# Patient Record
Sex: FEMALE | Race: BLACK OR AFRICAN AMERICAN | Employment: OTHER | URBAN - METROPOLITAN AREA
[De-identification: names, ages, dates, MRNs, and addresses within clinical notes are randomized per-mention and may not be internally consistent; named-entity substitution may affect disease eponyms.]

---

## 2017-01-10 RX ORDER — SYRINGE-NEEDLE,INSULIN,0.5 ML 30 G X1/2"
SYRINGE, EMPTY DISPOSABLE MISCELLANEOUS
Qty: 100 SYRINGE | Refills: 11 | Status: SHIPPED | OUTPATIENT
Start: 2017-01-10 | End: 2017-07-30

## 2017-02-02 ENCOUNTER — OFFICE VISIT (OUTPATIENT)
Dept: INTERNAL MEDICINE CLINIC | Age: 77
End: 2017-02-02

## 2017-02-02 DIAGNOSIS — E11.9 TYPE 2 DIABETES MELLITUS WITHOUT COMPLICATION, WITH LONG-TERM CURRENT USE OF INSULIN (HCC): Primary | ICD-10-CM

## 2017-02-02 DIAGNOSIS — Z79.4 TYPE 2 DIABETES MELLITUS WITHOUT COMPLICATION, WITH LONG-TERM CURRENT USE OF INSULIN (HCC): Primary | ICD-10-CM

## 2017-02-02 DIAGNOSIS — E03.2 HYPOTHYROIDISM DUE TO MEDICATION: ICD-10-CM

## 2017-02-02 DIAGNOSIS — I10 ESSENTIAL HYPERTENSION: ICD-10-CM

## 2017-02-02 DIAGNOSIS — E78.5 DYSLIPIDEMIA: ICD-10-CM

## 2017-02-02 NOTE — MR AVS SNAPSHOT
Visit Information Date & Time Provider Department Dept. Phone Encounter #  
 2/2/2017  2:45 PM Jameson Delatorre MD [facility] [employer] [facility] [address] [id number] Upcoming Health Maintenance Date Due  
 FOOT EXAM Q1 5/8/2016 HEMOGLOBIN A1C Q6M 9/22/2016 EYE EXAM RETINAL OR DILATED Q1 3/31/2017* GLAUCOMA SCREENING Q2Y 5/8/2017 MICROALBUMIN Q1 7/14/2017 LIPID PANEL Q1 7/14/2017 MEDICARE YEARLY EXAM 7/15/2017 Pneumococcal 65+ Low/Medium Risk (2 of 2 - PPSV23) 11/11/2017 DTaP/Tdap/Td series (2 - Td) 2/2/2027 *Topic was postponed. The date shown is not the original due date. Allergies as of 2/2/2017  Review Complete On: 2/2/2017 By: Audelia Iyer No Known Allergies Current Immunizations  Reviewed on 11/15/2012 Name Date Influenza Vaccine Split  Deferred (Patient Refused) Pneumococcal Vaccine (Unspecified Type) 11/11/2012 12:57 PM  
  
 Not reviewed this visit You Were Diagnosed With   
  
 Codes Comments Type 2 diabetes mellitus without complication, with long-term current use of insulin (HCC)    -  Primary ICD-10-CM: E11.9, Z79.4 ICD-9-CM: 250.00, V58.67 Dyslipidemia     ICD-10-CM: E78.5 ICD-9-CM: 272.4 Essential hypertension     ICD-10-CM: I10 
ICD-9-CM: 401.9 Hypothyroidism due to medication     ICD-10-CM: E03.2 ICD-9-CM: 244.8, E980.5 Vitals BP Pulse Temp Height(growth percentile) Weight(growth percentile) SpO2  
 149/69 (BP 1 Location: Left arm, BP Patient Position: Sitting) 72 98 °F (36.7 °C) (Oral) 5' 3\" (1.6 m) 177 lb 4.8 oz (80.4 kg) 98% BMI OB Status Smoking Status 31.41 kg/m2 Hysterectomy Never Smoker BMI and BSA Data Body Mass Index Body Surface Area  
 31.41 kg/m 2 1.89 m 2 Preferred Pharmacy Pharmacy Name Phone [id number] Lizette Stanley, [address] [place] [address] [phone] Your Updated Medication List  
  
   
 This list is accurate as of: 2/2/17  4:25 PM.  Always use your most recent med list. amLODIPine 10 mg tablet Commonly known as:  Blanka Croon TAKE 1 TABLET BY MOUTH EVERY MORNING  
  
 brimonidine 0.15 % ophthalmic solution Commonly known as:  ALPHAGAN  
  
 clopidogrel 75 mg Tab Commonly known as:  PLAVIX TAKE 1 TABLET BY MOUTH EVERY DAY  
  
 fenofibrate nanocrystallized 145 mg tablet Commonly known as:  TRICOR  
TAKE 1 TABLET BY MOUTH EVERY DAY  
  
 insulin aspart protamine/insulin aspart 100 unit/mL (70-30) injection Commonly known as:  NovoLOG Mix 70-30  
30 units ac b Insulin Syringe-Needle U-100 1/2 mL 30 gauge x 1/2\" Syrg Commonly known as:  BD INSULIN SYRINGE ULTRA-FINE Use as directed daily. E11.9  
  
 latanoprost 0.005 % ophthalmic solution Commonly known as:  Stanislav Cameron Administer 1 Drop to both eyes nightly. levothyroxine 150 mcg tablet Commonly known as:  SYNTHROID  
TAKE 1 TABLET BY MOUTH EVERY DAY  
  
 losartan-hydroCHLOROthiazide 100-25 mg per tablet Commonly known as:  HYZAAR  
TAKE 1 TABLET ONCE A DAY ORALLY Introducing [facility]! Marly Tabares introduces [employer] patient portal. Now you can access parts of your medical record, email your doctor's office, and request medication refills online. 1. In your internet browser, go to [email or web address] 2. Click on the First Time User? Click Here link in the Sign In box. You will see the New Member Sign Up page. 3. Enter your [employer] Access Code exactly as it appears below. You will not need to use this code after youve completed the sign-up process. If you do not sign up before the expiration date, you must request a new code. · [employer] Access Code: [email or web address] Expires: 5/3/2017  4:25 PM 
 
4. Enter the last four digits of your Social Security Number (xxxx) and Date of Birth (mm/dd/yyyy) as indicated and click Submit.  You will be taken to the next sign-up page. 5. Create a [employer] ID. This will be your [employer] login ID and cannot be changed, so think of one that is secure and easy to remember. 6. Create a [employer] password. You can change your password at any time. 7. Enter your Password Reset Question and Answer. This can be used at a later time if you forget your password. 8. Enter your e-mail address. You will receive e-mail notification when new information is available in [address] 9. Click Sign Up. You can now view and download portions of your medical record. 10. Click the Download Summary menu link to download a portable copy of your medical information. If you have questions, please visit the Frequently Asked Questions section of the [employer] website. Remember, [employer] is NOT to be used for urgent needs. For medical emergencies, dial 911. Now available from your iPhone and Android! Please provide this summary of care documentation to your next provider. Your primary care clinician is listed as William Doll. If you have any questions after today's visit, please call [phone].

## 2017-02-02 NOTE — PROGRESS NOTES
[address] [facility]  [facility]  [address] [place] [address]  Phone:  [phone]  Fax: [phone]       Chief Complaint   Patient presents with    Diabetes     6 week follow up    . SUBJECTIVE:    Philippe Lyons is a 77 y.o. female comes in for return visit stating that she has done fairly well. She has not had any symptomatic hypoglycemia. She is currently taking premixed insulin 30 units AC breakfast and occasionally 4 units AC dinner. Her control is not optimal.  I have discussed with her different treatment regimens which would minimize hypoglycemia and allow more intense diabetic control. She has been off this on several occasions but continues to decline. She has been taking her antihypertensive medication as prescribed as is the case with her statin in view of her increased cardiovascular risk. To date she has not had any cardiovascular event. She remains on her thyroid supplement also. She continues to lead a sedentary existence. Current Outpatient Prescriptions   Medication Sig Dispense Refill    Insulin Syringe-Needle U-100 (BD INSULIN SYRINGE ULTRA-FINE) 1/2 mL 30 gauge x 1/2\" syrg Use as directed daily. E11.9 100 Syringe 11    fenofibrate nanocrystallized (TRICOR) 145 mg tablet TAKE 1 TABLET BY MOUTH EVERY DAY 30 Tab 11    losartan-hydrochlorothiazide (HYZAAR) 100-25 mg per tablet TAKE 1 TABLET ONCE A DAY ORALLY 30 Tab 11    clopidogrel (PLAVIX) 75 mg tablet TAKE 1 TABLET BY MOUTH EVERY DAY 30 Tab 11    levothyroxine (SYNTHROID) 150 mcg tablet TAKE 1 TABLET BY MOUTH EVERY DAY 30 Tab 11    amLODIPine (NORVASC) 10 mg tablet TAKE 1 TABLET BY MOUTH EVERY MORNING 30 Tab 11    insulin aspart protamine/insulin aspart (NOVOLOG MIX 70-30) 100 unit/mL (70-30) injection 30 units ac b 20 mL 9    brimonidine (ALPHAGAN) 0.15 % ophthalmic solution       latanoprost (XALATAN) 0.005 % ophthalmic solution Administer 1 Drop to both eyes nightly.          Past Medical History   Diagnosis Date    [facility]     [facility]      unknown to family    Hypercholesteremia     Hypertension     Stroke ([facility]     Thyroid disease      Past Surgical History   Procedure Laterality Date    Hx gyn      Hx hysterectomy      Pr thyroidectomy      Pr removal gallbladder      Hx heent       thyroidectomy     No Known Allergies      REVIEW OF SYSTEMS:  General: negative for - chills or fever  ENT: negative for - headaches, nasal congestion or tinnitus  Respiratory: negative for - cough, hemoptysis, shortness of breath or wheezing  Cardiovascular : negative for - chest pain, edema, palpitations or shortness of breath  Gastrointestinal: negative for - abdominal pain, blood in stools, heartburn or nausea/vomiting  Genito-Urinary: no dysuria, trouble voiding, or hematuria  Musculoskeletal: negative for - gait disturbance, joint pain, joint stiffness or joint swelling  Neurological: no TIA or stroke symptoms  Hematologic: no bruises, no bleeding, no swollen glands  Integument: no lumps, mole changes, nail changes or rash  Endocrine: no malaise/lethargy or unexpected weight changes      Social History     Social History    Marital status:      Spouse name: N/A    Number of children: 1    Years of education: N/A     Occupational History    retired      Social History Main Topics    Smoking status: Never Smoker    Smokeless tobacco: Never Used    Alcohol use Yes      Comment: been years    Drug use: No    Sexual activity: Not Currently     Other Topics Concern    None     Social History Narrative     Family History   Problem Relation Age of Onset    Diabetes Father     No Known Problems Mother        OBJECTIVE:    Visit Vitals    /69 (BP 1 Location: Left arm, BP Patient Position: Sitting)  Comment: repeat 130/60    Pulse 72    Temp 98 °F (36.7 °C) (Oral)    Ht 5' 3\" (1.6 m)    Wt 177 lb 4.8 oz (80.4 kg)    SpO2 98%    BMI 31.41 kg/m2     CONSTITUTIONAL: well , well nourished, appears age appropriate  EYES: perrla, eom intact  ENMT:moist mucous membranes, pharynx clear  NECK: supple. Thyroid normal  RESPIRATORY: Chest: clear to ascultation and percussion   CARDIOVASCULAR: Heart: regular rate and rhythm  GASTROINTESTINAL: Abdomen: soft, bowel sounds active  HEMATOLOGIC: no pathological lymph nodes palpated  MUSCULOSKELETAL: Extremities: no edema, pulse 1+   INTEGUMENT: No unusual rashes or suspicious skin lesions noted. Nails appear normal.  NEUROLOGIC: non-focal exam   MENTAL STATUS: alert and oriented, appropriate affect      ASSESSMENT:  1. Type 2 diabetes mellitus without complication, with long-term current use of insulin ([address]    2. Dyslipidemia    3. Essential hypertension    4. Hypothyroidism due to medication        PLAN:    1. I again discuss the need to change her diabetic regimen and she refuses. She is actually on a regimen that has a high potential to develop hypoglycemia. Given the fact that she wants to remain on this I strongly suggest to her as I have done repetitively that she has to eat at the same time every day. The majority if not all of her reactions have occurred when she either missed a meal or did not eat on time. The current regimen is very unforgiving as I have told her many times before. 2. She will continue her statin as prescribed and efficacy and compliance will be assessed. 3. Her blood pressure is excellent today and no adjustments are made. 4. She will continue her thyroid supplement. Her last TSH was entirely normal.    5. I strongly advise her to increase her physical activity. She leads an extremely sedentary lifestyle which is not optimal for her diabetes. .  Orders Placed This Encounter    HEMOGLOBIN A1C WITH EAG    METABOLIC PANEL, BASIC    APOLIPOPROTEIN B         Follow-up Disposition:  Return in about 3 months (around 5/2/2017).       Karishma Hagen MD

## 2017-02-02 NOTE — PROGRESS NOTES
Chief Complaint   Patient presents with    Diabetes     6 week follow up      1. Have you been to the ER, urgent care clinic since your last visit? Hospitalized since your last visit? No    2. Have you seen or consulted any other health care providers outside of the [address] [place] [address] since your last visit? Include any pap smears or colon screening.  No

## 2017-02-05 VITALS
OXYGEN SATURATION: 98 % | BODY MASS INDEX: 31.41 KG/M2 | DIASTOLIC BLOOD PRESSURE: 69 MMHG | WEIGHT: 177.3 LBS | TEMPERATURE: 98 F | HEIGHT: 63 IN | HEART RATE: 72 BPM | SYSTOLIC BLOOD PRESSURE: 149 MMHG

## 2017-02-06 LAB
APO B SERPL-MCNC: 69 MG/DL (ref 54–133)
BUN SERPL-MCNC: 20 MG/DL (ref 8–27)
BUN/CREAT SERPL: 23 (ref 11–26)
CALCIUM SERPL-MCNC: 9.4 MG/DL (ref 8.7–10.3)
CHLORIDE SERPL-SCNC: 99 MMOL/L (ref 96–106)
CO2 SERPL-SCNC: 23 MMOL/L (ref 18–29)
CREAT SERPL-MCNC: 0.87 MG/DL (ref 0.57–1)
EST. AVERAGE GLUCOSE BLD GHB EST-MCNC: 203 MG/DL
GLUCOSE SERPL-MCNC: 44 MG/DL (ref 65–99)
HBA1C MFR BLD: 8.7 % (ref 4.8–5.6)
POTASSIUM SERPL-SCNC: 4.2 MMOL/L (ref 3.5–5.2)
SODIUM SERPL-SCNC: 141 MMOL/L (ref 134–144)

## 2017-02-20 ENCOUNTER — HOSPITAL ENCOUNTER (EMERGENCY)
Age: 77
Discharge: HOME | End: 2017-02-20
Attending: EMERGENCY MEDICINE
Payer: MEDICARE

## 2017-02-20 VITALS
BODY MASS INDEX: 30.12 KG/M2 | TEMPERATURE: 97.8 F | WEIGHT: 170 LBS | RESPIRATION RATE: 17 BRPM | HEIGHT: 63 IN | SYSTOLIC BLOOD PRESSURE: 154 MMHG | HEART RATE: 83 BPM | DIASTOLIC BLOOD PRESSURE: 63 MMHG | OXYGEN SATURATION: 97 %

## 2017-02-20 DIAGNOSIS — E16.2 HYPOGLYCEMIA: Primary | ICD-10-CM

## 2017-02-20 LAB
ALBUMIN SERPL BCP-MCNC: 3.6 G/DL (ref 3.5–5)
ALBUMIN/GLOB SERPL: 1.2 {RATIO} (ref 1.1–2.2)
ALP SERPL-CCNC: 62 U/L (ref 45–117)
ALT SERPL-CCNC: 25 U/L (ref 12–78)
ANION GAP BLD CALC-SCNC: 13 MMOL/L (ref 5–15)
AST SERPL W P-5'-P-CCNC: 16 U/L (ref 15–37)
BILIRUB SERPL-MCNC: 0.5 MG/DL (ref 0.2–1)
BUN SERPL-MCNC: 24 MG/DL (ref 6–20)
BUN/CREAT SERPL: 30 (ref 12–20)
CALCIUM SERPL-MCNC: 8.5 MG/DL (ref 8.5–10.1)
CHLORIDE SERPL-SCNC: 98 MMOL/L (ref 97–108)
CO2 SERPL-SCNC: 27 MMOL/L (ref 21–32)
CREAT SERPL-MCNC: 0.81 MG/DL (ref 0.55–1.02)
GLOBULIN SER CALC-MCNC: 3.1 G/DL (ref 2–4)
GLUCOSE BLD STRIP.AUTO-MCNC: 169 MG/DL (ref 65–100)
GLUCOSE BLD STRIP.AUTO-MCNC: 180 MG/DL (ref 65–100)
GLUCOSE BLD STRIP.AUTO-MCNC: 222 MG/DL (ref 65–100)
GLUCOSE BLD STRIP.AUTO-MCNC: 451 MG/DL (ref 65–100)
GLUCOSE SERPL-MCNC: 172 MG/DL (ref 65–100)
POTASSIUM SERPL-SCNC: 3.3 MMOL/L (ref 3.5–5.1)
PROT SERPL-MCNC: 6.7 G/DL (ref 6.4–8.2)
SERVICE CMNT-IMP: ABNORMAL
SODIUM SERPL-SCNC: 138 MMOL/L (ref 136–145)

## 2017-02-20 PROCEDURE — 82962 GLUCOSE BLOOD TEST: CPT

## 2017-02-20 PROCEDURE — 99284 EMERGENCY DEPT VISIT MOD MDM: CPT

## 2017-02-20 PROCEDURE — 80053 COMPREHEN METABOLIC PANEL: CPT | Performed by: STUDENT IN AN ORGANIZED HEALTH CARE EDUCATION/TRAINING PROGRAM

## 2017-02-20 PROCEDURE — 36415 COLL VENOUS BLD VENIPUNCTURE: CPT | Performed by: STUDENT IN AN ORGANIZED HEALTH CARE EDUCATION/TRAINING PROGRAM

## 2017-02-20 PROCEDURE — 74011636637 HC RX REV CODE- 636/637: Performed by: STUDENT IN AN ORGANIZED HEALTH CARE EDUCATION/TRAINING PROGRAM

## 2017-02-20 RX ORDER — INSULIN ASPART 100 [IU]/ML
15 INJECTION, SUSPENSION SUBCUTANEOUS
Qty: 10 ML | Refills: 0 | Status: ON HOLD | OUTPATIENT
Start: 2017-02-20 | End: 2017-06-06

## 2017-02-20 RX ORDER — INSULIN ASPART 100 [IU]/ML
INJECTION, SUSPENSION SUBCUTANEOUS
Qty: 20 ML | Refills: 9 | Status: SHIPPED | OUTPATIENT
Start: 2017-02-20 | End: 2017-02-20

## 2017-02-20 RX ADMIN — INSULIN LISPRO 15 UNITS: 100 INJECTION, SUSPENSION SUBCUTANEOUS at 09:02

## 2017-02-20 NOTE — ED NOTES
Discharge instructions reviewed with patient, copy given by Dr. Britany Hood . Pt escorted to exit area via wheelchair.

## 2017-02-20 NOTE — ED PROVIDER NOTES
Patient is a 77 y.o. female presenting with hypoglycemia. The history is provided by the patient. Low Blood Sugar    This is a new problem. The current episode started 1 to 2 hours ago. The problem has not changed since onset. Associated symptoms include confusion. Mental status baseline is normal.  Her past medical history is significant for diabetes. 75y/o F with hx of DM presents with confusion and hypoglycemia. Pt's son found her this am confused and checked her BG with was 39. She was given 4 tabs of oral glucose by EMS which kyle her sugar to 64. On arrival, patient was at baseline mental status. She reports taking her insulin 70/30 at 9am yesterday. Patient has not been eating full meals. Her pcp has been trying to change her insulin regimen, but patient keeps refusing. Denies cp, sob, N/V, abd pain. Below was taken from the most recent pcp visit:  \"She is currently taking premixed insulin 30 units AC breakfast and occasionally 4 units AC dinner. Her control is not optimal. I have discussed with her different treatment regimens which would minimize hypoglycemia and allow more intense diabetic control. She has been off this on several occasions but continues to decline.  \"    Past Medical History:   Diagnosis Date    Diabetes ([address]     Heart failure ([address]      unknown to family    Hypercholesteremia     Hypertension     Stroke ([address]     Thyroid disease        Past Surgical History:   Procedure Laterality Date    Hx gyn      Hx hysterectomy      Pr thyroidectomy      Pr removal gallbladder      Hx heent       thyroidectomy         Family History:   Problem Relation Age of Onset    Diabetes Father     No Known Problems Mother        Social History     Social History    Marital status:      Spouse name: N/A    Number of children: 1    Years of education: N/A     Occupational History    retired      Social History Main Topics    Smoking status: Never Smoker    Smokeless tobacco: Never Used    Alcohol use Yes      Comment: been years    Drug use: No    Sexual activity: Not Currently     Other Topics Concern    Not on file     Social History Narrative         ALLERGIES: Review of patient's allergies indicates no known allergies. Review of Systems   Constitutional: Negative for activity change, chills and fever. HENT: Negative for congestion and sore throat. Eyes: Negative for pain and redness. Respiratory: Negative for cough, chest tightness and shortness of breath. Cardiovascular: Negative for chest pain and palpitations. Gastrointestinal: Negative for abdominal pain, diarrhea, nausea and vomiting. Genitourinary: Negative for dysuria, frequency and urgency. Musculoskeletal: Negative for back pain and neck pain. Skin: Negative for rash. Neurological: Negative for syncope, light-headedness and headaches. Psychiatric/Behavioral: Positive for confusion. All other systems reviewed and are negative. Vitals:    02/20/17 0709   BP: 175/72   Pulse: 83   Resp: 17   Temp: 97.8 °F (36.6 °C)   SpO2: 99%   Weight: 77.1 kg (170 lb)   Height: 5' 3\" (1.6 m)            Physical Exam   Constitutional: She is oriented to person, place, and time. She appears well-developed and well-nourished. No distress. HENT:   Head: Normocephalic. Nose: Nose normal.   Mouth/Throat: Oropharynx is clear and moist. No oropharyngeal exudate. Eyes: Conjunctivae are normal. Pupils are equal, round, and reactive to light. No scleral icterus. Neck: Normal range of motion. Neck supple. No JVD present. No tracheal deviation present. No thyromegaly present. Cardiovascular: Normal rate, regular rhythm and intact distal pulses. Exam reveals no gallop and no friction rub. No murmur heard. Pulmonary/Chest: Effort normal and breath sounds normal. No stridor. No respiratory distress. She has no wheezes. She has no rales. Abdominal: Soft.  Bowel sounds are normal. She exhibits no distension. There is no tenderness. There is no rebound and no guarding. Musculoskeletal: Normal range of motion. She exhibits no edema. Lymphadenopathy:     She has no cervical adenopathy. Neurological: She is alert and oriented to person, place, and time. No cranial nerve deficit. She exhibits normal muscle tone. Coordination normal.   Skin: Skin is warm and dry. No rash noted. She is not diaphoretic. No erythema. Psychiatric: She has a normal mood and affect. Her behavior is normal.   Nursing note and vitals reviewed. MDM  Number of Diagnoses or Management Options  Diagnosis management comments: Patient presents with hypoglycemia after taking her long acting insulin yesterday with subsequent poor PO intake. BG has now improved to 180 s/p 4 tabs of glucose. Pt is now at baseline mental status. Ddx: Hypoglycemia from insulin use in setting of poor po intake    --Will check cmp and rpt poc glucose in 1hr. Amount and/or Complexity of Data Reviewed  Clinical lab tests: ordered and reviewed  Tests in the radiology section of CPT®: reviewed  Tests in the medicine section of CPT®: ordered and reviewed      ED Course   Re-evaluation: 0840  BG now elevated to 455 without intervention. Will treat with half dose of home insulin (15U) and discharge home. Pt advised to eat full meals with snacks in between. She was also advised to cut her home insulin dose in half to 15U. Patient advised to followup with her pcp within 1-2 days.     Procedures    LABORATORY TESTS:  Recent Results (from the past 12 hour(s))   GLUCOSE, POC    Collection Time: 02/20/17  7:04 AM   Result Value Ref Range    Glucose (POC) 180 (H) 65 - 100 mg/dL    Performed by Ravindra Cooper ([facility])    GLUCOSE, POC    Collection Time: 02/20/17  7:53 AM   Result Value Ref Range    Glucose (POC) 222 (H) 65 - 100 mg/dL    Performed by Mohsen Dodson, COMPREHENSIVE    Collection Time: 02/20/17  7:54 AM   Result Value Ref Range Sodium 138 136 - 145 mmol/L    Potassium 3.3 (L) 3.5 - 5.1 mmol/L    Chloride 98 97 - 108 mmol/L    CO2 27 21 - 32 mmol/L    Anion gap 13 5 - 15 mmol/L    Glucose 172 (H) 65 - 100 mg/dL    BUN 24 (H) 6 - 20 MG/DL    Creatinine 0.81 0.55 - 1.02 MG/DL    BUN/Creatinine ratio 30 (H) 12 - 20      GFR est AA >60 >60 ml/min/1.73m2    GFR est non-AA >60 >60 ml/min/1.73m2    Calcium 8.5 8.5 - 10.1 MG/DL    Bilirubin, total 0.5 0.2 - 1.0 MG/DL    ALT (SGPT) 25 12 - 78 U/L    AST (SGOT) 16 15 - 37 U/L    Alk. phosphatase 62 45 - 117 U/L    Protein, total 6.7 6.4 - 8.2 g/dL    Albumin 3.6 3.5 - 5.0 g/dL    Globulin 3.1 2.0 - 4.0 g/dL    A-G Ratio 1.2 1.1 - 2.2     GLUCOSE, POC    Collection Time: 02/20/17  8:25 AM   Result Value Ref Range    Glucose (POC) 451 (H) 65 - 100 mg/dL    Performed by Mercedes Amador, POC    Collection Time: 02/20/17  8:31 AM   Result Value Ref Range    Glucose (POC) 169 (H) 65 - 100 mg/dL    Performed by Martínez Titus         IMAGING RESULTS:  No orders to display       MEDICATIONS GIVEN:  Medications   insulin lispro protamine/insulin lispro (HUMALOG MIX 75/25) injection 15 Units (not administered)       IMPRESSION:  1. Hypoglycemia        PLAN:  1. Take half the dose of your home insulin. Followup with your primary care doctor. Current Discharge Medication List      CONTINUE these medications which have CHANGED    Details   !! insulin aspart protamine/insulin aspart (NOVOLOG MIX 70-30) 100 unit/mL (70-30) injection 30 units ac b  Qty: 20 mL, Refills: 9      !! insulin aspart protamine/insulin aspart (NOVOLOG MIX 70-30) 100 unit/mL (70-30) injection 15 Units by SubCUTAneous route Daily (before breakfast). Qty: 10 mL, Refills: 0       !! - Potential duplicate medications found. Please discuss with provider.       CONTINUE these medications which have NOT CHANGED    Details   Insulin Syringe-Needle U-100 (BD INSULIN SYRINGE ULTRA-FINE) 1/2 mL 30 gauge x 1/2\" syrg Use as directed daily.E11.9  Qty: 100 Syringe, Refills: 11      fenofibrate nanocrystallized (TRICOR) 145 mg tablet TAKE 1 TABLET BY MOUTH EVERY DAY  Qty: 30 Tab, Refills: 11      losartan-hydrochlorothiazide (HYZAAR) 100-25 mg per tablet TAKE 1 TABLET ONCE A DAY ORALLY  Qty: 30 Tab, Refills: 11      clopidogrel (PLAVIX) 75 mg tablet TAKE 1 TABLET BY MOUTH EVERY DAY  Qty: 30 Tab, Refills: 11      levothyroxine (SYNTHROID) 150 mcg tablet TAKE 1 TABLET BY MOUTH EVERY DAY  Qty: 30 Tab, Refills: 11      amLODIPine (NORVASC) 10 mg tablet TAKE 1 TABLET BY MOUTH EVERY MORNING  Qty: 30 Tab, Refills: 11      brimonidine (ALPHAGAN) 0.15 % ophthalmic solution       latanoprost (XALATAN) 0.005 % ophthalmic solution Administer 1 Drop to both eyes nightly. 2.   Follow-up Information     Follow up With Details Comments [address] [place] Schedule an appointment as soon as possible for a visit in 1 day for blood glucose monitoring [id number] [place], [place] [address]suze Leach [facility] [phone]          Return to ED if worse       DISCHARGE NOTE  8:46 AM  The patient has been re-evaluated and is ready for discharge. Reviewed available results with patient. Counseled patient on diagnosis and care plan. Patient has expressed understanding, and all questions have been answered. Patient agrees with plan and agrees to follow up as recommended, or return to the ED if their symptoms worsen. Discharge instructions have been provided and explained to the patient, along with reasons to return to the ED.

## 2017-02-20 NOTE — ED NOTES
Bedside and Verbal shift change report given to Christina Espana RN  (oncoming nurse) by Key Roberson RN (offgoing nurse). Report included the following information SBAR, Kardex, ED Summary, Intake/Output, MAR, Recent Results and Med Rec Status.

## 2017-02-20 NOTE — ED TRIAGE NOTES
Per ems; known pt found with bgl of 39. Gave 4 tubes of oral gulose. Repeat bgl 64. Pt denies all complaints.

## 2017-02-20 NOTE — DISCHARGE INSTRUCTIONS
Hypoglycemia: Care Instructions  Your Care Instructions  Hypoglycemia means that your blood sugar is low and your body is not getting enough fuel. Some people get low blood sugar from not eating often enough. Some medicines to treat diabetes can cause low blood sugar. People who have had surgery on their stomachs or intestines may get hypoglycemia. Problems with the pancreas, kidneys, or liver also can cause low blood sugar. A snack or drink with sugar in it will raise your blood sugar and should ease your symptoms right away. Your doctor may recommend that you change or stop your medicines until you can get your blood sugar levels under control. In the long run, you may need to change your diet and eating habits so that you get enough fuel for your body throughout the day. Follow-up care is a key part of your treatment and safety. Be sure to make and go to all appointments, and call your doctor if you are having problems. It's also a good idea to know your test results and keep a list of the medicines you take. How can you care for yourself at home? · Learn to recognize the early signs of low blood sugar. Signs include:  ¨ Nausea. ¨ Hunger. ¨ Feeling nervous, irritable, or shaky. ¨ Cold, clammy, wet skin. ¨ Sweating (when you are not exercising). ¨ A fast heartbeat. ¨ Numbness or tingling of the fingertips or lips. · If you feel an episode of low blood sugar coming on, drink fruit juice or sugared (not diet) soda, or eat sugar in the form of candy, cubes, or tablets. Hover 3D are another [employer] W[employer]. · Eat small, frequent meals so that you do not get too hungry between meals. · Balance extra exercise with eating more. · Keep a written record of your low blood sugar episodes, including when you last ate and what you ate, so that you can learn what causes your blood sugar to drop.   · Make sure your family, friends, and coworkers know the symptoms of low blood sugar and know what to do to get your sugar level up. · Wear medical alert jewelry that lists your condition. You can buy this at most drugstores. When should you call for help? Call 911 anytime you think you may need emergency care. For example, call if:  · You have a seizure. · You passed out (lost consciousness), or you suddenly become very sleepy or confused. (You may have very low blood sugar.)  Call your doctor now or seek immediate medical care if:  · You have symptoms of low blood sugar, such as:  ¨ Sweating. ¨ Feeling nervous, shaky, and weak. ¨ Extreme hunger and slight nausea. ¨ Dizziness and headache. ¨ Blurred vision. ¨ Confusion. Watch closely for changes in your health, and be sure to contact your doctor if:  · Your symptoms continue or return. Where can you learn more? Go to http://toi-sunil.info/. Enter C959 in the search box to learn more about \"Hypoglycemia: Care Instructions. \"  Current as of: May 23, 2016  Content Version: 11.1  © 0406-7085 [employer]. Care instructions adapted under license by [employer] (which disclaims liability or warranty for this information). If you have questions about a medical condition or this instruction, always ask your healthcare professional. [address] any warranty or liability for your use of this information.

## 2017-02-20 NOTE — ED NOTES
0800 pt   0820 pt states she is concerned that BS low again and would like us to recheck BS.   0825 Pt's 451, pt states she spilled something \"sticky\" on her earlier this morning. 0830 Pt's BS rechecked and is 169. Pt provided with meal tray.

## 2017-02-21 ENCOUNTER — OFFICE VISIT (OUTPATIENT)
Dept: INTERNAL MEDICINE CLINIC | Age: 77
End: 2017-02-21

## 2017-02-21 VITALS
HEIGHT: 63 IN | SYSTOLIC BLOOD PRESSURE: 150 MMHG | DIASTOLIC BLOOD PRESSURE: 66 MMHG | HEART RATE: 82 BPM | BODY MASS INDEX: 30.48 KG/M2 | RESPIRATION RATE: 14 BRPM | WEIGHT: 172 LBS | TEMPERATURE: 98.8 F | OXYGEN SATURATION: 98 %

## 2017-02-21 DIAGNOSIS — E11.9 TYPE 2 DIABETES MELLITUS WITHOUT COMPLICATION, WITH LONG-TERM CURRENT USE OF INSULIN (HCC): Primary | ICD-10-CM

## 2017-02-21 DIAGNOSIS — I10 ESSENTIAL HYPERTENSION: ICD-10-CM

## 2017-02-21 DIAGNOSIS — Z79.4 TYPE 2 DIABETES MELLITUS WITHOUT COMPLICATION, WITH LONG-TERM CURRENT USE OF INSULIN (HCC): Primary | ICD-10-CM

## 2017-02-21 NOTE — PROGRESS NOTES
Chief Complaint   Patient presents with    Follow-up     ER follow up   . SUBECTIVE:    Good Mobley is a 77 y.o. female comes in for return visit stating that she has not done well. She had interventional hypoglycemia. Interestingly she checked her blood sugar post dinner and it was 400. She then took 4 units of insulin and nothing else to eat or drink the remainder of the night. Early the following morning she developed symptomatic hypoglycemia. The patient has a past history of hypertension, dyslipidemia, and hypothyroidism. Current Outpatient Prescriptions   Medication Sig Dispense Refill    insulin aspart protamine/insulin aspart (NOVOLOG MIX 70-30) 100 unit/mL (70-30) injection 15 Units by SubCUTAneous route Daily (before breakfast). 10 mL 0    Insulin Syringe-Needle U-100 (BD INSULIN SYRINGE ULTRA-FINE) 1/2 mL 30 gauge x 1/2\" syrg Use as directed daily. E11.9 100 Syringe 11    fenofibrate nanocrystallized (TRICOR) 145 mg tablet TAKE 1 TABLET BY MOUTH EVERY DAY 30 Tab 11    losartan-hydrochlorothiazide (HYZAAR) 100-25 mg per tablet TAKE 1 TABLET ONCE A DAY ORALLY 30 Tab 11    clopidogrel (PLAVIX) 75 mg tablet TAKE 1 TABLET BY MOUTH EVERY DAY 30 Tab 11    levothyroxine (SYNTHROID) 150 mcg tablet TAKE 1 TABLET BY MOUTH EVERY DAY 30 Tab 11    amLODIPine (NORVASC) 10 mg tablet TAKE 1 TABLET BY MOUTH EVERY MORNING 30 Tab 11    brimonidine (ALPHAGAN) 0.15 % ophthalmic solution       latanoprost (XALATAN) 0.005 % ophthalmic solution Administer 1 Drop to both eyes nightly.          Past Medical History   Diagnosis Date    Diabetes ([address]     Heart failure ([address]      unknown to family    Hypercholesteremia     Hypertension     Stroke ([address]     Thyroid disease      Past Surgical History   Procedure Laterality Date    Hx gyn      Hx hysterectomy      Pr thyroidectomy      Pr removal gallbladder      Hx heent       thyroidectomy     No Known Allergies    REVIEW OF SYSTEMS:  Review of Systems - Negative except   ENT ROS: negative for - headaches, hearing change, nasal congestion, oral lesions, tinnitus, visual changes or   Respiratory ROS: no cough, shortness of breath, or wheezing  Cardiovascular ROS: no chest pain or dyspnea on exertion  Gastrointestinal ROS: no abdominal pain, change in bowel habits, or black or blood  Genito-Urinary ROS: no dysuria, trouble voiding, or hematuria  Musculoskeletal ROS: negative  Neurological ROS: no TIA or stroke symptoms      Social History     Social History    Marital status:      Spouse name: N/A    Number of children: 1    Years of education: N/A     Occupational History    retired      Social History Main Topics    Smoking status: Never Smoker    Smokeless tobacco: Never Used    Alcohol use Yes      Comment: been years    Drug use: No    Sexual activity: Not Currently     Other Topics Concern    None     Social History Narrative   r  Family History   Problem Relation Age of Onset    Diabetes Father     No Known Problems Mother        OBJECTIVE:  Visit Vitals    /66 (BP 1 Location: Right arm, BP Patient Position: Sitting)    Pulse 82    Temp 98.8 °F (37.1 °C) (Oral)    Resp 14    Ht 5' 3\" (1.6 m)    Wt 172 lb (78 kg)    SpO2 98%    BMI 30.47 kg/m2     ENT: perrla,  eom intact  NECK: supple. Thyroid normal, no JVD  CHEST: clear to ascultation and percussion   HEART: regular rate and rhythm  ABD: soft, bowel sounds active,   EXTREMITIES: no edema, pulse 1+  INTEGUMENT: clear      ASSESSMENT:  1. Type 2 diabetes mellitus without complication, with long-term current use of insulin ([address]    2. Essential hypertension        PLAN:    1. The patient is having frequent reactions related to her premixed insulin. This is a very potent combination as I have mentioned to her before.   She would fair better on a basal insulin with a GLP1 agonist.  This would minimize symptomatic hypoglycemia and probably lead to better control. Her hemoglobin A1C is progressively increasing. I strongly advise her to make the transition but she declines. If she feels that she needs to take additional insulin she is to take less than 4 units during each of the episodes. 2. Blood pressure is excellent today and no adjustments are made. Blood pressure is 140/70.    3. She will keep her old appointment. Follow-up Disposition:  Return keep old apt.       Tere Howell MD

## 2017-02-21 NOTE — PROGRESS NOTES
1. Have you been to the ER, urgent care clinic since your last visit? Hospitalized since your last visit? Yes Where: [facility]    2. Have you seen or consulted any other health care providers outside of the [address] [place] [address] since your last visit? Include any pap smears or colon screening.  Yes Reason for visit: Low blood sugar

## 2017-02-21 NOTE — MR AVS SNAPSHOT
Visit Information Date & Time Provider Department Dept. Phone Encounter #  
 2/21/2017  4:45 PM Arielle Leon MD [facility] [phone] [id number] Your Appointments 5/4/2017  2:45 PM  
Any with Arielle Leon MD  
[address] [place] [address] and Primary Care [facility]) Appt Note: 3 month f/u  
 [address] [place]  
  
   
 [phone] Upcoming Health Maintenance Date Due  
 FOOT EXAM Q1 5/8/2016 EYE EXAM RETINAL OR DILATED Q1 3/31/2017* GLAUCOMA SCREENING Q2Y 5/8/2017 MICROALBUMIN Q1 7/14/2017 LIPID PANEL Q1 7/14/2017 MEDICARE YEARLY EXAM 7/15/2017 HEMOGLOBIN A1C Q6M 8/3/2017 Pneumococcal 65+ Low/Medium Risk (2 of 2 - PPSV23) 11/11/2017 DTaP/Tdap/Td series (2 - Td) 2/2/2027 *Topic was postponed. The date shown is not the original due date. Allergies as of 2/21/2017  Review Complete On: 2/21/2017 By: [place] No Known Allergies Current Immunizations  Reviewed on 11/15/2012 Name Date Influenza Vaccine Split  Deferred (Patient Refused) Pneumococcal Vaccine (Unspecified Type) 11/11/2012 12:57 PM  
  
 Not reviewed this visit Vitals BP Pulse Temp Resp Height(growth percentile) Weight(growth percentile) 150/66 (BP 1 Location: Right arm, BP Patient Position: Sitting) 82 98.8 °F (37.1 °C) (Oral) 14 5' 3\" (1.6 m) 172 lb (78 kg) SpO2 BMI OB Status Smoking Status 98% 30.47 kg/m2 Hysterectomy Never Smoker BMI and BSA Data Body Mass Index Body Surface Area  
 30.47 kg/m 2 1.86 m 2 Preferred Pharmacy Pharmacy Name Phone [id number] [address] [phone] Your Updated Medication List  
  
   
This list is accurate as of: 2/21/17  5:28 PM.  Always use your most recent med list. amLODIPine 10 mg tablet Commonly known as:  Nish Presser TAKE 1 TABLET BY MOUTH EVERY MORNING  
  
 brimonidine 0.15 % ophthalmic solution Commonly known as:  ALPHAGAN  
  
 clopidogrel 75 mg Tab Commonly known as:  PLAVIX TAKE 1 TABLET BY MOUTH EVERY DAY  
  
 fenofibrate nanocrystallized 145 mg tablet Commonly known as:  TRICOR  
TAKE 1 TABLET BY MOUTH EVERY DAY  
  
 insulin aspart protamine/insulin aspart 100 unit/mL (70-30) injection Commonly known as:  NovoLOG Mix 70-30  
15 Units by SubCUTAneous route Daily (before breakfast). Insulin Syringe-Needle U-100 1/2 mL 30 gauge x 1/2\" Syrg Commonly known as:  BD INSULIN SYRINGE ULTRA-FINE Use as directed daily. E11.9  
  
 latanoprost 0.005 % ophthalmic solution Commonly known as:  Deyanne Pace Administer 1 Drop to both eyes nightly. levothyroxine 150 mcg tablet Commonly known as:  SYNTHROID  
TAKE 1 TABLET BY MOUTH EVERY DAY  
  
 losartan-hydroCHLOROthiazide 100-25 mg per tablet Commonly known as:  HYZAAR  
TAKE 1 TABLET ONCE A DAY ORALLY Introducing \[facility]\"" & HEALTH SERVICES! Finesse Deutsch introduces Userscout patient portal. Now you can access parts of your medical record, email your doctor's office, and request medication refills online. 1. In your internet browser, go to [email or web address] 2. Click on the First Time User? Click Here link in the Sign In box. You will see the New Member Sign Up page. 3. Enter your Userscout Access Code exactly as it appears below. You will not need to use this code after youve completed the sign-up process. If you do not sign up before the expiration date, you must request a new code. · Userscout Access Code: [email or web address] Expires: 5/3/2017  4:25 PM 
 
4. Enter the last four digits of your Social Security Number (xxxx) and Date of Birth (mm/dd/yyyy) as indicated and click Submit. You will be taken to the next sign-up page. 5. Create a Userscout ID.  This will be your Userscout login ID and cannot be changed, so think of one that is secure and easy to remember. 6. Create a Swivl password. You can change your password at any time. 7. Enter your Password Reset Question and Answer. This can be used at a later time if you forget your password. 8. Enter your e-mail address. You will receive e-mail notification when new information is available in [address] 9. Click Sign Up. You can now view and download portions of your medical record. 10. Click the Download Summary menu link to download a portable copy of your medical information. If you have questions, please visit the Frequently Asked Questions section of the Swivl website. Remember, Swivl is NOT to be used for urgent needs. For medical emergencies, dial 911. Now available from your iPhone and Android! Please provide this summary of care documentation to your next provider. Your primary care clinician is listed as William Doll. If you have any questions after today's visit, please call [phone].

## 2017-03-08 RX ORDER — AMLODIPINE BESYLATE 10 MG/1
TABLET ORAL
Qty: 30 TAB | Refills: 11 | Status: SHIPPED | OUTPATIENT
Start: 2017-03-08 | End: 2017-03-09 | Stop reason: SDUPTHER

## 2017-03-09 RX ORDER — AMLODIPINE BESYLATE 10 MG/1
TABLET ORAL
Qty: 30 TAB | Refills: 11 | Status: SHIPPED | OUTPATIENT
Start: 2017-03-09 | End: 2018-01-21 | Stop reason: CLARIF

## 2017-03-21 RX ORDER — CLOPIDOGREL BISULFATE 75 MG/1
75 TABLET ORAL DAILY
Qty: 30 TAB | Refills: 11 | Status: SHIPPED | OUTPATIENT
Start: 2017-03-21 | End: 2017-06-16 | Stop reason: SDUPTHER

## 2017-04-02 RX ORDER — LEVOTHYROXINE SODIUM 150 UG/1
TABLET ORAL
Qty: 30 TAB | Refills: 11 | Status: SHIPPED | OUTPATIENT
Start: 2017-04-02 | End: 2018-04-10 | Stop reason: SDUPTHER

## 2017-05-04 ENCOUNTER — OFFICE VISIT (OUTPATIENT)
Dept: INTERNAL MEDICINE CLINIC | Age: 77
End: 2017-05-04

## 2017-05-04 VITALS
HEART RATE: 68 BPM | DIASTOLIC BLOOD PRESSURE: 72 MMHG | TEMPERATURE: 98.2 F | OXYGEN SATURATION: 97 % | RESPIRATION RATE: 18 BRPM | SYSTOLIC BLOOD PRESSURE: 128 MMHG | BODY MASS INDEX: 30.44 KG/M2 | HEIGHT: 63 IN | WEIGHT: 171.8 LBS

## 2017-05-04 DIAGNOSIS — R41.3 MEMORY DEFICIT: ICD-10-CM

## 2017-05-04 DIAGNOSIS — I10 ESSENTIAL HYPERTENSION: ICD-10-CM

## 2017-05-04 DIAGNOSIS — E78.5 DYSLIPIDEMIA: ICD-10-CM

## 2017-05-04 DIAGNOSIS — E03.2 HYPOTHYROIDISM DUE TO MEDICATION: ICD-10-CM

## 2017-05-04 DIAGNOSIS — E11.9 TYPE 2 DIABETES MELLITUS WITHOUT COMPLICATION, WITH LONG-TERM CURRENT USE OF INSULIN (HCC): Primary | ICD-10-CM

## 2017-05-04 DIAGNOSIS — Z79.4 TYPE 2 DIABETES MELLITUS WITHOUT COMPLICATION, WITH LONG-TERM CURRENT USE OF INSULIN (HCC): Primary | ICD-10-CM

## 2017-05-04 NOTE — PROGRESS NOTES
Chief Complaint   Patient presents with    Diabetes     3 month follow up    1. Have you been to the ER, urgent care clinic since your last visit? Hospitalized since your last visit? No    2. Have you seen or consulted any other health care providers outside of the [facility] since your last visit? Include any pap smears or colon screening.  No

## 2017-05-04 NOTE — PROGRESS NOTES
[address] [place] [address] and Primary Care  [place] [address]  [address] [facility] [address]  Phone:  [phone]  Fax: [phone]       Chief Complaint   Patient presents with    Diabetes     3 month follow up    . SUBJECTIVE:    Candy Vazquez is a 77 y.o. female comes in for return visit stating that she has done fairly well. We talk at length about her diabetes. I emphasize the need for her to eat every four hours during waking hours and to minimize hypoglycemia. She also has to be very consistent in the volume of food that she eats. She reminds me that she feels her memory is not as good as it used to be. She is having some short term memory problems although she is able to take care of all of her needs including cooking and paying her bills. She additionally has a past history of dyslipidemia, primary hypertension, hypothyroidism. Current Outpatient Prescriptions   Medication Sig Dispense Refill    levothyroxine (SYNTHROID) 150 mcg tablet TAKE 1 TABLET BY MOUTH EVERY DAY 30 Tab 11    clopidogrel (PLAVIX) 75 mg tab Take 1 Tab by mouth daily. 30 Tab 11    amLODIPine (NORVASC) 10 mg tablet TAKE 1 TABLET BY MOUTH EVERY MORNING 30 Tab 11    insulin aspart protamine/insulin aspart (NOVOLOG MIX 70-30) 100 unit/mL (70-30) injection 15 Units by SubCUTAneous route Daily (before breakfast). 10 mL 0    Insulin Syringe-Needle U-100 (BD INSULIN SYRINGE ULTRA-FINE) 1/2 mL 30 gauge x 1/2\" syrg Use as directed daily. E11.9 100 Syringe 11    fenofibrate nanocrystallized (TRICOR) 145 mg tablet TAKE 1 TABLET BY MOUTH EVERY DAY 30 Tab 11    brimonidine (ALPHAGAN) 0.15 % ophthalmic solution       latanoprost (XALATAN) 0.005 % ophthalmic solution Administer 1 Drop to both eyes nightly.         losartan-hydrochlorothiazide (HYZAAR) 100-25 mg per tablet TAKE 1 TABLET ONCE A DAY ORALLY 30 Tab 11     Past Medical History:   Diagnosis Date    Diabetes ([address]     Heart failure ([address]     unknown to family    Hypercholesteremia     Hypertension     [facility]     Thyroid disease      Past Surgical History:   Procedure Laterality Date    HX GYN      HX HEENT      thyroidectomy    HX HYSTERECTOMY      REMOVAL GALLBLADDER      THYROIDECTOMY       No Known Allergies      REVIEW OF SYSTEMS:  General: negative for - chills or fever  ENT: negative for - headaches, nasal congestion or tinnitus  Respiratory: negative for - cough, hemoptysis, shortness of breath or wheezing  Cardiovascular : negative for - chest pain, edema, palpitations or shortness of breath  Gastrointestinal: negative for - abdominal pain, blood in stools, heartburn or nausea/vomiting  Genito-Urinary: no dysuria, trouble voiding, or hematuria  Musculoskeletal: negative for - gait disturbance, joint pain, joint stiffness or joint swelling  Neurological: no TIA or stroke symptoms  Hematologic: no bruises, no bleeding, no swollen glands  Integument: no lumps, mole changes, nail changes or rash  Endocrine: no malaise/lethargy or unexpected weight changes      Social History     Social History    Marital status:      Spouse name: N/A    Number of children: 1    Years of education: N/A     Occupational History    retired      Social History Main Topics    Smoking status: Never Smoker    Smokeless tobacco: Never Used    Alcohol use Yes      Comment: been years    Drug use: No    Sexual activity: Not Currently     Other Topics Concern    None     Social History Narrative     Family History   Problem Relation Age of Onset    Diabetes Father     No Known Problems Mother        OBJECTIVE:    Visit Vitals    /72 (BP 1 Location: Left arm, BP Patient Position: Sitting)    Pulse 68    Temp 98.2 °F (36.8 °C) (Oral)    Resp 18    Ht 5' 3\" (1.6 m)    Wt 171 lb 12.8 oz (77.9 kg)    SpO2 97%    BMI 30.43 kg/m2     CONSTITUTIONAL: well , well nourished, appears age appropriate  EYES: perrla, eom intact  ENMT:moist mucous membranes, pharynx clear  NECK: supple. Thyroid normal  RESPIRATORY: Chest: clear to ascultation and percussion   CARDIOVASCULAR: Heart: regular rate and rhythm  GASTROINTESTINAL: Abdomen: soft, bowel sounds active  HEMATOLOGIC: no pathological lymph nodes palpated  MUSCULOSKELETAL: Extremities: no edema, pulse 1+   INTEGUMENT: No unusual rashes or suspicious skin lesions noted. Nails appear normal.  NEUROLOGIC: non-focal exam   MENTAL STATUS: alert and oriented, appropriate affect      ASSESSMENT:  1. Type 2 diabetes mellitus without complication, with long-term current use of insulin ([address]    2. Dyslipidemia    3. Essential hypertension    4. Hypothyroidism due to medication    5. Memory deficit        PLAN:    1. The patient's diabetes hopefully is doing well. I will await the resutls of her hemoglobin A1C. I again remind the patient that the current insulin that she is taking is very potent and ideally is not optimal for her given her history of recurrent insulin reactions. There are other regimens that would be safer and would probably get her to goal o na consistent basis. She again declines this. 2. She will continue her statin as prescribed. 3. Blood pressure is excellent. No adjustments are made. 4. She is also taking a thyroid supplement and her TSH will be assessed to ensure efficacy. 5. She again comments about the defects of her short term memory. 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 There might be a defect but for now I will observe. .  Orders Placed This Encounter    APOLIPOPROTEIN B    HEMOGLOBIN A1C WITH EAG    METABOLIC PANEL, BASIC    TSH 3RD GENERATION    VITAMIN B12         Follow-up Disposition:  Return in about 3 months (around 8/4/2017).       Justine West MD

## 2017-05-04 NOTE — MR AVS SNAPSHOT
Visit Information Date & Time Provider Department Dept. Phone Encounter #  
 5/4/2017  2:45 PM Shaniqua Heck MD [facility] [phone] [id number] Upcoming Health Maintenance Date Due  
 FOOT EXAM Q1 5/8/2016 EYE EXAM RETINAL OR DILATED Q1 5/8/2016 GLAUCOMA SCREENING Q2Y 5/8/2017 MICROALBUMIN Q1 7/14/2017 LIPID PANEL Q1 7/14/2017 MEDICARE YEARLY EXAM 7/15/2017 INFLUENZA AGE 9 TO ADULT 8/1/2017 HEMOGLOBIN A1C Q6M 8/3/2017 Pneumococcal 65+ Low/Medium Risk (2 of 2 - PPSV23) 11/11/2017 DTaP/Tdap/Td series (2 - Td) 2/2/2027 Allergies as of 5/4/2017  Review Complete On: 5/4/2017 By: Ming Mccullough No Known Allergies Current Immunizations  Reviewed on 11/15/2012 Name Date Influenza Vaccine Split  Deferred (Patient Refused) Pneumococcal Vaccine (Unspecified Type) 11/11/2012 12:57 PM  
  
 Not reviewed this visit You Were Diagnosed With   
  
 Codes Comments Type 2 diabetes mellitus without complication, with long-term current use of insulin (HCC)    -  Primary ICD-10-CM: E11.9, Z79.4 ICD-9-CM: 250.00, V58.67 Dyslipidemia     ICD-10-CM: E78.5 ICD-9-CM: 272.4 Essential hypertension     ICD-10-CM: I10 
ICD-9-CM: 401.9 Hypothyroidism due to medication     ICD-10-CM: E03.2 ICD-9-CM: 244.8, E980.5 Memory deficit     ICD-10-CM: R41.3 ICD-9-CM: 780.93 Vitals BP Pulse Temp Resp Height(growth percentile) Weight(growth percentile) 128/72 (BP 1 Location: Left arm, BP Patient Position: Sitting) 68 98.2 °F (36.8 °C) (Oral) 18 5' 3\" (1.6 m) 171 lb 12.8 oz (77.9 kg) SpO2 BMI OB Status Smoking Status 97% 30.43 kg/m2 Hysterectomy Never Smoker BMI and BSA Data Body Mass Index Body Surface Area  
 30.43 kg/m 2 1.86 m 2 Preferred Pharmacy Pharmacy Name Phone [id number] Kesha Tracy, [address] [place] [address] [phone] Your Updated Medication List  
  
   
This list is accurate as of: 5/4/17  4:23 PM.  Always use your most recent med list. amLODIPine 10 mg tablet Commonly known as:  [employer] TAKE 1 TABLET BY MOUTH EVERY MORNING  
  
 brimonidine 0.15 % ophthalmic solution Commonly known as:  ALPHAGAN  
  
 clopidogrel 75 mg Tab Commonly known as:  PLAVIX Take 1 Tab by mouth daily. fenofibrate nanocrystallized 145 mg tablet Commonly known as:  TRICOR  
TAKE 1 TABLET BY MOUTH EVERY DAY  
  
 insulin aspart protamine/insulin aspart 100 unit/mL (70-30) injection Commonly known as:  NovoLOG Mix 70-30  
15 Units by SubCUTAneous route Daily (before breakfast). Insulin Syringe-Needle U-100 1/2 mL 30 gauge x 1/2\" Syrg Commonly known as:  BD INSULIN SYRINGE ULTRA-FINE Use as directed daily. E11.9  
  
 latanoprost 0.005 % ophthalmic solution Commonly known as:  Andrés Spann Administer 1 Drop to both eyes nightly. levothyroxine 150 mcg tablet Commonly known as:  SYNTHROID  
TAKE 1 TABLET BY MOUTH EVERY DAY  
  
 losartan-hydroCHLOROthiazide 100-25 mg per tablet Commonly known as:  HYZAAR  
TAKE 1 TABLET ONCE A DAY ORALLY We Performed the Following APOLIPOPROTEIN B [id number] CPT(R)] HEMOGLOBIN A1C WITH EAG [02469 CPT(R)] METABOLIC PANEL, BASIC [14175 CPT(R)] TSH 3RD GENERATION [12066 CPT(R)] VITAMIN B12 [id number] CPT(R)] Introducing [facility]! Shaheed White introduces [employer] patient portal. Now you can access parts of your medical record, email your doctor's office, and request medication refills online. 1. In your internet browser, go to [email or web address] (acquired by [employer] in 2014). [employer] (acquired by [employer] in 2014) 2. Click on the First Time User? Click Here link in the Sign In box. You will see the New Member Sign Up page. 3. Enter your [employer] Access Code exactly as it appears below. You will not need to use this code after youve completed the sign-up process.  If you do not sign up before the expiration date, you must request a new code. · [employer] Access Code: [email or web address] Expires: 8/2/2017  4:23 PM 
 
4. Enter the last four digits of your Social Security Number (xxxx) and Date of Birth (mm/dd/yyyy) as indicated and click Submit. You will be taken to the next sign-up page. 5. Create a [employer] ID. This will be your [employer] login ID and cannot be changed, so think of one that is secure and easy to remember. 6. Create a [employer] password. You can change your password at any time. 7. Enter your Password Reset Question and Answer. This can be used at a later time if you forget your password. 8. Enter your e-mail address. You will receive e-mail notification when new information is available in [address] 9. Click Sign Up. You can now view and download portions of your medical record. 10. Click the Download Summary menu link to download a portable copy of your medical information. If you have questions, please visit the Frequently Asked Questions section of the [employer] website. Remember, [employer] is NOT to be used for urgent needs. For medical emergencies, dial 911. Now available from your iPhone and Android! Please provide this summary of care documentation to your next provider. Your primary care clinician is listed as William Doll. If you have any questions after today's visit, please call [phone].

## 2017-05-05 LAB
APO B SERPL-MCNC: 63 MG/DL (ref 54–133)
BUN SERPL-MCNC: 17 MG/DL (ref 8–27)
BUN/CREAT SERPL: 24 (ref 12–28)
CALCIUM SERPL-MCNC: 8.8 MG/DL (ref 8.7–10.3)
CHLORIDE SERPL-SCNC: 102 MMOL/L (ref 96–106)
CO2 SERPL-SCNC: 24 MMOL/L (ref 18–29)
CREAT SERPL-MCNC: 0.7 MG/DL (ref 0.57–1)
EST. AVERAGE GLUCOSE BLD GHB EST-MCNC: 214 MG/DL
GLUCOSE SERPL-MCNC: 69 MG/DL (ref 65–99)
HBA1C MFR BLD: 9.1 % (ref 4.8–5.6)
POTASSIUM SERPL-SCNC: 4 MMOL/L (ref 3.5–5.2)
SODIUM SERPL-SCNC: 143 MMOL/L (ref 134–144)
TSH SERPL DL<=0.005 MIU/L-ACNC: 1.06 UIU/ML (ref 0.45–4.5)
VIT B12 SERPL-MCNC: 757 PG/ML (ref 211–946)

## 2017-06-02 ENCOUNTER — APPOINTMENT (OUTPATIENT)
Dept: GENERAL RADIOLOGY | Age: 77
DRG: 638 | End: 2017-06-02
Attending: INTERNAL MEDICINE
Payer: MEDICARE

## 2017-06-02 ENCOUNTER — HOSPITAL ENCOUNTER (INPATIENT)
Age: 77
LOS: 4 days | Discharge: HOME | DRG: 638 | End: 2017-06-06
Attending: EMERGENCY MEDICINE | Admitting: INTERNAL MEDICINE
Payer: MEDICARE

## 2017-06-02 DIAGNOSIS — E87.20 ACIDEMIA: ICD-10-CM

## 2017-06-02 DIAGNOSIS — R73.9 HYPERGLYCEMIA: Primary | ICD-10-CM

## 2017-06-02 PROBLEM — E11.10 DKA (DIABETIC KETOACIDOSES): Status: ACTIVE | Noted: 2017-06-02

## 2017-06-02 LAB
ALBUMIN SERPL BCP-MCNC: 3.8 G/DL (ref 3.5–5)
ALBUMIN/GLOB SERPL: 1.2 {RATIO} (ref 1.1–2.2)
ALP SERPL-CCNC: 100 U/L (ref 45–117)
ALT SERPL-CCNC: 41 U/L (ref 12–78)
ANION GAP BLD CALC-SCNC: 14 MMOL/L (ref 5–15)
ANION GAP BLD CALC-SCNC: 26 MMOL/L (ref 5–15)
ANION GAP BLD CALC-SCNC: 29 MMOL/L (ref 5–15)
APPEARANCE UR: ABNORMAL
ARTERIAL PATENCY WRIST A: YES
AST SERPL W P-5'-P-CCNC: 27 U/L (ref 15–37)
BACTERIA URNS QL MICRO: NEGATIVE /HPF
BASE DEFICIT BLDA-SCNC: 18.7 MMOL/L
BASOPHILS # BLD AUTO: 0 K/UL (ref 0–0.1)
BASOPHILS # BLD AUTO: 0 K/UL (ref 0–0.1)
BASOPHILS # BLD: 0 % (ref 0–1)
BASOPHILS # BLD: 0 % (ref 0–1)
BDY SITE: ABNORMAL
BILIRUB SERPL-MCNC: 0.7 MG/DL (ref 0.2–1)
BILIRUB UR QL: NEGATIVE
BREATHS.SPONTANEOUS ON VENT: 18
BUN SERPL-MCNC: 43 MG/DL (ref 6–20)
BUN SERPL-MCNC: 44 MG/DL (ref 6–20)
BUN SERPL-MCNC: 47 MG/DL (ref 6–20)
BUN/CREAT SERPL: 23 (ref 12–20)
BUN/CREAT SERPL: 24 (ref 12–20)
BUN/CREAT SERPL: 26 (ref 12–20)
CALCIUM SERPL-MCNC: 8 MG/DL (ref 8.5–10.1)
CALCIUM SERPL-MCNC: 8.1 MG/DL (ref 8.5–10.1)
CALCIUM SERPL-MCNC: 9 MG/DL (ref 8.5–10.1)
CHLORIDE SERPL-SCNC: 105 MMOL/L (ref 97–108)
CHLORIDE SERPL-SCNC: 113 MMOL/L (ref 97–108)
CHLORIDE SERPL-SCNC: 93 MMOL/L (ref 97–108)
CO2 SERPL-SCNC: 18 MMOL/L (ref 21–32)
CO2 SERPL-SCNC: 7 MMOL/L (ref 21–32)
CO2 SERPL-SCNC: 8 MMOL/L (ref 21–32)
COLOR UR: ABNORMAL
CREAT SERPL-MCNC: 1.63 MG/DL (ref 0.55–1.02)
CREAT SERPL-MCNC: 1.88 MG/DL (ref 0.55–1.02)
CREAT SERPL-MCNC: 2 MG/DL (ref 0.55–1.02)
DIFFERENTIAL METHOD BLD: ABNORMAL
EOSINOPHIL # BLD: 0 K/UL (ref 0–0.4)
EOSINOPHIL # BLD: 0 K/UL (ref 0–0.4)
EOSINOPHIL NFR BLD: 0 % (ref 0–7)
EOSINOPHIL NFR BLD: 0 % (ref 0–7)
EPITH CASTS URNS QL MICRO: ABNORMAL /LPF
ERYTHROCYTE [DISTWIDTH] IN BLOOD BY AUTOMATED COUNT: 14.2 % (ref 11.5–14.5)
ERYTHROCYTE [DISTWIDTH] IN BLOOD BY AUTOMATED COUNT: 14.3 % (ref 11.5–14.5)
EST. AVERAGE GLUCOSE BLD GHB EST-MCNC: 217 MG/DL
GLOBULIN SER CALC-MCNC: 3.1 G/DL (ref 2–4)
GLUCOSE BLD STRIP.AUTO-MCNC: 199 MG/DL (ref 65–100)
GLUCOSE BLD STRIP.AUTO-MCNC: 225 MG/DL (ref 65–100)
GLUCOSE BLD STRIP.AUTO-MCNC: 337 MG/DL (ref 65–100)
GLUCOSE BLD STRIP.AUTO-MCNC: 415 MG/DL (ref 65–100)
GLUCOSE BLD STRIP.AUTO-MCNC: 532 MG/DL (ref 65–100)
GLUCOSE BLD STRIP.AUTO-MCNC: >600 MG/DL (ref 65–100)
GLUCOSE SERPL-MCNC: 262 MG/DL (ref 65–100)
GLUCOSE SERPL-MCNC: 576 MG/DL (ref 65–100)
GLUCOSE SERPL-MCNC: 861 MG/DL (ref 65–100)
GLUCOSE UR STRIP.AUTO-MCNC: >1000 MG/DL
HBA1C MFR BLD: 9.2 % (ref 4.2–6.3)
HCO3 BLDA-SCNC: 6 MMOL/L (ref 22–26)
HCT VFR BLD AUTO: 31 % (ref 35–47)
HCT VFR BLD AUTO: 34.5 % (ref 35–47)
HGB BLD-MCNC: 10.3 G/DL (ref 11.5–16)
HGB BLD-MCNC: 11.4 G/DL (ref 11.5–16)
HGB UR QL STRIP: NEGATIVE
HYALINE CASTS URNS QL MICRO: ABNORMAL /LPF (ref 0–5)
KETONES UR QL STRIP.AUTO: 40 MG/DL
LACTATE SERPL-SCNC: 4.5 MMOL/L (ref 0.4–2)
LACTATE SERPL-SCNC: 5.3 MMOL/L (ref 0.4–2)
LEUKOCYTE ESTERASE UR QL STRIP.AUTO: NEGATIVE
LYMPHOCYTES # BLD AUTO: 8 % (ref 12–49)
LYMPHOCYTES # BLD AUTO: 8 % (ref 12–49)
LYMPHOCYTES # BLD: 1.1 K/UL (ref 0.8–3.5)
LYMPHOCYTES # BLD: 1.3 K/UL (ref 0.8–3.5)
MAGNESIUM SERPL-MCNC: 2.7 MG/DL (ref 1.6–2.4)
MAGNESIUM SERPL-MCNC: 2.8 MG/DL (ref 1.6–2.4)
MAGNESIUM SERPL-MCNC: 2.9 MG/DL (ref 1.6–2.4)
MCH RBC QN AUTO: 31.7 PG (ref 26–34)
MCH RBC QN AUTO: 31.8 PG (ref 26–34)
MCHC RBC AUTO-ENTMCNC: 33 G/DL (ref 30–36.5)
MCHC RBC AUTO-ENTMCNC: 33.2 G/DL (ref 30–36.5)
MCV RBC AUTO: 95.4 FL (ref 80–99)
MCV RBC AUTO: 96.4 FL (ref 80–99)
MONOCYTES # BLD: 0.6 K/UL (ref 0–1)
MONOCYTES # BLD: 0.9 K/UL (ref 0–1)
MONOCYTES NFR BLD AUTO: 4 % (ref 5–13)
MONOCYTES NFR BLD AUTO: 7 % (ref 5–13)
NEUTS SEG # BLD: 11.4 K/UL (ref 1.8–8)
NEUTS SEG # BLD: 14.9 K/UL (ref 1.8–8)
NEUTS SEG NFR BLD AUTO: 85 % (ref 32–75)
NEUTS SEG NFR BLD AUTO: 88 % (ref 32–75)
NITRITE UR QL STRIP.AUTO: NEGATIVE
PCO2 BLDA: 14 MMHG (ref 35–45)
PH BLDA: 7.24 [PH] (ref 7.35–7.45)
PH UR STRIP: 5 [PH] (ref 5–8)
PHOSPHATE SERPL-MCNC: 5.3 MG/DL (ref 2.6–4.7)
PHOSPHATE SERPL-MCNC: 8.4 MG/DL (ref 2.6–4.7)
PLATELET # BLD AUTO: 295 K/UL (ref 150–400)
PLATELET # BLD AUTO: 309 K/UL (ref 150–400)
PO2 BLDA: 123 MMHG (ref 80–100)
POTASSIUM SERPL-SCNC: 3.4 MMOL/L (ref 3.5–5.1)
POTASSIUM SERPL-SCNC: 4.3 MMOL/L (ref 3.5–5.1)
POTASSIUM SERPL-SCNC: 5.8 MMOL/L (ref 3.5–5.1)
PROT SERPL-MCNC: 6.9 G/DL (ref 6.4–8.2)
PROT UR STRIP-MCNC: NEGATIVE MG/DL
RBC # BLD AUTO: 3.25 M/UL (ref 3.8–5.2)
RBC # BLD AUTO: 3.58 M/UL (ref 3.8–5.2)
RBC #/AREA URNS HPF: ABNORMAL /HPF (ref 0–5)
RBC MORPH BLD: ABNORMAL
SAO2 % BLD: 98 % (ref 92–97)
SAO2% DEVICE SAO2% SENSOR NAME: ABNORMAL
SERVICE CMNT-IMP: ABNORMAL
SODIUM SERPL-SCNC: 130 MMOL/L (ref 136–145)
SODIUM SERPL-SCNC: 138 MMOL/L (ref 136–145)
SODIUM SERPL-SCNC: 145 MMOL/L (ref 136–145)
SP GR UR REFRACTOMETRY: 1.02 (ref 1–1.03)
SPECIMEN SITE: ABNORMAL
TROPONIN I SERPL-MCNC: <0.04 NG/ML
UA: UC IF INDICATED,UAUC: ABNORMAL
UROBILINOGEN UR QL STRIP.AUTO: 0.2 EU/DL (ref 0.2–1)
WBC # BLD AUTO: 13.4 K/UL (ref 3.6–11)
WBC # BLD AUTO: 16.9 K/UL (ref 3.6–11)
WBC URNS QL MICRO: ABNORMAL /HPF (ref 0–4)

## 2017-06-02 PROCEDURE — 80048 BASIC METABOLIC PNL TOTAL CA: CPT | Performed by: INTERNAL MEDICINE

## 2017-06-02 PROCEDURE — 83735 ASSAY OF MAGNESIUM: CPT | Performed by: EMERGENCY MEDICINE

## 2017-06-02 PROCEDURE — 74011250636 HC RX REV CODE- 250/636: Performed by: EMERGENCY MEDICINE

## 2017-06-02 PROCEDURE — 83735 ASSAY OF MAGNESIUM: CPT | Performed by: INTERNAL MEDICINE

## 2017-06-02 PROCEDURE — 71010 XR CHEST PORT: CPT

## 2017-06-02 PROCEDURE — 65270000029 HC RM PRIVATE

## 2017-06-02 PROCEDURE — 83036 HEMOGLOBIN GLYCOSYLATED A1C: CPT | Performed by: INTERNAL MEDICINE

## 2017-06-02 PROCEDURE — 83605 ASSAY OF LACTIC ACID: CPT | Performed by: INTERNAL MEDICINE

## 2017-06-02 PROCEDURE — 96374 THER/PROPH/DIAG INJ IV PUSH: CPT

## 2017-06-02 PROCEDURE — 80053 COMPREHEN METABOLIC PANEL: CPT | Performed by: EMERGENCY MEDICINE

## 2017-06-02 PROCEDURE — 36415 COLL VENOUS BLD VENIPUNCTURE: CPT | Performed by: INTERNAL MEDICINE

## 2017-06-02 PROCEDURE — 74011250636 HC RX REV CODE- 250/636: Performed by: INTERNAL MEDICINE

## 2017-06-02 PROCEDURE — 81001 URINALYSIS AUTO W/SCOPE: CPT | Performed by: INTERNAL MEDICINE

## 2017-06-02 PROCEDURE — 74011000250 HC RX REV CODE- 250: Performed by: INTERNAL MEDICINE

## 2017-06-02 PROCEDURE — 74011000258 HC RX REV CODE- 258: Performed by: INTERNAL MEDICINE

## 2017-06-02 PROCEDURE — 99285 EMERGENCY DEPT VISIT HI MDM: CPT

## 2017-06-02 PROCEDURE — 83605 ASSAY OF LACTIC ACID: CPT | Performed by: EMERGENCY MEDICINE

## 2017-06-02 PROCEDURE — 74011636637 HC RX REV CODE- 636/637: Performed by: EMERGENCY MEDICINE

## 2017-06-02 PROCEDURE — 96361 HYDRATE IV INFUSION ADD-ON: CPT

## 2017-06-02 PROCEDURE — 82803 BLOOD GASES ANY COMBINATION: CPT | Performed by: EMERGENCY MEDICINE

## 2017-06-02 PROCEDURE — 36600 WITHDRAWAL OF ARTERIAL BLOOD: CPT | Performed by: EMERGENCY MEDICINE

## 2017-06-02 PROCEDURE — 93005 ELECTROCARDIOGRAM TRACING: CPT

## 2017-06-02 PROCEDURE — 84484 ASSAY OF TROPONIN QUANT: CPT | Performed by: INTERNAL MEDICINE

## 2017-06-02 PROCEDURE — 84100 ASSAY OF PHOSPHORUS: CPT | Performed by: EMERGENCY MEDICINE

## 2017-06-02 PROCEDURE — 82962 GLUCOSE BLOOD TEST: CPT

## 2017-06-02 PROCEDURE — 84100 ASSAY OF PHOSPHORUS: CPT | Performed by: INTERNAL MEDICINE

## 2017-06-02 PROCEDURE — 65660000000 HC RM CCU STEPDOWN

## 2017-06-02 PROCEDURE — 74011000258 HC RX REV CODE- 258: Performed by: EMERGENCY MEDICINE

## 2017-06-02 PROCEDURE — 87040 BLOOD CULTURE FOR BACTERIA: CPT | Performed by: EMERGENCY MEDICINE

## 2017-06-02 PROCEDURE — 85025 COMPLETE CBC W/AUTO DIFF WBC: CPT | Performed by: EMERGENCY MEDICINE

## 2017-06-02 RX ORDER — INSULIN LISPRO 100 [IU]/ML
INJECTION, SOLUTION INTRAVENOUS; SUBCUTANEOUS
Status: DISCONTINUED | OUTPATIENT
Start: 2017-06-02 | End: 2017-06-02 | Stop reason: SDUPTHER

## 2017-06-02 RX ORDER — DEXTROSE MONOHYDRATE 100 MG/ML
125-250 INJECTION, SOLUTION INTRAVENOUS AS NEEDED
Status: DISCONTINUED | OUTPATIENT
Start: 2017-06-02 | End: 2017-06-02

## 2017-06-02 RX ORDER — DEXTROSE MONOHYDRATE 100 MG/ML
125-250 INJECTION, SOLUTION INTRAVENOUS AS NEEDED
Status: DISCONTINUED | OUTPATIENT
Start: 2017-06-02 | End: 2017-06-06 | Stop reason: HOSPADM

## 2017-06-02 RX ORDER — MAGNESIUM SULFATE 100 %
4 CRYSTALS MISCELLANEOUS AS NEEDED
Status: DISCONTINUED | OUTPATIENT
Start: 2017-06-02 | End: 2017-06-02 | Stop reason: SDUPTHER

## 2017-06-02 RX ORDER — FENOFIBRATE 145 MG/1
145 TABLET, COATED ORAL DAILY
Status: DISCONTINUED | OUTPATIENT
Start: 2017-06-03 | End: 2017-06-06 | Stop reason: HOSPADM

## 2017-06-02 RX ORDER — INSULIN LISPRO 100 [IU]/ML
INJECTION, SOLUTION INTRAVENOUS; SUBCUTANEOUS
Status: DISCONTINUED | OUTPATIENT
Start: 2017-06-02 | End: 2017-06-06

## 2017-06-02 RX ORDER — MAGNESIUM SULFATE 100 %
4 CRYSTALS MISCELLANEOUS AS NEEDED
Status: DISCONTINUED | OUTPATIENT
Start: 2017-06-02 | End: 2017-06-06 | Stop reason: HOSPADM

## 2017-06-02 RX ORDER — SODIUM CHLORIDE 9 MG/ML
125 INJECTION, SOLUTION INTRAVENOUS CONTINUOUS
Status: DISCONTINUED | OUTPATIENT
Start: 2017-06-02 | End: 2017-06-03

## 2017-06-02 RX ORDER — ACETAMINOPHEN 325 MG/1
650 TABLET ORAL
Status: DISCONTINUED | OUTPATIENT
Start: 2017-06-02 | End: 2017-06-06 | Stop reason: HOSPADM

## 2017-06-02 RX ORDER — AMLODIPINE BESYLATE 5 MG/1
10 TABLET ORAL DAILY
Status: DISCONTINUED | OUTPATIENT
Start: 2017-06-03 | End: 2017-06-06 | Stop reason: HOSPADM

## 2017-06-02 RX ORDER — SODIUM CHLORIDE 0.9 % (FLUSH) 0.9 %
5-10 SYRINGE (ML) INJECTION AS NEEDED
Status: DISCONTINUED | OUTPATIENT
Start: 2017-06-02 | End: 2017-06-06 | Stop reason: HOSPADM

## 2017-06-02 RX ORDER — LATANOPROST 50 UG/ML
1 SOLUTION/ DROPS OPHTHALMIC
Status: DISCONTINUED | OUTPATIENT
Start: 2017-06-02 | End: 2017-06-06 | Stop reason: HOSPADM

## 2017-06-02 RX ORDER — HYDRALAZINE HYDROCHLORIDE 20 MG/ML
10 INJECTION INTRAMUSCULAR; INTRAVENOUS
Status: DISCONTINUED | OUTPATIENT
Start: 2017-06-02 | End: 2017-06-04

## 2017-06-02 RX ORDER — SODIUM CHLORIDE 0.9 % (FLUSH) 0.9 %
5-10 SYRINGE (ML) INJECTION EVERY 8 HOURS
Status: DISCONTINUED | OUTPATIENT
Start: 2017-06-02 | End: 2017-06-06 | Stop reason: HOSPADM

## 2017-06-02 RX ORDER — LEVOTHYROXINE SODIUM 150 UG/1
150 TABLET ORAL
Status: DISCONTINUED | OUTPATIENT
Start: 2017-06-03 | End: 2017-06-06 | Stop reason: HOSPADM

## 2017-06-02 RX ORDER — ENOXAPARIN SODIUM 100 MG/ML
40 INJECTION SUBCUTANEOUS EVERY 24 HOURS
Status: DISCONTINUED | OUTPATIENT
Start: 2017-06-02 | End: 2017-06-02

## 2017-06-02 RX ORDER — CLOPIDOGREL BISULFATE 75 MG/1
75 TABLET ORAL DAILY
Status: DISCONTINUED | OUTPATIENT
Start: 2017-06-03 | End: 2017-06-06 | Stop reason: HOSPADM

## 2017-06-02 RX ORDER — ENOXAPARIN SODIUM 100 MG/ML
30 INJECTION SUBCUTANEOUS EVERY 24 HOURS
Status: DISCONTINUED | OUTPATIENT
Start: 2017-06-02 | End: 2017-06-02

## 2017-06-02 RX ORDER — DEXTROSE MONOHYDRATE AND SODIUM CHLORIDE 5; .9 G/100ML; G/100ML
125 INJECTION, SOLUTION INTRAVENOUS CONTINUOUS
Status: DISCONTINUED | OUTPATIENT
Start: 2017-06-03 | End: 2017-06-03

## 2017-06-02 RX ORDER — DEXTROSE MONOHYDRATE AND SODIUM CHLORIDE 5; .45 G/100ML; G/100ML
75 INJECTION, SOLUTION INTRAVENOUS CONTINUOUS
Status: DISCONTINUED | OUTPATIENT
Start: 2017-06-03 | End: 2017-06-04

## 2017-06-02 RX ORDER — BISACODYL 5 MG
5 TABLET, DELAYED RELEASE (ENTERIC COATED) ORAL DAILY PRN
Status: DISCONTINUED | OUTPATIENT
Start: 2017-06-02 | End: 2017-06-06 | Stop reason: HOSPADM

## 2017-06-02 RX ORDER — DEXTROSE MONOHYDRATE AND SODIUM CHLORIDE 5; .225 G/100ML; G/100ML
125 INJECTION, SOLUTION INTRAVENOUS CONTINUOUS
Status: DISCONTINUED | OUTPATIENT
Start: 2017-06-02 | End: 2017-06-02

## 2017-06-02 RX ORDER — DEXTROSE 50 % IN WATER (D50W) INTRAVENOUS SYRINGE
12.5-25 AS NEEDED
Status: DISCONTINUED | OUTPATIENT
Start: 2017-06-02 | End: 2017-06-02 | Stop reason: SDUPTHER

## 2017-06-02 RX ORDER — HEPARIN SODIUM 5000 [USP'U]/ML
5000 INJECTION, SOLUTION INTRAVENOUS; SUBCUTANEOUS EVERY 12 HOURS
Status: DISCONTINUED | OUTPATIENT
Start: 2017-06-02 | End: 2017-06-06 | Stop reason: HOSPADM

## 2017-06-02 RX ORDER — ONDANSETRON 2 MG/ML
4 INJECTION INTRAMUSCULAR; INTRAVENOUS
Status: DISCONTINUED | OUTPATIENT
Start: 2017-06-02 | End: 2017-06-06 | Stop reason: HOSPADM

## 2017-06-02 RX ORDER — SODIUM POLYSTYRENE SULFONATE 15 G/60ML
15 SUSPENSION ORAL; RECTAL
Status: DISCONTINUED | OUTPATIENT
Start: 2017-06-02 | End: 2017-06-02

## 2017-06-02 RX ADMIN — Medication 10 ML: at 23:41

## 2017-06-02 RX ADMIN — SODIUM CHLORIDE 18.9 UNITS/HR: 900 INJECTION, SOLUTION INTRAVENOUS at 18:26

## 2017-06-02 RX ADMIN — SODIUM CHLORIDE 8.3 UNITS/HR: 900 INJECTION, SOLUTION INTRAVENOUS at 22:30

## 2017-06-02 RX ADMIN — Medication 10 ML: at 16:46

## 2017-06-02 RX ADMIN — DEXTROSE MONOHYDRATE AND SODIUM CHLORIDE 125 ML/HR: 5; .9 INJECTION, SOLUTION INTRAVENOUS at 23:45

## 2017-06-02 RX ADMIN — SODIUM CHLORIDE 1000 ML: 900 INJECTION, SOLUTION INTRAVENOUS at 14:09

## 2017-06-02 RX ADMIN — SODIUM CHLORIDE 10.8 UNITS/HR: 900 INJECTION, SOLUTION INTRAVENOUS at 16:18

## 2017-06-02 RX ADMIN — WATER: 1000 INJECTION, SOLUTION INTRAVENOUS at 20:23

## 2017-06-02 RX ADMIN — HEPARIN SODIUM 5000 UNITS: 5000 INJECTION, SOLUTION INTRAVENOUS; SUBCUTANEOUS at 17:28

## 2017-06-02 RX ADMIN — SODIUM CHLORIDE 1500 ML: 900 INJECTION, SOLUTION INTRAVENOUS at 14:29

## 2017-06-02 RX ADMIN — SODIUM CHLORIDE 125 ML/HR: 900 INJECTION, SOLUTION INTRAVENOUS at 16:50

## 2017-06-02 NOTE — ED NOTES
TRANSFER - OUT REPORT:    Verbal report given to [facility] (name) on Marisela Pena  being transferred to PCU (unit) for routine progression of care       Report consisted of patients Situation, Background, Assessment and   Recommendations(SBAR). Information from the following report(s) SBAR, Kardex, ED Summary, [facility] and Recent Results was reviewed with the receiving nurse. Lines:   Peripheral IV 06/02/17 Right Wrist (Active)   Site Assessment Clean, dry, & intact 6/2/2017  2:25 PM   Phlebitis Assessment 0 6/2/2017  2:25 PM   Infiltration Assessment 0 6/2/2017  2:25 PM   Dressing Status Clean, dry, & intact 6/2/2017  2:25 PM   Dressing Type Transparent 6/2/2017  2:25 PM   Hub Color/Line Status Green;Capped;Flushed 6/2/2017  2:25 PM       Peripheral IV 06/02/17 Left Wrist (Active)   Site Assessment Clean, dry, & intact 6/2/2017  4:22 PM   Phlebitis Assessment 0 6/2/2017  4:22 PM   Infiltration Assessment 0 6/2/2017  4:22 PM   Dressing Status Clean, dry, & intact 6/2/2017  4:22 PM   Dressing Type Tape;Transparent 6/2/2017  4:22 PM   Hub Color/Line Status Pink 6/2/2017  4:22 PM        Opportunity for questions and clarification was provided.       Patient transported with:   Monitor  Registered Nurse  Tech

## 2017-06-02 NOTE — PROGRESS NOTES
Pt settled into room had large smelly diarrhea,also labs still very abnormal.  Dr Tera Pereira paged about wbc, H&H, Lactate, and pCO2 add the diarrhea.

## 2017-06-02 NOTE — Clinical Note
Status[de-identified] Inpatient [101] Type of Bed: Telemetry [19] Inpatient Hospitalization Certified Necessary for the Following Reasons: 3. Patient receiving treatment that can only be provided in an inpatient setting (further clarification in H&P documentation) Admitting Diagnosis: [facility] ketoacidoses[facility] [[id number]] Admitting Physician: Marysol North, [facility]2 [facility] 95 Attending Physician: Dotty Roque Estimated Length of Stay: > or = to 2 Midnights Discharge Plan[de-identified] Home with Office Follow-up

## 2017-06-02 NOTE — H&P
Hospitalist Admission Note    NAME: Cris Mitchell   :  1940   MRN:  [id number]     Date/Time:  2017 4:17 PM    Patient PCP: Roya Bates MD  ________________________________________________________________________    My assessment of this patient's clinical condition and my plan of care is as follows. Assessment / Plan:  SIRS  Diabetic Ketoacidosis   -likely precipitated by insulin deficiency poor compliance as pt had not been taking insulin for 1 week. Will r/o infection including PNA, UTI  -checking troponin  -A1C last checked 9.1  -NPO  -strict I/Os  -BMP, Mg, PO4 q4h  -aggressive IVF fluid hydration with NS; change to D5 1/2NS when blood glucose <200 x 2 checks  -insulin gtt until AG resolves; then overlap with SC insulin for 3-4h to avoid re-entry into DKA  -prn antiemetics   -diabetic education    Leukocytosis  -likely reactive due to DKA, however will r/o infection as above  -repeat cbc    ONEYDA  -baseline cr 0.7, here with cr 2  -due to dehydration  -IVF as above, holding nephrotoxic agents  -monitor bmp    AGMA  Lactic acidosis  -due to DKA and dehydration  -bicarb 7 on repeat bmp, will start bicarb gtt and re-evaluate in the AM  -IVF and insulin as above    Hyponatremia  -likely osmotic shift due to hyperglycemia, expect to improve with resolution of hyperglycemia  -repeat bmp    Hyperkalemia  K 5.8, expect to improve with insulin   -repeat bmp in 4 hrs, if not resolve, then give kayexalate    HTN  -cont' amlodipine  -hold hyzaar  -hydralazine prn    Hypothyroidism  -cont' synthroid    Code Status: Full  Surrogate Decision Maker: Juan Alberto Srivastava- [phone]    DVT Prophylaxis: heparin  GI Prophylaxis: not indicated    Baseline: independent, lives with son. Manages her own medicine        Subjective:   CHIEF COMPLAINT: confused at home    HISTORY OF PRESENT ILLNESS:     Cris Mitchell is a 77 y.o.    female who hx of T2DM, hypertension, HLD, CHF, hypothyroidism present to ED due to altered mental status as per pt's son. Patient was noted to be unresponsive this AM.  Was not eating for a few days. Son states she hasn't been taking her insulin for about 1 week due to unclear reason. Pt at baseline is alert and oriented, however during my encounter, she was mildly confused, saying yes to all the questions, not knowing where she was or how she ended up here. I called pt's son and was able to get the information above. He states, she lives with him and normally manages her own medications at home. There was no reported fever, chills, cp, sob, palpitations, n/v/d or any other complaints other than not eating and was altered. In the ED, vitals: Tm 100.1, P 113, /38   presents unresponsive to anything that was giving to her to eat. Pertinent labs: Na 130, K 5.8, gluc 861, AG 27, CO2 8, cr 2, lactic acid 4.5. Patient was started on insulin gtt and was given IVF. We were asked to admit for work up and evaluation of the above problems. Past Medical History:   Diagnosis Date    Diabetes ([address]     Heart failure ([address]     unknown to family    Hypercholesteremia     Hypertension     [facility]     Thyroid disease         Past Surgical History:   Procedure Laterality Date    HX GYN      HX HEENT      thyroidectomy    HX HYSTERECTOMY      REMOVAL GALLBLADDER      THYROIDECTOMY         Social History   Substance Use Topics    Smoking status: Never Smoker    Smokeless tobacco: Never Used    Alcohol use Yes      Comment: been years        Family History   Problem Relation Age of Onset    Diabetes Father     No Known Problems Mother      No Known Allergies     Prior to Admission medications    Medication Sig Start Date End Date Taking? Authorizing Provider   levothyroxine (SYNTHROID) 150 mcg tablet TAKE 1 TABLET BY MOUTH EVERY DAY 4/2/17   Brian Hilliard MD   clopidogrel (PLAVIX) 75 mg tab Take 1 Tab by mouth daily.  3/21/17   Brian Hilliard MD amLODIPine (NORVASC) 10 mg tablet TAKE 1 TABLET BY MOUTH EVERY MORNING 3/9/17   Gonzalez Crenshaw MD   insulin aspart protamine/insulin aspart (NOVOLOG MIX 70-30) 100 unit/mL (70-30) injection 15 Units by SubCUTAneous route Daily (before breakfast). 2/20/17   Zaida Avina MD   Insulin Syringe-Needle U-100 (BD INSULIN SYRINGE ULTRA-FINE) 1/2 mL 30 gauge x 1/2\" syrg Use as directed daily. E11.9 1/10/17   Kristen Prakash MD   fenofibrate nanocrystallized (TRICOR) 145 mg tablet TAKE 1 TABLET BY MOUTH EVERY DAY 6/8/16   Gonzalez Crenshaw MD   losartan-hydrochlorothiazide [facility]) 100-25 mg per tablet TAKE 1 TABLET ONCE A DAY ORALLY 5/11/16   Gonzalez Crenshaw MD   brimonidine (ALPHAGAN) 0.15 % ophthalmic solution  1/30/15   Historical Provider   latanoprost (XALATAN) 0.005 % ophthalmic solution Administer 1 Drop to both eyes nightly. Historical Provider       REVIEW OF SYSTEMS:     I am not able to complete the review of systems because:    The patient is intubated and sedated    The patient has altered mental status due to his acute medical problems    The patient has baseline aphasia from prior stroke(s)    The patient has baseline dementia and is not reliable historian    The patient is in acute medical distress and unable to provide information           Total of 12 systems reviewed as follows:       POSITIVE= underlined text  Negative = text not underlined  General:  fever, chills, sweats, generalized weakness, weight loss/gain,      loss of appetite   Eyes:    blurred vision, eye pain, loss of vision, double vision  ENT:    rhinorrhea, pharyngitis   Respiratory:   cough, sputum production, SOB, AGUILERA, wheezing, pleuritic pain   Cardiology:   chest pain, palpitations, orthopnea, PND, edema, syncope   Gastrointestinal:  abdominal pain , N/V, diarrhea, dysphagia, constipation, bleeding   Genitourinary:  frequency, urgency, dysuria, hematuria, incontinence   Muskuloskeletal :  arthralgia, myalgia, back pain  Hematology:  easy bruising, nose or gum bleeding, lymphadenopathy   Dermatological: rash, ulceration, pruritis, color change / jaundice  Endocrine:   hot flashes or polydipsia   Neurological:  headache, dizziness, confusion, focal weakness, paresthesia,     Speech difficulties, memory loss, gait difficulty  Psychological: Feelings of anxiety, depression, agitation    Objective:   VITALS:    Visit Vitals    /42    Pulse (!) 107    Temp 100.1 °F (37.8 °C)    Resp 22    Ht 5' 3\" (1.6 m)    Wt 79.4 kg (175 lb)    SpO2 100%    BMI 31 kg/m2       PHYSICAL EXAM:    General:    Alert, cooperative, no distress, appears stated age. HEENT: Atraumatic, anicteric sclerae, pink conjunctivae     No oral ulcers, mucosa moist, throat clear, dentition fair  Neck:  Supple, symmetrical,  thyroid: non tender  Lungs:   Clear to auscultation bilaterally. No Wheezing or Rhonchi. No rales. Chest wall:  No tenderness  No Accessory muscle use. Heart:   Regular  rhythm,  No  murmur   No edema  Abdomen:   Soft, non-tender. Not distended. Bowel sounds normal  Extremities: No cyanosis. No clubbing,      Skin turgor normal, Capillary refill normal, Radial dial pulse 2+  Skin:     Not pale. Not Jaundiced  No rashes   Psych:  Good insight. Not depressed. Not anxious or agitated. Neurologic: EOMs intact. No facial asymmetry. No aphasia or slurred speech. AAOx1, able to move all exts.      _______________________________________________________________________  Care Plan discussed with:    Comments   Patient x    Family  x son   RN x    Care Manager                    Consultant:      _______________________________________________________________________  Expected  Disposition:   Home with Family    HH/PT/OT/RN    SNF/LTC    TAYA    ________________________________________________________________________  TOTAL TIME:   Minutes    Critical Care Provided  79   Minutes non procedure based      Comments    x Reviewed previous records   >50% of visit spent in counseling and coordination of care x Discussion with patient and/or family and questions answered       ________________________________________________________________________  Signed: Francisco Foster MD    Procedures: see electronic medical records for all procedures/Xrays and details which were not copied into this note but were reviewed prior to creation of Plan. LAB DATA REVIEWED:    Recent Results (from the past 24 hour(s))   GLUCOSE, POC    Collection Time: 06/02/17  1:36 PM   Result Value Ref Range    Glucose (POC) >600 (HH) 65 - 100 mg/dL    Performed by [employer] ([facility])    EKG, 12 LEAD, INITIAL    Collection Time: 06/02/17  1:47 PM   Result Value Ref Range    Ventricular Rate 114 BPM    Atrial Rate 114 BPM    P-R Interval 152 ms    QRS Duration 94 ms    Q-T Interval 352 ms    QTC Calculation (Bezet) 485 ms    Calculated P Axis 54 degrees    Calculated R Axis -31 degrees    Calculated T Axis 14 degrees    Diagnosis       Sinus tachycardia  Possible Left atrial enlargement  Left axis deviation  Nonspecific ST and T wave abnormality  Abnormal ECG  When compared with ECG of 02-AUG-2015 15:52,  Vent.  rate has increased BY  38 BPM  Nonspecific T wave abnormality, worse in Anterior leads     GLUCOSE, POC    Collection Time: 06/02/17  2:12 PM   Result Value Ref Range    Glucose (POC) >600 (HH) 65 - 100 mg/dL    Performed by [employer] ([facility])    BLOOD GAS, ARTERIAL    Collection Time: 06/02/17  2:15 PM   Result Value Ref Range    pH 7.24 (LL) 7.35 - 7.45      PCO2 14 (L) 35.0 - 45.0 mmHg    PO2 123 (H) 80 - 100 mmHg    O2 SAT 98 (H) 92 - 97 %    BICARBONATE 6 (L) 22 - 26 mmol/L    BASE DEFICIT 18.7 mmol/L    O2 METHOD ROOM AIR      SPONTANEOUS RATE 18.0      Sample source ARTERIAL      SITE RIGHT RADIAL      SHERIF'S TEST YES      Critical value read back lEy Elizalde MD    CBC WITH AUTOMATED DIFF    Collection Time: 06/02/17  2:26 PM   Result Value Ref Range    WBC 13.4 (H) 3.6 - 11.0 K/uL    RBC 3.58 (L) 3.80 - 5.20 M/uL    HGB 11.4 (L) 11.5 - 16.0 g/dL    HCT 34.5 (L) 35.0 - 47.0 %    MCV 96.4 80.0 - 99.0 FL    MCH 31.8 26.0 - 34.0 PG    MCHC 33.0 30.0 - 36.5 g/dL    RDW 14.3 11.5 - 14.5 %    PLATELET 444 158 - 249 K/uL    NEUTROPHILS 85 (H) 32 - 75 %    LYMPHOCYTES 8 (L) 12 - 49 %    MONOCYTES 7 5 - 13 %    EOSINOPHILS 0 0 - 7 %    BASOPHILS 0 0 - 1 %    ABS. NEUTROPHILS 11.4 (H) 1.8 - 8.0 K/UL    ABS. LYMPHOCYTES 1.1 0.8 - 3.5 K/UL    ABS. MONOCYTES 0.9 0.0 - 1.0 K/UL    ABS. EOSINOPHILS 0.0 0.0 - 0.4 K/UL    ABS. BASOPHILS 0.0 0.0 - 0.1 K/UL    DF SMEAR SCANNED      RBC COMMENTS NORMOCYTIC, NORMOCHROMIC     METABOLIC PANEL, COMPREHENSIVE    Collection Time: 06/02/17  2:26 PM   Result Value Ref Range    Sodium 130 (L) 136 - 145 mmol/L    Potassium 5.8 (H) 3.5 - 5.1 mmol/L    Chloride 93 (L) 97 - 108 mmol/L    CO2 8 (LL) 21 - 32 mmol/L    Anion gap 29 (H) 5 - 15 mmol/L    Glucose 861 (HH) 65 - 100 mg/dL    BUN 47 (H) 6 - 20 MG/DL    Creatinine 2.00 (H) 0.55 - 1.02 MG/DL    BUN/Creatinine ratio 24 (H) 12 - 20      GFR est AA 29 (L) >60 ml/min/1.73m2    GFR est non-AA 24 (L) >60 ml/min/1.73m2    Calcium 9.0 8.5 - 10.1 MG/DL    Bilirubin, total 0.7 0.2 - 1.0 MG/DL    ALT (SGPT) 41 12 - 78 U/L    AST (SGOT) 27 15 - 37 U/L    Alk.  phosphatase 100 45 - 117 U/L    Protein, total 6.9 6.4 - 8.2 g/dL    Albumin 3.8 3.5 - 5.0 g/dL    Globulin 3.1 2.0 - 4.0 g/dL    A-G Ratio 1.2 1.1 - 2.2     LACTIC ACID, PLASMA    Collection Time: 06/02/17  2:26 PM   Result Value Ref Range    Lactic acid 4.5 (HH) 0.4 - 2.0 MMOL/L   MAGNESIUM    Collection Time: 06/02/17  2:26 PM   Result Value Ref Range    Magnesium 2.9 (H) 1.6 - 2.4 mg/dL   PHOSPHORUS    Collection Time: 06/02/17  2:26 PM   Result Value Ref Range    Phosphorus 8.4 (H) 2.6 - 4.7 MG/DL   GLUCOSE, POC    Collection Time: 06/02/17  4:01 PM   Result Value Ref Range    Glucose (POC) >600 (HH) 65 - 100 mg/dL    Performed by Brittney Camarillo (ED [facility])

## 2017-06-02 NOTE — ED NOTES
Pt. Incontinent of large amount of stool at this time. Pt. Cleaned and linens changed. Pt. Placed back in position of comfort with call bell in reach.

## 2017-06-02 NOTE — ED TRIAGE NOTES
Pt. Presents to ED today for complaints of hyperglycemia. PT. Frequently seen for hypoglycemia however pt. Was acting \"strange\" per family today. Upon arrival patient alert and oriented x2. Pt. Unable to answer simple questions. PT. Appears to be uncomfortable but denies pain. Pt. Faustino Cleverly on monitor x3.

## 2017-06-02 NOTE — ED NOTES
Attempted to call report to floor. Nurse with another patient and unable to take report at this time. To call back.

## 2017-06-02 NOTE — ED PROVIDER NOTES
HPI Comments: Jinny Floyd, 77 y.o. Female with PMHx significant diabetes, CHF, and HTN, presents via EMS to ED [facility] ED with cc of altered mental status, per patient's son. Per EMS, the patient has been evaluated at [facility] ED for hypoglycemia. Per EMS, the patient's BG was evaluated en route and was elevated and unregistered. The patient notes that she has eaten today, but denies taking any insulin over the past week. She notes that she has been forgetful. 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 Per medical records, the patient has an EF of 60% with regional wall motion abnormalities. She specifically denies any nausea, vomiting, diarrhea, chest pain, cough, chills, or other acute pain at this time. PCP: Conrad Art MD    Social history significant for: - Tobacco, + EtOH, - Illicit Drug Use    There are no other complaints, changes, or physical findings at this time. Written by CARLOS Parmar, as dictated by Kevin Valenzuela MD.    The history is provided by the patient, a relative and the EMS personnel. No  was used.         Past Medical History:   Diagnosis Date    Diabetes ([address]     Heart failure ([address]     unknown to family    Hypercholesteremia     Hypertension     Stroke ([address]     Thyroid disease        Past Surgical History:   Procedure Laterality Date    HX GYN      HX HEENT      thyroidectomy    HX HYSTERECTOMY      REMOVAL GALLBLADDER      THYROIDECTOMY           Family History:   Problem Relation Age of Onset    Diabetes Father     No Known Problems Mother        Social History     Social History    Marital status:      Spouse name: N/A    Number of children: 1    Years of education: N/A     Occupational History    retired      Social History Main Topics    Smoking status: Never Smoker    Smokeless tobacco: Never Used    Alcohol use Yes      Comment: been years    Drug use: No    Sexual activity: Not Currently     Other Topics Concern    Not on file     Social History Narrative         ALLERGIES: Review of patient's allergies indicates no known allergies. Review of Systems   Constitutional: Negative for activity change, appetite change, chills, fever and unexpected weight change. HENT: Negative for congestion. Eyes: Negative for pain and visual disturbance. Respiratory: Negative for cough and shortness of breath. Cardiovascular: Negative for chest pain. Gastrointestinal: Negative for abdominal pain, diarrhea, nausea and vomiting. Genitourinary: Negative for dysuria. Musculoskeletal: Negative for back pain. Skin: Negative for rash. Neurological: Negative for headaches. Psychiatric/Behavioral: Positive for confusion (Altered). Vitals:    06/02/17 1415 06/02/17 1425 06/02/17 1430 06/02/17 1445   BP: (!) 83/67 108/46 (!) 126/97 (!) 118/29   Pulse: (!) 104 (!) 105 (!) 109 (!) 109   Resp: (!) 31 26 23 (!) 32   Temp:       SpO2: 100% 100% 100% 98%   Weight:       Height:                Physical Exam   Constitutional: She is oriented to person, place, and time. She appears well-developed and well-nourished. HENT:   Head: Normocephalic and atraumatic. Mouth/Throat: Oropharynx is clear and moist.   Eyes: Conjunctivae and EOM are normal. Pupils are equal, round, and reactive to light. Right eye exhibits no discharge. Left eye exhibits no discharge. Neck: Normal range of motion. Neck supple. Cardiovascular: Normal heart sounds. No murmur heard. Borderline tachycardia   Pulmonary/Chest: Effort normal and breath sounds normal. Tachypnea noted. No respiratory distress. She has no wheezes. She has no rales. Abdominal: Soft. Bowel sounds are normal. She exhibits no distension. There is no tenderness. Musculoskeletal: Normal range of motion. Neurological: She is alert and oriented to person, place, and time. No cranial nerve deficit. She exhibits normal muscle tone. Skin: Skin is warm and dry. No rash noted. She is not diaphoretic. No peripheral edema   Nursing note and vitals reviewed. Written by CARLOS Raymond, as dictated by Olena Acharya MD.    MDM  Number of Diagnoses or Management Options  Acidemia:   Hyperglycemia:   Diagnosis management comments: Pt with hyperglycemia and tachypnea. Vital signs stable at this time. R/o infection, MI, but most likely hyperglycemia due to medication non compliance. Amount and/or Complexity of Data Reviewed  Clinical lab tests: ordered and reviewed  Tests in the medicine section of CPT®: ordered and reviewed  Obtain history from someone other than the patient: yes (Relative, EMS)  Review and summarize past medical records: yes  Discuss the patient with other providers: yes (Hospitalist)  Independent visualization of images, tracings, or specimens: yes    Critical Care  Total time providing critical care: 30-74 minutes    Patient Progress  Patient progress: stable    ED Course       Procedures    Progress Note:  2:07 PM  The patient has an elevated temperature to 100.1 F. Will order lactate, cultures, and initiate sepsis work up. Written by CARLOS Raymondibe, as dictated by Olena Acharya MD.    Progress Note:  2:21 PM  The patient's BP is currently 86/67. The patient's son is at bedside at this time. Continue IVF on pressure. Last echo with normal systolic function, although outdated. Will watch for evidence of fluid overload.    Written by CARLOS Raymondibfito, as dictated by Olena Acharya MD.    CRITICAL CARE NOTE :  2:37 PM  IMPENDING DETERIORATION -Respiratory, Cardiovascular and Metabolic  ASSOCIATED RISK FACTORS - Hypotension, Dysrhythmia and Metabolic changes  MANAGEMENT- Bedside Assessment and Supervision of Care  INTERPRETATION -  Blood Gases, ECG, Blood Pressure and Cardiac Output Measures   INTERVENTIONS - hemodynamic mngmt and Metobolic interventions  CASE REVIEW - Hospitalist, Nursing and Family  TREATMENT RESPONSE -Improved  PERFORMED BY - Physician    NOTES   :  I have spent 60 minutes of critical care time involved in lab review, consultations with specialist, family decision- making, bedside attention and documentation. During this entire length of time I was immediately available to the patient . Progress Note:  3:10 PM  The patient was re-evaluated and she is no longer hypotensive. HR improving. Son at bedside, discussed severity of her illness as well as her medication non-compliance. Will admit for metabolic acidemia and hyperglycemia, but continue to await chemistry results. Lab contacted, it appears delays due to time to the fact that blood not drawn and sent to lab for an hour. Written by CARLOS Schofield, as dictated by Trevor Blanco MD.    CONSULT NOTE:   3:24 PM  Trevor Blanco MD spoke with Chriss Sharma,   Specialty: Hospitalist  Discussed pt's hx, disposition, and available diagnostic and imaging results. Reviewed care plans. Consultant will evaluate pt for admission. Continue to await CBC, urinalysis and lactate results. Hold abx for now. Continue IVF hydration. Written by CARLOS Schofield, as dictated by Trevor Blanco MD.    Progress Note:  3:30 PM  Potassium above 5, initiating insulin drip protocol. Called the patient's PCP, and Leyla Robbins MD is aware that the patient will be admitted.   Written by CARLOS Schofield, as dictated by Trevor Blanco MD.    LABORATORY TESTS:  Recent Results (from the past 12 hour(s))   GLUCOSE, POC    Collection Time: 06/02/17  1:36 PM   Result Value Ref Range    Glucose (POC) >600 (HH) 65 - 100 mg/dL    Performed by Singh Rodriguez ([facility])    GLUCOSE, POC    Collection Time: 06/02/17  2:12 PM   Result Value Ref Range    Glucose (POC) >600 (HH) 65 - 100 mg/dL    Performed by Singh Rodriguez ([facility])    BLOOD GAS, ARTERIAL    Collection Time: 06/02/17  2:15 PM   Result Value Ref Range    pH 7.24 (LL) 7.35 - 7.45      PCO2 14 (L) 35.0 - 45.0 mmHg    PO2 123 (H) 80 - 100 mmHg    O2 SAT 98 (H) 92 - 97 %    BICARBONATE 6 (L) 22 - 26 mmol/L    BASE DEFICIT 18.7 mmol/L    O2 METHOD ROOM AIR      SPONTANEOUS RATE 18.0      Sample source ARTERIAL      SITE RIGHT RADIAL      SHERIF'S TEST YES      Critical value read back Amilcar White MD        IMAGING RESULTS:  No orders to display       MEDICATIONS GIVEN:  Medications   insulin regular (NOVOLIN R, HUMULIN R) 100 Units in 0.9% sodium chloride 100 mL infusion (not administered)   insulin lispro (HUMALOG) injection (not administered)   glucose chewable tablet 16 g (not administered)   dextrose 10% infusion 125-250 mL (not administered)   glucagon (GLUCAGEN) injection 1 mg (not administered)   sodium chloride 0.9 % bolus infusion 1,000 mL (1,000 mL IntraVENous New Bag 6/2/17 1409)   sodium chloride 0.9 % bolus infusion 1,500 mL (1,500 mL IntraVENous New Bag 6/2/17 1429)       IMPRESSION:  1. Hyperglycemia    2. Acidemia        PLAN:  1. Admit to Hospitalist.    Admit Note:  3:26 PM  Pt is being admitted by Noe Lyon MD. The results of their tests and reason(s) for their admission have been discussed with pt and/or available family. They convey agreement and understanding for the need to be admitted and for admission diagnosis. This note is prepared by Erik Chou, acting as a Scribe for Mylene Gorman MD.    Mylene Gorman MD: The scribe's documentation has been prepared under my direction and personally reviewed by me in its entirety. I confirm that the notes above accurately reflects all work, treatment, procedures, and medical decision making performed by me.

## 2017-06-02 NOTE — IP AVS SNAPSHOT
[address] [facility] 
[phone] Patient: Mariella Mcgee MRN: [id number] QRD:[id number] You are allergic to the following No active allergies Recent Documentation Height Weight BMI OB Status Smoking Status 1.6 m 79.4 kg 31 kg/m2 Hysterectomy Never Smoker Unresulted Labs Order Current Status CULTURE, BLOOD, PAIRED Preliminary result Emergency Contacts  (Rel.) Home Phone Work Phone Mobile Phone [address] [facility] (Child) [phone] -- [phone] Heber Coombs (Child) [phone] -- -- About your hospitalization You were admitted on:  June 2, 2017 You last received care in the:  [facility] MEDICAL ONCOLOGY You were discharged on:  June 6, 2017 Why you were hospitalized Your primary diagnosis was:  Not on File Your diagnoses also included:  Dka (Diabetic Ketoacidoses) ([facility]) Providers Seen During Your Hospitalizations Provider Role Specialty Primary office phone Berta Lafleur MD Attending Provider Emergency Medicine [phone] Leyla Robbins MD Attending Provider Internal Medicine [phone] Your Primary Care Physician (PCP) Primary Care Physician Office Phone Office Fax [facility] [phone] Follow-up Information Follow up With Details Comments Contact Info [place]yla Robbins MD   [address] [address] 
[phone] Current Discharge Medication List  
  
CONTINUE these medications which have CHANGED Dose & Instructions Dispensing Information Comments Morning Noon Evening Bedtime  
 insulin aspart protamine/insulin aspart 100 unit/mL (70-30) injection Commonly known as:  NovoLOG Mix 70-30 What changed:  how much to take Your last dose was: Your next dose is:    
   
   
 Dose:  36 Units 36 Units by SubCUTAneous route Daily (before breakfast). Quantity:  10 mL Refills:  0 CONTINUE these medications which have NOT CHANGED Dose & Instructions Dispensing Information Comments Morning Noon Evening Bedtime  
 amLODIPine 10 mg tablet Commonly known as:  Jam Palmer Your last dose was: Your next dose is: TAKE 1 TABLET BY MOUTH EVERY MORNING Quantity:  30 Tab Refills:  11  
     
   
   
   
  
 brimonidine 0.15 % ophthalmic solution Commonly known as:  Yusuf Weinberg Your last dose was: Your next dose is:    
   
   
  Refills:  0  
     
   
   
   
  
 clopidogrel 75 mg Tab Commonly known as:  PLAVIX Your last dose was: Your next dose is:    
   
   
 Dose:  75 mg Take 1 Tab by mouth daily. Quantity:  30 Tab Refills:  11  
     
   
   
   
  
 fenofibrate nanocrystallized 145 mg tablet Commonly known as:  [employer] Your last dose was: Your next dose is: TAKE 1 TABLET BY MOUTH EVERY DAY Quantity:  30 Tab Refills:  11 Insulin Syringe-Needle U-100 1/2 mL 30 gauge x 1/2\" Syrg Commonly known as:  BD INSULIN SYRINGE ULTRA-FINE Your last dose was: Your next dose is:    
   
   
 Use as directed daily. E11.9 Quantity:  100 Syringe Refills:  11  
     
   
   
   
  
 latanoprost 0.005 % ophthalmic solution Commonly known as:  [place]eth [place] Your last dose was: Your next dose is:    
   
   
 Dose:  1 Drop Administer 1 Drop to both eyes nightly. Refills:  0  
     
   
   
   
  
 levothyroxine 150 mcg tablet Commonly known as:  SYNTHROID Your last dose was: Your next dose is: TAKE 1 TABLET BY MOUTH EVERY DAY Quantity:  30 Tab Refills:  11  
     
   
   
   
  
 losartan-hydroCHLOROthiazide 100-25 mg per tablet Commonly known as:  HYZAAR Your last dose was: Your next dose is: TAKE 1 TABLET ONCE A DAY ORALLY Quantity:  30 Tab Refills:  11 Where to Get Your Medications These medications were sent to [address] [place] [address] Phone:  [phone]  
  insulin aspart protamine/insulin aspart 100 unit/mL (70-30) injection Discharge Instructions General Discharge Instructions Patient ID: 
Emir Calles [id number] 
78 y.o. 
1940 Patient Instructions The following personal items were collected during your admission and were returned to you. Take Home Medications What to do at [facility] Recommended diet: Diabetic Diet Recommended activity: Activity as tolerated Follow-up with Reji Pizano MD  in 4 days. Information obtained by : 
I understand that if any problems occur once I am at home I am to contact my physician. I understand and acknowledge receipt of the instructions indicated above. Physician's or R.N.'s Signature                                                                  Date/Time Patient or Representative Signature                                                          Date/Time Discharge Orders None Introducing [facility]! [employer] introduces [employer] patient portal. Now you can access parts of your medical record, email your doctor's office, and request medication refills online. 1. In your internet browser, go to [email or web address]. [email or web address] 2. Click on the First Time User? Click Here link in the Sign In box.  You will see the New Member Sign Up page. 3. Enter your [employer] Access Code exactly as it appears below. You will not need to use this code after youve completed the sign-up process. If you do not sign up before the expiration date, you must request a new code. · [employer] Access Code: [email or web address] Expires: 8/2/2017  4:23 PM 
 
4. Enter the last four digits of your Social Security Number (xxxx) and Date of Birth (mm/dd/yyyy) as indicated and click Submit. You will be taken to the next sign-up page. 5. Create a [employer] ID. This will be your [employer] login ID and cannot be changed, so think of one that is secure and easy to remember. 6. Create a [employer] password. You can change your password at any time. 7. Enter your Password Reset Question and Answer. This can be used at a later time if you forget your password. 8. Enter your e-mail address. You will receive e-mail notification when new information is available in [address] 9. Click Sign Up. You can now view and download portions of your medical record. 10. Click the Download Summary menu link to download a portable copy of your medical information. If you have questions, please visit the Frequently Asked Questions section of the [employer] website. Remember, [employer] is NOT to be used for urgent needs. For medical emergencies, dial 911. Now available from your iPhone and Android! General Information Please provide this summary of care documentation to your next provider. Patient Signature:  ____________________________________________________________ Date:  ____________________________________________________________  
  
Rudi Amrit Provider Signature:  ____________________________________________________________ Date:  ____________________________________________________________

## 2017-06-02 NOTE — ED NOTES
Spoke with Dr. Christi Bundy. When leaving patient on floor, requesting food. Dr. Christi Bundy with orders to continue to keep patient NPO.

## 2017-06-02 NOTE — ED NOTES
Pt. Assisted to bed pan at this time. Pt. With urine and stool in bed pan. Pt. Placed back in position of comfort with call bell in reach.

## 2017-06-03 LAB
ANION GAP BLD CALC-SCNC: 6 MMOL/L (ref 5–15)
ANION GAP BLD CALC-SCNC: 9 MMOL/L (ref 5–15)
ATRIAL RATE: 114 BPM
BUN SERPL-MCNC: 39 MG/DL (ref 6–20)
BUN SERPL-MCNC: 39 MG/DL (ref 6–20)
BUN/CREAT SERPL: 28 (ref 12–20)
BUN/CREAT SERPL: 30 (ref 12–20)
CALCIUM SERPL-MCNC: 7.7 MG/DL (ref 8.5–10.1)
CALCIUM SERPL-MCNC: 7.9 MG/DL (ref 8.5–10.1)
CALCULATED P AXIS, ECG09: 54 DEGREES
CALCULATED R AXIS, ECG10: -31 DEGREES
CALCULATED T AXIS, ECG11: 14 DEGREES
CHLORIDE SERPL-SCNC: 112 MMOL/L (ref 97–108)
CHLORIDE SERPL-SCNC: 113 MMOL/L (ref 97–108)
CO2 SERPL-SCNC: 24 MMOL/L (ref 21–32)
CO2 SERPL-SCNC: 26 MMOL/L (ref 21–32)
CREAT SERPL-MCNC: 1.31 MG/DL (ref 0.55–1.02)
CREAT SERPL-MCNC: 1.39 MG/DL (ref 0.55–1.02)
DIAGNOSIS, 93000: NORMAL
GLUCOSE BLD STRIP.AUTO-MCNC: 102 MG/DL (ref 65–100)
GLUCOSE BLD STRIP.AUTO-MCNC: 116 MG/DL (ref 65–100)
GLUCOSE BLD STRIP.AUTO-MCNC: 121 MG/DL (ref 65–100)
GLUCOSE BLD STRIP.AUTO-MCNC: 156 MG/DL (ref 65–100)
GLUCOSE BLD STRIP.AUTO-MCNC: 167 MG/DL (ref 65–100)
GLUCOSE BLD STRIP.AUTO-MCNC: 179 MG/DL (ref 65–100)
GLUCOSE BLD STRIP.AUTO-MCNC: 197 MG/DL (ref 65–100)
GLUCOSE BLD STRIP.AUTO-MCNC: 206 MG/DL (ref 65–100)
GLUCOSE BLD STRIP.AUTO-MCNC: 276 MG/DL (ref 65–100)
GLUCOSE BLD STRIP.AUTO-MCNC: 69 MG/DL (ref 65–100)
GLUCOSE BLD STRIP.AUTO-MCNC: 69 MG/DL (ref 65–100)
GLUCOSE BLD STRIP.AUTO-MCNC: 77 MG/DL (ref 65–100)
GLUCOSE BLD STRIP.AUTO-MCNC: 78 MG/DL (ref 65–100)
GLUCOSE BLD STRIP.AUTO-MCNC: 92 MG/DL (ref 65–100)
GLUCOSE BLD STRIP.AUTO-MCNC: 96 MG/DL (ref 65–100)
GLUCOSE BLD STRIP.AUTO-MCNC: 97 MG/DL (ref 65–100)
GLUCOSE SERPL-MCNC: 157 MG/DL (ref 65–100)
GLUCOSE SERPL-MCNC: 87 MG/DL (ref 65–100)
LACTATE SERPL-SCNC: 1.9 MMOL/L (ref 0.4–2)
MAGNESIUM SERPL-MCNC: 2.5 MG/DL (ref 1.6–2.4)
MAGNESIUM SERPL-MCNC: 2.6 MG/DL (ref 1.6–2.4)
P-R INTERVAL, ECG05: 152 MS
POTASSIUM SERPL-SCNC: 3 MMOL/L (ref 3.5–5.1)
POTASSIUM SERPL-SCNC: 3.7 MMOL/L (ref 3.5–5.1)
Q-T INTERVAL, ECG07: 352 MS
QRS DURATION, ECG06: 94 MS
QTC CALCULATION (BEZET), ECG08: 485 MS
SERVICE CMNT-IMP: ABNORMAL
SERVICE CMNT-IMP: NORMAL
SODIUM SERPL-SCNC: 144 MMOL/L (ref 136–145)
SODIUM SERPL-SCNC: 146 MMOL/L (ref 136–145)
VENTRICULAR RATE, ECG03: 114 BPM

## 2017-06-03 PROCEDURE — 36415 COLL VENOUS BLD VENIPUNCTURE: CPT | Performed by: INTERNAL MEDICINE

## 2017-06-03 PROCEDURE — 74011636637 HC RX REV CODE- 636/637: Performed by: EMERGENCY MEDICINE

## 2017-06-03 PROCEDURE — 82962 GLUCOSE BLOOD TEST: CPT

## 2017-06-03 PROCEDURE — 65270000029 HC RM PRIVATE

## 2017-06-03 PROCEDURE — 83735 ASSAY OF MAGNESIUM: CPT | Performed by: INTERNAL MEDICINE

## 2017-06-03 PROCEDURE — 74011250637 HC RX REV CODE- 250/637: Performed by: INTERNAL MEDICINE

## 2017-06-03 PROCEDURE — 80048 BASIC METABOLIC PNL TOTAL CA: CPT | Performed by: INTERNAL MEDICINE

## 2017-06-03 PROCEDURE — 74011250636 HC RX REV CODE- 250/636: Performed by: INTERNAL MEDICINE

## 2017-06-03 PROCEDURE — 74011636637 HC RX REV CODE- 636/637: Performed by: INTERNAL MEDICINE

## 2017-06-03 PROCEDURE — 74011000258 HC RX REV CODE- 258: Performed by: INTERNAL MEDICINE

## 2017-06-03 PROCEDURE — 65660000000 HC RM CCU STEPDOWN

## 2017-06-03 PROCEDURE — 74011000258 HC RX REV CODE- 258: Performed by: EMERGENCY MEDICINE

## 2017-06-03 PROCEDURE — 74011000250 HC RX REV CODE- 250: Performed by: INTERNAL MEDICINE

## 2017-06-03 RX ORDER — LOPERAMIDE HYDROCHLORIDE 2 MG/1
4 CAPSULE ORAL AS NEEDED
Status: DISCONTINUED | OUTPATIENT
Start: 2017-06-03 | End: 2017-06-06 | Stop reason: HOSPADM

## 2017-06-03 RX ORDER — POTASSIUM CHLORIDE 7.45 MG/ML
10 INJECTION INTRAVENOUS
Status: COMPLETED | OUTPATIENT
Start: 2017-06-03 | End: 2017-06-03

## 2017-06-03 RX ORDER — POTASSIUM CHLORIDE AND SODIUM CHLORIDE 450; 150 MG/100ML; MG/100ML
INJECTION, SOLUTION INTRAVENOUS CONTINUOUS
Status: DISCONTINUED | OUTPATIENT
Start: 2017-06-03 | End: 2017-06-06

## 2017-06-03 RX ORDER — DEXTROSE, SODIUM CHLORIDE, AND POTASSIUM CHLORIDE 5; .45; .15 G/100ML; G/100ML; G/100ML
125 INJECTION INTRAVENOUS CONTINUOUS
Status: DISCONTINUED | OUTPATIENT
Start: 2017-06-03 | End: 2017-06-03

## 2017-06-03 RX ORDER — INSULIN GLARGINE 100 [IU]/ML
20 INJECTION, SOLUTION SUBCUTANEOUS
Status: COMPLETED | OUTPATIENT
Start: 2017-06-03 | End: 2017-06-03

## 2017-06-03 RX ADMIN — FENOFIBRATE 145 MG: 145 TABLET ORAL at 08:27

## 2017-06-03 RX ADMIN — Medication 10 ML: at 22:58

## 2017-06-03 RX ADMIN — DEXTROSE MONOHYDRATE, SODIUM CHLORIDE, AND POTASSIUM CHLORIDE 125 ML/HR: 50; 4.5; 1.49 INJECTION, SOLUTION INTRAVENOUS at 03:42

## 2017-06-03 RX ADMIN — SODIUM CHLORIDE 1.1 UNITS/HR: 900 INJECTION, SOLUTION INTRAVENOUS at 04:47

## 2017-06-03 RX ADMIN — DEXTROSE MONOHYDRATE AND SODIUM CHLORIDE 125 ML/HR: 5; .45 INJECTION, SOLUTION INTRAVENOUS at 01:01

## 2017-06-03 RX ADMIN — POTASSIUM CHLORIDE 10 MEQ: 10 INJECTION, SOLUTION INTRAVENOUS at 06:01

## 2017-06-03 RX ADMIN — LATANOPROST 1 DROP: 50 SOLUTION OPHTHALMIC at 22:58

## 2017-06-03 RX ADMIN — SODIUM CHLORIDE AND POTASSIUM CHLORIDE: 4.5; 1.49 INJECTION, SOLUTION INTRAVENOUS at 10:19

## 2017-06-03 RX ADMIN — CLOPIDOGREL BISULFATE 75 MG: 75 TABLET, FILM COATED ORAL at 08:26

## 2017-06-03 RX ADMIN — HEPARIN SODIUM 5000 UNITS: 5000 INJECTION, SOLUTION INTRAVENOUS; SUBCUTANEOUS at 17:57

## 2017-06-03 RX ADMIN — INSULIN LISPRO 3 UNITS: 100 INJECTION, SOLUTION INTRAVENOUS; SUBCUTANEOUS at 13:08

## 2017-06-03 RX ADMIN — Medication 10 ML: at 13:09

## 2017-06-03 RX ADMIN — POTASSIUM CHLORIDE 10 MEQ: 10 INJECTION, SOLUTION INTRAVENOUS at 04:50

## 2017-06-03 RX ADMIN — SODIUM CHLORIDE AND POTASSIUM CHLORIDE: 4.5; 1.49 INJECTION, SOLUTION INTRAVENOUS at 19:23

## 2017-06-03 RX ADMIN — HEPARIN SODIUM 5000 UNITS: 5000 INJECTION, SOLUTION INTRAVENOUS; SUBCUTANEOUS at 04:48

## 2017-06-03 RX ADMIN — POTASSIUM CHLORIDE 10 MEQ: 10 INJECTION, SOLUTION INTRAVENOUS at 03:53

## 2017-06-03 RX ADMIN — INSULIN LISPRO 5 UNITS: 100 INJECTION, SOLUTION INTRAVENOUS; SUBCUTANEOUS at 17:58

## 2017-06-03 RX ADMIN — LEVOTHYROXINE SODIUM 150 MCG: 150 TABLET ORAL at 06:51

## 2017-06-03 RX ADMIN — AMLODIPINE BESYLATE 10 MG: 5 TABLET ORAL at 08:26

## 2017-06-03 RX ADMIN — POTASSIUM CHLORIDE 10 MEQ: 10 INJECTION, SOLUTION INTRAVENOUS at 06:51

## 2017-06-03 RX ADMIN — INSULIN GLARGINE 20 UNITS: 100 INJECTION, SOLUTION SUBCUTANEOUS at 09:48

## 2017-06-03 NOTE — PROGRESS NOTES
PCU SHIFT NURSING NOTE      Bedside shift change report given to Zulay Montague RN (oncoming nurse) by Erica Dumont RN (offgoing nurse). Report included the following information SBAR, Kardex, Intake/Output and Recent Results. Shift Summary: 1930: Dr. Lucrecia Almanza states he is not concerned with loose stools at this time, will hold on sending c.diff. States to call hospitalist overnight since he has not seen pt this admission. 2100: Insulin gtt low, message sent to pharmacy for refill. 2130: Insulin gtt empty, high priority message sent to pharmacy. 2215: Pharmacy message marked done but insulin gtt not in tube station. Called to pharmacy and spoke to states bucky Agudelo a few more minutes. \" Notified that pt has been off drip for 45min at this time. 80: Paged Dr. Lashawn Landrum for potassium of 3.0. Orders received for IVF to change to D51/2NS with 20mEq K and 10 mEq of potassium q1h x 4 doses. 0600: Pt's repeat K 3.7. Admission Date 6/2/2017   Admission Diagnosis DKA (diabetic ketoacidoses) [facility]  DKA (diabetic ketoacidoses) ([facility]   Consults IP CONSULT TO HOSPITALIST        Consults   []PT   []OT   []Speech   []Case Management      [] Palliative      Cardiac Monitoring Order   []Yes   []No     IV drips   []Yes    Drip:                            Dose:  Drip:                            Dose:  Drip:                            Dose:   []No     GI Prophylaxis   []Yes   []No         DVT Prophylaxis   SCDs:             Brennon stockings:         [] Medication   []Contraindicated   []None      Activity Level           Purposeful Rounding every 1-2 hour? []Yes   Buckner Score  Total Score: 3   Bed Alarm (If score 3 or >)   []Yes   [] Refused (See signed refusal form in chart)   Madhu Score      Madhu Score (if score 14 or less)   []PMT consult   []Wound Care consult      []Specialty bed   [] Nutrition consult          Needs prior to discharge:   Home O2 required:    []Yes   []No    If yes, how much O2 required?     Other: Last Bowel Movement:        Influenza Vaccine          Pneumonia Vaccine           Diet Active Orders   Diet    DIET NPO      LDAs               Peripheral IV 06/02/17 Right Wrist (Active)   Site Assessment Clean, dry, & intact 6/2/2017  2:25 PM   Phlebitis Assessment 0 6/2/2017  2:25 PM   Infiltration Assessment 0 6/2/2017  2:25 PM   Dressing Status Clean, dry, & intact 6/2/2017  2:25 PM   Dressing Type Transparent 6/2/2017  2:25 PM   Hub Color/Line Status Green;Capped;Flushed 6/2/2017  2:25 PM       Peripheral IV 06/02/17 Left Wrist (Active)   Site Assessment Clean, dry, & intact 6/2/2017  4:22 PM   Phlebitis Assessment 0 6/2/2017  4:22 PM   Infiltration Assessment 0 6/2/2017  4:22 PM   Dressing Status Clean, dry, & intact 6/2/2017  4:22 PM   Dressing Type Tape;Transparent 6/2/2017  4:22 PM   Hub Color/Line Status Pink 6/2/2017  4:22 PM                      Urinary Catheter      Intake & Output        Readmission Risk Assessment Tool Score Medium Risk            16       Total Score        3 Relationship with PCP    4 More than 1 Admission in calendar year    5 Patient Insurance is Medicare, Medicaid or Self Pay    4 Charlson Comorbidity Score        Criteria that do not apply:    Patient Living Status    Patient Length of Stay > 5       Expected Length of Stay - - -   Actual Length of Stay 0

## 2017-06-03 NOTE — PROGRESS NOTES
General Daily Progress Note    Admit Date: 6/2/2017    Subjective:     Patient has no complaint . Current Facility-Administered Medications   Medication Dose Route Frequency    insulin regular (NOVOLIN R, HUMULIN R) 100 Units in 0.9% sodium chloride 100 mL infusion  0-50 Units/hr IntraVENous TITRATE    insulin glargine (LANTUS) injection 20 Units  20 Units SubCUTAneous NOW    0.45% sodium chloride 1,000 mL with potassium chloride 20 mEq infusion   IntraVENous CONTINUOUS    [START ON 6/4/2017] insulin lispro protamine/insulin lispro (HUMALOG MIX 75/25) injection 20 Units  20 Units SubCUTAneous ACB    loperamide (IMODIUM) capsule 4 mg  4 mg Oral PRN    insulin lispro (HUMALOG) injection   SubCUTAneous TIDAC    glucose chewable tablet 16 g  4 Tab Oral PRN    dextrose 10% infusion 125-250 mL  125-250 mL IntraVENous PRN    glucagon (GLUCAGEN) injection 1 mg  1 mg IntraMUSCular PRN    amLODIPine (NORVASC) tablet 10 mg  10 mg Oral DAILY    clopidogrel (PLAVIX) tablet 75 mg  75 mg Oral DAILY    fenofibrate nanocrystallized (TRICOR) tablet 145 mg  145 mg Oral DAILY    latanoprost (XALATAN) 0.005 % ophthalmic solution 1 Drop  1 Drop Both Eyes QHS    levothyroxine (SYNTHROID) tablet 150 mcg  150 mcg Oral 7am    sodium chloride (NS) flush 5-10 mL  5-10 mL IntraVENous Q8H    sodium chloride (NS) flush 5-10 mL  5-10 mL IntraVENous PRN    acetaminophen (TYLENOL) tablet 650 mg  650 mg Oral Q4H PRN    ondansetron (ZOFRAN) injection 4 mg  4 mg IntraVENous Q4H PRN    bisacodyl (DULCOLAX) tablet 5 mg  5 mg Oral DAILY PRN    heparin (porcine) injection 5,000 Units  5,000 Units SubCUTAneous Q12H    hydrALAZINE (APRESOLINE) 20 mg/mL injection 10 mg  10 mg IntraVENous Q6H PRN    dextrose 5 % - 0.45% NaCl infusion  75 mL/hr IntraVENous CONTINUOUS        Review of Systems  A comprehensive review of systems was negative.     Objective:     Patient Vitals for the past 24 hrs:   BP Temp Pulse Resp SpO2 Height Weight 06/03/17 0715 117/40 98.8 °F (37.1 °C) 93 18 - - -   06/03/17 0319 118/40 97.7 °F (36.5 °C) 86 18 100 % - -   06/02/17 2308 122/40 98 °F (36.7 °C) 88 18 99 % - -   06/02/17 2002 (!) 134/39 97.7 °F (36.5 °C) 100 22 100 % - -   06/02/17 1840 132/43 97.8 °F (36.6 °C) 100 24 100 % - -   06/02/17 1745 (!) 116/36 - 100 28 100 % - -   06/02/17 1730 (!) 118/36 - 100 (!) 33 100 % - -   06/02/17 1715 (!) 114/38 - (!) 102 (!) 33 100 % - -   06/02/17 1700 113/46 - (!) 103 (!) 32 100 % - -   06/02/17 1645 (!) 122/38 - (!) 107 (!) 32 100 % - -   06/02/17 1630 (!) 112/35 - (!) 101 (!) 31 100 % - -   06/02/17 1615 98/47 - (!) 108 29 100 % - -   06/02/17 1600 111/42 - (!) 111 28 100 % - -   06/02/17 1545 125/42 - (!) 107 22 100 % - -   06/02/17 1539 105/84 - (!) 103 30 100 % - -   06/02/17 1500 (!) 108/29 - (!) 104 29 100 % - -   06/02/17 1445 (!) 118/29 - (!) 109 (!) 32 98 % - -   06/02/17 1430 (!) 126/97 - (!) 109 23 100 % - -   06/02/17 1425 108/46 - (!) 105 26 100 % - -   06/02/17 1415 (!) 83/67 - (!) 104 (!) 31 100 % - -   06/02/17 1400 116/44 - (!) 108 (!) 34 100 % - -   06/02/17 1345 133/47 - - - 100 % - -   06/02/17 1335 - - - - - 5' 3\" (1.6 m) 175 lb (79.4 kg)   06/02/17 1333 (!) 135/38 100.1 °F (37.8 °C) (!) 113 - 100 % - -        06/01 1901 - 06/03 0700  In: 1759.3 [I.V.:1759.3]  Out: -     Physical Exam:   Visit Vitals    /40    Pulse 93    Temp 98.8 °F (37.1 °C)    Resp 18    Ht 5' 3\" (1.6 m)    Wt 175 lb (79.4 kg)    SpO2 100%    BMI 31 kg/m2     General appearance: alert, cooperative, no distress, appears stated age  Neck: supple, symmetrical, trachea midline, no adenopathy, thyroid: not enlarged, symmetric, no tenderness/mass/nodules, no carotid bruit and no JVD  Lungs: clear to auscultation bilaterally  Heart: regular rate and rhythm, S1, S2 normal, no murmur, click, rub or gallop  Abdomen: soft, non-tender.  Bowel sounds normal. No masses,  no organomegaly  Extremities: extremities normal, atraumatic, no cyanosis or edema  Neurologic: Grossly normal        Data Review   Recent Results (from the past 24 hour(s))   GLUCOSE, POC    Collection Time: 06/02/17  1:36 PM   Result Value Ref Range    Glucose (POC) >600 (HH) 65 - 100 mg/dL    Performed by Sandra Hawk ([facility])    EKG, 12 LEAD, INITIAL    Collection Time: 06/02/17  1:47 PM   Result Value Ref Range    Ventricular Rate 114 BPM    Atrial Rate 114 BPM    P-R Interval 152 ms    QRS Duration 94 ms    Q-T Interval 352 ms    QTC Calculation (Bezet) 485 ms    Calculated P Axis 54 degrees    Calculated R Axis -31 degrees    Calculated T Axis 14 degrees    Diagnosis       Sinus tachycardia  Possible Left atrial enlargement  Left axis deviation  Nonspecific ST and T wave abnormality  Abnormal ECG  When compared with ECG of 02-AUG-2015 15:52,  Vent.  rate has increased BY  38 BPM  Nonspecific T wave abnormality, worse in Anterior leads     GLUCOSE, POC    Collection Time: 06/02/17  2:12 PM   Result Value Ref Range    Glucose (POC) >600 (HH) 65 - 100 mg/dL    Performed by Sandra Hawk ([facility])    BLOOD GAS, ARTERIAL    Collection Time: 06/02/17  2:15 PM   Result Value Ref Range    pH 7.24 (LL) 7.35 - 7.45      PCO2 14 (L) 35.0 - 45.0 mmHg    PO2 123 (H) 80 - 100 mmHg    O2 SAT 98 (H) 92 - 97 %    BICARBONATE 6 (L) 22 - 26 mmol/L    BASE DEFICIT 18.7 mmol/L    O2 METHOD ROOM AIR      SPONTANEOUS RATE 18.0      Sample source ARTERIAL      SITE RIGHT RADIAL      SHERIF'S TEST YES      Critical value read back Jesus Yoon MD    CBC WITH AUTOMATED DIFF    Collection Time: 06/02/17  2:26 PM   Result Value Ref Range    WBC 13.4 (H) 3.6 - 11.0 K/uL    RBC 3.58 (L) 3.80 - 5.20 M/uL    HGB 11.4 (L) 11.5 - 16.0 g/dL    HCT 34.5 (L) 35.0 - 47.0 %    MCV 96.4 80.0 - 99.0 FL    MCH 31.8 26.0 - 34.0 PG    MCHC 33.0 30.0 - 36.5 g/dL    RDW 14.3 11.5 - 14.5 %    PLATELET 123 920 - 806 K/uL    NEUTROPHILS 85 (H) 32 - 75 %    LYMPHOCYTES 8 (L) 12 - 49 % MONOCYTES 7 5 - 13 %    EOSINOPHILS 0 0 - 7 %    BASOPHILS 0 0 - 1 %    ABS. NEUTROPHILS 11.4 (H) 1.8 - 8.0 K/UL    ABS. LYMPHOCYTES 1.1 0.8 - 3.5 K/UL    ABS. MONOCYTES 0.9 0.0 - 1.0 K/UL    ABS. EOSINOPHILS 0.0 0.0 - 0.4 K/UL    ABS. BASOPHILS 0.0 0.0 - 0.1 K/UL    DF SMEAR SCANNED      RBC COMMENTS NORMOCYTIC, NORMOCHROMIC     METABOLIC PANEL, COMPREHENSIVE    Collection Time: 06/02/17  2:26 PM   Result Value Ref Range    Sodium 130 (L) 136 - 145 mmol/L    Potassium 5.8 (H) 3.5 - 5.1 mmol/L    Chloride 93 (L) 97 - 108 mmol/L    CO2 8 (LL) 21 - 32 mmol/L    Anion gap 29 (H) 5 - 15 mmol/L    Glucose 861 (HH) 65 - 100 mg/dL    BUN 47 (H) 6 - 20 MG/DL    Creatinine 2.00 (H) 0.55 - 1.02 MG/DL    BUN/Creatinine ratio 24 (H) 12 - 20      GFR est AA 29 (L) >60 ml/min/1.73m2    GFR est non-AA 24 (L) >60 ml/min/1.73m2    Calcium 9.0 8.5 - 10.1 MG/DL    Bilirubin, total 0.7 0.2 - 1.0 MG/DL    ALT (SGPT) 41 12 - 78 U/L    AST (SGOT) 27 15 - 37 U/L    Alk.  phosphatase 100 45 - 117 U/L    Protein, total 6.9 6.4 - 8.2 g/dL    Albumin 3.8 3.5 - 5.0 g/dL    Globulin 3.1 2.0 - 4.0 g/dL    A-G Ratio 1.2 1.1 - 2.2     LACTIC ACID, PLASMA    Collection Time: 06/02/17  2:26 PM   Result Value Ref Range    Lactic acid 4.5 (HH) 0.4 - 2.0 MMOL/L   MAGNESIUM    Collection Time: 06/02/17  2:26 PM   Result Value Ref Range    Magnesium 2.9 (H) 1.6 - 2.4 mg/dL   PHOSPHORUS    Collection Time: 06/02/17  2:26 PM   Result Value Ref Range    Phosphorus 8.4 (H) 2.6 - 4.7 MG/DL   HEMOGLOBIN A1C WITH EAG    Collection Time: 06/02/17  2:26 PM   Result Value Ref Range    Hemoglobin A1c 9.2 (H) 4.2 - 6.3 %    Est. average glucose 217 mg/dL   TROPONIN I    Collection Time: 06/02/17  2:26 PM   Result Value Ref Range    Troponin-I, Qt. <0.04 <0.05 ng/mL   URINALYSIS W/ REFLEX CULTURE    Collection Time: 06/02/17  3:50 PM   Result Value Ref Range    Color YELLOW/STRAW      Appearance CLOUDY (A) CLEAR      Specific gravity 1.022 1.003 - 1.030      pH (UA) 5.0 5.0 - 8.0      Protein NEGATIVE  NEG mg/dL    Glucose >1000 (A) NEG mg/dL    Ketone 40 (A) NEG mg/dL    Bilirubin NEGATIVE  NEG      Blood NEGATIVE  NEG      Urobilinogen 0.2 0.2 - 1.0 EU/dL    Nitrites NEGATIVE  NEG      Leukocyte Esterase NEGATIVE  NEG      UA:UC IF INDICATED CULTURE NOT INDICATED BY UA RESULT CNI      WBC 0-4 0 - 4 /hpf    RBC 0-5 0 - 5 /hpf    Epithelial cells FEW FEW /lpf    Bacteria NEGATIVE  NEG /hpf    Hyaline cast 2-5 0 - 5 /lpf   GLUCOSE, POC    Collection Time: 06/02/17  4:01 PM   Result Value Ref Range    Glucose (POC) >600 (HH) 65 - 100 mg/dL    Performed by Yair Rosado ([facility])    GLUCOSE, POC    Collection Time: 06/02/17  5:19 PM   Result Value Ref Range    Glucose (POC) >600 (HH) 65 - 100 mg/dL    Performed by Yair Rosado ([facility])    METABOLIC PANEL, BASIC    Collection Time: 06/02/17  6:02 PM   Result Value Ref Range    Sodium 138 136 - 145 mmol/L    Potassium 4.3 3.5 - 5.1 mmol/L    Chloride 105 97 - 108 mmol/L    CO2 7 (LL) 21 - 32 mmol/L    Anion gap 26 (H) 5 - 15 mmol/L    Glucose 576 (H) 65 - 100 mg/dL    BUN 44 (H) 6 - 20 MG/DL    Creatinine 1.88 (H) 0.55 - 1.02 MG/DL    BUN/Creatinine ratio 23 (H) 12 - 20      GFR est AA 32 (L) >60 ml/min/1.73m2    GFR est non-AA 26 (L) >60 ml/min/1.73m2    Calcium 8.1 (L) 8.5 - 10.1 MG/DL   MAGNESIUM    Collection Time: 06/02/17  6:02 PM   Result Value Ref Range    Magnesium 2.8 (H) 1.6 - 2.4 mg/dL   PHOSPHORUS    Collection Time: 06/02/17  6:02 PM   Result Value Ref Range    Phosphorus 5.3 (H) 2.6 - 4.7 MG/DL   CBC WITH AUTOMATED DIFF    Collection Time: 06/02/17  6:02 PM   Result Value Ref Range    WBC 16.9 (H) 3.6 - 11.0 K/uL    RBC 3.25 (L) 3.80 - 5.20 M/uL    HGB 10.3 (L) 11.5 - 16.0 g/dL    HCT 31.0 (L) 35.0 - 47.0 %    MCV 95.4 80.0 - 99.0 FL    MCH 31.7 26.0 - 34.0 PG    MCHC 33.2 30.0 - 36.5 g/dL    RDW 14.2 11.5 - 14.5 %    PLATELET 587 270 - 053 K/uL    NEUTROPHILS 88 (H) 32 - 75 %    LYMPHOCYTES 8 (L) 12 - 49 % MONOCYTES 4 (L) 5 - 13 %    EOSINOPHILS 0 0 - 7 %    BASOPHILS 0 0 - 1 %    ABS. NEUTROPHILS 14.9 (H) 1.8 - 8.0 K/UL    ABS. LYMPHOCYTES 1.3 0.8 - 3.5 K/UL    ABS. MONOCYTES 0.6 0.0 - 1.0 K/UL    ABS. EOSINOPHILS 0.0 0.0 - 0.4 K/UL    ABS.  BASOPHILS 0.0 0.0 - 0.1 K/UL   LACTIC ACID, PLASMA    Collection Time: 06/02/17  6:03 PM   Result Value Ref Range    Lactic acid 5.3 (HH) 0.4 - 2.0 MMOL/L   GLUCOSE, POC    Collection Time: 06/02/17  6:22 PM   Result Value Ref Range    Glucose (POC) 532 (H) 65 - 100 mg/dL    Performed by David Meraz ([facility])    GLUCOSE, POC    Collection Time: 06/02/17  7:50 PM   Result Value Ref Range    Glucose (POC) 415 (H) 65 - 100 mg/dL    Performed by Deyanira Cain    GLUCOSE, POC    Collection Time: 06/02/17  8:57 PM   Result Value Ref Range    Glucose (POC) 337 (H) 65 - 100 mg/dL    Performed by [address] [place] [address], [facility]    Collection Time: 06/02/17 10:27 PM   Result Value Ref Range    Sodium 145 136 - 145 mmol/L    Potassium 3.4 (L) 3.5 - 5.1 mmol/L    Chloride 113 (H) 97 - 108 mmol/L    CO2 18 (L) 21 - 32 mmol/L    Anion gap 14 5 - 15 mmol/L    Glucose 262 (H) 65 - 100 mg/dL    BUN 43 (H) 6 - 20 MG/DL    Creatinine 1.63 (H) 0.55 - 1.02 MG/DL    BUN/Creatinine ratio 26 (H) 12 - 20      GFR est AA 37 (L) >60 ml/min/1.73m2    GFR est non-AA 31 (L) >60 ml/min/1.73m2    Calcium 8.0 (L) 8.5 - 10.1 MG/DL   MAGNESIUM    Collection Time: 06/02/17 10:27 PM   Result Value Ref Range    Magnesium 2.7 (H) 1.6 - 2.4 mg/dL   GLUCOSE, POC    Collection Time: 06/02/17 10:32 PM   Result Value Ref Range    Glucose (POC) 225 (H) 65 - 100 mg/dL    Performed by Buzz Littlejohn, POC    Collection Time: 06/02/17 11:39 PM   Result Value Ref Range    Glucose (POC) 199 (H) 65 - 100 mg/dL    Performed by Merry Menendez    LACTIC ACID, PLASMA    Collection Time: 06/02/17 11:59 PM   Result Value Ref Range    Lactic acid 1.9 0.4 - 2.0 MMOL/L   GLUCOSE, POC    Collection Time: 06/03/17 12:55 AM   Result Value Ref Range    Glucose (POC) 167 (H) 65 - 100 mg/dL    Performed by [facility]    GLUCOSE, POC    Collection Time: 06/03/17  2:18 AM   Result Value Ref Range    Glucose (POC) 156 (H) 65 - 100 mg/dL    Performed by [address] [place] [address], BASIC    Collection Time: 06/03/17  2:25 AM   Result Value Ref Range    Sodium 146 (H) 136 - 145 mmol/L    Potassium 3.0 (L) 3.5 - 5.1 mmol/L    Chloride 113 (H) 97 - 108 mmol/L    CO2 24 21 - 32 mmol/L    Anion gap 9 5 - 15 mmol/L    Glucose 157 (H) 65 - 100 mg/dL    BUN 39 (H) 6 - 20 MG/DL    Creatinine 1.39 (H) 0.55 - 1.02 MG/DL    BUN/Creatinine ratio 28 (H) 12 - 20      GFR est AA 45 (L) >60 ml/min/1.73m2    GFR est non-AA 37 (L) >60 ml/min/1.73m2    Calcium 7.9 (L) 8.5 - 10.1 MG/DL   MAGNESIUM    Collection Time: 06/03/17  2:25 AM   Result Value Ref Range    Magnesium 2.6 (H) 1.6 - 2.4 mg/dL   GLUCOSE, POC    Collection Time: 06/03/17  3:37 AM   Result Value Ref Range    Glucose (POC) 121 (H) 65 - 100 mg/dL    Performed by [facility]    GLUCOSE, POC    Collection Time: 06/03/17  4:46 AM   Result Value Ref Range    Glucose (POC) 97 65 - 100 mg/dL    Performed by [address] [place] [address], BASIC    Collection Time: 06/03/17  6:04 AM   Result Value Ref Range    Sodium 144 136 - 145 mmol/L    Potassium 3.7 3.5 - 5.1 mmol/L    Chloride 112 (H) 97 - 108 mmol/L    CO2 26 21 - 32 mmol/L    Anion gap 6 5 - 15 mmol/L    Glucose 87 65 - 100 mg/dL    BUN 39 (H) 6 - 20 MG/DL    Creatinine 1.31 (H) 0.55 - 1.02 MG/DL    BUN/Creatinine ratio 30 (H) 12 - 20      GFR est AA 48 (L) >60 ml/min/1.73m2    GFR est non-AA 40 (L) >60 ml/min/1.73m2    Calcium 7.7 (L) 8.5 - 10.1 MG/DL   MAGNESIUM    Collection Time: 06/03/17  6:04 AM   Result Value Ref Range    Magnesium 2.5 (H) 1.6 - 2.4 mg/dL   GLUCOSE, POC    Collection Time: 06/03/17  6:06 AM   Result Value Ref Range    Glucose (POC) 92 65 - 100 mg/dL    Performed by Heri Sauer, POC    Collection Time: 06/03/17  7:09 AM   Result Value Ref Range    Glucose (POC) 102 (H) 65 - 100 mg/dL    Performed by Boris Gupta POC    Collection Time: 06/03/17  8:19 AM   Result Value Ref Range    Glucose (POC) 116 (H) 65 - 100 mg/dL    Performed by Rubi Arechiga            Assessment:     Active Problems:    DKA (diabetic ketoacidoses) ([address] (6/2/2017)        Plan: 1. Pt dev a viral gastroenteritis and progressive dehydration culminating in DKA. She has been taking her insulin. DKA has resolved and now transitioning to maintenance program.  2.She remains dehydrated. 3.No sign of cardiac decomp.

## 2017-06-03 NOTE — PROGRESS NOTES
Dr Detra Cooks informed that Dr Alana Garcia wanted to run sodium and acetate together to total 175 cc/hr. He wanted the Acetate to run at 50 cc/hr and the sodium at 125/hr.

## 2017-06-04 LAB
ANION GAP BLD CALC-SCNC: 7 MMOL/L (ref 5–15)
BASOPHILS # BLD AUTO: 0 K/UL
BASOPHILS # BLD: 0 %
BLASTS NFR BLD: 0 %
BUN SERPL-MCNC: 29 MG/DL (ref 6–20)
BUN/CREAT SERPL: 30 (ref 12–20)
CALCIUM SERPL-MCNC: 7.6 MG/DL (ref 8.5–10.1)
CHLORIDE SERPL-SCNC: 106 MMOL/L (ref 97–108)
CO2 SERPL-SCNC: 22 MMOL/L (ref 21–32)
CREAT SERPL-MCNC: 0.98 MG/DL (ref 0.55–1.02)
DIFFERENTIAL METHOD BLD: ABNORMAL
EOSINOPHIL # BLD: 0.2 K/UL
EOSINOPHIL NFR BLD: 2 %
ERYTHROCYTE [DISTWIDTH] IN BLOOD BY AUTOMATED COUNT: 14.5 % (ref 11.5–14.5)
GLUCOSE BLD STRIP.AUTO-MCNC: 127 MG/DL (ref 65–100)
GLUCOSE BLD STRIP.AUTO-MCNC: 212 MG/DL (ref 65–100)
GLUCOSE BLD STRIP.AUTO-MCNC: 253 MG/DL (ref 65–100)
GLUCOSE BLD STRIP.AUTO-MCNC: 85 MG/DL (ref 65–100)
GLUCOSE SERPL-MCNC: 117 MG/DL (ref 65–100)
HCT VFR BLD AUTO: 28.9 % (ref 35–47)
HGB BLD-MCNC: 10.2 G/DL (ref 11.5–16)
LYMPHOCYTES # BLD AUTO: 17 %
LYMPHOCYTES # BLD: 1.9 K/UL
MANUAL DIFFERENTIAL PERFORMED BLD QL: ABNORMAL
MCH RBC QN AUTO: 32.5 PG (ref 26–34)
MCHC RBC AUTO-ENTMCNC: 35.3 G/DL (ref 30–36.5)
MCV RBC AUTO: 92 FL (ref 80–99)
METAMYELOCYTES NFR BLD MANUAL: 0 %
MONOCYTES # BLD: 1.3 K/UL
MONOCYTES NFR BLD AUTO: 11 %
MYELOCYTES NFR BLD MANUAL: 0 %
NEUTS BAND NFR BLD MANUAL: 0 %
NEUTS SEG # BLD: 8 K/UL
NEUTS SEG NFR BLD AUTO: 70 %
NRBC # BLD: 0 K/UL (ref 0–0.01)
NRBC BLD-RTO: 0 PER 100 WBC
PLATELET # BLD AUTO: 214 K/UL (ref 150–400)
POTASSIUM SERPL-SCNC: 3.7 MMOL/L (ref 3.5–5.1)
PROMYELOCYTES NFR BLD MANUAL: 0 %
RBC # BLD AUTO: 3.14 M/UL (ref 3.8–5.2)
RBC MORPH BLD: ABNORMAL
SERVICE CMNT-IMP: ABNORMAL
SERVICE CMNT-IMP: NORMAL
SODIUM SERPL-SCNC: 135 MMOL/L (ref 136–145)
WBC # BLD AUTO: 11.4 K/UL (ref 3.6–11)
WBC OTHER # BLD MANUAL: 0 10*3/UL

## 2017-06-04 PROCEDURE — 82962 GLUCOSE BLOOD TEST: CPT

## 2017-06-04 PROCEDURE — 36415 COLL VENOUS BLD VENIPUNCTURE: CPT | Performed by: INTERNAL MEDICINE

## 2017-06-04 PROCEDURE — 74011636637 HC RX REV CODE- 636/637: Performed by: EMERGENCY MEDICINE

## 2017-06-04 PROCEDURE — 65270000029 HC RM PRIVATE

## 2017-06-04 PROCEDURE — 85027 COMPLETE CBC AUTOMATED: CPT | Performed by: INTERNAL MEDICINE

## 2017-06-04 PROCEDURE — 65660000000 HC RM CCU STEPDOWN

## 2017-06-04 PROCEDURE — 74011250636 HC RX REV CODE- 250/636: Performed by: INTERNAL MEDICINE

## 2017-06-04 PROCEDURE — 74011636637 HC RX REV CODE- 636/637: Performed by: INTERNAL MEDICINE

## 2017-06-04 PROCEDURE — 74011250637 HC RX REV CODE- 250/637: Performed by: INTERNAL MEDICINE

## 2017-06-04 PROCEDURE — 80048 BASIC METABOLIC PNL TOTAL CA: CPT | Performed by: INTERNAL MEDICINE

## 2017-06-04 RX ADMIN — Medication 10 ML: at 13:09

## 2017-06-04 RX ADMIN — Medication 10 ML: at 21:03

## 2017-06-04 RX ADMIN — HEPARIN SODIUM 5000 UNITS: 5000 INJECTION, SOLUTION INTRAVENOUS; SUBCUTANEOUS at 17:22

## 2017-06-04 RX ADMIN — INSULIN LISPRO 16 UNITS: 100 INJECTION, SUSPENSION SUBCUTANEOUS at 08:04

## 2017-06-04 RX ADMIN — SODIUM CHLORIDE AND POTASSIUM CHLORIDE: 4.5; 1.49 INJECTION, SOLUTION INTRAVENOUS at 04:12

## 2017-06-04 RX ADMIN — FENOFIBRATE 145 MG: 145 TABLET ORAL at 08:05

## 2017-06-04 RX ADMIN — INSULIN LISPRO 2 UNITS: 100 INJECTION, SOLUTION INTRAVENOUS; SUBCUTANEOUS at 08:04

## 2017-06-04 RX ADMIN — CLOPIDOGREL BISULFATE 75 MG: 75 TABLET, FILM COATED ORAL at 08:05

## 2017-06-04 RX ADMIN — SODIUM CHLORIDE AND POTASSIUM CHLORIDE: 4.5; 1.49 INJECTION, SOLUTION INTRAVENOUS at 13:08

## 2017-06-04 RX ADMIN — INSULIN LISPRO 5 UNITS: 100 INJECTION, SOLUTION INTRAVENOUS; SUBCUTANEOUS at 12:48

## 2017-06-04 RX ADMIN — SODIUM CHLORIDE AND POTASSIUM CHLORIDE: 4.5; 1.49 INJECTION, SOLUTION INTRAVENOUS at 21:02

## 2017-06-04 RX ADMIN — LATANOPROST 1 DROP: 50 SOLUTION OPHTHALMIC at 21:02

## 2017-06-04 RX ADMIN — LEVOTHYROXINE SODIUM 150 MCG: 150 TABLET ORAL at 07:56

## 2017-06-04 RX ADMIN — AMLODIPINE BESYLATE 10 MG: 5 TABLET ORAL at 08:05

## 2017-06-04 RX ADMIN — HEPARIN SODIUM 5000 UNITS: 5000 INJECTION, SOLUTION INTRAVENOUS; SUBCUTANEOUS at 04:12

## 2017-06-04 NOTE — PROGRESS NOTES
..    PCU SHIFT NURSING NOTE      Bedside shift change report given to  Pallavi Lyn (oncoming nurse) by  Jason Gallo (offgoing nurse). Report included the following information SBAR. Shift Summary:        Admission Date 6/2/2017   Admission Diagnosis DKA (diabetic ketoacidoses) ([facility]  DKA (diabetic ketoacidoses) ([facility]   Consults IP CONSULT TO HOSPITALIST        Consults   []PT   []OT   []Speech   []Case Management      [] Palliative      Cardiac Monitoring Order   [x]Yes   []No     IV drips   []Yes    Drip:                            Dose:  Drip:                            Dose:  Drip:                            Dose:   []No     GI Prophylaxis   []Yes   []No         DVT Prophylaxis   SCDs:             Brennon stockings:         [x] Medication   []Contraindicated   []None      Activity Level Activity Level: Up with Assistance     Activity Assistance: Partial (one person)   Purposeful Rounding every 1-2 hour? [x]Yes   Buckner Score  Total Score: 3   Bed Alarm (If score 3 or >)   [x]Yes   [] Refused (See signed refusal form in chart)   Madhu Score  Madhu Score: 21   Madhu Score (if score 14 or less)   []PMT consult   []Wound Care consult      []Specialty bed   [] Nutrition consult          Needs prior to discharge:   Home O2 required:    []Yes   [x]No    If yes, how much O2 required?     Other:    Last Bowel Movement: Last Bowel Movement Date: 06/04/17      Influenza Vaccine Received Flu Vaccine for Current Season (usually Sept-March): Not Flu Season        Pneumonia Vaccine           Diet Active Orders   Diet    DIET DIABETIC CONSISTENT CARB Regular      LDAs               Peripheral IV 06/02/17 Right Wrist (Active)   Site Assessment Clean, dry, & intact 6/4/2017  2:44 PM   Phlebitis Assessment 0 6/4/2017  2:44 PM   Infiltration Assessment 0 6/4/2017  2:44 PM   Dressing Status Clean, dry, & intact 6/4/2017  2:44 PM   Dressing Type Transparent 6/4/2017  2:44 PM   Hub Color/Line Status Green 6/4/2017  2:44 PM       Peripheral IV 06/02/17 Left Wrist (Active)   Site Assessment Clean, dry, & intact 6/4/2017  2:44 PM   Phlebitis Assessment 0 6/4/2017  2:44 PM   Infiltration Assessment 0 6/4/2017  2:44 PM   Dressing Status Clean, dry, & intact 6/4/2017  2:44 PM   Dressing Type Transparent 6/4/2017  2:44 PM   Hub Color/Line Status Pink 6/4/2017  2:44 PM                      Urinary Catheter      Intake & Output   Date 06/03/17 0700 - 06/04/17 0659 06/04/17 0700 - 06/05/17 0659   Shift 9950-2272 1422-1570 24 Hour Total 9062-3745 8128-2459 24 Hour Total   I  N  T  A  K  E   P.O. 480  480 240  240      P. O. 480  480 240  240    I.V.  (mL/kg/hr)  2505.4  (2.6) 2505.4  (1.3) 1316.7  1316.7      Insulin Volume  17.9 17.9         Volume (dextrose 5 % - 0.45% NaCl infusion)  252.1 252.1         Volume (0.45% sodium chloride with KCl 20 mEq/L infusion)  2235.4 2235.4 1316.7  1316.7    Shift Total  (mL/kg) 480  (6) 2505.4  (31.6) 2985.4  (37.6) 1556.7  (19.6)  1556.7  (19.6)   O  U  T  P  U  T   Urine  (mL/kg/hr) 500  (0.5)  500  (0.3)         Urine Voided 500  500         Urine Occurrence(s)    1 x  1 x    Stool            Stool Occurrence(s) 1 x  1 x 1 x  1 x    Shift Total  (mL/kg) 500  (6.3)  500  (6.3)      NET -20 2505.4 2485. 4 1556.7  1556.7   Weight (kg) 79.4 79.4 79.4 79.4 79.4 79.4         Readmission Risk Assessment Tool Score Medium Risk            16       Total Score        3 Relationship with PCP    4 More than 1 Admission in calendar year    5 Patient Insurance is Medicare, Medicaid or Self Pay    4 Charlson Comorbidity Score        Criteria that do not apply:    Patient Living Status    Patient Length of Stay > 5       Expected Length of Stay - - -   Actual Length of Stay 2

## 2017-06-04 NOTE — PROGRESS NOTES
PCU SHIFT NURSING NOTE      Bedside shift change report given to Violetta Valdez RN (oncoming nurse) by Fabian Hua RN (offgoing nurse). Report included the following information SBAR, Kardex, Intake/Output and Recent Results. Shift Summary:   2000: Pt resting in bed after finishing dinner. No complaints at this time. IVF infusing into R wrist.    2257: BG 69, recheck 78. Pt requested regular ginger ale. Will recheck after treatment. 2315: Recheck: 69. Pt sipping ginger ale and requested for pizza to be heated up to eat. Will recheck. 2330: Recheck 77. Pt still eating at this time. 2343: Recheck 96. Admission Date 6/2/2017   Admission Diagnosis DKA (diabetic ketoacidoses) ([facility] [address]  DKA (diabetic ketoacidoses) ([facility] [facility]   Consults IP CONSULT TO HOSPITALIST        Consults   []PT   []OT   []Speech   []Case Management      [] Palliative      Cardiac Monitoring Order   []Yes   []No     IV drips   []Yes    Drip:                            Dose:  Drip:                            Dose:  Drip:                            Dose:   []No     GI Prophylaxis   []Yes   []No         DVT Prophylaxis   SCDs:             Brennon stockings:         [] Medication   []Contraindicated   []None      Activity Level Activity Level: Up with Assistance     Activity Assistance: Partial (one person)   Purposeful Rounding every 1-2 hour? []Yes   Buckner Score  Total Score: 3   Bed Alarm (If score 3 or >)   []Yes   [] Refused (See signed refusal form in chart)   Madhu Score  Madhu Score: 22   Madhu Score (if score 14 or less)   []PMT consult   []Wound Care consult      []Specialty bed   [] Nutrition consult          Needs prior to discharge:   Home O2 required:    []Yes   []No    If yes, how much O2 required?     Other:    Last Bowel Movement: Last Bowel Movement Date: 06/02/17      Influenza Vaccine Received Flu Vaccine for Current Season (usually Sept-March): Not Flu Season        Pneumonia Vaccine           Diet Active Orders   Diet    DIET DIABETIC CONSISTENT CARB Regular      LDAs               Peripheral IV 06/02/17 Right Wrist (Active)   Site Assessment Clean, dry, & intact 6/3/2017  3:34 PM   Phlebitis Assessment 0 6/3/2017  3:34 PM   Infiltration Assessment 0 6/3/2017  3:34 PM   Dressing Status Clean, dry, & intact 6/3/2017  3:34 PM   Dressing Type Transparent 6/3/2017  3:34 PM   Hub Color/Line Status Infusing 6/3/2017  3:19 AM       Peripheral IV 06/02/17 Left Wrist (Active)   Site Assessment Clean, dry, & intact 6/3/2017  3:34 PM   Phlebitis Assessment 0 6/3/2017  3:34 PM   Infiltration Assessment 0 6/3/2017  3:34 PM   Dressing Status Clean, dry, & intact 6/3/2017  3:34 PM   Dressing Type Transparent 6/3/2017  3:34 PM   Hub Color/Line Status Infusing 6/3/2017  3:19 AM                      Urinary Catheter      Intake & Output   Date 06/02/17 1900 - 06/03/17 0659 06/03/17 0700 - 06/04/17 0659   Shift 4783-6430 24 Hour Total 6677-0657 4156-4402 24 Hour Total   I  N  T  A  K  E   P. O. 0 0 480  480      P. O. 0 0 480  480    I.V.  (mL/kg/hr) 1759. 3 1759.3         Insulin Volume 123.8 123.8         Volume (dextrose 10% infusion 125-250 mL) 0 0         Volume (0.9% sodium chloride infusion) 860.4 860.4         Volume (sodium acetate 100 mEq in sterile water 1,000 mL infusion) 329.2 329.2         Volume (dextrose 5% and 0.9% NaCl infusion) 158. 3 158. 3         Volume (dextrose 5 % - 0.45% NaCl infusion) 287.5 287.5         Volume (dextrose 5% - 0.45% NaCl with KCl 20 mEq/L infusion) 0 0         Volume (potassium chloride 10 mEq in 100 ml IVPB) 0 0       Shift Total  (mL/kg) 1759.3  (22.2) 1759.3  (22.2) 480  (6)  480  (6)   O  U  T  P  U  T   Urine  (mL/kg/hr)   500  (0.5)  500      Urine Voided   500  500    Stool           Stool Occurrence(s) 2 x 2 x 1 x  1 x    Shift Total  (mL/kg)   500  (6.3)  500  (6.3)   NET 1759. 3 1759.3 -20  -20   Weight (kg) 79.4 79.4 79.4 79.4 79.4         Readmission Risk Assessment Tool Score Medium Risk            16 Total Score        3 Relationship with PCP    4 More than 1 Admission in calendar year    5 Patient Insurance is Medicare, Medicaid or Self Pay    4 Charlson Comorbidity Score        Criteria that do not apply:    Patient Living Status    Patient Length of Stay > 5       Expected Length of Stay - - -   Actual Length of Stay 1

## 2017-06-04 NOTE — PROGRESS NOTES
General Daily Progress Note    Admit Date: 6/2/2017    Subjective:     Patient has no complaint . Current Facility-Administered Medications   Medication Dose Route Frequency    insulin lispro protamine/insulin lispro (HUMALOG MIX 75/25) injection 16 Units  16 Units SubCUTAneous ACB    insulin regular (NOVOLIN R, HUMULIN R) 100 Units in 0.9% sodium chloride 100 mL infusion  0-50 Units/hr IntraVENous TITRATE    loperamide (IMODIUM) capsule 4 mg  4 mg Oral PRN    0.45% sodium chloride with KCl 20 mEq/L infusion   IntraVENous CONTINUOUS    insulin lispro (HUMALOG) injection   SubCUTAneous TIDAC    glucose chewable tablet 16 g  4 Tab Oral PRN    dextrose 10% infusion 125-250 mL  125-250 mL IntraVENous PRN    glucagon (GLUCAGEN) injection 1 mg  1 mg IntraMUSCular PRN    amLODIPine (NORVASC) tablet 10 mg  10 mg Oral DAILY    clopidogrel (PLAVIX) tablet 75 mg  75 mg Oral DAILY    fenofibrate nanocrystallized (TRICOR) tablet 145 mg  145 mg Oral DAILY    latanoprost (XALATAN) 0.005 % ophthalmic solution 1 Drop  1 Drop Both Eyes QHS    levothyroxine (SYNTHROID) tablet 150 mcg  150 mcg Oral 7am    sodium chloride (NS) flush 5-10 mL  5-10 mL IntraVENous Q8H    sodium chloride (NS) flush 5-10 mL  5-10 mL IntraVENous PRN    acetaminophen (TYLENOL) tablet 650 mg  650 mg Oral Q4H PRN    ondansetron (ZOFRAN) injection 4 mg  4 mg IntraVENous Q4H PRN    bisacodyl (DULCOLAX) tablet 5 mg  5 mg Oral DAILY PRN    heparin (porcine) injection 5,000 Units  5,000 Units SubCUTAneous Q12H        Review of Systems  A comprehensive review of systems was negative.     Objective:     Patient Vitals for the past 24 hrs:   BP Temp Pulse Resp SpO2   06/04/17 0412 128/52 98.2 °F (36.8 °C) 83 16 99 %   06/03/17 2252 141/75 97.8 °F (36.6 °C) 83 18 98 %   06/03/17 1922 (!) 115/39 98.7 °F (37.1 °C) 88 18 100 %   06/03/17 1553 117/49 98.8 °F (37.1 °C) 79 18 99 %   06/03/17 1126 114/42 98.5 °F (36.9 °C) 89 18 100 %        06/02 1901 - 06/04 0700  In: 4744.6 [P.O.:480; I.V.:4264.6]  Out: 500 [Urine:500]    Physical Exam:   Visit Vitals    /52 (BP 1 Location: Left arm, BP Patient Position: At rest)    Pulse 83    Temp 98.2 °F (36.8 °C)    Resp 16    Ht 5' 3\" (1.6 m)    Wt 175 lb (79.4 kg)    SpO2 99%    BMI 31 kg/m2     General appearance: alert, cooperative, no distress, appears stated age  Neck: supple, symmetrical, trachea midline, no adenopathy, thyroid: not enlarged, symmetric, no tenderness/mass/nodules, no carotid bruit and no JVD  Lungs: clear to auscultation bilaterally  Heart: regular rate and rhythm, S1, S2 normal, no murmur, click, rub or gallop  Abdomen: soft, non-tender.  Bowel sounds normal. No masses,  no organomegaly  Extremities: extremities normal, atraumatic, no cyanosis or edema        Data Review   Recent Results (from the past 24 hour(s))   GLUCOSE, POC    Collection Time: 06/03/17  8:19 AM   Result Value Ref Range    Glucose (POC) 116 (H) 65 - 100 mg/dL    Performed by [id number], POC    Collection Time: 06/03/17  9:27 AM   Result Value Ref Range    Glucose (POC) 179 (H) 65 - 100 mg/dL    Performed by [employer], POC    Collection Time: 06/03/17 10:32 AM   Result Value Ref Range    Glucose (POC) 276 (H) 65 - 100 mg/dL    Performed by [employer], POC    Collection Time: 06/03/17 11:22 AM   Result Value Ref Range    Glucose (POC) 197 (H) 65 - 100 mg/dL    Performed by Elise Harmon, POC    Collection Time: 06/03/17  4:36 PM   Result Value Ref Range    Glucose (POC) 206 (H) 65 - 100 mg/dL    Performed by [employer], POC    Collection Time: 06/03/17 10:57 PM   Result Value Ref Range    Glucose (POC) 69 65 - 100 mg/dL    Performed by Juan Cole, POC    Collection Time: 06/03/17 10:58 PM   Result Value Ref Range    Glucose (POC) 78 65 - 100 mg/dL    Performed by Juan Cole, POC    Collection Time: 06/03/17 11:15 PM   Result Value Ref Range    Glucose (POC) 69 65 - 100 mg/dL    Performed by Cherre Olp    GLUCOSE, POC    Collection Time: 06/03/17 11:30 PM   Result Value Ref Range    Glucose (POC) 77 65 - 100 mg/dL    Performed by Cherre Olp    GLUCOSE, POC    Collection Time: 06/03/17 11:43 PM   Result Value Ref Range    Glucose (POC) 96 65 - 100 mg/dL    Performed by [facility]    METABOLIC PANEL, BASIC    Collection Time: 06/04/17  4:05 AM   Result Value Ref Range    Sodium 135 (L) 136 - 145 mmol/L    Potassium 3.7 3.5 - 5.1 mmol/L    Chloride 106 97 - 108 mmol/L    CO2 22 21 - 32 mmol/L    Anion gap 7 5 - 15 mmol/L    Glucose 117 (H) 65 - 100 mg/dL    BUN 29 (H) 6 - 20 MG/DL    Creatinine 0.98 0.55 - 1.02 MG/DL    BUN/Creatinine ratio 30 (H) 12 - 20      GFR est AA >60 >60 ml/min/1.73m2    GFR est non-AA 55 (L) >60 ml/min/1.73m2    Calcium 7.6 (L) 8.5 - 10.1 MG/DL   CBC WITH MANUAL DIFF    Collection Time: 06/04/17  4:05 AM   Result Value Ref Range    WBC 11.4 (H) 3.6 - 11.0 K/uL    RBC 3.14 (L) 3.80 - 5.20 M/uL    HGB 10.2 (L) 11.5 - 16.0 g/dL    HCT 28.9 (L) 35.0 - 47.0 %    MCV 92.0 80.0 - 99.0 FL    MCH 32.5 26.0 - 34.0 PG    MCHC 35.3 30.0 - 36.5 g/dL    RDW 14.5 11.5 - 14.5 %    PLATELET 147 462 - 445 K/uL    NEUTROPHILS 70 %    BAND NEUTROPHILS 0 %    LYMPHOCYTES 17 %    MONOCYTES 11 %    EOSINOPHILS 2 %    BASOPHILS 0 %    METAMYELOCYTES 0 %    MYELOCYTES 0 %    PROMYELOCYTES 0 %    BLASTS 0 %    OTHER CELL 0      ABS. NEUTROPHILS 8.0 K/UL    ABS. LYMPHOCYTES 1.9 K/UL    ABS. MONOCYTES 1.3 K/UL    ABS. EOSINOPHILS 0.2 K/UL    ABS.  BASOPHILS 0.0 K/UL    RBC COMMENTS NORMOCYTIC, NORMOCHROMIC      DF MANUAL      NRBC 0.0 0  WBC    ABSOLUTE NRBC 0.00 0.00 - 0.01 K/uL    DIFFERENTIAL MANUAL DIFFERENTIAL ORDERED     GLUCOSE, POC    Collection Time: 06/04/17  7:18 AM   Result Value Ref Range    Glucose (POC) 127 (H) 65 - 100 mg/dL    Performed by Love Corrigan            Assessment:     Active Problems:    DKA (diabetic ketoacidoses) ([facility] (6/2/2017)        Plan: 1. Cont treatment of DM. DKA resolved. 2.Cont hydration. 3.Will mobilize.

## 2017-06-04 NOTE — PROGRESS NOTES
PCU SHIFT NURSING NOTE      Bedside shift change report given to Tonny Squires RN (oncoming nurse) by Shaniqua Johnson RN (offgoing nurse). Report included the following information SBAR, Kardex, Intake/Output and Recent Results. Shift Summary:       Admission Date 6/2/2017   Admission Diagnosis DKA (diabetic ketoacidoses) ([facility]  DKA (diabetic ketoacidoses) ([facility]   Consults IP CONSULT TO HOSPITALIST        Consults   []PT   []OT   []Speech   []Case Management      [] Palliative      Cardiac Monitoring Order   []Yes   []No     IV drips   []Yes    Drip:                            Dose:  Drip:                            Dose:  Drip:                            Dose:   []No     GI Prophylaxis   []Yes   []No         DVT Prophylaxis   SCDs:             Brennon stockings:         [] Medication   []Contraindicated   []None      Activity Level Activity Level: Up with Assistance     Activity Assistance: Partial (one person)   Purposeful Rounding every 1-2 hour? []Yes   Buckner Score  Total Score: 3   Bed Alarm (If score 3 or >)   []Yes   [] Refused (See signed refusal form in chart)   Madhu Score  Madhu Score: 21   Madhu Score (if score 14 or less)   []PMT consult   []Wound Care consult      []Specialty bed   [] Nutrition consult          Needs prior to discharge:   Home O2 required:    []Yes   []No    If yes, how much O2 required?     Other:    Last Bowel Movement: Last Bowel Movement Date: 06/04/17      Influenza Vaccine Received Flu Vaccine for Current Season (usually Sept-March): Not Flu Season        Pneumonia Vaccine           Diet Active Orders   Diet    DIET DIABETIC CONSISTENT CARB Regular      LDAs               Peripheral IV 06/02/17 Right Wrist (Active)   Site Assessment Clean, dry, & intact 6/4/2017  2:44 PM   Phlebitis Assessment 0 6/4/2017  2:44 PM   Infiltration Assessment 0 6/4/2017  2:44 PM   Dressing Status Clean, dry, & intact 6/4/2017  2:44 PM   Dressing Type Transparent 6/4/2017  2:44 PM   Hub Color/Line Status Green 6/4/2017  2:44 PM       Peripheral IV 06/02/17 Left Wrist (Active)   Site Assessment Clean, dry, & intact 6/4/2017  2:44 PM   Phlebitis Assessment 0 6/4/2017  2:44 PM   Infiltration Assessment 0 6/4/2017  2:44 PM   Dressing Status Clean, dry, & intact 6/4/2017  2:44 PM   Dressing Type Transparent 6/4/2017  2:44 PM   Hub Color/Line Status Pink 6/4/2017  2:44 PM                      Urinary Catheter      Intake & Output   Date 06/03/17 1900 - 06/04/17 0659 06/04/17 0700 - 06/05/17 0659   Shift 0728-4472 24 Hour Total 8761-2474 2405-9877 24 Hour Total   I  N  T  A  K  E   P.O.  480 240  240      P. O.  480 240  240    I.V.  (mL/kg/hr) 2505.4 2505.4 1316.7  (1.4)  1316.7      Insulin Volume 17.9 17.9         Volume (dextrose 5 % - 0.45% NaCl infusion) 252.1 252.1         Volume (0.45% sodium chloride with KCl 20 mEq/L infusion) 2235.4 2235.4 1316.7  1316.7    Shift Total  (mL/kg) 2505.4  (31.6) 2985.4  (37.6) 1556.7  (19.6)  1556.7  (19.6)   O  U  T  P  U  T   Urine  (mL/kg/hr)  500         Urine Voided  500         Urine Occurrence(s)   1 x  1 x    Stool           Stool Occurrence(s)  1 x 1 x  1 x    Shift Total  (mL/kg)  500  (6.3)      NET 2505.4 2485. 4 1556.7  1556.7   Weight (kg) 79.4 79.4 79.4 79.4 79.4         Readmission Risk Assessment Tool Score Medium Risk            16       Total Score        3 Relationship with PCP    4 More than 1 Admission in calendar year    5 Patient Insurance is Medicare, Medicaid or Self Pay    4 Charlson Comorbidity Score        Criteria that do not apply:    Patient Living Status    Patient Length of Stay > 5       Expected Length of Stay - - -   Actual Length of Stay 2

## 2017-06-05 LAB
ANION GAP BLD CALC-SCNC: 7 MMOL/L (ref 5–15)
BASOPHILS # BLD AUTO: 0.1 K/UL
BASOPHILS # BLD: 1 %
BLASTS NFR BLD: 0 %
BUN SERPL-MCNC: 14 MG/DL (ref 6–20)
BUN/CREAT SERPL: 19 (ref 12–20)
CALCIUM SERPL-MCNC: 8.1 MG/DL (ref 8.5–10.1)
CHLORIDE SERPL-SCNC: 104 MMOL/L (ref 97–108)
CO2 SERPL-SCNC: 21 MMOL/L (ref 21–32)
CREAT SERPL-MCNC: 0.72 MG/DL (ref 0.55–1.02)
DIFFERENTIAL METHOD BLD: ABNORMAL
EOSINOPHIL # BLD: 0.2 K/UL
EOSINOPHIL NFR BLD: 3 %
ERYTHROCYTE [DISTWIDTH] IN BLOOD BY AUTOMATED COUNT: 14.4 % (ref 11.5–14.5)
GLUCOSE BLD STRIP.AUTO-MCNC: 201 MG/DL (ref 65–100)
GLUCOSE BLD STRIP.AUTO-MCNC: 206 MG/DL (ref 65–100)
GLUCOSE BLD STRIP.AUTO-MCNC: 327 MG/DL (ref 65–100)
GLUCOSE BLD STRIP.AUTO-MCNC: 82 MG/DL (ref 65–100)
GLUCOSE SERPL-MCNC: 265 MG/DL (ref 65–100)
HCT VFR BLD AUTO: 31.2 % (ref 35–47)
HGB BLD-MCNC: 11 G/DL (ref 11.5–16)
LYMPHOCYTES # BLD AUTO: 21 %
LYMPHOCYTES # BLD: 1.1 K/UL
MANUAL DIFFERENTIAL PERFORMED BLD QL: ABNORMAL
MCH RBC QN AUTO: 32.2 PG (ref 26–34)
MCHC RBC AUTO-ENTMCNC: 35.3 G/DL (ref 30–36.5)
MCV RBC AUTO: 91.2 FL (ref 80–99)
METAMYELOCYTES NFR BLD MANUAL: 0 %
MONOCYTES # BLD: 0.7 K/UL
MONOCYTES NFR BLD AUTO: 14 %
MYELOCYTES NFR BLD MANUAL: 0 %
NEUTS BAND NFR BLD MANUAL: 0 %
NEUTS SEG # BLD: 3.1 K/UL
NEUTS SEG NFR BLD AUTO: 61 %
NRBC # BLD: 0 K/UL (ref 0–0.01)
NRBC BLD-RTO: 0 PER 100 WBC
PLATELET # BLD AUTO: 187 K/UL (ref 150–400)
POTASSIUM SERPL-SCNC: 4.4 MMOL/L (ref 3.5–5.1)
PROMYELOCYTES NFR BLD MANUAL: 0 %
RBC # BLD AUTO: 3.42 M/UL (ref 3.8–5.2)
RBC MORPH BLD: ABNORMAL
SERVICE CMNT-IMP: ABNORMAL
SERVICE CMNT-IMP: NORMAL
SODIUM SERPL-SCNC: 132 MMOL/L (ref 136–145)
WBC # BLD AUTO: 5.2 K/UL (ref 3.6–11)
WBC OTHER # BLD MANUAL: 0 10*3/UL

## 2017-06-05 PROCEDURE — 74011636637 HC RX REV CODE- 636/637: Performed by: INTERNAL MEDICINE

## 2017-06-05 PROCEDURE — 74011250637 HC RX REV CODE- 250/637: Performed by: INTERNAL MEDICINE

## 2017-06-05 PROCEDURE — 65270000015 HC RM PRIVATE ONCOLOGY

## 2017-06-05 PROCEDURE — 82962 GLUCOSE BLOOD TEST: CPT

## 2017-06-05 PROCEDURE — 74011636637 HC RX REV CODE- 636/637: Performed by: EMERGENCY MEDICINE

## 2017-06-05 PROCEDURE — 36415 COLL VENOUS BLD VENIPUNCTURE: CPT | Performed by: INTERNAL MEDICINE

## 2017-06-05 PROCEDURE — 80048 BASIC METABOLIC PNL TOTAL CA: CPT | Performed by: INTERNAL MEDICINE

## 2017-06-05 PROCEDURE — 85027 COMPLETE CBC AUTOMATED: CPT | Performed by: INTERNAL MEDICINE

## 2017-06-05 PROCEDURE — 74011250636 HC RX REV CODE- 250/636: Performed by: INTERNAL MEDICINE

## 2017-06-05 RX ADMIN — INSULIN LISPRO 36 UNITS: 100 INJECTION, SUSPENSION SUBCUTANEOUS at 08:32

## 2017-06-05 RX ADMIN — Medication 10 ML: at 22:04

## 2017-06-05 RX ADMIN — Medication 10 ML: at 05:52

## 2017-06-05 RX ADMIN — FENOFIBRATE 145 MG: 145 TABLET ORAL at 08:31

## 2017-06-05 RX ADMIN — SODIUM CHLORIDE AND POTASSIUM CHLORIDE: 4.5; 1.49 INJECTION, SOLUTION INTRAVENOUS at 05:56

## 2017-06-05 RX ADMIN — LATANOPROST 1 DROP: 50 SOLUTION OPHTHALMIC at 22:04

## 2017-06-05 RX ADMIN — INSULIN LISPRO 5 UNITS: 100 INJECTION, SOLUTION INTRAVENOUS; SUBCUTANEOUS at 13:26

## 2017-06-05 RX ADMIN — HEPARIN SODIUM 5000 UNITS: 5000 INJECTION, SOLUTION INTRAVENOUS; SUBCUTANEOUS at 17:05

## 2017-06-05 RX ADMIN — CLOPIDOGREL BISULFATE 75 MG: 75 TABLET, FILM COATED ORAL at 08:31

## 2017-06-05 RX ADMIN — HEPARIN SODIUM 5000 UNITS: 5000 INJECTION, SOLUTION INTRAVENOUS; SUBCUTANEOUS at 05:52

## 2017-06-05 RX ADMIN — AMLODIPINE BESYLATE 10 MG: 5 TABLET ORAL at 08:31

## 2017-06-05 RX ADMIN — Medication 10 ML: at 13:27

## 2017-06-05 RX ADMIN — LEVOTHYROXINE SODIUM 150 MCG: 150 TABLET ORAL at 08:31

## 2017-06-05 NOTE — DIABETES MGMT
[facility]    Elevated A1C Visit Note    Recommendations/ Comments: Please consider adding low dose 75/25 ac d, adding humalog correction scale insulin ac & hs and discontinuing current humalog CHO coverage order as this was for use while in insulin gtt. Patient is a 77 y.o. female with known Type 2 Diabetes on novolog 70/30 30units ac b and sometimes 4-5units ac d if BG >200 at home. Pt denies missing any insulin prior to admission. Has been using this bottle of insulin since the end of last month. Pt reports rarely taking insulin at dinner for fear of overnight hypoglycemia. Reports waking with wide ranging BG- higher if no HS snack. Reviewed actions and timing of insulin. Enc pt to discuss with PCP if daily insulin at dinner is needed. Reviewed s/s and tx of hypoglycemia, actions of liver to help maintain BG overnight. Reviewed proper insulin storage and expiration. Pt used to test her BG 2-3times per day, however, has not been testing that frequently recently. Enc her to increase monitoring to better troubleshoot BG. Enc pt to contact outpt DTC for further education/troubleshooting and to keep a food diary/logbook for at least 3 days prior to appt.           A1c:   Lab Results   Component Value Date/Time    Hemoglobin A1c 9.2 06/02/2017 02:26 PM    Hemoglobin A1c 9.1 05/04/2017 04:26 PM       Recent Glucose Results:   Lab Results   Component Value Date/Time     (H) 06/05/2017 02:46 AM    GLUCPOC 82 06/05/2017 04:26 PM    GLUCPOC 201 (H) 06/05/2017 11:41 AM    GLUCPOC 327 (H) 06/05/2017 07:26 AM        Lab Results   Component Value Date/Time    Creatinine 0.72 06/05/2017 02:46 AM       Active Orders   Diet    DIET DIABETIC CONSISTENT CARB Regular          Assessed and instructed patient on the following:   ·  interpretation of lab results, blood sugar goals, hypoglycemia prevention and treatment, nutrition and referred to Diabetes Educator    Provided patient with the following: [x]          Diabetes Self-Care Guide               [x]          Insulin education materials               []          Diabetes survival skills handout               []          BG guidelines for post-op patients               []          \"Decreasing  the Cost of Diabetes Care\"               [x]          Outpatient DTC contact number          Patient to follow up with PCP after discharge. Will continue to follow as needed. Thank you.   ÓSCAR Noel, RN, [address]

## 2017-06-05 NOTE — CDMP QUERY
Abdi Osuna,    Do you agree with the admitting physicians H&P that pt had SIRS POA, initial clinical findings/ lab derangements meet SIRS with organ dysfunction POA criteria? SIRS with organ dysfunction in the setting of ONEYDA requiring IV fluids POA?  =>Other Explanation of clinical findings  =>Unable to Determine (no explanation of clinical findings)    The medical record reflects the following clinical findings, treatment, and risk factors:    Risk Factors: 76yoM ERA w/ SIRS, DKA stopped taking insulin x 1mo, ONEYDA, LA, AGMA, leuko, hyperk+, hyponatremia  Clinical Indicators: ONEYDA d/t dehydration from DKA nausea/vomiting, cr-2.0, co2-7, k-5.8, gluc-861, na-130, AG-27, LA-4.5, 100.1, P 113, /38  Treatment: IV fluids, labs, insulin gtt,     Please clarify and document your clinical opinion in the progress notes and discharge summary including the definitive and/or presumptive diagnosis, (suspected or probable), related to the above clinical findings. Please include clinical findings supporting your diagnosis.     Thank you,  Ahsan Hdz, [facility], . [address]

## 2017-06-05 NOTE — PROGRESS NOTES
General Daily Progress Note    Admit Date: 6/2/2017    Subjective:     Patient has no complaint. Current Facility-Administered Medications   Medication Dose Route Frequency    insulin lispro protamine/insulin lispro (HUMALOG MIX 75/25) injection 36 Units  36 Units SubCUTAneous ACB    loperamide (IMODIUM) capsule 4 mg  4 mg Oral PRN    0.45% sodium chloride with KCl 20 mEq/L infusion   IntraVENous CONTINUOUS    insulin lispro (HUMALOG) injection   SubCUTAneous TIDAC    glucose chewable tablet 16 g  4 Tab Oral PRN    dextrose 10% infusion 125-250 mL  125-250 mL IntraVENous PRN    glucagon (GLUCAGEN) injection 1 mg  1 mg IntraMUSCular PRN    amLODIPine (NORVASC) tablet 10 mg  10 mg Oral DAILY    clopidogrel (PLAVIX) tablet 75 mg  75 mg Oral DAILY    fenofibrate nanocrystallized (TRICOR) tablet 145 mg  145 mg Oral DAILY    latanoprost (XALATAN) 0.005 % ophthalmic solution 1 Drop  1 Drop Both Eyes QHS    levothyroxine (SYNTHROID) tablet 150 mcg  150 mcg Oral 7am    sodium chloride (NS) flush 5-10 mL  5-10 mL IntraVENous Q8H    sodium chloride (NS) flush 5-10 mL  5-10 mL IntraVENous PRN    acetaminophen (TYLENOL) tablet 650 mg  650 mg Oral Q4H PRN    ondansetron (ZOFRAN) injection 4 mg  4 mg IntraVENous Q4H PRN    bisacodyl (DULCOLAX) tablet 5 mg  5 mg Oral DAILY PRN    heparin (porcine) injection 5,000 Units  5,000 Units SubCUTAneous Q12H        Review of Systems  A comprehensive review of systems was negative.     Objective:     Patient Vitals for the past 24 hrs:   BP Temp Pulse Resp SpO2   06/05/17 0735 (!) 144/92 97.9 °F (36.6 °C) 80 18 97 %   06/05/17 0141 161/83 97.6 °F (36.4 °C) 77 16 99 %   06/05/17 0101 188/69 98 °F (36.7 °C) 81 18 98 %   06/04/17 1910 125/62 97.9 °F (36.6 °C) 80 18 99 %   06/04/17 1556 (!) 112/98 98 °F (36.7 °C) 78 18 99 %   06/04/17 1154 140/53 98.1 °F (36.7 °C) 75 18 99 %   06/04/17 0750 141/57 98.3 °F (36.8 °C) 77 18 98 %        06/03 1901 - 06/05 0700  In: 2737 [P.O.:480; I.V.:5397]  Out: 1350 [Urine:1350]    Physical Exam:   Visit Vitals    BP (!) 144/92    Pulse 80    Temp 97.9 °F (36.6 °C)    Resp 18    Ht 5' 3\" (1.6 m)    Wt 175 lb (79.4 kg)    SpO2 97%    BMI 31 kg/m2     General appearance: alert, cooperative, no distress, appears stated age  Neck: supple, symmetrical, trachea midline, no adenopathy, thyroid: not enlarged, symmetric, no tenderness/mass/nodules, no carotid bruit and no JVD  Lungs: clear to auscultation bilaterally  Heart: regular rate and rhythm, S1, S2 normal, no murmur, click, rub or gallop  Abdomen: soft, non-tender.  Bowel sounds normal. No masses,  no organomegaly  Extremities: extremities normal, atraumatic, no cyanosis or edema        Data Review   Recent Results (from the past 24 hour(s))   GLUCOSE, POC    Collection Time: 06/04/17 11:50 AM   Result Value Ref Range    Glucose (POC) 212 (H) 65 - 100 mg/dL    Performed by Linden Glass    GLUCOSE, POC    Collection Time: 06/04/17  4:41 PM   Result Value Ref Range    Glucose (POC) 85 65 - 100 mg/dL    Performed by [address] [place] [address] Avenue, POC    Collection Time: 06/04/17 11:35 PM   Result Value Ref Range    Glucose (POC) 253 (H) 65 - 100 mg/dL    Performed by Norberto     METABOLIC PANEL, BASIC    Collection Time: 06/05/17  2:46 AM   Result Value Ref Range    Sodium 132 (L) 136 - 145 mmol/L    Potassium 4.4 3.5 - 5.1 mmol/L    Chloride 104 97 - 108 mmol/L    CO2 21 21 - 32 mmol/L    Anion gap 7 5 - 15 mmol/L    Glucose 265 (H) 65 - 100 mg/dL    BUN 14 6 - 20 MG/DL    Creatinine 0.72 0.55 - 1.02 MG/DL    BUN/Creatinine ratio 19 12 - 20      GFR est AA >60 >60 ml/min/1.73m2    GFR est non-AA >60 >60 ml/min/1.73m2    Calcium 8.1 (L) 8.5 - 10.1 MG/DL   CBC WITH MANUAL DIFF    Collection Time: 06/05/17  2:46 AM   Result Value Ref Range    WBC 5.2 3.6 - 11.0 K/uL    RBC 3.42 (L) 3.80 - 5.20 M/uL    HGB 11.0 (L) 11.5 - 16.0 g/dL    HCT 31.2 (L) 35.0 - 47.0 %    MCV 91.2 80.0 - 99.0 FL    MCH 32.2 26.0 - 34.0 PG    MCHC 35.3 30.0 - 36.5 g/dL    RDW 14.4 11.5 - 14.5 %    PLATELET 441 364 - 210 K/uL    NEUTROPHILS 61 %    BAND NEUTROPHILS 0 %    LYMPHOCYTES 21 %    MONOCYTES 14 %    EOSINOPHILS 3 %    BASOPHILS 1 %    METAMYELOCYTES 0 %    MYELOCYTES 0 %    PROMYELOCYTES 0 %    BLASTS 0 %    OTHER CELL 0      ABS. NEUTROPHILS 3.1 K/UL    ABS. LYMPHOCYTES 1.1 K/UL    ABS. MONOCYTES 0.7 K/UL    ABS. EOSINOPHILS 0.2 K/UL    ABS. BASOPHILS 0.1 K/UL    RBC COMMENTS NORMOCYTIC, NORMOCHROMIC      DF MANUAL      NRBC 0.0 0  WBC    ABSOLUTE NRBC 0.00 0.00 - 0.01 K/uL    DIFFERENTIAL MANUAL DIFFERENTIAL ORDERED     GLUCOSE, POC    Collection Time: 06/05/17  7:26 AM   Result Value Ref Range    Glucose (POC) 327 (H) 65 - 100 mg/dL    Performed by Sea Borden            Assessment:     Active Problems:    [facility]diabetic k[facility]acidoses[facility] (6/2/2017)        Plan: 1. Cont diabetic treatment and hydration. Cont to monitor CO2. 2.Will mobilize.

## 2017-06-06 VITALS
DIASTOLIC BLOOD PRESSURE: 49 MMHG | HEART RATE: 62 BPM | OXYGEN SATURATION: 98 % | TEMPERATURE: 98.2 F | SYSTOLIC BLOOD PRESSURE: 130 MMHG | HEIGHT: 63 IN | BODY MASS INDEX: 31.01 KG/M2 | RESPIRATION RATE: 16 BRPM | WEIGHT: 175 LBS

## 2017-06-06 LAB
ANION GAP BLD CALC-SCNC: 6 MMOL/L (ref 5–15)
BUN SERPL-MCNC: 11 MG/DL (ref 6–20)
BUN/CREAT SERPL: 17 (ref 12–20)
CALCIUM SERPL-MCNC: 7.8 MG/DL (ref 8.5–10.1)
CHLORIDE SERPL-SCNC: 105 MMOL/L (ref 97–108)
CO2 SERPL-SCNC: 25 MMOL/L (ref 21–32)
CREAT SERPL-MCNC: 0.65 MG/DL (ref 0.55–1.02)
GLUCOSE BLD STRIP.AUTO-MCNC: 177 MG/DL (ref 65–100)
GLUCOSE BLD STRIP.AUTO-MCNC: 225 MG/DL (ref 65–100)
GLUCOSE SERPL-MCNC: 151 MG/DL (ref 65–100)
POTASSIUM SERPL-SCNC: 4 MMOL/L (ref 3.5–5.1)
SERVICE CMNT-IMP: ABNORMAL
SERVICE CMNT-IMP: ABNORMAL
SODIUM SERPL-SCNC: 136 MMOL/L (ref 136–145)

## 2017-06-06 PROCEDURE — 80048 BASIC METABOLIC PNL TOTAL CA: CPT | Performed by: INTERNAL MEDICINE

## 2017-06-06 PROCEDURE — 74011636637 HC RX REV CODE- 636/637: Performed by: INTERNAL MEDICINE

## 2017-06-06 PROCEDURE — 74011250636 HC RX REV CODE- 250/636: Performed by: INTERNAL MEDICINE

## 2017-06-06 PROCEDURE — 74011250637 HC RX REV CODE- 250/637: Performed by: INTERNAL MEDICINE

## 2017-06-06 PROCEDURE — 82962 GLUCOSE BLOOD TEST: CPT

## 2017-06-06 PROCEDURE — 36415 COLL VENOUS BLD VENIPUNCTURE: CPT | Performed by: INTERNAL MEDICINE

## 2017-06-06 RX ORDER — INSULIN ASPART 100 [IU]/ML
36 INJECTION, SUSPENSION SUBCUTANEOUS
Qty: 10 ML | Refills: 0 | Status: SHIPPED | OUTPATIENT
Start: 2017-06-06 | End: 2017-09-11 | Stop reason: SDUPTHER

## 2017-06-06 RX ADMIN — LEVOTHYROXINE SODIUM 150 MCG: 150 TABLET ORAL at 06:13

## 2017-06-06 RX ADMIN — HEPARIN SODIUM 5000 UNITS: 5000 INJECTION, SOLUTION INTRAVENOUS; SUBCUTANEOUS at 06:13

## 2017-06-06 RX ADMIN — CLOPIDOGREL BISULFATE 75 MG: 75 TABLET, FILM COATED ORAL at 08:27

## 2017-06-06 RX ADMIN — INSULIN LISPRO 36 UNITS: 100 INJECTION, SUSPENSION SUBCUTANEOUS at 08:27

## 2017-06-06 RX ADMIN — Medication 10 ML: at 06:14

## 2017-06-06 RX ADMIN — AMLODIPINE BESYLATE 10 MG: 5 TABLET ORAL at 08:27

## 2017-06-06 RX ADMIN — FENOFIBRATE 145 MG: 145 TABLET ORAL at 08:27

## 2017-06-06 RX ADMIN — ACETAMINOPHEN 650 MG: 325 TABLET, FILM COATED ORAL at 08:30

## 2017-06-06 NOTE — PROGRESS NOTES
TRANSFER - OUT REPORT:    Verbal report given to Codey RN (name) on Marisela Pena  being transferred to Oncology (unit) for routine progression of care       Report consisted of patients Situation, Background, Assessment and   Recommendations(SBAR). Information from the following report(s) SBAR, Kardex, Intake/Output, MAR, Recent Results and Cardiac Rhythm NSR was reviewed with the receiving nurse. Lines:   Peripheral IV 06/02/17 Right Wrist (Active)   Site Assessment Clean, dry, & intact 6/5/2017  7:36 PM   Phlebitis Assessment 0 6/5/2017  7:36 PM   Infiltration Assessment 0 6/5/2017  7:36 PM   Dressing Status Clean, dry, & intact 6/5/2017  7:36 PM   Dressing Type Transparent;Tape 6/5/2017  7:36 PM   Hub Color/Line Status Green;Flushed 6/5/2017  7:36 PM        Opportunity for questions and clarification was provided.       Patient transported with:   Patient-specific medications from Pharmacy

## 2017-06-06 NOTE — DISCHARGE INSTRUCTIONS
General Discharge Instructions    Patient ID:  Marisela Pena  [id number]  77 y.o.  1940    Patient Instructions        The following personal items were collected during your admission and were returned to you. Take Home Medications           What to do at Home    Recommended diet: Diabetic Diet    Recommended activity: Activity as tolerated    Follow-up with Pipo Irizarry MD  in 4 days. Information obtained by :  I understand that if any problems occur once I am at home I am to contact my physician. I understand and acknowledge receipt of the instructions indicated above.                                                                                                                                            Physician's or R.N.'s Signature                                                                  Date/Time                                                                                                                                              Patient or Representative Signature                                                          Date/Time

## 2017-06-06 NOTE — PROGRESS NOTES
PCU SHIFT NURSING NOTE      Bedside shift change report given to Yu Keita RN (oncoming nurse) by Lupe Jean Baptiste RN (offgoing nurse). Report included the following information SBAR, Kardex, Intake/Output, MAR, Recent Results and Cardiac Rhythm NSR. Shift Summary:     8:38 PM  Called Oncology to give report. Patient going to room #1118.    8:46 PM  Report given to [facility]. Will put in for transport to take patient down. 9:40 PM  Transport arrived to transfer patient down to Oncology. Patient off floor. Admission Date 6/2/2017   Admission Diagnosis DKA (diabetic ketoacidoses) ([facility]  DKA (diabetic ketoacidoses) ([facility]   Consults IP CONSULT TO HOSPITALIST        Consults   []PT   []OT   []Speech   []Case Management      [] Palliative      Cardiac Monitoring Order   []Yes   []No     IV drips   []Yes    Drip:                            Dose:  Drip:                            Dose:  Drip:                            Dose:   []No     GI Prophylaxis   []Yes   []No         DVT Prophylaxis   SCDs:             Brennon stockings:         [] Medication   []Contraindicated   []None      Activity Level Activity Level: Up with Assistance     Activity Assistance: Partial (one person)   Purposeful Rounding every 1-2 hour? [x]Yes   Buckner Score  Total Score: 3   Bed Alarm (If score 3 or >)   [x]Yes   [] Refused (See signed refusal form in chart)   Madhu Score  Madhu Score: 21   Madhu Score (if score 14 or less)   []PMT consult   []Wound Care consult      []Specialty bed   [] Nutrition consult          Needs prior to discharge:   Home O2 required:    []Yes   []No    If yes, how much O2 required?     Other:    Last Bowel Movement: Last Bowel Movement Date: 06/04/17      Influenza Vaccine Received Flu Vaccine for Current Season (usually Sept-March): Not Flu Season        Pneumonia Vaccine           Diet Active Orders   Diet    DIET DIABETIC CONSISTENT CARB Regular      LDAs               Peripheral IV 06/02/17 Right Wrist (Active) Site Assessment Clean, dry, & intact 6/5/2017  7:36 PM   Phlebitis Assessment 0 6/5/2017  7:36 PM   Infiltration Assessment 0 6/5/2017  7:36 PM   Dressing Status Clean, dry, & intact 6/5/2017  7:36 PM   Dressing Type Transparent;Tape 6/5/2017  7:36 PM   Hub Color/Line Status Green;Flushed 6/5/2017  7:36 PM       Peripheral IV 06/02/17 Left Wrist (Active)   Site Assessment Clean, dry, & intact 6/5/2017  3:20 AM   Phlebitis Assessment 0 6/5/2017  3:20 AM   Infiltration Assessment 0 6/5/2017  3:20 AM   Dressing Status Clean, dry, & intact 6/5/2017  3:20 AM   Dressing Type Tape;Transparent 6/5/2017  3:20 AM   Hub Color/Line Status Pink; Infusing 6/5/2017  3:20 AM                      Urinary Catheter      Intake & Output   Date 06/04/17 1900 - 06/05/17 0659 06/05/17 0700 - 06/06/17 0659   Shift 0791-8545 24 Hour Total 0757-8682 2338-1687 24 Hour Total   I  N  T  A  K  E   P.O. 240 480 480  480      P. O. 240 480 480  480    I.V.  (mL/kg/hr) 1575 2891.7 1347.9  (1.4)  1347.9      Volume (0.45% sodium chloride with KCl 20 mEq/L infusion) 1575 2891.7 1347.9  1347.9    Shift Total  (mL/kg) 1815  (22.9) 3371.7  (42.5) 1827.9  (23)  1827.9  (23)   O  U  T  P  U  T   Urine  (mL/kg/hr) 1350 1350 1000  (1) 475 1475      Urine Voided 1350 1350 9153 482 5628      Urine Occurrence(s) 2 x 3 x  1 x 1 x    Stool           Stool Occurrence(s)  1 x       Shift Total  (mL/kg) 1350  (17) 1350  (17) 1000  (12.6) 475  (6) 1475  (18.6)    2021.7 827.9 -475 352.9   Weight (kg) 79.4 79.4 79.4 79.4 79.4         Readmission Risk Assessment Tool Score Medium Risk            16       Total Score        3 Relationship with PCP    4 More than 1 Admission in calendar year    5 Patient Insurance is Medicare, Medicaid or Self Pay    4 Charlson Comorbidity Score        Criteria that do not apply:    Patient Living Status    Patient Length of Stay > 5       Expected Length of Stay 3d 0h   Actual Length of Stay 3

## 2017-06-06 NOTE — DISCHARGE SUMMARY
[address] SUMMARY       Name:  Maureen Arauz   MR#:  [id number]   :  1940   Account #:  [de-identified]        Date of Adm:  2017       HISTORY OF PRESENT ILLNESS:  The patient is a 78-year-old lady   who was doing well up until 4 days prior to her admission. She   developed recurrent nausea and vomiting and diarrhea. This persisted   to the point where she became profoundly weak. She subsequently   presented to the emergency room where she was dehydrated as well   as in ketoacidosis with marked hypoglycemia. In view of this, it was   elected to admit her for further evaluation and care. The patient has   been a diabetic for the last 30+ years. She is maintained on a premix   insulin although advised to change this regimen which she has refused   to date. PAST MEDICAL HISTORY, SOCIAL HISTORY, REVIEW OF   SYSTEMS, FAMILY HISTORY, PHYSICAL EXAMINATION:  As per   the admitting H and P.     LAB VALUES:  Initial hemoglobin was 11.4, white count 13. 4. MCV   was 96.4, platelet count 081. .  White   count on  was 5.2 with a hemoglobin of 11.0. Urinalysis normal   except for 1000 mg per deciliter glycosuria and 40 mg per deciliter   ketonuria. Abnormalities on the comprehensive profile were limited to a   potassium of 5.8, BUN and creatinine 47 and 2.0, phosphorus   of 8.4, and lactic acid of 4.5. Hemoglobin A1c was 9.2. At the time of   discharge, BUN and creatinine were 11 and 0.65 with a PO2 of 25. Blood cultures were negative. Initially, ABGs revealed a pCO2 14, PO2   123. RADIOGRAPHS:  Chest x-ray, there is slight cardiomegaly only. HOSPITAL COURSE:  The patient was admitted and placed on an   insulin drip along with hydration. With this, her ketoacidosis broke   within a relatively short time. Subjectively, she felt much better.   I   resumed her premix insulin, and continued to improve. Hospital course was otherwise uneventful. FINAL DIAGNOSES   1. Diabetic ketoacidosis. 2.  Diabetes mellitus type 2    3. Dehydration. 4.  Viral gastroenteritis. 5.  Primary hypertension. 6.  Dyslipidemia. DISPOSITION:  The patient will be discharged home ambulatory on   ADA diet with bedtime snack. The patient remains a FULL CODE. DISCHARGE MEDICATIONS   Include the following   1. Amlodipine 10 mg daily. 2.  Alphagan 0.15%, directions not available. 3.  Clopidogrel 75 mg daily. 4.  Fenofibrate 145 mg daily. 5.  NovoLog 70/30, 36 units q.a.m. She occasionally takes4 units   a.c. dinner, depending on her blood sugar, specifically if it is over 200.   6.  Levoxyl 0.15 mg daily. 7.  Losartan/hydrochlorothiazide 100/25 q.a.m. I reminded her to eat at the same time every day, breakfast, lunch,   dinner, 8 a.m., 12 noon, 5 p.m. and a bedtime snack. Timeliness of   eating is very important to minimize hypoglycemia. On her return visit   to the office, I will again discuss more different insulin regimens that   would be probably safer for her. CHIDI Mathews MD      LAB / NB   D:  06/06/2017   08:10   T:  06/06/2017   12:18   Job #:  [id number]

## 2017-06-06 NOTE — PROGRESS NOTES
Primary Nurse Sally Guardado RN and Reinier Landa RN performed a dual skin assessment on this patient No impairment noted  Madhu score is 21

## 2017-06-07 LAB
BACTERIA SPEC CULT: NORMAL
SERVICE CMNT-IMP: NORMAL

## 2017-06-09 ENCOUNTER — PATIENT OUTREACH (OUTPATIENT)
Dept: INTERNAL MEDICINE CLINIC | Age: 77
End: 2017-06-09

## 2017-06-09 NOTE — PROGRESS NOTES
NNTOCIP:  [facility] Room 6/2-6/6/2017:  Hyperglycemia  Hospital Discharge Follow-Up      Date/Time:  6/9/2017 4:12 PM    Patient listed on discharge [facility]) report on 6/6/2017. Patient discharged from [facility] for Hyperglycemia 6/2-6/6/2017. FINAL DIAGNOSES   1. Diabetic ketoacidosis. 2. Diabetes mellitus type 2    3. Dehydration. 4. Viral gastroenteritis. 5. Primary hypertension. 6. Dyslipidemia. Chart Review:  The patient is a 78-year-old lady   who was doing well up until 4 days prior to her admission. She   developed recurrent nausea and vomiting and diarrhea. This persisted   to the point where she became profoundly weak. She subsequently   presented to the emergency room where she was dehydrated as well   as in ketoacidosis with marked hypoglycemia. In view of this, it was   elected to admit her for further evaluation and care. The patient has   been a diabetic for the last 30+ years. She is maintained on a premix   insulin although advised to change this regimen which she has refused   to date. Results for Yadira Villarreal (MRN [id number]) as of 6/9/2017 16:58   Ref. Range 6/2/2017 18:22 6/2/2017 19:50 6/2/2017 20:57   GLUCOSE,FAST - POC Latest Ref Range: 65 - 100 mg/dL 532 (H) 415 (H) 337 (H)     Results for Yadira Villarreal (MRN [id number]) as of 6/9/2017 16:58   Ref.  Range 6/2/2017 18:02   WBC Latest Ref Range: 3.6 - 11.0 K/uL 16.9 (H)   RBC Latest Ref Range: 3.80 - 5.20 M/uL 3.25 (L)   HGB Latest Ref Range: 11.5 - 16.0 g/dL 10.3 (L)   HCT Latest Ref Range: 35.0 - 47.0 % 31.0 (L)   MCV Latest Ref Range: 80.0 - 99.0 FL 95.4   MCH Latest Ref Range: 26.0 - 34.0 PG 31.7   MCHC Latest Ref Range: 30.0 - 36.5 g/dL 33.2   RDW Latest Ref Range: 11.5 - 14.5 % 14.2   PLATELET Latest Ref Range: 150 - 400 K/uL 295   NEUTROPHILS Latest Ref Range: 32 - 75 % 88 (H)   LYMPHOCYTES Latest Ref Range: 12 - 49 % 8 (L)   MONOCYTES Latest Ref Range: 5 - 13 % 4 (L)   EOSINOPHILS Latest Ref Range: 0 - 7 % 0 Results for Teofilo Case (MRN [id number]) as of 2017 16:58   Ref. Range 2017 18:02 2017 18:03   CO2 Latest Ref Range: 21 - 32 mmol/L 7 (LL)    Anion gap Latest Ref Range: 5 - 15 mmol/L 26 (H)    Glucose Latest Ref Range: 65 - 100 mg/dL 576 (H)    BUN Latest Ref Range: 6 - 20 MG/DL 44 (H)    Creatinine Latest Ref Range: 0.55 - 1.02 MG/DL 1.88 (H)    BUN/Creatinine ratio Latest Ref Range: 12 - 20   23 (H)    Calcium Latest Ref Range: 8.5 - 10.1 MG/DL 8.1 (L)    Phosphorus Latest Ref Range: 2.6 - 4.7 MG/DL 5.3 (H)    Magnesium Latest Ref Range: 1.6 - 2.4 mg/dL 2.8 (H)    GFR est non-AA Latest Ref Range: >60 ml/min/1.73m2 26 (L)    GFR est AA Latest Ref Range: >60 ml/min/1.73m2 32 (L)    Lactic acid Latest Ref Range: 0.4 - 2.0 MMOL/L  5.3 (HH)     RRAT score: Low Risk            11       Total Score        3 Relationship with PCP    4 More than 1 Admission in calendar year    4 Charlson Comorbidity Score        Criteria that do not apply:    Patient Living Status    Patient Length of Stay > 5    Patient Insurance is Medicare, Medicaid or Self Pay     Low    Medical History:     Past Medical History:   Diagnosis Date    Diabetes ([facility]     Heart failure ([facility] [facility]     unknown to family    Hypercholesteremia     Hypertension     Stroke ([facility]     Thyroid disease      Nurse Navigator(NN) contacted the patient by telephone to perform post [facility] IP  discharge assessment. Verified  and address with patient as identifiers. Provided introduction to self, and explanation of the Nurses Navigator role. Diet:   Patient reports: Diabetic Diet    Activity:    Patient reports: somewalking outside the house    Medication:   Performed medication reconciliation with patient, and patient verbalizes understanding of administration of home medications. There were no barriers to obtaining medications identified at this time.     Support system:  patient and son    Discharge Instructions :  Reviewed discharge instructions with patient. Patient verbalizes understanding of discharge instructions and follow-up care. Red Flags:  Polydipsia. Polyuria. Weakness. Advance Care Planning:   Patient was offered the opportunity to discuss advance care planning:  yes     Does patient have an Advance Directive:  no   If no, did you provide information on [facility]? yes     PCP/Specialist follow up: Patient scheduled to follow up with Yvonne Aguiar MD on 6/12/2017. Reviewed red flags with patient, and patient verbalizes understanding. Patient given an opportunity to ask questions. No other clinical/social/functional needs noted. The patient agrees to contact the PCP office for questions related to their healthcare. The patient expressed thanks, offered no additional questions and ended the call. Case management plan: Attempt to contact the patient by telephone or during office visit within the next 7-10 days. Will continue to follow as necessary for the next 30 days. Will reassess for case management needs prior to discharge from case management service on or about 30 days.

## 2017-06-11 RX ORDER — LOSARTAN POTASSIUM AND HYDROCHLOROTHIAZIDE 25; 100 MG/1; MG/1
TABLET ORAL
Qty: 30 TAB | Refills: 11 | Status: SHIPPED | OUTPATIENT
Start: 2017-06-11 | End: 2017-06-14 | Stop reason: SDUPTHER

## 2017-06-12 ENCOUNTER — OFFICE VISIT (OUTPATIENT)
Dept: INTERNAL MEDICINE CLINIC | Age: 77
End: 2017-06-12

## 2017-06-12 VITALS
SYSTOLIC BLOOD PRESSURE: 156 MMHG | BODY MASS INDEX: 30.99 KG/M2 | HEIGHT: 63 IN | OXYGEN SATURATION: 98 % | DIASTOLIC BLOOD PRESSURE: 67 MMHG | HEART RATE: 76 BPM | TEMPERATURE: 97.7 F | RESPIRATION RATE: 15 BRPM | WEIGHT: 174.9 LBS

## 2017-06-12 DIAGNOSIS — R60.9 EDEMA, UNSPECIFIED TYPE: ICD-10-CM

## 2017-06-12 DIAGNOSIS — I10 ESSENTIAL HYPERTENSION: ICD-10-CM

## 2017-06-12 DIAGNOSIS — Z79.4 TYPE 2 DIABETES MELLITUS WITHOUT COMPLICATION, WITH LONG-TERM CURRENT USE OF INSULIN (HCC): ICD-10-CM

## 2017-06-12 DIAGNOSIS — E11.9 TYPE 2 DIABETES MELLITUS WITHOUT COMPLICATION, WITH LONG-TERM CURRENT USE OF INSULIN (HCC): ICD-10-CM

## 2017-06-12 DIAGNOSIS — E78.5 DYSLIPIDEMIA: ICD-10-CM

## 2017-06-12 DIAGNOSIS — E10.10 DIABETIC KETOACIDOSIS WITHOUT COMA ASSOCIATED WITH TYPE 1 DIABETES MELLITUS (HCC): Primary | ICD-10-CM

## 2017-06-12 RX ORDER — PEN NEEDLE, DIABETIC 31 GX5/16"
NEEDLE, DISPOSABLE MISCELLANEOUS
Refills: 11 | COMMUNITY
Start: 2017-04-11 | End: 2018-06-01 | Stop reason: SDUPTHER

## 2017-06-12 NOTE — PROGRESS NOTES
[facility]  [facility]  [address] [place] [address]  Phone:  [phone]  Fax: [phone]       Chief Complaint   Patient presents with   MARIA LUISA OLEA [facility] follow up   . SUBJECTIVE:    Marjorie Melton is a 77 y.o. female comes in for return visit after being hospitalized for ketoacidosis. What she originally what she told me in the hospital was not true. She states she had defective needles which were recalled. Apparently she was not getting her insulin that she gave to herself leading to the hypoglycemia and development of ketoacidosis. Since being home she has done well. She is taking 36 units in the morning but none before dinner. Her blood sugars vary significantly for fear of hypoglycemia. She has not been taking her antihypertensive medication as prescribed. She has been taking her thyroid supplement and Fenofibrate. She has not been taking her statin. Current Outpatient Prescriptions   Medication Sig Dispense Refill    BD INSULIN PEN NEEDLE UF SHORT 31 gauge x 5/16\" ndle USE AS DIRECTED TWICE A DAY  11    insulin aspart protamine/insulin aspart (NOVOLOG MIX 70-30) 100 unit/mL (70-30) injection 36 Units by SubCUTAneous route Daily (before breakfast). 10 mL 0    levothyroxine (SYNTHROID) 150 mcg tablet TAKE 1 TABLET BY MOUTH EVERY DAY 30 Tab 11    amLODIPine (NORVASC) 10 mg tablet TAKE 1 TABLET BY MOUTH EVERY MORNING 30 Tab 11    Insulin Syringe-Needle U-100 (BD INSULIN SYRINGE ULTRA-FINE) 1/2 mL 30 gauge x 1/2\" syrg Use as directed daily. E11.9 100 Syringe 11    brimonidine (ALPHAGAN) 0.15 % ophthalmic solution       latanoprost (XALATAN) 0.005 % ophthalmic solution Administer 1 Drop to both eyes nightly.  clopidogrel (PLAVIX) 75 mg tab Take 1 Tab by mouth daily.  30 Tab 11    fenofibrate nanocrystallized (TRICOR) 145 mg tablet TAKE 1 TABLET BY MOUTH EVERY DAY 30 Tab 11    losartan-hydroCHLOROthiazide (HYZAAR) 100-25 mg per tablet TAKE 1 TABLET ONCE A DAY ORALLY 30 Tab 11     Past Medical History:   Diagnosis Date    [facility]     Heart failure ([facility]     unknown to family    Hypercholesteremia     Hypertension     Stroke ([facility]     Thyroid disease      Past Surgical History:   Procedure Laterality Date    HX GYN      HX HEENT      thyroidectomy    HX HYSTERECTOMY      REMOVAL GALLBLADDER      THYROIDECTOMY       No Known Allergies      REVIEW OF SYSTEMS:  General: negative for - chills or fever  ENT: negative for - headaches, nasal congestion or tinnitus  Respiratory: negative for - cough, hemoptysis, shortness of breath or wheezing  Cardiovascular : negative for - chest pain, edema, palpitations or shortness of breath  Gastrointestinal: negative for - abdominal pain, blood in stools, heartburn or nausea/vomiting  Genito-Urinary: no dysuria, trouble voiding, or hematuria  Musculoskeletal: negative for - gait disturbance, joint pain, joint stiffness or joint swelling  Neurological: no TIA or stroke symptoms  Hematologic: no bruises, no bleeding, no swollen glands  Integument: no lumps, mole changes, nail changes or rash  Endocrine: no malaise/lethargy or unexpected weight changes      Social History     Social History    Marital status:      Spouse name: N/A    Number of children: 1    Years of education: N/A     Occupational History    retired      Social History Main Topics    Smoking status: Never Smoker    Smokeless tobacco: Never Used    Alcohol use Yes      Comment: been years    Drug use: No    Sexual activity: Not Currently     Other Topics Concern    None     Social History Narrative     Family History   Problem Relation Age of Onset    Diabetes Father     No Known Problems Mother        OBJECTIVE:    Visit Vitals    /67 (BP 1 Location: Left arm, BP Patient Position: Sitting)    Pulse 76    Temp 97.7 °F (36.5 °C) (Oral)    Resp 15    Ht 5' 3\" (1.6 m)    Wt 174 lb 14.4 oz (79.3 kg)    SpO2 98%    BMI 30.98 kg/m2     CONSTITUTIONAL: well , well nourished, appears age appropriate  EYES: perrla, eom intact  ENMT:moist mucous membranes, pharynx clear  NECK: supple. Thyroid normal  RESPIRATORY: Chest: clear to ascultation and percussion   CARDIOVASCULAR: Heart: regular rate and rhythm  GASTROINTESTINAL: Abdomen: soft, bowel sounds active  HEMATOLOGIC: no pathological lymph nodes palpated  MUSCULOSKELETAL: Extremities: 1+ edema distally, pulse 1+   INTEGUMENT: No unusual rashes or suspicious skin lesions noted. Nails appear normal.  NEUROLOGIC: non-focal exam   MENTAL STATUS: alert and oriented, appropriate affect      ASSESSMENT:  1. Diabetic ketoacidosis without coma associated with type 1 diabetes mellitus ([address]    2. Type 2 diabetes mellitus without complication, with long-term current use of insulin ([address]    3. Essential hypertension    4. Dyslipidemia    5. Edema, unspecified type        PLAN:    1. Her diabetes hopefully is doing well. Ideally she needs to be on 36 units AC breakfast and 4 units AC dinner. She does not check her sugars before dinner but mainly at 8 pm in the evening which is postprandial.  She does check a fasting. I contemplated switching her to a basal insulin with a GLP1 but she cannot take this because of her history of ketoacidosis which suggests that she might be a type 1 diabetic. I am not going to check ARVIND or IAA antibodies at this point. 2. Blood pressure is elevated because she has not been taking her antihypertensive medication and she was told therefore to resume this. 3. She has had no cardiac problems with no evidence of congestive heart failure during her hospital stay. 4. Lipids will be checked on her return visit. .  Orders Placed This Encounter    BD INSULIN PEN NEEDLE UF SHORT 31 gauge x 5/16\" ndle         Follow-up Disposition:  Return in about 4 weeks (around 7/10/2017).       Feliz Suggs MD

## 2017-06-12 NOTE — PROGRESS NOTES
1. Have you been to the ER, urgent care clinic since your last visit? Hospitalized since your last visit? Yes Where: [facility]    2. Have you seen or consulted any other health care providers outside of the [address] [place] [address] since your last visit? Include any pap smears or colon screening.  Yes Reason for visit: Diabetes

## 2017-06-12 NOTE — MR AVS SNAPSHOT
Visit Information Date & Time Provider Department Dept. Phone Encounter #  
 6/12/2017  1:00 PM Ginger Alert, MD [facility] [phone] [id number] Your Appointments 8/29/2017  2:30 PM  
Any with Ginger Alert, MD  
[address] [place] [address] and Primary Care [facility]) Appt Note: 3 mo f/up Milag[address] [place]  
  
   
 [phone] Upcoming Health Maintenance Date Due  
 FOOT EXAM Q1 5/8/2016 EYE EXAM RETINAL OR DILATED Q1 5/8/2016 GLAUCOMA SCREENING Q2Y 5/8/2017 MICROALBUMIN Q1 7/14/2017 LIPID PANEL Q1 7/14/2017 MEDICARE YEARLY EXAM 7/15/2017 INFLUENZA AGE 9 TO ADULT 8/1/2017 Pneumococcal 65+ Low/Medium Risk (2 of 2 - PPSV23) 11/11/2017 HEMOGLOBIN A1C Q6M 12/2/2017 DTaP/Tdap/Td series (2 - Td) 2/2/2027 Allergies as of 6/12/2017  Review Complete On: 6/12/2017 By: Mona [place] No Known Allergies Current Immunizations  Reviewed on 11/15/2012 Name Date Influenza Vaccine Split  Deferred (Patient Refused) Pneumococcal Vaccine (Unspecified Type) 11/11/2012 12:57 PM  
  
 Not reviewed this visit Vitals BP Pulse Temp Resp Height(growth percentile) Weight(growth percentile) 156/67 (BP 1 Location: Left arm, BP Patient Position: Sitting) 76 97.7 °F (36.5 °C) (Oral) 15 5' 3\" (1.6 m) 174 lb 14.4 oz (79.3 kg) SpO2 BMI OB Status Smoking Status 98% 30.98 kg/m2 Hysterectomy Never Smoker Vitals History BMI and BSA Data Body Mass Index Body Surface Area 30.98 kg/m 2 1.88 m 2 Preferred Pharmacy Pharmacy Name Phone [id number] [address] [phone] Your Updated Medication List  
  
   
This list is accurate as of: 6/12/17  2:42 PM.  Always use your most recent med list. amLODIPine 10 mg tablet Commonly known as:  Jam Palmer TAKE 1 TABLET BY MOUTH EVERY MORNING  
  
 BD INSULIN PEN NEEDLE UF SHORT 31 gauge x 5/16\" Ndle Generic drug:  Insulin Needles (Disposable) USE AS DIRECTED TWICE A DAY  
  
 brimonidine 0.15 % ophthalmic solution Commonly known as:  ALPHAGAN  
  
 clopidogrel 75 mg Tab Commonly known as:  PLAVIX Take 1 Tab by mouth daily. fenofibrate nanocrystallized 145 mg tablet Commonly known as:  TRICOR  
TAKE 1 TABLET BY MOUTH EVERY DAY  
  
 insulin aspart protamine/insulin aspart 100 unit/mL (70-30) injection Commonly known as:  NovoLOG Mix 70-30  
36 Units by SubCUTAneous route Daily (before breakfast). Insulin Syringe-Needle U-100 1/2 mL 30 gauge x 1/2\" Syrg Commonly known as:  BD INSULIN SYRINGE ULTRA-FINE Use as directed daily. E11.9  
  
 latanoprost 0.005 % ophthalmic solution Commonly known as:  Laura Proffer Administer 1 Drop to both eyes nightly. levothyroxine 150 mcg tablet Commonly known as:  SYNTHROID  
TAKE 1 TABLET BY MOUTH EVERY DAY  
  
 losartan-hydroCHLOROthiazide 100-25 mg per tablet Commonly known as:  HYZAAR  
TAKE 1 TABLET ONCE A DAY ORALLY Introducing [facility]! Solange Estrada introduces [employer] patient portal. Now you can access parts of your medical record, email your doctor's office, and request medication refills online. 1. In your internet browser, go to [email or web address] 2. Click on the First Time User? Click Here link in the Sign In box. You will see the New Member Sign Up page. 3. Enter your [employer] Access Code exactly as it appears below. You will not need to use this code after youve completed the sign-up process. If you do not sign up before the expiration date, you must request a new code. · [employer] Access Code: [email or web address] Expires: 8/2/2017  4:23 PM 
 
4. Enter the last four digits of your Social Security Number (xxxx) and Date of Birth (mm/dd/yyyy) as indicated and click Submit.  You will be taken to the next sign-up page. 5. Create a [employer] ID. This will be your [employer] login ID and cannot be changed, so think of one that is secure and easy to remember. 6. Create a [employer] password. You can change your password at any time. 7. Enter your Password Reset Question and Answer. This can be used at a later time if you forget your password. 8. Enter your e-mail address. You will receive e-mail notification when new information is available in [address] 9. Click Sign Up. You can now view and download portions of your medical record. 10. Click the Download Summary menu link to download a portable copy of your medical information. If you have questions, please visit the Frequently Asked Questions section of the [employer] website. Remember, [employer] is NOT to be used for urgent needs. For medical emergencies, dial 911. Now available from your iPhone and Android! Please provide this summary of care documentation to your next provider. Your primary care clinician is listed as William Doll. If you have any questions after today's visit, please call [phone].

## 2017-06-15 RX ORDER — LOSARTAN POTASSIUM AND HYDROCHLOROTHIAZIDE 25; 100 MG/1; MG/1
TABLET ORAL
Qty: 30 TAB | Refills: 11 | Status: SHIPPED | OUTPATIENT
Start: 2017-06-15 | End: 2017-09-22 | Stop reason: CLARIF

## 2017-06-16 RX ORDER — CLOPIDOGREL BISULFATE 75 MG/1
75 TABLET ORAL DAILY
Qty: 30 TAB | Refills: 11 | Status: SHIPPED | OUTPATIENT
Start: 2017-06-16 | End: 2018-04-10 | Stop reason: CLARIF

## 2017-06-16 RX ORDER — FENOFIBRATE 145 MG/1
TABLET, COATED ORAL
Qty: 30 TAB | Refills: 11 | Status: SHIPPED | OUTPATIENT
Start: 2017-06-16 | End: 2018-04-10 | Stop reason: SDUPTHER

## 2017-06-22 ENCOUNTER — PATIENT OUTREACH (OUTPATIENT)
Dept: INTERNAL MEDICINE CLINIC | Age: 77
End: 2017-06-22

## 2017-07-25 ENCOUNTER — OFFICE VISIT (OUTPATIENT)
Dept: INTERNAL MEDICINE CLINIC | Age: 77
End: 2017-07-25

## 2017-07-25 VITALS
TEMPERATURE: 97.9 F | HEIGHT: 63 IN | DIASTOLIC BLOOD PRESSURE: 62 MMHG | BODY MASS INDEX: 30.33 KG/M2 | OXYGEN SATURATION: 97 % | HEART RATE: 76 BPM | WEIGHT: 171.2 LBS | SYSTOLIC BLOOD PRESSURE: 115 MMHG

## 2017-07-25 DIAGNOSIS — Z79.4 TYPE 2 DIABETES MELLITUS WITHOUT COMPLICATION, WITH LONG-TERM CURRENT USE OF INSULIN (HCC): Primary | ICD-10-CM

## 2017-07-25 DIAGNOSIS — E03.2 HYPOTHYROIDISM DUE TO MEDICATION: ICD-10-CM

## 2017-07-25 DIAGNOSIS — I10 ESSENTIAL HYPERTENSION: ICD-10-CM

## 2017-07-25 DIAGNOSIS — E11.9 TYPE 2 DIABETES MELLITUS WITHOUT COMPLICATION, WITH LONG-TERM CURRENT USE OF INSULIN (HCC): Primary | ICD-10-CM

## 2017-07-25 DIAGNOSIS — E10.10 DIABETIC KETOACIDOSIS WITHOUT COMA ASSOCIATED WITH TYPE 1 DIABETES MELLITUS (HCC): ICD-10-CM

## 2017-07-25 DIAGNOSIS — E78.5 DYSLIPIDEMIA: ICD-10-CM

## 2017-07-25 NOTE — MR AVS SNAPSHOT
Visit Information Date & Time Provider Department Dept. Phone Encounter #  
 7/25/2017  3:30 PM Margarito Higginbotham MD [facility] [phone] [id number] Your Appointments 8/29/2017  2:30 PM  
Any with Margarito Higginbotham MD  
[address] [facility]) Appt Note: 3 mo f/up [address] [facility] [place]  
  
   
 [address] [id number]  
  
    
 9/26/2017  3:30 PM  
Any with Margarito Higginbotham MD  
[address] [facility]) Appt Note: 2 month follow up  
 [address] [facility]  
[phone] Upcoming Health Maintenance Date Due  
 FOOT EXAM Q1 5/8/2016 EYE EXAM RETINAL OR DILATED Q1 5/8/2016 GLAUCOMA SCREENING Q2Y 5/8/2017 MICROALBUMIN Q1 7/14/2017 LIPID PANEL Q1 7/14/2017 MEDICARE YEARLY EXAM 7/15/2017 INFLUENZA AGE 9 TO ADULT 8/1/2017 Pneumococcal 65+ Low/Medium Risk (2 of 2 - PPSV23) 11/11/2017 HEMOGLOBIN A1C Q6M 12/2/2017 DTaP/Tdap/Td series (2 - Td) 2/2/2027 Allergies as of 7/25/2017  Review Complete On: 7/25/2017 By: Luke Manley No Known Allergies Current Immunizations  Reviewed on 11/15/2012 Name Date Influenza Vaccine Split  Deferred (Patient Refused) ZZZ-RETIRED (DO NOT USE) Pneumococcal Vaccine (Unspecified Type) 11/11/2012 12:57 PM  
  
 Not reviewed this visit Vitals BP Pulse Temp Height(growth percentile) Weight(growth percentile) SpO2  
 115/62 (BP 1 Location: Left arm, BP Patient Position: Sitting) 76 97.9 °F (36.6 °C) (Oral) 5' 3\" (1.6 m) 171 lb 3.2 oz (77.7 kg) 97% BMI OB Status Smoking Status 30.33 kg/m2 Hysterectomy Never Smoker BMI and BSA Data Body Mass Index Body Surface Area  
 30.33 kg/m 2 1.86 m 2 Preferred Pharmacy Pharmacy Name Phone  [id number] [phone] Your Updated Medication List  
  
   
This list is accurate as of: 7/25/17  4:48 PM.  Always use your most recent med list. amLODIPine 10 mg tablet Commonly known as:  Vaughn Robertss TAKE 1 TABLET BY MOUTH EVERY MORNING  
  
 BD INSULIN PEN NEEDLE UF SHORT 31 gauge x 5/16\" Ndle Generic drug:  Insulin Needles (Disposable) USE AS DIRECTED TWICE A DAY  
  
 brimonidine 0.15 % ophthalmic solution Commonly known as:  Daysi Squibb Administer 1 Drop to both eyes two (2) times a day. clopidogrel 75 mg Tab Commonly known as:  PLAVIX Take 1 Tab by mouth daily. fenofibrate nanocrystallized 145 mg tablet Commonly known as:  TRICOR  
TAKE 1 TABLET BY MOUTH EVERY DAY  
  
 insulin aspart protamine/insulin aspart 100 unit/mL (70-30) injection Commonly known as:  NovoLOG Mix 70-30  
36 Units by SubCUTAneous route Daily (before breakfast). Insulin Syringe-Needle U-100 1/2 mL 30 gauge x 1/2\" Syrg Commonly known as:  BD INSULIN SYRINGE ULTRA-FINE Use as directed daily. E11.9  
  
 latanoprost 0.005 % ophthalmic solution Commonly known as:  Manolo Rase Administer 1 Drop to both eyes nightly. levothyroxine 150 mcg tablet Commonly known as:  SYNTHROID  
TAKE 1 TABLET BY MOUTH EVERY DAY  
  
 losartan-hydroCHLOROthiazide 100-25 mg per tablet Commonly known as:  HYZAAR  
TAKE 1 TABLET ONCE A DAY ORALLY Introducing hospitals & HEALTH SERVICES! [facility] introduces Wordlock patient portal. Now you can access parts of your medical record, email your doctor's office, and request medication refills online. 1. In your internet browser, go to [email or web address] [email or web address] 2. Click on the First Time User? Click Here link in the Sign In box. You will see the New Member Sign Up page. 3. Enter your Wordlock Access Code exactly as it appears below. You will not need to use this code after youve completed the sign-up process.  If you do not sign up before the expiration date, you must request a new code. · Kamego Access Code: [email or web address] Expires: 8/2/2017  4:23 PM 
 
4. Enter the last four digits of your Social Security Number (xxxx) and Date of Birth (mm/dd/yyyy) as indicated and click Submit. You will be taken to the next sign-up page. 5. Create a Kamego ID. This will be your Kamego login ID and cannot be changed, so think of one that is secure and easy to remember. 6. Create a Kamego password. You can change your password at any time. 7. Enter your Password Reset Question and Answer. This can be used at a later time if you forget your password. 8. Enter your e-mail address. You will receive e-mail notification when new information is available in [address] 9. Click Sign Up. You can now view and download portions of your medical record. 10. Click the Download Summary menu link to download a portable copy of your medical information. If you have questions, please visit the Frequently Asked Questions section of the Kamego website. Remember, Kamego is NOT to be used for urgent needs. For medical emergencies, dial 911. Now available from your iPhone and Android! Please provide this summary of care documentation to your next provider. Your primary care clinician is listed as William Doll. If you have any questions after today's visit, please call [phone].

## 2017-07-26 NOTE — PROGRESS NOTES
[address] [facility]  [facility]  [address] [place] [address]  Phone:  [phone]  Fax: [phone]       Chief Complaint   Patient presents with   [facility] Wellness Visit   . SUBJECTIVE:    Leon Denis is a 77 y.o. female Comes in for return visit saying that she is doing well. Blood sugars remain quite high. She is very vague about giving me a fasting blood sugars, but it appears that they vary between 150 and 200 plus. She takes her 36 units of premixed Insulin a.c. breakfast, and 10 units a.c. dinner depending on how high the blood sugar is. The only problem with the a.c. dinner sugar is she only checks her sugars before dinner maybe three times a week. She is preoccupied unfortunately with hypoglycemia and as a result has very loose diabetic control. I asked her on multiple occasions to consider switching to a more safer regimen, but she declines. She has a past history of primary hypertension, dyslipidemia and hypothyroidism. Current Outpatient Prescriptions   Medication Sig Dispense Refill    clopidogrel (PLAVIX) 75 mg tab Take 1 Tab by mouth daily. 30 Tab 11    fenofibrate nanocrystallized (TRICOR) 145 mg tablet TAKE 1 TABLET BY MOUTH EVERY DAY 30 Tab 11    losartan-hydroCHLOROthiazide (HYZAAR) 100-25 mg per tablet TAKE 1 TABLET ONCE A DAY ORALLY 30 Tab 11    BD INSULIN PEN NEEDLE UF SHORT 31 gauge x 5/16\" ndle USE AS DIRECTED TWICE A DAY  11    insulin aspart protamine/insulin aspart (NOVOLOG MIX 70-30) 100 unit/mL (70-30) injection 36 Units by SubCUTAneous route Daily (before breakfast). 10 mL 0    levothyroxine (SYNTHROID) 150 mcg tablet TAKE 1 TABLET BY MOUTH EVERY DAY 30 Tab 11    amLODIPine (NORVASC) 10 mg tablet TAKE 1 TABLET BY MOUTH EVERY MORNING 30 Tab 11    Insulin Syringe-Needle U-100 (BD INSULIN SYRINGE ULTRA-FINE) 1/2 mL 30 gauge x 1/2\" syrg Use as directed daily. E11.9 100 Syringe 11    brimonidine (ALPHAGAN) 0.15 % ophthalmic solution Administer 1 Drop to both eyes two (2) times a day.  latanoprost (XALATAN) 0.005 % ophthalmic solution Administer 1 Drop to both eyes nightly.          Past Medical History:   Diagnosis Date    Diabetes [facility]     Heart failure ([facility]     unknown to family    Hypercholesteremia     Hypertension     Stroke ([facility]     Thyroid disease      Past Surgical History:   Procedure Laterality Date    HX GYN      HX HEENT      thyroidectomy    HX HYSTERECTOMY      REMOVAL GALLBLADDER      THYROIDECTOMY       No Known Allergies      REVIEW OF SYSTEMS:  General: negative for - chills or fever  ENT: negative for - headaches, nasal congestion or tinnitus  Respiratory: negative for - cough, hemoptysis, shortness of breath or wheezing  Cardiovascular : negative for - chest pain, edema, palpitations or shortness of breath  Gastrointestinal: negative for - abdominal pain, blood in stools, heartburn or nausea/vomiting  Genito-Urinary: no dysuria, trouble voiding, or hematuria  Musculoskeletal: negative for - gait disturbance, joint pain, joint stiffness or joint swelling  Neurological: no TIA or stroke symptoms  Hematologic: no bruises, no bleeding, no swollen glands  Integument: no lumps, mole changes, nail changes or rash  Endocrine: no malaise/lethargy or unexpected weight changes      Social History     Social History    Marital status:      Spouse name: N/A    Number of children: 1    Years of education: N/A     Occupational History    retired      Social History Main Topics    Smoking status: Never Smoker    Smokeless tobacco: Never Used    Alcohol use Yes      Comment: been years    Drug use: No    Sexual activity: Not Currently     Other Topics Concern    None     Social History Narrative     Family History   Problem Relation Age of Onset    Diabetes Father     No Known Problems Mother        OBJECTIVE:    Visit Vitals    /62 (BP 1 Location: Left arm, BP Patient Position: Sitting)    Pulse 76    Temp 97.9 °F (36.6 °C) (Oral)    Ht 5' 3\" (1.6 m)    Wt 171 lb 3.2 oz (77.7 kg)    SpO2 97%    BMI 30.33 kg/m2     CONSTITUTIONAL: well , well nourished, appears age appropriate  EYES: perrla, eom intact  ENMT:moist mucous membranes, pharynx clear  NECK: supple. Thyroid normal  RESPIRATORY: Chest: clear to ascultation and percussion   CARDIOVASCULAR: Heart: regular rate and rhythm  GASTROINTESTINAL: Abdomen: soft, bowel sounds active  HEMATOLOGIC: no pathological lymph nodes palpated  MUSCULOSKELETAL: Extremities: no edema, pulse 1+   INTEGUMENT: No unusual rashes or suspicious skin lesions noted. Nails appear normal.  NEUROLOGIC: non-focal exam   MENTAL STATUS: alert and oriented, appropriate affect      ASSESSMENT:  1. Type 2 diabetes mellitus without complication, with long-term current use of insulin ([address]    2. Diabetic ketoacidosis without coma associated with type 1 diabetes mellitus ([address]    3. Essential hypertension    4. Dyslipidemia    5. Hypothyroidism due to medication        PLAN:    1. The patient's diabetes is not well controlled. I suspect she a 2B diabetic anyway in view of her history of having had two episodes and possibly diabetic ketoacidosis. 2. Blood pressure is excellent today. 3. She will continue statin as prescribed in view of her increased cardiovascular risk. 4. We also may continue her thyroid supplement. Her last TSH was excellent. Follow-up Disposition:  Return in about 2 months (around 9/25/2017).       Julieta Lopez MD

## 2017-07-30 ENCOUNTER — HOSPITAL ENCOUNTER (EMERGENCY)
Age: 77
Discharge: HOME | End: 2017-07-30
Attending: EMERGENCY MEDICINE
Payer: MEDICARE

## 2017-07-30 VITALS
TEMPERATURE: 97.5 F | DIASTOLIC BLOOD PRESSURE: 58 MMHG | OXYGEN SATURATION: 99 % | BODY MASS INDEX: 30.29 KG/M2 | HEART RATE: 72 BPM | SYSTOLIC BLOOD PRESSURE: 153 MMHG | RESPIRATION RATE: 19 BRPM | WEIGHT: 171 LBS

## 2017-07-30 DIAGNOSIS — E16.2 HYPOGLYCEMIA: Primary | ICD-10-CM

## 2017-07-30 LAB
ALBUMIN SERPL BCP-MCNC: 3.4 G/DL (ref 3.5–5)
ALBUMIN/GLOB SERPL: 1 {RATIO} (ref 1.1–2.2)
ALP SERPL-CCNC: 59 U/L (ref 45–117)
ALT SERPL-CCNC: 24 U/L (ref 12–78)
ANION GAP BLD CALC-SCNC: 6 MMOL/L (ref 5–15)
APPEARANCE UR: CLEAR
AST SERPL W P-5'-P-CCNC: 19 U/L (ref 15–37)
BACTERIA URNS QL MICRO: NEGATIVE /HPF
BASOPHILS # BLD AUTO: 0 K/UL (ref 0–0.1)
BASOPHILS # BLD: 0 % (ref 0–1)
BILIRUB SERPL-MCNC: 0.2 MG/DL (ref 0.2–1)
BILIRUB UR QL: NEGATIVE
BUN SERPL-MCNC: 28 MG/DL (ref 6–20)
BUN/CREAT SERPL: 29 (ref 12–20)
CALCIUM SERPL-MCNC: 8.3 MG/DL (ref 8.5–10.1)
CHLORIDE SERPL-SCNC: 104 MMOL/L (ref 97–108)
CO2 SERPL-SCNC: 28 MMOL/L (ref 21–32)
COLOR UR: ABNORMAL
CREAT SERPL-MCNC: 0.96 MG/DL (ref 0.55–1.02)
DIFFERENTIAL METHOD BLD: ABNORMAL
EOSINOPHIL # BLD: 0 K/UL (ref 0–0.4)
EOSINOPHIL NFR BLD: 1 % (ref 0–7)
EPITH CASTS URNS QL MICRO: ABNORMAL /LPF
ERYTHROCYTE [DISTWIDTH] IN BLOOD BY AUTOMATED COUNT: 13.2 % (ref 11.5–14.5)
GLOBULIN SER CALC-MCNC: 3.3 G/DL (ref 2–4)
GLUCOSE BLD STRIP.AUTO-MCNC: 165 MG/DL (ref 65–100)
GLUCOSE BLD STRIP.AUTO-MCNC: 182 MG/DL (ref 65–100)
GLUCOSE SERPL-MCNC: 110 MG/DL (ref 65–100)
GLUCOSE UR STRIP.AUTO-MCNC: 100 MG/DL
HCT VFR BLD AUTO: 34.6 % (ref 35–47)
HGB BLD-MCNC: 12 G/DL (ref 11.5–16)
HGB UR QL STRIP: NEGATIVE
HYALINE CASTS URNS QL MICRO: ABNORMAL /LPF (ref 0–5)
KETONES UR QL STRIP.AUTO: NEGATIVE MG/DL
LEUKOCYTE ESTERASE UR QL STRIP.AUTO: NEGATIVE
LYMPHOCYTES # BLD AUTO: 13 % (ref 12–49)
LYMPHOCYTES # BLD: 0.6 K/UL (ref 0.8–3.5)
MCH RBC QN AUTO: 32.2 PG (ref 26–34)
MCHC RBC AUTO-ENTMCNC: 34.7 G/DL (ref 30–36.5)
MCV RBC AUTO: 92.8 FL (ref 80–99)
MONOCYTES # BLD: 0.4 K/UL (ref 0–1)
MONOCYTES NFR BLD AUTO: 9 % (ref 5–13)
NEUTS SEG # BLD: 3.6 K/UL (ref 1.8–8)
NEUTS SEG NFR BLD AUTO: 77 % (ref 32–75)
NITRITE UR QL STRIP.AUTO: NEGATIVE
PH UR STRIP: 6.5 [PH] (ref 5–8)
PLATELET # BLD AUTO: 300 K/UL (ref 150–400)
POTASSIUM SERPL-SCNC: 3.6 MMOL/L (ref 3.5–5.1)
PROT SERPL-MCNC: 6.7 G/DL (ref 6.4–8.2)
PROT UR STRIP-MCNC: NEGATIVE MG/DL
RBC # BLD AUTO: 3.73 M/UL (ref 3.8–5.2)
RBC #/AREA URNS HPF: ABNORMAL /HPF (ref 0–5)
RBC MORPH BLD: ABNORMAL
SERVICE CMNT-IMP: ABNORMAL
SERVICE CMNT-IMP: ABNORMAL
SODIUM SERPL-SCNC: 138 MMOL/L (ref 136–145)
SP GR UR REFRACTOMETRY: 1.02 (ref 1–1.03)
UROBILINOGEN UR QL STRIP.AUTO: 1 EU/DL (ref 0.2–1)
WBC # BLD AUTO: 4.6 K/UL (ref 3.6–11)
WBC URNS QL MICRO: ABNORMAL /HPF (ref 0–4)

## 2017-07-30 PROCEDURE — 85025 COMPLETE CBC W/AUTO DIFF WBC: CPT | Performed by: EMERGENCY MEDICINE

## 2017-07-30 PROCEDURE — 99285 EMERGENCY DEPT VISIT HI MDM: CPT

## 2017-07-30 PROCEDURE — 81001 URINALYSIS AUTO W/SCOPE: CPT | Performed by: EMERGENCY MEDICINE

## 2017-07-30 PROCEDURE — 36415 COLL VENOUS BLD VENIPUNCTURE: CPT | Performed by: EMERGENCY MEDICINE

## 2017-07-30 PROCEDURE — 80053 COMPREHEN METABOLIC PANEL: CPT | Performed by: EMERGENCY MEDICINE

## 2017-07-30 PROCEDURE — 82962 GLUCOSE BLOOD TEST: CPT

## 2017-07-31 ENCOUNTER — PATIENT OUTREACH (OUTPATIENT)
Dept: INTERNAL MEDICINE CLINIC | Age: 77
End: 2017-07-31

## 2017-07-31 NOTE — ED NOTES
Pt arrived via EMS and bedside report obtained. Assumed care of pt at this time. EMS reports that they received a phone call from the pt's son due to hypoglycemia. EMS reports that they frequently visit the pt's house, are usually able to administer an amp of D50, and then the pt refuses transport. However, IV attempts were unsuccessful at first today, and pt became combative. EMS obtained IV access and administered 1 amp D50 with improvement of BG to 182. Pt alert and oriented upon arrival to ED. Pt's son at bedside. Pt resting comfortably in bed with side rails up and call bell with within reach. Pt aware of plan to await for MD/PA-C assessment, and pt/family verbalizes understanding. Placed on Monitor x 3 with alarms on.

## 2017-07-31 NOTE — DISCHARGE INSTRUCTIONS
Learning About Low Blood Sugar (Hypoglycemia) in Diabetes  What is low blood sugar (hypoglycemia)? Hypoglycemia means that your blood sugar is low and your body (especially your brain) is not getting enough fuel. If you have diabetes, your blood sugar can go too low if you take too much of some diabetes medicines. It can also go too low if you miss a meal. And it can happen if you exercise too hard without eating enough food. Some medicines used to treat other health problems can cause low blood sugar too. What are the symptoms? Symptoms of low blood sugar can start quickly. It may take just 10 to 15 minutes. If you have had diabetes for many years, you may not realize that your blood sugar is low until it drops very low. · If your blood sugar level drops below 70 (mild low blood sugar), you may feel tired, anxious, dizzy, weak, shaky, or sweaty. You may have a fast heartbeat or blurry vision. · If your blood sugar level continues to drop (usually below 40), your behavior may change. You may feel more irritable. You may find it hard to concentrate or talk. And you may feel unsteady when you stand or walk. You may become too weak or confused to eat something with sugar to raise your blood sugar level. · If your blood sugar level drops very low (usually below 20), you may pass out (lose consciousness). Or you may have a seizure or stroke. If you have symptoms of severe low blood sugar, you need to get medical care right away. If you had a low blood sugar level during the night, you may wake up tired or with a headache. Or you may sweat so much during the night that your pajamas or sheets are damp when you wake up. How is low blood sugar treated? You can treat low blood sugar by eating or drinking something that has 15 grams of carbohydrate. These should be quick-sugar foods.  Check your blood sugar level again 15 minutes after having a quick-sugar food to make sure your level is getting back to your target range. Here are examples of quick-sugar foods that have 15 grams of carbohydrate:  · 3 to 4 glucose tablets  · 1 tube of glucose gel  · Hard candy (such as 3 Jolly Ranchers or 5 to 7 Life Savers)  · 1 tablespoon honey  · 2 tablespoons of raisins  · ½ cup to ¾ cup (4 to 6 ounces) of fruit juice or regular (not diet) soda  · 1 tablespoon of sugar  · 1 cup of fat-free milk  If you have problems with severe low blood sugar, someone else may have to give you a shot of glucagon. This is a hormone that raises blood sugar levels quickly. How can you prevent low blood sugar? You can take steps to prevent low blood sugar. · Follow your treatment plan. Take your insulin or other diabetes medicine exactly as your doctor prescribed it. Talk with your doctor if you're having low blood sugar often. Your medicine may need to be adjusted if it's causing your low blood sugar. · Check your blood sugar levels often. This helps you find early changes before an emergency happens. · Keep a quick-sugar food with you in case your blood sugar level drops low. · Eat small meals more often so that you don't get too hungry between meals. Don't skip meals. · Balance extra exercise with eating more. Check your blood sugar and learn how it changes after exercise. If your blood sugar stays at a normal level, you may not need to eat after you exercise. · Limit how much alcohol you drink. Alcohol can make low blood sugar go even lower. Don't drink alcohol if you have problems recognizing the early signs of low blood sugar. · Keep a diary of your symptoms. This helps you learn when changes in your body may signal low blood sugar. And keep track of how often you have low blood sugar, including when you last ate and what you ate. This will help you learn what causes your blood sugar to drop. · Learn about diabetes and low blood sugar.  Support groups or a diabetes education center can help you understand how medicines, diet, and exercise affect your blood sugar levels. Since low blood sugar levels can quickly become an emergency, be sure to wear medical alert jewelry, such as a medical alert bracelet. This is to let people know you have diabetes so they can get help for you. You can buy this at most drugstores. And make sure your family, friends, and coworkers know the symptoms of low blood sugar. Teach them what to do to get your sugar level up. Follow-up care is a key part of your treatment and safety. Be sure to make and go to all appointments, and call your doctor if you are having problems. It's also a good idea to know your test results and keep a list of the medicines you take. Where can you learn more? Go to http://toi-sunil.info/. Enter E165 in the search box to learn more about \"Learning About Low Blood Sugar (Hypoglycemia) in Diabetes. \"  Current as of: March 13, 2017  Content Version: 11.3  © 0123-9268 [employer]. Care instructions adapted under license by [employer] (which disclaims liability or warranty for this information). If you have questions about a medical condition or this instruction, always ask your healthcare professional. [address] any warranty or liability for your use of this information.

## 2017-07-31 NOTE — ED PROVIDER NOTES
HPI Comments: Bayron Jimenez is a 77 y.o. female with PMHx significant for DM and HTN, who presents via EMS to [facility] ED for evaluation concerning low blood sugar of 46. Per the pt's son, pt was \"moving funny in her chair\". She does not recall her blood sugar being low and notes that it was last low about a month ago. She takes 36 units of insulin every morning and had eaten today, but had developed a mild HA which prevented her from continuing to eat. Pt denies any N/V/D, CP, abd pain, SOB, focal weakness or numbness, dysuria, hematuria, or back pain. Blessing Posey MD    Social Hx: - smoking; - EtOH; - illicit drug use    There are no other complains, changes, or physical findings at this time. The history is provided by the patient and a relative. Past Medical History:   Diagnosis Date    Diabetes ([address]     Heart failure ([address]     unknown to family    Hypercholesteremia     Hypertension     [facility]     Thyroid disease        Past Surgical History:   Procedure Laterality Date    HX GYN      HX HEENT      thyroidectomy    HX HYSTERECTOMY      REMOVAL GALLBLADDER      THYROIDECTOMY           Family History:   Problem Relation Age of Onset    Diabetes Father     No Known Problems Mother        Social History     Social History    Marital status:      Spouse name: N/A    Number of children: 1    Years of education: N/A     Occupational History    retired      Social History Main Topics    Smoking status: Never Smoker    Smokeless tobacco: Never Used    Alcohol use Yes      Comment: been years    Drug use: No    Sexual activity: Not Currently     Other Topics Concern    Not on file     Social History Narrative         ALLERGIES: Review of patient's allergies indicates no known allergies. Review of Systems   Constitutional: Negative for chills and fever.        + low blood sugar   HENT: Negative for congestion. Eyes: Negative for visual disturbance. Respiratory: Negative for chest tightness and shortness of breath. Cardiovascular: Negative for chest pain and leg swelling. Gastrointestinal: Negative for abdominal pain, diarrhea, nausea and vomiting. Endocrine: Negative for polyuria. Genitourinary: Negative for dysuria, frequency and hematuria. Musculoskeletal: Negative for back pain and myalgias. Skin: Negative for color change. Allergic/Immunologic: Negative for immunocompromised state. Neurological: Positive for headaches. Negative for numbness. Patient Vitals for the past 12 hrs:   Temp Pulse Resp BP SpO2   07/30/17 2100 - 72 19 146/63 99 %   07/30/17 2045 - 77 18 139/62 100 %   07/30/17 2015 - 81 24 142/62 100 %   07/30/17 2006 97.5 °F (36.4 °C) 84 19 144/64 100 %       Physical Exam     Nursing note and vitals reviewed. General appearance: non-toxic, NAD  Eyes: PERRL, EOMI, conjunctiva normal, anicteric sclera  HEENT: mucous membranes slightly tacky, oropharynx is clear. Upper dentures noted. Pulmonary: clear to auscultation bilaterally  Cardiac: normal rate and regular rhythm, 2/6 systolic murmurs on the left sternal border, gallops, or rubs, 2+DP pulses, 2+ radial pulses  Abdomen: soft, nontender, nondistended, bowel sounds present  MSK: no pre-tibial edema  Neuro: Alert, answers questions appropriately. 5/5 strength in lower extremities.  is symmetric. CN II-XII intact. No focal neurologic deficits. Skin: capillary refill brisk    MDM  Number of Diagnoses or Management Options  Hypoglycemia:   Diagnosis management comments: DDx: poor dietary intake today/insufficient glucose, insulin side effect     Glucose remains normal in ED; suspect limited po intake despite normal insulin dosing. Advised pt to continue checking FS; skip PM dose insulin, check FS in AM. Use only 1/2 dose of usual AM dose insulin and f/u with PCP tomorrow.         Amount and/or Complexity of Data Reviewed  Clinical lab tests: ordered and reviewed  Obtain history from someone other than the patient: yes (Son)  Review and summarize past medical records: yes    Patient Progress  Patient progress: stable    ED Course       Procedures    LABORATORY TESTS:  Recent Results (from the past 12 hour(s))   GLUCOSE, POC    Collection Time: 07/30/17  8:01 PM   Result Value Ref Range    Glucose (POC) 182 (H) 65 - 100 mg/dL    Performed by Jacques Eastman    CBC WITH AUTOMATED DIFF    Collection Time: 07/30/17  9:07 PM   Result Value Ref Range    WBC 4.6 3.6 - 11.0 K/uL    RBC 3.73 (L) 3.80 - 5.20 M/uL    HGB 12.0 11.5 - 16.0 g/dL    HCT 34.6 (L) 35.0 - 47.0 %    MCV 92.8 80.0 - 99.0 FL    MCH 32.2 26.0 - 34.0 PG    MCHC 34.7 30.0 - 36.5 g/dL    RDW 13.2 11.5 - 14.5 %    PLATELET 420 916 - 103 K/uL    NEUTROPHILS 77 (H) 32 - 75 %    LYMPHOCYTES 13 12 - 49 %    MONOCYTES 9 5 - 13 %    EOSINOPHILS 1 0 - 7 %    BASOPHILS 0 0 - 1 %    ABS. NEUTROPHILS 3.6 1.8 - 8.0 K/UL    ABS. LYMPHOCYTES 0.6 (L) 0.8 - 3.5 K/UL    ABS. MONOCYTES 0.4 0.0 - 1.0 K/UL    ABS. EOSINOPHILS 0.0 0.0 - 0.4 K/UL    ABS. BASOPHILS 0.0 0.0 - 0.1 K/UL    DF SMEAR SCANNED      RBC COMMENTS NORMOCYTIC, NORMOCHROMIC     METABOLIC PANEL, COMPREHENSIVE    Collection Time: 07/30/17  9:07 PM   Result Value Ref Range    Sodium 138 136 - 145 mmol/L    Potassium 3.6 3.5 - 5.1 mmol/L    Chloride 104 97 - 108 mmol/L    CO2 28 21 - 32 mmol/L    Anion gap 6 5 - 15 mmol/L    Glucose 110 (H) 65 - 100 mg/dL    BUN 28 (H) 6 - 20 MG/DL    Creatinine 0.96 0.55 - 1.02 MG/DL    BUN/Creatinine ratio 29 (H) 12 - 20      GFR est AA >60 >60 ml/min/1.73m2    GFR est non-AA 57 (L) >60 ml/min/1.73m2    Calcium 8.3 (L) 8.5 - 10.1 MG/DL    Bilirubin, total 0.2 0.2 - 1.0 MG/DL    ALT (SGPT) 24 12 - 78 U/L    AST (SGOT) 19 15 - 37 U/L    Alk.  phosphatase 59 45 - 117 U/L    Protein, total 6.7 6.4 - 8.2 g/dL    Albumin 3.4 (L) 3.5 - 5.0 g/dL    Globulin 3.3 2.0 - 4.0 g/dL    A-G Ratio 1.0 (L) 1.1 - 2.2     URINALYSIS W/MICROSCOPIC Collection Time: 07/30/17  9:24 PM   Result Value Ref Range    Color YELLOW/STRAW      Appearance CLEAR CLEAR      Specific gravity 1.019 1.003 - 1.030      pH (UA) 6.5 5.0 - 8.0      Protein NEGATIVE  NEG mg/dL    Glucose 100 (A) NEG mg/dL    Ketone NEGATIVE  NEG mg/dL    Bilirubin NEGATIVE  NEG      Blood NEGATIVE  NEG      Urobilinogen 1.0 0.2 - 1.0 EU/dL    Nitrites NEGATIVE  NEG      Leukocyte Esterase NEGATIVE  NEG      WBC 0-4 0 - 4 /hpf    RBC 0-5 0 - 5 /hpf    Epithelial cells FEW FEW /lpf    Bacteria NEGATIVE  NEG /hpf    Hyaline cast 0-2 0 - 5 /lpf   GLUCOSE, POC    Collection Time: 07/30/17 10:36 PM   Result Value Ref Range    Glucose (POC) 165 (H) 65 - 100 mg/dL    Performed by Johana Hill        MEDICATIONS GIVEN:  Medications - No data to display    IMPRESSION:  1. Hypoglycemia        PLAN:  1. Current Discharge Medication List      CONTINUE these medications which have NOT CHANGED    Details   clopidogrel (PLAVIX) 75 mg tab Take 1 Tab by mouth daily. Qty: 30 Tab, Refills: 11      fenofibrate nanocrystallized (TRICOR) 145 mg tablet TAKE 1 TABLET BY MOUTH EVERY DAY  Qty: 30 Tab, Refills: 11      losartan-hydroCHLOROthiazide (HYZAAR) 100-25 mg per tablet TAKE 1 TABLET ONCE A DAY ORALLY  Qty: 30 Tab, Refills: 11      BD INSULIN PEN NEEDLE UF SHORT 31 gauge x 5/16\" ndle USE AS DIRECTED TWICE A DAY  Refills: 11      insulin aspart protamine/insulin aspart (NOVOLOG MIX 70-30) 100 unit/mL (70-30) injection 36 Units by SubCUTAneous route Daily (before breakfast). Qty: 10 mL, Refills: 0      levothyroxine (SYNTHROID) 150 mcg tablet TAKE 1 TABLET BY MOUTH EVERY DAY  Qty: 30 Tab, Refills: 11      amLODIPine (NORVASC) 10 mg tablet TAKE 1 TABLET BY MOUTH EVERY MORNING  Qty: 30 Tab, Refills: 11      brimonidine (ALPHAGAN) 0.15 % ophthalmic solution Administer 1 Drop to both eyes two (2) times a day. latanoprost (XALATAN) 0.005 % ophthalmic solution Administer 1 Drop to both eyes nightly. 2.   Follow-up Information     Follow up With Details Comments [address] [place] Schedule an appointment as soon as possible for a visit in 2 days FOR RE-EVALUATION [place]  [address] [facility]  [phone]      [facility] EMERGENCY DEPT Go in 1 day If symptoms worsen [address]  [address] [facility]  [phone]        Return to ED if worse     DISCHARGE NOTE  10:40 PM  The patient has been re-evaluated and is ready for discharge. Reviewed available results with patient. Counseled patient on diagnosis and care plan. Patient has expressed understanding, and all questions have been answered. Patient agrees with plan and agrees to follow up as recommended, or return to the ED if their symptoms worsen. Discharge instructions have been provided and explained to the patient, along with reasons to return to the ED. ATTESTATION:  This note is prepared by Dian Partida, acting as Scribe for Chandrika Peña. Renate Lei MD.    Chandrika Peña. Renate Lei MD: The scribe's documentation has been prepared under my direction and personally reviewed by me in its entirety. I confirm that the note above accurately reflects all work, treatment, procedures, and medical decision making performed by me.

## 2017-07-31 NOTE — ED NOTES
Discharge instructions given to patient by Kitty Downing. Ward Miner MD . Pt verbalized understanding of discharge instructions. Pt discharged without difficulty. Pt discharged in stable condition via ambulation.

## 2017-07-31 NOTE — ED NOTES
Pt notes that she sent her family home because she was not sure if she was going to be discharged or not. Informed pt that next time she needs to discuss with RN prior to sending family home. Pt is going to attempt to make phone calls to get a ride at this time.

## 2017-07-31 NOTE — ED NOTES
Pt finished eating. Pt requesting to go home. Will discuss with Sandy Quiroz. Xiomara Downing MD the discharge plan.

## 2017-08-12 ENCOUNTER — PATIENT OUTREACH (OUTPATIENT)
Dept: INTERNAL MEDICINE CLINIC | Age: 77
End: 2017-08-12

## 2017-08-12 NOTE — PROGRESS NOTES
NNTOCED [facility] 2017:  Hypoglycemia      Hospital Discharge Follow-Up      Date/Time:  2017 5:11 PM    Patient listed on discharge [facility]) report on 2017. Patient discharged from [facility] for Hypoglycemia. RRAT score: Low Risk            7       Total Score        3 Has Seen PCP in Last 6 Months (Yes=3, No=0)    4 Charlson Comorbidity Score (Age + Comorbid Conditions)        Criteria that do not apply:    . Living with Significant Other. Assisted Living. LTAC. SNF. or   Rehab    Patient Length of Stay (>5 days = 3)    IP Visits Last 12 Months (1-3=4, 4=9, >4=11)    Pt. Coverage (Medicare=5 , Medicaid, or Self-Pay=4)     Low    Medical History:     Past Medical History:   Diagnosis Date    [facility] ([facility]     Heart failure ([facility]     unknown to family    Hypercholesteremia     Hypertension     Stroke ([facility]     Thyroid disease        Nurse Navigator(NN) contacted the patient by telephone to perform post [id number] [facility] ED discharge assessment. Verified  and address with patient as identifiers. Provided introduction to self, and explanation of the Nurses Navigator role. Diet:   Patient reports: Diabetic Diet    Activity:    Patient reports: somewalking outside the house    Medication:   Performed medication reconciliation with patient, and patient verbalizes understanding of administration of home medications. There were no barriers to obtaining medications identified at this time. Support system:  patient and son    Discharge Instructions :  Reviewed discharge instructions with patient. Patient verbalizes understanding of discharge instructions and follow-up care. Red Flags:  Low blood sugar. S&S hypoglycemia--patient stated she had cooked a ham that day and didn't eat enough, but knows to eat more now. Stated she knows what to do to avoid the problem.       Labs Reviewed:  Glucose 46      IAdvance Care Planning:   Patient was offered the opportunity to discuss advance care planning:  yes     Does patient have an Advance Directive:  no   If no, did you provide information on [facility]? yes     PCP/Specialist follow up: Patient scheduled to follow up with Neda Cabello MD on 8/29/2017. Patient stated she was scheduled for this appt prior to ED visit and thinks she doesn't need to change it. Reviewed red flags with patient, and patient verbalizes understanding. Patient given an opportunity to ask questions. No other clinical/social/functional needs noted. The patient agrees to contact the PCP office for questions related to their healthcare. The patient expressed thanks, offered no additional questions and ended the call. Case management plan: Attempt to contact the patient by telephone or during office visit within the next 7-10 days. Will continue to follow as necessary for the next 30 days. Will reassess for case management needs prior to discharge from case management service on or about 30 days.

## 2017-09-12 RX ORDER — INSULIN ASPART 100 [IU]/ML
36 INJECTION, SUSPENSION SUBCUTANEOUS
Qty: 10 ML | Refills: 11 | Status: SHIPPED | OUTPATIENT
Start: 2017-09-12 | End: 2017-12-21 | Stop reason: CLARIF

## 2017-09-22 RX ORDER — LOSARTAN POTASSIUM AND HYDROCHLOROTHIAZIDE 25; 100 MG/1; MG/1
TABLET ORAL
Qty: 30 TAB | Refills: 11 | Status: SHIPPED | OUTPATIENT
Start: 2017-09-22 | End: 2018-10-11 | Stop reason: SDUPTHER

## 2017-10-12 ENCOUNTER — OFFICE VISIT (OUTPATIENT)
Dept: INTERNAL MEDICINE CLINIC | Age: 77
End: 2017-10-12

## 2017-10-12 VITALS
OXYGEN SATURATION: 98 % | TEMPERATURE: 97.9 F | HEIGHT: 63 IN | BODY MASS INDEX: 30.81 KG/M2 | RESPIRATION RATE: 16 BRPM | WEIGHT: 173.9 LBS | HEART RATE: 88 BPM | SYSTOLIC BLOOD PRESSURE: 165 MMHG | DIASTOLIC BLOOD PRESSURE: 72 MMHG

## 2017-10-12 DIAGNOSIS — Z13.39 SCREENING FOR ALCOHOLISM: ICD-10-CM

## 2017-10-12 DIAGNOSIS — Z79.4 TYPE 2 DIABETES MELLITUS WITHOUT COMPLICATION, WITH LONG-TERM CURRENT USE OF INSULIN (HCC): Primary | ICD-10-CM

## 2017-10-12 DIAGNOSIS — Z13.31 SCREENING FOR DEPRESSION: ICD-10-CM

## 2017-10-12 DIAGNOSIS — E11.9 TYPE 2 DIABETES MELLITUS WITHOUT COMPLICATION, WITH LONG-TERM CURRENT USE OF INSULIN (HCC): Primary | ICD-10-CM

## 2017-10-12 DIAGNOSIS — Z00.00 WELLNESS EXAMINATION: ICD-10-CM

## 2017-10-12 DIAGNOSIS — I10 ESSENTIAL HYPERTENSION: ICD-10-CM

## 2017-10-12 DIAGNOSIS — E78.5 DYSLIPIDEMIA: ICD-10-CM

## 2017-10-12 DIAGNOSIS — E03.2 HYPOTHYROIDISM DUE TO MEDICATION: ICD-10-CM

## 2017-10-12 RX ORDER — INSULIN DEGLUDEC 100 U/ML
INJECTION, SOLUTION SUBCUTANEOUS
Qty: 2 PEN | Refills: 11 | Status: SHIPPED | OUTPATIENT
Start: 2017-10-12 | End: 2017-11-09

## 2017-10-12 RX ORDER — PRAVASTATIN SODIUM 40 MG/1
40 TABLET ORAL
Qty: 30 TAB | Refills: 11 | Status: SHIPPED | OUTPATIENT
Start: 2017-10-12 | End: 2018-08-27 | Stop reason: DRUGHIGH

## 2017-10-13 NOTE — PROGRESS NOTES
[address] [place] [address] and Primary Care  [place] [address]  [address] [facility] [address]  Phone:  [phone]  Fax: [phone]       Chief Complaint   Patient presents with    Annual Wellness Visit    Hypertension    Diabetes   . SUBJECTIVE:    Sylvia Wolfe is a 77 y.o. female Comes in for return visit stating that she had done fairly well. Unfortunately she continues to have hypoglycemia. I talked to her at length about her diabetes, and the fact that her current regimen is not safe for her, as I have told her on repetitive occasions. She has declined any changes to date, however. She currently takes 36 units every morning, and occasionally four before dinner. I talked to her about eating at the same time every day, and at least eating similar number of calories with her respective meals. She has been taking her antihypertensive medication as prescribed. I have not placed her on a statin primarily driven by her cholesterol numbers, but in reality she probably should be on one based on the duration of her diabetes. She is also taking her thyroid supplement as prescribed. Finally, she has gained several pounds since I last saw her. Current Outpatient Prescriptions   Medication Sig Dispense Refill    insulin degludec (TRESIBA FLEXTOUCH U-100) 100 unit/mL (3 mL) inpn 20 units every morning 2 Pen 11    pravastatin (PRAVACHOL) 40 mg tablet Take 1 Tab by mouth nightly. 30 Tab 11    losartan-hydroCHLOROthiazide (HYZAAR) 100-25 mg per tablet TAKE 1 TABLET BY MOUTH DAILY 30 Tab 11    insulin aspart protamine/insulin aspart (NOVOLOG MIX 70-30) 100 unit/mL (70-30) injection 36 Units by SubCUTAneous route Daily (before breakfast). 10 mL 11    clopidogrel (PLAVIX) 75 mg tab Take 1 Tab by mouth daily.  30 Tab 11    fenofibrate nanocrystallized (TRICOR) 145 mg tablet TAKE 1 TABLET BY MOUTH EVERY DAY 30 Tab 11    BD INSULIN PEN NEEDLE UF SHORT 31 gauge x 5/16\" ndle USE AS DIRECTED TWICE A DAY  11    levothyroxine (SYNTHROID) 150 mcg tablet TAKE 1 TABLET BY MOUTH EVERY DAY 30 Tab 11    amLODIPine (NORVASC) 10 mg tablet TAKE 1 TABLET BY MOUTH EVERY MORNING 30 Tab 11    latanoprost (XALATAN) 0.005 % ophthalmic solution Administer 1 Drop to both eyes nightly.  brimonidine (ALPHAGAN) 0.15 % ophthalmic solution Administer 1 Drop to both eyes two (2) times a day.        Past Medical History:   Diagnosis Date    [facility]     Heart failure ([facility]     unknown to family    Hypercholesteremia     Hypertension     Stroke ([facility]     Thyroid disease      Past Surgical History:   Procedure Laterality Date    HX GYN      HX HEENT      thyroidectomy    HX HYSTERECTOMY      REMOVAL GALLBLADDER      THYROIDECTOMY       No Known Allergies      REVIEW OF SYSTEMS:  General: negative for - chills or fever  ENT: negative for - headaches, nasal congestion or tinnitus  Respiratory: negative for - cough, hemoptysis, shortness of breath or wheezing  Cardiovascular : negative for - chest pain, edema, palpitations or shortness of breath  Gastrointestinal: negative for - abdominal pain, blood in stools, heartburn or nausea/vomiting  Genito-Urinary: no dysuria, trouble voiding, or hematuria  Musculoskeletal: negative for - gait disturbance, joint pain, joint stiffness or joint swelling  Neurological: no TIA or stroke symptoms  Hematologic: no bruises, no bleeding, no swollen glands  Integument: no lumps, mole changes, nail changes or rash  Endocrine: no malaise/lethargy or unexpected weight changes      Social History     Social History    Marital status:      Spouse name: N/A    Number of children: 1    Years of education: N/A     Occupational History    retired      Social History Main Topics    Smoking status: Never Smoker    Smokeless tobacco: Never Used    Alcohol use Yes      Comment: been years    Drug use: No    Sexual activity: Not Currently     Other Topics Concern  Not on file     Social History Narrative     Family History   Problem Relation Age of Onset    Diabetes Father     No Known Problems Mother        OBJECTIVE:    Visit Vitals    /72    Pulse 88    Temp 97.9 °F (36.6 °C) (Oral)    Resp 16    Ht 5' 3\" (1.6 m)    Wt 173 lb 14.4 oz (78.9 kg)    SpO2 98%    BMI 30.81 kg/m2     CONSTITUTIONAL: well , well nourished, appears age appropriate  EYES: perrla, eom intact  ENMT:moist mucous membranes, pharynx clear  NECK: supple. Thyroid normal  RESPIRATORY: Chest: clear to ascultation and percussion   CARDIOVASCULAR: Heart: regular rate and rhythm  GASTROINTESTINAL: Abdomen: soft, bowel sounds active  HEMATOLOGIC: no pathological lymph nodes palpated  MUSCULOSKELETAL: Extremities: no edema, pulse 1+   INTEGUMENT: No unusual rashes or suspicious skin lesions noted. Nails appear normal.  NEUROLOGIC: non-focal exam   MENTAL STATUS: alert and oriented, appropriate affect      ASSESSMENT:  1. Type 2 diabetes mellitus without complication, with long-term current use of insulin ([address]    2. Dyslipidemia    3. Essential hypertension    4. Hypothyroidism due to medication    5. Screening for alcoholism    6. Screening for depression    7. Wellness examination        PLAN:    1. Her diabetes is not well controlled and she is prone to hypoglycemia. This not how it should be in a 77 y.o. patient. I will attempt to start her on a Basal Insulin, specifically Degludec. 2. The next step will be to consider GLP-1 agonist.  The patient has had ketoacidosis on two separate occasions. This would suggest she is a type 1, but this needs to be clarified with ARVIND antibodies. Calls in this question to multiple diabetologist.   There was no relative absolute contraindication to using a GLP-1 agonist and a Basal Insulin in a type 1 diabetic. 3. Because of the longstanding diabetic, regardless of her Apo B level, it is appropriate for me to place her on a statin.   I am somewhat delayed in doing this. She will therefore be started on Atorvastatin 5 mg. 4. Blood pressure is slightly elevated. She states that she is taking both of the antihypertensive medications as prescribed. I suspect this is a sympathetic surge driving the pressure today. Therefore, on a return visit in six weeks if she is not normotensive, a fourth antihypertensive medication will be started. 5. She will continue thyroid supplement and TSH will be checked to ensure compliance and efficacy. .  Orders Placed This Encounter    Depression Screen Annual    APOLIPOPROTEIN B    METABOLIC PANEL, BASIC    HEMOGLOBIN A1C WITH EAG    TSH 3RD GENERATION    insulin degludec (TRESIBA FLEXTOUCH U-100) 100 unit/mL (3 mL) inpn    pravastatin (PRAVACHOL) 40 mg tablet         Follow-up Disposition:  Return in about 6 weeks (around 11/23/2017). Amrit Antony MD    This is a Subsequent Medicare Annual Wellness Exam (AWV) (Performed 12 months after IPPE or effective date of Medicare Part B enrollment, Once in a lifetime)    I have reviewed the patient's medical history in detail and updated the computerized patient record. History     Past Medical History:   Diagnosis Date    Diabetes ([address]     Heart failure ([address]     unknown to family    Hypercholesteremia     Hypertension     [facility]     Thyroid disease       Past Surgical History:   Procedure Laterality Date    HX GYN      HX HEENT      thyroidectomy    HX HYSTERECTOMY      REMOVAL GALLBLADDER      THYROIDECTOMY       Current Outpatient Prescriptions   Medication Sig Dispense Refill    insulin degludec (TRESIBA FLEXTOUCH U-100) 100 unit/mL (3 mL) inpn 20 units every morning 2 Pen 11    pravastatin (PRAVACHOL) 40 mg tablet Take 1 Tab by mouth nightly.  30 Tab 11    losartan-hydroCHLOROthiazide (HYZAAR) 100-25 mg per tablet TAKE 1 TABLET BY MOUTH DAILY 30 Tab 11    insulin aspart protamine/insulin aspart (NOVOLOG MIX 70-30) 100 unit/mL (70-30) injection 36 Units by SubCUTAneous route Daily (before breakfast). 10 mL 11    clopidogrel (PLAVIX) 75 mg tab Take 1 Tab by mouth daily. 30 Tab 11    fenofibrate nanocrystallized (TRICOR) 145 mg tablet TAKE 1 TABLET BY MOUTH EVERY DAY 30 Tab 11    BD INSULIN PEN NEEDLE UF SHORT 31 gauge x 5/16\" ndle USE AS DIRECTED TWICE A DAY  11    levothyroxine (SYNTHROID) 150 mcg tablet TAKE 1 TABLET BY MOUTH EVERY DAY 30 Tab 11    amLODIPine (NORVASC) 10 mg tablet TAKE 1 TABLET BY MOUTH EVERY MORNING 30 Tab 11    latanoprost (XALATAN) 0.005 % ophthalmic solution Administer 1 Drop to both eyes nightly.  brimonidine (ALPHAGAN) 0.15 % ophthalmic solution Administer 1 Drop to both eyes two (2) times a day. No Known Allergies  Family History   Problem Relation Age of Onset    Diabetes Father     No Known Problems Mother      Social History   Substance Use Topics    Smoking status: Never Smoker    Smokeless tobacco: Never Used    Alcohol use Yes      Comment: been years     Patient Active Problem List   Diagnosis Code    DKA (diabetic ketoacidosis) ([facility] E13.10    Diabetes mellitus ([facility] E11.9    Dyslipidemia E78.5    Hypertension I10    Hypothyroidism E03.9    Memory deficit R41.3    DKA (diabetic ketoacidoses) ([facility] E13.10       Depression Risk Factor Screening:     PHQ over the last two weeks 7/25/2017   Little interest or pleasure in doing things Not at all   Feeling down, depressed or hopeless Not at all   Total Score PHQ 2 0     Alcohol Risk Factor Screening: You do not drink alcohol or very rarely. Functional Ability and Level of Safety:   Hearing Loss  Hearing is good. Activities of Daily Living  The home contains: no safety equipment. Patient does total self care    Fall Risk  Fall Risk Assessment, last 12 mths 7/25/2017   Able to walk? Yes   Fall in past 12 months?  No       Abuse Screen  Patient is not abused    Cognitive Screening   Evaluation of Cognitive Function:  Has your family/caregiver stated any concerns about your memory: no  Normal    Patient Care Team   Patient Care Team:  Ciera Mattson MD as PCP - General (Internal Medicine)  Herbert Mustafa, RN as Ambulatory Care Navigator    Assessment/Plan   Education and counseling provided:  Are appropriate based on today's review and evaluation    Diagnoses and all orders for this visit:    1. Type 2 diabetes mellitus without complication, with long-term current use of insulin (HCC)  -     HEMOGLOBIN A1C WITH EAG    2. Dyslipidemia  -     APOLIPOPROTEIN B    3. Essential hypertension  -     METABOLIC PANEL, BASIC    4. Hypothyroidism due to medication  -     TSH 3RD GENERATION    5. Screening for alcoholism  -     Annual  Alcohol Screen 15 min ()    6. Screening for depression  -     Depression Screen Annual    7. Wellness examination    Other orders  -     insulin degludec (TRESIBA FLEXTOUCH U-100) 100 unit/mL (3 mL) inpn; 20 units every morning  -     pravastatin (PRAVACHOL) 40 mg tablet; Take 1 Tab by mouth nightly.         Health Maintenance Due   Topic Date Due    FOOT EXAM Q1  05/08/2016    EYE EXAM RETINAL OR DILATED Q1  05/08/2016    GLAUCOMA SCREENING Q2Y  05/08/2017    MICROALBUMIN Q1  07/14/2017    LIPID PANEL Q1  07/14/2017    MEDICARE YEARLY EXAM  07/15/2017

## 2017-10-13 NOTE — PATIENT INSTRUCTIONS

## 2017-10-14 LAB
APO B SERPL-MCNC: 69 MG/DL (ref 54–133)
BUN SERPL-MCNC: 25 MG/DL (ref 8–27)
BUN/CREAT SERPL: 24 (ref 12–28)
CALCIUM SERPL-MCNC: 9.4 MG/DL (ref 8.7–10.3)
CHLORIDE SERPL-SCNC: 91 MMOL/L (ref 96–106)
CO2 SERPL-SCNC: 21 MMOL/L (ref 18–29)
CREAT SERPL-MCNC: 1.05 MG/DL (ref 0.57–1)
EST. AVERAGE GLUCOSE BLD GHB EST-MCNC: 189 MG/DL
GLUCOSE SERPL-MCNC: 575 MG/DL (ref 65–99)
HBA1C MFR BLD: 8.2 % (ref 4.8–5.6)
POTASSIUM SERPL-SCNC: 4.5 MMOL/L (ref 3.5–5.2)
SODIUM SERPL-SCNC: 134 MMOL/L (ref 134–144)
TSH SERPL DL<=0.005 MIU/L-ACNC: 1.86 UIU/ML (ref 0.45–4.5)

## 2017-11-04 ENCOUNTER — HOSPITAL ENCOUNTER (EMERGENCY)
Age: 77
Discharge: HOME | End: 2017-11-04
Attending: EMERGENCY MEDICINE
Payer: MEDICARE

## 2017-11-04 VITALS
OXYGEN SATURATION: 98 % | WEIGHT: 170 LBS | HEART RATE: 77 BPM | TEMPERATURE: 97.9 F | SYSTOLIC BLOOD PRESSURE: 137 MMHG | DIASTOLIC BLOOD PRESSURE: 58 MMHG | BODY MASS INDEX: 30.12 KG/M2 | HEIGHT: 63 IN | RESPIRATION RATE: 28 BRPM

## 2017-11-04 DIAGNOSIS — R63.8 DECREASED ORAL INTAKE: ICD-10-CM

## 2017-11-04 DIAGNOSIS — E16.2 HYPOGLYCEMIA: Primary | ICD-10-CM

## 2017-11-04 LAB
ALBUMIN SERPL-MCNC: 3.5 G/DL (ref 3.5–5)
ALBUMIN/GLOB SERPL: 0.9 {RATIO} (ref 1.1–2.2)
ALP SERPL-CCNC: 58 U/L (ref 45–117)
ALT SERPL-CCNC: 39 U/L (ref 12–78)
ANION GAP SERPL CALC-SCNC: 9 MMOL/L (ref 5–15)
APPEARANCE UR: ABNORMAL
AST SERPL-CCNC: 45 U/L (ref 15–37)
BACTERIA URNS QL MICRO: NEGATIVE /HPF
BASOPHILS # BLD: 0 K/UL (ref 0–0.1)
BASOPHILS NFR BLD: 0 % (ref 0–1)
BILIRUB SERPL-MCNC: 0.3 MG/DL (ref 0.2–1)
BILIRUB UR QL: NEGATIVE
BUN SERPL-MCNC: 24 MG/DL (ref 6–20)
BUN/CREAT SERPL: 25 (ref 12–20)
CALCIUM SERPL-MCNC: 8.8 MG/DL (ref 8.5–10.1)
CHLORIDE SERPL-SCNC: 104 MMOL/L (ref 97–108)
CO2 SERPL-SCNC: 24 MMOL/L (ref 21–32)
COLOR UR: ABNORMAL
CREAT SERPL-MCNC: 0.97 MG/DL (ref 0.55–1.02)
EOSINOPHIL # BLD: 0.1 K/UL (ref 0–0.4)
EOSINOPHIL NFR BLD: 2 % (ref 0–7)
EPITH CASTS URNS QL MICRO: ABNORMAL /LPF
ERYTHROCYTE [DISTWIDTH] IN BLOOD BY AUTOMATED COUNT: 14.3 % (ref 11.5–14.5)
GLOBULIN SER CALC-MCNC: 3.8 G/DL (ref 2–4)
GLUCOSE BLD STRIP.AUTO-MCNC: 107 MG/DL (ref 65–100)
GLUCOSE BLD STRIP.AUTO-MCNC: 133 MG/DL (ref 65–100)
GLUCOSE BLD STRIP.AUTO-MCNC: 249 MG/DL (ref 65–100)
GLUCOSE BLD STRIP.AUTO-MCNC: >600 MG/DL (ref 65–100)
GLUCOSE SERPL-MCNC: 65 MG/DL (ref 65–100)
GLUCOSE UR STRIP.AUTO-MCNC: NEGATIVE MG/DL
HCT VFR BLD AUTO: 34.8 % (ref 35–47)
HGB BLD-MCNC: 11.7 G/DL (ref 11.5–16)
HGB UR QL STRIP: NEGATIVE
HYALINE CASTS URNS QL MICRO: ABNORMAL /LPF (ref 0–5)
KETONES UR QL STRIP.AUTO: NEGATIVE MG/DL
LACTATE SERPL-SCNC: 1.4 MMOL/L (ref 0.4–2)
LEUKOCYTE ESTERASE UR QL STRIP.AUTO: ABNORMAL
LYMPHOCYTES # BLD: 0.8 K/UL (ref 0.8–3.5)
LYMPHOCYTES NFR BLD: 21 % (ref 12–49)
MCH RBC QN AUTO: 31 PG (ref 26–34)
MCHC RBC AUTO-ENTMCNC: 33.6 G/DL (ref 30–36.5)
MCV RBC AUTO: 92.3 FL (ref 80–99)
MONOCYTES # BLD: 0.5 K/UL (ref 0–1)
MONOCYTES NFR BLD: 14 % (ref 5–13)
NEUTS SEG # BLD: 2.5 K/UL (ref 1.8–8)
NEUTS SEG NFR BLD: 63 % (ref 32–75)
NITRITE UR QL STRIP.AUTO: NEGATIVE
PH UR STRIP: 5.5 [PH] (ref 5–8)
PLATELET # BLD AUTO: 241 K/UL (ref 150–400)
POTASSIUM SERPL-SCNC: 4.3 MMOL/L (ref 3.5–5.1)
PROT SERPL-MCNC: 7.3 G/DL (ref 6.4–8.2)
PROT UR STRIP-MCNC: NEGATIVE MG/DL
RBC # BLD AUTO: 3.77 M/UL (ref 3.8–5.2)
RBC #/AREA URNS HPF: ABNORMAL /HPF (ref 0–5)
SERVICE CMNT-IMP: ABNORMAL
SODIUM SERPL-SCNC: 137 MMOL/L (ref 136–145)
SP GR UR REFRACTOMETRY: 1.02 (ref 1–1.03)
UA: UC IF INDICATED,UAUC: ABNORMAL
UROBILINOGEN UR QL STRIP.AUTO: 0.2 EU/DL (ref 0.2–1)
WBC # BLD AUTO: 3.9 K/UL (ref 3.6–11)
WBC URNS QL MICRO: ABNORMAL /HPF (ref 0–4)

## 2017-11-04 PROCEDURE — 82962 GLUCOSE BLOOD TEST: CPT

## 2017-11-04 PROCEDURE — 36415 COLL VENOUS BLD VENIPUNCTURE: CPT | Performed by: EMERGENCY MEDICINE

## 2017-11-04 PROCEDURE — 99285 EMERGENCY DEPT VISIT HI MDM: CPT

## 2017-11-04 PROCEDURE — 85025 COMPLETE CBC W/AUTO DIFF WBC: CPT | Performed by: EMERGENCY MEDICINE

## 2017-11-04 PROCEDURE — 81001 URINALYSIS AUTO W/SCOPE: CPT | Performed by: EMERGENCY MEDICINE

## 2017-11-04 PROCEDURE — 83605 ASSAY OF LACTIC ACID: CPT | Performed by: EMERGENCY MEDICINE

## 2017-11-04 PROCEDURE — 80053 COMPREHEN METABOLIC PANEL: CPT | Performed by: EMERGENCY MEDICINE

## 2017-11-04 NOTE — Clinical Note
Keep track of your sugars. HOLD YOUR MORNING INSULIN 70/30 NOVOLOG TODAY. YOU CAN TAKE LONG ACTING INSULIN.

## 2017-11-04 NOTE — ED PROVIDER NOTES
[address]  EMERGENCY DEPARTMENT HISTORY AND PHYSICAL EXAM       Date of Service: 11/4/2017   Patient Name: Rome Inman   YOB: 1940  Medical Record Number: [id number]    History of Presenting Illness     Chief Complaint   Patient presents with    Low Blood Sugar     Patient arrived via EMS for blood sugar of 29. Patient was given oral glucose upon arrival and patient when arriving to ED is alert and oriented. History Provided By:  Patient, EMS, ED notes    Additional History:   Rome Inman is a 77 y.o. female with PMhx significant for DM, HTN, and CVA who presents via EMS to the ED for evaluation of low blood sugar. EMS states they were called to the scene for a low blood sugar of 29. Upon arrival, pt was given found unresponsive and was given oral glucose. Pt slowly regained consciousness and alertness en route to ED. When arriving to ED, pt's BS was up to 166. She notes a decrease in appetite as she admits to not eating dinner last night. Pt additionally admits to not taking her insulin last night as well. Per ED notes, pt denies nausea, vomiting, diarrhea, and fever. Social Hx: - Tobacco, - EtOH, - Illicit Drugs    There are no other complaints, changes or physical findings at this time.     Primary Care Provider: Ciera Mattson MD     Past History     Past Medical History:   Past Medical History:   Diagnosis Date    Diabetes ([address]     Heart failure ([address]     unknown to family    Hypercholesteremia     Hypertension     Stroke ([address]     Thyroid disease         Past Surgical History:   Past Surgical History:   Procedure Laterality Date    HX GYN      HX HEENT      thyroidectomy    HX HYSTERECTOMY      REMOVAL GALLBLADDER      THYROIDECTOMY          Family History:   Family History   Problem Relation Age of Onset    Diabetes Father     No Known Problems Mother         Social History:   Social History   Substance Use Topics    Smoking status: Never Smoker    Smokeless tobacco: Never Used    Alcohol use No      Comment: been years        Allergies:   No Known Allergies      Review of Systems   Review of Systems   Constitutional: Negative for activity change, appetite change, fatigue and fever. HENT: Negative. Negative for congestion, rhinorrhea and sore throat. Respiratory: Negative. Negative for cough, shortness of breath and wheezing. Cardiovascular: Negative. Negative for chest pain and leg swelling. Gastrointestinal: Negative. Negative for abdominal distention, abdominal pain, constipation, diarrhea, nausea and vomiting. Endocrine: Negative. Genitourinary: Negative for difficulty urinating, dysuria, menstrual problem, vaginal bleeding and vaginal discharge. Musculoskeletal: Negative. Negative for arthralgias, joint swelling and myalgias. Skin: Negative. Negative for rash. Neurological: Negative. Negative for dizziness, weakness, light-headedness and headaches. Psychiatric/Behavioral: Negative. Physical Exam  Physical Exam   Constitutional: She is oriented to person, place, and time. She appears well-developed and well-nourished. HENT:   Head: Atraumatic. Eyes: EOM are normal.   Cardiovascular: Normal rate, regular rhythm, normal heart sounds and intact distal pulses. Exam reveals no gallop and no friction rub. No murmur heard. Pulmonary/Chest: Effort normal and breath sounds normal. No respiratory distress. She has no wheezes. She has no rales. She exhibits no tenderness. Abdominal: Soft. Bowel sounds are normal. She exhibits no distension and no mass. There is no tenderness. There is no rebound and no guarding. Musculoskeletal: Normal range of motion. She exhibits no edema or tenderness. Neurological: She is alert and oriented to person, place, and time. Neurologically based   Skin: Skin is warm. Psychiatric: She has a normal mood and affect. Nursing note and vitals reviewed. Medical Decision Making   I am the first provider for this patient. I reviewed the vital signs, available nursing notes, past medical history, past surgical history, family history and social history. Old Medical Records: Old medical records. Provider Notes:   Reoccurring episodes of hypoglycemia. Endorses not eating before going to bed. Will do blood work to look for any other contributing factors. Denies taking any extra insulin last night. ED Course:  3:20 AM   Initial assessment performed. The patients presenting problems have been discussed, and they are in agreement with the care plan formulated and outlined with them. I have encouraged them to ask questions as they arise throughout their visit. Progress Notes:   5:04 AM  Pt's blood glucose is at 133 now. Will repeat blood work for consistent blood glucose levels. 5:54 AM  Pt's blood glucose is 241 now. Will discharge pt shortly. Diagnostic Study Results   Labs -      Recent Results (from the past 12 hour(s))   GLUCOSE, POC    Collection Time: 11/04/17  3:42 AM   Result Value Ref Range    Glucose (POC) >600 (HH) 65 - 100 mg/dL    Performed by [employer]    GLUCOSE, POC    Collection Time: 11/04/17  3:44 AM   Result Value Ref Range    Glucose (POC) 107 (H) 65 - 100 mg/dL    Performed by [employer]    CBC WITH AUTOMATED DIFF    Collection Time: 11/04/17  3:54 AM   Result Value Ref Range    WBC 3.9 3.6 - 11.0 K/uL    RBC 3.77 (L) 3.80 - 5.20 M/uL    HGB 11.7 11.5 - 16.0 g/dL    HCT 34.8 (L) 35.0 - 47.0 %    MCV 92.3 80.0 - 99.0 FL    MCH 31.0 26.0 - 34.0 PG    MCHC 33.6 30.0 - 36.5 g/dL    RDW 14.3 11.5 - 14.5 %    PLATELET 800 577 - 127 K/uL    NEUTROPHILS 63 32 - 75 %    LYMPHOCYTES 21 12 - 49 %    MONOCYTES 14 (H) 5 - 13 %    EOSINOPHILS 2 0 - 7 %    BASOPHILS 0 0 - 1 %    ABS. NEUTROPHILS 2.5 1.8 - 8.0 K/UL    ABS. LYMPHOCYTES 0.8 0.8 - 3.5 K/UL    ABS. MONOCYTES 0.5 0.0 - 1.0 K/UL    ABS.  EOSINOPHILS 0.1 0.0 - 0.4 K/UL    ABS. BASOPHILS 0.0 0.0 - 0.1 K/UL   METABOLIC PANEL, COMPREHENSIVE    Collection Time: 11/04/17  3:54 AM   Result Value Ref Range    Sodium 137 136 - 145 mmol/L    Potassium 4.3 3.5 - 5.1 mmol/L    Chloride 104 97 - 108 mmol/L    CO2 24 21 - 32 mmol/L    Anion gap 9 5 - 15 mmol/L    Glucose 65 65 - 100 mg/dL    BUN 24 (H) 6 - 20 MG/DL    Creatinine 0.97 0.55 - 1.02 MG/DL    BUN/Creatinine ratio 25 (H) 12 - 20      GFR est AA >60 >60 ml/min/1.73m2    GFR est non-AA 56 (L) >60 ml/min/1.73m2    Calcium 8.8 8.5 - 10.1 MG/DL    Bilirubin, total 0.3 0.2 - 1.0 MG/DL    ALT (SGPT) 39 12 - 78 U/L    AST (SGOT) 45 (H) 15 - 37 U/L    Alk. phosphatase 58 45 - 117 U/L    Protein, total 7.3 6.4 - 8.2 g/dL    Albumin 3.5 3.5 - 5.0 g/dL    Globulin 3.8 2.0 - 4.0 g/dL    A-G Ratio 0.9 (L) 1.1 - 2.2     LACTIC ACID    Collection Time: 11/04/17  3:54 AM   Result Value Ref Range    Lactic acid 1.4 0.4 - 2.0 MMOL/L   URINALYSIS W/ REFLEX CULTURE    Collection Time: 11/04/17  4:09 AM   Result Value Ref Range    Color YELLOW/STRAW      Appearance CLOUDY (A) CLEAR      Specific gravity 1.019 1.003 - 1.030      pH (UA) 5.5 5.0 - 8.0      Protein NEGATIVE  NEG mg/dL    Glucose NEGATIVE  NEG mg/dL    Ketone NEGATIVE  NEG mg/dL    Bilirubin NEGATIVE  NEG      Blood NEGATIVE  NEG      Urobilinogen 0.2 0.2 - 1.0 EU/dL    Nitrites NEGATIVE  NEG      Leukocyte Esterase MODERATE (A) NEG      WBC 0-4 0 - 4 /hpf    RBC 0-5 0 - 5 /hpf    Epithelial cells FEW FEW /lpf    Bacteria NEGATIVE  NEG /hpf    UA:UC IF INDICATED CULTURE NOT INDICATED BY UA RESULT CNI      Hyaline cast 2-5 0 - 5 /lpf   GLUCOSE, POC    Collection Time: 11/04/17  4:47 AM   Result Value Ref Range    Glucose (POC) 133 (H) 65 - 100 mg/dL    Performed by [facility]OR[facility]      Vital Signs-Reviewed the patient's vital signs.    Patient Vitals for the past 12 hrs:   Temp Pulse Resp BP SpO2   11/04/17 0507 - 76 22 142/52 99 %   11/04/17 0415 - 77 21 (!) 147/123 100 %   11/04/17 0332 97.9 °F (36.6 °C) 75 16 (!) 138/106 100 %       Medications Given in the ED:  Medications - No data to display    Diagnosis   Clinical Impression:   1. Hypoglycemia    2. Decreased oral intake         Plan:  1:   Follow-up Information     Follow up With Details Comments [id number] SEli Ray MD In 2 days  [address] [address]  [address]  [phone]      [facility] EMERGENCY DEPT  If symptoms worsen [address] [place] [address]  [phone]        2:   Current Discharge Medication List        Return to ED if Worse    Discharge Note:  6:04 AM  The pt is ready for discharge. The pt's signs, symptoms, diagnosis, and discharge instructions have been discussed and pt has conveyed their understanding. The pt is to follow up as recommended or return to ER should their symptoms worsen. Plan has been discussed and pt is in agreement.  _______________________________   Attestations: This note is prepared by [employer], acting as Scribe for MD Awais Billings MD: The scribe's documentation has been prepared under my direction and personally reviewed by me in its entirety.  I confirm that the note above accurately reflects all work, treatment, procedures, and medical decision making performed by me.    _______________________________

## 2017-11-04 NOTE — DISCHARGE INSTRUCTIONS
Learning About Low Blood Sugar (Hypoglycemia) in Diabetes  What is low blood sugar (hypoglycemia)? Hypoglycemia means that your blood sugar is low and your body (especially your brain) is not getting enough fuel. If you have diabetes, your blood sugar can go too low if you take too much of some diabetes medicines. It can also go too low if you miss a meal. And it can happen if you exercise too hard without eating enough food. Some medicines used to treat other health problems can cause low blood sugar too. What are the symptoms? Symptoms of low blood sugar can start quickly. It may take just 10 to 15 minutes. If you have had diabetes for many years, you may not realize that your blood sugar is low until it drops very low. · If your blood sugar level drops below 70 (mild low blood sugar), you may feel tired, anxious, dizzy, weak, shaky, or sweaty. You may have a fast heartbeat or blurry vision. · If your blood sugar level continues to drop (usually below 40), your behavior may change. You may feel more irritable. You may find it hard to concentrate or talk. And you may feel unsteady when you stand or walk. You may become too weak or confused to eat something with sugar to raise your blood sugar level. · If your blood sugar level drops very low (usually below 20), you may pass out (lose consciousness). Or you may have a seizure or stroke. If you have symptoms of severe low blood sugar, you need to get medical care right away. If you had a low blood sugar level during the night, you may wake up tired or with a headache. Or you may sweat so much during the night that your pajamas or sheets are damp when you wake up. How is low blood sugar treated? You can treat low blood sugar by eating or drinking something that has 15 grams of carbohydrate. These should be quick-sugar foods.  Check your blood sugar level again 15 minutes after having a quick-sugar food to make sure your level is getting back to your target range. Here are examples of quick-sugar foods that have 15 grams of carbohydrate:  · 3 to 4 glucose tablets  · 1 tube of glucose gel  · Hard candy (such as 3 Jolly Ranchers or 5 to 7 Life Savers)  · 1 tablespoon honey  · 2 tablespoons of raisins  · ½ cup to ¾ cup (4 to 6 ounces) of fruit juice or regular (not diet) soda  · 1 tablespoon of sugar  · 1 cup of fat-free milk  If you have problems with severe low blood sugar, someone else may have to give you a shot of glucagon. This is a hormone that raises blood sugar levels quickly. How can you prevent low blood sugar? You can take steps to prevent low blood sugar. · Follow your treatment plan. Take your insulin or other diabetes medicine exactly as your doctor prescribed it. Talk with your doctor if you're having low blood sugar often. Your medicine may need to be adjusted if it's causing your low blood sugar. · Check your blood sugar levels often. This helps you find early changes before an emergency happens. · Keep a quick-sugar food with you in case your blood sugar level drops low. · Eat small meals more often so that you don't get too hungry between meals. Don't skip meals. · Balance extra exercise with eating more. Check your blood sugar and learn how it changes after exercise. If your blood sugar stays at a normal level, you may not need to eat after you exercise. · Limit how much alcohol you drink. Alcohol can make low blood sugar go even lower. Don't drink alcohol if you have problems recognizing the early signs of low blood sugar. · Keep a diary of your symptoms. This helps you learn when changes in your body may signal low blood sugar. And keep track of how often you have low blood sugar, including when you last ate and what you ate. This will help you learn what causes your blood sugar to drop. · Learn about diabetes and low blood sugar.  Support groups or a diabetes education center can help you understand how medicines, diet, and exercise affect your blood sugar levels. Since low blood sugar levels can quickly become an emergency, be sure to wear medical alert jewelry, such as a medical alert bracelet. This is to let people know you have diabetes so they can get help for you. You can buy this at most drugstores. And make sure your family, friends, and coworkers know the symptoms of low blood sugar. Teach them what to do to get your sugar level up. Follow-up care is a key part of your treatment and safety. Be sure to make and go to all appointments, and call your doctor if you are having problems. It's also a good idea to know your test results and keep a list of the medicines you take. Where can you learn more? Go to http://toi-sunil.info/. Enter L085 in the search box to learn more about \"Learning About Low Blood Sugar (Hypoglycemia) in Diabetes. \"  Current as of: March 13, 2017  Content Version: 11.4  © 9522-2619 [employer]. Care instructions adapted under license by [employer] (which disclaims liability or warranty for this information). If you have questions about a medical condition or this instruction, always ask your healthcare professional. [address] any warranty or liability for your use of this information.

## 2017-11-04 NOTE — ED NOTES
Discharge instructions reviewed with patient. Discharge instructions given to patient per Dr. Auther Koyanagi. Patient able to return/verbalize discharge instructions. Copy of discharge instructions given. Patient condition stable, respiratory status within normal limits, neuro status intact. Ambulatory out of ER, accompanied by sons.

## 2017-11-04 NOTE — ED TRIAGE NOTES
Patient arrived via EMS with no complaints. EMS states they were called to the scene for a low blood sugar of 29. Upon arrival patient was given oral glucose and patient slowly regained alertness while on the way to the hospital. Patient has no complaints. Patient denies n/v/d and fevers. Patient denies any pain. Patient updated on plan of care and call bell within reach.

## 2017-11-06 ENCOUNTER — PATIENT OUTREACH (OUTPATIENT)
Dept: INTERNAL MEDICINE CLINIC | Age: 77
End: 2017-11-06

## 2017-11-07 ENCOUNTER — PATIENT OUTREACH (OUTPATIENT)
Dept: INTERNAL MEDICINE CLINIC | Age: 77
End: 2017-11-07

## 2017-11-07 NOTE — PROGRESS NOTES
NNTOCED [facility] 11/4/2017:  Hypoglycemia-visit resched    Consult done w/ PCP. Advised to bring patient in for office visit prior to the scheduled 11/14. NN called patient; stated she is unable to come-going out of town on family matters; can only come 11/13-appointment rescheduled by NN for 11/13/2017 @2;15PM.     NN w/ continue to f/u. Wanda Nunez

## 2017-11-09 ENCOUNTER — HOSPITAL ENCOUNTER (EMERGENCY)
Age: 77
Discharge: HOME | End: 2017-11-09
Attending: EMERGENCY MEDICINE
Payer: MEDICARE

## 2017-11-09 VITALS
SYSTOLIC BLOOD PRESSURE: 131 MMHG | HEIGHT: 63 IN | BODY MASS INDEX: 29.61 KG/M2 | DIASTOLIC BLOOD PRESSURE: 94 MMHG | HEART RATE: 82 BPM | WEIGHT: 167.11 LBS | OXYGEN SATURATION: 100 % | TEMPERATURE: 98.4 F | RESPIRATION RATE: 21 BRPM

## 2017-11-09 DIAGNOSIS — E87.6 HYPOKALEMIA: Primary | ICD-10-CM

## 2017-11-09 DIAGNOSIS — E16.2 HYPOGLYCEMIA: ICD-10-CM

## 2017-11-09 LAB
ALBUMIN SERPL-MCNC: 3.8 G/DL (ref 3.5–5)
ALBUMIN/GLOB SERPL: 1 {RATIO} (ref 1.1–2.2)
ALP SERPL-CCNC: 59 U/L (ref 45–117)
ALT SERPL-CCNC: 35 U/L (ref 12–78)
ANION GAP SERPL CALC-SCNC: 8 MMOL/L (ref 5–15)
APPEARANCE UR: CLEAR
AST SERPL-CCNC: 27 U/L (ref 15–37)
BACTERIA URNS QL MICRO: NEGATIVE /HPF
BASOPHILS # BLD: 0 K/UL
BASOPHILS NFR BLD: 0 %
BILIRUB SERPL-MCNC: 0.3 MG/DL (ref 0.2–1)
BILIRUB UR QL: NEGATIVE
BUN SERPL-MCNC: 20 MG/DL (ref 6–20)
BUN/CREAT SERPL: 24 (ref 12–20)
CALCIUM SERPL-MCNC: 8.6 MG/DL (ref 8.5–10.1)
CHLORIDE SERPL-SCNC: 105 MMOL/L (ref 97–108)
CO2 SERPL-SCNC: 27 MMOL/L (ref 21–32)
COLOR UR: ABNORMAL
CREAT SERPL-MCNC: 0.83 MG/DL (ref 0.55–1.02)
DIFFERENTIAL METHOD BLD: ABNORMAL
EOSINOPHIL # BLD: 0.1 K/UL
EOSINOPHIL NFR BLD: 1 %
EPITH CASTS URNS QL MICRO: ABNORMAL /LPF
ERYTHROCYTE [DISTWIDTH] IN BLOOD BY AUTOMATED COUNT: 13.4 % (ref 11.5–14.5)
GLOBULIN SER CALC-MCNC: 3.8 G/DL (ref 2–4)
GLUCOSE BLD STRIP.AUTO-MCNC: 76 MG/DL (ref 65–100)
GLUCOSE BLD STRIP.AUTO-MCNC: 80 MG/DL (ref 65–100)
GLUCOSE SERPL-MCNC: 55 MG/DL (ref 65–100)
GLUCOSE UR STRIP.AUTO-MCNC: 100 MG/DL
HCT VFR BLD AUTO: 34.4 % (ref 35–47)
HGB BLD-MCNC: 12 G/DL (ref 11.5–16)
HGB UR QL STRIP: NEGATIVE
KETONES UR QL STRIP.AUTO: NEGATIVE MG/DL
LEUKOCYTE ESTERASE UR QL STRIP.AUTO: ABNORMAL
LYMPHOCYTES # BLD: 0.6 K/UL
LYMPHOCYTES NFR BLD: 7 %
MAGNESIUM SERPL-MCNC: 2.3 MG/DL (ref 1.6–2.4)
MCH RBC QN AUTO: 32.4 PG (ref 26–34)
MCHC RBC AUTO-ENTMCNC: 34.9 G/DL (ref 30–36.5)
MCV RBC AUTO: 93 FL (ref 80–99)
MONOCYTES # BLD: 0.5 K/UL
MONOCYTES NFR BLD: 6 %
NEUTS SEG # BLD: 7.2 K/UL
NEUTS SEG NFR BLD: 86 %
NITRITE UR QL STRIP.AUTO: NEGATIVE
PH UR STRIP: 6.5 [PH] (ref 5–8)
PLATELET # BLD AUTO: 259 K/UL (ref 150–400)
POTASSIUM SERPL-SCNC: 2.8 MMOL/L (ref 3.5–5.1)
PROT SERPL-MCNC: 7.6 G/DL (ref 6.4–8.2)
PROT UR STRIP-MCNC: NEGATIVE MG/DL
RBC # BLD AUTO: 3.7 M/UL (ref 3.8–5.2)
RBC #/AREA URNS HPF: ABNORMAL /HPF (ref 0–5)
RBC MORPH BLD: ABNORMAL
SERVICE CMNT-IMP: NORMAL
SERVICE CMNT-IMP: NORMAL
SODIUM SERPL-SCNC: 140 MMOL/L (ref 136–145)
SP GR UR REFRACTOMETRY: 1.02 (ref 1–1.03)
UA: UC IF INDICATED,UAUC: ABNORMAL
UROBILINOGEN UR QL STRIP.AUTO: 1 EU/DL (ref 0.2–1)
WBC # BLD AUTO: 8.4 K/UL (ref 3.6–11)
WBC URNS QL MICRO: ABNORMAL /HPF (ref 0–4)

## 2017-11-09 PROCEDURE — 74011250637 HC RX REV CODE- 250/637: Performed by: EMERGENCY MEDICINE

## 2017-11-09 PROCEDURE — 99285 EMERGENCY DEPT VISIT HI MDM: CPT

## 2017-11-09 PROCEDURE — 83735 ASSAY OF MAGNESIUM: CPT | Performed by: EMERGENCY MEDICINE

## 2017-11-09 PROCEDURE — 36415 COLL VENOUS BLD VENIPUNCTURE: CPT | Performed by: EMERGENCY MEDICINE

## 2017-11-09 PROCEDURE — 81001 URINALYSIS AUTO W/SCOPE: CPT | Performed by: EMERGENCY MEDICINE

## 2017-11-09 PROCEDURE — 85025 COMPLETE CBC W/AUTO DIFF WBC: CPT | Performed by: EMERGENCY MEDICINE

## 2017-11-09 PROCEDURE — 82962 GLUCOSE BLOOD TEST: CPT

## 2017-11-09 PROCEDURE — 80053 COMPREHEN METABOLIC PANEL: CPT | Performed by: EMERGENCY MEDICINE

## 2017-11-09 RX ORDER — INSULIN DEGLUDEC 100 U/ML
INJECTION, SOLUTION SUBCUTANEOUS
Qty: 2 PEN | Refills: 0 | Status: SHIPPED | OUTPATIENT
Start: 2017-11-09 | End: 2018-08-21 | Stop reason: DRUGHIGH

## 2017-11-09 RX ORDER — POTASSIUM CHLORIDE 1.5 G/1.77G
40 POWDER, FOR SOLUTION ORAL
Status: COMPLETED | OUTPATIENT
Start: 2017-11-09 | End: 2017-11-09

## 2017-11-09 RX ORDER — POTASSIUM CHLORIDE 1500 MG/1
40 TABLET, FILM COATED, EXTENDED RELEASE ORAL DAILY
Qty: 20 TAB | Refills: 0 | Status: SHIPPED | OUTPATIENT
Start: 2017-11-09 | End: 2018-08-21

## 2017-11-09 RX ADMIN — POTASSIUM CHLORIDE 40 MEQ: 1.5 POWDER, FOR SOLUTION ORAL at 11:16

## 2017-11-09 NOTE — ED NOTES
Spoke with Lab because patient blood work was not in process yet. Lab stated that the CBC didn't have enough blood in it to run that we would need to redraw. Labs are been redrawn at this time.

## 2017-11-09 NOTE — ED PROVIDER NOTES
[facility] [address]  EMERGENCY DEPARTMENT HISTORY AND PHYSICAL EXAM       Date of Service: 11/9/2017   Patient Name: Maryse Herrera   YOB: 1940  Medical Record Number: [id number]    History of Presenting Illness     Chief Complaint   Patient presents with    Low Blood Sugar     Pt came in via EMS for c/o low blood sugar. BS was 24 when they got the patient. 76 on arrival here. History Provided By:  patient and EMS    Additional History:   Maryse Herrera is a 77 y.o. female with PMHx significant for DM, HTN, HCL, heart failure, and thyroid disease who presents via EMS to the ED for evaluation of altered mental status and hypoglycemia, as reported by EMS. EMS states that they initially received a call at 0500 this morning by the patient. Per EMS, the patient reported that her BG was low, and EMS offered transport to the ED, but the patient denied at that time. Per EMS, the patient re-called EMS PTA, and when EMS was on the scene, the patient's BG was 24. EMS reports that the patient received Glucagon IM, and her BG increased to 76 upon ED arrival. The patient states that she was recently started on 20 units of insulin degludec yesterday. The patient also states that she is prescribed a 70-30 Novolog Mix. The patient states that she ate breakfast this morning, and she felt normal prior to today. She also reports that she has returned to her mental baseline. Per medical records, the patient was recently evaluated in the ED on 11/4/17 for hypoglycemia. She notes that she has a PCP appointment in 4 days. Given the pt is currently asymptomatic, there is no applicable location, quality, duration, severity, timing, context, associated sxs, additional context, or modifying factors. The patient specifically denies fevers, chills, nausea, vomiting, abdominal pain, headaches, dysuria, frequency, or other complaints at this time.       Social Hx: - Tobacco, - EtOH, - Illicit Drugs    There are no other complaints, changes or physical findings at this time. Primary Care Provider: Brock Madrid MD     Past History     Past Medical History:   Past Medical History:   Diagnosis Date    [facility] [address]     Heart failure ([facility] [address]     unknown to family    Hypercholesteremia     Hypertension     Stroke ([facility] [address]     Thyroid disease         Past Surgical History:   Past Surgical History:   Procedure Laterality Date    HX GYN      HX HEENT      thyroidectomy    HX HYSTERECTOMY      REMOVAL GALLBLADDER      THYROIDECTOMY          Family History:   Family History   Problem Relation Age of Onset    Diabetes Father     No Known Problems Mother         Social History:   Social History   Substance Use Topics    Smoking status: Never Smoker    Smokeless tobacco: Never Used    Alcohol use No      Comment: been years        Allergies:   No Known Allergies      Review of Systems   Review of Systems   Constitutional: Negative for chills, fatigue and fever. HENT: Negative for congestion, rhinorrhea and sore throat. Eyes: Negative for pain, discharge and visual disturbance. Respiratory: Negative for cough, chest tightness, shortness of breath and wheezing. Cardiovascular: Negative for chest pain, palpitations and leg swelling. Gastrointestinal: Negative for abdominal pain, constipation, diarrhea, nausea and vomiting. Endocrine:        (+) Hypoglycemia   Genitourinary: Negative for dysuria, frequency and hematuria. Musculoskeletal: Negative for arthralgias, back pain and myalgias. Skin: Negative for rash. Neurological: Negative for dizziness, weakness, light-headedness and headaches. Psychiatric/Behavioral: Positive for confusion (AMS). Physical Exam  Physical Exam   Constitutional: She is oriented to person, place, and time. She appears well-developed and well-nourished. No distress. The pt has stable vital signs. HENT:   Head: Normocephalic and atraumatic.    Mucous membranes are moist.   Eyes: EOM are normal. Right eye exhibits no discharge. Left eye exhibits no discharge. No scleral icterus. Neck: Normal range of motion. Neck supple. No tracheal deviation present. Cardiovascular: Normal rate, regular rhythm, normal heart sounds and intact distal pulses. Exam reveals no gallop and no friction rub. No murmur heard. Pulmonary/Chest: Effort normal and breath sounds normal. No respiratory distress. She has no wheezes. She has no rales. Abdominal: Soft. She exhibits no distension. There is no tenderness. Musculoskeletal: Normal range of motion. She exhibits no edema. Lymphadenopathy:     She has no cervical adenopathy. Neurological: She is alert and oriented to person, place, and time. No focal neuro deficits   Skin: Skin is warm and dry. No rash noted. Psychiatric: She has a normal mood and affect. Nursing note and vitals reviewed. Medical Decision Making   I am the first provider for this patient. I reviewed the vital signs, available nursing notes, past medical history, past surgical history, family history and social history. Old Medical Records: Old medical records. Nursing notes. Provider Notes:   DDx: Hypoglycemia, AMS, medication non-compliance, electrolyte disturbance. 77 y.o. F presents to the ED with hypoglycemia and AMS. Pt was recently seen in the ED on 11/4 with similar presentation. The pt currently denies any complaints at this time. She is currently non-toxic appearing with stable vital signs. Will order blood work to evaluation for infections vs electrolyte disturbance, monitor BG, and will likely consult Endocrinology. Pt is currently back to her baseline mental status. ED Course:  10:04 AM   Initial assessment performed. The patients presenting problems have been discussed, and they are in agreement with the care plan formulated and outlined with them.   I have encouraged them to ask questions as they arise throughout their visit. Progress Note:  11:12 AM  The patient was re-evaluated, and she continues to be asymptomatic at this time. The patient and her son were updated on her available results, and they convey their understanding of these results. Will replete the patient's K and consult her PCP for medication adjustments. Written by Felix Sandoval ED Scribe, as dictated by Tigre Uriostegui MD.    Consult Note:  11:28 Elliot Lynch MD spoke with Elvia Virk MD,  Specialty: PCP  Discussed pt's hx, disposition, and available diagnostic and imaging results. Reviewed care plans. Consultant agrees with plans as outlined. Dr. Yony Aguilar recommends altering the patient's prescription of decludec from 20 units to 10 units. She can follow up with Dr. Yony Aguilar on Monday. Progress Note:  11:36 AM  The pt's repeat BG is 80. Pt is ready for discharge. She was informed of Dr. Richard Gabriel recommendations. Written by Felix Sandoval ED Scribe, as dictated by Tigre Uriostegui MD.    Diagnostic Study Results     Labs -      Recent Results (from the past 12 hour(s))   GLUCOSE, POC    Collection Time: 11/09/17  9:54 AM   Result Value Ref Range    Glucose (POC) 76 65 - 100 mg/dL    Performed by [address] [place] [address],[address], [facility]    Collection Time: 11/09/17 10:05 AM   Result Value Ref Range    Sodium 140 136 - 145 mmol/L    Potassium 2.8 (L) 3.5 - 5.1 mmol/L    Chloride 105 97 - 108 mmol/L    CO2 27 21 - 32 mmol/L    Anion gap 8 5 - 15 mmol/L    Glucose 55 (L) 65 - 100 mg/dL    BUN 20 6 - 20 MG/DL    Creatinine 0.83 0.55 - 1.02 MG/DL    BUN/Creatinine ratio 24 (H) 12 - 20      GFR est AA >60 >60 ml/min/1.73m2    GFR est non-AA >60 >60 ml/min/1.73m2    Calcium 8.6 8.5 - 10.1 MG/DL    Bilirubin, total 0.3 0.2 - 1.0 MG/DL    ALT (SGPT) 35 12 - 78 U/L    AST (SGOT) 27 15 - 37 U/L    Alk.  phosphatase 59 45 - 117 U/L    Protein, total 7.6 6.4 - 8.2 g/dL    Albumin 3.8 3.5 - 5.0 g/dL    Globulin 3.8 2.0 - 4.0 g/dL    A-G Ratio 1.0 (L) 1.1 - 2. 2     URINALYSIS W/ REFLEX CULTURE    Collection Time: 11/09/17 10:22 AM   Result Value Ref Range    Color YELLOW/STRAW      Appearance CLEAR CLEAR      Specific gravity 1.018 1.003 - 1.030      pH (UA) 6.5 5.0 - 8.0      Protein NEGATIVE  NEG mg/dL    Glucose 100 (A) NEG mg/dL    Ketone NEGATIVE  NEG mg/dL    Bilirubin NEGATIVE  NEG      Blood NEGATIVE  NEG      Urobilinogen 1.0 0.2 - 1.0 EU/dL    Nitrites NEGATIVE  NEG      Leukocyte Esterase SMALL (A) NEG      WBC 0-4 0 - 4 /hpf    RBC 0-5 0 - 5 /hpf    Epithelial cells FEW FEW /lpf    Bacteria NEGATIVE  NEG /hpf    UA:UC IF INDICATED CULTURE NOT INDICATED BY UA RESULT CNI         Vital Signs-Reviewed the patient's vital signs. Patient Vitals for the past 12 hrs:   Temp Pulse Resp BP SpO2   11/09/17 1115 - 77 18 159/59 100 %   11/09/17 1045 - 76 21 144/56 100 %   11/09/17 1015 - 84 18 154/65 100 %   11/09/17 0957 98.4 °F (36.9 °C) 92 23 155/65 100 %       Medications Given in the ED:  Medications   potassium chloride (KLOR-CON) packet 40 mEq (40 mEq Oral Given 11/9/17 1116)       Pulse Oximetry Analysis - Normal 100% on RA     Cardiac Monitor:   Rate: 86  Rhythm: Normal Sinus Rhythm       Diagnosis   Clinical Impression:   1. Hypokalemia    2.  Hypoglycemia         Plan:  1:   Follow-up Information     Follow up With Details Comments Contact Info    [facility] EMERGENCY DEPT   [address] [place] [address] Dr Lynda Goodwin MD  As needed, If symptoms worsen [address]  [phone]          2:   Current Discharge Medication List      CONTINUE these medications which have CHANGED    Details   insulin degludec (TRESIBA FLEXTOUCH U-100) 100 unit/mL (3 mL) inpn 10 units every morning  Qty: 2 Pen, Refills: 0         CONTINUE these medications which have NOT CHANGED    Details   !! BD INSULIN PEN NEEDLE UF SHORT 31 gauge x 5/16\" ndle USE AS DIRECTED TWICE A DAY  Qty: 60 Pen Needle, Refills: 11 pravastatin (PRAVACHOL) 40 mg tablet Take 1 Tab by mouth nightly. Qty: 30 Tab, Refills: 11      losartan-hydroCHLOROthiazide (HYZAAR) 100-25 mg per tablet TAKE 1 TABLET BY MOUTH DAILY  Qty: 30 Tab, Refills: 11      insulin aspart protamine/insulin aspart (NOVOLOG MIX 70-30) 100 unit/mL (70-30) injection 36 Units by SubCUTAneous route Daily (before breakfast). Qty: 10 mL, Refills: 11      clopidogrel (PLAVIX) 75 mg tab Take 1 Tab by mouth daily. Qty: 30 Tab, Refills: 11      fenofibrate nanocrystallized (TRICOR) 145 mg tablet TAKE 1 TABLET BY MOUTH EVERY DAY  Qty: 30 Tab, Refills: 11      !! BD INSULIN PEN NEEDLE UF SHORT 31 gauge x 5/16\" ndle USE AS DIRECTED TWICE A DAY  Refills: 11      levothyroxine (SYNTHROID) 150 mcg tablet TAKE 1 TABLET BY MOUTH EVERY DAY  Qty: 30 Tab, Refills: 11      amLODIPine (NORVASC) 10 mg tablet TAKE 1 TABLET BY MOUTH EVERY MORNING  Qty: 30 Tab, Refills: 11      brimonidine (ALPHAGAN) 0.15 % ophthalmic solution Administer 1 Drop to both eyes two (2) times a day. latanoprost (XALATAN) 0.005 % ophthalmic solution Administer 1 Drop to both eyes nightly. !! - Potential duplicate medications found. Please discuss with provider. Return to ED if Worse    Disposition Note:  Discharge Note:  11:32 AM  The pt is ready for discharge. The pt's signs, symptoms, diagnosis, and discharge instructions have been discussed and pt has conveyed their understanding. The pt is to follow up as recommended or return to ER should their symptoms worsen. Plan has been discussed and pt is in agreement. This note is prepared by Ananth Stubbs, acting as a Scribe for Rome Cox MD.    Rome Cox MD: The scribe's documentation has been prepared under my direction and personally reviewed by me in its entirety. I confirm that the notes above accurately reflects all work, treatment, procedures, and medical decision making performed by me. This note will not be viewable in [address]

## 2017-11-09 NOTE — DISCHARGE INSTRUCTIONS
Hypokalemia: Care Instructions  Your Care Instructions    Hypokalemia (say \"mv-ph-cyr-ALONZO-zhanna-uh\") is a low level of potassium. The heart, muscles, kidneys, and nervous system all need potassium to work well. This problem has many different causes. Kidney problems, diet, and medicines like diuretics and laxatives can cause it. So can vomiting or diarrhea. In some cases, cancer is the cause. Your doctor may do tests to find the cause of your low potassium levels. You may need medicines to bring your potassium levels back to normal. You may also need regular blood tests to check your potassium. If you have very low potassium, you may need intravenous (IV) medicines. You also may need tests to check the electrical activity of your heart. Heart problems caused by low potassium levels can be very serious. Follow-up care is a key part of your treatment and safety. Be sure to make and go to all appointments, and call your doctor if you are having problems. It's also a good idea to know your test results and keep a list of the medicines you take. How can you care for yourself at home? · If your doctor recommends it, eat foods that have a lot of potassium. These include fresh fruits, juices, and vegetables. They also include nuts, beans, and milk. · Be safe with medicines. If your doctor prescribes medicines or potassium supplements, take them exactly as directed. Call your doctor if you have any problems with your medicines. · Get your potassium levels tested as often as your doctor tells you. When should you call for help? Call 911 anytime you think you may need emergency care. For example, call if:  ? · You feel like your heart is missing beats. Heart problems caused by low potassium can cause death. ? · You passed out (lost consciousness). ? · You have a seizure. ?Call your doctor now or seek immediate medical care if:  ? · You feel weak or unusually tired.    ? · You have severe arm or leg cramps. ? · You have tingling or numbness. ? · You feel sick to your stomach, or you vomit. ? · You have belly cramps. ? · You feel bloated or constipated. ? · You have to urinate a lot. ? · You feel very thirsty most of the time. ? · You are dizzy or lightheaded, or you feel like you may faint. ? · You feel depressed, or you lose touch with reality. ? Watch closely for changes in your health, and be sure to contact your doctor if:  ? · You do not get better as expected. Where can you learn more? Go to http://toi-sunil.info/. Enter G358 in the search box to learn more about \"Hypokalemia: Care Instructions. \"  Current as of: May 12, 2017  Content Version: 11.4  © 8731-1368 [employer]. Care instructions adapted under license by [employer] (which disclaims liability or warranty for this information). If you have questions about a medical condition or this instruction, always ask your healthcare professional. [address] any warranty or liability for your use of this information. Hypoglycemia: Care Instructions  Your Care Instructions    Hypoglycemia means that your blood sugar is low and your body is not getting enough fuel. Some people get low blood sugar from not eating often enough. Some medicines to treat diabetes can cause low blood sugar. People who have had surgery on their stomachs or intestines may get hypoglycemia. Problems with the pancreas, kidneys, or liver also can cause low blood sugar. A snack or drink with sugar in it will raise your blood sugar and should ease your symptoms right away. Your doctor may recommend that you change or stop your medicines until you can get your blood sugar levels under control. In the long run, you may need to change your diet and eating habits so that you get enough fuel for your body throughout the day. Follow-up care is a key part of your treatment and safety.  Be sure to make and go to all appointments, and call your doctor if you are having problems. It's also a good idea to know your test results and keep a list of the medicines you take. How can you care for yourself at home? · Learn to recognize the early signs of low blood sugar. Signs include:  ¨ Nausea. ¨ Hunger. ¨ Feeling nervous, irritable, or shaky. ¨ Cold, clammy, wet skin. ¨ Sweating (when you are not exercising). ¨ A fast heartbeat. ¨ Numbness or tingling of the fingertips or lips. · If you feel an episode of low blood sugar coming on, drink fruit juice or sugared (not diet) soda, or eat sugar in the form of candy, cubes, or tablets. [employer] are another [employer]. · Eat small, frequent meals so that you do not get too hungry between meals. · Balance extra exercise with eating more. · Keep a written record of your low blood sugar episodes, including when you last ate and what you ate, so that you can learn what causes your blood sugar to drop. · Make sure your family, friends, and coworkers know the symptoms of low blood sugar and know what to do to get your sugar level up. · Wear medical alert jewelry that lists your condition. You can buy this at most drugstores. When should you call for help? Call 911 anytime you think you may need emergency care. For example, call if:  ? · You passed out (lost consciousness). ? · You are confused or cannot think clearly. ? · Your blood sugar is very high or very low. ? Watch closely for changes in your health, and be sure to contact your doctor if:  ? · Your blood sugar stays outside the level your doctor set for you. ? · You have any problems. Where can you learn more? Go to http://toi-sunil.info/. Enter Z121 in the search box to learn more about \"Hypoglycemia: Care Instructions. \"  Current as of: March 13, 2017  Content Version: 11.4  © 5100-0629 [employer].  Care instructions adapted under license by [facility] (which disclaims liability or warranty for this information). If you have questions about a medical condition or this instruction, always ask your healthcare professional. [address] any warranty or liability for your use of this information. Electrolyte Imbalance: Care Instructions  Your Care Instructions  Electrolytes are minerals in your blood. They include sodium, potassium, calcium, and magnesium. When they are not at the right levels, you can feel very ill. You may not know what is causing it, but you know something is wrong. You may feel weak or numb, have muscle spasms, or twitch. Your heart may beat fast. Symptoms are different with each mineral. Too much is as bad as too little. Minerals help keep your body working as it should. Vomiting, diarrhea, and fever can cause you to lose minerals. A problem with your kidneys can tip a mineral out of balance. So can taking certain medicines. Your doctor may do more tests. He or she may change your medicine and diet. If you are low in one or more minerals, they may be given through a tube into your vein (IV). Your doctor may have you take or drink special fluids or pills. If your kidneys are failing, your blood may be filtered. This is called dialysis. It can put the minerals back in balance. Follow-up care is a key part of your treatment and safety. Be sure to make and go to all appointments, and call your doctor if you are having problems. It's also a good idea to know your test results and keep a list of the medicines you take. How can you care for yourself at home? · Take your medicines exactly as prescribed. Call your doctor if you have any problems with your medicines. You will get more details on the specific medicines your doctor prescribes. · Do not take any medicine without talking to your doctor first. This includes prescription, over-the-counter, and herbal medicines.   · If you have kidney, heart, or liver disease and have to limit fluids, talk with your doctor before you increase the amount of fluids you drink. · Your doctor or dietitian may give you a diet plan to help balance your minerals. Follow the diet carefully. When should you call for help? Call 911 anytime you think you may need emergency care. For example, call if:  ? · You passed out (lost consciousness). ? · Your heartbeat seems to be irregular. It might be speeding up and then slowing down or skipping beats. ?Call your doctor now or seek immediate medical care if:  ? · You have muscle aches. ? · You feel very weak. ? · You are confused or cannot think clearly. ? Watch closely for changes in your health, and be sure to contact your doctor if:  ? · You do not get better as expected. Where can you learn more? Go to http://toi-sunil.info/. Enter B722 in the search box to learn more about \"Electrolyte Imbalance: Care Instructions. \"  Current as of: May 12, 2017  Content Version: 11.4  © 4382-6074 [employer]. Care instructions adapted under license by [employer] (which disclaims liability or warranty for this information). If you have questions about a medical condition or this instruction, always ask your healthcare professional. [address] any warranty or liability for your use of this information. Learning About Low Blood Sugar (Hypoglycemia) in Diabetes  What is low blood sugar (hypoglycemia)? Hypoglycemia means that your blood sugar is low and your body (especially your brain) is not getting enough fuel. If you have diabetes, your blood sugar can go too low if you take too much of some diabetes medicines. It can also go too low if you miss a meal. And it can happen if you exercise too hard without eating enough food. Some medicines used to treat other health problems can cause low blood sugar too. What are the symptoms?   Symptoms of low blood sugar can start quickly. It may take just 10 to 15 minutes. If you have had diabetes for many years, you may not realize that your blood sugar is low until it drops very low. · If your blood sugar level drops below 70 (mild low blood sugar), you may feel tired, anxious, dizzy, weak, shaky, or sweaty. You may have a fast heartbeat or blurry vision. · If your blood sugar level continues to drop (usually below 40), your behavior may change. You may feel more irritable. You may find it hard to concentrate or talk. And you may feel unsteady when you stand or walk. You may become too weak or confused to eat something with sugar to raise your blood sugar level. · If your blood sugar level drops very low (usually below 20), you may pass out (lose consciousness). Or you may have a seizure or stroke. If you have symptoms of severe low blood sugar, you need to get medical care right away. If you had a low blood sugar level during the night, you may wake up tired or with a headache. Or you may sweat so much during the night that your pajamas or sheets are damp when you wake up. How is low blood sugar treated? You can treat low blood sugar by eating or drinking something that has 15 grams of carbohydrate. These should be quick-sugar foods. Check your blood sugar level again 15 minutes after having a quick-sugar food to make sure your level is getting back to your target range. Here are examples of quick-sugar foods that have 15 grams of carbohydrate:  · 3 to 4 glucose tablets  · 1 tube of glucose gel  · Hard candy (such as 3 Jolly Ranchers or 5 to 7 Life Savers)  · 1 tablespoon honey  · 2 tablespoons of raisins  · ½ cup to ¾ cup (4 to 6 ounces) of fruit juice or regular (not diet) soda  · 1 tablespoon of sugar  · 1 cup of fat-free milk  If you have problems with severe low blood sugar, someone else may have to give you a shot of glucagon. This is a hormone that raises blood sugar levels quickly.   How can you prevent low blood sugar?  You can take steps to prevent low blood sugar. · Follow your treatment plan. Take your insulin or other diabetes medicine exactly as your doctor prescribed it. Talk with your doctor if you're having low blood sugar often. Your medicine may need to be adjusted if it's causing your low blood sugar. · Check your blood sugar levels often. This helps you find early changes before an emergency happens. · Keep a quick-sugar food with you in case your blood sugar level drops low. · Eat small meals more often so that you don't get too hungry between meals. Don't skip meals. · Balance extra exercise with eating more. Check your blood sugar and learn how it changes after exercise. If your blood sugar stays at a normal level, you may not need to eat after you exercise. · Limit how much alcohol you drink. Alcohol can make low blood sugar go even lower. Don't drink alcohol if you have problems recognizing the early signs of low blood sugar. · Keep a diary of your symptoms. This helps you learn when changes in your body may signal low blood sugar. And keep track of how often you have low blood sugar, including when you last ate and what you ate. This will help you learn what causes your blood sugar to drop. · Learn about diabetes and low blood sugar. Support groups or a diabetes education center can help you understand how medicines, diet, and exercise affect your blood sugar levels. Since low blood sugar levels can quickly become an emergency, be sure to wear medical alert jewelry, such as a medical alert bracelet. This is to let people know you have diabetes so they can get help for you. You can buy this at most drugstores. And make sure your family, friends, and coworkers know the symptoms of low blood sugar. Teach them what to do to get your sugar level up. Follow-up care is a key part of your treatment and safety. Be sure to make and go to all appointments, and call your doctor if you are having problems. It's also a good idea to know your test results and keep a list of the medicines you take. Where can you learn more? Go to http://toi-sunil.info/. Enter R918 in the search box to learn more about \"Learning About Low Blood Sugar (Hypoglycemia) in Diabetes. \"  Current as of: March 13, 2017  Content Version: 11.4  © 6684-9925 [employer]. Care instructions adapted under license by [employer] (which disclaims liability or warranty for this information). If you have questions about a medical condition or this instruction, always ask your healthcare professional. [address] any warranty or liability for your use of this information. Hypokalemia: Care Instructions  Your Care Instructions    Hypokalemia (say \"iy-pt-[id number]-ALONZO-zhanna-uh\") is a low level of potassium. The heart, muscles, kidneys, and nervous system all need potassium to work well. This problem has many different causes. Kidney problems, diet, and medicines like diuretics and laxatives can cause it. So can vomiting or diarrhea. In some cases, cancer is the cause. Your doctor may do tests to find the cause of your low potassium levels. You may need medicines to bring your potassium levels back to normal. You may also need regular blood tests to check your potassium. If you have very low potassium, you may need intravenous (IV) medicines. You also may need tests to check the electrical activity of your heart. Heart problems caused by low potassium levels can be very serious. Follow-up care is a key part of your treatment and safety. Be sure to make and go to all appointments, and call your doctor if you are having problems. It's also a good idea to know your test results and keep a list of the medicines you take. How can you care for yourself at home? · If your doctor recommends it, eat foods that have a lot of potassium. These include fresh fruits, juices, and vegetables.  They also include nuts, beans, and milk. · Be safe with medicines. If your doctor prescribes medicines or potassium supplements, take them exactly as directed. Call your doctor if you have any problems with your medicines. · Get your potassium levels tested as often as your doctor tells you. When should you call for help? Call 911 anytime you think you may need emergency care. For example, call if:  ? · You feel like your heart is missing beats. Heart problems caused by low potassium can cause death. ? · You passed out (lost consciousness). ? · You have a seizure. ?Call your doctor now or seek immediate medical care if:  ? · You feel weak or unusually tired. ? · You have severe arm or leg cramps. ? · You have tingling or numbness. ? · You feel sick to your stomach, or you vomit. ? · You have belly cramps. ? · You feel bloated or constipated. ? · You have to urinate a lot. ? · You feel very thirsty most of the time. ? · You are dizzy or lightheaded, or you feel like you may faint. ? · You feel depressed, or you lose touch with reality. ? Watch closely for changes in your health, and be sure to contact your doctor if:  ? · You do not get better as expected. Where can you learn more? Go to http://toi-sunil.info/. Enter G358 in the search box to learn more about \"Hypokalemia: Care Instructions. \"  Current as of: May 12, 2017  Content Version: 11.4  © 5332-3801 [employer]. Care instructions adapted under license by [employer] (which disclaims liability or warranty for this information). If you have questions about a medical condition or this instruction, always ask your healthcare professional. [address] any warranty or liability for your use of this information. Thank you! Thank you for allowing us to provide you with excellent care today. We hope we addressed all of your concerns and needs.  We strive to provide excellent quality care in the Emergency Department. You will receive a survey after your visit to evaluate the care you were provided. Please rate us a level 5 (excellent), as anything less than excellent does not meet our goals. If you feel that you have not received excellent quality care or timely care, please ask to speak to the nurse manager. Please choose us in the future for your continued health care needs. ------------------------------------------------------------------------------------------------------------  The exam and treatment you received in the Emergency Department were for an urgent problem and are not intended as complete care. It is important that you follow up with a doctor, nurse practitioner, or physician assistant for ongoing care. If your symptoms become worse or you do not improve as expected and you are unable to reach your usual health care provider, you should return to the Emergency Department. We are available 24 hours a day. Please take your discharge instructions with you when you go to your follow-up appointment. If you have any problem arranging a follow-up appointment, contact the Emergency Department immediately. If a prescription has been provided, please have it filled as soon as possible to prevent a delay in treatment. Read the entire medication instruction sheet provided to you by the pharmacy. If you have any questions or reservations about taking the medication due to side effects or interactions with other medications, please call your primary care physician or contact the ER to speak with the charge nurse. Make an appointment with your family doctor or the physician you were referred to for follow-up of this visit as instructed on your discharge paperwork, as this is mandatory follow-up. Return to the ER if you are unable to be seen or if you are unable to be seen in a timely manner.     If you have any problem arranging the follow-up visit, contact the Emergency Department immediately.

## 2017-11-09 NOTE — ED NOTES
Pt came in via EMS for complaints of low blood sugar. EMS stated they went to patient house this morning for a call for low blood sugar and were able to get BS up and then received another call and this time brought the patient in. Pt was altered when they arrived but is A/o now.  BS was 76 on arrival

## 2017-11-10 ENCOUNTER — PATIENT OUTREACH (OUTPATIENT)
Dept: INTERNAL MEDICINE CLINIC | Age: 77
End: 2017-11-10

## 2017-11-10 ENCOUNTER — OFFICE VISIT (OUTPATIENT)
Dept: INTERNAL MEDICINE CLINIC | Age: 77
End: 2017-11-10

## 2017-11-10 VITALS
HEIGHT: 63 IN | BODY MASS INDEX: 31.17 KG/M2 | DIASTOLIC BLOOD PRESSURE: 70 MMHG | WEIGHT: 175.9 LBS | RESPIRATION RATE: 18 BRPM | OXYGEN SATURATION: 100 % | TEMPERATURE: 98 F | HEART RATE: 75 BPM | SYSTOLIC BLOOD PRESSURE: 149 MMHG

## 2017-11-10 DIAGNOSIS — E11.9 TYPE 2 DIABETES MELLITUS WITHOUT COMPLICATION, WITH LONG-TERM CURRENT USE OF INSULIN (HCC): Primary | ICD-10-CM

## 2017-11-10 DIAGNOSIS — I10 ESSENTIAL HYPERTENSION: ICD-10-CM

## 2017-11-10 DIAGNOSIS — Z79.4 TYPE 2 DIABETES MELLITUS WITHOUT COMPLICATION, WITH LONG-TERM CURRENT USE OF INSULIN (HCC): Primary | ICD-10-CM

## 2017-11-10 DIAGNOSIS — E03.2 HYPOTHYROIDISM DUE TO MEDICATION: ICD-10-CM

## 2017-11-10 DIAGNOSIS — E78.5 DYSLIPIDEMIA: ICD-10-CM

## 2017-11-10 NOTE — PROGRESS NOTES
NNTOCED  [facility]  2017:  Hypokalemia/Hypoglycemia  Hospital Discharge Follow-Up      Date/Time:  11/10/2017 11:39 AM    Patient listed on discharge [facility]) report on 2017. Patient discharged from [facility] for above. Low Blood Sugar Pt came in via EMS for c/o low blood sugar. BS was 24 when they got the patient. 76 on arrival here.  [id number] [facility] ED Note:  77 y.o. female with PMHx significant for DM, HTN, HCL, heart failure, and thyroid disease who presents via EMS to the ED for evaluation of altered mental status and hypoglycemia, as reported by EMS. EMS states that they initially received a call at 0500 this morning by the patient. Per EMS, the patient reported that her BG was low, and EMS offered transport to the ED, but the patient denied at that time. Per EMS, the patient re-called EMS PTA, and when EMS was on the scene, the patient's BG was 24. EMS reports that the patient received Glucagon IM, and her BG increased to 76 upon ED arrival. The patient states that she was recently started on 20 units of insulin degludec yesterday. The patient also states that she is prescribed a 70-30 Novolog Mix. The patient states that she ate breakfast this morning, and she felt normal prior to today. She also reports that she has returned to her mental baseline. Per medical records, the patient was recently evaluated in the ED on 17 for hypoglycemia. She notes that she has a PCP appointment in 4 days. Psychiatric/Behavioral: Positive for confusion (AMS).      RRAT score:  12 . High    Medical History:     Past Medical History:   Diagnosis Date    Diabetes ([address]     Heart failure ([address]     unknown to family    Hypercholesteremia     Hypertension     St[facility] ([facility]     Thyroid disease      Nurse Navigator(NN) contacted the patient by telephone to perform post [address] [facility] ED discharge assessment. Verified  and address with patient as identifiers.   Provided introduction to self, and explanation of the Nurses Navigator role. Patient informed NN the ambulance was called again this morning:  (See ED note above). Patient informed NN she had not checked BS this morning, nor had she taken medications today. Patient advised to eat a meal as ordered by PCP with medication administrations. (12:20PM:  Patient called NN to report BS 80)    Consult done w/ PCP:  Patient directed to come in to the office. NN contacted patient as call #2 per PCP; patient agreed to come in for office visit ASAP today. Diet: Patient reports: Renal Diet  (diabetic diet prior to low BS encounters). Patient states she is eating right. Activity:    Patient reports: active--informed NN on ALEKSANDAR 11/9 call of plans to go to [facility] today; however, due to \"not feeling well\" will be brought in. Medication: Performed diabetic medication reconciliation only with patient, and patient verbalizes understanding of administration of home medications; states she has not taken medications today. There were no barriers to obtaining medications identified at this time. Support system:  patient and son    Discharge Instructions :  Reviewed discharge instructions with patient. Patient verbalizes understanding of discharge instructions and follow-up care. Agreed to come to office today due to acute Sx. Red Flags:  Confusion. Hypoglycemia    Labs Reviewed:  Recent Labs      11/09/17   1114   WBC  8.4   HGB  12.0   HCT  34.4*   PLT  259     Recent Labs      11/09/17   1005   NA  140   K  2.8*   CL  105   CO2  27   GLU  55*   BUN  20   CREA  0.83   CA  8.6   MG  2.3   ALB  3.8   SGOT  27   ALT  35   BUN/Creat 24  Gluc 55  U/A:  Gluc 100A  Leukocyte Sm A    Advance Care Planning:   Patient was offered the opportunity to discuss advance care planning:  No-the focus of the call is on the acute symptoms in prevention an ED visit; scheduled to come into the office.        Does patient have an Advance Directive:  no   If no, did you provide information on Caring Connections?  no     PCP/Specialist follow up:   Patient scheduled to follow up with Barb Zambrano MD on 11/10/2017 urgent visit. Patient called back to report BS level 89. Patient given an opportunity to ask questions. No other clinical/social/functional needs noted. The patient agrees to contact the PCP office for questions related to their healthcare. The patient expressed thanks, offered no additional questions and ended the call. Case management Impression/ plan:    Goals Addressed             Most Recent     Attends follow-up appointments as directed. On track (11/10/2017)             Pt will follow up as scheduled with Dr Junior Avila on 11/14. Patient refused to up office appointment until reviewing schedule for new appointments.  Understands red flags post discharge. Not on track (11/10/2017)             Pt has agreed to contact PCP with any sxs of hyper/hypoglycemia. Patient sound weak compared to [place] call 2 days ago. Patient stated ambulance was there early this morning. PCP advised patient to come to office immediately today. Goal addressed:  Patient will verbalize understanding of self -management goals of living with Diabetes. Presently, patient states she doesn't know what to do with the new diabetic medications. PCP ordered patient to come in to office today to [facility]. Patient will attend urgent office visit today. Goal completion:  11/9.

## 2017-11-10 NOTE — MR AVS SNAPSHOT
Visit Information Date & Time Provider Department Dept. Phone Encounter #  
 11/10/2017  2:45 PM Ameena hTomason MD [facility] [phone] [id number] Your Appointments 11/13/2017 12:15 PM  
Any with Ameena Thomason MD  
[address] [place] [address] and [facility] Appt Note: post ED discharge-hypoglycemia  
 [address]  
  
   
 [address] [id number]  
  
    
 11/14/2017  4:30 PM  
Any with Ameena Thomason MD  
[address] [place] [address] and [facility] Appt Note: 1 month follow up  
 [address]  
  
    
 11/24/2017  1:00 PM  
Any with Ameena Thomason MD  
[address] [place] [address] and [facility] Appt Note: 1 week f/u  
 [address]  
[phone] Upcoming Health Maintenance Date Due  
 FOOT EXAM Q1 5/8/2016 EYE EXAM RETINAL OR DILATED Q1 5/8/2016 GLAUCOMA SCREENING Q2Y 5/8/2017 MICROALBUMIN Q1 7/14/2017 LIPID PANEL Q1 7/14/2017 Pneumococcal 65+ Low/Medium Risk (2 of 2 - PPSV23) 11/11/2017 HEMOGLOBIN A1C Q6M 4/12/2018 MEDICARE YEARLY EXAM 10/13/2018 DTaP/Tdap/Td series (2 - Td) 2/2/2027 Allergies as of 11/10/2017  Review Complete On: 11/10/2017 By: Kelby Landin LPN No Known Allergies Current Immunizations  Reviewed on 11/15/2012 Name Date Influenza Vaccine Split  Deferred (Patient Refused) ZZZ-RETIRED (DO NOT USE) Pneumococcal Vaccine (Unspecified Type) 11/11/2012 12:57 PM  
  
 Not reviewed this visit Vitals BP Pulse Temp Resp Height(growth percentile) Weight(growth percentile) 149/70 75 98 °F (36.7 °C) (Oral) 18 5' 3\" (1.6 m) 175 lb 14.4 oz (79.8 kg) SpO2 BMI OB Status Smoking Status 100% 31.16 kg/m2 Hysterectomy Never Smoker Vitals History BMI and BSA Data Body Mass Index Body Surface Area  
 31.16 kg/m 2 1.88 m 2 Preferred Pharmacy Pharmacy Name Phone [id number] [address] [phone] Your Updated Medication List  
  
   
This list is accurate as of: 11/10/17  3:27 PM.  Always use your most recent med list. amLODIPine 10 mg tablet Commonly known as:  Joseline Spruce TAKE 1 TABLET BY MOUTH EVERY MORNING  
  
 * BD INSULIN PEN NEEDLE UF SHORT 31 gauge x 5/16\" Ndle Generic drug:  Insulin Needles (Disposable) USE AS DIRECTED TWICE A DAY * BD INSULIN PEN NEEDLE UF SHORT 31 gauge x 5/16\" Ndle Generic drug:  Insulin Needles (Disposable) USE AS DIRECTED TWICE A DAY  
  
 brimonidine 0.15 % ophthalmic solution Commonly known as:  Mylene Mainland Administer 1 Drop to both eyes two (2) times a day. clopidogrel 75 mg Tab Commonly known as:  PLAVIX Take 1 Tab by mouth daily. fenofibrate nanocrystallized 145 mg tablet Commonly known as:  TRICOR  
TAKE 1 TABLET BY MOUTH EVERY DAY  
  
 insulin aspart protamine/insulin aspart 100 unit/mL (70-30) injection Commonly known as:  NovoLOG Mix 70-30  
36 Units by SubCUTAneous route Daily (before breakfast). insulin degludec 100 unit/mL (3 mL) Inpn Commonly known as:  TRESIBA FLEXTOUCH U-100  
10 units every morning  
  
 latanoprost 0.005 % ophthalmic solution Commonly known as:  Carl Jumbo Administer 1 Drop to both eyes nightly. levothyroxine 150 mcg tablet Commonly known as:  SYNTHROID  
TAKE 1 TABLET BY MOUTH EVERY DAY  
  
 losartan-hydroCHLOROthiazide 100-25 mg per tablet Commonly known as:  HYZAAR  
TAKE 1 TABLET BY MOUTH DAILY potassium chloride SR 20 mEq tablet Commonly known as:  K-TAB Take 2 Tabs by mouth daily. pravastatin 40 mg tablet Commonly known as:  PRAVACHOL Take 1 Tab by mouth nightly. * Notice:   This list has 2 medication(s) that are the same as other medications prescribed for you. Read the directions carefully, and ask your doctor or other care provider to review them with you. Introducing [facility] & HE[facility]! Olivier Efrain introduces "[employer] patient portal. Now you can access parts of your medical record, email your doctor's office, and request medication refills online. 1. In your internet browser, go to [email or web address] 2. Click on the First Time User? Click Here link in the Sign In box. You will see the New Member Sign Up page. 3. Enter your "[employer] Access Code exactly as it appears below. You will not need to use this code after youve completed the sign-up process. If you do not sign up before the expiration date, you must request a new code. · "[employer] Access Code: [email or web address] Expires: 11/11/2017  2:09 PM 
 
4. Enter the last four digits of your Social Security Number (xxxx) and Date of Birth (mm/dd/yyyy) as indicated and click Submit. You will be taken to the next sign-up page. 5. Create a "[employer] ID. This will be your "[employer] login ID and cannot be changed, so think of one that is secure and easy to remember. 6. Create a "[employer] password. You can change your password at any time. 7. Enter your Password Reset Question and Answer. This can be used at a later time if you forget your password. 8. Enter your e-mail address. You will receive e-mail notification when new information is available in [address] 9. Click Sign Up. You can now view and download portions of your medical record. 10. Click the Download Summary menu link to download a portable copy of your medical information. If you have questions, please visit the Frequently Asked Questions section of the "[employer] website. Remember, "[employer] is NOT to be used for urgent needs. For medical emergencies, dial 911. Now available from your iPhone and Android! Please provide this summary of care documentation to your next provider. Your primary care clinician is listed as KrystenRayne Doll. If you have any questions after today's visit, please call [phone].

## 2017-11-10 NOTE — PROGRESS NOTES
1. Have you been to the ER, urgent care clinic since your last visit? Hospitalized since your last visit? Yes When: 11-09-17 Where: [facility] Reason for visit: fainted    2. Have you seen or consulted any other health care providers outside of the [address] [place] [address] since your last visit? Include any pap smears or colon screening.  No

## 2017-11-11 NOTE — PROGRESS NOTES
[address] [place] [address] and Primary Care  [place] [address]  [address] [place] [address]  Phone:  [phone]  Fax: [phone]       Chief Complaint   Patient presents with    Hypertension     f/u   . SUBJECTIVE:    Malou Olivera is a 77 y.o. female Comes in for return visit having had multiple hypoglycemic reactions. I transitioned her to [place]. Interestingly after reducing the dose to 10 units, she still has reactions that day in spite of eating in a timely fashion. She feels fine currently. Her emergency room visits are not revealing any primary infections or maladies that in any way induce hypoglycemia. She denies any shortness of breath, orthopnea or PND. She has a past history of primary hypertension, dyslipidemia and hypothyroidism. Current Outpatient Prescriptions   Medication Sig Dispense Refill    insulin degludec (TRESIBA FLEXTOUCH U-100) 100 unit/mL (3 mL) inpn 10 units every morning 2 Pen 0    potassium chloride SR (K-TAB) 20 mEq tablet Take 2 Tabs by mouth daily. 20 Tab 0    BD INSULIN PEN NEEDLE UF SHORT 31 gauge x 5/16\" ndle USE AS DIRECTED TWICE A DAY 60 Pen Needle 11    pravastatin (PRAVACHOL) 40 mg tablet Take 1 Tab by mouth nightly. 30 Tab 11    losartan-hydroCHLOROthiazide (HYZAAR) 100-25 mg per tablet TAKE 1 TABLET BY MOUTH DAILY 30 Tab 11    clopidogrel (PLAVIX) 75 mg tab Take 1 Tab by mouth daily. 30 Tab 11    fenofibrate nanocrystallized (TRICOR) 145 mg tablet TAKE 1 TABLET BY MOUTH EVERY DAY 30 Tab 11    BD INSULIN PEN NEEDLE UF SHORT 31 gauge x 5/16\" ndle USE AS DIRECTED TWICE A DAY  11    levothyroxine (SYNTHROID) 150 mcg tablet TAKE 1 TABLET BY MOUTH EVERY DAY 30 Tab 11    amLODIPine (NORVASC) 10 mg tablet TAKE 1 TABLET BY MOUTH EVERY MORNING 30 Tab 11    brimonidine (ALPHAGAN) 0.15 % ophthalmic solution Administer 1 Drop to both eyes two (2) times a day.       latanoprost (XALATAN) 0.005 % ophthalmic solution Administer 1 Drop to both eyes nightly.  insulin aspart protamine/insulin aspart (NOVOLOG MIX 70-30) 100 unit/mL (70-30) injection 36 Units by SubCUTAneous route Daily (before breakfast).  10 mL 11     Past Medical History:   Diagnosis Date    [facility]     Heart failure ([facility]     unknown to family    Hypercholesteremia     Hypertension     Stroke ([facility]     Thyroid disease      Past Surgical History:   Procedure Laterality Date    HX GYN      HX HEENT      thyroidectomy    HX HYSTERECTOMY      REMOVAL GALLBLADDER      THYROIDECTOMY       No Known Allergies      REVIEW OF SYSTEMS:  General: negative for - chills or fever  ENT: negative for - headaches, nasal congestion or tinnitus  Respiratory: negative for - cough, hemoptysis, shortness of breath or wheezing  Cardiovascular : negative for - chest pain, edema, palpitations or shortness of breath  Gastrointestinal: negative for - abdominal pain, blood in stools, heartburn or nausea/vomiting  Genito-Urinary: no dysuria, trouble voiding, or hematuria  Musculoskeletal: negative for - gait disturbance, joint pain, joint stiffness or joint swelling  Neurological: no TIA or stroke symptoms  Hematologic: no bruises, no bleeding, no swollen glands  Integument: no lumps, mole changes, nail changes or rash  Endocrine: no malaise/lethargy or unexpected weight changes      Social History     Social History    Marital status:      Spouse name: N/A    Number of children: 1    Years of education: N/A     Occupational History    retired      Social History Main Topics    Smoking status: Never Smoker    Smokeless tobacco: Never Used    Alcohol use No      Comment: been years    Drug use: No    Sexual activity: Not Currently     Other Topics Concern    None     Social History Narrative     Family History   Problem Relation Age of Onset    Diabetes Father     No Known Problems Mother        OBJECTIVE:    Visit Vitals    /70    Pulse 75    Temp 98 °F (36.7 °C) (Oral)    Resp 18    Ht 5' 3\" (1.6 m)    Wt 175 lb 14.4 oz (79.8 kg)    SpO2 100%    BMI 31.16 kg/m2     CONSTITUTIONAL: well , well nourished, appears age appropriate  EYES: perrla, eom intact  ENMT:moist mucous membranes, pharynx clear  NECK: supple. Thyroid normal  RESPIRATORY: Chest: clear to ascultation and percussion   CARDIOVASCULAR: Heart: regular rate and rhythm  GASTROINTESTINAL: Abdomen: soft, bowel sounds active  HEMATOLOGIC: no pathological lymph nodes palpated  MUSCULOSKELETAL: Extremities: no edema, pulse 1+   INTEGUMENT: No unusual rashes or suspicious skin lesions noted. Nails appear normal.  NEUROLOGIC: non-focal exam   MENTAL STATUS: alert and oriented, appropriate affect      ASSESSMENT:  1. Type 2 diabetes mellitus without complication, with long-term current use of insulin ([address]    2. Dyslipidemia    3. Essential hypertension    4. Hypothyroidism due to medication        PLAN:    1. From a diabetic perspective, the patient will remain on Tresiba. I think this is much safer for her. She will start 5 units and take this every other day for now. A gradual titration will have to occur. I will not place her on any postprandial coverage for now. The propensity of hypoglycemia is very high. I remind her of the importance of eating basically every four hours in waking hours. 2. She will continue statin as prescribed in view of her primary cardiovascular risk prevention status. 3. Blood pressure is excellent today. 4. She will continue her thyroid supplement. Her last TSH was excellent. Follow-up Disposition:  Return in about 2 weeks (around 11/24/2017).       Tere Howell MD

## 2017-11-16 ENCOUNTER — TELEPHONE (OUTPATIENT)
Dept: INTERNAL MEDICINE CLINIC | Age: 77
End: 2017-11-16

## 2017-11-16 NOTE — TELEPHONE ENCOUNTER
Patient notified by phone and ask how was her diabetes. She states pretty good. Patient is not checking her sugars twice a day. We ask her to check fbs and ac dinner and call Ami Martin.

## 2017-12-21 ENCOUNTER — OFFICE VISIT (OUTPATIENT)
Dept: INTERNAL MEDICINE CLINIC | Age: 77
End: 2017-12-21

## 2017-12-21 VITALS
WEIGHT: 166.4 LBS | BODY MASS INDEX: 29.48 KG/M2 | HEIGHT: 63 IN | HEART RATE: 65 BPM | SYSTOLIC BLOOD PRESSURE: 143 MMHG | DIASTOLIC BLOOD PRESSURE: 66 MMHG | OXYGEN SATURATION: 96 % | TEMPERATURE: 98.6 F | RESPIRATION RATE: 16 BRPM

## 2017-12-21 DIAGNOSIS — E78.5 DYSLIPIDEMIA: ICD-10-CM

## 2017-12-21 DIAGNOSIS — I10 ESSENTIAL HYPERTENSION: ICD-10-CM

## 2017-12-21 DIAGNOSIS — E11.9 TYPE 2 DIABETES MELLITUS WITHOUT COMPLICATION, WITH LONG-TERM CURRENT USE OF INSULIN (HCC): Primary | ICD-10-CM

## 2017-12-21 DIAGNOSIS — E03.2 HYPOTHYROIDISM DUE TO MEDICATION: ICD-10-CM

## 2017-12-21 DIAGNOSIS — Z79.4 TYPE 2 DIABETES MELLITUS WITHOUT COMPLICATION, WITH LONG-TERM CURRENT USE OF INSULIN (HCC): Primary | ICD-10-CM

## 2017-12-21 NOTE — PROGRESS NOTES
Chief Complaint   Patient presents with    Diabetes     1 month follow up      1. Have you been to the ER, urgent care clinic since your last visit? Hospitalized since your last visit? No    2. Have you seen or consulted any other health care providers outside of the [address] [place] [address] since your last visit? Include any pap smears or colon screening.  No

## 2017-12-21 NOTE — MR AVS SNAPSHOT
Visit Information Date & Time Provider Department Dept. Phone Encounter #  
 12/21/2017  4:45 PM Osmar Salas MD [facility] [phone] [id number] Upcoming Health Maintenance Date Due  
 FOOT EXAM Q1 5/8/2016 MICROALBUMIN Q1 7/14/2017 LIPID PANEL Q1 7/14/2017 EYE EXAM RETINAL OR DILATED Q1 1/21/2018* HEMOGLOBIN A1C Q6M 4/12/2018 MEDICARE YEARLY EXAM 10/13/2018 GLAUCOMA SCREENING Q2Y 12/21/2019 DTaP/Tdap/Td series (2 - Td) 2/2/2027 *Topic was postponed. The date shown is not the original due date. Allergies as of 12/21/2017  Review Complete On: 12/21/2017 By: John Martínez No Known Allergies Current Immunizations  Reviewed on 11/15/2012 Name Date Influenza Vaccine Split  Deferred (Patient Refused) ZZZ-RETIRED (DO NOT USE) Pneumococcal Vaccine (Unspecified Type) 11/11/2012 12:57 PM  
  
 Not reviewed this visit Vitals BP Pulse Temp Resp Height(growth percentile) Weight(growth percentile) 143/66 (BP 1 Location: Right arm, BP Patient Position: Sitting) 65 98.6 °F (37 °C) (Oral) 16 5' 3\" (1.6 m) 166 lb 6.4 oz (75.5 kg) SpO2 BMI OB Status Smoking Status 96% 29.48 kg/m2 Hysterectomy Never Smoker BMI and BSA Data Body Mass Index Body Surface Area  
 29.48 kg/m 2 1.83 m 2 Preferred Pharmacy Pharmacy Name Phone [id number] Sheeba Cargo, [address] [place] [phone] Your Updated Medication List  
  
   
This list is accurate as of: 12/21/17  5:29 PM.  Always use your most recent med list. amLODIPine 10 mg tablet Commonly known as:  Love Breach TAKE 1 TABLET BY MOUTH EVERY MORNING  
  
 * BD INSULIN PEN NEEDLE UF SHORT 31 gauge x 5/16\" Ndle Generic drug:  Insulin Needles (Disposable) USE AS DIRECTED TWICE A DAY * BD INSULIN PEN NEEDLE UF SHORT 31 gauge x 5/16\" Ndle Generic drug:  Insulin Needles (Disposable) USE AS DIRECTED TWICE A DAY  
  
 brimonidine 0.15 % ophthalmic solution Commonly known as:  Ricky Vincent Administer 1 Drop to both eyes two (2) times a day. clopidogrel 75 mg Tab Commonly known as:  PLAVIX Take 1 Tab by mouth daily. fenofibrate nanocrystallized 145 mg tablet Commonly known as:  TRICOR  
TAKE 1 TABLET BY MOUTH EVERY DAY  
  
 insulin aspart protamine/insulin aspart 100 unit/mL (70-30) injection Commonly known as:  NovoLOG Mix 70-30  
36 Units by SubCUTAneous route Daily (before breakfast). insulin degludec 100 unit/mL (3 mL) Inpn Commonly known as:  TRESIBA FLEXTOUCH U-100  
10 units every morning  
  
 latanoprost 0.005 % ophthalmic solution Commonly known as:  Julieta Sanon Administer 1 Drop to both eyes nightly. levothyroxine 150 mcg tablet Commonly known as:  SYNTHROID  
TAKE 1 TABLET BY MOUTH EVERY DAY  
  
 losartan-hydroCHLOROthiazide 100-25 mg per tablet Commonly known as:  HYZAAR  
TAKE 1 TABLET BY MOUTH DAILY potassium chloride SR 20 mEq tablet Commonly known as:  K-TAB Take 2 Tabs by mouth daily. pravastatin 40 mg tablet Commonly known as:  PRAVACHOL Take 1 Tab by mouth nightly. * Notice: This list has 2 medication(s) that are the same as other medications prescribed for you. Read the directions carefully, and ask your doctor or other care provider to review them with you. Introducing [facility]! Bonita Nguyen introduces [employer] patient portal. Now you can access parts of your medical record, email your doctor's office, and request medication refills online. 1. In your internet browser, go to [email or web address] 2. Click on the First Time User? Click Here link in the Sign In box. You will see the New Member Sign Up page. 3. Enter your [employer] Access Code exactly as it appears below.  You will not need to use this code after youve completed the sign-up process. If you do not sign up before the expiration date, you must request a new code. · [employer]_ Access Code: [email or web address] Expires: 2/17/2018  3:22 PM 
 
4. Enter the last four digits of your Social Security Number (xxxx) and Date of Birth (mm/dd/yyyy) as indicated and click Submit. You will be taken to the next sign-up page. 5. Create a [employer]_ ID. This will be your [employer] login ID and cannot be changed, so think of one that is secure and easy to remember. 6. Create a [employer]_ password. You can change your password at any time. 7. Enter your Password Reset Question and Answer. This can be used at a later time if you forget your password. 8. Enter your e-mail address. You will receive e-mail notification when new information is available in [address] 9. Click Sign Up. You can now view and download portions of your medical record. 10. Click the Download Summary menu link to download a portable copy of your medical information. If you have questions, please visit the Frequently Asked Questions section of the [employer] website. Remember, [employer] is NOT to be used for urgent needs. For medical emergencies, dial 911. Now available from your iPhone and Android! Please provide this summary of care documentation to your next provider. Your primary care clinician is listed as William Doll. If you have any questions after today's visit, please call [phone].

## 2017-12-23 LAB — FRUCTOSAMINE SERPL-SCNC: 604 UMOL/L (ref 0–285)

## 2017-12-25 ENCOUNTER — HOSPITAL ENCOUNTER (EMERGENCY)
Age: 77
Discharge: HOME | End: 2017-12-25
Attending: EMERGENCY MEDICINE
Payer: MEDICARE

## 2017-12-25 VITALS
WEIGHT: 166.45 LBS | DIASTOLIC BLOOD PRESSURE: 50 MMHG | RESPIRATION RATE: 18 BRPM | BODY MASS INDEX: 29.49 KG/M2 | HEIGHT: 63 IN | SYSTOLIC BLOOD PRESSURE: 135 MMHG | OXYGEN SATURATION: 98 % | HEART RATE: 91 BPM

## 2017-12-25 DIAGNOSIS — E16.2 HYPOGLYCEMIA: Primary | ICD-10-CM

## 2017-12-25 LAB
ALBUMIN SERPL-MCNC: 2.8 G/DL (ref 3.5–5)
ALBUMIN/GLOB SERPL: 1 {RATIO} (ref 1.1–2.2)
ALP SERPL-CCNC: 52 U/L (ref 45–117)
ALT SERPL-CCNC: 20 U/L (ref 12–78)
ANION GAP SERPL CALC-SCNC: 7 MMOL/L (ref 5–15)
APPEARANCE UR: CLEAR
AST SERPL-CCNC: 23 U/L (ref 15–37)
BACTERIA URNS QL MICRO: NEGATIVE /HPF
BASOPHILS # BLD: 0 K/UL (ref 0–0.1)
BASOPHILS NFR BLD: 0 % (ref 0–1)
BILIRUB SERPL-MCNC: 0.3 MG/DL (ref 0.2–1)
BILIRUB UR QL: NEGATIVE
BUN SERPL-MCNC: 20 MG/DL (ref 6–20)
BUN/CREAT SERPL: 29 (ref 12–20)
CALCIUM SERPL-MCNC: 7.1 MG/DL (ref 8.5–10.1)
CHLORIDE SERPL-SCNC: 110 MMOL/L (ref 97–108)
CO2 SERPL-SCNC: 24 MMOL/L (ref 21–32)
COLOR UR: ABNORMAL
CREAT SERPL-MCNC: 0.68 MG/DL (ref 0.55–1.02)
EOSINOPHIL # BLD: 0.1 K/UL (ref 0–0.4)
EOSINOPHIL NFR BLD: 2 % (ref 0–7)
EPITH CASTS URNS QL MICRO: ABNORMAL /LPF
ERYTHROCYTE [DISTWIDTH] IN BLOOD BY AUTOMATED COUNT: 13 % (ref 11.5–14.5)
GLOBULIN SER CALC-MCNC: 2.8 G/DL (ref 2–4)
GLUCOSE BLD STRIP.AUTO-MCNC: 240 MG/DL (ref 65–100)
GLUCOSE BLD STRIP.AUTO-MCNC: 93 MG/DL (ref 65–100)
GLUCOSE SERPL-MCNC: 151 MG/DL (ref 65–100)
GLUCOSE UR STRIP.AUTO-MCNC: NEGATIVE MG/DL
HCT VFR BLD AUTO: 36.8 % (ref 35–47)
HGB BLD-MCNC: 12.6 G/DL (ref 11.5–16)
HGB UR QL STRIP: NEGATIVE
HYALINE CASTS URNS QL MICRO: ABNORMAL /LPF (ref 0–5)
KETONES UR QL STRIP.AUTO: ABNORMAL MG/DL
LEUKOCYTE ESTERASE UR QL STRIP.AUTO: NEGATIVE
LYMPHOCYTES # BLD: 1.1 K/UL (ref 0.8–3.5)
LYMPHOCYTES NFR BLD: 30 % (ref 12–49)
MCH RBC QN AUTO: 30.9 PG (ref 26–34)
MCHC RBC AUTO-ENTMCNC: 34.2 G/DL (ref 30–36.5)
MCV RBC AUTO: 90.2 FL (ref 80–99)
MONOCYTES # BLD: 0.4 K/UL (ref 0–1)
MONOCYTES NFR BLD: 10 % (ref 5–13)
NEUTS SEG # BLD: 2.2 K/UL (ref 1.8–8)
NEUTS SEG NFR BLD: 58 % (ref 32–75)
NITRITE UR QL STRIP.AUTO: NEGATIVE
PH UR STRIP: 6.5 [PH] (ref 5–8)
PLATELET # BLD AUTO: 243 K/UL (ref 150–400)
POTASSIUM SERPL-SCNC: 3.4 MMOL/L (ref 3.5–5.1)
PROT SERPL-MCNC: 5.6 G/DL (ref 6.4–8.2)
PROT UR STRIP-MCNC: NEGATIVE MG/DL
RBC # BLD AUTO: 4.08 M/UL (ref 3.8–5.2)
RBC #/AREA URNS HPF: ABNORMAL /HPF (ref 0–5)
SERVICE CMNT-IMP: ABNORMAL
SERVICE CMNT-IMP: NORMAL
SODIUM SERPL-SCNC: 141 MMOL/L (ref 136–145)
SP GR UR REFRACTOMETRY: 1.02 (ref 1–1.03)
UA: UC IF INDICATED,UAUC: ABNORMAL
UROBILINOGEN UR QL STRIP.AUTO: 0.2 EU/DL (ref 0.2–1)
WBC # BLD AUTO: 3.8 K/UL (ref 3.6–11)
WBC URNS QL MICRO: ABNORMAL /HPF (ref 0–4)

## 2017-12-25 PROCEDURE — 36415 COLL VENOUS BLD VENIPUNCTURE: CPT | Performed by: EMERGENCY MEDICINE

## 2017-12-25 PROCEDURE — 81001 URINALYSIS AUTO W/SCOPE: CPT | Performed by: EMERGENCY MEDICINE

## 2017-12-25 PROCEDURE — 82962 GLUCOSE BLOOD TEST: CPT

## 2017-12-25 PROCEDURE — 99285 EMERGENCY DEPT VISIT HI MDM: CPT

## 2017-12-25 PROCEDURE — 85025 COMPLETE CBC W/AUTO DIFF WBC: CPT | Performed by: EMERGENCY MEDICINE

## 2017-12-25 PROCEDURE — 80053 COMPREHEN METABOLIC PANEL: CPT | Performed by: EMERGENCY MEDICINE

## 2017-12-25 NOTE — DISCHARGE INSTRUCTIONS
- Please decrease your Paula Genera to 10 units in the morning.  - Please monitor your blood glucose three times per day to ensure you blood sugar is above 80.  - Please follow up with your primary care provider for further evaluation/management of diabetes and medications. Hypoglycemia: Care Instructions  Your Care Instructions    Hypoglycemia means that your blood sugar is low and your body is not getting enough fuel. Some people get low blood sugar from not eating often enough. Some medicines to treat diabetes can cause low blood sugar. People who have had surgery on their stomachs or intestines may get hypoglycemia. Problems with the pancreas, kidneys, or liver also can cause low blood sugar. A snack or drink with sugar in it will raise your blood sugar and should ease your symptoms right away. Your doctor may recommend that you change or stop your medicines until you can get your blood sugar levels under control. In the long run, you may need to change your diet and eating habits so that you get enough fuel for your body throughout the day. Follow-up care is a key part of your treatment and safety. Be sure to make and go to all appointments, and call your doctor if you are having problems. It's also a good idea to know your test results and keep a list of the medicines you take. How can you care for yourself at home? · Learn to recognize the early signs of low blood sugar. Signs include:  ¨ Nausea. ¨ Hunger. ¨ Feeling nervous, irritable, or shaky. ¨ Cold, clammy, wet skin. ¨ Sweating (when you are not exercising). ¨ A fast heartbeat. ¨ Numbness or tingling of the fingertips or lips. · If you feel an episode of low blood sugar coming on, drink fruit juice or sugared (not diet) soda, or eat sugar in the form of candy, cubes, or tablets. [employer] are another [employer]. · Eat small, frequent meals so that you do not get too hungry between meals. · Balance extra exercise with eating more.   · Keep a written record of your low blood sugar episodes, including when you last ate and what you ate, so that you can learn what causes your blood sugar to drop. · Make sure your family, friends, and coworkers know the symptoms of low blood sugar and know what to do to get your sugar level up. · Wear medical alert jewelry that lists your condition. You can buy this at most drugstores. When should you call for help? Call 911 anytime you think you may need emergency care. For example, call if:  ? · You passed out (lost consciousness). ? · You are confused or cannot think clearly. ? · Your blood sugar is very high or very low. ? Watch closely for changes in your health, and be sure to contact your doctor if:  ? · Your blood sugar stays outside the level your doctor set for you. ? · You have any problems. Where can you learn more? Go to http://toi-sunil.info/. Enter S171 in the search box to learn more about \"Hypoglycemia: Care Instructions. \"  Current as of: March 13, 2017  Content Version: 11.4  © 4687-3977 Healthwise, Incorporated. Care instructions adapted under license by [employer] (which disclaims liability or warranty for this information). If you have questions about a medical condition or this instruction, always ask your healthcare professional. [address] any warranty or liability for your use of this information.

## 2017-12-25 NOTE — ED PROVIDER NOTES
EMERGENCY DEPARTMENT HISTORY AND PHYSICAL EXAM      Date: 12/25/2017  Patient Name: Chetan Rivera    History of Presenting Illness     Chief Complaint   Patient presents with    Low Blood Sugar       History Provided By: Patient and EMS    HPI: Chetan Rivera, 77 y.o. female with PMHx significant for DM and HTN, presents via EMS to the ED for evaluation of low blood sugar. EMS states pt son was unable to wake pt up this morning and notes she was unable to drink her glucose drink so he called EMS. EMS reports blood sugar was 26 and notes pt was given glucagon en route to ED. Pt states her blood sugar has been in the 70s for the past few days. She notes she does not remember if she ate dinner last night. Pt states she takes [place] 20 units daily as prescribed. Pt does not have any complaints so location, quality, duration, and severity were not reported. Pt denies any NVD, ABD pain, CP, dyspnea, urinary complaints, cough, or SOB. PCP: Tere Howell MD    There are no other complaints, changes, or physical findings at this time. Current Outpatient Prescriptions   Medication Sig Dispense Refill    insulin degludec (TRESIBA FLEXTOUCH U-100) 100 unit/mL (3 mL) inpn 10 units every morning 2 Pen 0    potassium chloride SR (K-TAB) 20 mEq tablet Take 2 Tabs by mouth daily. 20 Tab 0    BD INSULIN PEN NEEDLE UF SHORT 31 gauge x 5/16\" ndle USE AS DIRECTED TWICE A DAY 60 Pen Needle 11    pravastatin (PRAVACHOL) 40 mg tablet Take 1 Tab by mouth nightly. 30 Tab 11    losartan-hydroCHLOROthiazide (HYZAAR) 100-25 mg per tablet TAKE 1 TABLET BY MOUTH DAILY 30 Tab 11    clopidogrel (PLAVIX) 75 mg tab Take 1 Tab by mouth daily.  30 Tab 11    fenofibrate nanocrystallized (TRICOR) 145 mg tablet TAKE 1 TABLET BY MOUTH EVERY DAY 30 Tab 11    BD INSULIN PEN NEEDLE UF SHORT 31 gauge x 5/16\" ndle USE AS DIRECTED TWICE A DAY  11    levothyroxine (SYNTHROID) 150 mcg tablet TAKE 1 TABLET BY MOUTH EVERY DAY 30 Tab 11    amLODIPine (NORVASC) 10 mg tablet TAKE 1 TABLET BY MOUTH EVERY MORNING 30 Tab 11    brimonidine (ALPHAGAN) 0.15 % ophthalmic solution Administer 1 Drop to both eyes two (2) times a day.  latanoprost (XALATAN) 0.005 % ophthalmic solution Administer 1 Drop to both eyes nightly. Past History     Past Medical History:  Past Medical History:   Diagnosis Date    [facility]     Heart failure ([facility]     unknown to family    Hypercholesteremia     Hypertension     Stroke ([facility]     Thyroid disease        Past Surgical History:  Past Surgical History:   Procedure Laterality Date    HX GYN      HX HEENT      thyroidectomy    HX HYSTERECTOMY      REMOVAL GALLBLADDER      THYROIDECTOMY         Family History:  Family History   Problem Relation Age of Onset    Diabetes Father     No Known Problems Mother        Social History:  Social History   Substance Use Topics    Smoking status: Never Smoker    Smokeless tobacco: Never Used    Alcohol use No      Comment: been years       Allergies:  No Known Allergies      Review of Systems   Review of Systems   Constitutional: Negative for chills, fatigue and fever. HENT: Negative for congestion, rhinorrhea and sore throat. Eyes: Negative for pain, discharge and visual disturbance. Respiratory: Negative for cough, chest tightness, shortness of breath and wheezing. Cardiovascular: Negative for chest pain, palpitations and leg swelling. Gastrointestinal: Negative for abdominal pain, constipation, diarrhea, nausea and vomiting. Endocrine:        (+) low blood sugar   Genitourinary: Negative for dysuria, frequency and hematuria. Musculoskeletal: Negative for arthralgias, back pain and myalgias. Skin: Negative for rash. Neurological: Negative for dizziness, weakness, light-headedness and headaches. Psychiatric/Behavioral: Negative. Physical Exam   Physical Exam   Constitutional: She is oriented to person, place, and time.  She appears well-developed and well-nourished. No distress. HENT:   Head: Normocephalic and atraumatic. Eyes: EOM are normal. Right eye exhibits no discharge. Left eye exhibits no discharge. No scleral icterus. Neck: Normal range of motion. Neck supple. No tracheal deviation present. Cardiovascular: Normal rate, regular rhythm, normal heart sounds and intact distal pulses. Exam reveals no gallop and no friction rub. No murmur heard. Pulmonary/Chest: Effort normal and breath sounds normal. No respiratory distress. She has no wheezes. She has no rales. Abdominal: Soft. She exhibits no distension. There is no tenderness. Musculoskeletal: Normal range of motion. She exhibits no edema. Lymphadenopathy:     She has no cervical adenopathy. Neurological: She is alert and oriented to person, place, and time. No focal neuro deficits   Skin: Skin is warm and dry. No rash noted. Psychiatric: She has a normal mood and affect. Nursing note and vitals reviewed. Diagnostic Study Results     Labs -     Recent Results (from the past 12 hour(s))   GLUCOSE, POC    Collection Time: 12/25/17  8:43 AM   Result Value Ref Range    Glucose (POC) 93 65 - 100 mg/dL    Performed by [facility]    CBC WITH AUTOMATED DIFF    Collection Time: 12/25/17  8:57 AM   Result Value Ref Range    WBC 3.8 3.6 - 11.0 K/uL    RBC 4.08 3.80 - 5.20 M/uL    HGB 12.6 11.5 - 16.0 g/dL    HCT 36.8 35.0 - 47.0 %    MCV 90.2 80.0 - 99.0 FL    MCH 30.9 26.0 - 34.0 PG    MCHC 34.2 30.0 - 36.5 g/dL    RDW 13.0 11.5 - 14.5 %    PLATELET 448 759 - 733 K/uL    NEUTROPHILS 58 32 - 75 %    LYMPHOCYTES 30 12 - 49 %    MONOCYTES 10 5 - 13 %    EOSINOPHILS 2 0 - 7 %    BASOPHILS 0 0 - 1 %    ABS. NEUTROPHILS 2.2 1.8 - 8.0 K/UL    ABS. LYMPHOCYTES 1.1 0.8 - 3.5 K/UL    ABS. MONOCYTES 0.4 0.0 - 1.0 K/UL    ABS. EOSINOPHILS 0.1 0.0 - 0.4 K/UL    ABS.  BASOPHILS 0.0 0.0 - 0.1 K/UL   URINALYSIS W/ REFLEX CULTURE    Collection Time: 12/25/17  8:57 AM   Result Value Ref Range    Color YELLOW/STRAW      Appearance CLEAR CLEAR      Specific gravity 1.017 1.003 - 1.030      pH (UA) 6.5 5.0 - 8.0      Protein NEGATIVE  NEG mg/dL    Glucose NEGATIVE  NEG mg/dL    Ketone TRACE (A) NEG mg/dL    Bilirubin NEGATIVE  NEG      Blood NEGATIVE  NEG      Urobilinogen 0.2 0.2 - 1.0 EU/dL    Nitrites NEGATIVE  NEG      Leukocyte Esterase NEGATIVE  NEG      WBC 0-4 0 - 4 /hpf    RBC 0-5 0 - 5 /hpf    Epithelial cells FEW FEW /lpf    Bacteria NEGATIVE  NEG /hpf    UA:UC IF INDICATED CULTURE NOT INDICATED BY UA RESULT CNI      Hyaline cast 0-2 0 - 5 /lpf   METABOLIC PANEL, COMPREHENSIVE    Collection Time: 12/25/17  9:37 AM   Result Value Ref Range    Sodium 141 136 - 145 mmol/L    Potassium 3.4 (L) 3.5 - 5.1 mmol/L    Chloride 110 (H) 97 - 108 mmol/L    CO2 24 21 - 32 mmol/L    Anion gap 7 5 - 15 mmol/L    Glucose 151 (H) 65 - 100 mg/dL    BUN 20 6 - 20 MG/DL    Creatinine 0.68 0.55 - 1.02 MG/DL    BUN/Creatinine ratio 29 (H) 12 - 20      GFR est AA >60 >60 ml/min/1.73m2    GFR est non-AA >60 >60 ml/min/1.73m2    Calcium 7.1 (L) 8.5 - 10.1 MG/DL    Bilirubin, total 0.3 0.2 - 1.0 MG/DL    ALT (SGPT) 20 12 - 78 U/L    AST (SGOT) 23 15 - 37 U/L    Alk. phosphatase 52 45 - 117 U/L    Protein, total 5.6 (L) 6.4 - 8.2 g/dL    Albumin 2.8 (L) 3.5 - 5.0 g/dL    Globulin 2.8 2.0 - 4.0 g/dL    A-G Ratio 1.0 (L) 1.1 - 2.2     GLUCOSE, POC    Collection Time: 12/25/17 10:09 AM   Result Value Ref Range    Glucose (POC) 240 (H) 65 - 100 mg/dL    Performed by [facility]   I am the first provider for this patient. I reviewed the vital signs, available nursing notes, past medical history, past surgical history, family history and social history. Vital Signs-Reviewed the patient's vital signs.   Patient Vitals for the past 12 hrs:   Pulse Resp BP SpO2   12/25/17 1015 - - 136/42 100 %   12/25/17 1000 - - 145/42 100 %   12/25/17 0843 91 18 (!) 141/98 100 %   12/25/17 0843 - - - 100 %   12/25/17 0841 - - (!) 141/98 -     Records Reviewed: Nursing Notes and Old Medical Records    Provider Notes (Medical Decision Making):   Patient presents to ED after hypoglycemic event. She is currently asymptomatic on exam, well appearing. Patient is currently on Tresiba 20 units in the morning for DM although chart review suggests she should be taking 10 units. Will check CBC, CMP, UA. Will have patient eat meal and monitor blood glucose closely. ED Course:   Initial assessment performed. The patients presenting problems have been discussed, and they are in agreement with the care plan formulated and outlined with them. I have encouraged them to ask questions as they arise throughout their visit. PROGRESS NOTE:  10:50 AM  Patient states she is feeling well and ready to be discharged. Blood glucose is stable after meal.  Given hypoglycemia this morning and BG in the 70s over the last several days, will decrease Tresiba to 10 units daily. Discussed change with patient and advised her to closely monitor BG. Advised follow up with PCP this week for reevaluation. Discussed results, prescriptions and follow up plan with patient. Provided customary return to ED instructions. Patient expressed understanding. Declan Valderrama MD    Disposition:  DISCHARGE NOTE  10:53 AM  The patient has been re-evaluated and is ready for discharge. Reviewed available results with patient. Counseled pt on diagnosis and care plan. Pt has expressed understanding, and all questions have been answered. Pt agrees with plan and agrees to follow up as recommended, or return to the ED if their symptoms worsen. Discharge instructions have been provided and explained to the pt, along with reasons to return to the ED. PLAN:  1. Current Discharge Medication List        2.    Follow-up Information     Follow up With Details Comments Mary Anne Rao MD In 2 days  [address] [place] [address] [address] [facility]  [phone]      [facility] EMERGENCY DEPT  As needed, If symptoms worsen [address] [place] [address]  [phone]        Return to ED if worse     Diagnosis     Clinical Impression:   1. Hypoglycemia      Attestations: This note is prepared by Srinath Cantrell, acting as Scribe for Andrea Og MD.    Andrea Og MD: The scribe's documentation has been prepared under my direction and personally reviewed by me in its entirety. I confirm that the note above accurately reflects all work, treatment, procedures, and medical decision making performed by me.

## 2017-12-25 NOTE — ED NOTES
Pt's son here to drive her home, pt alert skin warm dry amb to [address] [facility] with son

## 2017-12-25 NOTE — ED TRIAGE NOTES
Pt arrives from EMS with low blood sugar. Pt's son called EMS this morning when pt became unresponsive and was unable to drink glucose drink. EMS checked pt's blood sugar and it was 26 on the monitor. EMS administered 1 Glucagon to pt. Upon arrival, pt's blood sugar was 93 on the monitor. Pt has a hx of DM.

## 2018-01-02 ENCOUNTER — TELEPHONE (OUTPATIENT)
Dept: INTERNAL MEDICINE CLINIC | Age: 78
End: 2018-01-02

## 2018-01-02 NOTE — TELEPHONE ENCOUNTER
Per Dr. Katerin De Jesus ,we call patient and ask how many units tresiba 20 units. Per Dr. Cynthia Berry patient ask to call with BS readings nidhi 4 days.

## 2018-02-28 RX ORDER — AMLODIPINE BESYLATE 10 MG/1
TABLET ORAL
Qty: 30 TAB | Refills: 11 | Status: SHIPPED | OUTPATIENT
Start: 2018-02-28 | End: 2018-03-28 | Stop reason: SDUPTHER

## 2018-03-28 RX ORDER — AMLODIPINE BESYLATE 10 MG/1
TABLET ORAL
Qty: 90 TAB | Refills: 3 | Status: ON HOLD | OUTPATIENT
Start: 2018-03-28 | End: 2018-10-15 | Stop reason: SDUPTHER

## 2018-06-02 RX ORDER — PEN NEEDLE, DIABETIC 31 GX5/16"
NEEDLE, DISPOSABLE MISCELLANEOUS
Qty: 100 PACKAGE | Refills: 3 | Status: SHIPPED | OUTPATIENT
Start: 2018-06-02 | End: 2018-08-21

## 2018-07-10 ENCOUNTER — OFFICE VISIT (OUTPATIENT)
Dept: INTERNAL MEDICINE CLINIC | Age: 78
End: 2018-07-10

## 2018-07-10 VITALS
TEMPERATURE: 98.2 F | DIASTOLIC BLOOD PRESSURE: 63 MMHG | RESPIRATION RATE: 16 BRPM | OXYGEN SATURATION: 100 % | HEART RATE: 69 BPM | WEIGHT: 164.4 LBS | SYSTOLIC BLOOD PRESSURE: 160 MMHG | HEIGHT: 63 IN | BODY MASS INDEX: 29.13 KG/M2

## 2018-07-10 DIAGNOSIS — E03.2 HYPOTHYROIDISM DUE TO MEDICATION: ICD-10-CM

## 2018-07-10 DIAGNOSIS — E11.9 TYPE 2 DIABETES MELLITUS WITHOUT COMPLICATION, WITH LONG-TERM CURRENT USE OF INSULIN (HCC): Primary | ICD-10-CM

## 2018-07-10 DIAGNOSIS — R41.3 MEMORY DEFICIT: ICD-10-CM

## 2018-07-10 DIAGNOSIS — E78.5 DYSLIPIDEMIA: ICD-10-CM

## 2018-07-10 DIAGNOSIS — I10 ESSENTIAL HYPERTENSION: ICD-10-CM

## 2018-07-10 DIAGNOSIS — Z79.4 TYPE 2 DIABETES MELLITUS WITHOUT COMPLICATION, WITH LONG-TERM CURRENT USE OF INSULIN (HCC): Primary | ICD-10-CM

## 2018-07-10 NOTE — MR AVS SNAPSHOT
[address] [place] [address] [facility] [address] 
[phone] Patient: Martinez Horvath MRN: [id number] Visit Information Date & Time Provider Department Dept. Phone Encounter #  
 7/10/2018  4:15 PM Estevan Tilley [facility] and [address] [id number] Upcoming Health Maintenance Date Due  
 FOOT EXAM Q1 5/8/2016 EYE EXAM RETINAL OR DILATED Q1 5/8/2016 MICROALBUMIN Q1 7/14/2017 LIPID PANEL Q1 7/14/2017 HEMOGLOBIN A1C Q6M 4/12/2018 Influenza Age 5 to Adult 8/1/2018 MEDICARE YEARLY EXAM 10/13/2018 GLAUCOMA SCREENING Q2Y 12/21/2019 DTaP/Tdap/Td series (2 - Td) 2/2/2027 Allergies as of 7/10/2018  Review Complete On: 7/10/2018 By: Alen Bernal No Known Allergies Current Immunizations  Reviewed on 12/25/2017 Name Date Influenza Vaccine Split  Deferred (Patient Refused) ZZZ-RETIRED (DO NOT USE) Pneumococcal Vaccine (Unspecified Type) 11/11/2012 12:57 PM  
  
 Not reviewed this visit You Were Diagnosed With   
  
 Codes Comments Type 2 diabetes mellitus without complication, with long-term current use of insulin (HCC)    -  Primary ICD-10-CM: E11.9, Z79.4 ICD-9-CM: 250.00, V58.67 Dyslipidemia     ICD-10-CM: E78.5 ICD-9-CM: 272.4 Essential hypertension     ICD-10-CM: I10 
ICD-9-CM: 401.9 Hypothyroidism due to medication     ICD-10-CM: E03.2 ICD-9-CM: 244.8, E980.5 Memory deficit     ICD-10-CM: R41.3 ICD-9-CM: 780.93 Vitals BP Pulse Temp Resp Height(growth percentile) Weight(growth percentile) 160/63 (BP 1 Location: Right arm, BP Patient Position: Sitting) 69 98.2 °F (36.8 °C) (Oral) 16 5' 3\" (1.6 m) 164 lb 6.4 oz (74.6 kg) SpO2 BMI OB Status Smoking Status 100% 29.12 kg/m2 Hysterectomy Never Smoker Vitals History BMI and BSA Data  Body Mass Index Body Surface Area  
 29.12 kg/m 2 1.82 m 2  
  
  
 Preferred Pharmacy Pharmacy Name Phone [id number] [place], [address] [facility] [phone] Your Updated Medication List  
  
   
This list is accurate as of 7/10/18  5:25 PM.  Always use your most recent med list. amLODIPine 10 mg tablet Commonly known as:  Colton Denis TAKE 1 TABLET BY MOUTH EVERY MORNING  
  
 * BD ULTRA-FINE SHORT PEN NEEDLE 31 gauge x 5/16\" Ndle Generic drug:  Insulin Needles (Disposable) USE AS DIRECTED TWICE A DAY * BD ULTRA-FINE SHORT PEN NEEDLE 31 gauge x 5/16\" Ndle Generic drug:  Insulin Needles (Disposable) USE AS DIRECTED TWICE A DAY. dx.e11.9  
  
 brimonidine 0.15 % ophthalmic solution Commonly known as:  Lisandro Han Administer 1 Drop to both eyes two (2) times a day. clopidogrel 75 mg Tab Commonly known as:  PLAVIX TAKE 1 TAB BY MOUTH DAILY. fenofibrate nanocrystallized 145 mg tablet Commonly known as:  TRICOR  
TAKE 1 TABLET BY MOUTH EVERY DAY  
  
 insulin degludec 100 unit/mL (3 mL) Inpn Commonly known as:  TRESIBA FLEXTOUCH U-100  
10 units every morning  
  
 latanoprost 0.005 % ophthalmic solution Commonly known as:  Kenith Standing Administer 1 Drop to both eyes nightly. levothyroxine 150 mcg tablet Commonly known as:  SYNTHROID  
TAKE 1 TABLET BY MOUTH EVERY DAY  
  
 losartan-hydroCHLOROthiazide 100-25 mg per tablet Commonly known as:  HYZAAR  
TAKE 1 TABLET BY MOUTH DAILY potassium chloride SR 20 mEq tablet Commonly known as:  K-TAB Take 2 Tabs by mouth daily. pravastatin 40 mg tablet Commonly known as:  PRAVACHOL Take 1 Tab by mouth nightly. * Notice: This list has 2 medication(s) that are the same as other medications prescribed for you. Read the directions carefully, and ask your doctor or other care provider to review them with you. We Performed the Following APOLIPOPROTEIN B [id number] CPT(R)] HEMOGLOBIN A1C WITH EAG [12932 CPT(R)] METABOLIC PANEL, BASIC [99322 CPT(R)] MICROALBUMIN, UR, RAND W/ MICROALB/CREAT RATIO [id number] CPT(R)] TSH 3RD GENERATION [60593 CPT(R)] Introducing [facility]! Doris Allan introduces Emb[employer] patient portal. Now you can access parts of your medical record, email your doctor's office, and request medication refills online. 1. In your internet browser, go to [email or web address] 2. Click on the First Time User? Click Here link in the Sign In box. You will see the New Member Sign Up page. 3. Enter your Emb[employer] Access Code exactly as it appears below. You will not need to use this code after youve completed the sign-up process. If you do not sign up before the expiration date, you must request a new code. · Embanet Access Code: [email or web address] Expires: 10/8/2018  5:25 PM 
 
4. Enter the last four digits of your Social Security Number (xxxx) and Date of Birth (mm/dd/yyyy) as indicated and click Submit. You will be taken to the next sign-up page. 5. Create a Embanet ID. This will be your Embanet login ID and cannot be changed, so think of one that is secure and easy to remember. 6. Create a Embanet password. You can change your password at any time. 7. Enter your Password Reset Question and Answer. This can be used at a later time if you forget your password. 8. Enter your e-mail address. You will receive e-mail notification when new information is available in [address] 9. Click Sign Up. You can now view and download portions of your medical record. 10. Click the Download Summary menu link to download a portable copy of your medical information. If you have questions, please visit the Frequently Asked Questions section of the [employer] website. Remember, Emb[employer]et is NOT to be used for urgent needs. For medical emergencies, dial 911. Now available from your iPhone and Android! Please provide this summary of care documentation to your next provider. Your primary care clinician is listed as KrystenRayne Doll. If you have any questions after today's visit, please call [phone].

## 2018-07-10 NOTE — PROGRESS NOTES
Chief Complaint   Patient presents with    Diabetes     1. Have you been to the ER, urgent care clinic since your last visit? Hospitalized since your last visit? No    2. Have you seen or consulted any other health care providers outside of the [address] [place] [address] since your last visit? Include any pap smears or colon screening.  No

## 2018-07-12 LAB
ALBUMIN/CREAT UR: 3.1 MG/G CREAT (ref 0–30)
APO B SERPL-MCNC: 79 MG/DL (ref 54–133)
BUN SERPL-MCNC: 20 MG/DL (ref 8–27)
BUN/CREAT SERPL: 19 (ref 12–28)
CALCIUM SERPL-MCNC: 9 MG/DL (ref 8.7–10.3)
CHLORIDE SERPL-SCNC: 97 MMOL/L (ref 96–106)
CO2 SERPL-SCNC: 22 MMOL/L (ref 20–29)
CREAT SERPL-MCNC: 1.06 MG/DL (ref 0.57–1)
CREAT UR-MCNC: 110.7 MG/DL
EST. AVERAGE GLUCOSE BLD GHB EST-MCNC: 338 MG/DL
GLUCOSE SERPL-MCNC: 265 MG/DL (ref 65–99)
HBA1C MFR BLD: 13.4 % (ref 4.8–5.6)
MICROALBUMIN UR-MCNC: 3.4 UG/ML
POTASSIUM SERPL-SCNC: 4.6 MMOL/L (ref 3.5–5.2)
SODIUM SERPL-SCNC: 135 MMOL/L (ref 134–144)
TSH SERPL DL<=0.005 MIU/L-ACNC: 13.28 UIU/ML (ref 0.45–4.5)

## 2018-07-13 ENCOUNTER — TELEPHONE (OUTPATIENT)
Dept: INTERNAL MEDICINE CLINIC | Age: 78
End: 2018-07-13

## 2018-07-13 NOTE — TELEPHONE ENCOUNTER
Patient ask to increase her insulin by 10 units making her dose 30units per day. patient ask to eat at same time and decrease her intake of white food products.

## 2018-07-15 NOTE — PROGRESS NOTES
[address] [facility]  [place] [address]  [address] [place] [address]  Phone:  [phone]  Fax: [phone]       Chief Complaint   Patient presents with    Diabetes   . SUBJECTIVE:    Martinez Horvath is a 69 y.o. female comes in for return visit stating that she has done well. She is quite excited because she has not had any insulin reactions. This bothers me to an extent because it suggests the possibility that she may be totally out of control also. I suggested that she take a GLP-1 agonist in addition to a long-acting basal insulin which is [place] which she has declined. She is only concerned about not having a reaction. Given her age, this is very important but there has be some semblance of reasonable control with an ideal goal of a hemoglobin A1c for her being in the 8's. I also suspect her short term memory continues to get worse. For instance, as it is occurring today is preventing me from adequately assessing this. She is able to do her activities of daily living without any problems allegedly. She has a past history of primary hypertension, dyslipidemia and hypothyroidism. She admits to medication compliance otherwise. Current Outpatient Prescriptions   Medication Sig Dispense Refill    BD ULTRA-FINE SHORT PEN NEEDLE 31 gauge x 5/16\" ndle USE AS DIRECTED TWICE A DAY. dx.e11.9 100 Package 3    fenofibrate nanocrystallized (TRICOR) 145 mg tablet TAKE 1 TABLET BY MOUTH EVERY DAY 30 Tab 11    levothyroxine (SYNTHROID) 150 mcg tablet TAKE 1 TABLET BY MOUTH EVERY DAY 30 Tab 11    clopidogrel (PLAVIX) 75 mg tab TAKE 1 TAB BY MOUTH DAILY. 30 Tab 11    amLODIPine (NORVASC) 10 mg tablet TAKE 1 TABLET BY MOUTH EVERY MORNING 90 Tab 3    insulin degludec (TRESIBA FLEXTOUCH U-100) 100 unit/mL (3 mL) inpn 10 units every morning 2 Pen 0    potassium chloride SR (K-TAB) 20 mEq tablet Take 2 Tabs by mouth daily.  21 Tab 0    BD INSULIN PEN NEEDLE UF SHORT 31 gauge x 5/16\" ndle USE AS DIRECTED TWICE A DAY 60 Pen Needle 11    pravastatin (PRAVACHOL) 40 mg tablet Take 1 Tab by mouth nightly. 30 Tab 11    losartan-hydroCHLOROthiazide (HYZAAR) 100-25 mg per tablet TAKE 1 TABLET BY MOUTH DAILY 30 Tab 11    brimonidine (ALPHAGAN) 0.15 % ophthalmic solution Administer 1 Drop to both eyes two (2) times a day.  latanoprost (XALATAN) 0.005 % ophthalmic solution Administer 1 Drop to both eyes nightly.          Past Medical History:   Diagnosis Date    [facility]     Heart failure ([facility]     unknown to family    Hypercholesteremia     Hypertension     [facility]     Thyroid disease      Past Surgical History:   Procedure Laterality Date    HX GYN      HX HEENT      thyroidectomy    HX HYSTERECTOMY      REMOVAL GALLBLADDER      THYROIDECTOMY       No Known Allergies      REVIEW OF SYSTEMS:  General: negative for - chills or fever  ENT: negative for - headaches, nasal congestion or tinnitus  Respiratory: negative for - cough, hemoptysis, shortness of breath or wheezing  Cardiovascular : negative for - chest pain, edema, palpitations or shortness of breath  Gastrointestinal: negative for - abdominal pain, blood in stools, heartburn or nausea/vomiting  Genito-Urinary: no dysuria, trouble voiding, or hematuria  Musculoskeletal: negative for - gait disturbance, joint pain, joint stiffness or joint swelling  Neurological: no TIA or stroke symptoms  Hematologic: no bruises, no bleeding, no swollen glands  Integument: no lumps, mole changes, nail changes or rash  Endocrine: no malaise/lethargy or unexpected weight changes      Social History     Social History    Marital status:      Spouse name: N/A    Number of children: 1    Years of education: N/A     Occupational History    retired      Social History Main Topics    Smoking status: Never Smoker    Smokeless tobacco: Never Used    Alcohol use No      Comment: been years    Drug use: No    Sexual activity: Not Currently     Other Topics Concern    None     Social History Narrative     Family History   Problem Relation Age of Onset    Diabetes Father     No Known Problems Mother        OBJECTIVE:    Visit Vitals    /63 (BP 1 Location: Right arm, BP Patient Position: Sitting)    Pulse 69    Temp 98.2 °F (36.8 °C) (Oral)    Resp 16    Ht 5' 3\" (1.6 m)    Wt 164 lb 6.4 oz (74.6 kg)    SpO2 100%    BMI 29.12 kg/m2     CONSTITUTIONAL: well , well nourished, appears age appropriate  EYES: perrla, eom intact  ENMT:moist mucous membranes, pharynx clear  NECK: supple. Thyroid normal  RESPIRATORY: Chest: clear to ascultation and percussion   CARDIOVASCULAR: Heart: regular rate and rhythm  GASTROINTESTINAL: Abdomen: soft, bowel sounds active  HEMATOLOGIC: no pathological lymph nodes palpated  MUSCULOSKELETAL: Extremities: no edema, pulse 1+   INTEGUMENT: No unusual rashes or suspicious skin lesions noted. Nails appear normal.  NEUROLOGIC: non-focal exam   MENTAL STATUS: alert and oriented, appropriate affect      ASSESSMENT:  1. Type 2 diabetes mellitus without complication, with long-term current use of insulin ([address]    2. Dyslipidemia    3. Essential hypertension    4. Hypothyroidism due to medication    5. Memory deficit        PLAN:    1. I have no idea how her diabetes is doing. I will await the results of the hemoglobin A1c. The fact that she doesn't have a reaction is good but unfortunately it also suggests that she may be totally out of control. She does not check sugars so she has no idea. 2. She will continue statin as prescribed. 3. Blood pressure is adequate today. 4. She does have a history of hypothyroidism and I will assess her TSH for efficacy and compliance. 5. I also suspect that her short term memory is getting worse but no one available for me to discuss this with. She is quite appropriate in the office.   Questions directed at eliciting information in the past are not answered. .  Orders Placed This Encounter    APOLIPOPROTEIN B    HEMOGLOBIN A1C WITH EAG    METABOLIC PANEL, BASIC    MICROALBUMIN, UR, RAND W/ MICROALB/CREAT RATIO    TSH 3RD GENERATION         Follow-up Disposition:  Return in about 6 weeks (around 8/21/2018).       Markel Hu MD

## 2018-08-09 ENCOUNTER — HOSPITAL ENCOUNTER (EMERGENCY)
Age: 78
Discharge: HOME | End: 2018-08-09
Attending: EMERGENCY MEDICINE
Payer: MEDICARE

## 2018-08-09 VITALS
TEMPERATURE: 97.5 F | OXYGEN SATURATION: 99 % | SYSTOLIC BLOOD PRESSURE: 182 MMHG | HEIGHT: 63 IN | RESPIRATION RATE: 18 BRPM | BODY MASS INDEX: 29.14 KG/M2 | DIASTOLIC BLOOD PRESSURE: 66 MMHG | WEIGHT: 164.46 LBS | HEART RATE: 69 BPM

## 2018-08-09 DIAGNOSIS — E16.2 HYPOGLYCEMIA: Primary | ICD-10-CM

## 2018-08-09 LAB
ALBUMIN SERPL-MCNC: 3.1 G/DL (ref 3.5–5)
ALBUMIN/GLOB SERPL: 1 {RATIO} (ref 1.1–2.2)
ALP SERPL-CCNC: 63 U/L (ref 45–117)
ALT SERPL-CCNC: 22 U/L (ref 12–78)
ANION GAP SERPL CALC-SCNC: 9 MMOL/L (ref 5–15)
AST SERPL-CCNC: 20 U/L (ref 15–37)
BASOPHILS # BLD: 0 K/UL (ref 0–0.1)
BASOPHILS NFR BLD: 0 % (ref 0–1)
BILIRUB SERPL-MCNC: 0.3 MG/DL (ref 0.2–1)
BUN SERPL-MCNC: 12 MG/DL (ref 6–20)
BUN/CREAT SERPL: 17 (ref 12–20)
CALCIUM SERPL-MCNC: 7.9 MG/DL (ref 8.5–10.1)
CHLORIDE SERPL-SCNC: 107 MMOL/L (ref 97–108)
CO2 SERPL-SCNC: 25 MMOL/L (ref 21–32)
CREAT SERPL-MCNC: 0.7 MG/DL (ref 0.55–1.02)
DIFFERENTIAL METHOD BLD: ABNORMAL
EOSINOPHIL # BLD: 0.1 K/UL (ref 0–0.4)
EOSINOPHIL NFR BLD: 3 % (ref 0–7)
ERYTHROCYTE [DISTWIDTH] IN BLOOD BY AUTOMATED COUNT: 13.2 % (ref 11.5–14.5)
GLOBULIN SER CALC-MCNC: 3 G/DL (ref 2–4)
GLUCOSE BLD STRIP.AUTO-MCNC: 131 MG/DL (ref 65–100)
GLUCOSE BLD STRIP.AUTO-MCNC: 157 MG/DL (ref 65–100)
GLUCOSE SERPL-MCNC: 150 MG/DL (ref 65–100)
HCT VFR BLD AUTO: 35.3 % (ref 35–47)
HGB BLD-MCNC: 11.9 G/DL (ref 11.5–16)
IMM GRANULOCYTES # BLD: 0 K/UL (ref 0–0.04)
IMM GRANULOCYTES NFR BLD AUTO: 1 % (ref 0–0.5)
LYMPHOCYTES # BLD: 0.9 K/UL (ref 0.8–3.5)
LYMPHOCYTES NFR BLD: 21 % (ref 12–49)
MCH RBC QN AUTO: 31.7 PG (ref 26–34)
MCHC RBC AUTO-ENTMCNC: 33.7 G/DL (ref 30–36.5)
MCV RBC AUTO: 94.1 FL (ref 80–99)
MONOCYTES # BLD: 0.4 K/UL (ref 0–1)
MONOCYTES NFR BLD: 10 % (ref 5–13)
NEUTS SEG # BLD: 2.8 K/UL (ref 1.8–8)
NEUTS SEG NFR BLD: 66 % (ref 32–75)
NRBC # BLD: 0 K/UL (ref 0–0.01)
NRBC BLD-RTO: 0 PER 100 WBC
PLATELET # BLD AUTO: 288 K/UL (ref 150–400)
PMV BLD AUTO: 10.2 FL (ref 8.9–12.9)
POTASSIUM SERPL-SCNC: 3.5 MMOL/L (ref 3.5–5.1)
PROT SERPL-MCNC: 6.1 G/DL (ref 6.4–8.2)
RBC # BLD AUTO: 3.75 M/UL (ref 3.8–5.2)
SERVICE CMNT-IMP: ABNORMAL
SERVICE CMNT-IMP: ABNORMAL
SODIUM SERPL-SCNC: 141 MMOL/L (ref 136–145)
WBC # BLD AUTO: 4.2 K/UL (ref 3.6–11)

## 2018-08-09 PROCEDURE — 85025 COMPLETE CBC W/AUTO DIFF WBC: CPT | Performed by: EMERGENCY MEDICINE

## 2018-08-09 PROCEDURE — 74011250637 HC RX REV CODE- 250/637: Performed by: EMERGENCY MEDICINE

## 2018-08-09 PROCEDURE — 99284 EMERGENCY DEPT VISIT MOD MDM: CPT

## 2018-08-09 PROCEDURE — 80053 COMPREHEN METABOLIC PANEL: CPT

## 2018-08-09 PROCEDURE — 82962 GLUCOSE BLOOD TEST: CPT

## 2018-08-09 PROCEDURE — 36415 COLL VENOUS BLD VENIPUNCTURE: CPT | Performed by: EMERGENCY MEDICINE

## 2018-08-09 RX ORDER — ACETAMINOPHEN 500 MG
1000 TABLET ORAL ONCE
Status: COMPLETED | OUTPATIENT
Start: 2018-08-09 | End: 2018-08-09

## 2018-08-09 RX ADMIN — ACETAMINOPHEN 1000 MG: 500 TABLET ORAL at 11:19

## 2018-08-09 NOTE — ED TRIAGE NOTES
Pt states has been having low blood sugars. States her PMD reduced her insulin needs from 30 units to 24 units. Per EMS, blood sugar 25 at home today and was 170 after getting 1 amp d50.

## 2018-08-09 NOTE — ED NOTES
Pt without upper dentures, so soft foods gathered for pt = jello, applesauce, orange ice. Along with diet ginger ale. Eating without complaints.

## 2018-08-09 NOTE — DISCHARGE INSTRUCTIONS
Learning About Low Blood Sugar (Hypoglycemia) in Diabetes  What is low blood sugar (hypoglycemia)? Hypoglycemia means that your blood sugar is low and your body (especially your brain) is not getting enough fuel. If you have diabetes, your blood sugar can go too low if you take too much of some diabetes medicines. It can also go too low if you miss a meal. And it can happen if you exercise too hard without eating enough food. Some medicines used to treat other health problems can cause low blood sugar too. What are the symptoms? Symptoms of low blood sugar can start quickly. It may take just 10 to 15 minutes. If you have had diabetes for many years, you may not realize that your blood sugar is low until it drops very low. · If your blood sugar level drops below 70 (mild low blood sugar), you may feel tired, anxious, dizzy, weak, shaky, or sweaty. You may have a fast heartbeat or blurry vision. · If your blood sugar level continues to drop (usually below 40), your behavior may change. You may feel more irritable. You may find it hard to concentrate or talk. And you may feel unsteady when you stand or walk. You may become too weak or confused to eat something with sugar to raise your blood sugar level. · If your blood sugar level drops very low (usually below 20), you may pass out (lose consciousness). Or you may have a seizure or stroke. If you have symptoms of severe low blood sugar, you need to get medical care right away. If you had a low blood sugar level during the night, you may wake up tired or with a headache. Or you may sweat so much during the night that your pajamas or sheets are damp when you wake up. How is low blood sugar treated? You can treat low blood sugar by eating or drinking something that has 15 grams of carbohydrate. These should be quick-sugar foods.  Check your blood sugar level again 15 minutes after having a quick-sugar food to make sure your level is getting back to your target range. Here are examples of quick-sugar foods that have 15 grams of carbohydrate:  · 3 to 4 glucose tablets  · 1 tube of glucose gel  · Hard candy (such as 3 Jolly Ranchers or 5 to 7 Life Savers)  · 1 tablespoon honey  · 2 tablespoons of raisins  · ½ cup to ¾ cup (4 to 6 ounces) of fruit juice or regular (not diet) soda  · 1 tablespoon of sugar  · 1 cup of fat-free milk  If you have problems with severe low blood sugar, someone else may have to give you a shot of glucagon. This is a hormone that raises blood sugar levels quickly. How can you prevent low blood sugar? You can take steps to prevent low blood sugar. · Follow your treatment plan. Take your insulin or other diabetes medicine exactly as your doctor prescribed it. Talk with your doctor if you're having low blood sugar often. Your medicine may need to be adjusted if it's causing your low blood sugar. · Check your blood sugar levels often. This helps you find early changes before an emergency happens. · Keep a quick-sugar food with you in case your blood sugar level drops low. · Eat small meals more often so that you don't get too hungry between meals. Don't skip meals. · Balance extra exercise with eating more. Check your blood sugar and learn how it changes after exercise. If your blood sugar stays at a normal level, you may not need to eat after you exercise. · Limit how much alcohol you drink. Alcohol can make low blood sugar go even lower. Don't drink alcohol if you have problems recognizing the early signs of low blood sugar. · Keep a diary of your symptoms. This helps you learn when changes in your body may signal low blood sugar. And keep track of how often you have low blood sugar, including when you last ate and what you ate. This will help you learn what causes your blood sugar to drop. · Learn about diabetes and low blood sugar.  Support groups or a diabetes education center can help you understand how medicines, diet, and exercise affect your blood sugar levels. Since low blood sugar levels can quickly become an emergency, be sure to wear medical alert jewelry, such as a medical alert bracelet. This is to let people know you have diabetes so they can get help for you. You can buy this at most drugstores. And make sure your family, friends, and coworkers know the symptoms of low blood sugar. Teach them what to do to get your sugar level up. Follow-up care is a key part of your treatment and safety. Be sure to make and go to all appointments, and call your doctor if you are having problems. It's also a good idea to know your test results and keep a list of the medicines you take. Where can you learn more? Go to http://toi-sunil.info/. Enter H507 in the search box to learn more about \"Learning About Low Blood Sugar (Hypoglycemia) in Diabetes. \"  Current as of: December 7, 2017  Content Version: 11.7  © 4915-3299 [employer]. Care instructions adapted under license by [employer] (which disclaims liability or warranty for this information). If you have questions about a medical condition or this instruction, always ask your healthcare professional. [address] any warranty or liability for your use of this information.

## 2018-08-09 NOTE — ED NOTES
Dc instructions per Dr. Jose Vega  All questions and concerns addressed  Waiting for son to return from cafeteria for ambulation of of ER to POV

## 2018-08-09 NOTE — ED PROVIDER NOTES
EMERGENCY DEPARTMENT HISTORY AND PHYSICAL EXAM      Date: 8/9/2018  Patient Name: Kisha Li    History of Presenting Illness     Chief Complaint   Patient presents with    Low Blood Sugar     per EMS, reported blood sugar of 25 at tome. gave d50 and up to 170       History Provided By: Patient and EMS    HPI: Kisha Li, 69 y.o. female with PMHx significant for DM, heart failure, thyroid disease, HTN, hypercholesterolemia, CVA, presents via EMS to the ED with cc of low blood sugar this morning with associated confusion. Pt states that she has last taken 25 units of her regular insulin yesterday morning, and notes that her PCP had lowered her insulin dose a week ago from 30 units. Per EMS, pt's blood sugar was 25 at home today and was 170 after getting 1 amp of D50. Pt states that she did not eat this morning. Chief Complaint: low blood sugar  Duration: 3 Hours  Timing:  Waxing and Waning  Location: n/a  Quality: n/a  Severity: N/A  Modifying Factors: alleviated with dextrose  Associated Symptoms: confusion    There are no other complaints, changes, or physical findings at this time. PCP: Glendy Lucero MD    Current Outpatient Prescriptions   Medication Sig Dispense Refill    BD ULTRA-FINE SHORT PEN NEEDLE 31 gauge x 5/16\" ndle USE AS DIRECTED TWICE A DAY. dx.e11.9 100 Package 3    fenofibrate nanocrystallized (TRICOR) 145 mg tablet TAKE 1 TABLET BY MOUTH EVERY DAY 30 Tab 11    levothyroxine (SYNTHROID) 150 mcg tablet TAKE 1 TABLET BY MOUTH EVERY DAY 30 Tab 11    clopidogrel (PLAVIX) 75 mg tab TAKE 1 TAB BY MOUTH DAILY. 30 Tab 11    amLODIPine (NORVASC) 10 mg tablet TAKE 1 TABLET BY MOUTH EVERY MORNING 90 Tab 3    insulin degludec (TRESIBA FLEXTOUCH U-100) 100 unit/mL (3 mL) inpn 10 units every morning 2 Pen 0    potassium chloride SR (K-TAB) 20 mEq tablet Take 2 Tabs by mouth daily.  20 Tab 0    BD INSULIN PEN NEEDLE UF SHORT 31 gauge x 5/16\" ndle USE AS DIRECTED TWICE A DAY 60 Pen Needle 11  pravastatin (PRAVACHOL) 40 mg tablet Take 1 Tab by mouth nightly. 30 Tab 11    losartan-hydroCHLOROthiazide (HYZAAR) 100-25 mg per tablet TAKE 1 TABLET BY MOUTH DAILY 30 Tab 11    brimonidine (ALPHAGAN) 0.15 % ophthalmic solution Administer 1 Drop to both eyes two (2) times a day.  latanoprost (XALATAN) 0.005 % ophthalmic solution Administer 1 Drop to both eyes nightly. Past History     Past Medical History:  Past Medical History:   Diagnosis Date    [facility] ([facility]     Heart failure ([facility]     unknown to family    Hypercholesteremia     Hypertension     Stroke ([facility]     Thyroid disease        Past Surgical History:  Past Surgical History:   Procedure Laterality Date    HX GYN      HX HEENT      thyroidectomy    HX HYSTERECTOMY      REMOVAL GALLBLADDER      THYROIDECTOMY         Family History:  Family History   Problem Relation Age of Onset    Diabetes Father     No Known Problems Mother        Social History:  Social History   Substance Use Topics    Smoking status: Never Smoker    Smokeless tobacco: Never Used    Alcohol use No      Comment: been years       Allergies:  No Known Allergies      Review of Systems   Review of Systems   Constitutional: Negative for activity change, chills and fever. HENT: Negative for congestion and sore throat. Eyes: Negative for pain and redness. Respiratory: Negative for cough, chest tightness and shortness of breath. Cardiovascular: Negative for chest pain and palpitations. Positive for low blood sugar   Gastrointestinal: Negative for abdominal pain, diarrhea, nausea and vomiting. Genitourinary: Negative for dysuria, frequency and urgency. Musculoskeletal: Negative for back pain and neck pain. Skin: Negative for rash. Neurological: Negative for syncope, light-headedness and headaches. Psychiatric/Behavioral: Positive for confusion. All other systems reviewed and are negative.       Physical Exam   Physical Exam Constitutional: She is oriented to person, place, and time. She appears well-developed and well-nourished. No distress. HENT:   Head: Normocephalic. Nose: Nose normal.   Mouth/Throat: Oropharynx is clear and moist. No oropharyngeal exudate. Eyes: Conjunctivae are normal. Pupils are equal, round, and reactive to light. No scleral icterus. Neck: Normal range of motion. Neck supple. No JVD present. No tracheal deviation present. No thyromegaly present. Cardiovascular: Normal rate, regular rhythm and intact distal pulses. Exam reveals no gallop and no friction rub. No murmur heard. Pulmonary/Chest: Effort normal and breath sounds normal. No stridor. No respiratory distress. She has no wheezes. She has no rales. Abdominal: Soft. Bowel sounds are normal. She exhibits no distension. There is no tenderness. There is no rebound and no guarding. Musculoskeletal: Normal range of motion. She exhibits no edema. Lymphadenopathy:     She has no cervical adenopathy. Neurological: She is alert and oriented to person, place, and time. No cranial nerve deficit. She exhibits normal muscle tone. Coordination normal.   Skin: Skin is warm and dry. No rash noted. She is not diaphoretic. No erythema. Psychiatric: She has a normal mood and affect. Her behavior is normal.   Nursing note and vitals reviewed.       Diagnostic Study Results     Labs -     Recent Results (from the past 12 hour(s))   GLUCOSE, POC    Collection Time: 08/09/18 10:19 AM   Result Value Ref Range    Glucose (POC) 131 (H) 65 - 100 mg/dL    Performed by LISSETH KIMBLE ([facility])    CBC WITH AUTOMATED DIFF    Collection Time: 08/09/18 10:40 AM   Result Value Ref Range    WBC 4.2 3.6 - 11.0 K/uL    RBC 3.75 (L) 3.80 - 5.20 M/uL    HGB 11.9 11.5 - 16.0 g/dL    HCT 35.3 35.0 - 47.0 %    MCV 94.1 80.0 - 99.0 FL    MCH 31.7 26.0 - 34.0 PG    MCHC 33.7 30.0 - 36.5 g/dL    RDW 13.2 11.5 - 14.5 %    PLATELET 634 929 - 630 K/uL    MPV 10.2 8.9 - 12.9 FL    NRBC 0.0 0  WBC    ABSOLUTE NRBC 0.00 0.00 - 0.01 K/uL    NEUTROPHILS 66 32 - 75 %    LYMPHOCYTES 21 12 - 49 %    MONOCYTES 10 5 - 13 %    EOSINOPHILS 3 0 - 7 %    BASOPHILS 0 0 - 1 %    IMMATURE GRANULOCYTES 1 (H) 0.0 - 0.5 %    ABS. NEUTROPHILS 2.8 1.8 - 8.0 K/UL    ABS. LYMPHOCYTES 0.9 0.8 - 3.5 K/UL    ABS. MONOCYTES 0.4 0.0 - 1.0 K/UL    ABS. EOSINOPHILS 0.1 0.0 - 0.4 K/UL    ABS. BASOPHILS 0.0 0.0 - 0.1 K/UL    ABS. IMM. GRANS. 0.0 0.00 - 0.04 K/UL    DF AUTOMATED     METABOLIC PANEL, COMPREHENSIVE    Collection Time: 08/09/18 11:36 AM   Result Value Ref Range    Sodium 141 136 - 145 mmol/L    Potassium 3.5 3.5 - 5.1 mmol/L    Chloride 107 97 - 108 mmol/L    CO2 25 21 - 32 mmol/L    Anion gap 9 5 - 15 mmol/L    Glucose 150 (H) 65 - 100 mg/dL    BUN 12 6 - 20 MG/DL    Creatinine 0.70 0.55 - 1.02 MG/DL    BUN/Creatinine ratio 17 12 - 20      GFR est AA >60 >60 ml/min/1.73m2    GFR est non-AA >60 >60 ml/min/1.73m2    Calcium 7.9 (L) 8.5 - 10.1 MG/DL    Bilirubin, total 0.3 0.2 - 1.0 MG/DL    ALT (SGPT) 22 12 - 78 U/L    AST (SGOT) 20 15 - 37 U/L    Alk. phosphatase 63 45 - 117 U/L    Protein, total 6.1 (L) 6.4 - 8.2 g/dL    Albumin 3.1 (L) 3.5 - 5.0 g/dL    Globulin 3.0 2.0 - 4.0 g/dL    A-G Ratio 1.0 (L) 1.1 - 2.2     GLUCOSE, POC    Collection Time: 08/09/18 12:25 PM   Result Value Ref Range    Glucose (POC) 157 (H) 65 - 100 mg/dL    Performed by Adron Cogan Decision Making   I am the first provider for this patient. I reviewed the vital signs, available nursing notes, past medical history, past surgical history, family history and social history. Vital Signs-Reviewed the patient's vital signs.   Patient Vitals for the past 12 hrs:   Temp Pulse Resp BP SpO2   08/09/18 1013 97.5 °F (36.4 °C) 69 18 182/66 99 %       Pulse Oximetry Analysis - 99% on room air    Cardiac Monitor:   Rate: 69 bpm  Rhythm: Normal Sinus Rhythm 182/66     Records Reviewed: Nursing Notes, Old Medical Records and Ambulance Run Sheet    Provider Notes (Medical Decision Making):   DDx: hypoglycemia, accidental insulin overdose    ED Course:   Initial assessment performed. The patients presenting problems have been discussed, and they are in agreement with the care plan formulated and outlined with them. I have encouraged them to ask questions as they arise throughout their visit. Disposition:  DISCHARGE NOTE  12:29 PM  The patient has been re-evaluated and is ready for discharge. Reviewed available results with patient. Counseled patient on diagnosis and care plan. Patient has expressed understanding, and all questions have been answered. Patient agrees with plan and agrees to follow up as recommended, or return to the ED if their symptoms worsen. Discharge instructions have been provided and explained to the patient, along with reasons to return to the ED. Blood sugar stabilized; pt without complaints; dc home with pcp follow up. Kayleigh Sheppard MD      PLAN:  1. Current Discharge Medication List        2. Follow-up Information     Follow up With Details Comments [id number] [place] Schedule an appointment as soon as possible for a visit in 1 day  [facility]  Kesha Higginbotham [place] [address]  [phone]      [facility] EMERGENCY DEPT Go in 1 day If symptoms worsen [address] [place] [address]  [phone]        Return to ED if worse     Diagnosis     Clinical Impression:   1. Hypoglycemia        Attestations: This note is prepared by Consuello Barthel, acting as Scribe for Kayleigh Sheppard MD.    Kayleigh Sheppard MD: The scribe's documentation has been prepared under my direction and personally reviewed by me in its entirety. I confirm that the note above accurately reflects all work, treatment, procedures, and medical decision making performed by me.

## 2018-08-21 ENCOUNTER — HOSPITAL ENCOUNTER (EMERGENCY)
Age: 78
Discharge: HOME | End: 2018-08-21
Attending: EMERGENCY MEDICINE
Payer: MEDICARE

## 2018-08-21 VITALS
WEIGHT: 164 LBS | DIASTOLIC BLOOD PRESSURE: 20 MMHG | TEMPERATURE: 98.2 F | HEIGHT: 63 IN | HEART RATE: 74 BPM | SYSTOLIC BLOOD PRESSURE: 162 MMHG | BODY MASS INDEX: 29.06 KG/M2 | RESPIRATION RATE: 16 BRPM | OXYGEN SATURATION: 99 %

## 2018-08-21 DIAGNOSIS — E16.2 HYPOGLYCEMIA: Primary | ICD-10-CM

## 2018-08-21 LAB
ANION GAP SERPL CALC-SCNC: 8 MMOL/L (ref 5–15)
APPEARANCE UR: CLEAR
BASOPHILS # BLD: 0 K/UL (ref 0–0.1)
BASOPHILS NFR BLD: 0 % (ref 0–1)
BILIRUB UR QL: NEGATIVE
BUN SERPL-MCNC: 14 MG/DL (ref 6–20)
BUN/CREAT SERPL: 18 (ref 12–20)
CALCIUM SERPL-MCNC: 8.4 MG/DL (ref 8.5–10.1)
CHLORIDE SERPL-SCNC: 105 MMOL/L (ref 97–108)
CO2 SERPL-SCNC: 25 MMOL/L (ref 21–32)
COLOR UR: ABNORMAL
CREAT SERPL-MCNC: 0.76 MG/DL (ref 0.55–1.02)
DIFFERENTIAL METHOD BLD: ABNORMAL
EOSINOPHIL # BLD: 0 K/UL (ref 0–0.4)
EOSINOPHIL NFR BLD: 1 % (ref 0–7)
ERYTHROCYTE [DISTWIDTH] IN BLOOD BY AUTOMATED COUNT: 13 % (ref 11.5–14.5)
GLUCOSE BLD STRIP.AUTO-MCNC: 186 MG/DL (ref 65–100)
GLUCOSE BLD STRIP.AUTO-MCNC: 204 MG/DL (ref 65–100)
GLUCOSE BLD STRIP.AUTO-MCNC: 273 MG/DL (ref 65–100)
GLUCOSE SERPL-MCNC: 220 MG/DL (ref 65–100)
GLUCOSE UR STRIP.AUTO-MCNC: 500 MG/DL
HCT VFR BLD AUTO: 34.1 % (ref 35–47)
HGB BLD-MCNC: 11.7 G/DL (ref 11.5–16)
HGB UR QL STRIP: NEGATIVE
IMM GRANULOCYTES # BLD: 0 K/UL (ref 0–0.04)
IMM GRANULOCYTES NFR BLD AUTO: 0 % (ref 0–0.5)
KETONES UR QL STRIP.AUTO: NEGATIVE MG/DL
LEUKOCYTE ESTERASE UR QL STRIP.AUTO: NEGATIVE
LYMPHOCYTES # BLD: 0.6 K/UL (ref 0.8–3.5)
LYMPHOCYTES NFR BLD: 14 % (ref 12–49)
MCH RBC QN AUTO: 32.1 PG (ref 26–34)
MCHC RBC AUTO-ENTMCNC: 34.3 G/DL (ref 30–36.5)
MCV RBC AUTO: 93.4 FL (ref 80–99)
MONOCYTES # BLD: 0.4 K/UL (ref 0–1)
MONOCYTES NFR BLD: 8 % (ref 5–13)
NEUTS SEG # BLD: 3.5 K/UL (ref 1.8–8)
NEUTS SEG NFR BLD: 77 % (ref 32–75)
NITRITE UR QL STRIP.AUTO: NEGATIVE
NRBC # BLD: 0 K/UL (ref 0–0.01)
NRBC BLD-RTO: 0 PER 100 WBC
PH UR STRIP: 7 [PH] (ref 5–8)
PLATELET # BLD AUTO: 267 K/UL (ref 150–400)
PMV BLD AUTO: 9.6 FL (ref 8.9–12.9)
POTASSIUM SERPL-SCNC: 3.9 MMOL/L (ref 3.5–5.1)
PROT UR STRIP-MCNC: NEGATIVE MG/DL
RBC # BLD AUTO: 3.65 M/UL (ref 3.8–5.2)
RBC MORPH BLD: ABNORMAL
SERVICE CMNT-IMP: ABNORMAL
SODIUM SERPL-SCNC: 138 MMOL/L (ref 136–145)
SP GR UR REFRACTOMETRY: 1.01 (ref 1–1.03)
UROBILINOGEN UR QL STRIP.AUTO: 0.2 EU/DL (ref 0.2–1)
WBC # BLD AUTO: 4.5 K/UL (ref 3.6–11)

## 2018-08-21 PROCEDURE — 82962 GLUCOSE BLOOD TEST: CPT

## 2018-08-21 PROCEDURE — 80048 BASIC METABOLIC PNL TOTAL CA: CPT | Performed by: EMERGENCY MEDICINE

## 2018-08-21 PROCEDURE — 99285 EMERGENCY DEPT VISIT HI MDM: CPT

## 2018-08-21 PROCEDURE — 36415 COLL VENOUS BLD VENIPUNCTURE: CPT | Performed by: EMERGENCY MEDICINE

## 2018-08-21 PROCEDURE — 85025 COMPLETE CBC W/AUTO DIFF WBC: CPT | Performed by: EMERGENCY MEDICINE

## 2018-08-21 PROCEDURE — 81003 URINALYSIS AUTO W/O SCOPE: CPT | Performed by: EMERGENCY MEDICINE

## 2018-08-21 RX ORDER — INSULIN DEGLUDEC 100 U/ML
24 INJECTION, SOLUTION SUBCUTANEOUS DAILY
COMMUNITY
End: 2018-08-31

## 2018-08-21 NOTE — DISCHARGE INSTRUCTIONS
Hypoglycemia: Care Instructions  Your Care Instructions  Hypoglycemia means that your blood sugar is low and your body is not getting enough fuel. Some people get low blood sugar from not eating often enough. Some medicines to treat diabetes can cause low blood sugar. People who have had surgery on their stomachs or intestines may get hypoglycemia. Problems with the pancreas, kidneys, or liver also can cause low blood sugar. A snack or drink with sugar in it will raise your blood sugar and should ease your symptoms right away. Your doctor may recommend that you change or stop your medicines until you can get your blood sugar levels under control. In the long run, you may need to change your diet and eating habits so that you get enough fuel for your body throughout the day. Follow-up care is a key part of your treatment and safety. Be sure to make and go to all appointments, and call your doctor if you are having problems. It's also a good idea to know your test results and keep a list of the medicines you take. How can you care for yourself at home? · Learn to recognize the early signs of low blood sugar. Signs include:  ¨ Nausea. ¨ Hunger. ¨ Feeling nervous, irritable, or shaky. ¨ Cold, clammy, wet skin. ¨ Sweating (when you are not exercising). ¨ A fast heartbeat. ¨ Numbness or tingling of the fingertips or lips. · If you feel an episode of low blood sugar coming on, drink fruit juice or sugared (not diet) soda, or eat sugar in the form of candy, cubes, or tablets. [employer] are another [employer]. · Eat small, frequent meals so that you do not get too hungry between meals. · Balance extra exercise with eating more. · Keep a written record of your low blood sugar episodes, including when you last ate and what you ate, so that you can learn what causes your blood sugar to drop.   · Make sure your family, friends, and coworkers know the symptoms of low blood sugar and know what to do to get your sugar level up. · Wear medical alert jewelry that lists your condition. You can buy this at most drugstores. When should you call for help? Call 911 anytime you think you may need emergency care. For example, call if:    · You passed out (lost consciousness).     · You are confused or cannot think clearly.     · Your blood sugar is very high or very low.    Watch closely for changes in your health, and be sure to contact your doctor if:    · Your blood sugar stays outside the level your doctor set for you.     · You have any problems. Where can you learn more? Go to http://toi-sunil.info/. Enter B306 in the search box to learn more about \"Hypoglycemia: Care Instructions. \"  Current as of: December 7, 2017  Content Version: 11.7  © 0271-5063 [employer]. Care instructions adapted under license by [employer] (which disclaims liability or warranty for this information). If you have questions about a medical condition or this instruction, always ask your healthcare professional. [address] any warranty or liability for your use of this information.

## 2018-08-21 NOTE — ED TRIAGE NOTES
Pt arrived from EMS with low blood sugar, EMS treated in field, pt has Hx of having low blood sugars in the AM, pt resting comfortably, a/ox4, with no complaints at this time.

## 2018-08-21 NOTE — ED PROVIDER NOTES
EMERGENCY DEPARTMENT HISTORY AND PHYSICAL EXAM      Date: 8/21/2018  Patient Name: Christine García    History of Presenting Illness     Chief Complaint   Patient presents with    Low Blood Sugar     EMS reports pt found this morning with low blood sugar, pt was unconscious, EMS gave D50 and food brought up sugar to 200s. History Provided By: Patient    HPI: Christine García, 69 y.o. female with PMHx significant for DM, CHF, thyroid disease, HTN, hypercholesterolemia, stroke, presents via EMS to the ED for further evaluation of being found semi-unresponsive by son this morning. Pt reports no physical sx. EMS expresses the pt lives at home with son when he found her on the floor semi-conscious this morning leading him to call EMS. EMS discloses upon arrival pt's BG was 20 noting a line was placed and she was given D50 and a Bologna sandwich with improvement of her BG to the 200's. Per EMS the pt is a regular and normally refuses transport to the ED but given the frequency of hypoglycemia episodes the pt was brought to the ED for evaluation. Pt discloses she did not eat this morning which may have caused her sx also noting ~1-2 months ago her insulin dose was cut to 24 units / day but denies any other medication adjustments. Pt denies any exacerbating or alleviating factors and denies any associated sx. She denies any fevers, chills, chest pain, SOB, abdominal pain, nausea, vomiting, diarrhea, dysuria, or hematuria. There are no other complaints, changes, or physical findings at this time. PCP: Barb Zambrano MD    Current Outpatient Prescriptions   Medication Sig Dispense Refill    BD ULTRA-FINE SHORT PEN NEEDLE 31 gauge x 5/16\" ndle USE AS DIRECTED TWICE A DAY. dx.e11.9 100 Package 3    fenofibrate nanocrystallized (TRICOR) 145 mg tablet TAKE 1 TABLET BY MOUTH EVERY DAY 30 Tab 11    levothyroxine (SYNTHROID) 150 mcg tablet TAKE 1 TABLET BY MOUTH EVERY DAY 30 Tab 11    clopidogrel (PLAVIX) 75 mg tab TAKE 1 TAB BY MOUTH DAILY. 30 Tab 11    amLODIPine (NORVASC) 10 mg tablet TAKE 1 TABLET BY MOUTH EVERY MORNING 90 Tab 3    insulin degludec (TRESIBA FLEXTOUCH U-100) 100 unit/mL (3 mL) inpn 10 units every morning 2 Pen 0    potassium chloride SR (K-TAB) 20 mEq tablet Take 2 Tabs by mouth daily. 20 Tab 0    BD INSULIN PEN NEEDLE UF SHORT 31 gauge x 5/16\" ndle USE AS DIRECTED TWICE A DAY 60 Pen Needle 11    pravastatin (PRAVACHOL) 40 mg tablet Take 1 Tab by mouth nightly. 30 Tab 11    losartan-hydroCHLOROthiazide (HYZAAR) 100-25 mg per tablet TAKE 1 TABLET BY MOUTH DAILY 30 Tab 11    brimonidine (ALPHAGAN) 0.15 % ophthalmic solution Administer 1 Drop to both eyes two (2) times a day.  latanoprost (XALATAN) 0.005 % ophthalmic solution Administer 1 Drop to both eyes nightly. Past History     Past Medical History:  Past Medical History:   Diagnosis Date    [facility]     Heart failure ([facility] [facility]     unknown to family    Hypercholesteremia     Hypertension     Stroke ([facility] [facility]     Thyroid disease        Past Surgical History:  Past Surgical History:   Procedure Laterality Date    HX GYN      HX HEENT      thyroidectomy    HX HYSTERECTOMY      REMOVAL GALLBLADDER      THYROIDECTOMY         Family History:  Family History   Problem Relation Age of Onset    Diabetes Father     No Known Problems Mother        Social History:  Social History   Substance Use Topics    Smoking status: Never Smoker    Smokeless tobacco: Never Used    Alcohol use No      Comment: been years       Allergies:  No Known Allergies      Review of Systems   Review of Systems   Constitutional: Negative for activity change, appetite change, chills, fever and unexpected weight change. HENT: Negative for congestion. Eyes: Negative for pain and visual disturbance. Respiratory: Negative for cough and shortness of breath. Cardiovascular: Negative for chest pain.    Gastrointestinal: Negative for abdominal pain, diarrhea, nausea and vomiting. Genitourinary: Negative for dysuria and hematuria. Musculoskeletal: Negative for back pain. Skin: Negative for rash. Neurological: Negative for headaches. Physical Exam   Physical Exam   Constitutional: She is oriented to person, place, and time. She appears well-developed and well-nourished. Well appearing elderly female in minimal acute distress   HENT:   Head: Normocephalic and atraumatic. Mouth/Throat: Oropharynx is clear and moist.   Eyes: Conjunctivae and EOM are normal. Pupils are equal, round, and reactive to light. Right eye exhibits no discharge. Left eye exhibits no discharge. Neck: Normal range of motion. Neck supple. Cardiovascular: Normal rate, regular rhythm and normal heart sounds. No murmur heard. Pulmonary/Chest: Effort normal and breath sounds normal. No respiratory distress. She has no wheezes. She has no rales. Abdominal: Soft. Bowel sounds are normal. She exhibits no distension. There is no tenderness. Musculoskeletal: Normal range of motion. She exhibits no edema. Neurological: She is alert and oriented to person, place, and time. No cranial nerve deficit. She exhibits normal muscle tone. Skin: Skin is warm and dry. No rash noted. She is not diaphoretic. Nursing note and vitals reviewed.       Diagnostic Study Results     Labs -     Recent Results (from the past 12 hour(s))   GLUCOSE, POC    Collection Time: 08/21/18  9:16 AM   Result Value Ref Range    Glucose (POC) 186 (H) 65 - 100 mg/dL    Performed by Eric Joseph    CBC WITH AUTOMATED DIFF    Collection Time: 08/21/18  9:49 AM   Result Value Ref Range    WBC 4.5 3.6 - 11.0 K/uL    RBC 3.65 (L) 3.80 - 5.20 M/uL    HGB 11.7 11.5 - 16.0 g/dL    HCT 34.1 (L) 35.0 - 47.0 %    MCV 93.4 80.0 - 99.0 FL    MCH 32.1 26.0 - 34.0 PG    MCHC 34.3 30.0 - 36.5 g/dL    RDW 13.0 11.5 - 14.5 %    PLATELET 044 359 - 648 K/uL    MPV 9.6 8.9 - 12.9 FL    NRBC 0.0 0  WBC    ABSOLUTE NRBC 0. 00 0.00 - 0.01 K/uL    NEUTROPHILS 77 (H) 32 - 75 %    LYMPHOCYTES 14 12 - 49 %    MONOCYTES 8 5 - 13 %    EOSINOPHILS 1 0 - 7 %    BASOPHILS 0 0 - 1 %    IMMATURE GRANULOCYTES 0 0.0 - 0.5 %    ABS. NEUTROPHILS 3.5 1.8 - 8.0 K/UL    ABS. LYMPHOCYTES 0.6 (L) 0.8 - 3.5 K/UL    ABS. MONOCYTES 0.4 0.0 - 1.0 K/UL    ABS. EOSINOPHILS 0.0 0.0 - 0.4 K/UL    ABS. BASOPHILS 0.0 0.0 - 0.1 K/UL    ABS. IMM. GRANS. 0.0 0.00 - 0.04 K/UL    DF AUTOMATED      RBC COMMENTS NORMOCYTIC, NORMOCHROMIC     METABOLIC PANEL, BASIC    Collection Time: 08/21/18  9:49 AM   Result Value Ref Range    Sodium 138 136 - 145 mmol/L    Potassium 3.9 3.5 - 5.1 mmol/L    Chloride 105 97 - 108 mmol/L    CO2 25 21 - 32 mmol/L    Anion gap 8 5 - 15 mmol/L    Glucose 220 (H) 65 - 100 mg/dL    BUN 14 6 - 20 MG/DL    Creatinine 0.76 0.55 - 1.02 MG/DL    BUN/Creatinine ratio 18 12 - 20      GFR est AA >60 >60 ml/min/1.73m2    GFR est non-AA >60 >60 ml/min/1.73m2    Calcium 8.4 (L) 8.5 - 10.1 MG/DL   GLUCOSE, POC    Collection Time: 08/21/18 10:10 AM   Result Value Ref Range    Glucose (POC) 204 (H) 65 - 100 mg/dL    Performed by Kamlesh FELDMAN (SON)    URINALYSIS W/ RFLX MICROSCOPIC    Collection Time: 08/21/18 10:32 AM   Result Value Ref Range    Color YELLOW/STRAW      Appearance CLEAR CLEAR      Specific gravity 1.011 1.003 - 1.030      pH (UA) 7.0 5.0 - 8.0      Protein NEGATIVE  NEG mg/dL    Glucose 500 (A) NEG mg/dL    Ketone NEGATIVE  NEG mg/dL    Bilirubin NEGATIVE  NEG      Blood NEGATIVE  NEG      Urobilinogen 0.2 0.2 - 1.0 EU/dL    Nitrites NEGATIVE  NEG      Leukocyte Esterase NEGATIVE  NEG     GLUCOSE, POC    Collection Time: 08/21/18 11:13 AM   Result Value Ref Range    Glucose (POC) 273 (H) 65 - 100 mg/dL    Performed by Catrachito Mendoza (BRIA)          Medical Decision Making   I am the first provider for this patient.     I reviewed the vital signs, available nursing notes, past medical history, past surgical history, family history and social history. Vital Signs-Reviewed the patient's vital signs. Patient Vitals for the past 12 hrs:   Temp Pulse Resp BP SpO2   08/21/18 0930 - 69 16 174/79 97 %   08/21/18 0923 98.2 °F (36.8 °C) 74 22 (!) 205/66 97 %       Pulse Oximetry Analysis - 97% on room air    Cardiac Monitor:   Rate: 74 bpm  Rhythm: Normal Sinus Rhythm      Records Reviewed: Nursing Notes, Old Medical Records, Previous electrocardiograms, Ambulance Run Sheet, Previous Radiology Studies and Previous Laboratory Studies    Provider Notes (Medical Decision Making): Well appearing elderly female without evidence of acute infection. Recurrent hypoglycemia concerning for poor care at home vs non-compliance. ED Course:   Initial assessment performed. The patients presenting problems have been discussed, and they are in agreement with the care plan formulated and outlined with them. I have encouraged them to ask questions as they arise throughout their visit. Progress Notes:    CONSULT NOTE:  11:36 AM  Bobby Ragsdale MD spoke with Dr. Stella Bañuelos,  Specialty: PCP  Discussed pt's hx, disposition, and available diagnostic and imaging results. Reviewed care plans. Consultant recommends the pt eat Q4 hours because he does not want to decrease her insulin because her A1C is high. He has discussed dietary regulation with the pt several times and recommends reiterating this to the pt. Written by CARLOS Baezibfito, as dictated by Bobby Ragsdale MD    11:47 AM   The pt has been re-evaluated. Discussed diabetic diet with pt and provided sample diet for a few days. Critical Care Time: 0 minutes    Disposition:  Discharge Note:  11:48 AM  The patient is ready for discharge. The patient's signs, symptoms, diagnosis, and discharge instruction have been discussed and the patient has conveyed their understanding. The patient is to follow up as recommended or return to the ER should their symptoms worsen.  Plan has been discussed and the patient is in agreement. Written by Poly Sanders ED Scribe, as dictated by Fran Brooks MD    PLAN:  1. Current Discharge Medication List        2. Follow-up Information     Follow up With Details Comments Contact Kim Odell MD Call today to discuss further medication changes [address]  [phone]      [facility] EMERGENCY DEPT  If symptoms worsen [address] [place] [address]  [phone]        Return to ED if worse     Diagnosis     Clinical Impression:   1. Hypoglycemia        Attestations:    Attestation: This note is prepared by Violet Sanders, acting as Scribe for Fran Brooks MD.      Fran Brooks MD: The scribe's documentation has been prepared under my direction and personally reviewed by me in its entirety. I confirm that the note above accurately reflects all work, treatment, procedures, and medical decision making performed by me.

## 2018-08-21 NOTE — ED NOTES
Patient discharged by Dayna Lafleur MD. Patient provided with discharge instructions Rx and instructions on follow up care. Patient out of ED Wheelchair accompanied by family.

## 2018-08-21 NOTE — ED NOTES
Pt. Assisted to use bed pan at this time. PT. Tolerated well. Pt. Placed back in position of comfort with call bell in reach.

## 2018-08-21 NOTE — PROGRESS NOTES
Pharmacy Clarification of Prior to Admission Medication Regimen     The patient was interviewed regarding clarification of the prior to admission medication regimen and was questioned regarding use of any other inhalers, topical products, over the counter medications, herbal medications, vitamin products or ophthalmic/nasal/otic medication use. Information Obtained From: Rx Query and patient    Pertinent Pharmacy Findings: None    PTA medication list was corrected to the following:     Prior to Admission Medications   Prescriptions Last Dose Informant Patient Reported? Taking? amLODIPine (NORVASC) 10 mg tablet 2018 at Unknown time Self No Yes   Sig: TAKE 1 TABLET BY MOUTH EVERY MORNING   brimonidine (ALPHAGAN) 0.15 % ophthalmic solution 2018 at Unknown time Self Yes Yes   Sig: Administer 1 Drop to both eyes two (2) times a day. clopidogrel (PLAVIX) 75 mg tab 2018 at Unknown time Self No Yes   Sig: TAKE 1 TAB BY MOUTH DAILY. fenofibrate nanocrystallized (TRICOR) 145 mg tablet 2018 at Unknown time Self No Yes   Sig: TAKE 1 TABLET BY MOUTH EVERY DAY   insulin degludec (TRESIBA FLEXTOUCH U-100) 100 unit/mL (3 mL) inpn 2018 at Unknown time Self Yes Yes   Si Units by SubCUTAneous route daily. levothyroxine (SYNTHROID) 150 mcg tablet 2018 at Unknown time Self No Yes   Sig: TAKE 1 TABLET BY MOUTH EVERY DAY   losartan-hydroCHLOROthiazide (HYZAAR) 100-25 mg per tablet 2018 at Unknown time Self No Yes   Sig: TAKE 1 TABLET BY MOUTH DAILY   pravastatin (PRAVACHOL) 40 mg tablet 2018 at Unknown time Self No Yes   Sig: Take 1 Tab by mouth nightly.       Facility-Administered Medications: None          Thank you,  Jaden Lopez [facility]  Medication History Pharmacy Technician

## 2018-08-24 ENCOUNTER — OFFICE VISIT (OUTPATIENT)
Dept: INTERNAL MEDICINE CLINIC | Age: 78
End: 2018-08-24

## 2018-08-24 VITALS
BODY MASS INDEX: 29.2 KG/M2 | WEIGHT: 164.8 LBS | SYSTOLIC BLOOD PRESSURE: 150 MMHG | OXYGEN SATURATION: 98 % | DIASTOLIC BLOOD PRESSURE: 69 MMHG | TEMPERATURE: 98.1 F | HEART RATE: 70 BPM | HEIGHT: 63 IN | RESPIRATION RATE: 16 BRPM

## 2018-08-24 DIAGNOSIS — E03.2 HYPOTHYROIDISM DUE TO MEDICATION: ICD-10-CM

## 2018-08-24 DIAGNOSIS — Z79.4 TYPE 2 DIABETES MELLITUS WITHOUT COMPLICATION, WITH LONG-TERM CURRENT USE OF INSULIN (HCC): Primary | ICD-10-CM

## 2018-08-24 DIAGNOSIS — E11.9 TYPE 2 DIABETES MELLITUS WITHOUT COMPLICATION, WITH LONG-TERM CURRENT USE OF INSULIN (HCC): Primary | ICD-10-CM

## 2018-08-24 DIAGNOSIS — E78.5 DYSLIPIDEMIA: ICD-10-CM

## 2018-08-24 DIAGNOSIS — I10 ESSENTIAL HYPERTENSION: ICD-10-CM

## 2018-08-24 DIAGNOSIS — R41.3 MEMORY DEFICIT: ICD-10-CM

## 2018-08-24 NOTE — MR AVS SNAPSHOT
[facility] 
 
 
 [address] [id number] 
[phone] Patient: Ashanti Solis MRN: [id number] RTS:[id number] Visit Information Date & Time Provider Department Dept. Phone Encounter #  
 8/24/2018 12:45 PM [facility] [phone] [id number] Your Appointments 10/11/2018  4:30 PM  
Any with Janet Gardiner MD  
[address] [place] [address] and Primary Care [address] [place] [address]) Appt Note: 3 month follow up  
 [address] [facility]  
  
   
 [address] [id number] Upcoming Health Maintenance Date Due  
 FOOT EXAM Q1 5/8/2016 LIPID PANEL Q1 7/14/2017 Influenza Age 5 to Adult 8/1/2018 EYE EXAM RETINAL OR DILATED Q1 9/24/2018* MEDICARE YEARLY EXAM 10/13/2018 HEMOGLOBIN A1C Q6M 1/10/2019 MICROALBUMIN Q1 7/10/2019 GLAUCOMA SCREENING Q2Y 12/21/2019 DTaP/Tdap/Td series (2 - Td) 2/2/2027 *Topic was postponed. The date shown is not the original due date. Allergies as of 8/24/2018  Review Complete On: 8/24/2018 By: Jatinder Mcwilliams No Known Allergies Current Immunizations  Reviewed on 12/25/2017 Name Date Influenza Vaccine Split  Deferred (Patient Refused) ZZZ-RETIRED (DO NOT USE) Pneumococcal Vaccine (Unspecified Type) 11/11/2012 12:57 PM  
  
 Not reviewed this visit You Were Diagnosed With   
  
 Codes Comments Type 2 diabetes mellitus without complication, with long-term current use of insulin (HCC)    -  Primary ICD-10-CM: E11.9, Z79.4 ICD-9-CM: 250.00, V58.67 Essential hypertension     ICD-10-CM: I10 
ICD-9-CM: 401.9 Memory deficit     ICD-10-CM: R41.3 ICD-9-CM: 780.93 Dyslipidemia     ICD-10-CM: E78.5 ICD-9-CM: 272.4 Vitals BP Pulse Temp Resp Height(growth percentile) Weight(growth percentile) 150/69 70 98.1 °F (36.7 °C) (Oral) 16 5' 3\" (1.6 m) 164 lb 12.8 oz (74.8 kg) SpO2 BMI OB Status Smoking Status 98% 29.19 kg/m2 Hysterectomy Never Smoker Vitals History BMI and BSA Data Body Mass Index Body Surface Area  
 29.19 kg/m 2 1.82 m 2 Preferred Pharmacy Pharmacy Name Phone [facility][id number] Duane Harold, [address] [place] [address] [phone] Your Updated Medication List  
  
   
This list is accurate as of 8/24/18  2:51 PM.  Always use your most recent med list. amLODIPine 10 mg tablet Commonly known as:  Voncile Morrill TAKE 1 TABLET BY MOUTH EVERY MORNING  
  
 brimonidine 0.15 % ophthalmic solution Commonly known as:  Neida Camera Administer 1 Drop to both eyes two (2) times a day. clopidogrel 75 mg Tab Commonly known as:  PLAVIX TAKE 1 TAB BY MOUTH DAILY. fenofibrate nanocrystallized 145 mg tablet Commonly known as:  TRICOR  
TAKE 1 TABLET BY MOUTH EVERY DAY  
  
 levothyroxine 150 mcg tablet Commonly known as:  SYNTHROID  
TAKE 1 TABLET BY MOUTH EVERY DAY  
  
 losartan-hydroCHLOROthiazide 100-25 mg per tablet Commonly known as:  HYZAAR  
TAKE 1 TABLET BY MOUTH DAILY pravastatin 40 mg tablet Commonly known as:  PRAVACHOL Take 1 Tab by mouth nightly. TRESIBA FLEXTOUCH U-100 100 unit/mL (3 mL) Inpn Generic drug:  insulin degludec 24 Units by SubCUTAneous route daily. We Performed the Following FRUCTOSAMINE [78592 CPT(R)] Introducing [facility]! [employer] introduces [employer] patient portal. Now you can access parts of your medical record, email your doctor's office, and request medication refills online. 1. In your internet browser, go to [email or web address] [employer] 2. Click on the First Time User? Click Here link in the Sign In box. You will see the New Member Sign Up page. 3. Enter your [employer] Access Code exactly as it appears below.  You will not need to use this code after youve completed the sign-up process. If you do not sign up before the expiration date, you must request a new code. · [employer] Access Code: [email or web address] Expires: 10/8/2018  5:25 PM 
 
4. Enter the last four digits of your Social Security Number (xxxx) and Date of Birth (mm/dd/yyyy) as indicated and click Submit. You will be taken to the next sign-up page. 5. Create a [employer] ID. This will be your [employer] login ID and cannot be changed, so think of one that is secure and easy to remember. 6. Create a [employer] password. You can change your password at any time. 7. Enter your Password Reset Question and Answer. This can be used at a later time if you forget your password. 8. Enter your e-mail address. You will receive e-mail notification when new information is available in [address]. 9. Click Sign Up. You can now view and download portions of your medical record. 10. Click the Download Summary menu link to download a portable copy of your medical information. If you have questions, please visit the Frequently Asked Questions section of the [employer] website. Remember, [employer] is NOT to be used for urgent needs. For medical emergencies, dial 911. Now available from your iPhone and Android! Please provide this summary of care documentation to your next provider. Your primary care clinician is listed as William Doll. If you have any questions after today's visit, please call [phone].

## 2018-08-24 NOTE — PROGRESS NOTES
Chief Complaint   Patient presents with   Carlie ED Follow-up     Patient seen at [facility] for hypoglycemia on 8/21/18. 1. Have you been to the ER, urgent care clinic since your last visit? Hospitalized since your last visit? Yes When: 8/21/18 Where: [facility] Reason for visit: hypoglycemia    2. Have you seen or consulted any other health care providers outside of the [address] [place] [address] since your last visit? Include any pap smears or colon screening.  No

## 2018-08-25 LAB — FRUCTOSAMINE SERPL-SCNC: 642 UMOL/L (ref 0–285)

## 2018-08-25 NOTE — PROGRESS NOTES
[facility]  [place] [address]  [address] [facility] [address]  Phone:  [phone]  Fax: [phone]       Chief Complaint   Patient presents with   [facility] Follow-up     Patient seen at [facility] for hypoglycemia on 8/21/18. .      SUBJECTIVE:    Cristina Benitez is a 69 y.o. female Comes in for return visit stating she has not done well. She's been having recurrent episodes of hypoglycemia. She's been to the ER on several occasions. Hgb A1c is 13. The only conclusion one can draw from this is the fact that the patient is not eating in a timely fashion. I have attempted to ingrain in her the importance of doing so. I attempted to increase her Benny Heater, which has a very low incidence of hypoglycemia, and once this was increased by 10 units, giving a Hgb A1c of 12, which had not been a major problem, she had hypoglycemia. Complicating this is the fact that her short term memory is impaired. I've observed this before. She has a past history of primary hypertension, dyslipidemia and hypothyroidism. She is indeed compliant with this medication. Current Outpatient Prescriptions   Medication Sig Dispense Refill    insulin degludec (TRESIBA FLEXTOUCH U-100) 100 unit/mL (3 mL) inpn 24 Units by SubCUTAneous route daily.  fenofibrate nanocrystallized (TRICOR) 145 mg tablet TAKE 1 TABLET BY MOUTH EVERY DAY 30 Tab 11    levothyroxine (SYNTHROID) 150 mcg tablet TAKE 1 TABLET BY MOUTH EVERY DAY 30 Tab 11    clopidogrel (PLAVIX) 75 mg tab TAKE 1 TAB BY MOUTH DAILY. 30 Tab 11    amLODIPine (NORVASC) 10 mg tablet TAKE 1 TABLET BY MOUTH EVERY MORNING 90 Tab 3    pravastatin (PRAVACHOL) 40 mg tablet Take 1 Tab by mouth nightly. 30 Tab 11    losartan-hydroCHLOROthiazide (HYZAAR) 100-25 mg per tablet TAKE 1 TABLET BY MOUTH DAILY 30 Tab 11    brimonidine (ALPHAGAN) 0.15 % ophthalmic solution Administer 1 Drop to both eyes two (2) times a day.        Past Medical History: Diagnosis Date    Diabetes ([facility]     Heart failure ([facility]     unknown to family    Hypercholesteremia     Hypertension     Stroke ([facility]     Thyroid disease      Past Surgical History:   Procedure Laterality Date    HX GYN      HX HEENT      thyroidectomy    HX HYSTERECTOMY      REMOVAL GALLBLADDER      THYROIDECTOMY       No Known Allergies      REVIEW OF SYSTEMS:  General: negative for - chills or fever  ENT: negative for - headaches, nasal congestion or tinnitus  Respiratory: negative for - cough, hemoptysis, shortness of breath or wheezing  Cardiovascular : negative for - chest pain, edema, palpitations or shortness of breath  Gastrointestinal: negative for - abdominal pain, blood in stools, heartburn or nausea/vomiting  Genito-Urinary: no dysuria, trouble voiding, or hematuria  Musculoskeletal: negative for - gait disturbance, joint pain, joint stiffness or joint swelling  Neurological: no TIA or stroke symptoms  Hematologic: no bruises, no bleeding, no swollen glands  Integument: no lumps, mole changes, nail changes or rash  Endocrine: no malaise/lethargy or unexpected weight changes      Social History     Social History    Marital status:      Spouse name: N/A    Number of children: 1    Years of education: N/A     Occupational History    retired      Social History Main Topics    Smoking status: Never Smoker    Smokeless tobacco: Never Used    Alcohol use No      Comment: been years    Drug use: No    Sexual activity: Not Currently     Other Topics Concern    None     Social History Narrative     Family History   Problem Relation Age of Onset    Diabetes Father     No Known Problems Mother        OBJECTIVE:    Visit Vitals    /69    Pulse 70    Temp 98.1 °F (36.7 °C) (Oral)    Resp 16    Ht 5' 3\" (1.6 m)    Wt 164 lb 12.8 oz (74.8 kg)    SpO2 98%    BMI 29.19 kg/m2     CONSTITUTIONAL: well , well nourished, appears age appropriate  EYES: perrla, eom intact  ENMT:moist mucous membranes, pharynx clear  NECK: supple. Thyroid normal  RESPIRATORY: Chest: clear to ascultation and percussion   CARDIOVASCULAR: Heart: regular rate and rhythm  GASTROINTESTINAL: Abdomen: soft, bowel sounds active  HEMATOLOGIC: no pathological lymph nodes palpated  MUSCULOSKELETAL: Extremities: no edema, pulse 1+   INTEGUMENT: No unusual rashes or suspicious skin lesions noted. Nails appear normal.  NEUROLOGIC: non-focal exam   MENTAL STATUS: alert and oriented, appropriate affect      ASSESSMENT:  1. Type 2 diabetes mellitus without complication, with long-term current use of insulin ([address]    2. Memory deficit    3. Essential hypertension    4. Dyslipidemia    5. Hypothyroidism due to medication        PLAN:    1. Her diabetes is totally out of control. She has to eat at the same time every day and without this she will have hypoglycemia. The current dose of insulin is grossly inadequate as evidenced by the Hgb A1c of 13. There is nothing more that can be done for this patient without timeliness of eating. She has a son and he has to remind her of the importance of doing this. 2. I think the memory deficit is significant. She made a comment about receiving a check and then promptly losing the check within less than 2-3 hours. She has to have some type of cue to remind her of things that she needs to do and one spot to put everything so she won't forget it. I don't think she has true Alzheimer's, but vascular dementia is probably present. 3. Blood pressure is excellent, no adjustments are made. 4. She will continue statin as prescribed in view of her increased cardiovascular risk. 5. She also has hypothyroidism. Her last TSH was reasonable. .  Orders Placed This Encounter    FRUCTOSAMINE         Follow-up Disposition:  Return in about 3 weeks (around 9/14/2018).       Janet Gardiner MD

## 2018-08-27 ENCOUNTER — HOSPITAL ENCOUNTER (OUTPATIENT)
Age: 78
Setting detail: OBSERVATION
Discharge: HOME | End: 2018-08-31
Attending: EMERGENCY MEDICINE | Admitting: HOSPITALIST
Payer: MEDICARE

## 2018-08-27 DIAGNOSIS — E16.2 HYPOGLYCEMIA: Primary | ICD-10-CM

## 2018-08-27 LAB
ALBUMIN SERPL-MCNC: 3.3 G/DL (ref 3.5–5)
ALBUMIN/GLOB SERPL: 1.1 {RATIO} (ref 1.1–2.2)
ALP SERPL-CCNC: 70 U/L (ref 45–117)
ALT SERPL-CCNC: 22 U/L (ref 12–78)
ANION GAP SERPL CALC-SCNC: 9 MMOL/L (ref 5–15)
APPEARANCE UR: CLEAR
AST SERPL-CCNC: 19 U/L (ref 15–37)
BACTERIA URNS QL MICRO: NEGATIVE /HPF
BASOPHILS # BLD: 0 K/UL (ref 0–0.1)
BASOPHILS NFR BLD: 0 % (ref 0–1)
BILIRUB SERPL-MCNC: 0.3 MG/DL (ref 0.2–1)
BILIRUB UR QL: NEGATIVE
BUN SERPL-MCNC: 16 MG/DL (ref 6–20)
BUN/CREAT SERPL: 24 (ref 12–20)
CALCIUM SERPL-MCNC: 8.2 MG/DL (ref 8.5–10.1)
CHLORIDE SERPL-SCNC: 106 MMOL/L (ref 97–108)
CO2 SERPL-SCNC: 26 MMOL/L (ref 21–32)
COLOR UR: ABNORMAL
CREAT SERPL-MCNC: 0.67 MG/DL (ref 0.55–1.02)
DIFFERENTIAL METHOD BLD: ABNORMAL
EOSINOPHIL # BLD: 0.1 K/UL (ref 0–0.4)
EOSINOPHIL NFR BLD: 2 % (ref 0–7)
EPITH CASTS URNS QL MICRO: ABNORMAL /LPF
ERYTHROCYTE [DISTWIDTH] IN BLOOD BY AUTOMATED COUNT: 13.1 % (ref 11.5–14.5)
GLOBULIN SER CALC-MCNC: 3.1 G/DL (ref 2–4)
GLUCOSE BLD STRIP.AUTO-MCNC: 110 MG/DL (ref 65–100)
GLUCOSE BLD STRIP.AUTO-MCNC: 127 MG/DL (ref 65–100)
GLUCOSE BLD STRIP.AUTO-MCNC: 182 MG/DL (ref 65–100)
GLUCOSE BLD STRIP.AUTO-MCNC: 274 MG/DL (ref 65–100)
GLUCOSE BLD STRIP.AUTO-MCNC: 393 MG/DL (ref 65–100)
GLUCOSE SERPL-MCNC: 155 MG/DL (ref 65–100)
GLUCOSE UR STRIP.AUTO-MCNC: >1000 MG/DL
HCT VFR BLD AUTO: 34.9 % (ref 35–47)
HGB BLD-MCNC: 12.1 G/DL (ref 11.5–16)
HGB UR QL STRIP: NEGATIVE
HYALINE CASTS URNS QL MICRO: ABNORMAL /LPF (ref 0–5)
IMM GRANULOCYTES # BLD: 0 K/UL (ref 0–0.04)
IMM GRANULOCYTES NFR BLD AUTO: 0 % (ref 0–0.5)
KETONES UR QL STRIP.AUTO: NEGATIVE MG/DL
LEUKOCYTE ESTERASE UR QL STRIP.AUTO: NEGATIVE
LYMPHOCYTES # BLD: 0.7 K/UL (ref 0.8–3.5)
LYMPHOCYTES NFR BLD: 20 % (ref 12–49)
MCH RBC QN AUTO: 32.2 PG (ref 26–34)
MCHC RBC AUTO-ENTMCNC: 34.7 G/DL (ref 30–36.5)
MCV RBC AUTO: 92.8 FL (ref 80–99)
MONOCYTES # BLD: 0.4 K/UL (ref 0–1)
MONOCYTES NFR BLD: 10 % (ref 5–13)
NEUTS SEG # BLD: 2.3 K/UL (ref 1.8–8)
NEUTS SEG NFR BLD: 68 % (ref 32–75)
NITRITE UR QL STRIP.AUTO: NEGATIVE
NRBC # BLD: 0 K/UL (ref 0–0.01)
NRBC BLD-RTO: 0 PER 100 WBC
PH UR STRIP: 6 [PH] (ref 5–8)
PLATELET # BLD AUTO: 239 K/UL (ref 150–400)
PMV BLD AUTO: 10.1 FL (ref 8.9–12.9)
POTASSIUM SERPL-SCNC: 3.3 MMOL/L (ref 3.5–5.1)
PROT SERPL-MCNC: 6.4 G/DL (ref 6.4–8.2)
PROT UR STRIP-MCNC: NEGATIVE MG/DL
RBC # BLD AUTO: 3.76 M/UL (ref 3.8–5.2)
RBC #/AREA URNS HPF: ABNORMAL /HPF (ref 0–5)
RBC MORPH BLD: ABNORMAL
SERVICE CMNT-IMP: ABNORMAL
SODIUM SERPL-SCNC: 141 MMOL/L (ref 136–145)
SP GR UR REFRACTOMETRY: 1.03 (ref 1–1.03)
UA: UC IF INDICATED,UAUC: ABNORMAL
UROBILINOGEN UR QL STRIP.AUTO: 1 EU/DL (ref 0.2–1)
WBC # BLD AUTO: 3.5 K/UL (ref 3.6–11)
WBC URNS QL MICRO: ABNORMAL /HPF (ref 0–4)

## 2018-08-27 PROCEDURE — 74011636637 HC RX REV CODE- 636/637: Performed by: HOSPITALIST

## 2018-08-27 PROCEDURE — 99285 EMERGENCY DEPT VISIT HI MDM: CPT

## 2018-08-27 PROCEDURE — 96372 THER/PROPH/DIAG INJ SC/IM: CPT

## 2018-08-27 PROCEDURE — 74011000250 HC RX REV CODE- 250: Performed by: HOSPITALIST

## 2018-08-27 PROCEDURE — 74011250636 HC RX REV CODE- 250/636: Performed by: HOSPITALIST

## 2018-08-27 PROCEDURE — 81001 URINALYSIS AUTO W/SCOPE: CPT | Performed by: EMERGENCY MEDICINE

## 2018-08-27 PROCEDURE — 80053 COMPREHEN METABOLIC PANEL: CPT | Performed by: EMERGENCY MEDICINE

## 2018-08-27 PROCEDURE — 74011250637 HC RX REV CODE- 250/637: Performed by: HOSPITALIST

## 2018-08-27 PROCEDURE — 65270000029 HC RM PRIVATE

## 2018-08-27 PROCEDURE — 36415 COLL VENOUS BLD VENIPUNCTURE: CPT | Performed by: EMERGENCY MEDICINE

## 2018-08-27 PROCEDURE — 65390000012 HC CONDITION CODE 44 OBSERVATION

## 2018-08-27 PROCEDURE — 65660000000 HC RM CCU STEPDOWN

## 2018-08-27 PROCEDURE — 85025 COMPLETE CBC W/AUTO DIFF WBC: CPT | Performed by: EMERGENCY MEDICINE

## 2018-08-27 PROCEDURE — 82962 GLUCOSE BLOOD TEST: CPT

## 2018-08-27 RX ORDER — SODIUM CHLORIDE 0.9 % (FLUSH) 0.9 %
5-10 SYRINGE (ML) INJECTION EVERY 8 HOURS
Status: DISCONTINUED | OUTPATIENT
Start: 2018-08-27 | End: 2018-08-31 | Stop reason: HOSPADM

## 2018-08-27 RX ORDER — PRAVASTATIN SODIUM 80 MG/1
80 TABLET ORAL DAILY
COMMUNITY
End: 2018-10-11 | Stop reason: SDUPTHER

## 2018-08-27 RX ORDER — CLOPIDOGREL BISULFATE 75 MG/1
75 TABLET ORAL DAILY
Status: DISCONTINUED | OUTPATIENT
Start: 2018-08-27 | End: 2018-08-31 | Stop reason: HOSPADM

## 2018-08-27 RX ORDER — ACETAMINOPHEN 325 MG/1
650 TABLET ORAL
Status: DISCONTINUED | OUTPATIENT
Start: 2018-08-27 | End: 2018-08-31 | Stop reason: HOSPADM

## 2018-08-27 RX ORDER — ENOXAPARIN SODIUM 100 MG/ML
40 INJECTION SUBCUTANEOUS EVERY 24 HOURS
Status: DISCONTINUED | OUTPATIENT
Start: 2018-08-27 | End: 2018-08-31 | Stop reason: HOSPADM

## 2018-08-27 RX ORDER — SODIUM CHLORIDE 0.9 % (FLUSH) 0.9 %
5-10 SYRINGE (ML) INJECTION AS NEEDED
Status: DISCONTINUED | OUTPATIENT
Start: 2018-08-27 | End: 2018-08-31 | Stop reason: HOSPADM

## 2018-08-27 RX ORDER — AMLODIPINE BESYLATE 5 MG/1
10 TABLET ORAL DAILY
Status: DISCONTINUED | OUTPATIENT
Start: 2018-08-27 | End: 2018-08-31 | Stop reason: HOSPADM

## 2018-08-27 RX ORDER — INSULIN DEGLUDEC 100 U/ML
24 INJECTION, SOLUTION SUBCUTANEOUS DAILY
Status: DISCONTINUED | OUTPATIENT
Start: 2018-08-28 | End: 2018-08-28

## 2018-08-27 RX ORDER — PRAVASTATIN SODIUM 40 MG/1
80 TABLET ORAL DAILY
Status: DISCONTINUED | OUTPATIENT
Start: 2018-08-28 | End: 2018-08-31 | Stop reason: HOSPADM

## 2018-08-27 RX ORDER — HYDROCHLOROTHIAZIDE 25 MG/1
25 TABLET ORAL DAILY
Status: DISCONTINUED | OUTPATIENT
Start: 2018-08-27 | End: 2018-08-31 | Stop reason: HOSPADM

## 2018-08-27 RX ORDER — LOSARTAN POTASSIUM 100 MG/1
100 TABLET ORAL DAILY
Status: DISCONTINUED | OUTPATIENT
Start: 2018-08-27 | End: 2018-08-31 | Stop reason: HOSPADM

## 2018-08-27 RX ORDER — AMLODIPINE BESYLATE 5 MG/1
10 TABLET ORAL DAILY
Status: DISCONTINUED | OUTPATIENT
Start: 2018-08-27 | End: 2018-08-27

## 2018-08-27 RX ORDER — DEXTROSE 50 % IN WATER (D50W) INTRAVENOUS SYRINGE
12.5-25 AS NEEDED
Status: DISCONTINUED | OUTPATIENT
Start: 2018-08-27 | End: 2018-08-31 | Stop reason: HOSPADM

## 2018-08-27 RX ORDER — POTASSIUM CHLORIDE 750 MG/1
40 TABLET, FILM COATED, EXTENDED RELEASE ORAL
Status: COMPLETED | OUTPATIENT
Start: 2018-08-27 | End: 2018-08-27

## 2018-08-27 RX ORDER — MAGNESIUM SULFATE 100 %
4 CRYSTALS MISCELLANEOUS AS NEEDED
Status: DISCONTINUED | OUTPATIENT
Start: 2018-08-27 | End: 2018-08-31 | Stop reason: HOSPADM

## 2018-08-27 RX ORDER — ONDANSETRON 2 MG/ML
4 INJECTION INTRAMUSCULAR; INTRAVENOUS
Status: DISCONTINUED | OUTPATIENT
Start: 2018-08-27 | End: 2018-08-31 | Stop reason: HOSPADM

## 2018-08-27 RX ORDER — INSULIN LISPRO 100 [IU]/ML
INJECTION, SOLUTION INTRAVENOUS; SUBCUTANEOUS
Status: DISCONTINUED | OUTPATIENT
Start: 2018-08-28 | End: 2018-08-27

## 2018-08-27 RX ORDER — BRIMONIDINE TARTRATE 2 MG/ML
1 SOLUTION/ DROPS OPHTHALMIC 2 TIMES DAILY
Status: DISCONTINUED | OUTPATIENT
Start: 2018-08-27 | End: 2018-08-31 | Stop reason: HOSPADM

## 2018-08-27 RX ORDER — FENOFIBRATE 145 MG/1
145 TABLET, COATED ORAL DAILY
Status: DISCONTINUED | OUTPATIENT
Start: 2018-08-27 | End: 2018-08-31 | Stop reason: HOSPADM

## 2018-08-27 RX ORDER — LEVOTHYROXINE SODIUM 150 UG/1
150 TABLET ORAL
Status: DISCONTINUED | OUTPATIENT
Start: 2018-08-28 | End: 2018-08-31 | Stop reason: HOSPADM

## 2018-08-27 RX ORDER — INSULIN LISPRO 100 [IU]/ML
INJECTION, SOLUTION INTRAVENOUS; SUBCUTANEOUS
Status: DISCONTINUED | OUTPATIENT
Start: 2018-08-27 | End: 2018-08-27

## 2018-08-27 RX ADMIN — Medication 10 ML: at 20:22

## 2018-08-27 RX ADMIN — LOSARTAN POTASSIUM 100 MG: 100 TABLET, FILM COATED ORAL at 13:33

## 2018-08-27 RX ADMIN — POTASSIUM CHLORIDE 40 MEQ: 750 TABLET, EXTENDED RELEASE ORAL at 22:46

## 2018-08-27 RX ADMIN — BRIMONIDINE TARTRATE 1 DROP: 2 SOLUTION OPHTHALMIC at 17:35

## 2018-08-27 RX ADMIN — INSULIN LISPRO 3 UNITS: 100 INJECTION, SOLUTION INTRAVENOUS; SUBCUTANEOUS at 17:35

## 2018-08-27 RX ADMIN — HYDROCHLOROTHIAZIDE 25 MG: 25 TABLET ORAL at 13:33

## 2018-08-27 RX ADMIN — ENOXAPARIN SODIUM 40 MG: 40 INJECTION, SOLUTION INTRAVENOUS; SUBCUTANEOUS at 13:33

## 2018-08-27 RX ADMIN — FENOFIBRATE 145 MG: 145 TABLET ORAL at 13:33

## 2018-08-27 RX ADMIN — Medication 10 ML: at 13:16

## 2018-08-27 RX ADMIN — AMLODIPINE BESYLATE 10 MG: 5 TABLET ORAL at 13:33

## 2018-08-27 RX ADMIN — CLOPIDOGREL BISULFATE 75 MG: 75 TABLET ORAL at 13:33

## 2018-08-27 NOTE — PROGRESS NOTES
Pharmacy Clarification of Prior to Admission Medication Regimen     The patient was interviewed regarding clarification of the prior to admission medication regimen. Son was present in room and obtained permission from patient to discuss drug regimen with visitor(s) present. Patient was questioned regarding use of any other inhalers, topical products, over the counter medications, herbal medications, vitamin products or ophthalmic/nasal/otic medication use. Information Obtained From: Patient, RX Query    Pertinent Pharmacy Findings: NONE    PTA medication list was corrected to the following:     Prior to Admission Medications   Prescriptions Last Dose Informant Patient Reported? Taking? amLODIPine (NORVASC) 10 mg tablet 2018 at Unknown time Self No Yes   Sig: TAKE 1 TABLET BY MOUTH EVERY MORNING   aspirin/salicylamide/caffeine (BC HEADACHE POWDER PO) 2018 at Unknown time Self Yes Yes   Sig: Take 1 Packet by mouth three (3) times daily as needed (back pain). brimonidine (ALPHAGAN) 0.15 % ophthalmic solution 2018 at Unknown time Self Yes Yes   Sig: Administer 1 Drop to both eyes two (2) times a day. clopidogrel (PLAVIX) 75 mg tab 2018 at Unknown time Self No Yes   Sig: TAKE 1 TAB BY MOUTH DAILY. fenofibrate nanocrystallized (TRICOR) 145 mg tablet 2018 at Unknown time Self No Yes   Sig: TAKE 1 TABLET BY MOUTH EVERY DAY   insulin degludec (TRESIBA FLEXTOUCH U-100) 100 unit/mL (3 mL) inpn 2018 at Unknown time Self Yes Yes   Si Units by SubCUTAneous route daily. levothyroxine (SYNTHROID) 150 mcg tablet 2018 at Unknown time Self No Yes   Sig: TAKE 1 TABLET BY MOUTH EVERY DAY   losartan-hydroCHLOROthiazide (HYZAAR) 100-25 mg per tablet 2018 at Unknown time Self No Yes   Sig: TAKE 1 TABLET BY MOUTH DAILY   pravastatin (PRAVACHOL) 80 mg tablet 2018 at Unknown time Self Yes Yes   Sig: Take 80 mg by mouth daily.       Facility-Administered Medications: None Thank you,  Roma Hawkins [facility]  Medication History Pharmacy Technician

## 2018-08-27 NOTE — IP AVS SNAPSHOT
[address] [place] [address] 
[phone] Patient: Alex Samson MRN: [id number] TREVOR:[id number] About your hospitalization You were admitted on:  August 27, 2018 You last received care in the:  [facility] 2 GENERAL SURGERY You were discharged on:  August 31, 2018 Why you were hospitalized Your primary diagnosis was:  Hypoglycemia Your diagnoses also included:  Hypothyroidism, Hypertension, Diabetes Mellitus (Hcc) Follow-up Information Follow up With Details Comments Contact Info Elvia Virk MD On 9/4/2018 1:15pm  Hospital follow-up [address] [place] [address] 
[phone] Your Scheduled Appointments Tuesday September 04, 2018  1:15 PM EDT TRANSITIONAL CARE MANAGEMENT with Elvia Virk MD  
[address] [place] [address] and [facility]) [address] [address]  
[phone] Tuesday September 18, 2018  4:15 PM EDT Any with Elvia Virk MD  
[address] [place] [address] and [facility]) [address] [address]  
[phone] Thursday October 11, 2018  4:30 PM EDT Any with Elvia Virk MD  
[address] [place] [address] and [facility]) [address] [address]  
[phone] Discharge Orders None A check mike indicates which time of day the medication should be taken. My Medications START taking these medications Instructions Each Dose to Equal  
 Morning Noon Evening Bedtime  
 insulin detemir U-100 100 unit/mL (3 mL) Inpn Commonly known as:  Jackalyn Salt Your last dose was: Your next dose is:    
   
   
 30 units every morning3 CHANGE how you take these medications Instructions Each Dose to Equal  
 Morning Noon Evening Bedtime  
 pravastatin 80 mg tablet Commonly known as:  PRAVACHOL What changed:  Another medication with the same name was removed. Continue taking this medication, and follow the directions you see here. Your last dose was: Your next dose is: Take 80 mg by mouth daily. 80 mg CONTINUE taking these medications Instructions Each Dose to Equal  
 Morning Noon Evening Bedtime  
 amLODIPine 10 mg tablet Commonly known as:  Varinder Haven Your last dose was: Your next dose is: TAKE 1 TABLET BY MOUTH EVERY MORNING  
     
   
   
   
  
 BC HEADACHE POWDER PO Your last dose was: Your next dose is: Take 1 Packet by mouth three (3) times daily as needed (back pain). 1 Packet  
    
   
   
   
  
 brimonidine 0.15 % ophthalmic solution Commonly known as:  Aggie Escobedo Your last dose was: Your next dose is:    
   
   
 Administer 1 Drop to both eyes two (2) times a day. 1 Drop  
    
   
   
   
  
 clopidogrel 75 mg Tab Commonly known as:  PLAVIX Your last dose was: Your next dose is: TAKE 1 TAB BY MOUTH DAILY. fenofibrate nanocrystallized 145 mg tablet Commonly known as:  [employer] Your last dose was: Your next dose is: TAKE 1 TABLET BY MOUTH EVERY DAY  
     
   
   
   
  
 levothyroxine 150 mcg tablet Commonly known as:  SYNTHROID Your last dose was: Your next dose is: TAKE 1 TABLET BY MOUTH EVERY DAY  
     
   
   
   
  
 losartan-hydroCHLOROthiazide 100-25 mg per tablet Commonly known as:  HYZAAR Your last dose was: Your next dose is: TAKE 1 TABLET BY MOUTH DAILY  
     
   
   
   
  
  
STOP taking these medications TRESIBA FLEXTOUCH U-100 100 unit/mL (3 mL) Inpn Generic drug:  insulin degludec Where to Get Your Medications These medications were sent to [address] [place] [address] [address] [place] [address] Phone:  [phone]  
  insulin detemir U-100 100 unit/mL (3 mL) Inpn Discharge Instructions General Discharge Instructions Patient ID: 
Philippe Lyons [id number] 
89 y.o. 
1940 Patient Instructions The following personal items were collected during your admission and were returned to you. Take Home Medications What to do at [facility] Recommended diet: Diabetic Diet Recommended activity: Activity as tolerated Follow-up with Mitchell Lyles MD  in 4 days. You must check blood sugars before every meal and at bedtime and write the values down. It is important that you eat all of your meals at the same time every day including your bedtime snack. Information obtained by : 
I understand that if any problems occur once I am at home I am to contact my physician. I understand and acknowledge receipt of the instructions indicated above. Physician's or R.N.'s Signature                                                                  Date/Time Patient or Representative Signature                                                          Date/Time Introducing [facility]! [employer] introduces [employer] patient portal. Now you can access parts of your medical record, email your doctor's office, and request medication refills online. 1. In your internet browser, go to [email or web address] 2. Click on the First Time User? Click Here link in the Sign In box. You will see the New Member Sign Up page. 3. Enter your [employer] Access Code exactly as it appears below. You will not need to use this code after youve completed the sign-up process. If you do not sign up before the expiration date, you must request a new code. · [employer] Access Code: [email or web address] Expires: 10/8/2018  5:25 PM 
 
4. Enter the last four digits of your Social Security Number (xxxx) and Date of Birth (mm/dd/yyyy) as indicated and click Submit. You will be taken to the next sign-up page. 5. Create a [employer] ID. This will be your [employer] login ID and cannot be changed, so think of one that is secure and easy to remember. 6. Create a [employer] password. You can change your password at any time. 7. Enter your Password Reset Question and Answer. This can be used at a later time if you forget your password. 8. Enter your e-mail address. You will receive e-mail notification when new information is available in [facility] [address] [address]. 9. Click Sign Up. You can now view and download portions of your medical record. 10. Click the Download Summary menu link to download a portable copy of your medical information. If you have questions, please visit the Frequently Asked Questions section of the [employer] website. Remember, [employer] is NOT to be used for urgent needs. For medical emergencies, dial 911. Now available from your iPhone and Android! Introducing Marcellus Dodd As a [employer] patient, I wanted to make you aware of our electronic visit tool called Marcellus Romanlucsera. [employer] 24/7 allows you to connect within minutes with a medical provider 24 hours a day, seven days a week via a mobile device or tablet or logging into a secure website from your computer. You can access Marcellus Dodd from anywhere in the [place].  
 
A virtual visit might be right for you when you have a simple condition and feel like you just dont want to get out of bed, or cant get away from work for an appointment, when your regular [facility] provider is not available (evenings, weekends or holidays), or when youre out of town and need minor care. Electronic visits cost only $49 and if the [facility] 24/7 provider determines a prescription is needed to treat your condition, one can be electronically transmitted to a nearby pharmacy*. Please take a moment to enroll today if you have not already done so. The enrollment process is free and takes just a few minutes. To enroll, please download the [employer] 24/7 rosario to your tablet or phone, or visit [email or web address] to enroll on your computer. And, as an [address] [place] [address] patient with a [employer] account, the results of your visits will be scanned into your electronic medical record and your primary care provider will be able to view the scanned results. We urge you to continue to see your regular [facility] provider for your ongoing medical care. And while your primary care provider may not be the one available when you seek a [employer] virtual visit, the peace of mind you get from getting a real diagnosis real time can be priceless. For more information on [employer], view our Frequently Asked Questions (FAQs) at [email or web address]. Sincerely, 
 
Basia Sharma MD 
Chief Medical Officer [address] *:  certain medications cannot be prescribed via [employer] Providers Seen During Your Hospitalization Provider Specialty Primary office phone Rebecca Romero MD Emergency Medicine [phone] Glendy Lucero MD Internal Medicine [phone] Your Primary Care Physician (PCP) Primary Care Physician Office Phone Office Fax [facility] [phone] You are allergic to the following No active allergies Recent Documentation Weight BMI OB Status Smoking Status 68 kg 26.57 kg/m2 Hysterectomy Never Smoker Emergency Contacts Name Discharge Info Relation Home Work Mobile Gretchen Royal DISCHARGE CAREGIVER [3] Child [2] [phone] [facility] DISCHARGE CAREGIVER [3] Child [2] [phone] Patient Belongings The following personal items are in your possession at time of discharge: 
  Dental Appliances: None  Visual Aid: At bedside, Glasses      Home Medications: None   Jewelry: Ring  Clothing: None    Other Valuables: None Please provide this summary of care documentation to your next provider. Signatures-by signing, you are acknowledging that this After Visit Summary has been reviewed with you and you have received a copy. Patient Signature:  ____________________________________________________________ Date:  ____________________________________________________________  
  
Eleno Lyn Provider Signature:  ____________________________________________________________ Date:  ____________________________________________________________

## 2018-08-27 NOTE — ED TRIAGE NOTES
Pt. Presents to ED today for complaints of hypoglycemia. Patient has been seen numerous times in the past month for the same problem. Patient and family report that patient is forgetting to eat at night. Pt. Given D50 en route. Upon arrival patient . Pt. Placed in position of comfort with call cha in reach. Dr. Kayden Swenson at bedside to evaluate patient.

## 2018-08-27 NOTE — ED NOTES
Pt. Assisted to [facility] at this time. PT. Tolerated well. Pt. Placed back in position of comfort with call bell in reach.

## 2018-08-27 NOTE — ED PROVIDER NOTES
EMERGENCY DEPARTMENT HISTORY AND PHYSICAL EXAM      Date: 8/27/2018  Patient Name: Good Mobley    History of Presenting Illness     Chief Complaint   Patient presents with    Low Blood Sugar     Patient with multiple episodes of hypoglycemia after not eating at night; sugar 37 for EMS        History Provided By: Patient and EMS    HPI: Good Mobley, 69 y.o. female with PMHx significant for DM, stroke, HTN, thyroid disease, presents via EMS to the ED with cc of hypoglycemia. Pt states her son called EMS. Per EMS, pt's blood sugar was 37 on arrival. They endorse giving pt D50 en route. Upon arrival, pt's blood sugar was 187. Pt states she does not remember EMS arriving at her home. She does not remember if she ate dinner last night. Pt endorses taking only one dose of insulin yesterday, 24 units in the morning as prescribed. She states that her insulin dose has not been changed despite several ED visits for same complaint because of her A1C levels. Pt denies taking additional pills to manage her DM. She denies any pain. Pt denies N/V/D, cough, congestion, or HA. Given pt is currently without complaints, there is no applicable location, quality, severity, or modifying factors. There are no other complaints, changes, or physical findings at this time.     PCP: Hyacinth Hammans, MD    Current Facility-Administered Medications   Medication Dose Route Frequency Provider Last Rate Last Dose    [START ON 8/28/2018] insulin degludec inpn 24 Units  24 Units SubCUTAneous DAILY Miki Jolley MD        sodium chloride (NS) flush 5-10 mL  5-10 mL IntraVENous Trisha Garcia MD        sodium chloride (NS) flush 5-10 mL  5-10 mL IntraVENous PRN Miki Jolley MD        acetaminophen (TYLENOL) tablet 650 mg  650 mg Oral Q6H PRN Miki Jolley MD        ondansetron [facility]) injection 4 mg  4 mg IntraVENous Q6H PRN Miki Jolley MD        enoxaparin (LOVENOX) injection 40 mg  40 mg SubCUTAneous Q24H Miki Jolley MD  insulin lispro (HUMALOG) injection   SubCUTAneous AC&HS Mindy Moseley MD        glucose chewable tablet 16 g  4 Tab Oral PRN Mindy Moseley MD        dextrose (D50W) injection syrg 12.5-25 g  12.5-25 g IntraVENous PRN Mindy Moseley MD        glucagon [facility]) injection 1 mg  1 mg IntraMUSCular PRN Mindy Moseley MD        [START ON 8/28/2018] pravastatin (PRAVACHOL) tablet 80 mg  80 mg Oral DAILY Mindy Moseley MD        clopidogrel (PLAVIX) tablet 75 mg  75 mg Oral DAILY Mindy Moseley MD        fenofibrate nanocrystallized (TRICOR) tablet 145 mg  145 mg Oral DAILY MD Carlin Martinez ON 8/28/2018] levothyroxine (SYNTHROID) tablet 150 mcg  150 mcg Oral 6am Mindy Moseley MD        . PHARMACY TO SUBSTITUTE PER PROTOCOL (Reordered from: losartan-hydroCHLOROthiazide (HYZAAR) 100-25 mg per tablet)    Per Protocol Mindy Moseley MD        . PHARMACY TO SUBSTITUTE PER PROTOCOL (Reordered from: brimonidine (ALPHAGAN) 0.15 % ophthalmic solution)    Per Protocol Mindy Moseley MD         Current Outpatient Prescriptions   Medication Sig Dispense Refill    pravastatin (PRAVACHOL) 80 mg tablet Take 80 mg by mouth daily.  aspirin/salicylamide/caffeine (BC HEADACHE POWDER PO) Take 1 Packet by mouth three (3) times daily as needed (back pain).  insulin degludec (TRESIBA FLEXTOUCH U-100) 100 unit/mL (3 mL) inpn 24 Units by SubCUTAneous route daily.  fenofibrate nanocrystallized (TRICOR) 145 mg tablet TAKE 1 TABLET BY MOUTH EVERY DAY 30 Tab 11    levothyroxine (SYNTHROID) 150 mcg tablet TAKE 1 TABLET BY MOUTH EVERY DAY 30 Tab 11    clopidogrel (PLAVIX) 75 mg tab TAKE 1 TAB BY MOUTH DAILY. 30 Tab 11    amLODIPine (NORVASC) 10 mg tablet TAKE 1 TABLET BY MOUTH EVERY MORNING 90 Tab 3    losartan-hydroCHLOROthiazide (HYZAAR) 100-25 mg per tablet TAKE 1 TABLET BY MOUTH DAILY 30 Tab 11    brimonidine (ALPHAGAN) 0.15 % ophthalmic solution Administer 1 Drop to both eyes two (2) times a day. Past History     Past Medical History:  Past Medical History:   Diagnosis Date    Diabetes [facility]     Heart failure ([facility]     unknown to family    Hypercholesteremia     Hypertension     Stroke ([facility]     Thyroid disease        Past Surgical History:  Past Surgical History:   Procedure Laterality Date    HX GYN      HX HEENT      thyroidectomy    HX HYSTERECTOMY      REMOVAL GALLBLADDER      THYROIDECTOMY         Family History:  Family History   Problem Relation Age of Onset    Diabetes Father     No Known Problems Mother        Social History:  Social History   Substance Use Topics    Smoking status: Never Smoker    Smokeless tobacco: Never Used    Alcohol use No      Comment: been years       Allergies:  No Known Allergies      Review of Systems   Review of Systems   Constitutional: Negative for activity change, appetite change, fatigue and fever. HENT: Negative. Negative for congestion, rhinorrhea and sore throat. Respiratory: Negative. Negative for cough, shortness of breath and wheezing. Cardiovascular: Negative. Negative for chest pain and leg swelling. Gastrointestinal: Negative. Negative for abdominal distention, abdominal pain, constipation, diarrhea, nausea and vomiting. Endocrine: Negative. Genitourinary: Negative for difficulty urinating, dysuria, menstrual problem, vaginal bleeding and vaginal discharge. Musculoskeletal: Negative. Negative for arthralgias, joint swelling and myalgias. Skin: Negative. Negative for rash. Neurological: Negative. Negative for dizziness, weakness, light-headedness and headaches. Psychiatric/Behavioral: Negative. Physical Exam   Physical Exam   Constitutional: She is oriented to person, place, and time. She appears well-developed and well-nourished. HENT:   Head: Atraumatic. Eyes: EOM are normal.   Cardiovascular: Normal rate, regular rhythm, normal heart sounds and intact distal pulses.   Exam reveals no gallop and no friction rub. No murmur heard. Pulmonary/Chest: Effort normal and breath sounds normal. No respiratory distress. She has no wheezes. She has no rales. She exhibits no tenderness. Abdominal: Soft. Bowel sounds are normal. She exhibits no distension and no mass. There is no tenderness. There is no rebound and no guarding. Musculoskeletal: Normal range of motion. She exhibits no edema or tenderness. Neurological: She is oriented to person, place, and time. Skin: Skin is warm. Psychiatric: She has a normal mood and affect. Nursing note and vitals reviewed. Diagnostic Study Results     Labs -     Recent Results (from the past 12 hour(s))   GLUCOSE, POC    Collection Time: 08/27/18  7:28 AM   Result Value Ref Range    Glucose (POC) 182 (H) 65 - 100 mg/dL    Performed by Marisa Rojas    CBC WITH AUTOMATED DIFF    Collection Time: 08/27/18  7:49 AM   Result Value Ref Range    WBC 3.5 (L) 3.6 - 11.0 K/uL    RBC 3.76 (L) 3.80 - 5.20 M/uL    HGB 12.1 11.5 - 16.0 g/dL    HCT 34.9 (L) 35.0 - 47.0 %    MCV 92.8 80.0 - 99.0 FL    MCH 32.2 26.0 - 34.0 PG    MCHC 34.7 30.0 - 36.5 g/dL    RDW 13.1 11.5 - 14.5 %    PLATELET 904 065 - 474 K/uL    MPV 10.1 8.9 - 12.9 FL    NRBC 0.0 0  WBC    ABSOLUTE NRBC 0.00 0.00 - 0.01 K/uL    NEUTROPHILS 68 32 - 75 %    LYMPHOCYTES 20 12 - 49 %    MONOCYTES 10 5 - 13 %    EOSINOPHILS 2 0 - 7 %    BASOPHILS 0 0 - 1 %    IMMATURE GRANULOCYTES 0 0.0 - 0.5 %    ABS. NEUTROPHILS 2.3 1.8 - 8.0 K/UL    ABS. LYMPHOCYTES 0.7 (L) 0.8 - 3.5 K/UL    ABS. MONOCYTES 0.4 0.0 - 1.0 K/UL    ABS. EOSINOPHILS 0.1 0.0 - 0.4 K/UL    ABS. BASOPHILS 0.0 0.0 - 0.1 K/UL    ABS. IMM.  GRANS. 0.0 0.00 - 0.04 K/UL    DF AUTOMATED      RBC COMMENTS NORMOCYTIC, NORMOCHROMIC     METABOLIC PANEL, COMPREHENSIVE    Collection Time: 08/27/18  7:49 AM   Result Value Ref Range    Sodium 141 136 - 145 mmol/L    Potassium 3.3 (L) 3.5 - 5.1 mmol/L    Chloride 106 97 - 108 mmol/L    CO2 26 21 - 32 mmol/L Anion gap 9 5 - 15 mmol/L    Glucose 155 (H) 65 - 100 mg/dL    BUN 16 6 - 20 MG/DL    Creatinine 0.67 0.55 - 1.02 MG/DL    BUN/Creatinine ratio 24 (H) 12 - 20      GFR est AA >60 >60 ml/min/1.73m2    GFR est non-AA >60 >60 ml/min/1.73m2    Calcium 8.2 (L) 8.5 - 10.1 MG/DL    Bilirubin, total 0.3 0.2 - 1.0 MG/DL    ALT (SGPT) 22 12 - 78 U/L    AST (SGOT) 19 15 - 37 U/L    Alk. phosphatase 70 45 - 117 U/L    Protein, total 6.4 6.4 - 8.2 g/dL    Albumin 3.3 (L) 3.5 - 5.0 g/dL    Globulin 3.1 2.0 - 4.0 g/dL    A-G Ratio 1.1 1.1 - 2.2     URINALYSIS W/ REFLEX CULTURE    Collection Time: 08/27/18  8:15 AM   Result Value Ref Range    Color YELLOW/STRAW      Appearance CLEAR CLEAR      Specific gravity 1.026 1.003 - 1.030      pH (UA) 6.0 5.0 - 8.0      Protein NEGATIVE  NEG mg/dL    Glucose >1000 (A) NEG mg/dL    Ketone NEGATIVE  NEG mg/dL    Bilirubin NEGATIVE  NEG      Blood NEGATIVE  NEG      Urobilinogen 1.0 0.2 - 1.0 EU/dL    Nitrites NEGATIVE  NEG      Leukocyte Esterase NEGATIVE  NEG      WBC 0-4 0 - 4 /hpf    RBC 0-5 0 - 5 /hpf    Epithelial cells FEW FEW /lpf    Bacteria NEGATIVE  NEG /hpf    UA:UC IF INDICATED CULTURE NOT INDICATED BY UA RESULT CNI      Hyaline cast 2-5 0 - 5 /lpf   GLUCOSE, POC    Collection Time: 08/27/18  8:31 AM   Result Value Ref Range    Glucose (POC) 127 (H) 65 - 100 mg/dL    Performed by Sam Walters, POC    Collection Time: 08/27/18 10:00 AM   Result Value Ref Range    Glucose (POC) 110 (H) 65 - 100 mg/dL    Performed by Wyatt Weir          Medical Decision Making   I am the first provider for this patient. I reviewed the vital signs, available nursing notes, past medical history, past surgical history, family history and social history. Vital Signs-Reviewed the patient's vital signs.   Patient Vitals for the past 12 hrs:   Temp Pulse Resp BP SpO2   08/27/18 1215 - - - 133/57 98 %   08/27/18 1200 - - - 144/59 97 %   08/27/18 1115 - - - 143/58 98 %   08/27/18 1045 - - - 142/67 97 %   08/27/18 1000 - - - 150/55 99 %   08/27/18 0930 - - - 140/60 99 %   08/27/18 0901 - - - 152/68 100 %   08/27/18 0831 - - - 135/86 98 %   08/27/18 0745 - - - 160/67 98 %   08/27/18 0727 98.1 °F (36.7 °C) 81 16 160/82 99 %         Records Reviewed: Nursing Notes and Old Medical Records    Provider Notes (Medical Decision Making):   Recurrent episode of hypoglycemia likely due to diet vs medication. Will need to involve primar care in managing pt as she has had multiple visits for same complaint. ED Course:   Initial assessment performed. The patients presenting problems have been discussed, and they are in agreement with the care plan formulated and outlined with them. I have encouraged them to ask questions as they arise throughout their visit. CRITICAL CARE NOTE :    IMPENDING DETERIORATION -Metabolic    ASSOCIATED RISK FACTORS - Metabolic changes    MANAGEMENT- Bedside Assessment and Supervision of Care    INTERPRETATION -  Xrays, ECG and Blood Pressure    INTERVENTIONS - Metobolic interventions    CASE REVIEW - Hospitalist, Nursing, Family and PCP    TREATMENT RESPONSE -Improved    PERFORMED BY - Self        NOTES   :      I have spent 125 minutes of critical care time involved in lab review, consultations with specialist, family decision- making, bedside attention and documentation. During this entire length of time I was immediately available to the patient . Vickey Lynn MD    7:40 AM  Chart review confirms pt's insulin was not increased due to her A1C levels. Pt is supposed to eat every 4 hours but often forgets. Consult Note:  10:29 AM  Vickey Lynn MD spoke with Jeni Hamilton MD  Specialty: PCP  Discussed pt's hx, disposition, and available diagnostic and imaging results. Reviewed care plans. Dr. Renaldo Ambrosio will admit pt at this point. He will see her tomorrow.  Dr. Renaldo Ambrosio wants to see if sugar levels are labile here to determine if pt needs family involvement or to be moved into a nursing facility. Consult Note:  10:58 AM  Иван Nur MD spoke with Ashley Sage MD  Specialty: Hospitalist  Discussed pt's hx, disposition, and available diagnostic and imaging results. Reviewed care plans. Dr. Raz Costa will admit pt.     10:52 AM   Per registration, both son and daughter are in contact regarding picking up pts special needs son who is here in the waiting room. Pt was informed of this. Disposition:  Admit Note:  10:58 AM  Pt is being admitted by Dr. Raz Costa. The results of their tests and reason(s) for their admission have been discussed with pt and/or available family. They convey agreement and understanding for the need to be admitted and for admission diagnosis. Diagnosis     Clinical Impression:   1. Hypoglycemia        Attestations: This note is prepared by Ravinder Young, acting as a Scribe for MD Иван Callaway MD: The scribe's documentation has been prepared under my direction and personally reviewed by me in its entirety. I confirm that the notes above accurately reflects all work, treatment, procedures, and medical decision making performed by me.

## 2018-08-27 NOTE — DIABETES MGMT
D[facility] Consult Note    Recommendations/ Comments: Noted pt's basal insulin ordered. Consider Tresiba at 60 percent of home dose. Current hospital DM medication: lispro correction high sensitivity, Tresiba 24 units ordered for tomorrow am (pt supplied - confirmed with pt's RN at 1623 hrs today)    Consult received for:  [x]             Assessment of home management                []      Medication Recommendations                []             Meter/monitoring     []             Insulin instruction     []             New diagnosis     []             Outpatient education     []             Insulin pump patient     []             Insulin infusion     []             DKA/HHS    Chart reviewed and initial evaluation complete on Deana Chase. Patient is a 69 y.o. female with known DM on Tresiba 24 units in am at home. Pt shared that she injects her insulin in her abdomen and front hip area near crease of leg. Pt reported that she checks her BG 3x/week and that her BG are 50-60s. Initially pt reported that her BG are always low (below 70 mg/dL) but then reported that she only has low BG every couple of months. Educator asked what her BG have been in the last week and pt shared it has been 50-60 mg/dL range , pt had difficulty recalling BG patterns in the last week/ in general. Pt has been treating low BG with a glass of orange with 1 tbls sugar added. Overall, pt seemed to have difficulty providing information regarding DM management as she seemed to have trouble remembering what she does at home. Pt shared that her appetite is poor and that she doesn't eat, reported that she eats 3 meals daily which may consist of plata and eggs or oatmeal or cereal. Pt shared that her son bought her Glucerna shakes to drink.          Assessed and instructed patient on the following:   ·  blood sugar goals - discussed target for BG and hgb A1c, discussed current hgb A1c is elevated and risks with elevated hgb A1c, hypoglycemia prevention and treatment - discussed not adding sugary to orange juice and how to treat low BG using rule of 15, illness management - discussed importance of achieving target BG and dangers with both high and low BG, educator discussed that pt may need adjustments to insulin to avoid hypoglycemia as reported frequency of hypoglycemia is high, educator stressed to pt checking BG more often given pt's hgb A1c is so elevated but pt is having frequent episodes of hypoglycemia (question if pt is remember, educator stressed imjpo, nutrition, referred to Diabetes Educator and site rotation    Encouraged the following:   · dietary modifications: avoid skipped meals - try drinking glucerna shake if having difficulty eating a meal, discussed aiming to have protein + starch + vegetable at all meals if able (discussed MyPlate method but simplified it to Protein+starch + vegetable at meals as pt seemed to get confused with this), Educator discussed some snack ideas for pt to have to replace skipped meal as well, regular blood sugar monitoring:  Increase to 3x/daily before meals, pt to call PCP if BG above or below target    Provided patient with the following: [x]             Survival skills education materials               []             Insulin education materials               []             CHO counting education materials               [x]             Outpatient DTC contact number               []             Glucometer                   Discussed with patient and/or family need for follow up appointment for diabetes management after discharge. Pt declined to set outpt education appointment with DTC as she prefers to call after discharge to set up appointment.     A1c:   Lab Results   Component Value Date/Time    Hemoglobin A1c 13.4 (H) 07/10/2018 05:14 PM       Recent Glucose Results: Lab Results   Component Value Date/Time     (H) 08/27/2018 07:49 AM    GLUCPOC 110 (H) 08/27/2018 10:00 AM    GLUCPOC 127 (H) 08/27/2018 08:31 AM    GLUCPOC 182 (H) 08/27/2018 07:28 AM        Lab Results   Component Value Date/Time    Creatinine 0.67 08/27/2018 07:49 AM     Estimated Creatinine Clearance: 66.1 mL/min (based on Cr of 0.67). Active Orders   Diet    DIET DIABETIC CONSISTENT CARB Regular        PO intake: Patient Vitals for the past 72 hrs:   % Diet Eaten   08/27/18 1537 100 %       Will continue to follow as needed. Thank you.          Padilla Laughlin, [address]    Office: [phone]

## 2018-08-27 NOTE — PROGRESS NOTES
Problem: Falls - Risk of  Goal: *Absence of Falls  Document Tyler Fall Risk and appropriate interventions in the flowsheet.    Outcome: Progressing Towards Goal  Fall Risk Interventions:            Medication Interventions: Teach patient to arise slowly

## 2018-08-27 NOTE — ED NOTES
TRANSFER - OUT REPORT:    Verbal report given to John(name) on Onesimo Raymond  being transferred to General surgery(unit) for routine progression of care       Report consisted of patients Situation, Background, Assessment and   Recommendations(SBAR). Information from the following report(s) SBAR, Kardex, ED Summary, Procedure Summary, Intake/Output, MAR and Recent Results was reviewed with the receiving nurse. Lines:   Peripheral IV 08/27/18 Right Antecubital (Active)   Site Assessment Clean, dry, & intact 8/27/2018  7:45 AM   Phlebitis Assessment 0 8/27/2018  7:45 AM   Infiltration Assessment 0 8/27/2018  7:45 AM   Dressing Status Clean, dry, & intact 8/27/2018  7:45 AM   Dressing Type Tape;Transparent 8/27/2018  7:45 AM   Hub Color/Line Status Pink 8/27/2018  7:45 AM        Opportunity for questions and clarification was provided.

## 2018-08-27 NOTE — IP AVS SNAPSHOT
[address] [facility] 
[phone] Patient: Rome Inman MRN: [id number] OAF:[id number] A check mike indicates which time of day the medication should be taken. My Medications START taking these medications Instructions Each Dose to Equal  
 Morning Noon Evening Bedtime  
 insulin detemir U-100 100 unit/mL (3 mL) Inpn Commonly known as:  Mamadou King Your last dose was: Your next dose is:    
   
   
 30 units every morning3 CHANGE how you take these medications Instructions Each Dose to Equal  
 Morning Noon Evening Bedtime  
 pravastatin 80 mg tablet Commonly known as:  PRAVACHOL What changed:  Another medication with the same name was removed. Continue taking this medication, and follow the directions you see here. Your last dose was: Your next dose is: Take 80 mg by mouth daily. 80 mg CONTINUE taking these medications Instructions Each Dose to Equal  
 Morning Noon Evening Bedtime  
 amLODIPine 10 mg tablet Commonly known as:  Padilla Markel Your last dose was: Your next dose is: TAKE 1 TABLET BY MOUTH EVERY MORNING  
     
   
   
   
  
 BC HEADACHE POWDER PO Your last dose was: Your next dose is: Take 1 Packet by mouth three (3) times daily as needed (back pain). 1 Packet  
    
   
   
   
  
 brimonidine 0.15 % ophthalmic solution Commonly known as:  Lenon Pain Your last dose was: Your next dose is:    
   
   
 Administer 1 Drop to both eyes two (2) times a day. 1 Drop  
    
   
   
   
  
 clopidogrel 75 mg Tab Commonly known as:  PLAVIX Your last dose was: Your next dose is: TAKE 1 TAB BY MOUTH DAILY. fenofibrate nanocrystallized 145 mg tablet Commonly known as:  [employer] Your last dose was: Your next dose is: TAKE 1 TABLET BY MOUTH EVERY DAY  
     
   
   
   
  
 levothyroxine 150 mcg tablet Commonly known as:  SYNTHROID Your last dose was: Your next dose is: TAKE 1 TABLET BY MOUTH EVERY DAY  
     
   
   
   
  
 losartan-hydroCHLOROthiazide 100-25 mg per tablet Commonly known as:  HYZAAR Your last dose was: Your next dose is: TAKE 1 TABLET BY MOUTH DAILY  
     
   
   
   
  
  
STOP taking these medications TRESIBA FLEXTOUCH U-100 100 unit/mL (3 mL) Inpn Generic drug:  insulin degludec Where to Get Your Medications These medications were sent to [address] [place] [address], [address] [place] [address] Phone:  [phone]  
  insulin detemir U-100 100 unit/mL (3 mL) In

## 2018-08-28 ENCOUNTER — APPOINTMENT (OUTPATIENT)
Dept: CT IMAGING | Age: 78
End: 2018-08-28
Attending: INTERNAL MEDICINE
Payer: MEDICARE

## 2018-08-28 LAB
EST. AVERAGE GLUCOSE BLD GHB EST-MCNC: 266 MG/DL
GLUCOSE BLD STRIP.AUTO-MCNC: 113 MG/DL (ref 65–100)
GLUCOSE BLD STRIP.AUTO-MCNC: 233 MG/DL (ref 65–100)
GLUCOSE BLD STRIP.AUTO-MCNC: 247 MG/DL (ref 65–100)
GLUCOSE BLD STRIP.AUTO-MCNC: 396 MG/DL (ref 65–100)
GLUCOSE BLD STRIP.AUTO-MCNC: 452 MG/DL (ref 65–100)
HBA1C MFR BLD: 10.9 % (ref 4.2–6.3)
SERVICE CMNT-IMP: ABNORMAL
TSH SERPL DL<=0.05 MIU/L-ACNC: 0.96 UIU/ML (ref 0.36–3.74)

## 2018-08-28 PROCEDURE — 82962 GLUCOSE BLOOD TEST: CPT

## 2018-08-28 PROCEDURE — 74011250637 HC RX REV CODE- 250/637: Performed by: HOSPITALIST

## 2018-08-28 PROCEDURE — 74011636637 HC RX REV CODE- 636/637: Performed by: INTERNAL MEDICINE

## 2018-08-28 PROCEDURE — 84443 ASSAY THYROID STIM HORMONE: CPT | Performed by: INTERNAL MEDICINE

## 2018-08-28 PROCEDURE — 99218 HC RM OBSERVATION: CPT

## 2018-08-28 PROCEDURE — 36415 COLL VENOUS BLD VENIPUNCTURE: CPT | Performed by: HOSPITALIST

## 2018-08-28 PROCEDURE — 70450 CT HEAD/BRAIN W/O DYE: CPT

## 2018-08-28 PROCEDURE — 74011250636 HC RX REV CODE- 250/636: Performed by: HOSPITALIST

## 2018-08-28 PROCEDURE — 65390000012 HC CONDITION CODE 44 OBSERVATION

## 2018-08-28 PROCEDURE — 83036 HEMOGLOBIN GLYCOSYLATED A1C: CPT | Performed by: HOSPITALIST

## 2018-08-28 PROCEDURE — 96372 THER/PROPH/DIAG INJ SC/IM: CPT

## 2018-08-28 RX ORDER — INSULIN LISPRO 100 [IU]/ML
12 INJECTION, SOLUTION INTRAVENOUS; SUBCUTANEOUS ONCE
Status: COMPLETED | OUTPATIENT
Start: 2018-08-28 | End: 2018-08-28

## 2018-08-28 RX ORDER — INSULIN DEGLUDEC 100 U/ML
30 INJECTION, SOLUTION SUBCUTANEOUS DAILY
Status: DISCONTINUED | OUTPATIENT
Start: 2018-08-29 | End: 2018-08-31

## 2018-08-28 RX ADMIN — ENOXAPARIN SODIUM 40 MG: 40 INJECTION, SOLUTION INTRAVENOUS; SUBCUTANEOUS at 13:35

## 2018-08-28 RX ADMIN — LOSARTAN POTASSIUM 100 MG: 100 TABLET, FILM COATED ORAL at 08:28

## 2018-08-28 RX ADMIN — CLOPIDOGREL BISULFATE 75 MG: 75 TABLET ORAL at 08:28

## 2018-08-28 RX ADMIN — FENOFIBRATE 145 MG: 145 TABLET ORAL at 08:28

## 2018-08-28 RX ADMIN — INSULIN DEGLUDEC 24 UNITS: 100 INJECTION, SOLUTION SUBCUTANEOUS at 08:36

## 2018-08-28 RX ADMIN — BRIMONIDINE TARTRATE 1 DROP: 2 SOLUTION OPHTHALMIC at 08:30

## 2018-08-28 RX ADMIN — Medication 10 ML: at 05:07

## 2018-08-28 RX ADMIN — BRIMONIDINE TARTRATE 1 DROP: 2 SOLUTION OPHTHALMIC at 17:28

## 2018-08-28 RX ADMIN — PRAVASTATIN SODIUM 80 MG: 40 TABLET ORAL at 08:28

## 2018-08-28 RX ADMIN — Medication 10 ML: at 21:12

## 2018-08-28 RX ADMIN — INSULIN LISPRO 12 UNITS: 100 INJECTION, SOLUTION INTRAVENOUS; SUBCUTANEOUS at 17:23

## 2018-08-28 RX ADMIN — INSULIN DEGLUDEC 6 UNITS: 100 INJECTION, SOLUTION SUBCUTANEOUS at 09:05

## 2018-08-28 RX ADMIN — LEVOTHYROXINE SODIUM 150 MCG: 150 TABLET ORAL at 05:06

## 2018-08-28 RX ADMIN — HYDROCHLOROTHIAZIDE 25 MG: 25 TABLET ORAL at 08:28

## 2018-08-28 RX ADMIN — Medication 10 ML: at 13:35

## 2018-08-28 RX ADMIN — AMLODIPINE BESYLATE 10 MG: 5 TABLET ORAL at 08:28

## 2018-08-28 NOTE — PROGRESS NOTES
Reason for Admission:   Hypogycemia RRAT Score:   9 Plan for utilizing home health:   TBD Likelihood of Readmission:  Low to Moderate Transition of Care Plan:    Patient is independent with ADL's but appears to have some memory deficit as documented in  2015. Patient states that she has stopped driving in the past year and that she depends on family for transportation. Patient denies past HH/Rehab and DME use. Patient does not have any needs or concerns at this time. CM will continue to follow. NN contacted. PCP- Dr Yony Aguilar Pharmacy- [facility] 
 
Care Management Interventions PCP Verified by CM: Yes (Dr Yony Aguilar) Mode of Transport at Discharge: Other (see comment) (Family) Transition of Care Consult (CM Consult): Discharge Planning Discharge Durable Medical Equipment: No (no DME use) Physical Therapy Consult: No 
Occupational Therapy Consult: No 
Speech Therapy Consult: No 
Current Support Network: Own Home (Two of patients son's live with patient in a three story home with 4 steps to enter the home) Confirm Follow Up Transport: Family Plan discussed with Pt/Family/Caregiver: Yes Discharge Location Discharge Placement: Home Yaquelin Bess Ext [phone]

## 2018-08-28 NOTE — PROGRESS NOTES
Bedside shift change report given to Daniel (oncoming nurse) and Ricci Thomson (student nurse) by  Shoryt Garcia nurse). Report included the following information SBAR, Kardex, Recent Results and Cardiac Rhythm (NSR). 1000: Pt down to CT with transport 1045: Pt back in room, spiritual care at bedside.

## 2018-08-28 NOTE — PROGRESS NOTES
Results for Federico Lopez (MRN [id number]) as of 8/28/2018 16:07 Ref. Range 8/28/2018 07:15 8/28/2018 11:05 8/28/2018 15:57 GLUCOSE,FAST - POC Latest Ref Range: 65 - 100 mg/dL 247 (H) 396 (H) 452 (H) Contacted MD Vickey Laureano To notify how pt's glucose has been today. Order one time SQ Humalog insulin. Will continue to monitor

## 2018-08-28 NOTE — PROGRESS NOTES
Problem: Falls - Risk of 
Goal: *Absence of Falls Document Margarita Stoddardons Fall Risk and appropriate interventions in the flowsheet. Outcome: Progressing Towards Goal 
Fall Risk Interventions: 
  
 
  
 
Medication Interventions: Teach patient to arise slowly History of Falls Interventions: Evaluate medications/consider consulting pharmacy

## 2018-08-28 NOTE — H&P
Hospitalist Admission Note    NAME: Cristina Benitez   :  1940   MRN:  [id number]     Date/Time:  2018 10:10 PM    Patient PCP: Rylie Vila MD  ______________________________________________________________________   Assessment & Plan:  DM with recurrent hypoglycemia  --A1c 13.4 ; previously was 8 to 9 last year  --this is 3rd episode this month of severe hypoglycemia. --the question is whether home tresiba dose is too high or whether patient is taking inappropriately or she is eating sporadically. No sign of any infection or other illness to cause hypoglycemia. --low dose SSI today. --recheck A1c  --restart tresiba tomorrow if patient able to get family to bring in medication. She did not take tresiba today. Hypokalemia   --kdur 40meq x 1    HTN  --continue amlodipine, hyzaar    Hypothyroid  --continue levothyroxine    Hyperlipidemia  --continue pravachol and tricor    Hx stroke  --continue plavix    Body mass index is 26.57 kg/(m^2). Code: full  DVT prophylaxis: lovenox  Surrogate decision maker:  Son Tai Simon        Subjective:   CHIEF COMPLAINT:  unresponsive    HISTORY OF PRESENT ILLNESS:     Cristina Benitez is a 69 y.o.  female found unresponsive by family with BS 40.  She took tresiba 24 units yesterday morning. She reports eating sphahgetti for dinner but mentally challenged son reports she did not check blood sugar last night prior to bed. Denies any n/v/d, abdominal pain, weight loss. No fever/chills, chest pain. She reports eating 3 meals a day but not at consistent times. We were asked to admit for work up and evaluation of the above problems.      Past Medical History:   Diagnosis Date    Diabetes ([address]     Heart failure ([address]     unknown to family    Hypercholesteremia     Hypertension     Stroke ([address]     Thyroid disease       Past Surgical History:   Procedure Laterality Date    HX GYN      HX HEENT      thyroidectomy  HX HYSTERECTOMY      REMOVAL GALLBLADDER      THYROIDECTOMY       Social History   Substance Use Topics    Smoking status: Never Smoker    Smokeless tobacco: Never Used    Alcohol use No      Comment: been years   Lives with 2 sons    Family History   Problem Relation Age of Onset    Diabetes Father     No Known Problems Mother       No Known Allergies     Prior to Admission medications    Medication Sig Start Date End Date Taking? Authorizing Provider   pravastatin (PRAVACHOL) 80 mg tablet Take 80 mg by mouth daily. Yes Stanford Devi MD   aspirin/salicylamide/caffeine (BC HEADACHE POWDER PO) Take 1 Packet by mouth three (3) times daily as needed (back pain). Yes tSanford Devi MD   insulin degludec (TRESIBA FLEXTOUCH U-100) 100 unit/mL (3 mL) inpn 24 Units by SubCUTAneous route daily. Yes Historical Provider   fenofibrate nanocrystallized (TRICOR) 145 mg tablet TAKE 1 TABLET BY MOUTH EVERY DAY 4/10/18  Yes Eric Mai MD   levothyroxine (SYNTHROID) 150 mcg tablet TAKE 1 TABLET BY MOUTH EVERY DAY 4/10/18  Yes Eric Mai MD   clopidogrel (PLAVIX) 75 mg tab TAKE 1 TAB BY MOUTH DAILY. 4/10/18  Yes Eric Mai MD   amLODIPine (NORVASC) 10 mg tablet TAKE 1 TABLET BY MOUTH EVERY MORNING 3/28/18  Yes Eric Mai MD   losartan-hydroCHLOROthiazide [facility]) 100-25 mg per tablet TAKE 1 TABLET BY MOUTH DAILY 9/22/17  Yes Eric Mai MD   brimonidine (ALPHAGAN) 0.15 % ophthalmic solution Administer 1 Drop to both eyes two (2) times a day. 1/30/15  Yes Historical Provider     REVIEW OF SYSTEMS:  POSITIVE= Bold.   Negative = normal text  General:  fever, chills, sweats, generalized weakness, weight loss/gain, loss of appetite  Eyes:  blurred vision, eye pain, loss of vision, diplopia  Ear Nose and Throat:  rhinorrhea, pharyngitis  Respiratory:   cough, sputum production, SOB, wheezing, AGUILERA, pleuritic pain  Cardiology:  chest pain, palpitations, orthopnea, PND, edema, syncope   Gastrointestinal: abdominal pain, N/V, dysphagia, diarrhea, constipation, bleeding  Genitourinary:  frequency, urgency, dysuria, hematuria, incontinence  Muskuloskeletal :  arthralgia, myalgia  Hematology:  easy bruising, bleeding, lymphadenopathy  Dermatological:  rash, ulceration, pruritis  Endocrine:  hot flashes or polydipsia  Neurological:  headache, dizziness, confusion, focal weakness, paresthesia, memory loss, gait disturbance  Psychological: anxiety, depression, agitation      Objective:   VITALS:    Visit Vitals    /55 (BP 1 Location: Left arm, BP Patient Position: At rest)    Pulse 66    Temp 97.4 °F (36.3 °C)    Resp 16    Wt 68 kg (150 lb)    SpO2 96%    BMI 26.57 kg/m2     Temp (24hrs), Av.7 °F (36.5 °C), Min:97.4 °F (36.3 °C), Max:98.1 °F (36.7 °C)    Body mass index is 26.57 kg/(m^2). PHYSICAL EXAM:    General:    Alert, cooperative, no distress, appears stated age. HEENT: Atraumatic, anicteric sclerae, pink conjunctivae     No oral ulcers, mucosa moist, throat clear. Hearing intact. Neck:  Supple, symmetrical,  thyroid: non tender  Lungs:   Clear to auscultation bilaterally. No Wheezing or Rhonchi. No rales. Chest wall:  No tenderness  No Accessory muscle use. Heart:   Regular  rhythm,  No  murmur   No gallop. Trace edema in legs. Abdomen:   Soft, non-tender. Not distended. Bowel sounds normal. No masses  Extremities: No cyanosis. No clubbing  Skin:     Not pale Not Jaundiced  No rashes   Psych:  Good insight. Not depressed. Not anxious or agitated. Neurologic: EOMs intact. No facial asymmetry. No aphasia or slurred speech. Symmetrical strength, Alert and oriented X 3. A little forgetful with year.      IMAGING RESULTS:   []       I have personally reviewed the actual   []     CXR  []     CT scan  CXR:  CT :  EKG:   ________________________________________________________________________  Care Plan discussed with:    Comments   Patient y    [facility] Consultant:      ________________________________________________________________________  Prophylaxis:  GI none   DVT lovenox   ________________________________________________________________________  Recommended Disposition:   Home with Family y   HH/PT/OT/RN    SNF/LTC    TAYA    ________________________________________________________________________  Code Status:  Full Code y   DNR/DNI    ________________________________________________________________________  TOTAL TIME:  50 minutes  _____________________________________________________________________  Rodrigue Martínez MD      Procedures: see electronic medical records for all procedures/Xrays and details which were not copied into this note but were reviewed prior to creation of Plan. LAB DATA REVIEWED:    Recent Results (from the past 24 hour(s))   GLUCOSE, POC    Collection Time: 08/27/18  7:28 AM   Result Value Ref Range    Glucose (POC) 182 (H) 65 - 100 mg/dL    Performed by Catrina SMALL WITH AUTOMATED DIFF    Collection Time: 08/27/18  7:49 AM   Result Value Ref Range    WBC 3.5 (L) 3.6 - 11.0 K/uL    RBC 3.76 (L) 3.80 - 5.20 M/uL    HGB 12.1 11.5 - 16.0 g/dL    HCT 34.9 (L) 35.0 - 47.0 %    MCV 92.8 80.0 - 99.0 FL    MCH 32.2 26.0 - 34.0 PG    MCHC 34.7 30.0 - 36.5 g/dL    RDW 13.1 11.5 - 14.5 %    PLATELET 785 769 - 960 K/uL    MPV 10.1 8.9 - 12.9 FL    NRBC 0.0 0  WBC    ABSOLUTE NRBC 0.00 0.00 - 0.01 K/uL    NEUTROPHILS 68 32 - 75 %    LYMPHOCYTES 20 12 - 49 %    MONOCYTES 10 5 - 13 %    EOSINOPHILS 2 0 - 7 %    BASOPHILS 0 0 - 1 %    IMMATURE GRANULOCYTES 0 0.0 - 0.5 %    ABS. NEUTROPHILS 2.3 1.8 - 8.0 K/UL    ABS. LYMPHOCYTES 0.7 (L) 0.8 - 3.5 K/UL    ABS. MONOCYTES 0.4 0.0 - 1.0 K/UL    ABS. EOSINOPHILS 0.1 0.0 - 0.4 K/UL    ABS. BASOPHILS 0.0 0.0 - 0.1 K/UL    ABS. IMM.  GRANS. 0.0 0.00 - 0.04 K/UL    DF AUTOMATED      RBC COMMENTS NORMOCYTIC, NORMOCHROMIC     METABOLIC PANEL, COMPREHENSIVE    Collection Time: 08/27/18 7:49 AM   Result Value Ref Range    Sodium 141 136 - 145 mmol/L    Potassium 3.3 (L) 3.5 - 5.1 mmol/L    Chloride 106 97 - 108 mmol/L    CO2 26 21 - 32 mmol/L    Anion gap 9 5 - 15 mmol/L    Glucose 155 (H) 65 - 100 mg/dL    BUN 16 6 - 20 MG/DL    Creatinine 0.67 0.55 - 1.02 MG/DL    BUN/Creatinine ratio 24 (H) 12 - 20      GFR est AA >60 >60 ml/min/1.73m2    GFR est non-AA >60 >60 ml/min/1.73m2    Calcium 8.2 (L) 8.5 - 10.1 MG/DL    Bilirubin, total 0.3 0.2 - 1.0 MG/DL    ALT (SGPT) 22 12 - 78 U/L    AST (SGOT) 19 15 - 37 U/L    Alk.  phosphatase 70 45 - 117 U/L    Protein, total 6.4 6.4 - 8.2 g/dL    Albumin 3.3 (L) 3.5 - 5.0 g/dL    Globulin 3.1 2.0 - 4.0 g/dL    A-G Ratio 1.1 1.1 - 2.2     URINALYSIS W/ REFLEX CULTURE    Collection Time: 08/27/18  8:15 AM   Result Value Ref Range    Color YELLOW/STRAW      Appearance CLEAR CLEAR      Specific gravity 1.026 1.003 - 1.030      pH (UA) 6.0 5.0 - 8.0      Protein NEGATIVE  NEG mg/dL    Glucose >1000 (A) NEG mg/dL    Ketone NEGATIVE  NEG mg/dL    Bilirubin NEGATIVE  NEG      Blood NEGATIVE  NEG      Urobilinogen 1.0 0.2 - 1.0 EU/dL    Nitrites NEGATIVE  NEG      Leukocyte Esterase NEGATIVE  NEG      WBC 0-4 0 - 4 /hpf    RBC 0-5 0 - 5 /hpf    Epithelial cells FEW FEW /lpf    Bacteria NEGATIVE  NEG /hpf    UA:UC IF INDICATED CULTURE NOT INDICATED BY UA RESULT CNI      Hyaline cast 2-5 0 - 5 /lpf   GLUCOSE, POC    Collection Time: 08/27/18  8:31 AM   Result Value Ref Range    Glucose (POC) 127 (H) 65 - 100 mg/dL    Performed by Sam Walters, POC    Collection Time: 08/27/18 10:00 AM   Result Value Ref Range    Glucose (POC) 110 (H) 65 - 100 mg/dL    Performed by Sebastien Burch    GLUCOSE, POC    Collection Time: 08/27/18  4:28 PM   Result Value Ref Range    Glucose (POC) 274 (H) 65 - 100 mg/dL    Performed by Quinn Durán (PCT)

## 2018-08-28 NOTE — PROGRESS NOTES
General Daily Progress Note Admit Date: 8/27/2018 Subjective:  
 
Patient has no complaint . Current Facility-Administered Medications Medication Dose Route Frequency  insulin degludec inpn 30 Units  30 Units SubCUTAneous DAILY  sodium chloride (NS) flush 5-10 mL  5-10 mL IntraVENous Q8H  
 sodium chloride (NS) flush 5-10 mL  5-10 mL IntraVENous PRN  
 acetaminophen (TYLENOL) tablet 650 mg  650 mg Oral Q6H PRN  
 ondansetron (ZOFRAN) injection 4 mg  4 mg IntraVENous Q6H PRN  
 enoxaparin (LOVENOX) injection 40 mg  40 mg SubCUTAneous Q24H  
 glucose chewable tablet 16 g  4 Tab Oral PRN  
 dextrose (D50W) injection syrg 12.5-25 g  12.5-25 g IntraVENous PRN  
 glucagon (GLUCAGEN) injection 1 mg  1 mg IntraMUSCular PRN  pravastatin (PRAVACHOL) tablet 80 mg  80 mg Oral DAILY  clopidogrel (PLAVIX) tablet 75 mg  75 mg Oral DAILY  fenofibrate nanocrystallized (TRICOR) tablet 145 mg  145 mg Oral DAILY  levothyroxine (SYNTHROID) tablet 150 mcg  150 mcg Oral 6am  
 amLODIPine (NORVASC) tablet 10 mg  10 mg Oral DAILY  losartan (COZAAR) tablet 100 mg  100 mg Oral DAILY And  
 hydroCHLOROthiazide (HYDRODIURIL) tablet 25 mg  25 mg Oral DAILY  brimonidine (ALPHAGAN) 0.2 % ophthalmic solution 1 Drop  1 Drop Both Eyes BID Review of Systems A comprehensive review of systems was negative. Objective:  
 
Patient Vitals for the past 24 hrs: 
 BP Temp Pulse Resp SpO2 Weight 08/28/18 0754 - - - - 99 % -  
08/28/18 0708 120/74 97.9 °F (36.6 °C) 68 18 99 % -  
08/28/18 0354 128/57 99 °F (37.2 °C) 66 16 98 % -  
08/28/18 0013 142/55 98.3 °F (36.8 °C) 80 17 95 % -  
08/27/18 1851 132/55 97.4 °F (36.3 °C) 66 16 96 % -  
08/27/18 1526 187/64 97.6 °F (36.4 °C) 80 18 100 % -  
08/27/18 1259 189/65 97.6 °F (36.4 °C) 81 18 98 % -  
08/27/18 1215 133/57 97.8 °F (36.6 °C) 82 18 98 % -  
08/27/18 1200 144/59 - - - 97 % -  
08/27/18 1115 143/58 - - - 98 % 150 lb (68 kg) 08/27/18 1045 142/67 - - - 97 % -  
08/27/18 1000 150/55 - - - 99 % -  
08/27/18 0930 140/60 - - - 99 % -  
08/27/18 0901 152/68 - - - 100 % -  
 
  
08/26 1901 - 08/28 0700 In: 680 [P.O.:680] Out: - Physical Exam:  
Visit Vitals  /74 (BP 1 Location: Left arm, BP Patient Position: At rest)  Pulse 68  Temp 97.9 °F (36.6 °C)  Resp 18  Wt 150 lb (68 kg)  SpO2 99%  BMI 26.57 kg/m2 General appearance: alert, cooperative, no distress, appears stated age Neck: supple, symmetrical, trachea midline, no adenopathy, thyroid: not enlarged, symmetric, no tenderness/mass/nodules, no carotid bruit and no JVD Lungs: clear to auscultation bilaterally Heart: regular rate and rhythm, S1, S2 normal, no murmur, click, rub or gallop Abdomen: soft, non-tender. Bowel sounds normal. No masses,  no organomegaly Extremities: extremities normal, atraumatic, no cyanosis or edema Data Review Recent Results (from the past 24 hour(s)) GLUCOSE, POC Collection Time: 08/27/18 10:00 AM  
Result Value Ref Range Glucose (POC) 110 (H) 65 - 100 mg/dL Performed by Kayleigh Pope GLUCOSE, POC Collection Time: 08/27/18  4:28 PM  
Result Value Ref Range Glucose (POC) 274 (H) 65 - 100 mg/dL Performed by Dipesh Cheek ([facility]) GLUCOSE, POC Collection Time: 08/27/18 10:10 PM  
Result Value Ref Range Glucose (POC) 393 (H) 65 - 100 mg/dL Performed by Arnaud Headley HEMOGLOBIN A1C WITH EAG Collection Time: 08/28/18  2:33 AM  
Result Value Ref Range Hemoglobin A1c 10.9 (H) 4.2 - 6.3 % Est. average glucose 266 mg/dL GLUCOSE, POC Collection Time: 08/28/18  4:22 AM  
Result Value Ref Range Glucose (POC) 233 (H) 65 - 100 mg/dL Performed by Arnaud Headley GLUCOSE, POC Collection Time: 08/28/18  7:15 AM  
Result Value Ref Range Glucose (POC) 247 (H) 65 - 100 mg/dL Performed by Raymon Armando Assessment:  
 
Active Problems: Hypoglycemia (8/27/2018) Plan: 1. As suspected blood sugars remain elevated. The patient's problem is she does not eat meals in a timely fashion. This is in the context of uncontrolled diabetes. Thus far there appears to be no unusual fluctuations in her blood sugars. Will increase Tresiba and observe. We will continue to monitor blood sugars today. She has had repetitive visits to the emergency room at various times of the day because of symptomatic hypoglycemia. I do think the patient has a problem with memory. She has a son at home who apparently reminds her to eat but she becomes somewhat recalcitrant and decides to eat when she wants to eat in many cases too late. 2.  We will check a CT scan of her brain also.

## 2018-08-29 LAB
GLUCOSE BLD STRIP.AUTO-MCNC: 131 MG/DL (ref 65–100)
GLUCOSE BLD STRIP.AUTO-MCNC: 163 MG/DL (ref 65–100)
GLUCOSE BLD STRIP.AUTO-MCNC: 324 MG/DL (ref 65–100)
GLUCOSE BLD STRIP.AUTO-MCNC: 36 MG/DL (ref 65–100)
GLUCOSE BLD STRIP.AUTO-MCNC: 365 MG/DL (ref 65–100)
GLUCOSE BLD STRIP.AUTO-MCNC: 396 MG/DL (ref 65–100)
GLUCOSE BLD STRIP.AUTO-MCNC: 69 MG/DL (ref 65–100)
SERVICE CMNT-IMP: ABNORMAL
SERVICE CMNT-IMP: NORMAL

## 2018-08-29 PROCEDURE — 96374 THER/PROPH/DIAG INJ IV PUSH: CPT

## 2018-08-29 PROCEDURE — 74011250636 HC RX REV CODE- 250/636: Performed by: HOSPITALIST

## 2018-08-29 PROCEDURE — 74011250637 HC RX REV CODE- 250/637: Performed by: HOSPITALIST

## 2018-08-29 PROCEDURE — 94760 N-INVAS EAR/PLS OXIMETRY 1: CPT

## 2018-08-29 PROCEDURE — 96372 THER/PROPH/DIAG INJ SC/IM: CPT

## 2018-08-29 PROCEDURE — 77010033678 HC OXYGEN DAILY

## 2018-08-29 PROCEDURE — 82962 GLUCOSE BLOOD TEST: CPT

## 2018-08-29 PROCEDURE — 74011000250 HC RX REV CODE- 250: Performed by: HOSPITALIST

## 2018-08-29 PROCEDURE — 99218 HC RM OBSERVATION: CPT

## 2018-08-29 RX ADMIN — FENOFIBRATE 145 MG: 145 TABLET ORAL at 08:49

## 2018-08-29 RX ADMIN — Medication 10 ML: at 06:26

## 2018-08-29 RX ADMIN — HYDROCHLOROTHIAZIDE 25 MG: 25 TABLET ORAL at 08:49

## 2018-08-29 RX ADMIN — Medication 10 ML: at 14:08

## 2018-08-29 RX ADMIN — LOSARTAN POTASSIUM 100 MG: 100 TABLET, FILM COATED ORAL at 08:49

## 2018-08-29 RX ADMIN — BRIMONIDINE TARTRATE 1 DROP: 2 SOLUTION OPHTHALMIC at 08:50

## 2018-08-29 RX ADMIN — INSULIN DEGLUDEC 30 UNITS: 100 INJECTION, SOLUTION SUBCUTANEOUS at 08:52

## 2018-08-29 RX ADMIN — PRAVASTATIN SODIUM 80 MG: 40 TABLET ORAL at 08:49

## 2018-08-29 RX ADMIN — AMLODIPINE BESYLATE 10 MG: 5 TABLET ORAL at 08:49

## 2018-08-29 RX ADMIN — ACETAMINOPHEN 650 MG: 325 TABLET ORAL at 14:08

## 2018-08-29 RX ADMIN — ENOXAPARIN SODIUM 40 MG: 40 INJECTION, SOLUTION INTRAVENOUS; SUBCUTANEOUS at 12:52

## 2018-08-29 RX ADMIN — Medication 10 ML: at 22:01

## 2018-08-29 RX ADMIN — CLOPIDOGREL BISULFATE 75 MG: 75 TABLET ORAL at 08:49

## 2018-08-29 RX ADMIN — DEXTROSE MONOHYDRATE 25 G: 25 INJECTION, SOLUTION INTRAVENOUS at 06:22

## 2018-08-29 RX ADMIN — BRIMONIDINE TARTRATE 1 DROP: 2 SOLUTION OPHTHALMIC at 17:21

## 2018-08-29 RX ADMIN — LEVOTHYROXINE SODIUM 150 MCG: 150 TABLET ORAL at 06:12

## 2018-08-29 NOTE — PROGRESS NOTES
CM phoned patients son, Raegan Gar and explained to him that the patient is expected to be discharged today and that the patient is going to need constant reminders to eat and will need assistance with taking insulin b/c the patient has demonstrated forgetfulness with where to inject the insulin. Patients son, Raegan Gar, stated that he works nights, but another brother lives in the home and is available all day to assist the patient. Raegan Gar stated that he will transport the patient home today. Mica Gar Ext [id number]

## 2018-08-29 NOTE — PROGRESS NOTES
Problem: Falls - Risk of 
Goal: *Absence of Falls Document Candido Medeiros Fall Risk and appropriate interventions in the flowsheet. Outcome: Progressing Towards Goal 
Fall Risk Interventions: 
  
 
Mentation Interventions: Adequate sleep, hydration, pain control, More frequent rounding, Reorient patient Medication Interventions: Evaluate medications/consider consulting pharmacy, Patient to call before getting OOB, Teach patient to arise slowly History of Falls Interventions: Door open when patient unattended

## 2018-08-29 NOTE — PROGRESS NOTES
Bedside and Verbal shift change report given to Willy Wooten (oncoming nurse) by Ariadna Craven (offgoing nurse). Report included the following information SBAR, Kardex, MAR and Med Rec Status. - patient blood sugar is 396. Per Dr. Denise Hawk this morning, short acting insulin should not be prescribed to the patient due to sensitivity. MD is aware of the spikes in blood sugar during the day.

## 2018-08-29 NOTE — PROGRESS NOTES
9128 In this morning to administer patient AM meds and patient found to be disorientedx4. Blood glucose checked and result of 36. 25g of dextrose given PIV, in addition to offering juice. 1342 Patient blood glucose 69 and patient becoming more alert. 1773 Patient Bg 131. Patient a&o x3 and reports feeling much better. Will continue to monitor.

## 2018-08-29 NOTE — DIABETES MGMT
DTC Consult Note Recommendations/ Comments: Noted pt with critical BG value of 36mg/dL @ 0616 on am POC. Suspect this is related to Humalog correction dose of 12 units given yesterday afternoon. If appropriate, please consider decreasing Tresiba dose to 15 units daily (this is approx 80% of pt weight based calculated needs 0.25U/kg body wt) and titrate as needed. May consider starting with weight based calculation given pt extreme variations in BG. Pt will also likely require some type of prandial glucose control. May consider starting with Tradjenta 5mg daily, as a safer option given pt sensitivity to Lispro and reported inconsistent eating habits. Current hospital DM medication: Tresiba 30 units Chart reviewed and initial evaluation complete on Deana Salvatore. Patient is a 69 y.o. female with known DM on Tresiba 24 units in am at home. A1c:  
Lab Results Component Value Date/Time Hemoglobin A1c 10.9 (H) 08/28/2018 02:33 AM  
 
 
Recent Glucose Results:  
Lab Results Component Value Date/Time GLUCPOC 396 (H) 08/29/2018 11:18 AM  
 GLUCPOC 163 (H) 08/29/2018 07:17 AM  
 GLUCPOC 131 (H) 08/29/2018 06:34 AM  
  
 
Lab Results Component Value Date/Time Creatinine 0.67 08/27/2018 07:49 AM  
 
Estimated Creatinine Clearance: 66.1 mL/min (based on Cr of 0.67). Active Orders Diet DIET DIABETIC CONSISTENT CARB Regular PO intake:  
Patient Vitals for the past 72 hrs: 
 % Diet Eaten 08/29/18 0933 95 % 08/28/18 1024 100 % 08/27/18 1537 100 % Will continue to follow as needed. Thank you. Sharee Haney RD, CDE [facility]

## 2018-08-29 NOTE — PROGRESS NOTES
General Daily Progress Note Admit Date: 8/27/2018 Subjective:  
 
Patient has no complaint . Current Facility-Administered Medications Medication Dose Route Frequency  insulin degludec inpn 30 Units (Patient Supplied)  30 Units SubCUTAneous DAILY  sodium chloride (NS) flush 5-10 mL  5-10 mL IntraVENous Q8H  
 sodium chloride (NS) flush 5-10 mL  5-10 mL IntraVENous PRN  
 acetaminophen (TYLENOL) tablet 650 mg  650 mg Oral Q6H PRN  
 ondansetron (ZOFRAN) injection 4 mg  4 mg IntraVENous Q6H PRN  
 enoxaparin (LOVENOX) injection 40 mg  40 mg SubCUTAneous Q24H  
 glucose chewable tablet 16 g  4 Tab Oral PRN  
 dextrose (D50W) injection syrg 12.5-25 g  12.5-25 g IntraVENous PRN  
 glucagon (GLUCAGEN) injection 1 mg  1 mg IntraMUSCular PRN  pravastatin (PRAVACHOL) tablet 80 mg  80 mg Oral DAILY  clopidogrel (PLAVIX) tablet 75 mg  75 mg Oral DAILY  fenofibrate nanocrystallized (TRICOR) tablet 145 mg  145 mg Oral DAILY  levothyroxine (SYNTHROID) tablet 150 mcg  150 mcg Oral 6am  
 amLODIPine (NORVASC) tablet 10 mg  10 mg Oral DAILY  losartan (COZAAR) tablet 100 mg  100 mg Oral DAILY And  
 hydroCHLOROthiazide (HYDRODIURIL) tablet 25 mg  25 mg Oral DAILY  brimonidine (ALPHAGAN) 0.2 % ophthalmic solution 1 Drop  1 Drop Both Eyes BID Review of Systems A comprehensive review of systems was negative. Objective:  
 
Patient Vitals for the past 24 hrs: 
 BP Temp Pulse Resp SpO2  
08/29/18 0812 128/77 98.9 °F (37.2 °C) 85 16 98 % 08/29/18 0350 139/50 98.4 °F (36.9 °C) 69 17 97 % 08/28/18 2300 118/58 99 °F (37.2 °C) 70 16 100 % 08/28/18 2020 124/63 98 °F (36.7 °C) 71 17 100 % 08/28/18 1447 132/47 98.4 °F (36.9 °C) 76 16 100 % 08/28/18 1102 154/73 98.2 °F (36.8 °C) 72 16 100 % 08/27 1901 - 08/29 0700 In: 1260 [P.O.:1260] Out: - Physical Exam:  
Visit Vitals  /77 (BP 1 Location: Left arm, BP Patient Position: Sitting)  Pulse 85  Temp 98.9 °F (37.2 °C)  Resp 16  Wt 150 lb (68 kg)  SpO2 98%  BMI 26.57 kg/m2 General appearance: alert, cooperative, no distress, appears stated age Neck: supple, symmetrical, trachea midline, no adenopathy, thyroid: not enlarged, symmetric, no tenderness/mass/nodules, no carotid bruit and no JVD Lungs: clear to auscultation bilaterally Heart: regular rate and rhythm, S1, S2 normal, no murmur, click, rub or gallop Abdomen: soft, non-tender. Bowel sounds normal. No masses,  no organomegaly Extremities: extremities normal, atraumatic, no cyanosis or edema Data Review Recent Results (from the past 24 hour(s)) GLUCOSE, POC Collection Time: 08/28/18 11:05 AM  
Result Value Ref Range Glucose (POC) 396 (H) 65 - 100 mg/dL Performed by Margie Lorenz GLUCOSE, POC Collection Time: 08/28/18  3:57 PM  
Result Value Ref Range Glucose (POC) 452 (H) 65 - 100 mg/dL Performed by [employer] (PCT) TSH 3RD GENERATION Collection Time: 08/28/18  7:41 PM  
Result Value Ref Range TSH 0.96 0.36 - 3.74 uIU/mL GLUCOSE, POC Collection Time: 08/28/18  9:35 PM  
Result Value Ref Range Glucose (POC) 113 (H) 65 - 100 mg/dL Performed by Jean-Paul Singleton GLUCOSE, POC Collection Time: 08/29/18  6:16 AM  
Result Value Ref Range Glucose (POC) 36 (LL) 65 - 100 mg/dL Performed by Sarah [place] GLUCOSE, POC Collection Time: 08/29/18  6:20 AM  
Result Value Ref Range Glucose (POC) 69 65 - 100 mg/dL Performed by Sarah Estesnet GLUCOSE, POC Collection Time: 08/29/18  6:34 AM  
Result Value Ref Range Glucose (POC) 131 (H) 65 - 100 mg/dL Performed by Sarah Estesnet GLUCOSE, POC Collection Time: 08/29/18  7:17 AM  
Result Value Ref Range Glucose (POC) 163 (H) 65 - 100 mg/dL Performed by Karen Hyde* Assessment:  
 
Principal Problem: Hypoglycemia (8/27/2018) Active Problems: 
  Diabetes mellitus ([address] (2/6/2015) Hypertension (2/6/2015) Hypothyroidism (2/6/2015) Plan: 1. Hypoglycemia yesterday after receiving short-acting insulin. She has a significant sensitivity to short acting insulin. Continue basal insulin for now and observe blood sugars today. Everything still points to noncompliance with eating and inadequate blood sugar assessment. 2.  Continue BP control. 3.  Discharge tomorrow if blood sugar results are reasonable.

## 2018-08-30 LAB
GLUCOSE BLD STRIP.AUTO-MCNC: 121 MG/DL (ref 65–100)
GLUCOSE BLD STRIP.AUTO-MCNC: 210 MG/DL (ref 65–100)
GLUCOSE BLD STRIP.AUTO-MCNC: 285 MG/DL (ref 65–100)
GLUCOSE BLD STRIP.AUTO-MCNC: 364 MG/DL (ref 65–100)
GLUCOSE BLD STRIP.AUTO-MCNC: 49 MG/DL (ref 65–100)
GLUCOSE BLD STRIP.AUTO-MCNC: 50 MG/DL (ref 65–100)
SERVICE CMNT-IMP: ABNORMAL

## 2018-08-30 PROCEDURE — 82962 GLUCOSE BLOOD TEST: CPT

## 2018-08-30 PROCEDURE — 99218 HC RM OBSERVATION: CPT

## 2018-08-30 PROCEDURE — 74011250636 HC RX REV CODE- 250/636: Performed by: HOSPITALIST

## 2018-08-30 PROCEDURE — 94760 N-INVAS EAR/PLS OXIMETRY 1: CPT

## 2018-08-30 PROCEDURE — 96372 THER/PROPH/DIAG INJ SC/IM: CPT

## 2018-08-30 PROCEDURE — 96376 TX/PRO/DX INJ SAME DRUG ADON: CPT

## 2018-08-30 PROCEDURE — 74011000250 HC RX REV CODE- 250: Performed by: HOSPITALIST

## 2018-08-30 PROCEDURE — 74011250637 HC RX REV CODE- 250/637: Performed by: HOSPITALIST

## 2018-08-30 RX ADMIN — PRAVASTATIN SODIUM 80 MG: 40 TABLET ORAL at 08:42

## 2018-08-30 RX ADMIN — LEVOTHYROXINE SODIUM 150 MCG: 150 TABLET ORAL at 06:27

## 2018-08-30 RX ADMIN — DEXTROSE MONOHYDRATE 25 G: 25 INJECTION, SOLUTION INTRAVENOUS at 07:53

## 2018-08-30 RX ADMIN — AMLODIPINE BESYLATE 10 MG: 5 TABLET ORAL at 08:42

## 2018-08-30 RX ADMIN — LOSARTAN POTASSIUM 100 MG: 100 TABLET, FILM COATED ORAL at 08:43

## 2018-08-30 RX ADMIN — Medication 10 ML: at 22:27

## 2018-08-30 RX ADMIN — CLOPIDOGREL BISULFATE 75 MG: 75 TABLET ORAL at 08:42

## 2018-08-30 RX ADMIN — HYDROCHLOROTHIAZIDE 25 MG: 25 TABLET ORAL at 08:42

## 2018-08-30 RX ADMIN — FENOFIBRATE 145 MG: 145 TABLET ORAL at 08:42

## 2018-08-30 RX ADMIN — Medication 10 ML: at 13:09

## 2018-08-30 RX ADMIN — INSULIN DEGLUDEC 30 UNITS: 100 INJECTION, SOLUTION SUBCUTANEOUS at 08:45

## 2018-08-30 RX ADMIN — BRIMONIDINE TARTRATE 1 DROP: 2 SOLUTION OPHTHALMIC at 08:45

## 2018-08-30 RX ADMIN — ENOXAPARIN SODIUM 40 MG: 40 INJECTION, SOLUTION INTRAVENOUS; SUBCUTANEOUS at 13:08

## 2018-08-30 RX ADMIN — ACETAMINOPHEN 650 MG: 325 TABLET ORAL at 08:42

## 2018-08-30 RX ADMIN — BRIMONIDINE TARTRATE 1 DROP: 2 SOLUTION OPHTHALMIC at 18:29

## 2018-08-30 NOTE — PROGRESS NOTES
Bedside and Verbal shift change report given to pooja hardy (oncoming nurse) by Nuria Darby (offgoing nurse). Report included the following information SBAR, Procedure Summary, Intake/Output, MAR and Recent Results.

## 2018-08-30 NOTE — PROGRESS NOTES
General Surgery End of Shift Nursing Note Bedside shift change report given to [employer] (oncoming nurse) by Bryant Cain. Tomas Magdaleno RN (offgoing nurse). Report included the following information SBAR, Kardex, Intake/Output, MAR and Recent Results. Shift worked:   7a to 3p Summary of shift:    Tolerated the day, put in for nutrition to see patient about diet. Monitor BS every 4 hrs. Issues for physician to address:   none Number times ambulated in hallway past shift: in room Number of times OOB to chair past shift: 2 Pain Management: 
Current medication: none Patient states pain is manageable on current pain medication: YES 
 
GI: 
 
Current diet:  DIET DIABETIC CONSISTENT CARB Regular Tolerating current diet: YES Passing flatus: YES Last Bowel Movement: 8/26/18 Appearance: soft Respiratory: 
 
Incentive Spirometer at bedside: YES Patient instructed on use: YES Patient Safety: 
 
Falls Score: 2 Bed Alarm On? No 
Sitter?  No 
 
Yinka Joya RN

## 2018-08-30 NOTE — PROGRESS NOTES
Nutrition: Consult received for diet education; noted issues with hypoglycemia in the morning. HS snack added by MD. DTC has been following and making recommendations; has also provided patient with diet education on balanced meals to include a protein+starch+vegetable. Discussed patient with student nurse earlier and patient with questions regarding appropriate snacks. MD added HS snack to diet order today. Visited with patient this afternoon; she had to think long and hard about whether she actually had a snack last night. Eventually stated she thought she did and believes it was 2 packs of binh crackers. Encouraged pairing her carbohydrates with a protein and to try including peanut butter next time. Discussed other appropriate snacks, eating consistently throughout the day, and reinforced balanced meals. Patient reports plans to drink her glucerna shake when she gets home (16g CHO, 10g PRO) which would be an appropriate snack. Encouraged intake of meals and set up sandwich for HS snack tonight. Thank you. Warren Elam, CORY Pager [phone]

## 2018-08-30 NOTE — DIABETES MGMT
DTC Progress Note Recommendations/ Comments: Called and spoke with Pradip at Dr. Dewayne Paz office regarding pt continued hypoglycemia and suggested decreasing Tresiba dose and he would relay message to Dr. Mikael Mota. Pradip from office returned call and stated Dr. Mikael Mota would assess pt BG in the morning. If appropriate, please consider decreasing Tresiba dose to 15 units daily (this is approx 80% of pt weight based calculated needs 0.25U/kg body wt) and titrate as needed. May consider starting with weight based calculation given pt extreme variations in BG. Pt will also likely require some type of prandial glucose control. May consider starting with Tradjenta 5mg daily, as a safer option given pt sensitivity to Lispro and reported inconsistent eating habits. Current hospital DM medication: Tresiba 30 units Chart reviewed and initial evaluation complete on Deana Chase. Patient is a 69 y.o. female with known DM on Tresiba 24 units in am at home. A1c:  
Lab Results Component Value Date/Time Hemoglobin A1c 10.9 (H) 08/28/2018 02:33 AM  
 
 
Recent Glucose Results:  
Lab Results Component Value Date/Time GLUCPOC 364 (H) 08/30/2018 10:38 AM  
 GLUCPOC 285 (H) 08/30/2018 08:08 AM  
 GLUCPOC 50 (L) 08/30/2018 07:35 AM  
  
 
Lab Results Component Value Date/Time Creatinine 0.67 08/27/2018 07:49 AM  
 
Estimated Creatinine Clearance: 66.1 mL/min (based on Cr of 0.67). Active Orders Diet DIET DIABETIC CONSISTENT CARB Regular PO intake:  
Patient Vitals for the past 72 hrs: 
 % Diet Eaten 08/30/18 1310 100 % 08/30/18 0835 100 % 08/30/18 0831 80 % 08/29/18 0933 95 % 08/28/18 1024 100 % Will continue to follow as needed. Thank you. Marco A Wei RD, CDE [facility]

## 2018-08-30 NOTE — PROGRESS NOTES
General Daily Progress Note Admit Date: 8/27/2018 Subjective:  
 
Patient has no complaint Current Facility-Administered Medications Medication Dose Route Frequency  insulin degludec inpn 30 Units (Patient Supplied)  30 Units SubCUTAneous DAILY  sodium chloride (NS) flush 5-10 mL  5-10 mL IntraVENous Q8H  
 sodium chloride (NS) flush 5-10 mL  5-10 mL IntraVENous PRN  
 acetaminophen (TYLENOL) tablet 650 mg  650 mg Oral Q6H PRN  
 ondansetron (ZOFRAN) injection 4 mg  4 mg IntraVENous Q6H PRN  
 enoxaparin (LOVENOX) injection 40 mg  40 mg SubCUTAneous Q24H  
 glucose chewable tablet 16 g  4 Tab Oral PRN  
 dextrose (D50W) injection syrg 12.5-25 g  12.5-25 g IntraVENous PRN  
 glucagon (GLUCAGEN) injection 1 mg  1 mg IntraMUSCular PRN  pravastatin (PRAVACHOL) tablet 80 mg  80 mg Oral DAILY  clopidogrel (PLAVIX) tablet 75 mg  75 mg Oral DAILY  fenofibrate nanocrystallized (TRICOR) tablet 145 mg  145 mg Oral DAILY  levothyroxine (SYNTHROID) tablet 150 mcg  150 mcg Oral 6am  
 amLODIPine (NORVASC) tablet 10 mg  10 mg Oral DAILY  losartan (COZAAR) tablet 100 mg  100 mg Oral DAILY And  
 hydroCHLOROthiazide (HYDRODIURIL) tablet 25 mg  25 mg Oral DAILY  brimonidine (ALPHAGAN) 0.2 % ophthalmic solution 1 Drop  1 Drop Both Eyes BID Review of Systems A comprehensive review of systems was negative. Objective:  
 
Patient Vitals for the past 24 hrs: 
 BP Temp Pulse Resp SpO2  
08/30/18 0819 - - - - 97 % 08/30/18 0810 161/80 98.3 °F (36.8 °C) 82 16 97 % 08/30/18 0709 150/64 97.9 °F (36.6 °C) 72 16 99 % 08/30/18 0336 110/59 98.5 °F (36.9 °C) 67 16 99 % 08/29/18 2339 108/60 98.3 °F (36.8 °C) 69 16 100 % 08/29/18 1939 131/60 98.3 °F (36.8 °C) 64 16 98 % 08/29/18 1452 144/58 98 °F (36.7 °C) 66 16 99 % 08/29/18 1116 120/55 97.8 °F (36.6 °C) 66 16 98 % 08/30 0701 - 08/30 1900 In: 120 [P.O.:120] Out: -  
08/28 1901 - 08/30 0700 In: 240 [P.O.:240] Out: - Physical Exam:  
Visit Vitals  /80 (BP 1 Location: Left arm, BP Patient Position: Sitting)  Pulse 82  Temp 98.3 °F (36.8 °C)  Resp 16  Wt 150 lb (68 kg)  SpO2 97%  BMI 26.57 kg/m2 General appearance: alert, cooperative, no distress, appears stated age Neck: supple, symmetrical, trachea midline, no adenopathy, thyroid: not enlarged, symmetric, no tenderness/mass/nodules, no carotid bruit and no JVD Lungs: clear to auscultation bilaterally Heart: regular rate and rhythm, S1, S2 normal, no murmur, click, rub or gallop Abdomen: soft, non-tender. Bowel sounds normal. No masses,  no organomegaly Extremities: extremities normal, atraumatic, no cyanosis or edema Neurologic: Grossly normal 
   
 
Data Review Recent Results (from the past 24 hour(s)) GLUCOSE, POC Collection Time: 08/29/18 11:18 AM  
Result Value Ref Range Glucose (POC) 396 (H) 65 - 100 mg/dL Performed by Ese Josue* GLUCOSE, POC Collection Time: 08/29/18  4:14 PM  
Result Value Ref Range Glucose (POC) 365 (H) 65 - 100 mg/dL Performed by Rafi Zee (PCT) GLUCOSE, POC Collection Time: 08/29/18  8:50 PM  
Result Value Ref Range Glucose (POC) 324 (H) 65 - 100 mg/dL Performed by Jose Alexis (PCT) GLUCOSE, POC Collection Time: 08/30/18 12:07 AM  
Result Value Ref Range Glucose (POC) 121 (H) 65 - 100 mg/dL Performed by Jose Alexis (PCT) GLUCOSE, POC Collection Time: 08/30/18  7:15 AM  
Result Value Ref Range Glucose (POC) 49 (LL) 65 - 100 mg/dL Performed by Saul REYES(CON) GLUCOSE, POC Collection Time: 08/30/18  7:35 AM  
Result Value Ref Range Glucose (POC) 50 (L) 65 - 100 mg/dL Performed by Saul REYES(CON) GLUCOSE, POC Collection Time: 08/30/18  8:08 AM  
Result Value Ref Range Glucose (POC) 285 (H) 65 - 100 mg/dL Performed by Saul REYES(CON) Assessment:  
 
Principal Problem: Hypoglycemia (8/27/2018) Active Problems: Diabetes mellitus ([facility] [address] (2/6/2015) Hypertension (2/6/2015) Hypothyroidism (2/6/2015) Plan: 1. The patient continues to have hypoglycemia during the night which is quite dangerous. Her bedtime snack has to be increased and to include more protein items. I will not change the dose of her basal insulin but she is not a candidate for short acting insulin because of her sensitivity. She would however be a great candidate for a GLP-1 agonist coupled with more doses of her basal insulin. 2.  Hopefully discharge tomorrow if all goes well. This needs complete resolution prevent unintended symptomatic hypoglycemia.

## 2018-08-31 VITALS
DIASTOLIC BLOOD PRESSURE: 51 MMHG | BODY MASS INDEX: 26.57 KG/M2 | RESPIRATION RATE: 18 BRPM | HEART RATE: 71 BPM | TEMPERATURE: 98.5 F | SYSTOLIC BLOOD PRESSURE: 101 MMHG | OXYGEN SATURATION: 100 % | WEIGHT: 150 LBS

## 2018-08-31 LAB
GLUCOSE BLD STRIP.AUTO-MCNC: 178 MG/DL (ref 65–100)
GLUCOSE BLD STRIP.AUTO-MCNC: 29 MG/DL (ref 65–100)
GLUCOSE BLD STRIP.AUTO-MCNC: 29 MG/DL (ref 65–100)
GLUCOSE BLD STRIP.AUTO-MCNC: 308 MG/DL (ref 65–100)
GLUCOSE BLD STRIP.AUTO-MCNC: 85 MG/DL (ref 65–100)
SERVICE CMNT-IMP: ABNORMAL
SERVICE CMNT-IMP: NORMAL

## 2018-08-31 PROCEDURE — 96376 TX/PRO/DX INJ SAME DRUG ADON: CPT

## 2018-08-31 PROCEDURE — 82962 GLUCOSE BLOOD TEST: CPT

## 2018-08-31 PROCEDURE — 99218 HC RM OBSERVATION: CPT

## 2018-08-31 PROCEDURE — 74011250637 HC RX REV CODE- 250/637: Performed by: HOSPITALIST

## 2018-08-31 PROCEDURE — 74011000250 HC RX REV CODE- 250: Performed by: HOSPITALIST

## 2018-08-31 RX ORDER — INSULIN DEGLUDEC 100 U/ML
24 INJECTION, SOLUTION SUBCUTANEOUS DAILY
Status: DISCONTINUED | OUTPATIENT
Start: 2018-08-31 | End: 2018-08-31 | Stop reason: HOSPADM

## 2018-08-31 RX ADMIN — CLOPIDOGREL BISULFATE 75 MG: 75 TABLET ORAL at 08:12

## 2018-08-31 RX ADMIN — PRAVASTATIN SODIUM 80 MG: 40 TABLET ORAL at 08:29

## 2018-08-31 RX ADMIN — INSULIN DEGLUDEC 24 UNITS: 100 INJECTION, SOLUTION SUBCUTANEOUS at 10:07

## 2018-08-31 RX ADMIN — LEVOTHYROXINE SODIUM 150 MCG: 150 TABLET ORAL at 06:10

## 2018-08-31 RX ADMIN — AMLODIPINE BESYLATE 10 MG: 5 TABLET ORAL at 08:12

## 2018-08-31 RX ADMIN — DEXTROSE MONOHYDRATE 25 G: 25 INJECTION, SOLUTION INTRAVENOUS at 00:00

## 2018-08-31 RX ADMIN — FENOFIBRATE 145 MG: 145 TABLET ORAL at 08:12

## 2018-08-31 RX ADMIN — HYDROCHLOROTHIAZIDE 25 MG: 25 TABLET ORAL at 08:12

## 2018-08-31 RX ADMIN — BRIMONIDINE TARTRATE 1 DROP: 2 SOLUTION OPHTHALMIC at 08:15

## 2018-08-31 RX ADMIN — LOSARTAN POTASSIUM 100 MG: 100 TABLET, FILM COATED ORAL at 08:12

## 2018-08-31 NOTE — PROGRESS NOTES
Problem: Falls - Risk of 
Goal: *Absence of Falls Document Margarita Marte Fall Risk and appropriate interventions in the flowsheet. Outcome: Progressing Towards Goal 
Fall Risk Interventions: 
  
 
Mentation Interventions: Adequate sleep, hydration, pain control, More frequent rounding, Reorient patient Medication Interventions: Patient to call before getting OOB History of Falls Interventions: Door open when patient unattended

## 2018-08-31 NOTE — PROGRESS NOTES
HYPOGLYCEMIC EPISODE DOCUMENTATION Patient with hypoglycemic episode(s) at 0601(time) on 08/31(date). BG value(s) pre-treatment 29 Was patient symptomatic? [x] yes, [] no 
Patient was treated with the following rescue medications/treatments: [x] D50 [] Glucose tablets 
              [] Glucagon 
              [] 4oz juice 
              [] 6oz reg soda 
              [] 8oz low fat milk BG value post-treatment: 179 Once BG treated and value greater than 80mg/dl, pt was provided with the following: 
[x] snack 
[] meal 
Name of MD notified:willpage and inform Asa Chase The following orders were received:

## 2018-08-31 NOTE — DISCHARGE INSTRUCTIONS
General Discharge Instructions    Patient ID:  Zack Simmons  [id number]  69 y.o.  1940    Patient Instructions        The following personal items were collected during your admission and were returned to you. Take Home Medications           What to do at Home    Recommended diet: Diabetic Diet    Recommended activity: Activity as tolerated    Follow-up with Christine Whitney MD  in 4 days. You must check blood sugars before every meal and at bedtime and write the values down. It is important that you eat all of your meals at the same time every day including your bedtime snack. Information obtained by :  I understand that if any problems occur once I am at home I am to contact my physician. I understand and acknowledge receipt of the instructions indicated above.                                                                                                                                            Physician's or R.N.'s Signature                                                                  Date/Time                                                                                                                                              Patient or Representative Signature                                                          Date/Time

## 2018-09-01 NOTE — PROGRESS NOTES
Problem: Falls - Risk of 
Goal: *Absence of Falls Document Clide Failing Fall Risk and appropriate interventions in the flowsheet. Outcome: Progressing Towards Goal 
Fall Risk Interventions: 
  
 
Mentation Interventions: Adequate sleep, hydration, pain control, More frequent rounding, Reorient patient Medication Interventions: Patient to call before getting OOB, Teach patient to arise slowly History of Falls Interventions: Door open when patient unattended Comments: Oriented No tele RA Voids Up ad abe BS HS/AM 29 - Tracie BANUELOS gave D50 -> 179 
 
------------------------------------ 
 
MD reports to hold 30 units and give 24 of home Insulin And Discharge today

## 2018-09-04 ENCOUNTER — PATIENT OUTREACH (OUTPATIENT)
Dept: INTERNAL MEDICINE CLINIC | Age: 78
End: 2018-09-04

## 2018-09-04 NOTE — PROGRESS NOTES
Hospital Discharge Follow-Up Date/Time:  2018 5:06 PM 
 
Patient was admitted to [facility] on 2018 and discharged on 2018 for Hypoglycemia. The physician discharge summary was available at the time of outreach. Patient was contacted within 2 business days of discharge. Top Challenges reviewed with the provider Memory-- missed appointment Method of communication with provider : face to face Inpatient RRAT score: 9 Was this a readmission? no  
Patient stated reason for the readmission for IP:   n/a Nurse Navigator (NN) contacted the patient by telephone to perform post hospital discharge assessment. Verified name and  with patient as identifiers. Provided introduction to self, and explanation of the Nurse Navigator role. Reviewed discharge instructions and red flags with patient who verbalized understanding. Patient given an opportunity to ask questions and does not have any further questions or concerns at this time. The patient agrees to contact the PCP office for questions related to their healthcare. NN provided contact information for future reference. Disease Specific:   diabetes Summary of patient's top problems: 1. Hypoglycemic episodes due to not eating Home Health orders at discharge: no 
[address]: none Date of initial visit: n/a Durable Medical Equipment ordered/company: none Durable Medical Equipment received: n/a Barriers to care? Support system: Son may need to start medication administration rather than patient giving on insulin; son had told IP  that he works nights but his brother is able to assist with medications. Advance Care Planning:  
Does patient have an Advance Directive:  not on file; education provided (agreed to information to be given to son). Medication(s):  
New Medications at Discharge: none Changed Medications at Discharge: no 
Discontinued Medications at Discharge: n/a 
 
 Medication reconciliation was performed with patient, who verbalizes understanding of administration of home medications. There were no barriers to obtaining medications identified at this time. Referral to Pharm D needed: no  
 
Current Outpatient Prescriptions Medication Sig  
 insulin detemir U-100 (LEVEMIR FLEXTOUCH) 100 unit/mL (3 mL) inpn 30 units every morning3  
 pravastatin (PRAVACHOL) 80 mg tablet Take 80 mg by mouth daily.  aspirin/salicylamide/caffeine (BC HEADACHE POWDER PO) Take 1 Packet by mouth three (3) times daily as needed (back pain).  fenofibrate nanocrystallized (TRICOR) 145 mg tablet TAKE 1 TABLET BY MOUTH EVERY DAY  levothyroxine (SYNTHROID) 150 mcg tablet TAKE 1 TABLET BY MOUTH EVERY DAY  clopidogrel (PLAVIX) 75 mg tab TAKE 1 TAB BY MOUTH DAILY.  amLODIPine (NORVASC) 10 mg tablet TAKE 1 TABLET BY MOUTH EVERY MORNING  
 losartan-hydroCHLOROthiazide (HYZAAR) 100-25 mg per tablet TAKE 1 TABLET BY MOUTH DAILY  brimonidine (ALPHAGAN) 0.15 % ophthalmic solution Administer 1 Drop to both eyes two (2) times a day. No current facility-administered medications for this visit. There are no discontinued medications. [facility] follow up appointment(s):  
Future Appointments Date Time Provider Tawnya Evans 9/6/2018 1:15 PM Sarahi Almendarez MD [address] [place] [address]  
9/18/2018 4:15 PM Sarahi Almendarez MD [address] [place] [address]  
10/11/2018 4:30 PM Sarahi Almendarez MD [address] [place] [address] Non-[facility] follow up appointment(s): no 
[facility]:  n/a  
 
 
Goals  Attends follow-up appointments as directed. Pt will follow up as scheduled with Dr Glenis Cunningham on 11/14. Patient refused to up office appointment until reviewing schedule for new appointments.    
 
9/4/2018:  NN contacted patient as ALEKSANDAR d/c; today is following the holiday-patient stated she got her days mixed up as son was ready to bring her to the office appointment. NN rescheduled patient for 9/6/2018--patient unable to come tomorrow due to obligations. EW  
  
  Patient verbalizes understanding of self -management goals of living with Diabetes. Patient will verbalize understanding of self -management goals of living with Diabetes. Presently, patient states she doesn't know what to do with the new diabetic medications. PCP ordered patient to come in to office today to [facility]. 9/4/2018:  Patient asked if PCP had changed her insulin; stated she thinks due to her not eating much, it needs to be changed. Patient was to come for [employer] today but states she got days mixed up but agreed to reschedule. Patient encouraged to decide foods she likes and try to eat the proper amounts from those rather than a full course meal she does not like. Patient agreed to make a list of likes for the son who grocery shops will bring those foods. Patient states she will ask PCP to decrease insulin. EW  
  
  Understands red flags post discharge. Pt has agreed to contact PCP with any sxs of hyper/hypoglycemia. Patient sound weak compared to [employer] call 2 days ago. Patient stated ambulance was there early this morning. PCP advised patient to come to office immediately today. Goal completion:   9/18/2018:  Medication assistance

## 2018-09-06 ENCOUNTER — OFFICE VISIT (OUTPATIENT)
Dept: INTERNAL MEDICINE CLINIC | Age: 78
End: 2018-09-06

## 2018-09-06 VITALS
TEMPERATURE: 98 F | SYSTOLIC BLOOD PRESSURE: 173 MMHG | HEART RATE: 76 BPM | HEIGHT: 63 IN | WEIGHT: 170 LBS | BODY MASS INDEX: 30.12 KG/M2 | OXYGEN SATURATION: 100 % | DIASTOLIC BLOOD PRESSURE: 62 MMHG | RESPIRATION RATE: 19 BRPM

## 2018-09-06 DIAGNOSIS — E03.2 HYPOTHYROIDISM DUE TO MEDICATION: ICD-10-CM

## 2018-09-06 DIAGNOSIS — E78.5 DYSLIPIDEMIA: ICD-10-CM

## 2018-09-06 DIAGNOSIS — E10.649 HYPOGLYCEMIC UNAWARENESS ASSOCIATED WITH TYPE 1 DIABETES MELLITUS: ICD-10-CM

## 2018-09-06 DIAGNOSIS — E11.9 TYPE 2 DIABETES MELLITUS WITHOUT COMPLICATION, WITH LONG-TERM CURRENT USE OF INSULIN (HCC): Primary | ICD-10-CM

## 2018-09-06 DIAGNOSIS — I10 ESSENTIAL HYPERTENSION: ICD-10-CM

## 2018-09-06 DIAGNOSIS — R41.3 MEMORY DEFICIT: ICD-10-CM

## 2018-09-06 DIAGNOSIS — Z79.4 TYPE 2 DIABETES MELLITUS WITHOUT COMPLICATION, WITH LONG-TERM CURRENT USE OF INSULIN (HCC): Primary | ICD-10-CM

## 2018-09-06 LAB — GLUCOSE POC: 405 MG/DL

## 2018-09-06 NOTE — MR AVS SNAPSHOT
[facility] [place], [facility] 
 
 
 [address] [id number] 
[phone] Patient: Gayle Ledbetter MRN: [id number] [facility]:86/53/3041 Visit Information Date & Time Provider Department Dept. Phone Encounter #  
 9/6/2018  1:15 PM Markel Hu MD [facility] [phone] [id number] Your Appointments 9/18/2018  4:15 PM  
Any with Markel Hu MD  
[address] [place] [address] and Primary Care [facility] Appt Note: 3 week follow up  
 [address] [facility]  
  
   
 [address] [id number]  
  
    
 10/11/2018  4:30 PM  
Any with Markel Hu MD  
[address] [place] [address] and Primary Care [facility] Appt Note: 3 month follow up  
 [address] [facility]  
[phone] Upcoming Health Maintenance Date Due  
 FOOT EXAM Q1 5/8/2016 LIPID PANEL Q1 7/14/2017 Influenza Age 5 to Adult 8/1/2018 EYE EXAM RETINAL OR DILATED Q1 9/24/2018* MEDICARE YEARLY EXAM 10/13/2018 HEMOGLOBIN A1C Q6M 2/28/2019 MICROALBUMIN Q1 7/10/2019 GLAUCOMA SCREENING Q2Y 12/21/2019 DTaP/Tdap/Td series (2 - Td) 2/2/2027 *Topic was postponed. The date shown is not the original due date. Allergies as of 9/6/2018  Review Complete On: 9/6/2018 By: Nuria Hanson LPN No Known Allergies Current Immunizations  Reviewed on 12/25/2017 Name Date Influenza Vaccine Split  Deferred (Patient Refused) ZZZ-RETIRED (DO NOT USE) Pneumococcal Vaccine (Unspecified Type) 11/11/2012 12:57 PM  
  
 Not reviewed this visit You Were Diagnosed With   
  
 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 
ICD-9-CM: 401.9 Dyslipidemia     ICD-10-CM: E78.5 ICD-9-CM: 272.4 Vitals BP Pulse Temp Resp Height(growth percentile) Weight(growth percentile) 173/62 (BP 1 Location: Left arm, BP Patient Position: Sitting) 76 98 °F (36.7 °C) (Oral) 19 5' 3\" (1.6 m) 170 lb (77.1 kg) SpO2 BMI OB Status Smoking Status 100% 30.11 kg/m2 Hysterectomy Never Smoker Vitals History BMI and BSA Data Body Mass Index Body Surface Area  
 30.11 kg/m 2 1.85 m 2 Preferred Pharmacy Pharmacy Name Phone [id number] Loi Garcia, [address] [place] [address] [phone] Your Updated Medication List  
  
   
This list is accurate as of 9/6/18  2:52 PM.  Always use your most recent med list. amLODIPine 10 mg tablet Commonly known as:  Sal Chafe TAKE 1 TABLET BY MOUTH EVERY MORNING  
  
 BC HEADACHE POWDER PO Take 1 Packet by mouth three (3) times daily as needed (back pain). brimonidine 0.15 % ophthalmic solution Commonly known as:  Cherelle Dinh Administer 1 Drop to both eyes two (2) times a day. clopidogrel 75 mg Tab Commonly known as:  PLAVIX TAKE 1 TAB BY MOUTH DAILY. fenofibrate nanocrystallized 145 mg tablet Commonly known as:  TRICOR  
TAKE 1 TABLET BY MOUTH EVERY DAY  
  
 insulin detemir U-100 100 unit/mL (3 mL) Inpn Commonly known as:  LEVEMIR FLEXTOUCH  
30 units every morning3  
  
 levothyroxine 150 mcg tablet Commonly known as:  SYNTHROID  
TAKE 1 TABLET BY MOUTH EVERY DAY  
  
 losartan-hydroCHLOROthiazide 100-25 mg per tablet Commonly known as:  HYZAAR  
TAKE 1 TABLET BY MOUTH DAILY pravastatin 80 mg tablet Commonly known as:  PRAVACHOL Take 80 mg by mouth daily. Introducing hospitals & HEALTH SERVICES!    
 Sonia Soriano introduces [employer] patient portal. Now you can access parts of your medical record, email your doctor's office, and request medication refills online. 1. In your internet browser, go to [email or web address] 2. Click on the First Time User? Click Here link in the Sign In box. You will see the New Member Sign Up page. 3. Enter your [employer] Access Code exactly as it appears below. You will not need to use this code after youve completed the sign-up process. If you do not sign up before the expiration date, you must request a new code. · [employer] Access Code: [email or web address] Expires: 10/8/2018  5:25 PM 
 
4. Enter the last four digits of your Social Security Number (xxxx) and Date of Birth (mm/dd/yyyy) as indicated and click Submit. You will be taken to the next sign-up page. 5. Create a [employer] ID. This will be your [employer] login ID and cannot be changed, so think of one that is secure and easy to remember. 6. Create a [employer] password. You can change your password at any time. 7. Enter your Password Reset Question and Answer. This can be used at a later time if you forget your password. 8. Enter your e-mail address. You will receive e-mail notification when new information is available in [address] 9. Click Sign Up. You can now view and download portions of your medical record. 10. Click the Download Summary menu link to download a portable copy of your medical information. If you have questions, please visit the Frequently Asked Questions section of the [employer] website. Remember, [employer] is NOT to be used for urgent needs. For medical emergencies, dial 911. Now available from your iPhone and Android! Please provide this summary of care documentation to your next provider. Your primary care clinician is listed as William Doll. If you have any questions after today's visit, please call [phone].

## 2018-09-06 NOTE — PROGRESS NOTES
Chief Complaint   Patient presents with    Transitions Of Care       1. Have you been to the ER, urgent care clinic since your last visit? Hospitalized since your last visit? [facility]    2. Have you seen or consulted any other health care providers outside of the [address] [place] [address] since your last visit? Include any pap smears or colon screening. No    Body mass index is 30.11 kg/(m^2).

## 2018-09-06 NOTE — PROGRESS NOTES
[address] [facility]  [place] [address]  [address] [place] [address]  Phone:  [phone]  Fax: [phone]       Chief Complaint   Patient presents with    Transitions Of Care   . SUBJECTIVE:    Avery Painter is a 69 y.o. female Comes in for return visit since her discharge from the hospital.  She was hospitalized for recurrent hypoglycemic reactions. Interestingly during her hospital stay, blood sugars remained in the 200 and 300s during the daytime but from 12 midnight to 8 a.m.  the next morning would drop to the low 30sor 40s. She was not getting any other insulin other than the degludec in the morning. She was extremely sensitive to short-acting insulin. It was also evident that she had hypoglycemic unawareness. Her base problem as relates her diabetes is her defect in short-term memory. She readily admits this today. I had observed this before and felt this was a major problem. Her son lives with her and assists with her overall care. She is past history of primary hypertension and dyslipidemia. Current Outpatient Prescriptions   Medication Sig Dispense Refill    insulin detemir U-100 (LEVEMIR FLEXTOUCH) 100 unit/mL (3 mL) inpn 30 units every morning3 3 Adjustable Dose Pre-filled Pen Syringe 11    pravastatin (PRAVACHOL) 80 mg tablet Take 80 mg by mouth daily.  aspirin/salicylamide/caffeine (BC HEADACHE POWDER PO) Take 1 Packet by mouth three (3) times daily as needed (back pain).  fenofibrate nanocrystallized (TRICOR) 145 mg tablet TAKE 1 TABLET BY MOUTH EVERY DAY 30 Tab 11    levothyroxine (SYNTHROID) 150 mcg tablet TAKE 1 TABLET BY MOUTH EVERY DAY 30 Tab 11    clopidogrel (PLAVIX) 75 mg tab TAKE 1 TAB BY MOUTH DAILY.  30 Tab 11    amLODIPine (NORVASC) 10 mg tablet TAKE 1 TABLET BY MOUTH EVERY MORNING 90 Tab 3    losartan-hydroCHLOROthiazide (HYZAAR) 100-25 mg per tablet TAKE 1 TABLET BY MOUTH DAILY 30 Tab 11    brimonidine (ALPHAGAN) 0.15 % ophthalmic solution Administer 1 Drop to both eyes two (2) times a day.        Past Medical History:   Diagnosis Date    [facility]     Heart failure ([facility]     unknown to family    Hypercholesteremia     Hypertension     [facility]     Thyroid disease      Past Surgical History:   Procedure Laterality Date    HX GYN      HX HEENT      thyroidectomy    HX HYSTERECTOMY      REMOVAL GALLBLADDER      THYROIDECTOMY       No Known Allergies      REVIEW OF SYSTEMS:  General: negative for - chills or fever  ENT: negative for - headaches, nasal congestion or tinnitus  Respiratory: negative for - cough, hemoptysis, shortness of breath or wheezing  Cardiovascular : negative for - chest pain, edema, palpitations or shortness of breath  Gastrointestinal: negative for - abdominal pain, blood in stools, heartburn or nausea/vomiting  Genito-Urinary: no dysuria, trouble voiding, or hematuria  Musculoskeletal: negative for - gait disturbance, joint pain, joint stiffness or joint swelling  Neurological: no TIA or stroke symptoms  Hematologic: no bruises, no bleeding, no swollen glands  Integument: no lumps, mole changes, nail changes or rash  Endocrine: no malaise/lethargy or unexpected weight changes      Social History     Social History    Marital status:      Spouse name: N/A    Number of children: 1    Years of education: N/A     Occupational History    retired      Social History Main Topics    Smoking status: Never Smoker    Smokeless tobacco: Never Used    Alcohol use No      Comment: been years    Drug use: No    Sexual activity: Not Currently     Other Topics Concern    None     Social History Narrative     Family History   Problem Relation Age of Onset    Diabetes Father     No Known Problems Mother        OBJECTIVE:    Visit Vitals    /62 (BP 1 Location: Left arm, BP Patient Position: Sitting)    Pulse 76    Temp 98 °F (36.7 °C) (Oral)    Resp 19    Ht 5' 3\" (1.6 m)    Wt 170 lb (77.1 kg)    SpO2 100%    BMI 30.11 kg/m2     CONSTITUTIONAL: well , well nourished, appears age appropriate  EYES: perrla, eom intact  ENMT:moist mucous membranes, pharynx clear  NECK: supple. Thyroid normal  RESPIRATORY: Chest: clear to ascultation and percussion   CARDIOVASCULAR: Heart: regular rate and rhythm  GASTROINTESTINAL: Abdomen: soft, bowel sounds active  HEMATOLOGIC: no pathological lymph nodes palpated  MUSCULOSKELETAL: Extremities: no edema, pulse 1+   INTEGUMENT: No unusual rashes or suspicious skin lesions noted. Nails appear normal.  NEUROLOGIC: non-focal exam   MENTAL STATUS: alert and oriented, appropriate affect      ASSESSMENT:  1. Type 2 diabetes mellitus without complication, with long-term current use of insulin ([address]    2. 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 Essential hypertension    4. Dyslipidemia    5. Hypothyroidism due to medication    6. Memory deficit        PLAN:  1. Patient is reminded to check blood sugars a.c. and at hour of sleep and to make sure she eats all meal and especially a bedtime snack. Bedtime snack should consist of at least a protein and cheese. She has a logbook now and will write all blood sugars down. 2. She did not get the Levemir that I called in for her to replace the degludec. 3. She does have hypoglycemic unawareness. 4. Blood pressure is excellent today. No adjustments are made. 5. She will continue statin as prescribed in view of her significantly increase cardiovascular risk. 6. She is also on a thyroid supplement. Her last TSH was excellent. 7. She has a problem with her short-term memory. This creates a significant problem with her diabetic compliance. .  Orders Placed This Encounter    AMB POC GLUCOSE BLOOD, BY GLUCOSE MONITORING DEVICE         Follow-up Disposition:  Return in about 3 weeks (around 9/27/2018).       Darya Jurado MD

## 2018-09-09 ENCOUNTER — HOSPITAL ENCOUNTER (EMERGENCY)
Age: 78
Discharge: HOME | End: 2018-09-09
Attending: EMERGENCY MEDICINE
Payer: MEDICARE

## 2018-09-09 VITALS
HEIGHT: 63 IN | TEMPERATURE: 97.7 F | DIASTOLIC BLOOD PRESSURE: 74 MMHG | HEART RATE: 71 BPM | RESPIRATION RATE: 19 BRPM | OXYGEN SATURATION: 100 % | BODY MASS INDEX: 30.12 KG/M2 | SYSTOLIC BLOOD PRESSURE: 141 MMHG | WEIGHT: 170 LBS

## 2018-09-09 DIAGNOSIS — E16.2 HYPOGLYCEMIA: Primary | ICD-10-CM

## 2018-09-09 DIAGNOSIS — E11.9 TYPE 2 DIABETES MELLITUS WITHOUT COMPLICATION, WITH LONG-TERM CURRENT USE OF INSULIN (HCC): ICD-10-CM

## 2018-09-09 DIAGNOSIS — Z79.4 TYPE 2 DIABETES MELLITUS WITHOUT COMPLICATION, WITH LONG-TERM CURRENT USE OF INSULIN (HCC): ICD-10-CM

## 2018-09-09 LAB
ANION GAP SERPL CALC-SCNC: 6 MMOL/L (ref 5–15)
APPEARANCE UR: ABNORMAL
ATRIAL RATE: 81 BPM
BACTERIA URNS QL MICRO: NEGATIVE /HPF
BASOPHILS # BLD: 0 K/UL (ref 0–0.1)
BASOPHILS NFR BLD: 0 % (ref 0–1)
BILIRUB UR QL: NEGATIVE
BUN SERPL-MCNC: 21 MG/DL (ref 6–20)
BUN/CREAT SERPL: 36 (ref 12–20)
CALCIUM SERPL-MCNC: 8.8 MG/DL (ref 8.5–10.1)
CALCULATED P AXIS, ECG09: 33 DEGREES
CALCULATED R AXIS, ECG10: -28 DEGREES
CALCULATED T AXIS, ECG11: -7 DEGREES
CHLORIDE SERPL-SCNC: 106 MMOL/L (ref 97–108)
CO2 SERPL-SCNC: 28 MMOL/L (ref 21–32)
COLOR UR: ABNORMAL
CREAT SERPL-MCNC: 0.59 MG/DL (ref 0.55–1.02)
DIAGNOSIS, 93000: NORMAL
DIFFERENTIAL METHOD BLD: ABNORMAL
EOSINOPHIL # BLD: 0.1 K/UL (ref 0–0.4)
EOSINOPHIL NFR BLD: 1 % (ref 0–7)
EPITH CASTS URNS QL MICRO: ABNORMAL /LPF
ERYTHROCYTE [DISTWIDTH] IN BLOOD BY AUTOMATED COUNT: 12.9 % (ref 11.5–14.5)
GLUCOSE BLD STRIP.AUTO-MCNC: 100 MG/DL (ref 65–100)
GLUCOSE BLD STRIP.AUTO-MCNC: 148 MG/DL (ref 65–100)
GLUCOSE BLD STRIP.AUTO-MCNC: 155 MG/DL (ref 65–100)
GLUCOSE SERPL-MCNC: 165 MG/DL (ref 65–100)
GLUCOSE UR STRIP.AUTO-MCNC: 250 MG/DL
HCT VFR BLD AUTO: 34.1 % (ref 35–47)
HGB BLD-MCNC: 11.7 G/DL (ref 11.5–16)
HGB UR QL STRIP: NEGATIVE
HYALINE CASTS URNS QL MICRO: ABNORMAL /LPF (ref 0–5)
IMM GRANULOCYTES # BLD: 0 K/UL (ref 0–0.04)
IMM GRANULOCYTES NFR BLD AUTO: 0 % (ref 0–0.5)
KETONES UR QL STRIP.AUTO: NEGATIVE MG/DL
LEUKOCYTE ESTERASE UR QL STRIP.AUTO: NEGATIVE
LYMPHOCYTES # BLD: 0.7 K/UL (ref 0.8–3.5)
LYMPHOCYTES NFR BLD: 7 % (ref 12–49)
MCH RBC QN AUTO: 32.5 PG (ref 26–34)
MCHC RBC AUTO-ENTMCNC: 34.3 G/DL (ref 30–36.5)
MCV RBC AUTO: 94.7 FL (ref 80–99)
MONOCYTES # BLD: 0.6 K/UL (ref 0–1)
MONOCYTES NFR BLD: 6 % (ref 5–13)
NEUTS SEG # BLD: 8.2 K/UL (ref 1.8–8)
NEUTS SEG NFR BLD: 86 % (ref 32–75)
NITRITE UR QL STRIP.AUTO: NEGATIVE
NRBC # BLD: 0 K/UL (ref 0–0.01)
NRBC BLD-RTO: 0 PER 100 WBC
P-R INTERVAL, ECG05: 154 MS
PH UR STRIP: 7.5 [PH] (ref 5–8)
PLATELET # BLD AUTO: 269 K/UL (ref 150–400)
PMV BLD AUTO: 9.3 FL (ref 8.9–12.9)
POTASSIUM SERPL-SCNC: 3.4 MMOL/L (ref 3.5–5.1)
PROT UR STRIP-MCNC: NEGATIVE MG/DL
Q-T INTERVAL, ECG07: 366 MS
QRS DURATION, ECG06: 94 MS
QTC CALCULATION (BEZET), ECG08: 425 MS
RBC # BLD AUTO: 3.6 M/UL (ref 3.8–5.2)
RBC #/AREA URNS HPF: ABNORMAL /HPF (ref 0–5)
RBC MORPH BLD: ABNORMAL
SERVICE CMNT-IMP: ABNORMAL
SERVICE CMNT-IMP: ABNORMAL
SERVICE CMNT-IMP: NORMAL
SODIUM SERPL-SCNC: 140 MMOL/L (ref 136–145)
SP GR UR REFRACTOMETRY: 1.01 (ref 1–1.03)
UA: UC IF INDICATED,UAUC: ABNORMAL
UROBILINOGEN UR QL STRIP.AUTO: 0.2 EU/DL (ref 0.2–1)
VENTRICULAR RATE, ECG03: 81 BPM
WBC # BLD AUTO: 9.6 K/UL (ref 3.6–11)
WBC URNS QL MICRO: ABNORMAL /HPF (ref 0–4)

## 2018-09-09 PROCEDURE — 81001 URINALYSIS AUTO W/SCOPE: CPT | Performed by: EMERGENCY MEDICINE

## 2018-09-09 PROCEDURE — 93005 ELECTROCARDIOGRAM TRACING: CPT

## 2018-09-09 PROCEDURE — 82962 GLUCOSE BLOOD TEST: CPT

## 2018-09-09 PROCEDURE — 99285 EMERGENCY DEPT VISIT HI MDM: CPT

## 2018-09-09 PROCEDURE — 80048 BASIC METABOLIC PNL TOTAL CA: CPT | Performed by: EMERGENCY MEDICINE

## 2018-09-09 PROCEDURE — 85025 COMPLETE CBC W/AUTO DIFF WBC: CPT | Performed by: EMERGENCY MEDICINE

## 2018-09-09 PROCEDURE — 36415 COLL VENOUS BLD VENIPUNCTURE: CPT | Performed by: EMERGENCY MEDICINE

## 2018-09-09 NOTE — ED NOTES
Meal tray arrived. Pt now eating. Discharge instructions reviewed with pt by Dr. Sandra Fajardo. pt able to return/verbalize discharge instructions. Copy of discharge instructions given. Patient condition stable, respiratory status within normal limits, neuro status intact.

## 2018-09-09 NOTE — ED NOTES
Assumed care of patient. Bedside shift change report received from Noble Loo (offgoing nurse) by Monika Chase (oncoming nurse). Given using SBAR, ED summary, MAR and recent results.

## 2018-09-09 NOTE — ED NOTES
Assumed care of pt. From EMS. Pt. Presents to the ED with chief complaint of hypoglycemia. EMS reports picking pt up from home with an initial BG of 23. EMS reports attempting 4 IV's en route unsuccessfully. EMS reports giving 1mg IM glucagon. Repeat sugar was 32 so they gave 15mg Oral Glucose Tabs. Pt's GCS en route 6-7. Pt's GCS upon arrival 16. Pt. Is A/O x 4. EMS reports that pt is non-compliant with her diabetes medications and checking her sugars regurarly and report they were at her house for the same thing yesterday morning, were able to obtain an IV, give D50 and she refused transport then. Pt's son with mental disabilities lives at home with the pt and called EMS this morning. Pt reports on arrival to the ED when asked why she is here that, \"I think my blood sugar is high. \" Pt. Denies any other symptoms at this time. Pt. Resting comfortably on the stretcher in a position of comfort. Pt. In no acute distress at this time. Call bell within reach. Side rails x 2. Cardiac monitor x 3. Stretcher locked in the lowest position. Pt. Aware of plan to await for MD/NATALIE/NP assessment, and patient/family verbalizes understanding. Will continue to monitor.

## 2018-09-09 NOTE — ED PROVIDER NOTES
EMERGENCY DEPARTMENT HISTORY AND PHYSICAL EXAM 
 
 
Date: 9/9/2018 Patient Name: Jan Santos History of Presenting Illness Chief Complaint Patient presents with  Low Blood Sugar Pt presents to the ED via EMS for low blood sugar. EMS reports that her initial BG was 23. EMS attempted 4 IV's unsuccessfully and gave 1mg IM glucagon with a repeat sugar of 32 and then 15g oral glucose. Pt A/O x 4 at this time on arrival to the ED. History Provided By: Patient and EMS 
 
HPI: Jan Santos, 69 y.o. female with PMHx significant for DM, HTN, TIA, heart failure, presents via EMS to the ED with cc of gradual onset hypoglycemia, onset several hours pta, due to not eating before going to bed, instructions given to her from her PCP. Pt reports was using [place] insulin in past, but currently is now taking [place] insulin. Pt reports last taking L[place] morning of 09/08/2018, and denies taking medication throughout rest of the day. Pt reports difficulty controlling blood sugar during day. EMS reports pt's blood sugar level was 148 en route to ED. Pt denies EtOH and general body pain. There are no other complaints, changes, or physical findings at this time. PCP: Lynda Goodwin MD 
 
Current Outpatient Prescriptions Medication Sig Dispense Refill  glucagon (GLUCAGEN) 1 mg injection 1 mL by IntraMUSCular route once for 1 dose. 1 Vial 0  
 insulin detemir U-100 (LEVEMIR FLEXTOUCH) 100 unit/mL (3 mL) inpn 30 units every morning3 3 Adjustable Dose Pre-filled Pen Syringe 11  
 pravastatin (PRAVACHOL) 80 mg tablet Take 80 mg by mouth daily.  aspirin/salicylamide/caffeine (BC HEADACHE POWDER PO) Take 1 Packet by mouth three (3) times daily as needed (back pain).     
 fenofibrate nanocrystallized (TRICOR) 145 mg tablet TAKE 1 TABLET BY MOUTH EVERY DAY 30 Tab 11  
 levothyroxine (SYNTHROID) 150 mcg tablet TAKE 1 TABLET BY MOUTH EVERY DAY 30 Tab 11  
  clopidogrel (PLAVIX) 75 mg tab TAKE 1 TAB BY MOUTH DAILY. 30 Tab 11  
 amLODIPine (NORVASC) 10 mg tablet TAKE 1 TABLET BY MOUTH EVERY MORNING 90 Tab 3  
 losartan-hydroCHLOROthiazide (HYZAAR) 100-25 mg per tablet TAKE 1 TABLET BY MOUTH DAILY 30 Tab 11  
 brimonidine (ALPHAGAN) 0.15 % ophthalmic solution Administer 1 Drop to both eyes two (2) times a day. Past History Past Medical History: 
Past Medical History:  
Diagnosis Date  [facility] ([facility]  Heart failure ([facility]   
 unknown to family  Hypercholesteremia  Hypertension  Stroke ([facility]  Thyroid disease Past Surgical History: 
Past Surgical History:  
Procedure Laterality Date  HX GYN    
 HX HEENT    
 thyroidectomy  HX HYSTERECTOMY  REMOVAL GALLBLADDER    
 THYROIDECTOMY Family History: 
Family History Problem Relation Age of Onset  Diabetes Father  No Known Problems Mother Social History: 
Social History Substance Use Topics  Smoking status: Never Smoker  Smokeless tobacco: Never Used  Alcohol use No  
   Comment: been years Allergies: 
No Known Allergies Review of Systems Review of Systems Constitutional: Negative for chills and fever. HENT: Negative for congestion and sore throat. Eyes: Negative for visual disturbance. Respiratory: Negative for cough and shortness of breath. Cardiovascular: Negative for chest pain and leg swelling. Gastrointestinal: Negative for abdominal pain, blood in stool, diarrhea and nausea. Endocrine: Negative for polyuria. Genitourinary: Negative for dysuria, flank pain, vaginal bleeding and vaginal discharge. Musculoskeletal: Negative for myalgias. Skin: Negative for rash. Allergic/Immunologic: Negative for immunocompromised state. Neurological: Negative for weakness and headaches. Psychiatric/Behavioral: Negative for confusion.   
 
 
Physical Exam  
Physical Exam  
 Constitutional: She is oriented to person, place, and time. She appears well-developed and well-nourished. HENT:  
Head: Normocephalic and atraumatic. Moist mucous membranes Eyes: Conjunctivae are normal. Pupils are equal, round, and reactive to light. Right eye exhibits no discharge. Left eye exhibits no discharge. Neck: Normal range of motion. Neck supple. No tracheal deviation present. Cardiovascular: Normal rate, regular rhythm and normal heart sounds. No murmur heard. Pulmonary/Chest: Effort normal and breath sounds normal. No respiratory distress. She has no wheezes. She has no rales. Abdominal: Soft. Bowel sounds are normal. There is no tenderness. There is no rebound and no guarding. Musculoskeletal: Normal range of motion. She exhibits no edema, tenderness or deformity. Neurological: She is alert and oriented to person, place, and time. Skin: Skin is warm and dry. No rash noted. No erythema. Psychiatric: Her behavior is normal.  
Nursing note and vitals reviewed. Diagnostic Study Results Labs - Recent Results (from the past 12 hour(s)) GLUCOSE, POC Collection Time: 09/09/18  5:24 AM  
Result Value Ref Range Glucose (POC) 148 (H) 65 - 100 mg/dL Performed by [employer] EKG, 12 LEAD, INITIAL Collection Time: 09/09/18  5:56 AM  
Result Value Ref Range Ventricular Rate 81 BPM  
 Atrial Rate 81 BPM  
 P-R Interval 154 ms QRS Duration 94 ms Q-T Interval 366 ms  
 QTC Calculation (Bezet) 425 ms Calculated P Axis 33 degrees Calculated R Axis -28 degrees Calculated T Axis -7 degrees Diagnosis Normal sinus rhythm Cannot rule out Anterior infarct , age undetermined When compared with ECG of 02-JUN-2017 13:47, 
ST no longer depressed in Lateral leads Nonspecific T wave abnormality no longer evident in Anterior leads CBC WITH AUTOMATED DIFF Collection Time: 09/09/18  6:14 AM  
Result Value Ref Range WBC 9.6 3.6 - 11.0 K/uL  
 RBC 3.60 (L) 3.80 - 5.20 M/uL  
 HGB 11.7 11.5 - 16.0 g/dL HCT 34.1 (L) 35.0 - 47.0 % MCV 94.7 80.0 - 99.0 FL  
 MCH 32.5 26.0 - 34.0 PG  
 MCHC 34.3 30.0 - 36.5 g/dL  
 RDW 12.9 11.5 - 14.5 % PLATELET 438 228 - 402 K/uL MPV 9.3 8.9 - 12.9 FL  
 NRBC 0.0 0  WBC ABSOLUTE NRBC 0.00 0.00 - 0.01 K/uL NEUTROPHILS 86 (H) 32 - 75 % LYMPHOCYTES 7 (L) 12 - 49 % MONOCYTES 6 5 - 13 % EOSINOPHILS 1 0 - 7 % BASOPHILS 0 0 - 1 % IMMATURE GRANULOCYTES 0 0.0 - 0.5 % ABS. NEUTROPHILS 8.2 (H) 1.8 - 8.0 K/UL  
 ABS. LYMPHOCYTES 0.7 (L) 0.8 - 3.5 K/UL  
 ABS. MONOCYTES 0.6 0.0 - 1.0 K/UL  
 ABS. EOSINOPHILS 0.1 0.0 - 0.4 K/UL  
 ABS. BASOPHILS 0.0 0.0 - 0.1 K/UL  
 ABS. IMM. GRANS. 0.0 0.00 - 0.04 K/UL  
 DF AUTOMATED    
 RBC COMMENTS NORMOCYTIC, NORMOCHROMIC METABOLIC PANEL, BASIC Collection Time: 09/09/18  6:14 AM  
Result Value Ref Range Sodium 140 136 - 145 mmol/L Potassium 3.4 (L) 3.5 - 5.1 mmol/L Chloride 106 97 - 108 mmol/L  
 CO2 28 21 - 32 mmol/L Anion gap 6 5 - 15 mmol/L Glucose 165 (H) 65 - 100 mg/dL BUN 21 (H) 6 - 20 MG/DL Creatinine 0.59 0.55 - 1.02 MG/DL  
 BUN/Creatinine ratio 36 (H) 12 - 20 GFR est AA >60 >60 ml/min/1.73m2 GFR est non-AA >60 >60 ml/min/1.73m2 Calcium 8.8 8.5 - 10.1 MG/DL URINALYSIS W/ REFLEX CULTURE Collection Time: 09/09/18  6:22 AM  
Result Value Ref Range Color YELLOW/STRAW Appearance CLOUDY (A) CLEAR Specific gravity 1.012 1.003 - 1.030    
 pH (UA) 7.5 5.0 - 8.0 Protein NEGATIVE  NEG mg/dL Glucose 250 (A) NEG mg/dL Ketone NEGATIVE  NEG mg/dL Bilirubin NEGATIVE  NEG Blood NEGATIVE  NEG Urobilinogen 0.2 0.2 - 1.0 EU/dL Nitrites NEGATIVE  NEG Leukocyte Esterase NEGATIVE  NEG    
 WBC 0-4 0 - 4 /hpf  
 RBC 0-5 0 - 5 /hpf Epithelial cells FEW FEW /lpf  Bacteria NEGATIVE  NEG /hpf  
 UA: UC IF INDICATED CULTURE NOT INDICATED BY UA RESULT CNI Hyaline cast 0-2 0 - 5 /lpf  
GLUCOSE, POC Collection Time: 09/09/18  7:12 AM  
Result Value Ref Range Glucose (POC) 155 (H) 65 - 100 mg/dL Performed by Carroll Solis GLUCOSE, POC Collection Time: 09/09/18  8:39 AM  
Result Value Ref Range Glucose (POC) 100 65 - 100 mg/dL Performed by [facility] Medical Decision Making I am the first provider for this patient. I reviewed the vital signs, available nursing notes, past medical history, past surgical history, family history and social history. Vital Signs-Reviewed the patient's vital signs. Patient Vitals for the past 12 hrs: 
 Temp Pulse Resp BP SpO2  
09/09/18 0800 - 73 18 152/61 99 % 09/09/18 0715 - 70 18 118/76 99 % 09/09/18 0700 - 73 22 137/56 98 % 09/09/18 0645 - 73 21 137/53 98 % 09/09/18 0630 - 77 19 133/73 99 % 09/09/18 0618 - 88 19 - 97 % 09/09/18 0615 - 79 21 142/58 -  
09/09/18 0600 - 83 18 162/62 99 % 09/09/18 0545 - 83 17 146/64 99 % 09/09/18 0530 - - - 167/63 100 % 09/09/18 0529 97.7 °F (36.5 °C) 90 18 137/63 99 % 09/09/18 0527 - - - 156/56 -  
 
 
Pulse Oximetry Analysis - 99% on RA Cardiac Monitor:  
Rate: 90 bpm 
Rhythm: Normal Sinus Rhythm 0529 EKG interpretation: (Preliminary) 6533 Rhythm: normal sinus rhythm; and regular . Rate (approx.): 81; Axis: normal; MI interval: normal; QRS interval: 94; QT/QTc: 366/425; ST/T wave: normal; Other findings: no acute ischemic changes. Written by CARLOS Shaffer, as dictated by Lisa Alcaraz DO. Records Reviewed: Nursing Notes and Old Medical Records Provider Notes (Medical Decision Making):  
69 y.o. brutal diabetic, recently hospitalized for recurrent hypoglycemia. After thorough work up, thought to be due to decrease in PO intake. Pt reports not eating dinner last night and no snack before bed instructed to do to avoid hypoglycemia. ED Course: Initial assessment performed. The patients presenting problems have been discussed, and they are in agreement with the care plan formulated and outlined with them. I have encouraged them to ask questions as they arise throughout their visit. SIGN OUT: 
7:23 AM 
Patient's presentation, labs/imaging and plan of care was reviewed with Michelle Savage MD as part of sign out. They will give pt time to eat and recheck blood sugar levels as part of the plan discussed with the patient. Michelle Savage MD's assistance in completion of this plan is greatly appreciated but it should be noted that I will be the provider of record for this patient. Beth Rodrigues, DO 
 
PROGRESS NOTE:  
8:44 AM 
Pt has been re-examined by Michelle Savage MD. Pt is currently tolerating PO. Will discharge pt soon. Critical Care Time:  
0 minutes Discharge Note: 
8:50 AM 
The pt is ready for discharge. The pt's signs, symptoms, diagnosis, and discharge instructions have been discussed and pt has conveyed their understanding. The pt is to follow up as recommended or return to ER should their symptoms worsen. Plan has been discussed and pt is in agreement. PLAN: 
1. Current Discharge Medication List  
  
START taking these medications Details  
glucagon (GLUCAGEN) 1 mg injection 1 mL by IntraMUSCular route once for 1 dose. Qty: 1 Vial, Refills: 0  
  
  
 
2. Follow-up Information Follow up With Details Comments Contact Info Mitchell Lyles MD Schedule an appointment as soon as possible for a visit  [address] [address] [place] [address] 
[phone] [facility] EMERGENCY DEPT  If symptoms worsen [address] [facility] 
[phone] Return to ED if worse Diagnosis Clinical Impression: 1. Hypoglycemia 2. Type 2 diabetes mellitus without complication, with long-term current use of insulin ([facility] Attestations: This note is prepared by Rhianna Hickey, acting as Scribe for [employer], DO. [employer], DO: The scribe's documentation has been prepared under my direction and personally reviewed by me in its entirety. I confirm that the note above accurately reflects all work, treatment, procedures, and medical decision making performed by me.

## 2018-09-09 NOTE — DISCHARGE INSTRUCTIONS
Learning About Low Blood Sugar (Hypoglycemia) in Diabetes  What is low blood sugar (hypoglycemia)? Hypoglycemia means that your blood sugar is low and your body (especially your brain) is not getting enough fuel. If you have diabetes, your blood sugar can go too low if you take too much of some diabetes medicines. It can also go too low if you miss a meal. And it can happen if you exercise too hard without eating enough food. Some medicines used to treat other health problems can cause low blood sugar too. What are the symptoms? Symptoms of low blood sugar can start quickly. It may take just 10 to 15 minutes. If you have had diabetes for many years, you may not realize that your blood sugar is low until it drops very low. · If your blood sugar level drops below 70 (mild low blood sugar), you may feel tired, anxious, dizzy, weak, shaky, or sweaty. You may have a fast heartbeat or blurry vision. · If your blood sugar level continues to drop (usually below 40), your behavior may change. You may feel more irritable. You may find it hard to concentrate or talk. And you may feel unsteady when you stand or walk. You may become too weak or confused to eat something with sugar to raise your blood sugar level. · If your blood sugar level drops very low (usually below 20), you may pass out (lose consciousness). Or you may have a seizure or stroke. If you have symptoms of severe low blood sugar, you need to get medical care right away. If you had a low blood sugar level during the night, you may wake up tired or with a headache. Or you may sweat so much during the night that your pajamas or sheets are damp when you wake up. How is low blood sugar treated? You can treat low blood sugar by eating or drinking something that has 15 grams of carbohydrate. These should be quick-sugar foods.  Check your blood sugar level again 15 minutes after having a quick-sugar food to make sure your level is getting back to your target range. Here are examples of quick-sugar foods that have 15 grams of carbohydrate:  · 3 to 4 glucose tablets  · 1 tube of glucose gel  · Hard candy (such as 3 Jolly Ranchers or 5 to 7 Life Savers)  · 1 tablespoon honey  · 2 tablespoons of raisins  · ½ cup to ¾ cup (4 to 6 ounces) of fruit juice or regular (not diet) soda  · 1 tablespoon of sugar  · 1 cup of fat-free milk  If you have problems with severe low blood sugar, someone else may have to give you a shot of glucagon. This is a hormone that raises blood sugar levels quickly. How can you prevent low blood sugar? You can take steps to prevent low blood sugar. · Follow your treatment plan. Take your insulin or other diabetes medicine exactly as your doctor prescribed it. Talk with your doctor if you're having low blood sugar often. Your medicine may need to be adjusted if it's causing your low blood sugar. · Check your blood sugar levels often. This helps you find early changes before an emergency happens. · Keep a quick-sugar food with you in case your blood sugar level drops low. · Eat small meals more often so that you don't get too hungry between meals. Don't skip meals. · Balance extra exercise with eating more. Check your blood sugar and learn how it changes after exercise. If your blood sugar stays at a normal level, you may not need to eat after you exercise. · Limit how much alcohol you drink. Alcohol can make low blood sugar go even lower. Don't drink alcohol if you have problems recognizing the early signs of low blood sugar. · Keep a diary of your symptoms. This helps you learn when changes in your body may signal low blood sugar. And keep track of how often you have low blood sugar, including when you last ate and what you ate. This will help you learn what causes your blood sugar to drop. · Learn about diabetes and low blood sugar.  Support groups or a diabetes education center can help you understand how medicines, diet, and exercise affect your blood sugar levels. Since low blood sugar levels can quickly become an emergency, be sure to wear medical alert jewelry, such as a medical alert bracelet. This is to let people know you have diabetes so they can get help for you. You can buy this at most drugstores. And make sure your family, friends, and coworkers know the symptoms of low blood sugar. Teach them what to do to get your sugar level up. Follow-up care is a key part of your treatment and safety. Be sure to make and go to all appointments, and call your doctor if you are having problems. It's also a good idea to know your test results and keep a list of the medicines you take. Where can you learn more? Go to http://toi-sunil.info/. Enter X356 in the search box to learn more about \"Learning About Low Blood Sugar (Hypoglycemia) in Diabetes. \"  Current as of: December 7, 2017  Content Version: 11.7  © 3357-1145 [employer]. Care instructions adapted under license by [employer] (which disclaims liability or warranty for this information). If you have questions about a medical condition or this instruction, always ask your healthcare professional. [address] any warranty or liability for your use of this information. YOU HAVE TO EAT DINNER AND EAT A SNACK BEFORE BED. IF YOUR BLOOD SUGAR DROPS TOO LOW YOU COULD DIE! How to Give a Glucagon Shot: Care Instructions  What is a glucagon shot? Glucagon is a hormone that raises blood sugar levels. It is made by the pancreas. It is also available in a low blood sugar emergency kit. People with diabetes sometimes get a very low blood sugar. A glucagon shot raises a person's blood sugar level quickly. A person needs a glucagon shot if he or she has a very low blood sugar and is unconscious. A person also needs a shot if he or she can't or won't drink or eat something that contains sugar.   If someone close to you has diabetes, you may need to give the person a shot of glucagon during a low blood sugar emergency. How do you give the glucagon shot? A glucagon emergency kit has a syringe that contains liquid. The kit also has a bottle that contains the medicine, which is a powder. 1. Follow the instructions in the kit to mix the powder and the liquid. Put this back into the syringe. Make sure you have the amount of glucagon that the person's doctor recommends. 2. Choose a clean site for the shot on the buttock, upper arm, or thigh. If you have an alcohol swab, use it to clean the skin where you will give the shot. 3. Keep your fingers off the plunger, and hold the syringe like a pencil close to the site. 4. Quickly push the needle all the way into the site. 5. Push the plunger all the way in so that the medicine goes into the tissue. Remove the needle from the skin slowly and at the same angle that you put it in. Press the alcohol swab, if you used one, against the shot site. 6. Turn the person's head to the side to prevent choking if he or she vomits. 7. After you give the shot, immediately call 911 or other emergency services. If help has not arrived within 15 minutes and the person is still unconscious, give another glucagon shot. 8. Give some glucose or sucrose tablets or quick-sugar food when the person is alert and able to swallow. Also give the person some long-acting source of carbohydrate, such as crackers and cheese or a meat sandwich. Stay with the person until emergency help arrives. Any time a person who has diabetes gets glucagon, he or she should talk to a doctor to try to find out what caused the low blood sugar. Possible causes include too much insulin, a missed meal, or insulin injected into a blood vessel. Other causes can be an illness other than diabetes, liver or kidney damage, a new medicine, or exercise. Where can you learn more?   Go to http://toi-sunil.info/. Enter S190 in the search box to learn more about \"How to Give a Glucagon Shot: Care Instructions. \"  Current as of: December 7, 2017  Content Version: 11.7  © 7069-3375 [employer]. Care instructions adapted under license by [employer] (which disclaims liability or warranty for this information). If you have questions about a medical condition or this instruction, always ask your healthcare professional. [address] any warranty or liability for your use of this information.

## 2018-09-09 NOTE — ED NOTES
Spoke with dietary regarding meal tray that was ordered at [address] They state meal tray will be here around 0830 unless it is needed now. This RN requesting meal tray to arrive now and they will be sending it shortly

## 2018-09-09 NOTE — ED NOTES
Bedside and verbal report given to Leigh Nick RN on [employer] at shift change for routine progression of care. Report consisted of patients Situation, Background, Assessment and Recommendations(SBAR). Information from the following report(s) SBAR, Kardex, ED Summary, [facility] and Recent Results was reviewed with the receiving nurse. Opportunity for questions and clarification was provided.

## 2018-10-03 ENCOUNTER — HOSPITAL ENCOUNTER (INPATIENT)
Age: 78
LOS: 6 days | Discharge: HOME | DRG: 637 | End: 2018-10-09
Attending: EMERGENCY MEDICINE | Admitting: GENERAL ACUTE CARE HOSPITAL
Payer: MEDICARE

## 2018-10-03 ENCOUNTER — APPOINTMENT (OUTPATIENT)
Dept: GENERAL RADIOLOGY | Age: 78
DRG: 637 | End: 2018-10-03
Attending: EMERGENCY MEDICINE
Payer: MEDICARE

## 2018-10-03 DIAGNOSIS — E13.11 DIABETIC KETOACIDOSIS WITH COMA ASSOCIATED WITH OTHER SPECIFIED DIABETES MELLITUS (HCC): Primary | ICD-10-CM

## 2018-10-03 DIAGNOSIS — E87.20 METABOLIC ACIDEMIA: ICD-10-CM

## 2018-10-03 LAB
ALBUMIN SERPL-MCNC: 3.3 G/DL (ref 3.5–5)
ALBUMIN SERPL-MCNC: 3.5 G/DL (ref 3.5–5)
ALBUMIN/GLOB SERPL: 1.1 {RATIO} (ref 1.1–2.2)
ALBUMIN/GLOB SERPL: 1.2 {RATIO} (ref 1.1–2.2)
ALP SERPL-CCNC: 136 U/L (ref 45–117)
ALP SERPL-CCNC: 144 U/L (ref 45–117)
ALT SERPL-CCNC: 31 U/L (ref 12–78)
ALT SERPL-CCNC: 35 U/L (ref 12–78)
AMORPH CRY URNS QL MICRO: ABNORMAL
ANION GAP SERPL CALC-SCNC: 15 MMOL/L (ref 5–15)
ANION GAP SERPL CALC-SCNC: 25 MMOL/L (ref 5–15)
ANION GAP SERPL CALC-SCNC: 27 MMOL/L (ref 5–15)
ANION GAP SERPL CALC-SCNC: 28 MMOL/L (ref 5–15)
APPEARANCE UR: CLEAR
APTT PPP: 22.2 SEC (ref 22.1–32)
APTT PPP: 25.4 SEC (ref 22.1–32)
ARTERIAL PATENCY WRIST A: YES
AST SERPL-CCNC: 30 U/L (ref 15–37)
AST SERPL-CCNC: 32 U/L (ref 15–37)
ATRIAL RATE: 91 BPM
BACTERIA URNS QL MICRO: NEGATIVE /HPF
BASE DEFICIT BLDA-SCNC: 21.4 MMOL/L
BASOPHILS # BLD: 0 K/UL (ref 0–0.1)
BASOPHILS NFR BLD: 0 % (ref 0–1)
BDY SITE: ABNORMAL
BILIRUB SERPL-MCNC: 0.5 MG/DL (ref 0.2–1)
BILIRUB SERPL-MCNC: 0.5 MG/DL (ref 0.2–1)
BILIRUB UR QL: NEGATIVE
BREATHS.SPONTANEOUS ON VENT: 29
BUN SERPL-MCNC: 50 MG/DL (ref 6–20)
BUN SERPL-MCNC: 54 MG/DL (ref 6–20)
BUN SERPL-MCNC: 55 MG/DL (ref 6–20)
BUN SERPL-MCNC: 56 MG/DL (ref 6–20)
BUN/CREAT SERPL: 29 (ref 12–20)
CALCIUM SERPL-MCNC: 8.4 MG/DL (ref 8.5–10.1)
CALCIUM SERPL-MCNC: 8.5 MG/DL (ref 8.5–10.1)
CALCIUM SERPL-MCNC: 8.6 MG/DL (ref 8.5–10.1)
CALCIUM SERPL-MCNC: 8.8 MG/DL (ref 8.5–10.1)
CALCULATED P AXIS, ECG09: 69 DEGREES
CALCULATED R AXIS, ECG10: -48 DEGREES
CALCULATED T AXIS, ECG11: 54 DEGREES
CHLORIDE SERPL-SCNC: 100 MMOL/L (ref 97–108)
CHLORIDE SERPL-SCNC: 109 MMOL/L (ref 97–108)
CHLORIDE SERPL-SCNC: 92 MMOL/L (ref 97–108)
CHLORIDE SERPL-SCNC: 94 MMOL/L (ref 97–108)
CK SERPL-CCNC: 103 U/L (ref 26–192)
CO2 SERPL-SCNC: 17 MMOL/L (ref 21–32)
CO2 SERPL-SCNC: 5 MMOL/L (ref 21–32)
CO2 SERPL-SCNC: 7 MMOL/L (ref 21–32)
CO2 SERPL-SCNC: 9 MMOL/L (ref 21–32)
COLOR UR: ABNORMAL
COMMENT, HOLDF: NORMAL
CREAT SERPL-MCNC: 1.7 MG/DL (ref 0.55–1.02)
CREAT SERPL-MCNC: 1.89 MG/DL (ref 0.55–1.02)
CREAT SERPL-MCNC: 1.92 MG/DL (ref 0.55–1.02)
CREAT SERPL-MCNC: 1.94 MG/DL (ref 0.55–1.02)
DIAGNOSIS, 93000: NORMAL
DIFFERENTIAL METHOD BLD: ABNORMAL
EOSINOPHIL # BLD: 0 K/UL (ref 0–0.4)
EOSINOPHIL NFR BLD: 0 % (ref 0–7)
EPITH CASTS URNS QL MICRO: ABNORMAL /LPF
ERYTHROCYTE [DISTWIDTH] IN BLOOD BY AUTOMATED COUNT: 13.2 % (ref 11.5–14.5)
EST. AVERAGE GLUCOSE BLD GHB EST-MCNC: 243 MG/DL
GLOBULIN SER CALC-MCNC: 2.9 G/DL (ref 2–4)
GLOBULIN SER CALC-MCNC: 3 G/DL (ref 2–4)
GLUCOSE BLD STRIP.AUTO-MCNC: 186 MG/DL (ref 65–100)
GLUCOSE BLD STRIP.AUTO-MCNC: 267 MG/DL (ref 65–100)
GLUCOSE BLD STRIP.AUTO-MCNC: 376 MG/DL (ref 65–100)
GLUCOSE BLD STRIP.AUTO-MCNC: 403 MG/DL (ref 65–100)
GLUCOSE BLD STRIP.AUTO-MCNC: 473 MG/DL (ref 65–100)
GLUCOSE BLD STRIP.AUTO-MCNC: 533 MG/DL (ref 65–100)
GLUCOSE BLD STRIP.AUTO-MCNC: >600 MG/DL (ref 65–100)
GLUCOSE SERPL-MCNC: 413 MG/DL (ref 65–100)
GLUCOSE SERPL-MCNC: 837 MG/DL (ref 65–100)
GLUCOSE SERPL-MCNC: 971 MG/DL (ref 65–100)
GLUCOSE SERPL-MCNC: 971 MG/DL (ref 65–100)
GLUCOSE UR STRIP.AUTO-MCNC: >1000 MG/DL
HBA1C MFR BLD: 10.1 % (ref 4.2–6.3)
HCO3 BLDA-SCNC: 6 MMOL/L (ref 22–26)
HCT VFR BLD AUTO: 32.2 % (ref 35–47)
HGB BLD-MCNC: 10.5 G/DL (ref 11.5–16)
HGB UR QL STRIP: NEGATIVE
HYALINE CASTS URNS QL MICRO: ABNORMAL /LPF (ref 0–5)
IMM GRANULOCYTES # BLD: 0.2 K/UL (ref 0–0.04)
IMM GRANULOCYTES NFR BLD AUTO: 2 % (ref 0–0.5)
INR PPP: 1.1 (ref 0.9–1.1)
INR PPP: 1.1 (ref 0.9–1.1)
KETONES UR QL STRIP.AUTO: 40 MG/DL
LACTATE SERPL-SCNC: 4 MMOL/L (ref 0.4–2)
LACTATE SERPL-SCNC: 5.4 MMOL/L (ref 0.4–2)
LACTATE SERPL-SCNC: 5.6 MMOL/L (ref 0.4–2)
LEUKOCYTE ESTERASE UR QL STRIP.AUTO: NEGATIVE
LIPASE SERPL-CCNC: 35 U/L (ref 73–393)
LYMPHOCYTES # BLD: 0.9 K/UL (ref 0.8–3.5)
LYMPHOCYTES NFR BLD: 6 % (ref 12–49)
MAGNESIUM SERPL-MCNC: 2.6 MG/DL (ref 1.6–2.4)
MCH RBC QN AUTO: 32 PG (ref 26–34)
MCHC RBC AUTO-ENTMCNC: 32.6 G/DL (ref 30–36.5)
MCV RBC AUTO: 98.2 FL (ref 80–99)
MONOCYTES # BLD: 1 K/UL (ref 0–1)
MONOCYTES NFR BLD: 6 % (ref 5–13)
NEUTS SEG # BLD: 13.4 K/UL (ref 1.8–8)
NEUTS SEG NFR BLD: 86 % (ref 32–75)
NITRITE UR QL STRIP.AUTO: NEGATIVE
NRBC # BLD: 0 K/UL (ref 0–0.01)
NRBC BLD-RTO: 0 PER 100 WBC
P-R INTERVAL, ECG05: 164 MS
PCO2 BLDA: 18 MMHG (ref 35–45)
PH BLDA: 7.13 [PH] (ref 7.35–7.45)
PH UR STRIP: 5 [PH] (ref 5–8)
PHOSPHATE SERPL-MCNC: 9 MG/DL (ref 2.6–4.7)
PLATELET # BLD AUTO: 271 K/UL (ref 150–400)
PMV BLD AUTO: 11.1 FL (ref 8.9–12.9)
PO2 BLDA: 114 MMHG (ref 80–100)
POTASSIUM SERPL-SCNC: 3.3 MMOL/L (ref 3.5–5.1)
POTASSIUM SERPL-SCNC: 5 MMOL/L (ref 3.5–5.1)
POTASSIUM SERPL-SCNC: 5.7 MMOL/L (ref 3.5–5.1)
POTASSIUM SERPL-SCNC: 6.1 MMOL/L (ref 3.5–5.1)
PROT SERPL-MCNC: 6.2 G/DL (ref 6.4–8.2)
PROT SERPL-MCNC: 6.5 G/DL (ref 6.4–8.2)
PROT UR STRIP-MCNC: NEGATIVE MG/DL
PROTHROMBIN TIME: 11.4 SEC (ref 9–11.1)
PROTHROMBIN TIME: 11.5 SEC (ref 9–11.1)
Q-T INTERVAL, ECG07: 360 MS
QRS DURATION, ECG06: 96 MS
QTC CALCULATION (BEZET), ECG08: 442 MS
RBC # BLD AUTO: 3.28 M/UL (ref 3.8–5.2)
RBC #/AREA URNS HPF: ABNORMAL /HPF (ref 0–5)
SAMPLES BEING HELD,HOLD: NORMAL
SAO2 % BLD: 97 % (ref 92–97)
SAO2% DEVICE SAO2% SENSOR NAME: ABNORMAL
SERVICE CMNT-IMP: ABNORMAL
SODIUM SERPL-SCNC: 126 MMOL/L (ref 136–145)
SODIUM SERPL-SCNC: 128 MMOL/L (ref 136–145)
SODIUM SERPL-SCNC: 133 MMOL/L (ref 136–145)
SODIUM SERPL-SCNC: 141 MMOL/L (ref 136–145)
SP GR UR REFRACTOMETRY: 1.03 (ref 1–1.03)
SPECIMEN SITE: ABNORMAL
THERAPEUTIC RANGE,PTTT: NORMAL SECS (ref 58–77)
THERAPEUTIC RANGE,PTTT: NORMAL SECS (ref 58–77)
TROPONIN I BLD-MCNC: <0.04 NG/ML (ref 0–0.08)
TROPONIN I SERPL-MCNC: 0.05 NG/ML
UA: UC IF INDICATED,UAUC: ABNORMAL
UROBILINOGEN UR QL STRIP.AUTO: 0.2 EU/DL (ref 0.2–1)
VENTRICULAR RATE, ECG03: 91 BPM
WBC # BLD AUTO: 15.6 K/UL (ref 3.6–11)
WBC URNS QL MICRO: ABNORMAL /HPF (ref 0–4)

## 2018-10-03 PROCEDURE — 81001 URINALYSIS AUTO W/SCOPE: CPT | Performed by: EMERGENCY MEDICINE

## 2018-10-03 PROCEDURE — 74011250636 HC RX REV CODE- 250/636: Performed by: EMERGENCY MEDICINE

## 2018-10-03 PROCEDURE — 99285 EMERGENCY DEPT VISIT HI MDM: CPT

## 2018-10-03 PROCEDURE — 83036 HEMOGLOBIN GLYCOSYLATED A1C: CPT | Performed by: EMERGENCY MEDICINE

## 2018-10-03 PROCEDURE — 96365 THER/PROPH/DIAG IV INF INIT: CPT

## 2018-10-03 PROCEDURE — 51798 US URINE CAPACITY MEASURE: CPT

## 2018-10-03 PROCEDURE — 51701 INSERT BLADDER CATHETER: CPT

## 2018-10-03 PROCEDURE — 85730 THROMBOPLASTIN TIME PARTIAL: CPT | Performed by: EMERGENCY MEDICINE

## 2018-10-03 PROCEDURE — 80053 COMPREHEN METABOLIC PANEL: CPT | Performed by: EMERGENCY MEDICINE

## 2018-10-03 PROCEDURE — 85610 PROTHROMBIN TIME: CPT | Performed by: EMERGENCY MEDICINE

## 2018-10-03 PROCEDURE — 87040 BLOOD CULTURE FOR BACTERIA: CPT | Performed by: EMERGENCY MEDICINE

## 2018-10-03 PROCEDURE — 74011000258 HC RX REV CODE- 258: Performed by: EMERGENCY MEDICINE

## 2018-10-03 PROCEDURE — 85025 COMPLETE CBC W/AUTO DIFF WBC: CPT | Performed by: EMERGENCY MEDICINE

## 2018-10-03 PROCEDURE — 65610000006 HC RM INTENSIVE CARE

## 2018-10-03 PROCEDURE — 83605 ASSAY OF LACTIC ACID: CPT | Performed by: EMERGENCY MEDICINE

## 2018-10-03 PROCEDURE — 77030038269 HC DRN EXT URIN PURWCK BARD -A

## 2018-10-03 PROCEDURE — 82803 BLOOD GASES ANY COMBINATION: CPT | Performed by: EMERGENCY MEDICINE

## 2018-10-03 PROCEDURE — 84100 ASSAY OF PHOSPHORUS: CPT | Performed by: EMERGENCY MEDICINE

## 2018-10-03 PROCEDURE — 83605 ASSAY OF LACTIC ACID: CPT | Performed by: GENERAL ACUTE CARE HOSPITAL

## 2018-10-03 PROCEDURE — 74011250636 HC RX REV CODE- 250/636: Performed by: GENERAL ACUTE CARE HOSPITAL

## 2018-10-03 PROCEDURE — 80053 COMPREHEN METABOLIC PANEL: CPT | Performed by: GENERAL ACUTE CARE HOSPITAL

## 2018-10-03 PROCEDURE — 93005 ELECTROCARDIOGRAM TRACING: CPT

## 2018-10-03 PROCEDURE — 71045 X-RAY EXAM CHEST 1 VIEW: CPT

## 2018-10-03 PROCEDURE — 74011636637 HC RX REV CODE- 636/637: Performed by: EMERGENCY MEDICINE

## 2018-10-03 PROCEDURE — 82550 ASSAY OF CK (CPK): CPT | Performed by: EMERGENCY MEDICINE

## 2018-10-03 PROCEDURE — 36415 COLL VENOUS BLD VENIPUNCTURE: CPT | Performed by: INTERNAL MEDICINE

## 2018-10-03 PROCEDURE — 83690 ASSAY OF LIPASE: CPT | Performed by: EMERGENCY MEDICINE

## 2018-10-03 PROCEDURE — 36600 WITHDRAWAL OF ARTERIAL BLOOD: CPT | Performed by: EMERGENCY MEDICINE

## 2018-10-03 PROCEDURE — 84484 ASSAY OF TROPONIN QUANT: CPT | Performed by: EMERGENCY MEDICINE

## 2018-10-03 PROCEDURE — 74011250636 HC RX REV CODE- 250/636: Performed by: INTERNAL MEDICINE

## 2018-10-03 PROCEDURE — 83735 ASSAY OF MAGNESIUM: CPT | Performed by: EMERGENCY MEDICINE

## 2018-10-03 PROCEDURE — 74011000250 HC RX REV CODE- 250: Performed by: EMERGENCY MEDICINE

## 2018-10-03 PROCEDURE — 82962 GLUCOSE BLOOD TEST: CPT

## 2018-10-03 RX ORDER — SODIUM CHLORIDE 9 MG/ML
150 INJECTION, SOLUTION INTRAVENOUS CONTINUOUS
Status: DISCONTINUED | OUTPATIENT
Start: 2018-10-03 | End: 2018-10-04

## 2018-10-03 RX ORDER — ENOXAPARIN SODIUM 100 MG/ML
30 INJECTION SUBCUTANEOUS EVERY 24 HOURS
Status: DISCONTINUED | OUTPATIENT
Start: 2018-10-03 | End: 2018-10-06

## 2018-10-03 RX ORDER — ETOMIDATE 2 MG/ML
20 INJECTION INTRAVENOUS
Status: DISCONTINUED | OUTPATIENT
Start: 2018-10-03 | End: 2018-10-03

## 2018-10-03 RX ORDER — SODIUM CHLORIDE 0.9 % (FLUSH) 0.9 %
5-10 SYRINGE (ML) INJECTION AS NEEDED
Status: DISCONTINUED | OUTPATIENT
Start: 2018-10-03 | End: 2018-10-05 | Stop reason: SDUPTHER

## 2018-10-03 RX ORDER — SODIUM CHLORIDE 0.9 % (FLUSH) 0.9 %
5-10 SYRINGE (ML) INJECTION AS NEEDED
Status: DISCONTINUED | OUTPATIENT
Start: 2018-10-03 | End: 2018-10-09 | Stop reason: HOSPADM

## 2018-10-03 RX ORDER — DEXTROSE 50 % IN WATER (D50W) INTRAVENOUS SYRINGE
25-50 AS NEEDED
Status: DISCONTINUED | OUTPATIENT
Start: 2018-10-03 | End: 2018-10-09 | Stop reason: HOSPADM

## 2018-10-03 RX ORDER — SODIUM BICARBONATE 1 MEQ/ML
50 SYRINGE (ML) INTRAVENOUS
Status: COMPLETED | OUTPATIENT
Start: 2018-10-03 | End: 2018-10-03

## 2018-10-03 RX ORDER — MAGNESIUM SULFATE 100 %
4 CRYSTALS MISCELLANEOUS AS NEEDED
Status: DISCONTINUED | OUTPATIENT
Start: 2018-10-03 | End: 2018-10-09 | Stop reason: HOSPADM

## 2018-10-03 RX ORDER — INSULIN LISPRO 100 [IU]/ML
INJECTION, SOLUTION INTRAVENOUS; SUBCUTANEOUS
Status: DISCONTINUED | OUTPATIENT
Start: 2018-10-03 | End: 2018-10-04

## 2018-10-03 RX ORDER — SODIUM CHLORIDE 0.9 % (FLUSH) 0.9 %
5-10 SYRINGE (ML) INJECTION EVERY 8 HOURS
Status: DISCONTINUED | OUTPATIENT
Start: 2018-10-03 | End: 2018-10-05 | Stop reason: SDUPTHER

## 2018-10-03 RX ORDER — POTASSIUM CHLORIDE 7.45 MG/ML
10 INJECTION INTRAVENOUS
Status: COMPLETED | OUTPATIENT
Start: 2018-10-03 | End: 2018-10-08

## 2018-10-03 RX ORDER — SODIUM CHLORIDE 0.9 % (FLUSH) 0.9 %
5-10 SYRINGE (ML) INJECTION EVERY 8 HOURS
Status: DISCONTINUED | OUTPATIENT
Start: 2018-10-03 | End: 2018-10-09 | Stop reason: HOSPADM

## 2018-10-03 RX ORDER — ROCURONIUM BROMIDE 10 MG/ML
1 INJECTION, SOLUTION INTRAVENOUS
Status: DISCONTINUED | OUTPATIENT
Start: 2018-10-03 | End: 2018-10-03

## 2018-10-03 RX ORDER — DEXTROSE, SODIUM CHLORIDE, AND POTASSIUM CHLORIDE 5; .45; .15 G/100ML; G/100ML; G/100ML
100 INJECTION INTRAVENOUS CONTINUOUS
Status: DISCONTINUED | OUTPATIENT
Start: 2018-10-04 | End: 2018-10-05

## 2018-10-03 RX ADMIN — SODIUM CHLORIDE 10.8 UNITS/HR: 900 INJECTION, SOLUTION INTRAVENOUS at 14:54

## 2018-10-03 RX ADMIN — POTASSIUM CHLORIDE 10 MEQ: 10 INJECTION, SOLUTION INTRAVENOUS at 23:45

## 2018-10-03 RX ADMIN — SODIUM BICARBONATE 50 MEQ: 84 INJECTION INTRAVENOUS at 16:45

## 2018-10-03 RX ADMIN — Medication 10 ML: at 18:20

## 2018-10-03 RX ADMIN — ENOXAPARIN SODIUM 30 MG: 30 INJECTION, SOLUTION INTRAVENOUS; SUBCUTANEOUS at 18:20

## 2018-10-03 RX ADMIN — SODIUM CHLORIDE 150 ML/HR: 900 INJECTION, SOLUTION INTRAVENOUS at 17:57

## 2018-10-03 RX ADMIN — POTASSIUM CHLORIDE 10 MEQ: 10 INJECTION, SOLUTION INTRAVENOUS at 22:00

## 2018-10-03 RX ADMIN — SODIUM CHLORIDE 1000 ML: 900 INJECTION, SOLUTION INTRAVENOUS at 14:37

## 2018-10-03 RX ADMIN — Medication 10 ML: at 20:48

## 2018-10-03 RX ADMIN — SODIUM CHLORIDE 16.2 UNITS/HR: 900 INJECTION, SOLUTION INTRAVENOUS at 17:04

## 2018-10-03 RX ADMIN — SODIUM CHLORIDE 1000 ML: 900 INJECTION, SOLUTION INTRAVENOUS at 13:15

## 2018-10-03 RX ADMIN — SODIUM CHLORIDE 20.7 UNITS/HR: 900 INJECTION, SOLUTION INTRAVENOUS at 20:27

## 2018-10-03 RX ADMIN — Medication 10 ML: at 14:00

## 2018-10-03 RX ADMIN — SODIUM CHLORIDE 150 ML/HR: 900 INJECTION, SOLUTION INTRAVENOUS at 23:02

## 2018-10-03 NOTE — ED NOTES
Assumed care of patient. Patient placed in position of comfort. Call bell in reach. Pt presents to the ED via EMS d/t being found with AMS and hyperglycemia, by family at her home. Upon ED arrival pt is alert but appears confused and restless. Tachypnea and tachycardia noted. EKG performed and iStat troponin is processing at the bedside. Cardiac monitor x 3.

## 2018-10-03 NOTE — ED PROVIDER NOTES
EMERGENCY DEPARTMENT HISTORY AND PHYSICAL EXAM 
 
 
Date: 10/3/2018 Patient Name: Alvarez Ferris History of Presenting Illness Chief Complaint Patient presents with  
 High Blood Sugar Friends visited pt this morning, and found her to be confused. Home BG machine used and read \"HI. \"  HFD called to the scene. BG with their equipment also read \"HI. \"  Pt given 250ml bolus NS enroute. History Provided By: Patient and EMS 
 
HPI: Alvarez Ferris, 69 y.o. female with PMHx significant for MI, DM, stroke, HTN, heart failure, and hypercholesterolemia, presents via EMS to the ED with cc of elevated BGL today. Per EMS, pt's friends found her \"confused\" at home today, and had BGL reading \"HI\" on at home glucometer. At time of examination, pt states she is unsure why she is in ED. She states she is compliant with her prescribed insulin. She reports she is currently taking an anticoagulant. Pt denies recent trauma, injury, or fall. She notes she lives alone. Pt denies CP, ABD pain, or N/V/D. Hx is otherwise limited secondary to the condition of the pt. PCP: eDrick Wagner MD 
 
Current Facility-Administered Medications Medication Dose Route Frequency Provider Last Rate Last Dose  sodium chloride (NS) flush 5-10 mL  5-10 mL IntraVENous Q8H Bhavesh Collins MD      
 sodium chloride (NS) flush 5-10 mL  5-10 mL IntraVENous PRN Mickey Kelsey MD      
 sodium chloride 0.9 % bolus infusion 1,000 mL  1,000 mL IntraVENous ONCE Mickey Kelsey MD      
 sodium chloride 0.9 % bolus infusion 1,000 mL  1,000 mL IntraVENous ONCE Mickey Kelsey MD      
 insulin regular (NOVOLIN R, HUMULIN R) 100 Units in 0.9% sodium chloride 100 mL infusion  0-50 Units/hr IntraVENous TITRATE Mickey Kelsey MD      
 insulin lispro (HUMALOG) injection   SubCUTAneous Abi Paz MD      
 glucose chewable tablet 16 g  4 Tab Oral PRN Mickey Kelsey MD      
  dextrose (D50W) injection syrg 12.5-25 g  25-50 mL IntraVENous PRN Loreto Crisostomo MD      
 glucagon (GLUCAGEN) injection 1 mg  1 mg IntraMUSCular PRN Loreto Crisostomo MD      
 
Current Outpatient Prescriptions Medication Sig Dispense Refill  insulin detemir U-100 (LEVEMIR FLEXTOUCH) 100 unit/mL (3 mL) inpn 30 units every morning3 3 Adjustable Dose Pre-filled Pen Syringe 11  
 pravastatin (PRAVACHOL) 80 mg tablet Take 80 mg by mouth daily.  aspirin/salicylamide/caffeine (BC HEADACHE POWDER PO) Take 1 Packet by mouth three (3) times daily as needed (back pain).  fenofibrate nanocrystallized (TRICOR) 145 mg tablet TAKE 1 TABLET BY MOUTH EVERY DAY 30 Tab 11  
 levothyroxine (SYNTHROID) 150 mcg tablet TAKE 1 TABLET BY MOUTH EVERY DAY 30 Tab 11  
 clopidogrel (PLAVIX) 75 mg tab TAKE 1 TAB BY MOUTH DAILY. 30 Tab 11  
 amLODIPine (NORVASC) 10 mg tablet TAKE 1 TABLET BY MOUTH EVERY MORNING 90 Tab 3  
 losartan-hydroCHLOROthiazide (HYZAAR) 100-25 mg per tablet TAKE 1 TABLET BY MOUTH DAILY 30 Tab 11  
 brimonidine (ALPHAGAN) 0.15 % ophthalmic solution Administer 1 Drop to both eyes two (2) times a day. Past History Past Medical History: 
Past Medical History:  
Diagnosis Date  Diabetes ([address]  Heart failure ([address]   
 unknown to family  Hypercholesteremia  Hypertension  Stroke [address] [address]  Thyroid disease Past Surgical History: 
Past Surgical History:  
Procedure Laterality Date  HX GYN    
 HX HEENT    
 thyroidectomy  HX HYSTERECTOMY  REMOVAL GALLBLADDER    
 THYROIDECTOMY Family History: 
Family History Problem Relation Age of Onset  Diabetes Father  No Known Problems Mother Social History: 
Social History Substance Use Topics  Smoking status: Never Smoker  Smokeless tobacco: Never Used  Alcohol use No  
   Comment: been years Allergies: 
No Known Allergies Review of Systems Review of Systems Unable to perform ROS: Acuity of condition Physical Exam  
Physical Exam  
Constitutional: She is oriented to person, place, and time. She appears well-developed. Critically ill appearing No obvious trauma noted HENT:  
Head: Normocephalic and atraumatic. Mouth/Throat: No oropharyngeal exudate. Eyes: Conjunctivae are normal. Pupils are equal, round, and reactive to light. Right eye exhibits no discharge. Left eye exhibits no discharge. No scleral icterus. Neck: Normal range of motion. Neck supple. No JVD present. Normal range of motion present. No Brudzinski's sign and no Kernig's sign noted. Cardiovascular: Regular rhythm, normal heart sounds and intact distal pulses. Tachycardia present. Exam reveals no gallop and no friction rub. No murmur heard. Pulmonary/Chest: Effort normal and breath sounds normal. No stridor. Tachypnea noted. No respiratory distress. She has no wheezes. She has no rales. She exhibits no tenderness. Kussmaul's respirations-airway is protected Abdominal: Soft. Bowel sounds are normal. She exhibits no distension and no mass. There is no hepatosplenomegaly. There is no tenderness. There is no rigidity, no rebound, no guarding, no CVA tenderness, no tenderness at McBurney's point and negative García's sign. No hernia. Neurological: She is alert and oriented to person, place, and time. She displays normal reflexes. No cranial nerve deficit. She exhibits normal muscle tone. Coordination normal. GCS eye subscore is 4. GCS verbal subscore is 5. GCS motor subscore is 6. Non-focal neuro exam  
Skin: Skin is warm. No rash noted. She is not diaphoretic. No pallor. Psychiatric: Her speech is delayed. She is slowed. Vitals reviewed. Diagnostic Study Results Labs - Recent Results (from the past 12 hour(s)) GLUCOSE, POC Collection Time: 10/03/18 12:43 PM  
Result Value Ref Range Glucose (POC) >600 (HH) 65 - 100 mg/dL Performed by Ann Gonzalez (PCT) GLUCOSE, POC Collection Time: 10/03/18 12:44 PM  
Result Value Ref Range Glucose (POC) >600 () 65 - 100 mg/dL Performed by Ann Gonzalez ([facility]) EKG, 12 LEAD, INITIAL Collection Time: 10/03/18 12:48 PM  
Result Value Ref Range Ventricular Rate 91 BPM  
 Atrial Rate 91 BPM  
 P-R Interval 164 ms QRS Duration 96 ms  
 Q-T Interval 360 ms QTC Calculation (Bezet) 442 ms Calculated P Axis 69 degrees Calculated R Axis -48 degrees Calculated T Axis 54 degrees Diagnosis Normal sinus rhythm Left axis deviation Incomplete right bundle branch block Nonspecific ST abnormality Confirmed by Ponce Crigler ([id number]) on 10/3/2018 3:27:14 PM 
  
POC TROPONIN-I Collection Time: 10/03/18  1:07 PM  
Result Value Ref Range Troponin-I (POC) <0.04 0.00 - 0.08 ng/mL CBC WITH AUTOMATED DIFF Collection Time: 10/03/18  1:16 PM  
Result Value Ref Range WBC 15.6 (H) 3.6 - 11.0 K/uL  
 RBC 3.28 (L) 3.80 - 5.20 M/uL  
 HGB 10.5 (L) 11.5 - 16.0 g/dL HCT 32.2 (L) 35.0 - 47.0 % MCV 98.2 80.0 - 99.0 FL  
 MCH 32.0 26.0 - 34.0 PG  
 MCHC 32.6 30.0 - 36.5 g/dL  
 RDW 13.2 11.5 - 14.5 % PLATELET 458 217 - 791 K/uL MPV 11.1 8.9 - 12.9 FL  
 NRBC 0.0 0  WBC ABSOLUTE NRBC 0.00 0.00 - 0.01 K/uL NEUTROPHILS 86 (H) 32 - 75 % LYMPHOCYTES 6 (L) 12 - 49 % MONOCYTES 6 5 - 13 % EOSINOPHILS 0 0 - 7 % BASOPHILS 0 0 - 1 % IMMATURE GRANULOCYTES 2 (H) 0.0 - 0.5 % ABS. NEUTROPHILS 13.4 (H) 1.8 - 8.0 K/UL  
 ABS. LYMPHOCYTES 0.9 0.8 - 3.5 K/UL  
 ABS. MONOCYTES 1.0 0.0 - 1.0 K/UL  
 ABS. EOSINOPHILS 0.0 0.0 - 0.4 K/UL  
 ABS. BASOPHILS 0.0 0.0 - 0.1 K/UL  
 ABS. IMM. GRANS. 0.2 (H) 0.00 - 0.04 K/UL  
 DF AUTOMATED METABOLIC PANEL, COMPREHENSIVE Collection Time: 10/03/18  1:16 PM  
Result Value Ref Range Sodium 126 (L) 136 - 145 mmol/L Potassium 6.1 (H) 3.5 - 5.1 mmol/L  Chloride 92 (L) 97 - 108 mmol/L  
 CO2 9 (LL) 21 - 32 mmol/L Anion gap 25 (H) 5 - 15 mmol/L Glucose 971 (HH) 65 - 100 mg/dL BUN 54 (H) 6 - 20 MG/DL Creatinine 1.89 (H) 0.55 - 1.02 MG/DL  
 BUN/Creatinine ratio 29 (H) 12 - 20 GFR est AA 31 (L) >60 ml/min/1.73m2 GFR est non-AA 26 (L) >60 ml/min/1.73m2 Calcium 8.8 8.5 - 10.1 MG/DL Bilirubin, total 0.5 0.2 - 1.0 MG/DL  
 ALT (SGPT) 31 12 - 78 U/L  
 AST (SGOT) 30 15 - 37 U/L Alk. phosphatase 144 (H) 45 - 117 U/L Protein, total 6.5 6.4 - 8.2 g/dL Albumin 3.5 3.5 - 5.0 g/dL Globulin 3.0 2.0 - 4.0 g/dL A-G Ratio 1.2 1.1 - 2.2    
TROPONIN I Collection Time: 10/03/18  1:16 PM  
Result Value Ref Range Troponin-I, Qt. 0.05 (H) <0.05 ng/mL CK W/ REFLX CKMB Collection Time: 10/03/18  1:16 PM  
Result Value Ref Range  26 - 192 U/L  
LACTIC ACID Collection Time: 10/03/18  1:16 PM  
Result Value Ref Range Lactic acid 5.6 (HH) 0.4 - 2.0 MMOL/L  
URINALYSIS W/ REFLEX CULTURE Collection Time: 10/03/18  1:16 PM  
Result Value Ref Range Color YELLOW/STRAW Appearance CLEAR CLEAR Specific gravity 1.027 1.003 - 1.030    
 pH (UA) 5.0 5.0 - 8.0 Protein NEGATIVE  NEG mg/dL Glucose >1000 (A) NEG mg/dL Ketone 40 (A) NEG mg/dL Bilirubin NEGATIVE  NEG Blood NEGATIVE  NEG Urobilinogen 0.2 0.2 - 1.0 EU/dL Nitrites NEGATIVE  NEG Leukocyte Esterase NEGATIVE  NEG    
 WBC 0-4 0 - 4 /hpf  
 RBC 0-5 0 - 5 /hpf Epithelial cells FEW FEW /lpf Bacteria NEGATIVE  NEG /hpf  
 UA:UC IF INDICATED CULTURE NOT INDICATED BY UA RESULT CNI Amorphous Crystals 1+ (A) NEG Hyaline cast 0-2 0 - 5 /lpf  
LIPASE Collection Time: 10/03/18  1:16 PM  
Result Value Ref Range Lipase 35 (L) 73 - 393 U/L MAGNESIUM Collection Time: 10/03/18  1:16 PM  
Result Value Ref Range Magnesium 2.6 (H) 1.6 - 2.4 mg/dL METABOLIC PANEL, BASIC Collection Time: 10/03/18  2:07 PM  
Result Value Ref Range Sodium 128 (L) 136 - 145 mmol/L Potassium 5.7 (H) 3.5 - 5.1 mmol/L Chloride 94 (L) 97 - 108 mmol/L  
 CO2 7 (LL) 21 - 32 mmol/L Anion gap 27 (H) 5 - 15 mmol/L Glucose 971 (HH) 65 - 100 mg/dL BUN 55 (H) 6 - 20 MG/DL Creatinine 1.92 (H) 0.55 - 1.02 MG/DL  
 BUN/Creatinine ratio 29 (H) 12 - 20 GFR est AA 31 (L) >60 ml/min/1.73m2 GFR est non-AA 25 (L) >60 ml/min/1.73m2 Calcium 8.6 8.5 - 10.1 MG/DL  
PHOSPHORUS Collection Time: 10/03/18  2:07 PM  
Result Value Ref Range Phosphorus 9.0 (H) 2.6 - 4.7 MG/DL PROTHROMBIN TIME + INR Collection Time: 10/03/18  2:08 PM  
Result Value Ref Range INR 1.1 0.9 - 1.1 Prothrombin time 11.4 (H) 9.0 - 11.1 sec PTT Collection Time: 10/03/18  2:08 PM  
Result Value Ref Range aPTT 22.2 22.1 - 32.0 sec  
 aPTT, therapeutic range     58.0 - 77.0 SECS  
GLUCOSE, POC Collection Time: 10/03/18  2:46 PM  
Result Value Ref Range Glucose (POC) >600 (HH) 65 - 100 mg/dL Performed by Olamide Rocha BLOOD GAS, ARTERIAL Collection Time: 10/03/18  3:48 PM  
Result Value Ref Range pH 7.13 (LL) 7.35 - 7.45    
 PCO2 18 (L) 35.0 - 45.0 mmHg PO2 114 (H) 80 - 100 mmHg O2 SAT 97 92 - 97 % BICARBONATE 6 (L) 22 - 26 mmol/L  
 BASE DEFICIT 21.4 mmol/L  
 O2 METHOD ROOM AIR    
 SPONTANEOUS RATE 29.0 Sample source ARTERIAL    
 SITE RIGHT RADIAL SHERIF'S TEST YES Critical value read back Jessica Arreola MD   
GLUCOSE, POC Collection Time: 10/03/18  3:58 PM  
Result Value Ref Range Glucose (POC) >600 (HH) 65 - 100 mg/dL Performed by Lauren Aponte Radiologic Studies - CXR Results  (Last 48 hours) 10/03/18 1518  XR CHEST PORT Final result Impression:  IMPRESSION: No airspace disease or other acute abnormality. Narrative:  EXAM:  TEMPORARY. INDICATION: Hyperglycemia. COMPARISON: 6/2/2017. FINDINGS:   
A portable AP radiograph of the chest was obtained at 1314 hours.  The patient is  
slightly rotated to the right. Lines and tubes: The patient is on a cardiac monitor. Lungs: The lungs are clear. Pleura: There is no pneumothorax or pleural effusion. Mediastinum: The cardiac silhouette is enlarged. Bones and soft tissues: The bones and soft tissues are grossly within normal  
limits. Medical Decision Making I am the first provider for this patient. I reviewed the vital signs, available nursing notes, past medical history, past surgical history, family history and social history. Vital Signs-Reviewed the patient's vital signs. Patient Vitals for the past 12 hrs: 
 Temp Pulse Resp BP SpO2  
10/03/18 1331 - 94 29 114/51 98 % 10/03/18 1245 98.1 °F (36.7 °C) 95 20 124/44 95 % Pulse Oximetry Analysis - 98% on RA Cardiac Monitor:  
Rate: 91 bpm 
Rhythm: Normal Sinus Rhythm EKG interpretation: 12:48 Rhythm: normal sinus rhythm; and regular . Rate (approx.): 91; Axis: left axis deviation; MI interval: normal; QRS interval: normal ; ST/T wave: normal; Other findings: reviewed by Cardiology: No STEMI per Dr. Lazaro Ozuna. Records Reviewed: Nursing Notes, Old Medical Records, Previous electrocardiograms, Previous Radiology Studies and Previous Laboratory Studies Provider Notes (Medical Decision Making): DDx: coronary syndrome, occult infection, DKA, nonketotic hyperosmolar syndrome,electrolyte imbalance,respiratory failure,CVA,seizure,endocrine dysfunction Impression/Plan: critically ill pt with confusion, noted to have elevated blood sugar on arrival. Concerning EKG, but reviewed by Cardiology who did not think it was STEMI. Has had recent issues with hypoglycemia, so theres has been adjustment to her medication. Plan will be to admit to Hospitalist, with initiation of insulin infusion. Will need aggressive fluid resuscitation. Pulmonologist agrees with no intubation at this time ED Course: Initial assessment performed. The patients presenting problems have been discussed, and they are in agreement with the care plan formulated and outlined with them. I have encouraged them to ask questions as they arise throughout their visit. 1:07 PM 
Pt denies CP, will check troponin. Do not feel STEMI at this time, will tigertext EKG to Cardiologist. 
 
Consult Note: 
1:24 PM 
Lex Jean MD spoke with Madhu Patricio, CARLOS Specialty: Cardiology Discussed pts hx, disposition, and available diagnostic and imaging results. Reviewed care plans. Consultant agrees with plans as outlined. In ED to see pt. 
 
1:27 PM 
Spoke with Dr. Pat Pepe, does not feel pt having STEMI. CONSULT NOTE:  
2:17 PM 
Lex Jean MD spoke with Randy Toure MD, Specialty: Hospitalist 
Discussed pt's hx, disposition, and available diagnostic and imaging results. Reviewed care plans. Consultant will evaluate pt for admission. 3:30PM-family at bedside patient globally slow maintains airway, will need fluid resuscitation and insulin drip. Will avoid bicarb unless ph less than 6.8. Discussed with DR Carrillo Garza and she  Agrees no antibiotics at this time. Will have PICC team place line as peripherals are tenuous. 4:35 PM 
Hospitalist Dr Gilson Silveira requested pt to be intubated due to change of mental status and concerns for airway protection. Dr Gilson Silveira also discontinued PICC line as does not feel is warranted at this time. SIGN OUT: 
4:40 PM 
Patient's presentation, labs/imaging and plan of care was reviewed with Dorys Regan DO as part of sign out. He will intubate pt as part of the plan discussed with the patient. Dorys Regan DO's assistance in completion of this plan is greatly appreciated but it should be noted that I will be the provider of record for this patient. Lex Jean MD 
 
5:06 PM 
Pt is awake and drawing from stimuli.  She is protecting her airway; do not feel she needs to be intubated at this time. Dr. Cata Chowdary (pulmonologist) evaluated pt and agrees that she does not need to be intubated. Written by CARLOS Shaveribfito, as dictated by [employer], DO. Critical Care Time: CRITICAL CARE NOTE : 
2:17 PM 
IMPENDING DETERIORATION -Airway, Respiratory, Cardiovascular, CNS, Metabolic and Renal 
ASSOCIATED RISK FACTORS - Hypotension, Shock, Dysrhythmia, Metabolic changes, Dehydration and CNS Decompensation MANAGEMENT- Bedside Assessment and Supervision of Care INTERPRETATION -  Xrays, Blood Gases, ECG and Blood Pressure INTERVENTIONS - hemodynamic mngmt, Metobolic interventions and insulin drip CASE REVIEW - Hospitalist, Medical Sub-Specialist, Nursing and Family TREATMENT RESPONSE -Stable PERFORMED BY - Self I have spent 45 minutes of critical care time involved in lab review, consultations with specialist, family decision- making, bedside attention and documentation. During this entire length of time I was immediately available to the patient . Roderick Arango MD 
 
Disposition: 
ADMIT NOTE: 
2:17 PM 
The patient is being admitted to the hospital.  The results of their tests and reasons for their admission have been discussed with the patient and/or available family. They convey agreement and understanding for the need to be admitted and for their admission diagnosis. PLAN: 
1. Admit to Hospitalist 
 
Diagnosis Clinical Impression: 1. Diabetic ketoacidosis with coma associated with other specified diabetes mellitus ([facility] [address] 2. Metabolic acidemia Attestations: This note is prepared by Bret Sullivan, acting as Scribe for Roderick Arango MD. Roderick Arango MD: The scribe's documentation has been prepared under my direction and personally reviewed by me in its entirety. I confirm that the note above accurately reflects all work, treatment, procedures, and medical decision making performed by me.

## 2018-10-03 NOTE — PROGRESS NOTES
10/3/2018 INTENSIVIST PROGRESS NOTE:  
 
Patient seen and evaluated, chart reviewed. Admitted with DKA. About to be intubated for lethargy. She is confused but arousable and mumbling a few words \"I'm OK\". I have discontinued plans for intubation. No history from patient. Visit Vitals  BP (!) 140/38  Pulse 99  Temp 99 °F (37.2 °C)  Resp (!) 36  Wt 77.1 kg (170 lb)  SpO2 98%  BMI 30.11 kg/m2 General: lethargic, ill appearing Eyes: anicteric HEENT: dry mucus membranes CV: RRR, no murmur Lungs: Few ronchi Abd: soft, +BS Ext: no cyanosis, no clubbing Skin: warm and dry Musculoskeletal: no gross deformities Neuro: lethargic, protecting her airway CXR: no acute process Labs reviewed A/P: 
- DKA and hyperosmolar state - insulin drip, IV fluids per protocol - discontinue bicarbonate; not indicated unless pH <6.8 and will worsen DKA 
- lethargy - due to DKA - no need for intubation at this time, protecting her airway 
- acute renal failure - IV fluids 
- replete electrolytes as needed 
- DVT prophylaxis Ja Leggett MD

## 2018-10-03 NOTE — PROGRESS NOTES
1900- Report received from [address] Ming drowsy, opens eyes briefly to voice/light touch but then drifts back to sleep. Slow to follow commands, will not answer orientation questions. 2107- Critical lactic 4.0 called to [facility] by lab.  
2126- Dr Simona Novoa updated with labs. Lactic 4, K 3.3. Orders received. 2342- Pt less drowsy. Oriented to person and place, no complaints of pain. Pt encouraged to void, states she doesn't feel like she needs to. Will bladder scan. 80- Dr Simona Novoa updated re:BG <250 and bladder scan 640mL. Orders received. 0110- Straight cath done. 800mL clear yellow urine. New purewick in place. 0143- BG 77. Insulin drip on hold, 9mL of D50 given. 0200- BG 80. 8mL of D50 given per glucostabilizer. Continue to hold insulin drip. 0217- Insulin restarted @ 0.9units/hr. BG 97. 
0600- Pt had large BM. Attempt to void, unable. Will give pt more time and then bladder scan. 6522- Report given to [facility] 72..

## 2018-10-03 NOTE — PROGRESS NOTES
1730 Patient admitted to CCU via stretcher from the ED with DKA. Patient drowsy but able to answer questions. Insulin drip continues. Full assessment documented. 1900 Report given to estefania nurseBreanna RN.

## 2018-10-03 NOTE — ED NOTES
Bedside shift change report given to Terryann Hodgkins, RN (oncoming nurse) by [facility], RN (offgoing nurse). Report included the following information SBAR, Kardex, ED Summary, Intake/Output, MAR, Recent Results and Cardiac Rhythm sinus tachycardia.

## 2018-10-03 NOTE — ED NOTES
TRANSFER - OUT REPORT: 
 
Verbal report given to VIN Ledbetter(name) on Jil Gardner  being transferred to CCU room 2536(unit) for routine progression of care Report consisted of patients Situation, Background, Assessment and  
Recommendations(SBAR). Information from the following report(s) SBAR, Kardex, ED Summary, Intake/Output, MAR and Recent Results was reviewed with the receiving nurse. Lines:  
Peripheral IV 10/03/18 Left Hand (Active) Site Assessment Clean, dry, & intact 10/3/2018 12:49 PM  
Phlebitis Assessment 0 10/3/2018 12:49 PM  
Infiltration Assessment 0 10/3/2018 12:49 PM  
Dressing Status Clean, dry, & intact 10/3/2018 12:49 PM  
Dressing Type Transparent 10/3/2018 12:49 PM  
Hub Color/Line Status Pink 10/3/2018 12:49 PM  
   
Peripheral IV 10/03/18 Right Wrist (Active) Site Assessment Clean, dry, & intact 10/3/2018  1:27 PM  
Phlebitis Assessment 0 10/3/2018  1:27 PM  
Infiltration Assessment 0 10/3/2018  1:27 PM  
Dressing Status Clean, dry, & intact 10/3/2018  1:27 PM  
  
 
Opportunity for questions and clarification was provided. Patient transported with: 
 Monitor Registered Nurse Tech

## 2018-10-03 NOTE — IP AVS SNAPSHOT
[address] [facility] 
[phone] Patient: Primitivo Alex MRN: [id number] UCA:36/53/2824 You are allergic to the following No active allergies Recent Documentation Height Weight BMI OB Status Smoking Status 1.59 m 78.9 kg 31.21 kg/m2 Hysterectomy Never Smoker Emergency Contacts  (Rel.) Home Phone Work Phone Mobile Phone [facility] (Child) [phone] -- [phone] Prudkelly Sommers [phone] -- [phone] About your hospitalization You were admitted on:  October 3, 2018 You last received care in the:  [address] You were discharged on:  October 9, 2018 Why you were hospitalized Your primary diagnosis was:  Not on File Your diagnoses also included:  Dka (Diabetic Ketoacidoses) ([facility]) Providers Seen During Your Hospitalization Provider Specialty Primary office phone Devin Blanton MD Emergency Medicine [phone] Bárbara Israel MD Internal Medicine [phone] Your Primary Care Physician (PCP) Primary Care Physician Office Phone Office Fax Loida Jurado [facility] [phone] Follow-up Information Follow up With Details Comments Contact Info Bárbara Israel MD Go on 10/15/2018 Hospital follow-up scheduled at 1:15pm ( If you have questions or need to reschedule please call [phone] [place] [address] 
[phone] Appointments for Next 14 days 10/15/2018  1:15 PM  Gloria Merino [facility] My Medications STOP taking these medications BC HEADACHE POWDER PO  
   
  
  
TAKE these medications as instructed Instructions Each Dose to Equal  
 Morning Noon Evening Bedtime  
 amLODIPine 10 mg tablet Commonly known as:  Maximo Shannon Your last dose was: Your next dose is: TAKE 1 TABLET BY MOUTH EVERY MORNING  
     
   
   
   
  
 brimonidine 0.15 % ophthalmic solution Commonly known as:  Angela Piano Your last dose was: Your next dose is:    
   
   
 Administer 1 Drop to both eyes two (2) times a day. 1 Drop  
    
   
   
   
  
 clopidogrel 75 mg Tab Commonly known as:  PLAVIX Your last dose was: Your next dose is: TAKE 1 TAB BY MOUTH DAILY. fenofibrate nanocrystallized 145 mg tablet Commonly known as:  [employer] Your last dose was: Your next dose is: TAKE 1 TABLET BY MOUTH EVERY DAY  
     
   
   
   
  
 insulin detemir U-100 100 unit/mL (3 mL) Inpn Commonly known as:  Jovi Sahu Your last dose was: Your next dose is:    
   
   
 30 units every morning3  
     
   
   
   
  
 levothyroxine 150 mcg tablet Commonly known as:  SYNTHROID Your last dose was: Your next dose is: TAKE 1 TABLET BY MOUTH EVERY DAY  
     
   
   
   
  
 losartan-hydroCHLOROthiazide 100-25 mg per tablet Commonly known as:  HYZAAR Your last dose was: Your next dose is: TAKE 1 TABLET BY MOUTH DAILY pravastatin 80 mg tablet Commonly known as:  PRAVACHOL Your last dose was: Your next dose is: Take 80 mg by mouth daily. 80 mg Discharge Instructions General Discharge Instructions Patient ID: 
Karen Kras [id number] 
23 y.o. 
1940 Patient Instructions It is mandatory that you eat at the same time every day. This means breakfast at 9 AM lunch at 1 PM and dinner between 5 and 6 PM.  You have to also eat a bedtime snack at 9-10 PM to include at least 2 packets of peanut butter crackers. You cannot miss any meals and all meals have to be completely consumed. The following personal items were collected during your admission and were returned to you. Take Home Medications What to do at [facility] Recommended diet: Diabetic Diet Recommended activity: Activity as tolerated Follow-up with Eulalia Villarreal MD  in 3 days. Information obtained by : 
I understand that if any problems occur once I am at home I am to contact my physician. I understand and acknowledge receipt of the instructions indicated above. Physician's or R.N.'s Signature                                                                  Date/Time Patient or Representative Signature                                                          Date/Time Discharge Orders None Introducing [facility]! [facility] introduces [employer] patient portal. Now you can access parts of your medical record, email your doctor's office, and request medication refills online. 1. In your internet browser, go to [email or web address] 2. Click on the First Time User? Click Here link in the Sign In box. You will see the New Member Sign Up page. 3. Enter your [employer] Access Code exactly as it appears below. You will not need to use this code after youve completed the sign-up process. If you do not sign up before the expiration date, you must request a new code. · [employer] Access Code: [email or web address] Expires: 12/31/2018  5:22 AM 
 
4. Enter the last four digits of your Social Security Number (xxxx) and Date of Birth (mm/dd/yyyy) as indicated and click Submit. You will be taken to the next sign-up page. 5. Create a [employer] ID.  This will be your [employer] login ID and cannot be changed, so think of one that is secure and easy to remember. 6. Create a [employer] password. You can change your password at any time. 7. Enter your Password Reset Question and Answer. This can be used at a later time if you forget your password. 8. Enter your e-mail address. You will receive e-mail notification when new information is available in [address] 9. Click Sign Up. You can now view and download portions of your medical record. 10. Click the Download Summary menu link to download a portable copy of your medical information. If you have questions, please visit the Frequently Asked Questions section of the [employer] website. Remember, [employer] is NOT to be used for urgent needs. For medical emergencies, dial 911. Now available from your iPhone and Android! General Information Please provide this summary of care documentation to your next provider. Patient Signature:  ____________________________________________________________ Date:  ____________________________________________________________  
  
Rozina Bone Provider Signature:  ____________________________________________________________ Date:  ____________________________________________________________

## 2018-10-03 NOTE — ED NOTES
CCU RN contacted and report given at this time. Pulmonologist at bedside stating that it is not neccessary to intubate at this time. CCU RN at bedside receiving bedside report along with charge nurse.

## 2018-10-03 NOTE — ED NOTES
Hospitalist at bedside discussing care with patient's friend and family members. Patient remains confused at this time but does respond to voice. Per hospitalist, patient is in need for intubation based off of ABG levels.

## 2018-10-03 NOTE — PROGRESS NOTES
PICC order acknowledged. Patient currently has altered mental status. Family friend in room stated daughter Deidre Cherry handles medical descions. Called daughter, Deidre Cherry via telephone for consent. Daughter asking for other options and alternatives other than PICC line and if PICC line was only option at this point. Informed Mariama, patient has 2 working PIV's and we could speak to physician if she was uncomfortable with having line placed. Deidre Cherry stated she would rather not have PICC line placed at this time. Spoke with Dr. Ceci Erazo, hospitalist and Dr. Nithya Parr, ED physician regarding daughter's wishes at this time. Okay to cancel PICC line. Both PIV's were assessed and patent. Esther Rivera, RN [facility] Access Nurse

## 2018-10-03 NOTE — ED NOTES
Respiratory paged at this time to prep patient for intubation. Code cart and difficult airway cart outside patient room at this time. Hospitalist and ED MD discussing plan of care for patient and deciding if intubation is neccessary at this time.

## 2018-10-03 NOTE — H&P
Hospitalist Admission Note NAME: Vanna Bro :  1940 MRN:  [id number] Date/Time:  10/3/2018 3:58 PM 
 
Patient PCP: Charlotte Leung MD 
______________________________________________________________________ Given the patient's current clinical presentation, I have a high level of concern for decompensation if discharged from the emergency department. Complex decision making was performed, which includes reviewing the patient's available past medical records, laboratory results, and x-ray films. My assessment of this patient's clinical condition and my plan of care is as follows. Assessment / Plan: AMS secondary to DKA 
-Admit to ICU 
-LA 5.6, pH 7.13, pCO 18 - therefore pt attempting to compensate for metabolic acidosis as noted by increased RR. Monitor closely as she is high risk for further decompensation. -CO2 7, AG 27,  
-Continue Insulin drip as per protocol 
-Continue NS at 125mL/hr - supposed history of HF in the EMR but no recent echo to corroborate and not on any home meds that would suggest CHF treatment 
-Continue monitoring FS q1hr - once FS drops below 250, then change fluids to D5 1/2NS 
-Repeat Chem q4hrs - the next one is due 1600 along with LA - RN aware 
-Bicarb drip Pseudohypontremia Hyperkalemia 
-Na normalized when BG taken into account 
-K 5.7 - this will rapidly correct as pt is rehydrated and continues on insulin drip 
-Once K reaches 4.5 or lower, would then add 40meq of K to the ongoing fluids - discussed this with ED RN. ONEYDA, likely secondary to dehydration 
-Continue IVF, monitor, avoid nephrotoxic meds HLD 
HTN Hypothyroid 
-Holding PO meds while pt is acutely ill - can likely be resumed tmr Abnormal EKG 
-As per ED note - EKG done via EMS was concerning for STEMI - Dr. Logan Gaviria was contacted and he did not think the EKG showed STEMI.   
-Trops 0.05 
 
 Code Status: Full - discussed this with pt's daughter over the phone. Surrogate Decision Maker: Spoke with pt's son over the phone at . Updated on severity of pt's condition and the risk of further decompensation. Answered all questions. Pt's son will inform the rest of his family. Also called  - however no one picked up. Addendum - was able to contact pt's daughter and updated her. DVT Prophylaxis: Lovenox GI Prophylaxis: not indicated Baseline: Independent, lives with her 2 sons Subjective: CHIEF COMPLAINT: high fingerstick HISTORY OF PRESENT ILLNESS:    
Raulito Hogue is a 69 y.o.  female who presents with CC listed above. Pt is currently altered and is unable to provide a reliable medical history. Contacted pt's son via the phone number that was listed and updated. Asked him if his mother had been acting differently or had any complaints recently and he was unable to definitively say. Also asked him if the pt had missed any doses of her insulin recently and he was also unclear on this. We were asked to admit for work up and evaluation of the above problems. Past Medical History:  
Diagnosis Date  Diabetes ([address]  Heart failure ([address]   
 unknown to family  Hypercholesteremia  Hypertension  Stroke ([address]  Thyroid disease Past Surgical History:  
Procedure Laterality Date  HX GYN    
 HX HEENT    
 thyroidectomy  HX HYSTERECTOMY  REMOVAL GALLBLADDER    
 THYROIDECTOMY Social History Substance Use Topics  Smoking status: Never Smoker  Smokeless tobacco: Never Used  Alcohol use No  
   Comment: been years Family History Problem Relation Age of Onset  Diabetes Father  No Known Problems Mother No Known Allergies Prior to Admission medications Medication Sig Start Date End Date Taking? Authorizing Provider insulin detemir U-100 (LEVEMIR FLEXTOUCH) 100 unit/mL (3 mL) inpn 30 units every morning3 8/31/18   Jac Rees MD  
pravastatin (PRAVACHOL) 80 mg tablet Take 80 mg by mouth daily. Stanford Devi MD  
aspirin/salicylamide/caffeine (BC HEADACHE POWDER PO) Take 1 Packet by mouth three (3) times daily as needed (back pain). Stanford Devi MD  
fenofibrate nanocrystallized (TRICOR) 145 mg tablet TAKE 1 TABLET BY MOUTH EVERY DAY 4/10/18   Jac Rees MD  
levothyroxine (SYNTHROID) 150 mcg tablet TAKE 1 TABLET BY MOUTH EVERY DAY 4/10/18   Jac Rees MD  
clopidogrel (PLAVIX) 75 mg tab TAKE 1 TAB BY MOUTH DAILY. 4/10/18   Jac Rees MD  
amLODIPine (NORVASC) 10 mg tablet TAKE 1 TABLET BY MOUTH EVERY MORNING 3/28/18   Jac Rees MD  
losartan-hydroCHLOROthiazide [facility]) 100-25 mg per tablet TAKE 1 TABLET BY MOUTH DAILY 9/22/17   Jac Rees MD  
brimonidine (ALPHAGAN) 0.15 % ophthalmic solution Administer 1 Drop to both eyes two (2) times a day. 1/30/15   Historical Provider REVIEW OF SYSTEMS:    
I am not able to complete the review of systems because: The patient is intubated and sedated  
x The patient has altered mental status due to his acute medical problems The patient has baseline aphasia from prior stroke(s) The patient has baseline dementia and is not reliable historian The patient is in acute medical distress and unable to provide information Total of 12 systems reviewed as follows:   
   POSITIVE= underlined text  Negative = text not underlined General:  fever, chills, sweats, generalized weakness, weight loss/gain,  
   loss of appetite Eyes:    blurred vision, eye pain, loss of vision, double vision ENT:    rhinorrhea, pharyngitis Respiratory:   cough, sputum production, SOB, AGUILERA, wheezing, pleuritic pain  
Cardiology:   chest pain, palpitations, orthopnea, PND, edema, syncope Gastrointestinal:  abdominal pain , N/V, diarrhea, dysphagia, constipation, bleeding Genitourinary:  frequency, urgency, dysuria, hematuria, incontinence Muskuloskeletal :  arthralgia, myalgia, back pain Hematology:  easy bruising, nose or gum bleeding, lymphadenopathy Dermatological: rash, ulceration, pruritis, color change / jaundice Endocrine:   hot flashes or polydipsia Neurological:  headache, dizziness, confusion, focal weakness, paresthesia, Speech difficulties, memory loss, gait difficulty Psychological: Feelings of anxiety, depression, agitation Objective: VITALS:   
Visit Vitals  BP (!) 140/38  Pulse 99  Temp 99 °F (37.2 °C)  Resp (!) 36  Wt 77.1 kg (170 lb)  SpO2 98%  BMI 30.11 kg/m2 PHYSICAL EXAM: 
 
General:    Drowsy, arousable HEENT: Atraumatic, anicteric sclerae, pink conjunctivae No oral ulcers, mucosa dry, throat clear, dentition fair Neck:  Supple, symmetrical,  thyroid: non tender Lungs:   Rapid shallow breaths, clear BS Chest wall:  No tenderness  No Accessory muscle use. Heart:   Regular  rhythm,  No  murmur   No edema Abdomen:   Soft, non-tender. Not distended. Bowel sounds normal 
Extremities: No cyanosis. No clubbing,   
  Skin turgor normal, Capillary refill normal, Radial dial pulse 2+ Skin:     Not pale. Not Jaundiced  No rashes Psych:  Deferred. Neurologic: Confused, intermittently follows commands, AOx1, unable to recognize friend at bedside, moving all extremities 
_______________________________________________________________________ Care Plan discussed with: 
  Comments Patient x Family  x   
RN x Care Manager Consultant:     
_______________________________________________________________________ Expected  Disposition:  
Home with Family HH/PT/OT/RN x  
SNF/LTC   
TAYA   
________________________________________________________________________ TOTAL TIME:  Minutes Critical Care Provided  75   Minutes non procedure based Comments Reviewed previous records  
>50% of visit spent in counseling and coordination of care  Discussion with patient and/or family and questions answered 
  
 
________________________________________________________________________ Signed: Konstantin Jensen MD 
 
Procedures: see electronic medical records for all procedures/Xrays and details which were not copied into this note but were reviewed prior to creation of Plan. LAB DATA REVIEWED:   
Recent Results (from the past 24 hour(s)) GLUCOSE, POC Collection Time: 10/03/18 12:43 PM  
Result Value Ref Range Glucose (POC) >600 (HH) 65 - 100 mg/dL Performed by Corrie Cruz ([facility]) GLUCOSE, POC Collection Time: 10/03/18 12:44 PM  
Result Value Ref Range Glucose (POC) >600 (HH) 65 - 100 mg/dL Performed by Corrie Cruz (PCT) EKG, 12 LEAD, INITIAL Collection Time: 10/03/18 12:48 PM  
Result Value Ref Range Ventricular Rate 91 BPM  
 Atrial Rate 91 BPM  
 P-R Interval 164 ms QRS Duration 96 ms  
 Q-T Interval 360 ms QTC Calculation (Bezet) 442 ms Calculated P Axis 69 degrees Calculated R Axis -48 degrees Calculated T Axis 54 degrees Diagnosis Normal sinus rhythm Left axis deviation Incomplete right bundle branch block Nonspecific ST abnormality Confirmed by Masha Dupree ([id number]) on 10/3/2018 3:27:14 PM 
  
POC TROPONIN-I Collection Time: 10/03/18  1:07 PM  
Result Value Ref Range Troponin-I (POC) <0.04 0.00 - 0.08 ng/mL CBC WITH AUTOMATED DIFF Collection Time: 10/03/18  1:16 PM  
Result Value Ref Range WBC 15.6 (H) 3.6 - 11.0 K/uL  
 RBC 3.28 (L) 3.80 - 5.20 M/uL  
 HGB 10.5 (L) 11.5 - 16.0 g/dL HCT 32.2 (L) 35.0 - 47.0 % MCV 98.2 80.0 - 99.0 FL  
 MCH 32.0 26.0 - 34.0 PG  
 MCHC 32.6 30.0 - 36.5 g/dL  
 RDW 13.2 11.5 - 14.5 % PLATELET 297 232 - 231 K/uL MPV 11.1 8.9 - 12.9 FL  
 NRBC 0.0 0  WBC ABSOLUTE NRBC 0.00 0.00 - 0.01 K/uL NEUTROPHILS 86 (H) 32 - 75 % LYMPHOCYTES 6 (L) 12 - 49 % MONOCYTES 6 5 - 13 % EOSINOPHILS 0 0 - 7 % BASOPHILS 0 0 - 1 % IMMATURE GRANULOCYTES 2 (H) 0.0 - 0.5 % ABS. NEUTROPHILS 13.4 (H) 1.8 - 8.0 K/UL  
 ABS. LYMPHOCYTES 0.9 0.8 - 3.5 K/UL  
 ABS. MONOCYTES 1.0 0.0 - 1.0 K/UL  
 ABS. EOSINOPHILS 0.0 0.0 - 0.4 K/UL  
 ABS. BASOPHILS 0.0 0.0 - 0.1 K/UL  
 ABS. IMM. GRANS. 0.2 (H) 0.00 - 0.04 K/UL  
 DF AUTOMATED METABOLIC PANEL, COMPREHENSIVE Collection Time: 10/03/18  1:16 PM  
Result Value Ref Range Sodium 126 (L) 136 - 145 mmol/L Potassium 6.1 (H) 3.5 - 5.1 mmol/L Chloride 92 (L) 97 - 108 mmol/L  
 CO2 9 (LL) 21 - 32 mmol/L Anion gap 25 (H) 5 - 15 mmol/L Glucose 971 (HH) 65 - 100 mg/dL BUN 54 (H) 6 - 20 MG/DL Creatinine 1.89 (H) 0.55 - 1.02 MG/DL  
 BUN/Creatinine ratio 29 (H) 12 - 20 GFR est AA 31 (L) >60 ml/min/1.73m2 GFR est non-AA 26 (L) >60 ml/min/1.73m2 Calcium 8.8 8.5 - 10.1 MG/DL Bilirubin, total 0.5 0.2 - 1.0 MG/DL  
 ALT (SGPT) 31 12 - 78 U/L  
 AST (SGOT) 30 15 - 37 U/L Alk. phosphatase 144 (H) 45 - 117 U/L Protein, total 6.5 6.4 - 8.2 g/dL Albumin 3.5 3.5 - 5.0 g/dL Globulin 3.0 2.0 - 4.0 g/dL A-G Ratio 1.2 1.1 - 2.2    
TROPONIN I Collection Time: 10/03/18  1:16 PM  
Result Value Ref Range Troponin-I, Qt. 0.05 (H) <0.05 ng/mL CK W/ REFLX CKMB Collection Time: 10/03/18  1:16 PM  
Result Value Ref Range  26 - 192 U/L  
LACTIC ACID Collection Time: 10/03/18  1:16 PM  
Result Value Ref Range Lactic acid 5.6 (HH) 0.4 - 2.0 MMOL/L  
URINALYSIS W/ REFLEX CULTURE Collection Time: 10/03/18  1:16 PM  
Result Value Ref Range Color YELLOW/STRAW Appearance CLEAR CLEAR Specific gravity 1.027 1.003 - 1.030    
 pH (UA) 5.0 5.0 - 8.0 Protein NEGATIVE  NEG mg/dL Glucose >1000 (A) NEG mg/dL Ketone 40 (A) NEG mg/dL Bilirubin NEGATIVE  NEG Blood NEGATIVE  NEG Urobilinogen 0.2 0.2 - 1.0 EU/dL Nitrites NEGATIVE  NEG Leukocyte Esterase NEGATIVE  NEG    
 WBC 0-4 0 - 4 /hpf  
 RBC 0-5 0 - 5 /hpf Epithelial cells FEW FEW /lpf Bacteria NEGATIVE  NEG /hpf  
 UA:UC IF INDICATED CULTURE NOT INDICATED BY UA RESULT CNI Amorphous Crystals 1+ (A) NEG Hyaline cast 0-2 0 - 5 /lpf  
LIPASE Collection Time: 10/03/18  1:16 PM  
Result Value Ref Range Lipase 35 (L) 73 - 393 U/L MAGNESIUM Collection Time: 10/03/18  1:16 PM  
Result Value Ref Range Magnesium 2.6 (H) 1.6 - 2.4 mg/dL METABOLIC PANEL, BASIC Collection Time: 10/03/18  2:07 PM  
Result Value Ref Range Sodium 128 (L) 136 - 145 mmol/L Potassium 5.7 (H) 3.5 - 5.1 mmol/L Chloride 94 (L) 97 - 108 mmol/L  
 CO2 7 (LL) 21 - 32 mmol/L Anion gap 27 (H) 5 - 15 mmol/L Glucose 971 (HH) 65 - 100 mg/dL BUN 55 (H) 6 - 20 MG/DL Creatinine 1.92 (H) 0.55 - 1.02 MG/DL  
 BUN/Creatinine ratio 29 (H) 12 - 20 GFR est AA 31 (L) >60 ml/min/1.73m2 GFR est non-AA 25 (L) >60 ml/min/1.73m2 Calcium 8.6 8.5 - 10.1 MG/DL  
PHOSPHORUS Collection Time: 10/03/18  2:07 PM  
Result Value Ref Range Phosphorus 9.0 (H) 2.6 - 4.7 MG/DL PROTHROMBIN TIME + INR Collection Time: 10/03/18  2:08 PM  
Result Value Ref Range INR 1.1 0.9 - 1.1 Prothrombin time 11.4 (H) 9.0 - 11.1 sec PTT Collection Time: 10/03/18  2:08 PM  
Result Value Ref Range aPTT 22.2 22.1 - 32.0 sec  
 aPTT, therapeutic range     58.0 - 77.0 SECS  
GLUCOSE, POC Collection Time: 10/03/18  2:46 PM  
Result Value Ref Range Glucose (POC) >600 (HH) 65 - 100 mg/dL Performed by Tyraashley Sam

## 2018-10-04 ENCOUNTER — APPOINTMENT (OUTPATIENT)
Dept: GENERAL RADIOLOGY | Age: 78
DRG: 637 | End: 2018-10-04
Attending: INTERNAL MEDICINE
Payer: MEDICARE

## 2018-10-04 LAB
ALBUMIN SERPL-MCNC: 2.9 G/DL (ref 3.5–5)
ALBUMIN/GLOB SERPL: 1 {RATIO} (ref 1.1–2.2)
ALP SERPL-CCNC: 62 U/L (ref 45–117)
ALT SERPL-CCNC: 32 U/L (ref 12–78)
ANION GAP SERPL CALC-SCNC: 10 MMOL/L (ref 5–15)
ANION GAP SERPL CALC-SCNC: 10 MMOL/L (ref 5–15)
ANION GAP SERPL CALC-SCNC: 8 MMOL/L (ref 5–15)
ANION GAP SERPL CALC-SCNC: 8 MMOL/L (ref 5–15)
ARTERIAL PATENCY WRIST A: ABNORMAL
AST SERPL-CCNC: 41 U/L (ref 15–37)
ATRIAL RATE: 90 BPM
BASE DEFICIT BLDA-SCNC: 3.3 MMOL/L
BASOPHILS # BLD: 0 K/UL (ref 0–0.1)
BASOPHILS NFR BLD: 0 % (ref 0–1)
BDY SITE: ABNORMAL
BILIRUB SERPL-MCNC: 0.3 MG/DL (ref 0.2–1)
BUN SERPL-MCNC: 40 MG/DL (ref 6–20)
BUN SERPL-MCNC: 40 MG/DL (ref 6–20)
BUN SERPL-MCNC: 41 MG/DL (ref 6–20)
BUN SERPL-MCNC: 45 MG/DL (ref 6–20)
BUN/CREAT SERPL: 32 (ref 12–20)
BUN/CREAT SERPL: 34 (ref 12–20)
CALCIUM SERPL-MCNC: 8.2 MG/DL (ref 8.5–10.1)
CALCIUM SERPL-MCNC: 8.3 MG/DL (ref 8.5–10.1)
CALCIUM SERPL-MCNC: 8.3 MG/DL (ref 8.5–10.1)
CALCIUM SERPL-MCNC: 8.6 MG/DL (ref 8.5–10.1)
CALCULATED P AXIS, ECG09: 32 DEGREES
CALCULATED R AXIS, ECG10: -10 DEGREES
CALCULATED T AXIS, ECG11: -68 DEGREES
CHLORIDE SERPL-SCNC: 109 MMOL/L (ref 97–108)
CHLORIDE SERPL-SCNC: 113 MMOL/L (ref 97–108)
CO2 SERPL-SCNC: 19 MMOL/L (ref 21–32)
CO2 SERPL-SCNC: 20 MMOL/L (ref 21–32)
CO2 SERPL-SCNC: 22 MMOL/L (ref 21–32)
CO2 SERPL-SCNC: 22 MMOL/L (ref 21–32)
CREAT SERPL-MCNC: 1.16 MG/DL (ref 0.55–1.02)
CREAT SERPL-MCNC: 1.19 MG/DL (ref 0.55–1.02)
CREAT SERPL-MCNC: 1.2 MG/DL (ref 0.55–1.02)
CREAT SERPL-MCNC: 1.39 MG/DL (ref 0.55–1.02)
DIAGNOSIS, 93000: NORMAL
DIFFERENTIAL METHOD BLD: ABNORMAL
EOSINOPHIL # BLD: 0 K/UL (ref 0–0.4)
EOSINOPHIL NFR BLD: 0 % (ref 0–7)
ERYTHROCYTE [DISTWIDTH] IN BLOOD BY AUTOMATED COUNT: 13.2 % (ref 11.5–14.5)
GLOBULIN SER CALC-MCNC: 2.8 G/DL (ref 2–4)
GLUCOSE BLD STRIP.AUTO-MCNC: 101 MG/DL (ref 65–100)
GLUCOSE BLD STRIP.AUTO-MCNC: 101 MG/DL (ref 65–100)
GLUCOSE BLD STRIP.AUTO-MCNC: 127 MG/DL (ref 65–100)
GLUCOSE BLD STRIP.AUTO-MCNC: 169 MG/DL (ref 65–100)
GLUCOSE BLD STRIP.AUTO-MCNC: 176 MG/DL (ref 65–100)
GLUCOSE BLD STRIP.AUTO-MCNC: 205 MG/DL (ref 65–100)
GLUCOSE BLD STRIP.AUTO-MCNC: 237 MG/DL (ref 65–100)
GLUCOSE BLD STRIP.AUTO-MCNC: 246 MG/DL (ref 65–100)
GLUCOSE BLD STRIP.AUTO-MCNC: 252 MG/DL (ref 65–100)
GLUCOSE BLD STRIP.AUTO-MCNC: 254 MG/DL (ref 65–100)
GLUCOSE BLD STRIP.AUTO-MCNC: 257 MG/DL (ref 65–100)
GLUCOSE BLD STRIP.AUTO-MCNC: 296 MG/DL (ref 65–100)
GLUCOSE BLD STRIP.AUTO-MCNC: 314 MG/DL (ref 65–100)
GLUCOSE BLD STRIP.AUTO-MCNC: 374 MG/DL (ref 65–100)
GLUCOSE BLD STRIP.AUTO-MCNC: 63 MG/DL (ref 65–100)
GLUCOSE BLD STRIP.AUTO-MCNC: 77 MG/DL (ref 65–100)
GLUCOSE BLD STRIP.AUTO-MCNC: 80 MG/DL (ref 65–100)
GLUCOSE BLD STRIP.AUTO-MCNC: 87 MG/DL (ref 65–100)
GLUCOSE BLD STRIP.AUTO-MCNC: 97 MG/DL (ref 65–100)
GLUCOSE SERPL-MCNC: 152 MG/DL (ref 65–100)
GLUCOSE SERPL-MCNC: 206 MG/DL (ref 65–100)
GLUCOSE SERPL-MCNC: 208 MG/DL (ref 65–100)
GLUCOSE SERPL-MCNC: 278 MG/DL (ref 65–100)
HCO3 BLDA-SCNC: 20 MMOL/L (ref 22–26)
HCT VFR BLD AUTO: 28.1 % (ref 35–47)
HGB BLD-MCNC: 9.7 G/DL (ref 11.5–16)
IMM GRANULOCYTES # BLD: 0.1 K/UL (ref 0–0.04)
IMM GRANULOCYTES NFR BLD AUTO: 0 % (ref 0–0.5)
LYMPHOCYTES # BLD: 1 K/UL (ref 0.8–3.5)
LYMPHOCYTES NFR BLD: 7 % (ref 12–49)
MAGNESIUM SERPL-MCNC: 2.3 MG/DL (ref 1.6–2.4)
MCH RBC QN AUTO: 31.7 PG (ref 26–34)
MCHC RBC AUTO-ENTMCNC: 34.5 G/DL (ref 30–36.5)
MCV RBC AUTO: 91.8 FL (ref 80–99)
MONOCYTES # BLD: 1.7 K/UL (ref 0–1)
MONOCYTES NFR BLD: 12 % (ref 5–13)
NEUTS SEG # BLD: 11.6 K/UL (ref 1.8–8)
NEUTS SEG NFR BLD: 81 % (ref 32–75)
NRBC # BLD: 0 K/UL (ref 0–0.01)
NRBC BLD-RTO: 0 PER 100 WBC
P-R INTERVAL, ECG05: 142 MS
PCO2 BLDA: 30 MMHG (ref 35–45)
PH BLDA: 7.44 [PH] (ref 7.35–7.45)
PHOSPHATE SERPL-MCNC: 3.7 MG/DL (ref 2.6–4.7)
PLATELET # BLD AUTO: 227 K/UL (ref 150–400)
PMV BLD AUTO: 10.1 FL (ref 8.9–12.9)
PO2 BLDA: 69 MMHG (ref 80–100)
POTASSIUM SERPL-SCNC: 3.9 MMOL/L (ref 3.5–5.1)
POTASSIUM SERPL-SCNC: 4.2 MMOL/L (ref 3.5–5.1)
POTASSIUM SERPL-SCNC: 4.3 MMOL/L (ref 3.5–5.1)
POTASSIUM SERPL-SCNC: 4.6 MMOL/L (ref 3.5–5.1)
PROT SERPL-MCNC: 5.7 G/DL (ref 6.4–8.2)
Q-T INTERVAL, ECG07: 344 MS
QRS DURATION, ECG06: 86 MS
QTC CALCULATION (BEZET), ECG08: 420 MS
RBC # BLD AUTO: 3.06 M/UL (ref 3.8–5.2)
SAO2 % BLD: 95 % (ref 92–97)
SAO2% DEVICE SAO2% SENSOR NAME: ABNORMAL
SERVICE CMNT-IMP: ABNORMAL
SERVICE CMNT-IMP: NORMAL
SODIUM SERPL-SCNC: 139 MMOL/L (ref 136–145)
SODIUM SERPL-SCNC: 142 MMOL/L (ref 136–145)
SODIUM SERPL-SCNC: 143 MMOL/L (ref 136–145)
SODIUM SERPL-SCNC: 143 MMOL/L (ref 136–145)
SPECIMEN SITE: ABNORMAL
VENTRICULAR RATE, ECG03: 90 BPM
WBC # BLD AUTO: 14.4 K/UL (ref 3.6–11)

## 2018-10-04 PROCEDURE — 77030011943

## 2018-10-04 PROCEDURE — 80048 BASIC METABOLIC PNL TOTAL CA: CPT | Performed by: GENERAL ACUTE CARE HOSPITAL

## 2018-10-04 PROCEDURE — 82962 GLUCOSE BLOOD TEST: CPT

## 2018-10-04 PROCEDURE — 74011000258 HC RX REV CODE- 258: Performed by: INTERNAL MEDICINE

## 2018-10-04 PROCEDURE — 51798 US URINE CAPACITY MEASURE: CPT

## 2018-10-04 PROCEDURE — 71045 X-RAY EXAM CHEST 1 VIEW: CPT

## 2018-10-04 PROCEDURE — 74011250636 HC RX REV CODE- 250/636: Performed by: EMERGENCY MEDICINE

## 2018-10-04 PROCEDURE — 65270000015 HC RM PRIVATE ONCOLOGY

## 2018-10-04 PROCEDURE — 93005 ELECTROCARDIOGRAM TRACING: CPT

## 2018-10-04 PROCEDURE — 82803 BLOOD GASES ANY COMBINATION: CPT | Performed by: INTERNAL MEDICINE

## 2018-10-04 PROCEDURE — 74011250636 HC RX REV CODE- 250/636: Performed by: GENERAL ACUTE CARE HOSPITAL

## 2018-10-04 PROCEDURE — 80053 COMPREHEN METABOLIC PANEL: CPT | Performed by: INTERNAL MEDICINE

## 2018-10-04 PROCEDURE — 84100 ASSAY OF PHOSPHORUS: CPT | Performed by: INTERNAL MEDICINE

## 2018-10-04 PROCEDURE — 36415 COLL VENOUS BLD VENIPUNCTURE: CPT | Performed by: INTERNAL MEDICINE

## 2018-10-04 PROCEDURE — 85025 COMPLETE CBC W/AUTO DIFF WBC: CPT | Performed by: INTERNAL MEDICINE

## 2018-10-04 PROCEDURE — 74011250637 HC RX REV CODE- 250/637: Performed by: INTERNAL MEDICINE

## 2018-10-04 PROCEDURE — 36600 WITHDRAWAL OF ARTERIAL BLOOD: CPT | Performed by: INTERNAL MEDICINE

## 2018-10-04 PROCEDURE — 74011636637 HC RX REV CODE- 636/637: Performed by: EMERGENCY MEDICINE

## 2018-10-04 PROCEDURE — 74011000250 HC RX REV CODE- 250: Performed by: EMERGENCY MEDICINE

## 2018-10-04 PROCEDURE — 83735 ASSAY OF MAGNESIUM: CPT | Performed by: INTERNAL MEDICINE

## 2018-10-04 PROCEDURE — 74011636637 HC RX REV CODE- 636/637: Performed by: INTERNAL MEDICINE

## 2018-10-04 PROCEDURE — 74011000258 HC RX REV CODE- 258: Performed by: EMERGENCY MEDICINE

## 2018-10-04 RX ORDER — DEXTROSE 50 % IN WATER (D50W) INTRAVENOUS SYRINGE
12.5-25 AS NEEDED
Status: DISCONTINUED | OUTPATIENT
Start: 2018-10-04 | End: 2018-10-04

## 2018-10-04 RX ORDER — AMLODIPINE BESYLATE 5 MG/1
10 TABLET ORAL DAILY
Status: DISCONTINUED | OUTPATIENT
Start: 2018-10-04 | End: 2018-10-09 | Stop reason: HOSPADM

## 2018-10-04 RX ORDER — MAGNESIUM SULFATE 100 %
4 CRYSTALS MISCELLANEOUS AS NEEDED
Status: DISCONTINUED | OUTPATIENT
Start: 2018-10-04 | End: 2018-10-04

## 2018-10-04 RX ORDER — HYDROCHLOROTHIAZIDE 25 MG/1
25 TABLET ORAL DAILY
Status: DISCONTINUED | OUTPATIENT
Start: 2018-10-05 | End: 2018-10-05

## 2018-10-04 RX ORDER — AMLODIPINE BESYLATE 5 MG/1
10 TABLET ORAL DAILY
Status: DISCONTINUED | OUTPATIENT
Start: 2018-10-05 | End: 2018-10-04

## 2018-10-04 RX ORDER — SODIUM CHLORIDE 450 MG/100ML
75 INJECTION, SOLUTION INTRAVENOUS CONTINUOUS
Status: DISCONTINUED | OUTPATIENT
Start: 2018-10-04 | End: 2018-10-06

## 2018-10-04 RX ORDER — INSULIN LISPRO 100 [IU]/ML
INJECTION, SOLUTION INTRAVENOUS; SUBCUTANEOUS
Status: DISCONTINUED | OUTPATIENT
Start: 2018-10-04 | End: 2018-10-05

## 2018-10-04 RX ORDER — LOSARTAN POTASSIUM 100 MG/1
100 TABLET ORAL DAILY
Status: DISCONTINUED | OUTPATIENT
Start: 2018-10-05 | End: 2018-10-09 | Stop reason: HOSPADM

## 2018-10-04 RX ORDER — INSULIN GLARGINE 100 [IU]/ML
24 INJECTION, SOLUTION SUBCUTANEOUS DAILY
Status: DISCONTINUED | OUTPATIENT
Start: 2018-10-04 | End: 2018-10-05

## 2018-10-04 RX ADMIN — SODIUM CHLORIDE 100 ML/HR: 450 INJECTION, SOLUTION INTRAVENOUS at 10:15

## 2018-10-04 RX ADMIN — INSULIN LISPRO 2 UNITS: 100 INJECTION, SOLUTION INTRAVENOUS; SUBCUTANEOUS at 21:54

## 2018-10-04 RX ADMIN — INSULIN LISPRO 3 UNITS: 100 INJECTION, SOLUTION INTRAVENOUS; SUBCUTANEOUS at 17:14

## 2018-10-04 RX ADMIN — DEXTROSE MONOHYDRATE 9 ML: 25 INJECTION, SOLUTION INTRAVENOUS at 01:43

## 2018-10-04 RX ADMIN — Medication 10 ML: at 15:08

## 2018-10-04 RX ADMIN — DEXTROSE MONOHYDRATE 8 ML: 25 INJECTION, SOLUTION INTRAVENOUS at 02:00

## 2018-10-04 RX ADMIN — Medication 10 ML: at 21:55

## 2018-10-04 RX ADMIN — DEXTROSE MONOHYDRATE, SODIUM CHLORIDE, AND POTASSIUM CHLORIDE 100 ML/HR: 50; 4.5; 1.49 INJECTION, SOLUTION INTRAVENOUS at 00:40

## 2018-10-04 RX ADMIN — SODIUM CHLORIDE 0.4 UNITS/HR: 900 INJECTION, SOLUTION INTRAVENOUS at 03:13

## 2018-10-04 RX ADMIN — INSULIN GLARGINE 24 UNITS: 100 INJECTION, SOLUTION SUBCUTANEOUS at 10:15

## 2018-10-04 RX ADMIN — ENOXAPARIN SODIUM 30 MG: 30 INJECTION, SOLUTION INTRAVENOUS; SUBCUTANEOUS at 17:14

## 2018-10-04 RX ADMIN — Medication 10 ML: at 02:01

## 2018-10-04 RX ADMIN — AMLODIPINE BESYLATE 10 MG: 5 TABLET ORAL at 11:33

## 2018-10-04 RX ADMIN — Medication 10 ML: at 06:45

## 2018-10-04 NOTE — PROGRESS NOTES
Critical care interdisciplinary rounds held on 10/4/18. Following members present, Pharmacy, Diabetes Treatment, Case Management, Respiratory Therapy, Physical Therapy  and Nutrition. Led by Nadeem Reid. Tito Acosta RN and Dr. Gray Montenegro. Plan of care discussed. See clinical pathway for plan of care and interventions and desired outcomes. Adjustments made to glucose coverage.

## 2018-10-04 NOTE — PROGRESS NOTES
Care Management: 
 
Reason for Admission:   DKA and placed on insulin gtt and transferred to ICU. She is feeling much better this afternoon and able to talk with care manager. Estefania Mcnair is at bedside. Patient is active with her PCP. SILVERIO Matos Emailed of admit. RRAT Score:     16 Do you (patient/family) have any concerns for transition/discharge? Patient has gotten forgetful with her med's. DTR and patient agreeable to home health at discharge and [facility] for [address] placed on chart. DTR verbalizes understanding sons are going to need to be more active with medications for patient. Plan for utilizing home health:     FOC placed on chart in anticipation of discharge needs. Likelihood of readmission?   moderate Transition of Care Plan:      Home with family and [facility] and follow up with PCP. Care Management Interventions PCP Verified by CM: Yes Mode of Transport at Discharge: Other (see comment) (Family) Current Support Network: Other (Lives in a split level home with two sons. She has a glucometer . She is independent with ADL's. She no longer drives. ) Confirm Follow Up Transport: Family (Family assists with transport needs. ) Plan discussed with Pt/Family/Caregiver: Yes (Met with patient and her DTR at bedside. ) [place] of Choice Offered: Yes Discharge Location Discharge Placement: Home with home health (Anticipate HH needs at discharge and [facility] for [facility] [place] [facility] INPATIENT placed on chart. ) Kartik Morrow [facility] ac [phone]

## 2018-10-04 NOTE — PROGRESS NOTES
0700-Bedside shift change report given to Inés Ling (oncoming nurse) by Juan May (offgoing nurse). Report included the following information SBAR, Kardex and MAR.  
3997- Patient assessed. See flowsheet. 0800- Dr. Tierney Soto at bedside. 3588- Patient bathed. Labs drawn before patient began eating. 1015- Lantus given. Patient's tray is gone and patient is unreliable to tell me how much she ate due to confusion. Did not give carb coverage for breakfast. Insulin gtt will stop at 1215 per insulin transition protocol. 1102- Reassessed. No changes. Spoke with Dr. Tara Hurd about BP. He will look into restarting her home medications. 1222-TRANSFER - OUT REPORT: 
 
Verbal report given to Jorge aH (name) on Wero López  being transferred to Oncology (unit) for routine progression of care Report consisted of patients Situation, Background, Assessment and  
Recommendations(SBAR). Information from the following report(s) SBAR, Kardex and MAR was reviewed with the receiving nurse. Lines:  
Peripheral IV 10/03/18 Left Hand (Active) Site Assessment Clean, dry, & intact 10/4/2018  7:00 AM  
Phlebitis Assessment 0 10/4/2018  7:00 AM  
Infiltration Assessment 0 10/4/2018  7:00 AM  
Dressing Status Clean, dry, & intact 10/4/2018  7:00 AM  
Dressing Type Tape;Transparent 10/4/2018  7:00 AM  
Hub Color/Line Status Pink; Infusing 10/4/2018  7:00 AM  
   
Peripheral IV 10/03/18 Right Wrist (Active) Site Assessment Clean, dry, & intact 10/4/2018  7:00 AM  
Phlebitis Assessment 0 10/4/2018  7:00 AM  
Infiltration Assessment 0 10/4/2018  7:00 AM  
Dressing Status Clean, dry, & intact 10/4/2018  7:00 AM  
Dressing Type Tape;Transparent 10/4/2018  7:00 AM  
Hub Color/Line Status Pink; Infusing 10/4/2018  4:22 AM  
   
Peripheral IV 10/03/18 Left Antecubital (Active) Site Assessment Clean, dry, & intact 10/4/2018  7:00 AM  
Phlebitis Assessment 0 10/4/2018  7:00 AM  
Infiltration Assessment 0 10/4/2018  7:00 AM  
 Dressing Status Clean, dry, & intact 10/4/2018  7:00 AM  
Dressing Type Tape;Transparent 10/4/2018  7:00 AM  
Hub Color/Line Status Pink 10/4/2018  7:00 AM  
  
 
Opportunity for questions and clarification was provided. Patient transported with: 
 Registered Nurse Insulin gtt discontinued.

## 2018-10-04 NOTE — PROGRESS NOTES
10/4/2018 INTENSIVIST PROGRESS NOTE:  
 
Patient seen and evaluated, chart reviewed. Admitted with DKA. Was about to be intubated for lethargy. She was confused but arousable and mumbling a few words \"I'm OK\". Dr Sergei Chavez discontinued plans for intubation. Started on IVF, insulin drip Today pt in ccu awake, alert No acute distress No acute complaints Visit Vitals  /41  Pulse 89  Temp 99.1 °F (37.3 °C)  Resp 24  Wt 77.1 kg (170 lb)  SpO2 100%  BMI 30.11 kg/m2 General: no distress Eyes: anicteric HEENT: dry mucus membranes CV: RRR, no murmur Lungs: Few ronchi Abd: soft, +BS Ext: no cyanosis, no clubbing Skin: warm and dry Musculoskeletal: no gross deformities Neuro: non focal 
 
CXR: no acute process Labs reviewed A/P: 
- DKA and hyperosmolar state - insulin drip, IV fluids per protocol  
- lethargy - resolved 
- acute renal failure - IV fluids, renal fx better 
- replete electrolytes as needed 
- DVT prophylaxis 
- mobilize 
- advance diet 
- transfer to floor after off insulin drip Tyler Ngo MD

## 2018-10-04 NOTE — PROGRESS NOTES
Oncology Nursing Communication Tool 
3:26 PM 
10/4/2018 \"Bedside\" shift change report given to Garett Clayton (incoming nurse) by Eric Ball (outgoing nurse) on Esther Flies. Report included the following information Shift Summary: complaints of headache, A&o x2-3, disoriented to time Issues for physician to address: none at this time Oncology Shift Note Admission Date 10/3/2018 Admission Diagnosis DKA (diabetic ketoacidoses) ([facility] [address] Code Status Full Code Consults None Cardiac Monitoring [] Yes [] No  
  
Purposeful Hourly Rounding [] Yes   
Tyler Score Total Score: 5 Tyler score 3 or > [] Bed Alarm [] Avasys [] 1:1 sitter [] Patient refused (Place signed refusal form in chart) Pain Managed [] Yes [] No  
 Key Pain Meds   
    
  
 aspirin/salicylamide/caffeine (BC HEADACHE POWDER PO) Take 1 Packet by mouth three (3) times daily as needed (back pain). Influenza Vaccine Received Flu Vaccine for Current Season (usually Sept-March): No  
 Patient/Guardian Refused (Notify MD): Yes Oxygen needs? [] Room air Oxygen @  []1L    []2L    []3L   []4L    []5L   []6L Use home O2? [] Yes [] No 
Perform O2 challenge test using  smartphrase (.oxygenchallenge) Last bowel movement Last Bowel Movement Date: 10/04/18 
bowel movement Urinary Catheter External Female Catheter 10/03/18-Urine Output (mL): 0 ml LDAs Peripheral IV 10/03/18 Left Hand (Active) Site Assessment Clean, dry, & intact 10/4/2018  1:04 PM  
Phlebitis Assessment 0 10/4/2018  1:04 PM  
Infiltration Assessment 0 10/4/2018  1:04 PM  
Dressing Status Clean, dry, & intact 10/4/2018  1:04 PM  
Dressing Type Transparent;Tape 10/4/2018  1:04 PM  
Hub Color/Line Status Pink;Capped 10/4/2018  1:04 PM  
   
Peripheral IV 10/03/18 Right Wrist (Active) Site Assessment Clean, dry, & intact 10/4/2018  1:04 PM  
Phlebitis Assessment 0 10/4/2018  1:04 PM  
 Infiltration Assessment 0 10/4/2018  1:04 PM  
Dressing Status Clean, dry, & intact 10/4/2018  1:04 PM  
Dressing Type Transparent;Tape 10/4/2018  1:04 PM  
Hub Color/Line Status Capped;Pink 10/4/2018  1:04 PM  
   
Peripheral IV 10/03/18 Left Antecubital (Active) Site Assessment Clean, dry, & intact 10/4/2018  1:04 PM  
Phlebitis Assessment 0 10/4/2018  1:04 PM  
Infiltration Assessment 0 10/4/2018  1:04 PM  
Dressing Status Clean, dry, & intact 10/4/2018  1:04 PM  
Dressing Type Transparent;Tape 10/4/2018  1:04 PM  
Hub Color/Line Status Green;Capped 10/4/2018  1:04 PM  
      
External Female Catheter 10/03/18 (Active) Site Assessment Clean, dry, & intact 10/4/2018  7:00 AM  
Repositioned Yes 10/4/2018  7:00 AM  
Perineal Care Yes 10/4/2018  8:58 AM  
Wick Changed No 10/4/2018  4:22 AM  
Suction Canister/Tubing Changed No 10/4/2018  4:22 AM  
Urine Output (mL) 0 ml 10/3/2018 11:44 PM  
            
  
Readmission Risk Assessment Tool Score Medium Risk 12 Total Score 3 Has Seen PCP in Last 6 Months (Yes=3, No=0)  
 4 IP Visits Last 12 Months (1-3=4, 4=9, >4=11) 5 Pt. Coverage (Medicare=5 , Medicaid, or Self-Pay=4) 4 Charlson Comorbidity Score (Age + Comorbid Conditions) Criteria that do not apply:  
 . Living with Significant Other. Assisted Living. LTAC. SNF. or  
Rehab Patient Length of Stay (>5 days = 3) Expected Length of Stay 2d 21h Actual Length of Stay 1 [facility]

## 2018-10-04 NOTE — CDMP QUERY
Please clarify if this patient is (was) being treated/managed for:  
 
=> Metabolic encephalopathy in the setting of DKA, AMS requiring iv ns 2 liter bolus,iv insulin, iv sodium bicarb 
=> Other explanation of clinical findings 
=> Clinically Undetermined (no explanation for clinical findings) The medical record reflects the following clinical findings, treatment, and risk factors. Risk Factors:  DKA Clinical Indicators: lethargic,  electrolyte imbalances, glucose 971, H&P \"AMS secondary to DKA\" PN 10/3 2342 \"Pt less drowsy. Oriented to person and place\" Treatment: iv ns x 2 liter bolus, iv insulin, iv sodium bicarb, lab monitoring Please clarify and document your clinical opinion in the progress notes and discharge summary including the definitive and/or presumptive diagnosis, (suspected or probable), related to the above clinical findings. Please include clinical findings supporting your diagnosis. Thank You Smitha Tapia, [address] [facility] 
   [phone]

## 2018-10-04 NOTE — DIABETES MGMT
D[facility] Consult Note Recommendations/ Comments: Pt discussed in CCU rounds with rounding team and Dr. Jean-Paul Richards. Plan to transition off insulin gtt to lantus 24 units daily and humalog correction high sensitivity, D5 IVF discontinued. Pt was seen by D[facility] in August.  A1c down from 10.9% at that time. Current hospital DM medication: insulin gtt DTC will continue to follow patient as needed. ____________________________ Consult received for:   []           Hospital Medication Recommendations [x]           Hospital Blood Glucose Management Chart reviewed and initial evaluation complete on Deana Chase. Patient is a 69 y.o. female with known Type 2 Diabetes on levemir 30 units daily at home. A1c:  
Lab Results Component Value Date/Time Hemoglobin A1c 10.1 (H) 10/03/2018 01:16 PM  
 
 
Recent Glucose Results:  
Lab Results Component Value Date/Time  (H) 10/04/2018 09:15 AM  
  (H) 10/04/2018 06:24 AM  
  (H) 10/04/2018 02:12 AM  
 GLUCPOC 205 (H) 10/04/2018 09:17 AM  
 GLUCPOC 246 (H) 10/04/2018 08:12 AM  
 GLUCPOC 252 (H) 10/04/2018 07:22 AM  
  
 
Lab Results Component Value Date/Time Creatinine 1.39 (H) 10/04/2018 09:15 AM  
 
 
Active Orders Diet DIET DIABETIC CONSISTENT CARB Regular PO intake: No data found. Thank you. Tyler Alas, MAJORN, RN, CDE [facility]

## 2018-10-04 NOTE — PROGRESS NOTES
General Daily Progress Note Admit Date: 10/3/2018 Subjective:  
 
Patient has no complaint--confused. Current Facility-Administered Medications Medication Dose Route Frequency  sodium chloride (NS) flush 5-10 mL  5-10 mL IntraVENous Q8H  
 sodium chloride (NS) flush 5-10 mL  5-10 mL IntraVENous PRN  
 insulin regular (NOVOLIN R, HUMULIN R) 100 Units in 0.9% sodium chloride 100 mL infusion  0-50 Units/hr IntraVENous TITRATE  insulin lispro (HUMALOG) injection   SubCUTAneous TIDAC  glucose chewable tablet 16 g  4 Tab Oral PRN  
 dextrose (D50W) injection syrg 12.5-25 g  25-50 mL IntraVENous PRN  
 glucagon (GLUCAGEN) injection 1 mg  1 mg IntraMUSCular PRN  
 sodium chloride (NS) flush 5-10 mL  5-10 mL IntraVENous Q8H  
 sodium chloride (NS) flush 5-10 mL  5-10 mL IntraVENous PRN  
 enoxaparin (LOVENOX) injection 30 mg  30 mg SubCUTAneous Q24H  
 dextrose 5% - 0.45% NaCl with KCl 20 mEq/L infusion  100 mL/hr IntraVENous CONTINUOUS Review of Systems Review of systems not obtained due to patient factors. Objective:  
 
Patient Vitals for the past 24 hrs: 
 BP Temp Pulse Resp SpO2 Weight 10/04/18 0700 132/45 99.1 °F (37.3 °C) 86 25 100 % -  
10/04/18 0600 126/45 - 84 24 100 % -  
10/04/18 0500 111/42 - 82 20 100 % -  
10/04/18 0422 113/42 98.7 °F (37.1 °C) 81 19 99 % -  
10/04/18 0400 98/41 - 80 22 100 % -  
10/04/18 0300 109/41 - 88 20 100 % -  
10/04/18 0200 111/46 - 82 20 100 % -  
10/04/18 0100 131/45 - 91 24 99 % -  
10/03/18 2342 125/52 98.8 °F (37.1 °C) 85 24 100 % -  
10/03/18 2300 144/53 - 87 21 100 % -  
10/03/18 2200 141/62 - 86 24 100 % -  
10/03/18 2100 126/47 - 86 17 100 % -  
10/03/18 2000 117/43 - 86 20 100 % -  
10/03/18 1946 134/47 98.5 °F (36.9 °C) 88 20 100 % -  
10/03/18 1900 125/50 - 89 21 100 % -  
10/03/18 1800 126/46 - 94 (!) 31 99 % -  
10/03/18 1758 - - - - 100 % -  
10/03/18 1734 137/44 - (!) 102 26 100 % -  
 10/03/18 1733 137/44 97.7 °F (36.5 °C) 98 25 99 % -  
10/03/18 1502 (!) 140/38 - 99 (!) 36 98 % -  
10/03/18 1430 92/61 - 97 (!) 37 98 % -  
10/03/18 1400 92/72 - 100 29 98 % -  
10/03/18 1331 114/51 99 °F (37.2 °C) 94 29 98 % -  
10/03/18 1245 124/44 98.1 °F (36.7 °C) 95 20 95 % 170 lb (77.1 kg) 10/02 1901 - 10/04 0700 In: 1998.2 [I.V.:1998.2] Out: 1200 [Urine:1200] Physical Exam:  
Visit Vitals  /45  Pulse 86  Temp 99.1 °F (37.3 °C)  Resp 25  Wt 170 lb (77.1 kg) Comment: Weight 3 weeks ago  SpO2 100%  BMI 30.11 kg/m2 General appearance: alert, cooperative, no distress, appears stated age, confused Neck: supple, symmetrical, trachea midline, no adenopathy, thyroid: not enlarged, symmetric, no tenderness/mass/nodules, no carotid bruit and no JVD Lungs: clear to auscultation bilaterally Heart: regular rate and rhythm, S1, S2 normal, no murmur, click, rub or gallop Extremities: extremities normal, atraumatic, no cyanosis or edema Data Review Recent Results (from the past 24 hour(s)) GLUCOSE, POC Collection Time: 10/03/18 12:43 PM  
Result Value Ref Range Glucose (POC) >600 (HH) 65 - 100 mg/dL Performed by [facility] (PCT) GLUCOSE, POC Collection Time: 10/03/18 12:44 PM  
Result Value Ref Range Glucose (POC) >600 (HH) 65 - 100 mg/dL Performed by [facility] (PCT) EKG, 12 LEAD, INITIAL Collection Time: 10/03/18 12:48 PM  
Result Value Ref Range Ventricular Rate 91 BPM  
 Atrial Rate 91 BPM  
 P-R Interval 164 ms QRS Duration 96 ms  
 Q-T Interval 360 ms QTC Calculation (Bezet) 442 ms Calculated P Axis 69 degrees Calculated R Axis -48 degrees Calculated T Axis 54 degrees Diagnosis Normal sinus rhythm Left axis deviation Incomplete right bundle branch block Nonspecific ST abnormality Confirmed by Servando Dhillon ([id number]) on 10/3/2018 3:27:14 PM 
  
POC TROPONIN-I  
 Collection Time: 10/03/18  1:07 PM  
Result Value Ref Range Troponin-I (POC) <0.04 0.00 - 0.08 ng/mL CBC WITH AUTOMATED DIFF Collection Time: 10/03/18  1:16 PM  
Result Value Ref Range WBC 15.6 (H) 3.6 - 11.0 K/uL  
 RBC 3.28 (L) 3.80 - 5.20 M/uL  
 HGB 10.5 (L) 11.5 - 16.0 g/dL HCT 32.2 (L) 35.0 - 47.0 % MCV 98.2 80.0 - 99.0 FL  
 MCH 32.0 26.0 - 34.0 PG  
 MCHC 32.6 30.0 - 36.5 g/dL  
 RDW 13.2 11.5 - 14.5 % PLATELET 856 425 - 877 K/uL MPV 11.1 8.9 - 12.9 FL  
 NRBC 0.0 0  WBC ABSOLUTE NRBC 0.00 0.00 - 0.01 K/uL NEUTROPHILS 86 (H) 32 - 75 % LYMPHOCYTES 6 (L) 12 - 49 % MONOCYTES 6 5 - 13 % EOSINOPHILS 0 0 - 7 % BASOPHILS 0 0 - 1 % IMMATURE GRANULOCYTES 2 (H) 0.0 - 0.5 % ABS. NEUTROPHILS 13.4 (H) 1.8 - 8.0 K/UL  
 ABS. LYMPHOCYTES 0.9 0.8 - 3.5 K/UL  
 ABS. MONOCYTES 1.0 0.0 - 1.0 K/UL  
 ABS. EOSINOPHILS 0.0 0.0 - 0.4 K/UL  
 ABS. BASOPHILS 0.0 0.0 - 0.1 K/UL  
 ABS. IMM. GRANS. 0.2 (H) 0.00 - 0.04 K/UL  
 DF AUTOMATED METABOLIC PANEL, COMPREHENSIVE Collection Time: 10/03/18  1:16 PM  
Result Value Ref Range Sodium 126 (L) 136 - 145 mmol/L Potassium 6.1 (H) 3.5 - 5.1 mmol/L Chloride 92 (L) 97 - 108 mmol/L  
 CO2 9 (LL) 21 - 32 mmol/L Anion gap 25 (H) 5 - 15 mmol/L Glucose 971 (HH) 65 - 100 mg/dL BUN 54 (H) 6 - 20 MG/DL Creatinine 1.89 (H) 0.55 - 1.02 MG/DL  
 BUN/Creatinine ratio 29 (H) 12 - 20 GFR est AA 31 (L) >60 ml/min/1.73m2 GFR est non-AA 26 (L) >60 ml/min/1.73m2 Calcium 8.8 8.5 - 10.1 MG/DL Bilirubin, total 0.5 0.2 - 1.0 MG/DL  
 ALT (SGPT) 31 12 - 78 U/L  
 AST (SGOT) 30 15 - 37 U/L Alk. phosphatase 144 (H) 45 - 117 U/L Protein, total 6.5 6.4 - 8.2 g/dL Albumin 3.5 3.5 - 5.0 g/dL Globulin 3.0 2.0 - 4.0 g/dL A-G Ratio 1.2 1.1 - 2.2    
TROPONIN I Collection Time: 10/03/18  1:16 PM  
Result Value Ref Range Troponin-I, Qt. 0.05 (H) <0.05 ng/mL CK W/ REFLX CKMB Collection Time: 10/03/18  1:16 PM  
Result Value Ref Range  26 - 192 U/L  
LACTIC ACID Collection Time: 10/03/18  1:16 PM  
Result Value Ref Range Lactic acid 5.6 (HH) 0.4 - 2.0 MMOL/L  
CULTURE, BLOOD, PAIRED Collection Time: 10/03/18  1:16 PM  
Result Value Ref Range Special Requests: NO SPECIAL REQUESTS Culture result: NO GROWTH AFTER 17 HOURS    
URINALYSIS W/ REFLEX CULTURE Collection Time: 10/03/18  1:16 PM  
Result Value Ref Range Color YELLOW/STRAW Appearance CLEAR CLEAR Specific gravity 1.027 1.003 - 1.030    
 pH (UA) 5.0 5.0 - 8.0 Protein NEGATIVE  NEG mg/dL Glucose >1000 (A) NEG mg/dL Ketone 40 (A) NEG mg/dL Bilirubin NEGATIVE  NEG Blood NEGATIVE  NEG Urobilinogen 0.2 0.2 - 1.0 EU/dL Nitrites NEGATIVE  NEG Leukocyte Esterase NEGATIVE  NEG    
 WBC 0-4 0 - 4 /hpf  
 RBC 0-5 0 - 5 /hpf Epithelial cells FEW FEW /lpf Bacteria NEGATIVE  NEG /hpf  
 UA:UC IF INDICATED CULTURE NOT INDICATED BY UA RESULT CNI Amorphous Crystals 1+ (A) NEG Hyaline cast 0-2 0 - 5 /lpf  
LIPASE Collection Time: 10/03/18  1:16 PM  
Result Value Ref Range Lipase 35 (L) 73 - 393 U/L MAGNESIUM Collection Time: 10/03/18  1:16 PM  
Result Value Ref Range Magnesium 2.6 (H) 1.6 - 2.4 mg/dL HEMOGLOBIN A1C WITH EAG Collection Time: 10/03/18  1:16 PM  
Result Value Ref Range Hemoglobin A1c 10.1 (H) 4.2 - 6.3 % Est. average glucose 243 mg/dL METABOLIC PANEL, BASIC Collection Time: 10/03/18  2:07 PM  
Result Value Ref Range Sodium 128 (L) 136 - 145 mmol/L Potassium 5.7 (H) 3.5 - 5.1 mmol/L Chloride 94 (L) 97 - 108 mmol/L  
 CO2 7 (LL) 21 - 32 mmol/L Anion gap 27 (H) 5 - 15 mmol/L Glucose 971 (HH) 65 - 100 mg/dL BUN 55 (H) 6 - 20 MG/DL Creatinine 1.92 (H) 0.55 - 1.02 MG/DL  
 BUN/Creatinine ratio 29 (H) 12 - 20 GFR est AA 31 (L) >60 ml/min/1.73m2 GFR est non-AA 25 (L) >60 ml/min/1.73m2 Calcium 8.6 8.5 - 10.1 MG/DL  
PHOSPHORUS Collection Time: 10/03/18  2:07 PM  
Result Value Ref Range Phosphorus 9.0 (H) 2.6 - 4.7 MG/DL PROTHROMBIN TIME + INR Collection Time: 10/03/18  2:08 PM  
Result Value Ref Range INR 1.1 0.9 - 1.1 Prothrombin time 11.4 (H) 9.0 - 11.1 sec PTT Collection Time: 10/03/18  2:08 PM  
Result Value Ref Range aPTT 22.2 22.1 - 32.0 sec  
 aPTT, therapeutic range     58.0 - 77.0 SECS  
GLUCOSE, POC Collection Time: 10/03/18  2:46 PM  
Result Value Ref Range Glucose (POC) >600 (HH) 65 - 100 mg/dL Performed by Jovany Topete BLOOD GAS, ARTERIAL Collection Time: 10/03/18  3:48 PM  
Result Value Ref Range pH 7.13 (LL) 7.35 - 7.45    
 PCO2 18 (L) 35.0 - 45.0 mmHg PO2 114 (H) 80 - 100 mmHg O2 SAT 97 92 - 97 % BICARBONATE 6 (L) 22 - 26 mmol/L  
 BASE DEFICIT 21.4 mmol/L  
 O2 METHOD ROOM AIR    
 SPONTANEOUS RATE 29.0 Sample source ARTERIAL    
 SITE RIGHT RADIAL SHERIF'S TEST YES Critical value read back Kranthi Domingo MD   
GLUCOSE, POC Collection Time: 10/03/18  3:58 PM  
Result Value Ref Range Glucose (POC) >600 (HH) 65 - 100 mg/dL Performed by Danii Chawla PROTHROMBIN TIME + INR Collection Time: 10/03/18  4:06 PM  
Result Value Ref Range INR 1.1 0.9 - 1.1 Prothrombin time 11.5 (H) 9.0 - 11.1 sec PTT Collection Time: 10/03/18  4:06 PM  
Result Value Ref Range aPTT 25.4 22.1 - 32.0 sec  
 aPTT, therapeutic range     58.0 - 77.0 SECS  
LACTIC ACID Collection Time: 10/03/18  4:06 PM  
Result Value Ref Range Lactic acid 5.4 (HH) 0.4 - 2.0 MMOL/L  
METABOLIC PANEL, COMPREHENSIVE Collection Time: 10/03/18  4:06 PM  
Result Value Ref Range Sodium 133 (L) 136 - 145 mmol/L Potassium 5.0 3.5 - 5.1 mmol/L Chloride 100 97 - 108 mmol/L  
 CO2 5 (LL) 21 - 32 mmol/L Anion gap 28 (H) 5 - 15 mmol/L Glucose 837 (HH) 65 - 100 mg/dL  BUN 56 (H) 6 - 20 MG/DL  
 Creatinine 1.94 (H) 0.55 - 1.02 MG/DL  
 BUN/Creatinine ratio 29 (H) 12 - 20 GFR est AA 30 (L) >60 ml/min/1.73m2 GFR est non-AA 25 (L) >60 ml/min/1.73m2 Calcium 8.4 (L) 8.5 - 10.1 MG/DL Bilirubin, total 0.5 0.2 - 1.0 MG/DL  
 ALT (SGPT) 35 12 - 78 U/L  
 AST (SGOT) 32 15 - 37 U/L Alk. phosphatase 136 (H) 45 - 117 U/L Protein, total 6.2 (L) 6.4 - 8.2 g/dL Albumin 3.3 (L) 3.5 - 5.0 g/dL Globulin 2.9 2.0 - 4.0 g/dL A-G Ratio 1.1 1.1 - 2.2 GLUCOSE, POC Collection Time: 10/03/18  6:14 PM  
Result Value Ref Range Glucose (POC) 533 (H) 65 - 100 mg/dL Performed by [facility]) Mateo Barnes GLUCOSE, POC Collection Time: 10/03/18  7:30 PM  
Result Value Ref Range Glucose (POC) 473 (H) 65 - 100 mg/dL [facility] LACTIC ACID Collection Time: 10/03/18  7:39 PM  
Result Value Ref Range Lactic acid 4.0 (HH) 0.4 - 2.0 MMOL/L  
GLUCOSE, POC Collection Time: 10/03/18  8:33 PM  
Result Value Ref Range Glucose (POC) 403 (H) 65 - 100 mg/dL Performed by Kandy Ha METABOLIC PANEL, BASIC Collection Time: 10/03/18  8:35 PM  
Result Value Ref Range Sodium 141 136 - 145 mmol/L Potassium 3.3 (L) 3.5 - 5.1 mmol/L Chloride 109 (H) 97 - 108 mmol/L  
 CO2 17 (L) 21 - 32 mmol/L Anion gap 15 5 - 15 mmol/L Glucose 413 (H) 65 - 100 mg/dL BUN 50 (H) 6 - 20 MG/DL Creatinine 1.70 (H) 0.55 - 1.02 MG/DL  
 BUN/Creatinine ratio 29 (H) 12 - 20 GFR est AA 35 (L) >60 ml/min/1.73m2 GFR est non-AA 29 (L) >60 ml/min/1.73m2 Calcium 8.5 8.5 - 10.1 MG/DL  
SAMPLES BEING HELD Collection Time: 10/03/18  9:10 PM  
Result Value Ref Range SAMPLES BEING HELD  1 LAV   
 COMMENT Add-on orders for these samples will be processed based on acceptable specimen integrity and analyte stability, which may vary by analyte. GLUCOSE, POC Collection Time: 10/03/18  9:35 PM  
Result Value Ref Range Glucose (POC) 376 (H) 65 - 100 mg/dL Performed by [facility] GLUCOSE, POC Collection Time: 10/03/18 10:39 PM  
Result Value Ref Range Glucose (POC) 267 (H) 65 - 100 mg/dL Performed by [facility] GLUCOSE, POC Collection Time: 10/03/18 11:35 PM  
Result Value Ref Range Glucose (POC) 186 (H) 65 - 100 mg/dL [facility] GLUCOSE, POC Collection Time: 10/04/18 12:39 AM  
Result Value Ref Range Glucose (POC) 101 (H) 65 - 100 mg/dL Performed by [facility] GLUCOSE, POC Collection Time: 10/04/18  1:39 AM  
Result Value Ref Range Glucose (POC) 77 65 - 100 mg/dL Performed by [facility] GLUCOSE, POC Collection Time: 10/04/18  1:59 AM  
Result Value Ref Range Glucose (POC) 80 65 - 100 mg/dL Performed by [employer]  METABOLIC PANEL, BASIC Collection Time: 10/04/18  2:12 AM  
Result Value Ref Range Sodium 143 136 - 145 mmol/L Potassium 3.9 3.5 - 5.1 mmol/L Chloride 113 (H) 97 - 108 mmol/L  
 CO2 22 21 - 32 mmol/L Anion gap 8 5 - 15 mmol/L Glucose 152 (H) 65 - 100 mg/dL BUN 41 (H) 6 - 20 MG/DL Creatinine 1.20 (H) 0.55 - 1.02 MG/DL  
 BUN/Creatinine ratio 34 (H) 12 - 20 GFR est AA 53 (L) >60 ml/min/1.73m2 GFR est non-AA 44 (L) >60 ml/min/1.73m2 Calcium 8.3 (L) 8.5 - 10.1 MG/DL  
GLUCOSE, POC Collection Time: 10/04/18  2:15 AM  
Result Value Ref Range Glucose (POC) 63 (L) 65 - 100 mg/dL Performed by [facility] GLUCOSE, POC Collection Time: 10/04/18  2:16 AM  
Result Value Ref Range Glucose (POC) 97 65 - 100 mg/dL Performed by [facility] GLUCOSE, POC Collection Time: 10/04/18  3:12 AM  
Result Value Ref Range Glucose (POC) 87 65 - 100 mg/dL Performed by [facility] GLUCOSE, POC Collection Time: 10/04/18  4:11 AM  
Result Value Ref Range Glucose (POC) 101 (H) 65 - 100 mg/dL Performed by Ángel Krause GLUCOSE, POC Collection Time: 10/04/18  5:21 AM  
Result Value Ref Range Glucose (POC) 127 (H) 65 - 100 mg/dL Performed by Sameer Chinchilla BLOOD GAS, ARTERIAL Collection Time: 10/04/18  5:50 AM  
Result Value Ref Range pH 7.44 7.35 - 7.45    
 PCO2 30 (L) 35.0 - 45.0 mmHg PO2 69 (L) 80 - 100 mmHg O2 SAT 95 92 - 97 % BICARBONATE 20 (L) 22 - 26 mmol/L  
 BASE DEFICIT 3.3 mmol/L  
 O2 METHOD ROOM AIR Sample source ARTERIAL    
 SITE RIGHT BRACHIAL SHERIF'S TEST N/A    
GLUCOSE, POC Collection Time: 10/04/18  6:18 AM  
Result Value Ref Range Glucose (POC) 169 (H) 65 - 100 mg/dL Performed by Sameer Chinchilla CBC WITH AUTOMATED DIFF Collection Time: 10/04/18  6:24 AM  
Result Value Ref Range WBC 14.4 (H) 3.6 - 11.0 K/uL  
 RBC 3.06 (L) 3.80 - 5.20 M/uL HGB 9.7 (L) 11.5 - 16.0 g/dL HCT 28.1 (L) 35.0 - 47.0 % MCV 91.8 80.0 - 99.0 FL  
 MCH 31.7 26.0 - 34.0 PG  
 MCHC 34.5 30.0 - 36.5 g/dL  
 RDW 13.2 11.5 - 14.5 % PLATELET 587 402 - 050 K/uL MPV 10.1 8.9 - 12.9 FL  
 NRBC 0.0 0  WBC ABSOLUTE NRBC 0.00 0.00 - 0.01 K/uL NEUTROPHILS 81 (H) 32 - 75 % LYMPHOCYTES 7 (L) 12 - 49 % MONOCYTES 12 5 - 13 % EOSINOPHILS 0 0 - 7 % BASOPHILS 0 0 - 1 % IMMATURE GRANULOCYTES 0 0.0 - 0.5 % ABS. NEUTROPHILS 11.6 (H) 1.8 - 8.0 K/UL  
 ABS. LYMPHOCYTES 1.0 0.8 - 3.5 K/UL  
 ABS. MONOCYTES 1.7 (H) 0.0 - 1.0 K/UL  
 ABS. EOSINOPHILS 0.0 0.0 - 0.4 K/UL  
 ABS. BASOPHILS 0.0 0.0 - 0.1 K/UL  
 ABS. IMM. GRANS. 0.1 (H) 0.00 - 0.04 K/UL  
 DF AUTOMATED METABOLIC PANEL, COMPREHENSIVE Collection Time: 10/04/18  6:24 AM  
Result Value Ref Range Sodium 142 136 - 145 mmol/L Potassium 4.3 3.5 - 5.1 mmol/L Chloride 113 (H) 97 - 108 mmol/L  
 CO2 19 (L) 21 - 32 mmol/L Anion gap 10 5 - 15 mmol/L Glucose 208 (H) 65 - 100 mg/dL BUN 40 (H) 6 - 20 MG/DL Creatinine 1.19 (H) 0.55 - 1.02 MG/DL  
 BUN/Creatinine ratio 34 (H) 12 - 20 GFR est AA 53 (L) >60 ml/min/1.73m2 GFR est non-AA 44 (L) >60 ml/min/1.73m2  Calcium 8.2 (L) 8.5 - 10.1 MG/DL  
 Bilirubin, total 0.3 0.2 - 1.0 MG/DL  
 ALT (SGPT) 32 12 - 78 U/L  
 AST (SGOT) 41 (H) 15 - 37 U/L Alk. phosphatase 62 45 - 117 U/L Protein, total 5.7 (L) 6.4 - 8.2 g/dL Albumin 2.9 (L) 3.5 - 5.0 g/dL Globulin 2.8 2.0 - 4.0 g/dL A-G Ratio 1.0 (L) 1.1 - 2.2 MAGNESIUM Collection Time: 10/04/18  6:24 AM  
Result Value Ref Range Magnesium 2.3 1.6 - 2.4 mg/dL PHOSPHORUS Collection Time: 10/04/18  6:24 AM  
Result Value Ref Range Phosphorus 3.7 2.6 - 4.7 MG/DL  
GLUCOSE, POC Collection Time: 10/04/18  7:22 AM  
Result Value Ref Range Glucose (POC) 252 (H) 65 - 100 mg/dL Performed by Ayse Marin Assessment:  
 
Active Problems: 
  [facility]) Plan: 1. Pt admitted for ketoacidosis. She remains on an insulin drip. Would like CO2 >20 before d/cing drip. No obvious ppt factor assuming pt did not stop insulin. 2.She is currently confused representing a definite change from her baseline. No other focality. 3.No evidence of fluid overload. Continue vigilance.

## 2018-10-05 LAB
ALBUMIN SERPL-MCNC: 2.8 G/DL (ref 3.5–5)
ALBUMIN/GLOB SERPL: 1 {RATIO} (ref 1.1–2.2)
ALP SERPL-CCNC: 63 U/L (ref 45–117)
ALT SERPL-CCNC: 31 U/L (ref 12–78)
ANION GAP SERPL CALC-SCNC: 10 MMOL/L (ref 5–15)
AST SERPL-CCNC: 33 U/L (ref 15–37)
BASOPHILS # BLD: 0.1 K/UL (ref 0–0.1)
BASOPHILS NFR BLD: 1 % (ref 0–1)
BILIRUB SERPL-MCNC: 0.4 MG/DL (ref 0.2–1)
BLASTS NFR BLD MANUAL: 0 %
BNP SERPL-MCNC: 2099 PG/ML (ref 0–450)
BUN SERPL-MCNC: 29 MG/DL (ref 6–20)
BUN/CREAT SERPL: 42 (ref 12–20)
CALCIUM SERPL-MCNC: 8 MG/DL (ref 8.5–10.1)
CHLORIDE SERPL-SCNC: 110 MMOL/L (ref 97–108)
CO2 SERPL-SCNC: 20 MMOL/L (ref 21–32)
CREAT SERPL-MCNC: 0.69 MG/DL (ref 0.55–1.02)
DIFFERENTIAL METHOD BLD: ABNORMAL
EOSINOPHIL # BLD: 0.4 K/UL (ref 0–0.4)
EOSINOPHIL NFR BLD: 4 % (ref 0–7)
ERYTHROCYTE [DISTWIDTH] IN BLOOD BY AUTOMATED COUNT: 13.6 % (ref 11.5–14.5)
GLOBULIN SER CALC-MCNC: 2.7 G/DL (ref 2–4)
GLUCOSE BLD STRIP.AUTO-MCNC: 175 MG/DL (ref 65–100)
GLUCOSE BLD STRIP.AUTO-MCNC: 254 MG/DL (ref 65–100)
GLUCOSE BLD STRIP.AUTO-MCNC: 309 MG/DL (ref 65–100)
GLUCOSE BLD STRIP.AUTO-MCNC: 326 MG/DL (ref 65–100)
GLUCOSE SERPL-MCNC: 183 MG/DL (ref 65–100)
HCT VFR BLD AUTO: 28.3 % (ref 35–47)
HGB BLD-MCNC: 9.5 G/DL (ref 11.5–16)
IMM GRANULOCYTES # BLD: 0 K/UL
IMM GRANULOCYTES NFR BLD AUTO: 0 %
LYMPHOCYTES # BLD: 2.1 K/UL (ref 0.8–3.5)
LYMPHOCYTES NFR BLD: 21 % (ref 12–49)
MCH RBC QN AUTO: 31.3 PG (ref 26–34)
MCHC RBC AUTO-ENTMCNC: 33.6 G/DL (ref 30–36.5)
MCV RBC AUTO: 93.1 FL (ref 80–99)
METAMYELOCYTES NFR BLD MANUAL: 0 %
MONOCYTES # BLD: 1.3 K/UL (ref 0–1)
MONOCYTES NFR BLD: 13 % (ref 5–13)
MYELOCYTES NFR BLD MANUAL: 0 %
NEUTS BAND NFR BLD MANUAL: 0 % (ref 0–6)
NEUTS SEG # BLD: 6.3 K/UL (ref 1.8–8)
NEUTS SEG NFR BLD: 61 % (ref 32–75)
NRBC # BLD: 0 K/UL (ref 0–0.01)
NRBC BLD-RTO: 0 PER 100 WBC
OTHER CELLS NFR BLD MANUAL: 0 %
PLATELET # BLD AUTO: 214 K/UL (ref 150–400)
PMV BLD AUTO: 10.2 FL (ref 8.9–12.9)
POTASSIUM SERPL-SCNC: 3.9 MMOL/L (ref 3.5–5.1)
PROMYELOCYTES NFR BLD MANUAL: 0 %
PROT SERPL-MCNC: 5.5 G/DL (ref 6.4–8.2)
RBC # BLD AUTO: 3.04 M/UL (ref 3.8–5.2)
RBC MORPH BLD: ABNORMAL
SERVICE CMNT-IMP: ABNORMAL
SODIUM SERPL-SCNC: 140 MMOL/L (ref 136–145)
WBC # BLD AUTO: 10.2 K/UL (ref 3.6–11)

## 2018-10-05 PROCEDURE — 83880 ASSAY OF NATRIURETIC PEPTIDE: CPT | Performed by: INTERNAL MEDICINE

## 2018-10-05 PROCEDURE — 36415 COLL VENOUS BLD VENIPUNCTURE: CPT | Performed by: INTERNAL MEDICINE

## 2018-10-05 PROCEDURE — 74011636637 HC RX REV CODE- 636/637: Performed by: INTERNAL MEDICINE

## 2018-10-05 PROCEDURE — 85027 COMPLETE CBC AUTOMATED: CPT | Performed by: INTERNAL MEDICINE

## 2018-10-05 PROCEDURE — 74011250637 HC RX REV CODE- 250/637: Performed by: INTERNAL MEDICINE

## 2018-10-05 PROCEDURE — 94760 N-INVAS EAR/PLS OXIMETRY 1: CPT

## 2018-10-05 PROCEDURE — 80053 COMPREHEN METABOLIC PANEL: CPT | Performed by: INTERNAL MEDICINE

## 2018-10-05 PROCEDURE — 74011250636 HC RX REV CODE- 250/636: Performed by: GENERAL ACUTE CARE HOSPITAL

## 2018-10-05 PROCEDURE — 82962 GLUCOSE BLOOD TEST: CPT

## 2018-10-05 PROCEDURE — 74011000258 HC RX REV CODE- 258: Performed by: INTERNAL MEDICINE

## 2018-10-05 PROCEDURE — 65270000015 HC RM PRIVATE ONCOLOGY

## 2018-10-05 RX ORDER — INSULIN GLARGINE 100 [IU]/ML
30 INJECTION, SOLUTION SUBCUTANEOUS DAILY
Status: DISCONTINUED | OUTPATIENT
Start: 2018-10-06 | End: 2018-10-06

## 2018-10-05 RX ADMIN — HYDROCHLOROTHIAZIDE 25 MG: 25 TABLET ORAL at 08:15

## 2018-10-05 RX ADMIN — Medication 10 ML: at 22:10

## 2018-10-05 RX ADMIN — Medication 10 ML: at 13:16

## 2018-10-05 RX ADMIN — SODIUM CHLORIDE 75 ML/HR: 450 INJECTION, SOLUTION INTRAVENOUS at 18:25

## 2018-10-05 RX ADMIN — Medication 5 ML: at 06:49

## 2018-10-05 RX ADMIN — LOSARTAN POTASSIUM 100 MG: 100 TABLET, FILM COATED ORAL at 08:15

## 2018-10-05 RX ADMIN — AMLODIPINE BESYLATE 10 MG: 5 TABLET ORAL at 08:15

## 2018-10-05 RX ADMIN — INSULIN GLARGINE 24 UNITS: 100 INJECTION, SOLUTION SUBCUTANEOUS at 08:16

## 2018-10-05 RX ADMIN — ENOXAPARIN SODIUM 30 MG: 30 INJECTION, SOLUTION INTRAVENOUS; SUBCUTANEOUS at 18:25

## 2018-10-05 NOTE — DIABETES MGMT
DTC Consult Note Recommendations/ Comments: Please consider adding humalog 4 units ac tid to aid in controlling day time BG rises with po intake. Current hospital DM medication: lantus 30 units daily beginning tomorrow DTC will continue to follow patient as needed. ____________________________ Consult received for:   []           Hospital Medication Recommendations [x]           Hospital Blood Glucose Management Chart reviewed and initial evaluation complete on Deana Chase. Patient is a 69 y.o. female with known Type 2 Diabetes on levemir 30 units daily at home. A1c:  
Lab Results Component Value Date/Time Hemoglobin A1c 10.1 (H) 10/03/2018 01:16 PM  
 
 
Recent Glucose Results:  
Lab Results Component Value Date/Time  (H) 10/05/2018 04:44 AM  
  (H) 10/04/2018 06:10 PM  
 GLUCPOC 254 (H) 10/05/2018 11:01 AM  
 GLUCPOC 175 (H) 10/05/2018 08:10 AM  
 GLUCPOC 314 (H) 10/04/2018 09:50 PM  
  
 
Lab Results Component Value Date/Time Creatinine 0.69 10/05/2018 04:44 AM  
 
 
Active Orders Diet DIET DIABETIC CONSISTENT CARB Regular PO intake:  
Patient Vitals for the past 72 hrs: 
 % Diet Eaten 10/04/18 1021 25 % Thank you. Chanda Stanley, BSN, RN, CDE [facility]

## 2018-10-05 NOTE — PROGRESS NOTES
General Daily Progress Note Admit Date: 10/3/2018 Subjective:  
 
Patient has no complaint--more alert. Current Facility-Administered Medications Medication Dose Route Frequency  insulin glargine (LANTUS) injection 30 Units  30 Units SubCUTAneous DAILY  0.45% sodium chloride infusion  75 mL/hr IntraVENous CONTINUOUS  
 amLODIPine (NORVASC) tablet 10 mg  10 mg Oral DAILY  losartan (COZAAR) tablet 100 mg  100 mg Oral DAILY  sodium chloride (NS) flush 5-10 mL  5-10 mL IntraVENous Q8H  
 sodium chloride (NS) flush 5-10 mL  5-10 mL IntraVENous PRN  
 glucose chewable tablet 16 g  4 Tab Oral PRN  
 dextrose (D50W) injection syrg 12.5-25 g  25-50 mL IntraVENous PRN  
 glucagon (GLUCAGEN) injection 1 mg  1 mg IntraMUSCular PRN  
 sodium chloride (NS) flush 5-10 mL  5-10 mL IntraVENous Q8H  
 sodium chloride (NS) flush 5-10 mL  5-10 mL IntraVENous PRN  
 enoxaparin (LOVENOX) injection 30 mg  30 mg SubCUTAneous Q24H Review of Systems A comprehensive review of systems was negative. Objective:  
 
Patient Vitals for the past 24 hrs: 
 BP Temp Pulse Resp SpO2  
10/05/18 0804 133/47 98.1 °F (36.7 °C) 77 20 98 % 10/05/18 0010 138/57 98.6 °F (37 °C) - 20 99 % 10/04/18 1952 128/51 98.1 °F (36.7 °C) 89 18 100 % 10/04/18 1530 133/41 98.2 °F (36.8 °C) 89 18 97 % 10/04/18 1245 130/50 98.3 °F (36.8 °C) 84 18 100 % 10/04/18 1200 163/79 - 89 23 98 % 10/04/18 1100 180/54 - 93 11 -  
10/04/18 1058 180/54 98.2 °F (36.8 °C) 90 25 100 % 10/04/18 1000 160/49 - 96 26 100 % 10/05 0701 - 10/05 1900 In: 911.7 [I.V.:911.7] Out: -  
10/03 1901 - 10/05 0700 In: 1914 [P.O.:240; I.V.:3549] Out: 1200 [Urine:1200] Physical Exam:  
Visit Vitals  /47 (BP 1 Location: Right arm, BP Patient Position: Sitting)  Pulse 77  Temp 98.1 °F (36.7 °C)  Resp 20  Wt 170 lb (77.1 kg) Comment: Weight 3 weeks ago  SpO2 98%  BMI 30.11 kg/m2 General appearance: alert, cooperative, no distress, appears stated age Neck: supple, symmetrical, trachea midline, no adenopathy, thyroid: not enlarged, symmetric, no tenderness/mass/nodules, no carotid bruit and no JVD Lungs: clear to auscultation bilaterally Heart: regular rate and rhythm, S1, S2 normal, no murmur, click, rub or gallop Abdomen: soft, non-tender. Bowel sounds normal. No masses,  no organomegaly Extremities: extremities normal, atraumatic, no cyanosis or edema Data Review Recent Results (from the past 24 hour(s)) GLUCOSE, POC Collection Time: 10/04/18 10:19 AM  
Result Value Ref Range Glucose (POC) 237 (H) 65 - 100 mg/dL Performed by Claudia Banfito GLUCOSE, POC Collection Time: 10/04/18 11:26 AM  
Result Value Ref Range Glucose (POC) 254 (H) 65 - 100 mg/dL Performed by Claudia Wong EKG, 12 LEAD, INITIAL Collection Time: 10/04/18 11:44 AM  
Result Value Ref Range Ventricular Rate 90 BPM  
 Atrial Rate 90 BPM  
 P-R Interval 142 ms QRS Duration 86 ms  
 Q-T Interval 344 ms QTC Calculation (Bezet) 420 ms Calculated P Axis 32 degrees Calculated R Axis -10 degrees Calculated T Axis -68 degrees Diagnosis Normal sinus rhythm Baseline artifact Nonspecific ST abnormality Confirmed by Radha Baltazar ([id number]) on 10/4/2018 6:30:45 PM 
  
GLUCOSE, POC Collection Time: 10/04/18 12:26 PM  
Result Value Ref Range Glucose (POC) 176 (H) 65 - 100 mg/dL Performed by Claudia Banfito GLUCOSE, POC Collection Time: 10/04/18  4:24 PM  
Result Value Ref Range Glucose (POC) 296 (H) 65 - 100 mg/dL Performed by Winfield Sandhoff \"Johana\" GLUCOSE, POC Collection Time: 10/04/18  5:10 PM  
Result Value Ref Range Glucose (POC) 257 (H) 65 - 100 mg/dL Performed by Dora Duggan METABOLIC PANEL, BASIC Collection Time: 10/04/18  6:10 PM  
Result Value Ref Range  Sodium 139 136 - 145 mmol/L  
 Potassium 4.6 3.5 - 5.1 mmol/L Chloride 109 (H) 97 - 108 mmol/L  
 CO2 22 21 - 32 mmol/L Anion gap 8 5 - 15 mmol/L Glucose 278 (H) 65 - 100 mg/dL BUN 40 (H) 6 - 20 MG/DL Creatinine 1.16 (H) 0.55 - 1.02 MG/DL  
 BUN/Creatinine ratio 34 (H) 12 - 20 GFR est AA 55 (L) >60 ml/min/1.73m2 GFR est non-AA 45 (L) >60 ml/min/1.73m2 Calcium 8.6 8.5 - 10.1 MG/DL  
GLUCOSE, POC Collection Time: 10/04/18  8:41 PM  
Result Value Ref Range Glucose (POC) 374 (H) 65 - 100 mg/dL Performed by Kayden Sherwood GLUCOSE, POC Collection Time: 10/04/18  9:50 PM  
Result Value Ref Range Glucose (POC) 314 (H) 65 - 100 mg/dL Performed by Kaycee Rush METABOLIC PANEL, COMPREHENSIVE Collection Time: 10/05/18  4:44 AM  
Result Value Ref Range Sodium 140 136 - 145 mmol/L Potassium 3.9 3.5 - 5.1 mmol/L Chloride 110 (H) 97 - 108 mmol/L  
 CO2 20 (L) 21 - 32 mmol/L Anion gap 10 5 - 15 mmol/L Glucose 183 (H) 65 - 100 mg/dL BUN 29 (H) 6 - 20 MG/DL Creatinine 0.69 0.55 - 1.02 MG/DL  
 BUN/Creatinine ratio 42 (H) 12 - 20 GFR est AA >60 >60 ml/min/1.73m2 GFR est non-AA >60 >60 ml/min/1.73m2 Calcium 8.0 (L) 8.5 - 10.1 MG/DL Bilirubin, total 0.4 0.2 - 1.0 MG/DL  
 ALT (SGPT) 31 12 - 78 U/L  
 AST (SGOT) 33 15 - 37 U/L Alk. phosphatase 63 45 - 117 U/L Protein, total 5.5 (L) 6.4 - 8.2 g/dL Albumin 2.8 (L) 3.5 - 5.0 g/dL Globulin 2.7 2.0 - 4.0 g/dL A-G Ratio 1.0 (L) 1.1 - 2.2    
CBC WITH MANUAL DIFF Collection Time: 10/05/18  4:44 AM  
Result Value Ref Range WBC 10.2 3.6 - 11.0 K/uL  
 RBC 3.04 (L) 3.80 - 5.20 M/uL HGB 9.5 (L) 11.5 - 16.0 g/dL HCT 28.3 (L) 35.0 - 47.0 % MCV 93.1 80.0 - 99.0 FL  
 MCH 31.3 26.0 - 34.0 PG  
 MCHC 33.6 30.0 - 36.5 g/dL  
 RDW 13.6 11.5 - 14.5 % PLATELET 556 670 - 082 K/uL MPV 10.2 8.9 - 12.9 FL  
 NRBC 0.0 0  WBC ABSOLUTE NRBC 0.00 0.00 - 0.01 K/uL NEUTROPHILS 61 32 - 75 % BAND NEUTROPHILS 0 0 - 6 % LYMPHOCYTES 21 12 - 49 % MONOCYTES 13 5 - 13 % EOSINOPHILS 4 0 - 7 % BASOPHILS 1 0 - 1 % METAMYELOCYTES 0 0 % MYELOCYTES 0 0 % PROMYELOCYTES 0 0 % BLASTS 0 0 % OTHER CELL 0 0 IMMATURE GRANULOCYTES 0 %  
 ABS. NEUTROPHILS 6.3 1.8 - 8.0 K/UL  
 ABS. LYMPHOCYTES 2.1 0.8 - 3.5 K/UL  
 ABS. MONOCYTES 1.3 (H) 0.0 - 1.0 K/UL  
 ABS. EOSINOPHILS 0.4 0.0 - 0.4 K/UL  
 ABS. BASOPHILS 0.1 0.0 - 0.1 K/UL  
 ABS. IMM. GRANS. 0.0 K/UL  
 DF MANUAL    
 RBC COMMENTS NORMOCYTIC, NORMOCHROMIC    
NT-PRO BNP Collection Time: 10/05/18  4:44 AM  
Result Value Ref Range NT pro-BNP 2099 (H) 0 - 450 PG/ML  
GLUCOSE, POC Collection Time: 10/05/18  8:10 AM  
Result Value Ref Range Glucose (POC) 175 (H) 65 - 100 mg/dL Performed by Debi Estrada Assessment:  
 
Active Problems: 
  [facility]oses[facility]) Plan: 1. Continue diabetic control. Correctional scale discontinued. We will continue to adjust basal insulin. 2.  Patient could not remember the details surrounding her premorbid condition. The single biggest problem as has been identified before is a significant reduction in her short-term memory. 3.  Continue hydration.

## 2018-10-05 NOTE — PROGRESS NOTES
Oncology Nursing Communication Tool 
3:06 PM 
10/5/2018 Bedside shift change report given to [facility], RN (incoming nurse) by Jose Gregory RN (outgoing nurse) on [employer]. Report included the following information SBAR. Shift Summary: blood sugar Issues for physician to address: Oncology Shift Note Admission Date 10/3/2018 Admission Diagnosis DKA (diabetic ketoacidoses) ([facility] [address] Code Status Full Code Consults None Cardiac Monitoring [] Yes [] No  
  
Purposeful Hourly Rounding [x] Yes   
Tyler Score Total Score: 5 Tyler score 3 or > [] Bed Alarm [] Avasys [] 1:1 sitter [] Patient refused (Place signed refusal form in chart) Pain Managed [x] Yes [] No  
 Key Pain Meds   
    
  
 aspirin/salicylamide/caffeine (BC HEADACHE POWDER PO) Take 1 Packet by mouth three (3) times daily as needed (back pain). Influenza Vaccine Received Flu Vaccine for Current Season (usually Sept-March): No  
 Patient/Guardian Refused (Notify MD): Yes Oxygen needs? [] Room air Oxygen @  []1L    []2L    []3L   []4L    []5L   []6L Use home O2? [] Yes [] No 
Perform O2 challenge test using  smartphrase (.oxygenchallenge) Last bowel movement Last Bowel Movement Date: 10/04/18 
bowel movement Urinary Catheter External Female Catheter 10/03/18-Urine Output (mL): 0 ml LDAs Peripheral IV 10/03/18 Left Hand (Active) Site Assessment Clean, dry, & intact 10/5/2018  7:48 AM  
Phlebitis Assessment 0 10/5/2018  7:48 AM  
Infiltration Assessment 0 10/5/2018  7:48 AM  
Dressing Status Clean, dry, & intact 10/5/2018  7:48 AM  
Dressing Type Tape;Transparent 10/5/2018  7:48 AM  
Hub Color/Line Status Pink; Infusing 10/5/2018  1:53 AM  
   
Peripheral IV 10/03/18 Right Wrist (Active) Site Assessment Clean, dry, & intact 10/5/2018  7:48 AM  
Phlebitis Assessment 0 10/5/2018  7:48 AM  
Infiltration Assessment 0 10/5/2018  7:48 AM  
 Dressing Status Clean, dry, & intact 10/5/2018  7:48 AM  
Dressing Type Tape;Transparent 10/5/2018  7:48 AM  
Hub Color/Line Status Pink 10/5/2018  7:48 AM  
   
Peripheral IV 10/03/18 Left Antecubital (Active) Site Assessment Clean, dry, & intact 10/4/2018  1:04 PM  
Phlebitis Assessment 0 10/4/2018  1:04 PM  
Infiltration Assessment 0 10/4/2018  1:04 PM  
Dressing Status Clean, dry, & intact 10/4/2018  1:04 PM  
Dressing Type Transparent;Tape 10/4/2018  1:04 PM  
Hub Color/Line Status Green;Capped 10/4/2018  1:04 PM  
      
External Female Catheter 10/03/18 (Active) Site Assessment Clean, dry, & intact 10/4/2018  7:00 AM  
Repositioned Yes 10/4/2018  7:00 AM  
Perineal Care Yes 10/4/2018  8:58 AM  
Wick Changed No 10/4/2018  4:22 AM  
Suction Canister/Tubing Changed No 10/4/2018  4:22 AM  
Urine Output (mL) 0 ml 10/3/2018 11:44 PM  
            
  
Readmission Risk Assessment Tool Score Medium Risk 12 Total Score 3 Has Seen PCP in Last 6 Months (Yes=3, No=0)  
 4 IP Visits Last 12 Months (1-3=4, 4=9, >4=11) 5 Pt. Coverage (Medicare=5 , Medicaid, or Self-Pay=4) 4 Charlson Comorbidity Score (Age + Comorbid Conditions) Criteria that do not apply:  
 . Living with Significant Other. Assisted Living. LTAC. SNF. or  
Rehab Patient Length of Stay (>5 days = 3) Expected Length of Stay 2d 21h Actual Length of Stay 2 Ha Zhao RN

## 2018-10-05 NOTE — PROGRESS NOTES
Bedside and Verbal shift change report given to [address] (oncoming nurse) by Hazel Peralta RN (offgoing nurse). Report included the following information SBAR, Kardex, Procedure Summary, Intake/Output, MAR and Recent Results.

## 2018-10-06 LAB
ANION GAP SERPL CALC-SCNC: 8 MMOL/L (ref 5–15)
BUN SERPL-MCNC: 13 MG/DL (ref 6–20)
BUN/CREAT SERPL: 23 (ref 12–20)
CALCIUM SERPL-MCNC: 8.2 MG/DL (ref 8.5–10.1)
CHLORIDE SERPL-SCNC: 104 MMOL/L (ref 97–108)
CO2 SERPL-SCNC: 25 MMOL/L (ref 21–32)
CREAT SERPL-MCNC: 0.57 MG/DL (ref 0.55–1.02)
GLUCOSE BLD STRIP.AUTO-MCNC: 196 MG/DL (ref 65–100)
GLUCOSE BLD STRIP.AUTO-MCNC: 211 MG/DL (ref 65–100)
GLUCOSE BLD STRIP.AUTO-MCNC: 240 MG/DL (ref 65–100)
GLUCOSE SERPL-MCNC: 205 MG/DL (ref 65–100)
POTASSIUM SERPL-SCNC: 3.8 MMOL/L (ref 3.5–5.1)
SERVICE CMNT-IMP: ABNORMAL
SODIUM SERPL-SCNC: 137 MMOL/L (ref 136–145)

## 2018-10-06 PROCEDURE — 82962 GLUCOSE BLOOD TEST: CPT

## 2018-10-06 PROCEDURE — 94760 N-INVAS EAR/PLS OXIMETRY 1: CPT

## 2018-10-06 PROCEDURE — 74011250636 HC RX REV CODE- 250/636: Performed by: INTERNAL MEDICINE

## 2018-10-06 PROCEDURE — 65270000015 HC RM PRIVATE ONCOLOGY

## 2018-10-06 PROCEDURE — 74011636637 HC RX REV CODE- 636/637: Performed by: INTERNAL MEDICINE

## 2018-10-06 PROCEDURE — 74011250637 HC RX REV CODE- 250/637: Performed by: INTERNAL MEDICINE

## 2018-10-06 PROCEDURE — 80048 BASIC METABOLIC PNL TOTAL CA: CPT | Performed by: INTERNAL MEDICINE

## 2018-10-06 PROCEDURE — 36415 COLL VENOUS BLD VENIPUNCTURE: CPT | Performed by: INTERNAL MEDICINE

## 2018-10-06 RX ORDER — INSULIN GLARGINE 100 [IU]/ML
36 INJECTION, SOLUTION SUBCUTANEOUS DAILY
Status: DISCONTINUED | OUTPATIENT
Start: 2018-10-07 | End: 2018-10-07

## 2018-10-06 RX ORDER — INSULIN GLARGINE 100 [IU]/ML
6 INJECTION, SOLUTION SUBCUTANEOUS
Status: COMPLETED | OUTPATIENT
Start: 2018-10-06 | End: 2018-10-06

## 2018-10-06 RX ORDER — ENOXAPARIN SODIUM 100 MG/ML
40 INJECTION SUBCUTANEOUS EVERY 24 HOURS
Status: DISCONTINUED | OUTPATIENT
Start: 2018-10-06 | End: 2018-10-09 | Stop reason: HOSPADM

## 2018-10-06 RX ADMIN — INSULIN GLARGINE 30 UNITS: 100 INJECTION, SOLUTION SUBCUTANEOUS at 08:54

## 2018-10-06 RX ADMIN — LOSARTAN POTASSIUM 100 MG: 100 TABLET, FILM COATED ORAL at 08:54

## 2018-10-06 RX ADMIN — Medication 10 ML: at 06:15

## 2018-10-06 RX ADMIN — AMLODIPINE BESYLATE 10 MG: 5 TABLET ORAL at 08:54

## 2018-10-06 RX ADMIN — ENOXAPARIN SODIUM 40 MG: 30 INJECTION, SOLUTION INTRAVENOUS; SUBCUTANEOUS at 17:02

## 2018-10-06 RX ADMIN — INSULIN GLARGINE 6 UNITS: 100 INJECTION, SOLUTION SUBCUTANEOUS at 15:15

## 2018-10-06 RX ADMIN — Medication 10 ML: at 13:55

## 2018-10-06 RX ADMIN — Medication 10 ML: at 22:20

## 2018-10-06 NOTE — PROGRESS NOTES
Problem: Falls - Risk of 
Goal: *Absence of Falls Document Milli Faustino Fall Risk and appropriate interventions in the flowsheet. Outcome: Progressing Towards Goal 
Fall Risk Interventions: 
Mobility Interventions: Patient to call before getting OOB Mentation Interventions: Adequate sleep, hydration, pain control, Door open when patient unattended, More frequent rounding, Increase mobility, Reorient patient, Toileting rounds, Room close to nurse's station Medication Interventions: Teach patient to arise slowly, Patient to call before getting OOB Elimination Interventions: Call light in reach, Patient to call for help with toileting needs, Toileting schedule/hourly rounds History of Falls Interventions: Door open when patient unattended, Room close to nurse's station Problem: Pressure Injury - Risk of 
Goal: *Prevention of pressure injury Document Madhu Scale and appropriate interventions in the flowsheet. Outcome: Progressing Towards Goal 
Pressure Injury Interventions: 
Sensory Interventions: Pressure redistribution bed/mattress (bed type) Moisture Interventions: Absorbent underpads, Check for incontinence Q2 hours and as needed Activity Interventions: Pressure redistribution bed/mattress(bed type), Increase time out of bed Mobility Interventions: HOB 30 degrees or less, Pressure redistribution bed/mattress (bed type), Turn and reposition approx. every two hours(pillow and wedges) Nutrition Interventions: Document food/fluid/supplement intake, Offer support with meals,snacks and hydration Friction and Shear Interventions: Lift sheet

## 2018-10-06 NOTE — PROGRESS NOTES
Spiritual Care Partner Volunteer visited patient in Oncology on October 6, 2018. Documented by: 
SAEED Corona, [address],  [facility]  Paging Service  [phone]

## 2018-10-06 NOTE — PROGRESS NOTES
General Daily Progress Note Admit Date: 10/3/2018 Subjective:  
 
Patient has no complaint. Current Facility-Administered Medications Medication Dose Route Frequency  [START ON 10/7/2018] insulin glargine (LANTUS) injection 36 Units  36 Units SubCUTAneous DAILY  insulin glargine (LANTUS) injection 6 Units  6 Units SubCUTAneous NOW  amLODIPine (NORVASC) tablet 10 mg  10 mg Oral DAILY  losartan (COZAAR) tablet 100 mg  100 mg Oral DAILY  glucose chewable tablet 16 g  4 Tab Oral PRN  
 dextrose (D50W) injection syrg 12.5-25 g  25-50 mL IntraVENous PRN  
 glucagon (GLUCAGEN) injection 1 mg  1 mg IntraMUSCular PRN  
 sodium chloride (NS) flush 5-10 mL  5-10 mL IntraVENous Q8H  
 sodium chloride (NS) flush 5-10 mL  5-10 mL IntraVENous PRN  
 enoxaparin (LOVENOX) injection 30 mg  30 mg SubCUTAneous Q24H Review of Systems A comprehensive review of systems was negative. Objective:  
 
Patient Vitals for the past 24 hrs: 
 BP Temp Pulse Resp SpO2 Weight  
10/06/18 0800 142/57 98.3 °F (36.8 °C) 71 18 95 % -  
10/06/18 0643 - - - - - 177 lb 7.5 oz (80.5 kg) 10/06/18 0300 154/58 97.9 °F (36.6 °C) 78 18 94 % -  
10/06/18 0052 140/70 - - - - -  
10/05/18 2345 162/81 98.1 °F (36.7 °C) 81 16 100 % -  
10/05/18 2006 144/54 98.1 °F (36.7 °C) 78 16 100 % -  
10/05/18 1632 136/58 98.4 °F (36.9 °C) 76 16 100 % -  
 
  
10/04 1901 - 10/06 0700 In: 3238.3 [I.V.:3238.3] Out: 500 [Urine:500] Physical Exam:  
Visit Vitals  /57 (BP 1 Location: Left arm, BP Patient Position: At rest)  Pulse 71  Temp 98.3 °F (36.8 °C)  Resp 18  Wt 177 lb 7.5 oz (80.5 kg)  SpO2 95%  BMI 31.44 kg/m2 General appearance: alert, cooperative, no distress, appears stated age Neck: supple, symmetrical, trachea midline, no adenopathy, thyroid: not enlarged, symmetric, no tenderness/mass/nodules, no carotid bruit and no JVD Lungs: clear to auscultation bilaterally Heart: regular rate and rhythm, S1, S2 normal, no murmur, click, rub or gallop Abdomen: soft, non-tender. Bowel sounds normal. No masses,  no organomegaly Extremities: extremities normal, atraumatic, no cyanosis or edema Data Review Recent Results (from the past 24 hour(s)) GLUCOSE, POC Collection Time: 10/05/18  4:38 PM  
Result Value Ref Range Glucose (POC) 309 (H) 65 - 100 mg/dL Performed by Dirk Talavera GLUCOSE, POC Collection Time: 10/05/18  8:59 PM  
Result Value Ref Range Glucose (POC) 326 (H) 65 - 100 mg/dL Performed by Oswaldo Osorio GLUCOSE, POC Collection Time: 10/06/18  7:47 AM  
Result Value Ref Range Glucose (POC) 196 (H) 65 - 100 mg/dL Performed by Anderson Fleischer METABOLIC PANEL, BASIC Collection Time: 10/06/18  7:59 AM  
Result Value Ref Range Sodium 137 136 - 145 mmol/L Potassium 3.8 3.5 - 5.1 mmol/L Chloride 104 97 - 108 mmol/L  
 CO2 25 21 - 32 mmol/L Anion gap 8 5 - 15 mmol/L Glucose 205 (H) 65 - 100 mg/dL BUN 13 6 - 20 MG/DL Creatinine 0.57 0.55 - 1.02 MG/DL  
 BUN/Creatinine ratio 23 (H) 12 - 20 GFR est AA >60 >60 ml/min/1.73m2 GFR est non-AA >60 >60 ml/min/1.73m2 Calcium 8.2 (L) 8.5 - 10.1 MG/DL  
GLUCOSE, POC Collection Time: 10/06/18 11:46 AM  
Result Value Ref Range Glucose (POC) 211 (H) 65 - 100 mg/dL Performed by Anderson Fleischer Assessment:  
 
Active Problems: 
  DKA (diabetic ketoacidoses) ([facility] [address] (6/2/2017) Plan: 1. Continue diabetic control. Ideally patient needs short acting insulin analog AC but under the current circumstances this would be difficult. We will for now rely on basal insulin realizing that optimal control will not occur. 2.  Hydration adequate.

## 2018-10-06 NOTE — PROGRESS NOTES
Pharmacy  Enoxaparin (Lovenox®) Monitoring Indication: VTE prophylaxis Current Dose: Enoxaparin 30 subcutaneously every 24 hours Creatinine Clearance (mL/min): ~70 mL/min Current Weight: 80.5 kg Labs: 
Recent Labs 10/06/18 
 0759  10/05/18 
 0444  10/04/18 18   10/04/18 
 4201 CREA  0.57  0.69  1.16*   < >  1.19* HGB   --   9.5*   --    --   9.7* PLT   --   214   --    --   227  
 < > = values in this interval not displayed. Wt Readings from Last 1 Encounters:  
10/06/18 80.5 kg (177 lb 7.5 oz) Ht Readings from Last 1 Encounters:  
09/09/18 160 cm (63\") Impression/Plan: ? Scr improved ? Adjusted enoxaparin regimen to 40 mg subcutaneously every 24 hours per protocol Thanks, 
 
Yancy Maradiaga, PharmD, [facility] Clinical [facility] Specialist

## 2018-10-07 LAB
ANION GAP SERPL CALC-SCNC: 10 MMOL/L (ref 5–15)
BUN SERPL-MCNC: 12 MG/DL (ref 6–20)
BUN/CREAT SERPL: 20 (ref 12–20)
CALCIUM SERPL-MCNC: 8.7 MG/DL (ref 8.5–10.1)
CHLORIDE SERPL-SCNC: 107 MMOL/L (ref 97–108)
CO2 SERPL-SCNC: 25 MMOL/L (ref 21–32)
CREAT SERPL-MCNC: 0.61 MG/DL (ref 0.55–1.02)
GLUCOSE BLD STRIP.AUTO-MCNC: 194 MG/DL (ref 65–100)
GLUCOSE BLD STRIP.AUTO-MCNC: 254 MG/DL (ref 65–100)
GLUCOSE BLD STRIP.AUTO-MCNC: 259 MG/DL (ref 65–100)
GLUCOSE BLD STRIP.AUTO-MCNC: 274 MG/DL (ref 65–100)
GLUCOSE BLD STRIP.AUTO-MCNC: 302 MG/DL (ref 65–100)
GLUCOSE BLD STRIP.AUTO-MCNC: 45 MG/DL (ref 65–100)
GLUCOSE SERPL-MCNC: 35 MG/DL (ref 65–100)
POTASSIUM SERPL-SCNC: 3.6 MMOL/L (ref 3.5–5.1)
SERVICE CMNT-IMP: ABNORMAL
SODIUM SERPL-SCNC: 142 MMOL/L (ref 136–145)

## 2018-10-07 PROCEDURE — 74011250637 HC RX REV CODE- 250/637: Performed by: INTERNAL MEDICINE

## 2018-10-07 PROCEDURE — 74011636637 HC RX REV CODE- 636/637: Performed by: INTERNAL MEDICINE

## 2018-10-07 PROCEDURE — 82962 GLUCOSE BLOOD TEST: CPT

## 2018-10-07 PROCEDURE — 74011000250 HC RX REV CODE- 250: Performed by: EMERGENCY MEDICINE

## 2018-10-07 PROCEDURE — 94760 N-INVAS EAR/PLS OXIMETRY 1: CPT

## 2018-10-07 PROCEDURE — 80048 BASIC METABOLIC PNL TOTAL CA: CPT | Performed by: INTERNAL MEDICINE

## 2018-10-07 PROCEDURE — 36415 COLL VENOUS BLD VENIPUNCTURE: CPT | Performed by: INTERNAL MEDICINE

## 2018-10-07 PROCEDURE — 65270000015 HC RM PRIVATE ONCOLOGY

## 2018-10-07 PROCEDURE — 74011250636 HC RX REV CODE- 250/636: Performed by: INTERNAL MEDICINE

## 2018-10-07 RX ORDER — INSULIN GLARGINE 100 [IU]/ML
30 INJECTION, SOLUTION SUBCUTANEOUS DAILY
Status: DISCONTINUED | OUTPATIENT
Start: 2018-10-07 | End: 2018-10-09 | Stop reason: HOSPADM

## 2018-10-07 RX ORDER — INSULIN GLARGINE 100 [IU]/ML
32 INJECTION, SOLUTION SUBCUTANEOUS DAILY
Status: DISCONTINUED | OUTPATIENT
Start: 2018-10-07 | End: 2018-10-07

## 2018-10-07 RX ADMIN — DEXTROSE MONOHYDRATE 25 G: 25 INJECTION, SOLUTION INTRAVENOUS at 07:42

## 2018-10-07 RX ADMIN — LOSARTAN POTASSIUM 100 MG: 100 TABLET, FILM COATED ORAL at 08:25

## 2018-10-07 RX ADMIN — ENOXAPARIN SODIUM 40 MG: 30 INJECTION, SOLUTION INTRAVENOUS; SUBCUTANEOUS at 18:32

## 2018-10-07 RX ADMIN — AMLODIPINE BESYLATE 10 MG: 5 TABLET ORAL at 08:25

## 2018-10-07 RX ADMIN — Medication 10 ML: at 14:20

## 2018-10-07 RX ADMIN — Medication 10 ML: at 06:00

## 2018-10-07 RX ADMIN — INSULIN GLARGINE 30 UNITS: 100 INJECTION, SOLUTION SUBCUTANEOUS at 09:40

## 2018-10-07 NOTE — PROGRESS NOTES
Problem: Falls - Risk of 
Goal: *Absence of Falls Document Darian Hodges Fall Risk and appropriate interventions in the flowsheet. Fall Risk Interventions: 
Mobility Interventions: Patient to call before getting OOB Mentation Interventions: Adequate sleep, hydration, pain control Medication Interventions: Teach patient to arise slowly Elimination Interventions: Call light in reach History of Falls Interventions: Door open when patient unattended

## 2018-10-07 NOTE — PROGRESS NOTES
General Daily Progress Note Admit Date: 10/3/2018 Subjective:  
 
Patient was confused this morning secondary to hypoglycemia. Edgar Goodwin Current Facility-Administered Medications Medication Dose Route Frequency  insulin glargine (LANTUS) injection 30 Units  30 Units SubCUTAneous DAILY  enoxaparin (LOVENOX) injection 40 mg  40 mg SubCUTAneous Q24H  
 amLODIPine (NORVASC) tablet 10 mg  10 mg Oral DAILY  losartan (COZAAR) tablet 100 mg  100 mg Oral DAILY  glucose chewable tablet 16 g  4 Tab Oral PRN  
 dextrose (D50W) injection syrg 12.5-25 g  25-50 mL IntraVENous PRN  
 glucagon (GLUCAGEN) injection 1 mg  1 mg IntraMUSCular PRN  
 sodium chloride (NS) flush 5-10 mL  5-10 mL IntraVENous Q8H  
 sodium chloride (NS) flush 5-10 mL  5-10 mL IntraVENous PRN Review of Systems A comprehensive review of systems was negative. Objective:  
 
Patient Vitals for the past 24 hrs: 
 BP Temp Pulse Resp SpO2  
10/07/18 0755 173/67 98.1 °F (36.7 °C) 86 20 96 % 10/06/18 2328 140/60 97.9 °F (36.6 °C) 72 18 96 % 10/06/18 1933 154/60 98.4 °F (36.9 °C) 74 20 97 % 10/06/18 1500 140/62 98.4 °F (36.9 °C) 81 20 100 % 10/05 1901 - 10/07 0700 In: 3508 [I.V.:1085] Out: 505 [WPNQI:725] Physical Exam:  
Visit Vitals  /67 (BP 1 Location: Right arm, BP Patient Position: At rest)  Pulse 86  Temp 98.1 °F (36.7 °C)  Resp 20  Wt 177 lb 7.5 oz (80.5 kg)  SpO2 96%  BMI 31.44 kg/m2 General appearance: alert, cooperative, no distress, appears stated age Neck: supple, symmetrical, trachea midline, no adenopathy, thyroid: not enlarged, symmetric, no tenderness/mass/nodules, no carotid bruit and no JVD Lungs: clear to auscultation bilaterally Heart: regular rate and rhythm, S1, S2 normal, no murmur, click, rub or gallop Abdomen: soft, non-tender. Bowel sounds normal. No masses,  no organomegaly Extremities: extremities normal, atraumatic, no cyanosis or edema Data Review Recent Results (from the past 24 hour(s)) GLUCOSE, POC Collection Time: 10/06/18 11:46 AM  
Result Value Ref Range Glucose (POC) 211 (H) 65 - 100 mg/dL Performed by Gilda Hebert GLUCOSE, POC Collection Time: 10/06/18  9:08 PM  
Result Value Ref Range Glucose (POC) 240 (H) 65 - 100 mg/dL Performed by Irwin Aguilar [facility]   
METABOLIC PANEL, BASIC Collection Time: 10/07/18  6:18 AM  
Result Value Ref Range Sodium 142 136 - 145 mmol/L Potassium 3.6 3.5 - 5.1 mmol/L Chloride 107 97 - 108 mmol/L  
 CO2 25 21 - 32 mmol/L Anion gap 10 5 - 15 mmol/L Glucose 35 (LL) 65 - 100 mg/dL BUN 12 6 - 20 MG/DL Creatinine 0.61 0.55 - 1.02 MG/DL  
 BUN/Creatinine ratio 20 12 - 20 GFR est AA >60 >60 ml/min/1.73m2 GFR est non-AA >60 >60 ml/min/1.73m2 Calcium 8.7 8.5 - 10.1 MG/DL  
GLUCOSE, POC Collection Time: 10/07/18  7:40 AM  
Result Value Ref Range Glucose (POC) 45 (LL) 65 - 100 mg/dL Performed by Ct Lugo GLUCOSE, POC Collection Time: 10/07/18  7:57 AM  
Result Value Ref Range Glucose (POC) 194 (H) 65 - 100 mg/dL Performed by Yves Maldonado Assessment:  
 
Active Problems: 
  DKA (diabetic [facility]acidoses) ([facility] (6/2/2017) Plan: 1. Nocturnal hypoglycemia which is her recurring pattern in spite of significant hyperglycemia during the daytime. She is on no sliding scale coverage. She states that she got an at bedtime snack but was rather small in the context of calories. I do not know how well she ate yesterday either. In any event her basal insulin will be reduced to 30 units eliminating the 6 units that I added yesterday to make a 36. Regardless of the type of basal insulin used she always develops nocturnal hypoglycemia even at bedtime blood sugar is in the 200 range. I therefore will request at bedtime snack of at least 350 viola nightly. 2.  Hydration is adequate.

## 2018-10-07 NOTE — PROGRESS NOTES
Bedside and Verbal shift change report given to pooja sullivan (oncoming nurse) by London Arreola (offgoing nurse). Report included the following information SBAR, Kardex, Intake/Output, MAR and Recent Results.

## 2018-10-08 LAB
ANION GAP SERPL CALC-SCNC: 4 MMOL/L (ref 5–15)
BACTERIA SPEC CULT: NORMAL
BUN SERPL-MCNC: 14 MG/DL (ref 6–20)
BUN/CREAT SERPL: 22 (ref 12–20)
CALCIUM SERPL-MCNC: 8.5 MG/DL (ref 8.5–10.1)
CHLORIDE SERPL-SCNC: 106 MMOL/L (ref 97–108)
CO2 SERPL-SCNC: 28 MMOL/L (ref 21–32)
CREAT SERPL-MCNC: 0.64 MG/DL (ref 0.55–1.02)
GLUCOSE BLD STRIP.AUTO-MCNC: 267 MG/DL (ref 65–100)
GLUCOSE BLD STRIP.AUTO-MCNC: 300 MG/DL (ref 65–100)
GLUCOSE BLD STRIP.AUTO-MCNC: 324 MG/DL (ref 65–100)
GLUCOSE BLD STRIP.AUTO-MCNC: 84 MG/DL (ref 65–100)
GLUCOSE SERPL-MCNC: 144 MG/DL (ref 65–100)
POTASSIUM SERPL-SCNC: 3.9 MMOL/L (ref 3.5–5.1)
SERVICE CMNT-IMP: ABNORMAL
SERVICE CMNT-IMP: NORMAL
SERVICE CMNT-IMP: NORMAL
SODIUM SERPL-SCNC: 138 MMOL/L (ref 136–145)

## 2018-10-08 PROCEDURE — 74011636637 HC RX REV CODE- 636/637: Performed by: INTERNAL MEDICINE

## 2018-10-08 PROCEDURE — 36415 COLL VENOUS BLD VENIPUNCTURE: CPT | Performed by: INTERNAL MEDICINE

## 2018-10-08 PROCEDURE — 74011250637 HC RX REV CODE- 250/637: Performed by: INTERNAL MEDICINE

## 2018-10-08 PROCEDURE — 82962 GLUCOSE BLOOD TEST: CPT

## 2018-10-08 PROCEDURE — 65270000015 HC RM PRIVATE ONCOLOGY

## 2018-10-08 PROCEDURE — 80048 BASIC METABOLIC PNL TOTAL CA: CPT | Performed by: INTERNAL MEDICINE

## 2018-10-08 PROCEDURE — 74011250636 HC RX REV CODE- 250/636: Performed by: INTERNAL MEDICINE

## 2018-10-08 PROCEDURE — 94760 N-INVAS EAR/PLS OXIMETRY 1: CPT

## 2018-10-08 RX ADMIN — Medication 10 ML: at 21:19

## 2018-10-08 RX ADMIN — ENOXAPARIN SODIUM 40 MG: 30 INJECTION, SOLUTION INTRAVENOUS; SUBCUTANEOUS at 17:16

## 2018-10-08 RX ADMIN — Medication 10 ML: at 13:57

## 2018-10-08 RX ADMIN — AMLODIPINE BESYLATE 10 MG: 5 TABLET ORAL at 08:07

## 2018-10-08 RX ADMIN — INSULIN GLARGINE 30 UNITS: 100 INJECTION, SOLUTION SUBCUTANEOUS at 08:10

## 2018-10-08 RX ADMIN — LOSARTAN POTASSIUM 100 MG: 100 TABLET, FILM COATED ORAL at 08:07

## 2018-10-08 NOTE — PROGRESS NOTES
Oncology Nursing Communication Tool 6:52 AM 
10/8/2018 Bedside and Verbal shift change report given to George Dawkins (incoming nurse) by Kristopher Alcantar CNA (outgoing nurse) on [employer]. Report included the following information SBAR and Kardex. Shift Summary: pt rested and blood sugars were controlled Issues for physician to address: Oncology Shift Note Admission Date 10/3/2018 Admission Diagnosis DKA (diabetic ketoacidoses) ([facility] [address] Code Status Full Code Consults None Cardiac Monitoring [] Yes [x] No  
  
Purposeful Hourly Rounding [x] Yes   
Tyler Score Total Score: 3 Tyler score 3 or > [] Bed Alarm [] Avasys [] 1:1 sitter [] Patient refused (Place signed refusal form in chart) Pain Managed [x] Yes [] No  
 Key Pain Meds   
    
  
 aspirin/salicylamide/caffeine (BC HEADACHE POWDER PO) Take 1 Packet by mouth three (3) times daily as needed (back pain). Influenza Vaccine Received Flu Vaccine for Current Season (usually Sept-March): No  
 Patient/Guardian Refused (Notify MD): Yes Oxygen needs? [x] Room air Oxygen @  []1L    []2L    []3L   []4L    []5L   []6L Use home O2? [] Yes [] No 
Perform O2 challenge test using  smartphrase (.oxygenchallenge) Last bowel movement Last Bowel Movement Date: 10/06/18 
bowel movement Urinary Catheter External Female Catheter 10/03/18-Urine Output (mL): 0 ml LDAs Peripheral IV 10/03/18 Right Wrist (Active) Site Assessment Clean, dry, & intact 10/8/2018  4:32 AM  
Phlebitis Assessment 0 10/8/2018  4:32 AM  
Infiltration Assessment 0 10/8/2018  4:32 AM  
Dressing Status Clean, dry, & intact 10/8/2018  4:32 AM  
Dressing Type Transparent;Tape 10/8/2018  4:32 AM  
Hub Color/Line Status Pink; Infusing 10/8/2018  4:32 AM  
   
Peripheral IV 10/03/18 Left Antecubital (Active) Site Assessment Clean, dry, & intact 10/8/2018  4:32 AM  
Phlebitis Assessment 0 10/8/2018  4:32 AM  
 Infiltration Assessment 0 10/8/2018  4:32 AM  
Dressing Status Clean, dry, & intact 10/8/2018  4:32 AM  
Dressing Type Transparent;Tape 10/8/2018  4:32 AM  
Hub Color/Line Status Green; Infusing 10/8/2018  4:32 AM  
      
External Female Catheter 10/03/18 (Active) Site Assessment Clean, dry, & intact 10/4/2018  7:00 AM  
Repositioned Yes 10/4/2018  7:00 AM  
Perineal Care Yes 10/4/2018  8:58 AM  
Wick Changed No 10/4/2018  4:22 AM  
Suction Canister/Tubing Changed No 10/4/2018  4:22 AM  
Urine Output (mL) 0 ml 10/3/2018 11:44 PM  
            
  
Readmission Risk Assessment Tool Score Medium Risk 12 Total Score 3 Has Seen PCP in Last 6 Months (Yes=3, No=0)  
 4 IP Visits Last 12 Months (1-3=4, 4=9, >4=11) 5 Pt. Coverage (Medicare=5 , Medicaid, or Self-Pay=4) 4 Charlson Comorbidity Score (Age + Comorbid Conditions) Criteria that do not apply:  
 . Living with Significant Other. Assisted Living. LTAC. SNF. or  
Rehab Patient Length of Stay (>5 days = 3) Expected Length of Stay 2d 21h Actual Length of Stay 5 Bella Hernandez CNA

## 2018-10-08 NOTE — PROGRESS NOTES
Problem: Falls - Risk of 
Goal: *Absence of Falls Document Pawan Chowdary Fall Risk and appropriate interventions in the flowsheet. Fall Risk Interventions: 
Mobility Interventions: Communicate number of staff needed for ambulation/transfer Mentation Interventions: Adequate sleep, hydration, pain control Medication Interventions: Teach patient to arise slowly, Evaluate medications/consider consulting pharmacy Elimination Interventions: Call light in reach History of Falls Interventions: Room close to nurse's station, Investigate reason for fall, Evaluate medications/consider consulting pharmacy

## 2018-10-08 NOTE — PROGRESS NOTES
Spiritual Care Assessment/Progress Note [address] 
 
 
NAME: Willa Severe      MRN: [id number] AGE: 69 y.o. SEX: female Faith Affiliation: Jain  
Language: English  
 
10/8/2018     Total Time (in minutes): 15 Spiritual Assessment begun in [facility] MED[facility] ONCOLOGY through conversation with: 
  
    [x]Patient        [] Family    [] Friend(s) Reason for Consult: Initial/Spiritual assessment, patient floor Spiritual beliefs: (Please include comment if needed) [x] Identifies with a erlinda tradition:     
   [x] Supported by a erlinda community:    [address] [place] [address]   
   [] Claims no spiritual orientation:       
   [] Seeking spiritual identity:            
   [] Adheres to an individual form of spirituality:       
   [] Not able to assess:                   
 
    
Identified resources for coping:  
   [x] Prayer                           
   [] Music                  [] Guided Imagery [x] Family/friends                 [] Pet visits [] Devotional reading                         [] Unknown 
   [] Other Interventions offered during this visit: (See comments for more details) Patient Interventions: Affirmation of erlinda, Catharsis/review of pertinent events in supportive environment, Coping skills reviewed/reinforced, Iconic (affirming the presence of God/Higher Power), Initial/Spiritual assessment, patient floor, Prayer (assurance of), Faith beliefs/image of God discussed Plan of Care: 
 
 [] Support spiritual and/or cultural needs  
 [] Support AMD and/or advance care planning process    
 [] Support grieving process 
 [] Coordinate Rites and/or Rituals  
 [] Coordination with community clergy 
 [x] No spiritual needs identified at this time 
 [] Detailed Plan of Care below (See Comments)  [] Make referral to Music Therapy 
[] Make referral to Pet Therapy [] Make referral to Addiction services 
[] Make referral to [facility] 
[] Make referral to Spiritual Care Partner 
[] No future visits requested       
[] Follow up visits as needed Comments:   Supportive visit on Oncology unit for spiritual assessment. No visitors present. Patient shared that she is feeling much better now. ..that issues related to admission have resolved. She expects to go home tomorrow. She lives with two of her sons and they are helpful. She has another son and a daughter who both live locally. She had no needs or concerns at this time. Ms. Milla Davison belongs to [place] [id number]. Visit ended quickly when she received a long distance phone call. Offered assurance of prayer. Sarah Easley MPS, [facility] Paging Service  [phone])

## 2018-10-08 NOTE — DIABETES MGMT
DTC Consult Note Recommendations/ Comments: Please consider adding humalog 4 units ac tid to aid in controlling day time BG rises with po intake. With addition of prandial insulin would also consider further reduction in lantus to 25 units daily. Recommend titrating lantus as needed for elevated fasting BG and prandial humalog as needed for elevated daytime BG. Current hospital DM medication: lantus 30 units daily DTC will continue to follow patient as needed. ____________________________ Consult received for:   []           Hospital Medication Recommendations [x]           Hospital Blood Glucose Management Chart reviewed and initial evaluation complete on Deana Chase. Patient is a 69 y.o. female with known Type 2 Diabetes on levemir 30 units daily at home. A1c:  
Lab Results Component Value Date/Time Hemoglobin A1c 10.1 (H) 10/03/2018 01:16 PM  
 
 
Recent Glucose Results:  
Lab Results Component Value Date/Time  (H) 10/08/2018 04:20 AM  
 GLUCPOC 267 (H) 10/08/2018 12:21 PM  
 GLUCPOC 84 10/08/2018 07:29 AM  
 GLUCPOC 254 (H) 10/07/2018 09:49 PM  
  
 
Lab Results Component Value Date/Time Creatinine 0.64 10/08/2018 04:20 AM  
 
 
Active Orders Diet DIET DIABETIC CONSISTENT CARB Regular PO intake:  
Patient Vitals for the past 72 hrs: 
 % Diet Eaten 10/07/18 1848 90 % 10/07/18 1200 100 % 10/07/18 0800 100 % Thank you. Demond Morgan, BSN, RN, CDE [facility]

## 2018-10-09 ENCOUNTER — HOME HEALTH ADMISSION (OUTPATIENT)
Dept: HOME HEALTH SERVICES | Facility: HOME HEALTH | Age: 78
End: 2018-10-09

## 2018-10-09 VITALS
BODY MASS INDEX: 30.82 KG/M2 | HEIGHT: 63 IN | RESPIRATION RATE: 18 BRPM | OXYGEN SATURATION: 100 % | HEART RATE: 74 BPM | SYSTOLIC BLOOD PRESSURE: 149 MMHG | DIASTOLIC BLOOD PRESSURE: 53 MMHG | TEMPERATURE: 98.2 F | WEIGHT: 173.94 LBS

## 2018-10-09 LAB
ANION GAP SERPL CALC-SCNC: 7 MMOL/L (ref 5–15)
BUN SERPL-MCNC: 15 MG/DL (ref 6–20)
BUN/CREAT SERPL: 25 (ref 12–20)
CALCIUM SERPL-MCNC: 8.1 MG/DL (ref 8.5–10.1)
CHLORIDE SERPL-SCNC: 106 MMOL/L (ref 97–108)
CO2 SERPL-SCNC: 25 MMOL/L (ref 21–32)
CREAT SERPL-MCNC: 0.59 MG/DL (ref 0.55–1.02)
GLUCOSE BLD STRIP.AUTO-MCNC: 113 MG/DL (ref 65–100)
GLUCOSE BLD STRIP.AUTO-MCNC: 157 MG/DL (ref 65–100)
GLUCOSE BLD STRIP.AUTO-MCNC: 231 MG/DL (ref 65–100)
GLUCOSE BLD STRIP.AUTO-MCNC: 65 MG/DL (ref 65–100)
GLUCOSE SERPL-MCNC: 169 MG/DL (ref 65–100)
POTASSIUM SERPL-SCNC: 3.9 MMOL/L (ref 3.5–5.1)
SERVICE CMNT-IMP: ABNORMAL
SERVICE CMNT-IMP: NORMAL
SODIUM SERPL-SCNC: 138 MMOL/L (ref 136–145)

## 2018-10-09 PROCEDURE — 36415 COLL VENOUS BLD VENIPUNCTURE: CPT | Performed by: INTERNAL MEDICINE

## 2018-10-09 PROCEDURE — 82962 GLUCOSE BLOOD TEST: CPT

## 2018-10-09 PROCEDURE — 74011250637 HC RX REV CODE- 250/637: Performed by: INTERNAL MEDICINE

## 2018-10-09 PROCEDURE — 74011636637 HC RX REV CODE- 636/637: Performed by: INTERNAL MEDICINE

## 2018-10-09 PROCEDURE — 80048 BASIC METABOLIC PNL TOTAL CA: CPT | Performed by: INTERNAL MEDICINE

## 2018-10-09 RX ADMIN — LOSARTAN POTASSIUM 100 MG: 100 TABLET, FILM COATED ORAL at 08:41

## 2018-10-09 RX ADMIN — Medication 10 ML: at 06:18

## 2018-10-09 RX ADMIN — INSULIN GLARGINE 30 UNITS: 100 INJECTION, SOLUTION SUBCUTANEOUS at 08:42

## 2018-10-09 RX ADMIN — AMLODIPINE BESYLATE 10 MG: 5 TABLET ORAL at 08:42

## 2018-10-09 NOTE — DISCHARGE INSTRUCTIONS
General Discharge Instructions    Patient ID:  Margarito Conroy  [id number]  69 y.o.  1940    Patient Instructions  It is mandatory that you eat at the same time every day. This means breakfast at 9 AM lunch at 1 PM and dinner between 5 and 6 PM.  You have to also eat a bedtime snack at 9-10 PM to include at least 2 packets of peanut butter crackers. You cannot miss any meals and all meals have to be completely consumed. The following personal items were collected during your admission and were returned to you. Take Home Medications           What to do at Home    Recommended diet: Diabetic Diet    Recommended activity: Activity as tolerated    Follow-up with Harshad Childers MD  in 3 days. Information obtained by :  I understand that if any problems occur once I am at home I am to contact my physician. I understand and acknowledge receipt of the instructions indicated above.                                                                                                                                            Physician's or R.N.'s Signature                                                                  Date/Time                                                                                                                                              Patient or Representative Signature                                                          Date/Time

## 2018-10-09 NOTE — PROGRESS NOTES
PCP ALEKSANDAR appt scheduled with  on 10/15/2018 at 1:15pm. Appt added to AVS. CELINE Calderon CM Specialist

## 2018-10-09 NOTE — PROGRESS NOTES
General Daily Progress Note Admit Date: 10/3/2018 Subjective:  
 
Patient has no complaint Current Facility-Administered Medications Medication Dose Route Frequency  insulin glargine (LANTUS) injection 30 Units  30 Units SubCUTAneous DAILY  enoxaparin (LOVENOX) injection 40 mg  40 mg SubCUTAneous Q24H  
 amLODIPine (NORVASC) tablet 10 mg  10 mg Oral DAILY  losartan (COZAAR) tablet 100 mg  100 mg Oral DAILY  glucose chewable tablet 16 g  4 Tab Oral PRN  
 dextrose (D50W) injection syrg 12.5-25 g  25-50 mL IntraVENous PRN  
 glucagon (GLUCAGEN) injection 1 mg  1 mg IntraMUSCular PRN  
 sodium chloride (NS) flush 5-10 mL  5-10 mL IntraVENous Q8H  
 sodium chloride (NS) flush 5-10 mL  5-10 mL IntraVENous PRN Review of Systems A comprehensive review of systems was negative. Objective:  
 
Patient Vitals for the past 24 hrs: 
 BP Temp Pulse Resp SpO2  
10/08/18 1943 157/58 97.7 °F (36.5 °C) 72 20 100 % 10/08/18 1557 150/64 98.9 °F (37.2 °C) 71 24 100 % 10/08/18 0800 131/69 98.5 °F (36.9 °C) 67 18 97 % 10/07 0701 - 10/08 1900 In: -  
Out: 1 [Urine:1] Physical Exam:  
Visit Vitals  /58 (BP 1 Location: Left arm, BP Patient Position: At rest)  Pulse 72  Temp 97.7 °F (36.5 °C)  Resp 20  Wt 177 lb 7.5 oz (80.5 kg)  SpO2 100%  BMI 31.44 kg/m2 General appearance: alert, cooperative, no distress, appears stated age Neck: supple, symmetrical, trachea midline, no adenopathy, thyroid: not enlarged, symmetric, no tenderness/mass/nodules, no carotid bruit and no JVD Lungs: clear to auscultation bilaterally Heart: regular rate and rhythm, S1, S2 normal, no murmur, click, rub or gallop Abdomen: soft, non-tender. Bowel sounds normal. No masses,  no organomegaly Extremities: extremities normal, atraumatic, no cyanosis or edema

## 2018-10-09 NOTE — PROGRESS NOTES
Oncology CM completed chart review. Noted DC order. Pt DTR will be here around 4 pm today to transport her home. CM offered Hospital to Home visit with [facility] for DM. Pt was agreeable to [facility]. FOC on bedside chart. CM talked to [facility] and confirmed they can accept. Added information to AVS. CM specialist made PCP appt. IM letter given and placed on bedside chart. No further CM needs. Care Management Interventions PCP Verified by CM: Yes 
Palliative Care Criteria Met (RRAT>21 & CHF Dx)?: No 
Mode of Transport at Discharge: Other (see comment) Transition of Care Consult (CM Consult): Home Health [facility] - INPATIENT: Yes MyChart Signup: No 
Discharge Durable Medical Equipment: No 
Physical Therapy Consult: No 
Occupational Therapy Consult: No 
Speech Therapy Consult: No 
Current Support Network: Lives Alone, Own Home, Family Lives [place] Confirm Follow Up Transport: Family Plan discussed with Pt/Family/Caregiver: Yes Freedom of Choice Offered: Yes Discharge Location Discharge Placement: Home with home health Jan Doe CM Ext [phone]

## 2018-10-10 ENCOUNTER — PATIENT OUTREACH (OUTPATIENT)
Dept: INTERNAL MEDICINE CLINIC | Age: 78
End: 2018-10-10

## 2018-10-10 NOTE — LETTER
10/10/2018 12:43 PM 
 
Ms. Loza [address] [address] Dear Ms. Moshenalini Ulloa am the R.N. Nurse Navigator working with Dr. Robyn French. We are glad to hear you are home and hope you are feeling much better today. Thank you for the time spent during our call,  as we discussed your past hospital visit as well as things to help you. Part of my job is to follow up with patients who have been to the hospital to see how they are feeling, answer any questions they may have about their visit and also make sure they have a follow-up appointment to see their primary care doctor. As we discussed, your follow-up visit is Monday Oct. 15th at 1:15pm.  We look forward to seeing you. In the meantime, if you have any questions or concerns, please feel free to call me on my direct line at [phone] or the office number above, for a physician on-call if after hours. Thank you for allowing us to provide you with excellent care. Our goal is to address all of your concerns and needs. We strive to provide excellent quality care at our Medical Practice here at [facility]. We strive to be rated as excellent, as anything less than excellent does not meet our expectations. Thank you again for choosing us for your continued health care needs. Sincerely, Zamzam Galvan RN

## 2018-10-10 NOTE — PROGRESS NOTES
[facility] 10/3-10/9/2018:  Diabetic Ketoacidosis Hospital Discharge Follow-Up Date/Time:  10/10/2018 12:14 PM 
 
Patient was admitted to [facility] on 10/3/2018 and discharged on 10/9/2018 for DKA. The physician discharge summary was available at the time of outreach. Patient was contacted within 2 business days of discharge. Top Challenges reviewed with the provider [address] [place] [address] visit Method  communication with mary: Message Inpatient RRAT score: 19 Was this a readmission? no  
Patient stated reason for the readmission: n/a Nurse Navigator (NN) contacted the patient by telephone to perform post hospital discharge assessment. Verified name and  with patient as identifiers. Provided introduction to self, and explanation of the Nurse Navigator role. Reviewed discharge instructions and red flags with patient who verbalized understanding. Patient given an opportunity to ask questions and does not have any further questions or concerns at this time. The patient agrees to contact the PCP office for questions related to their healthcare. NN provided contact information for future reference. Disease Specific:   Diabetes Summary of patient's top problems: 1. Nocturnal hypoglycemia 2. Avoiding night snacks. Home Health orders at discharge: yes Home Health company: [facility] Date of initial visit: 10/11/2018 Durable Medical Equipment ordered/company: none Durable Medical Equipment received: n/a Barriers to care? Lack of education re night drops of BS & prevention thereof. Advance Care Planning:  
Does patient have an Advance Directive:  not on file; education provided -patient agreed to review information at office visit on 10/15/2018 Medication(s):  
New Medications at Discharge: none Changed Medications at Discharge: n/a Discontinued Medications at Discharge: n/a Medication reconciliation was performed with patient, who verbalizes understanding of administration of home medications. There were no barriers to obtaining medications identified at this time. Referral to Pharm D needed: no  
 
Current Outpatient Prescriptions Medication Sig  
 insulin detemir U-100 (LEVEMIR FLEXTOUCH) 100 unit/mL (3 mL) inpn 30 units every morning3  
 pravastatin (PRAVACHOL) 80 mg tablet Take 80 mg by mouth daily.  fenofibrate nanocrystallized (TRICOR) 145 mg tablet TAKE 1 TABLET BY MOUTH EVERY DAY  levothyroxine (SYNTHROID) 150 mcg tablet TAKE 1 TABLET BY MOUTH EVERY DAY  clopidogrel (PLAVIX) 75 mg tab TAKE 1 TAB BY MOUTH DAILY.  amLODIPine (NORVASC) 10 mg tablet TAKE 1 TABLET BY MOUTH EVERY MORNING  
 losartan-hydroCHLOROthiazide (HYZAAR) 100-25 mg per tablet TAKE 1 TABLET BY MOUTH DAILY  brimonidine (ALPHAGAN) 0.15 % ophthalmic solution Administer 1 Drop to both eyes two (2) times a day. No current facility-administered medications for this visit. There are no discontinued medications. [facility] follow up appointment(s): Future Appointments Date Time Provider Tawnya Evans 10/15/2018 1:15 PM Saskia Ruelas MD [address] [place] [address] Non-[facility] follow up appointment(s): none [facility]:  n/a  
 
Goals Addressed Most Recent  Attends follow-up appointments as directed. On track (10/10/2018) Pt will follow up as scheduled with Dr Sandra Monroe on 11/14. Patient refused to up office appointment until reviewing schedule for new appointments. 9/4/2018:  NN contacted patient as ALEKSANDAR d/c; today is following the holiday-patient stated she got her days mixed up as son was ready to bring her to the office appointment. NN rescheduled patient for 9/6/2018--patient unable to come tomorrow due to obligations.   EW  
 
10/10/2018:  [facility], and states to prefer to leave [facility] appointment on Monday 10/15/2018 1:15PM due to son already planned to bring her in which she wishes to be with her. NN provided office # and NN direct line for patient to call should she have questions or needs during and after office hours. EW  
  
  Patient verbalizes understanding of self -management goals of living with Diabetes. On track (10/10/2018) Patient will verbalize understanding of self -management goals of living with Diabetes. Presently, patient states she doesn't know what to do with the new diabetic medications. PCP ordered patient to come in to office today to [address] [place] [address]. 9/4/2018:  Patient asked if PCP had changed her insulin; stated she thinks due to her not eating much, it needs to be changed. Patient was to come for [employer] today but states she got days mixed up but agreed to reschedule. Patient encouraged to decide foods she likes and try to eat the proper amounts from those rather than a full course meal she does not like. Patient agreed to make a list of likes for the son who grocery shops will bring those foods. Patient states she will ask PCP to decrease insulin. EW  
 
10/10/2018:  Patient Teach-back done on S&S of hyperglycemia & Hypo; patient discussed the causes resulting in hospital visit & what to do to avoid. Patient states she had been eating candy to prevent hypoglycemia; Review done on fast acting carbs to alleviate low BS. Patient agreed to check BS BID including before bed; stated PCP advised a night snack to prevent low BS drops during the night. NN mailed the Diabetes Hypoglycemia S&S Leaflet & Take Action Quickly if Low Blood Sugar leaflet by [employer]. EW   
  
  
Goal completion:  10/15/2018

## 2018-10-11 ENCOUNTER — HOME CARE VISIT (OUTPATIENT)
Dept: SCHEDULING | Facility: HOME HEALTH | Age: 78
End: 2018-10-11

## 2018-10-11 PROCEDURE — G0299 HHS/HOSPICE OF RN EA 15 MIN: HCPCS

## 2018-10-11 NOTE — DISCHARGE SUMMARY
[facility] [address]    Karen Berry  MR#: [id number]  : 1940  ACCOUNT #: [de-identified]   ADMIT DATE: 10/03/2018  DISCHARGE DATE: 10/09/2018    HISTORY OF PRESENT ILLNESS:  The patient is a 71-year-old lady who was doing well up until presumably the day of admission. She became extremely lethargic and confused. She presented to the emergency room, was found to be in diabetic ketoacidosis. There was no one to give any meaningful history as relates to her premorbid status and duration of illness. In view of this, however, the patient was admitted. No meaningful history could be obtained from her, however. The patient has a long history of diabetes mellitus, presumably type 1, in view of her past history of having ketoacidosis. Her compliance is extremely poor, primarily because of a problem with short-term memory. She does not remember to eat at the same time every day and, as a result, has frequent hypoglycemia. Attempts to get in reasonable control have been extremely difficult if not impossible. PAST MEDICAL HISTORY, SOCIAL HISTORY, REVIEW OF SYSTEMS, FAMILY HISTORY, PHYSICAL EXAMINATION:  As in the admitting H and P.    LABORATORY VALUES:  Abnormalities on the comprehensive profile on admission revealed a CO2 of 5, BUN and creatinine 56 and 1.94, with a blood sugar of 837. Lactic acid was elevated predictably at 5.4, repeat 4.0. At the time of discharge, BUN and creatinine were 15 and 0.59 with a CO2 of 25. Urinalysis:  Greater than 1000 mg/dL glycosuria. Initial hemoglobin was 10.5, white count 15.6, MCV was 98.2, platelet count 809,399 with the following differential:  86 segs, 6 lymphocytes, 6 monocytes, and 2 immature granulocytes. Blood cultures were negative. RADIOGRAPHS:  Chest x-ray negative. HOSPITAL COURSE:  The patient was admitted and was quite confused as would be somewhat expected given the degree of ketoacidosis that was present. Her pH on admission was 7. 13. She was placed on an insulin drip with appropriate assays of blood sugar, CO2, and potassium. Fairly rapidly her numbers improved significantly. There was somewhat of a lag with her mental status, although each day was better such that after 72 hours she was back to her baseline. She was subsequently transferred to the floor where she continued to convalesce. I watched her for several days until stabilization of her blood sugar occurred. As previously happened, she would oftentimes have a significant reduction in blood sugars in the early morning, and blood sugars during the day were in 200 and 300 range. She did not receive any prandial insulin. She could not remember the details surrounding her acute illness. She states that she was indeed compliant with her insulin. FINAL DIAGNOSES:  1. Diabetic ketoacidosis. 2.  Diabetes mellitus type 1, poorly controlled. 3.  History of noncompliance. 4.  Short-term memory defect, possibly early dementia. 5.  Primary hypertension. 6.  History of diastolic dysfunction. 7.  Dyslipidemia. DISPOSITION:  The patient being discharged home ambulatory on an ADA diet with a bedtime snack. For her bedtime snack, she is to consume at least 300-350 calories. I talked to the son and emphasized to him the importance of her eating at the same time every day and consuming her bedtime snack. DISCHARGE MEDICATIONS:  Include the following:  Amlodipine 10 mg daily, Alphagan 0.15% 1 drop both eyes b.i.d., clopidogrel 75 mg daily, TriCor 145 mg daily, Levemir 30 units q.a.m., Levoxyl 0.15 mg daily, losartan/hydrochlorothiazide 100/25 q.a.m., and Pravastatin 80 mg at bedtime. She will return to the office in the next 3 days. CHIDI Jurado MD       LAB/LN  D: 10/10/2018 20:47     T: 10/11/2018 10:06  JOB #: [id number]

## 2018-10-12 RX ORDER — PRAVASTATIN SODIUM 80 MG/1
80 TABLET ORAL DAILY
Qty: 90 TAB | Refills: 3 | Status: ON HOLD | OUTPATIENT
Start: 2018-10-12 | End: 2018-10-15 | Stop reason: SDUPTHER

## 2018-10-12 RX ORDER — LOSARTAN POTASSIUM AND HYDROCHLOROTHIAZIDE 25; 100 MG/1; MG/1
TABLET ORAL
Qty: 90 TAB | Refills: 3 | Status: SHIPPED | OUTPATIENT
Start: 2018-10-12 | End: 2018-10-25 | Stop reason: SDUPTHER

## 2018-10-15 ENCOUNTER — OFFICE VISIT (OUTPATIENT)
Dept: INTERNAL MEDICINE CLINIC | Age: 78
End: 2018-10-15

## 2018-10-15 ENCOUNTER — PATIENT OUTREACH (OUTPATIENT)
Dept: INTERNAL MEDICINE CLINIC | Age: 78
End: 2018-10-15

## 2018-10-15 VITALS
BODY MASS INDEX: 29.73 KG/M2 | RESPIRATION RATE: 16 BRPM | DIASTOLIC BLOOD PRESSURE: 64 MMHG | TEMPERATURE: 98.3 F | OXYGEN SATURATION: 98 % | SYSTOLIC BLOOD PRESSURE: 166 MMHG | HEART RATE: 82 BPM | HEIGHT: 63 IN | WEIGHT: 167.8 LBS

## 2018-10-15 DIAGNOSIS — E10.65 HYPERGLYCEMIA DUE TO TYPE 1 DIABETES MELLITUS (HCC): Primary | ICD-10-CM

## 2018-10-15 DIAGNOSIS — I10 ESSENTIAL HYPERTENSION: ICD-10-CM

## 2018-10-15 DIAGNOSIS — R41.3 MEMORY DEFICIT: ICD-10-CM

## 2018-10-15 DIAGNOSIS — E78.5 DYSLIPIDEMIA: ICD-10-CM

## 2018-10-15 RX ORDER — FENOFIBRATE 145 MG/1
TABLET, COATED ORAL
Qty: 30 TAB | Refills: 11 | Status: CANCELLED | OUTPATIENT
Start: 2018-10-15

## 2018-10-15 RX ORDER — LOSARTAN POTASSIUM AND HYDROCHLOROTHIAZIDE 25; 100 MG/1; MG/1
TABLET ORAL
Qty: 90 TAB | Refills: 3 | Status: CANCELLED | OUTPATIENT
Start: 2018-10-15

## 2018-10-15 NOTE — MR AVS SNAPSHOT
Slava Lino 
 
 
 [address] [address] 
[phone] Patient: Imelda Rice MRN: [id number] Visit Information Date & Time Provider Department Dept. Phone Encounter #  
 10/15/2018  1:15 PM Yinka Gaviria MD [facility] [phone] [id number] Upcoming Health Maintenance Date Due Shingrix Vaccine Age 50> (1 of 2) 12/31/1991 LIPID PANEL Q1 7/14/2017 Influenza Age 5 to Adult 8/1/2018 MEDICARE YEARLY EXAM 10/13/2018 FOOT EXAM Q1 11/15/2018* EYE EXAM RETINAL OR DILATED Q1 11/15/2018* HEMOGLOBIN A1C Q6M 4/3/2019 MICROALBUMIN Q1 7/10/2019 GLAUCOMA SCREENING Q2Y 12/21/2019 DTaP/Tdap/Td series (2 - Td) 2/2/2027 *Topic was postponed. The date shown is not the original due date. Allergies as of 10/15/2018  Review Complete On: 10/15/2018 By: Dash Orellana No Known Allergies Current Immunizations  Reviewed on 12/25/2017 Name Date Influenza Vaccine Split  Deferred (Patient Refused) ZZZ-RETIRED (DO NOT USE) Pneumococcal Vaccine (Unspecified Type) 11/11/2012 12:57 PM  
  
 Not reviewed this visit Vitals BP Pulse Temp Resp Height(growth percentile) Weight(growth percentile) 166/64 82 98.3 °F (36.8 °C) (Oral) 16 5' 2.6\" (1.59 m) 167 lb 12.8 oz (76.1 kg) SpO2 BMI OB Status Smoking Status 98% 30.11 kg/m2 Hysterectomy Never Smoker Vitals History BMI and BSA Data Body Mass Index Body Surface Area  
 30.11 kg/m 2 1.83 m 2 Preferred Pharmacy Pharmacy Name Phone [id number] [address] [phone] Your Updated Medication List  
  
   
This list is accurate as of 10/15/18  2:44 PM.  Always use your most recent med list. amLODIPine 10 mg tablet Commonly known as:  Estefania Price TAKE 1 TABLET BY MOUTH EVERY MORNING  
  
 brimonidine 0.15 % ophthalmic solution Commonly known as:  Kuldip Helms Administer 1 Drop to both eyes two (2) times a day. clopidogrel 75 mg Tab Commonly known as:  PLAVIX TAKE 1 TAB BY MOUTH DAILY. fenofibrate nanocrystallized 145 mg tablet Commonly known as:  TRICOR  
TAKE 1 TABLET BY MOUTH EVERY DAY  
  
 insulin detemir U-100 100 unit/mL (3 mL) Inpn Commonly known as:  LEVEMIR FLEXTOUCH  
30 units every morning3  
  
 levothyroxine 150 mcg tablet Commonly known as:  SYNTHROID  
TAKE 1 TABLET BY MOUTH EVERY DAY  
  
 losartan-hydroCHLOROthiazide 100-25 mg per tablet Commonly known as:  HYZAAR  
TAKE 1 TABLET BY MOUTH DAILY pravastatin 80 mg tablet Commonly known as:  PRAVACHOL Take 1 Tab by mouth daily. Introducing [facility]! [facility] introduces Link To Media patient portal. Now you can access parts of your medical record, email your doctor's office, and request medication refills online. 1. In your internet browser, go to [email or web address] 2. Click on the First Time User? Click Here link in the Sign In box. You will see the New Member Sign Up page. 3. Enter your Link To [employer] Access Code exactly as it appears below. You will not need to use this code after youve completed the sign-up process. If you do not sign up before the expiration date, you must request a new code. · [employer] Access Code: [email or web address] Expires: 12/31/2018  5:22 AM 
 
4. Enter the last four digits of your Social Security Number (xxxx) and Date of Birth (mm/dd/yyyy) as indicated and click Submit. You will be taken to the next sign-up page. 5. Create a Link To Media ID. This will be your [employer] To [employer] login ID and cannot be changed, so think of one that is secure and easy to remember. 6. Create a [employer] password. You can change your password at any time. 7. Enter your Password Reset Question and Answer. This can be used at a later time if you forget your password. 8. Enter your e-mail address. You will receive e-mail notification when new information is available in [address] 9. Click Sign Up. You can now view and download portions of your medical record. 10. Click the Download Summary menu link to download a portable copy of your medical information. If you have questions, please visit the Frequently Asked Questions section of the [employer] website. Remember, [employer] is NOT to be used for urgent needs. For medical emergencies, dial 911. Now available from your iPhone and Android! Please provide this summary of care documentation to your next provider. Your primary care clinician is listed as William Doll. If you have any questions after today's visit, please call [phone].

## 2018-10-15 NOTE — PROGRESS NOTES
Hospital Discharge Follow-Up [facility] 10/3-10/9/2018:  Diabetic Ketoacidosis Date/Time:  10/15/2018 1144 AM 
 
Patient was admitted to [facility] on 10/3 and discharged on 10/9/2018 for diabetic Ketoacidosis. The physician discharge summary was available at the time of outreach. Patient was contacted within 2 business days of discharge. Top Challenges reviewed with the provider H Program.  
Medications:  Lorsartan; Provachol.to pharmacy Method of communication with provider :face to face Inpatient RRAT score: 19 Was this a readmission? no  
Patient stated reason for the readmission: n/a Nurse Navigator (NN) contacted the patient & daughter by telephone to perform post hospital discharge assessment. Verified name and  with patient & daughter as identifiers. Provided introduction to self, and explanation of the Nurse Navigator role. Reviewed discharge instructions and red flags with patient daughter who verbalized understanding. Patient  & daughter given an opportunity to ask questions and does not have any further questions or concerns at this time. The patient & daughter agrees to contact the PCP office for questions related to their healthcare. NN provided contact information for future reference. Disease Specific:   Diabetes Summary of patient's top problems: 
1. diabetes 2. Medication from d/c 
3. Hees assistance with med. Home Health orders at discharge: [facility] [address]: [facility] Date of initial visit: 10/11/2018 Durable Medical Equipment ordered/company: none Durable Medical Equipment received: n/a Barriers to care? Medication Management- 
 
Advance Care Planning:  
Does patient have an Advance Directive:  not on file; education provided to daughter. Medication(s):  
New Medications at Discharge: [address] [place] [address]. Changed Medications at Discharge: none Discontinued Medications at Discharge: none Poor medication control. Due to short memory per Hospital Note. Medication reconciliation was partially performed performed with daughter &  patient, daughter  verbalizes understanding of administration of home medications. Patient thought she was taking the meds. NN spoke with PharmD Eugenio Ennis & with PCP for medication call-in. Daughter agreed to pickup post [facility] office visit today. There were barriers to obtaining medications identified at this time as noted above. Referral to Pharm D needed: none Current Outpatient Prescriptions Medication Sig  
 losartan-hydroCHLOROthiazide (HYZAAR) 100-25 mg per tablet TAKE 1 TABLET BY MOUTH DAILY  pravastatin (PRAVACHOL) 80 mg tablet Take 1 Tab by mouth daily.  insulin detemir U-100 (LEVEMIR FLEXTOUCH) 100 unit/mL (3 mL) inpn 30 units every morning3  
 fenofibrate nanocrystallized (TRICOR) 145 mg tablet TAKE 1 TABLET BY MOUTH EVERY DAY  levothyroxine (SYNTHROID) 150 mcg tablet TAKE 1 TABLET BY MOUTH EVERY DAY  clopidogrel (PLAVIX) 75 mg tab TAKE 1 TAB BY MOUTH DAILY.  amLODIPine (NORVASC) 10 mg tablet TAKE 1 TABLET BY MOUTH EVERY MORNING  
 brimonidine (ALPHAGAN) 0.15 % ophthalmic solution Administer 1 Drop to both eyes two (2) times a day. No current facility-administered medications for this visit. There are no discontinued medications. [facility] follow up appointment(s): No future appointments. Non-[facility] follow up appointment(s): [facility]:  n/a  
 
 
Goals  Attends follow-up appointments as directed. Pt will follow up as scheduled with Dr Pavel Orr on 11/14. Patient refused to up office appointment until reviewing schedule for new appointments. 9/4/2018:  NN contacted patient as ALEKSANDAR d/c; today is following the holiday-patient stated she got her days mixed up as son was ready to bring her to the office appointment.   NN rescheduled patient for 9/6/2018--patient unable to come tomorrow due to obligations. EW  
 
10/10/2018:  Patient states [facility] visit starts tomorrow, and states to prefer to leave [facility] appointment on Monday 10/15/2018 1:15PM due to son already planned to bring her in which she wishes to be with her. NN provided office # and NN direct line for patient to call should she have questions or needs during and after office hours. EW  
  
  Patient verbalizes understanding of self -management goals of living with Diabetes. Patient will verbalize understanding of self -management goals of living with Diabetes. Presently, patient states she doesn't know what to do with the new diabetic medications. PCP ordered patient to come in to office today to [facility]. 9/4/2018:  Patient asked if PCP had changed her insulin; stated she thinks due to her not eating much, it needs to be changed. Patient was to come for [facility] today but states she got days mixed up but agreed to reschedule. Patient encouraged to decide foods she likes and try to eat the proper amounts from those rather than a full course meal she does not like. Patient agreed to make a list of likes for the son who grocery shops will bring those foods. Patient states she will ask PCP to decrease insulin. EW  
 
10/10/2018:  Patient Teach-back done on S&S of hyperglycemia & Hypo; patient discussed the causes resulting in hospital visit & what to do to avoid. Patient states she had been eating candy to prevent hypoglycemia; Review done on fast acting carbs to alleviate low BS. Patient agreed to check BS BID including before bed; stated PCP advised a night snack to prevent low BS drops during the night. NN mailed the Diabetes Hypoglycemia S&S Leaflet & Take Action Quickly if Low Blood Sugar leaflet by [employer]. EW   
  
  Takes Medications as prescribed   10/15/2018:  Goal:  Consult done w/ PCP; Patient's daughter Arabella Curry agreed to  meds today post ALEKSANDAR visit. EW 
 
  
  Understands red flags post discharge. Pt has agreed to contact PCP with any sxs of hyper/hypoglycemia. Patient sound weak compared to RAJNI RUVALCABA call 2 days ago. Patient stated ambulance was there early this morning. PCP advised patient to come to office immediately today. Goal completion:  10/16/2018

## 2018-10-15 NOTE — PROGRESS NOTES
Chief Complaint Patient presents with  Transitions Of Care Patient admitted to [facility] on 10/3/18 for DKA. 1. Have you been to the ER, urgent care clinic since your last visit? Hospitalized since your last visit? Yes When: 10/3/18 Where: [facility] Reason for visit: DKA 2. Have you seen or consulted any other health care providers outside of the [facility] since your last visit? Include any pap smears or colon screening.  No

## 2018-10-15 NOTE — PROGRESS NOTES
[address] [facility] 
[place] [address] 
[address] [address] Phone:  [phone]  Fax: [phone] Chief Complaint Patient presents with  Transitions Of Care Patient admitted to [facility] on 10/3/18 for DKA. .   
 
SUBJECTIVE: 
  Bal Da Silva is a 69 y.o. female comes in for return visit accompanied by her daughter. She has had two interventional calls since she was discharged. At each instance, it occurred at 5 or 6 am when she did not eat her full compliment of food at . I remind the patient and her daughter that she is indeed a type 1 diabetic. She does have a problem with her short-term memory. This has been going on for the last two years but it has gotten worse. She has a past history of primary hypertension and dyslipidemia. Current Outpatient Medications Medication Sig Dispense Refill  pravastatin (PRAVACHOL) 80 mg tablet Take 1 Tab by mouth daily. 90 Tab 3  
 levothyroxine (SYNTHROID) 150 mcg tablet TAKE 1 TABLET BY MOUTH EVERY DAY 30 Tab 11  
 amLODIPine (NORVASC) 10 mg tablet TAKE 1 TABLET BY MOUTH EVERY MORNING 90 Tab 3  clopidogrel (PLAVIX) 75 mg tab TAKE 1 TAB BY MOUTH DAILY. 30 Tab 11 Facility-Administered Medications Ordered in Other Visits Medication Dose Route Frequency Provider Last Rate Last Dose  heparin (porcine) injection 5,000 Units  5,000 Units SubCUTAneous Q8H Meka De Leon MD   5,000 Units at 10/21/18 1458  insulin glargine (LANTUS) injection 30 Units  30 Units SubCUTAneous DAILY Quincy Stevenson MD   30 Units at 10/21/18 1133  
 insulin lispro (HUMALOG) injection   SubCUTAneous AC&HS Stephen Brown MD   Stopped at 10/21/18 1130  
 glucose chewable tablet 16 g  4 Tab Oral PRN Quincy Stevenson MD      
 dextrose (D50W) injection syrg 12.5-25 g  12.5-25 g IntraVENous PRN Quincy Stevenson MD      
 glucagon (GLUCAGEN) injection 1 mg  1 mg IntraMUSCular PRN Stephen Brown MD      
  mupirocin (BACTROBAN) 2 % ointment   Both Nostrils BID Britney Quesada MD      
 influenza vaccine [id number] (6 mos+)(PF) (FLUARIX QUAD/FLULAVAL QUAD) injection 0.5 mL  0.5 mL IntraMUSCular PRIOR TO DISCHARGE Britney Quesada MD      
 dextrose 5% - 0.45% NaCl with KCl 20 mEq/L infusion  50 mL/hr IntraVENous CONTINUOUS HaJessica wright MD 50 mL/hr at 10/21/18 1106 50 mL/hr at 10/21/18 1106  sodium chloride (NS) flush 5-10 mL  5-10 mL IntraVENous PRN Josiane Morgan MD   30 mL at 10/20/18 6262  acetaminophen (TYLENOL) suppository 650 mg  650 mg Rectal Q4H PRN Chanel Hu MD      
 ondansetron [facility]) injection 4 mg  4 mg IntraVENous Q4H PRN Chanel Hu MD      
 levothyroxine (SYNTHROID) tablet 150 mcg  150 mcg Oral Tito Valencia MD   150 mcg at 10/21/18 2933 Past Medical History:  
Diagnosis Date  Diabetes ([facility]  Heart failure ([facility]   
 unknown to family  Hypercholesteremia  Hypertension  Stroke ([facility]  Thyroid disease Past Surgical History:  
Procedure Laterality Date  HX GYN    
 HX HEENT    
 thyroidectomy  HX HYSTERECTOMY  REMOVAL GALLBLADDER    
 THYROIDECTOMY No Known Allergies REVIEW OF SYSTEMS: 
General: negative for - chills or fever ENT: negative for - headaches, nasal congestion or tinnitus Respiratory: negative for - cough, hemoptysis, shortness of breath or wheezing Cardiovascular : negative for - chest pain, edema, palpitations or shortness of breath Gastrointestinal: negative for - abdominal pain, blood in stools, heartburn or nausea/vomiting Genito-Urinary: no dysuria, trouble voiding, or hematuria Musculoskeletal: negative for - gait disturbance, joint pain, joint stiffness or joint swelling Neurological: no TIA or stroke symptoms Hematologic: no bruises, no bleeding, no swollen glands Integument: no lumps, mole changes, nail changes or rash Endocrine: no malaise/lethargy or unexpected weight changes Social History Socioeconomic History  Marital status:  Spouse name: Not on file  Number of children: 1  Years of education: Not on file  Highest education level: Not on file Social Needs  Financial resource strain: Not on file  Food insecurity - worry: Not on file  Food insecurity - inability: Not on file  Transportation needs - medical: Not on file  Transportation needs - non-medical: Not on file Occupational History  Occupation: retired Tobacco Use  Smoking status: Never Smoker  Smokeless tobacco: Never Used Substance and Sexual Activity  Alcohol use: No  
  Comment: been years  Drug use: No  
 Sexual activity: Not Currently Other Topics Concern  Not on file Social History Narrative  Not on file Family History Problem Relation Age of Onset  Diabetes Father  No Known Problems Mother OBJECTIVE: 
 
Visit Vitals /64 Pulse 82 Temp 98.3 °F (36.8 °C) (Oral) Resp 16 Ht 5' 2.6\" (1.59 m) Wt 167 lb 12.8 oz (76.1 kg) SpO2 98% BMI 30.11 kg/m² CONSTITUTIONAL: well , well nourished, appears age appropriate EYES: perrla, eom intact ENMT:moist mucous membranes, pharynx clear NECK: supple. Thyroid normal 
RESPIRATORY: Chest: clear to ascultation and percussion CARDIOVASCULAR: Heart: regular rate and rhythm GASTROINTESTINAL: Abdomen: soft, bowel sounds active HEMATOLOGIC: no pathological lymph nodes palpated MUSCULOSKELETAL: Extremities: no edema, pulse 1+ INTEGUMENT: No unusual rashes or suspicious skin lesions noted. Nails appear normal. 
NEUROLOGIC: non-focal exam  
MENTAL STATUS: alert and oriented, appropriate affect ASSESSMENT: 
1. Hyperglycemia due to type 1 diabetes mellitus ([address] 2. Memory deficit 3. Essential hypertension 4. Dyslipidemia PLAN: 
 
1.  I remind the patient and her daughter of the importance of eating at the same time every day including a significant hs snack consisting of at least 250 calories. She has to have some form of protein at hs time. She will continue her Levimir at 30 mg qam.  Ideally, she needs prandial insulin but this is not practical in her current setting. 2. As far as her memory deficit is concerned, I suspect this might well represent neurovascular dementia and not Alzheimer's. I will try her on Aricept 5 mg qam.   
3. Her blood pressure is excellent today, no adjustments are made. 4. She will continue statin as prescribed. 5. I implore her to increase her medication compliance. Follow-up Disposition: 
Return in about 3 weeks (around 11/5/2018).  
 
 
David Davila MD

## 2018-10-18 ENCOUNTER — PATIENT OUTREACH (OUTPATIENT)
Dept: INTERNAL MEDICINE CLINIC | Age: 78
End: 2018-10-18

## 2018-10-19 ENCOUNTER — APPOINTMENT (OUTPATIENT)
Dept: CT IMAGING | Age: 78
DRG: 638 | End: 2018-10-19
Attending: EMERGENCY MEDICINE
Payer: MEDICARE

## 2018-10-19 ENCOUNTER — HOSPITAL ENCOUNTER (INPATIENT)
Age: 78
LOS: 4 days | Discharge: HOME HEALTH | DRG: 638 | End: 2018-10-23
Attending: EMERGENCY MEDICINE | Admitting: INTERNAL MEDICINE
Payer: MEDICARE

## 2018-10-19 ENCOUNTER — HOME CARE VISIT (OUTPATIENT)
Dept: SCHEDULING | Facility: HOME HEALTH | Age: 78
End: 2018-10-19

## 2018-10-19 ENCOUNTER — APPOINTMENT (OUTPATIENT)
Dept: GENERAL RADIOLOGY | Age: 78
DRG: 638 | End: 2018-10-19
Attending: EMERGENCY MEDICINE
Payer: MEDICARE

## 2018-10-19 DIAGNOSIS — G93.41 ACUTE METABOLIC ENCEPHALOPATHY: ICD-10-CM

## 2018-10-19 DIAGNOSIS — E87.1 HYPONATREMIA: ICD-10-CM

## 2018-10-19 DIAGNOSIS — E11.11 DIABETIC KETOACIDOSIS WITH COMA ASSOCIATED WITH TYPE 2 DIABETES MELLITUS (HCC): Primary | ICD-10-CM

## 2018-10-19 DIAGNOSIS — E87.29 HIGH ANION GAP METABOLIC ACIDOSIS: ICD-10-CM

## 2018-10-19 DIAGNOSIS — E87.20 LACTIC ACIDOSIS: ICD-10-CM

## 2018-10-19 DIAGNOSIS — N17.9 AKI (ACUTE KIDNEY INJURY) (HCC): ICD-10-CM

## 2018-10-19 DIAGNOSIS — E87.5 ACUTE HYPERKALEMIA: ICD-10-CM

## 2018-10-19 LAB
ALBUMIN SERPL-MCNC: 3.8 G/DL (ref 3.5–5)
ALBUMIN/GLOB SERPL: 1.1 {RATIO} (ref 1.1–2.2)
ALP SERPL-CCNC: 156 U/L (ref 45–117)
ALT SERPL-CCNC: 31 U/L (ref 12–78)
ANION GAP BLD CALC-SCNC: 30 MMOL/L (ref 10–20)
ANION GAP SERPL CALC-SCNC: 34 MMOL/L (ref 5–15)
AST SERPL-CCNC: 27 U/L (ref 15–37)
BASE DEFICIT BLDA-SCNC: 25.7 MMOL/L
BASOPHILS # BLD: 0 K/UL (ref 0–0.1)
BASOPHILS NFR BLD: 0 % (ref 0–1)
BDY SITE: ABNORMAL
BILIRUB SERPL-MCNC: 0.6 MG/DL (ref 0.2–1)
BREATHS.SPONTANEOUS ON VENT: 30
BUN BLD-MCNC: 44 MG/DL (ref 9–20)
BUN SERPL-MCNC: 41 MG/DL (ref 6–20)
BUN/CREAT SERPL: 21 (ref 12–20)
CA-I BLD-MCNC: 1.1 MMOL/L (ref 1.12–1.32)
CALCIUM SERPL-MCNC: 9.3 MG/DL (ref 8.5–10.1)
CHLORIDE BLD-SCNC: 94 MMOL/L (ref 98–107)
CHLORIDE SERPL-SCNC: 83 MMOL/L (ref 97–108)
CK MB CFR SERPL CALC: 3.3 % (ref 0–2.5)
CK MB SERPL-MCNC: 2 NG/ML (ref 5–25)
CK SERPL-CCNC: 60 U/L (ref 26–192)
CO2 BLD-SCNC: 8 MMOL/L (ref 21–32)
CO2 SERPL-SCNC: 5 MMOL/L (ref 21–32)
COMMENT, HOLDF: NORMAL
CREAT BLD-MCNC: 1.4 MG/DL (ref 0.6–1.3)
CREAT SERPL-MCNC: 1.99 MG/DL (ref 0.55–1.02)
DIFFERENTIAL METHOD BLD: ABNORMAL
EOSINOPHIL # BLD: 0 K/UL (ref 0–0.4)
EOSINOPHIL NFR BLD: 0 % (ref 0–7)
ERYTHROCYTE [DISTWIDTH] IN BLOOD BY AUTOMATED COUNT: 14 % (ref 11.5–14.5)
GLOBULIN SER CALC-MCNC: 3.5 G/DL (ref 2–4)
GLUCOSE BLD STRIP.AUTO-MCNC: >600 MG/DL (ref 65–100)
GLUCOSE BLD-MCNC: >700 MG/DL (ref 65–100)
GLUCOSE SERPL-MCNC: 1239 MG/DL (ref 65–100)
HCO3 BLDA-SCNC: 4 MMOL/L (ref 22–26)
HCT VFR BLD AUTO: 38 % (ref 35–47)
HCT VFR BLD CALC: 34 % (ref 35–47)
HGB BLD-MCNC: 11.3 G/DL (ref 11.5–16)
IMM GRANULOCYTES # BLD: 0 K/UL (ref 0–0.04)
IMM GRANULOCYTES NFR BLD AUTO: 0 % (ref 0–0.5)
LACTATE SERPL-SCNC: 10.1 MMOL/L (ref 0.4–2)
LYMPHOCYTES # BLD: 1.1 K/UL (ref 0.8–3.5)
LYMPHOCYTES NFR BLD: 6 % (ref 12–49)
MAGNESIUM SERPL-MCNC: 2.7 MG/DL (ref 1.6–2.4)
MCH RBC QN AUTO: 31.5 PG (ref 26–34)
MCHC RBC AUTO-ENTMCNC: 29.7 G/DL (ref 30–36.5)
MCV RBC AUTO: 105.8 FL (ref 80–99)
MONOCYTES # BLD: 2 K/UL (ref 0–1)
MONOCYTES NFR BLD: 11 % (ref 5–13)
MYELOCYTES NFR BLD MANUAL: 3 %
NEUTS BAND NFR BLD MANUAL: 1 %
NEUTS SEG # BLD: 14.6 K/UL (ref 1.8–8)
NEUTS SEG NFR BLD: 79 % (ref 32–75)
NRBC # BLD: 0 K/UL (ref 0–0.01)
NRBC BLD-RTO: 0 PER 100 WBC
PCO2 BLDA: 17 MMHG (ref 35–45)
PH BLDA: 7 [PH] (ref 7.35–7.45)
PLATELET # BLD AUTO: 357 K/UL (ref 150–400)
PMV BLD AUTO: 10.5 FL (ref 8.9–12.9)
PO2 BLDA: 107 MMHG (ref 80–100)
POTASSIUM BLD-SCNC: 7 MMOL/L (ref 3.5–5.1)
POTASSIUM SERPL-SCNC: 7 MMOL/L (ref 3.5–5.1)
PROT SERPL-MCNC: 7.3 G/DL (ref 6.4–8.2)
RBC # BLD AUTO: 3.59 M/UL (ref 3.8–5.2)
RBC MORPH BLD: ABNORMAL
SAMPLES BEING HELD,HOLD: NORMAL
SAO2 % BLD: 95 % (ref 92–97)
SAO2% DEVICE SAO2% SENSOR NAME: ABNORMAL
SERVICE CMNT-IMP: ABNORMAL
SODIUM BLD-SCNC: 123 MMOL/L (ref 136–145)
SODIUM SERPL-SCNC: 122 MMOL/L (ref 136–145)
SPECIMEN SITE: ABNORMAL
WBC # BLD AUTO: 18.3 K/UL (ref 3.6–11)

## 2018-10-19 PROCEDURE — 51702 INSERT TEMP BLADDER CATH: CPT

## 2018-10-19 PROCEDURE — 74011250636 HC RX REV CODE- 250/636: Performed by: EMERGENCY MEDICINE

## 2018-10-19 PROCEDURE — 83605 ASSAY OF LACTIC ACID: CPT | Performed by: EMERGENCY MEDICINE

## 2018-10-19 PROCEDURE — 82550 ASSAY OF CK (CPK): CPT | Performed by: EMERGENCY MEDICINE

## 2018-10-19 PROCEDURE — 83735 ASSAY OF MAGNESIUM: CPT | Performed by: EMERGENCY MEDICINE

## 2018-10-19 PROCEDURE — 96375 TX/PRO/DX INJ NEW DRUG ADDON: CPT

## 2018-10-19 PROCEDURE — 74011000258 HC RX REV CODE- 258: Performed by: EMERGENCY MEDICINE

## 2018-10-19 PROCEDURE — 74011636637 HC RX REV CODE- 636/637: Performed by: EMERGENCY MEDICINE

## 2018-10-19 PROCEDURE — 80047 BASIC METABLC PNL IONIZED CA: CPT

## 2018-10-19 PROCEDURE — 80053 COMPREHEN METABOLIC PANEL: CPT | Performed by: EMERGENCY MEDICINE

## 2018-10-19 PROCEDURE — 81001 URINALYSIS AUTO W/SCOPE: CPT | Performed by: EMERGENCY MEDICINE

## 2018-10-19 PROCEDURE — 96366 THER/PROPH/DIAG IV INF ADDON: CPT

## 2018-10-19 PROCEDURE — 71045 X-RAY EXAM CHEST 1 VIEW: CPT

## 2018-10-19 PROCEDURE — 85025 COMPLETE CBC W/AUTO DIFF WBC: CPT | Performed by: EMERGENCY MEDICINE

## 2018-10-19 PROCEDURE — 65610000006 HC RM INTENSIVE CARE

## 2018-10-19 PROCEDURE — 93005 ELECTROCARDIOGRAM TRACING: CPT

## 2018-10-19 PROCEDURE — 36415 COLL VENOUS BLD VENIPUNCTURE: CPT | Performed by: EMERGENCY MEDICINE

## 2018-10-19 PROCEDURE — 99285 EMERGENCY DEPT VISIT HI MDM: CPT

## 2018-10-19 PROCEDURE — 77030005514 HC CATH URETH FOL14 BARD -A

## 2018-10-19 PROCEDURE — 82962 GLUCOSE BLOOD TEST: CPT

## 2018-10-19 PROCEDURE — 96365 THER/PROPH/DIAG IV INF INIT: CPT

## 2018-10-19 PROCEDURE — 70450 CT HEAD/BRAIN W/O DYE: CPT

## 2018-10-19 PROCEDURE — 82803 BLOOD GASES ANY COMBINATION: CPT | Performed by: EMERGENCY MEDICINE

## 2018-10-19 PROCEDURE — 74011000250 HC RX REV CODE- 250: Performed by: EMERGENCY MEDICINE

## 2018-10-19 PROCEDURE — 74011250637 HC RX REV CODE- 250/637: Performed by: EMERGENCY MEDICINE

## 2018-10-19 PROCEDURE — G0299 HHS/HOSPICE OF RN EA 15 MIN: HCPCS

## 2018-10-19 RX ORDER — SODIUM BICARBONATE 1 MEQ/ML
50 SYRINGE (ML) INTRAVENOUS
Status: COMPLETED | OUTPATIENT
Start: 2018-10-19 | End: 2018-10-19

## 2018-10-19 RX ORDER — CALCIUM GLUCONATE 94 MG/ML
1 INJECTION, SOLUTION INTRAVENOUS
Status: COMPLETED | OUTPATIENT
Start: 2018-10-19 | End: 2018-10-19

## 2018-10-19 RX ORDER — ONDANSETRON 2 MG/ML
4 INJECTION INTRAMUSCULAR; INTRAVENOUS
Status: DISCONTINUED | OUTPATIENT
Start: 2018-10-19 | End: 2018-10-23 | Stop reason: HOSPADM

## 2018-10-19 RX ORDER — SODIUM CHLORIDE 0.9 % (FLUSH) 0.9 %
5-10 SYRINGE (ML) INJECTION AS NEEDED
Status: DISCONTINUED | OUTPATIENT
Start: 2018-10-19 | End: 2018-10-23 | Stop reason: HOSPADM

## 2018-10-19 RX ORDER — LEVOTHYROXINE SODIUM 150 UG/1
150 TABLET ORAL
Status: DISCONTINUED | OUTPATIENT
Start: 2018-10-20 | End: 2018-10-23 | Stop reason: HOSPADM

## 2018-10-19 RX ORDER — HEPARIN SODIUM 5000 [USP'U]/ML
5000 INJECTION, SOLUTION INTRAVENOUS; SUBCUTANEOUS EVERY 12 HOURS
Status: DISCONTINUED | OUTPATIENT
Start: 2018-10-19 | End: 2018-10-21

## 2018-10-19 RX ORDER — ACETAMINOPHEN 650 MG/1
650 SUPPOSITORY RECTAL
Status: DISCONTINUED | OUTPATIENT
Start: 2018-10-19 | End: 2018-10-23 | Stop reason: HOSPADM

## 2018-10-19 RX ORDER — SODIUM BICARBONATE IN D5W 150/1000ML
PLASTIC BAG, INJECTION (ML) INTRAVENOUS CONTINUOUS
Status: DISCONTINUED | OUTPATIENT
Start: 2018-10-19 | End: 2018-10-20

## 2018-10-19 RX ORDER — DEXTROSE 50 % IN WATER (D50W) INTRAVENOUS SYRINGE
25-50 AS NEEDED
Status: DISCONTINUED | OUTPATIENT
Start: 2018-10-19 | End: 2018-10-21

## 2018-10-19 RX ADMIN — SODIUM CHLORIDE 1000 ML: 900 INJECTION, SOLUTION INTRAVENOUS at 21:38

## 2018-10-19 RX ADMIN — SODIUM CHLORIDE 1000 ML: 900 INJECTION, SOLUTION INTRAVENOUS at 23:33

## 2018-10-19 RX ADMIN — SODIUM CHLORIDE 10.8 UNITS/HR: 900 INJECTION, SOLUTION INTRAVENOUS at 22:02

## 2018-10-19 RX ADMIN — CALCIUM GLUCONATE 1 G: 98 INJECTION, SOLUTION INTRAVENOUS at 23:22

## 2018-10-19 RX ADMIN — INSULIN HUMAN 10 UNITS: 100 INJECTION, SOLUTION PARENTERAL at 21:35

## 2018-10-19 RX ADMIN — SODIUM BICARBONATE 50 MEQ: 84 INJECTION, SOLUTION INTRAVENOUS at 23:22

## 2018-10-19 RX ADMIN — Medication: at 23:31

## 2018-10-20 PROBLEM — G93.41 ACUTE METABOLIC ENCEPHALOPATHY: Status: ACTIVE | Noted: 2018-10-20

## 2018-10-20 PROBLEM — F01.50 VASCULAR DEMENTIA WITHOUT BEHAVIORAL DISTURBANCE (HCC): Status: ACTIVE | Noted: 2018-10-20

## 2018-10-20 PROBLEM — E87.0 HYPEROSMOLAR HYPONATREMIA: Status: ACTIVE | Noted: 2018-10-20

## 2018-10-20 PROBLEM — E87.20 LACTIC ACIDOSIS: Status: ACTIVE | Noted: 2018-10-20

## 2018-10-20 PROBLEM — E87.1 HYPEROSMOLAR HYPONATREMIA: Status: ACTIVE | Noted: 2018-10-20

## 2018-10-20 PROBLEM — E87.5 HYPERKALEMIA: Status: ACTIVE | Noted: 2018-10-20

## 2018-10-20 PROBLEM — N17.9 AKI (ACUTE KIDNEY INJURY) (HCC): Status: ACTIVE | Noted: 2018-10-20

## 2018-10-20 LAB
ANION GAP SERPL CALC-SCNC: 11 MMOL/L (ref 5–15)
ANION GAP SERPL CALC-SCNC: 23 MMOL/L (ref 5–15)
ANION GAP SERPL CALC-SCNC: 8 MMOL/L (ref 5–15)
APPEARANCE UR: CLEAR
ATRIAL RATE: 215 BPM
BACTERIA URNS QL MICRO: NEGATIVE /HPF
BILIRUB UR QL CFM: NEGATIVE
BILIRUB UR QL: NEGATIVE
BUN SERPL-MCNC: 27 MG/DL (ref 6–20)
BUN SERPL-MCNC: 29 MG/DL (ref 6–20)
BUN SERPL-MCNC: 34 MG/DL (ref 6–20)
BUN/CREAT SERPL: 19 (ref 12–20)
BUN/CREAT SERPL: 25 (ref 12–20)
BUN/CREAT SERPL: 26 (ref 12–20)
CALCIUM SERPL-MCNC: 8 MG/DL (ref 8.5–10.1)
CALCIUM SERPL-MCNC: 8.7 MG/DL (ref 8.5–10.1)
CALCIUM SERPL-MCNC: 8.8 MG/DL (ref 8.5–10.1)
CALCULATED P AXIS, ECG09: 73 DEGREES
CALCULATED R AXIS, ECG10: -65 DEGREES
CALCULATED T AXIS, ECG11: -18 DEGREES
CHLORIDE SERPL-SCNC: 101 MMOL/L (ref 97–108)
CHLORIDE SERPL-SCNC: 101 MMOL/L (ref 97–108)
CHLORIDE SERPL-SCNC: 107 MMOL/L (ref 97–108)
CO2 SERPL-SCNC: 13 MMOL/L (ref 21–32)
CO2 SERPL-SCNC: 27 MMOL/L (ref 21–32)
CO2 SERPL-SCNC: 29 MMOL/L (ref 21–32)
COLOR UR: ABNORMAL
CREAT SERPL-MCNC: 1.04 MG/DL (ref 0.55–1.02)
CREAT SERPL-MCNC: 1.15 MG/DL (ref 0.55–1.02)
CREAT SERPL-MCNC: 1.78 MG/DL (ref 0.55–1.02)
DIAGNOSIS, 93000: NORMAL
EPITH CASTS URNS QL MICRO: ABNORMAL /LPF
EST. AVERAGE GLUCOSE BLD GHB EST-MCNC: 249 MG/DL
GLUCOSE BLD STRIP.AUTO-MCNC: 113 MG/DL (ref 65–100)
GLUCOSE BLD STRIP.AUTO-MCNC: 114 MG/DL (ref 65–100)
GLUCOSE BLD STRIP.AUTO-MCNC: 114 MG/DL (ref 65–100)
GLUCOSE BLD STRIP.AUTO-MCNC: 115 MG/DL (ref 65–100)
GLUCOSE BLD STRIP.AUTO-MCNC: 121 MG/DL (ref 65–100)
GLUCOSE BLD STRIP.AUTO-MCNC: 147 MG/DL (ref 65–100)
GLUCOSE BLD STRIP.AUTO-MCNC: 181 MG/DL (ref 65–100)
GLUCOSE BLD STRIP.AUTO-MCNC: 184 MG/DL (ref 65–100)
GLUCOSE BLD STRIP.AUTO-MCNC: 189 MG/DL (ref 65–100)
GLUCOSE BLD STRIP.AUTO-MCNC: 222 MG/DL (ref 65–100)
GLUCOSE BLD STRIP.AUTO-MCNC: 241 MG/DL (ref 65–100)
GLUCOSE BLD STRIP.AUTO-MCNC: 245 MG/DL (ref 65–100)
GLUCOSE BLD STRIP.AUTO-MCNC: 302 MG/DL (ref 65–100)
GLUCOSE BLD STRIP.AUTO-MCNC: 326 MG/DL (ref 65–100)
GLUCOSE BLD STRIP.AUTO-MCNC: 361 MG/DL (ref 65–100)
GLUCOSE BLD STRIP.AUTO-MCNC: 425 MG/DL (ref 65–100)
GLUCOSE BLD STRIP.AUTO-MCNC: 521 MG/DL (ref 65–100)
GLUCOSE BLD STRIP.AUTO-MCNC: 587 MG/DL (ref 65–100)
GLUCOSE BLD STRIP.AUTO-MCNC: 72 MG/DL (ref 65–100)
GLUCOSE BLD STRIP.AUTO-MCNC: 85 MG/DL (ref 65–100)
GLUCOSE BLD STRIP.AUTO-MCNC: 94 MG/DL (ref 65–100)
GLUCOSE BLD STRIP.AUTO-MCNC: >600 MG/DL (ref 65–100)
GLUCOSE SERPL-MCNC: 363 MG/DL (ref 65–100)
GLUCOSE SERPL-MCNC: 532 MG/DL (ref 65–100)
GLUCOSE SERPL-MCNC: 81 MG/DL (ref 65–100)
GLUCOSE UR STRIP.AUTO-MCNC: >1000 MG/DL
HBA1C MFR BLD: 10.3 % (ref 4.2–6.3)
HGB UR QL STRIP: NEGATIVE
HYALINE CASTS URNS QL MICRO: ABNORMAL /LPF (ref 0–5)
KETONES UR QL STRIP.AUTO: 80 MG/DL
LACTATE SERPL-SCNC: 1.6 MMOL/L (ref 0.4–2)
LACTATE SERPL-SCNC: 10 MMOL/L (ref 0.4–2)
LACTATE SERPL-SCNC: 2.9 MMOL/L (ref 0.4–2)
LACTATE SERPL-SCNC: 8.1 MMOL/L (ref 0.4–2)
LEUKOCYTE ESTERASE UR QL STRIP.AUTO: NEGATIVE
MAGNESIUM SERPL-MCNC: 2.1 MG/DL (ref 1.6–2.4)
MAGNESIUM SERPL-MCNC: 2.4 MG/DL (ref 1.6–2.4)
NITRITE UR QL STRIP.AUTO: NEGATIVE
PH UR STRIP: 5 [PH] (ref 5–8)
PHOSPHATE SERPL-MCNC: 1.6 MG/DL (ref 2.6–4.7)
PHOSPHATE SERPL-MCNC: 1.9 MG/DL (ref 2.6–4.7)
POTASSIUM SERPL-SCNC: 3.4 MMOL/L (ref 3.5–5.1)
POTASSIUM SERPL-SCNC: 3.8 MMOL/L (ref 3.5–5.1)
POTASSIUM SERPL-SCNC: 4.4 MMOL/L (ref 3.5–5.1)
PROT UR STRIP-MCNC: NEGATIVE MG/DL
Q-T INTERVAL, ECG07: 222 MS
QRS DURATION, ECG06: 164 MS
QTC CALCULATION (BEZET), ECG08: 419 MS
RBC #/AREA URNS HPF: ABNORMAL /HPF (ref 0–5)
SERVICE CMNT-IMP: ABNORMAL
SERVICE CMNT-IMP: NORMAL
SODIUM SERPL-SCNC: 137 MMOL/L (ref 136–145)
SODIUM SERPL-SCNC: 139 MMOL/L (ref 136–145)
SODIUM SERPL-SCNC: 144 MMOL/L (ref 136–145)
SP GR UR REFRACTOMETRY: 1.03 (ref 1–1.03)
UA: UC IF INDICATED,UAUC: ABNORMAL
UROBILINOGEN UR QL STRIP.AUTO: 0.2 EU/DL (ref 0.2–1)
VENTRICULAR RATE, ECG03: 215 BPM
WBC URNS QL MICRO: ABNORMAL /HPF (ref 0–4)

## 2018-10-20 PROCEDURE — 83605 ASSAY OF LACTIC ACID: CPT | Performed by: INTERNAL MEDICINE

## 2018-10-20 PROCEDURE — 84100 ASSAY OF PHOSPHORUS: CPT | Performed by: INTERNAL MEDICINE

## 2018-10-20 PROCEDURE — 83735 ASSAY OF MAGNESIUM: CPT | Performed by: INTERNAL MEDICINE

## 2018-10-20 PROCEDURE — 80048 BASIC METABOLIC PNL TOTAL CA: CPT | Performed by: INTERNAL MEDICINE

## 2018-10-20 PROCEDURE — 74011250636 HC RX REV CODE- 250/636: Performed by: INTERNAL MEDICINE

## 2018-10-20 PROCEDURE — 74011250637 HC RX REV CODE- 250/637: Performed by: INTERNAL MEDICINE

## 2018-10-20 PROCEDURE — 74011000258 HC RX REV CODE- 258: Performed by: EMERGENCY MEDICINE

## 2018-10-20 PROCEDURE — 74011000250 HC RX REV CODE- 250: Performed by: INTERNAL MEDICINE

## 2018-10-20 PROCEDURE — 82962 GLUCOSE BLOOD TEST: CPT

## 2018-10-20 PROCEDURE — 74011636637 HC RX REV CODE- 636/637: Performed by: EMERGENCY MEDICINE

## 2018-10-20 PROCEDURE — 65610000006 HC RM INTENSIVE CARE

## 2018-10-20 PROCEDURE — 74011250637 HC RX REV CODE- 250/637: Performed by: EMERGENCY MEDICINE

## 2018-10-20 PROCEDURE — 83036 HEMOGLOBIN GLYCOSYLATED A1C: CPT | Performed by: INTERNAL MEDICINE

## 2018-10-20 PROCEDURE — 36415 COLL VENOUS BLD VENIPUNCTURE: CPT | Performed by: INTERNAL MEDICINE

## 2018-10-20 PROCEDURE — 74011000250 HC RX REV CODE- 250: Performed by: EMERGENCY MEDICINE

## 2018-10-20 RX ORDER — MUPIROCIN 20 MG/G
OINTMENT TOPICAL 2 TIMES DAILY
Status: DISCONTINUED | OUTPATIENT
Start: 2018-10-20 | End: 2018-10-23 | Stop reason: HOSPADM

## 2018-10-20 RX ORDER — DEXTROSE, SODIUM CHLORIDE, AND POTASSIUM CHLORIDE 5; .45; .15 G/100ML; G/100ML; G/100ML
25 INJECTION INTRAVENOUS CONTINUOUS
Status: DISCONTINUED | OUTPATIENT
Start: 2018-10-20 | End: 2018-10-22

## 2018-10-20 RX ORDER — POTASSIUM CHLORIDE AND SODIUM CHLORIDE 450; 150 MG/100ML; MG/100ML
INJECTION, SOLUTION INTRAVENOUS CONTINUOUS
Status: DISCONTINUED | OUTPATIENT
Start: 2018-10-20 | End: 2018-10-20

## 2018-10-20 RX ADMIN — SODIUM CHLORIDE 11.3 UNITS/HR: 900 INJECTION, SOLUTION INTRAVENOUS at 06:57

## 2018-10-20 RX ADMIN — MUPIROCIN: 20 OINTMENT TOPICAL at 10:26

## 2018-10-20 RX ADMIN — Medication 30 ML: at 09:42

## 2018-10-20 RX ADMIN — Medication: at 10:22

## 2018-10-20 RX ADMIN — MUPIROCIN: 20 OINTMENT TOPICAL at 17:33

## 2018-10-20 RX ADMIN — LEVOTHYROXINE SODIUM 150 MCG: 150 TABLET ORAL at 06:57

## 2018-10-20 RX ADMIN — HEPARIN SODIUM 5000 UNITS: 5000 INJECTION INTRAVENOUS; SUBCUTANEOUS at 02:37

## 2018-10-20 RX ADMIN — SODIUM CHLORIDE 27.7 UNITS/HR: 900 INJECTION, SOLUTION INTRAVENOUS at 03:35

## 2018-10-20 RX ADMIN — POTASSIUM PHOSPHATE, MONOBASIC AND POTASSIUM PHOSPHATE, DIBASIC: 224; 236 INJECTION, SOLUTION INTRAVENOUS at 13:22

## 2018-10-20 RX ADMIN — FAMOTIDINE 20 MG: 10 INJECTION, SOLUTION INTRAVENOUS at 11:12

## 2018-10-20 RX ADMIN — DEXTROSE MONOHYDRATE, SODIUM CHLORIDE, AND POTASSIUM CHLORIDE 125 ML/HR: 50; 4.5; 1.49 INJECTION, SOLUTION INTRAVENOUS at 15:00

## 2018-10-20 RX ADMIN — HEPARIN SODIUM 5000 UNITS: 5000 INJECTION INTRAVENOUS; SUBCUTANEOUS at 11:12

## 2018-10-20 RX ADMIN — DEXTROSE MONOHYDRATE 5.5 G: 25 INJECTION, SOLUTION INTRAVENOUS at 11:10

## 2018-10-20 NOTE — PROGRESS NOTES
Problem: Falls - Risk of 
Goal: *Absence of Falls Document Melany Mcmillans Fall Risk and appropriate interventions in the flowsheet. Outcome: Progressing Towards Goal 
Fall Risk Interventions: 
  
 
  
 
Medication Interventions: Bed/chair exit alarm, Patient to call before getting OOB, Teach patient to arise slowly, Evaluate medications/consider consulting pharmacy Elimination Interventions: Bed/chair exit alarm, Call light in reach, Toileting schedule/hourly rounds, Patient to call for help with toileting needs History of Falls Interventions: Bed/chair exit alarm Problem: Pressure Injury - Risk of 
Goal: *Prevention of pressure injury Document Madhu Scale and appropriate interventions in the flowsheet. Outcome: Progressing Towards Goal 
Pressure Injury Interventions: Activity Interventions: Assess need for specialty bed, Increase time out of bed, Pressure redistribution bed/mattress(bed type), PT/OT evaluation Mobility Interventions: Assess need for specialty bed, HOB 30 degrees or less, Pressure redistribution bed/mattress (bed type), PT/OT evaluation, Turn and reposition approx. every two hours(pillow and wedges) Nutrition Interventions: Document food/fluid/supplement intake, Discuss nutritional consult with provider, Offer support with meals,snacks and hydration

## 2018-10-20 NOTE — CONSULTS
[facility] Consult Note        NAME: Serenity Yoon       :  1940       MRN:  [id number]     Date/Time: 10/20/2018    Risk of deterioration: medium       Assessment:    Plan:  DKA  ONEYDA  Hyperkalemia-  Hypophosphatemia Change bicarb gtt ot 1/2 ns with kcl  Gap closing  Replete phos  ONEYDA resolving  DTC, would benefit from an endocrinologist if she doesn't have one  WIll see again upon request.   Asked to see for ONEYDA/hyper kalemia-pt admitted with dka, sugar > 1220-k 7-all normalizing with treatment of DKA. Subjective:     Chief Complaint:  My sugar was how high? Review of Systems: no n/v/cp/sob    Objective:     VITALS:   Last 24hrs VS reviewed since prior progress note. Most recent are:  Visit Vitals  /41   Pulse 83   Temp 98.6 °F (37 °C)   Resp 17   Ht 5' 2\" (1.575 m)   Wt 77.7 kg (171 lb 4.8 oz)   SpO2 99%   BMI 31.33 kg/m²     SpO2 Readings from Last 6 Encounters:   10/20/18 99%   10/15/18 98%   10/09/18 100%   18 100%   18 100%   18 100%            Intake/Output Summary (Last 24 hours) at 10/20/2018 1225  Last data filed at 10/20/2018 0800  Gross per 24 hour   Intake 547.89 ml   Output 2550 ml   Net -2002. 11 ml        PHYSICAL EXAM:    General   well developed, well nourished, appears stated age, in no acute distress  Respiratory   Clear anteriorly  Cardiology  RRR  Extremities  No clubbing, cyanosis, or edema. Pulses intact.               Lab Data Reviewed: (see below)    Medications Reviewed: (see below)    PMH/SH reviewed - no change compared to H&P  ___________________________________________________    ___________________________________________________    Attending Physician: Farida Pruitt MD     ____________________________________________________  MEDICATIONS:  Current Facility-Administered Medications   Medication Dose Route Frequency    mupirocin (BACTROBAN) 2 % ointment   Both Nostrils BID    influenza vaccine  (6 mos+)(PF) (FLUARIX QUAD/FLULAVAL QUAD) injection 0.5 mL  0.5 mL IntraMUSCular PRIOR TO DISCHARGE    famotidine (PF) (PEPCID) 20 mg in sodium chloride 0.9% 10 mL injection  20 mg IntraVENous Q12H    0.45% sodium chloride with KCl 20 mEq/L infusion   IntraVENous CONTINUOUS    potassium phosphate 15 mmol in 0.9% sodium chloride 250 mL infusion   IntraVENous ONCE    sodium chloride (NS) flush 5-10 mL  5-10 mL IntraVENous PRN    insulin regular (NOVOLIN R, HUMULIN R) 100 Units in 0.9% sodium chloride 100 mL infusion  0-50 Units/hr IntraVENous TITRATE    dextrose (D50W) injection syrg 12.5-25 g  25-50 mL IntraVENous PRN    acetaminophen (TYLENOL) suppository 650 mg  650 mg Rectal Q4H PRN    ondansetron (ZOFRAN) injection 4 mg  4 mg IntraVENous Q4H PRN    heparin (porcine) injection 5,000 Units  5,000 Units SubCUTAneous Q12H    levothyroxine (SYNTHROID) tablet 150 mcg  150 mcg Oral 6am        LABS:  Recent Labs     10/19/18  2056   WBC 18.3*   HGB 11.3*   HCT 38.0        Recent Labs     10/20/18  0934 10/20/18  0246 10/19/18  2056    137 122*   K 3.4* 3.8 7.0*    101 83*   CO2 29 13* 5*   BUN 29* 34* 41*   CREA 1.15* 1.78* 1.99*   GLU 81 532* 1,239*   CA 8.8 8.7 9.3   MG 2.1 2.4 2.7*   PHOS 1.6* 1.9*  --      Recent Labs     10/19/18  2056   SGOT 27   ALT 31   *   TBILI 0.6   TP 7.3   ALB 3.8   GLOB 3.5     No results for input(s): INR, PTP, APTT in the last 72 hours. No lab exists for component: INREXT   No results for input(s): FE, TIBC, PSAT, FERR in the last 72 hours.    Recent Labs     10/19/18  2146   PH 7.00*   PCO2 17*   PO2 107*     Recent Labs     10/19/18  2056   CPK 60   CKNDX 3.3*     Lab Results   Component Value Date/Time    Glucose (POC) 94 10/20/2018 11:22 AM    Glucose (POC) 72 10/20/2018 11:00 AM    Glucose (POC) 85 10/20/2018 09:59 AM    Glucose (POC) 115 (H) 10/20/2018 09:04 AM    Glucose (POC) 184 (H) 10/20/2018 07:58 AM

## 2018-10-20 NOTE — ED PROVIDER NOTES
EMERGENCY DEPARTMENT HISTORY AND PHYSICAL EXAM 
 
 
Date: 10/19/2018 Patient Name: Alpa Pickett History of Presenting Illness Chief Complaint Patient presents with  
 High Blood Sugar  
  EMS Bld Glu of \"Hi\" X 2, End Gentry CO2  of 13 with EMS, increased Resp and HR. History Provided By: EMS and family HPI: Alpa Pickett, 69 y.o. female with PMHx significant for DM, HTN, TIA, presents via EMS to the ED with cc of AMS and high blood sugar. Pt noticed to have blood glucose of \"Hi\" x2 and end title CO2 of 13 with good wave. EMS reports pt also tachycardic in the 120's and increased respirations. Family reports pt has had insulin today. Additionally, pt specifically denies any recent fever, chills, headache, nausea, vomiting, diarrhea, abdominal pain, CP, SOB, lightheadedness, dizziness, numbness, weakness, tingling, BLE swelling, heart palpitations, urinary sxs, changes in BM, changes in PO intake, melena, hematochezia, cough, or congestion. History is limited due to patient's altered mental status PCP: Spencer Morales MD 
 
There are no other complaints, changes or physical findings at this time. Past History Past Medical History: 
Past Medical History:  
Diagnosis Date  Diabetes ([address]  Heart failure ([address]   
 unknown to family  Hypercholesteremia  Hypertension  Stroke ([facility]  Thyroid disease Past Surgical History: 
Past Surgical History:  
Procedure Laterality Date  HX GYN    
 HX HEENT    
 thyroidectomy  HX HYSTERECTOMY  REMOVAL GALLBLADDER    
 THYROIDECTOMY Family History: 
Family History Problem Relation Age of Onset  Diabetes Father  No Known Problems Mother Social History: 
Social History Tobacco Use  Smoking status: Never Smoker  Smokeless tobacco: Never Used Substance Use Topics  Alcohol use: No  
  Comment: been years  Drug use: No  
 
 
Allergies: 
No Known Allergies Review of Systems Review of Systems Unable to perform ROS: Acuity of condition Constitutional: Negative. Negative for chills and fever. HENT: Negative. Negative for congestion, facial swelling, rhinorrhea, sore throat, trouble swallowing and voice change. Eyes: Negative. Respiratory: Negative. Negative for apnea, cough, chest tightness, shortness of breath and wheezing. Cardiovascular: Negative. Negative for chest pain, palpitations and leg swelling. Gastrointestinal: Negative. Negative for abdominal distention, abdominal pain, blood in stool, constipation, diarrhea, nausea and vomiting. Endocrine: Negative. Negative for cold intolerance, heat intolerance and polyuria. Genitourinary: Negative. Negative for difficulty urinating, dysuria, flank pain, frequency, hematuria and urgency. Musculoskeletal: Negative. Negative for arthralgias, back pain, myalgias, neck pain and neck stiffness. Skin: Negative. Negative for color change and rash. Neurological: Negative. Negative for dizziness, syncope, facial asymmetry, speech difficulty, weakness, light-headedness, numbness and headaches. Hematological: Negative. Does not bruise/bleed easily. Psychiatric/Behavioral: Negative. Negative for confusion and self-injury. The patient is not nervous/anxious. Physical Exam  
Physical Exam  
Constitutional: She appears lethargic. She appears toxic. She has a sickly appearance. She appears ill. She appears distressed. HENT:  
Head: Normocephalic and atraumatic. Mouth/Throat: No oropharyngeal exudate. Dry mucous membranes Eyes: Conjunctivae and EOM are normal. Pupils are equal, round, and reactive to light. Neck: Normal range of motion. Cardiovascular: Regular rhythm and normal heart sounds. Tachycardia present. Exam reveals no gallop and no friction rub. No murmur heard. Pulmonary/Chest: Breath sounds normal. She is in respiratory distress.  She has no wheezes. She has no rales. She exhibits no tenderness. Tachypneic, RR's 40's Abdominal: Soft. Bowel sounds are normal. She exhibits no distension and no mass. There is no tenderness. There is no rebound and no guarding. Musculoskeletal: Normal range of motion. She exhibits no edema, tenderness or deformity. Neurological: She appears lethargic. She displays normal reflexes. No cranial nerve deficit. She exhibits normal muscle tone. Coordination normal. GCS eye subscore is 3. GCS verbal subscore is 3. GCS motor subscore is 4. Skin: Skin is dry. No rash noted. She is not diaphoretic. Psychiatric: She has a normal mood and affect. Nursing note and vitals reviewed. Diagnostic Study Results Labs - Recent Results (from the past 12 hour(s)) CBC WITH AUTOMATED DIFF Collection Time: 10/19/18  8:56 PM  
Result Value Ref Range WBC 18.3 (H) 3.6 - 11.0 K/uL  
 RBC 3.59 (L) 3.80 - 5.20 M/uL  
 HGB 11.3 (L) 11.5 - 16.0 g/dL HCT 38.0 35.0 - 47.0 % .8 (H) 80.0 - 99.0 FL  
 MCH 31.5 26.0 - 34.0 PG  
 MCHC 29.7 (L) 30.0 - 36.5 g/dL  
 RDW 14.0 11.5 - 14.5 % PLATELET 855 939 - 027 K/uL MPV 10.5 8.9 - 12.9 FL  
 NRBC 0.0 0  WBC ABSOLUTE NRBC 0.00 0.00 - 0.01 K/uL NEUTROPHILS 79 (H) 32 - 75 % BAND NEUTROPHILS 1 % LYMPHOCYTES 6 (L) 12 - 49 % MONOCYTES 11 5 - 13 % EOSINOPHILS 0 0 - 7 % BASOPHILS 0 0 - 1 % MYELOCYTES 3 % IMMATURE GRANULOCYTES 0 0.0 - 0.5 % ABS. NEUTROPHILS 14.6 (H) 1.8 - 8.0 K/UL  
 ABS. LYMPHOCYTES 1.1 0.8 - 3.5 K/UL  
 ABS. MONOCYTES 2.0 (H) 0.0 - 1.0 K/UL  
 ABS. EOSINOPHILS 0.0 0.0 - 0.4 K/UL  
 ABS. BASOPHILS 0.0 0.0 - 0.1 K/UL  
 ABS. IMM. GRANS. 0.0 0.00 - 0.04 K/UL  
 DF MANUAL    
 RBC COMMENTS MACROCYTOSIS 
1+ 
    
CK W/ CKMB & INDEX Collection Time: 10/19/18  8:56 PM  
Result Value Ref Range CK 60 26 - 192 U/L  
 CK - MB 2.0 <3.6 NG/ML  
 CK-MB Index 3.3 (H) 0 - 2.5 METABOLIC PANEL, COMPREHENSIVE  
 Collection Time: 10/19/18  8:56 PM  
Result Value Ref Range Sodium 122 (L) 136 - 145 mmol/L Potassium 7.0 (HH) 3.5 - 5.1 mmol/L Chloride 83 (L) 97 - 108 mmol/L  
 CO2 5 (LL) 21 - 32 mmol/L Anion gap 34 (H) 5 - 15 mmol/L Glucose 1,239 (HH) 65 - 100 mg/dL BUN 41 (H) 6 - 20 MG/DL Creatinine 1.99 (H) 0.55 - 1.02 MG/DL  
 BUN/Creatinine ratio 21 (H) 12 - 20 GFR est AA 30 (L) >60 ml/min/1.73m2 GFR est non-AA 24 (L) >60 ml/min/1.73m2 Calcium 9.3 8.5 - 10.1 MG/DL Bilirubin, total 0.6 0.2 - 1.0 MG/DL  
 ALT (SGPT) 31 12 - 78 U/L  
 AST (SGOT) 27 15 - 37 U/L Alk. phosphatase 156 (H) 45 - 117 U/L Protein, total 7.3 6.4 - 8.2 g/dL Albumin 3.8 3.5 - 5.0 g/dL Globulin 3.5 2.0 - 4.0 g/dL A-G Ratio 1.1 1.1 - 2.2 MAGNESIUM Collection Time: 10/19/18  8:56 PM  
Result Value Ref Range Magnesium 2.7 (H) 1.6 - 2.4 mg/dL SAMPLES BEING HELD Collection Time: 10/19/18  8:56 PM  
Result Value Ref Range SAMPLES BEING HELD BLUE   
 COMMENT Add-on orders for these samples will be processed based on acceptable specimen integrity and analyte stability, which may vary by analyte. EKG, 12 LEAD, INITIAL Collection Time: 10/19/18  9:08 PM  
Result Value Ref Range Ventricular Rate 215 BPM  
 Atrial Rate 215 BPM  
 QRS Duration 164 ms Q-T Interval 222 ms QTC Calculation (Bezet) 419 ms Calculated P Axis 73 degrees Calculated R Axis -65 degrees Calculated T Axis -18 degrees Diagnosis Undetermined rhythm Left axis deviation Right bundle branch block Anterior infarct (cited on or before 19-OCT-2018) When compared with ECG of 04-OCT-2018 11:44, 
Current undetermined rhythm precludes rhythm comparison, needs review Right bundle branch block is now present Questionable change in initial forces of Anterior leads LACTIC ACID Collection Time: 10/19/18  9:31 PM  
Result Value Ref Range  Lactic acid 10.1 (HH) 0.4 - 2.0 MMOL/L  
POC CHEM8  
 Collection Time: 10/19/18  9:32 PM  
Result Value Ref Range Calcium, ionized (POC) 1.10 (L) 1.12 - 1.32 mmol/L Sodium (POC) 123 (L) 136 - 145 mmol/L Potassium (POC) 7.0 (HH) 3.5 - 5.1 mmol/L Chloride (POC) 94 (L) 98 - 107 mmol/L  
 CO2 (POC) 8 (LL) 21 - 32 mmol/L Anion gap (POC) 30 (H) 10 - 20 mmol/L Glucose (POC) >700 (HH) 65 - 100 mg/dL BUN (POC) 44 (H) 9 - 20 mg/dL Creatinine (POC) 1.4 (H) 0.6 - 1.3 mg/dL GFRAA, POC 44 (L) >60 ml/min/1.73m2 GFRNA, POC 37 (L) >60 ml/min/1.73m2 Hematocrit (POC) 34 (L) 35.0 - 47.0 % Comment Comment Not Indicated. BLOOD GAS, ARTERIAL Collection Time: 10/19/18  9:46 PM  
Result Value Ref Range pH 7.00 (LL) 7.35 - 7.45    
 PCO2 17 (L) 35.0 - 45.0 mmHg PO2 107 (H) 80 - 100 mmHg O2 SAT 95 92 - 97 % BICARBONATE 4 (L) 22 - 26 mmol/L  
 BASE DEFICIT 25.7 mmol/L  
 O2 METHOD RA    
 SPONTANEOUS RATE 30 Sample source ARTERIAL    
 SITE RB Critical value read back MD KRAIG   
GLUCOSE, POC Collection Time: 10/19/18 11:39 PM  
Result Value Ref Range Glucose (POC) >600 (HH) 65 - 100 mg/dL Performed by Lex Duron Radiologic Studies -  
XR CHEST PORT Final Result CT HEAD WO CONT    (Results Pending) CT Results  (Last 48 hours) None CXR Results  (Last 48 hours) 10/19/18 2158  XR CHEST PORT Final result Impression:  IMPRESSION: Mild bibasilar atelectasis. Narrative:  INDICATION: Shortness of breath. Portable AP semiupright view of the chest.  
   
Direct comparison made to prior chest x-ray dated October 4, 2018. Cardiomediastinal silhouette is stable. There is mild bibasilar atelectasis. No  
pleural fluid is visualized. There is no pneumothorax. Osseous structures are  
intact. Medical Decision Making I am the first provider for this patient.  
 
I reviewed the vital signs, available nursing notes, past medical history, past surgical history, family history and social history. Vital Signs-Reviewed the patient's vital signs. Patient Vitals for the past 12 hrs: 
 Temp Pulse Resp BP SpO2  
10/19/18 2330  (!) 121 26 137/41 100 % 10/19/18 2315  (!) 113 25 128/41 100 % 10/19/18 2300  (!) 117 26 (!) 145/37 100 % 10/19/18 2245  (!) 118 (!) 33 138/52 100 % 10/19/18 2230  (!) 117 28 148/47 99 % 10/19/18 2215  (!) 116 28 149/48 99 % 10/19/18 2200  (!) 121 (!) 31 149/51 98 % 10/19/18 2135  (!) 158 27 124/54 98 % 10/19/18 2107  (!) 112 (!) 33 125/41 100 % 10/19/18 2051  (!) 109 (!) 31 (!) 111/97 100 % 10/19/18 2031  (!) 117 (!) 35 (!) 132/36 100 % 10/19/18 2026 97.1 °F (36.2 °C) (!) 107 (!) 34 (!) 135/38 100 % 10/19/18 2019    (!) 135/38  Pulse Oximetry Analysis - 100% on RA Cardiac Monitor:  
Rate: 107 bpm 
Rhythm: undetermined rhythm 2026 ED EKG interpretation: 2108 Rhythm: undetermined rhythm ; and right bundle branch block. Rate (approx.): 215; Axis: left axis deviation; P wave: normal; QRS interval: normal ; ST/T wave: normal; Other findings: anterior infarct, age undetermined. This EKG was interpreted by Rommel De La Torre Provider. Records Reviewed: Nursing Notes, Old Medical Records, Previous electrocardiograms, Previous Radiology Studies and Previous Laboratory Studies Provider Notes (Medical Decision Making):  
Patient presenting with altered mental status. Pt has stable vitals and POC glucose was checked immediately upon arrival. DDx: medication toxicity, infection, anemia, electrolyte/metabolic anomoly, hypercapnea, stroke/bleed/mass, dehydration, illicit drug intoxication. Will obtain labwork, UA, EKG and CT imaging of the head, chest xray. Will consider adding toxicologic workup if history unclear or warrants further investigation of toxic source. Will continue to monitor and reassess for admission. ED Course: Initial assessment performed. The patients presenting problems have been discussed, and they are in agreement with the care plan formulated and outlined with them. I have encouraged them to ask questions as they arise throughout their visit. Medications  
sodium chloride 0.9 % bolus infusion 1,000 mL (0 mL IntraVENous IV Completed 10/20/18 0050) insulin regular (NOVOLIN R, HUMULIN R) injection 10 Units (10 Units IntraVENous Given 10/19/18 2135)  
sodium bicarbonate 8.4 % (1 mEq/mL) injection 50 mEq (50 mEq IntraVENous Given 10/19/18 2322)  
sodium chloride 0.9 % bolus infusion 1,000 mL (0 mL IntraVENous IV Completed 10/20/18 0050) calcium gluconate injection 1 g (1 g IntraVENous Given 10/19/18 2322) potassium phosphate 15 mmol in 0.9% sodium chloride 250 mL infusion ( IntraVENous Given 10/20/18 1322) Progress Note: 
9:00PM 
Pt remains tachypneic and tachycardic; blood sugar reads \"hi\"; suspect DKA, will give IVF's, give insulin and start insulin drip. 9:30PM 
K 7.0; evidence of peaked T waves on EKG; will treat with for acute hyperkalemia with IV calcium, insulin, glucose. 10:00PM 
Reviewed ABG; pH 7.0, metabolic acidosis noted; will give amp of Bicarb and start bicarb gtt. Progress Note: 
10:00 PM 
Bicarb is at 4. Will start pt on bicarb drip. Consult Note: 
10:32 PM 
Marvin Arango MD spoke with Ivonne Sher MD, Specialty: Nephrology Discussed pt's hx, disposition, and available diagnostic and imaging results. Reviewed care plans. Consultant agrees with plans as outlined. Ivonne Sher MD agrees with bicarb and insulin drip. Will consult patient when in ICU. Progress Note: 
10:50PM 
Nursing unable to obtain additional access; given emergent need for access; will place central line. Consent obtained from son, POA.  
 
Procedure Note - Central Line Placement:  
11:15 PM 
Performed by: Marvin Arango MD 
 
Immediately prior to the procedure, the patient was reevaluated and found suitable for the planned procedure and any planned medications. Immediately prior to the procedure a time out was called to verify the correct patient, procedure, equipment, staff, and marking as appropriate. Area was cleansed with Hibiclens and anesthetized with 2mLs of 1% lidocaine. Prepped and draped in sterile fashion. Landmarks identified. 18 gauge needle with triple lumen catheter was inserted into pt's Right femoral vein with ultrasound guidance. Line sutured in place; sterile dressing applied. Position: Supine Number of attempts: 1 Estimated blood loss: < 10 ccs The procedure took 1-15 minutes, and pt tolerated well. Critical Care Time: CRITICAL CARE NOTE : 
 
11:50 PM 
 
IMPENDING DETERIORATION -Airway, Respiratory, Cardiovascular, CNS, Metabolic and Renal 
ASSOCIATED RISK FACTORS - Hypotension, Shock, Hypoxia, Dysrhythmia, Metabolic changes, Dehydration, Vascular Compromise and CNS Decompensation MANAGEMENT- Bedside Assessment and Supervision of Care INTERPRETATION -  Xrays, CT Scan, Blood Gases, ECG, Blood Pressure and Cardiac Output Measures INTERVENTIONS - hemodynamic mngmt, vascular control, Neurologic interventions , Metobolic interventions and management of DKA with coma requiring insulin drip and fluid resuscitation, management of severe anion gap metabolic acidosis requiring bicarb push and bicarb drip, management of acute hyperkalemia with EKG changes CASE REVIEW - Hospitalist, Medical Sub-Specialist, Family and PCP 
TREATMENT RESPONSE -Improved, but critically ill PERFORMED BY - Self NOTES   : 
 
I have spent 100 minutes of critical care time involved in lab review, consultations with specialist, family decision- making, bedside attention and documentation. During this entire length of time I was immediately available to the patient . Critical Care:   The reason for providing this level of medical care for this critically ill patient was due to a critical illness that impaired one or more vital organ systems, such that there was a high probability of imminent or life threatening deterioration in the patient's condition. This care involved high complexity decision making to assess, manipulate, and support vital system functions, to treat this degree of vital organ system failure, and to prevent further life threatening deterioration of the patients condition. Heather oMntes MD 
 
Disposition: 
Admit Note: 
11:24 PM 
Pt is being admitted by Osvaldo Aguero MD. The results of their tests and reason(s) for their admission have been discussed with pt and/or available family. They convey agreement and understanding for the need to be admitted and for admission diagnosis. Return to ED if worse Diagnosis Clinical Impression: 1. Diabetic ketoacidosis with coma associated with type 2 diabetes mellitus ([facility] 2. Acute metabolic encephalopathy 3. Lactic acidosis 4. High anion gap metabolic acidosis 5. Acute hyperkalemia 6. ONEYDA (acute kidney injury) ([facility] [facility] 7. Hyponatremia Attestations: This note is prepared by Igor Hickey, acting as Scribe for MD Heather Weldon MD: The scribe's documentation has been prepared under my direction and personally reviewed by me in its entirety. I confirm that the note above accurately reflects all work, treatment, procedures, and medical decision making performed by me. This note will not be viewable in [address]

## 2018-10-20 NOTE — PROGRESS NOTES
0100  Report received from ER RN 
 
0130  Pt arrived on unit. A&Ox4. Pt bathed. Shift assessment completed. 5602  Reassessment completed, SEE ASSESSMENT. Labs drawn.

## 2018-10-20 NOTE — ED NOTES
TRANSFER - OUT REPORT: 
 
Verbal report given to dallin pattno on Maria Elena Solo  being transferred to [phone] for routine progression of care Report consisted of patients Situation, Background, Assessment and  
Recommendations(SBAR). Information from the following report(s) SBAR was reviewed with the receiving nurse. Lines:  
Peripheral IV 10/19/18 Right Wrist (Active) Site Assessment Clean, dry, & intact 10/19/2018  9:02 PM  
Infiltration Assessment 0 10/19/2018  9:02 PM  
Dressing Status Clean, dry, & intact 10/19/2018  9:02 PM  
Hub Color/Line Status Pink 10/19/2018  9:02 PM  
Action Taken Blood drawn 10/19/2018  9:02 PM  
  
 
Opportunity for questions and clarification was provided. Patient transported with: 
 Monitor IV infusion

## 2018-10-20 NOTE — CONSULTS
[address]    Desmaverick Rachel  MR#: [id number]  : 1940  ACCOUNT #: [de-identified]   DATE OF SERVICE: 10/20/2018    REASON FOR CONSULTATION:  Kindly asked by Dr. Jessica Magallon to see the patient in consultation for ICU care. HISTORY OF PRESENT ILLNESS:  The patient is a 50-year-old woman with a history of diabetes, hypertension and hypothyroidism who presented to the emergency room last night with altered mental status. She is unable to provide information for this consultation. The patient was found to have acute renal failure, hyperkalemia, metabolic acidosis and altered mentation. PAST MEDICAL HISTORY:  Diabetes, hypertension, hypothyroidism. PAST SURGICAL HISTORY:  Recent thyroidectomy. Additional history of cholecystectomy and hysterectomy. SOCIAL HISTORY:  Unobtainable from the patient. Reportedly, a lifelong nonsmoker. FAMILY HISTORY:  Unobtainable from the patient. Reportedly positive for diabetes. REVIEW OF SYSTEMS:  Unobtainable from the patient. ALLERGIES:  NO KNOWN DRUG ALLERGIES. PHYSICAL EXAMINATION:  GENERAL:  Ill-appearing woman with altered mentation. VITAL SIGNS:  Temperature 98.8, heart rate 85, respiratory rate 19, blood pressure 130/49, sats are 100%. HEENT:  Pupils are round. Sclerae anicteric. Efrain Priyank Oropharynx unremarkable. NECK:  Supple. No JVD or stridor. CHEST:  Bilaterally symmetric. LUNGS:  Clear. No wheezes, rales or rhonchi. HEART:  Regular rate and rhythm. ABDOMEN:  Soft, nontender. EXTREMITIES:  With edema. There are palpable peripheral pulses. She clearly is protecting her airway. LABORATORY DATA:  Sodium 137, potassium 3.8, chloride 101, bicarbonate 13, BUN 34, creatinine 1.78. Arterial blood gas pH is 7.35, pCO2 of 19, pO2 of 118, bicarbonate 11 consistent with a partially compensated metabolic acidosis. White count 15, hemoglobin 10.5, platelet count 705. Chest x-ray:  Minimal bibasilar atelectasis. Urinalysis, I think was completed. IMPRESSION:  A 32-year-old woman with a history of diabetes, hypertension and hypothyroidism, was admitted with altered mentation, metabolic acidosis, hyperkalemia, renal insufficiency and hyperglycemia. The patient was diagnosed with diabetic ketoacidosis and received IV fluid resuscitation and IV insulin. Unfortunately, her mentation remains poor, but her metabolic acidosis and renal insufficiency are improving. I do not see a urinalysis though. On chest x-ray, there is nothing to suggest active pneumonia. PLAN:    1.  IV fluid per nephrology. 2.  IV insulin infusion. 3.  Holding home blood pressure medicines. 4.  GI and DVT prophylaxis. 5.  Follow up electrolytes.       MD Regina Ford / Jonny Hemphill  D: 10/20/2018 10:52     T: 10/20/2018 12:20  JOB #: [id number]

## 2018-10-20 NOTE — ED TRIAGE NOTES
Pt arrived via EMS from home with AMS per family. Pt noted to have Bld Gluc of \"Hi\" X 2 and an end title CO2 of 13 with good wave. Pt also tachycardic in the 120's and increased respirations. GCS = 14. Pt has not had insulin today.

## 2018-10-20 NOTE — H&P
Hospitalist Admission NoteNAME: Alpa Pickett :  1940 MRN:  [id number] Date/Time:  10/19/2018 11:51 PM 
 
Patient PCP: Spencer Morales MD 
_____________________________________________________________________ Given the patient's current clinical presentation, I have a high level of concern for decompensation if discharged from the emergency department. Complex decision making was performed, which includes reviewing the patient's available past medical records, laboratory results, and x-ray films. My assessment of this patient's clinical condition and my plan of care is as follows. Assessment / Plan: Hyperosmolar hyperglycemia with DKA Lactic / metabolic acidosis Hyponatremia Hyperkalemia ONEYDA 
-completing NS in ER 
-starting IV bicarb infusion (AG 34, ABG with pH 7.0) -starting IV insulin infusion. Check A1c; follow BG closely 
-follow K, Mg Phos closely. Repeat Lactic acid Acute encephalopathy 
-multifactorial from all of the above 
-CT head pending S/P thyroidectomy 
-continue synthroid Code Status:   Full, unable to discuss Surrogate Decision Maker: DVT Prophylaxis:  Heparin SQ 
GI Prophylaxis: not indicated Subjective: CHIEF COMPLAINT:   AMS and high blood glucose HISTORY OF PRESENT ILLNESS:    
Alpa Pickett is a 69 y.o.  female with DM, HTN, and thyroid disease who presents via EMS for AMS. Patient unable to provide a history. Per ER documentation, patient noted twice today that her blood sugar was high. When she became altered, family called for EMS. ER evaluation is remarkable for ONEYDA, DKA, hyperosmolar state, hyponatremia and acidosis. Her K 7 and EKG with peak Ts. IV bicarb and IV insulin infusions were started. We were asked to admit for work up and evaluation of the above problems. Past Medical History:  
Diagnosis Date  Diabetes ([facility]  Heart failure ([facility]   
 unknown to family  Hypercholesteremia  Hypertension  Stroke ([facility]  Thyroid disease Past Surgical History:  
Procedure Laterality Date  HX GYN    
 HX HEENT    
 thyroidectomy  HX HYSTERECTOMY  REMOVAL GALLBLADDER    
 THYROIDECTOMY Social History Tobacco Use  Smoking status: Never Smoker  Smokeless tobacco: Never Used Substance Use Topics  Alcohol use: No  
  Comment: been years Family History Problem Relation Age of Onset  Diabetes Father  No Known Problems Mother No Known Allergies Prior to Admission medications Medication Sig Start Date End Date Taking? Authorizing Provider  
losartan-hydroCHLOROthiazide (HYZAAR) 100-25 mg per tablet TAKE 1 TABLET BY MOUTH DAILY 10/12/18   Radha Eller MD  
pravastatin (PRAVACHOL) 80 mg tablet Take 1 Tab by mouth daily. 10/12/18   Radha Eller MD  
insulin detemir U-100 (LEVEMIR FLEXTOUCH) 100 unit/mL (3 mL) inpn 30 units every morning3 8/31/18   Radha Eller MD  
fenofibrate nanocrystallized (TRICOR) 145 mg tablet TAKE 1 TABLET BY MOUTH EVERY DAY 4/10/18   Radha Eller MD  
levothyroxine (SYNTHROID) 150 mcg tablet TAKE 1 TABLET BY MOUTH EVERY DAY 4/10/18   Radha Eller MD  
clopidogrel (PLAVIX) 75 mg tab TAKE 1 TAB BY MOUTH DAILY. 4/10/18   Radha Eller MD  
amLODIPine (NORVASC) 10 mg tablet TAKE 1 TABLET BY MOUTH EVERY MORNING 3/28/18   Radha Eller MD  
brimonidine (ALPHAGAN) 0.15 % ophthalmic solution Administer 1 Drop to both eyes two (2) times a day. 1/30/15   Provider, Historical  
 
 
REVIEW OF SYSTEMS:    
I am not able to complete the review of systems because: The patient is intubated and sedated  
y The patient has altered mental status due to his acute medical problems The patient has baseline aphasia from prior stroke(s) The patient has baseline dementia and is not reliable historian The patient is in acute medical distress and unable to provide information Objective: VITALS:   
Visit Vitals /41 Pulse (!) 121 Temp 97.1 °F (36.2 °C) Resp 26 Ht 5' 2\" (1.575 m) Wt 76.1 kg (167 lb 12.8 oz) SpO2 100% BMI 30.69 kg/m² PHYSICAL EXAM: 
 
General:    Arouses to voice. Ill appearing. appears stated age. HEENT: Atraumatic, anicteric sclerae, pink conjunctivae, Dry mucous memb Neck:  Supple, symmetrical,  thyroid: non tender Lungs:   Clear to auscultation bilaterally. No Wheezing or Rhonchi. No rales. Chest wall:  No tenderness  No Accessory muscle use. Heart:   Regular  rhythm,    No edema Abdomen:   Soft, non-tender. Not distended. Bowel sounds normal 
Extremities: No cyanosis. No clubbing,  Skin turgor poor, Capillary refill normal, Radial dial pulse 2+ Skin:     Not pale. Not Jaundiced  No rashes (+) right groin central line Psych:  Poor insight. .  Not anxious or agitated. Neurologic: EOMs intact. No facial asymmetry. Lethargic but Arouses to voice and touch. Speech weak . Follows simple commands. Sensation grossly intact. 
 
_______________________________________________________________________ Care Plan discussed with: 
  Comments Patient x  but AMS Family RN x Care Manager Consultant:     
_______________________________________________________________________ Expected  Disposition:  
Home with Family HH/PT/OT/RN   
SNF/LTC x  
TAYA   
________________________________________________________________________ TOTAL TIME:    Minutes Critical Care Provided   45  Minutes non procedure based I have provided    45    minutes of critical care time. During this entire length of time I was immediately available to the patient.    
 
The reason for providing this level of medical care was due to a critical illness that impaired one or more vital organ systems, such that there was a high probability of imminent or life threatening deterioration in the patient's condition. This care involved high complexity decision making which includes reviewing the patient's past medical records, current laboratory results, and actual Xray films in order to assess, support vital system function, and to treat this degree of vital organ system failure, and to prevent further life threatening deterioration of the patients condition. I have also discussed this case with the involved ED physician Comments  
 x Reviewed previous records  
>50% of visit spent in counseling and coordination of care  Discussion with patient and/or family and questions answered Given the patient's current clinical presentation, I have a high level of concern for decompensation if discharged from the ED. Complex decision making was performed which includes reviewing the patient's available past medical records, laboratory results, and Xray films. I have also directly communicated my plan and discussed this case with the involved ED physician.  
 
____________________________________________________________________ Nick Ospina MD 
 
Procedures: see electronic medical records for all procedures/Xrays and details which were not copied into this note but were reviewed prior to creation of Plan. LAB DATA REVIEWED:   
Recent Results (from the past 24 hour(s)) CBC WITH AUTOMATED DIFF Collection Time: 10/19/18  8:56 PM  
Result Value Ref Range WBC 18.3 (H) 3.6 - 11.0 K/uL  
 RBC 3.59 (L) 3.80 - 5.20 M/uL  
 HGB 11.3 (L) 11.5 - 16.0 g/dL HCT 38.0 35.0 - 47.0 % .8 (H) 80.0 - 99.0 FL  
 MCH 31.5 26.0 - 34.0 PG  
 MCHC 29.7 (L) 30.0 - 36.5 g/dL  
 RDW 14.0 11.5 - 14.5 % PLATELET 051 717 - 688 K/uL MPV 10.5 8.9 - 12.9 FL  
 NRBC 0.0 0  WBC ABSOLUTE NRBC 0.00 0.00 - 0.01 K/uL NEUTROPHILS 79 (H) 32 - 75 % BAND NEUTROPHILS 1 % LYMPHOCYTES 6 (L) 12 - 49 % MONOCYTES 11 5 - 13 % EOSINOPHILS 0 0 - 7 % BASOPHILS 0 0 - 1 % MYELOCYTES 3 % IMMATURE GRANULOCYTES 0 0.0 - 0.5 % ABS. NEUTROPHILS 14.6 (H) 1.8 - 8.0 K/UL  
 ABS. LYMPHOCYTES 1.1 0.8 - 3.5 K/UL  
 ABS. MONOCYTES 2.0 (H) 0.0 - 1.0 K/UL  
 ABS. EOSINOPHILS 0.0 0.0 - 0.4 K/UL  
 ABS. BASOPHILS 0.0 0.0 - 0.1 K/UL  
 ABS. IMM. GRANS. 0.0 0.00 - 0.04 K/UL  
 DF MANUAL    
 RBC COMMENTS MACROCYTOSIS 
1+ 
    
CK W/ CKMB & INDEX Collection Time: 10/19/18  8:56 PM  
Result Value Ref Range CK 60 26 - 192 U/L  
 CK - MB 2.0 <3.6 NG/ML  
 CK-MB Index 3.3 (H) 0 - 2.5 METABOLIC PANEL, COMPREHENSIVE Collection Time: 10/19/18  8:56 PM  
Result Value Ref Range Sodium 122 (L) 136 - 145 mmol/L Potassium 7.0 (HH) 3.5 - 5.1 mmol/L Chloride 83 (L) 97 - 108 mmol/L  
 CO2 5 (LL) 21 - 32 mmol/L Anion gap 34 (H) 5 - 15 mmol/L Glucose 1,239 (HH) 65 - 100 mg/dL BUN 41 (H) 6 - 20 MG/DL Creatinine 1.99 (H) 0.55 - 1.02 MG/DL  
 BUN/Creatinine ratio 21 (H) 12 - 20 GFR est AA 30 (L) >60 ml/min/1.73m2 GFR est non-AA 24 (L) >60 ml/min/1.73m2 Calcium 9.3 8.5 - 10.1 MG/DL Bilirubin, total 0.6 0.2 - 1.0 MG/DL  
 ALT (SGPT) 31 12 - 78 U/L  
 AST (SGOT) 27 15 - 37 U/L Alk. phosphatase 156 (H) 45 - 117 U/L Protein, total 7.3 6.4 - 8.2 g/dL Albumin 3.8 3.5 - 5.0 g/dL Globulin 3.5 2.0 - 4.0 g/dL A-G Ratio 1.1 1.1 - 2.2 MAGNESIUM Collection Time: 10/19/18  8:56 PM  
Result Value Ref Range Magnesium 2.7 (H) 1.6 - 2.4 mg/dL SAMPLES BEING HELD Collection Time: 10/19/18  8:56 PM  
Result Value Ref Range SAMPLES BEING HELD BLUE   
 COMMENT Add-on orders for these samples will be processed based on acceptable specimen integrity and analyte stability, which may vary by analyte. EKG, 12 LEAD, INITIAL Collection Time: 10/19/18  9:08 PM  
Result Value Ref Range Ventricular Rate 215 BPM  
 Atrial Rate 215 BPM  
 QRS Duration 164 ms Q-T Interval 222 ms QTC Calculation (Bezet) 419 ms Calculated P Axis 73 degrees Calculated R Axis -65 degrees Calculated T Axis -18 degrees Diagnosis Undetermined rhythm Left axis deviation Right bundle branch block Anterior infarct (cited on or before 19-OCT-2018) When compared with ECG of 04-OCT-2018 11:44, 
Current undetermined rhythm precludes rhythm comparison, needs review Right bundle branch block is now present Questionable change in initial forces of Anterior leads LACTIC ACID Collection Time: 10/19/18  9:31 PM  
Result Value Ref Range Lactic acid 10.1 (HH) 0.4 - 2.0 MMOL/L  
POC CHEM8 Collection Time: 10/19/18  9:32 PM  
Result Value Ref Range Calcium, ionized (POC) 1.10 (L) 1.12 - 1.32 mmol/L Sodium (POC) 123 (L) 136 - 145 mmol/L Potassium (POC) 7.0 (HH) 3.5 - 5.1 mmol/L Chloride (POC) 94 (L) 98 - 107 mmol/L  
 CO2 (POC) 8 (LL) 21 - 32 mmol/L Anion gap (POC) 30 (H) 10 - 20 mmol/L Glucose (POC) >700 (HH) 65 - 100 mg/dL BUN (POC) 44 (H) 9 - 20 mg/dL Creatinine (POC) 1.4 (H) 0.6 - 1.3 mg/dL GFRAA, POC 44 (L) >60 ml/min/1.73m2 GFRNA, POC 37 (L) >60 ml/min/1.73m2 Hematocrit (POC) 34 (L) 35.0 - 47.0 % Comment Comment Not Indicated. BLOOD GAS, ARTERIAL Collection Time: 10/19/18  9:46 PM  
Result Value Ref Range pH 7.00 (LL) 7.35 - 7.45    
 PCO2 17 (L) 35.0 - 45.0 mmHg PO2 107 (H) 80 - 100 mmHg O2 SAT 95 92 - 97 % BICARBONATE 4 (L) 22 - 26 mmol/L  
 BASE DEFICIT 25.7 mmol/L  
 O2 METHOD RA    
 SPONTANEOUS RATE 30 Sample source ARTERIAL    
 SITE RB Critical value read back MD KRAIG   
GLUCOSE, POC Collection Time: 10/19/18 11:39 PM  
Result Value Ref Range Glucose (POC) >600 (HH) 65 - 100 mg/dL Performed by Lex Duron

## 2018-10-20 NOTE — PROGRESS NOTES
Hospital Progress Note NAME:  Dogu Lang :   1940 MRN:  [id number] Date/Time:  10/20/2018 11:12 AM 
 
Plan: 1. Continue insulin protocol 2. Trial diet Risk of Deterioration: Low  []           Moderate  [x]           High  [] Assessment:  
Principal Problem: 
  [facility]abetic k[facility]acidoses[facility] (2017) Active Problems: 
  ONEYDA (acute kidney injury) ([facility] (10/20/2018) Vascular dementia without behavioral disturbance (10/20/2018) Lactic acidosis (10/20/2018) Hyperosmolar hyponatremia (10/20/2018) Hyperkalemia (10/20/2018) Hyperglycemia due to type 1 diabetes mellitus ([facility] (10/15/2018) Acute metabolic encephalopathy () Admission History: 
Doug Lang is a 69 y.o.  female with DM, HTN, and thyroid disease who presents via EMS for AMS. Patient unable to provide a history. Per ER documentation, patient noted twice today that her blood sugar was high. When she became altered, family called for EMS. ER evaluation is remarkable for ONEYDA, DKA, hyperosmolar state, hyponatremia and acidosis. Her K 7 and EKG with peak Ts. IV bicarb and IV insulin infusions were started. Hospitalist admitted for work up and evaluation of the above problems Subjective/Interium history:  
Feeling better - patient remains on insulin protocol with metabolic corrections ongoing 11 Point Review of Systems:  
Negative except [x]            Unable to obtain ROS due to:      
[x]            mental status change []            sedated []            intubated Social History Tobacco Use  Smoking status: Never Smoker  Smokeless tobacco: Never Used Substance Use Topics  Alcohol use: No  
  Comment: been years Medications reviewed: 
Current Facility-Administered Medications Medication Dose Route Frequency  mupirocin (BACTROBAN) 2 % ointment   Both Nostrils BID  
  influenza vaccine 2018- (6 mos+)(PF) (FLUARIX QUAD/FLULAVAL QUAD) injection 0.5 mL  0.5 mL IntraMUSCular PRIOR TO DISCHARGE  famotidine (PF) (PEPCID) 20 mg in sodium chloride 0.9% 10 mL injection  20 mg IntraVENous Q12H  
 sodium chloride (NS) flush 5-10 mL  5-10 mL IntraVENous PRN  
 insulin regular (NOVOLIN R, HUMULIN R) 100 Units in 0.9% sodium chloride 100 mL infusion  0-50 Units/hr IntraVENous TITRATE  dextrose (D50W) injection syrg 12.5-25 g  25-50 mL IntraVENous PRN  
 sodium bicarbonate 150 mEq/1000 mL D5W (premix)   IntraVENous CONTINUOUS  
 acetaminophen (TYLENOL) suppository 650 mg  650 mg Rectal Q4H PRN  
 ondansetron (ZOFRAN) injection 4 mg  4 mg IntraVENous Q4H PRN  
 heparin (porcine) injection 5,000 Units  5,000 Units SubCUTAneous Q12H  levothyroxine (SYNTHROID) tablet 150 mcg  150 mcg Oral 6am  
  
 
Objective:  
Vitals: 
Visit Vitals /49 Pulse 85 Temp 98.8 °F (37.1 °C) Resp 19 Ht 5' 2\" (1.575 m) Wt 171 lb 4.8 oz (77.7 kg) SpO2 100% BMI 31.33 kg/m² Temp (24hrs), Av.2 °F (36.8 °C), Min:97.1 °F (36.2 °C), Max:98.8 °F (37.1 °C) O2 Device: Room air Last 24hr Input/Output: 
 
Intake/Output Summary (Last 24 hours) at 10/20/2018 1112 Last data filed at 10/20/2018 0800 Gross per 24 hour Intake 547.89 ml Output 2550 ml Net -2002. 11 ml PHYSICAL EXAM: 
General:    Alert, cooperative, no distress, appears stated age. Head:   Normocephalic, without obvious abnormality, atraumatic. Eyes:   Conjunctivae/corneas clear. PERRLA Nose:  Nares normal. No drainage or sinus tenderness. Throat:    Lips, mucosa, and tongue normal.  No Thrush Neck:  Supple, symmetrical,  no adenopathy, thyroid: non tender 
  no carotid bruit and no JVD. Back:    Symmetric,  No CVA tenderness. Lungs:   Clear to auscultation bilaterally. No Wheezing or Rhonchi. No rales. Chest wall:  No tenderness or deformity. No Accessory muscle use. Heart:   Regular rate and rhythm,  no murmur, rub or gallop. Abdomen:   Soft, non-tender. Not distended. Bowel sounds normal. No masses Lab Data Reviewed: 
 
Recent Labs 10/19/18 
2056 WBC 18.3* HGB 11.3* HCT 38.0  
 Recent Labs 10/20/18 
3771 10/20/18 
0246 10/19/18 
2056  137 122* K 3.4* 3.8 7.0*  
 101 83* CO2 29 13* 5*  
GLU 81 532* 1,239* BUN 29* 34* 41* CREA 1.15* 1.78* 1.99* CA 8.8 8.7 9.3 MG 2.1 2.4 2.7* PHOS 1.6* 1.9*  --   
ALB  --   --  3.8 TBILI  --   --  0.6 SGOT  --   --  27 ALT  --   --  31 Lab Results Component Value Date/Time Glucose (POC) 72 10/20/2018 11:00 AM  
 Glucose (POC) 85 10/20/2018 09:59 AM  
 Glucose (POC) 115 (H) 10/20/2018 09:04 AM  
 Glucose (POC) 184 (H) 10/20/2018 07:58 AM  
 Glucose (POC) 222 (H) 10/20/2018 06:53 AM  
 
Recent Labs 10/19/18 
2146 PH 7.00* PCO2 17* PO2 107* HCO3 4*  
 
 
___________________________________________________ 
___________________________________________________ Attending Physician: Danny Anguiano MD

## 2018-10-20 NOTE — CONSULTS
Note dicated    Impression  ========  DKA  Met acidosis/hyperkalemia/acute renal insf    Plan  ===  IVF  Bicarb  Insulin gtt  U/A

## 2018-10-20 NOTE — PROGRESS NOTES
Critical Result Notification Received and verbally read back critical CO2 of 13. Primary nurse, Georgia Ron, notified. Flowsheet documented.

## 2018-10-21 PROBLEM — E11.21 TYPE 2 DIABETES WITH NEPHROPATHY (HCC): Status: ACTIVE | Noted: 2018-10-21

## 2018-10-21 LAB
ANION GAP SERPL CALC-SCNC: 12 MMOL/L (ref 5–15)
ANION GAP SERPL CALC-SCNC: 7 MMOL/L (ref 5–15)
BUN SERPL-MCNC: 19 MG/DL (ref 6–20)
BUN SERPL-MCNC: 24 MG/DL (ref 6–20)
BUN/CREAT SERPL: 19 (ref 12–20)
BUN/CREAT SERPL: 25 (ref 12–20)
CALCIUM SERPL-MCNC: 7.7 MG/DL (ref 8.5–10.1)
CALCIUM SERPL-MCNC: 7.8 MG/DL (ref 8.5–10.1)
CHLORIDE SERPL-SCNC: 103 MMOL/L (ref 97–108)
CHLORIDE SERPL-SCNC: 106 MMOL/L (ref 97–108)
CO2 SERPL-SCNC: 24 MMOL/L (ref 21–32)
CO2 SERPL-SCNC: 28 MMOL/L (ref 21–32)
CREAT SERPL-MCNC: 0.97 MG/DL (ref 0.55–1.02)
CREAT SERPL-MCNC: 1 MG/DL (ref 0.55–1.02)
ERYTHROCYTE [DISTWIDTH] IN BLOOD BY AUTOMATED COUNT: 13.5 % (ref 11.5–14.5)
GLUCOSE BLD STRIP.AUTO-MCNC: 133 MG/DL (ref 65–100)
GLUCOSE BLD STRIP.AUTO-MCNC: 147 MG/DL (ref 65–100)
GLUCOSE BLD STRIP.AUTO-MCNC: 149 MG/DL (ref 65–100)
GLUCOSE BLD STRIP.AUTO-MCNC: 179 MG/DL (ref 65–100)
GLUCOSE BLD STRIP.AUTO-MCNC: 181 MG/DL (ref 65–100)
GLUCOSE BLD STRIP.AUTO-MCNC: 182 MG/DL (ref 65–100)
GLUCOSE BLD STRIP.AUTO-MCNC: 213 MG/DL (ref 65–100)
GLUCOSE BLD STRIP.AUTO-MCNC: 217 MG/DL (ref 65–100)
GLUCOSE BLD STRIP.AUTO-MCNC: 219 MG/DL (ref 65–100)
GLUCOSE BLD STRIP.AUTO-MCNC: 219 MG/DL (ref 65–100)
GLUCOSE BLD STRIP.AUTO-MCNC: 265 MG/DL (ref 65–100)
GLUCOSE BLD STRIP.AUTO-MCNC: 277 MG/DL (ref 65–100)
GLUCOSE BLD STRIP.AUTO-MCNC: 279 MG/DL (ref 65–100)
GLUCOSE BLD STRIP.AUTO-MCNC: 279 MG/DL (ref 65–100)
GLUCOSE BLD STRIP.AUTO-MCNC: 346 MG/DL (ref 65–100)
GLUCOSE BLD STRIP.AUTO-MCNC: 92 MG/DL (ref 65–100)
GLUCOSE SERPL-MCNC: 151 MG/DL (ref 65–100)
GLUCOSE SERPL-MCNC: 308 MG/DL (ref 65–100)
HCT VFR BLD AUTO: 28.8 % (ref 35–47)
HGB BLD-MCNC: 9.9 G/DL (ref 11.5–16)
MAGNESIUM SERPL-MCNC: 1.9 MG/DL (ref 1.6–2.4)
MAGNESIUM SERPL-MCNC: 1.9 MG/DL (ref 1.6–2.4)
MCH RBC QN AUTO: 31.7 PG (ref 26–34)
MCHC RBC AUTO-ENTMCNC: 34.4 G/DL (ref 30–36.5)
MCV RBC AUTO: 92.3 FL (ref 80–99)
NRBC # BLD: 0 K/UL (ref 0–0.01)
NRBC BLD-RTO: 0 PER 100 WBC
PHOSPHATE SERPL-MCNC: 2.2 MG/DL (ref 2.6–4.7)
PHOSPHATE SERPL-MCNC: 2.6 MG/DL (ref 2.6–4.7)
PLATELET # BLD AUTO: 254 K/UL (ref 150–400)
PMV BLD AUTO: 9.4 FL (ref 8.9–12.9)
POTASSIUM SERPL-SCNC: 4 MMOL/L (ref 3.5–5.1)
POTASSIUM SERPL-SCNC: 4.5 MMOL/L (ref 3.5–5.1)
RBC # BLD AUTO: 3.12 M/UL (ref 3.8–5.2)
SERVICE CMNT-IMP: ABNORMAL
SERVICE CMNT-IMP: NORMAL
SODIUM SERPL-SCNC: 139 MMOL/L (ref 136–145)
SODIUM SERPL-SCNC: 141 MMOL/L (ref 136–145)
WBC # BLD AUTO: 11.9 K/UL (ref 3.6–11)

## 2018-10-21 PROCEDURE — 74011636637 HC RX REV CODE- 636/637: Performed by: EMERGENCY MEDICINE

## 2018-10-21 PROCEDURE — 82962 GLUCOSE BLOOD TEST: CPT

## 2018-10-21 PROCEDURE — 85027 COMPLETE CBC AUTOMATED: CPT | Performed by: INTERNAL MEDICINE

## 2018-10-21 PROCEDURE — 80048 BASIC METABOLIC PNL TOTAL CA: CPT | Performed by: INTERNAL MEDICINE

## 2018-10-21 PROCEDURE — 83735 ASSAY OF MAGNESIUM: CPT | Performed by: INTERNAL MEDICINE

## 2018-10-21 PROCEDURE — 74011636637 HC RX REV CODE- 636/637: Performed by: INTERNAL MEDICINE

## 2018-10-21 PROCEDURE — 74011250636 HC RX REV CODE- 250/636: Performed by: INTERNAL MEDICINE

## 2018-10-21 PROCEDURE — 65270000015 HC RM PRIVATE ONCOLOGY

## 2018-10-21 PROCEDURE — 77030022017 HC DRSG HEMO QCLOT ZMED -A

## 2018-10-21 PROCEDURE — 74011000250 HC RX REV CODE- 250: Performed by: INTERNAL MEDICINE

## 2018-10-21 PROCEDURE — 36415 COLL VENOUS BLD VENIPUNCTURE: CPT | Performed by: INTERNAL MEDICINE

## 2018-10-21 PROCEDURE — 84100 ASSAY OF PHOSPHORUS: CPT | Performed by: INTERNAL MEDICINE

## 2018-10-21 PROCEDURE — 74011250637 HC RX REV CODE- 250/637: Performed by: INTERNAL MEDICINE

## 2018-10-21 PROCEDURE — 74011000258 HC RX REV CODE- 258: Performed by: EMERGENCY MEDICINE

## 2018-10-21 RX ORDER — INSULIN GLARGINE 100 [IU]/ML
30 INJECTION, SOLUTION SUBCUTANEOUS DAILY
Status: DISCONTINUED | OUTPATIENT
Start: 2018-10-21 | End: 2018-10-23 | Stop reason: HOSPADM

## 2018-10-21 RX ORDER — PRAVASTATIN SODIUM 80 MG/1
80 TABLET ORAL DAILY
Qty: 90 TAB | Refills: 3 | Status: SHIPPED | OUTPATIENT
Start: 2018-10-21 | End: 2018-10-25 | Stop reason: SDUPTHER

## 2018-10-21 RX ORDER — SUCCINYLCHOLINE CHLORIDE 20 MG/ML
INJECTION INTRAMUSCULAR; INTRAVENOUS
Status: DISCONTINUED
Start: 2018-10-21 | End: 2018-10-21

## 2018-10-21 RX ORDER — INSULIN LISPRO 100 [IU]/ML
INJECTION, SOLUTION INTRAVENOUS; SUBCUTANEOUS
Status: DISCONTINUED | OUTPATIENT
Start: 2018-10-21 | End: 2018-10-22

## 2018-10-21 RX ORDER — DEXTROSE 50 % IN WATER (D50W) INTRAVENOUS SYRINGE
12.5-25 AS NEEDED
Status: DISCONTINUED | OUTPATIENT
Start: 2018-10-21 | End: 2018-10-23 | Stop reason: HOSPADM

## 2018-10-21 RX ORDER — AMLODIPINE BESYLATE 10 MG/1
TABLET ORAL
Qty: 90 TAB | Refills: 3 | Status: SHIPPED | OUTPATIENT
Start: 2018-10-21 | End: 2019-11-02 | Stop reason: SDUPTHER

## 2018-10-21 RX ORDER — PROPOFOL 10 MG/ML
INJECTION, EMULSION INTRAVENOUS
Status: DISCONTINUED
Start: 2018-10-21 | End: 2018-10-21

## 2018-10-21 RX ORDER — CLOPIDOGREL BISULFATE 75 MG/1
TABLET ORAL
Qty: 30 TAB | Refills: 11 | Status: SHIPPED | OUTPATIENT
Start: 2018-10-21 | End: 2019-06-15

## 2018-10-21 RX ORDER — MAGNESIUM SULFATE 100 %
4 CRYSTALS MISCELLANEOUS AS NEEDED
Status: DISCONTINUED | OUTPATIENT
Start: 2018-10-21 | End: 2018-10-23 | Stop reason: HOSPADM

## 2018-10-21 RX ORDER — LEVOTHYROXINE SODIUM 150 UG/1
TABLET ORAL
Qty: 30 TAB | Refills: 11 | Status: SHIPPED | OUTPATIENT
Start: 2018-10-21 | End: 2019-10-23 | Stop reason: SDUPTHER

## 2018-10-21 RX ORDER — HEPARIN SODIUM 5000 [USP'U]/ML
5000 INJECTION, SOLUTION INTRAVENOUS; SUBCUTANEOUS EVERY 8 HOURS
Status: DISCONTINUED | OUTPATIENT
Start: 2018-10-21 | End: 2018-10-23 | Stop reason: CLARIF

## 2018-10-21 RX ADMIN — HEPARIN SODIUM 5000 UNITS: 5000 INJECTION INTRAVENOUS; SUBCUTANEOUS at 00:36

## 2018-10-21 RX ADMIN — DEXTROSE MONOHYDRATE, SODIUM CHLORIDE, AND POTASSIUM CHLORIDE 125 ML/HR: 50; 4.5; 1.49 INJECTION, SOLUTION INTRAVENOUS at 08:48

## 2018-10-21 RX ADMIN — SODIUM CHLORIDE 0.1 UNITS/HR: 900 INJECTION, SOLUTION INTRAVENOUS at 07:34

## 2018-10-21 RX ADMIN — HEPARIN SODIUM 5000 UNITS: 5000 INJECTION INTRAVENOUS; SUBCUTANEOUS at 22:28

## 2018-10-21 RX ADMIN — HEPARIN SODIUM 5000 UNITS: 5000 INJECTION INTRAVENOUS; SUBCUTANEOUS at 14:58

## 2018-10-21 RX ADMIN — MUPIROCIN: 20 OINTMENT TOPICAL at 08:02

## 2018-10-21 RX ADMIN — MUPIROCIN: 20 OINTMENT TOPICAL at 22:24

## 2018-10-21 RX ADMIN — FAMOTIDINE 20 MG: 10 INJECTION, SOLUTION INTRAVENOUS at 00:36

## 2018-10-21 RX ADMIN — LEVOTHYROXINE SODIUM 150 MCG: 150 TABLET ORAL at 05:50

## 2018-10-21 RX ADMIN — DEXTROSE MONOHYDRATE, SODIUM CHLORIDE, AND POTASSIUM CHLORIDE 125 ML/HR: 50; 4.5; 1.49 INJECTION, SOLUTION INTRAVENOUS at 00:23

## 2018-10-21 RX ADMIN — FAMOTIDINE 20 MG: 10 INJECTION, SOLUTION INTRAVENOUS at 08:01

## 2018-10-21 RX ADMIN — INSULIN GLARGINE 30 UNITS: 100 INJECTION, SOLUTION SUBCUTANEOUS at 11:33

## 2018-10-21 RX ADMIN — SODIUM CHLORIDE 2.2 UNITS/HR: 900 INJECTION, SOLUTION INTRAVENOUS at 00:23

## 2018-10-21 NOTE — PROGRESS NOTES
Famotidine HD Dosing Adjustment Indication:  SUP ppx Plan:  Famotidine 20mg Q12h/BID has been adjusted to Famotidine 20 mg IV/PO q24h for e CrCl. Estimated Creatinine Clearance: 47.2 mL/min (based on SCr of 1 mg/dL). Estimated Creatinine Clearance (using IBW):37.9 mL/min Thank you, 
Adriana Thakkar, [facility]

## 2018-10-21 NOTE — PROGRESS NOTES
Problem: Falls - Risk of 
Goal: *Absence of Falls Document José Scales Fall Risk and appropriate interventions in the flowsheet. Outcome: Progressing Towards Goal 
Fall Risk Interventions: 
  
 
  
 
Medication Interventions: Bed/chair exit alarm, Evaluate medications/consider consulting pharmacy Elimination Interventions: Bed/chair exit alarm, Call light in reach, Patient to call for help with toileting needs, Toileting schedule/hourly rounds History of Falls Interventions: Bed/chair exit alarm, Room close to nurse's station, Evaluate medications/consider consulting pharmacy, Door open when patient unattended Problem: Pressure Injury - Risk of 
Goal: *Prevention of pressure injury Document Madhu Scale and appropriate interventions in the flowsheet. Outcome: Progressing Towards Goal 
Pressure Injury Interventions: Activity Interventions: Assess need for specialty bed, Pressure redistribution bed/mattress(bed type), PT/OT evaluation Mobility Interventions: Float heels, HOB 30 degrees or less, Pressure redistribution bed/mattress (bed type), PT/OT evaluation Nutrition Interventions: Document food/fluid/supplement intake, Discuss nutritional consult with provider, Offer support with meals,snacks and hydration

## 2018-10-21 NOTE — PROGRESS NOTES
Anticoagulation Dosing Indication:  DVT ppx Estimated Creatinine Clearance: 47.2 mL/min (based on SCr of 1 mg/dL). Estimated Creatinine Clearance (using IBW):37.9 mL/min Recent Labs 10/21/18 
8106 10/21/18 
0025 10/20/18 
1512 10/20/18 
7942  10/19/18 
2056 CREA 1.00 0.97 1.04* 1.15*   < > 1.99* ALB  --   --   --   --   --  3.8 HGB  --  9.9*  --   --   --  11.3* HCT  --  28.8*  --   --   --  38.0  
PLT  --  254  --   --   --  357  
 < > = values in this interval not displayed. Wt Readings from Last 1 Encounters:  
10/21/18 81 kg (178 lb 9.2 oz) Ht Readings from Last 1 Encounters:  
10/19/18 157.5 cm (62\") Body mass index is 32.66 kg/m². Plan:  Heparin 5000 units SQ q12h adjusted to 5000 units SQ q8h  since patient's renal function and wt warrant q8h interval  Per Nephrology consult,  ONEYDA resolving.   
 
Thank you, 
Marbella Del Valle, [facility]

## 2018-10-21 NOTE — PROGRESS NOTES
Hospital Progress Note NAME:  Loreta Espinoza :   1940 MRN:  [id number] Date/Time:  10/21/2018 11:12 AM 
 
Plan: 1. Continue insulin protocol 2. transition off insulin and transfer to floor 3.  diet Risk of Deterioration: Low  []           Moderate  [x]           High  [] Assessment:  
Principal Problem: 
  DKA (diabetic ketoacidoses) ([facility] (2017) -resolved Active Problems: 
  ONEYDA (acute kidney injury) ([facility] (10/20/2018) improved Vascular dementia without behavioral disturbance (10/20/2018) Lactic acidosis (10/20/2018)  resolved Hyperosmolar hyponatremia (10/20/2018) resolved Hyperkalemia (10/20/2018) resolved Hyperglycemia due to type 1 diabetes mellitus ([facility] (10/15/2018) Acute metabolic encephalopathy () resolving Admission History: 
Loreta Espinoza is a 69 y.o.  female with DM, HTN, and thyroid disease who presents via EMS for AMS. Patient unable to provide a history. Per ER documentation, patient noted twice today that her blood sugar was high. When she became altered, family called for EMS. ER evaluation is remarkable for ONEYDA, DKA, hyperosmolar state, hyponatremia and acidosis. Her K 7 and EKG with peak Ts. IV bicarb and IV insulin infusions were started. Hospitalist admitted for work up and evaluation of the above problems Subjective/Interium history:  
Feeling better - patient remains on insulin protocol with metabolic corrections complete 11 Point Review of Systems:  
Negative except [x]            Unable to obtain ROS due to:      
[x]            mental status change []            sedated []            intubated Social History Tobacco Use  Smoking status: Never Smoker  Smokeless tobacco: Never Used Substance Use Topics  Alcohol use: No  
  Comment: been years Medications reviewed: 
Current Facility-Administered Medications Medication Dose Route Frequency  mupirocin (BACTROBAN) 2 % ointment   Both Nostrils BID  influenza vaccine 2018- (6 mos+)(PF) (FLUARIX QUAD/FLULAVAL QUAD) injection 0.5 mL  0.5 mL IntraMUSCular PRIOR TO DISCHARGE  famotidine (PF) (PEPCID) 20 mg in sodium chloride 0.9% 10 mL injection  20 mg IntraVENous Q12H  
 dextrose 5% - 0.45% NaCl with KCl 20 mEq/L infusion  125 mL/hr IntraVENous CONTINUOUS  
 sodium chloride (NS) flush 5-10 mL  5-10 mL IntraVENous PRN  
 insulin regular (NOVOLIN R, HUMULIN R) 100 Units in 0.9% sodium chloride 100 mL infusion  0-50 Units/hr IntraVENous TITRATE  dextrose (D50W) injection syrg 12.5-25 g  25-50 mL IntraVENous PRN  
 acetaminophen (TYLENOL) suppository 650 mg  650 mg Rectal Q4H PRN  
 ondansetron (ZOFRAN) injection 4 mg  4 mg IntraVENous Q4H PRN  
 heparin (porcine) injection 5,000 Units  5,000 Units SubCUTAneous Q12H  levothyroxine (SYNTHROID) tablet 150 mcg  150 mcg Oral 6am  
  
 
Objective:  
Vitals: 
Visit Vitals /42 Pulse 84 Temp 98.4 °F (36.9 °C) Resp 19 Ht 5' 2\" (1.575 m) Wt 178 lb 9.2 oz (81 kg) SpO2 100% BMI 32.66 kg/m² Temp (24hrs), Av.5 °F (36.9 °C), Min:98.2 °F (36.8 °C), Max:98.8 °F (37.1 °C) O2 Device: Room air Last 24hr Input/Output: 
 
Intake/Output Summary (Last 24 hours) at 10/21/2018 5023 Last data filed at 10/21/2018 0600 Gross per 24 hour Intake 2483.19 ml Output 760 ml Net 1723.19 ml PHYSICAL EXAM: 
General:    Alert, cooperative, no distress, appears stated age. Head:   Normocephalic, without obvious abnormality, atraumatic. Eyes:   Conjunctivae/corneas clear. PERRLA Nose:  Nares normal. No drainage or sinus tenderness. Throat:    Lips, mucosa, and tongue normal.  No Thrush Neck:  Supple, symmetrical,  no adenopathy, thyroid: non tender 
  no carotid bruit and no JVD. Back:    Symmetric,  No CVA tenderness. Lungs:   Clear to auscultation bilaterally. No Wheezing or Rhonchi. No rales. Chest wall:  No tenderness or deformity. No Accessory muscle use. Heart:   Regular rate and rhythm,  no murmur, rub or gallop. Abdomen:   Soft, non-tender. Not distended. Bowel sounds normal. No masses Lab Data Reviewed: 
 
Recent Labs 10/21/18 
0025 10/19/18 
2056 WBC 11.9* 18.3* HGB 9.9* 11.3* HCT 28.8* 38.0  
 357 Recent Labs 10/21/18 
0693 10/21/18 
0025 10/20/18 
1512 10/20/18 
3084  10/19/18 
2056  139 139 144   < > 122* K 4.0 4.5 4.4 3.4*   < > 7.0*  
 103 101 107   < > 83* CO2 28 24 27 29   < > 5* * 308* 363* 81   < > 1,239* BUN 19 24* 27* 29*   < > 41* CREA 1.00 0.97 1.04* 1.15*   < > 1.99* CA 7.7* 7.8* 8.0* 8.8   < > 9.3 MG 1.9 1.9  --  2.1   < > 2.7* PHOS 2.2* 2.6  --  1.6*   < >  --   
ALB  --   --   --   --   --  3.8 TBILI  --   --   --   --   --  0.6 SGOT  --   --   --   --   --  27 ALT  --   --   --   --   --  31  
 < > = values in this interval not displayed. Lab Results Component Value Date/Time Glucose (POC) 147 (H) 10/21/2018 06:07 AM  
 Glucose (POC) 149 (H) 10/21/2018 04:41 AM  
 Glucose (POC) 179 (H) 10/21/2018 03:38 AM  
 Glucose (POC) 217 (H) 10/21/2018 02:31 AM  
 Glucose (POC) 277 (H) 10/21/2018 01:28 AM  
 
Recent Labs 10/19/18 
2146 PH 7.00* PCO2 17* PO2 107* HCO3 4*  
 
 
___________________________________________________ 
___________________________________________________ Attending Physician: Breanna Cruise, MD

## 2018-10-21 NOTE — PROGRESS NOTES
[facility], Critical Care, and Sleep Medicine Name: Dariel Valentino MRN: [id number] : 1940 Hospital: [place] [address] Date: 10/21/2018 IMPRESSION:  
IMPRESSION:  A 69-year-old woman with a history of diabetes, hypertension and hypothyroidism, was admitted with altered mentation, metabolic acidosis, hyperkalemia, renal insufficiency and hyperglycemia. The patient was diagnosed with diabetic ketoacidosis and received IV fluid resuscitation and IV insulin. Unfortunately, her mentation remains poor, but her metabolic acidosis and renal insufficiency are improving. to suggest active pneumonia.  
  
PLAN:   
1.  IV fluid per nephrology. 2.  IV insulin infusion->lantus 3.  restart home blood pressure medicines. 4.  GI and DVT prophylaxis. 5.  po nutrition · PLAN:  
·   
 
Subjective/Interval History:  
I have reviewed the flowsheet and previous days notes. ROS: 
 
Objective: 
Vital Signs:   
Visit Vitals /55 Pulse 84 Temp 97.6 °F (36.4 °C) Resp 17 Ht 5' 2\" (1.575 m) Wt 81 kg (178 lb 9.2 oz) SpO2 100% BMI 32.66 kg/m² O2 Device: Room air Temp (24hrs), Av.2 °F (36.8 °C), Min:97.6 °F (36.4 °C), Max:98.5 °F (36.9 °C) Intake/Output:  
Last shift:      10/21 07 - 10/21 1900 In: 125 [I.V.:125] Out: 60 [Urine:60] Last 3 shifts: 10/19 1901 - 10/21 07 In: 3156.3 [P.O.:480; I.V.:2676.3] Out: 3210 [VXZAA:5178] Intake/Output Summary (Last 24 hours) at 10/21/2018 1057 Last data filed at 10/21/2018 0800 Gross per 24 hour Intake 2733.41 ml Output 720 ml Net 2013.41 ml Physical Exam: 
 
General: []Ill appearing [] [] []No acute distress [] [] HEENT: []Icteric []PERRL []  
 []Anicteric Mucosa:[]moist   []dry [] Resp: []Wheeze []Clear to ascultation bilaterally []Accessory muscle use  
 []Rales []Chest tube:  []Air leak [] []Ronchi []Crepitus []  
[] CV: []Regular []Tachycardia [] []Irregular []Bradycardic []  
 []Murmur []S3 [] []Edema []S4 []  
  
GI: []Soft  []NG Tube Bowel sounds: []present []absent []Firm []PEG [] []Distended  []  
  
: []Ren []Hematuria [] []Clear urine []Edema [] MSK:  
 []SCDs []Edema [] []No deformities [] []  
  
Skin: []Warm []Dry  [] []Cool []Moist [] []Hot  []Diaphoretic [] []Rash  []Cyanosis [] N-psych: []Sedated  []Oriented []Agitated []Alert  Follows commands:  
[]Yes  []No [] Devices: []PICC []Trach []Chest tube: Air leak? []Y/[]N  
 []Central Line []PA Catheter [] DATA: 
Labs: 
Recent Labs 10/21/18 
0025 10/19/18 
2056 WBC 11.9* 18.3* HGB 9.9* 11.3* HCT 28.8* 38.0  
 357 Recent Labs 10/21/18 
8673 10/21/18 
0025 10/20/18 
1512 10/20/18 
7710  10/19/18 
2056  139 139 144   < > 122* K 4.0 4.5 4.4 3.4*   < > 7.0*  
 103 101 107   < > 83* CO2 28 24 27 29   < > 5* * 308* 363* 81   < > 1,239* BUN 19 24* 27* 29*   < > 41* CREA 1.00 0.97 1.04* 1.15*   < > 1.99* CA 7.7* 7.8* 8.0* 8.8   < > 9.3 MG 1.9 1.9  --  2.1   < > 2.7* PHOS 2.2* 2.6  --  1.6*   < >  --   
ALB  --   --   --   --   --  3.8 TBILI  --   --   --   --   --  0.6 SGOT  --   --   --   --   --  27 ALT  --   --   --   --   --  31  
 < > = values in this interval not displayed. Recent Labs 10/19/18 
2146 PH 7.00* PCO2 17* PO2 107* HCO3 4* Imaging: 
[]I have personally reviewed the patients radiographs []Radiographs reviewed with radiologist 
 []No change from prior, tubes and lines in adequate position []Improved   []Worsening Romie Oden MD

## 2018-10-21 NOTE — PROGRESS NOTES
1900  [facility] 
 
2013  Shift assessment complete, SEE ASSESSMENT. 0030  Labs drawn and sent. 3425  Reassessment complete, SEE ASSESSMENT. 0320 Reassessment complete, SEE ASSESSMENT. 6478 Labs drawn and sent.  
 
0700  Bedside report given to Tamie Alexander

## 2018-10-21 NOTE — PROGRESS NOTES
0715-Bedside and Verbal shift change report given to VIN Gutierrez (oncoming nurse) by Altagracia White RN (offgoing nurse). Report included the following information SBAR, Kardex, ED Summary, Procedure Summary, Intake/Output, MAR, Recent Results and Cardiac Rhythm NSR.  
 
1110-Received orders from Dr. Padilla Rehman to transition off of Insulin drip. Lantus to be given and then drip will be discontinued 2 hours later. He also verbalized that CL and Ren could be discontinued and patient transferred out to [facility] this afternoon if sugars remain stable off drip this afternoon. 1445-Ren and CL removed. Patient received Medical transfer orders. 1510-TRANSFER - OUT REPORT: 
 
Verbal report given to Annie Jarrett RN (name) on Rosi Garvey  being transferred to Medical (unit) for routine progression of care Report consisted of patients Situation, Background, Assessment and  
Recommendations(SBAR). Information from the following report(s) SBAR, Kardex, ED Summary, Procedure Summary, Intake/Output, MAR, Recent Results and Cardiac Rhythm NSR was reviewed with the receiving nurse. Lines:  
Peripheral IV 10/19/18 Right Wrist (Active) Site Assessment Clean, dry, & intact 10/21/2018 11:44 AM  
Phlebitis Assessment 0 10/21/2018 11:44 AM  
Infiltration Assessment 0 10/21/2018 11:44 AM  
Dressing Status Clean, dry, & intact 10/21/2018 11:44 AM  
Dressing Type Transparent 10/21/2018 11:44 AM  
Hub Color/Line Status Pink;Capped 10/21/2018 11:44 AM  
Action Taken Blood drawn 10/19/2018  9:02 PM  
Alcohol Cap Used Yes 10/21/2018  3:20 AM  
  
 
Opportunity for questions and clarification was provided. Patient transported with: 
 Patient-specific medications from Pharmacy Registered Nurse

## 2018-10-22 LAB
GLUCOSE BLD STRIP.AUTO-MCNC: 135 MG/DL (ref 65–100)
GLUCOSE BLD STRIP.AUTO-MCNC: 143 MG/DL (ref 65–100)
GLUCOSE BLD STRIP.AUTO-MCNC: 179 MG/DL (ref 65–100)
GLUCOSE BLD STRIP.AUTO-MCNC: 209 MG/DL (ref 65–100)
GLUCOSE BLD STRIP.AUTO-MCNC: 82 MG/DL (ref 65–100)
PHOSPHATE SERPL-MCNC: 2.9 MG/DL (ref 2.6–4.7)
SERVICE CMNT-IMP: ABNORMAL
SERVICE CMNT-IMP: NORMAL

## 2018-10-22 PROCEDURE — 65270000015 HC RM PRIVATE ONCOLOGY

## 2018-10-22 PROCEDURE — 36415 COLL VENOUS BLD VENIPUNCTURE: CPT | Performed by: INTERNAL MEDICINE

## 2018-10-22 PROCEDURE — 82962 GLUCOSE BLOOD TEST: CPT

## 2018-10-22 PROCEDURE — 84100 ASSAY OF PHOSPHORUS: CPT | Performed by: INTERNAL MEDICINE

## 2018-10-22 PROCEDURE — 74011250637 HC RX REV CODE- 250/637: Performed by: INTERNAL MEDICINE

## 2018-10-22 PROCEDURE — 74011250636 HC RX REV CODE- 250/636: Performed by: INTERNAL MEDICINE

## 2018-10-22 PROCEDURE — 74011636637 HC RX REV CODE- 636/637: Performed by: INTERNAL MEDICINE

## 2018-10-22 RX ADMIN — HEPARIN SODIUM 5000 UNITS: 5000 INJECTION INTRAVENOUS; SUBCUTANEOUS at 14:08

## 2018-10-22 RX ADMIN — MUPIROCIN: 20 OINTMENT TOPICAL at 08:58

## 2018-10-22 RX ADMIN — INSULIN GLARGINE 30 UNITS: 100 INJECTION, SOLUTION SUBCUTANEOUS at 09:00

## 2018-10-22 RX ADMIN — HEPARIN SODIUM 5000 UNITS: 5000 INJECTION INTRAVENOUS; SUBCUTANEOUS at 05:32

## 2018-10-22 RX ADMIN — HEPARIN SODIUM 5000 UNITS: 5000 INJECTION INTRAVENOUS; SUBCUTANEOUS at 22:31

## 2018-10-22 RX ADMIN — MUPIROCIN: 20 OINTMENT TOPICAL at 22:31

## 2018-10-22 RX ADMIN — Medication 10 ML: at 14:08

## 2018-10-22 RX ADMIN — DEXTROSE MONOHYDRATE, SODIUM CHLORIDE, AND POTASSIUM CHLORIDE 50 ML/HR: 50; 4.5; 1.49 INJECTION, SOLUTION INTRAVENOUS at 02:50

## 2018-10-22 RX ADMIN — Medication 10 ML: at 05:18

## 2018-10-22 RX ADMIN — LEVOTHYROXINE SODIUM 150 MCG: 150 TABLET ORAL at 05:32

## 2018-10-22 RX ADMIN — Medication 10 ML: at 22:31

## 2018-10-22 NOTE — PROGRESS NOTES
If appropriate, please resume prior to admission medications: 
 
Amlodipine Alphagan eye Drops Clopidogrel Fenofibrate Pravastatin

## 2018-10-22 NOTE — PROGRESS NOTES
Problem: Falls - Risk of 
Goal: *Absence of Falls Document Melany Mcmillans Fall Risk and appropriate interventions in the flowsheet. Outcome: Progressing Towards Goal 
Fall Risk Interventions: 
Mobility Interventions: Assess mobility with egress test, Communicate number of staff needed for ambulation/transfer, OT consult for ADLs, Patient to call before getting OOB, PT Consult for mobility concerns, PT Consult for assist device competence Medication Interventions: Assess postural VS orthostatic hypotension, Bed/chair exit alarm, Evaluate medications/consider consulting pharmacy, Patient to call before getting OOB, Teach patient to arise slowly Elimination Interventions: Bed/chair exit alarm, Call light in reach, Toileting schedule/hourly rounds, Patient to call for help with toileting needs History of Falls Interventions: Bed/chair exit alarm, Consult care management for discharge planning, Door open when patient unattended

## 2018-10-22 NOTE — CDMP QUERY
Account Number: [de-identified] MRN: [id number] Patient: Sindy Grider Created: 2018-10-22T09:58:00 Clinician Name: João More MD : 
Please clarify if this patient is (was) being treated/managed for:  
 
=> SIRS of non-infectious origin with acute organ dysfunction as evidenced by wbc 18--11, bands 1, lactic 10, hr 107--158, rr  27--35, jeffy, Encephalopathy  req NS2L, IV Bicarb, insulin, Ct head, Nephro cx 
=> Other explanation of clinical findings 
=> Clinically Undetermined (no explanation for clinical findings) The medical record reflects the following clinical findings, treatment, and risk factors. Risk Factors:  77f adm w/ Hyperosmolar hyperglycemia with DKA Clinical Indicators:  wbc 18--11, bands 1, lactic 10, hr 107--158, rr  27--35, H&P-- Jeffy, Encephalopathy Treatment: NS2L, IV Bicarb, insulin, Ct head, Nephro cx Please clarify and document your clinical opinion in the progress notes and discharge summary including the definitive and/or presumptive diagnosis, (suspected or probable), related to the above clinical findings. Please include clinical findings supporting your diagnosis. Thank You, João Carey Rn/Kalia

## 2018-10-22 NOTE — PROGRESS NOTES
[facility] 10/3-10/9/2018:  Diabetic Ketoacidosis Retro-entry Goals Addressed This Visit's Progress  Takes Medications as prescribed   Not on track 10/15/2018:  Goal:  Consult done w/ PCP; Patient's daughter Everett People agreed to  meds today post ALEKSANDAR visit. EW 
 
10/18/2018:  Noeena Finnegan 10/3-10/9/2018:  Diabetic Ketoacidosis f/u:  NN received call from patient's daughter stating medication was at Pharmacy for pickup. Consult done w/ PCP for med. Inquiry and teachings done on reasons for DKA; daughter states she did not understand what patient could have eaten. Discussion regarding night snacks advised by PCP, however, patient was admitted to ED @ 1558. Discussion occurred re patient needing supervision when eating snacks and other meals as well. Discussion occurred on who does shopping, that no sugary foods nor beverages should be left around for patient to eat. Beverages should be replaced with water. Discussed patient's WBC 15.6  & A1c 10+. UTI teachings done. Will continue to f/u.  EW 
 
  
  
 
 
Goal completion:  Ongoing.

## 2018-10-22 NOTE — PROGRESS NOTES
Oncology Nursing Communication Tool 7:13 PM 
10/22/2018 Bedside shift change report given to Natan Roger RN (incoming nurse) by Ct Reed (outgoing nurse) on [place]. Report included the following information Shift Summary: sliding scale discontinued, awaiting diabetes educator to complete education, physician plans for d/c tomorrow, fluids decreased to 25 ml/hr Issues for physician to address: discharge, home health to help manage diabetes? Oncology Shift Note Admission Date 10/19/2018 Admission Diagnosis DKA (diabetic ketoacidoses) ([facility] [address] Code Status Full Code Consults IP CONSULT TO NEPHROLOGY Cardiac Monitoring [] Yes [x] No  
  
Purposeful Hourly Rounding [x] Yes   
Tyler Score Total Score: 3 Tyler score 3 or > [] Bed Alarm [] Avasys [] 1:1 sitter [] Patient refused (Place signed refusal form in chart) Pain Managed [x] Yes [] No  
 Key Pain Meds The patient is on no pain meds. Influenza Vaccine Received Flu Vaccine for Current Season (usually Sept-March): No  
 Patient/Guardian Refused (Notify MD): No  
  
Oxygen needs? [x] Room air Oxygen @  []1L    []2L    []3L   []4L    []5L   []6L Use home O2? [] Yes [] No 
Perform O2 challenge test using  smartphrase (.oxygenchallenge) Last bowel movement Last BM: 10/21/18 
 
bowel movement Urinary Catheter [REMOVED] Urinary Catheter 10/20/18 Ren-Indications for Use: Accurate measurement of urinary output [REMOVED] Urinary Catheter 10/20/18 Ren-Urine Output (mL): 40 ml LDAs Peripheral IV 10/19/18 Right Wrist (Active) Site Assessment Clean, dry, & intact 10/22/2018  3:08 PM  
Phlebitis Assessment 0 10/22/2018  3:08 PM  
Infiltration Assessment 0 10/22/2018  3:08 PM  
Dressing Status Clean, dry, & intact 10/22/2018  3:08 PM  
Dressing Type Tape;Transparent 10/22/2018  3:08 PM  
Hub Color/Line Status Pink; Infusing 10/22/2018  3:08 PM  
 Action Taken Blood drawn 10/19/2018  9:02 PM  
Alcohol Cap Used Yes 10/21/2018  3:20 AM  
                  
  
Readmission Risk Assessment Tool Score High Risk   
      
 30 Total Score 3 Has Seen PCP in Last 6 Months (Yes=3, No=0)  
 4 IP Visits Last 12 Months (1-3=4, 4=9, >4=11) 5 Pt. Coverage (Medicare=5 , Medicaid, or Self-Pay=4) 18 Charlson Comorbidity Score (Age + Comorbid Conditions) Criteria that do not apply:  
 . Living with Significant Other. Assisted Living. LTAC. SNF. or  
Rehab Patient Length of Stay (>5 days = 3) Expected Length of Stay 3d 21h Actual Length of Stay 3 Xiao Ramires

## 2018-10-22 NOTE — PROGRESS NOTES
Reason for Readmission:     DKA RRAT Score and Risk Level:     30 Level of Readmission:    Level 2 - Previous admission 10/3/2018 to 10/9/2018 Care Conference scheduled:   Care Conference with patient, CM, nursing and [facility] Resources/supports as identified by patient/family:   Home Health and family Top Challenges facing patient (as identified by patient/family and CM): Finances/Medication cost?     [employer] - no concerns about finances Transportation     Family provides all transportation Support system or lack thereof? Good family support Living arrangements? Private home w/ her 2 adult sons - one works, but is home evenings and the other has special needs, but assists patient as needed. Patient has another adult son and daughter that also live close by and assist with care Self-care/ADLs/Cognition? Patient states she is independent with all ADL's - including cooking daily, but does not drive. Current Advanced Directive/Advance Care Plan:  No current ACP and patient is not willing to discuss at present time. Plan for utilizing home health:   Patient would like to resume home health with [address] as she has had them previously. Likelihood of additional readmission:   High 
          
Transition of Care Plan:    Based on readmission, the patient's previous Plan of Care - Home with [address], Dr. Leesa Najera and Family support 
 has been evaluated and/or modified. The current Transition of Care Plan is:        
Patient is 68 yo female with Hx of  DM, HTN and thyroid disease who presented to ED with AMS. Per Dr. Jaida Koch, patient has been difficult to regulate with her DM despite being followed by Home Health. Patient would like to resume home health with [facility] at discharge. CM will need resumption of care order. Patient plans to return home with family and home health - no anticipated concerns. Care Management Interventions PCP Verified by CM: Yes(Dr. Marcelino Mclain) Transition of Care Consult (CM Consult): Other(Initial CM Assessment - Readsmission review) MyChart Signup: No 
Discharge Durable Medical Equipment: No(Patient denies any current DME at home - states she is independent with all ADL's) Physical Therapy Consult: No 
Occupational Therapy Consult: No 
Speech Therapy Consult: No 
Current Support Network: Own Home, Family Lives Nearby(Patient lives with 2 sons (one w/ special needs that helps her at home)) Confirm Follow Up Transport: Family Plan discussed with Pt/Family/Caregiver: Yes(Patient alert and oriented x 3 - provided all interview information) Freedom of Choice Offered: Yes 
 
Marcin Gilliam RN, BSN, ACM  - Medical Oncology [phone]

## 2018-10-22 NOTE — DIABETES MGMT
DTC progress note Recommendations/ Comments:  Pt no longer on insulin gtt as HHS has resolved per MD note. Pt with BG of 82 mg/dL this am and 179 mg/dL pre-lunch. Please consider reducing Lantus to 20 units and adding humalog 4 units ac tid (if eating 50% or more of meals) to aid in controlling day time BG rises with po intake if. Please consider adding lispro correction ac & hs. Current hospital DM medication: lantus 30 units daily Chart reviewed and initial evaluation complete on Deana Chase. Patient is a 69 y.o. female with known Type 2 Diabetes on levemir 30 units daily at home. A1c:  
Lab Results Component Value Date/Time Hemoglobin A1c 10.3 (H) 10/20/2018 02:46 AM  
 
 
Recent Glucose Results:  
Lab Results Component Value Date/Time GLUCPOC 143 (H) 10/22/2018 11:56 AM  
 GLUCPOC 179 (H) 10/22/2018 11:09 AM  
 GLUCPOC 82 10/22/2018 07:37 AM  
  
 
Lab Results Component Value Date/Time Creatinine 1.00 10/21/2018 06:08 AM  
 
 
Active Orders Diet DIET DIABETIC CONSISTENT CARB Regular PO intake:  
Patient Vitals for the past 72 hrs: 
 % Diet Eaten 10/20/18 1600 75 % Thank you. Shlomo Martins [facility]

## 2018-10-22 NOTE — PROGRESS NOTES
General Daily Progress Note Admit Date: 10/19/2018 Subjective:  
 
Patient has no complaint . Current Facility-Administered Medications Medication Dose Route Frequency  heparin (porcine) injection 5,000 Units  5,000 Units SubCUTAneous Q8H  
 insulin glargine (LANTUS) injection 30 Units  30 Units SubCUTAneous DAILY  glucose chewable tablet 16 g  4 Tab Oral PRN  
 dextrose (D50W) injection syrg 12.5-25 g  12.5-25 g IntraVENous PRN  
 glucagon (GLUCAGEN) injection 1 mg  1 mg IntraMUSCular PRN  
 mupirocin (BACTROBAN) 2 % ointment   Both Nostrils BID  influenza vaccine 2018-19 (6 mos+)(PF) (FLUARIX QUAD/FLULAVAL QUAD) injection 0.5 mL  0.5 mL IntraMUSCular PRIOR TO DISCHARGE  sodium chloride (NS) flush 5-10 mL  5-10 mL IntraVENous PRN  
 acetaminophen (TYLENOL) suppository 650 mg  650 mg Rectal Q4H PRN  
 ondansetron (ZOFRAN) injection 4 mg  4 mg IntraVENous Q4H PRN  
 levothyroxine (SYNTHROID) tablet 150 mcg  150 mcg Oral 6am  
  
 
Review of Systems A comprehensive review of systems was negative. Objective:  
 
Patient Vitals for the past 24 hrs: 
 BP Temp Pulse Resp SpO2  
10/22/18 0800 149/77 98.3 °F (36.8 °C) 74 16 95 % 10/21/18 1917 161/58 97.9 °F (36.6 °C) 81 12 100 % 10/21/18 1527 136/56 98.5 °F (36.9 °C) 84 12 100 % 10/21/18 1400 119/45  80 8 100 % 10/21/18 1300 115/48  77 23 100 % 10/21/18 1200 128/50  83 19 100 % 10/21/18 1144  97.9 °F (36.6 °C)     
10/21/18 1100 131/46  85 15 100 % 10/21/18 1000 120/59  87 22 100 % 10/21/18 0900 132/55  84 17 100 % No intake/output data recorded. 10/20 1901 - 10/22 0700 In: 3292.1 [P.O.:480; I.V.:2812.1] Out: 525 [Urine:525] Physical Exam:  
Visit Vitals /77 (BP 1 Location: Left arm, BP Patient Position: At rest) Pulse 74 Temp 98.3 °F (36.8 °C) Resp 16 Ht 5' 2\" (1.575 m) Wt 178 lb 9.2 oz (81 kg) SpO2 95% BMI 32.66 kg/m² General appearance: alert, cooperative, no distress, appears stated age Neck: supple, symmetrical, trachea midline, no adenopathy, thyroid: not enlarged, symmetric, no tenderness/mass/nodules, no carotid bruit and no JVD Lungs: clear to auscultation bilaterally Heart: regular rate and rhythm, S1, S2 normal, no murmur, click, rub or gallop Abdomen: soft, non-tender. Bowel sounds normal. No masses,  no organomegaly Extremities: extremities normal, atraumatic, no cyanosis or edema Neurologic: Grossly normal 
   
 
Data Review Recent Results (from the past 24 hour(s)) GLUCOSE, POC Collection Time: 10/21/18  8:46 AM  
Result Value Ref Range Glucose (POC) 279 (H) 65 - 100 mg/dL Performed by Bretta Port Dickinson GLUCOSE, POC Collection Time: 10/21/18  9:49 AM  
Result Value Ref Range Glucose (POC) 346 (H) 65 - 100 mg/dL Performed by Bretta Port Dickinson GLUCOSE, POC Collection Time: 10/21/18 11:03 AM  
Result Value Ref Range Glucose (POC) 265 (H) 65 - 100 mg/dL Performed by Bretta Cristiane GLUCOSE, POC Collection Time: 10/21/18 12:13 PM  
Result Value Ref Range Glucose (POC) 213 (H) 65 - 100 mg/dL Performed by Bretta Cristiane GLUCOSE, POC Collection Time: 10/21/18  1:16 PM  
Result Value Ref Range Glucose (POC) 181 (H) 65 - 100 mg/dL Performed by Bretta Port Dickinson GLUCOSE, POC Collection Time: 10/21/18  2:28 PM  
Result Value Ref Range Glucose (POC) 133 (H) 65 - 100 mg/dL Performed by Bretta Cristiane GLUCOSE, POC Collection Time: 10/21/18  4:37 PM  
Result Value Ref Range Glucose (POC) 92 65 - 100 mg/dL Performed by Gabriel Vogel GLUCOSE, POC Collection Time: 10/21/18 10:21 PM  
Result Value Ref Range Glucose (POC) 219 (H) 65 - 100 mg/dL Performed by Jan Izquierdo GLUCOSE, POC Collection Time: 10/21/18 11:48 PM  
Result Value Ref Range Glucose (POC) 182 (H) 65 - 100 mg/dL Performed by Ronak De La Torre (PCT) PHOSPHORUS  
 Collection Time: 10/22/18  5:09 AM  
Result Value Ref Range Phosphorus 2.9 2.6 - 4.7 MG/DL  
GLUCOSE, POC Collection Time: 10/22/18  7:37 AM  
Result Value Ref Range Glucose (POC) 82 65 - 100 mg/dL Performed by Grace Mobley (PCT) Assessment:  
 
Principal Problem: 
  DKA (diabetic ketoacidoses) ([facility] [address] (6/2/2017) Active Problems: Hyperglycemia due to type 1 diabetes mellitus ([address] (10/15/2018) Lactic acidosis (10/20/2018) Hyperosmolar hyponatremia (10/20/2018) Hyperkalemia (10/20/2018) ONEYDA (acute kidney injury) ([facility] [address] (10/20/2018) Acute metabolic encephalopathy (87/98/1758) Vascular dementia without behavioral disturbance (10/20/2018) Plan: 1. Recurrent ketoacidosis now resolved. I will change her Levemir to [place]. Hopefully the long acting basal insulin will reduce the likelihood of a recurrence. Must also watch for nocturnal hypoglycemia which has been a recurring problem for patient. She is completely back to baseline currently. 2.  Hydration is adequate. 3.  Continue to monitor blood sugars today and if acceptable discharge in a.m.

## 2018-10-23 ENCOUNTER — HOME HEALTH ADMISSION (OUTPATIENT)
Dept: HOME HEALTH SERVICES | Facility: HOME HEALTH | Age: 78
End: 2018-10-23
Payer: MEDICARE

## 2018-10-23 VITALS
DIASTOLIC BLOOD PRESSURE: 65 MMHG | OXYGEN SATURATION: 96 % | WEIGHT: 178.57 LBS | SYSTOLIC BLOOD PRESSURE: 144 MMHG | BODY MASS INDEX: 32.86 KG/M2 | TEMPERATURE: 98.2 F | HEART RATE: 73 BPM | HEIGHT: 62 IN | RESPIRATION RATE: 18 BRPM

## 2018-10-23 LAB
ANION GAP SERPL CALC-SCNC: 4 MMOL/L (ref 5–15)
BUN SERPL-MCNC: 11 MG/DL (ref 6–20)
BUN/CREAT SERPL: 18 (ref 12–20)
CALCIUM SERPL-MCNC: 8.2 MG/DL (ref 8.5–10.1)
CHLORIDE SERPL-SCNC: 108 MMOL/L (ref 97–108)
CO2 SERPL-SCNC: 28 MMOL/L (ref 21–32)
CREAT SERPL-MCNC: 0.6 MG/DL (ref 0.55–1.02)
GLUCOSE BLD STRIP.AUTO-MCNC: 131 MG/DL (ref 65–100)
GLUCOSE BLD STRIP.AUTO-MCNC: 71 MG/DL (ref 65–100)
GLUCOSE SERPL-MCNC: 71 MG/DL (ref 65–100)
POTASSIUM SERPL-SCNC: 4 MMOL/L (ref 3.5–5.1)
SERVICE CMNT-IMP: ABNORMAL
SERVICE CMNT-IMP: NORMAL
SODIUM SERPL-SCNC: 140 MMOL/L (ref 136–145)

## 2018-10-23 PROCEDURE — 80048 BASIC METABOLIC PNL TOTAL CA: CPT | Performed by: INTERNAL MEDICINE

## 2018-10-23 PROCEDURE — 74011636637 HC RX REV CODE- 636/637: Performed by: INTERNAL MEDICINE

## 2018-10-23 PROCEDURE — 74011250636 HC RX REV CODE- 250/636: Performed by: INTERNAL MEDICINE

## 2018-10-23 PROCEDURE — 36415 COLL VENOUS BLD VENIPUNCTURE: CPT | Performed by: INTERNAL MEDICINE

## 2018-10-23 PROCEDURE — 82962 GLUCOSE BLOOD TEST: CPT

## 2018-10-23 PROCEDURE — 74011250637 HC RX REV CODE- 250/637: Performed by: INTERNAL MEDICINE

## 2018-10-23 RX ORDER — INSULIN DEGLUDEC 100 U/ML
INJECTION, SOLUTION SUBCUTANEOUS
Qty: 3 ADJUSTABLE DOSE PRE-FILLED PEN SYRINGE | Refills: 11 | Status: SHIPPED
Start: 2018-10-23 | End: 2018-11-06 | Stop reason: DRUGHIGH

## 2018-10-23 RX ORDER — ENOXAPARIN SODIUM 100 MG/ML
40 INJECTION SUBCUTANEOUS EVERY 24 HOURS
Status: DISCONTINUED | OUTPATIENT
Start: 2018-10-23 | End: 2018-10-23 | Stop reason: HOSPADM

## 2018-10-23 RX ADMIN — MUPIROCIN: 20 OINTMENT TOPICAL at 08:00

## 2018-10-23 RX ADMIN — HEPARIN SODIUM 5000 UNITS: 5000 INJECTION INTRAVENOUS; SUBCUTANEOUS at 06:21

## 2018-10-23 RX ADMIN — INSULIN GLARGINE 30 UNITS: 100 INJECTION, SOLUTION SUBCUTANEOUS at 09:00

## 2018-10-23 RX ADMIN — LEVOTHYROXINE SODIUM 150 MCG: 150 TABLET ORAL at 06:21

## 2018-10-23 RX ADMIN — Medication 10 ML: at 06:21

## 2018-10-23 NOTE — PROGRESS NOTES
Anticoagulation Dosing Indication:  DVT ppx Estimated Creatinine Clearance: 78.7 mL/min (based on SCr of 0.6 mg/dL). Estimated Creatinine Clearance (using IBW):63.1 mL/min Recent Labs 10/23/18 
5489 10/21/18 
7523 10/21/18 
0025 10/20/18 
1512 CREA 0.60 1.00 0.97 1.04* HGB  --   --  9.9*  --   
HCT  --   --  28.8*  --   
PLT  --   --  254  -- Wt Readings from Last 1 Encounters:  
10/21/18 81 kg (178 lb 9.2 oz) Ht Readings from Last 1 Encounters:  
10/19/18 157.5 cm (62\") Body mass index is 32.66 kg/m². Plan:  Heparin 5000 units SQ q8h adjusted to Lovenox 40mg SQ q24h to minimize the number of SQ anticoagulation injections per 24 hours and thus assist with improved patient satisfaction.   
 
Thank you, 
Sabiha Ramos, [facility]

## 2018-10-23 NOTE — PROGRESS NOTES
Patient being discharged home today Follow up appointments reviewed Home Health to resume post discharge Opportunity for questions and clarification provided New prescription for Tresiba sent to [facility] pharmacy along with other medications.

## 2018-10-23 NOTE — PROGRESS NOTES
HYPOGLYCEMIC EPISODE DOCUMENTATION Patient with hypoglycemic episode(s) at 0740(time) on 10/23/18(date). BG value(s) pre-treatment 71 Was patient symptomatic? [] yes, [x] no Patient was treated with the following rescue medications/treatments: [] D50 [] Glucose tablets 
              [] Glucagon 
              [x] 4oz juice 
              [] 6oz reg soda 
              [] 8oz low fat milk BG value post-treatment: 131 Once BG treated and value greater than 80mg/dl, pt was provided with the following: 
[] snack [x] meal 
Name of MD notified: Radha Li

## 2018-10-23 NOTE — PROGRESS NOTES
Patient has discharge order from Dr. Iglesia Rodas - patient plans to go home with family - she lives in her own home with 2 adult sons (one son with special needs, but patient states \"he helps me\"). Patient has adult son and daughter that live in their own homes close to patient that check on her and assist her with all transportation. Patient offered [facility] and will resume home health with [address] [place] [address] Road - referral sent via CC. In-basket message sent to  - Analilia Claire - notifying of discharge. 2nd IM letter provided to patient by CM Specialist per chart review. Care Management Interventions PCP Verified by CM: Yes(Dr. Yash Donald) Mode of Transport at Discharge: Other (see comment)(Family) Transition of Care Consult (CM Consult): Home Health [address] [place] [address] Road: Yes MyChart Signup: No 
Discharge Durable Medical Equipment: No(Patient denies any current DME at home - states she is independent with all ADL's) Physical Therapy Consult: No 
Occupational Therapy Consult: No 
Speech Therapy Consult: No 
Current Support Network: Own Home, Other, Family Lives Nearby(Lives with 2 sons and daughter and another son live close by and check regularly) Confirm Follow Up Transport: Family Plan discussed with Pt/Family/Caregiver: Yes Freedom of Choice Offered: Yes(FOC offered for home health - resumption of care with [facility]) Discharge Location Discharge Placement: Home with home health([facility]) Quinn Carlson, RN, BSN, ACM  - Medical Oncology [phone]

## 2018-10-23 NOTE — DISCHARGE INSTRUCTIONS
General Discharge Instructions    Patient ID:  Evangelina Collins  [id number]  69 y.o.  1940    Patient Instructions        The following personal items were collected during your admission and were returned to you. Take Home Medications           What to do at Home    Recommended diet: Diabetic Diet with nightly snack of at least 350 calories-------must eat all meals at same time every day-basically every 4 hours    Recommended activity: Activity as tolerated    Follow-up with Jeaneth Jurado MD  in 5 days. Information obtained by :  I understand that if any problems occur once I am at home I am to contact my physician. I understand and acknowledge receipt of the instructions indicated above.                                                                                                                                            Physician's or R.N.'s Signature                                                                  Date/Time                                                                                                                                              Patient or Representative Signature                                                          Date/Time

## 2018-10-24 ENCOUNTER — PATIENT OUTREACH (OUTPATIENT)
Dept: INTERNAL MEDICINE CLINIC | Age: 78
End: 2018-10-24

## 2018-10-24 NOTE — DISCHARGE SUMMARY
[facility] [address]    Karen Berry  MR#: [id number]  : 1940  ACCOUNT #: [de-identified]   ADMIT DATE: 10/19/2018  DISCHARGE DATE: 10/23/2018    HISTORY OF PRESENT ILLNESS:  The patient is a 69-year-old lady who is a type 1B diabetic who presented to the emergency room because of increasing lethargy and confusion. She was evaluated and found to be in diabetic ketoacidosis. Antecedent history could not be obtained from patient and/or family members other than the fact that her blood sugar was elevated today. This is the second episode of question of diabetic ketoacidosis that she has had within a month's time. Apparently, she is compliant with her medication, but overall control is quite poor because of noncompliance. PAST MEDICAL HISTORY, SOCIAL HISTORY, REVIEW OF SYSTEMS, FAMILY HISTORY, PHYSICAL EXAMINATION:  As in the admitting H and P.    LABORATORY VALUES:  Initial hemoglobin was 11.3, white count 18.3, MCV was 105.8 platelet count 046 K with the following differential:  79 segs, 1 band, 6 lymphocytes, 11 monocytes. On 10/21/2018 white count was 11.9, hemoglobin is 9.9. Abnormalities on the comprehensive profile on admission, noted a BUN and creatinine of 31 and 1.99 with a CO2 of 5. On 10/20/2018 a BUN and creatinine were 34 and 1.78 with a CO2 13. At the time of discharge, BUN and creatinine were 11 and 0.60 with a CO2 of 28. RADIOGRAPHS:  Chest x-ray revealed mild bibasilar atelectasis. HOSPITAL COURSE:  Patient was admitted and placed in intensive care unit where she was placed on an insulin drip with every 4 hours labs assessing a CO2, potassium, BUN and creatinine. CO2 progressively increased to the point where it was normal with a simultaneous normalization of her blood sugar. Her mental status gradually improved to the point she was back to baseline within 48-72 hour period.     She continued to convalesce on the floor with blood sugars that were actually a bit more reasonable than previously. Again, no short-acting insulin was used in the convalescent phase, only basal because of a propensity to develop hypoglycemia, particularly evident nocturnally. There was no obvious precipitating factor leading to this. I therefore decided to resume her Thurl Fix in view of its long-acting nature, which should help reduce the probability of ketoacidosis. Ideally, it should also minimize hypoglycemia as long as the patient was compliant with her diet, especially her bedtime snack. FINAL DIAGNOSES:  1. Diabetic ketoacidosis. 2.  Diabetes mellitus type 1B. 3.  Memory deficits. 4.  Primary hypertension. 5.  Dyslipidemia. 6.  Hypothyroidism. DISPOSITION:  The patient will be discharged home ambulatory on an ADA diet with bedtime snack. Basically, the emphasis is again placed on eating at the same time every day, which means every 4 hours for breakfast, lunch and dinner. Three to 4 hours later, she is to eat a bedtime snack and this has to be a minimum of 360 calories. It also has to include protein. DISCHARGE MEDICATIONS:  Include the following:  Amlodipine 10 mg daily, Alphagan 0.15% 1 drop both eyes b.i.d., clopidogrel 75 mg daily, Tresiba 24 units q.a.m. to be titrated up as an outpatient Synthroid 0.15 mg daily, losartan/chlorthalidone 100/25 q.a.m., Pravastatin 80 mg at bedtime. DISPOSITION:  Home, the patient to return to the office in the next 5 days. CHIDI Schmitz MD       LAB/SN  D: 10/23/2018 09:49     T: 10/24/2018 08:26  JOB #: [id number]

## 2018-10-24 NOTE — PROGRESS NOTES
[facility] 10/19-10/23/2018:  Diabetic Ketoacidosis Egg, potatoes & plata & toast this AM.   Hospital Discharge Follow-Up Date/Time:  10/24/2018 11:11 PM 
 
Patient was admitted to [facility] on 10/19 and discharged on 10/23/2018 for DKA. The physician discharge summary was available at the time of outreach. Patient was contacted within 2 business days of discharge. Top Challenges reviewed with the provider Medications consult done 10/23/2018 Method of communication with provider:  none Inpatient RRAT score: 30 Was this a readmission? yes Patient stated reason for the readmission: didn't know why. Nurse Navigator (NN) contacted the patient & son Justin Eller by telephone to perform post hospital discharge assessment. Verified name and  with patient as identifiers. Provided introduction to self, and explanation of the Nurse Navigator role. Reviewed discharge instructions and red flags with patient who verbalized understanding. Patient given an opportunity to ask questions and does not have any further questions or concerns at this time. The patient agrees to contact the PCP office for questions related to their healthcare. NN provided contact information for future reference. Disease Specific:   Diabetes Summary of patient's top problems: 1. Patient needs ongoing disease management teachings & medication management. Home Health orders at discharge:  [facility] company: [id number] Date of initial visit: 10/24/2018 Durable Medical Equipment ordered/company: none Durable Medical Equipment received: none Barriers to care? Med Management & Meal Preparations Advance Care Planning:  
Does patient have an Advance Directive:  not on file; education provided Medication(s):  
New Medications at Discharge: none Changed Medications at Discharge: none: Jakob De León 24u remains the same-patient acknowledged. Discontinued Medications at Discharge: none DISPOSITION Per D/C MD:  discussed with son Aimee Akins:  ADA diet with bedtime snack. Basically, the emphasis is again placed on eating at the same time every day, which means every 4 hours for breakfast, lunch and dinner. Three to 4 hours later, she is to eat a bedtime snack and this has to be a minimum of 360 calories. It also has to include protein. 
  
Medication reconciliation was performed with patient, who verbalizes understanding of administration of home medications. There were no barriers to obtaining medications identified at this time. Referral to Pharm D needed: no  
 
Current Outpatient Medications Medication Sig  
 insulin degludec (TRESIBA FLEXTOUCH U-100) 100 unit/mL (3 mL) inpn Take 24 units every morning  pravastatin (PRAVACHOL) 80 mg tablet Take 1 Tab by mouth daily.  levothyroxine (SYNTHROID) 150 mcg tablet TAKE 1 TABLET BY MOUTH EVERY DAY  amLODIPine (NORVASC) 10 mg tablet TAKE 1 TABLET BY MOUTH EVERY MORNING  
 clopidogrel (PLAVIX) 75 mg tab TAKE 1 TAB BY MOUTH DAILY.  losartan-hydroCHLOROthiazide (HYZAAR) 100-25 mg per tablet TAKE 1 TABLET BY MOUTH DAILY  brimonidine (ALPHAGAN) 0.15 % ophthalmic solution Administer 1 Drop to both eyes two (2) times a day. No current facility-administered medications for this visit. There are no discontinued medications. [facility] follow up appointment(s):  
Future Appointments Date Time Provider Tawnya Evans 10/30/2018  1:15 PM Britney Quesada MD [address]  
11/5/2018  1:00 PM Britney Quesada MD [address] [place] [address] [facility] follow up appointment(s): [facility]  [facility]  
Goals Addressed This Visit's Progress  Knowledge and adherence of medication (ie. action, side effects, missed dose, etc.) Patient states taking insulin as ordered. Daughter states insulin is being taken.   EW  
 
 
  
  Patient verbalizes understanding of self -management goals of living with Diabetes. On track Patient will verbalize understanding of self -management goals of living with Diabetes. Presently, patient states she doesn't know what to do with the new diabetic medications. PCP ordered patient to come in to office today to [address] [place] [address]. 9/4/2018:  Patient asked if PCP had changed her insulin; stated she thinks due to her not eating much, it needs to be changed. Patient was to come for [employer] today but states she got days mixed up but agreed to reschedule. Patient encouraged to decide foods she likes and try to eat the proper amounts from those rather than a full course meal she does not like. Patient agreed to make a list of likes for the son who grocery shops will bring those foods. Patient states she will ask PCP to decrease insulin. EW  
 
10/10/2018:  Patient Teach-back done on S&S of hyperglycemia & Hypo; patient discussed the causes resulting in hospital visit & what to do to avoid. Patient states she had been eating candy to prevent hypoglycemia; Review done on fast acting carbs to alleviate low BS. Patient agreed to check BS BID including before bed; stated PCP advised a night snack to prevent low BS drops during the night. NN mailed the Diabetes Hypoglycemia S&S Leaflet & Take Action Quickly if Low Blood Sugar leaflet by [employer]. EW   
 
10/24/2018: D/C Diet plan discussed with patient & son. Patient voiced taking Thurl Fix 24u today as ordered. Food Prep/diet/snacks discussed with son Hunter Morejon who prepared for breakfast, egg, potatoes, toast per patient. Also spoke with son to confirm PCP f/u visit. EW  
  
  Takes Medications as prescribed   On track 10/15/2018:  Goal:  Consult done w/ PCP; Patient's daughter Mat Tiwari agreed to  meds today post ALEKSANDAR visit.   EW 
 
10/18/2018:  Evna Schooling 10/3-10/9/2018:  Diabetic Ketoacidosis f/u:  NN received call from patient's daughter stating medication was at Pharmacy for pickup. Consult done w/ PCP for med. Inquiry and teachings done on reasons for DKA; daughter states she did not understand what patient could have eaten. Discussion regarding night snacks advised by PCP, however, patient was admitted to ED @ 1558. Discussion occurred re patient needing supervision when eating snacks and other meals as well. Discussion occurred on who does shopping, that no sugary foods nor beverages should be left around for patient to eat. Beverages should be replaced with water. Discussed patient's WBC 15.6  & A1c 10+. UTI teachings done. Will continue to f/u.  EW 
 
  
  
10/30/2018:  Goal date completion

## 2018-10-25 ENCOUNTER — HOME CARE VISIT (OUTPATIENT)
Dept: SCHEDULING | Facility: HOME HEALTH | Age: 78
End: 2018-10-25
Payer: MEDICARE

## 2018-10-25 VITALS
TEMPERATURE: 98.6 F | SYSTOLIC BLOOD PRESSURE: 144 MMHG | DIASTOLIC BLOOD PRESSURE: 64 MMHG | OXYGEN SATURATION: 98 % | HEART RATE: 75 BPM | RESPIRATION RATE: 16 BRPM

## 2018-10-25 PROCEDURE — 3331090002 HH PPS REVENUE DEBIT

## 2018-10-25 PROCEDURE — 3331090001 HH PPS REVENUE CREDIT

## 2018-10-25 PROCEDURE — G0299 HHS/HOSPICE OF RN EA 15 MIN: HCPCS

## 2018-10-25 PROCEDURE — 400013 HH SOC

## 2018-10-26 PROCEDURE — 3331090002 HH PPS REVENUE DEBIT

## 2018-10-26 PROCEDURE — 3331090001 HH PPS REVENUE CREDIT

## 2018-10-26 RX ORDER — PRAVASTATIN SODIUM 80 MG/1
80 TABLET ORAL DAILY
Qty: 90 TAB | Refills: 3 | Status: SHIPPED | OUTPATIENT
Start: 2018-10-26 | End: 2019-11-02 | Stop reason: SDUPTHER

## 2018-10-26 RX ORDER — LOSARTAN POTASSIUM AND HYDROCHLOROTHIAZIDE 25; 100 MG/1; MG/1
TABLET ORAL
Qty: 90 TAB | Refills: 3 | Status: SHIPPED | OUTPATIENT
Start: 2018-10-26 | End: 2018-11-12

## 2018-10-27 ENCOUNTER — HOME CARE VISIT (OUTPATIENT)
Dept: SCHEDULING | Facility: HOME HEALTH | Age: 78
End: 2018-10-27
Payer: MEDICARE

## 2018-10-27 PROCEDURE — 3331090002 HH PPS REVENUE DEBIT

## 2018-10-27 PROCEDURE — 3331090001 HH PPS REVENUE CREDIT

## 2018-10-28 PROCEDURE — 3331090001 HH PPS REVENUE CREDIT

## 2018-10-28 PROCEDURE — 3331090002 HH PPS REVENUE DEBIT

## 2018-10-29 ENCOUNTER — HOME CARE VISIT (OUTPATIENT)
Dept: HOME HEALTH SERVICES | Facility: HOME HEALTH | Age: 78
End: 2018-10-29
Payer: MEDICARE

## 2018-10-29 PROCEDURE — 3331090002 HH PPS REVENUE DEBIT

## 2018-10-29 PROCEDURE — 3331090001 HH PPS REVENUE CREDIT

## 2018-10-30 ENCOUNTER — HOME CARE VISIT (OUTPATIENT)
Dept: HOME HEALTH SERVICES | Facility: HOME HEALTH | Age: 78
End: 2018-10-30
Payer: MEDICARE

## 2018-10-30 PROCEDURE — 3331090001 HH PPS REVENUE CREDIT

## 2018-10-30 PROCEDURE — 3331090002 HH PPS REVENUE DEBIT

## 2018-10-31 ENCOUNTER — PATIENT OUTREACH (OUTPATIENT)
Dept: INTERNAL MEDICINE CLINIC | Age: 78
End: 2018-10-31

## 2018-10-31 ENCOUNTER — HOME CARE VISIT (OUTPATIENT)
Dept: SCHEDULING | Facility: HOME HEALTH | Age: 78
End: 2018-10-31
Payer: MEDICARE

## 2018-10-31 PROCEDURE — 3331090001 HH PPS REVENUE CREDIT

## 2018-10-31 PROCEDURE — 3331090002 HH PPS REVENUE DEBIT

## 2018-10-31 PROCEDURE — G0300 HHS/HOSPICE OF LPN EA 15 MIN: HCPCS

## 2018-10-31 NOTE — PROGRESS NOTES
[facility] 10/19-10/23/2018:  Diabetic Ketoacidosis f/u FINAL DIAGNOSES: 
1. Diabetic ketoacidosis. 2.  Diabetes mellitus type 1B. 3.  Memory deficits. 4.  Primary hypertension. 5.  Dyslipidemia. 6.  Hypothyroidism. 
  
 
Goals Addressed This Visit's Progress  Knowledge and adherence of medication (ie. action, side effects, missed dose, etc.) Patient states taking insulin as ordered. Daughter states insulin is being taken. EW  
 
10/31/2018:  Caretaker Daughter Jayson Landry states patient is having low BS levels during the night. Discussion on snack before bedtime. States Brother makes sure patient is receiving. Will continue to f/u.  EW  
 
 
  
  Patient verbalizes understanding of self -management goals of living with Diabetes. Patient will verbalize understanding of self -management goals of living with Diabetes. Presently, patient states she doesn't know what to do with the new diabetic medications. PCP ordered patient to come in to office today to [address] [place] [address]. 9/4/2018:  Patient asked if PCP had changed her insulin; stated she thinks due to her not eating much, it needs to be changed. Patient was to come for [employer] today but states she got days mixed up but agreed to reschedule. Patient encouraged to decide foods she likes and try to eat the proper amounts from those rather than a full course meal she does not like. Patient agreed to make a list of likes for the son who grocery shops will bring those foods. Patient states she will ask PCP to decrease insulin. EW  
 
10/10/2018:  Patient Teach-back done on S&S of hyperglycemia & Hypo; patient discussed the causes resulting in hospital visit & what to do to avoid. Patient states she had been eating candy to prevent hypoglycemia; Review done on fast acting carbs to alleviate low BS.   Patient agreed to check BS BID including before bed; stated PCP advised a night snack to prevent low BS drops during the night. NN mailed the Diabetes Hypoglycemia S&S Leaflet & Take Action Quickly if Low Blood Sugar leaflet by [employer]. EW   
 
10/24/2018: D/C Diet plan discussed with patient & son. Patient voiced taking Vianney Bone 24u today as ordered. Food Prep/diet/snacks discussed with son Heather Stanley who prepared for breakfast, egg, potatoes, toast per patient. Also spoke with son to confirm PCP f/u visit. EW  
 
10/31/2018: NN spoke with patient after speaking with Caretaker daughter Kolby Toussaint. Final diagnosis from hospital:  Includes Memory Deficits. Patient states she is still having low BS during the night. States she didn't remember to check her BS but at a cheese sandwich. Patient teachings covered S&S of Low BS and interventions: keeping by the bed glucose tablets or cake frosting tube by the bed, or 1/2 glass of milk (4oz), 1/2 can real soda, etc. The wait 15 min & recheck. ... Advised patient on the importance of checking BS before bed as well as upon awaking during the night due to low BS. EW  
  
  Patient verbalizes understanding of self -management goals of living with Diabetes. On track 10/31/2018:  NN spoke with patient. Discussed the frequency of hypoglycemia episodes as reported by Jorge Pete. Patient states  Self-schedules and keeps appointments    On track 10/31/2018:  Consult done w/ PCP re ALEKSANDAR; advised that patient needs to come to office for post d/c f/u visit. NN called; spoke with Caretaker daughter Kolby Toussaint;  States she had to cancel on yesterday, the appointment for Nov 5th due to conflict of scheduling. Refused to reschedule today; agrees to call back with the updated schedule. EW   
  
  
 
goal competion:  Ongoing.

## 2018-11-01 ENCOUNTER — PATIENT OUTREACH (OUTPATIENT)
Dept: INTERNAL MEDICINE CLINIC | Age: 78
End: 2018-11-01

## 2018-11-01 ENCOUNTER — OFFICE VISIT (OUTPATIENT)
Dept: INTERNAL MEDICINE CLINIC | Age: 78
End: 2018-11-01

## 2018-11-01 VITALS
OXYGEN SATURATION: 97 % | HEART RATE: 77 BPM | DIASTOLIC BLOOD PRESSURE: 68 MMHG | RESPIRATION RATE: 18 BRPM | TEMPERATURE: 99.4 F | SYSTOLIC BLOOD PRESSURE: 160 MMHG

## 2018-11-01 VITALS
DIASTOLIC BLOOD PRESSURE: 68 MMHG | BODY MASS INDEX: 32.18 KG/M2 | OXYGEN SATURATION: 98 % | HEIGHT: 62 IN | TEMPERATURE: 98 F | RESPIRATION RATE: 16 BRPM | SYSTOLIC BLOOD PRESSURE: 153 MMHG | HEART RATE: 73 BPM | WEIGHT: 174.9 LBS

## 2018-11-01 DIAGNOSIS — F01.50 VASCULAR DEMENTIA WITHOUT BEHAVIORAL DISTURBANCE (HCC): ICD-10-CM

## 2018-11-01 DIAGNOSIS — E78.5 DYSLIPIDEMIA: ICD-10-CM

## 2018-11-01 DIAGNOSIS — R41.3 MEMORY DEFICIT: ICD-10-CM

## 2018-11-01 DIAGNOSIS — I10 ESSENTIAL HYPERTENSION: ICD-10-CM

## 2018-11-01 DIAGNOSIS — E03.2 HYPOTHYROIDISM DUE TO MEDICATION: ICD-10-CM

## 2018-11-01 DIAGNOSIS — E10.65 HYPERGLYCEMIA DUE TO TYPE 1 DIABETES MELLITUS (HCC): Primary | ICD-10-CM

## 2018-11-01 PROBLEM — E11.21 TYPE 2 DIABETES WITH NEPHROPATHY (HCC): Status: RESOLVED | Noted: 2018-10-21 | Resolved: 2018-11-01

## 2018-11-01 PROBLEM — E87.5 HYPERKALEMIA: Status: RESOLVED | Noted: 2018-10-20 | Resolved: 2018-11-01

## 2018-11-01 PROBLEM — E87.20 LACTIC ACIDOSIS: Status: RESOLVED | Noted: 2018-10-20 | Resolved: 2018-11-01

## 2018-11-01 PROBLEM — E87.0 HYPEROSMOLAR HYPONATREMIA: Status: RESOLVED | Noted: 2018-10-20 | Resolved: 2018-11-01

## 2018-11-01 PROBLEM — N17.9 AKI (ACUTE KIDNEY INJURY) (HCC): Status: RESOLVED | Noted: 2018-10-20 | Resolved: 2018-11-01

## 2018-11-01 PROBLEM — E87.1 HYPEROSMOLAR HYPONATREMIA: Status: RESOLVED | Noted: 2018-10-20 | Resolved: 2018-11-01

## 2018-11-01 PROCEDURE — 3331090001 HH PPS REVENUE CREDIT

## 2018-11-01 PROCEDURE — 3331090002 HH PPS REVENUE DEBIT

## 2018-11-01 RX ORDER — INSULIN ASPART 100 [IU]/ML
INJECTION, SOLUTION INTRAVENOUS; SUBCUTANEOUS
Qty: 1 ADJUSTABLE DOSE PRE-FILLED PEN SYRINGE | Refills: 11 | Status: ON HOLD | OUTPATIENT
Start: 2018-11-01 | End: 2018-11-12 | Stop reason: SDUPTHER

## 2018-11-01 NOTE — PROGRESS NOTES
[facility] 10/19-10/23/2018:  Diabetic Ketoacidosis f/u  Goals Addressed                 This Visit's Progress     Self-schedules and keeps appointments    On track     10/31/2018:  Consult done w/ PCP re ALEKSANDAR; advised that patient needs to come to office for post d/c f/u visit. NN called; spoke with Caretaker daughter Farnaz Robbins;  States she had to cancel on yesterday, the appointment for Nov 5th due to conflict of scheduling. Refused to reschedule today; agrees to call back with the updated schedule. EW      11/1/2018:  NN spoke with Caretaker daughter Farnaz Robbins for [facility] d/c visit today if possible. Consult done w/ PCP regarding appointment. Agreed to place on schedule as late as needed due to caretaker daughter must leave work.   Scheduled for [email or web address].  NUNU W        goal completed 10/1/2018

## 2018-11-01 NOTE — PROGRESS NOTES
Chief Complaint Patient presents with  Transitions Of Care Patient admitted to [facility] hospital for DKA on 10/19/18. 1. Have you been to the ER, urgent care clinic since your last visit? Hospitalized since your last visit? Yes When: 10/19/18 Where: [facility] Reason for visit: DKA 2. Have you seen or consulted any other health care providers outside of the [facility] since your last visit? Include any pap smears or colon screening.  No

## 2018-11-02 ENCOUNTER — HOME CARE VISIT (OUTPATIENT)
Dept: SCHEDULING | Facility: HOME HEALTH | Age: 78
End: 2018-11-02
Payer: MEDICARE

## 2018-11-02 PROCEDURE — 3331090001 HH PPS REVENUE CREDIT

## 2018-11-02 PROCEDURE — G0300 HHS/HOSPICE OF LPN EA 15 MIN: HCPCS

## 2018-11-02 PROCEDURE — 3331090002 HH PPS REVENUE DEBIT

## 2018-11-02 NOTE — PROGRESS NOTES
[address] [place] [address] and Primary Care 
[place] [address] 
[address] [place] [address] [address] Phone:  [phone]  Fax: [phone] Chief Complaint Patient presents with  Transitions Of Care Patient admitted to [facility] for DKA on 10/19/18. .   
 
SUBJECTIVE: 
  Ivette Carlson is a 69 y.o. female She comes in for a return visit accompanied by her daughter. She was supposed to have come back before then because of her most recent hospitalization for diabetic ketoacidosis. She is currently taking 26 units of Tresiba. The rescue squad has been called on three separate occasions for a.m. hypoglycemia. She is taking only [place] and no short acting insulin. She is a type 1B diabetic. She has significant worsening of her short term memory. This makes her overall care very difficult without active assistance by others. This has to be done in order to remind her to take her medication and supervise this, as well as eating at the same time every day and following the overall diabetic regimen. She has a past history of primary hypertension and dyslipidemia, as well as hypothyroidism. Current Outpatient Medications Medication Sig Dispense Refill  insulin aspart U-100 (NOVOLOG) 100 unit/mL inpn 2 units AC breakfast and lunch1 1 Adjustable Dose Pre-filled Pen Syringe 11  
 losartan-hydroCHLOROthiazide (HYZAAR) 100-25 mg per tablet TAKE 1 TABLET BY MOUTH DAILY 90 Tab 3  pravastatin (PRAVACHOL) 80 mg tablet Take 1 Tab by mouth daily. 90 Tab 3  
 acetaminophen (TYLENOL) 500 mg tablet Take 500 mg by mouth every six (6) hours as needed for Pain.  fenofibrate nanocrystallized (TRICOR) 145 mg tablet Take 145 mg by mouth daily.     
 insulin degludec (TRESIBA FLEXTOUCH U-100) 100 unit/mL (3 mL) inpn Take 24 units every morning 3 Adjustable Dose Pre-filled Pen Syringe 11  
 levothyroxine (SYNTHROID) 150 mcg tablet TAKE 1 TABLET BY MOUTH EVERY DAY 30 Tab 11  
  amLODIPine (NORVASC) 10 mg tablet TAKE 1 TABLET BY MOUTH EVERY MORNING 90 Tab 3  clopidogrel (PLAVIX) 75 mg tab TAKE 1 TAB BY MOUTH DAILY. 30 Tab 11  
 brimonidine (ALPHAGAN) 0.15 % ophthalmic solution Administer 1 Drop to both eyes two (2) times a day. Past Medical History:  
Diagnosis Date  Diabetes ([facility] [address] [facility]  [facility]   
 unknown to family  Hypercholesteremia  Hypertension  Stroke ([facility]  Thyroid disease Past Surgical History:  
Procedure Laterality Date  HX GYN    
 HX HEENT    
 thyroidectomy  HX HYSTERECTOMY  REMOVAL GALLBLADDER    
 THYROIDECTOMY No Known Allergies REVIEW OF SYSTEMS: 
General: negative for - chills or fever ENT: negative for - headaches, nasal congestion or tinnitus Respiratory: negative for - cough, hemoptysis, shortness of breath or wheezing Cardiovascular : negative for - chest pain, edema, palpitations or shortness of breath Gastrointestinal: negative for - abdominal pain, blood in stools, heartburn or nausea/vomiting Genito-Urinary: no dysuria, trouble voiding, or hematuria Musculoskeletal: negative for - gait disturbance, joint pain, joint stiffness or joint swelling Neurological: no TIA or stroke symptoms Hematologic: no bruises, no bleeding, no swollen glands Integument: no lumps, mole changes, nail changes or rash Endocrine: no malaise/lethargy or unexpected weight changes Social History Socioeconomic History  Marital status:  Spouse name: Not on file  Number of children: 1  Years of education: Not on file  Highest education level: Not on file Social Needs  Financial resource strain: Not on file  Food insecurity - worry: Not on file  Food insecurity - inability: Not on file  Transportation needs - medical: Not on file  Transportation needs - non-medical: Not on file Occupational History  Occupation: retired Tobacco Use  
  Smoking status: Never Smoker  Smokeless tobacco: Never Used Substance and Sexual Activity  Alcohol use: No  
  Comment: been years  Drug use: No  
 Sexual activity: Not Currently Other Topics Concern  Not on file Social History Narrative  Not on file Family History Problem Relation Age of Onset  Diabetes Father  No Known Problems Mother OBJECTIVE: 
 
Visit Vitals /68 Pulse 73 Temp 98 °F (36.7 °C) (Oral) Resp 16 Ht 5' 2\" (1.575 m) Wt 174 lb 14.4 oz (79.3 kg) SpO2 98% BMI 31.99 kg/m² CONSTITUTIONAL: well , well nourished, appears age appropriate EYES: perrla, eom intact ENMT:moist mucous membranes, pharynx clear NECK: supple. Thyroid normal 
RESPIRATORY: Chest: clear to ascultation and percussion CARDIOVASCULAR: Heart: regular rate and rhythm GASTROINTESTINAL: Abdomen: soft, bowel sounds active HEMATOLOGIC: no pathological lymph nodes palpated MUSCULOSKELETAL: Extremities: no edema, pulse 1+ INTEGUMENT: No unusual rashes or suspicious skin lesions noted. Nails appear normal. 
NEUROLOGIC: non-focal exam, significant reduction in short-term memory MENTAL STATUS: alert and oriented, appropriate affect ASSESSMENT: 
1. Hyperglycemia due to type 1 diabetes mellitus ([address] 2. Memory deficit 3. Vascular dementia without behavioral disturbance 4. Dyslipidemia 5. Hypothyroidism due to medication 6. Essential hypertension PLAN: 
 
1. The patient is basically taking too much basal insulin. I will therefore reduce her Tomma Andes to 20 units. She will need prandial insulin. I explained to the patient, as well as daughter the prandial insulin is to cover what she consumes.   Her pattern of blood sugar elevation during the hospital stay on multiple occasions was a normal fasting with moderate elevation of her sugars throughout the day with significant reduction in the evening. She will therefore take NovoLog 2 units before breakfast and dinner to cover her meals with titration based on blood sugar checks a.c. and h.s.   
2. She will need total supervision of her diabetic care. This was evident during interview today when I told her on two to three separate occasions of the amount of insulin that she needs to take. When I asked her prior to leaving what her dosage should be, she could not remember. 3. Her blood pressure is excellent. 4. She will continue her statin as prescribed. 5. She will also continue her thyroid supplement. Follow-up Disposition: 
Return in about 2 weeks (around 11/15/2018).  
 
 
Carline Wolfe MD

## 2018-11-03 ENCOUNTER — HOSPITAL ENCOUNTER (EMERGENCY)
Age: 78
Discharge: HOME | End: 2018-11-03
Attending: EMERGENCY MEDICINE
Payer: MEDICARE

## 2018-11-03 ENCOUNTER — HOSPITAL ENCOUNTER (EMERGENCY)
Age: 78
Discharge: STILL ADMITTED OR ADMINISTRATIVE | End: 2018-11-03
Attending: EMERGENCY MEDICINE
Payer: MEDICARE

## 2018-11-03 VITALS
DIASTOLIC BLOOD PRESSURE: 52 MMHG | RESPIRATION RATE: 16 BRPM | SYSTOLIC BLOOD PRESSURE: 148 MMHG | OXYGEN SATURATION: 98 % | TEMPERATURE: 98.4 F | HEART RATE: 71 BPM

## 2018-11-03 VITALS
RESPIRATION RATE: 21 BRPM | WEIGHT: 170 LBS | SYSTOLIC BLOOD PRESSURE: 151 MMHG | DIASTOLIC BLOOD PRESSURE: 48 MMHG | TEMPERATURE: 98.3 F | HEIGHT: 63 IN | HEART RATE: 85 BPM | BODY MASS INDEX: 30.12 KG/M2 | OXYGEN SATURATION: 100 %

## 2018-11-03 DIAGNOSIS — E16.2 HYPOGLYCEMIA: Primary | ICD-10-CM

## 2018-11-03 LAB
GLUCOSE BLD STRIP.AUTO-MCNC: 172 MG/DL (ref 65–100)
GLUCOSE BLD STRIP.AUTO-MCNC: 358 MG/DL (ref 65–100)
GLUCOSE BLD STRIP.AUTO-MCNC: 89 MG/DL (ref 65–100)
GLUCOSE BLD STRIP.AUTO-MCNC: 98 MG/DL (ref 65–100)
SERVICE CMNT-IMP: ABNORMAL
SERVICE CMNT-IMP: ABNORMAL
SERVICE CMNT-IMP: NORMAL
SERVICE CMNT-IMP: NORMAL

## 2018-11-03 PROCEDURE — 82962 GLUCOSE BLOOD TEST: CPT

## 2018-11-03 PROCEDURE — 3331090001 HH PPS REVENUE CREDIT

## 2018-11-03 PROCEDURE — 75810000275 HC EMERGENCY DEPT VISIT NO LEVEL OF CARE

## 2018-11-03 PROCEDURE — 3331090002 HH PPS REVENUE DEBIT

## 2018-11-03 PROCEDURE — 99284 EMERGENCY DEPT VISIT MOD MDM: CPT

## 2018-11-03 NOTE — DISCHARGE INSTRUCTIONS
Take Tresiba 20 units daily until see Dr. Birdie Dakin.  Write down your sugars three times a day to show to Dr. Birdie Dakin

## 2018-11-03 NOTE — ED PROVIDER NOTES
EMERGENCY DEPARTMENT HISTORY AND PHYSICAL EXAM 
 
 
Date: 11/3/2018 Patient Name: Sima Foy History of Presenting Illness Chief Complaint Patient presents with  Low Blood Sugar History Provided By: Patient, EMS and Caregiver HPI: Sima Foy, 69 y.o. female with PMHx significant for DM, heart failure, hypercholesteremia, and HTN, presents by EMS to the ED for evaluation of hypoglycemia since earlier this morning. Pt explains that she was sleeping earlier this morning when both of her sons with whom she resides found that she was hard to arouse. Pt states that she was extremely drowsy this morning upon waking, and her blood sugar was low upon checking. Pt states that she is asymptomatic at this time, and she did not consume any food this morning. Pt reports that she takes insulin for her DM, and she had normal food intake yesterday. Pt denies any recent illnesses. Per EMS, the pt's BG was 20 while en route to the ED. EMS also notes that the pt's hypoglycemia is likely the result of her taking an extra dose of insulin. Because there are no symptoms or complaints of pain, there is no reported quality, severity, modifying factors, or associated signs and symptoms reported. There are no other complaints, changes, or physical findings at this time. PCP: Marilynn Dupree MD 
 
Current Outpatient Medications Medication Sig Dispense Refill  insulin aspart U-100 (NOVOLOG) 100 unit/mL inpn 2 units AC breakfast and lunch1 1 Adjustable Dose Pre-filled Pen Syringe 11  
 losartan-hydroCHLOROthiazide (HYZAAR) 100-25 mg per tablet TAKE 1 TABLET BY MOUTH DAILY 90 Tab 3  pravastatin (PRAVACHOL) 80 mg tablet Take 1 Tab by mouth daily. 90 Tab 3  
 acetaminophen (TYLENOL) 500 mg tablet Take 500 mg by mouth every six (6) hours as needed for Pain.  fenofibrate nanocrystallized (TRICOR) 145 mg tablet Take 145 mg by mouth daily.  insulin degludec (TRESIBA FLEXTOUCH U-100) 100 unit/mL (3 mL) inpn Take 24 units every morning 3 Adjustable Dose Pre-filled Pen Syringe 11  
 levothyroxine (SYNTHROID) 150 mcg tablet TAKE 1 TABLET BY MOUTH EVERY DAY 30 Tab 11  
 amLODIPine (NORVASC) 10 mg tablet TAKE 1 TABLET BY MOUTH EVERY MORNING 90 Tab 3  clopidogrel (PLAVIX) 75 mg tab TAKE 1 TAB BY MOUTH DAILY. 30 Tab 11  
 brimonidine (ALPHAGAN) 0.15 % ophthalmic solution Administer 1 Drop to both eyes two (2) times a day. Past History Past Medical History: 
Past Medical History:  
Diagnosis Date  Diabetes ([facility]  Heart failure ([facility] [address]   
 unknown to family  Hypercholesteremia  Hypertension  Stroke ([facility] [address]  Thyroid disease Past Surgical History: 
Past Surgical History:  
Procedure Laterality Date  HX GYN    
 HX HEENT    
 thyroidectomy  HX HYSTERECTOMY  REMOVAL GALLBLADDER    
 THYROIDECTOMY Family History: 
Family History Problem Relation Age of Onset  Diabetes Father  No Known Problems Mother Social History: 
Social History Tobacco Use  Smoking status: Never Smoker  Smokeless tobacco: Never Used Substance Use Topics  Alcohol use: No  
  Comment: been years  Drug use: No  
 
 
Allergies: 
No Known Allergies Review of Systems Review of Systems Constitutional: Negative. Negative for chills, diaphoresis and fever. HENT: Negative. Negative for congestion, sore throat and trouble swallowing. Eyes: Negative. Negative for photophobia, pain and redness. Respiratory: Negative. Negative for cough, chest tightness, shortness of breath and wheezing. Cardiovascular: Negative. Negative for chest pain and palpitations. Gastrointestinal: Negative. Negative for abdominal pain, blood in stool, diarrhea, nausea and vomiting. Genitourinary: Negative for difficulty urinating, dysuria and frequency. Musculoskeletal: Negative. Negative for arthralgias, myalgias, neck pain and neck stiffness. Skin: Negative. Neurological: Negative. Negative for dizziness, tremors, seizures, syncope, speech difficulty, light-headedness and headaches. Psychiatric/Behavioral: Negative. Negative for confusion. The patient is not nervous/anxious. All other systems reviewed and are negative. Physical Exam  
Physical Exam  
Constitutional: She is oriented to person, place, and time. She appears well-developed and well-nourished. HENT:  
Head: Normocephalic and atraumatic. Eyes: Conjunctivae and EOM are normal.  
Neck: Normal range of motion. Neck supple. Cardiovascular: Normal rate and regular rhythm. Pulmonary/Chest: Effort normal and breath sounds normal. No respiratory distress. Abdominal: Soft. She exhibits no distension. There is no tenderness. Musculoskeletal: Normal range of motion. Neurological: She is alert and oriented to person, place, and time. Skin: Skin is warm and dry. Psychiatric: She has a normal mood and affect. Nursing note and vitals reviewed. Medical Decision Making I am the first provider for this patient. I reviewed the vital signs, available nursing notes, past medical history, past surgical history, family history and social history. Vital Signs-Reviewed the patient's vital signs. Patient Vitals for the past 12 hrs: 
 Temp Pulse Resp BP SpO2  
11/03/18 0700    174/65 100 % 11/03/18 0630    157/61 100 % 11/03/18 0540 98.3 °F (36.8 °C) 84 17 (!) 159/92 100 % Pulse Oximetry Analysis - 100% on RA Cardiac Monitor:  
Rate: 83 bpm 
Rhythm: Normal Sinus Rhythm Records Reviewed: Nursing Notes, Old Medical Records and Previous Laboratory Studies Provider Notes (Medical Decision Making):  
Patient presents for hypoglycemia. Likely due to taking insulin without eating an adequate meal.  Will closely monitor glucoses here.  Provided with food and juice. DDx also includes insulinoma, infection. ED Course:  
Initial assessment performed. The patients presenting problems have been discussed, and they are in agreement with the care plan formulated and outlined with them. I have encouraged them to ask questions as they arise throughout their visit. Critical Care Time:  
0 Disposition: 
8:22 AM 
The patient has been re-evaluated and is ready for discharge. Reviewed available results with patient. Counseled patient on diagnosis and care plan. Patient has expressed understanding, and all questions have been answered. Patient agrees with plan and agrees to follow up as recommended, or return to the ED if their symptoms worsen. Discharge instructions have been provided and explained to the patient, along with reasons to return to the ED. PLAN: 
1. Follow-up Information Follow up With Specialties Details Why Contact Info Clint Blank MD Internal Medicine Schedule an appointment as soon as possible for a visit  [facility] [address] [address] 
[phone] Return to ED if worse Diagnosis Clinical Impression: 1. Hypoglycemia Attestations: This note is prepared by Ace Cuellar, acting as Scribe for Seun Mayberry M.D. Seun Mayberry M.D: The scribe's documentation has been prepared under my direction and personally reviewed by me in its entirety. I confirm that the note above accurately reflects all work, treatment, procedures, and medical decision making performed by me.

## 2018-11-03 NOTE — ED NOTES
Bedside shift change report given to Marjorie Cortez RN (oncoming nurse) by Reba Hall RN (offgoing nurse). Report included the following information SBAR, ED Summary, MAR and Recent Results.

## 2018-11-03 NOTE — ED NOTES
Dr Earl Serna reviewed discharge instructions with the patient. The patient verbalized understanding. All questions and concerns were addressed. The patient declined a wheelchair and is discharged ambulatory in the care of family members with instructions and prescriptions in hand. Pt is alert and oriented x 4. Respirations are clear and unlabored.

## 2018-11-04 PROCEDURE — 3331090002 HH PPS REVENUE DEBIT

## 2018-11-04 PROCEDURE — 3331090001 HH PPS REVENUE CREDIT

## 2018-11-05 ENCOUNTER — PATIENT OUTREACH (OUTPATIENT)
Dept: INTERNAL MEDICINE CLINIC | Age: 78
End: 2018-11-05

## 2018-11-05 PROCEDURE — 3331090001 HH PPS REVENUE CREDIT

## 2018-11-05 PROCEDURE — 3331090002 HH PPS REVENUE DEBIT

## 2018-11-05 NOTE — PROGRESS NOTES
[facility] 11/3/2018:  Hypoglycemia f/u 
 
Goals Addressed This Visit's Progress  Patient verbalizes understanding of self -management goals of living with Diabetes. On track 10/31/2018:  NN spoke with patient. Discussed the frequency of hypoglycemia episodes as reported by Ching Grullon. Patient states she knows how to take her insulin; denies that it is anything except she is not eating enough. Needs support-Will consult w/ Daughter Pablo Gilbert and PCP. EW  
 
11/5/2018:  Patient denied furtherance of hypoglycemia episodes. Patient stated she is not eating enough and has never been a big eater. Family support:  Patient stated her son was asleep at the moment preventing NN from speaking with him to inquire if live-in son can assist with times of eating and times of insulin administration. Patient denied she needed any assistance. Strategy:  Patient agreed to place notes on refridgerator, bathroom mirror, etc as reminder to taking bedtime snack. Patient states caretaker daughter Pablo Gilbert does not live there but comes everyday at [address] to assure she is consuming a balanced diet. Barrier:  Lack of understanding disease. Goal:  To continue teachings on insulin teach-back (amount), and amounts to administer. EW 
 
  
  
Goal:  On-going

## 2018-11-06 ENCOUNTER — HOME CARE VISIT (OUTPATIENT)
Dept: SCHEDULING | Facility: HOME HEALTH | Age: 78
End: 2018-11-06
Payer: MEDICARE

## 2018-11-06 ENCOUNTER — HOSPITAL ENCOUNTER (INPATIENT)
Age: 78
LOS: 2 days | Discharge: HOME | DRG: 639 | End: 2018-11-12
Attending: EMERGENCY MEDICINE | Admitting: INTERNAL MEDICINE
Payer: MEDICARE

## 2018-11-06 DIAGNOSIS — E10.65 HYPERGLYCEMIA DUE TO TYPE 1 DIABETES MELLITUS (HCC): ICD-10-CM

## 2018-11-06 DIAGNOSIS — E13.10 DIABETIC KETOACIDOSIS WITHOUT COMA ASSOCIATED WITH OTHER SPECIFIED DIABETES MELLITUS (HCC): Primary | ICD-10-CM

## 2018-11-06 PROBLEM — Z86.73 H/O: CVA (CEREBROVASCULAR ACCIDENT): Status: ACTIVE | Noted: 2018-11-06

## 2018-11-06 LAB
ALBUMIN SERPL-MCNC: 3.3 G/DL (ref 3.5–5)
ALBUMIN/GLOB SERPL: 1 {RATIO} (ref 1.1–2.2)
ALP SERPL-CCNC: 103 U/L (ref 45–117)
ALT SERPL-CCNC: 24 U/L (ref 12–78)
ANION GAP SERPL CALC-SCNC: 15 MMOL/L (ref 5–15)
ANION GAP SERPL CALC-SCNC: 25 MMOL/L (ref 5–15)
ANION GAP SERPL CALC-SCNC: 27 MMOL/L (ref 5–15)
APPEARANCE UR: CLEAR
AST SERPL-CCNC: 19 U/L (ref 15–37)
BACTERIA URNS QL MICRO: ABNORMAL /HPF
BASOPHILS # BLD: 0 K/UL (ref 0–0.1)
BASOPHILS NFR BLD: 0 % (ref 0–1)
BILIRUB SERPL-MCNC: 0.7 MG/DL (ref 0.2–1)
BILIRUB UR QL: NEGATIVE
BUN SERPL-MCNC: 28 MG/DL (ref 6–20)
BUN SERPL-MCNC: 29 MG/DL (ref 6–20)
BUN SERPL-MCNC: 29 MG/DL (ref 6–20)
BUN/CREAT SERPL: 21 (ref 12–20)
BUN/CREAT SERPL: 23 (ref 12–20)
BUN/CREAT SERPL: 26 (ref 12–20)
CALCIUM SERPL-MCNC: 8.3 MG/DL (ref 8.5–10.1)
CALCIUM SERPL-MCNC: 8.5 MG/DL (ref 8.5–10.1)
CALCIUM SERPL-MCNC: 8.6 MG/DL (ref 8.5–10.1)
CHLORIDE SERPL-SCNC: 100 MMOL/L (ref 97–108)
CHLORIDE SERPL-SCNC: 104 MMOL/L (ref 97–108)
CHLORIDE SERPL-SCNC: 95 MMOL/L (ref 97–108)
CO2 SERPL-SCNC: 10 MMOL/L (ref 21–32)
CO2 SERPL-SCNC: 10 MMOL/L (ref 21–32)
CO2 SERPL-SCNC: 19 MMOL/L (ref 21–32)
COLOR UR: ABNORMAL
CREAT SERPL-MCNC: 1.07 MG/DL (ref 0.55–1.02)
CREAT SERPL-MCNC: 1.26 MG/DL (ref 0.55–1.02)
CREAT SERPL-MCNC: 1.37 MG/DL (ref 0.55–1.02)
DIFFERENTIAL METHOD BLD: ABNORMAL
EOSINOPHIL # BLD: 0 K/UL (ref 0–0.4)
EOSINOPHIL NFR BLD: 0 % (ref 0–7)
EPITH CASTS URNS QL MICRO: ABNORMAL /LPF
ERYTHROCYTE [DISTWIDTH] IN BLOOD BY AUTOMATED COUNT: 13.9 % (ref 11.5–14.5)
EST. AVERAGE GLUCOSE BLD GHB EST-MCNC: 243 MG/DL
GLOBULIN SER CALC-MCNC: 3.4 G/DL (ref 2–4)
GLUCOSE BLD STRIP.AUTO-MCNC: 101 MG/DL (ref 65–100)
GLUCOSE BLD STRIP.AUTO-MCNC: 128 MG/DL (ref 65–100)
GLUCOSE BLD STRIP.AUTO-MCNC: 190 MG/DL (ref 65–100)
GLUCOSE BLD STRIP.AUTO-MCNC: 273 MG/DL (ref 65–100)
GLUCOSE BLD STRIP.AUTO-MCNC: 341 MG/DL (ref 65–100)
GLUCOSE BLD STRIP.AUTO-MCNC: 475 MG/DL (ref 65–100)
GLUCOSE BLD STRIP.AUTO-MCNC: 91 MG/DL (ref 65–100)
GLUCOSE BLD STRIP.AUTO-MCNC: 98 MG/DL (ref 65–100)
GLUCOSE SERPL-MCNC: 102 MG/DL (ref 65–100)
GLUCOSE SERPL-MCNC: 298 MG/DL (ref 65–100)
GLUCOSE SERPL-MCNC: 488 MG/DL (ref 65–100)
GLUCOSE UR STRIP.AUTO-MCNC: >1000 MG/DL
HBA1C MFR BLD: 10.1 % (ref 4.2–6.3)
HCT VFR BLD AUTO: 33.6 % (ref 35–47)
HGB BLD-MCNC: 10.7 G/DL (ref 11.5–16)
HGB UR QL STRIP: NEGATIVE
IMM GRANULOCYTES # BLD: 0.1 K/UL (ref 0–0.04)
IMM GRANULOCYTES NFR BLD AUTO: 1 % (ref 0–0.5)
KETONES UR QL STRIP.AUTO: 80 MG/DL
LEUKOCYTE ESTERASE UR QL STRIP.AUTO: NEGATIVE
LYMPHOCYTES # BLD: 0.8 K/UL (ref 0.8–3.5)
LYMPHOCYTES NFR BLD: 10 % (ref 12–49)
MAGNESIUM SERPL-MCNC: 2.1 MG/DL (ref 1.6–2.4)
MAGNESIUM SERPL-MCNC: 2.1 MG/DL (ref 1.6–2.4)
MCH RBC QN AUTO: 31.3 PG (ref 26–34)
MCHC RBC AUTO-ENTMCNC: 31.8 G/DL (ref 30–36.5)
MCV RBC AUTO: 98.2 FL (ref 80–99)
MONOCYTES # BLD: 0.5 K/UL (ref 0–1)
MONOCYTES NFR BLD: 7 % (ref 5–13)
NEUTS SEG # BLD: 6.3 K/UL (ref 1.8–8)
NEUTS SEG NFR BLD: 82 % (ref 32–75)
NITRITE UR QL STRIP.AUTO: NEGATIVE
NRBC # BLD: 0 K/UL (ref 0–0.01)
NRBC BLD-RTO: 0 PER 100 WBC
PH UR STRIP: 5 [PH] (ref 5–8)
PHOSPHATE SERPL-MCNC: 3.8 MG/DL (ref 2.6–4.7)
PLATELET # BLD AUTO: 318 K/UL (ref 150–400)
PMV BLD AUTO: 9.7 FL (ref 8.9–12.9)
POTASSIUM SERPL-SCNC: 3.8 MMOL/L (ref 3.5–5.1)
POTASSIUM SERPL-SCNC: 4 MMOL/L (ref 3.5–5.1)
POTASSIUM SERPL-SCNC: 5.1 MMOL/L (ref 3.5–5.1)
PROT SERPL-MCNC: 6.7 G/DL (ref 6.4–8.2)
PROT UR STRIP-MCNC: NEGATIVE MG/DL
RBC # BLD AUTO: 3.42 M/UL (ref 3.8–5.2)
RBC #/AREA URNS HPF: ABNORMAL /HPF (ref 0–5)
RBC MORPH BLD: ABNORMAL
SERVICE CMNT-IMP: ABNORMAL
SERVICE CMNT-IMP: NORMAL
SERVICE CMNT-IMP: NORMAL
SODIUM SERPL-SCNC: 132 MMOL/L (ref 136–145)
SODIUM SERPL-SCNC: 135 MMOL/L (ref 136–145)
SODIUM SERPL-SCNC: 138 MMOL/L (ref 136–145)
SP GR UR REFRACTOMETRY: 1.02 (ref 1–1.03)
UROBILINOGEN UR QL STRIP.AUTO: 0.2 EU/DL (ref 0.2–1)
WBC # BLD AUTO: 7.7 K/UL (ref 3.6–11)
WBC URNS QL MICRO: ABNORMAL /HPF (ref 0–4)

## 2018-11-06 PROCEDURE — 96374 THER/PROPH/DIAG INJ IV PUSH: CPT

## 2018-11-06 PROCEDURE — 84100 ASSAY OF PHOSPHORUS: CPT | Performed by: EMERGENCY MEDICINE

## 2018-11-06 PROCEDURE — 82962 GLUCOSE BLOOD TEST: CPT

## 2018-11-06 PROCEDURE — 74011250636 HC RX REV CODE- 250/636: Performed by: INTERNAL MEDICINE

## 2018-11-06 PROCEDURE — 99285 EMERGENCY DEPT VISIT HI MDM: CPT

## 2018-11-06 PROCEDURE — 65660000000 HC RM CCU STEPDOWN

## 2018-11-06 PROCEDURE — 74011636637 HC RX REV CODE- 636/637: Performed by: EMERGENCY MEDICINE

## 2018-11-06 PROCEDURE — 83036 HEMOGLOBIN GLYCOSYLATED A1C: CPT | Performed by: EMERGENCY MEDICINE

## 2018-11-06 PROCEDURE — 74011000250 HC RX REV CODE- 250: Performed by: INTERNAL MEDICINE

## 2018-11-06 PROCEDURE — 80053 COMPREHEN METABOLIC PANEL: CPT | Performed by: EMERGENCY MEDICINE

## 2018-11-06 PROCEDURE — 85025 COMPLETE CBC W/AUTO DIFF WBC: CPT | Performed by: EMERGENCY MEDICINE

## 2018-11-06 PROCEDURE — 36415 COLL VENOUS BLD VENIPUNCTURE: CPT | Performed by: EMERGENCY MEDICINE

## 2018-11-06 PROCEDURE — 3331090001 HH PPS REVENUE CREDIT

## 2018-11-06 PROCEDURE — 81001 URINALYSIS AUTO W/SCOPE: CPT | Performed by: EMERGENCY MEDICINE

## 2018-11-06 PROCEDURE — 65390000012 HC CONDITION CODE 44 OBSERVATION

## 2018-11-06 PROCEDURE — 74011000258 HC RX REV CODE- 258: Performed by: EMERGENCY MEDICINE

## 2018-11-06 PROCEDURE — 74011000258 HC RX REV CODE- 258: Performed by: HOSPITALIST

## 2018-11-06 PROCEDURE — G0300 HHS/HOSPICE OF LPN EA 15 MIN: HCPCS

## 2018-11-06 PROCEDURE — 80048 BASIC METABOLIC PNL TOTAL CA: CPT | Performed by: EMERGENCY MEDICINE

## 2018-11-06 PROCEDURE — 83735 ASSAY OF MAGNESIUM: CPT | Performed by: EMERGENCY MEDICINE

## 2018-11-06 PROCEDURE — 3331090002 HH PPS REVENUE DEBIT

## 2018-11-06 RX ORDER — FENOFIBRATE 145 MG/1
145 TABLET, COATED ORAL DAILY
Status: DISCONTINUED | OUTPATIENT
Start: 2018-11-07 | End: 2018-11-12 | Stop reason: HOSPADM

## 2018-11-06 RX ORDER — DEXTROSE MONOHYDRATE AND SODIUM CHLORIDE 5; .45 G/100ML; G/100ML
125 INJECTION, SOLUTION INTRAVENOUS CONTINUOUS
Status: DISCONTINUED | OUTPATIENT
Start: 2018-11-06 | End: 2018-11-07

## 2018-11-06 RX ORDER — INSULIN LISPRO 100 [IU]/ML
INJECTION, SOLUTION INTRAVENOUS; SUBCUTANEOUS
Status: DISCONTINUED | OUTPATIENT
Start: 2018-11-06 | End: 2018-11-08

## 2018-11-06 RX ORDER — AMLODIPINE BESYLATE 5 MG/1
10 TABLET ORAL DAILY
Status: DISCONTINUED | OUTPATIENT
Start: 2018-11-07 | End: 2018-11-12 | Stop reason: HOSPADM

## 2018-11-06 RX ORDER — CLOPIDOGREL BISULFATE 75 MG/1
75 TABLET ORAL DAILY
Status: DISCONTINUED | OUTPATIENT
Start: 2018-11-07 | End: 2018-11-12 | Stop reason: HOSPADM

## 2018-11-06 RX ORDER — BRIMONIDINE TARTRATE 2 MG/ML
1 SOLUTION/ DROPS OPHTHALMIC 2 TIMES DAILY
Status: DISCONTINUED | OUTPATIENT
Start: 2018-11-06 | End: 2018-11-12 | Stop reason: HOSPADM

## 2018-11-06 RX ORDER — INSULIN DEGLUDEC 100 U/ML
20 INJECTION, SOLUTION SUBCUTANEOUS DAILY
Status: ON HOLD | COMMUNITY
End: 2018-11-12 | Stop reason: SDUPTHER

## 2018-11-06 RX ORDER — SODIUM CHLORIDE 0.9 % (FLUSH) 0.9 %
5-10 SYRINGE (ML) INJECTION AS NEEDED
Status: DISCONTINUED | OUTPATIENT
Start: 2018-11-06 | End: 2018-11-12 | Stop reason: HOSPADM

## 2018-11-06 RX ORDER — ACETAMINOPHEN 325 MG/1
650 TABLET ORAL
Status: DISCONTINUED | OUTPATIENT
Start: 2018-11-06 | End: 2018-11-12 | Stop reason: HOSPADM

## 2018-11-06 RX ORDER — DEXTROSE 50 % IN WATER (D50W) INTRAVENOUS SYRINGE
25-50 AS NEEDED
Status: DISCONTINUED | OUTPATIENT
Start: 2018-11-06 | End: 2018-11-07

## 2018-11-06 RX ORDER — ONDANSETRON 2 MG/ML
4 INJECTION INTRAMUSCULAR; INTRAVENOUS
Status: DISCONTINUED | OUTPATIENT
Start: 2018-11-06 | End: 2018-11-12 | Stop reason: HOSPADM

## 2018-11-06 RX ORDER — PRAVASTATIN SODIUM 40 MG/1
80 TABLET ORAL DAILY
Status: DISCONTINUED | OUTPATIENT
Start: 2018-11-07 | End: 2018-11-12 | Stop reason: HOSPADM

## 2018-11-06 RX ORDER — MAGNESIUM SULFATE 100 %
4 CRYSTALS MISCELLANEOUS AS NEEDED
Status: DISCONTINUED | OUTPATIENT
Start: 2018-11-06 | End: 2018-11-07

## 2018-11-06 RX ORDER — LEVOTHYROXINE SODIUM 150 UG/1
150 TABLET ORAL
Status: DISCONTINUED | OUTPATIENT
Start: 2018-11-07 | End: 2018-11-12 | Stop reason: HOSPADM

## 2018-11-06 RX ORDER — SODIUM CHLORIDE 0.9 % (FLUSH) 0.9 %
5-10 SYRINGE (ML) INJECTION EVERY 8 HOURS
Status: DISCONTINUED | OUTPATIENT
Start: 2018-11-06 | End: 2018-11-12 | Stop reason: HOSPADM

## 2018-11-06 RX ORDER — ENOXAPARIN SODIUM 100 MG/ML
40 INJECTION SUBCUTANEOUS EVERY 24 HOURS
Status: DISCONTINUED | OUTPATIENT
Start: 2018-11-06 | End: 2018-11-12 | Stop reason: HOSPADM

## 2018-11-06 RX ADMIN — DEXTROSE MONOHYDRATE AND SODIUM CHLORIDE 125 ML/HR: 5; .45 INJECTION, SOLUTION INTRAVENOUS at 21:20

## 2018-11-06 RX ADMIN — INSULIN HUMAN 10 UNITS: 100 INJECTION, SOLUTION PARENTERAL at 15:22

## 2018-11-06 RX ADMIN — SODIUM CHLORIDE 4.3 UNITS/HR: 900 INJECTION, SOLUTION INTRAVENOUS at 18:22

## 2018-11-06 RX ADMIN — BRIMONIDINE TARTRATE 1 DROP: 2 SOLUTION/ DROPS OPHTHALMIC at 23:47

## 2018-11-06 RX ADMIN — Medication 10 ML: at 23:47

## 2018-11-06 RX ADMIN — SODIUM CHLORIDE 1000 ML: 900 INJECTION, SOLUTION INTRAVENOUS at 19:39

## 2018-11-06 RX ADMIN — ENOXAPARIN SODIUM 40 MG: 40 INJECTION, SOLUTION INTRAVENOUS; SUBCUTANEOUS at 21:37

## 2018-11-06 RX ADMIN — SODIUM CHLORIDE 0.7 UNITS/HR: 900 INJECTION, SOLUTION INTRAVENOUS at 20:38

## 2018-11-06 RX ADMIN — INSULIN HUMAN 5 UNITS: 100 INJECTION, SOLUTION PARENTERAL at 15:22

## 2018-11-06 NOTE — ROUTINE PROCESS
TRANSFER - OUT REPORT: 
 
Verbal report given to Harvinder on Margarito Conroy  being transferred to [facility](unit) for routine progression of care Report consisted of patients Situation, Background, Assessment and  
Recommendations(SBAR). Information from the following report(s) SBAR, Kardex, ED Summary, Procedure Summary, Intake/Output and MAR was reviewed with the receiving nurse. Lines:    
 
Opportunity for questions and clarification was provided. Patient transported with: 
 Monitor

## 2018-11-06 NOTE — ED PROVIDER NOTES
EMERGENCY DEPARTMENT HISTORY AND PHYSICAL EXAM 
 
 
Date: 11/6/2018 Patient Name: Margarito Conroy History of Presenting Illness Chief Complaint Patient presents with  
 High Blood Sugar Patient found to have blood sugar in 400's by nurse. History Provided By: Patient HPI: Margarito Conroy, 69 y.o. female with PMHx significant for DM, presents via EMS to the ED for evaluation of persistent elevated blood sugar level today. This morning, her home health nurse informed her blood glucose level to be 400 and recommended she come to ED for further evaluation. She states she has not had her novolog this morning. Pt denies polyuria and polydipsia. She specifically denies any fevers, chills, nausea, vomiting, chest pain, shortness of breath, headache, rash, diarrhea, sweating or weight loss. Chief Complaint: Elevated blood glucose Duration: This morning Timing: Persistent Modifying Factors: Denies taking insuline this morning Associated Symptoms: Denies polyuria and polydipsia denies any other associated signs or symptoms There are no other complaints, changes, or physical findings at this time. PCP: Alex oJshi MD 
 
Current Facility-Administered Medications Medication Dose Route Frequency Provider Last Rate Last Dose  insulin regular (NOVOLIN R, HUMULIN R) 100 Units in 0.9% sodium chloride 100 mL infusion  0-50 Units/hr IntraVENous TITRATE Josias Morris MD      
 insulin lispro (HUMALOG) injection   SubCUTAneous TIDAC Josias Morris MD      
 glucose chewable tablet 16 g  4 Tab Oral PRN Josias Morris MD      
 dextrose (D50W) injection syrg 12.5-25 g  25-50 mL IntraVENous PRN Josias Morris MD      
 glucagon (GLUCAGEN) injection 1 mg  1 mg IntraMUSCular PRN Josias Morris MD      
 
Current Outpatient Medications Medication Sig Dispense Refill  insulin aspart U-100 (NOVOLOG) 100 unit/mL inpn 2 units AC breakfast and lunch1 1 Adjustable Dose Pre-filled Pen Syringe 11  
 losartan-hydroCHLOROthiazide (HYZAAR) 100-25 mg per tablet TAKE 1 TABLET BY MOUTH DAILY 90 Tab 3  pravastatin (PRAVACHOL) 80 mg tablet Take 1 Tab by mouth daily. 90 Tab 3  
 acetaminophen (TYLENOL) 500 mg tablet Take 500 mg by mouth every six (6) hours as needed for Pain.  fenofibrate nanocrystallized (TRICOR) 145 mg tablet Take 145 mg by mouth daily.  insulin degludec (TRESIBA FLEXTOUCH U-100) 100 unit/mL (3 mL) inpn Take 24 units every morning 3 Adjustable Dose Pre-filled Pen Syringe 11  
 levothyroxine (SYNTHROID) 150 mcg tablet TAKE 1 TABLET BY MOUTH EVERY DAY 30 Tab 11  
 amLODIPine (NORVASC) 10 mg tablet TAKE 1 TABLET BY MOUTH EVERY MORNING 90 Tab 3  clopidogrel (PLAVIX) 75 mg tab TAKE 1 TAB BY MOUTH DAILY. 30 Tab 11  
 brimonidine (ALPHAGAN) 0.15 % ophthalmic solution Administer 1 Drop to both eyes two (2) times a day. Past History Past Medical History: 
Past Medical History:  
Diagnosis Date  Diabetes ([address]  Heart failure ([facility] [address]   
 unknown to family  Hypercholesteremia  Hypertension  Stroke ([facility] [address]  Thyroid disease Past Surgical History: 
Past Surgical History:  
Procedure Laterality Date  HX GYN    
 HX HEENT    
 thyroidectomy  HX HYSTERECTOMY  REMOVAL GALLBLADDER    
 THYROIDECTOMY Family History: 
Family History Problem Relation Age of Onset  Diabetes Father  No Known Problems Mother Social History: 
Social History Tobacco Use  Smoking status: Never Smoker  Smokeless tobacco: Never Used Substance Use Topics  Alcohol use: No  
  Comment: been years  Drug use: No  
 
 
Allergies: 
No Known Allergies Review of Systems Review of Systems Constitutional: Negative. Negative for activity change, appetite change, chills, fatigue, fever and unexpected weight change. HENT: Negative.   Negative for congestion, hearing loss, rhinorrhea, sneezing and voice change. Eyes: Negative. Negative for pain and visual disturbance. Respiratory: Negative. Negative for apnea, cough, choking, chest tightness and shortness of breath. Cardiovascular: Negative. Negative for chest pain and palpitations. Gastrointestinal: Negative. Negative for abdominal distention, abdominal pain, blood in stool, diarrhea, nausea and vomiting. Endocrine: Negative for polydipsia and polyuria. Genitourinary: Negative. Negative for difficulty urinating, flank pain, frequency and urgency. No discharge Musculoskeletal: Negative. Negative for arthralgias, back pain, myalgias and neck stiffness. Skin: Negative. Negative for color change and rash. Neurological: Negative. Negative for dizziness, seizures, syncope, speech difficulty, weakness, numbness and headaches. Hematological: Negative for adenopathy. Psychiatric/Behavioral: Negative. Negative for agitation, behavioral problems, dysphoric mood and suicidal ideas. The patient is not nervous/anxious. Physical Exam  
Physical Exam  
Constitutional: She is oriented to person, place, and time. She appears well-developed and well-nourished. No distress. HENT:  
Head: Normocephalic and atraumatic. Mouth/Throat: Oropharynx is clear and moist. Mucous membranes are dry. No oropharyngeal exudate. Eyes: Conjunctivae and EOM are normal. Pupils are equal, round, and reactive to light. Right eye exhibits no discharge. Left eye exhibits no discharge. Neck: Normal range of motion. Neck supple. Cardiovascular: Normal rate, regular rhythm and intact distal pulses. Exam reveals no gallop and no friction rub. No murmur heard. Pulmonary/Chest: Effort normal and breath sounds normal. No respiratory distress. She has no wheezes. She has no rales. She exhibits no tenderness. Abdominal: Soft.  Bowel sounds are normal. She exhibits no distension and no mass. There is no tenderness. There is no rebound and no guarding. Musculoskeletal: Normal range of motion. She exhibits no edema. Lymphadenopathy:  
  She has no cervical adenopathy. Neurological: She is alert and oriented to person, place, and time. No cranial nerve deficit. Coordination normal.  
Skin: Skin is warm and dry. No rash noted. No erythema. Psychiatric: She has a normal mood and affect. Nursing note and vitals reviewed. Diagnostic Study Results Labs - Recent Results (from the past 12 hour(s)) GLUCOSE, POC Collection Time: 11/06/18  2:30 PM  
Result Value Ref Range Glucose (POC) 475 (H) 65 - 100 mg/dL Performed by Alysia Chacon CBC WITH AUTOMATED DIFF Collection Time: 11/06/18  3:12 PM  
Result Value Ref Range WBC 7.7 3.6 - 11.0 K/uL  
 RBC 3.42 (L) 3.80 - 5.20 M/uL  
 HGB 10.7 (L) 11.5 - 16.0 g/dL HCT 33.6 (L) 35.0 - 47.0 % MCV 98.2 80.0 - 99.0 FL  
 MCH 31.3 26.0 - 34.0 PG  
 MCHC 31.8 30.0 - 36.5 g/dL  
 RDW 13.9 11.5 - 14.5 % PLATELET 169 493 - 341 K/uL MPV 9.7 8.9 - 12.9 FL  
 NRBC 0.0 0  WBC ABSOLUTE NRBC 0.00 0.00 - 0.01 K/uL NEUTROPHILS 82 (H) 32 - 75 % LYMPHOCYTES 10 (L) 12 - 49 % MONOCYTES 7 5 - 13 % EOSINOPHILS 0 0 - 7 % BASOPHILS 0 0 - 1 % IMMATURE GRANULOCYTES 1 (H) 0.0 - 0.5 % ABS. NEUTROPHILS 6.3 1.8 - 8.0 K/UL  
 ABS. LYMPHOCYTES 0.8 0.8 - 3.5 K/UL  
 ABS. MONOCYTES 0.5 0.0 - 1.0 K/UL  
 ABS. EOSINOPHILS 0.0 0.0 - 0.4 K/UL  
 ABS. BASOPHILS 0.0 0.0 - 0.1 K/UL  
 ABS. IMM. GRANS. 0.1 (H) 0.00 - 0.04 K/UL  
 DF SMEAR SCANNED    
 RBC COMMENTS NORMOCYTIC, NORMOCHROMIC METABOLIC PANEL, COMPREHENSIVE Collection Time: 11/06/18  3:12 PM  
Result Value Ref Range Sodium 132 (L) 136 - 145 mmol/L Potassium 5.1 3.5 - 5.1 mmol/L Chloride 95 (L) 97 - 108 mmol/L  
 CO2 10 (LL) 21 - 32 mmol/L Anion gap 27 (H) 5 - 15 mmol/L Glucose 488 (H) 65 - 100 mg/dL  BUN 29 (H) 6 - 20 MG/DL  
 Creatinine 1.26 (H) 0.55 - 1.02 MG/DL  
 BUN/Creatinine ratio 23 (H) 12 - 20 GFR est AA 50 (L) >60 ml/min/1.73m2 GFR est non-AA 41 (L) >60 ml/min/1.73m2 Calcium 8.5 8.5 - 10.1 MG/DL Bilirubin, total 0.7 0.2 - 1.0 MG/DL  
 ALT (SGPT) 24 12 - 78 U/L  
 AST (SGOT) 19 15 - 37 U/L Alk. phosphatase 103 45 - 117 U/L Protein, total 6.7 6.4 - 8.2 g/dL Albumin 3.3 (L) 3.5 - 5.0 g/dL Globulin 3.4 2.0 - 4.0 g/dL A-G Ratio 1.0 (L) 1.1 - 2.2 GLUCOSE, POC Collection Time: 11/06/18  4:52 PM  
Result Value Ref Range Glucose (POC) 341 (H) 65 - 100 mg/dL Performed by Saad Syed Medical Decision Making I am the first provider for this patient. I reviewed the vital signs, available nursing notes, past medical history, past surgical history, family history and social history. Vital Signs-Reviewed the patient's vital signs. Patient Vitals for the past 12 hrs: 
 Temp Pulse Resp BP SpO2  
11/06/18 1630  95 21 141/59 100 % 11/06/18 1500  91 17 133/52 100 % 11/06/18 1431  93 16 143/40 100 % 11/06/18 1420 98.1 °F (36.7 °C) 90 24 148/47 100 % Pulse Oximetry Analysis - 100% on RA Cardiac Monitor:  
Rate: 91 bpm 
Rhythm: Normal Sinus Rhythm Records Reviewed: Nursing Notes, Old Medical Records and Previous Laboratory Studies Provider Notes (Medical Decision Making): DDx: DKA, HHS, dehydration, electrolyte abnormality ED Course:  
Initial assessment performed. The patients presenting problems have been discussed, and they are in agreement with the care plan formulated and outlined with them. I have encouraged them to ask questions as they arise throughout their visit. PROGRESS NOTE: 
4:49 PM 
Pt has a low anion gap, low CO2. Will admit her for DKA. Written by Ivanna Walter ED Scribe, as dictated by Raliegh Freyafouzia Peralta MD. 
 
CONSULT NOTE:  
5:07 PM 
Jose Peralta MD spoke with Trina Vivar MD, Specialty: Hospitalist 
 Discussed pt's hx, disposition, and available diagnostic and imaging results. Reviewed care plans. Consultant will evaluate pt for admission. Written by Amarilys rCaig ED Scribe, as dictated by Magali Burger. Javon Mario MD. 
 
CRITICAL CARE NOTE : 
 
4:55 PM 
 
IMPENDING DETERIORATION -Metabolic ASSOCIATED RISK FACTORS - Metabolic changes, Dehydration and hyperglycemia MANAGEMENT- Bedside Assessment and Supervision of Care INTERPRETATION -  Cardiac Output Measures INTERVENTIONS - Metobolic interventions CASE REVIEW - Hospitalist 
TREATMENT RESPONSE -Improved and Stable PERFORMED BY - Self NOTES   : 
 
I have spent 55 minutes of critical care time involved in lab review, consultations with specialist, family decision- making, bedside attention and documentation. During this entire length of time I was immediately available to the patient . Jose Mario MD 
 
Disposition: 
ADMIT NOTE: 
5:07 PM 
The patient is being admitted to the hospital by Olamide Carranza MD. The results of their tests and reasons for their admission have been discussed with the patient and/or available family. They convey agreement and understanding for the need to be admitted and for their admission diagnosis. PLAN: 
1. Admit to hospitalist 
 
Diagnosis Clinical Impression: 1. Diabetic ketoacidosis without coma associated with other specified diabetes mellitus ([facility] [address] Attestations: This note is prepared by Awais Breen, acting as Scribe for Magali Burger. [address] Javon Mario MD: The scribe's documentation has been prepared under my direction and personally reviewed by me in its entirety. I confirm that the note above accurately reflects all work, treatment, procedures, and medical decision making performed by me.

## 2018-11-06 NOTE — ED TRIAGE NOTES
Patient arrived via EMS for high blood sugar, patient had nurse come to her home for care and nurse found her in 400's. EMS states patient is usually low blood sugar. Patient is complaining of fatigue, no other complaint.

## 2018-11-06 NOTE — PROGRESS NOTES
Pharmacy Clarification of Prior to Admission Medication Regimen The patient was interviewed regarding clarification of the prior to admission medication regimen. Daughter was present in room and obtained permission from patient to discuss drug regimen with visitor(s) present. Patient was questioned regarding use of any other inhalers, topical products, over the counter medications, herbal medications, vitamin products or ophthalmic/nasal/otic medication use. Information Obtained From: Patient, Inell Mings Pertinent Pharmacy Findings: 
? insulin aspart U-100 (NOVOLOG) 100 unit/mL inpn: Patient was prescribed this agent on 18, and as of 18, patient has not started this agent. PTA medication list was corrected to the following:  
 
Prior to Admission Medications Prescriptions Last Dose Informant Patient Reported? Taking?  
acetaminophen (TYLENOL) 500 mg tablet 2018 at Unknown time Self Yes Yes Sig: Take 500 mg by mouth every six (6) hours as needed for Pain. amLODIPine (NORVASC) 10 mg tablet 2018 at Unknown time Self No Yes Sig: TAKE 1 TABLET BY MOUTH EVERY MORNING  
brimonidine (ALPHAGAN) 0.15 % ophthalmic solution 2018 at Unknown time Self Yes Yes Sig: Administer 1 Drop to both eyes two (2) times a day. clopidogrel (PLAVIX) 75 mg tab 2018 at Unknown time Self No Yes Sig: TAKE 1 TAB BY MOUTH DAILY. fenofibrate nanocrystallized (TRICOR) 145 mg tablet 2018 at Unknown time Self Yes Yes Sig: Take 145 mg by mouth daily. insulin aspart U-100 (NOVOLOG) 100 unit/mL inpn Not Taking at Unknown time Self No No  
Si units AC breakfast and lunch1  
insulin degludec (TRESIBA FLEXTOUCH U-100) 100 unit/mL (3 mL) inpn 2018 at Unknown time Self Yes Yes Si Units by SubCUTAneous route daily. levothyroxine (SYNTHROID) 150 mcg tablet 2018 at Unknown time Self No Yes Sig: TAKE 1 TABLET BY MOUTH EVERY DAY  
 losartan-hydroCHLOROthiazide (HYZAAR) 100-25 mg per tablet 11/6/2018 at Unknown time Self No Yes Sig: TAKE 1 TABLET BY MOUTH DAILY pravastatin (PRAVACHOL) 80 mg tablet 11/6/2018 at Unknown time Self No Yes Sig: Take 1 Tab by mouth daily. Facility-Administered Medications: None Thank you, 
Oscar Lainez CPhT Medication History Pharmacy Technician

## 2018-11-06 NOTE — H&P
Hospitalist Admission NoteNAME: Maria Elena Solo :  1940 MRN:  [id number] Date/Time:  2018 5:45 PM 
 
Patient PCP: Inocencia Lindo MD 
______________________________________________________________________ Given the patient's current clinical presentation, I have a high level of concern for decompensation if discharged from the emergency department. Complex decision making was performed, which includes reviewing the patient's available past medical records, laboratory results, and x-ray films. My assessment of this patient's clinical condition and my plan of care is as follows. Assessment / Plan: 
DKA POA ? Reason for going into DKA, claims not to take insulin today considering blood sugar was running high. I explained to the patient, it's the right time to have insulin once blood sugar are high Concern for compliance though claims to be taking insulin as directed Admit to PCU Will give 1L NS bolus and start NS 125ml/hr Start insulin ggt Q4H BMP Add K once K < 3.5 and add D5 when BS < 250 and still in DKA Check UA May need to have family check on her if she is taking her insulin at home every day Dyslipidemia Continue statins Hypertension Continue Norvasc while holding ACE inh and HCTZ for now May need to consider stopping HCTZ consider dehydration could have driven her to DKA PRn nitropaste Hypothyroidism Continue synthroid [address] slightly higher then her baseline 
monitor Vascular dementia without behavioral disturbance Code Status: full Surrogate Decision Maker: Daughter DVT Prophylaxis: Lovenox Subjective: CHIEF COMPLAINT: home nurse told her to come to hospital for elevated blood sugars HISTORY OF PRESENT ILLNESS:    
Maria Elena Solo is a 69 y.o.    female who presents to ED because her nursing visiting home told her to come to ED because blood sugar was running high. Pt reported she didn't take insulin because her blood sugars were running high. Pt reported otherwise compliance with insulin. She noted history of vascular dementia listed in the chart. Pt denies any fever, chills, nausea, vomiting, diarrhea, chest pain, cough, shortness of breath. In ED pt noted to have elevated blood sugar, anion gap and low bicarb. We were asked to admit for work up and evaluation of the above problems. Past Medical History:  
Diagnosis Date  Diabetes ([facility] [address]  Heart failure ([facility] [address]   
 unknown to family  Hypercholesteremia  Hypertension  Stroke ([facility] [address]  Thyroid disease Past Surgical History:  
Procedure Laterality Date  HX GYN    
 HX HEENT    
 thyroidectomy  HX HYSTERECTOMY  REMOVAL GALLBLADDER    
 THYROIDECTOMY Social History Tobacco Use  Smoking status: Never Smoker  Smokeless tobacco: Never Used Substance Use Topics  Alcohol use: No  
  Comment: been years Family History Problem Relation Age of Onset  Diabetes Father  No Known Problems Mother No Known Allergies Prior to Admission medications Medication Sig Start Date End Date Taking? Authorizing Provider  
insulin degludec (TRESIBA FLEXTOUCH U-100) 100 unit/mL (3 mL) inpn 20 Units by SubCUTAneous route daily. Yes Other, MD Stanford  
losartan-hydroCHLOROthiazide (HYZAAR) 100-25 mg per tablet TAKE 1 TABLET BY MOUTH DAILY 10/26/18  Yes Lubna Tucker MD  
pravastatin (PRAVACHOL) 80 mg tablet Take 1 Tab by mouth daily. 10/26/18  Yes Lubna Tucker MD  
acetaminophen (TYLENOL) 500 mg tablet Take 500 mg by mouth every six (6) hours as needed for Pain. Yes Provider, Historical  
fenofibrate nanocrystallized (TRICOR) 145 mg tablet Take 145 mg by mouth daily.    Yes Provider, Historical  
levothyroxine (SYNTHROID) 150 mcg tablet TAKE 1 TABLET BY MOUTH EVERY DAY 10/21/18  Yes Lubna Tucker MD  
 amLODIPine (NORVASC) 10 mg tablet TAKE 1 TABLET BY MOUTH EVERY MORNING 10/21/18  Yes Cora Hamm MD  
clopidogrel (PLAVIX) 75 mg tab TAKE 1 TAB BY MOUTH DAILY. 10/21/18  Yes Cora Hamm MD  
brimonidine (ALPHAGAN) 0.15 % ophthalmic solution Administer 1 Drop to both eyes two (2) times a day. 1/30/15  Yes Provider, Historical  
insulin aspart U-100 (NOVOLOG) 100 unit/mL inpn 2 units AC breakfast and lunch1 11/1/18   Cora Hamm MD  
 
 
REVIEW OF SYSTEMS:    
I am not able to complete the review of systems because: The patient is intubated and sedated The patient has altered mental status due to his acute medical problems The patient has baseline aphasia from prior stroke(s) The patient has baseline dementia and is not reliable historian The patient is in acute medical distress and unable to provide information Total of 12 systems reviewed as follows:   
   POSITIVE= underlined text  Negative = text not underlined General:  fever, chills, sweats, generalized weakness, weight loss/gain,  
   loss of appetite Eyes:    blurred vision, eye pain, loss of vision, double vision ENT:    rhinorrhea, pharyngitis Respiratory:   cough, sputum production, SOB, AGUILERA, wheezing, pleuritic pain  
Cardiology:   chest pain, palpitations, orthopnea, PND, edema, syncope Gastrointestinal:  abdominal pain , N/V, diarrhea, dysphagia, constipation, bleeding Genitourinary:  frequency, urgency, dysuria, hematuria, incontinence Muskuloskeletal :  arthralgia, myalgia, back pain Hematology:  easy bruising, nose or gum bleeding, lymphadenopathy Dermatological: rash, ulceration, pruritis, color change / jaundice Endocrine:   hot flashes or polydipsia Neurological:  headache, dizziness, confusion, focal weakness, paresthesia, Speech difficulties, memory loss, gait difficulty Psychological: Feelings of anxiety, depression, agitation Objective: VITALS:   
Visit Vitals /59 Pulse 95 Temp 98.1 °F (36.7 °C) Resp 21 Ht 5' 3\" (1.6 m) Wt 77.1 kg (170 lb) SpO2 100% BMI 30.11 kg/m² PHYSICAL EXAM: 
 
General:    Alert, cooperative, no distress, appears stated age. HEENT: Atraumatic, anicteric sclerae, pink conjunctivae No oral ulcers, mucosa dry, throat clear, dentition fair Neck:  Supple, symmetrical,  thyroid: non tender Lungs:   Clear to auscultation bilaterally. No Wheezing or Rhonchi. No rales. Chest wall:  No tenderness  No Accessory muscle use. Heart:   Regular  rhythm,  No  murmur   No edema Abdomen:   Soft, non-tender. Not distended. Bowel sounds normal 
Extremities: No cyanosis. No clubbing,   
  Skin turgor normal, Capillary refill normal, Radial dial pulse 2+ Skin:     Not pale. Not Jaundiced  No rashes Psych:  Good insight. Not depressed. Not anxious or agitated. Neurologic: EOMs intact. No facial asymmetry. No aphasia or slurred speech. Symmetrical strength, Sensation grossly intact. Alert and oriented X 4.  
 
_______________________________________________________________________ Care Plan discussed with: 
  Comments Patient y Family RN y   
Care Manager Consultant:  kelly ED physician  
_______________________________________________________________________ Expected  Disposition:  
Home with Family y HH/PT/OT/RN   
SNF/LTC   
TAYA   
________________________________________________________________________ TOTAL TIME:  55 Minutes Critical Care Provided   55  Minutes non procedure based Comments  
 y Reviewed previous records  
>50% of visit spent in counseling and coordination of care y Discussion with patient and family and questions answered 
  
 
________________________________________________________________________ Signed: Bobo Carney MD 
 
Procedures: see electronic medical records for all procedures/Xrays and details which were not copied into this note but were reviewed prior to creation of Plan. LAB DATA REVIEWED:   
Recent Results (from the past 24 hour(s)) GLUCOSE, POC Collection Time: 11/06/18  2:30 PM  
Result Value Ref Range Glucose (POC) 475 (H) 65 - 100 mg/dL Performed by Nupur Bright CBC WITH AUTOMATED DIFF Collection Time: 11/06/18  3:12 PM  
Result Value Ref Range WBC 7.7 3.6 - 11.0 K/uL  
 RBC 3.42 (L) 3.80 - 5.20 M/uL  
 HGB 10.7 (L) 11.5 - 16.0 g/dL HCT 33.6 (L) 35.0 - 47.0 % MCV 98.2 80.0 - 99.0 FL  
 MCH 31.3 26.0 - 34.0 PG  
 MCHC 31.8 30.0 - 36.5 g/dL  
 RDW 13.9 11.5 - 14.5 % PLATELET 314 859 - 010 K/uL MPV 9.7 8.9 - 12.9 FL  
 NRBC 0.0 0  WBC ABSOLUTE NRBC 0.00 0.00 - 0.01 K/uL NEUTROPHILS 82 (H) 32 - 75 % LYMPHOCYTES 10 (L) 12 - 49 % MONOCYTES 7 5 - 13 % EOSINOPHILS 0 0 - 7 % BASOPHILS 0 0 - 1 % IMMATURE GRANULOCYTES 1 (H) 0.0 - 0.5 % ABS. NEUTROPHILS 6.3 1.8 - 8.0 K/UL  
 ABS. LYMPHOCYTES 0.8 0.8 - 3.5 K/UL  
 ABS. MONOCYTES 0.5 0.0 - 1.0 K/UL  
 ABS. EOSINOPHILS 0.0 0.0 - 0.4 K/UL  
 ABS. BASOPHILS 0.0 0.0 - 0.1 K/UL  
 ABS. IMM. GRANS. 0.1 (H) 0.00 - 0.04 K/UL  
 DF SMEAR SCANNED    
 RBC COMMENTS NORMOCYTIC, NORMOCHROMIC METABOLIC PANEL, COMPREHENSIVE Collection Time: 11/06/18  3:12 PM  
Result Value Ref Range Sodium 132 (L) 136 - 145 mmol/L Potassium 5.1 3.5 - 5.1 mmol/L Chloride 95 (L) 97 - 108 mmol/L  
 CO2 10 (LL) 21 - 32 mmol/L Anion gap 27 (H) 5 - 15 mmol/L Glucose 488 (H) 65 - 100 mg/dL BUN 29 (H) 6 - 20 MG/DL Creatinine 1.26 (H) 0.55 - 1.02 MG/DL  
 BUN/Creatinine ratio 23 (H) 12 - 20 GFR est AA 50 (L) >60 ml/min/1.73m2 GFR est non-AA 41 (L) >60 ml/min/1.73m2 Calcium 8.5 8.5 - 10.1 MG/DL Bilirubin, total 0.7 0.2 - 1.0 MG/DL  
 ALT (SGPT) 24 12 - 78 U/L  
 AST (SGOT) 19 15 - 37 U/L Alk. phosphatase 103 45 - 117 U/L Protein, total 6.7 6.4 - 8.2 g/dL Albumin 3.3 (L) 3.5 - 5.0 g/dL Globulin 3.4 2.0 - 4.0 g/dL A-G Ratio 1.0 (L) 1.1 - 2.2 GLUCOSE, POC Collection Time: 11/06/18  4:52 PM  
Result Value Ref Range Glucose (POC) 341 (H) 65 - 100 mg/dL Performed by Dorys Duron

## 2018-11-07 ENCOUNTER — HOME CARE VISIT (OUTPATIENT)
Dept: HOME HEALTH SERVICES | Facility: HOME HEALTH | Age: 78
End: 2018-11-07
Payer: MEDICARE

## 2018-11-07 VITALS
HEART RATE: 93 BPM | OXYGEN SATURATION: 98 % | TEMPERATURE: 98.9 F | SYSTOLIC BLOOD PRESSURE: 140 MMHG | RESPIRATION RATE: 28 BRPM | DIASTOLIC BLOOD PRESSURE: 40 MMHG

## 2018-11-07 LAB
ALBUMIN SERPL-MCNC: 2.8 G/DL (ref 3.5–5)
ALBUMIN SERPL-MCNC: 2.8 G/DL (ref 3.5–5)
ALBUMIN/GLOB SERPL: 0.9 {RATIO} (ref 1.1–2.2)
ALBUMIN/GLOB SERPL: 1 {RATIO} (ref 1.1–2.2)
ALP SERPL-CCNC: 71 U/L (ref 45–117)
ALP SERPL-CCNC: 74 U/L (ref 45–117)
ALT SERPL-CCNC: 19 U/L (ref 12–78)
ALT SERPL-CCNC: 20 U/L (ref 12–78)
ANION GAP SERPL CALC-SCNC: 12 MMOL/L (ref 5–15)
ANION GAP SERPL CALC-SCNC: 12 MMOL/L (ref 5–15)
APPEARANCE UR: CLEAR
AST SERPL-CCNC: 15 U/L (ref 15–37)
AST SERPL-CCNC: 16 U/L (ref 15–37)
BACTERIA URNS QL MICRO: NEGATIVE /HPF
BASOPHILS # BLD: 0 K/UL (ref 0–0.1)
BASOPHILS NFR BLD: 0 % (ref 0–1)
BILIRUB SERPL-MCNC: 0.4 MG/DL (ref 0.2–1)
BILIRUB SERPL-MCNC: 0.5 MG/DL (ref 0.2–1)
BILIRUB UR QL CFM: NEGATIVE
BUN SERPL-MCNC: 24 MG/DL (ref 6–20)
BUN SERPL-MCNC: 25 MG/DL (ref 6–20)
BUN/CREAT SERPL: 24 (ref 12–20)
BUN/CREAT SERPL: 25 (ref 12–20)
CALCIUM SERPL-MCNC: 7.8 MG/DL (ref 8.5–10.1)
CALCIUM SERPL-MCNC: 8.1 MG/DL (ref 8.5–10.1)
CHLORIDE SERPL-SCNC: 105 MMOL/L (ref 97–108)
CHLORIDE SERPL-SCNC: 105 MMOL/L (ref 97–108)
CO2 SERPL-SCNC: 22 MMOL/L (ref 21–32)
CO2 SERPL-SCNC: 22 MMOL/L (ref 21–32)
COLOR UR: ABNORMAL
CREAT SERPL-MCNC: 0.98 MG/DL (ref 0.55–1.02)
CREAT SERPL-MCNC: 1.01 MG/DL (ref 0.55–1.02)
DIFFERENTIAL METHOD BLD: ABNORMAL
EOSINOPHIL # BLD: 0.1 K/UL (ref 0–0.4)
EOSINOPHIL NFR BLD: 1 % (ref 0–7)
EPITH CASTS URNS QL MICRO: ABNORMAL /LPF
ERYTHROCYTE [DISTWIDTH] IN BLOOD BY AUTOMATED COUNT: 13.9 % (ref 11.5–14.5)
GLOBULIN SER CALC-MCNC: 2.9 G/DL (ref 2–4)
GLOBULIN SER CALC-MCNC: 3 G/DL (ref 2–4)
GLUCOSE BLD STRIP.AUTO-MCNC: 101 MG/DL (ref 65–100)
GLUCOSE BLD STRIP.AUTO-MCNC: 106 MG/DL (ref 65–100)
GLUCOSE BLD STRIP.AUTO-MCNC: 108 MG/DL (ref 65–100)
GLUCOSE BLD STRIP.AUTO-MCNC: 112 MG/DL (ref 65–100)
GLUCOSE BLD STRIP.AUTO-MCNC: 112 MG/DL (ref 65–100)
GLUCOSE BLD STRIP.AUTO-MCNC: 125 MG/DL (ref 65–100)
GLUCOSE BLD STRIP.AUTO-MCNC: 166 MG/DL (ref 65–100)
GLUCOSE BLD STRIP.AUTO-MCNC: 395 MG/DL (ref 65–100)
GLUCOSE BLD STRIP.AUTO-MCNC: 93 MG/DL (ref 65–100)
GLUCOSE BLD STRIP.AUTO-MCNC: 99 MG/DL (ref 65–100)
GLUCOSE SERPL-MCNC: 106 MG/DL (ref 65–100)
GLUCOSE SERPL-MCNC: 96 MG/DL (ref 65–100)
GLUCOSE UR STRIP.AUTO-MCNC: 250 MG/DL
HCT VFR BLD AUTO: 29.7 % (ref 35–47)
HGB BLD-MCNC: 10 G/DL (ref 11.5–16)
HGB UR QL STRIP: NEGATIVE
HYALINE CASTS URNS QL MICRO: ABNORMAL /LPF (ref 0–5)
IMM GRANULOCYTES # BLD: 0 K/UL (ref 0–0.04)
IMM GRANULOCYTES NFR BLD AUTO: 0 % (ref 0–0.5)
KETONES UR QL STRIP.AUTO: 15 MG/DL
LEUKOCYTE ESTERASE UR QL STRIP.AUTO: NEGATIVE
LYMPHOCYTES # BLD: 1.4 K/UL (ref 0.8–3.5)
LYMPHOCYTES NFR BLD: 27 % (ref 12–49)
MAGNESIUM SERPL-MCNC: 2 MG/DL (ref 1.6–2.4)
MCH RBC QN AUTO: 31.5 PG (ref 26–34)
MCHC RBC AUTO-ENTMCNC: 33.7 G/DL (ref 30–36.5)
MCV RBC AUTO: 93.7 FL (ref 80–99)
MONOCYTES # BLD: 0.6 K/UL (ref 0–1)
MONOCYTES NFR BLD: 12 % (ref 5–13)
NEUTS SEG # BLD: 3.2 K/UL (ref 1.8–8)
NEUTS SEG NFR BLD: 60 % (ref 32–75)
NITRITE UR QL STRIP.AUTO: NEGATIVE
NRBC # BLD: 0 K/UL (ref 0–0.01)
NRBC BLD-RTO: 0 PER 100 WBC
PH UR STRIP: 5 [PH] (ref 5–8)
PLATELET # BLD AUTO: 287 K/UL (ref 150–400)
PMV BLD AUTO: 9.7 FL (ref 8.9–12.9)
POTASSIUM SERPL-SCNC: 3.5 MMOL/L (ref 3.5–5.1)
POTASSIUM SERPL-SCNC: 3.7 MMOL/L (ref 3.5–5.1)
PROT SERPL-MCNC: 5.7 G/DL (ref 6.4–8.2)
PROT SERPL-MCNC: 5.8 G/DL (ref 6.4–8.2)
PROT UR STRIP-MCNC: NEGATIVE MG/DL
RBC # BLD AUTO: 3.17 M/UL (ref 3.8–5.2)
RBC #/AREA URNS HPF: ABNORMAL /HPF (ref 0–5)
SERVICE CMNT-IMP: ABNORMAL
SERVICE CMNT-IMP: NORMAL
SERVICE CMNT-IMP: NORMAL
SODIUM SERPL-SCNC: 139 MMOL/L (ref 136–145)
SODIUM SERPL-SCNC: 139 MMOL/L (ref 136–145)
SP GR UR REFRACTOMETRY: 1.02 (ref 1–1.03)
UA: UC IF INDICATED,UAUC: ABNORMAL
UROBILINOGEN UR QL STRIP.AUTO: 0.2 EU/DL (ref 0.2–1)
WBC # BLD AUTO: 5.4 K/UL (ref 3.6–11)
WBC URNS QL MICRO: ABNORMAL /HPF (ref 0–4)

## 2018-11-07 PROCEDURE — 83735 ASSAY OF MAGNESIUM: CPT | Performed by: INTERNAL MEDICINE

## 2018-11-07 PROCEDURE — 74011250637 HC RX REV CODE- 250/637: Performed by: INTERNAL MEDICINE

## 2018-11-07 PROCEDURE — 74011250636 HC RX REV CODE- 250/636: Performed by: HOSPITALIST

## 2018-11-07 PROCEDURE — 80053 COMPREHEN METABOLIC PANEL: CPT | Performed by: INTERNAL MEDICINE

## 2018-11-07 PROCEDURE — 36415 COLL VENOUS BLD VENIPUNCTURE: CPT | Performed by: INTERNAL MEDICINE

## 2018-11-07 PROCEDURE — 65390000012 HC CONDITION CODE 44 OBSERVATION

## 2018-11-07 PROCEDURE — 99218 HC RM OBSERVATION: CPT

## 2018-11-07 PROCEDURE — 74011636637 HC RX REV CODE- 636/637: Performed by: INTERNAL MEDICINE

## 2018-11-07 PROCEDURE — 3331090001 HH PPS REVENUE CREDIT

## 2018-11-07 PROCEDURE — 81001 URINALYSIS AUTO W/SCOPE: CPT | Performed by: INTERNAL MEDICINE

## 2018-11-07 PROCEDURE — 74011250636 HC RX REV CODE- 250/636: Performed by: INTERNAL MEDICINE

## 2018-11-07 PROCEDURE — 3331090002 HH PPS REVENUE DEBIT

## 2018-11-07 PROCEDURE — 82962 GLUCOSE BLOOD TEST: CPT

## 2018-11-07 PROCEDURE — 85025 COMPLETE CBC W/AUTO DIFF WBC: CPT | Performed by: INTERNAL MEDICINE

## 2018-11-07 RX ORDER — DEXTROSE, SODIUM CHLORIDE, AND POTASSIUM CHLORIDE 5; .45; .15 G/100ML; G/100ML; G/100ML
75 INJECTION INTRAVENOUS CONTINUOUS
Status: DISCONTINUED | OUTPATIENT
Start: 2018-11-07 | End: 2018-11-07

## 2018-11-07 RX ORDER — INSULIN GLARGINE 100 [IU]/ML
20 INJECTION, SOLUTION SUBCUTANEOUS
Status: DISCONTINUED | OUTPATIENT
Start: 2018-11-07 | End: 2018-11-07

## 2018-11-07 RX ORDER — INSULIN LISPRO 100 [IU]/ML
6 INJECTION, SOLUTION INTRAVENOUS; SUBCUTANEOUS ONCE
Status: COMPLETED | OUTPATIENT
Start: 2018-11-07 | End: 2018-11-07

## 2018-11-07 RX ORDER — INSULIN GLARGINE 100 [IU]/ML
24 INJECTION, SOLUTION SUBCUTANEOUS DAILY
Status: DISCONTINUED | OUTPATIENT
Start: 2018-11-07 | End: 2018-11-08

## 2018-11-07 RX ORDER — POTASSIUM CHLORIDE AND SODIUM CHLORIDE 450; 150 MG/100ML; MG/100ML
INJECTION, SOLUTION INTRAVENOUS CONTINUOUS
Status: DISCONTINUED | OUTPATIENT
Start: 2018-11-07 | End: 2018-11-09

## 2018-11-07 RX ADMIN — Medication 10 ML: at 06:59

## 2018-11-07 RX ADMIN — DEXTROSE MONOHYDRATE, SODIUM CHLORIDE, AND POTASSIUM CHLORIDE 150 ML/HR: 50; 4.5; 1.49 INJECTION, SOLUTION INTRAVENOUS at 06:58

## 2018-11-07 RX ADMIN — DEXTROSE MONOHYDRATE, SODIUM CHLORIDE, AND POTASSIUM CHLORIDE 150 ML/HR: 50; 4.5; 1.49 INJECTION, SOLUTION INTRAVENOUS at 00:28

## 2018-11-07 RX ADMIN — INSULIN LISPRO 6 UNITS: 100 INJECTION, SOLUTION INTRAVENOUS; SUBCUTANEOUS at 12:58

## 2018-11-07 RX ADMIN — Medication 10 ML: at 21:05

## 2018-11-07 RX ADMIN — INSULIN LISPRO 1 UNITS: 100 INJECTION, SOLUTION INTRAVENOUS; SUBCUTANEOUS at 12:58

## 2018-11-07 RX ADMIN — BRIMONIDINE TARTRATE 1 DROP: 2 SOLUTION/ DROPS OPHTHALMIC at 09:20

## 2018-11-07 RX ADMIN — ENOXAPARIN SODIUM 40 MG: 40 INJECTION, SOLUTION INTRAVENOUS; SUBCUTANEOUS at 21:04

## 2018-11-07 RX ADMIN — LEVOTHYROXINE SODIUM 150 MCG: 150 TABLET ORAL at 06:57

## 2018-11-07 RX ADMIN — INSULIN LISPRO 1 UNITS: 100 INJECTION, SOLUTION INTRAVENOUS; SUBCUTANEOUS at 17:09

## 2018-11-07 RX ADMIN — INSULIN GLARGINE 24 UNITS: 100 INJECTION, SOLUTION SUBCUTANEOUS at 09:47

## 2018-11-07 RX ADMIN — FENOFIBRATE 145 MG: 145 TABLET ORAL at 09:20

## 2018-11-07 RX ADMIN — BRIMONIDINE TARTRATE 1 DROP: 2 SOLUTION/ DROPS OPHTHALMIC at 21:04

## 2018-11-07 RX ADMIN — CLOPIDOGREL BISULFATE 75 MG: 75 TABLET ORAL at 09:20

## 2018-11-07 RX ADMIN — AMLODIPINE BESYLATE 10 MG: 5 TABLET ORAL at 09:20

## 2018-11-07 RX ADMIN — PRAVASTATIN SODIUM 80 MG: 40 TABLET ORAL at 09:20

## 2018-11-07 RX ADMIN — Medication 10 ML: at 16:05

## 2018-11-07 RX ADMIN — SODIUM CHLORIDE AND POTASSIUM CHLORIDE: 4.5; 1.49 INJECTION, SOLUTION INTRAVENOUS at 09:44

## 2018-11-07 NOTE — PROGRESS NOTES
**Consult Information** 
Member Facility: [facility] Facility MRN: [id number] Consult ID: [id number] Facility Time Zone: ET 
Date and Time of Consult: 11/06/2018 08:46:49 PM 
Requesting Clinician: fransisco galicia Time of Call : 11/06/2018 08:58:00 PM 
Patient Name: Kalyan Meyer Date of Birth: [id number] Gender: Female **Clinical Note** Clinical Note: patient is getting treatment for DKA -  now while on insulin infusion. D51/2 NS was started at 125 ml per hour

## 2018-11-07 NOTE — PROGRESS NOTES
Bedside shift change report given to Venancio Bhagat RN (oncoming nurse) by Charleston Sacks RN (offgoing nurse). Report included the following information SBAR, Kardex, MAR, Recent Results and Cardiac Rhythm NSR Primary Nurse Venancio Bhagat RN and Charleston Sacks, RN performed a dual skin assessment on this patient No impairment noted Madhu score is 21'.  
 
00:20  Dr. Elie Self notified of low blood sugars, and insulin gtt being paused since 21:26. MD reviewing chart. Will follow MD orders. Venancio Bhagat RN 
 
07:26  Bedside shift change report given to Kristen Braden RN (oncoming nurse) by Venancio Bhagat RN (offgoing nurse). Report included the following information SBAR, Kardex, MAR, Recent Results and Cardiac Rhythm NSR.

## 2018-11-07 NOTE — PROGRESS NOTES
PCU SHIFT NURSING NOTE Bedside and Verbal shift change report given to Christos Brand RN (oncoming nurse) by Neel Wiley RN (offgoing nurse). Report included the following information SBAR, Kardex, MAR, Recent Results and Cardiac Rhythm NSR. Shift Summary:  
0730:  Received pt resting in bed. Insulin drip in on standby due to [employer]. Will continue to monitor. 0800:  Dr. Reddy Rodríguez at bedside. Will decrease IV fluid rates. Per Dr. Reddy Rodríguez stop insulin drip. He will place orders for Lantus, and SSI. He wants an HS snack to equal 350 calories. No SSI at bedtime. He will place diet order. 1210:  Spoke with Dr. Reddy Rodríguez about elevated glucose (395)  Received order for 6 units Humalog SQ and to use carb coverage. 1630:  Pt up in chair no signs of distress. Admission Date 11/6/2018 Admission Diagnosis DKA (diabetic ketoacidoses) ([facility] [address] Consults IP CONSULT TO HOSPITALIST 
IP CONSULT TO PRIMARY CARE PROVIDER Consults [x]PT [x]OT []Speech [x]Case Management  
  
[] Palliative Cardiac Monitoring Order  
[x]Yes []No  
 
IV drips []Yes Drip:                            Dose: 
Drip:                            Dose: 
Drip:                            Dose:  
[x]No  
 
GI Prophylaxis []Yes  
[x]No  
 
 
 
DVT Prophylaxis SCDs:     
     
 Brennon stockings:     
  
[x] Medication []Contraindicated []None Activity Level Activity Level: Up with Assistance Activity Assistance: No assistance needed Purposeful Rounding every 1-2 hour? [x]Yes Buckner Score  Total Score: 1 Bed Alarm (If score 3 or >) []Yes  
[] Refused (See signed refusal form in chart) Madhu Score  Madhu Score: 21 Madhu Score (if score 14 or less) []PMT consult  
[]Wound Care consult []Specialty bed  
[] Nutrition consult Needs prior to discharge:  
Home O2 required:   
[]Yes  
[x]No  
 If yes, how much O2 required? Other:  
 Last Bowel Movement: Last Bowel Movement Date: 11/06/18 Influenza Vaccine Received Flu Vaccine for Current Season (usually Sept-March): No  
 Patient/Guardian Refused (Notify MD): Yes Pneumonia Vaccine Diet Active Orders Diet DIET DIABETIC CONSISTENT CARB Regular; 2 GM NA (House Low NA) LDAs Peripheral IV 11/06/18 Anterior;Distal;Inferior; Lower;Right Arm (Active) Site Assessment Clean, dry, & intact 11/7/2018  3:00 AM  
Phlebitis Assessment 0 11/7/2018  3:00 AM  
Infiltration Assessment 0 11/7/2018  3:00 AM  
Dressing Status Clean, dry, & intact 11/7/2018  3:00 AM  
Dressing Type Transparent;Tape 11/7/2018  3:00 AM  
Hub Color/Line Status Green; Infusing 11/7/2018  3:00 AM  
   
Peripheral IV 11/06/18 Anterior;Right Wrist (Active) Site Assessment Clean, dry, & intact 11/7/2018  3:00 AM  
Phlebitis Assessment 0 11/7/2018  3:00 AM  
Infiltration Assessment 0 11/7/2018  3:00 AM  
Dressing Status Clean, dry, & intact 11/7/2018  3:00 AM  
Dressing Type Transparent;Tape 11/7/2018  3:00 AM  
Hub Color/Line Status Green;Flushed;Patent;Capped 11/7/2018  3:00 AM  
                  
Urinary Catheter Intake & Output Date 11/06/18 0700 - 11/07/18 0659 11/07/18 0700 - 11/08/18 0318 Shift 4926-8721 9998-1290 24 Hour Total 6976-8873 5300-4452 24 Hour Total  
INTAKE Shift Total(mL/kg) OUTPUT Urine(mL/kg/hr)  400(0.4) 400(0.2) Urine Voided  400 400 Urine Occurrence(s)  2 x 2 x Stool Stool Occurrence(s)  2 x 2 x Shift Total(mL/kg)  400(5.3) 400(5.3) NET  -400 -400 Weight (kg) 77.1 75.8 75.8 75.8 75.8 75.8 Readmission Risk Assessment Tool Score Medium Risk   
      
 20 Total Score 3 Has Seen PCP in Last 6 Months (Yes=3, No=0)  
 4 IP Visits Last 12 Months (1-3=4, 4=9, >4=11) 5 Pt. Coverage (Medicare=5 , Medicaid, or Self-Pay=4) 8 Charlson Comorbidity Score (Age + Comorbid Conditions) Criteria that do not apply: . Living with Significant Other. Assisted Living. LTAC. SNF. or  
Rehab Patient Length of Stay (>5 days = 3) Expected Length of Stay - - - Actual Length of Stay 1

## 2018-11-07 NOTE — PROGRESS NOTES
Informed patient of change from inpatient to observation. Opportunity for questions and signed copy of observation letter and [facility]ASQUE[facility] letter given to her with signed copy on chart.

## 2018-11-07 NOTE — PROGRESS NOTES
General Daily Progress Note Admit Date: 11/6/2018 Subjective:  
 
Patient has no complaint. Current Facility-Administered Medications Medication Dose Route Frequency  insulin glargine (LANTUS) injection 24 Units  24 Units SubCUTAneous DAILY  0.45% sodium chloride 1,000 mL with potassium chloride 20 mEq infusion   IntraVENous CONTINUOUS  
 insulin lispro (HUMALOG) injection   SubCUTAneous TIDAC  
 brimonidine (ALPHAGAN) 0.2 % ophthalmic solution 1 Drop  1 Drop Both Eyes BID  clopidogrel (PLAVIX) tablet 75 mg  75 mg Oral DAILY  fenofibrate nanocrystallized (TRICOR) tablet 145 mg  145 mg Oral DAILY  levothyroxine (SYNTHROID) tablet 150 mcg  150 mcg Oral 6am  
 amLODIPine (NORVASC) tablet 10 mg  10 mg Oral DAILY  pravastatin (PRAVACHOL) tablet 80 mg  80 mg Oral DAILY  sodium chloride (NS) flush 5-10 mL  5-10 mL IntraVENous Q8H  
 sodium chloride (NS) flush 5-10 mL  5-10 mL IntraVENous PRN  
 acetaminophen (TYLENOL) tablet 650 mg  650 mg Oral Q6H PRN  
 ondansetron (ZOFRAN) injection 4 mg  4 mg IntraVENous Q4H PRN  
 enoxaparin (LOVENOX) injection 40 mg  40 mg SubCUTAneous Q24H  
 nitroglycerin (NITROBID) 2 % ointment 1 Inch  1 Inch Topical Q6H PRN Review of Systems A comprehensive review of systems was negative. Objective:  
 
Patient Vitals for the past 24 hrs: 
 BP Temp Pulse Resp SpO2 Height Weight 11/07/18 0729 121/68 98.2 °F (36.8 °C) 77 16 100 %    
11/07/18 0534       167 lb 1.6 oz (75.8 kg) 11/07/18 0300 115/41 98.1 °F (36.7 °C) 79 16 97 %    
11/06/18 2247 (!) 128/37 98.6 °F (37 °C) 82 14 100 %    
11/06/18 1953 (!) 135/30 98.2 °F (36.8 °C) 88 14 100 %    
11/06/18 1853 (!) 133/36 99.3 °F (37.4 °C) 85 16 100 %    
11/06/18 1816 127/50 98.1 °F (36.7 °C) 85 18 100 %    
11/06/18 1630 141/59  95 21 100 %    
11/06/18 1500 133/52  91 17 100 %    
11/06/18 1431 143/40  93 16 100 %   11/06/18 1420 148/47 98.1 °F (36.7 °C) 90 24 100 % 5' 3\" (1.6 m) 170 lb (77.1 kg) No intake/output data recorded. 11/05 1901 - 11/07 0700 In: -  
Out: 400 [Urine:400] Physical Exam:  
Visit Vitals /68 (BP 1 Location: Left arm, BP Patient Position: Sitting) Pulse 77 Temp 98.2 °F (36.8 °C) Resp 16 Ht 5' 3\" (1.6 m) Wt 167 lb 1.6 oz (75.8 kg) SpO2 100% BMI 29.60 kg/m² General appearance: alert, cooperative, no distress, appears stated age Neck: supple, symmetrical, trachea midline, no adenopathy, thyroid: not enlarged, symmetric, no tenderness/mass/nodules, no carotid bruit and no JVD Lungs: clear to auscultation bilaterally Heart: regular rate and rhythm, S1, S2 normal, no murmur, click, rub or gallop Abdomen: soft, non-tender. Bowel sounds normal. No masses,  no organomegaly Extremities: extremities normal, atraumatic, no cyanosis or edema Data Review Recent Results (from the past 24 hour(s)) GLUCOSE, POC Collection Time: 11/06/18  2:30 PM  
Result Value Ref Range Glucose (POC) 475 (H) 65 - 100 mg/dL Performed by Rashid Julio CBC WITH AUTOMATED DIFF Collection Time: 11/06/18  3:12 PM  
Result Value Ref Range WBC 7.7 3.6 - 11.0 K/uL  
 RBC 3.42 (L) 3.80 - 5.20 M/uL  
 HGB 10.7 (L) 11.5 - 16.0 g/dL HCT 33.6 (L) 35.0 - 47.0 % MCV 98.2 80.0 - 99.0 FL  
 MCH 31.3 26.0 - 34.0 PG  
 MCHC 31.8 30.0 - 36.5 g/dL  
 RDW 13.9 11.5 - 14.5 % PLATELET 453 288 - 281 K/uL MPV 9.7 8.9 - 12.9 FL  
 NRBC 0.0 0  WBC ABSOLUTE NRBC 0.00 0.00 - 0.01 K/uL NEUTROPHILS 82 (H) 32 - 75 % LYMPHOCYTES 10 (L) 12 - 49 % MONOCYTES 7 5 - 13 % EOSINOPHILS 0 0 - 7 % BASOPHILS 0 0 - 1 % IMMATURE GRANULOCYTES 1 (H) 0.0 - 0.5 % ABS. NEUTROPHILS 6.3 1.8 - 8.0 K/UL  
 ABS. LYMPHOCYTES 0.8 0.8 - 3.5 K/UL  
 ABS. MONOCYTES 0.5 0.0 - 1.0 K/UL  
 ABS. EOSINOPHILS 0.0 0.0 - 0.4 K/UL  
 ABS. BASOPHILS 0.0 0.0 - 0.1 K/UL ABS. IMM. GRANS. 0.1 (H) 0.00 - 0.04 K/UL  
 DF SMEAR SCANNED    
 RBC COMMENTS NORMOCYTIC, NORMOCHROMIC METABOLIC PANEL, COMPREHENSIVE Collection Time: 11/06/18  3:12 PM  
Result Value Ref Range Sodium 132 (L) 136 - 145 mmol/L Potassium 5.1 3.5 - 5.1 mmol/L Chloride 95 (L) 97 - 108 mmol/L  
 CO2 10 (LL) 21 - 32 mmol/L Anion gap 27 (H) 5 - 15 mmol/L Glucose 488 (H) 65 - 100 mg/dL BUN 29 (H) 6 - 20 MG/DL Creatinine 1.26 (H) 0.55 - 1.02 MG/DL  
 BUN/Creatinine ratio 23 (H) 12 - 20 GFR est AA 50 (L) >60 ml/min/1.73m2 GFR est non-AA 41 (L) >60 ml/min/1.73m2 Calcium 8.5 8.5 - 10.1 MG/DL Bilirubin, total 0.7 0.2 - 1.0 MG/DL  
 ALT (SGPT) 24 12 - 78 U/L  
 AST (SGOT) 19 15 - 37 U/L Alk. phosphatase 103 45 - 117 U/L Protein, total 6.7 6.4 - 8.2 g/dL Albumin 3.3 (L) 3.5 - 5.0 g/dL Globulin 3.4 2.0 - 4.0 g/dL A-G Ratio 1.0 (L) 1.1 - 2.2 GLUCOSE, POC Collection Time: 11/06/18  4:52 PM  
Result Value Ref Range Glucose (POC) 341 (H) 65 - 100 mg/dL Performed by Ramón Lujan URINALYSIS W/MICROSCOPIC Collection Time: 11/06/18  5:22 PM  
Result Value Ref Range Color YELLOW/STRAW Appearance CLEAR CLEAR Specific gravity 1.022 1.003 - 1.030    
 pH (UA) 5.0 5.0 - 8.0 Protein NEGATIVE  NEG mg/dL Glucose >1,000 (A) NEG mg/dL Ketone 80 (A) NEG mg/dL Bilirubin NEGATIVE  NEG Blood NEGATIVE  NEG Urobilinogen 0.2 0.2 - 1.0 EU/dL Nitrites NEGATIVE  NEG Leukocyte Esterase NEGATIVE  NEG    
 WBC 0-4 0 - 4 /hpf  
 RBC 0-5 0 - 5 /hpf Epithelial cells FEW FEW /lpf Bacteria 1+ (A) NEG /hpf METABOLIC PANEL, BASIC Collection Time: 11/06/18  5:22 PM  
Result Value Ref Range Sodium 135 (L) 136 - 145 mmol/L Potassium 4.0 3.5 - 5.1 mmol/L Chloride 100 97 - 108 mmol/L  
 CO2 10 (LL) 21 - 32 mmol/L Anion gap 25 (H) 5 - 15 mmol/L Glucose 298 (H) 65 - 100 mg/dL  BUN 29 (H) 6 - 20 MG/DL  
 Creatinine 1.37 (H) 0.55 - 1.02 MG/DL  
 BUN/Creatinine ratio 21 (H) 12 - 20 GFR est AA 45 (L) >60 ml/min/1.73m2 GFR est non-AA 37 (L) >60 ml/min/1.73m2 Calcium 8.6 8.5 - 10.1 MG/DL MAGNESIUM Collection Time: 11/06/18  5:22 PM  
Result Value Ref Range Magnesium 2.1 1.6 - 2.4 mg/dL PHOSPHORUS Collection Time: 11/06/18  5:22 PM  
Result Value Ref Range Phosphorus 3.8 2.6 - 4.7 MG/DL  
HEMOGLOBIN A1C WITH EAG Collection Time: 11/06/18  5:22 PM  
Result Value Ref Range Hemoglobin A1c 10.1 (H) 4.2 - 6.3 % Est. average glucose 243 mg/dL GLUCOSE, POC Collection Time: 11/06/18  6:16 PM  
Result Value Ref Range Glucose (POC) 273 (H) 65 - 100 mg/dL Performed by Melissa Ballesteros (PCT) GLUCOSE, POC Collection Time: 11/06/18  7:27 PM  
Result Value Ref Range Glucose (POC) 190 (H) 65 - 100 mg/dL Performed by Brooklyn Jurado GLUCOSE, POC Collection Time: 11/06/18  8:36 PM  
Result Value Ref Range Glucose (POC) 128 (H) 65 - 100 mg/dL Performed by Tiffanie Cohn GLUCOSE, POC Collection Time: 11/06/18  9:33 PM  
Result Value Ref Range Glucose (POC) 101 (H) 65 - 100 mg/dL Performed by Tiffanie Cohn GLUCOSE, POC Collection Time: 11/06/18 10:37 PM  
Result Value Ref Range Glucose (POC) 98 65 - 100 mg/dL Performed by Tiffanie Cohn METABOLIC PANEL, BASIC Collection Time: 11/06/18 10:43 PM  
Result Value Ref Range Sodium 138 136 - 145 mmol/L Potassium 3.8 3.5 - 5.1 mmol/L Chloride 104 97 - 108 mmol/L  
 CO2 19 (L) 21 - 32 mmol/L Anion gap 15 5 - 15 mmol/L Glucose 102 (H) 65 - 100 mg/dL BUN 28 (H) 6 - 20 MG/DL Creatinine 1.07 (H) 0.55 - 1.02 MG/DL  
 BUN/Creatinine ratio 26 (H) 12 - 20 GFR est AA >60 >60 ml/min/1.73m2 GFR est non-AA 50 (L) >60 ml/min/1.73m2 Calcium 8.3 (L) 8.5 - 10.1 MG/DL MAGNESIUM Collection Time: 11/06/18 10:43 PM  
Result Value Ref Range Magnesium 2.1 1.6 - 2.4 mg/dL GLUCOSE, POC  
 Collection Time: 11/06/18 11:45 PM  
Result Value Ref Range Glucose (POC) 91 65 - 100 mg/dL Performed by Tiffanie Cohn GLUCOSE, POC Collection Time: 11/07/18 12:48 AM  
Result Value Ref Range Glucose (POC) 99 65 - 100 mg/dL Performed by Tiffanie Cohn GLUCOSE, POC Collection Time: 11/07/18  1:44 AM  
Result Value Ref Range Glucose (POC) 112 (H) 65 - 100 mg/dL Performed by Tiffanie Cohn GLUCOSE, POC Collection Time: 11/07/18  2:59 AM  
Result Value Ref Range Glucose (POC) 101 (H) 65 - 100 mg/dL Performed by Tiffanie Cohn METABOLIC PANEL, COMPREHENSIVE Collection Time: 11/07/18  3:01 AM  
Result Value Ref Range Sodium 139 136 - 145 mmol/L Potassium 3.5 3.5 - 5.1 mmol/L Chloride 105 97 - 108 mmol/L  
 CO2 22 21 - 32 mmol/L Anion gap 12 5 - 15 mmol/L Glucose 96 65 - 100 mg/dL BUN 25 (H) 6 - 20 MG/DL Creatinine 1.01 0.55 - 1.02 MG/DL  
 BUN/Creatinine ratio 25 (H) 12 - 20 GFR est AA >60 >60 ml/min/1.73m2 GFR est non-AA 53 (L) >60 ml/min/1.73m2 Calcium 7.8 (L) 8.5 - 10.1 MG/DL Bilirubin, total 0.5 0.2 - 1.0 MG/DL  
 ALT (SGPT) 19 12 - 78 U/L  
 AST (SGOT) 15 15 - 37 U/L Alk. phosphatase 71 45 - 117 U/L Protein, total 5.7 (L) 6.4 - 8.2 g/dL Albumin 2.8 (L) 3.5 - 5.0 g/dL Globulin 2.9 2.0 - 4.0 g/dL A-G Ratio 1.0 (L) 1.1 - 2.2    
CBC WITH AUTOMATED DIFF Collection Time: 11/07/18  3:01 AM  
Result Value Ref Range WBC 5.4 3.6 - 11.0 K/uL  
 RBC 3.17 (L) 3.80 - 5.20 M/uL  
 HGB 10.0 (L) 11.5 - 16.0 g/dL HCT 29.7 (L) 35.0 - 47.0 % MCV 93.7 80.0 - 99.0 FL  
 MCH 31.5 26.0 - 34.0 PG  
 MCHC 33.7 30.0 - 36.5 g/dL  
 RDW 13.9 11.5 - 14.5 % PLATELET 472 801 - 631 K/uL MPV 9.7 8.9 - 12.9 FL  
 NRBC 0.0 0  WBC ABSOLUTE NRBC 0.00 0.00 - 0.01 K/uL NEUTROPHILS 60 32 - 75 % LYMPHOCYTES 27 12 - 49 % MONOCYTES 12 5 - 13 % EOSINOPHILS 1 0 - 7 % BASOPHILS 0 0 - 1 % IMMATURE GRANULOCYTES 0 0.0 - 0.5 % ABS. NEUTROPHILS 3.2 1.8 - 8.0 K/UL  
 ABS. LYMPHOCYTES 1.4 0.8 - 3.5 K/UL  
 ABS. MONOCYTES 0.6 0.0 - 1.0 K/UL  
 ABS. EOSINOPHILS 0.1 0.0 - 0.4 K/UL  
 ABS. BASOPHILS 0.0 0.0 - 0.1 K/UL  
 ABS. IMM. GRANS. 0.0 0.00 - 0.04 K/UL  
 DF AUTOMATED    
GLUCOSE, POC Collection Time: 11/07/18  4:23 AM  
Result Value Ref Range Glucose (POC) 93 65 - 100 mg/dL Performed by Andrew Hemphill GLUCOSE, POC Collection Time: 11/07/18  5:33 AM  
Result Value Ref Range Glucose (POC) 106 (H) 65 - 100 mg/dL Performed by Elisha Berkowitz METABOLIC PANEL, COMPREHENSIVE Collection Time: 11/07/18  6:37 AM  
Result Value Ref Range Sodium 139 136 - 145 mmol/L Potassium 3.7 3.5 - 5.1 mmol/L Chloride 105 97 - 108 mmol/L  
 CO2 22 21 - 32 mmol/L Anion gap 12 5 - 15 mmol/L Glucose 106 (H) 65 - 100 mg/dL BUN 24 (H) 6 - 20 MG/DL Creatinine 0.98 0.55 - 1.02 MG/DL  
 BUN/Creatinine ratio 24 (H) 12 - 20 GFR est AA >60 >60 ml/min/1.73m2 GFR est non-AA 55 (L) >60 ml/min/1.73m2 Calcium 8.1 (L) 8.5 - 10.1 MG/DL Bilirubin, total 0.4 0.2 - 1.0 MG/DL  
 ALT (SGPT) 20 12 - 78 U/L  
 AST (SGOT) 16 15 - 37 U/L Alk. phosphatase 74 45 - 117 U/L Protein, total 5.8 (L) 6.4 - 8.2 g/dL Albumin 2.8 (L) 3.5 - 5.0 g/dL Globulin 3.0 2.0 - 4.0 g/dL A-G Ratio 0.9 (L) 1.1 - 2.2 MAGNESIUM Collection Time: 11/07/18  6:37 AM  
Result Value Ref Range Magnesium 2.0 1.6 - 2.4 mg/dL GLUCOSE, POC Collection Time: 11/07/18  6:41 AM  
Result Value Ref Range Glucose (POC) 108 (H) 65 - 100 mg/dL Performed by Elisha Berkowitz URINALYSIS W/ REFLEX CULTURE Collection Time: 11/07/18  6:46 AM  
Result Value Ref Range Color YELLOW/STRAW Appearance CLEAR CLEAR Specific gravity 1.019 1.003 - 1.030    
 pH (UA) 5.0 5.0 - 8.0 Protein NEGATIVE  NEG mg/dL Glucose 250 (A) NEG mg/dL Ketone 15 (A) NEG mg/dL  Blood NEGATIVE  NEG    
 Urobilinogen 0.2 0.2 - 1.0 EU/dL Nitrites NEGATIVE  NEG Leukocyte Esterase NEGATIVE  NEG    
 WBC 0-4 0 - 4 /hpf  
 RBC 0-5 0 - 5 /hpf Epithelial cells FEW FEW /lpf Bacteria NEGATIVE  NEG /hpf  
 UA:UC IF INDICATED CULTURE NOT INDICATED BY UA RESULT CNI Hyaline cast 0-2 0 - 5 /lpf  
BILIRUBIN, CONFIRM Collection Time: 11/07/18  6:46 AM  
Result Value Ref Range Bilirubin UA, confirm NEGATIVE  NEG    
GLUCOSE, POC Collection Time: 11/07/18  7:48 AM  
Result Value Ref Range Glucose (POC) 112 (H) 65 - 100 mg/dL Performed by Jefferson Molina Assessment:  
 
Active Problems: 
  Dyslipidemia (2/6/2015) Hypertension (2/6/2015) Hypothyroidism (2/6/2015) DKA (diabetic ketoacidoses) ([facility] [address] (6/2/2017) Vascular dementia without behavioral disturbance (10/20/2018) H/O: CVA (cerebrovascular accident) (11/6/2018) Plan: 1. Transient diabetic ketoacidosis with rapid resolution. Patient can give me no antecedent history. Resume basal insulin with correctional scale insulin. She could not remember any details prior to her admission. Long discussions have occurred with family regarding specific items that are necessary to prevent DKA and minimize significant hyperglycemia. 2.  Continue hydration.

## 2018-11-07 NOTE — PROGRESS NOTES
Reason for Readmission:   Patient came to ed for elevated blood sugar. She states when her home health nurse came out and checked her blood sugar it was 400. Patient denies having any funny feelings. Patient was admitted on 10/19/18 and discharged on 10/23/18 for increased lethargy and confusion . RRAT Score and Risk Level:     20 Level of Readmission:    1 Care Conference scheduled:   Spoke with md and diabetic treatment center. Resources/supports as identified by patient/family:   Patient has two supportive sons and a daughter. Top Challenges facing patient (as identified by patient/family and CM): Finances/Medication cost?   Patient denies having any problems obtaining her medications or financial issues. Transportation    Her family takes her to her follow up appointments. Support system or lack thereof? Supportive family at home. Living arrangements? She lives in two [place] home with her two sons. One of the son's work and the other she states is special needs however he is able to take care of himself and also helps her out. Self-care/ADLs/Cognition? Patient states she was independent in adl's and iadl's prior to coming to the hospital.  She denies using dme at home to assist her in ambulating. Current Advanced Directive/Advance Care Plan:  Not on file. Plan for utilizing home health:   Patient is open to [facility] for nursing and would like to continue when discharged. Likelihood of additional readmission:  Moderate. Transition of Care Plan:    Based on readmission, the patient's previous Plan of Care 
 has been evaluated and/or modified. The current Transition of Care Plan is: Patient will follow up with healthcare team when discharged and will adhere to her diet.  Diabetic treatment team will follow up with her in the hospital.        
 
 
 
 
 Care Management Interventions PCP Verified by CM: Yes Transition of Care Consult (CM Consult): Discharge Planning Discharge Durable Medical Equipment: (Patient has glucometer at home. ) Current Support Network: Other(Lives in her home with two sons who are there. ) Confirm Follow Up Transport: Family Plan discussed with Pt/Family/Caregiver: Yes Discharge Location Discharge Placement: Home

## 2018-11-07 NOTE — PROGRESS NOTES
Ha Espinoza, SILVERIO notified of patient's readmission to [facility]/ Jamilah Torres of Care Management

## 2018-11-07 NOTE — PROGRESS NOTES
**Consult Information** 
Member Facility: [facility] Facility MRN: [id number] Consult ID: [id number] Facility Time Zone: ET 
Date and Time of Consult: 11/07/2018 12:05:54 AM 
Requesting Clinician: Stewart Dancer Time of Call : 11/07/2018 12:16:00 AM 
Patient Name: Tony Torrez Date of Birth: [id number] Gender: Female **Clinical Note** Clinical Note: Pt's blood sugar has been below 100 for past 2 hours. Pt on insulin drip that has been stopped since 21:36 due to blood sugar results. Per protocol, drip should not be held at zero for longer than 1-2 hours. It is recommended to adjust the target range to 140-180 or increase rate of D5 IVF. D5 half-normal saline with 20 mg potassium chloride was started at 150 mL/hour. Last BMP showed much improvement in the anion gap as well as bicarb level. Patient may come off from insulin drip soon.

## 2018-11-08 LAB
ANION GAP SERPL CALC-SCNC: 10 MMOL/L (ref 5–15)
BUN SERPL-MCNC: 28 MG/DL (ref 6–20)
BUN/CREAT SERPL: 34 (ref 12–20)
CALCIUM SERPL-MCNC: 8.4 MG/DL (ref 8.5–10.1)
CHLORIDE SERPL-SCNC: 103 MMOL/L (ref 97–108)
CO2 SERPL-SCNC: 23 MMOL/L (ref 21–32)
CREAT SERPL-MCNC: 0.83 MG/DL (ref 0.55–1.02)
GLUCOSE BLD STRIP.AUTO-MCNC: 126 MG/DL (ref 65–100)
GLUCOSE BLD STRIP.AUTO-MCNC: 145 MG/DL (ref 65–100)
GLUCOSE BLD STRIP.AUTO-MCNC: 158 MG/DL (ref 65–100)
GLUCOSE BLD STRIP.AUTO-MCNC: 164 MG/DL (ref 65–100)
GLUCOSE BLD STRIP.AUTO-MCNC: 167 MG/DL (ref 65–100)
GLUCOSE BLD STRIP.AUTO-MCNC: 168 MG/DL (ref 65–100)
GLUCOSE BLD STRIP.AUTO-MCNC: 50 MG/DL (ref 65–100)
GLUCOSE SERPL-MCNC: 51 MG/DL (ref 65–100)
POTASSIUM SERPL-SCNC: 3.3 MMOL/L (ref 3.5–5.1)
SERVICE CMNT-IMP: ABNORMAL
SODIUM SERPL-SCNC: 136 MMOL/L (ref 136–145)

## 2018-11-08 PROCEDURE — 82962 GLUCOSE BLOOD TEST: CPT

## 2018-11-08 PROCEDURE — 3331090002 HH PPS REVENUE DEBIT

## 2018-11-08 PROCEDURE — 99218 HC RM OBSERVATION: CPT

## 2018-11-08 PROCEDURE — 74011000250 HC RX REV CODE- 250: Performed by: INTERNAL MEDICINE

## 2018-11-08 PROCEDURE — 74011250636 HC RX REV CODE- 250/636: Performed by: INTERNAL MEDICINE

## 2018-11-08 PROCEDURE — 36415 COLL VENOUS BLD VENIPUNCTURE: CPT

## 2018-11-08 PROCEDURE — 3331090001 HH PPS REVENUE CREDIT

## 2018-11-08 PROCEDURE — 74011250637 HC RX REV CODE- 250/637: Performed by: INTERNAL MEDICINE

## 2018-11-08 PROCEDURE — 74011636637 HC RX REV CODE- 636/637: Performed by: INTERNAL MEDICINE

## 2018-11-08 PROCEDURE — 80048 BASIC METABOLIC PNL TOTAL CA: CPT

## 2018-11-08 RX ORDER — INSULIN GLARGINE 100 [IU]/ML
20 INJECTION, SOLUTION SUBCUTANEOUS DAILY
Status: DISCONTINUED | OUTPATIENT
Start: 2018-11-08 | End: 2018-11-10

## 2018-11-08 RX ORDER — INSULIN LISPRO 100 [IU]/ML
2 INJECTION, SOLUTION INTRAVENOUS; SUBCUTANEOUS
Status: DISCONTINUED | OUTPATIENT
Start: 2018-11-08 | End: 2018-11-12 | Stop reason: HOSPADM

## 2018-11-08 RX ADMIN — AMLODIPINE BESYLATE 10 MG: 5 TABLET ORAL at 09:25

## 2018-11-08 RX ADMIN — BRIMONIDINE TARTRATE 1 DROP: 2 SOLUTION/ DROPS OPHTHALMIC at 22:04

## 2018-11-08 RX ADMIN — INSULIN LISPRO 2 UNITS: 100 INJECTION, SOLUTION INTRAVENOUS; SUBCUTANEOUS at 16:56

## 2018-11-08 RX ADMIN — Medication 10 ML: at 21:22

## 2018-11-08 RX ADMIN — BRIMONIDINE TARTRATE 1 DROP: 2 SOLUTION/ DROPS OPHTHALMIC at 08:06

## 2018-11-08 RX ADMIN — SODIUM CHLORIDE AND POTASSIUM CHLORIDE: 4.5; 1.49 INJECTION, SOLUTION INTRAVENOUS at 00:40

## 2018-11-08 RX ADMIN — SODIUM CHLORIDE AND POTASSIUM CHLORIDE: 4.5; 1.49 INJECTION, SOLUTION INTRAVENOUS at 22:04

## 2018-11-08 RX ADMIN — Medication 5 ML: at 13:40

## 2018-11-08 RX ADMIN — Medication 10 ML: at 06:19

## 2018-11-08 RX ADMIN — ENOXAPARIN SODIUM 40 MG: 40 INJECTION, SOLUTION INTRAVENOUS; SUBCUTANEOUS at 21:20

## 2018-11-08 RX ADMIN — PRAVASTATIN SODIUM 80 MG: 40 TABLET ORAL at 09:26

## 2018-11-08 RX ADMIN — CLOPIDOGREL BISULFATE 75 MG: 75 TABLET ORAL at 09:27

## 2018-11-08 RX ADMIN — INSULIN LISPRO 2 UNITS: 100 INJECTION, SOLUTION INTRAVENOUS; SUBCUTANEOUS at 09:29

## 2018-11-08 RX ADMIN — INSULIN LISPRO 2 UNITS: 100 INJECTION, SOLUTION INTRAVENOUS; SUBCUTANEOUS at 12:17

## 2018-11-08 RX ADMIN — LEVOTHYROXINE SODIUM 150 MCG: 150 TABLET ORAL at 06:19

## 2018-11-08 RX ADMIN — FENOFIBRATE 145 MG: 145 TABLET ORAL at 09:26

## 2018-11-08 RX ADMIN — INSULIN GLARGINE 20 UNITS: 100 INJECTION, SOLUTION SUBCUTANEOUS at 09:22

## 2018-11-08 NOTE — PROGRESS NOTES
PCU SHIFT NURSING NOTE Bedside and Verbal shift change report given to Joann Mccoy RN (oncoming nurse) by Steven Torres RN (offgoing nurse). Report included the following information SBAR, Kardex, MAR, Recent Results and Cardiac Rhythm NSR. Shift Summary:  
2459:  Reviewed chart for am labs. On am labs Blood glucose was 51. Checked POC glucose 50. Gave pt 6 oz of orange juice. Will recheck. 0720:  Received report on pt from [address] [place] [address] 3:  Rechecked glucose, 126. Pt drank the juice and ate the pudding, binh cracker snad peanut butter that was at bedside. 26:  Dr. Tejada Pulling in to round an pt. Asked about am dose of Lantus. He will decrease dose to 20 units this am.  Ok to transfer pt to medical unit. 0800:  Students to re-check glucose prior to pt eating meal tray. 1130:  Blood glucose stable. No signs of distress. Call bell in reach. 1635: TRANSFER - OUT REPORT: 
 
Verbal report given to Jessi Silverio RN (name) on Jil Gardner  being transferred to [id number] (unit) for routine progression of care Report consisted of patients Situation, Background, Assessment and  
Recommendations(SBAR). Information from the following report(s) SBAR, Kardex, MAR, Recent Results and Cardiac Rhythm nSR was reviewed with the receiving nurse. Lines:  
Peripheral IV 11/06/18 Anterior;Distal;Inferior; Lower;Right Arm (Active) Site Assessment Clean, dry, & intact 11/8/2018  4:06 PM  
Phlebitis Assessment 0 11/8/2018  4:06 PM  
Infiltration Assessment 0 11/8/2018  4:06 PM  
Dressing Status Clean, dry, & intact 11/8/2018  4:06 PM  
Dressing Type Tape;Transparent 11/8/2018  4:06 PM  
Hub Color/Line Status Green; Infusing 11/8/2018  4:06 PM  
Alcohol Cap Used Yes 11/7/2018  7:20 PM  
   
Peripheral IV 11/06/18 Anterior;Right Wrist (Active) Site Assessment Clean, dry, & intact 11/8/2018  4:06 PM  
Phlebitis Assessment 0 11/8/2018  4:06 PM  
Infiltration Assessment 0 11/8/2018  4:06 PM  
 Dressing Status Clean, dry, & intact 11/8/2018  4:06 PM  
Dressing Type Tape;Transparent 11/8/2018  4:06 PM  
Hub Color/Line Status Pink 11/8/2018  4:06 PM  
Alcohol Cap Used Yes 11/7/2018  7:20 PM  
  
 
Opportunity for questions and clarification was provided. Patient transported with: 
 Sigmascreening Admission Date 11/6/2018 Admission Diagnosis DKA (diabetic ketoacidoses) ([facility] [address] DKA (diabetic ketoacidoses) ([facility] Consults IP CONSULT TO HOSPITALIST 
IP CONSULT TO PRIMARY CARE PROVIDER Consults []PT []OT []Speech [x]Case Management  
  
[] Palliative Cardiac Monitoring Order  
[x]Yes []No  
 
IV drips []Yes Drip:                            Dose: 
Drip:                            Dose: 
Drip:                            Dose:  
[x]No  
 
GI Prophylaxis []Yes  
[x]No  
 
 
 
DVT Prophylaxis SCDs:     
     
 Brennon stockings:     
  
[] Medication []Contraindicated []None Activity Level Activity Level: Up with Assistance Activity Assistance: Partial (one person) Purposeful Rounding every 1-2 hour? [x]Yes Buckner Score  Total Score: 1 Bed Alarm (If score 3 or >) [x]Yes  
[] Refused (See signed refusal form in chart) Madhu Score  Madhu Score: 21 Madhu Score (if score 14 or less) []PMT consult  
[]Wound Care consult []Specialty bed  
[] Nutrition consult Needs prior to discharge:  
Home O2 required:   
[]Yes  
[x]No  
 If yes, how much O2 required? Other:  
 Last Bowel Movement: Last Bowel Movement Date: 11/07/18 Influenza Vaccine Received Flu Vaccine for Current Season (usually Sept-March): No  
 Patient/Guardian Refused (Notify MD): Yes Pneumonia Vaccine Diet Active Orders Diet DIET DIABETIC CONSISTENT CARB Regular; 2 GM NA (House Low NA) LDAs Peripheral IV 11/06/18 Anterior;Distal;Inferior; Lower;Right Arm (Active) Site Assessment Clean, dry, & intact 11/7/2018  7:20 PM  
 Phlebitis Assessment 0 11/7/2018  7:20 PM  
Infiltration Assessment 0 11/7/2018  7:20 PM  
Dressing Status Clean, dry, & intact 11/7/2018  7:20 PM  
Dressing Type Transparent 11/7/2018  7:20 PM  
Hub Color/Line Status Green 11/7/2018  7:20 PM  
Alcohol Cap Used Yes 11/7/2018  7:20 PM  
   
Peripheral IV 11/06/18 Anterior;Right Wrist (Active) Site Assessment Clean, dry, & intact 11/7/2018  7:20 PM  
Phlebitis Assessment 0 11/7/2018  7:20 PM  
Infiltration Assessment 0 11/7/2018  7:20 PM  
Dressing Status Clean, dry, & intact 11/7/2018  7:20 PM  
Dressing Type Transparent 11/7/2018  7:20 PM  
Hub Color/Line Status Pink 11/7/2018  7:20 PM  
Alcohol Cap Used Yes 11/7/2018  7:20 PM  
                  
Urinary Catheter Intake & Output Date 11/07/18 0700 - 11/08/18 0659 11/08/18 0700 - 11/09/18 3080 Shift 4279-5690 6001-6077 24 Hour Total 3983-3755 1641-5073 24 Hour Total  
INTAKE  
I.V.(mL/kg/hr) 595(0.7)  595(0.3) Volume (0.45% sodium chloride 1,000 mL with potassium chloride 20 mEq infusion) 0  0 Volume (0.45% sodium chloride with KCl 20 mEq/L infusion) 595  595 Shift Total(mL/kg) 595(7.9)  595(7.7) OUTPUT Urine(mL/kg/hr) Urine Occurrence(s) 1 x  1 x Stool Stool Occurrence(s) 1 x  1 x Shift Total(mL/kg)   595 Weight (kg) 75.8 76.8 76.8 76.8 76.8 76.8 Readmission Risk Assessment Tool Score Medium Risk   
      
 20 Total Score 3 Has Seen PCP in Last 6 Months (Yes=3, No=0)  
 4 IP Visits Last 12 Months (1-3=4, 4=9, >4=11) 5 Pt. Coverage (Medicare=5 , Medicaid, or Self-Pay=4) 8 Charlson Comorbidity Score (Age + Comorbid Conditions) Criteria that do not apply:  
 . Living with Significant Other. Assisted Living. LTAC. SNF. or  
Rehab Patient Length of Stay (>5 days = 3) Expected Length of Stay 2d 2h Actual Length of Stay 1

## 2018-11-08 NOTE — PROGRESS NOTES
Problem: Falls - Risk of 
Goal: *Absence of Falls Document Colin Carrero Fall Risk and appropriate interventions in the flowsheet. Outcome: Progressing Towards Goal 
Fall Risk Interventions: 
  
 
  
 
Medication Interventions: Patient to call before getting OOB Elimination Interventions: Call light in reach

## 2018-11-08 NOTE — PROGRESS NOTES
General Daily Progress Note Admit Date: 11/6/2018 Subjective:  
 
Patient has no complaint. Current Facility-Administered Medications Medication Dose Route Frequency  insulin glargine (LANTUS) injection 20 Units  20 Units SubCUTAneous DAILY  insulin lispro (HUMALOG) injection 2 Units  2 Units SubCUTAneous TIDAC  
 0.45% sodium chloride with KCl 20 mEq/L infusion   IntraVENous CONTINUOUS  
 brimonidine (ALPHAGAN) 0.2 % ophthalmic solution 1 Drop  1 Drop Both Eyes BID  clopidogrel (PLAVIX) tablet 75 mg  75 mg Oral DAILY  fenofibrate nanocrystallized (TRICOR) tablet 145 mg  145 mg Oral DAILY  levothyroxine (SYNTHROID) tablet 150 mcg  150 mcg Oral 6am  
 amLODIPine (NORVASC) tablet 10 mg  10 mg Oral DAILY  pravastatin (PRAVACHOL) tablet 80 mg  80 mg Oral DAILY  sodium chloride (NS) flush 5-10 mL  5-10 mL IntraVENous Q8H  
 sodium chloride (NS) flush 5-10 mL  5-10 mL IntraVENous PRN  
 acetaminophen (TYLENOL) tablet 650 mg  650 mg Oral Q6H PRN  
 ondansetron (ZOFRAN) injection 4 mg  4 mg IntraVENous Q4H PRN  
 enoxaparin (LOVENOX) injection 40 mg  40 mg SubCUTAneous Q24H  
 nitroglycerin (NITROBID) 2 % ointment 1 Inch  1 Inch Topical Q6H PRN Review of Systems A comprehensive review of systems was negative. Objective:  
 
Patient Vitals for the past 24 hrs: 
 BP Temp Pulse Resp SpO2 Weight 11/08/18 0745 (!) 118/99 98 °F (36.7 °C) 83 16 99 %   
11/08/18 0606      169 lb 5 oz (76.8 kg) 11/07/18 2304 110/68 97.7 °F (36.5 °C) 77 16 100 %   
11/07/18 1920 132/47 97.6 °F (36.4 °C) 74 16 100 %   
11/07/18 1603 138/46 97.8 °F (36.6 °C) 76 16 99 %   
11/07/18 1048 (!) 105/36       
11/07/18 1046 113/40 98.4 °F (36.9 °C) 77 16 100 %   
 
11/08 0701 - 11/08 1900 In: 1083.8 [I.V.:1083.8] Out: -  
11/06 1901 - 11/08 0700 In: 595 [I.V.:595] Out: 400 [Urine:400] Physical Exam:  
Visit Vitals BP (!) 118/99 (BP 1 Location: Right arm, BP Patient Position: Sitting) Pulse 83 Temp 98 °F (36.7 °C) Resp 16 Ht 5' 3\" (1.6 m) Wt 169 lb 5 oz (76.8 kg) SpO2 99% BMI 29.99 kg/m² General appearance: alert, cooperative, no distress, appears stated age Neck: supple, symmetrical, trachea midline, no adenopathy, thyroid: not enlarged, symmetric, no tenderness/mass/nodules, no carotid bruit and no JVD Lungs: clear to auscultation bilaterally Heart: regular rate and rhythm, S1, S2 normal, no murmur, click, rub or gallop Abdomen: soft, non-tender. Bowel sounds normal. No masses,  no organomegaly Extremities: extremities normal, atraumatic, no cyanosis or edema Data Review Recent Results (from the past 24 hour(s)) GLUCOSE, POC Collection Time: 11/07/18 11:37 AM  
Result Value Ref Range Glucose (POC) 395 (H) 65 - 100 mg/dL Performed by Duane Platter, POC Collection Time: 11/07/18  4:24 PM  
Result Value Ref Range Glucose (POC) 125 (H) 65 - 100 mg/dL Performed by Duane Platter, POC Collection Time: 11/07/18  8:45 PM  
Result Value Ref Range Glucose (POC) 166 (H) 65 - 100 mg/dL Performed by Pantera Reyes (PCT) METABOLIC PANEL, BASIC Collection Time: 11/08/18  2:14 AM  
Result Value Ref Range Sodium 136 136 - 145 mmol/L Potassium 3.3 (L) 3.5 - 5.1 mmol/L Chloride 103 97 - 108 mmol/L  
 CO2 23 21 - 32 mmol/L Anion gap 10 5 - 15 mmol/L Glucose 51 (L) 65 - 100 mg/dL BUN 28 (H) 6 - 20 MG/DL Creatinine 0.83 0.55 - 1.02 MG/DL  
 BUN/Creatinine ratio 34 (H) 12 - 20 GFR est AA >60 >60 ml/min/1.73m2 GFR est non-AA >60 >60 ml/min/1.73m2 Calcium 8.4 (L) 8.5 - 10.1 MG/DL  
GLUCOSE, POC Collection Time: 11/08/18  7:13 AM  
Result Value Ref Range Glucose (POC) 50 (L) 65 - 100 mg/dL Performed by Liz Tyson GLUCOSE, POC Collection Time: 11/08/18  7:43 AM  
Result Value Ref Range Glucose (POC) 126 (H) 65 - 100 mg/dL Performed by Yennifer Ventura GLUCOSE, POC Collection Time: 11/08/18  8:03 AM  
Result Value Ref Range Glucose (POC) 145 (H) 65 - 100 mg/dL Performed by Kip Dasilva Assessment:  
 
Active Problems: 
  Dyslipidemia (2/6/2015) Hypertension (2/6/2015) Hypothyroidism (2/6/2015) DKA (diabetic ketoacidoses) ([facility] [address] (6/2/2017) Vascular dementia without behavioral disturbance (10/20/2018) H/O: CVA (cerebrovascular accident) (11/6/2018) Plan: 1. Continue with diabetic control. Will give correctional insulin dose before each meal and reduce basal insulin to limit nocturnal hypoglycemia. Morning 2. Hydration adequate.

## 2018-11-08 NOTE — PROGRESS NOTES
Spiritual Care Partner Volunteer visited patient in Progressive Care Unit on 11/8/2018. Documented by:  
Rev. Yee Montoya, [facility]  paging service: [phone])

## 2018-11-09 LAB
GLUCOSE BLD STRIP.AUTO-MCNC: 100 MG/DL (ref 65–100)
GLUCOSE BLD STRIP.AUTO-MCNC: 118 MG/DL (ref 65–100)
GLUCOSE BLD STRIP.AUTO-MCNC: 144 MG/DL (ref 65–100)
GLUCOSE BLD STRIP.AUTO-MCNC: 189 MG/DL (ref 65–100)
SERVICE CMNT-IMP: ABNORMAL
SERVICE CMNT-IMP: NORMAL

## 2018-11-09 PROCEDURE — 82962 GLUCOSE BLOOD TEST: CPT

## 2018-11-09 PROCEDURE — 3331090002 HH PPS REVENUE DEBIT

## 2018-11-09 PROCEDURE — 74011250636 HC RX REV CODE- 250/636: Performed by: INTERNAL MEDICINE

## 2018-11-09 PROCEDURE — 74011250637 HC RX REV CODE- 250/637: Performed by: INTERNAL MEDICINE

## 2018-11-09 PROCEDURE — 99218 HC RM OBSERVATION: CPT

## 2018-11-09 PROCEDURE — 74011636637 HC RX REV CODE- 636/637: Performed by: INTERNAL MEDICINE

## 2018-11-09 PROCEDURE — 3331090001 HH PPS REVENUE CREDIT

## 2018-11-09 RX ADMIN — ENOXAPARIN SODIUM 40 MG: 40 INJECTION, SOLUTION INTRAVENOUS; SUBCUTANEOUS at 21:30

## 2018-11-09 RX ADMIN — PRAVASTATIN SODIUM 80 MG: 40 TABLET ORAL at 08:20

## 2018-11-09 RX ADMIN — LEVOTHYROXINE SODIUM 150 MCG: 150 TABLET ORAL at 05:33

## 2018-11-09 RX ADMIN — BRIMONIDINE TARTRATE 1 DROP: 2 SOLUTION/ DROPS OPHTHALMIC at 21:30

## 2018-11-09 RX ADMIN — FENOFIBRATE 145 MG: 145 TABLET ORAL at 08:20

## 2018-11-09 RX ADMIN — CLOPIDOGREL BISULFATE 75 MG: 75 TABLET ORAL at 08:20

## 2018-11-09 RX ADMIN — AMLODIPINE BESYLATE 10 MG: 5 TABLET ORAL at 08:20

## 2018-11-09 RX ADMIN — INSULIN LISPRO 2 UNITS: 100 INJECTION, SOLUTION INTRAVENOUS; SUBCUTANEOUS at 08:20

## 2018-11-09 RX ADMIN — INSULIN LISPRO 2 UNITS: 100 INJECTION, SOLUTION INTRAVENOUS; SUBCUTANEOUS at 17:21

## 2018-11-09 RX ADMIN — BRIMONIDINE TARTRATE 1 DROP: 2 SOLUTION/ DROPS OPHTHALMIC at 08:21

## 2018-11-09 RX ADMIN — INSULIN LISPRO 2 UNITS: 100 INJECTION, SOLUTION INTRAVENOUS; SUBCUTANEOUS at 11:30

## 2018-11-09 RX ADMIN — INSULIN GLARGINE 20 UNITS: 100 INJECTION, SOLUTION SUBCUTANEOUS at 08:20

## 2018-11-09 NOTE — PROGRESS NOTES
Bedside and Verbal shift change report given to German Rivera RN (oncoming nurse) by Camilo Barrow RN (offgoing nurse). Report included the following information SBAR, Kardex, MAR and Recent Results.

## 2018-11-09 NOTE — DIABETES MGMT
[facility] Elevated A1C Visit Note Recommendations/ Comments: Please consider further reduction of lantus for discharge as pt reports continued overnight hypoglycemia at home. Current DM medications: lantus 20 units daily and humalog 2 units ac tid Met with pt for elevated a1c and admission for DKA. Pt denies missing insulin doses. States that her home health nurse told her to come to the ED because her BG was in the 400s. She reports that her BG is often high, but then also reports frequent hypoglycemia. She reports Dr. Erich Randall decreased her tresiba, however, she has continued to have overnight hypoglycemia despite eating PB and crackers at bedtime. She often requires assistance from her son and EMS to treat her hypoglycemia. She reports BG as low as 20s. Strongly enc her to discuss the above with Dr. Erich Randall as she may need further insulin adjustment. Reviewed treatment of hypoglycemia. Discussed a1c and BG goals. She reports that Dr. Erich Randall prescribed novolog for her approx a week ago, however, she states that she has not yet started taking it because it was not available from the pharmacy. Discussed actions and timing of insulin, need for basal insulin daily even in times of illness or not eating and discussed meal time novolog only if eating. Demonstrated insulin pen use for pt. She has not been doing an airshot or waiting 5 seconds to withdrawn the needle from the skin. Reviewed basic meal planning with pt using plate method. Enc portion control and protein at each meal. 
 
Patient is a 69 y.o. female with known Type 2 Diabetes on tresiba 20 units daily at home. A1c:  
Lab Results Component Value Date/Time Hemoglobin A1c 10.1 (H) 11/06/2018 05:22 PM  
 Hemoglobin A1c 10.3 (H) 10/20/2018 02:46 AM  
 
 
Recent Glucose Results:  
Lab Results Component Value Date/Time  GLUCPOC 189 (H) 11/09/2018 11:24 AM  
 GLUCPOC 100 11/09/2018 07:40 AM  
 GLUCPOC 164 (H) 11/08/2018 09:23 PM  
  
 
Lab Results Component Value Date/Time Creatinine 0.83 11/08/2018 02:14 AM  
 
 
Active Orders Diet DIET DIABETIC CONSISTENT CARB Regular; 2 GM NA (House Low NA) Assessed and instructed patient on the following:  
·  interpretation of lab results, blood sugar goals, hypoglycemia prevention and treatment, nutrition, referred to Diabetes Educator, site rotation and use of insulin pen Provided patient with the following: [x]          Diabetes Self-Care Guide 
             [x]          Insulin education materials 
             []          Diabetes survival skills handout []          BG guidelines for post-op patients []          \"Decreasing  the Cost of Diabetes Care\"               [x]          Outpatient DTC contact number Patient to follow up with PCP after discharge. Will continue to follow as needed. Thank you. Chanda Gan, BSN, RN, CDE [facility]

## 2018-11-09 NOTE — PROGRESS NOTES
Patient pulled out IV. Will discuss with day shift about possible discharge and contacting MD regarding IV. 2nd IV pulled out during the night. 
 
-4 attempts made to get new IV access. All attempts were unsuccessful.

## 2018-11-09 NOTE — PROGRESS NOTES
Patient transferred to [id number] and handoff given to LAKISHA Roberto CM to follow for dcp. Mark Bunn

## 2018-11-10 LAB
GLUCOSE BLD STRIP.AUTO-MCNC: 138 MG/DL (ref 65–100)
GLUCOSE BLD STRIP.AUTO-MCNC: 200 MG/DL (ref 65–100)
GLUCOSE BLD STRIP.AUTO-MCNC: 317 MG/DL (ref 65–100)
GLUCOSE BLD STRIP.AUTO-MCNC: 33 MG/DL (ref 65–100)
GLUCOSE BLD STRIP.AUTO-MCNC: 37 MG/DL (ref 65–100)
GLUCOSE BLD STRIP.AUTO-MCNC: 81 MG/DL (ref 65–100)
SERVICE CMNT-IMP: ABNORMAL
SERVICE CMNT-IMP: NORMAL

## 2018-11-10 PROCEDURE — 82962 GLUCOSE BLOOD TEST: CPT

## 2018-11-10 PROCEDURE — 74011250636 HC RX REV CODE- 250/636: Performed by: INTERNAL MEDICINE

## 2018-11-10 PROCEDURE — 65270000029 HC RM PRIVATE

## 2018-11-10 PROCEDURE — 99218 HC RM OBSERVATION: CPT

## 2018-11-10 PROCEDURE — 3331090002 HH PPS REVENUE DEBIT

## 2018-11-10 PROCEDURE — 3331090001 HH PPS REVENUE CREDIT

## 2018-11-10 PROCEDURE — 74011636637 HC RX REV CODE- 636/637: Performed by: INTERNAL MEDICINE

## 2018-11-10 PROCEDURE — 74011250636 HC RX REV CODE- 250/636

## 2018-11-10 PROCEDURE — 74011250637 HC RX REV CODE- 250/637: Performed by: INTERNAL MEDICINE

## 2018-11-10 RX ORDER — INSULIN GLARGINE 100 [IU]/ML
16 INJECTION, SOLUTION SUBCUTANEOUS DAILY
Status: DISCONTINUED | OUTPATIENT
Start: 2018-11-10 | End: 2018-11-12 | Stop reason: HOSPADM

## 2018-11-10 RX ADMIN — Medication 10 ML: at 21:45

## 2018-11-10 RX ADMIN — Medication 10 ML: at 05:34

## 2018-11-10 RX ADMIN — FENOFIBRATE 145 MG: 145 TABLET ORAL at 08:50

## 2018-11-10 RX ADMIN — INSULIN GLARGINE 16 UNITS: 100 INJECTION, SOLUTION SUBCUTANEOUS at 09:43

## 2018-11-10 RX ADMIN — AMLODIPINE BESYLATE 10 MG: 5 TABLET ORAL at 09:42

## 2018-11-10 RX ADMIN — PRAVASTATIN SODIUM 80 MG: 40 TABLET ORAL at 08:49

## 2018-11-10 RX ADMIN — LEVOTHYROXINE SODIUM 150 MCG: 150 TABLET ORAL at 06:18

## 2018-11-10 RX ADMIN — INSULIN LISPRO 2 UNITS: 100 INJECTION, SOLUTION INTRAVENOUS; SUBCUTANEOUS at 17:11

## 2018-11-10 RX ADMIN — ENOXAPARIN SODIUM 40 MG: 40 INJECTION, SOLUTION INTRAVENOUS; SUBCUTANEOUS at 21:44

## 2018-11-10 RX ADMIN — INSULIN LISPRO 2 UNITS: 100 INJECTION, SOLUTION INTRAVENOUS; SUBCUTANEOUS at 11:37

## 2018-11-10 RX ADMIN — BRIMONIDINE TARTRATE 1 DROP: 2 SOLUTION/ DROPS OPHTHALMIC at 08:50

## 2018-11-10 RX ADMIN — CLOPIDOGREL BISULFATE 75 MG: 75 TABLET ORAL at 08:50

## 2018-11-10 RX ADMIN — BRIMONIDINE TARTRATE 1 DROP: 2 SOLUTION/ DROPS OPHTHALMIC at 21:45

## 2018-11-10 RX ADMIN — Medication 10 ML: at 17:11

## 2018-11-10 RX ADMIN — GLUCAGON HYDROCHLORIDE 1 MG: 1 INJECTION, POWDER, FOR SOLUTION INTRAMUSCULAR; INTRAVENOUS; SUBCUTANEOUS at 07:40

## 2018-11-10 NOTE — PROGRESS NOTES
General Daily Progress Note Admit Date: 11/6/2018 Subjective:  
 
Patient lethargic and confused. Current Facility-Administered Medications Medication Dose Route Frequency  insulin glargine (LANTUS) injection 16 Units  16 Units SubCUTAneous DAILY  insulin lispro (HUMALOG) injection 2 Units  2 Units SubCUTAneous TIDAC  
 brimonidine (ALPHAGAN) 0.2 % ophthalmic solution 1 Drop  1 Drop Both Eyes BID  clopidogrel (PLAVIX) tablet 75 mg  75 mg Oral DAILY  fenofibrate nanocrystallized (TRICOR) tablet 145 mg  145 mg Oral DAILY  levothyroxine (SYNTHROID) tablet 150 mcg  150 mcg Oral 6am  
 amLODIPine (NORVASC) tablet 10 mg  10 mg Oral DAILY  pravastatin (PRAVACHOL) tablet 80 mg  80 mg Oral DAILY  sodium chloride (NS) flush 5-10 mL  5-10 mL IntraVENous Q8H  
 sodium chloride (NS) flush 5-10 mL  5-10 mL IntraVENous PRN  
 acetaminophen (TYLENOL) tablet 650 mg  650 mg Oral Q6H PRN  
 ondansetron (ZOFRAN) injection 4 mg  4 mg IntraVENous Q4H PRN  
 enoxaparin (LOVENOX) injection 40 mg  40 mg SubCUTAneous Q24H  
 nitroglycerin (NITROBID) 2 % ointment 1 Inch  1 Inch Topical Q6H PRN Review of Systems Review of systems not obtained due to patient factors. Objective:  
 
Patient Vitals for the past 24 hrs: 
 BP Temp Pulse Resp SpO2  
11/10/18 1522 152/63 98.2 °F (36.8 °C) 71 18 100 % 11/10/18 0904 141/69 97.1 °F (36.2 °C) 76 20 99 % 11/10/18 0745 170/84 98.6 °F (37 °C) 88 22 99 % 11/09/18 2301 134/57 97.4 °F (36.3 °C) 72 19 100 % 11/09/18 1550 133/70 98.2 °F (36.8 °C) 73 20 100 % No intake/output data recorded. 11/08 1901 - 11/10 0700 In: -  
Out: 150 [Urine:150] Physical Exam:  
Visit Vitals /63 (BP 1 Location: Left arm, BP Patient Position: Sitting) Pulse 71 Temp 98.2 °F (36.8 °C) Resp 18 Ht 5' 3\" (1.6 m) Wt 169 lb 5 oz (76.8 kg) SpO2 100% BMI 29.99 kg/m² General appearance: no distress, appears stated age, confused, lethargic Neck: supple, symmetrical, trachea midline, no adenopathy, thyroid: not enlarged, symmetric, no tenderness/mass/nodules, no carotid bruit and no JVD Lungs: clear to auscultation bilaterally Heart: regular rate and rhythm, S1, S2 normal, no murmur, click, rub or gallop Abdomen: soft, non-tender. Bowel sounds normal. No masses,  no organomegaly Extremities: extremities normal, atraumatic, no cyanosis or edema Data Review Recent Results (from the past 24 hour(s)) GLUCOSE, POC Collection Time: 11/09/18  4:17 PM  
Result Value Ref Range Glucose (POC) 118 (H) 65 - 100 mg/dL Performed by Tita Challenger GLUCOSE, POC Collection Time: 11/09/18  9:05 PM  
Result Value Ref Range Glucose (POC) 144 (H) 65 - 100 mg/dL Performed by Clay Han (PCT) GLUCOSE, POC Collection Time: 11/10/18  7:34 AM  
Result Value Ref Range Glucose (POC) 33 (LL) 65 - 100 mg/dL Performed by Polly Goel GLUCOSE, POC Collection Time: 11/10/18  7:36 AM  
Result Value Ref Range Glucose (POC) 37 (LL) 65 - 100 mg/dL Performed by Polly Goel GLUCOSE, POC Collection Time: 11/10/18  7:56 AM  
Result Value Ref Range Glucose (POC) 81 65 - 100 mg/dL Performed by Polly Goel GLUCOSE, POC Collection Time: 11/10/18 11:29 AM  
Result Value Ref Range Glucose (POC) 200 (H) 65 - 100 mg/dL Performed by Polly Goel Assessment:  
 
Active Problems: 
  Dyslipidemia (2/6/2015) Hypertension (2/6/2015) Hypothyroidism (2/6/2015) DKA (diabetic ketoacidoses) ([facility] [address] (6/2/2017) Vascular dementia without behavioral disturbance (10/20/2018) H/O: CVA (cerebrovascular accident) (11/6/2018) Plan: 1. Hypoglycemia nocturnally actively being treated. We will continue to reduce basal insulin until this resolves. Continue prandial insulin. 2.  At bedtime snack has to be given with the caloric amount advised.

## 2018-11-10 NOTE — PROGRESS NOTES
General Daily Progress Note Admit Date: 11/6/2018 Subjective:  
 
Patient has no complaint. Current Facility-Administered Medications Medication Dose Route Frequency  insulin glargine (LANTUS) injection 20 Units  20 Units SubCUTAneous DAILY  insulin lispro (HUMALOG) injection 2 Units  2 Units SubCUTAneous TIDAC  
 brimonidine (ALPHAGAN) 0.2 % ophthalmic solution 1 Drop  1 Drop Both Eyes BID  clopidogrel (PLAVIX) tablet 75 mg  75 mg Oral DAILY  fenofibrate nanocrystallized (TRICOR) tablet 145 mg  145 mg Oral DAILY  levothyroxine (SYNTHROID) tablet 150 mcg  150 mcg Oral 6am  
 amLODIPine (NORVASC) tablet 10 mg  10 mg Oral DAILY  pravastatin (PRAVACHOL) tablet 80 mg  80 mg Oral DAILY  sodium chloride (NS) flush 5-10 mL  5-10 mL IntraVENous Q8H  
 sodium chloride (NS) flush 5-10 mL  5-10 mL IntraVENous PRN  
 acetaminophen (TYLENOL) tablet 650 mg  650 mg Oral Q6H PRN  
 ondansetron (ZOFRAN) injection 4 mg  4 mg IntraVENous Q4H PRN  
 enoxaparin (LOVENOX) injection 40 mg  40 mg SubCUTAneous Q24H  
 nitroglycerin (NITROBID) 2 % ointment 1 Inch  1 Inch Topical Q6H PRN Review of Systems A comprehensive review of systems was negative. Objective:  
 
Patient Vitals for the past 24 hrs: 
 BP Temp Pulse Resp SpO2  
11/09/18 1550 133/70 98.2 °F (36.8 °C) 73 20 100 % 11/09/18 1128 149/74 97.9 °F (36.6 °C) 73 19 99 % 11/09/18 0759 155/75 97.9 °F (36.6 °C) 75 20 99 % 11/08/18 2236 138/60 97.9 °F (36.6 °C) 76 18 99 % No intake/output data recorded. 11/08 0701 - 11/09 1900 In: 1496.7 [I.V.:1496.7] Out: 150 [Urine:150] Physical Exam:  
Visit Vitals /70 (BP 1 Location: Right arm, BP Patient Position: At rest) Pulse 73 Temp 98.2 °F (36.8 °C) Resp 20 Ht 5' 3\" (1.6 m) Wt 169 lb 5 oz (76.8 kg) SpO2 100% BMI 29.99 kg/m² General appearance: alert, cooperative, no distress, appears stated age Neck: supple, symmetrical, trachea midline, no adenopathy, thyroid: not enlarged, symmetric, no tenderness/mass/nodules, no carotid bruit and no JVD Lungs: clear to auscultation bilaterally Heart: regular rate and rhythm, S1, S2 normal, no murmur, click, rub or gallop Abdomen: soft, non-tender. Bowel sounds normal. No masses,  no organomegaly Extremities: extremities normal, atraumatic, no cyanosis or edema Neurologic: Grossly normal 
   
 
Data Review Recent Results (from the past 24 hour(s)) GLUCOSE, POC Collection Time: 11/09/18  7:40 AM  
Result Value Ref Range Glucose (POC) 100 65 - 100 mg/dL Performed by Julian Lion (PCT) GLUCOSE, POC Collection Time: 11/09/18 11:24 AM  
Result Value Ref Range Glucose (POC) 189 (H) 65 - 100 mg/dL Performed by Chaz Khanna (PCT) GLUCOSE, POC Collection Time: 11/09/18  4:17 PM  
Result Value Ref Range Glucose (POC) 118 (H) 65 - 100 mg/dL Performed by Randy Guzman GLUCOSE, POC Collection Time: 11/09/18  9:05 PM  
Result Value Ref Range Glucose (POC) 144 (H) 65 - 100 mg/dL Performed by Heydi Pang (PCT) Assessment:  
 
Active Problems: 
  Dyslipidemia (2/6/2015) Hypertension (2/6/2015) Hypothyroidism (2/6/2015) DKA (diabetic ketoacidoses) ([facility] [address] (6/2/2017) Vascular dementia without behavioral disturbance (10/20/2018) H/O: CVA (cerebrovascular accident) (11/6/2018) Plan: 1. Blood sugars are reasonable. Current trend continues discharge tomorrow.

## 2018-11-10 NOTE — PROGRESS NOTES
BG 33, 37; pt incoherent, arousable to voice, confused; no IV access, pt given Glucagon 1 mg IM; repeat BG 81 @ 0756; Dr. Rhiannon Pompa was at bedside @ 0730 and is aware of hypoglycemic event, insulin orders to be adjusted per Dr. Bart Copeland, verbal orders received to HOLD Humalog 2 units this morning, ensure patient eats bedtime snack. 0800: Pt awake, alert and oriented x 3.

## 2018-11-10 NOTE — PROGRESS NOTES
Problem: Falls - Risk of 
Goal: *Absence of Falls Document Malou Harris Fall Risk and appropriate interventions in the flowsheet. Outcome: Progressing Towards Goal 
Fall Risk Interventions: 
  
 
  
 
Medication Interventions: Patient to call before getting OOB Elimination Interventions: Call light in reach

## 2018-11-10 NOTE — PROGRESS NOTES
Bedside and Verbal shift change report given to [id number] [employer] [place] (oncoming nurse) by Angel Boyce RN (offgoing nurse). Report included the following information SBAR, Kardex, Intake/Output, MAR and Recent Results.

## 2018-11-10 NOTE — PROGRESS NOTES
Bedside and Verbal shift change report given to Nitin Caal (oncoming nurse) by Catrachita Flower (offgoing nurse). Report included the following information SBAR, Kardex, Intake/Output, MAR, Accordion and Med Rec Status.

## 2018-11-11 LAB
GLUCOSE BLD STRIP.AUTO-MCNC: 137 MG/DL (ref 65–100)
GLUCOSE BLD STRIP.AUTO-MCNC: 171 MG/DL (ref 65–100)
GLUCOSE BLD STRIP.AUTO-MCNC: 175 MG/DL (ref 65–100)
GLUCOSE BLD STRIP.AUTO-MCNC: 247 MG/DL (ref 65–100)
GLUCOSE BLD STRIP.AUTO-MCNC: 80 MG/DL (ref 65–100)
SERVICE CMNT-IMP: ABNORMAL
SERVICE CMNT-IMP: NORMAL

## 2018-11-11 PROCEDURE — 74011636637 HC RX REV CODE- 636/637: Performed by: INTERNAL MEDICINE

## 2018-11-11 PROCEDURE — 82962 GLUCOSE BLOOD TEST: CPT

## 2018-11-11 PROCEDURE — 3331090002 HH PPS REVENUE DEBIT

## 2018-11-11 PROCEDURE — 74011250636 HC RX REV CODE- 250/636: Performed by: INTERNAL MEDICINE

## 2018-11-11 PROCEDURE — 74011250637 HC RX REV CODE- 250/637: Performed by: INTERNAL MEDICINE

## 2018-11-11 PROCEDURE — 3331090001 HH PPS REVENUE CREDIT

## 2018-11-11 PROCEDURE — 65270000029 HC RM PRIVATE

## 2018-11-11 RX ADMIN — Medication 10 ML: at 15:00

## 2018-11-11 RX ADMIN — Medication 10 ML: at 05:05

## 2018-11-11 RX ADMIN — INSULIN LISPRO 2 UNITS: 100 INJECTION, SOLUTION INTRAVENOUS; SUBCUTANEOUS at 18:06

## 2018-11-11 RX ADMIN — BRIMONIDINE TARTRATE 1 DROP: 2 SOLUTION/ DROPS OPHTHALMIC at 09:24

## 2018-11-11 RX ADMIN — INSULIN LISPRO 2 UNITS: 100 INJECTION, SOLUTION INTRAVENOUS; SUBCUTANEOUS at 12:46

## 2018-11-11 RX ADMIN — FENOFIBRATE 145 MG: 145 TABLET ORAL at 09:25

## 2018-11-11 RX ADMIN — INSULIN LISPRO 2 UNITS: 100 INJECTION, SOLUTION INTRAVENOUS; SUBCUTANEOUS at 09:25

## 2018-11-11 RX ADMIN — CLOPIDOGREL BISULFATE 75 MG: 75 TABLET ORAL at 09:24

## 2018-11-11 RX ADMIN — LEVOTHYROXINE SODIUM 150 MCG: 150 TABLET ORAL at 05:04

## 2018-11-11 RX ADMIN — PRAVASTATIN SODIUM 80 MG: 40 TABLET ORAL at 09:24

## 2018-11-11 RX ADMIN — INSULIN GLARGINE 16 UNITS: 100 INJECTION, SOLUTION SUBCUTANEOUS at 09:25

## 2018-11-11 RX ADMIN — ENOXAPARIN SODIUM 40 MG: 40 INJECTION, SOLUTION INTRAVENOUS; SUBCUTANEOUS at 20:17

## 2018-11-11 RX ADMIN — BRIMONIDINE TARTRATE 1 DROP: 2 SOLUTION/ DROPS OPHTHALMIC at 20:16

## 2018-11-11 RX ADMIN — Medication 10 ML: at 21:50

## 2018-11-11 RX ADMIN — AMLODIPINE BESYLATE 10 MG: 5 TABLET ORAL at 09:25

## 2018-11-11 NOTE — PROGRESS NOTES
General Daily Progress Note Admit Date: 11/6/2018 Subjective:  
 
Patient has no complaint. Current Facility-Administered Medications Medication Dose Route Frequency  insulin glargine (LANTUS) injection 16 Units  16 Units SubCUTAneous DAILY  insulin lispro (HUMALOG) injection 2 Units  2 Units SubCUTAneous TIDAC  
 brimonidine (ALPHAGAN) 0.2 % ophthalmic solution 1 Drop  1 Drop Both Eyes BID  clopidogrel (PLAVIX) tablet 75 mg  75 mg Oral DAILY  fenofibrate nanocrystallized (TRICOR) tablet 145 mg  145 mg Oral DAILY  levothyroxine (SYNTHROID) tablet 150 mcg  150 mcg Oral 6am  
 amLODIPine (NORVASC) tablet 10 mg  10 mg Oral DAILY  pravastatin (PRAVACHOL) tablet 80 mg  80 mg Oral DAILY  sodium chloride (NS) flush 5-10 mL  5-10 mL IntraVENous Q8H  
 sodium chloride (NS) flush 5-10 mL  5-10 mL IntraVENous PRN  
 acetaminophen (TYLENOL) tablet 650 mg  650 mg Oral Q6H PRN  
 ondansetron (ZOFRAN) injection 4 mg  4 mg IntraVENous Q4H PRN  
 enoxaparin (LOVENOX) injection 40 mg  40 mg SubCUTAneous Q24H  
 nitroglycerin (NITROBID) 2 % ointment 1 Inch  1 Inch Topical Q6H PRN Review of Systems A comprehensive review of systems was negative. Objective:  
 
Patient Vitals for the past 24 hrs: 
 BP Temp Pulse Resp SpO2  
11/11/18 0819 152/73 98.4 °F (36.9 °C) 72 18 100 % 11/11/18 0022 136/52 98 °F (36.7 °C) 77 18 99 % 11/10/18 1522 152/63 98.2 °F (36.8 °C) 71 18 100 % No intake/output data recorded. 11/09 1901 - 11/11 0700 In: 480 [P.O.:480] Out: - Physical Exam:  
Visit Vitals /73 (BP 1 Location: Left arm, BP Patient Position: At rest) Pulse 72 Temp 98.4 °F (36.9 °C) Resp 18 Ht 5' 3\" (1.6 m) Wt 169 lb 5 oz (76.8 kg) SpO2 100% BMI 29.99 kg/m² General appearance: alert, cooperative, no distress, appears stated age Neck: supple, symmetrical, trachea midline, no adenopathy, thyroid: not enlarged, symmetric, no tenderness/mass/nodules, no carotid bruit and no JVD Lungs: clear to auscultation bilaterally Heart: regular rate and rhythm, S1, S2 normal, no murmur, click, rub or gallop Abdomen: soft, non-tender. Bowel sounds normal. No masses,  no organomegaly Extremities: extremities normal, atraumatic, no cyanosis or edema Data Review Recent Results (from the past 24 hour(s)) GLUCOSE, POC Collection Time: 11/10/18 11:29 AM  
Result Value Ref Range Glucose (POC) 200 (H) 65 - 100 mg/dL Performed by Vel Oneal GLUCOSE, POC Collection Time: 11/10/18  4:13 PM  
Result Value Ref Range Glucose (POC) 138 (H) 65 - 100 mg/dL Performed by Harrison Hemphill GLUCOSE, POC Collection Time: 11/10/18  9:08 PM  
Result Value Ref Range Glucose (POC) 317 (H) 65 - 100 mg/dL Performed by [place] Chain (PCT) GLUCOSE, POC Collection Time: 11/11/18  7:45 AM  
Result Value Ref Range Glucose (POC) 171 (H) 65 - 100 mg/dL Performed by [place] Chain (PCT) GLUCOSE, POC Collection Time: 11/11/18  8:23 AM  
Result Value Ref Range Glucose (POC) 137 (H) 65 - 100 mg/dL Performed by Maureen Alvarado Assessment:  
 
Active Problems: 
  Dyslipidemia (2/6/2015) Hypertension (2/6/2015) Hypothyroidism (2/6/2015) [facility]oses[facility] (6/2/2017) Vascular dementia without behavioral disturbance (10/20/2018) H/O: CVA (cerebrovascular accident) (11/6/2018) Plan: 1. Blood sugars in the evening were elevated because of excessive processed carbohydrate consumed at dinnertime. As result of the hyperglycemia there was no nocturnal reduction noted. My concern is over basalization. Blood sugars are reasonable with no nocturnal hypoglycemia discharge tomorrow. 2.  The patient also has hypoglycemic unawareness which I previously noted. 3.  Blood pressure is acceptable. 4.  Primary comorbidity currently is a reduction in her short-term memory.

## 2018-11-11 NOTE — PROGRESS NOTES
Chart reviewed. Please order PT consult to assess mobility and Palliative Care to discuss goals, plan of care, MPOA, readmissions. Pt was here in October. Pt is open to [facility].

## 2018-11-11 NOTE — PROGRESS NOTES
Bedside and Verbal shift change report given to Merlyn Arnold RN (oncoming nurse) by Edson Puente RN (offgoing nurse). Report included the following information SBAR, Kardex, Intake/Output, MAR and Recent Results.

## 2018-11-11 NOTE — PROGRESS NOTES
Bedside and Verbal shift change report given to Kg ricks RN (oncoming nurse) by Beck Coates RN (offgoing nurse). Report included the following information SBAR, Kardex, MAR and Recent Results.

## 2018-11-12 VITALS
DIASTOLIC BLOOD PRESSURE: 64 MMHG | HEIGHT: 63 IN | WEIGHT: 169.31 LBS | BODY MASS INDEX: 30 KG/M2 | RESPIRATION RATE: 16 BRPM | HEART RATE: 62 BPM | OXYGEN SATURATION: 100 % | SYSTOLIC BLOOD PRESSURE: 139 MMHG | TEMPERATURE: 98.5 F

## 2018-11-12 LAB
GLUCOSE BLD STRIP.AUTO-MCNC: 105 MG/DL (ref 65–100)
GLUCOSE BLD STRIP.AUTO-MCNC: 216 MG/DL (ref 65–100)
SERVICE CMNT-IMP: ABNORMAL
SERVICE CMNT-IMP: ABNORMAL

## 2018-11-12 PROCEDURE — 74011250637 HC RX REV CODE- 250/637: Performed by: INTERNAL MEDICINE

## 2018-11-12 PROCEDURE — 3331090002 HH PPS REVENUE DEBIT

## 2018-11-12 PROCEDURE — 74011636637 HC RX REV CODE- 636/637: Performed by: INTERNAL MEDICINE

## 2018-11-12 PROCEDURE — 82962 GLUCOSE BLOOD TEST: CPT

## 2018-11-12 PROCEDURE — 3331090001 HH PPS REVENUE CREDIT

## 2018-11-12 RX ORDER — INSULIN ASPART 100 [IU]/ML
INJECTION, SOLUTION INTRAVENOUS; SUBCUTANEOUS
Qty: 1 ADJUSTABLE DOSE PRE-FILLED PEN SYRINGE | Refills: 11 | Status: ON HOLD | OUTPATIENT
Start: 2018-11-12 | End: 2019-06-15 | Stop reason: SDUPTHER

## 2018-11-12 RX ORDER — INSULIN DEGLUDEC 100 U/ML
20 INJECTION, SOLUTION SUBCUTANEOUS DAILY
Qty: 1 ADJUSTABLE DOSE PRE-FILLED PEN SYRINGE | Refills: 11 | Status: SHIPPED | OUTPATIENT
Start: 2018-11-12 | End: 2019-06-10 | Stop reason: DRUGHIGH

## 2018-11-12 RX ADMIN — BRIMONIDINE TARTRATE 1 DROP: 2 SOLUTION/ DROPS OPHTHALMIC at 08:48

## 2018-11-12 RX ADMIN — LEVOTHYROXINE SODIUM 150 MCG: 150 TABLET ORAL at 06:11

## 2018-11-12 RX ADMIN — Medication 10 ML: at 06:11

## 2018-11-12 RX ADMIN — INSULIN LISPRO 2 UNITS: 100 INJECTION, SOLUTION INTRAVENOUS; SUBCUTANEOUS at 08:46

## 2018-11-12 RX ADMIN — AMLODIPINE BESYLATE 10 MG: 5 TABLET ORAL at 08:45

## 2018-11-12 RX ADMIN — PRAVASTATIN SODIUM 80 MG: 40 TABLET ORAL at 08:45

## 2018-11-12 RX ADMIN — FENOFIBRATE 145 MG: 145 TABLET ORAL at 08:45

## 2018-11-12 RX ADMIN — CLOPIDOGREL BISULFATE 75 MG: 75 TABLET ORAL at 08:45

## 2018-11-12 RX ADMIN — INSULIN GLARGINE 16 UNITS: 100 INJECTION, SOLUTION SUBCUTANEOUS at 09:00

## 2018-11-12 NOTE — PROGRESS NOTES
Previously Scheduled PCP ALEKSANDAR appt scheduled with  on 11/15/2018 at 4:45pm added to AVS. CELINE Calderon CM Specialist

## 2018-11-12 NOTE — PROGRESS NOTES
Pt. Blood glucose 264 at bedtime with no coverage. Will clarify with on call MD for Dr. Bipin Coburn 2106: Paged Dr. Bipin Coburn. No new orders received. Bedside shift change report given to VIN Alvarado (oncoming nurse) by Fabiola Borrero (offgoing nurse). Report included the following information SBAR, Kardex, Procedure Summary, Intake/Output, MAR and Recent Results.

## 2018-11-12 NOTE — PROGRESS NOTES
Bedside shift change report given to Scotty Floyd (oncoming nurse) by Zohra Tiwari (offgoing nurse). Report included the following information SBAR, Kardex, Intake/Output, MAR and Recent Results. Pt up in chair; anticipated DC tomorrow.

## 2018-11-12 NOTE — PROGRESS NOTES
All in agreement with d/c to home today with resumption of [facility] nursing. Family to transport.

## 2018-11-12 NOTE — DISCHARGE INSTRUCTIONS
General Discharge Instructions    Patient ID:  Denia Stewart  [id number]  69 y.o.  1940    Patient Instructions:1. All meals must be eaten at the same time every day----breakfast 8 AM, lunch at 12 noon, dinner 5 PM, bedtime snack 9 PM  2. Blood sugars need to be checked for all meals and at bedtime. 3.  Blood sugars need to be called to the office every 2 days for possible adjustments. 4.  Supervision is required for all steps!!!! The following personal items were collected during your admission and were returned to you. Take Home Medications           What to do at Home    Recommended diet: Diabetic Diet    Recommended activity: Activity as tolerated    Follow-up with Crystal Simon MD  in 3 days. Information obtained by :  I understand that if any problems occur once I am at home I am to contact my physician. I understand and acknowledge receipt of the instructions indicated above.                                                                                                                                            Physician's or R.N.'s Signature                                                                  Date/Time                                                                                                                                              Patient or Representative Signature                                                          Date/Time

## 2018-11-13 ENCOUNTER — PATIENT OUTREACH (OUTPATIENT)
Dept: INTERNAL MEDICINE CLINIC | Age: 78
End: 2018-11-13

## 2018-11-13 PROCEDURE — 3331090002 HH PPS REVENUE DEBIT

## 2018-11-13 PROCEDURE — 3331090001 HH PPS REVENUE CREDIT

## 2018-11-14 PROCEDURE — 3331090002 HH PPS REVENUE DEBIT

## 2018-11-14 PROCEDURE — 3331090001 HH PPS REVENUE CREDIT

## 2018-11-15 ENCOUNTER — OFFICE VISIT (OUTPATIENT)
Dept: INTERNAL MEDICINE CLINIC | Age: 78
End: 2018-11-15

## 2018-11-15 VITALS
SYSTOLIC BLOOD PRESSURE: 162 MMHG | HEART RATE: 76 BPM | RESPIRATION RATE: 16 BRPM | BODY MASS INDEX: 30.64 KG/M2 | DIASTOLIC BLOOD PRESSURE: 69 MMHG | HEIGHT: 63 IN | WEIGHT: 172.9 LBS | OXYGEN SATURATION: 95 % | TEMPERATURE: 98.4 F

## 2018-11-15 DIAGNOSIS — E78.5 DYSLIPIDEMIA: ICD-10-CM

## 2018-11-15 DIAGNOSIS — R41.3 MEMORY DEFICIT: ICD-10-CM

## 2018-11-15 DIAGNOSIS — I10 ESSENTIAL HYPERTENSION: ICD-10-CM

## 2018-11-15 DIAGNOSIS — E10.65 HYPERGLYCEMIA DUE TO TYPE 1 DIABETES MELLITUS (HCC): Primary | ICD-10-CM

## 2018-11-15 DIAGNOSIS — E03.2 HYPOTHYROIDISM DUE TO MEDICATION: ICD-10-CM

## 2018-11-15 PROCEDURE — 3331090002 HH PPS REVENUE DEBIT

## 2018-11-15 PROCEDURE — 3331090001 HH PPS REVENUE CREDIT

## 2018-11-15 NOTE — PROGRESS NOTES
[facility] -2018:  DKA w/o Comma   
Patient listed on CC D/C Report dated 2018 [facility] for Diabetic Ketoacidosis. LM on VM for daughter to return call to up appointment date if desired--an cancellation left an opening for earlier appointment. Hospital Discharge Follow-Up Date/Time:  11/15/2018 12:05 PM 
 
Patient was admitted to [facility] on  and discharged on 2018 for DKA. The physician discharge summary was available at the time of outreach. Patient was contacted within 2 business days of discharge. Top Challenges reviewed with the provider Advisement of Patient from [facility] staff:   
Patient Instructions:1. All meals must be eaten at the same time every day----breakfast 8 AM, lunch at 12 noon, dinner 5 PM, bedtime snack 9 PM 
2. Blood sugars need to be checked for all meals and at bedtime. 3.  Blood sugars need to be called to the office every 2 days for possible adjustments. 4.  Supervision is required for all steps!!!! 
(Your advisement is:  eating at the same time every day, which means every 4 hours for breakfast, lunch and dinner. Three to 4 hours later, she is to eat a bedtime snack and this has to be a minimum of 360 calories. It also has to include protein). ---NN discussed this with patient. BS this AM:  [facility] RADIOGRAPHS:  Chest x-ray revealed mild bibasilar atelectasis Method of communication with provider :  Face to Face Inpatient RRAT score:  23 Was this a readmission? yes -10/19/2018 - 10/23/2018 Patient stated reason for the readmission: patient stated she does not know. Nurse Navigator (NN) contacted the patient by telephone to perform post hospital discharge assessment. Verified name and  with patient as identifiers. Provided introduction to self, and explanation of the Nurse Navigator role.   
 
Reviewed discharge instructions and red flags with patient who verbalized understanding. Patient given an opportunity to ask questions and does not have any further questions or concerns at this time. The patient agrees to contact the PCP office for questions related to their healthcare. NN provided contact information for future reference. Disease Specific:   diabetes Summary of patient's top problems: 1. Needs assistance with BS checks/insulin administration:  NN left message for call back from [place] Nurse to discuss. 2. [facility]  [place] company: [id number] Date of initial visit: was 11/15/2018:  Patient states she told them not to come due to having to see PCP today. NN spoke with Cassi Escalante at [facility]--now scheduled to come to resume services on 2018. Durable Medical Equipment ordered/company: none Durable Medical Equipment received: n/a Barriers to care? Support System--needs assistance with ascertaining insulin taking. Advance Care Planning:  
Does patient have an Advance Directive:  not on file; education provided --daughter needs to be spoken to. Medication(s):  
New Medications at Discharge: none Changed Medications at Discharge: Novolo  AC Breakfast, Lunch, Dinner;  Erinn Lecher Flex 20u SQ q AM 
Discontinued Medications at Discharge: none Medication reconciliation was performed with patient, who verbalizes understanding of administration of home medications. There were no barriers to obtaining medications identified at this time. Referral to Pharm D needed: no  
 
Current Outpatient Medications Medication Sig  
 insulin aspart U-100 (NOVOLOG) 100 unit/mL inpn 2 units AC breakfast,lunch, and dinner  insulin degludec (TRESIBA FLEXTOUCH U-100) 100 unit/mL (3 mL) inpn 20 Units by SubCUTAneous route daily. 14 units every morning  pravastatin (PRAVACHOL) 80 mg tablet Take 1 Tab by mouth daily.   
 acetaminophen (TYLENOL) 500 mg tablet Take 500 mg by mouth every six (6) hours as needed for Pain.  levothyroxine (SYNTHROID) 150 mcg tablet TAKE 1 TABLET BY MOUTH EVERY DAY  amLODIPine (NORVASC) 10 mg tablet TAKE 1 TABLET BY MOUTH EVERY MORNING  
 clopidogrel (PLAVIX) 75 mg tab TAKE 1 TAB BY MOUTH DAILY.  brimonidine (ALPHAGAN) 0.15 % ophthalmic solution Administer 1 Drop to both eyes two (2) times a day.  fenofibrate nanocrystallized (TRICOR) 145 mg tablet Take 145 mg by mouth daily. No current facility-administered medications for this visit. There are no discontinued medications. [facility] follow up appointment(s):  
Future Appointments Date Time Provider Tawnya Santamariai 11/15/2018  4:45 PM Maria Guadalupe Bess MD [facility] follow up appointment(s): none [facility]:  n/a  
 
 
Goals  Attends follow-up appointments as directed. Pt will follow up as scheduled with Dr Carina Mcleod on 11/14. Patient refused to up office appointment until reviewing schedule for new appointments. 9/4/2018:  NN contacted patient as ALEKSANDAR d/c; today is following the holiday-patient stated she got her days mixed up as son was ready to bring her to the office appointment. NN rescheduled patient for 9/6/2018--patient unable to come tomorrow due to obligations. EW  
 
10/10/2018:  Patient states [facility] visit starts tomorrow, and states to prefer to leave [facility] appointment on Monday 10/15/2018 1:15PM due to son already planned to bring her in which she wishes to be with her. NN provided office # and NN direct line for patient to call should she have questions or needs during and after office hours. EW  
  
  Knowledge and adherence of medication (ie. action, side effects, missed dose, etc.) Patient states taking insulin as ordered. Daughter states insulin is being taken. EW  
 
10/31/2018:  Caretaker Daughter Giuliano Rivera states patient is having low BS levels during the night. Discussion on snack before bedtime.   States Brother makes sure patient is receiving. Will continue to f/u.  EW  
 
 
  
  Patient verbalizes understanding of self -management goals of living with Diabetes. Patient will verbalize understanding of self -management goals of living with Diabetes. Presently, patient states she doesn't know what to do with the new diabetic medications. PCP ordered patient to come in to office today to [address] [place] [address]. 9/4/2018:  Patient asked if PCP had changed her insulin; stated she thinks due to her not eating much, it needs to be changed. Patient was to come for [facility] today but states she got days mixed up but agreed to reschedule. Patient encouraged to decide foods she likes and try to eat the proper amounts from those rather than a full course meal she does not like. Patient agreed to make a list of likes for the son who grocery shops will bring those foods. Patient states she will ask PCP to decrease insulin. EW  
 
10/10/2018:  Patient Teach-back done on S&S of hyperglycemia & Hypo; patient discussed the causes resulting in hospital visit & what to do to avoid. Patient states she had been eating candy to prevent hypoglycemia; Review done on fast acting carbs to alleviate low BS. Patient agreed to check BS BID including before bed; stated PCP advised a night snack to prevent low BS drops during the night. NN mailed the Diabetes Hypoglycemia S&S Leaflet & Take Action Quickly if Low Blood Sugar leaflet by [employer]. EW   
 
10/24/2018: D/C Diet plan discussed with patient & son. Patient voiced taking Rita Power 24u today as ordered. Food Prep/diet/snacks discussed with son Yolanda Braden who prepared for breakfast, egg, potatoes, toast per patient. Also spoke with son to confirm PCP f/u visit. EW  
 
10/31/2018: NN spoke with patient after speaking with Caretaker daughter Sintia Kirby. Final diagnosis from hospital:  Includes Memory Deficits. Patient states she is still having low BS during the night.   States she didn't remember to check her BS but at a cheese sandwich. Patient teachings covered S&S of Low BS and interventions: keeping by the bed glucose tablets or cake frosting tube by the bed, or 1/2 glass of milk (4oz), 1/2 can real soda, etc. The wait 15 min & recheck. ... Advised patient on the importance of checking BS before bed as well as upon awaking during the night due to low BS. EW  
  
  Patient verbalizes understanding of self -management goals of living with Diabetes. 10/31/2018:  NN spoke with patient. Discussed the frequency of hypoglycemia episodes as reported by Rivera Ramires. Patient states she knows how to take her insulin; denies that it is anything except she is not eating enough. Needs support-Will consult w/ Daughter Enrique Ashraf and PCP. EW  
 
11/5/2018:  Patient denied furtherance of hypoglycemia episodes. Patient stated she is not eating enough and has never been a big eater. Family support:  Patient stated her son was asleep at the moment preventing NN from speaking with him to inquire if live-in son can assist with times of eating and times of insulin administration. Patient denied she needed any assistance. Strategy:  Patient agreed to place notes on refridgerator, bathroom mirror, etc as reminder to taking bedtime snack. Patient states caretaker simba Ashraf does not live there but comes everyday at [address] to assure she is consuming a balanced diet. Barrier:  Lack of understanding disease. Goal:  To continue teachings on insulin teach-back (amount), and amounts to administer. EW 
 
  
  Self-schedules and keeps appointments 10/31/2018:  Consult done w/ PCP re ALEKSANDAR; advised that patient needs to come to office for post d/c f/u visit. NN called; spoke with Caretaker simba Ashraf;  States she had to cancel on yesterday, the appointment for Nov 5th due to conflict of scheduling. Refused to reschedule today; agrees to call back with the updated schedule.   EW   
 
 11/1/2018:  NN spoke with Caretaker daughter [facility] [place] d/c visit today if possible. Consult done w/ PCP regarding appointment. Agreed to place on schedule as late as needed due to caretaker daughter must leave work. Scheduled for [email or web address].  E W 
  
  Takes Medications as prescribed 10/15/2018:  Goal:  Consult done w/ PCP; Patient's daughter [facility] agreed to  meds today post ALEKSANDAR visit. EW 
 
10/18/2018:  Antonio Mercado 10/3-10/9/2018:  Diabetic Ketoacidosis f/u:  NN received call from patient's daughter stating medication was at Pharmacy for pickup. Consult done w/ PCP for med. Inquiry and teachings done on reasons for DKA; daughter states she did not understand what patient could have eaten. Discussion regarding night snacks advised by PCP, however, patient was admitted to ED @ 1558. Discussion occurred re patient needing supervision when eating snacks and other meals as well. Discussion occurred on who does shopping, that no sugary foods nor beverages should be left around for patient to eat. Beverages should be replaced with water. Discussed patient's WBC 15.6  & A1c 10+. UTI teachings done. Will continue to f/u.  EW 
 
  
  Understands red flags post discharge. Pt has agreed to contact PCP with any sxs of hyper/hypoglycemia. Patient sound weak compared to [employer] call 2 days ago. Patient stated ambulance was there early this morning. PCP advised patient to come to office immediately today.

## 2018-11-15 NOTE — PROGRESS NOTES
Chief Complaint Patient presents with  Blood sugar problem Patient recently admitted to [facility] for DKA. 1. Have you been to the ER, urgent care clinic since your last visit? Hospitalized since your last visit? Yes When: 11/6/18 Where: [facility] Reason for visit: DKA 2. Have you seen or consulted any other health care providers outside of the [facility] since your last visit? Include any pap smears or colon screening.  No

## 2018-11-16 ENCOUNTER — HOME CARE VISIT (OUTPATIENT)
Dept: SCHEDULING | Facility: HOME HEALTH | Age: 78
End: 2018-11-16
Payer: MEDICARE

## 2018-11-16 PROCEDURE — 3331090002 HH PPS REVENUE DEBIT

## 2018-11-16 PROCEDURE — 3331090001 HH PPS REVENUE CREDIT

## 2018-11-16 PROCEDURE — G0299 HHS/HOSPICE OF RN EA 15 MIN: HCPCS

## 2018-11-16 NOTE — PROGRESS NOTES
[address] [place] [address] and Primary Care 
[place] [address] 
[address] [place] [address] Phone:  [phone]  Fax: [phone] Chief Complaint Patient presents with  Blood sugar problem Patient recently admitted to [facility] for DKA. .   
 
SUBJECTIVE: 
  Chitra Jackson is a 69 y.o. female Comes in for follow up. Her daughter comes with her today. She was recently hospitalized for another episode of DKA. Things that I have asked her to do she is not doing. She has no significant supervision at home to ensure compliance. Technically she needs to be on sliding scale coverage accompanied by prandial coverage. This cannot be done because there is no one to give the patient specific directions as to what she needs to do, plus she is not checking blood sugars ac and hs. Since being home the rescue squad had been called on two separate occasions. She is taking prandial insulin, 2 units before each meal, which I cannot adjust until I have an idea of what her blood sugars are at home, as well as her basal insulin, which is 16 units. I felt she was being basalized too high, which was leading to the nocturnal hypoglycemia, but in order to accommodate elevated blood sugars during the day, prandial insulin would have to be increased. Her diabetic control is compromised by her reduction in significant short term memory. She has a past history of primary hypertension, dyslipidemia and hypothyroidism. She states she is compliant with these medications. Current Outpatient Medications Medication Sig Dispense Refill  insulin aspart U-100 (NOVOLOG) 100 unit/mL inpn 2 units AC breakfast,lunch, and dinner 1 Adjustable Dose Pre-filled Pen Syringe 11  
 insulin degludec (TRESIBA FLEXTOUCH U-100) 100 unit/mL (3 mL) inpn 20 Units by SubCUTAneous route daily.  14 units every morning 1 Adjustable Dose Pre-filled Pen Syringe 11  
  pravastatin (PRAVACHOL) 80 mg tablet Take 1 Tab by mouth daily. 90 Tab 3  
 acetaminophen (TYLENOL) 500 mg tablet Take 500 mg by mouth every six (6) hours as needed for Pain.  fenofibrate nanocrystallized (TRICOR) 145 mg tablet Take 145 mg by mouth daily.  levothyroxine (SYNTHROID) 150 mcg tablet TAKE 1 TABLET BY MOUTH EVERY DAY 30 Tab 11  
 amLODIPine (NORVASC) 10 mg tablet TAKE 1 TABLET BY MOUTH EVERY MORNING 90 Tab 3  clopidogrel (PLAVIX) 75 mg tab TAKE 1 TAB BY MOUTH DAILY. 30 Tab 11  
 brimonidine (ALPHAGAN) 0.15 % ophthalmic solution Administer 1 Drop to both eyes two (2) times a day. Past Medical History:  
Diagnosis Date  Diabetes ([facility] [address]  Heart failure ([facility] [address]   
 unknown to family  Hypercholesteremia  Hypertension  Stroke ([facility] [address]  Thyroid disease Past Surgical History:  
Procedure Laterality Date  HX GYN    
 HX HEENT    
 thyroidectomy  HX HYSTERECTOMY  REMOVAL GALLBLADDER    
 THYROIDECTOMY No Known Allergies REVIEW OF SYSTEMS: 
General: negative for - chills or fever ENT: negative for - headaches, nasal congestion or tinnitus Respiratory: negative for - cough, hemoptysis, shortness of breath or wheezing Cardiovascular : negative for - chest pain, edema, palpitations or shortness of breath Gastrointestinal: negative for - abdominal pain, blood in stools, heartburn or nausea/vomiting Genito-Urinary: no dysuria, trouble voiding, or hematuria Musculoskeletal: negative for - gait disturbance, joint pain, joint stiffness or joint swelling Neurological: no TIA or stroke symptoms Hematologic: no bruises, no bleeding, no swollen glands Integument: no lumps, mole changes, nail changes or rash Endocrine: no malaise/lethargy or unexpected weight changes Social History Socioeconomic History  Marital status:  Spouse name: Not on file  Number of children: 1  Years of education: Not on file  Highest education level: Not on file Social Needs  Financial resource strain: Not on file  Food insecurity - worry: Not on file  Food insecurity - inability: Not on file  Transportation needs - medical: Not on file  Transportation needs - non-medical: Not on file Occupational History  Occupation: retired Tobacco Use  Smoking status: Never Smoker  Smokeless tobacco: Never Used Substance and Sexual Activity  Alcohol use: No  
  Comment: been years  Drug use: No  
 Sexual activity: Not Currently Other Topics Concern  Not on file Social History Narrative  Not on file Family History Problem Relation Age of Onset  Diabetes Father  No Known Problems Mother OBJECTIVE: 
 
Visit Vitals /69 Pulse 76 Temp 98.4 °F (36.9 °C) (Oral) Resp 16 Ht 5' 3\" (1.6 m) Wt 172 lb 14.4 oz (78.4 kg) SpO2 95% BMI 30.63 kg/m² CONSTITUTIONAL: well , well nourished, appears age appropriate EYES: perrla, eom intact ENMT:moist mucous membranes, pharynx clear NECK: supple. Thyroid normal 
RESPIRATORY: Chest: clear to ascultation and percussion CARDIOVASCULAR: Heart: regular rate and rhythm GASTROINTESTINAL: Abdomen: soft, bowel sounds active HEMATOLOGIC: no pathological lymph nodes palpated MUSCULOSKELETAL: Extremities: no edema, pulse 1+ INTEGUMENT: No unusual rashes or suspicious skin lesions noted. Nails appear normal. 
NEUROLOGIC: non-focal exam  
MENTAL STATUS: alert and oriented, appropriate affect ASSESSMENT: 
1. Hyperglycemia due to type 1 diabetes mellitus ([facility] [address] 2. Essential hypertension 3. Dyslipidemia 4. Memory deficit 5. Hypothyroidism due to medication PLAN: 
 
1. As far as her diabetes is concerned, unfortunately I do not anticipate any significant improvement because there is not enough supervision at home.   Her daughter does not live with them and her contribution will be limited. She has a son, intellectually compromised, which makes compliance with recommended aspects of diabetic control extremely difficult. Again she needs to check blood sugars ac and hs, eat meals at the same time every day, including an hs snack and making sure her hs snack is at least 350 calories. Unless this is done, her control will be erratic and she will have repeated hospitalizations. 2. Her blood pressure is elevated, but she states she is compliant with her medication. I will make no adjustments today, but do so on return visit if this persists. 3. She will continue statin as prescribed. She is in a primary risk prevention mode. 4. She will also continue her thyroid supplement. 5.   Her current care is compromised significantly by defect in her short term memory. Follow-up Disposition: 
Return in about 3 weeks (around 12/6/2018).  
 
 
Eulalia Villarreal MD

## 2018-11-16 NOTE — ASSESSMENT & PLAN NOTE
Key Antihyperglycemic Medications   
    
  
 insulin aspart U-100 (NOVOLOG) 100 unit/mL inpn  (Taking) 2 units AC breakfast,lunch, and dinner  
 insulin degludec (TRESIBA FLEXTOUCH U-100) 100 unit/mL (3 mL) inpn  (Taking) 20 Units by SubCUTAneous route daily. 14 units every morning Other Key Diabetic Medications   
    
  
 pravastatin (PRAVACHOL) 80 mg tablet  (Taking) Take 1 Tab by mouth daily. fenofibrate nanocrystallized (TRICOR) 145 mg tablet  (Taking) Take 145 mg by mouth daily. Lab Results Component Value Date/Time Hemoglobin A1c 10.1 11/06/2018 05:22 PM  
 Glucose 51 11/08/2018 02:14 AM  
 Creatinine 0.83 11/08/2018 02:14 AM  
 Creatinine (POC) 1.4 10/19/2018 09:32 PM  
 Microalb/Creat ratio (ug/mg creat.) 3.1 07/10/2018 05:14 PM  
 
Diabetic Foot and Eye Exam HM Status Topic Date Due  
 Diabetic Foot Care  11/15/2018 (Originally 5/8/2016)  Eye Exam  11/15/2018 (Originally 5/8/2016)

## 2018-11-17 PROCEDURE — 3331090001 HH PPS REVENUE CREDIT

## 2018-11-17 PROCEDURE — 3331090002 HH PPS REVENUE DEBIT

## 2018-11-18 VITALS
HEART RATE: 72 BPM | OXYGEN SATURATION: 98 % | SYSTOLIC BLOOD PRESSURE: 150 MMHG | RESPIRATION RATE: 16 BRPM | DIASTOLIC BLOOD PRESSURE: 60 MMHG | TEMPERATURE: 98.6 F

## 2018-11-18 PROCEDURE — 3331090002 HH PPS REVENUE DEBIT

## 2018-11-18 PROCEDURE — 3331090001 HH PPS REVENUE CREDIT

## 2018-11-19 PROCEDURE — 3331090002 HH PPS REVENUE DEBIT

## 2018-11-19 PROCEDURE — 3331090001 HH PPS REVENUE CREDIT

## 2018-11-20 ENCOUNTER — HOME CARE VISIT (OUTPATIENT)
Dept: SCHEDULING | Facility: HOME HEALTH | Age: 78
End: 2018-11-20
Payer: MEDICARE

## 2018-11-20 PROCEDURE — 3331090001 HH PPS REVENUE CREDIT

## 2018-11-20 PROCEDURE — 3331090002 HH PPS REVENUE DEBIT

## 2018-11-21 PROCEDURE — 3331090002 HH PPS REVENUE DEBIT

## 2018-11-21 PROCEDURE — 3331090001 HH PPS REVENUE CREDIT

## 2018-11-22 ENCOUNTER — HOME CARE VISIT (OUTPATIENT)
Dept: SCHEDULING | Facility: HOME HEALTH | Age: 78
End: 2018-11-22
Payer: MEDICARE

## 2018-11-22 PROCEDURE — 3331090001 HH PPS REVENUE CREDIT

## 2018-11-22 PROCEDURE — 3331090002 HH PPS REVENUE DEBIT

## 2018-11-23 PROCEDURE — 3331090002 HH PPS REVENUE DEBIT

## 2018-11-23 PROCEDURE — 3331090001 HH PPS REVENUE CREDIT

## 2018-11-24 PROCEDURE — 3331090001 HH PPS REVENUE CREDIT

## 2018-11-24 PROCEDURE — 3331090002 HH PPS REVENUE DEBIT

## 2018-11-25 PROCEDURE — 3331090002 HH PPS REVENUE DEBIT

## 2018-11-25 PROCEDURE — 3331090001 HH PPS REVENUE CREDIT

## 2018-11-26 PROCEDURE — 3331090001 HH PPS REVENUE CREDIT

## 2018-11-26 PROCEDURE — 3331090002 HH PPS REVENUE DEBIT

## 2018-11-27 ENCOUNTER — HOME CARE VISIT (OUTPATIENT)
Dept: SCHEDULING | Facility: HOME HEALTH | Age: 78
End: 2018-11-27
Payer: MEDICARE

## 2018-11-27 VITALS
OXYGEN SATURATION: 98 % | SYSTOLIC BLOOD PRESSURE: 142 MMHG | HEART RATE: 61 BPM | TEMPERATURE: 97.2 F | DIASTOLIC BLOOD PRESSURE: 76 MMHG | RESPIRATION RATE: 18 BRPM

## 2018-11-27 PROCEDURE — 3331090001 HH PPS REVENUE CREDIT

## 2018-11-27 PROCEDURE — 3331090002 HH PPS REVENUE DEBIT

## 2018-11-27 PROCEDURE — G0299 HHS/HOSPICE OF RN EA 15 MIN: HCPCS

## 2018-11-28 PROCEDURE — 3331090001 HH PPS REVENUE CREDIT

## 2018-11-28 PROCEDURE — 3331090002 HH PPS REVENUE DEBIT

## 2018-11-29 ENCOUNTER — HOME CARE VISIT (OUTPATIENT)
Dept: SCHEDULING | Facility: HOME HEALTH | Age: 78
End: 2018-11-29
Payer: MEDICARE

## 2018-11-29 PROCEDURE — 3331090001 HH PPS REVENUE CREDIT

## 2018-11-29 PROCEDURE — 3331090002 HH PPS REVENUE DEBIT

## 2018-11-29 PROCEDURE — G0300 HHS/HOSPICE OF LPN EA 15 MIN: HCPCS

## 2018-11-30 PROCEDURE — 3331090002 HH PPS REVENUE DEBIT

## 2018-11-30 PROCEDURE — 3331090001 HH PPS REVENUE CREDIT

## 2018-12-01 VITALS
TEMPERATURE: 99.3 F | OXYGEN SATURATION: 97 % | HEART RATE: 82 BPM | DIASTOLIC BLOOD PRESSURE: 76 MMHG | RESPIRATION RATE: 20 BRPM | SYSTOLIC BLOOD PRESSURE: 148 MMHG

## 2018-12-01 PROCEDURE — 3331090001 HH PPS REVENUE CREDIT

## 2018-12-01 PROCEDURE — 3331090002 HH PPS REVENUE DEBIT

## 2018-12-02 PROCEDURE — 3331090001 HH PPS REVENUE CREDIT

## 2018-12-02 PROCEDURE — 3331090002 HH PPS REVENUE DEBIT

## 2018-12-03 ENCOUNTER — HOME CARE VISIT (OUTPATIENT)
Dept: SCHEDULING | Facility: HOME HEALTH | Age: 78
End: 2018-12-03
Payer: MEDICARE

## 2018-12-03 PROCEDURE — 3331090002 HH PPS REVENUE DEBIT

## 2018-12-03 PROCEDURE — 3331090001 HH PPS REVENUE CREDIT

## 2018-12-03 PROCEDURE — G0300 HHS/HOSPICE OF LPN EA 15 MIN: HCPCS

## 2018-12-03 NOTE — DISCHARGE SUMMARY
[facility] [address]    Giana Poster  MR#: [id number]  : 1940  ACCOUNT #: [de-identified]   ADMIT DATE: 2018  DISCHARGE DATE: 2018    HISTORY OF PRESENT ILLNESS:  The patient is a 80-year-old lady with known type 1B diabetes, who presented to the emergency room because her blood sugar was elevated. She otherwise felt well. She was evaluated and found to have a reduction in her CO2, suggesting mild ketoacidosis. Because of this, she was admitted. The patient did not take her insulin appropriately and historically, has not done so in spite of being instructed repetitively along with her family as to what need be done on a consistent basis. PAST MEDICAL HISTORY, SOCIAL HISTORY, REVIEW OF SYSTEMS, FAMILY HISTORY, PHYSICAL EXAMINATION:  As in admitting H and P.    LABORATORY VALUES:  Abnormalities on the comprehensive profile on admission revealed a CO2 of 10, BUN and creatinine 29 and 1.26, respectively, with a blood sugar 488. Hemoglobin A1c on 10/06 was at 10.1. On , CO2 was 22. She had actually been off of the drip for at least 4-6 hours. Hemoglobin was 10.7, white count 7.7. HOSPITAL COURSE:  The patient was admitted and transiently placed on an insulin drip. She remained on the drip for approximately 6 hours, but within that short time, CO2 normalized. I resumed her basal along with her prandial insulin. She continued to have drops in the morning, so I reduced her basal insulin, increasing her prandial insulin. At the time of discharge, she had been free of any symptomatic hypoglycemia for at least 48 hours. Again, information that I give the patient is not valid unless someone is present, specifically a relative is present at the time of the information sharing. The patient is not capable of remembering anything that I am telling her as far as what she needs to do.   This has been the recurring problem leading to the repetitive hospitalizations. FINAL DIAGNOSES:  1.  Mild diabetic ketoacidosis. 2.  Diabetes mellitus type 1B. 3.  Mild dementia. 4.  Primary hypertension. 5.  Dyslipidemia. 6.  Compensated hypothyroidism. DISPOSITION:    1. The patient will be discharged home ambulatory on an ADA diet with a bedtime snack, consuming at least 350 calories at bedtime. 2.  DISCHARGE MEDICATIONS:  Include the following:  Acetaminophen 650 q.4 hours p.r.n., amlodipine 10 mg daily, Alphagan 0.15% 1 drop both eyes b.i.d., clopidogrel 75 mg daily, fenofibrate 145 mg daily, prandial insulin 2 units before meals, breakfast, lunch and dinner, Tresiba 14 units q.a.m., Levoxyl 0.15 mg daily, along with Pravastatin 80 mg at bedtime. 3.  The patient will return to the office in the next 3-5 days. 4.  I instructed her on the importance of eating all of her meals at the same time every day. Secondly, she has to check blood sugars before meals and at bedtime. Finally, prandial insulin has to be taken before every meal and her basal insulin will be taken q.a.m. Titration will occur with calls to the office with blood sugars every 48 hours. 5.  The patient remains a FULL CODE.      CHIDI Coker MD       LAB/ARIC  D: 12/02/2018 15:30     T: 12/03/2018 09:58  JOB #: [id number]

## 2018-12-04 ENCOUNTER — OFFICE VISIT (OUTPATIENT)
Dept: INTERNAL MEDICINE CLINIC | Age: 78
End: 2018-12-04

## 2018-12-04 VITALS
SYSTOLIC BLOOD PRESSURE: 157 MMHG | HEART RATE: 82 BPM | BODY MASS INDEX: 29.93 KG/M2 | WEIGHT: 168.9 LBS | TEMPERATURE: 98.6 F | RESPIRATION RATE: 16 BRPM | DIASTOLIC BLOOD PRESSURE: 63 MMHG | HEIGHT: 63 IN | OXYGEN SATURATION: 97 %

## 2018-12-04 DIAGNOSIS — E10.65 HYPERGLYCEMIA DUE TO TYPE 1 DIABETES MELLITUS (HCC): Primary | ICD-10-CM

## 2018-12-04 DIAGNOSIS — E78.5 DYSLIPIDEMIA: ICD-10-CM

## 2018-12-04 DIAGNOSIS — F01.50 VASCULAR DEMENTIA WITHOUT BEHAVIORAL DISTURBANCE (HCC): ICD-10-CM

## 2018-12-04 DIAGNOSIS — I10 ESSENTIAL HYPERTENSION: ICD-10-CM

## 2018-12-04 PROCEDURE — 3331090002 HH PPS REVENUE DEBIT

## 2018-12-04 PROCEDURE — 3331090001 HH PPS REVENUE CREDIT

## 2018-12-04 NOTE — PROGRESS NOTES
Chief Complaint Patient presents with Chiquis Mercado Annual Wellness Visit 1. Have you been to the ER, urgent care clinic since your last visit? Hospitalized since your last visit? No 
 
2. Have you seen or consulted any other health care providers outside of the [address] [place] [address] since your last visit? Include any pap smears or colon screening.  No

## 2018-12-05 ENCOUNTER — HOME CARE VISIT (OUTPATIENT)
Dept: SCHEDULING | Facility: HOME HEALTH | Age: 78
End: 2018-12-05
Payer: MEDICARE

## 2018-12-05 PROCEDURE — 3331090002 HH PPS REVENUE DEBIT

## 2018-12-05 PROCEDURE — 3331090001 HH PPS REVENUE CREDIT

## 2018-12-05 NOTE — PROGRESS NOTES
[address] [place] [address] and Primary Care 
[place] [address] 
[address] [place] [address] [address] Phone:  [phone]  Fax: [phone] Chief Complaint Patient presents with [facility] Annual Wellness Visit John Rios SUBJECTIVE: 
  Miles Rey is a 69 y.o. female Comes in for return visit accompanied by her daughter today. She states that she feels great. The rescue squad has not been called. I, however, suspect this is related to the fact that she is hyperglycemic. She is not giving me any useful information as far as her sugars are concerned, which makes it almost impossible to adequately control her diabetes. Her daughter really does not say anything at this point. She is not capable of following directions unless someone is literally making her do it at the respective times. We talk about eating at the same time every day and taking her prandial insulin, as well as her basal insulin. I have no idea how low her sugars are at night and how high they are in the daytime. She continues with significant short term memory. She has a past history of primary hypertension and dyslipidemia. Current Outpatient Medications Medication Sig Dispense Refill  insulin aspart U-100 (NOVOLOG) 100 unit/mL inpn 2 units AC breakfast,lunch, and dinner (Patient taking differently: 2 Units by SubCUTAneous route three (3) times daily (with meals). 2 units AC breakfast,lunch, and dinner) 1 Adjustable Dose Pre-filled Pen Syringe 11  
 insulin degludec (TRESIBA FLEXTOUCH U-100) 100 unit/mL (3 mL) inpn 20 Units by SubCUTAneous route daily. 14 units every morning 1 Adjustable Dose Pre-filled Pen Syringe 11  
 pravastatin (PRAVACHOL) 80 mg tablet Take 1 Tab by mouth daily. 90 Tab 3  
 acetaminophen (TYLENOL) 500 mg tablet Take 500 mg by mouth every six (6) hours as needed for Pain.  fenofibrate nanocrystallized (TRICOR) 145 mg tablet Take 145 mg by mouth daily.  levothyroxine (SYNTHROID) 150 mcg tablet TAKE 1 TABLET BY MOUTH EVERY DAY 30 Tab 11  
 amLODIPine (NORVASC) 10 mg tablet TAKE 1 TABLET BY MOUTH EVERY MORNING 90 Tab 3  clopidogrel (PLAVIX) 75 mg tab TAKE 1 TAB BY MOUTH DAILY. 30 Tab 11  
 brimonidine (ALPHAGAN) 0.15 % ophthalmic solution Administer 1 Drop to both eyes two (2) times a day. Past Medical History:  
Diagnosis Date  Diabetes ([facility]  Heart failure ([facility]   
 unknown to family  Hypercholesteremia  Hypertension  Stroke ([facility]  Thyroid disease Past Surgical History:  
Procedure Laterality Date  HX GYN    
 HX HEENT    
 thyroidectomy  HX HYSTERECTOMY  REMOVAL GALLBLADDER    
 THYROIDECTOMY No Known Allergies REVIEW OF SYSTEMS: 
General: negative for - chills or fever ENT: negative for - headaches, nasal congestion or tinnitus Respiratory: negative for - cough, hemoptysis, shortness of breath or wheezing Cardiovascular : negative for - chest pain, edema, palpitations or shortness of breath Gastrointestinal: negative for - abdominal pain, blood in stools, heartburn or nausea/vomiting Genito-Urinary: no dysuria, trouble voiding, or hematuria Musculoskeletal: negative for - gait disturbance, joint pain, joint stiffness or joint swelling Neurological: no TIA or stroke symptoms Hematologic: no bruises, no bleeding, no swollen glands Integument: no lumps, mole changes, nail changes or rash Endocrine: no malaise/lethargy or unexpected weight changes Social History Socioeconomic History  Marital status:  Spouse name: Not on file  Number of children: 1  Years of education: Not on file  Highest education level: Not on file Occupational History  Occupation: retired Tobacco Use  Smoking status: Never Smoker  Smokeless tobacco: Never Used Substance and Sexual Activity  Alcohol use: No  
  Comment: been years  Drug use:  No  
  Sexual activity: Not Currently Family History Problem Relation Age of Onset  Diabetes Father  No Known Problems Mother OBJECTIVE: 
 
Visit Vitals /63 Pulse 82 Temp 98.6 °F (37 °C) (Oral) Resp 16 Ht 5' 3\" (1.6 m) Wt 168 lb 14.4 oz (76.6 kg) SpO2 97% BMI 29.92 kg/m² CONSTITUTIONAL: well , well nourished, appears age appropriate EYES: perrla, eom intact ENMT:moist mucous membranes, pharynx clear NECK: supple. Thyroid normal 
RESPIRATORY: Chest: clear to ascultation and percussion CARDIOVASCULAR: Heart: regular rate and rhythm GASTROINTESTINAL: Abdomen: soft, bowel sounds active HEMATOLOGIC: no pathological lymph nodes palpated MUSCULOSKELETAL: Extremities: no edema, pulse 1+ INTEGUMENT: No unusual rashes or suspicious skin lesions noted. Nails appear normal. 
NEUROLOGIC: non-focal exam  
MENTAL STATUS: alert and oriented, appropriate affect ASSESSMENT: 
1. Hyperglycemia due to type 1 diabetes mellitus ([address] 2. Vascular dementia without behavioral disturbance 3. Essential hypertension 4. Dyslipidemia PLAN: 
 
1. As far as her diabetes is concerned the patient is told she has to check blood sugars ac and preferably hs. I need blood sugars to make decisions and she needs this information to optimally control her diabetes. I again emphasized the importance of eating at the same time every day. The times are given to her again. I will increase her prandial insulin to three units before each meal.  I would not increase the basal insulin at this point until I have an idea of what her fasting sugars are in the morning, which will direct me as far as dosing her basal insulin. I explained to patient and the daughter that the reason she is having problems is because of hyperglycemia. Fortunately it is not high enough to lead to ketoacidosis. 2. Her short term memory continues and is actually getting worse.   A consideration for Aricept and Namenda are needed and I will consider this on return visit. 3. Her blood pressure is excellent today, no adjustments are made. 4. She will continue statin as prescribed in view of her increased cardiovascular risk. Follow-up Disposition: 
Return in about 3 weeks (around 12/25/2018).  
 
 
Lalo Montalvo MD

## 2018-12-06 VITALS
OXYGEN SATURATION: 98 % | DIASTOLIC BLOOD PRESSURE: 68 MMHG | RESPIRATION RATE: 20 BRPM | TEMPERATURE: 98.2 F | SYSTOLIC BLOOD PRESSURE: 132 MMHG | HEART RATE: 74 BPM

## 2018-12-06 PROCEDURE — 3331090002 HH PPS REVENUE DEBIT

## 2018-12-06 PROCEDURE — 3331090001 HH PPS REVENUE CREDIT

## 2018-12-07 PROCEDURE — 3331090002 HH PPS REVENUE DEBIT

## 2018-12-07 PROCEDURE — 3331090001 HH PPS REVENUE CREDIT

## 2018-12-08 PROCEDURE — 3331090002 HH PPS REVENUE DEBIT

## 2018-12-08 PROCEDURE — 3331090001 HH PPS REVENUE CREDIT

## 2018-12-09 PROCEDURE — 3331090002 HH PPS REVENUE DEBIT

## 2018-12-09 PROCEDURE — 3331090001 HH PPS REVENUE CREDIT

## 2018-12-10 ENCOUNTER — HOME CARE VISIT (OUTPATIENT)
Dept: HOME HEALTH SERVICES | Facility: HOME HEALTH | Age: 78
End: 2018-12-10
Payer: MEDICARE

## 2018-12-10 PROCEDURE — 3331090001 HH PPS REVENUE CREDIT

## 2018-12-10 PROCEDURE — 3331090002 HH PPS REVENUE DEBIT

## 2018-12-11 PROCEDURE — 3331090001 HH PPS REVENUE CREDIT

## 2018-12-11 PROCEDURE — 3331090002 HH PPS REVENUE DEBIT

## 2018-12-12 ENCOUNTER — HOME CARE VISIT (OUTPATIENT)
Dept: SCHEDULING | Facility: HOME HEALTH | Age: 78
End: 2018-12-12
Payer: MEDICARE

## 2018-12-12 PROCEDURE — 3331090002 HH PPS REVENUE DEBIT

## 2018-12-12 PROCEDURE — G0300 HHS/HOSPICE OF LPN EA 15 MIN: HCPCS

## 2018-12-12 PROCEDURE — 3331090001 HH PPS REVENUE CREDIT

## 2018-12-13 VITALS
TEMPERATURE: 98.5 F | RESPIRATION RATE: 20 BRPM | SYSTOLIC BLOOD PRESSURE: 134 MMHG | HEART RATE: 75 BPM | OXYGEN SATURATION: 98 % | DIASTOLIC BLOOD PRESSURE: 72 MMHG

## 2018-12-13 PROCEDURE — 3331090001 HH PPS REVENUE CREDIT

## 2018-12-13 PROCEDURE — 3331090002 HH PPS REVENUE DEBIT

## 2018-12-14 PROCEDURE — 3331090001 HH PPS REVENUE CREDIT

## 2018-12-14 PROCEDURE — 3331090002 HH PPS REVENUE DEBIT

## 2018-12-15 PROCEDURE — 3331090001 HH PPS REVENUE CREDIT

## 2018-12-15 PROCEDURE — 3331090002 HH PPS REVENUE DEBIT

## 2018-12-16 PROCEDURE — 3331090002 HH PPS REVENUE DEBIT

## 2018-12-16 PROCEDURE — 3331090001 HH PPS REVENUE CREDIT

## 2018-12-17 ENCOUNTER — HOME CARE VISIT (OUTPATIENT)
Dept: SCHEDULING | Facility: HOME HEALTH | Age: 78
End: 2018-12-17
Payer: MEDICARE

## 2018-12-17 PROCEDURE — 3331090002 HH PPS REVENUE DEBIT

## 2018-12-17 PROCEDURE — 3331090001 HH PPS REVENUE CREDIT

## 2018-12-18 PROCEDURE — 3331090001 HH PPS REVENUE CREDIT

## 2018-12-18 PROCEDURE — 3331090002 HH PPS REVENUE DEBIT

## 2018-12-19 ENCOUNTER — HOME CARE VISIT (OUTPATIENT)
Dept: SCHEDULING | Facility: HOME HEALTH | Age: 78
End: 2018-12-19
Payer: MEDICARE

## 2018-12-19 VITALS
HEART RATE: 64 BPM | RESPIRATION RATE: 14 BRPM | OXYGEN SATURATION: 99 % | DIASTOLIC BLOOD PRESSURE: 64 MMHG | TEMPERATURE: 98 F | SYSTOLIC BLOOD PRESSURE: 120 MMHG

## 2018-12-19 PROCEDURE — G0299 HHS/HOSPICE OF RN EA 15 MIN: HCPCS

## 2018-12-19 PROCEDURE — 3331090002 HH PPS REVENUE DEBIT

## 2018-12-19 PROCEDURE — 3331090001 HH PPS REVENUE CREDIT

## 2018-12-20 PROCEDURE — 3331090002 HH PPS REVENUE DEBIT

## 2018-12-20 PROCEDURE — 3331090001 HH PPS REVENUE CREDIT

## 2019-01-08 ENCOUNTER — OFFICE VISIT (OUTPATIENT)
Dept: INTERNAL MEDICINE CLINIC | Age: 79
End: 2019-01-08

## 2019-01-08 VITALS
BODY MASS INDEX: 29.66 KG/M2 | SYSTOLIC BLOOD PRESSURE: 187 MMHG | WEIGHT: 167.4 LBS | DIASTOLIC BLOOD PRESSURE: 74 MMHG | TEMPERATURE: 98.8 F | RESPIRATION RATE: 16 BRPM | HEART RATE: 77 BPM | OXYGEN SATURATION: 96 % | HEIGHT: 63 IN

## 2019-01-08 DIAGNOSIS — Z13.39 SCREENING FOR ALCOHOLISM: ICD-10-CM

## 2019-01-08 DIAGNOSIS — R41.3 MEMORY DEFICIT: ICD-10-CM

## 2019-01-08 DIAGNOSIS — I10 ESSENTIAL HYPERTENSION: ICD-10-CM

## 2019-01-08 DIAGNOSIS — E10.65 HYPERGLYCEMIA DUE TO TYPE 1 DIABETES MELLITUS (HCC): Primary | ICD-10-CM

## 2019-01-08 DIAGNOSIS — Z13.31 SCREENING FOR DEPRESSION: ICD-10-CM

## 2019-01-08 DIAGNOSIS — Z00.00 WELLNESS EXAMINATION: ICD-10-CM

## 2019-01-08 DIAGNOSIS — E78.5 DYSLIPIDEMIA: ICD-10-CM

## 2019-01-08 NOTE — PROGRESS NOTES
Chief Complaint Patient presents with mele[facility] Annual Wellness Visit 1. Have you been to the ER, urgent care clinic since your last visit? Hospitalized since your last visit? No 
 
2. Have you seen or consulted any other health care providers outside of the [address] [place] [address] since your last visit? Include any pap smears or colon screening.  No

## 2019-01-09 LAB — FRUCTOSAMINE SERPL-SCNC: 594 UMOL/L (ref 0–285)

## 2019-01-09 NOTE — PATIENT INSTRUCTIONS
Medicare Wellness Visit, Female The best way to live healthy is to have a lifestyle where you eat a well-balanced diet, exercise regularly, limit alcohol use, and quit all forms of tobacco/nicotine, if applicable. Regular preventive services are another way to keep healthy. Preventive services (vaccines, screening tests, monitoring & exams) can help personalize your care plan, which helps you manage your own care. Screening tests can find health problems at the earliest stages, when they are easiest to treat. [facility] follows the current, evidence-based guidelines published by the [facility] Kobe Aimee ([facility]) when recommending preventive services for our patients. Because we follow these guidelines, sometimes recommendations change over time as research supports it. (For example, mammograms used to be recommended annually. Even though Medicare will still pay for an annual mammogram, the newer guidelines recommend a mammogram every two years for women of average risk.) Of course, you and your doctor may decide to screen more often for some diseases, based on your risk and your health status. Preventive services for you include: - Medicare offers their members a free annual wellness visit, which is time for you and your primary care provider to discuss and plan for your preventive service needs. Take advantage of this benefit every year! 
-All adults over the age of 72 should receive the recommended pneumonia vaccines. Current USPSTF guidelines recommend a series of two vaccines for the best pneumonia protection.  
-All adults should have a flu vaccine yearly and a tetanus vaccine every 10 years. All adults age 61 and older should receive a shingles vaccine once in their lifetime.   
-A bone mass density test is recommended when a woman turns 65 to screen for osteoporosis. This test is only recommended one time, as a screening. Some providers will use this same test as a disease monitoring tool if you already have osteoporosis. -All adults age 38-68 who are overweight should have a diabetes screening test once every three years.  
-Other screening tests and preventive services for persons with diabetes include: an eye exam to screen for diabetic retinopathy, a kidney function test, a foot exam, and stricter control over your cholesterol.  
-Cardiovascular screening for adults with routine risk involves an electrocardiogram (ECG) at intervals determined by your doctor.  
-Colorectal cancer screenings should be done for adults age 54-65 with no increased risk factors for colorectal cancer. There are a number of acceptable methods of screening for this type of cancer. Each test has its own benefits and drawbacks. Discuss with your doctor what is most appropriate for you during your annual wellness visit. The different tests include: colonoscopy (considered the best screening method), a fecal occult blood test, a fecal DNA test, and sigmoidoscopy. -Breast cancer screenings are recommended every other year for women of normal risk, age 54-69. 
-Cervical cancer screenings for women over age 72 are only recommended with certain risk factors.  
-All adults born between [facility] should be screened once for Hepatitis C. Here is a list of your current Health Maintenance items (your personalized list of preventive services) with a due date: 
Health Maintenance Due Topic Date Due  Shingles Vaccine (1 of 2) 12/31/1991 [facility] Diabetic Foot Care  05/08/2016 [facility] Eye Exam  05/08/2017  Cholesterol Test   07/14/2017  Flu Vaccine  08/01/2018

## 2019-01-09 NOTE — PROGRESS NOTES
[address] [place] [address] and Primary Care 
[place] [address] 
[address] [address] Phone:  [phone]  Fax: [phone] Chief Complaint Patient presents with [address] [place] [address] Annual Wellness Visit Artist Colin SUBJECTIVE: 
  Brain Prescott is a 69 y.o. female Comes in accompanied by her daughter. She missed her last appointment. Apparently she has not been to the emergency room. She is currently taking 14 units of her Tresiba and 3 units of her Novolog AC. She is not checking blood sugars. She has a past history of primary hypertension, dyslipidemia and probably mild dementia, not of the Alzheimer's variety. Current Outpatient Medications Medication Sig Dispense Refill  insulin aspart U-100 (NOVOLOG) 100 unit/mL inpn 2 units AC breakfast,lunch, and dinner (Patient taking differently: 2 Units by SubCUTAneous route three (3) times daily (with meals). 2 units AC breakfast,lunch, and dinner) 1 Adjustable Dose Pre-filled Pen Syringe 11  
 insulin degludec (TRESIBA FLEXTOUCH U-100) 100 unit/mL (3 mL) inpn 20 Units by SubCUTAneous route daily. 14 units every morning 1 Adjustable Dose Pre-filled Pen Syringe 11  
 pravastatin (PRAVACHOL) 80 mg tablet Take 1 Tab by mouth daily. 90 Tab 3  
 acetaminophen (TYLENOL) 500 mg tablet Take 500 mg by mouth every six (6) hours as needed for Pain.  fenofibrate nanocrystallized (TRICOR) 145 mg tablet Take 145 mg by mouth daily.  levothyroxine (SYNTHROID) 150 mcg tablet TAKE 1 TABLET BY MOUTH EVERY DAY 30 Tab 11  
 amLODIPine (NORVASC) 10 mg tablet TAKE 1 TABLET BY MOUTH EVERY MORNING 90 Tab 3  clopidogrel (PLAVIX) 75 mg tab TAKE 1 TAB BY MOUTH DAILY. 30 Tab 11  
 brimonidine (ALPHAGAN) 0.15 % ophthalmic solution Administer 1 Drop to both eyes two (2) times a day. Past Medical History:  
Diagnosis Date  Diabetes ([address]  Heart failure ([address]   
 unknown to family  Hypercholesteremia  Hypertension  Stroke ([facility]  Thyroid disease Past Surgical History:  
Procedure Laterality Date  HX GYN    
 HX HEENT    
 thyroidectomy  HX HYSTERECTOMY  REMOVAL GALLBLADDER    
 THYROIDECTOMY No Known Allergies REVIEW OF SYSTEMS: 
General: negative for - chills or fever ENT: negative for - headaches, nasal congestion or tinnitus Respiratory: negative for - cough, hemoptysis, shortness of breath or wheezing Cardiovascular : negative for - chest pain, edema, palpitations or shortness of breath Gastrointestinal: negative for - abdominal pain, blood in stools, heartburn or nausea/vomiting Genito-Urinary: no dysuria, trouble voiding, or hematuria Musculoskeletal: negative for - gait disturbance, joint pain, joint stiffness or joint swelling Neurological: no TIA or stroke symptoms Hematologic: no bruises, no bleeding, no swollen glands Integument: no lumps, mole changes, nail changes or rash Endocrine: no malaise/lethargy or unexpected weight changes Social History Socioeconomic History  Marital status:  Spouse name: Not on file  Number of children: 1  Years of education: Not on file  Highest education level: Not on file Occupational History  Occupation: retired Tobacco Use  Smoking status: Never Smoker  Smokeless tobacco: Never Used Substance and Sexual Activity  Alcohol use: No  
  Comment: been years  Drug use: No  
 Sexual activity: Not Currently Family History Problem Relation Age of Onset  Diabetes Father  No Known Problems Mother OBJECTIVE: 
 
Visit Vitals /74 Pulse 77 Temp 98.8 °F (37.1 °C) (Oral) Resp 16 Ht 5' 3\" (1.6 m) Wt 167 lb 6.4 oz (75.9 kg) SpO2 96% BMI 29.65 kg/m² CONSTITUTIONAL: well , well nourished, appears age appropriate EYES: perrla, eom intact ENMT:moist mucous membranes, pharynx clear NECK: supple. Thyroid normal 
RESPIRATORY: Chest: clear to ascultation and percussion CARDIOVASCULAR: Heart: regular rate and rhythm GASTROINTESTINAL: Abdomen: soft, bowel sounds active HEMATOLOGIC: no pathological lymph nodes palpated MUSCULOSKELETAL: Extremities: no edema, pulse 1+ INTEGUMENT: No unusual rashes or suspicious skin lesions noted. Nails appear normal. 
NEUROLOGIC: non-focal exam  
MENTAL STATUS: alert and oriented, appropriate affect ASSESSMENT: 
1. Hyperglycemia due to type 1 diabetes mellitus ([address] 2. Essential hypertension 3. Dyslipidemia 4. Memory deficit PLAN: 
 
1. Fructosamine level will be obtained as it relates to her diabetes. I give her a Chaya device to check blood sugars and hopefully this can be mastered. The daughter will supervise this. I remind the patient of the importance of eating at the same time every day. 2. Blood pressure is excellent, no adjustments will be made. 3. She will continue her statin as prescribed. 4. Her mild dementia persists and impacts her compliance because of impairment of her short term memory. . 
Orders Placed This Encounter  FRUCTOSAMINE Follow-up Disposition: Not on File Harriet Marte MD 
This is the Subsequent Medicare Annual Wellness Exam, performed 12 months or more after the Initial AWV or the last Subsequent AWV I have reviewed the patient's medical history in detail and updated the computerized patient record. History Past Medical History:  
Diagnosis Date  Diabetes ([address]  Heart failure ([address]   
 unknown to family  Hypercholesteremia  Hypertension  Stroke ([address]  Thyroid disease Past Surgical History:  
Procedure Laterality Date  HX GYN    
 HX HEENT    
 thyroidectomy  HX HYSTERECTOMY  REMOVAL GALLBLADDER    
 THYROIDECTOMY Current Outpatient Medications Medication Sig Dispense Refill  insulin aspart U-100 (NOVOLOG) 100 unit/mL inpn 2 units AC breakfast,lunch, and dinner (Patient taking differently: 2 Units by SubCUTAneous route three (3) times daily (with meals). 2 units AC breakfast,lunch, and dinner) 1 Adjustable Dose Pre-filled Pen Syringe 11  
 insulin degludec (TRESIBA FLEXTOUCH U-100) 100 unit/mL (3 mL) inpn 20 Units by SubCUTAneous route daily. 14 units every morning 1 Adjustable Dose Pre-filled Pen Syringe 11  
 pravastatin (PRAVACHOL) 80 mg tablet Take 1 Tab by mouth daily. 90 Tab 3  
 acetaminophen (TYLENOL) 500 mg tablet Take 500 mg by mouth every six (6) hours as needed for Pain.  fenofibrate nanocrystallized (TRICOR) 145 mg tablet Take 145 mg by mouth daily.  levothyroxine (SYNTHROID) 150 mcg tablet TAKE 1 TABLET BY MOUTH EVERY DAY 30 Tab 11  
 amLODIPine (NORVASC) 10 mg tablet TAKE 1 TABLET BY MOUTH EVERY MORNING 90 Tab 3  clopidogrel (PLAVIX) 75 mg tab TAKE 1 TAB BY MOUTH DAILY. 30 Tab 11  
 brimonidine (ALPHAGAN) 0.15 % ophthalmic solution Administer 1 Drop to both eyes two (2) times a day. No Known Allergies Family History Problem Relation Age of Onset  Diabetes Father  No Known Problems Mother Social History Tobacco Use  Smoking status: Never Smoker  Smokeless tobacco: Never Used Substance Use Topics  Alcohol use: No  
  Comment: been years Patient Active Problem List  
Diagnosis Code  Dyslipidemia E78.5  Hypertension I10  
 Hypothyroidism E03.9  Memory deficit R41.3  DKA (diabetic ketoacidoses) ([facility]) E13.10  Hypoglycemia E16.2  Hyperglycemia due to type 1 diabetes mellitus ([facility] E10.65  Acute metabolic encephalopathy G08.37  Vascular dementia without behavioral disturbance F01.50  
 H/O: CVA (cerebrovascular accident) Z80.78 Depression Risk Factor Screening: PHQ over the last two weeks 1/8/2019 Little interest or pleasure in doing things Not at all Feeling down, depressed, irritable, or hopeless Not at all Total Score PHQ 2 0 Alcohol Risk Factor Screening: You do not drink alcohol or very rarely. Functional Ability and Level of Safety:  
Hearing Loss Hearing is good. Activities of Daily Living The home contains: no safety equipment. Patient does total self care Fall Risk Fall Risk Assessment, last 12 mths 1/8/2019 Able to walk? Yes Fall in past 12 months? No  
 
 
Abuse Screen Patient is not abused Cognitive Screening Evaluation of Cognitive Function: 
Has your family/caregiver stated any concerns about your memory: no 
Normal 
 
Patient Care Team  
Patient Care Team: 
Michelle Lenz MD as PCP - General (Internal Medicine) Brennan Mc RN as Ambulatory Care Navigator (General Practice) Assessment/Plan Education and counseling provided: 
Are appropriate based on today's review and evaluation Diagnoses and all orders for this visit: 
 
1. Hyperglycemia due to type 1 diabetes mellitus (HCC) 
-     FRUCTOSAMINE 2. Essential hypertension 3. Dyslipidemia 4. Memory deficit Health Maintenance Due Topic Date Due  Shingrix Vaccine Age 50> (1 of 2) 12/31/1991  
 FOOT EXAM Q1  05/08/2016  
 EYE EXAM RETINAL OR DILATED  05/08/2017  LIPID PANEL Q1  07/14/2017  Influenza Age 5 to Adult  08/01/2018

## 2019-02-12 ENCOUNTER — OFFICE VISIT (OUTPATIENT)
Dept: INTERNAL MEDICINE CLINIC | Age: 79
End: 2019-02-12

## 2019-02-12 VITALS
WEIGHT: 162.8 LBS | TEMPERATURE: 97.9 F | RESPIRATION RATE: 16 BRPM | OXYGEN SATURATION: 96 % | HEIGHT: 63 IN | HEART RATE: 63 BPM | DIASTOLIC BLOOD PRESSURE: 63 MMHG | BODY MASS INDEX: 28.84 KG/M2 | SYSTOLIC BLOOD PRESSURE: 110 MMHG

## 2019-02-12 DIAGNOSIS — E78.5 DYSLIPIDEMIA: ICD-10-CM

## 2019-02-12 DIAGNOSIS — E03.2 HYPOTHYROIDISM DUE TO MEDICATION: ICD-10-CM

## 2019-02-12 DIAGNOSIS — E10.65 HYPERGLYCEMIA DUE TO TYPE 1 DIABETES MELLITUS (HCC): Primary | ICD-10-CM

## 2019-02-12 DIAGNOSIS — I10 ESSENTIAL HYPERTENSION: ICD-10-CM

## 2019-02-12 DIAGNOSIS — R41.3 MEMORY DEFICIT: ICD-10-CM

## 2019-02-12 NOTE — PROGRESS NOTES
Chief Complaint Patient presents with  Diabetes 1 month follow up 1. Have you been to the ER, urgent care clinic since your last visit? Hospitalized since your last visit? No 
 
2. Have you seen or consulted any other health care providers outside of the [address] [place] [address] since your last visit? Include any pap smears or colon screening.  No

## 2019-02-13 NOTE — PROGRESS NOTES
[facility] 
[place] [address] 
[address] [address] Phone:  [phone]  Fax: [phone] Chief Complaint Patient presents with  Diabetes 1 month follow up Brooke Martini SUBJECTIVE: 
  Jaquan Pereira is a 69 y.o. female Comes in for return visit, not having the [place]. She could not afford it. I told the patient basically I would not have given her the device had I known she could not afford it. The device requires two pads, which are $160 a month, admittedly quite expensive. She could not give me any blood sugars because she could not remember any. She, however, has not had any interventional hypoglycemia. She remains on her basal bolus preparation of 3 units ac meals of Novolog and 20 units of the Burnard Malcolm. I cannot make any adjustments, although adjustments need to occur because she will not check her blood sugars. Additionally, I cannot trust her to eat at the same time every day. Interestingly, she was not accompanied in the room by any relatives. She has a past history of probable early dementia, dyslipidemia, and primary hypertension. Current Outpatient Medications Medication Sig Dispense Refill  flash glucose scanning reader (FREESTYLE KEVON 14 DAY READER) [facility] [place] Use to check blood sugar for 14 days. Dx.e11.9 2 Each 11  
 insulin aspart U-100 (NOVOLOG) 100 unit/mL inpn 2 units AC breakfast,lunch, and dinner (Patient taking differently: 2 Units by SubCUTAneous route three (3) times daily (with meals). 2 units AC breakfast,lunch, and dinner) 1 Adjustable Dose Pre-filled Pen Syringe 11  
 insulin degludec (TRESIBA FLEXTOUCH U-100) 100 unit/mL (3 mL) inpn 20 Units by SubCUTAneous route daily. 14 units every morning 1 Adjustable Dose Pre-filled Pen Syringe 11  
 pravastatin (PRAVACHOL) 80 mg tablet Take 1 Tab by mouth daily.  90 Tab 3  
 acetaminophen (TYLENOL) 500 mg tablet Take 500 mg by mouth every six (6) hours as needed for Pain.  fenofibrate nanocrystallized (TRICOR) 145 mg tablet Take 145 mg by mouth daily.  levothyroxine (SYNTHROID) 150 mcg tablet TAKE 1 TABLET BY MOUTH EVERY DAY 30 Tab 11  
 amLODIPine (NORVASC) 10 mg tablet TAKE 1 TABLET BY MOUTH EVERY MORNING 90 Tab 3  clopidogrel (PLAVIX) 75 mg tab TAKE 1 TAB BY MOUTH DAILY. 30 Tab 11  
 brimonidine (ALPHAGAN) 0.15 % ophthalmic solution Administer 1 Drop to both eyes two (2) times a day. Past Medical History:  
Diagnosis Date  Diabetes [facility]  Heart failure ([facility]   
 unknown to family  Hypercholesteremia  Hypertension  [facility]  Thyroid disease Past Surgical History:  
Procedure Laterality Date  HX GYN    
 HX HEENT    
 thyroidectomy  HX HYSTERECTOMY  REMOVAL GALLBLADDER    
 THYROIDECTOMY No Known Allergies REVIEW OF SYSTEMS: 
General: negative for - chills or fever ENT: negative for - headaches, nasal congestion or tinnitus Respiratory: negative for - cough, hemoptysis, shortness of breath or wheezing Cardiovascular : negative for - chest pain, edema, palpitations or shortness of breath Gastrointestinal: negative for - abdominal pain, blood in stools, heartburn or nausea/vomiting Genito-Urinary: no dysuria, trouble voiding, or hematuria Musculoskeletal: negative for - gait disturbance, joint pain, joint stiffness or joint swelling Neurological: no TIA or stroke symptoms Hematologic: no bruises, no bleeding, no swollen glands Integument: no lumps, mole changes, nail changes or rash Endocrine: no malaise/lethargy or unexpected weight changes Social History Socioeconomic History  Marital status:  Spouse name: Not on file  Number of children: 1  Years of education: Not on file  Highest education level: Not on file Occupational History  Occupation: retired Tobacco Use  Smoking status: Never Smoker  Smokeless tobacco: Never Used Substance and Sexual Activity  Alcohol use: No  
  Comment: been years  Drug use: No  
 Sexual activity: Not Currently Family History Problem Relation Age of Onset  Diabetes Father  No Known Problems Mother OBJECTIVE: 
 
Visit Vitals /63 Pulse 63 Temp 97.9 °F (36.6 °C) (Oral) Resp 16 Ht 5' 3\" (1.6 m) Wt 162 lb 12.8 oz (73.8 kg) SpO2 96% BMI 28.84 kg/m² CONSTITUTIONAL: well , well nourished, appears age appropriate EYES: perrla, eom intact ENMT:moist mucous membranes, pharynx clear NECK: supple. Thyroid normal 
RESPIRATORY: Chest: clear to ascultation and percussion CARDIOVASCULAR: Heart: regular rate and rhythm GASTROINTESTINAL: Abdomen: soft, bowel sounds active HEMATOLOGIC: no pathological lymph nodes palpated MUSCULOSKELETAL: Extremities: no edema, pulse 1+ INTEGUMENT: No unusual rashes or suspicious skin lesions noted. Nails appear normal. 
NEUROLOGIC: non-focal exam  
MENTAL STATUS: alert and oriented, appropriate affect ASSESSMENT: 
1. Hyperglycemia due to type 1 diabetes mellitus ([address] 2. Hypothyroidism due to medication 3. Dyslipidemia 4. Memory deficit 5. Essential hypertension PLAN: 
 
1. I have no idea how the patient's diabetes is doing, but I will await the results of her hemoglobin A1c. 
2. From a thyroid perspective she will continue her thyroid supplement. Her last TSH was excellent. 3. She will continue statin and efficacy and compliance will be assessed. 4. As far as her memory is concerned, she is no worse, but she still requires assistance because of impairment of her short term memory. 5. Her blood pressure is excellent today. No adjustments are made. Addendum:  I am quite concerned by the lack of family members that are available with her. She really needs active assistance and I just do not think there is enough attention focused at this point. . 
Orders Placed This Encounter  LIPID PANEL  
  HEMOGLOBIN A1C WITH EAG Follow-up Disposition: 
Return in about 4 weeks (around 3/12/2019).  
 
 
Faith Kilpatrick MD

## 2019-02-14 LAB
CHOLEST SERPL-MCNC: 158 MG/DL (ref 100–199)
EST. AVERAGE GLUCOSE BLD GHB EST-MCNC: 303 MG/DL
HBA1C MFR BLD: 12.2 % (ref 4.8–5.6)
HDLC SERPL-MCNC: 84 MG/DL
LDLC SERPL CALC-MCNC: 62 MG/DL (ref 0–99)
TRIGL SERPL-MCNC: 59 MG/DL (ref 0–149)
VLDLC SERPL CALC-MCNC: 12 MG/DL (ref 5–40)

## 2019-04-04 ENCOUNTER — OFFICE VISIT (OUTPATIENT)
Dept: INTERNAL MEDICINE CLINIC | Age: 79
End: 2019-04-04

## 2019-04-04 VITALS
HEART RATE: 67 BPM | BODY MASS INDEX: 29.11 KG/M2 | HEIGHT: 63 IN | DIASTOLIC BLOOD PRESSURE: 54 MMHG | OXYGEN SATURATION: 96 % | SYSTOLIC BLOOD PRESSURE: 137 MMHG | WEIGHT: 164.3 LBS | RESPIRATION RATE: 16 BRPM | TEMPERATURE: 98.3 F

## 2019-04-04 DIAGNOSIS — E10.65 HYPERGLYCEMIA DUE TO TYPE 1 DIABETES MELLITUS (HCC): Primary | ICD-10-CM

## 2019-04-04 DIAGNOSIS — I10 ESSENTIAL HYPERTENSION: ICD-10-CM

## 2019-04-04 DIAGNOSIS — F01.50 VASCULAR DEMENTIA WITHOUT BEHAVIORAL DISTURBANCE (HCC): ICD-10-CM

## 2019-04-04 DIAGNOSIS — E78.5 DYSLIPIDEMIA: ICD-10-CM

## 2019-04-04 PROBLEM — G93.41 ACUTE METABOLIC ENCEPHALOPATHY: Status: RESOLVED | Noted: 2018-10-20 | Resolved: 2019-04-04

## 2019-04-04 NOTE — PROGRESS NOTES
Chief Complaint Patient presents with  Blood sugar problem 3 month follow up 1. Have you been to the ER, urgent care clinic since your last visit? Hospitalized since your last visit? No 
 
2. Have you seen or consulted any other health care providers outside of the [address] [place] [address] since your last visit? Include any pap smears or colon screening.  No

## 2019-04-05 NOTE — PROGRESS NOTES
[facility] 
[place] [address] 
[address] [address] Phone:  [phone]  Fax: [phone] Chief Complaint Patient presents with  Blood sugar problem 3 month follow up Jadiel Wagner SUBJECTIVE: 
  Malou Olivera is a 69 y.o. female Comes in for return visit accompanied by her daughter. She has no blood sugars to give me. She basically fabricated sugars for fasting and a.c. dinner sugars. She has not had any interventional hypoglycemia. This lack of intervention reflects chronic hyperglycemia. She remains on her basal insulin, which is [place] 12 units, along with her short acting insulin analog at 3 units a.c. meals. She continues to have significant memory deficit, primarily in the domain of short term memory. She needs assistance. The family is providing all that they can at this point. I again emphasized the importance of eating at the same time every day. Interestingly she can tell me the things she needs to do, including eating in a timely fashion and the times that she is supposed to eat, but she has not acted upon. She has a past history of primary hypertension, dyslipidemia and hypothyroidism. She is quite animated and answers questions quite appropriately, although probably a bit fabricated in certain instances and she cannot really remember all the details. Current Outpatient Medications Medication Sig Dispense Refill  fenofibrate nanocrystallized (TRICOR) 145 mg tablet TAKE 1 TABLET BY MOUTH EVERY DAY 30 Tab 11  
 flash glucose scanning reader (FREESTYLE KEVON 14 DAY READER) [facility] [place] Use to check blood sugar for 14 days. Dx.e11.9 2 Each 11  
 insulin aspart U-100 (NOVOLOG) 100 unit/mL inpn 2 units AC breakfast,lunch, and dinner (Patient taking differently: 2 Units by SubCUTAneous route three (3) times daily (with meals).  2 units AC breakfast,lunch, and dinner) 1 Adjustable Dose Pre-filled Pen Syringe 11  
  insulin degludec (TRESIBA FLEXTOUCH U-100) 100 unit/mL (3 mL) inpn 20 Units by SubCUTAneous route daily. 14 units every morning 1 Adjustable Dose Pre-filled Pen Syringe 11  
 pravastatin (PRAVACHOL) 80 mg tablet Take 1 Tab by mouth daily. 90 Tab 3  
 acetaminophen (TYLENOL) 500 mg tablet Take 500 mg by mouth every six (6) hours as needed for Pain.  levothyroxine (SYNTHROID) 150 mcg tablet TAKE 1 TABLET BY MOUTH EVERY DAY 30 Tab 11  
 amLODIPine (NORVASC) 10 mg tablet TAKE 1 TABLET BY MOUTH EVERY MORNING 90 Tab 3  clopidogrel (PLAVIX) 75 mg tab TAKE 1 TAB BY MOUTH DAILY. 30 Tab 11  
 brimonidine (ALPHAGAN) 0.15 % ophthalmic solution Administer 1 Drop to both eyes two (2) times a day. Past Medical History:  
Diagnosis Date  Diabetes ([facility] [facility]  Heart failure ([facility] [facility]   
 unknown to family  Hypercholesteremia  Hypertension  Stroke ([facility] [facility]  Thyroid disease Past Surgical History:  
Procedure Laterality Date  HX GYN    
 HX HEENT    
 thyroidectomy  HX HYSTERECTOMY  REMOVAL GALLBLADDER    
 THYROIDECTOMY No Known Allergies REVIEW OF SYSTEMS: 
General: negative for - chills or fever ENT: negative for - headaches, nasal congestion or tinnitus Respiratory: negative for - cough, hemoptysis, shortness of breath or wheezing Cardiovascular : negative for - chest pain, edema, palpitations or shortness of breath Gastrointestinal: negative for - abdominal pain, blood in stools, heartburn or nausea/vomiting Genito-Urinary: no dysuria, trouble voiding, or hematuria Musculoskeletal: negative for - gait disturbance, joint pain, joint stiffness or joint swelling Neurological: no TIA or stroke symptoms Hematologic: no bruises, no bleeding, no swollen glands Integument: no lumps, mole changes, nail changes or rash Endocrine: no malaise/lethargy or unexpected weight changes Social History Socioeconomic History  Marital status:   
 Spouse name: Not on file  Number of children: 1  Years of education: Not on file  Highest education level: Not on file Occupational History  Occupation: retired Tobacco Use  Smoking status: Never Smoker  Smokeless tobacco: Never Used Substance and Sexual Activity  Alcohol use: No  
  Comment: been years  Drug use: No  
 Sexual activity: Not Currently Family History Problem Relation Age of Onset  Diabetes Father  No Known Problems Mother OBJECTIVE: 
 
Visit Vitals /54 Pulse 67 Temp 98.3 °F (36.8 °C) (Oral) Resp 16 Ht 5' 3\" (1.6 m) Wt 164 lb 4.8 oz (74.5 kg) LMP  (Exact Date) SpO2 96% BMI 29.10 kg/m² CONSTITUTIONAL: well , well nourished, appears age appropriate EYES: perrla, eom intact ENMT:moist mucous membranes, pharynx clear NECK: supple. Thyroid normal 
RESPIRATORY: Chest: clear to ascultation and percussion CARDIOVASCULAR: Heart: regular rate and rhythm GASTROINTESTINAL: Abdomen: soft, bowel sounds active HEMATOLOGIC: no pathological lymph nodes palpated MUSCULOSKELETAL: Extremities: no edema, pulse 1+ INTEGUMENT: No unusual rashes or suspicious skin lesions noted. Nails appear normal. 
NEUROLOGIC: non-focal exam  
MENTAL STATUS: alert and oriented, appropriate affect ASSESSMENT: 
1. Hyperglycemia due to type 1 diabetes mellitus ([address] 2. Essential hypertension 3. Dyslipidemia 4. Vascular dementia without behavioral disturbance PLAN: 
 
1. From a diabetic perspective, her last hemoglobin A1c was 12. I have no blood sugars to go by. She plans to get her chem strips and check her blood sugars on a more consistent basis and hopefully write them down so she and I will have something to go by. I again emphasized the importance of eating at the same time every day, including her h.s. snack. 2. Blood pressure is excellent, no adjustments are made. 3. She will continue statin as prescribed. 4. As far as her presumed dementia is concerned, she is no worse than she has previously been. 5. I talked to her daughter at length also. Follow-up and Dispositions · Return in about 1 month (around 5/2/2019).  
  
 
 
 
Elvia Virk MD

## 2019-05-28 ENCOUNTER — OFFICE VISIT (OUTPATIENT)
Dept: INTERNAL MEDICINE CLINIC | Age: 79
End: 2019-05-28

## 2019-05-28 VITALS
OXYGEN SATURATION: 94 % | HEIGHT: 63 IN | RESPIRATION RATE: 20 BRPM | WEIGHT: 160.1 LBS | HEART RATE: 90 BPM | SYSTOLIC BLOOD PRESSURE: 125 MMHG | TEMPERATURE: 98.9 F | BODY MASS INDEX: 28.37 KG/M2 | DIASTOLIC BLOOD PRESSURE: 63 MMHG

## 2019-05-28 DIAGNOSIS — F01.50 VASCULAR DEMENTIA WITHOUT BEHAVIORAL DISTURBANCE (HCC): ICD-10-CM

## 2019-05-28 DIAGNOSIS — E03.2 HYPOTHYROIDISM DUE TO MEDICATION: ICD-10-CM

## 2019-05-28 DIAGNOSIS — E10.65 HYPERGLYCEMIA DUE TO TYPE 1 DIABETES MELLITUS (HCC): Primary | ICD-10-CM

## 2019-05-28 DIAGNOSIS — E78.5 DYSLIPIDEMIA: ICD-10-CM

## 2019-05-28 DIAGNOSIS — I10 ESSENTIAL HYPERTENSION: ICD-10-CM

## 2019-05-28 DIAGNOSIS — R41.3 MEMORY DEFICIT: ICD-10-CM

## 2019-05-28 NOTE — PROGRESS NOTES
1. Have you been to the ER, urgent care clinic since your last visit? Hospitalized since your last visit? No    2. Have you seen or consulted any other health care providers outside of the [address] [place] [address] since your last visit? Include any pap smears or colon screening.  No

## 2019-05-28 NOTE — PROGRESS NOTES
[facility]  [place] [address]  [address] [place] [address]  Phone:  [phone]  Fax: [phone]       Chief Complaint   Patient presents with    Hypertension     f/u   . SUBJECTIVE:    Christine García is a 69 y.o. female Comes in for return visit stating that she has done reasonably well. Blood sugars are quite vague, the ones that she gives me and suggest she has average sugars in the morning in the 90 range. Interestingly she has not had any interventional hypoglycemia, which is quite unusual.  Unfortunately this usually means her blood sugars are quite high. She is taking her basal insulin, plus her prandial insulin, 2 units before each meal.  The Ruddy Minors is 20 units every morning. She continues to have progressive memory deficits, most likely related to neurovascular dementia. She has a past history of primary hypertension, dyslipidemia and hypothyroidism. She remains reasonably active. Her daughter accompanies her today. Current Outpatient Medications   Medication Sig Dispense Refill    fenofibrate nanocrystallized (TRICOR) 145 mg tablet TAKE 1 TABLET BY MOUTH EVERY DAY 30 Tab 11    insulin aspart U-100 (NOVOLOG) 100 unit/mL inpn 2 units AC breakfast,lunch, and dinner (Patient taking differently: 2 Units by SubCUTAneous route three (3) times daily (with meals). 2 units AC breakfast,lunch, and dinner) 1 Adjustable Dose Pre-filled Pen Syringe 11    insulin degludec (TRESIBA FLEXTOUCH U-100) 100 unit/mL (3 mL) inpn 20 Units by SubCUTAneous route daily. 14 units every morning 1 Adjustable Dose Pre-filled Pen Syringe 11    pravastatin (PRAVACHOL) 80 mg tablet Take 1 Tab by mouth daily. 90 Tab 3    acetaminophen (TYLENOL) 500 mg tablet Take 500 mg by mouth every six (6) hours as needed for Pain.       levothyroxine (SYNTHROID) 150 mcg tablet TAKE 1 TABLET BY MOUTH EVERY DAY 30 Tab 11    amLODIPine (NORVASC) 10 mg tablet TAKE 1 TABLET BY MOUTH EVERY MORNING 90 Tab 3    clopidogrel (PLAVIX) 75 mg tab TAKE 1 TAB BY MOUTH DAILY. 30 Tab 11    brimonidine (ALPHAGAN) 0.15 % ophthalmic solution Administer 1 Drop to both eyes two (2) times a day.  flash glucose scanning reader ([place] KEVON 14 DAY READER) [facility] [place] to check blood sugar for 14 days.  Dx.e11.9 2 Each 11     Past Medical History:   Diagnosis Date    [facility]     Heart failure ([facility] [facility]     unknown to family    Hypercholesteremia     Hypertension     Stroke ([facility]     Thyroid disease      Past Surgical History:   Procedure Laterality Date    HX GYN      HX HEENT      thyroidectomy    HX HYSTERECTOMY      REMOVAL GALLBLADDER      THYROIDECTOMY       No Known Allergies      REVIEW OF SYSTEMS:  General: negative for - chills or fever  ENT: negative for - headaches, nasal congestion or tinnitus  Respiratory: negative for - cough, hemoptysis, shortness of breath or wheezing  Cardiovascular : negative for - chest pain, edema, palpitations or shortness of breath  Gastrointestinal: negative for - abdominal pain, blood in stools, heartburn or nausea/vomiting  Genito-Urinary: no dysuria, trouble voiding, or hematuria  Musculoskeletal: negative for - gait disturbance, joint pain, joint stiffness or joint swelling  Neurological: no TIA or stroke symptoms  Hematologic: no bruises, no bleeding, no swollen glands  Integument: no lumps, mole changes, nail changes or rash  Endocrine: no malaise/lethargy or unexpected weight changes      Social History     Socioeconomic History    Marital status:      Spouse name: Not on file    Number of children: 1    Years of education: Not on file    Highest education level: Not on file   Occupational History    Occupation: retired   Tobacco Use    Smoking status: Never Smoker    Smokeless tobacco: Never Used   Substance and Sexual Activity    Alcohol use: No     Comment: been years    Drug use: No    Sexual activity: Not Currently     Family History   Problem Relation Age of Onset    Diabetes Father     No Known Problems Mother        OBJECTIVE:    Visit Vitals  /63   Pulse 90   Temp 98.9 °F (37.2 °C) (Oral)   Resp 20   Ht 5' 3\" (1.6 m)   Wt 160 lb 1.6 oz (72.6 kg)   SpO2 94%   BMI 28.36 kg/m²     CONSTITUTIONAL: well , well nourished, appears age appropriate  EYES: perrla, eom intact  ENMT:moist mucous membranes, pharynx clear  NECK: supple. Thyroid normal  RESPIRATORY: Chest: clear to ascultation and percussion   CARDIOVASCULAR: Heart: regular rate and rhythm  GASTROINTESTINAL: Abdomen: soft, bowel sounds active  HEMATOLOGIC: no pathological lymph nodes palpated  MUSCULOSKELETAL: Extremities: no edema, pulse 1+   INTEGUMENT: No unusual rashes or suspicious skin lesions noted. Nails appear normal.  NEUROLOGIC: non-focal exam   MENTAL STATUS: alert and oriented, appropriate affect      ASSESSMENT:  1. Hyperglycemia due to type 1 diabetes mellitus ([address]    2. Essential hypertension    3. Hypothyroidism due to medication    4. Dyslipidemia    5. Vascular dementia without behavioral disturbance    6. Memory deficit        PLAN:    1. As far as her diabetes is concerned, I remind her of the importance of eating at the same time every day and preferably starting out earlier in the morning than late. Simplistically I also remind her of the importance of eliminating the consumption of processed carbohydrates from her diet. I will await the results of her hemoglobin A1c. If this is greater than 9, I will increase her prandial insulin to 3 units before each meal.  2. BP is excellent today, no adjustments are made. 3. TSH will be checked to ensure that she is indeed euthyroid. 4. She will continue statin as prescribed. Her last ApoB level was reasonable. 5. Her dementia is no worse than it has been before. I emphasized the importance of being as physically active and interactive with other people.       .  Orders Placed This Encounter    HEMOGLOBIN A1C WITH EAG    METABOLIC PANEL, BASIC    TSH 3RD GENERATION         Follow-up and Dispositions    · Return in about 6 weeks (around 7/9/2019).            Rylie Vila MD

## 2019-05-30 LAB
BUN SERPL-MCNC: 26 MG/DL (ref 8–27)
BUN/CREAT SERPL: 26 (ref 12–28)
CALCIUM SERPL-MCNC: 9.1 MG/DL (ref 8.7–10.3)
CHLORIDE SERPL-SCNC: 94 MMOL/L (ref 96–106)
CO2 SERPL-SCNC: 18 MMOL/L (ref 20–29)
CREAT SERPL-MCNC: 1.01 MG/DL (ref 0.57–1)
EST. AVERAGE GLUCOSE BLD GHB EST-MCNC: 344 MG/DL
GLUCOSE SERPL-MCNC: 661 MG/DL (ref 65–99)
HBA1C MFR BLD: 13.6 % (ref 4.8–5.6)
POTASSIUM SERPL-SCNC: 4.7 MMOL/L (ref 3.5–5.2)
SODIUM SERPL-SCNC: 131 MMOL/L (ref 134–144)
TSH SERPL DL<=0.005 MIU/L-ACNC: 7.82 UIU/ML (ref 0.45–4.5)

## 2019-06-06 RX ORDER — PEN NEEDLE, DIABETIC 31 GX5/16"
NEEDLE, DISPOSABLE MISCELLANEOUS
Qty: 60 PEN NEEDLE | Refills: 11 | Status: SHIPPED | OUTPATIENT
Start: 2019-06-06 | End: 2019-06-10

## 2019-06-10 ENCOUNTER — HOSPITAL ENCOUNTER (INPATIENT)
Age: 79
LOS: 5 days | Discharge: HOME | DRG: 637 | End: 2019-06-15
Attending: EMERGENCY MEDICINE | Admitting: HOSPITALIST
Payer: MEDICARE

## 2019-06-10 ENCOUNTER — APPOINTMENT (OUTPATIENT)
Dept: CT IMAGING | Age: 79
DRG: 637 | End: 2019-06-10
Attending: HOSPITALIST
Payer: MEDICARE

## 2019-06-10 ENCOUNTER — APPOINTMENT (OUTPATIENT)
Dept: GENERAL RADIOLOGY | Age: 79
DRG: 637 | End: 2019-06-10
Attending: EMERGENCY MEDICINE
Payer: MEDICARE

## 2019-06-10 DIAGNOSIS — E10.65 HYPERGLYCEMIA DUE TO TYPE 1 DIABETES MELLITUS (HCC): ICD-10-CM

## 2019-06-10 DIAGNOSIS — E87.20 LACTIC ACIDOSIS: ICD-10-CM

## 2019-06-10 DIAGNOSIS — E11.11 DIABETIC KETOACIDOSIS WITH COMA ASSOCIATED WITH TYPE 2 DIABETES MELLITUS (HCC): Primary | ICD-10-CM

## 2019-06-10 PROBLEM — R65.10 SIRS (SYSTEMIC INFLAMMATORY RESPONSE SYNDROME) (HCC): Status: ACTIVE | Noted: 2019-06-10

## 2019-06-10 PROBLEM — G93.40 ACUTE ENCEPHALOPATHY: Status: ACTIVE | Noted: 2019-06-10

## 2019-06-10 PROBLEM — N17.9 AKI (ACUTE KIDNEY INJURY) (HCC): Status: ACTIVE | Noted: 2019-06-10

## 2019-06-10 LAB
ALBUMIN SERPL-MCNC: 3.5 G/DL (ref 3.5–5)
ALBUMIN/GLOB SERPL: 1 {RATIO} (ref 1.1–2.2)
ALP SERPL-CCNC: 153 U/L (ref 45–117)
ALT SERPL-CCNC: 32 U/L (ref 12–78)
AMORPH CRY URNS QL MICRO: ABNORMAL
ANION GAP SERPL CALC-SCNC: 15 MMOL/L (ref 5–15)
ANION GAP SERPL CALC-SCNC: 24 MMOL/L (ref 5–15)
ANION GAP SERPL CALC-SCNC: ABNORMAL MMOL/L (ref 5–15)
ANION GAP SERPL CALC-SCNC: ABNORMAL MMOL/L (ref 5–15)
APPEARANCE UR: CLEAR
ARTERIAL PATENCY WRIST A: ABNORMAL
ARTERIAL PATENCY WRIST A: ABNORMAL
AST SERPL-CCNC: 33 U/L (ref 15–37)
ATRIAL RATE: 121 BPM
BACTERIA URNS QL MICRO: NEGATIVE /HPF
BASOPHILS # BLD: 0.2 K/UL (ref 0–0.1)
BASOPHILS NFR BLD: 1 % (ref 0–1)
BDY SITE: ABNORMAL
BDY SITE: ABNORMAL
BILIRUB SERPL-MCNC: 0.5 MG/DL (ref 0.2–1)
BILIRUB UR QL: NEGATIVE
BUN SERPL-MCNC: 29 MG/DL (ref 6–20)
BUN SERPL-MCNC: 34 MG/DL (ref 6–20)
BUN SERPL-MCNC: 35 MG/DL (ref 6–20)
BUN SERPL-MCNC: 36 MG/DL (ref 6–20)
BUN/CREAT SERPL: 19 (ref 12–20)
BUN/CREAT SERPL: 20 (ref 12–20)
BUN/CREAT SERPL: 21 (ref 12–20)
BUN/CREAT SERPL: 21 (ref 12–20)
CALCIUM SERPL-MCNC: 7.2 MG/DL (ref 8.5–10.1)
CALCIUM SERPL-MCNC: 7.9 MG/DL (ref 8.5–10.1)
CALCIUM SERPL-MCNC: 7.9 MG/DL (ref 8.5–10.1)
CALCIUM SERPL-MCNC: 8.9 MG/DL (ref 8.5–10.1)
CALCULATED P AXIS, ECG09: 47 DEGREES
CALCULATED R AXIS, ECG10: -48 DEGREES
CALCULATED T AXIS, ECG11: 80 DEGREES
CHLORIDE SERPL-SCNC: 101 MMOL/L (ref 97–108)
CHLORIDE SERPL-SCNC: 107 MMOL/L (ref 97–108)
CHLORIDE SERPL-SCNC: 107 MMOL/L (ref 97–108)
CHLORIDE SERPL-SCNC: 93 MMOL/L (ref 97–108)
CK MB CFR SERPL CALC: 6.8 % (ref 0–2.5)
CK MB SERPL-MCNC: 9.4 NG/ML (ref 5–25)
CK SERPL-CCNC: 139 U/L (ref 26–192)
CO2 SERPL-SCNC: 19 MMOL/L (ref 21–32)
CO2 SERPL-SCNC: 9 MMOL/L (ref 21–32)
CO2 SERPL-SCNC: <5 MMOL/L (ref 21–32)
CO2 SERPL-SCNC: <5 MMOL/L (ref 21–32)
COLOR UR: ABNORMAL
COMMENT, HOLDF: NORMAL
CREAT SERPL-MCNC: 1.37 MG/DL (ref 0.55–1.02)
CREAT SERPL-MCNC: 1.68 MG/DL (ref 0.55–1.02)
CREAT SERPL-MCNC: 1.76 MG/DL (ref 0.55–1.02)
CREAT SERPL-MCNC: 1.83 MG/DL (ref 0.55–1.02)
DIAGNOSIS, 93000: NORMAL
DIFFERENTIAL METHOD BLD: ABNORMAL
EOSINOPHIL # BLD: 0 K/UL (ref 0–0.4)
EOSINOPHIL NFR BLD: 0 % (ref 0–7)
EPITH CASTS URNS QL MICRO: ABNORMAL /LPF
ERYTHROCYTE [DISTWIDTH] IN BLOOD BY AUTOMATED COUNT: 13.4 % (ref 11.5–14.5)
GAS FLOW.O2 O2 DELIVERY SYS: ABNORMAL L/MIN
GAS FLOW.O2 O2 DELIVERY SYS: ABNORMAL L/MIN
GLOBULIN SER CALC-MCNC: 3.4 G/DL (ref 2–4)
GLUCOSE BLD STRIP.AUTO-MCNC: 213 MG/DL (ref 65–100)
GLUCOSE BLD STRIP.AUTO-MCNC: 287 MG/DL (ref 65–100)
GLUCOSE BLD STRIP.AUTO-MCNC: 347 MG/DL (ref 65–100)
GLUCOSE BLD STRIP.AUTO-MCNC: 406 MG/DL (ref 65–100)
GLUCOSE BLD STRIP.AUTO-MCNC: 462 MG/DL (ref 65–100)
GLUCOSE BLD STRIP.AUTO-MCNC: 516 MG/DL (ref 65–100)
GLUCOSE BLD STRIP.AUTO-MCNC: 557 MG/DL (ref 65–100)
GLUCOSE BLD STRIP.AUTO-MCNC: 562 MG/DL (ref 65–100)
GLUCOSE BLD STRIP.AUTO-MCNC: >600 MG/DL (ref 65–100)
GLUCOSE SERPL-MCNC: 285 MG/DL (ref 65–100)
GLUCOSE SERPL-MCNC: 527 MG/DL (ref 65–100)
GLUCOSE SERPL-MCNC: 766 MG/DL (ref 65–100)
GLUCOSE SERPL-MCNC: 904 MG/DL (ref 65–100)
GLUCOSE UR STRIP.AUTO-MCNC: >1000 MG/DL
HCT VFR BLD AUTO: 40 % (ref 35–47)
HGB BLD-MCNC: 12.4 G/DL (ref 11.5–16)
HGB UR QL STRIP: ABNORMAL
IMM GRANULOCYTES # BLD AUTO: 0.7 K/UL (ref 0–0.04)
IMM GRANULOCYTES NFR BLD AUTO: 4 % (ref 0–0.5)
KETONES UR QL STRIP.AUTO: 80 MG/DL
LACTATE SERPL-SCNC: 5 MMOL/L (ref 0.4–2)
LACTATE SERPL-SCNC: 7.8 MMOL/L (ref 0.4–2)
LACTATE SERPL-SCNC: 8.9 MMOL/L (ref 0.4–2)
LEUKOCYTE ESTERASE UR QL STRIP.AUTO: NEGATIVE
LYMPHOCYTES # BLD: 1.7 K/UL (ref 0.8–3.5)
LYMPHOCYTES NFR BLD: 10 % (ref 12–49)
MAGNESIUM SERPL-MCNC: 2.5 MG/DL (ref 1.6–2.4)
MCH RBC QN AUTO: 32 PG (ref 26–34)
MCHC RBC AUTO-ENTMCNC: 31 G/DL (ref 30–36.5)
MCV RBC AUTO: 103.4 FL (ref 80–99)
MONOCYTES # BLD: 1.7 K/UL (ref 0–1)
MONOCYTES NFR BLD: 10 % (ref 5–13)
NEUTS SEG # BLD: 12.4 K/UL (ref 1.8–8)
NEUTS SEG NFR BLD: 75 % (ref 32–75)
NITRITE UR QL STRIP.AUTO: NEGATIVE
NRBC # BLD: 0 K/UL (ref 0–0.01)
NRBC BLD-RTO: 0 PER 100 WBC
P-R INTERVAL, ECG05: 160 MS
PCO2 BLDV: <17 MMHG (ref 41–51)
PCO2 BLDV: <17 MMHG (ref 41–51)
PH BLDV: 6.9 [PH] (ref 7.32–7.42)
PH BLDV: 6.91 [PH] (ref 7.32–7.42)
PH UR STRIP: 5 [PH] (ref 5–8)
PHOSPHATE SERPL-MCNC: 8.5 MG/DL (ref 2.6–4.7)
PLATELET # BLD AUTO: 269 K/UL (ref 150–400)
PMV BLD AUTO: 10.8 FL (ref 8.9–12.9)
PO2 BLDV: 74 MMHG (ref 25–40)
PO2 BLDV: 75 MMHG (ref 25–40)
POTASSIUM SERPL-SCNC: 2.9 MMOL/L (ref 3.5–5.1)
POTASSIUM SERPL-SCNC: 3 MMOL/L (ref 3.5–5.1)
POTASSIUM SERPL-SCNC: 4.4 MMOL/L (ref 3.5–5.1)
POTASSIUM SERPL-SCNC: 5.9 MMOL/L (ref 3.5–5.1)
PROT SERPL-MCNC: 6.9 G/DL (ref 6.4–8.2)
PROT UR STRIP-MCNC: 30 MG/DL
Q-T INTERVAL, ECG07: 358 MS
QRS DURATION, ECG06: 104 MS
QTC CALCULATION (BEZET), ECG08: 508 MS
RBC # BLD AUTO: 3.87 M/UL (ref 3.8–5.2)
RBC #/AREA URNS HPF: ABNORMAL /HPF (ref 0–5)
RBC MORPH BLD: ABNORMAL
SAMPLES BEING HELD,HOLD: NORMAL
SERVICE CMNT-IMP: ABNORMAL
SODIUM SERPL-SCNC: 127 MMOL/L (ref 136–145)
SODIUM SERPL-SCNC: 136 MMOL/L (ref 136–145)
SODIUM SERPL-SCNC: 140 MMOL/L (ref 136–145)
SODIUM SERPL-SCNC: 141 MMOL/L (ref 136–145)
SP GR UR REFRACTOMETRY: 1.02 (ref 1–1.03)
SPECIMEN TYPE: ABNORMAL
SPECIMEN TYPE: ABNORMAL
TROPONIN I SERPL-MCNC: 0.22 NG/ML
TROPONIN I SERPL-MCNC: 1.86 NG/ML
UROBILINOGEN UR QL STRIP.AUTO: 0.2 EU/DL (ref 0.2–1)
VENTRICULAR RATE, ECG03: 121 BPM
WBC # BLD AUTO: 16.7 K/UL (ref 3.6–11)
WBC URNS QL MICRO: ABNORMAL /HPF (ref 0–4)

## 2019-06-10 PROCEDURE — 77030038269 HC DRN EXT URIN PURWCK BARD -A

## 2019-06-10 PROCEDURE — 85025 COMPLETE CBC W/AUTO DIFF WBC: CPT

## 2019-06-10 PROCEDURE — 74011250637 HC RX REV CODE- 250/637: Performed by: HOSPITALIST

## 2019-06-10 PROCEDURE — 74176 CT ABD & PELVIS W/O CONTRAST: CPT

## 2019-06-10 PROCEDURE — 77030011943

## 2019-06-10 PROCEDURE — 83735 ASSAY OF MAGNESIUM: CPT

## 2019-06-10 PROCEDURE — 74011000250 HC RX REV CODE- 250: Performed by: EMERGENCY MEDICINE

## 2019-06-10 PROCEDURE — 36415 COLL VENOUS BLD VENIPUNCTURE: CPT

## 2019-06-10 PROCEDURE — 81001 URINALYSIS AUTO W/SCOPE: CPT

## 2019-06-10 PROCEDURE — 74011636637 HC RX REV CODE- 636/637: Performed by: EMERGENCY MEDICINE

## 2019-06-10 PROCEDURE — 82550 ASSAY OF CK (CPK): CPT

## 2019-06-10 PROCEDURE — 71045 X-RAY EXAM CHEST 1 VIEW: CPT

## 2019-06-10 PROCEDURE — 70450 CT HEAD/BRAIN W/O DYE: CPT

## 2019-06-10 PROCEDURE — 82962 GLUCOSE BLOOD TEST: CPT

## 2019-06-10 PROCEDURE — 83605 ASSAY OF LACTIC ACID: CPT

## 2019-06-10 PROCEDURE — 96374 THER/PROPH/DIAG INJ IV PUSH: CPT

## 2019-06-10 PROCEDURE — 99285 EMERGENCY DEPT VISIT HI MDM: CPT

## 2019-06-10 PROCEDURE — 96361 HYDRATE IV INFUSION ADD-ON: CPT

## 2019-06-10 PROCEDURE — 74011250636 HC RX REV CODE- 250/636: Performed by: HOSPITALIST

## 2019-06-10 PROCEDURE — 94761 N-INVAS EAR/PLS OXIMETRY MLT: CPT

## 2019-06-10 PROCEDURE — 84484 ASSAY OF TROPONIN QUANT: CPT

## 2019-06-10 PROCEDURE — 80048 BASIC METABOLIC PNL TOTAL CA: CPT

## 2019-06-10 PROCEDURE — 65610000006 HC RM INTENSIVE CARE

## 2019-06-10 PROCEDURE — 74011000258 HC RX REV CODE- 258: Performed by: EMERGENCY MEDICINE

## 2019-06-10 PROCEDURE — 82803 BLOOD GASES ANY COMBINATION: CPT

## 2019-06-10 PROCEDURE — 74011250636 HC RX REV CODE- 250/636: Performed by: EMERGENCY MEDICINE

## 2019-06-10 PROCEDURE — 74011000250 HC RX REV CODE- 250: Performed by: HOSPITALIST

## 2019-06-10 PROCEDURE — 80053 COMPREHEN METABOLIC PANEL: CPT

## 2019-06-10 PROCEDURE — 84100 ASSAY OF PHOSPHORUS: CPT

## 2019-06-10 PROCEDURE — 74011000258 HC RX REV CODE- 258: Performed by: HOSPITALIST

## 2019-06-10 RX ORDER — ONDANSETRON 2 MG/ML
4 INJECTION INTRAMUSCULAR; INTRAVENOUS
Status: DISCONTINUED | OUTPATIENT
Start: 2019-06-10 | End: 2019-06-15 | Stop reason: HOSPADM

## 2019-06-10 RX ORDER — SODIUM CHLORIDE 0.9 % (FLUSH) 0.9 %
5-40 SYRINGE (ML) INJECTION AS NEEDED
Status: DISCONTINUED | OUTPATIENT
Start: 2019-06-10 | End: 2019-06-15 | Stop reason: HOSPADM

## 2019-06-10 RX ORDER — INSULIN LISPRO 100 [IU]/ML
INJECTION, SOLUTION INTRAVENOUS; SUBCUTANEOUS
Status: DISCONTINUED | OUTPATIENT
Start: 2019-06-10 | End: 2019-06-10

## 2019-06-10 RX ORDER — SODIUM BICARBONATE 1 MEQ/ML
50 SYRINGE (ML) INTRAVENOUS
Status: COMPLETED | OUTPATIENT
Start: 2019-06-10 | End: 2019-06-10

## 2019-06-10 RX ORDER — CALCIUM GLUCONATE 94 MG/ML
1 INJECTION, SOLUTION INTRAVENOUS ONCE
Status: DISCONTINUED | OUTPATIENT
Start: 2019-06-10 | End: 2019-06-10 | Stop reason: SDUPTHER

## 2019-06-10 RX ORDER — ASPIRIN 600 MG/1
300 SUPPOSITORY RECTAL DAILY
Status: DISCONTINUED | OUTPATIENT
Start: 2019-06-11 | End: 2019-06-10

## 2019-06-10 RX ORDER — DEXTROSE MONOHYDRATE 100 MG/ML
100 INJECTION, SOLUTION INTRAVENOUS AS NEEDED
Status: DISCONTINUED | OUTPATIENT
Start: 2019-06-10 | End: 2019-06-15

## 2019-06-10 RX ORDER — MAGNESIUM SULFATE 100 %
4 CRYSTALS MISCELLANEOUS AS NEEDED
Status: DISCONTINUED | OUTPATIENT
Start: 2019-06-10 | End: 2019-06-15

## 2019-06-10 RX ORDER — ASPIRIN 600 MG/1
300 SUPPOSITORY RECTAL DAILY
Status: DISCONTINUED | OUTPATIENT
Start: 2019-06-10 | End: 2019-06-11

## 2019-06-10 RX ORDER — ASPIRIN 300 MG/1
300 SUPPOSITORY RECTAL DAILY
Status: DISCONTINUED | OUTPATIENT
Start: 2019-06-10 | End: 2019-06-10

## 2019-06-10 RX ORDER — ASPIRIN 600 MG/1
300 SUPPOSITORY RECTAL DAILY
Status: DISCONTINUED | OUTPATIENT
Start: 2019-06-10 | End: 2019-06-10

## 2019-06-10 RX ORDER — INSULIN DEGLUDEC 100 U/ML
20 INJECTION, SOLUTION SUBCUTANEOUS DAILY
COMMUNITY
End: 2019-06-15

## 2019-06-10 RX ORDER — SODIUM CHLORIDE 9 MG/ML
150 INJECTION, SOLUTION INTRAVENOUS CONTINUOUS
Status: DISCONTINUED | OUTPATIENT
Start: 2019-06-10 | End: 2019-06-10

## 2019-06-10 RX ORDER — ACETAMINOPHEN 650 MG/1
650 SUPPOSITORY RECTAL
Status: DISCONTINUED | OUTPATIENT
Start: 2019-06-10 | End: 2019-06-11

## 2019-06-10 RX ORDER — SODIUM CHLORIDE 0.9 % (FLUSH) 0.9 %
5-40 SYRINGE (ML) INJECTION EVERY 8 HOURS
Status: DISCONTINUED | OUTPATIENT
Start: 2019-06-10 | End: 2019-06-15 | Stop reason: HOSPADM

## 2019-06-10 RX ORDER — POTASSIUM CHLORIDE 7.45 MG/ML
10 INJECTION INTRAVENOUS
Status: COMPLETED | OUTPATIENT
Start: 2019-06-10 | End: 2019-06-11

## 2019-06-10 RX ORDER — LOSARTAN POTASSIUM AND HYDROCHLOROTHIAZIDE 25; 100 MG/1; MG/1
1 TABLET ORAL DAILY
COMMUNITY
End: 2019-11-01 | Stop reason: CLARIF

## 2019-06-10 RX ORDER — BRIMONIDINE TARTRATE 2 MG/ML
1 SOLUTION/ DROPS OPHTHALMIC 2 TIMES DAILY
Status: DISCONTINUED | OUTPATIENT
Start: 2019-06-10 | End: 2019-06-15 | Stop reason: HOSPADM

## 2019-06-10 RX ADMIN — SODIUM CHLORIDE 1000 ML: 900 INJECTION, SOLUTION INTRAVENOUS at 17:09

## 2019-06-10 RX ADMIN — WATER: 1000 INJECTION, SOLUTION INTRAVENOUS at 20:22

## 2019-06-10 RX ADMIN — POTASSIUM CHLORIDE 10 MEQ: 10 INJECTION, SOLUTION INTRAVENOUS at 22:13

## 2019-06-10 RX ADMIN — SODIUM CHLORIDE 27 UNITS/HR: 900 INJECTION, SOLUTION INTRAVENOUS at 15:24

## 2019-06-10 RX ADMIN — SODIUM BICARBONATE 50 MEQ: 84 INJECTION INTRAVENOUS at 11:53

## 2019-06-10 RX ADMIN — POTASSIUM CHLORIDE 10 MEQ: 10 INJECTION, SOLUTION INTRAVENOUS at 19:41

## 2019-06-10 RX ADMIN — POTASSIUM CHLORIDE 10 MEQ: 10 INJECTION, SOLUTION INTRAVENOUS at 20:45

## 2019-06-10 RX ADMIN — SODIUM CHLORIDE 1000 ML: 900 INJECTION, SOLUTION INTRAVENOUS at 11:53

## 2019-06-10 RX ADMIN — POTASSIUM CHLORIDE 10 MEQ: 10 INJECTION, SOLUTION INTRAVENOUS at 23:12

## 2019-06-10 RX ADMIN — SODIUM CHLORIDE 10.8 UNITS/HR: 900 INJECTION, SOLUTION INTRAVENOUS at 11:29

## 2019-06-10 RX ADMIN — BRIMONIDINE TARTRATE 1 DROP: 2 SOLUTION OPHTHALMIC at 21:15

## 2019-06-10 RX ADMIN — SODIUM CHLORIDE 36.5 UNITS/HR: 900 INJECTION, SOLUTION INTRAVENOUS at 19:11

## 2019-06-10 RX ADMIN — Medication 10 ML: at 20:35

## 2019-06-10 RX ADMIN — CALCIUM GLUCONATE 1 G: 98 INJECTION, SOLUTION INTRAVENOUS at 20:22

## 2019-06-10 RX ADMIN — SODIUM CHLORIDE 1000 ML: 900 INJECTION, SOLUTION INTRAVENOUS at 10:34

## 2019-06-10 RX ADMIN — WATER: 1000 INJECTION, SOLUTION INTRAVENOUS at 12:47

## 2019-06-10 RX ADMIN — SODIUM CHLORIDE 31.6 UNITS/HR: 900 INJECTION, SOLUTION INTRAVENOUS at 21:22

## 2019-06-10 RX ADMIN — ASPIRIN 300 MG: 600 SUPPOSITORY RECTAL at 21:15

## 2019-06-10 NOTE — PROGRESS NOTES
[facility]  Pulmonary, Critical Care, and Sleep Medicine    Name: Onesimo Raymond MRN: [id number]   : 1940 Hospital: [facility]   Date: 6/10/2019        Critical Care Initial Patient Consult    PCP: Sunny Fuentes    IMPRESSION:   · DKA, HX of DM. Has prior issues with low blood sugars. · Encephalopathy, was brought in by EMS. · Lactic Acidosis  · Leukocytosis  · Mild Anemia  · Dehydration  · Hypothyroid  · Critically ill, over 35 min CC, EOP. High risk of decompensation, High risk multiple organ failure, Needs closer observation and ICU Monitoring. RECOMMENDATIONS:   · Serial labs  · Insulin Drip  · Hydration  · Monitor Mental status     Subjective/History: This patient has been seen and evaluated at the request of Dr. Kev Lyles for above. Patient is a 69 y.o. female who presents for above. She was brought in by EMS for evaluation of above. Has been having increase lethargy. No hx is obtainable from pt. NO ROS or HPI are available. Reviewed labs, Clinical Notes. The patient is critically ill and can not provide additional history due to Unable to speak. Past Medical History:   Diagnosis Date    Diabetes ([address]     Heart failure ([address]     unknown to family    Hypercholesteremia     Hypertension     St[address] ([facility]     Thyroid disease       Past Surgical History:   Procedure Laterality Date    HX GYN      HX HEENT      thyroidectomy    HX HYSTERECTOMY      REMOVAL GALLBLADDER      THYROIDECTOMY        Prior to Admission medications    Medication Sig Start Date End Date Taking? Authorizing Provider   BD ULTRA-FINE SHORT PEN NEEDLE 31 gauge x /16\" ndle USE AS DIRECTED TWICE A DAY. DX.E11.9 19   Hiren Ruiz MD   fenofibrate nanocrystallized (TRICOR) 145 mg tablet TAKE 1 TABLET BY MOUTH EVERY DAY 3/26/19   Chris Kenny MD   flash glucose scanning reader (DeemSTYLE KEVON 14 DAY READER) [facility] [place] to check blood sugar for 14 days. Dx.e11.9 1/22/19   Clayton Cano MD   insulin aspart U-100 (NOVOLOG) 100 unit/mL inpn 2 units AC breakfast,lunch, and dinner  Patient taking differently: 2 Units by SubCUTAneous route three (3) times daily (with meals). 2 units AC breakfast,lunch, and dinner 11/12/18   Clayton Cano MD   insulin degludec (TRESIBA FLEXTOUCH U-100) 100 unit/mL (3 mL) inpn 20 Units by SubCUTAneous route daily. 14 units every morning 11/12/18   Clayton Cano MD   pravastatin (PRAVACHOL) 80 mg tablet Take 1 Tab by mouth daily. 10/26/18   Clayton Cano MD   acetaminophen (TYLENOL) 500 mg tablet Take 500 mg by mouth every six (6) hours as needed for Pain. Provider, Historical   levothyroxine (SYNTHROID) 150 mcg tablet TAKE 1 TABLET BY MOUTH EVERY DAY 10/21/18   Clayton Cano MD   amLODIPine (NORVASC) 10 mg tablet TAKE 1 TABLET BY MOUTH EVERY MORNING 10/21/18   Clayton Cano MD   clopidogrel (PLAVIX) 75 mg tab TAKE 1 TAB BY MOUTH DAILY. 10/21/18   Clayton Cano MD   brimonidine (ALPHAGAN) 0.15 % ophthalmic solution Administer 1 Drop to both eyes two (2) times a day. 1/30/15   Provider, Historical     Current Facility-Administered Medications   Medication Dose Route Frequency    insulin regular (NOVOLIN R, HUMULIN R) 100 Units in 0.9% sodium chloride 100 mL infusion  0-50 Units/hr IntraVENous TITRATE    insulin lispro (HUMALOG) injection   SubCUTAneous TIDAC    sodium chloride (NS) flush 5-40 mL  5-40 mL IntraVENous Q8H    sodium bicarbonate (8.4%) 150 mEq in sterile water 1,000 mL infusion   IntraVENous CONTINUOUS     No Known Allergies   Social History     Tobacco Use    Smoking status: Never Smoker    Smokeless tobacco: Never Used   Substance Use Topics    Alcohol use: No     Comment: been years      Family History   Problem Relation Age of Onset    Diabetes Father     No Known Problems Mother         Review of Systems:  Review of systems not obtained due to patient factors.     Objective:   Vital Signs: Visit Vitals  BP (!) 108/34   Pulse (!) 113   Temp 98.9 °F (37.2 °C)   Resp 28   Ht 5' 5\" (1.651 m)   Wt 81.6 kg (180 lb)   SpO2 100%   BMI 29.95 kg/m²       O2 Device: Room air       Temp (24hrs), Av.8 °F (37.1 °C), Min:98.6 °F (37 °C), Max:98.9 °F (37.2 °C)       Intake/Output:   Last shift:      06/10 0701 - 06/10 1900  In:  [I.V.:]  Out: -   Last 3 shifts: No intake/output data recorded. Intake/Output Summary (Last 24 hours) at 6/10/2019 1446  Last data filed at 6/10/2019 1247  Gross per 24 hour   Intake 2000 ml   Output    Net 2000 ml     Hemodynamics:   PAP:   CO:     Wedge:   CI:     CVP:    SVR:       PVR:       Ventilator Settings:  Mode Rate Tidal Volume Pressure FiO2 PEEP                    Peak airway pressure:      Minute ventilation:        Physical Exam:    General:  Sleepy, not really cooperative. no distress, appears stated age. Head:  Normocephalic, without obvious abnormality, atraumatic. Eyes:  Conjunctivae/corneas clear. PERRL, EOMs intact. Nose: Nares normal. Septum midline. Mucosa normal. No drainage or sinus tenderness. Throat: Lips, mucosa, and tongue normal. Teeth and gums normal.   Neck: Supple, symmetrical, trachea midline, no adenopathy, thyroid: no enlargment/tenderness/nodules, no carotid bruit and no JVD. Back:   Symmetric, no curvature. ROM normal.   Lungs:   Clear to auscultation bilaterally. Pretty clear anteriorly. Chest wall:  No tenderness or deformity. Heart:  Tachycardic  rate and rhythm, S1, S2 normal, no murmur, click, rub or gallop. Abdomen:   Soft, non-tender. Bowel sounds normal. No masses,  No organomegaly. Extremities: Extremities normal, atraumatic, no cyanosis or edema. Pulses: 2+ and symmetric all extremities.    Skin: Skin color, texture, turgor normal. No rashes or lesions   Lymph nodes: Cervical, supraclavicular, and axillary nodes normal.   Neurologic: Grossly nonfocal, not really able to fully evaluate due to her mental state.  Psych: Not able to fully assess. NO overt anxiety or depression. Data:     Recent Results (from the past 24 hour(s))   GLUCOSE, POC    Collection Time: 06/10/19 10:04 AM   Result Value Ref Range    Glucose (POC) >600 (HH) 65 - 100 mg/dL    Performed by Consuelo HERNANDEZ [address], POC    Collection Time: 06/10/19 10:06 AM   Result Value Ref Range    Glucose (POC) >600 (HH) 65 - 100 mg/dL    Performed by Yuliya Abrams    CBC WITH AUTOMATED DIFF    Collection Time: 06/10/19 10:30 AM   Result Value Ref Range    WBC 16.7 (H) 3.6 - 11.0 K/uL    RBC 3.87 3.80 - 5.20 M/uL    HGB 12.4 11.5 - 16.0 g/dL    HCT 40.0 35.0 - 47.0 %    .4 (H) 80.0 - 99.0 FL    MCH 32.0 26.0 - 34.0 PG    MCHC 31.0 30.0 - 36.5 g/dL    RDW 13.4 11.5 - 14.5 %    PLATELET 314 488 - 432 K/uL    MPV 10.8 8.9 - 12.9 FL    NRBC 0.0 0  WBC    ABSOLUTE NRBC 0.00 0.00 - 0.01 K/uL    NEUTROPHILS 75 32 - 75 %    LYMPHOCYTES 10 (L) 12 - 49 %    MONOCYTES 10 5 - 13 %    EOSINOPHILS 0 0 - 7 %    BASOPHILS 1 0 - 1 %    IMMATURE GRANULOCYTES 4 (H) 0.0 - 0.5 %    ABS. NEUTROPHILS 12.4 (H) 1.8 - 8.0 K/UL    ABS. LYMPHOCYTES 1.7 0.8 - 3.5 K/UL    ABS. MONOCYTES 1.7 (H) 0.0 - 1.0 K/UL    ABS. EOSINOPHILS 0.0 0.0 - 0.4 K/UL    ABS. BASOPHILS 0.2 (H) 0.0 - 0.1 K/UL    ABS. IMM.  GRANS. 0.7 (H) 0.00 - 0.04 K/UL    DF AUTOMATED      RBC COMMENTS NORMOCYTIC, NORMOCHROMIC     METABOLIC PANEL, COMPREHENSIVE    Collection Time: 06/10/19 10:30 AM   Result Value Ref Range    Sodium 127 (L) 136 - 145 mmol/L    Potassium 5.9 (H) 3.5 - 5.1 mmol/L    Chloride 93 (L) 97 - 108 mmol/L    CO2 <5 (LL) 21 - 32 mmol/L    Anion gap Cannot be calculated 5 - 15 mmol/L    Glucose 904 (HH) 65 - 100 mg/dL    BUN 34 (H) 6 - 20 MG/DL    Creatinine 1.76 (H) 0.55 - 1.02 MG/DL    BUN/Creatinine ratio 19 12 - 20      GFR est AA 34 (L) >60 ml/min/1.73m2    GFR est non-AA 28 (L) >60 ml/min/1.73m2    Calcium 8.9 8.5 - 10.1 MG/DL    Bilirubin, total 0.5 0.2 - 1.0 MG/DL    ALT (SGPT) 32 12 - 78 U/L    AST (SGOT) 33 15 - 37 U/L    Alk. phosphatase 153 (H) 45 - 117 U/L    Protein, total 6.9 6.4 - 8.2 g/dL    Albumin 3.5 3.5 - 5.0 g/dL    Globulin 3.4 2.0 - 4.0 g/dL    A-G Ratio 1.0 (L) 1.1 - 2.2     SAMPLES BEING HELD    Collection Time: 06/10/19 10:30 AM   Result Value Ref Range    SAMPLES BEING HELD green,blue     COMMENT        Add-on orders for these samples will be processed based on acceptable specimen integrity and analyte stability, which may vary by analyte. LACTIC ACID    Collection Time: 06/10/19 10:30 AM   Result Value Ref Range    Lactic acid 7.8 (HH) 0.4 - 2.0 MMOL/L   URINALYSIS W/ RFLX MICROSCOPIC    Collection Time: 06/10/19 11:18 AM   Result Value Ref Range    Color YELLOW/STRAW      Appearance CLEAR CLEAR      Specific gravity 1.022 1.003 - 1.030      pH (UA) 5.0 5.0 - 8.0      Protein 30 (A) NEG mg/dL    Glucose >1,000 (A) NEG mg/dL    Ketone 80 (A) NEG mg/dL    Bilirubin NEGATIVE  NEG      Blood TRACE (A) NEG      Urobilinogen 0.2 0.2 - 1.0 EU/dL    Nitrites NEGATIVE  NEG      Leukocyte Esterase NEGATIVE  NEG      WBC 0-4 0 - 4 /hpf    RBC 0-5 0 - 5 /hpf    Epithelial cells FEW FEW /lpf    Bacteria NEGATIVE  NEG /hpf    Amorphous Crystals FEW (A) NEG     EKG, 12 LEAD, INITIAL    Collection Time: 06/10/19 12:03 PM   Result Value Ref Range    Ventricular Rate 121 BPM    Atrial Rate 121 BPM    P-R Interval 160 ms    QRS Duration 104 ms    Q-T Interval 358 ms    QTC Calculation (Bezet) 508 ms    Calculated P Axis 47 degrees    Calculated R Axis -48 degrees    Calculated T Axis 80 degrees    Diagnosis       Sinus tachycardia with occasional premature ventricular complexes  Left axis deviation  Inferior infarct , age undetermined  Possible Anterior infarct (cited on or before 19-OCT-2018)  When compared with ECG of 19-OCT-2018 21:08,  premature ventricular complexes are now present  Vent.  rate has decreased BY  94 BPM  Right bundle branch block is no longer present  Questionable change in initial forces of Anterior leads     GLUCOSE, POC    Collection Time: 06/10/19 12:27 PM   Result Value Ref Range    Glucose (POC) >600 (HH) 65 - 100 mg/dL    Performed by [id number], POC    Collection Time: 06/10/19 12:28 PM   Result Value Ref Range    Glucose (POC) >600 (HH) 65 - 100 mg/dL    Performed by [id number], POC    Collection Time: 06/10/19  1:33 PM   Result Value Ref Range    Glucose (POC) >600 (HH) 65 - 100 mg/dL    Performed by Yisel Chaudhari [place] ACID    Collection Time: 06/10/19  1:52 PM   Result Value Ref Range    Lactic acid 8.9 (HH) 0.4 - 2.0 MMOL/L   MAGNESIUM    Collection Time: 06/10/19  1:52 PM   Result Value Ref Range    Magnesium 2.5 (H) 1.6 - 2.4 mg/dL   METABOLIC PANEL, BASIC    Collection Time: 06/10/19  1:52 PM   Result Value Ref Range    Sodium 136 136 - 145 mmol/L    Potassium 4.4 3.5 - 5.1 mmol/L    Chloride 101 97 - 108 mmol/L    CO2 <5 (LL) 21 - 32 mmol/L    Anion gap Cannot be calculated 5 - 15 mmol/L    Glucose 766 (HH) 65 - 100 mg/dL    BUN 36 (H) 6 - 20 MG/DL    Creatinine 1.83 (H) 0.55 - 1.02 MG/DL    BUN/Creatinine ratio 20 12 - 20      GFR est AA 32 (L) >60 ml/min/1.73m2    GFR est non-AA 27 (L) >60 ml/min/1.73m2    Calcium 7.9 (L) 8.5 - 10.1 MG/DL   PHOSPHORUS    Collection Time: 06/10/19  1:52 PM   Result Value Ref Range    Phosphorus 8.5 (H) 2.6 - 4.7 MG/DL   GLUCOSE, POC    Collection Time: 06/10/19  2:37 PM   Result Value Ref Range    Glucose (POC) >600 (HH) 65 - 100 mg/dL    Performed by LISSETH KIMBLE ([facility])    GLUCOSE, POC    Collection Time: 06/10/19  2:39 PM   Result Value Ref Range    Glucose (POC) >600 (HH) 65 - 100 mg/dL    Performed by Gabriel Cotton (PCT)              Telemetry:  EKG: Sinus tachycardia with occasional premature ventricular complexes   Left axis deviation   Inferior infarct , age undetermined   Possible Anterior infarct (cited on or before 19-OCT-2018)   When compared with ECG of 19-OCT-2018 21:08,   premature ventricular complexes are now present   Vent. rate has decreased BY  94 BPM   Right bundle branch block is no longer present   Questionable change in initial forces of Anterior leads     Imaging:  I have personally reviewed the patients radiographs and have reviewed the reports:  6-10-19: Head CT scan: FINDINGS:  The ventricles and sulci are normal in size, shape and configuration and  midline. There is no significant white matter disease. There is no intracranial  hemorrhage, extra-axial collection, mass, mass effect or midline shift. The  basilar cisterns are open. No acute infarct is identified. The bone windows  demonstrate no abnormalities.  The visualized portions of the paranasal sinuses  and mastoid air cells are clear.          Carmelina Clark MD

## 2019-06-10 NOTE — PROGRESS NOTES
TRANSFER - IN REPORT:    Verbal report received from VIN Youngblood on Kate Styles  being received from ED [facility] for routine progression of care      Report consisted of patients Situation, Background, Assessment and   Recommendations(SBAR). Information from the following report(s) SBAR, Intake/Output, MAR, Recent Results and Cardiac Rhythm NSR was reviewed with the receiving nurse. Opportunity for questions and clarification was provided. Assessment completed upon patients arrival to unit and care assumed.

## 2019-06-10 NOTE — PROGRESS NOTES
Lactic rising to 8.9. CT abdomen ordered  IVF bolus 1L ordered  Increase bicarb drip to 150ml/hr  Check cardiac enzymes.     Naa Thomason MD

## 2019-06-10 NOTE — PROGRESS NOTES
Pharmacy Clarification of the Prior to Admission Medication Regimen Retrospective to the Admission Medication Reconciliation-Follow up Needed    The patient was not interviewed regarding clarification of the prior to admission medication regimen. Patient obtunded. T called the patient's daughter, Radha Monroy, [phone], and left a voicemail. MHT called the patient's son, Carlos Gist [phone], who was able to read MHT the patient's prescription bottles. MHT called the patient's outpatient pharmacy, [address] [place] [address], [phone], and spoke with Christina Pena pharmacist, who was able to confirm what the patient's son told T. PTA med list was updated based on the information receive by the patient's son and patient's outpatient pharmacy. Compliance and last doses administered are unknown at this time. Information Obtained From: Patient's son, outpatient pharmacy, RX Query    Recommendations/Findings: The following amendments were made to the patient's active medication list on file at [facility]:     1) Additions:   losartan-hydroCHLOROthiazide (HYZAAR) 100-25 mg per tablet    2) Removals:   Fenofibrate (Per patient's son, the patient did not have this bottle at home. Per outpatient pharmacy, this agent has not been picked up as of December 2018.)     3) Changes: NONE    4) Pertinent Pharmacy Findings: The medication history will need to be re-evaluated at a later time during admission when patient is willing/able to participate or if more information is provided. PTA medication list was corrected to the following:     Prior to Admission Medications   Prescriptions Last Dose Informant Patient Reported? Taking?   acetaminophen (TYLENOL) 500 mg tablet Unknown at Unknown time Child Yes No   Sig: Take 500 mg by mouth every six (6) hours as needed for Pain.    amLODIPine (NORVASC) 10 mg tablet Unknown at Unknown time Other No No   Sig: TAKE 1 TABLET BY MOUTH EVERY MORNING   brimonidine (ALPHAGAN) 0.15 % ophthalmic solution Unknown at Unknown time Other Yes No   Sig: Administer 1 Drop to both eyes two (2) times a day. clopidogrel (PLAVIX) 75 mg tab Unknown at Unknown time Other No No   Sig: TAKE 1 TAB BY MOUTH DAILY. insulin aspart U-100 (NOVOLOG) 100 unit/mL inpn Unknown at Unknown time Other No No   Si units AC breakfast,lunch, and dinner   insulin degludec (TRESIBA FLEXTOUCH U-100) 100 unit/mL (3 mL) inpn Unknown at Unknown time Other Yes No   Si Units by SubCUTAneous route daily. levothyroxine (SYNTHROID) 150 mcg tablet Unknown at Unknown time Other No No   Sig: TAKE 1 TABLET BY MOUTH EVERY DAY   losartan-hydroCHLOROthiazide (HYZAAR) 100-25 mg per tablet Unknown at Unknown time Other Yes No   Sig: Take 1 Tab by mouth daily. pravastatin (PRAVACHOL) 80 mg tablet Unknown at Unknown time Other No No   Sig: Take 1 Tab by mouth daily.       Facility-Administered Medications: None          Thank you,  Kenneth Velazquez CPhT  Medication History Pharmacy Technician

## 2019-06-10 NOTE — ED PROVIDER NOTES
EMERGENCY DEPARTMENT HISTORY AND PHYSICAL EXAM      Date: 6/10/2019  Patient Name: Avery Painter    History of Presenting Illness     Chief Complaint   Patient presents with    High Blood Sugar     last took insulin yesterday. BG in 500s en route per EMS. History Provided By: EMS    HPI: Avery Painter, 69 y.o. female  presents to the ED with cc of hyperglycemia and altered mental status. Patient is a diabetic and has not had any insulin since yesterday. EMS brings her in with altered mental status. She is normally awake and oriented x3. We have seen her on multiple occasions with low blood sugars however today her blood sugar is reading high. She is not able to give me further history such as if she is been having any fevers or chills. Unknown if she is been vomiting or had any diarrhea. Her son is with her today but cannot provide history. There are no other complaints, changes, or physical findings at this time. PCP: Sydni Daniel MD    No current facility-administered medications on file prior to encounter. Current Outpatient Medications on File Prior to Encounter   Medication Sig Dispense Refill    BD ULTRA-FINE SHORT PEN NEEDLE 31 gauge x 5/16\" ndle USE AS DIRECTED TWICE A DAY. DX.E11.9 60 Pen Needle 11    fenofibrate nanocrystallized (TRICOR) 145 mg tablet TAKE 1 TABLET BY MOUTH EVERY DAY 30 Tab 11    flash glucose scanning reader (FREESTYLE KEVON 14 DAY READER) [facility] Use to check blood sugar for 14 days. Dx.e11.9 2 Each 11    insulin aspart U-100 (NOVOLOG) 100 unit/mL inpn 2 units AC breakfast,lunch, and dinner (Patient taking differently: 2 Units by SubCUTAneous route three (3) times daily (with meals). 2 units AC breakfast,lunch, and dinner) 1 Adjustable Dose Pre-filled Pen Syringe 11    insulin degludec (TRESIBA FLEXTOUCH U-100) 100 unit/mL (3 mL) inpn 20 Units by SubCUTAneous route daily.  14 units every morning 1 Adjustable Dose Pre-filled Pen Syringe 11    pravastatin (PRAVACHOL) 80 mg tablet Take 1 Tab by mouth daily. 90 Tab 3    acetaminophen (TYLENOL) 500 mg tablet Take 500 mg by mouth every six (6) hours as needed for Pain.  levothyroxine (SYNTHROID) 150 mcg tablet TAKE 1 TABLET BY MOUTH EVERY DAY 30 Tab 11    amLODIPine (NORVASC) 10 mg tablet TAKE 1 TABLET BY MOUTH EVERY MORNING 90 Tab 3    clopidogrel (PLAVIX) 75 mg tab TAKE 1 TAB BY MOUTH DAILY. 30 Tab 11    brimonidine (ALPHAGAN) 0.15 % ophthalmic solution Administer 1 Drop to both eyes two (2) times a day. Past History     Past Medical History:  Past Medical History:   Diagnosis Date    Diabetes ([address]     Heart failure ([facility] [facility]     unknown to family    Hypercholesteremia     Hypertension     Stroke ([facility] [facility]     Thyroid disease        Past Surgical History:  Past Surgical History:   Procedure Laterality Date    HX GYN      HX HEENT      thyroidectomy    HX HYSTERECTOMY      REMOVAL GALLBLADDER      THYROIDECTOMY         Family History:  Family History   Problem Relation Age of Onset    Diabetes Father     No Known Problems Mother        Social History:  Social History     Tobacco Use    Smoking status: Never Smoker    Smokeless tobacco: Never Used   Substance Use Topics    Alcohol use: No     Comment: been years    Drug use: No       Allergies:  No Known Allergies      Review of Systems   Review of Systems   Unable to perform ROS: Mental status change       Physical Exam   Physical Exam   Constitutional: She appears well-developed and well-nourished. She appears lethargic. She appears ill. HENT:   Mouth/Throat: Oropharynx is clear and moist.   Eyes: Conjunctivae are normal.   Neck: Neck supple. Cardiovascular: Normal rate and regular rhythm. Pulmonary/Chest: Effort normal and breath sounds normal. No accessory muscle usage. Tachypnea noted. No respiratory distress. Abdominal: Soft. She exhibits no distension. There is no tenderness. Lymphadenopathy:     She has no cervical adenopathy. Neurological: She has normal strength. She appears lethargic. No cranial nerve deficit or sensory deficit. Skin: Skin is warm and dry. Nursing note and vitals reviewed. Diagnostic Study Results     Labs -     Recent Results (from the past 24 hour(s))   GLUCOSE, POC    Collection Time: 06/10/19 10:04 AM   Result Value Ref Range    Glucose (POC) >600 (HH) 65 - 100 mg/dL    Performed by Consuelo HERNANDEZ [address] [place] [address], POC    Collection Time: 06/10/19 10:06 AM   Result Value Ref Range    Glucose (POC) >600 (HH) 65 - 100 mg/dL    Performed by Jesse Osborn    CBC WITH AUTOMATED DIFF    Collection Time: 06/10/19 10:30 AM   Result Value Ref Range    WBC 16.7 (H) 3.6 - 11.0 K/uL    RBC 3.87 3.80 - 5.20 M/uL    HGB 12.4 11.5 - 16.0 g/dL    HCT 40.0 35.0 - 47.0 %    .4 (H) 80.0 - 99.0 FL    MCH 32.0 26.0 - 34.0 PG    MCHC 31.0 30.0 - 36.5 g/dL    RDW 13.4 11.5 - 14.5 %    PLATELET 428 115 - 186 K/uL    MPV 10.8 8.9 - 12.9 FL    NRBC 0.0 0  WBC    ABSOLUTE NRBC 0.00 0.00 - 0.01 K/uL    NEUTROPHILS 75 32 - 75 %    LYMPHOCYTES 10 (L) 12 - 49 %    MONOCYTES 10 5 - 13 %    EOSINOPHILS 0 0 - 7 %    BASOPHILS 1 0 - 1 %    IMMATURE GRANULOCYTES 4 (H) 0.0 - 0.5 %    ABS. NEUTROPHILS 12.4 (H) 1.8 - 8.0 K/UL    ABS. LYMPHOCYTES 1.7 0.8 - 3.5 K/UL    ABS. MONOCYTES 1.7 (H) 0.0 - 1.0 K/UL    ABS. EOSINOPHILS 0.0 0.0 - 0.4 K/UL    ABS. BASOPHILS 0.2 (H) 0.0 - 0.1 K/UL    ABS. IMM.  GRANS. 0.7 (H) 0.00 - 0.04 K/UL    DF AUTOMATED      RBC COMMENTS NORMOCYTIC, NORMOCHROMIC     METABOLIC PANEL, COMPREHENSIVE    Collection Time: 06/10/19 10:30 AM   Result Value Ref Range    Sodium 127 (L) 136 - 145 mmol/L    Potassium 5.9 (H) 3.5 - 5.1 mmol/L    Chloride 93 (L) 97 - 108 mmol/L    CO2 <5 (LL) 21 - 32 mmol/L    Anion gap Cannot be calculated 5 - 15 mmol/L    Glucose 904 (HH) 65 - 100 mg/dL    BUN 34 (H) 6 - 20 MG/DL    Creatinine 1.76 (H) 0.55 - 1.02 MG/DL    BUN/Creatinine ratio 19 12 - 20      GFR est AA 34 (L) >60 ml/min/1.73m2 GFR est non-AA 28 (L) >60 ml/min/1.73m2    Calcium 8.9 8.5 - 10.1 MG/DL    Bilirubin, total 0.5 0.2 - 1.0 MG/DL    ALT (SGPT) 32 12 - 78 U/L    AST (SGOT) 33 15 - 37 U/L    Alk. phosphatase 153 (H) 45 - 117 U/L    Protein, total 6.9 6.4 - 8.2 g/dL    Albumin 3.5 3.5 - 5.0 g/dL    Globulin 3.4 2.0 - 4.0 g/dL    A-G Ratio 1.0 (L) 1.1 - 2.2     SAMPLES BEING HELD    Collection Time: 06/10/19 10:30 AM   Result Value Ref Range    SAMPLES BEING HELD green,blue     COMMENT        Add-on orders for these samples will be processed based on acceptable specimen integrity and analyte stability, which may vary by analyte.    LACTIC ACID    Collection Time: 06/10/19 10:30 AM   Result Value Ref Range    Lactic acid 7.8 (HH) 0.4 - 2.0 MMOL/L   URINALYSIS W/ RFLX MICROSCOPIC    Collection Time: 06/10/19 11:18 AM   Result Value Ref Range    Color YELLOW/STRAW      Appearance CLEAR CLEAR      Specific gravity 1.022 1.003 - 1.030      pH (UA) 5.0 5.0 - 8.0      Protein 30 (A) NEG mg/dL    Glucose >1,000 (A) NEG mg/dL    Ketone 80 (A) NEG mg/dL    Bilirubin NEGATIVE  NEG      Blood TRACE (A) NEG      Urobilinogen 0.2 0.2 - 1.0 EU/dL    Nitrites NEGATIVE  NEG      Leukocyte Esterase NEGATIVE  NEG      WBC 0-4 0 - 4 /hpf    RBC 0-5 0 - 5 /hpf    Epithelial cells FEW FEW /lpf    Bacteria NEGATIVE  NEG /hpf    Amorphous Crystals FEW (A) NEG     EKG, 12 LEAD, INITIAL    Collection Time: 06/10/19 12:03 PM   Result Value Ref Range    Ventricular Rate 121 BPM    Atrial Rate 121 BPM    P-R Interval 160 ms    QRS Duration 104 ms    Q-T Interval 358 ms    QTC Calculation (Bezet) 508 ms    Calculated P Axis 47 degrees    Calculated R Axis -48 degrees    Calculated T Axis 80 degrees    Diagnosis       Sinus tachycardia with occasional premature ventricular complexes  Left axis deviation  Inferior infarct , age undetermined  Possible Anterior infarct (cited on or before 19-OCT-2018)  When compared with ECG of 19-OCT-2018 21:08,  premature ventricular complexes are now present  Vent. rate has decreased BY  94 BPM  Right bundle branch block is no longer present  Questionable change in initial forces of Anterior leads         Radiologic Studies -   XR CHEST PORT    (Results Pending)   CT HEAD WO CONT    (Results Pending)     CT Results  (Last 48 hours)    None        CXR Results  (Last 48 hours)    None            Medical Decision Making   I am the first provider for this patient. I reviewed the vital signs, available nursing notes, past medical history, past surgical history, family history and social history. Vital Signs-Reviewed the patient's vital signs. Patient Vitals for the past 24 hrs:   Temp Pulse Resp BP SpO2   06/10/19 1130  (!) 116 28 (!) 126/34 100 %   06/10/19 1101  (!) 120 30 (!) 132/38 100 %   06/10/19 1045  (!) 124 28 134/41 100 %   06/10/19 1031 98.9 °F (37.2 °C)   (!) 141/39 100 %   06/10/19 1008 98.6 °F (37 °C) (!) 128 30 149/47 100 %       Pulse Oximetry Analysis - 100% on RA    Cardiac Monitor:   Rate: 128 bpm  Rhythm: Sinus Tachycardia      EKG interpretation: (Preliminary)  Rhythm: Sinus tachycardia; and regular . Rate (approx.): 121; Axis: left axis deviation; MO interval: normal; QRS interval: normal ; ST/T wave: non-specific changes. Records Reviewed: Nursing Notes, Old Medical Records and Ambulance Run Sheet    Provider Notes (Medical Decision Making): This patient with a history of diabetes presents with altered mental status. Prehospital check of her blood sugar was over 500. Metabolic panel here in the ER shows a glucose of 904. She is acidotic with a CO2 of less than 5. She is tachypneic and blowing off CO2 as expected. She is maintaining her airway with a normal gag reflex. She initially she was responding to voice stimuli however progressed to only painful stimuli. She is getting IV fluids and resuscitation. IV insulin infusion per hospital protocol. Giving 2 A of bicarb due to a pH of less than 6.9.   She does have a lactic acidosis and a leukocytosis. Assessing urine and chest x-ray for infection. This may also just be reactive to her DKA and severe dehydration. ED Course:   Initial assessment performed. The patients presenting problems have been discussed, and they are in agreement with the care plan formulated and outlined with them. I have encouraged them to ask questions as they arise throughout their visit. Orders Placed This Encounter    XR CHEST PORT    CT HEAD WO CONT    CBC WITH AUTOMATED DIFF    METABOLIC PANEL, COMPREHENSIVE    Hold Sample    VENOUS BLOOD GAS    LACTIC ACID    URINALYSIS W/ RFLX MICROSCOPIC    GLUCOSE, RANDOM    METABOLIC PANEL, BASIC    MAGNESIUM    PHOSPHORUS    LACTIC ACID    DIET NPO    STRAIGHT CATHETER (NURSING) ONE TIME STAT    NOTIFY PROVIDER: VITAL SIGNS CHANGES    NOTIFY PROVIDER: SPECIFY *Serum pH less than 6.9 to obtain orders for intravenous bicarbonate *Serum potassium less than 3.5 mEq/L or greater than 5.2 mEq/L *Serum magnesium less than 1.5 mEq/L *Serum phosphorus less than 1.5 mEq/L *If insulin dri. [facility] [place] NOTIFY PROVIDER: SPECIFY Notify provider: FOR DKA: When BG is within target range for at least 4 hours AND Anion gap less than 12 mEq/L on two consecutive basic metabolic panels (BMP), for orders to transition off the drip to subcutaneous basal in. ..    NOTIFY PROVIDER: SPECIFY Notify provider: (Only For DKA and HHS Patients) If patient in DKA or HHS when BG is less than or equal to 250 mg/dL, for order to add 5% dextrose to existing fluids.  ONE TIME STAT    NOTIFY PROVIDER: SPECIFY NOTIFY PROVIDER: IF PATIENT IN HHS AND DEVELOPS SEVERE HEADACHE OR ALTERED MENTAL STATUS: NOTIFY PHYSICIAN STAT, RAISE HEAD OF BED 30 DEGREES, AND DECREASE IVF TO 20 ML PER HOUR ONE TIME STAT    POC GLUCOSE - Every 1 hour    POC GLUCOSE    NOTIFY PROVIDER: SPECIFY Notify provider on pt's arrival to floor ONE TIME STAT    [facility] PER UNIT ROUTINE    VITAL SIGNS PER UNIT ROUTINE    BEDREST, COMPLETE    NOTIFY PROVIDER: VITAL SIGNS CHANGES    NOTIFY PROVIDER:  ADMIT STATUS    INTAKE AND OUTPUT    APPLY/MAINTAIN SEQUENTIAL COMPRESSION DEVICE    FULL CODE    OXIMETRY, SPOT CHECK    GLUCOSE, POC    GLUCOSE, POC    EKG, 12 LEAD, INITIAL    sodium chloride 0.9 % bolus infusion 1,000 mL    insulin regular (NOVOLIN R, HUMULIN R) 100 Units in 0.9% sodium chloride 100 mL infusion    insulin lispro (HUMALOG) injection    glucose chewable tablet 16 g    glucagon (GLUCAGEN) injection 1 mg    dextrose 10% infusion 100 mL    sodium bicarbonate 8.4 % (1 mEq/mL) injection 50 mEq    sodium bicarbonate 8.4 % (1 mEq/mL) injection 50 mEq    sodium chloride 0.9 % bolus infusion 1,000 mL    sodium chloride (NS) flush 5-40 mL    sodium chloride (NS) flush 5-40 mL    DISCONTD: 0.9% sodium chloride infusion    acetaminophen (TYLENOL) suppository 650 mg    ondansetron (ZOFRAN) injection 4 mg    sodium bicarbonate (8.4%) 150 mEq in sterile water 1,000 mL infusion    IP CONSULT TO HOSPITALIST    INITIAL PHYSICIAN ORDER: INPATIENT Intensive Care; 3. Patient receiving treatment that can only be provided in an inpatient setting (further clarification in H&P documentation)         Critical Care Time:   I have spent 45 minutes of critical care time in evaluating and treating this patient. This includes time spent at bedside, time with family and decision makers, documentation, review of labs and imaging, and/or consultation with specialists. It does not include time spent on separately billed procedures. This patient presents with a critical illness or injury that acutely impairs one or more vital organ systems such that there is a high probability of imminent or life threatening deterioration in the patient's condition.   This case involved decision making of high complexity to assess, manipulate, and support vital organ system failure and/or to prevent further life threatening deterioration of the patient's condition. Failure to initiate these interventions on an urgent basis would likely result in sudden, clinically significant or life threatening deterioration in the patient's condition. Abnormal findings supporting critical care: Acidosis, DKA  Interventions to support critical care: IV fluid resuscitation, insulin infusion  Failure to intervene may result in: Respiratory failure, cardiac failure, death      Disposition:  Admit      Diagnosis     Clinical Impression:   1. Diabetic ketoacidosis with coma associated with type 2 diabetes mellitus ([facility] [address]    2. Lactic acidosis            This note will not be viewable in MyChart.

## 2019-06-10 NOTE — H&P
Hospitalist Admission Note    NAME: Cristina Benitez   :  1940   MRN:  [id number]     Date/Time:  6/10/2019 12:20 PM    Patient PCP: Jeffrey Goldsmith MD  ______________________________________________________________________   Assessment & Plan:  DM with DKA, POA  --A1c 13.6 in  up from 10.1 in   --multiple admissions recently for DKA but in  was hypoglycemic. In past, there has been concern about patient's ability to manage insulin properly. --IVF, insulin drip. Acute metabolic encephalopathy, POA  --patient currently obtunded. Per EMS, patient had GCS 15 and was oriented x 4. ER nurse reports patient answered few questions but lethargic.    --+ gag but unresponsive to pain  --get CT head    ONEYDA Cr 1.76  Hyperkalemia 5.9  --likely dehydration and metabolic acidosis from dka. --insulin drip, IVF and recheck BMP q4h  --check UA    SIRS with   Severe metabolic acidosis with lactic acidosis 7.8, WBC 16.7, tachycardia, tachypnea  --likely from dka  --no fever. F/u UA, CXR  --check cardiac enzymes  --CT abdomen/pelvis if lactic worsens    Hypothyroid  --change levothyroxine to IV    HTN  --hold losartan-hctz due to oneyda  --hold amlodipine due to lethargy    Hx CVA   --hold plavix due to lethargy; change to rectal aspirin if head CT negative for bleed    Hyperlipidemia  --hold pravachol due to lethargy    Body mass index is 29.95 kg/m². Code: full  DVT prophylaxis:  SCD  Surrogate decision maker:  Son Tai Simon        Subjective:   CHIEF COMPLAINT:  Weakness, high BS    HISTORY OF PRESENT ILLNESS:     Cristina Benitez is a 69 y.o.  female with PMH IDDM, HTN, hyperlipidemia, hx CVA brought in by EMS for generalized weakness, dizziness. EMS reported patient with GCS 15 and alert and oriented x 4.  BS reading was high. In ER, patient reported to be lethargic but answer some questions earlier. Currently obtunded, not withdrawing to pain stimuli.   Open eyes to voice but looks dazed and not making eye contact. Unable to obtain history. Was admitted in 8/18 for hypoglycemia. Since then admitted 10/18 and 11/18 3 more times with DKA. We were asked to admit for work up and evaluation of the above problems. Past Medical History:   Diagnosis Date    Diabetes ([facility]     Heart failure ([facility]     unknown to family    Hypercholesteremia     Hypertension     [facility]     Thyroid disease       Past Surgical History:   Procedure Laterality Date    HX GYN      HX HEENT      thyroidectomy    HX HYSTERECTOMY      REMOVAL GALLBLADDER      THYROIDECTOMY       Social History     Tobacco Use    Smoking status: Never Smoker    Smokeless tobacco: Never Used   Substance Use Topics    Alcohol use: No     Comment: been years       Family History   Problem Relation Age of Onset    Diabetes Father     No Known Problems Mother      No Known Allergies     Prior to Admission medications    Medication Sig Start Date End Date Taking? Authorizing Provider   insulin degludec (TRESIBA FLEXTOUCH U-100) 100 unit/mL (3 mL) inpn 20 Units by SubCUTAneous route daily. Provider, Historical   losartan-hydroCHLOROthiazide (HYZAAR) 100-25 mg per tablet Take 1 Tab by mouth daily. Provider, Historical   insulin aspart U-100 (NOVOLOG) 100 unit/mL inpn 2 units AC breakfast,lunch, and dinner 11/12/18   Cailin Houston MD   pravastatin (PRAVACHOL) 80 mg tablet Take 1 Tab by mouth daily. 10/26/18   Cailin Houston MD   acetaminophen (TYLENOL) 500 mg tablet Take 500 mg by mouth every six (6) hours as needed for Pain. Provider, Historical   levothyroxine (SYNTHROID) 150 mcg tablet TAKE 1 TABLET BY MOUTH EVERY DAY 10/21/18   Cailin Houston MD   amLODIPine (NORVASC) 10 mg tablet TAKE 1 TABLET BY MOUTH EVERY MORNING 10/21/18   Cailin Houston MD   clopidogrel (PLAVIX) 75 mg tab TAKE 1 TAB BY MOUTH DAILY.  10/21/18   Cailin Houston MD   brimonidine (ALPHAGAN) 0.15 % ophthalmic solution Administer 1 Drop to both eyes two (2) times a day. 1/30/15   Provider, Historical     REVIEW OF SYSTEMS:  POSITIVE= Bold. Negative = normal text  Unable to obtain due to mental status      Objective:   VITALS:    Visit Vitals  BP (!) 108/34   Pulse (!) 113   Temp 98.9 °F (37.2 °C)   Resp 28   Ht 5' 5\" (1.651 m)   Wt 81.6 kg (180 lb)   SpO2 100%   BMI 29.95 kg/m²     Temp (24hrs), Av.8 °F (37.1 °C), Min:98.6 °F (37 °C), Max:98.9 °F (37.2 °C)    Body mass index is 29.95 kg/m². PHYSICAL EXAM:    General:    Ill appearing, obtunded well developed female, tachypneic RR 40M due to metabolic acidosis, appears stated age. No family at bedside  HEENT: Atraumatic, anicteric sclerae, pink conjunctivae     No oral ulcers, tongue parched, throat clear. Neck:  Supple, symmetrical,  thyroid: non tender  Lungs:   Clear to auscultation bilaterally. No Wheezing or Rhonchi. No rales. Chest wall:  No tenderness  No Accessory muscle use. Heart:   Regular  rhythm,  Tachycardic, No  murmur   No gallop. No edema. Abdomen:   Soft, non-tender. Not distended. Bowel sounds normal. No masses  Extremities: No cyanosis. No clubbing  Skin:     Not pale Not Jaundiced  No rashes   Psych:  Not depressed. Not anxious or agitated. Neurologic: Pupils equal, obtunded, open eyes to voice.  Nonverbal, not withdrawing to pain.  +gag    IMAGING RESULTS:   []       I have personally reviewed the actual   []     CXR  []     CT scan  CXR:  CT :  EKG: pending   ________________________________________________________________________  Care Plan discussed with:    Comments   Patient     Family      RN y    Care Manager                    Consultant:      ________________________________________________________________________  Prophylaxis:  GI pepcid   DVT SCD   ________________________________________________________________________  Recommended Disposition: ________________________________________________________________________  Code Status:  Full Code y   DNR/DNI    ________________________________________________________________________  TOTAL TIME: 35 minutes critical care      Comments    y Reviewed previous records       ______________________________________________________________________  Ksenia Cedeno MD      Procedures: see electronic medical records for all procedures/Xrays and details which were not copied into this note but were reviewed prior to creation of Plan. LAB DATA REVIEWED:    Recent Results (from the past 24 hour(s))   GLUCOSE, POC    Collection Time: 06/10/19 10:04 AM   Result Value Ref Range    Glucose (POC) >600 (HH) 65 - 100 mg/dL    Performed by [address], POC    Collection Time: 06/10/19 10:06 AM   Result Value Ref Range    Glucose (POC) >600 (HH) 65 - 100 mg/dL    Performed by Sina Royal    CBC WITH AUTOMATED DIFF    Collection Time: 06/10/19 10:30 AM   Result Value Ref Range    WBC 16.7 (H) 3.6 - 11.0 K/uL    RBC 3.87 3.80 - 5.20 M/uL    HGB 12.4 11.5 - 16.0 g/dL    HCT 40.0 35.0 - 47.0 %    .4 (H) 80.0 - 99.0 FL    MCH 32.0 26.0 - 34.0 PG    MCHC 31.0 30.0 - 36.5 g/dL    RDW 13.4 11.5 - 14.5 %    PLATELET 529 792 - 177 K/uL    MPV 10.8 8.9 - 12.9 FL    NRBC 0.0 0  WBC    ABSOLUTE NRBC 0.00 0.00 - 0.01 K/uL    NEUTROPHILS 75 32 - 75 %    LYMPHOCYTES 10 (L) 12 - 49 %    MONOCYTES 10 5 - 13 %    EOSINOPHILS 0 0 - 7 %    BASOPHILS 1 0 - 1 %    IMMATURE GRANULOCYTES 4 (H) 0.0 - 0.5 %    ABS. NEUTROPHILS 12.4 (H) 1.8 - 8.0 K/UL    ABS. LYMPHOCYTES 1.7 0.8 - 3.5 K/UL    ABS. MONOCYTES 1.7 (H) 0.0 - 1.0 K/UL    ABS. EOSINOPHILS 0.0 0.0 - 0.4 K/UL    ABS. BASOPHILS 0.2 (H) 0.0 - 0.1 K/UL    ABS. IMM.  GRANS. 0.7 (H) 0.00 - 0.04 K/UL    DF AUTOMATED      RBC COMMENTS NORMOCYTIC, NORMOCHROMIC     METABOLIC PANEL, COMPREHENSIVE    Collection Time: 06/10/19 10:30 AM   Result Value Ref Range    Sodium 127 (L) 136 - 145 mmol/L    Potassium 5.9 (H) 3.5 - 5.1 mmol/L    Chloride 93 (L) 97 - 108 mmol/L    CO2 <5 (LL) 21 - 32 mmol/L    Anion gap Cannot be calculated 5 - 15 mmol/L    Glucose 904 (HH) 65 - 100 mg/dL    BUN 34 (H) 6 - 20 MG/DL    Creatinine 1.76 (H) 0.55 - 1.02 MG/DL    BUN/Creatinine ratio 19 12 - 20      GFR est AA 34 (L) >60 ml/min/1.73m2    GFR est non-AA 28 (L) >60 ml/min/1.73m2    Calcium 8.9 8.5 - 10.1 MG/DL    Bilirubin, total 0.5 0.2 - 1.0 MG/DL    ALT (SGPT) 32 12 - 78 U/L    AST (SGOT) 33 15 - 37 U/L    Alk. phosphatase 153 (H) 45 - 117 U/L    Protein, total 6.9 6.4 - 8.2 g/dL    Albumin 3.5 3.5 - 5.0 g/dL    Globulin 3.4 2.0 - 4.0 g/dL    A-G Ratio 1.0 (L) 1.1 - 2.2     SAMPLES BEING HELD    Collection Time: 06/10/19 10:30 AM   Result Value Ref Range    SAMPLES BEING HELD green,blue     COMMENT        Add-on orders for these samples will be processed based on acceptable specimen integrity and analyte stability, which may vary by analyte.    LACTIC ACID    Collection Time: 06/10/19 10:30 AM   Result Value Ref Range    Lactic acid 7.8 (HH) 0.4 - 2.0 MMOL/L   URINALYSIS W/ RFLX MICROSCOPIC    Collection Time: 06/10/19 11:18 AM   Result Value Ref Range    Color YELLOW/STRAW      Appearance CLEAR CLEAR      Specific gravity 1.022 1.003 - 1.030      pH (UA) 5.0 5.0 - 8.0      Protein 30 (A) NEG mg/dL    Glucose >1,000 (A) NEG mg/dL    Ketone 80 (A) NEG mg/dL    Bilirubin NEGATIVE  NEG      Blood TRACE (A) NEG      Urobilinogen 0.2 0.2 - 1.0 EU/dL    Nitrites NEGATIVE  NEG      Leukocyte Esterase NEGATIVE  NEG      WBC 0-4 0 - 4 /hpf    RBC 0-5 0 - 5 /hpf    Epithelial cells FEW FEW /lpf    Bacteria NEGATIVE  NEG /hpf    Amorphous Crystals FEW (A) NEG     EKG, 12 LEAD, INITIAL    Collection Time: 06/10/19 12:03 PM   Result Value Ref Range    Ventricular Rate 121 BPM    Atrial Rate 121 BPM    P-R Interval 160 ms    QRS Duration 104 ms    Q-T Interval 358 ms    QTC Calculation (Bezet) 508 ms    Calculated P Axis 47 degrees    Calculated R Axis -48 degrees    Calculated T Axis 80 degrees    Diagnosis       Sinus tachycardia with occasional premature ventricular complexes  Left axis deviation  Inferior infarct , age undetermined  Possible Anterior infarct (cited on or before 19-OCT-2018)  When compared with ECG of 19-OCT-2018 21:08,  premature ventricular complexes are now present  Vent.  rate has decreased BY  94 BPM  Right bundle branch block is no longer present  Questionable change in initial forces of Anterior leads     GLUCOSE, POC    Collection Time: 06/10/19 12:27 PM   Result Value Ref Range    Glucose (POC) >600 (HH) 65 - 100 mg/dL    Performed by [id number], POC    Collection Time: 06/10/19 12:28 PM   Result Value Ref Range    Glucose (POC) >600 (HH) 65 - 100 mg/dL    Performed by [id number], POC    Collection Time: 06/10/19  1:33 PM   Result Value Ref Range    Glucose (POC) >600 (HH) 65 - 100 mg/dL    Performed by [address] ACID    Collection Time: 06/10/19  1:52 PM   Result Value Ref Range    Lactic acid 8.9 (HH) 0.4 - 2.0 MMOL/L   MAGNESIUM    Collection Time: 06/10/19  1:52 PM   Result Value Ref Range    Magnesium 2.5 (H) 1.6 - 2.4 mg/dL   METABOLIC PANEL, BASIC    Collection Time: 06/10/19  1:52 PM   Result Value Ref Range    Sodium 136 136 - 145 mmol/L    Potassium 4.4 3.5 - 5.1 mmol/L    Chloride 101 97 - 108 mmol/L    CO2 <5 (LL) 21 - 32 mmol/L    Anion gap Cannot be calculated 5 - 15 mmol/L    Glucose 766 (HH) 65 - 100 mg/dL    BUN 36 (H) 6 - 20 MG/DL    Creatinine 1.83 (H) 0.55 - 1.02 MG/DL    BUN/Creatinine ratio 20 12 - 20      GFR est AA 32 (L) >60 ml/min/1.73m2    GFR est non-AA 27 (L) >60 ml/min/1.73m2    Calcium 7.9 (L) 8.5 - 10.1 MG/DL   PHOSPHORUS    Collection Time: 06/10/19  1:52 PM   Result Value Ref Range    Phosphorus 8.5 (H) 2.6 - 4.7 MG/DL   GLUCOSE, POC    Collection Time: 06/10/19  2:37 PM   Result Value Ref Range    Glucose (POC) >600 (HH) 65 - 100 mg/dL Performed by Balbir Mack (PCT)    GLUCOSE, POC    Collection Time: 06/10/19  2:39 PM   Result Value Ref Range    Glucose (POC) >600 (HH) 65 - 100 mg/dL    Performed by Balbir Mack (PCT)

## 2019-06-10 NOTE — PROGRESS NOTES
CM went to room to assess patient. Patient asleep, another person sitting in chair bedside. Asked primary nurse if patient could answer questions appropriately. RN stated patient had been confused (BS in 900s on admission) and person in room is just a family friend.   Patient's POA will be in tomorrow    Samantha Hamilton RN, [place] [address] Care Management  [phone]

## 2019-06-10 NOTE — PROGRESS NOTES
Per policy, IV levothyroxine will be held for 7 days. It is unknown when patient last took her levothyroxine. Patient presents in DKA. Adjusted to 7 days from 6/9/19. Please f/up if timing needs adjusted. Also, per protocol, dose should be 75% of home dose. Adjusted dose.

## 2019-06-10 NOTE — ED NOTES
TRANSFER - OUT REPORT:    Verbal report given to Helena(name) on John Landeros  being transferred to CCU(unit) for routine progression of care       Report consisted of patients Situation, Background, Assessment and   Recommendations(SBAR). Information from the following report(s) SBAR, Kardex, ED Summary, [facility] and Recent Results was reviewed with the receiving nurse. Lines:   Peripheral IV 06/10/19 Left;Posterior Hand (Active)   Site Assessment Clean, dry, & intact 6/10/2019 11:44 AM   Phlebitis Assessment 0 6/10/2019 11:44 AM   Infiltration Assessment 0 6/10/2019 11:44 AM   Dressing Status Clean, dry, & intact 6/10/2019 11:44 AM   Hub Color/Line Status Blue 6/10/2019 11:44 AM       Peripheral IV 06/10/19 Right Antecubital (Active)   Site Assessment Clean, dry, & intact 6/10/2019 11:44 AM   Phlebitis Assessment 0 6/10/2019 11:44 AM   Infiltration Assessment 0 6/10/2019 11:44 AM   Dressing Status Clean, dry, & intact 6/10/2019 11:44 AM   Hub Color/Line Status Green 6/10/2019 11:44 AM        Opportunity for questions and clarification was provided.       Patient transported with:   Monitor  Registered Nurse    Brittney Musa RN

## 2019-06-11 ENCOUNTER — APPOINTMENT (OUTPATIENT)
Dept: NON INVASIVE DIAGNOSTICS | Age: 79
DRG: 637 | End: 2019-06-11
Attending: NURSE PRACTITIONER
Payer: MEDICARE

## 2019-06-11 PROBLEM — R77.8 ELEVATED TROPONIN: Status: ACTIVE | Noted: 2019-06-11

## 2019-06-11 LAB
ANION GAP SERPL CALC-SCNC: 5 MMOL/L (ref 5–15)
ANION GAP SERPL CALC-SCNC: 8 MMOL/L (ref 5–15)
APTT PPP: 28.4 SEC (ref 22.1–32)
APTT PPP: 43.5 SEC (ref 22.1–32)
ATRIAL RATE: 89 BPM
BASOPHILS # BLD: 0 K/UL (ref 0–0.1)
BASOPHILS NFR BLD: 0 % (ref 0–1)
BUN SERPL-MCNC: 27 MG/DL (ref 6–20)
BUN SERPL-MCNC: 27 MG/DL (ref 6–20)
BUN/CREAT SERPL: 23 (ref 12–20)
BUN/CREAT SERPL: 25 (ref 12–20)
CALCIUM SERPL-MCNC: 7.4 MG/DL (ref 8.5–10.1)
CALCIUM SERPL-MCNC: 7.8 MG/DL (ref 8.5–10.1)
CALCULATED P AXIS, ECG09: 38 DEGREES
CALCULATED R AXIS, ECG10: -37 DEGREES
CALCULATED T AXIS, ECG11: 29 DEGREES
CHLORIDE SERPL-SCNC: 110 MMOL/L (ref 97–108)
CHLORIDE SERPL-SCNC: 112 MMOL/L (ref 97–108)
CHOLEST SERPL-MCNC: 96 MG/DL
CO2 SERPL-SCNC: 25 MMOL/L (ref 21–32)
CO2 SERPL-SCNC: 26 MMOL/L (ref 21–32)
COMMENT, HOLDF: NORMAL
CREAT SERPL-MCNC: 1.1 MG/DL (ref 0.55–1.02)
CREAT SERPL-MCNC: 1.2 MG/DL (ref 0.55–1.02)
DIAGNOSIS, 93000: NORMAL
DIFFERENTIAL METHOD BLD: ABNORMAL
ECHO AV AREA PEAK VELOCITY: 2 CM2
ECHO AV AREA VTI: 2.3 CM2
ECHO AV AREA/BSA PEAK VELOCITY: 1.1 CM2/M2
ECHO AV AREA/BSA VTI: 1.2 CM2/M2
ECHO AV MEAN GRADIENT: 7.9 MMHG
ECHO AV PEAK GRADIENT: 15.3 MMHG
ECHO AV PEAK VELOCITY: 195.84 CM/S
ECHO AV VTI: 39.46 CM
ECHO EST RA PRESSURE: 10 MMHG
ECHO LV INTERNAL DIMENSION DIASTOLIC: 4.03 CM (ref 3.9–5.3)
ECHO LV INTERNAL DIMENSION SYSTOLIC: 2.25 CM
ECHO LV IVSD: 1.34 CM (ref 0.6–0.9)
ECHO LV MASS 2D: 227.6 G (ref 67–162)
ECHO LV MASS INDEX 2D: 124.2 G/M2 (ref 43–95)
ECHO LV POSTERIOR WALL DIASTOLIC: 1.3 CM (ref 0.6–0.9)
ECHO LVOT DIAM: 2.07 CM
ECHO LVOT PEAK GRADIENT: 5.6 MMHG
ECHO LVOT PEAK VELOCITY: 118.41 CM/S
ECHO LVOT SV: 90.9 ML
ECHO LVOT VTI: 27.03 CM
ECHO MV A VELOCITY: 119.9 CM/S
ECHO MV AREA PHT: 2.7 CM2
ECHO MV E VELOCITY: 86.45 CM/S
ECHO MV E/A RATIO: 0.72
ECHO MV PRESSURE HALF TIME (PHT): 81.7 MS
ECHO MV REGURGITANT PEAK GRADIENT: 55.8 MMHG
ECHO MV REGURGITANT PEAK VELOCITY: 373.63 CM/S
ECHO PULMONARY ARTERY SYSTOLIC PRESSURE (PASP): 31.4 MMHG
ECHO PV MAX VELOCITY: 75.06 CM/S
ECHO PV PEAK GRADIENT: 2.3 MMHG
ECHO RIGHT VENTRICULAR SYSTOLIC PRESSURE (RVSP): 31.4 MMHG
ECHO TV REGURGITANT MAX VELOCITY: 231.5 CM/S
ECHO TV REGURGITANT PEAK GRADIENT: 21.4 MMHG
EOSINOPHIL # BLD: 0 K/UL (ref 0–0.4)
EOSINOPHIL NFR BLD: 0 % (ref 0–7)
ERYTHROCYTE [DISTWIDTH] IN BLOOD BY AUTOMATED COUNT: 12.9 % (ref 11.5–14.5)
GLUCOSE BLD STRIP.AUTO-MCNC: 105 MG/DL (ref 65–100)
GLUCOSE BLD STRIP.AUTO-MCNC: 105 MG/DL (ref 65–100)
GLUCOSE BLD STRIP.AUTO-MCNC: 109 MG/DL (ref 65–100)
GLUCOSE BLD STRIP.AUTO-MCNC: 118 MG/DL (ref 65–100)
GLUCOSE BLD STRIP.AUTO-MCNC: 124 MG/DL (ref 65–100)
GLUCOSE BLD STRIP.AUTO-MCNC: 137 MG/DL (ref 65–100)
GLUCOSE BLD STRIP.AUTO-MCNC: 147 MG/DL (ref 65–100)
GLUCOSE BLD STRIP.AUTO-MCNC: 171 MG/DL (ref 65–100)
GLUCOSE BLD STRIP.AUTO-MCNC: 191 MG/DL (ref 65–100)
GLUCOSE BLD STRIP.AUTO-MCNC: 202 MG/DL (ref 65–100)
GLUCOSE BLD STRIP.AUTO-MCNC: 231 MG/DL (ref 65–100)
GLUCOSE BLD STRIP.AUTO-MCNC: 82 MG/DL (ref 65–100)
GLUCOSE BLD STRIP.AUTO-MCNC: 86 MG/DL (ref 65–100)
GLUCOSE BLD STRIP.AUTO-MCNC: 95 MG/DL (ref 65–100)
GLUCOSE BLD STRIP.AUTO-MCNC: 97 MG/DL (ref 65–100)
GLUCOSE SERPL-MCNC: 103 MG/DL (ref 65–100)
GLUCOSE SERPL-MCNC: 104 MG/DL (ref 65–100)
HCT VFR BLD AUTO: 31 % (ref 35–47)
HDLC SERPL-MCNC: 67 MG/DL
HDLC SERPL: 1.4 {RATIO} (ref 0–5)
HGB BLD-MCNC: 11 G/DL (ref 11.5–16)
IMM GRANULOCYTES # BLD AUTO: 0.1 K/UL (ref 0–0.04)
IMM GRANULOCYTES NFR BLD AUTO: 1 % (ref 0–0.5)
LDLC SERPL CALC-MCNC: 19 MG/DL (ref 0–100)
LIPID PROFILE,FLP: NORMAL
LYMPHOCYTES # BLD: 0.8 K/UL (ref 0.8–3.5)
LYMPHOCYTES NFR BLD: 7 % (ref 12–49)
MAGNESIUM SERPL-MCNC: 1.7 MG/DL (ref 1.6–2.4)
MAGNESIUM SERPL-MCNC: 1.8 MG/DL (ref 1.6–2.4)
MAGNESIUM SERPL-MCNC: 2 MG/DL (ref 1.6–2.4)
MCH RBC QN AUTO: 32.3 PG (ref 26–34)
MCHC RBC AUTO-ENTMCNC: 35.5 G/DL (ref 30–36.5)
MCV RBC AUTO: 90.9 FL (ref 80–99)
MONOCYTES # BLD: 1.8 K/UL (ref 0–1)
MONOCYTES NFR BLD: 16 % (ref 5–13)
NEUTS SEG # BLD: 8.7 K/UL (ref 1.8–8)
NEUTS SEG NFR BLD: 76 % (ref 32–75)
NRBC # BLD: 0 K/UL (ref 0–0.01)
NRBC BLD-RTO: 0 PER 100 WBC
P-R INTERVAL, ECG05: 134 MS
PHOSPHATE SERPL-MCNC: 0.8 MG/DL (ref 2.6–4.7)
PHOSPHATE SERPL-MCNC: 2.4 MG/DL (ref 2.6–4.7)
PLATELET # BLD AUTO: 222 K/UL (ref 150–400)
PMV BLD AUTO: 10 FL (ref 8.9–12.9)
POTASSIUM SERPL-SCNC: 3.2 MMOL/L (ref 3.5–5.1)
POTASSIUM SERPL-SCNC: 4.3 MMOL/L (ref 3.5–5.1)
Q-T INTERVAL, ECG07: 366 MS
QRS DURATION, ECG06: 88 MS
QTC CALCULATION (BEZET), ECG08: 445 MS
RBC # BLD AUTO: 3.41 M/UL (ref 3.8–5.2)
SAMPLES BEING HELD,HOLD: NORMAL
SERVICE CMNT-IMP: ABNORMAL
SERVICE CMNT-IMP: NORMAL
SODIUM SERPL-SCNC: 142 MMOL/L (ref 136–145)
SODIUM SERPL-SCNC: 144 MMOL/L (ref 136–145)
THERAPEUTIC RANGE,PTTT: ABNORMAL SECS (ref 58–77)
THERAPEUTIC RANGE,PTTT: NORMAL SECS (ref 58–77)
TRIGL SERPL-MCNC: 50 MG/DL (ref ?–150)
TROPONIN I SERPL-MCNC: 3.25 NG/ML
TROPONIN I SERPL-MCNC: 3.56 NG/ML
VENTRICULAR RATE, ECG03: 89 BPM
VLDLC SERPL CALC-MCNC: 10 MG/DL
WBC # BLD AUTO: 11.3 K/UL (ref 3.6–11)

## 2019-06-11 PROCEDURE — 82962 GLUCOSE BLOOD TEST: CPT

## 2019-06-11 PROCEDURE — 85730 THROMBOPLASTIN TIME PARTIAL: CPT

## 2019-06-11 PROCEDURE — 80048 BASIC METABOLIC PNL TOTAL CA: CPT

## 2019-06-11 PROCEDURE — 77030032490 HC SLV COMPR SCD KNE COVD -B

## 2019-06-11 PROCEDURE — 84100 ASSAY OF PHOSPHORUS: CPT

## 2019-06-11 PROCEDURE — 93306 TTE W/DOPPLER COMPLETE: CPT

## 2019-06-11 PROCEDURE — 74011000258 HC RX REV CODE- 258: Performed by: EMERGENCY MEDICINE

## 2019-06-11 PROCEDURE — 74011250637 HC RX REV CODE- 250/637: Performed by: HOSPITALIST

## 2019-06-11 PROCEDURE — 80061 LIPID PANEL: CPT

## 2019-06-11 PROCEDURE — 74011000250 HC RX REV CODE- 250: Performed by: HOSPITALIST

## 2019-06-11 PROCEDURE — 93005 ELECTROCARDIOGRAM TRACING: CPT

## 2019-06-11 PROCEDURE — 74011000258 HC RX REV CODE- 258: Performed by: INTERNAL MEDICINE

## 2019-06-11 PROCEDURE — 74011250637 HC RX REV CODE- 250/637: Performed by: NURSE PRACTITIONER

## 2019-06-11 PROCEDURE — 77030038269 HC DRN EXT URIN PURWCK BARD -A

## 2019-06-11 PROCEDURE — 74011250637 HC RX REV CODE- 250/637: Performed by: INTERNAL MEDICINE

## 2019-06-11 PROCEDURE — 74011250636 HC RX REV CODE- 250/636: Performed by: HOSPITALIST

## 2019-06-11 PROCEDURE — 83735 ASSAY OF MAGNESIUM: CPT

## 2019-06-11 PROCEDURE — 74011636637 HC RX REV CODE- 636/637: Performed by: INTERNAL MEDICINE

## 2019-06-11 PROCEDURE — 85025 COMPLETE CBC W/AUTO DIFF WBC: CPT

## 2019-06-11 PROCEDURE — 36415 COLL VENOUS BLD VENIPUNCTURE: CPT

## 2019-06-11 PROCEDURE — 74011636637 HC RX REV CODE- 636/637: Performed by: EMERGENCY MEDICINE

## 2019-06-11 PROCEDURE — 65660000000 HC RM CCU STEPDOWN

## 2019-06-11 PROCEDURE — 84484 ASSAY OF TROPONIN QUANT: CPT

## 2019-06-11 RX ORDER — INSULIN GLARGINE 100 [IU]/ML
22 INJECTION, SOLUTION SUBCUTANEOUS EVERY 24 HOURS
Status: DISCONTINUED | OUTPATIENT
Start: 2019-06-11 | End: 2019-06-14

## 2019-06-11 RX ORDER — ATORVASTATIN CALCIUM 20 MG/1
20 TABLET, FILM COATED ORAL
Status: DISCONTINUED | OUTPATIENT
Start: 2019-06-11 | End: 2019-06-15 | Stop reason: HOSPADM

## 2019-06-11 RX ORDER — METOPROLOL TARTRATE 25 MG/1
12.5 TABLET, FILM COATED ORAL EVERY 12 HOURS
Status: DISCONTINUED | OUTPATIENT
Start: 2019-06-11 | End: 2019-06-15 | Stop reason: HOSPADM

## 2019-06-11 RX ORDER — LEVOTHYROXINE SODIUM 150 UG/1
150 TABLET ORAL
Status: DISCONTINUED | OUTPATIENT
Start: 2019-06-11 | End: 2019-06-15 | Stop reason: HOSPADM

## 2019-06-11 RX ORDER — SODIUM CHLORIDE 450 MG/100ML
50 INJECTION, SOLUTION INTRAVENOUS CONTINUOUS
Status: DISCONTINUED | OUTPATIENT
Start: 2019-06-11 | End: 2019-06-12

## 2019-06-11 RX ORDER — CLOPIDOGREL BISULFATE 75 MG/1
75 TABLET ORAL DAILY
Status: DISCONTINUED | OUTPATIENT
Start: 2019-06-11 | End: 2019-06-15 | Stop reason: HOSPADM

## 2019-06-11 RX ORDER — HEPARIN SODIUM 10000 [USP'U]/100ML
12-25 INJECTION, SOLUTION INTRAVENOUS
Status: DISCONTINUED | OUTPATIENT
Start: 2019-06-11 | End: 2019-06-14

## 2019-06-11 RX ORDER — ASPIRIN 81 MG/1
81 TABLET ORAL DAILY
Status: DISCONTINUED | OUTPATIENT
Start: 2019-06-11 | End: 2019-06-15 | Stop reason: HOSPADM

## 2019-06-11 RX ORDER — POTASSIUM CHLORIDE 20 MEQ/1
40 TABLET, EXTENDED RELEASE ORAL
Status: COMPLETED | OUTPATIENT
Start: 2019-06-11 | End: 2019-06-11

## 2019-06-11 RX ORDER — DEXTROSE, SODIUM CHLORIDE, AND POTASSIUM CHLORIDE 5; .45; .3 G/100ML; G/100ML; G/100ML
INJECTION INTRAVENOUS CONTINUOUS
Status: DISCONTINUED | OUTPATIENT
Start: 2019-06-11 | End: 2019-06-11

## 2019-06-11 RX ORDER — MAGNESIUM SULFATE 100 %
4 CRYSTALS MISCELLANEOUS AS NEEDED
Status: DISCONTINUED | OUTPATIENT
Start: 2019-06-11 | End: 2019-06-15 | Stop reason: HOSPADM

## 2019-06-11 RX ORDER — HEPARIN SODIUM 5000 [USP'U]/ML
4000 INJECTION, SOLUTION INTRAVENOUS; SUBCUTANEOUS AS NEEDED
Status: DISCONTINUED | OUTPATIENT
Start: 2019-06-11 | End: 2019-06-15 | Stop reason: HOSPADM

## 2019-06-11 RX ORDER — INSULIN LISPRO 100 [IU]/ML
INJECTION, SOLUTION INTRAVENOUS; SUBCUTANEOUS
Status: DISCONTINUED | OUTPATIENT
Start: 2019-06-11 | End: 2019-06-12

## 2019-06-11 RX ORDER — HEPARIN SODIUM 5000 [USP'U]/ML
2000 INJECTION, SOLUTION INTRAVENOUS; SUBCUTANEOUS AS NEEDED
Status: DISCONTINUED | OUTPATIENT
Start: 2019-06-11 | End: 2019-06-15 | Stop reason: HOSPADM

## 2019-06-11 RX ADMIN — DEXTROSE MONOHYDRATE, SODIUM CHLORIDE, AND POTASSIUM CHLORIDE: 50; 4.5; 2.98 INJECTION, SOLUTION INTRAVENOUS at 00:33

## 2019-06-11 RX ADMIN — INSULIN GLARGINE 22 UNITS: 100 INJECTION, SOLUTION SUBCUTANEOUS at 12:31

## 2019-06-11 RX ADMIN — METOPROLOL TARTRATE 12.5 MG: 25 TABLET ORAL at 21:44

## 2019-06-11 RX ADMIN — METOPROLOL TARTRATE 12.5 MG: 25 TABLET ORAL at 14:37

## 2019-06-11 RX ADMIN — HEPARIN SODIUM AND DEXTROSE 12 UNITS/KG/HR: 10000; 5 INJECTION INTRAVENOUS at 04:57

## 2019-06-11 RX ADMIN — Medication 1 AMPULE: at 21:00

## 2019-06-11 RX ADMIN — Medication 1 AMPULE: at 12:36

## 2019-06-11 RX ADMIN — Medication 10 ML: at 21:45

## 2019-06-11 RX ADMIN — SODIUM CHLORIDE 3.5 UNITS/HR: 900 INJECTION, SOLUTION INTRAVENOUS at 02:40

## 2019-06-11 RX ADMIN — Medication 1 AMPULE: at 01:33

## 2019-06-11 RX ADMIN — SODIUM CHLORIDE 50 ML/HR: 450 INJECTION, SOLUTION INTRAVENOUS at 12:04

## 2019-06-11 RX ADMIN — ASPIRIN 81 MG: 81 TABLET ORAL at 14:37

## 2019-06-11 RX ADMIN — BRIMONIDINE TARTRATE 1 DROP: 2 SOLUTION OPHTHALMIC at 21:49

## 2019-06-11 RX ADMIN — Medication 10 ML: at 06:00

## 2019-06-11 RX ADMIN — CLOPIDOGREL BISULFATE 75 MG: 75 TABLET, FILM COATED ORAL at 12:31

## 2019-06-11 RX ADMIN — SODIUM CHLORIDE 50 ML/HR: 450 INJECTION, SOLUTION INTRAVENOUS at 21:45

## 2019-06-11 RX ADMIN — Medication 10 ML: at 14:33

## 2019-06-11 RX ADMIN — DEXTROSE MONOHYDRATE, SODIUM CHLORIDE, AND POTASSIUM CHLORIDE: 50; 4.5; 2.98 INJECTION, SOLUTION INTRAVENOUS at 08:21

## 2019-06-11 RX ADMIN — BRIMONIDINE TARTRATE 1 DROP: 2 SOLUTION OPHTHALMIC at 12:36

## 2019-06-11 RX ADMIN — SODIUM CHLORIDE: 900 INJECTION, SOLUTION INTRAVENOUS at 05:53

## 2019-06-11 RX ADMIN — HEPARIN SODIUM 2000 UNITS: 5000 INJECTION INTRAVENOUS; SUBCUTANEOUS at 19:16

## 2019-06-11 RX ADMIN — Medication 10 ML: at 02:42

## 2019-06-11 RX ADMIN — POTASSIUM CHLORIDE 40 MEQ: 20 TABLET, EXTENDED RELEASE ORAL at 00:39

## 2019-06-11 RX ADMIN — ATORVASTATIN CALCIUM 20 MG: 20 TABLET, FILM COATED ORAL at 21:44

## 2019-06-11 RX ADMIN — LEVOTHYROXINE SODIUM 150 MCG: 150 TABLET ORAL at 12:31

## 2019-06-11 RX ADMIN — INSULIN LISPRO 3 UNITS: 100 INJECTION, SOLUTION INTRAVENOUS; SUBCUTANEOUS at 16:44

## 2019-06-11 NOTE — CONSULTS
[address] [place] [address] [address] [facility]     Date of  Admission: 6/10/2019 10:00 AM     Admission type:Emergency    Consult for: NSTEMI/elevated troponin  Consult by: Hospitalist     Subjective:     Rome Inman is a 69 y.o. female admitted for DKA (diabetic ketoacidoses) ([facility] [E13.10], Acute encephalopathy [G93.40], SIRS (systemic inflammatory response syndrome) ([facility] [R65.10], ONEYDA (acute kidney injury) ([facility] [N17.9], Lactic acidosis [E87.2]. Admitted with AMS, lethargic, found to be in DKA. She has hx of previous admissions for DKA due to noncompliance with medications. Metabolic acidotic. This AM patient is awake, alert, oriented to place, denies CP, SOB, dizziness/lightheadedness. Denies previous cardiac hx. ECG with SR, troponin elevated at 3.25.     Cardiac risk factors: dyslipidemia, obesity, sedentary life style, hypertension, post-menopausal.      Patient Active Problem List    Diagnosis Date Noted    Vascular dementia without behavioral disturbance 10/20/2018     Priority: 3 - Three    Elevated troponin 06/11/2019     Priority: 4 - Four    Lactic acidosis 06/10/2019    SIRS (systemic inflammatory response syndrome) ([facility] 06/10/2019    [facility]DA [facility] 06/10/2019    Acute encephalopathy 06/10/2019    H/O: CVA (cerebrovascular accident) 11/06/2018    Hyperglycemia due to type 1 diabetes mellitus ([facility] 10/15/2018    Hypoglycemia 08/27/2018    DKA (diabetic ketoacidoses) ([facility] 06/02/2017    Dyslipidemia 02/06/2015    Hypertension 02/06/2015    Hypothyroidism 02/06/2015    Memory deficit 02/06/2015      Palak Morgan MD  Past Medical History:   Diagnosis Date    Diabetes ([facility]     Heart failure ([address]     unknown to family    Hypercholesteremia     Hypertension     Stroke ([address]     Thyroid disease       Social History     Socioeconomic History    Marital status:      Spouse name: Not on file    Number of children: 1    Years of education: Not on file    Highest education level: Not on file   Occupational History    Occupation: retired   Tobacco Use    Smoking status: Never Smoker    Smokeless tobacco: Never Used   Substance and Sexual Activity    Alcohol use: No     Comment: been years    Drug use: No    Sexual activity: Not Currently     No Known Allergies   Family History   Problem Relation Age of Onset    Diabetes Father     No Known Problems Mother       Current Facility-Administered Medications   Medication Dose Route Frequency    dextrose 5% - 0.45% NaCl with KCl 40 mEq/L infusion   IntraVENous CONTINUOUS    alcohol 62% (NOZIN) nasal  1 Ampule  1 Ampule Topical Q12H    heparin 25,000 units in D5W 250 ml infusion  12-25 Units/kg/hr IntraVENous TITRATE    potassium phosphate 30 mmol in 0.9% sodium chloride 250 mL infusion   IntraVENous ONCE    heparin (porcine) injection 2,000 Units  2,000 Units IntraVENous PRN    Or    heparin (porcine) injection 4,000 Units  4,000 Units IntraVENous PRN    insulin regular (NOVOLIN R, HUMULIN R) 100 Units in 0.9% sodium chloride 100 mL infusion  0-50 Units/hr IntraVENous TITRATE    glucose chewable tablet 16 g  4 Tab Oral PRN    glucagon (GLUCAGEN) injection 1 mg  1 mg IntraMUSCular PRN    dextrose 10% infusion 100 mL  100 mL IntraVENous PRN    sodium chloride (NS) flush 5-40 mL  5-40 mL IntraVENous Q8H    sodium chloride (NS) flush 5-40 mL  5-40 mL IntraVENous PRN    acetaminophen (TYLENOL) suppository 650 mg  650 mg Rectal Q6H PRN    ondansetron (ZOFRAN) injection 4 mg  4 mg IntraVENous Q6H PRN    brimonidine (ALPHAGAN) 0.2 % ophthalmic solution 1 Drop  1 Drop Both Eyes BID    [START ON 6/16/2019] levothyroxine (SYNTHROID) injection 112 mcg  112 mcg IntraVENous Q24H    aspirin (ASA) 600 mg suppository 300 mg  300 mg Rectal DAILY        Review of Symptoms:   11 systems reviewed, negative other than as stated in the HPI        Objective:      Visit Vitals  /49   Pulse 94   Temp 98 °F (36.7 °C)   Resp 23   Ht 5' 5\" (1.651 m)   Wt 75.9 kg (167 lb 5.3 oz)   SpO2 100%   BMI 27.85 kg/m²       Physical:   General: elderly AAF in no acute distress  Heart: RRR, no m/S3/JVD, no carotid bruits   Lungs: diminished  Abdomen: Soft, +BS, NTND   Extremities: LE peter  no edema   Neurologic: Grossly normal    Data Review:   Recent Labs     06/11/19  0229 06/10/19  1030   WBC 11.3* 16.7*   HGB 11.0* 12.4   HCT 31.0* 40.0    269     Recent Labs     06/11/19  0229 06/10/19  2215 06/10/19  1804 06/10/19  1352 06/10/19  1030    141 140 136 127*   K 3.2* 2.9* 3.0* 4.4 5.9*   * 107 107 101 93*   CO2 26 19* 9* <5* <5*   * 285* 527* 766* 904*   BUN 27* 29* 35* 36* 34*   CREA 1.20* 1.37* 1.68* 1.83* 1.76*   CA 7.8* 7.9* 7.2* 7.9* 8.9   MG 1.8 2.0  --  2.5*  --    PHOS 0.8*  --   --  8.5*  --    ALB  --   --   --   --  3.5   TBILI  --   --   --   --  0.5   SGOT  --   --   --   --  33   ALT  --   --   --   --  32       Recent Labs     06/11/19  0229 06/10/19  2215 06/10/19  1804   TROIQ 3.25* 1.86* 0.22*   CPK  --   --  139   CKMB  --   --  9.4*         Intake/Output Summary (Last 24 hours) at 6/11/2019 0815  Last data filed at 6/11/2019 0700  Gross per 24 hour   Intake 5507.55 ml   Output 450 ml   Net 5057.55 ml        Cardiographics    Telemetry: SR  ECG: SR with no acute changes    Echocardiogram: pending    CXRAY: no acute process, cardiomegaly        Assessment:       Active Problems:    Elevated troponin (6/11/2019)      DKA (diabetic k[facility]acidoses) ([facility] (6/2/2017)      Lactic acidosis (6/10/2019)      SIRS (systemic inflammatory response syndrome) ([facility]) (6/10/2019)      ONEYDA (acute kidney injury) ([facility] (6/10/2019)      Acute encephalopathy (6/10/2019)         Plan:     NSTEMI:  Troponin trending upward, no obvious s/s anginal equivalent. May be R/T ONEYDA, DKA, acidosis.     With multiple risk factors, would recommend further cardiac ischemic evaluation once patient recovers  Start ASA 81mg daily, metoprolol 12.5mg BID, and lipitor. Patient is on Heparin IV drip. Echo pending  Further discussion with patient and family reference her compliance. Specifically if patient were to receive a cardiac cath and require a REJI, which would be necessary for the patient to take an antiplatelet medication routinely. DKA:  Per hospitalist, resolving    ONEYDA:  Improving with hydration    Thank you for consulting [facility]. Arely Anguiano NP     Patient seen and examined by me with nurse practitioner Kenna Osler. I personally performed all components of the history, physical, and medical decision making and agree with the assessment and plan with minor modifications as noted. Type II NSTEMI without chest pain or EKG changes in the setting of DKA. Purse ischemic evaluation after further discussion with patient and when patient is medically stable, after we can assure compliance with medications. Follow-up echo.

## 2019-06-11 NOTE — PROGRESS NOTES
Nutrition Assessment:    RECOMMENDATIONS:   Continue Diabetic, Low Na diet    DIETITIANS INTERVENTIONS/PLAN:   Continue diet as tolerated  Monitor appetite/PO intake  Monitor BG    ASSESSMENT:   Pt admitted with DKA. PMH: CVA, HTN, DM, dementia. Chart reviewed, case discussed during CCU rounds. Pt to be transitioned off of insulin drip today. Diet just advanced. -231, DTC following and making recommendations. MST triggered for unsure of wt loss. Per EMR her UBW is 160-170lb. Pt is within normal wt range. Will monitor her appetite and need for supplements upon F/U.     SUBJECTIVE/OBJECTIVE:     Diet Order: Consistent carb, Other (comment)(2gNa )  % Eaten:  No data found. Pertinent Medications:lantus, humalog, KCl; [email or web address]). Chemistries:  Lab Results   Component Value Date/Time    Sodium 142 06/11/2019 08:05 AM    Potassium 4.3 06/11/2019 08:05 AM    Chloride 112 (H) 06/11/2019 08:05 AM    CO2 25 06/11/2019 08:05 AM    Anion gap 5 06/11/2019 08:05 AM    Glucose 104 (H) 06/11/2019 08:05 AM    BUN 27 (H) 06/11/2019 08:05 AM    Creatinine 1.10 (H) 06/11/2019 08:05 AM    BUN/Creatinine ratio 25 (H) 06/11/2019 08:05 AM    GFR est AA 58 (L) 06/11/2019 08:05 AM    GFR est non-AA 48 (L) 06/11/2019 08:05 AM    Calcium 7.4 (L) 06/11/2019 08:05 AM    AST (SGOT) 33 06/10/2019 10:30 AM    Alk. phosphatase 153 (H) 06/10/2019 10:30 AM    Protein, total 6.9 06/10/2019 10:30 AM    Albumin 3.5 06/10/2019 10:30 AM    Globulin 3.4 06/10/2019 10:30 AM    A-G Ratio 1.0 (L) 06/10/2019 10:30 AM    ALT (SGPT) 32 06/10/2019 10:30 AM      Anthropometrics: Height: 5' 5\" (165.1 cm) Weight: 75.8 kg (167 lb)  [x]bed scale (6/11)   []stated   []unknown    IBW (%IBW):   ( ) UBW (%UBW):   (  %)    BMI: Body mass index is 27.79 kg/m².     This BMI is indicative of:  []Underweight   [x]Normal(for age)   []Overweight   [] Obesity   [] Extreme Obesity (BMI>40)  Estimated Nutrition Needs (Based on): 1616 Kcals/day(MSJ 1243 x 1.3) , 76 g(1gPro/kg) Protein  Carbohydrate: At Least 130 g/day  Fluids: 1600 mL/day    Last BM: 6/11   [x]Active     []Hyperactive  []Hypoactive       [] Absent   BS  Skin:    [x] Intact   [] Incision  [] Breakdown   [] DTI   [] Tears/Excoriation/Abrasion  []Edema [] Other: Wt Readings from Last 30 Encounters:   06/11/19 75.8 kg (167 lb)   05/28/19 72.6 kg (160 lb 1.6 oz)   04/04/19 74.5 kg (164 lb 4.8 oz)   02/12/19 73.8 kg (162 lb 12.8 oz)   01/08/19 75.9 kg (167 lb 6.4 oz)   12/04/18 76.6 kg (168 lb 14.4 oz)   11/15/18 78.4 kg (172 lb 14.4 oz)   11/08/18 76.8 kg (169 lb 5 oz)   11/03/18 77.1 kg (170 lb)   11/01/18 79.3 kg (174 lb 14.4 oz)   10/21/18 81 kg (178 lb 9.2 oz)   10/15/18 76.1 kg (167 lb 12.8 oz)   10/09/18 78.9 kg (173 lb 15.1 oz)   09/09/18 77.1 kg (170 lb)   09/06/18 77.1 kg (170 lb)   08/27/18 68 kg (150 lb)   08/24/18 74.8 kg (164 lb 12.8 oz)   08/21/18 74.4 kg (164 lb)   08/09/18 74.6 kg (164 lb 7.4 oz)   07/10/18 74.6 kg (164 lb 6.4 oz)   12/25/17 75.5 kg (166 lb 7.2 oz)   12/21/17 75.5 kg (166 lb 6.4 oz)   11/10/17 79.8 kg (175 lb 14.4 oz)   11/09/17 75.8 kg (167 lb 1.7 oz)   11/04/17 77.1 kg (170 lb)   10/12/17 78.9 kg (173 lb 14.4 oz)   07/30/17 77.6 kg (171 lb)   07/25/17 77.7 kg (171 lb 3.2 oz)   06/12/17 79.3 kg (174 lb 14.4 oz)   06/02/17 79.4 kg (175 lb)      NUTRITION DIAGNOSES:   Problem:  Altered nutrition-related lab values      Etiology: related to DKA      Signs/Symptoms: as evidenced by BG in 200's, need for insulin drip. NUTRITION INTERVENTIONS:  Meals/Snacks: General/healthful diet                  GOAL:   Pt will consume >50% of meals with BG <200mg/dL in 3-5 days.      Cultural, Shinto, or Ethnic Dietary Needs: None     LEARNING NEEDS (Diet, Food/Nutrient-Drug Interaction):    [x] None Identified   [] Identified and Education Provided/Documented   [] Identified and Pt declined/was not appropriate      [x] Interdisciplinary Care Plan Reviewed/Documented    [x] Participated in Discharge Planning: Diabetic, Low Na diet    [x] Interdisciplinary Rounds     NUTRITION RISK:    [] High              [] Moderate           [x]  Low  []  Minimal/Uncompromised    PT SEEN FOR:    []  MD Consult: []Calorie Count      []Diabetic Diet Education        []Diet Education     []Electrolyte Management     []General Nutrition Management and Supplements     []Management of Tube Feeding     []TPN Recommendations    [x]  RN Referral:  [x]MST score >=2     []Enteral/Parenteral Nutrition PTA     []Pregnant: Gestational DM or Multigestation   []  Low BMI  []  Re-Screen   []  LOS   []  NPO/clears x 5 days   []  New TF/TPN    Nathalie Arcos RD, [address]   Pager [phone]  Weekend Pager [phone]

## 2019-06-11 NOTE — PROGRESS NOTES
TRANSFER - IN REPORT:    Verbal report received from [facility] (name) on Maryse Sher  being received from PCU(unit) for routine progression of care      Report consisted of patients Situation, Background, Assessment and   Recommendations(SBAR). Information from the following report(s) SBAR, Kardex, Procedure Summary, Intake/Output, MAR and Recent Results was reviewed with the receiving nurse. Opportunity for questions and clarification was provided. Assessment completed upon patients arrival to unit and care assumed.

## 2019-06-11 NOTE — PROGRESS NOTES
4591 - Bedside report received from [facility]. Patient is awake and alert, VSS. Currently Insulin infusing @0.8,Heparin @ 12u/kg/hr, IVF @ 150.    1300 -  Spoke with Milana Iyer in pharmacy. Informed Milana Iyer that Heparin was held starting at 1030 to collect PTT. Per recommendations, will resume Heparin gtt at previous rate (12u/kg/hr) and collect PTT 6 hours from time of rate resume. 1839 - TRANSFER - OUT REPORT:    Verbal report given to Danika roselyn Samson  being transferred to [facility] for routine progression of care       Report consisted of patients Situation, Background, Assessment and   Recommendations(SBAR). Information from the following report(s) SBAR, Kardex, Intake/Output, MAR, Recent Results and Cardiac Rhythm NSR was reviewed with the receiving nurse. Lines:   Peripheral IV 06/10/19 Left;Posterior Hand (Active)   Site Assessment Clean, dry, & intact 6/11/2019 12:00 PM   Phlebitis Assessment 0 6/11/2019 12:00 PM   Infiltration Assessment 0 6/11/2019 12:00 PM   Dressing Status Clean, dry, & intact 6/11/2019 12:00 PM   Dressing Type Tape;Transparent 6/11/2019 12:00 PM   Hub Color/Line Status Blue; Infusing 6/11/2019 12:00 PM       Peripheral IV 06/10/19 Right; Lower Arm (Active)   Site Assessment Clean, dry, & intact 6/11/2019 12:00 PM   Phlebitis Assessment 0 6/11/2019 12:00 PM   Infiltration Assessment 0 6/11/2019 12:00 PM   Dressing Status Clean, dry, & intact 6/11/2019 12:00 PM   Dressing Type Tape;Transparent 6/11/2019 12:00 PM   Hub Color/Line Status Blue; Infusing 6/11/2019 12:00 PM   Alcohol Cap Used Yes 6/11/2019 12:01 AM        Opportunity for questions and clarification was provided.       Patient transported with:   Monitor  Registered Nurse  Tech

## 2019-06-11 NOTE — PROGRESS NOTES
[facility]  Pulmonary, Critical Care, and Sleep Medicine    Name: Janay Zapien MRN: [id number]   : 1940 Hospital: [facility]   Date: 2019        Critical Care    PCP: Kash Ledbetter    IMPRESSION:   · DKA, HX of DM. Has prior issues with low blood sugars. · Encephalopathy, was brought in by EMS.    · Lactic Acidosis  · Leukocytosis  · Mild Anemia  · Dehydration  · Hypothyroid      RECOMMENDATIONS:   · Serial labs  · Insulin Drip, transition to SSI  · Hydration  · Monitor Mental status, improved  · Heparin drip for elevated troponin     Subjective/History:     No acute events overnight  Pt awake, alert, following commands  No severe distress  Still on insulin drip, heparin drip      Current Facility-Administered Medications   Medication Dose Route Frequency    dextrose 5% - 0.45% NaCl with KCl 40 mEq/L infusion   IntraVENous CONTINUOUS    alcohol 62% (NOZIN) nasal  1 Ampule  1 Ampule Topical Q12H    heparin 25,000 units in D5W 250 ml infusion  12-25 Units/kg/hr IntraVENous TITRATE    potassium phosphate 30 mmol in 0.9% sodium chloride 250 mL infusion   IntraVENous ONCE    insulin regular (NOVOLIN R, HUMULIN R) 100 Units in 0.9% sodium chloride 100 mL infusion  0-50 Units/hr IntraVENous TITRATE    sodium chloride (NS) flush 5-40 mL  5-40 mL IntraVENous Q8H    brimonidine (ALPHAGAN) 0.2 % ophthalmic solution 1 Drop  1 Drop Both Eyes BID    [START ON 2019] levothyroxine (SYNTHROID) injection 112 mcg  112 mcg IntraVENous Q24H    aspirin (ASA) 600 mg suppository 300 mg  300 mg Rectal DAILY          Review of Systems:  No sob, no cp    Objective:   Vital Signs:    Visit Vitals  /49   Pulse 94   Temp 98 °F (36.7 °C)   Resp 23   Ht 5' 5\" (1.651 m)   Wt 75.9 kg (167 lb 5.3 oz)   SpO2 100%   BMI 27.85 kg/m²       O2 Device: Room air       Temp (24hrs), Av °F (36.7 °C), Min:97.2 °F (36.2 °C), Max:98.9 °F (37.2 °C)       Intake/Output: Last shift:      No intake/output data recorded. Last 3 shifts: 06/09 1901 - 06/11 0700  In: 5507.6 [P.O.:120; I.V.:5387.6]  Out: 450 [Urine:450]    Intake/Output Summary (Last 24 hours) at 6/11/2019 0756  Last data filed at 6/11/2019 0700  Gross per 24 hour   Intake 5507.55 ml   Output 450 ml   Net 5057.55 ml     Hemodynamics:   PAP:   CO:     Wedge:   CI:     CVP:    SVR:       PVR:       Ventilator Settings:  Mode Rate Tidal Volume Pressure FiO2 PEEP                    Peak airway pressure:      Minute ventilation:        Physical Exam:    General:  no distress, appears stated age. Head:  Normocephalic, without obvious abnormality, atraumatic. Eyes:  Conjunctivae/corneas clear. PERRL, EOMs intact. Nose: Nares normal. Septum midline. Mucosa normal. No drainage or sinus tenderness. Throat: Lips, mucosa, and tongue normal. Teeth and gums normal.   Neck: Supple, symmetrical, trachea midline, no adenopathy, thyroid: no enlargment/tenderness/nodules, no carotid bruit and no JVD. Back:   Symmetric, no curvature. ROM normal.   Lungs:   Clear to auscultation bilaterally. Pretty clear anteriorly. Chest wall:  No tenderness or deformity. Heart:  Tachycardic  rate and rhythm, S1, S2 normal, no murmur, click, rub or gallop. Abdomen:   Soft, non-tender. Bowel sounds normal. No masses,  No organomegaly. Extremities: Extremities normal, atraumatic, no cyanosis or edema. Pulses: 2+ and symmetric all extremities. Skin: Skin color, texture, turgor normal. No rashes or lesions   Lymph nodes: Cervical, supraclavicular, and axillary nodes normal.   Neurologic: Grossly nonfocal,   Psych: NO overt anxiety or depression.         Data:     Recent Results (from the past 24 hour(s))   GLUCOSE, POC    Collection Time: 06/10/19 10:04 AM   Result Value Ref Range    Glucose (POC) >600 (HH) 65 - 100 mg/dL    Performed by [id number] [place] [address], POC    Collection Time: 06/10/19 10:06 AM   Result Value Ref Range    Glucose (POC) >600 (HH) 65 - 100 mg/dL    Performed by Spencer Miner    CBC WITH AUTOMATED DIFF    Collection Time: 06/10/19 10:30 AM   Result Value Ref Range    WBC 16.7 (H) 3.6 - 11.0 K/uL    RBC 3.87 3.80 - 5.20 M/uL    HGB 12.4 11.5 - 16.0 g/dL    HCT 40.0 35.0 - 47.0 %    .4 (H) 80.0 - 99.0 FL    MCH 32.0 26.0 - 34.0 PG    MCHC 31.0 30.0 - 36.5 g/dL    RDW 13.4 11.5 - 14.5 %    PLATELET 644 835 - 440 K/uL    MPV 10.8 8.9 - 12.9 FL    NRBC 0.0 0  WBC    ABSOLUTE NRBC 0.00 0.00 - 0.01 K/uL    NEUTROPHILS 75 32 - 75 %    LYMPHOCYTES 10 (L) 12 - 49 %    MONOCYTES 10 5 - 13 %    EOSINOPHILS 0 0 - 7 %    BASOPHILS 1 0 - 1 %    IMMATURE GRANULOCYTES 4 (H) 0.0 - 0.5 %    ABS. NEUTROPHILS 12.4 (H) 1.8 - 8.0 K/UL    ABS. LYMPHOCYTES 1.7 0.8 - 3.5 K/UL    ABS. MONOCYTES 1.7 (H) 0.0 - 1.0 K/UL    ABS. EOSINOPHILS 0.0 0.0 - 0.4 K/UL    ABS. BASOPHILS 0.2 (H) 0.0 - 0.1 K/UL    ABS. IMM. GRANS. 0.7 (H) 0.00 - 0.04 K/UL    DF AUTOMATED      RBC COMMENTS NORMOCYTIC, NORMOCHROMIC     METABOLIC PANEL, COMPREHENSIVE    Collection Time: 06/10/19 10:30 AM   Result Value Ref Range    Sodium 127 (L) 136 - 145 mmol/L    Potassium 5.9 (H) 3.5 - 5.1 mmol/L    Chloride 93 (L) 97 - 108 mmol/L    CO2 <5 (LL) 21 - 32 mmol/L    Anion gap Cannot be calculated 5 - 15 mmol/L    Glucose 904 (HH) 65 - 100 mg/dL    BUN 34 (H) 6 - 20 MG/DL    Creatinine 1.76 (H) 0.55 - 1.02 MG/DL    BUN/Creatinine ratio 19 12 - 20      GFR est AA 34 (L) >60 ml/min/1.73m2    GFR est non-AA 28 (L) >60 ml/min/1.73m2    Calcium 8.9 8.5 - 10.1 MG/DL    Bilirubin, total 0.5 0.2 - 1.0 MG/DL    ALT (SGPT) 32 12 - 78 U/L    AST (SGOT) 33 15 - 37 U/L    Alk.  phosphatase 153 (H) 45 - 117 U/L    Protein, total 6.9 6.4 - 8.2 g/dL    Albumin 3.5 3.5 - 5.0 g/dL    Globulin 3.4 2.0 - 4.0 g/dL    A-G Ratio 1.0 (L) 1.1 - 2.2     SAMPLES BEING HELD    Collection Time: 06/10/19 10:30 AM   Result Value Ref Range    SAMPLES BEING HELD green,blue     COMMENT        Add-on orders for these samples will be processed based on acceptable specimen integrity and analyte stability, which may vary by analyte.    LACTIC ACID    Collection Time: 06/10/19 10:30 AM   Result Value Ref Range    Lactic acid 7.8 (HH) 0.4 - 2.0 MMOL/L   POC VENOUS BLOOD GAS    Collection Time: 06/10/19 11:14 AM   Result Value Ref Range    Device: ROOM AIR      pH, venous (POC) 6.909 (LL) 7.32 - 7.42      pCO2, venous (POC) <17.0 (L) 41 - 51 MMHG    pO2, venous (POC) 75 (H) 25 - 40 mmHg    Allens test (POC) N/A      Site OTHER      Specimen type (POC) VENOUS BLOOD     URINALYSIS W/ RFLX MICROSCOPIC    Collection Time: 06/10/19 11:18 AM   Result Value Ref Range    Color YELLOW/STRAW      Appearance CLEAR CLEAR      Specific gravity 1.022 1.003 - 1.030      pH (UA) 5.0 5.0 - 8.0      Protein 30 (A) NEG mg/dL    Glucose >1,000 (A) NEG mg/dL    Ketone 80 (A) NEG mg/dL    Bilirubin NEGATIVE  NEG      Blood TRACE (A) NEG      Urobilinogen 0.2 0.2 - 1.0 EU/dL    Nitrites NEGATIVE  NEG      Leukocyte Esterase NEGATIVE  NEG      WBC 0-4 0 - 4 /hpf    RBC 0-5 0 - 5 /hpf    Epithelial cells FEW FEW /lpf    Bacteria NEGATIVE  NEG /hpf    Amorphous Crystals FEW (A) NEG     POC VENOUS BLOOD GAS    Collection Time: 06/10/19 11:18 AM   Result Value Ref Range    Device: ROOM AIR      pH, venous (POC) 6.900 (LL) 7.32 - 7.42      pCO2, venous (POC) <17.0 (L) 41 - 51 MMHG    pO2, venous (POC) 74 (H) 25 - 40 mmHg    Allens test (POC) N/A      Site OTHER      Specimen type (POC) VENOUS BLOOD     EKG, 12 LEAD, INITIAL    Collection Time: 06/10/19 12:03 PM   Result Value Ref Range    Ventricular Rate 121 BPM    Atrial Rate 121 BPM    P-R Interval 160 ms    QRS Duration 104 ms    Q-T Interval 358 ms    QTC Calculation (Bezet) 508 ms    Calculated P Axis 47 degrees    Calculated R Axis -48 degrees    Calculated T Axis 80 degrees    Diagnosis       Sinus tachycardia with occasional premature ventricular complexes  Left axis deviation  Inferior infarct , age undetermined  Possible Anterior infarct (cited on or before 19-OCT-2018)  When compared with ECG of 19-OCT-2018 21:08,  premature ventricular complexes are now present  Vent.  rate has decreased BY  94 BPM  Right bundle branch block is no longer present  Questionable change in initial forces of Anterior leads     GLUCOSE, POC    Collection Time: 06/10/19 12:27 PM   Result Value Ref Range    Glucose (POC) >600 (HH) 65 - 100 mg/dL    Performed by [id number], POC    Collection Time: 06/10/19 12:28 PM   Result Value Ref Range    Glucose (POC) >600 (HH) 65 - 100 mg/dL    Performed by [id number], POC    Collection Time: 06/10/19  1:33 PM   Result Value Ref Range    Glucose (POC) >600 (HH) 65 - 100 mg/dL    Performed by Yisel Sauer ACID    Collection Time: 06/10/19  1:52 PM   Result Value Ref Range    Lactic acid 8.9 (HH) 0.4 - 2.0 MMOL/L   MAGNESIUM    Collection Time: 06/10/19  1:52 PM   Result Value Ref Range    Magnesium 2.5 (H) 1.6 - 2.4 mg/dL   METABOLIC PANEL, BASIC    Collection Time: 06/10/19  1:52 PM   Result Value Ref Range    Sodium 136 136 - 145 mmol/L    Potassium 4.4 3.5 - 5.1 mmol/L    Chloride 101 97 - 108 mmol/L    CO2 <5 (LL) 21 - 32 mmol/L    Anion gap Cannot be calculated 5 - 15 mmol/L    Glucose 766 (HH) 65 - 100 mg/dL    BUN 36 (H) 6 - 20 MG/DL    Creatinine 1.83 (H) 0.55 - 1.02 MG/DL    BUN/Creatinine ratio 20 12 - 20      GFR est AA 32 (L) >60 ml/min/1.73m2    GFR est non-AA 27 (L) >60 ml/min/1.73m2    Calcium 7.9 (L) 8.5 - 10.1 MG/DL   PHOSPHORUS    Collection Time: 06/10/19  1:52 PM   Result Value Ref Range    Phosphorus 8.5 (H) 2.6 - 4.7 MG/DL   GLUCOSE, POC    Collection Time: 06/10/19  2:37 PM   Result Value Ref Range    Glucose (POC) >600 (HH) 65 - 100 mg/dL    Performed by LISSETH KIMBLE (PCT)    GLUCOSE, POC    Collection Time: 06/10/19  2:39 PM   Result Value Ref Range    Glucose (POC) >600 () 65 - 100 mg/dL    Performed by LISSETH KIMBLE ([facility])    GLUCOSE, POC Collection Time: 06/10/19  3:54 PM   Result Value Ref Range    Glucose (POC) 557 (H) 65 - 100 mg/dL    Performed by [id number], POC    Collection Time: 06/10/19  4:59 PM   Result Value Ref Range    Glucose (POC) >600 (HH) 65 - 100 mg/dL    Performed by [id number], POC    Collection Time: 06/10/19  5:00 PM   Result Value Ref Range    Glucose (POC) 562 (H) 65 - 100 mg/dL    Performed by [address] [place] [address] Cristiane ACID    Collection Time: 06/10/19  5:53 PM   Result Value Ref Range    Lactic acid 5.0 (HH) 0.4 - 2.0 MMOL/L   CK W/ CKMB & INDEX    Collection Time: 06/10/19  6:04 PM   Result Value Ref Range     26 - 192 U/L    CK - MB 9.4 (H) <3.6 NG/ML    CK-MB Index 6.8 (H) 0.0 - 2.5     TROPONIN I    Collection Time: 06/10/19  6:04 PM   Result Value Ref Range    Troponin-I, Qt. 0.22 (H) <6.76 ng/mL   METABOLIC PANEL, BASIC    Collection Time: 06/10/19  6:04 PM   Result Value Ref Range    Sodium 140 136 - 145 mmol/L    Potassium 3.0 (L) 3.5 - 5.1 mmol/L    Chloride 107 97 - 108 mmol/L    CO2 9 (LL) 21 - 32 mmol/L    Anion gap 24 (H) 5 - 15 mmol/L    Glucose 527 (H) 65 - 100 mg/dL    BUN 35 (H) 6 - 20 MG/DL    Creatinine 1.68 (H) 0.55 - 1.02 MG/DL    BUN/Creatinine ratio 21 (H) 12 - 20      GFR est AA 36 (L) >60 ml/min/1.73m2    GFR est non-AA 30 (L) >60 ml/min/1.73m2    Calcium 7.2 (L) 8.5 - 10.1 MG/DL   GLUCOSE, POC    Collection Time: 06/10/19  6:05 PM   Result Value Ref Range    Glucose (POC) 516 (H) 65 - 100 mg/dL    Performed by [id number], POC    Collection Time: 06/10/19  7:15 PM   Result Value Ref Range    Glucose (POC) 462 (H) 65 - 100 mg/dL    Performed by Susan Coy    GLUCOSE, POC    Collection Time: 06/10/19  8:19 PM   Result Value Ref Range    Glucose (POC) 406 (H) 65 - 100 mg/dL    Performed by Susan Coy    GLUCOSE, POC    Collection Time: 06/10/19  9:19 PM   Result Value Ref Range    Glucose (POC) 347 (H) 65 - 100 mg/dL    Performed by Susan Coy METABOLIC PANEL, BASIC    Collection Time: 06/10/19 10:15 PM   Result Value Ref Range    Sodium 141 136 - 145 mmol/L    Potassium 2.9 (L) 3.5 - 5.1 mmol/L    Chloride 107 97 - 108 mmol/L    CO2 19 (L) 21 - 32 mmol/L    Anion gap 15 5 - 15 mmol/L    Glucose 285 (H) 65 - 100 mg/dL    BUN 29 (H) 6 - 20 MG/DL    Creatinine 1.37 (H) 0.55 - 1.02 MG/DL    BUN/Creatinine ratio 21 (H) 12 - 20      GFR est AA 45 (L) >60 ml/min/1.73m2    GFR est non-AA 37 (L) >60 ml/min/1.73m2    Calcium 7.9 (L) 8.5 - 10.1 MG/DL   TROPONIN I    Collection Time: 06/10/19 10:15 PM   Result Value Ref Range    Troponin-I, Qt. 1.86 (H) <0.05 ng/mL   MAGNESIUM    Collection Time: 06/10/19 10:15 PM   Result Value Ref Range    Magnesium 2.0 1.6 - 2.4 mg/dL   GLUCOSE, POC    Collection Time: 06/10/19 10:22 PM   Result Value Ref Range    Glucose (POC) 287 (H) 65 - 100 mg/dL    Performed by Jennifer Bro    GLUCOSE, POC    Collection Time: 06/10/19 11:25 PM   Result Value Ref Range    Glucose (POC) 213 (H) 65 - 100 mg/dL    Performed by [address] [place], [place] [address]Th [facility], POC    Collection Time: 06/11/19 12:26 AM   Result Value Ref Range    Glucose (POC) 171 (H) 65 - 100 mg/dL    Performed by Jennifer Bro    GLUCOSE, POC    Collection Time: 06/11/19  1:30 AM   Result Value Ref Range    Glucose (POC) 147 (H) 65 - 100 mg/dL    Performed by Sultana Vargas    METABOLIC PANEL, BASIC    Collection Time: 06/11/19  2:29 AM   Result Value Ref Range    Sodium 144 136 - 145 mmol/L    Potassium 3.2 (L) 3.5 - 5.1 mmol/L    Chloride 110 (H) 97 - 108 mmol/L    CO2 26 21 - 32 mmol/L    Anion gap 8 5 - 15 mmol/L    Glucose 103 (H) 65 - 100 mg/dL    BUN 27 (H) 6 - 20 MG/DL    Creatinine 1.20 (H) 0.55 - 1.02 MG/DL    BUN/Creatinine ratio 23 (H) 12 - 20      GFR est AA 53 (L) >60 ml/min/1.73m2    GFR est non-AA 44 (L) >60 ml/min/1.73m2    Calcium 7.8 (L) 8.5 - 10.1 MG/DL   CBC WITH AUTOMATED DIFF    Collection Time: 06/11/19  2:29 AM   Result Value Ref Range    WBC 11. 3 (H) 3.6 - 11.0 K/uL    RBC 3.41 (L) 3.80 - 5.20 M/uL    HGB 11.0 (L) 11.5 - 16.0 g/dL    HCT 31.0 (L) 35.0 - 47.0 %    MCV 90.9 80.0 - 99.0 FL    MCH 32.3 26.0 - 34.0 PG    MCHC 35.5 30.0 - 36.5 g/dL    RDW 12.9 11.5 - 14.5 %    PLATELET 625 689 - 970 K/uL    MPV 10.0 8.9 - 12.9 FL    NRBC 0.0 0  WBC    ABSOLUTE NRBC 0.00 0.00 - 0.01 K/uL    NEUTROPHILS 76 (H) 32 - 75 %    LYMPHOCYTES 7 (L) 12 - 49 %    MONOCYTES 16 (H) 5 - 13 %    EOSINOPHILS 0 0 - 7 %    BASOPHILS 0 0 - 1 %    IMMATURE GRANULOCYTES 1 (H) 0.0 - 0.5 %    ABS. NEUTROPHILS 8.7 (H) 1.8 - 8.0 K/UL    ABS. LYMPHOCYTES 0.8 0.8 - 3.5 K/UL    ABS. MONOCYTES 1.8 (H) 0.0 - 1.0 K/UL    ABS. EOSINOPHILS 0.0 0.0 - 0.4 K/UL    ABS. BASOPHILS 0.0 0.0 - 0.1 K/UL    ABS. IMM. GRANS. 0.1 (H) 0.00 - 0.04 K/UL    DF AUTOMATED     TROPONIN I    Collection Time: 06/11/19  2:29 AM   Result Value Ref Range    Troponin-I, Qt. 3.25 (H) <0.05 ng/mL   MAGNESIUM    Collection Time: 06/11/19  2:29 AM   Result Value Ref Range    Magnesium 1.8 1.6 - 2.4 mg/dL   PHOSPHORUS    Collection Time: 06/11/19  2:29 AM   Result Value Ref Range    Phosphorus 0.8 (LL) 2.6 - 4.7 MG/DL   SAMPLES BEING HELD    Collection Time: 06/11/19  2:29 AM   Result Value Ref Range    SAMPLES BEING HELD GRN     COMMENT        Add-on orders for these samples will be processed based on acceptable specimen integrity and analyte stability, which may vary by analyte.    GLUCOSE, POC    Collection Time: 06/11/19  2:38 AM   Result Value Ref Range    Glucose (POC) 109 (H) 65 - 100 mg/dL    Performed by Tomas German, POC    Collection Time: 06/11/19  3:42 AM   Result Value Ref Range    Glucose (POC) 97 65 - 100 mg/dL    Performed by Matthew Beckham    LIPID PANEL    Collection Time: 06/11/19  4:20 AM   Result Value Ref Range    LIPID PROFILE          Cholesterol, total 96 <200 MG/DL    Triglyceride 50 <150 MG/DL    HDL Cholesterol 67 MG/DL    LDL, calculated 19 0 - 100 MG/DL    VLDL, calculated 10 MG/DL    CHOL/HDL Ratio 1.4 0.0 - 5.0     EKG, 12 LEAD, INITIAL    Collection Time: 06/11/19  4:31 AM   Result Value Ref Range    Ventricular Rate 89 BPM    Atrial Rate 89 BPM    P-R Interval 134 ms    QRS Duration 88 ms    Q-T Interval 366 ms    QTC Calculation (Bezet) 445 ms    Calculated P Axis 38 degrees    Calculated R Axis -37 degrees    Calculated T Axis 29 degrees    Diagnosis       Normal sinus rhythm  Left axis deviation  Low voltage QRS  Abnormal ECG  When compared with ECG of 10-JAN-2019 12:03,  MANUAL COMPARISON REQUIRED, DATA IS UNCONFIRMED     GLUCOSE, POC    Collection Time: 06/11/19  4:46 AM   Result Value Ref Range    Glucose (POC) 82 65 - 100 mg/dL    Performed by [employer]    GLUCOSE, POC    Collection Time: 06/11/19  5:51 AM   Result Value Ref Range    Glucose (POC) 118 (H) 65 - 100 mg/dL    Performed by [employer]    GLUCOSE, POC    Collection Time: 06/11/19  6:46 AM   Result Value Ref Range    Glucose (POC) 86 65 - 100 mg/dL    Performed by Theador Economy              Telemetry:  EKG: Sinus tachycardia with occasional premature ventricular complexes   Left axis deviation   Inferior infarct , age undetermined   Possible Anterior infarct (cited on or before 19-OCT-2018)   When compared with ECG of 19-OCT-2018 21:08,   premature ventricular complexes are now present   Vent.  rate has decreased BY  94 BPM   Right bundle branch block is no longer present   Questionable change in initial forces of Anterior leads     Imaging:  I have personally reviewed the patients radiographs and have reviewed the reports:       Eunice Slater MD

## 2019-06-11 NOTE — PROGRESS NOTES
Hospital Progress Note    NAME:  Sandra Bryant   :   1940   MRN:  [id number]     Date/Time:  2019 8:23 AM    Plan:   1. Insulin drip>ssi  2. Card opinion re:trop -cont heparin  3. Correct metabolic dys  Risk of Deterioration: Low  []           Moderate  []           High  [x]                 Assessment:   Principal Problem:    DKA (diabetic ketoacidoses) ([facility] (2017)     Insulin drip     ssi    Active Problems:    Vascular dementia without behavioral disturbance (10/20/2018)      Elevated troponin (2019)     Heparin     Card opinion pending      Hypertension (2015)      Lactic acidosis (6/10/2019)     improving      S[facility]ic inflammatory response syndrome) [facility] (6/10/2019)      ONEYDA (acute kidney injury) ([facility] (6/10/2019)     Continue hydration      Acute encephalopathy (6/10/2019)     Improving this am        Admitting notes:78 y.o.  female with PMH IDDM, HTN, hyperlipidemia, hx CVA brought in by EMS for generalized weakness, dizziness. EMS reported patient with GCS 15 and alert and oriented x 4.  BS reading was high.     In ER, patient reported to be lethargic but answer some questions earlier. Currently obtunded, not withdrawing to pain stimuli. Open eyes to voice but looks dazed and not making eye contact. Unable to obtain history.     Was admitted in  for hypoglycemia.   Since then admitted 10/18 and  3 more times with DKA.       Hospitalist asked to admit for work up and evaluation of the above problems        Subjective:     Feeling better - more appropriate this am  11 Point Review of Systems:   Negative except no cp/sob    []            Unable to obtain ROS due to:       []            mental status change []            sedated []            intubated     Social History     Tobacco Use    Smoking status: Never Smoker    Smokeless tobacco: Never Used   Substance Use Topics    Alcohol use: No     Comment: been years     Medications reviewed:  Current Facility-Administered Medications   Medication Dose Route Frequency    dextrose 5% - 0.45% NaCl with KCl 40 mEq/L infusion   IntraVENous CONTINUOUS    alcohol 62% (NOZIN) nasal  1 Ampule  1 Ampule Topical Q12H    heparin 25,000 units in D5W 250 ml infusion  12-25 Units/kg/hr IntraVENous TITRATE    potassium phosphate 30 mmol in 0.9% sodium chloride 250 mL infusion   IntraVENous ONCE    heparin (porcine) injection 2,000 Units  2,000 Units IntraVENous PRN    Or    heparin (porcine) injection 4,000 Units  4,000 Units IntraVENous PRN    insulin regular (NOVOLIN R, HUMULIN R) 100 Units in 0.9% sodium chloride 100 mL infusion  0-50 Units/hr IntraVENous TITRATE    glucose chewable tablet 16 g  4 Tab Oral PRN    glucagon (GLUCAGEN) injection 1 mg  1 mg IntraMUSCular PRN    dextrose 10% infusion 100 mL  100 mL IntraVENous PRN    sodium chloride (NS) flush 5-40 mL  5-40 mL IntraVENous Q8H    sodium chloride (NS) flush 5-40 mL  5-40 mL IntraVENous PRN    acetaminophen (TYLENOL) suppository 650 mg  650 mg Rectal Q6H PRN    ondansetron (ZOFRAN) injection 4 mg  4 mg IntraVENous Q6H PRN    brimonidine (ALPHAGAN) 0.2 % ophthalmic solution 1 Drop  1 Drop Both Eyes BID    [START ON 2019] levothyroxine (SYNTHROID) injection 112 mcg  112 mcg IntraVENous Q24H    aspirin (ASA) 600 mg suppository 300 mg  300 mg Rectal DAILY        Objective:   Vitals:  Visit Vitals  /49   Pulse 94   Temp 98 °F (36.7 °C)   Resp 23   Ht 5' 5\" (1.651 m)   Wt 167 lb 5.3 oz (75.9 kg)   SpO2 100%   BMI 27.85 kg/m²     Temp (24hrs), Av °F (36.7 °C), Min:97.2 °F (36.2 °C), Max:98.9 °F (37.2 °C)      O2 Device: Room air    Last 24hr Input/Output:    Intake/Output Summary (Last 24 hours) at 2019 0823  Last data filed at 2019 0700  Gross per 24 hour   Intake 5507.55 ml   Output 450 ml   Net 5057.55 ml        PHYSICAL EXAM:  General:    Alert, cooperative, no distress, appears stated age. Head:   Normocephalic, without obvious abnormality, atraumatic. Eyes:   Conjunctivae/corneas clear. PERRLA  Nose:  Nares normal. No drainage or sinus tenderness. Throat:    Lips, mucosa, and tongue normal.  No Thrush  Neck:  Supple, symmetrical,  no adenopathy, thyroid: non tender    no carotid bruit and no JVD. Back:    Symmetric,  No CVA tenderness. Lungs:   Clear to auscultation bilaterally. No Wheezing or Rhonchi. No rales. Chest wall:  No tenderness or deformity. No Accessory muscle use. Heart:   Regular rate and rhythm,  no murmur, rub or gallop. Abdomen:   Soft, non-tender. Not distended.   Bowel sounds normal. No masses        Lab Data Reviewed:    Recent Labs     06/11/19  0229 06/10/19  1030   WBC 11.3* 16.7*   HGB 11.0* 12.4   HCT 31.0* 40.0    269     Recent Labs     06/11/19  0229 06/10/19  2215 06/10/19  1804 06/10/19  1352 06/10/19  1030    141 140 136 127*   K 3.2* 2.9* 3.0* 4.4 5.9*   * 107 107 101 93*   CO2 26 19* 9* <5* <5*   * 285* 527* 766* 904*   BUN 27* 29* 35* 36* 34*   CREA 1.20* 1.37* 1.68* 1.83* 1.76*   CA 7.8* 7.9* 7.2* 7.9* 8.9   MG 1.8 2.0  --  2.5*  --    PHOS 0.8*  --   --  8.5*  --    ALB  --   --   --   --  3.5   TBILI  --   --   --   --  0.5   SGOT  --   --   --   --  33   ALT  --   --   --   --  32     Lab Results   Component Value Date/Time    Glucose (POC) 95 06/11/2019 07:51 AM    Glucose (POC) 86 06/11/2019 06:46 AM    Glucose (POC) 118 (H) 06/11/2019 05:51 AM    Glucose (POC) 82 06/11/2019 04:46 AM    Glucose (POC) 97 06/11/2019 03:42 AM       ___________________________________________________  ___________________________________________________    Attending Physician: Keena Key MD

## 2019-06-11 NOTE — INTERDISCIPLINARY ROUNDS
Interdisciplinary team rounds were held 6/11/2019 with the following team members:Care Management, Diabetes Treatment Specialist, Nursing, Nutrition, Pharmacy, Physician and Respiratory Therapy. Plan of care discussed. See clinical pathway and/or care plan for interventions and desired outcomes.

## 2019-06-11 NOTE — DIABETES MGMT
[facility]    [facility] Progress Note    Recommendations/ Comments: Please consider resuming prandial humalog if pt is eating at least 50% of her meal.  Noted insulin gtt off this am, lantus started. Current hospital DM medication: lantus 22 units every 24 hours, humalog correction    Chart reviewed on Sima Larkin. Patient is a 69 y.o. female with known Type 2 Diabetes on tresiba 20 units daily, novolog 2 units ac tid at home. A1c:   Lab Results   Component Value Date/Time    Hemoglobin A1c 13.6 (H) 05/28/2019 05:25 PM    Hemoglobin A1c 12.2 (H) 02/12/2019 05:08 PM       Recent Glucose Results:   Lab Results   Component Value Date/Time     (H) 06/11/2019 08:05 AM     (H) 06/11/2019 02:29 AM     (H) 06/10/2019 10:15 PM    GLUCPOC 191 (H) 06/11/2019 12:24 PM    GLUCPOC 231 (H) 06/11/2019 12:22 PM    GLUCPOC 137 (H) 06/11/2019 11:04 AM        Lab Results   Component Value Date/Time    Creatinine 1.10 (H) 06/11/2019 08:05 AM     Estimated Creatinine Clearance: 43.6 mL/min (A) (based on SCr of 1.1 mg/dL (H)). Active Orders   Diet    DIET DIABETIC CONSISTENT CARB Regular; 2 GM NA (House Low NA)        PO intake: No data found. Will continue to follow as needed.     Thank you  ÓSCAR Krishna, RN, [address]          Time spent: 5 min

## 2019-06-11 NOTE — PROGRESS NOTES
Spiritual Care Assessment/Progress Note  [facility]      NAME: Jan Santos      MRN: [id number]  AGE: 69 y.o. SEX: female  [place]ahai Affiliation: Restorationist   Language: English     6/11/2019     Total Time (in minutes): 6     Spiritual Assessment begun in [facility] CRITICAL CARE 2 through conversation with:         []Patient        [] Family    [] Friend(s)        Reason for Consult: Initial/Spiritual assessment, critical care     Spiritual beliefs: (Please include comment if needed)     [] Identifies with a erlinda tradition:         [] Supported by a erlinda community:            [] Claims no spiritual orientation:           [] Seeking spiritual identity:                [] Adheres to an individual form of spirituality:           [x] Not able to assess:                           Identified resources for coping:      [] Prayer                               [] Music                  [] Guided Imagery     [] Family/friends                 [] Pet visits     [] Devotional reading                         [x] Unknown     [] Other:                                              Interventions offered during this visit: (See comments for more details)    Patient Interventions: Other (comment)(Pastoral Care Note)           Plan of Care:     [] Support spiritual and/or cultural needs    [] Support AMD and/or advance care planning process      [] Support grieving process   [] Coordinate Rites and/or Rituals    [] Coordination with community clergy   [] No spiritual needs identified at this time   [] Detailed Plan of Care below (See Comments)  [] Make referral to Music Therapy  [] Make referral to Pet Therapy     [] Make referral to Addiction services  [] Make referral to [facility]  [] Make referral to Spiritual Care Partner  [] No future visits requested        [x] Follow up visits as needed     Comments: Attempted Initial Spiritual Assessment in Critical Care Unit. Unable to assess patients needs.   No family present at this time. Left Pastoral Care note. Chaplains will follow as able and/or as needed. RADHA Arita [address] Paging Service [phone])

## 2019-06-11 NOTE — PROGRESS NOTES
Bedside and Verbal shift change report given to Loulou Palacio (oncoming nurse) by Luciano Costello (offgoing nurse). Report included the following information SBAR, ED Summary, MAR, Recent Results and Cardiac Rhythm sinus tachy. 2330 Pt BG less than 250 on stablizer, contacted tele hospitalist to update orders to add D 5 to fluids, also update on pt K of 2.9 and troponin of 1.86, had to call IT department for Tele hospitalist service at  as system did not recognize pt information and they forwarded message to hospitalist service. 9546 Dr Era Booker returned call, new orders received. Matt 68 by Kisha Allen in the lab for critical lab value. Phosphorus of 0.8.      0352 contacted tele hospitalist to update MD on critical lab value also update on pt K of 3.2 and troponin of 3.25, had to call IT department for Tele hospitalist service at  as system did not recognize pt information and they forwarded message to hospitalist service. 2195 Dr Era Booker returned call, RN updated MD and new orders placed. 0420 baseline PTT drawn     0434 EKG performed    0457 start heparin drip per [facility]    0536 AJ from lab called to inform that he is unable to get restults for PTT lab drawn, tech questioned if drawn form line, RN informed that it was drawn with a butterfly before heparin drip was started. Called Efren in pharmacy to inform, he stated would wait till next PTT is due.    0720 Bedside and Verbal shift change report given to Maria Del Carmen Mtz (oncoming nurse) by Loulou Palacio (offgoing nurse). Report included the following information SBAR, ED Summary, Intake/Output, MAR, Recent Results and Cardiac Rhythm sinus.

## 2019-06-11 NOTE — PROGRESS NOTES
**Consult Information**  Member Facility: [facility] Laura Lundberg MRN: [id number]  Consult ID: [id number]  Facility Time Zone: ET  Date and Time of Consult: 06/11/2019 03:56:31 AM  Requesting Clinician: Jennifer Daniel  Patient Name: Florentino Draper  Date of Birth: [id number]  Gender: Female    **Clinical Note**  Clinical Note: I was informed about the hypokalemia and hypophosphotemia as well as elevated troponin; Patient was admitted with sepsis and DKA. Will start her on heparin infusion; repeat EKG; replace K and Phos intravenously. Cardiology consultation. She is asymptomatic.

## 2019-06-12 DIAGNOSIS — E10.65 HYPERGLYCEMIA DUE TO TYPE 1 DIABETES MELLITUS (HCC): Primary | ICD-10-CM

## 2019-06-12 LAB
ANION GAP SERPL CALC-SCNC: 6 MMOL/L (ref 5–15)
ANION GAP SERPL CALC-SCNC: 7 MMOL/L (ref 5–15)
ANION GAP SERPL CALC-SCNC: 8 MMOL/L (ref 5–15)
APTT PPP: 103.5 SEC (ref 22.1–32)
APTT PPP: 47.8 SEC (ref 22.1–32)
APTT PPP: 51.3 SEC (ref 22.1–32)
APTT PPP: 69 SEC (ref 22.1–32)
BUN SERPL-MCNC: 16 MG/DL (ref 6–20)
BUN SERPL-MCNC: 17 MG/DL (ref 6–20)
BUN SERPL-MCNC: 19 MG/DL (ref 6–20)
BUN/CREAT SERPL: 17 (ref 12–20)
BUN/CREAT SERPL: 20 (ref 12–20)
BUN/CREAT SERPL: 22 (ref 12–20)
CALCIUM SERPL-MCNC: 7.2 MG/DL (ref 8.5–10.1)
CALCIUM SERPL-MCNC: 7.2 MG/DL (ref 8.5–10.1)
CALCIUM SERPL-MCNC: 7.6 MG/DL (ref 8.5–10.1)
CHLORIDE SERPL-SCNC: 107 MMOL/L (ref 97–108)
CHLORIDE SERPL-SCNC: 107 MMOL/L (ref 97–108)
CHLORIDE SERPL-SCNC: 109 MMOL/L (ref 97–108)
CO2 SERPL-SCNC: 21 MMOL/L (ref 21–32)
CO2 SERPL-SCNC: 23 MMOL/L (ref 21–32)
CO2 SERPL-SCNC: 24 MMOL/L (ref 21–32)
CREAT SERPL-MCNC: 0.86 MG/DL (ref 0.55–1.02)
CREAT SERPL-MCNC: 0.87 MG/DL (ref 0.55–1.02)
CREAT SERPL-MCNC: 0.92 MG/DL (ref 0.55–1.02)
GLUCOSE BLD STRIP.AUTO-MCNC: 102 MG/DL (ref 65–100)
GLUCOSE BLD STRIP.AUTO-MCNC: 130 MG/DL (ref 65–100)
GLUCOSE BLD STRIP.AUTO-MCNC: 184 MG/DL (ref 65–100)
GLUCOSE BLD STRIP.AUTO-MCNC: 212 MG/DL (ref 65–100)
GLUCOSE SERPL-MCNC: 107 MG/DL (ref 65–100)
GLUCOSE SERPL-MCNC: 215 MG/DL (ref 65–100)
GLUCOSE SERPL-MCNC: 272 MG/DL (ref 65–100)
POTASSIUM SERPL-SCNC: 3.6 MMOL/L (ref 3.5–5.1)
POTASSIUM SERPL-SCNC: 3.7 MMOL/L (ref 3.5–5.1)
POTASSIUM SERPL-SCNC: 3.7 MMOL/L (ref 3.5–5.1)
SERVICE CMNT-IMP: ABNORMAL
SODIUM SERPL-SCNC: 136 MMOL/L (ref 136–145)
SODIUM SERPL-SCNC: 138 MMOL/L (ref 136–145)
SODIUM SERPL-SCNC: 138 MMOL/L (ref 136–145)
THERAPEUTIC RANGE,PTTT: ABNORMAL SECS (ref 58–77)

## 2019-06-12 PROCEDURE — 80048 BASIC METABOLIC PNL TOTAL CA: CPT

## 2019-06-12 PROCEDURE — 74011250637 HC RX REV CODE- 250/637: Performed by: INTERNAL MEDICINE

## 2019-06-12 PROCEDURE — 97116 GAIT TRAINING THERAPY: CPT

## 2019-06-12 PROCEDURE — 82962 GLUCOSE BLOOD TEST: CPT

## 2019-06-12 PROCEDURE — 36415 COLL VENOUS BLD VENIPUNCTURE: CPT

## 2019-06-12 PROCEDURE — 74011250636 HC RX REV CODE- 250/636: Performed by: HOSPITALIST

## 2019-06-12 PROCEDURE — 97535 SELF CARE MNGMENT TRAINING: CPT | Performed by: OCCUPATIONAL THERAPIST

## 2019-06-12 PROCEDURE — 97165 OT EVAL LOW COMPLEX 30 MIN: CPT | Performed by: OCCUPATIONAL THERAPIST

## 2019-06-12 PROCEDURE — 74011250637 HC RX REV CODE- 250/637: Performed by: NURSE PRACTITIONER

## 2019-06-12 PROCEDURE — 65660000000 HC RM CCU STEPDOWN

## 2019-06-12 PROCEDURE — 97161 PT EVAL LOW COMPLEX 20 MIN: CPT

## 2019-06-12 PROCEDURE — 74011636637 HC RX REV CODE- 636/637: Performed by: INTERNAL MEDICINE

## 2019-06-12 PROCEDURE — 85730 THROMBOPLASTIN TIME PARTIAL: CPT

## 2019-06-12 PROCEDURE — 94760 N-INVAS EAR/PLS OXIMETRY 1: CPT

## 2019-06-12 RX ORDER — INSULIN LISPRO 100 [IU]/ML
3 INJECTION, SOLUTION INTRAVENOUS; SUBCUTANEOUS
Status: DISCONTINUED | OUTPATIENT
Start: 2019-06-12 | End: 2019-06-15 | Stop reason: HOSPADM

## 2019-06-12 RX ADMIN — INSULIN GLARGINE 22 UNITS: 100 INJECTION, SOLUTION SUBCUTANEOUS at 12:45

## 2019-06-12 RX ADMIN — ASPIRIN 81 MG: 81 TABLET ORAL at 09:35

## 2019-06-12 RX ADMIN — BRIMONIDINE TARTRATE 1 DROP: 2 SOLUTION OPHTHALMIC at 11:28

## 2019-06-12 RX ADMIN — INSULIN LISPRO 3 UNITS: 100 INJECTION, SOLUTION INTRAVENOUS; SUBCUTANEOUS at 09:34

## 2019-06-12 RX ADMIN — LEVOTHYROXINE SODIUM 150 MCG: 150 TABLET ORAL at 05:57

## 2019-06-12 RX ADMIN — INSULIN LISPRO 3 UNITS: 100 INJECTION, SOLUTION INTRAVENOUS; SUBCUTANEOUS at 17:12

## 2019-06-12 RX ADMIN — Medication 10 ML: at 21:41

## 2019-06-12 RX ADMIN — CLOPIDOGREL BISULFATE 75 MG: 75 TABLET, FILM COATED ORAL at 09:34

## 2019-06-12 RX ADMIN — HEPARIN SODIUM 2000 UNITS: 5000 INJECTION INTRAVENOUS; SUBCUTANEOUS at 12:43

## 2019-06-12 RX ADMIN — Medication 10 ML: at 05:57

## 2019-06-12 RX ADMIN — INSULIN LISPRO 3 UNITS: 100 INJECTION, SOLUTION INTRAVENOUS; SUBCUTANEOUS at 11:30

## 2019-06-12 RX ADMIN — METOPROLOL TARTRATE 12.5 MG: 25 TABLET ORAL at 09:35

## 2019-06-12 RX ADMIN — BRIMONIDINE TARTRATE 1 DROP: 2 SOLUTION OPHTHALMIC at 21:41

## 2019-06-12 RX ADMIN — Medication 10 ML: at 17:13

## 2019-06-12 RX ADMIN — ATORVASTATIN CALCIUM 20 MG: 20 TABLET, FILM COATED ORAL at 21:40

## 2019-06-12 RX ADMIN — METOPROLOL TARTRATE 12.5 MG: 25 TABLET ORAL at 21:40

## 2019-06-12 NOTE — PROGRESS NOTES
[address] [place] [id number]      Cardiology Progress Note      6/12/2019 8:18 AM    Admit Date: 6/10/2019    Admit Diagnosis:   DKA (diabetic ketoacidoses) ([facility] [E13.10]  Acute encephalopathy [G93.40]  SIRS (systemic inflammatory response syndrome) ([facility] [R65.10]  ONEYDA (acute kidney injury) ([facility] [N17.9]  Lactic acidosis [E87.2]    Subjective:     Cristina Benitez has no complaints. Alert, overall oriented, but some confusion. The patient admits to missing some meds because she forgets. But states compliance. ..       Visit Vitals  /69 (BP 1 Location: Right arm, BP Patient Position: At rest)   Pulse 78   Temp 98.5 °F (36.9 °C)   Resp 18   Ht 5' 5\" (1.651 m)   Wt 75.8 kg (167 lb)   SpO2 98%   BMI 27.79 kg/m²       Current Facility-Administered Medications   Medication Dose Route Frequency    alcohol 62% (NOZIN) nasal  1 Ampule  1 Ampule Topical Q12H    heparin 25,000 units in D5W 250 ml infusion  12-25 Units/kg/hr IntraVENous TITRATE    heparin (porcine) injection 2,000 Units  2,000 Units IntraVENous PRN    Or    heparin (porcine) injection 4,000 Units  4,000 Units IntraVENous PRN    levothyroxine (SYNTHROID) tablet 150 mcg  150 mcg Oral 6am    clopidogrel (PLAVIX) tablet 75 mg  75 mg Oral DAILY    insulin lispro (HUMALOG) injection   SubCUTAneous AC&HS    glucose chewable tablet 16 g  4 Tab Oral PRN    glucagon (GLUCAGEN) injection 1 mg  1 mg IntraMUSCular PRN    0.45% sodium chloride infusion  50 mL/hr IntraVENous CONTINUOUS    insulin glargine (LANTUS) injection 22 Units  22 Units SubCUTAneous Q24H    aspirin delayed-release tablet 81 mg  81 mg Oral DAILY    metoprolol tartrate (LOPRESSOR) tablet 12.5 mg  12.5 mg Oral Q12H    atorvastatin (LIPITOR) tablet 20 mg  20 mg Oral QHS    glucose chewable tablet 16 g  4 Tab Oral PRN    glucagon (GLUCAGEN) injection 1 mg  1 mg IntraMUSCular PRN    dextrose 10% infusion 100 mL  100 mL IntraVENous PRN    sodium chloride (NS) flush 5-40 mL  5-40 mL IntraVENous Q8H    sodium chloride (NS) flush 5-40 mL  5-40 mL IntraVENous PRN    ondansetron (ZOFRAN) injection 4 mg  4 mg IntraVENous Q6H PRN    brimonidine (ALPHAGAN) 0.2 % ophthalmic solution 1 Drop  1 Drop Both Eyes BID       Objective:      Physical Exam:  General Appearance:  eldelry AAF in no acute distress  Chest:   Clear  Cardiovascular:  Regular rate and rhythm, no murmur. Abdomen:   Soft, non-tender, bowel sounds are active. Extremities: no peripheral edema  Skin:  Warm and dry. Data Review:   Recent Labs     06/11/19  0229 06/10/19  1030   WBC 11.3* 16.7*   HGB 11.0* 12.4   HCT 31.0* 40.0    269     Recent Labs     06/12/19  0132 06/11/19  0805 06/11/19  0229 06/10/19  2215  06/10/19  1352  06/10/19  1030    142 144 141   < > 136  --  127*   K 3.7 4.3 3.2* 2.9*   < > 4.4  --  5.9*   * 112* 110* 107   < > 101  --  93*   CO2 23 25 26 19*   < > <5*  --  <5*   * 104* 103* 285*   < > 766*  --  904*   BUN 19 27* 27* 29*   < > 36*  --  34*   CREA 0.87 1.10* 1.20* 1.37*   < > 1.83*  --  1.76*   CA 7.2* 7.4* 7.8* 7.9*   < > 7.9*  --  8.9   MG  --  1.7 1.8 2.0  --  2.5*   < >  --    PHOS  --  2.4* 0.8*  --   --  8.5*  --   --    ALB  --   --   --   --   --   --   --  3.5   TBILI  --   --   --   --   --   --   --  0.5   SGOT  --   --   --   --   --   --   --  33   ALT  --   --   --   --   --   --   --  32    < > = values in this interval not displayed.        Recent Labs     06/11/19  0805 06/11/19  0229 06/10/19  2215 06/10/19  1804   TROIQ 3.56* 3.25* 1.86* 0.22*   CPK  --   --   --  139   CKMB  --   --   --  9.4*         Intake/Output Summary (Last 24 hours) at 6/12/2019 0818  Last data filed at 6/12/2019 0546  Gross per 24 hour   Intake 1350.61 ml   Output 350 ml   Net 1000.61 ml        Telemetry: SR    Assessment:     Principal Problem:    DKA (diabetic ketoacidoses) ([facility] (6/2/2017)    Active Problems:    Vascular dementia without behavioral disturbance (10/20/2018)      Elevated troponin (6/11/2019)      Hypertension (2/6/2015)      Lactic acidosis (6/10/2019)      SIRS (systemic inflammatory response syndrome) ([facility] (6/10/2019)      ONEYDA (acute kidney injury) ([facility] (6/10/2019)      Acute encephalopathy (6/10/2019)        Plan:     Elevated troponin/NSTEMI Type II:  No obvious s/s anginal equivalent, but with the significant rise of troponin will proceed with a nuclear lexiscan stress study tomorrow. Will discuss with family members further management and that she will need assistance in taking her medications as scheduled - the  Continue ASA 81mg daily, metoprolol 12.5mg BID, and lipitor. Patient is on Heparin IV drip. Echo pending  Further discussion with patient and family reference her compliance. Specifically if patient were to receive a cardiac cath and require a REJI, which would be necessary for the patient to take an antiplatelet medication routinely.     DKA:  Per hospitalist, resolving     ONEYDA:  Improving with hydration       Iam Villasenor ACNP  Cardiology    Patient seen and examined by me with nurse practitioner Iam Villasenor. I personally performed all components of the history, physical, and medical decision making and agree with the assessment and plan with minor modifications as noted. I discussed the patient's cardiac situation with her daughter Yves Barrientos. I discussed whether they would want to pursue aggressive ischemic evaluation or whether they would just want to treat medically. She is going to think about it and discuss with her brothers, and will call back in the next day or 2 to have us paged. If there is any invasive procedure, she does not want it done while she is out of town and she is out of town through Friday night.

## 2019-06-12 NOTE — DIABETES MGMT
[facility]    Elevated A1C Visit Note    Recommendations/ Comments: will follow and continue to support pt and family as able. Current hospital DM medications:  Lantus 22 units, Lispro 3 units ac tid. Patient is a 69 y.o. female with known  Type 1 Diabetes on Tresiba 20 units and Novolog 3 units at home. She is honest and shared that her memory is not good - she was not aware that she cannot miss her Seldon Leaver and admits to missing a dose the day before she arrived in the ER. Previous admissions: October 2018, November 2018 and yesterday    A1c:   Lab Results   Component Value Date/Time    Hemoglobin A1c 13.6 (H) 05/28/2019 05:25 PM    Hemoglobin A1c 12.2 (H) 02/12/2019 05:08 PM       Recent Glucose Results:   Lab Results   Component Value Date/Time     (H) 06/12/2019 11:02 AM     (H) 06/12/2019 01:32 AM    GLUCPOC 212 (H) 06/12/2019 11:31 AM    GLUCPOC 102 (H) 06/12/2019 08:14 AM    GLUCPOC 105 (H) 06/11/2019 09:45 PM        Lab Results   Component Value Date/Time    Creatinine 0.86 06/12/2019 11:02 AM       Active Orders   Diet    DIET DIABETIC CONSISTENT CARB Regular; 2 GM NA (House Low NA)          Assessed and instructed patient on the following:   ·  site rotation, use of insulin pen, self-injection of insulin and differences in her insulin pens - provided written descriptions of her insulin pens, how they work, when to dose and to never miss her Seldon Leaver. Instructed to use priming 2 units to ensure insulin pen needles are working. She is storing her insulin correctly. She is also using appropriate sharps disposal.   · Pt shared that she feels her food choices and portions may be bigger that she needs. She is mostly snacking on fruit and limiting sweets. Encouraged routine meals and not to skip. · She shared that she is often skipping her SBGM and needs to do better with this so she has BG values for her testing.      Provided patient with the following: [] Diabetes Self-Care Guide               [x]          Insulin education materials               []          Diabetes survival skills handout               []          BG guidelines for post-op patients               []          \"Decreasing  the Cost of Diabetes Care\"               [x]          Outpatient DTC contact number          Patient to follow up with Asa Chase and encouraged education with her daughter on the outpatient offices if they are able after discharge. Will continue to follow as needed. Thank you.    [address]    Time spent: 35 minutes

## 2019-06-12 NOTE — PROGRESS NOTES
Occupational Therapy  OT consult received, chart reviewed. Patient was seen this afternoon for OT evaluation. Recommend home with home health and increased supervision/assistance upon discharge. Full evaluation note to follow.

## 2019-06-12 NOTE — PROGRESS NOTES
Problem: Self Care Deficits Care Plan (Adult)  Goal: *Acute Goals and Plan of Care (Insert Text)  Description  Occupational Therapy Goals  Initiated 6/12/2019  1. Patient will perform grooming standing at the sink with modified independence within 7 day(s). 2.  Patient will perform bathing with set-up/supervision only within 7 day(s). 3.  Patient will perform lower body dressing with modified independence within 7 day(s). 4.  Patient will perform toilet transfers with modified independence within 7 day(s). 5.  Patient will perform all aspects of toileting with modified independence within 7 day(s). 6.  Patient will utilize energy conservation techniques during ADL with Min cues within 7 day(s). Outcome: Progressing Towards Goal    OCCUPATIONAL THERAPY EVALUATION  Patient: Sallie Parr (66 y.o. female)  Date: 6/12/2019  Primary Diagnosis: DKA (diabetic ketoacidoses) ([facility] [E13.10]  Acute encephalopathy [G93.40]  SIRS (systemic inflammatory response syndrome) ([facility]) [R65.10]  ONEYDA (acute kidney injury) ([facility] [N17.9]  Lactic acidosis [E87.2]        Precautions:        ASSESSMENT :  Based on the objective data described below, the patient presents with deficits in self-care, primarily due to decreased activity tolerance, decreased dynamic balance, decreased short term memory, decreased insight into deficits, impaired judgement, and general weakness. She is functioning below her baseline independent level and will benefit from skilled OT treatment to maximize independence and safety in ADL. Patient will benefit from skilled intervention to address the above impairments. Patient?s rehabilitation potential is considered to be Good  Factors which may influence rehabilitation potential include:   ? None noted  ? Mental ability/status  ? Medical condition  ? Home/family situation and support systems  ? Safety awareness  ?              Pain tolerance/management  ? Other:      PLAN :  Recommendations and Planned Interventions:  ?               Self Care Training                  ? Therapeutic Activities  ? Functional Mobility Training    ? Cognitive Retraining  ? Therapeutic Exercises           ? Endurance Activities  ? Balance Training                   ? Neuromuscular Re-Education  ? Visual/Perceptual Training     ? Home Safety Training  ? Patient Education                 ? Family Training/Education  ? Other (comment):    Frequency/Duration: Patient will be followed by occupational therapy 3 times a week to address goals. Discharge Recommendations: Home Health and increased supervision/assist from family  Further Equipment Recommendations for Discharge: None anticipated      SUBJECTIVE:   Patient stated ? Ewww, I smell wee wee. ? (as she was getting out of the bed; patient was incontinent of urine)    OBJECTIVE DATA SUMMARY:   HISTORY:   Past Medical History:   Diagnosis Date    Diabetes ([facility]     Heart failure ([facility]     unknown to family    Hypercholesteremia     Hypertension     Stroke [facility])     Thyroid disease      Past Surgical History:   Procedure Laterality Date    HX GYN      HX HEENT      thyroidectomy    HX HYSTERECTOMY      REMOVAL GALLBLADDER      THYROIDECTOMY         Prior Level of Function/Environment/Context: Lives with \"special\" son, but he drives and works per patient report. She indicates she was independent with ADL and IADL, including driving. Patient has 3 sons and 1 daughter.    Expanded or extensive additional review of patient history:     Home Situation  Home Environment: Private residence  # Steps to Enter: 1  Rails to Enter: No  One/Two Story Residence: Two story  # of Interior Steps: 12  Interior Rails: Left  Lift Chair Available: No  Living Alone: No  Support Systems: Child(oumar), Family member(s)  Patient Expects to be Discharged to[de-identified] Private residence  Current DME Used/Available at Home: Glucometer  Tub or Shower Type: Tub/Shower combination    Hand dominance: Right    EXAMINATION OF PERFORMANCE DEFICITS:  Cognitive/Behavioral Status:  Neurologic State: Alert  Orientation Level: Disoriented to time;Oriented to person;Oriented to place;Oriented to situation  Cognition: Appropriate for age attention/concentration; Follows commands; Impaired decision making;Memory loss  Perception: Appears intact  Perseveration: No perseveration noted  Safety/Judgement: Awareness of environment;Decreased awareness of need for safety    Hearing: Auditory  Auditory Impairment: None    Vision/Perceptual:    Grossly WFL; wears glasses, but they are not here at the hospital                                Range of Motion:  AROM: Within functional limits                         Strength:  Strength: Generally decreased, functional                Coordination:  Coordination: Within functional limits            Tone & Sensation:  Tone: Normal                         Balance:  Sitting: Intact  Standing: Impaired; Without support  Standing - Static: Good  Standing - Dynamic : Fair    Functional Mobility and Transfers for ADLs:  Bed Mobility:  Rolling: Independent  Supine to Sit: Independent  Scooting: Independent    Transfers:  Sit to Stand: Stand-by assistance  Stand to Sit: Stand-by assistance  Bed to Chair: Contact guard assistance  Bathroom Mobility: Contact guard assistance  Toilet Transfer : Stand-by assistance    ADL Assessment:  Feeding: Independent    Oral Facial Hygiene/Grooming: Stand-by assistance    Bathing: Contact guard assistance    Upper Body Dressing: Supervision    Lower Body Dressing: Minimum assistance    Toileting: Contact guard assistance                ADL Intervention and task modifications:  Patient educated on safety and fall prevention. Discussed the importance of being out of bed as much as possible. She had a purewick in place upon arrival, but indicates she is able to tell when she needs to urinate. Encouraged her to call for nursing assistance to get up and go into the bathroom, or at the very lease, to use the [facility]. Patient was upset from the smell of urine when she got up. Stood at the sink to wash up with CGA and cues. Cognitive Retraining  Safety/Judgement: Awareness of environment;Decreased awareness of need for safety    Functional Measure:  Barthel Index:    Bathin  Bladder: 5  Bowels: 10  Groomin  Dressin  Feeding: 10  Mobility: 10  Stairs: 0  Toilet Use: 5  Transfer (Bed to Chair and Back): 10  Total: 60/100        Percentage of impairment   0%   1-19%   20-39%   40-59%   60-79%   80-99%   100%   Barthel Score 0-100 100 99-80 79-60 59-40 20-39 1-19   0     The Barthel ADL Index: Guidelines  1. The index should be used as a record of what a patient does, not as a record of what a patient could do. 2. The main aim is to establish degree of independence from any help, physical or verbal, however minor and for whatever reason. 3. The need for supervision renders the patient not independent. 4. A patient's performance should be established using the best available evidence. Asking the patient, friends/relatives and nurses are the usual sources, but direct observation and common sense are also important. However direct testing is not needed. 5. Usually the patient's performance over the preceding 24-48 hours is important, but occasionally longer periods will be relevant. 6. Middle categories imply that the patient supplies over 50 per cent of the effort. 7. Use of aids to be independent is allowed. Earnestine Landry., Barthel, DEliW. ([id number]). Functional evaluation: the Barthel Index. [address] (14)2. LINDA Overton, Gilberto Lund., Daniel Hale, [address] [place] [address] Ave ().  Measuring the change indisability after inpatient rehabilitation; comparison of the responsiveness of the Barthel Index and Functional [place] Measure. Journal of Neurology, Neurosurgery, and Psychiatry, 66(4), 196-089. GENTRY Pleitez, BRENNA Mckay, & Ryne Baumann M.A. (2004.) Assessment of post-stroke quality of life in cost-effectiveness studies: The usefulness of the Barthel Index and the EuroQoL-5D. Quality of Life Research, 15, 938-46        Occupational Therapy Evaluation Charge Determination   History Examination Decision-Making   LOW Complexity : Brief history review  MEDIUM Complexity : 3-5 performance deficits relating to physical, cognitive , or psychosocial skils that result in activity limitations and / or participation restrictions LOW Complexity : No comorbidities that affect functional and no verbal or physical assistance needed to complete eval tasks       Based on the above components, the patient evaluation is determined to be of the following complexity level: LOW   Pain:  Pain Scale 1: Numeric (0 - 10)  Pain Intensity 1: 0              Activity Tolerance:   Fair  Please refer to the flowsheet for vital signs taken during this treatment. After treatment:   ? Patient left in no apparent distress sitting up in chair  ? Patient left in no apparent distress in bed  ? Call bell left within reach  ? Nursing notified  ? Caregiver present  ? Bed alarm activated    COMMUNICATION/EDUCATION:   The patient?s plan of care was discussed with: Physical Therapist and Registered Nurse.  ? Home safety education was provided and the patient/caregiver indicated understanding. ? Patient/family have participated as able in goal setting and plan of care. ? Patient/family agree to work toward stated goals and plan of care. ? Patient understands intent and goals of therapy, but is neutral about his/her participation. ? Patient is unable to participate in goal setting and plan of care. This patient?s plan of care is appropriate for delegation to [facility].     Thank you for this referral.  Chase Hernandez Jani OTR/L  Time Calculation: 22 mins

## 2019-06-12 NOTE — PROGRESS NOTES
Bedside shift change report given to Jaylan Samson (oncoming nurse) by Nicolle Gordon (offgoing nurse). Report included the following information SBAR, Kardex, Intake/Output, MAR and Recent Results.

## 2019-06-12 NOTE — PROGRESS NOTES
General Daily Progress Note    Admit Date: 6/10/2019    Subjective:     Patient has no complaint . Current Facility-Administered Medications   Medication Dose Route Frequency    insulin lispro (HUMALOG) injection 3 Units  3 Units SubCUTAneous TIDAC    alcohol 62% (NOZIN) nasal  1 Ampule  1 Ampule Topical Q12H    heparin 25,000 units in D5W 250 ml infusion  12-25 Units/kg/hr IntraVENous TITRATE    heparin (porcine) injection 2,000 Units  2,000 Units IntraVENous PRN    Or    heparin (porcine) injection 4,000 Units  4,000 Units IntraVENous PRN    levothyroxine (SYNTHROID) tablet 150 mcg  150 mcg Oral 6am    clopidogrel (PLAVIX) tablet 75 mg  75 mg Oral DAILY    glucose chewable tablet 16 g  4 Tab Oral PRN    glucagon (GLUCAGEN) injection 1 mg  1 mg IntraMUSCular PRN    insulin glargine (LANTUS) injection 22 Units  22 Units SubCUTAneous Q24H    aspirin delayed-release tablet 81 mg  81 mg Oral DAILY    metoprolol tartrate (LOPRESSOR) tablet 12.5 mg  12.5 mg Oral Q12H    atorvastatin (LIPITOR) tablet 20 mg  20 mg Oral QHS    glucose chewable tablet 16 g  4 Tab Oral PRN    glucagon (GLUCAGEN) injection 1 mg  1 mg IntraMUSCular PRN    dextrose 10% infusion 100 mL  100 mL IntraVENous PRN    sodium chloride (NS) flush 5-40 mL  5-40 mL IntraVENous Q8H    sodium chloride (NS) flush 5-40 mL  5-40 mL IntraVENous PRN    ondansetron (ZOFRAN) injection 4 mg  4 mg IntraVENous Q6H PRN    brimonidine (ALPHAGAN) 0.2 % ophthalmic solution 1 Drop  1 Drop Both Eyes BID        Review of Systems  A comprehensive review of systems was negative.     Objective:     Patient Vitals for the past 24 hrs:   BP Temp Pulse Resp SpO2 Height Weight   06/12/19 0736 145/69 98.5 °F (36.9 °C) 78 18 98 %     06/12/19 0409 118/52 98.2 °F (36.8 °C) 85 17 99 %     06/12/19 0002 117/44 98.3 °F (36.8 °C) 81 16 98 %     06/11/19 1948 121/55 98.3 °F (36.8 °C) 87 18 98 %     06/11/19 1900 (!) 106/32  86 16 100 %     06/11/19 1800 104/45  86 19 100 %     06/11/19 1700 106/59  86 24 100 %     06/11/19 1605 101/51 98.1 °F (36.7 °C) 80 19 99 %     06/11/19 1500 110/61  91 24 100 %     06/11/19 1400 122/77  96 27 99 %     06/11/19 1300 129/50 98.3 °F (36.8 °C) 89 26 100 %     06/11/19 1237 109/47     5' 5\" (1.651 m) 167 lb (75.8 kg)   06/11/19 1200 117/48  90 26 100 %     06/11/19 1100 105/56  90 26 99 %     06/11/19 1000 109/47  88 26 98 %     06/11/19 0900 98/46  90 17 98 %       No intake/output data recorded. 06/10 1901 - 06/12 0700  In: 4886.3 [P.O.:600; I.V.:4286.3]  Out: 800 [Urine:800]    Physical Exam:   Visit Vitals  /69 (BP 1 Location: Right arm, BP Patient Position: At rest)   Pulse 78   Temp 98.5 °F (36.9 °C)   Resp 18   Ht 5' 5\" (1.651 m)   Wt 167 lb (75.8 kg)   SpO2 98%   BMI 27.79 kg/m²     General appearance: alert, cooperative, no distress, appears stated age  Neck: supple, symmetrical, trachea midline, no adenopathy, thyroid: not enlarged, symmetric, no tenderness/mass/nodules, no carotid bruit and no JVD  Lungs: clear to auscultation bilaterally  Heart: regular rate and rhythm, S1, S2 normal, no murmur, click, rub or gallop  Abdomen: soft, non-tender.  Bowel sounds normal. No masses,  no organomegaly  Extremities: extremities normal, atraumatic, no cyanosis or edema        Data Review   Recent Results (from the past 24 hour(s))   GLUCOSE, POC    Collection Time: 06/11/19  8:55 AM   Result Value Ref Range    Glucose (POC) 105 (H) 65 - 100 mg/dL    Performed by [facility], POC    Collection Time: 06/11/19  9:58 AM   Result Value Ref Range    Glucose (POC) 124 (H) 65 - 100 mg/dL    Performed by [facility]    GLUCOSE, POC    Collection Time: 06/11/19 11:04 AM   Result Value Ref Range    Glucose (POC) 137 (H) 65 - 100 mg/dL    Performed by [facility]    PTT    Collection Time: 06/11/19 12:16 PM   Result Value Ref Range    aPTT 28.4 22.1 - 32.0 sec    aPTT, therapeutic range     58.0 - 77.0 SECS   GLUCOSE, POC    Collection Time: 06/11/19 12:22 PM   Result Value Ref Range    Glucose (POC) 231 (H) 65 - 100 mg/dL    Performed by Debra Champion, POC    Collection Time: 06/11/19 12:24 PM   Result Value Ref Range    Glucose (POC) 191 (H) 65 - 100 mg/dL    Performed by Yumiko Morillo    ECHO ADULT COMPLETE    Collection Time: 06/11/19  2:10 PM   Result Value Ref Range    Aortic Valve Systolic Peak Velocity 078.98 cm/s    AoV VTI 39.46 cm    Aortic Valve Area by Continuity of Peak Velocity 2.0 cm2    Aortic Valve Area by Continuity of VTI 2.3 cm2    AoV PG 15.3 mmHg    LVIDd 4.03 3.9 - 5.3 cm    LVPWd 1.30 (A) 0.6 - 0.9 cm    LVIDs 2.25 cm    IVSd 1.34 (A) 0.6 - 0.9 cm    LVOT d 2.07 cm    LVOT Peak Velocity 118.41 cm/s    LVOT Peak Gradient 5.6 mmHg    LVOT VTI 27.03 cm    MVA (PHT) 2.7 cm2    MV A Jae 119.90 cm/s    MV E Jae 86.45 cm/s    MV E/A 0.72     Aortic Valve Systolic Mean Gradient 7.9 mmHg    LV Mass .6 (A) 67 - 162 g    LV Mass AL Index 124.2 (A) 43 - 95 g/m2    RVSP 31.4 mmHg    Est. RA Pressure 10.0 mmHg    Mitral Regurgitant Peak Velocity 373.63 cm/s    Mitral Valve Pressure Half-time 81.7 ms    Triscuspid Valve Regurgitation Peak Gradient 21.4 mmHg    Pulmonic Valve Max Velocity 75.06 cm/s    LVOT SV 90.9 ml    TR Max Velocity 231.50 cm/s    PASP 31.4 mmHg    MR Peak Gradient 55.8 mmHg    CATHERINE/BSA Pk Jae 1.1 cm2/m2    CATHERINE/BSA VTI 1.2 cm2/m2    PV peak gradient 2.3 mmHg   GLUCOSE, POC    Collection Time: 06/11/19  4:37 PM   Result Value Ref Range    Glucose (POC) 202 (H) 65 - 100 mg/dL    Performed by [facility]    PTT    Collection Time: 06/11/19  6:01 PM   Result Value Ref Range    aPTT 43.5 (H) 22.1 - 32.0 sec    aPTT, therapeutic range     58.0 - 77.0 SECS   GLUCOSE, POC    Collection Time: 06/11/19  9:45 PM   Result Value Ref Range    Glucose (POC) 105 (H) 65 - 100 mg/dL    Performed by [address] [place] [address] Collection Time: 06/12/19  1:32 AM   Result Value Ref Range    Sodium 138 136 - 145 mmol/L    Potassium 3.7 3.5 - 5.1 mmol/L    Chloride 109 (H) 97 - 108 mmol/L    CO2 23 21 - 32 mmol/L    Anion gap 6 5 - 15 mmol/L    Glucose 107 (H) 65 - 100 mg/dL    BUN 19 6 - 20 MG/DL    Creatinine 0.87 0.55 - 1.02 MG/DL    BUN/Creatinine ratio 22 (H) 12 - 20      GFR est AA >60 >60 ml/min/1.73m2    GFR est non-AA >60 >60 ml/min/1.73m2    Calcium 7.2 (L) 8.5 - 10.1 MG/DL   PTT    Collection Time: 06/12/19  1:32 AM   Result Value Ref Range    aPTT 103.5 (HH) 22.1 - 32.0 sec    aPTT, therapeutic range     58.0 - 77.0 SECS   PTT    Collection Time: 06/12/19  4:43 AM   Result Value Ref Range    aPTT 51.3 (H) 22.1 - 32.0 sec    aPTT, therapeutic range     58.0 - 77.0 SECS   GLUCOSE, POC    Collection Time: 06/12/19  8:14 AM   Result Value Ref Range    Glucose (POC) 102 (H) 65 - 100 mg/dL    Performed by Charlette Moore (PCT)            Assessment:     Principal Problem:    DKA (diabetic ketoacidoses) ([facility] (6/2/2017)    Active Problems:    Vascular dementia without behavioral disturbance (10/20/2018)      Elevated troponin (6/11/2019)      Hypertension (2/6/2015)      Lactic acidosis (6/10/2019)      SIRS (systemic inflammatory response syndrome) ([facility] (6/10/2019)      ONEYDA (acute kidney injury) ([facility] (6/10/2019)      Acute encephalopathy (6/10/2019)        Plan:     1. DKA has resolved. Basal and bolus insulin resumed. Compliance remains poor secondary to her progressive dementia. Plan continuous glucose monitoring as outpatient. 2.  Troponin levels increased and is  being actively addressed by cardiology. 3.  Hydration adequate.

## 2019-06-12 NOTE — PROGRESS NOTES
Problem: Mobility Impaired (Adult and Pediatric)  Goal: *Acute Goals and Plan of Care (Insert Text)  Description  Physical Therapy Goals  Initiated 6/12/2019  1. Patient will move from supine to sit and sit to supine , scoot up and down and roll side to side in bed with independence within 7 day(s). 2.  Patient will transfer from bed to chair and chair to bed with independence using the least restrictive device within 7 day(s). 3.  Patient will perform sit to stand with independence within 7 day(s). 4.  Patient will ambulate with independence for 300 feet with the least restrictive device within 7 day(s). 5.  Patient will ascend/descend 12 stairs with 1 handrail(s) with modified independence within 7 day(s). Outcome: Progressing Towards Goal   PHYSICAL THERAPY EVALUATION  Patient: Rome Inman (54 y.o. female)  Date: 6/12/2019  Primary Diagnosis: DKA (diabetic ketoacidoses) ([facility] [E13.10]  Acute encephalopathy [G93.40]  SIRS (systemic inflammatory response syndrome) ([facility]) [R65.10]  ONEYDA (acute kidney injury) ([facility] [N17.9]  Lactic acidosis [E87.2]        Precautions:        ASSESSMENT :  Based on the objective data described below, the patient presents with mild confusion/baseline dementia?, generalized weakness, impaired balance, decreased endurance/activity tolerance, and overall impaired functional mobility. Pt received supine in bed, A&O x3 (unable to state date), agreeable to participation with therapy. Pt required SB/CGAx1 during all OOB mobility including sit<>stand transfers and ambulation trial of 150ft. Pt with slow, shuffled gait w/ mild increase in trunk sway. No overt LOB however fair gait stability overall. Noted AGUILERA during ambulation trial however O2 sats 97% on RA, HR 90 bpm. At this time, pt is functioning below her baseline independent level and would benefit from additional skilled therapy to address the above mentioned deficits.  Anticipate pt will progress well w/ therapy and be appropriate to return home w/ [facility] and increased family support/assist/supervision. Patient will benefit from skilled intervention to address the above impairments. Patients rehabilitation potential is considered to be Good  Factors which may influence rehabilitation potential include:   ? None noted  ? Mental ability/status  ? Medical condition  ? Home/family situation and support systems  ? Safety awareness  ? Pain tolerance/management  ? Other:      PLAN :  Recommendations and Planned Interventions:  ?           Bed Mobility Training             ? Neuromuscular Re-Education  ? Transfer Training                   ? Orthotic/Prosthetic Training  ? Gait Training                         ? Modalities  ? Therapeutic Exercises           ? Edema Management/Control  ? Therapeutic Activities            ? Patient and Family Training/Education  ? Other (comment): stair climbing    Frequency/Duration: Patient will be followed by physical therapy  4 times a week to address goals. Discharge Recommendations: Home Health w/ increased family assist/supervision  Further Equipment Recommendations for Discharge: None      SUBJECTIVE:   Patient stated I'm used to being on the go.     OBJECTIVE DATA SUMMARY:   HISTORY:    Past Medical History:   Diagnosis Date    [facility]     Heart failure ([facility]     unknown to family    Hypercholesteremia     Hypertension     Stroke ([facility]     Thyroid disease      Past Surgical History:   Procedure Laterality Date    HX GYN      HX HEENT      thyroidectomy    HX HYSTERECTOMY      REMOVAL GALLBLADDER      THYROIDECTOMY       Prior Level of Function/Home Situation: Pt lives at home w/ son, states her son is \"special needs\" and that they \"help each other. \" Reports independence w/ ambulation and ADLs. Still driving. Does all cooking and cleaning at home. Dementia? Personal factors and/or comorbidities impacting plan of care: noncompliance/poor mgmt of insulin/DM    Home Situation  Home Environment: Private residence  # Steps to Enter: 1  Rails to Enter: No  One/Two Story Residence: Two story  # of Interior Steps: 12  Interior Rails: Left  Lift Chair Available: No  Living Alone: No  Support Systems: Child(oumar), Family member(s)  Patient Expects to be Discharged to[de-identified] Private residence  Current DME Used/Available at Home: Glucometer  Tub or Shower Type: Tub/Shower combination    EXAMINATION/PRESENTATION/DECISION MAKING:   Critical Behavior:  Neurologic State: Alert  Orientation Level: Disoriented to time, Oriented to person, Oriented to place, Oriented to situation  Cognition: Follows commands, Memory loss     Hearing: Auditory  Auditory Impairment: None  Skin:  intact  Edema: none noted   Range Of Motion:  AROM: Within functional limits                       Strength:    Strength: Generally decreased, functional                    Tone & Sensation:   Tone: Normal                              Coordination:  Coordination: Within functional limits  Vision:      Functional Mobility:  Bed Mobility:  Rolling: Independent  Supine to Sit: Independent     Scooting: Independent  Transfers:  Sit to Stand: Stand-by assistance  Stand to Sit: Stand-by assistance        Bed to Chair: Contact guard assistance              Balance:   Sitting: Intact  Standing: Impaired; Without support  Standing - Static: Good  Standing - Dynamic : Fair  Ambulation/Gait Training:  Distance (ft): 150 Feet (ft)  Assistive Device: Gait belt  Ambulation - Level of Assistance: Contact guard assistance        Gait Abnormalities: Shuffling gait        Base of Support: Widened     Speed/Deepika: Pace decreased (<100 feet/min); Shuffled  Step Length: Left shortened;Right shortened                  Functional Measure:  Barthel Index:    Bathin  Bladder: 5  Bowels: 10  Groomin  Dressin  Feeding: 10  Mobility: 10  Stairs: 0  Toilet Use: 5  Transfer (Bed to Chair and Back): 10  Total: 60/100       Percentage of impairment   0%   1-19%   20-39%   40-59%   60-79%   80-99%   100%   Barthel Score 0-100 100 99-80 79-60 59-40 20-39 1-19   0     The Barthel ADL Index: Guidelines  1. The index should be used as a record of what a patient does, not as a record of what a patient could do. 2. The main aim is to establish degree of independence from any help, physical or verbal, however minor and for whatever reason. 3. The need for supervision renders the patient not independent. 4. A patient's performance should be established using the best available evidence. Asking the patient, friends/relatives and nurses are the usual sources, but direct observation and common sense are also important. However direct testing is not needed. 5. Usually the patient's performance over the preceding 24-48 hours is important, but occasionally longer periods will be relevant. 6. Middle categories imply that the patient supplies over 50 per cent of the effort. 7. Use of aids to be independent is allowed. Nzaia Munoz., Barthel, D.W. (0002). Functional evaluation: the Barthel Index. [facility] (14)2. LINDA Cox, Lorenzo Ely., Henry Warner., [place], [address] [place] [address] (1999). Measuring the change indisability after inpatient rehabilitation; comparison of the responsiveness of the Barthel Index and Functional [place] Measure. Journal of Neurology, Neurosurgery, and Psychiatry, 66(4), 359-320. Chriss Walter, N.FELISA.CELINE, BRENNA Mckay, & Alyson Liz M.A. (2004.) Assessment of post-stroke quality of life in cost-effectiveness studies: The usefulness of the Barthel Index and the EuroQoL-5D.  Quality of Life Research, 15, 311-58           Physical Therapy Evaluation Charge Determination   History Examination Presentation Decision-Making   MEDIUM  Complexity : 1-2 comorbidities / personal factors will impact the outcome/ POC  MEDIUM Complexity : 3 Standardized tests and measures addressing body structure, function, activity limitation and / or participation in recreation  MEDIUM Complexity : Evolving with changing characteristics  MEDIUM Complexity : FOTO score of 26-74      Based on the above components, the patient evaluation is determined to be of the following complexity level: MEDIUM    Pain:  Pain Scale 1: Numeric (0 - 10)  Pain Intensity 1: 0              Activity Tolerance:   VSS on RA  Please refer to the flowsheet for vital signs taken during this treatment. After treatment:   ?         Patient left in no apparent distress sitting up in chair  ? Patient left in no apparent distress in bed  ? Call bell left within reach  ? Nursing notified  ? Caregiver present  ? Bed alarm activated    COMMUNICATION/EDUCATION:   The patients plan of care was discussed with: Occupational Therapist and Registered Nurse. ?         Fall prevention education was provided and the patient/caregiver indicated understanding. ? Patient/family have participated as able in goal setting and plan of care. ?         Patient/family agree to work toward stated goals and plan of care. ?         Patient understands intent and goals of therapy, but is neutral about his/her participation. ? Patient is unable to participate in goal setting and plan of care.     Thank you for this referral.  Uziel Soriano, PT, DPT   Time Calculation: 22 mins

## 2019-06-13 ENCOUNTER — APPOINTMENT (OUTPATIENT)
Dept: NUCLEAR MEDICINE | Age: 79
DRG: 637 | End: 2019-06-13
Attending: NURSE PRACTITIONER
Payer: MEDICARE

## 2019-06-13 ENCOUNTER — APPOINTMENT (OUTPATIENT)
Dept: NON INVASIVE DIAGNOSTICS | Age: 79
DRG: 637 | End: 2019-06-13
Attending: NURSE PRACTITIONER
Payer: MEDICARE

## 2019-06-13 LAB
ANION GAP SERPL CALC-SCNC: 6 MMOL/L (ref 5–15)
APTT PPP: 46.2 SEC (ref 22.1–32)
APTT PPP: 51.2 SEC (ref 22.1–32)
APTT PPP: 89.4 SEC (ref 22.1–32)
BUN SERPL-MCNC: 15 MG/DL (ref 6–20)
BUN/CREAT SERPL: 19 (ref 12–20)
CALCIUM SERPL-MCNC: 7.2 MG/DL (ref 8.5–10.1)
CHLORIDE SERPL-SCNC: 107 MMOL/L (ref 97–108)
CO2 SERPL-SCNC: 25 MMOL/L (ref 21–32)
CREAT SERPL-MCNC: 0.81 MG/DL (ref 0.55–1.02)
GLUCOSE BLD STRIP.AUTO-MCNC: 229 MG/DL (ref 65–100)
GLUCOSE BLD STRIP.AUTO-MCNC: 344 MG/DL (ref 65–100)
GLUCOSE BLD STRIP.AUTO-MCNC: 361 MG/DL (ref 65–100)
GLUCOSE BLD STRIP.AUTO-MCNC: 85 MG/DL (ref 65–100)
GLUCOSE BLD STRIP.AUTO-MCNC: 92 MG/DL (ref 65–100)
GLUCOSE SERPL-MCNC: 164 MG/DL (ref 65–100)
POTASSIUM SERPL-SCNC: 3.5 MMOL/L (ref 3.5–5.1)
SERVICE CMNT-IMP: ABNORMAL
SERVICE CMNT-IMP: NORMAL
SERVICE CMNT-IMP: NORMAL
SODIUM SERPL-SCNC: 138 MMOL/L (ref 136–145)
STRESS TARGET HR: 143 BPM
THERAPEUTIC RANGE,PTTT: ABNORMAL SECS (ref 58–77)

## 2019-06-13 PROCEDURE — 80048 BASIC METABOLIC PNL TOTAL CA: CPT

## 2019-06-13 PROCEDURE — 85730 THROMBOPLASTIN TIME PARTIAL: CPT

## 2019-06-13 PROCEDURE — 65660000000 HC RM CCU STEPDOWN

## 2019-06-13 PROCEDURE — 94760 N-INVAS EAR/PLS OXIMETRY 1: CPT

## 2019-06-13 PROCEDURE — 74011250637 HC RX REV CODE- 250/637: Performed by: NURSE PRACTITIONER

## 2019-06-13 PROCEDURE — 74011250636 HC RX REV CODE- 250/636: Performed by: HOSPITALIST

## 2019-06-13 PROCEDURE — 74011636637 HC RX REV CODE- 636/637: Performed by: INTERNAL MEDICINE

## 2019-06-13 PROCEDURE — 74011250636 HC RX REV CODE- 250/636: Performed by: INTERNAL MEDICINE

## 2019-06-13 PROCEDURE — 93017 CV STRESS TEST TRACING ONLY: CPT

## 2019-06-13 PROCEDURE — 74011250637 HC RX REV CODE- 250/637: Performed by: INTERNAL MEDICINE

## 2019-06-13 PROCEDURE — 82962 GLUCOSE BLOOD TEST: CPT

## 2019-06-13 PROCEDURE — 36415 COLL VENOUS BLD VENIPUNCTURE: CPT

## 2019-06-13 PROCEDURE — A9500 TC99M SESTAMIBI: HCPCS

## 2019-06-13 RX ORDER — FUROSEMIDE 10 MG/ML
20 INJECTION INTRAMUSCULAR; INTRAVENOUS ONCE
Status: COMPLETED | OUTPATIENT
Start: 2019-06-13 | End: 2019-06-13

## 2019-06-13 RX ORDER — POTASSIUM CHLORIDE 750 MG/1
20 TABLET, FILM COATED, EXTENDED RELEASE ORAL
Status: COMPLETED | OUTPATIENT
Start: 2019-06-13 | End: 2019-06-13

## 2019-06-13 RX ORDER — SODIUM CHLORIDE 0.9 % (FLUSH) 0.9 %
10 SYRINGE (ML) INJECTION AS NEEDED
Status: DISCONTINUED | OUTPATIENT
Start: 2019-06-13 | End: 2019-06-15 | Stop reason: HOSPADM

## 2019-06-13 RX ADMIN — CLOPIDOGREL BISULFATE 75 MG: 75 TABLET, FILM COATED ORAL at 10:13

## 2019-06-13 RX ADMIN — Medication 10 ML: at 05:53

## 2019-06-13 RX ADMIN — HEPARIN SODIUM AND DEXTROSE 12 UNITS/KG/HR: 10000; 5 INJECTION INTRAVENOUS at 17:33

## 2019-06-13 RX ADMIN — METOPROLOL TARTRATE 12.5 MG: 25 TABLET ORAL at 10:14

## 2019-06-13 RX ADMIN — BRIMONIDINE TARTRATE 1 DROP: 2 SOLUTION OPHTHALMIC at 10:14

## 2019-06-13 RX ADMIN — INSULIN GLARGINE 22 UNITS: 100 INJECTION, SOLUTION SUBCUTANEOUS at 14:32

## 2019-06-13 RX ADMIN — Medication 10 ML: at 21:13

## 2019-06-13 RX ADMIN — POTASSIUM CHLORIDE 20 MEQ: 750 TABLET, FILM COATED, EXTENDED RELEASE ORAL at 10:48

## 2019-06-13 RX ADMIN — FUROSEMIDE 20 MG: 10 INJECTION, SOLUTION INTRAMUSCULAR; INTRAVENOUS at 10:48

## 2019-06-13 RX ADMIN — HEPARIN SODIUM AND DEXTROSE 13 UNITS/KG/HR: 10000; 5 INJECTION INTRAVENOUS at 23:15

## 2019-06-13 RX ADMIN — Medication 10 ML: at 11:50

## 2019-06-13 RX ADMIN — ATORVASTATIN CALCIUM 20 MG: 20 TABLET, FILM COATED ORAL at 21:13

## 2019-06-13 RX ADMIN — REGADENOSON 0.4 MG: 0.08 INJECTION, SOLUTION INTRAVENOUS at 11:50

## 2019-06-13 RX ADMIN — INSULIN LISPRO 3 UNITS: 100 INJECTION, SOLUTION INTRAVENOUS; SUBCUTANEOUS at 17:32

## 2019-06-13 RX ADMIN — ASPIRIN 81 MG: 81 TABLET ORAL at 10:14

## 2019-06-13 RX ADMIN — Medication 10 ML: at 14:32

## 2019-06-13 RX ADMIN — BRIMONIDINE TARTRATE 1 DROP: 2 SOLUTION OPHTHALMIC at 21:13

## 2019-06-13 RX ADMIN — METOPROLOL TARTRATE 12.5 MG: 25 TABLET ORAL at 21:13

## 2019-06-13 RX ADMIN — HEPARIN SODIUM 2000 UNITS: 5000 INJECTION INTRAVENOUS; SUBCUTANEOUS at 01:38

## 2019-06-13 RX ADMIN — Medication 10 ML: at 14:33

## 2019-06-13 RX ADMIN — LEVOTHYROXINE SODIUM 150 MCG: 150 TABLET ORAL at 05:52

## 2019-06-13 NOTE — DIABETES MGMT
[facility]    [facility] Progress Note    Recommendations/ Comments: If appropriate, please consider decreasing her Lantus dose to 20 units with fasting BG in the low 80's. Suspect BG >300 mg/dl is related to 2 hour gap in Lantus dosing. Current hospital DM medication: Lantus 22 units, Lispro 3 units prandial ac tid. No correctional coverage. Chart reviewed on Deana Chase. Patient is a 69 y.o. female with known DM on Tresiba 20 units and Novolog 3 units ac tid. A1c:   Lab Results   Component Value Date/Time    Hemoglobin A1c 13.6 (H) 05/28/2019 05:25 PM    Hemoglobin A1c 12.2 (H) 02/12/2019 05:08 PM       Recent Glucose Results:   Lab Results   Component Value Date/Time     (H) 06/13/2019 12:10 AM     (H) 06/12/2019 06:07 PM    GLUCPOC 344 (H) 06/13/2019 03:24 PM    GLUCPOC 92 06/13/2019 11:02 AM    GLUCPOC 85 06/13/2019 07:21 AM        Lab Results   Component Value Date/Time    Creatinine 0.81 06/13/2019 12:10 AM     Estimated Creatinine Clearance: 59.2 mL/min (based on SCr of 0.81 mg/dL). Active Orders   Diet    DIET DIABETIC CONSISTENT CARB Regular        PO intake: No data found. Will continue to follow as needed.     Thank you  [address]      Time spent: 5 minutes

## 2019-06-13 NOTE — PROGRESS NOTES
Pharmacy - Heparin Monitoring  Indication: ACS     Goal aPTT: 58-77 seconds    Initial dosing Weight: 75.9 kg    Labs:  Recent Labs     06/13/19  0010 06/12/19  1807 06/12/19  1102  06/11/19  0229   APTT 46.2* 69.0* 47.8*   < >  --    HGB  --   --   --   --  11.0*   PLT  --   --   --   --  222    < > = values in this interval not displayed. Current rate:  13 unit/kg/hr     Impression/Plan:   89.4.  Hold for 1 hour Resume at 12 units/kg/hr              Alexandre Larose, [address]

## 2019-06-13 NOTE — PROGRESS NOTES
General Daily Progress Note    Admit Date: 6/10/2019    Subjective:     Patient has no complaint . Current Facility-Administered Medications   Medication Dose Route Frequency    furosemide (LASIX) injection 20 mg  20 mg IntraVENous ONCE    potassium chloride SR (KLOR-CON 10) tablet 20 mEq  20 mEq Oral NOW    insulin lispro (HUMALOG) injection 3 Units  3 Units SubCUTAneous TIDAC    alcohol 62% (NOZIN) nasal  1 Ampule  1 Ampule Topical Q12H    heparin 25,000 units in D5W 250 ml infusion  12-25 Units/kg/hr IntraVENous TITRATE    heparin (porcine) injection 2,000 Units  2,000 Units IntraVENous PRN    Or    heparin (porcine) injection 4,000 Units  4,000 Units IntraVENous PRN    levothyroxine (SYNTHROID) tablet 150 mcg  150 mcg Oral 6am    clopidogrel (PLAVIX) tablet 75 mg  75 mg Oral DAILY    glucose chewable tablet 16 g  4 Tab Oral PRN    glucagon (GLUCAGEN) injection 1 mg  1 mg IntraMUSCular PRN    insulin glargine (LANTUS) injection 22 Units  22 Units SubCUTAneous Q24H    aspirin delayed-release tablet 81 mg  81 mg Oral DAILY    metoprolol tartrate (LOPRESSOR) tablet 12.5 mg  12.5 mg Oral Q12H    atorvastatin (LIPITOR) tablet 20 mg  20 mg Oral QHS    glucose chewable tablet 16 g  4 Tab Oral PRN    glucagon (GLUCAGEN) injection 1 mg  1 mg IntraMUSCular PRN    dextrose 10% infusion 100 mL  100 mL IntraVENous PRN    sodium chloride (NS) flush 5-40 mL  5-40 mL IntraVENous Q8H    sodium chloride (NS) flush 5-40 mL  5-40 mL IntraVENous PRN    ondansetron (ZOFRAN) injection 4 mg  4 mg IntraVENous Q6H PRN    brimonidine (ALPHAGAN) 0.2 % ophthalmic solution 1 Drop  1 Drop Both Eyes BID        Review of Systems  A comprehensive review of systems was negative.     Objective:     Patient Vitals for the past 24 hrs:   BP Temp Pulse Resp SpO2   06/13/19 0720 140/64 98.1 °F (36.7 °C) 76 18 97 %   06/13/19 0344 133/72 97.8 °F (36.6 °C) 78 18 98 %   06/12/19 2353 139/60 98.2 °F (36.8 °C) 76 18 99 % 06/12/19 1943 130/70 98.1 °F (36.7 °C) 77 18 100 %   06/12/19 1651 140/75 98.4 °F (36.9 °C) 73 18 100 %   06/12/19 1126 130/70 98.3 °F (36.8 °C) 71 18 98 %     No intake/output data recorded. 06/11 1901 - 06/13 0700  In: 200 [P.O.:960; I.V.:700]  Out: 200 [Urine:200]    Physical Exam:   Visit Vitals  /64   Pulse 76   Temp 98.1 °F (36.7 °C)   Resp 18   Ht 5' 5\" (1.651 m)   Wt 167 lb (75.8 kg)   SpO2 97%   BMI 27.79 kg/m²     General appearance: alert, cooperative, no distress, appears stated age  Neck: supple, symmetrical, trachea midline, no adenopathy, thyroid: not enlarged, symmetric, no tenderness/mass/nodules, no carotid bruit and no JVD  Lungs: clear to auscultation bilaterally  Heart: regular rate and rhythm, S1, S2 normal, no murmur, click, rub or gallop  Abdomen: soft, non-tender.  Bowel sounds normal. No masses,  no organomegaly  Extremities: edema 1+        Data Review   Recent Results (from the past 24 hour(s))   METABOLIC PANEL, BASIC    Collection Time: 06/12/19 11:02 AM   Result Value Ref Range    Sodium 138 136 - 145 mmol/L    Potassium 3.6 3.5 - 5.1 mmol/L    Chloride 107 97 - 108 mmol/L    CO2 24 21 - 32 mmol/L    Anion gap 7 5 - 15 mmol/L    Glucose 215 (H) 65 - 100 mg/dL    BUN 17 6 - 20 MG/DL    Creatinine 0.86 0.55 - 1.02 MG/DL    BUN/Creatinine ratio 20 12 - 20      GFR est AA >60 >60 ml/min/1.73m2    GFR est non-AA >60 >60 ml/min/1.73m2    Calcium 7.2 (L) 8.5 - 10.1 MG/DL   PTT    Collection Time: 06/12/19 11:02 AM   Result Value Ref Range    aPTT 47.8 (H) 22.1 - 32.0 sec    aPTT, therapeutic range     58.0 - 77.0 SECS   GLUCOSE, POC    Collection Time: 06/12/19 11:31 AM   Result Value Ref Range    Glucose (POC) 212 (H) 65 - 100 mg/dL    Performed by Daniel Andrews (PCT)    GLUCOSE, POC    Collection Time: 06/12/19  4:26 PM   Result Value Ref Range    Glucose (POC) 184 (H) 65 - 100 mg/dL    Performed by Alexandro Sauer, BASIC    Collection Time: 06/12/19  6:07 PM   Result Value Ref Range    Sodium 136 136 - 145 mmol/L    Potassium 3.7 3.5 - 5.1 mmol/L    Chloride 107 97 - 108 mmol/L    CO2 21 21 - 32 mmol/L    Anion gap 8 5 - 15 mmol/L    Glucose 272 (H) 65 - 100 mg/dL    BUN 16 6 - 20 MG/DL    Creatinine 0.92 0.55 - 1.02 MG/DL    BUN/Creatinine ratio 17 12 - 20      GFR est AA >60 >60 ml/min/1.73m2    GFR est non-AA 59 (L) >60 ml/min/1.73m2    Calcium 7.6 (L) 8.5 - 10.1 MG/DL   PTT    Collection Time: 06/12/19  6:07 PM   Result Value Ref Range    aPTT 69.0 (H) 22.1 - 32.0 sec    aPTT, therapeutic range     58.0 - 77.0 SECS   GLUCOSE, POC    Collection Time: 06/12/19  9:16 PM   Result Value Ref Range    Glucose (POC) 130 (H) 65 - 100 mg/dL    Performed by Jesu Lyons ([facility])    METABOLIC PANEL, BASIC    Collection Time: 06/13/19 12:10 AM   Result Value Ref Range    Sodium 138 136 - 145 mmol/L    Potassium 3.5 3.5 - 5.1 mmol/L    Chloride 107 97 - 108 mmol/L    CO2 25 21 - 32 mmol/L    Anion gap 6 5 - 15 mmol/L    Glucose 164 (H) 65 - 100 mg/dL    BUN 15 6 - 20 MG/DL    Creatinine 0.81 0.55 - 1.02 MG/DL    BUN/Creatinine ratio 19 12 - 20      GFR est AA >60 >60 ml/min/1.73m2    GFR est non-AA >60 >60 ml/min/1.73m2    Calcium 7.2 (L) 8.5 - 10.1 MG/DL   PTT    Collection Time: 06/13/19 12:10 AM   Result Value Ref Range    aPTT 46.2 (H) 22.1 - 32.0 sec    aPTT, therapeutic range     58.0 - 77.0 SECS   GLUCOSE, POC    Collection Time: 06/13/19  7:21 AM   Result Value Ref Range    Glucose (POC) 85 65 - 100 mg/dL    Performed by Reji Blood (PCT)    NUCLEAR CARDIAC STRESS TEST    Collection Time: 06/13/19  8:42 AM   Result Value Ref Range    Target  bpm           Assessment:     Principal Problem:    DKA (diabetic ketoacidoses) ([address] (6/2/2017)    Active Problems:    Vascular dementia without behavioral disturbance (10/20/2018)      Elevated troponin (6/11/2019)      Hypertension (2/6/2015)      Lactic acidosis (6/10/2019)      SIRS (systemic inflammatory response syndrome) ([facility] (6/10/2019)      ONEYDA (acute kidney injury) ([facility] (6/10/2019)      Acute encephalopathy (6/10/2019)        Plan:     1. Cardiac work-up in progress. 2.  Diabetic control reasonable. CGM to began once approved. 3.  Will mobilize.

## 2019-06-13 NOTE — PROGRESS NOTES
Bedside shift change report given to Malu (oncoming nurse) by Vianey Bailey (offgoing nurse). Report included the following information SBAR, Kardex, Intake/Output, MAR and Recent Results.

## 2019-06-13 NOTE — PROGRESS NOTES
NUC MED: Lexiscan Cardiac in progress. Pt for Stress test @ 11:30am. Stress lab will send for pt. Please keep NPO.

## 2019-06-13 NOTE — PROGRESS NOTES
NUC MED: Lexiscan Cardiac in progress. Pt will come for NM images @ 1400. Pt may eat per Omer Mclaughlin.

## 2019-06-13 NOTE — PROGRESS NOTES
Chart reviewed. Therapy recommends [facility] for nursing, PT, and OT. Pt has been open to [facility] in 2018. Per chart, a son lives with the pt that has special needs. Pt states she drives//forgets to take her meds appropriately. Pt is a full code, [employer], and has a son Kallie Lopez at [phone]. Will send to [facility] and follow.

## 2019-06-13 NOTE — PROGRESS NOTES
[address] [place] [address]  [phone]      Cardiology Progress Note      6/13/2019 8:18 AM    Admit Date: 6/10/2019    Admit Diagnosis:   DKA (diabetic ketoacidoses) ([facility] [E13.10]  Acute encephalopathy [G93.40]  [facility]systemic inflammatory response syndrome) [facility] [R65.10]  ONEYDA (acute kidney injury) ([facility] [N17.9]  Lactic acidosis [E87.2]    Subjective:     Junious Buzz has no complaints. Alert, oriented, but remains confused. The patient admits to missing some meds because she forgets. But states compliance. Talked to Dr. Isatu Turner this AM reference his opinion to pursue cardiac evaluation - he feels she has good family support and will be compliant with meds.        Visit Vitals  /75   Pulse 69   Temp 97.7 °F (36.5 °C)   Resp 18   Ht 5' 5\" (1.651 m)   Wt 75.8 kg (167 lb)   SpO2 98%   BMI 27.79 kg/m²       Current Facility-Administered Medications   Medication Dose Route Frequency    sodium chloride (NS) flush 10 mL  10 mL IntraVENous PRN    insulin lispro (HUMALOG) injection 3 Units  3 Units SubCUTAneous TIDAC    alcohol 62% (NOZIN) nasal  1 Ampule  1 Ampule Topical Q12H    heparin 25,000 units in D5W 250 ml infusion  12-25 Units/kg/hr IntraVENous TITRATE    heparin (porcine) injection 2,000 Units  2,000 Units IntraVENous PRN    Or    heparin (porcine) injection 4,000 Units  4,000 Units IntraVENous PRN    levothyroxine (SYNTHROID) tablet 150 mcg  150 mcg Oral 6am    clopidogrel (PLAVIX) tablet 75 mg  75 mg Oral DAILY    glucose chewable tablet 16 g  4 Tab Oral PRN    glucagon (GLUCAGEN) injection 1 mg  1 mg IntraMUSCular PRN    insulin glargine (LANTUS) injection 22 Units  22 Units SubCUTAneous Q24H    aspirin delayed-release tablet 81 mg  81 mg Oral DAILY    metoprolol tartrate (LOPRESSOR) tablet 12.5 mg  12.5 mg Oral Q12H    atorvastatin (LIPITOR) tablet 20 mg  20 mg Oral QHS    glucose chewable tablet 16 g  4 Tab Oral PRN    glucagon (GLUCAGEN) injection 1 mg  1 mg IntraMUSCular PRN    dextrose 10% infusion 100 mL  100 mL IntraVENous PRN    sodium chloride (NS) flush 5-40 mL  5-40 mL IntraVENous Q8H    sodium chloride (NS) flush 5-40 mL  5-40 mL IntraVENous PRN    ondansetron (ZOFRAN) injection 4 mg  4 mg IntraVENous Q6H PRN    brimonidine (ALPHAGAN) 0.2 % ophthalmic solution 1 Drop  1 Drop Both Eyes BID       Objective:      Physical Exam:  General Appearance:  eldelry AAF in no acute distress  Chest:   Clear  Cardiovascular:  Regular rate and rhythm, no murmur. Abdomen:   Soft, non-tender, bowel sounds are active. Extremities: no peripheral edema  Skin:  Warm and dry. Data Review:   Recent Labs     06/11/19 0229   WBC 11.3*   HGB 11.0*   HCT 31.0*        Recent Labs     06/13/19  0010 06/12/19  1807 06/12/19  1102  06/11/19  0805 06/11/19  0229 06/10/19  2215  06/10/19  1352    136 138   < > 142 144 141   < > 136   K 3.5 3.7 3.6   < > 4.3 3.2* 2.9*   < > 4.4    107 107   < > 112* 110* 107   < > 101   CO2 25 21 24   < > 25 26 19*   < > <5*   * 272* 215*   < > 104* 103* 285*   < > 766*   BUN 15 16 17   < > 27* 27* 29*   < > 36*   CREA 0.81 0.92 0.86   < > 1.10* 1.20* 1.37*   < > 1.83*   CA 7.2* 7.6* 7.2*   < > 7.4* 7.8* 7.9*   < > 7.9*   MG  --   --   --   --  1.7 1.8 2.0  --  2.5*   PHOS  --   --   --   --  2.4* 0.8*  --   --  8.5*    < > = values in this interval not displayed. Recent Labs     06/11/19  0805 06/11/19  0229 06/10/19  2215 06/10/19  1804   TROIQ 3.56* 3.25* 1.86* 0.22*   CPK  --   --   --  139   CKMB  --   --   --  9.4*         Intake/Output Summary (Last 24 hours) at 6/13/2019 1331  Last data filed at 6/12/2019 2353  Gross per 24 hour   Intake 480 ml   Output 200 ml   Net 280 ml        Telemetry: SR    Echo:  · Left Ventricle: Mild concentric hypertrophy. Estimated left ventricular ejection fraction is 56 - 60%. · Pericardium: Trivial pericardial effusion.   · Aortic Valve: Mild aortic valve stenosis is present. · Mitral Valve: Trace mitral valve regurgitation. · Tricuspid Valve: Mild to moderate tricuspid valve regurgitation is present. Assessment:     Principal Problem:    DKA (diabetic ketoacidoses) ([facility] (6/2/2017)    Active Problems:    Vascular dementia without behavioral disturbance (10/20/2018)      Elevated troponin (6/11/2019)      Hypertension (2/6/2015)      Lactic acidosis (6/10/2019)      SIRS (systemic inflammatory response syndrome) ([facility] (6/10/2019)      ONEYDA (acute kidney [facility]) ([facility] (6/10/2019)      Acute encephalopathy (6/10/2019)        Plan:     Elevated troponin/NSTEMI Type II:  No obvious s/s anginal equivalent, but with the significant rise of troponin will proceed with a nuclear lexiscan stress study tomorrow. Dr. Edmond Gutierrez discussed with family - nuclear stress test today. If positive will have further discussion with the family. Continue ASA 81mg daily, metoprolol 12.5mg BID, and lipitor. Patient is on Heparin IV drip. Echo EF 55%,, mild valvular disease      DKA:  Per hospitalist, resolving     ONEYDA:  Improving with hydration       Thomas Morrow [facility]  Cardiology    Patient seen and examined by me with nurse practitioner Thomas Morrow I personally performed all components of the history, physical, and medical decision making and agree with the assessment and plan with minor modifications as noted. Stress test negative for ischemia- continue with medical management.

## 2019-06-14 ENCOUNTER — HOME HEALTH ADMISSION (OUTPATIENT)
Dept: HOME HEALTH SERVICES | Facility: HOME HEALTH | Age: 79
End: 2019-06-14
Payer: MEDICARE

## 2019-06-14 LAB
ANION GAP SERPL CALC-SCNC: 7 MMOL/L (ref 5–15)
APTT PPP: 71.6 SEC (ref 22.1–32)
BUN SERPL-MCNC: 10 MG/DL (ref 6–20)
BUN/CREAT SERPL: 20 (ref 12–20)
CALCIUM SERPL-MCNC: 7.8 MG/DL (ref 8.5–10.1)
CHLORIDE SERPL-SCNC: 108 MMOL/L (ref 97–108)
CO2 SERPL-SCNC: 26 MMOL/L (ref 21–32)
CREAT SERPL-MCNC: 0.51 MG/DL (ref 0.55–1.02)
GLUCOSE BLD STRIP.AUTO-MCNC: 107 MG/DL (ref 65–100)
GLUCOSE BLD STRIP.AUTO-MCNC: 184 MG/DL (ref 65–100)
GLUCOSE BLD STRIP.AUTO-MCNC: 265 MG/DL (ref 65–100)
GLUCOSE BLD STRIP.AUTO-MCNC: 49 MG/DL (ref 65–100)
GLUCOSE BLD STRIP.AUTO-MCNC: 67 MG/DL (ref 65–100)
GLUCOSE BLD STRIP.AUTO-MCNC: 86 MG/DL (ref 65–100)
GLUCOSE SERPL-MCNC: 47 MG/DL (ref 65–100)
POTASSIUM SERPL-SCNC: 3.3 MMOL/L (ref 3.5–5.1)
SERVICE CMNT-IMP: ABNORMAL
SERVICE CMNT-IMP: NORMAL
SERVICE CMNT-IMP: NORMAL
SODIUM SERPL-SCNC: 141 MMOL/L (ref 136–145)
THERAPEUTIC RANGE,PTTT: ABNORMAL SECS (ref 58–77)

## 2019-06-14 PROCEDURE — 74011636637 HC RX REV CODE- 636/637: Performed by: INTERNAL MEDICINE

## 2019-06-14 PROCEDURE — 65660000000 HC RM CCU STEPDOWN

## 2019-06-14 PROCEDURE — 97535 SELF CARE MNGMENT TRAINING: CPT

## 2019-06-14 PROCEDURE — 74011250637 HC RX REV CODE- 250/637: Performed by: INTERNAL MEDICINE

## 2019-06-14 PROCEDURE — 85730 THROMBOPLASTIN TIME PARTIAL: CPT

## 2019-06-14 PROCEDURE — 74011000258 HC RX REV CODE- 258: Performed by: EMERGENCY MEDICINE

## 2019-06-14 PROCEDURE — 97530 THERAPEUTIC ACTIVITIES: CPT | Performed by: PHYSICAL THERAPIST

## 2019-06-14 PROCEDURE — 82962 GLUCOSE BLOOD TEST: CPT

## 2019-06-14 PROCEDURE — 80048 BASIC METABOLIC PNL TOTAL CA: CPT

## 2019-06-14 PROCEDURE — 36415 COLL VENOUS BLD VENIPUNCTURE: CPT

## 2019-06-14 PROCEDURE — 74011250637 HC RX REV CODE- 250/637: Performed by: NURSE PRACTITIONER

## 2019-06-14 PROCEDURE — 97116 GAIT TRAINING THERAPY: CPT | Performed by: PHYSICAL THERAPIST

## 2019-06-14 RX ORDER — INSULIN GLARGINE 100 [IU]/ML
16 INJECTION, SOLUTION SUBCUTANEOUS EVERY 24 HOURS
Status: DISCONTINUED | OUTPATIENT
Start: 2019-06-14 | End: 2019-06-15 | Stop reason: HOSPADM

## 2019-06-14 RX ORDER — ENOXAPARIN SODIUM 100 MG/ML
40 INJECTION SUBCUTANEOUS EVERY 24 HOURS
Status: DISCONTINUED | OUTPATIENT
Start: 2019-06-15 | End: 2019-06-15 | Stop reason: HOSPADM

## 2019-06-14 RX ADMIN — INSULIN GLARGINE 16 UNITS: 100 INJECTION, SOLUTION SUBCUTANEOUS at 16:06

## 2019-06-14 RX ADMIN — INSULIN LISPRO 3 UNITS: 100 INJECTION, SOLUTION INTRAVENOUS; SUBCUTANEOUS at 12:45

## 2019-06-14 RX ADMIN — ASPIRIN 81 MG: 81 TABLET ORAL at 08:58

## 2019-06-14 RX ADMIN — BRIMONIDINE TARTRATE 1 DROP: 2 SOLUTION OPHTHALMIC at 20:45

## 2019-06-14 RX ADMIN — INSULIN LISPRO 3 UNITS: 100 INJECTION, SOLUTION INTRAVENOUS; SUBCUTANEOUS at 08:58

## 2019-06-14 RX ADMIN — LEVOTHYROXINE SODIUM 150 MCG: 150 TABLET ORAL at 05:21

## 2019-06-14 RX ADMIN — Medication 10 ML: at 21:14

## 2019-06-14 RX ADMIN — Medication 10 ML: at 05:22

## 2019-06-14 RX ADMIN — METOPROLOL TARTRATE 12.5 MG: 25 TABLET ORAL at 20:44

## 2019-06-14 RX ADMIN — Medication 10 ML: at 16:07

## 2019-06-14 RX ADMIN — CLOPIDOGREL BISULFATE 75 MG: 75 TABLET, FILM COATED ORAL at 08:58

## 2019-06-14 RX ADMIN — BRIMONIDINE TARTRATE 1 DROP: 2 SOLUTION OPHTHALMIC at 09:00

## 2019-06-14 RX ADMIN — METOPROLOL TARTRATE 12.5 MG: 25 TABLET ORAL at 08:58

## 2019-06-14 RX ADMIN — DEXTROSE MONOHYDRATE 100 ML: 10 INJECTION, SOLUTION INTRAVENOUS at 06:30

## 2019-06-14 RX ADMIN — ATORVASTATIN CALCIUM 20 MG: 20 TABLET, FILM COATED ORAL at 21:14

## 2019-06-14 RX ADMIN — INSULIN LISPRO 3 UNITS: 100 INJECTION, SOLUTION INTRAVENOUS; SUBCUTANEOUS at 16:06

## 2019-06-14 NOTE — PROGRESS NOTES
Nutrition Assessment:    INTERVENTIONS/RECOMMENDATIONS:   · Continue current diet  · Initial/Brief Nutrition Education: Purpose of nutrition education: heart healthy diet    ASSESSMENT:   Chart reviewed. Pt reports good appetite and consuming >75% of meals. DTC following for BG management. Pt reports she has had consistent carb diet education in the past but we briefly discussed the topic. She requested information on heart healthy diet. She was provided with verbal education and printed material on the subject. Diet Order: Consistent carb  % Eaten:  No data found. Pertinent Medications: [x]Reviewed: lantus, humalog,   Pertinent Labs: [x]Reviewed: B-361  Food Allergies: [x]NKFA  []Other   Last BM:  6/10  Edema:  n/a    []RUE   []LUE   []RLE   []LLE      Pressure Ulcer:  n/a    [] Stage I   [] Stage II   [] Stage III   [] Stage IV      Anthropometrics: Height: 5' 5\" (165.1 cm) Weight: 75.8 kg (167 lb)    IBW (%IBW):   ( ) UBW (%UBW):   (  %)    BMI: Body mass index is 27.79 kg/m². This BMI is indicative of:  []Underweight   []Normal   [x]Overweight   [] Obesity   [] Extreme Obesity (BMI>40)  Last Weight Metrics:  Weight Loss Metrics 2019 2019 2019 2019 2019 2018 11/15/2018   Today's Wt 167 lb 160 lb 1.6 oz 164 lb 4.8 oz 162 lb 12.8 oz 167 lb 6.4 oz 168 lb 14.4 oz 172 lb 14.4 oz   BMI 27.79 kg/m2 28.36 kg/m2 29.1 kg/m2 28.84 kg/m2 29.65 kg/m2 29.92 kg/m2 30.63 kg/m2       Estimated Nutrition Needs (Based on): 2451 Kcals/day(MSJ 1243 x 1.3) , 76 g(1gPro/kg) Protein  Carbohydrate: At Least 130 g/day  Fluids: 1616 mL/day or per primary team    NUTRITION DIAGNOSES:   Problem:  Altered nutrition-related lab values      Etiology: related to DKA      Signs/Symptoms: as evidenced by BG in 200's, need for insulin drip.      Previous Nutrition Dx:  [x] Resolved  [] Unresolved           [] Progressing    NUTRITION INTERVENTIONS:  Meals/Snacks: General/healthful diet         Initial/Brief Nutrition Education: Purpose of nutrition education        GOAL:   Pt will consume >50% of meals with BG <200mg/dL in 3-5 days. NUTRITION MONITORING AND EVALUATION      Food/Nutrient Intake Outcomes:  Total energy intake  Physical Signs/Symptoms Outcomes: Electrolyte and renal profile, GI profile, Weight/weight change, Glucose profile, GI    Previous Goal Met:   [x] Met              [] Progressing Towards Goal              [] Not Progressing Towards Goal   Previous Recommendations:   [x] Implemented          [] Not Implemented          [] Not Applicable    LEARNING NEEDS (Diet, Food/Nutrient-Drug Interaction):    [] None Identified   [x] Identified and Education Provided/Documented   [] Identified and Pt declined/was not appropriate     Cultural, Jehovah's witness, OR Ethnic Dietary Needs:    [x] None Identified   [] Identified and Addressed     [x] Interdisciplinary Care Plan Reviewed/Documented    [x] Discharge Planning: Consistent carb, heart healthy diet    [] Participated in Interdisciplinary Rounds    NUTRITION RISK:    [] High              [] Moderate           [x]  Low  []  Minimal/Uncompromised      Eliud Rachel RDN  Pager [phone]  Weekend Pager [phone]

## 2019-06-14 NOTE — PROGRESS NOTES
Bedside and Verbal shift change report given to [facility] (oncoming nurse) by Antonia Acosta RN (offgoing nurse). Report included the following information SBAR, Kardex, Intake/Output, MAR and Recent Results.

## 2019-06-14 NOTE — PROGRESS NOTES
[id number]  END OF SHIFT SUMMARY    PT A&OX3. NO S/S OF DISTRESS NOTED. VSS. PT OOB IN CHAIR WITH STANDY BY ASS. NO C/O PAIN THROUGHOUT SHIFT. HEP DRIP DECREASED BY 3UNITS TO 12 UNITS/KG/HR, RECHECK PTT AT 2000. TERESSA-SCAN RESULTS WNLS. PT  ATE 25% OF LUNCH, BS SPIKED TO >300 AFTER GIVING 1/2 CUP OF OJ FOR BS 96 WITH 22UNIT LONG ACTING INSULIN. COVERED WITH SHORT ACTING AND NIGHT SHIFT MADE AWARE. WILL MONITOR BS TONIGHT.

## 2019-06-14 NOTE — PROGRESS NOTES
Maira Chopra, [address] [place], [place] [phone]      Cardiology Progress Note      6/14/2019 8:18 AM    Admit Date: 6/10/2019    Admit Diagnosis:   DKA (diabetic ketoacidoses) ([facility] [E13.10]  Acute encephalopathy [G93.40]  SIRS (systemic inflammatory response syndrome) ([facility] [R65.10]  ONEYDA (acute kidney injury) ([facility] [N17.9]  Lactic acidosis [E87.2]    Subjective:     Janay Zapien has no complaints. Alert, oriented, but remains pleasantly confused. Negative nuclear stress test, no ischemia, no further cardiology evaluation indicated.      Visit Vitals  /66   Pulse 72   Temp 97.7 °F (36.5 °C)   Resp 18   Ht 5' 5\" (1.651 m)   Wt 75.8 kg (167 lb)   SpO2 99%   BMI 27.79 kg/m²       Current Facility-Administered Medications   Medication Dose Route Frequency    sodium chloride (NS) flush 10 mL  10 mL IntraVENous PRN    insulin lispro (HUMALOG) injection 3 Units  3 Units SubCUTAneous TIDAC    alcohol 62% (NOZIN) nasal  1 Ampule  1 Ampule Topical Q12H    heparin 25,000 units in D5W 250 ml infusion  12-25 Units/kg/hr IntraVENous TITRATE    heparin (porcine) injection 2,000 Units  2,000 Units IntraVENous PRN    Or    heparin (porcine) injection 4,000 Units  4,000 Units IntraVENous PRN    levothyroxine (SYNTHROID) tablet 150 mcg  150 mcg Oral 6am    clopidogrel (PLAVIX) tablet 75 mg  75 mg Oral DAILY    glucose chewable tablet 16 g  4 Tab Oral PRN    glucagon (GLUCAGEN) injection 1 mg  1 mg IntraMUSCular PRN    insulin glargine (LANTUS) injection 22 Units  22 Units SubCUTAneous Q24H    aspirin delayed-release tablet 81 mg  81 mg Oral DAILY    metoprolol tartrate (LOPRESSOR) tablet 12.5 mg  12.5 mg Oral Q12H    atorvastatin (LIPITOR) tablet 20 mg  20 mg Oral QHS    glucose chewable tablet 16 g  4 Tab Oral PRN    glucagon (GLUCAGEN) injection 1 mg  1 mg IntraMUSCular PRN    dextrose 10% infusion 100 mL  100 mL IntraVENous PRN    sodium chloride (NS) flush 5-40 mL  5-40 mL IntraVENous Q8H    sodium chloride (NS) flush 5-40 mL  5-40 mL IntraVENous PRN    ondansetron (ZOFRAN) injection 4 mg  4 mg IntraVENous Q6H PRN    brimonidine (ALPHAGAN) 0.2 % ophthalmic solution 1 Drop  1 Drop Both Eyes BID       Objective:      Physical Exam:  General Appearance:  timmy AAF in no acute distress  Chest:   Clear  Cardiovascular:  Regular rate and rhythm, no murmur. Abdomen:   Soft, non-tender, bowel sounds are active. Extremities: no peripheral edema  Skin:  Warm and dry. Data Review:   No results for input(s): WBC, HGB, HCT, PLT, HGBEXT, HCTEXT, PLTEXT, HGBEXT, HCTEXT, PLTEXT in the last 72 hours. Recent Labs     06/14/19  0521 06/13/19  0010 06/12/19  1807  06/11/19  0805    138 136   < > 142   K 3.3* 3.5 3.7   < > 4.3    107 107   < > 112*   CO2 26 25 21   < > 25   GLU 47* 164* 272*   < > 104*   BUN 10 15 16   < > 27*   CREA 0.51* 0.81 0.92   < > 1.10*   CA 7.8* 7.2* 7.6*   < > 7.4*   MG  --   --   --   --  1.7   PHOS  --   --   --   --  2.4*    < > = values in this interval not displayed. Recent Labs     06/11/19  0805   TROIQ 3.56*         Intake/Output Summary (Last 24 hours) at 6/14/2019 0726  Last data filed at 6/13/2019 1845  Gross per 24 hour   Intake 122 ml   Output    Net 122 ml        Telemetry: SR    Echo:  · Left Ventricle: Mild concentric hypertrophy. Estimated left ventricular ejection fraction is 56 - 60%. · Pericardium: Trivial pericardial effusion. · Aortic Valve: Mild aortic valve stenosis is present. · Mitral Valve: Trace mitral valve regurgitation. · Tricuspid Valve: Mild to moderate tricuspid valve regurgitation is present.          Assessment:     Principal Problem:    DKA (diabetic ketoacidoses) ([address] (6/2/2017)    Active Problems:    Vascular dementia without behavioral disturbance (10/20/2018)      Elevated troponin (6/11/2019)      Hypertension (2/6/2015)      Lactic acidosis (6/10/2019)      SIRS (systemic inflammatory response syndrome) ([facility] (6/10/2019)      ONEYDA (acute kidney injury) ([facility] (6/10/2019)      Acute encephalopathy (6/10/2019)        Plan:     Elevated troponin/NSTEMI Type II:  No obvious s/s anginal equivalent, negative nuclear stress study. Continue ASA 81mg daily, metoprolol 12.5mg BID, and lipitor. Heparin drip d/c'd, started on Lovenox for DVT therapy   Echo EF 55%,, mild valvular disease      DKA:  Per hospitalist, resolving     ONEYDA:  Improving with hydration       Sunil Drake [facility]  Cardiology    Cardiology signing off. F/u with Dr. Mike Fraire in 2-3 weeks. Patient seen and examined by me with nurse practitioner Sunil Drake. I personally performed all components of the history, physical, and medical decision making and agree with the assessment and plan with minor modifications as noted. Stress test negative for ischemia. Continue with aggressive medical management.

## 2019-06-14 NOTE — PROGRESS NOTES
Chart reviewed. Therapy recommends [address] for nursing, PT, and OT. Pt has been open to [facility] in 2018. Per chart, a son lives with the pt that has special needs. Pt states she drives//forgets to take her meds appropriately. Pt is a full code, [employer], and has a son Albert Varela at [phone]. Will send to [facility] and follow. Pt may be discharged over the weekend if stable. Order needed for [address] nursing, PT, and OT. [facility] is trying to confirm if they can follow. Orders have been placed and [facility] can follow. Pt in agreement.   States family will transport her home at 11a Saturday if all in agreemen

## 2019-06-14 NOTE — PROGRESS NOTES
Problem: Self Care Deficits Care Plan (Adult)  Goal: *Acute Goals and Plan of Care (Insert Text)  Description  Occupational Therapy Goals  Initiated 6/12/2019  1. Patient will perform grooming standing at the sink with modified independence within 7 day(s). 2.  Patient will perform bathing with set-up/supervision only within 7 day(s). 3.  Patient will perform lower body dressing with modified independence within 7 day(s). 4.  Patient will perform toilet transfers with modified independence within 7 day(s). 5.  Patient will perform all aspects of toileting with modified independence within 7 day(s). 6.  Patient will utilize energy conservation techniques during ADL with Min cues within 7 day(s). Outcome: Progressing Towards Goal   OCCUPATIONAL THERAPY TREATMENT  Patient: Joanna Solis (89 y.o. female)  Date: 6/14/2019  Diagnosis: DKA (diabetic ketoacidoses) ([facility] [E13.10]  Acute encephalopathy [G93.40]  SIRS (systemic inflammatory response syndrome) ([facility]) [R65.10]  ONEYDA (acute kidney injury) ([facility] [N17.9]  Lactic acidosis [E87.2] DKA (diabetic ketoacidoses) ([facility]       Precautions:    Chart, occupational therapy assessment, plan of care, and goals were reviewed. ASSESSMENT: Patient seen by Ot. Participated in energy conservation education and answered questions from patient. She seemed receptive to education and noted cargiac nutrition and diabetes management pamphlets in room. Patient without glasses so unable to read them at this time. Is initially CGA with movement but with prolonged sitting becomes stiff. Will be performing stair training with PT due to patient request.  Progression toward goals:  ?       Improving appropriately and progressing toward goals  ? Improving slowly and progressing toward goals  ? Not making progress toward goals and plan of care will be adjusted     PLAN:  Patient continues to benefit from skilled intervention to address the above impairments.   Continue treatment per established plan of care. Discharge Recommendations:  Home Health  Further Equipment Recommendations for Discharge:  none      SUBJECTIVE:   Patient stated ? I just found out I had a heart attack, but I had no chest pain. ?    OBJECTIVE DATA SUMMARY:   Cognitive/Behavioral Status:  Neurologic State: Alert                   Functional Mobility and Transfers for ADLs:  Bed Mobility:       Transfers:  Sit to Stand: Contact guard assistance          Balance:       ADL Intervention:  Educated patient on energy conservation and strategies to maximize her quality of life and decrease her stress/anxiety. 1. Deep breathing  2. Educated on pacing and making sure he/she takes short frequent breaks (e.g. In the shower wash the upper body, rest for 1 minute, then wash the lower body, etc)  3. Educated on using cooler water in the shower so as to not get fatigued from the heat  4. Educated on drying off by using a enrique cloth robe  5. Educated on re-arranging his/her routine to allow for rest breaks in the morning routine  6. Educated on using a mantra and medication to decrease feelings of anxiety, especially when short of breath  8. Educated on looking at the consequences of his/her actions before deciding he/she needs to take on a task (e.g not getting down on one's hands and knees to wash floors because it will take all of one's energy for the day and result in exhaustion). 9. Educated on fall alert necklaces/bracelets to increase safety  10. Educated on DME used to help conserve energy, such as a shower seat, a stool or chair in the kitchen, and pushing or pulling items instead of carrying them     Also educated on atypical presentaion women can have with hear attack as she noted she had no chest pain. Per cardiology has small NSTEMI Type Ii.     Pain:  Pain Scale 1: Numeric (0 - 10)  Pain Intensity 1: 0              Activity Tolerance:   good  Please refer to the flowsheet for vital signs taken during this treatment. After treatment:   ? Patient left in no apparent distress sitting up in chair  ? Patient left in no apparent distress in bed  ? Call bell left within reach  ? Nursing notified  ? Caregiver present  ?  Bed alarm activated    COMMUNICATION/COLLABORATION:   The patient?s plan of care was discussed with: Physical Therapist    Saturnino Elliott  Time Calculation: 17 mins

## 2019-06-14 NOTE — PROGRESS NOTES
General Daily Progress Note    Admit Date: 6/10/2019    Subjective:     Patient has no complaint . Current Facility-Administered Medications   Medication Dose Route Frequency    insulin glargine (LANTUS) injection 16 Units  16 Units SubCUTAneous Q24H    sodium chloride (NS) flush 10 mL  10 mL IntraVENous PRN    insulin lispro (HUMALOG) injection 3 Units  3 Units SubCUTAneous TIDAC    alcohol 62% (NOZIN) nasal  1 Ampule  1 Ampule Topical Q12H    heparin 25,000 units in D5W 250 ml infusion  12-25 Units/kg/hr IntraVENous TITRATE    heparin (porcine) injection 2,000 Units  2,000 Units IntraVENous PRN    Or    heparin (porcine) injection 4,000 Units  4,000 Units IntraVENous PRN    levothyroxine (SYNTHROID) tablet 150 mcg  150 mcg Oral 6am    clopidogrel (PLAVIX) tablet 75 mg  75 mg Oral DAILY    glucose chewable tablet 16 g  4 Tab Oral PRN    glucagon (GLUCAGEN) injection 1 mg  1 mg IntraMUSCular PRN    aspirin delayed-release tablet 81 mg  81 mg Oral DAILY    metoprolol tartrate (LOPRESSOR) tablet 12.5 mg  12.5 mg Oral Q12H    atorvastatin (LIPITOR) tablet 20 mg  20 mg Oral QHS    glucose chewable tablet 16 g  4 Tab Oral PRN    glucagon (GLUCAGEN) injection 1 mg  1 mg IntraMUSCular PRN    dextrose 10% infusion 100 mL  100 mL IntraVENous PRN    sodium chloride (NS) flush 5-40 mL  5-40 mL IntraVENous Q8H    sodium chloride (NS) flush 5-40 mL  5-40 mL IntraVENous PRN    ondansetron (ZOFRAN) injection 4 mg  4 mg IntraVENous Q6H PRN    brimonidine (ALPHAGAN) 0.2 % ophthalmic solution 1 Drop  1 Drop Both Eyes BID        Review of Systems  A comprehensive review of systems was negative.     Objective:     Patient Vitals for the past 24 hrs:   BP Temp Pulse Resp SpO2   06/14/19 0709 155/66 97.7 °F (36.5 °C) 72 18 99 %   06/14/19 0430 137/70 98.1 °F (36.7 °C) 67 18 98 %   06/13/19 2304 133/73 97.9 °F (36.6 °C) 74 17 100 %   06/13/19 1943 137/65 97.9 °F (36.6 °C) 75 18 100 %   06/13/19 1513 139/52 97.5 °F (36.4 °C) 68 18 99 %   06/13/19 1058 121/75 97.7 °F (36.5 °C) 69 18 98 %     No intake/output data recorded. 06/12 1901 - 06/14 0700  In: 9104 [P.O.:1200; I.V.:254]  Out: -     Physical Exam:   Visit Vitals  /66   Pulse 72   Temp 97.7 °F (36.5 °C)   Resp 18   Ht 5' 5\" (1.651 m)   Wt 167 lb (75.8 kg)   SpO2 99%   BMI 27.79 kg/m²     General appearance: alert, cooperative, no distress, appears stated age  Neck: supple, symmetrical, trachea midline, no adenopathy, thyroid: not enlarged, symmetric, no tenderness/mass/nodules, no carotid bruit and no JVD  Lungs: clear to auscultation bilaterally  Heart: regular rate and rhythm, S1, S2 normal, no murmur, click, rub or gallop  Abdomen: soft, non-tender.  Bowel sounds normal. No masses,  no organomegaly  Extremities: extremities normal, atraumatic, no cyanosis or edema        Data Review   Recent Results (from the past 24 hour(s))   PTT    Collection Time: 06/13/19 10:24 AM   Result Value Ref Range    aPTT 89.4 (HH) 22.1 - 32.0 sec    aPTT, therapeutic range     58.0 - 77.0 SECS   GLUCOSE, POC    Collection Time: 06/13/19 11:02 AM   Result Value Ref Range    Glucose (POC) 92 65 - 100 mg/dL    Performed by Reji Blood (PCT)    NUCLEAR CARDIAC STRESS TEST    Collection Time: 06/13/19  1:28 PM   Result Value Ref Range    Target  bpm   GLUCOSE, POC    Collection Time: 06/13/19  3:24 PM   Result Value Ref Range    Glucose (POC) 344 (H) 65 - 100 mg/dL    Performed by Reji Blood (PCT)    GLUCOSE, POC    Collection Time: 06/13/19  5:28 PM   Result Value Ref Range    Glucose (POC) 361 (H) 65 - 100 mg/dL    Performed by Reji Blood (PCT)    PTT    Collection Time: 06/13/19  8:32 PM   Result Value Ref Range    aPTT 51.2 (H) 22.1 - 32.0 sec    aPTT, therapeutic range     58.0 - 77.0 SECS   GLUCOSE, POC    Collection Time: 06/13/19  9:31 PM   Result Value Ref Range    Glucose (POC) 229 (H) 65 - 100 mg/dL    Performed by Jesu Lyons (BRIA)    PTT Collection Time: 06/14/19  5:21 AM   Result Value Ref Range    aPTT 71.6 (H) 22.1 - 32.0 sec    aPTT, therapeutic range     58.0 - 61.7 SECS   METABOLIC PANEL, BASIC    Collection Time: 06/14/19  5:21 AM   Result Value Ref Range    Sodium 141 136 - 145 mmol/L    Potassium 3.3 (L) 3.5 - 5.1 mmol/L    Chloride 108 97 - 108 mmol/L    CO2 26 21 - 32 mmol/L    Anion gap 7 5 - 15 mmol/L    Glucose 47 (LL) 65 - 100 mg/dL    BUN 10 6 - 20 MG/DL    Creatinine 0.51 (L) 0.55 - 1.02 MG/DL    BUN/Creatinine ratio 20 12 - 20      GFR est AA >60 >60 ml/min/1.73m2    GFR est non-AA >60 >60 ml/min/1.73m2    Calcium 7.8 (L) 8.5 - 10.1 MG/DL   GLUCOSE, POC    Collection Time: 06/14/19  6:26 AM   Result Value Ref Range    Glucose (POC) 49 (LL) 65 - 100 mg/dL    Performed by Arielle Nesbitt, POC    Collection Time: 06/14/19  6:42 AM   Result Value Ref Range    Glucose (POC) 67 65 - 100 mg/dL    Performed by Arielle Nesbitt, POC    Collection Time: 06/14/19  6:53 AM   Result Value Ref Range    Glucose (POC) 86 65 - 100 mg/dL    Performed by Cristo Yun            Assessment:     Principal Problem:    DKA (diabetic ketoacidoses) ([facility] (6/2/2017)    Active Problems:    Vascular dementia without behavioral disturbance (10/20/2018)      Elevated troponin (6/11/2019)      Hypertension (2/6/2015)      Lactic acidosis (6/10/2019)      SIRS (systemic inflammatory response syndrome) ([facility] (6/10/2019)      ONEYDA (acute kidney injury) ([facility] (6/10/2019)      Acute encephalopathy (6/10/2019)        Plan:     1. Blood sugars are a bit low therefore will reduce basal insulin. 2.  Cardiac work-up in progress to rule out ischemic coronary disease. 3.  Will mobilize.

## 2019-06-14 NOTE — PROGRESS NOTES
Problem: Mobility Impaired (Adult and Pediatric)  Goal: *Acute Goals and Plan of Care (Insert Text)  Description  Physical Therapy Goals  Initiated 6/12/2019  1. Patient will move from supine to sit and sit to supine , scoot up and down and roll side to side in bed with independence within 7 day(s). 2.  Patient will transfer from bed to chair and chair to bed with independence using the least restrictive device within 7 day(s). 3.  Patient will perform sit to stand with independence within 7 day(s). 4.  Patient will ambulate with independence for 300 feet with the least restrictive device within 7 day(s). 5.  Patient will ascend/descend 12 stairs with 1 handrail(s) with modified independence within 7 day(s). Outcome: Progressing Towards Goal   PHYSICAL THERAPY TREATMENT  Patient: Loly Prado (49 y.o. female)  Date: 6/14/2019  Diagnosis: DKA (diabetic ketoacidoses) ([facility] [E13.10]  Acute encephalopathy [G93.40]  SIRS (systemic inflammatory response syndrome) ([facility]) [R65.10]  ONEYDA (acute kidney injury) ([facility] [N17.9]  Lactic acidosis [E87.2] DKA (diabetic ketoacidoses) ([facility]       Precautions:  falls  Chart, physical therapy assessment, plan of care and goals were reviewed. ASSESSMENT: Patient demonstrating improvements in gait stability with distance walked. Was able to ambulate 200 feet without use of AD but patient initially demonstrating path deviations and mild balance checks but no overt LOB noted. Safe on stairs using one railing for support. Recommend use of RW to provide increased stability, but patient declining use of AD. Recommend HHPT following discharge. Progression toward goals:  ?    Improving appropriately and progressing toward goals  ? Improving slowly and progressing toward goals  ? Not making progress toward goals and plan of care will be adjusted     PLAN:  Patient continues to benefit from skilled intervention to address the above impairments.   Continue treatment per established plan of care. Discharge Recommendations:  Home Health  Further Equipment Recommendations for Discharge:  rolling walker but patient is not interested     SUBJECTIVE:   Patient stated ? Thank you so much for walking with me. I feel so much better. ?    OBJECTIVE DATA SUMMARY:   Critical Behavior:  Neurologic State: Alert  Orientation Level: Oriented X4  Cognition: Follows commands, Memory loss  Safety/Judgement: Awareness of environment, Decreased awareness of need for safety  Functional Mobility Training:  Bed Mobility:      Deferred; upon arrival patient sitting in chair              Transfers:  Sit to Stand: Stand-by assistance  Stand to Sit: Stand-by assistance                             Balance:  Sitting: Intact  Standing: Impaired  Standing - Static: Good; Unsupported  Standing - Dynamic : (fair initially and improved to good with distance walked)  Ambulation/Gait Training:  Distance (ft): 250 Feet (ft)  Assistive Device: Gait belt  Ambulation - Level of Assistance: Contact guard assistance        Gait Abnormalities: Decreased step clearance(mild path deviations initially which improved with distance )              Speed/Deepika: Pace decreased (<100 feet/min)  Step Length: Left shortened;Right shortened      Gait is mildly unsteady initially demonstrating mild path deviations and balance checks but no overt LOB noted; improved gait stability with distance walked       Stairs:  Number of Stairs Trained: 12  Stairs - Level of Assistance: Contact guard assistance   Rail Use: Right       Pain:  Pain Scale 1: Numeric (0 - 10)  Pain Intensity 1: 0        After treatment:   ?    Patient left in no apparent distress sitting up in chair  ? Patient left in no apparent distress in bed  ? Call bell left within reach  ? Nursing notified  ? Caregiver present  ?     Bed alarm activated    COMMUNICATION/COLLABORATION:   The patient?s plan of care was discussed with: Occupational Therapist and Registered Nurse    Kathleen Huff, PT   Time Calculation: 23 mins

## 2019-06-14 NOTE — PROGRESS NOTES
Bedside shift change report given to Chuck Almaguer (oncoming nurse) by Arina Bonner (offgoing nurse). Report included the following information SBAR, Kardex, Intake/Output, MAR and Recent Results.

## 2019-06-14 NOTE — PROGRESS NOTES
7465 - Received call from lab stating that pt's blood sugar was 45 in blood work.     [facility]    Patient with hypoglycemic episode(s) at 0626(time) on 6/14/19(date). BG value(s) pre-treatment 52    Was patient symptomatic?  [] yes, [] no  Patient was treated with the following rescue medications/treatments: [x] D50                [] Glucose tablets                [] Glucagon                [] 4oz juice                [x] 6oz reg soda                [] 8oz low fat milk  BG value post-treatment: 86  Once BG treated and value greater than 80mg/dl, pt was provided with the following:  [] snack  [] meal  Name of MD notified:Jason  The following orders were received:

## 2019-06-15 VITALS
WEIGHT: 167 LBS | SYSTOLIC BLOOD PRESSURE: 133 MMHG | RESPIRATION RATE: 16 BRPM | OXYGEN SATURATION: 96 % | HEART RATE: 61 BPM | BODY MASS INDEX: 27.82 KG/M2 | HEIGHT: 65 IN | DIASTOLIC BLOOD PRESSURE: 66 MMHG | TEMPERATURE: 98.1 F

## 2019-06-15 LAB
GLUCOSE BLD STRIP.AUTO-MCNC: 166 MG/DL (ref 65–100)
SERVICE CMNT-IMP: ABNORMAL

## 2019-06-15 PROCEDURE — 74011250637 HC RX REV CODE- 250/637: Performed by: NURSE PRACTITIONER

## 2019-06-15 PROCEDURE — 77030038269 HC DRN EXT URIN PURWCK BARD -A

## 2019-06-15 PROCEDURE — 74011636637 HC RX REV CODE- 636/637: Performed by: INTERNAL MEDICINE

## 2019-06-15 PROCEDURE — 82962 GLUCOSE BLOOD TEST: CPT

## 2019-06-15 PROCEDURE — 74011250636 HC RX REV CODE- 250/636: Performed by: NURSE PRACTITIONER

## 2019-06-15 PROCEDURE — 74011250637 HC RX REV CODE- 250/637: Performed by: INTERNAL MEDICINE

## 2019-06-15 RX ORDER — INSULIN ASPART 100 [IU]/ML
INJECTION, SOLUTION INTRAVENOUS; SUBCUTANEOUS
Qty: 1 ADJUSTABLE DOSE PRE-FILLED PEN SYRINGE | Refills: 11 | Status: SHIPPED | OUTPATIENT
Start: 2019-06-15 | End: 2019-06-15 | Stop reason: SDUPTHER

## 2019-06-15 RX ORDER — ASPIRIN 81 MG/1
81 TABLET ORAL DAILY
Qty: 30 TAB | Refills: 11 | Status: SHIPPED
Start: 2019-06-15 | End: 2019-11-24

## 2019-06-15 RX ORDER — INSULIN DEGLUDEC 100 U/ML
20 INJECTION, SOLUTION SUBCUTANEOUS DAILY
Qty: 1 ADJUSTABLE DOSE PRE-FILLED PEN SYRINGE | Refills: 11 | Status: ON HOLD | OUTPATIENT
Start: 2019-06-15 | End: 2019-11-24 | Stop reason: SDUPTHER

## 2019-06-15 RX ORDER — METOPROLOL SUCCINATE 25 MG/1
25 TABLET, EXTENDED RELEASE ORAL DAILY
Qty: 30 TAB | Refills: 11 | Status: SHIPPED | OUTPATIENT
Start: 2019-06-15 | End: 2020-02-27 | Stop reason: SDUPTHER

## 2019-06-15 RX ORDER — INSULIN ASPART 100 [IU]/ML
INJECTION, SOLUTION INTRAVENOUS; SUBCUTANEOUS
Qty: 1 ADJUSTABLE DOSE PRE-FILLED PEN SYRINGE | Refills: 11 | Status: SHIPPED | OUTPATIENT
Start: 2019-06-15 | End: 2020-06-25

## 2019-06-15 RX ADMIN — Medication 10 ML: at 06:14

## 2019-06-15 RX ADMIN — CLOPIDOGREL BISULFATE 75 MG: 75 TABLET, FILM COATED ORAL at 09:16

## 2019-06-15 RX ADMIN — METOPROLOL TARTRATE 12.5 MG: 25 TABLET ORAL at 09:16

## 2019-06-15 RX ADMIN — ASPIRIN 81 MG: 81 TABLET ORAL at 09:16

## 2019-06-15 RX ADMIN — ENOXAPARIN SODIUM 40 MG: 40 INJECTION SUBCUTANEOUS at 09:17

## 2019-06-15 RX ADMIN — BRIMONIDINE TARTRATE 1 DROP: 2 SOLUTION OPHTHALMIC at 09:18

## 2019-06-15 RX ADMIN — INSULIN LISPRO 3 UNITS: 100 INJECTION, SOLUTION INTRAVENOUS; SUBCUTANEOUS at 09:16

## 2019-06-15 RX ADMIN — LEVOTHYROXINE SODIUM 150 MCG: 150 TABLET ORAL at 06:14

## 2019-06-15 NOTE — PROGRESS NOTES
ALEKSANDAR: [facility] PT/OT/SN, Follow-up Care Appointments (PCP, Cardiology)    Pt to discharge home today by private vehicle with family. [facility] confirmed for [place] PT/OT, SN services. Cardiology f/u appointments scheduled. Pt/family to call and schedule PCP f/u appointment, as offices are closed at this time. All information entered into pt AVS.     Pt has no additional CM needs at this time. Care Management Interventions  PCP Verified by CM: Yes  Palliative Care Criteria Met (RRAT>21 & CHF Dx)?: No  Mode of Transport at Discharge:  Other (see comment)(by private vehicle with family)  Transition of Care Consult (CM Consult): [address]: Yes  MyChart Signup: No  Discharge Durable Medical Equipment: No  Physical Therapy Consult: Yes  Occupational Therapy Consult: Yes  Speech Therapy Consult: No  Current Support Network: Family Lives Nearby  Confirm Follow Up Transport: Family  Plan discussed with Pt/Family/Caregiver: Yes  Freedom of Choice Offered: Yes  Discharge Location  Discharge Placement: Home with home health([facility])    Sanna Melgoza MSW [employer]ise[employer] in Social Work, [address] [place] [address]  [phone]

## 2019-06-15 NOTE — PROGRESS NOTES
Bedside shift change report given to [facility] (oncoming nurse) by Patricio Kitchen (offgoing nurse). Report included the following information SBAR, Kardex, Procedure Summary, Intake/Output, MAR and Recent Results.

## 2019-06-15 NOTE — DISCHARGE INSTRUCTIONS
General Discharge Instructions    Patient ID:  Alex Samson  [id number]  69 y.o.  1940    Patient Instructions          The following personal items were collected during your admission and were returned to you. Take Home Medications             What to do at Home    Recommended diet: Diabetic Diet    Recommended activity: Activity as tolerated    Follow-up with Elvia Virk MD  in 3 days. Information obtained by :  I understand that if any problems occur once I am at home I am to contact my physician. I understand and acknowledge receipt of the instructions indicated above.                                                                                                                                            Physician's or R.N.'s Signature                                                                  Date/Time                                                                                                                                              Patient or Representative Signature                                                          Date/Time

## 2019-06-16 ENCOUNTER — HOME CARE VISIT (OUTPATIENT)
Dept: SCHEDULING | Facility: HOME HEALTH | Age: 79
End: 2019-06-16
Payer: MEDICARE

## 2019-06-16 PROCEDURE — 3331090002 HH PPS REVENUE DEBIT

## 2019-06-16 PROCEDURE — G0299 HHS/HOSPICE OF RN EA 15 MIN: HCPCS

## 2019-06-16 PROCEDURE — 400013 HH SOC

## 2019-06-16 PROCEDURE — 3331090001 HH PPS REVENUE CREDIT

## 2019-06-16 NOTE — DISCHARGE SUMMARY
[address] DISCHARGE SUMMARY Name:  Kelley Fermin 
MR#:  [id number] :  1940 ACCOUNT #:  [de-identified] ADMIT DATE:  06/10/2019 DISCHARGE DATE:  06/15/2019 HISTORY OF PRESENT ILLNESS:  The patient is a 62-year-old lady presented to the emergency room because of profound fatigue and lethargy. She was found to have diabetic ketoacidosis and was indeed admitted. Her initial CO2 on her electrolytes was less than 5. She has a history of type 2B diabetes and she probably had type 1. Her compliance has been extremely poor. Her last hemoglobin A1c was 13. Efforts to correct this had been feudal.  She is maintained on a basal insulin specifically [place] as well as prandial insulin. Any increase in the basal insulin leads to significant interventional hypoglycemia. Unfortunately, she does not check blood sugars on a consistent basis and does not eat in a timely fashion as well as consuming inadequate amount of calories per feeding. I have tried to get her on CGM, but this has not been successful, but I think this will indeed make a difference now. Past medical history, social history, review of systems, family history, and physical examination is as in admitting H and P. 
 
LABORATORY VALUES:  Abnormalities in the comprehensive profile on admission with blood sugar of 127, potassium 5.9, blood sugar 904. BUN and creatinine 34 and 1.76. Within 24 hours, her CO2 is 26, BUN and creatinine 27 and 1.20. Troponin levels progressively increased with initial level 1.86, repeat 3.25 and thereafter 3.56. She had no complaints of chest pain. Initial hemoglobin is 12.4, white count is 16.7; repeat 11.3 with a hemoglobin of 11.0 with following differential 74 segs, 10 lymphocytes, 10 monocytes, 1 eosinophils and 4 immature granulocytes. CT scan of the abdomen done in the emergency room for no reasons negative. Chest x-ray negative. Lexiscan negative. Echocardiogram showed ejection fraction of 56%-60%, left ventricular hypertrophy, mild aortic stenosis as well as mitral valve regurge. There is mild to moderate tricuspid regurge. HOSPITAL COURSE:  The patient was transferred to the intensive care unit where she was placed on an insulin drip and fluids. With this, her acidosis and hypoglycemia improved significantly. She was transitioned off to the basal and prandial insulin. Her mental status returned to baseline within 24 hours also. Because of the elevated troponin, she was seen by Cardiology. Dr. Delfin Barcenas. Although, she was asymptomatic, [place] was done was negative for ischemia. She was subsequently discharged home. From a diabetic prospective, however, the patient will have a continuous glucose monitor placed in the form of [address]. She does indeed qualify for this given the fact that she is technically a type 1/2B diabetic and she takes insulin four times a day and checks blood sugars four times a day. This should indeed make a difference and allow more aggressive intervention without fear of hypoglycemia. I also informed her that she needs to eat ***, which she is currently consuming for bedtime snack, protein needs to be added. FINAL DIAGNOSES: 
1. Diabetic ketoacidosis. 2.  Diabetes mellitus type 1. 
3.  Dehydration. 4.  Elevated troponin level with negative ischemic testing. 5.  Primary hypertension. 6.  Compensated hypothyroidism. 7.  Mild dementia. DISPOSITION: 
1. The patient will be discharged home ambulatory on an ADA diet. 2.  Discharge medications include the following:  Amlodipine 10 mg daily, Alphagan 0.15% one drop both eyes b.i.d., NovoLog insulin 4 units before meals, Tresiba 14 units q.a.m., Levoxyl 15 mg daily, losartan/HCTZ 100/25 mg one q.a.m., metoprolol succinate 25 mg daily, pravastatin 80 mg at bedtime. 3.  She remains a full code. 4.  She will return to the office in the next three to five days. 5.  A CGM will also be obtained. MD KENNETH [email or web address] 
D:  06/15/2019 17:18 
T:  06/15/2019 22:28 
JOB #:  [id number]

## 2019-06-16 NOTE — DISCHARGE SUMMARY
[address] SUMMARY    Name:  Felix Bang  MR#:  [id number]  :  1940  ACCOUNT #:  [de-identified]  ADMIT DATE:  06/10/2019  DISCHARGE DATE:  06/15/2019    AMENDED DOCUMENT - ADDENDUM ADDED; 2019      HISTORY OF PRESENT ILLNESS:  The patient is a 50-year-old lady presented to the emergency room because of profound fatigue and lethargy. She was found to have diabetic ketoacidosis and was indeed admitted. Her initial CO2 on her electrolytes was less than 5. She has a history of type 2B diabetes and she probably had type 1. Her compliance has been extremely poor. Her last hemoglobin A1c was 13. Efforts to correct this had been feudal.  She is maintained on a basal insulin specifically [place] as well as prandial insulin. Any increase in the basal insulin leads to significant interventional hypoglycemia. Unfortunately, she does not check blood sugars on a consistent basis and does not eat in a timely fashion as well as consuming inadequate amount of calories per feeding. I have tried to get her on CGM, but this has not been successful, but I think this will indeed make a difference now. Past medical history, social history, review of systems, family history, and physical examination is as in admitting H and P.    LABORATORY VALUES:  Abnormalities in the comprehensive profile on admission with blood sugar of 127, potassium 5.9, blood sugar 904. BUN and creatinine 34 and 1.76. Within 24 hours, her CO2 is 26, BUN and creatinine 27 and 1.20. Troponin levels progressively increased with initial level 1.86, repeat 3.25 and thereafter 3.56. She had no complaints of chest pain. Initial hemoglobin is 12.4, white count is 16.7; repeat 11.3 with a hemoglobin of 11.0 with following differential 74 segs, 10 lymphocytes, 10 monocytes, 1 eosinophils and 4 immature granulocytes. CT scan of the abdomen done in the emergency room for no reasons negative.   Chest x-ray negative. Lexiscan negative. Echocardiogram showed ejection fraction of 56%-60%, left ventricular hypertrophy, mild aortic stenosis as well as mitral valve regurge. There is mild to moderate tricuspid regurge. HOSPITAL COURSE:  The patient was transferred to the intensive care unit where she was placed on an insulin drip and fluids. With this, her acidosis and hypoglycemia improved significantly. She was transitioned off to the basal and prandial insulin. Her mental status returned to baseline within 24 hours also. Because of the elevated troponin, she was seen by Cardiology. Dr. Khushbu Mclain. Although, she was asymptomatic, Anthony Hasten was done was negative for ischemia. She was subsequently discharged home. From a diabetic prospective, however, the patient will have a continuous glucose monitor placed in the form of [address]. She does indeed qualify for this given the fact that she is technically a type 1/2B diabetic and she takes insulin four times a day and checks blood sugars four times a day. This should indeed make a difference and allow more aggressive intervention without fear of hypoglycemia. I also informed her that she needs to eat at the same time every day, which she is currently consuming for bedtime snack, protein needs to be added. FINAL DIAGNOSES:  1. Diabetic ketoacidosis. 2.  Diabetes mellitus type 1.  3.  Dehydration. 4.  Elevated troponin level with negative ischemic testing. 5.  Primary hypertension. 6.  Compensated hypothyroidism. 7.  Mild dementia. DISPOSITION:  1. The patient will be discharged home ambulatory on an ADA diet. 2.  Discharge medications include the following:  Amlodipine 10 mg daily, Alphagan 0.15% one drop both eyes b.i.d., NovoLog insulin 4 units before meals, Tresiba 14 units q.a.m., Levoxyl 15 mg daily, losartan/HCTZ 100/25 mg one q.a.m., metoprolol succinate 25 mg daily, pravastatin 80 mg at bedtime. 3.  She remains a full code.   4.  She will return to the office in the next three to five days. 5.  A CGM will also be obtained. ADDENDUM TO JOB #[id number]; 6/16/2019    I pressed upon the patient again as I always do, importance of eating at the same time everyday and consuming all of the food given to her each of the meals. This includes her breakfast, lunch, dinner and bedtime snack. As I commented earlier, she has to add protein to her bedtime snack.           MD KENNETH [email or web address]  D:  06/15/2019 17:18  T:  06/15/2019 22:28  JOB #:  [id number]

## 2019-06-17 ENCOUNTER — PATIENT OUTREACH (OUTPATIENT)
Dept: INTERNAL MEDICINE CLINIC | Age: 79
End: 2019-06-17

## 2019-06-17 VITALS
OXYGEN SATURATION: 98 % | HEART RATE: 68 BPM | SYSTOLIC BLOOD PRESSURE: 142 MMHG | DIASTOLIC BLOOD PRESSURE: 64 MMHG | TEMPERATURE: 97.9 F | RESPIRATION RATE: 20 BRPM

## 2019-06-17 PROCEDURE — 3331090001 HH PPS REVENUE CREDIT

## 2019-06-17 PROCEDURE — 3331090002 HH PPS REVENUE DEBIT

## 2019-06-18 ENCOUNTER — HOME CARE VISIT (OUTPATIENT)
Dept: SCHEDULING | Facility: HOME HEALTH | Age: 79
End: 2019-06-18
Payer: MEDICARE

## 2019-06-18 ENCOUNTER — OFFICE VISIT (OUTPATIENT)
Dept: INTERNAL MEDICINE CLINIC | Age: 79
End: 2019-06-18

## 2019-06-18 ENCOUNTER — PATIENT OUTREACH (OUTPATIENT)
Dept: INTERNAL MEDICINE CLINIC | Age: 79
End: 2019-06-18

## 2019-06-18 VITALS — HEIGHT: 65 IN | BODY MASS INDEX: 27.42 KG/M2 | WEIGHT: 164.6 LBS

## 2019-06-18 DIAGNOSIS — F01.50 VASCULAR DEMENTIA WITHOUT BEHAVIORAL DISTURBANCE (HCC): ICD-10-CM

## 2019-06-18 DIAGNOSIS — I10 ESSENTIAL HYPERTENSION: ICD-10-CM

## 2019-06-18 DIAGNOSIS — E10.65 HYPERGLYCEMIA DUE TO TYPE 1 DIABETES MELLITUS (HCC): Primary | ICD-10-CM

## 2019-06-18 DIAGNOSIS — R77.8 ELEVATED TROPONIN: ICD-10-CM

## 2019-06-18 DIAGNOSIS — E10.10 DIABETIC KETOACIDOSIS WITHOUT COMA ASSOCIATED WITH TYPE 1 DIABETES MELLITUS (HCC): ICD-10-CM

## 2019-06-18 DIAGNOSIS — E78.5 DYSLIPIDEMIA: ICD-10-CM

## 2019-06-18 PROBLEM — R65.10 SIRS (SYSTEMIC INFLAMMATORY RESPONSE SYNDROME) (HCC): Status: RESOLVED | Noted: 2019-06-10 | Resolved: 2019-06-18

## 2019-06-18 PROBLEM — E87.20 LACTIC ACIDOSIS: Status: RESOLVED | Noted: 2019-06-10 | Resolved: 2019-06-18

## 2019-06-18 PROCEDURE — 3331090001 HH PPS REVENUE CREDIT

## 2019-06-18 PROCEDURE — 3331090002 HH PPS REVENUE DEBIT

## 2019-06-18 RX ORDER — BLOOD-GLUCOSE METER
EACH MISCELLANEOUS
Qty: 1 EACH | Refills: 0 | Status: SHIPPED | OUTPATIENT
Start: 2019-06-18 | End: 2019-11-19 | Stop reason: CLARIF

## 2019-06-18 RX ORDER — CLOPIDOGREL BISULFATE 75 MG/1
TABLET ORAL
Refills: 11 | COMMUNITY
Start: 2019-04-10 | End: 2020-01-16

## 2019-06-18 RX ORDER — PEN NEEDLE, DIABETIC 31 GX5/16"
NEEDLE, DISPOSABLE MISCELLANEOUS
COMMUNITY
Start: 2019-06-07 | End: 2019-11-19

## 2019-06-18 RX ORDER — LANCETS
EACH MISCELLANEOUS
Qty: 100 EACH | Refills: 11 | Status: SHIPPED | OUTPATIENT
Start: 2019-06-18 | End: 2019-11-19 | Stop reason: CLARIF

## 2019-06-18 NOTE — PROGRESS NOTES
Chief Complaint   Patient presents with   [facility] Follow Up     1. Have you been to the ER, urgent care clinic since your last visit? Hospitalized since your last visit? Yes , hospitalsc    2. Have you seen or consulted any other health care providers outside of the [address] [place] [address] since your last visit? Include any pap smears or colon screening. No

## 2019-06-18 NOTE — PROGRESS NOTES
[facility] 6/10-6/15/2019:  Diabetic Ketoacidosis w/o coma   Hospital Discharge Follow-Up    Date/Time:  2019 2:09 PM    Patient was admitted to [facility] on 6/10 and discharged on 6/15/2019 for fatigue/lethargy/diabetic Ketoacidosis without coma. The physician discharge summary was available at the time of outreach. Patient was contacted within 2 business days of discharge. Top Challenges reviewed with the provider   Mayra CGM device  NP Cardiology appt 2019 2:30pm. [facility] Admit Labs:  Last A1c was 13. Blood glucose 904   BUN 27;  Cre 1.20; Troponin 1.86 w/ repeat 3.25 & 3.56   WBC 16.7    Echocardiogram:  56-60%    Method of communication with provider :assessment note  Inpatient RRAT score: 25  Was this a readmission? no   Patient stated reason for the readmission: n/a     Nurse Navigator (NN) contacted the patient in person to perform post hospital discharge assessment. Verified name and  with patient as identifiers. Provided introduction to self, and explanation of the Nurse Navigator role. Reviewed discharge instructions and red flags with patient who verbalized understanding. Patient given an opportunity to ask questions and does not have any further questions or concerns at this time. The patient agrees to contact the PCP office for questions related to their healthcare. NN provided contact information for future reference. Disease Specific:   none    Summary of patient's top problems:  Acute Encephalopathy; ONEYDA. Lactic Acidosis: admited - insulin drip & fluids admin; then transitioned to basal & prandial insulin. Cardiology Referral but found negative. 1. Type I Diabetes:  Patient given continuous glucose monitor-Mayra. 2. Will need Insulin 4x/day with BS 4x/day  3. PCP orders eating timely & snack before bed-which patient states she is not used to doing.       Home Health orders at discharge: [facility] [address] [facility] company: [facility]  Date of initial visit: 6/16/2019    Durable Medical Equipment ordered/company: none  Durable Medical Equipment received: n/a    Barriers to care? Lack of knowledge re disease. Advance Care Planning:   Does patient have an Advance Directive:  not on file; education provided   States she will talk with daughter about it. Healthcare decision maker:  Ronaldo Poole. Medication(s):   New Medications at Discharge: ASA 81mg;  Metoprolol 25mg daily  Changed Medications at Discharge: Insulin aspart 4 units before breakfasst, lunch & dinner; Tresiba Insulin 20u by SC daily; 14u q AM. . Discontinued Medications at Discharge: Tylenol PO;  Clopidogrel PO    Medication reconciliation was performed with patient, who verbalizes understanding of administration of home medications. There were no barriers to obtaining medications identified at this time. Referral to Pharm D needed: no     Current Outpatient Medications   Medication Sig    Blood-Glucose Meter (ACCU-CHEK LORELEI PLUS METER) misc Use to test blood sugar three times a day. Dx.e11.9    glucose blood VI test strips (ACCU-CHEK LORELEI PLUS TEST STRP) strip Use to test blood sugar three times a day. Dx.e11.9    lancets (ACCU-CHEK SOFTCLIX LANCETS) misc Use to test blood sugar three times a day. Dx.e11.9    clopidogrel (PLAVIX) 75 mg tab TAKE 1 TAB BY MOUTH DAILY.  BD ULTRA-FINE SHORT PEN NEEDLE 31 gauge x 5/16\" ndle     insulin degludec (TRESIBA FLEXTOUCH U-100) 100 unit/mL (3 mL) inpn 20 Units by SubCUTAneous route daily. 14 units every morning (Patient taking differently: 14 Units by SubCUTAneous route daily. 14 units every morning)    aspirin delayed-release 81 mg tablet Take 1 Tab by mouth daily.  metoprolol succinate (TOPROL-XL) 25 mg XL tablet Take 1 Tab by mouth daily.  insulin aspart U-100 (NOVOLOG) 100 unit/mL (3 mL) inpn 4 units AC breakfast,lunch, and dinner (Patient taking differently: by SubCUTAneous route. 4 units AC breakfast,lunch, and dinner)    losartan-hydroCHLOROthiazide (HYZAAR) 100-25 mg per tablet Take 1 Tab by mouth daily.  pravastatin (PRAVACHOL) 80 mg tablet Take 1 Tab by mouth daily.  levothyroxine (SYNTHROID) 150 mcg tablet TAKE 1 TABLET BY MOUTH EVERY DAY    amLODIPine (NORVASC) 10 mg tablet TAKE 1 TABLET BY MOUTH EVERY MORNING    brimonidine (ALPHAGAN) 0.15 % ophthalmic solution Administer 1 Drop to both eyes two (2) times a day. No current facility-administered medications for this visit. There are no discontinued medications. [facility] follow up appointment(s):   Future Appointments   Date Time Provider Tawnya Evans   6/19/2019  9:00 AM PAYAM Garrett   6/21/2019 To Be Determined Tom Rumpf, LPN [facility] [address]   6/24/2019 To Be Determined Tom Rumpf, LPN [facility] [address]   6/26/2019 To Be Determined Tom Rumpf, LPN [address]   6[facility]28/2019 [facility]   7/2/2019 To Be Determined Tom Rumpf, LPN [facility] [address]   7/2/2019  2:30 PM Gita Gilbert NP [facility] SAMEER SCHED   7/5/2019 To Be Determined Tom Rumpf, LPN [address]   7/9/2019 To Be Determined Tom Rumpf, LPN [facility] [address]   7/9/2019  4:00 PM Dalila Nava MD [facility] SAMEER SCHED   7/12/2019 To Be Determined [facility]   7/16/2019 To Be Determined Mary Liter [facility]   7/23/2019 To Be Determined Tom Rumpf, LPN [facility]   7/30/2019 To Be Determined Tom Rumpf, LPN [address]   8/13/2019 To Be Determined Tono Russell, RN [facility]      Non-[facility] follow up appointment(s): none  [facility]:  n/a     Goals Addressed                 This Visit's Progress     Attends follow up appointments on schedule   On track     6/17/2019:  Patient scheduled for URBAN Ira visit 6/18/2019. NN LM on VM.    EW     6/18/2019: Patient attended RAJNI RUVALCABA f/u appointment. Scheduled for Cardiology appt 7/2/2019. EW        Patient verbalizes understanding of self -management goals of living with Diabetes. On track     6/18/2019:  Patient will understand CGM: purpose review: will be able to proactively manage glucose highs and lows, plus get added insight into impacts that meals, exercise or even an  illness may have on her glucose levels. Will understand how CGM contributes to better diabetes management by taking out the guesswork that comes in trying to make ttreatment decisions; Will have more than just the number from the blood glucose meter reading. Patient will understand how this will reduce the risk for hypoglycemia.    NN w/ continue to f/u.   EW         goal completion:

## 2019-06-18 NOTE — PROGRESS NOTES
[facility] 6/10-6/15/2019:  Diabetic Ketoacidosis w/o coma   Patient on Patient Assignment discharge report dated 6/17/2019. Undersigned left message on voice mail with my contact information. Patient is scheduled for office visit on 6/18/2019. NN w/ meet patient before office visit for ALEKSANDAR post d/c assessment  Will not sent Get In Touch as letter will not arrive prior ot office appointment.

## 2019-06-19 ENCOUNTER — HOME CARE VISIT (OUTPATIENT)
Dept: SCHEDULING | Facility: HOME HEALTH | Age: 79
End: 2019-06-19
Payer: MEDICARE

## 2019-06-19 VITALS
SYSTOLIC BLOOD PRESSURE: 120 MMHG | TEMPERATURE: 98.4 F | DIASTOLIC BLOOD PRESSURE: 60 MMHG | HEART RATE: 70 BPM | RESPIRATION RATE: 16 BRPM | OXYGEN SATURATION: 97 %

## 2019-06-19 PROCEDURE — G0151 HHCP-SERV OF PT,EA 15 MIN: HCPCS

## 2019-06-19 PROCEDURE — 3331090001 HH PPS REVENUE CREDIT

## 2019-06-19 PROCEDURE — 3331090002 HH PPS REVENUE DEBIT

## 2019-06-19 NOTE — PROGRESS NOTES
[address] [place] [address] and Primary Care  [place] [address]  [address] [place] [address]  Phone:  [phone]  Fax: [phone]       Chief Complaint   Patient presents with   [facility] Follow Up   . SUBJECTIVE:    Keri Singh is a 69 y.o. female Comes in for return visit having been hospitalized most recently for diabetic ketoacidosis. This is related primarily to her dietary indiscretion, as well as not following any recommendations made, particularly as relates to eating at the same time every day and checking blood sugars so changes can indeed be made. Unfortunately the family is not really giving any meaningful assistance that would have an impact on her diabetes. She is currently taking her Levemir 14 units, along with 4 units of her insulin analog a.c. 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 Current Outpatient Medications   Medication Sig Dispense Refill    clopidogrel (PLAVIX) 75 mg tab TAKE 1 TAB BY MOUTH DAILY. 11    BD ULTRA-FINE SHORT PEN NEEDLE 31 gauge x 5/16\" ndle       insulin degludec (TRESIBA FLEXTOUCH U-100) 100 unit/mL (3 mL) inpn 20 Units by SubCUTAneous route daily. 14 units every morning (Patient taking differently: 14 Units by SubCUTAneous route daily. 14 units every morning) 1 Adjustable Dose Pre-filled Pen Syringe 11    aspirin delayed-release 81 mg tablet Take 1 Tab by mouth daily. 30 Tab 11    metoprolol succinate (TOPROL-XL) 25 mg XL tablet Take 1 Tab by mouth daily. 30 Tab 11    insulin aspart U-100 (NOVOLOG) 100 unit/mL (3 mL) inpn 4 units AC breakfast,lunch, and dinner (Patient taking differently: by SubCUTAneous route. 4 units AC breakfast,lunch, and dinner) 1 Adjustable Dose Pre-filled Pen Syringe 11    losartan-hydroCHLOROthiazide (HYZAAR) 100-25 mg per tablet Take 1 Tab by mouth daily.  pravastatin (PRAVACHOL) 80 mg tablet Take 1 Tab by mouth daily.  90 Tab 3    levothyroxine (SYNTHROID) 150 mcg tablet TAKE 1 TABLET BY MOUTH EVERY DAY 30 Tab 11    amLODIPine (NORVASC) 10 mg tablet TAKE 1 TABLET BY MOUTH EVERY MORNING 90 Tab 3    brimonidine (ALPHAGAN) 0.15 % ophthalmic solution Administer 1 Drop to both eyes two (2) times a day.  Blood-Glucose Meter (ACCU-CHEK LORELEI PLUS METER) misc Use to test blood sugar three times a day. Dx.e11.9 1 Each 0    glucose blood VI test strips (ACCU-CHEK LORELEI PLUS TEST STRP) strip Use to test blood sugar three times a day. Dx.e11.9 100 Strip 11    lancets (ACCU-CHEK SOFTCLIX LANCETS) misc Use to test blood sugar three times a day.  Dx.e11.9 100 Each 11     Past Medical History:   Diagnosis Date    [facility]     Heart failure ([facility]     unknown to family    Hypercholesteremia     Hypertension     Stroke ([facility]     Thyroid disease      Past Surgical History:   Procedure Laterality Date    HX GYN      HX HEENT      thyroidectomy    HX HYSTERECTOMY      REMOVAL GALLBLADDER      THYROIDECTOMY       No Known Allergies      REVIEW OF SYSTEMS:  General: negative for - chills or fever  ENT: negative for - headaches, nasal congestion or tinnitus  Respiratory: negative for - cough, hemoptysis, shortness of breath or wheezing  Cardiovascular : negative for - chest pain, edema, palpitations or shortness of breath  Gastrointestinal: negative for - abdominal pain, blood in stools, heartburn or nausea/vomiting  Genito-Urinary: no dysuria, trouble voiding, or hematuria  Musculoskeletal: negative for - gait disturbance, joint pain, joint stiffness or joint swelling  Neurological: no TIA or stroke symptoms  Hematologic: no bruises, no bleeding, no swollen glands  Integument: no lumps, mole changes, nail changes or rash  Endocrine: no malaise/lethargy or unexpected weight changes      Social History     Socioeconomic History    Marital status:      Spouse name: Not on file    Number of children: 1    Years of education: Not on file    Highest education level: Not on file   Occupational History    Occupation: retired   Tobacco Use    Smoking status: Never Smoker    Smokeless tobacco: Never Used   Substance and Sexual Activity    Alcohol use: No     Comment: been years    Drug use: No    Sexual activity: Not Currently     Family History   Problem Relation Age of Onset    Diabetes Father     No Known Problems Mother        OBJECTIVE:    Visit Vitals   5' 5\" (1.651 m)   Wt 164 lb 9.6 oz (74.7 kg)   BMI 27.39 kg/m²     CONSTITUTIONAL: well , well nourished, appears age appropriate  EYES: perrla, eom intact  ENMT:moist mucous membranes, pharynx clear  NECK: supple. Thyroid normal  RESPIRATORY: Chest: clear to ascultation and percussion   CARDIOVASCULAR: Heart: regular rate and rhythm  GASTROINTESTINAL: Abdomen: soft, bowel sounds active  HEMATOLOGIC: no pathological lymph nodes palpated  MUSCULOSKELETAL: Extremities: no edema, pulse 1+   INTEGUMENT: No unusual rashes or suspicious skin lesions noted. Nails appear normal.  NEUROLOGIC: non-focal exam   MENTAL STATUS: alert and oriented, appropriate affect      ASSESSMENT:  1. Hyperglycemia due to type 1 diabetes mellitus ([address]    2. Diabetic ketoacidosis without coma associated with type 1 diabetes mellitus ([address]    3. Vascular dementia without behavioral disturbance    4. Dyslipidemia    5. Essential hypertension    6. Elevated troponin        PLAN:    1. Unfortunately she comes in by herself. Her son dropped her off. I have no one to give information to. She can remember the things I tell her as it relates to how much insulin she needs to take. Her blood sugar today is well over 500, indicating significant dietary indiscretion. I will therefore increase her prandial insulin to 8 units before every meal and her basal insulin from 14 to 18 units. She needs continuous glucose monitoring and does indeed qualify. She checks blood sugars four times a day and takes four injections of insulin daily. I will have to get an [employer] representative [facility]. 2. Her dementia is one of the biggest problems and without a significant support structure needed for diabetic control. 3. BP is adequate. 4. She will continue statin as prescribed. 5. She will return to the office in the next two weeks. 6.   The patient had an elevated troponin level during her hospital stay and saw cardiology. She had a Lexiscan done, which was negative. It was felt she most likely did not have any type of ischemic event. No further studies were deemed necessary. .  Orders Placed This Encounter    clopidogrel (PLAVIX) 75 mg tab    BD ULTRA-FINE SHORT PEN NEEDLE 31 gauge x 5/16\" ndle         Follow-up and Dispositions    · Return in about 2 weeks (around 7/2/2019).            Osmar Salas MD

## 2019-06-20 ENCOUNTER — HOME CARE VISIT (OUTPATIENT)
Dept: HOME HEALTH SERVICES | Facility: HOME HEALTH | Age: 79
End: 2019-06-20
Payer: MEDICARE

## 2019-06-20 LAB
BUN SERPL-MCNC: 13 MG/DL (ref 8–27)
BUN/CREAT SERPL: 15 (ref 12–28)
CALCIUM SERPL-MCNC: 9 MG/DL (ref 8.7–10.3)
CHLORIDE SERPL-SCNC: 97 MMOL/L (ref 96–106)
CO2 SERPL-SCNC: 22 MMOL/L (ref 20–29)
CREAT SERPL-MCNC: 0.86 MG/DL (ref 0.57–1)
GLUCOSE SERPL-MCNC: 425 MG/DL (ref 65–99)
POTASSIUM SERPL-SCNC: 5 MMOL/L (ref 3.5–5.2)
SODIUM SERPL-SCNC: 137 MMOL/L (ref 134–144)

## 2019-06-20 PROCEDURE — 3331090001 HH PPS REVENUE CREDIT

## 2019-06-20 PROCEDURE — 3331090002 HH PPS REVENUE DEBIT

## 2019-06-21 ENCOUNTER — HOME CARE VISIT (OUTPATIENT)
Dept: SCHEDULING | Facility: HOME HEALTH | Age: 79
End: 2019-06-21
Payer: MEDICARE

## 2019-06-21 VITALS
HEART RATE: 76 BPM | TEMPERATURE: 98 F | SYSTOLIC BLOOD PRESSURE: 125 MMHG | DIASTOLIC BLOOD PRESSURE: 76 MMHG | OXYGEN SATURATION: 98 %

## 2019-06-21 PROCEDURE — 3331090002 HH PPS REVENUE DEBIT

## 2019-06-21 PROCEDURE — G0300 HHS/HOSPICE OF LPN EA 15 MIN: HCPCS

## 2019-06-21 PROCEDURE — G0152 HHCP-SERV OF OT,EA 15 MIN: HCPCS

## 2019-06-21 PROCEDURE — 3331090001 HH PPS REVENUE CREDIT

## 2019-06-22 PROCEDURE — 3331090002 HH PPS REVENUE DEBIT

## 2019-06-22 PROCEDURE — 3331090001 HH PPS REVENUE CREDIT

## 2019-06-23 PROCEDURE — 3331090001 HH PPS REVENUE CREDIT

## 2019-06-23 PROCEDURE — 3331090002 HH PPS REVENUE DEBIT

## 2019-06-24 ENCOUNTER — HOME CARE VISIT (OUTPATIENT)
Dept: SCHEDULING | Facility: HOME HEALTH | Age: 79
End: 2019-06-24
Payer: MEDICARE

## 2019-06-24 VITALS
HEART RATE: 69 BPM | DIASTOLIC BLOOD PRESSURE: 58 MMHG | TEMPERATURE: 98.3 F | SYSTOLIC BLOOD PRESSURE: 110 MMHG | OXYGEN SATURATION: 98 %

## 2019-06-24 PROCEDURE — 3331090002 HH PPS REVENUE DEBIT

## 2019-06-24 PROCEDURE — 3331090001 HH PPS REVENUE CREDIT

## 2019-06-24 PROCEDURE — G0300 HHS/HOSPICE OF LPN EA 15 MIN: HCPCS

## 2019-06-25 PROCEDURE — 3331090001 HH PPS REVENUE CREDIT

## 2019-06-25 PROCEDURE — 3331090002 HH PPS REVENUE DEBIT

## 2019-06-26 ENCOUNTER — HOME CARE VISIT (OUTPATIENT)
Dept: SCHEDULING | Facility: HOME HEALTH | Age: 79
End: 2019-06-26
Payer: MEDICARE

## 2019-06-26 PROCEDURE — 3331090002 HH PPS REVENUE DEBIT

## 2019-06-26 PROCEDURE — G0300 HHS/HOSPICE OF LPN EA 15 MIN: HCPCS

## 2019-06-26 PROCEDURE — 3331090001 HH PPS REVENUE CREDIT

## 2019-06-27 PROCEDURE — 3331090002 HH PPS REVENUE DEBIT

## 2019-06-27 PROCEDURE — 3331090001 HH PPS REVENUE CREDIT

## 2019-06-28 VITALS
TEMPERATURE: 98.6 F | OXYGEN SATURATION: 97 % | HEART RATE: 67 BPM | DIASTOLIC BLOOD PRESSURE: 52 MMHG | SYSTOLIC BLOOD PRESSURE: 120 MMHG

## 2019-06-28 PROCEDURE — 3331090002 HH PPS REVENUE DEBIT

## 2019-06-28 PROCEDURE — 3331090001 HH PPS REVENUE CREDIT

## 2019-06-29 ENCOUNTER — HOME CARE VISIT (OUTPATIENT)
Dept: SCHEDULING | Facility: HOME HEALTH | Age: 79
End: 2019-06-29
Payer: MEDICARE

## 2019-06-29 PROCEDURE — 3331090001 HH PPS REVENUE CREDIT

## 2019-06-29 PROCEDURE — 3331090002 HH PPS REVENUE DEBIT

## 2019-06-29 PROCEDURE — G0299 HHS/HOSPICE OF RN EA 15 MIN: HCPCS

## 2019-06-30 VITALS
DIASTOLIC BLOOD PRESSURE: 68 MMHG | HEART RATE: 70 BPM | RESPIRATION RATE: 18 BRPM | TEMPERATURE: 98.2 F | OXYGEN SATURATION: 98 % | SYSTOLIC BLOOD PRESSURE: 148 MMHG

## 2019-06-30 PROCEDURE — 3331090002 HH PPS REVENUE DEBIT

## 2019-06-30 PROCEDURE — 3331090001 HH PPS REVENUE CREDIT

## 2019-07-01 PROCEDURE — 3331090002 HH PPS REVENUE DEBIT

## 2019-07-01 PROCEDURE — 3331090001 HH PPS REVENUE CREDIT

## 2019-07-02 ENCOUNTER — OFFICE VISIT (OUTPATIENT)
Dept: CARDIOLOGY CLINIC | Age: 79
End: 2019-07-02

## 2019-07-02 VITALS
HEIGHT: 65 IN | DIASTOLIC BLOOD PRESSURE: 52 MMHG | RESPIRATION RATE: 16 BRPM | BODY MASS INDEX: 27.04 KG/M2 | WEIGHT: 162.3 LBS | SYSTOLIC BLOOD PRESSURE: 120 MMHG | OXYGEN SATURATION: 98 % | HEART RATE: 71 BPM

## 2019-07-02 DIAGNOSIS — E78.5 DYSLIPIDEMIA: Primary | ICD-10-CM

## 2019-07-02 PROCEDURE — 3331090002 HH PPS REVENUE DEBIT

## 2019-07-02 PROCEDURE — 3331090001 HH PPS REVENUE CREDIT

## 2019-07-02 RX ORDER — FENOFIBRATE 145 MG/1
145 TABLET, COATED ORAL DAILY
Status: ON HOLD | COMMUNITY
Start: 2019-03-26 | End: 2019-12-01

## 2019-07-02 NOTE — PROGRESS NOTES
1. Have you been to the ER, urgent care clinic since your last visit? Hospitalized since your last visit? Yes When: on 6/10/19 for DKA    2. Have you seen or consulted any other health care providers outside of the [address] [place] [address] since your last visit? Include any pap smears or colon screening.  No    Chief Complaint   Patient presents with   [facility] Follow Up     for DKA

## 2019-07-02 NOTE — PROGRESS NOTES
Greg Regalado DNP, ANP-BC  Subjective/HPI:     Johnny Huntley is a 69 y.o. female is here for hospital follow-up. Admitted for DKA [address] due to elevated troponin III 0.56 cardiology was consulted. Negative ischemia nuclear stress test, echocardiogram ejection fraction 60% mild TR. Denies dyspnea on exertion chest pain shortness of breath lightheadedness or dizziness. Taking insulin as directed, intermittently missing checking her Accu-Cheks. Previously had Tahir Cart monitor however cost of sensor is greater than $100 a month prohibitive for patient. PCP Provider  Baldev Olivo MD  Past Medical History:   Diagnosis Date    Diabetes ([address]     Heart failure ([address]     unknown to family    Hypercholesteremia     Hypertension     Stroke ([address]     Thyroid disease       Past Surgical History:   Procedure Laterality Date    HX GYN      HX HEENT      thyroidectomy    HX HYSTERECTOMY      REMOVAL GALLBLADDER      THYROIDECTOMY       No Known Allergies   Family History   Problem Relation Age of Onset    Diabetes Father     No Known Problems Mother       Current Outpatient Medications   Medication Sig    fenofibrate nanocrystallized (TRICOR) 145 mg tablet Take 145 mg by mouth daily.  Blood-Glucose Meter (ACCU-CHEK LORELEI PLUS METER) misc Use to test blood sugar three times a day. Dx.e11.9    glucose blood VI test strips (ACCU-CHEK LORELEI PLUS TEST STRP) strip Use to test blood sugar three times a day. Dx.e11.9    lancets (ACCU-CHEK SOFTCLIX LANCETS) misc Use to test blood sugar three times a day. Dx.e11.9    clopidogrel (PLAVIX) 75 mg tab TAKE 1 TAB BY MOUTH DAILY.  BD ULTRA-FINE SHORT PEN NEEDLE 31 gauge x 5/16\" ndle     insulin degludec (TRESIBA FLEXTOUCH U-100) 100 unit/mL (3 mL) inpn 20 Units by SubCUTAneous route daily. 14 units every morning (Patient taking differently: 14 Units by SubCUTAneous route daily.  14 units every morning)    aspirin delayed-release 81 mg tablet Take 1 Tab by mouth daily.  metoprolol succinate (TOPROL-XL) 25 mg XL tablet Take 1 Tab by mouth daily.  insulin aspart U-100 (NOVOLOG) 100 unit/mL (3 mL) inpn 4 units AC breakfast,lunch, and dinner (Patient taking differently: by SubCUTAneous route. 4 units AC breakfast,lunch, and dinner)    losartan-hydroCHLOROthiazide (HYZAAR) 100-25 mg per tablet Take 1 Tab by mouth daily.  pravastatin (PRAVACHOL) 80 mg tablet Take 1 Tab by mouth daily.  levothyroxine (SYNTHROID) 150 mcg tablet TAKE 1 TABLET BY MOUTH EVERY DAY    amLODIPine (NORVASC) 10 mg tablet TAKE 1 TABLET BY MOUTH EVERY MORNING    brimonidine (ALPHAGAN) 0.15 % ophthalmic solution Administer 1 Drop to both eyes two (2) times a day. No current facility-administered medications for this visit.        Vitals:    07/02/19 1413 07/02/19 1424   BP: 118/52 120/52   Pulse: 71    Resp: 16    SpO2: 98%    Weight: 162 lb 4.8 oz (73.6 kg)    Height: 5' 5\" (1.651 m)      Social History     Socioeconomic History    Marital status:      Spouse name: Not on file    Number of children: 1    Years of education: Not on file    Highest education level: Not on file   Occupational History    Occupation: retired   Social Needs    Financial resource strain: Not on file    Food insecurity:     Worry: Not on file     Inability: Not on file   [email or web address] needs:     Medical: Not on file     Non-medical: Not on file   Tobacco Use    Smoking status: Never Smoker    Smokeless tobacco: Never Used   Substance and Sexual Activity    Alcohol use: No     Comment: been years    Drug use: No    Sexual activity: Not Currently   Lifestyle    Physical activity:     Days per week: Not on file     Minutes per session: Not on file    Stress: Not on file   Relationships    Social connections:     Talks on phone: Not on file     Gets together: Not on file     Attends L[employer]-day service: Not on file     Active member of club or organization: Not on file     Attends meetings of clubs or organizations: Not on file     Relationship status: Not on file    Intimate partner violence:     Fear of current or ex partner: Not on file     Emotionally abused: Not on file     Physically abused: Not on file     Forced sexual activity: Not on file   Other Topics Concern    Not on file   Social History Narrative    Not on file       I have reviewed the nurses notes, vitals, problem list, allergy list, medical history, family, social history and medications. Review of Symptoms:    General: Pt denies excessive weight gain or loss. Pt is able to conduct ADL's  HEENT: Denies blurred vision, headaches, epistaxis and difficulty swallowing. Respiratory: Denies shortness of breath, AGUILERA, wheezing or stridor. Cardiovascular: Denies precordial pain, palpitations, edema or PND  Gastrointestinal: Denies poor appetite, indigestion, abdominal pain or blood in stool  Musculoskeletal: Denies pain or swelling from muscles or joints  Neurologic: Denies tremor, paresthesias, or sensory motor disturbance  Skin: Denies rash, itching or texture change. Physical Exam:      General: Well developed, in no acute distress, cooperative and alert  HEENT: No carotid bruits, no JVD, trach is midline. Neck Supple, PEERL, EOM intact. Heart:  Normal S1/S2 negative S3 or S4. Regular, no murmur, gallop or rub.   Respiratory: Clear bilaterally x 4, no wheezing or rales  Abdomen:   Soft, non-tender, no masses, bowel sounds are active.   Extremities:  No edema, normal cap refill, no cyanosis, atraumatic. Neuro: A&Ox3, speech clear, gait stable. Skin: Skin color is normal. No rashes or lesions.  Non diaphoretic  Vascular: 2+ pulses symmetric in all extremities    Cardiographics    ECG: Sinus rhythm  Results for orders placed or performed during the hospital encounter of 06/10/19   EKG, 12 LEAD, INITIAL   Result Value Ref Range    Ventricular Rate 89 BPM    Atrial Rate 89 BPM    P-R Interval 134 ms    QRS Duration 88 ms Q-T Interval 366 ms    QTC Calculation (Bezet) 445 ms    Calculated P Axis 38 degrees    Calculated R Axis -37 degrees    Calculated T Axis 29 degrees    Diagnosis       Normal sinus rhythm  Left axis deviation  Confirmed by Dayami Powell ([id number]) on 6/11/2019 8:03:45 PM           Cardiology Labs:  Lab Results   Component Value Date/Time    Cholesterol, total 96 06/11/2019 04:20 AM    HDL Cholesterol 67 06/11/2019 04:20 AM    LDL, calculated 19 06/11/2019 04:20 AM    Triglyceride 50 06/11/2019 04:20 AM    CHOL/HDL Ratio 1.4 06/11/2019 04:20 AM       Lab Results   Component Value Date/Time    Sodium 137 06/19/2019 10:56 AM    Potassium 5.0 06/19/2019 10:56 AM    Chloride 97 06/19/2019 10:56 AM    CO2 22 06/19/2019 10:56 AM    Anion gap 7 06/14/2019 05:21 AM    Glucose 425 (H) 06/19/2019 10:56 AM    BUN 13 06/19/2019 10:56 AM    Creatinine 0.86 06/19/2019 10:56 AM    BUN/Creatinine ratio 15 06/19/2019 10:56 AM    GFR est AA 75 06/19/2019 10:56 AM    GFR est non-AA 65 06/19/2019 10:56 AM    Calcium 9.0 06/19/2019 10:56 AM    Bilirubin, total 0.5 06/10/2019 10:30 AM    AST (SGOT) 33 06/10/2019 10:30 AM    Alk. phosphatase 153 (H) 06/10/2019 10:30 AM    Protein, total 6.9 06/10/2019 10:30 AM    Albumin 3.5 06/10/2019 10:30 AM    Globulin 3.4 06/10/2019 10:30 AM    A-G Ratio 1.0 (L) 06/10/2019 10:30 AM    ALT (SGPT) 32 06/10/2019 10:30 AM           Assessment:     Assessment:     Diagnoses and all orders for this visit:    1. Dyslipidemia  -     AMB POC EKG ROUTINE W/ 12 LEADS, INTER & REP        ICD-10-CM ICD-9-CM    1. Dyslipidemia E78.5 272.4 AMB POC EKG ROUTINE W/ 12 LEADS, INTER & REP   2. Diabetes  3. Hypertension:  Orders Placed This Encounter    AMB POC EKG ROUTINE W/ 12 LEADS, INTER & REP     Order Specific Question:   Reason for Exam:     Answer:   Routine    fenofibrate nanocrystallized (TRICOR) 145 mg tablet     Sig: Take 145 mg by mouth daily. Plan:     1.   Elevated troponin: In setting of DKA, nuclear stress test negative for ischemia with normal systolic function no hypokinesis. 2.  Hypertension: Controlled 120/52  3. Hyperlipidemia: On statin therapy followed by primary care  4. Diabetes: Status post DKA admission, compliant with insulin therapy, advised patient to frequently check her blood sugars, for closer surveillance. Stable from cardiac perspective follow-up in 1 year, sooner as needed    Simon Wise NP    This note was created using voice recognition software. Despite editing, there may be syntax errors.

## 2019-07-03 ENCOUNTER — HOME CARE VISIT (OUTPATIENT)
Dept: SCHEDULING | Facility: HOME HEALTH | Age: 79
End: 2019-07-03
Payer: MEDICARE

## 2019-07-03 PROCEDURE — G0300 HHS/HOSPICE OF LPN EA 15 MIN: HCPCS

## 2019-07-03 PROCEDURE — 3331090001 HH PPS REVENUE CREDIT

## 2019-07-03 PROCEDURE — 3331090002 HH PPS REVENUE DEBIT

## 2019-07-04 PROCEDURE — 3331090001 HH PPS REVENUE CREDIT

## 2019-07-04 PROCEDURE — 3331090002 HH PPS REVENUE DEBIT

## 2019-07-05 VITALS
TEMPERATURE: 98.6 F | SYSTOLIC BLOOD PRESSURE: 164 MMHG | OXYGEN SATURATION: 98 % | OXYGEN SATURATION: 98 % | DIASTOLIC BLOOD PRESSURE: 66 MMHG | DIASTOLIC BLOOD PRESSURE: 60 MMHG | SYSTOLIC BLOOD PRESSURE: 142 MMHG | TEMPERATURE: 97.9 F | HEART RATE: 74 BPM | HEART RATE: 67 BPM

## 2019-07-05 PROCEDURE — 3331090002 HH PPS REVENUE DEBIT

## 2019-07-05 PROCEDURE — 3331090001 HH PPS REVENUE CREDIT

## 2019-07-06 PROCEDURE — 3331090001 HH PPS REVENUE CREDIT

## 2019-07-06 PROCEDURE — 3331090002 HH PPS REVENUE DEBIT

## 2019-07-07 PROCEDURE — 3331090002 HH PPS REVENUE DEBIT

## 2019-07-07 PROCEDURE — 3331090001 HH PPS REVENUE CREDIT

## 2019-07-08 PROCEDURE — 3331090002 HH PPS REVENUE DEBIT

## 2019-07-08 PROCEDURE — 3331090001 HH PPS REVENUE CREDIT

## 2019-07-09 ENCOUNTER — OFFICE VISIT (OUTPATIENT)
Dept: INTERNAL MEDICINE CLINIC | Age: 79
End: 2019-07-09

## 2019-07-09 VITALS
HEIGHT: 65 IN | TEMPERATURE: 98.7 F | SYSTOLIC BLOOD PRESSURE: 136 MMHG | RESPIRATION RATE: 16 BRPM | BODY MASS INDEX: 27.38 KG/M2 | DIASTOLIC BLOOD PRESSURE: 65 MMHG | HEART RATE: 64 BPM | WEIGHT: 164.3 LBS | OXYGEN SATURATION: 97 %

## 2019-07-09 DIAGNOSIS — E78.5 DYSLIPIDEMIA: ICD-10-CM

## 2019-07-09 DIAGNOSIS — E10.65 HYPERGLYCEMIA DUE TO TYPE 1 DIABETES MELLITUS (HCC): Primary | ICD-10-CM

## 2019-07-09 DIAGNOSIS — R41.3 MEMORY DEFICIT: ICD-10-CM

## 2019-07-09 DIAGNOSIS — I10 ESSENTIAL HYPERTENSION: ICD-10-CM

## 2019-07-09 DIAGNOSIS — E03.2 HYPOTHYROIDISM DUE TO MEDICATION: ICD-10-CM

## 2019-07-09 PROBLEM — R77.8 ELEVATED TROPONIN: Status: RESOLVED | Noted: 2019-06-11 | Resolved: 2019-07-09

## 2019-07-09 PROCEDURE — 3331090001 HH PPS REVENUE CREDIT

## 2019-07-09 PROCEDURE — 3331090002 HH PPS REVENUE DEBIT

## 2019-07-09 NOTE — PROGRESS NOTES
Chief Complaint   Patient presents with    Blood sugar problem     follow up      1. Have you been to the ER, urgent care clinic since your last visit? Hospitalized since your last visit? No    2. Have you seen or consulted any other health care providers outside of the [address] [place] [address] since your last visit? Include any pap smears or colon screening.  No

## 2019-07-10 PROCEDURE — 3331090002 HH PPS REVENUE DEBIT

## 2019-07-10 PROCEDURE — 3331090001 HH PPS REVENUE CREDIT

## 2019-07-10 NOTE — PROGRESS NOTES
[facility] and Primary Care  [place] [address]  [address] [facility] [address]  Phone:  [phone]  Fax: [phone]       Chief Complaint   Patient presents with    Blood sugar problem     follow up    . SUBJECTIVE:    Andi Ellington is a 69 y.o. female Comes in for return visit stating that she is doing well. She is not accompanied by any relative today. I asked her specifically about her fasting blood sugars and she states they are between 70 and 85. Her a.c. dinner sugars are in the 150 range and h.s. sugars are in the 200 range. She has not had any interventional hypoglycemia. This usually means she is hyperglycemic. She is currently taking Tresiba 14 units every morning and prandial insulin, 3 units, which in reality should be 4 units a.c. meals. She did see a cardiologist and from a cardiac standpoint she is quite stable. She has a past history of dyslipidemia, as well as hypothyroidism, along with her defect in short term memory, felt to be early neurovascular dementia. Current Outpatient Medications   Medication Sig Dispense Refill    fenofibrate nanocrystallized (TRICOR) 145 mg tablet Take 145 mg by mouth daily.  Blood-Glucose Meter (ACCU-CHEK LORELEI PLUS METER) misc Use to test blood sugar three times a day. Dx.e11.9 1 Each 0    glucose blood VI test strips (ACCU-CHEK LORELEI PLUS TEST STRP) strip Use to test blood sugar three times a day. Dx.e11.9 100 Strip 11    lancets (ACCU-CHEK SOFTCLIX LANCETS) misc Use to test blood sugar three times a day. Dx.e11.9 100 Each 11    clopidogrel (PLAVIX) 75 mg tab TAKE 1 TAB BY MOUTH DAILY. 11    BD ULTRA-FINE SHORT PEN NEEDLE 31 gauge x 5/16\" ndle       insulin degludec (TRESIBA FLEXTOUCH U-100) 100 unit/mL (3 mL) inpn 20 Units by SubCUTAneous route daily. 14 units every morning (Patient taking differently: 14 Units by SubCUTAneous route daily.  14 units every morning) 1 Adjustable Dose Pre-filled Pen Syringe 11  aspirin delayed-release 81 mg tablet Take 1 Tab by mouth daily. 30 Tab 11    metoprolol succinate (TOPROL-XL) 25 mg XL tablet Take 1 Tab by mouth daily. 30 Tab 11    insulin aspart U-100 (NOVOLOG) 100 unit/mL (3 mL) inpn 4 units AC breakfast,lunch, and dinner (Patient taking differently: by SubCUTAneous route. 4 units AC breakfast,lunch, and dinner) 1 Adjustable Dose Pre-filled Pen Syringe 11    losartan-hydroCHLOROthiazide (HYZAAR) 100-25 mg per tablet Take 1 Tab by mouth daily.  pravastatin (PRAVACHOL) 80 mg tablet Take 1 Tab by mouth daily. 90 Tab 3    levothyroxine (SYNTHROID) 150 mcg tablet TAKE 1 TABLET BY MOUTH EVERY DAY 30 Tab 11    amLODIPine (NORVASC) 10 mg tablet TAKE 1 TABLET BY MOUTH EVERY MORNING 90 Tab 3    brimonidine (ALPHAGAN) 0.15 % ophthalmic solution Administer 1 Drop to both eyes two (2) times a day.        Past Medical History:   Diagnosis Date    [facility]     Heart failure ([facility]     unknown to family    Hypercholesteremia     Hypertension     Stroke ([facility]     Thyroid disease      Past Surgical History:   Procedure Laterality Date    HX GYN      HX HEENT      thyroidectomy    HX HYSTERECTOMY      REMOVAL GALLBLADDER      THYROIDECTOMY       No Known Allergies      REVIEW OF SYSTEMS:  General: negative for - chills or fever  ENT: negative for - headaches, nasal congestion or tinnitus  Respiratory: negative for - cough, hemoptysis, shortness of breath or wheezing  Cardiovascular : negative for - chest pain, edema, palpitations or shortness of breath  Gastrointestinal: negative for - abdominal pain, blood in stools, heartburn or nausea/vomiting  Genito-Urinary: no dysuria, trouble voiding, or hematuria  Musculoskeletal: negative for - gait disturbance, joint pain, joint stiffness or joint swelling  Neurological: no TIA or stroke symptoms  Hematologic: no bruises, no bleeding, no swollen glands  Integument: no lumps, mole changes, nail changes or rash  Endocrine: no malaise/lethargy or unexpected weight changes      Social History     Socioeconomic History    Marital status:      Spouse name: Not on file    Number of children: 1    Years of education: Not on file    Highest education level: Not on file   Occupational History    Occupation: retired   Tobacco Use    Smoking status: Never Smoker    Smokeless tobacco: Never Used   Substance and Sexual Activity    Alcohol use: No     Comment: been years    Drug use: No    Sexual activity: Not Currently     Family History   Problem Relation Age of Onset    Diabetes Father     No Known Problems Mother        OBJECTIVE:    Visit Vitals  /65   Pulse 64   Temp 98.7 °F (37.1 °C) (Oral)   Resp 16   Ht 5' 5\" (1.651 m)   Wt 164 lb 4.8 oz (74.5 kg)   SpO2 97%   BMI 27.34 kg/m²     CONSTITUTIONAL: well , well nourished, appears age appropriate  EYES: perrla, eom intact  ENMT:moist mucous membranes, pharynx clear  NECK: supple. Thyroid normal  RESPIRATORY: Chest: clear to ascultation and percussion   CARDIOVASCULAR: Heart: regular rate and rhythm  GASTROINTESTINAL: Abdomen: soft, bowel sounds active  HEMATOLOGIC: no pathological lymph nodes palpated  MUSCULOSKELETAL: Extremities: no edema, pulse 1+   INTEGUMENT: No unusual rashes or suspicious skin lesions noted. Nails appear normal.  NEUROLOGIC: non-focal exam   MENTAL STATUS: alert and oriented, appropriate affect      ASSESSMENT:  1. Hyperglycemia due to type 1 diabetes mellitus ([address]    2. Essential hypertension    3. Hypothyroidism due to medication    4. Dyslipidemia    5. Memory deficit        PLAN:    1. As far as her diabetes is concerned, hopefully she is at reasonable goal.  She needs to have a Chaya for continuous glucose monitoring, but unfortunately I cannot get her insurance to pay for this. 2. BP is excellent, no adjustments are made. 3. She will continue thyroid supplement as prescribed. Last TSH was excellent.   4. She will also continue statin in view of her primary cardiovascular risk prevention. 5. She continues with short term memory deficits, felt to be related to early dementia. She really needs more supervision. Her diabetes will not get well controlled until she is more supervised, which probably will not happen. .  Orders Placed This Encounter    METABOLIC PANEL, BASIC         Follow-up and Dispositions    · Return in about 3 weeks (around 7/30/2019).            Margarito Ugarte MD

## 2019-07-11 LAB
BUN SERPL-MCNC: 15 MG/DL (ref 8–27)
BUN/CREAT SERPL: 20 (ref 12–28)
CALCIUM SERPL-MCNC: 9 MG/DL (ref 8.7–10.3)
CHLORIDE SERPL-SCNC: 99 MMOL/L (ref 96–106)
CO2 SERPL-SCNC: 18 MMOL/L (ref 20–29)
CREAT SERPL-MCNC: 0.74 MG/DL (ref 0.57–1)
GLUCOSE SERPL-MCNC: 452 MG/DL (ref 65–99)
POTASSIUM SERPL-SCNC: 4.6 MMOL/L (ref 3.5–5.2)
SODIUM SERPL-SCNC: 138 MMOL/L (ref 134–144)

## 2019-07-11 PROCEDURE — 3331090002 HH PPS REVENUE DEBIT

## 2019-07-11 PROCEDURE — 3331090001 HH PPS REVENUE CREDIT

## 2019-07-12 ENCOUNTER — HOME CARE VISIT (OUTPATIENT)
Dept: SCHEDULING | Facility: HOME HEALTH | Age: 79
End: 2019-07-12
Payer: MEDICARE

## 2019-07-12 PROCEDURE — 3331090002 HH PPS REVENUE DEBIT

## 2019-07-12 PROCEDURE — 3331090001 HH PPS REVENUE CREDIT

## 2019-07-13 PROCEDURE — 3331090001 HH PPS REVENUE CREDIT

## 2019-07-13 PROCEDURE — 3331090002 HH PPS REVENUE DEBIT

## 2019-07-14 PROCEDURE — 3331090001 HH PPS REVENUE CREDIT

## 2019-07-14 PROCEDURE — 3331090002 HH PPS REVENUE DEBIT

## 2019-07-15 PROCEDURE — 3331090002 HH PPS REVENUE DEBIT

## 2019-07-15 PROCEDURE — 3331090001 HH PPS REVENUE CREDIT

## 2019-07-16 ENCOUNTER — HOME CARE VISIT (OUTPATIENT)
Dept: SCHEDULING | Facility: HOME HEALTH | Age: 79
End: 2019-07-16
Payer: MEDICARE

## 2019-07-16 VITALS
TEMPERATURE: 98.4 F | RESPIRATION RATE: 16 BRPM | DIASTOLIC BLOOD PRESSURE: 70 MMHG | SYSTOLIC BLOOD PRESSURE: 130 MMHG | HEART RATE: 68 BPM | OXYGEN SATURATION: 99 %

## 2019-07-16 PROCEDURE — 3331090001 HH PPS REVENUE CREDIT

## 2019-07-16 PROCEDURE — 3331090002 HH PPS REVENUE DEBIT

## 2019-07-16 PROCEDURE — G0299 HHS/HOSPICE OF RN EA 15 MIN: HCPCS

## 2019-07-17 PROCEDURE — 3331090001 HH PPS REVENUE CREDIT

## 2019-07-17 PROCEDURE — 3331090002 HH PPS REVENUE DEBIT

## 2019-07-18 PROCEDURE — 3331090002 HH PPS REVENUE DEBIT

## 2019-07-18 PROCEDURE — 3331090001 HH PPS REVENUE CREDIT

## 2019-07-19 PROCEDURE — 3331090001 HH PPS REVENUE CREDIT

## 2019-07-19 PROCEDURE — 3331090002 HH PPS REVENUE DEBIT

## 2019-07-20 PROCEDURE — 3331090002 HH PPS REVENUE DEBIT

## 2019-07-20 PROCEDURE — 3331090001 HH PPS REVENUE CREDIT

## 2019-07-21 PROCEDURE — 3331090001 HH PPS REVENUE CREDIT

## 2019-07-21 PROCEDURE — 3331090002 HH PPS REVENUE DEBIT

## 2019-07-22 PROCEDURE — 3331090002 HH PPS REVENUE DEBIT

## 2019-07-22 PROCEDURE — 3331090001 HH PPS REVENUE CREDIT

## 2019-07-23 PROCEDURE — 3331090002 HH PPS REVENUE DEBIT

## 2019-07-23 PROCEDURE — 3331090001 HH PPS REVENUE CREDIT

## 2019-07-24 ENCOUNTER — HOME CARE VISIT (OUTPATIENT)
Dept: SCHEDULING | Facility: HOME HEALTH | Age: 79
End: 2019-07-24
Payer: MEDICARE

## 2019-07-24 PROCEDURE — 3331090002 HH PPS REVENUE DEBIT

## 2019-07-24 PROCEDURE — G0300 HHS/HOSPICE OF LPN EA 15 MIN: HCPCS

## 2019-07-24 PROCEDURE — 3331090001 HH PPS REVENUE CREDIT

## 2019-07-25 VITALS
TEMPERATURE: 98.3 F | SYSTOLIC BLOOD PRESSURE: 118 MMHG | OXYGEN SATURATION: 98 % | DIASTOLIC BLOOD PRESSURE: 50 MMHG | HEART RATE: 72 BPM

## 2019-07-25 PROCEDURE — 3331090001 HH PPS REVENUE CREDIT

## 2019-07-25 PROCEDURE — 3331090002 HH PPS REVENUE DEBIT

## 2019-07-26 PROCEDURE — 3331090002 HH PPS REVENUE DEBIT

## 2019-07-26 PROCEDURE — 3331090001 HH PPS REVENUE CREDIT

## 2019-07-27 PROCEDURE — 3331090002 HH PPS REVENUE DEBIT

## 2019-07-27 PROCEDURE — 3331090001 HH PPS REVENUE CREDIT

## 2019-07-28 PROCEDURE — 3331090002 HH PPS REVENUE DEBIT

## 2019-07-28 PROCEDURE — 3331090001 HH PPS REVENUE CREDIT

## 2019-07-29 PROCEDURE — 3331090001 HH PPS REVENUE CREDIT

## 2019-07-29 PROCEDURE — 3331090002 HH PPS REVENUE DEBIT

## 2019-07-30 PROCEDURE — 3331090001 HH PPS REVENUE CREDIT

## 2019-07-30 PROCEDURE — 3331090002 HH PPS REVENUE DEBIT

## 2019-07-31 PROCEDURE — 3331090002 HH PPS REVENUE DEBIT

## 2019-07-31 PROCEDURE — 3331090001 HH PPS REVENUE CREDIT

## 2019-08-01 ENCOUNTER — HOME CARE VISIT (OUTPATIENT)
Dept: SCHEDULING | Facility: HOME HEALTH | Age: 79
End: 2019-08-01
Payer: MEDICARE

## 2019-08-01 PROCEDURE — 3331090001 HH PPS REVENUE CREDIT

## 2019-08-01 PROCEDURE — 3331090002 HH PPS REVENUE DEBIT

## 2019-08-01 PROCEDURE — G0300 HHS/HOSPICE OF LPN EA 15 MIN: HCPCS

## 2019-08-02 PROCEDURE — 3331090001 HH PPS REVENUE CREDIT

## 2019-08-02 PROCEDURE — 3331090002 HH PPS REVENUE DEBIT

## 2019-08-03 PROCEDURE — 3331090002 HH PPS REVENUE DEBIT

## 2019-08-03 PROCEDURE — 3331090001 HH PPS REVENUE CREDIT

## 2019-08-04 PROCEDURE — 3331090002 HH PPS REVENUE DEBIT

## 2019-08-04 PROCEDURE — 3331090001 HH PPS REVENUE CREDIT

## 2019-08-05 PROCEDURE — 3331090001 HH PPS REVENUE CREDIT

## 2019-08-05 PROCEDURE — 3331090002 HH PPS REVENUE DEBIT

## 2019-08-06 PROCEDURE — 3331090002 HH PPS REVENUE DEBIT

## 2019-08-06 PROCEDURE — 3331090001 HH PPS REVENUE CREDIT

## 2019-08-07 PROCEDURE — 3331090002 HH PPS REVENUE DEBIT

## 2019-08-07 PROCEDURE — 3331090001 HH PPS REVENUE CREDIT

## 2019-08-08 PROCEDURE — 3331090001 HH PPS REVENUE CREDIT

## 2019-08-08 PROCEDURE — 3331090002 HH PPS REVENUE DEBIT

## 2019-08-09 PROCEDURE — 3331090002 HH PPS REVENUE DEBIT

## 2019-08-09 PROCEDURE — 3331090001 HH PPS REVENUE CREDIT

## 2019-08-10 PROCEDURE — 3331090002 HH PPS REVENUE DEBIT

## 2019-08-10 PROCEDURE — 3331090001 HH PPS REVENUE CREDIT

## 2019-08-11 PROCEDURE — 3331090002 HH PPS REVENUE DEBIT

## 2019-08-11 PROCEDURE — 3331090001 HH PPS REVENUE CREDIT

## 2019-08-12 ENCOUNTER — HOME CARE VISIT (OUTPATIENT)
Dept: SCHEDULING | Facility: HOME HEALTH | Age: 79
End: 2019-08-12

## 2019-08-12 ENCOUNTER — HOME CARE VISIT (OUTPATIENT)
Dept: HOME HEALTH SERVICES | Facility: HOME HEALTH | Age: 79
End: 2019-08-12
Payer: MEDICARE

## 2019-08-12 PROCEDURE — G0299 HHS/HOSPICE OF RN EA 15 MIN: HCPCS

## 2019-08-12 PROCEDURE — 3331090001 HH PPS REVENUE CREDIT

## 2019-08-12 PROCEDURE — 3331090002 HH PPS REVENUE DEBIT

## 2019-08-13 ENCOUNTER — PATIENT OUTREACH (OUTPATIENT)
Dept: INTERNAL MEDICINE CLINIC | Age: 79
End: 2019-08-13

## 2019-08-13 PROCEDURE — 3331090002 HH PPS REVENUE DEBIT

## 2019-08-13 PROCEDURE — 3331090001 HH PPS REVENUE CREDIT

## 2019-08-13 NOTE — PROGRESS NOTES
[facility] 6/10-6/15/2019:  DKA w/o coma f/u Case Closed    Patient is capable and able to handle BS and Vital signs management with the assistance of ongoing [place]; patient/caregiven t perform blood glucose testing and record in daily log per [place] Nurse. NN w/ close case. Resolve episodes  Remove name from Team List.  Completion of goals. Michelle Henson

## 2019-08-14 PROCEDURE — 3331090001 HH PPS REVENUE CREDIT

## 2019-08-14 PROCEDURE — 3331090002 HH PPS REVENUE DEBIT

## 2019-08-15 VITALS
TEMPERATURE: 97.8 F | SYSTOLIC BLOOD PRESSURE: 122 MMHG | RESPIRATION RATE: 16 BRPM | HEART RATE: 80 BPM | OXYGEN SATURATION: 98 % | DIASTOLIC BLOOD PRESSURE: 78 MMHG

## 2019-10-15 ENCOUNTER — OFFICE VISIT (OUTPATIENT)
Dept: INTERNAL MEDICINE CLINIC | Age: 79
End: 2019-10-15

## 2019-10-15 VITALS
BODY MASS INDEX: 27.07 KG/M2 | WEIGHT: 162.5 LBS | DIASTOLIC BLOOD PRESSURE: 60 MMHG | HEART RATE: 70 BPM | RESPIRATION RATE: 16 BRPM | TEMPERATURE: 97.8 F | HEIGHT: 65 IN | OXYGEN SATURATION: 98 % | SYSTOLIC BLOOD PRESSURE: 137 MMHG

## 2019-10-15 DIAGNOSIS — E10.65 HYPERGLYCEMIA DUE TO TYPE 1 DIABETES MELLITUS (HCC): Primary | ICD-10-CM

## 2019-10-15 DIAGNOSIS — F01.50 VASCULAR DEMENTIA WITHOUT BEHAVIORAL DISTURBANCE (HCC): ICD-10-CM

## 2019-10-15 DIAGNOSIS — E03.2 HYPOTHYROIDISM DUE TO MEDICATION: ICD-10-CM

## 2019-10-15 DIAGNOSIS — I10 ESSENTIAL HYPERTENSION: ICD-10-CM

## 2019-10-15 DIAGNOSIS — E78.5 DYSLIPIDEMIA: ICD-10-CM

## 2019-10-15 PROBLEM — N17.9 AKI (ACUTE KIDNEY INJURY) (HCC): Status: RESOLVED | Noted: 2019-06-10 | Resolved: 2019-10-15

## 2019-10-15 NOTE — PROGRESS NOTES
Chief Complaint   Patient presents with    Diabetes     3 month follow up      1. Have you been to the ER, urgent care clinic since your last visit? Hospitalized since your last visit? No    2. Have you seen or consulted any other health care providers outside of the [address] [place] [address] since your last visit? Include any pap smears or colon screening.  No

## 2019-10-16 LAB
ALBUMIN/CREAT UR: <6.9 MG/G CREAT (ref 0–30)
APO B SERPL-MCNC: 68 MG/DL
BUN SERPL-MCNC: 20 MG/DL (ref 8–27)
BUN/CREAT SERPL: 30 (ref 12–28)
CALCIUM SERPL-MCNC: 9.1 MG/DL (ref 8.7–10.3)
CHLORIDE SERPL-SCNC: 94 MMOL/L (ref 96–106)
CO2 SERPL-SCNC: 20 MMOL/L (ref 20–29)
CREAT SERPL-MCNC: 0.66 MG/DL (ref 0.57–1)
CREAT UR-MCNC: 43.5 MG/DL
EST. AVERAGE GLUCOSE BLD GHB EST-MCNC: 344 MG/DL
GLUCOSE SERPL-MCNC: 344 MG/DL (ref 65–99)
HBA1C MFR BLD: 13.6 % (ref 4.8–5.6)
MICROALBUMIN UR-MCNC: <3 UG/ML
POTASSIUM SERPL-SCNC: 3.9 MMOL/L (ref 3.5–5.2)
SODIUM SERPL-SCNC: 136 MMOL/L (ref 134–144)
TSH SERPL DL<=0.005 MIU/L-ACNC: 14.53 UIU/ML (ref 0.45–4.5)

## 2019-10-16 NOTE — PROGRESS NOTES
[address] [facility]  [place] [address]  [address] [place] [address]  Phone:  [phone]  Fax: [phone]       Chief Complaint   Patient presents with    Diabetes     3 month follow up    . SUBJECTIVE:    Micah Cano is a 69 y.o. female Comes in for return visit stating that she has done well. In reality, she never kept her appointment. She was supposed to return to the office in three weeks and it has now been three months. As far as her diabetes is concerned, she could not remember any blood sugars that she has other than a sporadic one in the 160-170 range, which is totally inconsistent with her previous hemoglobin A1c's. She has not had any interventional hypoglycemia. She could not give me the amount of insulin that she should be taking as far as her prandial insulin is concerned. She continues her [place] at 18 units. She is not eating in a timely fashion as I have suggested to minimize hypoglycemia and glycemic variability. Unfortunately, I have not been able to get her the [address], which she needs and qualifies for for insurance reasons. She denies any shortness of breath. She does have a history of diastolic dysfunction, but she has been well compensated. She has a past history of primary hypertension, dyslipidemia and hypothyroidism. Her biggest problem is her presumed vascular dementia. Her short term memory is extremely poor. She is accompanied by her daughter today. This has been an ongoing problem. Current Outpatient Medications   Medication Sig Dispense Refill    flash glucose sensor (FREESTYLE KEVON 10 DAY SENSOR) kit Does not apply to route 1 Kit 11    flash glucose scanning reader (FREESTYLE KEVON 10 DAY READER) misc Does not apply to route 3 Each 11    fenofibrate nanocrystallized (TRICOR) 145 mg tablet Take 145 mg by mouth daily.       Blood-Glucose Meter (ACCU-CHEK LORELEI PLUS METER) misc Use to test blood sugar three times a day. Dx.e11.9 1 Each 0    glucose blood VI test strips (ACCU-CHEK LORELEI PLUS TEST STRP) strip Use to test blood sugar three times a day. Dx.e11.9 100 Strip 11    lancets (ACCU-CHEK SOFTCLIX LANCETS) misc Use to test blood sugar three times a day. Dx.e11.9 100 Each 11    clopidogrel (PLAVIX) 75 mg tab TAKE 1 TAB BY MOUTH DAILY. 11    BD ULTRA-FINE SHORT PEN NEEDLE 31 gauge x 5/16\" ndle       insulin degludec (TRESIBA FLEXTOUCH U-100) 100 unit/mL (3 mL) inpn 20 Units by SubCUTAneous route daily. 14 units every morning (Patient taking differently: 14 Units by SubCUTAneous route daily. 14 units every morning) 1 Adjustable Dose Pre-filled Pen Syringe 11    aspirin delayed-release 81 mg tablet Take 1 Tab by mouth daily. 30 Tab 11    metoprolol succinate (TOPROL-XL) 25 mg XL tablet Take 1 Tab by mouth daily. 30 Tab 11    insulin aspart U-100 (NOVOLOG) 100 unit/mL (3 mL) inpn 4 units AC breakfast,lunch, and dinner (Patient taking differently: by SubCUTAneous route. 4 units AC breakfast,lunch, and dinner) 1 Adjustable Dose Pre-filled Pen Syringe 11    losartan-hydroCHLOROthiazide (HYZAAR) 100-25 mg per tablet Take 1 Tab by mouth daily.  pravastatin (PRAVACHOL) 80 mg tablet Take 1 Tab by mouth daily. 90 Tab 3    levothyroxine (SYNTHROID) 150 mcg tablet TAKE 1 TABLET BY MOUTH EVERY DAY 30 Tab 11    amLODIPine (NORVASC) 10 mg tablet TAKE 1 TABLET BY MOUTH EVERY MORNING 90 Tab 3    brimonidine (ALPHAGAN) 0.15 % ophthalmic solution Administer 1 Drop to both eyes two (2) times a day.        Past Medical History:   Diagnosis Date    Diabetes ([address]     Heart failure ([address]     unknown to family    Hypercholesteremia     Hypertension     Stroke ([address]     Thyroid disease      Past Surgical History:   Procedure Laterality Date    HX GYN      HX HEENT      thyroidectomy    HX HYSTERECTOMY      REMOVAL GALLBLADDER      THYROIDECTOMY       No Known Allergies      REVIEW OF SYSTEMS:  General: negative for - chills or fever  ENT: negative for - headaches, nasal congestion or tinnitus  Respiratory: negative for - cough, hemoptysis, shortness of breath or wheezing  Cardiovascular : negative for - chest pain, edema, palpitations or shortness of breath  Gastrointestinal: negative for - abdominal pain, blood in stools, heartburn or nausea/vomiting  Genito-Urinary: no dysuria, trouble voiding, or hematuria  Musculoskeletal: negative for - gait disturbance, joint pain, joint stiffness or joint swelling  Neurological: no TIA or stroke symptoms  Hematologic: no bruises, no bleeding, no swollen glands  Integument: no lumps, mole changes, nail changes or rash  Endocrine: no malaise/lethargy or unexpected weight changes      Social History     Socioeconomic History    Marital status:      Spouse name: Not on file    Number of children: 1    Years of education: Not on file    Highest education level: Not on file   Occupational History    Occupation: retired   Tobacco Use    Smoking status: Never Smoker    Smokeless tobacco: Never Used   Substance and Sexual Activity    Alcohol use: No     Comment: been years    Drug use: No    Sexual activity: Not Currently     Family History   Problem Relation Age of Onset    Diabetes Father     No Known Problems Mother        OBJECTIVE:    Visit Vitals  /60   Pulse 70   Temp 97.8 °F (36.6 °C) (Oral)   Resp 16   Ht 5' 5\" (1.651 m)   Wt 162 lb 8 oz (73.7 kg)   SpO2 98%   BMI 27.04 kg/m²     CONSTITUTIONAL: well , well nourished, appears age appropriate  EYES: perrla, eom intact  ENMT:moist mucous membranes, pharynx clear  NECK: supple.  Thyroid normal  RESPIRATORY: Chest: clear to ascultation and percussion   CARDIOVASCULAR: Heart: regular rate and rhythm  GASTROINTESTINAL: Abdomen: soft, bowel sounds active  HEMATOLOGIC: no pathological lymph nodes palpated  MUSCULOSKELETAL: Extremities: no edema, pulse 1+   INTEGUMENT: No unusual rashes or suspicious skin lesions noted. Nails appear normal.  NEUROLOGIC: non-focal exam   MENTAL STATUS: alert and oriented, appropriate affect      ASSESSMENT:  1. Hyperglycemia due to type 1 diabetes mellitus ([facility]    2. Hypothyroidism due to medication    3. Essential hypertension    4. Dyslipidemia    5. Vascular dementia without behavioral disturbance ([facility]        PLAN:    1. The patient presumably has type 1 diabetes. She has had recurrent episodes of ketoacidosis previously. I have not checked her diabetic autoantibodies of late, but the likelihood of them being ________________, particularly at 69years of age, is extremely low. Because she is a type 1 diabetic technically, she needs to have a Mayra device. She takes basal bolus type of insulin and checks her blood sugars four to five times a day. The most important thing I mentioned to her today is the importance of eating at the same time every day. I give her the times and when I ask her to give me the times to repeat that, she could not. Her daughter was there, so she can remember. Breakfast 8-9, lunch 12-1, dinner 5-6, and bedtime snack at 9-10. I remind her of the importance of including meats in her meals. She will now take prandial insulin 4 units a.c. and 18 units of Tresiba every morning. 2. She is hypothyroid and TSH will be assessed for efficacy and compliance. 3. BP is excellent today, no adjustments are made. 4. She will continue statin as prescribed in view of her increased cardiovascular risk. 5. Her dementia is gradually getting worse and she needs help. Unfortunately, the help that she has in her house is just not adequate.     .  Orders Placed This Encounter    MICROALBUMIN, UR, RAND    METABOLIC PANEL, BASIC    HEMOGLOBIN A1C WITH EAG    APOLIPOPROTEIN B    TSH 3RD GENERATION    flash glucose sensor (FREESTYLE MAYRA 10 DAY SENSOR) kit    flash glucose scanning reader (FREESTYLE MAYRA 10 DAY READER) misc         Follow-up and Dispositions    · Return in about 3 weeks (around 11/5/2019).            Alberto Leventhal, MD

## 2019-10-23 DIAGNOSIS — E03.2 HYPOTHYROIDISM DUE TO MEDICATION: Primary | ICD-10-CM

## 2019-10-23 RX ORDER — LEVOTHYROXINE SODIUM 175 UG/1
TABLET ORAL
Qty: 30 TAB | Refills: 11 | Status: ON HOLD | OUTPATIENT
Start: 2019-10-23 | End: 2019-11-22 | Stop reason: SDUPTHER

## 2019-10-24 NOTE — PROGRESS NOTES
Tell patient to increase Tresiba by 4 units--------- make sure she is taking Humalog 4 units before each meal.  New thyroid medication will be called in for her

## 2019-11-01 RX ORDER — LOSARTAN POTASSIUM AND HYDROCHLOROTHIAZIDE 25; 100 MG/1; MG/1
TABLET ORAL
Qty: 90 TAB | Refills: 3 | Status: ON HOLD | OUTPATIENT
Start: 2019-11-01 | End: 2019-12-01

## 2019-11-03 RX ORDER — PRAVASTATIN SODIUM 80 MG/1
TABLET ORAL
Qty: 90 TAB | Refills: 3 | Status: SHIPPED | OUTPATIENT
Start: 2019-11-03 | End: 2020-02-27 | Stop reason: SDUPTHER

## 2019-11-03 RX ORDER — AMLODIPINE BESYLATE 10 MG/1
TABLET ORAL
Qty: 90 TAB | Refills: 3 | Status: SHIPPED | OUTPATIENT
Start: 2019-11-03 | End: 2020-02-27 | Stop reason: SDUPTHER

## 2019-11-05 ENCOUNTER — OFFICE VISIT (OUTPATIENT)
Dept: INTERNAL MEDICINE CLINIC | Age: 79
End: 2019-11-05

## 2019-11-05 VITALS
BODY MASS INDEX: 27.36 KG/M2 | OXYGEN SATURATION: 95 % | HEART RATE: 63 BPM | DIASTOLIC BLOOD PRESSURE: 64 MMHG | SYSTOLIC BLOOD PRESSURE: 104 MMHG | RESPIRATION RATE: 16 BRPM | WEIGHT: 164.2 LBS | HEIGHT: 65 IN | TEMPERATURE: 97.7 F

## 2019-11-05 DIAGNOSIS — E03.2 HYPOTHYROIDISM DUE TO MEDICATION: ICD-10-CM

## 2019-11-05 DIAGNOSIS — E78.5 DYSLIPIDEMIA: ICD-10-CM

## 2019-11-05 DIAGNOSIS — E10.649 HYPOGLYCEMIC UNAWARENESS ASSOCIATED WITH TYPE 1 DIABETES MELLITUS: ICD-10-CM

## 2019-11-05 DIAGNOSIS — I10 ESSENTIAL HYPERTENSION: ICD-10-CM

## 2019-11-05 DIAGNOSIS — E10.65 HYPERGLYCEMIA DUE TO TYPE 1 DIABETES MELLITUS (HCC): Primary | ICD-10-CM

## 2019-11-05 DIAGNOSIS — F01.50 VASCULAR DEMENTIA WITHOUT BEHAVIORAL DISTURBANCE (HCC): ICD-10-CM

## 2019-11-05 NOTE — PROGRESS NOTES
Chief Complaint   Patient presents with    Diabetes     3 week follow up      1. Have you been to the ER, urgent care clinic since your last visit? Hospitalized since your last visit? No    2. Have you seen or consulted any other health care providers outside of the [address] [place] [address] since your last visit? Include any pap smears or colon screening.  No

## 2019-11-06 NOTE — PROGRESS NOTES
[address] [place] [address] and Primary Care  [facility]  [address] [facility] [address]  Phone:  [phone]  Fax: [phone]       Chief Complaint   Patient presents with    Diabetes     3 week follow up    . SUBJECTIVE:    Mike Allen is a 69 y.o. female Comes in for return visit accompanied by her daughter. Her daughter's phone number is [phone]. The patient could not tell me about the status of her Dickey device. As far as her diabetes is concerned, she has had no symptomatic hypoglycemia. Her diabetes is totally out of control based on her last hemoglobin A1c of 13. She currently remains on prandial insulin 4 units a.c. and 20 units of Tresiba. Unfortunately, I have been unable to increase her [place] for fear of developing hypoglycemia because the patient does not eat at the same time every day. She has dementia, which is getting progressively worse with a significant impairment of her short term memory. She has a past history of primary hypertension, dyslipidemia and hypothyroidism. Current Outpatient Medications   Medication Sig Dispense Refill    flash glucose scanning reader (FREESTYLE KEVON 14 DAY READER) misc As directed 1 Each 3    flash glucose sensor (FREESTYLE KEVON 14 DAY SENSOR) kit As directed1 2 Kit 11    amLODIPine (NORVASC) 10 mg tablet TAKE 1 TABLET BY MOUTH EVERY DAY IN THE MORNING 90 Tab 3    pravastatin (PRAVACHOL) 80 mg tablet TAKE 1 TABLET BY MOUTH EVERY DAY 90 Tab 3    losartan-hydroCHLOROthiazide (HYZAAR) 100-25 mg per tablet TAKE 1 TABLET BY MOUTH EVERY DAY 90 Tab 3    levothyroxine (SYNTHROID) 175 mcg tablet TAKE 1 TABLET BY MOUTH EVERY DAY 30 Tab 11    flash glucose sensor (FREESTYLE KEVON 10 DAY SENSOR) kit Does not apply to route 1 Kit 11    flash glucose scanning reader (FREESTYLE KEVON 10 DAY READER) misc Does not apply to route 3 Each 11    fenofibrate nanocrystallized (TRICOR) 145 mg tablet Take 145 mg by mouth daily.  Blood-Glucose Meter (ACCU-CHEK LORELEI PLUS METER) misc Use to test blood sugar three times a day. Dx.e11.9 1 Each 0    glucose blood VI test strips (ACCU-CHEK LORELEI PLUS TEST STRP) strip Use to test blood sugar three times a day. Dx.e11.9 100 Strip 11    lancets (ACCU-CHEK SOFTCLIX LANCETS) misc Use to test blood sugar three times a day. Dx.e11.9 100 Each 11    clopidogrel (PLAVIX) 75 mg tab TAKE 1 TAB BY MOUTH DAILY. 11    BD ULTRA-FINE SHORT PEN NEEDLE 31 gauge x 5/16\" ndle       insulin degludec (TRESIBA FLEXTOUCH U-100) 100 unit/mL (3 mL) inpn 20 Units by SubCUTAneous route daily. 14 units every morning (Patient taking differently: 14 Units by SubCUTAneous route daily. 14 units every morning) 1 Adjustable Dose Pre-filled Pen Syringe 11    aspirin delayed-release 81 mg tablet Take 1 Tab by mouth daily. 30 Tab 11    metoprolol succinate (TOPROL-XL) 25 mg XL tablet Take 1 Tab by mouth daily. 30 Tab 11    insulin aspart U-100 (NOVOLOG) 100 unit/mL (3 mL) inpn 4 units AC breakfast,lunch, and dinner (Patient taking differently: by SubCUTAneous route. 4 units AC breakfast,lunch, and dinner) 1 Adjustable Dose Pre-filled Pen Syringe 11    brimonidine (ALPHAGAN) 0.15 % ophthalmic solution Administer 1 Drop to both eyes two (2) times a day.        Past Medical History:   Diagnosis Date    [facility]     Heart failure ([facility]     unknown to family    Hypercholesteremia     Hypertension     Stroke ([facility]     Thyroid disease      Past Surgical History:   Procedure Laterality Date    HX GYN      HX HEENT      thyroidectomy    HX HYSTERECTOMY      REMOVAL GALLBLADDER      THYROIDECTOMY       No Known Allergies      REVIEW OF SYSTEMS:  General: negative for - chills or fever  ENT: negative for - headaches, nasal congestion or tinnitus  Respiratory: negative for - cough, hemoptysis, shortness of breath or wheezing  Cardiovascular : negative for - chest pain, edema, palpitations or shortness of breath  Gastrointestinal: negative for - abdominal pain, blood in stools, heartburn or nausea/vomiting  Genito-Urinary: no dysuria, trouble voiding, or hematuria  Musculoskeletal: negative for - gait disturbance, joint pain, joint stiffness or joint swelling  Neurological: no TIA or stroke symptoms  Hematologic: no bruises, no bleeding, no swollen glands  Integument: no lumps, mole changes, nail changes or rash  Endocrine: no malaise/lethargy or unexpected weight changes      Social History     Socioeconomic History    Marital status:      Spouse name: Not on file    Number of children: 1    Years of education: Not on file    Highest education level: Not on file   Occupational History    Occupation: retired   Tobacco Use    Smoking status: Never Smoker    Smokeless tobacco: Never Used   Substance and Sexual Activity    Alcohol use: No     Comment: been years    Drug use: No    Sexual activity: Not Currently     Family History   Problem Relation Age of Onset    Diabetes Father     No Known Problems Mother        OBJECTIVE:    Visit Vitals  /64   Pulse 63   Temp 97.7 °F (36.5 °C) (Oral)   Resp 16   Ht 5' 5\" (1.651 m)   Wt 164 lb 3.2 oz (74.5 kg)   SpO2 95%   BMI 27.32 kg/m²     CONSTITUTIONAL: well , well nourished, appears age appropriate  EYES: perrla, eom intact  ENMT:moist mucous membranes, pharynx clear  NECK: supple. Thyroid normal  RESPIRATORY: Chest: clear to ascultation and percussion   CARDIOVASCULAR: Heart: regular rate and rhythm  GASTROINTESTINAL: Abdomen: soft, bowel sounds active  HEMATOLOGIC: no pathological lymph nodes palpated  MUSCULOSKELETAL: Extremities: no edema, pulse 1+   INTEGUMENT: No unusual rashes or suspicious skin lesions noted. Nails appear normal.  NEUROLOGIC: non-focal exam   MENTAL STATUS: alert and oriented, appropriate affect      ASSESSMENT:  1. Hyperglycemia due to type 1 diabetes mellitus ([facility]    2.  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 Vascular dementia without behavioral disturbance ([facility]    4. Essential hypertension    5. Hypothyroidism due to medication    6. Dyslipidemia        PLAN:    1. The patient remains poorly controlled. This is because she has no one at home to literally guide her through the things that need be done to control her diabetes. She also has hypoglycemic unawareness. I am somewhat leery about increasing her basal insulin, primarily because her inability to eat at the same time every day. I again emphasized this to her daughter, who does not live with her. Additionally, she does need continuous glucose monitor, particularly given her glycemic variability and poor control. I will attempt to give her a Mayra device. 2. She does have progressive vascular dementia, which is getting progressively worse, manifesting itself principally as a significant decrease in her short term memory. 3. Blood pressure is excellent today, no adjustments are made. 4. She will continue her thyroid supplement as prescribed, most recently adjusted because of her increase in TSH. 5. She will also continue a statin in view of her increased cardiovascular risk created by her age and existing comorbidities. Follow-up and Dispositions    · Return in about 4 weeks (around 12/3/2019).            Justyna Claire MD

## 2019-11-19 ENCOUNTER — APPOINTMENT (OUTPATIENT)
Dept: GENERAL RADIOLOGY | Age: 79
DRG: 637 | End: 2019-11-19
Attending: EMERGENCY MEDICINE
Payer: MEDICARE

## 2019-11-19 ENCOUNTER — APPOINTMENT (OUTPATIENT)
Dept: CT IMAGING | Age: 79
DRG: 637 | End: 2019-11-19
Attending: HOSPITALIST
Payer: MEDICARE

## 2019-11-19 ENCOUNTER — HOSPITAL ENCOUNTER (INPATIENT)
Age: 79
LOS: 5 days | Discharge: HOME | DRG: 637 | End: 2019-11-24
Attending: EMERGENCY MEDICINE | Admitting: HOSPITALIST
Payer: MEDICARE

## 2019-11-19 DIAGNOSIS — E10.11 DIABETIC KETOACIDOSIS WITH COMA ASSOCIATED WITH TYPE 1 DIABETES MELLITUS (HCC): Primary | ICD-10-CM

## 2019-11-19 DIAGNOSIS — N17.9 AKI (ACUTE KIDNEY INJURY) (HCC): ICD-10-CM

## 2019-11-19 PROBLEM — G93.41 ACUTE METABOLIC ENCEPHALOPATHY: Status: ACTIVE | Noted: 2019-11-19

## 2019-11-19 LAB
ALBUMIN SERPL-MCNC: 3.7 G/DL (ref 3.5–5)
ALBUMIN/GLOB SERPL: 1.1 {RATIO} (ref 1.1–2.2)
ALP SERPL-CCNC: 168 U/L (ref 45–117)
ALT SERPL-CCNC: 41 U/L (ref 12–78)
ANION GAP SERPL CALC-SCNC: 16 MMOL/L (ref 5–15)
ANION GAP SERPL CALC-SCNC: 24 MMOL/L (ref 5–15)
ANION GAP SERPL CALC-SCNC: 24 MMOL/L (ref 5–15)
ANION GAP SERPL CALC-SCNC: 9 MMOL/L (ref 5–15)
ANION GAP SERPL CALC-SCNC: ABNORMAL MMOL/L (ref 5–15)
APPEARANCE UR: ABNORMAL
ARTERIAL PATENCY WRIST A: ABNORMAL
ARTERIAL PATENCY WRIST A: YES
ARTERIAL PATENCY WRIST A: YES
AST SERPL-CCNC: 25 U/L (ref 15–37)
ATRIAL RATE: 224 BPM
BACTERIA URNS QL MICRO: ABNORMAL /HPF
BASE DEFICIT BLD-SCNC: 13 MMOL/L
BASE DEFICIT BLDV-SCNC: 29 MMOL/L
BASOPHILS # BLD: 0 K/UL (ref 0–0.1)
BASOPHILS NFR BLD: 0 % (ref 0–1)
BDY SITE: ABNORMAL
BILIRUB SERPL-MCNC: 0.5 MG/DL (ref 0.2–1)
BILIRUB UR QL: NEGATIVE
BNP SERPL-MCNC: 591 PG/ML
BUN SERPL-MCNC: 33 MG/DL (ref 6–20)
BUN SERPL-MCNC: 37 MG/DL (ref 6–20)
BUN SERPL-MCNC: 42 MG/DL (ref 6–20)
BUN SERPL-MCNC: 43 MG/DL (ref 6–20)
BUN SERPL-MCNC: 44 MG/DL (ref 6–20)
BUN/CREAT SERPL: 20 (ref 12–20)
BUN/CREAT SERPL: 20 (ref 12–20)
BUN/CREAT SERPL: 21 (ref 12–20)
BUN/CREAT SERPL: 22 (ref 12–20)
BUN/CREAT SERPL: 22 (ref 12–20)
CALCIUM SERPL-MCNC: 7.9 MG/DL (ref 8.5–10.1)
CALCIUM SERPL-MCNC: 8 MG/DL (ref 8.5–10.1)
CALCIUM SERPL-MCNC: 9 MG/DL (ref 8.5–10.1)
CALCULATED P AXIS, ECG09: 74 DEGREES
CALCULATED R AXIS, ECG10: -57 DEGREES
CALCULATED T AXIS, ECG11: 63 DEGREES
CHLORIDE SERPL-SCNC: 108 MMOL/L (ref 97–108)
CHLORIDE SERPL-SCNC: 110 MMOL/L (ref 97–108)
CHLORIDE SERPL-SCNC: 116 MMOL/L (ref 97–108)
CHLORIDE SERPL-SCNC: 116 MMOL/L (ref 97–108)
CHLORIDE SERPL-SCNC: 93 MMOL/L (ref 97–108)
CK SERPL-CCNC: 64 U/L (ref 26–192)
CO2 SERPL-SCNC: 18 MMOL/L (ref 21–32)
CO2 SERPL-SCNC: 25 MMOL/L (ref 21–32)
CO2 SERPL-SCNC: 5 MMOL/L (ref 21–32)
CO2 SERPL-SCNC: 8 MMOL/L (ref 21–32)
CO2 SERPL-SCNC: <5 MMOL/L (ref 21–32)
COLOR UR: ABNORMAL
COMMENT, HOLDF: NORMAL
CREAT SERPL-MCNC: 1.52 MG/DL (ref 0.55–1.02)
CREAT SERPL-MCNC: 1.77 MG/DL (ref 0.55–1.02)
CREAT SERPL-MCNC: 1.99 MG/DL (ref 0.55–1.02)
CREAT SERPL-MCNC: 2.07 MG/DL (ref 0.55–1.02)
CREAT SERPL-MCNC: 2.15 MG/DL (ref 0.55–1.02)
DIAGNOSIS, 93000: NORMAL
DIFFERENTIAL METHOD BLD: ABNORMAL
EOSINOPHIL # BLD: 0 K/UL (ref 0–0.4)
EOSINOPHIL NFR BLD: 0 % (ref 0–7)
EPITH CASTS URNS QL MICRO: ABNORMAL /LPF
ERYTHROCYTE [DISTWIDTH] IN BLOOD BY AUTOMATED COUNT: 14.1 % (ref 11.5–14.5)
GAS FLOW.O2 O2 DELIVERY SYS: ABNORMAL L/MIN
GLOBULIN SER CALC-MCNC: 3.5 G/DL (ref 2–4)
GLUCOSE BLD STRIP.AUTO-MCNC: 152 MG/DL (ref 65–100)
GLUCOSE BLD STRIP.AUTO-MCNC: 154 MG/DL (ref 65–100)
GLUCOSE BLD STRIP.AUTO-MCNC: 154 MG/DL (ref 65–100)
GLUCOSE BLD STRIP.AUTO-MCNC: 200 MG/DL (ref 65–100)
GLUCOSE BLD STRIP.AUTO-MCNC: 216 MG/DL (ref 65–100)
GLUCOSE BLD STRIP.AUTO-MCNC: 290 MG/DL (ref 65–100)
GLUCOSE BLD STRIP.AUTO-MCNC: 353 MG/DL (ref 65–100)
GLUCOSE BLD STRIP.AUTO-MCNC: 392 MG/DL (ref 65–100)
GLUCOSE BLD STRIP.AUTO-MCNC: 496 MG/DL (ref 65–100)
GLUCOSE BLD STRIP.AUTO-MCNC: 513 MG/DL (ref 65–100)
GLUCOSE BLD STRIP.AUTO-MCNC: 536 MG/DL (ref 65–100)
GLUCOSE BLD STRIP.AUTO-MCNC: 586 MG/DL (ref 65–100)
GLUCOSE BLD STRIP.AUTO-MCNC: >600 MG/DL (ref 65–100)
GLUCOSE SERPL-MCNC: 141 MG/DL (ref 65–100)
GLUCOSE SERPL-MCNC: 263 MG/DL (ref 65–100)
GLUCOSE SERPL-MCNC: 509 MG/DL (ref 65–100)
GLUCOSE SERPL-MCNC: 678 MG/DL (ref 65–100)
GLUCOSE SERPL-MCNC: 953 MG/DL (ref 65–100)
GLUCOSE UR STRIP.AUTO-MCNC: >1000 MG/DL
GRAN CASTS URNS QL MICRO: ABNORMAL /LPF
HCO3 BLD-SCNC: 12.5 MMOL/L (ref 22–26)
HCO3 BLDV-SCNC: 3.4 MMOL/L (ref 23–28)
HCT VFR BLD AUTO: 41.5 % (ref 35–47)
HGB BLD-MCNC: 12.7 G/DL (ref 11.5–16)
HGB UR QL STRIP: ABNORMAL
HYALINE CASTS URNS QL MICRO: ABNORMAL /LPF (ref 0–5)
IMM GRANULOCYTES # BLD AUTO: 0 K/UL (ref 0–0.04)
IMM GRANULOCYTES NFR BLD AUTO: 0 % (ref 0–0.5)
KETONES UR QL STRIP.AUTO: 40 MG/DL
LACTATE BLD-SCNC: 7.41 MMOL/L (ref 0.4–2)
LACTATE SERPL-SCNC: 3.2 MMOL/L (ref 0.4–2)
LACTATE SERPL-SCNC: 6.2 MMOL/L (ref 0.4–2)
LEUKOCYTE ESTERASE UR QL STRIP.AUTO: NEGATIVE
LYMPHOCYTES # BLD: 1.4 K/UL (ref 0.8–3.5)
LYMPHOCYTES NFR BLD: 12 % (ref 12–49)
MAGNESIUM SERPL-MCNC: 2.6 MG/DL (ref 1.6–2.4)
MAGNESIUM SERPL-MCNC: 2.8 MG/DL (ref 1.6–2.4)
MCH RBC QN AUTO: 31.9 PG (ref 26–34)
MCHC RBC AUTO-ENTMCNC: 30.6 G/DL (ref 30–36.5)
MCV RBC AUTO: 104.3 FL (ref 80–99)
METAMYELOCYTES NFR BLD MANUAL: 1 %
MONOCYTES # BLD: 0.5 K/UL (ref 0–1)
MONOCYTES NFR BLD: 4 % (ref 5–13)
MUCOUS THREADS URNS QL MICRO: ABNORMAL /LPF
NEUTS BAND NFR BLD MANUAL: 1 %
NEUTS SEG # BLD: 9.4 K/UL (ref 1.8–8)
NEUTS SEG NFR BLD: 82 % (ref 32–75)
NITRITE UR QL STRIP.AUTO: NEGATIVE
NRBC # BLD: 0 K/UL (ref 0–0.01)
NRBC BLD-RTO: 0 PER 100 WBC
PCO2 BLD: 22.8 MMHG (ref 35–45)
PCO2 BLD: <15 MMHG (ref 35–45)
PCO2 BLDV: 16.7 MMHG (ref 41–51)
PH BLD: 7 [PH] (ref 7.35–7.45)
PH BLD: 7.35 [PH] (ref 7.35–7.45)
PH BLDV: 6.91 [PH] (ref 7.32–7.42)
PH UR STRIP: 5 [PH] (ref 5–8)
PHOSPHATE SERPL-MCNC: 8.4 MG/DL (ref 2.6–4.7)
PLATELET # BLD AUTO: 259 K/UL (ref 150–400)
PMV BLD AUTO: 10.6 FL (ref 8.9–12.9)
PO2 BLD: 141 MMHG (ref 80–100)
PO2 BLD: 86 MMHG (ref 80–100)
PO2 BLDV: 60 MMHG (ref 25–40)
POTASSIUM SERPL-SCNC: 3.3 MMOL/L (ref 3.5–5.1)
POTASSIUM SERPL-SCNC: 3.4 MMOL/L (ref 3.5–5.1)
POTASSIUM SERPL-SCNC: 3.5 MMOL/L (ref 3.5–5.1)
POTASSIUM SERPL-SCNC: 4.1 MMOL/L (ref 3.5–5.1)
POTASSIUM SERPL-SCNC: 6 MMOL/L (ref 3.5–5.1)
PROT SERPL-MCNC: 7.2 G/DL (ref 6.4–8.2)
PROT UR STRIP-MCNC: 100 MG/DL
Q-T INTERVAL, ECG07: 356 MS
QRS DURATION, ECG06: 108 MS
QTC CALCULATION (BEZET), ECG08: 485 MS
RBC # BLD AUTO: 3.98 M/UL (ref 3.8–5.2)
RBC #/AREA URNS HPF: ABNORMAL /HPF (ref 0–5)
RBC MORPH BLD: ABNORMAL
SAMPLES BEING HELD,HOLD: NORMAL
SAO2 % BLD: 96 % (ref 92–97)
SAO2 % BLDV: 71 % (ref 65–88)
SERVICE CMNT-IMP: ABNORMAL
SODIUM SERPL-SCNC: 128 MMOL/L (ref 136–145)
SODIUM SERPL-SCNC: 137 MMOL/L (ref 136–145)
SODIUM SERPL-SCNC: 142 MMOL/L (ref 136–145)
SODIUM SERPL-SCNC: 150 MMOL/L (ref 136–145)
SODIUM SERPL-SCNC: 150 MMOL/L (ref 136–145)
SP GR UR REFRACTOMETRY: 1.02 (ref 1–1.03)
SPECIMEN TYPE: ABNORMAL
TOTAL RESP. RATE, ITRR: 16
TOTAL RESP. RATE, ITRR: 22
TOTAL RESP. RATE, ITRR: 24
TROPONIN I SERPL-MCNC: <0.05 NG/ML
UA: UC IF INDICATED,UAUC: ABNORMAL
UROBILINOGEN UR QL STRIP.AUTO: 0.2 EU/DL (ref 0.2–1)
VENTRICULAR RATE, ECG03: 112 BPM
WBC # BLD AUTO: 11.3 K/UL (ref 3.6–11)
WBC URNS QL MICRO: ABNORMAL /HPF (ref 0–4)

## 2019-11-19 PROCEDURE — 87086 URINE CULTURE/COLONY COUNT: CPT

## 2019-11-19 PROCEDURE — 82803 BLOOD GASES ANY COMBINATION: CPT

## 2019-11-19 PROCEDURE — 83605 ASSAY OF LACTIC ACID: CPT

## 2019-11-19 PROCEDURE — 74011000258 HC RX REV CODE- 258: Performed by: EMERGENCY MEDICINE

## 2019-11-19 PROCEDURE — 96376 TX/PRO/DX INJ SAME DRUG ADON: CPT

## 2019-11-19 PROCEDURE — 74011250636 HC RX REV CODE- 250/636: Performed by: INTERNAL MEDICINE

## 2019-11-19 PROCEDURE — 74011250636 HC RX REV CODE- 250/636: Performed by: EMERGENCY MEDICINE

## 2019-11-19 PROCEDURE — 83880 ASSAY OF NATRIURETIC PEPTIDE: CPT

## 2019-11-19 PROCEDURE — 81001 URINALYSIS AUTO W/SCOPE: CPT

## 2019-11-19 PROCEDURE — 80048 BASIC METABOLIC PNL TOTAL CA: CPT

## 2019-11-19 PROCEDURE — 93005 ELECTROCARDIOGRAM TRACING: CPT

## 2019-11-19 PROCEDURE — 96361 HYDRATE IV INFUSION ADD-ON: CPT

## 2019-11-19 PROCEDURE — 83735 ASSAY OF MAGNESIUM: CPT

## 2019-11-19 PROCEDURE — 36415 COLL VENOUS BLD VENIPUNCTURE: CPT

## 2019-11-19 PROCEDURE — 87040 BLOOD CULTURE FOR BACTERIA: CPT

## 2019-11-19 PROCEDURE — 82550 ASSAY OF CK (CPK): CPT

## 2019-11-19 PROCEDURE — 70450 CT HEAD/BRAIN W/O DYE: CPT

## 2019-11-19 PROCEDURE — 65610000006 HC RM INTENSIVE CARE

## 2019-11-19 PROCEDURE — 74011250636 HC RX REV CODE- 250/636: Performed by: HOSPITALIST

## 2019-11-19 PROCEDURE — 96365 THER/PROPH/DIAG IV INF INIT: CPT

## 2019-11-19 PROCEDURE — 74011000250 HC RX REV CODE- 250

## 2019-11-19 PROCEDURE — 74011250637 HC RX REV CODE- 250/637: Performed by: HOSPITALIST

## 2019-11-19 PROCEDURE — 85025 COMPLETE CBC W/AUTO DIFF WBC: CPT

## 2019-11-19 PROCEDURE — 96366 THER/PROPH/DIAG IV INF ADDON: CPT

## 2019-11-19 PROCEDURE — 74011000250 HC RX REV CODE- 250: Performed by: HOSPITALIST

## 2019-11-19 PROCEDURE — 74011000250 HC RX REV CODE- 250: Performed by: INTERNAL MEDICINE

## 2019-11-19 PROCEDURE — 84100 ASSAY OF PHOSPHORUS: CPT

## 2019-11-19 PROCEDURE — 74011636637 HC RX REV CODE- 636/637

## 2019-11-19 PROCEDURE — 82962 GLUCOSE BLOOD TEST: CPT

## 2019-11-19 PROCEDURE — 77030040361 HC SLV COMPR DVT MDII -B

## 2019-11-19 PROCEDURE — 71045 X-RAY EXAM CHEST 1 VIEW: CPT

## 2019-11-19 PROCEDURE — 99285 EMERGENCY DEPT VISIT HI MDM: CPT

## 2019-11-19 PROCEDURE — 80053 COMPREHEN METABOLIC PANEL: CPT

## 2019-11-19 PROCEDURE — 74011636637 HC RX REV CODE- 636/637: Performed by: EMERGENCY MEDICINE

## 2019-11-19 PROCEDURE — 84484 ASSAY OF TROPONIN QUANT: CPT

## 2019-11-19 PROCEDURE — 74011250637 HC RX REV CODE- 250/637: Performed by: INTERNAL MEDICINE

## 2019-11-19 PROCEDURE — 74011000258 HC RX REV CODE- 258: Performed by: HOSPITALIST

## 2019-11-19 PROCEDURE — 36600 WITHDRAWAL OF ARTERIAL BLOOD: CPT

## 2019-11-19 RX ORDER — SODIUM CHLORIDE 9 MG/ML
1000 INJECTION, SOLUTION INTRAVENOUS ONCE
Status: COMPLETED | OUTPATIENT
Start: 2019-11-19 | End: 2019-11-19

## 2019-11-19 RX ORDER — SODIUM CHLORIDE 9 MG/ML
150 INJECTION, SOLUTION INTRAVENOUS CONTINUOUS
Status: DISCONTINUED | OUTPATIENT
Start: 2019-11-19 | End: 2019-11-19

## 2019-11-19 RX ORDER — BRIMONIDINE TARTRATE 2 MG/ML
1 SOLUTION/ DROPS OPHTHALMIC 2 TIMES DAILY
Status: DISCONTINUED | OUTPATIENT
Start: 2019-11-19 | End: 2019-11-24 | Stop reason: HOSPADM

## 2019-11-19 RX ORDER — SODIUM BICARBONATE 84 MG/ML
INJECTION, SOLUTION INTRAVENOUS
Status: COMPLETED
Start: 2019-11-19 | End: 2019-11-19

## 2019-11-19 RX ORDER — ACETAMINOPHEN 650 MG/1
650 SUPPOSITORY RECTAL
Status: DISCONTINUED | OUTPATIENT
Start: 2019-11-19 | End: 2019-11-24 | Stop reason: HOSPADM

## 2019-11-19 RX ORDER — FAMOTIDINE 10 MG/ML
20 INJECTION INTRAVENOUS DAILY
Status: DISCONTINUED | OUTPATIENT
Start: 2019-11-19 | End: 2019-11-20

## 2019-11-19 RX ORDER — DEXTROSE, SODIUM CHLORIDE, AND POTASSIUM CHLORIDE 5; .45; .15 G/100ML; G/100ML; G/100ML
100 INJECTION INTRAVENOUS CONTINUOUS
Status: DISCONTINUED | OUTPATIENT
Start: 2019-11-19 | End: 2019-11-20

## 2019-11-19 RX ORDER — SODIUM BICARBONATE 84 MG/ML
100 INJECTION, SOLUTION INTRAVENOUS ONCE
Status: COMPLETED | OUTPATIENT
Start: 2019-11-19 | End: 2019-11-19

## 2019-11-19 RX ORDER — SODIUM CHLORIDE 0.9 % (FLUSH) 0.9 %
5-40 SYRINGE (ML) INJECTION AS NEEDED
Status: DISCONTINUED | OUTPATIENT
Start: 2019-11-19 | End: 2019-11-24 | Stop reason: HOSPADM

## 2019-11-19 RX ORDER — METOPROLOL TARTRATE 5 MG/5ML
1.25 INJECTION INTRAVENOUS EVERY 8 HOURS
Status: DISCONTINUED | OUTPATIENT
Start: 2019-11-19 | End: 2019-11-19

## 2019-11-19 RX ORDER — ASPIRIN 300 MG/1
300 SUPPOSITORY RECTAL DAILY
Status: DISCONTINUED | OUTPATIENT
Start: 2019-11-19 | End: 2019-11-20

## 2019-11-19 RX ORDER — SODIUM CHLORIDE 0.9 % (FLUSH) 0.9 %
5-40 SYRINGE (ML) INJECTION EVERY 8 HOURS
Status: DISCONTINUED | OUTPATIENT
Start: 2019-11-19 | End: 2019-11-24 | Stop reason: HOSPADM

## 2019-11-19 RX ORDER — SODIUM CHLORIDE 0.9 % (FLUSH) 0.9 %
5-40 SYRINGE (ML) INJECTION EVERY 8 HOURS
Status: DISCONTINUED | OUTPATIENT
Start: 2019-11-19 | End: 2019-11-19 | Stop reason: SDUPTHER

## 2019-11-19 RX ORDER — DEXTROSE 50 % IN WATER (D50W) INTRAVENOUS SYRINGE
25-50 AS NEEDED
Status: DISCONTINUED | OUTPATIENT
Start: 2019-11-19 | End: 2019-11-21

## 2019-11-19 RX ORDER — POTASSIUM CHLORIDE 7.45 MG/ML
10 INJECTION INTRAVENOUS
Status: COMPLETED | OUTPATIENT
Start: 2019-11-19 | End: 2019-11-19

## 2019-11-19 RX ORDER — ONDANSETRON 2 MG/ML
4 INJECTION INTRAMUSCULAR; INTRAVENOUS
Status: DISCONTINUED | OUTPATIENT
Start: 2019-11-19 | End: 2019-11-24 | Stop reason: HOSPADM

## 2019-11-19 RX ORDER — INSULIN LISPRO 100 [IU]/ML
INJECTION, SOLUTION INTRAVENOUS; SUBCUTANEOUS
Status: DISCONTINUED | OUTPATIENT
Start: 2019-11-19 | End: 2019-11-19

## 2019-11-19 RX ORDER — SODIUM CHLORIDE 0.9 % (FLUSH) 0.9 %
5-40 SYRINGE (ML) INJECTION AS NEEDED
Status: DISCONTINUED | OUTPATIENT
Start: 2019-11-19 | End: 2019-11-19 | Stop reason: SDUPTHER

## 2019-11-19 RX ORDER — MAGNESIUM SULFATE 100 %
4 CRYSTALS MISCELLANEOUS AS NEEDED
Status: DISCONTINUED | OUTPATIENT
Start: 2019-11-19 | End: 2019-11-20

## 2019-11-19 RX ADMIN — SODIUM BICARBONATE 100 MEQ: 84 INJECTION, SOLUTION INTRAVENOUS at 14:40

## 2019-11-19 RX ADMIN — SODIUM BICARBONATE 100 MEQ: 84 INJECTION, SOLUTION INTRAVENOUS at 11:12

## 2019-11-19 RX ADMIN — DEXTROSE MONOHYDRATE, SODIUM CHLORIDE, AND POTASSIUM CHLORIDE 100 ML/HR: 50; 4.5; 1.49 INJECTION, SOLUTION INTRAVENOUS at 18:13

## 2019-11-19 RX ADMIN — BRIMONIDINE TARTRATE 1 DROP: 2 SOLUTION OPHTHALMIC at 11:53

## 2019-11-19 RX ADMIN — CEFTRIAXONE 1 G: 1 INJECTION, POWDER, FOR SOLUTION INTRAMUSCULAR; INTRAVENOUS at 11:25

## 2019-11-19 RX ADMIN — Medication 10 ML: at 14:53

## 2019-11-19 RX ADMIN — POTASSIUM CHLORIDE 10 MEQ: 7.46 INJECTION, SOLUTION INTRAVENOUS at 16:55

## 2019-11-19 RX ADMIN — FAMOTIDINE 20 MG: 10 INJECTION, SOLUTION INTRAVENOUS at 11:25

## 2019-11-19 RX ADMIN — POTASSIUM CHLORIDE 10 MEQ: 7.46 INJECTION, SOLUTION INTRAVENOUS at 18:00

## 2019-11-19 RX ADMIN — SODIUM CHLORIDE 1000 ML/HR: 900 INJECTION, SOLUTION INTRAVENOUS at 06:00

## 2019-11-19 RX ADMIN — SODIUM CHLORIDE 1000 ML: 900 INJECTION, SOLUTION INTRAVENOUS at 07:30

## 2019-11-19 RX ADMIN — POTASSIUM CHLORIDE 10 MEQ: 7.46 INJECTION, SOLUTION INTRAVENOUS at 14:50

## 2019-11-19 RX ADMIN — SODIUM BICARBONATE 100 MEQ: 84 INJECTION, SOLUTION INTRAVENOUS at 17:07

## 2019-11-19 RX ADMIN — SODIUM CHLORIDE 26.3 UNITS/HR: 900 INJECTION, SOLUTION INTRAVENOUS at 10:48

## 2019-11-19 RX ADMIN — SODIUM CHLORIDE 1000 ML: 900 INJECTION, SOLUTION INTRAVENOUS at 14:49

## 2019-11-19 RX ADMIN — Medication 1 AMPULE: at 21:45

## 2019-11-19 RX ADMIN — SODIUM CHLORIDE 150 ML/HR: 900 INJECTION, SOLUTION INTRAVENOUS at 17:03

## 2019-11-19 RX ADMIN — SODIUM CHLORIDE 10.8 UNITS/HR: 900 INJECTION, SOLUTION INTRAVENOUS at 07:20

## 2019-11-19 RX ADMIN — Medication 10 ML: at 09:48

## 2019-11-19 RX ADMIN — SODIUM CHLORIDE 26.6 UNITS/HR: 900 INJECTION, SOLUTION INTRAVENOUS at 14:58

## 2019-11-19 RX ADMIN — Medication 10 ML: at 11:58

## 2019-11-19 RX ADMIN — HUMAN INSULIN 10 UNITS: 100 INJECTION, SOLUTION SUBCUTANEOUS at 05:52

## 2019-11-19 RX ADMIN — POTASSIUM CHLORIDE 10 MEQ: 7.46 INJECTION, SOLUTION INTRAVENOUS at 15:40

## 2019-11-19 RX ADMIN — SODIUM CHLORIDE 150 ML/HR: 900 INJECTION, SOLUTION INTRAVENOUS at 09:46

## 2019-11-19 RX ADMIN — SODIUM CHLORIDE 23 UNITS/HR: 900 INJECTION, SOLUTION INTRAVENOUS at 17:07

## 2019-11-19 RX ADMIN — ASPIRIN 300 MG: 300 SUPPOSITORY RECTAL at 11:25

## 2019-11-19 RX ADMIN — SODIUM CHLORIDE 1000 ML: 900 INJECTION, SOLUTION INTRAVENOUS at 06:01

## 2019-11-19 RX ADMIN — SODIUM CHLORIDE 9.4 UNITS/HR: 900 INJECTION, SOLUTION INTRAVENOUS at 21:45

## 2019-11-19 RX ADMIN — WATER: 1000 INJECTION, SOLUTION INTRAVENOUS at 17:03

## 2019-11-19 RX ADMIN — SODIUM CHLORIDE 28.6 UNITS/HR: 900 INJECTION, SOLUTION INTRAVENOUS at 11:52

## 2019-11-19 RX ADMIN — BRIMONIDINE TARTRATE 1 DROP: 2 SOLUTION OPHTHALMIC at 17:06

## 2019-11-19 RX ADMIN — HUMAN INSULIN 10 UNITS: 100 INJECTION, SOLUTION SUBCUTANEOUS at 07:03

## 2019-11-19 RX ADMIN — Medication 10 ML: at 21:46

## 2019-11-19 NOTE — PROGRESS NOTES
0730 - Bedside and Verbal shift change report given to funmi gilliam (oncoming nurse) by Nikolas Salamanca (offgoing nurse). Report included the following information SBAR, Kardex, ED Summary, Intake/Output, MAR, Recent Results and Cardiac Rhythm ST.   1011 - bedside report given to ICU RN. Patient to CT and then ICU.

## 2019-11-19 NOTE — PROGRESS NOTES
Reason for Admission:   Altered mental status, elevated BS                   RRAT Score:          9 low risk           Plan for utilizing home health:      Has used home health in the past                    Current Advanced Directive/Advance Care Plan:   none                         Transition of Care Plan:                      1.  Patient in ED bed waiting for inpatient admission  2. Patient will need 2nd IM letter at discharge  3. Patient's daughter prefers for patient to discharge home with home health assistance if needed    Patient is a 69year old female admitted 11/19 for AMS and elevated BS. Patient resting in bed, son Jaquan Song present in room. Jaquan Song has a mental disability and patient admit for AMS. Patient gave permission to CM to call her daughter/POA Estephania Mario [phone] for assessment information. Demographic information verified and correct. Insurance information verified and correct. Patient lives with her son Jaquan Song and another son, no home oxygen, no DME, has used home health in the past.  Patient uses [facility] on [address] Patient's daughter states patient is independent with ADLs with some assistance from Jaquan Song. Patient does not drive, her daughter or son can transport at discharge. Son Jaquan Song present in room with patient. Andrew Chin states she will send a family member to pick him up. Physician and nurse informed. Care Management Interventions  PCP Verified by CM: Jillian Grider MD)  Mode of Transport at Discharge:  Other (see comment)(pts' son or daughter provide transportation)  Transition of Care Consult (CM Consult): Discharge Planning  Discharge Durable Medical Equipment: No  Physical Therapy Consult: No  Occupational Therapy Consult: No  Speech Therapy Consult: No  Current Support Network: Own Home(lives with 2 sons, tri level house, 1 step to enter)  Confirm Follow Up Transport: Family  Plan discussed with Pt/Family/Caregiver: Yes  Discharge Location  Discharge Placement: [address], RN, [facility]  [phone]

## 2019-11-19 NOTE — ROUTINE PROCESS
TRANSFER - OUT REPORT: 
 
Verbal report given to claude(name) on [address]  being transferred to ICU(unit) for routine progression of care Report consisted of patients Situation, Background, Assessment and  
Recommendations(SBAR). Information from the following report(s) SBAR, Kardex, ED Summary, Intake/Output, MAR, Recent Results and Cardiac Rhythm ST was reviewed with the receiving nurse. Lines:  
Peripheral IV 11/19/19 Right Forearm (Active) Site Assessment Clean, dry, & intact 11/19/2019  5:44 AM  
Phlebitis Assessment 0 11/19/2019  5:44 AM  
Infiltration Assessment 0 11/19/2019  5:44 AM  
Dressing Status Clean, dry, & intact 11/19/2019  5:44 AM  
Dressing Type Transparent;Tape 11/19/2019  5:44 AM  
Hub Color/Line Status Pink;Patent; Flushed 11/19/2019  5:44 AM  
Action Taken Catheter retaped;Blood drawn 11/19/2019  5:44 AM  
   
Peripheral IV 11/19/19 Left Hand (Active) Site Assessment Clean, dry, & intact 11/19/2019  6:15 AM  
Phlebitis Assessment 0 11/19/2019  6:15 AM  
Infiltration Assessment 0 11/19/2019  6:15 AM  
Dressing Status Clean, dry, & intact 11/19/2019  6:15 AM  
Dressing Type Transparent;Tape 11/19/2019  6:15 AM  
Hub Color/Line Status Pink;Flushed;Patent 11/19/2019  6:15 AM  
Action Taken Catheter retaped;Blood drawn 11/19/2019  6:15 AM  
  
 
Opportunity for questions and clarification was provided. Patient transported with: 
 Monitor Registered Nurse Tech

## 2019-11-19 NOTE — DIABETES MGMT
[facility]    [facility] Progress Note    Pt admitted today with DKA and currently on insulin gtt. Note patient critically ill at this time. Recommend continuing insulin gtt until anion gap less than 12 x2 consecutive blood draws then resume home regimen. Insulin gtt should be continued for 2hrs after administration of lantus dose. Discontinue D5 IVF when insulin gtt discontinued. Chart reviewed on [facility]. A1c:   Lab Results   Component Value Date/Time    Hemoglobin A1c 13.6 (H) 10/15/2019 05:10 PM    Hemoglobin A1c 13.6 (H) 05/28/2019 05:25 PM       Recent Glucose Results:   Lab Results   Component Value Date/Time     (H) 11/19/2019 01:35 PM     (HH) 11/19/2019 10:02 AM     (HH) 11/19/2019 05:43 AM    GLUCPOC 392 (H) 11/19/2019 02:55 PM    GLUCPOC 513 (H) 11/19/2019 01:57 PM    GLUCPOC 496 (H) 11/19/2019 12:51 PM        Lab Results   Component Value Date/Time    Creatinine 1.99 (H) 11/19/2019 01:35 PM     Estimated Creatinine Clearance: 23.4 mL/min (A) (based on SCr of 1.99 mg/dL (H)). Active Orders   Diet    DIET NPO        PO intake: No data found. Current hospital DM medication: insulin gtt    Will continue to follow as needed. Thank you.   Jose L Waddell, [address], [address]  Office:  [phone]

## 2019-11-19 NOTE — CONSULTS
PULMONARY ASSOCIATES [facility] INTENSIVIST Consult Service Note  Pulmonary, Critical Care, and Sleep Medicine    Name: Laury Armendariz MRN: [id number]   : 1940 Hospital: [facility]   Date: 2019  Admission date: 2019 Hospital Day: 1       Subjective/Interval History:   Seen earlier today on rounds. Pt is unstable and acutely ill in the CCU. Patient was re-evaluated multiple times with repeated discussions with CCU team throughout the day      IMPRESSION:   1. Kussmaul respirations  2. Severe DKA  3. H/o poorly controlled DM - DKA or hypoglycemia   4. Vascular dementia- reports she forgot her insulin past couple of days   5. Acute metabolic encephalopathy, POA  Lethargic but arousable and answer questions, disoriented to time  6. Possible uti, POA with bacteriuria, AMS, tachycardia, mild leukocytosis  7. ONEYDA Cr 2.1, baseline 0.6  8. Severe metabolic acidosis due to DKA  9. Hypothyroid  10. H/o CVA   6. 2019 [facility] Elevated troponin level with negative ischemic testing. 12. Primary hypertension. 13. Compensated hypothyroidism. 14. Body mass index is 24.96 kg/m². 15. Additional workup outlined below  16. Multiorgan dysfunction as outlined above: Pt has one or more acute or chronic illnesses with severe exacerbation with progression or side effects of treatment that poses a threat to life or bodily function  17. Pt is unstable, unpredictable needing more CCU monitoring; at high risk of sudden decline and decompensation with life threatening consequenses and continued end organ dysfunction and failure  18. Pt is critically ill. Time spent with pt and staff actively rendering care, managing pt and coordinating care as stated below;  35 minutes, exclusive of any procedures      RECOMMENDATIONS/PLAN:   1. CCU monitoring  2. Family may need to give pt insulin at home as she cannot be relied upon to do so  3. Fluid resuscitation  4.  CT head- no acute changes  5. empiric rocephin  6. F/u urine and blood cultures  7. insulin drip per protocol  8. BMP, mag q4h  9. hold PO metoprolol   10. change aspirin to suppository. Hold plavix while npo  11. Reorient  12. IV bicarb now, may need more later  13. Body mass index is 24.96 kg/m². 14. Glucose monitoring and SSI  15. Replete electrolytes  16. Labs to follow electrolytes, renal function and and blood counts  17. Bronchial hygiene with respiratory therapy techniques, bronchodilators  18. Pt needs IV fluids with additives and Drug therapy requiring intensive monitoring for toxicity  19. Prescription drug management with home med reconciliation reviewed  20. DVT, SUP prophylaxis  21. Will be available to assist in medical management while in the CCU pending disposition         Subjective/Initial History:   I have reviewed the flowsheet and previous days notes. Seen earlier today on rounds. I was asked by Amy Radford MD to see Venus Alba a 69 y.o.  female  in consultation for a chief complaint of DKA    The patient is unable to give any meaningful history or review of systems due to patient factors. Excerpts from admission 11/19/2019 and consult notes reviewed as follows:     \"Deana Sanchez is a 69 y.o. female with difficult to control DM, often admission for hypoglycemia or dka. A1c 13. Patient lethargic, oriented to self and place. Denies any HA, abdominal pain, CP, SOB, cough \"    Notes from June 2019 from Dr. Ralph Zheng reviewed: \"The patient is a 71-year-old lady presented to the emergency room because of profound fatigue and lethargy. She was found to have diabetic ketoacidosis and was indeed admitted. Her initial CO2 on her electrolytes was less than 5. She has a history of type 2B diabetes and she probably had type 1. Her compliance has been extremely poor. Her last hemoglobin A1c was 13.   Efforts to correct this had been feudal.  She is maintained on a basal insulin specifically [place] as well as prandial insulin. Any increase in the basal insulin leads to significant interventional hypoglycemia. Unfortunately, she does not check blood sugars on a consistent basis and does not eat in a timely fashion as well as consuming inadequate amount of calories per feeding. I have tried to get her on CGM, but this has not been successful, but I think this will indeed make a difference now. From a diabetic prospective, however, the patient will have a continuous glucose monitor placed in the form of [address]. She does indeed qualify for this given the fact that she is technically a type 1/2B diabetic and she takes insulin four times a day and checks blood sugars four times a day. \"     She has been seeing Dr. Yessica Hawkins in his office but BS control has still been challenging. Pt now in CCU. Admits she forgets to take insulin. No chest pain, HA, abdominal pain or fever. Does nto recall line of work. Has four children. Lives with son. Has three brothers. Never smoked. No nausea. Patient PCP: Rimma Sharma MD  PMH:  has a past medical history of Diabetes ([facility] [address] Heart failure ([facility] [address] Hypercholesteremia, Hypertension, Stroke [facility]), and Thyroid disease. PSH:   has a past surgical history that includes hx gyn; hx hysterectomy; pr thyroidectomy; pr removal gallbladder; and hx heent. FHX: family history includes Diabetes in her father; No Known Problems in her mother. SHX:  reports that she has never smoked. She has never used smokeless tobacco. She reports that she does not drink alcohol or use drugs. ROS:Review of systems not obtained due to patient factors.     No Known Allergies   MEDS:   Current Facility-Administered Medications   Medication    insulin regular (NOVOLIN R, HUMULIN R) 100 Units in 0.9% sodium chloride 100 mL infusion    glucose chewable tablet 16 g    dextrose (D50W) injection syrg 12.5-25 g    glucagon (GLUCAGEN) injection 1 mg    0.9% sodium chloride infusion    sodium chloride (NS) flush 5-40 mL    sodium chloride (NS) flush 5-40 mL    acetaminophen (TYLENOL) suppository 650 mg    ondansetron (ZOFRAN) injection 4 mg    cefTRIAXone (ROCEPHIN) 1 g in 0.9% sodium chloride (MBP/ADV) 50 mL    metoprolol (LOPRESSOR) injection 1.25 mg    [START ON 2019] levothyroxine (SYNTHROID) injection 130 mcg    brimonidine (ALPHAGAN) 0.2 % ophthalmic solution 1 Drop    aspirin (ASA) suppository 300 mg    famotidine (PF) (PEPCID) injection 20 mg          Objective:     Vital Signs: Telemetry:    normal sinus rhythm Intake/Output:   Visit Vitals  BP (!) 115/30   Pulse 100   Temp 97 °F (36.1 °C)   Resp 27   Ht 5' 5\" (1.651 m)   Wt 68 kg (150 lb)   SpO2 100%   BMI 24.96 kg/m²       Temp (24hrs), Av °F (36.7 °C), Min:97 °F (36.1 °C), Max:99 °F (37.2 °C)        O2 Device: Room air           Body mass index is 24.96 kg/m². Wt Readings from Last 4 Encounters:   19 68 kg (150 lb)   19 74.5 kg (164 lb 3.2 oz)   10/15/19 73.7 kg (162 lb 8 oz)   19 74.5 kg (164 lb 4.8 oz)          Intake/Output Summary (Last 24 hours) at 2019 1133  Last data filed at 2019 1000  Gross per 24 hour   Intake 2000 ml   Output 140 ml   Net 1860 ml       Last shift:       0701 -  1900  In: 2000 [I.V.:2000]  Out: 140 [Urine:140]  Last 3 shifts: No intake/output data recorded. Physical Exam:      General:  Alert, cooperative, no distress    Head:  Normocephalic, without obvious abnormality, atraumatic. Eyes:  Conjunctivae/corneas clear. Nose: Nares normal. Septum midline. Mucosa normal.    Throat: Lips, mucosa, and tongue normal. Teeth and gums normal.   Neck: Supple, symmetrical, trachea midline   Back:   Symmetric, no curvature. ROM normal.   Lungs:   Clear to auscultation bilaterally. Chest wall:  No tenderness or deformity. Heart:  Regular rate and rhythm    Abdomen:   Soft, non-tender.  Bowel sounds normal.     Extremities: Extremities normal, atraumatic, no cyanosis or edema. Skin: Skin color, texture, turgor normal. No rashes or lesions   Neurologic: Grossly nonfocal           Labs:    Recent Labs     11/19/19  0543   WBC 11.3*   HGB 12.7        Recent Labs     11/19/19  1002 11/19/19  0543    128*   K 4.1 6.0*    93*   CO2 5* <5*   * 953*   BUN 42* 43*   CREA 2.07* 2.15*   CA 7.9* 9.0   MG 2.6* 2.8*   PHOS  --  8.4*   ALB  --  3.7   SGOT  --  25   ALT  --  41     No results for input(s): PH, PCO2, PO2, HCO3, FIO2 in the last 72 hours. Recent Labs     11/19/19  0543   CPK 64   TROIQ <0.05     Lab Results   Component Value Date/Time     (H) 11/11/2012 03:30 AM      Lab Results   Component Value Date/Time    Culture result: NO GROWTH AFTER 1 HOUR 11/19/2019 05:43 AM    Culture result: NO GROWTH 5 DAYS 10/03/2018 01:16 PM    Culture result: NO GROWTH 5 DAYS 06/02/2017 02:26 PM      Lab Results   Component Value Date/Time    CK 64 11/19/2019 05:43 AM     06/10/2019 06:04 PM       Imaging:  I have personally reviewed the patients radiographs and have reviewed the reports:    CXR Results  (Last 48 hours)               11/19/19 0642  XR CHEST PORT Final result    Impression:  IMPRESSION: No evidence of acute cardiopulmonary process. Low lung volumes. Narrative:  INDICATION: Chest pain       COMPARISON: Caren 10, 2019       FINDINGS: AP portable imaging of the chest performed at 6:20 AM demonstrates a   stable cardiomediastinal silhouette. Lung volumes remain low. There is no new   airspace disease or pleural effusion. Degenerative changes are present in the   thoracic spine. Results from [place] encounter on 11/19/19   XR CHEST PORT    Narrative INDICATION: Chest pain    COMPARISON: Ca[place] 10, 2019    FINDINGS: AP portable imaging of the chest performed at 6:20 AM demonstrates a  stable cardiomediastinal silhouette. Lung volumes remain low. There is no new  airspace disease or pleural effusion. Degenerative changes are present in the  thoracic spine. Impression IMPRESSION: No evidence of acute cardiopulmonary process. Low lung volumes. Results from Hospital Encounter encounter on 06/10/19   XR CHEST PORT    Narrative Indication: Altered mental status    Comparison: 10/19/2018    Portable exam of the chest obtained at 1203 demonstrates heart size at the upper  limits of normal. There is no acute process in the lung fields. The osseous  structures are unremarkable. Impression Impression: No acute process. Results from [place] encounter on 10/19/18   XR CHEST PORT    Narrative INDICATION: Shortness of breath. Portable AP semiupright view of the chest.    Direct comparison made to prior chest x-ray dated October 4, 2018. Cardiomediastinal silhouette is stable. There is mild bibasilar atelectasis. No  pleural fluid is visualized. There is no pneumothorax. Osseous structures are  intact. Impression IMPRESSION: Mild bibasilar atelectasis. Results from [place] encounter on 11/19/19   CT HEAD WO CONT    Narrative EXAM: CT HEAD WO CONT    INDICATION: lethargic, hyperglycemia    COMPARISON: 6/10/2019 CT. CONTRAST: None. TECHNIQUE: Unenhanced CT of the head was performed using 5 mm images. Brain and  bone windows were generated. CT dose reduction was achieved through use of a  standardized protocol tailored for this examination and automatic exposure  control for dose modulation. FINDINGS:  Mild cortical sulcal and moderate diffuse ventricular enlargement is unchanged. Mild bilateral periventricular diminished attenuation of the cerebrum is also  unchanged. There is no intracranial hemorrhage, extra-axial collection, mass,  mass effect or midline shift. The basilar cisterns are open. No acute infarct  is identified. The bone windows demonstrate no abnormalities. The visualized  portions of the paranasal sinuses and mastoid air cells are clear. Impression IMPRESSION: Unchanged CT imaging appearance of the head. During this entire length of time the patient's condition was unstable, unpredictable and critically ill in the CCU/ ICU. I was immediately available to the patient whose care required several interactions with nursing, multidisciplinary team members leading to multiple interventions with fluid resuscitation and medication adjustments to optimize respiratory support, hemodynamic treatment, medication changes based on repeat labs results, reviews, exams and assessments. The reason for providing this level of medical care was due to a critical illness that impaired one or more vital organ systems, such that there was a high probability of sudden or life threatening deterioration in the patient's condition. This care involved high complexity medical decision making to treat acute and unstable vital organ system failure, and to prevent further life threatening deterioration of the patients condition. I personally:  · Reviewed the flowsheet and previous days notes  · Reviewed and summarized records or history from previous days note or discussions with staff, family  · Parenteral controlled substances - Reviewed/ Adjusted / Yves Jodi / Started  · High Risk Drug therapy requiring intensive monitoring for toxicity: eg steroids, pressors, antibiotics  · Reviewed and/or ordered Clinical lab tests  · Reviewed and/or ordered Radiology tests  · Reviewed and/or ordered of Medicine tests  · Independently visualized radiologic Images  · Reviewed the patients ECG / Telemetry  ·  discussed my assessment/management with : Consultants, Nursing, Pharmacy, Case Management, PT, OT, Respiratory Therapy, Hospitalist for coordination of care           Thank you for allowing us to participate in the care of this patient. We will follow along with you until they no longer require CCU services.     Roni Parker MD

## 2019-11-19 NOTE — ED PROVIDER NOTES
EMERGENCY DEPARTMENT HISTORY AND PHYSICAL EXAM      Date: 11/19/2019  Patient Name: Kimberly Mcbride    History of Presenting Illness     Chief Complaint   Patient presents with    Altered mental status     ANO x3; denies CP, is SOB and tachypnic     High Blood Sugar     >600 via EMS        History Provided By: Patient, Patient's Son and EMS    HPI: Kimberly Mcbride, 69 y.o. female with PMHx significant for insulin dependent diabetes presents to the emergency room with chief complaint of altered mental status. Son is a poor historian and just keeps repeating \"her sugars too high\" and a loud voice. Patient is able to tell us that she ran out of her insulin yesterday, but is somewhat disoriented and unable to give a full history. Apparently the son called EMS because patient had altered mental status. EMS found her sugar to be greater than 600        PCP: Mandeep Cortez MD    No current facility-administered medications on file prior to encounter. Current Outpatient Medications on File Prior to Encounter   Medication Sig Dispense Refill    flash glucose scanning reader (FREESTYLE KEVON 14 DAY READER) misc As directed 1 Each 3    flash glucose sensor (FREESTYLE KEVON 14 DAY SENSOR) kit As directed1 2 Kit 11    amLODIPine (NORVASC) 10 mg tablet TAKE 1 TABLET BY MOUTH EVERY DAY IN THE MORNING 90 Tab 3    pravastatin (PRAVACHOL) 80 mg tablet TAKE 1 TABLET BY MOUTH EVERY DAY 90 Tab 3    losartan-hydroCHLOROthiazide (HYZAAR) 100-25 mg per tablet TAKE 1 TABLET BY MOUTH EVERY DAY 90 Tab 3    levothyroxine (SYNTHROID) 175 mcg tablet TAKE 1 TABLET BY MOUTH EVERY DAY 30 Tab 11    flash glucose sensor (FREESTYLE KEVON 10 DAY SENSOR) kit Does not apply to route 1 Kit 11    flash glucose scanning reader (FREESTYLE KEVON 10 DAY READER) misc Does not apply to route 3 Each 11    fenofibrate nanocrystallized (TRICOR) 145 mg tablet Take 145 mg by mouth daily.       Blood-Glucose Meter (ACCU-CHEK LORELEI PLUS METER) misc Use to test blood sugar three times a day. Dx.e11.9 1 Each 0    glucose blood VI test strips (ACCU-CHEK LORELEI PLUS TEST STRP) strip Use to test blood sugar three times a day. Dx.e11.9 100 Strip 11    lancets (ACCU-CHEK SOFTCLIX LANCETS) misc Use to test blood sugar three times a day. Dx.e11.9 100 Each 11    clopidogrel (PLAVIX) 75 mg tab TAKE 1 TAB BY MOUTH DAILY. 11    BD ULTRA-FINE SHORT PEN NEEDLE 31 gauge x 5/16\" ndle       insulin degludec (TRESIBA FLEXTOUCH U-100) 100 unit/mL (3 mL) inpn 20 Units by SubCUTAneous route daily. 14 units every morning (Patient taking differently: 14 Units by SubCUTAneous route daily. 14 units every morning) 1 Adjustable Dose Pre-filled Pen Syringe 11    aspirin delayed-release 81 mg tablet Take 1 Tab by mouth daily. 30 Tab 11    metoprolol succinate (TOPROL-XL) 25 mg XL tablet Take 1 Tab by mouth daily. 30 Tab 11    insulin aspart U-100 (NOVOLOG) 100 unit/mL (3 mL) inpn 4 units AC breakfast,lunch, and dinner (Patient taking differently: by SubCUTAneous route. 4 units AC breakfast,lunch, and dinner) 1 Adjustable Dose Pre-filled Pen Syringe 11    brimonidine (ALPHAGAN) 0.15 % ophthalmic solution Administer 1 Drop to both eyes two (2) times a day.          Past History     Past Medical History:  Past Medical History:   Diagnosis Date    Diabetes ([address]     Heart failure ([address]     unknown to family    Hypercholesteremia     Hypertension     Stroke ([facility] [address]     Thyroid disease        Past Surgical History:  Past Surgical History:   Procedure Laterality Date    HX GYN      HX HEENT      thyroidectomy    HX HYSTERECTOMY      REMOVAL GALLBLADDER      THYROIDECTOMY         Family History:  Family History   Problem Relation Age of Onset    Diabetes Father     No Known Problems Mother        Social History:  Social History     Tobacco Use    Smoking status: Never Smoker    Smokeless tobacco: Never Used   Substance Use Topics    Alcohol use: No     Comment: been years   [place] Drug use: No       Allergies:  No Known Allergies      Review of Systems   Review of Systems   Unable to perform ROS: Acuity of condition         Physical Exam   General appearance - well nourished, well appearing, and in no distress  Eyes - pupils equal and reactive, extraocular eye movements intact  ENT - mucous membranes moist, pharynx normal without lesions  Neck - supple, no significant adenopathy; non-tender to palpation  Chest -Kussmaul respirations, clear to auscultation, no wheezes, rales or rhonchi; non-tender to palpation  Heart -tachycardic, regular rhythm, S1 and S2 normal, no murmurs noted  Abdomen - soft, nontender, nondistended, no masses or organomegaly  Musculoskeletal - no joint tenderness, deformity or swelling; normal ROM  Extremities - peripheral pulses normal, no pedal edema  Skin - normal coloration and turgor, no rashes  Neurological -lethargic opens eyes to voice and answers some questions appropriately, but is confused about time and place. Normal speech, no focal findings or movement disorder noted          Medical Decision Making   I am the first provider for this patient. I reviewed the vital signs, available nursing notes, past medical history, past surgical history, family history and social history. Vital Signs-Reviewed the patient's vital signs. Patient Vitals for the past 12 hrs:   Temp Pulse Resp BP SpO2   11/19/19 0536 99 °F (37.2 °C) (!) 116 (!) 32 150/43 100 %       EKG: Sinus tachycardia, 112 bpm, normal axis, normal [place], QRS, QTc intervals, nonspecific ST changes    Records Reviewed: Nursing Notes and Old Medical Records    Provider Notes (Medical Decision Making):   Differential diagnosis: Dehydration, electrolyte abnormality, DKA, pneumonia, UTI    ED Course:   Initial assessment performed. The patients presenting problems have been discussed, and they are in agreement with the care plan formulated and outlined with them.   I have encouraged them to ask questions as they arise throughout their visit. Progress Notes:  ED Course as of Nov 19 0836 Tue Nov 19, 2019   0714 Case discussed with Dr. Jean Dumont (hospitalist who will see and admit the patient. Started on glucose stabilizer with insulin drip. [AO]      ED Course User Index  [AO] Leeanna Paul MD       Disposition:  Admit to hospitalist    CRITICAL CARE NOTE :        IMPENDING DETERIORATION -Respiratory, Cardiovascular, CNS and Metabolic    ASSOCIATED RISK FACTORS - Dysrhythmia, Metabolic changes, Dehydration and CNS Decompensation    MANAGEMENT- Bedside Assessment and Supervision of Care    INTERPRETATION -  Blood Gases, ECG and Blood Pressure    INTERVENTIONS - hemodynamic mngmt, vascular control and Metobolic interventions    CASE REVIEW - Hospitalist, Nursing and Family    TREATMENT RESPONSE -Stable    PERFORMED BY - Self        NOTES   :      I have spent 45 minutes of critical care time involved in lab review, consultations with specialist, family decision- making, bedside attention and documentation. During this entire length of time I was immediately available to the patient . Germaine Aguilar MD          Diagnosis     Clinical Impression:   1. Diabetic ketoacidosis with coma associated with type 1 diabetes mellitus ([facility] [address]    2.  ONEYDA (acute kidney injury) ([facility] [address]

## 2019-11-19 NOTE — H&P
Hospitalist Admission Note    NAME: Karlie Duncan   :  1940   MRN:  [id number]     Date/Time:  2019 9:23 AM    Patient PCP: Laura Avilez MD  ______________________________________________________________________   Assessment & Plan:  Severe DKA with coma in difficult to control patient, often have DKA or, less seldom, severe hypoglycemia requiring D75  Acute metabolic encephalopathy, POA  Lethargic but arousable and answer questions, disoriented to time  Possible uti, POA with bacteriuria, AMS, tachycardia, mild leukocytosis  ONEYDA Cr 2.1, baseline 0.6  Severe metabolic acidosis due to DKA  Hypothyroid  Hx CVA    --admit to icu, CT head  --empiric rocephin x 3 days. F/u urine and blood cultures  --insulin drip but BS still >600. Await ABG, may need bicarb  --BMP, mag q4h  --IVF  --change PO metoprolol to IV  --change aspirin to suppository. Hold plavix while npo  --npo until alert and dka resolved    Body mass index is 24.96 kg/m². Code:  full  DVT prophylaxis:  heparin  Surrogate decision maker:  Son Jose Schultz or daughter Simi Gandhi        Subjective:   CHIEF COMPLAINT:  High BS    HISTORY OF PRESENT ILLNESS:     Karlie Duncan is a 69 y.o. female with difficult to control DM, often admission for DKA, latest A1c 13. Sometimes admitted for severe hypoglycemia requiring D10. Patient lethargic, oriented to self and place. Denies any HA, abdominal pain, CP, SOB, cough. Unable to provide history. Mentally delayed son, Chanel De Jesus, who lives with her report high blood sugar. Recently put on continue glucose monitor with [employer] system by pcp. We were asked to admit for work up and evaluation of the above problems.      Past Medical History:   Diagnosis Date    Diabetes ([facility]     Heart failure ([facility]     unknown to family    Hypercholesteremia     Hypertension     Stroke ([facility]     Thyroid disease       Past Surgical History:   Procedure Laterality Date    HX GYN      HX HEENT thyroidectomy    HX HYSTERECTOMY      REMOVAL GALLBLADDER      THYROIDECTOMY       Social History     Tobacco Use    Smoking status: Never Smoker    Smokeless tobacco: Never Used   Substance Use Topics    Alcohol use: No     Comment: been years     Family History   Problem Relation Age of Onset    Diabetes Father     No Known Problems Mother      No Known Allergies     Prior to Admission medications    Medication Sig Start Date End Date Taking? Authorizing Provider   amLODIPine (NORVASC) 10 mg tablet TAKE 1 TABLET BY MOUTH EVERY DAY IN THE MORNING 11/3/19   Gina Ware MD   pravastatin (PRAVACHOL) 80 mg tablet TAKE 1 TABLET BY MOUTH EVERY DAY 11/3/19   Gina Ware MD   losartan-hydroCHLOROthiazide [facility]) 100-25 mg per tablet TAKE 1 TABLET BY MOUTH EVERY DAY 11/1/19   Gina Ware MD   levothyroxine (SYNTHROID) 175 mcg tablet TAKE 1 TABLET BY MOUTH EVERY DAY 10/23/19   Gina Ware MD   fenofibrate nanocrystallized (TRICOR) 145 mg tablet Take 145 mg by mouth daily. 3/26/19   Provider, Historical   clopidogrel (PLAVIX) 75 mg tab TAKE 1 TAB BY MOUTH DAILY. 4/10/19   Provider, Historical   insulin degludec (TRESIBA FLEXTOUCH U-100) 100 unit/mL (3 mL) inpn 20 Units by SubCUTAneous route daily. 14 units every morning  Patient taking differently: 14 Units by SubCUTAneous route daily. 14 units every morning 6/15/19   Gina Ware MD   aspirin delayed-release 81 mg tablet Take 1 Tab by mouth daily. 6/15/19   Gina Ware MD   metoprolol succinate (TOPROL-XL) 25 mg XL tablet Take 1 Tab by mouth daily. 6/15/19   Gina Ware MD   insulin aspart U-100 (NOVOLOG) 100 unit/mL (3 mL) inpn 4 units AC breakfast,lunch, and dinner  Patient taking differently: by SubCUTAneous route. 4 units AC breakfast,lunch, and dinner 6/15/19   Gina Ware MD   brimonidine (ALPHAGAN) 0.15 % ophthalmic solution Administer 1 Drop to both eyes two (2) times a day.  1/30/15   Provider, Historical     REVIEW OF SYSTEMS:  POSITIVE= Bold. Negative = normal text  Unable to obtain due to mental status      Objective:   VITALS:    Visit Vitals  BP (!) 118/33 (BP 1 Location: Left arm, BP Patient Position: At rest)   Pulse (!) 107   Temp 97 °F (36.1 °C)   Resp 28   Ht 5' 5\" (1.651 m)   Wt 68 kg (150 lb)   SpO2 100%   BMI 24.96 kg/m²     Temp (24hrs), Av °F (36.7 °C), Min:97 °F (36.1 °C), Max:99 °F (37.2 °C)    Body mass index is 24.96 kg/m². PHYSICAL EXAM:    General:    Lethargic, ill appearing, laying flat on bed, tachypnic, no distress, appears stated age. Son Tree Spear at bedside, mentally delayed  HEENT: Atraumatic, anicteric sclerae, pink conjunctivae. Kussmaul respiration     No oral ulcers, mucosa dry, throat clear. Hearing intact. Neck:  Supple, symmetrical,  thyroid: non tender  Lungs:   Coarse rhonchi b/l. No Wheezing  Chest wall:  No tenderness  No Accessory muscle use. Heart:   Regular  rhythm,  Tachycardic, No  murmur   No gallop. No edema. Abdomen:   Soft, non-tender. Not distended. Bowel sounds normal. SQ hypoumbilical linear mass midline  Extremities: No cyanosis. No clubbing  Skin:     Not pale Not Jaundiced  No rashes   Psych:  Poor insight. Not depressed. Not anxious or agitated. Neurologic: Pupils equal, EOMs intact. No facial asymmetry. No aphasia or slurred speech. Moving all extremities to request but movement in left better than right.  oriented X self and place only      IMAGING RESULTS:   []       I have personally reviewed the actual   []     CXR  []     CT scan  CXR:  CT :  EKG:   ________________________________________________________________________  Care Plan discussed with:    Comments   Patient     Family      RN y    Care Manager                    Consultant:      ________________________________________________________________________  Prophylaxis:  GI pepcid   DVT heparin   ________________________________________________________________________  Recommended Disposition: Home with Family y   HH/PT/OT/RN y   SNF/LTC    TAYA    ________________________________________________________________________  Code Status:  Full Code y   DNR/DNI    ________________________________________________________________________  TOTAL TIME: 40 minutes critical care      Comments    y Reviewed previous records       ______________________________________________________________________  Marcheta MD Guzman      Procedures: see electronic medical records for all procedures/Xrays and details which were not copied into this note but were reviewed prior to creation of Plan. LAB DATA REVIEWED:    Recent Results (from the past 24 hour(s))   GLUCOSE, POC    Collection Time: 11/19/19  5:28 AM   Result Value Ref Range    Glucose (POC) >600 (HH) 65 - 100 mg/dL    Performed by Ferny Pickett    GLUCOSE, POC    Collection Time: 11/19/19  5:29 AM   Result Value Ref Range    Glucose (POC) >600 (HH) 65 - 100 mg/dL    Performed by Ferny Pickett    CBC WITH AUTOMATED DIFF    Collection Time: 11/19/19  5:43 AM   Result Value Ref Range    WBC 11.3 (H) 3.6 - 11.0 K/uL    RBC 3.98 3.80 - 5.20 M/uL    HGB 12.7 11.5 - 16.0 g/dL    HCT 41.5 35.0 - 47.0 %    .3 (H) 80.0 - 99.0 FL    MCH 31.9 26.0 - 34.0 PG    MCHC 30.6 30.0 - 36.5 g/dL    RDW 14.1 11.5 - 14.5 %    PLATELET 436 426 - 758 K/uL    MPV 10.6 8.9 - 12.9 FL    NRBC 0.0 0  WBC    ABSOLUTE NRBC 0.00 0.00 - 0.01 K/uL    NEUTROPHILS 82 (H) 32 - 75 %    BAND NEUTROPHILS 1 %    LYMPHOCYTES 12 12 - 49 %    MONOCYTES 4 (L) 5 - 13 %    EOSINOPHILS 0 0 - 7 %    BASOPHILS 0 0 - 1 %    METAMYELOCYTES 1 %    IMMATURE GRANULOCYTES 0 0.0 - 0.5 %    ABS. NEUTROPHILS 9.4 (H) 1.8 - 8.0 K/UL    ABS. LYMPHOCYTES 1.4 0.8 - 3.5 K/UL    ABS. MONOCYTES 0.5 0.0 - 1.0 K/UL    ABS. EOSINOPHILS 0.0 0.0 - 0.4 K/UL    ABS. BASOPHILS 0.0 0.0 - 0.1 K/UL    ABS. IMM.  GRANS. 0.0 0.00 - 0.04 K/UL    DF MANUAL      RBC COMMENTS NORMOCYTIC, NORMOCHROMIC     METABOLIC PANEL, COMPREHENSIVE    Collection Time: 11/19/19  5:43 AM   Result Value Ref Range    Sodium 128 (L) 136 - 145 mmol/L    Potassium 6.0 (H) 3.5 - 5.1 mmol/L    Chloride 93 (L) 97 - 108 mmol/L    CO2 <5 (LL) 21 - 32 mmol/L    Anion gap Cannot be calculated 5 - 15 mmol/L    Glucose 953 (HH) 65 - 100 mg/dL    BUN 43 (H) 6 - 20 MG/DL    Creatinine 2.15 (H) 0.55 - 1.02 MG/DL    BUN/Creatinine ratio 20 12 - 20      GFR est AA 27 (L) >60 ml/min/1.73m2    GFR est non-AA 22 (L) >60 ml/min/1.73m2    Calcium 9.0 8.5 - 10.1 MG/DL    Bilirubin, total 0.5 0.2 - 1.0 MG/DL    ALT (SGPT) 41 12 - 78 U/L    AST (SGOT) 25 15 - 37 U/L    Alk. phosphatase 168 (H) 45 - 117 U/L    Protein, total 7.2 6.4 - 8.2 g/dL    Albumin 3.7 3.5 - 5.0 g/dL    Globulin 3.5 2.0 - 4.0 g/dL    A-G Ratio 1.1 1.1 - 2.2     CK    Collection Time: 11/19/19  5:43 AM   Result Value Ref Range    CK 64 26 - 192 U/L   TROPONIN I    Collection Time: 11/19/19  5:43 AM   Result Value Ref Range    Troponin-I, Qt. <0.05 <0.05 ng/mL   NT-PRO BNP    Collection Time: 11/19/19  5:43 AM   Result Value Ref Range    NT pro- (H) <450 PG/ML   CULTURE, BLOOD, PAIRED    Collection Time: 11/19/19  5:43 AM   Result Value Ref Range    Special Requests: NO SPECIAL REQUESTS      Culture result: NO GROWTH AFTER 1 HOUR     SAMPLES BEING HELD    Collection Time: 11/19/19  5:43 AM   Result Value Ref Range    SAMPLES BEING HELD  BLUE RD     COMMENT        Add-on orders for these samples will be processed based on acceptable specimen integrity and analyte stability, which may vary by analyte.    MAGNESIUM    Collection Time: 11/19/19  5:43 AM   Result Value Ref Range    Magnesium 2.8 (H) 1.6 - 2.4 mg/dL   PHOSPHORUS    Collection Time: 11/19/19  5:43 AM   Result Value Ref Range    Phosphorus 8.4 (H) 2.6 - 4.7 MG/DL   POC LACTIC ACID    Collection Time: 11/19/19  5:51 AM   Result Value Ref Range    Lactic Acid (POC) 7.41 (HH) 0.40 - 2.00 mmol/L   POC VENOUS BLOOD GAS    Collection Time: 11/19/19  5:57 AM   Result Value Ref Range    Device: ROOM AIR      pH, venous (POC) 6.914 (LL) 7.32 - 7.42      pCO2, venous (POC) 16.7 (L) 41 - 51 MMHG    pO2, venous (POC) 60 (H) 25 - 40 mmHg    HCO3, venous (POC) 3.4 (L) 23.0 - 28.0 MMOL/L    sO2, venous (POC) 71 65 - 88 %    Base deficit, venous (POC) 29 mmol/L    Allens test (POC) N/A      Total resp.  rate 24      Site OTHER      Specimen type (POC) VENOUS BLOOD     GLUCOSE, POC    Collection Time: 11/19/19  6:44 AM   Result Value Ref Range    Glucose (POC) >600 (HH) 65 - 100 mg/dL    Performed by Meghan Paul (traveler)    URINALYSIS W/ REFLEX CULTURE    Collection Time: 11/19/19  7:55 AM   Result Value Ref Range    Color YELLOW/STRAW      Appearance CLOUDY (A) CLEAR      Specific gravity 1.023 1.003 - 1.030      pH (UA) 5.0 5.0 - 8.0      Protein 100 (A) NEG mg/dL    Glucose >1,000 (A) NEG mg/dL    Ketone 40 (A) NEG mg/dL    Bilirubin NEGATIVE  NEG      Blood SMALL (A) NEG      Urobilinogen 0.2 0.2 - 1.0 EU/dL    Nitrites NEGATIVE  NEG      Leukocyte Esterase NEGATIVE  NEG      WBC 0-4 0 - 4 /hpf    RBC 0-5 0 - 5 /hpf    Epithelial cells FEW FEW /lpf    Bacteria 1+ (A) NEG /hpf    UA:UC IF INDICATED URINE CULTURE ORDERED (A) CNI      Mucus TRACE (A) NEG /lpf    Hyaline cast 2-5 0 - 5 /lpf    Granular cast 2-5 (A) NEG /lpf   GLUCOSE, POC    Collection Time: 11/19/19  8:24 AM   Result Value Ref Range    Glucose (POC) >600 (HH) 65 - 100 mg/dL    Performed by Unkown

## 2019-11-19 NOTE — PROGRESS NOTES
Pharmacy Levothyroxine IV Dosing Protocol    PTA Levothyroxine PO dose: 175 mcg po qdaily (per chart review and Rx claims' history)    Oral interacting medications: none identified       - Approved by [employer], [facility] and [facility]:  o Seven (7) day hold unless patient meets listed exception  o 75% bioavailability will be used when converting patients to and from IV/PO levothyroxine therapy per 2014 Guidelines for Treatment of Hypothyroidism recommendation   o Patients will be changed to PO as soon as enteral route is available  o Following 7-day chely period, daily IV levothyroxine therapy will commence unless specifically stated to start earlier from a prescriber       Impression/Plan:   - Levothyroxine 175 mcg po daily * 0.75 = levothyroxine 131.25 mcg IV daily, round to 130 mcg   - To begin 11/26/19 per P&T-approved protocol     Pharmacy will follow daily     Thank you,  Malou Lares, PHARMD         [email or web address] pdf#%5B%7B%22num%22%3A67%2C%22gen%22%3A0%7D%2C%7B%22name%22%3A%22XYZ%22%7D%2C33%2C351%2C0%5D

## 2019-11-19 NOTE — PROGRESS NOTES
Spoke with primary RN Macie Coy regarding Sepsis bundle completion. RN verbalized understanding. Will continue to monitor. Thank you,   ASHTYN Hardy

## 2019-11-19 NOTE — PROGRESS NOTES
1035 - Arrived via stretcher from ER. G bath and nozin complete. NS and insulin infusing to PIV R arm. Monitor attached and GL obtained. See flowsheets and assessment. 1330 - BMP and Lactic collected and sent to lab. 1545 - ABG complete. Reviewed by Dr Jaye Saldivar. New orders received. 1725 - BMP and lactic collected and sent to lab. 1810 - . IVF changed from NS to D5 1/2NS +20 KCL at 100/hr and continue HCO3 gtt at 50/hr.    1900 - Dr Jaye Saldivar notified of new labs. No new orders at this time. 1945 - Shift report given to [place] [facility].

## 2019-11-20 LAB
ALBUMIN SERPL-MCNC: 2.6 G/DL (ref 3.5–5)
ALBUMIN/GLOB SERPL: 0.9 {RATIO} (ref 1.1–2.2)
ALP SERPL-CCNC: 87 U/L (ref 45–117)
ALT SERPL-CCNC: 30 U/L (ref 12–78)
ANION GAP SERPL CALC-SCNC: 6 MMOL/L (ref 5–15)
AST SERPL-CCNC: 32 U/L (ref 15–37)
BACTERIA SPEC CULT: NORMAL
BILIRUB SERPL-MCNC: 0.3 MG/DL (ref 0.2–1)
BUN SERPL-MCNC: 28 MG/DL (ref 6–20)
BUN/CREAT SERPL: 22 (ref 12–20)
CALCIUM SERPL-MCNC: 7.8 MG/DL (ref 8.5–10.1)
CC UR VC: NORMAL
CHLORIDE SERPL-SCNC: 116 MMOL/L (ref 97–108)
CO2 SERPL-SCNC: 26 MMOL/L (ref 21–32)
CREAT SERPL-MCNC: 1.29 MG/DL (ref 0.55–1.02)
ERYTHROCYTE [DISTWIDTH] IN BLOOD BY AUTOMATED COUNT: 13.3 % (ref 11.5–14.5)
GLOBULIN SER CALC-MCNC: 2.8 G/DL (ref 2–4)
GLUCOSE BLD STRIP.AUTO-MCNC: 101 MG/DL (ref 65–100)
GLUCOSE BLD STRIP.AUTO-MCNC: 103 MG/DL (ref 65–100)
GLUCOSE BLD STRIP.AUTO-MCNC: 105 MG/DL (ref 65–100)
GLUCOSE BLD STRIP.AUTO-MCNC: 106 MG/DL (ref 65–100)
GLUCOSE BLD STRIP.AUTO-MCNC: 124 MG/DL (ref 65–100)
GLUCOSE BLD STRIP.AUTO-MCNC: 142 MG/DL (ref 65–100)
GLUCOSE BLD STRIP.AUTO-MCNC: 149 MG/DL (ref 65–100)
GLUCOSE BLD STRIP.AUTO-MCNC: 205 MG/DL (ref 65–100)
GLUCOSE BLD STRIP.AUTO-MCNC: 216 MG/DL (ref 65–100)
GLUCOSE BLD STRIP.AUTO-MCNC: 220 MG/DL (ref 65–100)
GLUCOSE BLD STRIP.AUTO-MCNC: 240 MG/DL (ref 65–100)
GLUCOSE BLD STRIP.AUTO-MCNC: 250 MG/DL (ref 65–100)
GLUCOSE BLD STRIP.AUTO-MCNC: 78 MG/DL (ref 65–100)
GLUCOSE BLD STRIP.AUTO-MCNC: 91 MG/DL (ref 65–100)
GLUCOSE SERPL-MCNC: 86 MG/DL (ref 65–100)
HCT VFR BLD AUTO: 35.9 % (ref 35–47)
HGB BLD-MCNC: 12.4 G/DL (ref 11.5–16)
LACTATE SERPL-SCNC: 2.2 MMOL/L (ref 0.4–2)
MAGNESIUM SERPL-MCNC: 2.2 MG/DL (ref 1.6–2.4)
MCH RBC QN AUTO: 31.9 PG (ref 26–34)
MCHC RBC AUTO-ENTMCNC: 34.5 G/DL (ref 30–36.5)
MCV RBC AUTO: 92.3 FL (ref 80–99)
NRBC # BLD: 0 K/UL (ref 0–0.01)
NRBC BLD-RTO: 0 PER 100 WBC
PHOSPHATE SERPL-MCNC: 0.9 MG/DL (ref 2.6–4.7)
PLATELET # BLD AUTO: 194 K/UL (ref 150–400)
PMV BLD AUTO: 10.2 FL (ref 8.9–12.9)
POTASSIUM SERPL-SCNC: 3.3 MMOL/L (ref 3.5–5.1)
PROT SERPL-MCNC: 5.4 G/DL (ref 6.4–8.2)
RBC # BLD AUTO: 3.89 M/UL (ref 3.8–5.2)
SERVICE CMNT-IMP: ABNORMAL
SERVICE CMNT-IMP: NORMAL
SODIUM SERPL-SCNC: 148 MMOL/L (ref 136–145)
WBC # BLD AUTO: 6 K/UL (ref 3.6–11)

## 2019-11-20 PROCEDURE — 74011000250 HC RX REV CODE- 250: Performed by: FAMILY MEDICINE

## 2019-11-20 PROCEDURE — 74011000250 HC RX REV CODE- 250: Performed by: INTERNAL MEDICINE

## 2019-11-20 PROCEDURE — 74011000250 HC RX REV CODE- 250: Performed by: EMERGENCY MEDICINE

## 2019-11-20 PROCEDURE — 74011636637 HC RX REV CODE- 636/637: Performed by: INTERNAL MEDICINE

## 2019-11-20 PROCEDURE — 84100 ASSAY OF PHOSPHORUS: CPT

## 2019-11-20 PROCEDURE — 74011250636 HC RX REV CODE- 250/636: Performed by: INTERNAL MEDICINE

## 2019-11-20 PROCEDURE — 80053 COMPREHEN METABOLIC PANEL: CPT

## 2019-11-20 PROCEDURE — 74011000258 HC RX REV CODE- 258: Performed by: HOSPITALIST

## 2019-11-20 PROCEDURE — 85027 COMPLETE CBC AUTOMATED: CPT

## 2019-11-20 PROCEDURE — 74011250637 HC RX REV CODE- 250/637: Performed by: INTERNAL MEDICINE

## 2019-11-20 PROCEDURE — 74011250636 HC RX REV CODE- 250/636: Performed by: FAMILY MEDICINE

## 2019-11-20 PROCEDURE — 83735 ASSAY OF MAGNESIUM: CPT

## 2019-11-20 PROCEDURE — 74011250636 HC RX REV CODE- 250/636: Performed by: HOSPITALIST

## 2019-11-20 PROCEDURE — 36415 COLL VENOUS BLD VENIPUNCTURE: CPT

## 2019-11-20 PROCEDURE — 82962 GLUCOSE BLOOD TEST: CPT

## 2019-11-20 PROCEDURE — 65660000000 HC RM CCU STEPDOWN

## 2019-11-20 RX ORDER — ASPIRIN 325 MG
325 TABLET ORAL DAILY
Status: DISCONTINUED | OUTPATIENT
Start: 2019-11-20 | End: 2019-11-24 | Stop reason: HOSPADM

## 2019-11-20 RX ORDER — MAGNESIUM SULFATE 100 %
4 CRYSTALS MISCELLANEOUS AS NEEDED
Status: DISCONTINUED | OUTPATIENT
Start: 2019-11-20 | End: 2019-11-24 | Stop reason: HOSPADM

## 2019-11-20 RX ORDER — DEXTROSE 50 % IN WATER (D50W) INTRAVENOUS SYRINGE
12.5-25 AS NEEDED
Status: DISCONTINUED | OUTPATIENT
Start: 2019-11-20 | End: 2019-11-24 | Stop reason: HOSPADM

## 2019-11-20 RX ORDER — INSULIN GLARGINE 100 [IU]/ML
30 INJECTION, SOLUTION SUBCUTANEOUS DAILY
Status: DISCONTINUED | OUTPATIENT
Start: 2019-11-20 | End: 2019-11-20

## 2019-11-20 RX ORDER — INSULIN LISPRO 100 [IU]/ML
INJECTION, SOLUTION INTRAVENOUS; SUBCUTANEOUS
Status: DISCONTINUED | OUTPATIENT
Start: 2019-11-20 | End: 2019-11-22

## 2019-11-20 RX ORDER — DEXTROSE, SODIUM CHLORIDE, AND POTASSIUM CHLORIDE 5; .45; .15 G/100ML; G/100ML; G/100ML
100 INJECTION INTRAVENOUS CONTINUOUS
Status: ACTIVE | OUTPATIENT
Start: 2019-11-20 | End: 2019-11-20

## 2019-11-20 RX ORDER — DEXTROSE, SODIUM CHLORIDE, AND POTASSIUM CHLORIDE 5; .45; .15 G/100ML; G/100ML; G/100ML
100 INJECTION INTRAVENOUS CONTINUOUS
Status: DISCONTINUED | OUTPATIENT
Start: 2019-11-20 | End: 2019-11-20

## 2019-11-20 RX ORDER — ENOXAPARIN SODIUM 100 MG/ML
40 INJECTION SUBCUTANEOUS EVERY 24 HOURS
Status: DISCONTINUED | OUTPATIENT
Start: 2019-11-20 | End: 2019-11-24 | Stop reason: HOSPADM

## 2019-11-20 RX ORDER — INSULIN GLARGINE 100 [IU]/ML
15 INJECTION, SOLUTION SUBCUTANEOUS DAILY
Status: DISCONTINUED | OUTPATIENT
Start: 2019-11-20 | End: 2019-11-21

## 2019-11-20 RX ADMIN — DEXTROSE MONOHYDRATE 12.5 G: 500 INJECTION PARENTERAL at 00:37

## 2019-11-20 RX ADMIN — POTASSIUM PHOSPHATE, MONOBASIC AND POTASSIUM PHOSPHATE, DIBASIC: 224; 236 INJECTION, SOLUTION, CONCENTRATE INTRAVENOUS at 07:33

## 2019-11-20 RX ADMIN — Medication 10 ML: at 22:02

## 2019-11-20 RX ADMIN — Medication 1 AMPULE: at 08:35

## 2019-11-20 RX ADMIN — Medication 10 ML: at 06:00

## 2019-11-20 RX ADMIN — INSULIN GLARGINE 15 UNITS: 100 INJECTION, SOLUTION SUBCUTANEOUS at 10:47

## 2019-11-20 RX ADMIN — DIBASIC SODIUM PHOSPHATE, MONOBASIC POTASSIUM PHOSPHATE AND MONOBASIC SODIUM PHOSPHATE 1 TABLET: 852; 155; 130 TABLET ORAL at 12:34

## 2019-11-20 RX ADMIN — BRIMONIDINE TARTRATE 1 DROP: 2 SOLUTION OPHTHALMIC at 08:36

## 2019-11-20 RX ADMIN — Medication 10 ML: at 14:30

## 2019-11-20 RX ADMIN — BRIMONIDINE TARTRATE 1 DROP: 2 SOLUTION OPHTHALMIC at 18:00

## 2019-11-20 RX ADMIN — SODIUM CHLORIDE: 900 INJECTION, SOLUTION INTRAVENOUS at 15:34

## 2019-11-20 RX ADMIN — ASPIRIN 325 MG: 325 TABLET, FILM COATED ORAL at 12:34

## 2019-11-20 RX ADMIN — INSULIN LISPRO 2 UNITS: 100 INJECTION, SOLUTION INTRAVENOUS; SUBCUTANEOUS at 22:02

## 2019-11-20 RX ADMIN — CEFTRIAXONE 1 G: 1 INJECTION, POWDER, FOR SOLUTION INTRAMUSCULAR; INTRAVENOUS at 08:47

## 2019-11-20 RX ADMIN — DIBASIC SODIUM PHOSPHATE, MONOBASIC POTASSIUM PHOSPHATE AND MONOBASIC SODIUM PHOSPHATE 1 TABLET: 852; 155; 130 TABLET ORAL at 18:26

## 2019-11-20 RX ADMIN — INSULIN LISPRO 5 UNITS: 100 INJECTION, SOLUTION INTRAVENOUS; SUBCUTANEOUS at 18:00

## 2019-11-20 RX ADMIN — DEXTROSE MONOHYDRATE, SODIUM CHLORIDE, AND POTASSIUM CHLORIDE 100 ML/HR: 50; 4.5; 1.49 INJECTION, SOLUTION INTRAVENOUS at 06:17

## 2019-11-20 RX ADMIN — ENOXAPARIN SODIUM 40 MG: 40 INJECTION SUBCUTANEOUS at 10:47

## 2019-11-20 RX ADMIN — FAMOTIDINE 20 MG: 10 INJECTION, SOLUTION INTRAVENOUS at 08:47

## 2019-11-20 NOTE — PROGRESS NOTES
2035- Received report from Efrem Morales- Shift assessment performed. See flow sheet. 2130- Incontinent care performed. 0037- Insulin gtt held due to BS 78. Received D50 per protocol. 0050- Recheck 149.  0400- Reassessment performed. See flow sheet.    0700- Report given to [facility]

## 2019-11-20 NOTE — INTERDISCIPLINARY ROUNDS
Interdisciplinary team rounds were held 11/20/2019 with the following team members:Care Management, Nursing, Nutrition, Pharmacy, Physical Therapy, Physician and Respiratory Therapy. Plan of care discussed. See clinical pathway and/or care plan for interventions and desired outcomes.

## 2019-11-20 NOTE — PROGRESS NOTES
General Daily Progress Note    Admit Date: 11/19/2019    Subjective:     Patient has no complaint--lethargic. Current Facility-Administered Medications   Medication Dose Route Frequency    potassium phosphate 15 mmol in 0.9% sodium chloride 250 mL infusion   IntraVENous ONCE    insulin glargine (LANTUS) injection 30 Units  30 Units SubCUTAneous DAILY    insulin lispro (HUMALOG) injection   SubCUTAneous TIDAC    glucose chewable tablet 16 g  4 Tab Oral PRN    dextrose (D50W) injection syrg 12.5-25 g  12.5-25 g IntraVENous PRN    glucagon (GLUCAGEN) injection 1 mg  1 mg IntraMUSCular PRN    0.45% sodium chloride 1,000 mL with potassium chloride 30 mEq infusion   IntraVENous CONTINUOUS    dextrose 5% - 0.45% NaCl with KCl 20 mEq/L infusion  100 mL/hr IntraVENous CONTINUOUS    insulin regular (NOVOLIN R, HUMULIN R) 100 Units in 0.9% sodium chloride 100 mL infusion  0-50 Units/hr IntraVENous TITRATE    dextrose (D50W) injection syrg 12.5-25 g  25-50 mL IntraVENous PRN    sodium chloride (NS) flush 5-40 mL  5-40 mL IntraVENous Q8H    sodium chloride (NS) flush 5-40 mL  5-40 mL IntraVENous PRN    acetaminophen (TYLENOL) suppository 650 mg  650 mg Rectal Q6H PRN    ondansetron (ZOFRAN) injection 4 mg  4 mg IntraVENous Q6H PRN    cefTRIAXone (ROCEPHIN) 1 g in 0.9% sodium chloride (MBP/ADV) 50 mL  1 g IntraVENous Q24H    [START ON 11/26/2019] levothyroxine (SYNTHROID) injection 130 mcg  130 mcg IntraVENous Q24H    brimonidine (ALPHAGAN) 0.2 % ophthalmic solution 1 Drop  1 Drop Both Eyes BID    aspirin (ASA) suppository 300 mg  300 mg Rectal DAILY    famotidine (PF) (PEPCID) injection 20 mg  20 mg IntraVENous DAILY    alcohol 62% (NOZIN) nasal  1 Ampule  1 Ampule Topical Q12H        Review of Systems  Review of systems not obtained due to patient factors.     Objective:     Patient Vitals for the past 24 hrs:   BP Temp Pulse Resp SpO2 Height Weight   11/20/19 0500 124/40  99 25 99 %   11/20/19 0400 (!) 122/38  99 26 100 %     11/20/19 0300 (!) 131/39  99 28 100 %     11/20/19 0200 130/47  (!) 101 21 100 %     11/20/19 0100 129/45  (!) 101 23 100 %     11/20/19 0045 128/43  99 27 100 %     11/20/19 0030 131/43  99 26 100 %     11/20/19 0000 129/46 98 °F (36.7 °C) 96 25 100 %     11/19/19 2300 122/46  95 26 99 %     11/19/19 2200 (!) 126/37  96 20 100 %     11/19/19 2115 130/47  95 26 100 %     11/19/19 2100 132/45  96 25 100 %     11/19/19 2000 138/48 98.5 °F (36.9 °C) 95 27 100 %     11/19/19 1915 105/90  93 26 100 %     11/19/19 1900 136/43  95 12 100 %     11/19/19 1845 130/45  95 28 100 %     11/19/19 1830 128/62  98 26 100 %     11/19/19 1815 110/62  96 26 100 %     11/19/19 1800 114/42  98 22 100 %     11/19/19 1745 126/60  99 18 100 %     11/19/19 1730 140/42  97 29 100 %     11/19/19 1715 136/51  93 28 100 %     11/19/19 1700 132/47  94 27 100 %     11/19/19 1645 130/50  90 25 100 %     11/19/19 1630 130/50  91 29 100 %     11/19/19 1615 128/49  94 28 100 %     11/19/19 1600 132/46 97.5 °F (36.4 °C) 94 24 100 %     11/19/19 1545 126/43  93 26 100 %     11/19/19 1530 134/41  95 (!) 32 100 %     11/19/19 1515 140/44  93 29 100 %     11/19/19 1500 126/41  96 21 100 %     11/19/19 1445 (!) 122/34  88 27 100 %     11/19/19 1430 (!) 119/36  92 19 100 %     11/19/19 1415 (!) 110/38  86 29 100 %     11/19/19 1400 (!) 114/36  90 29 100 %     11/19/19 1345 (!) 113/37  87 24 100 %     11/19/19 1330 (!) 109/35  88 29 100 %     11/19/19 1315 (!) 112/34  90 26 100 %     11/19/19 1308      5' 5\" (1.651 m) 156 lb 8.4 oz (71 kg)   11/19/19 1300 (!) 114/35  92 24 100 %     11/19/19 1245 (!) 113/38  90 24 100 %     11/19/19 1230 (!) 111/33  91 26 100 %     11/19/19 1215 (!) 113/31  93 (!) 31 100 %     11/19/19 1200 (!) 108/31  93 28 100 %     11/19/19 1145 (!) 101/31  94 25 100 %     11/19/19 1130 (!) 100/33  98 27 100 %     11/19/19 1115   100 26 100 %     11/19/19 1100 (!) 106/29 96.2 °F (35.7 °C) 94 26 100 %     11/19/19 1000 (!) 115/30  100 27 100 %       No intake/output data recorded. 11/18 1901 - 11/20 0700  In: 7691.6 [I.V.:7691.6]  Out: 1615 [Urine:1615]    Physical Exam:   Visit Vitals  /40   Pulse 99   Temp 98 °F (36.7 °C)   Resp 25   Ht 5' 5\" (1.651 m)   Wt 156 lb 8.4 oz (71 kg)   SpO2 99%   BMI 26.05 kg/m²     General appearance: cooperative, no distress, appears stated age, lethargic  Neck: supple, symmetrical, trachea midline, no adenopathy, thyroid: not enlarged, symmetric, no tenderness/mass/nodules, no carotid bruit and no JVD  Lungs: clear to auscultation bilaterally  Heart: regular rate and rhythm, S1, S2 normal, no murmur, click, rub or gallop  Abdomen: soft, non-tender.  Bowel sounds normal. No masses,  no organomegaly  Extremities: extremities normal, atraumatic, no cyanosis or edema        Data Review   Recent Results (from the past 24 hour(s))   GLUCOSE, POC    Collection Time: 11/19/19  9:35 AM   Result Value Ref Range    Glucose (POC) >600 (HH) 65 - 100 mg/dL    Performed by Unkown     METABOLIC PANEL, BASIC    Collection Time: 11/19/19 10:02 AM   Result Value Ref Range    Sodium 137 136 - 145 mmol/L    Potassium 4.1 3.5 - 5.1 mmol/L    Chloride 108 97 - 108 mmol/L    CO2 5 (LL) 21 - 32 mmol/L    Anion gap 24 (H) 5 - 15 mmol/L    Glucose 678 (HH) 65 - 100 mg/dL    BUN 42 (H) 6 - 20 MG/DL    Creatinine 2.07 (H) 0.55 - 1.02 MG/DL    BUN/Creatinine ratio 20 12 - 20      GFR est AA 28 (L) >60 ml/min/1.73m2    GFR est non-AA 23 (L) >60 ml/min/1.73m2    Calcium 7.9 (L) 8.5 - 10.1 MG/DL   MAGNESIUM    Collection Time: 11/19/19 10:02 AM   Result Value Ref Range    Magnesium 2.6 (H) 1.6 - 2.4 mg/dL   POC G3 - PUL    Collection Time: 11/19/19 10:05 AM   Result Value Ref Range    pH (POC) 6.995 (LL) 7.35 - 7.45      pCO2 (POC) <15.0 (L) 35.0 - 45.0 MMHG    pO2 (POC) 141 (H) 80 - 100 MMHG    Site LEFT RADIAL      Device: ROOM AIR      Allens test (POC) YES      Specimen type (POC) ARTERIAL      Total resp. rate 22     GLUCOSE, POC    Collection Time: 11/19/19 10:47 AM   Result Value Ref Range    Glucose (POC) 586 (H) 65 - 100 mg/dL    Performed by Joceline Cramer, POC    Collection Time: 11/19/19 11:49 AM   Result Value Ref Range    Glucose (POC) 536 (H) 65 - 100 mg/dL    Performed by Margaret Rachel    GLUCOSE, POC    Collection Time: 11/19/19 12:51 PM   Result Value Ref Range    Glucose (POC) 496 (H) 65 - 100 mg/dL    Performed by [address] [place] [address]    Collection Time: 11/19/19  1:35 PM   Result Value Ref Range    Sodium 142 136 - 145 mmol/L    Potassium 3.4 (L) 3.5 - 5.1 mmol/L    Chloride 110 (H) 97 - 108 mmol/L    CO2 8 (LL) 21 - 32 mmol/L    Anion gap 24 (H) 5 - 15 mmol/L    Glucose 509 (H) 65 - 100 mg/dL    BUN 44 (H) 6 - 20 MG/DL    Creatinine 1.99 (H) 0.55 - 1.02 MG/DL    BUN/Creatinine ratio 22 (H) 12 - 20      GFR est AA 29 (L) >60 ml/min/1.73m2    GFR est non-AA 24 (L) >60 ml/min/1.73m2    Calcium 7.9 (L) 8.5 - 10.1 MG/DL   LACTIC ACID    Collection Time: 11/19/19  1:35 PM   Result Value Ref Range    Lactic acid 6.2 (HH) 0.4 - 2.0 MMOL/L   GLUCOSE, POC    Collection Time: 11/19/19  1:57 PM   Result Value Ref Range    Glucose (POC) 513 (H) 65 - 100 mg/dL    Performed by Joceline Cramer, POC    Collection Time: 11/19/19  2:55 PM   Result Value Ref Range    Glucose (POC) 392 (H) 65 - 100 mg/dL    Performed by Margaret Rachel    POC G3 - PUL    Collection Time: 11/19/19  3:42 PM   Result Value Ref Range    pH (POC) 7.347 (L) 7.35 - 7.45      pCO2 (POC) 22.8 (L) 35.0 - 45.0 MMHG    pO2 (POC) 86 80 - 100 MMHG    HCO3 (POC) 12.5 (L) 22 - 26 MMOL/L    sO2 (POC) 96 92 - 97 %    Base deficit (POC) 13 mmol/L    Site RIGHT RADIAL      Device: ROOM AIR      Allens test (POC) YES      Specimen type (POC) ARTERIAL      Total resp.  rate 16 GLUCOSE, POC    Collection Time: 11/19/19  4:00 PM   Result Value Ref Range    Glucose (POC) 353 (H) 65 - 100 mg/dL    Performed by Michael Ying    GLUCOSE, POC    Collection Time: 11/19/19  5:05 PM   Result Value Ref Range    Glucose (POC) 290 (H) 65 - 100 mg/dL    Performed by [address] [place] [address], BASIC    Collection Time: 11/19/19  5:19 PM   Result Value Ref Range    Sodium 150 (H) 136 - 145 mmol/L    Potassium 3.5 3.5 - 5.1 mmol/L    Chloride 116 (H) 97 - 108 mmol/L    CO2 18 (L) 21 - 32 mmol/L    Anion gap 16 (H) 5 - 15 mmol/L    Glucose 263 (H) 65 - 100 mg/dL    BUN 37 (H) 6 - 20 MG/DL    Creatinine 1.77 (H) 0.55 - 1.02 MG/DL    BUN/Creatinine ratio 21 (H) 12 - 20      GFR est AA 34 (L) >60 ml/min/1.73m2    GFR est non-AA 28 (L) >60 ml/min/1.73m2    Calcium 7.9 (L) 8.5 - 10.1 MG/DL   LACTIC ACID    Collection Time: 11/19/19  5:20 PM   Result Value Ref Range    Lactic acid 3.2 (HH) 0.4 - 2.0 MMOL/L   GLUCOSE, POC    Collection Time: 11/19/19  6:09 PM   Result Value Ref Range    Glucose (POC) 216 (H) 65 - 100 mg/dL    Performed by Familia Rose, POC    Collection Time: 11/19/19  7:22 PM   Result Value Ref Range    Glucose (POC) 200 (H) 65 - 100 mg/dL    Performed by Nella Bass    GLUCOSE, POC    Collection Time: 11/19/19  8:27 PM   Result Value Ref Range    Glucose (POC) 154 (H) 65 - 100 mg/dL    Performed by Jatinder Leon    GLUCOSE, POC    Collection Time: 11/19/19  9:31 PM   Result Value Ref Range    Glucose (POC) 154 (H) 65 - 100 mg/dL    Performed by Nella Bass    METABOLIC PANEL, BASIC    Collection Time: 11/19/19  9:44 PM   Result Value Ref Range    Sodium 150 (H) 136 - 145 mmol/L    Potassium 3.3 (L) 3.5 - 5.1 mmol/L    Chloride 116 (H) 97 - 108 mmol/L    CO2 25 21 - 32 mmol/L    Anion gap 9 5 - 15 mmol/L    Glucose 141 (H) 65 - 100 mg/dL    BUN 33 (H) 6 - 20 MG/DL    Creatinine 1.52 (H) 0.55 - 1.02 MG/DL    BUN/Creatinine ratio 22 (H) 12 - 20      GFR est AA 40 (L) >60 ml/min/1.73m2    GFR est non-AA 33 (L) >60 ml/min/1.73m2    Calcium 8.0 (L) 8.5 - 10.1 MG/DL   GLUCOSE, POC    Collection Time: 11/19/19 10:38 PM   Result Value Ref Range    Glucose (POC) 152 (H) 65 - 100 mg/dL    Performed by Shola Jones    LACTIC ACID    Collection Time: 11/19/19 11:23 PM   Result Value Ref Range    Lactic acid 2.2 (HH) 0.4 - 2.0 MMOL/L   GLUCOSE, POC    Collection Time: 11/20/19 12:32 AM   Result Value Ref Range    Glucose (POC) 78 65 - 100 mg/dL    Performed by Shola Jones    GLUCOSE, POC    Collection Time: 11/20/19 12:50 AM   Result Value Ref Range    Glucose (POC) 149 (H) 65 - 100 mg/dL    Performed by Shola Jones    GLUCOSE, POC    Collection Time: 11/20/19  1:54 AM   Result Value Ref Range    Glucose (POC) 103 (H) 65 - 100 mg/dL    Performed by Shola Jones    GLUCOSE, POC    Collection Time: 11/20/19  2:55 AM   Result Value Ref Range    Glucose (POC) 106 (H) 65 - 100 mg/dL    Performed by Shola Jones    CBC W/O DIFF    Collection Time: 11/20/19  3:43 AM   Result Value Ref Range    WBC 6.0 3.6 - 11.0 K/uL    RBC 3.89 3.80 - 5.20 M/uL    HGB 12.4 11.5 - 16.0 g/dL    HCT 35.9 35.0 - 47.0 %    MCV 92.3 80.0 - 99.0 FL    MCH 31.9 26.0 - 34.0 PG    MCHC 34.5 30.0 - 36.5 g/dL    RDW 13.3 11.5 - 14.5 %    PLATELET 443 234 - 056 K/uL    MPV 10.2 8.9 - 12.9 FL    NRBC 0.0 0  WBC    ABSOLUTE NRBC 0.00 0.00 - 0.01 K/uL   PHOSPHORUS    Collection Time: 11/20/19  3:43 AM   Result Value Ref Range    Phosphorus 0.9 (LL) 2.6 - 4.7 MG/DL   MAGNESIUM    Collection Time: 11/20/19  3:43 AM   Result Value Ref Range    Magnesium 2.2 1.6 - 2.4 mg/dL   METABOLIC PANEL, COMPREHENSIVE    Collection Time: 11/20/19  3:43 AM   Result Value Ref Range    Sodium 148 (H) 136 - 145 mmol/L    Potassium 3.3 (L) 3.5 - 5.1 mmol/L    Chloride 116 (H) 97 - 108 mmol/L    CO2 26 21 - 32 mmol/L    Anion gap 6 5 - 15 mmol/L    Glucose 86 65 - 100 mg/dL    BUN 28 (H) 6 - 20 MG/DL    Creatinine 1.29 (H) 0.55 - 1.02 MG/DL    BUN/Creatinine ratio 22 (H) 12 - 20      GFR est AA 49 (L) >60 ml/min/1.73m2    GFR est non-AA 40 (L) >60 ml/min/1.73m2    Calcium 7.8 (L) 8.5 - 10.1 MG/DL    Bilirubin, total 0.3 0.2 - 1.0 MG/DL    ALT (SGPT) 30 12 - 78 U/L    AST (SGOT) 32 15 - 37 U/L    Alk. phosphatase 87 45 - 117 U/L    Protein, total 5.4 (L) 6.4 - 8.2 g/dL    Albumin 2.6 (L) 3.5 - 5.0 g/dL    Globulin 2.8 2.0 - 4.0 g/dL    A-G Ratio 0.9 (L) 1.1 - 2.2     GLUCOSE, POC    Collection Time: 11/20/19  4:02 AM   Result Value Ref Range    Glucose (POC) 105 (H) 65 - 100 mg/dL    Performed by Toan Kerr, POC    Collection Time: 11/20/19  5:05 AM   Result Value Ref Range    Glucose (POC) 91 65 - 100 mg/dL    Performed by Donia Hail    GLUCOSE, POC    Collection Time: 11/20/19  6:09 AM   Result Value Ref Range    Glucose (POC) 101 (H) 65 - 100 mg/dL    Performed by Donia Hail    GLUCOSE, POC    Collection Time: 11/20/19  7:22 AM   Result Value Ref Range    Glucose (POC) 124 (H) 65 - 100 mg/dL    Performed by Donia Hail    GLUCOSE, POC    Collection Time: 11/20/19  8:28 AM   Result Value Ref Range    Glucose (POC) 142 (H) 65 - 100 mg/dL    Performed by Jatinder Leon            Assessment:     Active Problems:    DKA (diabetic ketoacidoses) ([facility] [place] [facility] (6/2/2017)      ONEYDA (acute kidney injury) ([facility] [place] [facility] (11/19/2019)      Acute metabolic encephalopathy (16/63/4924)        Plan:     1. Patient presented with DKA. CO2 was now normal.  Will wean off of insulin drip resume basal insulin with correctional scale insulin. 2.  Hydration adequate. 3.  Long history of noncompliance. 4.  No obvious infection present. We will continue antibiotics until culture returns.

## 2019-11-20 NOTE — PROGRESS NOTES
0700 - Shift report received from [facility]. Dr Anai Roman in to see pt.    21  - Discussed in ID rounds. Orders to transition off insulin gtt. 1230 - Insulin gtt transitioned off. IVF stopped. 1830 - Report called to [facility]. Pt to be transferred to [facility]. Called and notified of Daughter Excell Holiday of transfer.

## 2019-11-20 NOTE — CONSULTS
PULMONARY ASSOCIATES [facility] INTENSIVIST Consult Service Note  Pulmonary, Critical Care, and Sleep Medicine    Name: Elvie Moffett MRN: [id number]   : 1940 Hospital: [facility]   Date: 2019  Admission date: 2019 Hospital Day: 2       Subjective/Interval History:   Seen earlier today on rounds. Pt is unstable and acutely ill in the CCU. Patient was re-evaluated multiple times with repeated discussions with CCU team throughout the day     gap closed. Sluggish. Insulin ordered for transition. Spoke with daughter yesterday. Surveillance or two way camera may be useful for home monitoring    IMPRESSION:   1. [place]ul respirations  2. Severe DKA  3. H/o poorly controlled DM /- DKA or hypoglycemia   4. Vascular dementia- reports she forgot her insulin past couple of days   5. Acute metabolic encephalopathy, POA  Lethargic but arousable and answer questions, disoriented to time  6. Possible uti, POA with bacteriuria, AMS, tachycardia, mild leukocytosis  7. ONEYDA Cr 2.1, baseline 0.6  8. Severe metabolic acidosis due to DKA  9. Hypothyroid  10. H/o CVA   6. 2019 [facility] Elevated troponin level with negative ischemic testing. 12. Primary hypertension. 13. Compensated hypothyroidism. Body mass index is 26.05 kg/m². 14. Additional workup outlined below      RECOMMENDATIONS/PLAN:   1. Family may need to give pt insulin at home as she cannot be relied upon to do so  2. Fluid resuscitation  3. Start basal insulin at lower than usual dose until PO intake consistent since she is prone to have hypoglycemia  4. Transfer to floor  5. empiric rocephin  6. F/u urine and blood cultures  7. insulin drip per protocol  8. BMP, mag q4h  9. PO meds  10. Glucose monitoring and SSI  11. Replete electrolytes  12. Labs to follow electrolytes, renal function and and blood counts  13. Bronchial hygiene with respiratory therapy techniques, bronchodilators  14.  Pt needs IV fluids with additives and Drug therapy requiring intensive monitoring for toxicity  15. Prescription drug management with home med reconciliation reviewed  16. DVT, SUP prophylaxis  17. Will be available to assist in medical management while in the CCU pending disposition         Subjective/Initial History:   I have reviewed the flowsheet and previous days notes. Seen earlier today on rounds. I was asked by Laila Hinds MD to see Shirley Chacko a 69 y.o.  female  in consultation for a chief complaint of DKA    The patient is unable to give any meaningful history or review of systems due to patient factors. Excerpts from admission 11/19/2019 and consult notes reviewed as follows:     \"Deana Waller is a 69 y.o. female with difficult to control DM, often admission for hypoglycemia or dka. A1c 13. Patient lethargic, oriented to self and place. Denies any HA, abdominal pain, CP, SOB, cough \"    Notes from June 2019 from Dr. Jacinto Mittal reviewed: \"The patient is a 78-year-old lady presented to the emergency room because of profound fatigue and lethargy. She was found to have diabetic ketoacidosis and was indeed admitted. Her initial CO2 on her electrolytes was less than 5. She has a history of type 2B diabetes and she probably had type 1. Her compliance has been extremely poor. Her last hemoglobin A1c was 13. Efforts to correct this had been feudal.  She is maintained on a basal insulin specifically [place] as well as prandial insulin. Any increase in the basal insulin leads to significant interventional hypoglycemia. Unfortunately, she does not check blood sugars on a consistent basis and does not eat in a timely fashion as well as consuming inadequate amount of calories per feeding. I have tried to get her on CGM, but this has not been successful, but I think this will indeed make a difference now.  From a diabetic prospective, however, the patient will have a continuous glucose monitor placed in the form of [address]. She does indeed qualify for this given the fact that she is technically a type 1/2B diabetic and she takes insulin four times a day and checks blood sugars four times a day. \"     She has been seeing Dr. Manish Izaguirre in his office but BS control has still been challenging. Pt now in CCU. Admits she forgets to take insulin. No chest pain, HA, abdominal pain or fever. Does nto recall line of work. Has four children. Lives with son. Has three brothers. Never smoked. No nausea. Patient PCP: Apryl Dubose MD  PMH:  has a past medical history of Diabetes ([facility] [address] Heart failure ([address] [address] [address] Hypercholesteremia, Hypertension, Stroke [facility]), and Thyroid disease. PSH:   has a past surgical history that includes hx gyn; hx hysterectomy; pr thyroidectomy; pr removal gallbladder; and hx heent. FHX: family history includes Diabetes in her father; No Known Problems in her mother. SHX:  reports that she has never smoked. She has never used smokeless tobacco. She reports that she does not drink alcohol or use drugs. ROS:Review of systems not obtained due to patient factors.     No Known Allergies   MEDS:   Current Facility-Administered Medications   Medication    insulin lispro (HUMALOG) injection    glucose chewable tablet 16 g    dextrose (D50W) injection syrg 12.5-25 g    glucagon (GLUCAGEN) injection 1 mg    insulin glargine (LANTUS) injection 15 Units    aspirin tablet 325 mg    [START ON 11/21/2019] levothyroxine (SYNTHROID) tablet 175 mcg    potassium phosphate 20 mmol in 0.9% sodium chloride 500 mL infusion    phosphorus (K PHOS NEUTRAL) 250 mg tablet 1 Tab    enoxaparin (LOVENOX) injection 40 mg    insulin regular (NOVOLIN R, HUMULIN R) 100 Units in 0.9% sodium chloride 100 mL infusion    dextrose (D50W) injection syrg 12.5-25 g    sodium chloride (NS) flush 5-40 mL    sodium chloride (NS) flush 5-40 mL    acetaminophen (TYLENOL) suppository 650 mg    ondansetron (ZOFRAN) injection 4 mg    cefTRIAXone (ROCEPHIN) 1 g in 0.9% sodium chloride (MBP/ADV) 50 mL    brimonidine (ALPHAGAN) 0.2 % ophthalmic solution 1 Drop    alcohol 62% (NOZIN) nasal  1 Ampule          Objective:     Vital Signs: Telemetry:    normal sinus rhythm Intake/Output:   Visit Vitals  /40   Pulse 99   Temp 98 °F (36.7 °C)   Resp 25   Ht 5' 5\" (1.651 m)   Wt 71 kg (156 lb 8.4 oz)   SpO2 99%   BMI 26.05 kg/m²       Temp (24hrs), Av °F (36.7 °C), Min:97.5 °F (36.4 °C), Max:98.5 °F (36.9 °C)        O2 Device: Room air           Body mass index is 26.05 kg/m². Wt Readings from Last 4 Encounters:   19 71 kg (156 lb 8.4 oz)   19 74.5 kg (164 lb 3.2 oz)   10/15/19 73.7 kg (162 lb 8 oz)   19 74.5 kg (164 lb 4.8 oz)          Intake/Output Summary (Last 24 hours) at 2019 1411  Last data filed at 2019 0500  Gross per 24 hour   Intake 4444.51 ml   Output 1325 ml   Net 3119.51 ml       Last shift:      No intake/output data recorded. Last 3 shifts:  1901 -  0700  In: 7691.6 [I.V.:7691.6]  Out: 1615 [Urine:1615]         Physical Exam:      General:  Alert, cooperative, no distress    Head:  Normocephalic, without obvious abnormality, atraumatic. Eyes:  Conjunctivae/corneas clear. Nose: Nares normal. Septum midline. Mucosa normal.    Throat: Lips, mucosa, and tongue normal. Teeth and gums normal.   Neck: Supple, symmetrical, trachea midline   Back:   Symmetric, no curvature. ROM normal.   Lungs:   Clear to auscultation bilaterally. Chest wall:  No tenderness or deformity. Heart:  Regular rate and rhythm    Abdomen:   Soft, non-tender. Bowel sounds normal.     Extremities: Extremities normal, atraumatic, no cyanosis or edema.    Skin: Skin color, texture, turgor normal. No rashes or lesions   Neurologic: Grossly nonfocal           Labs:    Recent Labs     19  0343 19  0543   WBC 6.0 11.3*   HGB 12.4 12.7    259     Recent Labs 11/20/19  0343 11/19/19  2323 11/19/19  2144 11/19/19  1720 11/19/19  1719 11/19/19  1335 11/19/19  1002 11/19/19  0543   *  --  150*  --  150* 142 137 128*   K 3.3*  --  3.3*  --  3.5 3.4* 4.1 6.0*   *  --  116*  --  116* 110* 108 93*   CO2 26  --  25  --  18* 8* 5* <5*   GLU 86  --  141*  --  263* 509* 678* 953*   BUN 28*  --  33*  --  37* 44* 42* 43*   CREA 1.29*  --  1.52*  --  1.77* 1.99* 2.07* 2.15*   CA 7.8*  --  8.0*  --  7.9* 7.9* 7.9* 9.0   MG 2.2  --   --   --   --   --  2.6* 2.8*   PHOS 0.9*  --   --   --   --   --   --  8.4*   LAC  --  2.2*  --  3.2*  --  6.2*  --   --    ALB 2.6*  --   --   --   --   --   --  3.7   SGOT 32  --   --   --   --   --   --  25   ALT 30  --   --   --   --   --   --  41     No results for input(s): PH, PCO2, PO2, HCO3, FIO2 in the last 72 hours. Recent Labs     11/19/19  0543   CPK 64   TROIQ <0.05     Lab Results   Component Value Date/Time     (H) 11/11/2012 03:30 AM      Lab Results   Component Value Date/Time    Culture result: NO GROWTH 1 DAY 11/19/2019 07:55 AM    Culture result: NO GROWTH 1 DAY 11/19/2019 05:43 AM    Culture result: NO GROWTH 5 DAYS 10/03/2018 01:16 PM      Lab Results   Component Value Date/Time    CK 64 11/19/2019 05:43 AM     06/10/2019 06:04 PM       Imaging:  I have personally reviewed the patients radiographs and have reviewed the reports:    CXR Results  (Last 48 hours)               11/19/19 0642  XR CHEST PORT Final result    Impression:  IMPRESSION: No evidence of acute cardiopulmonary process. Low lung volumes. Narrative:  INDICATION: Chest pain       COMPARISON: Caren 10, 2019       FINDINGS: AP portable imaging of the chest performed at 6:20 AM demonstrates a   stable cardiomediastinal silhouette. Lung volumes remain low. There is no new   airspace disease or pleural effusion. Degenerative changes are present in the   thoracic spine.                Results from Hospital Encounter encounter on 11/19/19 XR CHEST PORT    Narrative INDICATION: Chest pain    COMPARISON: Ca[facility] 10, 2019    FINDINGS: AP portable imaging of the chest performed at 6:20 AM demonstrates a  stable cardiomediastinal silhouette. Lung volumes remain low. There is no new  airspace disease or pleural effusion. Degenerative changes are present in the  thoracic spine. Impression IMPRESSION: No evidence of acute cardiopulmonary process. Low lung volumes. Results from Hospital Encounter encounter on 06/10/19   XR CHEST PORT    Narrative Indication: Altered mental status    Comparison: 10/19/2018    Portable exam of the chest obtained at 1203 demonstrates heart size at the upper  limits of normal. There is no acute process in the lung fields. The osseous  structures are unremarkable. Impression Impression: No acute process. Results from [place] encounter on 10/19/18   XR CHEST PORT    Narrative INDICATION: Shortness of breath. Portable AP semiupright view of the chest.    Direct comparison made to prior chest x-ray dated October 4, 2018. Cardiomediastinal silhouette is stable. There is mild bibasilar atelectasis. No  pleural fluid is visualized. There is no pneumothorax. Osseous structures are  intact. Impression IMPRESSION: Mild bibasilar atelectasis. Results from [place] encounter on 11/19/19   CT HEAD WO CONT    Narrative EXAM: CT HEAD WO CONT    INDICATION: lethargic, hyperglycemia    COMPARISON: 6/10/2019 CT. CONTRAST: None. TECHNIQUE: Unenhanced CT of the head was performed using 5 mm images. Brain and  bone windows were generated. CT dose reduction was achieved through use of a  standardized protocol tailored for this examination and automatic exposure  control for dose modulation. FINDINGS:  Mild cortical sulcal and moderate diffuse ventricular enlargement is unchanged. Mild bilateral periventricular diminished attenuation of the cerebrum is also  unchanged.  There is no intracranial hemorrhage, extra-axial collection, mass,  mass effect or midline shift. The basilar cisterns are open. No acute infarct  is identified. The bone windows demonstrate no abnormalities. The visualized  portions of the paranasal sinuses and mastoid air cells are clear. Impression IMPRESSION: Unchanged CT imaging appearance of the head. During this entire length of time the patient's condition was unstable, unpredictable and critically ill in the CCU/ ICU. I was immediately available to the patient whose care required several interactions with nursing, multidisciplinary team members leading to multiple interventions with fluid resuscitation and medication adjustments to optimize respiratory support, hemodynamic treatment, medication changes based on repeat labs results, reviews, exams and assessments. The reason for providing this level of medical care was due to a critical illness that impaired one or more vital organ systems, such that there was a high probability of sudden or life threatening deterioration in the patient's condition. This care involved high complexity medical decision making to treat acute and unstable vital organ system failure, and to prevent further life threatening deterioration of the patients condition.  I personally:  · Reviewed the flowsheet and previous days notes  · Reviewed and summarized records or history from previous days note or discussions with staff, family  · Parenteral controlled substances - Reviewed/ Adjusted / [place] / Started  · High Risk Drug therapy requiring intensive monitoring for toxicity: eg steroids, pressors, antibiotics  · Reviewed and/or ordered Clinical lab tests  · Reviewed and/or ordered Radiology tests  · Reviewed and/or ordered of Medicine tests  · Independently visualized radiologic Images  · Reviewed the patients ECG / Telemetry  ·  discussed my assessment/management with : Consultants, Nursing, Pharmacy, Case Management, PT, OT, Respiratory Therapy, Hospitalist for coordination of care           Thank you for allowing us to participate in the care of this patient. We will follow along with you until they no longer require CCU services.     Loretta Fontana MD

## 2019-11-20 NOTE — PROGRESS NOTES
TRANSFER - IN REPORT:    Verbal report received from [facility] on [employer]  being received from CCU (unit) for routine progression of care      Report consisted of patients Situation, Background, Assessment and   Recommendations(SBAR). Information from the following report(s) SBAR was reviewed with the receiving nurse. Opportunity for questions and clarification was provided. Assessment completed upon patients arrival to unit and care assumed.

## 2019-11-21 LAB
ANION GAP SERPL CALC-SCNC: 6 MMOL/L (ref 5–15)
BNP SERPL-MCNC: 580 PG/ML
BUN SERPL-MCNC: 20 MG/DL (ref 6–20)
BUN/CREAT SERPL: 24 (ref 12–20)
CALCIUM SERPL-MCNC: 7.2 MG/DL (ref 8.5–10.1)
CHLORIDE SERPL-SCNC: 113 MMOL/L (ref 97–108)
CO2 SERPL-SCNC: 28 MMOL/L (ref 21–32)
CREAT SERPL-MCNC: 0.82 MG/DL (ref 0.55–1.02)
GLUCOSE BLD STRIP.AUTO-MCNC: 137 MG/DL (ref 65–100)
GLUCOSE BLD STRIP.AUTO-MCNC: 165 MG/DL (ref 65–100)
GLUCOSE BLD STRIP.AUTO-MCNC: 222 MG/DL (ref 65–100)
GLUCOSE BLD STRIP.AUTO-MCNC: 229 MG/DL (ref 65–100)
GLUCOSE SERPL-MCNC: 187 MG/DL (ref 65–100)
PHOSPHATE SERPL-MCNC: 2.6 MG/DL (ref 2.6–4.7)
POTASSIUM SERPL-SCNC: 3.2 MMOL/L (ref 3.5–5.1)
SERVICE CMNT-IMP: ABNORMAL
SODIUM SERPL-SCNC: 147 MMOL/L (ref 136–145)

## 2019-11-21 PROCEDURE — 74011000250 HC RX REV CODE- 250: Performed by: HOSPITALIST

## 2019-11-21 PROCEDURE — 74011250636 HC RX REV CODE- 250/636: Performed by: INTERNAL MEDICINE

## 2019-11-21 PROCEDURE — 97116 GAIT TRAINING THERAPY: CPT

## 2019-11-21 PROCEDURE — 36415 COLL VENOUS BLD VENIPUNCTURE: CPT

## 2019-11-21 PROCEDURE — 74011000258 HC RX REV CODE- 258: Performed by: INTERNAL MEDICINE

## 2019-11-21 PROCEDURE — 77030038269 HC DRN EXT URIN PURWCK BARD -A

## 2019-11-21 PROCEDURE — 80048 BASIC METABOLIC PNL TOTAL CA: CPT

## 2019-11-21 PROCEDURE — 94760 N-INVAS EAR/PLS OXIMETRY 1: CPT

## 2019-11-21 PROCEDURE — 65660000000 HC RM CCU STEPDOWN

## 2019-11-21 PROCEDURE — 84100 ASSAY OF PHOSPHORUS: CPT

## 2019-11-21 PROCEDURE — 83880 ASSAY OF NATRIURETIC PEPTIDE: CPT

## 2019-11-21 PROCEDURE — 74011636637 HC RX REV CODE- 636/637: Performed by: INTERNAL MEDICINE

## 2019-11-21 PROCEDURE — 74011250637 HC RX REV CODE- 250/637: Performed by: HOSPITALIST

## 2019-11-21 PROCEDURE — 74011250637 HC RX REV CODE- 250/637: Performed by: INTERNAL MEDICINE

## 2019-11-21 PROCEDURE — 97161 PT EVAL LOW COMPLEX 20 MIN: CPT

## 2019-11-21 PROCEDURE — 82962 GLUCOSE BLOOD TEST: CPT

## 2019-11-21 RX ORDER — INSULIN GLARGINE 100 [IU]/ML
20 INJECTION, SOLUTION SUBCUTANEOUS DAILY
Status: DISCONTINUED | OUTPATIENT
Start: 2019-11-21 | End: 2019-11-22

## 2019-11-21 RX ADMIN — DIBASIC SODIUM PHOSPHATE, MONOBASIC POTASSIUM PHOSPHATE AND MONOBASIC SODIUM PHOSPHATE 1 TABLET: 852; 155; 130 TABLET ORAL at 19:43

## 2019-11-21 RX ADMIN — INSULIN LISPRO 3 UNITS: 100 INJECTION, SOLUTION INTRAVENOUS; SUBCUTANEOUS at 11:58

## 2019-11-21 RX ADMIN — POTASSIUM CHLORIDE: 2 INJECTION, SOLUTION, CONCENTRATE INTRAVENOUS at 22:44

## 2019-11-21 RX ADMIN — INSULIN LISPRO 3 UNITS: 100 INJECTION, SOLUTION INTRAVENOUS; SUBCUTANEOUS at 08:52

## 2019-11-21 RX ADMIN — Medication 10 ML: at 14:00

## 2019-11-21 RX ADMIN — Medication 10 ML: at 03:15

## 2019-11-21 RX ADMIN — LEVOTHYROXINE SODIUM 175 MCG: 125 TABLET ORAL at 07:46

## 2019-11-21 RX ADMIN — ASPIRIN 325 MG: 325 TABLET, FILM COATED ORAL at 08:52

## 2019-11-21 RX ADMIN — ENOXAPARIN SODIUM 40 MG: 40 INJECTION SUBCUTANEOUS at 11:58

## 2019-11-21 RX ADMIN — INSULIN GLARGINE 20 UNITS: 100 INJECTION, SOLUTION SUBCUTANEOUS at 08:52

## 2019-11-21 RX ADMIN — POTASSIUM CHLORIDE: 2 INJECTION, SOLUTION, CONCENTRATE INTRAVENOUS at 09:22

## 2019-11-21 RX ADMIN — DIBASIC SODIUM PHOSPHATE, MONOBASIC POTASSIUM PHOSPHATE AND MONOBASIC SODIUM PHOSPHATE 1 TABLET: 852; 155; 130 TABLET ORAL at 08:52

## 2019-11-21 RX ADMIN — Medication 10 ML: at 22:21

## 2019-11-21 RX ADMIN — CEFTRIAXONE 1 G: 1 INJECTION, POWDER, FOR SOLUTION INTRAMUSCULAR; INTRAVENOUS at 09:22

## 2019-11-21 RX ADMIN — BRIMONIDINE TARTRATE 1 DROP: 2 SOLUTION OPHTHALMIC at 12:44

## 2019-11-21 RX ADMIN — ACETAMINOPHEN 650 MG: 650 SUPPOSITORY RECTAL at 07:46

## 2019-11-21 RX ADMIN — BRIMONIDINE TARTRATE 1 DROP: 2 SOLUTION OPHTHALMIC at 19:43

## 2019-11-21 NOTE — PROGRESS NOTES
Orders received, chart reviewed and patient evaluated by physical therapy. Pending progression with skilled acute physical therapy, recommend:  Physical therapy at least 2 days/week in the home AND ensure assist and/or supervision for safety with ADLs, functional mobility, and insulin/DM mgmt    Recommend pt mobilize with x1 person assist of nursing staff, including ambulating to bathroom and to bedside chair for all meals. Full evaluation to follow.      Aida Swann, PT, DPT

## 2019-11-21 NOTE — PROGRESS NOTES
ALEKSANDAR:    Possible SNF Placement  [employer] Auth  2nd  Medicare Letter    CM: Wayne Nicole is currently working with pt in the Neuro Unit. CM contact pt's daughter: Yulisa Lauren to review d/c needs and plans regarding pt. CM informed her of pt's recommendations of SNF Placement. Yulisa Lauren reported that she would like to talk to pt before making a decision. CM informed Yulisa Lauren that a snf list would be left by pt's bed, and once she discuss snf placement with pt and reviews list a referral can be sent to snf. CM encouraged Yulisa Lauren that if snf is a option to select 3 placements in case beds are unavailable. CM will continue to follow up with pt/family regarding possible snf placement.       Wayne Nicole, MSW, [address] [place] [address]

## 2019-11-21 NOTE — PROGRESS NOTES
Problem: Falls - Risk of  Goal: *Absence of Falls  Description  Document Kera Vazquez Fall Risk and appropriate interventions in the flowsheet. Outcome: Progressing Towards Goal  Note: Fall Risk Interventions:  Mobility Interventions: Patient to call before getting OOB              Elimination Interventions: Bed/chair exit alarm, Call light in reach              Problem: Pressure Injury - Risk of  Goal: *Prevention of pressure injury  Description  Document Madhu Scale and appropriate interventions in the flowsheet.   Outcome: Progressing Towards Goal  Note: Pressure Injury Interventions:  Sensory Interventions: Keep linens dry and wrinkle-free    Moisture Interventions: Absorbent underpads    Activity Interventions: Pressure redistribution bed/mattress(bed type)    Mobility Interventions: HOB 30 degrees or less    Nutrition Interventions: Document food/fluid/supplement intake                     Problem: DKA: Day 2  Goal: Activity/Safety  Outcome: Progressing Towards Goal

## 2019-11-21 NOTE — PROGRESS NOTES
Initial Nutrition Assessment:    INTERVENTIONS/RECOMMENDATIONS:   · Meals/Snacks: General/healthful diet: Continue consistent carb diet     ASSESSMENT:   Patient medically noted for DKA, ONEYDA, and acute encephalopathy. PMH HTN and vascular dementia. Consult received for calorie count. Unsure why this was ordered. Patient reports a good appetite and eating well; currently and PTA. 75% PO this morning. Weight has been very stable. MD note mentions \"timeliness of eating to minimize the probability of hypoglycemia. \" Would encourage intake of meals and provide snacks as needed. Patient reports liking peanut butter and crackers which she has at bedside already. Currently with hyperglycemia. Provided patient with menu to help with choosing meals and increase likelihood of PO intake. Will continue to monitor PO and assess need for ONS. Diet Order: Consistent carb  % Eaten:    Patient Vitals for the past 72 hrs:   % Diet Eaten   11/21/19 0940 75 %       Pertinent Medications: [x]Reviewed []Other: Lantus, Humalog, Synthroid, Kphos  Pertinent Labs: [x]Reviewed []Other: K+ 3.2, Na 147, -[phone]  Food Allergies: [x]None []Yes:    Last BM: 11/21  []Active     []Hyperactive  []Hypoactive       [] Absent BS  Skin:    [x] Intact   [] Incision  [] Breakdown: [] Edema []Other:    Anthropometrics:   Height: 5' 5\" (165.1 cm) Weight: 73.9 kg (163 lb)   IBW (%IBW):   ( ) UBW (%UBW):   (  %)   Last Weight Metrics:  Weight Loss Metrics 11/21/2019 11/5/2019 10/15/2019 7/9/2019 7/2/2019 6/18/2019 6/11/2019   Today's Wt 163 lb 164 lb 3.2 oz 162 lb 8 oz 164 lb 4.8 oz 162 lb 4.8 oz 164 lb 9.6 oz 167 lb   BMI 27.12 kg/m2 27.32 kg/m2 27.04 kg/m2 27.34 kg/m2 27.01 kg/m2 27.39 kg/m2 27.79 kg/m2       BMI: Body mass index is 27.12 kg/m². This BMI is indicative of:   []Underweight    []Normal    [x]Overweight    [] Obesity   [] Extreme Obesity (BMI>40)     Estimated Nutrition Needs (Based on):   1594 Kcals/day(BMR (1226) x 1. 3AF) , Protein  Carbohydrate: At Least 130 g/day  Fluids: 1600 mL/day (1ml/kcal)    Pt expected to meet estimated nutrient needs: [x]Yes []No    NUTRITION DIAGNOSES:   Problem:  Altered nutrition-related lab values      Etiology: related to DM     Signs/Symptoms: as evidenced by [phone]      NUTRITION INTERVENTIONS:  Meals/Snacks: General/healthful diet                  GOAL:   PO intake >50% of meals/snacks next 3-5 days    LEARNING NEEDS (Diet, Food/Nutrient-Drug Interaction):    [x] None Identified   [] Identified and Education Provided/Documented   [] Identified and Pt declined/was not appropriate     Cultural, Baptism, OR Ethnic Dietary Needs:    [x] None Identified   [] Identified and Addressed     [x] Interdisciplinary Care Plan Reviewed/Documented    [x] Discharge Planning: CCD + snacks      MONITORING /EVALUATION:   Food/Nutrient Intake Outcomes:  Total energy intake  Physical Signs/Symptoms Outcomes: Weight/weight change, Glucose profile, Electrolyte and renal profile    NUTRITION RISK:    [x] Patient At Nutritional Risk              [] Patient Not at Nutritional Risk    PT SEEN FOR:    [x]  MD Consult: [x]Calorie Count       []Diabetic Diet Education        []Diet Education     []Electrolyte Management     []General Nutrition Management and Supplements     []Management of Tube Feeding     []TPN Recommendations    []  RN Referral:  []MST score >=2     []Enteral/Parenteral Nutrition PTA     []Pregnant: Gestational DM or Multigestation     []Pressure Ulcer/Wound Care needs        []  Low BMI  []  ANGELA Castellanos 1504  Pager [phone]    Weekend Pager [phone]

## 2019-11-21 NOTE — PROGRESS NOTES
TRANSFER - IN REPORT:    Verbal report received from [address] [place] [address] on [address]  being received from CCU (unit) for routine progression of care      Report consisted of patients Situation, Background, Assessment and   Recommendations(SBAR). Information from the following report(s) SBAR was reviewed with the receiving nurse. Opportunity for questions and clarification was provided. Per Nurse Julieta García patient has been looking for dentures nurse assisted in looking for dentures are not present.

## 2019-11-21 NOTE — PROGRESS NOTES
Problem: Mobility Impaired (Adult and Pediatric)  Goal: *Acute Goals and Plan of Care (Insert Text)  Description  FUNCTIONAL STATUS PRIOR TO ADMISSION: Patient was independent and active without use of DME. Reports history of 1 fall over previous 6 months. States she is still driving. Does all cooking, cleaning, household chores. HOME SUPPORT PRIOR TO ADMISSION: The patient lived with 2 sons. Pt states one son is \"special needs\" and home with her during the day, other son works full-time and is only home at night time    Physical Therapy Goals  Initiated 11/21/2019  1. Patient will move from supine to sit and sit to supine , scoot up and down and roll side to side in bed with independence within 7 day(s). 2.  Patient will transfer from bed to chair and chair to bed with supervision/set-up using the least restrictive device within 7 day(s). 3.  Patient will perform sit to stand with supervision/set-up within 7 day(s). 4.  Patient will ambulate with supervision/set-up for 200 feet with the least restrictive device within 7 day(s). 5.  Patient will ascend/descend 12 stairs with 1 handrail(s) with supervision/set-up within 7 day(s). Outcome: Progressing Towards Goal   PHYSICAL THERAPY EVALUATION  Patient: Ottoniel Chiang (18 y.o. female)  Date: 11/21/2019  Primary Diagnosis: DKA (diabetic ketoacidoses) ([facility] [N60.23]  Acute metabolic encephalopathy [O20.37]  ONEYDA (acute kidney injury) ([facility] [N17.9]        Precautions:          ASSESSMENT  Based on the objective data described below, the patient presents with generalized weakness, intermittent confusion, mild impairments in higher level balance, and overall mild impairment in functional mobility from baseline independent level. Pt tolerated therapy session well however with minor impairments in higher level balance, requiring SB/CGAx1 during ambulation trial of 160ft.  Pt likely close to her baseline level however would benefit from additional skilled intervention to optimize safe functional mobility. Current Level of Function Impacting Discharge (mobility/balance): SB/CGAx1 during ambulation w/o device    Functional Outcome Measure: The patient scored 65/100 on the Barthel Index outcome measure which is indicative of moderate impairment in ADLs and functional mobility. Other factors to consider for discharge: intermittent confusion, noncompliance w/ managing blood sugars/diabetes     Patient will benefit from skilled therapy intervention to address the above noted impairments. PLAN :  Recommendations and Planned Interventions: bed mobility training, transfer training, gait training, therapeutic exercises, patient and family training/education, and therapeutic activities      Frequency/Duration: Patient will be followed by physical therapy:  4 times a week to address goals. Recommendation for discharge: (in order for the patient to meet his/her long term goals)  Physical therapy at least 2 days/week in the home AND ensure assist and/or supervision for safety with functional mobility and ADLs     This discharge recommendation:  Has been made in collaboration with the attending provider and/or case management    IF patient discharges home will need the following DME: patient owns DME required for discharge         SUBJECTIVE:   Patient stated I feel better up and walking.     OBJECTIVE DATA SUMMARY:   HISTORY:    Past Medical History:   Diagnosis Date    Diabetes ([facility]     Heart failure ([facility]     unknown to family    Hypercholesteremia     Hypertension     Stroke [facility])     Thyroid disease      Past Surgical History:   Procedure Laterality Date    HX GYN      HX HEENT      thyroidectomy    HX HYSTERECTOMY      REMOVAL GALLBLADDER      THYROIDECTOMY         Personal factors and/or comorbidities impacting plan of care:     Home Situation  Home Environment: Private residence  # Steps to Enter: 3  Rails to Enter: Yes  Hand Rails : Right  One/Two Story Residence: Two story  # of Interior Steps: 12  Interior Rails: Right  Lift Chair Available: No  Living Alone: No  Support Systems: Child(oumar), Family member(s)  Patient Expects to be Discharged to[de-identified] Private residence  Current DME Used/Available at Home: None  Tub or Shower Type: Shower    EXAMINATION/PRESENTATION/DECISION MAKING:   Critical Behavior:  Neurologic State: Alert  Orientation Level: Disoriented to situation, Disoriented to time, Oriented to person, Oriented to place  Cognition: Appropriate for age attention/concentration     Hearing: Auditory  Auditory Impairment: None  Skin:  intact  Edema: none noted   Range Of Motion:  AROM: Within functional limits                       Strength:    Strength: Generally decreased, functional                    Tone & Sensation:   Tone: Normal                              Coordination:  Coordination: Within functional limits  Vision:      Functional Mobility:  Bed Mobility:  Rolling: Independent  Supine to Sit: Independent     Scooting: Independent  Transfers:  Sit to Stand: Supervision  Stand to Sit: Supervision        Bed to Chair: Stand-by assistance;Contact guard assistance              Balance:   Sitting: Intact  Standing: Impaired  Standing - Static: Good  Standing - Dynamic : Fair  Ambulation/Gait Training:  Distance (ft): 160 Feet (ft)  Assistive Device: Gait belt  Ambulation - Level of Assistance: Stand-by assistance;Contact guard assistance        Gait Abnormalities: Decreased step clearance; Path deviations;Trunk sway increased              Speed/Deepika: Pace decreased (<100 feet/min)  Step Length: Left shortened;Right shortened                Functional Measure:  Barthel Index:    Bathin  Bladder: 5  Bowels: 10  Groomin  Dressin  Feeding: 10  Mobility: 10  Stairs: 0  Toilet Use: 10  Transfer (Bed to Chair and Back): 10  Total: 65/100       The Barthel ADL Index: Guidelines  1.  The index should be used as a record of what a patient does, not as a record of what a patient could do. 2. The main aim is to establish degree of independence from any help, physical or verbal, however minor and for whatever reason. 3. The need for supervision renders the patient not independent. 4. A patient's performance should be established using the best available evidence. Asking the patient, friends/relatives and nurses are the usual sources, but direct observation and common sense are also important. However direct testing is not needed. 5. Usually the patient's performance over the preceding 24-48 hours is important, but occasionally longer periods will be relevant. 6. Middle categories imply that the patient supplies over 50 per cent of the effort. 7. Use of aids to be independent is allowed. Sarahy Beltran., Barthel, D.W. (9394). Functional evaluation: the Barthel Index. [facility] (14)2. LINDA To, Eugene Garcia., Francbiju Hahn., Leo, [facility] (1999). Measuring the change indisability after inpatient rehabilitation; comparison of the responsiveness of the Barthel Index and Functional Beatrice Measure. Journal of Neurology, Neurosurgery, and Psychiatry, 66(4), 215-335. Annamarie Hurley, N.J.A, ASH Mckay.LOGAN, & Meghann Valencia M.A. (2004.) Assessment of post-stroke quality of life in cost-effectiveness studies: The usefulness of the Barthel Index and the EuroQoL-5D.  Quality of Life Research, 15, 191-11           Physical Therapy Evaluation Charge Determination   History Examination Presentation Decision-Making   MEDIUM  Complexity : 1-2 comorbidities / personal factors will impact the outcome/ POC  MEDIUM Complexity : 3 Standardized tests and measures addressing body structure, function, activity limitation and / or participation in recreation  MEDIUM Complexity : Evolving with changing characteristics  MEDIUM Complexity : FOTO score of 26-74      Based on the above components, the patient evaluation is determined to be of the following complexity level: MEDIUM    Pain Rating:  Denied complaints of pain    Activity Tolerance:   WNL and Good  Please refer to the flowsheet for vital signs taken during this treatment. After treatment patient left in no apparent distress:   Sitting in chair, Call bell within reach, and Bed / chair alarm activated    COMMUNICATION/EDUCATION:   The patients plan of care was discussed with: Registered Nurse and . Fall prevention education was provided and the patient/caregiver indicated understanding., Patient/family have participated as able in goal setting and plan of care. , and Patient/family agree to work toward stated goals and plan of care.     Thank you for this referral.  Wendy Whitney, PT, DPT   Time Calculation: 25 mins

## 2019-11-21 NOTE — PROGRESS NOTES
General Daily Progress Note    Admit Date: 11/19/2019    Subjective:     Patient has no complaint . Current Facility-Administered Medications   Medication Dose Route Frequency    insulin glargine (LANTUS) injection 20 Units  20 Units SubCUTAneous DAILY    0.45% sodium chloride 1,000 mL with potassium chloride 40 mEq infusion   IntraVENous CONTINUOUS    insulin lispro (HUMALOG) injection   SubCUTAneous TIDAC    glucose chewable tablet 16 g  4 Tab Oral PRN    dextrose (D50W) injection syrg 12.5-25 g  12.5-25 g IntraVENous PRN    glucagon (GLUCAGEN) injection 1 mg  1 mg IntraMUSCular PRN    aspirin tablet 325 mg  325 mg Oral DAILY    levothyroxine (SYNTHROID) tablet 175 mcg  175 mcg Oral ACB    phosphorus (K PHOS NEUTRAL) 250 mg tablet 1 Tab  1 Tab Oral BID    enoxaparin (LOVENOX) injection 40 mg  40 mg SubCUTAneous Q24H    sodium chloride (NS) flush 5-40 mL  5-40 mL IntraVENous Q8H    sodium chloride (NS) flush 5-40 mL  5-40 mL IntraVENous PRN    acetaminophen (TYLENOL) suppository 650 mg  650 mg Rectal Q6H PRN    ondansetron (ZOFRAN) injection 4 mg  4 mg IntraVENous Q6H PRN    cefTRIAXone (ROCEPHIN) 1 g in 0.9% sodium chloride (MBP/ADV) 50 mL  1 g IntraVENous Q24H    brimonidine (ALPHAGAN) 0.2 % ophthalmic solution 1 Drop  1 Drop Both Eyes BID        Review of Systems  A comprehensive review of systems was negative.     Objective:     Patient Vitals for the past 24 hrs:   BP Temp Pulse Resp SpO2 Weight   11/21/19 0747 155/76 98.4 °F (36.9 °C) 92 18 100 %    11/21/19 0330      163 lb (73.9 kg)   11/21/19 0253 139/54 97.7 °F (36.5 °C) 97 16 99 %    11/20/19 2221 146/59 98 °F (36.7 °C) 97 18 100 %    11/20/19 2032 152/64 98.5 °F (36.9 °C) 95 20 99 %    11/20/19 1800 158/69  100 21 99 %    11/20/19 1700 145/61    100 %    11/20/19 1600 111/49 98.2 °F (36.8 °C) 95 22 99 %    11/20/19 1500 104/43  96 22 98 %    11/20/19 1400 139/50  (!) 104 28 99 %    11/20/19 1300 135/50  100 25 99 %    11/20/19 1200 (!) 107/39 98.1 °F (36.7 °C) 90 23 98 %    11/20/19 1100 (!) 109/39  93 23 100 %    11/20/19 1000 (!) 102/34  93 24 98 %    11/20/19 0900 (!) 117/37  100 27 98 %      No intake/output data recorded. 11/19 1901 - 11/21 0700  In: 2018 [I.V.:2018]  Out: 1625 [Urine:1625]    Physical Exam:   Visit Vitals  /76   Pulse 92   Temp 98.4 °F (36.9 °C)   Resp 18   Ht 5' 5\" (1.651 m)   Wt 163 lb (73.9 kg)   SpO2 100%   BMI 27.12 kg/m²     General appearance: alert, cooperative, no distress, appears stated age  Neck: supple, symmetrical, trachea midline, no adenopathy, thyroid: not enlarged, symmetric, no tenderness/mass/nodules, no carotid bruit and no JVD  Lungs: clear to auscultation bilaterally  Heart: regular rate and rhythm, S1, S2 normal, no murmur, click, rub or gallop  Abdomen: soft, non-tender.  Bowel sounds normal. No masses,  no organomegaly  Extremities: extremities normal, atraumatic, no cyanosis or edema        Data Review   Recent Results (from the past 24 hour(s))   GLUCOSE, POC    Collection Time: 11/20/19  9:42 AM   Result Value Ref Range    Glucose (POC) 205 (H) 65 - 100 mg/dL    Performed by Keyur Devine, POC    Collection Time: 11/20/19 10:39 AM   Result Value Ref Range    Glucose (POC) 216 (H) 65 - 100 mg/dL    Performed by Keyur Devine, POC    Collection Time: 11/20/19 11:43 AM   Result Value Ref Range    Glucose (POC) 220 (H) 65 - 100 mg/dL    Performed by [address] [place] [address], POC    Collection Time: 11/20/19  5:28 PM   Result Value Ref Range    Glucose (POC) 250 (H) 65 - 100 mg/dL    Performed by Keyur Devine, POC    Collection Time: 11/20/19  9:42 PM   Result Value Ref Range    Glucose (POC) 240 (H) 65 - 100 mg/dL    Performed by Bindu Joyce (CON) PCT    METABOLIC PANEL, BASIC    Collection Time: 11/21/19  3:18 AM   Result Value Ref Range    Sodium 147 (H) 136 - 145 mmol/L    Potassium 3.2 (L) 3.5 - 5.1 mmol/L    Chloride 113 (H) 97 - 108 mmol/L    CO2 28 21 - 32 mmol/L    Anion gap 6 5 - 15 mmol/L    Glucose 187 (H) 65 - 100 mg/dL    BUN 20 6 - 20 MG/DL    Creatinine 0.82 0.55 - 1.02 MG/DL    BUN/Creatinine ratio 24 (H) 12 - 20      GFR est AA >60 >60 ml/min/1.73m2    GFR est non-AA >60 >60 ml/min/1.73m2    Calcium 7.2 (L) 8.5 - 10.1 MG/DL   NT-PRO BNP    Collection Time: 11/21/19  3:18 AM   Result Value Ref Range    NT pro- (H) <450 PG/ML   PHOSPHORUS    Collection Time: 11/21/19  3:18 AM   Result Value Ref Range    Phosphorus 2.6 2.6 - 4.7 MG/DL   GLUCOSE, POC    Collection Time: 11/21/19  7:47 AM   Result Value Ref Range    Glucose (POC) 222 (H) 65 - 100 mg/dL    Performed by Shwetha Martiin RN            Assessment:     Active Problems:    DKA (diabetic ketoacidoses) ([facility] (6/2/2017)      ONEYDA (acute kidney injury) ([facility] (11/19/2019)      Acute metabolic encephalopathy (94/43/4998)        Plan:     1. DKA resolved. Continue diabetic control. Timeliness of eating to minimize the probability of hypoglycemia. 2.  Continue hydration. 3.  Will mobilize.

## 2019-11-22 DIAGNOSIS — E03.2 HYPOTHYROIDISM DUE TO MEDICATION: ICD-10-CM

## 2019-11-22 LAB
ANION GAP SERPL CALC-SCNC: 3 MMOL/L (ref 5–15)
BASOPHILS # BLD: 0 K/UL (ref 0–0.1)
BASOPHILS NFR BLD: 0 % (ref 0–1)
BLASTS NFR BLD MANUAL: 0 %
BUN SERPL-MCNC: 14 MG/DL (ref 6–20)
BUN/CREAT SERPL: 19 (ref 12–20)
CALCIUM SERPL-MCNC: 7.3 MG/DL (ref 8.5–10.1)
CHLORIDE SERPL-SCNC: 108 MMOL/L (ref 97–108)
CO2 SERPL-SCNC: 30 MMOL/L (ref 21–32)
CREAT SERPL-MCNC: 0.73 MG/DL (ref 0.55–1.02)
DIFFERENTIAL METHOD BLD: ABNORMAL
EOSINOPHIL # BLD: 0.1 K/UL (ref 0–0.4)
EOSINOPHIL NFR BLD: 1 % (ref 0–7)
ERYTHROCYTE [DISTWIDTH] IN BLOOD BY AUTOMATED COUNT: 14.7 % (ref 11.5–14.5)
GLUCOSE BLD STRIP.AUTO-MCNC: 144 MG/DL (ref 65–100)
GLUCOSE BLD STRIP.AUTO-MCNC: 179 MG/DL (ref 65–100)
GLUCOSE BLD STRIP.AUTO-MCNC: 184 MG/DL (ref 65–100)
GLUCOSE BLD STRIP.AUTO-MCNC: 419 MG/DL (ref 65–100)
GLUCOSE BLD STRIP.AUTO-MCNC: 63 MG/DL (ref 65–100)
GLUCOSE BLD STRIP.AUTO-MCNC: 73 MG/DL (ref 65–100)
GLUCOSE SERPL-MCNC: 126 MG/DL (ref 65–100)
HCT VFR BLD AUTO: 37.8 % (ref 35–47)
HGB BLD-MCNC: 12.5 G/DL (ref 11.5–16)
IMM GRANULOCYTES # BLD AUTO: 0 K/UL
IMM GRANULOCYTES NFR BLD AUTO: 0 %
LYMPHOCYTES # BLD: 1 K/UL (ref 0.8–3.5)
LYMPHOCYTES NFR BLD: 16 % (ref 12–49)
MCH RBC QN AUTO: 31.4 PG (ref 26–34)
MCHC RBC AUTO-ENTMCNC: 33.1 G/DL (ref 30–36.5)
MCV RBC AUTO: 95 FL (ref 80–99)
METAMYELOCYTES NFR BLD MANUAL: 0 %
MONOCYTES # BLD: 0.3 K/UL (ref 0–1)
MONOCYTES NFR BLD: 5 % (ref 5–13)
MYELOCYTES NFR BLD MANUAL: 0 %
NEUTS BAND NFR BLD MANUAL: 1 % (ref 0–6)
NEUTS SEG # BLD: 5 K/UL (ref 1.8–8)
NEUTS SEG NFR BLD: 77 % (ref 32–75)
NRBC # BLD: 0.04 K/UL (ref 0–0.01)
NRBC BLD-RTO: 0.6 PER 100 WBC
OTHER CELLS NFR BLD MANUAL: 0 %
PLATELET # BLD AUTO: 157 K/UL (ref 150–400)
PMV BLD AUTO: 10.5 FL (ref 8.9–12.9)
POTASSIUM SERPL-SCNC: 4.2 MMOL/L (ref 3.5–5.1)
PROMYELOCYTES NFR BLD MANUAL: 0 %
RBC # BLD AUTO: 3.98 M/UL (ref 3.8–5.2)
RBC MORPH BLD: ABNORMAL
SERVICE CMNT-IMP: ABNORMAL
SERVICE CMNT-IMP: NORMAL
SODIUM SERPL-SCNC: 141 MMOL/L (ref 136–145)
WBC # BLD AUTO: 6.4 K/UL (ref 3.6–11)
WBC MORPH BLD: ABNORMAL

## 2019-11-22 PROCEDURE — 74011250636 HC RX REV CODE- 250/636: Performed by: INTERNAL MEDICINE

## 2019-11-22 PROCEDURE — 74011250637 HC RX REV CODE- 250/637: Performed by: INTERNAL MEDICINE

## 2019-11-22 PROCEDURE — 80048 BASIC METABOLIC PNL TOTAL CA: CPT

## 2019-11-22 PROCEDURE — 36415 COLL VENOUS BLD VENIPUNCTURE: CPT

## 2019-11-22 PROCEDURE — 74011636637 HC RX REV CODE- 636/637: Performed by: INTERNAL MEDICINE

## 2019-11-22 PROCEDURE — 65660000000 HC RM CCU STEPDOWN

## 2019-11-22 PROCEDURE — 94760 N-INVAS EAR/PLS OXIMETRY 1: CPT

## 2019-11-22 PROCEDURE — 82962 GLUCOSE BLOOD TEST: CPT

## 2019-11-22 PROCEDURE — 85027 COMPLETE CBC AUTOMATED: CPT

## 2019-11-22 PROCEDURE — 74011000258 HC RX REV CODE- 258: Performed by: INTERNAL MEDICINE

## 2019-11-22 PROCEDURE — 97116 GAIT TRAINING THERAPY: CPT

## 2019-11-22 RX ORDER — INSULIN GLARGINE 100 [IU]/ML
16 INJECTION, SOLUTION SUBCUTANEOUS DAILY
Status: DISCONTINUED | OUTPATIENT
Start: 2019-11-22 | End: 2019-11-24 | Stop reason: HOSPADM

## 2019-11-22 RX ORDER — INSULIN LISPRO 100 [IU]/ML
4 INJECTION, SOLUTION INTRAVENOUS; SUBCUTANEOUS
Status: DISCONTINUED | OUTPATIENT
Start: 2019-11-22 | End: 2019-11-24 | Stop reason: HOSPADM

## 2019-11-22 RX ORDER — LEVOTHYROXINE SODIUM 175 UG/1
TABLET ORAL
Qty: 90 TAB | Refills: 3 | Status: SHIPPED | OUTPATIENT
Start: 2019-11-22 | End: 2020-02-18 | Stop reason: SDUPTHER

## 2019-11-22 RX ADMIN — INSULIN LISPRO 4 UNITS: 100 INJECTION, SOLUTION INTRAVENOUS; SUBCUTANEOUS at 11:32

## 2019-11-22 RX ADMIN — Medication 10 ML: at 17:24

## 2019-11-22 RX ADMIN — POTASSIUM CHLORIDE: 2 INJECTION, SOLUTION, CONCENTRATE INTRAVENOUS at 00:22

## 2019-11-22 RX ADMIN — INSULIN LISPRO 4 UNITS: 100 INJECTION, SOLUTION INTRAVENOUS; SUBCUTANEOUS at 17:24

## 2019-11-22 RX ADMIN — BRIMONIDINE TARTRATE 1 DROP: 2 SOLUTION OPHTHALMIC at 17:24

## 2019-11-22 RX ADMIN — BRIMONIDINE TARTRATE 1 DROP: 2 SOLUTION OPHTHALMIC at 09:36

## 2019-11-22 RX ADMIN — Medication 10 ML: at 05:47

## 2019-11-22 RX ADMIN — INSULIN GLARGINE 16 UNITS: 100 INJECTION, SOLUTION SUBCUTANEOUS at 09:36

## 2019-11-22 RX ADMIN — ENOXAPARIN SODIUM 40 MG: 40 INJECTION SUBCUTANEOUS at 11:32

## 2019-11-22 RX ADMIN — ASPIRIN 325 MG: 325 TABLET, FILM COATED ORAL at 09:35

## 2019-11-22 RX ADMIN — LEVOTHYROXINE SODIUM 175 MCG: 125 TABLET ORAL at 09:35

## 2019-11-22 NOTE — ROUTINE PROCESS
Bedside shift change report given to [address] [place] [address] (oncoming nurse) by Shonna Galvin (offgoing nurse). Report included the following information [facility]. 
  
[facility] Phone:   [phone] 
  
  
Significant changes during shift: . none  
  
Patient Information 
  
Smiley Stephenson 
69 y.o. 
11/19/2019  5:26 AM by Shade Solorzano MD. Smiley Stephenson was admitted from Home 
  
Problem List 
  
     
Patient Active Problem List  
  Diagnosis Date Noted  Vascular dementia without behavioral disturbance ([address] 10/20/2018  
    Priority: 3 - Three  ONEYDA (acute kidney injury) ([address] 11/19/2019  Acute metabolic encephalopathy 20/24/8110  Hypoglycemic unawareness associated with type 1 diabetes mellitus ([address] 11/05/2019  Acute encephalopathy 06/10/2019  H/O: CVA (cerebrovascular accident) 11/06/2018  Hyperglycemia due to type 1 diabetes mellitus ([address] 10/15/2018  Hypoglycemia 08/27/2018  DKA (diabetic ketoacidoses) ([address] 06/02/2017  Dyslipidemia 02/06/2015  Hypertension 02/06/2015  Hypothyroidism 02/06/2015  Memory deficit 02/06/2015  
  
    
Past Medical History:  
Diagnosis Date  Diabetes ([address]    
 Heart failure ([address]    
  unknown to family  Hypercholesteremia    
 Hypertension    
 Stroke ([address]    
 Thyroid disease    
  
  
  
Core Measures: 
  
CVA: No No 
CHF:No No 
PNA:No No 
  
  
  
Activity Status: 
  
OOB to Chair Yes Ambulated this shift Yes Bed Rest No 
  
  
DVT prophylaxis: 
  
DVT prophylaxis Med- Yes DVT prophylaxis SCD or CONCHITA- No  
  
Wounds: (If Applicable) 
  
Wounds- No 
  
Patient Safety: 
  
Falls Score Total Score: 4 Safety Level_______ Bed Alarm On? Yes Sitter? No 
  
Plan for upcoming shift:  Blood glucose control, safety 
  
  
  
Discharge Plan: Yes, possibly SNF when ready 
  
Active Consults: 
None

## 2019-11-22 NOTE — PROGRESS NOTES
Problem: Mobility Impaired (Adult and Pediatric)  Goal: *Acute Goals and Plan of Care (Insert Text)  Description  FUNCTIONAL STATUS PRIOR TO ADMISSION: Patient was independent and active without use of DME. Reports history of 1 fall over previous 6 months. States she is still driving. Does all cooking, cleaning, household chores. HOME SUPPORT PRIOR TO ADMISSION: The patient lived with 2 sons. Pt states one son is \"special needs\" and home with her during the day, other son works full-time and is only home at night time    Physical Therapy Goals  Initiated 11/21/2019  1. Patient will move from supine to sit and sit to supine , scoot up and down and roll side to side in bed with independence within 7 day(s). 2.  Patient will transfer from bed to chair and chair to bed with supervision/set-up using the least restrictive device within 7 day(s). 3.  Patient will perform sit to stand with supervision/set-up within 7 day(s). 4.  Patient will ambulate with supervision/set-up for 200 feet with the least restrictive device within 7 day(s). 5.  Patient will ascend/descend 12 stairs with 1 handrail(s) with supervision/set-up within 7 day(s). Outcome: Progressing Towards Goal   PHYSICAL THERAPY TREATMENT  Patient: Sebastien Gutierrez (69 y.o. female)  Date: 11/22/2019  Diagnosis: DKA (diabetic ketoacidoses) [facility] [B09.85]  Acute metabolic encephalopathy [A73.82]  ONEYDA (acute kidney injury) ([facility] [N17.9]   <principal problem not specified>       Precautions:    Chart, physical therapy assessment, plan of care and goals were reviewed. ASSESSMENT  Patient continues with skilled PT services and is making excellent progress towards goals. Pt with improved activity tolerance this date, demonstrating ability to progress ambulation trial to a distance of 220ft w/ SBAx1 and no device. Pt with slow, shuffled gait and minor path deviations however no overt LOB/balance deficits noted.  Pt tolerated therapy session well with VSS throughout. Minor deficits in short-term memory noted w/ min verbal cueing required. Current Level of Function Impacting Discharge (mobility/balance): mod I for sit<>stand transfers, SBA during ambulation w/o device    Other factors to consider for discharge: intermittent confusion, noncompliance w/ diabetes mgmt         PLAN :  Patient continues to benefit from skilled intervention to address the above impairments. Continue treatment per established plan of care. to address goals. Recommendation for discharge: (in order for the patient to meet his/her long term goals)  Physical therapy at least 2 days/week in the home AND ensure assist and/or supervision for safety with functional mobility and ADLs     This discharge recommendation:  Has been made in collaboration with the attending provider and/or case management    IF patient discharges home will need the following DME: none       SUBJECTIVE:   Patient stated No Espinoza that was a work out! [place]    OBJECTIVE DATA SUMMARY:   Critical Behavior:  Neurologic State: Alert  Orientation Level: Oriented X4  Cognition: Appropriate safety awareness, Appropriate for age attention/concentration     Functional Mobility Training:  Bed Mobility:                    Transfers:  Sit to Stand: Modified independent  Stand to Sit: Modified independent                             Balance:  Sitting: Intact  Standing: Impaired  Standing - Static: Good  Standing - Dynamic : Fair  Ambulation/Gait Training:  Distance (ft): 220 Feet (ft)  Assistive Device: Gait belt  Ambulation - Level of Assistance: Stand-by assistance        Gait Abnormalities: Decreased step clearance;Shuffling gait              Speed/Deepika: Pace decreased (<100 feet/min)  Step Length: Left shortened;Right shortened                          Therapeutic Exercises:   1x 10 sit<>stand  2x 10 standing marches w/ SB/CGA x1  2x 10 LAQ    Pain Rating:  Denied complaints of pain    Activity Tolerance:   WNL and Good  Please refer to the flowsheet for vital signs taken during this treatment.     After treatment patient left in no apparent distress:   Sitting in chair, Call bell within reach, and Bed / chair alarm activated    COMMUNICATION/COLLABORATION:   The patients plan of care was discussed with: Registered Nurse    Nela Murry, PT, DPT   Time Calculation: 15 mins

## 2019-11-22 NOTE — PROGRESS NOTES
Bedside shift change report given to Avila Marte (oncoming nurse) by Gwen Vinson RN (offgoing nurse). Report included the following information [facility] Phone:   [phone]      Significant changes during shift:  Received coverage for blood glucose for breakfast and dinner, not needed at dinner time. Patient c/o missing dentures, daughter states dentures were present on admission, patient transferred from CCU on 11/20, Nurse leader and [address] [place] [address] Street notified, follow up with CCU pending per CCU nurse will look into situation.          Patient Information    Sebastien Gutierrez  69 y.o.  11/19/2019  5:26 AM by Tay Nova MD. Sebastien Gutierrez was admitted from Home    Problem List    Patient Active Problem List    Diagnosis Date Noted    Vascular dementia without behavioral disturbance ([address] 10/20/2018     Priority: 3 - Three    ONEYDA (acute kidney injury) ([address] 11/19/2019    Acute metabolic encephalopathy 13/25/5427    Hypoglycemic unawareness associated with type 1 diabetes mellitus ([address] 11/05/2019    Acute encephalopathy 06/10/2019    H/O: CVA (cerebrovascular accident) 11/06/2018    Hyperglycemia due to type 1 diabetes mellitus ([address] 10/15/2018    Hypoglycemia 08/27/2018    DKA (diabetic ketoacidoses) ([address] 06/02/2017    Dyslipidemia 02/06/2015    Hypertension 02/06/2015    Hypothyroidism 02/06/2015    Memory deficit 02/06/2015     Past Medical History:   Diagnosis Date    Diabetes ([address]     Heart failure ([address]     unknown to family    Hypercholesteremia     Hypertension     Stroke ([address]     Thyroid disease          Core Measures:    CVA: No No  CHF:No No  PNA:No No        Activity Status:    OOB to Chair Yes  Ambulated this shift Yes   Bed Rest No    Supplemental O2: (If Applicable)    NC No  NRB No  Venti-mask No  On 0 Liters/min      LINES AND DRAINS:    PIV: 20 R forearm, 22 L hand, 22 L forearm    DVT prophylaxis:    DVT prophylaxis Med- Yes  DVT prophylaxis SCD or CONCHITA- No     Wounds: (If Applicable)    Wounds- No    Location n/a    Patient Safety:    Falls Score Total Score: 4  Safety Level_______  Bed Alarm On? Yes  Sitter?  No    Plan for upcoming shift:  Blood glucose control, safety         Discharge Plan: No TBD    Active Consults:  None

## 2019-11-22 NOTE — PROGRESS NOTES
Bedside shift change report given to [address] [place] [address] (oncoming nurse) by Jayden Orellana (offgoing nurse). Report included the following information [facility]. [facility] Phone:   [phone]      Significant changes during shift: . IV fluids continues. Blood sugar controlled. Patient Information    Matteo Hays  69 y.o.  11/19/2019  5:26 AM by Nicolle Finch MD. Matteo Hays was admitted from Home    Problem List    Patient Active Problem List    Diagnosis Date Noted    Vascular dementia without behavioral disturbance ([address] 10/20/2018     Priority: 3 - Three    ONEYDA (acute kidney injury) ([address] 11/19/2019    Acute metabolic encephalopathy 02/33/7207    Hypoglycemic unawareness associated with type 1 diabetes mellitus ([address] 11/05/2019    Acute encephalopathy 06/10/2019    H/O: CVA (cerebrovascular accident) 11/06/2018    Hyperglycemia due to type 1 diabetes mellitus ([address] 10/15/2018    Hypoglycemia 08/27/2018    DKA (diabetic ketoacidoses) ([address] 06/02/2017    Dyslipidemia 02/06/2015    Hypertension 02/06/2015    Hypothyroidism 02/06/2015    Memory deficit 02/06/2015     Past Medical History:   Diagnosis Date    Diabetes ([address]     Heart failure ([address]     unknown to family    Hypercholesteremia     Hypertension     Stroke ([address]     Thyroid disease          Core Measures:    CVA: No No  CHF:No No  PNA:No No        Activity Status:    OOB to Chair Yes  Ambulated this shift Yes   Bed Rest No    Supplemental O2: (If Applicable)    NC No  NRB No  Venti-mask No  On 0 Liters/min      LINES AND DRAINS:    PIV: 20 R forearm, 22 L hand, 22 L forearm    DVT prophylaxis:    DVT prophylaxis Med- Yes  DVT prophylaxis SCD or CONCHITA- No     Wounds: (If Applicable)    Wounds- No    Location n/a    Patient Safety:    Falls Score Total Score: 4  Safety Level_______  Bed Alarm On? Yes  Sitter?  No    Plan for upcoming shift:  Blood glucose control, safety         Discharge Plan: No TBD    Active Consults:  None

## 2019-11-22 NOTE — PROGRESS NOTES
General Daily Progress Note    Admit Date: 11/19/2019    Subjective:     Patient has no complaint . Current Facility-Administered Medications   Medication Dose Route Frequency    insulin glargine (LANTUS) injection 16 Units  16 Units SubCUTAneous DAILY    insulin lispro (HUMALOG) injection 4 Units  4 Units SubCUTAneous TIDAC    glucose chewable tablet 16 g  4 Tab Oral PRN    dextrose (D50W) injection syrg 12.5-25 g  12.5-25 g IntraVENous PRN    glucagon (GLUCAGEN) injection 1 mg  1 mg IntraMUSCular PRN    aspirin tablet 325 mg  325 mg Oral DAILY    levothyroxine (SYNTHROID) tablet 175 mcg  175 mcg Oral ACB    enoxaparin (LOVENOX) injection 40 mg  40 mg SubCUTAneous Q24H    sodium chloride (NS) flush 5-40 mL  5-40 mL IntraVENous Q8H    sodium chloride (NS) flush 5-40 mL  5-40 mL IntraVENous PRN    acetaminophen (TYLENOL) suppository 650 mg  650 mg Rectal Q6H PRN    ondansetron (ZOFRAN) injection 4 mg  4 mg IntraVENous Q6H PRN    brimonidine (ALPHAGAN) 0.2 % ophthalmic solution 1 Drop  1 Drop Both Eyes BID        Review of Systems  A comprehensive review of systems was negative. Objective:     Patient Vitals for the past 24 hrs:   BP Temp Pulse Resp SpO2   11/22/19 0742 144/57 98.2 °F (36.8 °C) 78 16 98 %   11/22/19 0507 139/66 98.1 °F (36.7 °C) 82 18 99 %   11/21/19 2330 142/69 97.9 °F (36.6 °C) 79 18 100 %   11/21/19 2047 130/65 98.1 °F (36.7 °C) 84 18 100 %   11/21/19 1526 132/74 98 °F (36.7 °C) 92 18 98 %   11/21/19 1144 132/66 97.8 °F (36.6 °C) 88 18 99 %     No intake/output data recorded. 11/20 1901 - 11/22 0700  In: 1500 [P.O.:400;  I.V.:1100]  Out: 400 [Urine:400]    Physical Exam:   Visit Vitals  /57   Pulse 78   Temp 98.2 °F (36.8 °C)   Resp 16   Ht 5' 5\" (1.651 m)   Wt 163 lb (73.9 kg)   SpO2 98%   BMI 27.12 kg/m²     General appearance: alert, cooperative, no distress, appears stated age  Neck: supple, symmetrical, trachea midline, no adenopathy, thyroid: not enlarged, symmetric, no tenderness/mass/nodules, no carotid bruit and no JVD  Lungs: clear to auscultation bilaterally  Heart: regular rate and rhythm, S1, S2 normal, no murmur, click, rub or gallop  Abdomen: soft, non-tender.  Bowel sounds normal. No masses,  no organomegaly  Extremities: edema 1+distally        Data Review   Recent Results (from the past 24 hour(s))   GLUCOSE, POC    Collection Time: 11/21/19 11:10 AM   Result Value Ref Range    Glucose (POC) 229 (H) 65 - 100 mg/dL    Performed by Kelsi Delaney (PCT)    GLUCOSE, POC    Collection Time: 11/21/19  4:49 PM   Result Value Ref Range    Glucose (POC) 137 (H) 65 - 100 mg/dL    Performed by Kelsi Delaney (PCT)    GLUCOSE, POC    Collection Time: 11/21/19  9:41 PM   Result Value Ref Range    Glucose (POC) 165 (H) 65 - 100 mg/dL    Performed by Jade Solis (PCT)    METABOLIC PANEL, BASIC    Collection Time: 11/22/19 12:23 AM   Result Value Ref Range    Sodium 141 136 - 145 mmol/L    Potassium 4.2 3.5 - 5.1 mmol/L    Chloride 108 97 - 108 mmol/L    CO2 30 21 - 32 mmol/L    Anion gap 3 (L) 5 - 15 mmol/L    Glucose 126 (H) 65 - 100 mg/dL    BUN 14 6 - 20 MG/DL    Creatinine 0.73 0.55 - 1.02 MG/DL    BUN/Creatinine ratio 19 12 - 20      GFR est AA >60 >60 ml/min/1.73m2    GFR est non-AA >60 >60 ml/min/1.73m2    Calcium 7.3 (L) 8.5 - 10.1 MG/DL   CBC WITH MANUAL DIFF    Collection Time: 11/22/19 12:23 AM   Result Value Ref Range    WBC 6.4 3.6 - 11.0 K/uL    RBC 3.98 3.80 - 5.20 M/uL    HGB 12.5 11.5 - 16.0 g/dL    HCT 37.8 35.0 - 47.0 %    MCV 95.0 80.0 - 99.0 FL    MCH 31.4 26.0 - 34.0 PG    MCHC 33.1 30.0 - 36.5 g/dL    RDW 14.7 (H) 11.5 - 14.5 %    PLATELET 191 921 - 231 K/uL    MPV 10.5 8.9 - 12.9 FL    NRBC 0.6 (H) 0  WBC    ABSOLUTE NRBC 0.04 (H) 0.00 - 0.01 K/uL    NEUTROPHILS 77 (H) 32 - 75 %    BAND NEUTROPHILS 1 0 - 6 %    LYMPHOCYTES 16 12 - 49 %    MONOCYTES 5 5 - 13 %    EOSINOPHILS 1 0 - 7 %    BASOPHILS 0 0 - 1 %    METAMYELOCYTES 0 0 %    MYELOCYTES 0 0 % PROMYELOCYTES 0 0 %    BLASTS 0 0 %    OTHER CELL 0 0      IMMATURE GRANULOCYTES 0 %    ABS. NEUTROPHILS 5.0 1.8 - 8.0 K/UL    ABS. LYMPHOCYTES 1.0 0.8 - 3.5 K/UL    ABS. MONOCYTES 0.3 0.0 - 1.0 K/UL    ABS. EOSINOPHILS 0.1 0.0 - 0.4 K/UL    ABS. BASOPHILS 0.0 0.0 - 0.1 K/UL    ABS. IMM. GRANS. 0.0 K/UL    DF MANUAL      RBC COMMENTS NORMOCYTIC, NORMOCHROMIC      WBC COMMENTS VACUOLATED POLYS     GLUCOSE, POC    Collection Time: 11/22/19  6:20 AM   Result Value Ref Range    Glucose (POC) 63 (L) 65 - 100 mg/dL    Performed by Deveron Chamber (PCT)    GLUCOSE, POC    Collection Time: 11/22/19  6:44 AM   Result Value Ref Range    Glucose (POC) 73 65 - 100 mg/dL    Performed by Deveron Chamber (PCT)    GLUCOSE, POC    Collection Time: 11/22/19  7:18 AM   Result Value Ref Range    Glucose (POC) 144 (H) 65 - 100 mg/dL    Performed by Breana Blackmon PCT            Assessment:     Active Problems:    DKA (diabetic ketoacidoses) ([facility] (6/2/2017)      ONEYDA (acute kidney injury) ([facility] (11/19/2019)      Acute metabolic encephalopathy (36/22/2615)        Plan:     1. Continue diabetic control. Too much basal and will augment prandial insulin. 2.  Patient needs CGM. 3.  Hydration adequate. 4.  Will mobilize.

## 2019-11-22 NOTE — PROGRESS NOTES
Rounded on patient due to recent transfer out of CCU on 11/20/19. Spoke with primary nurse, vital signs stable, no concerns at this time.

## 2019-11-23 LAB
ANION GAP SERPL CALC-SCNC: 3 MMOL/L (ref 5–15)
BUN SERPL-MCNC: 9 MG/DL (ref 6–20)
BUN/CREAT SERPL: 13 (ref 12–20)
CALCIUM SERPL-MCNC: 7.5 MG/DL (ref 8.5–10.1)
CHLORIDE SERPL-SCNC: 107 MMOL/L (ref 97–108)
CO2 SERPL-SCNC: 29 MMOL/L (ref 21–32)
CREAT SERPL-MCNC: 0.69 MG/DL (ref 0.55–1.02)
GLUCOSE BLD STRIP.AUTO-MCNC: 142 MG/DL (ref 65–100)
GLUCOSE BLD STRIP.AUTO-MCNC: 158 MG/DL (ref 65–100)
GLUCOSE BLD STRIP.AUTO-MCNC: 172 MG/DL (ref 65–100)
GLUCOSE BLD STRIP.AUTO-MCNC: 85 MG/DL (ref 65–100)
GLUCOSE SERPL-MCNC: 141 MG/DL (ref 65–100)
POTASSIUM SERPL-SCNC: 4.1 MMOL/L (ref 3.5–5.1)
SERVICE CMNT-IMP: ABNORMAL
SERVICE CMNT-IMP: NORMAL
SODIUM SERPL-SCNC: 139 MMOL/L (ref 136–145)

## 2019-11-23 PROCEDURE — 74011250636 HC RX REV CODE- 250/636: Performed by: INTERNAL MEDICINE

## 2019-11-23 PROCEDURE — 74011250637 HC RX REV CODE- 250/637: Performed by: INTERNAL MEDICINE

## 2019-11-23 PROCEDURE — 65660000000 HC RM CCU STEPDOWN

## 2019-11-23 PROCEDURE — 94760 N-INVAS EAR/PLS OXIMETRY 1: CPT

## 2019-11-23 PROCEDURE — 82962 GLUCOSE BLOOD TEST: CPT

## 2019-11-23 PROCEDURE — 36415 COLL VENOUS BLD VENIPUNCTURE: CPT

## 2019-11-23 PROCEDURE — 74011636637 HC RX REV CODE- 636/637: Performed by: INTERNAL MEDICINE

## 2019-11-23 PROCEDURE — 80048 BASIC METABOLIC PNL TOTAL CA: CPT

## 2019-11-23 RX ORDER — POTASSIUM CHLORIDE 750 MG/1
20 TABLET, FILM COATED, EXTENDED RELEASE ORAL
Status: COMPLETED | OUTPATIENT
Start: 2019-11-23 | End: 2019-11-23

## 2019-11-23 RX ORDER — FUROSEMIDE 40 MG/1
40 TABLET ORAL
Status: COMPLETED | OUTPATIENT
Start: 2019-11-23 | End: 2019-11-23

## 2019-11-23 RX ADMIN — FUROSEMIDE 40 MG: 40 TABLET ORAL at 11:59

## 2019-11-23 RX ADMIN — INSULIN LISPRO 4 UNITS: 100 INJECTION, SOLUTION INTRAVENOUS; SUBCUTANEOUS at 08:42

## 2019-11-23 RX ADMIN — POTASSIUM CHLORIDE 20 MEQ: 750 TABLET, FILM COATED, EXTENDED RELEASE ORAL at 11:59

## 2019-11-23 RX ADMIN — BRIMONIDINE TARTRATE 1 DROP: 2 SOLUTION OPHTHALMIC at 17:33

## 2019-11-23 RX ADMIN — BRIMONIDINE TARTRATE 1 DROP: 2 SOLUTION OPHTHALMIC at 08:42

## 2019-11-23 RX ADMIN — INSULIN GLARGINE 16 UNITS: 100 INJECTION, SOLUTION SUBCUTANEOUS at 08:42

## 2019-11-23 RX ADMIN — LEVOTHYROXINE SODIUM 175 MCG: 125 TABLET ORAL at 08:42

## 2019-11-23 RX ADMIN — INSULIN LISPRO 4 UNITS: 100 INJECTION, SOLUTION INTRAVENOUS; SUBCUTANEOUS at 19:29

## 2019-11-23 RX ADMIN — Medication 10 ML: at 14:58

## 2019-11-23 RX ADMIN — ASPIRIN 325 MG: 325 TABLET, FILM COATED ORAL at 08:41

## 2019-11-23 RX ADMIN — Medication 10 ML: at 22:00

## 2019-11-23 RX ADMIN — Medication 10 ML: at 05:48

## 2019-11-23 RX ADMIN — ENOXAPARIN SODIUM 40 MG: 40 INJECTION SUBCUTANEOUS at 11:58

## 2019-11-23 NOTE — PROGRESS NOTES
Problem: Falls - Risk of  Goal: *Absence of Falls  Description  Document Andres Host Fall Risk and appropriate interventions in the flowsheet.   Outcome: Progressing Towards Goal  Note: Fall Risk Interventions:  Mobility Interventions: Bed/chair exit alarm, Patient to call before getting OOB    Mentation Interventions: Adequate sleep, hydration, pain control, Bed/chair exit alarm, More frequent rounding    Medication Interventions: Bed/chair exit alarm, Patient to call before getting OOB    Elimination Interventions: Call light in reach, Toileting schedule/hourly rounds

## 2019-11-23 NOTE — PROGRESS NOTES
Pt ambulating safely in room with R/W. Uses call bell to notify staff that she needs assistance.  Bed at low level, call bell within reach

## 2019-11-23 NOTE — PROGRESS NOTES
CM spoke with the pt's daughter regarding her preferences for SNF placement for the patient. The pt's daughter stated that she had been talking with the patient about this but she was in a class and was unable to discuss further. She stated that she would contact CM later in the day regarding the dispo plan for the patient. CM will continue to assist with dispo planning.      Barrett Aguero [address], 18 [facility]

## 2019-11-23 NOTE — ROUTINE PROCESS
Bedside shift change report given to Barrett Avila [address] (oncoming nurse) by Alina Torres RN (offgoing nurse). Report included the following information [facility] Phone:   [phone]  
  
Significant changes during shift: . none  
  
Patient Information 
  
Elvie Moffett 
69 y.o. 
11/19/2019  5:26 AM by Radha Ramos MD. Elvie Moffett was admitted from Home 
  
Problem List 
  
     
Patient Active Problem List  
  Diagnosis Date Noted  Vascular dementia without behavioral disturbance ([address] 10/20/2018  
    Priority: 3 - Three  ONEYDA (acute kidney injury) ([address] 11/19/2019  Acute metabolic encephalopathy 10/00/0860  Hypoglycemic unawareness associated with type 1 diabetes mellitus ([address] 11/05/2019  Acute encephalopathy 06/10/2019  H/O: CVA (cerebrovascular accident) 11/06/2018  Hyperglycemia due to type 1 diabetes mellitus ([address] 10/15/2018  Hypoglycemia 08/27/2018  DKA (diabetic ketoacidoses) ([address] 06/02/2017  Dyslipidemia 02/06/2015  Hypertension 02/06/2015  Hypothyroidism 02/06/2015  Memory deficit 02/06/2015  
  
    
Past Medical History:  
Diagnosis Date  Diabetes ([address]    
 Heart failure ([address]    
  unknown to family  Hypercholesteremia    
 Hypertension    
 Stroke ([address]    
 Thyroid disease    
  
  
  
Core Measures: 
  
CVA: No No 
CHF:No No 
PNA:No No 
  
  
  
Activity Status: 
  
OOB to Chair Yes Ambulated this shift Yes Bed Rest No 
  
  
DVT prophylaxis: 
  
DVT prophylaxis Med- Yes DVT prophylaxis SCD or CONCHITA- No  
  
Wounds: (If Applicable) 
  
Wounds- No 
  
Patient Safety: 
  
Falls Score Total Score: 4 Safety Level_______ Bed Alarm On? Yes Sitter? No 
  
Plan for upcoming shift:  Blood glucose control, Safety 
  
  
  
Discharge Plan: Yes, Awaiting SNF placement 
  
Active Consults: 
None

## 2019-11-23 NOTE — ROUTINE PROCESS
Bedside shift change report given to Laxmi RN (oncoming nurse) by Mary Ellen RN (offgoing nurse). Report included the following information [facility]. 
  
[facility] Phone:   [phone] 
  
  
Significant changes during shift: . none  
  
Patient Information 
  
Elvie Moffett 
69 y.o. 
11/19/2019  5:26 AM by Radha Ramos MD. Deana Chase was admitted from Home 
  
Problem List 
  
         
Patient Active Problem List  
  Diagnosis Date Noted  Vascular dementia without behavioral disturbance ([address] 10/20/2018  
    Priority: 3 - Three  ONEYDA (acute kidney injury) ([address] 11/19/2019  Acute metabolic encephalopathy 18/10/6796  Hypoglycemic unawareness associated with type 1 diabetes mellitus ([address] 11/05/2019  Acute encephalopathy 06/10/2019  H/O: CVA (cerebrovascular accident) 11/06/2018  Hyperglycemia due to type 1 diabetes mellitus ([address] 10/15/2018  Hypoglycemia 08/27/2018  DKA (diabetic ketoacidoses) ([address] 06/02/2017  Dyslipidemia 02/06/2015  Hypertension 02/06/2015  Hypothyroidism 02/06/2015  Memory deficit 02/06/2015  
  
       
Past Medical History:  
Diagnosis Date  Diabetes ([address]    
 Heart failure ([address]    
  unknown to family  Hypercholesteremia    
 Hypertension    
 Stroke ([address]    
 Thyroid disease    
  
  
  
Core Measures: 
  
CVA: No No 
CHF:No No 
PNA:No No 
  
  
  
Activity Status: 
  
OOB to Chair Yes Ambulated this shift Yes Bed Rest No 
  
  
DVT prophylaxis: 
  
DVT prophylaxis Med- Yes DVT prophylaxis SCD or CONCHITA- No  
  
Wounds: (If Applicable) 
  
Wounds- No 
  
Patient Safety: 
  
Falls Score Total Score: 4 Safety Level_______ Bed Alarm On? Yes Sitter? No 
  
Plan for upcoming shift:  Blood glucose control, safety 
  
  
  
Discharge Plan: Yes, possibly SNF when ready 
  
Active Consults: 
None

## 2019-11-23 NOTE — PROGRESS NOTES
Bedside shift change report given to Sharif RN (oncoming nurse) by Laxmi RN (offgoing nurse). Report included the following information [facility].     [facility] Phone:   [phone]        Significant changes during shift: . none      Patient Information     Elvie Moffett  69 y.o.  11/19/2019  5:26 AM by Radha Ramos MD. Deana Chase was admitted from Home     Problem List               Patient Active Problem List     Diagnosis Date Noted    Vascular dementia without behavioral disturbance ([address] 10/20/2018       Priority: 3 - Three    ONEYDA (acute kidney injury) ([address] 11/19/2019    Acute metabolic encephalopathy 23/85/1620    Hypoglycemic unawareness associated with type 1 diabetes mellitus ([address] 11/05/2019    Acute encephalopathy 06/10/2019    H/O: CVA (cerebrovascular accident) 11/06/2018    Hyperglycemia due to type 1 diabetes mellitus ([address] 10/15/2018    Hypoglycemia 08/27/2018    DKA (diabetic ketoacidoses) ([address] 06/02/2017    Dyslipidemia 02/06/2015    Hypertension 02/06/2015    Hypothyroidism 02/06/2015    Memory deficit 02/06/2015              Past Medical History:   Diagnosis Date    Diabetes ([address]      Heart failure ([address]       unknown to family    Hypercholesteremia      Hypertension      Stroke ([address]      Thyroid disease              Core Measures:     CVA: No No  CHF:No No  PNA:No No           Activity Status:     OOB to Chair Yes  Ambulated this shift Yes ,to the restroom  Bed Rest No        DVT prophylaxis:     DVT prophylaxis Med- Yes  DVT prophylaxis SCD or CONCHITA- No      Wounds: (If Applicable)     Wounds- No     Patient Safety:     Falls Score Total Score: 4  Safety Level_______  Bed Alarm On? Yes  Sitter?  No     Plan for upcoming shift:  Blood glucose control, safety           Discharge Plan: Yes, possibly SNF when ready   or home  Active Consults:  None

## 2019-11-23 NOTE — PROGRESS NOTES
General Daily Progress Note    Admit Date: 11/19/2019    Subjective:     Patient has no complaint . Current Facility-Administered Medications   Medication Dose Route Frequency    furosemide (LASIX) tablet 40 mg  40 mg Oral NOW    potassium chloride SR (KLOR-CON 10) tablet 20 mEq  20 mEq Oral NOW    insulin glargine (LANTUS) injection 16 Units  16 Units SubCUTAneous DAILY    insulin lispro (HUMALOG) injection 4 Units  4 Units SubCUTAneous TIDAC    glucose chewable tablet 16 g  4 Tab Oral PRN    dextrose (D50W) injection syrg 12.5-25 g  12.5-25 g IntraVENous PRN    glucagon (GLUCAGEN) injection 1 mg  1 mg IntraMUSCular PRN    aspirin tablet 325 mg  325 mg Oral DAILY    levothyroxine (SYNTHROID) tablet 175 mcg  175 mcg Oral ACB    enoxaparin (LOVENOX) injection 40 mg  40 mg SubCUTAneous Q24H    sodium chloride (NS) flush 5-40 mL  5-40 mL IntraVENous Q8H    sodium chloride (NS) flush 5-40 mL  5-40 mL IntraVENous PRN    acetaminophen (TYLENOL) suppository 650 mg  650 mg Rectal Q6H PRN    ondansetron (ZOFRAN) injection 4 mg  4 mg IntraVENous Q6H PRN    brimonidine (ALPHAGAN) 0.2 % ophthalmic solution 1 Drop  1 Drop Both Eyes BID        Review of Systems  A comprehensive review of systems was negative. Objective:     Patient Vitals for the past 24 hrs:   BP Temp Pulse Resp SpO2   11/23/19 0751 148/68 97.9 °F (36.6 °C) 82 16 99 %   11/23/19 0441 150/71 97.9 °F (36.6 °C) 80 18 99 %   11/23/19 0400   72     11/23/19 0002 112/82 97.5 °F (36.4 °C) 98 18 100 %   11/23/19 0000   98     11/22/19 2010 130/70 97.5 °F (36.4 °C) 88 16 100 %   11/22/19 2000   88     11/22/19 1518 155/71 97.4 °F (36.3 °C) 85 16 100 %   11/22/19 1135 129/60 97.1 °F (36.2 °C) 74 16 100 %     No intake/output data recorded.   11/21 1901 - 11/23 0700  In: 1100 [I.V.:1100]  Out: -     Physical Exam:   Visit Vitals  /68 (BP 1 Location: Right arm, BP Patient Position: At rest)   Pulse 82   Temp 97.9 °F (36.6 °C)   Resp 16 Ht 5' 5\" (1.651 m)   Wt 163 lb (73.9 kg)   SpO2 99%   BMI 27.12 kg/m²     General appearance: alert, cooperative, no distress, appears stated age  Neck: supple, symmetrical, trachea midline, no adenopathy, thyroid: not enlarged, symmetric, no tenderness/mass/nodules, no carotid bruit and no JVD  Lungs: clear to auscultation bilaterally  Heart: regular rate and rhythm, S1, S2 normal, no murmur, click, rub or gallop  Abdomen: soft, non-tender. Bowel sounds normal. No masses,  no organomegaly  Extremities: edema 2+ distally        Data Review   Recent Results (from the past 24 hour(s))   GLUCOSE, POC    Collection Time: 11/22/19 11:07 AM   Result Value Ref Range    Glucose (POC) 419 (H) 65 - 100 mg/dL    Performed by Susan Hernandez (PCT)    GLUCOSE, POC    Collection Time: 11/22/19  4:35 PM   Result Value Ref Range    Glucose (POC) 184 (H) 65 - 100 mg/dL    Performed by Susan Hernandez (PCT)    GLUCOSE, POC    Collection Time: 11/22/19  9:14 PM   Result Value Ref Range    Glucose (POC) 179 (H) 65 - 100 mg/dL    Performed by Monica Hood (PCT)    METABOLIC PANEL, BASIC    Collection Time: 11/23/19  5:20 AM   Result Value Ref Range    Sodium 139 136 - 145 mmol/L    Potassium 4.1 3.5 - 5.1 mmol/L    Chloride 107 97 - 108 mmol/L    CO2 29 21 - 32 mmol/L    Anion gap 3 (L) 5 - 15 mmol/L    Glucose 141 (H) 65 - 100 mg/dL    BUN 9 6 - 20 MG/DL    Creatinine 0.69 0.55 - 1.02 MG/DL    BUN/Creatinine ratio 13 12 - 20      GFR est AA >60 >60 ml/min/1.73m2    GFR est non-AA >60 >60 ml/min/1.73m2    Calcium 7.5 (L) 8.5 - 10.1 MG/DL   GLUCOSE, POC    Collection Time: 11/23/19  6:23 AM   Result Value Ref Range    Glucose (POC) 142 (H) 65 - 100 mg/dL    Performed by Monicamickey Hood (PCT)            Assessment:     Active Problems:    DKA (diabetic ketoacidoses) ([facility] (6/2/2017)      ONEYDA (acute kidney injury) ([facility] (11/19/2019)      Acute metabolic encephalopathy (49/03/5842)        Plan:     1. Continue diabetic control.  If all goes well we will discharge tomorrow. 2.  We will give 1 dose of Lasix in view of the edema.

## 2019-11-24 VITALS
RESPIRATION RATE: 14 BRPM | OXYGEN SATURATION: 97 % | HEIGHT: 65 IN | DIASTOLIC BLOOD PRESSURE: 57 MMHG | TEMPERATURE: 97.9 F | HEART RATE: 74 BPM | BODY MASS INDEX: 27.16 KG/M2 | SYSTOLIC BLOOD PRESSURE: 105 MMHG | WEIGHT: 163 LBS

## 2019-11-24 LAB
ANION GAP SERPL CALC-SCNC: 5 MMOL/L (ref 5–15)
BACTERIA SPEC CULT: NORMAL
BUN SERPL-MCNC: 10 MG/DL (ref 6–20)
BUN/CREAT SERPL: 13 (ref 12–20)
CALCIUM SERPL-MCNC: 7.8 MG/DL (ref 8.5–10.1)
CHLORIDE SERPL-SCNC: 106 MMOL/L (ref 97–108)
CO2 SERPL-SCNC: 28 MMOL/L (ref 21–32)
CREAT SERPL-MCNC: 0.78 MG/DL (ref 0.55–1.02)
GLUCOSE BLD STRIP.AUTO-MCNC: 108 MG/DL (ref 65–100)
GLUCOSE BLD STRIP.AUTO-MCNC: 125 MG/DL (ref 65–100)
GLUCOSE BLD STRIP.AUTO-MCNC: 71 MG/DL (ref 65–100)
GLUCOSE SERPL-MCNC: 159 MG/DL (ref 65–100)
POTASSIUM SERPL-SCNC: 4 MMOL/L (ref 3.5–5.1)
SERVICE CMNT-IMP: ABNORMAL
SERVICE CMNT-IMP: ABNORMAL
SERVICE CMNT-IMP: NORMAL
SERVICE CMNT-IMP: NORMAL
SODIUM SERPL-SCNC: 139 MMOL/L (ref 136–145)

## 2019-11-24 PROCEDURE — 82962 GLUCOSE BLOOD TEST: CPT

## 2019-11-24 PROCEDURE — 74011636637 HC RX REV CODE- 636/637: Performed by: INTERNAL MEDICINE

## 2019-11-24 PROCEDURE — 74011250637 HC RX REV CODE- 250/637: Performed by: INTERNAL MEDICINE

## 2019-11-24 PROCEDURE — 36415 COLL VENOUS BLD VENIPUNCTURE: CPT

## 2019-11-24 PROCEDURE — 80048 BASIC METABOLIC PNL TOTAL CA: CPT

## 2019-11-24 PROCEDURE — 94760 N-INVAS EAR/PLS OXIMETRY 1: CPT

## 2019-11-24 RX ORDER — FUROSEMIDE 40 MG/1
40 TABLET ORAL
Status: COMPLETED | OUTPATIENT
Start: 2019-11-24 | End: 2019-11-24

## 2019-11-24 RX ORDER — INSULIN DEGLUDEC 100 U/ML
16 INJECTION, SOLUTION SUBCUTANEOUS DAILY
Qty: 1 ADJUSTABLE DOSE PRE-FILLED PEN SYRINGE | Refills: 11 | Status: ON HOLD
Start: 2019-11-24 | End: 2019-12-01 | Stop reason: DRUGHIGH

## 2019-11-24 RX ORDER — POTASSIUM CHLORIDE 750 MG/1
20 TABLET, FILM COATED, EXTENDED RELEASE ORAL
Status: COMPLETED | OUTPATIENT
Start: 2019-11-24 | End: 2019-11-24

## 2019-11-24 RX ADMIN — INSULIN LISPRO 4 UNITS: 100 INJECTION, SOLUTION INTRAVENOUS; SUBCUTANEOUS at 08:26

## 2019-11-24 RX ADMIN — ASPIRIN 325 MG: 325 TABLET, FILM COATED ORAL at 08:26

## 2019-11-24 RX ADMIN — INSULIN GLARGINE 16 UNITS: 100 INJECTION, SOLUTION SUBCUTANEOUS at 08:26

## 2019-11-24 RX ADMIN — LEVOTHYROXINE SODIUM 175 MCG: 125 TABLET ORAL at 08:26

## 2019-11-24 RX ADMIN — POTASSIUM CHLORIDE 20 MEQ: 750 TABLET, FILM COATED, EXTENDED RELEASE ORAL at 11:57

## 2019-11-24 RX ADMIN — FUROSEMIDE 40 MG: 40 TABLET ORAL at 11:57

## 2019-11-24 RX ADMIN — BRIMONIDINE TARTRATE 1 DROP: 2 SOLUTION OPHTHALMIC at 08:26

## 2019-11-24 NOTE — DISCHARGE INSTRUCTIONS
General Discharge Instructions    Patient ID:  Arnaud Ho  [id number]  69 y.o.  1940    Patient Instructions          The following personal items were collected during your admission and were returned to you. Take Home Medications             What to do   Recommended diet: Diabetic Diet--- emphasis placed on eating at the same time every day including the at bedtime snack!!!!!!!    Recommended activity: Activity as tolerated    Follow-up with Amber Maldonado MD  in 3 days. Information obtained by :  I understand that if any problems occur once I am at home I am to contact my physician. I understand and acknowledge receipt of the instructions indicated above.                                                                                                                                            Physician's or R.N.'s Signature                                                                  Date/Time                                                                                                                                              Patient or Representative Signature                                                          Date/Time

## 2019-11-24 NOTE — PROGRESS NOTES
Discharge paper work reviewed with patient and family, both verbalize understanding of al information given. Patient discharging to home,.

## 2019-11-24 NOTE — ROUTINE PROCESS
Bedside shift change report given Gustavo Diehl RN (oncoming nurse) by Kathalene Babinski, RN (offgoing nurse). Report included the following information [facility], Kardex, MAR, Cardiac Rhythm (NSR).   
[facility] Phone:   [phone] 
  
  
Significant changes during shift: . none  
  
Patient Information 
  
Jessica Mccauley 
69 y.o. 
11/19/2019  5:26 AM by Yehuda Webb MD. Deana Chase was admitted from Home 
  
Problem List 
  
         
Patient Active Problem List  
  Diagnosis Date Noted  Vascular dementia without behavioral disturbance ([address] 10/20/2018  
    Priority: 3 - Three  ONEYDA (acute kidney injury) ([address] 11/19/2019  Acute metabolic encephalopathy 23/48/2955  Hypoglycemic unawareness associated with type 1 diabetes mellitus ([address] 11/05/2019  Acute encephalopathy 06/10/2019  H/O: CVA (cerebrovascular accident) 11/06/2018  Hyperglycemia due to type 1 diabetes mellitus ([address] 10/15/2018  Hypoglycemia 08/27/2018  DKA (diabetic ketoacidoses) ([address] 06/02/2017  Dyslipidemia 02/06/2015  Hypertension 02/06/2015  Hypothyroidism 02/06/2015  Memory deficit 02/06/2015  
  
       
Past Medical History:  
Diagnosis Date  Diabetes ([address]    
 Heart failure ([address]    
  unknown to family  Hypercholesteremia    
 Hypertension    
 Stroke ([address]    
 Thyroid disease    
  
  
  
Core Measures: 
  
CVA: No No 
CHF:No No 
PNA:No No 
  
  
  
Activity Status: 
  
OOB to Chair Yes Ambulated this shift Yes Bed Rest No 
  
  
DVT prophylaxis: 
  
DVT prophylaxis Med- Yes DVT prophylaxis SCD or CONCHITA- No  
  
Wounds: (If Applicable) 
  
Wounds- No 
  
Patient Safety: 
  
Falls Score Total Score: 4 Safety Level_______ Bed Alarm On? Yes Sitter? No 
  
Plan for upcoming shift:  Blood glucose control, safety 
  
  
  
Discharge Plan: Yes, possibly SNF when ready 
  
Active Consults: 
None

## 2019-11-24 NOTE — PROGRESS NOTES
Problem: Falls - Risk of  Goal: *Absence of Falls  Description  Document Kera Vazquez Fall Risk and appropriate interventions in the flowsheet. Outcome: Progressing Towards Goal  Note: Fall Risk Interventions:  Mobility Interventions: Bed/chair exit alarm    Mentation Interventions: Adequate sleep, hydration, pain control    Medication Interventions: Bed/chair exit alarm    Elimination Interventions: Call light in reach              Problem: Patient Education: Go to Patient Education Activity  Goal: Patient/Family Education  Outcome: Progressing Towards Goal     Problem: Pressure Injury - Risk of  Goal: *Prevention of pressure injury  Description  Document Madhu Scale and appropriate interventions in the flowsheet.   Outcome: Progressing Towards Goal  Note: Pressure Injury Interventions:  Sensory Interventions: Assess changes in LOC    Moisture Interventions: Apply protective barrier, creams and emollients, Minimize layers, Moisture barrier    Activity Interventions: Increase time out of bed    Mobility Interventions: PT/OT evaluation    Nutrition Interventions: Offer support with meals,snacks and hydration                     Problem: DKA: Discharge Outcomes  Goal: *Ambulates and performs ADL's  Outcome: Progressing Towards Goal  Goal: *Describes follow-up/return visits to physicians, diabetes treatment coordinator and other resources  Outcome: Progressing Towards Goal  Goal: *Blood glucose at patient's target range  Outcome: Progressing Towards Goal  Goal: *Acidosis resolved  Outcome: Progressing Towards Goal  Goal: *Tolerating diet  Outcome: Progressing Towards Goal  Goal: *Verbalizes understanding and describes prescribed diet  Outcome: Progressing Towards Goal  Goal: *Describes blood glucose goals, monitoring, sick day rules, hypo/hyperglycemia  Outcome: Progressing Towards Goal  Goal: *Describes available resources and support systems  Outcome: Progressing Towards Goal  Goal: *Verbalizes name, dosage, time, side effects, and number of days to continue medications  Outcome: Progressing Towards Goal  Goal: *Demonstrates ability to self-administer insulin  Outcome: Progressing Towards Goal

## 2019-11-25 ENCOUNTER — PATIENT OUTREACH (OUTPATIENT)
Dept: INTERNAL MEDICINE CLINIC | Age: 79
End: 2019-11-25

## 2019-11-25 NOTE — Clinical Note
Dr. Amanda Stark was ordered at discharge. If pt and family are agreeable, please consider HHSN and PT. Nurses may be able to assist with some diabetes education and see how pt is administering her meds and checking blood sugars face to face. Yulia,Pt has elevated A1C and is [place] on phone. Daughter was at work when I called so unable to provide info for diet and meds. Would you be able to meet with them in the office if they are willing? Lab Results     Component                Value               Date/Time                Hemoglobin A1c           13.6 (H)            10/15/2019 05:10 PM      Hemoglobin A1c           13.6 (H)            05/28/2019 05:25 PM      Hemoglobin A1c           12.2 (H)            02/12/2019 05:08 PM Thanks,Jodee Pagan RN, [facility] Transitions Nurse Team [phone]

## 2019-11-25 NOTE — PROGRESS NOTES
Hospital Discharge Follow-Up      Date/Time:  2019 11:30 AM    Patient was admitted to [facility] on 19 and discharged on 19 for DKA. The physician discharge summary was available at the time of outreach. Patient and daughter, Alisa Mccarthy were  contacted within 1 business days of discharge. Top Challenges reviewed with the provider     - Inpt therapy recommended HH- no orders placed, please consider ordering HHPT and HHSN for medication safety, disease education, and gait and strength training.    - Pt does not think she had a bedtime snack last night and reports that her blood sugar this am was \"in the 70's\", but that she ate right away and has not rechecked it yet. Shaun Vallejo PCP office to request earlier appointment for pt since Dr. Ping Christianson requested 3-5 day f/u. Pt is now scheduled for 19 at 1pm. Confirmed that pt's daughter will bring her to appt. Results for Lauren Espana (MRN [id number]) as of 2019 16:35   10/15/2019 17:10 2019 05:43 2019 13:35 2019 17:20 2019 23:23 2019 03:18   Lactic acid   6.2 (HH) 3.2 (HH) 2.2 (HH)    NT pro-BNP  591 (H)    580 (H)   Hemoglobin A1c, (calculated) 13.6 (H)            Advance Care Planning:   Does patient have an Advance Directive:  not on file- pt unsure if she has one        Method of communication with provider :chart routing, phone    Inpatient RRAT score: 23  Was this a readmission? no     Care Transition Nurse (CTN) contacted the pt and daughter, Alisa Mccarthy by telephone to perform post hospital discharge assessment. Verified name and  with patient as identifiers. Provided introduction to self, and explanation of the CTN role. Family received hospital discharge instructions. CTN reviewed discharge instructions and red flags with family who verbalized understanding. Family given an opportunity to ask questions and does not have any further questions or concerns at this time.  The family agrees to contact the PCP office for questions related to their healthcare. CTN provided contact information for future reference. Disease Specific:   N/A    Patients top risk factors for readmission:  [place], lack of knowledge about disease, level of motivation, medical condition, medication management, utilization of services    Home Health orders at discharge: none    Durable Medical Equipment ordered at discharge: none    Medication(s):   New Medications at Discharge: none  Changed Medications at Discharge: Tresiba dose reduced  Discontinued Medications at Discharge: ASA 81 mg    Medication reconciliation was not performed with patient. She states that she uses a pill box and did not have her bottles near her to review. Pt is to see PCP tomorrow and says she will review at that time. Referral to Pharm D needed: yes, for diabetes education     Current Outpatient Medications   Medication Sig    insulin degludec (TRESIBA FLEXTOUCH U-100) 100 unit/mL (3 mL) inpn 16 Units by SubCUTAneous route daily. 14 units every morning    levothyroxine (SYNTHROID) 175 mcg tablet TAKE 1 TABLET BY MOUTH EVERY DAY    amLODIPine (NORVASC) 10 mg tablet TAKE 1 TABLET BY MOUTH EVERY DAY IN THE MORNING    pravastatin (PRAVACHOL) 80 mg tablet TAKE 1 TABLET BY MOUTH EVERY DAY    losartan-hydroCHLOROthiazide (HYZAAR) 100-25 mg per tablet TAKE 1 TABLET BY MOUTH EVERY DAY    fenofibrate nanocrystallized (TRICOR) 145 mg tablet Take 145 mg by mouth daily.  clopidogrel (PLAVIX) 75 mg tab TAKE 1 TAB BY MOUTH DAILY.  metoprolol succinate (TOPROL-XL) 25 mg XL tablet Take 1 Tab by mouth daily.  insulin aspart U-100 (NOVOLOG) 100 unit/mL (3 mL) inpn 4 units AC breakfast,lunch, and dinner (Patient taking differently: by SubCUTAneous route. 4 units AC breakfast,lunch, and dinner)    brimonidine (ALPHAGAN) 0.15 % ophthalmic solution Administer 1 Drop to both eyes two (2) times a day.      No current facility-administered medications for this visit. There are no discontinued medications. [facility] follow up appointment(s):   Future Appointments   Date Time Provider Tawnya Cristina   11/26/2019  1:00 PM Harlin Habermann, MD [facility] [address]   12/10/2019  4:15 PM Harlin Habermann, MD [facility] [address]   7/8/2020  1:15 PM Josh Cutler MD [address] [facility]      Non-[facility] follow up appointment(s): none  [facility]:  pt unable to write info- will need to review with pt's sharath at next call       Goals        Post Hospitalization     Prevent Readmission        11/25/2019  - Pt will take medications same time each day and be sure to eat 3 meals and snack to prevent hypoglycemia  - PharmD referral placed to aide in further diabetes education  - Pt to f/u with Dr. Brittney Mcdonald on 11/26/19  - Was not d/c'd with Baljeet Pretty, request for PCP to consider HHPT and SN referral  MONTSE Pagan RN, [facility]  Ambulatory Navigator, [facility]

## 2019-11-25 NOTE — DISCHARGE SUMMARY
[address] SUMMARY    Name:  Jon Thompson  MR#:  [id number]  :  1940  ACCOUNT #:  [de-identified]  ADMIT DATE:  2019  DISCHARGE DATE:  2019      HISTORY OF PRESENT ILLNESS:  The patient is a 81-year-old type 1 B diabetic presented to the emergency room, because of increasing lethargy and weakness. The history was exclusively obtained from the daughter and the patient was able to assist.  She was evaluated and was found to be in diabetic ketoacidosis. This is unfortunately a recurring problem for the patient exclusively due to her noncompliance. Unfortunately, the assistance by the family has been somewhat limited. In any event, she is admitted for DKA. Past medical history, social history, review of systems, family history, and physical examination is as in admitting H and P.    LABORATORY VALUES:  Abnormalities in the comprehensive profile on admission reveals CO2 of less than 5, BUN and creatinine of 43 and 2.15 with an alkaline phosphatase of 168. Pro-BNP was 591. At the time of discharge, CO2 was 28, BUN and creatinine of 10 and 0.78. Urinalysis with 100 mg/dL of proteinuria, otherwise was negative. Urine culture reveals no growth. RADIOGRAPHS:  CT scan of the brain with no acute findings. Chest x-ray negative. HOSPITAL COURSE:  The patient was admitted and placed on an insulin drip with the hypoglycemia protocol. She was placed in intensive care unit. By the time I saw her, her ketoacidosis had  broken, and her CO2 was in the 20s. She was then gradually transferred to a basal insulin and sliding scale coverage. Simultaneously, her mental status improved to back to her baseline. She could give me very few details about her premorbid condition. In any event, her blood sugars normalized. Basal insulin was slightly decreased because of the tendency to develop hypoglycemia nocturnally.   With the reduction in this and placement back on her prandial insulin, blood sugars were reasonably stable. She had no adverse effects other than edema of her lower extremities related to the volume overload, given IV fluid administration. There were no overt signs of congestive heart failure. FINAL DIAGNOSES:  1. Diabetic ketoacidosis. 2.  Diabetes mellitus type 1 B.  3.  Progressive dementia. 4.  Primary hypertension. 5.  Dyslipidemia. 6.  Hypothyroidism. DISPOSITION:  1. The patient will be discharged home ambulatory on an ADA diet with bedtime snack. I emphasize the importance of reading at the same time everyday as I do each time I see the patient. This will minimize development of hypoglycemia and glycemic variability. 2.  The patient is in the process of getting continuous glucose monitoring with a Mayra device. I have been attempting to get this for the last several months and they have been unsuccessful, but hopefully things will change. 3.  She remains a full code. 4.  Discharge medications include the following:  Amlodipine 10 mg daily, clopidogrel 75 mg daily, fenofibrate 145 mg daily, insulin 2 to 4 units before breakfast, 4 units before lunch and 4 units before dinner along with degludec 16 units q.a.m., Hyzaar 100/25 q.a.m., metoprolol succinate 25 mg q.a.m., pravastatin 80 mg at bedtime, Alphagan 0.15% one drop both eyes b.i.d., Levoxyl 0.175 mg daily. 5.  The patient will return to the office in the next three to five days and hopefully by then, she will have to obtain her Dickey device. This was indeed discussed with the family as it always is with emphasis placed on more supervision for the patient.       MD KENNETH Escalante/[email or web address]  D:  11/24/2019 10:03  T:  11/25/2019 6:28  JOB #:  [id number]

## 2019-11-26 ENCOUNTER — TELEPHONE (OUTPATIENT)
Dept: FAMILY MEDICINE CLINIC | Age: 79
End: 2019-11-26

## 2019-11-26 ENCOUNTER — OFFICE VISIT (OUTPATIENT)
Dept: INTERNAL MEDICINE CLINIC | Age: 79
End: 2019-11-26

## 2019-11-26 VITALS
SYSTOLIC BLOOD PRESSURE: 154 MMHG | RESPIRATION RATE: 16 BRPM | HEIGHT: 65 IN | TEMPERATURE: 98.2 F | DIASTOLIC BLOOD PRESSURE: 68 MMHG | HEART RATE: 76 BPM | BODY MASS INDEX: 28.21 KG/M2 | OXYGEN SATURATION: 97 % | WEIGHT: 169.3 LBS

## 2019-11-26 DIAGNOSIS — E03.2 HYPOTHYROIDISM DUE TO MEDICATION: ICD-10-CM

## 2019-11-26 DIAGNOSIS — E10.65 HYPERGLYCEMIA DUE TO TYPE 1 DIABETES MELLITUS (HCC): ICD-10-CM

## 2019-11-26 DIAGNOSIS — R60.9 EDEMA, UNSPECIFIED TYPE: ICD-10-CM

## 2019-11-26 DIAGNOSIS — I10 ESSENTIAL HYPERTENSION: ICD-10-CM

## 2019-11-26 DIAGNOSIS — F01.50 VASCULAR DEMENTIA WITHOUT BEHAVIORAL DISTURBANCE (HCC): ICD-10-CM

## 2019-11-26 DIAGNOSIS — E10.10 DIABETIC KETOACIDOSIS WITHOUT COMA ASSOCIATED WITH TYPE 1 DIABETES MELLITUS (HCC): Primary | ICD-10-CM

## 2019-11-26 NOTE — TELEPHONE ENCOUNTER
Received referral from Nurse Navigator post discharge follow up for elevated A1c and DKA admission, for diabetes education. Informed daughter patient and family can be seen for diabetes education and management at [facility] where my office is, at no charge. Pt sees PCP today. Given my  Number to follow up with me after that visit. (Dter was at work when called).   Familia Ocampo, PHARMD, CDE

## 2019-11-26 NOTE — PROGRESS NOTES
Chief Complaint   Patient presents with   [address]     Patient was admitted to [facility] on 11/19/19 for elevated blood sugar. 1. Have you been to the ER, urgent care clinic since your last visit? Hospitalized since your last visit? Yes When: 11/19/19 Where: [facility] [place]  Reason for visit: elevated blood sugar    2. Have you seen or consulted any other health care providers outside of the [address] [place] [address] since your last visit? Include any pap smears or colon screening.  No

## 2019-11-27 NOTE — PROGRESS NOTES
[address] [facility]  [address]  [address] [place] [id number]  Phone:  [phone]  Fax: [phone]       Chief Complaint   Patient presents with    Transitions Of Care     Patient was admitted to [facility] on 11/19/19 for elevated blood sugar. .      SUBJECTIVE:    Juhi Yung is a 69 y.o. female Comes in after being hospitalized for diabetic ketoacidosis. She now tells me that she did not take insulin for the previous 2-3 days before coming in because she apparently did not have any pen needles and did not notify her daughter. In any event, she is home and is taking her basal bolus preparation as described. She is taking [place] 16 units, which I reduced from the 20, and her ______________ of Humalog 4 units a.c. meals. She has had no interventional hypoglycemia since being home. The biggest thing that she needs is a Mayra device for continuous glucose monitoring. She does not check blood sugars on a consistent basis. She does not follow directions because she cannot remember. She has no support at home other than an intellectually challenged son, who cannot do that much. He knows what to do when her behavior is altered, indicating that her sugar is low, but that is about the extent of it. Unfortunately, the other children do not live in the house and it is difficult for them to monitor their mother as much as she needs to be monitored. Dementia is getting worse with significant impairment in her short term memory. This is her biggest problem. She was _____________ a moderate amount of fluid during her hospital stay _____________ of lower extremities. This is actually getting better. She has a history of primary hypertension and dyslipidemia _______________ compliant with all of her other medications.            Current Outpatient Medications   Medication Sig Dispense Refill    insulin degludec (TRESIBA FLEXTOUCH U-100) 100 unit/mL (3 mL) inpn 16 Units by SubCUTAneous route daily. 14 units every morning 1 Adjustable Dose Pre-filled Pen Syringe 11    levothyroxine (SYNTHROID) 175 mcg tablet TAKE 1 TABLET BY MOUTH EVERY DAY 90 Tab 3    amLODIPine (NORVASC) 10 mg tablet TAKE 1 TABLET BY MOUTH EVERY DAY IN THE MORNING 90 Tab 3    pravastatin (PRAVACHOL) 80 mg tablet TAKE 1 TABLET BY MOUTH EVERY DAY 90 Tab 3    losartan-hydroCHLOROthiazide (HYZAAR) 100-25 mg per tablet TAKE 1 TABLET BY MOUTH EVERY DAY 90 Tab 3    fenofibrate nanocrystallized (TRICOR) 145 mg tablet Take 145 mg by mouth daily.  clopidogrel (PLAVIX) 75 mg tab TAKE 1 TAB BY MOUTH DAILY. 11    metoprolol succinate (TOPROL-XL) 25 mg XL tablet Take 1 Tab by mouth daily. 30 Tab 11    insulin aspart U-100 (NOVOLOG) 100 unit/mL (3 mL) inpn 4 units AC breakfast,lunch, and dinner (Patient taking differently: by SubCUTAneous route. 4 units AC breakfast,lunch, and dinner) 1 Adjustable Dose Pre-filled Pen Syringe 11    brimonidine (ALPHAGAN) 0.15 % ophthalmic solution Administer 1 Drop to both eyes two (2) times a day.        Past Medical History:   Diagnosis Date    Diabetes ([address]     [facility] failure [address] [facility]     unknown to family    Hypercholesteremia     Hypertension     Stroke ([facility]     Thyroid disease      Past Surgical History:   Procedure Laterality Date    HX GYN      HX HEENT      thyroidectomy    HX HYSTERECTOMY      REMOVAL GALLBLADDER      THYROIDECTOMY       No Known Allergies      REVIEW OF SYSTEMS:  General: negative for - chills or fever  ENT: negative for - headaches, nasal congestion or tinnitus  Respiratory: negative for - cough, hemoptysis, shortness of breath or wheezing  Cardiovascular : negative for - chest pain, edema, palpitations or shortness of breath  Gastrointestinal: negative for - abdominal pain, blood in stools, heartburn or nausea/vomiting  Genito-Urinary: no dysuria, trouble voiding, or hematuria  Musculoskeletal: negative for - gait disturbance, joint pain, joint stiffness or joint swelling  Neurological: no TIA or stroke symptoms  Hematologic: no bruises, no bleeding, no swollen glands  Integument: no lumps, mole changes, nail changes or rash  Endocrine: no malaise/lethargy or unexpected weight changes      Social History     Socioeconomic History    Marital status:      Spouse name: Not on file    Number of children: 1    Years of education: Not on file    Highest education level: Not on file   Occupational History    Occupation: retired   Tobacco Use    Smoking status: Never Smoker    Smokeless tobacco: Never Used   Substance and Sexual Activity    Alcohol use: No     Comment: been years    Drug use: No    Sexual activity: Not Currently     Family History   Problem Relation Age of Onset    Diabetes Father     No Known Problems Mother        OBJECTIVE:    Visit Vitals  /68   Pulse 76   Temp 98.2 °F (36.8 °C) (Oral)   Resp 16   Ht 5' 5\" (1.651 m)   Wt 169 lb 4.8 oz (76.8 kg)   SpO2 97%   BMI 28.17 kg/m²     CONSTITUTIONAL: well , well nourished, appears age appropriate  EYES: perrla, eom intact  ENMT:moist mucous membranes, pharynx clear  NECK: supple. Thyroid normal,no JVD  RESPIRATORY: Chest: clear to ascultation and percussion   CARDIOVASCULAR: Heart: regular rate and rhythm  GASTROINTESTINAL: Abdomen: soft, bowel sounds active  HEMATOLOGIC: no pathological lymph nodes palpated  MUSCULOSKELETAL: Extremities: Asymmetric edema left greater than right 1+, pulse 1+   INTEGUMENT: No unusual rashes or suspicious skin lesions noted. Nails appear normal.  NEUROLOGIC: non-focal exam   MENTAL STATUS: alert and oriented, appropriate affect      ASSESSMENT:  1. Diabetic ketoacidosis without coma associated with type 1 diabetes mellitus ([address]    2. Hyperglycemia due to type 1 diabetes mellitus ([address]    3. Vascular dementia without behavioral disturbance ([facility] [address]    4. Essential hypertension    5. Hypothyroidism due to medication    6.  Edema, unspecified type        PLAN:    1. Given the patient's degree of dementia, her support system is very poor. She will continue to be readmitted to the hospital.  2. She is not taking her insulin and again remains poorly controlled. She does need continuous glucose monitoring, which _______________ as far as being able to adjust her insulin accordingly. The problem I have is minimizing over giving her too much basal insulin. I will make no adjustments in her insulin for now. 3. As I stated earlier, her dementia is getting worse. Given the poor support system, this is going to create a problem. 4. BP has been excellent, no adjustments are made. 5. She will continue her thyroid supplement as prescribed. Her last TSH was ______________. 6. The edema of the lower extremities should resolve given the fact that she is on a diuretic for BP control and I anticipate gradual reduction in third space fluid. This is not related to cardiac decompensation. Follow-up and Dispositions    · Return in about 3 weeks (around 12/17/2019).            Vic Julio MD

## 2019-12-01 ENCOUNTER — APPOINTMENT (OUTPATIENT)
Dept: CT IMAGING | Age: 79
DRG: 871 | End: 2019-12-01
Attending: EMERGENCY MEDICINE
Payer: MEDICARE

## 2019-12-01 ENCOUNTER — APPOINTMENT (OUTPATIENT)
Dept: GENERAL RADIOLOGY | Age: 79
DRG: 871 | End: 2019-12-01
Attending: EMERGENCY MEDICINE
Payer: MEDICARE

## 2019-12-01 ENCOUNTER — APPOINTMENT (OUTPATIENT)
Dept: VASCULAR SURGERY | Age: 79
DRG: 871 | End: 2019-12-01
Attending: INTERNAL MEDICINE
Payer: MEDICARE

## 2019-12-01 ENCOUNTER — HOSPITAL ENCOUNTER (INPATIENT)
Age: 79
LOS: 3 days | Discharge: HOME HEALTH | DRG: 871 | End: 2019-12-04
Attending: EMERGENCY MEDICINE | Admitting: INTERNAL MEDICINE
Payer: MEDICARE

## 2019-12-01 DIAGNOSIS — N17.9 ACUTE KIDNEY INJURY (HCC): ICD-10-CM

## 2019-12-01 DIAGNOSIS — E09.10 DIABETIC KETOACIDOSIS WITHOUT COMA ASSOCIATED WITH DRUG OR CHEMICAL INDUCED DIABETES MELLITUS (HCC): Primary | ICD-10-CM

## 2019-12-01 DIAGNOSIS — R65.10 SIRS (SYSTEMIC INFLAMMATORY RESPONSE SYNDROME) (HCC): ICD-10-CM

## 2019-12-01 PROBLEM — G93.40 ENCEPHALOPATHY: Status: ACTIVE | Noted: 2019-12-01

## 2019-12-01 LAB
ALBUMIN SERPL-MCNC: 3.1 G/DL (ref 3.5–5)
ALBUMIN/GLOB SERPL: 0.9 {RATIO} (ref 1.1–2.2)
ALP SERPL-CCNC: 148 U/L (ref 45–117)
ALT SERPL-CCNC: 38 U/L (ref 12–78)
AMORPH CRY URNS QL MICRO: ABNORMAL
AMPHET UR QL SCN: NEGATIVE
ANION GAP SERPL CALC-SCNC: 15 MMOL/L (ref 5–15)
ANION GAP SERPL CALC-SCNC: 22 MMOL/L (ref 5–15)
ANION GAP SERPL CALC-SCNC: 31 MMOL/L (ref 5–15)
ANION GAP SERPL CALC-SCNC: ABNORMAL MMOL/L (ref 5–15)
APAP SERPL-MCNC: 4 UG/ML (ref 10–30)
APPEARANCE UR: ABNORMAL
APTT PPP: 24.5 SEC (ref 22.1–32)
ARTERIAL PATENCY WRIST A: ABNORMAL
ARTERIAL PATENCY WRIST A: ABNORMAL
ARTERIAL PATENCY WRIST A: YES
AST SERPL-CCNC: 35 U/L (ref 15–37)
BACTERIA URNS QL MICRO: NEGATIVE /HPF
BARBITURATES UR QL SCN: NEGATIVE
BASE DEFICIT BLDV-SCNC: 22 MMOL/L
BASE DEFICIT BLDV-SCNC: 7 MMOL/L
BASOPHILS # BLD: 0 K/UL (ref 0–0.1)
BASOPHILS NFR BLD: 0 % (ref 0–1)
BDY SITE: ABNORMAL
BENZODIAZ UR QL: NEGATIVE
BILIRUB SERPL-MCNC: 0.6 MG/DL (ref 0.2–1)
BILIRUB UR QL: NEGATIVE
BUN SERPL-MCNC: 32 MG/DL (ref 6–20)
BUN SERPL-MCNC: 40 MG/DL (ref 6–20)
BUN SERPL-MCNC: 44 MG/DL (ref 6–20)
BUN SERPL-MCNC: 44 MG/DL (ref 6–20)
BUN/CREAT SERPL: 20 (ref 12–20)
BUN/CREAT SERPL: 23 (ref 12–20)
CALCIUM SERPL-MCNC: 7.6 MG/DL (ref 8.5–10.1)
CALCIUM SERPL-MCNC: 7.7 MG/DL (ref 8.5–10.1)
CALCIUM SERPL-MCNC: 7.9 MG/DL (ref 8.5–10.1)
CALCIUM SERPL-MCNC: 8.7 MG/DL (ref 8.5–10.1)
CANNABINOIDS UR QL SCN: NEGATIVE
CHLORIDE SERPL-SCNC: 102 MMOL/L (ref 97–108)
CHLORIDE SERPL-SCNC: 105 MMOL/L (ref 97–108)
CHLORIDE SERPL-SCNC: 87 MMOL/L (ref 97–108)
CHLORIDE SERPL-SCNC: 95 MMOL/L (ref 97–108)
CO2 SERPL-SCNC: 12 MMOL/L (ref 21–32)
CO2 SERPL-SCNC: 20 MMOL/L (ref 21–32)
CO2 SERPL-SCNC: 5 MMOL/L (ref 21–32)
CO2 SERPL-SCNC: <5 MMOL/L (ref 21–32)
COCAINE UR QL SCN: NEGATIVE
COLOR UR: ABNORMAL
CREAT SERPL-MCNC: 1.39 MG/DL (ref 0.55–1.02)
CREAT SERPL-MCNC: 1.72 MG/DL (ref 0.55–1.02)
CREAT SERPL-MCNC: 1.91 MG/DL (ref 0.55–1.02)
CREAT SERPL-MCNC: 2.17 MG/DL (ref 0.55–1.02)
DIFFERENTIAL METHOD BLD: ABNORMAL
DRUG SCRN COMMENT,DRGCM: NORMAL
EOSINOPHIL # BLD: 0 K/UL (ref 0–0.4)
EOSINOPHIL NFR BLD: 0 % (ref 0–7)
EPITH CASTS URNS QL MICRO: ABNORMAL /LPF
ERYTHROCYTE [DISTWIDTH] IN BLOOD BY AUTOMATED COUNT: 14.6 % (ref 11.5–14.5)
EST. AVERAGE GLUCOSE BLD GHB EST-MCNC: ABNORMAL MG/DL
FLUAV AG NPH QL IA: NEGATIVE
FLUBV AG NOSE QL IA: NEGATIVE
GAS FLOW.O2 O2 DELIVERY SYS: ABNORMAL L/MIN
GLOBULIN SER CALC-MCNC: 3.5 G/DL (ref 2–4)
GLUCOSE BLD STRIP.AUTO-MCNC: 106 MG/DL (ref 65–100)
GLUCOSE BLD STRIP.AUTO-MCNC: 151 MG/DL (ref 65–100)
GLUCOSE BLD STRIP.AUTO-MCNC: 212 MG/DL (ref 65–100)
GLUCOSE BLD STRIP.AUTO-MCNC: 268 MG/DL (ref 65–100)
GLUCOSE BLD STRIP.AUTO-MCNC: 285 MG/DL (ref 65–100)
GLUCOSE BLD STRIP.AUTO-MCNC: 422 MG/DL (ref 65–100)
GLUCOSE BLD STRIP.AUTO-MCNC: 513 MG/DL (ref 65–100)
GLUCOSE BLD STRIP.AUTO-MCNC: 571 MG/DL (ref 65–100)
GLUCOSE BLD STRIP.AUTO-MCNC: >600 MG/DL (ref 65–100)
GLUCOSE SERPL-MCNC: 256 MG/DL (ref 65–100)
GLUCOSE SERPL-MCNC: 540 MG/DL (ref 65–100)
GLUCOSE SERPL-MCNC: 854 MG/DL (ref 65–100)
GLUCOSE SERPL-MCNC: 925 MG/DL (ref 65–100)
GLUCOSE UR STRIP.AUTO-MCNC: >1000 MG/DL
HBA1C MFR BLD: >14 % (ref 4–5.6)
HCO3 BLDV-SCNC: 18.2 MMOL/L (ref 23–28)
HCO3 BLDV-SCNC: 7.2 MMOL/L (ref 23–28)
HCT VFR BLD AUTO: 34.7 % (ref 35–47)
HEMOCCULT STL QL: NEGATIVE
HGB BLD-MCNC: 10.6 G/DL (ref 11.5–16)
HGB UR QL STRIP: NEGATIVE
IMM GRANULOCYTES # BLD AUTO: 0 K/UL (ref 0–0.04)
IMM GRANULOCYTES NFR BLD AUTO: 0 % (ref 0–0.5)
INR PPP: 1 (ref 0.9–1.1)
KETONES UR QL STRIP.AUTO: 40 MG/DL
LACTATE BLD-SCNC: 7.5 MMOL/L (ref 0.4–2)
LACTATE BLD-SCNC: 7.51 MMOL/L (ref 0.4–2)
LACTATE SERPL-SCNC: 2.4 MMOL/L (ref 0.4–2)
LACTATE SERPL-SCNC: 4.2 MMOL/L (ref 0.4–2)
LEUKOCYTE ESTERASE UR QL STRIP.AUTO: NEGATIVE
LYMPHOCYTES # BLD: 0.6 K/UL (ref 0.8–3.5)
LYMPHOCYTES NFR BLD: 3 % (ref 12–49)
MAGNESIUM SERPL-MCNC: 1.8 MG/DL (ref 1.6–2.4)
MAGNESIUM SERPL-MCNC: 2.3 MG/DL (ref 1.6–2.4)
MAGNESIUM SERPL-MCNC: 2.4 MG/DL (ref 1.6–2.4)
MAGNESIUM SERPL-MCNC: 2.5 MG/DL (ref 1.6–2.4)
MCH RBC QN AUTO: 32.4 PG (ref 26–34)
MCHC RBC AUTO-ENTMCNC: 30.5 G/DL (ref 30–36.5)
MCV RBC AUTO: 106.1 FL (ref 80–99)
METAMYELOCYTES NFR BLD MANUAL: 1 %
METHADONE UR QL: NEGATIVE
MONOCYTES # BLD: 0.9 K/UL (ref 0–1)
MONOCYTES NFR BLD: 4 % (ref 5–13)
MYELOCYTES NFR BLD MANUAL: 1 %
NEUTS BAND NFR BLD MANUAL: 5 %
NEUTS SEG # BLD: 19.4 K/UL (ref 1.8–8)
NEUTS SEG NFR BLD: 86 % (ref 32–75)
NITRITE UR QL STRIP.AUTO: NEGATIVE
NRBC # BLD: 0 K/UL (ref 0–0.01)
NRBC BLD-RTO: 0 PER 100 WBC
O2/TOTAL GAS SETTING VFR VENT: 21 %
OPIATES UR QL: NEGATIVE
PCO2 BLD: <15 MMHG (ref 35–45)
PCO2 BLDV: 21 MMHG (ref 41–51)
PCO2 BLDV: 31.1 MMHG (ref 41–51)
PCP UR QL: NEGATIVE
PH BLD: 6.99 [PH] (ref 7.35–7.45)
PH BLDV: 7.15 [PH] (ref 7.32–7.42)
PH BLDV: 7.38 [PH] (ref 7.32–7.42)
PH UR STRIP: 5 [PH] (ref 5–8)
PHOSPHATE SERPL-MCNC: 9.3 MG/DL (ref 2.6–4.7)
PLATELET # BLD AUTO: 443 K/UL (ref 150–400)
PMV BLD AUTO: 9.8 FL (ref 8.9–12.9)
PO2 BLD: 116 MMHG (ref 80–100)
PO2 BLDV: 32 MMHG (ref 25–40)
PO2 BLDV: 65 MMHG (ref 25–40)
POTASSIUM SERPL-SCNC: 2.9 MMOL/L (ref 3.5–5.1)
POTASSIUM SERPL-SCNC: 3.2 MMOL/L (ref 3.5–5.1)
POTASSIUM SERPL-SCNC: 5 MMOL/L (ref 3.5–5.1)
POTASSIUM SERPL-SCNC: 5.8 MMOL/L (ref 3.5–5.1)
PROT SERPL-MCNC: 6.6 G/DL (ref 6.4–8.2)
PROT UR STRIP-MCNC: NEGATIVE MG/DL
PROTHROMBIN TIME: 10.3 SEC (ref 9–11.1)
RBC # BLD AUTO: 3.27 M/UL (ref 3.8–5.2)
RBC #/AREA URNS HPF: ABNORMAL /HPF (ref 0–5)
RBC MORPH BLD: ABNORMAL
SALICYLATES SERPL-MCNC: 4.9 MG/DL (ref 2.8–20)
SAO2 % BLDV: 61 % (ref 65–88)
SAO2 % BLDV: 86 % (ref 65–88)
SERVICE CMNT-IMP: ABNORMAL
SODIUM SERPL-SCNC: 126 MMOL/L (ref 136–145)
SODIUM SERPL-SCNC: 131 MMOL/L (ref 136–145)
SODIUM SERPL-SCNC: 136 MMOL/L (ref 136–145)
SODIUM SERPL-SCNC: 140 MMOL/L (ref 136–145)
SP GR UR REFRACTOMETRY: 1.02 (ref 1–1.03)
SPECIMEN TYPE: ABNORMAL
THERAPEUTIC RANGE,PTTT: NORMAL SECS (ref 58–77)
TOTAL RESP. RATE, ITRR: 20
UROBILINOGEN UR QL STRIP.AUTO: 0.2 EU/DL (ref 0.2–1)
WBC # BLD AUTO: 21.3 K/UL (ref 3.6–11)
WBC MORPH BLD: ABNORMAL
WBC URNS QL MICRO: ABNORMAL /HPF (ref 0–4)

## 2019-12-01 PROCEDURE — 74011250636 HC RX REV CODE- 250/636: Performed by: EMERGENCY MEDICINE

## 2019-12-01 PROCEDURE — 99285 EMERGENCY DEPT VISIT HI MDM: CPT

## 2019-12-01 PROCEDURE — 96375 TX/PRO/DX INJ NEW DRUG ADDON: CPT

## 2019-12-01 PROCEDURE — 85610 PROTHROMBIN TIME: CPT

## 2019-12-01 PROCEDURE — 81001 URINALYSIS AUTO W/SCOPE: CPT

## 2019-12-01 PROCEDURE — 80048 BASIC METABOLIC PNL TOTAL CA: CPT

## 2019-12-01 PROCEDURE — 87040 BLOOD CULTURE FOR BACTERIA: CPT

## 2019-12-01 PROCEDURE — 75810000455 HC PLCMT CENT VENOUS CATH LVL 2 5182

## 2019-12-01 PROCEDURE — 93971 EXTREMITY STUDY: CPT

## 2019-12-01 PROCEDURE — 83605 ASSAY OF LACTIC ACID: CPT

## 2019-12-01 PROCEDURE — 71045 X-RAY EXAM CHEST 1 VIEW: CPT

## 2019-12-01 PROCEDURE — 74011250636 HC RX REV CODE- 250/636: Performed by: NEUROMUSCULOSKELETAL MEDICINE & OMM

## 2019-12-01 PROCEDURE — 74011000250 HC RX REV CODE- 250: Performed by: INTERNAL MEDICINE

## 2019-12-01 PROCEDURE — 82803 BLOOD GASES ANY COMBINATION: CPT

## 2019-12-01 PROCEDURE — 83036 HEMOGLOBIN GLYCOSYLATED A1C: CPT

## 2019-12-01 PROCEDURE — 82962 GLUCOSE BLOOD TEST: CPT

## 2019-12-01 PROCEDURE — 80307 DRUG TEST PRSMV CHEM ANLYZR: CPT

## 2019-12-01 PROCEDURE — 87804 INFLUENZA ASSAY W/OPTIC: CPT

## 2019-12-01 PROCEDURE — C9113 INJ PANTOPRAZOLE SODIUM, VIA: HCPCS | Performed by: INTERNAL MEDICINE

## 2019-12-01 PROCEDURE — 74011000258 HC RX REV CODE- 258: Performed by: INTERNAL MEDICINE

## 2019-12-01 PROCEDURE — 80053 COMPREHEN METABOLIC PANEL: CPT

## 2019-12-01 PROCEDURE — 96361 HYDRATE IV INFUSION ADD-ON: CPT

## 2019-12-01 PROCEDURE — 94761 N-INVAS EAR/PLS OXIMETRY MLT: CPT

## 2019-12-01 PROCEDURE — 74011250637 HC RX REV CODE- 250/637: Performed by: NEUROMUSCULOSKELETAL MEDICINE & OMM

## 2019-12-01 PROCEDURE — 84100 ASSAY OF PHOSPHORUS: CPT

## 2019-12-01 PROCEDURE — 93005 ELECTROCARDIOGRAM TRACING: CPT

## 2019-12-01 PROCEDURE — 83735 ASSAY OF MAGNESIUM: CPT

## 2019-12-01 PROCEDURE — 65610000006 HC RM INTENSIVE CARE

## 2019-12-01 PROCEDURE — 74011000250 HC RX REV CODE- 250: Performed by: EMERGENCY MEDICINE

## 2019-12-01 PROCEDURE — 36600 WITHDRAWAL OF ARTERIAL BLOOD: CPT

## 2019-12-01 PROCEDURE — 96374 THER/PROPH/DIAG INJ IV PUSH: CPT

## 2019-12-01 PROCEDURE — 74011000258 HC RX REV CODE- 258: Performed by: EMERGENCY MEDICINE

## 2019-12-01 PROCEDURE — 74011250636 HC RX REV CODE- 250/636: Performed by: INTERNAL MEDICINE

## 2019-12-01 PROCEDURE — 74176 CT ABD & PELVIS W/O CONTRAST: CPT

## 2019-12-01 PROCEDURE — 82272 OCCULT BLD FECES 1-3 TESTS: CPT

## 2019-12-01 PROCEDURE — 74011636637 HC RX REV CODE- 636/637: Performed by: EMERGENCY MEDICINE

## 2019-12-01 PROCEDURE — 36415 COLL VENOUS BLD VENIPUNCTURE: CPT

## 2019-12-01 PROCEDURE — 85025 COMPLETE CBC W/AUTO DIFF WBC: CPT

## 2019-12-01 PROCEDURE — 70450 CT HEAD/BRAIN W/O DYE: CPT

## 2019-12-01 PROCEDURE — 74011250636 HC RX REV CODE- 250/636: Performed by: HOSPITALIST

## 2019-12-01 PROCEDURE — 85730 THROMBOPLASTIN TIME PARTIAL: CPT

## 2019-12-01 RX ORDER — INSULIN DEGLUDEC 100 U/ML
16 INJECTION, SOLUTION SUBCUTANEOUS DAILY
Status: ON HOLD | COMMUNITY
End: 2020-04-25 | Stop reason: SDUPTHER

## 2019-12-01 RX ORDER — ACETAMINOPHEN 325 MG/1
650 TABLET ORAL
Status: DISCONTINUED | OUTPATIENT
Start: 2019-12-01 | End: 2019-12-04 | Stop reason: HOSPADM

## 2019-12-01 RX ORDER — SODIUM CHLORIDE 9 MG/ML
150 INJECTION, SOLUTION INTRAVENOUS CONTINUOUS
Status: DISCONTINUED | OUTPATIENT
Start: 2019-12-01 | End: 2019-12-01

## 2019-12-01 RX ORDER — ONDANSETRON 2 MG/ML
4 INJECTION INTRAMUSCULAR; INTRAVENOUS
Status: DISCONTINUED | OUTPATIENT
Start: 2019-12-01 | End: 2019-12-04 | Stop reason: HOSPADM

## 2019-12-01 RX ORDER — LEVOFLOXACIN 5 MG/ML
750 INJECTION, SOLUTION INTRAVENOUS EVERY 24 HOURS
Status: DISCONTINUED | OUTPATIENT
Start: 2019-12-01 | End: 2019-12-01 | Stop reason: DRUGHIGH

## 2019-12-01 RX ORDER — DEXTROSE MONOHYDRATE 25 G/50ML
25-50 INJECTION, SOLUTION INTRAVENOUS AS NEEDED
Status: DISCONTINUED | OUTPATIENT
Start: 2019-12-01 | End: 2019-12-04 | Stop reason: HOSPADM

## 2019-12-01 RX ORDER — LEVOFLOXACIN 5 MG/ML
750 INJECTION, SOLUTION INTRAVENOUS EVERY 24 HOURS
Status: DISCONTINUED | OUTPATIENT
Start: 2019-12-01 | End: 2019-12-01 | Stop reason: SDUPTHER

## 2019-12-01 RX ORDER — LEVOFLOXACIN 5 MG/ML
750 INJECTION, SOLUTION INTRAVENOUS
Status: DISCONTINUED | OUTPATIENT
Start: 2019-12-01 | End: 2019-12-02

## 2019-12-01 RX ORDER — POTASSIUM CHLORIDE 29.8 MG/ML
20 INJECTION INTRAVENOUS
Status: COMPLETED | OUTPATIENT
Start: 2019-12-01 | End: 2019-12-02

## 2019-12-01 RX ORDER — NOREPINEPHRINE BITARTRATE/D5W 4MG/250ML
4 PLASTIC BAG, INJECTION (ML) INTRAVENOUS
Status: DISCONTINUED | OUTPATIENT
Start: 2019-12-01 | End: 2019-12-01 | Stop reason: CLARIF

## 2019-12-01 RX ORDER — SODIUM CHLORIDE 0.9 % (FLUSH) 0.9 %
5-40 SYRINGE (ML) INJECTION EVERY 8 HOURS
Status: DISCONTINUED | OUTPATIENT
Start: 2019-12-01 | End: 2019-12-04 | Stop reason: HOSPADM

## 2019-12-01 RX ORDER — SODIUM CHLORIDE AND POTASSIUM CHLORIDE .9; .15 G/100ML; G/100ML
SOLUTION INTRAVENOUS CONTINUOUS
Status: DISCONTINUED | OUTPATIENT
Start: 2019-12-01 | End: 2019-12-01

## 2019-12-01 RX ORDER — SODIUM CHLORIDE 0.9 % (FLUSH) 0.9 %
5-10 SYRINGE (ML) INJECTION AS NEEDED
Status: DISCONTINUED | OUTPATIENT
Start: 2019-12-01 | End: 2019-12-03 | Stop reason: SDUPTHER

## 2019-12-01 RX ORDER — INSULIN LISPRO 100 [IU]/ML
INJECTION, SOLUTION INTRAVENOUS; SUBCUTANEOUS
Status: DISCONTINUED | OUTPATIENT
Start: 2019-12-01 | End: 2019-12-02

## 2019-12-01 RX ORDER — SODIUM BICARBONATE 84 MG/ML
50 INJECTION, SOLUTION INTRAVENOUS ONCE
Status: COMPLETED | OUTPATIENT
Start: 2019-12-01 | End: 2019-12-01

## 2019-12-01 RX ORDER — MAGNESIUM SULFATE 100 %
4 CRYSTALS MISCELLANEOUS AS NEEDED
Status: DISCONTINUED | OUTPATIENT
Start: 2019-12-01 | End: 2019-12-01 | Stop reason: SDUPTHER

## 2019-12-01 RX ORDER — DEXTROSE, SODIUM CHLORIDE, AND POTASSIUM CHLORIDE 5; .45; .3 G/100ML; G/100ML; G/100ML
INJECTION INTRAVENOUS CONTINUOUS
Status: DISCONTINUED | OUTPATIENT
Start: 2019-12-01 | End: 2019-12-03

## 2019-12-01 RX ORDER — MAGNESIUM SULFATE 100 %
4 CRYSTALS MISCELLANEOUS AS NEEDED
Status: DISCONTINUED | OUTPATIENT
Start: 2019-12-01 | End: 2019-12-02

## 2019-12-01 RX ORDER — DEXTROSE 50 % IN WATER (D50W) INTRAVENOUS SYRINGE
25-50 AS NEEDED
Status: DISCONTINUED | OUTPATIENT
Start: 2019-12-01 | End: 2019-12-01 | Stop reason: SDUPTHER

## 2019-12-01 RX ORDER — SODIUM CHLORIDE 0.9 % (FLUSH) 0.9 %
5-40 SYRINGE (ML) INJECTION AS NEEDED
Status: DISCONTINUED | OUTPATIENT
Start: 2019-12-01 | End: 2019-12-04 | Stop reason: HOSPADM

## 2019-12-01 RX ADMIN — SODIUM CHLORIDE 710 ML: 900 INJECTION, SOLUTION INTRAVENOUS at 09:53

## 2019-12-01 RX ADMIN — PANTOPRAZOLE SODIUM 40 MG: 40 INJECTION, POWDER, FOR SOLUTION INTRAVENOUS at 20:51

## 2019-12-01 RX ADMIN — Medication 40 ML: at 21:00

## 2019-12-01 RX ADMIN — CEFEPIME 2 G: 2 INJECTION, POWDER, FOR SOLUTION INTRAVENOUS at 10:18

## 2019-12-01 RX ADMIN — NOREPINEPHRINE BITARTRATE 23 MCG/MIN: 1 INJECTION INTRAVENOUS at 12:24

## 2019-12-01 RX ADMIN — Medication 1 AMPULE: at 20:51

## 2019-12-01 RX ADMIN — SODIUM CHLORIDE 10.8 UNITS/HR: 9 INJECTION, SOLUTION INTRAVENOUS at 10:10

## 2019-12-01 RX ADMIN — NOREPINEPHRINE BITARTRATE 10 MCG/MIN: 1 INJECTION INTRAVENOUS at 11:53

## 2019-12-01 RX ADMIN — SODIUM CHLORIDE 150 ML/HR: 900 INJECTION, SOLUTION INTRAVENOUS at 11:57

## 2019-12-01 RX ADMIN — SODIUM BICARBONATE 50 MEQ: 84 INJECTION, SOLUTION INTRAVENOUS at 10:15

## 2019-12-01 RX ADMIN — PANTOPRAZOLE SODIUM 40 MG: 40 INJECTION, POWDER, FOR SOLUTION INTRAVENOUS at 14:24

## 2019-12-01 RX ADMIN — DEXTROSE MONOHYDRATE, SODIUM CHLORIDE, AND POTASSIUM CHLORIDE: 50; 4.5; 2.98 INJECTION, SOLUTION INTRAVENOUS at 23:25

## 2019-12-01 RX ADMIN — WATER: 1000 INJECTION, SOLUTION INTRAVENOUS at 22:09

## 2019-12-01 RX ADMIN — SODIUM CHLORIDE 27 UNITS/HR: 9 INJECTION, SOLUTION INTRAVENOUS at 14:24

## 2019-12-01 RX ADMIN — SODIUM CHLORIDE 16.2 UNITS/HR: 9 INJECTION, SOLUTION INTRAVENOUS at 12:25

## 2019-12-01 RX ADMIN — Medication 40 ML: at 20:52

## 2019-12-01 RX ADMIN — SODIUM CHLORIDE 30 UNITS/HR: 9 INJECTION, SOLUTION INTRAVENOUS at 16:18

## 2019-12-01 RX ADMIN — SODIUM CHLORIDE 18.7 UNITS/HR: 9 INJECTION, SOLUTION INTRAVENOUS at 20:08

## 2019-12-01 RX ADMIN — NOREPINEPHRINE BITARTRATE 10 MCG/MIN: 1 INJECTION INTRAVENOUS at 20:11

## 2019-12-01 RX ADMIN — WATER: 1000 INJECTION, SOLUTION INTRAVENOUS at 12:08

## 2019-12-01 RX ADMIN — VANCOMYCIN HYDROCHLORIDE 2000 MG: 10 INJECTION, POWDER, LYOPHILIZED, FOR SOLUTION INTRAVENOUS at 15:24

## 2019-12-01 RX ADMIN — NOREPINEPHRINE BITARTRATE 26 MCG/MIN: 1 INJECTION INTRAVENOUS at 12:33

## 2019-12-01 RX ADMIN — NOREPINEPHRINE BITARTRATE 15 MCG/MIN: 1 INJECTION INTRAVENOUS at 12:06

## 2019-12-01 RX ADMIN — HUMAN INSULIN 10 UNITS: 100 INJECTION, SOLUTION SUBCUTANEOUS at 10:36

## 2019-12-01 RX ADMIN — SODIUM CHLORIDE AND POTASSIUM CHLORIDE: 9; 1.49 INJECTION, SOLUTION INTRAVENOUS at 19:01

## 2019-12-01 RX ADMIN — POTASSIUM CHLORIDE 20 MEQ: 400 INJECTION, SOLUTION INTRAVENOUS at 22:59

## 2019-12-01 RX ADMIN — SODIUM CHLORIDE 1000 ML: 900 INJECTION, SOLUTION INTRAVENOUS at 09:31

## 2019-12-01 RX ADMIN — LEVOFLOXACIN 750 MG: 5 INJECTION, SOLUTION INTRAVENOUS at 10:46

## 2019-12-01 NOTE — H&P
Hospitalist Admission Note    NAME: Devorah Murguia   :  1940   MRN:  [id number]     Date/Time:  2019 11:43 AM    Patient PCP: Ashly Vance MD  ______________________________________________________________________  Given the patient's current clinical presentation, I have a high level of concern for decompensation if discharged from the emergency department. Complex decision making was performed, which includes reviewing the patient's available past medical records, laboratory results, and x-ray films. My assessment of this patient's clinical condition and my plan of care is as follows. Assessment / Plan:  SIRS and acute severe metabolic acidosis due to DKA in setting of insulin dependent   family endorse noncompliance with medical therapy  Hyperosmolar hyperglycemia with DKA  Lactic / metabolic acidosis   Hyponatremia 126  Hyperkalemia repeat  5 antinial 5.8  leukocytosis  Stress ? Blood cx/ua cx xrays   Trend LA  ONEYDA cr 1.91  Admit ICU  -completing NS in ER  -starting IV bicarb infusion ( ABG with pH 6.9) abgs per protocol  -starting IV insulin infusion. Check A1c; follow BG closely  -follow K, Mg Phos closely. Repeat Lactic acid   change fluid to D5NS at 150cc/h  when  blood glucose is <250    and cont gtt insulin until gap closed, then give his home insulin and overlap gtt for 2 hours.     Septic shock /etiology VRS DKA  leukocytosis  Stress ? Blood cx/ua cx xrays   Trend LA Intial 7.5  Follow Sepsis Protocol  Xray-: No acute abnormality identified allowing for technique. UA NGT   No signs of meningitis   Flu swab ngt   Follow Blood cx/trend La  Cont for now LVQ/cefepime  Holding Bp meds  Get procal  On pressors   Ct : Deep soft tissue gas in the left arm suspicious for possible  cellulitis. No acute intra-abdominal findings with incidental findings as above. Deep soft tissue gas in the left arm suspicious for possible  cellulitis.  By CT  Will Us/get ortho input  reevaluated the left arm. Left upper extremity arm compartments are soft full range of motion on passive range of motion of the joints including the shoulder and elbow there is no overlying redness, slight warmth but without palpable subcutaneous air. May be related to multiple IV tries. No clear signs for deep tissue infection or necr fascitis    pt is on LVQ/CEF may add vanco    Acute encephalopathy  -multifactorial from all of the above  -CT head No acute findings. Nonspecific white matter changes most compatible  with chronic small vessel ischemic and/or senescent change.     ?GI bleed  Occult blood ordered by er  SCD   H/Hq8  If cont to drop or + occult will consult GI  PPI for now      Hypertension, benign/essential: holding meds restart when hypotension ? ONEYDA resolves  - Hyperlipidemia:  con't tricor when able to eat      S/P thyroidectomy  -continue synthroid  when able to eat if not start IV     Code Status:   Full, unable to discuss  Surrogate Decision Maker:Son per records     DVT Prophylaxis:  SQ ?bleed  GI Prophylaxis: PPI              Subjective:   CHIEF COMPLAINT:  AMS and high blood glucose    HISTORY OF PRESENT ILLNESS:     Malou Olivera is a 69 y.o.   type 1 B diabetic presented to the emergency room, because of increasing lethargy and weakness. The history was exclusively obtained from the records She was evaluated and was found to be in diabetic ketoacidosis possible septic had no c/o  Initially. . This is unfortunately a recurring problem for the patient exclusively due to her noncompliance. Unfortunately, the assistance by the family has been somewhat limited. In any event, she is admitted for DKA. Pt responds to simple questions. We were asked to admit for work up and evaluation of the above problems.      Past Medical History:   Diagnosis Date    Diabetes ([facility] [address]     Heart failure ([facility] [address]     unknown to family    Hypercholesteremia     Hypertension     Stroke [facility])     Thyroid disease         Past Surgical History:   Procedure Laterality Date    HX GYN      HX HEENT      thyroidectomy    HX HYSTERECTOMY      REMOVAL GALLBLADDER      THYROIDECTOMY         Social History     Tobacco Use    Smoking status: Never Smoker    Smokeless tobacco: Never Used   Substance Use Topics    Alcohol use: No     Comment: been years        Family History   Problem Relation Age of Onset    Diabetes Father     No Known Problems Mother      No Known Allergies     Prior to Admission medications    Medication Sig Start Date End Date Taking? Authorizing Provider   insulin degludec (TRESIBA FLEXTOUCH U-100) 100 unit/mL (3 mL) inpn 16 Units by SubCUTAneous route daily. 14 units every morning 11/24/19   Esdras Gutierrez MD   levothyroxine (SYNTHROID) 175 mcg tablet TAKE 1 TABLET BY MOUTH EVERY DAY 11/22/19   Esdras Gutierrez MD   amLODIPine (NORVASC) 10 mg tablet TAKE 1 TABLET BY MOUTH EVERY DAY IN THE MORNING 11/3/19   Esdras Gutierrez MD   pravastatin (PRAVACHOL) 80 mg tablet TAKE 1 TABLET BY MOUTH EVERY DAY 11/3/19   Esdras Gutierrez MD   losartan-hydroCHLOROthiazide [facility]) 100-25 mg per tablet TAKE 1 TABLET BY MOUTH EVERY DAY 11/1/19   Esdras Gutierrez MD   fenofibrate nanocrystallized (TRICOR) 145 mg tablet Take 145 mg by mouth daily. 3/26/19   Provider, Historical   clopidogrel (PLAVIX) 75 mg tab TAKE 1 TAB BY MOUTH DAILY. 4/10/19   Provider, Historical   metoprolol succinate (TOPROL-XL) 25 mg XL tablet Take 1 Tab by mouth daily. 6/15/19   Esdras Gutierrez MD   insulin aspart U-100 (NOVOLOG) 100 unit/mL (3 mL) inpn 4 units AC breakfast,lunch, and dinner  Patient taking differently: by SubCUTAneous route. 4 units AC breakfast,lunch, and dinner 6/15/19   Esdras Gutierrez MD   brimonidine (ALPHAGAN) 0.15 % ophthalmic solution Administer 1 Drop to both eyes two (2) times a day. 1/30/15   Provider, Historical       REVIEW OF SYSTEMS:     I am not able to complete the review of systems because:    The patient is intubated and sedated   x The patient has altered mental status due to his acute medical problems    The patient has baseline aphasia from prior stroke(s)    The patient has baseline dementia and is not reliable historian    The patient is in acute medical distress and unable to provide information             Objective:   VITALS:    Visit Vitals  BP (!) 89/28   Pulse 81   Temp 98 °F (36.7 °C)   Resp 24   Ht 5' 5\" (1.651 m)   Wt 76.7 kg (169 lb)   SpO2 100%   BMI 28.12 kg/m²       PHYSICAL EXAM:  General:          Arouses to voice. Ill appearing. appears stated age. HEENT:           Atraumatic, anicteric sclerae, pink conjunctivae, Dry mucous memb  Neck:               Supple, symmetrical,  thyroid: non tender no meningismus   Lungs:             Clear to auscultation bilaterally. No Wheezing or Rhonchi. No rales. Chest wall:      No tenderness  No Accessory muscle use. Heart:              Regular  rhythm,    No edema  Abdomen:        Soft, non-tender. Not distended. Bowel sounds normal  Extremities:     No cyanosis. No clubbing,  Skin turgor poor, Capillary refill normal, Radial dial pulse 2+  Skin:                Not pale. Not Jaundiced  No rashes (+) right groin central line  Psych:             Poor insight. .  Not anxious or agitated. Neurologic:      EOMs intact. No facial asymmetry. Lethargic but Arouses to voice and touch. Speech weak . Follows simple commands.  Sensation grossly intact.  _______________________________________________________________________  Care Plan discussed with:    Comments   Patient x    Family  x By phone son tim BANUELOS x    Care Manager                    Consultant:  x    _______________________________________________________________________  Expected  Disposition:   Home with Family    HH/PT/OT/RN    SNF/LTC x   TAYA    ________________________________________________________________________  TOTAL TIME:  61  Minutes    Critical Care Provided     Minutes non procedure based      Comments    x Reviewed previous records   >50% of visit spent in counseling and coordination of care x Discussion with patient and/or family and questions answered       ________________________________________________________________________  Signed: Gisela Christiansen MD    Procedures: see electronic medical records for all procedures/Xrays and details which were not copied into this note but were reviewed prior to creation of Plan. LAB DATA REVIEWED:    Recent Results (from the past 24 hour(s))   GLUCOSE, POC    Collection Time: 12/01/19  9:11 AM   Result Value Ref Range    Glucose (POC) >600 (HH) 65 - 100 mg/dL    Performed by Lavelle Mendez    GLUCOSE, POC    Collection Time: 12/01/19  9:12 AM   Result Value Ref Range    Glucose (POC) >600 (HH) 65 - 100 mg/dL    Performed by Lavelle Mendez    CBC WITH AUTOMATED DIFF    Collection Time: 12/01/19  9:22 AM   Result Value Ref Range    WBC 21.3 (H) 3.6 - 11.0 K/uL    RBC 3.27 (L) 3.80 - 5.20 M/uL    HGB 10.6 (L) 11.5 - 16.0 g/dL    HCT 34.7 (L) 35.0 - 47.0 %    .1 (H) 80.0 - 99.0 FL    MCH 32.4 26.0 - 34.0 PG    MCHC 30.5 30.0 - 36.5 g/dL    RDW 14.6 (H) 11.5 - 14.5 %    PLATELET 454 (H) 485 - 400 K/uL    MPV 9.8 8.9 - 12.9 FL    NRBC 0.0 0  WBC    ABSOLUTE NRBC 0.00 0.00 - 0.01 K/uL    NEUTROPHILS 86 (H) 32 - 75 %    BAND NEUTROPHILS 5 %    LYMPHOCYTES 3 (L) 12 - 49 %    MONOCYTES 4 (L) 5 - 13 %    EOSINOPHILS 0 0 - 7 %    BASOPHILS 0 0 - 1 %    METAMYELOCYTES 1 %    MYELOCYTES 1 %    IMMATURE GRANULOCYTES 0 0.0 - 0.5 %    ABS. NEUTROPHILS 19.4 (H) 1.8 - 8.0 K/UL    ABS. LYMPHOCYTES 0.6 (L) 0.8 - 3.5 K/UL    ABS. MONOCYTES 0.9 0.0 - 1.0 K/UL    ABS. EOSINOPHILS 0.0 0.0 - 0.4 K/UL    ABS. BASOPHILS 0.0 0.0 - 0.1 K/UL    ABS. IMM.  GRANS. 0.0 0.00 - 0.04 K/UL    DF MANUAL      RBC COMMENTS MACROCYTOSIS  PRESENT        WBC COMMENTS VACUOLATED POLYS     METABOLIC PANEL, COMPREHENSIVE    Collection Time: 12/01/19  9:22 AM   Result Value Ref Range    Sodium 126 (L) 136 - 145 mmol/L    Potassium 5.8 (H) 3.5 - 5.1 mmol/L    Chloride 87 (L) 97 - 108 mmol/L    CO2 <5 (LL) 21 - 32 mmol/L    Anion gap Cannot be calculated 5 - 15 mmol/L    Glucose 925 (HH) 65 - 100 mg/dL    BUN 44 (H) 6 - 20 MG/DL    Creatinine 1.91 (H) 0.55 - 1.02 MG/DL    BUN/Creatinine ratio 23 (H) 12 - 20      GFR est AA 31 (L) >60 ml/min/1.73m2    GFR est non-AA 25 (L) >60 ml/min/1.73m2    Calcium 8.7 8.5 - 10.1 MG/DL    Bilirubin, total 0.6 0.2 - 1.0 MG/DL    ALT (SGPT) 38 12 - 78 U/L    AST (SGOT) 35 15 - 37 U/L    Alk.  phosphatase 148 (H) 45 - 117 U/L    Protein, total 6.6 6.4 - 8.2 g/dL    Albumin 3.1 (L) 3.5 - 5.0 g/dL    Globulin 3.5 2.0 - 4.0 g/dL    A-G Ratio 0.9 (L) 1.1 - 2.2     MAGNESIUM    Collection Time: 12/01/19  9:22 AM   Result Value Ref Range    Magnesium 2.5 (H) 1.6 - 2.4 mg/dL   POC LACTIC ACID    Collection Time: 12/01/19  9:39 AM   Result Value Ref Range    Lactic Acid (POC) 7.51 (HH) 0.40 - 2.00 mmol/L   EKG, 12 LEAD, INITIAL    Collection Time: 12/01/19  9:48 AM   Result Value Ref Range    Ventricular Rate 102 BPM    Atrial Rate 102 BPM    P-R Interval 200 ms    QRS Duration 96 ms    Q-T Interval 384 ms    QTC Calculation (Bezet) 500 ms    Calculated P Axis 34 degrees    Calculated R Axis -53 degrees    Calculated T Axis 28 degrees    Diagnosis       Sinus tachycardia with premature supraventricular complexes  Left axis deviation  Low voltage QRS  Possible Anterolateral infarct , age undetermined  When compared with ECG of 19-NOV-2019 05:55,  Sinus rhythm has replaced Atrial flutter  Borderline criteria for Anterior infarct are now present  Borderline criteria for Anterolateral infarct are now present  ST now depressed in Lateral leads  T wave inversion now evident in Inferior leads     POC G3 - PUL    Collection Time: 12/01/19  9:54 AM   Result Value Ref Range    pH (POC) 6.993 (LL) 7.35 - 7.45      pCO2 (POC) <15.0 (L) 35.0 - 45.0 MMHG    pO2 (POC) 116 (H) 80 - 100 MMHG    Site RIGHT RADIAL      Device: ROOM AIR      Allens test (POC) YES      Specimen type (POC) ARTERIAL     URINALYSIS W/MICROSCOPIC    Collection Time: 12/01/19  9:59 AM   Result Value Ref Range    Color YELLOW/STRAW      Appearance CLOUDY (A) CLEAR      Specific gravity 1.024 1.003 - 1.030      pH (UA) 5.0 5.0 - 8.0      Protein NEGATIVE  NEG mg/dL    Glucose >1,000 (A) NEG mg/dL    Ketone 40 (A) NEG mg/dL    Bilirubin NEGATIVE  NEG      Blood NEGATIVE  NEG      Urobilinogen 0.2 0.2 - 1.0 EU/dL    Nitrites NEGATIVE  NEG      Leukocyte Esterase NEGATIVE  NEG      WBC 0-4 0 - 4 /hpf    RBC 0-5 0 - 5 /hpf    Epithelial cells MANY (A) FEW /lpf    Bacteria NEGATIVE  NEG /hpf    Amorphous Crystals 1+ (A) NEG   INFLUENZA A & B AG (RAPID TEST)    Collection Time: 12/01/19  9:59 AM   Result Value Ref Range    Influenza A Antigen NEGATIVE  NEG      Influenza B Antigen NEGATIVE  NEG     GLUCOSE, POC    Collection Time: 12/01/19 10:44 AM   Result Value Ref Range    Glucose (POC) >600 (HH) 65 - 100 mg/dL    Performed by [employer]    GLUCOSE, POC    Collection Time: 12/01/19 10:45 AM   Result Value Ref Range    Glucose (POC) >600 (HH) 65 - 100 mg/dL    Performed by [employer]    GLUCOSE, POC    Collection Time: 12/01/19 11:14 AM   Result Value Ref Range    Glucose (POC) >600 (HH) 65 - 100 mg/dL    Performed by [employer]    GLUCOSE, POC    Collection Time: 12/01/19 11:15 AM   Result Value Ref Range    Glucose (POC) >600 (HH) 65 - 100 mg/dL    Performed by Kelby Oden

## 2019-12-01 NOTE — ED TRIAGE NOTES
Patient arrives to the emergency department via EMS with a chief complaint of high blood sugar. Pt reports taking her insulin last night but EMS reports the vial look unused. EMS received glucose reading of high; repeated twice here with same result. Pt is oriented to everything except the date.

## 2019-12-01 NOTE — ED NOTES
Dr. Livia Clark does not want insulin started till she sees patient K+, spoke with Brittany Schulz in pharmacy about verifying meds

## 2019-12-01 NOTE — PROGRESS NOTES
12/1/2019    INTENSIVIST PROGRESS NOTE:     Patient seen and evaluated, chart reviewed   70 yo female presented with weakness, malaise  Found to have DKA, septic shock, cellulitis  Started on IV abx, insulin drip, bicarb drip  Now pt in CCU critically ill    ROS: unable to obtain    Visit Vitals  /41   Pulse 95   Temp 97.9 °F (36.6 °C)   Resp 22   Ht 5' 5\" (1.651 m)   Wt 76.7 kg (169 lb)   SpO2 100%   BMI 28.12 kg/m²       General: weak, debilitated  Eyes: anicteric  HEENT: dry oral mucosa  Neck: FROM  CV: RRR tachy  Lungs: soft  Abd: soft  : no flank pain  Ext: no edema  Skin: no rash  Musculoskeletal: normal inspection  Neuro: non focal, lethargic    CXR: clear    Labs reviewed    A/P:  - DKA: IVF, insulin drip, bicarb drip  - septic shock: pressors  - severe sepsis: IV abx  - ONEYDA: IVF  - DVT prophylaxis  - critically ill  - NPO  - Will assist on disposition planning when stable for transfer  Mili Thomason MD   CC TIME 35 MINUTES

## 2019-12-01 NOTE — ROUTINE PROCESS
TRANSFER - IN REPORT: 
Verbal report received from [facility] VIN Rivera(name) on Marguerita Spatz  being received from the Emergency Department(unit) for routine progression of care Report consisted of patients Situation, Background, Assessment and  
Recommendations(SBAR). Information from the following report(s) SBAR, Kardex, ED Summary, Procedure Summary, Intake/Output, MAR, Accordion, Recent Results, Med Rec Status, Cardiac Rhythm sinus rhythm, Alarm Parameters  and Quality Measures was reviewed with the receiving nurse. Opportunity for questions and clarification was provided. Assessment completed upon patients arrival to unit and care assumed. 1530:Transferred from the ED with the Glucostalizer, and the Sodium Bicarbonate drip is in use; the Levophed is being weaned to maintain a MAP greater than 65 mmHg. She is awake, pleasant and cooperative, but forgetful.

## 2019-12-01 NOTE — PROGRESS NOTES
Pharmacy Clarification of the Prior to Admission Medication Regimen Retrospective to the Admission Medication Reconciliation-Follow Up Needed    The patient was not interviewed regarding clarification of the prior to admission medication regimen due to AMS. No family/friends were in the room at time of interview    MHT called the patient's daughter, Shantel James [phone], and left a voice mial.    MHT called the patient's son, Pavel Hernandez [phone], who stated to call the patient's outpatient pharmacy, [facility].     MHT called [facility], [phone], and spoke with Saskia Spangler, pharmacist, who was able to verify the patient's refill history. Patient's daughter, Shayna Justice, called T back and stated the patient menages her own medications. T updated the PTA med list based on information received from the patient's outpatient pharmacy. Information Obtained From: outpatient pharmacy, RX Query    Recommendations/Findings: The following amendments were made to the patient's active medication list on file at [facility]:     1) Additions: NONE    2) Removals:   fenofibrate  hyzaar    3) Changes:  insulin degludec (TRESIBA FLEXTOUCH U-100) 100 unit/mL (3 mL) inpn (Old regimen: multi sig /New regimen: 20 units daily)    4) Pertinent Pharmacy Findings: The medication history will need to be re-evaluated at a later time during admission when patient is willing/able to participate or if more information is provided. PTA medication list was corrected to the following:     Prior to Admission Medications   Prescriptions Last Dose Informant Taking? amLODIPine (NORVASC) 10 mg tablet Unknown at Unknown time Other No   Sig: TAKE 1 TABLET BY MOUTH EVERY DAY IN THE MORNING   brimonidine (ALPHAGAN) 0.15 % ophthalmic solution Unknown at Unknown time Other No   Sig: Administer 1 Drop to both eyes two (2) times a day. clopidogrel (PLAVIX) 75 mg tab Unknown at Unknown time Other No   Sig: TAKE 1 TAB BY MOUTH DAILY.    insulin aspart U-100 (NOVOLOG) 100 unit/mL (3 mL) inpn Unknown at Unknown time Other No   Si units AC breakfast,lunch, and dinner   insulin degludec (TRESIBA FLEXTOUCH U-100) 100 unit/mL (3 mL) inpn Unknown at Unknown time Other No   Si Units by SubCUTAneous route daily. levothyroxine (SYNTHROID) 175 mcg tablet Unknown at Unknown time Other No   Sig: TAKE 1 TABLET BY MOUTH EVERY DAY   metoprolol succinate (TOPROL-XL) 25 mg XL tablet Unknown at Unknown time Other No   Sig: Take 1 Tab by mouth daily.    pravastatin (PRAVACHOL) 80 mg tablet Unknown at Unknown time Other No   Sig: TAKE 1 TABLET BY MOUTH EVERY DAY      Facility-Administered Medications: None          Thank you,  Deuce Ashraf CPhT  Medication History Pharmacy Technician

## 2019-12-01 NOTE — PROGRESS NOTES
Bedside and Verbal shift change report given to OPAL Torres (oncoming nurse) by Negro Armstrong RN (offgoing nurse). Report included the following information SBAR, Kardex, Intake/Output, MAR, Recent Results and Cardiac Rhythm [id number]: Ultrasound at bedside for ultrasound of L arm. Son at bedside. Update and pamphlet given (access code [id number] given.)  7843: Assessment completed. Patient awake, alert, oriented to person and place; disoriented to time and situation. Moves all extremities without difficulty. Denies complaints of pain. Follows commands, pupils 3mm peter and reactive to light. Lungs clear, O2 @ 3L/min NC r/t pO2 68 on blood gases per report. Abdomen soft, active bowel sounds. Purewick in place. Peripheral pulses palpable. Trace peter lower extremity edema. On Levophed to maintain MAP>65, NS @ 150, Bicarb drip @ 100ml/hr, insulin drip per glucostabilizer and vancomycin infusing. 1722: Paged Dr. Krupa Narvaez re: stat ortho consult. 1725: pO2 low, but value from VENOUS blood gases. PO2 116 on arterial gases. Removed NC O2.   1733: Dr. Rodrick Nowak calls re: stat consult. Dr. Rodrick Nowak has reviewed chart. No need for orthopedic surgery consult at this time. 1753: Paged Dr. Ace Glass re: K 3.2.   1813: Notified Dr. Ace Glass of K 3.2. Dr. Ace Glass to enter orders. 2110: Paged tele hospitalist re: K down to 2.9 (added 20meq KCL to NS IVF @ 150ml/hr at 1900); lactic acid down to 2.4. Anion gap up to 15, bicarb up to 20 (on Bicarb drip @ 100ml/hr). Requested clarification re: giving additional potassium, discontinuing q4h venous blood gases, adding D5 to IVF once bg <250 x 2.   2210: Repaged tele-hospitalist.  2251: Dr. Osmar Almonte, tele hospitalist calls back, orders received. Then Dr. Tony Chandler also calls back immediately after conversation with Dr. Osmar Almonte. New orders received. Add dextrose and more K to IVF, discontinue bicarb drip. May discontinue lactic acids and venous blood gases. Extra K runs.  No need to repeat labs at 0000 since K repletion in progress. 0000: BG 79, repeat 75. Per glucostablizer, hold insulin drip, give 8ml D50 IV. Assessmnet unchanged. Remains alert, oriented to person, place, situation. Disoriented to time (states year is 1966, but knows President is Trump.)  0400: Reassessment unchanged. Levophed currently infusing at 3mcg/min. 0730: Bedside and Verbal shift change report given to RADHA Rojo RN (oncoming nurse) by OPAL Hollis RN (offgoing nurse). Report included the following information SBAR, Kardex, Intake/Output, MAR, Recent Results and Cardiac Rhythm SR, occasional bursts of SVT.

## 2019-12-01 NOTE — CONSULTS
Ortho consult called for apparent cellulitis of left arm/biceps. CT findings only notable for gas in soft tissue. No abscess. Site is same location as multiple u/s guided iv attempts, and gas almost certainly due to this. ER evaluation of left arm notable for minimal findings, certainly nothing consistent with a drainable abscess. No surgical indication. If the arm is cellulitic (which seems doubtful given the evidence), the patient is already being treated with iv antibiotics.

## 2019-12-01 NOTE — PROGRESS NOTES
Pharmacy Automatic Renal Dosing Protocol - Antimicrobials    Indication for Antimicrobials: sepsis, possible cellulitis     Current Regimen of Each Antimicrobial:  Cefepime 2g iv q24h (Start Date ; day 1)  Levofloxacin 750 mg iv q48h (start date ; day 1)  Vancomycin - pharmacy to dose (start , day 1)    Previous Antimicrobial Therapy:   Last admission ceftriaxone -    Goal Level: VANCOMYCIN TROUGH GOAL RANGE    Vancomycin Trough: 10 - 15 mcg/mL  (AUC: 400 - 600 mg/hr/Liter/day)     Date Dose & Interval Measured (mcg/mL) Extrapolated (mcg/mL)                       Date & time of next level: before 2nd maintenance dose      Significant Cultures:    blood cx: pending    (last admission  blood and urine cx negative)    Radiology / Imaging results: (X-ray, CT scan or MRI):    CT abd: Deep soft tissue gas in the left arm suspicious for possible  cellulitis. No acute intra-abdominal findings with incidental findings as above. Paralysis, amputations, malnutrition: none noted    Labs:  Recent Labs     19  1203 19  0922   CREA 2.17* 1.91*   BUN 44* 44*   WBC  --  21.3*     Temp (24hrs), Av °F (36.7 °C), Min:97.9 °F (36.6 °C), Max:98 °F (36.7 °C)    Creatinine Clearance (mL/min) or Dialysis: 19.5 mL/min (IBW)     Impression/Plan:   Vancomycin 2000 mg loading dose then 1000 mg q36h  Check vancomycin trough before 2nd maintenance dose, although may adjust dose sooner than that if renal function improves  Continue renally adjusted cefepime and levofloxacin  BMP, CBC  Antimicrobial stop date TBD     Pharmacy will follow daily and adjust medications as appropriate for renal function and/or serum levels.     Thank you,  YOUSIF Caldera

## 2019-12-01 NOTE — ED PROVIDER NOTES
EMERGENCY DEPARTMENT HISTORY AND PHYSICAL EXAM      Date: 12/1/2019  Patient Name: Jesu Aguilar    History of Presenting Illness     Chief Complaint   Patient presents with    High Blood Sugar     reading high on glucometer per EMS       History Provided By: Patient and EMS    HPI: Jesu Aguilar, 69 y.o. female presents to the ED with history of type Ib diabetes mellitus, hypertension, hyperlipidemia, thyroid disease and recent DKA admission in November of this year presents with confusion, fatigue and concern for elevated blood sugar. Per EMS, glucometer read high, on arrival, glucometer in the emergency department read high. Looking back at record patient has historical noncompliance with insulin, is still doing her own insulin at home. There is limited history from the patient herself but she is oriented to person place and who the president is. She cannot relay whether she fell, has had recent illnesses or other complaints. There are no other complaints, changes, or physical findings at this time. PCP: Vickey Lee MD    No current facility-administered medications on file prior to encounter. Current Outpatient Medications on File Prior to Encounter   Medication Sig Dispense Refill    insulin degludec (TRESIBA FLEXTOUCH U-100) 100 unit/mL (3 mL) inpn 16 Units by SubCUTAneous route daily. 14 units every morning 1 Adjustable Dose Pre-filled Pen Syringe 11    levothyroxine (SYNTHROID) 175 mcg tablet TAKE 1 TABLET BY MOUTH EVERY DAY 90 Tab 3    amLODIPine (NORVASC) 10 mg tablet TAKE 1 TABLET BY MOUTH EVERY DAY IN THE MORNING 90 Tab 3    pravastatin (PRAVACHOL) 80 mg tablet TAKE 1 TABLET BY MOUTH EVERY DAY 90 Tab 3    losartan-hydroCHLOROthiazide (HYZAAR) 100-25 mg per tablet TAKE 1 TABLET BY MOUTH EVERY DAY 90 Tab 3    fenofibrate nanocrystallized (TRICOR) 145 mg tablet Take 145 mg by mouth daily.  clopidogrel (PLAVIX) 75 mg tab TAKE 1 TAB BY MOUTH DAILY.   11    metoprolol succinate (TOPROL-XL) 25 mg XL tablet Take 1 Tab by mouth daily. 30 Tab 11    insulin aspart U-100 (NOVOLOG) 100 unit/mL (3 mL) inpn 4 units AC breakfast,lunch, and dinner (Patient taking differently: by SubCUTAneous route. 4 units AC breakfast,lunch, and dinner) 1 Adjustable Dose Pre-filled Pen Syringe 11    brimonidine (ALPHAGAN) 0.15 % ophthalmic solution Administer 1 Drop to both eyes two (2) times a day. Past History     Past Medical History:  Past Medical History:   Diagnosis Date    Diabetes ([address]     Heart failure [address] [address]     unknown to family    Hypercholesteremia     Hypertension     Stroke [address] [address]     Thyroid disease        Past Surgical History:  Past Surgical History:   Procedure Laterality Date    HX GYN      HX HEENT      thyroidectomy    HX HYSTERECTOMY      REMOVAL GALLBLADDER      THYROIDECTOMY         Family History:  Family History   Problem Relation Age of Onset    Diabetes Father     No Known Problems Mother        Social History:  Social History     Tobacco Use    Smoking status: Never Smoker    Smokeless tobacco: Never Used   Substance Use Topics    Alcohol use: No     Comment: been years    Drug use: No       Allergies:  No Known Allergies      Review of Systems   Review of Systems   Unable to perform ROS: Mental status change       Physical Exam   Physical Exam   Vital signs and nursing notes reviewed    CONSTITUTIONAL: Alert, in moderate distress; well-developed; well-nourished. Not feel febrile to the touch. HEAD:  Normocephalic, atraumatic  EYES: PERRL; EOM's intact. ENTM: Nose: no rhinorrhea; Throat: no erythema or exudate, mucous membranes dry. Neck:  Supple. trachea is midline. No JVD. RESP: Chest clear, equal breath sounds. - W/R/R respiratory rate equals 34 breaths/min. CV: S1 and S2 WNL; No murmurs, gallops or rubs. 2+ radial and DP pulses bilaterally. Tachycardic. GI: non-distended, normal bowel sounds, abdomen soft and non-tender.  No masses or organomegaly. Light brown stool on Hemoccult testing, chaperoned by VIN Schmidt. : No costo-vertebral angle tenderness. Few small inflamed hemorrhoids no active bleeding. BACK:  Non-tender, normal appearance  UPPER EXT:  Normal inspection. no joint or soft tissue swelling  LOWER EXT: No edema, no calf tenderness. NEURO: Alert and oriented x3, 5/5 strength and light touch sensation intact in bilateral upper and lower extremities. SKIN: No rashes; Warm and dry  PSYCH: Normal mood, normal affect    Diagnostic Study Results     Labs -     Recent Results (from the past 12 hour(s))   GLUCOSE, POC    Collection Time: 12/01/19  9:11 AM   Result Value Ref Range    Glucose (POC) >600 (HH) 65 - 100 mg/dL    Performed by Chase Arellano    GLUCOSE, POC    Collection Time: 12/01/19  9:12 AM   Result Value Ref Range    Glucose (POC) >600 (HH) 65 - 100 mg/dL    Performed by Chase Arellano    CBC WITH AUTOMATED DIFF    Collection Time: 12/01/19  9:22 AM   Result Value Ref Range    WBC 21.3 (H) 3.6 - 11.0 K/uL    RBC 3.27 (L) 3.80 - 5.20 M/uL    HGB 10.6 (L) 11.5 - 16.0 g/dL    HCT 34.7 (L) 35.0 - 47.0 %    .1 (H) 80.0 - 99.0 FL    MCH 32.4 26.0 - 34.0 PG    MCHC 30.5 30.0 - 36.5 g/dL    RDW 14.6 (H) 11.5 - 14.5 %    PLATELET 222 (H) 845 - 400 K/uL    MPV 9.8 8.9 - 12.9 FL    NRBC 0.0 0  WBC    ABSOLUTE NRBC 0.00 0.00 - 0.01 K/uL    NEUTROPHILS 86 (H) 32 - 75 %    BAND NEUTROPHILS 5 %    LYMPHOCYTES 3 (L) 12 - 49 %    MONOCYTES 4 (L) 5 - 13 %    EOSINOPHILS 0 0 - 7 %    BASOPHILS 0 0 - 1 %    METAMYELOCYTES 1 %    MYELOCYTES 1 %    IMMATURE GRANULOCYTES 0 0.0 - 0.5 %    ABS. NEUTROPHILS 19.4 (H) 1.8 - 8.0 K/UL    ABS. LYMPHOCYTES 0.6 (L) 0.8 - 3.5 K/UL    ABS. MONOCYTES 0.9 0.0 - 1.0 K/UL    ABS. EOSINOPHILS 0.0 0.0 - 0.4 K/UL    ABS. BASOPHILS 0.0 0.0 - 0.1 K/UL    ABS. IMM.  GRANS. 0.0 0.00 - 0.04 K/UL    DF MANUAL      RBC COMMENTS MACROCYTOSIS  PRESENT        WBC COMMENTS VACUOLATED POLYS     METABOLIC PANEL, COMPREHENSIVE Collection Time: 12/01/19  9:22 AM   Result Value Ref Range    Sodium 126 (L) 136 - 145 mmol/L    Potassium 5.8 (H) 3.5 - 5.1 mmol/L    Chloride 87 (L) 97 - 108 mmol/L    CO2 <5 (LL) 21 - 32 mmol/L    Anion gap Cannot be calculated 5 - 15 mmol/L    Glucose 925 (HH) 65 - 100 mg/dL    BUN 44 (H) 6 - 20 MG/DL    Creatinine 1.91 (H) 0.55 - 1.02 MG/DL    BUN/Creatinine ratio 23 (H) 12 - 20      GFR est AA 31 (L) >60 ml/min/1.73m2    GFR est non-AA 25 (L) >60 ml/min/1.73m2    Calcium 8.7 8.5 - 10.1 MG/DL    Bilirubin, total 0.6 0.2 - 1.0 MG/DL    ALT (SGPT) 38 12 - 78 U/L    AST (SGOT) 35 15 - 37 U/L    Alk.  phosphatase 148 (H) 45 - 117 U/L    Protein, total 6.6 6.4 - 8.2 g/dL    Albumin 3.1 (L) 3.5 - 5.0 g/dL    Globulin 3.5 2.0 - 4.0 g/dL    A-G Ratio 0.9 (L) 1.1 - 2.2     MAGNESIUM    Collection Time: 12/01/19  9:22 AM   Result Value Ref Range    Magnesium 2.5 (H) 1.6 - 2.4 mg/dL   POC LACTIC ACID    Collection Time: 12/01/19  9:39 AM   Result Value Ref Range    Lactic Acid (POC) 7.51 (HH) 0.40 - 2.00 mmol/L   EKG, 12 LEAD, INITIAL    Collection Time: 12/01/19  9:48 AM   Result Value Ref Range    Ventricular Rate 102 BPM    Atrial Rate 102 BPM    P-R Interval 200 ms    QRS Duration 96 ms    Q-T Interval 384 ms    QTC Calculation (Bezet) 500 ms    Calculated P Axis 34 degrees    Calculated R Axis -53 degrees    Calculated T Axis 28 degrees    Diagnosis       Sinus tachycardia with premature supraventricular complexes  Left axis deviation  Low voltage QRS  Possible Anterolateral infarct , age undetermined  When compared with ECG of 19-NOV-2019 05:55,  Sinus rhythm has replaced Atrial flutter  Borderline criteria for Anterior infarct are now present  Borderline criteria for Anterolateral infarct are now present  ST now depressed in Lateral leads  T wave inversion now evident in Inferior leads     POC G3 - PUL    Collection Time: 12/01/19  9:54 AM   Result Value Ref Range    pH (POC) 6.993 (LL) 7.35 - 7.45      pCO2 (POC) <15.0 (L) 35.0 - 45.0 MMHG    pO2 (POC) 116 (H) 80 - 100 MMHG    Site RIGHT RADIAL      Device: ROOM AIR      Allens test (POC) YES      Specimen type (POC) ARTERIAL     URINALYSIS W/MICROSCOPIC    Collection Time: 12/01/19  9:59 AM   Result Value Ref Range    Color YELLOW/STRAW      Appearance CLOUDY (A) CLEAR      Specific gravity 1.024 1.003 - 1.030      pH (UA) 5.0 5.0 - 8.0      Protein NEGATIVE  NEG mg/dL    Glucose >1,000 (A) NEG mg/dL    Ketone 40 (A) NEG mg/dL    Bilirubin NEGATIVE  NEG      Blood NEGATIVE  NEG      Urobilinogen 0.2 0.2 - 1.0 EU/dL    Nitrites NEGATIVE  NEG      Leukocyte Esterase NEGATIVE  NEG      WBC 0-4 0 - 4 /hpf    RBC 0-5 0 - 5 /hpf    Epithelial cells MANY (A) FEW /lpf    Bacteria NEGATIVE  NEG /hpf    Amorphous Crystals 1+ (A) NEG   INFLUENZA A & B AG (RAPID TEST)    Collection Time: 12/01/19  9:59 AM   Result Value Ref Range    Influenza A Antigen NEGATIVE  NEG      Influenza B Antigen NEGATIVE  NEG     GLUCOSE, POC    Collection Time: 12/01/19 10:44 AM   Result Value Ref Range    Glucose (POC) >600 (HH) 65 - 100 mg/dL    Performed by Haleigh Mckenzie    GLUCOSE, POC    Collection Time: 12/01/19 10:45 AM   Result Value Ref Range    Glucose (POC) >600 (HH) 65 - 100 mg/dL    Performed by Haleigh Mckenzie    GLUCOSE, POC    Collection Time: 12/01/19 11:14 AM   Result Value Ref Range    Glucose (POC) >600 (HH) 65 - 100 mg/dL    Performed by Haleighkelly Mckenzie    GLUCOSE, POC    Collection Time: 12/01/19 11:15 AM   Result Value Ref Range    Glucose (POC) >600 (HH) 65 - 100 mg/dL    Performed by Haleighkelly Mckenzie    OCCULT BLOOD, STOOL    Collection Time: 12/01/19 11:46 AM   Result Value Ref Range    Occult blood, stool NEGATIVE  NEG     PTT    Collection Time: 12/01/19 12:03 PM   Result Value Ref Range    aPTT 24.5 22.1 - 32.0 sec    aPTT, therapeutic range     58.0 - 77.0 SECS   PROTHROMBIN TIME + INR    Collection Time: 12/01/19 12:03 PM   Result Value Ref Range    INR 1.0 0.9 - 1.1      Prothrombin time 10.3 9.0 - 00.5 sec   METABOLIC PANEL, BASIC    Collection Time: 12/01/19 12:03 PM   Result Value Ref Range    Sodium 131 (L) 136 - 145 mmol/L    Potassium 5.0 3.5 - 5.1 mmol/L    Chloride 95 (L) 97 - 108 mmol/L    CO2 5 (LL) 21 - 32 mmol/L    Anion gap 31 (H) 5 - 15 mmol/L    Glucose 854 (HH) 65 - 100 mg/dL    BUN 44 (H) 6 - 20 MG/DL    Creatinine 2.17 (H) 0.55 - 1.02 MG/DL    BUN/Creatinine ratio 20 12 - 20      GFR est AA 27 (L) >60 ml/min/1.73m2    GFR est non-AA 22 (L) >60 ml/min/1.73m2    Calcium 7.9 (L) 8.5 - 10.1 MG/DL   MAGNESIUM    Collection Time: 12/01/19 12:03 PM   Result Value Ref Range    Magnesium 2.4 1.6 - 2.4 mg/dL   SALICYLATE    Collection Time: 12/01/19 12:03 PM   Result Value Ref Range    Salicylate level 4.9 2.8 - 20.0 MG/DL   ACETAMINOPHEN    Collection Time: 12/01/19 12:03 PM   Result Value Ref Range    Acetaminophen level 4 (L) 10 - 30 ug/mL   GLUCOSE, POC    Collection Time: 12/01/19 12:21 PM   Result Value Ref Range    Glucose (POC) >600 (HH) 65 - 100 mg/dL    Performed by [employer]    GLUCOSE, POC    Collection Time: 12/01/19 12:22 PM   Result Value Ref Range    Glucose (POC) >600 (HH) 65 - 100 mg/dL    Performed by [employer]    GLUCOSE, POC    Collection Time: 12/01/19  1:25 PM   Result Value Ref Range    Glucose (POC) >600 (HH) 65 - 100 mg/dL    Performed by [employer]    GLUCOSE, POC    Collection Time: 12/01/19  1:27 PM   Result Value Ref Range    Glucose (POC) >600 (HH) 65 - 100 mg/dL    Performed by [employer]        Radiologic Studies -   CT HEAD WO CONT   Final Result   IMPRESSION: No acute findings. Nonspecific white matter changes most compatible   with chronic small vessel ischemic and/or senescent change. CT ABD PELV WO CONT   Final Result   IMPRESSION: Deep soft tissue gas in the left arm suspicious for possible   cellulitis. No acute intra-abdominal findings with incidental findings as above.       XR CHEST PORT   Final Result   IMPRESSION: Right central venous catheter in place with no pneumothorax. XR CHEST PORT   Final Result   IMPRESSION: No acute abnormality identified allowing for technique. CT Results  (Last 48 hours)               12/01/19 1302  CT HEAD WO CONT Final result    Impression:  IMPRESSION: No acute findings. Nonspecific white matter changes most compatible   with chronic small vessel ischemic and/or senescent change. Narrative:  EXAM: CT HEAD WO CONT       INDICATION: Neuro deficit(s), subacute, progressive or fluctuating       COMPARISON: CT 11/9/2019. CONTRAST: None. TECHNIQUE: Unenhanced CT of the head was performed using 5 mm images. Brain and   bone windows were generated. CT dose reduction was achieved through use of a   standardized protocol tailored for this examination and automatic exposure   control for dose modulation. FINDINGS:   The ventricles and sulci are normal in size, shape and configuration and   midline. There is no significant change in pattern of periventricular white   matter hypodensity. There is no intracranial hemorrhage, extra-axial collection,   mass, mass effect or midline shift. The basilar cisterns are open. No acute   infarct is identified. The bone windows demonstrate no abnormalities. The   visualized portions of the paranasal sinuses and mastoid air cells are clear. 12/01/19 1302  CT ABD PELV WO CONT Final result    Impression:  IMPRESSION: Deep soft tissue gas in the left arm suspicious for possible   cellulitis. No acute intra-abdominal findings with incidental findings as above. Narrative:  EXAM: CT ABD PELV WO CONT       INDICATION: Diverticulitis; Altered mental status, poor historian with possible   sepsis, acute renal injury, please evaluate for inflammation versus infection. COMPARISON: CT 6/10/2019. CONTRAST:  None. TECHNIQUE:    Thin axial images were obtained through the abdomen and pelvis.  Coronal and   sagittal reconstructions were generated. Oral contrast was not administered. CT   dose reduction was achieved through use of a standardized protocol tailored for   this examination and automatic exposure control for dose modulation. The absence of intravenous contrast material reduces the sensitivity for   evaluation of the solid parenchymal organs of the abdomen. FINDINGS:    LUNG BASES: Clear. INCIDENTALLY IMAGED HEART AND MEDIASTINUM: Unremarkable. LIVER: No mass or biliary dilatation. GALLBLADDER: Unremarkable. SPLEEN: No mass. PANCREAS: No mass or ductal dilatation. ADRENALS: Unremarkable. KIDNEYS/URETERS: No mass, calculus, or hydronephrosis. STOMACH: Unremarkable. SMALL BOWEL: No dilatation or wall thickening. COLON: No dilatation or wall thickening. APPENDIX: Unremarkable. PERITONEUM: No ascites or pneumoperitoneum. RETROPERITONEUM: No lymphadenopathy or aortic aneurysm. REPRODUCTIVE ORGANS: Uterus and ovaries are surgically absent. URINARY BLADDER: No mass or calculus. BONES: Degenerative spine change. No acute fracture or aggressive lesion. ADDITIONAL COMMENTS: Soft tissue gas is noted within the soft tissues of the   left arm extending along the surface of the biceps tendon with associated   subcutaneous fat edema. CXR Results  (Last 48 hours)               12/01/19 1202  XR CHEST PORT Final result    Impression:  IMPRESSION: Right central venous catheter in place with no pneumothorax. Narrative:  EXAM: XR CHEST PORT       INDICATION: central line verification       COMPARISON: None. FINDINGS: A portable AP radiograph of the chest was obtained at 11:42 hours. The   patient is on a cardiac monitor. There is a right sided central venous catheter   which projects in expected position for internal jugular vein approach with the   tip of the proximally location of the mid superior vena cava. The lungs are   clear with no pneumothorax.  The cardiac and mediastinal contours and pulmonary   vascularity are normal.  The chest wall structures and visualized upper abdomen   show no acute findings with incidental note of degenerative spine changes. 12/01/19 1034  XR CHEST PORT Final result    Impression:  IMPRESSION: No acute abnormality identified allowing for technique. Narrative:  EXAM:  XR CHEST PORT       INDICATION:  meets SIRS criteria       COMPARISON:  2019       FINDINGS: A portable AP radiograph of the chest was obtained at 957 hours. The   patient is on a cardiac monitor. Duct of inspiration is shallow. Lungs are   clear allowing for technique. There is mild cardiomegaly. Soft tissue fullness   right paratracheal region is most likely vascular. .  There is minor thoracic   scoliosis. .                  Medical Decision Making   I am the first provider for this patient. I reviewed the vital signs, available nursing notes, past medical history, past surgical history, family history and social history. Vital Signs-Reviewed the patient's vital signs.   Patient Vitals for the past 12 hrs:   Temp Pulse Resp BP SpO2   12/01/19 1340  95 20 129/56 100 %   12/01/19 1318  95 28 143/45 100 %   12/01/19 1310  97 27 137/50 100 %   12/01/19 1308    137/49    12/01/19 1235  92 30 135/41 100 %   12/01/19 1230  91 29 125/40 100 %   12/01/19 1225  88 27 (!) 122/32 100 %   12/01/19 1220  86 28 (!) 115/28 100 %   12/01/19 1215  86 28 (!) 114/31 99 %   12/01/19 1210  83 26 (!) 96/27 99 %   12/01/19 1205  78 29 (!) 89/27 99 %   12/01/19 1200  75 28 (!) 80/27 99 %   12/01/19 1155  74 28 (!) 86/30 98 %   12/01/19 1150  74 28 (!) 85/31 97 %   12/01/19 1145  76 25 (!) 88/30 96 %   12/01/19 1140  78 28 (!) 89/34 99 %   12/01/19 1135  80 27 (!) 88/35 99 %   12/01/19 1130  81 24 (!) 89/28 100 %   12/01/19 1125  85 27 (!) 91/34 98 %   12/01/19 1120  81 27 (!) 87/27 99 %   12/01/19 1115  80 21 (!) 83/36 99 %   12/01/19 1113  79 27 (!) 81/29 99 %   12/01/19 1100  77 27 (!) 84/29 100 %   12/01/19 1055  77 29 (!) 95/32 100 %   12/01/19 1050  77 28 (!) 91/25 100 %   12/01/19 1045  81 29 (!) 80/34 100 %   12/01/19 1040  71 25 (!) 81/32 100 %   12/01/19 1035  74 21 (!) 81/36 100 %   12/01/19 1030  78 29 (!) 87/26 100 %   12/01/19 1025  83 27 (!) 81/35 100 %   12/01/19 1020  91 23 (!) 94/27 100 %   12/01/19 1015  92 30 (!) 95/29    12/01/19 1010  91 29 (!) 96/27    12/01/19 1008  93 30 (!) 94/28    12/01/19 1000  93 (!) 33 (!) 94/30 100 %   12/01/19 0933  99 30 (!) 80/25 100 %   12/01/19 0930  99 30 (!) 103/25 100 %   12/01/19 0925    (!) 112/28 100 %   12/01/19 0921    (!) 109/29 100 %   12/01/19 0913 98 °F (36.7 °C) (!) 101 28 120/42 100 %       EKG interpretation: (Preliminary)  EKG performed at 9:48 AM shows a sinus tachycardia at a rate of 102 with a borderline first-degree AV block, left axis deviation, isolated T wave inversion in lead III no visible acute ischemic changes. Records Reviewed: Nursing Notes, Old Medical Records, Previous electrocardiograms and Ambulance Run Sheet    Provider Notes (Medical Decision Making):   69-year-old female presenting with concern for DKA, likely due to not having insulin properly administered at home, meeting initial SIRS criteria, which could be secondary to patient being severely dehydrated and in DKA instead of infection. Have not found any focused infection initially in the emergency department but cannot confirm sepsis at this time. Due to blood pressure being low, map in the 40s, I have elected to broadly cover with antibiotics and treat with sepsis ordered fluids in case patient is bacteremic or has some initially unidentified infection in the emergency department.     Procedure Note - Central Line Placement:   11:30am  Performed by: Nisha Regalado MD      Immediately prior to the procedure, the patient was reevaluated and found suitable for the planned procedure and any planned medications. Immediately prior to the procedure a time out was called to verify the correct patient, procedure, equipment, staff, and marking as appropriate. Area was cleansed with Chlorprep and anesthetized with 2mLs of 1% lidocaine. Prepped and draped in sterile fashion. Landmarks identified. 20 gauge needle with triple lumen catheter was inserted into pt's Right, Internal Jugular Vein with ultrasound guidance. Line sutured in place; sterile dressing applied. Position: Trendelenburg  Number of attempts: 1  Estimated blood loss: <5ccs  The procedure took 16-30 minutes, and pt tolerated well. ED Course:   Initial assessment performed. The patients presenting problems have been discussed, and they are in agreement with the care plan formulated and outlined with them. I have encouraged them to ask questions as they arise throughout their visit. ED Course as of Dec 01 1340   Sun Dec 01, 2019   1012 Claudette in the 40s, initial IV fluid bag done, second 1 running under pressure bag bicarb extremely low, ABG showing showing pH of 6.99. Have ordered amp of bicarb and bicarb drip. Have ordered insulin but I am waiting for potassium. Had communication with RN been to not give any insulin until the potassium results.     [TL]      ED Course User Index  [TL] Leydi Kline MD       Critical Care Time:   CRITICAL CARE NOTE :    1:31 PM      IMPENDING DETERIORATION -Cardiovascular and Metabolic    ASSOCIATED RISK FACTORS - Hypotension, Shock, Dysrhythmia, Metabolic changes, Dehydration and CNS Decompensation    MANAGEMENT- Bedside Assessment and Supervision of Care    INTERPRETATION -  Xrays, CT Scan, Blood Gases, ECG, Blood Pressure and Cardiac Output Measures     INTERVENTIONS - hemodynamic mngmt and Metobolic interventions    CASE REVIEW - Hospitalist, Medical Sub-Specialist and Nursing    TREATMENT RESPONSE -Stable    PERFORMED BY - Self        NOTES   :      I have spent 100 minutes of critical care time involved in lab review, consultations with specialist, family decision- making, bedside attention and documentation. During this entire length of time I was immediately available to the patient . Mari Lopez MD      1:32 PM - I suspect that this patient has an active infection. 1:32 PM - The patient met criteria for severe sepsis at this time. PROVIDER SEPSIS PHYSICAL EXAM EVAL  Vital signs reviewed (see nursing documentation for further details):  Vitals:    12/01/19 1308 12/01/19 1310 12/01/19 1318 12/01/19 1340   BP: 137/49 137/50 143/45 129/56   Pulse:  97 95 95   Resp:  27 28 20   Temp:       SpO2:  100% 100% 100%       Cardiac exam:Regular Rate    Pulmonary exam:Normal    Peripheral pulses:Diminished    Capillary refill:Normal    Skin exam:pink    Exam performed byCarina Sweet MD              Disposition:  Admit    Admit Note:  11am  Pt is being admitted by Dr. Karen Almanza. The results of their tests and reason(s) for their admission have been discussed with pt and/or available family. They convey agreement and understanding for the need to be admitted and for admission diagnosis. 1:39 PM  Received call from radiologist who said there was visible gas in the left arm bicep tendon, raising concern for possible cellulitis. Went to the bedside and reevaluated the left arm. Left upper extremity arm compartments are soft full range of motion on passive range of motion of the joints including the shoulder and elbow there is no overlying redness, slight warmth but without palpable subcutaneous air. This was the same area where the patient had multiple attempts and ultrasound-guided IV. Patient on antibiotics in case this is cellulitis but low suspicion, not consistent with necrotizing fasciitis, could be iatrogenic. PLAN:  1. Current Discharge Medication List        2. Follow-up Information    None       Return to ED if worse     Diagnosis     Clinical Impression:   1.  Diabetic ketoacidosis without coma associated with drug or chemical induced diabetes mellitus ([facility] [address]    2. SIRS (systemic inflammatory response syndrome) (HCC)    3. Acute kidney injury [facility])        Attestations:    Virgilio Desai MD    Please note that this dictation was completed with [employer], the computer voice recognition software. Quite often unanticipated grammatical, syntax, homophones, and other interpretive errors are inadvertently transcribed by the computer software. Please disregard these errors. Please excuse any errors that have escaped final proofreading. Thank you.

## 2019-12-01 NOTE — ED NOTES
TRANSFER - OUT REPORT:    Verbal report given to [place] (name) on Pb Al  being transferred to CCU (unit) for routine progression of care       Report consisted of patients Situation, Background, Assessment and   Recommendations(SBAR). Information from the following report(s) SBAR, ED Summary, [facility] and Recent Results was reviewed with the receiving nurse. Lines:   Triple Lumen central line 12/01/19 Right Internal jugular (Active)   Central Line Being Utilized Yes 12/1/2019 11:58 AM   Site Assessment Clean, dry, & intact 12/1/2019 11:58 AM   Infiltration Assessment 0 12/1/2019 11:58 AM   Dressing Status Clean, dry, & intact 12/1/2019 11:58 AM   Proximal Hub Color/Line Status White 12/1/2019 11:58 AM   Positive Blood Return (Medial Site) Yes 12/1/2019 11:58 AM   Medial Hub Color/Line Status Blue 12/1/2019 11:58 AM   Positive Blood Return (Lateral Site) Yes 12/1/2019 11:58 AM   Distal Hub Color/Line Status Brown 12/1/2019 11:58 AM   Positive Blood Return (Site #3) Yes 12/1/2019 11:58 AM       Peripheral IV 12/01/19 Right Antecubital (Active)   Site Assessment Clean, dry, & intact 12/1/2019  9:25 AM   Phlebitis Assessment 0 12/1/2019  9:25 AM   Infiltration Assessment 0 12/1/2019  9:25 AM   Dressing Status Clean, dry, & intact 12/1/2019  9:25 AM   Dressing Type Transparent 12/1/2019  9:25 AM   Hub Color/Line Status Pink;Flushed 12/1/2019  9:25 AM   Action Taken Blood drawn 12/1/2019  9:25 AM       Peripheral IV 12/01/19 Left Antecubital (Active)   Site Assessment Clean, dry, & intact 12/1/2019  9:55 AM   Phlebitis Assessment 0 12/1/2019  9:55 AM   Infiltration Assessment 0 12/1/2019  9:55 AM   Dressing Status Clean, dry, & intact 12/1/2019  9:55 AM   Dressing Type Transparent;Tape 12/1/2019  9:55 AM        Opportunity for questions and clarification was provided.       Patient transported with:   CCU nurse

## 2019-12-01 NOTE — PROGRESS NOTES
Pharmacy Automatic Renal Dosing Protocol - Antimicrobials    Indication for Antimicrobials: sepsis     Current Regimen of Each Antimicrobial:  Cefepime 2g iv q24h (Start Date ; Day 1)  Levofloxacin 750 mg iv q48h (start date ; day 1)    Previous Antimicrobial Therapy:    Significant Cultures:    blood: pending    Radiology / Imaging results: (X-ray, CT scan or MRI):     Paralysis, amputations, malnutrition:     Labs:  Recent Labs     19  0922   CREA 1.91*   BUN 44*   WBC 21.3*     Temp (24hrs), Av °F (36.7 °C), Min:98 °F (36.7 °C), Max:98 °F (36.7 °C)    Creatinine Clearance (mL/min) or Dialysis: 22    Impression/Plan:   Empiric Cefepime 2g IV q8h renally adjusted to 2g IV q24h  Empiric Levofloxacin 750 mg IV q24h renally adjusted to 750 mg IV q48h. BMP, CBC  Antimicrobial stop date TBD     Pharmacy will follow daily and adjust medications as appropriate for renal function and/or serum levels.     Thank you,  Dave Schultz, PHARMD

## 2019-12-02 ENCOUNTER — APPOINTMENT (OUTPATIENT)
Dept: GENERAL RADIOLOGY | Age: 79
DRG: 871 | End: 2019-12-02
Attending: INTERNAL MEDICINE
Payer: MEDICARE

## 2019-12-02 LAB
ALBUMIN SERPL-MCNC: 2.3 G/DL (ref 3.5–5)
ALBUMIN/GLOB SERPL: 0.9 {RATIO} (ref 1.1–2.2)
ALP SERPL-CCNC: 81 U/L (ref 45–117)
ALT SERPL-CCNC: 28 U/L (ref 12–78)
ANION GAP SERPL CALC-SCNC: 9 MMOL/L (ref 5–15)
ANION GAP SERPL CALC-SCNC: 9 MMOL/L (ref 5–15)
ARTERIAL PATENCY WRIST A: YES
AST SERPL-CCNC: 30 U/L (ref 15–37)
ATRIAL RATE: 102 BPM
BASE DEFICIT BLD-SCNC: 4 MMOL/L
BASOPHILS # BLD: 0 K/UL (ref 0–0.1)
BASOPHILS NFR BLD: 0 % (ref 0–1)
BDY SITE: ABNORMAL
BILIRUB SERPL-MCNC: 0.5 MG/DL (ref 0.2–1)
BUN SERPL-MCNC: 20 MG/DL (ref 6–20)
BUN SERPL-MCNC: 23 MG/DL (ref 6–20)
BUN/CREAT SERPL: 21 (ref 12–20)
BUN/CREAT SERPL: 24 (ref 12–20)
CALCIUM SERPL-MCNC: 7.3 MG/DL (ref 8.5–10.1)
CALCIUM SERPL-MCNC: 7.6 MG/DL (ref 8.5–10.1)
CALCULATED P AXIS, ECG09: 34 DEGREES
CALCULATED R AXIS, ECG10: -53 DEGREES
CALCULATED T AXIS, ECG11: 28 DEGREES
CHLORIDE SERPL-SCNC: 108 MMOL/L (ref 97–108)
CHLORIDE SERPL-SCNC: 109 MMOL/L (ref 97–108)
CHOLEST SERPL-MCNC: 112 MG/DL
CO2 SERPL-SCNC: 24 MMOL/L (ref 21–32)
CO2 SERPL-SCNC: 24 MMOL/L (ref 21–32)
CREAT SERPL-MCNC: 0.94 MG/DL (ref 0.55–1.02)
CREAT SERPL-MCNC: 0.97 MG/DL (ref 0.55–1.02)
DIAGNOSIS, 93000: NORMAL
DIFFERENTIAL METHOD BLD: ABNORMAL
EOSINOPHIL # BLD: 0 K/UL (ref 0–0.4)
EOSINOPHIL NFR BLD: 0 % (ref 0–7)
ERYTHROCYTE [DISTWIDTH] IN BLOOD BY AUTOMATED COUNT: 13.7 % (ref 11.5–14.5)
GAS FLOW.O2 O2 DELIVERY SYS: ABNORMAL L/MIN
GLOBULIN SER CALC-MCNC: 2.6 G/DL (ref 2–4)
GLUCOSE BLD STRIP.AUTO-MCNC: 104 MG/DL (ref 65–100)
GLUCOSE BLD STRIP.AUTO-MCNC: 107 MG/DL (ref 65–100)
GLUCOSE BLD STRIP.AUTO-MCNC: 123 MG/DL (ref 65–100)
GLUCOSE BLD STRIP.AUTO-MCNC: 125 MG/DL (ref 65–100)
GLUCOSE BLD STRIP.AUTO-MCNC: 134 MG/DL (ref 65–100)
GLUCOSE BLD STRIP.AUTO-MCNC: 144 MG/DL (ref 65–100)
GLUCOSE BLD STRIP.AUTO-MCNC: 147 MG/DL (ref 65–100)
GLUCOSE BLD STRIP.AUTO-MCNC: 189 MG/DL (ref 65–100)
GLUCOSE BLD STRIP.AUTO-MCNC: 191 MG/DL (ref 65–100)
GLUCOSE BLD STRIP.AUTO-MCNC: 220 MG/DL (ref 65–100)
GLUCOSE BLD STRIP.AUTO-MCNC: 236 MG/DL (ref 65–100)
GLUCOSE BLD STRIP.AUTO-MCNC: 250 MG/DL (ref 65–100)
GLUCOSE BLD STRIP.AUTO-MCNC: 255 MG/DL (ref 65–100)
GLUCOSE BLD STRIP.AUTO-MCNC: 270 MG/DL (ref 65–100)
GLUCOSE BLD STRIP.AUTO-MCNC: 75 MG/DL (ref 65–100)
GLUCOSE BLD STRIP.AUTO-MCNC: 79 MG/DL (ref 65–100)
GLUCOSE BLD STRIP.AUTO-MCNC: 88 MG/DL (ref 65–100)
GLUCOSE BLD STRIP.AUTO-MCNC: 92 MG/DL (ref 65–100)
GLUCOSE SERPL-MCNC: 117 MG/DL (ref 65–100)
GLUCOSE SERPL-MCNC: 255 MG/DL (ref 65–100)
HCO3 BLD-SCNC: 20.4 MMOL/L (ref 22–26)
HCT VFR BLD AUTO: 28.7 % (ref 35–47)
HDLC SERPL-MCNC: 64 MG/DL
HDLC SERPL: 1.8 {RATIO} (ref 0–5)
HGB BLD-MCNC: 9.8 G/DL (ref 11.5–16)
IMM GRANULOCYTES # BLD AUTO: 0.1 K/UL (ref 0–0.04)
IMM GRANULOCYTES NFR BLD AUTO: 1 % (ref 0–0.5)
LDLC SERPL CALC-MCNC: 37.2 MG/DL (ref 0–100)
LIPID PROFILE,FLP: NORMAL
LYMPHOCYTES # BLD: 0.9 K/UL (ref 0.8–3.5)
LYMPHOCYTES NFR BLD: 5 % (ref 12–49)
MAGNESIUM SERPL-MCNC: 1.7 MG/DL (ref 1.6–2.4)
MAGNESIUM SERPL-MCNC: 1.8 MG/DL (ref 1.6–2.4)
MCH RBC QN AUTO: 31.7 PG (ref 26–34)
MCHC RBC AUTO-ENTMCNC: 34.1 G/DL (ref 30–36.5)
MCV RBC AUTO: 92.9 FL (ref 80–99)
MONOCYTES # BLD: 1.2 K/UL (ref 0–1)
MONOCYTES NFR BLD: 7 % (ref 5–13)
NEUTS SEG # BLD: 14.8 K/UL (ref 1.8–8)
NEUTS SEG NFR BLD: 87 % (ref 32–75)
NRBC # BLD: 0 K/UL (ref 0–0.01)
NRBC BLD-RTO: 0 PER 100 WBC
O2/TOTAL GAS SETTING VFR VENT: 21 %
P-R INTERVAL, ECG05: 200 MS
PCO2 BLD: 29.7 MMHG (ref 35–45)
PH BLD: 7.45 [PH] (ref 7.35–7.45)
PLATELET # BLD AUTO: 336 K/UL (ref 150–400)
PMV BLD AUTO: 9 FL (ref 8.9–12.9)
PO2 BLD: 64 MMHG (ref 80–100)
POTASSIUM SERPL-SCNC: 3.8 MMOL/L (ref 3.5–5.1)
POTASSIUM SERPL-SCNC: 4.2 MMOL/L (ref 3.5–5.1)
PROT SERPL-MCNC: 4.9 G/DL (ref 6.4–8.2)
Q-T INTERVAL, ECG07: 384 MS
QRS DURATION, ECG06: 96 MS
QTC CALCULATION (BEZET), ECG08: 500 MS
RBC # BLD AUTO: 3.09 M/UL (ref 3.8–5.2)
SAO2 % BLD: 93 % (ref 92–97)
SERVICE CMNT-IMP: ABNORMAL
SERVICE CMNT-IMP: NORMAL
SODIUM SERPL-SCNC: 141 MMOL/L (ref 136–145)
SODIUM SERPL-SCNC: 142 MMOL/L (ref 136–145)
SPECIMEN TYPE: ABNORMAL
TOTAL RESP. RATE, ITRR: 20
TRIGL SERPL-MCNC: 54 MG/DL (ref ?–150)
TSH SERPL DL<=0.05 MIU/L-ACNC: 1.36 UIU/ML (ref 0.36–3.74)
VENTRICULAR RATE, ECG03: 102 BPM
VLDLC SERPL CALC-MCNC: 10.8 MG/DL
WBC # BLD AUTO: 17.1 K/UL (ref 3.6–11)

## 2019-12-02 PROCEDURE — 74011250636 HC RX REV CODE- 250/636: Performed by: INTERNAL MEDICINE

## 2019-12-02 PROCEDURE — 83735 ASSAY OF MAGNESIUM: CPT

## 2019-12-02 PROCEDURE — C1751 CATH, INF, PER/CENT/MIDLINE: HCPCS

## 2019-12-02 PROCEDURE — 74011000258 HC RX REV CODE- 258: Performed by: INTERNAL MEDICINE

## 2019-12-02 PROCEDURE — 77030038269 HC DRN EXT URIN PURWCK BARD -A

## 2019-12-02 PROCEDURE — 85025 COMPLETE CBC W/AUTO DIFF WBC: CPT

## 2019-12-02 PROCEDURE — 36600 WITHDRAWAL OF ARTERIAL BLOOD: CPT

## 2019-12-02 PROCEDURE — 74011000250 HC RX REV CODE- 250: Performed by: NURSE PRACTITIONER

## 2019-12-02 PROCEDURE — 82803 BLOOD GASES ANY COMBINATION: CPT

## 2019-12-02 PROCEDURE — 36415 COLL VENOUS BLD VENIPUNCTURE: CPT

## 2019-12-02 PROCEDURE — 74011250636 HC RX REV CODE- 250/636: Performed by: EMERGENCY MEDICINE

## 2019-12-02 PROCEDURE — 74011000250 HC RX REV CODE- 250: Performed by: INTERNAL MEDICINE

## 2019-12-02 PROCEDURE — 77030040361 HC SLV COMPR DVT MDII -B

## 2019-12-02 PROCEDURE — 82962 GLUCOSE BLOOD TEST: CPT

## 2019-12-02 PROCEDURE — 77030011943

## 2019-12-02 PROCEDURE — 74011000258 HC RX REV CODE- 258: Performed by: EMERGENCY MEDICINE

## 2019-12-02 PROCEDURE — 74011636637 HC RX REV CODE- 636/637: Performed by: EMERGENCY MEDICINE

## 2019-12-02 PROCEDURE — 84443 ASSAY THYROID STIM HORMONE: CPT

## 2019-12-02 PROCEDURE — 74011250636 HC RX REV CODE- 250/636: Performed by: HOSPITALIST

## 2019-12-02 PROCEDURE — 97161 PT EVAL LOW COMPLEX 20 MIN: CPT

## 2019-12-02 PROCEDURE — 97116 GAIT TRAINING THERAPY: CPT

## 2019-12-02 PROCEDURE — 71045 X-RAY EXAM CHEST 1 VIEW: CPT

## 2019-12-02 PROCEDURE — 74011250637 HC RX REV CODE- 250/637: Performed by: NURSE PRACTITIONER

## 2019-12-02 PROCEDURE — 80061 LIPID PANEL: CPT

## 2019-12-02 PROCEDURE — 65610000006 HC RM INTENSIVE CARE

## 2019-12-02 PROCEDURE — 80053 COMPREHEN METABOLIC PANEL: CPT

## 2019-12-02 PROCEDURE — 74011250637 HC RX REV CODE- 250/637: Performed by: NEUROMUSCULOSKELETAL MEDICINE & OMM

## 2019-12-02 PROCEDURE — 74011636637 HC RX REV CODE- 636/637: Performed by: INTERNAL MEDICINE

## 2019-12-02 PROCEDURE — C9113 INJ PANTOPRAZOLE SODIUM, VIA: HCPCS | Performed by: INTERNAL MEDICINE

## 2019-12-02 RX ORDER — SODIUM CHLORIDE, SODIUM LACTATE, POTASSIUM CHLORIDE, CALCIUM CHLORIDE 600; 310; 30; 20 MG/100ML; MG/100ML; MG/100ML; MG/100ML
100 INJECTION, SOLUTION INTRAVENOUS CONTINUOUS
Status: DISCONTINUED | OUTPATIENT
Start: 2019-12-02 | End: 2019-12-03

## 2019-12-02 RX ORDER — BRIMONIDINE TARTRATE 2 MG/ML
1 SOLUTION/ DROPS OPHTHALMIC 2 TIMES DAILY
Status: DISCONTINUED | OUTPATIENT
Start: 2019-12-02 | End: 2019-12-04 | Stop reason: HOSPADM

## 2019-12-02 RX ORDER — PRAVASTATIN SODIUM 40 MG/1
80 TABLET ORAL
Status: DISCONTINUED | OUTPATIENT
Start: 2019-12-02 | End: 2019-12-04 | Stop reason: HOSPADM

## 2019-12-02 RX ORDER — INSULIN GLARGINE 100 [IU]/ML
30 INJECTION, SOLUTION SUBCUTANEOUS DAILY
Status: DISCONTINUED | OUTPATIENT
Start: 2019-12-02 | End: 2019-12-03

## 2019-12-02 RX ORDER — CLOPIDOGREL BISULFATE 75 MG/1
75 TABLET ORAL DAILY
Status: DISCONTINUED | OUTPATIENT
Start: 2019-12-03 | End: 2019-12-04 | Stop reason: HOSPADM

## 2019-12-02 RX ADMIN — INSULIN GLARGINE 30 UNITS: 100 INJECTION, SOLUTION SUBCUTANEOUS at 11:56

## 2019-12-02 RX ADMIN — Medication 1 AMPULE: at 21:59

## 2019-12-02 RX ADMIN — BRIMONIDINE TARTRATE 1 DROP: 2 SOLUTION OPHTHALMIC at 19:53

## 2019-12-02 RX ADMIN — POTASSIUM CHLORIDE 20 MEQ: 400 INJECTION, SOLUTION INTRAVENOUS at 01:00

## 2019-12-02 RX ADMIN — SODIUM CHLORIDE 3.9 UNITS/HR: 9 INJECTION, SOLUTION INTRAVENOUS at 10:21

## 2019-12-02 RX ADMIN — CEFEPIME 1 G: 1 INJECTION, POWDER, FOR SOLUTION INTRAMUSCULAR; INTRAVENOUS at 13:35

## 2019-12-02 RX ADMIN — SODIUM CHLORIDE, SODIUM LACTATE, POTASSIUM CHLORIDE, AND CALCIUM CHLORIDE 100 ML/HR: 600; 310; 30; 20 INJECTION, SOLUTION INTRAVENOUS at 11:58

## 2019-12-02 RX ADMIN — CEFEPIME 2 G: 20 INJECTION, POWDER, FOR SOLUTION INTRAVENOUS at 08:32

## 2019-12-02 RX ADMIN — Medication 40 ML: at 05:03

## 2019-12-02 RX ADMIN — Medication 10 ML: at 13:36

## 2019-12-02 RX ADMIN — CEFEPIME 1 G: 1 INJECTION, POWDER, FOR SOLUTION INTRAMUSCULAR; INTRAVENOUS at 21:59

## 2019-12-02 RX ADMIN — Medication 30 ML: at 22:00

## 2019-12-02 RX ADMIN — SODIUM CHLORIDE, SODIUM LACTATE, POTASSIUM CHLORIDE, AND CALCIUM CHLORIDE 100 ML/HR: 600; 310; 30; 20 INJECTION, SOLUTION INTRAVENOUS at 22:08

## 2019-12-02 RX ADMIN — PRAVASTATIN SODIUM 80 MG: 40 TABLET ORAL at 21:59

## 2019-12-02 RX ADMIN — DEXTROSE MONOHYDRATE 4 G: 25 INJECTION, SOLUTION INTRAVENOUS at 00:06

## 2019-12-02 RX ADMIN — DEXTROSE MONOHYDRATE, SODIUM CHLORIDE, AND POTASSIUM CHLORIDE: 50; 4.5; 2.98 INJECTION, SOLUTION INTRAVENOUS at 05:59

## 2019-12-02 RX ADMIN — PANTOPRAZOLE SODIUM 40 MG: 40 INJECTION, POWDER, FOR SOLUTION INTRAVENOUS at 21:59

## 2019-12-02 RX ADMIN — Medication 1 AMPULE: at 08:33

## 2019-12-02 RX ADMIN — PANTOPRAZOLE SODIUM 40 MG: 40 INJECTION, POWDER, FOR SOLUTION INTRAVENOUS at 08:32

## 2019-12-02 NOTE — PROGRESS NOTES
DW RN  Improving acidosis and AG  Hypokalemia  Resolved lactic acidosis    Change IVF to D54 with K  DC VBG  Replace K  DC bicarb drip

## 2019-12-02 NOTE — PROGRESS NOTES
Problem: Mobility Impaired (Adult and Pediatric)  Goal: *Acute Goals and Plan of Care (Insert Text)  Description  FUNCTIONAL STATUS PRIOR TO ADMISSION: Patient was independent and active without use of DME. Reports history of 1 fall over previous 6 months. States she is still driving. Does all cooking, cleaning, household chores. Just discharged home from [facility] x1 week ago and states she did not receive HH PT or additional therapy follow up. HOME SUPPORT PRIOR TO ADMISSION: The patient lived with 2 sons. Pt states one son is \"special needs\" and home with her during the day, other son works full-time and is only home at night time    Physical Therapy Goals  Initiated 12/2/2019  1. Patient will move from supine to sit and sit to supine , scoot up and down and roll side to side in bed with independence within 7 day(s). 2.  Patient will transfer from bed to chair and chair to bed with supervision/set-up using the least restrictive device within 7 day(s). 3.  Patient will perform sit to stand with supervision/set-up within 7 day(s). 4.  Patient will ambulate with supervision/set-up for 300 feet with the least restrictive device within 7 day(s). 5.  Patient will ascend/descend 12 stairs with 1 handrail(s) with supervision/set-up within 7 day(s). Outcome: Progressing Towards Goal   PHYSICAL THERAPY EVALUATION  Patient: Ac Hall (64 y.o. female)  Date: 12/2/2019  Primary Diagnosis: [facility] [E11.10]  Encephalopathy [G93.40]        Precautions:          ASSESSMENT  Based on the objective data described below, the patient presents with generalized weakness, impaired standing balance, decreased endurance/activity tolerance, intermittent confusion, and overall impaired functional mobility. Overall, pt tolerated therapy session well with VSS throughout (levophed recently stopped per RN).  Pt with slow, shuffled gait and mild increase in trunk sway however no overt LOB, requiring SB/CGAx1. Anticipate that pt will continue to progress well with therapy however discharge recommendations TBD. Pt with inability to manage diabetes/insulin at home, resulting in multiple hospital re-admissions. If pt has 24hr supervision, recommend HHPT w/ 24hr assist vs SNF. Current Level of Function Impacting Discharge (mobility/balance): SB/CGAx1 during ambulation w/o device    Functional Outcome Measure: The patient scored 55/100 on the Barthel Index outcome measure which is indicative of moderate impairment . Other factors to consider for discharge: dementia?, inability to manage DM/insulin     Patient will benefit from skilled therapy intervention to address the above noted impairments. PLAN :  Recommendations and Planned Interventions: bed mobility training, transfer training, gait training, therapeutic exercises, patient and family training/education, and therapeutic activities      Frequency/Duration: Patient will be followed by physical therapy:  4 times a week to address goals. Recommendation for discharge: (in order for the patient to meet his/her long term goals)  To be determined: HHPT w/ 24hr supervision vs SNF. This discharge recommendation:  Has not yet been discussed the attending provider and/or case management    IF patient discharges home will need the following DME: to be determined (TBD)         SUBJECTIVE:   Patient stated I don't know about these legs.     OBJECTIVE DATA SUMMARY:   HISTORY:    Past Medical History:   Diagnosis Date    Diabetes ([facility]     Heart failure ([facility]     unknown to family    Hypercholesteremia     Hypertension     Stroke [facility])     Thyroid disease      Past Surgical History:   Procedure Laterality Date    HX GYN      HX HEENT      thyroidectomy    HX HYSTERECTOMY      REMOVAL GALLBLADDER      THYROIDECTOMY         Personal factors and/or comorbidities impacting plan of care: DM and inability to manage insulin    Home Situation  Home Environment: Private residence  # Steps to Enter: 4  Rails to Enter: Yes  [employer] : Right  One/Two Story Residence: Two story  # of Interior Steps: 12  Interior Rails: Right  Lift Chair Available: No  Living Alone: No  Support Systems: Child(oumar)  Patient Expects to be Discharged to[de-identified] Private residence  Current DME Used/Available at Home: None    EXAMINATION/PRESENTATION/DECISION MAKING:   Critical Behavior:  Neurologic State: Alert  Orientation Level: Oriented X4  Cognition: Follows commands     Hearing: Auditory  Auditory Impairment: None  Skin:  intact  Edema: none noted   Range Of Motion:  AROM: Within functional limits                       Strength:    Strength: Generally decreased, functional                    Tone & Sensation:   Tone: Normal              Sensation: Intact               Coordination:  Coordination: Within functional limits  Vision:      Functional Mobility:  Bed Mobility:  Rolling: Supervision  Supine to Sit: Supervision     Scooting: Supervision  Transfers:  Sit to Stand: Stand-by assistance  Stand to Sit: Stand-by assistance        Bed to Chair: Contact guard assistance              Balance:   Sitting: Intact  Standing: Impaired  Standing - Static: Good  Standing - Dynamic : Fair  Ambulation/Gait Training:  Distance (ft): 50 Feet (ft)(25ft x2 w/ seated rest break b/t)  Assistive Device: Gait belt  Ambulation - Level of Assistance: Contact guard assistance        Gait Abnormalities: Decreased step clearance;Shuffling gait        Base of Support: Narrowed     Speed/Deepika: Shuffled  Step Length: Left shortened;Right shortened                     Functional Measure:  Barthel Index:    Bathin  Bladder: 10  Bowels: 10  Groomin  Dressin  Feeding: 10  Mobility: 0  Stairs: 0  Toilet Use: 5  Transfer (Bed to Chair and Back): 10  Total: 55/100       The Barthel ADL Index: Guidelines  1.  The index should be used as a record of what a patient does, not as a record of what a patient could do. 2. The main aim is to establish degree of independence from any help, physical or verbal, however minor and for whatever reason. 3. The need for supervision renders the patient not independent. 4. A patient's performance should be established using the best available evidence. Asking the patient, friends/relatives and nurses are the usual sources, but direct observation and common sense are also important. However direct testing is not needed. 5. Usually the patient's performance over the preceding 24-48 hours is important, but occasionally longer periods will be relevant. 6. Middle categories imply that the patient supplies over 50 per cent of the effort. 7. Use of aids to be independent is allowed. Max Kerr., Barthel, DEliW. (3221). Functional evaluation: the Barthel Index. [facility] (14)2. Nadine Cowan mathieu RAMO DiehlF, Lisa Marcus., Fausto Garzon., [place], [address] [place] [address] (1999). Measuring the change indisability after inpatient rehabilitation; comparison of the responsiveness of the Barthel Index and Functional [place] Measure. Journal of Neurology, Neurosurgery, and Psychiatry, 66(4), 416-952. Erik Robb, N.J.A, BRENNA Mckay, & Kristen Vázquez MJOSHUA. (2004.) Assessment of post-stroke quality of life in cost-effectiveness studies: The usefulness of the Barthel Index and the EuroQoL-5D.  Quality of Life Research, 15, 215-71           Physical Therapy Evaluation Charge Determination   History Examination Presentation Decision-Making   MEDIUM  Complexity : 1-2 comorbidities / personal factors will impact the outcome/ POC  MEDIUM Complexity : 3 Standardized tests and measures addressing body structure, function, activity limitation and / or participation in recreation  MEDIUM Complexity : Evolving with changing characteristics  MEDIUM Complexity : FOTO score of 26-74      Based on the above components, the patient evaluation is determined to be of the following complexity level: MEDIUM    Pain Rating:  Denied complaints of pain    Activity Tolerance:   VSS on RA throughout   Please refer to the flowsheet for vital signs taken during this treatment. After treatment patient left in no apparent distress:   Sitting in chair, Call bell within reach, and Bed / chair alarm activated    COMMUNICATION/EDUCATION:   The patients plan of care was discussed with: Registered Nurse. Fall prevention education was provided and the patient/caregiver indicated understanding., Patient/family have participated as able in goal setting and plan of care. , and Patient/family agree to work toward stated goals and plan of care.     Thank you for this referral.  Joanna Glass, PT, DPT   Time Calculation: 22 mins

## 2019-12-02 NOTE — PROGRESS NOTES
12/2/2019    INTENSIVIST PROGRESS NOTE:     Patient seen and evaluated, chart reviewed   68 yo female presented with weakness, malaise  Found to have DKA, septic shock, cellulitis  Started on IV abx, insulin drip, bicarb drip  Now pt in CCU critically ill    12/2:  Feels well  Gap closed   Says she just forgets to take her insulin at home  Off pressors    ROS: unable to obtain    Visit Vitals  BP (!) 120/22   Pulse 95   Temp 98.4 °F (36.9 °C)   Resp 23   Ht 5' 4\" (1.626 m)   Wt 74.6 kg (164 lb 7.4 oz)   SpO2 100%   Breastfeeding No   BMI 28.23 kg/m²       General: weak, debilitated  Eyes: anicteric  HEENT: dry oral mucosa  Neck: FROM  CV: RRR tachy  Lungs: soft  Abd: soft  : no flank pain  Ext: no edema  Skin: no rash  Musculoskeletal: normal inspection  Neuro: alert  CXR: clear    Labs reviewed    A/P:  - DKA: stop insulin drip, start glargine/SSI  - DC D5, change to LR  - off pressors  - severe sepsis: IV abx. Stop levofloxacin  - ONEYDA: IVF.  Better  - DVT prophylaxis  - start diabetic diet  - CM consult given difficult home situation, recurrent admissions  - PT/OT  - Will assist on disposition planning when stable for transfer--may be able to leave the ICU later today  [place] DO

## 2019-12-02 NOTE — PROGRESS NOTES
0 - Report received from VIN Carlos.    0800 - Assessment completed. Patient alert and oriented x 4. On levophed at 5mcg/min. On insulin drip and glucostabilizer. Lung sounds clear bilaterally. Is able to help with turning. 1112 - Levophed turned off. Patient systolic >14.    6284 - 30 units insulin Glargine given will discontinue Insulin drip in 2 hours. 1158 - D5 1/2 NS with 40K discontinued and LR started at 100 ml/hr. 1200 - Reassessment completed. No changes. 1349 - Insulin drip stopped per order. 1521 - Reassessment. Completed. No changes noted. Report given to Israel Rose RN.

## 2019-12-02 NOTE — PROGRESS NOTES
Pharmacy Automatic Renal Dosing Protocol - Antimicrobials    Indication for Antimicrobials: sepsis, possible cellulitis     Current Regimen of Each Antimicrobial:  Cefepime 2g iv q24h (Start Date ; day 2)  Vancomycin 1000 mg q36h(start , day 2)    Previous Antimicrobial Therapy:   Levofloxacin 750 mg iv q48h (start date ; day 1)  Last admission ceftriaxone -    Goal Level: VANCOMYCIN TROUGH GOAL RANGE    Vancomycin Trough: 10 - 15 mcg/mL  (AUC: 400 - 600 mg/hr/Liter/day)     Date Dose & Interval Measured (mcg/mL) Extrapolated (mcg/mL)                       Date & time of next level: before 3rd maintenance dose ( 2100)      Significant Cultures:    blood cx: NGTD, pending  (last admission  blood and urine cx negative)    Radiology / Imaging results: (X-ray, CT scan or MRI):    CT abd: Deep soft tissue gas in the left arm suspicious for possible  cellulitis. No acute intra-abdominal findings with incidental findings as above. Paralysis, amputations, malnutrition: none noted    Labs:  Recent Labs     19  1017 19  0406 19  0922   CREA 0.94 0.97 1.39*   < > 1.91*   BUN 20 23* 32*   < > 44*   WBC  --  17.1*  --   --  21.3*    < > = values in this interval not displayed. Temp (24hrs), Av.5 °F (36.4 °C), Min:95.8 °F (35.4 °C), Max:98.2 °F (36.8 °C)    Creatinine Clearance (mL/min) or Dialysis: 43.3 mL/min (IBW)     Impression/Plan:   Scr improved, WBC trending down  Levofloxacin d/c  Increase cefepime to 1g q8h  Increase vancomycin to 1000 mg q18h  Vancomycin trough before 3rd maintenance dose  BMP, CBC  Antimicrobial stop date TBD     Pharmacy will follow daily and adjust medications as appropriate for renal function and/or serum levels.     Thank you,  YOUSIF Banda

## 2019-12-02 NOTE — DIABETES MGMT
DTC note:  COnsult received for assessment of home management. Recommed continuing insulin gtt until pt ahs 2 consecutive anion gap levels less than 12 then transition to home regimen, giving basal insulin 2 hours prior to stopping IV insulin and D5 IVF. DTC will f/u for consult.   Thank you,  Joseph Caldwell, MAJORN, RN, [address]

## 2019-12-02 NOTE — PROGRESS NOTES
Hospitalist Progress Note    NAME: Yessenia Scott   :  1940   MRN:  [id number]     I reviewed pertinent labs/imaging and discussed plan of care with Dr. Harrison Ramos who is in agreement. Assessment / Plan:  ICU team input/managment appreciated    Poorly controlled IDDM  DKA  Hgb A1C >14  - Anion gap closed since 19 at 1631.  - Transition to SC insulin. - Continue to monitor BMP. - Continue IVF (currently LR at 100 cc/hr). Leucocytosis  Lactic acidosis   Septic shock  CXR 19:  Stable appearance of the chest.   CT abdomen/pelvis 19:  Deep soft tissue gas in the left arm suspicious for possible cellulitis. No acute intra-abdominal findings with incidental findings as above. LUE venous ultrasound 19:  · No evidence of deep vein thrombosis in the left upper extremity. Left upper extremity veins were visualized in the transverse and longitudinal views. The vessels showed normal color filling and compressibility. Doppler interrogation showed phasic and spontaneous flow. · No evidence of acute DVT in the left upper extremity. · Limited visualization due to left arm bandage. Blood culture 19 at 0947:  NG at 21 hours. - Off pressors currently. - Repeat LA in a.m.  - Continue current abx (vancomycin/cefepime both day #2). Anemia  - Likely a dilutional component.   - Guaiac stool.  - Monitor. ONEYDA, resolved  - Continue hydration.  - Continue to avoid nephrotoxins. 25.0 - 29.9 Overweight / Body mass index is 28.23 kg/m². Code status: Full  Prophylaxis: SCD's  Recommended Disposition: Home w/Family     Subjective:     Chief Complaint / Reason for Physician Visit  No complaints. Plan of care reviewed with RN.     Review of Systems:  Symptom Y/N Comments  Symptom Y/N Comments   Fever/Chills N   Chest Pain N    Poor Appetite N   Edema Y    Cough N   Abdominal Pain N    Sputum N   Joint Pain N    SOB/AGUILERA N   Pruritis/Rash N    Nausea/vomit N   Tolerating PT/OT Diarrhea N   Tolerating Diet Y    Constipation N   Other       Could NOT obtain due to:      Objective:     VITALS:   Last 24hrs VS reviewed since prior progress note.  Most recent are:  Patient Vitals for the past 24 hrs:   Temp Pulse Resp BP SpO2   12/02/19 1200  95 23 (!) 120/22 100 %   12/02/19 1100  91 22 (!) 110/34 100 %   12/02/19 1000  95 22 (!) 126/31 100 %   12/02/19 0900  97 21 (!) 126/37 100 %   12/02/19 0800 98.4 °F (36.9 °C) 96 21 (!) 128/32 100 %   12/02/19 0700  97 24 141/44 100 %   12/02/19 0630  97 23 (!) 139/39 100 %   12/02/19 0615  100 17 152/88 100 %   12/02/19 0600  93 24 (!) 118/30 99 %   12/02/19 0530  93 22 (!) 142/32 100 %   12/02/19 0515  96 21 113/75 99 %   12/02/19 0500  90 22 (!) 123/35 98 %   12/02/19 0445  90 21 (!) 125/36 99 %   12/02/19 0430  92 20 (!) 128/37 99 %   12/02/19 0400 98.2 °F (36.8 °C) 92 21 (!) 127/35 99 %   12/02/19 0300  (!) 101 20 127/51 99 %   12/02/19 0230  91 21 (!) 127/37 98 %   12/02/19 0200  (!) 102 24 164/44 98 %   12/02/19 0130  92 19 (!) 134/36 97 %   12/02/19 0100  91 20 (!) 128/37 97 %   12/02/19 0030  92 20 (!) 133/39 97 %   12/02/19 0000 97.7 °F (36.5 °C) 97 22 146/45 97 %   12/01/19 2300  95 24 145/43 99 %   12/01/19 2230  94 22 139/41 99 %   12/01/19 2200  98 20 160/46 99 %   12/01/19 2130  90 19 150/42 99 %   12/01/19 2100  94 23 147/41 98 %   12/01/19 2030  90 20 159/45 99 %   12/01/19 2000  90 21 (!) 151/39 99 %   12/01/19 1930  92 19 152/51 100 %   12/01/19 1900 97.7 °F (36.5 °C) 90 18 146/41 100 %   12/01/19 1830  89 26 148/47 100 %   12/01/19 1800  88 24 (!) 146/35 100 %   12/01/19 1745  87 22 137/40 100 %   12/01/19 1730  85 23 (!) 131/36 100 %   12/01/19 1715  84 27 (!) 130/38 100 %   12/01/19 1700  83 26 (!) 129/37 100 %   12/01/19 1647  84 17  100 %   12/01/19 1630  82 21 (!) 124/37 100 %   12/01/19 1615  83 27 (!) 117/38 100 %   12/01/19 1610  85 23 (!) 121/36 100 %   12/01/19 1600  86 24 (!) 120/38 100 %   12/01/19 1550  91 26 (!) 130/37 100 %   12/01/19 1547  94 22 131/40 100 %   12/01/19 1530 95.8 °F (35.4 °C) 96 19 142/42 100 %   12/01/19 1524  99 25 137/42    12/01/19 1500  95 22 140/41 100 %   12/01/19 1445  94 22 141/41 100 %   12/01/19 1430  95 28 140/42 100 %   12/01/19 1425 97.9 °F (36.6 °C) 95 26 143/42 100 %   12/01/19 1420  96 26 145/42 100 %   12/01/19 1415  96 29 146/44 100 %   12/01/19 1405  96 22 140/41 100 %   12/01/19 1400  93 22 135/43 100 %   12/01/19 1355  93 21 (!) 134/39 100 %   12/01/19 1350  93 24 141/42 100 %   12/01/19 1345  96 21 140/41 100 %   12/01/19 1340  95 20 129/56 100 %   12/01/19 1335  94 28 137/45 100 %   12/01/19 1330  93 (!) 31 142/45 100 %       Intake/Output Summary (Last 24 hours) at 12/2/2019 1322  Last data filed at 12/2/2019 0600  Gross per 24 hour   Intake 4765.8 ml   Output 1000 ml   Net 3765.8 ml        PHYSICAL EXAM:  General:  A/A/O X 3. NAD. Weak. HEENT:  Normocephalic. Sclera anicteric. EOMI. Mucous membranes dry. Chest:  Resps even/unlabored with symmetrical CWE. Air entry full. Lungs CTA. No use of accessory muscles. CV:  RRR. Trace pretibial edema peter. LUE 1 mm edema. GI:  Abdomen soft/NT/ND. ABT X 4.    Neurologic:  Nonfocal.  CN II-XII grossly intact. Speech normal.     Psych:  Cooperative. No anxiety or agitation. Skin:  No rashes or jaundice. Reviewed most current lab test results and cultures  YES  Reviewed most current radiology test results   YES  Review and summation of old records today    NO  Reviewed patient's current orders and MAR    YES  PMH/SH reviewed - no change compared to H&P  ________________________________________________________________________  Care Plan discussed with:    Comments   Patient [address] [place]     Consultant                        Multidiciplinary team rounds were held today with , nursing, pharmacist and clinical coordinator. Patient's plan of care was discussed; medications were reviewed and discharge planning was addressed. ________________________________________________________________________  Keith Galvin NP     Procedures: see electronic medical records for all procedures/Xrays and details which were not copied into this note but were reviewed prior to creation of Plan. LABS:  I reviewed today's most current labs and imaging studies. Pertinent labs include:  Recent Labs     12/02/19  0406 12/01/19  0922   WBC 17.1* 21.3*   HGB 9.8* 10.6*   HCT 28.7* 34.7*    443*     Recent Labs     12/02/19  1017 12/02/19  0406 12/01/19 2010 12/01/19  1203 12/01/19  0922    142 140   < > 131* 126*   K 4.2 3.8 2.9*   < > 5.0 5.8*    109* 105   < > 95* 87*   CO2 24 24 20*   < > 5* <5*   * 117* 256*   < > 854* 925*   BUN 20 23* 32*   < > 44* 44*   CREA 0.94 0.97 1.39*   < > 2.17* 1.91*   CA 7.3* 7.6* 7.6*   < > 7.9* 8.7   MG 1.7 1.8 1.8   < > 2.4 2.5*   PHOS  --   --   --   --  9.3*  --    ALB  --  2.3*  --   --   --  3.1*   TBILI  --  0.5  --   --   --  0.6   SGOT  --  30  --   --   --  35   ALT  --  28  --   --   --  38   INR  --   --   --   --  1.0  --     < > = values in this interval not displayed.        Signed: Keith Galvin NP

## 2019-12-02 NOTE — PROGRESS NOTES
Pharmacy Medication Reconciliation     The patient was interviewed regarding current PTA medication list, use and drug allergies. The patient was questioned regarding use of any other inhalers, topical products, over the counter medications, herbal medications, vitamin products or ophthalmic/nasal/otic medication use. Allergy Update: Patient has no known allergies. Recommendations/Findings: The following amendments were made to the patient's active medication list on file at [facility]:   1) Additions: none    2) Deletions: none    3) Changes: insulin degludec decreased to 16 units from 20 units      Pertinent Findings:   Patient does not always remember to taker her insulin. She does think the insulin pen is a good method of insulin administration for her when she remembers to use it. She did not know all the names of her medications, but did recognize them and reported taking medications listed. All medications filled recently at [facility].    -Clarified PTA med list with patient, RX query. PTA medication list was corrected to the following:       Prior to Admission Medications   Prescriptions Last Dose Informant Taking? amLODIPine (NORVASC) 10 mg tablet  Self Yes   Sig: TAKE 1 TABLET BY MOUTH EVERY DAY IN THE MORNING   brimonidine (ALPHAGAN) 0.15 % ophthalmic solution  Self Yes   Sig: Administer 1 Drop to both eyes two (2) times a day. clopidogrel (PLAVIX) 75 mg tab  Self Yes   Sig: TAKE 1 TAB BY MOUTH DAILY. insulin aspart U-100 (NOVOLOG) 100 unit/mL (3 mL) inpn  Self Yes   Si units AC breakfast,lunch, and dinner   insulin degludec (TRESIBA FLEXTOUCH U-100) 100 unit/mL (3 mL) inpn  Self Yes   Si Units by SubCUTAneous route daily. levothyroxine (SYNTHROID) 175 mcg tablet  Self Yes   Sig: TAKE 1 TABLET BY MOUTH EVERY DAY   metoprolol succinate (TOPROL-XL) 25 mg XL tablet  Self Yes   Sig: Take 1 Tab by mouth daily.    pravastatin (PRAVACHOL) 80 mg tablet  Self Yes   Sig: TAKE 1 TABLET BY MOUTH EVERY DAY      Facility-Administered Medications: None          Thank you,  YOUSIF Sams

## 2019-12-02 NOTE — PROGRESS NOTES
Nutrition Assessment:    RECOMMENDATIONS:   Continue Diabetic diet as tolerated    DIETITIANS INTERVENTIONS/PLAN:   Continue diet  Monitor appetite/PO intake  Monitor BG    ASSESSMENT:   Pt admitted with DKA. PMH: CVA, HTN, DM, dementia, recently discharge. Chart reviewed, case discussed during CCU rounds. MST triggered for no wt loss, unsure of amount, no poor appetite. Diet advanced this morning, plans to transition off of insulin drip. DM very poorly managed, pt not taking her insulin at home. Case management consulted as pt lives at home, has dementia and is noncompliant with medication. HgbA1c >14. Will monitor her appetite. Wt has been stable. SUBJECTIVE/OBJECTIVE:     Diet Order: Consistent carb, Other (comment)(2gNa )  % Eaten:  No data found. Pertinent Medications:cefepime, lantus, protonix, KCl, vancomycin; IVF(D5, [email or web address]); Drips: insulin    I/O's: +3.7L    Chemistries:  Lab Results   Component Value Date/Time    Sodium 141 12/02/2019 10:17 AM    Potassium 4.2 12/02/2019 10:17 AM    Chloride 108 12/02/2019 10:17 AM    CO2 24 12/02/2019 10:17 AM    Anion gap 9 12/02/2019 10:17 AM    Glucose 255 (H) 12/02/2019 10:17 AM    BUN 20 12/02/2019 10:17 AM    Creatinine 0.94 12/02/2019 10:17 AM    BUN/Creatinine ratio 21 (H) 12/02/2019 10:17 AM    GFR est AA >60 12/02/2019 10:17 AM    GFR est non-AA 58 (L) 12/02/2019 10:17 AM    Calcium 7.3 (L) 12/02/2019 10:17 AM    AST (SGOT) 30 12/02/2019 04:06 AM    Alk. phosphatase 81 12/02/2019 04:06 AM    Protein, total 4.9 (L) 12/02/2019 04:06 AM    Albumin 2.3 (L) 12/02/2019 04:06 AM    Globulin 2.6 12/02/2019 04:06 AM    A-G Ratio 0.9 (L) 12/02/2019 04:06 AM    ALT (SGPT) 28 12/02/2019 04:06 AM      Anthropometrics: Height: 5' 4\" (162.6 cm) Weight: 74.6 kg (164 lb 7.4 oz)  [] standing scale     [x]bed scale (12/1)   []stated   []unknown    IBW (%IBW):   ( ) UBW (%UBW):   (  %)    BMI: Body mass index is 28.23 kg/m².     This BMI is indicative of:  []Underweight   []Normal   [x]Overweight   [] Obesity   [] Extreme Obesity (BMI>40)  Estimated Nutrition Needs (Based on): 1580 Kcals/day(MSJ 1216 x 1.3) , 60 g(-75 (0.8-1gPro/kg) ) Protein  Carbohydrate: At Least 130 g/day  Fluids: 1600 mL/day    Last BM: 12/2   [x]Active     []Hyperactive  []Hypoactive       [] Absent   BS  Skin:    [x] Intact   [] Incision  [] Breakdown   [] DTI   [] Tears/Excoriation/Abrasion  [x]Edema(nonpitting-LUE; +2-BLE) [] Other: Wt Readings from Last 30 Encounters:   12/01/19 74.6 kg (164 lb 7.4 oz)   11/26/19 76.8 kg (169 lb 4.8 oz)   11/21/19 73.9 kg (163 lb)   11/05/19 74.5 kg (164 lb 3.2 oz)   10/15/19 73.7 kg (162 lb 8 oz)   07/09/19 74.5 kg (164 lb 4.8 oz)   07/02/19 73.6 kg (162 lb 4.8 oz)   06/18/19 74.7 kg (164 lb 9.6 oz)   06/11/19 75.8 kg (167 lb)   05/28/19 72.6 kg (160 lb 1.6 oz)   04/04/19 74.5 kg (164 lb 4.8 oz)   02/12/19 73.8 kg (162 lb 12.8 oz)   01/08/19 75.9 kg (167 lb 6.4 oz)   12/04/18 76.6 kg (168 lb 14.4 oz)   11/15/18 78.4 kg (172 lb 14.4 oz)   11/08/18 76.8 kg (169 lb 5 oz)   11/03/18 77.1 kg (170 lb)   11/01/18 79.3 kg (174 lb 14.4 oz)   10/21/18 81 kg (178 lb 9.2 oz)   10/15/18 76.1 kg (167 lb 12.8 oz)   10/09/18 78.9 kg (173 lb 15.1 oz)   09/09/18 77.1 kg (170 lb)   09/06/18 77.1 kg (170 lb)   08/27/18 68 kg (150 lb)   08/24/18 74.8 kg (164 lb 12.8 oz)   08/21/18 74.4 kg (164 lb)   08/09/18 74.6 kg (164 lb 7.4 oz)   07/10/18 74.6 kg (164 lb 6.4 oz)   12/25/17 75.5 kg (166 lb 7.2 oz)   12/21/17 75.5 kg (166 lb 6.4 oz)      NUTRITION DIAGNOSES:   Problem:  Altered nutrition-related lab values      Etiology: related to dementia + insulin noncompliance     Signs/Symptoms: as evidenced by hgba1c >14, DKA admission. NUTRITION INTERVENTIONS:  Meals/Snacks: General/healthful diet       Nutrition-Related Medication Mgmt: Other (comment)          GOAL:   Pt will consume >50% of meals with BG <200mg/dL in 3-5 days.      Cultural, Scientology, or Ethnic Dietary Needs: None     LEARNING NEEDS (Diet, Food/Nutrient-Drug Interaction):    [] None Identified   [] Identified and Education Provided/Documented   [x] Identified and Pt declined/was not appropriate      [x] Interdisciplinary Care Plan Reviewed/Documented    [x] Participated in Discharge Planning: Diabetic diet    [x] Interdisciplinary Rounds     NUTRITION RISK:    [] High              [x] Moderate           []  Low  []  Minimal/Uncompromised    PT SEEN FOR:    []  MD Consult: []Calorie Count      []Diabetic Diet Education        []Diet Education     []Electrolyte Management     []General Nutrition Management and Supplements     []Management of Tube Feeding     []TPN Recommendations    [x]  RN Referral:  [x]MST score >=2     []Enteral/Parenteral Nutrition PTA     []Pregnant: Gestational DM or Multigestation   []  Low BMI  []  Re-Screen   []  LOS   []  NPO/clears x 5 days   []  New TF/TPN    Elva Bean RD, [address]    Pager [phone]  Weekend Pager [phone]

## 2019-12-02 NOTE — DIABETES MGMT
[facility]    [facility] Consult Note    Recommendations/ Comments: Monitor BG over night. Pt likely will need decreased lantus and addition of prandial humalog. Consider decreasing lantus to 16 units and beginning humalog 4 units ac tid if po intake increases. Pt had minimal po intake at lunch due to not having her teeth. Current hospital DM medication: lantus 30 units daily and humalog correction    Met with pt and pt's dtr for consult. Pt reports her overall noncompliance is due to forgetting to take her insulin and not eating properly. Discussed a1c value and goal, BG goals. Discussed actions/timing of insulin and why both are important. Discussed ideas for remembering to take her insulin like setting timers, insulin on table where eating or placing pen needles in pill boxes. Pt reports compliance with her insulin over the last week, however, pt's insulin pens are likely  due to previous noncompliance. Enc pt to throw those pens out and open new pens once arriving home. Discussed basic meal planning using plate method and used pt's lunch and typical breakfast for examples. Pt tends to skip lunch. Enc pt to eat 3 meals daily and better balance plate to help stabilize BG. Consult received for:  [x]             Assessment of home management                []      Medication Recommendations                []             Meter/monitoring     []             Insulin instruction     []             New diagnosis     []             Outpatient education     []             Insulin pump patient     []             Insulin infusion     []             DKA/HHS    Chart reviewed and initial evaluation complete on Deana Chase. Patient is a 69 y.o. female with known Type 1 Diabetes on tresiba 16 units daily and novolog 4 units ac tid at home.     Assessed and instructed patient on the following:   ·  interpretation of lab results, blood sugar goals, hypoglycemia prevention and treatment, nutrition, referred to Diabetes Educator and site rotation      Provided patient with the following: [x]             Diabetes Self Care Guide               [x]             Insulin education materials               []             CHO counting education materials               [x]             Outpatient DTC contact number               []             Glucometer               Patient was able to give return demonstration of    []       Glucometer    []       saline injection      with []     without []       assistance needed. []       Nurse to have patient self inject prior to discharge. Discussed with patient and/or family need for follow up appointment for diabetes management after discharge. A1c:   Lab Results   Component Value Date/Time    Hemoglobin A1c >14.0 (H) 12/01/2019 12:03 PM       Recent Glucose Results:   Lab Results   Component Value Date/Time     (H) 12/02/2019 10:17 AM     (H) 12/02/2019 04:06 AM     (H) 12/01/2019 08:10 PM    GLUCPOC 147 (H) 12/02/2019 01:33 PM    GLUCPOC 189 (H) 12/02/2019 12:26 PM    GLUCPOC 220 (H) 12/02/2019 11:16 AM        Lab Results   Component Value Date/Time    Creatinine 0.94 12/02/2019 10:17 AM     Estimated Creatinine Clearance: 49.6 mL/min (based on SCr of 0.94 mg/dL). Active Orders   Diet    DIET DIABETIC CONSISTENT CARB Regular; 2 GM NA (House Low NA)        PO intake: No data found. Will continue to follow as needed. Thank you.     ÓSCAR Alegre, RN, [address]

## 2019-12-03 LAB
ANION GAP SERPL CALC-SCNC: 6 MMOL/L (ref 5–15)
BASOPHILS # BLD: 0 K/UL (ref 0–0.1)
BASOPHILS NFR BLD: 0 % (ref 0–1)
BUN SERPL-MCNC: 13 MG/DL (ref 6–20)
BUN/CREAT SERPL: 17 (ref 12–20)
CALCIUM SERPL-MCNC: 7.1 MG/DL (ref 8.5–10.1)
CHLORIDE SERPL-SCNC: 110 MMOL/L (ref 97–108)
CO2 SERPL-SCNC: 26 MMOL/L (ref 21–32)
CREAT SERPL-MCNC: 0.77 MG/DL (ref 0.55–1.02)
DIFFERENTIAL METHOD BLD: ABNORMAL
EOSINOPHIL # BLD: 0 K/UL (ref 0–0.4)
EOSINOPHIL NFR BLD: 0 % (ref 0–7)
ERYTHROCYTE [DISTWIDTH] IN BLOOD BY AUTOMATED COUNT: 14.8 % (ref 11.5–14.5)
GLUCOSE BLD STRIP.AUTO-MCNC: 100 MG/DL (ref 65–100)
GLUCOSE BLD STRIP.AUTO-MCNC: 102 MG/DL (ref 65–100)
GLUCOSE BLD STRIP.AUTO-MCNC: 111 MG/DL (ref 65–100)
GLUCOSE BLD STRIP.AUTO-MCNC: 242 MG/DL (ref 65–100)
GLUCOSE BLD STRIP.AUTO-MCNC: 72 MG/DL (ref 65–100)
GLUCOSE BLD STRIP.AUTO-MCNC: 72 MG/DL (ref 65–100)
GLUCOSE SERPL-MCNC: 64 MG/DL (ref 65–100)
HCT VFR BLD AUTO: 28.7 % (ref 35–47)
HGB BLD-MCNC: 9.7 G/DL (ref 11.5–16)
IMM GRANULOCYTES # BLD AUTO: 0 K/UL (ref 0–0.04)
IMM GRANULOCYTES NFR BLD AUTO: 0 % (ref 0–0.5)
LACTATE SERPL-SCNC: 1.6 MMOL/L (ref 0.4–2)
LYMPHOCYTES # BLD: 1.2 K/UL (ref 0.8–3.5)
LYMPHOCYTES NFR BLD: 14 % (ref 12–49)
MCH RBC QN AUTO: 31.8 PG (ref 26–34)
MCHC RBC AUTO-ENTMCNC: 33.8 G/DL (ref 30–36.5)
MCV RBC AUTO: 94.1 FL (ref 80–99)
MONOCYTES # BLD: 0.8 K/UL (ref 0–1)
MONOCYTES NFR BLD: 10 % (ref 5–13)
NEUTS SEG # BLD: 6.3 K/UL (ref 1.8–8)
NEUTS SEG NFR BLD: 76 % (ref 32–75)
NRBC # BLD: 0.02 K/UL (ref 0–0.01)
NRBC BLD-RTO: 0.2 PER 100 WBC
PHOSPHATE SERPL-MCNC: 1.3 MG/DL (ref 2.6–4.7)
PLATELET # BLD AUTO: 262 K/UL (ref 150–400)
PMV BLD AUTO: 9 FL (ref 8.9–12.9)
POTASSIUM SERPL-SCNC: 3.4 MMOL/L (ref 3.5–5.1)
PROCALCITONIN SERPL-MCNC: 0.62 NG/ML
RBC # BLD AUTO: 3.05 M/UL (ref 3.8–5.2)
SERVICE CMNT-IMP: ABNORMAL
SERVICE CMNT-IMP: NORMAL
SODIUM SERPL-SCNC: 142 MMOL/L (ref 136–145)
WBC # BLD AUTO: 8.4 K/UL (ref 3.6–11)

## 2019-12-03 PROCEDURE — 74011000250 HC RX REV CODE- 250: Performed by: INTERNAL MEDICINE

## 2019-12-03 PROCEDURE — 74011250637 HC RX REV CODE- 250/637: Performed by: NURSE PRACTITIONER

## 2019-12-03 PROCEDURE — 82962 GLUCOSE BLOOD TEST: CPT

## 2019-12-03 PROCEDURE — 74011250637 HC RX REV CODE- 250/637: Performed by: NEUROMUSCULOSKELETAL MEDICINE & OMM

## 2019-12-03 PROCEDURE — 74011000258 HC RX REV CODE- 258: Performed by: INTERNAL MEDICINE

## 2019-12-03 PROCEDURE — 97165 OT EVAL LOW COMPLEX 30 MIN: CPT

## 2019-12-03 PROCEDURE — 84100 ASSAY OF PHOSPHORUS: CPT

## 2019-12-03 PROCEDURE — 36415 COLL VENOUS BLD VENIPUNCTURE: CPT

## 2019-12-03 PROCEDURE — 74011636637 HC RX REV CODE- 636/637: Performed by: NURSE PRACTITIONER

## 2019-12-03 PROCEDURE — 83605 ASSAY OF LACTIC ACID: CPT

## 2019-12-03 PROCEDURE — 84145 PROCALCITONIN (PCT): CPT

## 2019-12-03 PROCEDURE — 85025 COMPLETE CBC W/AUTO DIFF WBC: CPT

## 2019-12-03 PROCEDURE — 80048 BASIC METABOLIC PNL TOTAL CA: CPT

## 2019-12-03 PROCEDURE — 97535 SELF CARE MNGMENT TRAINING: CPT

## 2019-12-03 PROCEDURE — 65270000029 HC RM PRIVATE

## 2019-12-03 PROCEDURE — 74011250636 HC RX REV CODE- 250/636: Performed by: INTERNAL MEDICINE

## 2019-12-03 PROCEDURE — 97116 GAIT TRAINING THERAPY: CPT

## 2019-12-03 PROCEDURE — 97110 THERAPEUTIC EXERCISES: CPT

## 2019-12-03 RX ORDER — INSULIN LISPRO 100 [IU]/ML
INJECTION, SOLUTION INTRAVENOUS; SUBCUTANEOUS EVERY 6 HOURS
Status: DISCONTINUED | OUTPATIENT
Start: 2019-12-04 | End: 2019-12-03

## 2019-12-03 RX ORDER — INSULIN LISPRO 100 [IU]/ML
INJECTION, SOLUTION INTRAVENOUS; SUBCUTANEOUS
Status: DISCONTINUED | OUTPATIENT
Start: 2019-12-04 | End: 2019-12-04 | Stop reason: HOSPADM

## 2019-12-03 RX ORDER — POTASSIUM CHLORIDE 750 MG/1
20 TABLET, FILM COATED, EXTENDED RELEASE ORAL
Status: COMPLETED | OUTPATIENT
Start: 2019-12-03 | End: 2019-12-03

## 2019-12-03 RX ORDER — DEXTROSE 50 % IN WATER (D50W) INTRAVENOUS SYRINGE
12.5-25 AS NEEDED
Status: DISCONTINUED | OUTPATIENT
Start: 2019-12-03 | End: 2019-12-04 | Stop reason: HOSPADM

## 2019-12-03 RX ORDER — INSULIN GLARGINE 100 [IU]/ML
20 INJECTION, SOLUTION SUBCUTANEOUS DAILY
Status: DISCONTINUED | OUTPATIENT
Start: 2019-12-03 | End: 2019-12-04 | Stop reason: HOSPADM

## 2019-12-03 RX ORDER — INSULIN LISPRO 100 [IU]/ML
INJECTION, SOLUTION INTRAVENOUS; SUBCUTANEOUS
Status: DISCONTINUED | OUTPATIENT
Start: 2019-12-03 | End: 2019-12-03

## 2019-12-03 RX ORDER — PANTOPRAZOLE SODIUM 40 MG/1
40 TABLET, DELAYED RELEASE ORAL
Status: DISCONTINUED | OUTPATIENT
Start: 2019-12-04 | End: 2019-12-04 | Stop reason: HOSPADM

## 2019-12-03 RX ORDER — MAGNESIUM SULFATE 100 %
4 CRYSTALS MISCELLANEOUS AS NEEDED
Status: DISCONTINUED | OUTPATIENT
Start: 2019-12-03 | End: 2019-12-04 | Stop reason: HOSPADM

## 2019-12-03 RX ADMIN — CEFEPIME 1 G: 1 INJECTION, POWDER, FOR SOLUTION INTRAMUSCULAR; INTRAVENOUS at 05:27

## 2019-12-03 RX ADMIN — CEFEPIME 1 G: 1 INJECTION, POWDER, FOR SOLUTION INTRAMUSCULAR; INTRAVENOUS at 21:18

## 2019-12-03 RX ADMIN — VANCOMYCIN HYDROCHLORIDE 1000 MG: 1 INJECTION, POWDER, LYOPHILIZED, FOR SOLUTION INTRAVENOUS at 07:13

## 2019-12-03 RX ADMIN — Medication 10 ML: at 21:18

## 2019-12-03 RX ADMIN — INSULIN LISPRO 2 UNITS: 100 INJECTION, SOLUTION INTRAVENOUS; SUBCUTANEOUS at 11:10

## 2019-12-03 RX ADMIN — POTASSIUM CHLORIDE 20 MEQ: 750 TABLET, FILM COATED, EXTENDED RELEASE ORAL at 07:13

## 2019-12-03 RX ADMIN — BRIMONIDINE TARTRATE 1 DROP: 2 SOLUTION OPHTHALMIC at 17:40

## 2019-12-03 RX ADMIN — LEVOTHYROXINE SODIUM 175 MCG: 150 TABLET ORAL at 05:26

## 2019-12-03 RX ADMIN — Medication 10 ML: at 07:17

## 2019-12-03 RX ADMIN — PRAVASTATIN SODIUM 80 MG: 40 TABLET ORAL at 21:18

## 2019-12-03 RX ADMIN — BRIMONIDINE TARTRATE 1 DROP: 2 SOLUTION OPHTHALMIC at 09:28

## 2019-12-03 RX ADMIN — Medication 40 ML: at 05:27

## 2019-12-03 RX ADMIN — POTASSIUM PHOSPHATE, MONOBASIC AND POTASSIUM PHOSPHATE, DIBASIC: 224; 236 INJECTION, SOLUTION INTRAVENOUS at 13:41

## 2019-12-03 RX ADMIN — Medication 10 ML: at 13:41

## 2019-12-03 RX ADMIN — CLOPIDOGREL BISULFATE 75 MG: 75 TABLET, FILM COATED ORAL at 09:28

## 2019-12-03 RX ADMIN — CEFEPIME 1 G: 1 INJECTION, POWDER, FOR SOLUTION INTRAMUSCULAR; INTRAVENOUS at 13:40

## 2019-12-03 RX ADMIN — INSULIN GLARGINE 20 UNITS: 100 INJECTION, SOLUTION SUBCUTANEOUS at 11:07

## 2019-12-03 RX ADMIN — Medication 1 AMPULE: at 09:28

## 2019-12-03 RX ADMIN — Medication 1 AMPULE: at 21:18

## 2019-12-03 NOTE — DIABETES MGMT
[facility]    DTC Progress Note    Recommendations/ Comments: Pt discussed with rounding team and Dr. Nik uQan. Lantus decreased this am by hospitalist NP, Morteza Nair. Order received from Morteza Nair to begin high sensitivity correction. If po intake increases recommend beginning humalog 4 units ac tid. Pt ate breakfast well. Family to bring in pt's teeth. Chart reviewed on Deana Chase during Multidisciplinary Rounds. A1c:   Lab Results   Component Value Date/Time    Hemoglobin A1c >14.0 (H) 12/01/2019 12:03 PM           Recent Glucose Results:   Lab Results   Component Value Date/Time    GLU 64 (L) 12/03/2019 04:20 AM    GLUCPOC 102 (H) 12/03/2019 05:52 AM    GLUCPOC 72 12/03/2019 05:22 AM    GLUCPOC 72 12/03/2019 05:22 AM        Lab Results   Component Value Date/Time    Creatinine 0.77 12/03/2019 04:20 AM     Estimated Creatinine Clearance: 60.9 mL/min (based on SCr of 0.77 mg/dL). Active Orders   Diet    DIET DIABETIC CONSISTENT CARB Regular; 2 GM NA (House Low NA)        PO intake:   Patient Vitals for the past 72 hrs:   % Diet Eaten   12/03/19 0902 50 %   12/02/19 1805 50 %       Will continue to follow as needed. Thank you.   MAJOR BarcenasN, RN, [address]

## 2019-12-03 NOTE — PROGRESS NOTES
Problem: Mobility Impaired (Adult and Pediatric)  Goal: *Acute Goals and Plan of Care (Insert Text)  Description  FUNCTIONAL STATUS PRIOR TO ADMISSION: Patient was independent and active without use of DME. Reports history of 1 fall over previous 6 months. States she is still driving. Does all cooking, cleaning, household chores. Just discharged home from [facility] x1 week ago and states she did not receive HH PT or additional therapy follow up. HOME SUPPORT PRIOR TO ADMISSION: The patient lived with 2 sons. Pt states one son is \"special needs\" and home with her during the day, other son works full-time and is only home at night time    Physical Therapy Goals  Initiated 12/2/2019  1. Patient will move from supine to sit and sit to supine , scoot up and down and roll side to side in bed with independence within 7 day(s). 2.  Patient will transfer from bed to chair and chair to bed with supervision/set-up using the least restrictive device within 7 day(s). 3.  Patient will perform sit to stand with supervision/set-up within 7 day(s). 4.  Patient will ambulate with supervision/set-up for 300 feet with the least restrictive device within 7 day(s). 5.  Patient will ascend/descend 12 stairs with 1 handrail(s) with supervision/set-up within 7 day(s). Note:   PHYSICAL THERAPY TREATMENT  Patient: Marques Pool (33 y.o. female)  Date: 12/3/2019  Diagnosis: DKA (diabetic ketoacidoses) ([facility] [address] [E11.10], Encephalopathy [G93.40]        Precautions:  falls  Chart, physical therapy assessment, plan of care and goals were reviewed. ASSESSMENT  Patient continues with skilled PT services and is progressing towards goals, no LOB or SOB, did well with transfers and ther-ex, good motivation, did well with gait and no AD, vc's for safety. Current Level of Function Impacting Discharge (mobility/balance): supervision         PLAN :  Patient continues to benefit from skilled intervention to address the above impairments. Continue treatment per established plan of care. to address goals. Recommendation for discharge: (in order for the patient to meet his/her long term goals)  To be determined:  HHPT w/ 24hr supervision vs SNF    This discharge recommendation:  Has not yet been discussed the attending provider and/or case management    IF patient discharges home will need the following DME: none     OBJECTIVE DATA SUMMARY:     Critical Behavior:  Neurologic State: Alert  Orientation Level: Oriented X4  Cognition: Follows commands     Functional Mobility Training:  Bed Mobility: Pt sitting in chair on arrival.    Transfers:  Sit to Stand: Supervision  Stand to Sit: Supervision  Interventions: Verbal cues  Level of Assistance: Supervision    Balance:  Sitting: Intact; Without support  Standing: Intact; Without support  Standing - Static: Good; Unsupported  Standing - Dynamic : Good; Unsupported    Ambulation/Gait Training:  Distance (ft): 200 Feet (ft)  Assistive Device: Gait belt  Ambulation - Level of Assistance: Stand-by assistance  Gait Abnormalities: Decreased step clearance  Right Side Weight Bearing: Full  Left Side Weight Bearing: Full  Base of Support: Narrowed  Speed/Deepika: Pace decreased (<100 feet/min)  Step Length: Left shortened;Right shortened    Therapeutic Exercises:   sitting  EXERCISE   Sets   Reps   Active Active Assist   Passive   Comments   Ankle pumps 1 10 [x] [] [] bilat   Heel raises 1 10 [x] [] [] \"   Toe tap 1 10 [x] [] [] \"   Knee ext 1 10 [x] [] [] \"   Hip flex 1 10 [x] [] [] \"     Pain Rating: see flow sheet    Activity Tolerance: Good    After treatment patient left in no apparent distress: Sitting in chair and Call bell within reach    COMMUNICATION/COLLABORATION:   The patients plan of care was discussed with: Registered Nurse    Mel Nguyen PTA   Time Calculation: 25 mins

## 2019-12-03 NOTE — PROGRESS NOTES
Problem: Self Care Deficits Care Plan (Adult)  Goal: *Acute Goals and Plan of Care (Insert Text)  Description    FUNCTIONAL STATUS PRIOR TO ADMISSION: Patient was independent and active without use of DME.     HOME SUPPORT: The patient lived with family and special needs son. Occupational Therapy Goals  Initiated 12/3/2019  1. Patient will perform grooming with supervision/set-up within 7 day(s). 2.  Patient will perform bathing with supervision/set-up within 7 day(s). 3.  Patient will perform lower body dressing with supervision/set-up within 7 day(s). 4.  Patient will perform toilet transfers with supervision/set-up within 7 day(s). 5.  Patient will perform all aspects of toileting with independence within 7 day(s). 6.  Patient will participate in upper extremity therapeutic exercise/activities with independence for 5 minutes within 7 day(s). 7.  Patient will utilize energy conservation techniques during functional activities with verbal cues within 7 day(s). Outcome: Progressing Towards Goal   OCCUPATIONAL THERAPY EVALUATION  Patient: Pb Al (50 y.o. female)  Date: 12/3/2019  Primary Diagnosis: [facility]di[facility]acid[facility] [E11.10]  Encephalopathy [G93.40]        Precautions:        ASSESSMENT  Based on the objective data described below, the patient presents with decreased endurance, strength, functional mobility, ADLs . Pt was living at home with family and stated that she was independent with ADLs and ILS PTA. She has functional range and strength in BUE and she is CGa for donning socks and shoes. Pt is CGa to stand and transfers  . Current Level of Function Impacting Discharge (ADLs/self-care): decreased endurance, strength and aDLs    Functional Outcome Measure: The patient scored 60/100 on the Barthel outcome measure which is indicative of CGa to SB with ADLs and ILS.       Other factors to consider for discharge: pt would benefit from home care OT and PT Patient will benefit from skilled therapy intervention to address the above noted impairments. PLAN :  Recommendations and Planned Interventions: self care training, functional mobility training, therapeutic exercise, balance training, endurance activities, patient education, home safety training, and family training/education    Frequency/Duration: Patient will be followed by occupational therapy 4 times a week to address goals. Recommendation for discharge: (in order for the patient to meet his/her long term goals)  Occupational therapy at least 2 days/week in the home AND ensure assist and/or supervision for safety with ADLs and ILS     This discharge recommendation:  Has been made in collaboration with the attending provider and/or case management    IF patient discharges home will need the following DME: none       SUBJECTIVE:   Patient stated I now my son could help me if I needed him.     OBJECTIVE DATA SUMMARY:   HISTORY:   Past Medical History:   Diagnosis Date    Diabetes ([facility] [address]     Heart failure ([facility] [address]     unknown to family    Hypercholesteremia     Hypertension     Stroke [facility])     Thyroid disease      Past Surgical History:   Procedure Laterality Date    HX GYN      HX HEENT      thyroidectomy    HX HYSTERECTOMY      REMOVAL GALLBLADDER      THYROIDECTOMY         Expanded or extensive additional review of patient history:     Home Situation  Home Environment: Private residence  # Steps to Enter: 4  Rails to Enter: Yes  Hand Rails : Right  One/Two Story Residence: Two story  # of Interior Steps: 12  Interior Rails: Right  Lift Chair Available: No  Living Alone: No  Support Systems: Child(oumar)  Patient Expects to be Discharged to[de-identified] Private residence  Current DME Used/Available at Home: None    Hand dominance: Right    EXAMINATION OF PERFORMANCE DEFICITS:  Cognitive/Behavioral Status:  Neurologic State: Alert  Orientation Level: Oriented X4  Cognition: Follows commands  Perception: Appears intact  Perseveration: No perseveration noted  Safety/Judgement: Awareness of environment    Skin: in good health     Edema: minimal edema in BLE    Hearing: Auditory  Auditory Impairment: None    Vision/Perceptual:                    Glasses, intact                 Range of Motion:    AROM: Generally decreased, functional  PROM: Generally decreased, functional                      Strength:    Strength: Generally decreased, functional                Coordination:  Coordination: Within functional limits  Fine Motor Skills-Upper: Left Intact; Right Intact    Gross Motor Skills-Upper: Left Intact; Right Intact    Tone & Sensation:    Tone: Normal  Sensation: Intact                      Balance:  Sitting: Intact; Without support  Standing: Intact; Without support  Standing - Static: Good; Unsupported  Standing - Dynamic : Good; Unsupported    Functional Mobility and Transfers for ADLs:  Bed Mobility:       Transfers:  Sit to Stand: Supervision  Stand to Sit: Supervision  Toilet Transfer : Contact guard assistance    ADL Assessment:  Feeding: Independent    Oral Facial Hygiene/Grooming: Independent    Bathing: Moderate assistance;Minimum assistance    Upper Body Dressing: Setup    Lower Body Dressing: Contact guard assistance    Toileting: Stand by assistance;Contact guard assistance                ADL Intervention and task modifications:                                     Cognitive Retraining  Safety/Judgement: Awareness of environment    Therapeutic Exercise:     Functional Measure:  Barthel Index:    Bathin  Bladder: 10  Bowels: 10  Groomin  Dressin  Feeding: 10  Mobility: 5  Stairs: 0  Toilet Use: 5  Transfer (Bed to Chair and Back): 10  Total: 60/100        The Barthel ADL Index: Guidelines  1. The index should be used as a record of what a patient does, not as a record of what a patient could do.   2. The main aim is to establish degree of independence from any help, physical or verbal, however minor and for whatever reason. 3. The need for supervision renders the patient not independent. 4. A patient's performance should be established using the best available evidence. Asking the patient, friends/relatives and nurses are the usual sources, but direct observation and common sense are also important. However direct testing is not needed. 5. Usually the patient's performance over the preceding 24-48 hours is important, but occasionally longer periods will be relevant. 6. Middle categories imply that the patient supplies over 50 per cent of the effort. 7. Use of aids to be independent is allowed. Yariel Hanson., Barthel, D.W. (1839). Functional evaluation: the Barthel Index. [address] [facility] (14)2. Melvina Armstrong mathieu LINDA Diehl, Lorie Gibbs.Frank., Daniel, [address] (1999). Measuring the change indisability after inpatient rehabilitation; comparison of the responsiveness of the Barthel Index and Functional [place] Measure. Journal of Neurology, Neurosurgery, and Psychiatry, 66(4), 444-562. Elenita Hutchins NEliJ.A, BRENNA Mckay, & Ela Trinh MJOSHUA. (2004.) Assessment of post-stroke quality of life in cost-effectiveness studies: The usefulness of the Barthel Index and the EuroQoL-5D. Quality of Life Research, 15, 139-72         Occupational Therapy Evaluation Charge Determination   History Examination Decision-Making   LOW Complexity : Brief history review  LOW Complexity : 1-3 performance deficits relating to physical, cognitive , or psychosocial skils that result in activity limitations and / or participation restrictions  LOW Complexity : No comorbidities that affect functional and no verbal or physical assistance needed to complete eval tasks       Based on the above components, the patient evaluation is determined to be of the following complexity level: LOW   Pain Rating:      Activity Tolerance:   Fair  Please refer to the flowsheet for vital signs taken during this treatment.     After treatment patient left in no apparent distress:    Sitting in chair, Call bell within reach, Bed / chair alarm activated, and Caregiver / family present    COMMUNICATION/EDUCATION:   The patients plan of care was discussed with: Physical Therapist and Registered Nurse. Home safety education was provided and the patient/caregiver indicated understanding. and Patient/family have participated as able in goal setting and plan of care. This patients plan of care is appropriate for delegation to [facility].     Thank you for this referral.  Seferino Hinds OT  Time Calculation: 38 mins

## 2019-12-03 NOTE — PROGRESS NOTES
12/3/2019    INTENSIVIST PROGRESS NOTE:     Patient seen and evaluated, chart reviewed   70 yo female presented with weakness, malaise  Found to have DKA, septic shock, cellulitis  Started on IV abx, insulin drip, bicarb drip  Now pt in CCU critically ill    12/2:  Feels well  Gap closed   Says she just forgets to take her insulin at home  Off pressors    12/3:   Doing well  Sitting in bedside chair  Denies complaints    ROS: unable to obtain    Visit Vitals  /84   Pulse 82   Temp 98.3 °F (36.8 °C)   Resp 23   Ht 5' 4\" (1.626 m)   Wt 75.5 kg (166 lb 7.2 oz)   SpO2 100%   Breastfeeding No   BMI 28.57 kg/m²       General: weak, debilitated  Eyes: anicteric  HEENT: dry oral mucosa  Neck: FROM  CV: RRR tachy  Lungs: soft  Abd: soft  : no flank pain  Ext: no edema  Skin: no rash  Musculoskeletal: normal inspection  Neuro: alert  CXR: clear    Labs reviewed    A/P:  - DKA: off insulin drip, adjusting SQ dosing  - DC IVF  - off pressors  - severe sepsis: IV abx. Stop vanc. Can stop cefepime tomorrow if cultures negative and stable  - ONEYDA: IVF.  Better  - DVT prophylaxis  - start diabetic diet  - DC CVC  - replete lytes  - CM consult given difficult home situation, recurrent admissions  - PT/OT  - Stable to leave the ICU today  Sally Brown, DO

## 2019-12-03 NOTE — PROGRESS NOTES
Hospitalist Progress Note    NAME: Hector Valdez   :  1940   MRN:  [id number]       Interim Hospital Summary: 69 y.o. female whom presented on 2019 with weakness and malaise, found to have DKA, septic shock, and cellulitis. Pt was treated with IV ABX, insulin drip, and bicarb drip. DKA resolved, anion quentin closed. Assessment / Plan:  Transferred to medical floor. Will observe blood glucose the rest of the day then discharge tomorrow morning. Poorly controlled IDDM  DKA  Type II DM with hyper/hypoglycemia  Hgb A1C >14  - Anion gap closed since 19 at 1631. Continue with lantus and pre-meal insulin. [facility] blood glucose check and follow SSI    Discussed with the pt and pt's sons about importance of checking blood glucose at least twice a day (before breakfast and at bedtime), follow dietary regimen, and follow with PCP in one week upon discharge. Plan to discharge tomorrow     Leucocytosis (resolved)  Lactic acidosis (resolved)  Septic shock (resolved)  Left arm cellulitis  CXR 19:  Stable appearance of the chest.  CT abdomen/pelvis 19:  Deep soft tissue gas in the left arm suspicious for possible cellulitis. No acute intra-abdominal findings with incidental findings as above. LUE venous ultrasound 19:  · No evidence of deep vein thrombosis in the left upper extremity. Left upper extremity veins were visualized in the transverse and longitudinal views. The vessels showed normal color filling and compressibility. Doppler interrogation showed phasic and spontaneous flow. · No evidence of acute DVT in the left upper extremity. · Limited visualization due to left arm bandage. Blood culture no growth so far  - Off pressors  - Repeat LA in a.m 1.6  - Continue current abx (vancomycin/cefepime both day #2). Will complete total 7 days of ABX upon discharge     Anemia  - could be dilutional component.  Will check anemia work up in am     No s/sx of bleeding. hgb 9.7 (was 10.6 on admission)     ONEYDA, resolved  - Continue hydration. Continue to avoid nephrotoxins.       Pt c/o constipation. But, nursing staff informed me that she has been having daily loose bm for the past couple days. 25.0 - 29.9 Overweight / Body mass index is 28.23 kg/m².     Code status: Full  Prophylaxis: SCD's  Recommended Disposition: Home w/Family    Recommended Disposition:  PT, OT, RN   I reviewed with Dr. London Tam about the medical history and the findings on the physical examination.  agreed with the patient's diagnosis and concur with the plan. Subjective:     Chief Complaint / Reason for Physician Visit  \"I feel ok\". Discussed with RN events overnight. Review of Systems:  Symptom Y/N Comments  Symptom Y/N Comments   Fever/Chills n   Chest Pain n    Poor Appetite    Edema     Cough    Abdominal Pain n    Sputum    Joint Pain     SOB/AGUILERA n   Pruritis/Rash     Nausea/vomit n   Tolerating PT/OT     Diarrhea    Tolerating Diet     Constipation    Other       Could NOT obtain due to:      Objective:     VITALS:   Last 24hrs VS reviewed since prior progress note.  Most recent are:  Patient Vitals for the past 24 hrs:   Temp Pulse Resp BP SpO2   12/03/19 1000  82 23 149/84    12/03/19 0800  86 22 130/66 100 %   12/03/19 0714 98.3 °F (36.8 °C)       12/03/19 0600  93 (!) 32  100 %   12/03/19 0500  90 21 136/47 98 %   12/03/19 0400 98.2 °F (36.8 °C) 83 18 116/42 98 %   12/03/19 0300  83 19 106/40 98 %   12/03/19 0200  87 20 114/52 98 %   12/03/19 0100  82 18 (!) 115/36 98 %   12/03/19 0000  84 21 129/49 100 %   12/02/19 2300 98.5 °F (36.9 °C) 76 18 114/55 98 %   12/02/19 2200  81 20 (!) 99/36 98 %   12/02/19 2100  86 18 (!) 103/38 98 %   12/02/19 2000 98.1 °F (36.7 °C) 95 23 114/61 99 %   12/02/19 1700  91 18 113/55 100 %   12/02/19 1650 99.2 °F (37.3 °C) 97 21 130/64 100 %   12/02/19 1640  85 23 115/42 100 %   12/02/19 1600  85 23 108/41 100 %   12/02/19 1520  88 11 (!) 112/38 100 %   12/02/19 1500  93 19 (!) 117/34 100 %   12/02/19 1447  72  120/48    12/02/19 1441  92  126/51    12/02/19 1437  91  123/40    12/02/19 1400  89 21 112/44 100 %       Intake/Output Summary (Last 24 hours) at 12/3/2019 1329  Last data filed at 12/3/2019 0748  Gross per 24 hour   Intake 2395.59 ml   Output 600 ml   Net 1795.59 ml        PHYSICAL EXAM:  General: Ill appearing. Alert, cooperative, no acute distress    EENT:  EOMI. Anicteric sclerae. MMM  Resp:  CTA bilaterally, no wheezing or rales. No accessory muscle use  CV:  Regular  rhythm,  trace of pitting edema in low extremities  GI:  Soft, Non distended, Non tender.  +Bowel sounds  Neurologic:  Alert and oriented X 3, normal speech,   Psych:   Good insight. Not anxious nor agitated  Skin:  No rashes. No jaundice    Reviewed most current lab test results and cultures  YES  Reviewed most current radiology test results   YES  Review and summation of old records today    NO  Reviewed patient's current orders and MAR    YES  PMH/ reviewed - no change compared to H&P  ________________________________________________________________________  Care Plan discussed with:    Comments   Patient y    Family  y Sons are at bedside   RN y    Care Manager y    Consultant                        Multidiciplinary team rounds were held today with , nursing, pharmacist and clinical coordinator. Patient's plan of care was discussed; medications were reviewed and discharge planning was addressed. ________________________________________________________________________  Jesse Pillai NP     Procedures: see electronic medical records for all procedures/Xrays and details which were not copied into this note but were reviewed prior to creation of Plan. LABS:  I reviewed today's most current labs and imaging studies.   Pertinent labs include:  Recent Labs     12/03/19  0420 12/02/19  0406 12/01/19  0922   WBC 8.4 17.1* 21.3*   HGB 9. 7* 9.8* 10.6*   HCT 28.7* 28.7* 34.7*    336 443*     Recent Labs     12/03/19  0420 12/02/19  1017 12/02/19  0406 12/01/19 2010 12/01/19  1203 12/01/19  0922    141 142 140   < > 131* 126*   K 3.4* 4.2 3.8 2.9*   < > 5.0 5.8*   * 108 109* 105   < > 95* 87*   CO2 26 24 24 20*   < > 5* <5*   GLU 64* 255* 117* 256*   < > 854* 925*   BUN 13 20 23* 32*   < > 44* 44*   CREA 0.77 0.94 0.97 1.39*   < > 2.17* 1.91*   CA 7.1* 7.3* 7.6* 7.6*   < > 7.9* 8.7   MG  --  1.7 1.8 1.8   < > 2.4 2.5*   PHOS 1.3*  --   --   --   --  9.3*  --    ALB  --   --  2.3*  --   --   --  3.1*   TBILI  --   --  0.5  --   --   --  0.6   SGOT  --   --  30  --   --   --  35   ALT  --   --  28  --   --   --  38   INR  --   --   --   --   --  1.0  --     < > = values in this interval not displayed.        Signed: )Elif Rodriges, NP

## 2019-12-03 NOTE — PROGRESS NOTES
Bedside and Verbal shift change report given to OPAL Shannon RN (oncoming nurse) by [facility] Gurvinder Fox RN (offgoing nurse). Report included the following information SBAR, Kardex, Intake/Output, MAR, Recent Results and Cardiac Rhythm NSR.   1950: Assessment complete. 2300: Reassessment unchanged. 0400: Reassessment unchanged. Blood for am labs drawn and taken to lab.   0525: Lab glucose 64. FSBS 72 twice. 4 oz orange juice given. Patient also drank a few extra sips of orange juice with am meds. 7029: Bedside and Verbal shift change report given to MARISOL Fox RN (oncoming nurse) by OPAL Shannon RN (offgoing nurse). Report included the following information SBAR, Kardex, Intake/Output, MAR, Recent Results and Cardiac Rhythm NSR.

## 2019-12-03 NOTE — INTERDISCIPLINARY ROUNDS
Interdisciplinary team rounds were held 12/3/2019 with the following team members:Care Management, Diabetes Treatment Specialist, Nursing, Nutrition, Pharmacy, Physical Therapy, Physician and Respiratory Therapy. Plan of care discussed. See clinical pathway and/or care plan for interventions and desired outcomes.

## 2019-12-04 ENCOUNTER — HOME HEALTH ADMISSION (OUTPATIENT)
Dept: HOME HEALTH SERVICES | Facility: HOME HEALTH | Age: 79
End: 2019-12-04
Payer: MEDICARE

## 2019-12-04 VITALS
HEIGHT: 64 IN | DIASTOLIC BLOOD PRESSURE: 56 MMHG | TEMPERATURE: 98.1 F | BODY MASS INDEX: 28.42 KG/M2 | RESPIRATION RATE: 17 BRPM | HEART RATE: 89 BPM | WEIGHT: 166.45 LBS | SYSTOLIC BLOOD PRESSURE: 141 MMHG | OXYGEN SATURATION: 100 %

## 2019-12-04 PROBLEM — Z79.4 TYPE 2 DIABETES MELLITUS WITH HYPERGLYCEMIA, WITH LONG-TERM CURRENT USE OF INSULIN (HCC): Status: ACTIVE | Noted: 2018-10-21

## 2019-12-04 PROBLEM — A41.9 SEPTIC SHOCK (HCC): Status: ACTIVE | Noted: 2019-12-04

## 2019-12-04 PROBLEM — E11.65 TYPE 2 DIABETES MELLITUS WITH HYPERGLYCEMIA, WITH LONG-TERM CURRENT USE OF INSULIN (HCC): Status: ACTIVE | Noted: 2018-10-21

## 2019-12-04 PROBLEM — E11.649 HYPOGLYCEMIA DUE TO TYPE 2 DIABETES MELLITUS (HCC): Status: ACTIVE | Noted: 2019-12-04

## 2019-12-04 PROBLEM — D72.829 LEUKOCYTOSIS: Status: ACTIVE | Noted: 2019-12-04

## 2019-12-04 PROBLEM — R65.21 SEPTIC SHOCK (HCC): Status: ACTIVE | Noted: 2019-12-04

## 2019-12-04 PROBLEM — L03.114 LEFT ARM CELLULITIS: Status: ACTIVE | Noted: 2019-12-04

## 2019-12-04 PROBLEM — D64.9 ANEMIA: Status: ACTIVE | Noted: 2019-12-04

## 2019-12-04 LAB
ANION GAP SERPL CALC-SCNC: 9 MMOL/L (ref 5–15)
BUN SERPL-MCNC: 12 MG/DL (ref 6–20)
BUN/CREAT SERPL: 20 (ref 12–20)
CALCIUM SERPL-MCNC: 7.3 MG/DL (ref 8.5–10.1)
CHLORIDE SERPL-SCNC: 109 MMOL/L (ref 97–108)
CO2 SERPL-SCNC: 20 MMOL/L (ref 21–32)
CREAT SERPL-MCNC: 0.61 MG/DL (ref 0.55–1.02)
FERRITIN SERPL-MCNC: 127 NG/ML (ref 26–388)
FOLATE SERPL-MCNC: 7.2 NG/ML (ref 5–21)
GLUCOSE BLD STRIP.AUTO-MCNC: 149 MG/DL (ref 65–100)
GLUCOSE BLD STRIP.AUTO-MCNC: 222 MG/DL (ref 65–100)
GLUCOSE SERPL-MCNC: 129 MG/DL (ref 65–100)
IRON SATN MFR SERPL: 25 % (ref 20–50)
IRON SERPL-MCNC: 65 UG/DL (ref 35–150)
MAGNESIUM SERPL-MCNC: 1.7 MG/DL (ref 1.6–2.4)
POTASSIUM SERPL-SCNC: 3.8 MMOL/L (ref 3.5–5.1)
SERVICE CMNT-IMP: ABNORMAL
SERVICE CMNT-IMP: ABNORMAL
SODIUM SERPL-SCNC: 138 MMOL/L (ref 136–145)
TIBC SERPL-MCNC: 259 UG/DL (ref 250–450)
VIT B12 SERPL-MCNC: 1140 PG/ML (ref 193–986)

## 2019-12-04 PROCEDURE — 97116 GAIT TRAINING THERAPY: CPT

## 2019-12-04 PROCEDURE — 97530 THERAPEUTIC ACTIVITIES: CPT

## 2019-12-04 PROCEDURE — 82728 ASSAY OF FERRITIN: CPT

## 2019-12-04 PROCEDURE — 74011636637 HC RX REV CODE- 636/637: Performed by: INTERNAL MEDICINE

## 2019-12-04 PROCEDURE — 74011000258 HC RX REV CODE- 258: Performed by: INTERNAL MEDICINE

## 2019-12-04 PROCEDURE — 74011250637 HC RX REV CODE- 250/637: Performed by: NEUROMUSCULOSKELETAL MEDICINE & OMM

## 2019-12-04 PROCEDURE — 74011250636 HC RX REV CODE- 250/636: Performed by: INTERNAL MEDICINE

## 2019-12-04 PROCEDURE — 74011250637 HC RX REV CODE- 250/637: Performed by: INTERNAL MEDICINE

## 2019-12-04 PROCEDURE — 36415 COLL VENOUS BLD VENIPUNCTURE: CPT

## 2019-12-04 PROCEDURE — 94760 N-INVAS EAR/PLS OXIMETRY 1: CPT

## 2019-12-04 PROCEDURE — 74011250637 HC RX REV CODE- 250/637: Performed by: NURSE PRACTITIONER

## 2019-12-04 PROCEDURE — 82607 VITAMIN B-12: CPT

## 2019-12-04 PROCEDURE — 82962 GLUCOSE BLOOD TEST: CPT

## 2019-12-04 PROCEDURE — 82746 ASSAY OF FOLIC ACID SERUM: CPT

## 2019-12-04 PROCEDURE — 83735 ASSAY OF MAGNESIUM: CPT

## 2019-12-04 PROCEDURE — 80048 BASIC METABOLIC PNL TOTAL CA: CPT

## 2019-12-04 PROCEDURE — 74011636637 HC RX REV CODE- 636/637: Performed by: NURSE PRACTITIONER

## 2019-12-04 PROCEDURE — 83540 ASSAY OF IRON: CPT

## 2019-12-04 RX ORDER — CEPHALEXIN 500 MG/1
500 CAPSULE ORAL 2 TIMES DAILY
Qty: 10 CAP | Refills: 0 | Status: SHIPPED | OUTPATIENT
Start: 2019-12-04 | End: 2019-12-09

## 2019-12-04 RX ADMIN — CEFEPIME 1 G: 1 INJECTION, POWDER, FOR SOLUTION INTRAMUSCULAR; INTRAVENOUS at 05:08

## 2019-12-04 RX ADMIN — Medication 10 ML: at 09:00

## 2019-12-04 RX ADMIN — BRIMONIDINE TARTRATE 1 DROP: 2 SOLUTION OPHTHALMIC at 09:32

## 2019-12-04 RX ADMIN — INSULIN GLARGINE 20 UNITS: 100 INJECTION, SOLUTION SUBCUTANEOUS at 08:59

## 2019-12-04 RX ADMIN — INSULIN LISPRO 2 UNITS: 100 INJECTION, SOLUTION INTRAVENOUS; SUBCUTANEOUS at 13:34

## 2019-12-04 RX ADMIN — PANTOPRAZOLE SODIUM 40 MG: 40 TABLET, DELAYED RELEASE ORAL at 08:59

## 2019-12-04 RX ADMIN — LEVOTHYROXINE SODIUM 175 MCG: 150 TABLET ORAL at 05:09

## 2019-12-04 RX ADMIN — Medication 10 ML: at 05:10

## 2019-12-04 RX ADMIN — CLOPIDOGREL BISULFATE 75 MG: 75 TABLET, FILM COATED ORAL at 08:59

## 2019-12-04 RX ADMIN — CEFEPIME 1 G: 1 INJECTION, POWDER, FOR SOLUTION INTRAMUSCULAR; INTRAVENOUS at 13:11

## 2019-12-04 RX ADMIN — Medication 1 AMPULE: at 09:23

## 2019-12-04 NOTE — PROGRESS NOTES
Bedside shift change report received by   ANGELIA Harris RN from [facility]. Report included the following information SBAR, ED Summary, OR Summary, Procedure Summary and Recent Results.

## 2019-12-04 NOTE — ROUTINE PROCESS
Pt's IV discontinued as pt is being discharged. Catheter tip intact, no redness or swelling noted at insertion site. Band-aid applied. Pt discharged per MD order. Pt has all personal belongings and discharge instructions. Discharge instructions gone over with pt and pt's friends. Pt does not have any further questions regarding discharge.

## 2019-12-04 NOTE — DISCHARGE INSTRUCTIONS
HOSPITALIST DISCHARGE INSTRUCTIONS    NAME: Chary Au   :  1940   MRN:  [id number]     Date/Time:  2019 10:12 AM    ADMIT DATE: 2019   DISCHARGE DATE: 2019     Attending Physician: Yessenia Dewey NP    DISCHARGE DIAGNOSIS:  ARMEN/Syed [address] Problems    Diagnosis Date Noted    Hypoglycemia due to type 2 diabetes mellitus ([facility] 2019    Leukocytosis 2019    Septic shock ([facility] 2019    Left arm cellulitis 2019    Anemia 2019    ONEYDA (acute kidney injury) ([facility] 2019    Lactic acidosis 06/10/2019    Type 2 diabetes mellitus with hyperglycemia, with long-term current use of insulin ([facility] 10/21/2018    DKA (diabetic ketoacidoses) ([facility] 2017       Medications: Per above medication reconciliation. Pain Management: per above medications    Recommended diet: Diabetic Diet    Recommended activity: Activity as tolerated    Wound care: None    Indwelling devices:  None    Supplemental Oxygen: None    Code status: Full        Outside physician follow up: Follow-up Information     Follow up With Specialties Details Why Contact Info    Rd Alejo MD Internal Medicine Go on 12/10/2019 For scheduled appointment at 4:15PM  [place]  [phone]      [email or web address]  This is the provider of your home health services  [address] [place], [place] [address] [facility]            Information obtained by :  I understand that if any problems occur once I am at home I am to contact my physician. I understand and acknowledge receipt of the instructions indicated above.                                                                                                                                            Physician's or R.N.'s Signature                                                                  Date/Time Patient or Repres

## 2019-12-04 NOTE — PROGRESS NOTES
Problem: Mobility Impaired (Adult and Pediatric)  Goal: *Acute Goals and Plan of Care (Insert Text)  Description  FUNCTIONAL STATUS PRIOR TO ADMISSION: Patient was independent and active without use of DME. Reports history of 1 fall over previous 6 months. States she is still driving. Does all cooking, cleaning, household chores. Just discharged home from [facility] x1 week ago and states she did not receive HH PT or additional therapy follow up. HOME SUPPORT PRIOR TO ADMISSION: The patient lived with 2 sons. Pt states one son is \"special needs\" and home with her during the day, other son works full-time and is only home at night time    Physical Therapy Goals  Initiated 12/2/2019  1. Patient will move from supine to sit and sit to supine , scoot up and down and roll side to side in bed with independence within 7 day(s). 2.  Patient will transfer from bed to chair and chair to bed with supervision/set-up using the least restrictive device within 7 day(s). 3.  Patient will perform sit to stand with supervision/set-up within 7 day(s). 4.  Patient will ambulate with supervision/set-up for 300 feet with the least restrictive device within 7 day(s). 5.  Patient will ascend/descend 12 stairs with 1 handrail(s) with supervision/set-up within 7 day(s). Outcome: Progressing Towards Goal   PHYSICAL THERAPY TREATMENT  Patient: Niyah Keller (28 y.o. female)  Date: 12/4/2019  Diagnosis: DKA (diabetic ketoacidoses) ([facility] [E11.10]  Encephalopathy [G93.40]   DKA (diabetic ketoacidoses) ([facility]       Precautions:    Chart, physical therapy assessment, plan of care and goals were reviewed. ASSESSMENT  Patient continues with skilled PT services and is progressing towards goals. Pt is at an overall SUP level for all mobility and only requiring assistance for safety d/t forgetfulness and occasional confusion. Pt repeating herself in conversation multiple times(ie.  \"this hospital is so much bigger than I remember\" x 4 instances). Plan to return home with family support. Current Level of Function Impacting Discharge (mobility/balance): needs supervision for safety    Other factors to consider for discharge: None         PLAN :  Patient continues to benefit from skilled intervention to address the above impairments. Continue treatment per established plan of care. to address goals. Recommendation for discharge: (in order for the patient to meet his/her long term goals)  Physical therapy at least 2 days/week in the home vs none if family can provide increased assistance    This discharge recommendation:  Has been made in collaboration with the attending provider and/or case management    IF patient discharges home will need the following DME: patient owns DME required for discharge       SUBJECTIVE:   Patient stated You are just like my best friend.     OBJECTIVE DATA SUMMARY:   Critical Behavior:  Neurologic State: Alert, Appropriate for age  Orientation Level: Oriented X4  Cognition: Appropriate decision making, Appropriate for age attention/concentration, Appropriate safety awareness, Follows commands  Safety/Judgement: Awareness of environment  Functional Mobility Training:  Bed Mobility:                    Transfers:  Sit to Stand: Supervision  Stand to Sit: Modified independent                             Balance:  Sitting: Intact  Standing: Intact  Ambulation/Gait Training:  Distance (ft): 400 Feet (ft)  Assistive Device: Gait belt  Ambulation - Level of Assistance: Supervision                                               Stairs:  Number of Stairs Trained: 6  Stairs - Level of Assistance: Supervision   Rail Use: Left       Activity Tolerance:   Good  Please refer to the flowsheet for vital signs taken during this treatment.     After treatment patient left in no apparent distress:   Sitting in chair and Call bell within reach    COMMUNICATION/COLLABORATION:   The patients plan of care was discussed with: Simba Nurse    Connie Rounds, PT   Time Calculation: 24 mins

## 2019-12-04 NOTE — PROGRESS NOTES
Plan:  -Home with family   -[facility] SN/PT  -Medicaid screening  -Private duty resources  -Family to transport       Reason for Readmission:     DKA         RRAT Score and Risk Level:     16/Moderate      Level of Readmission:    Level 1      Care Conference scheduled: Will discuss in IDRs      Did you attend your follow up appointment (s): If not, why not: Attended PCP f/u appt          Resources/supports as identified by patient/family:   Supportive family        Top Challenges facing patient (as identified by patient/family and CM): Finances/Medication cost?    No needs identified    Transportation      Family assists with transportation   Support system or lack thereof? Supportive family      Living arrangements? With sons       Self-care/ADLs/Cognition? Independent/Some forgetfulness       Current Advanced Directive/Advance Care Plan:  Pt reports no written AMD but states her children work together to assist her            Plan for utilizing home health:   SN/PT safety eval              Transition of Care Plan:      Home with family with [place] SN/PT safety eval      1:20PM  CM PC to pt's dtr to confirm d/c transportation. Per dtr, she will call pt's friend, Ronak Godfrey, to pick her up shortly. 12:18PM  2nd IM notice provided, explained, signed, and placed on chart. Per pt, she spoke with he dtr and a friend will be picking her up after lunch. Pt ready for d/c from CM perspective. 11:34AM  D/c order acknowledged by CM. Referral accepted by [facility]. Information added to AVS. PCP appt scheduled and added to AVS.         10:34AM  CM chart review of pt's last PCP appt. Pt saw Dr. Michelle Reno ~wk prior to his admission. CM noted pt has some issues memory/confusion that are contributing to her uncontrolled HD. CM PC to pt's dtr, Alexandre Appiah ([phone]). Alexandre Appiah confirmed that Talia Cooper lives downstairs and that the family is working on taking turns providing care. CM provided dtr private duty list by email.  Request for screening sent to [facility] citing pt's dtr as appropriate contact. 9:23AM  CM met with pt to complete assessment and discuss d/c planning. Pt is a 70 yo female admitted for DKA. Pt previously admitted at [facility] 11/19-11/24. Per pt, she lives with her son, Loli Carlson, who has special needs with her son, Edgar Kelly, living downstairs. Loli Carlson is able to get help which pt has an emergency and \"helps me with things I forget\" but does not provide very much other assistance. Pt also names her dtr, Delfin Maciel, as a support. Both Delfin Maciel and Edgar Kelly work during the day. Pt moving around hospital room well and reports being independent at home with no DME use. She notes feeling a little bit weaker today due to being bed. Pt does not drive. She states that Edgar Maresl assists her in getting to her appts. Pt thinks that she attended her visit with Dr. Dann Klein between her last admission and this one. Pt denies a past Medicaid application and is agreeable to screening. No AMD on file and pt says she has no documents at home. She identified her children James Moise, and Delfin Maciel as her supports who will work together to care for her. Pt agreeable to CM calling her children to discuss d/c plan. Pt hesistant abut agreeable to [place]. Would like to use [facility] again. FOC offered, signed, and placed on chart. Referral sent to [facility] through [address] [place] [address]. (The Plan for Transition of Care is related to the following treatment goals: Baljeet Pretty    The Patient and/or patient representative, Ms. Vin Ojeda, was provided with a choice of provider and agrees   with the discharge plan. [x] Yes [] No    Freedom of choice list was provided with basic dialogue that supports the patient's individualized plan of care/goals, treatment preferences and shares the quality data associated with the providers. [x] Yes [] No)      9:00AM  Initial readmission assessment completed. Plan for d/c to home with family assistance and HH. Full note to follow.        ROSA Calle  Care Manager

## 2019-12-04 NOTE — DISCHARGE SUMMARY
Hospitalist Discharge Summary     Patient ID:  Bryanna Ortiz  [id number]  69 y.o.  1940 12/1/2019    PCP on record: Cailin Houston MD    Admit date: 12/1/2019  Discharge date and time: 12/4/2019    DISCHARGE DIAGNOSIS:    Active Hospital Problems    Diagnosis Date Noted    Hypoglycemia due to type 2 diabetes mellitus ([facility] 12/04/2019    Leukocytosis 12/04/2019    Septic shock ([facility] 12/04/2019    Left arm cellulitis 12/04/2019    Anemia 12/04/2019    ONEYDA (acute kidney injury) ([facility] 11/19/2019    Lactic acidosis 06/10/2019    Type 2 diabetes mellitus with hyperglycemia, with long-term current use of insulin ([facility] 10/21/2018    DKA [facility]diabetic k[facility]acidoses) [facility] 06/02/2017     CONSULTATIONS:  IP CONSULT TO HOSPITALIST  IP CONSULT TO ORTHOPEDIC SURGERY    Excerpted HPI from H&P of Samantha Abreu MD:  Khris Ma is a 69 y.o.   type 1 B diabetic presented to the emergency room, because of increasing lethargy and weakness.  The history was exclusively obtained from the records She was evaluated and was found to be in diabetic ketoacidosis possible septic had no c/o  Initially. . This is unfortunately a recurring problem for the patient exclusively due to her noncompliance.  Unfortunately, the assistance by the family has been somewhat limited.  In any event, she is admitted for DKA. Pt responds to simple questions. \"  ______________________________________________________________________  DISCHARGE SUMMARY/HOSPITAL COURSE:  for full details see H&P, daily progress notes, labs, consult notes.    Eddie Urena y.o. female was admitted to [facility] on 12/1/2019 and treated for the following medical complaints:     Poorly controlled IDDM  DKA  Type 2 DM with hyper/hypoglycemia  · Hgb A1c >14  · Gap has been closed since 12/01/2019  · Continue with Lantus and SSI  · Diabetic education reinforced   · Follow-up with PCP for further management    Leukocytosis- resolved  Lactic acidosis- resolved  Septic shock- resolved  Left arm cellulitis  · CXR:  Stable appearance of chest  · CT abd/pel:  Deep soft tissue gas in the left arm suspicious for possible cellulitis.  No acute intra-abdominal findings. · LUE venous US:  No evidence of deep vein thrombosis in the left upper extremity. Left upper extremity veins were visualized in the transverse and longitudinal views. The vessels showed normal color filling and compressibility. Doppler interrogation showed phasic and spontaneous flow. No evidence of acute DVT in the left upper extremity. Limited visualization due to left arm bandage. · BC NGTD  · Off pressors  · Repeat lactic acid 1.6  · Continue antibiotics until 12/09/2019    Macrocytic anemia  · Could be a dilutional issue  · No S/S of bleeding   · Hgb 9.7  · Iron panel within normal limits     ONEYDA-resolved   · Received IV hydration  · Avoid nephrotoxic medications    Patient's plan of care has been reviewed with them. Patient and/or family have verbally conveyed their understanding and agreement of the patient's signs, symptoms, diagnosis, treatment and prognosis and additionally agree to follow up as recommended or return to [facility] should their condition change prior to follow-up. Discharge instructions have also been provided to the patient with some educational information regarding their diagnosis as well a list of reasons why they would want to return to the office prior to their follow-up appointment should their condition change. [facility] to avoid frequent ED visits. [facility] can treat; pains, sprains, cuts, wounds, high fevers, upper respiratory infections and much more. There medical team is equipped with all the tools necessary to provide advanced medical care in the comfort of you home, workplace, or location of need. The medical team consists of doctors, nurse practitioners, and EMTs.   [facility] is available 7 days per week 9 am to 9 pm.   Request care by calling [phone] or by going online at [facility]  ______________________________________________________________________  Patient seen and examined by me on discharge day. Pertinent Findings:  Gen:    Not in distress  Chest: Clear lungs  CVS:   Regular rhythm. No edema  Abd:  Soft, not distended, not tender  Neuro:  Alert, oriented   _______________________________________________________________________  DISCHARGE MEDICATIONS:   Current Discharge Medication List      START taking these medications    Details   cephALEXin (KEFLEX) 500 mg capsule Take 1 Cap by mouth two (2) times a day for 5 days. Qty: 10 Cap, Refills: 0         CONTINUE these medications which have NOT CHANGED    Details   insulin degludec (TRESIBA FLEXTOUCH U-100) 100 unit/mL (3 mL) inpn 16 Units by SubCUTAneous route daily. 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 Refills: 11      metoprolol succinate (TOPROL-XL) 25 mg XL tablet Take 1 Tab by mouth daily. Qty: 30 Tab, Refills: 11      insulin aspart U-100 (NOVOLOG) 100 unit/mL (3 mL) inpn 4 units AC breakfast,lunch, and dinner  Qty: 1 Adjustable Dose Pre-filled Pen Syringe, Refills: 11    Associated Diagnoses: Hyperglycemia due to type 1 diabetes mellitus (HCC)      brimonidine (ALPHAGAN) 0.15 % ophthalmic solution Administer 1 Drop to both eyes two (2) times a day. Patient Follow Up Instructions: Activity: Activity as tolerated  Diet: Diabetic Diet  Wound Care: None needed    Follow-up with PCP in 1 week.   Follow-up tests/labs Hgb A1c  Follow-up Information     Follow up With Specialties Details Why Contact Ruperto Nava MD Internal Medicine Go on 12/10/2019 For scheduled appointment at 4:15PM  [address] [address]  [address] [place] [address]  [phone]      [place] [address]  This is the provider of your home health services  [address] [place] [address]  [phone]        ________________________________________________________________    Risk of deterioration: Moderate    Condition at Discharge:  Stable  __________________________________________________________________    Disposition  Home with family and home health services    ____________________________________________________________________    Code Status: Full Code  ___________________________________________________________________      Total time in minutes spent coordinating this discharge (includes going over instructions, follow-up, prescriptions, and preparing report for sign off to her PCP) :  31 minutes    Signed:  Yomaira Salas NP

## 2019-12-05 ENCOUNTER — HOME CARE VISIT (OUTPATIENT)
Dept: SCHEDULING | Facility: HOME HEALTH | Age: 79
End: 2019-12-05
Payer: MEDICARE

## 2019-12-05 ENCOUNTER — HOME CARE VISIT (OUTPATIENT)
Dept: HOME HEALTH SERVICES | Facility: HOME HEALTH | Age: 79
End: 2019-12-05

## 2019-12-05 ENCOUNTER — HOME CARE VISIT (OUTPATIENT)
Dept: SCHEDULING | Facility: HOME HEALTH | Age: 79
End: 2019-12-05

## 2019-12-05 ENCOUNTER — PATIENT OUTREACH (OUTPATIENT)
Dept: INTERNAL MEDICINE CLINIC | Age: 79
End: 2019-12-05

## 2019-12-05 PROCEDURE — G0299 HHS/HOSPICE OF RN EA 15 MIN: HCPCS

## 2019-12-05 NOTE — PROGRESS NOTES
Hospital Discharge Follow-Up      Date/Time:  12/5/2019 9:23 AM    Patient was re-admitted to [facility] on 12/1/19 and discharged on 12/4/19 for DKA/cellulitis/Sepsis. The physician discharge summary was available at the time of outreach. Patient was contacted within 1 business days of discharge. Top Challenges reviewed with the provider     [facility] assessed pt 12/5/19- Pt advised [place] that she was not homebound and that she did not want the services offered. Confirmed with pt's daughter, that pt not homebound (goes out for meals and visits with friends). Consider referral to [facility] for outpatient home Physical and Occupational Therapy. - Blood Cultures drawn on 12/1/19 show no growth to date (4 days)    - Macrocytic anemia- Iron panel within normal limits, possibly dilutional from IVFs. - Consider rechecking labs  Results for Sundeep Mijares (MRN [id number]) as of 12/5/2019 09:48   11/22/2019 00:23 12/1/2019 09:22 12/2/2019 04:06 12/3/2019 04:20 12/4/2019 04:24   WBC 6.4 21.3 (H) 17.1 (H) 8.4    NRBC 0.6 (H) 0.0 0.0 0.2 (H)    RBC 3.98 3.27 (L) 3.09 (L) 3.05 (L)    HGB 12.5 10.6 (L) 9.8 (L) 9.7 (L)    Glucose 126 (H) 925 (HH) 117 (H) 64 (L) 129 (H)   Results for Sundeep Mijares (MRN [id number]) as of 12/5/2019 09:48   12/1/2019 09:39 12/1/2019 11:21   Lactic Acid (POC) 7.51 (HH) 7.50 ([place])   Results for Sundeep Mijares (MRN [id number]) as of 12/5/2019 09:48   11/19/2019 23:23 12/1/2019 16:34 12/1/2019 20:10 12/3/2019 04:20   Lactic acid 2.2 (HH) 4.2 (HH) 2.4 (HH) 1.6     - Ortho consulted for possible cellulitis left arm-  CT findings only notable for gas in soft tissue. No abscess. Site is same location as multiple u/s guided iv attempts, and gas almost certainly due to this. Advance Care Planning:   Does patient have an Advance Directive:  does not have - inpt team reviewed with pt and she told them that her children know what she would want.  Will re-assess       Method of communication with provider :chart routing    Inpatient RRAT score: 16- although this CTN feels she is high risk d/t medication and diet adherence, lack of understanding her medical condition and already having one readmission within a week of discharge. Was this a readmission? yes   Patient stated reason for the readmission: 'my blood sugar was high     Care Transition Nurse (CTN) contacted the patient and then daughter, Alisa Mccarthy by telephone to perform post hospital discharge assessment. Verified name and  with both as identifiers. Provided introduction to self, and explanation of the CTN role. Family received hospital discharge instructions. CTN reviewed discharge instructions and red flags with pt and daughter who verbalized understanding. Pt and daughter given an opportunity to ask questions and does not have any further questions or concerns at this time. The family agrees to contact the PCP office for questions related to their healthcare. CTN provided contact information for future reference. Disease Specific:   N/A    Patients top risk factors for readmission:  financial, functional cognitive ability, lack of knowledge about disease, level of motivation, medical condition, medication management, transportation    [address] orders at discharge: PT, SN (OT was recommended by inpt therapist but not ordered)  [address]: [facility]   Date of initial visit: 19- pt confirmed that someone was at the home today, but not sure if it was nurse or PT    [facility] ordered at discharge: none    Medication(s):   New Medications at Discharge:   cephALEXin (KEFLEX) 500 mg capsule Take 1 Cap by mouth two (2) times a day for 5 days. Qty: 10 Cap, Refills: 0     Changed Medications at Discharge: none  Discontinued Medications at Discharge: none    Medication reconciliation was not performed with patient- states that her daughter, Alisa Mccarthy fills her pill boxes for her.  States that they did  abx and Mejia Dural placed them into her pillbox last night. She reports that she administers her own insulin. She couldn't remember the names but was able to state she does \"4 units of the Orange pen before meals and 14 units of the Blue pen in the morning\". Her Tresiba dose is listed as 16 units qday, but her am blood sugar was \"in the 90s\" this morning and d/t pt with increased confusion surrounding meds, I did not correct her. There were no barriers to obtaining medications identified at this time. Referral to Pharm D needed: yes - sent Sharla Boudreaux and update as referral had been placed after initial admission    Current Outpatient Medications   Medication Sig    cephALEXin (KEFLEX) 500 mg capsule Take 1 Cap by mouth two (2) times a day for 5 days.  insulin degludec (TRESIBA FLEXTOUCH U-100) 100 unit/mL (3 mL) inpn 16 Units by SubCUTAneous route daily.  insulin aspart U-100 (NOVOLOG) 100 unit/mL (3 mL) inpn 4 units AC breakfast,lunch, and dinner    levothyroxine (SYNTHROID) 175 mcg tablet TAKE 1 TABLET BY MOUTH EVERY DAY    amLODIPine (NORVASC) 10 mg tablet TAKE 1 TABLET BY MOUTH EVERY DAY IN THE MORNING    pravastatin (PRAVACHOL) 80 mg tablet TAKE 1 TABLET BY MOUTH EVERY DAY    clopidogrel (PLAVIX) 75 mg tab TAKE 1 TAB BY MOUTH DAILY.  metoprolol succinate (TOPROL-XL) 25 mg XL tablet Take 1 Tab by mouth daily.  brimonidine (ALPHAGAN) 0.15 % ophthalmic solution Administer 1 Drop to both eyes two (2) times a day. No current facility-administered medications for this visit. There are no discontinued medications.     [facility] follow up appointment(s):   Future Appointments   Date Time Provider Tawnya Evans   12/5/2019  1:00 PM Edith Mcwilliams, [address] [facility]   12/10/2019  4:15 PM Ellen Hale MD [address]   7/8/2020  1:15 PM Rashad Silverman MD [facility] [facility]      Non-[facility] follow up appointment(s): none    [facility]:  information provided as a resource Goals        Post Hospitalization     Prevent Readmission        12/5/2019  - Pt was readmitted on 12/1/19 for DKA again, along with cellulitis/sepsis left arm  - Discharged on Keflex 500 mg BID x 5 days- daughter placed pills in pt's pillbox  - Encouraged pt to check her blood sugar more frequently and to write down the readings  - Asked pt to tell me what she learned about diabetes in the hospital- states \"II was told that I need to eat less\". - Re-educated pt on not eating less, but instead eating 3 balanced meals/day and a pm snack. She does not like to eat lunch- encouraged pt to try to at least eat half of a sandwich- she likes tuna and peanut butter sandwiches and states she would be able to make this for herself. She did not remember learning about the plate method so I reviewed again. She did remember that she was told she can drink milk and was happy with this. Advised her that a small glass of milk or yogurt could be her evening snack before bed and she liked that idea. - Pt will need continued reinforcement and reeducation surrounding meds and diet d/t her forgetfulness/dementia  - Consider [facility] outpt rehab for PT since she is not homebound- requested for PCP to place referral  - Red flags reviewed with pt's daughter and discussed when to call 911 vs when to contact Dallas Khang or PCP office  MONTSE Pagan RN, [facility]  Care Transitions Nurse   [address] [place] [address] Ambulatory Care Coordination Team  11/25/2019  - Pt will take medications same time each day and be sure to eat 3 meals and snack to prevent hypoglycemia  - PharmD referral placed to aide in further diabetes education  - Pt to f/u with Dr. Vada Dance on 11/26/19  - Was not d/c'd with [facility] Pretty, request for PCP to consider HHPT and SN referral  MONTSE Pagan RN, [facility]  Care Transitions Nurse   [address] [place] [address] Ambulatory Care Coordination Team             Supportive resources in place to maintain patient in the community (ie. Home Health, DME equipment, refer to, medication assistant plan, etc.)        12/5/2019  - Pt with 2 back to back admissions for DKA 2/2 non adherence to diet and medications 2/2 to early dementia  - Does not qualify for [facility] d/t not homebound  - Reviewed [facility] with pt's daughter and expressed interest. I sent request to pt's PCP to place referral for [facility] Patton  - Provided pt's daughter contact information for [facility] to see if pt qualifies for any services/assistance  - Placed referral to [address] [place] [address]. Daughter would prefer for SW to call the week of 12/9 around 1:30 in afternoon if possible as that is when she is on lunch break  - Pt's daughter unsure of what still needs to occur for Medicaid application. States that someone called her today but the information she was requesting needed to come from her mother? ?  - Provided information and contact for Dalals Quiñonez  - May benefit in getting referral to Sarah Briscoe - will review at next call  - Referral to Radha Rubio for future Diabetes education and med assessment  MONTSE Pagan RN, [facility] Ambulatory Care Coordination Team

## 2019-12-07 LAB
BACTERIA SPEC CULT: NORMAL
BACTERIA SPEC CULT: NORMAL
SERVICE CMNT-IMP: NORMAL
SERVICE CMNT-IMP: NORMAL

## 2019-12-13 ENCOUNTER — PATIENT OUTREACH (OUTPATIENT)
Dept: INTERNAL MEDICINE CLINIC | Age: 79
End: 2019-12-13

## 2019-12-13 ENCOUNTER — TELEPHONE (OUTPATIENT)
Dept: FAMILY MEDICINE CLINIC | Age: 79
End: 2019-12-13

## 2019-12-17 ENCOUNTER — PATIENT OUTREACH (OUTPATIENT)
Dept: INTERNAL MEDICINE CLINIC | Age: 79
End: 2019-12-17

## 2019-12-25 ENCOUNTER — HOSPITAL ENCOUNTER (INPATIENT)
Age: 79
LOS: 5 days | Discharge: HOME | DRG: 637 | End: 2019-12-30
Attending: EMERGENCY MEDICINE | Admitting: INTERNAL MEDICINE
Payer: MEDICARE

## 2019-12-25 ENCOUNTER — APPOINTMENT (OUTPATIENT)
Dept: GENERAL RADIOLOGY | Age: 79
DRG: 637 | End: 2019-12-25
Attending: EMERGENCY MEDICINE
Payer: MEDICARE

## 2019-12-25 DIAGNOSIS — R00.0 TACHYCARDIA: ICD-10-CM

## 2019-12-25 DIAGNOSIS — E10.10 DIABETIC KETOACIDOSIS WITHOUT COMA ASSOCIATED WITH TYPE 1 DIABETES MELLITUS (HCC): Primary | ICD-10-CM

## 2019-12-25 LAB
ALBUMIN SERPL-MCNC: 3.7 G/DL (ref 3.5–5)
ALBUMIN/GLOB SERPL: 1.1 {RATIO} (ref 1.1–2.2)
ALP SERPL-CCNC: 128 U/L (ref 45–117)
ALT SERPL-CCNC: 30 U/L (ref 12–78)
ANION GAP SERPL CALC-SCNC: 20 MMOL/L (ref 5–15)
ANION GAP SERPL CALC-SCNC: 27 MMOL/L (ref 5–15)
ANION GAP SERPL CALC-SCNC: 28 MMOL/L (ref 5–15)
APPEARANCE UR: CLEAR
ARTERIAL PATENCY WRIST A: ABNORMAL
AST SERPL-CCNC: 21 U/L (ref 15–37)
BACTERIA URNS QL MICRO: NEGATIVE /HPF
BASOPHILS # BLD: 0 K/UL (ref 0–0.1)
BASOPHILS NFR BLD: 0 % (ref 0–1)
BDY SITE: ABNORMAL
BILIRUB SERPL-MCNC: 0.5 MG/DL (ref 0.2–1)
BILIRUB UR QL: NEGATIVE
BUN SERPL-MCNC: 30 MG/DL (ref 6–20)
BUN SERPL-MCNC: 31 MG/DL (ref 6–20)
BUN SERPL-MCNC: 33 MG/DL (ref 6–20)
BUN/CREAT SERPL: 18 (ref 12–20)
CALCIUM SERPL-MCNC: 8.6 MG/DL (ref 8.5–10.1)
CALCIUM SERPL-MCNC: 9.2 MG/DL (ref 8.5–10.1)
CALCIUM SERPL-MCNC: 9.7 MG/DL (ref 8.5–10.1)
CHLORIDE SERPL-SCNC: 102 MMOL/L (ref 97–108)
CHLORIDE SERPL-SCNC: 109 MMOL/L (ref 97–108)
CHLORIDE SERPL-SCNC: 117 MMOL/L (ref 97–108)
CO2 SERPL-SCNC: 5 MMOL/L (ref 21–32)
CO2 SERPL-SCNC: 5 MMOL/L (ref 21–32)
CO2 SERPL-SCNC: 9 MMOL/L (ref 21–32)
COLOR UR: ABNORMAL
CREAT SERPL-MCNC: 1.64 MG/DL (ref 0.55–1.02)
CREAT SERPL-MCNC: 1.7 MG/DL (ref 0.55–1.02)
CREAT SERPL-MCNC: 1.82 MG/DL (ref 0.55–1.02)
DIFFERENTIAL METHOD BLD: ABNORMAL
EOSINOPHIL # BLD: 0 K/UL (ref 0–0.4)
EOSINOPHIL NFR BLD: 0 % (ref 0–7)
EPITH CASTS URNS QL MICRO: ABNORMAL /LPF
ERYTHROCYTE [DISTWIDTH] IN BLOOD BY AUTOMATED COUNT: 14.2 % (ref 11.5–14.5)
FLUAV AG NPH QL IA: NEGATIVE
FLUBV AG NOSE QL IA: NEGATIVE
GAS FLOW.O2 O2 DELIVERY SYS: ABNORMAL L/MIN
GLOBULIN SER CALC-MCNC: 3.4 G/DL (ref 2–4)
GLUCOSE BLD STRIP.AUTO-MCNC: 155 MG/DL (ref 65–100)
GLUCOSE BLD STRIP.AUTO-MCNC: 245 MG/DL (ref 65–100)
GLUCOSE BLD STRIP.AUTO-MCNC: 338 MG/DL (ref 65–100)
GLUCOSE BLD STRIP.AUTO-MCNC: 509 MG/DL (ref 65–100)
GLUCOSE BLD STRIP.AUTO-MCNC: 523 MG/DL (ref 65–100)
GLUCOSE BLD STRIP.AUTO-MCNC: >600 MG/DL (ref 65–100)
GLUCOSE SERPL-MCNC: 457 MG/DL (ref 65–100)
GLUCOSE SERPL-MCNC: 790 MG/DL (ref 65–100)
GLUCOSE SERPL-MCNC: 870 MG/DL (ref 65–100)
GLUCOSE UR STRIP.AUTO-MCNC: >1000 MG/DL
HCT VFR BLD AUTO: 37.9 % (ref 35–47)
HGB BLD-MCNC: 11.8 G/DL (ref 11.5–16)
HGB UR QL STRIP: NEGATIVE
HYALINE CASTS URNS QL MICRO: ABNORMAL /LPF (ref 0–5)
IMM GRANULOCYTES # BLD AUTO: 0.1 K/UL (ref 0–0.04)
IMM GRANULOCYTES NFR BLD AUTO: 1 % (ref 0–0.5)
KETONES UR QL STRIP.AUTO: 80 MG/DL
LACTATE SERPL-SCNC: 3.5 MMOL/L (ref 0.4–2)
LACTATE SERPL-SCNC: 6.3 MMOL/L (ref 0.4–2)
LEUKOCYTE ESTERASE UR QL STRIP.AUTO: NEGATIVE
LYMPHOCYTES # BLD: 0.9 K/UL (ref 0.8–3.5)
LYMPHOCYTES NFR BLD: 9 % (ref 12–49)
MAGNESIUM SERPL-MCNC: 2.6 MG/DL (ref 1.6–2.4)
MAGNESIUM SERPL-MCNC: 2.6 MG/DL (ref 1.6–2.4)
MAGNESIUM SERPL-MCNC: 2.7 MG/DL (ref 1.6–2.4)
MCH RBC QN AUTO: 32.2 PG (ref 26–34)
MCHC RBC AUTO-ENTMCNC: 31.1 G/DL (ref 30–36.5)
MCV RBC AUTO: 103.6 FL (ref 80–99)
MONOCYTES # BLD: 0.5 K/UL (ref 0–1)
MONOCYTES NFR BLD: 5 % (ref 5–13)
NEUTS SEG # BLD: 8.8 K/UL (ref 1.8–8)
NEUTS SEG NFR BLD: 85 % (ref 32–75)
NITRITE UR QL STRIP.AUTO: NEGATIVE
NRBC # BLD: 0 K/UL (ref 0–0.01)
NRBC BLD-RTO: 0 PER 100 WBC
PCO2 BLDV: <15 MMHG (ref 41–51)
PH BLDV: 7.12 [PH] (ref 7.32–7.42)
PH UR STRIP: 5 [PH] (ref 5–8)
PHOSPHATE SERPL-MCNC: 2.7 MG/DL (ref 2.6–4.7)
PHOSPHATE SERPL-MCNC: 7.7 MG/DL (ref 2.6–4.7)
PHOSPHATE SERPL-MCNC: 8.2 MG/DL (ref 2.6–4.7)
PLATELET # BLD AUTO: 257 K/UL (ref 150–400)
PMV BLD AUTO: 10.9 FL (ref 8.9–12.9)
PO2 BLDV: 41 MMHG (ref 25–40)
POTASSIUM SERPL-SCNC: 4.1 MMOL/L (ref 3.5–5.1)
POTASSIUM SERPL-SCNC: 6.1 MMOL/L (ref 3.5–5.1)
POTASSIUM SERPL-SCNC: 6.9 MMOL/L (ref 3.5–5.1)
PROT SERPL-MCNC: 7.1 G/DL (ref 6.4–8.2)
PROT UR STRIP-MCNC: 30 MG/DL
RBC # BLD AUTO: 3.66 M/UL (ref 3.8–5.2)
RBC #/AREA URNS HPF: ABNORMAL /HPF (ref 0–5)
SERVICE CMNT-IMP: ABNORMAL
SODIUM SERPL-SCNC: 135 MMOL/L (ref 136–145)
SODIUM SERPL-SCNC: 141 MMOL/L (ref 136–145)
SODIUM SERPL-SCNC: 146 MMOL/L (ref 136–145)
SP GR UR REFRACTOMETRY: 1.02 (ref 1–1.03)
SPECIMEN TYPE: ABNORMAL
TOTAL RESP. RATE, ITRR: 40
TROPONIN I SERPL-MCNC: <0.05 NG/ML
UA: UC IF INDICATED,UAUC: ABNORMAL
UROBILINOGEN UR QL STRIP.AUTO: 0.2 EU/DL (ref 0.2–1)
WBC # BLD AUTO: 10.4 K/UL (ref 3.6–11)
WBC URNS QL MICRO: ABNORMAL /HPF (ref 0–4)

## 2019-12-25 PROCEDURE — 83735 ASSAY OF MAGNESIUM: CPT

## 2019-12-25 PROCEDURE — 85025 COMPLETE CBC W/AUTO DIFF WBC: CPT

## 2019-12-25 PROCEDURE — 93005 ELECTROCARDIOGRAM TRACING: CPT

## 2019-12-25 PROCEDURE — 84100 ASSAY OF PHOSPHORUS: CPT

## 2019-12-25 PROCEDURE — 84484 ASSAY OF TROPONIN QUANT: CPT

## 2019-12-25 PROCEDURE — 74011000258 HC RX REV CODE- 258: Performed by: INTERNAL MEDICINE

## 2019-12-25 PROCEDURE — 82803 BLOOD GASES ANY COMBINATION: CPT

## 2019-12-25 PROCEDURE — 87804 INFLUENZA ASSAY W/OPTIC: CPT

## 2019-12-25 PROCEDURE — 74011000250 HC RX REV CODE- 250: Performed by: INTERNAL MEDICINE

## 2019-12-25 PROCEDURE — 36415 COLL VENOUS BLD VENIPUNCTURE: CPT

## 2019-12-25 PROCEDURE — 82962 GLUCOSE BLOOD TEST: CPT

## 2019-12-25 PROCEDURE — 81001 URINALYSIS AUTO W/SCOPE: CPT

## 2019-12-25 PROCEDURE — 99285 EMERGENCY DEPT VISIT HI MDM: CPT

## 2019-12-25 PROCEDURE — 74011636637 HC RX REV CODE- 636/637: Performed by: INTERNAL MEDICINE

## 2019-12-25 PROCEDURE — 80048 BASIC METABOLIC PNL TOTAL CA: CPT

## 2019-12-25 PROCEDURE — 96374 THER/PROPH/DIAG INJ IV PUSH: CPT

## 2019-12-25 PROCEDURE — 74011250636 HC RX REV CODE- 250/636: Performed by: INTERNAL MEDICINE

## 2019-12-25 PROCEDURE — 65660000000 HC RM CCU STEPDOWN

## 2019-12-25 PROCEDURE — 83605 ASSAY OF LACTIC ACID: CPT

## 2019-12-25 PROCEDURE — 96375 TX/PRO/DX INJ NEW DRUG ADDON: CPT

## 2019-12-25 PROCEDURE — 74011636637 HC RX REV CODE- 636/637: Performed by: EMERGENCY MEDICINE

## 2019-12-25 PROCEDURE — 74011000258 HC RX REV CODE- 258: Performed by: EMERGENCY MEDICINE

## 2019-12-25 PROCEDURE — 87040 BLOOD CULTURE FOR BACTERIA: CPT

## 2019-12-25 PROCEDURE — 83036 HEMOGLOBIN GLYCOSYLATED A1C: CPT

## 2019-12-25 PROCEDURE — 80053 COMPREHEN METABOLIC PANEL: CPT

## 2019-12-25 PROCEDURE — 71045 X-RAY EXAM CHEST 1 VIEW: CPT

## 2019-12-25 PROCEDURE — 74011250636 HC RX REV CODE- 250/636: Performed by: EMERGENCY MEDICINE

## 2019-12-25 RX ORDER — MAGNESIUM SULFATE 100 %
4 CRYSTALS MISCELLANEOUS AS NEEDED
Status: DISCONTINUED | OUTPATIENT
Start: 2019-12-25 | End: 2019-12-26

## 2019-12-25 RX ORDER — SODIUM CHLORIDE 0.9 % (FLUSH) 0.9 %
5-40 SYRINGE (ML) INJECTION EVERY 8 HOURS
Status: DISCONTINUED | OUTPATIENT
Start: 2019-12-25 | End: 2019-12-30 | Stop reason: HOSPADM

## 2019-12-25 RX ORDER — DEXTROSE MONOHYDRATE 100 MG/ML
0-250 INJECTION, SOLUTION INTRAVENOUS AS NEEDED
Status: DISCONTINUED | OUTPATIENT
Start: 2019-12-25 | End: 2019-12-25

## 2019-12-25 RX ORDER — INSULIN LISPRO 100 [IU]/ML
INJECTION, SOLUTION INTRAVENOUS; SUBCUTANEOUS
Status: DISCONTINUED | OUTPATIENT
Start: 2019-12-25 | End: 2019-12-25

## 2019-12-25 RX ORDER — HEPARIN SODIUM 5000 [USP'U]/ML
5000 INJECTION, SOLUTION INTRAVENOUS; SUBCUTANEOUS EVERY 8 HOURS
Status: DISCONTINUED | OUTPATIENT
Start: 2019-12-25 | End: 2019-12-30 | Stop reason: HOSPADM

## 2019-12-25 RX ORDER — MAGNESIUM SULFATE 100 %
4 CRYSTALS MISCELLANEOUS AS NEEDED
Status: DISCONTINUED | OUTPATIENT
Start: 2019-12-25 | End: 2019-12-25

## 2019-12-25 RX ORDER — INSULIN LISPRO 100 [IU]/ML
INJECTION, SOLUTION INTRAVENOUS; SUBCUTANEOUS
Status: DISCONTINUED | OUTPATIENT
Start: 2019-12-26 | End: 2019-12-26

## 2019-12-25 RX ORDER — CLOPIDOGREL BISULFATE 75 MG/1
75 TABLET ORAL DAILY
Status: DISCONTINUED | OUTPATIENT
Start: 2019-12-26 | End: 2019-12-30 | Stop reason: HOSPADM

## 2019-12-25 RX ORDER — PRAVASTATIN SODIUM 40 MG/1
80 TABLET ORAL DAILY
Status: DISCONTINUED | OUTPATIENT
Start: 2019-12-26 | End: 2019-12-30 | Stop reason: HOSPADM

## 2019-12-25 RX ORDER — DOCUSATE SODIUM 100 MG/1
100 CAPSULE, LIQUID FILLED ORAL 2 TIMES DAILY
Status: DISCONTINUED | OUTPATIENT
Start: 2019-12-25 | End: 2019-12-30 | Stop reason: HOSPADM

## 2019-12-25 RX ORDER — CALCIUM GLUCONATE 94 MG/ML
1 INJECTION, SOLUTION INTRAVENOUS
Status: COMPLETED | OUTPATIENT
Start: 2019-12-25 | End: 2019-12-25

## 2019-12-25 RX ORDER — AMLODIPINE BESYLATE 5 MG/1
5 TABLET ORAL DAILY
Status: DISCONTINUED | OUTPATIENT
Start: 2019-12-26 | End: 2019-12-30 | Stop reason: HOSPADM

## 2019-12-25 RX ORDER — SODIUM CHLORIDE 0.9 % (FLUSH) 0.9 %
5-40 SYRINGE (ML) INJECTION AS NEEDED
Status: DISCONTINUED | OUTPATIENT
Start: 2019-12-25 | End: 2019-12-30 | Stop reason: HOSPADM

## 2019-12-25 RX ORDER — METOPROLOL SUCCINATE 25 MG/1
25 TABLET, EXTENDED RELEASE ORAL DAILY
Status: DISCONTINUED | OUTPATIENT
Start: 2019-12-26 | End: 2019-12-30 | Stop reason: HOSPADM

## 2019-12-25 RX ORDER — SODIUM BICARBONATE 84 MG/ML
100 INJECTION, SOLUTION INTRAVENOUS
Status: DISCONTINUED | OUTPATIENT
Start: 2019-12-25 | End: 2019-12-25

## 2019-12-25 RX ORDER — SODIUM CHLORIDE 9 MG/ML
150 INJECTION, SOLUTION INTRAVENOUS CONTINUOUS
Status: DISCONTINUED | OUTPATIENT
Start: 2019-12-25 | End: 2019-12-25

## 2019-12-25 RX ADMIN — SODIUM CHLORIDE 1000 ML: 900 INJECTION, SOLUTION INTRAVENOUS at 19:33

## 2019-12-25 RX ADMIN — SODIUM CHLORIDE 1000 ML: 900 INJECTION, SOLUTION INTRAVENOUS at 15:22

## 2019-12-25 RX ADMIN — SODIUM BICARBONATE: 84 INJECTION, SOLUTION INTRAVENOUS at 23:35

## 2019-12-25 RX ADMIN — Medication 10 ML: at 22:00

## 2019-12-25 RX ADMIN — SODIUM CHLORIDE 26.9 UNITS/HR: 9 INJECTION, SOLUTION INTRAVENOUS at 20:35

## 2019-12-25 RX ADMIN — SODIUM CHLORIDE 10.8 UNITS/HR: 9 INJECTION, SOLUTION INTRAVENOUS at 15:35

## 2019-12-25 RX ADMIN — SODIUM CHLORIDE 16.2 UNITS/HR: 9 INJECTION, SOLUTION INTRAVENOUS at 16:44

## 2019-12-25 RX ADMIN — SODIUM CHLORIDE 1000 ML: 900 INJECTION, SOLUTION INTRAVENOUS at 18:15

## 2019-12-25 RX ADMIN — SODIUM CHLORIDE 150 ML/HR: 900 INJECTION, SOLUTION INTRAVENOUS at 17:44

## 2019-12-25 RX ADMIN — HEPARIN SODIUM 5000 UNITS: 5000 INJECTION INTRAVENOUS; SUBCUTANEOUS at 22:07

## 2019-12-25 RX ADMIN — SODIUM BICARBONATE: 84 INJECTION, SOLUTION INTRAVENOUS at 19:34

## 2019-12-25 RX ADMIN — CALCIUM GLUCONATE 1 G: 98 INJECTION, SOLUTION INTRAVENOUS at 15:22

## 2019-12-25 RX ADMIN — SODIUM CHLORIDE 23.2 UNITS/HR: 9 INJECTION, SOLUTION INTRAVENOUS at 18:52

## 2019-12-25 RX ADMIN — SODIUM CHLORIDE 1000 ML: 900 INJECTION, SOLUTION INTRAVENOUS at 14:03

## 2019-12-25 NOTE — H&P
Hospitalist Admission Note    NAME: Jamaica Jesus   :  1940   MRN:  [id number]     Date/Time:  2019 4:07 PM    Patient PCP: Opal Ruvalcaba MD  ______________________________________________________________________  Given the patient's current clinical presentation, I have a high level of concern for decompensation if discharged from the emergency department. Complex decision making was performed, which includes reviewing the patient's available past medical records, laboratory results, and x-ray films. My assessment of this patient's clinical condition and my plan of care is as follows. Assessment / Plan:    DKA  -likely secondary to non-compliance. No sign of MI, infections or stroke.   -on admission blood glucose 870, bicarb 5, AG 28    -f/u Troponin, A1c, UA  -started on DKA protocol, currently insulin gtt with NS  -bridge with home regimen (16 units of Lantus and correction scale) upon AG closing x2  - NPO for now  - 1/2NS with bicarb at 150cc/hr  -2 Amps of bicarb were given in the ER  - BMP q4hr, Mag, Po4 q4hr  -Consult diabetic management to assess home compliance and home regimen    HAGMA  -on admission bicarb 5 and AG 28  -secondary to diabetic ketoacidosis  -Follow lactic acid levels  - f/u BMP    Acute encephalopathy  -Metabolic encephalopathy secondary to DKA  -Follow DKA protocol as above  -Assess mentation  -If no improvement consider further work-up including CT head although there is no -focal neurologic deficits on admission    Lactic acidosis  -Type a secondary to dehydration  -Initial lactic acid 6.3  -Follow lactic acid every 6 hours till resolved and stop checking  -IV fluids as above    Acute kidney injury  -Prerenal from dehydration  -Continue IV fluids as above  -Follow-up BMP    History of hypothyroidism  -Continue Synthroid at home dose    History of CVA  -Continue Plavix and statins    History of hypertension  -Continue Norvasc and metoprolol      Code Status: Full code  Surrogate Decision Maker: Patient would like to talk about later    DVT Prophylaxis: Heparin  GI Prophylaxis: not indicated    Baseline: Active and she lives by herself      Subjective:   CHIEF COMPLAINT: High blood sugar    HISTORY OF PRESENT ILLNESS:       The patient is 69years old -American female with past medical history uncontrolled diabetes mellitus type 2 on insulin, hypertension, hypothyroidism, CVA presented to the emergency department by EMS because she was having high readings on her blood sugar at home. Patient reported also that she was having abdominal pain, nausea and vomiting in the last few days and she has not been using her medications as well for that reason. Patient had multiple admissions to the hospital last one early December 2019 with the same presentation and she was always in DKA. Patient denied any shortness of breath, chest pain, dizziness, palpitations, runny nose, sore throat. In the emergency department, patient was found to be in DKA with severe high anion gap metabolic acidosis for which she was started on DKA protocol and given IV fluids. We were asked to admit for work up and evaluation of the above problems.      Past Medical History:   Diagnosis Date    Diabetes ([address]     Heart failure ([address]     unknown to family    Hypercholesteremia     Hypertension     [facility] [address] [address]     Thyroid disease         Past Surgical History:   Procedure Laterality Date    HX GYN      HX HEENT      thyroidectomy    HX HYSTERECTOMY      REMOVAL GALLBLADDER      THYROIDECTOMY         Social History     Tobacco Use    Smoking status: Never Smoker    Smokeless tobacco: Never Used   Substance Use Topics    Alcohol use: No     Comment: been years        Family History   Problem Relation Age of Onset    Diabetes Father     No Known Problems Mother      No Known Allergies     Prior to Admission medications    Medication Sig Start Date End Date Taking? Authorizing Provider   insulin degludec (TRESIBA FLEXTOUCH U-100) 100 unit/mL (3 mL) inpn 16 Units by SubCUTAneous route daily. Provider, Historical   levothyroxine (SYNTHROID) 175 mcg tablet TAKE 1 TABLET BY MOUTH EVERY DAY 11/22/19   Nellie Colorado MD   amLODIPine (NORVASC) 10 mg tablet TAKE 1 TABLET BY MOUTH EVERY DAY IN THE MORNING 11/3/19   Nellie Colorado MD   pravastatin (PRAVACHOL) 80 mg tablet TAKE 1 TABLET BY MOUTH EVERY DAY 11/3/19   Nellie Colorado MD   clopidogrel (PLAVIX) 75 mg tab TAKE 1 TAB BY MOUTH DAILY. 4/10/19   Provider, Historical   metoprolol succinate (TOPROL-XL) 25 mg XL tablet Take 1 Tab by mouth daily. 6/15/19   Nellie Colorado MD   insulin aspart U-100 (NOVOLOG) 100 unit/mL (3 mL) inpn 4 units AC breakfast,lunch, and dinner 6/15/19   Nellie Colorado MD   brimonidine (ALPHAGAN) 0.15 % ophthalmic solution Administer 1 Drop to both eyes two (2) times a day. 1/30/15   Provider, Historical       REVIEW OF SYSTEMS:     I am not able to complete the review of systems because:    The patient is intubated and sedated    The patient has altered mental status due to his acute medical problems    The patient has baseline aphasia from prior stroke(s)    The patient has baseline dementia and is not reliable historian    The patient is in acute medical distress and unable to provide information           Total of 12 systems reviewed as follows:       POSITIVE= underlined text  Negative = text not underlined  General:  fever, chills, sweats, generalized weakness, weight loss/gain,      loss of appetite   Eyes:    blurred vision, eye pain, loss of vision, double vision  ENT:    rhinorrhea, pharyngitis   Respiratory:   cough, sputum production, SOB, AGUILERA, wheezing, pleuritic pain   Cardiology:   chest pain, palpitations, orthopnea, PND, edema, syncope   Gastrointestinal:  abdominal pain , N/V, diarrhea, dysphagia, constipation, bleeding   Genitourinary:  frequency, urgency, dysuria, hematuria, incontinence   Muskuloskeletal :  arthralgia, myalgia, back pain  Hematology:  easy bruising, nose or gum bleeding, lymphadenopathy   Dermatological: rash, ulceration, pruritis, color change / jaundice  Endocrine:   hot flashes or polydipsia   Neurological:  headache, dizziness, confusion, focal weakness, paresthesia,     Speech difficulties, memory loss, gait difficulty  Psychological: Feelings of anxiety, depression, agitation    Objective:   VITALS:    Visit Vitals  /42   Pulse (!) 114   Temp 98.7 °F (37.1 °C)   Resp 28   SpO2 100%       PHYSICAL EXAM:    General:    Alert, cooperative, no distress, appears stated age. HEENT: Atraumatic, anicteric sclerae, pink conjunctivae     No oral ulcers, mucosa moist, throat clear, dentition fair  Neck:  Supple, symmetrical,  thyroid: non tender  Lungs:   Clear to auscultation bilaterally. No Wheezing or Rhonchi. No rales. Chest wall:  No tenderness  No Accessory muscle use. Heart:   Regular  rhythm,  No  murmur   No edema  Abdomen:   Soft, non-tender. Not distended. Bowel sounds normal  Extremities: No cyanosis. No clubbing,      Skin turgor normal, Capillary refill normal, Radial dial pulse 2+  Skin:     Not pale. Not Jaundiced  No rashes   Psych:  Good insight. Not depressed. Not anxious or agitated. Neurologic: EOMs intact. No facial asymmetry. No aphasia or slurred speech. Symmetrical strength, Sensation grossly intact.  Alert and oriented X 4.     _______________________________________________________________________  Care Plan discussed with:    Comments   Patient x    Family      RN x    Care Manager                    Consultant:      _______________________________________________________________________  Expected  Disposition:   Home with Family x   HH/PT/OT/RN    SNF/LTC    TAYA    ________________________________________________________________________  TOTAL TIME:  36 Minutes    Critical Care Provided     Minutes non procedure based      Comments    x Reviewed previous records   >50% of visit spent in counseling and coordination of care x Discussion with patient and/or family and questions answered       ________________________________________________________________________  Signed: Adán Alexandra MD    Procedures: see electronic medical records for all procedures/Xrays and details which were not copied into this note but were reviewed prior to creation of Plan. LAB DATA REVIEWED:    Recent Results (from the past 24 hour(s))   GLUCOSE, POC    Collection Time: 12/25/19  1:42 PM   Result Value Ref Range    Glucose (POC) >600 (HH) 65 - 100 mg/dL    Performed by Mike Lopez    GLUCOSE, POC    Collection Time: 12/25/19  1:43 PM   Result Value Ref Range    Glucose (POC) >600 (HH) 65 - 100 mg/dL    Performed by Mike Lopez    CBC WITH AUTOMATED DIFF    Collection Time: 12/25/19  1:46 PM   Result Value Ref Range    WBC 10.4 3.6 - 11.0 K/uL    RBC 3.66 (L) 3.80 - 5.20 M/uL    HGB 11.8 11.5 - 16.0 g/dL    HCT 37.9 35.0 - 47.0 %    .6 (H) 80.0 - 99.0 FL    MCH 32.2 26.0 - 34.0 PG    MCHC 31.1 30.0 - 36.5 g/dL    RDW 14.2 11.5 - 14.5 %    PLATELET 829 877 - 388 K/uL    MPV 10.9 8.9 - 12.9 FL    NRBC 0.0 0  WBC    ABSOLUTE NRBC 0.00 0.00 - 0.01 K/uL    NEUTROPHILS 85 (H) 32 - 75 %    LYMPHOCYTES 9 (L) 12 - 49 %    MONOCYTES 5 5 - 13 %    EOSINOPHILS 0 0 - 7 %    BASOPHILS 0 0 - 1 %    IMMATURE GRANULOCYTES 1 (H) 0.0 - 0.5 %    ABS. NEUTROPHILS 8.8 (H) 1.8 - 8.0 K/UL    ABS. LYMPHOCYTES 0.9 0.8 - 3.5 K/UL    ABS. MONOCYTES 0.5 0.0 - 1.0 K/UL    ABS. EOSINOPHILS 0.0 0.0 - 0.4 K/UL    ABS. BASOPHILS 0.0 0.0 - 0.1 K/UL    ABS. IMM.  GRANS. 0.1 (H) 0.00 - 0.04 K/UL    DF AUTOMATED     METABOLIC PANEL, COMPREHENSIVE    Collection Time: 12/25/19  1:46 PM   Result Value Ref Range    Sodium 135 (L) 136 - 145 mmol/L    Potassium 6.9 (HH) 3.5 - 5.1 mmol/L    Chloride 102 97 - 108 mmol/L    CO2 5 (LL) 21 - 32 mmol/L    Anion gap 28 (H) 5 - 15 mmol/L Glucose 870 (HH) 65 - 100 mg/dL    BUN 31 (H) 6 - 20 MG/DL    Creatinine 1.70 (H) 0.55 - 1.02 MG/DL    BUN/Creatinine ratio 18 12 - 20      GFR est AA 35 (L) >60 ml/min/1.73m2    GFR est non-AA 29 (L) >60 ml/min/1.73m2    Calcium 9.7 8.5 - 10.1 MG/DL    Bilirubin, total 0.5 0.2 - 1.0 MG/DL    ALT (SGPT) 30 12 - 78 U/L    AST (SGOT) 21 15 - 37 U/L    Alk. phosphatase 128 (H) 45 - 117 U/L    Protein, total 7.1 6.4 - 8.2 g/dL    Albumin 3.7 3.5 - 5.0 g/dL    Globulin 3.4 2.0 - 4.0 g/dL    A-G Ratio 1.1 1.1 - 2.2     PHOSPHORUS    Collection Time: 12/25/19  1:46 PM   Result Value Ref Range    Phosphorus 7.7 (H) 2.6 - 4.7 MG/DL   MAGNESIUM    Collection Time: 12/25/19  1:46 PM   Result Value Ref Range    Magnesium 2.6 (H) 1.6 - 2.4 mg/dL   INFLUENZA A & B AG (RAPID TEST)    Collection Time: 12/25/19  2:23 PM   Result Value Ref Range    Influenza A Antigen NEGATIVE  NEG      Influenza B Antigen NEGATIVE  NEG     POC VENOUS BLOOD GAS    Collection Time: 12/25/19  2:34 PM   Result Value Ref Range    Device: ROOM AIR      pH, venous (POC) 7.123 (LL) 7.32 - 7.42      pCO2, venous (POC) <15.0 (L) 41 - 51 MMHG    pO2, venous (POC) 41 (H) 25 - 40 mmHg    Allens test (POC) N/A      Total resp.  rate 40      Site OTHER      Specimen type (POC) VENOUS BLOOD     EKG, 12 LEAD, INITIAL    Collection Time: 12/25/19  3:00 PM   Result Value Ref Range    Ventricular Rate 112 BPM    Atrial Rate 112 BPM    P-R Interval 152 ms    QRS Duration 90 ms    Q-T Interval 346 ms    QTC Calculation (Bezet) 472 ms    Calculated P Axis 51 degrees    Calculated R Axis -56 degrees    Calculated T Axis 37 degrees    Diagnosis       Sinus tachycardia with occasional premature ventricular complexes  Left axis deviation  Anterior infarct (cited on or before 25-DEC-2019)  When compared with ECG of 01-DEC-2019 09:48,  premature ventricular complexes are now present  premature supraventricular complexes are no longer present  Questionable change in initial forces of Lateral leads     GLUCOSE, POC    Collection Time: 12/25/19  3:35 PM   Result Value Ref Range    Glucose (POC) >600 (HH) 65 - 100 mg/dL    Performed by Oxana Torres RN (traveler)

## 2019-12-25 NOTE — ED PROVIDER NOTES
EMERGENCY DEPARTMENT HISTORY AND PHYSICAL EXAM      Date: 12/25/2019  Patient Name: Victoriano Cunningham  Patient Age and Sex: 69 y.o. female     History of Presenting Illness     Chief Complaint   Patient presents with    High Blood Sugar     just read high for EMS, reports she took her insulin today       History Provided By: Patient    HPI: Victoriano Cunningham Is a 71-year-old female with past medical history of diabetes, heart failure, hypertension, and prior stroke presenting today with hyperglycemia. The patient states that she feels terrible, but is unable provide much further information. She reports that she took her insulin today, and that her blood sugar read high. EMS reports that they also got a reading of 5. The patient denies any chest pain, shortness of breath, cough, abdominal pain, dysuria, vomiting. There are no other complaints, changes, or physical findings at this time. PCP: Marisa Knight MD    No current facility-administered medications on file prior to encounter. Current Outpatient Medications on File Prior to Encounter   Medication Sig Dispense Refill    insulin degludec (TRESIBA FLEXTOUCH U-100) 100 unit/mL (3 mL) inpn 16 Units by SubCUTAneous route daily.  levothyroxine (SYNTHROID) 175 mcg tablet TAKE 1 TABLET BY MOUTH EVERY DAY 90 Tab 3    amLODIPine (NORVASC) 10 mg tablet TAKE 1 TABLET BY MOUTH EVERY DAY IN THE MORNING 90 Tab 3    pravastatin (PRAVACHOL) 80 mg tablet TAKE 1 TABLET BY MOUTH EVERY DAY 90 Tab 3    clopidogrel (PLAVIX) 75 mg tab TAKE 1 TAB BY MOUTH DAILY. 11    metoprolol succinate (TOPROL-XL) 25 mg XL tablet Take 1 Tab by mouth daily. 30 Tab 11    insulin aspart U-100 (NOVOLOG) 100 unit/mL (3 mL) inpn 4 units AC breakfast,lunch, and dinner 1 Adjustable Dose Pre-filled Pen Syringe 11    brimonidine (ALPHAGAN) 0.15 % ophthalmic solution Administer 1 Drop to both eyes two (2) times a day.          Past History     Past Medical History:  Past Medical History:   Diagnosis Date    [facility] [address]     Heart failure ([facility] [address]     unknown to family    Hypercholesteremia     Hypertension     [facility]     Thyroid disease        Past Surgical History:  Past Surgical History:   Procedure Laterality Date    HX GYN      HX HEENT      thyroidectomy    HX HYSTERECTOMY      REMOVAL GALLBLADDER      THYROIDECTOMY         Family History:  Family History   Problem Relation Age of Onset    Diabetes Father     No Known Problems Mother        Social History:  Social History     Tobacco Use    Smoking status: Never Smoker    Smokeless tobacco: Never Used   Substance Use Topics    Alcohol use: No     Comment: been years    Drug use: No       Allergies:  No Known Allergies      Review of Systems   Constitutional: No  fever,  +  headache  Skin: No  rash, No  jaundice  HEENT: No  nasal congestion, No  eye drainage. Resp: No cough,  No  wheezing  CV: No chest pain, No  palpitations  GI: No vomiting,  No  diarrhea.,  No  constipation  : No dysuria,  No  hematuria  MSK: No joint pain,  No  trauma  Neuro: No numbness, No  tingling  Psych: No suicidal, No  paranoid      Physical Exam     Patient Vitals for the past 12 hrs:   Temp Pulse Resp BP SpO2   12/25/19 1445  (!) 114 28 151/42 100 %   12/25/19 1430  (!) 114 (!) 34 140/52 100 %   12/25/19 1415  (!) 111 (!) 35 153/46 100 %   12/25/19 1338 98.7 °F (37.1 °C) (!) 115 25 161/52 98 %     General: alert, No acute distress  Eyes: EOMI, normal conjunctiva  ENT: dry mucous membranes. Neck: Active, full ROM of neck. Skin: No rashes. no jaundice              Lungs: Equal chest expansion. mild respiratory distress. clear to auscultation bilaterally No accessory muscle usage, tachypnea, shallow respirations  Heart: tachycardic with a  regular rhythm     no peripheral edema   2+ radial pulses and DPs bilaterally  Abd:  non distended soft, nontender. No rebound tenderness. No guarding  Back: Full ROM  MSK: Full, active ROM in all 4 extremities. Neuro: Person, Place and Time; normal speech;   Psych: Cooperative with exam; Appropriate mood and affect             Diagnostic Study Results     Labs -     Recent Results (from the past 12 hour(s))   GLUCOSE, POC    Collection Time: 12/25/19  1:42 PM   Result Value Ref Range    Glucose (POC) >600 (HH) 65 - 100 mg/dL    Performed by Elaine Landin    GLUCOSE, POC    Collection Time: 12/25/19  1:43 PM   Result Value Ref Range    Glucose (POC) >600 (HH) 65 - 100 mg/dL    Performed by Elaine Landin    CBC WITH AUTOMATED DIFF    Collection Time: 12/25/19  1:46 PM   Result Value Ref Range    WBC 10.4 3.6 - 11.0 K/uL    RBC 3.66 (L) 3.80 - 5.20 M/uL    HGB 11.8 11.5 - 16.0 g/dL    HCT 37.9 35.0 - 47.0 %    .6 (H) 80.0 - 99.0 FL    MCH 32.2 26.0 - 34.0 PG    MCHC 31.1 30.0 - 36.5 g/dL    RDW 14.2 11.5 - 14.5 %    PLATELET 032 119 - 187 K/uL    MPV 10.9 8.9 - 12.9 FL    NRBC 0.0 0  WBC    ABSOLUTE NRBC 0.00 0.00 - 0.01 K/uL    NEUTROPHILS 85 (H) 32 - 75 %    LYMPHOCYTES 9 (L) 12 - 49 %    MONOCYTES 5 5 - 13 %    EOSINOPHILS 0 0 - 7 %    BASOPHILS 0 0 - 1 %    IMMATURE GRANULOCYTES 1 (H) 0.0 - 0.5 %    ABS. NEUTROPHILS 8.8 (H) 1.8 - 8.0 K/UL    ABS. LYMPHOCYTES 0.9 0.8 - 3.5 K/UL    ABS. MONOCYTES 0.5 0.0 - 1.0 K/UL    ABS. EOSINOPHILS 0.0 0.0 - 0.4 K/UL    ABS. BASOPHILS 0.0 0.0 - 0.1 K/UL    ABS. IMM.  GRANS. 0.1 (H) 0.00 - 0.04 K/UL    DF AUTOMATED     METABOLIC PANEL, COMPREHENSIVE    Collection Time: 12/25/19  1:46 PM   Result Value Ref Range    Sodium 135 (L) 136 - 145 mmol/L    Potassium 6.9 (HH) 3.5 - 5.1 mmol/L    Chloride 102 97 - 108 mmol/L    CO2 5 (LL) 21 - 32 mmol/L    Anion gap 28 (H) 5 - 15 mmol/L    Glucose 870 (HH) 65 - 100 mg/dL    BUN 31 (H) 6 - 20 MG/DL    Creatinine 1.70 (H) 0.55 - 1.02 MG/DL    BUN/Creatinine ratio 18 12 - 20      GFR est AA 35 (L) >60 ml/min/1.73m2    GFR est non-AA 29 (L) >60 ml/min/1.73m2    Calcium 9.7 8.5 - 10.1 MG/DL    Bilirubin, total 0.5 0.2 - 1.0 MG/DL ALT (SGPT) 30 12 - 78 U/L    AST (SGOT) 21 15 - 37 U/L    Alk. phosphatase 128 (H) 45 - 117 U/L    Protein, total 7.1 6.4 - 8.2 g/dL    Albumin 3.7 3.5 - 5.0 g/dL    Globulin 3.4 2.0 - 4.0 g/dL    A-G Ratio 1.1 1.1 - 2.2     PHOSPHORUS    Collection Time: 12/25/19  1:46 PM   Result Value Ref Range    Phosphorus 7.7 (H) 2.6 - 4.7 MG/DL   MAGNESIUM    Collection Time: 12/25/19  1:46 PM   Result Value Ref Range    Magnesium 2.6 (H) 1.6 - 2.4 mg/dL   INFLUENZA A & B AG (RAPID TEST)    Collection Time: 12/25/19  2:23 PM   Result Value Ref Range    Influenza A Antigen NEGATIVE  NEG      Influenza B Antigen NEGATIVE  NEG     POC VENOUS BLOOD GAS    Collection Time: 12/25/19  2:34 PM   Result Value Ref Range    Device: ROOM AIR      pH, venous (POC) 7.123 (LL) 7.32 - 7.42      pCO2, venous (POC) <15.0 (L) 41 - 51 MMHG    pO2, venous (POC) 41 (H) 25 - 40 mmHg    Allens test (POC) N/A      Total resp. rate 40      Site OTHER      Specimen type (POC) VENOUS BLOOD     EKG, 12 LEAD, INITIAL    Collection Time: 12/25/19  3:00 PM   Result Value Ref Range    Ventricular Rate 112 BPM    Atrial Rate 112 BPM    P-R Interval 152 ms    QRS Duration 90 ms    Q-T Interval 346 ms    QTC Calculation (Bezet) 472 ms    Calculated P Axis 51 degrees    Calculated R Axis -56 degrees    Calculated T Axis 37 degrees    Diagnosis       Sinus tachycardia with occasional premature ventricular complexes  Left axis deviation  Anterior infarct (cited on or before 25-DEC-2019)  When compared with ECG of 01-DEC-2019 09:48,  premature ventricular complexes are now present  premature supraventricular complexes are no longer present  Questionable change in initial forces of Lateral leads         Radiologic Studies -   XR CHEST PORT   Final Result   IMPRESSION: No acute findings. CT Results  (Last 48 hours)    None        CXR Results  (Last 48 hours)               12/25/19 1512  XR CHEST PORT Final result    Impression:  IMPRESSION: No acute findings. Narrative:  EXAM: XR CHEST PORT       INDICATION: cp       COMPARISON: December 2, 2019       FINDINGS: A portable AP radiograph of the chest was obtained at 1449 hours. The   patient is on a cardiac monitor. The lungs are clear. Cardiomediastinal contours   are stable. The bones and soft tissues are grossly within normal limits. Medical Decision Making     Differential Diagnosis: DKA, altered mental status, electrolyte derangement, pneumonia    I reviewed the vital signs, available nursing notes, past medical history, past surgical history, family history and social history and old medical records. On my interpretation, Laboratory workup is significant for glucose is 870 with evidence of a anion gap metabolic acidosis CO2 is 5, anion gap is 28, potassium is hyperkalemic 6.9, AK I with creatinine 1.7, pH is 7.12  On my interpretation of the radiology studies chest x-ray shows no evidence of acute abnormality, no pneumonia  On my interpretation of the EKG sinus tachycardia with a rate of 112, PVCs, QTC is 472, no ST elevation or depression    Management/ED course: Patient presents today with altered mental status and glucose reading of high. Laboratory studies are consistent with DKA. Patient is treated with 2 A of bicarb, 2 L IV fluids initially, and she is placed on an insulin drip. She is minimally altered and is able answer most questions. It appears that her predisposing factor for DKA is medication noncompliance. Hemoglobin A1c at last check was greater than 14%. Ultimately patient is admitted to the hospitalist service. ED Course:   Initial assessment performed. The patients presenting problems have been discussed, and they are in agreement with the care plan formulated and outlined with them. I have encouraged them to ask questions as they arise throughout their visit.          Procedures:    Critical Care Time:   CRITICAL CARE NOTE :    2:19 PM    IMPENDING DETERIORATION -Metabolic  ASSOCIATED RISK FACTORS - Metabolic changes and Dehydration  MANAGEMENT- Bedside Assessment and Supervision of Care  INTERPRETATION -  Xrays, ECG and Blood Pressure  INTERVENTIONS - Metobolic interventions  CASE REVIEW - Hospitalist and Nursing  TREATMENT RESPONSE -Improved  PERFORMED BY - Self    NOTES   :    I have spent 50 minutes of critical care time involved in lab review, consultations with specialist, family decision- making, bedside attention and documentation. During this entire length of time I was immediately available to the patient . Disposition: Admitted    Admission Note:  Patient is being admitted to the hospital by Service: Hospitalist.  The results of their tests and reasons for their admission have been discussed with them and available family. They convey agreement and understanding for the need to be admitted and for their admission diagnosis. Diagnosis     Clinical Impression:   1. Diabetic ketoacidosis without coma associated with type 1 diabetes mellitus ([facility] [address]    2. Tachycardia        Attestations:  Ramiro Dubon MD        Please note that this dictation was completed with Kab[employer]le, the computer voice recognition software. Quite often unanticipated grammatical, syntax, homophones, and other interpretive errors are inadvertently transcribed by the computer software. Please disregard these errors. Please excuse any errors that have escaped final proofreading. Thank you.

## 2019-12-25 NOTE — ED NOTES
1515 -- First encounter with pt. Alert in stretcher. Able to answer some questions correctly. Waiting for insulin bag from pharmacy. 1620 -- Pt attempting to walk to bathroom. Redirected back to stretcher. Medications continue to run. [phone] -- Son st bedside. Updated on POC. 1820 -- Lab called about critical results. MD Garcia aware. Awaiting new orders. 2015 -- Lab called with critical lactic acid and CO2. MD Garcia notified    5006 -- MD Garcia aware of BGL. Will change IV fluid per order. 0100 --- Pt remains on insulin drip per order. BGL remain stable. 0300 -- Handoff report given to VIN Sesay. Aware of medication infusions, BMP redraw for 0400 and last BGL check.

## 2019-12-25 NOTE — PROGRESS NOTES
Pharmacy Clarification of the Prior to Admission Medication Regimen Retrospective to the Admission Medication Reconciliation-Follow Up Needed    The patient was unable to participate in interview regarding clarification of the prior to admission medication regimen due to AMS. .    No family/friends were present in the room at time of interview. MHT called the patient's emergency contact, patient's daughter, Reji Monteiro [phone], who was able to verify the patient's PTA medications. Information Obtained From: Patient's daughter, RX Query    Recommendations/Findings: The following amendments were made to the patient's active medication list on file at [facility]:     1) Additions: NONE    2) Removals: NONE    3) Changes: NONE    4) Pertinent Pharmacy Findings:   Patient's daughter was unable to verify the last doses administered. Per patient's daughter, the patient's daughter fills the patient's pill organizer, and the patient is to self-administer her medications. PTA medication list was corrected to the following:     Prior to Admission Medications   Prescriptions Last Dose Informant Taking? amLODIPine (NORVASC) 10 mg tablet Unknown at Unknown time Child No   Sig: TAKE 1 TABLET BY MOUTH EVERY DAY IN THE MORNING   brimonidine (ALPHAGAN) 0.15 % ophthalmic solution Unknown at Unknown time Child No   Sig: Administer 1 Drop to both eyes two (2) times a day. clopidogrel (PLAVIX) 75 mg tab Unknown at Unknown time Child No   Sig: TAKE 1 TAB BY MOUTH DAILY. insulin aspart U-100 (NOVOLOG) 100 unit/mL (3 mL) inpn Unknown at Unknown time Child No   Si units AC breakfast,lunch, and dinner   insulin degludec (TRESIBA FLEXTOUCH U-100) 100 unit/mL (3 mL) inpn Unknown at Unknown time Child No   Si Units by SubCUTAneous route daily.    levothyroxine (SYNTHROID) 175 mcg tablet Unknown at Unknown time Child No   Sig: TAKE 1 TABLET BY MOUTH EVERY DAY   metoprolol succinate (TOPROL-XL) 25 mg XL tablet Unknown at Unknown time Child No   Sig: Take 1 Tab by mouth daily.    pravastatin (PRAVACHOL) 80 mg tablet Unknown at Unknown time Child No   Sig: TAKE 1 TABLET BY MOUTH EVERY DAY      Facility-Administered Medications: None          Thank you,  Zoe Ambrosio CPhT  Medication History Pharmacy Technician

## 2019-12-25 NOTE — ED NOTES
Bedside and Verbal shift change report given to Nestor Tavera (oncoming nurse) by Shavon Bryant (offgoing nurse). Report included the following information SBAR, ED Summary, MAR and Recent Results.

## 2019-12-25 NOTE — ED TRIAGE NOTES
Patient arrives to the emergency department via EMS with a chief complaint of high BS. Reads high on our glucometer. Pts son called because she was breathing heavily and he knows that is often because her sugar is high. Pt reports she took her insulin today.

## 2019-12-26 LAB
ANION GAP SERPL CALC-SCNC: 5 MMOL/L (ref 5–15)
ANION GAP SERPL CALC-SCNC: 6 MMOL/L (ref 5–15)
ATRIAL RATE: 112 BPM
BUN SERPL-MCNC: 22 MG/DL (ref 6–20)
BUN SERPL-MCNC: 24 MG/DL (ref 6–20)
BUN/CREAT SERPL: 21 (ref 12–20)
BUN/CREAT SERPL: 24 (ref 12–20)
CALCIUM SERPL-MCNC: 8.5 MG/DL (ref 8.5–10.1)
CALCIUM SERPL-MCNC: 8.6 MG/DL (ref 8.5–10.1)
CALCULATED P AXIS, ECG09: 51 DEGREES
CALCULATED R AXIS, ECG10: -56 DEGREES
CALCULATED T AXIS, ECG11: 37 DEGREES
CHLORIDE SERPL-SCNC: 122 MMOL/L (ref 97–108)
CHLORIDE SERPL-SCNC: 123 MMOL/L (ref 97–108)
CO2 SERPL-SCNC: 21 MMOL/L (ref 21–32)
CO2 SERPL-SCNC: 25 MMOL/L (ref 21–32)
COMMENT, HOLDF: NORMAL
CREAT SERPL-MCNC: 0.91 MG/DL (ref 0.55–1.02)
CREAT SERPL-MCNC: 1.13 MG/DL (ref 0.55–1.02)
DIAGNOSIS, 93000: NORMAL
ERYTHROCYTE [DISTWIDTH] IN BLOOD BY AUTOMATED COUNT: 14.1 % (ref 11.5–14.5)
GLUCOSE BLD STRIP.AUTO-MCNC: 109 MG/DL (ref 65–100)
GLUCOSE BLD STRIP.AUTO-MCNC: 132 MG/DL (ref 65–100)
GLUCOSE BLD STRIP.AUTO-MCNC: 274 MG/DL (ref 65–100)
GLUCOSE BLD STRIP.AUTO-MCNC: 293 MG/DL (ref 65–100)
GLUCOSE BLD STRIP.AUTO-MCNC: 383 MG/DL (ref 65–100)
GLUCOSE BLD STRIP.AUTO-MCNC: 67 MG/DL (ref 65–100)
GLUCOSE BLD STRIP.AUTO-MCNC: 82 MG/DL (ref 65–100)
GLUCOSE BLD STRIP.AUTO-MCNC: 90 MG/DL (ref 65–100)
GLUCOSE BLD STRIP.AUTO-MCNC: 90 MG/DL (ref 65–100)
GLUCOSE BLD STRIP.AUTO-MCNC: 92 MG/DL (ref 65–100)
GLUCOSE BLD STRIP.AUTO-MCNC: 92 MG/DL (ref 65–100)
GLUCOSE SERPL-MCNC: 137 MG/DL (ref 65–100)
GLUCOSE SERPL-MCNC: 83 MG/DL (ref 65–100)
HCT VFR BLD AUTO: 31.4 % (ref 35–47)
HGB BLD-MCNC: 10.5 G/DL (ref 11.5–16)
LACTATE SERPL-SCNC: 1.1 MMOL/L (ref 0.4–2)
MAGNESIUM SERPL-MCNC: 2.1 MG/DL (ref 1.6–2.4)
MAGNESIUM SERPL-MCNC: 2.2 MG/DL (ref 1.6–2.4)
MCH RBC QN AUTO: 32.4 PG (ref 26–34)
MCHC RBC AUTO-ENTMCNC: 33.4 G/DL (ref 30–36.5)
MCV RBC AUTO: 96.9 FL (ref 80–99)
NRBC # BLD: 0 K/UL (ref 0–0.01)
NRBC BLD-RTO: 0 PER 100 WBC
P-R INTERVAL, ECG05: 152 MS
PHOSPHATE SERPL-MCNC: 1.4 MG/DL (ref 2.6–4.7)
PHOSPHATE SERPL-MCNC: 2.4 MG/DL (ref 2.6–4.7)
PLATELET # BLD AUTO: 200 K/UL (ref 150–400)
PMV BLD AUTO: 10.4 FL (ref 8.9–12.9)
POTASSIUM SERPL-SCNC: 4.1 MMOL/L (ref 3.5–5.1)
POTASSIUM SERPL-SCNC: 4.4 MMOL/L (ref 3.5–5.1)
Q-T INTERVAL, ECG07: 346 MS
QRS DURATION, ECG06: 90 MS
QTC CALCULATION (BEZET), ECG08: 472 MS
RBC # BLD AUTO: 3.24 M/UL (ref 3.8–5.2)
SAMPLES BEING HELD,HOLD: NORMAL
SERVICE CMNT-IMP: ABNORMAL
SERVICE CMNT-IMP: NORMAL
SODIUM SERPL-SCNC: 150 MMOL/L (ref 136–145)
SODIUM SERPL-SCNC: 152 MMOL/L (ref 136–145)
VENTRICULAR RATE, ECG03: 112 BPM
WBC # BLD AUTO: 9.6 K/UL (ref 3.6–11)

## 2019-12-26 PROCEDURE — 74011000250 HC RX REV CODE- 250: Performed by: INTERNAL MEDICINE

## 2019-12-26 PROCEDURE — 84100 ASSAY OF PHOSPHORUS: CPT

## 2019-12-26 PROCEDURE — 74011000258 HC RX REV CODE- 258: Performed by: INTERNAL MEDICINE

## 2019-12-26 PROCEDURE — 74011250637 HC RX REV CODE- 250/637: Performed by: INTERNAL MEDICINE

## 2019-12-26 PROCEDURE — 83605 ASSAY OF LACTIC ACID: CPT

## 2019-12-26 PROCEDURE — 85027 COMPLETE CBC AUTOMATED: CPT

## 2019-12-26 PROCEDURE — 36415 COLL VENOUS BLD VENIPUNCTURE: CPT

## 2019-12-26 PROCEDURE — 83735 ASSAY OF MAGNESIUM: CPT

## 2019-12-26 PROCEDURE — 65660000000 HC RM CCU STEPDOWN

## 2019-12-26 PROCEDURE — 82962 GLUCOSE BLOOD TEST: CPT

## 2019-12-26 PROCEDURE — 74011636637 HC RX REV CODE- 636/637: Performed by: INTERNAL MEDICINE

## 2019-12-26 PROCEDURE — 74011250636 HC RX REV CODE- 250/636: Performed by: INTERNAL MEDICINE

## 2019-12-26 PROCEDURE — 80048 BASIC METABOLIC PNL TOTAL CA: CPT

## 2019-12-26 RX ORDER — INSULIN LISPRO 100 [IU]/ML
INJECTION, SOLUTION INTRAVENOUS; SUBCUTANEOUS
Status: DISCONTINUED | OUTPATIENT
Start: 2019-12-27 | End: 2019-12-27

## 2019-12-26 RX ORDER — MAGNESIUM SULFATE 100 %
4 CRYSTALS MISCELLANEOUS AS NEEDED
Status: DISCONTINUED | OUTPATIENT
Start: 2019-12-26 | End: 2019-12-26 | Stop reason: SDUPTHER

## 2019-12-26 RX ORDER — MAGNESIUM SULFATE 100 %
4 CRYSTALS MISCELLANEOUS AS NEEDED
Status: DISCONTINUED | OUTPATIENT
Start: 2019-12-26 | End: 2019-12-30 | Stop reason: HOSPADM

## 2019-12-26 RX ORDER — POTASSIUM CHLORIDE AND SODIUM CHLORIDE 450; 150 MG/100ML; MG/100ML
INJECTION, SOLUTION INTRAVENOUS CONTINUOUS
Status: DISCONTINUED | OUTPATIENT
Start: 2019-12-26 | End: 2019-12-29

## 2019-12-26 RX ORDER — INSULIN GLARGINE 100 [IU]/ML
16 INJECTION, SOLUTION SUBCUTANEOUS
Status: DISCONTINUED | OUTPATIENT
Start: 2019-12-26 | End: 2019-12-27

## 2019-12-26 RX ORDER — DEXTROSE MONOHYDRATE 100 MG/ML
0-250 INJECTION, SOLUTION INTRAVENOUS AS NEEDED
Status: DISCONTINUED | OUTPATIENT
Start: 2019-12-26 | End: 2019-12-30 | Stop reason: HOSPADM

## 2019-12-26 RX ORDER — INSULIN LISPRO 100 [IU]/ML
INJECTION, SOLUTION INTRAVENOUS; SUBCUTANEOUS
Status: DISCONTINUED | OUTPATIENT
Start: 2019-12-26 | End: 2019-12-27 | Stop reason: SDUPTHER

## 2019-12-26 RX ADMIN — INSULIN LISPRO 6 UNITS: 100 INJECTION, SOLUTION INTRAVENOUS; SUBCUTANEOUS at 17:03

## 2019-12-26 RX ADMIN — HEPARIN SODIUM 5000 UNITS: 5000 INJECTION INTRAVENOUS; SUBCUTANEOUS at 22:34

## 2019-12-26 RX ADMIN — PRAVASTATIN SODIUM 80 MG: 40 TABLET ORAL at 08:59

## 2019-12-26 RX ADMIN — SODIUM CHLORIDE AND POTASSIUM CHLORIDE: 4.5; 1.49 INJECTION, SOLUTION INTRAVENOUS at 16:25

## 2019-12-26 RX ADMIN — AMLODIPINE BESYLATE 5 MG: 5 TABLET ORAL at 08:59

## 2019-12-26 RX ADMIN — HEPARIN SODIUM 5000 UNITS: 5000 INJECTION INTRAVENOUS; SUBCUTANEOUS at 06:30

## 2019-12-26 RX ADMIN — SODIUM BICARBONATE: 84 INJECTION, SOLUTION INTRAVENOUS at 08:58

## 2019-12-26 RX ADMIN — INSULIN GLARGINE 16 UNITS: 100 INJECTION, SOLUTION SUBCUTANEOUS at 22:34

## 2019-12-26 RX ADMIN — Medication 10 ML: at 22:35

## 2019-12-26 RX ADMIN — SODIUM CHLORIDE 3.5 UNITS/HR: 9 INJECTION, SOLUTION INTRAVENOUS at 00:47

## 2019-12-26 RX ADMIN — METOPROLOL SUCCINATE 25 MG: 25 TABLET, EXTENDED RELEASE ORAL at 08:59

## 2019-12-26 RX ADMIN — DOCUSATE SODIUM 100 MG: 100 CAPSULE, LIQUID FILLED ORAL at 08:59

## 2019-12-26 RX ADMIN — HEPARIN SODIUM 5000 UNITS: 5000 INJECTION INTRAVENOUS; SUBCUTANEOUS at 12:32

## 2019-12-26 RX ADMIN — SODIUM CHLORIDE 0.4 UNITS/HR: 9 INJECTION, SOLUTION INTRAVENOUS at 04:01

## 2019-12-26 RX ADMIN — CLOPIDOGREL BISULFATE 75 MG: 75 TABLET, FILM COATED ORAL at 08:59

## 2019-12-26 RX ADMIN — INSULIN LISPRO 3 UNITS: 100 INJECTION, SOLUTION INTRAVENOUS; SUBCUTANEOUS at 12:31

## 2019-12-26 RX ADMIN — Medication 10 ML: at 14:00

## 2019-12-26 RX ADMIN — DOCUSATE SODIUM 100 MG: 100 CAPSULE, LIQUID FILLED ORAL at 17:15

## 2019-12-26 NOTE — PROGRESS NOTES
Hospital Progress Note    NAME:  Devorah Murguia   :   1940   MRN:  [id number]     Date/Time:  2019 4:02 PM    Plan:   1. Titrate insulin  2. Encourage activity  Risk of Deterioration: Low  []           Moderate  [x]           High  []                 Assessment:   Principal Problem:    DKA (diabetic ketoacidoses) ([facility] (2017)     Resolved    Active Problems:    Vascular dementia without behavioral disturbance ([facility] (10/20/2018)      Hypertension (2015)     Titrate home meds      Memory deficit (2015)     Discussed with daughter      Lactic acidosis (6/10/2019)     resolved      Acute encephalopathy (6/10/2019)     resolvved      ONEYDA (acute kidney injury) ([facility] (2019)          [de-identified] notes:69years old -American female with past medical history uncontrolled diabetes mellitus type 2 on insulin, hypertension, hypothyroidism, CVA presented to the emergency department by EMS because she was having high readings on her blood sugar at home. Patient reported also that she was having abdominal pain, nausea and vomiting in the last few days and she has not been using her medications as well for that reason. Patient had multiple admissions to the hospital last one early 2019 with the same presentation and she was always in DKA.   Patient denied any shortness of breath, chest pain, dizziness, palpitations, runny nose, sore throat.     In the emergency department, patient was found to be in DKA with severe high anion gap metabolic acidosis for which she was started on DKA protocol and given IV fluids.     We were asked to admit for work up and evaluation of the above problems.        Subjective:     Feeling better - offers various excusses for her non compliance  11 Point Review of Systems:   Negative except no n/v    []            Unable to obtain ROS due to:       []            mental status change []            sedated []            intubated     Social History     Tobacco Use    Smoking status: Never Smoker    Smokeless tobacco: Never Used   Substance Use Topics    Alcohol use: No     Comment: been years     Medications reviewed:  Current Facility-Administered Medications   Medication Dose Route Frequency    insulin lispro (HUMALOG) injection   SubCUTAneous TIDAC    glucose chewable tablet 16 g  4 Tab Oral PRN    glucagon (GLUCAGEN) injection 1 mg  1 mg IntraMUSCular PRN    dextrose 10% infusion 0-250 mL  0-250 mL IntraVENous PRN    insulin glargine (LANTUS) injection 16 Units  16 Units SubCUTAneous QHS    clopidogrel (PLAVIX) tablet 75 mg  75 mg Oral DAILY    pravastatin (PRAVACHOL) tablet 80 mg  80 mg Oral DAILY    metoprolol succinate (TOPROL-XL) XL tablet 25 mg  25 mg Oral DAILY    amLODIPine (NORVASC) tablet 5 mg  5 mg Oral DAILY    sodium chloride (NS) flush 5-40 mL  5-40 mL IntraVENous Q8H    sodium chloride (NS) flush 5-40 mL  5-40 mL IntraVENous PRN    acetaminophen (TYLENOL) solution 650 mg  650 mg Oral Q4H PRN    docusate sodium (COLACE) capsule 100 mg  100 mg Oral BID    heparin (porcine) injection 5,000 Units  5,000 Units SubCUTAneous Q8H        Objective:   Vitals:  Visit Vitals  /67   Pulse 91   Temp 98.3 °F (36.8 °C)   Resp 18   Ht 5' 2\" (1.575 m)   Wt 145 lb 8.1 oz (66 kg)   SpO2 100%   BMI 26.61 kg/m²     Temp (24hrs), Av.2 °F (36.8 °C), Min:97.5 °F (36.4 °C), Max:99.1 °F (37.3 °C)      O2 Device: Room air    Last 24hr Input/Output:    Intake/Output Summary (Last 24 hours) at 2019 1602  Last data filed at 2019 1518  Gross per 24 hour   Intake 3120 ml   Output 2000 ml   Net 1120 ml        PHYSICAL EXAM:  General:    Alert, cooperative, no distress, appears stated age. Head:   Normocephalic, without obvious abnormality, atraumatic. Eyes:   Conjunctivae/corneas clear. PERRLA  Nose:  Nares normal. No drainage or sinus tenderness.   Throat:    Lips, mucosa, and tongue normal.  No Thrush  Neck:  Supple, symmetrical,  no adenopathy, thyroid: non tender    no carotid bruit and no JVD. Back:    Symmetric,  No CVA tenderness. Lungs:   Clear to auscultation bilaterally. No Wheezing or Rhonchi. No rales. Chest wall:  No tenderness or deformity. No Accessory muscle use. Heart:   Regular rate and rhythm,  no murmur, rub or gallop. Abdomen:   Soft, non-tender. Not distended. Bowel sounds normal. No masses         Lab Data Reviewed:    Recent Labs     12/26/19  0404 12/25/19  1346   WBC 9.6 10.4   HGB 10.5* 11.8   HCT 31.4* 37.9    257     Recent Labs     12/26/19  0404 12/26/19  0010 12/25/19  1958  12/25/19  1346   * 150* 146*   < > 135*   K 4.1 4.4 4.1   < > 6.9*   * 123* 117*   < > 102   CO2 25 21 9*   < > 5*   GLU 83 137* 457*   < > 870*   BUN 22* 24* 30*   < > 31*   CREA 0.91 1.13* 1.64*   < > 1.70*   CA 8.5 8.6 8.6   < > 9.7   MG 2.1 2.2 2.6*   < > 2.6*   PHOS 2.4* 1.4* 2.7   < > 7.7*   ALB  --   --   --   --  3.7   TBILI  --   --   --   --  0.5   SGOT  --   --   --   --  21   ALT  --   --   --   --  30    < > = values in this interval not displayed. Lab Results   Component Value Date/Time    Glucose (POC) 293 (H) 12/26/2019 11:20 AM    Glucose (POC) 92 12/26/2019 06:21 AM    Glucose (POC) 90 12/26/2019 05:51 AM    Glucose (POC) 67 12/26/2019 05:26 AM    Glucose (POC) 82 12/26/2019 03:58 AM     No results for input(s): PH, PCO2, PO2, HCO3, FIO2 in the last 72 hours. No results for input(s): INR, INREXT in the last 72 hours.   ___________________________________________________  ___________________________________________________    Attending Physician: Whitney Wells, MD

## 2019-12-26 NOTE — PROGRESS NOTES
Attempted to see patient and patient was receiving care.  CM to F/U in AM.     [facility], [address]

## 2019-12-26 NOTE — ED NOTES
TRANSFER - OUT REPORT:    Verbal report given to Keith Anand RN(name) on [address]  being transferred to [facility](unit) for routine progression of care       Report consisted of patients Situation, Background, Assessment and   Recommendations(SBAR). Information from the following report(s) ED Summary was reviewed with the receiving nurse. Opportunity for questions and clarification was provided.       Patient transported with:   RN, Tech, Monitor

## 2019-12-26 NOTE — ED NOTES
Pt noted to have stool on her rt hand. Pt found to have a large amount of soft stool. [place] in color. Pt cleansed and linen replaced. Pt tolerated well.

## 2019-12-26 NOTE — PROGRESS NOTES
Problem: Falls - Risk of  Goal: *Absence of Falls  Description  Document Richard Cross Fall Risk and appropriate interventions in the flowsheet.   Outcome: Progressing Towards Goal  Note: Fall Risk Interventions:  Mobility Interventions: Patient to call before getting OOB, PT Consult for mobility concerns         Medication Interventions: Teach patient to arise slowly, Utilize gait belt for transfers/ambulation

## 2019-12-26 NOTE — PROGRESS NOTES
TRANSFER - IN REPORT:    Verbal report received from Buck Flower  on Nimco Doshi  being received from ED for routine progression of care      Report consisted of patients Situation, Background, Assessment and   Recommendations(SBAR). Information from the following report(s) SBAR, Kardex, Intake/Output, MAR, Recent Results and Cardiac Rhythm NSR was reviewed with the receiving nurse. Opportunity for questions and clarification was provided. 2 Person skin check completed with Taylor Mathew. Assessment completed upon patients arrival to unit and care assumed.

## 2019-12-26 NOTE — PROGRESS NOTES
Bedside shift change report given to Riki Caruso (oncoming nurse) by Lenny Cosme (offgoing nurse). Report included the following information SBAR, Kardex, Intake/Output, MAR, Recent Results and Cardiac Rhythm NSR.

## 2019-12-26 NOTE — PROGRESS NOTES
0700: Bedside shift change report given to Shola Gonzalez (oncoming nurse) by Jonn Arvizu (offgoing nurse). Report included the following information SBAR, Kardex, Intake/Output and MAR.     0700: Patient in bed, resting quietly. Call bell in reach, will monitor. 0720: When patient arrived on floor, her insulin drip was off. Her anion gap was closed X2 and drip stopped. PCP called and stated he could not get by to see patient today, so he want the hospitalist to see patient. We both sent the hospitalist a message. 0900: PCP called back and stated he could not get in touch with the hospitalist. So he will be by to see patient this afternoon. Confirmed with MD that it was okay for the drip to be off. He also d/c Q4 BNPs and made patient ACHS. PCP will also add sliding scale insulin     1602: Dr. Cata Arevalo at bedside assessing patient. 1633: Patient up to chair. Tolerated activity well. In chair, resting quietly. Call bell in reach, will monitor. 1900: Bedside shift change report given to Latanya (oncoming nurse) by Shola Gonzalez (offgoing nurse). Report included the following information SBAR, Kardex, Intake/Output and MAR.

## 2019-12-27 LAB
ANION GAP SERPL CALC-SCNC: 6 MMOL/L (ref 5–15)
BUN SERPL-MCNC: 17 MG/DL (ref 6–20)
BUN/CREAT SERPL: 18 (ref 12–20)
CALCIUM SERPL-MCNC: 8.1 MG/DL (ref 8.5–10.1)
CHLORIDE SERPL-SCNC: 115 MMOL/L (ref 97–108)
CO2 SERPL-SCNC: 27 MMOL/L (ref 21–32)
CREAT SERPL-MCNC: 0.93 MG/DL (ref 0.55–1.02)
ERYTHROCYTE [DISTWIDTH] IN BLOOD BY AUTOMATED COUNT: 15 % (ref 11.5–14.5)
EST. AVERAGE GLUCOSE BLD GHB EST-MCNC: 280 MG/DL
GLUCOSE BLD STRIP.AUTO-MCNC: 108 MG/DL (ref 65–100)
GLUCOSE BLD STRIP.AUTO-MCNC: 135 MG/DL (ref 65–100)
GLUCOSE BLD STRIP.AUTO-MCNC: 238 MG/DL (ref 65–100)
GLUCOSE BLD STRIP.AUTO-MCNC: 280 MG/DL (ref 65–100)
GLUCOSE BLD STRIP.AUTO-MCNC: NORMAL MG/DL (ref 65–100)
GLUCOSE SERPL-MCNC: 150 MG/DL (ref 65–100)
HBA1C MFR BLD: 11.4 % (ref 4–5.6)
HCT VFR BLD AUTO: 33.1 % (ref 35–47)
HGB BLD-MCNC: 11.2 G/DL (ref 11.5–16)
MCH RBC QN AUTO: 32.7 PG (ref 26–34)
MCHC RBC AUTO-ENTMCNC: 33.8 G/DL (ref 30–36.5)
MCV RBC AUTO: 96.5 FL (ref 80–99)
NRBC # BLD: 0.02 K/UL (ref 0–0.01)
NRBC BLD-RTO: 0.2 PER 100 WBC
PLATELET # BLD AUTO: 189 K/UL (ref 150–400)
PMV BLD AUTO: 10 FL (ref 8.9–12.9)
POTASSIUM SERPL-SCNC: 3.5 MMOL/L (ref 3.5–5.1)
RBC # BLD AUTO: 3.43 M/UL (ref 3.8–5.2)
SERVICE CMNT-IMP: ABNORMAL
SERVICE CMNT-IMP: NORMAL
SODIUM SERPL-SCNC: 148 MMOL/L (ref 136–145)
WBC # BLD AUTO: 9.4 K/UL (ref 3.6–11)

## 2019-12-27 PROCEDURE — 74011636637 HC RX REV CODE- 636/637: Performed by: INTERNAL MEDICINE

## 2019-12-27 PROCEDURE — 65660000000 HC RM CCU STEPDOWN

## 2019-12-27 PROCEDURE — 85027 COMPLETE CBC AUTOMATED: CPT

## 2019-12-27 PROCEDURE — 82962 GLUCOSE BLOOD TEST: CPT

## 2019-12-27 PROCEDURE — 74011250637 HC RX REV CODE- 250/637: Performed by: INTERNAL MEDICINE

## 2019-12-27 PROCEDURE — 36415 COLL VENOUS BLD VENIPUNCTURE: CPT

## 2019-12-27 PROCEDURE — 74011250636 HC RX REV CODE- 250/636: Performed by: INTERNAL MEDICINE

## 2019-12-27 PROCEDURE — 77030040361 HC SLV COMPR DVT MDII -B

## 2019-12-27 PROCEDURE — 77030027138 HC INCENT SPIROMETER -A

## 2019-12-27 PROCEDURE — 80048 BASIC METABOLIC PNL TOTAL CA: CPT

## 2019-12-27 RX ORDER — INSULIN LISPRO 100 [IU]/ML
4 INJECTION, SOLUTION INTRAVENOUS; SUBCUTANEOUS
Status: DISCONTINUED | OUTPATIENT
Start: 2019-12-27 | End: 2019-12-30 | Stop reason: HOSPADM

## 2019-12-27 RX ORDER — INSULIN GLARGINE 100 [IU]/ML
16 INJECTION, SOLUTION SUBCUTANEOUS DAILY
Status: DISCONTINUED | OUTPATIENT
Start: 2019-12-28 | End: 2019-12-30 | Stop reason: HOSPADM

## 2019-12-27 RX ADMIN — AMLODIPINE BESYLATE 5 MG: 5 TABLET ORAL at 09:22

## 2019-12-27 RX ADMIN — HEPARIN SODIUM 5000 UNITS: 5000 INJECTION INTRAVENOUS; SUBCUTANEOUS at 12:35

## 2019-12-27 RX ADMIN — DOCUSATE SODIUM 100 MG: 100 CAPSULE, LIQUID FILLED ORAL at 19:49

## 2019-12-27 RX ADMIN — PRAVASTATIN SODIUM 80 MG: 40 TABLET ORAL at 09:22

## 2019-12-27 RX ADMIN — DOCUSATE SODIUM 100 MG: 100 CAPSULE, LIQUID FILLED ORAL at 09:22

## 2019-12-27 RX ADMIN — METOPROLOL SUCCINATE 25 MG: 25 TABLET, EXTENDED RELEASE ORAL at 09:22

## 2019-12-27 RX ADMIN — HEPARIN SODIUM 5000 UNITS: 5000 INJECTION INTRAVENOUS; SUBCUTANEOUS at 05:41

## 2019-12-27 RX ADMIN — INSULIN LISPRO 4 UNITS: 100 INJECTION, SOLUTION INTRAVENOUS; SUBCUTANEOUS at 12:35

## 2019-12-27 RX ADMIN — INSULIN LISPRO 4 UNITS: 100 INJECTION, SOLUTION INTRAVENOUS; SUBCUTANEOUS at 17:16

## 2019-12-27 RX ADMIN — INSULIN LISPRO 2 UNITS: 100 INJECTION, SOLUTION INTRAVENOUS; SUBCUTANEOUS at 00:37

## 2019-12-27 RX ADMIN — CLOPIDOGREL BISULFATE 75 MG: 75 TABLET, FILM COATED ORAL at 09:22

## 2019-12-27 RX ADMIN — Medication 10 ML: at 12:36

## 2019-12-27 RX ADMIN — SODIUM CHLORIDE AND POTASSIUM CHLORIDE: 4.5; 1.49 INJECTION, SOLUTION INTRAVENOUS at 09:20

## 2019-12-27 RX ADMIN — INSULIN LISPRO 4 UNITS: 100 INJECTION, SOLUTION INTRAVENOUS; SUBCUTANEOUS at 08:33

## 2019-12-27 RX ADMIN — Medication 10 ML: at 05:41

## 2019-12-27 NOTE — PROGRESS NOTES
Spiritual Care Partner Volunteer visited patient in PCU on December 27, 2019.   Documented by:     SAEED Baeza, [facility], Staff [address]    [address] Paging Service  [phone]

## 2019-12-27 NOTE — PROGRESS NOTES
Bedside shift change report given to Emmanuel fuentes RN (oncoming nurse) by Michael Figueroa RN (offgoing nurse). Report included the following information SBAR, Kardex, Intake/Output, MAR and Cardiac Rhythm NSR. Pt experiencing some confusion, however is very pleasant and easily redirected. 2100:HSBS elevated, 274. No correctional orders, sent message to tele hospitalist \   -Orders placed without sliding scale , waiting new orders    Pt rested peacefully throughout the night, no needs expressed at this time. 0700: Bedside shift change report given to Lisseth Yadav RN (oncoming nurse) by Emmanuel fuentes RN (offgoing nurse). Report included the following information Kardex.

## 2019-12-27 NOTE — DIABETES MGMT
[facility]    [facility] Progress Note    Recommendations/ Comments: If appropriate, please consider adding Lispro Correctional insulin with high sensitivity. Please also consider changing Lantus to bedtime tonight so patient will not be without basal insulin. Chart reviewed on Deana Chase. Patient is a 69 y.o. female with known diabetes on Tresiba 16 units daily and Novolog 4 units ac meals at home. A1c:   Lab Results   Component Value Date/Time    Hemoglobin A1c 11.4 (H) 12/25/2019 05:17 PM    Hemoglobin A1c >14.0 (H) 12/01/2019 12:03 PM       Recent Glucose Results:   Lab Results   Component Value Date/Time     (H) 12/27/2019 12:41 AM    GLUCPOC 135 (H) 12/27/2019 07:37 AM    GLUCPOC 280 (H) 12/27/2019 12:14 AM    GLUCPOC 274 (H) 12/26/2019 08:39 PM        Lab Results   Component Value Date/Time    Creatinine 0.93 12/27/2019 12:41 AM     Estimated Creatinine Clearance: 45.2 mL/min (based on SCr of 0.93 mg/dL). Active Orders   Diet    DIET DIABETIC CONSISTENT CARB Regular        PO intake:   Patient Vitals for the past 72 hrs:   % Diet Eaten   12/27/19 0925 96 %   12/26/19 1733 100 %   12/26/19 1200 75 %   12/26/19 0900 0 %       Current hospital DM medication: Lispro 4 units ac meals, Lantus 16 units daily    Will continue to follow as needed. Thank you.     Kimberly Laughlin, [address], [address]  Office:  [phone]

## 2019-12-27 NOTE — PROGRESS NOTES
Reason for Readmission: DKI             RRAT Score and Risk Level:     16 - Moderate     Level of Readmission:    Level 1      Care Conference scheduled:  IDR will discuss      Did you attend your follow up appointment (s): If not, why not:  [facility] was helping pt after discharge from hospital.       Resources/supports as identified by patient/family:   Pt has a good family support. Top Challenges facing patient (as identified by patient/family and CM): Finances/Medication cost?   Not an issue- Has insurance. Transportation      Pt was independently driving however children taking turn to assist pt with her transportation. Support system or lack thereof? Has good family support. Living arrangements? With Son's         Self-care/ADLs/Cognition? Independent. Current Advanced Directive/Advance Care Plan:  Full code. Plan for utilizing home health: Yes             Transition of Care Plan:    Based on readmission, the patient's previous Plan of [address]   has been evaluated and/or modified. The current Transition of Care Plan is:  Home with [facility] ,PCP appointment is scheduled for 12/31/ 2019. Care Management Interventions  PCP Verified by CM: Yes(Next appointment is schedueld on 12/31/2019)  Mode of Transport at Discharge: Self(Family (children) will transport pt back to home.)  Transition of Care Consult (CM Consult): Home Health(Pt had [facility] via [facility] for SN services.  May need [facility] services upon.)  [address]: Yes  Discharge Durable Medical Equipment: No  Current Support Network: Lives Alone  Confirm Follow Up Transport: Family  Discharge Location  Discharge Placement: Home with home health    Juli Dannie MSW  ED Case Manager   X[facility] [phone]

## 2019-12-27 NOTE — PROGRESS NOTES
Problem: Diabetes Self-Management  Goal: *Incorporating nutritional management into lifestyle  Description  Describe effect of type, amount and timing of food on blood glucose; list 3 methods for planning meals. 12/27/2019 0104 by Severo Holm  Outcome: Progressing Towards Goal  12/27/2019 0104 by Severo Holm  Outcome: Progressing Towards Goal  Goal: *Incorporating physical activity into lifestyle  Description  State effect of exercise on blood glucose levels. 12/27/2019 0104 by Severo Holm  Outcome: Progressing Towards Goal  12/27/2019 0104 by Severo Holm  Outcome: Progressing Towards Goal  Goal: *Developing strategies to promote health/change behavior  Description  Define the ABC's of diabetes; identify appropriate screenings, schedule and personal plan for screenings. 12/27/2019 0104 by Severo Holm  Outcome: Progressing Towards Goal  12/27/2019 0104 by Severo Holm  Outcome: Progressing Towards Goal  Goal: *Using medications safely  Description  State effect of diabetes medications on diabetes; name diabetes medication taking, action and side effects. 12/27/2019 0104 by Severo Holm  Outcome: Progressing Towards Goal  12/27/2019 0104 by Severo Holm  Outcome: Progressing Towards Goal  Goal: *Monitoring blood glucose, interpreting and using results  Description  Identify recommended blood glucose targets  and personal targets. 12/27/2019 0104 by Severo Holm  Outcome: Progressing Towards Goal  12/27/2019 0104 by Severo Holm  Outcome: Progressing Towards Goal  Goal: *Prevention, detection, treatment of acute complications  Description  List symptoms of hyper- and hypoglycemia; describe how to treat low blood sugar and actions for lowering  high blood glucose level.   12/27/2019 0104 by Severo Holm  Outcome: Progressing Towards Goal  12/27/2019 0104 by Severo Holm  Outcome: Progressing Towards Goal  Goal: *Prevention, detection and treatment of chronic complications  Description  Define the natural course of diabetes and describe the relationship of blood glucose levels to long term complications of diabetes. 12/27/2019 0104 by [employer]  Outcome: Progressing Towards Goal  12/27/2019 0104 by LawadnaSMS Assistbasia Riley  Outcome: Progressing Towards Goal  Goal: *Developing strategies to address psychosocial issues  Description  Describe feelings about living with diabetes; identify support needed and support network  Outcome: Progressing Towards Goal     Problem: Patient Education: Go to Patient Education Activity  Goal: Patient/Family Education  12/27/2019 0104 by LawandaSMS Assistbasia Riley  Outcome: Progressing Towards Goal  12/27/2019 0104 by LawandaSMS Assistbasia Riley  Outcome: Not Progressing Towards Goal     Problem: Pressure Injury - Risk of  Goal: *Prevention of pressure injury  Description  Document Madhu Scale and appropriate interventions in the flowsheet.   Outcome: Progressing Towards Goal  Note: Pressure Injury Interventions:  Sensory Interventions: Assess changes in LOC, Assess need for specialty bed, Keep linens dry and wrinkle-free, Discuss PT/OT consult with provider    Moisture Interventions: Absorbent underpads, Check for incontinence Q2 hours and as needed    Activity Interventions: Pressure redistribution bed/mattress(bed type), PT/OT evaluation, Increase time out of bed    Mobility Interventions: Assess need for specialty bed, PT/OT evaluation, Float heels    Nutrition Interventions: Document food/fluid/supplement intake    Friction and Shear Interventions: Lift sheet, Lift team/patient mobility team                Problem: Patient Education: Go to Patient Education Activity  Goal: Patient/Family Education  Outcome: Progressing Towards Goal

## 2019-12-28 LAB
ANION GAP SERPL CALC-SCNC: 6 MMOL/L (ref 5–15)
BUN SERPL-MCNC: 14 MG/DL (ref 6–20)
BUN/CREAT SERPL: 19 (ref 12–20)
CALCIUM SERPL-MCNC: 7.6 MG/DL (ref 8.5–10.1)
CHLORIDE SERPL-SCNC: 109 MMOL/L (ref 97–108)
CO2 SERPL-SCNC: 26 MMOL/L (ref 21–32)
CREAT SERPL-MCNC: 0.74 MG/DL (ref 0.55–1.02)
ERYTHROCYTE [DISTWIDTH] IN BLOOD BY AUTOMATED COUNT: 14.6 % (ref 11.5–14.5)
GLUCOSE BLD STRIP.AUTO-MCNC: 185 MG/DL (ref 65–100)
GLUCOSE BLD STRIP.AUTO-MCNC: 298 MG/DL (ref 65–100)
GLUCOSE BLD STRIP.AUTO-MCNC: 365 MG/DL (ref 65–100)
GLUCOSE BLD STRIP.AUTO-MCNC: 399 MG/DL (ref 65–100)
GLUCOSE SERPL-MCNC: 346 MG/DL (ref 65–100)
HCT VFR BLD AUTO: 33.6 % (ref 35–47)
HGB BLD-MCNC: 11.2 G/DL (ref 11.5–16)
MCH RBC QN AUTO: 32.4 PG (ref 26–34)
MCHC RBC AUTO-ENTMCNC: 33.3 G/DL (ref 30–36.5)
MCV RBC AUTO: 97.1 FL (ref 80–99)
NRBC # BLD: 0 K/UL (ref 0–0.01)
NRBC BLD-RTO: 0 PER 100 WBC
PLATELET # BLD AUTO: 160 K/UL (ref 150–400)
PMV BLD AUTO: 10.1 FL (ref 8.9–12.9)
POTASSIUM SERPL-SCNC: 3.9 MMOL/L (ref 3.5–5.1)
RBC # BLD AUTO: 3.46 M/UL (ref 3.8–5.2)
SERVICE CMNT-IMP: ABNORMAL
SODIUM SERPL-SCNC: 141 MMOL/L (ref 136–145)
WBC # BLD AUTO: 3.9 K/UL (ref 3.6–11)

## 2019-12-28 PROCEDURE — 80048 BASIC METABOLIC PNL TOTAL CA: CPT

## 2019-12-28 PROCEDURE — 74011250637 HC RX REV CODE- 250/637: Performed by: INTERNAL MEDICINE

## 2019-12-28 PROCEDURE — 85027 COMPLETE CBC AUTOMATED: CPT

## 2019-12-28 PROCEDURE — 74011250636 HC RX REV CODE- 250/636: Performed by: INTERNAL MEDICINE

## 2019-12-28 PROCEDURE — 82962 GLUCOSE BLOOD TEST: CPT

## 2019-12-28 PROCEDURE — 36415 COLL VENOUS BLD VENIPUNCTURE: CPT

## 2019-12-28 PROCEDURE — 65660000000 HC RM CCU STEPDOWN

## 2019-12-28 PROCEDURE — 74011636637 HC RX REV CODE- 636/637: Performed by: INTERNAL MEDICINE

## 2019-12-28 RX ADMIN — Medication 10 ML: at 05:42

## 2019-12-28 RX ADMIN — SODIUM CHLORIDE AND POTASSIUM CHLORIDE: 4.5; 1.49 INJECTION, SOLUTION INTRAVENOUS at 00:40

## 2019-12-28 RX ADMIN — DOCUSATE SODIUM 100 MG: 100 CAPSULE, LIQUID FILLED ORAL at 09:45

## 2019-12-28 RX ADMIN — HEPARIN SODIUM 5000 UNITS: 5000 INJECTION INTRAVENOUS; SUBCUTANEOUS at 13:14

## 2019-12-28 RX ADMIN — DOCUSATE SODIUM 100 MG: 100 CAPSULE, LIQUID FILLED ORAL at 17:31

## 2019-12-28 RX ADMIN — HEPARIN SODIUM 5000 UNITS: 5000 INJECTION INTRAVENOUS; SUBCUTANEOUS at 20:34

## 2019-12-28 RX ADMIN — INSULIN LISPRO 4 UNITS: 100 INJECTION, SOLUTION INTRAVENOUS; SUBCUTANEOUS at 17:31

## 2019-12-28 RX ADMIN — SODIUM CHLORIDE AND POTASSIUM CHLORIDE: 4.5; 1.49 INJECTION, SOLUTION INTRAVENOUS at 18:07

## 2019-12-28 RX ADMIN — INSULIN LISPRO 4 UNITS: 100 INJECTION, SOLUTION INTRAVENOUS; SUBCUTANEOUS at 09:45

## 2019-12-28 RX ADMIN — INSULIN LISPRO 4 UNITS: 100 INJECTION, SOLUTION INTRAVENOUS; SUBCUTANEOUS at 13:14

## 2019-12-28 RX ADMIN — METOPROLOL SUCCINATE 25 MG: 25 TABLET, EXTENDED RELEASE ORAL at 09:45

## 2019-12-28 RX ADMIN — CLOPIDOGREL BISULFATE 75 MG: 75 TABLET, FILM COATED ORAL at 09:45

## 2019-12-28 RX ADMIN — AMLODIPINE BESYLATE 5 MG: 5 TABLET ORAL at 09:45

## 2019-12-28 RX ADMIN — INSULIN GLARGINE 16 UNITS: 100 INJECTION, SOLUTION SUBCUTANEOUS at 09:45

## 2019-12-28 RX ADMIN — PRAVASTATIN SODIUM 80 MG: 40 TABLET ORAL at 09:45

## 2019-12-28 NOTE — PROGRESS NOTES
General Daily Progress Note    Admit Date: 12/25/2019    Subjective:     Patient has no complaint. Current Facility-Administered Medications   Medication Dose Route Frequency    insulin lispro (HUMALOG) injection 4 Units  4 Units SubCUTAneous TIDAC    insulin glargine (LANTUS) injection 16 Units  16 Units SubCUTAneous DAILY    dextrose 10% infusion 0-250 mL  0-250 mL IntraVENous PRN    0.45% sodium chloride with KCl 20 mEq/L infusion   IntraVENous CONTINUOUS    glucose chewable tablet 16 g  4 Tab Oral PRN    glucagon (GLUCAGEN) injection 1 mg  1 mg IntraMUSCular PRN    clopidogrel (PLAVIX) tablet 75 mg  75 mg Oral DAILY    pravastatin (PRAVACHOL) tablet 80 mg  80 mg Oral DAILY    metoprolol succinate (TOPROL-XL) XL tablet 25 mg  25 mg Oral DAILY    amLODIPine (NORVASC) tablet 5 mg  5 mg Oral DAILY    sodium chloride (NS) flush 5-40 mL  5-40 mL IntraVENous Q8H    sodium chloride (NS) flush 5-40 mL  5-40 mL IntraVENous PRN    acetaminophen (TYLENOL) solution 650 mg  650 mg Oral Q4H PRN    docusate sodium (COLACE) capsule 100 mg  100 mg Oral BID    heparin (porcine) injection 5,000 Units  5,000 Units SubCUTAneous Q8H        Review of Systems  A comprehensive review of systems was negative. Objective:     Patient Vitals for the past 24 hrs:   BP Temp Pulse Resp SpO2   12/27/19 2231 143/63 98.1 °F (36.7 °C) 73 16 98 %   12/27/19 1957 126/56 97.7 °F (36.5 °C) 77 16 100 %   12/27/19 1700 145/63 98 °F (36.7 °C) 80 16    12/27/19 1039 161/78 99.2 °F (37.3 °C) 90 16    12/27/19 0728 145/74 97.5 °F (36.4 °C) 83 16 100 %   12/27/19 0301 148/55 98.6 °F (37 °C) 80 16 100 %     12/27 1901 - 12/28 0700  In: 180 [P.O.:180]  Out: 400 [Urine:400]  12/26 0701 - 12/27 1900  In: 3337 [P.O.:420;  I.V.:636]  Out: 850 [Urine:850]    Physical Exam:   Visit Vitals  /63 (BP 1 Location: Right arm, BP Patient Position: At rest)   Pulse 73   Temp 98.1 °F (36.7 °C)   Resp 16   Ht 5' 2\" (1.575 m)   Wt 145 lb 8.1 oz (66 kg)   SpO2 98%   BMI 26.61 kg/m²     General appearance: alert, cooperative, no distress, appears stated age  Neck: supple, symmetrical, trachea midline, no adenopathy, thyroid: not enlarged, symmetric, no tenderness/mass/nodules, no carotid bruit and no JVD  Lungs: clear to auscultation bilaterally  Heart: regular rate and rhythm, S1, S2 normal, no murmur, click, rub or gallop  Abdomen: soft, non-tender. Bowel sounds normal. No masses,  no organomegaly  Extremities: extremities normal, atraumatic, no cyanosis or edema        Data Review   Recent Results (from the past 24 hour(s))   GLUCOSE, POC    Collection Time: 12/27/19  7:37 AM   Result Value Ref Range    Glucose (POC) 135 (H) 65 - 100 mg/dL    Performed by Crittenden Knovelg    GLUCOSE, POC    Collection Time: 12/27/19 11:42 AM   Result Value Ref Range    Glucose (POC) 108 (H) 65 - 100 mg/dL    Performed by Crittenden Knovelg    GLUCOSE, POC    Collection Time: 12/27/19  4:44 PM   Result Value Ref Range    Glucose (POC) 238 (H) 65 - 100 mg/dL    Performed by Crittenden Gong            Assessment:     Principal Problem:    DKA (diabetic ketoacidoses) ([facility] (6/2/2017)    Active Problems:    Vascular dementia without behavioral disturbance ([facility] (10/20/2018)      Hypertension (2/6/2015)      Memory deficit (2/6/2015)      Lactic acidosis (6/10/2019)      Acute encephalopathy (6/10/2019)      ONEYDA (acute kidney injury) ([facility] (11/19/2019)        Plan: 1. Cont diabetic contrquateol. No meaningful improvement without CGM. 2.Hydration adequate.

## 2019-12-28 NOTE — PROGRESS NOTES
Patient Active Problem List   Diagnosis Code    Dyslipidemia E78.5    Hypertension I10    Hypothyroidism E03.9    Memory deficit R41.3    DKA (diabetic ketoacidoses) ([facility] E11.10    Vascular dementia without behavioral disturbance ([facility] [address] F01.50    Type 2 diabetes mellitus with hyperglycemia, with long-term current use of insulin ([facility]) E11.65, Z79.4    H/O: CVA (cerebrovascular accident) Z80.78    Lactic acidosis E87.2    Acute encephalopathy G93.40    ONEYDA (acute kidney injury) ([facility] [address] N17.9    Acute metabolic encephalopathy S93.21    Encephalopathy G93.40    Hypoglycemia due to type 2 diabetes mellitus ([facility]) E11.649    Leukocytosis D72.829    Septic shock ([facility]) A41.9, R65.21    Left arm cellulitis L03.114    Anemia D64.9     No Known Allergies    Current Facility-Administered Medications:     insulin lispro (HUMALOG) injection 4 Units, 4 Units, SubCUTAneous, TIDAC, Kely Booth MD, 4 Units at 12/27/19 1716    insulin glargine (LANTUS) injection 16 Units, 16 Units, SubCUTAneous, DAILY, Kely Booth MD    dextrose 10% infusion 0-250 mL, 0-250 mL, IntraVENous, PRN, Robinson Young MD    0.45% sodium chloride with KCl 20 mEq/L infusion, , IntraVENous, CONTINUOUS, Marco Young MD, Last Rate: 60 mL/hr at 12/28/19 0040    glucose chewable tablet 16 g, 4 Tab, Oral, PRN, Vishal Gloria MD    glucagon (GLUCAGEN) injection 1 mg, 1 mg, IntraMUSCular, PRN, Vishal Gloria MD    clopidogrel (PLAVIX) tablet 75 mg, 75 mg, Oral, DAILY, Nat Garcia MD, 75 mg at 12/27/19 7224    pravastatin (PRAVACHOL) tablet 80 mg, 80 mg, Oral, DAILY, Nat Garcia MD, 80 mg at 12/27/19 1183    metoprolol succinate (TOPROL-XL) XL tablet 25 mg, 25 mg, Oral, DAILY, Nat Garcia MD, 25 mg at 12/27/19 6565    amLODIPine (NORVASC) tablet 5 mg, 5 mg, Oral, DAILY, Nat Garcia MD, 5 mg at 12/27/19 3622    sodium chloride (NS) flush 5-40 mL, 5-40 mL, IntraVENous, Q8H, Nat Garcia MD, 10 mL at 12/28/19 0542    sodium chloride (NS) flush 5-40 mL, 5-40 mL, IntraVENous, PRN, Ellis Canavan, MD    acetaminophen (TYLENOL) solution 650 mg, 650 mg, Oral, Q4H PRN, Ellis Canavan, MD    docusate sodium (COLACE) capsule 100 mg, 100 mg, Oral, BID, Nat Garcia MD, 100 mg at 12/27/19 1949    heparin (porcine) injection 5,000 Units, 5,000 Units, SubCUTAneous, Q8H, Nat Garcia MD, 5,000 Units at 12/27/19 1235  Recent Results (from the past 24 hour(s))   GLUCOSE, POC    Collection Time: 12/27/19 11:42 AM   Result Value Ref Range    Glucose (POC) 108 (H) 65 - 100 mg/dL    Performed by Robbi Hanson    GLUCOSE, POC    Collection Time: 12/27/19  4:44 PM   Result Value Ref Range    Glucose (POC) 238 (H) 65 - 100 mg/dL    Performed by Robbi Hanson    CBC W/O DIFF    Collection Time: 12/28/19  5:28 AM   Result Value Ref Range    WBC 3.9 3.6 - 11.0 K/uL    RBC 3.46 (L) 3.80 - 5.20 M/uL    HGB 11.2 (L) 11.5 - 16.0 g/dL    HCT 33.6 (L) 35.0 - 47.0 %    MCV 97.1 80.0 - 99.0 FL    MCH 32.4 26.0 - 34.0 PG    MCHC 33.3 30.0 - 36.5 g/dL    RDW 14.6 (H) 11.5 - 14.5 %    PLATELET 895 077 - 273 K/uL    MPV 10.1 8.9 - 12.9 FL    NRBC 0.0 0  WBC    ABSOLUTE NRBC 0.00 0.00 - 0.50 K/uL   METABOLIC PANEL, BASIC    Collection Time: 12/28/19  5:28 AM   Result Value Ref Range    Sodium 141 136 - 145 mmol/L    Potassium 3.9 3.5 - 5.1 mmol/L    Chloride 109 (H) 97 - 108 mmol/L    CO2 26 21 - 32 mmol/L    Anion gap 6 5 - 15 mmol/L    Glucose 346 (H) 65 - 100 mg/dL    BUN 14 6 - 20 MG/DL    Creatinine 0.74 0.55 - 1.02 MG/DL    BUN/Creatinine ratio 19 12 - 20      GFR est AA >60 >60 ml/min/1.73m2    GFR est non-AA >60 >60 ml/min/1.73m2    Calcium 7.6 (L) 8.5 - 10.1 MG/DL   GLUCOSE, POC    Collection Time: 12/28/19  7:42 AM   Result Value Ref Range    Glucose (POC) 298 (H) 65 - 100 mg/dL    Performed by Unkown       Patient Vitals for the past 24 hrs:   Temp Pulse Resp BP SpO2   12/28/19 0713 97.8 °F (36.6 °C) 79 16 157/60 99 %   12/28/19 0345 97.8 °F (36.6 °C) 64 16 119/45 99 %   12/27/19 2231 98.1 °F (36.7 °C) 73 16 143/63 98 %   12/27/19 1957 97.7 °F (36.5 °C) 77 16 126/56 100 %   12/27/19 1700 98 °F (36.7 °C) 80 16 145/63    12/27/19 1039 99.2 °F (37.3 °C) 90 16 161/78      Wants to go home today. She feels good. Lungs clear. Heart RRR. DKA  DM2    DC per attending.

## 2019-12-28 NOTE — PROGRESS NOTES
Bedside shift change report given to Yohan Membreno RN (oncoming nurse) by Key Lew RN (offgoing nurse). Report included the following information Kardex, Intake/Output and Recent Results.

## 2019-12-28 NOTE — PROGRESS NOTES
Bedside shift change report given to Ronel Friday, VIN (oncoming nurse) by Jeni Walton RN (offgoing nurse). Report included the following information Kardex, Intake/Output and Recent Results.

## 2019-12-29 LAB
GLUCOSE BLD STRIP.AUTO-MCNC: 113 MG/DL (ref 65–100)
GLUCOSE BLD STRIP.AUTO-MCNC: 136 MG/DL (ref 65–100)
GLUCOSE BLD STRIP.AUTO-MCNC: 275 MG/DL (ref 65–100)
SERVICE CMNT-IMP: ABNORMAL

## 2019-12-29 PROCEDURE — 82962 GLUCOSE BLOOD TEST: CPT

## 2019-12-29 PROCEDURE — 65660000000 HC RM CCU STEPDOWN

## 2019-12-29 PROCEDURE — 74011250636 HC RX REV CODE- 250/636: Performed by: INTERNAL MEDICINE

## 2019-12-29 PROCEDURE — 74011636637 HC RX REV CODE- 636/637: Performed by: INTERNAL MEDICINE

## 2019-12-29 PROCEDURE — 74011250637 HC RX REV CODE- 250/637: Performed by: INTERNAL MEDICINE

## 2019-12-29 RX ADMIN — DOCUSATE SODIUM 100 MG: 100 CAPSULE, LIQUID FILLED ORAL at 17:18

## 2019-12-29 RX ADMIN — HEPARIN SODIUM 5000 UNITS: 5000 INJECTION INTRAVENOUS; SUBCUTANEOUS at 21:20

## 2019-12-29 RX ADMIN — Medication 10 ML: at 21:20

## 2019-12-29 RX ADMIN — INSULIN GLARGINE 16 UNITS: 100 INJECTION, SOLUTION SUBCUTANEOUS at 08:46

## 2019-12-29 RX ADMIN — CLOPIDOGREL BISULFATE 75 MG: 75 TABLET, FILM COATED ORAL at 08:45

## 2019-12-29 RX ADMIN — METOPROLOL SUCCINATE 25 MG: 25 TABLET, EXTENDED RELEASE ORAL at 08:45

## 2019-12-29 RX ADMIN — DOCUSATE SODIUM 100 MG: 100 CAPSULE, LIQUID FILLED ORAL at 08:45

## 2019-12-29 RX ADMIN — INSULIN LISPRO 4 UNITS: 100 INJECTION, SOLUTION INTRAVENOUS; SUBCUTANEOUS at 17:18

## 2019-12-29 RX ADMIN — PRAVASTATIN SODIUM 80 MG: 40 TABLET ORAL at 08:45

## 2019-12-29 RX ADMIN — INSULIN LISPRO 4 UNITS: 100 INJECTION, SOLUTION INTRAVENOUS; SUBCUTANEOUS at 08:46

## 2019-12-29 RX ADMIN — Medication 10 ML: at 06:36

## 2019-12-29 RX ADMIN — HEPARIN SODIUM 5000 UNITS: 5000 INJECTION INTRAVENOUS; SUBCUTANEOUS at 12:24

## 2019-12-29 RX ADMIN — INSULIN LISPRO 4 UNITS: 100 INJECTION, SOLUTION INTRAVENOUS; SUBCUTANEOUS at 12:24

## 2019-12-29 RX ADMIN — HEPARIN SODIUM 5000 UNITS: 5000 INJECTION INTRAVENOUS; SUBCUTANEOUS at 06:34

## 2019-12-29 RX ADMIN — AMLODIPINE BESYLATE 5 MG: 5 TABLET ORAL at 08:46

## 2019-12-29 RX ADMIN — Medication 10 ML: at 13:45

## 2019-12-29 NOTE — PROGRESS NOTES
0700- Bedside and Verbal shift change report given to K. Tonia Cooks, RN (oncoming nurse) by Conor Rea RN (offgoing nurse). Report included the following information SBAR, Kardex, Intake/Output, MAR and Recent Results   Py resting in bed, no needs at this time. 1122- Pt has no needs, will continue to monitor.

## 2019-12-29 NOTE — PROGRESS NOTES
Patient Active Problem List   Diagnosis Code    Dyslipidemia E78.5    Hypertension I10    Hypothyroidism E03.9    Memory deficit R41.3    DKA (diabetic ketoacidoses) ([facility] [address] E11.10    Vascular dementia without behavioral disturbance ([facility] [address] F01.50    Type 2 diabetes mellitus with hyperglycemia, with long-term current use of insulin ([facility]) E11.65, Z79.4    H/O: CVA (cerebrovascular accident) Z80.78    Lactic acidosis E87.2    Acute encephalopathy G93.40    ONEYDA (acute kidney injury) ([facility] [address] N17.9    Acute metabolic encephalopathy D97.61    Encephalopathy G93.40    Hypoglycemia due to type 2 diabetes mellitus ([facility]) E11.649    Leukocytosis D72.829    Septic shock ([facility]) A41.9, R65.21    Left arm cellulitis L03.114    Anemia D64.9       Current Facility-Administered Medications:     insulin lispro (HUMALOG) injection 4 Units, 4 Units, SubCUTAneous, TIDAC, Ryan Grubbs MD, 4 Units at 12/29/19 0846    insulin glargine (LANTUS) injection 16 Units, 16 Units, SubCUTAneous, DAILY, Ryan Grubbs MD, 16 Units at 12/29/19 0846    dextrose 10% infusion 0-250 mL, 0-250 mL, IntraVENous, PRN, Victoriano Young MD    0.45% sodium chloride with KCl 20 mEq/L infusion, , IntraVENous, CONTINUOUS, Marco Young MD, Last Rate: 60 mL/hr at 12/28/19 1807    glucose chewable tablet 16 g, 4 Tab, Oral, PRN, Barb Garcia MD    glucagon (GLUCAGEN) injection 1 mg, 1 mg, IntraMUSCular, PRN, Barb Garcia MD    clopidogrel (PLAVIX) tablet 75 mg, 75 mg, Oral, DAILY, Nat Garcia MD, 75 mg at 12/29/19 0845    pravastatin (PRAVACHOL) tablet 80 mg, 80 mg, Oral, DAILY, Nat Garcia MD, 80 mg at 12/29/19 0845    metoprolol succinate (TOPROL-XL) XL tablet 25 mg, 25 mg, Oral, DAILY, Nat Garcia MD, 25 mg at 12/29/19 0845    amLODIPine (NORVASC) tablet 5 mg, 5 mg, Oral, DAILY, Santos Barr MD, 5 mg at 12/29/19 0846    sodium chloride (NS) flush 5-40 mL, 5-40 mL, IntraVENous, Q8H, Santos Barr MD, 10 mL at 12/29/19 0636    sodium chloride (NS) flush 5-40 mL, 5-40 mL, IntraVENous, PRN, Young Aguilar MD    acetaminophen (TYLENOL) solution 650 mg, 650 mg, Oral, Q4H PRN, Young Aguilar MD    docusate sodium (COLACE) capsule 100 mg, 100 mg, Oral, BID, Nat Garcia MD, 100 mg at 12/29/19 0845    heparin (porcine) injection 5,000 Units, 5,000 Units, SubCUTAneous, Q8H, Nat Garcia MD, 5,000 Units at 12/29/19 6542  Recent Results (from the past 24 hour(s))   GLUCOSE, POC    Collection Time: 12/28/19 11:36 AM   Result Value Ref Range    Glucose (POC) 399 (H) 65 - 100 mg/dL    Performed by Ashley Poole    GLUCOSE, POC    Collection Time: 12/28/19  5:18 PM   Result Value Ref Range    Glucose (POC) 365 (H) 65 - 100 mg/dL    Performed by Ashley Poole    GLUCOSE, POC    Collection Time: 12/28/19  9:15 PM   Result Value Ref Range    Glucose (POC) 185 (H) 65 - 100 mg/dL    Performed by Don FRASER    GLUCOSE, POC    Collection Time: 12/29/19  7:24 AM   Result Value Ref Range    Glucose (POC) 113 (H) 65 - 100 mg/dL    Performed by Krissy Salas      Visit Vitals  /60 (BP 1 Location: Left arm, BP Patient Position: At rest)   Pulse 66   Temp 97.9 °F (36.6 °C)   Resp 18   Ht 5' 2\" (1.575 m)   Wt 66 kg (145 lb 8.1 oz)   SpO2 100%   BMI 26.61 kg/m²     Exam: clear lungs, heart RRR    Stable on current meds.

## 2019-12-29 NOTE — PROGRESS NOTES
2038  Bedside report given to Eze Avilez RN by Sea Harp RN. Report included SBAR, ED Report, Labs, and Cardiac Rhythm NSR. Patient in bed resting well. Vitals are stable.

## 2019-12-30 VITALS
HEART RATE: 73 BPM | RESPIRATION RATE: 18 BRPM | WEIGHT: 145.5 LBS | SYSTOLIC BLOOD PRESSURE: 176 MMHG | DIASTOLIC BLOOD PRESSURE: 55 MMHG | BODY MASS INDEX: 26.78 KG/M2 | HEIGHT: 62 IN | OXYGEN SATURATION: 100 % | TEMPERATURE: 97.7 F

## 2019-12-30 LAB
GLUCOSE BLD STRIP.AUTO-MCNC: 80 MG/DL (ref 65–100)
SERVICE CMNT-IMP: NORMAL

## 2019-12-30 PROCEDURE — 74011250636 HC RX REV CODE- 250/636: Performed by: INTERNAL MEDICINE

## 2019-12-30 PROCEDURE — 82962 GLUCOSE BLOOD TEST: CPT

## 2019-12-30 PROCEDURE — 74011250637 HC RX REV CODE- 250/637: Performed by: INTERNAL MEDICINE

## 2019-12-30 PROCEDURE — 74011636637 HC RX REV CODE- 636/637: Performed by: INTERNAL MEDICINE

## 2019-12-30 RX ADMIN — METOPROLOL SUCCINATE 25 MG: 25 TABLET, EXTENDED RELEASE ORAL at 09:41

## 2019-12-30 RX ADMIN — AMLODIPINE BESYLATE 5 MG: 5 TABLET ORAL at 09:41

## 2019-12-30 RX ADMIN — Medication 10 ML: at 06:00

## 2019-12-30 RX ADMIN — HEPARIN SODIUM 5000 UNITS: 5000 INJECTION INTRAVENOUS; SUBCUTANEOUS at 06:00

## 2019-12-30 RX ADMIN — CLOPIDOGREL BISULFATE 75 MG: 75 TABLET, FILM COATED ORAL at 09:41

## 2019-12-30 RX ADMIN — PRAVASTATIN SODIUM 80 MG: 40 TABLET ORAL at 09:41

## 2019-12-30 RX ADMIN — DOCUSATE SODIUM 100 MG: 100 CAPSULE, LIQUID FILLED ORAL at 09:41

## 2019-12-30 RX ADMIN — INSULIN LISPRO 4 UNITS: 100 INJECTION, SOLUTION INTRAVENOUS; SUBCUTANEOUS at 09:41

## 2019-12-30 RX ADMIN — INSULIN GLARGINE 16 UNITS: 100 INJECTION, SOLUTION SUBCUTANEOUS at 09:43

## 2019-12-30 NOTE — PROGRESS NOTES
0375 Bedside report given to Miles Najjar, RN by Arielle Ramires RN. Report included SBAR, ED Report, Labs, and Cardiac Rhythm NSR. Patient in bed resting well. Vitals are stable. 1100 I have reviewed discharge instructions with the patient. The patient verbalized understanding. Patient taken to awaiting family by way of wheelchair to the front lobby exit.

## 2019-12-30 NOTE — PROGRESS NOTES
ALEKSANDAR PLAN:     Plan Home with family assistance. Pt refused Hospital to Home follow up visit. PCP follow up appt with Dr. Isatu Turner in one week. SonMandeep will be here by 11 AM to transport her home in car. Second IM letter Delivered. 10:27 PM   Medicare pt has received, reviewed, and signed 2nd IM letter informing them of their right to appeal the discharge. Signed copy has been placed on pt bedside chart. 9:43 AM  CM noted DC order. CM emailed CM Specialist to make PCP appt with Dr. Isatu Turner in one week. CM met with Pt and offered Hospital to Home follow up appt d/t Dx of DM and Pt refused and said that she would go see Dr. Isatu Turner in one week. CM talked to sonMandeep and he will be here around 11 AM to transport pt home in car. No further CM needs. Care Management Interventions  PCP Verified by CM: Yes  Palliative Care Criteria Met (RRAT>21 & CHF Dx)?: No  Mode of Transport at Discharge:  Other (see comment)  Transition of Care Consult (CM Consult): Discharge [facility] hospitals: Yes  Mahesh Signup: No  Discharge Durable Medical Equipment: No  Physical Therapy Consult: Yes  Occupational Therapy Consult: Yes  Speech Therapy Consult: No  Current Support Network: Lives Alone, Family Lives [place], Own Home  Confirm Follow Up Transport: Family  Discharge Location  Discharge Placement: Home with family assistance    Yohan,  [address]

## 2019-12-30 NOTE — DISCHARGE SUMMARY
[address] SUMMARY    Name:  Roseanna Alonzo  MR#:  [id number]  :  1940  ACCOUNT #:  [de-identified]  ADMIT DATE:  2019  DISCHARGE DATE:  2019    HISTORY OF PRESENT ILLNESS:  The patient is a 78-year-old lady who presented to emergency room because of increasing lethargy, intermittent nausea, vomiting and elevated blood sugars. Apparently for the last several days, she did not take any insulin. She was admitted to the emergency room and found to be in diabetic ketoacidosis. She has had multiple admissions for this, primarily related to her noncompliance. She has dementia and she does have a family, but the family unfortunately is not providing the types of services and care that she requires. I have attempted to get continued glucose monitoring on her for the last 6 months unsuccessfully. There were no apparent obvious secondary causes to her DKA. Importantly, she does not eat at the same time everyday or eat the proper foods that she should take or check blood sugars. Repeated things that need to be done on a consistent basis with the family and I have met with no major change in assistance or behavior. Past medical history, social history, review of systems, family history, physical examination is as in the admitting H and P.    LABORATORY VALUES:  Abnormalities on the comprehensive profile on admission reveals potassium of 6.9, CO2 of 5, BUN and creatinine 21 and 1.70 with a glucose of 870. Hemoglobin A1c was 11.4. At the time of discharge, CO2 was 26, BUN and creatinine 14 and 0.74 respectively. Initial hemoglobin 7.8, white count 10.4, MCV is 103.6, platelet count 417,886 with following differential 85 segs, 9 lymphocytes, and 5 monocytes, and 1 immature granulocyte. At the time of discharge, white count was 3.9 with a hemoglobin of 11.2. Urinalysis revealed greater than 1000 mg/dL glycosuria, and 20 mg/dL proteinuria.   Blood cultures were negative. RADIOGRAPHS:  Chest x-ray negative. HOSPITAL COURSE:  The patient was admitted to the intensive care unit and placed on the insulin drip with the usual protocol. Resolution occurred within less than 24 hours with normalization of her CO2. Needless to say because of the CNS acidosis from DKA, there was a lag in her mentation such that after 48 hours, she was back to her baseline. There were no obvious secondary causes other than noncompliance. I spoke to no family members during this hospital stay. I put her back on her usual insulin which is a basal insulin which she takes in the morning and prandial insulin, short-acting insulin analogue a.c. breakfast, lunch, and dinner. Blood sugars improved significantly. Unfortunately, I could not basalize her too much because blood sugars would predictably drop. She had no further GI symptoms, was eating well prior to discharge. At the time of discharge, she was back to her baseline, although intermittently confused. I will discuss this with her daughter again. FINAL DIAGNOSES:  1. Recurrent diabetic ketoacidosis. 2.  Diabetes mellitus type 1B. 3.  Dehydration. 4.  Dementia. 5.  Primary hypertension. 6.  Hypothyroidism, currently euthyroid. 7.  Dyslipidemia. DISPOSITION:  1. The patient will be discharged home ambulatory on an ADA diet with at bedtime snacks. 2.  I have emphasized to the patient as I have done repetitively the importance of eating at the same time everyday. Additionally, blood sugars have to be checked at least twice a day before breakfast and before dinner. Communication needs to occur with me at least every other day regarding blood sugars, so appropriate adjustments can indeed be made.   Her home situation is horrible because she has a disabled son who is not capable of assisting in a meaningful manner and apparently, those siblings that do no live at home are not offering the type of care that she really truly needs.  3.  The patient remains a full code. 4.  Discharge medications include the following:  Alphagan 0.15% one drop both eyes b.i.d., NovoLog 4 units a.c. breakfast, lunch, and dinner; degludec 16 units q. a.m., Levoxyl 0.175 mg daily, amlodipine 10 mg daily, clopidogrel 75 mg daily, metoprolol succinate 25 mg daily, and pravastatin 80 mg at bedtime. 5.  She will return to the office in the next 3 days. 6.  I will again continue obtaining a CGM for the patient. 7.  I have reminded the family repetitively as I have done previously the importance of eating at the same time everyday and checking blood sugars. Unfortunately, to date, data of blood sugars is not met with any type of action which is one of the primary problems.       MD KENNETH Connolly/[id number]  D:  12/30/2019 8:52  T:  12/30/2019 14:05  JOB #:  [id number]

## 2019-12-30 NOTE — PROGRESS NOTES
TRANSFER - OUT REPORT:    Verbal report given to Baron Lopez RN on Connie Bennett for routine progression of care       Report consisted of patients Situation, Background, Assessment and   Recommendations(SBAR). Information from the following report(s) SBAR, Kardex, [facility] and Recent Results was reviewed with the receiving nurse. Lines:   Peripheral IV 12/25/19 Right; Lower Forearm (Active)   Site Assessment Clean, dry, & intact 12/29/2019  3:59 PM   Phlebitis Assessment 0 12/29/2019  3:59 PM   Infiltration Assessment 0 12/29/2019  3:59 PM   Dressing Status Clean, dry, & intact 12/29/2019  3:59 PM   Dressing Type Transparent;Tape 12/29/2019  3:59 PM   Hub Color/Line Status Pink; Infusing 12/29/2019  3:59 PM       Peripheral IV 12/25/19 Right Antecubital (Active)   Site Assessment Clean, dry, & intact 12/29/2019  3:59 PM   Phlebitis Assessment 0 12/29/2019  3:59 PM   Infiltration Assessment 0 12/29/2019  3:59 PM   Dressing Status Clean, dry, & intact 12/29/2019  3:59 PM   Dressing Type Transparent;Tape 12/29/2019  3:59 PM   Hub Color/Line Status Green;Capped 12/29/2019  3:59 PM   Action Taken Open ports on tubing capped 12/29/2019  3:59 PM   Alcohol Cap Used Yes 12/29/2019  7:23 AM        Opportunity for questions and clarification was provided.

## 2019-12-30 NOTE — PROGRESS NOTES
Appointment Information  The following appointments have been successfully scheduled:    Date/time Friday, January 03, 2020 01:15 PM  Patient [place] Hill [id number] (67WH F) #[id number]  Department [facility] OFFICE  Appointment type Transitional Care  Provider [facility]

## 2019-12-30 NOTE — DISCHARGE INSTRUCTIONS
General Discharge Instructions    Patient ID:  Hector Valdez  [id number]  69 y.o.  1940    Patient Instructions          The following personal items were collected during your admission and were returned to you. Take Home Medications             What to do at Home    Recommended diet: Diabetic Diet    Recommended activity: Activity as tolerated    Follow-up with Christine Whitney MD  in 3 days. Information obtained by :  I understand that if any problems occur once I am at home I am to contact my physician. I understand and acknowledge receipt of the instructions indicated above.                                                                                                                                            Physician's or R.N.'s Signature                                                                  Date/Time                                                                                                                                              Patient or Representative Signature                                                          Date/Time

## 2019-12-31 ENCOUNTER — PATIENT OUTREACH (OUTPATIENT)
Dept: INTERNAL MEDICINE CLINIC | Age: 79
End: 2019-12-31

## 2019-12-31 NOTE — PROGRESS NOTES
Hospital Discharge Follow-Up      Date/Time:  12/31/2019 6:14 AM    Patient was readmission to [facility] on 12/25/19 and discharged on 12/30/19 for DKA. The physician discharge summary was available at the time of outreach. Patient did not show for appt on 12/31/19 and appears to have rescheduled for 1/2/20. She also is scheduled on 1/3/20 for [facility]. I contacted Dr. Kathleen Martinez office to make aware so they can contact pt/daughter to adjust.       Top Challenges reviewed with the provider     Advance Care Planning:   Does patient have an Advance Directive:  not on file        Method of communication with provider :chart routing    Inpatient RRAT score: 16- However this writer feels that pt is high risk since this is 3rd readmission within 30 days for same dx of DKA d/t non adherence  Was this a readmission? yes     Disease Specific:   N/A    Patients top risk factors for readmission:  functional cognitive ability, lack of knowledge about disease, level of motivation, medical condition, medication management, transportation    Home Health orders at discharge: patient refused    Durable Medical Equipment ordered at discharge: none    Medication(s):   New Medications at Discharge: none  Changed Medications at Discharge: none  Discontinued Medications at Discharge: none    Referral to Pharm D needed: has been referred in recent past     Current Outpatient Medications   Medication Sig    insulin degludec (TRESIBA FLEXTOUCH U-100) 100 unit/mL (3 mL) inpn 16 Units by SubCUTAneous route daily.  levothyroxine (SYNTHROID) 175 mcg tablet TAKE 1 TABLET BY MOUTH EVERY DAY    amLODIPine (NORVASC) 10 mg tablet TAKE 1 TABLET BY MOUTH EVERY DAY IN THE MORNING    pravastatin (PRAVACHOL) 80 mg tablet TAKE 1 TABLET BY MOUTH EVERY DAY    clopidogrel (PLAVIX) 75 mg tab TAKE 1 TAB BY MOUTH DAILY.  metoprolol succinate (TOPROL-XL) 25 mg XL tablet Take 1 Tab by mouth daily.     insulin aspart U-100 (NOVOLOG) 100 unit/mL (3 mL) inpn 4 units AC breakfast,lunch, and dinner    brimonidine (ALPHAGAN) 0.15 % ophthalmic solution Administer 1 Drop to both eyes two (2) times a day. No current facility-administered medications for this visit. There are no discontinued medications. [facility] follow up appointment(s):   Future Appointments   Date Time Provider Tawnya Santamariai   12/31/2019  3:45 PM Hernando Enrique MD [facility] MAIN SAME[facility] [address]   1/3/2020  1:15 PM Hernando Enrique MD [facility] MAIN SAME[facility] [address]   7/8/2020  1:15 PM Woody Cueto MD [address] [facility]      Non-[facility] follow up appointment(s): [facility]    [facility]  [facility] provided       Goals        Chronic Disease     Prepare patients and caregivers for end of life decisions (ie. need for hospice, pain management, symptom relief, advance directives etc.)        12/13/2019  1:30pm  Patient does not have an Advance Medical Directive on file. She has her daughter, Bret Wynne listed as her primary healthcare decision maker and, her two sons Saroj John and Ramesh Both as second and third contacts. SW will review and discuss the importance of having a completed AMD on file and offer assistance with completing one if interested. [address] [facility] Transitions Team            Post Hospitalization     Prevent Readmission        12/5/2019  - Pt was readmitted on 12/1/19 for DKA again, along with cellulitis/sepsis left arm  - Discharged on Keflex 500 mg BID x 5 days- daughter placed pills in pt's pillbox  - Encouraged pt to check her blood sugar more frequently and to write down the readings  - Asked pt to tell me what she learned about diabetes in the hospital- states \"II was told that I need to eat less\". - Re-educated pt on not eating less, but instead eating 3 balanced meals/day and a pm snack.  She does not like to eat lunch- encouraged pt to try to at least eat half of a sandwich- she likes tuna and peanut butter sandwiches and states she would be able to make this for herself. She did not remember learning about the plate method so I reviewed again. She did remember that she was told she can drink milk and was happy with this. Advised her that a small glass of milk or yogurt could be her evening snack before bed and she liked that idea. - Pt will need continued reinforcement and reeducation surrounding meds and diet d/t her forgetfulness/dementia  - Consider [facility] outpt rehab for PT since she is not homebound- requested for PCP to place referral  - Red flags reviewed with pt's daughter and discussed when to call 911 vs when to contact [facility] or PCP office  MONTSE Pagan RN, [facility]  Care Transitions Nurse   [address] [place] [address] Ambulatory Care Coordination Team  11/25/2019  - Pt will take medications same time each day and be sure to eat 3 meals and snack to prevent hypoglycemia  - PharmD referral placed to aide in further diabetes education  - Pt to f/u with Dr. Isiah Kitchen on 11/26/19  - Was not d/c'd with [facility], request for PCP to consider HHPT and SN referral  MONTSE Pagan RN, [facility]  Care Transitions Nurse   [address] [place] [address] Care Coordination Team             Supportive resources in place to maintain patient in the community (ie. Home Health, DME equipment, refer to, medication assistant plan, etc.)        12/5/2019  - Pt with 2 back to back admissions for DKA 2/2 non adherence to diet and medications 2/2 to early dementia  - Does not qualify for [facility] d/t not homebound  - Reviewed [facility] with pt's daughter and expressed interest. I sent request to pt's PCP to place referral for María Elena Valdez  - Provided pt's daughter contact information for [facility] to see if pt qualifies for any services/assistance  - Placed referral to [address] [place] [address].  Daughter would prefer for SW to call the week of 12/9 around 1:30 in afternoon if possible as that is when she is on lunch break  - Pt's daughter unsure of what still needs to occur for Medicaid application. States that someone called her today but the information she was requesting needed to come from her mother? ?  - Provided information and contact for Dallas Quiñonez  - May benefit in getting referral to Sarah Briscoe - will review at next call  - Referral to Radha Rubio for future Diabetes education and med assessment  MONTSE Pagan RN, [facility]  Care Transitions Nurse   [address] [place] [address] Ambulatory Care Coordination Team         Supportive resources: medicaid assistance and options for caregiver support        12/17/2019  1:30pm  SW attempted to contact patient's daughter for follow up. SW left a detailed voicemail requesting a call back, and suggested that she leave on my voicemail best time/date to reach her. SW will follow up at a later date. 12/13/2019  1:25pm  Patient's daughter requested assistance with understanding medicaid process and initiating application. SW contacted daughter however, she reported that she was unable to talk and stated she would contact SW [id number] at 1:30pm.      Thomas Reeves will attempt contact on 12-16-19 if call is not returned. YOLANDA will offer assistance with medicaid process and additional resources if needed.         [facility] Transitions Team

## 2020-01-02 ENCOUNTER — PATIENT OUTREACH (OUTPATIENT)
Dept: INTERNAL MEDICINE CLINIC | Age: 80
End: 2020-01-02

## 2020-01-02 ENCOUNTER — OFFICE VISIT (OUTPATIENT)
Dept: INTERNAL MEDICINE CLINIC | Age: 80
End: 2020-01-02

## 2020-01-02 VITALS
TEMPERATURE: 98.2 F | HEART RATE: 70 BPM | WEIGHT: 163.8 LBS | BODY MASS INDEX: 30.14 KG/M2 | RESPIRATION RATE: 16 BRPM | OXYGEN SATURATION: 98 % | SYSTOLIC BLOOD PRESSURE: 145 MMHG | HEIGHT: 62 IN | DIASTOLIC BLOOD PRESSURE: 61 MMHG

## 2020-01-02 DIAGNOSIS — E03.2 HYPOTHYROIDISM DUE TO MEDICATION: ICD-10-CM

## 2020-01-02 DIAGNOSIS — R60.9 EDEMA, UNSPECIFIED TYPE: ICD-10-CM

## 2020-01-02 DIAGNOSIS — Z79.4 TYPE 2 DIABETES MELLITUS WITH HYPERGLYCEMIA, WITH LONG-TERM CURRENT USE OF INSULIN (HCC): Primary | ICD-10-CM

## 2020-01-02 DIAGNOSIS — I50.30 DIASTOLIC CONGESTIVE HEART FAILURE, UNSPECIFIED HF CHRONICITY (HCC): ICD-10-CM

## 2020-01-02 DIAGNOSIS — E11.65 TYPE 2 DIABETES MELLITUS WITH HYPERGLYCEMIA, WITH LONG-TERM CURRENT USE OF INSULIN (HCC): Primary | ICD-10-CM

## 2020-01-02 DIAGNOSIS — I10 ESSENTIAL HYPERTENSION: ICD-10-CM

## 2020-01-02 NOTE — PROGRESS NOTES
Hospital Discharge Follow-Up      Date/Time:  2020 1:08 PM    Patient was readmission to [facility] on 19 and discharged on 19 for DKA. The physician discharge summary was available at the time of outreach. Patient was contacted within 2 business days of discharge. Top Challenges reviewed with the provider   Briefly spoke to pt who is not able to correctly tell me her insulin dosages, what she is eating or what her blood sugars have been running  Briefly spoke to pt's daughter, Cole Jose who is currently at work but confirmed she is bringing pt to Dr. Omalley Pump office this afternoon    Advance Care Planning:   Does patient have an Advance Directive:  not on file        Method of communication with provider :chart routing    Inpatient RRAT score: 16- However this writer feels that pt is high risk since this is 3rd readmission within 30 days for same dx of DKA d/t non adherence  Was this a readmission? yes   Patient stated reason for the readmission: \"My sugars were low\" However pt's blood sugar on admission was [id number] Transition Nurse (CTN) contacted the pt and daughter by telephone to perform post hospital discharge assessment. Verified name and  with patient as identifiers. Provided introduction to self, and explanation of the CTN role. Patient states she received hospital discharge instructions but is not sure where they are. CTN briefly reviewed discharge instructions and red flags with patient who verbalized understanding, but due to short term memory issues is likely not remembering any of it. . Patient given an opportunity to ask questions and does not have any further questions or concerns at this time. The patient's daughter agrees to contact the PCP office for questions related to their healthcare. CTN provided contact information for future reference.     Disease Specific:   N/A    Patients top risk factors for readmission:  financial, functional cognitive ability, lack of knowledge about disease, level of motivation, medical condition, medication management, polypharmacy, transportation    [address] orders at discharge: patient refused    Medication(s):   New Medications at Discharge: none  Changed Medications at Discharge: none  Discontinued Medications at Discharge: none    Medication reconciliation was not performed with patient since she was not able to correctly review her medications. Pt is seeing PCP today  who verbalizes understanding of administration of home medications. .    Current Outpatient Medications   Medication Sig    insulin degludec (TRESIBA FLEXTOUCH U-100) 100 unit/mL (3 mL) inpn 16 Units by SubCUTAneous route daily.  levothyroxine (SYNTHROID) 175 mcg tablet TAKE 1 TABLET BY MOUTH EVERY DAY    amLODIPine (NORVASC) 10 mg tablet TAKE 1 TABLET BY MOUTH EVERY DAY IN THE MORNING    pravastatin (PRAVACHOL) 80 mg tablet TAKE 1 TABLET BY MOUTH EVERY DAY    clopidogrel (PLAVIX) 75 mg tab TAKE 1 TAB BY MOUTH DAILY.  metoprolol succinate (TOPROL-XL) 25 mg XL tablet Take 1 Tab by mouth daily.  insulin aspart U-100 (NOVOLOG) 100 unit/mL (3 mL) inpn 4 units AC breakfast,lunch, and dinner    brimonidine (ALPHAGAN) 0.15 % ophthalmic solution Administer 1 Drop to both eyes two (2) times a day. No current facility-administered medications for this visit. There are no discontinued medications.     [facility] follow up appointment(s):   Future Appointments   Date Time Provider Tawnya Evans   1/2/2020  4:45 PM Montserrat Bansal MD [place] [id number] [facility]   1/3/2020  1:15 PM Montserrat Bansal MD [place] [address] [facility] [address]   7/8/2020  1:15 PM Khang Sandra MD [address] [facility]      Non-[facility] follow up appointment(s): none  [facility]:  previously given       Goals        Chronic Disease     Prepare patients and caregivers for end of life decisions (ie. need for hospice, pain management, symptom relief, advance directives etc.) 12/13/2019  1:30pm  Patient does not have an Advance Medical Directive on file. She has her daughter, Prashanth Stanley listed as her primary healthcare decision maker and, her two sons Manuelito Salamanca and Yariel Gambino as second and third contacts. SW will review and discuss the importance of having a completed AMD on file and offer assistance with completing one if interested. [facility] Team            Post Hospitalization     Prevent Readmission        12/5/2019  - Pt was readmitted on 12/1/19 for DKA again, along with cellulitis/sepsis left arm  - Discharged on Keflex 500 mg BID x 5 days- daughter placed pills in pt's pillbox  - Encouraged pt to check her blood sugar more frequently and to write down the readings  - Asked pt to tell me what she learned about diabetes in the hospital- states \"II was told that I need to eat less\". - Re-educated pt on not eating less, but instead eating 3 balanced meals/day and a pm snack. She does not like to eat lunch- encouraged pt to try to at least eat half of a sandwich- she likes tuna and peanut butter sandwiches and states she would be able to make this for herself. She did not remember learning about the plate method so I reviewed again. She did remember that she was told she can drink milk and was happy with this. Advised her that a small glass of milk or yogurt could be her evening snack before bed and she liked that idea. - Pt will need continued reinforcement and reeducation surrounding meds and diet d/t her forgetfulness/dementia  - Consider [facility] out[facility] rehab for PT since she is not homebound- requested for PCP to place referral  - Red flags reviewed with pt's daughter and discussed when to call 911 vs when to contact [facility] Quiñonez or PCP office  MONTSE Pagan RN, [facility]  Care Transitions Nurse   [facility] Ambulatory Care Coordination Team  11/25/2019  - Pt will take medications same time each day and be sure to eat 3 meals and snack to prevent hypoglycemia  - PharmD referral placed to aide in further diabetes education  - Pt to f/u with Dr. Dann Klein on 11/26/19  - Was not d/c'd with [facility], request for PCP to consider HHPT and SN referral  MONTSE Pagan RN, [facility]  Care Transitions Nurse   [address] [place] [address] Coordination Team             Supportive resources in place to maintain patient in the community (ie. Home Health, DME equipment, refer to, medication assistant plan, etc.)        12/5/2019  - Pt with 2 back to back admissions for DKA 2/2 non adherence to diet and medications 2/2 to early dementia  - Does not qualify for [place] d/t not homebound  - Reviewed [facility] Therapy with pt's daughter and expressed interest. I sent request to pt's PCP to place referral for Jacskon Carpio  - Provided pt's daughter contact information for [facility] to see if pt qualifies for any services/assistance  - Placed referral to [address] [place] [address]. Daughter would prefer for SW to call the week of 12/9 around 1:30 in afternoon if possible as that is when she is on lunch break  - Pt's daughter unsure of what still needs to occur for Medicaid application. States that someone called her today but the information she was requesting needed to come from her mother? ?  - Provided information and contact for Dallas Quiñonez  - May benefit in getting referral to Sarah Briscoe - will review at next call  - Referral to Radha Rubio for future Diabetes education and med assessment  MONTSE Pagan RN, [facility]  Care Transitions Nurse   [address] [place] [address] Coordination Team         Supportive resources: medicaid assistance and options for caregiver support        12/17/2019  1:30pm  SW attempted to contact patient's daughter for follow up.   SW left a detailed voicemail requesting a call back, and suggested that she leave on my voicemail best time/date to reach her. YOLANDA will follow up at a later date. 12/13/2019  1:25pm  Patient's daughter requested assistance with understanding medicaid process and initiating application. SW contacted daughter however, she reported that she was unable to talk and stated she would contact SW [id number] at 1:30pm.      Karena Queen will attempt contact on 12-16-19 if call is not returned. SW will offer assistance with medicaid process and additional resources if needed.         [address] [facility] Transitions Team

## 2020-01-03 ENCOUNTER — DOCUMENTATION ONLY (OUTPATIENT)
Dept: INTERNAL MEDICINE CLINIC | Age: 80
End: 2020-01-03

## 2020-01-03 ENCOUNTER — HOME HEALTH ADMISSION (OUTPATIENT)
Dept: HOME HEALTH SERVICES | Facility: HOME HEALTH | Age: 80
End: 2020-01-03
Payer: MEDICARE

## 2020-01-03 NOTE — PROGRESS NOTES
Spoke with Tavo Oden (Ms. Darian Albert daughter) in regards to [facility] form. I informed her that her form is ready, and she stated that she would come into the office ; informed her that everything would at the front dest on the second floor. She verbalized understanding.

## 2020-01-03 NOTE — PROGRESS NOTES
[address] [place] [address] and Primary Care  [place] [address]  [address] [facility] [address]  Phone:  [phone]  Fax: [phone]       Chief Complaint   Patient presents with   [facility] Follow Up     Patient admitted to hospitals on 12/25/19 for DKA. .      SUBJECTIVE:    Jeanette Claire is a 67 y.o. female Comes in for return visit having been hospitalized most recently for DKA. Interestingly, three weeks earlier she was hospitalized for the same thing. The patient has absolutely no supervision at home. This is important because she is developing progressive dementia with a significant reduction in short term memory. As a result, she does not check blood sugars, eat at the same time every day or consume the types of foods that are most appropriate, meaning lower processed carbohydrates. She also has a history of CHF secondary to diastolic dysfunction. She has not been in overt failure in several months. During her last hospital stay she was hydrated and developed edema of her lower extremities. She continues to have edema, although denies any symptoms of overt heart failure. She has a past history of primary hypertension, dyslipidemia and hypothyroidism. Her daughter accompanies her and basically says she does not take her insulin on a consistent basis, although she can tell me exactly how much she needs to take. Above and beyond that, she does not eat in a timely fashion as I eluded to earlier. Current Outpatient Medications   Medication Sig Dispense Refill    insulin degludec (TRESIBA FLEXTOUCH U-100) 100 unit/mL (3 mL) inpn 16 Units by SubCUTAneous route daily.       levothyroxine (SYNTHROID) 175 mcg tablet TAKE 1 TABLET BY MOUTH EVERY DAY 90 Tab 3    amLODIPine (NORVASC) 10 mg tablet TAKE 1 TABLET BY MOUTH EVERY DAY IN THE MORNING 90 Tab 3    pravastatin (PRAVACHOL) 80 mg tablet TAKE 1 TABLET BY MOUTH EVERY DAY 90 Tab 3    clopidogrel (PLAVIX) 75 mg tab TAKE 1 TAB BY MOUTH DAILY. 11    metoprolol succinate (TOPROL-XL) 25 mg XL tablet Take 1 Tab by mouth daily. 30 Tab 11    insulin aspart U-100 (NOVOLOG) 100 unit/mL (3 mL) inpn 4 units AC breakfast,lunch, and dinner 1 Adjustable Dose Pre-filled Pen Syringe 11    brimonidine (ALPHAGAN) 0.15 % ophthalmic solution Administer 1 Drop to both eyes two (2) times a day.        Past Medical History:   Diagnosis Date    Diabetes ([facility]     Heart failure ([facility]     unknown to family    Hypercholesteremia     Hypertension     Stroke ([facility]     Thyroid disease      Past Surgical History:   Procedure Laterality Date    HX GYN      HX HEENT      thyroidectomy    HX HYSTERECTOMY      REMOVAL GALLBLADDER      THYROIDECTOMY       No Known Allergies      REVIEW OF SYSTEMS:  General: negative for - chills or fever  ENT: negative for - headaches, nasal congestion or tinnitus  Respiratory: negative for - cough, hemoptysis, shortness of breath or wheezing  Cardiovascular : negative for - chest pain, edema, palpitations or shortness of breath  Gastrointestinal: negative for - abdominal pain, blood in stools, heartburn or nausea/vomiting  Genito-Urinary: no dysuria, trouble voiding, or hematuria  Musculoskeletal: negative for - gait disturbance, joint pain, joint stiffness or joint swelling  Neurological: no TIA or stroke symptoms  Hematologic: no bruises, no bleeding, no swollen glands  Integument: no lumps, mole changes, nail changes or rash  Endocrine: no malaise/lethargy or unexpected weight changes      Social History     Socioeconomic History    Marital status:      Spouse name: Not on file    Number of children: 1    Years of education: Not on file    Highest education level: Not on file   Occupational History    Occupation: retired   Tobacco Use    Smoking status: Never Smoker    Smokeless tobacco: Never Used   Substance and Sexual Activity    Alcohol use: No     Comment: been years    Drug use: No    Sexual activity: Not Currently     Family History   Problem Relation Age of Onset    Diabetes Father     No Known Problems Mother        OBJECTIVE:    Visit Vitals  /61   Pulse 70   Temp 98.2 °F (36.8 °C) (Oral)   Resp 16   Ht 5' 2\" (1.575 m)   Wt 163 lb 12.8 oz (74.3 kg)   SpO2 98%   BMI 29.96 kg/m²     CONSTITUTIONAL: well , well nourished, appears age appropriate  EYES: perrla, eom intact  ENMT:moist mucous membranes, pharynx clear  NECK: supple. Thyroid normal  RESPIRATORY: Chest: clear to ascultation and percussion   CARDIOVASCULAR: Heart: regular rate and rhythm  GASTROINTESTINAL: Abdomen: soft, bowel sounds active  HEMATOLOGIC: no pathological lymph nodes palpated  MUSCULOSKELETAL: Extremities: 1+ edemadistally, pulse 1+   INTEGUMENT: No unusual rashes or suspicious skin lesions noted. Nails appear normal.  NEUROLOGIC: non-focal exam   MENTAL STATUS: alert and oriented, appropriate affect      ASSESSMENT:  1. Type 2 diabetes mellitus with hyperglycemia, with long-term current use of insulin ([address]    2. Hypothyroidism due to medication    3. Edema, unspecified type    4. Diastolic congestive heart failure, unspecified HF chronicity ([address]    5. Essential hypertension        PLAN:    1. The patient's diabetes is poorly controlled. She needs continuous glucose monitoring as I have attempted to obtain previously. She meets all the criteria, but there is a big resistance from her insurance company. I will continue to pursue this. In the intervening time, she needs to have a visiting nurse come by on a daily basis to reduce her repetitive hospitalizations. This will ensure that she takes her insulin appropriately and eat in a timely fashion. Without a meaningful change, specifically supervision, she will continue to have repetitive admissions for DKA. 2. She will continue her thyroid supplement. Her last TSH was excellent. Repeat value will be done today to ensure compliance.   3. She does have about 1+ edema of both lower extremities, but according to the family, patient and daughter, this is really her baseline. In reviewing her weight, she has actually lost 6 pounds since her last visit, so I have agreed. 4. There are no overt signs of CHF today. 5. BP is excellent, no adjustments are made. .  Orders Placed This Encounter    METABOLIC PANEL, BASIC    TSH 3RD GENERATION    NT-PRO BNP    [facility] [address]         Follow-up and Dispositions    · Return in about 3 weeks (around 1/23/2020).            Betty Pino MD

## 2020-01-04 ENCOUNTER — HOME CARE VISIT (OUTPATIENT)
Dept: SCHEDULING | Facility: HOME HEALTH | Age: 80
End: 2020-01-04
Payer: MEDICARE

## 2020-01-04 VITALS
DIASTOLIC BLOOD PRESSURE: 62 MMHG | TEMPERATURE: 98.7 F | RESPIRATION RATE: 20 BRPM | HEART RATE: 72 BPM | SYSTOLIC BLOOD PRESSURE: 134 MMHG | OXYGEN SATURATION: 96 %

## 2020-01-04 PROCEDURE — 400013 HH SOC

## 2020-01-04 PROCEDURE — G0299 HHS/HOSPICE OF RN EA 15 MIN: HCPCS

## 2020-01-04 PROCEDURE — 3331090002 HH PPS REVENUE DEBIT

## 2020-01-04 PROCEDURE — 3331090001 HH PPS REVENUE CREDIT

## 2020-01-05 PROCEDURE — 3331090002 HH PPS REVENUE DEBIT

## 2020-01-05 PROCEDURE — 3331090001 HH PPS REVENUE CREDIT

## 2020-01-06 PROCEDURE — 3331090002 HH PPS REVENUE DEBIT

## 2020-01-06 PROCEDURE — 3331090001 HH PPS REVENUE CREDIT

## 2020-01-07 ENCOUNTER — HOME CARE VISIT (OUTPATIENT)
Dept: SCHEDULING | Facility: HOME HEALTH | Age: 80
End: 2020-01-07
Payer: MEDICARE

## 2020-01-07 ENCOUNTER — PATIENT OUTREACH (OUTPATIENT)
Dept: INTERNAL MEDICINE CLINIC | Age: 80
End: 2020-01-07

## 2020-01-07 PROCEDURE — G0300 HHS/HOSPICE OF LPN EA 15 MIN: HCPCS

## 2020-01-07 PROCEDURE — 3331090001 HH PPS REVENUE CREDIT

## 2020-01-07 PROCEDURE — 3331090002 HH PPS REVENUE DEBIT

## 2020-01-07 NOTE — PROGRESS NOTES
Goals Addressed                 This Visit's Progress     Supportive resources: medicaid assistance and options for caregiver support          1/7/2020  1:00pm  It is noted that patient was recently discharged from the hospital due to DKA. SW contacted patient's daughter, Delfin Maciel, to discuss resources to address patient's needs. Daughter stated that she was busy, but was receptive to taking a few minutes to discuss options related to patient's care. SW explained to daughter the Medicaid process along with the benefits of Medicaid coverage. SW highlighted a few Medicaid services that patient could receive if approved, such as personal care, adult  coverage, assistance with medication cost and alternative placement if needed. Daughter acknowledged the services and stated that her goal is to care for patient in the home. Patient's son live with her and daughter assist with providing care. SW advised daughter to contact [address] [place] [address] to initiate the application process, as it could take up to 45 days for Medicaid approval.  Daughter understood and stated she and patient will complete the application by the end of the week. Daughter denied any additional resources at this time. SW provided daughter my contact information to contact if needed      SW will contact daughter within the next two weeks to assess progress made with completing application, discuss ACP items and offer assistance if needed. 90 [facility] Transitions Team   [address] [place] [address] Ambulatory Care Coordination Team  [phone]     12/17/2019  1:30pm  SW attempted to contact patient's daughter for follow up. SW left a detailed voicemail requesting a call back, and suggested that she leave on my voicemail best time/date to reach her. SW will follow up at a later date. 12/13/2019  1:25pm  Patient's daughter requested assistance with understanding medicaid process and initiating application.   Meena Reyes contacted daughter however, she reported that she was unable to talk and stated she would contact SW [id number] at 1:30pm.      Racquel Beaulieu will attempt contact on 12-16-19 if call is not returned. SW will offer assistance with medicaid process and additional resources if needed.         [address] [facility] Transitions Team

## 2020-01-08 ENCOUNTER — HOME CARE VISIT (OUTPATIENT)
Dept: SCHEDULING | Facility: HOME HEALTH | Age: 80
End: 2020-01-08
Payer: MEDICARE

## 2020-01-08 VITALS — SYSTOLIC BLOOD PRESSURE: 148 MMHG | OXYGEN SATURATION: 96 % | HEART RATE: 76 BPM | DIASTOLIC BLOOD PRESSURE: 69 MMHG

## 2020-01-08 LAB
BUN SERPL-MCNC: 23 MG/DL (ref 8–27)
BUN/CREAT SERPL: 30 (ref 12–28)
CALCIUM SERPL-MCNC: 9.3 MG/DL (ref 8.7–10.3)
CHLORIDE SERPL-SCNC: 100 MMOL/L (ref 96–106)
CO2 SERPL-SCNC: 22 MMOL/L (ref 20–29)
CREAT SERPL-MCNC: 0.76 MG/DL (ref 0.57–1)
GLUCOSE SERPL-MCNC: 188 MG/DL (ref 65–99)
NT-PROBNP SERPL-MCNC: 416 PG/ML (ref 0–738)
POTASSIUM SERPL-SCNC: 5.1 MMOL/L (ref 3.5–5.2)
SODIUM SERPL-SCNC: 139 MMOL/L (ref 134–144)
TSH SERPL DL<=0.005 MIU/L-ACNC: 23.74 UIU/ML (ref 0.45–4.5)

## 2020-01-08 PROCEDURE — G0300 HHS/HOSPICE OF LPN EA 15 MIN: HCPCS

## 2020-01-08 PROCEDURE — 3331090001 HH PPS REVENUE CREDIT

## 2020-01-08 PROCEDURE — 3331090002 HH PPS REVENUE DEBIT

## 2020-01-09 ENCOUNTER — PATIENT OUTREACH (OUTPATIENT)
Dept: INTERNAL MEDICINE CLINIC | Age: 80
End: 2020-01-09

## 2020-01-09 PROCEDURE — 3331090002 HH PPS REVENUE DEBIT

## 2020-01-09 PROCEDURE — 3331090001 HH PPS REVENUE CREDIT

## 2020-01-10 ENCOUNTER — HOME CARE VISIT (OUTPATIENT)
Dept: HOME HEALTH SERVICES | Facility: HOME HEALTH | Age: 80
End: 2020-01-10
Payer: MEDICARE

## 2020-01-10 PROCEDURE — 3331090001 HH PPS REVENUE CREDIT

## 2020-01-10 PROCEDURE — 3331090002 HH PPS REVENUE DEBIT

## 2020-01-10 NOTE — PROGRESS NOTES
Goals Addressed                 This Visit's Progress     Supportive resources: medicaid assistance and options for caregiver support          1/10/2020 3:00pm  MARLEN GUERRERO received a call back from [facility] Renetta GUERRERO. CTSATNAM GUERRERO updated her on the concerns related to patient's care and safety in the home. 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 According to patients daughter, patient owns her home and the son lives with her. Patients daughter is involved with her care but does not live with her. ALMA DELIA GUERRERO made aware of the resources that were provided to daughter regarding the Medicaid process and discussed the possibility of patient participating in [facility] chronic care program for disease management. ALMA DELIA GUERRERO is scheduled to conduct a home visit with patient next week. Due to patients cognition limitations, SW suggested that [facility] SW contact patients son/daughter to schedule a home visit. MARLEN GUERRERO mentioned, pending the outcome of home assessment there may be a need for an APS referral. ALMA DELIA GUERRERO acknowledged the report given and will follow up with MARLEN GUERRERO at a later date to discuss findings. [place] [address] Ambulatory Care Coordination Team  [phone]    1/9/2020 2:30am  [address] [facility], reported that patient is scheduled to receive a home visit from [address] [place] [address] on 1-13-20. MARLEN GUERRERO called home health Renetta GUERRERO requesting a call back to discuss concerns reg patient's care/safety and address social needs. SW will attempt call at a later date if call is not returned.     [place] [address] Ambulatory Care Coordination Team  [phone]    1/7/2020  1:00pm  It is noted that patient was recently discharged from the hospital due to DKA. SW contacted patient's daughter, Shanelle Abraham, to discuss resources to address patient's needs. Daughter stated that she was busy, but was receptive to taking a few minutes to discuss options related to patient's care. SW explained to daughter the Medicaid process along with the benefits of Medicaid coverage. SW highlighted a few Medicaid services that patient could receive if approved, such as personal care, adult  coverage, assistance with medication cost and alternative placement if needed. Daughter acknowledged the services and stated that her goal is to care for patient in the home. Patient's son live with her and daughter assist with providing care. SW advised daughter to contact [address] [place] [address] to initiate the application process, as it could take up to 45 days for Medicaid approval.  Daughter understood and stated she and patient will complete the application by the end of the week. Daughter denied any additional resources at this time. SW provided daughter my contact information to contact if needed      SW will contact daughter within the next two weeks to assess progress made with completing application, discuss ACP items and offer assistance if needed. 90 [facility] Transitions Team   [facility] Ambulatory Care Coordination Team  [phone]     12/17/2019  1:30pm  SW attempted to contact patient's daughter for follow up. SW left a detailed voicemail requesting a call back, and suggested that she leave on my voicemail best time/date to reach her. SW will follow up at a later date. 12/13/2019  1:25pm  Patient's daughter requested assistance with understanding medicaid process and initiating application. SW contacted daughter however, she reported that she was unable to talk and stated she would contact SW [id number] at 1:30pm.      Keke Raya will attempt contact on 12-16-19 if call is not returned.  YOLANDA will offer assistance with medicaid process and additional resources if needed.         [facility] Transitions Team

## 2020-01-11 PROCEDURE — 3331090001 HH PPS REVENUE CREDIT

## 2020-01-11 PROCEDURE — 3331090002 HH PPS REVENUE DEBIT

## 2020-01-12 ENCOUNTER — HOME CARE VISIT (OUTPATIENT)
Dept: HOME HEALTH SERVICES | Facility: HOME HEALTH | Age: 80
End: 2020-01-12
Payer: MEDICARE

## 2020-01-12 PROCEDURE — 3331090001 HH PPS REVENUE CREDIT

## 2020-01-12 PROCEDURE — 3331090002 HH PPS REVENUE DEBIT

## 2020-01-13 ENCOUNTER — HOME CARE VISIT (OUTPATIENT)
Dept: SCHEDULING | Facility: HOME HEALTH | Age: 80
End: 2020-01-13
Payer: MEDICARE

## 2020-01-13 ENCOUNTER — PATIENT OUTREACH (OUTPATIENT)
Dept: INTERNAL MEDICINE CLINIC | Age: 80
End: 2020-01-13

## 2020-01-13 PROCEDURE — G0155 HHCP-SVS OF CSW,EA 15 MIN: HCPCS

## 2020-01-13 PROCEDURE — 3331090002 HH PPS REVENUE DEBIT

## 2020-01-13 PROCEDURE — 3331090001 HH PPS REVENUE CREDIT

## 2020-01-13 PROCEDURE — G0299 HHS/HOSPICE OF RN EA 15 MIN: HCPCS

## 2020-01-14 VITALS
OXYGEN SATURATION: 96 % | SYSTOLIC BLOOD PRESSURE: 140 MMHG | HEART RATE: 78 BPM | DIASTOLIC BLOOD PRESSURE: 88 MMHG | TEMPERATURE: 98.2 F

## 2020-01-14 VITALS
SYSTOLIC BLOOD PRESSURE: 140 MMHG | OXYGEN SATURATION: 97 % | DIASTOLIC BLOOD PRESSURE: 80 MMHG | RESPIRATION RATE: 18 BRPM | TEMPERATURE: 98.6 F | HEART RATE: 70 BPM

## 2020-01-14 PROCEDURE — 3331090001 HH PPS REVENUE CREDIT

## 2020-01-14 PROCEDURE — 3331090002 HH PPS REVENUE DEBIT

## 2020-01-15 ENCOUNTER — HOME CARE VISIT (OUTPATIENT)
Dept: SCHEDULING | Facility: HOME HEALTH | Age: 80
End: 2020-01-15
Payer: MEDICARE

## 2020-01-15 PROCEDURE — 3331090002 HH PPS REVENUE DEBIT

## 2020-01-15 PROCEDURE — 3331090001 HH PPS REVENUE CREDIT

## 2020-01-15 NOTE — PROGRESS NOTES
Goals Addressed                 This Visit's Progress     Supportive resources: medicaid assistance and options for caregiver support   On track       1/15/2020   MARLEN GUERRERO received a call back from Baljeet Bragg providing an update on patients home visit. YOLANDA met with patient in the home and spoke with the daughter via phone during the visit. Raoul Freedman reported there were no concerns observed during the visit. Raoul Freedman reported that patient was alert, verbal and oriented during the visit, as she appropriately shared information and answered questions. However, It is noted that [facility] SN created goals and interventions to address patients cognitive impairment. Raoul Freedman discussed with daughter the importance of having Medicaid coverage in place, as it could provide personal care aides to assist patient in the home. SW explained to daughter the concerns that were shared regarding patients care in the home and offered to schedule a date and time to meet with her to complete a Medicaid application and submit a request for UAI screening, but daughter was not able to confirm a date. ALMA DELIA GUERRERO will follow up with daughter at a later date to schedule meeting. See Baljeet GUERRERO note. [place] [address] Ambulatory Care Coordination Team  [phone]    1/10/2020 3:00pm  MARLEN GUERRERO received a call back from Darin Dominguez. MARLEN GUERRERO updated her on the concerns related to patient's care and safety in the home. 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 According to patients daughter, patient owns her home and the son lives with her. Patients daughter is involved with her care but does not live with her.      ALMA DELIA GUERRERO made aware of the resources that were provided to daughter regarding the Medicaid process and discussed the possibility of patient participating in [facility] chronic care program for disease management. ALMA DELIA GUERRERO is scheduled to conduct a home visit with patient next week. Due to patients cognition limitations, SW suggested that [place] [facility] SW contact patients son/daughter to schedule a home visit. [facility] SW mentioned, pending the outcome of home assessment there may be a need for an APS referral.  SW acknowledged the report given and will follow up with [facility] SW at a later date to discuss findings. [place] [address] Ambulatory Care Coordination Team  [phone]    1/9/2020 2:30am  [facility], reported that patient is scheduled to receive a home visit from Enrique Muñoz on 1-13-20. [facility] SW called home health SW, Radha Russell requesting a call back to discuss concerns reg patient's care/safety and address social needs. SW will attempt call at a later date if call is not returned. [place] [address] Ambulatory Care Coordination Team  [phone]    1/7/2020  1:00pm  It is noted that patient was recently discharged from the hospital due to DKA. SW contacted patient's daughter, Eulalia Abdullahi, to discuss resources to address patient's needs. Daughter stated that she was busy, but was receptive to taking a few minutes to discuss options related to patient's care. SW explained to daughter the Medicaid process along with the benefits of Medicaid coverage. SW highlighted a few Medicaid services that patient could receive if approved, such as personal care, adult  coverage, assistance with medication cost and alternative placement if needed. Daughter acknowledged the services and stated that her goal is to care for patient in the home. Patient's son live with her and daughter assist with providing care.  SW advised daughter to contact [address] [place] [address] to initiate the application process, as it could take up to 45 days for Medicaid approval.  Daughter understood and stated she and patient will complete the application by the end of the week. Daughter denied any additional resources at this time. YOLANDA provided daughter my contact information to contact if needed      SW will contact daughter within the next two weeks to assess progress made with completing application, discuss ACP items and offer assistance if needed. [address] [facility] Team   [address] [place] [address] Ambulatory Care Coordination Team  [phone]     12/17/2019  1:30pm  SW attempted to contact patient's daughter for follow up. SW left a detailed voicemail requesting a call back, and suggested that she leave on my voicemail best time/date to reach her. SW will follow up at a later date. 12/13/2019  1:25pm  Patient's daughter requested assistance with understanding medicaid process and initiating application. SW contacted daughter however, she reported that she was unable to talk and stated she would contact SW [id number] at 1:30pm.      Mone Mason will attempt contact on 12-16-19 if call is not returned. YOLANDA will offer assistance with medicaid process and additional resources if needed.         [address] [facility] Team

## 2020-01-16 ENCOUNTER — OFFICE VISIT (OUTPATIENT)
Dept: INTERNAL MEDICINE CLINIC | Age: 80
End: 2020-01-16

## 2020-01-16 VITALS
WEIGHT: 148.5 LBS | HEART RATE: 73 BPM | DIASTOLIC BLOOD PRESSURE: 69 MMHG | HEIGHT: 62 IN | BODY MASS INDEX: 27.33 KG/M2 | OXYGEN SATURATION: 96 % | RESPIRATION RATE: 16 BRPM | TEMPERATURE: 98.4 F | SYSTOLIC BLOOD PRESSURE: 158 MMHG

## 2020-01-16 DIAGNOSIS — E11.65 TYPE 2 DIABETES MELLITUS WITH HYPERGLYCEMIA, WITH LONG-TERM CURRENT USE OF INSULIN (HCC): Primary | ICD-10-CM

## 2020-01-16 DIAGNOSIS — I87.2 VENOUS INSUFFICIENCY: ICD-10-CM

## 2020-01-16 DIAGNOSIS — F01.50 VASCULAR DEMENTIA WITHOUT BEHAVIORAL DISTURBANCE (HCC): ICD-10-CM

## 2020-01-16 DIAGNOSIS — E78.5 DYSLIPIDEMIA: ICD-10-CM

## 2020-01-16 DIAGNOSIS — I10 ESSENTIAL HYPERTENSION: ICD-10-CM

## 2020-01-16 DIAGNOSIS — E03.2 HYPOTHYROIDISM DUE TO MEDICATION: ICD-10-CM

## 2020-01-16 DIAGNOSIS — Z79.4 TYPE 2 DIABETES MELLITUS WITH HYPERGLYCEMIA, WITH LONG-TERM CURRENT USE OF INSULIN (HCC): Primary | ICD-10-CM

## 2020-01-16 PROBLEM — D72.829 LEUKOCYTOSIS: Status: RESOLVED | Noted: 2019-12-04 | Resolved: 2020-01-16

## 2020-01-16 PROBLEM — A41.9 SEPTIC SHOCK (HCC): Status: RESOLVED | Noted: 2019-12-04 | Resolved: 2020-01-16

## 2020-01-16 PROBLEM — E87.20 LACTIC ACIDOSIS: Status: RESOLVED | Noted: 2019-06-10 | Resolved: 2020-01-16

## 2020-01-16 PROBLEM — N17.9 AKI (ACUTE KIDNEY INJURY) (HCC): Status: RESOLVED | Noted: 2019-11-19 | Resolved: 2020-01-16

## 2020-01-16 PROBLEM — L03.114 LEFT ARM CELLULITIS: Status: RESOLVED | Noted: 2019-12-04 | Resolved: 2020-01-16

## 2020-01-16 PROBLEM — R65.21 SEPTIC SHOCK (HCC): Status: RESOLVED | Noted: 2019-12-04 | Resolved: 2020-01-16

## 2020-01-16 PROCEDURE — 3331090001 HH PPS REVENUE CREDIT

## 2020-01-16 PROCEDURE — 3331090002 HH PPS REVENUE DEBIT

## 2020-01-16 RX ORDER — CLOPIDOGREL BISULFATE 75 MG/1
TABLET ORAL
Qty: 90 TAB | Refills: 3 | Status: SHIPPED | OUTPATIENT
Start: 2020-01-16 | End: 2020-02-27 | Stop reason: SDUPTHER

## 2020-01-16 NOTE — PROGRESS NOTES
Chief Complaint   Patient presents with    Diabetes     2 week follow up      1. Have you been to the ER, urgent care clinic since your last visit? Hospitalized since your last visit? No    2. Have you seen or consulted any other health care providers outside of the [address] [place] [address] since your last visit? Include any pap smears or colon screening.  No

## 2020-01-17 ENCOUNTER — HOME CARE VISIT (OUTPATIENT)
Dept: SCHEDULING | Facility: HOME HEALTH | Age: 80
End: 2020-01-17
Payer: MEDICARE

## 2020-01-17 PROCEDURE — 3331090002 HH PPS REVENUE DEBIT

## 2020-01-17 PROCEDURE — G0300 HHS/HOSPICE OF LPN EA 15 MIN: HCPCS

## 2020-01-17 PROCEDURE — 3331090001 HH PPS REVENUE CREDIT

## 2020-01-17 NOTE — PROGRESS NOTES
[facility]  [place] [address]  [address] [place] [address]  Phone:  [phone]  Fax: [phone]       Chief Complaint   Patient presents with    Diabetes     2 week follow up    . SUBJECTIVE:    Cailin Robbins is a 67 y.o. female Comes in for return visit accompanied by her daughter. Her memory is getting progressively worse. When I ask about blood sugars, she could give me no meaningful numbers. She literally cannot remember. She does not check blood sugars on a consistent basis, citing that her glucometer does not work well. We are in the process of attempting to get her continue glucose monitoring with a Mayra device. It has been officially approved by [facility] and hopefully it is just a matter of time before this can be obtained because it will facilitate things significantly and reduce the likelihood of her developing ketoacidosis. She is not taking her thyroid medication on a regular basis. She does not remember how I told her to take it, specifically on am empty stomach. She has very little edema today also. She has a past history of primary hypertension and dyslipidemia. Unfortunately, her dementia is getting progressively worse. Her assistance at home is markedly limited. Current Outpatient Medications   Medication Sig Dispense Refill    clopidogreL (PLAVIX) 75 mg tab TAKE 1 TABLET BY MOUTH EVERY DAY 90 Tab 3    insulin degludec (TRESIBA FLEXTOUCH U-100) 100 unit/mL (3 mL) inpn 16 Units by SubCUTAneous route daily.  levothyroxine (SYNTHROID) 175 mcg tablet TAKE 1 TABLET BY MOUTH EVERY DAY 90 Tab 3    amLODIPine (NORVASC) 10 mg tablet TAKE 1 TABLET BY MOUTH EVERY DAY IN THE MORNING 90 Tab 3    pravastatin (PRAVACHOL) 80 mg tablet TAKE 1 TABLET BY MOUTH EVERY DAY 90 Tab 3    metoprolol succinate (TOPROL-XL) 25 mg XL tablet Take 1 Tab by mouth daily.  30 Tab 11    insulin aspart U-100 (NOVOLOG) 100 unit/mL (3 mL) inpn 4 units AC breakfast,lunch, and dinner (Patient taking differently: 4 Units by SubCUTAneous route Before breakfast, lunch, and dinner. 4 units AC breakfast,lunch, and dinner) 1 Adjustable Dose Pre-filled Pen Syringe 11    brimonidine (ALPHAGAN) 0.15 % ophthalmic solution Administer 1 Drop to both eyes two (2) times a day.        Past Medical History:   Diagnosis Date    Diabetes [facility]     Heart failure ([facility]     unknown to family    Hypercholesteremia     Hypertension     [facility]     Thyroid disease      Past Surgical History:   Procedure Laterality Date    HX GYN      HX HEENT      thyroidectomy    HX HYSTERECTOMY      REMOVAL GALLBLADDER      THYROIDECTOMY       No Known Allergies      REVIEW OF SYSTEMS:  General: negative for - chills or fever  ENT: negative for - headaches, nasal congestion or tinnitus  Respiratory: negative for - cough, hemoptysis, shortness of breath or wheezing  Cardiovascular : negative for - chest pain, edema, palpitations or shortness of breath  Gastrointestinal: negative for - abdominal pain, blood in stools, heartburn or nausea/vomiting  Genito-Urinary: no dysuria, trouble voiding, or hematuria  Musculoskeletal: negative for - gait disturbance, joint pain, joint stiffness or joint swelling  Neurological: no TIA or stroke symptoms  Hematologic: no bruises, no bleeding, no swollen glands  Integument: no lumps, mole changes, nail changes or rash  Endocrine: no malaise/lethargy or unexpected weight changes      Social History     Socioeconomic History    Marital status:      Spouse name: Not on file    Number of children: 1    Years of education: Not on file    Highest education level: Not on file   Occupational History    Occupation: retired   Tobacco Use    Smoking status: Never Smoker    Smokeless tobacco: Never Used   Substance and Sexual Activity    Alcohol use: No     Comment: been years    Drug use: No    Sexual activity: Not Currently     Family History Problem Relation Age of Onset    Diabetes Father     No Known Problems Mother        OBJECTIVE:    Visit Vitals  /69   Pulse 73   Temp 98.4 °F (36.9 °C) (Oral)   Resp 16   Ht 5' 2\" (1.575 m)   Wt 148 lb 8 oz (67.4 kg)   SpO2 96%   BMI 27.16 kg/m²     CONSTITUTIONAL: well , well nourished, appears age appropriate  EYES: perrla, eom intact  ENMT:moist mucous membranes, pharynx clear  NECK: supple. Thyroid normal  RESPIRATORY: Chest: clear to ascultation and percussion   CARDIOVASCULAR: Heart: regular rate and rhythm  GASTROINTESTINAL: Abdomen: soft, bowel sounds active  HEMATOLOGIC: no pathological lymph nodes palpated  MUSCULOSKELETAL: Extremities: trace edema distally, pulse 1+   INTEGUMENT: No unusual rashes or suspicious skin lesions noted. Nails appear normal.  NEUROLOGIC: non-focal exam   MENTAL STATUS: alert and oriented, appropriate affect      ASSESSMENT:  1. Type 2 diabetes mellitus with hyperglycemia, with long-term current use of insulin ([address]    2. Dyslipidemia    3. Vascular dementia without behavioral disturbance ([address]    4. Hypothyroidism due to medication    5. Essential hypertension    6. Venous insufficiency        PLAN:    1. Hopefully she can get a Dickey device. Without meaningful information, nothing can be done as far as her diabetes is concerned. I remind the daughter that the patient has to eat at the same time every day. This reduces the likelihood of hypoglycemia and glycemic variability. 2. She will continue statin as prescribed. She has increased cardiovascular risk. She is in a primary risk prevention mode currently. 3. Her dementia is gradually getting worse. This means additional assistance at home is mandatory. 4. Patient is told to take her thyroid medication on an empty stomach in the morning and she cannot eat anything for at least 45 minutes to an hour after taking the medication. The dose will not be adjusted.   5. BP is excellent today, no adjustments are made.  6. The venous insufficiency in her lower extremities has improved significantly. There has been a significant improvement as compared to her last visit. This swelling historically does not represent CHF. Follow-up and Dispositions    · Return in about 3 weeks (around 2/6/2020).            Alicia Mitchell MD

## 2020-01-18 PROCEDURE — 3331090001 HH PPS REVENUE CREDIT

## 2020-01-18 PROCEDURE — 3331090002 HH PPS REVENUE DEBIT

## 2020-01-19 PROCEDURE — 3331090001 HH PPS REVENUE CREDIT

## 2020-01-19 PROCEDURE — 3331090002 HH PPS REVENUE DEBIT

## 2020-01-20 VITALS
OXYGEN SATURATION: 98 % | HEART RATE: 68 BPM | TEMPERATURE: 97.9 F | DIASTOLIC BLOOD PRESSURE: 58 MMHG | SYSTOLIC BLOOD PRESSURE: 140 MMHG

## 2020-01-20 PROCEDURE — 3331090001 HH PPS REVENUE CREDIT

## 2020-01-20 PROCEDURE — 3331090002 HH PPS REVENUE DEBIT

## 2020-01-21 PROCEDURE — 3331090001 HH PPS REVENUE CREDIT

## 2020-01-21 PROCEDURE — 3331090002 HH PPS REVENUE DEBIT

## 2020-01-22 ENCOUNTER — HOME CARE VISIT (OUTPATIENT)
Dept: SCHEDULING | Facility: HOME HEALTH | Age: 80
End: 2020-01-22
Payer: MEDICARE

## 2020-01-22 PROCEDURE — 3331090002 HH PPS REVENUE DEBIT

## 2020-01-22 PROCEDURE — 3331090001 HH PPS REVENUE CREDIT

## 2020-01-22 PROCEDURE — G0299 HHS/HOSPICE OF RN EA 15 MIN: HCPCS

## 2020-01-23 VITALS
DIASTOLIC BLOOD PRESSURE: 66 MMHG | RESPIRATION RATE: 18 BRPM | OXYGEN SATURATION: 98 % | HEART RATE: 72 BPM | TEMPERATURE: 98.6 F | SYSTOLIC BLOOD PRESSURE: 136 MMHG

## 2020-01-23 PROCEDURE — 3331090001 HH PPS REVENUE CREDIT

## 2020-01-23 PROCEDURE — 3331090002 HH PPS REVENUE DEBIT

## 2020-01-24 ENCOUNTER — HOME CARE VISIT (OUTPATIENT)
Dept: HOME HEALTH SERVICES | Facility: HOME HEALTH | Age: 80
End: 2020-01-24
Payer: MEDICARE

## 2020-01-24 PROCEDURE — 3331090001 HH PPS REVENUE CREDIT

## 2020-01-24 PROCEDURE — 3331090002 HH PPS REVENUE DEBIT

## 2020-01-25 ENCOUNTER — HOSPITAL ENCOUNTER (EMERGENCY)
Age: 80
Discharge: HOME | End: 2020-01-25
Attending: EMERGENCY MEDICINE
Payer: MEDICARE

## 2020-01-25 VITALS
OXYGEN SATURATION: 100 % | HEART RATE: 81 BPM | DIASTOLIC BLOOD PRESSURE: 53 MMHG | TEMPERATURE: 97.8 F | SYSTOLIC BLOOD PRESSURE: 145 MMHG | RESPIRATION RATE: 15 BRPM

## 2020-01-25 DIAGNOSIS — E16.2 HYPOGLYCEMIA: Primary | ICD-10-CM

## 2020-01-25 LAB
ALBUMIN SERPL-MCNC: 3.3 G/DL (ref 3.5–5)
ALBUMIN/GLOB SERPL: 1 {RATIO} (ref 1.1–2.2)
ALP SERPL-CCNC: 96 U/L (ref 45–117)
ALT SERPL-CCNC: 34 U/L (ref 12–78)
ANION GAP SERPL CALC-SCNC: 6 MMOL/L (ref 5–15)
AST SERPL-CCNC: 38 U/L (ref 15–37)
BASOPHILS # BLD: 0 K/UL (ref 0–0.1)
BASOPHILS NFR BLD: 1 % (ref 0–1)
BILIRUB SERPL-MCNC: 0.2 MG/DL (ref 0.2–1)
BUN SERPL-MCNC: 14 MG/DL (ref 6–20)
BUN/CREAT SERPL: 22 (ref 12–20)
CALCIUM SERPL-MCNC: 8.6 MG/DL (ref 8.5–10.1)
CHLORIDE SERPL-SCNC: 109 MMOL/L (ref 97–108)
CO2 SERPL-SCNC: 27 MMOL/L (ref 21–32)
CREAT SERPL-MCNC: 0.65 MG/DL (ref 0.55–1.02)
DIFFERENTIAL METHOD BLD: ABNORMAL
EOSINOPHIL # BLD: 0.1 K/UL (ref 0–0.4)
EOSINOPHIL NFR BLD: 5 % (ref 0–7)
ERYTHROCYTE [DISTWIDTH] IN BLOOD BY AUTOMATED COUNT: 13.3 % (ref 11.5–14.5)
GLOBULIN SER CALC-MCNC: 3.4 G/DL (ref 2–4)
GLUCOSE BLD STRIP.AUTO-MCNC: 193 MG/DL (ref 65–100)
GLUCOSE BLD STRIP.AUTO-MCNC: 251 MG/DL (ref 65–100)
GLUCOSE SERPL-MCNC: 113 MG/DL (ref 65–100)
HCT VFR BLD AUTO: 37.6 % (ref 35–47)
HGB BLD-MCNC: 12.7 G/DL (ref 11.5–16)
IMM GRANULOCYTES # BLD AUTO: 0 K/UL (ref 0–0.04)
IMM GRANULOCYTES NFR BLD AUTO: 0 % (ref 0–0.5)
LYMPHOCYTES # BLD: 1 K/UL (ref 0.8–3.5)
LYMPHOCYTES NFR BLD: 39 % (ref 12–49)
MCH RBC QN AUTO: 32.4 PG (ref 26–34)
MCHC RBC AUTO-ENTMCNC: 33.8 G/DL (ref 30–36.5)
MCV RBC AUTO: 95.9 FL (ref 80–99)
MONOCYTES # BLD: 0.2 K/UL (ref 0–1)
MONOCYTES NFR BLD: 8 % (ref 5–13)
NEUTS SEG # BLD: 1.2 K/UL (ref 1.8–8)
NEUTS SEG NFR BLD: 47 % (ref 32–75)
NRBC # BLD: 0 K/UL (ref 0–0.01)
NRBC BLD-RTO: 0 PER 100 WBC
PLATELET # BLD AUTO: 168 K/UL (ref 150–400)
PMV BLD AUTO: 10.5 FL (ref 8.9–12.9)
POTASSIUM SERPL-SCNC: 3.3 MMOL/L (ref 3.5–5.1)
PROT SERPL-MCNC: 6.7 G/DL (ref 6.4–8.2)
RBC # BLD AUTO: 3.92 M/UL (ref 3.8–5.2)
RBC MORPH BLD: ABNORMAL
SERVICE CMNT-IMP: ABNORMAL
SERVICE CMNT-IMP: ABNORMAL
SODIUM SERPL-SCNC: 142 MMOL/L (ref 136–145)
WBC # BLD AUTO: 2.5 K/UL (ref 3.6–11)

## 2020-01-25 PROCEDURE — 3331090001 HH PPS REVENUE CREDIT

## 2020-01-25 PROCEDURE — 80053 COMPREHEN METABOLIC PANEL: CPT

## 2020-01-25 PROCEDURE — 74011250637 HC RX REV CODE- 250/637: Performed by: EMERGENCY MEDICINE

## 2020-01-25 PROCEDURE — 36415 COLL VENOUS BLD VENIPUNCTURE: CPT

## 2020-01-25 PROCEDURE — 3331090002 HH PPS REVENUE DEBIT

## 2020-01-25 PROCEDURE — 82962 GLUCOSE BLOOD TEST: CPT

## 2020-01-25 PROCEDURE — 99285 EMERGENCY DEPT VISIT HI MDM: CPT

## 2020-01-25 PROCEDURE — 85025 COMPLETE CBC W/AUTO DIFF WBC: CPT

## 2020-01-25 RX ORDER — ACETAMINOPHEN 500 MG
1000 TABLET ORAL ONCE
Status: COMPLETED | OUTPATIENT
Start: 2020-01-25 | End: 2020-01-25

## 2020-01-25 RX ADMIN — ACETAMINOPHEN 1000 MG: 500 TABLET ORAL at 10:44

## 2020-01-25 NOTE — ED TRIAGE NOTES
Patient arrives to the emergency department via EMS with a chief complaint of hypoglycemia. When EMS arrived pts BG was 30, they gave 2 doses of oral glucose and 1mg IV of glucagon due to being unable to obtain IV. Is alert and oriented but is unable to recall if she took her insulin or ate this morning.  now.

## 2020-01-25 NOTE — ED PROVIDER NOTES
EMERGENCY DEPARTMENT HISTORY AND PHYSICAL EXAM      Date: 1/25/2020  Patient Name: Nati Walls  Patient Age and Sex: 67 y.o. female     History of Presenting Illness     Chief Complaint   Patient presents with    Low Blood Sugar     BG when EMS arrived gave 2 doses of oral glucose and 1mg IM glucagon BG is currently 193       History Provided By: Patient    HPI: Nati Walls  Is a 16-year-old female with past medical history of diabetes presenting today with hyperglycemia. The patient reports that she took her insulin as she is prescribed. She states that she typically tries to eat small snacks throughout the evening and the night to avoid hypoglycemia. She states that her son called the ambulance because she was not acting normal.  The paramedics gave her glucagon, and also gave her 2 doses of oral glucose. The patient states that she feels well now. She complains of a mild headache. Eating crackers in the room. The patient denies any recent illnesses and has been feeling well for the past several days. There are no other complaints, changes, or physical findings at this time. PCP: Jeffrey Goldsmith MD    No current facility-administered medications on file prior to encounter. Current Outpatient Medications on File Prior to Encounter   Medication Sig Dispense Refill    clopidogreL (PLAVIX) 75 mg tab TAKE 1 TABLET BY MOUTH EVERY DAY 90 Tab 3    insulin degludec (TRESIBA FLEXTOUCH U-100) 100 unit/mL (3 mL) inpn 16 Units by SubCUTAneous route daily.  levothyroxine (SYNTHROID) 175 mcg tablet TAKE 1 TABLET BY MOUTH EVERY DAY 90 Tab 3    amLODIPine (NORVASC) 10 mg tablet TAKE 1 TABLET BY MOUTH EVERY DAY IN THE MORNING 90 Tab 3    pravastatin (PRAVACHOL) 80 mg tablet TAKE 1 TABLET BY MOUTH EVERY DAY 90 Tab 3    metoprolol succinate (TOPROL-XL) 25 mg XL tablet Take 1 Tab by mouth daily.  30 Tab 11    insulin aspart U-100 (NOVOLOG) 100 unit/mL (3 mL) inpn 4 units AC breakfast,lunch, and dinner (Patient taking differently: 4 Units by SubCUTAneous route Before breakfast, lunch, and dinner. 4 units AC breakfast,lunch, and dinner) 1 Adjustable Dose Pre-filled Pen Syringe 11    brimonidine (ALPHAGAN) 0.15 % ophthalmic solution Administer 1 Drop to both eyes two (2) times a day. Past History     Past Medical History:  Past Medical History:   Diagnosis Date    [facility]     Heart failure [facility]     unknown to family    Hypercholesteremia     Hypertension     Stroke [facility]     Thyroid disease        Past Surgical History:  Past Surgical History:   Procedure Laterality Date    HX GYN      HX HEENT      thyroidectomy    HX HYSTERECTOMY      REMOVAL GALLBLADDER      THYROIDECTOMY         Family History:  Family History   Problem Relation Age of Onset    Diabetes Father     No Known Problems Mother        Social History:  Social History     Tobacco Use    Smoking status: Never Smoker    Smokeless tobacco: Never Used   Substance Use Topics    Alcohol use: No     Comment: been years    Drug use: No       Allergies:  No Known Allergies      Review of Systems   Constitutional: No  fever,  +  headache  Skin: No  rash, No  jaundice  HEENT: No  nasal congestion, No  eye drainage. Resp: No cough,  No  wheezing  CV: No chest pain, No  palpitations  GI: No vomiting,  No  diarrhea.,  No  constipation  : No dysuria,  No  hematuria  MSK: No joint pain,  No  trauma  Neuro: No numbness, No  tingling  Psych: No suicidal, No  paranoid      Physical Exam     Patient Vitals for the past 12 hrs:   Temp Pulse Resp BP SpO2   01/25/20 1200    145/53 100 %   01/25/20 1130    155/53 100 %   01/25/20 1100    158/83 100 %   01/25/20 1026 97.8 °F (36.6 °C) 81 15 149/59 100 %     General: alert, No acute distress  Eyes: EOMI, normal conjunctiva  ENT: moist mucous membranes. Neck: Active, full ROM of neck. Skin: No rashes. no jaundice              Lungs: Equal chest expansion. no respiratory distress. clear to auscultation bilaterally No accessory muscle usage  Heart: regular rate     no peripheral edema   2+ radial pulses and DPs bilaterally  Abd:  non distended soft, nontender. No rebound tenderness. No guarding  Back: Full ROM  MSK: Full, active ROM in all 4 extremities. Neuro: Person, Place, Time and Situation; normal speech;   Psych: Cooperative with exam; Appropriate mood and affect             Diagnostic Study Results     Labs -     Recent Results (from the past 12 hour(s))   GLUCOSE, POC    Collection Time: 01/25/20 10:20 AM   Result Value Ref Range    Glucose (POC) 193 (H) 65 - 100 mg/dL    Performed by Filomena Cuevas    CBC WITH AUTOMATED DIFF    Collection Time: 01/25/20 10:33 AM   Result Value Ref Range    WBC 2.5 (L) 3.6 - 11.0 K/uL    RBC 3.92 3.80 - 5.20 M/uL    HGB 12.7 11.5 - 16.0 g/dL    HCT 37.6 35.0 - 47.0 %    MCV 95.9 80.0 - 99.0 FL    MCH 32.4 26.0 - 34.0 PG    MCHC 33.8 30.0 - 36.5 g/dL    RDW 13.3 11.5 - 14.5 %    PLATELET 899 540 - 056 K/uL    MPV 10.5 8.9 - 12.9 FL    NRBC 0.0 0  WBC    ABSOLUTE NRBC 0.00 0.00 - 0.01 K/uL    NEUTROPHILS 47 32 - 75 %    LYMPHOCYTES 39 12 - 49 %    MONOCYTES 8 5 - 13 %    EOSINOPHILS 5 0 - 7 %    BASOPHILS 1 0 - 1 %    IMMATURE GRANULOCYTES 0 0.0 - 0.5 %    ABS. NEUTROPHILS 1.2 (L) 1.8 - 8.0 K/UL    ABS. LYMPHOCYTES 1.0 0.8 - 3.5 K/UL    ABS. MONOCYTES 0.2 0.0 - 1.0 K/UL    ABS. EOSINOPHILS 0.1 0.0 - 0.4 K/UL    ABS. BASOPHILS 0.0 0.0 - 0.1 K/UL    ABS. IMM.  GRANS. 0.0 0.00 - 0.04 K/UL    DF MANUAL      RBC COMMENTS ANISOCYTOSIS  PRESENT       METABOLIC PANEL, COMPREHENSIVE    Collection Time: 01/25/20 10:33 AM   Result Value Ref Range    Sodium 142 136 - 145 mmol/L    Potassium 3.3 (L) 3.5 - 5.1 mmol/L    Chloride 109 (H) 97 - 108 mmol/L    CO2 27 21 - 32 mmol/L    Anion gap 6 5 - 15 mmol/L    Glucose 113 (H) 65 - 100 mg/dL    BUN 14 6 - 20 MG/DL    Creatinine 0.65 0.55 - 1.02 MG/DL    BUN/Creatinine ratio 22 (H) 12 - 20      GFR est AA >60 >60 ml/min/1.73m2    GFR est non-AA >60 >60 ml/min/1.73m2    Calcium 8.6 8.5 - 10.1 MG/DL    Bilirubin, total 0.2 0.2 - 1.0 MG/DL    ALT (SGPT) 34 12 - 78 U/L    AST (SGOT) 38 (H) 15 - 37 U/L    Alk. phosphatase 96 45 - 117 U/L    Protein, total 6.7 6.4 - 8.2 g/dL    Albumin 3.3 (L) 3.5 - 5.0 g/dL    Globulin 3.4 2.0 - 4.0 g/dL    A-G Ratio 1.0 (L) 1.1 - 2.2     GLUCOSE, POC    Collection Time: 01/25/20 11:31 AM   Result Value Ref Range    Glucose (POC) 251 (H) 65 - 100 mg/dL    Performed by Cindy Hines        Radiologic Studies -   No orders to display     CT Results  (Last 48 hours)    None        CXR Results  (Last 48 hours)    None            Medical Decision Making     Differential Diagnosis: Hypoglycemia, electrolyte derangement, medication effect    I reviewed the vital signs, available nursing notes, past medical history, past surgical history, family history and social history and old medical records. On my interpretation, Laboratory workup is significant for initial glucose check is 251, subsequent after eating 193, otherwise unremarkable electrolytes and CBC    Management/ED course: Patient presents today with hyperglycemia. She is well-appearing, has no complaints. Had no symptoms over the past several days. She has had issues with her glucose control likely because her special needs son is the one controlling her medications. The patient had appropriate response after eating here in the emergency department he did not have any further episodes of hypoglycemia and is discharged to follow-up. Dispo: Discharged. The patient has been re-evaluated and is ready for discharge. Reviewed available results with patient. Counseled patient on diagnosis and care plan. Patient has expressed understanding, and all questions have been answered. Patient agrees with plan and agrees to follow up as recommended, or to return to the ED if their symptoms worsen.  Discharge instructions have been provided and explained to the patient, along with reasons to return to the ED. PLAN:  Discharge Medication List as of 1/25/2020 11:36 AM        2. Follow-up Information     Follow up With Specialties Details Why Contact Info    Sridevi Zhao MD Internal Medicine  As needed [place] [address]  [facility] 7 [phone]          3. Return to ED if worse     Diagnosis     Clinical Impression:   1.  Hypoglycemia        Attestations:    Martir Painter MD

## 2020-01-25 NOTE — ED NOTES
Dr. Aliza Edouard and Micah López RN reviewed discharge instructions with the patient. The patient verbalized understanding. All questions and concerns were addressed. The patient was taken out of the department in a wheelchair and is discharged ambulatory in the care of family members with instructions and prescriptions in hand. Pt is alert and oriented x 4. Respirations are clear and unlabored.

## 2020-01-26 PROCEDURE — 3331090001 HH PPS REVENUE CREDIT

## 2020-01-26 PROCEDURE — 3331090002 HH PPS REVENUE DEBIT

## 2020-01-27 PROCEDURE — 3331090002 HH PPS REVENUE DEBIT

## 2020-01-27 PROCEDURE — 3331090001 HH PPS REVENUE CREDIT

## 2020-01-28 ENCOUNTER — HOME CARE VISIT (OUTPATIENT)
Dept: SCHEDULING | Facility: HOME HEALTH | Age: 80
End: 2020-01-28
Payer: MEDICARE

## 2020-01-28 VITALS
TEMPERATURE: 97.7 F | DIASTOLIC BLOOD PRESSURE: 78 MMHG | SYSTOLIC BLOOD PRESSURE: 170 MMHG | RESPIRATION RATE: 18 BRPM | OXYGEN SATURATION: 99 % | HEART RATE: 60 BPM

## 2020-01-28 PROCEDURE — G0299 HHS/HOSPICE OF RN EA 15 MIN: HCPCS

## 2020-01-28 PROCEDURE — 3331090002 HH PPS REVENUE DEBIT

## 2020-01-28 PROCEDURE — 3331090001 HH PPS REVENUE CREDIT

## 2020-01-29 PROCEDURE — 3331090002 HH PPS REVENUE DEBIT

## 2020-01-29 PROCEDURE — 3331090001 HH PPS REVENUE CREDIT

## 2020-01-30 PROCEDURE — 3331090002 HH PPS REVENUE DEBIT

## 2020-01-30 PROCEDURE — 3331090001 HH PPS REVENUE CREDIT

## 2020-01-31 ENCOUNTER — HOME CARE VISIT (OUTPATIENT)
Dept: SCHEDULING | Facility: HOME HEALTH | Age: 80
End: 2020-01-31
Payer: MEDICARE

## 2020-01-31 ENCOUNTER — PATIENT OUTREACH (OUTPATIENT)
Dept: INTERNAL MEDICINE CLINIC | Age: 80
End: 2020-01-31

## 2020-01-31 PROCEDURE — 3331090002 HH PPS REVENUE DEBIT

## 2020-01-31 PROCEDURE — 3331090001 HH PPS REVENUE CREDIT

## 2020-01-31 PROCEDURE — G0300 HHS/HOSPICE OF LPN EA 15 MIN: HCPCS

## 2020-02-01 PROCEDURE — 3331090002 HH PPS REVENUE DEBIT

## 2020-02-01 PROCEDURE — 3331090001 HH PPS REVENUE CREDIT

## 2020-02-02 PROCEDURE — 3331090001 HH PPS REVENUE CREDIT

## 2020-02-02 PROCEDURE — 3331090002 HH PPS REVENUE DEBIT

## 2020-02-03 PROCEDURE — 3331090001 HH PPS REVENUE CREDIT

## 2020-02-03 PROCEDURE — 3331090002 HH PPS REVENUE DEBIT

## 2020-02-04 ENCOUNTER — HOME CARE VISIT (OUTPATIENT)
Dept: SCHEDULING | Facility: HOME HEALTH | Age: 80
End: 2020-02-04
Payer: MEDICARE

## 2020-02-04 VITALS
HEART RATE: 66 BPM | SYSTOLIC BLOOD PRESSURE: 180 MMHG | TEMPERATURE: 98.4 F | DIASTOLIC BLOOD PRESSURE: 78 MMHG | OXYGEN SATURATION: 98 %

## 2020-02-04 PROCEDURE — 400013 HH SOC

## 2020-02-04 PROCEDURE — 3331090002 HH PPS REVENUE DEBIT

## 2020-02-04 PROCEDURE — 3331090001 HH PPS REVENUE CREDIT

## 2020-02-04 PROCEDURE — G0299 HHS/HOSPICE OF RN EA 15 MIN: HCPCS

## 2020-02-05 PROCEDURE — 3331090001 HH PPS REVENUE CREDIT

## 2020-02-05 PROCEDURE — 3331090002 HH PPS REVENUE DEBIT

## 2020-02-06 ENCOUNTER — HOME CARE VISIT (OUTPATIENT)
Dept: HOME HEALTH SERVICES | Facility: HOME HEALTH | Age: 80
End: 2020-02-06
Payer: MEDICARE

## 2020-02-06 PROCEDURE — 3331090001 HH PPS REVENUE CREDIT

## 2020-02-06 PROCEDURE — 3331090002 HH PPS REVENUE DEBIT

## 2020-02-07 ENCOUNTER — HOME CARE VISIT (OUTPATIENT)
Dept: SCHEDULING | Facility: HOME HEALTH | Age: 80
End: 2020-02-07
Payer: MEDICARE

## 2020-02-07 PROCEDURE — 3331090001 HH PPS REVENUE CREDIT

## 2020-02-07 PROCEDURE — 400013 HH SOC

## 2020-02-07 PROCEDURE — G0299 HHS/HOSPICE OF RN EA 15 MIN: HCPCS

## 2020-02-07 PROCEDURE — 3331090002 HH PPS REVENUE DEBIT

## 2020-02-08 VITALS
OXYGEN SATURATION: 98 % | SYSTOLIC BLOOD PRESSURE: 150 MMHG | HEART RATE: 72 BPM | DIASTOLIC BLOOD PRESSURE: 64 MMHG | TEMPERATURE: 98 F | RESPIRATION RATE: 18 BRPM

## 2020-02-08 PROCEDURE — 3331090002 HH PPS REVENUE DEBIT

## 2020-02-08 PROCEDURE — 3331090001 HH PPS REVENUE CREDIT

## 2020-02-09 PROCEDURE — 3331090002 HH PPS REVENUE DEBIT

## 2020-02-09 PROCEDURE — 3331090001 HH PPS REVENUE CREDIT

## 2020-02-10 VITALS
SYSTOLIC BLOOD PRESSURE: 150 MMHG | TEMPERATURE: 98.8 F | HEART RATE: 72 BPM | OXYGEN SATURATION: 97 % | DIASTOLIC BLOOD PRESSURE: 70 MMHG | RESPIRATION RATE: 18 BRPM

## 2020-02-10 PROCEDURE — 3331090002 HH PPS REVENUE DEBIT

## 2020-02-10 PROCEDURE — 3331090001 HH PPS REVENUE CREDIT

## 2020-02-11 ENCOUNTER — HOME CARE VISIT (OUTPATIENT)
Dept: SCHEDULING | Facility: HOME HEALTH | Age: 80
End: 2020-02-11
Payer: MEDICARE

## 2020-02-11 ENCOUNTER — HOME CARE VISIT (OUTPATIENT)
Dept: HOME HEALTH SERVICES | Facility: HOME HEALTH | Age: 80
End: 2020-02-11
Payer: MEDICARE

## 2020-02-11 VITALS
RESPIRATION RATE: 18 BRPM | HEART RATE: 78 BPM | OXYGEN SATURATION: 98 % | SYSTOLIC BLOOD PRESSURE: 160 MMHG | TEMPERATURE: 97.9 F | DIASTOLIC BLOOD PRESSURE: 78 MMHG

## 2020-02-11 PROCEDURE — 3331090001 HH PPS REVENUE CREDIT

## 2020-02-11 PROCEDURE — G0299 HHS/HOSPICE OF RN EA 15 MIN: HCPCS

## 2020-02-11 PROCEDURE — 3331090002 HH PPS REVENUE DEBIT

## 2020-02-12 PROCEDURE — 3331090002 HH PPS REVENUE DEBIT

## 2020-02-12 PROCEDURE — 3331090001 HH PPS REVENUE CREDIT

## 2020-02-13 PROCEDURE — 3331090002 HH PPS REVENUE DEBIT

## 2020-02-13 PROCEDURE — 3331090001 HH PPS REVENUE CREDIT

## 2020-02-14 PROCEDURE — 3331090001 HH PPS REVENUE CREDIT

## 2020-02-14 PROCEDURE — 3331090002 HH PPS REVENUE DEBIT

## 2020-02-15 PROCEDURE — 3331090001 HH PPS REVENUE CREDIT

## 2020-02-15 PROCEDURE — 3331090002 HH PPS REVENUE DEBIT

## 2020-02-16 PROCEDURE — 3331090001 HH PPS REVENUE CREDIT

## 2020-02-16 PROCEDURE — 3331090002 HH PPS REVENUE DEBIT

## 2020-02-17 ENCOUNTER — PATIENT OUTREACH (OUTPATIENT)
Dept: INTERNAL MEDICINE CLINIC | Age: 80
End: 2020-02-17

## 2020-02-17 PROCEDURE — 3331090001 HH PPS REVENUE CREDIT

## 2020-02-17 PROCEDURE — 3331090002 HH PPS REVENUE DEBIT

## 2020-02-18 ENCOUNTER — HOME CARE VISIT (OUTPATIENT)
Dept: HOME HEALTH SERVICES | Facility: HOME HEALTH | Age: 80
End: 2020-02-18
Payer: MEDICARE

## 2020-02-18 DIAGNOSIS — E03.2 HYPOTHYROIDISM DUE TO MEDICATION: ICD-10-CM

## 2020-02-18 PROCEDURE — 3331090002 HH PPS REVENUE DEBIT

## 2020-02-18 PROCEDURE — 3331090001 HH PPS REVENUE CREDIT

## 2020-02-18 RX ORDER — LEVOTHYROXINE SODIUM 175 UG/1
TABLET ORAL
Qty: 90 TAB | Refills: 3 | Status: SHIPPED | OUTPATIENT
Start: 2020-02-18 | End: 2020-02-27 | Stop reason: SDUPTHER

## 2020-02-19 ENCOUNTER — APPOINTMENT (OUTPATIENT)
Dept: CT IMAGING | Age: 80
DRG: 637 | End: 2020-02-19
Attending: INTERNAL MEDICINE
Payer: MEDICARE

## 2020-02-19 ENCOUNTER — HOSPITAL ENCOUNTER (INPATIENT)
Age: 80
LOS: 2 days | Discharge: HOME HEALTH | DRG: 637 | End: 2020-02-21
Attending: EMERGENCY MEDICINE | Admitting: INTERNAL MEDICINE
Payer: MEDICARE

## 2020-02-19 ENCOUNTER — APPOINTMENT (OUTPATIENT)
Dept: GENERAL RADIOLOGY | Age: 80
DRG: 637 | End: 2020-02-19
Attending: EMERGENCY MEDICINE
Payer: MEDICARE

## 2020-02-19 ENCOUNTER — HOME CARE VISIT (OUTPATIENT)
Dept: SCHEDULING | Facility: HOME HEALTH | Age: 80
End: 2020-02-19
Payer: MEDICARE

## 2020-02-19 DIAGNOSIS — E13.10 DIABETIC KETOACIDOSIS WITHOUT COMA ASSOCIATED WITH OTHER SPECIFIED DIABETES MELLITUS (HCC): Primary | ICD-10-CM

## 2020-02-19 LAB
ALBUMIN SERPL-MCNC: 4 G/DL (ref 3.5–5)
ALBUMIN/GLOB SERPL: 1.1 {RATIO} (ref 1.1–2.2)
ALP SERPL-CCNC: 183 U/L (ref 45–117)
ALT SERPL-CCNC: 57 U/L (ref 12–78)
ANION GAP SERPL CALC-SCNC: 15 MMOL/L (ref 5–15)
ANION GAP SERPL CALC-SCNC: 27 MMOL/L (ref 5–15)
ANION GAP SERPL CALC-SCNC: ABNORMAL MMOL/L (ref 5–15)
ARTERIAL PATENCY WRIST A: ABNORMAL
AST SERPL-CCNC: 25 U/L (ref 15–37)
ATRIAL RATE: 109 BPM
BASOPHILS # BLD: 0 K/UL (ref 0–0.1)
BASOPHILS NFR BLD: 0 % (ref 0–1)
BDY SITE: ABNORMAL
BILIRUB SERPL-MCNC: 0.6 MG/DL (ref 0.2–1)
BUN SERPL-MCNC: 32 MG/DL (ref 6–20)
BUN SERPL-MCNC: 34 MG/DL (ref 6–20)
BUN SERPL-MCNC: 37 MG/DL (ref 6–20)
BUN/CREAT SERPL: 16 (ref 12–20)
BUN/CREAT SERPL: 18 (ref 12–20)
BUN/CREAT SERPL: 18 (ref 12–20)
CA-I BLD-SCNC: 1.26 MMOL/L (ref 1.12–1.32)
CALCIUM SERPL-MCNC: 8.4 MG/DL (ref 8.5–10.1)
CALCIUM SERPL-MCNC: 8.6 MG/DL (ref 8.5–10.1)
CALCIUM SERPL-MCNC: 9.4 MG/DL (ref 8.5–10.1)
CALCULATED P AXIS, ECG09: 55 DEGREES
CALCULATED R AXIS, ECG10: -57 DEGREES
CALCULATED T AXIS, ECG11: 46 DEGREES
CHLORIDE SERPL-SCNC: 105 MMOL/L (ref 97–108)
CHLORIDE SERPL-SCNC: 114 MMOL/L (ref 97–108)
CHLORIDE SERPL-SCNC: 94 MMOL/L (ref 97–108)
CO2 SERPL-SCNC: 15 MMOL/L (ref 21–32)
CO2 SERPL-SCNC: 5 MMOL/L (ref 21–32)
CO2 SERPL-SCNC: <5 MMOL/L (ref 21–32)
CREAT SERPL-MCNC: 1.77 MG/DL (ref 0.55–1.02)
CREAT SERPL-MCNC: 2.08 MG/DL (ref 0.55–1.02)
CREAT SERPL-MCNC: 2.08 MG/DL (ref 0.55–1.02)
DIAGNOSIS, 93000: NORMAL
DIFFERENTIAL METHOD BLD: ABNORMAL
EOSINOPHIL # BLD: 0 K/UL (ref 0–0.4)
EOSINOPHIL NFR BLD: 0 % (ref 0–7)
ERYTHROCYTE [DISTWIDTH] IN BLOOD BY AUTOMATED COUNT: 13.2 % (ref 11.5–14.5)
EST. AVERAGE GLUCOSE BLD GHB EST-MCNC: 292 MG/DL
GAS FLOW.O2 O2 DELIVERY SYS: ABNORMAL L/MIN
GLOBULIN SER CALC-MCNC: 3.8 G/DL (ref 2–4)
GLUCOSE BLD STRIP.AUTO-MCNC: 163 MG/DL (ref 65–100)
GLUCOSE BLD STRIP.AUTO-MCNC: 230 MG/DL (ref 65–100)
GLUCOSE BLD STRIP.AUTO-MCNC: 264 MG/DL (ref 65–100)
GLUCOSE BLD STRIP.AUTO-MCNC: 326 MG/DL (ref 65–100)
GLUCOSE BLD STRIP.AUTO-MCNC: 391 MG/DL (ref 65–100)
GLUCOSE BLD STRIP.AUTO-MCNC: 488 MG/DL (ref 65–100)
GLUCOSE BLD STRIP.AUTO-MCNC: >600 MG/DL (ref 65–100)
GLUCOSE SERPL-MCNC: 292 MG/DL (ref 65–100)
GLUCOSE SERPL-MCNC: 625 MG/DL (ref 65–100)
GLUCOSE SERPL-MCNC: 918 MG/DL (ref 65–100)
HBA1C MFR BLD: 11.8 % (ref 4–5.6)
HCT VFR BLD AUTO: 41.6 % (ref 35–47)
HGB BLD-MCNC: 13.4 G/DL (ref 11.5–16)
IMM GRANULOCYTES # BLD AUTO: 0.1 K/UL (ref 0–0.04)
IMM GRANULOCYTES NFR BLD AUTO: 1 % (ref 0–0.5)
LACTATE SERPL-SCNC: 5.7 MMOL/L (ref 0.4–2)
LYMPHOCYTES # BLD: 1.3 K/UL (ref 0.8–3.5)
LYMPHOCYTES NFR BLD: 11 % (ref 12–49)
MAGNESIUM SERPL-MCNC: 2.3 MG/DL (ref 1.6–2.4)
MAGNESIUM SERPL-MCNC: 2.5 MG/DL (ref 1.6–2.4)
MAGNESIUM SERPL-MCNC: 2.5 MG/DL (ref 1.6–2.4)
MCH RBC QN AUTO: 31.9 PG (ref 26–34)
MCHC RBC AUTO-ENTMCNC: 32.2 G/DL (ref 30–36.5)
MCV RBC AUTO: 99 FL (ref 80–99)
MONOCYTES # BLD: 0.5 K/UL (ref 0–1)
MONOCYTES NFR BLD: 5 % (ref 5–13)
NEUTS SEG # BLD: 9.9 K/UL (ref 1.8–8)
NEUTS SEG NFR BLD: 83 % (ref 32–75)
NRBC # BLD: 0 K/UL (ref 0–0.01)
NRBC BLD-RTO: 0 PER 100 WBC
P-R INTERVAL, ECG05: 146 MS
PCO2 BLD: <15 MMHG (ref 35–45)
PH BLD: 7.01 [PH] (ref 7.35–7.45)
PHOSPHATE SERPL-MCNC: 4.7 MG/DL (ref 2.6–4.7)
PHOSPHATE SERPL-MCNC: 8.3 MG/DL (ref 2.6–4.7)
PLATELET # BLD AUTO: 259 K/UL (ref 150–400)
PMV BLD AUTO: 11.2 FL (ref 8.9–12.9)
PO2 BLD: 141 MMHG (ref 80–100)
POTASSIUM SERPL-SCNC: 3.2 MMOL/L (ref 3.5–5.1)
POTASSIUM SERPL-SCNC: 4 MMOL/L (ref 3.5–5.1)
POTASSIUM SERPL-SCNC: 6.1 MMOL/L (ref 3.5–5.1)
PROT SERPL-MCNC: 7.8 G/DL (ref 6.4–8.2)
Q-T INTERVAL, ECG07: 358 MS
QRS DURATION, ECG06: 90 MS
QTC CALCULATION (BEZET), ECG08: 482 MS
RBC # BLD AUTO: 4.2 M/UL (ref 3.8–5.2)
SERVICE CMNT-IMP: ABNORMAL
SODIUM SERPL-SCNC: 130 MMOL/L (ref 136–145)
SODIUM SERPL-SCNC: 137 MMOL/L (ref 136–145)
SODIUM SERPL-SCNC: 144 MMOL/L (ref 136–145)
SPECIMEN TYPE: ABNORMAL
TOTAL RESP. RATE, ITRR: 33
VENTRICULAR RATE, ECG03: 109 BPM
WBC # BLD AUTO: 11.9 K/UL (ref 3.6–11)

## 2020-02-19 PROCEDURE — 84100 ASSAY OF PHOSPHORUS: CPT

## 2020-02-19 PROCEDURE — 93005 ELECTROCARDIOGRAM TRACING: CPT

## 2020-02-19 PROCEDURE — 74011250636 HC RX REV CODE- 250/636: Performed by: EMERGENCY MEDICINE

## 2020-02-19 PROCEDURE — 74011636637 HC RX REV CODE- 636/637: Performed by: EMERGENCY MEDICINE

## 2020-02-19 PROCEDURE — 74011000258 HC RX REV CODE- 258: Performed by: INTERNAL MEDICINE

## 2020-02-19 PROCEDURE — 74011250636 HC RX REV CODE- 250/636: Performed by: INTERNAL MEDICINE

## 2020-02-19 PROCEDURE — 82803 BLOOD GASES ANY COMBINATION: CPT

## 2020-02-19 PROCEDURE — 83735 ASSAY OF MAGNESIUM: CPT

## 2020-02-19 PROCEDURE — 87040 BLOOD CULTURE FOR BACTERIA: CPT

## 2020-02-19 PROCEDURE — 3331090002 HH PPS REVENUE DEBIT

## 2020-02-19 PROCEDURE — 74176 CT ABD & PELVIS W/O CONTRAST: CPT

## 2020-02-19 PROCEDURE — 36415 COLL VENOUS BLD VENIPUNCTURE: CPT

## 2020-02-19 PROCEDURE — 83036 HEMOGLOBIN GLYCOSYLATED A1C: CPT

## 2020-02-19 PROCEDURE — 83605 ASSAY OF LACTIC ACID: CPT

## 2020-02-19 PROCEDURE — 85025 COMPLETE CBC W/AUTO DIFF WBC: CPT

## 2020-02-19 PROCEDURE — 82962 GLUCOSE BLOOD TEST: CPT

## 2020-02-19 PROCEDURE — 74011000258 HC RX REV CODE- 258: Performed by: EMERGENCY MEDICINE

## 2020-02-19 PROCEDURE — 71045 X-RAY EXAM CHEST 1 VIEW: CPT

## 2020-02-19 PROCEDURE — 74011000250 HC RX REV CODE- 250: Performed by: INTERNAL MEDICINE

## 2020-02-19 PROCEDURE — 96365 THER/PROPH/DIAG IV INF INIT: CPT

## 2020-02-19 PROCEDURE — 74011636637 HC RX REV CODE- 636/637: Performed by: INTERNAL MEDICINE

## 2020-02-19 PROCEDURE — 65660000000 HC RM CCU STEPDOWN

## 2020-02-19 PROCEDURE — 80053 COMPREHEN METABOLIC PANEL: CPT

## 2020-02-19 PROCEDURE — 3331090001 HH PPS REVENUE CREDIT

## 2020-02-19 PROCEDURE — 99285 EMERGENCY DEPT VISIT HI MDM: CPT

## 2020-02-19 PROCEDURE — 80048 BASIC METABOLIC PNL TOTAL CA: CPT

## 2020-02-19 RX ORDER — SODIUM CHLORIDE 0.9 % (FLUSH) 0.9 %
5-40 SYRINGE (ML) INJECTION AS NEEDED
Status: DISCONTINUED | OUTPATIENT
Start: 2020-02-19 | End: 2020-02-21 | Stop reason: HOSPADM

## 2020-02-19 RX ORDER — DEXTROSE, SODIUM CHLORIDE, AND POTASSIUM CHLORIDE 5; .45; .15 G/100ML; G/100ML; G/100ML
125 INJECTION INTRAVENOUS CONTINUOUS
Status: DISCONTINUED | OUTPATIENT
Start: 2020-02-19 | End: 2020-02-20

## 2020-02-19 RX ORDER — HEPARIN SODIUM 5000 [USP'U]/ML
5000 INJECTION, SOLUTION INTRAVENOUS; SUBCUTANEOUS EVERY 8 HOURS
Status: DISCONTINUED | OUTPATIENT
Start: 2020-02-19 | End: 2020-02-20

## 2020-02-19 RX ORDER — ONDANSETRON 2 MG/ML
4 INJECTION INTRAMUSCULAR; INTRAVENOUS
Status: DISCONTINUED | OUTPATIENT
Start: 2020-02-19 | End: 2020-02-21 | Stop reason: HOSPADM

## 2020-02-19 RX ORDER — SODIUM CHLORIDE 0.9 % (FLUSH) 0.9 %
5-40 SYRINGE (ML) INJECTION EVERY 8 HOURS
Status: DISCONTINUED | OUTPATIENT
Start: 2020-02-19 | End: 2020-02-21 | Stop reason: HOSPADM

## 2020-02-19 RX ORDER — DEXTROSE 50 % IN WATER (D50W) INTRAVENOUS SYRINGE
25-50 AS NEEDED
Status: DISCONTINUED | OUTPATIENT
Start: 2020-02-19 | End: 2020-02-20

## 2020-02-19 RX ORDER — MAGNESIUM SULFATE 100 %
4 CRYSTALS MISCELLANEOUS AS NEEDED
Status: DISCONTINUED | OUTPATIENT
Start: 2020-02-19 | End: 2020-02-19 | Stop reason: SDUPTHER

## 2020-02-19 RX ORDER — METOPROLOL SUCCINATE 25 MG/1
25 TABLET, EXTENDED RELEASE ORAL DAILY
Status: DISCONTINUED | OUTPATIENT
Start: 2020-02-20 | End: 2020-02-21 | Stop reason: HOSPADM

## 2020-02-19 RX ORDER — SODIUM CHLORIDE 9 MG/ML
130 INJECTION, SOLUTION INTRAVENOUS CONTINUOUS
Status: DISCONTINUED | OUTPATIENT
Start: 2020-02-19 | End: 2020-02-19

## 2020-02-19 RX ORDER — POTASSIUM CHLORIDE AND SODIUM CHLORIDE 450; 150 MG/100ML; MG/100ML
INJECTION, SOLUTION INTRAVENOUS CONTINUOUS
Status: DISCONTINUED | OUTPATIENT
Start: 2020-02-19 | End: 2020-02-20

## 2020-02-19 RX ORDER — AMLODIPINE BESYLATE 5 MG/1
10 TABLET ORAL DAILY
Status: DISCONTINUED | OUTPATIENT
Start: 2020-02-20 | End: 2020-02-21 | Stop reason: HOSPADM

## 2020-02-19 RX ORDER — INSULIN LISPRO 100 [IU]/ML
INJECTION, SOLUTION INTRAVENOUS; SUBCUTANEOUS
Status: DISCONTINUED | OUTPATIENT
Start: 2020-02-19 | End: 2020-02-19 | Stop reason: SDUPTHER

## 2020-02-19 RX ORDER — CLOPIDOGREL BISULFATE 75 MG/1
75 TABLET ORAL DAILY
Status: DISCONTINUED | OUTPATIENT
Start: 2020-02-20 | End: 2020-02-21 | Stop reason: HOSPADM

## 2020-02-19 RX ORDER — INSULIN LISPRO 100 [IU]/ML
INJECTION, SOLUTION INTRAVENOUS; SUBCUTANEOUS
Status: DISCONTINUED | OUTPATIENT
Start: 2020-02-19 | End: 2020-02-20

## 2020-02-19 RX ORDER — DEXTROSE 50 % IN WATER (D50W) INTRAVENOUS SYRINGE
25-50 AS NEEDED
Status: DISCONTINUED | OUTPATIENT
Start: 2020-02-19 | End: 2020-02-19 | Stop reason: SDUPTHER

## 2020-02-19 RX ORDER — ACETAMINOPHEN 325 MG/1
650 TABLET ORAL
Status: DISCONTINUED | OUTPATIENT
Start: 2020-02-19 | End: 2020-02-21 | Stop reason: HOSPADM

## 2020-02-19 RX ORDER — SODIUM BICARBONATE 84 MG/ML
50 INJECTION, SOLUTION INTRAVENOUS ONCE
Status: COMPLETED | OUTPATIENT
Start: 2020-02-19 | End: 2020-02-19

## 2020-02-19 RX ORDER — MAGNESIUM SULFATE 100 %
4 CRYSTALS MISCELLANEOUS AS NEEDED
Status: DISCONTINUED | OUTPATIENT
Start: 2020-02-19 | End: 2020-02-20

## 2020-02-19 RX ADMIN — SODIUM CHLORIDE 13.6 UNITS/HR: 9 INJECTION, SOLUTION INTRAVENOUS at 21:58

## 2020-02-19 RX ADMIN — Medication 10 ML: at 23:02

## 2020-02-19 RX ADMIN — SODIUM CHLORIDE 18.6 UNITS/HR: 9 INJECTION, SOLUTION INTRAVENOUS at 19:54

## 2020-02-19 RX ADMIN — HUMAN INSULIN 10 UNITS: 100 INJECTION, SOLUTION SUBCUTANEOUS at 17:06

## 2020-02-19 RX ADMIN — SODIUM CHLORIDE 8.2 UNITS/HR: 9 INJECTION, SOLUTION INTRAVENOUS at 23:01

## 2020-02-19 RX ADMIN — SODIUM BICARBONATE 50 MEQ: 84 INJECTION, SOLUTION INTRAVENOUS at 17:06

## 2020-02-19 RX ADMIN — SODIUM CHLORIDE 130 ML/HR: 900 INJECTION, SOLUTION INTRAVENOUS at 17:30

## 2020-02-19 RX ADMIN — SODIUM CHLORIDE 19.9 UNITS/HR: 9 INJECTION, SOLUTION INTRAVENOUS at 18:38

## 2020-02-19 RX ADMIN — SODIUM CHLORIDE 1000 ML: 900 INJECTION, SOLUTION INTRAVENOUS at 12:32

## 2020-02-19 RX ADMIN — SODIUM CHLORIDE 16.3 UNITS/HR: 9 INJECTION, SOLUTION INTRAVENOUS at 20:52

## 2020-02-19 RX ADMIN — SODIUM CHLORIDE AND POTASSIUM CHLORIDE: 4.5; 1.49 INJECTION, SOLUTION INTRAVENOUS at 18:52

## 2020-02-19 RX ADMIN — SODIUM CHLORIDE 10.8 UNITS/HR: 9 INJECTION, SOLUTION INTRAVENOUS at 13:17

## 2020-02-19 RX ADMIN — SODIUM CHLORIDE 1000 ML: 900 INJECTION, SOLUTION INTRAVENOUS at 15:27

## 2020-02-19 RX ADMIN — HEPARIN SODIUM 5000 UNITS: 5000 INJECTION INTRAVENOUS; SUBCUTANEOUS at 22:30

## 2020-02-19 RX ADMIN — SODIUM CHLORIDE 16.2 UNITS/HR: 9 INJECTION, SOLUTION INTRAVENOUS at 14:37

## 2020-02-19 RX ADMIN — HEPARIN SODIUM 5000 UNITS: 5000 INJECTION INTRAVENOUS; SUBCUTANEOUS at 17:05

## 2020-02-19 NOTE — PROGRESS NOTES
Goals Addressed                 This Visit's Progress     Supportive resources: medicaid assistance and options for caregiver support          2/18/20  YOLANDA discussed with daughter about the purpose and benefit of receiving Diabetes education from [facility]. Daughter stated that she understands how to manage pt's medical condition, but the challenge is ensuring that pt eat her meals and take medications consistently. Daughter stated that she calls her mom several times during the day to remind her to take meds, eat meals etc.  SW discussed the option of patient attending an adult  program during the week to assist with needs until her personal care is established. Daughter stated that pt would not be interested in attending but she will share this with pt as an option. YOLANDA contacted [facility] to iniate the [facility] screening for personal care. SW advised daughter to contact Mrs. Atilio Osman at [facility] [phone] to provide additional information for screening. [place] [address] Ambulatory Care Coordination Team  [phone]    2/17/20  YOLANDA contacted daughter for f/u. Daughter reported that she/pt submitted the medicaid application. SW explained to daughter the f/u process and services offered though medicaid. SW/daughter also discussed the benefit of having pt's medications bubbled pack and advised her to contact pharmacy to inquire about the process. YOLANDA will f/u with Rimma Thomas RD to coordinate a meeting for pt/daughter to receive diabetes education. SW will f/u at a later date. [place] [address] Ambulatory Care Coordination Team  [phone]  1/31/2020   YOLANDA attempted to contacted patient's daughter to follow up on progress made with completing medicaid application and offer assistance. YOLANDA left a voicemail requesting a call back.   [facility] Transitions Team   [facility] Coordination Team  [phone]    1/15/2020   MARLEN GUERRERO received a call back from [facility] Eric Mohr providing an update on patients home visit. SW met with patient in the home and spoke with the daughter via phone during the visit. Arabella Fernandez reported there were no concerns observed during the visit. Arabella Fernandez reported that patient was alert, verbal and oriented during the visit, as she appropriately shared information and answered questions. However, It is noted that [facility] SN created goals and interventions to address patients cognitive impairment. Connerroselyn Jenkinsshyanne discussed with daughter the importance of having Medicaid coverage in place, as it could provide personal care aides to assist patient in the home. SW explained to daughter the concerns that were shared regarding patients care in the home and offered to schedule a date and time to meet with her to complete a Medicaid application and submit a request for [employer] screening, but daughter was not able to confirm a date. ALMA DELIA GUERRERO will follow up with daughter at a later date to schedule meeting. See [facility] YOLANDA note. [place] Ambulatory Care Coordination Team  [phone]    1/10/2020 3:00pm  MARLEN GUERRERO received a call back from [facility] YOLANDA, Ale Rhodes. CTSATNAM GUERRERO updated her on the concerns related to patient's care and safety in the home. 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 According to patients daughter, patient owns her home and the son lives with her. Patients daughter is involved with her care but does not live with her.      ALMA DELIA GUERRERO made aware of the resources that were provided to daughter regarding the Medicaid process and discussed the possibility of patient participating in [facility]note 3] chronic care program for disease management. HH SW is scheduled to conduct a home visit with patient next week. Due to patients cognition limitations, SW suggested that [facility] SW contact patients son/daughter to schedule a home visit. CTN SW mentioned, pending the outcome of home assessment there may be a need for an APS referral.  SW acknowledged the report given and will follow up with CTN SW at a later date to discuss findings. [place] [address] Ambulatory Care Coordination Team  [phone]    1/9/2020 2:30am  [address] [facility] rep, reported that patient is scheduled to receive a home visit from Fabiano Solano on 1-13-20. CTN SW called home health SW, Renetta Sharma requesting a call back to discuss concerns reg patient's care/safety and address social needs. SW will attempt call at a later date if call is not returned. [place] [address] Ambulatory Care Coordination Team  [phone]    1/7/2020  1:00pm  It is noted that patient was recently discharged from the hospital due to DKA. SW contacted patient's daughter, Rosmery Mcallister, to discuss resources to address patient's needs. Daughter stated that she was busy, but was receptive to taking a few minutes to discuss options related to patient's care. SW explained to daughter the Medicaid process along with the benefits of Medicaid coverage. SW highlighted a few Medicaid services that patient could receive if approved, such as personal care, adult  coverage, assistance with medication cost and alternative placement if needed. Daughter acknowledged the services and stated that her goal is to care for patient in the home. Patient's son live with her and daughter assist with providing care.  SW advised daughter to contact [address] [place] [address] to initiate the application process, as it could take up to 45 days for [facility] approval.  Daughter understood and stated she and patient will complete the application by the end of the week. Daughter denied any additional resources at this time. SW provided daughter my contact information to contact if needed      SW will contact daughter within the next two weeks to assess progress made with completing application, discuss ACP items and offer assistance if needed. [address] [facility] Team   [facility] Ambulatory Care Coordination Team  [phone]     12/17/2019  1:30pm  SW attempted to contact patient's daughter for follow up. SW left a detailed voicemail requesting a call back, and suggested that she leave on my voicemail best time/date to reach her. SW will follow up at a later date. 12/13/2019  1:25pm  Patient's daughter requested assistance with understanding medicaid process and initiating application. SW contacted daughter however, she reported that she was unable to talk and stated she would contact SW [phone] at 1:30pm.      Racquel Beaulieu will attempt contact on 12-16-19 if call is not returned. YOLANDA will offer assistance with medicaid process and additional resources if needed.         [address] [facility] Team

## 2020-02-19 NOTE — H&P
Hospitalist Admission Note    NAME: Kimberly Mcbride   :  1940   MRN:  [id number]     Date/Time:  2020 3:26 PM    Patient PCP: Mandeep Cortez MD  ______________________________________________________________________  Given the patient's current clinical presentation, I have a high level of concern for decompensation if discharged from the emergency department. Complex decision making was performed, which includes reviewing the patient's available past medical records, laboratory results, and x-ray films. My assessment of this patient's clinical condition and my plan of care is as follows. Assessment / Plan:  Acute metabolic encephalopathy POA  DKA POA  Hyperkalemia POA  ONEYDA POA  Abdominal pain POA    Likely due to noncompliance with medications  Initial AG >31.   -Placed on insulin drip given insulin bolus  -Initial glucose 918, with bicarb less than 5.  pH 7.0 .will give 1 amp of bicarb  -BCX, UA sent  CT abdomen/pelvis    -Insulin drip, DKA protocol  - cc/hr  -Check BMP q4h    H/o HTN  H/o Hypothyroidism  H/o CVA    Multiple Hospitalizations for Hyperglycemia, also dealing with Hypoglycemia as well      Code Status:Full Code      DVT Prophylaxis: heparin  GI Prophylaxis: not indicated    Baseline: ambulatory  May need Long term placement. Subjective:   CHIEF COMPLAINT: High glucose    HISTORY OF PRESENT ILLNESS:     Sai Lindo is a 67 y.o.  female who presents with past medical history of insulin-dependent diabetes mellitus, hypothyroidism, hypertension was brought to ED today because of weakness and lethargy. Patient is being cared by her handicapped and disabled son, he called the EMS stating that her glucose is high. In the ED she is awake but very lethargic and drowsy has Kussmaul breathing. Discussed with her daughter she said patient has not been feeling right since yesterday, has not taken her insulin since yesterday.        We were asked to admit for work up and evaluation of the above problems. Past Medical History:   Diagnosis Date    Diabetes [facility]     Heart failure ([facility]     unknown to family    Hypercholesteremia     Hypertension     Stroke ([facility]     Thyroid disease         Past Surgical History:   Procedure Laterality Date    HX GYN      HX HEENT      thyroidectomy    HX HYSTERECTOMY      REMOVAL GALLBLADDER      THYROIDECTOMY         Social History     Tobacco Use    Smoking status: Never Smoker    Smokeless tobacco: Never Used   Substance Use Topics    Alcohol use: No     Comment: been years        Family History   Problem Relation Age of Onset    Diabetes Father     No Known Problems Mother      No Known Allergies     Prior to Admission medications    Medication Sig Start Date End Date Taking? Authorizing Provider   levothyroxine (SYNTHROID) 175 mcg tablet TAKE 1 TABLET BY MOUTH EVERY DAY 2/18/20   Arianna Lambert MD   clopidogreL (PLAVIX) 75 mg tab TAKE 1 TABLET BY MOUTH EVERY DAY 1/16/20   Arianna Lambert MD   insulin degludec (TRESIBA FLEXTOUCH U-100) 100 unit/mL (3 mL) inpn 16 Units by SubCUTAneous route daily. Provider, Historical   amLODIPine (NORVASC) 10 mg tablet TAKE 1 TABLET BY MOUTH EVERY DAY IN THE MORNING 11/3/19   Arianna Lambert MD   pravastatin (PRAVACHOL) 80 mg tablet TAKE 1 TABLET BY MOUTH EVERY DAY 11/3/19   Arianna Lambert MD   metoprolol succinate (TOPROL-XL) 25 mg XL tablet Take 1 Tab by mouth daily. 6/15/19   Arianna Lambert MD   insulin aspart U-100 (NOVOLOG) 100 unit/mL (3 mL) inpn 4 units AC breakfast,lunch, and dinner  Patient taking differently: 4 Units by SubCUTAneous route Before breakfast, lunch, and dinner. 4 units AC breakfast,lunch, and dinner 6/15/19   Arianna Lambert MD   brimonidine (ALPHAGAN) 0.15 % ophthalmic solution Administer 1 Drop to both eyes two (2) times a day.  1/30/15   Provider, Historical       REVIEW OF SYSTEMS:     I am not able to complete the review of systems because: The patient is intubated and sedated    The patient has altered mental status due to his acute medical problems    The patient has baseline aphasia from prior stroke(s)   x The patient has baseline dementia and is not reliable historian   x The patient is in acute medical distress and unable to provide information           Total of 12 systems reviewed as follows:       POSITIVE= underlined text  Negative = text not underlined  General:  fever, chills, sweats, generalized weakness, weight loss/gain,      loss of appetite   Eyes:    blurred vision, eye pain, loss of vision, double vision  ENT:    rhinorrhea, pharyngitis   Respiratory:   cough, sputum production, SOB, AGUILERA, wheezing, pleuritic pain   Cardiology:   chest pain, palpitations, orthopnea, PND, edema, syncope   Gastrointestinal:  abdominal pain , N/V, diarrhea, dysphagia, constipation, bleeding   Genitourinary:  frequency, urgency, dysuria, hematuria, incontinence   Muskuloskeletal :  arthralgia, myalgia, back pain  Hematology:  easy bruising, nose or gum bleeding, lymphadenopathy   Dermatological: rash, ulceration, pruritis, color change / jaundice  Endocrine:   hot flashes or polydipsia   Neurological:  headache, dizziness, confusion, focal weakness, paresthesia,     Speech difficulties, memory loss, gait difficulty  Psychological: Feelings of anxiety, depression, agitation    Objective:   VITALS:    Visit Vitals  /43   Pulse (!) 105   Temp 99.7 °F (37.6 °C)   Resp (!) 34   Ht 5' 2\" (1.575 m)   Wt 58.1 kg (128 lb 1.4 oz)   SpO2 100%   BMI 23.43 kg/m²       PHYSICAL EXAM:    General:    Distress + ,confused     HEENT: Atraumatic, anicteric sclerae, pink conjunctivae  Neck:  Supple, symmetrical,  thyroid: non tender  Lungs:   Clear to auscultation bilaterally. No Wheezing or Rhonchi. No rales. Chest wall:  No tenderness  No Accessory muscle use.   Heart:   Regular  rhythm,  No  murmur   No edema  Abdomen:   Soft, minimal abd tenderness  Extremities: No cyanosis. No clubbing,    Skin:     Not pale. Not Jaundiced  No rashes   Psych:  Unable to assess  Neurologic: Moves all 4 extr    _______________________________________________________________________  Care Plan discussed with:    Comments   Patient x    Family      RN x    Care Manager                    Consultant:      _______________________________________________________________________  Expected  Disposition:   Home with Family x   HH/PT/OT/RN    SNF/LTC    TAYA    ________________________________________________________________________  TOTAL TIME:  48 Minutes    Critical Care Provided     Minutes non procedure based      Comments     Reviewed previous records   >50% of visit spent in counseling and coordination of care  Discussion with patient and/or family and questions answered       ________________________________________________________________________  Signed: Nusrat White MD    Procedures: see electronic medical records for all procedures/Xrays and details which were not copied into this note but were reviewed prior to creation of Plan.     LAB DATA REVIEWED:    Recent Results (from the past 24 hour(s))   GLUCOSE, POC    Collection Time: 02/19/20 11:57 AM   Result Value Ref Range    Glucose (POC) >600 (HH) 65 - 100 mg/dL    Performed by Yesi Whiting    GLUCOSE, POC    Collection Time: 02/19/20 11:58 AM   Result Value Ref Range    Glucose (POC) >600 (HH) 65 - 100 mg/dL    Performed by YesiMister Spex    CBC WITH AUTOMATED DIFF    Collection Time: 02/19/20 12:02 PM   Result Value Ref Range    WBC 11.9 (H) 3.6 - 11.0 K/uL    RBC 4.20 3.80 - 5.20 M/uL    HGB 13.4 11.5 - 16.0 g/dL    HCT 41.6 35.0 - 47.0 %    MCV 99.0 80.0 - 99.0 FL    MCH 31.9 26.0 - 34.0 PG    MCHC 32.2 30.0 - 36.5 g/dL    RDW 13.2 11.5 - 14.5 %    PLATELET 355 084 - 687 K/uL    MPV 11.2 8.9 - 12.9 FL    NRBC 0.0 0  WBC    ABSOLUTE NRBC 0.00 0.00 - 0.01 K/uL    NEUTROPHILS 83 (H) 32 - 75 % LYMPHOCYTES 11 (L) 12 - 49 %    MONOCYTES 5 5 - 13 %    EOSINOPHILS 0 0 - 7 %    BASOPHILS 0 0 - 1 %    IMMATURE GRANULOCYTES 1 (H) 0.0 - 0.5 %    ABS. NEUTROPHILS 9.9 (H) 1.8 - 8.0 K/UL    ABS. LYMPHOCYTES 1.3 0.8 - 3.5 K/UL    ABS. MONOCYTES 0.5 0.0 - 1.0 K/UL    ABS. EOSINOPHILS 0.0 0.0 - 0.4 K/UL    ABS. BASOPHILS 0.0 0.0 - 0.1 K/UL    ABS. IMM. GRANS. 0.1 (H) 0.00 - 0.04 K/UL    DF AUTOMATED     METABOLIC PANEL, COMPREHENSIVE    Collection Time: 02/19/20 12:02 PM   Result Value Ref Range    Sodium 130 (L) 136 - 145 mmol/L    Potassium 6.1 (H) 3.5 - 5.1 mmol/L    Chloride 94 (L) 97 - 108 mmol/L    CO2 <5 (LL) 21 - 32 mmol/L    Anion gap Cannot be calculated 5 - 15 mmol/L    Glucose 918 (HH) 65 - 100 mg/dL    BUN 34 (H) 6 - 20 MG/DL    Creatinine 2.08 (H) 0.55 - 1.02 MG/DL    BUN/Creatinine ratio 16 12 - 20      GFR est AA 28 (L) >60 ml/min/1.73m2    GFR est non-AA 23 (L) >60 ml/min/1.73m2    Calcium 9.4 8.5 - 10.1 MG/DL    Bilirubin, total 0.6 0.2 - 1.0 MG/DL    ALT (SGPT) 57 12 - 78 U/L    AST (SGOT) 25 15 - 37 U/L    Alk.  phosphatase 183 (H) 45 - 117 U/L    Protein, total 7.8 6.4 - 8.2 g/dL    Albumin 4.0 3.5 - 5.0 g/dL    Globulin 3.8 2.0 - 4.0 g/dL    A-G Ratio 1.1 1.1 - 2.2     MAGNESIUM    Collection Time: 02/19/20 12:02 PM   Result Value Ref Range    Magnesium 2.5 (H) 1.6 - 2.4 mg/dL   PHOSPHORUS    Collection Time: 02/19/20 12:02 PM   Result Value Ref Range    Phosphorus 8.3 (H) 2.6 - 4.7 MG/DL   EKG, 12 LEAD, INITIAL    Collection Time: 02/19/20 12:11 PM   Result Value Ref Range    Ventricular Rate 109 BPM    Atrial Rate 109 BPM    P-R Interval 146 ms    QRS Duration 90 ms    Q-T Interval 358 ms    QTC Calculation (Bezet) 482 ms    Calculated P Axis 55 degrees    Calculated R Axis -57 degrees    Calculated T Axis 46 degrees    Diagnosis       Sinus tachycardia  Possible Left atrial enlargement  Left axis deviation  Nonspecific ST abnormality  When compared with ECG of 25-DEC-2019 15:00,  premature ventricular complexes are no longer present     GLUCOSE, POC    Collection Time: 02/19/20  1:14 PM   Result Value Ref Range    Glucose (POC) >600 (HH) 65 - 100 mg/dL    Performed by Tico Galicia MSN    GLUCOSE, POC    Collection Time: 02/19/20  1:15 PM   Result Value Ref Range    Glucose (POC) >600 (HH) 65 - 100 mg/dL    Performed by Tico Galicia MSN    GLUCOSE, POC    Collection Time: 02/19/20  2:32 PM   Result Value Ref Range    Glucose (POC) >600 (HH) 65 - 100 mg/dL    Performed by Hai CANTRELL    GLUCOSE, POC    Collection Time: 02/19/20  2:35 PM   Result Value Ref Range    Glucose (POC) >600 (HH) 65 - 100 mg/dL    Performed by Hai CANTRELL    POC EG7    Collection Time: 02/19/20  3:06 PM   Result Value Ref Range    Calcium, ionized (POC) 1.26 1.12 - 1.32 mmol/L    pH (POC) 7.014 (LL) 7.35 - 7.45      pCO2 (POC) <15.0 (L) 35.0 - 45.0 MMHG    pO2 (POC) 141 (H) 80 - 100 MMHG    Site OTHER      Device: ROOM AIR      Allens test (POC) N/A      Specimen type (POC) VENOUS BLOOD      Total resp.  rate 33     GLUCOSE, POC    Collection Time: 02/19/20  3:22 PM   Result Value Ref Range    Glucose (POC) >600 (HH) 65 - 100 mg/dL    Performed by Hai CANTRELL    GLUCOSE, POC    Collection Time: 02/19/20  3:24 PM   Result Value Ref Range    Glucose (POC) >600 (HH) 65 - 100 mg/dL    Performed by Hai CANTRELL

## 2020-02-19 NOTE — PROGRESS NOTES
Lab called with BMP results. Critical CO2 pf 5, Glucose of 627, and Lactic acid of 5.7. Sent message to Dr. Tommy Alcantara through [employer].

## 2020-02-19 NOTE — ED TRIAGE NOTES
Patient arrived via EMS from home, Patients handicap son called [phone] because he thought his mom was having a hypoglycemic episode. Patient was actually hyperglycemic according to EMS CBS reading enroute was \"HI\" patient is awake responding but unable to answer all questions appropriately. CBS taken upon arrival and 2 readings of \"HI\" obtained.   Patient IV placed and labs drawn

## 2020-02-20 ENCOUNTER — PATIENT OUTREACH (OUTPATIENT)
Dept: INTERNAL MEDICINE CLINIC | Age: 80
End: 2020-02-20

## 2020-02-20 LAB
ANION GAP SERPL CALC-SCNC: 11 MMOL/L (ref 5–15)
ANION GAP SERPL CALC-SCNC: 11 MMOL/L (ref 5–15)
ANION GAP SERPL CALC-SCNC: 4 MMOL/L (ref 5–15)
ANION GAP SERPL CALC-SCNC: 6 MMOL/L (ref 5–15)
ANION GAP SERPL CALC-SCNC: 8 MMOL/L (ref 5–15)
BUN SERPL-MCNC: 26 MG/DL (ref 6–20)
BUN SERPL-MCNC: 29 MG/DL (ref 6–20)
BUN SERPL-MCNC: 29 MG/DL (ref 6–20)
BUN SERPL-MCNC: 30 MG/DL (ref 6–20)
BUN SERPL-MCNC: 30 MG/DL (ref 6–20)
BUN/CREAT SERPL: 21 (ref 12–20)
BUN/CREAT SERPL: 21 (ref 12–20)
BUN/CREAT SERPL: 22 (ref 12–20)
BUN/CREAT SERPL: 23 (ref 12–20)
BUN/CREAT SERPL: 24 (ref 12–20)
CALCIUM SERPL-MCNC: 8.1 MG/DL (ref 8.5–10.1)
CALCIUM SERPL-MCNC: 8.2 MG/DL (ref 8.5–10.1)
CALCIUM SERPL-MCNC: 8.4 MG/DL (ref 8.5–10.1)
CALCIUM SERPL-MCNC: 8.4 MG/DL (ref 8.5–10.1)
CALCIUM SERPL-MCNC: 8.6 MG/DL (ref 8.5–10.1)
CHLORIDE SERPL-SCNC: 114 MMOL/L (ref 97–108)
CHLORIDE SERPL-SCNC: 116 MMOL/L (ref 97–108)
CHLORIDE SERPL-SCNC: 116 MMOL/L (ref 97–108)
CHLORIDE SERPL-SCNC: 118 MMOL/L (ref 97–108)
CHLORIDE SERPL-SCNC: 118 MMOL/L (ref 97–108)
CO2 SERPL-SCNC: 16 MMOL/L (ref 21–32)
CO2 SERPL-SCNC: 18 MMOL/L (ref 21–32)
CO2 SERPL-SCNC: 19 MMOL/L (ref 21–32)
CO2 SERPL-SCNC: 21 MMOL/L (ref 21–32)
CO2 SERPL-SCNC: 22 MMOL/L (ref 21–32)
CREAT SERPL-MCNC: 1.21 MG/DL (ref 0.55–1.02)
CREAT SERPL-MCNC: 1.24 MG/DL (ref 0.55–1.02)
CREAT SERPL-MCNC: 1.32 MG/DL (ref 0.55–1.02)
CREAT SERPL-MCNC: 1.33 MG/DL (ref 0.55–1.02)
CREAT SERPL-MCNC: 1.37 MG/DL (ref 0.55–1.02)
EST. AVERAGE GLUCOSE BLD GHB EST-MCNC: 301 MG/DL
GLUCOSE BLD STRIP.AUTO-MCNC: 108 MG/DL (ref 65–100)
GLUCOSE BLD STRIP.AUTO-MCNC: 111 MG/DL (ref 65–100)
GLUCOSE BLD STRIP.AUTO-MCNC: 119 MG/DL (ref 65–100)
GLUCOSE BLD STRIP.AUTO-MCNC: 126 MG/DL (ref 65–100)
GLUCOSE BLD STRIP.AUTO-MCNC: 138 MG/DL (ref 65–100)
GLUCOSE BLD STRIP.AUTO-MCNC: 140 MG/DL (ref 65–100)
GLUCOSE BLD STRIP.AUTO-MCNC: 164 MG/DL (ref 65–100)
GLUCOSE BLD STRIP.AUTO-MCNC: 172 MG/DL (ref 65–100)
GLUCOSE BLD STRIP.AUTO-MCNC: 211 MG/DL (ref 65–100)
GLUCOSE BLD STRIP.AUTO-MCNC: 256 MG/DL (ref 65–100)
GLUCOSE BLD STRIP.AUTO-MCNC: 309 MG/DL (ref 65–100)
GLUCOSE BLD STRIP.AUTO-MCNC: 73 MG/DL (ref 65–100)
GLUCOSE BLD STRIP.AUTO-MCNC: 79 MG/DL (ref 65–100)
GLUCOSE BLD STRIP.AUTO-MCNC: 83 MG/DL (ref 65–100)
GLUCOSE BLD STRIP.AUTO-MCNC: 84 MG/DL (ref 65–100)
GLUCOSE BLD STRIP.AUTO-MCNC: 87 MG/DL (ref 65–100)
GLUCOSE BLD STRIP.AUTO-MCNC: 89 MG/DL (ref 65–100)
GLUCOSE BLD STRIP.AUTO-MCNC: 93 MG/DL (ref 65–100)
GLUCOSE BLD STRIP.AUTO-MCNC: 93 MG/DL (ref 65–100)
GLUCOSE BLD STRIP.AUTO-MCNC: 98 MG/DL (ref 65–100)
GLUCOSE BLD STRIP.AUTO-MCNC: 99 MG/DL (ref 65–100)
GLUCOSE SERPL-MCNC: 114 MG/DL (ref 65–100)
GLUCOSE SERPL-MCNC: 148 MG/DL (ref 65–100)
GLUCOSE SERPL-MCNC: 289 MG/DL (ref 65–100)
GLUCOSE SERPL-MCNC: 77 MG/DL (ref 65–100)
GLUCOSE SERPL-MCNC: 98 MG/DL (ref 65–100)
HBA1C MFR BLD: 12.1 % (ref 4–5.6)
LACTATE SERPL-SCNC: 1.5 MMOL/L (ref 0.4–2)
MAGNESIUM SERPL-MCNC: 2.1 MG/DL (ref 1.6–2.4)
MAGNESIUM SERPL-MCNC: 2.2 MG/DL (ref 1.6–2.4)
POTASSIUM SERPL-SCNC: 3.7 MMOL/L (ref 3.5–5.1)
POTASSIUM SERPL-SCNC: 3.8 MMOL/L (ref 3.5–5.1)
POTASSIUM SERPL-SCNC: 3.8 MMOL/L (ref 3.5–5.1)
POTASSIUM SERPL-SCNC: 4.1 MMOL/L (ref 3.5–5.1)
POTASSIUM SERPL-SCNC: 4.1 MMOL/L (ref 3.5–5.1)
SERVICE CMNT-IMP: ABNORMAL
SERVICE CMNT-IMP: NORMAL
SODIUM SERPL-SCNC: 141 MMOL/L (ref 136–145)
SODIUM SERPL-SCNC: 143 MMOL/L (ref 136–145)
SODIUM SERPL-SCNC: 144 MMOL/L (ref 136–145)
SODIUM SERPL-SCNC: 145 MMOL/L (ref 136–145)
SODIUM SERPL-SCNC: 145 MMOL/L (ref 136–145)

## 2020-02-20 PROCEDURE — 83605 ASSAY OF LACTIC ACID: CPT

## 2020-02-20 PROCEDURE — 83735 ASSAY OF MAGNESIUM: CPT

## 2020-02-20 PROCEDURE — 74011000258 HC RX REV CODE- 258: Performed by: INTERNAL MEDICINE

## 2020-02-20 PROCEDURE — 3331090002 HH PPS REVENUE DEBIT

## 2020-02-20 PROCEDURE — 74011636637 HC RX REV CODE- 636/637: Performed by: HOSPITALIST

## 2020-02-20 PROCEDURE — 74011636637 HC RX REV CODE- 636/637: Performed by: INTERNAL MEDICINE

## 2020-02-20 PROCEDURE — 3331090001 HH PPS REVENUE CREDIT

## 2020-02-20 PROCEDURE — 65660000000 HC RM CCU STEPDOWN

## 2020-02-20 PROCEDURE — 74011000250 HC RX REV CODE- 250: Performed by: INTERNAL MEDICINE

## 2020-02-20 PROCEDURE — 36415 COLL VENOUS BLD VENIPUNCTURE: CPT

## 2020-02-20 PROCEDURE — 82962 GLUCOSE BLOOD TEST: CPT

## 2020-02-20 PROCEDURE — 83036 HEMOGLOBIN GLYCOSYLATED A1C: CPT

## 2020-02-20 PROCEDURE — 74011250636 HC RX REV CODE- 250/636: Performed by: INTERNAL MEDICINE

## 2020-02-20 PROCEDURE — 74011250637 HC RX REV CODE- 250/637: Performed by: INTERNAL MEDICINE

## 2020-02-20 PROCEDURE — 80048 BASIC METABOLIC PNL TOTAL CA: CPT

## 2020-02-20 PROCEDURE — 74011250636 HC RX REV CODE- 250/636: Performed by: FAMILY MEDICINE

## 2020-02-20 RX ORDER — DEXTROSE 50 % IN WATER (D50W) INTRAVENOUS SYRINGE
12.5-25 AS NEEDED
Status: DISCONTINUED | OUTPATIENT
Start: 2020-02-20 | End: 2020-02-21 | Stop reason: HOSPADM

## 2020-02-20 RX ORDER — INSULIN LISPRO 100 [IU]/ML
INJECTION, SOLUTION INTRAVENOUS; SUBCUTANEOUS
Status: DISCONTINUED | OUTPATIENT
Start: 2020-02-20 | End: 2020-02-20

## 2020-02-20 RX ORDER — INSULIN GLARGINE 100 [IU]/ML
10 INJECTION, SOLUTION SUBCUTANEOUS
Status: DISCONTINUED | OUTPATIENT
Start: 2020-02-20 | End: 2020-02-20 | Stop reason: ALTCHOICE

## 2020-02-20 RX ORDER — INSULIN LISPRO 100 [IU]/ML
INJECTION, SOLUTION INTRAVENOUS; SUBCUTANEOUS
Status: DISCONTINUED | OUTPATIENT
Start: 2020-02-20 | End: 2020-02-20 | Stop reason: ALTCHOICE

## 2020-02-20 RX ORDER — INSULIN LISPRO 100 [IU]/ML
INJECTION, SOLUTION INTRAVENOUS; SUBCUTANEOUS
Status: DISCONTINUED | OUTPATIENT
Start: 2020-02-21 | End: 2020-02-21 | Stop reason: HOSPADM

## 2020-02-20 RX ORDER — MAGNESIUM SULFATE 100 %
4 CRYSTALS MISCELLANEOUS AS NEEDED
Status: DISCONTINUED | OUTPATIENT
Start: 2020-02-20 | End: 2020-02-21 | Stop reason: HOSPADM

## 2020-02-20 RX ORDER — INSULIN GLARGINE 100 [IU]/ML
18 INJECTION, SOLUTION SUBCUTANEOUS
Status: DISCONTINUED | OUTPATIENT
Start: 2020-02-20 | End: 2020-02-20

## 2020-02-20 RX ORDER — HEPARIN SODIUM 5000 [USP'U]/ML
5000 INJECTION, SOLUTION INTRAVENOUS; SUBCUTANEOUS EVERY 12 HOURS
Status: DISCONTINUED | OUTPATIENT
Start: 2020-02-20 | End: 2020-02-21 | Stop reason: HOSPADM

## 2020-02-20 RX ORDER — INSULIN GLARGINE 100 [IU]/ML
20 INJECTION, SOLUTION SUBCUTANEOUS DAILY
Status: DISCONTINUED | OUTPATIENT
Start: 2020-02-20 | End: 2020-02-20 | Stop reason: ALTCHOICE

## 2020-02-20 RX ORDER — INSULIN GLARGINE 100 [IU]/ML
16 INJECTION, SOLUTION SUBCUTANEOUS
Status: DISCONTINUED | OUTPATIENT
Start: 2020-02-20 | End: 2020-02-21 | Stop reason: HOSPADM

## 2020-02-20 RX ORDER — INSULIN LISPRO 100 [IU]/ML
INJECTION, SOLUTION INTRAVENOUS; SUBCUTANEOUS ONCE
Status: COMPLETED | OUTPATIENT
Start: 2020-02-20 | End: 2020-02-20

## 2020-02-20 RX ADMIN — SODIUM CHLORIDE 0.3 UNITS/HR: 9 INJECTION, SOLUTION INTRAVENOUS at 04:33

## 2020-02-20 RX ADMIN — HEPARIN SODIUM 5000 UNITS: 5000 INJECTION INTRAVENOUS; SUBCUTANEOUS at 23:00

## 2020-02-20 RX ADMIN — METOPROLOL SUCCINATE 25 MG: 25 TABLET, EXTENDED RELEASE ORAL at 08:34

## 2020-02-20 RX ADMIN — DEXTROSE MONOHYDRATE 11 ML: 25 INJECTION, SOLUTION INTRAVENOUS at 21:17

## 2020-02-20 RX ADMIN — LEVOTHYROXINE SODIUM 175 MCG: 150 TABLET ORAL at 06:25

## 2020-02-20 RX ADMIN — INSULIN LISPRO 2 UNITS: 100 INJECTION, SOLUTION INTRAVENOUS; SUBCUTANEOUS at 09:48

## 2020-02-20 RX ADMIN — SODIUM CHLORIDE 5 UNITS/HR: 9 INJECTION, SOLUTION INTRAVENOUS at 13:31

## 2020-02-20 RX ADMIN — Medication 10 ML: at 06:25

## 2020-02-20 RX ADMIN — INSULIN GLARGINE 16 UNITS: 100 INJECTION, SOLUTION SUBCUTANEOUS at 23:01

## 2020-02-20 RX ADMIN — SODIUM CHLORIDE 1.2 UNITS/HR: 9 INJECTION, SOLUTION INTRAVENOUS at 01:05

## 2020-02-20 RX ADMIN — DEXTROSE MONOHYDRATE 8 G: 25 INJECTION, SOLUTION INTRAVENOUS at 02:00

## 2020-02-20 RX ADMIN — SODIUM CHLORIDE 1.2 UNITS/HR: 9 INJECTION, SOLUTION INTRAVENOUS at 02:12

## 2020-02-20 RX ADMIN — DEXTROSE MONOHYDRATE, SODIUM CHLORIDE, AND POTASSIUM CHLORIDE 125 ML/HR: 50; 4.5; 1.49 INJECTION, SOLUTION INTRAVENOUS at 09:49

## 2020-02-20 RX ADMIN — SODIUM CHLORIDE 1.5 UNITS/HR: 9 INJECTION, SOLUTION INTRAVENOUS at 00:02

## 2020-02-20 RX ADMIN — SODIUM CHLORIDE 0.6 UNITS/HR: 9 INJECTION, SOLUTION INTRAVENOUS at 03:18

## 2020-02-20 RX ADMIN — SODIUM CHLORIDE AND POTASSIUM CHLORIDE: 4.5; 1.49 INJECTION, SOLUTION INTRAVENOUS at 08:29

## 2020-02-20 RX ADMIN — DEXTROSE MONOHYDRATE, SODIUM CHLORIDE, AND POTASSIUM CHLORIDE 125 ML/HR: 50; 4.5; 1.49 INJECTION, SOLUTION INTRAVENOUS at 18:33

## 2020-02-20 RX ADMIN — INSULIN LISPRO 5 UNITS: 100 INJECTION, SOLUTION INTRAVENOUS; SUBCUTANEOUS at 12:45

## 2020-02-20 RX ADMIN — CLOPIDOGREL BISULFATE 75 MG: 75 TABLET ORAL at 08:34

## 2020-02-20 RX ADMIN — Medication 10 ML: at 23:01

## 2020-02-20 RX ADMIN — DEXTROSE MONOHYDRATE, SODIUM CHLORIDE, AND POTASSIUM CHLORIDE 125 ML/HR: 50; 4.5; 1.49 INJECTION, SOLUTION INTRAVENOUS at 00:09

## 2020-02-20 RX ADMIN — INSULIN GLARGINE 20 UNITS: 100 INJECTION, SOLUTION SUBCUTANEOUS at 08:34

## 2020-02-20 RX ADMIN — INSULIN LISPRO 2 UNITS: 100 INJECTION, SOLUTION INTRAVENOUS; SUBCUTANEOUS at 17:16

## 2020-02-20 RX ADMIN — AMLODIPINE BESYLATE 10 MG: 5 TABLET ORAL at 08:34

## 2020-02-20 RX ADMIN — HEPARIN SODIUM 5000 UNITS: 5000 INJECTION INTRAVENOUS; SUBCUTANEOUS at 09:48

## 2020-02-20 RX ADMIN — Medication 10 ML: at 14:34

## 2020-02-20 NOTE — PROGRESS NOTES
TELE-HOSPITALIST CROSS COVER NOTE:    Visit Vitals  /48   Pulse 82   Temp 97.9 °F (36.6 °C)   Resp 18   Ht 5' 2\" (1.575 m)   Wt 58.1 kg (128 lb 1.4 oz)   SpO2 100%   BMI 23.43 kg/m²         D/W RN; medical records reviewed; CBG < 250; dextrose infusion order placed.       Kesha Galvez

## 2020-02-20 NOTE — PROGRESS NOTES
Reason for Admission:   Patient came to ed for weakness ,lethargy and elevated blood sugar. Per daughter states she has hx dementia. RUR Score:   24%               Resources/supports as identified by patient/family:   She has supportive family. Top Challenges facing patient (as identified by patient/family and CM): Finances/Medication cost?  Daughter denies problems with her obtaining medications. Transportation? Family provides transportation. Support system or lack thereof? She has supportive family. She has two sons and a daughter. Living arrangements? She lives in tri level home with her two sons. One son has special needs and is on disability, he does not work. The other son works. Daughter states patient does not use home oxygen nor cpap machine. Self-care/ADLs/Cognition? Patient has been independent and able to bath and feed herself. Daughter states the son Javad Sahu who has special needs is able to cook and takes care of himself. She states he assists his mother if needed. Current Advanced Directive/Advance Care Plan:  Not on file. Plan for utilizing home health:  She has [facility] in the past.                    Transition of Care Plan:  The plan is for patient to return home with family when medically stable. She will follow up with medical care team when discharged. Verified demographics and pcp with patient's daughter. Marlys Bacon RN BSN CRM        [phone]

## 2020-02-20 NOTE — PROGRESS NOTES
General Daily Progress Note    Admit Date: 2/19/2020    Subjective:     Patient has no complaint--somnolent. Current Facility-Administered Medications   Medication Dose Route Frequency    insulin glargine (LANTUS) injection 20 Units  20 Units SubCUTAneous DAILY    insulin glargine (LANTUS) injection 10 Units  10 Units SubCUTAneous QHS    acetaminophen (TYLENOL) tablet 650 mg  650 mg Oral Q6H PRN    sodium chloride (NS) flush 5-40 mL  5-40 mL IntraVENous Q8H    sodium chloride (NS) flush 5-40 mL  5-40 mL IntraVENous PRN    ondansetron (ZOFRAN) injection 4 mg  4 mg IntraVENous Q4H PRN    heparin (porcine) injection 5,000 Units  5,000 Units SubCUTAneous Q8H    amLODIPine (NORVASC) tablet 10 mg  10 mg Oral DAILY    clopidogreL (PLAVIX) tablet 75 mg  75 mg Oral DAILY    levothyroxine (SYNTHROID) tablet 175 mcg  175 mcg Oral 6am    metoprolol succinate (TOPROL-XL) XL tablet 25 mg  25 mg Oral DAILY    0.45% sodium chloride with KCl 20 mEq/L infusion   IntraVENous CONTINUOUS    dextrose 5% - 0.45% NaCl with KCl 20 mEq/L infusion  125 mL/hr IntraVENous CONTINUOUS        Review of Systems  Review of systems not obtained due to patient factors.     Objective:     Patient Vitals for the past 24 hrs:   BP Temp Pulse Resp SpO2 Height Weight   02/20/20 0834 127/53  85       02/20/20 0347 136/59  88  100 %     02/20/20 0344 139/56 97.9 °F (36.6 °C) 89 16 100 %     02/19/20 2250 139/52 98 °F (36.7 °C) 84 18 100 %     02/19/20 2000 142/48 97.9 °F (36.6 °C) 82 18 100 %     02/19/20 1722 143/47 97.5 °F (36.4 °C) 90 20 100 %     02/19/20 1430 148/43  (!) 105 (!) 34 100 %     02/19/20 1400 155/47  (!) 110 (!) 35 100 %     02/19/20 1330 135/45  (!) 110 (!) 32 100 %     02/19/20 1300 151/44  (!) 110 (!) 34 100 %     02/19/20 1215 147/43  (!) 108 (!) 34 100 %     02/19/20 1156 161/48 99.7 °F (37.6 °C) (!) 112 (!) 31 100 % 5' 2\" (1.575 m) 128 lb 1.4 oz (58.1 kg)     No intake/output data recorded. No intake/output data recorded. Physical Exam:   Visit Vitals  /53   Pulse 85   Temp 97.9 °F (36.6 °C)   Resp 16   Ht 5' 2\" (1.575 m)   Wt 128 lb 1.4 oz (58.1 kg)   SpO2 100%   BMI 23.43 kg/m²     General appearance: alert, cooperative, no distress, appears stated age  Neck: supple, symmetrical, trachea midline, no adenopathy, thyroid: not enlarged, symmetric, no tenderness/mass/nodules, no carotid bruit and no JVD  Lungs: clear to auscultation bilaterally  Heart: regular rate and rhythm, S1, S2 normal, no murmur, click, rub or gallop  Abdomen: soft, non-tender. Bowel sounds normal. No masses,  no organomegaly  Extremities: extremities normal, atraumatic, no cyanosis or edema        Data Review   Recent Results (from the past 24 hour(s))   GLUCOSE, POC    Collection Time: 02/19/20 11:57 AM   Result Value Ref Range    Glucose (POC) >600 (HH) 65 - 100 mg/dL    Performed by [employer]    GLUCOSE, POC    Collection Time: 02/19/20 11:58 AM   Result Value Ref Range    Glucose (POC) >600 (HH) 65 - 100 mg/dL    Performed by [employer]    CBC WITH AUTOMATED DIFF    Collection Time: 02/19/20 12:02 PM   Result Value Ref Range    WBC 11.9 (H) 3.6 - 11.0 K/uL    RBC 4.20 3.80 - 5.20 M/uL    HGB 13.4 11.5 - 16.0 g/dL    HCT 41.6 35.0 - 47.0 %    MCV 99.0 80.0 - 99.0 FL    MCH 31.9 26.0 - 34.0 PG    MCHC 32.2 30.0 - 36.5 g/dL    RDW 13.2 11.5 - 14.5 %    PLATELET 942 421 - 735 K/uL    MPV 11.2 8.9 - 12.9 FL    NRBC 0.0 0  WBC    ABSOLUTE NRBC 0.00 0.00 - 0.01 K/uL    NEUTROPHILS 83 (H) 32 - 75 %    LYMPHOCYTES 11 (L) 12 - 49 %    MONOCYTES 5 5 - 13 %    EOSINOPHILS 0 0 - 7 %    BASOPHILS 0 0 - 1 %    IMMATURE GRANULOCYTES 1 (H) 0.0 - 0.5 %    ABS. NEUTROPHILS 9.9 (H) 1.8 - 8.0 K/UL    ABS. LYMPHOCYTES 1.3 0.8 - 3.5 K/UL    ABS. MONOCYTES 0.5 0.0 - 1.0 K/UL    ABS. EOSINOPHILS 0.0 0.0 - 0.4 K/UL    ABS. BASOPHILS 0.0 0.0 - 0.1 K/UL    ABS. IMM.  GRANS. 0.1 (H) 0.00 - 0.04 K/UL    DF AUTOMATED METABOLIC PANEL, COMPREHENSIVE    Collection Time: 02/19/20 12:02 PM   Result Value Ref Range    Sodium 130 (L) 136 - 145 mmol/L    Potassium 6.1 (H) 3.5 - 5.1 mmol/L    Chloride 94 (L) 97 - 108 mmol/L    CO2 <5 (LL) 21 - 32 mmol/L    Anion gap Cannot be calculated 5 - 15 mmol/L    Glucose 918 (HH) 65 - 100 mg/dL    BUN 34 (H) 6 - 20 MG/DL    Creatinine 2.08 (H) 0.55 - 1.02 MG/DL    BUN/Creatinine ratio 16 12 - 20      GFR est AA 28 (L) >60 ml/min/1.73m2    GFR est non-AA 23 (L) >60 ml/min/1.73m2    Calcium 9.4 8.5 - 10.1 MG/DL    Bilirubin, total 0.6 0.2 - 1.0 MG/DL    ALT (SGPT) 57 12 - 78 U/L    AST (SGOT) 25 15 - 37 U/L    Alk.  phosphatase 183 (H) 45 - 117 U/L    Protein, total 7.8 6.4 - 8.2 g/dL    Albumin 4.0 3.5 - 5.0 g/dL    Globulin 3.8 2.0 - 4.0 g/dL    A-G Ratio 1.1 1.1 - 2.2     MAGNESIUM    Collection Time: 02/19/20 12:02 PM   Result Value Ref Range    Magnesium 2.5 (H) 1.6 - 2.4 mg/dL   PHOSPHORUS    Collection Time: 02/19/20 12:02 PM   Result Value Ref Range    Phosphorus 8.3 (H) 2.6 - 4.7 MG/DL   HEMOGLOBIN A1C WITH EAG    Collection Time: 02/19/20 12:02 PM   Result Value Ref Range    Hemoglobin A1c 11.8 (H) 4.0 - 5.6 %    Est. average glucose 292 mg/dL   EKG, 12 LEAD, INITIAL    Collection Time: 02/19/20 12:11 PM   Result Value Ref Range    Ventricular Rate 109 BPM    Atrial Rate 109 BPM    P-R Interval 146 ms    QRS Duration 90 ms    Q-T Interval 358 ms    QTC Calculation (Bezet) 482 ms    Calculated P Axis 55 degrees    Calculated R Axis -57 degrees    Calculated T Axis 46 degrees    Diagnosis       Sinus tachycardia  Possible Left atrial enlargement  Left axis deviation  Nonspecific ST abnormality  When compared with ECG of 25-DEC-2019 15:00,  premature ventricular complexes are no longer present  Confirmed by Maddison Slade ([id number]) on 2/19/2020 7:06:01 PM     GLUCOSE, POC    Collection Time: 02/19/20  1:14 PM   Result Value Ref Range    Glucose (POC) >600 (HH) 65 - 100 mg/dL    Performed by Agnes RETANA    GLUCOSE, POC    Collection Time: 02/19/20  1:15 PM   Result Value Ref Range    Glucose (POC) >600 (HH) 65 - 100 mg/dL    Performed by Agnes RETANA    GLUCOSE, POC    Collection Time: 02/19/20  2:32 PM   Result Value Ref Range    Glucose (POC) >600 (HH) 65 - 100 mg/dL    Performed by Daryn CANTRELL    GLUCOSE, POC    Collection Time: 02/19/20  2:35 PM   Result Value Ref Range    Glucose (POC) >600 (HH) 65 - 100 mg/dL    Performed by Daryn CANTRELL    POC EG7    Collection Time: 02/19/20  3:06 PM   Result Value Ref Range    Calcium, ionized (POC) 1.26 1.12 - 1.32 mmol/L    pH (POC) 7.014 (LL) 7.35 - 7.45      pCO2 (POC) <15.0 (L) 35.0 - 45.0 MMHG    pO2 (POC) 141 (H) 80 - 100 MMHG    Site OTHER      Device: ROOM AIR      Allens test (POC) N/A      Specimen type (POC) VENOUS BLOOD      Total resp.  rate 33     GLUCOSE, POC    Collection Time: 02/19/20  3:22 PM   Result Value Ref Range    Glucose (POC) >600 (HH) 65 - 100 mg/dL    Performed by Daryn CANTRELL    GLUCOSE, POC    Collection Time: 02/19/20  3:24 PM   Result Value Ref Range    Glucose (POC) >600 (HH) 65 - 100 mg/dL    Performed by Daryn CANTRELL    GLUCOSE, POC    Collection Time: 02/19/20  4:30 PM   Result Value Ref Range    Glucose (POC) >600 (HH) 65 - 100 mg/dL    Performed by Agnes RETANA    GLUCOSE, POC    Collection Time: 02/19/20  4:32 PM   Result Value Ref Range    Glucose (POC) >600 (HH) 65 - 100 mg/dL    Performed by Agnes Cheek MSN    METABOLIC PANEL, BASIC    Collection Time: 02/19/20  4:44 PM   Result Value Ref Range    Sodium 137 136 - 145 mmol/L    Potassium 4.0 3.5 - 5.1 mmol/L    Chloride 105 97 - 108 mmol/L    CO2 5 (LL) 21 - 32 mmol/L    Anion gap 27 (H) 5 - 15 mmol/L    Glucose 625 (HH) 65 - 100 mg/dL    BUN 37 (H) 6 - 20 MG/DL    Creatinine 2.08 (H) 0.55 - 1.02 MG/DL    BUN/Creatinine ratio 18 12 - 20      GFR est AA 28 (L) >60 ml/min/1.73m2    GFR est non-AA 23 (L) >60 ml/min/1.73m2    Calcium 8.4 (L) 8.5 - 10.1 MG/DL   MAGNESIUM    Collection Time: 02/19/20  4:44 PM   Result Value Ref Range    Magnesium 2.5 (H) 1.6 - 2.4 mg/dL   PHOSPHORUS    Collection Time: 02/19/20  4:44 PM   Result Value Ref Range    Phosphorus 4.7 2.6 - 4.7 MG/DL   CULTURE, BLOOD, PAIRED    Collection Time: 02/19/20  4:44 PM   Result Value Ref Range    Special Requests: NO SPECIAL REQUESTS      Culture result: NO GROWTH AFTER 14 HOURS     LACTIC ACID    Collection Time: 02/19/20  4:44 PM   Result Value Ref Range    Lactic acid 5.7 (HH) 0.4 - 2.0 MMOL/L   GLUCOSE, POC    Collection Time: 02/19/20  5:33 PM   Result Value Ref Range    Glucose (POC) 488 (H) 65 - 100 mg/dL    Performed by Chase Mccarthy)    GLUCOSE, POC    Collection Time: 02/19/20  6:37 PM   Result Value Ref Range    Glucose (POC) 391 (H) 65 - 100 mg/dL    Performed by Chase Mccarthy)    GLUCOSE, POC    Collection Time: 02/19/20  7:49 PM   Result Value Ref Range    Glucose (POC) 326 (H) 65 - 100 mg/dL    Performed by [employer]     GLUCOSE, POC    Collection Time: 02/19/20  8:48 PM   Result Value Ref Range    Glucose (POC) 264 (H) 65 - 100 mg/dL    Performed by Jakub Zelaya PCT    METABOLIC PANEL, BASIC    Collection Time: 02/19/20  8:54 PM   Result Value Ref Range    Sodium 144 136 - 145 mmol/L    Potassium 3.2 (L) 3.5 - 5.1 mmol/L    Chloride 114 (H) 97 - 108 mmol/L    CO2 15 (LL) 21 - 32 mmol/L    Anion gap 15 5 - 15 mmol/L    Glucose 292 (H) 65 - 100 mg/dL    BUN 32 (H) 6 - 20 MG/DL    Creatinine 1.77 (H) 0.55 - 1.02 MG/DL    BUN/Creatinine ratio 18 12 - 20      GFR est AA 34 (L) >60 ml/min/1.73m2    GFR est non-AA 28 (L) >60 ml/min/1.73m2    Calcium 8.6 8.5 - 10.1 MG/DL   MAGNESIUM    Collection Time: 02/19/20  8:54 PM   Result Value Ref Range    Magnesium 2.3 1.6 - 2.4 mg/dL   GLUCOSE, POC    Collection Time: 02/19/20  9:57 PM   Result Value Ref Range    Glucose (POC) 230 (H) 65 - 100 mg/dL    Performed by Jakub Zelaya PCT    GLUCOSE, POC    Collection Time: 02/19/20 11:00 PM   Result Value Ref Range    Glucose (POC) 163 (H) 65 - 100 mg/dL    Performed by [employer]     GLUCOSE, POC    Collection Time: 02/20/20 12:00 AM   Result Value Ref Range    Glucose (POC) 84 65 - 100 mg/dL    Performed by [employer]     GLUCOSE, POC    Collection Time: 02/20/20  1:04 AM   Result Value Ref Range    Glucose (POC) 83 65 - 100 mg/dL    Performed by [employer]     GLUCOSE, POC    Collection Time: 02/20/20  1:51 AM   Result Value Ref Range    Glucose (POC) 79 65 - 100 mg/dL    Performed by Goodwin Augusta PCT    GLUCOSE, POC    Collection Time: 02/20/20  2:11 AM   Result Value Ref Range    Glucose (POC) 99 65 - 100 mg/dL    Performed by [employer]     METABOLIC PANEL, BASIC    Collection Time: 02/20/20  2:15 AM   Result Value Ref Range    Sodium 145 136 - 145 mmol/L    Potassium 3.7 3.5 - 5.1 mmol/L    Chloride 116 (H) 97 - 108 mmol/L    CO2 21 21 - 32 mmol/L    Anion gap 8 5 - 15 mmol/L    Glucose 77 65 - 100 mg/dL    BUN 29 (H) 6 - 20 MG/DL    Creatinine 1.32 (H) 0.55 - 1.02 MG/DL    BUN/Creatinine ratio 22 (H) 12 - 20      GFR est AA 47 (L) >60 ml/min/1.73m2    GFR est non-AA 39 (L) >60 ml/min/1.73m2    Calcium 8.4 (L) 8.5 - 10.1 MG/DL   MAGNESIUM    Collection Time: 02/20/20  2:15 AM   Result Value Ref Range    Magnesium 2.1 1.6 - 2.4 mg/dL   HEMOGLOBIN A1C WITH EAG    Collection Time: 02/20/20  2:15 AM   Result Value Ref Range    Hemoglobin A1c 12.1 (H) 4.0 - 5.6 %    Est. average glucose 301 mg/dL   LACTIC ACID    Collection Time: 02/20/20  2:15 AM   Result Value Ref Range    Lactic acid 1.5 0.4 - 2.0 MMOL/L   GLUCOSE, POC    Collection Time: 02/20/20  3:16 AM   Result Value Ref Range    Glucose (POC) 89 65 - 100 mg/dL    Performed by [employer]     GLUCOSE, POC    Collection Time: 02/20/20  4:32 AM   Result Value Ref Range    Glucose (POC) 87 65 - 100 mg/dL    Performed by [employer]     GLUCOSE, POC    Collection Time: 02/20/20  5:51 AM   Result Value Ref Range    Glucose (POC) 111 (H) 65 - 100 mg/dL    Performed by Michelle Celis    METABOLIC PANEL, BASIC    Collection Time: 02/20/20  6:37 AM   Result Value Ref Range    Sodium 145 136 - 145 mmol/L    Potassium 4.1 3.5 - 5.1 mmol/L    Chloride 116 (H) 97 - 108 mmol/L    CO2 18 (L) 21 - 32 mmol/L    Anion gap 11 5 - 15 mmol/L    Glucose 114 (H) 65 - 100 mg/dL    BUN 30 (H) 6 - 20 MG/DL    Creatinine 1.24 (H) 0.55 - 1.02 MG/DL    BUN/Creatinine ratio 24 (H) 12 - 20      GFR est AA 51 (L) >60 ml/min/1.73m2    GFR est non-AA 42 (L) >60 ml/min/1.73m2    Calcium 8.6 8.5 - 10.1 MG/DL   MAGNESIUM    Collection Time: 02/20/20  6:37 AM   Result Value Ref Range    Magnesium 2.2 1.6 - 2.4 mg/dL   GLUCOSE, POC    Collection Time: 02/20/20  6:41 AM   Result Value Ref Range    Glucose (POC) 119 (H) 65 - 100 mg/dL    Performed by Shorty Santamaria RN TRV    GLUCOSE, POC    Collection Time: 02/20/20  7:45 AM   Result Value Ref Range    Glucose (POC) 138 (H) 65 - 100 mg/dL    Performed by Matt Borrero (PCT)            Assessment:     Active Problems:    [facility] ([facility]2017)        Plan:     1. Diabetic ketoacidosis. CO2 is now 18. We will transition off of insulin drip. 2.  Continue hydration.

## 2020-02-20 NOTE — ED PROVIDER NOTES
EMERGENCY DEPARTMENT HISTORY AND PHYSICAL EXAM      Date: 2/19/2020  Patient Name: Sebastien Gutierrez    History of Presenting Illness     Chief Complaint   Patient presents with    High Blood Sugar       History Provided By: EMS    HPI: Sebastien Gutierrez, 67 y.o. female presents to the ED with cc of altered mental status. Pt sig hx of IDDM with frequent admissions for DKA. Pt lives in home along with son who she also helps take care of. EMS called today due lethargy. Initially it was felt pt was hypoglycemic. However, glucose was elevated. EMS responded. Pt arrives altered and unable to provide any further hx. There are no other complaints, changes, or physical findings at this time. PCP: Harlin Habermann, MD    No current facility-administered medications on file prior to encounter. Current Outpatient Medications on File Prior to Encounter   Medication Sig Dispense Refill    levothyroxine (SYNTHROID) 175 mcg tablet TAKE 1 TABLET BY MOUTH EVERY DAY 90 Tab 3    clopidogreL (PLAVIX) 75 mg tab TAKE 1 TABLET BY MOUTH EVERY DAY 90 Tab 3    insulin degludec (TRESIBA FLEXTOUCH U-100) 100 unit/mL (3 mL) inpn 16 Units by SubCUTAneous route daily.  amLODIPine (NORVASC) 10 mg tablet TAKE 1 TABLET BY MOUTH EVERY DAY IN THE MORNING 90 Tab 3    pravastatin (PRAVACHOL) 80 mg tablet TAKE 1 TABLET BY MOUTH EVERY DAY 90 Tab 3    metoprolol succinate (TOPROL-XL) 25 mg XL tablet Take 1 Tab by mouth daily. 30 Tab 11    insulin aspart U-100 (NOVOLOG) 100 unit/mL (3 mL) inpn 4 units AC breakfast,lunch, and dinner (Patient taking differently: 4 Units by SubCUTAneous route Before breakfast, lunch, and dinner. 4 units AC breakfast,lunch, and dinner) 1 Adjustable Dose Pre-filled Pen Syringe 11    brimonidine (ALPHAGAN) 0.15 % ophthalmic solution Administer 1 Drop to both eyes two (2) times a day.          Past History     Past Medical History:  Past Medical History:   Diagnosis Date    Diabetes ([address]     Heart failure [facility])     unknown to family    Hypercholesteremia     Hypertension     Stroke ([facility]     Thyroid disease        Past Surgical History:  Past Surgical History:   Procedure Laterality Date    HX GYN      HX HEENT      thyroidectomy    HX HYSTERECTOMY      REMOVAL GALLBLADDER      THYROIDECTOMY         Family History:  Family History   Problem Relation Age of Onset    Diabetes Father     No Known Problems Mother        Social History:  Social History     Tobacco Use    Smoking status: Never Smoker    Smokeless tobacco: Never Used   Substance Use Topics    Alcohol use: No     Comment: been years    Drug use: No       Allergies:  No Known Allergies      Review of Systems   Review of Systems   Unable to perform ROS: Mental status change       Physical Exam   Physical Exam  Vitals signs and nursing note reviewed. Constitutional:       General: She is not in acute distress. Appearance: She is well-developed. She is obese. She is ill-appearing and toxic-appearing. She is not diaphoretic. HENT:      Head: Normocephalic and atraumatic. Mouth/Throat:      Mouth: Mucous membranes are dry. Pharynx: No oropharyngeal exudate. Eyes:      Extraocular Movements: Extraocular movements intact. Conjunctiva/sclera: Conjunctivae normal.      Pupils: Pupils are equal, round, and reactive to light. Neck:      Musculoskeletal: Normal range of motion and neck supple. Vascular: No JVD. Trachea: No tracheal deviation. Cardiovascular:      Rate and Rhythm: Regular rhythm. Tachycardia present. Heart sounds: Normal heart sounds. No murmur. Pulmonary:      Effort: No respiratory distress. Breath sounds: Normal breath sounds. No stridor. No wheezing or rales. Comments: Tachypnea  Chest:      Chest wall: No tenderness. Abdominal:      General: There is no distension. Palpations: Abdomen is soft. Tenderness: There is no abdominal tenderness (no grimacing with palpation). There is no guarding. Musculoskeletal: Normal range of motion. General: No tenderness. Right lower leg: No edema. Left lower leg: No edema. Skin:     General: Skin is warm and dry. Capillary Refill: Capillary refill takes 2 to 3 seconds. Findings: No rash. Neurological:      Mental Status: She is disoriented. Cranial Nerves: No cranial nerve deficit. Comments: No gross motor or sensory deficits    Psychiatric:      Comments: Unable to assess         Diagnostic Study Results     Labs -     Recent Results (from the past 12 hour(s))   GLUCOSE, POC    Collection Time: 02/19/20  2:32 PM   Result Value Ref Range    Glucose (POC) >600 (HH) 65 - 100 mg/dL    Performed by Mimi GONSALVESN    GLUCOSE, POC    Collection Time: 02/19/20  2:35 PM   Result Value Ref Range    Glucose (POC) >600 (HH) 65 - 100 mg/dL    Performed by Mimi GONSALVESN    POC EG7    Collection Time: 02/19/20  3:06 PM   Result Value Ref Range    Calcium, ionized (POC) 1.26 1.12 - 1.32 mmol/L    pH (POC) 7.014 (LL) 7.35 - 7.45      pCO2 (POC) <15.0 (L) 35.0 - 45.0 MMHG    pO2 (POC) 141 (H) 80 - 100 MMHG    Site OTHER      Device: ROOM AIR      Allens test (POC) N/A      Specimen type (POC) VENOUS BLOOD      Total resp.  rate 33     GLUCOSE, POC    Collection Time: 02/19/20  3:22 PM   Result Value Ref Range    Glucose (POC) >600 (HH) 65 - 100 mg/dL    Performed by Mimi GONSALVESN    GLUCOSE, POC    Collection Time: 02/19/20  3:24 PM   Result Value Ref Range    Glucose (POC) >600 (HH) 65 - 100 mg/dL    Performed by Mimi GONSALVESN    GLUCOSE, POC    Collection Time: 02/19/20  4:30 PM   Result Value Ref Range    Glucose (POC) >600 (HH) 65 - 100 mg/dL    Performed by King Moore MSN    GLUCOSE, POC    Collection Time: 02/19/20  4:32 PM   Result Value Ref Range    Glucose (POC) >600 (HH) 65 - 100 mg/dL    Performed by King Moore MSN    METABOLIC PANEL, BASIC    Collection Time: 02/19/20  4:44 PM   Result Value Ref Range    Sodium 137 136 - 145 mmol/L    Potassium 4.0 3.5 - 5.1 mmol/L    Chloride 105 97 - 108 mmol/L    CO2 5 (LL) 21 - 32 mmol/L    Anion gap 27 (H) 5 - 15 mmol/L    Glucose 625 (HH) 65 - 100 mg/dL    BUN 37 (H) 6 - 20 MG/DL    Creatinine 2.08 (H) 0.55 - 1.02 MG/DL    BUN/Creatinine ratio 18 12 - 20      GFR est AA 28 (L) >60 ml/min/1.73m2    GFR est non-AA 23 (L) >60 ml/min/1.73m2    Calcium 8.4 (L) 8.5 - 10.1 MG/DL   MAGNESIUM    Collection Time: 02/19/20  4:44 PM   Result Value Ref Range    Magnesium 2.5 (H) 1.6 - 2.4 mg/dL   PHOSPHORUS    Collection Time: 02/19/20  4:44 PM   Result Value Ref Range    Phosphorus 4.7 2.6 - 4.7 MG/DL   LACTIC ACID    Collection Time: 02/19/20  4:44 PM   Result Value Ref Range    Lactic acid 5.7 (HH) 0.4 - 2.0 MMOL/L   GLUCOSE, POC    Collection Time: 02/19/20  5:33 PM   Result Value Ref Range    Glucose (POC) 488 (H) 65 - 100 mg/dL    Performed by Gracie Nguyen)    GLUCOSE, POC    Collection Time: 02/19/20  6:37 PM   Result Value Ref Range    Glucose (POC) 391 (H) 65 - 100 mg/dL    Performed by Gracie Nguyen)    GLUCOSE, POC    Collection Time: 02/19/20  7:49 PM   Result Value Ref Range    Glucose (POC) 326 (H) 65 - 100 mg/dL    Performed by Michelle     GLUCOSE, POC    Collection Time: 02/19/20  8:48 PM   Result Value Ref Range    Glucose (POC) 264 (H) 65 - 100 mg/dL    Performed by Alyssia Roman PCT    METABOLIC PANEL, BASIC    Collection Time: 02/19/20  8:54 PM   Result Value Ref Range    Sodium 144 136 - 145 mmol/L    Potassium 3.2 (L) 3.5 - 5.1 mmol/L    Chloride 114 (H) 97 - 108 mmol/L    CO2 15 (LL) 21 - 32 mmol/L    Anion gap 15 5 - 15 mmol/L    Glucose 292 (H) 65 - 100 mg/dL    BUN 32 (H) 6 - 20 MG/DL    Creatinine 1.77 (H) 0.55 - 1.02 MG/DL    BUN/Creatinine ratio 18 12 - 20      GFR est AA 34 (L) >60 ml/min/1.73m2    GFR est non-AA 28 (L) >60 ml/min/1.73m2    Calcium 8.6 8.5 - 10.1 MG/DL   MAGNESIUM    Collection Time: 02/19/20  8:54 PM   Result Value Ref Range    Magnesium 2.3 1.6 - 2.4 mg/dL   GLUCOSE, POC    Collection Time: 02/19/20  9:57 PM   Result Value Ref Range    Glucose (POC) 230 (H) 65 - 100 mg/dL    Performed by Fred Reynosok PCT    GLUCOSE, POC    Collection Time: 02/19/20 11:00 PM   Result Value Ref Range    Glucose (POC) 163 (H) 65 - 100 mg/dL    Performed by Unkown     GLUCOSE, POC    Collection Time: 02/20/20  1:04 AM   Result Value Ref Range    Glucose (POC) 83 65 - 100 mg/dL    Performed by Unkown         Radiologic Studies -   CT ABD PELV WO CONT   Final Result   IMPRESSION: No Acute Disease. XR CHEST PORT   Final Result   IMPRESSION: Normal chest.        CT Results  (Last 48 hours)               02/19/20 1609  CT ABD PELV WO CONT Final result    Impression:  IMPRESSION: No Acute Disease. Narrative:  INDICATION: abd pain        EXAM: CT Abdomen and Pelvis without IV contrast. No oral contrast.   CT dose reduction was achieved through use of a standardized protocol tailored   for this examination and automatic exposure control for dose modulation. There   is patient motion artifact. FINDINGS:    No urinary tract stones are seen. There is no hydroureteronephrosis. The kidneys   are normal in size. There is no perirenal fluid or ascites. Liver shows no apparent significant finding without contrast. Pancreas, adrenal   glands, spleen and aorta show no significant enlargement. No inflammation is   seen. There is no pneumoperitoneum or significant adenopathy. The bladder is unremarkable. The distal ureters are not dilated. There is no   apparent pelvic mass. The appendix is not seen. Bowels are not dilated. No   significant change since 12/1/2019.                CXR Results  (Last 48 hours)               02/19/20 1311  XR CHEST PORT Final result    Impression:  IMPRESSION: Normal chest.       Narrative:  EXAM: XR CHEST PORT       INDICATION: Altered mental status, hyperglycemia       COMPARISON: 12/25/2019       FINDINGS: A portable AP radiograph of the chest was obtained at 1237 hours. The   patient is on a cardiac monitor. The lungs are clear. The cardiac and   mediastinal contours and pulmonary vascularity are normal.  The bones and soft   tissues are grossly within normal limits. Medical Decision Making   I am the first provider for this patient. I reviewed the vital signs, available nursing notes, past medical history, past surgical history, family history and social history. Vital Signs-Reviewed the patient's vital signs. Patient Vitals for the past 12 hrs:   Temp Pulse Resp BP SpO2   02/19/20 2250 98 °F (36.7 °C) 84 18 139/52 100 %   02/19/20 2000 97.9 °F (36.6 °C) 82 18 142/48 100 %   02/19/20 1722 97.5 °F (36.4 °C) 90 20 143/47 100 %   02/19/20 1430  (!) 105 (!) 34 148/43 100 %   02/19/20 1400  (!) 110 (!) 35 155/47 100 %       EKG interpretation: (Preliminary)  Sinus tach, rate 109, left axis, normal qrs, NSST, Maggi Galeazzi, DO      Records Reviewed: Nursing Notes, Old Medical Records, Previous electrocardiograms, Ambulance Run Sheet, Previous Radiology Studies and Previous Laboratory Studies    Provider Notes (Medical Decision Making):   DDx- DKA, HHS, dehydration, acute renal failure, electrolyte abnormality    ED Course:   Initial assessment performed. The patients presenting problems have been evaluated, likely DKA< with Acute metabolic encephalopathy. Prior to official lab results pt was given IV fluid bolus. Pt started on insulin drip, given very high glucose, 918, with DKA, VBG, pH 7.014, will hold Bicarb. Consult Note:   Case discussed with hospitalist will see ad evaluate for admission.      Critical Care Time:   CRITICAL CARE NOTE :    IMPENDING DETERIORATION -Cardiovascular, CNS, Metabolic and Renal  ASSOCIATED RISK FACTORS - Dysrhythmia, Metabolic changes, Dehydration and CNS Decompensation  MANAGEMENT- Bedside Assessment and Supervision of Care  INTERPRETATION -  Xrays, ECG, Blood Pressure, Cardiac Output Measures  and Labs  INTERVENTIONS - hemodynamic mngmt and Metobolic interventions  CASE REVIEW - Hospitalist and Nursing  TREATMENT RESPONSE -Improved  PERFORMED BY - Self    NOTES   :  I have spent 40 minutes of critical care time involved in lab review, consultations with specialist, family decision- making, bedside attention and documentation. During this entire length of time I was immediately available to the patient . Chip Anderson DO    Disposition:  Admit, glucose stabilizer ordered    PLAN:  1. Admit    Diagnosis     Clinical Impression:   1. Diabetic ketoacidosis without coma associated with other specified diabetes mellitus ([facility] [address]    2. Acute renal failure  3. Acute metabolic encephalopathy    Attestations:    Chip Anderson DO    Please note that this dictation was completed with [employer], the computer voice recognition software. Quite often unanticipated grammatical, syntax, homophones, and other interpretive errors are inadvertently transcribed by the computer software. Please disregard these errors. Please excuse any errors that have escaped final proofreading. Thank you.

## 2020-02-20 NOTE — PROGRESS NOTES
Re: Insulin Lantus and SSI ([employer] approved dose change has been made on this patient)    Dr. Trudy Butler has now prescribed an insulin drip; per protocol, Dcd all other insulin orders      Thanks,  Taj Gary, [address]

## 2020-02-20 NOTE — PROGRESS NOTES
VTE Prophylaxis Monitoring     Adjusted heparin from 5000 units SQ q8h to 5000 units SQ q12h due to patient's weight < 60 kg per pharmacy protocol. Thanks,  [employer] CIO, Pharm. D

## 2020-02-20 NOTE — PROGRESS NOTES
1722- TRANSFER - IN REPORT:    Verbal report received from Breonna Estrada RN(name) on Kayla Conklin  being received from ED(unit) for routine progression of care      Report consisted of patients Situation, Background, Assessment and   Recommendations(SBAR). Information from the following report(s) SBAR, Kardex, Intake/Output, MAR and Recent Results was reviewed with the receiving nurse. Opportunity for questions and clarification was provided. Assessment completed upon patients arrival to unit and care assumed. Pt arrived to unit. Alert, in no distress. No needs at this time. 1745- Critical results from Lab. Ramesh Plants RN notified Dr. Latricia Baez.

## 2020-02-20 NOTE — PROGRESS NOTES
Notified Doctor Adeel Pedro about the sugar under 250 to add d5. I also let him know the improving Co2 and K. Put in orders for d5 1/2 ns k 20 for 125 ml.

## 2020-02-21 ENCOUNTER — PATIENT OUTREACH (OUTPATIENT)
Dept: INTERNAL MEDICINE CLINIC | Age: 80
End: 2020-02-21

## 2020-02-21 VITALS
TEMPERATURE: 98.2 F | RESPIRATION RATE: 18 BRPM | HEART RATE: 79 BPM | BODY MASS INDEX: 23.57 KG/M2 | OXYGEN SATURATION: 100 % | WEIGHT: 128.09 LBS | HEIGHT: 62 IN | DIASTOLIC BLOOD PRESSURE: 63 MMHG | SYSTOLIC BLOOD PRESSURE: 142 MMHG

## 2020-02-21 LAB
ANION GAP SERPL CALC-SCNC: 5 MMOL/L (ref 5–15)
ANION GAP SERPL CALC-SCNC: 6 MMOL/L (ref 5–15)
BASOPHILS # BLD: 0 K/UL (ref 0–0.1)
BASOPHILS NFR BLD: 0 % (ref 0–1)
BLASTS NFR BLD MANUAL: 0 %
BUN SERPL-MCNC: 19 MG/DL (ref 6–20)
BUN SERPL-MCNC: 22 MG/DL (ref 6–20)
BUN/CREAT SERPL: 19 (ref 12–20)
BUN/CREAT SERPL: 20 (ref 12–20)
CALCIUM SERPL-MCNC: 8.1 MG/DL (ref 8.5–10.1)
CALCIUM SERPL-MCNC: 8.2 MG/DL (ref 8.5–10.1)
CHLORIDE SERPL-SCNC: 118 MMOL/L (ref 97–108)
CHLORIDE SERPL-SCNC: 118 MMOL/L (ref 97–108)
CO2 SERPL-SCNC: 20 MMOL/L (ref 21–32)
CO2 SERPL-SCNC: 21 MMOL/L (ref 21–32)
CREAT SERPL-MCNC: 1 MG/DL (ref 0.55–1.02)
CREAT SERPL-MCNC: 1.09 MG/DL (ref 0.55–1.02)
DIFFERENTIAL METHOD BLD: ABNORMAL
EOSINOPHIL # BLD: 0 K/UL (ref 0–0.4)
EOSINOPHIL NFR BLD: 0 % (ref 0–7)
ERYTHROCYTE [DISTWIDTH] IN BLOOD BY AUTOMATED COUNT: 13.2 % (ref 11.5–14.5)
GLUCOSE BLD STRIP.AUTO-MCNC: 111 MG/DL (ref 65–100)
GLUCOSE BLD STRIP.AUTO-MCNC: 168 MG/DL (ref 65–100)
GLUCOSE BLD STRIP.AUTO-MCNC: 62 MG/DL (ref 65–100)
GLUCOSE SERPL-MCNC: 125 MG/DL (ref 65–100)
GLUCOSE SERPL-MCNC: 154 MG/DL (ref 65–100)
HCT VFR BLD AUTO: 36.3 % (ref 35–47)
HGB BLD-MCNC: 12.5 G/DL (ref 11.5–16)
IMM GRANULOCYTES # BLD AUTO: 0 K/UL
IMM GRANULOCYTES NFR BLD AUTO: 0 %
LYMPHOCYTES # BLD: 1.5 K/UL (ref 0.8–3.5)
LYMPHOCYTES NFR BLD: 17 % (ref 12–49)
MCH RBC QN AUTO: 31.7 PG (ref 26–34)
MCHC RBC AUTO-ENTMCNC: 34.4 G/DL (ref 30–36.5)
MCV RBC AUTO: 92.1 FL (ref 80–99)
METAMYELOCYTES NFR BLD MANUAL: 0 %
MONOCYTES # BLD: 0.6 K/UL (ref 0–1)
MONOCYTES NFR BLD: 7 % (ref 5–13)
MYELOCYTES NFR BLD MANUAL: 0 %
NEUTS BAND NFR BLD MANUAL: 0 % (ref 0–6)
NEUTS SEG # BLD: 6.7 K/UL (ref 1.8–8)
NEUTS SEG NFR BLD: 76 % (ref 32–75)
NRBC # BLD: 0.02 K/UL (ref 0–0.01)
NRBC BLD-RTO: 0.2 PER 100 WBC
OTHER CELLS NFR BLD MANUAL: 0 %
PLATELET # BLD AUTO: 171 K/UL (ref 150–400)
PMV BLD AUTO: 10.5 FL (ref 8.9–12.9)
POTASSIUM SERPL-SCNC: 3.3 MMOL/L (ref 3.5–5.1)
POTASSIUM SERPL-SCNC: 3.7 MMOL/L (ref 3.5–5.1)
PROMYELOCYTES NFR BLD MANUAL: 0 %
RBC # BLD AUTO: 3.94 M/UL (ref 3.8–5.2)
RBC MORPH BLD: ABNORMAL
SERVICE CMNT-IMP: ABNORMAL
SODIUM SERPL-SCNC: 143 MMOL/L (ref 136–145)
SODIUM SERPL-SCNC: 145 MMOL/L (ref 136–145)
WBC # BLD AUTO: 8.8 K/UL (ref 3.6–11)

## 2020-02-21 PROCEDURE — 74011000250 HC RX REV CODE- 250: Performed by: INTERNAL MEDICINE

## 2020-02-21 PROCEDURE — 74011250636 HC RX REV CODE- 250/636: Performed by: INTERNAL MEDICINE

## 2020-02-21 PROCEDURE — 36415 COLL VENOUS BLD VENIPUNCTURE: CPT

## 2020-02-21 PROCEDURE — 80048 BASIC METABOLIC PNL TOTAL CA: CPT

## 2020-02-21 PROCEDURE — 3331090002 HH PPS REVENUE DEBIT

## 2020-02-21 PROCEDURE — 3331090001 HH PPS REVENUE CREDIT

## 2020-02-21 PROCEDURE — 74011250637 HC RX REV CODE- 250/637: Performed by: INTERNAL MEDICINE

## 2020-02-21 PROCEDURE — 82962 GLUCOSE BLOOD TEST: CPT

## 2020-02-21 PROCEDURE — 85027 COMPLETE CBC AUTOMATED: CPT

## 2020-02-21 RX ORDER — POTASSIUM CHLORIDE AND SODIUM CHLORIDE 450; 150 MG/100ML; MG/100ML
INJECTION, SOLUTION INTRAVENOUS CONTINUOUS
Status: DISCONTINUED | OUTPATIENT
Start: 2020-02-21 | End: 2020-02-21

## 2020-02-21 RX ADMIN — DEXTROSE MONOHYDRATE 25 G: 25 INJECTION, SOLUTION INTRAVENOUS at 04:15

## 2020-02-21 RX ADMIN — METOPROLOL SUCCINATE 25 MG: 25 TABLET, EXTENDED RELEASE ORAL at 08:05

## 2020-02-21 RX ADMIN — Medication 10 ML: at 06:30

## 2020-02-21 RX ADMIN — CLOPIDOGREL BISULFATE 75 MG: 75 TABLET ORAL at 08:05

## 2020-02-21 RX ADMIN — AMLODIPINE BESYLATE 10 MG: 5 TABLET ORAL at 08:05

## 2020-02-21 RX ADMIN — LEVOTHYROXINE SODIUM 175 MCG: 150 TABLET ORAL at 06:30

## 2020-02-21 RX ADMIN — SODIUM CHLORIDE AND POTASSIUM CHLORIDE: 4.5; 1.49 INJECTION, SOLUTION INTRAVENOUS at 05:07

## 2020-02-21 NOTE — PROGRESS NOTES
ALEKSANDAR PLAN--Home with family. Patient is being discharged home with family today. Patient states her son, Jody Valdez is coming to transport her home. Called patient's daughter and made her aware of discharge and she will also call Jody Valdez to let him know. Marlys Bacon RN BSN CRM        [phone]

## 2020-02-21 NOTE — DISCHARGE INSTRUCTIONS
General Discharge Instructions    Patient ID:  Kit Hodges  [id number]  67 y.o.  1940    Patient Instructions          The following personal items were collected during your admission and were returned to you. Take Home Medications             What to do at Home    Recommended diet: Diabetic Diet    Recommended activity: Activity as tolerated    Follow-up with Margarita Rolle MD  in 3 days. Information obtained by :  I understand that if any problems occur once I am at home I am to contact my physician. I understand and acknowledge receipt of the instructions indicated above.                                                                                                                                            Physician's or R.N.'s Signature                                                                  Date/Time                                                                                                                                              Patient or Representative Signature                                                          Date/Time

## 2020-02-21 NOTE — PROGRESS NOTES
0700:  Report received from VIN Carver. 26:  Dr. Kareem Whitneyws in to see patient. 1145:  Reviewed discharge instructions with patient. No prescriptions were given. Patient verbalizes understanding. Patient to go to discharge lobby via wheelchair.

## 2020-02-21 NOTE — PROGRESS NOTES
Goals Addressed                 This Visit's Progress     Supportive resources: medicaid assistance and options for caregiver support          2/20/20  CT SW was notified of patient's admission to [facility]. SW left a message with pt's  Roseann Bryant requesting a uniform assessment screening for personal care/LTC services. SW will attempt contact at a later date if call is not returned. Daughter was also made aware of the UAI request.      2/18/20  SW discussed with daughter about the purpose and benefit of receiving Diabetes education from [address]. Daughter stated that she understands how to manage pt's medical condition, but the challenge is ensuring that pt eat her meals and take medications consistently. Daughter stated that she calls her mom several times during the day to remind her to take meds, eat meals etc.  SW discussed the option of patient attending an adult  program during the week to assist with needs until her personal care is established. Daughter stated that pt would not be interested in attending but she will share this with pt as an option. YOLANDA contacted  DSS to iniate the UAI screening for personal care. SW advised daughter to contact Mrs. Kindra Stoll at [address] [facility] [phone] to provide additional information for screening. [place] [address] Ambulatory Care Coordination Team  [phone]    2/17/20  YOLANDA contacted daughter for f/u. Daughter reported that she/pt submitted the medicaid application. SW explained to daughter the f/u process and services offered though medicaid. SW/daughter also discussed the benefit of having pt's medications bubbled pack and advised her to contact pharmacy to inquire about the process. SW will f/u with Milka Hernandez RD to coordinate a meeting for pt/daughter to receive diabetes education. SW will f/u at a later date.   Marysol Bundy [facility]  Care Transitions Team   [address] [facility] Ambulatory Care Coordination Team  [phone]  1/31/2020   YOLANDA attempted to contacted patient's daughter to follow up on progress made with completing medicaid application and offer assistance. SW left a voicemail requesting a call back. [place] [address] Ambulatory Care Coordination Team  [phone]    1/15/2020   MARLEN GUERRERO received a call back from [place] [phone] providing an update on patients home visit. YOLANDA met with patient in the home and spoke with the daughter via phone during the visit. Ari Barr reported there were no concerns observed during the visit. Ari Barr reported that patient was alert, verbal and oriented during the visit, as she appropriately shared information and answered questions. However, It is noted that [facility] created goals and interventions to address patients cognitive impairment. Ari Barr discussed with daughter the importance of having Medicaid coverage in place, as it could provide personal care aides to assist patient in the home. YOLANDA explained to daughter the concerns that were shared regarding patients care in the home and offered to schedule a date and time to meet with her to complete a Medicaid application and submit a request for UAI screening, but daughter was not able to confirm a date. ALMA DELIA GUERRERO will follow up with daughter at a later date to schedule meeting. See [facility] YOLANDA note. [place] [address] Ambulatory Care Coordination Team  [phone]    1/10/2020 3:00pm  MARLEN GUERRERO received a call back from [facility] YOLANDA, Dawit Crenshaw. MARLEN GUERRERO updated her on the concerns related to patient's care and safety in the home. 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 According to patients daughter, patient owns her home and the son lives with her. Patients daughter is involved with her care but does not live with her.  SW made aware of the resources that were provided to daughter regarding the Medicaid process and discussed the possibility of patient participating in [facility] chronic care program for disease management.  SW is scheduled to conduct a home visit with patient next week. Due to patients cognition limitations, SW suggested that [facility] SW contact patients son/daughter to schedule a home visit. CTN SW mentioned, pending the outcome of home assessment there may be a need for an APS referral.  SW acknowledged the report given and will follow up with CTN SW at a later date to discuss findings. [place] [address] Ambulatory Care Coordination Team  [phone]    1/9/2020 2:30am  [address], reported that patient is scheduled to receive a home visit from [address] [place] [address] on 1-13-20. CTN SW called home health SW, Mattie Tony requesting a call back to discuss concerns reg patient's care/safety and address social needs. SW will attempt call at a later date if call is not returned. [place] [address] Ambulatory Care Coordination Team  [phone]    1/7/2020  1:00pm  It is noted that patient was recently discharged from the hospital due to DKA. SW contacted patient's daughter, Laverne Banks, to discuss resources to address patient's needs. Daughter stated that she was busy, but was receptive to taking a few minutes to discuss options related to patient's care. SW explained to daughter the Medicaid process along with the benefits of Medicaid coverage. SW highlighted a few Medicaid services that patient could receive if approved, such as personal care, adult  coverage, assistance with medication cost and alternative placement if needed. Daughter acknowledged the services and stated that her goal is to care for patient in the home. Patient's son live with her and daughter assist with providing care. SW advised daughter to contact [address] [place] [address] to initiate the application process, as it could take up to 45 days for Medicaid approval.  Daughter understood and stated she and patient will complete the application by the end of the week. Daughter denied any additional resources at this time. SW provided daughter my contact information to contact if needed      SW will contact daughter within the next two weeks to assess progress made with completing application, discuss ACP items and offer assistance if needed. [facility] Team   [facility] Ambulatory Care Coordination Team  [phone]     12/17/2019  1:30pm  SW attempted to contact patient's daughter for follow up. SW left a detailed voicemail requesting a call back, and suggested that she leave on my voicemail best time/date to reach her. SW will follow up at a later date. 12/13/2019  1:25pm  Patient's daughter requested assistance with understanding medicaid process and initiating application. SW contacted daughter however, she reported that she was unable to talk and stated she would contact SW [phone] at 1:30pm.      Johana Gant will attempt contact on 12-16-19 if call is not returned. YOLANDA will offer assistance with medicaid process and additional resources if needed.         [facility] Team

## 2020-02-21 NOTE — PROGRESS NOTES
1900: Bedside shift change report given to Emmanuel fuentes RN (oncoming nurse) by Wes Eisenberg RN (offgoing nurse). Report included the following information SBAR and Kardex. Tele hospitalist  notified about BS and Drip. Orders placed to end insulin drip.     0700: Bedside shift change report given to [address] Patti Doll RN (oncoming nurse) by Emmanuel fuentes RN (offgoing nurse). Report included the following information SBAR, Kardex and MAR.

## 2020-02-21 NOTE — PROGRESS NOTES
PCP ALEKSANDAR appt scheduled with [facility] to see PT in 24-48 hours after discharge at 9:00am. Appt added to [address] CM Specialist

## 2020-02-21 NOTE — PROGRESS NOTES
0700- Bedside and Verbal shift change report given to cindi Resendiz RN (oncoming nurse) by Sebastian Councilman, RN (offgoing nurse). Report included the following information SBAR, Kardex, Intake/Output, MAR and Recent Results. Pt resting in bed. No needs at this time. 3738- Dr. Nayeli Dyer in room.  at this time, Makeda Coates states to keep drip off. WIll continue to monitor. Dr. Nayeli Dyer wants to kep drip off and bridge off.   0800- Dr. Curtis Denney wants pt to have both fluids running. 1/2NSwith 20k switched to 75mL/Hr. 1000- Sliding scale insulin d/c order for new one received. 1226- Noted that C02 was 16, paged Dr. Nayeli Dyer orders to put pt back on insulin drip for acidosis and keep -140 and to d/c 1/2NS with 20K and keep other fluid. 1600- Order to change pt sliding scale to carb coverage while on insulin drip. 1800- pt had BM noted that it has some red streaks and loose/mucousy. Dr. Nayeli Dyer notified and c.diff precautions ordered. No order for H/H received.

## 2020-02-21 NOTE — PROGRESS NOTES
Appointment Information  The following appointments have been successfully scheduled:    Date/time Thursday, February 27, 2020 01:15 PM  Patient Taye Console [id number] ([birth year]MN F) #[id number]  Department [facility]-MAIN OFFICE-PCP  Appointment type Any  Provider Gracie Licea

## 2020-02-22 PROCEDURE — 3331090002 HH PPS REVENUE DEBIT

## 2020-02-22 PROCEDURE — 3331090001 HH PPS REVENUE CREDIT

## 2020-02-22 NOTE — DISCHARGE SUMMARY
[address] SUMMARY    Name:  Maureen Valverde  MR#:  [id number]  :  1940  ACCOUNT #:  [de-identified]  ADMIT DATE:  2020  DISCHARGE DATE:  2020    HISTORY OF PRESENT ILLNESS:  The patient is a 67-year-old lady with type 1B diabetes presented to the emergency room because of increasing fatigue, weakness, and confusion. She was evaluated and found to be in diabetic ketoacidosis. Her diabetic control is horrible. This was primarily related to her progressive dementia to the point where she cannot remember things that she needs to do as well as a lack of family support. In any event, there appeared to no obvious factor precipitating the DKA. Past medical history, social history, review of systems, family history, and physical examination are as in admitting H and P.    LABORATORY VALUES:  Initial hemoglobin was 13.4, white count 11.9, MCV was 99, platelet count was 140,326 with the following differential, 83 segs, 11 lymphocytes, 5 monocytes, and 1 immature granulocyte. On 2020, white count was 8.8 with the hemoglobin of 12.5. Abnormalities on the comprehensive profile on admission revealed a potassium of 6.1, blood sugar of 918, BUN and creatinine of 34 and 2.08 respectively with alkaline phosphatase of 183. Her hemoglobin A1c was 11.8. At the time of discharge, BUN and creatinine were 19 and 1.0 with a CO2 of 21. Blood culture, no growth. CT scan of the abdomen, negative. Chest x-ray, negative. HOSPITAL COURSE:  The patient was admitted and placed on an insulin drip with the usual accompanying protocol. Her CO2 progressively increased from 5 up to 18. Unfortunately, her insulin drip was discontinued. I, therefore, hope that she had broken the ketoacidosis and could continue to improve with the use of her usual insulin regimen.   This was not the case and within 6 hours, the CO2 actually dropped to 16 prompting her to be placed back on the insulin drip. She remained on this for approximately 15 hours, which was adequate enough to get her CO2 up to 22. From that point on blood sugars were quite reasonable. Hydration continued to the point where there was complete correction of the pre-renal status. Mental issues back to her baseline. At the time of discharge, there were no residual problems that existed. FINAL DIAGNOSES:  1. Diabetic ketoacidosis. 2.  Diabetes mellitus type 1B. 3.  Progressive dementia. 4.  Primary hypertension. 5.  Hypothyroidism. 6.  Dyslipidemia. DISPOSITION:  1. The patient will be discharged home ambulatory on an ADA diet with bedtime snack. 2.  The patient remains a full code. 3.  Discharge medications include the following:  Alphagan 0.15% one drop both eyes b.i.d., NovoLog 4 units AC meals, Tresiba 16 units every morning, pravastatin 80 mg at bedtime, amlodipine 10 mg every morning, clopidogrel 75 mg daily, Levoxyl 0.175 mg daily, metoprolol succinate 25 mg daily. 4.  I have attempted to get the patient a continued glucose monitor specifically Dickey which has been okayed by her [employer], but for some strange reason she does not have it and I have not been able to communicate with the family. Additionally, she was supposed to return to the office on a q. 2 weekly basis which she has not done. I have spoken with the family repetitively about the things that need to be done to preclude repetitive hospitalizations for her DKA, but they obviously have not been done. Hopefully, things will indeed improve. 5.  The patient will return to the office in the next 3-4 days.       MD KENNETH Garvey/[email or web address]/[email or web address]  D:  02/21/2020 8:42  T:  02/22/2020 3:10  JOB #:  [id number]

## 2020-02-23 PROCEDURE — 3331090001 HH PPS REVENUE CREDIT

## 2020-02-23 PROCEDURE — 3331090002 HH PPS REVENUE DEBIT

## 2020-02-24 ENCOUNTER — PATIENT OUTREACH (OUTPATIENT)
Dept: INTERNAL MEDICINE CLINIC | Age: 80
End: 2020-02-24

## 2020-02-24 LAB
BACTERIA SPEC CULT: NORMAL
SERVICE CMNT-IMP: NORMAL

## 2020-02-24 PROCEDURE — 3331090002 HH PPS REVENUE DEBIT

## 2020-02-24 PROCEDURE — 3331090001 HH PPS REVENUE CREDIT

## 2020-02-24 NOTE — PROGRESS NOTES
Hospital Discharge Follow-Up      Date/Time:  2020 3:10 PM  Rowena Larkin RN, [facility]  Care Transitions Nurse   [phone]    Patient was admitted to [facility] on 20 and discharged on 20 for DKA. The physician discharge summary was available at the time of outreach. Patient's daughter was contacted within 1 business days of discharge. Top Challenges reviewed with the provider     - Dallas Quiñonez had been arranged by hospital for visit over weekend.  was unable to reach pt on  or 20 likely because they were calling pt's phone instead of daughter.     - Confirmed daughter will be bringing pt to appt with Dr. Isiah Kitchen on 20    - [facility] with [facility] staff- pt is already assigned a [employer] CM that is having trouble reaching pt as well. Unfortunately, neither pt's daughter or son are on the consent form for [employer] to talk to them. - CTN Jacqueline PACHECO is working with pt's daughter to get her to initiate UAI with [address] [place] [address]:   Does patient have an Advance Directive:  not on file        Method of communication with provider :chart routing    Was this a readmission? No, however this is patient's 5 admission for DKA since 2019 (8 months)    Care Transition Nurse (CTN) contacted the pt's daughter, Sharon Enamorado by telephone to perform post hospital discharge assessment. Verified name and  with family as identifiers. Provided introduction to self, and explanation of the CTN role. Family received hospital discharge instructions. CTN reviewed discharge instructions and red flags with family who verbalized understanding. Family given an opportunity to ask questions and does not have any further questions or concerns at this time. The family agrees to contact the PCP office for questions related to their healthcare. CTN provided contact information for future reference.     Disease Specific: N/A    Patients top risk factors for readmission:  financial, functional cognitive ability, lack of knowledge about disease, level of motivation, medical condition, medication management, polypharmacy, transportation    Home Health orders at discharge: unk- called [facility] to inquire about resumption- no return call  [address]: [facility]  Date of initial visit: unk    Durable Medical Equipment ordered at discharge: none    Medication(s):   New Medications at Discharge: none  Changed Medications at Discharge: none  Discontinued Medications at Discharge: none    Medication reconciliation was reviewed with family, who verbalizes understanding of administration of home medications. There were no barriers to obtaining medications identified at this time. Referral to Pharm D needed: already previously requested     Current Outpatient Medications   Medication Sig    levothyroxine (SYNTHROID) 175 mcg tablet TAKE 1 TABLET BY MOUTH EVERY DAY    clopidogreL (PLAVIX) 75 mg tab TAKE 1 TABLET BY MOUTH EVERY DAY    insulin degludec (TRESIBA FLEXTOUCH U-100) 100 unit/mL (3 mL) inpn 16 Units by SubCUTAneous route daily.  amLODIPine (NORVASC) 10 mg tablet TAKE 1 TABLET BY MOUTH EVERY DAY IN THE MORNING    pravastatin (PRAVACHOL) 80 mg tablet TAKE 1 TABLET BY MOUTH EVERY DAY    metoprolol succinate (TOPROL-XL) 25 mg XL tablet Take 1 Tab by mouth daily.  insulin aspart U-100 (NOVOLOG) 100 unit/mL (3 mL) inpn 4 units AC breakfast,lunch, and dinner (Patient taking differently: 4 Units by SubCUTAneous route Before breakfast, lunch, and dinner. 4 units AC breakfast,lunch, and dinner)    brimonidine (ALPHAGAN) 0.15 % ophthalmic solution Administer 1 Drop to both eyes two (2) times a day. No current facility-administered medications for this visit. There are no discontinued medications.     [facility] follow up appointment(s):   Future Appointments   Date Time Provider Tawnya Evans   2/25/2020 To Be Determined Ifeanyi Kasper   2/27/2020  1:15 PM Hernando Enrique MD [facility]   3/3/2020 To Be Determined MORELIA Richardson Dinorah Aparicio   3/3/2020  4:30 PM Hernando Enrique MD [facility]   7/8/2020  1:15 PM Woody Cueto MD [address] [facility] [facility]      Non-[facility] follow up appointment(s): none    [facility]:  scheduled but  unable to reach patient x 48 hours, so no visit occured       Goals        Chronic Disease     Prepare patients and caregivers for end of life decisions (ie. need for hospice, pain management, symptom relief, advance directives etc.)        12/13/2019  1:30pm  Patient does not have an Advance Medical Directive on file. She has her daughter, Bret Wynne listed as her primary healthcare decision maker and, her two sons Saroj John and Ramesh Both as second and third contacts. SW will review and discuss the importance of having a completed AMD on file and offer assistance with completing one if interested. [facility] Transitions Team            Post Hospitalization     Supportive resources: medicaid assistance and options for caregiver support        2/21/20  CT SW contacted patient's daughter and advised her to contact Liseth TONY to initiate Uniform Assessment Instrument screening for personal care/LTC assistance. Daughter reported that she submitted a medicaid application. Daughter in agreement with receiving assistance from PharmD for Diabetes education/tx options for patient. SW will update Siobhan ByrneD of this request.  Mario Tolbert will follow up at a later date to inquire about progress and offer assistance if needed. [place]  Ambulatory Care Coordination Team  [phone]    2/20/20  CT YOLANDA was notified of patient's admission to [facility].   YOLANDA left a message with pt's  Faith Melvin requesting a uniform assessment screening for personal care/LTC services. SW will attempt contact at a later date if call is not returned. Daughter was also made aware of the UAI request.      2/18/20  YOLANDA discussed with daughter about the purpose and benefit of receiving Diabetes education from Roger EDDY Avinash Cristiane. Daughter stated that she understands how to manage pt's medical condition, but the challenge is ensuring that pt eat her meals and take medications consistently. Daughter stated that she calls her mom several times during the day to remind her to take meds, eat meals etc.  SW discussed the option of patient attending an adult  program during the week to assist with needs until her personal care is established. Daughter stated that pt would not be interested in attending but she will share this with pt as an option. YOLANDA contacted  DSS to iniate the UAI screening for personal care. SW advised daughter to contact Mrs. Chandni Roberto at [facility] [phone] to provide additional information for screening. [place] [address] Ambulatory Care Coordination Team  [phone]    2/17/20  YOLANDA contacted daughter for f/u. Daughter reported that she/pt submitted the medicaid application. SW explained to daughter the f/u process and services offered though medicaid. SW/daughter also discussed the benefit of having pt's medications bubbled pack and advised her to contact pharmacy to inquire about the process. YOLANDA will f/u with Merlyn Larose RD to coordinate a meeting for pt/daughter to receive diabetes education. SW will f/u at a later date. [place] [address] Ambulatory Care Coordination Team  [phone]  1/31/2020   YLOANDA attempted to contacted patient's daughter to follow up on progress made with completing medicaid application and offer assistance. SW left a voicemail requesting a call back.   Paul English [facility] Ambulatory Care Coordination Team  [phone]    1/15/2020   MARLEN GUERRERO received a call back from [facility] Basia Rolon providing an update on patients home visit. YOLANDA met with patient in the home and spoke with the daughter via phone during the visit. Michael reported there were no concerns observed during the visit. Michael reported that patient was alert, verbal and oriented during the visit, as she appropriately shared information and answered questions. However, It is noted that [facility] created goals and interventions to address patients cognitive impairment. Michael discussed with daughter the importance of having Medicaid coverage in place, as it could provide personal care aides to assist patient in the home. YOLANDA explained to daughter the concerns that were shared regarding patients care in the home and offered to schedule a date and time to meet with her to complete a Medicaid application and submit a request for UAI screening, but daughter was not able to confirm a date. ALMA DELIA GUERRERO will follow up with daughter at a later date to schedule meeting. See [facility] YOLANDA note. [place] [address] Ambulatory Care Coordination Team  [phone]    1/10/2020 3:00pm  MARLEN GUERRERO received a call back from [facility] YOLANDA, Shital Keen. CTSATNAM GUERRERO updated her on the concerns related to patient's care and safety in the home. 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 According to patients daughter, patient owns her home and the son lives with her. Patients daughter is involved with her care but does not live with her.      ALMA DELIA GUERRERO made aware of the resources that were provided to daughter regarding the Medicaid process and discussed the possibility of patient participating in [facility] chronic care program for disease management. HH SW is scheduled to conduct a home visit with patient next week. Due to patients cognition limitations, SW suggested that [facility] SW contact patients son/daughter to schedule a home visit. CTN SW mentioned, pending the outcome of home assessment there may be a need for an APS referral.  SW acknowledged the report given and will follow up with CTN SW at a later date to discuss findings. [place] [address] Ambulatory Care Coordination Team  [phone]    1/9/2020 2:30am  [address] [facility], reported that patient is scheduled to receive a home visit from [address] [place] [address] on 1-13-20. CTN SW called home health SW, Darin Mcclendonmoses requesting a call back to discuss concerns reg patient's care/safety and address social needs. SW will attempt call at a later date if call is not returned. [place] [address] Ambulatory Care Coordination Team  [phone]    1/7/2020  1:00pm  It is noted that patient was recently discharged from the hospital due to DKA. SW contacted patient's daughter, Christiana Jorgensen, to discuss resources to address patient's needs. Daughter stated that she was busy, but was receptive to taking a few minutes to discuss options related to patient's care. SW explained to daughter the Medicaid process along with the benefits of Medicaid coverage. SW highlighted a few Medicaid services that patient could receive if approved, such as personal care, adult  coverage, assistance with medication cost and alternative placement if needed. Daughter acknowledged the services and stated that her goal is to care for patient in the home. Patient's son live with her and daughter assist with providing care.  SW advised daughter to contact [address] [place] [address] to initiate the application process, as it could take up to 45 days for [place] approval.  Daughter understood and stated she and patient will complete the application by the end of the week. Daughter denied any additional resources at this time. SW provided daughter my contact information to contact if needed      SW will contact daughter within the next two weeks to assess progress made with completing application, discuss ACP items and offer assistance if needed. [facility] Team   [address] [place] [address] Ambulatory Care Coordination Team  [phone]     12/17/2019  1:30pm  SW attempted to contact patient's daughter for follow up. SW left a detailed voicemail requesting a call back, and suggested that she leave on my voicemail best time/date to reach her. SW will follow up at a later date. 12/13/2019  1:25pm  Patient's daughter requested assistance with understanding medicaid process and initiating application. SW contacted daughter however, she reported that she was unable to talk and stated she would contact SW [phone] at 1:30pm.      Antoinette Jones will attempt contact on 12-16-19 if call is not returned. YOLANDA will offer assistance with medicaid process and additional resources if needed.         [facility] Team

## 2020-02-24 NOTE — PROGRESS NOTES
 Supportive resources: medicaid assistance and options for caregiver support        2/21/20  CT YOLANDA contacted patient's daughter and advised her to contact [place] [facility] to initiate Uniform Assessment Instrument screening for personal care/LTC assistance. Daughter reported that she submitted a medicaid application. Daughter in agreement with receiving assistance from PharmD for Diabetes education/tx options for patient. SW will update Julio César Castro PharmD of this request.  Susie Olmos will follow up at a later date to inquire about progress and offer assistance if needed. [place] [address] Ambulatory Care Coordination Team  [phone]    2/20/20  CT SW was notified of patient's admission to [address] [place] [facility] beds at the [facility]. SW left a message with pt's  Nury Mcbride requesting a uniform assessment screening for personal care/LTC services. SW will attempt contact at a later date if call is not returned. Daughter was also made aware of the UAI request.      2/18/20  SW discussed with daughter about the purpose and benefit of receiving Diabetes education from Dre0 E Avinash Sauer. Daughter stated that she understands how to manage pt's medical condition, but the challenge is ensuring that pt eat her meals and take medications consistently. Daughter stated that she calls her mom several times during the day to remind her to take meds, eat meals etc.  SW discussed the option of patient attending an adult  program during the week to assist with needs until her personal care is established. Daughter stated that pt would not be interested in attending but she will share this with pt as an option. SW contacted  DSS to iniate the UAI screening for personal care. SW advised daughter to contact Mrs. Kel Zamarripa at [facility] [phone] to provide additional information for screening.       Tiffanie Batista [facility] Team  [phone]    2/17/20  YOLANDA contacted daughter for f/u. Daughter reported that she/pt submitted the medicaid application. SW explained to daughter the f/u process and services offered though medicaid. SW/daughter also discussed the benefit of having pt's medications bubbled pack and advised her to contact pharmacy to inquire about the process. SW will f/u with Ky Landau RD to coordinate a meeting for pt/daughter to receive diabetes education. SW will f/u at a later date. [place] [address] Ambulatory Care Coordination Team  [phone]  1/31/2020   YOLANDA attempted to contacted patient's daughter to follow up on progress made with completing medicaid application and offer assistance. SW left a voicemail requesting a call back. [place] [address] Ambulatory Care Coordination Team  [phone]    1/15/2020   CTSATNAM GUERRERO received a call back from [place] [address] providing an update on patients home visit. SW met with patient in the home and spoke with the daughter via phone during the visit. Bertha Oliver reported there were no concerns observed during the visit. Bertha Oliver reported that patient was alert, verbal and oriented during the visit, as she appropriately shared information and answered questions. However, It is noted that [facility] SN created goals and interventions to address patients cognitive impairment. Bertha Oliver discussed with daughter the importance of having Medicaid coverage in place, as it could provide personal care aides to assist patient in the home. YOLANDA explained to daughter the concerns that were shared regarding patients care in the home and offered to schedule a date and time to meet with her to complete a Medicaid application and submit a request for UAI screening, but daughter was not able to confirm a date.    YOLANDA will follow up with daughter at a later date to schedule meeting. See [facility] SW note. [place] [address] Ambulatory Care Coordination Team  [phone]    1/10/2020 3:00pm  MARLEN GUERRERO received a call back from [facility] Brandee GUERRERO. CTN SW updated her on the concerns related to patient's care and safety in the home. 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 According to patients daughter, patient owns her home and the son lives with her. Patients daughter is involved with her care but does not live with her. ALMA DELIA SW made aware of the resources that were provided to daughter regarding the Medicaid process and discussed the possibility of patient participating in [facility] chronic care program for disease management. ALMA DELIA GUERRERO is scheduled to conduct a home visit with patient next week. Due to patients cognition limitations, SW suggested that [facility] SW contact patients son/daughter to schedule a home visit. MARLEN GUERRERO mentioned, pending the outcome of home assessment there may be a need for an APS referral. ALMA DELIA SW acknowledged the report given and will follow up with MARLEN GUERRERO at a later date to discuss findings. [place] [address] Ambulatory Care Coordination Team  [phone]    1/9/2020 2:30am  [facility] rep, reported that patient is scheduled to receive a home visit from [address] [place] [address] on 1-13-20. MARLEN GUERRERO called home health Brandee GUERRERO requesting a call back to discuss concerns reg patient's care/safety and address social needs. SW will attempt call at a later date if call is not returned.     [place] [address] Ambulatory Care Coordination Team  [phone]    1/7/2020  1:00pm  It is noted that patient was recently discharged from the hospital due to DKA. SW contacted patient's daughter, Howard Van, to discuss resources to address patient's needs. Daughter stated that she was busy, but was receptive to taking a few minutes to discuss options related to patient's care. SW explained to daughter the Medicaid process along with the benefits of Medicaid coverage. SW highlighted a few Medicaid services that patient could receive if approved, such as personal care, adult  coverage, assistance with medication cost and alternative placement if needed. Daughter acknowledged the services and stated that her goal is to care for patient in the home. Patient's son live with her and daughter assist with providing care. SW advised daughter to contact [address] [place] [address] to initiate the application process, as it could take up to 45 days for Medicaid approval.  Daughter understood and stated she and patient will complete the application by the end of the week. Daughter denied any additional resources at this time. SW provided daughter my contact information to contact if needed      SW will contact daughter within the next two weeks to assess progress made with completing application, discuss ACP items and offer assistance if needed. [facility] Transitions Team   [address] [place] [address] Ambulatory Care Coordination Team  [phone]     12/17/2019  1:30pm  SW attempted to contact patient's daughter for follow up. SW left a detailed voicemail requesting a call back, and suggested that she leave on my voicemail best time/date to reach her. SW will follow up at a later date. 12/13/2019  1:25pm  Patient's daughter requested assistance with understanding medicaid process and initiating application.   SW contacted daughter however, she reported that she was unable to talk and stated she would contact SW [phone] at 1:30pm.      Racquel Beaulieu will attempt contact on 12-16-19 if call is not returned. SW will offer assistance with medicaid process and additional resources if needed.         [facility] Transitions Team

## 2020-02-25 ENCOUNTER — PATIENT OUTREACH (OUTPATIENT)
Dept: INTERNAL MEDICINE CLINIC | Age: 80
End: 2020-02-25

## 2020-02-25 ENCOUNTER — HOME CARE VISIT (OUTPATIENT)
Dept: SCHEDULING | Facility: HOME HEALTH | Age: 80
End: 2020-02-25
Payer: MEDICARE

## 2020-02-25 DIAGNOSIS — E11.649 UNCONTROLLED TYPE 2 DIABETES MELLITUS WITH HYPOGLYCEMIA WITHOUT COMA, WITHOUT LONG-TERM CURRENT USE OF INSULIN (HCC): Primary | ICD-10-CM

## 2020-02-25 PROCEDURE — 3331090001 HH PPS REVENUE CREDIT

## 2020-02-25 PROCEDURE — 3331090002 HH PPS REVENUE DEBIT

## 2020-02-25 PROCEDURE — G0300 HHS/HOSPICE OF LPN EA 15 MIN: HCPCS

## 2020-02-25 RX ORDER — BLOOD-GLUCOSE METER
EACH MISCELLANEOUS
Qty: 1 EACH | Refills: 0 | Status: SHIPPED | OUTPATIENT
Start: 2020-02-25 | End: 2020-09-01

## 2020-02-26 ENCOUNTER — PATIENT OUTREACH (OUTPATIENT)
Dept: INTERNAL MEDICINE CLINIC | Age: 80
End: 2020-02-26

## 2020-02-26 PROCEDURE — 3331090001 HH PPS REVENUE CREDIT

## 2020-02-26 PROCEDURE — 3331090002 HH PPS REVENUE DEBIT

## 2020-02-27 ENCOUNTER — OFFICE VISIT (OUTPATIENT)
Dept: INTERNAL MEDICINE CLINIC | Age: 80
End: 2020-02-27

## 2020-02-27 ENCOUNTER — TELEPHONE (OUTPATIENT)
Dept: FAMILY MEDICINE CLINIC | Age: 80
End: 2020-02-27

## 2020-02-27 VITALS
TEMPERATURE: 98.1 F | HEIGHT: 62 IN | WEIGHT: 150.1 LBS | OXYGEN SATURATION: 97 % | SYSTOLIC BLOOD PRESSURE: 156 MMHG | DIASTOLIC BLOOD PRESSURE: 65 MMHG | HEART RATE: 89 BPM | RESPIRATION RATE: 16 BRPM | BODY MASS INDEX: 27.62 KG/M2

## 2020-02-27 DIAGNOSIS — I10 ESSENTIAL HYPERTENSION: ICD-10-CM

## 2020-02-27 DIAGNOSIS — E03.2 HYPOTHYROIDISM DUE TO MEDICATION: ICD-10-CM

## 2020-02-27 DIAGNOSIS — E78.5 DYSLIPIDEMIA: ICD-10-CM

## 2020-02-27 DIAGNOSIS — E10.65 HYPERGLYCEMIA DUE TO TYPE 1 DIABETES MELLITUS (HCC): Primary | ICD-10-CM

## 2020-02-27 DIAGNOSIS — E10.10 DIABETIC KETOACIDOSIS WITHOUT COMA ASSOCIATED WITH TYPE 1 DIABETES MELLITUS (HCC): ICD-10-CM

## 2020-02-27 DIAGNOSIS — F01.50 VASCULAR DEMENTIA WITHOUT BEHAVIORAL DISTURBANCE (HCC): ICD-10-CM

## 2020-02-27 PROBLEM — E11.65 TYPE 2 DIABETES MELLITUS WITH HYPERGLYCEMIA, WITH LONG-TERM CURRENT USE OF INSULIN (HCC): Status: RESOLVED | Noted: 2018-10-21 | Resolved: 2020-02-27

## 2020-02-27 PROBLEM — E11.649 HYPOGLYCEMIA DUE TO TYPE 2 DIABETES MELLITUS (HCC): Status: RESOLVED | Noted: 2019-12-04 | Resolved: 2020-02-27

## 2020-02-27 PROBLEM — Z79.4 TYPE 2 DIABETES MELLITUS WITH HYPERGLYCEMIA, WITH LONG-TERM CURRENT USE OF INSULIN (HCC): Status: RESOLVED | Noted: 2018-10-21 | Resolved: 2020-02-27

## 2020-02-27 PROCEDURE — 3331090002 HH PPS REVENUE DEBIT

## 2020-02-27 PROCEDURE — 3331090001 HH PPS REVENUE CREDIT

## 2020-02-27 RX ORDER — LEVOTHYROXINE SODIUM 175 UG/1
TABLET ORAL
Qty: 90 TAB | Refills: 3 | Status: SHIPPED | OUTPATIENT
Start: 2020-02-27 | End: 2021-04-21 | Stop reason: SDUPTHER

## 2020-02-27 RX ORDER — CLOPIDOGREL BISULFATE 75 MG/1
TABLET ORAL
Qty: 90 TAB | Refills: 3 | Status: SHIPPED | OUTPATIENT
Start: 2020-02-27 | End: 2021-01-26

## 2020-02-27 RX ORDER — METOPROLOL SUCCINATE 25 MG/1
25 TABLET, EXTENDED RELEASE ORAL DAILY
Qty: 90 TAB | Refills: 3 | Status: SHIPPED | OUTPATIENT
Start: 2020-02-27 | End: 2020-05-11

## 2020-02-27 RX ORDER — AMLODIPINE BESYLATE 10 MG/1
TABLET ORAL
Qty: 90 TAB | Refills: 3 | Status: SHIPPED | OUTPATIENT
Start: 2020-02-27 | End: 2020-09-01

## 2020-02-27 RX ORDER — PRAVASTATIN SODIUM 80 MG/1
TABLET ORAL
Qty: 90 TAB | Refills: 3 | Status: SHIPPED | OUTPATIENT
Start: 2020-02-27 | End: 2021-01-26

## 2020-02-27 NOTE — PROGRESS NOTES
Chief Complaint   Patient presents with    Transitions Of Care     Patient admitted to [facility] on 2/19/20 for DKA. 1. Have you been to the ER, urgent care clinic since your last visit? Hospitalized since your last visit? Yes When: 2/19/20 Where: [facility]  Reason for visit: DKA    2. Have you seen or consulted any other health care providers outside of the [address] [place] [address] since your last visit? Include any pap smears or colon screening.  No

## 2020-02-27 NOTE — PROGRESS NOTES
[facility]  [place] [address]  [address] [place] [address]  Phone:  [phone]  Fax: [phone]       Chief Complaint   Patient presents with    Transitions Of Care     Patient admitted to [facility] on 2/19/20 for DKA. .      SUBJECTIVE:    Carloz Pugh is a 67 y.o. female Comes in for return visit having been most recently hospitalized for diabetic ketoacidosis. It is an obvious etiology and is primarily related to her not taking her insulin. She basically forgets. This is secondary to her progressive dementia, which is getting worse. Today her blood sugar is in the 500s. She then tells me she did not take her basal insulin this morning. Her daughter accompanies her today. She has a past history of primary hypertension, dyslipidemia and hypothyroidism. Unfortunately, her dementia is creating a major problem in that the patient is incapable of doing all of the things required for diabetic control. Current Outpatient Medications   Medication Sig Dispense Refill    pravastatin (PRAVACHOL) 80 mg tablet TAKE 1 TABLET BY MOUTH at bedtime 90 Tab 3    metoprolol succinate (TOPROL-XL) 25 mg XL tablet Take 1 Tab by mouth daily. 90 Tab 3    levothyroxine (SYNTHROID) 175 mcg tablet TAKE 1 TABLET BY MOUTH EVERY DAY 90 Tab 3    clopidogreL (PLAVIX) 75 mg tab TAKE 1 TABLET BY MOUTH EVERY DAY 90 Tab 3    amLODIPine (NORVASC) 10 mg tablet TAKE 1 TABLET BY MOUTH EVERY DAY IN THE MORNING 90 Tab 3    flash glucose scanning reader (FREESTYLE KEVON 14 DAY READER) [facility] As directed1 1 Each 5    flash glucose sensor (FREESTYLE KEVON 14 DAY SENSOR) kit Continuous monitoring 2 Kit 11    glucose blood VI test strips (ONETOUCH ULTRA BLUE TEST STRIP) strip USE TO CHECK BLOOD SUGARS DAILY. Dx: E11.649 100 Strip 11    Blood-Glucose Meter (ONETOUCH ULTRA2 METER) misc USE TO CHECK BLOOD SUGARS DAILY.  1 Each 0    insulin degludec (TRESIBA FLEXTOUCH U-100) 100 unit/mL (3 mL) inpn 16 Units by SubCUTAneous route daily.  insulin aspart U-100 (NOVOLOG) 100 unit/mL (3 mL) inpn 4 units AC breakfast,lunch, and dinner (Patient taking differently: 4 Units by SubCUTAneous route Before breakfast, lunch, and dinner. 4 units AC breakfast,lunch, and dinner) 1 Adjustable Dose Pre-filled Pen Syringe 11    brimonidine (ALPHAGAN) 0.15 % ophthalmic solution Administer 1 Drop to both eyes two (2) times a day.        Past Medical History:   Diagnosis Date    Diabetes ([facility]     Heart failure ([facility]     unknown to family    Hypercholesteremia     Hypertension     Stroke ([facility]     Thyroid disease      Past Surgical History:   Procedure Laterality Date    HX GYN      HX HEENT      thyroidectomy    HX HYSTERECTOMY      REMOVAL GALLBLADDER      THYROIDECTOMY       No Known Allergies      REVIEW OF SYSTEMS:  General: negative for - chills or fever  ENT: negative for - headaches, nasal congestion or tinnitus  Respiratory: negative for - cough, hemoptysis, shortness of breath or wheezing  Cardiovascular : negative for - chest pain, edema, palpitations or shortness of breath  Gastrointestinal: negative for - abdominal pain, blood in stools, heartburn or nausea/vomiting  Genito-Urinary: no dysuria, trouble voiding, or hematuria  Musculoskeletal: negative for - gait disturbance, joint pain, joint stiffness or joint swelling  Neurological: no TIA or stroke symptoms  Hematologic: no bruises, no bleeding, no swollen glands  Integument: no lumps, mole changes, nail changes or rash  Endocrine: no malaise/lethargy or unexpected weight changes      Social History     Socioeconomic History    Marital status:      Spouse name: Not on file    Number of children: 1    Years of education: Not on file    Highest education level: Not on file   Occupational History    Occupation: retired   Tobacco Use    Smoking status: Never Smoker    Smokeless tobacco: Never Used   Substance and Sexual Activity    Alcohol use: No     Comment: been years    Drug use: No    Sexual activity: Not Currently     Family History   Problem Relation Age of Onset    Diabetes Father     No Known Problems Mother        OBJECTIVE:    Visit Vitals  /65   Pulse 89   Temp 98.1 °F (36.7 °C) (Oral)   Resp 16   Ht 5' 2\" (1.575 m)   Wt 150 lb 1.6 oz (68.1 kg)   SpO2 97%   BMI 27.45 kg/m²     CONSTITUTIONAL: well , well nourished, appears age appropriate  EYES: perrla, eom intact  ENMT:moist mucous membranes, pharynx clear  NECK: supple. Thyroid normal  RESPIRATORY: Chest: clear to ascultation and percussion   CARDIOVASCULAR: Heart: regular rate and rhythm  GASTROINTESTINAL: Abdomen: soft, bowel sounds active  HEMATOLOGIC: no pathological lymph nodes palpated  MUSCULOSKELETAL: Extremities: no edema, pulse 1+   INTEGUMENT: No unusual rashes or suspicious skin lesions noted. Nails appear normal.  NEUROLOGIC: non-focal exam   MENTAL STATUS: alert and oriented, appropriate affect      ASSESSMENT:  1. Hyperglycemia due to type 1 diabetes mellitus ([address]    2. Diabetic ketoacidosis without coma associated with type 1 diabetes mellitus ([address]    3. Vascular dementia without behavioral disturbance ([address]    4. Hypothyroidism due to medication    5. Essential hypertension    6. Dyslipidemia        PLAN:    1. I suggest to the family that she take [place] full doses today and 12 units of her Humalog insulin. The patient needs supervision as far as taking insulin and eating in a timely fashion. She is incapable of doing this herself. She has a son who is mentally challenged, who is not able to assist in her overall care. The daughter is attempting to get Medicaid so she can have personal care hours done. She even tells her mother that she can come and live with her, but she does not do so. 2. She did indeed have diabetic ketoacidosis and she is a 1B diabetic. 3. As I stated earlier, dementia is getting worse.   There is nothing that can be done to stop this. She needs more assistance. 4. She will continue thyroid supplement as prescribed in view of history of hypothyroidism. 5. BP is excellent today, no adjustments are made. 6. She will continue statin as prescribed in view of increased cardiovascular risk. She is in a primary risk prevention mode. Follow-up and Dispositions    · Return keep old apt.            Chaz De León MD

## 2020-02-27 NOTE — TELEPHONE ENCOUNTER
Referred by MICHAEL GUERRERO for diabetes education / assistance. Called daughter and spoke to her briefly; she was not available to talk. Discussed trying to set up an appointment for me to meet with the patient and her daughter at their convenience. She states she will call back when she is able.   Elan Amaral, PHARMD, CDE

## 2020-02-28 PROCEDURE — 3331090002 HH PPS REVENUE DEBIT

## 2020-02-28 PROCEDURE — 3331090001 HH PPS REVENUE CREDIT

## 2020-02-29 PROCEDURE — 3331090002 HH PPS REVENUE DEBIT

## 2020-02-29 PROCEDURE — 3331090001 HH PPS REVENUE CREDIT

## 2020-03-01 PROCEDURE — 3331090001 HH PPS REVENUE CREDIT

## 2020-03-01 PROCEDURE — 3331090002 HH PPS REVENUE DEBIT

## 2020-03-02 VITALS
RESPIRATION RATE: 18 BRPM | HEART RATE: 78 BPM | SYSTOLIC BLOOD PRESSURE: 142 MMHG | OXYGEN SATURATION: 99 % | DIASTOLIC BLOOD PRESSURE: 78 MMHG | TEMPERATURE: 98.1 F

## 2020-03-02 PROCEDURE — 3331090002 HH PPS REVENUE DEBIT

## 2020-03-02 PROCEDURE — 3331090001 HH PPS REVENUE CREDIT

## 2020-03-03 ENCOUNTER — HOME CARE VISIT (OUTPATIENT)
Dept: HOME HEALTH SERVICES | Facility: HOME HEALTH | Age: 80
End: 2020-03-03
Payer: MEDICARE

## 2020-03-03 PROCEDURE — 3331090002 HH PPS REVENUE DEBIT

## 2020-03-03 PROCEDURE — 3331090001 HH PPS REVENUE CREDIT

## 2020-03-05 ENCOUNTER — PATIENT OUTREACH (OUTPATIENT)
Dept: INTERNAL MEDICINE CLINIC | Age: 80
End: 2020-03-05

## 2020-03-05 ENCOUNTER — TELEPHONE (OUTPATIENT)
Dept: FAMILY MEDICINE CLINIC | Age: 80
End: 2020-03-05

## 2020-03-05 NOTE — TELEPHONE ENCOUNTER
Second attempt to call dter to set up an appt for patient / dter to come in for diabetes education / help with the diabetes, after multiple DKA episodes. I was disconnected from the call after stating my name and why I was calling. Hope that if I can be of assistance to patient / her family and they are willing to discuss, that they can be re referred.    Cristo Vasquez, NICOLED, CDE

## 2020-03-12 ENCOUNTER — HOME CARE VISIT (OUTPATIENT)
Dept: HOME HEALTH SERVICES | Facility: HOME HEALTH | Age: 80
End: 2020-03-12

## 2020-04-23 ENCOUNTER — HOSPITAL ENCOUNTER (INPATIENT)
Age: 80
LOS: 2 days | Discharge: HOME HEALTH | DRG: 639 | End: 2020-04-25
Attending: EMERGENCY MEDICINE | Admitting: HOSPITALIST
Payer: MEDICARE

## 2020-04-23 DIAGNOSIS — E13.10 DIABETIC KETOACIDOSIS WITHOUT COMA ASSOCIATED WITH OTHER SPECIFIED DIABETES MELLITUS (HCC): Primary | ICD-10-CM

## 2020-04-23 LAB
ALBUMIN SERPL-MCNC: 3.2 G/DL (ref 3.5–5)
ALBUMIN/GLOB SERPL: 0.9 {RATIO} (ref 1.1–2.2)
ALP SERPL-CCNC: 222 U/L (ref 45–117)
ALT SERPL-CCNC: 32 U/L (ref 12–78)
AMPHET UR QL SCN: NEGATIVE
ANION GAP SERPL CALC-SCNC: 11 MMOL/L (ref 5–15)
ANION GAP SERPL CALC-SCNC: 15 MMOL/L (ref 5–15)
ANION GAP SERPL CALC-SCNC: 25 MMOL/L (ref 5–15)
ANION GAP SERPL CALC-SCNC: 28 MMOL/L (ref 5–15)
ANION GAP SERPL CALC-SCNC: 8 MMOL/L (ref 5–15)
ANION GAP SERPL CALC-SCNC: ABNORMAL MMOL/L (ref 5–15)
APPEARANCE UR: CLEAR
AST SERPL-CCNC: 23 U/L (ref 15–37)
ATRIAL RATE: 102 BPM
BACTERIA URNS QL MICRO: NEGATIVE /HPF
BARBITURATES UR QL SCN: NEGATIVE
BASOPHILS # BLD: 0 K/UL (ref 0–0.1)
BASOPHILS NFR BLD: 0 % (ref 0–1)
BENZODIAZ UR QL: NEGATIVE
BILIRUB SERPL-MCNC: 0.4 MG/DL (ref 0.2–1)
BILIRUB UR QL: NEGATIVE
BNP SERPL-MCNC: 1934 PG/ML
BUN SERPL-MCNC: 19 MG/DL (ref 6–20)
BUN SERPL-MCNC: 22 MG/DL (ref 6–20)
BUN SERPL-MCNC: 26 MG/DL (ref 6–20)
BUN SERPL-MCNC: 28 MG/DL (ref 6–20)
BUN SERPL-MCNC: 28 MG/DL (ref 6–20)
BUN SERPL-MCNC: 29 MG/DL (ref 6–20)
BUN/CREAT SERPL: 16 (ref 12–20)
BUN/CREAT SERPL: 18 (ref 12–20)
BUN/CREAT SERPL: 18 (ref 12–20)
BUN/CREAT SERPL: 19 (ref 12–20)
CALCIUM SERPL-MCNC: 7.8 MG/DL (ref 8.5–10.1)
CALCIUM SERPL-MCNC: 7.9 MG/DL (ref 8.5–10.1)
CALCIUM SERPL-MCNC: 8.1 MG/DL (ref 8.5–10.1)
CALCIUM SERPL-MCNC: 8.3 MG/DL (ref 8.5–10.1)
CALCULATED P AXIS, ECG09: 48 DEGREES
CALCULATED R AXIS, ECG10: -63 DEGREES
CALCULATED T AXIS, ECG11: 88 DEGREES
CANNABINOIDS UR QL SCN: NEGATIVE
CHLORIDE SERPL-SCNC: 103 MMOL/L (ref 97–108)
CHLORIDE SERPL-SCNC: 107 MMOL/L (ref 97–108)
CHLORIDE SERPL-SCNC: 111 MMOL/L (ref 97–108)
CHLORIDE SERPL-SCNC: 111 MMOL/L (ref 97–108)
CHLORIDE SERPL-SCNC: 113 MMOL/L (ref 97–108)
CHLORIDE SERPL-SCNC: 99 MMOL/L (ref 97–108)
CO2 SERPL-SCNC: 16 MMOL/L (ref 21–32)
CO2 SERPL-SCNC: 19 MMOL/L (ref 21–32)
CO2 SERPL-SCNC: 20 MMOL/L (ref 21–32)
CO2 SERPL-SCNC: 5 MMOL/L (ref 21–32)
CO2 SERPL-SCNC: 7 MMOL/L (ref 21–32)
CO2 SERPL-SCNC: <5 MMOL/L (ref 21–32)
COCAINE UR QL SCN: NEGATIVE
COLOR UR: ABNORMAL
CREAT SERPL-MCNC: 1 MG/DL (ref 0.55–1.02)
CREAT SERPL-MCNC: 1.2 MG/DL (ref 0.55–1.02)
CREAT SERPL-MCNC: 1.46 MG/DL (ref 0.55–1.02)
CREAT SERPL-MCNC: 1.73 MG/DL (ref 0.55–1.02)
CREAT SERPL-MCNC: 1.76 MG/DL (ref 0.55–1.02)
CREAT SERPL-MCNC: 1.83 MG/DL (ref 0.55–1.02)
DIAGNOSIS, 93000: NORMAL
DIFFERENTIAL METHOD BLD: ABNORMAL
DRUG SCRN COMMENT,DRGCM: NORMAL
EOSINOPHIL # BLD: 0 K/UL (ref 0–0.4)
EOSINOPHIL NFR BLD: 0 % (ref 0–7)
EPITH CASTS URNS QL MICRO: ABNORMAL /LPF
ERYTHROCYTE [DISTWIDTH] IN BLOOD BY AUTOMATED COUNT: 16.1 % (ref 11.5–14.5)
GLOBULIN SER CALC-MCNC: 3.5 G/DL (ref 2–4)
GLUCOSE BLD STRIP.AUTO-MCNC: 107 MG/DL (ref 65–100)
GLUCOSE BLD STRIP.AUTO-MCNC: 120 MG/DL (ref 65–100)
GLUCOSE BLD STRIP.AUTO-MCNC: 138 MG/DL (ref 65–100)
GLUCOSE BLD STRIP.AUTO-MCNC: 149 MG/DL (ref 65–100)
GLUCOSE BLD STRIP.AUTO-MCNC: 152 MG/DL (ref 65–100)
GLUCOSE BLD STRIP.AUTO-MCNC: 187 MG/DL (ref 65–100)
GLUCOSE BLD STRIP.AUTO-MCNC: 195 MG/DL (ref 65–100)
GLUCOSE BLD STRIP.AUTO-MCNC: 248 MG/DL (ref 65–100)
GLUCOSE BLD STRIP.AUTO-MCNC: 261 MG/DL (ref 65–100)
GLUCOSE BLD STRIP.AUTO-MCNC: 274 MG/DL (ref 65–100)
GLUCOSE BLD STRIP.AUTO-MCNC: 281 MG/DL (ref 65–100)
GLUCOSE BLD STRIP.AUTO-MCNC: 349 MG/DL (ref 65–100)
GLUCOSE BLD STRIP.AUTO-MCNC: 438 MG/DL (ref 65–100)
GLUCOSE BLD STRIP.AUTO-MCNC: 445 MG/DL (ref 65–100)
GLUCOSE BLD STRIP.AUTO-MCNC: 549 MG/DL (ref 65–100)
GLUCOSE BLD STRIP.AUTO-MCNC: 97 MG/DL (ref 65–100)
GLUCOSE BLD STRIP.AUTO-MCNC: 99 MG/DL (ref 65–100)
GLUCOSE BLD STRIP.AUTO-MCNC: >600 MG/DL (ref 65–100)
GLUCOSE SERPL-MCNC: 105 MG/DL (ref 65–100)
GLUCOSE SERPL-MCNC: 159 MG/DL (ref 65–100)
GLUCOSE SERPL-MCNC: 231 MG/DL (ref 65–100)
GLUCOSE SERPL-MCNC: 454 MG/DL (ref 65–100)
GLUCOSE SERPL-MCNC: 740 MG/DL (ref 65–100)
GLUCOSE SERPL-MCNC: 810 MG/DL (ref 65–100)
GLUCOSE UR STRIP.AUTO-MCNC: >1000 MG/DL
HCT VFR BLD AUTO: 36.8 % (ref 35–47)
HGB BLD-MCNC: 11.1 G/DL (ref 11.5–16)
HGB UR QL STRIP: NEGATIVE
HYALINE CASTS URNS QL MICRO: ABNORMAL /LPF (ref 0–5)
IMM GRANULOCYTES # BLD AUTO: 0.2 K/UL (ref 0–0.04)
IMM GRANULOCYTES NFR BLD AUTO: 2 % (ref 0–0.5)
KETONES UR QL STRIP.AUTO: 80 MG/DL
LACTATE SERPL-SCNC: 1 MMOL/L (ref 0.4–2)
LACTATE SERPL-SCNC: 3.7 MMOL/L (ref 0.4–2)
LACTATE SERPL-SCNC: 4.2 MMOL/L (ref 0.4–2)
LEUKOCYTE ESTERASE UR QL STRIP.AUTO: NEGATIVE
LYMPHOCYTES # BLD: 0.9 K/UL (ref 0.8–3.5)
LYMPHOCYTES NFR BLD: 8 % (ref 12–49)
MAGNESIUM SERPL-MCNC: 2 MG/DL (ref 1.6–2.4)
MAGNESIUM SERPL-MCNC: 2 MG/DL (ref 1.6–2.4)
MAGNESIUM SERPL-MCNC: 2.1 MG/DL (ref 1.6–2.4)
MAGNESIUM SERPL-MCNC: 2.2 MG/DL (ref 1.6–2.4)
MAGNESIUM SERPL-MCNC: 2.4 MG/DL (ref 1.6–2.4)
MCH RBC QN AUTO: 31 PG (ref 26–34)
MCHC RBC AUTO-ENTMCNC: 30.2 G/DL (ref 30–36.5)
MCV RBC AUTO: 102.8 FL (ref 80–99)
METHADONE UR QL: NEGATIVE
MONOCYTES # BLD: 0.8 K/UL (ref 0–1)
MONOCYTES NFR BLD: 7 % (ref 5–13)
NEUTS SEG # BLD: 9.3 K/UL (ref 1.8–8)
NEUTS SEG NFR BLD: 83 % (ref 32–75)
NITRITE UR QL STRIP.AUTO: NEGATIVE
NRBC # BLD: 0 K/UL (ref 0–0.01)
NRBC BLD-RTO: 0 PER 100 WBC
OPIATES UR QL: NEGATIVE
P-R INTERVAL, ECG05: 156 MS
PCP UR QL: NEGATIVE
PH UR STRIP: 5 [PH] (ref 5–8)
PHOSPHATE SERPL-MCNC: 8.3 MG/DL (ref 2.6–4.7)
PLATELET # BLD AUTO: 317 K/UL (ref 150–400)
PMV BLD AUTO: 10.3 FL (ref 8.9–12.9)
POTASSIUM SERPL-SCNC: 3.4 MMOL/L (ref 3.5–5.1)
POTASSIUM SERPL-SCNC: 3.6 MMOL/L (ref 3.5–5.1)
POTASSIUM SERPL-SCNC: 3.7 MMOL/L (ref 3.5–5.1)
POTASSIUM SERPL-SCNC: 3.7 MMOL/L (ref 3.5–5.1)
POTASSIUM SERPL-SCNC: 5.2 MMOL/L (ref 3.5–5.1)
POTASSIUM SERPL-SCNC: 5.9 MMOL/L (ref 3.5–5.1)
PROT SERPL-MCNC: 6.7 G/DL (ref 6.4–8.2)
PROT UR STRIP-MCNC: 30 MG/DL
Q-T INTERVAL, ECG07: 350 MS
QRS DURATION, ECG06: 88 MS
QTC CALCULATION (BEZET), ECG08: 456 MS
RBC # BLD AUTO: 3.58 M/UL (ref 3.8–5.2)
RBC #/AREA URNS HPF: ABNORMAL /HPF (ref 0–5)
SERVICE CMNT-IMP: ABNORMAL
SERVICE CMNT-IMP: NORMAL
SERVICE CMNT-IMP: NORMAL
SODIUM SERPL-SCNC: 133 MMOL/L (ref 136–145)
SODIUM SERPL-SCNC: 136 MMOL/L (ref 136–145)
SODIUM SERPL-SCNC: 139 MMOL/L (ref 136–145)
SODIUM SERPL-SCNC: 141 MMOL/L (ref 136–145)
SODIUM SERPL-SCNC: 141 MMOL/L (ref 136–145)
SODIUM SERPL-SCNC: 142 MMOL/L (ref 136–145)
SP GR UR REFRACTOMETRY: 1.03 (ref 1–1.03)
UA: UC IF INDICATED,UAUC: ABNORMAL
UROBILINOGEN UR QL STRIP.AUTO: 0.2 EU/DL (ref 0.2–1)
VENTRICULAR RATE, ECG03: 102 BPM
WBC # BLD AUTO: 11.2 K/UL (ref 3.6–11)
WBC URNS QL MICRO: ABNORMAL /HPF (ref 0–4)

## 2020-04-23 PROCEDURE — 85025 COMPLETE CBC W/AUTO DIFF WBC: CPT

## 2020-04-23 PROCEDURE — 65660000000 HC RM CCU STEPDOWN

## 2020-04-23 PROCEDURE — 96361 HYDRATE IV INFUSION ADD-ON: CPT

## 2020-04-23 PROCEDURE — 74011250636 HC RX REV CODE- 250/636: Performed by: HOSPITALIST

## 2020-04-23 PROCEDURE — 82962 GLUCOSE BLOOD TEST: CPT

## 2020-04-23 PROCEDURE — 74011250636 HC RX REV CODE- 250/636: Performed by: EMERGENCY MEDICINE

## 2020-04-23 PROCEDURE — 80307 DRUG TEST PRSMV CHEM ANLYZR: CPT

## 2020-04-23 PROCEDURE — 80053 COMPREHEN METABOLIC PANEL: CPT

## 2020-04-23 PROCEDURE — 93005 ELECTROCARDIOGRAM TRACING: CPT

## 2020-04-23 PROCEDURE — 80048 BASIC METABOLIC PNL TOTAL CA: CPT

## 2020-04-23 PROCEDURE — 74011636637 HC RX REV CODE- 636/637

## 2020-04-23 PROCEDURE — 74011000258 HC RX REV CODE- 258: Performed by: INTERNAL MEDICINE

## 2020-04-23 PROCEDURE — 0107U C DIFF TOX AG DETCJ IA STOOL: CPT

## 2020-04-23 PROCEDURE — 36415 COLL VENOUS BLD VENIPUNCTURE: CPT

## 2020-04-23 PROCEDURE — 74011000258 HC RX REV CODE- 258: Performed by: EMERGENCY MEDICINE

## 2020-04-23 PROCEDURE — 74011250636 HC RX REV CODE- 250/636: Performed by: INTERNAL MEDICINE

## 2020-04-23 PROCEDURE — 74011636637 HC RX REV CODE- 636/637: Performed by: INTERNAL MEDICINE

## 2020-04-23 PROCEDURE — 83735 ASSAY OF MAGNESIUM: CPT

## 2020-04-23 PROCEDURE — 83880 ASSAY OF NATRIURETIC PEPTIDE: CPT

## 2020-04-23 PROCEDURE — 84100 ASSAY OF PHOSPHORUS: CPT

## 2020-04-23 PROCEDURE — 83605 ASSAY OF LACTIC ACID: CPT

## 2020-04-23 PROCEDURE — 74011250637 HC RX REV CODE- 250/637: Performed by: HOSPITALIST

## 2020-04-23 PROCEDURE — 99285 EMERGENCY DEPT VISIT HI MDM: CPT

## 2020-04-23 PROCEDURE — 74011000250 HC RX REV CODE- 250: Performed by: HOSPITALIST

## 2020-04-23 PROCEDURE — 74011636637 HC RX REV CODE- 636/637: Performed by: EMERGENCY MEDICINE

## 2020-04-23 PROCEDURE — 96374 THER/PROPH/DIAG INJ IV PUSH: CPT

## 2020-04-23 PROCEDURE — 81001 URINALYSIS AUTO W/SCOPE: CPT

## 2020-04-23 RX ORDER — ONDANSETRON 2 MG/ML
4 INJECTION INTRAMUSCULAR; INTRAVENOUS
Status: DISCONTINUED | OUTPATIENT
Start: 2020-04-23 | End: 2020-04-25 | Stop reason: HOSPADM

## 2020-04-23 RX ORDER — METOPROLOL SUCCINATE 25 MG/1
25 TABLET, EXTENDED RELEASE ORAL DAILY
Status: DISCONTINUED | OUTPATIENT
Start: 2020-04-23 | End: 2020-04-25 | Stop reason: HOSPADM

## 2020-04-23 RX ORDER — DEXTROSE 50 % IN WATER (D50W) INTRAVENOUS SYRINGE
25-50 AS NEEDED
Status: DISCONTINUED | OUTPATIENT
Start: 2020-04-23 | End: 2020-04-25

## 2020-04-23 RX ORDER — SODIUM CHLORIDE 0.9 % (FLUSH) 0.9 %
5-40 SYRINGE (ML) INJECTION AS NEEDED
Status: DISCONTINUED | OUTPATIENT
Start: 2020-04-23 | End: 2020-04-25 | Stop reason: HOSPADM

## 2020-04-23 RX ORDER — DEXTROSE, SODIUM CHLORIDE, AND POTASSIUM CHLORIDE 5; .45; .15 G/100ML; G/100ML; G/100ML
100 INJECTION INTRAVENOUS CONTINUOUS
Status: DISCONTINUED | OUTPATIENT
Start: 2020-04-23 | End: 2020-04-24

## 2020-04-23 RX ORDER — ACETAMINOPHEN 325 MG/1
650 TABLET ORAL
Status: DISCONTINUED | OUTPATIENT
Start: 2020-04-23 | End: 2020-04-25 | Stop reason: HOSPADM

## 2020-04-23 RX ORDER — SODIUM CHLORIDE 0.9 % (FLUSH) 0.9 %
5-40 SYRINGE (ML) INJECTION EVERY 8 HOURS
Status: DISCONTINUED | OUTPATIENT
Start: 2020-04-23 | End: 2020-04-25 | Stop reason: HOSPADM

## 2020-04-23 RX ORDER — HEPARIN SODIUM 5000 [USP'U]/ML
5000 INJECTION, SOLUTION INTRAVENOUS; SUBCUTANEOUS EVERY 8 HOURS
Status: DISCONTINUED | OUTPATIENT
Start: 2020-04-23 | End: 2020-04-25 | Stop reason: HOSPADM

## 2020-04-23 RX ORDER — SODIUM CHLORIDE 9 MG/ML
125 INJECTION, SOLUTION INTRAVENOUS CONTINUOUS
Status: DISCONTINUED | OUTPATIENT
Start: 2020-04-23 | End: 2020-04-23

## 2020-04-23 RX ORDER — INSULIN LISPRO 100 [IU]/ML
INJECTION, SOLUTION INTRAVENOUS; SUBCUTANEOUS
Status: DISCONTINUED | OUTPATIENT
Start: 2020-04-23 | End: 2020-04-23

## 2020-04-23 RX ORDER — INSULIN LISPRO 100 [IU]/ML
INJECTION, SOLUTION INTRAVENOUS; SUBCUTANEOUS
Status: DISCONTINUED | OUTPATIENT
Start: 2020-04-23 | End: 2020-04-25

## 2020-04-23 RX ORDER — CLOPIDOGREL BISULFATE 75 MG/1
75 TABLET ORAL DAILY
Status: DISCONTINUED | OUTPATIENT
Start: 2020-04-23 | End: 2020-04-25 | Stop reason: HOSPADM

## 2020-04-23 RX ORDER — PRAVASTATIN SODIUM 40 MG/1
80 TABLET ORAL
Status: DISCONTINUED | OUTPATIENT
Start: 2020-04-23 | End: 2020-04-25 | Stop reason: HOSPADM

## 2020-04-23 RX ORDER — MAGNESIUM SULFATE 100 %
4 CRYSTALS MISCELLANEOUS AS NEEDED
Status: DISCONTINUED | OUTPATIENT
Start: 2020-04-23 | End: 2020-04-25

## 2020-04-23 RX ADMIN — SODIUM CHLORIDE 1000 ML: 900 INJECTION, SOLUTION INTRAVENOUS at 02:11

## 2020-04-23 RX ADMIN — SODIUM BICARBONATE: 84 INJECTION, SOLUTION INTRAVENOUS at 04:07

## 2020-04-23 RX ADMIN — HEPARIN SODIUM 5000 UNITS: 5000 INJECTION INTRAVENOUS; SUBCUTANEOUS at 21:00

## 2020-04-23 RX ADMIN — DEXTROSE, SODIUM CHLORIDE, AND POTASSIUM CHLORIDE 150 ML/HR: 5; .45; .15 INJECTION INTRAVENOUS at 12:49

## 2020-04-23 RX ADMIN — HEPARIN SODIUM 5000 UNITS: 5000 INJECTION INTRAVENOUS; SUBCUTANEOUS at 07:08

## 2020-04-23 RX ADMIN — PRAVASTATIN SODIUM 80 MG: 40 TABLET ORAL at 21:00

## 2020-04-23 RX ADMIN — SODIUM CHLORIDE 1000 ML: 900 INJECTION, SOLUTION INTRAVENOUS at 04:12

## 2020-04-23 RX ADMIN — POTASSIUM CHLORIDE: 2 INJECTION, SOLUTION, CONCENTRATE INTRAVENOUS at 10:12

## 2020-04-23 RX ADMIN — SODIUM CHLORIDE 12.1 UNITS/HR: 9 INJECTION, SOLUTION INTRAVENOUS at 10:12

## 2020-04-23 RX ADMIN — SODIUM CHLORIDE 10.8 UNITS/HR: 9 INJECTION, SOLUTION INTRAVENOUS at 04:00

## 2020-04-23 RX ADMIN — DEXTROSE, SODIUM CHLORIDE, AND POTASSIUM CHLORIDE 150 ML/HR: 5; .45; .15 INJECTION INTRAVENOUS at 19:41

## 2020-04-23 RX ADMIN — Medication 10 ML: at 21:00

## 2020-04-23 RX ADMIN — Medication 10 ML: at 14:21

## 2020-04-23 RX ADMIN — HUMAN INSULIN 10 UNITS: 100 INJECTION, SOLUTION SUBCUTANEOUS at 03:23

## 2020-04-23 RX ADMIN — Medication 10 ML: at 05:53

## 2020-04-23 RX ADMIN — HEPARIN SODIUM 5000 UNITS: 5000 INJECTION INTRAVENOUS; SUBCUTANEOUS at 14:22

## 2020-04-23 RX ADMIN — HUMAN INSULIN 10 UNITS: 100 INJECTION, SOLUTION SUBCUTANEOUS at 02:12

## 2020-04-23 NOTE — PROGRESS NOTES
Problem: Falls - Risk of  Goal: *Absence of Falls  Description: Document Chinyere Hines Fall Risk and appropriate interventions in the flowsheet. Outcome: Progressing Towards Goal  Note: Fall Risk Interventions:       Mentation Interventions: Adequate sleep, hydration, pain control, Bed/chair exit alarm    Medication Interventions: Assess postural VS orthostatic hypotension, Bed/chair exit alarm    Elimination Interventions: Bed/chair exit alarm, Call light in reach              Problem: Patient Education: Go to Patient Education Activity  Goal: Patient/Family Education  Outcome: Progressing Towards Goal     Problem: Pressure Injury - Risk of  Goal: *Prevention of pressure injury  Description: Document Madhu Scale and appropriate interventions in the flowsheet. Outcome: Progressing Towards Goal  Note: Pressure Injury Interventions:  Sensory Interventions: Assess changes in LOC, Discuss PT/OT consult with provider, Float heels    Moisture Interventions: Absorbent underpads, Check for incontinence Q2 hours and as needed, Limit adult briefs    Activity Interventions: Assess need for specialty bed, PT/OT evaluation    Mobility Interventions: Assess need for specialty bed, PT/OT evaluation    Nutrition Interventions: Document food/fluid/supplement intake, Offer support with meals,snacks and hydration                     Problem: Patient Education: Go to Patient Education Activity  Goal: Patient/Family Education  Outcome: Progressing Towards Goal     Problem: Risk for Spread of Infection  Goal: Prevent transmission of infectious organism to others  Description: Prevent the transmission of infectious organisms to other patients, staff members, and visitors.   Outcome: Progressing Towards Goal     Problem: Patient Education:  Go to Education Activity  Goal: Patient/Family Education  Outcome: Progressing Towards Goal

## 2020-04-23 NOTE — PROGRESS NOTES
ALEKSANDAR: home with family   1) patient would benefit from PT/OT while inpatient   2) may need assistive device for ambulation   3) daughter requesting shoulder to be evaluated as patient recently had a fall and has been complaining of shoulder pain since but refused to go to ED for evaluation due to COVID-19 pandemic  4) in process of applying for Medicaid   5) if [place] recommended upon d/c, [facility] is preferred agency   6) daughter or son to provide transportation at d/c    Reason for Admission: DKA                 RUR Score:   36%      PCP: First and Last name: Dr. Elisabeth Velasquez   [facility] of [facility]   Are you a current patient: Yes/No: yes   Approximate date of last visit: 2/27/2020 with no scheduled upcoming appointments     Specialist:   Cardiology [facility], last seen 07/02/2019 with upcoming appointment 07/08/2020             Resources/supports as identified by patient/family:  Family support, 1 daughterGray [phone] and 2 sons Esme Mckay [phone] and [address] facing patient (as identified by patient/family and CM): Finances/Medication cost?  Patient has no difficulty affording medications with her [facility] [employer] Medicare. Pt uses [facility] on [address]. Due to patient's progressing dementia, patient sometimes forgets to take her medications. Patient's daughter calls patient daily to ensure that patient takes her medications. Typically, daughter manages patient's medications and fills a pill box for patient. However, since the COVID-19 outbreak, daughter has not been visiting patient or filling the pill box because she does not want to be exposed to anything. Daughter provided all of patient's pill bottles to patient when COVID-19 isolation went into effect.      Daughter confirmed that patient is capable of administering her own insulin Transportation? Patient does not drive and relies on daughter and son for all transportation needs               Support system or lack thereof? Family support, 1 daughter, Zoë Whittington [phone] and 2 sons Julio Benton [phone] and Lulu Villalpando                     Living arrangements? Patient and two sons reside in a tri level home. Patient has 1 KELSIE, 3 steps to get to kitchen, and an additional 8 steps to get to bedroom. One of patient's son's, Chad Pena, is intellectually delayed, and is on disability. Patient's other son, Afshin Harris [phone], works full time. Self-care/ADLs/Cognition? At baseline, patient is independent with most ADLs and IADLs. Patient's son, Chad Pena, cooks breakfast and lunch for patient. Patient has been experiencing some increased difficulty with dressing and bathing since she fell recently and hurt her shoulder, patient refused to be evaluated in ED for possible shoulder injury due to COVID-19 outbreak    Per daughter, patient's balance is increasingly unsteady and likely is at the point where she will need a device to assist with safe ambulation. Patient would highly benefit from PT/OT evaluation. Patient has no DME at home          Current Advanced Directive/Advance Care Plan:  Patient is a full code and does not have an ACP on file. Patient likely unable to complete ACP at this time due to dementia. Plan for utilizing home health: If recommended, [facility] is the preferred agency                   Transition of Care Plan:                  Patient is a 68 y/o female who was admitted to [facility] for DKA. Patient also has pmhx of DM type 1, CHF, HTN, cholesterol, previous stroke, thyroid disease, and progressive dementia. CM contacted patient's daughter, Zoë Whittington [phone] to complete assessment due to patient's dementia. Daughter confirmed demographics, insurance, and emergency contact on file.      Per daughter, due to son's Evingtonvince Coates) intellectual delay, he is unable to provide much assistance to patient besides cooking and some small tasks. Patient's son is unable to assist with medications but is able to call 911 for patient in an emergency situation. Daughter reported that they are in the process of getting patient medicaid. CM is unsure what part of the Medicaid process they are currently in. Patient has never been to SNF or [facility] and has used bs for [facility] in the past.     Per daughter, her goal is for patient to return home upon d/c. Daughter reported that she is going to talk to her brother, Geryl Cranker, to discuss what changes can be made to ensure patient is safe at home. Daughter mentioned that once patient is d/c she will be going to patient's home again regularly to assist with arranging medications in pill box. Daughter or son to provide transportation at d/c.     [address] ( or ) will be following patient and arranging any d/c needs. Care Management Interventions  PCP Verified by CM: Yes  Last Visit to PCP: 02/27/20  Mode of Transport at Discharge:  Other (see comment)(daughter or son)  Transition of Care Consult (CM Consult): Discharge Planning  Discharge Durable Medical Equipment: No  Physical Therapy Consult: No  Occupational Therapy Consult: No  Speech Therapy Consult: No  Current Support Network: Family Lives Nearby, Own Home, Other(patient and 2 sons reside in a tri-level home and daughter who lives nearby)  Confirm Follow Up Transport: Family  Discharge Location  Discharge Placement: [address], [facility], [address]

## 2020-04-23 NOTE — H&P
HISTORY AND PHYSICAL      PCP: Nina Sahu MD  History source: ER      CC: high blood sugar      HPI: 67 y.o lady with type 1 DM who presents w/ hyperglycemia. Family called EMS due to altered mentation yesterday evening. They found her blood sugar to read \"hi\". She is lethargic and doesn't provide any history and does not answer any questions. PMH/PSH:  Past Medical History:   Diagnosis Date    Diabetes ([facility] [address]     Heart failure ([facility] [address]     unknown to family    Hypercholesteremia     Hypertension     Stroke ([facility] [address]     Thyroid disease      Past Surgical History:   Procedure Laterality Date    HX GYN      HX HEENT      thyroidectomy    HX HYSTERECTOMY      REMOVAL GALLBLADDER      THYROIDECTOMY         Home meds:   Prior to Admission medications    Medication Sig Start Date End Date Taking? Authorizing Provider   pravastatin (PRAVACHOL) 80 mg tablet TAKE 1 TABLET BY MOUTH at bedtime 2/27/20   Nina Began, MD   metoprolol succinate (TOPROL-XL) 25 mg XL tablet Take 1 Tab by mouth daily. 2/27/20   Nina Began, MD   levothyroxine (SYNTHROID) 175 mcg tablet TAKE 1 TABLET BY MOUTH EVERY DAY 2/27/20   Nina Began, MD   clopidogreL (PLAVIX) 75 mg tab TAKE 1 TABLET BY MOUTH EVERY DAY 2/27/20   Nina Began, MD   amLODIPine (NORVASC) 10 mg tablet TAKE 1 TABLET BY MOUTH EVERY DAY IN THE MORNING 2/27/20   Nina Began, MD polo glucose scanning reader (FREESTYLE KEVON 14 DAY READER) [facility] [place] As directed1 2/27/20   Nina Began, MD polo glucose sensor (FREESTYLE KEVON 14 DAY SENSOR) kit Continuous monitoring 2/27/20   Nina Began, MD   glucose blood VI test strips (ONETOUCH ULTRA BLUE TEST STRIP) strip USE TO CHECK BLOOD SUGARS DAILY. Dx: E11.649 2/25/20   Nina Began, MD   Blood-Glucose Meter (ONETOUCH ULTRA2 METER) misc USE TO CHECK BLOOD SUGARS DAILY.  2/25/20   Nina Began, MD   insulin degludec (TRESIBA FLEXTOUCH U-100) 100 unit/mL (3 mL) inpn 16 Units by SubCUTAneous route daily. Provider, Historical   insulin aspart U-100 (NOVOLOG) 100 unit/mL (3 mL) inpn 4 units AC breakfast,lunch, and dinner  Patient taking differently: 4 Units by SubCUTAneous route Before breakfast, lunch, and dinner. 4 units AC breakfast,lunch, and dinner 6/15/19   Gilberto Rasmussen MD   brimonidine (ALPHAGAN) 0.15 % ophthalmic solution Administer 1 Drop to both eyes two (2) times a day. 1/30/15   Provider, Historical       Allergies:  No Known Allergies    FH:  Family History   Problem Relation Age of Onset    Diabetes Father     No Known Problems Mother        SH:  Social History     Tobacco Use    Smoking status: Never Smoker    Smokeless tobacco: Never Used   Substance Use Topics    Alcohol use: No     Comment: been years       ROS: Review of systems not obtained due to patient factors.       PHYSICAL EXAM:  Visit Vitals  BP (!) 119/38 (BP 1 Location: Left arm, BP Patient Position: At rest)   Pulse (!) 102   Resp 14   SpO2 100%       Gen: appears ill  HEENT: anicteric sclerae, normal conjunctiva, MM dry  Neck: supple, trachea midline, no adenopathy  Heart: RRR, systolic murmur, no JVD, no peripheral edema  Lungs: CTA b/l, Kussmaul breathing  Abd: soft, NT, ND, BS+  Extr: warm  Skin: dry, no rash  Neuro/psych: CN II-XII grossly intact, lethargic      Labs/Imaging:  Recent Results (from the past 24 hour(s))   GLUCOSE, POC    Collection Time: 04/23/20  1:47 AM   Result Value Ref Range    Glucose (POC) >600 (HH) 65 - 100 mg/dL    Performed by Serena Bobby RN    EKG, 12 LEAD, INITIAL    Collection Time: 04/23/20  1:52 AM   Result Value Ref Range    Ventricular Rate 102 BPM    Atrial Rate 102 BPM    P-R Interval 156 ms    QRS Duration 88 ms    Q-T Interval 350 ms    QTC Calculation (Bezet) 456 ms    Calculated P Axis 48 degrees    Calculated R Axis -63 degrees    Calculated T Axis 88 degrees    Diagnosis       Sinus tachycardia  Left axis deviation  Nonspecific ST abnormality  When compared with ECG of 19-FEB-2020 12:11,  Nonspecific T wave abnormality, worse in Lateral leads     CBC WITH AUTOMATED DIFF    Collection Time: 04/23/20  1:55 AM   Result Value Ref Range    WBC 11.2 (H) 3.6 - 11.0 K/uL    RBC 3.58 (L) 3.80 - 5.20 M/uL    HGB 11.1 (L) 11.5 - 16.0 g/dL    HCT 36.8 35.0 - 47.0 %    .8 (H) 80.0 - 99.0 FL    MCH 31.0 26.0 - 34.0 PG    MCHC 30.2 30.0 - 36.5 g/dL    RDW 16.1 (H) 11.5 - 14.5 %    PLATELET 806 724 - 441 K/uL    MPV 10.3 8.9 - 12.9 FL    NRBC 0.0 0  WBC    ABSOLUTE NRBC 0.00 0.00 - 0.01 K/uL    NEUTROPHILS 83 (H) 32 - 75 %    LYMPHOCYTES 8 (L) 12 - 49 %    MONOCYTES 7 5 - 13 %    EOSINOPHILS 0 0 - 7 %    BASOPHILS 0 0 - 1 %    IMMATURE GRANULOCYTES 2 (H) 0.0 - 0.5 %    ABS. NEUTROPHILS 9.3 (H) 1.8 - 8.0 K/UL    ABS. LYMPHOCYTES 0.9 0.8 - 3.5 K/UL    ABS. MONOCYTES 0.8 0.0 - 1.0 K/UL    ABS. EOSINOPHILS 0.0 0.0 - 0.4 K/UL    ABS. BASOPHILS 0.0 0.0 - 0.1 K/UL    ABS. IMM. GRANS. 0.2 (H) 0.00 - 0.04 K/UL    DF AUTOMATED     METABOLIC PANEL, COMPREHENSIVE    Collection Time: 04/23/20  1:55 AM   Result Value Ref Range    Sodium 133 (L) 136 - 145 mmol/L    Potassium 5.9 (H) 3.5 - 5.1 mmol/L    Chloride 99 97 - 108 mmol/L    CO2 <5 (LL) 21 - 32 mmol/L    Anion gap Cannot be calculated 5 - 15 mmol/L    Glucose 810 (HH) 65 - 100 mg/dL    BUN 28 (H) 6 - 20 MG/DL    Creatinine 1.76 (H) 0.55 - 1.02 MG/DL    BUN/Creatinine ratio 16 12 - 20      GFR est AA 34 (L) >60 ml/min/1.73m2    GFR est non-AA 28 (L) >60 ml/min/1.73m2    Calcium 8.3 (L) 8.5 - 10.1 MG/DL    Bilirubin, total 0.4 0.2 - 1.0 MG/DL    ALT (SGPT) 32 12 - 78 U/L    AST (SGOT) 23 15 - 37 U/L    Alk.  phosphatase 222 (H) 45 - 117 U/L    Protein, total 6.7 6.4 - 8.2 g/dL    Albumin 3.2 (L) 3.5 - 5.0 g/dL    Globulin 3.5 2.0 - 4.0 g/dL    A-G Ratio 0.9 (L) 1.1 - 2.2     LACTIC ACID    Collection Time: 04/23/20  1:55 AM   Result Value Ref Range    Lactic acid 4.2 (HH) 0.4 - 2.0 MMOL/L   GLUCOSE, POC    Collection Time: 04/23/20  3:13 AM Result Value Ref Range    Glucose (POC) >600 (HH) 65 - 100 mg/dL    Performed by Parrish Larsen RN    GLUCOSE, POC    Collection Time: 04/23/20  3:14 AM   Result Value Ref Range    Glucose (POC) >600 (HH) 65 - 100 mg/dL    Performed by Parrish Larsen RN        Recent Labs     04/23/20  0155   WBC 11.2*   HGB 11.1*   HCT 36.8        Recent Labs     04/23/20 0155   *   K 5.9*   CL 99   CO2 <5*   BUN 28*   CREA 1.76*   *   CA 8.3*     Recent Labs     04/23/20  0155   SGOT 23   ALT 32   *   TBILI 0.4   TP 6.7   ALB 3.2*   GLOB 3.5       No results for input(s): CPK, CKNDX, TROIQ in the last 72 hours. No lab exists for component: CPKMB    No results for input(s): INR, PTP, APTT, INREXT in the last 72 hours. No results for input(s): PH, PCO2, PO2 in the last 72 hours. No results found.         Assessment & Plan:     DKA:  -insulin drip per protocol  -monitor lytes  -bolus additional IVNS then start bicarbonate drip for severe acidosis    History of CVA:  -continue Plavix    CHF: per problem list. Last TTE without systolic or diastolic dysfunction    Hypothyroidism:  -continue synthroid    HTN    Dementia    DVT ppx: sq heparin  Code status: full  Disposition: TBD    Signed By: Maximo Rocha MD     April 23, 2020

## 2020-04-23 NOTE — PROGRESS NOTES
0720: Bedside and Verbal shift change report given to Nicci Alvarez RN (oncoming nurse) by Esme Ramirez RN (offgoing nurse). Report included the following information SBAR, Kardex, ED Summary, Intake/Output, MAR, Recent Results and Cardiac Rhythm NSR. VS taken; MEWS score 1. Pt resting in bed, A+Ox3; hx of dementia. No stated needs at this time. CB within reach. 3104: Lab staff called to communicate critical labs for pt. K+ change to 3.7, CO2 of 7; Lactic of 3.7. RN contacted provider via [facility] phone call and left msg. Awaiting response. 1042: VS taken; MEWS score 2. Pt resting in bed, remains drowsy but alert when prompted. 1101: Provider Audelia Cotto called via [employer] to communicate need for dextrose in fluids. Pt blood sugar <250 x2.     1500: Provider Audelia Cotto paged for C diff r/o order. Pt has had numerous watery BM since this am. Incontinence and leobardo care provided. New linens provided as well.     5732: C diff sample sent to lab.     1820: BMP and Mag drawn per order. 1845: No additional updates over shift. Pt slept on and off throughout most of the shift. No needs at this time. VSS.     1900: Bedside and Verbal shift change report given to Shauna Simon RN (oncoming nurse) by Nicci Alvarez RN (offgoing nurse). Report included the following information SBAR, Kardex, Intake/Output, MAR, Recent Results and Cardiac Rhythm NSR.

## 2020-04-23 NOTE — ED NOTES
TRANSFER - OUT REPORT:    Verbal report given to Khang(name) on Deana Landa  being transferred to stepdown(unit) for routine progression of care       Report consisted of patients Situation, Background, Assessment and   Recommendations(SBAR). Information from the following report(s) SBAR, Kardex, ED Summary and MAR was reviewed with the receiving nurse. Lines:   Peripheral IV 04/23/20 Left Forearm (Active)       Peripheral IV 04/23/20 Left Hand (Active)   Site Assessment Clean, dry, & intact 4/23/2020  4:12 AM   Phlebitis Assessment 0 4/23/2020  4:12 AM   Infiltration Assessment 0 4/23/2020  4:12 AM   Dressing Status Clean, dry, & intact 4/23/2020  4:12 AM   Dressing Type Transparent 4/23/2020  4:12 AM   Hub Color/Line Status Pink 4/23/2020  4:12 AM        Opportunity for questions and clarification was provided. Patient transported with:   Monitor  Registered Nurse   Oncoming nurse notified blood sugar due on the hour with next due at 0500 and repeat lactic due at 0600.  Patient on insulin drip

## 2020-04-23 NOTE — ED TRIAGE NOTES
Patient brought by EMS for complaints of high blood sugar. EMS reported patient found in bed of her home. Per EMS report sheet patient GCS 15. On arrival patient moaning but not verbal. GCS 11. Patient falling asleep during conversation. Unable to obtain history from patient due to condition. Tachypneic. Mouth dry.

## 2020-04-23 NOTE — ED PROVIDER NOTES
EMERGENCY DEPARTMENT HISTORY AND PHYSICAL EXAM     ----------------------------------------------------------------------------  Please note that this dictation was completed with [employer], the computer voice recognition software. Quite often unanticipated grammatical, syntax, homophones, and other interpretive errors are inadvertently transcribed by the computer software. Please disregard these errors. 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 Per EMS patient lives at home with her adult son who is intellectually delayed. EMS notes that the son called 911 this evening when patient became more altered. EMS reports that she is well-known to their service for recurrent hypoglycemia transports. Patient has been mostly incoherent during transport intermittently moaning out and having tachypnea. Patient does answer to voice and follows commands but appears in acute distress with Kussmaul type breathing. EMS reports her blood sugar was read as \"HI\" monitor. No IV access was obtained or medications provided prior to arrival to emergency department. No further HPI information could be obtained at this time due to patient's mental status.     PCP: Nina Sahu MD    No Known Allergies    Current Facility-Administered Medications   Medication Dose Route Frequency Provider Last Rate Last Dose    ondansetron (ZOFRAN) injection 4 mg  4 mg IntraVENous Q1H PRN Елена Joy MD        insulin regular (NOVOLIN R, HUMULIN R) 100 unit/mL injection Stopped at 04/23/20 0219    insulin regular (NOVOLIN R, HUMULIN R) 100 Units in 0.9% sodium chloride 100 mL infusion  0-50 Units/hr IntraVENous TITRATE Taylor Rosales MD 14.7 mL/hr at 04/23/20 0500 14.7 Units/hr at 04/23/20 0500    insulin lispro (HUMALOG) injection   SubCUTAneous TIDAC Taylor Rosales MD        glucose chewable tablet 16 g  4 Tab Oral PRN Taylor Rosales MD        dextrose (D50W) injection syrg 12.5-25 g  25-50 mL IntraVENous PRN Taylor Rosales MD        glucagon (GLUCAGEN) injection 1 mg  1 mg IntraMUSCular PRN Taylor Rosales MD        acetaminophen (TYLENOL) tablet 650 mg  650 mg Oral Q6H PRN Taylor Rosales MD        ondansetron [facility]) injection 4 mg  4 mg IntraVENous Q4H PRN Taylor Rosales MD        sodium chloride (NS) flush 5-40 mL  5-40 mL IntraVENous Q8H Vin Tejeda MD        sodium chloride (NS) flush 5-40 mL  5-40 mL IntraVENous PRN Vin Tejeda MD        acetaminophen (TYLENOL) tablet 650 mg  650 mg Oral Q4H PRN Dennis Boeck, MD        ondansetron [facility]) injection 4 mg  4 mg IntraVENous Q4H PRN Vin Tejeda MD        heparin (porcine) injection 5,000 Units  5,000 Units SubCUTAneous Q8H Vin Tejeda MD        sodium bicarbonate (8.4%) 150 mEq in sterile water 1,000 mL infusion   IntraVENous CONTINUOUS Dennis Boeck,  mL/hr at 04/23/20 0407      clopidogreL (PLAVIX) tablet 75 mg  75 mg Oral DAILY Dennis Boeck, MD        levothyroxine (SYNTHROID) tablet 175 mcg  175 mcg Oral 6am Dennis Boeck, MD        metoprolol succinate (TOPROL-XL) XL tablet 25 mg  25 mg Oral DAILY Vin Tejeda MD        pravastatin (PRAVACHOL) tablet 80 mg  80 mg Oral QHS Dennis Boeck, MD           Past History     Past Medical History:  Past Medical History:   Diagnosis Date    Diabetes [facility] [address]     Heart failure ([facility] [address]     unknown to family    Hypercholesteremia     Hypertension     Stroke [facility])     Thyroid disease        Past Surgical History:  Past Surgical History:   Procedure Laterality Date    HX GYN      HX HEENT      thyroidectomy    HX HYSTERECTOMY      REMOVAL GALLBLADDER      THYROIDECTOMY         Family History:  Family History   Problem Relation Age of Onset    Diabetes Father     No Known Problems Mother        Social History:  Social History     Tobacco Use    Smoking status: Never Smoker    Smokeless tobacco: Never Used   Substance Use Topics    Alcohol use: No     Comment: been years    Drug use: No       Allergies:  No Known Allergies      Review of Systems   Review of Systems   Unable to perform ROS: Mental status change         Physical Exam   Physical Exam  Vitals signs and nursing note reviewed. Constitutional:       General: She is in acute distress. Appearance: She is well-developed and normal weight. She is ill-appearing. She is not diaphoretic. HENT:      Head: Normocephalic and atraumatic. Nose: Nose normal.   Eyes:      General: No scleral icterus. Conjunctiva/sclera: Conjunctivae normal.   Neck:      Musculoskeletal: Normal range of motion. Trachea: No tracheal deviation. Cardiovascular:      Rate and Rhythm: Regular rhythm. Tachycardia present. Heart sounds: Normal heart sounds. No murmur. No friction rub. Pulmonary:      Effort: Pulmonary effort is normal. No respiratory distress. Breath sounds: Normal breath sounds. No stridor. No wheezing or rales. Abdominal:      General: Bowel sounds are normal. There is no distension. Palpations: Abdomen is soft. Tenderness: There is no abdominal tenderness. There is no rebound. Musculoskeletal: Normal range of motion. General: No tenderness. Skin:     General: Skin is warm and dry. Findings: No rash. Neurological:      General: No focal deficit present. Mental Status: She is easily aroused. She is disoriented. GCS: GCS eye subscore is 4. GCS verbal subscore is 4.  GCS motor subscore is 6.      Cranial Nerves: Cranial nerves are intact. No cranial nerve deficit. Diagnostic Study Results     Labs -     Recent Results (from the past 12 hour(s))   GLUCOSE, POC    Collection Time: 04/23/20  1:47 AM   Result Value Ref Range    Glucose (POC) >600 (HH) 65 - 100 mg/dL    Performed by Lita Denis RN    EKG, 12 LEAD, INITIAL    Collection Time: 04/23/20  1:52 AM   Result Value Ref Range    Ventricular Rate 102 BPM    Atrial Rate 102 BPM    P-R Interval 156 ms    QRS Duration 88 ms    Q-T Interval 350 ms    QTC Calculation (Bezet) 456 ms    Calculated P Axis 48 degrees    Calculated R Axis -63 degrees    Calculated T Axis 88 degrees    Diagnosis       Sinus tachycardia  Left axis deviation  Nonspecific ST abnormality  When compared with ECG of 19-FEB-2020 12:11,  Nonspecific T wave abnormality, worse in Lateral leads     CBC WITH AUTOMATED DIFF    Collection Time: 04/23/20  1:55 AM   Result Value Ref Range    WBC 11.2 (H) 3.6 - 11.0 K/uL    RBC 3.58 (L) 3.80 - 5.20 M/uL    HGB 11.1 (L) 11.5 - 16.0 g/dL    HCT 36.8 35.0 - 47.0 %    .8 (H) 80.0 - 99.0 FL    MCH 31.0 26.0 - 34.0 PG    MCHC 30.2 30.0 - 36.5 g/dL    RDW 16.1 (H) 11.5 - 14.5 %    PLATELET 796 924 - 758 K/uL    MPV 10.3 8.9 - 12.9 FL    NRBC 0.0 0  WBC    ABSOLUTE NRBC 0.00 0.00 - 0.01 K/uL    NEUTROPHILS 83 (H) 32 - 75 %    LYMPHOCYTES 8 (L) 12 - 49 %    MONOCYTES 7 5 - 13 %    EOSINOPHILS 0 0 - 7 %    BASOPHILS 0 0 - 1 %    IMMATURE GRANULOCYTES 2 (H) 0.0 - 0.5 %    ABS. NEUTROPHILS 9.3 (H) 1.8 - 8.0 K/UL    ABS. LYMPHOCYTES 0.9 0.8 - 3.5 K/UL    ABS. MONOCYTES 0.8 0.0 - 1.0 K/UL    ABS. EOSINOPHILS 0.0 0.0 - 0.4 K/UL    ABS. BASOPHILS 0.0 0.0 - 0.1 K/UL    ABS. IMM.  GRANS. 0.2 (H) 0.00 - 0.04 K/UL    DF AUTOMATED     METABOLIC PANEL, COMPREHENSIVE    Collection Time: 04/23/20  1:55 AM   Result Value Ref Range    Sodium 133 (L) 136 - 145 mmol/L    Potassium 5.9 (H) 3.5 - 5.1 mmol/L    Chloride 99 97 - 108 mmol/L    CO2 <5 (LL) 21 - 32 mmol/L    Anion gap Cannot be calculated 5 - 15 mmol/L    Glucose 810 (HH) 65 - 100 mg/dL    BUN 28 (H) 6 - 20 MG/DL    Creatinine 1.76 (H) 0.55 - 1.02 MG/DL    BUN/Creatinine ratio 16 12 - 20      GFR est AA 34 (L) >60 ml/min/1.73m2    GFR est non-AA 28 (L) >60 ml/min/1.73m2    Calcium 8.3 (L) 8.5 - 10.1 MG/DL    Bilirubin, total 0.4 0.2 - 1.0 MG/DL    ALT (SGPT) 32 12 - 78 U/L    AST (SGOT) 23 15 - 37 U/L    Alk.  phosphatase 222 (H) 45 - 117 U/L    Protein, total 6.7 6.4 - 8.2 g/dL    Albumin 3.2 (L) 3.5 - 5.0 g/dL    Globulin 3.5 2.0 - 4.0 g/dL    A-G Ratio 0.9 (L) 1.1 - 2.2     LACTIC ACID    Collection Time: 04/23/20  1:55 AM   Result Value Ref Range    Lactic acid 4.2 (HH) 0.4 - 2.0 MMOL/L   URINALYSIS W/ REFLEX CULTURE    Collection Time: 04/23/20  3:06 AM   Result Value Ref Range    Color YELLOW/STRAW      Appearance CLEAR CLEAR      Specific gravity 1.026 1.003 - 1.030      pH (UA) 5.0 5.0 - 8.0      Protein 30 (A) NEG mg/dL    Glucose >1,000 (A) NEG mg/dL    Ketone 80 (A) NEG mg/dL    Bilirubin Negative NEG      Blood Negative NEG      Urobilinogen 0.2 0.2 - 1.0 EU/dL    Nitrites Negative NEG      Leukocyte Esterase Negative NEG      WBC 0-4 0 - 4 /hpf    RBC 0-5 0 - 5 /hpf    Epithelial cells FEW FEW /lpf    Bacteria Negative NEG /hpf    UA:UC IF INDICATED CULTURE NOT INDICATED BY UA RESULT CNI      Hyaline cast 0-2 0 - 5 /lpf   DRUG SCREEN, URINE    Collection Time: 04/23/20  3:06 AM   Result Value Ref Range    AMPHETAMINES Negative NEG      BARBITURATES Negative NEG      BENZODIAZEPINES Negative NEG      COCAINE Negative NEG      METHADONE Negative NEG      OPIATES Negative NEG      PCP(PHENCYCLIDINE) Negative NEG      THC (TH-CANNABINOL) Negative NEG      Drug screen comment (NOTE)    METABOLIC PANEL, BASIC    Collection Time: 04/23/20  3:06 AM   Result Value Ref Range    Sodium 136 136 - 145 mmol/L    Potassium 5.2 (H) 3.5 - 5.1 mmol/L    Chloride 103 97 - 108 mmol/L    CO2 5 (LL) 21 - 32 mmol/L    Anion gap 28 (H) 5 - 15 mmol/L    Glucose 740 (HH) 65 - 100 mg/dL    BUN 29 (H) 6 - 20 MG/DL    Creatinine 1.83 (H) 0.55 - 1.02 MG/DL    BUN/Creatinine ratio 16 12 - 20      GFR est AA 32 (L) >60 ml/min/1.73m2    GFR est non-AA 27 (L) >60 ml/min/1.73m2    Calcium 7.8 (L) 8.5 - 10.1 MG/DL   MAGNESIUM    Collection Time: 04/23/20  3:06 AM   Result Value Ref Range    Magnesium 2.4 1.6 - 2.4 mg/dL   PHOSPHORUS    Collection Time: 04/23/20  3:06 AM   Result Value Ref Range    Phosphorus 8.3 (H) 2.6 - 4.7 MG/DL   GLUCOSE, POC    Collection Time: 04/23/20  3:13 AM   Result Value Ref Range    Glucose (POC) >600 (HH) 65 - 100 mg/dL    Performed by Roshni Phelps RN    GLUCOSE, POC    Collection Time: 04/23/20  3:14 AM   Result Value Ref Range    Glucose (POC) >600 (HH) 65 - 100 mg/dL    Performed by Roshni Phelps RN    GLUCOSE, POC    Collection Time: 04/23/20  4:58 AM   Result Value Ref Range    Glucose (POC) 549 (H) 65 - 100 mg/dL    Performed by Roshni Phelps RN        Radiologic Studies -   No orders to display     CT Results  (Last 48 hours)    None        CXR Results  (Last 48 hours)    None            Medical Decision Making   I am the first provider for this patient. I reviewed the vital signs, available nursing notes, past medical history, past surgical history, family history and social history. Vital Signs-Reviewed the patient's vital signs.   Patient Vitals for the past 12 hrs:   Temp Pulse Resp BP SpO2   04/23/20 0506 97.6 °F (36.4 °C) 96 24 124/42 100 %   04/23/20 0500  95 24 124/42 100 %   04/23/20 0430  97 25 113/47 100 %   04/23/20 0400  (!) 101 24 124/43 100 %   04/23/20 0330  (!) 102 25 116/40 100 %   04/23/20 0300  (!) 101 26 124/41 100 %   04/23/20 0245  99 28 122/43 100 %   04/23/20 0230  99 30 128/42 100 %   04/23/20 0215  97 25 127/48 100 %   04/23/20 0200  (!) 102 23 (!) 133/39 100 %   04/23/20 0158     100 %   04/23/20 0151  (!) 102 14 (!) 119/38 100 %   04/23/20 0145  (!) 103 27 (!) 119/38 99 %   04/23/20 0138    142/41 99 %       Pulse Oximetry Analysis - 100% on RA    Cardiac Monitor:   Rate: 102 bpm  Rhythm: Is tachycardia      Provider Notes (Medical Decision Making):     DDX:  DKA, HHS, electrolyte abnormality, dehydration, UTI    Plan:  Accu-Chek, labs, IV fluids, insulin    Impression:  DKA    ED Course:   Initial assessment performed. The patients presenting problems have been discussed, and they are in agreement with the care plan formulated and outlined with them. I have encouraged them to ask questions as they arise throughout their visit. I reviewed our electronic medical record system for any past medical records that were available that may contribute to the patients current condition, the nursing notes and and vital signs from today's visit    Nursing notes will be reviewed as they become available in realtime while the pt has been in the ED. Celeste Swenson MD    EKG interpretation 8567: Tachycardia, L Axis, rate 102; , QRS 88, QTc 456; no acute ischemia; interpreted by Celeste Swenson MD    I personally reviewed/interpreted pt's imaging. Agree with official read by radiology as noted above. Celeste Swenson MD    PROGRESS NOTE:  3:02 AM  Pt noted to have blood sugar of 810 with a CO2 level less than 5.   Will plan for insulin drip with infusion and admission to hospitalist.  Celeste Swenson MD             Critical Care Time:     CRITICAL CARE NOTE  IMPENDING DETERIORATION -Airway, Respiratory, Cardiovascular, CNS, Metabolic and Renal  ASSOCIATED RISK FACTORS - Shock, Dysrhythmia, Metabolic changes, Dehydration, Vascular Compromise and CNS Decompensation  MANAGEMENT- Bedside Assessment and Supervision of Care  INTERPRETATION -  Blood Gases, ECG, Blood Pressure and Cardiac Output Measures   INTERVENTIONS - hemodynamic mngmt, vascular control, Neurologic interventions  and Metobolic interventions  CASE REVIEW - Hospitalist, Nursing and Family  TREATMENT RESPONSE -Improved  PERFORMED BY - Self  NOTES   :  I have spent 125 minutes of critical care time involved in lab review, consultations with specialist, family decision- making, bedside attention and documentation excluding time spent on any separately billed procedures. During this entire length of time I was immediately available to the patient . Gosia Cameron MD      Diagnosis     Clinical Impression:   1. Diabetic ketoacidosis without coma associated with other specified diabetes mellitus ([facility] [address]        PLAN:  1. Admit to hospitalist          This note will not be viewable in [address]

## 2020-04-23 NOTE — PROGRESS NOTES
General Daily Progress Note    Admit Date: 4/23/2020    Subjective:     Patient has no complaint. .     Current Facility-Administered Medications   Medication Dose Route Frequency    ondansetron (ZOFRAN) injection 4 mg  4 mg IntraVENous Q1H PRN    insulin regular (NOVOLIN R, HUMULIN R) 100 unit/mL injection        insulin regular (NOVOLIN R, HUMULIN R) 100 Units in 0.9% sodium chloride 100 mL infusion  0-50 Units/hr IntraVENous TITRATE    glucose chewable tablet 16 g  4 Tab Oral PRN    dextrose (D50W) injection syrg 12.5-25 g  25-50 mL IntraVENous PRN    glucagon (GLUCAGEN) injection 1 mg  1 mg IntraMUSCular PRN    acetaminophen (TYLENOL) tablet 650 mg  650 mg Oral Q6H PRN    ondansetron (ZOFRAN) injection 4 mg  4 mg IntraVENous Q4H PRN    sodium chloride (NS) flush 5-40 mL  5-40 mL IntraVENous Q8H    sodium chloride (NS) flush 5-40 mL  5-40 mL IntraVENous PRN    acetaminophen (TYLENOL) tablet 650 mg  650 mg Oral Q4H PRN    ondansetron (ZOFRAN) injection 4 mg  4 mg IntraVENous Q4H PRN    heparin (porcine) injection 5,000 Units  5,000 Units SubCUTAneous Q8H    clopidogreL (PLAVIX) tablet 75 mg  75 mg Oral DAILY    levothyroxine (SYNTHROID) tablet 175 mcg  175 mcg Oral 6am    metoprolol succinate (TOPROL-XL) XL tablet 25 mg  25 mg Oral DAILY    pravastatin (PRAVACHOL) tablet 80 mg  80 mg Oral QHS    0.45% sodium chloride 1,000 mL with potassium chloride 30 mEq infusion   IntraVENous CONTINUOUS    insulin lispro (HUMALOG) injection   SubCUTAneous TIDAC        Review of Systems  A comprehensive review of systems was negative.     Objective:     Patient Vitals for the past 24 hrs:   BP Temp Pulse Resp SpO2 Height Weight   04/23/20 0730 (!) 127/39 97.4 °F (36.3 °C) 82 29 100 %     04/23/20 0521 (!) 124/35 97.6 °F (36.4 °C) 91 24 100 % 5' 3\" (1.6 m) 144 lb 6.4 oz (65.5 kg)   04/23/20 0506 124/42 97.6 °F (36.4 °C) 96 24 100 %     04/23/20 0500 124/42  95 24 100 %     04/23/20 0430 113/47  97 25 100 %     04/23/20 0400 124/43  (!) 101 24 100 %     04/23/20 0330 116/40  (!) 102 25 100 %     04/23/20 0300 124/41  (!) 101 26 100 %     04/23/20 0245 122/43  99 28 100 %     04/23/20 0230 128/42  99 30 100 %     04/23/20 0215 127/48  97 25 100 %     04/23/20 0200 (!) 133/39  (!) 102 23 100 %     04/23/20 0158     100 %     04/23/20 0151 (!) 119/38  (!) 102 14 100 %     04/23/20 0145 (!) 119/38  (!) 103 27 99 %     04/23/20 0138 142/41    99 %       No intake/output data recorded. 04/21 1901 - 04/23 0700  In: 2000 [I.V.:2000]  Out: -     Physical Exam:   Visit Vitals  BP (!) 127/39   Pulse 82   Temp 97.4 °F (36.3 °C)   Resp 29   Ht 5' 3\" (1.6 m)   Wt 144 lb 6.4 oz (65.5 kg)   SpO2 100%   BMI 25.58 kg/m²     General appearance: alert, cooperative, no distress, appears stated age  Neck: supple, symmetrical, trachea midline, no adenopathy, thyroid: not enlarged, symmetric, no tenderness/mass/nodules, no carotid bruit and no JVD  Lungs: clear to auscultation bilaterally  Heart: regular rate and rhythm, S1, S2 normal, no murmur, click, rub or gallop  Abdomen: soft, non-tender. Bowel sounds normal. No masses,  no organomegaly  Extremities: edema trace    Assessment:     Active Problems:    DKA (diabetic ketoacidoses) ([facility] [address] (6/2/2017)        Plan:     1. Patient admitted with DKA secondary to noncompliance complicated by her progressive dementia. We will continue insulin drip until CO2 is greater than 20 at which point she will be transition off of the drip. Continue aggressive hydration. 2.  No overt signs of cardiac decompensation. 3.  No obvious secondary infections.

## 2020-04-23 NOTE — PROGRESS NOTES
TRANSFER - IN REPORT:    Verbal report received from Mckenna Mera (name) on Felicity Hawk  being received from ED (unit) for routine progression of care      Report consisted of patients Situation, Background, Assessment and   Recommendations(SBAR). Information from the following report(s) SBAR, Kardex, Intake/Output, MAR, Recent Results and Cardiac Rhythm ST was reviewed with the receiving nurse. Opportunity for questions and clarification was provided. Assessment completed upon patients arrival to unit and care assumed. 2 Person skin assessment completed with Dick Seralexander.    0530  On assessment, patient was found to be AOX2, to person and place. 0650  Q4 BMP & Lactic acid drawn and sent to lab. Bedside shift change report given to Mallory Archer (oncoming nurse) by Anay Zamorano (offgoing nurse). Report included the following information SBAR, Kardex, Intake/Output, MAR, Recent Results and Cardiac Rhythm NSR.

## 2020-04-24 LAB
ALBUMIN SERPL-MCNC: 2.4 G/DL (ref 3.5–5)
ALBUMIN/GLOB SERPL: 0.8 {RATIO} (ref 1.1–2.2)
ALP SERPL-CCNC: 147 U/L (ref 45–117)
ALT SERPL-CCNC: 25 U/L (ref 12–78)
ANION GAP SERPL CALC-SCNC: 10 MMOL/L (ref 5–15)
ANION GAP SERPL CALC-SCNC: 8 MMOL/L (ref 5–15)
AST SERPL-CCNC: 20 U/L (ref 15–37)
BASOPHILS # BLD: 0 K/UL (ref 0–0.1)
BASOPHILS NFR BLD: 0 % (ref 0–1)
BILIRUB SERPL-MCNC: 0.4 MG/DL (ref 0.2–1)
BLASTS NFR BLD MANUAL: 0 %
BNP SERPL-MCNC: 1003 PG/ML
BUN SERPL-MCNC: 14 MG/DL (ref 6–20)
BUN SERPL-MCNC: 17 MG/DL (ref 6–20)
BUN/CREAT SERPL: 16 (ref 12–20)
BUN/CREAT SERPL: 17 (ref 12–20)
C DIFF GDH STL QL: NEGATIVE
C DIFF TOX A+B STL QL IA: NEGATIVE
CALCIUM SERPL-MCNC: 7.6 MG/DL (ref 8.5–10.1)
CALCIUM SERPL-MCNC: 7.9 MG/DL (ref 8.5–10.1)
CHLORIDE SERPL-SCNC: 112 MMOL/L (ref 97–108)
CHLORIDE SERPL-SCNC: 113 MMOL/L (ref 97–108)
CO2 SERPL-SCNC: 19 MMOL/L (ref 21–32)
CO2 SERPL-SCNC: 21 MMOL/L (ref 21–32)
CREAT SERPL-MCNC: 0.9 MG/DL (ref 0.55–1.02)
CREAT SERPL-MCNC: 1.03 MG/DL (ref 0.55–1.02)
DIFFERENTIAL METHOD BLD: ABNORMAL
EOSINOPHIL # BLD: 0 K/UL (ref 0–0.4)
EOSINOPHIL NFR BLD: 0 % (ref 0–7)
ERYTHROCYTE [DISTWIDTH] IN BLOOD BY AUTOMATED COUNT: 15.5 % (ref 11.5–14.5)
GLOBULIN SER CALC-MCNC: 3 G/DL (ref 2–4)
GLUCOSE BLD STRIP.AUTO-MCNC: 130 MG/DL (ref 65–100)
GLUCOSE BLD STRIP.AUTO-MCNC: 138 MG/DL (ref 65–100)
GLUCOSE BLD STRIP.AUTO-MCNC: 142 MG/DL (ref 65–100)
GLUCOSE BLD STRIP.AUTO-MCNC: 151 MG/DL (ref 65–100)
GLUCOSE BLD STRIP.AUTO-MCNC: 154 MG/DL (ref 65–100)
GLUCOSE BLD STRIP.AUTO-MCNC: 165 MG/DL (ref 65–100)
GLUCOSE BLD STRIP.AUTO-MCNC: 190 MG/DL (ref 65–100)
GLUCOSE BLD STRIP.AUTO-MCNC: 196 MG/DL (ref 65–100)
GLUCOSE BLD STRIP.AUTO-MCNC: 202 MG/DL (ref 65–100)
GLUCOSE BLD STRIP.AUTO-MCNC: 205 MG/DL (ref 65–100)
GLUCOSE BLD STRIP.AUTO-MCNC: 263 MG/DL (ref 65–100)
GLUCOSE BLD STRIP.AUTO-MCNC: 306 MG/DL (ref 65–100)
GLUCOSE BLD STRIP.AUTO-MCNC: 314 MG/DL (ref 65–100)
GLUCOSE BLD STRIP.AUTO-MCNC: 90 MG/DL (ref 65–100)
GLUCOSE SERPL-MCNC: 158 MG/DL (ref 65–100)
GLUCOSE SERPL-MCNC: 264 MG/DL (ref 65–100)
HCT VFR BLD AUTO: 31.6 % (ref 35–47)
HGB BLD-MCNC: 10.8 G/DL (ref 11.5–16)
IMM GRANULOCYTES # BLD AUTO: 0 K/UL
IMM GRANULOCYTES NFR BLD AUTO: 0 %
INTERPRETATION: NORMAL
LYMPHOCYTES # BLD: 0.4 K/UL (ref 0.8–3.5)
LYMPHOCYTES NFR BLD: 6 % (ref 12–49)
MAGNESIUM SERPL-MCNC: 2 MG/DL (ref 1.6–2.4)
MAGNESIUM SERPL-MCNC: 2.1 MG/DL (ref 1.6–2.4)
MCH RBC QN AUTO: 31 PG (ref 26–34)
MCHC RBC AUTO-ENTMCNC: 34.2 G/DL (ref 30–36.5)
MCV RBC AUTO: 90.8 FL (ref 80–99)
METAMYELOCYTES NFR BLD MANUAL: 0 %
MONOCYTES # BLD: 0.3 K/UL (ref 0–1)
MONOCYTES NFR BLD: 5 % (ref 5–13)
MYELOCYTES NFR BLD MANUAL: 0 %
NEUTS BAND NFR BLD MANUAL: 0 % (ref 0–6)
NEUTS SEG # BLD: 5.6 K/UL (ref 1.8–8)
NEUTS SEG NFR BLD: 89 % (ref 32–75)
NRBC # BLD: 0.02 K/UL (ref 0–0.01)
NRBC BLD-RTO: 0.3 PER 100 WBC
OTHER CELLS NFR BLD MANUAL: 0 %
PLATELET # BLD AUTO: 238 K/UL (ref 150–400)
PMV BLD AUTO: 10.2 FL (ref 8.9–12.9)
POTASSIUM SERPL-SCNC: 3.5 MMOL/L (ref 3.5–5.1)
POTASSIUM SERPL-SCNC: 3.6 MMOL/L (ref 3.5–5.1)
PROMYELOCYTES NFR BLD MANUAL: 0 %
PROT SERPL-MCNC: 5.4 G/DL (ref 6.4–8.2)
RBC # BLD AUTO: 3.48 M/UL (ref 3.8–5.2)
RBC MORPH BLD: ABNORMAL
SERVICE CMNT-IMP: ABNORMAL
SERVICE CMNT-IMP: NORMAL
SODIUM SERPL-SCNC: 141 MMOL/L (ref 136–145)
SODIUM SERPL-SCNC: 142 MMOL/L (ref 136–145)
WBC # BLD AUTO: 6.3 K/UL (ref 3.6–11)

## 2020-04-24 PROCEDURE — 74011636637 HC RX REV CODE- 636/637: Performed by: INTERNAL MEDICINE

## 2020-04-24 PROCEDURE — 74011000258 HC RX REV CODE- 258: Performed by: INTERNAL MEDICINE

## 2020-04-24 PROCEDURE — 74011250637 HC RX REV CODE- 250/637: Performed by: HOSPITALIST

## 2020-04-24 PROCEDURE — 36415 COLL VENOUS BLD VENIPUNCTURE: CPT

## 2020-04-24 PROCEDURE — 85027 COMPLETE CBC AUTOMATED: CPT

## 2020-04-24 PROCEDURE — 83880 ASSAY OF NATRIURETIC PEPTIDE: CPT

## 2020-04-24 PROCEDURE — 80053 COMPREHEN METABOLIC PANEL: CPT

## 2020-04-24 PROCEDURE — 82962 GLUCOSE BLOOD TEST: CPT

## 2020-04-24 PROCEDURE — 83735 ASSAY OF MAGNESIUM: CPT

## 2020-04-24 PROCEDURE — 74011250636 HC RX REV CODE- 250/636: Performed by: INTERNAL MEDICINE

## 2020-04-24 PROCEDURE — 65660000000 HC RM CCU STEPDOWN

## 2020-04-24 PROCEDURE — 74011250636 HC RX REV CODE- 250/636: Performed by: HOSPITALIST

## 2020-04-24 RX ORDER — INSULIN LISPRO 100 [IU]/ML
4 INJECTION, SOLUTION INTRAVENOUS; SUBCUTANEOUS
Status: DISCONTINUED | OUTPATIENT
Start: 2020-04-24 | End: 2020-04-25 | Stop reason: HOSPADM

## 2020-04-24 RX ORDER — INSULIN GLARGINE 100 [IU]/ML
24 INJECTION, SOLUTION SUBCUTANEOUS DAILY
Status: DISCONTINUED | OUTPATIENT
Start: 2020-04-24 | End: 2020-04-25

## 2020-04-24 RX ADMIN — HEPARIN SODIUM 5000 UNITS: 5000 INJECTION INTRAVENOUS; SUBCUTANEOUS at 15:06

## 2020-04-24 RX ADMIN — INSULIN LISPRO 4 UNITS: 100 INJECTION, SOLUTION INTRAVENOUS; SUBCUTANEOUS at 17:45

## 2020-04-24 RX ADMIN — INSULIN LISPRO 4 UNITS: 100 INJECTION, SOLUTION INTRAVENOUS; SUBCUTANEOUS at 12:49

## 2020-04-24 RX ADMIN — HEPARIN SODIUM 5000 UNITS: 5000 INJECTION INTRAVENOUS; SUBCUTANEOUS at 21:05

## 2020-04-24 RX ADMIN — INSULIN LISPRO 2 UNITS: 100 INJECTION, SOLUTION INTRAVENOUS; SUBCUTANEOUS at 17:45

## 2020-04-24 RX ADMIN — CLOPIDOGREL BISULFATE 75 MG: 75 TABLET ORAL at 09:23

## 2020-04-24 RX ADMIN — DEXTROSE, SODIUM CHLORIDE, AND POTASSIUM CHLORIDE 100 ML/HR: 5; .45; .15 INJECTION INTRAVENOUS at 09:22

## 2020-04-24 RX ADMIN — PRAVASTATIN SODIUM 80 MG: 40 TABLET ORAL at 21:06

## 2020-04-24 RX ADMIN — INSULIN GLARGINE 24 UNITS: 100 INJECTION, SOLUTION SUBCUTANEOUS at 09:23

## 2020-04-24 RX ADMIN — LEVOTHYROXINE SODIUM 175 MCG: 0.07 TABLET ORAL at 05:47

## 2020-04-24 RX ADMIN — DEXTROSE, SODIUM CHLORIDE, AND POTASSIUM CHLORIDE 150 ML/HR: 5; .45; .15 INJECTION INTRAVENOUS at 02:45

## 2020-04-24 RX ADMIN — SODIUM CHLORIDE 2 UNITS/HR: 9 INJECTION, SOLUTION INTRAVENOUS at 09:18

## 2020-04-24 RX ADMIN — METOPROLOL SUCCINATE 25 MG: 25 TABLET, EXTENDED RELEASE ORAL at 09:23

## 2020-04-24 RX ADMIN — INSULIN LISPRO 1 UNITS: 100 INJECTION, SOLUTION INTRAVENOUS; SUBCUTANEOUS at 12:49

## 2020-04-24 RX ADMIN — Medication 10 ML: at 05:47

## 2020-04-24 RX ADMIN — Medication 10 ML: at 22:00

## 2020-04-24 RX ADMIN — HEPARIN SODIUM 5000 UNITS: 5000 INJECTION INTRAVENOUS; SUBCUTANEOUS at 05:47

## 2020-04-24 RX ADMIN — POTASSIUM CHLORIDE: 2 INJECTION, SOLUTION, CONCENTRATE INTRAVENOUS at 11:28

## 2020-04-24 RX ADMIN — Medication 10 ML: at 15:06

## 2020-04-24 NOTE — PROGRESS NOTES
Bedside and Verbal shift change report given to 2525 Robbin French (oncoming nurse) by Salome Lovell (offgoing nurse). Report included the following information SBAR, ED Summary, Intake/Output, MAR, Cardiac Rhythm NSR and Alarm Parameters . 0314 Patient still on insulin drip, MD saw pt and placed new orders for bridge. Insulin drip order got changed Drip had just been resumed to 0.1 units/hr. Waiting on new bag from pharmacy. 1781 insulin drip changed to 2 units/hr    0923 Lantus 24 units given to bridge for insulin drip. Drip change to 2.0 units/hr     1035 insulin drip changed to 4.9 units/hr    1130 transferred pt off insulin drip    1400 pt ambulated to bathroom and then placed in chair sitting in chair with alarm on    Bedside and Verbal shift change report given to Jovanni Castellanos (oncoming nurse) by Dashawn Morales (offgoing nurse). Report included the following information SBAR, Kardex, Procedure Summary, Recent Results, Cardiac Rhythm . and Alarm Parameters .

## 2020-04-24 NOTE — PROGRESS NOTES
Problem: Pressure Injury - Risk of  Goal: *Prevention of pressure injury  Description: Document Madhu Scale and appropriate interventions in the flowsheet. Outcome: Progressing Towards Goal  Note: Pressure Injury Interventions:  Sensory Interventions: Assess changes in LOC, Check visual cues for pain, Minimize linen layers, Keep linens dry and wrinkle-free, Float heels    Moisture Interventions: Absorbent underpads, Check for incontinence Q2 hours and as needed    Activity Interventions: Pressure redistribution bed/mattress(bed type)    Mobility Interventions: Pressure redistribution bed/mattress (bed type)    Nutrition Interventions: Document food/fluid/supplement intake                     Problem: Falls - Risk of  Goal: *Absence of Falls  Description: Document Tyler Fall Risk and appropriate interventions in the flowsheet.   Outcome: Progressing Towards Goal  Note: Fall Risk Interventions:  Mobility Interventions: Patient to call before getting OOB, Bed/chair exit alarm, Strengthening exercises (ROM-active/passive), Utilize walker, cane, or other assistive device, PT Consult for assist device competence, PT Consult for mobility concerns    Mentation Interventions: Bed/chair exit alarm, Adequate sleep, hydration, pain control, Door open when patient unattended, More frequent rounding, Reorient patient    Medication Interventions: Assess postural VS orthostatic hypotension, Bed/chair exit alarm    Elimination Interventions: Bed/chair exit alarm, Call light in reach              Problem: DKA: Day 2  Goal: Activity/Safety  Outcome: Progressing Towards Goal     Problem: DKA: Day 2  Goal: Medications  Outcome: Progressing Towards Goal     Problem: DKA: Day 2  Goal: Treatments/Interventions/Procedures  Outcome: Progressing Towards Goal

## 2020-04-24 NOTE — PROGRESS NOTES
I have reviewed all assessments and documentation provided by Fredo Hernandez RN and agree with the documentation provided.      Sharla Headley RN

## 2020-04-24 NOTE — PROGRESS NOTES
General Daily Progress Note    Admit Date: 4/23/2020    Subjective:     Patient has no complaint . Janiebenjamin [place] Current Facility-Administered Medications   Medication Dose Route Frequency    insulin glargine (LANTUS) injection 24 Units  24 Units SubCUTAneous DAILY    insulin regular (NOVOLIN R, HUMULIN R) 100 Units in 0.9% sodium chloride 100 mL infusion  0-50 Units/hr IntraVENous TITRATE    insulin lispro (HUMALOG) injection 4 Units  4 Units SubCUTAneous TIDAC    ondansetron (ZOFRAN) injection 4 mg  4 mg IntraVENous Q1H PRN    glucose chewable tablet 16 g  4 Tab Oral PRN    dextrose (D50W) injection syrg 12.5-25 g  25-50 mL IntraVENous PRN    glucagon (GLUCAGEN) injection 1 mg  1 mg IntraMUSCular PRN    acetaminophen (TYLENOL) tablet 650 mg  650 mg Oral Q6H PRN    ondansetron (ZOFRAN) injection 4 mg  4 mg IntraVENous Q4H PRN    sodium chloride (NS) flush 5-40 mL  5-40 mL IntraVENous Q8H    sodium chloride (NS) flush 5-40 mL  5-40 mL IntraVENous PRN    acetaminophen (TYLENOL) tablet 650 mg  650 mg Oral Q4H PRN    ondansetron (ZOFRAN) injection 4 mg  4 mg IntraVENous Q4H PRN    heparin (porcine) injection 5,000 Units  5,000 Units SubCUTAneous Q8H    clopidogreL (PLAVIX) tablet 75 mg  75 mg Oral DAILY    levothyroxine (SYNTHROID) tablet 175 mcg  175 mcg Oral 6am    metoprolol succinate (TOPROL-XL) XL tablet 25 mg  25 mg Oral DAILY    pravastatin (PRAVACHOL) tablet 80 mg  80 mg Oral QHS    insulin lispro (HUMALOG) injection   SubCUTAneous TIDAC    dextrose 5% - 0.45% NaCl with KCl 20 mEq/L infusion  100 mL/hr IntraVENous CONTINUOUS        Review of Systems  A comprehensive review of systems was negative.     Objective:     Patient Vitals for the past 24 hrs:   BP Temp Pulse Resp SpO2 Weight   04/24/20 0741 144/48 98.1 °F (36.7 °C) 86 24 99 %    04/24/20 0430      146 lb (66.2 kg)   04/24/20 0303 130/58 98.6 °F (37 °C) 85 21 99 %    04/23/20 2316 117/46 98.8 °F (37.1 °C) 82 18 99 %    04/23/20 1950 131/48 99.4 °F (37.4 °C) 87 19 100 %    04/23/20 1624 120/57 99 °F (37.2 °C) 86 20 100 %    04/23/20 1042 137/50 97.4 °F (36.3 °C) 86 24 100 %      No intake/output data recorded. 04/22 1901 - 04/24 0700  In: 2160 [P.O.:160; I.V.:2000]  Out: -     Physical Exam:   Visit Vitals  /48   Pulse 86   Temp 98.1 °F (36.7 °C)   Resp 24   Ht 5' 3\" (1.6 m)   Wt 146 lb (66.2 kg)   SpO2 99%   BMI 25.86 kg/m²     General appearance: alert, cooperative, no distress, appears stated age  Neck: supple, symmetrical, trachea midline, no adenopathy, thyroid: not enlarged, symmetric, no tenderness/mass/nodules, no carotid bruit and no JVD  Lungs: clear to auscultation bilaterally  Heart: regular rate and rhythm, S1, S2 normal, no murmur, click, rub or gallop  Abdomen: soft, non-tender. Bowel sounds normal. No masses,  no organomegaly  Extremities: extremities normal, atraumatic, no cyanosis or edema    Assessment:     Active Problems:    DKA (diabetic ketoacidoses) ([facility] [address] (6/2/2017)        Plan:     1. DKA appears to have resolved. We will now transition to basal prandial insulin. Diet resumed. Her home situation as relates to her diabetes unfortunately will not change. 2.  She is well-hydrated with resumption of normal kidney function. 3.  No volume overload.

## 2020-04-24 NOTE — CDMP QUERY
Pt admitted with DKA Pt noted to have PMH of  heart failure. If possible, please document in progress notes and d/c summary if you are evaluating and /or treating any of the following: 
  
? Chronic Systolic CHF ? Chronic Diastolic CHF ? Chronic Systolic and Diastolic CHF 
? Other, please specify ? Clinically unable to determine The medical record reflects the following: 
  Risk Factors: presents with DKA, dementia Clinical Indicators: noted in HP and PMH; heart failure- unknown to family Treatment: I/Os, daily weights, toprol xl Thank you Coral Stewart RN/CCDS 
[phone]

## 2020-04-24 NOTE — PROGRESS NOTES
Dr. Yee Leonardo paged via [employer] phone call to communicate pt blood sugars up into low 300s. RN requested for updated sliding scale insulin orders. Awaiting reply. Pt resting in bed, VSS.

## 2020-04-24 NOTE — PROGRESS NOTES
Problem: Falls - Risk of  Goal: *Absence of Falls  Description: Document Silveira Reason Fall Risk and appropriate interventions in the flowsheet. Outcome: Progressing Towards Goal  Note: Fall Risk Interventions:  Mobility Interventions: Bed/chair exit alarm, Patient to call before getting OOB    Mentation Interventions: Bed/chair exit alarm, Adequate sleep, hydration, pain control, Door open when patient unattended, Reorient patient    Medication Interventions: Bed/chair exit alarm, Patient to call before getting OOB    Elimination Interventions: Call light in reach, Bed/chair exit alarm    History of Falls Interventions: Bed/chair exit alarm, Room close to nurse's station         Problem: Patient Education: Go to Patient Education Activity  Goal: Patient/Family Education  Outcome: Progressing Towards Goal     Problem: Pressure Injury - Risk of  Goal: *Prevention of pressure injury  Description: Document Madhu Scale and appropriate interventions in the flowsheet.   Outcome: Progressing Towards Goal  Note: Pressure Injury Interventions:  Sensory Interventions: Assess changes in LOC, Keep linens dry and wrinkle-free, Minimize linen layers, Maintain/enhance activity level, Monitor skin under medical devices    Moisture Interventions: Absorbent underpads, Check for incontinence Q2 hours and as needed, Minimize layers    Activity Interventions: Pressure redistribution bed/mattress(bed type)    Mobility Interventions: PT/OT evaluation, Pressure redistribution bed/mattress (bed type)    Nutrition Interventions: Document food/fluid/supplement intake                     Problem: Patient Education: Go to Patient Education Activity  Goal: Patient/Family Education  Outcome: Progressing Towards Goal     Problem: Nutrition Deficit  Goal: *Optimize nutritional status  Outcome: Progressing Towards Goal     Problem: Risk for Spread of Infection  Goal: Prevent transmission of infectious organism to others  Description: Prevent the transmission of infectious organisms to other patients, staff members, and visitors.   Outcome: Resolved/Met     Problem: Patient Education:  Go to Education Activity  Goal: Patient/Family Education  Outcome: Resolved/Met     Problem: DKA: Day 1  Goal: Off Pathway (Use only if patient is Off Pathway)  Outcome: Resolved/Met  Goal: Activity/Safety  Outcome: Resolved/Met  Goal: Consults, if ordered  Outcome: Resolved/Met  Goal: Diagnostic Tests/Procedures, if Ordered  Outcome: Resolved/Met  Goal: Nutrition/Diet  Outcome: Resolved/Met  Goal: Discharge Planning  Outcome: Resolved/Met  Goal: Medications  Outcome: Resolved/Met  Goal: Respiratory  Outcome: Resolved/Met  Goal: Treatments/Interventions/Procedures  Outcome: Resolved/Met  Goal: Psychosocial  Outcome: Resolved/Met  Goal: *Hemodynamically stable  Outcome: Resolved/Met  Goal: *Blood glucose falling 50 to 100 mg/dl/hr  Outcome: Resolved/Met  Goal: *Potassium normalizing  Outcome: Resolved/Met     Problem: DKA: Day 2  Goal: Off Pathway (Use only if patient is Off Pathway)  Outcome: Resolved/Met  Goal: Activity/Safety  Outcome: Resolved/Met  Goal: Consults, if ordered  Outcome: Resolved/Met  Goal: Diagnostic Test/Procedures  Outcome: Resolved/Met  Goal: Nutrition/Diet  Outcome: Resolved/Met  Goal: Discharge Planning  Outcome: Resolved/Met  Goal: Medications  Outcome: Resolved/Met  Goal: Respiratory  Outcome: Resolved/Met  Goal: Treatments/Interventions/Procedures  Outcome: Resolved/Met  Goal: Psychosocial  Outcome: Resolved/Met  Goal: *Acidosis resolved  Outcome: Resolved/Met  Goal: *Tolerating diet  Outcome: Resolved/Met  Goal: *Demonstrates progressive activity  Outcome: Resolved/Met     Problem: DKA: Day 3  Goal: Off Pathway (Use only if patient is Off Pathway)  Outcome: Resolved/Met  Goal: Activity/Safety  Outcome: Resolved/Met  Goal: Diagnostic Test/Procedures  Outcome: Resolved/Met  Goal: Nutrition/Diet  Outcome: Resolved/Met  Goal: Discharge Planning  Outcome: Resolved/Met  Goal: Medications  Outcome: Resolved/Met  Goal: Treatments/Interventions/Procedures  Outcome: Resolved/Met  Goal: Psychosocial  Outcome: Resolved/Met     Problem: DKA: Discharge Outcomes  Goal: *Ambulates and performs ADL's  Outcome: Resolved/Met  Goal: *Describes follow-up/return visits to physicians, diabetes treatment coordinator and other resources  Outcome: Resolved/Met  Goal: *Blood glucose at patient's target range  Outcome: Resolved/Met  Goal: *Acidosis resolved  Outcome: Resolved/Met  Goal: *Tolerating diet  Outcome: Resolved/Met  Goal: *Verbalizes understanding and describes prescribed diet  Outcome: Resolved/Met  Goal: *Describes blood glucose goals, monitoring, sick day rules, hypo/hyperglycemia  Outcome: Resolved/Met  Goal: *Describes available resources and support systems  Outcome: Resolved/Met  Goal: *Verbalizes name, dosage, time, side effects, and number of days to continue medications  Outcome: Resolved/Met  Goal: *Demonstrates ability to self-administer insulin  Outcome: Resolved/Met

## 2020-04-25 ENCOUNTER — HOME HEALTH ADMISSION (OUTPATIENT)
Dept: HOME HEALTH SERVICES | Facility: HOME HEALTH | Age: 80
End: 2020-04-25
Payer: MEDICARE

## 2020-04-25 VITALS
HEIGHT: 63 IN | DIASTOLIC BLOOD PRESSURE: 71 MMHG | TEMPERATURE: 97.8 F | SYSTOLIC BLOOD PRESSURE: 131 MMHG | WEIGHT: 146 LBS | BODY MASS INDEX: 25.87 KG/M2 | OXYGEN SATURATION: 100 % | HEART RATE: 75 BPM | RESPIRATION RATE: 23 BRPM

## 2020-04-25 LAB
ANION GAP SERPL CALC-SCNC: 7 MMOL/L (ref 5–15)
BUN SERPL-MCNC: 14 MG/DL (ref 6–20)
BUN/CREAT SERPL: 18 (ref 12–20)
CALCIUM SERPL-MCNC: 7.7 MG/DL (ref 8.5–10.1)
CHLORIDE SERPL-SCNC: 110 MMOL/L (ref 97–108)
CO2 SERPL-SCNC: 22 MMOL/L (ref 21–32)
CREAT SERPL-MCNC: 0.77 MG/DL (ref 0.55–1.02)
GLUCOSE BLD STRIP.AUTO-MCNC: 133 MG/DL (ref 65–100)
GLUCOSE BLD STRIP.AUTO-MCNC: 178 MG/DL (ref 65–100)
GLUCOSE SERPL-MCNC: 127 MG/DL (ref 65–100)
MAGNESIUM SERPL-MCNC: 1.8 MG/DL (ref 1.6–2.4)
POTASSIUM SERPL-SCNC: 3.5 MMOL/L (ref 3.5–5.1)
SERVICE CMNT-IMP: ABNORMAL
SERVICE CMNT-IMP: ABNORMAL
SODIUM SERPL-SCNC: 139 MMOL/L (ref 136–145)

## 2020-04-25 PROCEDURE — 83735 ASSAY OF MAGNESIUM: CPT

## 2020-04-25 PROCEDURE — 74011636637 HC RX REV CODE- 636/637: Performed by: INTERNAL MEDICINE

## 2020-04-25 PROCEDURE — 74011250636 HC RX REV CODE- 250/636: Performed by: INTERNAL MEDICINE

## 2020-04-25 PROCEDURE — 82962 GLUCOSE BLOOD TEST: CPT

## 2020-04-25 PROCEDURE — 74011250636 HC RX REV CODE- 250/636: Performed by: HOSPITALIST

## 2020-04-25 PROCEDURE — 74011250637 HC RX REV CODE- 250/637: Performed by: HOSPITALIST

## 2020-04-25 PROCEDURE — 36415 COLL VENOUS BLD VENIPUNCTURE: CPT

## 2020-04-25 PROCEDURE — 74011000258 HC RX REV CODE- 258: Performed by: INTERNAL MEDICINE

## 2020-04-25 PROCEDURE — 80048 BASIC METABOLIC PNL TOTAL CA: CPT

## 2020-04-25 RX ORDER — INSULIN GLARGINE 100 [IU]/ML
20 INJECTION, SOLUTION SUBCUTANEOUS DAILY
Status: DISCONTINUED | OUTPATIENT
Start: 2020-04-25 | End: 2020-04-25 | Stop reason: HOSPADM

## 2020-04-25 RX ORDER — INSULIN DEGLUDEC INJECTION 100 U/ML
18 INJECTION, SOLUTION SUBCUTANEOUS DAILY
Qty: 2 ADJUSTABLE DOSE PRE-FILLED PEN SYRINGE | Refills: 11 | Status: ON HOLD
Start: 2020-04-25 | End: 2020-06-25 | Stop reason: SDUPTHER

## 2020-04-25 RX ADMIN — METOPROLOL SUCCINATE 25 MG: 25 TABLET, EXTENDED RELEASE ORAL at 09:22

## 2020-04-25 RX ADMIN — CLOPIDOGREL BISULFATE 75 MG: 75 TABLET ORAL at 09:21

## 2020-04-25 RX ADMIN — POTASSIUM CHLORIDE: 2 INJECTION, SOLUTION, CONCENTRATE INTRAVENOUS at 05:33

## 2020-04-25 RX ADMIN — INSULIN GLARGINE 20 UNITS: 100 INJECTION, SOLUTION SUBCUTANEOUS at 12:42

## 2020-04-25 RX ADMIN — INSULIN LISPRO 4 UNITS: 100 INJECTION, SOLUTION INTRAVENOUS; SUBCUTANEOUS at 09:21

## 2020-04-25 RX ADMIN — Medication 10 ML: at 05:33

## 2020-04-25 RX ADMIN — INSULIN GLARGINE 24 UNITS: 100 INJECTION, SOLUTION SUBCUTANEOUS at 09:21

## 2020-04-25 RX ADMIN — INSULIN LISPRO 4 UNITS: 100 INJECTION, SOLUTION INTRAVENOUS; SUBCUTANEOUS at 12:42

## 2020-04-25 RX ADMIN — HEPARIN SODIUM 5000 UNITS: 5000 INJECTION INTRAVENOUS; SUBCUTANEOUS at 05:29

## 2020-04-25 RX ADMIN — LEVOTHYROXINE SODIUM 175 MCG: 0.07 TABLET ORAL at 05:29

## 2020-04-25 NOTE — DISCHARGE INSTRUCTIONS
General Discharge Instructions    Patient ID:  Marysol Rios  [id number]  67 y.o.  1940    Patient Instructions          The following personal items were collected during your admission and were returned to you. Take Home Medications             What to do at Home    Recommended diet: Diabetic Diet with bedtime snack    Recommended activity: Activity as tolerated    Follow-up with Heena Vega MD  in 5 days. Information obtained by :  I understand that if any problems occur once I am at home I am to contact my physician. I understand and acknowledge receipt of the instructions indicated above.                                                                                                                                            Physician's or R.N.'s Signature                                                                  Date/Time                                                                                                                                              Patient or Representative Signature                                                          Date/Time

## 2020-04-25 NOTE — PROGRESS NOTES
JUNIOR spoke with the patient's daughter, David Cardenas (phone: [phone]), to discuss dispo plan. CM discussed [facility] services with the patient's daughter and she does feel that the patient would benefit from [facility] services. She has used [facility] in the past and the patient's daughter would like to use [facility] again. CM has sent the patient's referral to [facility] for review. CM discussed with the patient's daughter her plan for d/c transportation and the patient's daughter stated that [place] [facility] [place] [facility] [place] [facility] [address] [facility]. CM will continue to assist with dispo planning. 11:05 AM-JUNIOR confirmed that [facility]  has accepted the patient for [facility] services.  JUNIOR added the contact information for [facility]  to the patient's [address], LCSW

## 2020-04-25 NOTE — PROGRESS NOTES
Assessment done, Patient sitting up in chair at bedside. Chair alarm on, patient is oriented x 2. Patient back to bed at 2300. Bedside commode offered and bath given. Denies having any pain. Report given to Felix Tiwari.

## 2020-04-26 NOTE — DISCHARGE SUMMARY
[address] SUMMARY    Name:  Yasmine Bell  MR#:  [id number]  :  1940  ACCOUNT #:  [de-identified]  ADMIT DATE:  2020  DISCHARGE DATE:  2020      HISTORY OF PRESENT ILLNESS:  The patient is a 52-year-old lady with moderate dementia and type 1b diabetes, presented to the emergency room with a 24-48 hours history of increasing weakness, fatigue, and confusion. She was evaluated and found to have diabetic ketoacidosis with CO2 of less than 5, blood sugar 810, BUN and creatinine 28 and 1.76 respectively. Lactic acid on admission was 4.2 and repeat at 5 hours later 3.7 and 13 hours later 1.0. Pro-BNP initially is 1934 and repeat was 1003. At the time of discharge, BUN and creatinine were 14 and 0.77 respectively with a CO2 of 22. Urinalysis was essentially normal other than 80 mg/dL ketonuria and greater than 1000 mg/dL glycosuria. Stool for C. difficile toxin was obtained, results were not yet back at the time of discharge. RADIOGRAPHS:  No radiographs were done. HOSPITAL COURSE:  The patient was admitted and placed on the PCU on an insulin drip at 50 units/50 mL and 1 unit/mL with titration according to protocol. She remained on the insulin drip for approximately 30 hours in order to break the acidosis. She was subsequently transitioned to her basal insulin with planned basal insulin with prandial coverage. Concomitant hydration occurred with normalization of her prerenal azotemia. Her confusional state improved rather rapidly such that at the time of discharge she was back to baseline. There did not appear to be any complicating features precipitating the ketoacidosis other than a long history of noncompliance with inadequate supervision at home, which is created by her progressive dementia. FINAL DIAGNOSES:  1. Diabetic ketoacidosis. 2.  Diabetes mellitus type 1b. 3.  Progressive dementia. 4.  Primary hypertension. 5.  Dyslipidemia.   6. Hypothyroidism. DISPOSITION:  1. The patient will be discharged home and ambulatory on an ADA diet with bedtime snack. I emphasized to the patient and family, the importance of eating at the same time everyday. This minimizes the probability of her developing hypoglycemia and reduces glycemic variability modestly. 2.  Discharge medications include the following:  Tresiba 18 units every morning, lispro 4 units a.c. meals, Alphagan 0.15% one drop both eyes b.i.d., pravastatin 80 mg at bedtime, amlodipine 10 mg daily, clopidogrel 75 mg daily, Levoxyl 0.175 mg daily, metoprolol succinate 25 mg daily. 3.  The patient was given a FreeStyle Mayra to facilitate diabetic control, but I am yet to have the family follow through enough such that she could get the device. Continuous glucose monitoring is mandatory frankly. 4.  She will return to the office in the next 5 days.         Reyes Emmer, MD      [id number]  D:  04/25/2020 9:50  T:  04/25/2020 23:20  JOB #:  [id number]

## 2020-04-27 ENCOUNTER — HOME CARE VISIT (OUTPATIENT)
Dept: SCHEDULING | Facility: HOME HEALTH | Age: 80
End: 2020-04-27
Payer: MEDICARE

## 2020-04-27 ENCOUNTER — PATIENT OUTREACH (OUTPATIENT)
Dept: FAMILY MEDICINE CLINIC | Age: 80
End: 2020-04-27

## 2020-04-27 ENCOUNTER — HOME CARE VISIT (OUTPATIENT)
Dept: HOME HEALTH SERVICES | Facility: HOME HEALTH | Age: 80
End: 2020-04-27

## 2020-04-27 PROCEDURE — 400013 HH SOC

## 2020-04-27 PROCEDURE — 3331090002 HH PPS REVENUE DEBIT

## 2020-04-27 PROCEDURE — 3331090001 HH PPS REVENUE CREDIT

## 2020-04-27 PROCEDURE — G0299 HHS/HOSPICE OF RN EA 15 MIN: HCPCS

## 2020-04-27 NOTE — PROGRESS NOTES
Patient contacted regarding recent discharge and COVID-19 risk     Care Transition Nurse/ Ambulatory Care Manager contacted the pt's dtr Mehdi by telephone to perform post discharge assessment. Verified name and  with pt's dtr Kerri Zamarripa  as identifiers. Patient has following risk factors of: heart failure, diabetes and HTN, Vascular Dementia (progressing) CTN/ACM reviewed discharge instructions, medical action plan and red flags related to discharge diagnosis. Reviewed and educated them on any new and changed medications related to discharge diagnosis. Advised obtaining a 90-day supply of all daily and as-needed medications. Pt's dtr Kerri Zamarripa confirmed new Tresiba dose is 18 units daily & continuation of Novolog Insulin 4 units AC meals. Dtr reported will resume filling pt's pill box. Dtr will wear a mask when visiting pt. Dtr reported pt currently not checking BG. Unable to locate Glucometer & pt forgot technique. CTN discussed Freestyle Mayra 14 Day [place] & Sensor. Dtr reported PCP ordered but [employer] denied. Copy of [address] Guide for [email or web address] system sent to PCP for review. Dtr confirmed [facility] SN visiting w/ pt prior to CTN call. PCP f/u appt scheduled thru [facility] Perera for Virtual Visit on 20 @ 4:30 PM. Dtr inquired re medication for \"slowling down Dementia\". CTN advised she discuss w/ PCP during 20 Virtual Visit. Education provided regarding infection prevention, and signs and symptoms of COVID-19 and when to seek medical attention with pt's dtr Kerri Zamarripa  who verbalized understanding. Discussed exposure protocols and quarantine from [address] you at higher risk for severe illness  and given an opportunity for questions and concerns. The pt's dtr Kerri Zamarripa  agrees to contact the COVID-19 hotline [phone] or PCP office for questions related to their healthcare. CTN/ACM provided contact information for future reference.     From CDC: Are you at higher risk for severe illness?  Wash your hands often.  Avoid close contact (6 feet, which is about two arm lengths) with people who are sick.  Put distance between yourself and other people if COVID-19 is spreading in your community.  Clean and disinfect frequently touched surfaces.  Avoid all cruise travel and non-essential air travel.  Call your healthcare professional if you have concerns about COVID-19 and your underlying condition or if you are sick. For more information on steps you can take to protect yourself, see CDC's How to Protect Yourself      Patient/family/caregiver given information for [employer] Loop and agrees to enroll no  Patient's preferred e-mail:  n/a  Patient's preferred phone number: n/a  Based on Loop alert triggers, patient will be contacted by nurse care manager for worsening symptoms. Plan for follow-up call in 7-14 days based on severity of symptoms and risk factors.

## 2020-04-28 ENCOUNTER — HOME CARE VISIT (OUTPATIENT)
Dept: SCHEDULING | Facility: HOME HEALTH | Age: 80
End: 2020-04-28
Payer: MEDICARE

## 2020-04-28 ENCOUNTER — HOME CARE VISIT (OUTPATIENT)
Dept: HOME HEALTH SERVICES | Facility: HOME HEALTH | Age: 80
End: 2020-04-28
Payer: MEDICARE

## 2020-04-28 VITALS
OXYGEN SATURATION: 98 % | RESPIRATION RATE: 16 BRPM | SYSTOLIC BLOOD PRESSURE: 160 MMHG | TEMPERATURE: 97.1 F | DIASTOLIC BLOOD PRESSURE: 70 MMHG | HEART RATE: 66 BPM

## 2020-04-28 VITALS
TEMPERATURE: 98 F | OXYGEN SATURATION: 98 % | SYSTOLIC BLOOD PRESSURE: 130 MMHG | DIASTOLIC BLOOD PRESSURE: 68 MMHG | WEIGHT: 145 LBS | RESPIRATION RATE: 18 BRPM | HEIGHT: 62 IN | BODY MASS INDEX: 26.68 KG/M2 | HEART RATE: 72 BPM

## 2020-04-28 PROCEDURE — G0151 HHCP-SERV OF PT,EA 15 MIN: HCPCS

## 2020-04-28 PROCEDURE — 3331090002 HH PPS REVENUE DEBIT

## 2020-04-28 PROCEDURE — 3331090001 HH PPS REVENUE CREDIT

## 2020-04-29 ENCOUNTER — HOME CARE VISIT (OUTPATIENT)
Dept: HOME HEALTH SERVICES | Facility: HOME HEALTH | Age: 80
End: 2020-04-29
Payer: MEDICARE

## 2020-04-29 ENCOUNTER — HOME CARE VISIT (OUTPATIENT)
Dept: SCHEDULING | Facility: HOME HEALTH | Age: 80
End: 2020-04-29
Payer: MEDICARE

## 2020-04-29 VITALS
SYSTOLIC BLOOD PRESSURE: 136 MMHG | DIASTOLIC BLOOD PRESSURE: 70 MMHG | HEART RATE: 81 BPM | TEMPERATURE: 98.1 F | OXYGEN SATURATION: 97 %

## 2020-04-29 VITALS
TEMPERATURE: 98.8 F | HEART RATE: 83 BPM | SYSTOLIC BLOOD PRESSURE: 130 MMHG | RESPIRATION RATE: 18 BRPM | OXYGEN SATURATION: 98 % | DIASTOLIC BLOOD PRESSURE: 78 MMHG

## 2020-04-29 PROCEDURE — G0152 HHCP-SERV OF OT,EA 15 MIN: HCPCS

## 2020-04-29 PROCEDURE — 3331090001 HH PPS REVENUE CREDIT

## 2020-04-29 PROCEDURE — G0299 HHS/HOSPICE OF RN EA 15 MIN: HCPCS

## 2020-04-29 PROCEDURE — 3331090002 HH PPS REVENUE DEBIT

## 2020-04-30 ENCOUNTER — VIRTUAL VISIT (OUTPATIENT)
Dept: INTERNAL MEDICINE CLINIC | Age: 80
End: 2020-04-30

## 2020-04-30 DIAGNOSIS — E10.65 HYPERGLYCEMIA DUE TO TYPE 1 DIABETES MELLITUS (HCC): Primary | ICD-10-CM

## 2020-04-30 DIAGNOSIS — F01.50 VASCULAR DEMENTIA WITHOUT BEHAVIORAL DISTURBANCE (HCC): ICD-10-CM

## 2020-04-30 DIAGNOSIS — N17.9 AKI (ACUTE KIDNEY INJURY) (HCC): ICD-10-CM

## 2020-04-30 DIAGNOSIS — E03.2 HYPOTHYROIDISM DUE TO MEDICATION: ICD-10-CM

## 2020-04-30 PROCEDURE — 3331090002 HH PPS REVENUE DEBIT

## 2020-04-30 PROCEDURE — 3331090001 HH PPS REVENUE CREDIT

## 2020-04-30 NOTE — PROGRESS NOTES
Chief Complaint   Patient presents with    Transitions Of Care     1. Have you been to the ER, urgent care clinic since your last visit? Hospitalized since your last visit? Yes When: 04/21/2020-04/25/2020 [facility] [facility] Reason for visit: Diabetes     2. Have you seen or consulted any other health care providers outside of the [address] [place] [address] since your last visit? Include any pap smears or colon screening.  No

## 2020-05-01 ENCOUNTER — HOME CARE VISIT (OUTPATIENT)
Dept: HOME HEALTH SERVICES | Facility: HOME HEALTH | Age: 80
End: 2020-05-01
Payer: MEDICARE

## 2020-05-01 ENCOUNTER — HOME CARE VISIT (OUTPATIENT)
Dept: SCHEDULING | Facility: HOME HEALTH | Age: 80
End: 2020-05-01
Payer: MEDICARE

## 2020-05-01 PROCEDURE — 3331090002 HH PPS REVENUE DEBIT

## 2020-05-01 PROCEDURE — 3331090001 HH PPS REVENUE CREDIT

## 2020-05-01 PROCEDURE — G0299 HHS/HOSPICE OF RN EA 15 MIN: HCPCS

## 2020-05-01 NOTE — PROGRESS NOTES
Felicity Hawk is a 67 y.o. female who was seen by synchronous (real-time) audio-video technology on 4/30/2020. Consent: Felicity Hawk, who was seen by synchronous (real-time) audio-video technology, and/or her healthcare decision maker, is aware that this patient-initiated, Telehealth encounter on 4/30/2020 is a billable service, with coverage as determined by her insurance carrier. She is aware that she may receive a bill and has provided verbal consent to proceed: Yes. Assessment & Plan:   Diagnoses and all orders for this visit:    1. Hyperglycemia due to type 1 diabetes mellitus ([facility] [address]    2. ONEYDA (acute kidney injury) ([facility] [address]    3. Vascular dementia without behavioral disturbance ([facility] [address]    4. Hypothyroidism due to medication    1. The patient has poorly controlled diabetes primarily related to noncompliance. This is complicated by her dementia which makes adherence to a diabetic regimen impossible. She unfortunately does not have a support system at home to assist.  I have attempted to get her continuous glucose monitoring but I have been not but I have not been successful. For now she will continue Tresiba 18 units daily along with 4 units of her insulin analog AC. Also remind her and daughter the importance of eating at the same time every day. 2.  Her dementia is gradually getting worse. Does not much to offer her regarding this. 3.  She will continue her statin in view of her increased cardiovascular risk. 4.  She also has a history of hypothyroidism and her last TSH was excellent. I      I spent at least 23 minutes on this visit with this established patient. ([id number])    Subjective:   Felicity Hawk is a 67 y.o. female who was seen for Transitions Of Care  The patient is seen having been most recently hospitalized for DKA. This is a recurrent problem because of her complete noncompliance with her diabetic regimen.   This is not her fault because she has dementia which is gradually getting worse.  As a result she is incapable of doing the things that are required to maintain reasonable diabetic control. Her support network is also not very helpful at this point either. She is on a basal bolus preparation of insulin. I have attempted to get continuous glucose monitoring but have been unsuccessful after multiple attempts. Is a past history of primary hypertension dyslipidemia and hypothyroidism. I suspect she has vascular dementia and unfortunately not much can be done as relates to this disease entity. Most of my communication is with her daughter. Patient however answers questions quite appropriately but the biggest problem is her short-term memory. Prior to Admission medications    Medication Sig Start Date End Date Taking? Authorizing Provider   insulin degludec [facility] FlexTouch U-100) 100 unit/mL (3 mL) inpn 18 Units by SubCUTAneous route daily. 4/25/20  Yes Clinton Maxwell MD   pravastatin (PRAVACHOL) 80 mg tablet TAKE 1 TABLET BY MOUTH at bedtime 2/27/20  Yes Clinton Maxwell MD   metoprolol succinate (TOPROL-XL) 25 mg XL tablet Take 1 Tab by mouth daily. 2/27/20  Yes Clinton Maxwell MD   levothyroxine (SYNTHROID) 175 mcg tablet TAKE 1 TABLET BY MOUTH EVERY DAY 2/27/20  Yes Clinton Maxwell MD   clopidogreL (PLAVIX) 75 mg tab TAKE 1 TABLET BY MOUTH EVERY DAY 2/27/20  Yes Clinton Maxwell MD   amLODIPine (NORVASC) 10 mg tablet TAKE 1 TABLET BY MOUTH EVERY DAY IN THE MORNING 2/27/20  Yes Clinton Maxwell MD   flash glucose scanning reader (FREESTYLE KEVON 14 DAY READER) [facility] As directed1 2/27/20  Yes Clinton Maxwell MD   flash glucose sensor (FREESTYLE KEVON 14 DAY SENSOR) kit Continuous monitoring 2/27/20  Yes Clinton Maxwell MD   glucose blood VI test strips (ONETOUCH ULTRA BLUE TEST STRIP) strip USE TO CHECK BLOOD SUGARS DAILY. Dx: E11.649 2/25/20  Yes Clinton Maxwell MD   Blood-Glucose Meter (ONETOUCH ULTRA2 METER) misc USE TO CHECK BLOOD SUGARS DAILY.  2/25/20  Yes Gianna Zamora MD   insulin aspart U-100 (NOVOLOG) 100 unit/mL (3 mL) inpn 4 units AC breakfast,lunch, and dinner  Patient taking differently: 4 Units by SubCUTAneous route Before breakfast, lunch, and dinner. 4 units AC breakfast,lunch, and dinner 6/15/19  Yes Gianna Zamora MD   brimonidine (ALPHAGAN) 0.15 % ophthalmic solution Administer 1 Drop to both eyes two (2) times a day. 1/30/15  Yes Provider, Historical     No Known Allergies    Current Outpatient Medications   Medication Sig Dispense Refill    insulin degludec Tami Hilts FlexTouch U-100) 100 unit/mL (3 mL) inpn 18 Units by SubCUTAneous route daily. 2 Adjustable Dose Pre-filled Pen Syringe 11    pravastatin (PRAVACHOL) 80 mg tablet TAKE 1 TABLET BY MOUTH at bedtime 90 Tab 3    metoprolol succinate (TOPROL-XL) 25 mg XL tablet Take 1 Tab by mouth daily. 90 Tab 3    levothyroxine (SYNTHROID) 175 mcg tablet TAKE 1 TABLET BY MOUTH EVERY DAY 90 Tab 3    clopidogreL (PLAVIX) 75 mg tab TAKE 1 TABLET BY MOUTH EVERY DAY 90 Tab 3    amLODIPine (NORVASC) 10 mg tablet TAKE 1 TABLET BY MOUTH EVERY DAY IN THE MORNING 90 Tab 3    flash glucose scanning reader (FREESTYLE KEVON 14 DAY READER) [facility] As directed1 1 Each 5    flash glucose sensor (FREESTYLE KEVON 14 DAY SENSOR) kit Continuous monitoring 2 Kit 11    glucose blood VI test strips (ONETOUCH ULTRA BLUE TEST STRIP) strip USE TO CHECK BLOOD SUGARS DAILY. Dx: E11.649 100 Strip 11    Blood-Glucose Meter (ONETOUCH ULTRA2 METER) misc USE TO CHECK BLOOD SUGARS DAILY. 1 Each 0    insulin aspart U-100 (NOVOLOG) 100 unit/mL (3 mL) inpn 4 units AC breakfast,lunch, and dinner (Patient taking differently: 4 Units by SubCUTAneous route Before breakfast, lunch, and dinner. 4 units AC breakfast,lunch, and dinner) 1 Adjustable Dose Pre-filled Pen Syringe 11    brimonidine (ALPHAGAN) 0.15 % ophthalmic solution Administer 1 Drop to both eyes two (2) times a day.        No Known Allergies  Past Medical History: Diagnosis Date    Heart failure ([facility] [address]     unknown to family    Hypertension     Stroke [facility])      Past Surgical History:   Procedure Laterality Date    HX GYN      HX HEENT      thyroidectomy    HX HYSTERECTOMY      REMOVAL GALLBLADDER      THYROIDECTOMY       Family History   Problem Relation Age of Onset    Diabetes Father     No Known Problems Mother        ROS:unattainable    Objective:   Vital Signs: (As obtained by patient/caregiver at home)  There were no vitals taken for this visit.      [INSTRUCTIONS:  \"[x]\" Indicates a positive item  \"[]\" Indicates a negative item  -- DELETE ALL ITEMS NOT EXAMINED]    Constitutional: [x] Appears well-developed and well-nourished [x] No apparent distress      [] Abnormal -     Mental status: [x] Alert and awake  [x] Oriented to person/place/time [x] Able to follow commands    [] Abnormal -     Eyes:   EOM    [x]  Normal    [] Abnormal -   Sclera  [x]  Normal    [] Abnormal -          Discharge []  None visible   [] Abnormal -     HENT: [x] Normocephalic, atraumatic  [] Abnormal -   [] Mouth/Throat: Mucous membranes are moist    External Ears [x] Normal  [] Abnormal -    Neck: [x] No visualized mass [] Abnormal -     Pulmonary/Chest: [x] Respiratory effort normal   [x] No visualized signs of difficulty breathing or respiratory distress        [] Abnormal -      Musculoskeletal:   [x] Normal gait with no signs of ataxia         [x] Normal range of motion of neck        [] Abnormal -     Neurological:        [x] No Facial Asymmetry (Cranial nerve 7 motor function) (limited exam due to video visit)          [x] No gaze palsy        [] Abnormal -          Skin:        [x] No significant exanthematous lesions or discoloration noted on facial skin         [] Abnormal -            Psychiatric:       [x] Normal Affect [] Abnormal -        [x] No Hallucinations    Other pertinent observable physical exam findings:-        We discussed the expected course, resolution and complications of the diagnosis(es) in detail. Medication risks, benefits, costs, interactions, and alternatives were discussed as indicated. I advised her to contact the office if her condition worsens, changes or fails to improve as anticipated. She expressed understanding with the diagnosis(es) and plan. Chadd Cordero is a 67 y.o. female who was evaluated by a video visit encounter for concerns as above. Patient identification was verified prior to start of the visit. A caregiver was present when appropriate. Due to this being a TeleHealth encounter (During JWEMH-93 public health emergency), evaluation of the following organ systems was limited: Vitals/Constitutional/EENT/Resp/CV/GI//MS/Neuro/Skin/Heme-Lymph-Imm. Pursuant to the emergency declaration under the [address] [place] [address], [facility] waiver authority and the [employer] Act, this Virtual  Visit was conducted, with patient's (and/or legal guardian's) consent, to reduce the patient's risk of exposure to COVID-19 and provide necessary medical care. Services were provided through a video synchronous discussion virtually to substitute for in-person clinic visit. Patient and provider were located at their individual homes. Please note that this dictation was completed with [employer], the computer voice recognition software. Quite often unanticipated grammatical, syntax, homophones, and other interpretive errors are inadvertently transcribed by the computer software. Please disregard these errors. Please excuse any errors that have escaped final proofreading. Thank you.     Bentley Bro MD

## 2020-05-02 PROCEDURE — 3331090002 HH PPS REVENUE DEBIT

## 2020-05-02 PROCEDURE — 3331090001 HH PPS REVENUE CREDIT

## 2020-05-03 PROCEDURE — 3331090001 HH PPS REVENUE CREDIT

## 2020-05-03 PROCEDURE — 3331090002 HH PPS REVENUE DEBIT

## 2020-05-04 ENCOUNTER — HOME CARE VISIT (OUTPATIENT)
Dept: HOME HEALTH SERVICES | Facility: HOME HEALTH | Age: 80
End: 2020-05-04
Payer: MEDICARE

## 2020-05-04 ENCOUNTER — TELEPHONE (OUTPATIENT)
Dept: INTERNAL MEDICINE CLINIC | Age: 80
End: 2020-05-04

## 2020-05-04 ENCOUNTER — HOME CARE VISIT (OUTPATIENT)
Dept: SCHEDULING | Facility: HOME HEALTH | Age: 80
End: 2020-05-04
Payer: MEDICARE

## 2020-05-04 VITALS
TEMPERATURE: 97.9 F | OXYGEN SATURATION: 96 % | HEART RATE: 78 BPM | SYSTOLIC BLOOD PRESSURE: 150 MMHG | DIASTOLIC BLOOD PRESSURE: 78 MMHG

## 2020-05-04 PROCEDURE — 3331090002 HH PPS REVENUE DEBIT

## 2020-05-04 PROCEDURE — G0158 HHC OT ASSISTANT EA 15: HCPCS

## 2020-05-04 PROCEDURE — 3331090001 HH PPS REVENUE CREDIT

## 2020-05-04 NOTE — TELEPHONE ENCOUNTER
New order for per Dr. Andrade Button increase Epimenio Dire to 20 units. Patient contacted and advised , understanding verbalized.

## 2020-05-04 NOTE — TELEPHONE ENCOUNTER
Saurav Mustafa with [facility] reported patient blood sugar was 428 before any medication. Saurav Mustafa is seeking recommendations on what she the patient do & clarification on her current insulin regimen. Best contact is [phone] Please advise!

## 2020-05-05 ENCOUNTER — PATIENT OUTREACH (OUTPATIENT)
Dept: FAMILY MEDICINE CLINIC | Age: 80
End: 2020-05-05

## 2020-05-05 ENCOUNTER — HOSPITAL ENCOUNTER (EMERGENCY)
Age: 80
Discharge: HOME | End: 2020-05-05
Attending: EMERGENCY MEDICINE
Payer: MEDICARE

## 2020-05-05 ENCOUNTER — HOME CARE VISIT (OUTPATIENT)
Dept: SCHEDULING | Facility: HOME HEALTH | Age: 80
End: 2020-05-05
Payer: MEDICARE

## 2020-05-05 VITALS
HEART RATE: 84 BPM | OXYGEN SATURATION: 98 % | RESPIRATION RATE: 16 BRPM | TEMPERATURE: 98.3 F | SYSTOLIC BLOOD PRESSURE: 128 MMHG | DIASTOLIC BLOOD PRESSURE: 86 MMHG

## 2020-05-05 VITALS
WEIGHT: 160 LBS | HEIGHT: 62 IN | HEART RATE: 88 BPM | BODY MASS INDEX: 29.44 KG/M2 | SYSTOLIC BLOOD PRESSURE: 150 MMHG | OXYGEN SATURATION: 100 % | DIASTOLIC BLOOD PRESSURE: 78 MMHG | TEMPERATURE: 97.3 F | RESPIRATION RATE: 21 BRPM

## 2020-05-05 DIAGNOSIS — R41.89 EPISODE OF UNRESPONSIVENESS: ICD-10-CM

## 2020-05-05 DIAGNOSIS — E16.2 HYPOGLYCEMIA: Primary | ICD-10-CM

## 2020-05-05 LAB
ALBUMIN SERPL-MCNC: 3 G/DL (ref 3.5–5)
ALBUMIN/GLOB SERPL: 0.8 {RATIO} (ref 1.1–2.2)
ALP SERPL-CCNC: 151 U/L (ref 45–117)
ALT SERPL-CCNC: 44 U/L (ref 12–78)
ANION GAP SERPL CALC-SCNC: 10 MMOL/L (ref 5–15)
APPEARANCE UR: CLEAR
AST SERPL-CCNC: 58 U/L (ref 15–37)
BACTERIA URNS QL MICRO: NEGATIVE /HPF
BASOPHILS # BLD: 0 K/UL (ref 0–0.1)
BASOPHILS NFR BLD: 0 % (ref 0–1)
BILIRUB SERPL-MCNC: 0.2 MG/DL (ref 0.2–1)
BILIRUB UR QL: NEGATIVE
BUN SERPL-MCNC: 14 MG/DL (ref 6–20)
BUN/CREAT SERPL: 24 (ref 12–20)
CALCIUM SERPL-MCNC: 8.7 MG/DL (ref 8.5–10.1)
CHLORIDE SERPL-SCNC: 104 MMOL/L (ref 97–108)
CO2 SERPL-SCNC: 22 MMOL/L (ref 21–32)
COLOR UR: ABNORMAL
CREAT SERPL-MCNC: 0.59 MG/DL (ref 0.55–1.02)
DIFFERENTIAL METHOD BLD: ABNORMAL
EOSINOPHIL # BLD: 0.1 K/UL (ref 0–0.4)
EOSINOPHIL NFR BLD: 1 % (ref 0–7)
EPITH CASTS URNS QL MICRO: ABNORMAL /LPF
ERYTHROCYTE [DISTWIDTH] IN BLOOD BY AUTOMATED COUNT: 15.9 % (ref 11.5–14.5)
GLOBULIN SER CALC-MCNC: 3.6 G/DL (ref 2–4)
GLUCOSE BLD STRIP.AUTO-MCNC: 202 MG/DL (ref 65–100)
GLUCOSE BLD STRIP.AUTO-MCNC: 219 MG/DL (ref 65–100)
GLUCOSE BLD STRIP.AUTO-MCNC: 265 MG/DL (ref 65–100)
GLUCOSE BLD STRIP.AUTO-MCNC: 64 MG/DL (ref 65–100)
GLUCOSE SERPL-MCNC: 209 MG/DL (ref 65–100)
GLUCOSE UR STRIP.AUTO-MCNC: >1000 MG/DL
HCT VFR BLD AUTO: 35.2 % (ref 35–47)
HGB BLD-MCNC: 11.9 G/DL (ref 11.5–16)
HGB UR QL STRIP: NEGATIVE
HYALINE CASTS URNS QL MICRO: ABNORMAL /LPF (ref 0–5)
IMM GRANULOCYTES # BLD AUTO: 0.1 K/UL (ref 0–0.04)
IMM GRANULOCYTES NFR BLD AUTO: 1 % (ref 0–0.5)
KETONES UR QL STRIP.AUTO: NEGATIVE MG/DL
LEUKOCYTE ESTERASE UR QL STRIP.AUTO: ABNORMAL
LYMPHOCYTES # BLD: 0.7 K/UL (ref 0.8–3.5)
LYMPHOCYTES NFR BLD: 14 % (ref 12–49)
MCH RBC QN AUTO: 31.9 PG (ref 26–34)
MCHC RBC AUTO-ENTMCNC: 33.8 G/DL (ref 30–36.5)
MCV RBC AUTO: 94.4 FL (ref 80–99)
MONOCYTES # BLD: 0.5 K/UL (ref 0–1)
MONOCYTES NFR BLD: 9 % (ref 5–13)
NEUTS SEG # BLD: 3.9 K/UL (ref 1.8–8)
NEUTS SEG NFR BLD: 75 % (ref 32–75)
NITRITE UR QL STRIP.AUTO: NEGATIVE
NRBC # BLD: 0 K/UL (ref 0–0.01)
NRBC BLD-RTO: 0 PER 100 WBC
PH UR STRIP: 6 [PH] (ref 5–8)
PLATELET # BLD AUTO: 267 K/UL (ref 150–400)
PMV BLD AUTO: 10 FL (ref 8.9–12.9)
POTASSIUM SERPL-SCNC: 3.5 MMOL/L (ref 3.5–5.1)
PROT SERPL-MCNC: 6.6 G/DL (ref 6.4–8.2)
PROT UR STRIP-MCNC: NEGATIVE MG/DL
RBC # BLD AUTO: 3.73 M/UL (ref 3.8–5.2)
RBC #/AREA URNS HPF: ABNORMAL /HPF (ref 0–5)
RBC MORPH BLD: ABNORMAL
SERVICE CMNT-IMP: ABNORMAL
SODIUM SERPL-SCNC: 136 MMOL/L (ref 136–145)
SP GR UR REFRACTOMETRY: 1.02 (ref 1–1.03)
UA: UC IF INDICATED,UAUC: ABNORMAL
UROBILINOGEN UR QL STRIP.AUTO: 0.2 EU/DL (ref 0.2–1)
WBC # BLD AUTO: 5.3 K/UL (ref 3.6–11)
WBC URNS QL MICRO: ABNORMAL /HPF (ref 0–4)

## 2020-05-05 PROCEDURE — 3331090001 HH PPS REVENUE CREDIT

## 2020-05-05 PROCEDURE — 74011000250 HC RX REV CODE- 250: Performed by: EMERGENCY MEDICINE

## 2020-05-05 PROCEDURE — G0299 HHS/HOSPICE OF RN EA 15 MIN: HCPCS

## 2020-05-05 PROCEDURE — 80053 COMPREHEN METABOLIC PANEL: CPT

## 2020-05-05 PROCEDURE — 3331090002 HH PPS REVENUE DEBIT

## 2020-05-05 PROCEDURE — 36415 COLL VENOUS BLD VENIPUNCTURE: CPT

## 2020-05-05 PROCEDURE — 99285 EMERGENCY DEPT VISIT HI MDM: CPT

## 2020-05-05 PROCEDURE — 96374 THER/PROPH/DIAG INJ IV PUSH: CPT

## 2020-05-05 PROCEDURE — 87086 URINE CULTURE/COLONY COUNT: CPT

## 2020-05-05 PROCEDURE — 81001 URINALYSIS AUTO W/SCOPE: CPT

## 2020-05-05 PROCEDURE — 82962 GLUCOSE BLOOD TEST: CPT

## 2020-05-05 PROCEDURE — 85025 COMPLETE CBC W/AUTO DIFF WBC: CPT

## 2020-05-05 RX ORDER — SODIUM CHLORIDE 0.9 % (FLUSH) 0.9 %
5-40 SYRINGE (ML) INJECTION AS NEEDED
Status: DISCONTINUED | OUTPATIENT
Start: 2020-05-05 | End: 2020-05-05 | Stop reason: HOSPADM

## 2020-05-05 RX ORDER — SODIUM CHLORIDE 0.9 % (FLUSH) 0.9 %
5-40 SYRINGE (ML) INJECTION EVERY 8 HOURS
Status: DISCONTINUED | OUTPATIENT
Start: 2020-05-05 | End: 2020-05-05 | Stop reason: HOSPADM

## 2020-05-05 RX ORDER — DEXTROSE 50 % IN WATER (D50W) INTRAVENOUS SYRINGE
50
Status: COMPLETED | OUTPATIENT
Start: 2020-05-05 | End: 2020-05-05

## 2020-05-05 RX ADMIN — DEXTROSE MONOHYDRATE 25 G: 25 INJECTION, SOLUTION INTRAVENOUS at 02:00

## 2020-05-05 NOTE — DISCHARGE INSTRUCTIONS
Patient Education        Hypoglycemia: Care Instructions  Your Care Instructions    Hypoglycemia means that your blood sugar is low and your body is not getting enough fuel. Some people get low blood sugar from not eating often enough. Some medicines to treat diabetes can cause low blood sugar. People who have had surgery on their stomachs or intestines may get hypoglycemia. Problems with the pancreas, kidneys, or liver also can cause low blood sugar. A snack or drink with sugar in it will raise your blood sugar and should ease your symptoms right away. Your doctor may recommend that you change or stop your medicines until you can get your blood sugar levels under control. In the long run, you may need to change your diet and eating habits so that you get enough fuel for your body throughout the day. Follow-up care is a key part of your treatment and safety. Be sure to make and go to all appointments, and call your doctor if you are having problems. It's also a good idea to know your test results and keep a list of the medicines you take. How can you care for yourself at home? · Learn to recognize the early signs of low blood sugar. Signs include:  ? Nausea. ? Hunger. ? Feeling nervous, irritable, or shaky. ? Cold, clammy, wet skin. ? Sweating (when you are not exercising). ? A fast heartbeat.  ? Numbness or tingling of the fingertips or lips. · If you feel an episode of low blood sugar coming on, eat or drink a quick-sugar food. Some examples of quick-sugar foods are glucose tablets, table sugar, hard candy (such as Life Savers), fruit juice, and regular (not diet) soda. · Eat small, frequent meals so that you do not get too hungry between meals. · Balance extra exercise with eating more. · Keep a written record of your low blood sugar episodes, including when you last ate and what you ate, so that you can learn what causes your blood sugar to drop.   · Make sure your family, friends, and coworkers know the symptoms of low blood sugar and know what to do to get your sugar level up. · Wear medical alert jewelry that lists your condition. You can buy this at most drugstores. When should you call for help? Call 911 anytime you think you may need emergency care. For example, call if:    · You passed out (lost consciousness).     · You are confused or cannot think clearly.     · Your blood sugar is very high or very low.    Watch closely for changes in your health, and be sure to contact your doctor if:    · Your blood sugar stays outside the level your doctor set for you.     · You have any problems. Where can you learn more? Go to http://toi-sunli.info/  Enter R955 in the search box to learn more about \"Hypoglycemia: Care Instructions. \"  Current as of: December 19, 2019Content Version: 12.4  © 5082-3132 [employer]. Care instructions adapted under license by [employer] (which disclaims liability or warranty for this information). If you have questions about a medical condition or this instruction, always ask your healthcare professional. [address] any warranty or liability for your use of this information.

## 2020-05-05 NOTE — ED NOTES
HYPOGLYCEMIC EPISODE DOCUMENTATION    Patient with hypoglycemic episode(s) at 0220(time) on 05/05/20(date). BG value(s) pre-treatment 59    Was patient symptomatic?  [x] yes, [] no  Patient was treated with the following rescue medications/treatments: [x] D50                [] Glucose tablets                [] Glucagon                [] 4oz juice                [] 6oz reg soda                [] 8oz low fat milk  BG value post-treatment: 219  Once BG treated and value greater than 80mg/dl, pt was provided with the following:  [x] snack  [x] meal  Name of MD notified:Daniella  The following orders were received: N/A

## 2020-05-05 NOTE — ED PROVIDER NOTES
EMERGENCY DEPARTMENT HISTORY AND PHYSICAL EXAM      Date: 5/5/2020  Patient Name: Chastity Handley    History of Presenting Illness     Chief Complaint   Patient presents with    Low Blood Sugar     pt arrives via EMS from home after being found unresponsive  by son with a BG of 38 on EMS arrival. EMS gave 2 glucagon, post BG 48. pt responding to voice and opening eyes on arrival to ED       History Provided By: EMS    HPI: Chastity Handley, 67 y.o. female with PMHx significant for hypertension and type 1 diabetes with recent admission for DKA presents to the ED with an unresponsive episode. Son called EMS when he found patient unresponsive at home. Blood sugar for EMS was 38. They were unable to establish an IV but gave glucagon IM and repeat blood sugar was 48. Patient was unresponsive when EMS arrived at her residence, but by the time they arrived in the ED, she was opening her eyes to voice. IV established immediately on arrival and D50 administered. Patient was then able to answer questions. She reports that she is not had any nausea, vomiting, diarrhea, dysuria, hematuria, urinary frequency, fever, chills, cough, chest pain. She reports she has been taking her medicines as prescribed but cannot remember if she ate dinner tonight. PCP: Reyna Alas MD    No current facility-administered medications on file prior to encounter. Current Outpatient Medications on File Prior to Encounter   Medication Sig Dispense Refill    insulin degludec Vertis Began FlexTouch U-100) 100 unit/mL (3 mL) inpn 18 Units by SubCUTAneous route daily. 2 Adjustable Dose Pre-filled Pen Syringe 11    pravastatin (PRAVACHOL) 80 mg tablet TAKE 1 TABLET BY MOUTH at bedtime 90 Tab 3    metoprolol succinate (TOPROL-XL) 25 mg XL tablet Take 1 Tab by mouth daily.  90 Tab 3    levothyroxine (SYNTHROID) 175 mcg tablet TAKE 1 TABLET BY MOUTH EVERY DAY 90 Tab 3    clopidogreL (PLAVIX) 75 mg tab TAKE 1 TABLET BY MOUTH EVERY DAY 90 Tab 3  amLODIPine (NORVASC) 10 mg tablet TAKE 1 TABLET BY MOUTH EVERY DAY IN THE MORNING 90 Tab 3    flash glucose scanning reader (FREESTYLE KEVON 14 DAY READER) [facility] [place], [place] As directed1 1 Each 5    flash glucose sensor (FREESTYLE KEVON 14 DAY SENSOR) kit Continuous monitoring 2 Kit 11    glucose blood VI test strips (ONETOUCH ULTRA BLUE TEST STRIP) strip USE TO CHECK BLOOD SUGARS DAILY. Dx: E11.649 100 Strip 11    Blood-Glucose Meter (ONETOUCH ULTRA2 METER) misc USE TO CHECK BLOOD SUGARS DAILY. 1 Each 0    insulin aspart U-100 (NOVOLOG) 100 unit/mL (3 mL) inpn 4 units AC breakfast,lunch, and dinner (Patient taking differently: 4 Units by SubCUTAneous route Before breakfast, lunch, and dinner. 4 units AC breakfast,lunch, and dinner) 1 Adjustable Dose Pre-filled Pen Syringe 11    brimonidine (ALPHAGAN) 0.15 % ophthalmic solution Administer 1 Drop to both eyes two (2) times a day. Past History     Past Medical History:  Past Medical History:   Diagnosis Date    Heart failure ([facility] [address]     unknown to family    Hypertension     Stroke [facility])        Past Surgical History:  Past Surgical History:   Procedure Laterality Date    HX GYN      HX HEENT      thyroidectomy    HX HYSTERECTOMY      REMOVAL GALLBLADDER      THYROIDECTOMY         Family History:  Family History   Problem Relation Age of Onset    Diabetes Father     No Known Problems Mother        Social History:  Social History     Tobacco Use    Smoking status: Never Smoker    Smokeless tobacco: Never Used   Substance Use Topics    Alcohol use: No     Comment: been years    Drug use: No       Allergies:  No Known Allergies      Review of Systems   Review of Systems   Constitutional: Negative for chills and fever. HENT: Negative for congestion, ear pain, sinus pressure and sore throat. Eyes: Negative. Respiratory: Negative for cough, chest tightness, shortness of breath and wheezing. Cardiovascular: Negative for chest pain and palpitations. Gastrointestinal: Negative for abdominal pain, constipation, diarrhea, nausea and vomiting. Genitourinary: Negative for dysuria, flank pain, hematuria and pelvic pain. Musculoskeletal: Negative for back pain and myalgias. Skin: Negative for rash and wound. Neurological: Negative for syncope, light-headedness and headaches. Psychiatric/Behavioral: Positive for confusion. The patient is not nervous/anxious. All other systems reviewed and are negative.         Physical Exam    General appearance -overweight, well appearing, and in no distress  Eyes - pupils equal and reactive, extraocular eye movements intact  ENT - mucous membranes moist, pharynx normal without lesions  Neck - supple, no significant adenopathy; non-tender to palpation  Chest - clear to auscultation, no wheezes, rales or rhonchi; non-tender to palpation  Heart - normal rate and regular rhythm, S1 and S2 normal, no murmurs noted  Abdomen - soft, nontender, nondistended, no masses or organomegaly  Musculoskeletal - no joint tenderness, deformity or swelling; normal ROM  Extremities - peripheral pulses normal, no pedal edema  Skin - normal coloration and turgor, no rashes  Neurological - alert, oriented x2, normal speech, no focal findings or movement disorder noted    Diagnostic Study Results     Labs -     Recent Results (from the past 12 hour(s))   GLUCOSE, POC    Collection Time: 05/05/20  2:21 AM   Result Value Ref Range    Glucose (POC) 64 (L) 65 - 100 mg/dL    Performed by Toñito Miller    GLUCOSE, POC    Collection Time: 05/05/20  2:35 AM   Result Value Ref Range    Glucose (POC) 219 (H) 65 - 100 mg/dL    Performed by Miguel Montano ([facility]) BSN    CBC WITH AUTOMATED DIFF    Collection Time: 05/05/20  2:44 AM   Result Value Ref Range    WBC 5.3 3.6 - 11.0 K/uL    RBC 3.73 (L) 3.80 - 5.20 M/uL    HGB 11.9 11.5 - 16.0 g/dL    HCT 35.2 35.0 - 47.0 %    MCV 94.4 80.0 - 99.0 FL    MCH 31.9 26.0 - 34.0 PG    MCHC 33.8 30.0 - 36.5 g/dL RDW 15.9 (H) 11.5 - 14.5 %    PLATELET 181 342 - 064 K/uL    MPV 10.0 8.9 - 12.9 FL    NRBC 0.0 0  WBC    ABSOLUTE NRBC 0.00 0.00 - 0.01 K/uL    NEUTROPHILS 75 32 - 75 %    LYMPHOCYTES 14 12 - 49 %    MONOCYTES 9 5 - 13 %    EOSINOPHILS 1 0 - 7 %    BASOPHILS 0 0 - 1 %    IMMATURE GRANULOCYTES 1 (H) 0.0 - 0.5 %    ABS. NEUTROPHILS 3.9 1.8 - 8.0 K/UL    ABS. LYMPHOCYTES 0.7 (L) 0.8 - 3.5 K/UL    ABS. MONOCYTES 0.5 0.0 - 1.0 K/UL    ABS. EOSINOPHILS 0.1 0.0 - 0.4 K/UL    ABS. BASOPHILS 0.0 0.0 - 0.1 K/UL    ABS. IMM. GRANS. 0.1 (H) 0.00 - 0.04 K/UL    DF AUTOMATED      RBC COMMENTS NORMOCYTIC, NORMOCHROMIC     METABOLIC PANEL, COMPREHENSIVE    Collection Time: 05/05/20  2:44 AM   Result Value Ref Range    Sodium 136 136 - 145 mmol/L    Potassium 3.5 3.5 - 5.1 mmol/L    Chloride 104 97 - 108 mmol/L    CO2 22 21 - 32 mmol/L    Anion gap 10 5 - 15 mmol/L    Glucose 209 (H) 65 - 100 mg/dL    BUN 14 6 - 20 MG/DL    Creatinine 0.59 0.55 - 1.02 MG/DL    BUN/Creatinine ratio 24 (H) 12 - 20      GFR est AA >60 >60 ml/min/1.73m2    GFR est non-AA >60 >60 ml/min/1.73m2    Calcium 8.7 8.5 - 10.1 MG/DL    Bilirubin, total 0.2 0.2 - 1.0 MG/DL    ALT (SGPT) 44 12 - 78 U/L    AST (SGOT) 58 (H) 15 - 37 U/L    Alk.  phosphatase 151 (H) 45 - 117 U/L    Protein, total 6.6 6.4 - 8.2 g/dL    Albumin 3.0 (L) 3.5 - 5.0 g/dL    Globulin 3.6 2.0 - 4.0 g/dL    A-G Ratio 0.8 (L) 1.1 - 2.2     URINALYSIS W/ REFLEX CULTURE    Collection Time: 05/05/20  2:44 AM   Result Value Ref Range    Color YELLOW/STRAW      Appearance CLEAR CLEAR      Specific gravity 1.016 1.003 - 1.030      pH (UA) 6.0 5.0 - 8.0      Protein Negative NEG mg/dL    Glucose >1,000 (A) NEG mg/dL    Ketone Negative NEG mg/dL    Bilirubin Negative NEG      Blood Negative NEG      Urobilinogen 0.2 0.2 - 1.0 EU/dL    Nitrites Negative NEG      Leukocyte Esterase MODERATE (A) NEG      WBC 10-20 0 - 4 /hpf    RBC 0-5 0 - 5 /hpf    Epithelial cells FEW FEW /lpf    Bacteria Negative NEG /hpf    UA:UC IF INDICATED URINE CULTURE ORDERED (A) CNI      Hyaline cast 0-2 0 - 5 /lpf   GLUCOSE, POC    Collection Time: 05/05/20  4:28 AM   Result Value Ref Range    Glucose (POC) 265 (H) 65 - 100 mg/dL    Performed by Gus Sam (EDT)    GLUCOSE, POC    Collection Time: 05/05/20  5:50 AM   Result Value Ref Range    Glucose (POC) 202 (H) 65 - 100 mg/dL    Performed by [facility]        Radiologic Studies -   No orders to display     CT Results  (Last 48 hours)    None        CXR Results  (Last 48 hours)    None            Medical Decision Making   I am the first provider for this patient. I reviewed the vital signs, available nursing notes, past medical history, past surgical history, family history and social history. Vital Signs-Reviewed the patient's vital signs. Patient Vitals for the past 12 hrs:   Temp Pulse Resp BP SpO2   05/05/20 0430  88 21 150/78 100 %   05/05/20 0245  89 16 162/68 100 %   05/05/20 0229 97.3 °F (36.3 °C) 100 19 (!) 153/97 100 %         Records Reviewed: Nursing Notes and Old Medical Records    Provider Notes (Medical Decision Making):   Differential diagnosis: Hyperglycemia, hypoglycemia, UTI, electrolyte abnormality  We will check CBC, CMP, UA and will monitor blood sugars. Will provide patient with crackers and peanut butter. ED Course:   Initial assessment performed. The patients presenting problems have been discussed, and they are in agreement with the care plan formulated and outlined with them. I have encouraged them to ask questions as they arise throughout their visit. Progress Notes:     Blood sugar has been stable for several hours. Patient is back to baseline mental status. She is ready for discharge. Will encourage her to follow-up with Dr. Jason Wellington today for adjustment of her diabetes medications        Disposition:  ND home    PLAN:  1. Current Discharge Medication List        2.    Follow-up Information     Follow up With Specialties Details Why Contact Info    \[facility]\"" EMERGENCY DEPT Emergency Medicine  If symptoms worsen [address] [place] [address]    Tara Chahal MD Internal Medicine Call today  [facility] [id number] [phone]          Return to ED if worse     Diagnosis     Clinical Impression:   1.  Hypoglycemia    2. Episode of unresponsiveness

## 2020-05-05 NOTE — ED TRIAGE NOTES
Pt arrives via EMS from home, per EMS was found at home unresponsive by son. BG was 38 at home, per EMS BG went up to 48 after 2 glucagon. Pt arrived able to open eyes and respond to voice upon arrival.    0221: BG upon arrival 59. Verbal order MD Isael for D50.  0222: IV 20g access right hand   0223: D50 given    Pt able to verbalize conversation to MD after arrival.  A/o x3    Pt denies chest pain n/v/d fevers sob    Bed in lowest position. Bed rails up. x3 on the monitor.

## 2020-05-05 NOTE — PROGRESS NOTES
Patient contacted regarding recent discharge and COVID-19 risk   Care Transition Nurse/ Ambulatory Care Manager contacted the patient by telephone to perform post discharge assessment. Verified name and  with patient as identifiers. Patient has following risk factors of: heart failure, diabetes and HTN, dementia. CTN/ACM reviewed discharge instructions, medical action plan and red flags related to discharge diagnosis. Reviewed and educated them on any new and changed medications related to discharge diagnosis. Advised obtaining a 90-day supply of all daily and as-needed medications. Education provided regarding infection prevention, and signs and symptoms of COVID-19 and when to seek medical attention with patient who verbalized understanding. Discussed exposure protocols and quarantine from [address] you at higher risk for severe illness  and given an opportunity for questions and concerns. The patient agrees to contact the COVID-19 hotline [phone] or PCP office for questions related to their healthcare. CTN/ACM provided contact information for future reference. From CDC: Are you at higher risk for severe illness?  Wash your hands often.  Avoid close contact (6 feet, which is about two arm lengths) with people who are sick.  Put distance between yourself and other people if COVID-19 is spreading in your community.  Clean and disinfect frequently touched surfaces.  Avoid all cruise travel and non-essential air travel.  Call your healthcare professional if you have concerns about COVID-19 and your underlying condition or if you are sick.     For more information on steps you can take to protect yourself, see CDC's How to Protect Yourself      Patient/family/caregiver given information for Shanta Santana and agrees to enroll no  Patient's preferred e-mail:  n/a  Patient's preferred phone number: n/a  Based on Loop alert triggers, patient will be contacted by nurse care manager for worsening symptoms. Plan for follow-up call in 7-14 days based on severity of symptoms and risk factors. Patient has dementia. Message left for daughter to verify information received. . Patient reports she had breakfast but has not checked BS. Declines to check with CTN on pone. She is denying chest pain, SOB, fever, N/V/D.

## 2020-05-05 NOTE — ED NOTES
Demetrius Lancaster MD reviewed discharge instructions with the patient. The patient verbalized understanding. All questions and concerns were addressed. The patient is discharged with instructions and prescriptions in hand. Pt is alert and oriented x 4. Respirations are clear and unlabored.

## 2020-05-06 ENCOUNTER — HOME CARE VISIT (OUTPATIENT)
Dept: HOME HEALTH SERVICES | Facility: HOME HEALTH | Age: 80
End: 2020-05-06
Payer: MEDICARE

## 2020-05-06 ENCOUNTER — HOME CARE VISIT (OUTPATIENT)
Dept: SCHEDULING | Facility: HOME HEALTH | Age: 80
End: 2020-05-06
Payer: MEDICARE

## 2020-05-06 VITALS
OXYGEN SATURATION: 98 % | HEART RATE: 85 BPM | TEMPERATURE: 98.3 F | SYSTOLIC BLOOD PRESSURE: 140 MMHG | RESPIRATION RATE: 18 BRPM | DIASTOLIC BLOOD PRESSURE: 80 MMHG

## 2020-05-06 VITALS
OXYGEN SATURATION: 98 % | SYSTOLIC BLOOD PRESSURE: 140 MMHG | TEMPERATURE: 98.4 F | DIASTOLIC BLOOD PRESSURE: 78 MMHG | HEART RATE: 83 BPM

## 2020-05-06 LAB
BACTERIA SPEC CULT: NORMAL
CC UR VC: NORMAL
SERVICE CMNT-IMP: NORMAL

## 2020-05-06 PROCEDURE — G0158 HHC OT ASSISTANT EA 15: HCPCS

## 2020-05-06 PROCEDURE — 3331090001 HH PPS REVENUE CREDIT

## 2020-05-06 PROCEDURE — 3331090002 HH PPS REVENUE DEBIT

## 2020-05-07 PROCEDURE — 3331090002 HH PPS REVENUE DEBIT

## 2020-05-07 PROCEDURE — 3331090001 HH PPS REVENUE CREDIT

## 2020-05-08 ENCOUNTER — HOME CARE VISIT (OUTPATIENT)
Dept: SCHEDULING | Facility: HOME HEALTH | Age: 80
End: 2020-05-08
Payer: MEDICARE

## 2020-05-08 PROCEDURE — 3331090001 HH PPS REVENUE CREDIT

## 2020-05-08 PROCEDURE — 3331090002 HH PPS REVENUE DEBIT

## 2020-05-09 PROCEDURE — 3331090001 HH PPS REVENUE CREDIT

## 2020-05-09 PROCEDURE — 3331090002 HH PPS REVENUE DEBIT

## 2020-05-10 PROCEDURE — 3331090001 HH PPS REVENUE CREDIT

## 2020-05-10 PROCEDURE — 3331090002 HH PPS REVENUE DEBIT

## 2020-05-11 DIAGNOSIS — I10 ESSENTIAL HYPERTENSION: ICD-10-CM

## 2020-05-11 PROCEDURE — 3331090001 HH PPS REVENUE CREDIT

## 2020-05-11 PROCEDURE — 3331090002 HH PPS REVENUE DEBIT

## 2020-05-11 RX ORDER — METOPROLOL SUCCINATE 25 MG/1
TABLET, EXTENDED RELEASE ORAL
Qty: 90 TAB | Refills: 3 | Status: SHIPPED | OUTPATIENT
Start: 2020-05-11 | End: 2021-11-08

## 2020-05-12 ENCOUNTER — HOME CARE VISIT (OUTPATIENT)
Dept: SCHEDULING | Facility: HOME HEALTH | Age: 80
End: 2020-05-12
Payer: MEDICARE

## 2020-05-12 ENCOUNTER — HOME CARE VISIT (OUTPATIENT)
Dept: HOME HEALTH SERVICES | Facility: HOME HEALTH | Age: 80
End: 2020-05-12
Payer: MEDICARE

## 2020-05-12 VITALS
HEART RATE: 78 BPM | TEMPERATURE: 98.2 F | SYSTOLIC BLOOD PRESSURE: 140 MMHG | OXYGEN SATURATION: 98 % | DIASTOLIC BLOOD PRESSURE: 68 MMHG

## 2020-05-12 PROCEDURE — 3331090002 HH PPS REVENUE DEBIT

## 2020-05-12 PROCEDURE — 3331090001 HH PPS REVENUE CREDIT

## 2020-05-12 PROCEDURE — G0158 HHC OT ASSISTANT EA 15: HCPCS

## 2020-05-12 PROCEDURE — G0300 HHS/HOSPICE OF LPN EA 15 MIN: HCPCS

## 2020-05-13 PROCEDURE — 3331090001 HH PPS REVENUE CREDIT

## 2020-05-13 PROCEDURE — 3331090002 HH PPS REVENUE DEBIT

## 2020-05-14 PROCEDURE — 3331090001 HH PPS REVENUE CREDIT

## 2020-05-14 PROCEDURE — 3331090002 HH PPS REVENUE DEBIT

## 2020-05-15 ENCOUNTER — HOME CARE VISIT (OUTPATIENT)
Dept: HOME HEALTH SERVICES | Facility: HOME HEALTH | Age: 80
End: 2020-05-15
Payer: MEDICARE

## 2020-05-15 ENCOUNTER — HOME CARE VISIT (OUTPATIENT)
Dept: SCHEDULING | Facility: HOME HEALTH | Age: 80
End: 2020-05-15
Payer: MEDICARE

## 2020-05-15 VITALS
OXYGEN SATURATION: 98 % | SYSTOLIC BLOOD PRESSURE: 128 MMHG | TEMPERATURE: 98.1 F | DIASTOLIC BLOOD PRESSURE: 67 MMHG | HEART RATE: 70 BPM

## 2020-05-15 PROCEDURE — 3331090002 HH PPS REVENUE DEBIT

## 2020-05-15 PROCEDURE — 3331090001 HH PPS REVENUE CREDIT

## 2020-05-15 PROCEDURE — G0152 HHCP-SERV OF OT,EA 15 MIN: HCPCS

## 2020-05-16 PROCEDURE — 3331090002 HH PPS REVENUE DEBIT

## 2020-05-16 PROCEDURE — 3331090001 HH PPS REVENUE CREDIT

## 2020-05-17 VITALS
OXYGEN SATURATION: 98 % | SYSTOLIC BLOOD PRESSURE: 130 MMHG | TEMPERATURE: 98.2 F | DIASTOLIC BLOOD PRESSURE: 60 MMHG | HEART RATE: 72 BPM | RESPIRATION RATE: 18 BRPM

## 2020-05-17 PROCEDURE — 3331090002 HH PPS REVENUE DEBIT

## 2020-05-17 PROCEDURE — 3331090001 HH PPS REVENUE CREDIT

## 2020-05-18 PROCEDURE — 3331090001 HH PPS REVENUE CREDIT

## 2020-05-18 PROCEDURE — 3331090002 HH PPS REVENUE DEBIT

## 2020-05-19 ENCOUNTER — HOME CARE VISIT (OUTPATIENT)
Dept: SCHEDULING | Facility: HOME HEALTH | Age: 80
End: 2020-05-19
Payer: MEDICARE

## 2020-05-19 ENCOUNTER — HOME CARE VISIT (OUTPATIENT)
Dept: HOME HEALTH SERVICES | Facility: HOME HEALTH | Age: 80
End: 2020-05-19
Payer: MEDICARE

## 2020-05-19 PROCEDURE — 3331090001 HH PPS REVENUE CREDIT

## 2020-05-19 PROCEDURE — 3331090002 HH PPS REVENUE DEBIT

## 2020-05-20 ENCOUNTER — PATIENT OUTREACH (OUTPATIENT)
Dept: FAMILY MEDICINE CLINIC | Age: 80
End: 2020-05-20

## 2020-05-20 PROCEDURE — 3331090002 HH PPS REVENUE DEBIT

## 2020-05-20 PROCEDURE — 3331090001 HH PPS REVENUE CREDIT

## 2020-05-20 NOTE — PROGRESS NOTES
Patient resolved from Transition of Care episode on 5/20/2020. ACM/CTN was unsuccessful at contacting this patient today. Patient/family was provided the following resources and education related to COVID-19 during the initial call:                         Signs, symptoms and red flags related to COVID-19            CDC exposure and quarantine guidelines            [facility] exposure contact - [phone]            Contact for their local Department of Health                 Patient has not had any additional ED or hospital visits. No further outreach scheduled with this CTN/ACM. Episode of Care resolved. Patient has this CTN/ACM contact information if future needs arise.

## 2020-05-21 PROCEDURE — 3331090001 HH PPS REVENUE CREDIT

## 2020-05-21 PROCEDURE — 3331090002 HH PPS REVENUE DEBIT

## 2020-05-22 ENCOUNTER — HOME CARE VISIT (OUTPATIENT)
Dept: SCHEDULING | Facility: HOME HEALTH | Age: 80
End: 2020-05-22
Payer: MEDICARE

## 2020-05-22 ENCOUNTER — HOME CARE VISIT (OUTPATIENT)
Dept: HOME HEALTH SERVICES | Facility: HOME HEALTH | Age: 80
End: 2020-05-22
Payer: MEDICARE

## 2020-05-22 PROCEDURE — 3331090002 HH PPS REVENUE DEBIT

## 2020-05-22 PROCEDURE — 3331090001 HH PPS REVENUE CREDIT

## 2020-05-22 PROCEDURE — G0299 HHS/HOSPICE OF RN EA 15 MIN: HCPCS

## 2020-05-23 ENCOUNTER — HOSPITAL ENCOUNTER (INPATIENT)
Age: 80
LOS: 5 days | Discharge: HOME HEALTH | DRG: 638 | End: 2020-05-28
Attending: EMERGENCY MEDICINE | Admitting: INTERNAL MEDICINE
Payer: MEDICARE

## 2020-05-23 DIAGNOSIS — E87.5 ACUTE HYPERKALEMIA: ICD-10-CM

## 2020-05-23 DIAGNOSIS — E10.10 TYPE 1 DIABETES MELLITUS WITH KETOACIDOSIS WITHOUT COMA (HCC): Primary | ICD-10-CM

## 2020-05-23 LAB
ALBUMIN SERPL-MCNC: 3.5 G/DL (ref 3.5–5)
ALBUMIN/GLOB SERPL: 1.2 {RATIO} (ref 1.1–2.2)
ALP SERPL-CCNC: 185 U/L (ref 45–117)
ALT SERPL-CCNC: 53 U/L (ref 12–78)
ANION GAP SERPL CALC-SCNC: 27 MMOL/L (ref 5–15)
ANION GAP SERPL CALC-SCNC: ABNORMAL MMOL/L (ref 5–15)
ANION GAP SERPL CALC-SCNC: ABNORMAL MMOL/L (ref 5–15)
APPEARANCE UR: ABNORMAL
ARTERIAL PATENCY WRIST A: ABNORMAL
AST SERPL-CCNC: 64 U/L (ref 15–37)
BACTERIA URNS QL MICRO: NEGATIVE /HPF
BASOPHILS # BLD: 0.1 K/UL (ref 0–0.1)
BASOPHILS NFR BLD: 1 % (ref 0–1)
BDY SITE: ABNORMAL
BILIRUB SERPL-MCNC: 0.5 MG/DL (ref 0.2–1)
BILIRUB UR QL: NEGATIVE
BUN SERPL-MCNC: 36 MG/DL (ref 6–20)
BUN SERPL-MCNC: 36 MG/DL (ref 6–20)
BUN SERPL-MCNC: 37 MG/DL (ref 6–20)
BUN/CREAT SERPL: 21 (ref 12–20)
CA-I BLD-SCNC: 1.14 MMOL/L (ref 1.12–1.32)
CALCIUM SERPL-MCNC: 8.8 MG/DL (ref 8.5–10.1)
CALCIUM SERPL-MCNC: 8.9 MG/DL (ref 8.5–10.1)
CALCIUM SERPL-MCNC: 9.4 MG/DL (ref 8.5–10.1)
CHLORIDE SERPL-SCNC: 104 MMOL/L (ref 97–108)
CHLORIDE SERPL-SCNC: 91 MMOL/L (ref 97–108)
CHLORIDE SERPL-SCNC: 98 MMOL/L (ref 97–108)
CO2 SERPL-SCNC: 7 MMOL/L (ref 21–32)
CO2 SERPL-SCNC: <5 MMOL/L (ref 21–32)
CO2 SERPL-SCNC: <5 MMOL/L (ref 21–32)
COLOR UR: ABNORMAL
CREAT SERPL-MCNC: 1.69 MG/DL (ref 0.55–1.02)
CREAT SERPL-MCNC: 1.75 MG/DL (ref 0.55–1.02)
CREAT SERPL-MCNC: 1.77 MG/DL (ref 0.55–1.02)
DIFFERENTIAL METHOD BLD: ABNORMAL
EOSINOPHIL # BLD: 0 K/UL (ref 0–0.4)
EOSINOPHIL NFR BLD: 0 % (ref 0–7)
EPITH CASTS URNS QL MICRO: ABNORMAL /LPF
ERYTHROCYTE [DISTWIDTH] IN BLOOD BY AUTOMATED COUNT: 14.9 % (ref 11.5–14.5)
EST. AVERAGE GLUCOSE BLD GHB EST-MCNC: 292 MG/DL
GAS FLOW.O2 O2 DELIVERY SYS: ABNORMAL L/MIN
GLOBULIN SER CALC-MCNC: 3 G/DL (ref 2–4)
GLUCOSE BLD STRIP.AUTO-MCNC: 426 MG/DL (ref 65–100)
GLUCOSE BLD STRIP.AUTO-MCNC: 496 MG/DL (ref 65–100)
GLUCOSE BLD STRIP.AUTO-MCNC: >600 MG/DL (ref 65–100)
GLUCOSE SERPL-MCNC: 1062 MG/DL (ref 65–100)
GLUCOSE SERPL-MCNC: 520 MG/DL (ref 65–100)
GLUCOSE SERPL-MCNC: 841 MG/DL (ref 65–100)
GLUCOSE UR STRIP.AUTO-MCNC: >1000 MG/DL
HBA1C MFR BLD: 11.8 % (ref 4–5.6)
HCT VFR BLD AUTO: 36.4 % (ref 35–47)
HGB BLD-MCNC: 11 G/DL (ref 11.5–16)
HGB UR QL STRIP: NEGATIVE
HYALINE CASTS URNS QL MICRO: ABNORMAL /LPF (ref 0–5)
IMM GRANULOCYTES # BLD AUTO: 0 K/UL (ref 0–0.04)
IMM GRANULOCYTES NFR BLD AUTO: 0 % (ref 0–0.5)
KETONES UR QL STRIP.AUTO: 15 MG/DL
LEUKOCYTE ESTERASE UR QL STRIP.AUTO: NEGATIVE
LYMPHOCYTES # BLD: 1.2 K/UL (ref 0.8–3.5)
LYMPHOCYTES NFR BLD: 10 % (ref 12–49)
MAGNESIUM SERPL-MCNC: 2.2 MG/DL (ref 1.6–2.4)
MAGNESIUM SERPL-MCNC: 2.5 MG/DL (ref 1.6–2.4)
MCH RBC QN AUTO: 31.9 PG (ref 26–34)
MCHC RBC AUTO-ENTMCNC: 30.2 G/DL (ref 30–36.5)
MCV RBC AUTO: 105.5 FL (ref 80–99)
MONOCYTES # BLD: 0.6 K/UL (ref 0–1)
MONOCYTES NFR BLD: 5 % (ref 5–13)
MYELOCYTES NFR BLD MANUAL: 4 %
NEUTS SEG # BLD: 9.4 K/UL (ref 1.8–8)
NEUTS SEG NFR BLD: 80 % (ref 32–75)
NITRITE UR QL STRIP.AUTO: NEGATIVE
NRBC # BLD: 0 K/UL (ref 0–0.01)
NRBC BLD-RTO: 0 PER 100 WBC
PCO2 BLD: <15 MMHG (ref 35–45)
PH BLD: 6.93 [PH] (ref 7.35–7.45)
PH UR STRIP: 5 [PH] (ref 5–8)
PHOSPHATE SERPL-MCNC: 8.7 MG/DL (ref 2.6–4.7)
PLATELET # BLD AUTO: 243 K/UL (ref 150–400)
PLATELET COMMENTS,PCOM: ABNORMAL
PMV BLD AUTO: 11.3 FL (ref 8.9–12.9)
PO2 BLD: 93 MMHG (ref 80–100)
POTASSIUM SERPL-SCNC: 3.7 MMOL/L (ref 3.5–5.1)
POTASSIUM SERPL-SCNC: 4.9 MMOL/L (ref 3.5–5.1)
POTASSIUM SERPL-SCNC: 6.9 MMOL/L (ref 3.5–5.1)
PROT SERPL-MCNC: 6.5 G/DL (ref 6.4–8.2)
PROT UR STRIP-MCNC: 30 MG/DL
RBC # BLD AUTO: 3.45 M/UL (ref 3.8–5.2)
RBC #/AREA URNS HPF: ABNORMAL /HPF (ref 0–5)
RBC MORPH BLD: ABNORMAL
SERVICE CMNT-IMP: ABNORMAL
SODIUM SERPL-SCNC: 126 MMOL/L (ref 136–145)
SODIUM SERPL-SCNC: 132 MMOL/L (ref 136–145)
SODIUM SERPL-SCNC: 138 MMOL/L (ref 136–145)
SP GR UR REFRACTOMETRY: 1.02 (ref 1–1.03)
SPECIMEN TYPE: ABNORMAL
TOTAL RESP. RATE, ITRR: 22
UA: UC IF INDICATED,UAUC: ABNORMAL
UROBILINOGEN UR QL STRIP.AUTO: 0.2 EU/DL (ref 0.2–1)
WBC # BLD AUTO: 11.8 K/UL (ref 3.6–11)
WBC MORPH BLD: ABNORMAL
WBC URNS QL MICRO: ABNORMAL /HPF (ref 0–4)

## 2020-05-23 PROCEDURE — 96374 THER/PROPH/DIAG INJ IV PUSH: CPT

## 2020-05-23 PROCEDURE — 3331090001 HH PPS REVENUE CREDIT

## 2020-05-23 PROCEDURE — 80053 COMPREHEN METABOLIC PANEL: CPT

## 2020-05-23 PROCEDURE — 36415 COLL VENOUS BLD VENIPUNCTURE: CPT

## 2020-05-23 PROCEDURE — 74011250636 HC RX REV CODE- 250/636: Performed by: EMERGENCY MEDICINE

## 2020-05-23 PROCEDURE — 74011000258 HC RX REV CODE- 258: Performed by: INTERNAL MEDICINE

## 2020-05-23 PROCEDURE — 96375 TX/PRO/DX INJ NEW DRUG ADDON: CPT

## 2020-05-23 PROCEDURE — 77030019905 HC CATH URETH INTMIT MDII -A

## 2020-05-23 PROCEDURE — 77030029684 HC NEB SM VOL KT MONA -A

## 2020-05-23 PROCEDURE — 74011636637 HC RX REV CODE- 636/637: Performed by: EMERGENCY MEDICINE

## 2020-05-23 PROCEDURE — 96376 TX/PRO/DX INJ SAME DRUG ADON: CPT

## 2020-05-23 PROCEDURE — 93005 ELECTROCARDIOGRAM TRACING: CPT

## 2020-05-23 PROCEDURE — 82962 GLUCOSE BLOOD TEST: CPT

## 2020-05-23 PROCEDURE — 74011250636 HC RX REV CODE- 250/636: Performed by: INTERNAL MEDICINE

## 2020-05-23 PROCEDURE — 74011000258 HC RX REV CODE- 258: Performed by: EMERGENCY MEDICINE

## 2020-05-23 PROCEDURE — 74011636637 HC RX REV CODE- 636/637: Performed by: INTERNAL MEDICINE

## 2020-05-23 PROCEDURE — 74011250637 HC RX REV CODE- 250/637: Performed by: INTERNAL MEDICINE

## 2020-05-23 PROCEDURE — 83735 ASSAY OF MAGNESIUM: CPT

## 2020-05-23 PROCEDURE — 81001 URINALYSIS AUTO W/SCOPE: CPT

## 2020-05-23 PROCEDURE — 83036 HEMOGLOBIN GLYCOSYLATED A1C: CPT

## 2020-05-23 PROCEDURE — 85025 COMPLETE CBC W/AUTO DIFF WBC: CPT

## 2020-05-23 PROCEDURE — 99285 EMERGENCY DEPT VISIT HI MDM: CPT

## 2020-05-23 PROCEDURE — 65620000000 HC RM CCU GENERAL

## 2020-05-23 PROCEDURE — 82803 BLOOD GASES ANY COMBINATION: CPT

## 2020-05-23 PROCEDURE — 80048 BASIC METABOLIC PNL TOTAL CA: CPT

## 2020-05-23 PROCEDURE — 3331090002 HH PPS REVENUE DEBIT

## 2020-05-23 PROCEDURE — 96361 HYDRATE IV INFUSION ADD-ON: CPT

## 2020-05-23 PROCEDURE — 94640 AIRWAY INHALATION TREATMENT: CPT

## 2020-05-23 PROCEDURE — 74011000250 HC RX REV CODE- 250: Performed by: EMERGENCY MEDICINE

## 2020-05-23 PROCEDURE — 84100 ASSAY OF PHOSPHORUS: CPT

## 2020-05-23 RX ORDER — PRAVASTATIN SODIUM 40 MG/1
40 TABLET ORAL DAILY
Status: DISCONTINUED | OUTPATIENT
Start: 2020-05-24 | End: 2020-05-28 | Stop reason: HOSPADM

## 2020-05-23 RX ORDER — DOCUSATE SODIUM 100 MG/1
100 CAPSULE, LIQUID FILLED ORAL
Status: DISCONTINUED | OUTPATIENT
Start: 2020-05-23 | End: 2020-05-28 | Stop reason: HOSPADM

## 2020-05-23 RX ORDER — DEXTROSE 50 % IN WATER (D50W) INTRAVENOUS SYRINGE
25-50 AS NEEDED
Status: DISCONTINUED | OUTPATIENT
Start: 2020-05-23 | End: 2020-05-24

## 2020-05-23 RX ORDER — SODIUM POLYSTYRENE SULFONATE 15 G/60ML
15 SUSPENSION ORAL; RECTAL
Status: DISPENSED | OUTPATIENT
Start: 2020-05-23 | End: 2020-05-24

## 2020-05-23 RX ORDER — SODIUM CHLORIDE 0.9 % (FLUSH) 0.9 %
5-40 SYRINGE (ML) INJECTION EVERY 8 HOURS
Status: DISCONTINUED | OUTPATIENT
Start: 2020-05-23 | End: 2020-05-28 | Stop reason: HOSPADM

## 2020-05-23 RX ORDER — METOPROLOL SUCCINATE 25 MG/1
25 TABLET, EXTENDED RELEASE ORAL DAILY
Status: DISCONTINUED | OUTPATIENT
Start: 2020-05-24 | End: 2020-05-28 | Stop reason: HOSPADM

## 2020-05-23 RX ORDER — HEPARIN SODIUM 5000 [USP'U]/ML
5000 INJECTION, SOLUTION INTRAVENOUS; SUBCUTANEOUS EVERY 8 HOURS
Status: DISCONTINUED | OUTPATIENT
Start: 2020-05-23 | End: 2020-05-28 | Stop reason: HOSPADM

## 2020-05-23 RX ORDER — MAGNESIUM SULFATE 100 %
4 CRYSTALS MISCELLANEOUS AS NEEDED
Status: DISCONTINUED | OUTPATIENT
Start: 2020-05-23 | End: 2020-05-24

## 2020-05-23 RX ORDER — SODIUM CHLORIDE 9 MG/ML
125 INJECTION, SOLUTION INTRAVENOUS CONTINUOUS
Status: DISCONTINUED | OUTPATIENT
Start: 2020-05-23 | End: 2020-05-24

## 2020-05-23 RX ORDER — BRIMONIDINE TARTRATE 2 MG/ML
1 SOLUTION/ DROPS OPHTHALMIC 2 TIMES DAILY
Status: DISCONTINUED | OUTPATIENT
Start: 2020-05-23 | End: 2020-05-28 | Stop reason: HOSPADM

## 2020-05-23 RX ORDER — CALCIUM GLUCONATE 94 MG/ML
2 INJECTION, SOLUTION INTRAVENOUS
Status: COMPLETED | OUTPATIENT
Start: 2020-05-23 | End: 2020-05-23

## 2020-05-23 RX ORDER — AMLODIPINE BESYLATE 5 MG/1
10 TABLET ORAL DAILY
Status: DISCONTINUED | OUTPATIENT
Start: 2020-05-24 | End: 2020-05-28 | Stop reason: HOSPADM

## 2020-05-23 RX ORDER — SODIUM BICARBONATE 84 MG/ML
50 INJECTION, SOLUTION INTRAVENOUS
Status: COMPLETED | OUTPATIENT
Start: 2020-05-23 | End: 2020-05-23

## 2020-05-23 RX ORDER — CALCIUM GLUCONATE 94 MG/ML
1 INJECTION, SOLUTION INTRAVENOUS
Status: DISCONTINUED | OUTPATIENT
Start: 2020-05-23 | End: 2020-05-23

## 2020-05-23 RX ORDER — ONDANSETRON 2 MG/ML
4 INJECTION INTRAMUSCULAR; INTRAVENOUS
Status: DISCONTINUED | OUTPATIENT
Start: 2020-05-23 | End: 2020-05-28 | Stop reason: HOSPADM

## 2020-05-23 RX ORDER — CLOPIDOGREL BISULFATE 75 MG/1
75 TABLET ORAL DAILY
Status: DISCONTINUED | OUTPATIENT
Start: 2020-05-24 | End: 2020-05-28 | Stop reason: HOSPADM

## 2020-05-23 RX ORDER — ACETAMINOPHEN 325 MG/1
650 TABLET ORAL
Status: DISCONTINUED | OUTPATIENT
Start: 2020-05-23 | End: 2020-05-28 | Stop reason: HOSPADM

## 2020-05-23 RX ORDER — SODIUM CHLORIDE 0.9 % (FLUSH) 0.9 %
5-40 SYRINGE (ML) INJECTION AS NEEDED
Status: DISCONTINUED | OUTPATIENT
Start: 2020-05-23 | End: 2020-05-28 | Stop reason: HOSPADM

## 2020-05-23 RX ORDER — SODIUM POLYSTYRENE SULFONATE 15 G/60ML
15 SUSPENSION ORAL; RECTAL
Status: DISCONTINUED | OUTPATIENT
Start: 2020-05-23 | End: 2020-05-23

## 2020-05-23 RX ORDER — ALBUTEROL SULFATE 0.83 MG/ML
5 SOLUTION RESPIRATORY (INHALATION)
Status: COMPLETED | OUTPATIENT
Start: 2020-05-23 | End: 2020-05-23

## 2020-05-23 RX ORDER — SODIUM BICARBONATE 1 MEQ/ML
50 SYRINGE (ML) INTRAVENOUS
Status: DISCONTINUED | OUTPATIENT
Start: 2020-05-23 | End: 2020-05-23

## 2020-05-23 RX ORDER — INSULIN LISPRO 100 [IU]/ML
INJECTION, SOLUTION INTRAVENOUS; SUBCUTANEOUS
Status: DISCONTINUED | OUTPATIENT
Start: 2020-05-23 | End: 2020-05-24

## 2020-05-23 RX ORDER — SODIUM POLYSTYRENE SULFONATE 4.1 MEQ/G
15 POWDER, FOR SUSPENSION ORAL; RECTAL
Status: DISCONTINUED | OUTPATIENT
Start: 2020-05-23 | End: 2020-05-23

## 2020-05-23 RX ADMIN — ALBUTEROL SULFATE 5 MG: 2.5 SOLUTION RESPIRATORY (INHALATION) at 18:05

## 2020-05-23 RX ADMIN — SODIUM CHLORIDE 1000 ML: 900 INJECTION, SOLUTION INTRAVENOUS at 16:36

## 2020-05-23 RX ADMIN — SODIUM BICARBONATE 50 MEQ: 84 INJECTION, SOLUTION INTRAVENOUS at 16:55

## 2020-05-23 RX ADMIN — SODIUM CHLORIDE 10.8 UNITS/HR: 9 INJECTION, SOLUTION INTRAVENOUS at 17:53

## 2020-05-23 RX ADMIN — SODIUM CHLORIDE 1000 ML: 900 INJECTION, SOLUTION INTRAVENOUS at 16:58

## 2020-05-23 RX ADMIN — Medication 1 AMPULE: at 21:36

## 2020-05-23 RX ADMIN — SODIUM CHLORIDE 27.1 UNITS/HR: 9 INJECTION, SOLUTION INTRAVENOUS at 21:06

## 2020-05-23 RX ADMIN — SODIUM CHLORIDE 125 ML/HR: 900 INJECTION, SOLUTION INTRAVENOUS at 18:59

## 2020-05-23 RX ADMIN — HUMAN INSULIN 10 UNITS: 100 INJECTION, SOLUTION SUBCUTANEOUS at 16:38

## 2020-05-23 RX ADMIN — HEPARIN SODIUM 5000 UNITS: 5000 INJECTION INTRAVENOUS; SUBCUTANEOUS at 21:46

## 2020-05-23 RX ADMIN — SODIUM CHLORIDE 21.8 UNITS/HR: 9 INJECTION, SOLUTION INTRAVENOUS at 22:16

## 2020-05-23 RX ADMIN — CALCIUM GLUCONATE 2 G: 98 INJECTION, SOLUTION INTRAVENOUS at 18:08

## 2020-05-23 RX ADMIN — Medication 10 ML: at 21:46

## 2020-05-23 RX ADMIN — SODIUM CHLORIDE 500 ML: 900 INJECTION, SOLUTION INTRAVENOUS at 19:40

## 2020-05-23 NOTE — H&P
Hospitalist Admission Note    NAME: Curry Ag   :  1940   MRN:  [id number]     Date/Time:  2020 6:14 PM    Patient PCP: Shankar Dominguez MD  ________________________________________________________________________    My assessment of this patient's clinical condition and my plan of care is as follows. Assessment / Plan:  Diabetic ketoacidosis  History of diabetes mellitus type 1  High anion gap metabolic acidosis due to DKA  Pseudohyponatremia  Spurious hyperkalemia  Acute kidney injury due to dehydration  -Patient was noted to have low blood sugars and she was given [facility] and started becoming more drowsy and was noted to have high blood sugars later in the afternoon  -She is currently in DKA  -Initiate DKA protocol. Start IV insulin. She received 2 L of IV fluids in the ED. We will start normal saline at 125 mL/h. Recheck BMP, magnesium every 4 hours and replace as needed  -She received calcium gluconate, insulin and also high-dose albuterol for hyperkalemia. We will give 1 dose of Kayexalate 15 g x 1. Recheck BMP in 4 hours. EKG shows normal T waves and intervals are normal.  -Creatinine at baseline is normal but currently 1.7 likely from dehydration.   Continue aggressive IV hydration and repeat BMP in a.m.  -no exposure to any one with covid 19 per family and no respiratory symtoms    Hypertension  Dyslipidemia  Hypothyroidism  History of dementia  History of CVA  -Continue home metoprolol, Norvasc  -Continue home Pravachol  -Continue home levothyroxine  -Continue supportive care for dementia  -Continue home Plavix    Code Status: Full code  Surrogate Decision Maker: 2 sons and 1 daughter    I updated patient's daughter Lacie Gamble over the phone    DVT Prophylaxis: Heparin  GI Prophylaxis: not indicated    Baseline: From home, has dementia at baseline but is independent of ADLs        Subjective:   CHIEF COMPLAINT: Abnormal blood sugars    HISTORY OF PRESENT ILLNESS:     Jessy Cintron is a 67 y.o.   female who presents with past medical history of diabetes mellitus, hypertension, dyslipidemia, dementia is coming to the hospital chief complaints of abnormal blood sugars. Patient was recently in her usual state of health until about 2 days ago when he started not feeling well and did not eat well. She was noted to have low blood sugars early this a.m. for which EMS was summoned and was apparently given \"glucose \"but we are not sure what was given at home which was given. By late afternoon, she became more drowsy for which EMS was called again and they noticed her blood sugars to be very high and she was also more drowsy and had difficulty maintaining alertness for which she was brought to the hospital for further evaluation. I personally spoke to patient's daughter over the phone and she informed me that patient was not eating or drinking well in the last 2 days and also reported some nausea. On arrival to the hospital, her vital signs are within normal limits. On lab work she was noted to have high blood sugar of more than 1000, bicarb of 5, potassium 6.9 and sodium 126. She was noted to be in DKA and was given IV fluids, IV insulin drip and also received calcium gluconate, albuterol for hyperkalemia. We were asked to admit for work up and evaluation of the above problems.      Past Medical History:   Diagnosis Date    Heart failure ([address]     unknown to family    Hypertension     Stroke [facility])         Past Surgical History:   Procedure Laterality Date    HX GYN      HX HEENT      thyroidectomy    HX HYSTERECTOMY      REMOVAL GALLBLADDER      THYROIDECTOMY         Social History     Tobacco Use    Smoking status: Never Smoker    Smokeless tobacco: Never Used   Substance Use Topics    Alcohol use: No     Comment: been years        Family History   Problem Relation Age of Onset    Diabetes Father     No Known Problems Mother      No Known Allergies Prior to Admission medications    Medication Sig Start Date End Date Taking? Authorizing Provider   metoprolol succinate (TOPROL-XL) 25 mg XL tablet TAKE 1 TABLET BY MOUTH EVERY DAY 5/11/20   Luis Alberto Tolbert MD   insulin degludec Heppner Siddharth FlexTouch U-100) 100 unit/mL (3 mL) inpn 18 Units by SubCUTAneous route daily. 4/25/20   Luis Alberto Tolbert MD   pravastatin (PRAVACHOL) 80 mg tablet TAKE 1 TABLET BY MOUTH at bedtime 2/27/20   Luis Alberto Tolbert MD   levothyroxine (SYNTHROID) 175 mcg tablet TAKE 1 TABLET BY MOUTH EVERY DAY 2/27/20   Luis Alberto Tolbert MD   clopidogreL (PLAVIX) 75 mg tab TAKE 1 TABLET BY MOUTH EVERY DAY 2/27/20   Luis Alberto Tolbert MD   amLODIPine (NORVASC) 10 mg tablet TAKE 1 TABLET BY MOUTH EVERY DAY IN THE MORNING 2/27/20   Luis Alberto Tolbert MD   flash glucose scanning reader (FREESTYLE KEVON 14 DAY READER) [facility] As directed1 2/27/20   Luis Alberto Tolbert MD   flash glucose sensor (FREESTYLE KEVON 14 DAY SENSOR) kit Continuous monitoring 2/27/20   Luis Alberto Tolbert MD   glucose blood VI test strips (ONETOUCH ULTRA BLUE TEST STRIP) strip USE TO CHECK BLOOD SUGARS DAILY. Dx: E11.649 2/25/20   Luis Alberto Tolbert MD   Blood-Glucose Meter (ONETOUCH ULTRA2 METER) misc USE TO CHECK BLOOD SUGARS DAILY. 2/25/20   Luis Alberto Tolbert MD   insulin aspart U-100 (NOVOLOG) 100 unit/mL (3 mL) inpn 4 units AC breakfast,lunch, and dinner  Patient taking differently: 4 Units by SubCUTAneous route Before breakfast, lunch, and dinner. 4 units AC breakfast,lunch, and dinner 6/15/19   Luis Alberto Tolbert MD   brimonidine (ALPHAGAN) 0.15 % ophthalmic solution Administer 1 Drop to both eyes two (2) times a day. 1/30/15   Provider, Historical       REVIEW OF SYSTEMS:     I am not able to complete the review of systems because:    The patient is intubated and sedated   y The patient has altered mental status due to his acute medical problems    The patient has baseline aphasia from prior stroke(s)    The patient has baseline dementia and is not reliable historian    The patient is in acute medical distress and unable to provide information           Total of 12 systems reviewed as follows:       POSITIVE= underlined text  Negative = text not underlined  General:  fever, chills, sweats, generalized weakness, weight loss/gain,      loss of appetite   Eyes:    blurred vision, eye pain, loss of vision, double vision  ENT:    rhinorrhea, pharyngitis   Respiratory:   cough, sputum production, SOB, AGUILERA, wheezing, pleuritic pain   Cardiology:   chest pain, palpitations, orthopnea, PND, edema, syncope   Gastrointestinal:  abdominal pain , N/V, diarrhea, dysphagia, constipation, bleeding   Genitourinary:  frequency, urgency, dysuria, hematuria, incontinence   Muskuloskeletal :  arthralgia, myalgia, back pain  Hematology:  easy bruising, nose or gum bleeding, lymphadenopathy   Dermatological: rash, ulceration, pruritis, color change / jaundice  Endocrine:   hot flashes or polydipsia   Neurological:  headache, dizziness, confusion, focal weakness, paresthesia,     Speech difficulties, memory loss, gait difficulty  Psychological: Feelings of anxiety, depression, agitation    Objective:   VITALS:    Visit Vitals  BP (!) 113/32 (BP 1 Location: Right arm, BP Patient Position: At rest)   Pulse 90   Temp 98.4 °F (36.9 °C)   Resp (!) 33   Ht 5' 2\" (1.575 m)   Wt 65.8 kg (145 lb 1 oz)   SpO2 100%   BMI 26.53 kg/m²       PHYSICAL EXAM:    General:    no distress, appears stated age. HEENT: Atraumatic, anicteric sclerae, pink conjunctivae     No oral ulcers, mucosa moist, throat clear, dentition fair  Neck:  Supple, symmetrical,  thyroid: non tender  Lungs:   Clear to auscultation bilaterally. No Wheezing or Rhonchi. No rales. Chest wall:  No tenderness  No Accessory muscle use. Heart:   Regular  rhythm,  No  murmur   No edema  Abdomen:   Soft, non-tender. Not distended. Bowel sounds normal  Extremities: No cyanosis.   No clubbing,      Skin turgor normal, Capillary refill normal, Radial dial pulse 2+  Skin:     Not pale. Not Jaundiced  No rashes   Psych:  Not anxious or agitated. Neurologic: Drowsy but wakes up to commands, oriented x1, able to move all 4 extremities against gravity, sensation is intact  _______________________________________________________________________  Care Plan discussed with:    Comments   Patient y    Family      RN y    Care Manager                    Consultant:      _______________________________________________________________________  Expected  Disposition:   Home with Family y   HH/PT/OT/RN    SNF/LTC    TAYA    ________________________________________________________________________  TOTAL TIME:  61 Minutes    Critical Care Provided     Minutes non procedure based      Comments    y Reviewed previous records   >50% of visit spent in counseling and coordination of care y Discussion with patient and/or family and questions answered       ________________________________________________________________________  Signed: Benjamin Andrew MD    Procedures: see electronic medical records for all procedures/Xrays and details which were not copied into this note but were reviewed prior to creation of Plan. LAB DATA REVIEWED:    Recent Results (from the past 24 hour(s))   GLUCOSE, POC    Collection Time: 05/23/20  4:20 PM   Result Value Ref Range    Glucose (POC) >600 (HH) 65 - 100 mg/dL    Performed by Michael CANTRELL (traveler)    GLUCOSE, POC    Collection Time: 05/23/20  4:23 PM   Result Value Ref Range    Glucose (POC) >600 (HH) 65 - 100 mg/dL    Performed by Michael CANTRELL (traveler)    POC EG7    Collection Time: 05/23/20  4:41 PM   Result Value Ref Range    Calcium, ionized (POC) 1.14 1.12 - 1.32 mmol/L    pH (POC) 6.933 (LL) 7.35 - 7.45      pCO2 (POC) <15.0 (L) 35.0 - 45.0 MMHG    pO2 (POC) 93 80 - 100 MMHG    Site OTHER      Device: ROOM AIR      Allens test (POC) N/A      Specimen type (POC) VENOUS BLOOD      Total resp. rate 22     CBC WITH AUTOMATED DIFF    Collection Time: 05/23/20  4:42 PM   Result Value Ref Range    WBC 11.8 (H) 3.6 - 11.0 K/uL    RBC 3.45 (L) 3.80 - 5.20 M/uL    HGB 11.0 (L) 11.5 - 16.0 g/dL    HCT 36.4 35.0 - 47.0 %    .5 (H) 80.0 - 99.0 FL    MCH 31.9 26.0 - 34.0 PG    MCHC 30.2 30.0 - 36.5 g/dL    RDW 14.9 (H) 11.5 - 14.5 %    PLATELET 540 755 - 729 K/uL    MPV 11.3 8.9 - 12.9 FL    NRBC 0.0 0  WBC    ABSOLUTE NRBC 0.00 0.00 - 0.01 K/uL    NEUTROPHILS 80 (H) 32 - 75 %    LYMPHOCYTES 10 (L) 12 - 49 %    MONOCYTES 5 5 - 13 %    EOSINOPHILS 0 0 - 7 %    BASOPHILS 1 0 - 1 %    MYELOCYTES 4 %    IMMATURE GRANULOCYTES 0 0.0 - 0.5 %    ABS. NEUTROPHILS 9.4 (H) 1.8 - 8.0 K/UL    ABS. LYMPHOCYTES 1.2 0.8 - 3.5 K/UL    ABS. MONOCYTES 0.6 0.0 - 1.0 K/UL    ABS. EOSINOPHILS 0.0 0.0 - 0.4 K/UL    ABS. BASOPHILS 0.1 0.0 - 0.1 K/UL    ABS. IMM. GRANS. 0.0 0.00 - 0.04 K/UL    DF MANUAL      PLATELET COMMENTS LARGE PLATELETS      RBC COMMENTS MACROCYTOSIS      WBC COMMENTS LEFT SHIFT     METABOLIC PANEL, COMPREHENSIVE    Collection Time: 05/23/20  4:42 PM   Result Value Ref Range    Sodium 126 (L) 136 - 145 mmol/L    Potassium 6.9 (HH) 3.5 - 5.1 mmol/L    Chloride 91 (L) 97 - 108 mmol/L    CO2 <5 (LL) 21 - 32 mmol/L    Anion gap Cannot be calculated 5 - 15 mmol/L    Glucose 1,062 (HH) 65 - 100 mg/dL    BUN 37 (H) 6 - 20 MG/DL    Creatinine 1.77 (H) 0.55 - 1.02 MG/DL    BUN/Creatinine ratio 21 (H) 12 - 20      GFR est AA 34 (L) >60 ml/min/1.73m2    GFR est non-AA 28 (L) >60 ml/min/1.73m2    Calcium 8.8 8.5 - 10.1 MG/DL    Bilirubin, total 0.5 0.2 - 1.0 MG/DL    ALT (SGPT) 53 12 - 78 U/L    AST (SGOT) 64 (H) 15 - 37 U/L    Alk.  phosphatase 185 (H) 45 - 117 U/L    Protein, total 6.5 6.4 - 8.2 g/dL    Albumin 3.5 3.5 - 5.0 g/dL    Globulin 3.0 2.0 - 4.0 g/dL    A-G Ratio 1.2 1.1 - 2.2     GLUCOSE, POC    Collection Time: 05/23/20  5:48 PM   Result Value Ref Range    Glucose (POC) >600 (HH) 65 - 100 mg/dL Performed by Leopoldo Brandt BSN (traveler)    GLUCOSE, POC    Collection Time: 05/23/20  5:51 PM   Result Value Ref Range    Glucose (POC) >600 () 65 - 100 mg/dL    Performed by Leopoldo Brandt BSN (traveler)    EKG, 12 LEAD, INITIAL    Collection Time: 05/23/20  6:01 PM   Result Value Ref Range    Ventricular Rate 75 BPM    Atrial Rate 75 BPM    P-R Interval 172 ms    QRS Duration 86 ms    Q-T Interval 398 ms    QTC Calculation (Bezet) 444 ms    Calculated P Axis 38 degrees    Calculated R Axis -44 degrees    Calculated T Axis -4 degrees    Diagnosis       Sinus rhythm with premature atrial complexes  Possible Left atrial enlargement  Left axis deviation  Septal infarct , age undetermined  ST & T wave abnormality, consider anterior ischemia  When compared with ECG of 23-APR-2020 01:52,  Significant changes have occurred

## 2020-05-23 NOTE — ED NOTES
1926: Assumed care of patient from [address] at this time. Patient resting in bed at this time. A/o x4 but remains lethargic. Monitor x3. Hospitalist notified in person at this time of patient's low BP. Orders received for bolus fluids at this time. Repeat dysphagia screen completed. Patient remains arousable but unable to remain alert. Will continue to hold Kayexalate. 2030: Patient condition unchanged at this time. Remains on the monitor x3. SR x2    2050: TRANSFER - OUT REPORT:    Verbal report given to [id number] [place] [facility] RN (name) on Felicity Hawk  being transferred to ICU (unit) for routine progression of care       Report consisted of patients Situation, Background, Assessment and   Recommendations(SBAR). Information from the following report(s) SBAR, Kardex, ED Summary, Intake/Output, MAR and Recent Results was reviewed with the receiving nurse. Lines:   Peripheral IV 05/23/20 Left Antecubital (Active)   Site Assessment Clean, dry, & intact 5/23/2020  4:36 PM   Phlebitis Assessment 0 5/23/2020  4:36 PM   Infiltration Assessment 0 5/23/2020  4:36 PM   Dressing Status Clean, dry, & intact 5/23/2020  4:36 PM   Dressing Type Tape;Transparent 5/23/2020  4:36 PM   Hub Color/Line Status Green;Flushed;Patent 5/23/2020  4:36 PM   Action Taken Blood drawn 5/23/2020  4:36 PM       Peripheral IV 05/23/20 Right Arm (Active)   Site Assessment Clean, dry, & intact 5/23/2020  5:56 PM   Phlebitis Assessment 0 5/23/2020  5:56 PM   Infiltration Assessment 0 5/23/2020  5:56 PM   Dressing Status Clean, dry, & intact 5/23/2020  5:56 PM   Dressing Type Tape;Transparent 5/23/2020  5:56 PM   Hub Color/Line Status Green;Flushed 5/23/2020  5:56 PM   Alcohol Cap Used Yes 5/23/2020  5:56 PM        Opportunity for questions and clarification was provided.       Patient transported with:   Registered Nurse

## 2020-05-23 NOTE — ED NOTES
Pt arrives to the ED via ambulance with a c/c of high blood sugar onset PTA. Pt is a known Diabetic who called EMS for a LOW blood sugar earlier today. Medics attended pt at her house, administered IV meds to increase BSL. EMS who arrives pt to ED is unable to say what med the pt received at home. As per EMS pt was found by her son in her room with rapid breathing and a \"HIGH\" BSL, 10 minutes after med administration. Pt's son called EMS once again, BSL also read \"HIGH\" for EMS, pt brought to the ED. Pt is noted drowsy but open eyes to voice and is oriented x4. Now in ED room with side rail up, bed to lowest position and call light within reach. EDP at bedside to evaluate, will continue to monitor.

## 2020-05-23 NOTE — ACP (ADVANCE CARE PLANNING)
Advance Care Planning Note      NAME: Chastity Handley   :  1940   MRN:  [id number]     Date/Time:  2020 6:25 PM    Active Diagnoses:  Hospital Problems  Date Reviewed: 2020          Codes Class Noted POA    DKA (diabetic ketoacidoses) [facility] [place] ICD-10-CM: E11.10  ICD-9-CM: 250.12  2017 Unknown          Diabetic ketoacidosis  History of diabetes mellitus type 1  High anion gap metabolic acidosis due to DKA  Pseudohyponatremia  Spurious hyperkalemia  Acute kidney injury due to dehydration    These active diagnoses are of sufficient risk that focused discussion on advance care planning is indicated in order to allow the patient to thoughtfully consider personal goals of care, and if situations arise that prevent the ability to personally give input, to ensure appropriate representation of their personal desires for different levels and aggressiveness of care. Discussion:   Code status addressed and wants to be a Full Code. Patient wants central line and vasopressors if needed. Patient would also want a feeding tube, if needed, for nutritional support. Patient  would like to assign son Jennyfer Noel and DTR Bert Eastman  as the surrogate decision maker. Persons present and participating in discussion: Cherrie Barron MD, Dtr Bert Eastman. Time Spent:   Total time spent face-to-face in education and discussion:   16   minutes.          Santy Al MD   Hospitalist

## 2020-05-24 VITALS
TEMPERATURE: 98.8 F | SYSTOLIC BLOOD PRESSURE: 108 MMHG | OXYGEN SATURATION: 100 % | HEART RATE: 74 BPM | DIASTOLIC BLOOD PRESSURE: 53 MMHG

## 2020-05-24 LAB
ANION GAP SERPL CALC-SCNC: 13 MMOL/L (ref 5–15)
ANION GAP SERPL CALC-SCNC: 6 MMOL/L (ref 5–15)
ANION GAP SERPL CALC-SCNC: 7 MMOL/L (ref 5–15)
ATRIAL RATE: 75 BPM
BASOPHILS # BLD: 0 K/UL (ref 0–0.1)
BASOPHILS NFR BLD: 0 % (ref 0–1)
BUN SERPL-MCNC: 19 MG/DL (ref 6–20)
BUN SERPL-MCNC: 21 MG/DL (ref 6–20)
BUN SERPL-MCNC: 28 MG/DL (ref 6–20)
BUN/CREAT SERPL: 20 (ref 12–20)
BUN/CREAT SERPL: 20 (ref 12–20)
BUN/CREAT SERPL: 23 (ref 12–20)
CALCIUM SERPL-MCNC: 7.8 MG/DL (ref 8.5–10.1)
CALCIUM SERPL-MCNC: 8 MG/DL (ref 8.5–10.1)
CALCIUM SERPL-MCNC: 8.5 MG/DL (ref 8.5–10.1)
CALCULATED P AXIS, ECG09: 38 DEGREES
CALCULATED R AXIS, ECG10: -44 DEGREES
CALCULATED T AXIS, ECG11: -4 DEGREES
CHLORIDE SERPL-SCNC: 110 MMOL/L (ref 97–108)
CHLORIDE SERPL-SCNC: 111 MMOL/L (ref 97–108)
CHLORIDE SERPL-SCNC: 111 MMOL/L (ref 97–108)
CO2 SERPL-SCNC: 20 MMOL/L (ref 21–32)
CO2 SERPL-SCNC: 25 MMOL/L (ref 21–32)
CO2 SERPL-SCNC: 26 MMOL/L (ref 21–32)
CREAT SERPL-MCNC: 0.82 MG/DL (ref 0.55–1.02)
CREAT SERPL-MCNC: 1.04 MG/DL (ref 0.55–1.02)
CREAT SERPL-MCNC: 1.4 MG/DL (ref 0.55–1.02)
DIAGNOSIS, 93000: NORMAL
DIFFERENTIAL METHOD BLD: ABNORMAL
EOSINOPHIL # BLD: 0 K/UL (ref 0–0.4)
EOSINOPHIL NFR BLD: 0 % (ref 0–7)
ERYTHROCYTE [DISTWIDTH] IN BLOOD BY AUTOMATED COUNT: 13.7 % (ref 11.5–14.5)
GLUCOSE BLD STRIP.AUTO-MCNC: 104 MG/DL (ref 65–100)
GLUCOSE BLD STRIP.AUTO-MCNC: 108 MG/DL (ref 65–100)
GLUCOSE BLD STRIP.AUTO-MCNC: 160 MG/DL (ref 65–100)
GLUCOSE BLD STRIP.AUTO-MCNC: 207 MG/DL (ref 65–100)
GLUCOSE BLD STRIP.AUTO-MCNC: 211 MG/DL (ref 65–100)
GLUCOSE BLD STRIP.AUTO-MCNC: 241 MG/DL (ref 65–100)
GLUCOSE BLD STRIP.AUTO-MCNC: 263 MG/DL (ref 65–100)
GLUCOSE BLD STRIP.AUTO-MCNC: 316 MG/DL (ref 65–100)
GLUCOSE BLD STRIP.AUTO-MCNC: 40 MG/DL (ref 65–100)
GLUCOSE BLD STRIP.AUTO-MCNC: 44 MG/DL (ref 65–100)
GLUCOSE BLD STRIP.AUTO-MCNC: 45 MG/DL (ref 65–100)
GLUCOSE BLD STRIP.AUTO-MCNC: 49 MG/DL (ref 65–100)
GLUCOSE BLD STRIP.AUTO-MCNC: 66 MG/DL (ref 65–100)
GLUCOSE BLD STRIP.AUTO-MCNC: 66 MG/DL (ref 65–100)
GLUCOSE BLD STRIP.AUTO-MCNC: 79 MG/DL (ref 65–100)
GLUCOSE BLD STRIP.AUTO-MCNC: 80 MG/DL (ref 65–100)
GLUCOSE BLD STRIP.AUTO-MCNC: 85 MG/DL (ref 65–100)
GLUCOSE BLD STRIP.AUTO-MCNC: 87 MG/DL (ref 65–100)
GLUCOSE BLD STRIP.AUTO-MCNC: 88 MG/DL (ref 65–100)
GLUCOSE BLD STRIP.AUTO-MCNC: 91 MG/DL (ref 65–100)
GLUCOSE BLD STRIP.AUTO-MCNC: 94 MG/DL (ref 65–100)
GLUCOSE SERPL-MCNC: 211 MG/DL (ref 65–100)
GLUCOSE SERPL-MCNC: 67 MG/DL (ref 65–100)
GLUCOSE SERPL-MCNC: 80 MG/DL (ref 65–100)
HCT VFR BLD AUTO: 33.4 % (ref 35–47)
HGB BLD-MCNC: 11.5 G/DL (ref 11.5–16)
IMM GRANULOCYTES # BLD AUTO: 0.1 K/UL (ref 0–0.04)
IMM GRANULOCYTES NFR BLD AUTO: 1 % (ref 0–0.5)
LYMPHOCYTES # BLD: 0.8 K/UL (ref 0.8–3.5)
LYMPHOCYTES NFR BLD: 6 % (ref 12–49)
MAGNESIUM SERPL-MCNC: 1.7 MG/DL (ref 1.6–2.4)
MCH RBC QN AUTO: 31.9 PG (ref 26–34)
MCHC RBC AUTO-ENTMCNC: 34.4 G/DL (ref 30–36.5)
MCV RBC AUTO: 92.5 FL (ref 80–99)
MONOCYTES # BLD: 0.9 K/UL (ref 0–1)
MONOCYTES NFR BLD: 7 % (ref 5–13)
NEUTS SEG # BLD: 11.1 K/UL (ref 1.8–8)
NEUTS SEG NFR BLD: 86 % (ref 32–75)
NRBC # BLD: 0 K/UL (ref 0–0.01)
NRBC BLD-RTO: 0 PER 100 WBC
P-R INTERVAL, ECG05: 172 MS
PLATELET # BLD AUTO: 208 K/UL (ref 150–400)
PMV BLD AUTO: 10.3 FL (ref 8.9–12.9)
POTASSIUM SERPL-SCNC: 2.8 MMOL/L (ref 3.5–5.1)
POTASSIUM SERPL-SCNC: 3.1 MMOL/L (ref 3.5–5.1)
POTASSIUM SERPL-SCNC: 3.6 MMOL/L (ref 3.5–5.1)
Q-T INTERVAL, ECG07: 398 MS
QRS DURATION, ECG06: 86 MS
QTC CALCULATION (BEZET), ECG08: 444 MS
RBC # BLD AUTO: 3.61 M/UL (ref 3.8–5.2)
SERVICE CMNT-IMP: ABNORMAL
SERVICE CMNT-IMP: NORMAL
SODIUM SERPL-SCNC: 142 MMOL/L (ref 136–145)
SODIUM SERPL-SCNC: 143 MMOL/L (ref 136–145)
SODIUM SERPL-SCNC: 144 MMOL/L (ref 136–145)
VENTRICULAR RATE, ECG03: 75 BPM
WBC # BLD AUTO: 12.9 K/UL (ref 3.6–11)

## 2020-05-24 PROCEDURE — 74011250637 HC RX REV CODE- 250/637: Performed by: INTERNAL MEDICINE

## 2020-05-24 PROCEDURE — 74011250636 HC RX REV CODE- 250/636: Performed by: INTERNAL MEDICINE

## 2020-05-24 PROCEDURE — 65620000000 HC RM CCU GENERAL

## 2020-05-24 PROCEDURE — 74011000258 HC RX REV CODE- 258: Performed by: INTERNAL MEDICINE

## 2020-05-24 PROCEDURE — 3331090001 HH PPS REVENUE CREDIT

## 2020-05-24 PROCEDURE — 83735 ASSAY OF MAGNESIUM: CPT

## 2020-05-24 PROCEDURE — 80048 BASIC METABOLIC PNL TOTAL CA: CPT

## 2020-05-24 PROCEDURE — 85025 COMPLETE CBC W/AUTO DIFF WBC: CPT

## 2020-05-24 PROCEDURE — 74011000250 HC RX REV CODE- 250: Performed by: INTERNAL MEDICINE

## 2020-05-24 PROCEDURE — 74011636637 HC RX REV CODE- 636/637: Performed by: INTERNAL MEDICINE

## 2020-05-24 PROCEDURE — 36415 COLL VENOUS BLD VENIPUNCTURE: CPT

## 2020-05-24 PROCEDURE — 82962 GLUCOSE BLOOD TEST: CPT

## 2020-05-24 PROCEDURE — 74011250637 HC RX REV CODE- 250/637: Performed by: NURSE PRACTITIONER

## 2020-05-24 PROCEDURE — 3331090002 HH PPS REVENUE DEBIT

## 2020-05-24 RX ORDER — DEXTROSE 50 % IN WATER (D50W) INTRAVENOUS SYRINGE
12.5-25 AS NEEDED
Status: DISCONTINUED | OUTPATIENT
Start: 2020-05-24 | End: 2020-05-28 | Stop reason: HOSPADM

## 2020-05-24 RX ORDER — MAGNESIUM SULFATE 100 %
4 CRYSTALS MISCELLANEOUS AS NEEDED
Status: DISCONTINUED | OUTPATIENT
Start: 2020-05-24 | End: 2020-05-28 | Stop reason: HOSPADM

## 2020-05-24 RX ORDER — INSULIN LISPRO 100 [IU]/ML
INJECTION, SOLUTION INTRAVENOUS; SUBCUTANEOUS
Status: DISCONTINUED | OUTPATIENT
Start: 2020-05-24 | End: 2020-05-26

## 2020-05-24 RX ORDER — POTASSIUM CHLORIDE 750 MG/1
40 TABLET, FILM COATED, EXTENDED RELEASE ORAL
Status: COMPLETED | OUTPATIENT
Start: 2020-05-24 | End: 2020-05-24

## 2020-05-24 RX ORDER — SODIUM BICARBONATE 84 MG/ML
50 INJECTION, SOLUTION INTRAVENOUS ONCE
Status: COMPLETED | OUTPATIENT
Start: 2020-05-24 | End: 2020-05-24

## 2020-05-24 RX ORDER — DEXTROSE MONOHYDRATE AND SODIUM CHLORIDE 5; .45 G/100ML; G/100ML
125 INJECTION, SOLUTION INTRAVENOUS CONTINUOUS
Status: DISCONTINUED | OUTPATIENT
Start: 2020-05-24 | End: 2020-05-24

## 2020-05-24 RX ORDER — DEXTROSE, SODIUM CHLORIDE, AND POTASSIUM CHLORIDE 5; .45; .15 G/100ML; G/100ML; G/100ML
125 INJECTION INTRAVENOUS CONTINUOUS
Status: DISCONTINUED | OUTPATIENT
Start: 2020-05-24 | End: 2020-05-25

## 2020-05-24 RX ORDER — INSULIN GLARGINE 100 [IU]/ML
2 INJECTION, SOLUTION SUBCUTANEOUS DAILY
Status: DISCONTINUED | OUTPATIENT
Start: 2020-05-24 | End: 2020-05-25

## 2020-05-24 RX ADMIN — DEXTROSE MONOHYDRATE 12.5 G: 25 INJECTION, SOLUTION INTRAVENOUS at 10:05

## 2020-05-24 RX ADMIN — HEPARIN SODIUM 5000 UNITS: 5000 INJECTION INTRAVENOUS; SUBCUTANEOUS at 20:33

## 2020-05-24 RX ADMIN — BRIMONIDINE TARTRATE 1 DROP: 2 SOLUTION OPHTHALMIC at 08:16

## 2020-05-24 RX ADMIN — Medication 10 ML: at 05:44

## 2020-05-24 RX ADMIN — HEPARIN SODIUM 5000 UNITS: 5000 INJECTION INTRAVENOUS; SUBCUTANEOUS at 14:19

## 2020-05-24 RX ADMIN — INSULIN LISPRO 1 UNITS: 100 INJECTION, SOLUTION INTRAVENOUS; SUBCUTANEOUS at 21:54

## 2020-05-24 RX ADMIN — DEXTROSE MONOHYDRATE 25 G: 25 INJECTION, SOLUTION INTRAVENOUS at 08:41

## 2020-05-24 RX ADMIN — DEXTROSE MONOHYDRATE 25 G: 25 INJECTION, SOLUTION INTRAVENOUS at 15:15

## 2020-05-24 RX ADMIN — DEXTROSE MONOHYDRATE 22 G: 25 INJECTION, SOLUTION INTRAVENOUS at 07:02

## 2020-05-24 RX ADMIN — DEXTROSE MONOHYDRATE, SODIUM CHLORIDE, AND POTASSIUM CHLORIDE 125 ML/HR: 50; 4.5; 1.49 INJECTION, SOLUTION INTRAVENOUS at 08:19

## 2020-05-24 RX ADMIN — POTASSIUM CHLORIDE 40 MEQ: 750 TABLET, FILM COATED, EXTENDED RELEASE ORAL at 08:20

## 2020-05-24 RX ADMIN — HEPARIN SODIUM 5000 UNITS: 5000 INJECTION INTRAVENOUS; SUBCUTANEOUS at 05:46

## 2020-05-24 RX ADMIN — Medication 30 ML: at 14:00

## 2020-05-24 RX ADMIN — DEXTROSE MONOHYDRATE 16 G: 25 INJECTION, SOLUTION INTRAVENOUS at 10:22

## 2020-05-24 RX ADMIN — DEXTROSE MONOHYDRATE 4 G: 25 INJECTION, SOLUTION INTRAVENOUS at 07:20

## 2020-05-24 RX ADMIN — DEXTROSE MONOHYDRATE AND SODIUM CHLORIDE 125 ML/HR: 5; .45 INJECTION, SOLUTION INTRAVENOUS at 03:14

## 2020-05-24 RX ADMIN — Medication 10 ML: at 20:31

## 2020-05-24 RX ADMIN — Medication 1 AMPULE: at 08:16

## 2020-05-24 RX ADMIN — Medication 1 AMPULE: at 20:29

## 2020-05-24 RX ADMIN — SODIUM CHLORIDE 10.3 UNITS/HR: 9 INJECTION, SOLUTION INTRAVENOUS at 02:37

## 2020-05-24 RX ADMIN — BRIMONIDINE TARTRATE 1 DROP: 2 SOLUTION OPHTHALMIC at 17:39

## 2020-05-24 RX ADMIN — DEXTROSE MONOHYDRATE, SODIUM CHLORIDE, AND POTASSIUM CHLORIDE 125 ML/HR: 50; 4.5; 1.49 INJECTION, SOLUTION INTRAVENOUS at 15:21

## 2020-05-24 RX ADMIN — SODIUM BICARBONATE 50 MEQ: 84 INJECTION, SOLUTION INTRAVENOUS at 01:15

## 2020-05-24 RX ADMIN — INSULIN GLARGINE 2 UNITS: 100 INJECTION, SOLUTION SUBCUTANEOUS at 11:48

## 2020-05-24 NOTE — PROGRESS NOTES
I reviewed pertinent labs and imaging, and discussed /agreed on the plan of care with Dr. Manuelito Sen. Hospitalist Progress Note    NAME: Marysol Rios   :  1940   MRN:  [id number]     Patent of Dr. Kathe Snyder, will follow patient this weekend. He will pick patient back up on Tuesday. Assessment / Plan:  Diabetic ketoacidosis  Type one DM   Pseudohyponatremia  Spurious hyperkalemia  Acute kidney injury d/t dehydration  -Patient was noted to have low blood sugars and she was given [facility] and started becoming more drowsy and was noted to have high blood sugars later in the afternoon  -She was started on DKA protocol on admission. She received 2 L of IV fluids in the ED. -She received calcium gluconate, insulin and also high-dose albuterol for hyperkalemia. -She received 1 dose of Kayexalate 15 g x 1.   -Creatinine at baseline is normal but currently 1.04 likely from dehydration. Improved from 1.7 on admission.    -K 2.8; replete and monitor  -GAP closed x 2  -Insulin gtt stopped   -Continue D5 1/2NS with 20K    Hypertension  Dyslipidemia  Hypothyroidism  History of dementia  History of CVA  -Continue home metoprolol, Norvasc  -Continue home Pravachol  -Continue home levothyroxine  -Continue supportive care for dementia  -Continue home Plavix    25.0 - 29.9 Overweight / Body mass index is 26.73 kg/m². While to patient is requiring ICU level of care in the intensive care unit the hospitalist team will be following peripherally. Code status: Full  Prophylaxis: Hep SQ  Recommended Disposition: Home w/Family     Subjective:     Chief Complaint / Reason for Physician Visit  Follow-up DKA. Discussed with RN events overnight.      Review of Systems:  Symptom Y/N Comments  Symptom Y/N Comments   Fever/Chills    Chest Pain     Poor Appetite    Edema     Cough    Abdominal Pain     Sputum    Joint Pain     SOB/AGUILERA    Pruritis/Rash     Nausea/vomit    Tolerating PT/OT     Diarrhea    Tolerating Diet Constipation    Other       Could NOT obtain due to:      Objective:     VITALS:   Last 24hrs VS reviewed since prior progress note. Most recent are:  Patient Vitals for the past 24 hrs:   Temp Pulse Resp BP SpO2   05/24/20 0900 98.3 °F (36.8 °C) 78 19 98/62 98 %   05/24/20 0800     99 %   05/24/20 0700  81 19 131/41 97 %   05/24/20 0600  80 19 119/44 98 %   05/24/20 0500  80 19 (!) 130/39 98 %   05/24/20 0400 98.2 °F (36.8 °C) 84 21 151/45 99 %   05/24/20 0300  79 18 137/41 97 %   05/24/20 0200  78 25 139/40 99 %   05/24/20 0100  78 22 136/42 99 %   05/24/20 0000 97.8 °F (36.6 °C) 75 20 134/45 100 %   05/23/20 2300  72 25 (!) 99/22 100 %   05/23/20 2200  68 18 (!) 102/34 100 %   05/23/20 2132 97.7 °F (36.5 °C) 75 21 (!) 119/38 100 %   05/23/20 2110 97.4 °F (36.3 °C) 72 25 (!) 112/39 100 %   05/23/20 2030  72 25 (!) 108/39 100 %   05/23/20 2015  72 22 (!) 111/39 100 %   05/23/20 2000  70 27 (!) 109/37 100 %   05/23/20 1930  69 (!) 31 97/40 100 %   05/23/20 1915  74 27 (!) 112/39 100 %   05/23/20 1900  75 27 (!) 109/39 100 %   05/23/20 1845  78 29 (!) 117/38 100 %   05/23/20 1830  78 (!) 31 (!) 116/33 100 %   05/23/20 1815  74 30  100 %   05/23/20 1805     100 %   05/23/20 1800  77 26  100 %   05/23/20 1745  77 27 (!) 108/35 100 %   05/23/20 1730  84 26  100 %   05/23/20 1715  81 25  100 %   05/23/20 1700  84 28 (!) 109/35 100 %   05/23/20 1645  88 29 (!) 111/35 100 %   05/23/20 1630  81 26 (!) 115/33 100 %   05/23/20 1615 98.4 °F (36.9 °C) 90 (!) 33 (!) 113/32 96 %       Intake/Output Summary (Last 24 hours) at 5/24/2020 1012  Last data filed at 5/24/2020 0800  Gross per 24 hour   Intake 4054.77 ml   Output    Net 4054.77 ml        PHYSICAL EXAM:  General: No acute distress     EENT:  EOMI. Anicteric sclerae. MMM  Resp:  CTA bilaterally, no wheezing or rales.   No accessory muscle use  CV:  Regular rate rhythm,  No edema  GI:  Soft, Non distended, Non tender.  +Bowel sounds  Neurologic:  Alert and oriented. normal speech,   Psych:   Fair insight. Not anxious nor agitated  Skin:  No rashes. No jaundice    Reviewed most current lab test results and cultures  YES  Reviewed most current radiology test results   YES  Review and summation of old records today    NO  Reviewed patient's current orders and MAR    YES  PMH/SH reviewed - no change compared to H&P  ________________________________________________________________________  Care Plan discussed with:    Comments   Patient     Family      RN     Care Manager     Consultant                        Multidiciplinary team rounds were held today with , nursing, pharmacist and clinical coordinator. Patient's plan of care was discussed; medications were reviewed and discharge planning was addressed. ________________________________________________________________________    Total CRITICAL CARE TIME Spent:   Minutes non procedure based      Comments   >50% of visit spent in counseling and coordination of care     ________________________________________________________________________  Jacob Tanner NP     Procedures: see electronic medical records for all procedures/Xrays and details which were not copied into this note but were reviewed prior to creation of Plan. LABS:  I reviewed today's most current labs and imaging studies.   Pertinent labs include:  Recent Labs     05/24/20  0310 05/23/20  1642   WBC 12.9* 11.8*   HGB 11.5 11.0*   HCT 33.4* 36.4    243     Recent Labs     05/24/20  0752 05/24/20  0310 05/23/20  2248 05/23/20  1819 05/23/20  1642    143 138 132* 126*   K 2.8* 3.1* 3.7 4.9 6.9*   * 110* 104 98 91*   CO2 26 20* 7* <5* <5*   GLU 80 211* 520* 841* 1,062*   BUN 21* 28* 36* 36* 37*   CREA 1.04* 1.40* 1.69* 1.75* 1.77*   CA 8.0* 8.5 8.9 9.4 8.8   MG 1.7  --  2.2 2.5*  --    PHOS  --   --   --  8.7*  --    ALB  --   --   --   --  3.5   TBILI  --   --   --   --  0.5   SGOT  -- --   --   --  64*   ALT  --   --   --   --  53       Signed: Shelagh Peabody, NP

## 2020-05-24 NOTE — PROGRESS NOTES
2130- Unable to complete admission data base at this time due to patient's mentation. Primary Nurse Gallo Ji, VIN and Juan Alberto Ramos RN performed a dual skin assessment on this patient No impairment noted  Madhu score is 13    0010- MD paged regarding critical lab results. Eutychus.Forward- RN to page in house MD.     Estelita Meals to Dr. Ericka Miguel MD informed critical lab results, orders placed. Pati.Standing- MD paged. 46- Spoke to Dr. Theresa Cassidy MD aware of blood sugar below 250, orders placed. 12- Patient's Iv's infiltrated, Iv infusions stopped.    1983- ER pacing Ivs.   0700- Report to [facility]

## 2020-05-24 NOTE — PROGRESS NOTES
3375: Patient's blood sugar is 44, recheck from 40 at 0657. Insulin infusion already off. Giving 22 G of D50W now per Glucostablizer. 1559: Blood sugar 79. Giving 4 G of D50W per Glucostablizer. 1575: Blood sugar 87. Insulin infusion restarted at 0.1 units/hr. 5611: Pt. Assessed. Pt. Alert and oriented to self and place. Pt. Responds easily to voice when asleep. Follows commands. Moves all extremities. Turns self in bed. Pt. Has a large Watery BM just now, Pt. Had 7 BM's overnight. Will speak to Dr. Clarice Butcher regarding this. Pt. On room air. Breath sounds clear. Bowel sounds active. Pt. NPO at this time. Skin intact. Pulses all palpable. Pt. NSR on the monitor. VSS. Pt. Has 3 PIV's. All flush and patent. D5 NS with 20 K now infusing. 0840: Blood sugar is 45, recheck from 49 at 0839. Insulin infusion now stopped again. Giving 25 G of D50W per Glucostablizer. 9260: Blood glucose 88. At this time will not restart insulin infusion without speaking to Dr. Yulissa Patel first. Patient's GAP has now been closed X2.     0930: Pt. Resting comfortably. Pt. Was able to take her PO potassium for a K level of 2.8 on Q4 labs. 1004: Pt.'s blood sugar is 66. 12.5 G of D50W given now per Glucostablizer. 1022: Pt.'s blood sugar is 80. 16 G of D50W given now per Glucostablizer. 1037: Spoke to Dr. Clarice Butcher regarding patient's Low blood sugars, Diarrhea, Potassium level of 2.8, and Patient's NPO status. Pt.'s Anion GAP has been closed X2. Awating orders to stop drip, add Lantus, a diet, and to check patient for CDIFF. 1045: Repeat blood sugar 94. Insulin infusion now officially stopped and discontinued. 1200: Pt. Reassessed. Pt. drowsy  But response easily to voice ad touch. No other real changes to assessment. 1400: Periwick placed as patient is incontinent of urine. Pt. Has not had another BM since CDIFF sample orders were placed. Will send sample as soon as patient has another BM. 1514: Pt.'s blood sugar is 66. 25 G of PRN D50W now given. 1539: Pt.'s blood glucose is now 108. Pt. Reassessed. Pt. Alert and oriented. Pt. awake watching TV and eating a snack. Pt. Denies pain/Nausea/dyspnea. VSS.     1900: Report given to Pamela Eraly RN.

## 2020-05-24 NOTE — PROGRESS NOTES
1900 - Report from [facility], [address] [facility] paged about Mg 1.6, Phos 2.2              2g of Mg ordered.   0700 - Report to Tenzin Eastman

## 2020-05-24 NOTE — PROGRESS NOTES
5/24/2020    INTENSIVIST PROGRESS NOTE:     Patient seen and evaluated, chart reviewed. Admitted overnight with DKA. Started on insulin drip. AG improving. Currently sleepy, limiting history from the patient.      Visit Vitals  /41   Pulse 81   Temp 98.2 °F (36.8 °C)   Resp 19   Ht 5' 2\" (1.575 m)   Wt 66.3 kg (146 lb 2.6 oz)   SpO2 97%   BMI 26.73 kg/m²       General: NAD  Eyes: anicteric  HEENT: NC/AT  CV: RRR  Lungs: CTA B  Abd: soft, +BS  Ext: no cyanosis, no clubbing  Skin: warm and dry  Musculoskeletal: no gross deformities  Neuro: no localizing findings    CXR: none    Labs reviewed    A/P:  - DM1 with DKA - continue with insulin drip protocol until AG closed x 2  - ONEYDA - IVF  - replete electrolytes  - HTN  - h/o stroke  - hypothyroid   - DVT prophylaxis  Yoselin Pruitt MD

## 2020-05-25 LAB
ANION GAP SERPL CALC-SCNC: 5 MMOL/L (ref 5–15)
BUN SERPL-MCNC: 13 MG/DL (ref 6–20)
BUN/CREAT SERPL: 19 (ref 12–20)
CALCIUM SERPL-MCNC: 7.6 MG/DL (ref 8.5–10.1)
CHLORIDE SERPL-SCNC: 109 MMOL/L (ref 97–108)
CO2 SERPL-SCNC: 24 MMOL/L (ref 21–32)
CREAT SERPL-MCNC: 0.7 MG/DL (ref 0.55–1.02)
ERYTHROCYTE [DISTWIDTH] IN BLOOD BY AUTOMATED COUNT: 14.9 % (ref 11.5–14.5)
GLUCOSE BLD STRIP.AUTO-MCNC: 150 MG/DL (ref 65–100)
GLUCOSE BLD STRIP.AUTO-MCNC: 154 MG/DL (ref 65–100)
GLUCOSE BLD STRIP.AUTO-MCNC: 257 MG/DL (ref 65–100)
GLUCOSE BLD STRIP.AUTO-MCNC: 348 MG/DL (ref 65–100)
GLUCOSE SERPL-MCNC: 269 MG/DL (ref 65–100)
HCT VFR BLD AUTO: 31.5 % (ref 35–47)
HGB BLD-MCNC: 11.1 G/DL (ref 11.5–16)
MAGNESIUM SERPL-MCNC: 1.6 MG/DL (ref 1.6–2.4)
MCH RBC QN AUTO: 32.2 PG (ref 26–34)
MCHC RBC AUTO-ENTMCNC: 35.2 G/DL (ref 30–36.5)
MCV RBC AUTO: 91.3 FL (ref 80–99)
NRBC # BLD: 0 K/UL (ref 0–0.01)
NRBC BLD-RTO: 0 PER 100 WBC
PHOSPHATE SERPL-MCNC: 2.2 MG/DL (ref 2.6–4.7)
PLATELET # BLD AUTO: 170 K/UL (ref 150–400)
PMV BLD AUTO: 10.6 FL (ref 8.9–12.9)
POTASSIUM SERPL-SCNC: 3.8 MMOL/L (ref 3.5–5.1)
RBC # BLD AUTO: 3.45 M/UL (ref 3.8–5.2)
SERVICE CMNT-IMP: ABNORMAL
SODIUM SERPL-SCNC: 138 MMOL/L (ref 136–145)
WBC # BLD AUTO: 8.3 K/UL (ref 3.6–11)

## 2020-05-25 PROCEDURE — 74011250637 HC RX REV CODE- 250/637: Performed by: INTERNAL MEDICINE

## 2020-05-25 PROCEDURE — 80048 BASIC METABOLIC PNL TOTAL CA: CPT

## 2020-05-25 PROCEDURE — 83735 ASSAY OF MAGNESIUM: CPT

## 2020-05-25 PROCEDURE — 74011250637 HC RX REV CODE- 250/637: Performed by: NURSE PRACTITIONER

## 2020-05-25 PROCEDURE — 74011250636 HC RX REV CODE- 250/636: Performed by: HOSPITALIST

## 2020-05-25 PROCEDURE — 84100 ASSAY OF PHOSPHORUS: CPT

## 2020-05-25 PROCEDURE — 74011000250 HC RX REV CODE- 250: Performed by: NURSE PRACTITIONER

## 2020-05-25 PROCEDURE — 74011250636 HC RX REV CODE- 250/636: Performed by: INTERNAL MEDICINE

## 2020-05-25 PROCEDURE — 36415 COLL VENOUS BLD VENIPUNCTURE: CPT

## 2020-05-25 PROCEDURE — 74011250636 HC RX REV CODE- 250/636: Performed by: NURSE PRACTITIONER

## 2020-05-25 PROCEDURE — 65270000029 HC RM PRIVATE

## 2020-05-25 PROCEDURE — 82962 GLUCOSE BLOOD TEST: CPT

## 2020-05-25 PROCEDURE — 3331090001 HH PPS REVENUE CREDIT

## 2020-05-25 PROCEDURE — 74011636637 HC RX REV CODE- 636/637: Performed by: INTERNAL MEDICINE

## 2020-05-25 PROCEDURE — 65620000000 HC RM CCU GENERAL

## 2020-05-25 PROCEDURE — 3331090002 HH PPS REVENUE DEBIT

## 2020-05-25 PROCEDURE — 85027 COMPLETE CBC AUTOMATED: CPT

## 2020-05-25 RX ORDER — TRAZODONE HYDROCHLORIDE 50 MG/1
50 TABLET ORAL
Status: DISCONTINUED | OUTPATIENT
Start: 2020-05-25 | End: 2020-05-28 | Stop reason: HOSPADM

## 2020-05-25 RX ORDER — MAGNESIUM SULFATE HEPTAHYDRATE 40 MG/ML
2 INJECTION, SOLUTION INTRAVENOUS ONCE
Status: COMPLETED | OUTPATIENT
Start: 2020-05-25 | End: 2020-05-25

## 2020-05-25 RX ORDER — INSULIN GLARGINE 100 [IU]/ML
8 INJECTION, SOLUTION SUBCUTANEOUS DAILY
Status: DISCONTINUED | OUTPATIENT
Start: 2020-05-25 | End: 2020-05-26

## 2020-05-25 RX ADMIN — DIBASIC SODIUM PHOSPHATE, MONOBASIC POTASSIUM PHOSPHATE AND MONOBASIC SODIUM PHOSPHATE 2 TABLET: 852; 155; 130 TABLET ORAL at 14:52

## 2020-05-25 RX ADMIN — Medication 1 AMPULE: at 09:00

## 2020-05-25 RX ADMIN — METOPROLOL SUCCINATE 25 MG: 25 TABLET, EXTENDED RELEASE ORAL at 08:00

## 2020-05-25 RX ADMIN — HEPARIN SODIUM 5000 UNITS: 5000 INJECTION INTRAVENOUS; SUBCUTANEOUS at 14:52

## 2020-05-25 RX ADMIN — PRAVASTATIN SODIUM 40 MG: 40 TABLET ORAL at 08:00

## 2020-05-25 RX ADMIN — AMLODIPINE BESYLATE 10 MG: 5 TABLET ORAL at 08:00

## 2020-05-25 RX ADMIN — TRAZODONE HYDROCHLORIDE 50 MG: 50 TABLET ORAL at 21:22

## 2020-05-25 RX ADMIN — CLOPIDOGREL BISULFATE 75 MG: 75 TABLET ORAL at 08:00

## 2020-05-25 RX ADMIN — Medication 10 ML: at 21:22

## 2020-05-25 RX ADMIN — HEPARIN SODIUM 5000 UNITS: 5000 INJECTION INTRAVENOUS; SUBCUTANEOUS at 06:11

## 2020-05-25 RX ADMIN — LEVOTHYROXINE SODIUM 175 MCG: 0.03 TABLET ORAL at 06:11

## 2020-05-25 RX ADMIN — Medication 40 ML: at 14:00

## 2020-05-25 RX ADMIN — HEPARIN SODIUM 5000 UNITS: 5000 INJECTION INTRAVENOUS; SUBCUTANEOUS at 21:21

## 2020-05-25 RX ADMIN — BRIMONIDINE TARTRATE 1 DROP: 2 SOLUTION OPHTHALMIC at 17:04

## 2020-05-25 RX ADMIN — BRIMONIDINE TARTRATE 1 DROP: 2 SOLUTION OPHTHALMIC at 08:00

## 2020-05-25 RX ADMIN — INSULIN GLARGINE 8 UNITS: 100 INJECTION, SOLUTION SUBCUTANEOUS at 11:07

## 2020-05-25 RX ADMIN — INSULIN LISPRO 4 UNITS: 100 INJECTION, SOLUTION INTRAVENOUS; SUBCUTANEOUS at 07:31

## 2020-05-25 RX ADMIN — MAGNESIUM SULFATE HEPTAHYDRATE 2 G: 40 INJECTION, SOLUTION INTRAVENOUS at 06:42

## 2020-05-25 RX ADMIN — INSULIN LISPRO 3 UNITS: 100 INJECTION, SOLUTION INTRAVENOUS; SUBCUTANEOUS at 11:08

## 2020-05-25 RX ADMIN — Medication 10 ML: at 06:14

## 2020-05-25 RX ADMIN — Medication 1 AMPULE: at 20:45

## 2020-05-25 NOTE — PROGRESS NOTES
0730: Report received from [facility].    0800: Pt. Assessed. Pt. Alert and oriented to self and place. Pt. Responds easily to voice when asleep. Follows commands. Moves all extremities. Pt. Now up to the recliner with 1 assist.     Pt. On room air. Breath sounds clear. Bowel sounds active. Pt.eating meals well. D5 NS with 20K off since 2000 last night. Skin intact. Pulses all palpable. Pt. NSR on the monitor. VSS.      Pt. Has 3 PIV's. All flush and patent. Dr. Alec Walker at the bedside. D/C Cdiff testing as patient has not had another BM since her diarrhea 5/23 (on admission), over 24 hours ago.       0930: Pt. Resting comfortably. Pt. Now has medical transfer orders. Paging Renetta Correia regarding patient's phos level of 2.2. Awaiting orders. 1242: Pt. Reassessed. No changes. Pt. Has been in the recliner all day. Ambulating to the bedside commode with one assist.     1300: Pt. Now placed back in bed for a nap.     1400L Attempted to infuse ordered potassium phosphate. Infusion has now inflitrated 2 out of 3 of the patient's IV's and patient is a hard stick. Will page Vijaya Rod NP and make her aware.     1600: Pt. Back up to the chair for dinner. 1800: Pt. Resting comfortably in the recliner. 1900: Report given to Radha Bernabe RN. Pt. Resting comfortably in the recliner.

## 2020-05-25 NOTE — PROGRESS NOTES
1900 Bedside report received from [address] [place] [address].    8252 Ate 100% of hs snack. 2215 Attempting to get out of bed. States she needs to get home. Reoriented pt to surroundings and situation. Asst with repositioning for comfort. Room darkened and bed alarm set. Visible from nurses station. 0600 Awake most of the shift. AM care and skin care provided. Offered to asst to chair for breakfast, declined stated she \"better take a nap\"    0630 Sleeping soundly.

## 2020-05-25 NOTE — PROGRESS NOTES
5/25/2020    INTENSIVIST PROGRESS NOTE:     Patient seen and evaluated, chart reviewed. Admitted 5-23-20 with DKA. Started on insulin drip. AG now closed and off insulin drip. She is awake and eating well with increasing blood sugars.      Visit Vitals  BP (!) 147/108   Pulse 83   Temp 98.1 °F (36.7 °C)   Resp 22   Ht 5' 2\" (1.575 m)   Wt 66.3 kg (146 lb 2.6 oz)   SpO2 100%   BMI 26.73 kg/m²       General: NAD  Eyes: anicteric  HEENT: NC/AT  CV: RRR  Lungs: CTA B  Abd: soft, +BS  Ext: no cyanosis, no clubbing  Skin: warm and dry  Musculoskeletal: no gross deformities  Neuro: no localizing findings    Labs reviewed    A/P:  - DM1 with DKA - adjust insulin (increase lantus)  - ONEYDA - resolved  - replete electrolytes as needed  - HTN  - h/o stroke  - hypothyroid   - DVT prophylaxis  - transfer to floor  Amari Zimmerman MD

## 2020-05-25 NOTE — PROGRESS NOTES
I reviewed pertinent labs and imaging, and discussed /agreed on the plan of care with Dr. Princess Parker. Hospitalist Progress Note    NAME: Clara Hebert   :  1940   MRN:  [id number]     Patent of Dr. Richard Vyas, will follow patient this weekend. He will pick patient back up on Tuesday. Assessment / Plan:  Diabetic ketoacidosis, POA  Type one DM   Pseudohyponatremia- resolved  Spurious hyperkalemia- resolved  Acute kidney injury d/t dehydration- resolved  -Patient was noted to have low blood sugars and she was given [facility] and started becoming more drowsy and was noted to have high blood sugars later in the afternoon  -She was started on DKA protocol on admission. She received 2 L of IV fluids in the ED. -She received calcium gluconate, insulin and also high-dose albuterol for hyperkalemia. -She received 1 dose of Kayexalate 15 g x 1. -GAP closed   -Insulin gtt stopped   -Discontinued D5 1/2NS with 20K  -Hgb A1c 11.8  -BS AC&HS  -SSI  -Continue Lantus 8 units daily     Hypertension  Dyslipidemia  Hypothyroidism  History of dementia  History of CVA  -Continue home metoprolol, Norvasc  -Continue home Pravachol  -Continue home levothyroxine  -Continue supportive care for dementia  -Continue home Plavix    25.0 - 29.9 Overweight / Body mass index is 26.73 kg/m². While to patient is requiring ICU level of care in the intensive care unit the hospitalist team will be following peripherally. Code status: Full  Prophylaxis: Hep SQ  Recommended Disposition: Home w/Family     Subjective:     Chief Complaint / Reason for Physician Visit  Follow-up DKA. Discussed with RN events overnight.      Review of Systems:  Symptom Y/N Comments  Symptom Y/N Comments   Fever/Chills    Chest Pain     Poor Appetite    Edema     Cough    Abdominal Pain     Sputum    Joint Pain     SOB/AGUILERA    Pruritis/Rash     Nausea/vomit    Tolerating PT/OT     Diarrhea    Tolerating Diet     Constipation    Other       Could NOT obtain due to:      Objective:     VITALS:   Last 24hrs VS reviewed since prior progress note. Most recent are:  Patient Vitals for the past 24 hrs:   Temp Pulse Resp BP SpO2   05/25/20 1000  80 20 (!) 119/93 100 %   05/25/20 0900  85 18     05/25/20 0800 98.1 °F (36.7 °C) 80 21 126/50 100 %   05/25/20 0715  79 26 139/49 100 %   05/25/20 0700  83 22 (!) 147/108 100 %   05/25/20 0600  81 18 146/59 100 %   05/25/20 0500  82 16  99 %   05/25/20 0400 98.1 °F (36.7 °C) 83 20  99 %   05/25/20 0300  82 22 97/81 100 %   05/25/20 0200  80 20 139/49 98 %   05/25/20 0100  84 21 (!) 135/34 99 %   05/25/20 0000 98.6 °F (37 °C) 79 20 137/42 100 %   05/24/20 2300  77 23 125/45 100 %   05/24/20 2200  77 20 128/46 99 %   05/24/20 2100  75 21 135/46 100 %   05/24/20 2000 98.5 °F (36.9 °C) 71 21 113/42 100 %   05/24/20 1900  75 21 (!) 99/36 100 %   05/24/20 1828  79 22 129/40 100 %   05/24/20 1800  83 20  100 %   05/24/20 1700  80 24 152/46 100 %   05/24/20 1600 99 °F (37.2 °C) 77 18 137/43 100 %   05/24/20 1500  77 22 117/64 99 %   05/24/20 1408  78 20 135/42 100 %   05/24/20 1400  76 20  100 %   05/24/20 1300  75 21 (!) 129/39 99 %   05/24/20 1200 98.9 °F (37.2 °C) 72 18 (!) 116/38 98 %       Intake/Output Summary (Last 24 hours) at 5/25/2020 1116  Last data filed at 5/25/2020 1034  Gross per 24 hour   Intake 2537.92 ml   Output 800 ml   Net 1737.92 ml        PHYSICAL EXAM:  General: NAD. Elderly female. EENT:  EOMI. Anicteric sclerae. MMM  Resp:  LS clear, no wheezing or rales. No accessory muscle use  CV:  RRR,  No edema  GI:  Soft, Non distended, Non tender.  +Bowel sounds  Neurologic:  Alert and oriented. normal speech,   Psych:   Fair insight. Not anxious nor agitated  Skin:  No rashes.   No jaundice    Reviewed most current lab test results and cultures  YES  Reviewed most current radiology test results   YES  Review and summation of old records today    NO  Reviewed patient's current orders and [facility] YES  PMH/SH reviewed - no change compared to H&P  ________________________________________________________________________  Care Plan discussed with:    Comments   Patient x    Family      RN x    Care Manager     Consultant                        Multidiciplinary team rounds were held today with , nursing, pharmacist and clinical coordinator. Patient's plan of care was discussed; medications were reviewed and discharge planning was addressed. ________________________________________________________________________    Total CRITICAL CARE TIME Spent:   Minutes non procedure based      Comments   >50% of visit spent in counseling and coordination of care     ________________________________________________________________________  Ted Mims NP     Procedures: see electronic medical records for all procedures/Xrays and details which were not copied into this note but were reviewed prior to creation of Plan. LABS:  I reviewed today's most current labs and imaging studies.   Pertinent labs include:  Recent Labs     05/25/20  0513 05/24/20  0310 05/23/20  1642   WBC 8.3 12.9* 11.8*   HGB 11.1* 11.5 11.0*   HCT 31.5* 33.4* 36.4    208 243     Recent Labs     05/25/20  0513 05/24/20  1417 05/24/20  0752  05/23/20  2248 05/23/20  1819  05/23/20  1642    142 144   < > 138 132*  --  126*   K 3.8 3.6 2.8*   < > 3.7 4.9  --  6.9*   * 111* 111*   < > 104 98  --  91*   CO2 24 25 26   < > 7* <5*  --  <5*   * 67 80   < > 520* 841*  --  1,062*   BUN 13 19 21*   < > 36* 36*  --  37*   CREA 0.70 0.82 1.04*   < > 1.69* 1.75*  --  1.77*   CA 7.6* 7.8* 8.0*   < > 8.9 9.4  --  8.8   MG 1.6  --  1.7  --  2.2 2.5*   < >  --    PHOS 2.2*  --   --   --   --  8.7*  --   --    ALB  --   --   --   --   --   --   --  3.5   TBILI  --   --   --   --   --   --   --  0.5   SGOT  --   --   --   --   --   --   --  64*   ALT  --   --   --   --   --   --   --  53    < > = values in this interval not displayed.        Signed: Jacob Tanner NP

## 2020-05-25 NOTE — PROGRESS NOTES
Nutrition Assessment:    RECOMMENDATIONS:   Continue Diabetic diet as tolerated  RD to add Glucerna BID    DIETITIANS INTERVENTIONS/PLAN:   Continue diet  Add PO supplements  Monitor appetite/PO intake    ASSESSMENT:   Pt admitted with DKA. PMH: CVA, HTN, DM, dementia. Chart reviewed, no rounds held 2' holiday. MST triggered for unsure of wt loss. Pts wt's are varied per EMR (140-160's). Appetite fair at this time. Will add PO supplements BID as there is report she was not eating well for a couple days leading up to admission. -348. Phos being repleted. Will monitor appetite. SUBJECTIVE/OBJECTIVE:     Diet Order: Consistent carb  % Eaten:    Patient Vitals for the past 72 hrs:   % Diet Eaten   05/25/20 1034 (P) 50 %   05/24/20 1432 50 %     Pertinent Medications:lantus, humalog, Kphos      Chemistries:  Lab Results   Component Value Date/Time    Sodium 138 05/25/2020 05:13 AM    Potassium 3.8 05/25/2020 05:13 AM    Chloride 109 (H) 05/25/2020 05:13 AM    CO2 24 05/25/2020 05:13 AM    Anion gap 5 05/25/2020 05:13 AM    Glucose 269 (H) 05/25/2020 05:13 AM    BUN 13 05/25/2020 05:13 AM    Creatinine 0.70 05/25/2020 05:13 AM    BUN/Creatinine ratio 19 05/25/2020 05:13 AM    GFR est AA >60 05/25/2020 05:13 AM    GFR est non-AA >60 05/25/2020 05:13 AM    Calcium 7.6 (L) 05/25/2020 05:13 AM    AST (SGOT) 64 (H) 05/23/2020 04:42 PM    Alk. phosphatase 185 (H) 05/23/2020 04:42 PM    Protein, total 6.5 05/23/2020 04:42 PM    Albumin 3.5 05/23/2020 04:42 PM    Globulin 3.0 05/23/2020 04:42 PM    A-G Ratio 1.2 05/23/2020 04:42 PM    ALT (SGPT) 53 05/23/2020 04:42 PM      Anthropometrics: Height: 5' 2\" (157.5 cm) Weight: 66.3 kg (146 lb 2.6 oz)  [] standing scale     [x]bed scale    []stated   []unknown    IBW (%IBW):   ( ) UBW (%UBW):   (  %)    BMI: Body mass index is 26.73 kg/m².     This BMI is indicative of:  []Underweight   [x]Normal(for age)   []Overweight   [] Obesity   [] Extreme Obesity (BMI>40)  Estimated Nutrition Needs (Based on): 1500 Kcals/day(MSJ 1096 x 1.3) , 53 g(-66 (0.8-1gPro/kg) ) Protein  Carbohydrate: At Least 130 g/day  Fluids: 1500 mL/day    Last BM: 5/24   [x]Active     []Hyperactive  []Hypoactive       [] Absent   BS  Skin:    [x] Intact   [] Incision  [] Breakdown   [] DTI   [] Tears/Excoriation/Abrasion  [x]Edema(trace-all extremities)  [] Other: Wt Readings from Last 30 Encounters:   05/23/20 66.3 kg (146 lb 2.6 oz)   05/05/20 72.6 kg (160 lb)   04/27/20 65.8 kg (145 lb)   04/24/20 66.2 kg (146 lb)   02/27/20 68.1 kg (150 lb 1.6 oz)   02/19/20 58.1 kg (128 lb 1.4 oz)   01/16/20 67.4 kg (148 lb 8 oz)   01/02/20 74.3 kg (163 lb 12.8 oz)   12/25/19 66 kg (145 lb 8.1 oz)   12/03/19 75.5 kg (166 lb 7.2 oz)   11/26/19 76.8 kg (169 lb 4.8 oz)   11/21/19 73.9 kg (163 lb)   11/05/19 74.5 kg (164 lb 3.2 oz)   10/15/19 73.7 kg (162 lb 8 oz)   07/09/19 74.5 kg (164 lb 4.8 oz)   07/02/19 73.6 kg (162 lb 4.8 oz)   06/18/19 74.7 kg (164 lb 9.6 oz)   06/11/19 75.8 kg (167 lb)   05/28/19 72.6 kg (160 lb 1.6 oz)   04/04/19 74.5 kg (164 lb 4.8 oz)   02/12/19 73.8 kg (162 lb 12.8 oz)   01/08/19 75.9 kg (167 lb 6.4 oz)   12/04/18 76.6 kg (168 lb 14.4 oz)   11/15/18 78.4 kg (172 lb 14.4 oz)   11/08/18 76.8 kg (169 lb 5 oz)   11/03/18 77.1 kg (170 lb)   11/01/18 79.3 kg (174 lb 14.4 oz)   10/21/18 81 kg (178 lb 9.2 oz)   10/15/18 76.1 kg (167 lb 12.8 oz)   10/09/18 78.9 kg (173 lb 15.1 oz)      NUTRITION DIAGNOSES:   Problem:  Altered nutrition-related lab values      Etiology: related to hx of DM      Signs/Symptoms: as evidenced by BG up to 348 and admission with DKA. NUTRITION INTERVENTIONS:  Meals/Snacks: General/healthful diet   Supplements: Commercial supplement              GOAL:   Pt will consume >50% of meals/supplements in 3-5 days.      Cultural, Baptist, or Ethnic Dietary Needs: None     LEARNING NEEDS (Diet, Food/Nutrient-Drug Interaction):    [x] None Identified   [] Identified and Education Provided/Documented   [] Identified and Pt declined/was not appropriate      [x] Interdisciplinary Care Plan Reviewed/Documented    [x] Participated in Discharge Planning: Diabetic diet   [] Interdisciplinary Rounds     NUTRITION RISK:    [] High              [x] Moderate           []  Low  []  Minimal/Uncompromised    PT SEEN FOR:    []  MD Consult: []Calorie Count      []Diabetic Diet Education        []Diet Education     []Electrolyte Management     []General Nutrition Management and Supplements     []Management of Tube Feeding     []TPN Recommendations    [x]  RN Referral:  [x]MST score >=2     []Enteral/Parenteral Nutrition PTA     []Pregnant: Gestational DM or Multigestation   []  Low BMI  []  Re-Screen   []  LOS   []  NPO/clears x 5 days   []  New TF/TPN    Terry Chacko RD, [address]    Pager [phone]  Weekend Pager [phone]

## 2020-05-26 LAB
ANION GAP SERPL CALC-SCNC: 7 MMOL/L (ref 5–15)
BUN SERPL-MCNC: 11 MG/DL (ref 6–20)
BUN/CREAT SERPL: 19 (ref 12–20)
CALCIUM SERPL-MCNC: 7.3 MG/DL (ref 8.5–10.1)
CHLORIDE SERPL-SCNC: 108 MMOL/L (ref 97–108)
CO2 SERPL-SCNC: 23 MMOL/L (ref 21–32)
CREAT SERPL-MCNC: 0.58 MG/DL (ref 0.55–1.02)
ERYTHROCYTE [DISTWIDTH] IN BLOOD BY AUTOMATED COUNT: 15 % (ref 11.5–14.5)
GLUCOSE BLD STRIP.AUTO-MCNC: 175 MG/DL (ref 65–100)
GLUCOSE BLD STRIP.AUTO-MCNC: 197 MG/DL (ref 65–100)
GLUCOSE BLD STRIP.AUTO-MCNC: 336 MG/DL (ref 65–100)
GLUCOSE BLD STRIP.AUTO-MCNC: 343 MG/DL (ref 65–100)
GLUCOSE BLD STRIP.AUTO-MCNC: 358 MG/DL (ref 65–100)
GLUCOSE SERPL-MCNC: 103 MG/DL (ref 65–100)
HCT VFR BLD AUTO: 31.1 % (ref 35–47)
HGB BLD-MCNC: 10.5 G/DL (ref 11.5–16)
MAGNESIUM SERPL-MCNC: 1.9 MG/DL (ref 1.6–2.4)
MCH RBC QN AUTO: 31.5 PG (ref 26–34)
MCHC RBC AUTO-ENTMCNC: 33.8 G/DL (ref 30–36.5)
MCV RBC AUTO: 93.4 FL (ref 80–99)
NRBC # BLD: 0 K/UL (ref 0–0.01)
NRBC BLD-RTO: 0 PER 100 WBC
PHOSPHATE SERPL-MCNC: 3 MG/DL (ref 2.6–4.7)
PLATELET # BLD AUTO: 144 K/UL (ref 150–400)
PMV BLD AUTO: 10.9 FL (ref 8.9–12.9)
POTASSIUM SERPL-SCNC: 3.8 MMOL/L (ref 3.5–5.1)
RBC # BLD AUTO: 3.33 M/UL (ref 3.8–5.2)
SERVICE CMNT-IMP: ABNORMAL
SODIUM SERPL-SCNC: 138 MMOL/L (ref 136–145)
WBC # BLD AUTO: 5.1 K/UL (ref 3.6–11)

## 2020-05-26 PROCEDURE — 84100 ASSAY OF PHOSPHORUS: CPT

## 2020-05-26 PROCEDURE — 82962 GLUCOSE BLOOD TEST: CPT

## 2020-05-26 PROCEDURE — 36415 COLL VENOUS BLD VENIPUNCTURE: CPT

## 2020-05-26 PROCEDURE — 80048 BASIC METABOLIC PNL TOTAL CA: CPT

## 2020-05-26 PROCEDURE — 3331090002 HH PPS REVENUE DEBIT

## 2020-05-26 PROCEDURE — 3331090001 HH PPS REVENUE CREDIT

## 2020-05-26 PROCEDURE — 74011636637 HC RX REV CODE- 636/637: Performed by: INTERNAL MEDICINE

## 2020-05-26 PROCEDURE — 74011250636 HC RX REV CODE- 250/636: Performed by: INTERNAL MEDICINE

## 2020-05-26 PROCEDURE — 74011000250 HC RX REV CODE- 250: Performed by: INTERNAL MEDICINE

## 2020-05-26 PROCEDURE — 83735 ASSAY OF MAGNESIUM: CPT

## 2020-05-26 PROCEDURE — 65270000015 HC RM PRIVATE ONCOLOGY

## 2020-05-26 PROCEDURE — 74011250637 HC RX REV CODE- 250/637: Performed by: INTERNAL MEDICINE

## 2020-05-26 PROCEDURE — 85027 COMPLETE CBC AUTOMATED: CPT

## 2020-05-26 PROCEDURE — 74011250637 HC RX REV CODE- 250/637: Performed by: NURSE PRACTITIONER

## 2020-05-26 RX ORDER — INSULIN GLARGINE 100 [IU]/ML
14 INJECTION, SOLUTION SUBCUTANEOUS DAILY
Status: DISCONTINUED | OUTPATIENT
Start: 2020-05-26 | End: 2020-05-27

## 2020-05-26 RX ORDER — INSULIN LISPRO 100 [IU]/ML
INJECTION, SOLUTION INTRAVENOUS; SUBCUTANEOUS
Status: DISCONTINUED | OUTPATIENT
Start: 2020-05-26 | End: 2020-05-27

## 2020-05-26 RX ADMIN — BRIMONIDINE TARTRATE 1 DROP: 2 SOLUTION OPHTHALMIC at 08:14

## 2020-05-26 RX ADMIN — INSULIN GLARGINE 14 UNITS: 100 INJECTION, SOLUTION SUBCUTANEOUS at 09:27

## 2020-05-26 RX ADMIN — HEPARIN SODIUM 5000 UNITS: 5000 INJECTION INTRAVENOUS; SUBCUTANEOUS at 13:41

## 2020-05-26 RX ADMIN — PRAVASTATIN SODIUM 40 MG: 40 TABLET ORAL at 08:13

## 2020-05-26 RX ADMIN — TRAZODONE HYDROCHLORIDE 50 MG: 50 TABLET ORAL at 22:19

## 2020-05-26 RX ADMIN — AMLODIPINE BESYLATE 10 MG: 5 TABLET ORAL at 08:13

## 2020-05-26 RX ADMIN — CLOPIDOGREL BISULFATE 75 MG: 75 TABLET ORAL at 08:13

## 2020-05-26 RX ADMIN — HEPARIN SODIUM 5000 UNITS: 5000 INJECTION INTRAVENOUS; SUBCUTANEOUS at 06:24

## 2020-05-26 RX ADMIN — INSULIN LISPRO 4 UNITS: 100 INJECTION, SOLUTION INTRAVENOUS; SUBCUTANEOUS at 13:10

## 2020-05-26 RX ADMIN — LEVOTHYROXINE SODIUM 175 MCG: 0.03 TABLET ORAL at 06:25

## 2020-05-26 RX ADMIN — Medication 1 AMPULE: at 22:19

## 2020-05-26 RX ADMIN — METOPROLOL SUCCINATE 25 MG: 25 TABLET, EXTENDED RELEASE ORAL at 08:13

## 2020-05-26 RX ADMIN — INSULIN LISPRO 4 UNITS: 100 INJECTION, SOLUTION INTRAVENOUS; SUBCUTANEOUS at 17:38

## 2020-05-26 RX ADMIN — Medication 1 AMPULE: at 08:14

## 2020-05-26 RX ADMIN — BRIMONIDINE TARTRATE 1 DROP: 2 SOLUTION OPHTHALMIC at 22:19

## 2020-05-26 RX ADMIN — Medication 10 ML: at 22:20

## 2020-05-26 RX ADMIN — Medication 10 ML: at 13:11

## 2020-05-26 NOTE — PROGRESS NOTES
General Daily Progress Note    Admit Date: 5/23/2020    Subjective:     Patient has no complaint----slow and oriented X2. .     Current Facility-Administered Medications   Medication Dose Route Frequency    insulin glargine (LANTUS) injection 14 Units  14 Units SubCUTAneous DAILY    insulin lispro (HUMALOG) injection   SubCUTAneous TIDAC    traZODone (DESYREL) tablet 50 mg  50 mg Oral QHS    glucose chewable tablet 16 g  4 Tab Oral PRN    dextrose (D50W) injection syrg 12.5-25 g  12.5-25 g IntraVENous PRN    glucagon (GLUCAGEN) injection 1 mg  1 mg IntraMUSCular PRN    acetaminophen (TYLENOL) tablet 650 mg  650 mg Oral Q6H PRN    amLODIPine (NORVASC) tablet 10 mg  10 mg Oral DAILY    brimonidine (ALPHAGAN) 0.2 % ophthalmic solution 1 Drop  1 Drop Both Eyes BID    clopidogreL (PLAVIX) tablet 75 mg  75 mg Oral DAILY    levothyroxine (SYNTHROID) tablet 175 mcg  175 mcg Oral 6am    metoprolol succinate (TOPROL-XL) XL tablet 25 mg  25 mg Oral DAILY    pravastatin (PRAVACHOL) tablet 40 mg  40 mg Oral DAILY    sodium chloride (NS) flush 5-40 mL  5-40 mL IntraVENous Q8H    sodium chloride (NS) flush 5-40 mL  5-40 mL IntraVENous PRN    ondansetron (ZOFRAN) injection 4 mg  4 mg IntraVENous Q6H PRN    docusate sodium (COLACE) capsule 100 mg  100 mg Oral DAILY PRN    heparin (porcine) injection 5,000 Units  5,000 Units SubCUTAneous Q8H    alcohol 62% (NOZIN) nasal  1 Ampule  1 Ampule Topical Q12H        Review of Systems  A comprehensive review of systems was negative.     Objective:     Patient Vitals for the past 24 hrs:   BP Temp Pulse Resp SpO2 Weight   05/26/20 0813 156/67  67      05/26/20 0809 153/65 98.3 °F (36.8 °C) 69 17     05/26/20 0700 126/48  60 17     05/26/20 0600 113/46  63 17  145 lb 8.1 oz (66 kg)   05/26/20 0500 107/41  67 18     05/26/20 0400  97.9 °F (36.6 °C) 73 26     05/26/20 0100 128/64  72 18     05/26/20 0000 138/54 98.1 °F (36.7 °C) 75 20 100 %  05/25/20 2300 128/55  72 21     05/25/20 2200 136/62  77 21     05/25/20 2100 143/61  77 21     05/25/20 2000 147/54 97.8 °F (36.6 °C) 76 16 100 %    05/25/20 1600 143/59 98.6 °F (37 °C) 79 20 100 %    05/25/20 1500 149/60  80 19     05/25/20 1400 137/43  79 21     05/25/20 1300 146/63  83 21     05/25/20 1200 129/51 98.9 °F (37.2 °C) 78 18 100 %    05/25/20 1000 (!) 119/93  80 20 100 %    05/25/20 0900   85 18       No intake/output data recorded. 05/24 1901 - 05/26 0700  In: 2332.9 [P.O.:1660; I.V.:672.9]  Out: 1400 [Urine:1400]    Physical Exam:   Visit Vitals  /67   Pulse 67   Temp 98.3 °F (36.8 °C)   Resp 17   Ht 5' 2\" (1.575 m)   Wt 145 lb 8.1 oz (66 kg)   SpO2 100%   BMI 26.61 kg/m²     General appearance: alert, cooperative, no distress, appears stated age, oriented X2  Neck: supple, symmetrical, trachea midline, no adenopathy, thyroid: not enlarged, symmetric, no tenderness/mass/nodules, no carotid bruit and no JVD  Lungs: clear to auscultation bilaterally  Heart: regular rate and rhythm, S1, S2 normal, no murmur, click, rub or gallop  Abdomen: soft, non-tender. Bowel sounds normal. No masses,  no organomegaly  Extremities: extremities normal, atraumatic, no cyanosis or edema    Assessment:     Active Problems:    DKA (diabetic ketoacidoses) ([facility] [address] (6/2/2017)        Plan:     1. Patient admitted for DKA which is now resolved after insulin drip. 2.  Unfortunately her dementia is getting progressively worse. 3.  Medical noncompliance continues because of an inadequate home support network. 4.  Discharge if all goes well.

## 2020-05-26 NOTE — PROGRESS NOTES
Oncology End of Shift Note      Bedside shift change report given to VIN Isbell (incoming nurse) by Jamaal Kay (outgoing nurse) on [facility]. Report included the following information SBAR, Kardex and MAR. Shift Summary: Patient transferred from CCU. Dual skin assessed and Education provided. Patient up with one to chair. No complaints of pain. Medications given and education provided regarding all meds. Hourly rounding completed. Issues for Physician to Address:       Patient on Cardiac Monitoring?     [] Yes  [x] No    Rhythm:         Shift Events        Jamaal Kay

## 2020-05-26 NOTE — PROGRESS NOTES
ALEKSANDAR:   1) Home with SPENSER orders to [facility]   2) Home with f.u appts   3) Pt will need 2ND IM letter at time of discharge   4) Medicaid application- check status   5) May benefit from a palliative consult? Reason for Readmission: Hyperglycemia due to type 1 diabetes mellitus          RUR Score/Risk Level: 40 LEVEL 3 readmission     PCP: First and Last name:  Dr. Jennifer Handy     Name of Practice: [address] [place] [address]    Are you a current patient: Yes/No: Yes    Approximate date of last visit: 4/30/2020. Pt was seen by [facility] services on 5/22/2020. Pt is also a patient of Dr. Nasir De La Vega at [facility]. Pt was last seen on 7/2/2019 and has an upcoming appt on 7/8/2020. Is a Care Conference indicated:  Pt/pt's family would benefit from a Palliative Consult to assist with goals of care. Did you attend your follow up appointment (s): If not, why not: Yes     Resources/supports as identified by patient/family:  Pt's family states they have a very strong family support        Top Challenges facing patient (as identified by patient/family and CM): Finances/Medication cost? No problems affording or accessing medications. Pt's children  her medications as needed. Transportation - Pt's family drives pt as needed. Per pt, her \"one son is special and calls 911 as needed\". Support system or lack thereof? Pt has a strong emotional support system but is not able to have a a strong physical support system. Pt's dtr lives nearby and can assist from a distance with the COVID-19 precautions, and her other son works during the day. Pt's family has been trying to get caregivers but resources and funds are limited. Living arrangements? Lives with her two sons- Ashia Jacques and Ethan Mccain. Ashia Jacques works during the day and pt is with her son Ethan Mccain who has ID and is not able to provide assistance besides minimal cooking and calling 911 if needed. Self-care/ADLs/Cognition?  Alert and oriented to self and location, periods of confusion. Pt needs 24/7 care but family is not able to provide that at this time. Pt states with her sons, one of whom is with her 24/7 but is not able to provide medication properly due to intellectual disabilities. Current Advanced Directive/Advance Care Plan:  None on file. CM spoke with pt regarding this. Pt states she does not have any but needs to do them. CM unsure if pt is able to make those decisions due to back and forth within the notes. CM provided pt with a blank copy and placed a FYI for attending to address with pt if he felt she was able to make those decisions. Plan for utilizing home health:Pt is currently open with [facility] for [facility] RN. Pt will need SPENSER orders at time of discharge. Transition of Care Plan:    Based on readmission, the patient's previous Plan of Care   has been evaluated and/or modified. The current Transition of Care Plan is:          Pt is a 67year old,  female, readmitted with DKA. Pt has had multiple hospitalizations in the last 6 months related to DKA. Pt was alert and oriented to self, location and whom she lived with. Pt lives with her two sons in a tri-level home and was independent for the most part, but needed some assistance with ADLS. Pt's dtr drives her as needed. Prior to admission pt was open with [facility] and was last seen on 5/22/2020. Per [facility] RN it appears that they have been working on the diabetes management and pt has limited support in the home. Pt often forgets to give herself medications due to the worsening dementia. JUNIOR spoke with pt's dtr regarding 24/7 supervision, pt's dtr states she knows this needs to be done and is going to sit down with her brothers. CM noted in the past pt has done a Medicaid application- pt's dtr unsure where they are at in the process. J[facility] see if [facility] could help as pt may need LTC in the near future.      JUNIOR reached out to Milly with [facility] to see if an APS call was ever made due to the frequent hospitalizations, lack of supervision in the home with the increasing dementia and lack of medication management, waiting on a response. CM will continue to follow and assist as needed. Care Management Interventions  PCP Verified by CM:  Yes  Mode of Transport at Discharge: Self  Transition of Care Consult (CM Consult): [address]: Yes  MyChart Signup: No  Discharge Durable Medical Equipment: No  Physical Therapy Consult: Yes  Occupational Therapy Consult: Yes  Current Support Network: Relative's Home, Family Lives Nearby  Confirm Follow Up Transport: Family  The Patient and/or Patient Representative was Provided with a Choice of Provider and Agrees with the Discharge Plan?: Yes  Freedom of Choice List was Provided with Basic Dialogue that Supports the Patient's Individualized Plan of Care/Goals, Treatment Preferences and Shares the Quality Data Associated with the Providers?: Yes  Discharge Location  Discharge Placement: Home with home health     ROSA Gan, [address] [facility]   [phone]

## 2020-05-26 NOTE — PROGRESS NOTES
5/26/2020    INTENSIVIST PROGRESS NOTE:     Patient seen and evaluated, chart reviewed. Admitted 5-23-20 with DKA. Started on insulin drip. AG now closed and off insulin drip. She is awake and eating well with increasing blood sugars. 5/26 waiting for transfer. Poor insight into illness. Lives with son, daughter. Reports that she stewart snot eat? Depressed? ? Visit Vitals  /48   Pulse 60   Temp 97.9 °F (36.6 °C)   Resp 17   Ht 5' 2\" (1.575 m)   Wt 66 kg (145 lb 8.1 oz)   SpO2 100%   BMI 26.61 kg/m²       General: NAD petite AAF  Eyes: anicteric  HEENT: NC/AT  CV: RRR  Lungs: CTA B  Abd: soft, +BS  Ext: no cyanosis, no clubbing  Skin: warm and dry  Musculoskeletal: no gross deformities  Neuro: no localizing findings    Labs reviewed    A/P:  - DM1 with DKA - adjust insulin  - Depression? Non compliance? ?- would consider antidepressant   - ONEYDA - resolved  - replete electrolytes as needed  - HTN  - h/o stroke  - hypothyroid   - DVT prophylaxis  - transfer to floor    Kindra Grimm MD

## 2020-05-26 NOTE — PROGRESS NOTES
SPEECH THERAPY SCREENING:  SERVICES may be INDICATED   She failed the STAND due to being lethargic. Please repeat the STAND once she is alert. If she needs a speech consult please consider.      Linette Ospina, SLP

## 2020-05-27 ENCOUNTER — HOME CARE VISIT (OUTPATIENT)
Dept: HOME HEALTH SERVICES | Facility: HOME HEALTH | Age: 80
End: 2020-05-27
Payer: MEDICARE

## 2020-05-27 LAB
ANION GAP SERPL CALC-SCNC: 4 MMOL/L (ref 5–15)
BUN SERPL-MCNC: 12 MG/DL (ref 6–20)
BUN/CREAT SERPL: 19 (ref 12–20)
CALCIUM SERPL-MCNC: 7.9 MG/DL (ref 8.5–10.1)
CHLORIDE SERPL-SCNC: 106 MMOL/L (ref 97–108)
CO2 SERPL-SCNC: 26 MMOL/L (ref 21–32)
CREAT SERPL-MCNC: 0.63 MG/DL (ref 0.55–1.02)
GLUCOSE BLD STRIP.AUTO-MCNC: 122 MG/DL (ref 65–100)
GLUCOSE BLD STRIP.AUTO-MCNC: 167 MG/DL (ref 65–100)
GLUCOSE BLD STRIP.AUTO-MCNC: 192 MG/DL (ref 65–100)
GLUCOSE BLD STRIP.AUTO-MCNC: 330 MG/DL (ref 65–100)
GLUCOSE SERPL-MCNC: 95 MG/DL (ref 65–100)
POTASSIUM SERPL-SCNC: 4 MMOL/L (ref 3.5–5.1)
SERVICE CMNT-IMP: ABNORMAL
SODIUM SERPL-SCNC: 136 MMOL/L (ref 136–145)

## 2020-05-27 PROCEDURE — 94760 N-INVAS EAR/PLS OXIMETRY 1: CPT

## 2020-05-27 PROCEDURE — 74011250637 HC RX REV CODE- 250/637: Performed by: INTERNAL MEDICINE

## 2020-05-27 PROCEDURE — 3331090002 HH PPS REVENUE DEBIT

## 2020-05-27 PROCEDURE — 3331090001 HH PPS REVENUE CREDIT

## 2020-05-27 PROCEDURE — 74011250636 HC RX REV CODE- 250/636: Performed by: INTERNAL MEDICINE

## 2020-05-27 PROCEDURE — 74011636637 HC RX REV CODE- 636/637: Performed by: INTERNAL MEDICINE

## 2020-05-27 PROCEDURE — 82962 GLUCOSE BLOOD TEST: CPT

## 2020-05-27 PROCEDURE — 80048 BASIC METABOLIC PNL TOTAL CA: CPT

## 2020-05-27 PROCEDURE — 74011250637 HC RX REV CODE- 250/637: Performed by: NURSE PRACTITIONER

## 2020-05-27 PROCEDURE — 77030038269 HC DRN EXT URIN PURWCK BARD -A

## 2020-05-27 PROCEDURE — 36415 COLL VENOUS BLD VENIPUNCTURE: CPT

## 2020-05-27 PROCEDURE — 65270000015 HC RM PRIVATE ONCOLOGY

## 2020-05-27 RX ORDER — INSULIN GLARGINE 100 [IU]/ML
18 INJECTION, SOLUTION SUBCUTANEOUS DAILY
Status: DISCONTINUED | OUTPATIENT
Start: 2020-05-27 | End: 2020-05-28 | Stop reason: HOSPADM

## 2020-05-27 RX ORDER — INSULIN LISPRO 100 [IU]/ML
4 INJECTION, SOLUTION INTRAVENOUS; SUBCUTANEOUS
Status: DISCONTINUED | OUTPATIENT
Start: 2020-05-27 | End: 2020-05-28 | Stop reason: HOSPADM

## 2020-05-27 RX ADMIN — LEVOTHYROXINE SODIUM 175 MCG: 0.03 TABLET ORAL at 06:16

## 2020-05-27 RX ADMIN — Medication 10 ML: at 15:12

## 2020-05-27 RX ADMIN — TRAZODONE HYDROCHLORIDE 50 MG: 50 TABLET ORAL at 21:40

## 2020-05-27 RX ADMIN — PRAVASTATIN SODIUM 40 MG: 40 TABLET ORAL at 08:22

## 2020-05-27 RX ADMIN — METOPROLOL SUCCINATE 25 MG: 25 TABLET, EXTENDED RELEASE ORAL at 08:22

## 2020-05-27 RX ADMIN — BRIMONIDINE TARTRATE 1 DROP: 2 SOLUTION OPHTHALMIC at 18:03

## 2020-05-27 RX ADMIN — AMLODIPINE BESYLATE 10 MG: 5 TABLET ORAL at 08:22

## 2020-05-27 RX ADMIN — Medication 10 ML: at 06:00

## 2020-05-27 RX ADMIN — INSULIN GLARGINE 18 UNITS: 100 INJECTION, SOLUTION SUBCUTANEOUS at 08:24

## 2020-05-27 RX ADMIN — Medication 10 ML: at 21:40

## 2020-05-27 RX ADMIN — Medication 1 AMPULE: at 09:10

## 2020-05-27 RX ADMIN — HEPARIN SODIUM 5000 UNITS: 5000 INJECTION INTRAVENOUS; SUBCUTANEOUS at 21:39

## 2020-05-27 RX ADMIN — INSULIN LISPRO 4 UNITS: 100 INJECTION, SOLUTION INTRAVENOUS; SUBCUTANEOUS at 12:47

## 2020-05-27 RX ADMIN — BRIMONIDINE TARTRATE 1 DROP: 2 SOLUTION OPHTHALMIC at 09:10

## 2020-05-27 RX ADMIN — Medication 1 AMPULE: at 21:39

## 2020-05-27 RX ADMIN — CLOPIDOGREL BISULFATE 75 MG: 75 TABLET ORAL at 08:22

## 2020-05-27 NOTE — HOME CARE
Please note that this patient was open to [facility] services at the time of hospital admission. If services will be needed at discharge, please order to resume them.  Epifanio Avilez RN, [phone]

## 2020-05-27 NOTE — PROGRESS NOTES
Oncology End of Shift Note      Bedside shift change report given to Janet Yost RN (incoming nurse) by Shahnaz Hill (outgoing nurse) on [facility]. Report included the following information SBAR, Kardex, Intake/Output, MAR, Accordion and Recent Results. Shift Summary: no acute changes; pt transferred to chair for breakfast with assistance and refused to get out of bed for other meals; denies pain; purewick remains in place;        Issues for Physician to Address:  Please clarify/adjust Humalog order ( sliding scale vs. Standing 4 units)      Patient on Cardiac Monitoring?     [] Yes  [x] No    Rhythm:          Shift Events        Shahnaz Hill

## 2020-05-27 NOTE — PROGRESS NOTES
Oncology End of Shift Note      Bedside shift change report given to Marina Hurtado RN (incoming nurse) by Heaven Ray (outgoing nurse) on [facility]. Report included the following information SBAR, Kardex and MAR. Shift Summary:   Patient did not complain of any pain during my shift. Patient was up with the assistance of one to the bathroom. Heparin was held due to platelet being 629, 198, see MAR. All other scheduled medications have been given, see MAR. Patient teaching and routine rounding has been done. IV was flushed and patent. Issues for Physician to Address:       Patient on Cardiac Monitoring?     [] Yes  [x] No    Rhythm:          Shift Events        Heaven Ray

## 2020-05-27 NOTE — PROGRESS NOTES
RAPID RESPONSE TEAM- Follow Up    Rounded on patient due to recent transfer out of CCU. Discussed with primary RNAkilah.  No acute concerns, VSS, MEWS 1. Patient Vitals for the past 12 hrs:   Temp Pulse Resp BP SpO2   05/26/20 2317 98.2 °F (36.8 °C) 65 20 114/55 100 %   05/26/20 1930   20     05/26/20 1524 97.8 °F (36.6 °C) 72 20 130/58 100 %     Glucose (POC)   Date Value Ref Range Status   05/26/2020 175 (H) 65 - 100 mg/dL Final     Comment:     (NOTE)  The Accu-Chek Inform II glucometer is not FDA cleared for critically   ill patient use. A study was performed validating the equivalence of   glucometer and clinical laboratory results on this patient   population. Despite the study, use of glucometers with capillary   specimens from critically ill patients, regardless of their location,   makes the test high complexity and requires the performing individual   to comply with CLIA requirements more stringent than those for waived   testing in the hospital setting. Critical thinking skills are   necessary to determine a potentially critically ill patients status   prior to using a glucometer. No RRT interventions indicated at this time. Please call with any questions or concerns.      Milka Desai  Rapid Response [facility]

## 2020-05-27 NOTE — INTERDISCIPLINARY ROUNDS
Oncology Interdisciplinary rounds were held today to discuss patient plan of care and outcomes. The following members were present: Nursing, Physician, Case Management, Pharmacy, and PT/OT Actual Length of Stay: 4 DRG GLOS: 2.9 Expected Length of Stay: 2d 21h Plan            Discharge Palliative care consult? ?? 
 DATE:5/28/2020. Needs 24/7 supervision.

## 2020-05-27 NOTE — PROGRESS NOTES
General Daily Progress Note    Admit Date: 5/23/2020    Subjective:     Patient has no complaint. .     Current Facility-Administered Medications   Medication Dose Route Frequency    insulin lispro (HUMALOG) injection 4 Units  4 Units SubCUTAneous TIDAC    insulin glargine (LANTUS) injection 18 Units  18 Units SubCUTAneous DAILY    traZODone (DESYREL) tablet 50 mg  50 mg Oral QHS    glucose chewable tablet 16 g  4 Tab Oral PRN    dextrose (D50W) injection syrg 12.5-25 g  12.5-25 g IntraVENous PRN    glucagon (GLUCAGEN) injection 1 mg  1 mg IntraMUSCular PRN    acetaminophen (TYLENOL) tablet 650 mg  650 mg Oral Q6H PRN    amLODIPine (NORVASC) tablet 10 mg  10 mg Oral DAILY    brimonidine (ALPHAGAN) 0.2 % ophthalmic solution 1 Drop  1 Drop Both Eyes BID    clopidogreL (PLAVIX) tablet 75 mg  75 mg Oral DAILY    levothyroxine (SYNTHROID) tablet 175 mcg  175 mcg Oral 6am    metoprolol succinate (TOPROL-XL) XL tablet 25 mg  25 mg Oral DAILY    pravastatin (PRAVACHOL) tablet 40 mg  40 mg Oral DAILY    sodium chloride (NS) flush 5-40 mL  5-40 mL IntraVENous Q8H    sodium chloride (NS) flush 5-40 mL  5-40 mL IntraVENous PRN    ondansetron (ZOFRAN) injection 4 mg  4 mg IntraVENous Q6H PRN    docusate sodium (COLACE) capsule 100 mg  100 mg Oral DAILY PRN    heparin (porcine) injection 5,000 Units  5,000 Units SubCUTAneous Q8H    alcohol 62% (NOZIN) nasal  1 Ampule  1 Ampule Topical Q12H        Review of Systems  A comprehensive review of systems was negative. Objective:     Patient Vitals for the past 24 hrs:   BP Temp Pulse Resp SpO2   05/27/20 0749 150/62 98.1 °F (36.7 °C) 85 20 100 %   05/26/20 2317 114/55 98.2 °F (36.8 °C) 65 20 100 %   05/26/20 1930    20    05/26/20 1524 130/58 97.8 °F (36.6 °C) 72 20 100 %   05/26/20 1033 128/57 98.2 °F (36.8 °C) 65 20 100 %     No intake/output data recorded.   05/25 1901 - 05/27 0700  In: 200 [P.O.:200]  Out: 1400 [Urine:1400]    Physical Exam:   Visit Vitals  /62 (BP 1 Location: Right arm, BP Patient Position: At rest)   Pulse 85   Temp 98.1 °F (36.7 °C)   Resp 20   Ht 5' 2\" (1.575 m)   Wt 145 lb 8.1 oz (66 kg)   SpO2 100%   BMI 26.61 kg/m²     General appearance: alert, cooperative, no distress, appears stated age  Neck: supple, symmetrical, trachea midline, no adenopathy, thyroid: not enlarged, symmetric, no tenderness/mass/nodules, no carotid bruit and no JVD  Lungs: clear to auscultation bilaterally  Heart: regular rate and rhythm, S1, S2 normal, no murmur, click, rub or gallop  Abdomen: soft, non-tender. Bowel sounds normal. No masses,  no organomegaly  Extremities: extremities normal, atraumatic, no cyanosis or edema    Assessment:     Active Problems:    DKA (diabetic ketoacidoses) ([facility] [address] (6/2/2017)        Plan:     1. Continue diabetic control. DKA resolved. 2.  Unfortunately dementia is getting progressively worse. 3.  Discharge tomorrow if all goes well.

## 2020-05-27 NOTE — PROGRESS NOTES
ALEKSANDAR:   1) Home with new orders to [facility] (RN and SW)   2) Home with f.u appts   3) Pt will need 2ND IM letter at time of discharge   4) Medicaid application- check status   5) Pt may benefit from a palliative consult     4:44 PM- JUNIOR spoke with Milly with [facility]- pt's RN did make an APS call regarding pt's safety in the home. JUNIOR spoke with RN Lead- it does not appear that attending completed AMD's with pt. JUNIOR spoke with RN Lead and RN Director who will speak with attending in the morning regarding a palliative consult and or AMDs. CM will continue to follow and assist as needed.      Pavel Jiménez, MSW, [address]   [phone]

## 2020-05-28 VITALS
BODY MASS INDEX: 26.78 KG/M2 | OXYGEN SATURATION: 100 % | WEIGHT: 145.5 LBS | DIASTOLIC BLOOD PRESSURE: 53 MMHG | SYSTOLIC BLOOD PRESSURE: 136 MMHG | HEIGHT: 62 IN | RESPIRATION RATE: 19 BRPM | TEMPERATURE: 98 F | HEART RATE: 66 BPM

## 2020-05-28 LAB
GLUCOSE BLD STRIP.AUTO-MCNC: 266 MG/DL (ref 65–100)
SERVICE CMNT-IMP: ABNORMAL

## 2020-05-28 PROCEDURE — 82962 GLUCOSE BLOOD TEST: CPT

## 2020-05-28 PROCEDURE — 3331090002 HH PPS REVENUE DEBIT

## 2020-05-28 PROCEDURE — 94760 N-INVAS EAR/PLS OXIMETRY 1: CPT

## 2020-05-28 PROCEDURE — 77030038269 HC DRN EXT URIN PURWCK BARD -A

## 2020-05-28 PROCEDURE — 3331090001 HH PPS REVENUE CREDIT

## 2020-05-28 PROCEDURE — 74011636637 HC RX REV CODE- 636/637: Performed by: INTERNAL MEDICINE

## 2020-05-28 PROCEDURE — 74011250636 HC RX REV CODE- 250/636: Performed by: INTERNAL MEDICINE

## 2020-05-28 PROCEDURE — 74011250637 HC RX REV CODE- 250/637: Performed by: INTERNAL MEDICINE

## 2020-05-28 RX ADMIN — BRIMONIDINE TARTRATE 1 DROP: 2 SOLUTION OPHTHALMIC at 09:44

## 2020-05-28 RX ADMIN — METOPROLOL SUCCINATE 25 MG: 25 TABLET, EXTENDED RELEASE ORAL at 09:40

## 2020-05-28 RX ADMIN — CLOPIDOGREL BISULFATE 75 MG: 75 TABLET ORAL at 09:40

## 2020-05-28 RX ADMIN — INSULIN LISPRO 4 UNITS: 100 INJECTION, SOLUTION INTRAVENOUS; SUBCUTANEOUS at 09:41

## 2020-05-28 RX ADMIN — INSULIN GLARGINE 18 UNITS: 100 INJECTION, SOLUTION SUBCUTANEOUS at 09:41

## 2020-05-28 RX ADMIN — AMLODIPINE BESYLATE 10 MG: 5 TABLET ORAL at 09:40

## 2020-05-28 RX ADMIN — PRAVASTATIN SODIUM 40 MG: 40 TABLET ORAL at 09:40

## 2020-05-28 RX ADMIN — LEVOTHYROXINE SODIUM 175 MCG: 0.03 TABLET ORAL at 06:52

## 2020-05-28 RX ADMIN — HEPARIN SODIUM 5000 UNITS: 5000 INJECTION INTRAVENOUS; SUBCUTANEOUS at 06:52

## 2020-05-28 RX ADMIN — Medication 10 ML: at 06:52

## 2020-05-28 NOTE — PROGRESS NOTES
Oncology End of Shift Note      Bedside shift change report given to Fely Campbell RN (incoming nurse) by Madhavi Guerra (outgoing nurse) on [facility]. Report included the following information SBAR, Kardex, Intake/Output and MAR. Shift Summary: Pt remained stable during shift. No complaints through night. No labs ordered. Pt possible D/C. Issues for Physician to Address:  D/C? Patient on Cardiac Monitoring?     [] Yes  [x] No    Rhythm:          Madhavi Guerra

## 2020-05-28 NOTE — INTERDISCIPLINARY ROUNDS
Oncology Interdisciplinary rounds were held today to discuss patient plan of care and outcomes. The following members were present: Nursing, Physician, Case Management, Pharmacy, and PT/OT Actual Length of Stay: 5 DRG GLOS: 2.9 Expected Length of Stay: 2d 21h Plan            Discharge Discharge 5/28/20. 
24/7 supervision. Home with [facility] and [facility].

## 2020-05-28 NOTE — PROGRESS NOTES
I have reviewed discharge instructions with the PATIENT PARENT GUARDIAN: patient. The patient verbalized understanding. Discharge medications reviewed with patient and appropriate educational materials and side effects teaching were provided. Follow-up appointments reviewed. Opportunity for questions and clarification was provided. Venous access removed without difficulty. Patient's belongings gathered and sent with patient. Patient is ready for discharge.      Yvonne Alegria

## 2020-05-28 NOTE — PROGRESS NOTES
Problem: Breathing Pattern - Ineffective  Goal: *Absence of hypoxia  Outcome: Resolved/Met  Goal: *Use of effective breathing techniques  Outcome: Resolved/Met     Problem: Patient Education: Go to Patient Education Activity  Goal: Patient/Family Education  Outcome: Resolved/Met     Problem: Patient Education: Go to Patient Education Activity  Goal: Patient/Family Education  Outcome: Resolved/Met     Problem: DKA: Day 1  Goal: Off Pathway (Use only if patient is Off Pathway)  Outcome: Resolved/Met  Goal: Activity/Safety  Outcome: Resolved/Met  Goal: Consults, if ordered  Outcome: Resolved/Met  Goal: Diagnostic Tests/Procedures, if Ordered  Outcome: Resolved/Met  Goal: Nutrition/Diet  Outcome: Resolved/Met  Goal: Discharge Planning  Outcome: Resolved/Met  Goal: Medications  Outcome: Resolved/Met  Goal: Respiratory  Outcome: Resolved/Met  Goal: Treatments/Interventions/Procedures  Outcome: Resolved/Met  Goal: Psychosocial  Outcome: Resolved/Met  Goal: *Hemodynamically stable  Outcome: Resolved/Met  Goal: *Blood glucose falling 50 to 100 mg/dl/hr  Outcome: Resolved/Met  Goal: *Potassium normalizing  Outcome: Resolved/Met     Problem: DKA: Day 2  Goal: Off Pathway (Use only if patient is Off Pathway)  Outcome: Resolved/Met  Goal: Activity/Safety  Outcome: Resolved/Met  Goal: Consults, if ordered  Outcome: Resolved/Met  Goal: Diagnostic Test/Procedures  Outcome: Resolved/Met  Goal: Nutrition/Diet  Outcome: Resolved/Met  Goal: Discharge Planning  Outcome: Resolved/Met  Goal: Medications  Outcome: Resolved/Met  Goal: Respiratory  Outcome: Resolved/Met  Goal: Treatments/Interventions/Procedures  Outcome: Resolved/Met  Goal: Psychosocial  Outcome: Resolved/Met  Goal: *Acidosis resolved  Outcome: Resolved/Met  Goal: *Tolerating diet  Outcome: Resolved/Met  Goal: *Demonstrates progressive activity  Outcome: Resolved/Met  Goal: *Blood glucose 80 to 180 mg/dl  Outcome: Resolved/Met     Problem: DKA: Day 3  Goal: Off Pathway (Use only if patient is Off Pathway)  Outcome: Resolved/Met  Goal: Activity/Safety  Outcome: Resolved/Met  Goal: Diagnostic Test/Procedures  Outcome: Resolved/Met  Goal: Nutrition/Diet  Outcome: Resolved/Met  Goal: Discharge Planning  Outcome: Resolved/Met  Goal: Medications  Outcome: Resolved/Met  Goal: Treatments/Interventions/Procedures  Outcome: Resolved/Met  Goal: Psychosocial  Outcome: Resolved/Met     Problem: DKA: Discharge Outcomes  Goal: *Ambulates and performs ADL's  Outcome: Resolved/Met  Goal: *Describes follow-up/return visits to physicians, diabetes treatment coordinator and other resources  Outcome: Resolved/Met  Goal: *Blood glucose at patient's target range  Outcome: Resolved/Met  Goal: *Acidosis resolved  Outcome: Resolved/Met  Goal: *Tolerating diet  Outcome: Resolved/Met  Goal: *Verbalizes understanding and describes prescribed diet  Outcome: Resolved/Met  Goal: *Describes blood glucose goals, monitoring, sick day rules, hypo/hyperglycemia  Outcome: Resolved/Met  Goal: *Describes available resources and support systems  Outcome: Resolved/Met  Goal: *Verbalizes name, dosage, time, side effects, and number of days to continue medications  Outcome: Resolved/Met  Goal: *Demonstrates ability to self-administer insulin  Outcome: Resolved/Met     Problem: Pressure Injury - Risk of  Goal: *Prevention of pressure injury  Description: Document Madhu Scale and appropriate interventions in the flowsheet.   Outcome: Resolved/Met  Note: Pressure Injury Interventions:  Sensory Interventions: Assess changes in LOC, Assess need for specialty bed, Keep linens dry and wrinkle-free, Minimize linen layers    Moisture Interventions: Absorbent underpads, Internal/External urinary devices    Activity Interventions: Increase time out of bed    Mobility Interventions: HOB 30 degrees or less    Nutrition Interventions: Document food/fluid/supplement intake    Friction and Shear Interventions: HOB 30 degrees or less Problem: Patient Education: Go to Patient Education Activity  Goal: Patient/Family Education  Outcome: Resolved/Met     Problem: Diabetes Self-Management  Goal: *Disease process and treatment process  Description: Define diabetes and identify own type of diabetes; list 3 options for treating diabetes. Outcome: Resolved/Met  Goal: *Incorporating nutritional management into lifestyle  Description: Describe effect of type, amount and timing of food on blood glucose; list 3 methods for planning meals. Outcome: Resolved/Met  Goal: *Incorporating physical activity into lifestyle  Description: State effect of exercise on blood glucose levels. Outcome: Resolved/Met  Goal: *Developing strategies to promote health/change behavior  Description: Define the ABC's of diabetes; identify appropriate screenings, schedule and personal plan for screenings. Outcome: Resolved/Met  Goal: *Using medications safely  Description: State effect of diabetes medications on diabetes; name diabetes medication taking, action and side effects. Outcome: Resolved/Met  Goal: *Monitoring blood glucose, interpreting and using results  Description: Identify recommended blood glucose targets  and personal targets. Outcome: Resolved/Met  Goal: *Prevention, detection, treatment of acute complications  Description: List symptoms of hyper- and hypoglycemia; describe how to treat low blood sugar and actions for lowering  high blood glucose level. Outcome: Resolved/Met  Goal: *Prevention, detection and treatment of chronic complications  Description: Define the natural course of diabetes and describe the relationship of blood glucose levels to long term complications of diabetes.   Outcome: Resolved/Met  Goal: *Developing strategies to address psychosocial issues  Description: Describe feelings about living with diabetes; identify support needed and support network  Outcome: Resolved/Met  Goal: *Insulin pump training  Outcome: Resolved/Met  Goal: *Sick day guidelines  Outcome: Resolved/Met  Goal: *Patient Specific Goal (EDIT GOAL, INSERT TEXT)  Outcome: Resolved/Met     Problem: Patient Education: Go to Patient Education Activity  Goal: Patient/Family Education  Outcome: Resolved/Met

## 2020-05-28 NOTE — PROGRESS NOTES
ALEKSANDAR:   1) Home with [address] (RN and SW)   2) Home with f.u appts   3) Home with [facility] flyer and information     D/C Order Noted-   8:53 AM- CM reviewed chart- no AMD completed this morning- pt likely not able to complete them. Pt will go home with [address] (RN and SW) and f.u appts. CM spoke with pt's dtr regarding aleksandar. Pt's dtr states her brother will come to get pt. Pt's dtr stated she plans to have pt go home with the supervision of her other brother (with ID). CM provided pt's dtr with a private duty caregiver list to [email or web address]. Pt's dtr contacting her brother to coordinate a time with the RN to pick pt up. Medicare pt has received, reviewed, and signed 2nd IM letter informing them of their right to appeal the discharge. Signed copy has been placed on pt bedside chart. Care Management Interventions  PCP Verified by CM:  Yes  Mode of Transport at Discharge: Self  Transition of Care Consult (CM Consult): Discharge Planning, [address]: Yes  MyChart Signup: No  Discharge Durable Medical Equipment: No  Physical Therapy Consult: Yes  Occupational Therapy Consult: Yes  Speech Therapy Consult: No  Current Support Network: Relative's Home, Family Lives [place], Lives with Caregiver  Confirm Follow Up Transport: Family  The Patient and/or Patient Representative was Provided with a Choice of Provider and Agrees with the Discharge Plan?: Yes  Name of the Patient Representative Who was Provided with a Choice of Provider and Agrees with the Discharge Plan: Spoke with pt's dtr due to pt having worsening dementia   Freedom of Choice List was Provided with Basic Dialogue that Supports the Patient's Individualized Plan of Care/Goals, Treatment Preferences and Shares the Quality Data Associated with the Providers?: Yes  Discharge Location  Discharge Placement: Home with home health(APS also involved )     ROSA Templeton, [address] [facility]   [phone]

## 2020-05-28 NOTE — ROUTINE PROCESS
The following appointments have been successfully scheduled: 
 
Date/time Monday, June 01, 2020 01:15 PM 
Patient  Easton Crocker 1940 ([id number]) #[id number] #[id number] [facility] OFFICE-PCP Appointment type Transitional Care Provider Bhargavi Shah

## 2020-05-29 ENCOUNTER — PATIENT OUTREACH (OUTPATIENT)
Dept: INTERNAL MEDICINE CLINIC | Age: 80
End: 2020-05-29

## 2020-05-29 PROCEDURE — 3331090001 HH PPS REVENUE CREDIT

## 2020-05-29 PROCEDURE — 3331090002 HH PPS REVENUE DEBIT

## 2020-05-29 NOTE — DISCHARGE SUMMARY
[address] SUMMARY    Name:  Alissa Guido  MR#:  [id number]  :  1940  ACCOUNT #:  [de-identified]  ADMIT DATE:  2020  DISCHARGE DATE:  2020      HISTORY OF PRESENT ILLNESS:  The patient is a 64-year-old debilitated lady, presented to the emergency room because of increasing confusion, and weakness and was found to have diabetic ketoacidosis. Her diabetic control has been extremely poor because of lack of support at home to carry out the diabetic regimen. She does not even come in for followup unfortunately, on a consistent basis. She was evaluated and her potassium was 6.9 with CO2 of less than 5, blood sugar 1062, BUN and creatinine 37 and 1.77 respectively. She, therefore was subsequently admitted for further treatment. This has been a recurring problem. Past medical history, social history, review of systems, family history, physical examination are as in admitting H and P.    LABORATORY VALUES:  Serum sodium is 126, potassium 6.9, CO2 less than 5. Blood sugar 1062. BUN and creatinine 37 and 1.77 respectively with an AST of 64 and alkaline phosphatase of 185. Hemoglobin A1c was strikingly 11.8. CO2 remained low up until  about 18 hours after admission and it increased to 20 and has remained over 26. At the time of discharge, BUN and creatinine were 12 and 0.63 respectively. Urinalysis revealed greater than 1000 mg/dL of glycosuria. Hemoglobin was 11.0, white count 11.8, MCV was 105.5, platelet count of 285. At the time of discharge, platelet count was 930 with a white count of 5.1, hemoglobin of 10.5. No radiographs were done. HOSPITAL COURSE:  The patient was admitted, placed in the intensive care unit, where she was placed on an insulin drip and parenteral fluids. With this, her ketoacidosis improved significantly with resolution of the acidotic process with CO2 increasing well over 20.   She was transitioned off of the insulin drip to a basal bolus dose of insulin and she continued to improve. Concomitant hydration occurred with normalization of her renal function. This was exclusively prerenal.    Her blood sugars improved and remained reasonable, she was subsequently discharged home. She did not have an inciting event other than her chronic history of noncompliance. FINAL DIAGNOSES:  1. Diabetic ketoacidosis. 2.  Diabetes mellitus type 1B. 3.  Dehydration. 4.  Prerenal azotemia. 5.  Progressive dementia. 6.  Primary hypertension. 7.  Dyslipidemia. 8.  Hypothyroidism. 9.  History of diastolic dysfunction, not currently evident on this admission. DISPOSITION:  1. The patient will be discharged home ambulatory on an ADA diet with bedtime snack. I emphasized timing of her eating. 2.  Blood sugars will be checked at a.c and at bedtime. 3.  She remains a full code. 4.  Discharge medications include the following:  NovoLog 4 units a.c. meals, Tresiba 18 units q.a.m., amlodipine 10 mg daily, Alphagan 0.15% one drop both eyes b.i.d., clopidogrel 75 mg daily, Levoxyl 0.175 mg daily, metoprolol succinate 25 mg daily, pravastatin 80 mg at bedtime. 5.  The patient will return to the office in the next 3-5 days. 6.  I again admonished the family to improve supervision since the patient is totally incapable of following any of her diabetic instructions.         MD KENNETH [email or web address]  D:  05/28/2020 8:30  T:  05/29/2020 1:58  JOB #:  [id number]

## 2020-05-29 NOTE — Clinical Note
Unable to reach patient by phone for transitions of care and no callback from patient and/or emergency contacts on [address] form.

## 2020-05-29 NOTE — PROGRESS NOTES
5-29-20 at 12:31pm: Care Transitions Nurse left two messages for patient. CTN met with Yocasta Acosta (on [address]) briefly via phone; however Ms. Royal states she is not able to have a conversation at this time. CTN left message for Sedrick Park (on [address]). 6-1-20 at 9:59am: Care Transitions Nurse has not received call back from messages left for patient and Sedrick Bunde on 5-29-20. CTN met briefly with patient, via phone; however patient states this is not a good time for conversation. Patient asked that this CTN call her at a later date. CTN will attempt to reach patient in the near future. 6-2-20 at 10:10am: Care Transitions Nurse met briefly with patient, via phone; however patient states this is not a good time for conversation and patient asked that this CTN call back at a later date. CTN will attempt to reach patient in the near future. 6-4-20 at 1:37pm: Care Transitions Nurse left two messages for patient today. CTN has not received a callback from previous messages left for patient, Yocasta Danielss (on [facility]), and/or Sedrick Bunde (on [address]). Today's attempt to reach patient is fourth attempt. CTN will await callback from patient regarding two messages left for her today. 6-4-20 at 4:21pm:  Care Transitions Nurse has not received call back from patient, Yocasta Acosta, and/or Sedrick Bunde. Four attempts were made to reach patient and CTN spoke with patient briefly on two occasions. CTN spoke with Yocasta Acosta on one occasion. Episode of care closed and no further outreach is planned at this time.

## 2020-05-30 ENCOUNTER — HOME CARE VISIT (OUTPATIENT)
Dept: HOME HEALTH SERVICES | Facility: HOME HEALTH | Age: 80
End: 2020-05-30
Payer: MEDICARE

## 2020-05-30 ENCOUNTER — HOME CARE VISIT (OUTPATIENT)
Dept: SCHEDULING | Facility: HOME HEALTH | Age: 80
End: 2020-05-30
Payer: MEDICARE

## 2020-05-30 VITALS
HEART RATE: 64 BPM | SYSTOLIC BLOOD PRESSURE: 128 MMHG | TEMPERATURE: 98.1 F | DIASTOLIC BLOOD PRESSURE: 68 MMHG | OXYGEN SATURATION: 99 % | RESPIRATION RATE: 16 BRPM

## 2020-05-30 PROCEDURE — 3331090001 HH PPS REVENUE CREDIT

## 2020-05-30 PROCEDURE — 3331090002 HH PPS REVENUE DEBIT

## 2020-05-30 PROCEDURE — G0299 HHS/HOSPICE OF RN EA 15 MIN: HCPCS

## 2020-05-30 PROCEDURE — 400013 HH SOC

## 2020-05-31 PROCEDURE — 3331090001 HH PPS REVENUE CREDIT

## 2020-05-31 PROCEDURE — 3331090002 HH PPS REVENUE DEBIT

## 2020-06-01 PROCEDURE — 3331090001 HH PPS REVENUE CREDIT

## 2020-06-01 PROCEDURE — 3331090002 HH PPS REVENUE DEBIT

## 2020-06-02 PROCEDURE — 3331090002 HH PPS REVENUE DEBIT

## 2020-06-02 PROCEDURE — 3331090001 HH PPS REVENUE CREDIT

## 2020-06-03 ENCOUNTER — HOME CARE VISIT (OUTPATIENT)
Dept: SCHEDULING | Facility: HOME HEALTH | Age: 80
End: 2020-06-03
Payer: MEDICARE

## 2020-06-03 VITALS
HEART RATE: 77 BPM | TEMPERATURE: 99.3 F | RESPIRATION RATE: 16 BRPM | DIASTOLIC BLOOD PRESSURE: 60 MMHG | OXYGEN SATURATION: 98 % | SYSTOLIC BLOOD PRESSURE: 130 MMHG

## 2020-06-03 PROCEDURE — 3331090002 HH PPS REVENUE DEBIT

## 2020-06-03 PROCEDURE — G0299 HHS/HOSPICE OF RN EA 15 MIN: HCPCS

## 2020-06-03 PROCEDURE — 3331090001 HH PPS REVENUE CREDIT

## 2020-06-04 ENCOUNTER — HOME CARE VISIT (OUTPATIENT)
Dept: HOME HEALTH SERVICES | Facility: HOME HEALTH | Age: 80
End: 2020-06-04
Payer: MEDICARE

## 2020-06-04 PROCEDURE — 3331090002 HH PPS REVENUE DEBIT

## 2020-06-04 PROCEDURE — 3331090001 HH PPS REVENUE CREDIT

## 2020-06-05 ENCOUNTER — HOME CARE VISIT (OUTPATIENT)
Dept: SCHEDULING | Facility: HOME HEALTH | Age: 80
End: 2020-06-05
Payer: MEDICARE

## 2020-06-05 PROCEDURE — 3331090002 HH PPS REVENUE DEBIT

## 2020-06-05 PROCEDURE — 3331090001 HH PPS REVENUE CREDIT

## 2020-06-06 PROCEDURE — 3331090002 HH PPS REVENUE DEBIT

## 2020-06-06 PROCEDURE — 3331090001 HH PPS REVENUE CREDIT

## 2020-06-07 PROCEDURE — 3331090002 HH PPS REVENUE DEBIT

## 2020-06-07 PROCEDURE — 3331090001 HH PPS REVENUE CREDIT

## 2020-06-08 PROCEDURE — 3331090001 HH PPS REVENUE CREDIT

## 2020-06-08 PROCEDURE — 3331090002 HH PPS REVENUE DEBIT

## 2020-06-09 ENCOUNTER — HOME CARE VISIT (OUTPATIENT)
Dept: SCHEDULING | Facility: HOME HEALTH | Age: 80
End: 2020-06-09
Payer: MEDICARE

## 2020-06-09 VITALS
HEART RATE: 87 BPM | DIASTOLIC BLOOD PRESSURE: 56 MMHG | OXYGEN SATURATION: 99 % | SYSTOLIC BLOOD PRESSURE: 150 MMHG | RESPIRATION RATE: 18 BRPM | TEMPERATURE: 97.9 F

## 2020-06-09 PROCEDURE — G0299 HHS/HOSPICE OF RN EA 15 MIN: HCPCS

## 2020-06-09 PROCEDURE — 3331090002 HH PPS REVENUE DEBIT

## 2020-06-09 PROCEDURE — 3331090001 HH PPS REVENUE CREDIT

## 2020-06-10 PROCEDURE — 3331090002 HH PPS REVENUE DEBIT

## 2020-06-10 PROCEDURE — 3331090001 HH PPS REVENUE CREDIT

## 2020-06-11 ENCOUNTER — HOME CARE VISIT (OUTPATIENT)
Dept: HOME HEALTH SERVICES | Facility: HOME HEALTH | Age: 80
End: 2020-06-11
Payer: MEDICARE

## 2020-06-11 PROCEDURE — 3331090002 HH PPS REVENUE DEBIT

## 2020-06-11 PROCEDURE — 3331090001 HH PPS REVENUE CREDIT

## 2020-06-12 ENCOUNTER — HOME CARE VISIT (OUTPATIENT)
Dept: HOME HEALTH SERVICES | Facility: HOME HEALTH | Age: 80
End: 2020-06-12
Payer: MEDICARE

## 2020-06-12 ENCOUNTER — HOME CARE VISIT (OUTPATIENT)
Dept: SCHEDULING | Facility: HOME HEALTH | Age: 80
End: 2020-06-12
Payer: MEDICARE

## 2020-06-12 PROCEDURE — 3331090001 HH PPS REVENUE CREDIT

## 2020-06-12 PROCEDURE — 3331090002 HH PPS REVENUE DEBIT

## 2020-06-12 PROCEDURE — G0299 HHS/HOSPICE OF RN EA 15 MIN: HCPCS

## 2020-06-13 PROCEDURE — 3331090001 HH PPS REVENUE CREDIT

## 2020-06-13 PROCEDURE — 3331090002 HH PPS REVENUE DEBIT

## 2020-06-14 PROCEDURE — 3331090001 HH PPS REVENUE CREDIT

## 2020-06-14 PROCEDURE — 3331090002 HH PPS REVENUE DEBIT

## 2020-06-15 VITALS
TEMPERATURE: 98.7 F | DIASTOLIC BLOOD PRESSURE: 72 MMHG | RESPIRATION RATE: 20 BRPM | SYSTOLIC BLOOD PRESSURE: 110 MMHG | HEART RATE: 72 BPM | OXYGEN SATURATION: 98 %

## 2020-06-15 PROCEDURE — 3331090001 HH PPS REVENUE CREDIT

## 2020-06-15 PROCEDURE — 3331090002 HH PPS REVENUE DEBIT

## 2020-06-16 ENCOUNTER — HOSPITAL ENCOUNTER (EMERGENCY)
Age: 80
Discharge: HOME | End: 2020-06-16
Attending: EMERGENCY MEDICINE | Admitting: EMERGENCY MEDICINE
Payer: MEDICARE

## 2020-06-16 ENCOUNTER — HOME CARE VISIT (OUTPATIENT)
Dept: HOME HEALTH SERVICES | Facility: HOME HEALTH | Age: 80
End: 2020-06-16
Payer: MEDICARE

## 2020-06-16 ENCOUNTER — APPOINTMENT (OUTPATIENT)
Dept: CT IMAGING | Age: 80
End: 2020-06-16
Attending: EMERGENCY MEDICINE
Payer: MEDICARE

## 2020-06-16 ENCOUNTER — APPOINTMENT (OUTPATIENT)
Dept: GENERAL RADIOLOGY | Age: 80
End: 2020-06-16
Attending: EMERGENCY MEDICINE
Payer: MEDICARE

## 2020-06-16 VITALS
SYSTOLIC BLOOD PRESSURE: 143 MMHG | HEART RATE: 80 BPM | DIASTOLIC BLOOD PRESSURE: 64 MMHG | TEMPERATURE: 98.5 F | BODY MASS INDEX: 26.68 KG/M2 | HEIGHT: 62 IN | OXYGEN SATURATION: 99 % | RESPIRATION RATE: 19 BRPM | WEIGHT: 145 LBS

## 2020-06-16 DIAGNOSIS — E11.649 TYPE 2 DIABETES MELLITUS WITH HYPOGLYCEMIA WITHOUT COMA, WITH LONG-TERM CURRENT USE OF INSULIN (HCC): ICD-10-CM

## 2020-06-16 DIAGNOSIS — E16.2 HYPOGLYCEMIA: Primary | ICD-10-CM

## 2020-06-16 DIAGNOSIS — Z79.4 TYPE 2 DIABETES MELLITUS WITH HYPOGLYCEMIA WITHOUT COMA, WITH LONG-TERM CURRENT USE OF INSULIN (HCC): ICD-10-CM

## 2020-06-16 LAB
ALBUMIN SERPL-MCNC: 3.5 G/DL (ref 3.5–5)
ALBUMIN/GLOB SERPL: 0.9 {RATIO} (ref 1.1–2.2)
ALP SERPL-CCNC: 132 U/L (ref 45–117)
ALT SERPL-CCNC: 46 U/L (ref 12–78)
ANION GAP SERPL CALC-SCNC: 9 MMOL/L (ref 5–15)
APPEARANCE UR: CLEAR
AST SERPL-CCNC: 24 U/L (ref 15–37)
ATRIAL RATE: 89 BPM
BACTERIA URNS QL MICRO: NEGATIVE /HPF
BASOPHILS # BLD: 0 K/UL (ref 0–0.1)
BASOPHILS NFR BLD: 0 % (ref 0–1)
BILIRUB SERPL-MCNC: 0.3 MG/DL (ref 0.2–1)
BILIRUB UR QL CFM: NEGATIVE
BUN SERPL-MCNC: 22 MG/DL (ref 6–20)
BUN/CREAT SERPL: 20 (ref 12–20)
CALCIUM SERPL-MCNC: 8.8 MG/DL (ref 8.5–10.1)
CALCULATED P AXIS, ECG09: 44 DEGREES
CALCULATED R AXIS, ECG10: -38 DEGREES
CALCULATED T AXIS, ECG11: 33 DEGREES
CHLORIDE SERPL-SCNC: 108 MMOL/L (ref 97–108)
CO2 SERPL-SCNC: 25 MMOL/L (ref 21–32)
COLOR UR: ABNORMAL
CREAT SERPL-MCNC: 1.09 MG/DL (ref 0.55–1.02)
DIAGNOSIS, 93000: NORMAL
DIFFERENTIAL METHOD BLD: NORMAL
EOSINOPHIL # BLD: 0.1 K/UL (ref 0–0.4)
EOSINOPHIL NFR BLD: 2 % (ref 0–7)
EPITH CASTS URNS QL MICRO: ABNORMAL /LPF
ERYTHROCYTE [DISTWIDTH] IN BLOOD BY AUTOMATED COUNT: 14.1 % (ref 11.5–14.5)
GLOBULIN SER CALC-MCNC: 3.8 G/DL (ref 2–4)
GLUCOSE BLD STRIP.AUTO-MCNC: 206 MG/DL (ref 65–100)
GLUCOSE BLD STRIP.AUTO-MCNC: 220 MG/DL (ref 65–100)
GLUCOSE BLD STRIP.AUTO-MCNC: 63 MG/DL (ref 65–100)
GLUCOSE BLD STRIP.AUTO-MCNC: 67 MG/DL (ref 65–100)
GLUCOSE SERPL-MCNC: 57 MG/DL (ref 65–100)
GLUCOSE UR STRIP.AUTO-MCNC: >1000 MG/DL
HCT VFR BLD AUTO: 37 % (ref 35–47)
HGB BLD-MCNC: 12.6 G/DL (ref 11.5–16)
HGB UR QL STRIP: NEGATIVE
HYALINE CASTS URNS QL MICRO: ABNORMAL /LPF (ref 0–5)
IMM GRANULOCYTES # BLD AUTO: 0 K/UL (ref 0–0.04)
IMM GRANULOCYTES NFR BLD AUTO: 0 % (ref 0–0.5)
INR PPP: 1 (ref 0.9–1.1)
KETONES UR QL STRIP.AUTO: 80 MG/DL
LEUKOCYTE ESTERASE UR QL STRIP.AUTO: ABNORMAL
LYMPHOCYTES # BLD: 1 K/UL (ref 0.8–3.5)
LYMPHOCYTES NFR BLD: 19 % (ref 12–49)
MAGNESIUM SERPL-MCNC: 2.4 MG/DL (ref 1.6–2.4)
MCH RBC QN AUTO: 32 PG (ref 26–34)
MCHC RBC AUTO-ENTMCNC: 34.1 G/DL (ref 30–36.5)
MCV RBC AUTO: 93.9 FL (ref 80–99)
MONOCYTES # BLD: 0.7 K/UL (ref 0–1)
MONOCYTES NFR BLD: 13 % (ref 5–13)
NEUTS SEG # BLD: 3.5 K/UL (ref 1.8–8)
NEUTS SEG NFR BLD: 66 % (ref 32–75)
NITRITE UR QL STRIP.AUTO: NEGATIVE
NRBC # BLD: 0 K/UL (ref 0–0.01)
NRBC BLD-RTO: 0 PER 100 WBC
P-R INTERVAL, ECG05: 138 MS
PH UR STRIP: 5 [PH] (ref 5–8)
PLATELET # BLD AUTO: 303 K/UL (ref 150–400)
PMV BLD AUTO: 9.9 FL (ref 8.9–12.9)
POTASSIUM SERPL-SCNC: 3.7 MMOL/L (ref 3.5–5.1)
PROT SERPL-MCNC: 7.3 G/DL (ref 6.4–8.2)
PROT UR STRIP-MCNC: 30 MG/DL
PROTHROMBIN TIME: 10.1 SEC (ref 9–11.1)
Q-T INTERVAL, ECG07: 354 MS
QRS DURATION, ECG06: 86 MS
QTC CALCULATION (BEZET), ECG08: 430 MS
RBC # BLD AUTO: 3.94 M/UL (ref 3.8–5.2)
RBC #/AREA URNS HPF: ABNORMAL /HPF (ref 0–5)
SERVICE CMNT-IMP: ABNORMAL
SERVICE CMNT-IMP: NORMAL
SODIUM SERPL-SCNC: 142 MMOL/L (ref 136–145)
SP GR UR REFRACTOMETRY: 1.03 (ref 1–1.03)
UROBILINOGEN UR QL STRIP.AUTO: 0.2 EU/DL (ref 0.2–1)
VENTRICULAR RATE, ECG03: 89 BPM
WBC # BLD AUTO: 5.3 K/UL (ref 3.6–11)
WBC URNS QL MICRO: ABNORMAL /HPF (ref 0–4)

## 2020-06-16 PROCEDURE — 71045 X-RAY EXAM CHEST 1 VIEW: CPT

## 2020-06-16 PROCEDURE — 80053 COMPREHEN METABOLIC PANEL: CPT

## 2020-06-16 PROCEDURE — 81001 URINALYSIS AUTO W/SCOPE: CPT

## 2020-06-16 PROCEDURE — 74011250637 HC RX REV CODE- 250/637: Performed by: EMERGENCY MEDICINE

## 2020-06-16 PROCEDURE — 70450 CT HEAD/BRAIN W/O DYE: CPT

## 2020-06-16 PROCEDURE — 3331090001 HH PPS REVENUE CREDIT

## 2020-06-16 PROCEDURE — 36415 COLL VENOUS BLD VENIPUNCTURE: CPT

## 2020-06-16 PROCEDURE — 93005 ELECTROCARDIOGRAM TRACING: CPT

## 2020-06-16 PROCEDURE — 83735 ASSAY OF MAGNESIUM: CPT

## 2020-06-16 PROCEDURE — 85025 COMPLETE CBC W/AUTO DIFF WBC: CPT

## 2020-06-16 PROCEDURE — 3331090002 HH PPS REVENUE DEBIT

## 2020-06-16 PROCEDURE — 74011000250 HC RX REV CODE- 250: Performed by: EMERGENCY MEDICINE

## 2020-06-16 PROCEDURE — 99285 EMERGENCY DEPT VISIT HI MDM: CPT

## 2020-06-16 PROCEDURE — 85610 PROTHROMBIN TIME: CPT

## 2020-06-16 PROCEDURE — 82962 GLUCOSE BLOOD TEST: CPT

## 2020-06-16 RX ORDER — ACETAMINOPHEN 325 MG/1
650 TABLET ORAL
Status: COMPLETED | OUTPATIENT
Start: 2020-06-16 | End: 2020-06-16

## 2020-06-16 RX ORDER — DEXTROSE 50 % IN WATER (D50W) INTRAVENOUS SYRINGE
50
Status: COMPLETED | OUTPATIENT
Start: 2020-06-16 | End: 2020-06-16

## 2020-06-16 RX ADMIN — ACETAMINOPHEN 650 MG: 325 TABLET, FILM COATED ORAL at 13:29

## 2020-06-16 RX ADMIN — DEXTROSE MONOHYDRATE 25 G: 25 INJECTION, SOLUTION INTRAVENOUS at 10:20

## 2020-06-16 NOTE — ED NOTES
Assisted pt to turn s/s, perineal care done and placed on bedpan. Voided 200cc without difficulty. Placed on clean linens, positioned up in bed for comfort. Meal tray set up; pt feeding self. Urine specimens sent. Pt remains alert on EKG monitor.

## 2020-06-16 NOTE — ED NOTES
Pt's son contacted via cell phone. Will plan to pick pt up at 1500. Pt assisted into brief, scrub pants and socks for discharge. I have reviewed discharge instructions with the patient. The patient verbalized understanding. Reviewed plan with pts son via phone. Pt stable for discharge.

## 2020-06-16 NOTE — ED PROVIDER NOTES
EMERGENCY DEPARTMENT HISTORY AND PHYSICAL EXAM      Date: 6/16/2020  Patient Name: Demetra Valencia    Please note that this dictation was completed with [email or web address], the computer voice recognition software. Quite often unanticipated grammatical, syntax, homophones, and other interpretive errors are inadvertently transcribed by the computer software. Please disregard these errors. Please excuse any errors that have escaped final proofreading. History of Presenting Illness     Chief Complaint   Patient presents with    Altered mental status     Pt arrives to ER via EMS coming from home. EMS states pt's BG was 47 at pt's home, EMS gave 15g of oral glucose PTA. EMS states BG went to 70 after tx. EMS states that the pt has significant hx of calling 911 for hypoglycemia. EMS states pt has residual slurred speech, and L sided weakness that is not her baseline. Dr. Linette Rodriguez at bedside to assess for code S. History Provided By: Patient     HPI: Demetra Valencia, 67 y.o. female, presenting the emergency department with slurred speech and altered mental status, found to have hypoglycemia prehospital with a blood sugar in the 40s. Given D50 found to have blood sugar in the 60s, still somewhat slightly altered per EMS. On evaluation here EMS voiced concern for possible stroke, my evaluation showed no focal neurologic deficit, NIH of 0. Blood sugar still low at in the 60s. Patient has no complaints at this time. Denies any fever chills. Denies any chest pain or shortness of breath. She is poor historian, does have a history of vascular dementia. She is oriented to self and place, but not completely to time or situation. She is seen here frequently for diabetic related issues. EMS reports that they were called approximately twice a week for hypoglycemia. PCP: Asa Corea MD    No current facility-administered medications on file prior to encounter.       Current Outpatient Medications on File Prior to Encounter   Medication Sig Dispense Refill    metoprolol succinate (TOPROL-XL) 25 mg XL tablet TAKE 1 TABLET BY MOUTH EVERY DAY 90 Tab 3    insulin degludec James Marias FlexTouch U-100) 100 unit/mL (3 mL) inpn 18 Units by SubCUTAneous route daily. 2 Adjustable Dose Pre-filled Pen Syringe 11    pravastatin (PRAVACHOL) 80 mg tablet TAKE 1 TABLET BY MOUTH at bedtime 90 Tab 3    levothyroxine (SYNTHROID) 175 mcg tablet TAKE 1 TABLET BY MOUTH EVERY DAY 90 Tab 3    clopidogreL (PLAVIX) 75 mg tab TAKE 1 TABLET BY MOUTH EVERY DAY 90 Tab 3    amLODIPine (NORVASC) 10 mg tablet TAKE 1 TABLET BY MOUTH EVERY DAY IN THE MORNING 90 Tab 3    flash glucose scanning reader (TheOfficialBoardSTYLE KEVON 14 DAY READER) [facility] [place] directed[place] 1 Each 5    flash glucose sensor (FREESTYLE KEVON 14 DAY SENSOR) kit Continuous monitoring 2 Kit 11    glucose blood VI test strips (ONETOUCH ULTRA BLUE TEST STRIP) strip USE TO CHECK BLOOD SUGARS DAILY. Dx: E11.649 100 Strip 11    Blood-Glucose Meter (ONETOUCH ULTRA2 METER) misc USE TO CHECK BLOOD SUGARS DAILY. 1 Each 0    insulin aspart U-100 (NOVOLOG) 100 unit/mL (3 mL) inpn 4 units AC breakfast,lunch, and dinner (Patient taking differently: 4 Units by SubCUTAneous route Before breakfast, lunch, and dinner. 4 units AC breakfast,lunch, and dinner) 1 Adjustable Dose Pre-filled Pen Syringe 11    brimonidine (ALPHAGAN) 0.15 % ophthalmic solution Administer 1 Drop to both eyes two (2) times a day.          Past History     Past Medical History:  Past Medical History:   Diagnosis Date    Heart failure ([address]     unknown to family    Hypertension     Stroke [facility])        Past Surgical History:  Past Surgical History:   Procedure Laterality Date    HX GYN      HX HEENT      thyroidectomy    HX HYSTERECTOMY      REMOVAL GALLBLADDER      THYROIDECTOMY         Family History:  Family History   Problem Relation Age of Onset    Diabetes Father     No Known Problems Mother        Social History:  Social History Tobacco Use    Smoking status: Never Smoker    Smokeless tobacco: Never Used   Substance Use Topics    Alcohol use: No     Comment: been years    Drug use: No       Allergies:  No Known Allergies      Review of Systems   Review of Systems   Constitutional: Negative for chills and fever. Respiratory: Negative for shortness of breath. Gastrointestinal: Positive for abdominal pain. Negative for nausea and vomiting. Musculoskeletal: Negative for myalgias. Skin: Negative for rash and wound. Neurological: Positive for speech difficulty (per ems) and headaches. Negative for weakness. All other systems reviewed and are negative. Physical Exam   Physical Exam  Vitals signs and nursing note reviewed. Constitutional:       Appearance: She is well-developed. HENT:      Head: Normocephalic and atraumatic. Eyes:      General:         Right eye: No discharge. Left eye: No discharge. Conjunctiva/sclera: Conjunctivae normal.      Pupils: Pupils are equal, round, and reactive to light. Neck:      Musculoskeletal: Normal range of motion and neck supple. Trachea: No tracheal deviation. Cardiovascular:      Rate and Rhythm: Normal rate and regular rhythm. Heart sounds: Normal heart sounds. No murmur. Pulmonary:      Effort: Pulmonary effort is normal. No respiratory distress. Breath sounds: Normal breath sounds. No wheezing or rales. Abdominal:      General: Bowel sounds are normal.      Palpations: Abdomen is soft. Tenderness: There is no abdominal tenderness. There is no guarding or rebound. Musculoskeletal: Normal range of motion. General: No tenderness or deformity. Skin:     General: Skin is warm and dry. Findings: No erythema or rash. Neurological:      Mental Status: She is alert. Mental status is at baseline. Cranial Nerves: No cranial nerve deficit, dysarthria or facial asymmetry. Sensory: No sensory deficit.       Motor: No weakness. Psychiatric:         Behavior: Behavior normal.         Diagnostic Study Results     Labs -     Recent Results (from the past 12 hour(s))   GLUCOSE, POC    Collection Time: 06/16/20 10:03 AM   Result Value Ref Range    Glucose (POC) 63 (L) 65 - 100 mg/dL    Performed by Anna Johnson    CBC WITH AUTOMATED DIFF    Collection Time: 06/16/20 10:06 AM   Result Value Ref Range    WBC 5.3 3.6 - 11.0 K/uL    RBC 3.94 3.80 - 5.20 M/uL    HGB 12.6 11.5 - 16.0 g/dL    HCT 37.0 35.0 - 47.0 %    MCV 93.9 80.0 - 99.0 FL    MCH 32.0 26.0 - 34.0 PG    MCHC 34.1 30.0 - 36.5 g/dL    RDW 14.1 11.5 - 14.5 %    PLATELET 486 130 - 334 K/uL    MPV 9.9 8.9 - 12.9 FL    NRBC 0.0 0  WBC    ABSOLUTE NRBC 0.00 0.00 - 0.01 K/uL    NEUTROPHILS 66 32 - 75 %    LYMPHOCYTES 19 12 - 49 %    MONOCYTES 13 5 - 13 %    EOSINOPHILS 2 0 - 7 %    BASOPHILS 0 0 - 1 %    IMMATURE GRANULOCYTES 0 0.0 - 0.5 %    ABS. NEUTROPHILS 3.5 1.8 - 8.0 K/UL    ABS. LYMPHOCYTES 1.0 0.8 - 3.5 K/UL    ABS. MONOCYTES 0.7 0.0 - 1.0 K/UL    ABS. EOSINOPHILS 0.1 0.0 - 0.4 K/UL    ABS. BASOPHILS 0.0 0.0 - 0.1 K/UL    ABS. IMM. GRANS. 0.0 0.00 - 0.04 K/UL    DF AUTOMATED     METABOLIC PANEL, COMPREHENSIVE    Collection Time: 06/16/20 10:06 AM   Result Value Ref Range    Sodium 142 136 - 145 mmol/L    Potassium 3.7 3.5 - 5.1 mmol/L    Chloride 108 97 - 108 mmol/L    CO2 25 21 - 32 mmol/L    Anion gap 9 5 - 15 mmol/L    Glucose 57 (L) 65 - 100 mg/dL    BUN 22 (H) 6 - 20 MG/DL    Creatinine 1.09 (H) 0.55 - 1.02 MG/DL    BUN/Creatinine ratio 20 12 - 20      GFR est AA 59 (L) >60 ml/min/1.73m2    GFR est non-AA 49 (L) >60 ml/min/1.73m2    Calcium 8.8 8.5 - 10.1 MG/DL    Bilirubin, total 0.3 0.2 - 1.0 MG/DL    ALT (SGPT) 46 12 - 78 U/L    AST (SGOT) 24 15 - 37 U/L    Alk.  phosphatase 132 (H) 45 - 117 U/L    Protein, total 7.3 6.4 - 8.2 g/dL    Albumin 3.5 3.5 - 5.0 g/dL    Globulin 3.8 2.0 - 4.0 g/dL    A-G Ratio 0.9 (L) 1.1 - 2.2     PROTHROMBIN TIME + INR Collection Time: 06/16/20 10:06 AM   Result Value Ref Range    INR 1.0 0.9 - 1.1      Prothrombin time 10.1 9.0 - 11.1 sec   MAGNESIUM    Collection Time: 06/16/20 10:06 AM   Result Value Ref Range    Magnesium 2.4 1.6 - 2.4 mg/dL   GLUCOSE, POC    Collection Time: 06/16/20 10:18 AM   Result Value Ref Range    Glucose (POC) 67 65 - 100 mg/dL    Performed by Roger GONSALVESN    EKG, 12 LEAD, INITIAL    Collection Time: 06/16/20 10:27 AM   Result Value Ref Range    Ventricular Rate 89 BPM    Atrial Rate 89 BPM    P-R Interval 138 ms    QRS Duration 86 ms    Q-T Interval 354 ms    QTC Calculation (Bezet) 430 ms    Calculated P Axis 44 degrees    Calculated R Axis -38 degrees    Calculated T Axis 33 degrees    Diagnosis       Sinus rhythm with occasional premature ventricular complexes  Left axis deviation  Possible Anterior infarct (cited on or before 23-MAY-2020)  When compared with ECG of 23-MAY-2020 18:01,  premature ventricular complexes are now present  premature atrial complexes are no longer present  Questionable change in initial forces of Anteroseptal leads  Nonspecific T wave abnormality now evident in Lateral leads     GLUCOSE, POC    Collection Time: 06/16/20 10:34 AM   Result Value Ref Range    Glucose (POC) 206 (H) 65 - 100 mg/dL    Performed by Roger CANTRELL    URINALYSIS W/ RFLX MICROSCOPIC    Collection Time: 06/16/20 11:29 AM   Result Value Ref Range    Color YELLOW/STRAW      Appearance CLEAR CLEAR      Specific gravity 1.026 1.003 - 1.030      pH (UA) 5.0 5.0 - 8.0      Protein 30 (A) NEG mg/dL    Glucose >1,000 (A) NEG mg/dL    Ketone 80 (A) NEG mg/dL    Blood Negative NEG      Urobilinogen 0.2 0.2 - 1.0 EU/dL    Nitrites Negative NEG      Leukocyte Esterase MODERATE (A) NEG      WBC 20-50 0 - 4 /hpf    RBC 0-5 0 - 5 /hpf    Epithelial cells FEW FEW /lpf    Bacteria Negative NEG /hpf    Hyaline cast 0-2 0 - 5 /lpf   BILIRUBIN, CONFIRM    Collection Time: 06/16/20 11:29 AM   Result Value Ref Range    Bilirubin UA, confirm Negative NEG     GLUCOSE, POC    Collection Time: 06/16/20 12:40 PM   Result Value Ref Range    Glucose (POC) 220 (H) 65 - 100 mg/dL    Performed by Zachary CANTRELL        Radiologic Studies -   CT HEAD WO CONT   Final Result   IMPRESSION:    No acute process or change compared to the prior exam.            XR CHEST PORT   Final Result   IMPRESSION:    1. No acute findings. 2. Deformity of left proximal humerus which could further be assessed by plain   film if indicated. CT Results  (Last 48 hours)               06/16/20 1049  CT HEAD WO CONT Final result    Impression:  IMPRESSION:    No acute process or change compared to the prior exam.               Narrative:  EXAM: CT HEAD WO CONT       INDICATION: Acute altered mental status, left-sided weakness, slurred speech       COMPARISON: 12/1/2019. CONTRAST: None. TECHNIQUE: Unenhanced CT of the head was performed using 5 mm images. Brain and   bone windows were generated. Coronal and sagittal reformats. CT dose reduction   was achieved through use of a standardized protocol tailored for this   examination and automatic exposure control for dose modulation. FINDINGS:   Generalized atrophy is again noted. Small vessel schema changes are stable   compared to the prior exam. There is no intracranial hemorrhage, extra-axial   collection, or mass effect. The basilar cisterns are open. No CT evidence of   acute infarct. The bone windows demonstrate no abnormalities. The visualized portions of the   paranasal sinuses and mastoid air cells are clear. Vascular calcification is   noted. CXR Results  (Last 48 hours)               06/16/20 1025  XR CHEST PORT Final result    Impression:  IMPRESSION:    1. No acute findings. 2. Deformity of left proximal humerus which could further be assessed by plain   film if indicated.        Narrative:  EXAM: XR CHEST PORT       INDICATION: altered COMPARISON: February 19       FINDINGS: A portable AP radiograph of the chest was obtained at 1006 hours. The   patient is on a cardiac monitor. The lungs are clear. The cardiac and   mediastinal contours and pulmonary vascularity are normal.  There are   degenerative changes of the spine. There is elevation of the right   hemidiaphragm. There is deformity of the left proximal humerus. Medical Decision Making   I am the first provider for this patient. I reviewed the vital signs, available nursing notes, past medical history, past surgical history, family history and social history. Vital Signs-Reviewed the patient's vital signs. Patient Vitals for the past 12 hrs:   Temp Pulse Resp BP SpO2   06/16/20 1330  80 19  99 %   06/16/20 1300  81 21 143/64 98 %   06/16/20 1200  87 19 147/64 99 %   06/16/20 1118  87 27 146/73 100 %   06/16/20 1030  91 15 134/81 99 %   06/16/20 1005  89 18 155/58 100 %   06/16/20 1003 98.5 °F (36.9 °C) 91 15 155/58 100 %       EKG interpretation: (Preliminary)  EKG shows sinus rhythm, rate 89. Left axis deviation. Nonspecific anterior T wave changes. No evidence of acute ST segment elevation myocardial infarction. Interpreted by me    Records Reviewed:   Nursing notes, Prior visits     Provider Notes (Medical Decision Making):   Patient presenting with report of slurred speech in the presence of hypoglycemia. No other focal deficit. Patient's blood sugar improved after IV dextrose here in the emergency department and p.o. intake. Her speech improved. Imaging and labs were otherwise unremarkable. Patient safe for discharge to home. ED Course:   Initial assessment performed. The patients presenting problems have been discussed, and they are in agreement with the care plan formulated and outlined with them. I have encouraged them to ask questions as they arise throughout their visit.     ED Course as of Jun 16 1431   Tue Jun 16, 2020   1301 Patient's blood sugar now 220, she is awake alert and conversant. Speech is clear and fluent. She appears well and nontoxic. She reports that she did not eat dinner last night before going to bed, but still took her insulin. Hypoglycemia likely secondary to decreased p.o. intake. Ketones seen likely secondary to starvation, no evidence of DKA on labs. [AR]      ED Course User Index  [AR] Henretta Collet, DO             Critical Care Time:   none    Disposition:  DISCHARGE NOTE  Patients results have been reviewed with them. Patient and/or family have verbally conveyed their understanding and agreement of the patient's signs, symptoms, diagnosis, treatment and prognosis and additionally agree to follow up as recommended or return to the Emergency Room should their condition change or have any new concerns prior to their follow-up appointment. Patient verbally agrees with the care-plan and verbally conveys that all of their questions have been answered. Discharge instructions have also been provided to the patient with some educational information regarding their diagnosis as well a list of reasons why they would want to return to the ER prior to their follow-up appointment should their condition change. PLAN:  1. Current Discharge Medication List        2. Follow-up Information     Follow up With Specialties Details Why Contact Info    Shankar Dominguez MD Internal Medicine Schedule an appointment as soon as possible for a visit  [address] [address]  [place] [address] [phone]      4 [facility] EMERGENCY DEPT Emergency Medicine  If symptoms worsen [address]  [address] [facility]  [phone]          Return to ED if worse     Diagnosis     Clinical Impression:   1. Hypoglycemia    2. Type 2 diabetes mellitus with hypoglycemia without coma, with long-term current use of insulin ([facility] [address]        Attestations:   This note was completed by Terrence Zambrano DO

## 2020-06-16 NOTE — ED NOTES
Instructed pt to call for ride home;  Pt requested med for headache. Case discussed with Dr Woody Garcia and med ordered.

## 2020-06-16 NOTE — DISCHARGE INSTRUCTIONS
Patient Education        Learning About Low Blood Sugar (Hypoglycemia) in Diabetes  What is low blood sugar (hypoglycemia)? Hypoglycemia means that your blood sugar is low and your body (especially your brain) is not getting enough fuel. If you have diabetes, your blood sugar can go too low if you take too much of some diabetes medicines. It can also go too low if you miss a meal. And it can happen if you exercise too hard without eating enough food. Some medicines used to treat other health problems can cause low blood sugar too. What are the symptoms? Symptoms of low blood sugar can start quickly. It may take just 10 to 15 minutes. If you have had diabetes for many years, you may not realize that your blood sugar is low until it drops very low. · If your blood sugar level drops below 70 (mild low blood sugar), you may feel tired, anxious, dizzy, weak, shaky, or sweaty. You may have a fast heartbeat or blurry vision. · If your blood sugar level continues to drop (usually below 40), your behavior may change. You may feel more irritable. You may find it hard to concentrate or talk. And you may feel unsteady when you stand or walk. You may become too weak or confused to eat something with sugar to raise your blood sugar level. · If your blood sugar level drops very low (usually below 20), you may pass out (lose consciousness). Or you may have a seizure or stroke. If you have symptoms of severe low blood sugar, you need to get medical care right away. If you had a low blood sugar level during the night, you may wake up tired or with a headache. Or you may sweat so much during the night that your pajamas or sheets are damp when you wake up. How is low blood sugar treated? You can treat low blood sugar by eating or drinking something that has 15 grams of carbohydrate. These should be quick-sugar foods.  Check your blood sugar level again 15 minutes after having a quick-sugar food to make sure your level is getting back to your target range. Children usually need less than 15 grams of carbohydrate. Check with your doctor or diabetes educator for the amount that is right for your child. Here are examples of quick-sugar foods that have 15 grams of carbohydrate:  · 3 to 4 glucose tablets  · 1 tablespoon (3 teaspoons) of table sugar  · 1 tablespoon (3 teaspoons) honey  · ½ cup to ¾ cup (4 to 6 ounces) of fruit juice or regular (not diet) soda  · Hard candy (such as 6 Life Savers)  If you have problems with severe low blood sugar, someone else may have to give you a shot of glucagon. This is a hormone that raises blood sugar levels quickly. How can you prevent low blood sugar? You can take steps to prevent low blood sugar. · Follow your treatment plan. Take your insulin or other diabetes medicine exactly as your doctor prescribed it. Talk with your doctor if you're having low blood sugar often. Your medicine may need to be adjusted if it's causing your low blood sugar. · Check your blood sugar levels often. This helps you find early changes before an emergency happens. · Keep a quick-sugar food with you in case your blood sugar level drops low. · Eat small meals more often so that you don't get too hungry between meals. Don't skip meals. · Balance extra exercise with eating more. Check your blood sugar and learn how it changes after exercise. If your blood sugar stays at a normal level, you may not need to eat after you exercise. · Limit how much alcohol you drink. Alcohol can make low blood sugar go even lower. Don't drink alcohol if you have problems recognizing the early signs of low blood sugar. · Keep a diary of your symptoms. This helps you learn when changes in your body may signal low blood sugar. And keep track of how often you have low blood sugar, including when you last ate and what you ate. This will help you learn what causes your blood sugar to drop. · Learn about diabetes and low blood sugar. Support groups or a diabetes education center can help you understand how medicines, diet, and exercise affect your blood sugar levels. Since low blood sugar levels can quickly become an emergency, be sure to wear medical alert jewelry, such as a medical alert bracelet. This is to let people know you have diabetes so they can get help for you. You can buy this at most drugstores. And make sure your family, friends, and coworkers know the symptoms of low blood sugar. Teach them what to do to get your sugar level up. Follow-up care is a key part of your treatment and safety. Be sure to make and go to all appointments, and call your doctor if you are having problems. It's also a good idea to know your test results and keep a list of the medicines you take. Where can you learn more? Go to http://toi-sunil.info/  Enter T511 in the search box to learn more about \"Learning About Low Blood Sugar (Hypoglycemia) in Diabetes. \"  Current as of: December 20, 2019               Content Version: 12.5  © 6084-2870 [employer]. Care instructions adapted under license by [employer] (which disclaims liability or warranty for this information). If you have questions about a medical condition or this instruction, always ask your healthcare professional. [address] any warranty or liability for your use of this information.

## 2020-06-17 ENCOUNTER — PATIENT OUTREACH (OUTPATIENT)
Dept: FAMILY MEDICINE CLINIC | Age: 80
End: 2020-06-17

## 2020-06-17 PROCEDURE — 3331090001 HH PPS REVENUE CREDIT

## 2020-06-17 PROCEDURE — 3331090002 HH PPS REVENUE DEBIT

## 2020-06-17 NOTE — PROGRESS NOTES
Patient contacted regarding recent discharge and COVID-19 risk. Discussed COVID-19 related testing which was not done at this time. Test results were not done. Patient informed of results, if available? no    Care Transition Nurse/ Ambulatory Care Manager contacted the patient by telephone to perform post discharge assessment. Verified name and  with patient as identifiers. Patient has following risk factors of: diabetes. CTN/ACM reviewed discharge instructions, medical action plan and red flags related to discharge diagnosis. Reviewed and educated them on any new and changed medications related to discharge diagnosis. Advised obtaining a 90-day supply of all daily and as-needed medications. No new medications prescribed. Patient instructed to make sure she eats a meal when taking her insulin at night. Education provided regarding infection prevention, and signs and symptoms of COVID-19 and when to seek medical attention with patient who verbalized understanding. Discussed exposure protocols and quarantine from [address] you at higher risk for severe illness  and given an opportunity for questions and concerns. The patient agrees to contact the COVID-19 hotline 120-628-1241 or PCP office for questions related to their healthcare. CTN/ACM provided contact information for future reference. Patient stated she has contacted her PCP and is waiting for call back from PCP. From CDC: Are you at higher risk for severe illness?  Wash your hands often.  Avoid close contact (6 feet, which is about two arm lengths) with people who are sick.  Put distance between yourself and other people if COVID-19 is spreading in your community.  Clean and disinfect frequently touched surfaces.  Avoid all cruise travel and non-essential air travel.  Call your healthcare professional if you have concerns about COVID-19 and your underlying condition or if you are sick.     For more information on steps you can take to protect yourself, see CDC's How to Protect Yourself      Patient/family/caregiver given information for [facility] and agrees to enroll no  Patient's preferred e-mail:  n/a  Patient's preferred phone number: n/a    Plan for follow-up call in 7-14 days based on severity of symptoms and risk factors. DMB

## 2020-06-18 ENCOUNTER — HOSPITAL ENCOUNTER (EMERGENCY)
Age: 80
Discharge: HOME | End: 2020-06-18
Attending: EMERGENCY MEDICINE
Payer: MEDICARE

## 2020-06-18 ENCOUNTER — HOME CARE VISIT (OUTPATIENT)
Dept: HOME HEALTH SERVICES | Facility: HOME HEALTH | Age: 80
End: 2020-06-18
Payer: MEDICARE

## 2020-06-18 VITALS
BODY MASS INDEX: 24.73 KG/M2 | HEIGHT: 63 IN | WEIGHT: 139.55 LBS | OXYGEN SATURATION: 100 % | RESPIRATION RATE: 16 BRPM | DIASTOLIC BLOOD PRESSURE: 86 MMHG | TEMPERATURE: 98.2 F | SYSTOLIC BLOOD PRESSURE: 143 MMHG | HEART RATE: 70 BPM

## 2020-06-18 DIAGNOSIS — E16.2 HYPOGLYCEMIA: Primary | ICD-10-CM

## 2020-06-18 DIAGNOSIS — N30.00 ACUTE CYSTITIS WITHOUT HEMATURIA: ICD-10-CM

## 2020-06-18 LAB
ALBUMIN SERPL-MCNC: 3.7 G/DL (ref 3.5–5)
ALBUMIN/GLOB SERPL: 0.9 {RATIO} (ref 1.1–2.2)
ALP SERPL-CCNC: 142 U/L (ref 45–117)
ALT SERPL-CCNC: 42 U/L (ref 12–78)
ANION GAP SERPL CALC-SCNC: 7 MMOL/L (ref 5–15)
APPEARANCE UR: ABNORMAL
AST SERPL-CCNC: 26 U/L (ref 15–37)
BACTERIA URNS QL MICRO: ABNORMAL /HPF
BASOPHILS # BLD: 0 K/UL (ref 0–0.1)
BASOPHILS NFR BLD: 0 % (ref 0–1)
BILIRUB SERPL-MCNC: 0.3 MG/DL (ref 0.2–1)
BILIRUB UR QL: NEGATIVE
BUN SERPL-MCNC: 17 MG/DL (ref 6–20)
BUN/CREAT SERPL: 19 (ref 12–20)
CALCIUM SERPL-MCNC: 8.9 MG/DL (ref 8.5–10.1)
CHLORIDE SERPL-SCNC: 103 MMOL/L (ref 97–108)
CO2 SERPL-SCNC: 26 MMOL/L (ref 21–32)
COLOR UR: ABNORMAL
CREAT SERPL-MCNC: 0.88 MG/DL (ref 0.55–1.02)
DIFFERENTIAL METHOD BLD: ABNORMAL
EOSINOPHIL # BLD: 0.1 K/UL (ref 0–0.4)
EOSINOPHIL NFR BLD: 1 % (ref 0–7)
EPITH CASTS URNS QL MICRO: ABNORMAL /LPF
ERYTHROCYTE [DISTWIDTH] IN BLOOD BY AUTOMATED COUNT: 13.5 % (ref 11.5–14.5)
GLOBULIN SER CALC-MCNC: 4.2 G/DL (ref 2–4)
GLUCOSE BLD STRIP.AUTO-MCNC: 123 MG/DL (ref 65–100)
GLUCOSE BLD STRIP.AUTO-MCNC: 131 MG/DL (ref 65–100)
GLUCOSE BLD STRIP.AUTO-MCNC: 154 MG/DL (ref 65–100)
GLUCOSE BLD STRIP.AUTO-MCNC: 222 MG/DL (ref 65–100)
GLUCOSE BLD STRIP.AUTO-MCNC: 322 MG/DL (ref 65–100)
GLUCOSE BLD STRIP.AUTO-MCNC: 485 MG/DL (ref 65–100)
GLUCOSE SERPL-MCNC: 248 MG/DL (ref 65–100)
GLUCOSE UR STRIP.AUTO-MCNC: >1000 MG/DL
HCT VFR BLD AUTO: 40.9 % (ref 35–47)
HGB BLD-MCNC: 13.8 G/DL (ref 11.5–16)
HGB UR QL STRIP: ABNORMAL
IMM GRANULOCYTES # BLD AUTO: 0 K/UL (ref 0–0.04)
IMM GRANULOCYTES NFR BLD AUTO: 0 % (ref 0–0.5)
KETONES UR QL STRIP.AUTO: NEGATIVE MG/DL
LEUKOCYTE ESTERASE UR QL STRIP.AUTO: ABNORMAL
LYMPHOCYTES # BLD: 0.5 K/UL (ref 0.8–3.5)
LYMPHOCYTES NFR BLD: 8 % (ref 12–49)
MCH RBC QN AUTO: 31.9 PG (ref 26–34)
MCHC RBC AUTO-ENTMCNC: 33.7 G/DL (ref 30–36.5)
MCV RBC AUTO: 94.7 FL (ref 80–99)
MONOCYTES # BLD: 0.4 K/UL (ref 0–1)
MONOCYTES NFR BLD: 7 % (ref 5–13)
NEUTS SEG # BLD: 5.4 K/UL (ref 1.8–8)
NEUTS SEG NFR BLD: 84 % (ref 32–75)
NITRITE UR QL STRIP.AUTO: POSITIVE
NRBC # BLD: 0 K/UL (ref 0–0.01)
NRBC BLD-RTO: 0 PER 100 WBC
PH UR STRIP: 5.5 [PH] (ref 5–8)
PLATELET # BLD AUTO: 272 K/UL (ref 150–400)
PMV BLD AUTO: 10.2 FL (ref 8.9–12.9)
POTASSIUM SERPL-SCNC: 3.1 MMOL/L (ref 3.5–5.1)
PROT SERPL-MCNC: 7.9 G/DL (ref 6.4–8.2)
PROT UR STRIP-MCNC: 30 MG/DL
RBC # BLD AUTO: 4.32 M/UL (ref 3.8–5.2)
RBC #/AREA URNS HPF: ABNORMAL /HPF (ref 0–5)
RBC MORPH BLD: ABNORMAL
SERVICE CMNT-IMP: ABNORMAL
SODIUM SERPL-SCNC: 136 MMOL/L (ref 136–145)
SP GR UR REFRACTOMETRY: 1.02 (ref 1–1.03)
UA: UC IF INDICATED,UAUC: ABNORMAL
UROBILINOGEN UR QL STRIP.AUTO: 0.2 EU/DL (ref 0.2–1)
WBC # BLD AUTO: 6.4 K/UL (ref 3.6–11)
WBC URNS QL MICRO: ABNORMAL /HPF (ref 0–4)

## 2020-06-18 PROCEDURE — 87086 URINE CULTURE/COLONY COUNT: CPT

## 2020-06-18 PROCEDURE — 85025 COMPLETE CBC W/AUTO DIFF WBC: CPT

## 2020-06-18 PROCEDURE — 3331090001 HH PPS REVENUE CREDIT

## 2020-06-18 PROCEDURE — 87186 SC STD MICRODIL/AGAR DIL: CPT

## 2020-06-18 PROCEDURE — 36415 COLL VENOUS BLD VENIPUNCTURE: CPT

## 2020-06-18 PROCEDURE — 87077 CULTURE AEROBIC IDENTIFY: CPT

## 2020-06-18 PROCEDURE — 99285 EMERGENCY DEPT VISIT HI MDM: CPT

## 2020-06-18 PROCEDURE — 82962 GLUCOSE BLOOD TEST: CPT

## 2020-06-18 PROCEDURE — 80053 COMPREHEN METABOLIC PANEL: CPT

## 2020-06-18 PROCEDURE — 81001 URINALYSIS AUTO W/SCOPE: CPT

## 2020-06-18 PROCEDURE — 3331090002 HH PPS REVENUE DEBIT

## 2020-06-18 RX ORDER — CEPHALEXIN 500 MG/1
500 CAPSULE ORAL 2 TIMES DAILY
Qty: 14 CAP | Refills: 0 | Status: SHIPPED | OUTPATIENT
Start: 2020-06-18 | End: 2020-06-18 | Stop reason: SDUPTHER

## 2020-06-18 RX ORDER — CEPHALEXIN 500 MG/1
500 CAPSULE ORAL 2 TIMES DAILY
Qty: 14 CAP | Refills: 0 | Status: SHIPPED | OUTPATIENT
Start: 2020-06-18 | End: 2020-06-25

## 2020-06-18 NOTE — ED PROVIDER NOTES
EMERGENCY DEPARTMENT HISTORY AND PHYSICAL EXAM      Date: 6/18/2020  Patient Name: Dirk Cloud    History of Presenting Illness     Chief Complaint   Patient presents with    Low Blood Sugar     Pt arrives EMS from home. Pt woke up screaming for help, son found pt acting wierd. Per EMS BG was 35 at home, EMS administered 1 mg glucagon and 15 oral glucose. Pt is now a/o x4. BG upon arrival to . History Provided By: Patient and EMS    HPI: Dirk Cloud, 67 y.o. female with history of prior stroke, hypertension, type 2 diabetes insulin-dependent presents to the ED with cc of hypoglycemia. EMS was called by son who found patient to be altered this morning upon her awakening, blood glucose was 35 at home and EMS administered 15 g oral glucose and 1 mg intramuscular glucagon. Upon arrival in the ED patient at baseline mentation with blood glucose 123. She does not believe that she ate dinner last night. She has been compliant with her insulin regimen. She denies any fevers, chills, nausea, vomiting, abdominal pain, recent illness. Of note patient has been seen multiple times in the ED for both hypoglycemic episodes and also hyperglycemia resulting in DKA requiring admission. Patient states that she does not know why her glucose levels are so labile, she feels she is compliant but sometimes she misses doses or misses meals. Patient was also seen in the ED 6/16/2020 for similar hypoglycemic episode resolved prior to arrival after administration of 15 g oral glucose, again she stated during that ED encounter that she did not eat dinner the night before. There are no other complaints, changes, or physical findings at this time. PCP: Victorina Tapia MD    No current facility-administered medications on file prior to encounter.       Current Outpatient Medications on File Prior to Encounter   Medication Sig Dispense Refill    metoprolol succinate (TOPROL-XL) 25 mg XL tablet TAKE 1 TABLET BY MOUTH EVERY DAY 90 Tab 3    insulin degludec Marissa Gomes FlexTouch U-100) 100 unit/mL (3 mL) inpn 18 Units by SubCUTAneous route daily. 2 Adjustable Dose Pre-filled Pen Syringe 11    pravastatin (PRAVACHOL) 80 mg tablet TAKE 1 TABLET BY MOUTH at bedtime 90 Tab 3    levothyroxine (SYNTHROID) 175 mcg tablet TAKE 1 TABLET BY MOUTH EVERY DAY 90 Tab 3    clopidogreL (PLAVIX) 75 mg tab TAKE 1 TABLET BY MOUTH EVERY DAY 90 Tab 3    amLODIPine (NORVASC) 10 mg tablet TAKE 1 TABLET BY MOUTH EVERY DAY IN THE MORNING 90 Tab 3    flash glucose scanning reader (FREESTYLE KEVON 14 DAY READER) [facility] As directed1 1 Each 5    flash glucose sensor (FREESTYLE KEVON 14 DAY SENSOR) kit Continuous monitoring 2 Kit 11    glucose blood VI test strips (ONETOUCH ULTRA BLUE TEST STRIP) strip USE TO CHECK BLOOD SUGARS DAILY. Dx: E11.649 100 Strip 11    Blood-Glucose Meter (ONETOUCH ULTRA2 METER) misc USE TO CHECK BLOOD SUGARS DAILY. 1 Each 0    insulin aspart U-100 (NOVOLOG) 100 unit/mL (3 mL) inpn 4 units AC breakfast,lunch, and dinner (Patient taking differently: 4 Units by SubCUTAneous route Before breakfast, lunch, and dinner. 4 units AC breakfast,lunch, and dinner) 1 Adjustable Dose Pre-filled Pen Syringe 11    brimonidine (ALPHAGAN) 0.15 % ophthalmic solution Administer 1 Drop to both eyes two (2) times a day.          Past History     Past Medical History:  Past Medical History:   Diagnosis Date    Heart failure ([address]     unknown to family    Hypertension     Stroke [facility])        Past Surgical History:  Past Surgical History:   Procedure Laterality Date    HX GYN      HX HEENT      thyroidectomy    HX HYSTERECTOMY      REMOVAL GALLBLADDER      THYROIDECTOMY         Family History:  Family History   Problem Relation Age of Onset    Diabetes Father     No Known Problems Mother        Social History:  Social History     Tobacco Use    Smoking status: Never Smoker    Smokeless tobacco: Never Used   Substance Use Topics    Alcohol use: No     Comment: been years    Drug use: No       Allergies:  No Known Allergies      Review of Systems   Review of Systems   Constitutional: Negative for chills and fever. HENT: Negative. Eyes: Negative for visual disturbance. Respiratory: Negative for cough and shortness of breath. Cardiovascular: Negative for chest pain and leg swelling. Gastrointestinal: Negative for abdominal pain, nausea and vomiting. Genitourinary: Negative. Musculoskeletal: Negative for back pain and gait problem. Skin: Negative for color change and rash. Neurological: Negative for dizziness, weakness, light-headedness and headaches. Hematological: Does not bruise/bleed easily. All other systems reviewed and are negative. Physical Exam   Physical Exam  Vitals signs and nursing note reviewed. Constitutional:       General: She is not in acute distress. Appearance: Normal appearance. She is not ill-appearing or toxic-appearing. HENT:      Head: Normocephalic and atraumatic. Nose: Nose normal.      Mouth/Throat:      Mouth: Mucous membranes are moist.   Eyes:      Extraocular Movements: Extraocular movements intact. Pupils: Pupils are equal, round, and reactive to light. Neck:      Musculoskeletal: Normal range of motion and neck supple. Cardiovascular:      Rate and Rhythm: Normal rate and regular rhythm. Heart sounds: No murmur. Pulmonary:      Effort: Pulmonary effort is normal. No respiratory distress. Breath sounds: Normal breath sounds. No wheezing. Abdominal:      General: There is no distension. Palpations: Abdomen is soft. Tenderness: There is no abdominal tenderness. There is no guarding or rebound. Musculoskeletal: Normal range of motion. General: No swelling or tenderness. Right lower leg: No edema. Left lower leg: No edema. Skin:     General: Skin is warm and dry. Coloration: Skin is not pale.       Findings: No erythema. Neurological:      General: No focal deficit present. Mental Status: She is alert and oriented to person, place, and time. Diagnostic Study Results     Labs -  Labs Reviewed   CULTURE, URINE - Abnormal; Notable for the following components:       Result Value    Culture result: KLEBSIELLA PNEUMONIAE SENSITIVITY TO FOLLOW (*)     All other components within normal limits   CBC WITH AUTOMATED DIFF - Abnormal; Notable for the following components:    NEUTROPHILS 84 (*)     LYMPHOCYTES 8 (*)     ABS. LYMPHOCYTES 0.5 (*)     All other components within normal limits   METABOLIC PANEL, COMPREHENSIVE - Abnormal; Notable for the following components:    Potassium 3.1 (*)     Glucose 248 (*)     Alk.  phosphatase 142 (*)     Globulin 4.2 (*)     A-G Ratio 0.9 (*)     All other components within normal limits   URINALYSIS W/ REFLEX CULTURE - Abnormal; Notable for the following components:    Appearance CLOUDY (*)     Protein 30 (*)     Glucose >1,000 (*)     Blood TRACE (*)     Nitrites Positive (*)     Leukocyte Esterase MODERATE (*)     Epithelial cells MODERATE (*)     Bacteria 4+ (*)     UA:UC IF INDICATED URINE CULTURE ORDERED (*)     All other components within normal limits   GLUCOSE, POC - Abnormal; Notable for the following components:    Glucose (POC) 123 (*)     All other components within normal limits   GLUCOSE, POC - Abnormal; Notable for the following components:    Glucose (POC) 485 (*)     All other components within normal limits   GLUCOSE, POC - Abnormal; Notable for the following components:    Glucose (POC) 322 (*)     All other components within normal limits   GLUCOSE, POC - Abnormal; Notable for the following components:    Glucose (POC) 222 (*)     All other components within normal limits   GLUCOSE, POC - Abnormal; Notable for the following components:    Glucose (POC) 131 (*)     All other components within normal limits   GLUCOSE, POC - Abnormal; Notable for the following components:    Glucose (POC) 154 (*)     All other components within normal limits       Radiologic Studies -   No orders to display     CT Results  (Last 48 hours)    None        CXR Results  (Last 48 hours)    None          Medical Decision Making   I am the first provider for this patient. I reviewed the vital signs, available nursing notes, past medical history, past surgical history, family history and social history. Vital Signs-Reviewed the patient's vital signs. Visit Vitals  /86   Pulse 70   Temp 98.2 °F (36.8 °C)   Resp 16   Ht 5' 3\" (1.6 m)   Wt 63.3 kg (139 lb 8.8 oz)   SpO2 100%   BMI 24.72 kg/m²       Records Reviewed: Nursing Notes, Old Medical Records, Previous Radiology Studies and Previous Laboratory Studies  I personally reviewed ED provider notes from 6/16/2020. Provider Notes (Medical Decision Making): This is a 61-year-old female with labile blood glucose levels with multiple ED encounters for hypoglycemic episodes as well as hyperglycemia resulting in DKA. She is here today after she skipped a meal last night resulting in hypoglycemia upon awakening this morning. This was resolved prior to arrival.  She is now at her baseline with normal blood glucose levels. She is tolerating binh crackers, peanut butter, orange juice in the ED. Vital signs are stable and she appears clinically well and nontoxic. She has no complaints. She was monitored for approximately 5 hours in the ED and blood glucose was carefully monitored, it did spike after administration of glucagon and oral glucose by EMS but gradually came down and settled in the low 100s range. Patient does feel comfortable with discharge home to follow-up with PCP. She is strongly encouraged to not miss meals and to not take insulin if she is going to skip meals. She is found to have a urinary tract infection and will be provided p.o. antibiotics to take as an outpatient.   I do not appreciate any signs of sepsis or toxicity on her vital signs or blood work today. All questions answered and she agrees plan as above. ED Course:   Initial assessment performed. The patients presenting problems have been discussed, and they are in agreement with the care plan formulated and outlined with them. I have encouraged them to ask questions as they arise throughout their visit. Discharge Note:  The patient has been re-evaluated and is ready for discharge. Reviewed available results with patient. Counseled patient on diagnosis and care plan. Patient has expressed understanding, and all questions have been answered. Patient agrees with plan and agrees to follow up as recommended, or to return to the ED if their symptoms worsen. Discharge instructions have been provided and explained to the patient, along with reasons to return to the ED. Disposition:  Discharge home      DISCHARGE PLAN:  1. Discharge Medication List as of 6/18/2020 11:58 AM      CONTINUE these medications which have CHANGED    Details   cephALEXin (Keflex) 500 mg capsule Take 1 Cap by mouth two (2) times a day for 7 days. , Normal, Disp-14 Cap, R-0         CONTINUE these medications which have NOT CHANGED    Details   metoprolol succinate (TOPROL-XL) 25 mg XL tablet TAKE 1 TABLET BY MOUTH EVERY DAY, Normal, Disp-90 Tab, R-3      insulin degludec Marissa Gomes FlexTouch U-100) 100 unit/mL (3 mL) inpn 18 Units by SubCUTAneous route daily. , No Print, Disp-2 Adjustable Dose Pre-filled Pen Syringe, R-11      pravastatin (PRAVACHOL) 80 mg tablet TAKE 1 TABLET BY MOUTH at bedtime, Normal, Disp-90 Tab, R-3      levothyroxine (SYNTHROID) 175 mcg tablet TAKE 1 TABLET BY MOUTH EVERY DAY, Normal, Disp-90 Tab, R-3      clopidogreL (PLAVIX) 75 mg tab TAKE 1 TABLET BY MOUTH EVERY DAY, Normal, Disp-90 Tab, R-3      amLODIPine (NORVASC) 10 mg tablet TAKE 1 TABLET BY MOUTH EVERY DAY IN THE MORNING, Normal, Disp-90 Tab, R-3      flash glucose scanning reader (FREESTYLE KEVON 14 DAY READER) misc As directed1, Normal, Disp-1 Each, R-5      flash glucose sensor (FREESTYLE KEVON 14 DAY SENSOR) kit Continuous monitoring, Normal, Disp-2 Kit, R-11      glucose blood VI test strips (ONETOUCH ULTRA BLUE TEST STRIP) strip USE TO CHECK BLOOD SUGARS DAILY. Dx: E11.649, Normal, Disp-100 Strip, R-11      Blood-Glucose Meter (ONETOUCH ULTRA2 METER) misc USE TO CHECK BLOOD SUGARS DAILY., Normal, Disp-1 Each, R-0      insulin aspart U-100 (NOVOLOG) 100 unit/mL (3 mL) inpn 4 units AC breakfast,lunch, and dinner, Normal, Disp-1 Adjustable Dose Pre-filled Pen Syringe, R-11      brimonidine (ALPHAGAN) 0.15 % ophthalmic solution Administer 1 Drop to both eyes two (2) times a day., Historical Med           2. Follow-up Information     Follow up With Specialties Details Why Contact Info    Rima Hurd MD Internal Medicine Schedule an appointment as soon as possible for a visit   [place] [address]  [place] [address] [phone]          3. Return to ED if worse     Diagnosis     Clinical Impression:   1. Hypoglycemia    2. Acute cystitis without hematuria        Attestations:    Alex Hills MD    Please note that this dictation was completed with [employer], the computer voice recognition software. Quite often unanticipated grammatical, syntax, homophones, and other interpretive errors are inadvertently transcribed by the computer software. Please disregard these errors. Please excuse any errors that have escaped final proofreading. Thank you.

## 2020-06-18 NOTE — ED NOTES
Bedside shift change report given to [address] [place] [address] (oncoming nurse) by Anabel Astudillo (offgoing nurse). Report included the following information SBAR, ED Summary, MAR and Recent Results.

## 2020-06-18 NOTE — ED NOTES
Bedside and Verbal shift change report given to Chasidy (oncoming nurse) by Nigel Bone (offgoing nurse). Report included the following information SBAR, ED Summary, MAR and Recent Results.

## 2020-06-18 NOTE — ED NOTES
Dr. Jimmy Mehta and Debbie Walton RN reviewed discharge instructions with the patient. The patient verbalized understanding. All questions and concerns were addressed. The patient was taken out of the department in a wheelchair and is discharged ambulatory waiting for her ride with instructions and prescriptions in hand. Pt is alert and oriented x 4. Respirations are clear and unlabored.

## 2020-06-18 NOTE — DISCHARGE INSTRUCTIONS
You were evaluated in the emergency department for low blood glucose. Your examination was reassuring as was your work-up including blood work. You do have a UTI, please take the antibiotic prescribed until completion. It will be important for you to follow-up with your primary care physician in 2-3 days. If you develop worsening symptoms such as persistent low blood sugar levels or fevers, nausea, vomiting, abdominal pain, or inability to eat or drink, please return to the emergency department immediately.

## 2020-06-18 NOTE — ED NOTES
Pt arrives EMS from home. Per son, Pt woke up screaming for help when son got to pt, pt was \"out of it\". Son called EMS and BG was 35 upon arrival. EMS administered 1 mg glucagon IM left deltoid and 15 oral glucose. Per EMS recheck of BG was 105. Arrival BG in ED was 123. Pt denies any s/sx upon arrival to ED. Pt does not remember what happened this morning but is now A/O. Pt states she did not eat dinner last night due to falling asleep. Pt has DM type 2. Pt is a/o x4. Cardiac monitor x3. Call bell within reach. Crackers oj and peanut btr given to pt.

## 2020-06-19 ENCOUNTER — PATIENT OUTREACH (OUTPATIENT)
Dept: FAMILY MEDICINE CLINIC | Age: 80
End: 2020-06-19

## 2020-06-19 PROCEDURE — 3331090002 HH PPS REVENUE DEBIT

## 2020-06-19 PROCEDURE — 3331090001 HH PPS REVENUE CREDIT

## 2020-06-19 NOTE — PROGRESS NOTES
Patient contacted regarding recent discharge and COVID-19 risk. Discussed COVID-19 related testing which was not done at this time. Test results were not done. Patient informed of results, if available? no    Care Transition Nurse/ Ambulatory Care Manager contacted the patient by telephone to perform post discharge assessment. Verified name and  with patient as identifiers. Patient has following risk factors of: heart failure and diabetes. CTN/ACM reviewed discharge instructions, medical action plan and red flags related to discharge diagnosis. Reviewed and educated them on any new and changed medications related to discharge diagnosis. Advised obtaining a 90-day supply of all daily and as-needed medications. Education provided regarding infection prevention, and signs and symptoms of COVID-19 and when to seek medical attention with patient who verbalized understanding. Discussed exposure protocols and quarantine from [address] you at higher risk for severe illness  and given an opportunity for questions and concerns. The patient agrees to contact the COVID-19 hotline [phone] or PCP office for questions related to their healthcare. CTN/ACM provided contact information for future reference. From CDC: Are you at higher risk for severe illness?  Wash your hands often.  Avoid close contact (6 feet, which is about two arm lengths) with people who are sick.  Put distance between yourself and other people if COVID-19 is spreading in your community.  Clean and disinfect frequently touched surfaces.  Avoid all cruise travel and non-essential air travel.  Call your healthcare professional if you have concerns about COVID-19 and your underlying condition or if you are sick.     For more information on steps you can take to protect yourself, see CDC's How to Protect Yourself      Patient/family/caregiver given information for Shanta Santana and agrees to enroll no      Plan for follow-up call in 7-14 days based on severity of symptoms and risk factors. Patient was unaware of antibiotic that was called into [facility]. Confirmed this with patient and she plans to pick it up today. Discussed s/s UTI to look for.

## 2020-06-20 LAB
BACTERIA SPEC CULT: ABNORMAL
CC UR VC: ABNORMAL
SERVICE CMNT-IMP: ABNORMAL

## 2020-06-20 PROCEDURE — 3331090002 HH PPS REVENUE DEBIT

## 2020-06-20 PROCEDURE — 3331090001 HH PPS REVENUE CREDIT

## 2020-06-21 ENCOUNTER — APPOINTMENT (OUTPATIENT)
Dept: ULTRASOUND IMAGING | Age: 80
End: 2020-06-21
Attending: FAMILY MEDICINE
Payer: MEDICARE

## 2020-06-21 ENCOUNTER — APPOINTMENT (OUTPATIENT)
Dept: CT IMAGING | Age: 80
End: 2020-06-21
Attending: FAMILY MEDICINE
Payer: MEDICARE

## 2020-06-21 ENCOUNTER — HOSPITAL ENCOUNTER (OUTPATIENT)
Age: 80
Setting detail: OBSERVATION
Discharge: HOME | End: 2020-06-25
Attending: EMERGENCY MEDICINE | Admitting: FAMILY MEDICINE
Payer: MEDICARE

## 2020-06-21 DIAGNOSIS — E16.2 HYPOGLYCEMIA: Primary | ICD-10-CM

## 2020-06-21 DIAGNOSIS — N30.00 ACUTE CYSTITIS WITHOUT HEMATURIA: ICD-10-CM

## 2020-06-21 LAB
ALBUMIN SERPL-MCNC: 3.1 G/DL (ref 3.5–5)
ALBUMIN/GLOB SERPL: 0.9 {RATIO} (ref 1.1–2.2)
ALP SERPL-CCNC: 120 U/L (ref 45–117)
ALT SERPL-CCNC: 46 U/L (ref 12–78)
ANION GAP SERPL CALC-SCNC: 8 MMOL/L (ref 5–15)
APPEARANCE UR: CLEAR
AST SERPL-CCNC: 47 U/L (ref 15–37)
ATRIAL RATE: 94 BPM
BACTERIA URNS QL MICRO: ABNORMAL /HPF
BASOPHILS # BLD: 0 K/UL (ref 0–0.1)
BASOPHILS NFR BLD: 0 % (ref 0–1)
BILIRUB SERPL-MCNC: 0.3 MG/DL (ref 0.2–1)
BILIRUB UR QL: NEGATIVE
BUN SERPL-MCNC: 14 MG/DL (ref 6–20)
BUN/CREAT SERPL: 20 (ref 12–20)
CALCIUM SERPL-MCNC: 8.2 MG/DL (ref 8.5–10.1)
CALCULATED P AXIS, ECG09: 81 DEGREES
CALCULATED R AXIS, ECG10: -43 DEGREES
CALCULATED T AXIS, ECG11: -9 DEGREES
CHLORIDE SERPL-SCNC: 104 MMOL/L (ref 97–108)
CHOLEST SERPL-MCNC: 167 MG/DL
CK SERPL-CCNC: 53 U/L (ref 26–192)
CO2 SERPL-SCNC: 26 MMOL/L (ref 21–32)
COLOR UR: ABNORMAL
CREAT SERPL-MCNC: 0.71 MG/DL (ref 0.55–1.02)
DIAGNOSIS, 93000: NORMAL
DIFFERENTIAL METHOD BLD: ABNORMAL
EOSINOPHIL # BLD: 0.1 K/UL (ref 0–0.4)
EOSINOPHIL NFR BLD: 2 % (ref 0–7)
EPITH CASTS URNS QL MICRO: ABNORMAL /LPF
ERYTHROCYTE [DISTWIDTH] IN BLOOD BY AUTOMATED COUNT: 13.9 % (ref 11.5–14.5)
EST. AVERAGE GLUCOSE BLD GHB EST-MCNC: 263 MG/DL
GLOBULIN SER CALC-MCNC: 3.6 G/DL (ref 2–4)
GLUCOSE BLD STRIP.AUTO-MCNC: 139 MG/DL (ref 65–100)
GLUCOSE BLD STRIP.AUTO-MCNC: 182 MG/DL (ref 65–100)
GLUCOSE BLD STRIP.AUTO-MCNC: 314 MG/DL (ref 65–100)
GLUCOSE BLD STRIP.AUTO-MCNC: 324 MG/DL (ref 65–100)
GLUCOSE BLD STRIP.AUTO-MCNC: 79 MG/DL (ref 65–100)
GLUCOSE SERPL-MCNC: 91 MG/DL (ref 65–100)
GLUCOSE UR STRIP.AUTO-MCNC: NEGATIVE MG/DL
HBA1C MFR BLD: 10.8 % (ref 4–5.6)
HCT VFR BLD AUTO: 39.7 % (ref 35–47)
HDLC SERPL-MCNC: 96 MG/DL
HDLC SERPL: 1.7 {RATIO} (ref 0–5)
HGB BLD-MCNC: 13.2 G/DL (ref 11.5–16)
HGB UR QL STRIP: NEGATIVE
HYALINE CASTS URNS QL MICRO: ABNORMAL /LPF (ref 0–5)
IMM GRANULOCYTES # BLD AUTO: 0 K/UL (ref 0–0.04)
IMM GRANULOCYTES NFR BLD AUTO: 1 % (ref 0–0.5)
KETONES UR QL STRIP.AUTO: NEGATIVE MG/DL
LDLC SERPL CALC-MCNC: 55.6 MG/DL (ref 0–100)
LEUKOCYTE ESTERASE UR QL STRIP.AUTO: ABNORMAL
LIPID PROFILE,FLP: NORMAL
LYMPHOCYTES # BLD: 0.8 K/UL (ref 0.8–3.5)
LYMPHOCYTES NFR BLD: 19 % (ref 12–49)
MAGNESIUM SERPL-MCNC: 1.9 MG/DL (ref 1.6–2.4)
MCH RBC QN AUTO: 31.5 PG (ref 26–34)
MCHC RBC AUTO-ENTMCNC: 33.2 G/DL (ref 30–36.5)
MCV RBC AUTO: 94.7 FL (ref 80–99)
MONOCYTES # BLD: 0.4 K/UL (ref 0–1)
MONOCYTES NFR BLD: 8 % (ref 5–13)
NEUTS SEG # BLD: 3.2 K/UL (ref 1.8–8)
NEUTS SEG NFR BLD: 70 % (ref 32–75)
NITRITE UR QL STRIP.AUTO: POSITIVE
NRBC # BLD: 0 K/UL (ref 0–0.01)
NRBC BLD-RTO: 0 PER 100 WBC
P-R INTERVAL, ECG05: 180 MS
PH UR STRIP: 7 [PH] (ref 5–8)
PHOSPHATE SERPL-MCNC: 4 MG/DL (ref 2.6–4.7)
PLATELET # BLD AUTO: 264 K/UL (ref 150–400)
PMV BLD AUTO: 10.2 FL (ref 8.9–12.9)
POTASSIUM SERPL-SCNC: 4 MMOL/L (ref 3.5–5.1)
PROT SERPL-MCNC: 6.7 G/DL (ref 6.4–8.2)
PROT UR STRIP-MCNC: NEGATIVE MG/DL
Q-T INTERVAL, ECG07: 384 MS
QRS DURATION, ECG06: 88 MS
QTC CALCULATION (BEZET), ECG08: 480 MS
RBC # BLD AUTO: 4.19 M/UL (ref 3.8–5.2)
RBC #/AREA URNS HPF: ABNORMAL /HPF (ref 0–5)
SERVICE CMNT-IMP: ABNORMAL
SERVICE CMNT-IMP: NORMAL
SODIUM SERPL-SCNC: 138 MMOL/L (ref 136–145)
SP GR UR REFRACTOMETRY: 1.01 (ref 1–1.03)
TRIGL SERPL-MCNC: 77 MG/DL (ref ?–150)
TROPONIN I SERPL-MCNC: <0.05 NG/ML
TSH SERPL DL<=0.05 MIU/L-ACNC: 1.65 UIU/ML (ref 0.36–3.74)
UA: UC IF INDICATED,UAUC: ABNORMAL
UROBILINOGEN UR QL STRIP.AUTO: 0.2 EU/DL (ref 0.2–1)
VENTRICULAR RATE, ECG03: 94 BPM
VIT B12 SERPL-MCNC: 623 PG/ML (ref 193–986)
VLDLC SERPL CALC-MCNC: 15.4 MG/DL
WBC # BLD AUTO: 4.5 K/UL (ref 3.6–11)
WBC URNS QL MICRO: ABNORMAL /HPF (ref 0–4)

## 2020-06-21 PROCEDURE — 74011250637 HC RX REV CODE- 250/637: Performed by: FAMILY MEDICINE

## 2020-06-21 PROCEDURE — 80061 LIPID PANEL: CPT

## 2020-06-21 PROCEDURE — 96372 THER/PROPH/DIAG INJ SC/IM: CPT

## 2020-06-21 PROCEDURE — 74011250636 HC RX REV CODE- 250/636: Performed by: FAMILY MEDICINE

## 2020-06-21 PROCEDURE — 36415 COLL VENOUS BLD VENIPUNCTURE: CPT

## 2020-06-21 PROCEDURE — 93005 ELECTROCARDIOGRAM TRACING: CPT

## 2020-06-21 PROCEDURE — 3331090002 HH PPS REVENUE DEBIT

## 2020-06-21 PROCEDURE — 74011636637 HC RX REV CODE- 636/637: Performed by: FAMILY MEDICINE

## 2020-06-21 PROCEDURE — 84484 ASSAY OF TROPONIN QUANT: CPT

## 2020-06-21 PROCEDURE — 3331090001 HH PPS REVENUE CREDIT

## 2020-06-21 PROCEDURE — 70450 CT HEAD/BRAIN W/O DYE: CPT

## 2020-06-21 PROCEDURE — 99218 HC RM OBSERVATION: CPT

## 2020-06-21 PROCEDURE — 82607 VITAMIN B-12: CPT

## 2020-06-21 PROCEDURE — 83735 ASSAY OF MAGNESIUM: CPT

## 2020-06-21 PROCEDURE — 80053 COMPREHEN METABOLIC PANEL: CPT

## 2020-06-21 PROCEDURE — 83036 HEMOGLOBIN GLYCOSYLATED A1C: CPT

## 2020-06-21 PROCEDURE — 84100 ASSAY OF PHOSPHORUS: CPT

## 2020-06-21 PROCEDURE — 74011000258 HC RX REV CODE- 258: Performed by: EMERGENCY MEDICINE

## 2020-06-21 PROCEDURE — 81001 URINALYSIS AUTO W/SCOPE: CPT

## 2020-06-21 PROCEDURE — 87040 BLOOD CULTURE FOR BACTERIA: CPT

## 2020-06-21 PROCEDURE — 82550 ASSAY OF CK (CPK): CPT

## 2020-06-21 PROCEDURE — 74011000258 HC RX REV CODE- 258: Performed by: FAMILY MEDICINE

## 2020-06-21 PROCEDURE — 94760 N-INVAS EAR/PLS OXIMETRY 1: CPT

## 2020-06-21 PROCEDURE — 99285 EMERGENCY DEPT VISIT HI MDM: CPT

## 2020-06-21 PROCEDURE — 74011250636 HC RX REV CODE- 250/636: Performed by: EMERGENCY MEDICINE

## 2020-06-21 PROCEDURE — 85025 COMPLETE CBC W/AUTO DIFF WBC: CPT

## 2020-06-21 PROCEDURE — 82962 GLUCOSE BLOOD TEST: CPT

## 2020-06-21 PROCEDURE — 84443 ASSAY THYROID STIM HORMONE: CPT

## 2020-06-21 PROCEDURE — 74011000250 HC RX REV CODE- 250: Performed by: FAMILY MEDICINE

## 2020-06-21 PROCEDURE — 76705 ECHO EXAM OF ABDOMEN: CPT

## 2020-06-21 RX ORDER — ONDANSETRON 2 MG/ML
4 INJECTION INTRAMUSCULAR; INTRAVENOUS
Status: DISCONTINUED | OUTPATIENT
Start: 2020-06-21 | End: 2020-06-25 | Stop reason: HOSPADM

## 2020-06-21 RX ORDER — BRIMONIDINE TARTRATE 2 MG/ML
1 SOLUTION/ DROPS OPHTHALMIC 2 TIMES DAILY
Status: DISCONTINUED | OUTPATIENT
Start: 2020-06-21 | End: 2020-06-25 | Stop reason: HOSPADM

## 2020-06-21 RX ORDER — SODIUM CHLORIDE 0.9 % (FLUSH) 0.9 %
5-40 SYRINGE (ML) INJECTION EVERY 8 HOURS
Status: DISCONTINUED | OUTPATIENT
Start: 2020-06-21 | End: 2020-06-25 | Stop reason: HOSPADM

## 2020-06-21 RX ORDER — DEXTROSE 50 % IN WATER (D50W) INTRAVENOUS SYRINGE
25-50 AS NEEDED
Status: DISCONTINUED | OUTPATIENT
Start: 2020-06-21 | End: 2020-06-25 | Stop reason: HOSPADM

## 2020-06-21 RX ORDER — HEPARIN SODIUM 5000 [USP'U]/ML
5000 INJECTION, SOLUTION INTRAVENOUS; SUBCUTANEOUS EVERY 8 HOURS
Status: DISCONTINUED | OUTPATIENT
Start: 2020-06-21 | End: 2020-06-25 | Stop reason: HOSPADM

## 2020-06-21 RX ORDER — PRAVASTATIN SODIUM 40 MG/1
80 TABLET ORAL
Status: DISCONTINUED | OUTPATIENT
Start: 2020-06-21 | End: 2020-06-25 | Stop reason: HOSPADM

## 2020-06-21 RX ORDER — CLOPIDOGREL BISULFATE 75 MG/1
75 TABLET ORAL DAILY
Status: DISCONTINUED | OUTPATIENT
Start: 2020-06-22 | End: 2020-06-25 | Stop reason: HOSPADM

## 2020-06-21 RX ORDER — INSULIN LISPRO 100 [IU]/ML
INJECTION, SOLUTION INTRAVENOUS; SUBCUTANEOUS
Status: DISCONTINUED | OUTPATIENT
Start: 2020-06-21 | End: 2020-06-22

## 2020-06-21 RX ORDER — METOPROLOL SUCCINATE 25 MG/1
25 TABLET, EXTENDED RELEASE ORAL DAILY
Status: DISCONTINUED | OUTPATIENT
Start: 2020-06-22 | End: 2020-06-25 | Stop reason: HOSPADM

## 2020-06-21 RX ORDER — ACETAMINOPHEN 325 MG/1
650 TABLET ORAL
Status: DISCONTINUED | OUTPATIENT
Start: 2020-06-21 | End: 2020-06-25 | Stop reason: HOSPADM

## 2020-06-21 RX ORDER — SODIUM CHLORIDE 0.9 % (FLUSH) 0.9 %
5-40 SYRINGE (ML) INJECTION AS NEEDED
Status: DISCONTINUED | OUTPATIENT
Start: 2020-06-21 | End: 2020-06-25 | Stop reason: HOSPADM

## 2020-06-21 RX ORDER — DEXTROSE MONOHYDRATE AND SODIUM CHLORIDE 5; .9 G/100ML; G/100ML
50 INJECTION, SOLUTION INTRAVENOUS CONTINUOUS
Status: DISCONTINUED | OUTPATIENT
Start: 2020-06-21 | End: 2020-06-22

## 2020-06-21 RX ORDER — AMLODIPINE BESYLATE 5 MG/1
10 TABLET ORAL DAILY
Status: DISCONTINUED | OUTPATIENT
Start: 2020-06-22 | End: 2020-06-25 | Stop reason: HOSPADM

## 2020-06-21 RX ORDER — MAGNESIUM SULFATE 100 %
4 CRYSTALS MISCELLANEOUS AS NEEDED
Status: DISCONTINUED | OUTPATIENT
Start: 2020-06-21 | End: 2020-06-25 | Stop reason: HOSPADM

## 2020-06-21 RX ADMIN — HEPARIN SODIUM 5000 UNITS: 5000 INJECTION INTRAVENOUS; SUBCUTANEOUS at 23:00

## 2020-06-21 RX ADMIN — BRIMONIDINE TARTRATE 1 DROP: 2 SOLUTION OPHTHALMIC at 20:57

## 2020-06-21 RX ADMIN — PRAVASTATIN SODIUM 80 MG: 40 TABLET ORAL at 21:00

## 2020-06-21 RX ADMIN — INSULIN LISPRO 4 UNITS: 100 INJECTION, SOLUTION INTRAVENOUS; SUBCUTANEOUS at 17:34

## 2020-06-21 RX ADMIN — CEFTRIAXONE 1 G: 1 INJECTION, POWDER, FOR SOLUTION INTRAMUSCULAR; INTRAVENOUS at 10:56

## 2020-06-21 RX ADMIN — Medication 10 ML: at 21:00

## 2020-06-21 RX ADMIN — Medication 10 ML: at 17:35

## 2020-06-21 RX ADMIN — DEXTROSE MONOHYDRATE AND SODIUM CHLORIDE 50 ML/HR: 5; .9 INJECTION, SOLUTION INTRAVENOUS at 17:37

## 2020-06-21 RX ADMIN — INSULIN LISPRO 2 UNITS: 100 INJECTION, SOLUTION INTRAVENOUS; SUBCUTANEOUS at 21:01

## 2020-06-21 RX ADMIN — HEPARIN SODIUM 5000 UNITS: 5000 INJECTION INTRAVENOUS; SUBCUTANEOUS at 17:35

## 2020-06-21 NOTE — ED PROVIDER NOTES
EMERGENCY DEPARTMENT HISTORY AND PHYSICAL EXAM      Date: 6/21/2020  Patient Name: Felicity Hawk  Patient Age and Sex: 67 y.o. female    History of Presenting Illness     Chief Complaint   Patient presents with    Low Blood Sugar     pt arrives via EMS for c/o low blood sugar at home. per EMS pts sons found her unresponsive and foaming at the mouth. EMS gave D10 en route, last glucose was 190s. pt awake and alert upon arrival. pt states she is unsure if she ate dinner. History Provided By: Patient    Ability to gather history was limited by:     HPI: Felicity Hawk, 67 y.o. female presents with symptomatic hypoglycemia. She was found at home unresponsive, foaming at the mouth, by her son. Blood sugar was reportedly in the 30s on EMS arrival, patient was given D10 and glucose came up to 190s. She has had 2 almost identical episodes within the last week, and this is her third visit to the emergency department this week. She often reports that she does not remember her last meal or what she ate for dinner last night. No fevers, no pain. Location:    Quality:      Severity:    Duration:   Timing:      Context:    Modifying factors:   Associated symptoms:       The patient's medical, surgical, family, and social history on file were reviewed by me today.       Past Medical History:   Diagnosis Date    Heart failure ([facility] [address] [address]     unknown to family    Hypertension     Stroke [facility])      Past Surgical History:   Procedure Laterality Date    HX GYN      HX HEENT      thyroidectomy    HX HYSTERECTOMY      REMOVAL GALLBLADDER      THYROIDECTOMY         PCP: Diego Hough MD    Past History     Past Medical History:  Past Medical History:   Diagnosis Date    Heart failure ([address]     unknown to family    Hypertension     Stroke [facility])        Past Surgical History:  Past Surgical History:   Procedure Laterality Date    HX GYN      HX HEENT      thyroidectomy    HX HYSTERECTOMY      REMOVAL GALLBLADDER      THYROIDECTOMY         Family History:  Family History   Problem Relation Age of Onset    Diabetes Father     No Known Problems Mother        Social History:  Social History     Tobacco Use    Smoking status: Never Smoker    Smokeless tobacco: Never Used   Substance Use Topics    Alcohol use: No     Comment: been years    Drug use: No       Allergies:  No Known Allergies    Current Medications:  No current facility-administered medications on file prior to encounter. Current Outpatient Medications on File Prior to Encounter   Medication Sig Dispense Refill    cephALEXin (Keflex) 500 mg capsule Take 1 Cap by mouth two (2) times a day for 7 days. 14 Cap 0    metoprolol succinate (TOPROL-XL) 25 mg XL tablet TAKE 1 TABLET BY MOUTH EVERY DAY 90 Tab 3    insulin degludec Tami Hilts FlexTouch U-100) 100 unit/mL (3 mL) inpn 18 Units by SubCUTAneous route daily. 2 Adjustable Dose Pre-filled Pen Syringe 11    pravastatin (PRAVACHOL) 80 mg tablet TAKE 1 TABLET BY MOUTH at bedtime 90 Tab 3    levothyroxine (SYNTHROID) 175 mcg tablet TAKE 1 TABLET BY MOUTH EVERY DAY 90 Tab 3    clopidogreL (PLAVIX) 75 mg tab TAKE 1 TABLET BY MOUTH EVERY DAY 90 Tab 3    amLODIPine (NORVASC) 10 mg tablet TAKE 1 TABLET BY MOUTH EVERY DAY IN THE MORNING 90 Tab 3    flash glucose scanning reader (FREESTYLE KEVON 14 DAY READER) [facility] As directed1 1 Each 5    flash glucose sensor (FREESTYLE KEVON 14 DAY SENSOR) kit Continuous monitoring 2 Kit 11    glucose blood VI test strips (ONETOUCH ULTRA BLUE TEST STRIP) strip USE TO CHECK BLOOD SUGARS DAILY. Dx: E11.649 100 Strip 11    Blood-Glucose Meter (ONETOUCH ULTRA2 METER) misc USE TO CHECK BLOOD SUGARS DAILY. 1 Each 0    insulin aspart U-100 (NOVOLOG) 100 unit/mL (3 mL) inpn 4 units AC breakfast,lunch, and dinner (Patient taking differently: 4 Units by SubCUTAneous route Before breakfast, lunch, and dinner.  4 units AC breakfast,lunch, and dinner) 1 Adjustable Dose Pre-filled Pen Syringe 11    brimonidine (ALPHAGAN) 0.15 % ophthalmic solution Administer 1 Drop to both eyes two (2) times a day. Review of Systems   Review of Systems   Constitutional: Negative for fatigue and fever. Respiratory: Negative for shortness of breath. Cardiovascular: Negative for chest pain. Gastrointestinal: Negative for abdominal pain. Neurological: Negative for headaches. Psychiatric/Behavioral: Positive for confusion. All other systems reviewed and are negative. Physical Exam   Vital Signs  Patient Vitals for the past 8 hrs:   BP SpO2   06/21/20 0700 138/83 100 %          Physical Exam  Vitals signs and nursing note reviewed. Constitutional:       General: She is not in acute distress. Appearance: Normal appearance. She is well-developed. She is not ill-appearing. HENT:      Head: Normocephalic and atraumatic. Mouth/Throat:      Mouth: Mucous membranes are moist.   Eyes:      General:         Right eye: No discharge. Left eye: No discharge. Conjunctiva/sclera: Conjunctivae normal.   Neck:      Musculoskeletal: Normal range of motion and neck supple. Cardiovascular:      Rate and Rhythm: Normal rate and regular rhythm. Heart sounds: Normal heart sounds. No murmur. Pulmonary:      Effort: Pulmonary effort is normal. No respiratory distress. Breath sounds: Normal breath sounds. No wheezing. Abdominal:      General: There is no distension. Palpations: Abdomen is soft. Tenderness: There is no abdominal tenderness. Musculoskeletal: Normal range of motion. General: No deformity. Skin:     General: Skin is warm and dry. Findings: No rash. Neurological:      General: No focal deficit present. Mental Status: She is alert. She is disoriented. Comments: Alert, seems somewhat confused, question dementia.   Oriented to person and place, not to year   Psychiatric:         Speech: Speech normal. Behavior: Behavior normal.         Cognition and Memory: Cognition normal.         Diagnostic Study Results   Labs  Recent Results (from the past 24 hour(s))   GLUCOSE, POC    Collection Time: 06/21/20  6:37 AM   Result Value Ref Range    Glucose (POC) 139 (H) 65 - 100 mg/dL    Performed by Freddie CANTRELL    CBC WITH AUTOMATED DIFF    Collection Time: 06/21/20  6:53 AM   Result Value Ref Range    WBC 4.5 3.6 - 11.0 K/uL    RBC 4.19 3.80 - 5.20 M/uL    HGB 13.2 11.5 - 16.0 g/dL    HCT 39.7 35.0 - 47.0 %    MCV 94.7 80.0 - 99.0 FL    MCH 31.5 26.0 - 34.0 PG    MCHC 33.2 30.0 - 36.5 g/dL    RDW 13.9 11.5 - 14.5 %    PLATELET 944 476 - 654 K/uL    MPV 10.2 8.9 - 12.9 FL    NRBC 0.0 0  WBC    ABSOLUTE NRBC 0.00 0.00 - 0.01 K/uL    NEUTROPHILS 70 32 - 75 %    LYMPHOCYTES 19 12 - 49 %    MONOCYTES 8 5 - 13 %    EOSINOPHILS 2 0 - 7 %    BASOPHILS 0 0 - 1 %    IMMATURE GRANULOCYTES 1 (H) 0.0 - 0.5 %    ABS. NEUTROPHILS 3.2 1.8 - 8.0 K/UL    ABS. LYMPHOCYTES 0.8 0.8 - 3.5 K/UL    ABS. MONOCYTES 0.4 0.0 - 1.0 K/UL    ABS. EOSINOPHILS 0.1 0.0 - 0.4 K/UL    ABS. BASOPHILS 0.0 0.0 - 0.1 K/UL    ABS. IMM.  GRANS. 0.0 0.00 - 0.04 K/UL    DF AUTOMATED     URINALYSIS W/ REFLEX CULTURE    Collection Time: 06/21/20  6:53 AM   Result Value Ref Range    Color YELLOW/STRAW      Appearance CLEAR CLEAR      Specific gravity 1.011 1.003 - 1.030      pH (UA) 7.0 5.0 - 8.0      Protein Negative NEG mg/dL    Glucose Negative NEG mg/dL    Ketone Negative NEG mg/dL    Bilirubin Negative NEG      Blood Negative NEG      Urobilinogen 0.2 0.2 - 1.0 EU/dL    Nitrites Positive (A) NEG      Leukocyte Esterase SMALL (A) NEG      WBC 5-10 0 - 4 /hpf    RBC 0-5 0 - 5 /hpf    Epithelial cells FEW FEW /lpf    Bacteria 4+ (A) NEG /hpf    UA:UC IF INDICATED CULTURE NOT INDICATED BY UA RESULT CNI      Hyaline cast 0-2 0 - 5 /lpf   METABOLIC PANEL, COMPREHENSIVE    Collection Time: 06/21/20  7:26 AM   Result Value Ref Range    Sodium 138 136 - 145 mmol/L    Potassium 4.0 3.5 - 5.1 mmol/L    Chloride 104 97 - 108 mmol/L    CO2 26 21 - 32 mmol/L    Anion gap 8 5 - 15 mmol/L    Glucose 91 65 - 100 mg/dL    BUN 14 6 - 20 MG/DL    Creatinine 0.71 0.55 - 1.02 MG/DL    BUN/Creatinine ratio 20 12 - 20      GFR est AA >60 >60 ml/min/1.73m2    GFR est non-AA >60 >60 ml/min/1.73m2    Calcium 8.2 (L) 8.5 - 10.1 MG/DL    Bilirubin, total 0.3 0.2 - 1.0 MG/DL    ALT (SGPT) 46 12 - 78 U/L    AST (SGOT) 47 (H) 15 - 37 U/L    Alk. phosphatase 120 (H) 45 - 117 U/L    Protein, total 6.7 6.4 - 8.2 g/dL    Albumin 3.1 (L) 3.5 - 5.0 g/dL    Globulin 3.6 2.0 - 4.0 g/dL    A-G Ratio 0.9 (L) 1.1 - 2.2     GLUCOSE, POC    Collection Time: 06/21/20  7:41 AM   Result Value Ref Range    Glucose (POC) 79 65 - 100 mg/dL    Performed by Alexandra Goncalves RN        Radiologic Studies  No orders to display     CT Results  (Last 48 hours)    None        CXR Results  (Last 48 hours)    None          Procedures   EKG  Date/Time: 6/21/2020 7:24 AM  Performed by: Elisha Torres MD  Authorized by: Elisha Torres MD     ECG reviewed by ED Physician in the absence of a cardiologist: yes    Interpretation:     Interpretation: non-specific    Rate:     ECG rate assessment: normal    Rhythm:     Rhythm: sinus rhythm    Ectopy:     Ectopy: none    QRS:     QRS axis:  Normal  ST segments:     ST segments:  Normal  T waves:     T waves: normal          Medical Decision Making     I reviewed the patient's most recent Emergency Dept notes and diagnostic tests  in formulating my MDM on today's visit. Provider Notes (Medical Decision Making):   69-year-old female presenting with her third episode of hypoglycemia in 1 week requiring a trip to the emergency department. Here in the emergency department she seems to be back to baseline, has no complaints. Recent UTI, currently taking antibiotics. Alert and oriented x2. Unclear if this is precisely her baseline although I suspect that it is.   No focal neurologic deficits. No seizing. Reassuring vital signs. Blood sugar upon ED arrival was 139. Patient was given juice and food to eat. We will check laboratories and urinalysis. Anya Coley MD  7:15 AM    Repeat fingerstick is 79. Will start patient on D10 infusion, admit to hospital for refractory hypoglycemia. I suspect that this is mostly secondary to incorrect insulin use and patient not eating, but may also be secondary to her apparent urinary tract infection. Patient was given ceftriaxone. Stable for admission to the floor. Ivanna Alvarado MD        Social History     Tobacco Use    Smoking status: Never Smoker    Smokeless tobacco: Never Used   Substance Use Topics    Alcohol use: No     Comment: been years    Drug use: No     Patient Vitals for the past 4 hrs:   BP SpO2   06/21/20 0700 138/83 100 %            Consults:      Medications Administered during ED course:  Medications   cefTRIAXone (ROCEPHIN) 1 g in 0.9% sodium chloride (MBP/ADV) 50 mL (has no administration in time range)          Current Discharge Medication List             Diagnosis and Disposition     Disposition:      Clinical Impression:   1. Hypoglycemia    2. Acute cystitis without hematuria        Attestation:  I personally performed the services described in this documentation on this date 6/21/2020 for patient Kavita Martinez. Anya Coley MD        I was the first provider for this patient on this visit. To the best of my ability I reviewed relevant prior medical records, electrocardiograms, laboratories, and radiologic studies. The patient's presenting problems were discussed, and the patient was in agreement with the care plan formulated and outlined with them. Anya Coley MD    Please note that this dictation was completed with Dragon voice recognition software.  Quite often unanticipated grammatical, syntax, homophones, and other interpretive errors are inadvertently transcribed by the computer software. Please disregard these errors and excuse any errors that have escaped final proofreading.

## 2020-06-21 NOTE — H&P
History & Physical    Primary Care Provider: Rima Hurd MD  Source of Information: Patient/patient's son/chart review    History of Presenting Illness:   Sonya Andrews is a 67 y.o. female who presents with recurrent hypoglycemia. Patient is a poor historian, history was obtained from the patient as well as patient's son Julio Benton. Afshin Harris reports that patient was found in her bed this morning at around 4 AM foaming at the mouth and much less responsive than usual.  Afshin Harris reports that patient has history of recurrent hypoglycemia and the family got concerned that she was hypoglycemic again and called EMS. Patient's blood glucose was reported in the 30s by EMS, patient was given D10 and and it came up to 130s. Patient was brought to the ER, patient blood glucose started to trend lower in the ER and patient was requested to be admitted to the hospitalist service. Patient has had multiple visits to [facility] ER for recurrent hypoglycemia. Patient was found to have a UTI on the last visit and was started on Keflex. Patient currently is resting in bed reports that she feels much better, and denies any other complaints or problems. Afshin Harris reported that patient has lost her set of dentures and it has been hard for her to eat. Patient is somewhat confused and per Afshin Harris that is baseline for the patient although patient has not been evaluated for dementia. The patient denies any Headache, blurry vision, sore throat, trouble swallowing, trouble with speech, chest pain, SOB, cough, fever, chills, N/V/D, abd pain, urinary symptoms, constipation, recent travels, sick contacts, focal or generalized oneirological symptoms,  falls, injuries, rashes, contact with COVID-19 diagnosed patients, hematemesis, melena, hemoptysis, hematuria, rashes, denies starting any new medications and denies any other concerns or problems besides as mentioned above.         Review of Systems:  A comprehensive review of systems was negative except for that written in the History of Present Illness. Past Medical History:   Diagnosis Date    Heart failure ([address]     unknown to family    Hypertension     Stroke [facility])       Past Surgical History:   Procedure Laterality Date    HX GYN      HX HEENT      thyroidectomy    HX HYSTERECTOMY      REMOVAL GALLBLADDER      THYROIDECTOMY       Prior to Admission medications    Medication Sig Start Date End Date Taking? Authorizing Provider   cephALEXin (Keflex) 500 mg capsule Take 1 Cap by mouth two (2) times a day for 7 days. 6/18/20 6/25/20  Carlos Pompa MD   metoprolol succinate (TOPROL-XL) 25 mg XL tablet TAKE 1 TABLET BY MOUTH EVERY DAY 5/11/20   Lee Crocker MD   insulin degludec Carlita Garth FlexTouch U-100) 100 unit/mL (3 mL) inpn 18 Units by SubCUTAneous route daily. 4/25/20   Lee Crocker MD   pravastatin (PRAVACHOL) 80 mg tablet TAKE 1 TABLET BY MOUTH at bedtime 2/27/20   Lee Crocker MD   levothyroxine (SYNTHROID) 175 mcg tablet TAKE 1 TABLET BY MOUTH EVERY DAY 2/27/20   Lee Crocker MD   clopidogreL (PLAVIX) 75 mg tab TAKE 1 TABLET BY MOUTH EVERY DAY 2/27/20   Howard Square, MD   amLODIPine (NORVASC) 10 mg tablet TAKE 1 TABLET BY MOUTH EVERY DAY IN THE MORNING 2/27/20   Howard Square, MD   flash glucose scanning reader (FREESTYLE KEVON 14 DAY READER) [facility] As directed1 2/27/20   Ohward Square, MD   flash glucose sensor (FREESTYLE KEVON 14 DAY SENSOR) kit Continuous monitoring 2/27/20   Howard Square, MD   glucose blood VI test strips (ONETOUCH ULTRA BLUE TEST STRIP) strip USE TO CHECK BLOOD SUGARS DAILY. Dx: E11.649 2/25/20   Howard Square, MD   Blood-Glucose Meter (ONETOUCH ULTRA2 METER) misc USE TO CHECK BLOOD SUGARS DAILY.  2/25/20   Howard Square, MD   insulin aspart U-100 (NOVOLOG) 100 unit/mL (3 mL) inpn 4 units AC breakfast,lunch, and dinner  Patient taking differently: 4 Units by SubCUTAneous route Before breakfast, lunch, and dinner. 4 units AC breakfast,lunch, and dinner 6/15/19   Princess Subramanian MD   brimonidine (ALPHAGAN) 0.15 % ophthalmic solution Administer 1 Drop to both eyes two (2) times a day. 1/30/15   Provider, Historical     No Known Allergies   Family History   Problem Relation Age of Onset    Diabetes Father     No Known Problems Mother         SOCIAL HISTORY:  Patient resides:  Independently    Assisted Living    SNF    With family care x      Smoking history:   None x   Former    Chronic      Alcohol history:   None x   Social    Chronic      Ambulates:   Independently    w/cane x   w/walker    w/wc    CODE STATUS:  DNR    Full x   Other      Objective:     Physical Exam:     Visit Vitals  /83   SpO2 100%           General : alert x 2, awake, no acute distress, resting in bed, pleasant female, appears to be stated age  [de-identified]: PEERL, EOMI, moist mucus membrane, TM clear  Neck: supple, no JVD, no meningeal signs  Chest: Clear to auscultation bilaterally   CVS: S1 S2 heard, Capillary refill less than 2 seconds  Abd: soft/mild tenderness in the right upper quadrant on palpation, García sign was equivocal, non distended, BS physiological,   Ext: no clubbing, no cyanosis, no edema, brisk 2+ DP pulses  Neuro/Psych: pleasant mood and affect, CN 2-12 grossly intact, sensory grossly within normal limit, Strength 5/5 in all extremities, DTR 1+ x 4  Ski: warm        EKG:   NA      Data Review:     Recent Days:  Recent Labs     06/21/20  0653   WBC 4.5   HGB 13.2   HCT 39.7        Recent Labs     06/21/20  0726      K 4.0      CO2 26   GLU 91   BUN 14   CREA 0.71   CA 8.2*   ALB 3.1*   ALT 46     No results for input(s): PH, PCO2, PO2, HCO3, FIO2 in the last 72 hours.     24 Hour Results:  Recent Results (from the past 24 hour(s))   GLUCOSE, POC    Collection Time: 06/21/20  6:37 AM   Result Value Ref Range    Glucose (POC) 139 (H) 65 - 100 mg/dL    Performed by Sage GONSALVESN    CBC WITH AUTOMATED DIFF    Collection Time: 06/21/20  6:53 AM   Result Value Ref Range    WBC 4.5 3.6 - 11.0 K/uL    RBC 4.19 3.80 - 5.20 M/uL    HGB 13.2 11.5 - 16.0 g/dL    HCT 39.7 35.0 - 47.0 %    MCV 94.7 80.0 - 99.0 FL    MCH 31.5 26.0 - 34.0 PG    MCHC 33.2 30.0 - 36.5 g/dL    RDW 13.9 11.5 - 14.5 %    PLATELET 034 280 - 503 K/uL    MPV 10.2 8.9 - 12.9 FL    NRBC 0.0 0  WBC    ABSOLUTE NRBC 0.00 0.00 - 0.01 K/uL    NEUTROPHILS 70 32 - 75 %    LYMPHOCYTES 19 12 - 49 %    MONOCYTES 8 5 - 13 %    EOSINOPHILS 2 0 - 7 %    BASOPHILS 0 0 - 1 %    IMMATURE GRANULOCYTES 1 (H) 0.0 - 0.5 %    ABS. NEUTROPHILS 3.2 1.8 - 8.0 K/UL    ABS. LYMPHOCYTES 0.8 0.8 - 3.5 K/UL    ABS. MONOCYTES 0.4 0.0 - 1.0 K/UL    ABS. EOSINOPHILS 0.1 0.0 - 0.4 K/UL    ABS. BASOPHILS 0.0 0.0 - 0.1 K/UL    ABS. IMM.  GRANS. 0.0 0.00 - 0.04 K/UL    DF AUTOMATED     URINALYSIS W/ REFLEX CULTURE    Collection Time: 06/21/20  6:53 AM   Result Value Ref Range    Color YELLOW/STRAW      Appearance CLEAR CLEAR      Specific gravity 1.011 1.003 - 1.030      pH (UA) 7.0 5.0 - 8.0      Protein Negative NEG mg/dL    Glucose Negative NEG mg/dL    Ketone Negative NEG mg/dL    Bilirubin Negative NEG      Blood Negative NEG      Urobilinogen 0.2 0.2 - 1.0 EU/dL    Nitrites Positive (A) NEG      Leukocyte Esterase SMALL (A) NEG      WBC 5-10 0 - 4 /hpf    RBC 0-5 0 - 5 /hpf    Epithelial cells FEW FEW /lpf    Bacteria 4+ (A) NEG /hpf    UA:UC IF INDICATED CULTURE NOT INDICATED BY UA RESULT CNI      Hyaline cast 0-2 0 - 5 /lpf   METABOLIC PANEL, COMPREHENSIVE    Collection Time: 06/21/20  7:26 AM   Result Value Ref Range    Sodium 138 136 - 145 mmol/L    Potassium 4.0 3.5 - 5.1 mmol/L    Chloride 104 97 - 108 mmol/L    CO2 26 21 - 32 mmol/L    Anion gap 8 5 - 15 mmol/L    Glucose 91 65 - 100 mg/dL    BUN 14 6 - 20 MG/DL    Creatinine 0.71 0.55 - 1.02 MG/DL    BUN/Creatinine ratio 20 12 - 20      GFR est AA >60 >60 ml/min/1.73m2    GFR est non-AA >60 >60 ml/min/1.73m2    Calcium 8.2 (L) 8.5 - 10.1 MG/DL    Bilirubin, total 0.3 0.2 - 1.0 MG/DL    ALT (SGPT) 46 12 - 78 U/L    AST (SGOT) 47 (H) 15 - 37 U/L    Alk. phosphatase 120 (H) 45 - 117 U/L    Protein, total 6.7 6.4 - 8.2 g/dL    Albumin 3.1 (L) 3.5 - 5.0 g/dL    Globulin 3.6 2.0 - 4.0 g/dL    A-G Ratio 0.9 (L) 1.1 - 2.2     GLUCOSE, POC    Collection Time: 06/21/20  7:41 AM   Result Value Ref Range    Glucose (POC) 79 65 - 100 mg/dL    Performed by Gina Hayes RN          Imaging:     Assessment/Plan      #1. Recurrent hypoglycemia: Patient will be observed on a telemetry bed, likely multifactorial, secondary to poor oral intake, underlying UTI, use of insulin. Start patient on low-dose D5 NS GTT, Accu-Cheks every 4 hours, hold scheduled insulin, on sliding scale NovoLog insulin, once blood glucose improves, will stop the D5 drip and optimize scheduled insulin dosage, diabetic education, encourage p.o. intake, further intervention per hospital course closely monitor. 2.  UTI: Urine culture obtained on 6/18/2020 growing greater than 100,000 colonies of Klebsiella pneumonia sensitive to ceftriaxone, continue IV ceftriaxone, supportive care close monitoring further intervention per hospital course. 3.  Dehydration: Patient appears to be dehydrated, gentle IV hydration, repeat labs in the morning, encourage p.o. intake. 4.  Altered mental status: Resolving, likely secondary to hypoglycemia, CT head pending, neurovascular checks, optimize blood glucose, if persists may consider further intervention and diagnostics, IV hydration, fall precautions, aspiration precautions, telemetry monitoring  5. Hypertension: Continue home meds and continue to monitor  6. Hyperlipidemia: Continue statin  7. Right upper quadrant pain on palpation: Mild elevation of LFTs, will get a right upper quadrant ultrasound, close monitoring  8.   Diabetes mellitus type 2: Patient currently hypoglycemic, hold scheduled insulin, sliding scale NovoLog insulin, diabetic diet, Accu-Cheks, supportive care and close monitoring          GI DVT prophylaxis: Patient will be on heparin prophylaxis    CODE STATUS: Full code             Signed By: Ezra Ramirez MD     June 21, 2020

## 2020-06-21 NOTE — ED NOTES
TRANSFER - OUT REPORT:    Verbal report given to Jaden Bazan Rn(name) on Marysol Rios  being transferred to [facility](unit) for routine progression of care       Report consisted of patients Situation, Background, Assessment and   Recommendations(SBAR). Information from the following report(s) SBAR, ED Summary, Intake/Output, MAR and Recent Results was reviewed with the receiving nurse. Lines:   Peripheral IV 06/21/20 Right Hand (Active)   Site Assessment Clean, dry, & intact 6/21/2020  7:01 AM   Phlebitis Assessment 0 6/21/2020  7:01 AM   Infiltration Assessment 0 6/21/2020  7:01 AM   Dressing Status Clean, dry, & intact 6/21/2020  7:01 AM   Hub Color/Line Status Pink 6/21/2020  7:01 AM   Action Taken Dressing changed 6/21/2020  7:01 AM        Opportunity for questions and clarification was provided.       Patient transported with:   [employer]

## 2020-06-21 NOTE — PROGRESS NOTES
Bedside and Verbal shift change report given to  (oncoming nurse) by Cyrus Rdz (offgoing nurse). Report included the following information SBAR, Kardex, Intake/Output and MAR.

## 2020-06-22 LAB
ALBUMIN SERPL-MCNC: 2.9 G/DL (ref 3.5–5)
ALBUMIN/GLOB SERPL: 0.9 {RATIO} (ref 1.1–2.2)
ALP SERPL-CCNC: 122 U/L (ref 45–117)
ALT SERPL-CCNC: 39 U/L (ref 12–78)
ANION GAP SERPL CALC-SCNC: 8 MMOL/L (ref 5–15)
AST SERPL-CCNC: 36 U/L (ref 15–37)
BASOPHILS # BLD: 0 K/UL (ref 0–0.1)
BASOPHILS NFR BLD: 0 % (ref 0–1)
BILIRUB SERPL-MCNC: 0.2 MG/DL (ref 0.2–1)
BUN SERPL-MCNC: 13 MG/DL (ref 6–20)
BUN/CREAT SERPL: 24 (ref 12–20)
CALCIUM SERPL-MCNC: 8.2 MG/DL (ref 8.5–10.1)
CHLORIDE SERPL-SCNC: 105 MMOL/L (ref 97–108)
CK SERPL-CCNC: 43 U/L (ref 26–192)
CK SERPL-CCNC: 46 U/L (ref 26–192)
CO2 SERPL-SCNC: 25 MMOL/L (ref 21–32)
CREAT SERPL-MCNC: 0.54 MG/DL (ref 0.55–1.02)
DIFFERENTIAL METHOD BLD: ABNORMAL
EOSINOPHIL # BLD: 0.1 K/UL (ref 0–0.4)
EOSINOPHIL NFR BLD: 2 % (ref 0–7)
ERYTHROCYTE [DISTWIDTH] IN BLOOD BY AUTOMATED COUNT: 13.8 % (ref 11.5–14.5)
GLOBULIN SER CALC-MCNC: 3.2 G/DL (ref 2–4)
GLUCOSE BLD STRIP.AUTO-MCNC: 147 MG/DL (ref 65–100)
GLUCOSE BLD STRIP.AUTO-MCNC: 213 MG/DL (ref 65–100)
GLUCOSE BLD STRIP.AUTO-MCNC: 280 MG/DL (ref 65–100)
GLUCOSE BLD STRIP.AUTO-MCNC: 438 MG/DL (ref 65–100)
GLUCOSE SERPL-MCNC: 114 MG/DL (ref 65–100)
HCT VFR BLD AUTO: 34.7 % (ref 35–47)
HGB BLD-MCNC: 11.5 G/DL (ref 11.5–16)
IMM GRANULOCYTES # BLD AUTO: 0 K/UL (ref 0–0.04)
IMM GRANULOCYTES NFR BLD AUTO: 0 % (ref 0–0.5)
LYMPHOCYTES # BLD: 1.3 K/UL (ref 0.8–3.5)
LYMPHOCYTES NFR BLD: 29 % (ref 12–49)
MCH RBC QN AUTO: 31.6 PG (ref 26–34)
MCHC RBC AUTO-ENTMCNC: 33.1 G/DL (ref 30–36.5)
MCV RBC AUTO: 95.3 FL (ref 80–99)
MONOCYTES # BLD: 0.5 K/UL (ref 0–1)
MONOCYTES NFR BLD: 10 % (ref 5–13)
NEUTS SEG # BLD: 2.6 K/UL (ref 1.8–8)
NEUTS SEG NFR BLD: 59 % (ref 32–75)
NRBC # BLD: 0 K/UL (ref 0–0.01)
NRBC BLD-RTO: 0 PER 100 WBC
PLATELET # BLD AUTO: 248 K/UL (ref 150–400)
PMV BLD AUTO: 9.8 FL (ref 8.9–12.9)
POTASSIUM SERPL-SCNC: 3.9 MMOL/L (ref 3.5–5.1)
PROT SERPL-MCNC: 6.1 G/DL (ref 6.4–8.2)
RBC # BLD AUTO: 3.64 M/UL (ref 3.8–5.2)
SERVICE CMNT-IMP: ABNORMAL
SODIUM SERPL-SCNC: 138 MMOL/L (ref 136–145)
TROPONIN I SERPL-MCNC: <0.05 NG/ML
WBC # BLD AUTO: 4.5 K/UL (ref 3.6–11)

## 2020-06-22 PROCEDURE — 94760 N-INVAS EAR/PLS OXIMETRY 1: CPT

## 2020-06-22 PROCEDURE — 3331090002 HH PPS REVENUE DEBIT

## 2020-06-22 PROCEDURE — 96372 THER/PROPH/DIAG INJ SC/IM: CPT

## 2020-06-22 PROCEDURE — 74011250637 HC RX REV CODE- 250/637: Performed by: FAMILY MEDICINE

## 2020-06-22 PROCEDURE — 80053 COMPREHEN METABOLIC PANEL: CPT

## 2020-06-22 PROCEDURE — 84484 ASSAY OF TROPONIN QUANT: CPT

## 2020-06-22 PROCEDURE — 74011250636 HC RX REV CODE- 250/636: Performed by: FAMILY MEDICINE

## 2020-06-22 PROCEDURE — 3331090001 HH PPS REVENUE CREDIT

## 2020-06-22 PROCEDURE — 82550 ASSAY OF CK (CPK): CPT

## 2020-06-22 PROCEDURE — 96374 THER/PROPH/DIAG INJ IV PUSH: CPT

## 2020-06-22 PROCEDURE — 74011636637 HC RX REV CODE- 636/637: Performed by: INTERNAL MEDICINE

## 2020-06-22 PROCEDURE — 74011000258 HC RX REV CODE- 258: Performed by: INTERNAL MEDICINE

## 2020-06-22 PROCEDURE — 85025 COMPLETE CBC W/AUTO DIFF WBC: CPT

## 2020-06-22 PROCEDURE — 74011250636 HC RX REV CODE- 250/636: Performed by: INTERNAL MEDICINE

## 2020-06-22 PROCEDURE — 36415 COLL VENOUS BLD VENIPUNCTURE: CPT

## 2020-06-22 PROCEDURE — 82962 GLUCOSE BLOOD TEST: CPT

## 2020-06-22 PROCEDURE — 94761 N-INVAS EAR/PLS OXIMETRY MLT: CPT

## 2020-06-22 PROCEDURE — 99218 HC RM OBSERVATION: CPT

## 2020-06-22 PROCEDURE — 74011000258 HC RX REV CODE- 258: Performed by: FAMILY MEDICINE

## 2020-06-22 RX ORDER — INSULIN GLARGINE 100 [IU]/ML
4 INJECTION, SOLUTION SUBCUTANEOUS DAILY
Status: DISCONTINUED | OUTPATIENT
Start: 2020-06-22 | End: 2020-06-23

## 2020-06-22 RX ORDER — INSULIN LISPRO 100 [IU]/ML
4 INJECTION, SOLUTION INTRAVENOUS; SUBCUTANEOUS
Status: DISCONTINUED | OUTPATIENT
Start: 2020-06-22 | End: 2020-06-24

## 2020-06-22 RX ADMIN — CLOPIDOGREL BISULFATE 75 MG: 75 TABLET ORAL at 10:10

## 2020-06-22 RX ADMIN — INSULIN GLARGINE 4 UNITS: 100 INJECTION, SOLUTION SUBCUTANEOUS at 10:05

## 2020-06-22 RX ADMIN — INSULIN LISPRO 4 UNITS: 100 INJECTION, SOLUTION INTRAVENOUS; SUBCUTANEOUS at 17:30

## 2020-06-22 RX ADMIN — BRIMONIDINE TARTRATE 1 DROP: 2 SOLUTION OPHTHALMIC at 21:06

## 2020-06-22 RX ADMIN — Medication 10 ML: at 05:00

## 2020-06-22 RX ADMIN — CEFTRIAXONE 1 G: 1 INJECTION, POWDER, FOR SOLUTION INTRAMUSCULAR; INTRAVENOUS at 10:11

## 2020-06-22 RX ADMIN — Medication 10 ML: at 14:00

## 2020-06-22 RX ADMIN — AMLODIPINE BESYLATE 10 MG: 5 TABLET ORAL at 10:10

## 2020-06-22 RX ADMIN — Medication 10 ML: at 21:06

## 2020-06-22 RX ADMIN — LEVOTHYROXINE SODIUM 175 MCG: 0.03 TABLET ORAL at 05:00

## 2020-06-22 RX ADMIN — POTASSIUM CHLORIDE: 149 INJECTION, SOLUTION, CONCENTRATE INTRAVENOUS at 10:01

## 2020-06-22 RX ADMIN — PRAVASTATIN SODIUM 80 MG: 40 TABLET ORAL at 21:05

## 2020-06-22 RX ADMIN — METOPROLOL SUCCINATE 25 MG: 25 TABLET, EXTENDED RELEASE ORAL at 10:10

## 2020-06-22 RX ADMIN — HEPARIN SODIUM 5000 UNITS: 5000 INJECTION INTRAVENOUS; SUBCUTANEOUS at 17:33

## 2020-06-22 RX ADMIN — DEXTROSE MONOHYDRATE AND SODIUM CHLORIDE 50 ML/HR: 5; .9 INJECTION, SOLUTION INTRAVENOUS at 04:59

## 2020-06-22 RX ADMIN — BRIMONIDINE TARTRATE 1 DROP: 2 SOLUTION OPHTHALMIC at 10:10

## 2020-06-22 RX ADMIN — HEPARIN SODIUM 5000 UNITS: 5000 INJECTION INTRAVENOUS; SUBCUTANEOUS at 23:07

## 2020-06-22 RX ADMIN — HEPARIN SODIUM 5000 UNITS: 5000 INJECTION INTRAVENOUS; SUBCUTANEOUS at 10:12

## 2020-06-22 RX ADMIN — INSULIN LISPRO 4 UNITS: 100 INJECTION, SOLUTION INTRAVENOUS; SUBCUTANEOUS at 13:00

## 2020-06-22 NOTE — PROGRESS NOTES
Bedside and Verbal shift change report given to Cristo Jackson (oncoming nurse) by Anselmo Freeman RN (offgoing nurse). Report included the following information SBAR, Kardex, Intake/Output and MAR and events of the day.

## 2020-06-22 NOTE — PROGRESS NOTES
Problem: Falls - Risk of  Goal: *Absence of Falls  Description: Document Alen Christianson Fall Risk and appropriate interventions in the flowsheet.   Outcome: Progressing Towards Goal  Note: Fall Risk Interventions:  Mobility Interventions: Assess mobility with egress test, Bed/chair exit alarm, OT consult for ADLs, Patient to call before getting OOB, Strengthening exercises (ROM-active/passive), Utilize walker, cane, or other assistive device    Mentation Interventions: Adequate sleep, hydration, pain control, Bed/chair exit alarm, Door open when patient unattended, Increase mobility, More frequent rounding, Room close to nurse's station, Toileting rounds, Update white board    Medication Interventions: Assess postural VS orthostatic hypotension, Bed/chair exit alarm, Patient to call before getting OOB, Teach patient to arise slowly    Elimination Interventions: Bed/chair exit alarm, Call light in reach, Stay With Me (per policy), Toilet paper/wipes in reach, Toileting schedule/hourly rounds

## 2020-06-22 NOTE — ROUTINE PROCESS
Patient was looking for her Dentures, RN tried to look for her dentures in the room and not in there. Patient was accusing RN that RN took her Dentures and try to sell. RN tried to call her Daughter and left a voicemail. Charge Nurse Keegan Houston talk to Patient, and convince her that we are going to call her daughter and locate the dentures in the morning. 1 Daughter Called and talked to RN.  Daughter said the patient does not have dentures when she came in the hospital.

## 2020-06-22 NOTE — PROGRESS NOTES
General Daily Progress Note    Admit Date: 6/21/2020    Subjective:     Patient has no complaint--slightly confused. Current Facility-Administered Medications   Medication Dose Route Frequency    dextrose 5 % - 0.45% NaCl 1,000 mL with potassium chloride 30 mEq infusion   IntraVENous CONTINUOUS    insulin glargine (LANTUS) injection 4 Units  4 Units SubCUTAneous DAILY    insulin lispro (HUMALOG) injection 4 Units  4 Units SubCUTAneous TIDAC    dextrose 10 % infusion 250 mL  250 mL IntraVENous PRN    amLODIPine (NORVASC) tablet 10 mg  10 mg Oral DAILY    brimonidine (ALPHAGAN) 0.2 % ophthalmic solution 1 Drop  1 Drop Both Eyes BID    clopidogreL (PLAVIX) tablet 75 mg  75 mg Oral DAILY    levothyroxine (SYNTHROID) tablet 175 mcg  175 mcg Oral 6am    metoprolol succinate (TOPROL-XL) XL tablet 25 mg  25 mg Oral DAILY    pravastatin (PRAVACHOL) tablet 80 mg  80 mg Oral QHS    sodium chloride (NS) flush 5-40 mL  5-40 mL IntraVENous Q8H    sodium chloride (NS) flush 5-40 mL  5-40 mL IntraVENous PRN    dextrose 5% and 0.9% NaCl infusion  50 mL/hr IntraVENous CONTINUOUS    acetaminophen (TYLENOL) tablet 650 mg  650 mg Oral Q4H PRN    ondansetron (ZOFRAN) injection 4 mg  4 mg IntraVENous Q4H PRN    heparin (porcine) injection 5,000 Units  5,000 Units SubCUTAneous Q8H    cefTRIAXone (ROCEPHIN) 1 g in 0.9% sodium chloride (MBP/ADV) 50 mL  1 g IntraVENous Q24H    glucose chewable tablet 16 g  4 Tab Oral PRN    dextrose (D50W) injection syrg 12.5-25 g  25-50 mL IntraVENous PRN    glucagon (GLUCAGEN) injection 1 mg  1 mg IntraMUSCular PRN        Review of Systems  A comprehensive review of systems was negative. Objective:     Patient Vitals for the past 24 hrs:   BP Temp Pulse Resp SpO2   06/22/20 0737 133/63 98.2 °F (36.8 °C) 69 18 96 %   06/21/20 2216 139/53 98.1 °F (36.7 °C) 72 18 98 %     No intake/output data recorded. No intake/output data recorded.     Physical Exam:   Visit Vitals  /63 (BP 1 Location: Right arm, BP Patient Position: At rest)   Pulse 69   Temp 98.2 °F (36.8 °C)   Resp 18   SpO2 96%     General appearance: alert, cooperative, no distress, appears stated age, oriented times 2  Neck: supple, symmetrical, trachea midline, no adenopathy, thyroid: not enlarged, symmetric, no tenderness/mass/nodules, no carotid bruit and no JVD  Lungs: clear to auscultation bilaterally  Heart: regular rate and rhythm, S1, S2 normal, no murmur, click, rub or gallop  Abdomen: soft, non-tender.  Bowel sounds normal. No masses,  no organomegaly  Extremities: extremities normal, atraumatic, no cyanosis or edema        Data Review   Recent Results (from the past 24 hour(s))   GLUCOSE, POC    Collection Time: 06/21/20  3:58 PM   Result Value Ref Range    Glucose (POC) 324 (H) 65 - 100 mg/dL    Performed by John Ferrara (PCT)    HEMOGLOBIN A1C WITH EAG    Collection Time: 06/21/20  5:05 PM   Result Value Ref Range    Hemoglobin A1c 10.8 (H) 4.0 - 5.6 %    Est. average glucose 263 mg/dL   LIPID PANEL    Collection Time: 06/21/20  5:05 PM   Result Value Ref Range    LIPID PROFILE          Cholesterol, total 167 <200 MG/DL    Triglyceride 77 <150 MG/DL    HDL Cholesterol 96 MG/DL    LDL, calculated 55.6 0 - 100 MG/DL    VLDL, calculated 15.4 MG/DL    CHOL/HDL Ratio 1.7 0.0 - 5.0     TSH 3RD GENERATION    Collection Time: 06/21/20  5:05 PM   Result Value Ref Range    TSH 1.65 0.36 - 3.74 uIU/mL   MAGNESIUM    Collection Time: 06/21/20  5:05 PM   Result Value Ref Range    Magnesium 1.9 1.6 - 2.4 mg/dL   PHOSPHORUS    Collection Time: 06/21/20  5:05 PM   Result Value Ref Range    Phosphorus 4.0 2.6 - 4.7 MG/DL   VITAMIN B12    Collection Time: 06/21/20  5:05 PM   Result Value Ref Range    Vitamin B12 623 193 - 986 pg/mL   CK    Collection Time: 06/21/20  5:05 PM   Result Value Ref Range    CK 53 26 - 192 U/L   TROPONIN I    Collection Time: 06/21/20  5:05 PM   Result Value Ref Range    Troponin-I, Qt. <0.05 <0.05 ng/mL GLUCOSE, POC    Collection Time: 06/21/20  8:47 PM   Result Value Ref Range    Glucose (POC) 314 (H) 65 - 100 mg/dL    Performed by Nicci Moore (PCT)    CK    Collection Time: 06/22/20  2:18 AM   Result Value Ref Range    CK 46 26 - 192 U/L   TROPONIN I    Collection Time: 06/22/20  2:18 AM   Result Value Ref Range    Troponin-I, Qt. <0.05 <0.95 ng/mL   METABOLIC PANEL, COMPREHENSIVE    Collection Time: 06/22/20  2:18 AM   Result Value Ref Range    Sodium 138 136 - 145 mmol/L    Potassium 3.9 3.5 - 5.1 mmol/L    Chloride 105 97 - 108 mmol/L    CO2 25 21 - 32 mmol/L    Anion gap 8 5 - 15 mmol/L    Glucose 114 (H) 65 - 100 mg/dL    BUN 13 6 - 20 MG/DL    Creatinine 0.54 (L) 0.55 - 1.02 MG/DL    BUN/Creatinine ratio 24 (H) 12 - 20      GFR est AA >60 >60 ml/min/1.73m2    GFR est non-AA >60 >60 ml/min/1.73m2    Calcium 8.2 (L) 8.5 - 10.1 MG/DL    Bilirubin, total 0.2 0.2 - 1.0 MG/DL    ALT (SGPT) 39 12 - 78 U/L    AST (SGOT) 36 15 - 37 U/L    Alk. phosphatase 122 (H) 45 - 117 U/L    Protein, total 6.1 (L) 6.4 - 8.2 g/dL    Albumin 2.9 (L) 3.5 - 5.0 g/dL    Globulin 3.2 2.0 - 4.0 g/dL    A-G Ratio 0.9 (L) 1.1 - 2.2     CBC WITH AUTOMATED DIFF    Collection Time: 06/22/20  2:18 AM   Result Value Ref Range    WBC 4.5 3.6 - 11.0 K/uL    RBC 3.64 (L) 3.80 - 5.20 M/uL    HGB 11.5 11.5 - 16.0 g/dL    HCT 34.7 (L) 35.0 - 47.0 %    MCV 95.3 80.0 - 99.0 FL    MCH 31.6 26.0 - 34.0 PG    MCHC 33.1 30.0 - 36.5 g/dL    RDW 13.8 11.5 - 14.5 %    PLATELET 064 900 - 136 K/uL    MPV 9.8 8.9 - 12.9 FL    NRBC 0.0 0  WBC    ABSOLUTE NRBC 0.00 0.00 - 0.01 K/uL    NEUTROPHILS 59 32 - 75 %    LYMPHOCYTES 29 12 - 49 %    MONOCYTES 10 5 - 13 %    EOSINOPHILS 2 0 - 7 %    BASOPHILS 0 0 - 1 %    IMMATURE GRANULOCYTES 0 0.0 - 0.5 %    ABS. NEUTROPHILS 2.6 1.8 - 8.0 K/UL    ABS. LYMPHOCYTES 1.3 0.8 - 3.5 K/UL    ABS. MONOCYTES 0.5 0.0 - 1.0 K/UL    ABS. EOSINOPHILS 0.1 0.0 - 0.4 K/UL    ABS. BASOPHILS 0.0 0.0 - 0.1 K/UL    ABS. IMM. GRANS. 0.0 0.00 - 0.04 K/UL    DF AUTOMATED     CK    Collection Time: 06/22/20  2:18 AM   Result Value Ref Range    CK 43 26 - 192 U/L   GLUCOSE, POC    Collection Time: 06/22/20  7:41 AM   Result Value Ref Range    Glucose (POC) 147 (H) 65 - 100 mg/dL    Performed by Ashley Singleton (CON)    GLUCOSE, POC    Collection Time: 06/22/20 11:10 AM   Result Value Ref Range    Glucose (POC) 438 (H) 65 - 100 mg/dL    Performed by Ashley Singleton (CON)            Assessment:     Active Problems:    Hypoglycemia (6/21/2020)        Plan:     1. Patient now admitted for recurrent hypoglycemia. Starkly she has been coming to the emergency room on multiple which is totally unknown to me. This is exclusively secondary to dietary noncompliance. She was instructed to eat at the same time every day which has not been occurring. This was reiterated to the family also. Her dementia precludes her from actually remembering the times to eat. 2.  I will continue to monitor blood sugars gradual resumption of insulin.

## 2020-06-22 NOTE — ROUTINE PROCESS
Bedside and Verbal shift change report given to Ashley Darby (oncoming nurse) by Abdiel Brand (offgoing nurse). Report included the following information SBAR, Kardex, ED Summary, [facility] and Recent Results.

## 2020-06-23 ENCOUNTER — HOME CARE VISIT (OUTPATIENT)
Dept: HOME HEALTH SERVICES | Facility: HOME HEALTH | Age: 80
End: 2020-06-23
Payer: MEDICARE

## 2020-06-23 LAB
ANION GAP SERPL CALC-SCNC: 8 MMOL/L (ref 5–15)
BASOPHILS # BLD: 0 K/UL (ref 0–0.1)
BASOPHILS NFR BLD: 0 % (ref 0–1)
BLASTS NFR BLD MANUAL: 0 %
BUN SERPL-MCNC: 13 MG/DL (ref 6–20)
BUN/CREAT SERPL: 25 (ref 12–20)
CALCIUM SERPL-MCNC: 8.2 MG/DL (ref 8.5–10.1)
CHLORIDE SERPL-SCNC: 104 MMOL/L (ref 97–108)
CK SERPL-CCNC: 34 U/L (ref 26–192)
CO2 SERPL-SCNC: 26 MMOL/L (ref 21–32)
CREAT SERPL-MCNC: 0.51 MG/DL (ref 0.55–1.02)
DIFFERENTIAL METHOD BLD: ABNORMAL
EOSINOPHIL # BLD: 0.1 K/UL (ref 0–0.4)
EOSINOPHIL NFR BLD: 2 % (ref 0–7)
ERYTHROCYTE [DISTWIDTH] IN BLOOD BY AUTOMATED COUNT: 13.8 % (ref 11.5–14.5)
GLUCOSE BLD STRIP.AUTO-MCNC: 117 MG/DL (ref 65–100)
GLUCOSE BLD STRIP.AUTO-MCNC: 173 MG/DL (ref 65–100)
GLUCOSE BLD STRIP.AUTO-MCNC: 218 MG/DL (ref 65–100)
GLUCOSE BLD STRIP.AUTO-MCNC: 253 MG/DL (ref 65–100)
GLUCOSE SERPL-MCNC: 170 MG/DL (ref 65–100)
HCT VFR BLD AUTO: 33 % (ref 35–47)
HGB BLD-MCNC: 11.1 G/DL (ref 11.5–16)
IMM GRANULOCYTES # BLD AUTO: 0 K/UL
IMM GRANULOCYTES NFR BLD AUTO: 0 %
LYMPHOCYTES # BLD: 1.1 K/UL (ref 0.8–3.5)
LYMPHOCYTES NFR BLD: 36 % (ref 12–49)
MCH RBC QN AUTO: 32 PG (ref 26–34)
MCHC RBC AUTO-ENTMCNC: 33.6 G/DL (ref 30–36.5)
MCV RBC AUTO: 95.1 FL (ref 80–99)
METAMYELOCYTES NFR BLD MANUAL: 0 %
MONOCYTES # BLD: 0.2 K/UL (ref 0–1)
MONOCYTES NFR BLD: 6 % (ref 5–13)
MYELOCYTES NFR BLD MANUAL: 0 %
NEUTS BAND NFR BLD MANUAL: 0 % (ref 0–6)
NEUTS SEG # BLD: 1.6 K/UL (ref 1.8–8)
NEUTS SEG NFR BLD: 56 % (ref 32–75)
NRBC # BLD: 0 K/UL (ref 0–0.01)
NRBC BLD-RTO: 0 PER 100 WBC
OTHER CELLS NFR BLD MANUAL: 0 %
PLATELET # BLD AUTO: 238 K/UL (ref 150–400)
PMV BLD AUTO: 10.2 FL (ref 8.9–12.9)
POTASSIUM SERPL-SCNC: 4.2 MMOL/L (ref 3.5–5.1)
PROMYELOCYTES NFR BLD MANUAL: 0 %
RBC # BLD AUTO: 3.47 M/UL (ref 3.8–5.2)
RBC MORPH BLD: ABNORMAL
SERVICE CMNT-IMP: ABNORMAL
SODIUM SERPL-SCNC: 138 MMOL/L (ref 136–145)
WBC # BLD AUTO: 3 K/UL (ref 3.6–11)
WBC MORPH BLD: ABNORMAL

## 2020-06-23 PROCEDURE — 96376 TX/PRO/DX INJ SAME DRUG ADON: CPT

## 2020-06-23 PROCEDURE — 85027 COMPLETE CBC AUTOMATED: CPT

## 2020-06-23 PROCEDURE — 3331090002 HH PPS REVENUE DEBIT

## 2020-06-23 PROCEDURE — 94760 N-INVAS EAR/PLS OXIMETRY 1: CPT

## 2020-06-23 PROCEDURE — 99218 HC RM OBSERVATION: CPT

## 2020-06-23 PROCEDURE — 74011636637 HC RX REV CODE- 636/637: Performed by: INTERNAL MEDICINE

## 2020-06-23 PROCEDURE — 74011250637 HC RX REV CODE- 250/637: Performed by: FAMILY MEDICINE

## 2020-06-23 PROCEDURE — 82962 GLUCOSE BLOOD TEST: CPT

## 2020-06-23 PROCEDURE — 80048 BASIC METABOLIC PNL TOTAL CA: CPT

## 2020-06-23 PROCEDURE — 36415 COLL VENOUS BLD VENIPUNCTURE: CPT

## 2020-06-23 PROCEDURE — 3331090001 HH PPS REVENUE CREDIT

## 2020-06-23 PROCEDURE — 74011250637 HC RX REV CODE- 250/637: Performed by: INTERNAL MEDICINE

## 2020-06-23 PROCEDURE — 82550 ASSAY OF CK (CPK): CPT

## 2020-06-23 PROCEDURE — 96372 THER/PROPH/DIAG INJ SC/IM: CPT

## 2020-06-23 PROCEDURE — 74011000258 HC RX REV CODE- 258: Performed by: FAMILY MEDICINE

## 2020-06-23 PROCEDURE — 74011250636 HC RX REV CODE- 250/636: Performed by: FAMILY MEDICINE

## 2020-06-23 RX ORDER — INSULIN GLARGINE 100 [IU]/ML
10 INJECTION, SOLUTION SUBCUTANEOUS DAILY
Status: DISCONTINUED | OUTPATIENT
Start: 2020-06-23 | End: 2020-06-25 | Stop reason: HOSPADM

## 2020-06-23 RX ADMIN — LEVOTHYROXINE SODIUM 175 MCG: 0.03 TABLET ORAL at 05:05

## 2020-06-23 RX ADMIN — INSULIN LISPRO 4 UNITS: 100 INJECTION, SOLUTION INTRAVENOUS; SUBCUTANEOUS at 11:28

## 2020-06-23 RX ADMIN — Medication 1 CAPSULE: at 08:44

## 2020-06-23 RX ADMIN — INSULIN LISPRO 4 UNITS: 100 INJECTION, SOLUTION INTRAVENOUS; SUBCUTANEOUS at 17:44

## 2020-06-23 RX ADMIN — INSULIN GLARGINE 10 UNITS: 100 INJECTION, SOLUTION SUBCUTANEOUS at 08:40

## 2020-06-23 RX ADMIN — Medication 10 ML: at 22:30

## 2020-06-23 RX ADMIN — METOPROLOL SUCCINATE 25 MG: 25 TABLET, EXTENDED RELEASE ORAL at 08:43

## 2020-06-23 RX ADMIN — HEPARIN SODIUM 5000 UNITS: 5000 INJECTION INTRAVENOUS; SUBCUTANEOUS at 17:46

## 2020-06-23 RX ADMIN — HEPARIN SODIUM 5000 UNITS: 5000 INJECTION INTRAVENOUS; SUBCUTANEOUS at 08:44

## 2020-06-23 RX ADMIN — CEFTRIAXONE 1 G: 1 INJECTION, POWDER, FOR SOLUTION INTRAMUSCULAR; INTRAVENOUS at 11:30

## 2020-06-23 RX ADMIN — HEPARIN SODIUM 5000 UNITS: 5000 INJECTION INTRAVENOUS; SUBCUTANEOUS at 23:38

## 2020-06-23 RX ADMIN — CLOPIDOGREL BISULFATE 75 MG: 75 TABLET ORAL at 08:44

## 2020-06-23 RX ADMIN — BRIMONIDINE TARTRATE 1 DROP: 2 SOLUTION OPHTHALMIC at 22:30

## 2020-06-23 RX ADMIN — Medication 10 ML: at 05:05

## 2020-06-23 RX ADMIN — BRIMONIDINE TARTRATE 1 DROP: 2 SOLUTION OPHTHALMIC at 08:44

## 2020-06-23 RX ADMIN — Medication 10 ML: at 14:00

## 2020-06-23 RX ADMIN — INSULIN LISPRO 4 UNITS: 100 INJECTION, SOLUTION INTRAVENOUS; SUBCUTANEOUS at 08:41

## 2020-06-23 RX ADMIN — PRAVASTATIN SODIUM 80 MG: 40 TABLET ORAL at 22:30

## 2020-06-23 RX ADMIN — AMLODIPINE BESYLATE 10 MG: 5 TABLET ORAL at 08:44

## 2020-06-23 NOTE — ROUTINE PROCESS
Bedside and Verbal shift change report given to Lona Johnson (oncoming nurse) by Zaire Solano (offgoing nurse). Report included the following information SBAR, Kardex, ED Summary, [facility] and Recent Results.

## 2020-06-23 NOTE — PHYSICIAN ADVISORY
Letter of Status Determination: Current Status OBSERVATION is Appropriate Pt Name:  Hector Jesus MR#  [id number] [facility]#   [id number] Room and 1780 Paulette Lundberg  @ [facility] Hospitalization date  6/21/2020  6:25 AM  
Current Attending Physician  Altagracia Hebert MD  
Principal diagnosis  Hypoglycemia Clinicals  Patient is a poor historian, history was obtained from the patient as well as patient's son Mejia Arevalo. Inocencia Lmion reports that patient was found in her bed this morning at around 4 AM foaming at the mouth and much less responsive than usual.  Inocencia Limon reports that patient has history of recurrent hypoglycemia and the family got concerned that she was hypoglycemic again and called EMS. Patient's blood glucose was reported in the 30s by EMS, patient was given D10 and and it came up to 130s. Patient was brought to the ER, patient blood glucose started to trend lower in the ER and patient was requested to be admitted to the hospitalist service. Patient has had multiple visits to [address] [facility] ER for recurrent hypoglycemia. Patient was found to have a UTI on the last visit and was started on Keflex. Pt w/ hypoglycemia, BG improved,  
 
  
[place] MCG criteria Does  NOT apply STATUS DETERMINATION  On the basis of clinical data, available documentaion, we believe that the current status of this patient as OBSERVATION is Appropriate The final decision of the patient's hospitalization status depends on the attending physician's judgment Additional comments Insurance  Payor: Erich Danielle / Plan: [address] [place] [address] HMO / Product Type: Managed Care Medicare / Insurance Information [address] [place] [address] Phone: [phone] Subscriber: Markel Mace Subscriber#: [id number] Group#: [id number] Precert#:   
  
 
  
 
 
 
 
Alix Bundy MD 
Cell: [phone] Physician Advisor

## 2020-06-23 NOTE — PROGRESS NOTES
General Daily Progress Note    Admit Date: 6/21/2020    Subjective:     Patient has no complaint and is more alert. .     Current Facility-Administered Medications   Medication Dose Route Frequency    lactobac ac& pc-s.therm-b.anim (GALILEA Q/RISAQUAD)  1 Cap Oral DAILY    insulin glargine (LANTUS) injection 10 Units  10 Units SubCUTAneous DAILY    insulin lispro (HUMALOG) injection 4 Units  4 Units SubCUTAneous TIDAC    dextrose 10 % infusion 250 mL  250 mL IntraVENous PRN    amLODIPine (NORVASC) tablet 10 mg  10 mg Oral DAILY    brimonidine (ALPHAGAN) 0.2 % ophthalmic solution 1 Drop  1 Drop Both Eyes BID    clopidogreL (PLAVIX) tablet 75 mg  75 mg Oral DAILY    levothyroxine (SYNTHROID) tablet 175 mcg  175 mcg Oral 6am    metoprolol succinate (TOPROL-XL) XL tablet 25 mg  25 mg Oral DAILY    pravastatin (PRAVACHOL) tablet 80 mg  80 mg Oral QHS    sodium chloride (NS) flush 5-40 mL  5-40 mL IntraVENous Q8H    sodium chloride (NS) flush 5-40 mL  5-40 mL IntraVENous PRN    acetaminophen (TYLENOL) tablet 650 mg  650 mg Oral Q4H PRN    ondansetron (ZOFRAN) injection 4 mg  4 mg IntraVENous Q4H PRN    heparin (porcine) injection 5,000 Units  5,000 Units SubCUTAneous Q8H    cefTRIAXone (ROCEPHIN) 1 g in 0.9% sodium chloride (MBP/ADV) 50 mL  1 g IntraVENous Q24H    glucose chewable tablet 16 g  4 Tab Oral PRN    dextrose (D50W) injection syrg 12.5-25 g  25-50 mL IntraVENous PRN    glucagon (GLUCAGEN) injection 1 mg  1 mg IntraMUSCular PRN        Review of Systems  A comprehensive review of systems was negative. Objective:     Patient Vitals for the past 24 hrs:   BP Temp Pulse Resp SpO2   06/22/20 2255 120/53 98.1 °F (36.7 °C) 62 18 98 %   06/22/20 1446 143/55 98 °F (36.7 °C) 70 18 98 %     No intake/output data recorded.   06/21 1901 - 06/23 0700  In: 1298.3 [P.O.:640; I.V.:658.3]  Out: 450     Physical Exam:   Visit Vitals  /53 (BP 1 Location: Right arm, BP Patient Position: Sitting)   Pulse 62 Temp 98.1 °F (36.7 °C)   Resp 18   SpO2 98%     General appearance: alert, cooperative, no distress, appears stated age  Neck: supple, symmetrical, trachea midline, no adenopathy, thyroid: not enlarged, symmetric, no tenderness/mass/nodules, no carotid bruit and no JVD  Lungs: clear to auscultation bilaterally  Heart: regular rate and rhythm, S1, S2 normal, no murmur, click, rub or gallop  Abdomen: soft, non-tender. Bowel sounds normal. No masses,  no organomegaly  Extremities: extremities normal, atraumatic, no cyanosis or edema    Assessment:     Active Problems:    Hypoglycemia (6/21/2020)        Plan:     1. Continue diabetic control. Hypoglycemia continues to improve. P.O. intake appears to be adequate. 2.  Hydration adequate. 3.  Discharge tomorrow if all goes well.

## 2020-06-23 NOTE — PROGRESS NOTES
ALEKSANDAR:   1) Home with HH (RN, PT, OT, SW)- [address]   2) Home with follow up appts     1:14 PM- CM spoke with Alfred Chappellsch at [facility]- he is reaching out to pt's daughter to get paperwork completed once paperwork is completed they can accept her and assist with getting Medicaid. Pt's daughter aware. Floor CM updated and will continue to follow pt for discharge planning. 12:06 PM- CM contacted the Alysia Grullon at [facility] ([phone])- she is not in the office due to COVID-19. CM contacted her cell phone per her voicemail- [phone] and left a HIPPA compliant VM, waiting on a return phone call. CM sent an e-mail to Eber with [employer] regarding the status of pt's Medicaid application, waiting on response. [address] aware of admission and are willing to accept pt back, AVS updated. 9:53 AM- Observation stay noted- pt is a 67year old,  [place] female after being found approx 4 am foaming at the mouth and more unresponsive than usual (per son). Pt has had multiple hospitalizations due to non-management of diabetes and severe dementia. Floor JUNIOR Llamas spoke with attending regarding a Palliative consult- attending does not believe that is appropriate at this time. Pt unable to complete AMD as she has severe dementia, LNOKs are daughter Colten and son Migue Vick, son Charlene Lin is unable to be a decision maker as he has ID. Last admission pt was sent home with [facility] services and an APS follow up [facility] made one and so did EMS as pt calls them almost daily). Pt was also connected with [facility] who stated pt does qualify (6/1/2020)  provided pt's dtr with paperwork to complete. CM attempted to contact pt's son Migue Vick ([phone]) no answer, unable to leave a VM. CM contacted pt's dtr Colten ([phone]) regarding long term plans. Dtr understands pt is not safe in the home and states they cannot afford 24/7 caregivers.  Pt's dtr states she was approved for Medicaid and they mailed back paperwork, waiting to hear from [facility] regarding the status and if they need any additional items. Dtr states she is willing to have any additional resources states APS is involved and she is working with them to see what else can be done- dtr states that APS was connecting them with a day service since pt has dementia, but has not heard anything else regarding that. CM attempted to contact [facility] to see what services they were connecting pt and her family with- ([phone], no answer, continued to ring, unable to leave a message. CM spoke with pt's dtr again- she states she does not have a caseworkers phone number for APS. CM attempted to contact [facility] to see if they have any openings, no answer, HIPPA complaint VM left with their  [place] ([phone]). CM provided pt's dtr with the phone number and website to follow up as well. SPENSER orders placed to [address] [place] [address] and [facility] with [facility] notified of pt's admission and planned discharge for tomorrow. Observation notice provided in writing to patient and/or caregiver as well as verbal explanation of the policy. Patients who are in outpatient status also receive the Observation notice (verbal consent from pt's dtr, pt has dementia and is not able to sign). Care Management Interventions  PCP Verified by CM:  Yes  Mode of Transport at Discharge: Self  Transition of Care Consult (CM Consult): Discharge Planning  MyChart Signup: No  Discharge Durable Medical Equipment: No  Health Maintenance Reviewed: Yes  Physical Therapy Consult: No  Occupational Therapy Consult: No  Speech Therapy Consult: No  Current Support Network: Relative's Home  Confirm Follow Up Transport: Family  The Patient and/or Patient Representative was Provided with a Choice of Provider and Agrees with the Discharge Plan?: Yes  Name of the Patient Representative Who was Provided with a Choice of Provider and Agrees with the Discharge Plan: Spoke with pt's dtr Harrison Addison of Choice List was Provided with Basic Dialogue that Supports the Patient's Individualized Plan of Care/Goals, Treatment Preferences and Shares the Quality Data Associated with the Providers?: Yes  Discharge Location  Discharge Placement: Home with home health     [place], [address] [facility]    [phone]

## 2020-06-23 NOTE — PROGRESS NOTES
Problem: Falls - Risk of  Goal: *Absence of Falls  Description: Document Enrike Liang Fall Risk and appropriate interventions in the flowsheet.   Outcome: Progressing Towards Goal  Note: Fall Risk Interventions:  Mobility Interventions: Assess mobility with egress test, Bed/chair exit alarm, OT consult for ADLs, Patient to call before getting OOB, Strengthening exercises (ROM-active/passive), Utilize walker, cane, or other assistive device    Mentation Interventions: Adequate sleep, hydration, pain control, Bed/chair exit alarm, Increase mobility, More frequent rounding, Self-releasing belt, Toileting rounds    Medication Interventions: Assess postural VS orthostatic hypotension, Bed/chair exit alarm, Patient to call before getting OOB, Teach patient to arise slowly    Elimination Interventions: Bed/chair exit alarm, Call light in reach, Stay With Me (per policy), Toilet paper/wipes in reach, Toileting schedule/hourly rounds

## 2020-06-23 NOTE — PROGRESS NOTES
Bedside and Verbal shift change report given to Ethan Mccain RN (oncoming nurse) by Cleopatra Olmstead RN (offgoing nurse). Report included the following information SBAR, Kardex, Intake/Output and MAR and events of the day.

## 2020-06-24 LAB
GLUCOSE BLD STRIP.AUTO-MCNC: 146 MG/DL (ref 65–100)
GLUCOSE BLD STRIP.AUTO-MCNC: 355 MG/DL (ref 65–100)
GLUCOSE BLD STRIP.AUTO-MCNC: 366 MG/DL (ref 65–100)
GLUCOSE BLD STRIP.AUTO-MCNC: 81 MG/DL (ref 65–100)
GLUCOSE BLD STRIP.AUTO-MCNC: 82 MG/DL (ref 65–100)
SERVICE CMNT-IMP: ABNORMAL
SERVICE CMNT-IMP: NORMAL
SERVICE CMNT-IMP: NORMAL

## 2020-06-24 PROCEDURE — 94760 N-INVAS EAR/PLS OXIMETRY 1: CPT

## 2020-06-24 PROCEDURE — 74011250637 HC RX REV CODE- 250/637: Performed by: FAMILY MEDICINE

## 2020-06-24 PROCEDURE — 74011636637 HC RX REV CODE- 636/637: Performed by: INTERNAL MEDICINE

## 2020-06-24 PROCEDURE — 74011250637 HC RX REV CODE- 250/637: Performed by: INTERNAL MEDICINE

## 2020-06-24 PROCEDURE — 94761 N-INVAS EAR/PLS OXIMETRY MLT: CPT

## 2020-06-24 PROCEDURE — 96372 THER/PROPH/DIAG INJ SC/IM: CPT

## 2020-06-24 PROCEDURE — 99218 HC RM OBSERVATION: CPT

## 2020-06-24 PROCEDURE — 74011000258 HC RX REV CODE- 258: Performed by: INTERNAL MEDICINE

## 2020-06-24 PROCEDURE — 3331090001 HH PPS REVENUE CREDIT

## 2020-06-24 PROCEDURE — 96376 TX/PRO/DX INJ SAME DRUG ADON: CPT

## 2020-06-24 PROCEDURE — 74011250636 HC RX REV CODE- 250/636: Performed by: INTERNAL MEDICINE

## 2020-06-24 PROCEDURE — 82962 GLUCOSE BLOOD TEST: CPT

## 2020-06-24 PROCEDURE — 3331090002 HH PPS REVENUE DEBIT

## 2020-06-24 RX ORDER — INSULIN LISPRO 100 [IU]/ML
4 INJECTION, SOLUTION INTRAVENOUS; SUBCUTANEOUS
Status: DISCONTINUED | OUTPATIENT
Start: 2020-06-24 | End: 2020-06-25 | Stop reason: HOSPADM

## 2020-06-24 RX ORDER — INSULIN LISPRO 100 [IU]/ML
2 INJECTION, SOLUTION INTRAVENOUS; SUBCUTANEOUS
Status: DISCONTINUED | OUTPATIENT
Start: 2020-06-24 | End: 2020-06-25 | Stop reason: HOSPADM

## 2020-06-24 RX ADMIN — BRIMONIDINE TARTRATE 1 DROP: 2 SOLUTION OPHTHALMIC at 21:01

## 2020-06-24 RX ADMIN — LEVOTHYROXINE SODIUM 175 MCG: 0.03 TABLET ORAL at 05:59

## 2020-06-24 RX ADMIN — INSULIN GLARGINE 10 UNITS: 100 INJECTION, SOLUTION SUBCUTANEOUS at 09:39

## 2020-06-24 RX ADMIN — INSULIN LISPRO 2 UNITS: 100 INJECTION, SOLUTION INTRAVENOUS; SUBCUTANEOUS at 17:56

## 2020-06-24 RX ADMIN — PRAVASTATIN SODIUM 80 MG: 40 TABLET ORAL at 21:01

## 2020-06-24 RX ADMIN — INSULIN LISPRO 4 UNITS: 100 INJECTION, SOLUTION INTRAVENOUS; SUBCUTANEOUS at 11:22

## 2020-06-24 RX ADMIN — CLOPIDOGREL BISULFATE 75 MG: 75 TABLET ORAL at 09:40

## 2020-06-24 RX ADMIN — BRIMONIDINE TARTRATE 1 DROP: 2 SOLUTION OPHTHALMIC at 09:43

## 2020-06-24 RX ADMIN — Medication 1 CAPSULE: at 09:40

## 2020-06-24 RX ADMIN — Medication 10 ML: at 14:00

## 2020-06-24 RX ADMIN — CEFTRIAXONE 1 G: 1 INJECTION, POWDER, FOR SOLUTION INTRAMUSCULAR; INTRAVENOUS at 10:39

## 2020-06-24 RX ADMIN — AMLODIPINE BESYLATE 10 MG: 5 TABLET ORAL at 09:40

## 2020-06-24 RX ADMIN — Medication 10 ML: at 21:42

## 2020-06-24 RX ADMIN — Medication 10 ML: at 05:59

## 2020-06-24 RX ADMIN — METOPROLOL SUCCINATE 25 MG: 25 TABLET, EXTENDED RELEASE ORAL at 09:41

## 2020-06-24 RX ADMIN — HEPARIN SODIUM 5000 UNITS: 5000 INJECTION INTRAVENOUS; SUBCUTANEOUS at 09:40

## 2020-06-24 RX ADMIN — HEPARIN SODIUM 5000 UNITS: 5000 INJECTION INTRAVENOUS; SUBCUTANEOUS at 17:56

## 2020-06-24 NOTE — PROGRESS NOTES
General Daily Progress Note    Admit Date: 6/21/2020    Subjective:     Patient has no complaint . Shady Torres Current Facility-Administered Medications   Medication Dose Route Frequency    insulin lispro (HUMALOG) injection 4 Units  4 Units SubCUTAneous ACL    insulin lispro (HUMALOG) injection 2 Units  2 Units SubCUTAneous ACB&D    lactobac ac& pc-s.therm-b.anim (GALILEA Q/RISAQUAD)  1 Cap Oral DAILY    insulin glargine (LANTUS) injection 10 Units  10 Units SubCUTAneous DAILY    dextrose 10 % infusion 250 mL  250 mL IntraVENous PRN    amLODIPine (NORVASC) tablet 10 mg  10 mg Oral DAILY    brimonidine (ALPHAGAN) 0.2 % ophthalmic solution 1 Drop  1 Drop Both Eyes BID    clopidogreL (PLAVIX) tablet 75 mg  75 mg Oral DAILY    levothyroxine (SYNTHROID) tablet 175 mcg  175 mcg Oral 6am    metoprolol succinate (TOPROL-XL) XL tablet 25 mg  25 mg Oral DAILY    pravastatin (PRAVACHOL) tablet 80 mg  80 mg Oral QHS    sodium chloride (NS) flush 5-40 mL  5-40 mL IntraVENous Q8H    sodium chloride (NS) flush 5-40 mL  5-40 mL IntraVENous PRN    acetaminophen (TYLENOL) tablet 650 mg  650 mg Oral Q4H PRN    ondansetron (ZOFRAN) injection 4 mg  4 mg IntraVENous Q4H PRN    heparin (porcine) injection 5,000 Units  5,000 Units SubCUTAneous Q8H    cefTRIAXone (ROCEPHIN) 1 g in 0.9% sodium chloride (MBP/ADV) 50 mL  1 g IntraVENous Q24H    glucose chewable tablet 16 g  4 Tab Oral PRN    dextrose (D50W) injection syrg 12.5-25 g  25-50 mL IntraVENous PRN    glucagon (GLUCAGEN) injection 1 mg  1 mg IntraMUSCular PRN        Review of Systems  A comprehensive review of systems was negative. Objective:     Patient Vitals for the past 24 hrs:   BP Temp Pulse Resp SpO2   06/24/20 0759 163/70 98.3 °F (36.8 °C) 66 17 100 %   06/23/20 2243 132/60 97.6 °F (36.4 °C) 66 17 96 %   06/23/20 1527 121/54 98.2 °F (36.8 °C) 70 16 100 %   06/23/20 0847 169/54 97.9 °F (36.6 °C) 65 18 97 %     No intake/output data recorded.   06/22 1901 - 06/24 0700  In: 1778.3 [P.O.:1120; I.V.:658.3]  Out: 452 [Urine:1]    Physical Exam:   Visit Vitals  /70 (BP 1 Location: Left arm, BP Patient Position: At rest)   Pulse 66   Temp 98.3 °F (36.8 °C)   Resp 17   SpO2 100%     General appearance: alert, cooperative, no distress, appears stated age, oriented X2  Neck: supple, symmetrical, trachea midline, no adenopathy, thyroid: not enlarged, symmetric, no tenderness/mass/nodules, no carotid bruit and no JVD  Lungs: clear to auscultation bilaterally  Heart: regular rate and rhythm, S1, S2 normal, no murmur, click, rub or gallop  Abdomen: soft, non-tender. Bowel sounds normal. No masses,  no organomegaly  Extremities: extremities normal, atraumatic, no cyanosis or edema    Assessment:     Active Problems:    Hypoglycemia (6/21/2020)        Plan:     1. I contemplated d/c today but BS values remain too variable. Will plan for d/c tomorrow to get better feel for insulin dosing to prevent symptomatic hypoglycemia. Several things have changed including variable caloric intake, weight reduction, increasing confusion secondary to dementia presence of hypoglycemic unawareness. 2.  Continue treatment for urinary tract infection. 3.  Continue blood pressure control.

## 2020-06-24 NOTE — PROGRESS NOTES
Bedside and Verbal shift change report given to Taylor (oncoming nurse) by Hayley Bang (offgoing nurse). Report included the following information SBAR, Kardex, Intake/Output, MAR and Recent Results.

## 2020-06-24 NOTE — PROGRESS NOTES
Spiritual Care Assessment/Progress Note  [facility]      NAME: Becky King      MRN: [id number]  AGE: 67 y.o.  SEX: female  Jew Affiliation: Congregation   Language: English     6/24/2020     Total Time (in minutes): 15     Spiritual Assessment begun in [facility] 3 MED TELE through conversation with:         [x]Patient        [] Family    [] Friend(s)        Reason for Consult: Initial/Spiritual assessment, patient floor     Spiritual beliefs: (Please include comment if needed)     [x] Identifies with a erlinda tradition: Congregation        [] Supported by a erlinda community:            [] Claims no spiritual orientation:           [] Seeking spiritual identity:                [] Adheres to an individual form of spirituality:           [] Not able to assess:                           Identified resources for coping:      [] Prayer                               [] Music                  [] Guided Imagery     [x] Family/friends                 [] Pet visits     [] Devotional reading                         [] Unknown     [] Other:                                              Interventions offered during this visit: (See comments for more details)    Patient Interventions: Affirmation of emotions/emotional suffering, Affirmation of erlinda, Catharsis/review of pertinent events in supportive environment, Coping skills reviewed/reinforced, Iconic (affirming the presence of God/Higher Power), Initial/Spiritual assessment, Critical care, Prayer (actual)           Plan of Care:     [] Support spiritual and/or cultural needs    [] Support AMD and/or advance care planning process      [] Support grieving process   [] Coordinate Rites and/or Rituals    [] Coordination with community clergy   [] No spiritual needs identified at this time   [] Detailed Plan of Care below (See Comments)  [] Make referral to Music Therapy  [] Make referral to Pet Therapy     [] Make referral to Addiction services  [] Make referral to Sacred Passages  [] Make referral to Spiritual Care Partner  [] No future visits requested        [x] Follow up visits as needed     Comments:  visit for initial spiritual assessment. Patient sitting in chair at bedside. Good eye contact, smiling, friendly. Says she is feeling much better. Provided spiritual presence and listening as she spoke of her present thoughts, feelings, and concerns. Spoke briefly of her health and the reasons for her hospitalization. Says she is feeling much better and hopes to be able to be discharged home soon. Spoke of her three sons saying she lives with one of her sons. Described good family support. Also spoke of her erlinda. When asked how we can best serve her while she is here, she said she was comfortable and not in need of anything. Informed her of availability of  and pastoral care services. She appeared comforted as a result of this visit and expressed gratitude for this visit. Visited by Rev. Claudine Lane MDiv, [facility], [facility]   paging service: [phone])

## 2020-06-24 NOTE — PROGRESS NOTES
Bedside shift change report given to Carmita (oncoming nurse) by Soham Langley (offgoing nurse). Report included the following information SBAR, Kardex, Intake/Output, MAR and Recent Results.

## 2020-06-24 NOTE — ROUTINE PROCESS
The following appointments have been successfully scheduled: 
 
Date/time Monday, June 29, 2020 01:15 PM 
Patient  Yassine Jarvis 1940 ([id number]) #[id number] [facility] OFFICE-PCP Appointment type Transitional Care Provider Glo Johnson

## 2020-06-24 NOTE — PROGRESS NOTES
Bedside and Verbal shift change report given to Byron Moore (oncoming nurse) by Jose Cummings (offgoing nurse). Report included the following information SBAR, Kardex, Accordion and Recent Results.

## 2020-06-24 NOTE — PROGRESS NOTES
ALEKSANDAR plan:    -  Home with HH resumption - [facility] for PT, OT, SN, SW  -  OP f/u appts - PCP  -  Referral to [facility] - sent 6/23  -  UAI to be completed and faxed to [address]  -  Son to transport home  -  OBS letter    CM spoke with pt's daughter, Erica Hernandez ([phone]), to plan for d/c tomorrow. Erica Hernandez stated her older brother will be available around 5 or 10 am to transport pt home if she's medically ready.      Maday Jackson MSLA  Care Manager  [phone]

## 2020-06-25 VITALS
SYSTOLIC BLOOD PRESSURE: 166 MMHG | HEART RATE: 67 BPM | DIASTOLIC BLOOD PRESSURE: 85 MMHG | TEMPERATURE: 97.8 F | OXYGEN SATURATION: 99 % | RESPIRATION RATE: 18 BRPM

## 2020-06-25 LAB
GLUCOSE BLD STRIP.AUTO-MCNC: 138 MG/DL (ref 65–100)
SERVICE CMNT-IMP: ABNORMAL

## 2020-06-25 PROCEDURE — 74011250636 HC RX REV CODE- 250/636: Performed by: INTERNAL MEDICINE

## 2020-06-25 PROCEDURE — 96372 THER/PROPH/DIAG INJ SC/IM: CPT

## 2020-06-25 PROCEDURE — 74011250637 HC RX REV CODE- 250/637: Performed by: INTERNAL MEDICINE

## 2020-06-25 PROCEDURE — 74011636637 HC RX REV CODE- 636/637: Performed by: INTERNAL MEDICINE

## 2020-06-25 PROCEDURE — 99218 HC RM OBSERVATION: CPT

## 2020-06-25 PROCEDURE — 74011250637 HC RX REV CODE- 250/637: Performed by: FAMILY MEDICINE

## 2020-06-25 PROCEDURE — 82962 GLUCOSE BLOOD TEST: CPT

## 2020-06-25 PROCEDURE — 3331090001 HH PPS REVENUE CREDIT

## 2020-06-25 PROCEDURE — 74011000250 HC RX REV CODE- 250: Performed by: FAMILY MEDICINE

## 2020-06-25 PROCEDURE — 3331090002 HH PPS REVENUE DEBIT

## 2020-06-25 PROCEDURE — 94760 N-INVAS EAR/PLS OXIMETRY 1: CPT

## 2020-06-25 RX ORDER — INSULIN LISPRO 100 [IU]/ML
INJECTION, SOLUTION INTRAVENOUS; SUBCUTANEOUS
Qty: 2 ADJUSTABLE DOSE PRE-FILLED PEN SYRINGE | Refills: 11 | Status: SHIPPED | OUTPATIENT
Start: 2020-06-25 | End: 2020-06-27 | Stop reason: SDUPTHER

## 2020-06-25 RX ORDER — INSULIN DEGLUDEC INJECTION 100 U/ML
12 INJECTION, SOLUTION SUBCUTANEOUS DAILY
Qty: 2 ADJUSTABLE DOSE PRE-FILLED PEN SYRINGE | Refills: 11 | Status: SHIPPED | OUTPATIENT
Start: 2020-06-25 | End: 2020-06-27 | Stop reason: SDUPTHER

## 2020-06-25 RX ADMIN — INSULIN GLARGINE 10 UNITS: 100 INJECTION, SOLUTION SUBCUTANEOUS at 08:51

## 2020-06-25 RX ADMIN — HEPARIN SODIUM 5000 UNITS: 5000 INJECTION INTRAVENOUS; SUBCUTANEOUS at 08:52

## 2020-06-25 RX ADMIN — Medication 10 ML: at 05:38

## 2020-06-25 RX ADMIN — BRIMONIDINE TARTRATE 1 DROP: 2 SOLUTION OPHTHALMIC at 08:52

## 2020-06-25 RX ADMIN — METOPROLOL SUCCINATE 25 MG: 25 TABLET, EXTENDED RELEASE ORAL at 08:51

## 2020-06-25 RX ADMIN — CLOPIDOGREL BISULFATE 75 MG: 75 TABLET ORAL at 08:51

## 2020-06-25 RX ADMIN — Medication 1 CAPSULE: at 08:52

## 2020-06-25 RX ADMIN — HEPARIN SODIUM 5000 UNITS: 5000 INJECTION INTRAVENOUS; SUBCUTANEOUS at 00:28

## 2020-06-25 RX ADMIN — LEVOTHYROXINE SODIUM 175 MCG: 0.03 TABLET ORAL at 05:38

## 2020-06-25 RX ADMIN — AMLODIPINE BESYLATE 10 MG: 5 TABLET ORAL at 08:52

## 2020-06-25 RX ADMIN — INSULIN LISPRO 2 UNITS: 100 INJECTION, SOLUTION INTRAVENOUS; SUBCUTANEOUS at 08:50

## 2020-06-25 NOTE — PROGRESS NOTES
ALEKSANDAR plan:     -  Home with [facility] resumption - [facility] for SN, SW, Aide  -  OP f/u appts - PCP scheduled for 6/29/20  -  Referral to [facility] - sent 6/23, they will contact pt's family to arrange  -  CM will complete UAI and fax to [address] [place] [address],[address] and [facility]  -  Son to transport home at 10:00 am  -  Plan discussed with daughter, Kai Cowden. No further concerns indicated at this time. AVS updated. Patient is ready for discharge from a Care Management standpoint. Care Management Interventions  PCP Verified by CM: Yes  Mode of Transport at Discharge:  Other (see comment)(son)  Hospital Transport Time of Discharge: 1030  Transition of Care Consult (CM Consult): [address]: Yes  MyChart Signup: No  Discharge Durable Medical Equipment: No  Health Maintenance Reviewed: Yes  Physical Therapy Consult: No  Occupational Therapy Consult: No  Speech Therapy Consult: No  Current Support Network: Relative's Home  Confirm Follow Up Transport: Family  The Plan for Transition of Care is Related to the Following Treatment Goals : home health  The Patient and/or Patient Representative was Provided with a Choice of Provider and Agrees with the Discharge Plan?: Yes  Name of the Patient Representative Who was Provided with a Choice of Provider and Agrees with the Discharge Plan: Spoke with pt's dtr [address] [place] [address] Choice List was Provided with Basic Dialogue that Supports the Patient's Individualized Plan of Care/Goals, Treatment Preferences and Shares the Quality Data Associated with the Providers?: Yes  Discharge Location  Discharge Placement: Home with home health    ROSA Fraire  Care Manager  [phone]

## 2020-06-25 NOTE — PROGRESS NOTES
Patient discharge home with her son. Copy of discharge instructions reviewed with the son. Prescriptions sent to the patient's pharmacy. IV removed prior to discharge. All belongings check in the cabinet and sent with patient. Patient send to discharge lobby via wheelchair.

## 2020-06-25 NOTE — PROGRESS NOTES
Bedside shift change report given to Carmita (oncoming nurse) by Tarah Wooten (offgoing nurse). Report included the following information SBAR, Kardex, Intake/Output, MAR and Recent Results.

## 2020-06-26 ENCOUNTER — PATIENT OUTREACH (OUTPATIENT)
Dept: CASE MANAGEMENT | Age: 80
End: 2020-06-26

## 2020-06-26 LAB
BACTERIA SPEC CULT: NORMAL
SERVICE CMNT-IMP: NORMAL

## 2020-06-26 NOTE — PROGRESS NOTES
6/26/20 - Care transitions nurse    Melvin Rendon, VIN, [facility], [facility]  Care transitions nurse [phone]  [facility] Coordination Team    Call to and reached pt's daughter - Melia Mclean - who was out of town. Call to [address] [place] [address] - request  re: discharge and dynamics in the home. Call to brother - Elton Wallace - who was at work -   Ms. Lauren Cortés is home with another son who has a disability - he is unable to oversee/ administer meds - per Elton Wallace. Discussion with Elton Wallace - re: the dangers of hypoglycemia - and encouraged family to discuss having someone stay with Deana - while Medicaid and other resources are investigated. Will refer to LCSW - for assistance - for urgency in Medicaid and medication safety administration - to oversee that pt is eating. Message to Dr. Mallory White - re: home situation. Patient was admitted to [facility] on 6/21 and discharged on 6/25/20  for hypoglycemia - 3rd admission for same. . Patient was contacted within 1 business days of discharge. Top Discharge Challenges to be reviewed by the provider   Additional needs identified to be addressed with provider yes  home health care, medications, PT, SN and personal care aide        Discussed COVID-19 related testing which was not done at this time. Test results were not done. Patient informed of results, if available?  N/a     Method of communication with provider : staff message       Advance Care Planning:   Does patient have an Advance Directive:  not on file; education provided     Inpatient Readmission Risk score: HIgh/ dementia/ Diabetes, debility  Was this a readmission? no observation  Patient stated reason for the admission: pt has dementia - hypoglycemia  Patients top risk factors for readmission: depression, financial, functional cognitive ability, functional physical ability, ineffective coping, lack of knowledge about disease, medical condition, medication management, stages of grief, support system and transportation  Interventions to address risk factors: Call to pt's daughter re: safety concerns/ discussed. Call to pt's son, Evangelina Saunders - re: safety concerns/ need for someone to stay with pt while services are researched. Care Transition Nurse (CTN) contacted the family by telephone to perform post hospital discharge assessment. Verified name and  with family as identifiers. Provided introduction to self, and explanation of the CTN role. CTN reviewed discharge instructions, medical action plan and red flags with family who verbalized understanding. Family given an opportunity to ask questions and does not have any further questions or concerns at this time. The family agrees to contact the PCP office for questions related to their healthcare. Medication reconciliation was performed with family, who verbalizes understanding of administration of home medications. Advised obtaining a 90-day supply of all daily and as-needed medications. Referral to Pharm D needed: no     Home Health/Outpatient orders at discharge: home health care  [address]: Resumption [facility]  Date of initial visit: pending - call to manager to discuss    [facility] ordered at discharge: none  [address] received: n/a    Covid Risk Education    Patient has following risk factors of: diabetes. Education provided regarding infection prevention, and signs and symptoms of COVID-19 and when to seek medical attention with family who verbalized understanding. Discussed exposure protocols and quarantine From CDC: Are you at higher risk for severe illness?  and given an opportunity for questions and concerns. The family agrees to contact the COVID-19 hotline [phone] or PCP office for questions related to COVID-19.      For more information on steps you can take to protect yourself, see CDC's How to Protect Yourself     Patient/family/caregiver given information for [employer] and agrees to enroll no  Patient's preferred e-mail:   Patient's preferred phone number: [phone], [phone], [phone]    Discussed follow-up appointments. If no appointment was previously scheduled, appointment scheduling offered: yes  [facility] follow up appointment(s):   Future Appointments   Date Time Provider Tawnya Cristina   6/29/2020  1:15 PM Nina Sahu MD [facility]   7/7/2020  4:00 PM Nina Sahu MD On license of [facility]   7/8/2020  1:15 PM Darlene Lino MD [facility]     [id number] [place] follow up appointment(s): none    Plan for follow-up call in 3-5 days based on severity of symptoms and risk factors. CTN provided contact information for future needs. _______________________________________________________________________________________  Dr. Sherrilee Holstein note - pre discharge  Assessment:      Active Problems:    Hypoglycemia (6/21/2020)       Plan:      1.  I contemplated d/c today but BS values remain too variable. Will plan for d/c tomorrow to get better feel for insulin dosing to prevent symptomatic hypoglycemia. Several things have changed including variable caloric intake, weight reduction, increasing confusion secondary to dementia presence of hypoglycemic unawareness. 2.  Continue treatment for urinary tract infection. 3.  Continue blood pressure control. CM note in patient  Last admission pt was sent home with [facility] services and an APS follow up [facility] made one and so did EMS as pt calls them almost daily). Pt was also connected with [facility] who stated pt does qualify (6/1/2020)  provided pt's dtr with paperwork to complete.      ALEKSANDAR plan:     -  Home with  resumption - [facility] for SN, SW, Aide  -  OP f/u appts - PCP scheduled for 6/29/20  -  Referral to [facility] - sent 6/23, they will contact pt's family to arrange  -  CM will complete UAI and fax to [employer] [place] [address] Drive to transport home at 10:00 am  -  Plan discussed with daughter, Bert Luna Lele.

## 2020-06-27 RX ORDER — INSULIN DEGLUDEC INJECTION 100 U/ML
12 INJECTION, SOLUTION SUBCUTANEOUS DAILY
Qty: 2 ADJUSTABLE DOSE PRE-FILLED PEN SYRINGE | Refills: 11 | Status: ON HOLD | OUTPATIENT
Start: 2020-06-27 | End: 2020-08-29 | Stop reason: SDUPTHER

## 2020-06-27 RX ORDER — INSULIN LISPRO 100 [IU]/ML
INJECTION, SOLUTION INTRAVENOUS; SUBCUTANEOUS
Qty: 2 ADJUSTABLE DOSE PRE-FILLED PEN SYRINGE | Refills: 11 | Status: ON HOLD | OUTPATIENT
Start: 2020-06-27 | End: 2020-08-29 | Stop reason: SDUPTHER

## 2020-06-29 ENCOUNTER — OFFICE VISIT (OUTPATIENT)
Dept: INTERNAL MEDICINE CLINIC | Age: 80
End: 2020-06-29

## 2020-06-29 ENCOUNTER — PATIENT OUTREACH (OUTPATIENT)
Dept: INTERNAL MEDICINE CLINIC | Age: 80
End: 2020-06-29

## 2020-06-29 VITALS
RESPIRATION RATE: 14 BRPM | BODY MASS INDEX: 24.73 KG/M2 | HEART RATE: 77 BPM | HEIGHT: 63 IN | TEMPERATURE: 98 F | OXYGEN SATURATION: 97 % | WEIGHT: 139.6 LBS | DIASTOLIC BLOOD PRESSURE: 69 MMHG | SYSTOLIC BLOOD PRESSURE: 160 MMHG

## 2020-06-29 DIAGNOSIS — E16.2 HYPOGLYCEMIA: ICD-10-CM

## 2020-06-29 DIAGNOSIS — E10.65 HYPERGLYCEMIA DUE TO TYPE 1 DIABETES MELLITUS (HCC): Primary | ICD-10-CM

## 2020-06-29 DIAGNOSIS — E78.5 DYSLIPIDEMIA: ICD-10-CM

## 2020-06-29 DIAGNOSIS — I10 ESSENTIAL HYPERTENSION: ICD-10-CM

## 2020-06-29 DIAGNOSIS — R63.4 WEIGHT REDUCTION: ICD-10-CM

## 2020-06-29 DIAGNOSIS — F01.50 VASCULAR DEMENTIA WITHOUT BEHAVIORAL DISTURBANCE (HCC): ICD-10-CM

## 2020-06-29 DIAGNOSIS — E10.649 HYPOGLYCEMIA UNAWARENESS ASSOCIATED WITH TYPE 1 DIABETES MELLITUS (HCC): ICD-10-CM

## 2020-06-29 NOTE — PROGRESS NOTES
SW received referral from CTN requesting a follow up call with patient's daughter to offer assistance/support with establishing personal care and assistance for patient. SW left a voicemail requesting a call back. SW will follow up once call is returned.

## 2020-06-29 NOTE — PROGRESS NOTES
Chief Complaint   Patient presents with    Transitions Of Care     Patient admitted to [facility] on 6/21/2020 for hypoglycemia. 1. Have you been to the ER, urgent care clinic since your last visit? Hospitalized since your last visit? Yes When: 6/21/2020 Where: [facility]  Reason for visit: hypoglycemia    2. Have you seen or consulted any other health care providers outside of the [address] [place] [address] since your last visit? Include any pap smears or colon screening.  No

## 2020-06-30 ENCOUNTER — HOME CARE VISIT (OUTPATIENT)
Dept: HOME HEALTH SERVICES | Facility: HOME HEALTH | Age: 80
End: 2020-06-30

## 2020-06-30 LAB
BUN SERPL-MCNC: 20 MG/DL (ref 8–27)
BUN/CREAT SERPL: 26 (ref 12–28)
CALCIUM SERPL-MCNC: 8.8 MG/DL (ref 8.7–10.3)
CHLORIDE SERPL-SCNC: 100 MMOL/L (ref 96–106)
CO2 SERPL-SCNC: 15 MMOL/L (ref 20–29)
CREAT SERPL-MCNC: 0.76 MG/DL (ref 0.57–1)
GLUCOSE SERPL-MCNC: 429 MG/DL (ref 65–99)
POTASSIUM SERPL-SCNC: 5.8 MMOL/L (ref 3.5–5.2)
SODIUM SERPL-SCNC: 132 MMOL/L (ref 134–144)

## 2020-06-30 NOTE — PROGRESS NOTES
[facility]  [place] [address]  [address] [facility] [address]  Phone:  [phone]  Fax: [phone]       Chief Complaint   Patient presents with    Transitions Of Care     Patient admitted to [facility] on 6/21/2020 for hypoglycemia. .      SUBJECTIVE:    Sunil Cortez is a 67 y.o. female Patient comes in, not accompanied by any relative. Interestingly, I do not see her daughter anymore. Her dementia is getting progressively worse. As a direct result of this, unfortunately she has a disabled son in the home, who is not capable of assisting her with her needs currently. The principle thing that need be done is getting the patient up in a timely fashion and eating at the same time every day. If this is done, hypoglycemia will tim and her diabetic control will improve. Unfortunately, the home setting precludes this. She answers my questions, but most of the time I cannot believe her because she cannot remember. Again, as I stated, no one is here to assist.    She has a past history of primary hypertension, dyslipidemia and hypothyroidism. Current Outpatient Medications   Medication Sig Dispense Refill    insulin degludec [place] FlexTouch U-100) 100 unit/mL (3 mL) inpn 12 Units by SubCUTAneous route daily.  2 Adjustable Dose Pre-filled Pen Syringe 11    insulin lispro (HUMALOG) 100 unit/mL kwikpen 4 units before breakfast and lunch, 2 units before dinner 2 Adjustable Dose Pre-filled Pen Syringe 11    metoprolol succinate (TOPROL-XL) 25 mg XL tablet TAKE 1 TABLET BY MOUTH EVERY DAY 90 Tab 3    pravastatin (PRAVACHOL) 80 mg tablet TAKE 1 TABLET BY MOUTH at bedtime 90 Tab 3    levothyroxine (SYNTHROID) 175 mcg tablet TAKE 1 TABLET BY MOUTH EVERY DAY 90 Tab 3    clopidogreL (PLAVIX) 75 mg tab TAKE 1 TABLET BY MOUTH EVERY DAY 90 Tab 3    amLODIPine (NORVASC) 10 mg tablet TAKE 1 TABLET BY MOUTH EVERY DAY IN THE MORNING 90 Tab 3    flash glucose scanning reader ("[employer]STYLE KEVON 14 DAY READER) [facility] As directed1 1 Each 5    flash glucose sensor (FREESTYLE KEVON 14 DAY SENSOR) kit Continuous monitoring 2 Kit 11    glucose blood VI test strips (ONETOUCH ULTRA BLUE TEST STRIP) strip USE TO CHECK BLOOD SUGARS DAILY. Dx: E11.649 100 Strip 11    Blood-Glucose Meter (ONETOUCH ULTRA2 METER) misc USE TO CHECK BLOOD SUGARS DAILY. 1 Each 0    brimonidine (ALPHAGAN) 0.15 % ophthalmic solution Administer 1 Drop to both eyes two (2) times a day.        Past Medical History:   Diagnosis Date    Heart failure ([facility] [address]     unknown to family    Hypertension     Stroke [facility])      Past Surgical History:   Procedure Laterality Date    HX GYN      HX HEENT      thyroidectomy    HX HYSTERECTOMY      REMOVAL GALLBLADDER      THYROIDECTOMY       No Known Allergies      REVIEW OF SYSTEMS:  General: negative for - chills or fever  ENT: negative for - headaches, nasal congestion or tinnitus  Respiratory: negative for - cough, hemoptysis, shortness of breath or wheezing  Cardiovascular : negative for - chest pain, edema, palpitations or shortness of breath  Gastrointestinal: negative for - abdominal pain, blood in stools, heartburn or nausea/vomiting  Genito-Urinary: no dysuria, trouble voiding, or hematuria  Musculoskeletal: negative for - gait disturbance, joint pain, joint stiffness or joint swelling  Neurological: no TIA or stroke symptoms  Hematologic: no bruises, no bleeding, no swollen glands  Integument: no lumps, mole changes, nail changes or rash  Endocrine: no malaise/lethargy or unexpected weight changes      Social History     Socioeconomic History    Marital status:      Spouse name: Not on file    Number of children: 1    Years of education: Not on file    Highest education level: Not on file   Occupational History    Occupation: retired   Tobacco Use    Smoking status: Never Smoker    Smokeless tobacco: Never Used   Substance and Sexual Activity    Alcohol use: No     Comment: been years    Drug use: No    Sexual activity: Not Currently     Family History   Problem Relation Age of Onset    Diabetes Father     No Known Problems Mother        OBJECTIVE:    Visit Vitals  /69   Pulse 77   Temp 98 °F (36.7 °C) (Oral)   Resp 14   Ht 5' 3\" (1.6 m)   Wt 139 lb 9.6 oz (63.3 kg)   SpO2 97%   BMI 24.73 kg/m²     CONSTITUTIONAL: well , well nourished, appears age appropriate  EYES: perrla, eom intact  ENMT:moist mucous membranes, pharynx clear  NECK: supple. Thyroid normal  RESPIRATORY: Chest: clear to ascultation and percussion   CARDIOVASCULAR: Heart: regular rate and rhythm  GASTROINTESTINAL: Abdomen: soft, bowel sounds active  HEMATOLOGIC: no pathological lymph nodes palpated  MUSCULOSKELETAL: Extremities: no edema, pulse 1+   INTEGUMENT: No unusual rashes or suspicious skin lesions noted. Nails appear normal.  NEUROLOGIC: non-focal exam   MENTAL STATUS: alert and oriented, appropriate affect      ASSESSMENT:  1. Hyperglycemia due to type 1 diabetes mellitus ([address]    2. Hypoglycemia    3. 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 Vascular dementia without behavioral disturbance ([address]    5. Dyslipidemia    6. Essential hypertension    7. Weight reduction        PLAN:    1. The patient's diabetes remains poorly controlled. This will not improve until there is adequate supervision in her home setting. She actually has hypoglycemic unawareness also. The likelihood of repetitive hospitalizations is extremely high. 2. Interestingly, her vascular dementia is getting progressively worse fairly rapidly. It is quite possible this may have been exacerbated by her chronic recurring hypoglycemia. 3. She will continue her statin as prescribed. 4. Blood pressure is excellent, no adjustments are made. 5. She is also losing weight. I suspect this is related to a decrease in her PO intake.   I have found no obvious pathology leading to this.  I suspect this is related to her progressive dementia. .  Orders Placed This Encounter    METABOLIC PANEL, BASIC         Follow-up and Dispositions    · Return in about 2 weeks (around 7/13/2020).            Marco Lynch MD

## 2020-07-02 ENCOUNTER — HOME CARE VISIT (OUTPATIENT)
Dept: HOME HEALTH SERVICES | Facility: HOME HEALTH | Age: 80
End: 2020-07-02

## 2020-07-06 ENCOUNTER — PATIENT OUTREACH (OUTPATIENT)
Dept: INTERNAL MEDICINE CLINIC | Age: 80
End: 2020-07-06

## 2020-07-06 ENCOUNTER — HOSPITAL ENCOUNTER (EMERGENCY)
Age: 80
Discharge: HOME | End: 2020-07-06
Attending: EMERGENCY MEDICINE | Admitting: EMERGENCY MEDICINE
Payer: MEDICARE

## 2020-07-06 VITALS
OXYGEN SATURATION: 98 % | DIASTOLIC BLOOD PRESSURE: 61 MMHG | HEIGHT: 61 IN | TEMPERATURE: 97.5 F | SYSTOLIC BLOOD PRESSURE: 150 MMHG | WEIGHT: 150.35 LBS | HEART RATE: 73 BPM | RESPIRATION RATE: 18 BRPM | BODY MASS INDEX: 28.39 KG/M2

## 2020-07-06 DIAGNOSIS — E16.2 HYPOGLYCEMIA: Primary | ICD-10-CM

## 2020-07-06 LAB
ALBUMIN SERPL-MCNC: 3.7 G/DL (ref 3.5–5)
ALBUMIN/GLOB SERPL: 0.9 {RATIO} (ref 1.1–2.2)
ALP SERPL-CCNC: 138 U/L (ref 45–117)
ALT SERPL-CCNC: 54 U/L (ref 12–78)
ANION GAP SERPL CALC-SCNC: 5 MMOL/L (ref 5–15)
APPEARANCE UR: CLEAR
AST SERPL-CCNC: 39 U/L (ref 15–37)
ATRIAL RATE: 85 BPM
BACTERIA URNS QL MICRO: NEGATIVE /HPF
BASOPHILS # BLD: 0 K/UL (ref 0–0.1)
BASOPHILS NFR BLD: 1 % (ref 0–1)
BILIRUB SERPL-MCNC: 0.7 MG/DL (ref 0.2–1)
BILIRUB UR QL: NEGATIVE
BUN SERPL-MCNC: 14 MG/DL (ref 6–20)
BUN/CREAT SERPL: 20 (ref 12–20)
CALCIUM SERPL-MCNC: 8.9 MG/DL (ref 8.5–10.1)
CALCULATED P AXIS, ECG09: 41 DEGREES
CALCULATED R AXIS, ECG10: -41 DEGREES
CALCULATED T AXIS, ECG11: 19 DEGREES
CHLORIDE SERPL-SCNC: 103 MMOL/L (ref 97–108)
CO2 SERPL-SCNC: 25 MMOL/L (ref 21–32)
COLOR UR: ABNORMAL
CREAT SERPL-MCNC: 0.71 MG/DL (ref 0.55–1.02)
DIAGNOSIS, 93000: NORMAL
DIFFERENTIAL METHOD BLD: ABNORMAL
EOSINOPHIL # BLD: 0.1 K/UL (ref 0–0.4)
EOSINOPHIL NFR BLD: 2 % (ref 0–7)
EPITH CASTS URNS QL MICRO: ABNORMAL /LPF
ERYTHROCYTE [DISTWIDTH] IN BLOOD BY AUTOMATED COUNT: 14 % (ref 11.5–14.5)
GLOBULIN SER CALC-MCNC: 4 G/DL (ref 2–4)
GLUCOSE BLD STRIP.AUTO-MCNC: 126 MG/DL (ref 65–100)
GLUCOSE BLD STRIP.AUTO-MCNC: 212 MG/DL (ref 65–100)
GLUCOSE BLD STRIP.AUTO-MCNC: 216 MG/DL (ref 65–100)
GLUCOSE BLD STRIP.AUTO-MCNC: 219 MG/DL (ref 65–100)
GLUCOSE BLD STRIP.AUTO-MCNC: 367 MG/DL (ref 65–100)
GLUCOSE BLD STRIP.AUTO-MCNC: 49 MG/DL (ref 65–100)
GLUCOSE BLD STRIP.AUTO-MCNC: 50 MG/DL (ref 65–100)
GLUCOSE SERPL-MCNC: 39 MG/DL (ref 65–100)
GLUCOSE UR STRIP.AUTO-MCNC: 100 MG/DL
HCT VFR BLD AUTO: 40.7 % (ref 35–47)
HGB BLD-MCNC: 13.4 G/DL (ref 11.5–16)
HGB UR QL STRIP: NEGATIVE
IMM GRANULOCYTES # BLD AUTO: 0 K/UL (ref 0–0.04)
IMM GRANULOCYTES NFR BLD AUTO: 1 % (ref 0–0.5)
KETONES UR QL STRIP.AUTO: NEGATIVE MG/DL
LEUKOCYTE ESTERASE UR QL STRIP.AUTO: ABNORMAL
LYMPHOCYTES # BLD: 1.9 K/UL (ref 0.8–3.5)
LYMPHOCYTES NFR BLD: 44 % (ref 12–49)
MCH RBC QN AUTO: 31.2 PG (ref 26–34)
MCHC RBC AUTO-ENTMCNC: 32.9 G/DL (ref 30–36.5)
MCV RBC AUTO: 94.9 FL (ref 80–99)
MONOCYTES # BLD: 0.4 K/UL (ref 0–1)
MONOCYTES NFR BLD: 9 % (ref 5–13)
NEUTS SEG # BLD: 1.9 K/UL (ref 1.8–8)
NEUTS SEG NFR BLD: 43 % (ref 32–75)
NITRITE UR QL STRIP.AUTO: NEGATIVE
NRBC # BLD: 0 K/UL (ref 0–0.01)
NRBC BLD-RTO: 0 PER 100 WBC
P-R INTERVAL, ECG05: 158 MS
PH UR STRIP: 7.5 [PH] (ref 5–8)
PLATELET # BLD AUTO: 268 K/UL (ref 150–400)
PMV BLD AUTO: 10.3 FL (ref 8.9–12.9)
POTASSIUM SERPL-SCNC: 4.8 MMOL/L (ref 3.5–5.1)
PROT SERPL-MCNC: 7.7 G/DL (ref 6.4–8.2)
PROT UR STRIP-MCNC: NEGATIVE MG/DL
Q-T INTERVAL, ECG07: 352 MS
QRS DURATION, ECG06: 90 MS
QTC CALCULATION (BEZET), ECG08: 418 MS
RBC # BLD AUTO: 4.29 M/UL (ref 3.8–5.2)
RBC #/AREA URNS HPF: ABNORMAL /HPF (ref 0–5)
SERVICE CMNT-IMP: ABNORMAL
SODIUM SERPL-SCNC: 133 MMOL/L (ref 136–145)
SP GR UR REFRACTOMETRY: 1.01 (ref 1–1.03)
TROPONIN I SERPL-MCNC: <0.05 NG/ML
UROBILINOGEN UR QL STRIP.AUTO: 1 EU/DL (ref 0.2–1)
VENTRICULAR RATE, ECG03: 85 BPM
WBC # BLD AUTO: 4.4 K/UL (ref 3.6–11)
WBC URNS QL MICRO: ABNORMAL /HPF (ref 0–4)

## 2020-07-06 PROCEDURE — 82962 GLUCOSE BLOOD TEST: CPT

## 2020-07-06 PROCEDURE — 93005 ELECTROCARDIOGRAM TRACING: CPT

## 2020-07-06 PROCEDURE — 99285 EMERGENCY DEPT VISIT HI MDM: CPT

## 2020-07-06 PROCEDURE — 36415 COLL VENOUS BLD VENIPUNCTURE: CPT

## 2020-07-06 PROCEDURE — 74011000250 HC RX REV CODE- 250

## 2020-07-06 PROCEDURE — 96374 THER/PROPH/DIAG INJ IV PUSH: CPT

## 2020-07-06 PROCEDURE — 80053 COMPREHEN METABOLIC PANEL: CPT

## 2020-07-06 PROCEDURE — 74011636637 HC RX REV CODE- 636/637: Performed by: EMERGENCY MEDICINE

## 2020-07-06 PROCEDURE — 84484 ASSAY OF TROPONIN QUANT: CPT

## 2020-07-06 PROCEDURE — 81001 URINALYSIS AUTO W/SCOPE: CPT

## 2020-07-06 PROCEDURE — 85025 COMPLETE CBC W/AUTO DIFF WBC: CPT

## 2020-07-06 RX ORDER — DEXTROSE 50 % IN WATER (D50W) INTRAVENOUS SYRINGE
50
Status: COMPLETED | OUTPATIENT
Start: 2020-07-06 | End: 2020-07-06

## 2020-07-06 RX ORDER — INSULIN LISPRO 100 [IU]/ML
4 INJECTION, SOLUTION INTRAVENOUS; SUBCUTANEOUS ONCE
Status: COMPLETED | OUTPATIENT
Start: 2020-07-06 | End: 2020-07-06

## 2020-07-06 RX ORDER — DEXTROSE 50 % IN WATER (D50W) INTRAVENOUS SYRINGE
Status: COMPLETED
Start: 2020-07-06 | End: 2020-07-06

## 2020-07-06 RX ADMIN — DEXTROSE MONOHYDRATE 25 G: 500 INJECTION PARENTERAL at 08:32

## 2020-07-06 RX ADMIN — INSULIN LISPRO 4 UNITS: 100 INJECTION, SOLUTION INTRAVENOUS; SUBCUTANEOUS at 15:40

## 2020-07-06 RX ADMIN — DEXTROSE 50 % IN WATER (D50W) INTRAVENOUS SYRINGE 25 G: at 08:32

## 2020-07-06 NOTE — ED PROVIDER NOTES
EMERGENCY DEPARTMENT HISTORY AND PHYSICAL EXAM      Date: 7/6/2020  Patient Name: Sridhar Tesfaye    History of Presenting Illness     Chief Complaint   Patient presents with    Low Blood Sugar       History Provided By: Patient    HPI: Sridhar Tesfaye, 67 y.o. female with PMHx as noted below presents the emergency department with hypoglycemia. EMS was called this morning for reported altered mental status. Noted patient's blood glucose to be in the 50s on arrival.  No interventions prior to arrival in the emergency department. Further history is unavailable secondary to the patient's altered mental status. PCP: Lupillo Serrano MD    Current Outpatient Medications   Medication Sig Dispense Refill    insulin degludec Blackmno Victor M FlexTouch U-100) 100 unit/mL (3 mL) inpn 12 Units by SubCUTAneous route daily. 2 Adjustable Dose Pre-filled Pen Syringe 11    insulin lispro (HUMALOG) 100 unit/mL kwikpen 4 units before breakfast and lunch, 2 units before dinner 2 Adjustable Dose Pre-filled Pen Syringe 11    metoprolol succinate (TOPROL-XL) 25 mg XL tablet TAKE 1 TABLET BY MOUTH EVERY DAY 90 Tab 3    pravastatin (PRAVACHOL) 80 mg tablet TAKE 1 TABLET BY MOUTH at bedtime 90 Tab 3    levothyroxine (SYNTHROID) 175 mcg tablet TAKE 1 TABLET BY MOUTH EVERY DAY 90 Tab 3    clopidogreL (PLAVIX) 75 mg tab TAKE 1 TABLET BY MOUTH EVERY DAY 90 Tab 3    amLODIPine (NORVASC) 10 mg tablet TAKE 1 TABLET BY MOUTH EVERY DAY IN THE MORNING 90 Tab 3    brimonidine (ALPHAGAN) 0.15 % ophthalmic solution Administer 1 Drop to both eyes two (2) times a day.  flash glucose scanning reader (FREESTYLE KEVON 14 DAY READER) [facility] As directed1 1 Each 5    flash glucose sensor (FREESTYLE KEVON 14 DAY SENSOR) kit Continuous monitoring 2 Kit 11    glucose blood VI test strips (ONETOUCH ULTRA BLUE TEST STRIP) strip USE TO CHECK BLOOD SUGARS DAILY.  Dx: E11.649 100 Strip 11    Blood-Glucose Meter (ONETOUCH ULTRA2 METER) misc USE TO CHECK BLOOD SUGARS DAILY. 1 Each 0       Past History     Past Medical History:  Past Medical History:   Diagnosis Date    Heart failure ([address]     unknown to family    Hypertension     Stroke [facility])        Past Surgical History:  Past Surgical History:   Procedure Laterality Date    HX GYN      HX HEENT      thyroidectomy    HX HYSTERECTOMY      REMOVAL GALLBLADDER      THYROIDECTOMY         Family History:  Family History   Problem Relation Age of Onset    Diabetes Father     No Known Problems Mother        Social History:  Social History     Tobacco Use    Smoking status: Never Smoker    Smokeless tobacco: Never Used   Substance Use Topics    Alcohol use: No     Comment: been years    Drug use: No       Allergies:  No Known Allergies      Review of Systems   Review of Systems   Unable to perform ROS: Mental status change       Physical Exam   Physical Exam    GENERAL: Sleepy but arousable, confused  EYES: PEERL, No injection, discharge or icterus. ENT: Mucous membranes pink and moist.  NECK: Supple  LUNGS: Airway patent. Non-labored respirations. Breath sounds clear with good air entry bilaterally. HEART: Regular rate and rhythm. No peripheral edema  ABDOMEN: Non-distended and non-tender, without guarding or rebound.   SKIN:  warm, dry  MSK/EXTREMITIES: Without swelling, tenderness or deformity, symmetric with normal ROM  NEUROLOGICAL: Sleepy but arousable, confused      Diagnostic Study Results     Labs -     Recent Results (from the past 12 hour(s))   GLUCOSE, POC    Collection Time: 07/06/20  8:26 AM   Result Value Ref Range    Glucose (POC) 50 (L) 65 - 100 mg/dL    Performed by Arlyne Call MSN    GLUCOSE, POC    Collection Time: 07/06/20  8:27 AM   Result Value Ref Range    Glucose (POC) 49 (LL) 65 - 100 mg/dL    Performed by Arlyne Call MSN    CBC WITH AUTOMATED DIFF    Collection Time: 07/06/20  8:35 AM   Result Value Ref Range    WBC 4.4 3.6 - 11.0 K/uL    RBC 4.29 3.80 - 5.20 M/uL    HGB 13.4 11.5 - 16.0 g/dL    HCT 40.7 35.0 - 47.0 %    MCV 94.9 80.0 - 99.0 FL    MCH 31.2 26.0 - 34.0 PG    MCHC 32.9 30.0 - 36.5 g/dL    RDW 14.0 11.5 - 14.5 %    PLATELET 154 800 - 765 K/uL    MPV 10.3 8.9 - 12.9 FL    NRBC 0.0 0  WBC    ABSOLUTE NRBC 0.00 0.00 - 0.01 K/uL    NEUTROPHILS 43 32 - 75 %    LYMPHOCYTES 44 12 - 49 %    MONOCYTES 9 5 - 13 %    EOSINOPHILS 2 0 - 7 %    BASOPHILS 1 0 - 1 %    IMMATURE GRANULOCYTES 1 (H) 0.0 - 0.5 %    ABS. NEUTROPHILS 1.9 1.8 - 8.0 K/UL    ABS. LYMPHOCYTES 1.9 0.8 - 3.5 K/UL    ABS. MONOCYTES 0.4 0.0 - 1.0 K/UL    ABS. EOSINOPHILS 0.1 0.0 - 0.4 K/UL    ABS. BASOPHILS 0.0 0.0 - 0.1 K/UL    ABS. IMM. GRANS. 0.0 0.00 - 0.04 K/UL    DF AUTOMATED     METABOLIC PANEL, COMPREHENSIVE    Collection Time: 07/06/20  8:35 AM   Result Value Ref Range    Sodium 133 (L) 136 - 145 mmol/L    Potassium 4.8 3.5 - 5.1 mmol/L    Chloride 103 97 - 108 mmol/L    CO2 25 21 - 32 mmol/L    Anion gap 5 5 - 15 mmol/L    Glucose 39 (LL) 65 - 100 mg/dL    BUN 14 6 - 20 MG/DL    Creatinine 0.71 0.55 - 1.02 MG/DL    BUN/Creatinine ratio 20 12 - 20      GFR est AA >60 >60 ml/min/1.73m2    GFR est non-AA >60 >60 ml/min/1.73m2    Calcium 8.9 8.5 - 10.1 MG/DL    Bilirubin, total 0.7 0.2 - 1.0 MG/DL    ALT (SGPT) 54 12 - 78 U/L    AST (SGOT) 39 (H) 15 - 37 U/L    Alk.  phosphatase 138 (H) 45 - 117 U/L    Protein, total 7.7 6.4 - 8.2 g/dL    Albumin 3.7 3.5 - 5.0 g/dL    Globulin 4.0 2.0 - 4.0 g/dL    A-G Ratio 0.9 (L) 1.1 - 2.2     TROPONIN I    Collection Time: 07/06/20  8:35 AM   Result Value Ref Range    Troponin-I, Qt. <0.05 <0.05 ng/mL   EKG, 12 LEAD, INITIAL    Collection Time: 07/06/20  8:38 AM   Result Value Ref Range    Ventricular Rate 85 BPM    Atrial Rate 85 BPM    P-R Interval 158 ms    QRS Duration 90 ms    Q-T Interval 352 ms    QTC Calculation (Bezet) 418 ms    Calculated P Axis 41 degrees    Calculated R Axis -41 degrees    Calculated T Axis 19 degrees    Diagnosis       Sinus rhythm with occasional premature ventricular complexes  Left axis deviation  Possible Anterior infarct , age undetermined  When compared with ECG of 21-JUN-2020 08:54,  premature ventricular complexes are now present  Nonspecific T wave abnormality, improved in Anterior leads  QT has shortened  Confirmed by Cinda Cherry ([id number]) on 7/6/2020 2:29:19 PM     URINALYSIS W/ RFLX MICROSCOPIC    Collection Time: 07/06/20  8:44 AM   Result Value Ref Range    Color YELLOW/STRAW      Appearance CLEAR CLEAR      Specific gravity 1.008 1.003 - 1.030      pH (UA) 7.5 5.0 - 8.0      Protein Negative NEG mg/dL    Glucose 100 (A) NEG mg/dL    Ketone Negative NEG mg/dL    Bilirubin Negative NEG      Blood Negative NEG      Urobilinogen 1.0 0.2 - 1.0 EU/dL    Nitrites Negative NEG      Leukocyte Esterase SMALL (A) NEG      WBC 0-4 0 - 4 /hpf    RBC 0-5 0 - 5 /hpf    Epithelial cells FEW FEW /lpf    Bacteria Negative NEG /hpf   GLUCOSE, POC    Collection Time: 07/06/20  8:49 AM   Result Value Ref Range    Glucose (POC) 216 (H) 65 - 100 mg/dL    Performed by King Moore [facility]    GLUCOSE, POC    Collection Time: 07/06/20  9:35 AM   Result Value Ref Range    Glucose (POC) 126 (H) 65 - 100 mg/dL    Performed by King Moore MSN    GLUCOSE, POC    Collection Time: 07/06/20 10:32 AM   Result Value Ref Range    Glucose (POC) 219 (H) 65 - 100 mg/dL    Performed by Rowan Naejra    GLUCOSE, POC    Collection Time: 07/06/20 11:56 AM   Result Value Ref Range    Glucose (POC) 212 (H) 65 - 100 mg/dL    Performed by King Moore [facility]    GLUCOSE, POC    Collection Time: 07/06/20  2:53 PM   Result Value Ref Range    Glucose (POC) 367 (H) 65 - 100 mg/dL    Performed by Rowan Najera        Radiologic Studies -   No orders to display     CT Results  (Last 48 hours)    None        CXR Results  (Last 48 hours)    None            Medical Decision Making   Parveen ARNETT MD am the first provider for this patient and am the attending of record for this patient encounter. I reviewed the vital signs, available nursing notes, past medical history, past surgical history, family history and social history. Vital Signs-Reviewed the patient's vital signs. Patient Vitals for the past 12 hrs:   Temp Pulse Resp BP SpO2   07/06/20 1430  73 18 150/61 98 %   07/06/20 1400  72 18 134/70 98 %   07/06/20 1330  69 18 148/67 99 %   07/06/20 1300  73 21 156/67 99 %   07/06/20 1215  71 16 (!) 168/91 94 %   07/06/20 1145  65 19 149/59 97 %   07/06/20 1115  66 20 134/61 99 %   07/06/20 1100  71 19 131/54 97 %   07/06/20 1030  77 21 131/81 98 %   07/06/20 1000  77 16 143/79 98 %   07/06/20 0930  71 17 133/57 97 %   07/06/20 0915  77 20 139/82 98 %   07/06/20 0900  75 17 138/88 98 %   07/06/20 0845  79 22 143/60 96 %   07/06/20 0830 97.5 °F (36.4 °C) 88 16 147/67 98 %       EKG interpretation: (Preliminary)  Rhythm: normal sinus rhythm; and regular . Rate (approx.): 85; Axis: LADl; P wave: normal; QRS interval: normal ; ST/T wave: normal;  was interpreted by Brenda Gosselin, MD,ED Provider. Records Reviewed: Nursing Notes and Old Medical Records    Provider Notes (Medical Decision Making): This 80-year-old female presenting here hyperglycemic. On arrival history is limited secondary to altered mental status however after 1 amp of D50 had resolution of her symptoms. At that time further history patient had no complaints other than feeling her baseline. Notes that she ate dinner earlier than usual yesterday evening, took her insulin had not eaten anything this morning. Patient was observed in the emergency department and had several subsequent glucoses that showed no recurrence of her hypoglycemia. She was given lunch in her usual dose of afternoon insulin and otherwise felt stable for discharge she is remained asymptomatic so otherwise have no reason to suspect some sort of occult infection or cardiac ischemia. ED Course:   Initial assessment performed.  The patients presenting problems have been discussed, and they are in agreement with the care plan formulated and outlined with them. I have encouraged them to ask questions as they arise throughout their visit. PROGRESS  Deana Chase's  results have been reviewed with her. She has been counseled regarding her diagnosis. She verbally conveys understanding and agreement of the signs, symptoms, diagnosis, treatment and prognosis and additionally agrees to follow up as recommended with Dr. Rimma Sharma MD in 24 - 48 hours. She also agrees with the care-plan and conveys that all of her questions have been answered. I have also put together some discharge instructions for her that include: 1) educational information regarding their diagnosis, 2) how to care for their diagnosis at home, as well a 3) list of reasons why they would want to return to the ED prior to their follow-up appointment, should their condition change. Critical Care Note      IMPENDING DETERIORATION -Metabolic  ASSOCIATED RISK FACTORS - Metabolic changes  MANAGEMENT- Bedside Assessment and Supervision of Care  INTERPRETATION -  Blood Pressure  INTERVENTIONS - Metobolic interventions  CASE REVIEW - Nursing  TREATMENT RESPONSE -Improved and Stable  PERFORMED BY - Self    NOTES   :  I have provided a total of 32 minutes of critical time not including time spent on separately documented procedures. The reason for providing this level of medical care for this critically ill patient was due to a critical illness that impaired one or more vital organ systems such that there was a high probability of imminent or life threatening deterioration in the patients condition. This care involved high complexity decision making to assess, manipulate, and support vital system functions,  lab review, consultations with specialist, family decision- making, bedside attention and documentation.  During this entire length of time I was immediately available to the patient      Disposition:  home    PLAN:  1. Current Discharge Medication List        2. Follow-up Information     Follow up With Specialties Details Why Contact Info    Devon Palafox MD Internal Medicine Schedule an appointment as soon as possible for a visit in 1 day  [address] [phone]      [address] DEPT Emergency Medicine  If symptoms worsen [address] [facility]  [address] [facility]  [phone]        Return to ED if worse     Diagnosis     Clinical Impression:   1. Hypoglycemia        Please note that this dictation was completed with Dragon, computer voice recognition software. Quite often unanticipated grammatical, syntax, homophones, and other interpretive errors are inadvertently transcribed by the computer software. Please disregard these errors. Additionally, please excuse any errors that have escaped final proofreading.

## 2020-07-06 NOTE — ED NOTES
Pt. Resting comfortably in bed at this time with call bell in reach. Pt. Awaiting transportation at this time.

## 2020-07-06 NOTE — ED NOTES
Pt. Resting comfortably in bed at this time with call bell in reach. Pt. With no complaints at this time.

## 2020-07-06 NOTE — ED NOTES
Pt. Presents to ED today for complaints of low blood sugar. Pt. With extensive history of blood sugar issues as her \"special needs\" son handles her medications. Upon arrival patient lethargic but arouses to voice.

## 2020-07-06 NOTE — DISCHARGE INSTRUCTIONS
Patient Education        Hypoglycemia: Care Instructions  Your Care Instructions     Hypoglycemia means that your blood sugar is low and your body is not getting enough fuel. Some people get low blood sugar from not eating often enough. Some medicines to treat diabetes can cause low blood sugar. People who have had surgery on their stomachs or intestines may get hypoglycemia. Problems with the pancreas, kidneys, or liver also can cause low blood sugar. A snack or drink with sugar in it will raise your blood sugar and should ease your symptoms right away. Your doctor may recommend that you change or stop your medicines until you can get your blood sugar levels under control. In the long run, you may need to change your diet and eating habits so that you get enough fuel for your body throughout the day. Follow-up care is a key part of your treatment and safety. Be sure to make and go to all appointments, and call your doctor if you are having problems. It's also a good idea to know your test results and keep a list of the medicines you take. How can you care for yourself at home? · Learn to recognize the early signs of low blood sugar. Signs include:  ? Nausea. ? Hunger. ? Feeling nervous, irritable, or shaky. ? Cold, clammy, wet skin. ? Sweating (when you are not exercising). ? A fast heartbeat.  ? Numbness or tingling of the fingertips or lips. · If you feel an episode of low blood sugar coming on, eat or drink a quick-sugar food. Some examples of quick-sugar foods are glucose tablets, table sugar, hard candy (such as Life Savers), fruit juice, and regular (not diet) soda. · Eat small, frequent meals so that you do not get too hungry between meals. · Balance extra exercise with eating more. · Keep a written record of your low blood sugar episodes, including when you last ate and what you ate, so that you can learn what causes your blood sugar to drop.   · Make sure your family, friends, and coworkers know the symptoms of low blood sugar and know what to do to get your sugar level up. · Wear medical alert jewelry that lists your condition. You can buy this at most drugstores. When should you call for help? [id number] anytime you think you may need emergency care. For example, call if:  · You passed out (lost consciousness). · You are confused or cannot think clearly. · Your blood sugar is very high or very low. Watch closely for changes in your health, and be sure to contact your doctor if:  · Your blood sugar stays outside the level your doctor set for you. · You have any problems. Where can you learn more? Go to http://toi-sunil.info/  Enter R955 in the search box to learn more about \"Hypoglycemia: Care Instructions. \"  Current as of: December 20, 2019               Content Version: 12.5  © 3742-0469 [employer]. Care instructions adapted under license by [employer] (which disclaims liability or warranty for this information). If you have questions about a medical condition or this instruction, always ask your healthcare professional. [address] any warranty or liability for your use of this information.

## 2020-07-06 NOTE — PROGRESS NOTES
Pharmacy Clarification of Prior to Admission Medication Regimen     The patient was interviewed regarding clarification of the prior to admission medication regimen. Patient present in room and obtained permission from patient to discuss drug regimen with visitor(s) present. Patient was questioned regarding use of any other inhalers, topical products, over the counter medications, herbal medications, vitamin products or ophthalmic/nasal/otic medication use. Information Obtained From: Patient Interview, Patient's Daughter, [facility], RX Query    Pertinent Pharmacy Findings:  Updated patients preferred outpatient pharmacy to: [facility]  Called Patient's Daughter, Love Simmons, regarding metoprolol, daughter informed patient is currently on medication. Daughter confused on which insulin was covered by patients insurance, stated she believed patient uses Novolog, called patient's [facility] pharmacy, spoke to pharmacist Odell Severs, pharmacist confirmed patient last picked up Humalog insulin 6/2020 due to Novolog not being covered. Patient does NOT take Pravastatin daily as written on prescription, sometimes forgets to take medication. PTA medication list was corrected to the following:     Prior to Admission Medications   Prescriptions Last Dose Informant Taking? Blood-Glucose Meter (ONETOUCH ULTRA2 METER) misc   No   Sig: USE TO CHECK BLOOD SUGARS DAILY. amLODIPine (NORVASC) 10 mg tablet 7/5/2020 at Unknown time Self Yes   Sig: TAKE 1 TABLET BY MOUTH EVERY DAY IN THE MORNING   brimonidine (ALPHAGAN) 0.15 % ophthalmic solution 7/5/2020 at Unknown time Self Yes   Sig: Administer 1 Drop to both eyes two (2) times a day.    clopidogreL (PLAVIX) 75 mg tab 7/5/2020 at Unknown time Self Yes   Sig: TAKE 1 TABLET BY MOUTH EVERY DAY   flash glucose scanning reader (FREESTYLE KEVON 14 DAY READER) [facility]   No   Sig: As directed1   flash glucose sensor (FREESTYLE KEVON 14 DAY SENSOR) kit   No   Sig: Continuous monitoring glucose blood VI test strips (ONETOUCH ULTRA BLUE TEST STRIP) strip   No   Sig: USE TO CHECK BLOOD SUGARS DAILY. Dx: E11.649   insulin degludec Dary Clunes FlexTouch U-100) 100 unit/mL (3 mL) inpn 2020 at Unknown time Self Yes   Si Units by SubCUTAneous route daily.    insulin lispro (HUMALOG) 100 unit/mL kwikpen 2020 at Unknown time Self Yes   Si units before breakfast and lunch, 2 units before dinner   levothyroxine (SYNTHROID) 175 mcg tablet 2020 at Unknown time Self Yes   Sig: TAKE 1 TABLET BY MOUTH EVERY DAY   metoprolol succinate (TOPROL-XL) 25 mg XL tablet 2020 at Unknown time Child Yes   Sig: TAKE 1 TABLET BY MOUTH EVERY DAY   pravastatin (PRAVACHOL) 80 mg tablet 2020 at Unknown time Self Yes   Sig: TAKE 1 TABLET BY MOUTH at bedtime      Facility-Administered Medications: None          Thank you,  Mily GRANGER Do, CPhT  Medication History Technician

## 2020-07-06 NOTE — ED NOTES
Pt. Jerald Rios to await transportation. Pt. Dressed, leads, and IV removed. Pt. Stable for discharge when transportation arrives.

## 2020-07-07 ENCOUNTER — PATIENT OUTREACH (OUTPATIENT)
Dept: CASE MANAGEMENT | Age: 80
End: 2020-07-07

## 2020-07-09 ENCOUNTER — PATIENT OUTREACH (OUTPATIENT)
Dept: CASE MANAGEMENT | Age: 80
End: 2020-07-09

## 2020-07-15 RX ORDER — PEN NEEDLE, DIABETIC 30 GX3/16"
NEEDLE, DISPOSABLE MISCELLANEOUS
Qty: 1 PACKAGE | Refills: 11 | Status: SHIPPED | OUTPATIENT
Start: 2020-07-15 | End: 2020-09-01

## 2020-07-16 ENCOUNTER — HOME CARE VISIT (OUTPATIENT)
Dept: HOME HEALTH SERVICES | Facility: HOME HEALTH | Age: 80
End: 2020-07-16

## 2020-07-21 NOTE — PROGRESS NOTES
Goals Addressed                 This Visit's Progress     Supportive resources: medicaid assistance and options for caregiver support          7/6/20  SW received referral from CTN requesting a follow up call with patient's daughter to offer assistance/support with establishing personal care and assistance for patient. Per CTN, a UAI screening was completed while pt was hospitalized and daughter was working with [facility] program to establish day program/Medicaid. SW left voice mails requesting a call back. SW will follow up once call is returned  Ebenezer Yost, [address] [place] [address] Ambulatory Care Coordination Team  [phone]      2/26/2020   CT SW received a call from daughter discussing pt's plan. Daughter stated that she is having difficulty with getting in contact with [facility] to initiate UAI screening for personal care. SW reviewed number that was provided and encouraged her to make another outreach attempt. SW also provided her with pt's [employer] CM contact information and explained the purpose/benefits for CM services through insurance provider. SW emailed her a blank ABIEL form to complete and fax to [facility] giving her permission to communicate on pt's behalf. She reported that pt is doing well and she is planning to assist her to PCP f/u appointment tomorrow. SW will follow up with daughter at a later date. [place] [address] Ambulatory Care Coordination Team  [phone]    2/25/20  CT YOLANDA left a message with patient's daughter requesting a call back. YOLANDA will provide daughter with pt's [employer]  name and number and discuss the process of submitting an authorization release to [facility] on behalf of the patient for caregiver purposes.     2/21/20  CT YOLANDA contacted patient's daughter and advised her to contact Tyro CECILY to initiate Uniform Assessment Instrument screening for personal care/LTC assistance. Daughter reported that she submitted a medicaid application. Daughter in agreement with receiving assistance from PharmD for Diabetes education/tx options for patient. YOLANDA will update Sarahy Rivera PharmD of this request.  Kaylan Rivero will follow up at a later date to inquire about progress and offer assistance if needed. [place] [address] Ambulatory Care Coordination Team  [phone]    2/20/20  CT YOLANDA was notified of patient's admission to [facility]. YOLANDA left a message with pt's  Bri Gerber requesting a uniform assessment screening for personal care/LTC services. SW will attempt contact at a later date if call is not returned. Daughter was also made aware of the UAI request.      2/18/20  YOLANDA discussed with daughter about the purpose and benefit of receiving Diabetes education from [facility] [facility]. Daughter stated that she understands how to manage pt's medical condition, but the challenge is ensuring that pt eat her meals and take medications consistently. Daughter stated that she calls her mom several times during the day to remind her to take meds, eat meals etc.  SW discussed the option of patient attending an adult  program during the week to assist with needs until her personal care is established. Daughter stated that pt would not be interested in attending but she will share this with pt as an option. YOLANDA contacted  DSS to iniate the UAI screening for personal care. YOLANDA advised daughter to contact Mrs. Margarita Smith at [facility] [phone] to provide additional information for screening. [place] [address] Ambulatory Care Coordination Team  [phone]    2/17/20  YOLANDA contacted daughter for f/u. Daughter reported that she/pt submitted the medicaid application.   YOLANDA explained to daughter the f/u process and services offered though medicaid. SW/daughter also discussed the benefit of having pt's medications bubbled pack and advised her to contact pharmacy to inquire about the process. SW will f/u with Elsie Hernandez RD to coordinate a meeting for pt/daughter to receive diabetes education. SW will f/u at a later date. [place] [address] Ambulatory Care Coordination Team  [phone]  1/31/2020   SW attempted to contacted patient's daughter to follow up on progress made with completing medicaid application and offer assistance. SW left a voicemail requesting a call back. [place] [address] Ambulatory Care Coordination Team  [phone]    1/15/2020   CTSATNAM SW received a call back from [place] [address] providing an update on patients home visit. SW met with patient in the home and spoke with the daughter via phone during the visit. Ritika Denney reported there were no concerns observed during the visit. Ritika Denney reported that patient was alert, verbal and oriented during the visit, as she appropriately shared information and answered questions. However, It is noted that [facility] created goals and interventions to address patients cognitive impairment. Ritika Denney discussed with daughter the importance of having Medicaid coverage in place, as it could provide personal care aides to assist patient in the home. SW explained to daughter the concerns that were shared regarding patients care in the home and offered to schedule a date and time to meet with her to complete a Medicaid application and submit a request for UAI screening, but daughter was not able to confirm a date.  YOLANDA will follow up with daughter at a later date to schedule meeting. See [facility] YOLANDA note.    [place] [address] Ambulatory Care Coordination Team  [phone]    1/10/2020 3:00pm  CTN SW received a call back from Nolan Dominguez. CTN YOLANDA updated her on the concerns related to patient's care and safety in the home. 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 According to patients daughter, patient owns her home and the son lives with her. Patients daughter is involved with her care but does not live with her. ALMA DELIA GUERRERO made aware of the resources that were provided to daughter regarding the Medicaid process and discussed the possibility of patient participating in [facility] chronic care program for disease management. ALMA DELIA GUERRERO is scheduled to conduct a home visit with patient next week. Due to patients cognition limitations, YOLANDA suggested that [place] SW contact patients son/daughter to schedule a home visit. MARLEN GUERRERO mentioned, pending the outcome of home assessment there may be a need for an APS referral. ALMA DELIA GUERRERO acknowledged the report given and will follow up with MARLEN GUERRERO at a later date to discuss findings. [place] [address] Ambulatory Care Coordination Team  [phone]    1/9/2020 2:30am  [facility], reported that patient is scheduled to receive a home visit from [address] [place] [address] on 1-13-20. MARLEN GUERRERO called home health SW, Nolan Tuttle requesting a call back to discuss concerns reg patient's care/safety and address social needs. SW will attempt call at a later date if call is not returned. [place] [address] Ambulatory Care Coordination Team  [phone]    1/7/2020  1:00pm  It is noted that patient was recently discharged from the hospital due to DKA. SW contacted patient's daughter, Alisa Mccarthy, to discuss resources to address patient's needs.   Daughter stated that she was busy, but was receptive to taking a few minutes to discuss options related to patient's care. SW explained to daughter the Medicaid process along with the benefits of Medicaid coverage. SW highlighted a few Medicaid services that patient could receive if approved, such as personal care, adult  coverage, assistance with medication cost and alternative placement if needed. Daughter acknowledged the services and stated that her goal is to care for patient in the home. Patient's son live with her and daughter assist with providing care. SW advised daughter to contact [address] [place] [place] [address] to initiate the application process, as it could take up to 45 days for Medicaid approval.  Daughter understood and stated she and patient will complete the application by the end of the week. Daughter denied any additional resources at this time. SW provided daughter my contact information to contact if needed      SW will contact daughter within the next two weeks to assess progress made with completing application, discuss ACP items and offer assistance if needed. [address] [facility] Team   [facility] Ambulatory Care Coordination Team  [phone]     12/17/2019  1:30pm  SW attempted to contact patient's daughter for follow up. SW left a detailed voicemail requesting a call back, and suggested that she leave on my voicemail best time/date to reach her. SW will follow up at a later date. 12/13/2019  1:25pm  Patient's daughter requested assistance with understanding medicaid process and initiating application. SW contacted daughter however, she reported that she was unable to talk and stated she would contact SW [phone] at 1:30pm.      Emelia Mendoza will attempt contact on 12-16-19 if call is not returned. YOLANDA will offer assistance with medicaid process and additional resources if needed.         [address] [facility] Team

## 2020-07-29 ENCOUNTER — PATIENT OUTREACH (OUTPATIENT)
Dept: CASE MANAGEMENT | Age: 80
End: 2020-07-29

## 2020-07-29 NOTE — PROGRESS NOTES
7/29/20 -  Call to check on pt - HH noted to discharge skilled nursing on 7/16 - Pt missed her pcp appt yesterday - Dr. Jessica Gamble -    Call to and reached pt's daughter - she said she and her brother are overseeing and administering the medications for Ms. Stewart You now - they set them out - they call her to remind her to take them. Ms. Stewart You lives with her son - (with disability) and her daughter goes by each day -     Last ED visit was 7/6- and some frequency before that time - hopefully the medication management is helping -     CTN engaged LCSW who has worked with this family multiple times - Review of LCSW notes-   Closing ALEKSANDAR - with what seems to be some stabilization for Ms. Stewart You - with her glucose management. Pt's daughter has been counseled re: concerns about pt being alone, taking meds; and safety issues.     Jabari Pulido RN, [facility], [facility]  Care transitions nurse [phone]  [facility] Coordination Team

## 2020-08-07 NOTE — ED NOTES
INVALID auth recv'd. Emailed pt new auth explaining a physical signature is needed.   Pt given sandwich to eat while waiting for the results of CBC.

## 2020-08-25 ENCOUNTER — APPOINTMENT (OUTPATIENT)
Dept: GENERAL RADIOLOGY | Age: 80
DRG: 637 | End: 2020-08-25
Attending: STUDENT IN AN ORGANIZED HEALTH CARE EDUCATION/TRAINING PROGRAM
Payer: MEDICARE

## 2020-08-25 ENCOUNTER — HOSPITAL ENCOUNTER (INPATIENT)
Age: 80
LOS: 4 days | Discharge: HOME | DRG: 637 | End: 2020-08-29
Attending: EMERGENCY MEDICINE | Admitting: INTERNAL MEDICINE
Payer: MEDICARE

## 2020-08-25 ENCOUNTER — APPOINTMENT (OUTPATIENT)
Dept: CT IMAGING | Age: 80
DRG: 637 | End: 2020-08-25
Attending: STUDENT IN AN ORGANIZED HEALTH CARE EDUCATION/TRAINING PROGRAM
Payer: MEDICARE

## 2020-08-25 DIAGNOSIS — R41.82 ALTERED MENTAL STATUS, UNSPECIFIED ALTERED MENTAL STATUS TYPE: ICD-10-CM

## 2020-08-25 DIAGNOSIS — A41.9 SEPTIC SHOCK (HCC): Primary | ICD-10-CM

## 2020-08-25 DIAGNOSIS — E13.11 DIABETIC KETOACIDOSIS WITH COMA ASSOCIATED WITH OTHER SPECIFIED DIABETES MELLITUS (HCC): ICD-10-CM

## 2020-08-25 DIAGNOSIS — I95.9 HYPOTENSION, UNSPECIFIED HYPOTENSION TYPE: ICD-10-CM

## 2020-08-25 DIAGNOSIS — R65.21 SEPTIC SHOCK (HCC): Primary | ICD-10-CM

## 2020-08-25 DIAGNOSIS — E87.5 ACUTE HYPERKALEMIA: ICD-10-CM

## 2020-08-25 DIAGNOSIS — N17.9 AKI (ACUTE KIDNEY INJURY) (HCC): ICD-10-CM

## 2020-08-25 DIAGNOSIS — E86.0 SEVERE DEHYDRATION: ICD-10-CM

## 2020-08-25 DIAGNOSIS — Z71.89 GOALS OF CARE, COUNSELING/DISCUSSION: ICD-10-CM

## 2020-08-25 DIAGNOSIS — E87.20 LACTIC ACID ACIDOSIS: ICD-10-CM

## 2020-08-25 DIAGNOSIS — G93.41 ACUTE METABOLIC ENCEPHALOPATHY: ICD-10-CM

## 2020-08-25 DIAGNOSIS — Z71.89 DNR (DO NOT RESUSCITATE) DISCUSSION: ICD-10-CM

## 2020-08-25 LAB
ALBUMIN SERPL-MCNC: 4.1 G/DL (ref 3.5–5)
ALBUMIN/GLOB SERPL: 1.1 {RATIO} (ref 1.1–2.2)
ALP SERPL-CCNC: 177 U/L (ref 45–117)
ALT SERPL-CCNC: 67 U/L (ref 12–78)
AMMONIA PLAS-SCNC: 54 UMOL/L
ANION GAP SERPL CALC-SCNC: 23 MMOL/L (ref 5–15)
ANION GAP SERPL CALC-SCNC: 29 MMOL/L (ref 5–15)
ANION GAP SERPL CALC-SCNC: ABNORMAL MMOL/L (ref 5–15)
ANION GAP SERPL CALC-SCNC: ABNORMAL MMOL/L (ref 5–15)
APPEARANCE UR: CLEAR
ARTERIAL PATENCY WRIST A: ABNORMAL
AST SERPL-CCNC: 47 U/L (ref 15–37)
BACTERIA URNS QL MICRO: NEGATIVE /HPF
BASOPHILS # BLD: 0 K/UL (ref 0–0.1)
BASOPHILS NFR BLD: 0 % (ref 0–1)
BDY SITE: ABNORMAL
BILIRUB SERPL-MCNC: 0.6 MG/DL (ref 0.2–1)
BILIRUB UR QL: NEGATIVE
BUN SERPL-MCNC: 34 MG/DL (ref 6–20)
BUN SERPL-MCNC: 34 MG/DL (ref 6–20)
BUN SERPL-MCNC: 35 MG/DL (ref 6–20)
BUN SERPL-MCNC: 35 MG/DL (ref 6–20)
BUN/CREAT SERPL: 20 (ref 12–20)
BUN/CREAT SERPL: 21 (ref 12–20)
CA-I BLD-SCNC: 1.16 MMOL/L (ref 1.12–1.32)
CALCIUM SERPL-MCNC: 8.2 MG/DL (ref 8.5–10.1)
CALCIUM SERPL-MCNC: 8.3 MG/DL (ref 8.5–10.1)
CALCIUM SERPL-MCNC: 8.3 MG/DL (ref 8.5–10.1)
CALCIUM SERPL-MCNC: 9.2 MG/DL (ref 8.5–10.1)
CHLORIDE SERPL-SCNC: 101 MMOL/L (ref 97–108)
CHLORIDE SERPL-SCNC: 106 MMOL/L (ref 97–108)
CHLORIDE SERPL-SCNC: 113 MMOL/L (ref 97–108)
CHLORIDE SERPL-SCNC: 96 MMOL/L (ref 97–108)
CO2 SERPL-SCNC: 5 MMOL/L (ref 21–32)
CO2 SERPL-SCNC: 7 MMOL/L (ref 21–32)
CO2 SERPL-SCNC: <5 MMOL/L (ref 21–32)
CO2 SERPL-SCNC: <5 MMOL/L (ref 21–32)
COLOR UR: ABNORMAL
COMMENT, HOLDF: NORMAL
COMMENT, HOLDF: NORMAL
COVID-19 RAPID TEST, COVR: NOT DETECTED
CREAT SERPL-MCNC: 1.6 MG/DL (ref 0.55–1.02)
CREAT SERPL-MCNC: 1.67 MG/DL (ref 0.55–1.02)
CREAT SERPL-MCNC: 1.67 MG/DL (ref 0.55–1.02)
CREAT SERPL-MCNC: 1.69 MG/DL (ref 0.55–1.02)
DIFFERENTIAL METHOD BLD: ABNORMAL
EOSINOPHIL # BLD: 0 K/UL (ref 0–0.4)
EOSINOPHIL NFR BLD: 0 % (ref 0–7)
EPITH CASTS URNS QL MICRO: ABNORMAL /LPF
ERYTHROCYTE [DISTWIDTH] IN BLOOD BY AUTOMATED COUNT: 13.3 % (ref 11.5–14.5)
EST. AVERAGE GLUCOSE BLD GHB EST-MCNC: 306 MG/DL
EST. AVERAGE GLUCOSE BLD GHB EST-MCNC: 312 MG/DL
GAS FLOW.O2 O2 DELIVERY SYS: ABNORMAL L/MIN
GLOBULIN SER CALC-MCNC: 3.6 G/DL (ref 2–4)
GLUCOSE BLD STRIP.AUTO-MCNC: 317 MG/DL (ref 65–100)
GLUCOSE BLD STRIP.AUTO-MCNC: 410 MG/DL (ref 65–100)
GLUCOSE BLD STRIP.AUTO-MCNC: 480 MG/DL (ref 65–100)
GLUCOSE BLD STRIP.AUTO-MCNC: 512 MG/DL (ref 65–100)
GLUCOSE BLD STRIP.AUTO-MCNC: >600 MG/DL (ref 65–100)
GLUCOSE SERPL-MCNC: 372 MG/DL (ref 65–100)
GLUCOSE SERPL-MCNC: 652 MG/DL (ref 65–100)
GLUCOSE SERPL-MCNC: 732 MG/DL (ref 65–100)
GLUCOSE SERPL-MCNC: 794 MG/DL (ref 65–100)
GLUCOSE UR STRIP.AUTO-MCNC: >1000 MG/DL
HBA1C MFR BLD: 12.3 % (ref 4–5.6)
HBA1C MFR BLD: 12.5 % (ref 4–5.6)
HCT VFR BLD AUTO: 41.5 % (ref 35–47)
HEALTH STATUS, XMCV2T: NORMAL
HGB BLD-MCNC: 12.8 G/DL (ref 11.5–16)
HGB UR QL STRIP: NEGATIVE
HYALINE CASTS URNS QL MICRO: ABNORMAL /LPF (ref 0–5)
IMM GRANULOCYTES # BLD AUTO: 0.3 K/UL (ref 0–0.04)
IMM GRANULOCYTES NFR BLD AUTO: 2 % (ref 0–0.5)
KETONES UR QL STRIP.AUTO: 80 MG/DL
LACTATE SERPL-SCNC: 4.9 MMOL/L (ref 0.4–2)
LACTATE SERPL-SCNC: 5.2 MMOL/L (ref 0.4–2)
LEUKOCYTE ESTERASE UR QL STRIP.AUTO: NEGATIVE
LYMPHOCYTES # BLD: 1.4 K/UL (ref 0.8–3.5)
LYMPHOCYTES NFR BLD: 9 % (ref 12–49)
MAGNESIUM SERPL-MCNC: 2.2 MG/DL (ref 1.6–2.4)
MAGNESIUM SERPL-MCNC: 2.6 MG/DL (ref 1.6–2.4)
MAGNESIUM SERPL-MCNC: 2.6 MG/DL (ref 1.6–2.4)
MAGNESIUM SERPL-MCNC: 2.7 MG/DL (ref 1.6–2.4)
MCH RBC QN AUTO: 31.1 PG (ref 26–34)
MCHC RBC AUTO-ENTMCNC: 30.8 G/DL (ref 30–36.5)
MCV RBC AUTO: 101 FL (ref 80–99)
MONOCYTES # BLD: 1.4 K/UL (ref 0–1)
MONOCYTES NFR BLD: 9 % (ref 5–13)
NEUTS SEG # BLD: 12.1 K/UL (ref 1.8–8)
NEUTS SEG NFR BLD: 80 % (ref 32–75)
NITRITE UR QL STRIP.AUTO: NEGATIVE
NRBC # BLD: 0 K/UL (ref 0–0.01)
NRBC BLD-RTO: 0 PER 100 WBC
O2/TOTAL GAS SETTING VFR VENT: 21 %
PCO2 BLD: <15 MMHG (ref 35–45)
PH BLD: 6.9 [PH] (ref 7.35–7.45)
PH UR STRIP: 5 [PH] (ref 5–8)
PHOSPHATE SERPL-MCNC: 8.2 MG/DL (ref 2.6–4.7)
PHOSPHATE SERPL-MCNC: 8.9 MG/DL (ref 2.6–4.7)
PLATELET # BLD AUTO: 299 K/UL (ref 150–400)
PMV BLD AUTO: 10.8 FL (ref 8.9–12.9)
PO2 BLD: 117 MMHG (ref 80–100)
POTASSIUM SERPL-SCNC: 3.2 MMOL/L (ref 3.5–5.1)
POTASSIUM SERPL-SCNC: 4.2 MMOL/L (ref 3.5–5.1)
POTASSIUM SERPL-SCNC: 6 MMOL/L (ref 3.5–5.1)
POTASSIUM SERPL-SCNC: 6.3 MMOL/L (ref 3.5–5.1)
PROT SERPL-MCNC: 7.7 G/DL (ref 6.4–8.2)
PROT UR STRIP-MCNC: 30 MG/DL
RBC # BLD AUTO: 4.11 M/UL (ref 3.8–5.2)
RBC #/AREA URNS HPF: ABNORMAL /HPF (ref 0–5)
RBC MORPH BLD: ABNORMAL
SAMPLES BEING HELD,HOLD: NORMAL
SAMPLES BEING HELD,HOLD: NORMAL
SERVICE CMNT-IMP: ABNORMAL
SODIUM SERPL-SCNC: 130 MMOL/L (ref 136–145)
SODIUM SERPL-SCNC: 134 MMOL/L (ref 136–145)
SODIUM SERPL-SCNC: 139 MMOL/L (ref 136–145)
SODIUM SERPL-SCNC: 143 MMOL/L (ref 136–145)
SOURCE, COVRS: NORMAL
SP GR UR REFRACTOMETRY: 1.02 (ref 1–1.03)
SPECIMEN SOURCE, FCOV2M: NORMAL
SPECIMEN TYPE, XMCV1T: NORMAL
SPECIMEN TYPE: ABNORMAL
TOTAL RESP. RATE, ITRR: 28
TROPONIN I SERPL-MCNC: <0.05 NG/ML
UA: UC IF INDICATED,UAUC: ABNORMAL
UROBILINOGEN UR QL STRIP.AUTO: 0.2 EU/DL (ref 0.2–1)
WBC # BLD AUTO: 15.2 K/UL (ref 3.6–11)
WBC URNS QL MICRO: ABNORMAL /HPF (ref 0–4)

## 2020-08-25 PROCEDURE — 74011000258 HC RX REV CODE- 258: Performed by: EMERGENCY MEDICINE

## 2020-08-25 PROCEDURE — 36600 WITHDRAWAL OF ARTERIAL BLOOD: CPT

## 2020-08-25 PROCEDURE — 74011250636 HC RX REV CODE- 250/636: Performed by: EMERGENCY MEDICINE

## 2020-08-25 PROCEDURE — 83605 ASSAY OF LACTIC ACID: CPT

## 2020-08-25 PROCEDURE — 74011000258 HC RX REV CODE- 258: Performed by: INTERNAL MEDICINE

## 2020-08-25 PROCEDURE — 65660000001 HC RM ICU INTERMED STEPDOWN

## 2020-08-25 PROCEDURE — 84100 ASSAY OF PHOSPHORUS: CPT

## 2020-08-25 PROCEDURE — 81001 URINALYSIS AUTO W/SCOPE: CPT

## 2020-08-25 PROCEDURE — 75810000455 HC PLCMT CENT VENOUS CATH LVL 2 5182

## 2020-08-25 PROCEDURE — 80053 COMPREHEN METABOLIC PANEL: CPT

## 2020-08-25 PROCEDURE — 70450 CT HEAD/BRAIN W/O DYE: CPT

## 2020-08-25 PROCEDURE — 74011250637 HC RX REV CODE- 250/637: Performed by: EMERGENCY MEDICINE

## 2020-08-25 PROCEDURE — 93005 ELECTROCARDIOGRAM TRACING: CPT

## 2020-08-25 PROCEDURE — 74011250636 HC RX REV CODE- 250/636: Performed by: INTERNAL MEDICINE

## 2020-08-25 PROCEDURE — 99285 EMERGENCY DEPT VISIT HI MDM: CPT

## 2020-08-25 PROCEDURE — 80047 BASIC METABLC PNL IONIZED CA: CPT

## 2020-08-25 PROCEDURE — 87635 SARS-COV-2 COVID-19 AMP PRB: CPT

## 2020-08-25 PROCEDURE — 74011636637 HC RX REV CODE- 636/637: Performed by: STUDENT IN AN ORGANIZED HEALTH CARE EDUCATION/TRAINING PROGRAM

## 2020-08-25 PROCEDURE — 74011000250 HC RX REV CODE- 250: Performed by: EMERGENCY MEDICINE

## 2020-08-25 PROCEDURE — 84484 ASSAY OF TROPONIN QUANT: CPT

## 2020-08-25 PROCEDURE — 83036 HEMOGLOBIN GLYCOSYLATED A1C: CPT

## 2020-08-25 PROCEDURE — 71045 X-RAY EXAM CHEST 1 VIEW: CPT

## 2020-08-25 PROCEDURE — 74011250637 HC RX REV CODE- 250/637: Performed by: INTERNAL MEDICINE

## 2020-08-25 PROCEDURE — 82140 ASSAY OF AMMONIA: CPT

## 2020-08-25 PROCEDURE — 85025 COMPLETE CBC W/AUTO DIFF WBC: CPT

## 2020-08-25 PROCEDURE — 87040 BLOOD CULTURE FOR BACTERIA: CPT

## 2020-08-25 PROCEDURE — 80048 BASIC METABOLIC PNL TOTAL CA: CPT

## 2020-08-25 PROCEDURE — 74011250636 HC RX REV CODE- 250/636: Performed by: STUDENT IN AN ORGANIZED HEALTH CARE EDUCATION/TRAINING PROGRAM

## 2020-08-25 PROCEDURE — 74011636637 HC RX REV CODE- 636/637: Performed by: INTERNAL MEDICINE

## 2020-08-25 PROCEDURE — 82962 GLUCOSE BLOOD TEST: CPT

## 2020-08-25 PROCEDURE — 96374 THER/PROPH/DIAG INJ IV PUSH: CPT

## 2020-08-25 PROCEDURE — 96375 TX/PRO/DX INJ NEW DRUG ADDON: CPT

## 2020-08-25 PROCEDURE — 74011000258 HC RX REV CODE- 258: Performed by: STUDENT IN AN ORGANIZED HEALTH CARE EDUCATION/TRAINING PROGRAM

## 2020-08-25 PROCEDURE — 74011000250 HC RX REV CODE- 250: Performed by: INTERNAL MEDICINE

## 2020-08-25 PROCEDURE — 82803 BLOOD GASES ANY COMBINATION: CPT

## 2020-08-25 PROCEDURE — 83735 ASSAY OF MAGNESIUM: CPT

## 2020-08-25 PROCEDURE — 36415 COLL VENOUS BLD VENIPUNCTURE: CPT

## 2020-08-25 RX ORDER — ACETAMINOPHEN 650 MG/1
650 SUPPOSITORY RECTAL
Status: DISCONTINUED | OUTPATIENT
Start: 2020-08-25 | End: 2020-08-25

## 2020-08-25 RX ORDER — PRAVASTATIN SODIUM 40 MG/1
40 TABLET ORAL
Status: DISCONTINUED | OUTPATIENT
Start: 2020-08-25 | End: 2020-08-29 | Stop reason: HOSPADM

## 2020-08-25 RX ORDER — MAGNESIUM SULFATE 100 %
4 CRYSTALS MISCELLANEOUS AS NEEDED
Status: DISCONTINUED | OUTPATIENT
Start: 2020-08-25 | End: 2020-08-26

## 2020-08-25 RX ORDER — VANCOMYCIN HYDROCHLORIDE
1250 ONCE
Status: COMPLETED | OUTPATIENT
Start: 2020-08-25 | End: 2020-08-25

## 2020-08-25 RX ORDER — SODIUM BICARBONATE IN D5W 150/1000ML
PLASTIC BAG, INJECTION (ML) INTRAVENOUS CONTINUOUS
Status: DISCONTINUED | OUTPATIENT
Start: 2020-08-25 | End: 2020-08-25

## 2020-08-25 RX ORDER — ONDANSETRON 2 MG/ML
4 INJECTION INTRAMUSCULAR; INTRAVENOUS
Status: DISCONTINUED | OUTPATIENT
Start: 2020-08-25 | End: 2020-08-29 | Stop reason: HOSPADM

## 2020-08-25 RX ORDER — CLOPIDOGREL BISULFATE 75 MG/1
75 TABLET ORAL DAILY
Status: DISCONTINUED | OUTPATIENT
Start: 2020-08-26 | End: 2020-08-29 | Stop reason: HOSPADM

## 2020-08-25 RX ORDER — HEPARIN SODIUM 5000 [USP'U]/ML
5000 INJECTION, SOLUTION INTRAVENOUS; SUBCUTANEOUS EVERY 8 HOURS
Status: DISCONTINUED | OUTPATIENT
Start: 2020-08-25 | End: 2020-08-29 | Stop reason: HOSPADM

## 2020-08-25 RX ORDER — MAGNESIUM SULFATE 100 %
4 CRYSTALS MISCELLANEOUS AS NEEDED
Status: DISCONTINUED | OUTPATIENT
Start: 2020-08-25 | End: 2020-08-29 | Stop reason: HOSPADM

## 2020-08-25 RX ORDER — INSULIN LISPRO 100 [IU]/ML
INJECTION, SOLUTION INTRAVENOUS; SUBCUTANEOUS
Status: DISCONTINUED | OUTPATIENT
Start: 2020-08-25 | End: 2020-08-26

## 2020-08-25 RX ORDER — DEXTROSE 50 % IN WATER (D50W) INTRAVENOUS SYRINGE
25-50 AS NEEDED
Status: DISCONTINUED | OUTPATIENT
Start: 2020-08-25 | End: 2020-08-29 | Stop reason: HOSPADM

## 2020-08-25 RX ORDER — NOREPINEPHRINE BITARTRATE/D5W 8 MG/250ML
2-100 PLASTIC BAG, INJECTION (ML) INTRAVENOUS
Status: DISCONTINUED | OUTPATIENT
Start: 2020-08-25 | End: 2020-08-25

## 2020-08-25 RX ORDER — CALCIUM GLUCONATE 94 MG/ML
2 INJECTION, SOLUTION INTRAVENOUS
Status: COMPLETED | OUTPATIENT
Start: 2020-08-25 | End: 2020-08-25

## 2020-08-25 RX ORDER — POTASSIUM CHLORIDE AND SODIUM CHLORIDE 900; 300 MG/100ML; MG/100ML
INJECTION, SOLUTION INTRAVENOUS CONTINUOUS
Status: DISCONTINUED | OUTPATIENT
Start: 2020-08-25 | End: 2020-08-26

## 2020-08-25 RX ORDER — SODIUM BICARBONATE 1 MEQ/ML
50 SYRINGE (ML) INTRAVENOUS ONCE
Status: COMPLETED | OUTPATIENT
Start: 2020-08-25 | End: 2020-08-25

## 2020-08-25 RX ORDER — BRIMONIDINE TARTRATE 2 MG/ML
1 SOLUTION/ DROPS OPHTHALMIC 2 TIMES DAILY
Status: DISCONTINUED | OUTPATIENT
Start: 2020-08-25 | End: 2020-08-29 | Stop reason: HOSPADM

## 2020-08-25 RX ORDER — SODIUM CHLORIDE 9 MG/ML
150 INJECTION, SOLUTION INTRAVENOUS CONTINUOUS
Status: DISCONTINUED | OUTPATIENT
Start: 2020-08-25 | End: 2020-08-25

## 2020-08-25 RX ORDER — ACETAMINOPHEN 325 MG/1
650 TABLET ORAL
Status: DISCONTINUED | OUTPATIENT
Start: 2020-08-25 | End: 2020-08-29 | Stop reason: HOSPADM

## 2020-08-25 RX ORDER — DEXTROSE 50 % IN WATER (D50W) INTRAVENOUS SYRINGE
25-50 AS NEEDED
Status: DISCONTINUED | OUTPATIENT
Start: 2020-08-25 | End: 2020-08-26

## 2020-08-25 RX ORDER — ACETAMINOPHEN 500 MG
1000 TABLET ORAL
Status: DISCONTINUED | OUTPATIENT
Start: 2020-08-25 | End: 2020-08-25

## 2020-08-25 RX ADMIN — PRAVASTATIN SODIUM 40 MG: 40 TABLET ORAL at 21:25

## 2020-08-25 RX ADMIN — SODIUM CHLORIDE 15.8 UNITS/HR: 9 INJECTION, SOLUTION INTRAVENOUS at 18:35

## 2020-08-25 RX ADMIN — SODIUM CHLORIDE 1000 ML: 900 INJECTION, SOLUTION INTRAVENOUS at 18:43

## 2020-08-25 RX ADMIN — SODIUM BICARBONATE 50 MEQ: 84 INJECTION, SOLUTION INTRAVENOUS at 17:16

## 2020-08-25 RX ADMIN — HEPARIN SODIUM 5000 UNITS: 5000 INJECTION INTRAVENOUS; SUBCUTANEOUS at 18:18

## 2020-08-25 RX ADMIN — PIPERACILLIN AND TAZOBACTAM 3.38 G: 3; .375 INJECTION, POWDER, LYOPHILIZED, FOR SOLUTION INTRAVENOUS at 21:24

## 2020-08-25 RX ADMIN — SODIUM CHLORIDE 500 ML: 900 INJECTION, SOLUTION INTRAVENOUS at 16:12

## 2020-08-25 RX ADMIN — SODIUM CHLORIDE 150 ML/HR: 900 INJECTION, SOLUTION INTRAVENOUS at 18:51

## 2020-08-25 RX ADMIN — SODIUM BICARBONATE 50 MEQ: 84 INJECTION, SOLUTION INTRAVENOUS at 18:18

## 2020-08-25 RX ADMIN — VANCOMYCIN HYDROCHLORIDE 1250 MG: 10 INJECTION, POWDER, LYOPHILIZED, FOR SOLUTION INTRAVENOUS at 16:18

## 2020-08-25 RX ADMIN — CALCIUM GLUCONATE 2 G: 98 INJECTION, SOLUTION INTRAVENOUS at 16:21

## 2020-08-25 RX ADMIN — ACETAMINOPHEN 975 MG: 325 SUPPOSITORY RECTAL at 15:18

## 2020-08-25 RX ADMIN — SODIUM CHLORIDE 1000 ML: 900 INJECTION, SOLUTION INTRAVENOUS at 14:57

## 2020-08-25 RX ADMIN — SODIUM CHLORIDE 21 UNITS/HR: 9 INJECTION, SOLUTION INTRAVENOUS at 21:17

## 2020-08-25 RX ADMIN — SODIUM CHLORIDE 18 UNITS/HR: 9 INJECTION, SOLUTION INTRAVENOUS at 23:25

## 2020-08-25 RX ADMIN — SODIUM CHLORIDE 10.8 UNITS/HR: 9 INJECTION, SOLUTION INTRAVENOUS at 16:35

## 2020-08-25 RX ADMIN — SODIUM CHLORIDE 848 ML: 900 INJECTION, SOLUTION INTRAVENOUS at 18:16

## 2020-08-25 RX ADMIN — POTASSIUM CHLORIDE AND SODIUM CHLORIDE: 900; 300 INJECTION, SOLUTION INTRAVENOUS at 20:42

## 2020-08-25 RX ADMIN — PIPERACILLIN AND TAZOBACTAM 3.38 G: 3; .375 INJECTION, POWDER, LYOPHILIZED, FOR SOLUTION INTRAVENOUS at 15:19

## 2020-08-25 NOTE — ED NOTES
Bedside and Verbal shift change report given to Braulio (oncoming nurse) by Elysia Roy (offgoing nurse).  Report included the following information SBAR, Kardex, ED Summary, Intake/Output, MAR, Recent Results and Cardiac Rhythm ST.

## 2020-08-25 NOTE — ED NOTES
Insulin Drip due to be titrated. EDP Resident at bedside inserting [employer]. Will wait for completion.

## 2020-08-25 NOTE — ED NOTES
Pt remains with a MAP <65. Discussed with Dr. Sridevi Cohn who states he would like a SBP >90. Will DC Levo drip at this time. Will continue to monitor.

## 2020-08-25 NOTE — H&P
Hospitalist Admission Note    NAME: Smiley Stephenson   :  1940   MRN:  [id number]     Date/Time:  2020 5:31 PM    Patient PCP: Gina Ware MD  ______________________________________________________________________  Given the patient's current clinical presentation, I have a high level of concern for decompensation if discharged from the emergency department. Complex decision making was performed, which includes reviewing the patient's available past medical records, laboratory results, and x-ray films. My assessment of this patient's clinical condition and my plan of care is as follows. Assessment / Plan:    DKA/increased anion gap metabolic acidosis due to DKA POA/systemic inflammatory response POA likely due to DKA, rule out [id number]/[id number] metabolic encephalopathy due to DKA  -likely secondary to non-compliance. No sign of MI,  or stroke. -Patient has fever, tachycardia and leukocytosis  -patient had multiple admissions due to brittle diabetes and Lasix month  -Patient presented with pH of 6.9, blood sugar 732, potassium of 6.3, bicarb of 5 and anion gap of 29  -Initial fluid resuscitation given in the ED  -Started on bicarb drip in the ED  -We will correct acidosis till pH of 7 with bicarb drip  -Continue IV fluids per DKA protocol  -IV insulin per DKA protocol  -N.p.o. for now  -Check COVID-19 as patient is fever  -Empirically given vancomycin and Zosyn in the ED  -We will empirically continue Zosyn at this point, no clear source of infection  -De-escalate antibiotics in 24 hours if no clear source of infection identified  -Blood cultures are obtained  -Chest x-ray and urinalysis unremarkable  -BMP every 4  -POC blood sugar every hour  -Change in mental status is likely due to DKA. -Central line placed in the ED  -Frequent neuro exams.   CT head is unremarkable     Lactic acidosis POA  -Type a secondary to dehydration  -Initial lactic acid 5.2  -Follow lactic acid every 6 hours till resolved and stop checking  -IV fluids as above     Acute kidney injury POA/hyperkalemia POA  -Patient presents with creatinine of 1.6, baseline creatinine 0.7  -Prerenal from dehydration  -Continue IV fluids as above  -Follow-up BMP  -Presents with a potassium of 6.3, given IV bicarb in the ED in IV calcium gluconate and insulin  -Started on insulin drip, follow BMP every 4 hours     History of hypothyroidism  -Continue Synthroid at home dose     History of CVA  -Continue Plavix and statins     History of hypertension  -Hold Norvasc and metoprolol for now        Code Status: Full code  Surrogate Decision Maker:  Current with previous documentation son is the surrogate decision maker.     DVT Prophylaxis: Heparin  GI Prophylaxis: not indicated     Baseline: Active and she lives by herself    Multiple admissions in the last year due to brittle diabetes      Subjective:   CHIEF COMPLAINT: High blood sugars and altered mental status    HISTORY OF PRESENT ILLNESS:     Clayton Davidson is a 67 y.o.  female past medical history significant for type I brittle diabetes, hypothyroidism, CVA, dyslipidemia and CHF who had multiple admissions due to brittle diabetes including admissions for DKA and hypoglycemia last 1 year. She was brought in the EMS due to the complaints of high blood sugars. Patient is extremely lethargic and unable to provide any history and apparently EMS was called as her blood sugars were reading high. She was noted to be intermittently confused by the EMS and noted to have fever of 101. Patient was brought in the ED where she was noted to be febrile and having severe metabolic acidosis due to DKA. She was given fluid resuscitation and started on insulin drip in the ED. She was given IV bicarb for hyperkalemia and acidosis. Central line was placed in the ED. Reliable review of symptoms could not be obtained due to  altered mental status.   CT head, chest x-ray and urinalysis unremarkable    We were asked to admit for work up and evaluation of the above problems. Past Medical History:   Diagnosis Date    Heart failure ([address]     unknown to family    Hypertension     Stroke [facility])         Past Surgical History:   Procedure Laterality Date    HX GYN      HX HEENT      thyroidectomy    HX HYSTERECTOMY      REMOVAL GALLBLADDER      THYROIDECTOMY         Social History     Tobacco Use    Smoking status: Never Smoker    Smokeless tobacco: Never Used   Substance Use Topics    Alcohol use: No     Comment: been years        Family History   Problem Relation Age of Onset    Diabetes Father     No Known Problems Mother    Reviewed  No Known Allergies     Prior to Admission medications    Medication Sig Start Date End Date Taking? Authorizing Provider   Insulin Needles, Disposable, (BD Ultra-Fine Short Pen Needle) 31 gauge x 5/16\" ndle USE AS DIRECTED TWICE A DAY. 7/15/20   Tamia Young MD   insulin degludec Vivia Solum FlexTouch U-100) 100 unit/mL (3 mL) inpn 12 Units by SubCUTAneous route daily.  6/27/20   Aurora Langley MD   insulin lispro (HUMALOG) 100 unit/mL kwikpen 4 units before breakfast and lunch, 2 units before dinner 6/27/20   Aurora Langley MD   metoprolol succinate (TOPROL-XL) 25 mg XL tablet TAKE 1 TABLET BY MOUTH EVERY DAY 5/11/20   Bal Bautista MD   pravastatin (PRAVACHOL) 80 mg tablet TAKE 1 TABLET BY MOUTH at bedtime 2/27/20   Bal Bautista MD   levothyroxine (SYNTHROID) 175 mcg tablet TAKE 1 TABLET BY MOUTH EVERY DAY 2/27/20   Bal Bautista MD   clopidogreL (PLAVIX) 75 mg tab TAKE 1 TABLET BY MOUTH EVERY DAY 2/27/20   Bal Bautista MD   amLODIPine (NORVASC) 10 mg tablet TAKE 1 TABLET BY MOUTH EVERY DAY IN THE MORNING 2/27/20   Bal Bautista MD   flash glucose scanning reader (FREESTYLE KEVON 14 DAY READER) [facility] As directed1 2/27/20   Bal Bautista MD   flash glucose sensor (FREESTYLE KEVON 14 DAY SENSOR) kit Continuous monitoring 2/27/20   Nadeem Flannery MD   glucose blood VI test strips (ONETOUCH ULTRA BLUE TEST STRIP) strip USE TO CHECK BLOOD SUGARS DAILY. Dx: E11.649 2/25/20   Nadeem Flannery MD   Blood-Glucose Meter (ONETOUCH ULTRA2 METER) misc USE TO CHECK BLOOD SUGARS DAILY. 2/25/20   Nadeem Flannery MD   brimonidine (ALPHAGAN) 0.15 % ophthalmic solution Administer 1 Drop to both eyes two (2) times a day. 1/30/15   Provider, Historical       REVIEW OF SYSTEMS:     I am not able to complete the review of systems because:    The patient is intubated and sedated   x The patient has altered mental status due to his acute medical problems    The patient has baseline aphasia from prior stroke(s)    The patient has baseline dementia and is not reliable historian    The patient is in acute medical distress and unable to provide information           Total of 12 systems reviewed as follows:       POSITIVE= underlined text  Negative = text not underlined  General:  fever, chills, sweats, generalized weakness, weight loss/gain,      loss of appetite   Eyes:    blurred vision, eye pain, loss of vision, double vision  ENT:    rhinorrhea, pharyngitis   Respiratory:   cough, sputum production, SOB, AGUILERA, wheezing, pleuritic pain   Cardiology:   chest pain, palpitations, orthopnea, PND, edema, syncope   Gastrointestinal:  abdominal pain , N/V, diarrhea, dysphagia, constipation, bleeding   Genitourinary:  frequency, urgency, dysuria, hematuria, incontinence   Muskuloskeletal :  arthralgia, myalgia, back pain  Hematology:  easy bruising, nose or gum bleeding, lymphadenopathy   Dermatological: rash, ulceration, pruritis, color change / jaundice  Endocrine:   hot flashes or polydipsia   Neurological:  headache, dizziness, confusion, focal weakness, paresthesia,     Speech difficulties, memory loss, gait difficulty  Psychological: Feelings of anxiety, depression, agitation    Objective:   VITALS:    Visit Vitals  BP (!) 115/29 Pulse (!) 109   Temp (!) 101 °F (38.3 °C)   Resp 29   Ht 5' 3\" (1.6 m)   Wt 59.7 kg (131 lb 9.8 oz)   SpO2 100%   BMI 23.31 kg/m²       PHYSICAL EXAM:    General:    Patient is extremely lethargic with Kussmaul breathing     HEENT: Atraumatic, anicteric sclerae, pink conjunctivae     No oral ulcers, oral mucosa is extremely dry. , dentition fair  Neck:  Supple, symmetrical,  thyroid: non tender  Lungs:   Clear to auscultation bilaterally. No Wheezing or Rhonchi. No rales. Tachypneic with Kussmaul breathing  Chest wall:  No tenderness  No Accessory muscle use. Heart:   Regular  rhythm,  No  murmur   No edema  Abdomen:   Soft, non-tender. Not distended. Bowel sounds normal  Extremities: No cyanosis. No clubbing,      Skin turgor normal, Capillary refill normal, Radial dial pulse 2+  Skin:     Not pale. Not Jaundiced  No rashes   Neurologic: EOMs intact.   Extremely weak and lethargic and unable to follow commands for appropriate neurological exam but does respond to calling her name and tries to speak but speech is extremely weak    _______________________________________________________________________  Care Plan discussed with:    Comments   Patient x    Family      RN x    Care Manager                    Consultant:      _______________________________________________________________________  Expected  Disposition:   Home with Family    HH/PT/OT/RN    SNF/LTC    TAYA    ________________________________________________________________________  TOTAL TIME: 39 Minutes    Critical Care Provided   35  Minutes non procedure based      Comments    x Reviewed previous records   >50% of visit spent in counseling and coordination of care x Discussion with patient and/or family and questions answered       ________________________________________________________________________  Signed: Nuha Parry MD    Procedures: see electronic medical records for all procedures/Xrays and details which were not copied into this note but were reviewed prior to creation of Plan. LAB DATA REVIEWED:    Recent Results (from the past 24 hour(s))   GLUCOSE, POC    Collection Time: 08/25/20  1:08 PM   Result Value Ref Range    Glucose (POC) >600 (HH) 65 - 100 mg/dL    Performed by [id number]     GLUCOSE, POC    Collection Time: 08/25/20  1:12 PM   Result Value Ref Range    Glucose (POC) >600 (HH) 65 - 100 mg/dL    Performed by [id number]     EKG, 12 LEAD, INITIAL    Collection Time: 08/25/20  2:20 PM   Result Value Ref Range    Ventricular Rate 121 BPM    Atrial Rate 121 BPM    P-R Interval 158 ms    QRS Duration 92 ms    Q-T Interval 330 ms    QTC Calculation (Bezet) 468 ms    Calculated P Axis 54 degrees    Calculated R Axis -54 degrees    Calculated T Axis 40 degrees    Diagnosis       Sinus tachycardia  Left axis deviation  When compared with ECG of 06-JUL-2020 08:38,  premature ventricular complexes are no longer present  T wave amplitude has increased in Lateral leads     CBC WITH AUTOMATED DIFF    Collection Time: 08/25/20  2:35 PM   Result Value Ref Range    WBC 15.2 (H) 3.6 - 11.0 K/uL    RBC 4.11 3.80 - 5.20 M/uL    HGB 12.8 11.5 - 16.0 g/dL    HCT 41.5 35.0 - 47.0 %    .0 (H) 80.0 - 99.0 FL    MCH 31.1 26.0 - 34.0 PG    MCHC 30.8 30.0 - 36.5 g/dL    RDW 13.3 11.5 - 14.5 %    PLATELET 766 343 - 050 K/uL    MPV 10.8 8.9 - 12.9 FL    NRBC 0.0 0  WBC    ABSOLUTE NRBC 0.00 0.00 - 0.01 K/uL    NEUTROPHILS 80 (H) 32 - 75 %    LYMPHOCYTES 9 (L) 12 - 49 %    MONOCYTES 9 5 - 13 %    EOSINOPHILS 0 0 - 7 %    BASOPHILS 0 0 - 1 %    IMMATURE GRANULOCYTES 2 (H) 0.0 - 0.5 %    ABS. NEUTROPHILS 12.1 (H) 1.8 - 8.0 K/UL    ABS. LYMPHOCYTES 1.4 0.8 - 3.5 K/UL    ABS. MONOCYTES 1.4 (H) 0.0 - 1.0 K/UL    ABS. EOSINOPHILS 0.0 0.0 - 0.4 K/UL    ABS. BASOPHILS 0.0 0.0 - 0.1 K/UL    ABS. IMM.  GRANS. 0.3 (H) 0.00 - 0.04 K/UL    DF SMEAR SCANNED      RBC COMMENTS NORMOCYTIC, NORMOCHROMIC     METABOLIC PANEL, COMPREHENSIVE    Collection Time: 08/25/20 2:35 PM   Result Value Ref Range    Sodium 130 (L) 136 - 145 mmol/L    Potassium 6.3 (H) 3.5 - 5.1 mmol/L    Chloride 96 (L) 97 - 108 mmol/L    CO2 5 (LL) 21 - 32 mmol/L    Anion gap 29 (H) 5 - 15 mmol/L    Glucose 732 (HH) 65 - 100 mg/dL    BUN 34 (H) 6 - 20 MG/DL    Creatinine 1.67 (H) 0.55 - 1.02 MG/DL    BUN/Creatinine ratio 20 12 - 20      GFR est AA 36 (L) >60 ml/min/1.73m2    GFR est non-AA 30 (L) >60 ml/min/1.73m2    Calcium 9.2 8.5 - 10.1 MG/DL    Bilirubin, total 0.6 0.2 - 1.0 MG/DL    ALT (SGPT) 67 12 - 78 U/L    AST (SGOT) 47 (H) 15 - 37 U/L    Alk. phosphatase 177 (H) 45 - 117 U/L    Protein, total 7.7 6.4 - 8.2 g/dL    Albumin 4.1 3.5 - 5.0 g/dL    Globulin 3.6 2.0 - 4.0 g/dL    A-G Ratio 1.1 1.1 - 2.2     SAMPLES BEING HELD    Collection Time: 08/25/20  2:35 PM   Result Value Ref Range    SAMPLES BEING HELD RED,EMERSON     COMMENT        Add-on orders for these samples will be processed based on acceptable specimen integrity and analyte stability, which may vary by analyte.    TROPONIN I    Collection Time: 08/25/20  2:35 PM   Result Value Ref Range    Troponin-I, Qt. <0.05 <0.05 ng/mL   MAGNESIUM    Collection Time: 08/25/20  2:35 PM   Result Value Ref Range    Magnesium 2.6 (H) 1.6 - 2.4 mg/dL   URINALYSIS W/ REFLEX CULTURE    Collection Time: 08/25/20  2:35 PM    Specimen: Urine   Result Value Ref Range    Color YELLOW/STRAW      Appearance CLEAR CLEAR      Specific gravity 1.022 1.003 - 1.030      pH (UA) 5.0 5.0 - 8.0      Protein 30 (A) NEG mg/dL    Glucose >1,000 (A) NEG mg/dL    Ketone 80 (A) NEG mg/dL    Bilirubin Negative NEG      Blood Negative NEG      Urobilinogen 0.2 0.2 - 1.0 EU/dL    Nitrites Negative NEG      Leukocyte Esterase Negative NEG      WBC 0-4 0 - 4 /hpf    RBC 0-5 0 - 5 /hpf    Epithelial cells FEW FEW /lpf    Bacteria Negative NEG /hpf    UA:UC IF INDICATED CULTURE NOT INDICATED BY UA RESULT CNI      Hyaline cast 0-2 0 - 5 /lpf   LACTIC ACID    Collection Time: 08/25/20  2:35 PM   Result Value Ref Range    Lactic acid 5.2 (HH) 0.4 - 2.0 MMOL/L   AMMONIA    Collection Time: 08/25/20  2:35 PM   Result Value Ref Range    Ammonia 54 (H) <32 UMOL/L   PHOSPHORUS    Collection Time: 08/25/20  2:35 PM   Result Value Ref Range    Phosphorus 8.9 (H) 2.6 - 4.7 MG/DL   METABOLIC PANEL, BASIC    Collection Time: 08/25/20  4:24 PM   Result Value Ref Range    Sodium 134 (L) 136 - 145 mmol/L    Potassium 6.0 (H) 3.5 - 5.1 mmol/L    Chloride 101 97 - 108 mmol/L    CO2 <5 (LL) 21 - 32 mmol/L    Anion gap Cannot be calculated 5 - 15 mmol/L    Glucose 794 (HH) 65 - 100 mg/dL    BUN 35 (H) 6 - 20 MG/DL    Creatinine 1.67 (H) 0.55 - 1.02 MG/DL    BUN/Creatinine ratio 21 (H) 12 - 20      GFR est AA 36 (L) >60 ml/min/1.73m2    GFR est non-AA 30 (L) >60 ml/min/1.73m2    Calcium 8.3 (L) 8.5 - 10.1 MG/DL   MAGNESIUM    Collection Time: 08/25/20  4:24 PM   Result Value Ref Range    Magnesium 2.7 (H) 1.6 - 2.4 mg/dL   SAMPLES BEING HELD    Collection Time: 08/25/20  4:24 PM   Result Value Ref Range    SAMPLES BEING HELD  PST     COMMENT        Add-on orders for these samples will be processed based on acceptable specimen integrity and analyte stability, which may vary by analyte. GLUCOSE, POC    Collection Time: 08/25/20  4:28 PM   Result Value Ref Range    Glucose (POC) >600 (HH) 65 - 100 mg/dL    Performed by Gloria CANTRELL (traveler)    GLUCOSE, POC    Collection Time: 08/25/20  4:30 PM   Result Value Ref Range    Glucose (POC) >600 (HH) 65 - 100 mg/dL    Performed by Gloria CANTRELL (traveler)    POC EG7    Collection Time: 08/25/20  5:12 PM   Result Value Ref Range    Calcium, ionized (POC) 1.16 1.12 - 1.32 mmol/L    FIO2 (POC) 21 %    pH (POC) 6.90 (LL) 7.35 - 7.45      pCO2 (POC) <15.0 (L) 35.0 - 45.0 MMHG    pO2 (POC) 117 (H) 80 - 100 MMHG    Site RIGHT RADIAL      Device: ROOM AIR      Allens test (POC) N/A      Specimen type (POC) VENOUS BLOOD      Total resp.  rate 28

## 2020-08-25 NOTE — ED NOTES
Pt arrives to the ED via ambulance with a c/c of elevated blood sugar PTA. On EMS arrival, BSL read \"HI\", repeat was 576. Pt is noted febrile and tachypnic and confused on arrival to ED. Pt is now in ED room with side rail up, bed to lowest position and call light within reach. EDP resident at bedside to evaluate, will continue to monitor.

## 2020-08-25 NOTE — ED NOTES
Pt remains hypotensive, and with a MAP <65. Now noted more confused and agitated. EDP Resident notified, will continue to monitor and wait for orders.

## 2020-08-25 NOTE — REMOTE MONITORING
Message left with Justine for primary  RN regarding repeat LA.     Sanders Son, RN, [address]  [phone]

## 2020-08-25 NOTE — ED PROVIDER NOTES
EMERGENCY DEPARTMENT HISTORY AND PHYSICAL EXAM          Date: 8/25/2020  Patient Name: Edmund Snyder  Attending of Record: Gamaliel Tripp    History of Presenting Illness     Chief Complaint   Patient presents with    High Blood Sugar     As per EMS BSL read \"HI\", repeat was 576. History Provided By: Patient and EMS    HPI: Edmund Snyder is a 67 y.o. female, pmhx diabetes, heart failure, hypertension, stroke, who presents to the ED c/o hyperglycemia. Per EMS they were called as her blood sugar was high and she was intermittently poorly oriented. Her blood sugar was noted to be greater than 600 per EMS and she was noted to have a fever with tachycardia. The patient is unable to provide history secondary to altered mental status, however she is able to state that nothing is hurting her currently. History limited due to AMS. PCP: Elizabeth Bradshaw MD    There are no other complaints, changes, or physical findings at this time.      Current Facility-Administered Medications   Medication Dose Route Frequency Provider Last Rate Last Dose    glucose chewable tablet 16 g  4 Tab Oral PRN Kaycee Vale MD        dextrose (D50W) injection syrg 12.5-25 g  25-50 mL IntraVENous PRN Kaycee Vale MD        glucagon [facility]) injection 1 mg  1 mg IntraMUSCular PRN Kaycee Vale MD        brimonidine (ALPHAGAN) 0.2 % ophthalmic solution 1 Drop  1 Drop Both Eyes BID Gina Rogers MD        clopidogreL (PLAVIX) tablet 75 mg  75 mg Oral DAILY Gina Rogers MD        levothyroxine (SYNTHROID) tablet 175 mcg  175 mcg Oral Ronan Shipley MD        pravastatin (PRAVACHOL) tablet 40 mg  40 mg Oral QHS Ronan Ashton MD   40 mg at 08/25/20 2125    insulin regular (NOVOLIN R, HUMULIN R) 100 Units in 0.9% sodium chloride 100 mL infusion  1-50 Units/hr IntraVENous TITRATE Sarahi BERGER MD 10.6 mL/hr at 08/26/20 0151 10.6 Units/hr at 08/26/20 0151    insulin lispro (HUMALOG) injection   SubCUTAneous TIDAC Vinnie Parker MD   Stopped at 08/25/20 1731    glucose chewable tablet 16 g  4 Tab Oral PRN Vinnie Parker MD        dextrose (D50W) injection syrg 12.5-25 g  25-50 mL IntraVENous PRN Vinnie Parker MD        glucagon [facility]) injection 1 mg  1 mg IntraMUSCular PRN Vinnie Parker MD        heparin (porcine) injection 5,000 Units  5,000 Units SubCUTAneous Q8H Lakia BERGER MD   5,000 Units at 08/26/20 0051    ondansetron [facility]) injection 4 mg  4 mg IntraVENous Q4H PRN Vinnie Parker MD        acetaminophen (TYLENOL) tablet 650 mg  650 mg Oral Q6H PRN Vinnie Parker MD        piperacillin-tazobactam (ZOSYN) 3.375 g in 0.9% sodium chloride (MBP/ADV) 100 mL  3.375 g IntraVENous Q8H Martinez Duggan MD 25 mL/hr at 08/25/20 2124 3.375 g at 08/25/20 2124    0.9% sodium chloride with KCl 40 mEq/L infusion   IntraVENous CONTINUOUS Vinnie Parker  mL/hr at 08/25/20 2042         Past History     Past Medical History:  Past Medical History:   Diagnosis Date    Heart failure ([address]     unknown to family    Hypertension     Stroke [facility])        Past Surgical History:  Past Surgical History:   Procedure Laterality Date    HX GYN      HX HEENT      thyroidectomy    HX HYSTERECTOMY      REMOVAL GALLBLADDER      THYROIDECTOMY         Family History:  Family History   Problem Relation Age of Onset    Diabetes Father     No Known Problems Mother        Social History:  Social History     Tobacco Use    Smoking status: Never Smoker    Smokeless tobacco: Never Used   Substance Use Topics    Alcohol use: No     Comment: been years    Drug use: No       Allergies:  No Known Allergies      Review of Systems   Review of Systems   Unable to perform ROS: Mental status change    *    Physical Exam   Physical Exam  Vitals signs and nursing note reviewed. Constitutional:       General: She is in acute distress. Appearance: She is ill-appearing, toxic-appearing and diaphoretic.       Comments: Kussmaul respirations noted   HENT:      Head: Normocephalic and atraumatic. Comments: Dry mucous membranes  Eyes:      Conjunctiva/sclera: Conjunctivae normal.      Comments: Right pupil 4 mm reactive, left pupil 4 mm poorly reactive   Neck:      Musculoskeletal: Neck supple. Cardiovascular:      Rate and Rhythm: Regular rhythm. Tachycardia present. Pulmonary:      Breath sounds: Normal breath sounds. No wheezing or rales. Comments: Kussmaul respirations noted  Abdominal:      Palpations: Abdomen is soft. Tenderness: There is no abdominal tenderness. Musculoskeletal:         General: No swelling or deformity. Skin:     General: Skin is warm. Findings: No rash. Neurological:      General: No focal deficit present. Cranial Nerves: No cranial nerve deficit. Motor: No weakness.       Comments: Patient is oriented to person and location, but not to date or situation         Diagnostic Study Results     Labs -     Recent Results (from the past 12 hour(s))   EKG, 12 LEAD, INITIAL    Collection Time: 08/25/20  2:20 PM   Result Value Ref Range    Ventricular Rate 121 BPM    Atrial Rate 121 BPM    P-R Interval 158 ms    QRS Duration 92 ms    Q-T Interval 330 ms    QTC Calculation (Bezet) 468 ms    Calculated P Axis 54 degrees    Calculated R Axis -54 degrees    Calculated T Axis 40 degrees    Diagnosis       Sinus tachycardia  Left axis deviation  When compared with ECG of 06-JUL-2020 08:38,  premature ventricular complexes are no longer present  T wave amplitude has increased in Lateral leads     CBC WITH AUTOMATED DIFF    Collection Time: 08/25/20  2:35 PM   Result Value Ref Range    WBC 15.2 (H) 3.6 - 11.0 K/uL    RBC 4.11 3.80 - 5.20 M/uL    HGB 12.8 11.5 - 16.0 g/dL    HCT 41.5 35.0 - 47.0 %    .0 (H) 80.0 - 99.0 FL    MCH 31.1 26.0 - 34.0 PG    MCHC 30.8 30.0 - 36.5 g/dL    RDW 13.3 11.5 - 14.5 %    PLATELET 093 631 - 522 K/uL    MPV 10.8 8.9 - 12.9 FL    NRBC 0.0 0  WBC ABSOLUTE NRBC 0.00 0.00 - 0.01 K/uL    NEUTROPHILS 80 (H) 32 - 75 %    LYMPHOCYTES 9 (L) 12 - 49 %    MONOCYTES 9 5 - 13 %    EOSINOPHILS 0 0 - 7 %    BASOPHILS 0 0 - 1 %    IMMATURE GRANULOCYTES 2 (H) 0.0 - 0.5 %    ABS. NEUTROPHILS 12.1 (H) 1.8 - 8.0 K/UL    ABS. LYMPHOCYTES 1.4 0.8 - 3.5 K/UL    ABS. MONOCYTES 1.4 (H) 0.0 - 1.0 K/UL    ABS. EOSINOPHILS 0.0 0.0 - 0.4 K/UL    ABS. BASOPHILS 0.0 0.0 - 0.1 K/UL    ABS. IMM. GRANS. 0.3 (H) 0.00 - 0.04 K/UL    DF SMEAR SCANNED      RBC COMMENTS NORMOCYTIC, NORMOCHROMIC     METABOLIC PANEL, COMPREHENSIVE    Collection Time: 08/25/20  2:35 PM   Result Value Ref Range    Sodium 130 (L) 136 - 145 mmol/L    Potassium 6.3 (H) 3.5 - 5.1 mmol/L    Chloride 96 (L) 97 - 108 mmol/L    CO2 5 (LL) 21 - 32 mmol/L    Anion gap 29 (H) 5 - 15 mmol/L    Glucose 732 (HH) 65 - 100 mg/dL    BUN 34 (H) 6 - 20 MG/DL    Creatinine 1.67 (H) 0.55 - 1.02 MG/DL    BUN/Creatinine ratio 20 12 - 20      GFR est AA 36 (L) >60 ml/min/1.73m2    GFR est non-AA 30 (L) >60 ml/min/1.73m2    Calcium 9.2 8.5 - 10.1 MG/DL    Bilirubin, total 0.6 0.2 - 1.0 MG/DL    ALT (SGPT) 67 12 - 78 U/L    AST (SGOT) 47 (H) 15 - 37 U/L    Alk. phosphatase 177 (H) 45 - 117 U/L    Protein, total 7.7 6.4 - 8.2 g/dL    Albumin 4.1 3.5 - 5.0 g/dL    Globulin 3.6 2.0 - 4.0 g/dL    A-G Ratio 1.1 1.1 - 2.2     SAMPLES BEING HELD    Collection Time: 08/25/20  2:35 PM   Result Value Ref Range    SAMPLES BEING HELD RED,EMERSON     COMMENT        Add-on orders for these samples will be processed based on acceptable specimen integrity and analyte stability, which may vary by analyte.    TROPONIN I    Collection Time: 08/25/20  2:35 PM   Result Value Ref Range    Troponin-I, Qt. <0.05 <0.05 ng/mL   MAGNESIUM    Collection Time: 08/25/20  2:35 PM   Result Value Ref Range    Magnesium 2.6 (H) 1.6 - 2.4 mg/dL   URINALYSIS W/ REFLEX CULTURE    Collection Time: 08/25/20  2:35 PM    Specimen: Urine   Result Value Ref Range    Color YELLOW/STRAW Appearance CLEAR CLEAR      Specific gravity 1.022 1.003 - 1.030      pH (UA) 5.0 5.0 - 8.0      Protein 30 (A) NEG mg/dL    Glucose >1,000 (A) NEG mg/dL    Ketone 80 (A) NEG mg/dL    Bilirubin Negative NEG      Blood Negative NEG      Urobilinogen 0.2 0.2 - 1.0 EU/dL    Nitrites Negative NEG      Leukocyte Esterase Negative NEG      WBC 0-4 0 - 4 /hpf    RBC 0-5 0 - 5 /hpf    Epithelial cells FEW FEW /lpf    Bacteria Negative NEG /hpf    UA:UC IF INDICATED CULTURE NOT INDICATED BY UA RESULT CNI      Hyaline cast 0-2 0 - 5 /lpf   LACTIC ACID    Collection Time: 08/25/20  2:35 PM   Result Value Ref Range    Lactic acid 5.2 (HH) 0.4 - 2.0 MMOL/L   AMMONIA    Collection Time: 08/25/20  2:35 PM   Result Value Ref Range    Ammonia 54 (H) <32 UMOL/L   PHOSPHORUS    Collection Time: 08/25/20  2:35 PM   Result Value Ref Range    Phosphorus 8.9 (H) 2.6 - 4.7 MG/DL   HEMOGLOBIN A1C WITH EAG    Collection Time: 08/25/20  2:35 PM   Result Value Ref Range    Hemoglobin A1c 12.3 (H) 4.0 - 5.6 %    Est. average glucose 343 mg/dL   METABOLIC PANEL, BASIC    Collection Time: 08/25/20  4:24 PM   Result Value Ref Range    Sodium 134 (L) 136 - 145 mmol/L    Potassium 6.0 (H) 3.5 - 5.1 mmol/L    Chloride 101 97 - 108 mmol/L    CO2 <5 (LL) 21 - 32 mmol/L    Anion gap Cannot be calculated 5 - 15 mmol/L    Glucose 794 (HH) 65 - 100 mg/dL    BUN 35 (H) 6 - 20 MG/DL    Creatinine 1.67 (H) 0.55 - 1.02 MG/DL    BUN/Creatinine ratio 21 (H) 12 - 20      GFR est AA 36 (L) >60 ml/min/1.73m2    GFR est non-AA 30 (L) >60 ml/min/1.73m2    Calcium 8.3 (L) 8.5 - 10.1 MG/DL   MAGNESIUM    Collection Time: 08/25/20  4:24 PM   Result Value Ref Range    Magnesium 2.7 (H) 1.6 - 2.4 mg/dL   SAMPLES BEING HELD    Collection Time: 08/25/20  4:24 PM   Result Value Ref Range    SAMPLES BEING HELD  PST     COMMENT        Add-on orders for these samples will be processed based on acceptable specimen integrity and analyte stability, which may vary by analyte. GLUCOSE, POC    Collection Time: 08/25/20  4:28 PM   Result Value Ref Range    Glucose (POC) >600 (HH) 65 - 100 mg/dL    Performed by Caitlyn CANTRELL (traveler)    GLUCOSE, POC    Collection Time: 08/25/20  4:30 PM   Result Value Ref Range    Glucose (POC) >600 (HH) 65 - 100 mg/dL    Performed by Caitlyn CANTRELL (traveler)    POC EG7    Collection Time: 08/25/20  5:12 PM   Result Value Ref Range    Calcium, ionized (POC) 1.16 1.12 - 1.32 mmol/L    FIO2 (POC) 21 %    pH (POC) 6.90 (LL) 7.35 - 7.45      pCO2 (POC) <15.0 (L) 35.0 - 45.0 MMHG    pO2 (POC) 117 (H) 80 - 100 MMHG    Site RIGHT RADIAL      Device: ROOM AIR      Allens test (POC) N/A      Specimen type (POC) VENOUS BLOOD      Total resp.  rate 28     METABOLIC PANEL, BASIC    Collection Time: 08/25/20  6:23 PM   Result Value Ref Range    Sodium 139 136 - 145 mmol/L    Potassium 4.2 3.5 - 5.1 mmol/L    Chloride 106 97 - 108 mmol/L    CO2 <5 (LL) 21 - 32 mmol/L    Anion gap Cannot be calculated 5 - 15 mmol/L    Glucose 652 (HH) 65 - 100 mg/dL    BUN 35 (H) 6 - 20 MG/DL    Creatinine 1.69 (H) 0.55 - 1.02 MG/DL    BUN/Creatinine ratio 21 (H) 12 - 20      GFR est AA 35 (L) >60 ml/min/1.73m2    GFR est non-AA 29 (L) >60 ml/min/1.73m2    Calcium 8.3 (L) 8.5 - 10.1 MG/DL   MAGNESIUM    Collection Time: 08/25/20  6:23 PM   Result Value Ref Range    Magnesium 2.6 (H) 1.6 - 2.4 mg/dL   PHOSPHORUS    Collection Time: 08/25/20  6:23 PM   Result Value Ref Range    Phosphorus 8.2 (H) 2.6 - 4.7 MG/DL   HEMOGLOBIN A1C WITH EAG    Collection Time: 08/25/20  6:23 PM   Result Value Ref Range    Hemoglobin A1c 12.5 (H) 4.0 - 5.6 %    Est. average glucose 312 mg/dL   SARS-COV-2    Collection Time: 08/25/20  6:23 PM   Result Value Ref Range    Specimen source Nasopharyngeal      Specimen source Nasopharyngeal      COVID-19 rapid test Not detected NOTD      Specimen type NP Swab      Health status Symptomatic Testing     LACTIC ACID    Collection Time: 08/25/20  6:23 PM Result Value Ref Range    Lactic acid 4.9 (HH) 0.4 - 2.0 MMOL/L   GLUCOSE, POC    Collection Time: 08/25/20  6:34 PM   Result Value Ref Range    Glucose (POC) >600 (HH) 65 - 100 mg/dL    Performed by Eric CANTRELL (traveler)    GLUCOSE, POC    Collection Time: 08/25/20  7:50 PM   Result Value Ref Range    Glucose (POC) 512 (H) 65 - 100 mg/dL    Performed by Beryl Whitfield    GLUCOSE, POC    Collection Time: 08/25/20  9:15 PM   Result Value Ref Range    Glucose (POC) 480 (H) 65 - 100 mg/dL    Performed by Ariel Raman (PCT)    GLUCOSE, POC    Collection Time: 08/25/20 10:18 PM   Result Value Ref Range    Glucose (POC) 410 (H) 65 - 100 mg/dL    Performed by Cam Zarate    METABOLIC PANEL, BASIC    Collection Time: 08/25/20 10:34 PM   Result Value Ref Range    Sodium 143 136 - 145 mmol/L    Potassium 3.2 (L) 3.5 - 5.1 mmol/L    Chloride 113 (H) 97 - 108 mmol/L    CO2 7 (LL) 21 - 32 mmol/L    Anion gap 23 (H) 5 - 15 mmol/L    Glucose 372 (H) 65 - 100 mg/dL    BUN 34 (H) 6 - 20 MG/DL    Creatinine 1.60 (H) 0.55 - 1.02 MG/DL    BUN/Creatinine ratio 21 (H) 12 - 20      GFR est AA 38 (L) >60 ml/min/1.73m2    GFR est non-AA 31 (L) >60 ml/min/1.73m2    Calcium 8.2 (L) 8.5 - 10.1 MG/DL   MAGNESIUM    Collection Time: 08/25/20 10:34 PM   Result Value Ref Range    Magnesium 2.2 1.6 - 2.4 mg/dL   GLUCOSE, POC    Collection Time: 08/25/20 11:22 PM   Result Value Ref Range    Glucose (POC) 317 (H) 65 - 100 mg/dL    Performed by Cam Zarate    GLUCOSE, POC    Collection Time: 08/26/20 12:35 AM   Result Value Ref Range    Glucose (POC) 279 (H) 65 - 100 mg/dL    Performed by Cam Zarate    GLUCOSE, POC    Collection Time: 08/26/20  1:43 AM   Result Value Ref Range    Glucose (POC) 195 (H) 65 - 100 mg/dL    Performed by Ariel Raman (PCT)        Radiologic Studies -   XR CHEST PORT   Final Result   IMPRESSION: Central venous catheter in appropriate position. No pneumothorax.          CT HEAD WO CONT   Final Result   IMPRESSION: No acute intracranial hemorrhage or infarct. XR CHEST PORT   Final Result   IMPRESSION: No acute cardiopulmonary disease. CT Results  (Last 48 hours)               08/25/20 1604  CT HEAD WO CONT Final result    Impression:  IMPRESSION: No acute intracranial hemorrhage or infarct. Narrative:  INDICATION: Hyperglycemia. AMS. Exam: Noncontrast CT of the brain is performed with 5 mm collimation. CT dose reduction was achieved to the use of a standardized protocol tailored   for this examination and automatic exposure control for dose modulation. Direct comparison is made to prior CT brain dated 6/2020. FINDINGS: There is no acute intracranial hemorrhage, mass, mass effect or   herniation. There is age-appropriate diffuse cortical atrophy with ex vacuo   dilatation of the ventricular system. There is no evidence of acute territorial   infarct. The mastoid air cells are well pneumatized. The visualized paranasal   sinuses are normal.               CXR Results  (Last 48 hours)               08/25/20 1835  XR CHEST PORT Final result    Impression:  IMPRESSION: Central venous catheter in appropriate position. No pneumothorax. Narrative:  INDICATION: Central line placement. Portable AP semiupright view of the chest.       Direct comparison made to prior chest x-ray dated August 25, 2020, 3:31 PM.       Cardiomediastinal silhouette is stable. Right internal jugular centimeters   catheter extends to the distal SVC. Lungs are clear bilaterally. Pleural spaces   are normal and there is no pneumothorax. Osseous structures are diffusely   demineralized. 08/25/20 1545  XR CHEST PORT Final result    Impression:  IMPRESSION: No acute cardiopulmonary disease. Narrative:  INDICATION: hyperglycemia. Portable AP semiupright view of the chest.       Direct comparison made to prior chest x-ray dated 6/2020. Cardiomediastinal silhouette is stable. Lungs are clear bilaterally. Pleural   spaces are normal. Osseous structures are intact. Medical Decision Making   I am the first provider for this patient. I reviewed the vital signs, available nursing notes, past medical history, past surgical history, family history and social history. Vital Signs-Reviewed the patient's vital signs. Patient Vitals for the past 12 hrs:   Temp Pulse Resp BP SpO2   08/25/20 2240 (!) 95.8 °F (35.4 °C) 90 22 118/50 100 %   08/25/20 2121 (!) 96 °F (35.6 °C) 92 25 122/51 100 %   08/25/20 2015  95 25 (!) 98/34 100 %   08/25/20 2000 97.6 °F (36.4 °C) 95 26 (!) 96/32 100 %   08/25/20 1945  97 27 (!) 92/32 100 %   08/25/20 1930  99 26 (!) 96/34 100 %   08/25/20 1915  97 26 (!) 93/32 100 %   08/25/20 1900 (!) 94.9 °F (34.9 °C) (!) 101 25 95/48 100 %   08/25/20 1845  (!) 103 29 (!) 93/30 100 %   08/25/20 1830  (!) 107 27 (!) 98/30 100 %   08/25/20 1815  (!) 104 26 (!) 102/31 100 %   08/25/20 1800  (!) 107 26 (!) 106/34 100 %   08/25/20 1745  (!) 108 (!) 38 (!) 115/32 100 %   08/25/20 1730  (!) 109 30 (!) 110/29 100 %   08/25/20 1715  (!) 109 26 (!) 117/31 100 %   08/25/20 1700  (!) 109 29 (!) 115/29 100 %   08/25/20 1645  (!) 111 29 (!) 113/21 100 %   08/25/20 1630  (!) 114 27 (!) 118/31 100 %   08/25/20 1615  (!) 111 (!) 32 (!) 109/28 100 %   08/25/20 1545  (!) 113 (!) 34 (!) 112/29 100 %   08/25/20 1530  (!) 118 23 (!) 127/33 100 %   08/25/20 1515  (!) 121 23 135/48 100 %   08/25/20 1500  (!) 122 25 (!) 139/30 100 %   08/25/20 1445  (!) 121 (!) 33 (!) 132/32 100 %   08/25/20 1430  (!) 121 26 (!) 137/34 100 %   08/25/20 1416 (!) 101 °F (38.3 °C) (!) 119 24 (!) 138/36 99 %       ED EKG interpretation:  Rhythm: sinus tachycardia; and regular . Rate (approx.): 121; Axis: left axis deviation; P wave: normal; QRS interval: normal ; ST/T wave: normal; Other findings: borderline ekg.  This EKG was interpreted by John Grider M.D. Records Reviewed: Nursing Notes and Old Medical Records    Provider Notes (Medical Decision Making):   DDx: Sepsis, DKA, HHS, dehydration, ICH, CVA, AMI    Patient is currently tachycardic, tachypneic, febrile. Given blood glucose per EMS and on arrival, previous history of DKA, there is concern that this patient is currently in DKA. This is also supported by confusion, Kussmaul respirations on exam. There is potentially a septic component of this as well as patient is currently febrile, tachycardic. While patient has a left pupillary defect, she has no other focal neurologic deficits which is very reassuring, favoring chronic pupillary issue or deficit from previous stroke. Will defer full stroke work-up at this time given likelihood of previous diagnoses, however will get CT head noncontrast for evaluation of potential ICH though lower suspicion. Given patient's presentation will get very broad work-up including CBC, BMP, EKG, troponin, ammonia, VBG, urinalysis, CT head, chest x-ray, blood cultures, lactate. Will give vancomycin and Zosyn to treat sepsis empirically. Will give 1 L normal saline to treat both sepsis and DKA well potassium results. We will presumptively plan to start an insulin drip once potassium has resulted in DKA is confirmed. ED Course and Progress Notes:   Initial assessment performed. The patients presenting problems have been discussed, and they are in agreement with the care plan formulated and outlined with them. I have encouraged them to ask questions as they arise throughout their visit. ED Course as of Aug 26 0157   Tue Aug 25, 2020   1541 Patient potassium 6.3. Starting insulin drip. [AS]   2348 Ordering 2 g calcium gluconate, 500 cc bolus normal saline.    [AS]   1801 Patient maps dropping into the 40s. Central line placed, nor epi started. Bicarb drip started.     [AS]      ED Course User Index  [AS] Gerald Griffin MD Procedures    Procedure Note - Central Venous Access:   Performed by Brittnee Bai MD (resident), supervised by Karl Santacruz MD    Obtained emergent Consent. Immediately prior to the procedure, the patient was reevaluated and found suitable for the planned procedure and any planned medications. Immediately prior to the procedure a time out was called to verify the correct patient, procedure, equipment, staff, and marking as appropriate. The site was prepped with ChloraPrep and Sterile draping. Using Seldinger technique a Triple Lumen CVC was placed in the Right, Internal Jugular Vein via direct cannulation with 1 number of attempts for Blood Drawing and IV Access. Ultrasound Guidance was utilized. There was good blood return. The following complications were encountered: None. A follow-up chest x-ray was ordered post procedure. The procedure was tolerated well. I was present for and supervised the entire procedure. See resident note for details.      Orders Placed This Encounter    CULTURE, BLOOD, PAIRED    XR CHEST PORT    CT HEAD WO CONT    XR CHEST PORT    CBC WITH AUTOMATED DIFF    METABOLIC PANEL, COMPREHENSIVE    VENOUS BLOOD GAS    Hold Sample    TROPONIN I    MAGNESIUM    VENOUS BLOOD GAS    URINALYSIS W/ REFLEX CULTURE    LACTIC ACID, PLASMA    AMMONIA    PHOSPHORUS    HEMOGLOBIN A1C    SAMPLES BEING HELD    POC EG7    PHOSPHORUS    HEMOGLOBIN A1C    ARTERIAL BLOOD GAS    SARS-COV-2    LACTIC ACID, PLASMA    METABOLIC PANEL, BASIC    DIET DIABETIC CONSISTENT CARB Mechanical Soft    STRAIGHT CATHETER (NURSING) ONE TIME STAT    NOTIFY PROVIDER: SPECIFY Notify provider on pt's arrival to floor ONE TIME STAT    INTAKE AND OUTPUT    VITAL SIGNS PER UNIT ROUTINE    Cardiac Monitor    NURSING-MISCELLANEOUS: awaiting BMP from 12 noon CONTINUOUS    Cardiac Monitor    ACTIVITY AS SPECIFIED    POC GLUCOSE    ACTIVITY AS SPECIFIED    DO NOT RESUSCITATE    IP CONSULT TO PALLIATIVE CARE - PROVIDER    IP CONSULT TO PHYSICAL THERAPY    GLUCOSE, POC    GLUCOSE, POC    GLUCOSE, POC    GLUCOSE, POC    GLUCOSE, POC    GLUCOSE, POC    GLUCOSE, POC    GLUCOSE, POC    GLUCOSE, POC    GLUCOSE, POC    GLUCOSE, POC    GLUCOSE, POC    GLUCOSE, POC    GLUCOSE, POC    GLUCOSE, POC    GLUCOSE, POC    GLUCOSE, POC    GLUCOSE, POC    GLUCOSE, POC    GLUCOSE, POC    POC CHEM8    GLUCOSE, POC    GLUCOSE, POC    GLUCOSE, POC    GLUCOSE, POC    GLUCOSE, POC    GLUCOSE, POC    GLUCOSE, POC    GLUCOSE, POC    GLUCOSE, POC    GLUCOSE, POC    GLUCOSE, POC    GLUCOSE, POC    GLUCOSE, POC    GLUCOSE, POC    GLUCOSE, POC    GLUCOSE, POC    GLUCOSE, POC    GLUCOSE, POC    GLUCOSE, POC    GLUCOSE, POC    GLUCOSE, POC    GLUCOSE, POC    GLUCOSE, POC    GLUCOSE, POC    GLUCOSE, POC    GLUCOSE, POC    GLUCOSE, POC    GLUCOSE, POC    GLUCOSE, POC    GLUCOSE, POC    EKG 12 LEAD INITIAL    EKG, 12 LEAD, INITIAL    EKG, 12 LEAD, INITIAL    SAMPLE TO BLOOD BANK    INSERT PERIPHERAL IV ONE TIME STAT    TRANSFER PATIENT    TRANSFER PATIENT    SCANNED SEND OUT LAB RESULTS    sodium chloride 0.9 % bolus infusion 1,000 mL    DISCONTD: acetaminophen (TYLENOL) tablet 1,000 mg    DISCONTD: acetaminophen (TYLENOL) suppository 650 mg    DISCONTD: vancomycin (VANCOCIN) 1,000 mg in 0.9% sodium chloride (MBP/ADV) 250 mL    piperacillin-tazobactam (ZOSYN) 3.375 g in 0.9% sodium chloride (MBP/ADV) 100 mL    vancomycin (VANCOCIN) 1250 mg in  ml infusion    acetaminophen (TYLENOL) suppository 975 mg    DISCONTD: insulin regular (NOVOLIN R, HUMULIN R) 100 Units in 0.9% sodium chloride 100 mL infusion    glucose chewable tablet 16 g    dextrose (D50W) injection syrg 12.5-25 g    glucagon (GLUCAGEN) injection 1 mg    sodium chloride 0.9 % bolus infusion 500 mL    calcium gluconate injection 2 g    sodium bicarbonate 8.4 % (1 mEq/mL) injection 50 mEq  DISCONTD: sodium bicarbonate 150 mEq/1000 mL D5W (premix)    DISCONTD: NOREPINephrine (LEVOPHED) 8 mg in 5% dextrose 250mL (32 mcg/mL) infusion    brimonidine (ALPHAGAN) 0.2 % ophthalmic solution 1 Drop    clopidogreL (PLAVIX) tablet 75 mg    levothyroxine (SYNTHROID) tablet 175 mcg    pravastatin (PRAVACHOL) tablet 40 mg    DISCONTD: insulin regular (NOVOLIN R, HUMULIN R) 100 Units in 0.9% sodium chloride 100 mL infusion    DISCONTD: insulin lispro (HUMALOG) injection    DISCONTD: glucose chewable tablet 16 g    DISCONTD: dextrose (D50W) injection syrg 12.5-25 g    DISCONTD: glucagon (GLUCAGEN) injection 1 mg    heparin (porcine) injection 5,000 Units    ondansetron (ZOFRAN) injection 4 mg    acetaminophen (TYLENOL) tablet 650 mg    DISCONTD: 0.9% sodium chloride infusion    sodium bicarbonate 8.4 % (1 mEq/mL) injection 50 mEq    DISCONTD: insulin regular (NOVOLIN R, HUMULIN R) 100 Units in 0.9% sodium chloride 100 mL infusion    FOLLOWED BY Linked Order Group     sodium chloride 0.9 % bolus infusion 1,000 mL     sodium chloride 0.9 % bolus infusion 848 mL    DISCONTD: piperacillin-tazobactam (ZOSYN) 3.375 g in 0.9% sodium chloride (MBP/ADV) 100 mL    DISCONTD: piperacillin-tazobactam (ZOSYN) 3.375 g in 0.9% sodium chloride (MBP/ADV) 100 mL    DISCONTD: piperacillin-tazobactam (ZOSYN) 3.375 g in 0.9% sodium chloride (MBP/ADV) 100 mL    DISCONTD: 0.9% sodium chloride with KCl 40 mEq/L infusion    sodium chloride 0.9 % bolus infusion 1,000 mL    DISCONTD: insulin glargine (LANTUS) injection 10 Units    dextrose 5% - 0.45% NaCl with KCl 20 mEq/L infusion    DISCONTD: insulin lispro (HUMALOG) injection    DISCONTD: insulin regular (NOVOLIN R, HUMULIN R) 100 Units in 0.9% sodium chloride 100 mL infusion    DISCONTD: dextrose 5% - 0.45% NaCl with KCl 20 mEq/L infusion    insulin lispro (HUMALOG) injection 4 Units    DISCONTD: 0.45% sodium chloride 1,000 mL with potassium chloride 40 mEq infusion    0.45% sodium chloride 1,000 mL with potassium chloride 40 mEq infusion    insulin glargine (LANTUS) injection 20 Units    loperamide (IMODIUM) capsule 4 mg    lactobac ac& pc-s.therm-b.anim (GALILEA Q/RISAQUAD)    INITIAL PHYSICIAN ORDER: INPATIENT Stepdown; 3.  Patient receiving treatment that can only be provided in an inpatient setting (further clarification in H&P documentation)       Medications   glucose chewable tablet 16 g (has no administration in time range)   dextrose (D50W) injection syrg 12.5-25 g (9.5 g IntraVENous Given 8/26/20 0905)   glucagon (GLUCAGEN) injection 1 mg (has no administration in time range)   brimonidine (ALPHAGAN) 0.2 % ophthalmic solution 1 Drop (1 Drop Both Eyes Given 8/28/20 0818)   clopidogreL (PLAVIX) tablet 75 mg (75 mg Oral Given 8/28/20 0814)   levothyroxine (SYNTHROID) tablet 175 mcg (175 mcg Oral Given 8/28/20 0550)   pravastatin (PRAVACHOL) tablet 40 mg (40 mg Oral Given 8/27/20 2200)   heparin (porcine) injection 5,000 Units (5,000 Units SubCUTAneous Given 8/28/20 0831)   ondansetron (ZOFRAN) injection 4 mg (has no administration in time range)   acetaminophen (TYLENOL) tablet 650 mg (has no administration in time range)   dextrose 5% - 0.45% NaCl with KCl 20 mEq/L infusion (0 mL/hr IntraVENous Stopped 8/26/20 1200)   insulin lispro (HUMALOG) injection 4 Units (4 Units SubCUTAneous Given 8/28/20 0815)   0.45% sodium chloride 1,000 mL with potassium chloride 40 mEq infusion ( IntraVENous Rate Change 8/28/20 0813)   insulin glargine (LANTUS) injection 20 Units (20 Units SubCUTAneous Given 8/28/20 0815)   loperamide (IMODIUM) capsule 4 mg (has no administration in time range)   lactobac ac& pc-s.therm-b.anim (GALILEA Q/RISAQUAD) (has no administration in time range)   sodium chloride 0.9 % bolus infusion 1,000 mL (1,000 mL IntraVENous New Bag 8/25/20 1457)   piperacillin-tazobactam (ZOSYN) 3.375 g in 0.9% sodium chloride (MBP/ADV) 100 mL (3.375 g IntraVENous New Bag 8/25/20 1519)   vancomycin (VANCOCIN) 1250 mg in  ml infusion (1,250 mg IntraVENous New Bag 8/25/20 1618)   acetaminophen (TYLENOL) suppository 975 mg (975 mg Rectal Given 8/25/20 1518)   sodium chloride 0.9 % bolus infusion 500 mL (500 mL IntraVENous New Bag 8/25/20 1612)   calcium gluconate injection 2 g (2 g IntraVENous Given 8/25/20 1621)   sodium bicarbonate 8.4 % (1 mEq/mL) injection 50 mEq (50 mEq IntraVENous Given 8/25/20 1716)   sodium bicarbonate 8.4 % (1 mEq/mL) injection 50 mEq (50 mEq IntraVENous Given 8/25/20 1818)   sodium chloride 0.9 % bolus infusion 1,000 mL (1,000 mL IntraVENous New Bag 8/25/20 1843)     Followed by   sodium chloride 0.9 % bolus infusion 848 mL (848 mL IntraVENous New Bag 8/25/20 1816)   sodium chloride 0.9 % bolus infusion 1,000 mL (1,000 mL IntraVENous New Bag 8/26/20 0105)       Consult Note:  Rubia Bhardwaj MD spoke with Dr. Snow Castrejon,   Specialty: Hospitalist  Discussed pt's hx, disposition, and available diagnostic and imaging results. Reviewed care plans. Agree with management and plan thus far. Consultant will evaluate pt for admission. Critical Care:  CRITICAL CARE NOTE :    CRITICAL CARE NOTE :  IMPENDING DETERIORATION -Airway, Respiratory, Cardiovascular, CNS, Metabolic, Renal and Hepatic  ASSOCIATED RISK FACTORS - Hypotension, Shock, Hypoxia, Dysrhythmia, Metabolic changes, Dehydration, Vascular Compromise and CNS Decompensation  MANAGEMENT- Bedside Assessment and Supervision of Care  INTERPRETATION -  Xrays, CT Scan, Blood Gases, ECG, Blood Pressure and Cardiac Output Measures   INTERVENTIONS - hemodynamic mngmt, vascular control, Neurologic interventions  and Metobolic interventions  CASE REVIEW - Hospitalist, Medical Sub-Specialist, Nursing and Family  TREATMENT RESPONSE -Improved  PERFORMED BY - Self and Resident    NOTES   :  I personally spent 75 minutes of critical care time.  This is time spent at this critically ill patient's bedside actively involved in patient care as well as the coordination of care and discussions with the patient's family. This includes time involved in lab review, consultations with specialist, family decision-making, bedside attention and documentation. During this entire length of time I was immediately available to the patient. This does not include any procedural time which has been billed separately. Critical Care: The reason for providing this level of medical care for this critically-ill patient was due to a critical illness that impaired one or more vital organ systems, such that there was a high probability of imminent or life-threatening deterioration in the patient's condition. This care involved the highest level of preparedness to intervene urgently. This care involved high complexity decision making to assess, manipulate, and support vital system functions, to treat this degree of vital organ system failure, and to prevent further life threatening deterioration of the patients condition requiring frequent assessments and interventions. Sha Shaikh MD    Diagnosis     Clinical Impression:   1. Septic shock ([address]    2. Diabetic ketoacidosis with coma associated with other specified diabetes mellitus ([address]    3. Altered mental status, unspecified altered mental status type    4. ONEYDA (acute kidney injury) ([address]    5. Acute metabolic encephalopathy    6. Acute hyperkalemia    7. Lactic acid acidosis    8. Severe dehydration    9. Hypotension, unspecified hypotension type        Disposition:  ICU      I personally performed the services described in this documentation on this date 8/25/2020 for Kenna Members. I have reviewed and verified that all the information is accurate and complete. Sha Shaikh MD    This note will not be viewable in [address]

## 2020-08-26 LAB
ANION GAP SERPL CALC-SCNC: 14 MMOL/L (ref 5–15)
ANION GAP SERPL CALC-SCNC: 7 MMOL/L (ref 5–15)
ANION GAP SERPL CALC-SCNC: 9 MMOL/L (ref 5–15)
ATRIAL RATE: 120 BPM
ATRIAL RATE: 121 BPM
BUN BLD-MCNC: 32 MG/DL (ref 9–20)
BUN SERPL-MCNC: 26 MG/DL (ref 6–20)
BUN SERPL-MCNC: 31 MG/DL (ref 6–20)
BUN SERPL-MCNC: 31 MG/DL (ref 6–20)
BUN/CREAT SERPL: 21 (ref 12–20)
BUN/CREAT SERPL: 22 (ref 12–20)
BUN/CREAT SERPL: 23 (ref 12–20)
CA-I BLD-MCNC: 1.29 MMOL/L (ref 1.12–1.32)
CALCIUM SERPL-MCNC: 7.5 MG/DL (ref 8.5–10.1)
CALCIUM SERPL-MCNC: 7.9 MG/DL (ref 8.5–10.1)
CALCIUM SERPL-MCNC: 8 MG/DL (ref 8.5–10.1)
CALCULATED P AXIS, ECG09: 54 DEGREES
CALCULATED P AXIS, ECG09: 57 DEGREES
CALCULATED R AXIS, ECG10: -54 DEGREES
CALCULATED R AXIS, ECG10: -54 DEGREES
CALCULATED T AXIS, ECG11: 40 DEGREES
CALCULATED T AXIS, ECG11: 53 DEGREES
CHLORIDE BLD-SCNC: 108 MMOL/L (ref 98–107)
CHLORIDE SERPL-SCNC: 119 MMOL/L (ref 97–108)
CHLORIDE SERPL-SCNC: 122 MMOL/L (ref 97–108)
CHLORIDE SERPL-SCNC: 123 MMOL/L (ref 97–108)
CO2 SERPL-SCNC: 14 MMOL/L (ref 21–32)
CO2 SERPL-SCNC: 18 MMOL/L (ref 21–32)
CO2 SERPL-SCNC: 18 MMOL/L (ref 21–32)
CREAT BLD-MCNC: 1.2 MG/DL (ref 0.6–1.3)
CREAT SERPL-MCNC: 1.23 MG/DL (ref 0.55–1.02)
CREAT SERPL-MCNC: 1.37 MG/DL (ref 0.55–1.02)
CREAT SERPL-MCNC: 1.41 MG/DL (ref 0.55–1.02)
DIAGNOSIS, 93000: NORMAL
DIAGNOSIS, 93000: NORMAL
GLUCOSE BLD STRIP.AUTO-MCNC: 102 MG/DL (ref 65–100)
GLUCOSE BLD STRIP.AUTO-MCNC: 106 MG/DL (ref 65–100)
GLUCOSE BLD STRIP.AUTO-MCNC: 116 MG/DL (ref 65–100)
GLUCOSE BLD STRIP.AUTO-MCNC: 121 MG/DL (ref 65–100)
GLUCOSE BLD STRIP.AUTO-MCNC: 129 MG/DL (ref 65–100)
GLUCOSE BLD STRIP.AUTO-MCNC: 132 MG/DL (ref 65–100)
GLUCOSE BLD STRIP.AUTO-MCNC: 137 MG/DL (ref 65–100)
GLUCOSE BLD STRIP.AUTO-MCNC: 138 MG/DL (ref 65–100)
GLUCOSE BLD STRIP.AUTO-MCNC: 149 MG/DL (ref 65–100)
GLUCOSE BLD STRIP.AUTO-MCNC: 173 MG/DL (ref 65–100)
GLUCOSE BLD STRIP.AUTO-MCNC: 195 MG/DL (ref 65–100)
GLUCOSE BLD STRIP.AUTO-MCNC: 279 MG/DL (ref 65–100)
GLUCOSE BLD STRIP.AUTO-MCNC: 52 MG/DL (ref 65–100)
GLUCOSE BLD STRIP.AUTO-MCNC: 588 MG/DL (ref 65–100)
GLUCOSE BLD STRIP.AUTO-MCNC: 59 MG/DL (ref 65–100)
GLUCOSE BLD STRIP.AUTO-MCNC: 70 MG/DL (ref 65–100)
GLUCOSE BLD STRIP.AUTO-MCNC: 73 MG/DL (ref 65–100)
GLUCOSE BLD STRIP.AUTO-MCNC: 74 MG/DL (ref 65–100)
GLUCOSE BLD STRIP.AUTO-MCNC: 81 MG/DL (ref 65–100)
GLUCOSE BLD STRIP.AUTO-MCNC: 82 MG/DL (ref 65–100)
GLUCOSE BLD STRIP.AUTO-MCNC: 85 MG/DL (ref 65–100)
GLUCOSE BLD STRIP.AUTO-MCNC: 89 MG/DL (ref 65–100)
GLUCOSE BLD STRIP.AUTO-MCNC: 92 MG/DL (ref 65–100)
GLUCOSE BLD STRIP.AUTO-MCNC: 95 MG/DL (ref 65–100)
GLUCOSE BLD STRIP.AUTO-MCNC: 96 MG/DL (ref 65–100)
GLUCOSE BLD STRIP.AUTO-MCNC: 98 MG/DL (ref 65–100)
GLUCOSE BLD-MCNC: >700 MG/DL (ref 65–100)
GLUCOSE SERPL-MCNC: 113 MG/DL (ref 65–100)
GLUCOSE SERPL-MCNC: 153 MG/DL (ref 65–100)
GLUCOSE SERPL-MCNC: 70 MG/DL (ref 65–100)
HCT VFR BLD CALC: 39 % (ref 35–47)
MAGNESIUM SERPL-MCNC: 1.9 MG/DL (ref 1.6–2.4)
MAGNESIUM SERPL-MCNC: 1.9 MG/DL (ref 1.6–2.4)
MAGNESIUM SERPL-MCNC: 2 MG/DL (ref 1.6–2.4)
P-R INTERVAL, ECG05: 156 MS
P-R INTERVAL, ECG05: 158 MS
POTASSIUM BLD-SCNC: 6 MMOL/L (ref 3.5–5.1)
POTASSIUM SERPL-SCNC: 3 MMOL/L (ref 3.5–5.1)
POTASSIUM SERPL-SCNC: 3.5 MMOL/L (ref 3.5–5.1)
POTASSIUM SERPL-SCNC: 3.7 MMOL/L (ref 3.5–5.1)
Q-T INTERVAL, ECG07: 326 MS
Q-T INTERVAL, ECG07: 330 MS
QRS DURATION, ECG06: 92 MS
QRS DURATION, ECG06: 98 MS
QTC CALCULATION (BEZET), ECG08: 460 MS
QTC CALCULATION (BEZET), ECG08: 468 MS
SERVICE CMNT-IMP: ABNORMAL
SERVICE CMNT-IMP: NORMAL
SODIUM BLD-SCNC: 134 MMOL/L (ref 136–145)
SODIUM SERPL-SCNC: 147 MMOL/L (ref 136–145)
SODIUM SERPL-SCNC: 148 MMOL/L (ref 136–145)
SODIUM SERPL-SCNC: 149 MMOL/L (ref 136–145)
VENTRICULAR RATE, ECG03: 120 BPM
VENTRICULAR RATE, ECG03: 121 BPM

## 2020-08-26 PROCEDURE — 74011250636 HC RX REV CODE- 250/636: Performed by: EMERGENCY MEDICINE

## 2020-08-26 PROCEDURE — 65660000000 HC RM CCU STEPDOWN

## 2020-08-26 PROCEDURE — 74011636637 HC RX REV CODE- 636/637: Performed by: INTERNAL MEDICINE

## 2020-08-26 PROCEDURE — 80048 BASIC METABOLIC PNL TOTAL CA: CPT

## 2020-08-26 PROCEDURE — 74011250636 HC RX REV CODE- 250/636: Performed by: INTERNAL MEDICINE

## 2020-08-26 PROCEDURE — 74011000258 HC RX REV CODE- 258: Performed by: INTERNAL MEDICINE

## 2020-08-26 PROCEDURE — 83735 ASSAY OF MAGNESIUM: CPT

## 2020-08-26 PROCEDURE — 36415 COLL VENOUS BLD VENIPUNCTURE: CPT

## 2020-08-26 PROCEDURE — 82962 GLUCOSE BLOOD TEST: CPT

## 2020-08-26 PROCEDURE — 74011000250 HC RX REV CODE- 250: Performed by: INTERNAL MEDICINE

## 2020-08-26 PROCEDURE — 74011000258 HC RX REV CODE- 258: Performed by: EMERGENCY MEDICINE

## 2020-08-26 PROCEDURE — 74011250637 HC RX REV CODE- 250/637: Performed by: INTERNAL MEDICINE

## 2020-08-26 PROCEDURE — 74011000250 HC RX REV CODE- 250: Performed by: STUDENT IN AN ORGANIZED HEALTH CARE EDUCATION/TRAINING PROGRAM

## 2020-08-26 RX ORDER — DEXTROSE, SODIUM CHLORIDE, AND POTASSIUM CHLORIDE 5; .45; .15 G/100ML; G/100ML; G/100ML
120 INJECTION INTRAVENOUS CONTINUOUS
Status: DISCONTINUED | OUTPATIENT
Start: 2020-08-26 | End: 2020-08-27

## 2020-08-26 RX ORDER — INSULIN LISPRO 100 [IU]/ML
INJECTION, SOLUTION INTRAVENOUS; SUBCUTANEOUS
Status: DISCONTINUED | OUTPATIENT
Start: 2020-08-26 | End: 2020-08-27

## 2020-08-26 RX ORDER — INSULIN GLARGINE 100 [IU]/ML
10 INJECTION, SOLUTION SUBCUTANEOUS DAILY
Status: DISCONTINUED | OUTPATIENT
Start: 2020-08-26 | End: 2020-08-27

## 2020-08-26 RX ORDER — DEXTROSE, SODIUM CHLORIDE, AND POTASSIUM CHLORIDE 5; .45; .15 G/100ML; G/100ML; G/100ML
75 INJECTION INTRAVENOUS CONTINUOUS
Status: DISPENSED | OUTPATIENT
Start: 2020-08-26 | End: 2020-08-26

## 2020-08-26 RX ADMIN — SODIUM CHLORIDE 1000 ML: 900 INJECTION, SOLUTION INTRAVENOUS at 01:05

## 2020-08-26 RX ADMIN — POTASSIUM CHLORIDE, DEXTROSE MONOHYDRATE AND SODIUM CHLORIDE 120 ML/HR: 150; 5; 450 INJECTION, SOLUTION INTRAVENOUS at 22:28

## 2020-08-26 RX ADMIN — DEXTROSE MONOHYDRATE 5 G: 25 INJECTION, SOLUTION INTRAVENOUS at 05:20

## 2020-08-26 RX ADMIN — BRIMONIDINE TARTRATE 1 DROP: 2 SOLUTION OPHTHALMIC at 20:05

## 2020-08-26 RX ADMIN — SODIUM CHLORIDE 5 UNITS/HR: 9 INJECTION, SOLUTION INTRAVENOUS at 03:01

## 2020-08-26 RX ADMIN — DEXTROSE MONOHYDRATE 9.5 G: 25 INJECTION, SOLUTION INTRAVENOUS at 09:05

## 2020-08-26 RX ADMIN — DEXTROSE MONOHYDRATE 6 G: 25 INJECTION, SOLUTION INTRAVENOUS at 05:37

## 2020-08-26 RX ADMIN — BRIMONIDINE TARTRATE 1 DROP: 2 SOLUTION OPHTHALMIC at 09:19

## 2020-08-26 RX ADMIN — PIPERACILLIN AND TAZOBACTAM 3.38 G: 3; .375 INJECTION, POWDER, LYOPHILIZED, FOR SOLUTION INTRAVENOUS at 04:41

## 2020-08-26 RX ADMIN — SODIUM CHLORIDE 17.5 UNITS/HR: 9 INJECTION, SOLUTION INTRAVENOUS at 00:57

## 2020-08-26 RX ADMIN — POTASSIUM CHLORIDE AND SODIUM CHLORIDE: 900; 300 INJECTION, SOLUTION INTRAVENOUS at 04:41

## 2020-08-26 RX ADMIN — PIPERACILLIN AND TAZOBACTAM 3.38 G: 3; .375 INJECTION, POWDER, LYOPHILIZED, FOR SOLUTION INTRAVENOUS at 15:37

## 2020-08-26 RX ADMIN — HEPARIN SODIUM 5000 UNITS: 5000 INJECTION INTRAVENOUS; SUBCUTANEOUS at 00:51

## 2020-08-26 RX ADMIN — CLOPIDOGREL BISULFATE 75 MG: 75 TABLET ORAL at 09:16

## 2020-08-26 RX ADMIN — PIPERACILLIN AND TAZOBACTAM 3.38 G: 3; .375 INJECTION, POWDER, LYOPHILIZED, FOR SOLUTION INTRAVENOUS at 21:11

## 2020-08-26 RX ADMIN — HEPARIN SODIUM 5000 UNITS: 5000 INJECTION INTRAVENOUS; SUBCUTANEOUS at 17:22

## 2020-08-26 RX ADMIN — HEPARIN SODIUM 5000 UNITS: 5000 INJECTION INTRAVENOUS; SUBCUTANEOUS at 09:17

## 2020-08-26 RX ADMIN — DEXTROSE MONOHYDRATE 8 G: 25 INJECTION, SOLUTION INTRAVENOUS at 06:57

## 2020-08-26 RX ADMIN — INSULIN GLARGINE 10 UNITS: 100 INJECTION, SOLUTION SUBCUTANEOUS at 10:24

## 2020-08-26 RX ADMIN — POTASSIUM CHLORIDE, DEXTROSE MONOHYDRATE AND SODIUM CHLORIDE 75 ML/HR: 150; 5; 450 INJECTION, SOLUTION INTRAVENOUS at 09:36

## 2020-08-26 NOTE — PROGRESS NOTES
SPEECH THERAPY SCREENING:  SERVICES ARE NOT INDICATED AT THIS TIME    [facility] therapy based on results obtained during the nursing admission assessment. The patients chart was reviewed and the patient is not appropriate for a skilled therapy evaluation at this time. Recommend RN complete STAND when alertness/mentation allows. Please consult speech therapy if any therapy needs arise. Thank you.     Ailyn Rojas, SLP

## 2020-08-26 NOTE — PROGRESS NOTES
Problem: Risk for Spread of Infection  Goal: Prevent transmission of infectious organism to others  Description: Prevent the transmission of infectious organisms to other patients, staff members, and visitors. Outcome: Progressing Towards Goal     Problem: Patient Education:  Go to Education Activity  Goal: Patient/Family Education  Outcome: Progressing Towards Goal     Problem: Diabetes Self-Management  Goal: *Disease process and treatment process  Description: Define diabetes and identify own type of diabetes; list 3 options for treating diabetes. Outcome: Progressing Towards Goal  Goal: *Incorporating nutritional management into lifestyle  Description: Describe effect of type, amount and timing of food on blood glucose; list 3 methods for planning meals. Outcome: Progressing Towards Goal  Goal: *Incorporating physical activity into lifestyle  Description: State effect of exercise on blood glucose levels. Outcome: Progressing Towards Goal  Goal: *Developing strategies to promote health/change behavior  Description: Define the ABC's of diabetes; identify appropriate screenings, schedule and personal plan for screenings. Outcome: Progressing Towards Goal  Goal: *Using medications safely  Description: State effect of diabetes medications on diabetes; name diabetes medication taking, action and side effects. Outcome: Progressing Towards Goal  Goal: *Monitoring blood glucose, interpreting and using results  Description: Identify recommended blood glucose targets  and personal targets. Outcome: Progressing Towards Goal  Goal: *Prevention, detection, treatment of acute complications  Description: List symptoms of hyper- and hypoglycemia; describe how to treat low blood sugar and actions for lowering  high blood glucose level.   Outcome: Progressing Towards Goal  Goal: *Prevention, detection and treatment of chronic complications  Description: Define the natural course of diabetes and describe the relationship of blood glucose levels to long term complications of diabetes.   Outcome: Progressing Towards Goal  Goal: *Developing strategies to address psychosocial issues  Description: Describe feelings about living with diabetes; identify support needed and support network  Outcome: Progressing Towards Goal  Goal: *Insulin pump training  Outcome: Progressing Towards Goal  Goal: *Sick day guidelines  Outcome: Progressing Towards Goal  Goal: *Patient Specific Goal (EDIT GOAL, INSERT TEXT)  Outcome: Progressing Towards Goal

## 2020-08-26 NOTE — PROGRESS NOTES
Reason for Admission:   Sepsis/covid rule out/fever///DKA//non compliance                  RUR Score:      40    PCP: First and Last name:  Alen Uribe   Name of Practice: [facility]   Are you a current patient: Yes/No: yes   Approximate date of last visit: July   Can you participate antwan virtual visit if needed: no    Do you (patient/family) have any concerns for transition/discharge? Not enough caregivers in the home              Plan for utilizing home health:   [facility] will need SPENSER orders    Current Advanced Directive/Advance Care Plan:  Does not have one. Has advanced dementia            Transition of Care Plan:        -pt has advanced dementia and is in a respiratory isolation room  -I called dtr John Peralta at [phone] who is our main . She will call me back tomorrow. She is under quarantine until Friday as she is covid positive. She states her spouse is also covid positive and is in this hospital.  -I called Espinoza Luevano at [facility] at [phone] and left a VM to receive an update  -I called Leonides Schmitz at [facility] at [phone] and left a VM to receive an update  -I called Milagros Rizvi, at [phone], spoke with Jarrod Beauchamp, who has no record of this pt having an APS consult  -I called [facility] at [phone] to receive an update and left a VM. -per chart, pt lives with her 2 sons. Debbie Whalen at [phone] works during the day and she is left with son Martina Beckford who has an intellectual disability. He apparently is able to do only minimal assist for the pt  -Her dtr Maynor Berrios. @[employer], [phone] works  -per chart, pt lives in a tri level home and may have challenges with medication compliance  -she has Medicare//CCCP Medicaid  -she will benefit from [address] consult to address plan of care//goals. She has had 4 admissions and 4 additional ER visits since January 2020  -it would be of benefit to address her code status  -she is open to [facility] for nursing, PT, OT, and SW.   She will need SPENSER orders and would benefit from [facility] as well  -she may benefit from LTC at a nursing facility now that she has Medicaid in place  -a UAI was completed and sent to [address] [place] [address] in June -29 Anne Doll has been involved with assisting with Medicaid application  -she will need a 2nd IM letter and PCP followup rosario't prior to d/c      Care Management Interventions  PCP Verified by CM:  Yes  Transition of Care Consult (CM Consult): Discharge Planning  MyChart Signup: No  Discharge Durable Medical Equipment: No  Physical Therapy Consult: No  Occupational Therapy Consult: No  Speech Therapy Consult: No  Current Support Network: Family Lives Nearby, Lives with Caregiver  Confirm Follow Up Transport: Family  Discharge Location  Discharge Placement: Home

## 2020-08-26 NOTE — PROGRESS NOTES
[address] END OF SHIFT REPORT      Bedside shift change report GIVEN TO Milla Edwards RN. Report included the following information SBAR. SIGNIFICANT CHANGES DURING SHIFT:        CONCERNS TO ADDRESS WITH MD:          [address] NURSING NOTE   Admission Date 8/25/2020   Admission Diagnosis DKA (diabetic ketoacidoses) ([facility] [address] [E11.10]   Consults None      Cardiac Monitoring [x] Yes [] No      Purposeful Hourly Rounding [x] Yes    Tyler Score Total Score: 3   Tyler score 3 or > [x] Bed Alarm [] Avasys [] 1:1 sitter [] Patient refused (Signed refusal form in chart)   Madhu Score Madhu Score: 16   Madhu score 14 or < [] PMT consult [] Wound Care consult    []  Specialty bed  [] Nutrition consult      Influenza Vaccine Received Flu Vaccine for Current Season (usually Sept-March): Not Flu Season           Oxygen needs? [x] Room air Oxygen @  []1L    []2L    []3L   []4L    []5L   []6L via  NC   Chronic home O2 use? [] Yes [] No  Perform O2 challenge test and document in progress note using smart[employer] (.Home[employer])      Last bowel movement Last Bowel Movement Date: 08/26/20      Urinary Catheter             LDAs           Triple Lumen 08/25/20 Right Neck (Active)   Central Line Being Utilized Yes 08/26/20 0249   Criteria for Appropriate Use Hemodynamically unstable, requiring monitoring lines, vasopressors, or volume resuscitation 08/26/20 0249   Site Assessment Clean, dry, & intact 08/26/20 0249   Infiltration Assessment 0 08/26/20 0249   Affected Extremity/Extremities Color distal to insertion site pink (or appropriate for race) 08/26/20 0249   Date of Last Dressing Change 08/25/20 08/26/20 0249   Dressing Status Clean, dry, & intact 08/26/20 0249   Dressing Type Transparent;Disk with Chlorhexadine gluconate (CHG) 08/26/20 0249   Action Taken Open ports on tubing capped 08/26/20 0249   Proximal Hub Color/Line Status White; Infusing 08/26/20 0249   Positive Blood Return (Medial Site) Yes 08/26/20 0249   Medial Hub Color/Line Status Capped;Blue 08/26/20 0249   Positive Blood Return (Lateral Site) Yes 08/26/20 0249   Distal Hub Color/Line Status Brown;Patent 08/26/20 0249   Positive Blood Return (Site #3) Yes 08/26/20 0249   Alcohol Cap Used Yes 08/26/20 0249        Peripheral IV 08/25/20 Right Forearm (Active)   Site Assessment Clean, dry, & intact 08/26/20 0249   Phlebitis Assessment 0 08/26/20 0249   Infiltration Assessment 0 08/26/20 0249   Dressing Status Clean, dry, & intact 08/26/20 0249   Dressing Type Transparent;Tape 08/26/20 0249   Hub Color/Line Status Infusing 08/26/20 0249       Peripheral IV 08/25/20 Left;Upper Arm (Active)   Site Assessment Clean, dry, & intact 08/26/20 0249   Phlebitis Assessment 0 08/26/20 0249   Infiltration Assessment 0 08/26/20 0249   Dressing Status Clean, dry, & intact 08/26/20 0249   Dressing Type Transparent;Tape 08/26/20 0249   Hub Color/Line Status Green; Infusing 08/26/20 0249   Action Taken Open ports on tubing capped 08/25/20 1621   Alcohol Cap Used Yes 08/25/20 1621                         Readmission Risk Assessment Tool Score High Risk            27       Total Score        3 Has Seen PCP in Last 6 Months (Yes=3, No=0)    11 IP Visits Last 12 Months (1-3=4, 4=9, >4=11)    9 Pt. Coverage (Medicare=5 , Medicaid, or Self-Pay=4)    4 Charlson Comorbidity Score (Age + Comorbid Conditions)        Criteria that do not apply:    . Living with Significant Other. Assisted Living. LTAC. SNF.  or   Rehab    Patient Length of Stay (>5 days = 3)       Expected Length of Stay - - -   Actual Length of Stay 1

## 2020-08-26 NOTE — PROGRESS NOTES
1226: BG 92, Gluc stabilizer recommending to hold glucose dose at this time. Dr. Orvil Cogan ordered to continue stabilizer until Co2 is greater than 22. Gluc stabilizer order is expiring in [facility] at this time. Will call pharmacy to retime and continue stabilizer order. 6790: BG 89, hold insulin per stabilizer    0845: BG 52, 9.5mls of D5 given. 3561: BG 73, Stabilizer recommending to hold insulin drip. 1017: BG 81, hold insulin per stablizer    1113: BG 85 hold insulin per stablizer    1212: BG 92 hold insulin per stablizer    1319: BG 95 hold insulin per stablizer    1426: BG 96 hold insulin per stabilizer    1555:  hold insulin per stabilizer     1711: BG 98 hold insulin per stabilizer     1836: Per Dr. Orvil Cogan: Restart D5 0.45 naCl 20 meq 120ml/hr, RESTART BMP 4 hours after start of D5.

## 2020-08-26 NOTE — ED NOTES
TRANSFER - OUT REPORT:    Verbal report given to Josh Linda RN(name) on [address]  being transferred to [facility](unit) for routine progression of care       Report consisted of patients Situation, Background, Assessment and   Recommendations(SBAR). Information from the following report(s) ED Summary was reviewed with the receiving nurse. Lines:   Triple Lumen 08/25/20 Right Neck (Active)   Central Line Being Utilized Yes 08/25/20 1853   Criteria for Appropriate Use Hemodynamically unstable, requiring monitoring lines, vasopressors, or volume resuscitation 08/25/20 1853   Site Assessment Clean, dry, & intact 08/25/20 1853   Infiltration Assessment 0 08/25/20 1853   Affected Extremity/Extremities Color distal to insertion site pink (or appropriate for race) 08/25/20 1853   Date of Last Dressing Change 08/25/20 08/25/20 1853   Dressing Status Clean, dry, & intact 08/25/20 1853   Dressing Type Disk with Chlorhexadine gluconate (CHG); Tape;Transparent 08/25/20 1853   Action Taken Open ports on tubing capped 08/25/20 1853   Proximal Hub Color/Line Status White;Flushed;Cap end changed 08/25/20 1853   Positive Blood Return (Medial Site) Yes 08/25/20 1853   Medial Hub Color/Line Status Blue;Flushed;Cap end changed 08/25/20 1853   Positive Blood Return (Lateral Site) Yes 08/25/20 1853   Distal Hub Color/Line Status Brown;Flushed;Cap end changed 08/25/20 1853   Positive Blood Return (Site #3) Yes 08/25/20 1853   Alcohol Cap Used Yes 08/25/20 1853       Peripheral IV 08/25/20 Right Forearm (Active)       Peripheral IV 08/25/20 Left;Upper Arm (Active)   Site Assessment Clean, dry, & intact 08/25/20 1621   Phlebitis Assessment 0 08/25/20 1621   Infiltration Assessment 0 08/25/20 1621   Dressing Status Clean, dry, & intact 08/25/20 1621   Dressing Type Tape;Transparent 08/25/20 1621   Hub Color/Line Status Flushed;Green 08/25/20 1621   Action Taken Open ports on tubing capped 08/25/20 1621   Alcohol Cap Used Yes 08/25/20 1621 Opportunity for questions and clarification was provided.       Patient transported with:   Monitor  Registered Nurse

## 2020-08-26 NOTE — PROGRESS NOTES
TRANSFER - IN REPORT:    Verbal report received from [facility], RN on Kit Hodges  being received from ED for routine progression of care      Report consisted of patients Situation, Background, Assessment and   Recommendations(SBAR). Information from the following report(s) SBAR and ED Summary was reviewed with the receiving nurse. Opportunity for questions and clarification was provided. Assessment completed upon patients arrival to unit and care assumed.

## 2020-08-27 LAB
ANION GAP SERPL CALC-SCNC: 5 MMOL/L (ref 5–15)
ANION GAP SERPL CALC-SCNC: 6 MMOL/L (ref 5–15)
ANION GAP SERPL CALC-SCNC: 7 MMOL/L (ref 5–15)
ANION GAP SERPL CALC-SCNC: 7 MMOL/L (ref 5–15)
BUN SERPL-MCNC: 17 MG/DL (ref 6–20)
BUN SERPL-MCNC: 20 MG/DL (ref 6–20)
BUN SERPL-MCNC: 22 MG/DL (ref 6–20)
BUN SERPL-MCNC: 22 MG/DL (ref 6–20)
BUN/CREAT SERPL: 17 (ref 12–20)
BUN/CREAT SERPL: 20 (ref 12–20)
BUN/CREAT SERPL: 20 (ref 12–20)
BUN/CREAT SERPL: 21 (ref 12–20)
CALCIUM SERPL-MCNC: 7.4 MG/DL (ref 8.5–10.1)
CALCIUM SERPL-MCNC: 7.5 MG/DL (ref 8.5–10.1)
CALCIUM SERPL-MCNC: 7.6 MG/DL (ref 8.5–10.1)
CALCIUM SERPL-MCNC: 7.8 MG/DL (ref 8.5–10.1)
CHLORIDE SERPL-SCNC: 122 MMOL/L (ref 97–108)
CHLORIDE SERPL-SCNC: 122 MMOL/L (ref 97–108)
CHLORIDE SERPL-SCNC: 123 MMOL/L (ref 97–108)
CHLORIDE SERPL-SCNC: 124 MMOL/L (ref 97–108)
CO2 SERPL-SCNC: 18 MMOL/L (ref 21–32)
CO2 SERPL-SCNC: 19 MMOL/L (ref 21–32)
CO2 SERPL-SCNC: 19 MMOL/L (ref 21–32)
CO2 SERPL-SCNC: 20 MMOL/L (ref 21–32)
CREAT SERPL-MCNC: 1 MG/DL (ref 0.55–1.02)
CREAT SERPL-MCNC: 1.03 MG/DL (ref 0.55–1.02)
CREAT SERPL-MCNC: 1.04 MG/DL (ref 0.55–1.02)
CREAT SERPL-MCNC: 1.12 MG/DL (ref 0.55–1.02)
GLUCOSE BLD STRIP.AUTO-MCNC: 172 MG/DL (ref 65–100)
GLUCOSE BLD STRIP.AUTO-MCNC: 187 MG/DL (ref 65–100)
GLUCOSE BLD STRIP.AUTO-MCNC: 213 MG/DL (ref 65–100)
GLUCOSE BLD STRIP.AUTO-MCNC: 229 MG/DL (ref 65–100)
GLUCOSE BLD STRIP.AUTO-MCNC: 232 MG/DL (ref 65–100)
GLUCOSE BLD STRIP.AUTO-MCNC: 236 MG/DL (ref 65–100)
GLUCOSE BLD STRIP.AUTO-MCNC: 240 MG/DL (ref 65–100)
GLUCOSE BLD STRIP.AUTO-MCNC: 256 MG/DL (ref 65–100)
GLUCOSE BLD STRIP.AUTO-MCNC: 279 MG/DL (ref 65–100)
GLUCOSE BLD STRIP.AUTO-MCNC: 282 MG/DL (ref 65–100)
GLUCOSE BLD STRIP.AUTO-MCNC: 285 MG/DL (ref 65–100)
GLUCOSE BLD STRIP.AUTO-MCNC: 311 MG/DL (ref 65–100)
GLUCOSE BLD STRIP.AUTO-MCNC: 312 MG/DL (ref 65–100)
GLUCOSE BLD STRIP.AUTO-MCNC: 97 MG/DL (ref 65–100)
GLUCOSE SERPL-MCNC: 104 MG/DL (ref 65–100)
GLUCOSE SERPL-MCNC: 166 MG/DL (ref 65–100)
GLUCOSE SERPL-MCNC: 261 MG/DL (ref 65–100)
GLUCOSE SERPL-MCNC: 315 MG/DL (ref 65–100)
MAGNESIUM SERPL-MCNC: 1.8 MG/DL (ref 1.6–2.4)
POTASSIUM SERPL-SCNC: 3.3 MMOL/L (ref 3.5–5.1)
POTASSIUM SERPL-SCNC: 3.6 MMOL/L (ref 3.5–5.1)
POTASSIUM SERPL-SCNC: 3.7 MMOL/L (ref 3.5–5.1)
POTASSIUM SERPL-SCNC: 3.7 MMOL/L (ref 3.5–5.1)
SERVICE CMNT-IMP: ABNORMAL
SERVICE CMNT-IMP: NORMAL
SODIUM SERPL-SCNC: 147 MMOL/L (ref 136–145)
SODIUM SERPL-SCNC: 148 MMOL/L (ref 136–145)
SODIUM SERPL-SCNC: 148 MMOL/L (ref 136–145)
SODIUM SERPL-SCNC: 149 MMOL/L (ref 136–145)

## 2020-08-27 PROCEDURE — 83735 ASSAY OF MAGNESIUM: CPT

## 2020-08-27 PROCEDURE — 74011000258 HC RX REV CODE- 258: Performed by: EMERGENCY MEDICINE

## 2020-08-27 PROCEDURE — 74011636637 HC RX REV CODE- 636/637: Performed by: INTERNAL MEDICINE

## 2020-08-27 PROCEDURE — 82962 GLUCOSE BLOOD TEST: CPT

## 2020-08-27 PROCEDURE — 99223 1ST HOSP IP/OBS HIGH 75: CPT | Performed by: INTERNAL MEDICINE

## 2020-08-27 PROCEDURE — 36415 COLL VENOUS BLD VENIPUNCTURE: CPT

## 2020-08-27 PROCEDURE — 74011250637 HC RX REV CODE- 250/637: Performed by: INTERNAL MEDICINE

## 2020-08-27 PROCEDURE — 74011000258 HC RX REV CODE- 258: Performed by: INTERNAL MEDICINE

## 2020-08-27 PROCEDURE — 74011250636 HC RX REV CODE- 250/636: Performed by: EMERGENCY MEDICINE

## 2020-08-27 PROCEDURE — 80048 BASIC METABOLIC PNL TOTAL CA: CPT

## 2020-08-27 PROCEDURE — 74011250636 HC RX REV CODE- 250/636: Performed by: INTERNAL MEDICINE

## 2020-08-27 PROCEDURE — 65660000001 HC RM ICU INTERMED STEPDOWN

## 2020-08-27 RX ORDER — INSULIN LISPRO 100 [IU]/ML
4 INJECTION, SOLUTION INTRAVENOUS; SUBCUTANEOUS
Status: DISCONTINUED | OUTPATIENT
Start: 2020-08-27 | End: 2020-08-29 | Stop reason: HOSPADM

## 2020-08-27 RX ORDER — INSULIN GLARGINE 100 [IU]/ML
20 INJECTION, SOLUTION SUBCUTANEOUS DAILY
Status: DISCONTINUED | OUTPATIENT
Start: 2020-08-28 | End: 2020-08-29 | Stop reason: HOSPADM

## 2020-08-27 RX ADMIN — POTASSIUM CHLORIDE, DEXTROSE MONOHYDRATE AND SODIUM CHLORIDE 120 ML/HR: 150; 5; 450 INJECTION, SOLUTION INTRAVENOUS at 07:32

## 2020-08-27 RX ADMIN — CLOPIDOGREL BISULFATE 75 MG: 75 TABLET ORAL at 09:10

## 2020-08-27 RX ADMIN — HEPARIN SODIUM 5000 UNITS: 5000 INJECTION INTRAVENOUS; SUBCUTANEOUS at 10:46

## 2020-08-27 RX ADMIN — BRIMONIDINE TARTRATE 1 DROP: 2 SOLUTION OPHTHALMIC at 09:10

## 2020-08-27 RX ADMIN — INSULIN GLARGINE 10 UNITS: 100 INJECTION, SOLUTION SUBCUTANEOUS at 09:10

## 2020-08-27 RX ADMIN — PIPERACILLIN AND TAZOBACTAM 3.38 G: 3; .375 INJECTION, POWDER, LYOPHILIZED, FOR SOLUTION INTRAVENOUS at 22:04

## 2020-08-27 RX ADMIN — INSULIN LISPRO 4 UNITS: 100 INJECTION, SOLUTION INTRAVENOUS; SUBCUTANEOUS at 16:30

## 2020-08-27 RX ADMIN — POTASSIUM CHLORIDE: 2 INJECTION, SOLUTION, CONCENTRATE INTRAVENOUS at 16:25

## 2020-08-27 RX ADMIN — HEPARIN SODIUM 5000 UNITS: 5000 INJECTION INTRAVENOUS; SUBCUTANEOUS at 00:34

## 2020-08-27 RX ADMIN — HEPARIN SODIUM 5000 UNITS: 5000 INJECTION INTRAVENOUS; SUBCUTANEOUS at 16:31

## 2020-08-27 RX ADMIN — BRIMONIDINE TARTRATE 1 DROP: 2 SOLUTION OPHTHALMIC at 21:00

## 2020-08-27 RX ADMIN — PIPERACILLIN AND TAZOBACTAM 3.38 G: 3; .375 INJECTION, POWDER, LYOPHILIZED, FOR SOLUTION INTRAVENOUS at 05:00

## 2020-08-27 RX ADMIN — PRAVASTATIN SODIUM 40 MG: 40 TABLET ORAL at 22:00

## 2020-08-27 RX ADMIN — INSULIN LISPRO 5 UNITS: 100 INJECTION, SOLUTION INTRAVENOUS; SUBCUTANEOUS at 09:10

## 2020-08-27 RX ADMIN — PIPERACILLIN AND TAZOBACTAM 3.38 G: 3; .375 INJECTION, POWDER, LYOPHILIZED, FOR SOLUTION INTRAVENOUS at 15:35

## 2020-08-27 NOTE — PROGRESS NOTES
General Daily Progress Note    Admit Date: 8/25/2020    Subjective:     Patient has no complaint . Elva Marin Current Facility-Administered Medications   Medication Dose Route Frequency    insulin glargine (LANTUS) injection 10 Units  10 Units SubCUTAneous DAILY    insulin lispro (HUMALOG) injection   SubCUTAneous AC&HS    insulin regular (NOVOLIN R, HUMULIN R) 100 Units in 0.9% sodium chloride 100 mL infusion  0-50 Units/hr IntraVENous TITRATE    dextrose 5% - 0.45% NaCl with KCl 20 mEq/L infusion  120 mL/hr IntraVENous CONTINUOUS    glucose chewable tablet 16 g  4 Tab Oral PRN    dextrose (D50W) injection syrg 12.5-25 g  25-50 mL IntraVENous PRN    glucagon (GLUCAGEN) injection 1 mg  1 mg IntraMUSCular PRN    brimonidine (ALPHAGAN) 0.2 % ophthalmic solution 1 Drop  1 Drop Both Eyes BID    clopidogreL (PLAVIX) tablet 75 mg  75 mg Oral DAILY    levothyroxine (SYNTHROID) tablet 175 mcg  175 mcg Oral 6am    pravastatin (PRAVACHOL) tablet 40 mg  40 mg Oral QHS    heparin (porcine) injection 5,000 Units  5,000 Units SubCUTAneous Q8H    ondansetron (ZOFRAN) injection 4 mg  4 mg IntraVENous Q4H PRN    acetaminophen (TYLENOL) tablet 650 mg  650 mg Oral Q6H PRN    piperacillin-tazobactam (ZOSYN) 3.375 g in 0.9% sodium chloride (MBP/ADV) 100 mL  3.375 g IntraVENous Q8H        Review of Systems  A comprehensive review of systems was negative. Objective:     Patient Vitals for the past 24 hrs:   BP Temp Pulse Resp SpO2   08/27/20 0724 137/67 98.2 °F (36.8 °C) 85 18 97 %   08/27/20 0230 140/65 98.6 °F (37 °C) 89 18 98 %   08/26/20 2229 118/54 98.5 °F (36.9 °C) 84 16 100 %   08/26/20 1916 138/60 98.2 °F (36.8 °C) 93 16 100 %   08/26/20 1528 139/55 98.6 °F (37 °C) 92 18 98 %   08/26/20 1113 126/50 98.7 °F (37.1 °C) 93 18 99 %   08/26/20 0847 117/50 98.6 °F (37 °C) 91 18 97 %     No intake/output data recorded.   08/25 1901 - 08/27 0700  In: 2505.3 [I.V.:2505.3]  Out: -     Physical Exam:   Visit Vitals  /67 (BP 1 Location: Right arm, BP Patient Position: At rest)   Pulse 85   Temp 98.2 °F (36.8 °C)   Resp 18   Ht 5' 3\" (1.6 m)   Wt 131 lb 9.8 oz (59.7 kg)   SpO2 97%   BMI 23.31 kg/m²     General appearance: alert, cooperative, no distress, appears stated age  Neck: supple, symmetrical, trachea midline, no adenopathy, thyroid: not enlarged, symmetric, no tenderness/mass/nodules, no carotid bruit and no JVD  Lungs: clear to auscultation bilaterally  Heart: regular rate and rhythm, S1, S2 normal, no murmur, click, rub or gallop  Abdomen: soft, non-tender. Bowel sounds normal. No masses,  no organomegaly  Extremities: extremities normal, atraumatic, no cyanosis or edema    Assessment:     Active Problems:    DKA (diabetic ketoacidoses) ([facility] [address] (6/2/2017)        Plan: 1. Continued DKA. Insulin drip will continue until DKA resolved----ie -CO2>20. 2.Hydration conts. 3.Will again get Paliative consult for disposition. Home situation unchanged.

## 2020-08-27 NOTE — CONSULTS
Palliative Medicine Consult  Christiano: [phone] ([phone])    Patient Name: Jodee Rivera  YOB: 1940    Date of Initial Consult: 8/27/20  Reason for Consult: psychosocial support Conception Blank decisions  Requesting Provider: Silva Arechiga MD  Primary Care Physician: Devon Palafox MD     SUMMARY:   Jodee Rivera is a 67 y.o. Female with a past history of HTN , Stroke , dementia, brittle DM , non compliance , recurrent admission and ER visit for hypoglycemia and DKA,  who was admitted on 8/25/2020 from home with a diagnosis of DKA , ONEYDA and metabolic encepahlopaathy , in addition fever, COVID is ruled out. Current medical issues leading to Palliative Medicine involvement include: support and code status discussion, in a setting of recurrent admission and ER visits for labile DM with episodes of DKA and hypoglycemia , this is her third admission for DKA in last 6 months. Social ; lives in her home with her son Martina Beckford , who is mentally challenged, patient has advance dementia , at base line confused , needs help with ADLS. She has 4 children , 3 sons and one daughter , they try to support her as care taker , however it is difficult because they work . PALLIATIVE DIAGNOSES:   1. Advance dementia . 2. 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 3. Debility   4. Goals of care   5. DNR discussion . PLAN:   1. Prior to visit I reviewed chart , spoke to bed side Rn .  2. Decision making capacity: patient lacks decision making capacity. Patient does not have a advance directives , patient has 4 children /surrogate , her son Pamela Sotelo has mental health issues and cannot take part in decision making . 3. Family meeting /conference call with patient 3 children Bonnie Akins and Codi Whitley with Jonathon Cross ( primary decision maker )  · We discussed role Palliative acre service .   · We talked about patient needs close monitoring of medication and blood sugar level to prevent , repeated episodes of uncontrolled sugars  · Family is struggling to support her care , because all her 3 children works , patient lives with his son Brain who has mental health issues and cannot provide care to patient , family is in agreement , patient will need 24/7 care , we talked about nursing aid /care giver via medicaid vs LTC , for patient and her son , Claudell Nett , referred to care manager Ronit Felton for safe disposition . · DNR discussion : we discussed CPR facts, encourage her to protect her frail body from CPR , family is in agreement for DNAR and DNI. DNR order placed , team  will email DDNR document for her daughter Graham Mary to sign . · We will follow peripherally . 4.   SInitial consult note routed to primary continuity provider and/or primary health care team members  5. Communicated plan of care with: Palliative Morgan  Team     GOALS OF CARE / TREATMENT PREFERENCES:     GOALS OF CARE:       TREATMENT PREFERENCES:   Code Status: Full Code    Advance Care Planning:  [] The [facility] Interdisciplinary Team has updated the ACP Navigator with Health Care Decision Maker and Patient Capacity      Advance Care Planning 8/25/2020   Patient's Healthcare Decision Maker is: -   Primary Decision Maker Name -   Primary Decision Maker Phone Number -   Primary Decision Maker Relationship to Patient -   Secondary Decision Maker Name -   Confirm Advance Directive None   Patient Would Like to Complete Advance Directive -   Does the patient have other document types -       Medical Interventions: Limited additional interventions     Other Instructions: Other:    As far as possible, the palliative care team has discussed with patient / health care proxy about goals of care / treatment preferences for patient.      HISTORY:     History obtained from: chart , bed side RN     CHIEF COMPLAINT: Pleasantly confused     HPI/SUBJECTIVE:    The patient is:   [x] Verbal and participatory, alert , answer yes and no , answers unreliable because of dementia . Clinical Pain Assessment (nonverbal scale for severity on nonverbal patients):   Clinical Pain Assessment  Severity: 0          Duration: for how long has pt been experiencing pain (e.g., 2 days, 1 month, years)  Frequency: how often pain is an issue (e.g., several times per day, once every few days, constant)     FUNCTIONAL ASSESSMENT:     Palliative Performance Scale (PPS):          PSYCHOSOCIAL/SPIRITUAL SCREENING:     Palliative IDT has assessed this patient for cultural preferences / practices and a referral made as appropriate to needs (Cultural Services, Patient Advocacy, Ethics, etc.)    Any spiritual / Sabianism concerns:  [] Yes /  [] No    Caregiver Burnout:  [] Yes /  [x] No /  [] No Caregiver Present      Anticipatory grief assessment:   [x] Normal  / [] Maladaptive       ESAS Anxiety: Anxiety: 0    ESAS Depression:     Unable to evaluate above because of limited ROS due to advance dementia. REVIEW OF SYSTEMS:     Positive and pertinent negative findings in ROS are noted above in HPI. The following systems were [x] reviewed limited because of dementia  / [] unable to be reviewed as noted in HPI  Other findings are noted below. Systems: constitutional, ears/nose/mouth/throat, respiratory, gastrointestinal, genitourinary, musculoskeletal, integumentary, neurologic, psychiatric, endocrine. Positive findings noted below. Modified ESAS Completed by: provider   Fatigue: 6       Pain: 0   Anxiety: 0 Nausea: 0     Dyspnea: 0           Stool Occurrence(s): 2        PHYSICAL EXAM:     From RN flowsheet:  Wt Readings from Last 3 Encounters:   08/25/20 131 lb 9.8 oz (59.7 kg)   07/06/20 150 lb 5.7 oz (68.2 kg)   06/29/20 139 lb 9.6 oz (63.3 kg)     Blood pressure 143/72, pulse 91, temperature 98.1 °F (36.7 °C), resp. rate 16, height 5' 3\" (1.6 m), weight 131 lb 9.8 oz (59.7 kg), SpO2 100 %.     Pain Scale 1: Numeric (0 - 10)  Pain Intensity 1: 0 Last bowel movement, if known:     Constitutional: alert to person and place , pleasantly confused . Eyes: pupils equal, anicteric  ENMT: no nasal discharge, moist mucous membranes  Cardiovascular: regular rhythm, distal pulses intact  Respiratory: breathing not labored, symmetric  Gastrointestinal: soft non-tender, +bowel sounds  Musculoskeletal: no deformity, no tenderness to palpation  Skin: warm, dry  Neurologic: following commands, moving all extremities  Psychiatric: full affect, no hallucinations  Other:       HISTORY:     Active Problems:    DKA (diabetic ketoacidoses) ([facility] [address] (6/2/2017)      Past Medical History:   Diagnosis Date    [facility]     unknown to family    Hypertension     Stroke [facility])       Past Surgical History:   Procedure Laterality Date    HX GYN      HX HEENT      thyroidectomy    HX HYSTERECTOMY      REMOVAL GALLBLADDER      THYROIDECTOMY        Family History   Problem Relation Age of Onset    Diabetes Father     No Known Problems Mother       History reviewed, no pertinent family history.   Social History     Tobacco Use    Smoking status: Never Smoker    Smokeless tobacco: Never Used   Substance Use Topics    Alcohol use: No     Comment: been years     No Known Allergies   Current Facility-Administered Medications   Medication Dose Route Frequency    insulin lispro (HUMALOG) injection 4 Units  4 Units SubCUTAneous TIDAC    0.45% sodium chloride 1,000 mL with potassium chloride 40 mEq infusion   IntraVENous CONTINUOUS    [START ON 8/28/2020] insulin glargine (LANTUS) injection 20 Units  20 Units SubCUTAneous DAILY    glucose chewable tablet 16 g  4 Tab Oral PRN    dextrose (D50W) injection syrg 12.5-25 g  25-50 mL IntraVENous PRN    glucagon (GLUCAGEN) injection 1 mg  1 mg IntraMUSCular PRN    brimonidine (ALPHAGAN) 0.2 % ophthalmic solution 1 Drop  1 Drop Both Eyes BID    clopidogreL (PLAVIX) tablet 75 mg  75 mg Oral DAILY    levothyroxine (SYNTHROID) tablet 175 mcg  175 mcg Oral 6am    pravastatin (PRAVACHOL) tablet 40 mg  40 mg Oral QHS    heparin (porcine) injection 5,000 Units  5,000 Units SubCUTAneous Q8H    ondansetron (ZOFRAN) injection 4 mg  4 mg IntraVENous Q4H PRN    acetaminophen (TYLENOL) tablet 650 mg  650 mg Oral Q6H PRN    piperacillin-tazobactam (ZOSYN) 3.375 g in 0.9% sodium chloride (MBP/ADV) 100 mL  3.375 g IntraVENous Q8H          LAB AND IMAGING FINDINGS:     Lab Results   Component Value Date/Time    WBC 15.2 (H) 08/25/2020 02:35 PM    HGB 12.8 08/25/2020 02:35 PM    PLATELET 863 21/99/9596 02:35 PM     Lab Results   Component Value Date/Time    Sodium 148 (H) 08/27/2020 12:02 PM    Potassium 3.3 (L) 08/27/2020 12:02 PM    Chloride 122 (H) 08/27/2020 12:02 PM    CO2 19 (L) 08/27/2020 12:02 PM    BUN 17 08/27/2020 12:02 PM    Creatinine 1.03 (H) 08/27/2020 12:02 PM    Calcium 7.8 (L) 08/27/2020 12:02 PM    Magnesium 1.8 08/27/2020 12:02 PM    Phosphorus 8.2 (H) 08/25/2020 06:23 PM      Lab Results   Component Value Date/Time    Alk.  phosphatase 177 (H) 08/25/2020 02:35 PM    Protein, total 7.7 08/25/2020 02:35 PM    Albumin 4.1 08/25/2020 02:35 PM    Globulin 3.6 08/25/2020 02:35 PM     Lab Results   Component Value Date/Time    INR 1.0 06/16/2020 10:06 AM    Prothrombin time 10.1 06/16/2020 10:06 AM    aPTT 24.5 12/01/2019 12:03 PM      Lab Results   Component Value Date/Time    Iron 65 12/04/2019 04:24 AM    TIBC 259 12/04/2019 04:24 AM    Iron % saturation 25 12/04/2019 04:24 AM    Ferritin 127 12/04/2019 04:24 AM      Lab Results   Component Value Date/Time    pH 7.00 (LL) 10/19/2018 09:46 PM    PCO2 17 (L) 10/19/2018 09:46 PM    PO2 107 (H) 10/19/2018 09:46 PM     No components found for: [place]   Lab Results   Component Value Date/Time    CK 34 06/23/2020 03:00 AM    CK - MB 9.4 (H) 06/10/2019 06:04 PM                Total time:   Counseling / coordination time, spent as noted above:   > 50% counseling / coordination?: Prolonged service was provided for  []30 min   []75 min in face to face time in the presence of the patient, spent as noted above. Time Start:   Time End:   Note: this can only be billed with 79169 (initial) or 24375 (follow up). If multiple start / stop times, list each separately.

## 2020-08-27 NOTE — ACP (ADVANCE CARE PLANNING)
Primary Decision Maker: Ty Powell - Daughter - [phone]    Primary Decision Maker: Laine Contreras - Son - [phone]    Primary Decision Maker: Abdiel Coats - Son - [phone]  Advance Care Planning 8/27/2020   Patient's [place] is: Legal Next of Kin   Primary Decision Maker Name -   Primary Decision Maker Phone Number -   Primary Decision Maker Relationship to Patient -   Secondary Decision [facility] Name -   Confirm Advance Directive None   Patient Would Like to Complete Advance Directive Unable   Does the patient have other document types -       Family Conference via phone with family of patient including : daughter Shira Yung, sons Brennen Cunningham and Lucero Knapp with Palliative team of Dr Francisco Ellsworth and [facility]. Patient does not have an AMD, has four children. Son Simon Bland has mental health issues and is unable to be a part of this conversation. Patient is confused and not able to participate in this conversation. We discussed: 1. Code status: all three children agree to No Code/ No CPR based on patient's age, medical issues and her overall weak state. DDNR was emailed to simba White at [facility]. [email or web address]  Will await back signed copy. 2. Patient's need for more care in the home: Family agree that both patient and her son Simon Bland (who lives with her who appears to be mentally challenged) need help for caregiving. They all work and are unable to provide this care. They were unaware of assistance that they can get via Medicaid such as home health aides or LTC. They would be interested in receiving this type of care for patient in the home. They are also open to LTC if both patient and her son can be placed in the same facility. Have let them know that CM would assist with this. All the children understand needs of patient, they all work and are unable to be with patient full time.    Family agreed that Ev Camarena should be the contact point for staff as he is most available.  #[phone]

## 2020-08-27 NOTE — PALLIATIVE CARE
Brief summary of Visit, full note will follow. Conference call with, three children /Surrogate legal decision maker, Rebecca Pollard and Colten . family in consensus for DNAR and DNI , team  will email her daughter DDNR to sign . We discuss option of medicaid covered aid services,  vs LTC placement , given care at home is failing because patient has dementia   lives with her,  mentally challenged son , the other children are trying support her between their working hrs , however it is very challenging all her three children work .

## 2020-08-27 NOTE — PROGRESS NOTES
0700: Bedside shift change report given to VIN Ngo (oncoming nurse) by Buzz Stoll RN (offgoing nurse). Report included the following information SBAR and Kardex. 0730: Pt's , CO2 18 and anion gap 7. Rate verified at change of shift with Buzz Stoll RN and rate unchanged at 0.1u/hr. 0830: Pt  dual verified with Gino Walden RN and rate changed to 2. 2u/hr. BMP repeated per Dr. Ping Christianson goal is for CO2 to be above 20 with normal anion gap.     0920: Pt anion gap 5 and CO2 20. This is the 3rd anion gap within range will continue to monitor BG and BMP. 1030: Pt  rate changed and dual verified with Jennie Hunter RN 5.9u/hr. Will continue to monitor.

## 2020-08-27 NOTE — PROGRESS NOTES
0700: Bedside shift change report given to Baljeet Munson RN and VIN Ngo (oncoming nurse) by Pamela Garrett RN (offgoing nurse). Report included the following information SBAR and Kardex. 0730: Pt's  this morning CO2 18 and anion gap of 7. Rate verified at change of shift with Pamela Garrett RN and rate stayed the same at 0.1u./hr.     0830: Pt  and dual verified with Ely Lamas RN and rate changed to 2. 2u/hr. BMP repeated. Awaiting results. Per Dr. Eric Solano goal is for CO2 to be above 20 with normal anion gap.     0920: Patient's anion gap 5 and CO2 20. This is the 3rd anion gap within range. Will continue to monitor BG and bmp.      1300: Discussed with Dr. Eric Solano pt's last BG 97. Has had 4 normal anion gaps and the last CO2 levels were 20 and 19. Per Dr. Eric Solano, discontinue insulin gtt start patient on 4units of humalog pre-meal and change IVF to 0.9% Normal saline with 20meq of K+. Dr. Eric Solano will order for patient to have basal insulin at bedtime.  Awaiting orders as per Dr. Eric Solano, he will put them in.     1318: Per Dr. Chuck Lai switch patient's diet to mechanical soft/

## 2020-08-27 NOTE — PROGRESS NOTES
Problem: Diabetes Self-Management  Goal: *Disease process and treatment process  Description: Define diabetes and identify own type of diabetes; list 3 options for treating diabetes. Outcome: Progressing Towards Goal  Goal: *Incorporating nutritional management into lifestyle  Description: Describe effect of type, amount and timing of food on blood glucose; list 3 methods for planning meals. Outcome: Progressing Towards Goal  Goal: *Incorporating physical activity into lifestyle  Description: State effect of exercise on blood glucose levels. Outcome: Progressing Towards Goal  Goal: *Developing strategies to promote health/change behavior  Description: Define the ABC's of diabetes; identify appropriate screenings, schedule and personal plan for screenings. Outcome: Progressing Towards Goal  Goal: *Using medications safely  Description: State effect of diabetes medications on diabetes; name diabetes medication taking, action and side effects. Outcome: Progressing Towards Goal  Goal: *Monitoring blood glucose, interpreting and using results  Description: Identify recommended blood glucose targets  and personal targets. Outcome: Progressing Towards Goal  Goal: *Prevention, detection, treatment of acute complications  Description: List symptoms of hyper- and hypoglycemia; describe how to treat low blood sugar and actions for lowering  high blood glucose level. Outcome: Progressing Towards Goal  Goal: *Prevention, detection and treatment of chronic complications  Description: Define the natural course of diabetes and describe the relationship of blood glucose levels to long term complications of diabetes.   Outcome: Progressing Towards Goal  Goal: *Developing strategies to address psychosocial issues  Description: Describe feelings about living with diabetes; identify support needed and support network  Outcome: Progressing Towards Goal  Goal: *Insulin pump training  Outcome: Progressing Towards Goal  Goal: *Sick day guidelines  Outcome: Progressing Towards Goal  Goal: *Patient Specific Goal (EDIT GOAL, INSERT TEXT)  Outcome: Progressing Towards Goal     Problem: Pressure Injury - Risk of  Goal: *Prevention of pressure injury  Description: Document Madhu Scale and appropriate interventions in the flowsheet.   Outcome: Progressing Towards Goal  Note: Pressure Injury Interventions:  Sensory Interventions: Assess changes in LOC, Discuss PT/OT consult with provider, Float heels, Keep linens dry and wrinkle-free, Maintain/enhance activity level, Minimize linen layers    Moisture Interventions: Absorbent underpads, Apply protective barrier, creams and emollients, Check for incontinence Q2 hours and as needed, Internal/External urinary devices, Limit adult briefs, Minimize layers    Activity Interventions: Assess need for specialty bed, Increase time out of bed, PT/OT evaluation, Pressure redistribution bed/mattress(bed type)    Mobility Interventions: Assess need for specialty bed, Float heels, Pressure redistribution bed/mattress (bed type), PT/OT evaluation    Nutrition Interventions: Document food/fluid/supplement intake, Offer support with meals,snacks and hydration    Friction and Shear Interventions: Apply protective barrier, creams and emollients, Feet elevated on foot rest, HOB 30 degrees or less, Minimize layers                Problem: Patient Education: Go to Patient Education Activity  Goal: Patient/Family Education  Outcome: Progressing Towards Goal

## 2020-08-27 NOTE — PROGRESS NOTES
Spiritual Care Assessment/Progress Note  [facility]      NAME: Selin Martins      MRN: [id number]  AGE: 67 y.o.  SEX: female  Scientologist Affiliation: Faith   Language: English     8/27/2020     Total Time (in minutes): 18     Spiritual Assessment begun in [facility] through conversation with:         []Patient        [] Family    [] Friend(s)        Reason for Consult: Palliative Care, Initial/Spiritual Assessment     Spiritual beliefs: (Please include comment if needed)     [x] Identifies with a erlinda tradition:         [] Supported by a erlinda community:            [] Claims no spiritual orientation:           [] Seeking spiritual identity:                [] Adheres to an individual form of spirituality:           [] Not able to assess:                           Identified resources for coping:      [x] Prayer                               [] Music                  [] Guided Imagery     [x] Family/friends                 [] Pet visits     [] Devotional reading                         [] Unknown     [] Other:                                            Interventions offered during this visit: (See comments for more details)    Patient Interventions: Affirmation of emotions/emotional suffering, Affirmation of erlinda, Iconic (affirming the presence of God/Higher Power), Initial/Spiritual assessment, patient floor, Normalization of emotional/spiritual concerns, Prayer (assurance of), Scientologist beliefs/image of God discussed           Plan of Care:     [x] Support spiritual and/or cultural needs    [] Support AMD and/or advance care planning process      [] Support grieving process   [] Coordinate Rites and/or Rituals    [] Coordination with community clergy   [] No spiritual needs identified at this time   [] Detailed Plan of Care below (See Comments)  [] Make referral to Music Therapy  [] Make referral to Pet Therapy     [] Make referral to Addiction services  [] Make referral to [facility]  [] Make referral to Spiritual Care Partner  [] No future visits requested        [x] Follow up visits as needed     Comments: initial visit in [facility] unit for spiritual assessment. No family/friends present. Patient was alert, pleasant and mildly confused. Provided pastoral presence and empathic listening as patient shared her concern about her son-in-law. He is a patient here also. Patient was under the mistaken impression he  this morning. Assisted patient in placing a call to her daughter who clarified that her  is ill and still hospitalized. Patient also shared her sons live with her. She expressed that they are helpful. Her belief in God seems to be a source of support and comfort to her. Offered assurance of prayer. Chaplains available as needed.      SAEED Gaviria, [facility], Staff [address] [place] [address] Avenue    Brigette Pollen Paging Service  [phone]

## 2020-08-27 NOTE — PROGRESS NOTES
Problem: Risk for Spread of Infection  Goal: Prevent transmission of infectious organism to others  Description: Prevent the transmission of infectious organisms to other patients, staff members, and visitors. Outcome: Progressing Towards Goal     Problem: Diabetes Self-Management  Goal: *Disease process and treatment process  Description: Define diabetes and identify own type of diabetes; list 3 options for treating diabetes.   Outcome: Progressing Towards Goal

## 2020-08-27 NOTE — PROGRESS NOTES
[address] END OF SHIFT REPORT  1930: Report received from Rufus Pompa RN assumed care of patient. Bedside shift change report GIVEN TO Baljeet Munson RN. Report included the following information SBAR. SIGNIFICANT CHANGES DURING SHIFT:        CONCERNS TO ADDRESS WITH MD:          [address] NURSING NOTE   Admission Date 8/25/2020   Admission Diagnosis DKA (diabetic ketoacidoses) ([facility] [address] [E11.10]   Consults None      Cardiac Monitoring [x] Yes [] No      Purposeful Hourly Rounding [x] Yes    Tyler Score Total Score: 3   Tyler score 3 or > [x] Bed Alarm [] Avasys [] 1:1 sitter [] Patient refused (Signed refusal form in chart)   Madhu Score Madhu Score: 16   Madhu score 14 or < [] PMT consult [] Wound Care consult    []  Specialty bed  [] Nutrition consult      Influenza Vaccine Received Flu Vaccine for Current Season (usually Sept-March): Not Flu Season           Oxygen needs? [x] Room air Oxygen @  []1L    []2L    []3L   []4L    []5L   []6L via  NC   Chronic home O2 use? [] Yes [] No  Perform O2 challenge test and document in progress note using smartphrase (.Homeoxygen)      Last bowel movement Last Bowel Movement Date: 08/26/20      Urinary Catheter             LDAs           Triple Lumen 08/25/20 Right Neck (Active)   Central Line Being Utilized Yes 08/27/20 0230   Criteria for Appropriate Use Hemodynamically unstable, requiring monitoring lines, vasopressors, or volume resuscitation 08/27/20 0230   Site Assessment Clean, dry, & intact 08/27/20 0230   Infiltration Assessment 0 08/27/20 0230   Affected Extremity/Extremities Color distal to insertion site pink (or appropriate for race); Pulses palpable;Range of motion performed 08/27/20 0230   Date of Last Dressing Change 08/26/20 08/27/20 0230   Dressing Status Clean, dry, & intact 08/27/20 0230   Dressing Type Transparent;Tape;Disk with Chlorhexadine gluconate (CHG) 08/27/20 0230   Action Taken Open ports on tubing capped 08/27/20 0230   Proximal Hub Color/Line Status White; Infusing 08/27/20 0230   Positive Blood Return (Medial Site) Yes 08/27/20 0230   Medial Hub Color/Line Status Blue;Capped; Patent 08/27/20 0230   Positive Blood Return (Lateral Site) Yes 08/27/20 0230   Distal Hub Color/Line Status Carey Shine; Infusing 08/27/20 0230   Positive Blood Return (Site #3) Yes 08/27/20 0230   Alcohol Cap Used Yes 08/26/20 2246        Peripheral IV 08/25/20 Right Forearm (Active)   Site Assessment Clean, dry, & intact 08/27/20 0230   Phlebitis Assessment 0 08/27/20 0230   Infiltration Assessment 0 08/27/20 0230   Dressing Status Clean, dry, & intact 08/27/20 0230   Dressing Type Transparent;Tape 08/27/20 0230   Hub Color/Line Status Patent; Flushed 08/27/20 0230   Action Taken Open ports on tubing capped 08/26/20 1616   Alcohol Cap Used Yes 08/26/20 1616       Peripheral IV 08/25/20 Left;Upper Arm (Active)   Site Assessment Clean, dry, & intact 08/27/20 0230   Phlebitis Assessment 0 08/27/20 0230   Infiltration Assessment 0 08/27/20 0230   Dressing Status Clean, dry, & intact 08/27/20 0230   Dressing Type Transparent;Tape 08/27/20 0230   Hub Color/Line Status Green;Patent; Infusing 08/27/20 0230   Action Taken Open ports on tubing capped 08/26/20 1616   Alcohol Cap Used Yes 08/26/20 1616                         Readmission Risk Assessment Tool Score High Risk            27       Total Score        3 Has Seen PCP in Last 6 Months (Yes=3, No=0)    11 IP Visits Last 12 Months (1-3=4, 4=9, >4=11)    9 Pt. Coverage (Medicare=5 , Medicaid, or Self-Pay=4)    4 Charlson Comorbidity Score (Age + Comorbid Conditions)        Criteria that do not apply:    . Living with Significant Other. Assisted Living. LTAC. SNF.  or   Rehab    Patient Length of Stay (>5 days = 3)       Expected Length of Stay 3d 19h   Actual Length of Stay 2

## 2020-08-27 NOTE — PROGRESS NOTES
11a Dtr Zuly Carpio called me. She states she is covid positive as is her  who is in CCU at [address] She states: she last saw the pt a month ago; she calls her to remind her to take her insulin but she forgets; she has an older brother, Jeaneth Sepulveda, who she will ask to move back in with pt to assist her with medications. He has done this before and she has done well. We discussed options to support the pt such as assisted living(Group home), NHP for long term care, obtaining personal care. She will discuss these options with her brothers. She was receptive to options and more support for the pt. She would like therapy to work with the pt  8/27 D/c plans discussed with MD who feels the frequent admissions are consistent with her DM and medication compliance. He is supportive of PT, OT, and Palliative Care addressing goals of care, plan of care, code status, and mobility//safety. Transition of Care Plan:        -pt has advanced dementia and is in a respiratory isolation room  -I called dtr Iwona Marcy at [phone] who is our main . She will call me back tomorrow. She is under quarantine until Friday as she is covid positive. She states her spouse is also covid positive and is in this hospital.  -I called Kesha Shukla at [facility] at [phone] and left a VM to receive an update  -I called Oxnaa Junior at [facility] at cell [phone] and left a VM to receive an update-8/27 she left me a VM and is unaware of this pt or a referral in June on this pt  -I called [facility]ag[facility] Rizvi, at [phone], spoke with Mattie Yung, who has no record of this pt having an APS consult  -I called [facility] at [phone] to receive an update and left a VM. -per chart, pt lives with her 2 sons. Ishmael at [phone] works during the day and she is left with son Kashmir Aguilar who has an intellectual disability. He apparently is able to do only minimal assist for the pt  -Her dtr Maynor Berrios. @[employer], [phone] works  -per chart, pt lives in a tri level home and may have challenges with medication compliance  -she has Medicare//CCCP Medicaid  -she will benefit from Palliative Care consult to address plan of care//goals. She has had 4 admissions and 4 additional ER visits since January 2020  -it would be of benefit to address her code status  -she is open to [facility] for nursing, PT, OT, and SW.   She will need SPENSER orders and would benefit from [facility] as well  -she may benefit from LTC at a nursing facility now that she has Medicaid in place  -a UAI was completed and sent to [address] [place] [address] in June -29 Anne Doll has been involved with assisting with Medicaid application  -she will need a 2nd IM letter and PCP followup rosario't prior to d/c

## 2020-08-27 NOTE — PALLIATIVE CARE
Family meeting with patient daughter Greg Rain and son Vic Burger over the phone today at 4:10 pm , to get information regarding patient possible non compliance , support at home and future treatment preferences. Morris Isaac is not available today . Patient does not have advance medical directives , she has 4 children / surrogate,  three sons and one daughter,  Devika Tanika is mentally challenged lives with patient .

## 2020-08-28 LAB
ANION GAP SERPL CALC-SCNC: 4 MMOL/L (ref 5–15)
BUN SERPL-MCNC: 13 MG/DL (ref 6–20)
BUN/CREAT SERPL: 18 (ref 12–20)
CALCIUM SERPL-MCNC: 7.3 MG/DL (ref 8.5–10.1)
CHLORIDE SERPL-SCNC: 116 MMOL/L (ref 97–108)
CO2 SERPL-SCNC: 20 MMOL/L (ref 21–32)
CREAT SERPL-MCNC: 0.74 MG/DL (ref 0.55–1.02)
GLUCOSE BLD STRIP.AUTO-MCNC: 120 MG/DL (ref 65–100)
GLUCOSE BLD STRIP.AUTO-MCNC: 201 MG/DL (ref 65–100)
GLUCOSE BLD STRIP.AUTO-MCNC: 209 MG/DL (ref 65–100)
GLUCOSE BLD STRIP.AUTO-MCNC: 274 MG/DL (ref 65–100)
GLUCOSE BLD STRIP.AUTO-MCNC: 58 MG/DL (ref 65–100)
GLUCOSE BLD STRIP.AUTO-MCNC: 66 MG/DL (ref 65–100)
GLUCOSE BLD STRIP.AUTO-MCNC: 78 MG/DL (ref 65–100)
GLUCOSE SERPL-MCNC: 274 MG/DL (ref 65–100)
POTASSIUM SERPL-SCNC: 3.9 MMOL/L (ref 3.5–5.1)
SERVICE CMNT-IMP: ABNORMAL
SERVICE CMNT-IMP: NORMAL
SERVICE CMNT-IMP: NORMAL
SODIUM SERPL-SCNC: 140 MMOL/L (ref 136–145)

## 2020-08-28 PROCEDURE — 74011000258 HC RX REV CODE- 258: Performed by: EMERGENCY MEDICINE

## 2020-08-28 PROCEDURE — 97161 PT EVAL LOW COMPLEX 20 MIN: CPT

## 2020-08-28 PROCEDURE — 74011250636 HC RX REV CODE- 250/636: Performed by: EMERGENCY MEDICINE

## 2020-08-28 PROCEDURE — 74011636637 HC RX REV CODE- 636/637: Performed by: INTERNAL MEDICINE

## 2020-08-28 PROCEDURE — 74011250637 HC RX REV CODE- 250/637: Performed by: INTERNAL MEDICINE

## 2020-08-28 PROCEDURE — 36415 COLL VENOUS BLD VENIPUNCTURE: CPT

## 2020-08-28 PROCEDURE — 97116 GAIT TRAINING THERAPY: CPT

## 2020-08-28 PROCEDURE — 80048 BASIC METABOLIC PNL TOTAL CA: CPT

## 2020-08-28 PROCEDURE — 97530 THERAPEUTIC ACTIVITIES: CPT

## 2020-08-28 PROCEDURE — 74011250636 HC RX REV CODE- 250/636: Performed by: INTERNAL MEDICINE

## 2020-08-28 PROCEDURE — 65660000001 HC RM ICU INTERMED STEPDOWN

## 2020-08-28 PROCEDURE — 82962 GLUCOSE BLOOD TEST: CPT

## 2020-08-28 PROCEDURE — 74011000258 HC RX REV CODE- 258: Performed by: INTERNAL MEDICINE

## 2020-08-28 RX ORDER — LOPERAMIDE HYDROCHLORIDE 2 MG/1
4 CAPSULE ORAL AS NEEDED
Status: DISCONTINUED | OUTPATIENT
Start: 2020-08-28 | End: 2020-08-29 | Stop reason: HOSPADM

## 2020-08-28 RX ADMIN — LEVOTHYROXINE SODIUM 175 MCG: 0.15 TABLET ORAL at 05:50

## 2020-08-28 RX ADMIN — INSULIN GLARGINE 20 UNITS: 100 INJECTION, SOLUTION SUBCUTANEOUS at 08:15

## 2020-08-28 RX ADMIN — HEPARIN SODIUM 5000 UNITS: 5000 INJECTION INTRAVENOUS; SUBCUTANEOUS at 03:00

## 2020-08-28 RX ADMIN — INSULIN LISPRO 4 UNITS: 100 INJECTION, SOLUTION INTRAVENOUS; SUBCUTANEOUS at 08:15

## 2020-08-28 RX ADMIN — INSULIN LISPRO 4 UNITS: 100 INJECTION, SOLUTION INTRAVENOUS; SUBCUTANEOUS at 11:58

## 2020-08-28 RX ADMIN — HEPARIN SODIUM 5000 UNITS: 5000 INJECTION INTRAVENOUS; SUBCUTANEOUS at 17:17

## 2020-08-28 RX ADMIN — CLOPIDOGREL BISULFATE 75 MG: 75 TABLET ORAL at 08:14

## 2020-08-28 RX ADMIN — ACETAMINOPHEN 650 MG: 325 TABLET ORAL at 21:40

## 2020-08-28 RX ADMIN — PRAVASTATIN SODIUM 40 MG: 40 TABLET ORAL at 21:40

## 2020-08-28 RX ADMIN — HEPARIN SODIUM 5000 UNITS: 5000 INJECTION INTRAVENOUS; SUBCUTANEOUS at 08:31

## 2020-08-28 RX ADMIN — PIPERACILLIN AND TAZOBACTAM 3.38 G: 3; .375 INJECTION, POWDER, LYOPHILIZED, FOR SOLUTION INTRAVENOUS at 05:00

## 2020-08-28 RX ADMIN — POTASSIUM CHLORIDE: 2 INJECTION, SOLUTION, CONCENTRATE INTRAVENOUS at 05:54

## 2020-08-28 RX ADMIN — BRIMONIDINE TARTRATE 1 DROP: 2 SOLUTION OPHTHALMIC at 08:18

## 2020-08-28 RX ADMIN — Medication 1 CAPSULE: at 11:59

## 2020-08-28 RX ADMIN — BRIMONIDINE TARTRATE 1 DROP: 2 SOLUTION OPHTHALMIC at 21:40

## 2020-08-28 NOTE — PROGRESS NOTES
General Daily Progress Note    Admit Date: 8/25/2020    Subjective:     Patient has no complaint . Kulwinder Campbell Current Facility-Administered Medications   Medication Dose Route Frequency    loperamide (IMODIUM) capsule 4 mg  4 mg Oral PRN    insulin lispro (HUMALOG) injection 4 Units  4 Units SubCUTAneous TIDAC    0.45% sodium chloride 1,000 mL with potassium chloride 40 mEq infusion   IntraVENous CONTINUOUS    insulin glargine (LANTUS) injection 20 Units  20 Units SubCUTAneous DAILY    glucose chewable tablet 16 g  4 Tab Oral PRN    dextrose (D50W) injection syrg 12.5-25 g  25-50 mL IntraVENous PRN    glucagon (GLUCAGEN) injection 1 mg  1 mg IntraMUSCular PRN    brimonidine (ALPHAGAN) 0.2 % ophthalmic solution 1 Drop  1 Drop Both Eyes BID    clopidogreL (PLAVIX) tablet 75 mg  75 mg Oral DAILY    levothyroxine (SYNTHROID) tablet 175 mcg  175 mcg Oral 6am    pravastatin (PRAVACHOL) tablet 40 mg  40 mg Oral QHS    heparin (porcine) injection 5,000 Units  5,000 Units SubCUTAneous Q8H    ondansetron (ZOFRAN) injection 4 mg  4 mg IntraVENous Q4H PRN    acetaminophen (TYLENOL) tablet 650 mg  650 mg Oral Q6H PRN        Review of Systems  A comprehensive review of systems was negative. Objective:     Patient Vitals for the past 24 hrs:   BP Temp Pulse Resp SpO2   08/28/20 0756 175/75 98.1 °F (36.7 °C) 71 18 99 %   08/28/20 0730 183/84 98 °F (36.7 °C) 73 18 99 %   08/28/20 0400 157/88 98.8 °F (37.1 °C) 69 19 99 %   08/28/20 0000 159/69 98 °F (36.7 °C) 88 18 97 %   08/27/20 2349  98.2 °F (36.8 °C) 74 19 99 %   08/27/20 1936 168/79 98 °F (36.7 °C) 86 19 100 %   08/27/20 1501 143/72 98.1 °F (36.7 °C) 91 16 100 %   08/27/20 1458   91 18 100 %   08/27/20 1032 134/67 98.9 °F (37.2 °C) 81 18 100 %     No intake/output data recorded. 08/26 1901 - 08/28 0700  In: 1006 [P.O.:240;  I.V.:766]  Out: 400 [Urine:400]    Physical Exam:   Visit Vitals  /75 (BP 1 Location: Left arm, BP Patient Position: At rest)   Pulse 71 Temp 98.1 °F (36.7 °C)   Resp 18   Ht 5' 3\" (1.6 m)   Wt 131 lb 9.8 oz (59.7 kg)   SpO2 99%   BMI 23.31 kg/m²     General appearance: alert, cooperative, no distress, appears stated age  Neck: supple, symmetrical, trachea midline, no adenopathy, thyroid: not enlarged, symmetric, no tenderness/mass/nodules, no carotid bruit and no JVD  Lungs: clear to auscultation bilaterally  Heart: regular rate and rhythm, S1, S2 normal, no murmur, click, rub or gallop  Abdomen: soft, non-tender. Bowel sounds normal. No masses,  no organomegaly  Extremities: extremities normal, atraumatic, no cyanosis or edema    Assessment:     Active Problems:    DKA (diabetic ketoacidoses) ([facility] [address] (6/2/2017)        Plan:     1. Continue diabetic control. CO2 is now 20.  2.  Hydration adequate. No evidence of CHF. 3.  Caloric intake improving. 4.  Antibiotic discontinued. No evidence of infection. 5.  If all goes well discharge tomorrow.

## 2020-08-28 NOTE — PROGRESS NOTES
ALEKSANDAR: Resumption orders for home health    12:30p CM contacted pt son Jody Valdez and spoke with him regarding personal care agencies. This writer has emailed him a listing of agencies and he plans to contact them to see who is in network with the pt's Kandice Burrell. Resumption of care orders for Home health are needed at time of discharge. Pt was open to [facility]. Medicare pt has received, reviewed, and signed 2nd IM letter informing them of their right to appeal the discharge. Signed copy has been placed on pt bedside chart. Yoselin Hall  [address] [place] [address]

## 2020-08-29 VITALS
DIASTOLIC BLOOD PRESSURE: 85 MMHG | HEIGHT: 63 IN | OXYGEN SATURATION: 97 % | RESPIRATION RATE: 16 BRPM | WEIGHT: 131.61 LBS | TEMPERATURE: 98.2 F | HEART RATE: 77 BPM | BODY MASS INDEX: 23.32 KG/M2 | SYSTOLIC BLOOD PRESSURE: 153 MMHG

## 2020-08-29 LAB
ANION GAP SERPL CALC-SCNC: 3 MMOL/L (ref 5–15)
BUN SERPL-MCNC: 14 MG/DL (ref 6–20)
BUN/CREAT SERPL: 18 (ref 12–20)
CALCIUM SERPL-MCNC: 7.5 MG/DL (ref 8.5–10.1)
CHLORIDE SERPL-SCNC: 112 MMOL/L (ref 97–108)
CO2 SERPL-SCNC: 23 MMOL/L (ref 21–32)
CREAT SERPL-MCNC: 0.78 MG/DL (ref 0.55–1.02)
GLUCOSE BLD STRIP.AUTO-MCNC: 155 MG/DL (ref 65–100)
GLUCOSE BLD STRIP.AUTO-MCNC: 248 MG/DL (ref 65–100)
GLUCOSE SERPL-MCNC: 222 MG/DL (ref 65–100)
POTASSIUM SERPL-SCNC: 3.9 MMOL/L (ref 3.5–5.1)
SERVICE CMNT-IMP: ABNORMAL
SERVICE CMNT-IMP: ABNORMAL
SODIUM SERPL-SCNC: 138 MMOL/L (ref 136–145)

## 2020-08-29 PROCEDURE — 74011250637 HC RX REV CODE- 250/637: Performed by: INTERNAL MEDICINE

## 2020-08-29 PROCEDURE — 82962 GLUCOSE BLOOD TEST: CPT

## 2020-08-29 PROCEDURE — 74011250636 HC RX REV CODE- 250/636: Performed by: INTERNAL MEDICINE

## 2020-08-29 PROCEDURE — 36415 COLL VENOUS BLD VENIPUNCTURE: CPT

## 2020-08-29 PROCEDURE — 74011636637 HC RX REV CODE- 636/637: Performed by: INTERNAL MEDICINE

## 2020-08-29 PROCEDURE — 80048 BASIC METABOLIC PNL TOTAL CA: CPT

## 2020-08-29 PROCEDURE — 74011000258 HC RX REV CODE- 258: Performed by: INTERNAL MEDICINE

## 2020-08-29 RX ORDER — FUROSEMIDE 40 MG/1
40 TABLET ORAL ONCE
Status: COMPLETED | OUTPATIENT
Start: 2020-08-29 | End: 2020-08-29

## 2020-08-29 RX ORDER — INSULIN LISPRO 100 [IU]/ML
INJECTION, SOLUTION INTRAVENOUS; SUBCUTANEOUS
Qty: 2 ADJUSTABLE DOSE PRE-FILLED PEN SYRINGE | Refills: 11 | Status: ON HOLD | OUTPATIENT
Start: 2020-08-29 | End: 2020-09-21 | Stop reason: SDUPTHER

## 2020-08-29 RX ORDER — POTASSIUM CHLORIDE 20 MEQ/1
20 TABLET, EXTENDED RELEASE ORAL
Status: COMPLETED | OUTPATIENT
Start: 2020-08-29 | End: 2020-08-29

## 2020-08-29 RX ORDER — POTASSIUM CHLORIDE 750 MG/1
10 TABLET, EXTENDED RELEASE ORAL DAILY
Qty: 6 TAB | Refills: 0 | Status: SHIPPED | OUTPATIENT
Start: 2020-08-29 | End: 2020-09-04

## 2020-08-29 RX ORDER — INSULIN DEGLUDEC INJECTION 100 U/ML
15 INJECTION, SOLUTION SUBCUTANEOUS DAILY
Qty: 2 ADJUSTABLE DOSE PRE-FILLED PEN SYRINGE | Refills: 11 | Status: ON HOLD | OUTPATIENT
Start: 2020-08-29 | End: 2020-09-21 | Stop reason: SDUPTHER

## 2020-08-29 RX ORDER — FUROSEMIDE 20 MG/1
TABLET ORAL
Qty: 6 TAB | Refills: 0 | Status: SHIPPED | OUTPATIENT
Start: 2020-08-29 | End: 2020-09-21

## 2020-08-29 RX ADMIN — INSULIN LISPRO 4 UNITS: 100 INJECTION, SOLUTION INTRAVENOUS; SUBCUTANEOUS at 12:39

## 2020-08-29 RX ADMIN — FUROSEMIDE 40 MG: 40 TABLET ORAL at 12:37

## 2020-08-29 RX ADMIN — Medication 1 CAPSULE: at 09:20

## 2020-08-29 RX ADMIN — INSULIN GLARGINE 20 UNITS: 100 INJECTION, SOLUTION SUBCUTANEOUS at 09:24

## 2020-08-29 RX ADMIN — HEPARIN SODIUM 5000 UNITS: 5000 INJECTION INTRAVENOUS; SUBCUTANEOUS at 01:35

## 2020-08-29 RX ADMIN — CLOPIDOGREL BISULFATE 75 MG: 75 TABLET ORAL at 09:20

## 2020-08-29 RX ADMIN — LEVOTHYROXINE SODIUM 175 MCG: 0.15 TABLET ORAL at 06:00

## 2020-08-29 RX ADMIN — INSULIN LISPRO 4 UNITS: 100 INJECTION, SOLUTION INTRAVENOUS; SUBCUTANEOUS at 09:24

## 2020-08-29 RX ADMIN — POTASSIUM CHLORIDE: 2 INJECTION, SOLUTION, CONCENTRATE INTRAVENOUS at 06:07

## 2020-08-29 RX ADMIN — POTASSIUM CHLORIDE 20 MEQ: 20 TABLET, EXTENDED RELEASE ORAL at 12:37

## 2020-08-29 RX ADMIN — BRIMONIDINE TARTRATE 1 DROP: 2 SOLUTION OPHTHALMIC at 09:21

## 2020-08-29 NOTE — DISCHARGE SUMMARY
[address] SUMMARY    Name:  Tico Kwan  MR#:  [id number]  :  1940  ACCOUNT #:  [de-identified]  ADMIT DATE:  2020  DISCHARGE DATE:  2020    HISTORY OF PRESENT ILLNESS:  The patient is a 45-year-old partially demented female who has type 1B diabetes, presented to the emergency room complaining of lethargy, confusion, and increasing blood sugars. Unfortunately, she does not follow up at the office and she does not have the type of support at home that will promote reasonable diabetic control. She presented and was found to be in diabetic ketoacidosis, which is a common phenomena with this patient. Efforts to improve this status had been futile primarily because of the lack of home support. One additional factor when she was admitted is the fact that she had a temperature of 101 degrees Fahrenheit, but no obvious source was found at the time. She was empirically started on antibiotics. Past medical history, social history, review of systems, family history, physical examination are as in admitting H and P.    LABORATORY VALUES:  Initial hemoglobin is 12.8, white count 15.2, MCV was 101.0 with following differential of 80 segs, 9 lymphocytes, 9 monocytes, and 2 immature granulocytes. Abnormalities in the CMP on admission is that a potassium of 6.3, BUN and creatinine of 34 and 1.67, and blood sugar of 732. There was slight increase in AST. Lactic acid was 5.2. Hemoglobin A1c was 12.3. At the time of discharge, CO2 was 23, BUN and creatinine of 14 and 0.78 respectively. Blood cultures were negative. RADIOGRAPHS:  CT scan of the head, nothing acute was noted. Initial chest x-ray, no acute findings. Repeat chest x-ray with a central line in place. HOSPITAL COURSE:  The patient was admitted and placed on an insulin drip with Glucommander directions. She was also hydrated. With this, her acidosis gradually improved as was expected. Unfortunately, there was more rapid reduction in her blood sugar with a slower improvement in the acidosis. As a result, she remained on the insulin drip longer than it would have been anticipated because of this. Unfortunately, the insulin drip was stopped and observation was made of the blood sugars, which unfortunately would not correct the acidosis. Once the insulin drip was started again, the CO2 gradually increased. She was weaned off the insulin drip, started back on a basal bolus preparation where she continued to improve. Her mental status returned to baseline and she began to eat and drink in usual fashion. There was no evidence of any type of secondary infection. A rapid COVID screening test was negative. At the time of discharge, she was back to her baseline. She was seen in consultation by Palliative Care and her code status was lowered to a DNR. We discussed with the family about the need for more supervision and they are in the process of thinking about potential options. FINAL DIAGNOSES:  1. Diabetic ketoacidosis. 2.  Dehydration. 3.  Medical noncompliance. 4.  Severe dementia. 5.  Primary hypertension. 6.  Hypothyroidism. DISPOSITION:  1. The patient will be discharged home, ambulatory, on an ADA diet. 2.  She is a DNR. 3.  I emphasized the importance of eating meals at the same time every day. 4.  Discharge medications include the following, amlodipine 10 mg daily, Metoprolol Succinate 25 mg daily, Alphagan 0.15% one drop both eyes b.i.d., clopidogrel 75 mg daily, Tresiba 50 units daily, Humalog 4 units before meals, Synthroid 0.175 mg daily, pravastatin 80 mg at bedtime, furosemide 20 mg daily for six days, KCl 10 mEq daily for six days, and Jennyfer-Q one tablet daily for 20 days. 5.  The patient will return to the office in the next five days. 6.  I will have further discussions with the family.   Ideally, she probably needs to be in an assisted living type setting rather than a current setting, but I do not think the family is going to agree to this.         Elias Claude, MD      [id number]  D:  08/29/2020 10:38  T:  08/29/2020 17:40  JOB #:  [id number]

## 2020-08-29 NOTE — ROUTINE PROCESS
Bedside shift change report given to Bee (oncoming nurse) by Melisa GARCIA RN (offgoing nurse). Report included the following information SBAR, Kardex and MAR.

## 2020-08-29 NOTE — DISCHARGE INSTRUCTIONS
General Discharge Instructions    Patient ID:  Smiley Stephenson  [id number]  67 y.o.  1940    Patient Instructions        The following personal items were collected during your admission and were returned to you. Take Home Medications           What to do at Home    Recommended diet:ADA diet---eat at same time daily to include all meals and bedtime snack    Recommended activity: Activity as tolerated    Follow-up with Alberto Leventhal, MD  in 3 days. Information obtained by :  I understand that if any problems occur once I am at home I am to contact my physician. I understand and acknowledge receipt of the instructions indicated above.                                                                                                                                            Physician's or R.N.'s Signature                                                                  Date/Time                                                                                                                                              Patient or Representative Signature                                                          Date/Time

## 2020-08-29 NOTE — PROGRESS NOTES
15: 30PM Outbound call to [facility] intake dept. Spoke to \"Monse\". Inquired if SPENSER orders has been recv'd on patient. SW informed \"patient isn't open w/us for [facility] services. Last opened for service was July 15th, 2020. \"  SW acknowledged understanding. SW to consult w/assigned RN re: HH. Patient to follow up w/PCP for inpatient admission.             Kamari Ervin, MSW

## 2020-08-30 LAB
BACTERIA SPEC CULT: NORMAL
SERVICE CMNT-IMP: NORMAL

## 2020-08-31 ENCOUNTER — PATIENT OUTREACH (OUTPATIENT)
Dept: CASE MANAGEMENT | Age: 80
End: 2020-08-31

## 2020-08-31 PROBLEM — E10.649 HYPOGLYCEMIA UNAWARENESS IN TYPE 1 DIABETES MELLITUS (HCC): Status: ACTIVE | Noted: 2020-08-31

## 2020-09-01 ENCOUNTER — APPOINTMENT (OUTPATIENT)
Dept: GENERAL RADIOLOGY | Age: 80
DRG: 638 | End: 2020-09-01
Attending: EMERGENCY MEDICINE
Payer: MEDICARE

## 2020-09-01 ENCOUNTER — HOSPITAL ENCOUNTER (INPATIENT)
Age: 80
LOS: 3 days | Discharge: HOME HEALTH | DRG: 638 | End: 2020-09-04
Attending: EMERGENCY MEDICINE | Admitting: GENERAL ACUTE CARE HOSPITAL
Payer: MEDICARE

## 2020-09-01 DIAGNOSIS — R65.10 SIRS (SYSTEMIC INFLAMMATORY RESPONSE SYNDROME) (HCC): ICD-10-CM

## 2020-09-01 DIAGNOSIS — Z66 DNR NO CODE (DO NOT RESUSCITATE): ICD-10-CM

## 2020-09-01 DIAGNOSIS — E10.10 TYPE 1 DIABETES MELLITUS WITH KETOACIDOSIS WITHOUT COMA (HCC): Primary | ICD-10-CM

## 2020-09-01 LAB
ALBUMIN SERPL-MCNC: 3.3 G/DL (ref 3.5–5)
ALBUMIN/GLOB SERPL: 1.1 {RATIO} (ref 1.1–2.2)
ALP SERPL-CCNC: 151 U/L (ref 45–117)
ALT SERPL-CCNC: 30 U/L (ref 12–78)
ANION GAP SERPL CALC-SCNC: 12 MMOL/L (ref 5–15)
ANION GAP SERPL CALC-SCNC: 22 MMOL/L (ref 5–15)
ANION GAP SERPL CALC-SCNC: 4 MMOL/L (ref 5–15)
ANION GAP SERPL CALC-SCNC: ABNORMAL MMOL/L (ref 5–15)
APPEARANCE UR: ABNORMAL
ARTERIAL PATENCY WRIST A: YES
AST SERPL-CCNC: 23 U/L (ref 15–37)
ATRIAL RATE: 104 BPM
BACTERIA URNS QL MICRO: NEGATIVE /HPF
BASOPHILS # BLD: 0 K/UL (ref 0–0.1)
BASOPHILS NFR BLD: 0 % (ref 0–1)
BDY SITE: ABNORMAL
BILIRUB SERPL-MCNC: 0.5 MG/DL (ref 0.2–1)
BILIRUB UR QL: NEGATIVE
BUN BLD-MCNC: 25 MG/DL (ref 9–20)
BUN SERPL-MCNC: 19 MG/DL (ref 6–20)
BUN SERPL-MCNC: 24 MG/DL (ref 6–20)
BUN SERPL-MCNC: 28 MG/DL (ref 6–20)
BUN SERPL-MCNC: 29 MG/DL (ref 6–20)
BUN/CREAT SERPL: 18 (ref 12–20)
BUN/CREAT SERPL: 19 (ref 12–20)
BUN/CREAT SERPL: 20 (ref 12–20)
BUN/CREAT SERPL: 20 (ref 12–20)
CA-I BLD-MCNC: 1.11 MMOL/L (ref 1.12–1.32)
CA-I BLD-SCNC: 1.16 MMOL/L (ref 1.12–1.32)
CALCIUM SERPL-MCNC: 7.7 MG/DL (ref 8.5–10.1)
CALCIUM SERPL-MCNC: 8 MG/DL (ref 8.5–10.1)
CALCIUM SERPL-MCNC: 8 MG/DL (ref 8.5–10.1)
CALCIUM SERPL-MCNC: 8.5 MG/DL (ref 8.5–10.1)
CALCULATED P AXIS, ECG09: 37 DEGREES
CALCULATED R AXIS, ECG10: -51 DEGREES
CALCULATED T AXIS, ECG11: 23 DEGREES
CHLORIDE BLD-SCNC: 97 MMOL/L (ref 98–107)
CHLORIDE SERPL-SCNC: 100 MMOL/L (ref 97–108)
CHLORIDE SERPL-SCNC: 106 MMOL/L (ref 97–108)
CHLORIDE SERPL-SCNC: 108 MMOL/L (ref 97–108)
CHLORIDE SERPL-SCNC: 91 MMOL/L (ref 97–108)
CO2 SERPL-SCNC: 13 MMOL/L (ref 21–32)
CO2 SERPL-SCNC: 21 MMOL/L (ref 21–32)
CO2 SERPL-SCNC: 27 MMOL/L (ref 21–32)
CO2 SERPL-SCNC: <5 MMOL/L (ref 21–32)
COLOR UR: ABNORMAL
CREAT BLD-MCNC: 1.1 MG/DL (ref 0.6–1.3)
CREAT SERPL-MCNC: 0.97 MG/DL (ref 0.55–1.02)
CREAT SERPL-MCNC: 1.18 MG/DL (ref 0.55–1.02)
CREAT SERPL-MCNC: 1.46 MG/DL (ref 0.55–1.02)
CREAT SERPL-MCNC: 1.58 MG/DL (ref 0.55–1.02)
DIAGNOSIS, 93000: NORMAL
DIFFERENTIAL METHOD BLD: ABNORMAL
EOSINOPHIL # BLD: 0 K/UL (ref 0–0.4)
EOSINOPHIL NFR BLD: 0 % (ref 0–7)
EPITH CASTS URNS QL MICRO: ABNORMAL /LPF
ERYTHROCYTE [DISTWIDTH] IN BLOOD BY AUTOMATED COUNT: 14.8 % (ref 11.5–14.5)
GAS FLOW.O2 O2 DELIVERY SYS: ABNORMAL L/MIN
GLOBULIN SER CALC-MCNC: 3 G/DL (ref 2–4)
GLUCOSE BLD STRIP.AUTO-MCNC: 101 MG/DL (ref 65–100)
GLUCOSE BLD STRIP.AUTO-MCNC: 102 MG/DL (ref 65–100)
GLUCOSE BLD STRIP.AUTO-MCNC: 138 MG/DL (ref 65–100)
GLUCOSE BLD STRIP.AUTO-MCNC: 210 MG/DL (ref 65–100)
GLUCOSE BLD STRIP.AUTO-MCNC: 275 MG/DL (ref 65–100)
GLUCOSE BLD STRIP.AUTO-MCNC: 336 MG/DL (ref 65–100)
GLUCOSE BLD STRIP.AUTO-MCNC: 422 MG/DL (ref 65–100)
GLUCOSE BLD STRIP.AUTO-MCNC: 484 MG/DL (ref 65–100)
GLUCOSE BLD STRIP.AUTO-MCNC: 557 MG/DL (ref 65–100)
GLUCOSE BLD STRIP.AUTO-MCNC: 58 MG/DL (ref 65–100)
GLUCOSE BLD STRIP.AUTO-MCNC: 83 MG/DL (ref 65–100)
GLUCOSE BLD STRIP.AUTO-MCNC: 89 MG/DL (ref 65–100)
GLUCOSE BLD STRIP.AUTO-MCNC: 96 MG/DL (ref 65–100)
GLUCOSE BLD STRIP.AUTO-MCNC: >600 MG/DL (ref 65–100)
GLUCOSE BLD-MCNC: >700 MG/DL (ref 65–100)
GLUCOSE SERPL-MCNC: 230 MG/DL (ref 65–100)
GLUCOSE SERPL-MCNC: 536 MG/DL (ref 65–100)
GLUCOSE SERPL-MCNC: 78 MG/DL (ref 65–100)
GLUCOSE SERPL-MCNC: 891 MG/DL (ref 65–100)
GLUCOSE UR STRIP.AUTO-MCNC: >1000 MG/DL
HCT VFR BLD AUTO: 33.3 % (ref 35–47)
HCT VFR BLD CALC: 33 % (ref 35–47)
HGB BLD-MCNC: 10.7 G/DL (ref 11.5–16)
HGB UR QL STRIP: ABNORMAL
HYALINE CASTS URNS QL MICRO: ABNORMAL /LPF (ref 0–5)
IMM GRANULOCYTES # BLD AUTO: 0 K/UL (ref 0–0.04)
IMM GRANULOCYTES NFR BLD AUTO: 0 % (ref 0–0.5)
KETONES UR QL STRIP.AUTO: 15 MG/DL
LACTATE BLD-SCNC: 7.17 MMOL/L (ref 0.4–2)
LACTATE SERPL-SCNC: 1.4 MMOL/L (ref 0.4–2)
LACTATE SERPL-SCNC: 2.2 MMOL/L (ref 0.4–2)
LACTATE SERPL-SCNC: 4.9 MMOL/L (ref 0.4–2)
LEUKOCYTE ESTERASE UR QL STRIP.AUTO: NEGATIVE
LYMPHOCYTES # BLD: 1.1 K/UL (ref 0.8–3.5)
LYMPHOCYTES NFR BLD: 7 % (ref 12–49)
MAGNESIUM SERPL-MCNC: 2.4 MG/DL (ref 1.6–2.4)
MCH RBC QN AUTO: 31.8 PG (ref 26–34)
MCHC RBC AUTO-ENTMCNC: 32.1 G/DL (ref 30–36.5)
MCV RBC AUTO: 99.1 FL (ref 80–99)
METAMYELOCYTES NFR BLD MANUAL: 7 %
MONOCYTES # BLD: 1.1 K/UL (ref 0–1)
MONOCYTES NFR BLD: 7 % (ref 5–13)
MUCOUS THREADS URNS QL MICRO: ABNORMAL /LPF
MYELOCYTES NFR BLD MANUAL: 4 %
NEUTS BAND NFR BLD MANUAL: 7 %
NEUTS SEG # BLD: 11.9 K/UL (ref 1.8–8)
NEUTS SEG NFR BLD: 68 % (ref 32–75)
NITRITE UR QL STRIP.AUTO: NEGATIVE
NRBC # BLD: 0.02 K/UL (ref 0–0.01)
NRBC BLD-RTO: 0.1 PER 100 WBC
P-R INTERVAL, ECG05: 154 MS
PCO2 BLD: <15 MMHG (ref 35–45)
PH BLD: 7.06 [PH] (ref 7.35–7.45)
PH UR STRIP: 5 [PH] (ref 5–8)
PLATELET # BLD AUTO: 303 K/UL (ref 150–400)
PMV BLD AUTO: 10.5 FL (ref 8.9–12.9)
PO2 BLD: 124 MMHG (ref 80–100)
POTASSIUM BLD-SCNC: 6 MMOL/L (ref 3.5–5.1)
POTASSIUM SERPL-SCNC: 3.2 MMOL/L (ref 3.5–5.1)
POTASSIUM SERPL-SCNC: 3.3 MMOL/L (ref 3.5–5.1)
POTASSIUM SERPL-SCNC: 3.9 MMOL/L (ref 3.5–5.1)
POTASSIUM SERPL-SCNC: 6 MMOL/L (ref 3.5–5.1)
PROT SERPL-MCNC: 6.3 G/DL (ref 6.4–8.2)
PROT UR STRIP-MCNC: 30 MG/DL
Q-T INTERVAL, ECG07: 354 MS
QRS DURATION, ECG06: 92 MS
QTC CALCULATION (BEZET), ECG08: 465 MS
RBC # BLD AUTO: 3.36 M/UL (ref 3.8–5.2)
RBC #/AREA URNS HPF: ABNORMAL /HPF (ref 0–5)
RBC MORPH BLD: ABNORMAL
SERVICE CMNT-IMP: ABNORMAL
SERVICE CMNT-IMP: NORMAL
SODIUM BLD-SCNC: 125 MMOL/L (ref 136–145)
SODIUM SERPL-SCNC: 127 MMOL/L (ref 136–145)
SODIUM SERPL-SCNC: 135 MMOL/L (ref 136–145)
SODIUM SERPL-SCNC: 139 MMOL/L (ref 136–145)
SODIUM SERPL-SCNC: 139 MMOL/L (ref 136–145)
SP GR UR REFRACTOMETRY: 1.02 (ref 1–1.03)
SPECIMEN TYPE: ABNORMAL
TOTAL RESP. RATE, ITRR: 30
TROPONIN I SERPL-MCNC: <0.05 NG/ML
UROBILINOGEN UR QL STRIP.AUTO: 0.2 EU/DL (ref 0.2–1)
VENTRICULAR RATE, ECG03: 104 BPM
WBC # BLD AUTO: 15.8 K/UL (ref 3.6–11)
WBC MORPH BLD: ABNORMAL
WBC URNS QL MICRO: ABNORMAL /HPF (ref 0–4)
YEAST BUDDING URNS QL: PRESENT

## 2020-09-01 PROCEDURE — 83605 ASSAY OF LACTIC ACID: CPT

## 2020-09-01 PROCEDURE — 82803 BLOOD GASES ANY COMBINATION: CPT

## 2020-09-01 PROCEDURE — 80053 COMPREHEN METABOLIC PANEL: CPT

## 2020-09-01 PROCEDURE — 74011000258 HC RX REV CODE- 258: Performed by: GENERAL ACUTE CARE HOSPITAL

## 2020-09-01 PROCEDURE — 74011636637 HC RX REV CODE- 636/637: Performed by: EMERGENCY MEDICINE

## 2020-09-01 PROCEDURE — 71045 X-RAY EXAM CHEST 1 VIEW: CPT

## 2020-09-01 PROCEDURE — 87077 CULTURE AEROBIC IDENTIFY: CPT

## 2020-09-01 PROCEDURE — 74011636637 HC RX REV CODE- 636/637: Performed by: GENERAL ACUTE CARE HOSPITAL

## 2020-09-01 PROCEDURE — 80047 BASIC METABLC PNL IONIZED CA: CPT

## 2020-09-01 PROCEDURE — 82962 GLUCOSE BLOOD TEST: CPT

## 2020-09-01 PROCEDURE — 02HV33Z INSERTION OF INFUSION DEVICE INTO SUPERIOR VENA CAVA, PERCUTANEOUS APPROACH: ICD-10-PCS | Performed by: INTERNAL MEDICINE

## 2020-09-01 PROCEDURE — 74011250636 HC RX REV CODE- 250/636: Performed by: EMERGENCY MEDICINE

## 2020-09-01 PROCEDURE — 81001 URINALYSIS AUTO W/SCOPE: CPT

## 2020-09-01 PROCEDURE — 74011000258 HC RX REV CODE- 258: Performed by: EMERGENCY MEDICINE

## 2020-09-01 PROCEDURE — 36573 INSJ PICC RS&I 5 YR+: CPT | Performed by: GENERAL ACUTE CARE HOSPITAL

## 2020-09-01 PROCEDURE — 87186 SC STD MICRODIL/AGAR DIL: CPT

## 2020-09-01 PROCEDURE — 77030018786 HC NDL GD F/USND BARD -B

## 2020-09-01 PROCEDURE — 77030019905 HC CATH URETH INTMIT MDII -A

## 2020-09-01 PROCEDURE — 74011250636 HC RX REV CODE- 250/636: Performed by: GENERAL ACUTE CARE HOSPITAL

## 2020-09-01 PROCEDURE — 74011000250 HC RX REV CODE- 250: Performed by: EMERGENCY MEDICINE

## 2020-09-01 PROCEDURE — 76937 US GUIDE VASCULAR ACCESS: CPT

## 2020-09-01 PROCEDURE — 85025 COMPLETE CBC W/AUTO DIFF WBC: CPT

## 2020-09-01 PROCEDURE — C1751 CATH, INF, PER/CENT/MIDLINE: HCPCS

## 2020-09-01 PROCEDURE — 93005 ELECTROCARDIOGRAM TRACING: CPT

## 2020-09-01 PROCEDURE — 36600 WITHDRAWAL OF ARTERIAL BLOOD: CPT

## 2020-09-01 PROCEDURE — 84484 ASSAY OF TROPONIN QUANT: CPT

## 2020-09-01 PROCEDURE — 96374 THER/PROPH/DIAG INJ IV PUSH: CPT

## 2020-09-01 PROCEDURE — 87040 BLOOD CULTURE FOR BACTERIA: CPT

## 2020-09-01 PROCEDURE — 51701 INSERT BLADDER CATHETER: CPT

## 2020-09-01 PROCEDURE — 83735 ASSAY OF MAGNESIUM: CPT

## 2020-09-01 PROCEDURE — 80048 BASIC METABOLIC PNL TOTAL CA: CPT

## 2020-09-01 PROCEDURE — 74011250637 HC RX REV CODE- 250/637: Performed by: GENERAL ACUTE CARE HOSPITAL

## 2020-09-01 PROCEDURE — 65660000001 HC RM ICU INTERMED STEPDOWN

## 2020-09-01 PROCEDURE — 96361 HYDRATE IV INFUSION ADD-ON: CPT

## 2020-09-01 PROCEDURE — 99285 EMERGENCY DEPT VISIT HI MDM: CPT

## 2020-09-01 PROCEDURE — 36415 COLL VENOUS BLD VENIPUNCTURE: CPT

## 2020-09-01 RX ORDER — CLOPIDOGREL BISULFATE 75 MG/1
75 TABLET ORAL DAILY
Status: DISCONTINUED | OUTPATIENT
Start: 2020-09-01 | End: 2020-09-04 | Stop reason: HOSPADM

## 2020-09-01 RX ORDER — SODIUM BICARBONATE 1 MEQ/ML
50 SYRINGE (ML) INTRAVENOUS ONCE
Status: CANCELLED | OUTPATIENT
Start: 2020-09-01 | End: 2020-09-01

## 2020-09-01 RX ORDER — ENOXAPARIN SODIUM 100 MG/ML
30 INJECTION SUBCUTANEOUS DAILY
Status: DISCONTINUED | OUTPATIENT
Start: 2020-09-02 | End: 2020-09-04 | Stop reason: HOSPADM

## 2020-09-01 RX ORDER — LEVOFLOXACIN 5 MG/ML
750 INJECTION, SOLUTION INTRAVENOUS
Status: DISCONTINUED | OUTPATIENT
Start: 2020-09-01 | End: 2020-09-01

## 2020-09-01 RX ORDER — CALCIUM GLUCONATE 94 MG/ML
1 INJECTION, SOLUTION INTRAVENOUS
Status: COMPLETED | OUTPATIENT
Start: 2020-09-01 | End: 2020-09-01

## 2020-09-01 RX ORDER — DEXTROSE, SODIUM CHLORIDE, AND POTASSIUM CHLORIDE 5; .45; .3 G/100ML; G/100ML; G/100ML
INJECTION INTRAVENOUS CONTINUOUS
Status: DISCONTINUED | OUTPATIENT
Start: 2020-09-01 | End: 2020-09-04

## 2020-09-01 RX ORDER — ACETAMINOPHEN 325 MG/1
650 TABLET ORAL
Status: DISCONTINUED | OUTPATIENT
Start: 2020-09-01 | End: 2020-09-01 | Stop reason: SDUPTHER

## 2020-09-01 RX ORDER — DEXTROSE 50 % IN WATER (D50W) INTRAVENOUS SYRINGE
Status: DISPENSED
Start: 2020-09-01 | End: 2020-09-02

## 2020-09-01 RX ORDER — POTASSIUM CHLORIDE AND SODIUM CHLORIDE 450; 150 MG/100ML; MG/100ML
INJECTION, SOLUTION INTRAVENOUS CONTINUOUS
Status: DISCONTINUED | OUTPATIENT
Start: 2020-09-01 | End: 2020-09-01

## 2020-09-01 RX ORDER — SODIUM CHLORIDE 0.9 % (FLUSH) 0.9 %
5-40 SYRINGE (ML) INJECTION AS NEEDED
Status: DISCONTINUED | OUTPATIENT
Start: 2020-09-01 | End: 2020-09-04 | Stop reason: HOSPADM

## 2020-09-01 RX ORDER — POTASSIUM CHLORIDE 7.45 MG/ML
10 INJECTION INTRAVENOUS
Status: COMPLETED | OUTPATIENT
Start: 2020-09-01 | End: 2020-09-01

## 2020-09-01 RX ORDER — DEXTROSE 50 % IN WATER (D50W) INTRAVENOUS SYRINGE
17
Status: ACTIVE | OUTPATIENT
Start: 2020-09-01 | End: 2020-09-02

## 2020-09-01 RX ORDER — POLYETHYLENE GLYCOL 3350 17 G/17G
17 POWDER, FOR SOLUTION ORAL DAILY PRN
Status: DISCONTINUED | OUTPATIENT
Start: 2020-09-01 | End: 2020-09-04 | Stop reason: HOSPADM

## 2020-09-01 RX ORDER — ONDANSETRON 2 MG/ML
4 INJECTION INTRAMUSCULAR; INTRAVENOUS
Status: DISCONTINUED | OUTPATIENT
Start: 2020-09-01 | End: 2020-09-04 | Stop reason: HOSPADM

## 2020-09-01 RX ORDER — LOSARTAN POTASSIUM AND HYDROCHLOROTHIAZIDE 25; 100 MG/1; MG/1
1 TABLET ORAL DAILY
COMMUNITY
End: 2020-12-24

## 2020-09-01 RX ORDER — AMLODIPINE BESYLATE 10 MG/1
10 TABLET ORAL DAILY
Status: ON HOLD | COMMUNITY
End: 2020-09-02 | Stop reason: SDUPTHER

## 2020-09-01 RX ORDER — PRAVASTATIN SODIUM 40 MG/1
80 TABLET ORAL
Status: DISCONTINUED | OUTPATIENT
Start: 2020-09-01 | End: 2020-09-04 | Stop reason: HOSPADM

## 2020-09-01 RX ORDER — BACITRACIN 500 UNIT/G
1 PACKET (EA) TOPICAL AS NEEDED
Status: DISCONTINUED | OUTPATIENT
Start: 2020-09-01 | End: 2020-09-04 | Stop reason: HOSPADM

## 2020-09-01 RX ORDER — PROMETHAZINE HYDROCHLORIDE 25 MG/1
12.5 TABLET ORAL
Status: DISCONTINUED | OUTPATIENT
Start: 2020-09-01 | End: 2020-09-04 | Stop reason: HOSPADM

## 2020-09-01 RX ORDER — ACETAMINOPHEN 650 MG/1
650 SUPPOSITORY RECTAL
Status: DISCONTINUED | OUTPATIENT
Start: 2020-09-01 | End: 2020-09-04 | Stop reason: HOSPADM

## 2020-09-01 RX ORDER — SODIUM CHLORIDE 0.9 % (FLUSH) 0.9 %
5-10 SYRINGE (ML) INJECTION AS NEEDED
Status: DISCONTINUED | OUTPATIENT
Start: 2020-09-01 | End: 2020-09-02 | Stop reason: SDUPTHER

## 2020-09-01 RX ORDER — SODIUM CHLORIDE 0.9 % (FLUSH) 0.9 %
5-40 SYRINGE (ML) INJECTION EVERY 8 HOURS
Status: DISCONTINUED | OUTPATIENT
Start: 2020-09-01 | End: 2020-09-04 | Stop reason: HOSPADM

## 2020-09-01 RX ORDER — METOPROLOL SUCCINATE 25 MG/1
25 TABLET, EXTENDED RELEASE ORAL DAILY
Status: DISCONTINUED | OUTPATIENT
Start: 2020-09-01 | End: 2020-09-04 | Stop reason: HOSPADM

## 2020-09-01 RX ORDER — ACETAMINOPHEN 325 MG/1
650 TABLET ORAL
Status: DISCONTINUED | OUTPATIENT
Start: 2020-09-01 | End: 2020-09-04 | Stop reason: HOSPADM

## 2020-09-01 RX ORDER — SODIUM BICARBONATE 1 MEQ/ML
50 SYRINGE (ML) INTRAVENOUS ONCE
Status: COMPLETED | OUTPATIENT
Start: 2020-09-01 | End: 2020-09-01

## 2020-09-01 RX ADMIN — POTASSIUM CHLORIDE AND SODIUM CHLORIDE: 450; 150 INJECTION, SOLUTION INTRAVENOUS at 14:14

## 2020-09-01 RX ADMIN — DEXTROSE MONOHYDRATE, SODIUM CHLORIDE, AND POTASSIUM CHLORIDE: 50; 4.5; 2.98 INJECTION, SOLUTION INTRAVENOUS at 17:50

## 2020-09-01 RX ADMIN — Medication 10 ML: at 15:31

## 2020-09-01 RX ADMIN — CALCIUM GLUCONATE 1 G: 98 INJECTION, SOLUTION INTRAVENOUS at 07:28

## 2020-09-01 RX ADMIN — SODIUM BICARBONATE 50 MEQ: 84 INJECTION, SOLUTION INTRAVENOUS at 07:27

## 2020-09-01 RX ADMIN — LEVOFLOXACIN 750 MG: 5 INJECTION, SOLUTION INTRAVENOUS at 08:15

## 2020-09-01 RX ADMIN — LEVOTHYROXINE SODIUM 175 MCG: 0.03 TABLET ORAL at 11:15

## 2020-09-01 RX ADMIN — SODIUM BICARBONATE: 84 INJECTION, SOLUTION INTRAVENOUS at 08:07

## 2020-09-01 RX ADMIN — SODIUM CHLORIDE 1000 ML: 900 INJECTION, SOLUTION INTRAVENOUS at 06:41

## 2020-09-01 RX ADMIN — SODIUM CHLORIDE 12 UNITS/HR: 9 INJECTION, SOLUTION INTRAVENOUS at 16:28

## 2020-09-01 RX ADMIN — PRAVASTATIN SODIUM 80 MG: 40 TABLET ORAL at 21:20

## 2020-09-01 RX ADMIN — POTASSIUM CHLORIDE 10 MEQ: 7.46 INJECTION, SOLUTION INTRAVENOUS at 15:27

## 2020-09-01 RX ADMIN — HUMAN INSULIN 10 UNITS: 100 INJECTION, SOLUTION SUBCUTANEOUS at 06:49

## 2020-09-01 RX ADMIN — CLOPIDOGREL BISULFATE 75 MG: 75 TABLET ORAL at 11:15

## 2020-09-01 RX ADMIN — SODIUM CHLORIDE 19.3 UNITS/HR: 9 INJECTION, SOLUTION INTRAVENOUS at 14:18

## 2020-09-01 RX ADMIN — POTASSIUM CHLORIDE 10 MEQ: 7.46 INJECTION, SOLUTION INTRAVENOUS at 14:14

## 2020-09-01 RX ADMIN — Medication 10 ML: at 21:21

## 2020-09-01 RX ADMIN — CEFEPIME HYDROCHLORIDE 2 G: 2 INJECTION, POWDER, FOR SOLUTION INTRAVENOUS at 07:30

## 2020-09-01 RX ADMIN — SODIUM CHLORIDE 10.8 UNITS/HR: 9 INJECTION, SOLUTION INTRAVENOUS at 08:21

## 2020-09-01 RX ADMIN — POTASSIUM CHLORIDE 10 MEQ: 7.46 INJECTION, SOLUTION INTRAVENOUS at 13:07

## 2020-09-01 RX ADMIN — POTASSIUM CHLORIDE 10 MEQ: 7.46 INJECTION, SOLUTION INTRAVENOUS at 16:28

## 2020-09-01 RX ADMIN — METOPROLOL SUCCINATE 25 MG: 25 TABLET, EXTENDED RELEASE ORAL at 17:50

## 2020-09-01 NOTE — PROGRESS NOTES
1700- assumed care of pt this afternoon from ED. Pt lactic has fallen from 4.9 to 2.2, MD paged. Jonathan- MD aware.

## 2020-09-01 NOTE — ED PROVIDER NOTES
EMERGENCY DEPARTMENT HISTORY AND PHYSICAL EXAM      Date: 9/1/2020  Patient Name: Arnaud Ho    History of Presenting Illness     Chief Complaint   Patient presents with    High Blood Sugar      patient brought in from home by ems, reported to have high blood sugar which could not be read by the monitor. per EMS vitals were stable. patient appears to be hyperventilating. per EMS this is usual for her when her blood surgar gets too high. History Provided By: Patient and EMS    HPI: Arnaud Ho, 67 y.o. female presents to the ED with history of recent hospital admission from August 25, 2022 August 29, 2020 with history of type Ib diabetes whose last hospitalization was related to diabetic ketoacidosis, dehydration, dementia at baseline, hypertension hypothyroidism. She improved with glucose management and hydration here and had a negative infectious work-up. There was initial concern for septic shock as well as acute kidney injury at that time. Today patient presents with blood glucose reading high per EMS with concern about an elevated respiratory rate. Met the patient who is awake alert and asked that she was having any pain for which she replied no. She denies any chest pain or chest discomfort, shortness of breath, cough, abdominal pain, nausea, vomiting or diarrhea. She denies any blood in her stools. She denies any recent falls. He says she lives with her son. She was transitioned to a DNR status during this past hospitalization. There are no other complaints, changes, or physical findings at this time. PCP: Oretha Seip, MD    No current facility-administered medications on file prior to encounter. Current Outpatient Medications on File Prior to Encounter   Medication Sig Dispense Refill    insulin degludec Reina Rein FlexTouch U-100) 100 unit/mL (3 mL) inpn 15 Units by SubCUTAneous route daily.  2 Adjustable Dose Pre-filled Pen Syringe 11    L. acidoph & paracasei- S therm- Bifido (GALILEA-Q/RISAQUAD) 8 billion cell cap cap Take 1 Cap by mouth daily. 20 Cap 0    furosemide (LASIX) 20 mg tablet One tablet daily 6 Tab 0    potassium chloride (KLOR-CON) 10 mEq tablet Take 1 Tab by mouth daily. 6 Tab 0    insulin lispro (HUMALOG) 100 unit/mL kwikpen 4 units before breakfast and lunch, 3 units before dinner 2 Adjustable Dose Pre-filled Pen Syringe 11    Insulin Needles, Disposable, (BD Ultra-Fine Short Pen Needle) 31 gauge x 5/16\" ndle USE AS DIRECTED TWICE A DAY. 1 Package 11    metoprolol succinate (TOPROL-XL) 25 mg XL tablet TAKE 1 TABLET BY MOUTH EVERY DAY 90 Tab 3    pravastatin (PRAVACHOL) 80 mg tablet TAKE 1 TABLET BY MOUTH at bedtime 90 Tab 3    levothyroxine (SYNTHROID) 175 mcg tablet TAKE 1 TABLET BY MOUTH EVERY DAY 90 Tab 3    clopidogreL (PLAVIX) 75 mg tab TAKE 1 TABLET BY MOUTH EVERY DAY 90 Tab 3    amLODIPine (NORVASC) 10 mg tablet TAKE 1 TABLET BY MOUTH EVERY DAY IN THE MORNING 90 Tab 3    flash glucose scanning reader (FREESTYLE KEVON 14 DAY READER) [facility] [place] As directed1 1 Each 5    flash glucose sensor (FREESTYLE KEVON 14 DAY SENSOR) kit Continuous monitoring 2 Kit 11    glucose blood VI test strips (ONETOUCH ULTRA BLUE TEST STRIP) strip USE TO CHECK BLOOD SUGARS DAILY. Dx: E11.649 100 Strip 11    Blood-Glucose Meter (ONETOUCH ULTRA2 METER) misc USE TO CHECK BLOOD SUGARS DAILY. 1 Each 0    brimonidine (ALPHAGAN) 0.15 % ophthalmic solution Administer 1 Drop to both eyes two (2) times a day.          Past History     Past Medical History:  Past Medical History:   Diagnosis Date    Heart failure ([address]     unknown to family    Hypertension     Stroke [facility])        Past Surgical History:  Past Surgical History:   Procedure Laterality Date    HX GYN      HX HEENT      thyroidectomy    HX HYSTERECTOMY      REMOVAL GALLBLADDER      THYROIDECTOMY         Family History:  Family History   Problem Relation Age of Onset    Diabetes Father     No Known Problems Mother Social History:  Social History     Tobacco Use    Smoking status: Never Smoker    Smokeless tobacco: Never Used   Substance Use Topics    Alcohol use: No     Comment: been years    Drug use: No       Allergies:  No Known Allergies      Review of Systems   Review of Systems   Constitutional: Negative for activity change, appetite change, chills, fatigue and fever. HENT: Positive for sore throat. Negative for congestion. Respiratory: Negative for chest tightness and shortness of breath. Cardiovascular: Negative for chest pain and leg swelling. Gastrointestinal: Negative for abdominal pain, diarrhea and vomiting. Genitourinary: Negative for difficulty urinating and urgency. Musculoskeletal: Negative for neck pain. Neurological: Negative for dizziness and light-headedness. Hematological: Negative for adenopathy. All other systems reviewed and are negative. Physical Exam   Physical Exam   Vital signs and nursing notes reviewed    CONSTITUTIONAL: Alert, in moderate distress; well-developed; well-nourished. Appears stated age, increased work of breathing noted. HEAD:  Normocephalic, atraumatic  EYES: PERRL; EOM's intact. Nonicteric sclera. ENTM: Nose: no rhinorrhea; Throat: no erythema or exudate, mucous membranes dry and lips dry. Neck:  Supple. trachea is midline. Fresh dressing on the right neck without active bleeding. RESP: Chest clear, equal breath sounds. - W/R/R, respiratory rate equals 30. CV: S1 and S2 WNL; No murmurs, gallops or rubs. 1+ radial and DP pulses bilaterally. Heart rate 104 and regular  GI: non-distended, normal bowel sounds, abdomen soft and non-tender. No masses or organomegaly. : No costo-vertebral angle tenderness. BACK:  Non-tender, normal appearance  UPPER EXT:  Normal inspection. no joint or soft tissue swelling  LOWER EXT: No edema, no calf tenderness.   NEURO: Alert and oriented x3, 5/5 strength and light touch sensation intact in bilateral upper and lower extremities. SKIN: No rashes; Warm and dry  PSYCH: Normal mood, normal affect      Diagnostic Study Results     Labs -     Recent Results (from the past 12 hour(s))   EKG, 12 LEAD, INITIAL    Collection Time: 09/01/20  6:03 AM   Result Value Ref Range    Ventricular Rate 104 BPM    Atrial Rate 104 BPM    P-R Interval 154 ms    QRS Duration 92 ms    Q-T Interval 354 ms    QTC Calculation (Bezet) 465 ms    Calculated P Axis 37 degrees    Calculated R Axis -51 degrees    Calculated T Axis 23 degrees    Diagnosis       Sinus tachycardia  Possible Left atrial enlargement  Left axis deviation  Anterior infarct , age undetermined  When compared with ECG of 25-AUG-2020 14:20,  No significant change was found     GLUCOSE, POC    Collection Time: 09/01/20  6:05 AM   Result Value Ref Range    Glucose (POC) >600 (HH) 65 - 100 mg/dL    Performed by Leti Ren RN    POC EG7    Collection Time: 09/01/20  6:33 AM   Result Value Ref Range    Calcium, ionized (POC) 1.16 1.12 - 1.32 mmol/L    pH (POC) 7.06 (LL) 7.35 - 7.45      pCO2 (POC) <15.0 (L) 35.0 - 45.0 MMHG    pO2 (POC) 124 (H) 80 - 100 MMHG    Site LEFT RADIAL      Device: ROOM AIR      Allens test (POC) YES      Specimen type (POC) ARTERIAL      Total resp.  rate 30     CBC WITH AUTOMATED DIFF    Collection Time: 09/01/20  6:38 AM   Result Value Ref Range    WBC 15.8 (H) 3.6 - 11.0 K/uL    RBC 3.36 (L) 3.80 - 5.20 M/uL    HGB 10.7 (L) 11.5 - 16.0 g/dL    HCT 33.3 (L) 35.0 - 47.0 %    MCV 99.1 (H) 80.0 - 99.0 FL    MCH 31.8 26.0 - 34.0 PG    MCHC 32.1 30.0 - 36.5 g/dL    RDW 14.8 (H) 11.5 - 14.5 %    PLATELET 769 239 - 157 K/uL    MPV 10.5 8.9 - 12.9 FL    NRBC 0.1 (H) 0  WBC    ABSOLUTE NRBC 0.02 (H) 0.00 - 0.01 K/uL    NEUTROPHILS 68 32 - 75 %    BAND NEUTROPHILS 7 %    LYMPHOCYTES 7 (L) 12 - 49 %    MONOCYTES 7 5 - 13 %    EOSINOPHILS 0 0 - 7 %    BASOPHILS 0 0 - 1 %    METAMYELOCYTES 7 %    MYELOCYTES 4 %    IMMATURE GRANULOCYTES 0 0.0 - 0.5 % ABS. NEUTROPHILS 11.9 (H) 1.8 - 8.0 K/UL    ABS. LYMPHOCYTES 1.1 0.8 - 3.5 K/UL    ABS. MONOCYTES 1.1 (H) 0.0 - 1.0 K/UL    ABS. EOSINOPHILS 0.0 0.0 - 0.4 K/UL    ABS. BASOPHILS 0.0 0.0 - 0.1 K/UL    ABS. IMM. GRANS. 0.0 0.00 - 0.04 K/UL    DF MANUAL      RBC COMMENTS NORMOCYTIC, NORMOCHROMIC      WBC COMMENTS VACUOLATED POLYS     METABOLIC PANEL, COMPREHENSIVE    Collection Time: 09/01/20  6:38 AM   Result Value Ref Range    Sodium 127 (L) 136 - 145 mmol/L    Potassium 6.0 (H) 3.5 - 5.1 mmol/L    Chloride 91 (L) 97 - 108 mmol/L    CO2 <5 (LL) 21 - 32 mmol/L    Anion gap Cannot be calculated 5 - 15 mmol/L    Glucose 891 (HH) 65 - 100 mg/dL    BUN 29 (H) 6 - 20 MG/DL    Creatinine 1.58 (H) 0.55 - 1.02 MG/DL    BUN/Creatinine ratio 18 12 - 20      GFR est AA 38 (L) >60 ml/min/1.73m2    GFR est non-AA 32 (L) >60 ml/min/1.73m2    Calcium 8.5 8.5 - 10.1 MG/DL    Bilirubin, total 0.5 0.2 - 1.0 MG/DL    ALT (SGPT) 30 12 - 78 U/L    AST (SGOT) 23 15 - 37 U/L    Alk.  phosphatase 151 (H) 45 - 117 U/L    Protein, total 6.3 (L) 6.4 - 8.2 g/dL    Albumin 3.3 (L) 3.5 - 5.0 g/dL    Globulin 3.0 2.0 - 4.0 g/dL    A-G Ratio 1.1 1.1 - 2.2     MAGNESIUM    Collection Time: 09/01/20  6:38 AM   Result Value Ref Range    Magnesium 2.4 1.6 - 2.4 mg/dL   TROPONIN I    Collection Time: 09/01/20  6:38 AM   Result Value Ref Range    Troponin-I, Qt. <0.05 <0.05 ng/mL   POC CHEM8    Collection Time: 09/01/20  6:52 AM   Result Value Ref Range    Calcium, ionized (POC) 1.11 (L) 1.12 - 1.32 mmol/L    Sodium (POC) 125 (L) 136 - 145 mmol/L    Potassium (POC) 6.0 (H) 3.5 - 5.1 mmol/L    Chloride (POC) 97 (L) 98 - 107 mmol/L    Glucose (POC) >700 (HH) 65 - 100 mg/dL    BUN (POC) 25 (H) 9 - 20 mg/dL    Creatinine (POC) 1.1 0.6 - 1.3 mg/dL    GFRAA, POC 58 (L) >60 ml/min/1.73m2    GFRNA, POC 48 (L) >60 ml/min/1.73m2    Hematocrit (POC) 33 (L) 35.0 - 47.0 %    Comment Notified RN or MD immediately by      POC LACTIC ACID    Collection Time: 09/01/20  7:00 AM   Result Value Ref Range    Lactic Acid (POC) 7.17 (HH) 0.40 - 2.00 mmol/L   URINALYSIS W/MICROSCOPIC    Collection Time: 09/01/20  7:19 AM   Result Value Ref Range    Color YELLOW/STRAW      Appearance CLOUDY (A) CLEAR      Specific gravity 1.022 1.003 - 1.030      pH (UA) 5.0 5.0 - 8.0      Protein 30 (A) NEG mg/dL    Glucose >1,000 (A) NEG mg/dL    Ketone 15 (A) NEG mg/dL    Bilirubin Negative NEG      Blood TRACE (A) NEG      Urobilinogen 0.2 0.2 - 1.0 EU/dL    Nitrites Negative NEG      Leukocyte Esterase Negative NEG      WBC PENDING /hpf    RBC PENDING /hpf    Epithelial cells PENDING /lpf    Bacteria PENDING /hpf       Radiologic Studies -   XR CHEST PORT   Final Result   IMPRESSION:      No acute process on portable chest. Removal of right jugular catheter since last   week. CT Results  (Last 48 hours)    None        CXR Results  (Last 48 hours)               09/01/20 0705  XR CHEST PORT Final result    Impression:  IMPRESSION:       No acute process on portable chest. Removal of right jugular catheter since last   week. Narrative:  EXAM: XR CHEST PORT       INDICATION: Dementia, shortness of breath, hyperglycemia. COMPARISON: Portable chest on 8/25/2020 and 6/16/2020. TECHNIQUE: Semiupright portable chest AP view       FINDINGS: Cardiac monitoring wires overlie the thorax. The cardiomediastinal and   hilar contours are within normal limits. The pulmonary vasculature is within   normal limits. The lungs and pleural spaces are clear. Bones are osteopenic. Loose body in the   right shoulder axillary recess is unchanged. Medical Decision Making   I am the first provider for this patient. I reviewed the vital signs, available nursing notes, past medical history, past surgical history, family history and social history. Vital Signs-Reviewed the patient's vital signs.   Patient Vitals for the past 12 hrs:   Temp Pulse Resp BP SpO2 09/01/20 0752  100 (!) 31  100 %   09/01/20 0746  (!) 102 28 (!) 116/34 100 %   09/01/20 0744  (!) 103 30 (!) 112/34 100 %   09/01/20 0600 98.3 °F (36.8 °C) (!) 104 28 (!) 113/33 100 %   09/01/20 0551 98.3 °F (36.8 °C) (!) 106 30 (!) 126/34 100 %       EKG interpretation: (Preliminary)  EKG performed at 6:03 AM, as interpreted by me shows a sinus tachycardia at a rate of 104, left axis deviation, normal QRS interval without visible acute ischemic changes. Records Reviewed: Nursing Notes, Old Medical Records, Previous electrocardiograms and Ambulance Run Sheet    Provider Notes (Medical Decision Making):   45-year-old female representing after only 3 days after recent hospitalization again with significant hyperglycemia, tachypnea, tachycardia with early activation of sepsis bundle. Patient has baseline dementia but relatively good historian and review of systems which does not initially reveal a infection source. Certainly her tachypnea and tachycardia could be related to hyperglycemia or DKA with subsequent dehydration and Kussmaul respiration response. She is afebrile here, no signs of trauma. We will plan for chest x-ray to evaluate for infiltrate though not hypoxic. Check ABG. Plan for fluid resuscitation and insulin administration given glucose is greater than 600 on initial point-of-care testing. Plan for 2 IVs for volume resuscitation. Given DNR status and brittle diabetes, patient will need palliative care consult as do have concern the patient is having a significant decline. Her urinalysis was not concerning for infection last time and a covid test on 8/25 was negative. At presentation, I am not convinced that this is sepsis and may be all changes related to metabolic derangement. ED Course:   Initial assessment performed. The patients presenting problems have been discussed, and they are in agreement with the care plan formulated and outlined with them.   I have encouraged them to ask questions as they arise throughout their visit. ED Course as of Sep 01 0803   Tue Sep 01, 2020   0639 Tech team just completed ultrasound-guided IV access in the right [facility]. Discussed with nurse getting point-of-care potassium so we can start insulin. IV fluids ordered. Patient remains awake alert. ABG concerning for DKA. [TL]   [id number] Wound care potassium 6. Nurse getting weight for the chart. IV fluids infusing. Bicarb ordered. [TL]      ED Course User Index  [TL] Calin Salazar MD     7:25 AM  Spoke with Dr. Olivia Almeida regarding admission. Critical Care Time:   CRITICAL CARE NOTE :    7214 AM    IMPENDING DETERIORATION -Respiratory, Cardiovascular and Metabolic  ASSOCIATED RISK FACTORS - Shock, Metabolic changes, Dehydration and CNS Decompensation  MANAGEMENT- Bedside Assessment and Supervision of Care  INTERPRETATION -  Xrays, Blood Gases, ECG, Blood Pressure and Cardiac Output Measures   INTERVENTIONS - hemodynamic mngmt and Metobolic interventions  CASE REVIEW - Hospitalist and Nursing  TREATMENT RESPONSE -Stable  PERFORMED BY - Self    NOTES   :  I have spent 45 minutes of critical care time involved in lab review, consultations with specialist, family decision- making, bedside attention and documentation. This time excludes time spent in any separate billed procedures. During this entire length of time I was immediately available to the patient . Bhavana Islas MD      Disposition:  Admit    Admit Note:  7:25 AM  Pt is being admitted by Dr. Olivia Almeida. The results of their tests and reason(s) for their admission have been discussed with pt and/or available family. They convey agreement and understanding for the need to be admitted and for admission diagnosis. Diagnosis     Clinical Impression:   1. Type 1 diabetes mellitus with ketoacidosis without coma ([facility] [address]    2. SIRS (systemic inflammatory response syndrome) (HCC)    3.  DNR no code (do not resuscitate)        Attestations:    Renate Boothe Yancy Smith MD    Please note that this dictation was completed with [employer], the computer voice recognition software. Quite often unanticipated grammatical, syntax, homophones, and other interpretive errors are inadvertently transcribed by the computer software. Please disregard these errors. Please excuse any errors that have escaped final proofreading. Thank you.

## 2020-09-01 NOTE — ED NOTES
Per Dr Varinder Jolley, no need for berry as patient has begun to output urine. He wants to be made aware of CMP results.

## 2020-09-01 NOTE — CONSULTS
Ms. Antwan Angel is a 67-year-old -American female with a long term type 1 diabetes mellitus with multiple admissions to [facility] for ketoacidosis and lactic acidosis. She was most recently hospitalized August 24-29 with a typical presentation of ketoacidosis which responded to traditional therapy. She was discharged on Tresiba insulin 50 units and Humalog insulin 4 units with each meal.  She now returns. With a blood sugar greater than 600, anion gap of 22, lactate 4.9 and creatinine 1.46. She is now glucose stabilizer. Per her history, she claims some adherence to her insulin although when you ask how much insulin she is taking she is very confused about the dosing. 1 suspects that she is not taking it. She claims 2 meals a day. A breakfast meal of oatmeal and a dinner meal of chicken and potatoes. She tells us that she lives with her son and this is well-documented in past visits. The son also has special needs. Examination  This is a thin -American female alert and oriented x1  Blood pressure is 121/39  Pulse 83  Respirations 20  Weight 65.2 kg  HEENT unremarkable  Examination of her injection sites reveals some areas of lipohypertrophy particularly in the abdomen. Examination of the feet reveals edema but without tissue breakdown. Impression type Ib diabetes mellitus with a typical admission for ketoacidosis/lactic acidosis likely related to medication nonadherence. Plan: We will continue the glucose stabilizer and transition to basal bolus insulin as has been done in the past.  The major issue has been and continues to be transition to a more suitable facility for long-term management. We will continue to follow with you.

## 2020-09-01 NOTE — ED NOTES
Paged Dr Kashmir Shelby to request berry catheter insertion. Pt has also had one liter NS in addition to maintenance fluids. She is scheduled for another liter and has hx of CHF and exhibits pitting edema in BLE.

## 2020-09-01 NOTE — REMOTE MONITORING
Spoke with primary RN regarding sepsis bundle. Call back number [phone], thank you!   Signed By: Johana Vázquez RN     September 1, 2020      [address] [facility]

## 2020-09-01 NOTE — ED NOTES
Report given to Dwarf, RN. They were informed of patient chief complaint, current status, orders completed (to include IV access/medications/radiology testing), outstanding orders that still need to be completed, and the treatment plan. Ensured no questions or concerns regarding the patient prior to departure.

## 2020-09-01 NOTE — H&P
Hospitalist Admission Note    NAME: Marysol Ocampo   :  1940   MRN:  [id number]     Date/Time:  2020 2:24 PM    Patient PCP: Estuardo Shipman MD  ______________________________________________________________________  Given the patient's current clinical presentation, I have a high level of concern for decompensation if discharged from the emergency department. Complex decision making was performed, which includes reviewing the patient's available past medical records, laboratory results, and x-ray films. My assessment of this patient's clinical condition and my plan of care is as follows. Assessment / Plan:  DKA  ONEYDA  Hyperkalemia  Dehydration  Hypothyroidism  Pseudohyponatremia  Lactic acidosis  Severe dementia  Admit to PCU  Rule out infection - pt given broad spectrum abx. Have not restarted this as no evidence of infection noted. Leukocytosis could be from hyperglycemia. Continue to monitor. Pt's DKA is likely the result of medication noncompliance  Blood gas noted - significant acidosis - expect this to improve with IV fluids and insulin  Pt was initially on bicarb drip ordered by the ER, repeat HCO3 improved - will discontinue drip  AG trending towards normal but still elevated  K 6.0 - given calcium gluconate, now down to 3.3  Replete K with IVF  Continue IV Insulin  DKA protocol to be followed  Monitor FS q1hr  If FS drops below 250, switch from 0.45% NS to D5 1/2NS  Repeat Chem every 4 hours  Repeat LA  Continue home meds    Code Status: DNR  Surrogate Decision Maker:  DVT Prophylaxis: Lovenox  GI Prophylaxis: not indicated  Baseline: Likely needs long term placement      Subjective:   CHIEF COMPLAINT: BIBEMS for high BS    HISTORY OF PRESENT ILLNESS:     Kacey Denis is a 67 y.o.  female who presents with CC listed above. Of note, pt was previously hospitalized at the end of August with a similar presentation of DKA and dehydration.  Today, she was noted to have an erratic breathing pattern, and found to have an elevated blood glucose that did not register on the device. Due to her history of dementia, it is difficult to get an accurate history from her. She lives with her son. We were asked to admit for work up and evaluation of the above problems. Past Medical History:   Diagnosis Date    Heart failure ([address]     unknown to family    Hypertension     Stroke [facility])         Past Surgical History:   Procedure Laterality Date    HX GYN      HX HEENT      thyroidectomy    HX HYSTERECTOMY      REMOVAL GALLBLADDER      THYROIDECTOMY         Social History     Tobacco Use    Smoking status: Never Smoker    Smokeless tobacco: Never Used   Substance Use Topics    Alcohol use: No     Comment: been years        Family History   Problem Relation Age of Onset    Diabetes Father     No Known Problems Mother      No Known Allergies     Prior to Admission medications    Medication Sig Start Date End Date Taking? Authorizing Provider   losartan-hydroCHLOROthiazide (HYZAAR) 100-25 mg per tablet Take 1 Tab by mouth daily. Yes Provider, Historical   insulin degludec Cable Frohlich FlexTouch U-100) 100 unit/mL (3 mL) inpn 15 Units by SubCUTAneous route daily. 8/29/20  Yes Adilene Brink MD   L. acidoph & paracasei- S therm- Bifido (GALILEA-Q/RISAQUAD) 8 billion cell cap cap Take 1 Cap by mouth daily. 8/30/20  Yes Adilene Brink MD   furosemide (LASIX) 20 mg tablet One tablet daily 8/29/20  Yes Adilene Brink MD   potassium chloride (KLOR-CON) 10 mEq tablet Take 1 Tab by mouth daily.  8/29/20  Yes Adilene Brink MD   insulin lispro (HUMALOG) 100 unit/mL kwikpen 4 units before breakfast and lunch, 3 units before dinner 8/29/20  Yes Adilene Brink MD   metoprolol succinate (TOPROL-XL) 25 mg XL tablet TAKE 1 TABLET BY MOUTH EVERY DAY 5/11/20  Yes Adilene Brink MD   pravastatin (PRAVACHOL) 80 mg tablet TAKE 1 TABLET BY MOUTH at bedtime 2/27/20  Yes Edin Bryant Christos Ribeiro MD   levothyroxine (SYNTHROID) 175 mcg tablet TAKE 1 TABLET BY MOUTH EVERY DAY 2/27/20  Yes Nehemiah Mccarty MD   clopidogreL (PLAVIX) 75 mg tab TAKE 1 TABLET BY MOUTH EVERY DAY 2/27/20  Yes Nehemiah Mccarty MD   brimonidine (ALPHAGAN) 0.15 % ophthalmic solution Administer 1 Drop to both eyes two (2) times a day. 1/30/15  Yes Provider, Historical   amLODIPine (NORVASC) 10 mg tablet Take 10 mg by mouth daily. Provider, Historical   flash glucose sensor (FREESTYLE KEVON 14 DAY SENSOR) kit Continuous monitoring 2/27/20   Nehemiah Mccarty MD       REVIEW OF SYSTEMS:     I am not able to complete the review of systems because:    The patient is intubated and sedated    The patient has altered mental status due to his acute medical problems    The patient has baseline aphasia from prior stroke(s)   x The patient has baseline dementia and is not reliable historian    The patient is in acute medical distress and unable to provide information           Total of 12 systems reviewed as follows:       POSITIVE= underlined text  Negative = text not underlined  General:  fever, chills, sweats, generalized weakness, weight loss/gain,      loss of appetite   Eyes:    blurred vision, eye pain, loss of vision, double vision  ENT:    rhinorrhea, pharyngitis   Respiratory:   cough, sputum production, SOB, AGUILERA, wheezing, pleuritic pain   Cardiology:   chest pain, palpitations, orthopnea, PND, edema, syncope   Gastrointestinal:  abdominal pain , N/V, diarrhea, dysphagia, constipation, bleeding   Genitourinary:  frequency, urgency, dysuria, hematuria, incontinence   Muskuloskeletal :  arthralgia, myalgia, back pain  Hematology:  easy bruising, nose or gum bleeding, lymphadenopathy   Dermatological: rash, ulceration, pruritis, color change / jaundice  Endocrine:   hot flashes or polydipsia   Neurological:  headache, dizziness, confusion, focal weakness, paresthesia,     Speech difficulties, memory loss, gait difficulty  Psychological: Feelings of anxiety, depression, agitation    Objective:   VITALS:    Visit Vitals  BP (!) 121/39   Pulse 83   Temp 98.3 °F (36.8 °C)   Resp 20   Ht 5' 3\" (1.6 m)   Wt 143 lb 11.8 oz (65.2 kg)   SpO2 100%   BMI 25.46 kg/m²       PHYSICAL EXAM:    General:    Drowsy, arousable  HEENT: Atraumatic, anicteric sclerae, pink conjunctivae     No oral ulcers, mucosa moist, throat clear, dentition fair  Neck:  Supple, symmetrical,  thyroid: non tender  Lungs:   Clear to auscultation bilaterally. No Wheezing or Rhonchi. No rales. Chest wall:  No tenderness  No Accessory muscle use. Heart:   Regular  rhythm,  No  murmur   No edema  Abdomen:   Soft, non-tender. Not distended. Bowel sounds normal  Extremities: No cyanosis. No clubbing,      Skin turgor normal, Capillary refill normal, Radial dial pulse 2+  Skin:     Not pale. Not Jaundiced  No rashes   Psych:  Poor insight  Neurologic: EOMs intact. No facial asymmetry. Speech appears normal. Pt is confused. _______________________________________________________________________  Care Plan discussed with:    Comments   Patient x    Family      RN x    Care Manager                    Consultant:      _______________________________________________________________________  Expected  Disposition:   Home with Family    HH/PT/OT/RN    SNF/LTC x   TAYA    ________________________________________________________________________  TOTAL TIME:  54 Minutes    Critical Care Provided     Minutes non procedure based      Comments    x Reviewed previous records   >50% of visit spent in counseling and coordination of care  Discussion with patient and/or family and questions answered       ________________________________________________________________________  Signed: Ana Lilia Lyles MD    Procedures: see electronic medical records for all procedures/Xrays and details which were not copied into this note but were reviewed prior to creation of Plan.     LAB DATA REVIEWED:    Recent Results (from the past 24 hour(s))   EKG, 12 LEAD, INITIAL    Collection Time: 09/01/20  6:03 AM   Result Value Ref Range    Ventricular Rate 104 BPM    Atrial Rate 104 BPM    P-R Interval 154 ms    QRS Duration 92 ms    Q-T Interval 354 ms    QTC Calculation (Bezet) 465 ms    Calculated P Axis 37 degrees    Calculated R Axis -51 degrees    Calculated T Axis 23 degrees    Diagnosis       Sinus tachycardia  Possible Left atrial enlargement  Left axis deviation  Anterior infarct , age undetermined  When compared with ECG of 25-AUG-2020 14:20,  No significant change was found  Confirmed by Eliseo King M.D. ([id number]) on 9/1/2020 11:49:16 AM     GLUCOSE, POC    Collection Time: 09/01/20  6:05 AM   Result Value Ref Range    Glucose (POC) >600 (HH) 65 - 100 mg/dL    Performed by Cordell Guerra RN    POC EG7    Collection Time: 09/01/20  6:33 AM   Result Value Ref Range    Calcium, ionized (POC) 1.16 1.12 - 1.32 mmol/L    pH (POC) 7.06 (LL) 7.35 - 7.45      pCO2 (POC) <15.0 (L) 35.0 - 45.0 MMHG    pO2 (POC) 124 (H) 80 - 100 MMHG    Site LEFT RADIAL      Device: ROOM AIR      Allens test (POC) YES      Specimen type (POC) ARTERIAL      Total resp. rate 30     CBC WITH AUTOMATED DIFF    Collection Time: 09/01/20  6:38 AM   Result Value Ref Range    WBC 15.8 (H) 3.6 - 11.0 K/uL    RBC 3.36 (L) 3.80 - 5.20 M/uL    HGB 10.7 (L) 11.5 - 16.0 g/dL    HCT 33.3 (L) 35.0 - 47.0 %    MCV 99.1 (H) 80.0 - 99.0 FL    MCH 31.8 26.0 - 34.0 PG    MCHC 32.1 30.0 - 36.5 g/dL    RDW 14.8 (H) 11.5 - 14.5 %    PLATELET 270 667 - 254 K/uL    MPV 10.5 8.9 - 12.9 FL    NRBC 0.1 (H) 0  WBC    ABSOLUTE NRBC 0.02 (H) 0.00 - 0.01 K/uL    NEUTROPHILS 68 32 - 75 %    BAND NEUTROPHILS 7 %    LYMPHOCYTES 7 (L) 12 - 49 %    MONOCYTES 7 5 - 13 %    EOSINOPHILS 0 0 - 7 %    BASOPHILS 0 0 - 1 %    METAMYELOCYTES 7 %    MYELOCYTES 4 %    IMMATURE GRANULOCYTES 0 0.0 - 0.5 %    ABS. NEUTROPHILS 11.9 (H) 1.8 - 8.0 K/UL    ABS.  LYMPHOCYTES 1.1 0.8 - 3.5 K/UL    ABS. MONOCYTES 1.1 (H) 0.0 - 1.0 K/UL    ABS. EOSINOPHILS 0.0 0.0 - 0.4 K/UL    ABS. BASOPHILS 0.0 0.0 - 0.1 K/UL    ABS. IMM. GRANS. 0.0 0.00 - 0.04 K/UL    DF MANUAL      RBC COMMENTS NORMOCYTIC, NORMOCHROMIC      WBC COMMENTS VACUOLATED POLYS     METABOLIC PANEL, COMPREHENSIVE    Collection Time: 09/01/20  6:38 AM   Result Value Ref Range    Sodium 127 (L) 136 - 145 mmol/L    Potassium 6.0 (H) 3.5 - 5.1 mmol/L    Chloride 91 (L) 97 - 108 mmol/L    CO2 <5 (LL) 21 - 32 mmol/L    Anion gap Cannot be calculated 5 - 15 mmol/L    Glucose 891 (HH) 65 - 100 mg/dL    BUN 29 (H) 6 - 20 MG/DL    Creatinine 1.58 (H) 0.55 - 1.02 MG/DL    BUN/Creatinine ratio 18 12 - 20      GFR est AA 38 (L) >60 ml/min/1.73m2    GFR est non-AA 32 (L) >60 ml/min/1.73m2    Calcium 8.5 8.5 - 10.1 MG/DL    Bilirubin, total 0.5 0.2 - 1.0 MG/DL    ALT (SGPT) 30 12 - 78 U/L    AST (SGOT) 23 15 - 37 U/L    Alk. phosphatase 151 (H) 45 - 117 U/L    Protein, total 6.3 (L) 6.4 - 8.2 g/dL    Albumin 3.3 (L) 3.5 - 5.0 g/dL    Globulin 3.0 2.0 - 4.0 g/dL    A-G Ratio 1.1 1.1 - 2.2     CULTURE, BLOOD    Collection Time: 09/01/20  6:38 AM    Specimen: Blood   Result Value Ref Range    Special Requests: NO SPECIAL REQUESTS      Culture result:        ONE OF TWO BOTTLES HAS BEEN FLAGGED POSITIVE BY INSTRUMENT. BOTTLE HAS BEEN SENT TO [facility] TO ASSESS FOR POSSIBLE GROWTH.     Culture result: REMAINING BOTTLE(S) HAS/HAVE NO GROWTH SO FAR     MAGNESIUM    Collection Time: 09/01/20  6:38 AM   Result Value Ref Range    Magnesium 2.4 1.6 - 2.4 mg/dL   TROPONIN I    Collection Time: 09/01/20  6:38 AM   Result Value Ref Range    Troponin-I, Qt. <0.05 <0.05 ng/mL   POC CHEM8    Collection Time: 09/01/20  6:52 AM   Result Value Ref Range    Calcium, ionized (POC) 1.11 (L) 1.12 - 1.32 mmol/L    Sodium (POC) 125 (L) 136 - 145 mmol/L    Potassium (POC) 6.0 (H) 3.5 - 5.1 mmol/L    Chloride (POC) 97 (L) 98 - 107 mmol/L    Glucose (POC) >700 (HH) 65 - 100 mg/dL    BUN (POC) 25 (H) 9 - 20 mg/dL    Creatinine (POC) 1.1 0.6 - 1.3 mg/dL    GFRAA, POC 58 (L) >60 ml/min/1.73m2    GFRNA, POC 48 (L) >60 ml/min/1.73m2    Hematocrit (POC) 33 (L) 35.0 - 47.0 %    Comment Notified RN or MD immediately by      POC LACTIC ACID    Collection Time: 09/01/20  7:00 AM   Result Value Ref Range    Lactic Acid (POC) 7.17 (HH) 0.40 - 2.00 mmol/L   URINALYSIS W/MICROSCOPIC    Collection Time: 09/01/20  7:19 AM   Result Value Ref Range    Color YELLOW/STRAW      Appearance CLOUDY (A) CLEAR      Specific gravity 1.022 1.003 - 1.030      pH (UA) 5.0 5.0 - 8.0      Protein 30 (A) NEG mg/dL    Glucose >1,000 (A) NEG mg/dL    Ketone 15 (A) NEG mg/dL    Bilirubin Negative NEG      Blood TRACE (A) NEG      Urobilinogen 0.2 0.2 - 1.0 EU/dL    Nitrites Negative NEG      Leukocyte Esterase Negative NEG      WBC 10-20 0 - 4 /hpf    RBC 5-10 0 - 5 /hpf    Epithelial cells FEW FEW /lpf    Bacteria Negative NEG /hpf    Mucus TRACE (A) NEG /lpf    Hyaline cast 0-2 0 - 5 /lpf    Budding yeast PRESENT (A) NEG     GLUCOSE, POC    Collection Time: 09/01/20  8:09 AM   Result Value Ref Range    Glucose (POC) >600 (HH) 65 - 100 mg/dL    Performed by Jillian Mcdonough RN (traveler)    GLUCOSE, POC    Collection Time: 09/01/20  8:11 AM   Result Value Ref Range    Glucose (POC) >600 (HH) 65 - 100 mg/dL    Performed by Jillian Mcdonough RN (traveler)    GLUCOSE, POC    Collection Time: 09/01/20  9:26 AM   Result Value Ref Range    Glucose (POC) >600 (HH) 65 - 100 mg/dL    Performed by Radames Cuevas (EDT)    GLUCOSE, POC    Collection Time: 09/01/20  9:27 AM   Result Value Ref Range    Glucose (POC) >600 (HH) 65 - 100 mg/dL    Performed by Radames Cuevas (EDT)    GLUCOSE, POC    Collection Time: 09/01/20 10:42 AM   Result Value Ref Range    Glucose (POC) 557 (H) 65 - 100 mg/dL    Performed by Radames IGNACIO)    METABOLIC PANEL, BASIC    Collection Time: 09/01/20 11:33 AM   Result Value Ref Range    Sodium 135 (L) 136 - 145 mmol/L    Potassium 3.3 (L) 3.5 - 5.1 mmol/L    Chloride 100 97 - 108 mmol/L    CO2 13 (LL) 21 - 32 mmol/L    Anion gap 22 (H) 5 - 15 mmol/L    Glucose 536 (H) 65 - 100 mg/dL    BUN 28 (H) 6 - 20 MG/DL    Creatinine 1.46 (H) 0.55 - 1.02 MG/DL    BUN/Creatinine ratio 19 12 - 20      GFR est AA 42 (L) >60 ml/min/1.73m2    GFR est non-AA 35 (L) >60 ml/min/1.73m2    Calcium 8.0 (L) 8.5 - 10.1 MG/DL   LACTIC ACID    Collection Time: 09/01/20 11:33 AM   Result Value Ref Range    Lactic acid 4.9 (HH) 0.4 - 2.0 MMOL/L   GLUCOSE, POC    Collection Time: 09/01/20 11:55 AM   Result Value Ref Range    Glucose (POC) 484 (H) 65 - 100 mg/dL    Performed by [facility] V[facility]    GLUCOSE, POC    Collection Time: 09/01/20  1:05 PM   Result Value Ref Range    Glucose (POC) 422 (H) 65 - 100 mg/dL    Performed by [facility] VIN    GLUCOSE, POC    Collection Time: 09/01/20  2:17 PM   Result Value Ref Range    Glucose (POC) 336 (H) 65 - 100 mg/dL    Performed by [employer] VIN

## 2020-09-01 NOTE — REMOTE MONITORING
Spoke with Jonathan Ayala for primary RNs regarding sepsis bundle. Call back number [phone], thank you! Signed By: Mj Walls RN     September 1, 2020      [address] [facility].

## 2020-09-01 NOTE — PROGRESS NOTES
Pharmacy Automatic Renal Dosing Protocol - Antimicrobials    Indication for Antimicrobials: Sepsis     Current Regimen of Each Antimicrobial:  Cefepime 2g IV q8h  Levaquin 750mg IV q24h   (Start Date ; Day # 1)    Significant Cultures:    Blood x 2 sets- in process    Radiology / Imaging results: (X-ray, CT scan or MRI):    CXR: IMPRESSION:     No acute process on portable chest. Removal of right jugular catheter since last  week. Paralysis, amputations, malnutrition:      Labs:  Recent Labs     20  0638   CREA 1.58*   BUN 29*   WBC 15.8*     Temp (24hrs), Av.3 °F (36.8 °C), Min:98.3 °F (36.8 °C), Max:98.3 °F (36.8 °C)      Is the Patient on Dialysis? No    Creatinine Clearance (mL/min):   CrCl (Actual Body Weight): 27.7  CrCl (Adjusted Body Weight): 25.6  CrCl (Ideal Body Weight): 24.3    Impression/Plan:   Due to renal impairment will reduce Cefepime dose to 2g IV q24h and will reduce Levofloxacin dose to 750mg IV q48h  Antimicrobial stop date TBD     Pharmacy will follow daily and adjust medications as appropriate for renal function and/or serum levels. Thank you,  Filomena Jesus    Recommended duration of therapy  [email or web address]/[facility]/Pharmacy/Clinical%20Companion/Duration%20of%20ABX%20therapy. docx    Renal Dosing  [email or web address]/[facility]/Pharmacy/Clinical%20Companion/Renal%[id number]. pdf

## 2020-09-01 NOTE — PROGRESS NOTES
PICC Education: Explained reason and rationale for PICC placement along with providing education in order to make an informed consent including nature, risks, benefits, potential complications, care and maintenance of PICC line. The opportunity for questions or concerns was given. A 'Patient PICC Handbook was also provided. Patient's daughter,  Judy Mercedes, gave telephone consent, verified by 2 VAT Nurses, for PICC procedure to be done at the bedside. Mrs. Berrios verbalizes understanding at this time. Patient has dementia and confused at times and unable to sign consent. Procedure:  Time out completed. Pre procedure assessment done. Maximum sterile barrier precautions observed throughout procedure. Lidocaine 1% 3ml sc given prior to cannulation  Cannulated right basilic vein using ultrasound guidance and modified seldinger technique. Inserted double lumen 5 fr PICC in  right arm using, [employer] Tip Location System and 3CG   Patient has sinus rhythm. Tip Positioning System indicating tall P wave and no negative deflection before P wave which would indicate the PICC tip is properly placed in the distal SVC or the CAJ. PICC tip was confirmed by 2 PICC nurses and 3CG printout was placed on patients chart. Blood return verified and flushed with 20ml NS in each port. Sterile dressing applied with Biopatch, Stat loc, occlusive dressing per protocol. Curios caps applied to each port. Reason for access (limited vessel). Complications related to insertion (none). Patient tolerated procedure well with minimal blood loss. PICC procedure performed by: Asim Tsai, RN [facility] / Vascular Access Nurse  Assisted by: ÓSCAR Ross, RN [facility] / Vascular Access Nurse  Right  arm circumference: 31 cm. Catheter internal length:  37 cm  Catheter external length: 0 cm  PICC catheter occupies 17% of vein  Brand of catheter Santana Sandoval [place] PICC,  Lot #[id number]   REF# [id number]    EXP 2021-08-31.   Primary nurse Francisco Montero RN, notified PICC line may be used and to hang new infusion tubing prior to use.       Pretty Hunter RN [facility]   Vascular Access Team

## 2020-09-02 LAB
ANION GAP SERPL CALC-SCNC: 12 MMOL/L (ref 5–15)
ANION GAP SERPL CALC-SCNC: 8 MMOL/L (ref 5–15)
ANION GAP SERPL CALC-SCNC: 8 MMOL/L (ref 5–15)
BASOPHILS # BLD: 0 K/UL (ref 0–0.1)
BASOPHILS NFR BLD: 0 % (ref 0–1)
BLASTS NFR BLD MANUAL: 0 %
BUN SERPL-MCNC: 17 MG/DL (ref 6–20)
BUN SERPL-MCNC: 17 MG/DL (ref 6–20)
BUN SERPL-MCNC: 18 MG/DL (ref 6–20)
BUN/CREAT SERPL: 17 (ref 12–20)
BUN/CREAT SERPL: 20 (ref 12–20)
BUN/CREAT SERPL: 21 (ref 12–20)
CALCIUM SERPL-MCNC: 7.5 MG/DL (ref 8.5–10.1)
CALCIUM SERPL-MCNC: 7.7 MG/DL (ref 8.5–10.1)
CALCIUM SERPL-MCNC: 7.7 MG/DL (ref 8.5–10.1)
CHLORIDE SERPL-SCNC: 105 MMOL/L (ref 97–108)
CHLORIDE SERPL-SCNC: 107 MMOL/L (ref 97–108)
CHLORIDE SERPL-SCNC: 109 MMOL/L (ref 97–108)
CO2 SERPL-SCNC: 19 MMOL/L (ref 21–32)
CO2 SERPL-SCNC: 23 MMOL/L (ref 21–32)
CO2 SERPL-SCNC: 24 MMOL/L (ref 21–32)
CREAT SERPL-MCNC: 0.8 MG/DL (ref 0.55–1.02)
CREAT SERPL-MCNC: 0.88 MG/DL (ref 0.55–1.02)
CREAT SERPL-MCNC: 0.99 MG/DL (ref 0.55–1.02)
DIFFERENTIAL METHOD BLD: ABNORMAL
EOSINOPHIL # BLD: 0 K/UL (ref 0–0.4)
EOSINOPHIL NFR BLD: 0 % (ref 0–7)
ERYTHROCYTE [DISTWIDTH] IN BLOOD BY AUTOMATED COUNT: 13.9 % (ref 11.5–14.5)
GLUCOSE BLD STRIP.AUTO-MCNC: 106 MG/DL (ref 65–100)
GLUCOSE BLD STRIP.AUTO-MCNC: 113 MG/DL (ref 65–100)
GLUCOSE BLD STRIP.AUTO-MCNC: 117 MG/DL (ref 65–100)
GLUCOSE BLD STRIP.AUTO-MCNC: 133 MG/DL (ref 65–100)
GLUCOSE BLD STRIP.AUTO-MCNC: 133 MG/DL (ref 65–100)
GLUCOSE BLD STRIP.AUTO-MCNC: 137 MG/DL (ref 65–100)
GLUCOSE BLD STRIP.AUTO-MCNC: 204 MG/DL (ref 65–100)
GLUCOSE BLD STRIP.AUTO-MCNC: 204 MG/DL (ref 65–100)
GLUCOSE BLD STRIP.AUTO-MCNC: 206 MG/DL (ref 65–100)
GLUCOSE BLD STRIP.AUTO-MCNC: 248 MG/DL (ref 65–100)
GLUCOSE BLD STRIP.AUTO-MCNC: 248 MG/DL (ref 65–100)
GLUCOSE BLD STRIP.AUTO-MCNC: 265 MG/DL (ref 65–100)
GLUCOSE BLD STRIP.AUTO-MCNC: 265 MG/DL (ref 65–100)
GLUCOSE BLD STRIP.AUTO-MCNC: 298 MG/DL (ref 65–100)
GLUCOSE BLD STRIP.AUTO-MCNC: 300 MG/DL (ref 65–100)
GLUCOSE BLD STRIP.AUTO-MCNC: 334 MG/DL (ref 65–100)
GLUCOSE BLD STRIP.AUTO-MCNC: 61 MG/DL (ref 65–100)
GLUCOSE BLD STRIP.AUTO-MCNC: 74 MG/DL (ref 65–100)
GLUCOSE BLD STRIP.AUTO-MCNC: 87 MG/DL (ref 65–100)
GLUCOSE BLD STRIP.AUTO-MCNC: 94 MG/DL (ref 65–100)
GLUCOSE BLD STRIP.AUTO-MCNC: 99 MG/DL (ref 65–100)
GLUCOSE BLD STRIP.AUTO-MCNC: 99 MG/DL (ref 65–100)
GLUCOSE SERPL-MCNC: 109 MG/DL (ref 65–100)
GLUCOSE SERPL-MCNC: 120 MG/DL (ref 65–100)
GLUCOSE SERPL-MCNC: 348 MG/DL (ref 65–100)
HCT VFR BLD AUTO: 27.6 % (ref 35–47)
HGB BLD-MCNC: 9.4 G/DL (ref 11.5–16)
IMM GRANULOCYTES # BLD AUTO: 0 K/UL
IMM GRANULOCYTES NFR BLD AUTO: 0 %
LYMPHOCYTES # BLD: 1 K/UL (ref 0.8–3.5)
LYMPHOCYTES NFR BLD: 11 % (ref 12–49)
MCH RBC QN AUTO: 30.7 PG (ref 26–34)
MCHC RBC AUTO-ENTMCNC: 34.1 G/DL (ref 30–36.5)
MCV RBC AUTO: 90.2 FL (ref 80–99)
METAMYELOCYTES NFR BLD MANUAL: 0 %
MONOCYTES # BLD: 0.8 K/UL (ref 0–1)
MONOCYTES NFR BLD: 9 % (ref 5–13)
MYELOCYTES NFR BLD MANUAL: 0 %
NEUTS BAND NFR BLD MANUAL: 0 % (ref 0–6)
NEUTS SEG # BLD: 7.2 K/UL (ref 1.8–8)
NEUTS SEG NFR BLD: 80 % (ref 32–75)
NRBC # BLD: 0 K/UL (ref 0–0.01)
NRBC BLD-RTO: 0 PER 100 WBC
OTHER CELLS NFR BLD MANUAL: 0 %
PLATELET # BLD AUTO: 247 K/UL (ref 150–400)
PMV BLD AUTO: 9.8 FL (ref 8.9–12.9)
POTASSIUM SERPL-SCNC: 4.1 MMOL/L (ref 3.5–5.1)
POTASSIUM SERPL-SCNC: 4.3 MMOL/L (ref 3.5–5.1)
POTASSIUM SERPL-SCNC: 4.8 MMOL/L (ref 3.5–5.1)
PROMYELOCYTES NFR BLD MANUAL: 0 %
RBC # BLD AUTO: 3.06 M/UL (ref 3.8–5.2)
RBC MORPH BLD: ABNORMAL
SERVICE CMNT-IMP: ABNORMAL
SERVICE CMNT-IMP: NORMAL
SODIUM SERPL-SCNC: 136 MMOL/L (ref 136–145)
SODIUM SERPL-SCNC: 139 MMOL/L (ref 136–145)
SODIUM SERPL-SCNC: 140 MMOL/L (ref 136–145)
WBC # BLD AUTO: 9 K/UL (ref 3.6–11)

## 2020-09-02 PROCEDURE — 80048 BASIC METABOLIC PNL TOTAL CA: CPT

## 2020-09-02 PROCEDURE — 85027 COMPLETE CBC AUTOMATED: CPT

## 2020-09-02 PROCEDURE — 36415 COLL VENOUS BLD VENIPUNCTURE: CPT

## 2020-09-02 PROCEDURE — 74011636637 HC RX REV CODE- 636/637: Performed by: INTERNAL MEDICINE

## 2020-09-02 PROCEDURE — 65660000001 HC RM ICU INTERMED STEPDOWN

## 2020-09-02 PROCEDURE — 82962 GLUCOSE BLOOD TEST: CPT

## 2020-09-02 PROCEDURE — 74011250636 HC RX REV CODE- 250/636: Performed by: GENERAL ACUTE CARE HOSPITAL

## 2020-09-02 PROCEDURE — 74011250637 HC RX REV CODE- 250/637: Performed by: GENERAL ACUTE CARE HOSPITAL

## 2020-09-02 RX ORDER — AMLODIPINE BESYLATE 10 MG/1
10 TABLET ORAL DAILY
Qty: 30 TAB | Refills: 11 | Status: SHIPPED | OUTPATIENT
Start: 2020-09-02 | End: 2020-10-05 | Stop reason: SDUPTHER

## 2020-09-02 RX ORDER — DEXTROSE 50 % IN WATER (D50W) INTRAVENOUS SYRINGE
25-50 AS NEEDED
Status: DISCONTINUED | OUTPATIENT
Start: 2020-09-02 | End: 2020-09-04 | Stop reason: HOSPADM

## 2020-09-02 RX ORDER — MAGNESIUM SULFATE 100 %
4 CRYSTALS MISCELLANEOUS AS NEEDED
Status: DISCONTINUED | OUTPATIENT
Start: 2020-09-02 | End: 2020-09-04 | Stop reason: HOSPADM

## 2020-09-02 RX ORDER — INSULIN GLARGINE 100 [IU]/ML
20 INJECTION, SOLUTION SUBCUTANEOUS DAILY
Status: DISCONTINUED | OUTPATIENT
Start: 2020-09-02 | End: 2020-09-04 | Stop reason: HOSPADM

## 2020-09-02 RX ORDER — INSULIN LISPRO 100 [IU]/ML
4 INJECTION, SOLUTION INTRAVENOUS; SUBCUTANEOUS
Status: DISCONTINUED | OUTPATIENT
Start: 2020-09-02 | End: 2020-09-04 | Stop reason: HOSPADM

## 2020-09-02 RX ADMIN — Medication 10 ML: at 21:46

## 2020-09-02 RX ADMIN — INSULIN LISPRO 4 UNITS: 100 INJECTION, SOLUTION INTRAVENOUS; SUBCUTANEOUS at 17:27

## 2020-09-02 RX ADMIN — INSULIN GLARGINE 20 UNITS: 100 INJECTION, SOLUTION SUBCUTANEOUS at 10:38

## 2020-09-02 RX ADMIN — PRAVASTATIN SODIUM 80 MG: 40 TABLET ORAL at 21:45

## 2020-09-02 RX ADMIN — ACETAMINOPHEN 650 MG: 325 TABLET ORAL at 04:53

## 2020-09-02 RX ADMIN — DEXTROSE MONOHYDRATE, SODIUM CHLORIDE, AND POTASSIUM CHLORIDE: 50; 4.5; 2.98 INJECTION, SOLUTION INTRAVENOUS at 01:35

## 2020-09-02 RX ADMIN — CLOPIDOGREL BISULFATE 75 MG: 75 TABLET ORAL at 08:44

## 2020-09-02 RX ADMIN — DEXTROSE MONOHYDRATE, SODIUM CHLORIDE, AND POTASSIUM CHLORIDE: 50; 4.5; 2.98 INJECTION, SOLUTION INTRAVENOUS at 13:05

## 2020-09-02 RX ADMIN — ENOXAPARIN SODIUM 30 MG: 30 INJECTION SUBCUTANEOUS at 08:44

## 2020-09-02 RX ADMIN — METOPROLOL SUCCINATE 25 MG: 25 TABLET, EXTENDED RELEASE ORAL at 08:44

## 2020-09-02 RX ADMIN — LEVOTHYROXINE SODIUM 175 MCG: 0.03 TABLET ORAL at 08:44

## 2020-09-02 RX ADMIN — Medication 10 ML: at 08:44

## 2020-09-02 RX ADMIN — Medication 10 ML: at 01:36

## 2020-09-02 NOTE — PROGRESS NOTES
[address] END OF SHIFT REPORT      Bedside shift change report GIVEN TO Ahsan Johnson RN. Report included the following information SBAR, Kardex, Intake/Output, MAR and Recent Results. SIGNIFICANT CHANGES DURING SHIFT:        CONCERNS TO ADDRESS WITH MD:          [address] NURSING NOTE   Admission Date 9/1/2020   Admission Diagnosis DKA (diabetic ketoacidoses) ([facility] [address] [E11.10]   Consults IP CONSULT TO PRIMARY CARE PROVIDER  IP CONSULT TO HOSPITALIST      Cardiac Monitoring [x] Yes [] No      Purposeful Hourly Rounding [x] Yes    Tyler Score Total Score: 2   Tyler score 3 or > [x] Bed Alarm [] Avasys [] 1:1 sitter [] Patient refused (Signed refusal form in chart)   Madhu Score Madhu Score: 15   Madhu score 14 or < [] PMT consult [] Wound Care consult    []  Specialty bed  [] Nutrition consult      Influenza Vaccine             Oxygen needs? [x] Room air Oxygen @  []1L    []2L    []3L   []4L    []5L   []6L via  NC   Chronic home O2 use? [] Yes [x] No  Perform O2 challenge test and document in progress note using smartphallyvee (.Home[employer])      Last bowel movement Last Bowel Movement Date: 09/01/20      Urinary Catheter             LDAs         PICC Double Lumen 95/82/58 Right;Basilic (Active)   Central Line Being Utilized Yes 09/01/20 2009   Criteria for Appropriate Use Limited/no vessel suitable for conventional peripheral access 09/01/20 2009   Site Assessment Clean, dry, & intact 09/01/20 2009   Phlebitis Assessment 0 09/01/20 2009   Infiltration Assessment 0 09/01/20 2009   Arm Circumference (cm) 31 cm 09/01/20 1029   Date of Last Dressing Change 09/01/20 09/01/20 2009   Dressing Status New;Clean, dry, & intact 09/01/20 1029   External Catheter Length (cm) 0 centimeters 09/01/20 1029   Dressing Type Disk with Chlorhexadine gluconate (CHG); Stabilization/securement device;Transparent 09/01/20 1029   Hub Color/Line Status Purple;Flushed;Patent;Capped 09/01/20 1029   Positive Blood Return (Site #1) Yes 09/01/20 1029   Hub Color/Line Status Red;Flushed;Patent;Capped 09/01/20 1029   Positive Blood Return (Site #2) Yes 09/01/20 1029   Alcohol Cap Used Yes 09/01/20 1029          Peripheral IV 09/01/20 Right Antecubital (Active)   Site Assessment Clean, dry, & intact 09/01/20 2009   Phlebitis Assessment 0 09/01/20 2009   Infiltration Assessment 0 09/01/20 2009   Dressing Status Clean, dry, & intact 09/01/20 2009   Dressing Type Transparent 09/01/20 2009   Hub Color/Line Status Green;Flushed 09/01/20 2009       Peripheral IV 09/01/20 Left Antecubital (Active)   Site Assessment Clean, dry, & intact 09/01/20 2009   Phlebitis Assessment 0 09/01/20 2009   Infiltration Assessment 0 09/01/20 2009   Dressing Status Clean, dry, & intact 09/01/20 2009   Dressing Type Transparent 09/01/20 2009   Hub Color/Line Status Pink;Flushed 09/01/20 2009                         Readmission Risk Assessment Tool Score High Risk            27       Total Score        3 Has Seen PCP in Last 6 Months (Yes=3, No=0)    11 IP Visits Last 12 Months (1-3=4, 4=9, >4=11)    9 Pt. Coverage (Medicare=5 , Medicaid, or Self-Pay=4)    4 Charlson Comorbidity Score (Age + Comorbid Conditions)        Criteria that do not apply:    . Living with Significant Other. Assisted Living. LTAC. SNF.  or   Rehab    Patient Length of Stay (>5 days = 3)       Expected Length of Stay - - -   Actual Length of Stay 0

## 2020-09-02 NOTE — PROGRESS NOTES
General Daily Progress Note    Admit Date: 9/1/2020    Subjective:     Patient has no complaint . Yvonne Jassi Current Facility-Administered Medications   Medication Dose Route Frequency    glucose chewable tablet 16 g  4 Tab Oral PRN    dextrose (D50W) injection syrg 12.5-25 g  25-50 mL IntraVENous PRN    glucagon (GLUCAGEN) injection 1 mg  1 mg IntraMUSCular PRN    insulin glargine (LANTUS) injection 20 Units  20 Units SubCUTAneous DAILY    sodium chloride (NS) flush 5-10 mL  5-10 mL IntraVENous PRN    insulin regular (NOVOLIN R, HUMULIN R) 100 Units in 0.9% sodium chloride 100 mL infusion  0-50 Units/hr IntraVENous TITRATE    clopidogreL (PLAVIX) tablet 75 mg  75 mg Oral DAILY    pravastatin (PRAVACHOL) tablet 80 mg  80 mg Oral QHS    levothyroxine (SYNTHROID) tablet 175 mcg  175 mcg Oral ACB    metoprolol succinate (TOPROL-XL) XL tablet 25 mg  25 mg Oral DAILY    bacitracin 500 unit/gram packet 1 Packet  1 Packet Topical PRN    sodium chloride (NS) flush 5-40 mL  5-40 mL IntraVENous Q8H    sodium chloride (NS) flush 5-40 mL  5-40 mL IntraVENous PRN    acetaminophen (TYLENOL) tablet 650 mg  650 mg Oral Q6H PRN    Or    acetaminophen (TYLENOL) suppository 650 mg  650 mg Rectal Q6H PRN    polyethylene glycol (MIRALAX) packet 17 g  17 g Oral DAILY PRN    promethazine (PHENERGAN) tablet 12.5 mg  12.5 mg Oral Q6H PRN    Or    ondansetron (ZOFRAN) injection 4 mg  4 mg IntraVENous Q6H PRN    enoxaparin (LOVENOX) injection 30 mg  30 mg SubCUTAneous DAILY    dextrose 5% - 0.45% NaCl with KCl 40 mEq/L infusion   IntraVENous CONTINUOUS        Review of Systems  A comprehensive review of systems was negative.     Objective:     Patient Vitals for the past 24 hrs:   BP Temp Pulse Resp SpO2   09/02/20 0826 116/44 99.5 °F (37.5 °C) 86 18 99 %   09/02/20 0323 120/43 98 °F (36.7 °C) 82 18 100 %   09/01/20 2332 118/48 98.9 °F (37.2 °C) 84 18 98 %   09/01/20 2009 105/43 97.8 °F (36.6 °C) 79 18 100 %   09/01/20 6375 121/52 97.8 °F (36.6 °C) 85 15 99 %   09/01/20 1615 121/42 98 °F (36.7 °C) 84 15 100 %   09/01/20 1530 129/43  87 18 99 %   09/01/20 1445 123/44  86 18 98 %   09/01/20 1145 (!) 121/39  83 20 100 %   09/01/20 1100 (!) 115/37  84 23 100 %   09/01/20 0945 (!) 109/36  88 19 100 %   09/01/20 0845 (!) 109/36  91 25 100 %     No intake/output data recorded. 08/31 1901 - 09/02 0700  In: 2548.7 [I.V.:2548.7]  Out: -     Physical Exam:   Visit Vitals  /44   Pulse 86   Temp 99.5 °F (37.5 °C)   Resp 18   Ht 5' 3\" (1.6 m)   Wt 143 lb 11.8 oz (65.2 kg)   SpO2 99%   BMI 25.46 kg/m²     General appearance: alert, cooperative, no distress, appears stated age  Neck: supple, symmetrical, trachea midline, no adenopathy, thyroid: not enlarged, symmetric, no tenderness/mass/nodules, no carotid bruit and no JVD  Lungs: clear to auscultation bilaterally  Heart: regular rate and rhythm, S1, S2 normal, no murmur, click, rub or gallop  Abdomen: soft, non-tender. Bowel sounds normal. No masses,  no organomegaly  Extremities: extremities normal, atraumatic, no cyanosis or edema    Assessment:     Active Problems:    DKA (diabetic ketoacidoses) ([facility] [address] (6/2/2017)        Plan:     1. Patient to be transition to basal and bolus insulin. DKA resolving. 2.  Whenever patient is discharged please continue [place] and do not transition to Lantus. Her discharge dose to be 20 units every morning accompanied by her insulin analog to 4 units AC. She does not need furosemide or KCl. All other medications are as she has been taking.

## 2020-09-02 NOTE — ROUTINE PROCESS
Updated Dr. Maris Alcantar that pt's BMP shows her anion gap: 8 and CO2: 23. Pt's blood glucose readings since 0700 also discussed. Order received to give pt 20 units of Lantus now and stop insulin gtt in two hours.

## 2020-09-02 NOTE — WOUND CARE
Wound care nurse consult: consult from staff nurse stating \"gluteal cleft\". Patient is a 66 y/o AAF admitted for DKA. Past Medical History:  
Diagnosis Date  Heart failure ([facility] [address]   
 unknown to family  Hypertension  Stroke ([facility] [address] Past Surgical History:  
Procedure Laterality Date  HX GYN    
 HX HEENT    
 thyroidectomy  HX HYSTERECTOMY  REMOVAL GALLBLADDER    
 THYROIDECTOMY Patient able to turn herself in bed with minimal assist. She is incontinent of urine with a Purwick in place. 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 Recommend; 
 
Continue use of Purwick and incontinence chuxs with patient Gluteal cleft fissure: TID, cleanse skin gently with soap and water, pat dry and apply ES Desitin zinc cream as dressing.  
 
Karen Parker RN, [employer]

## 2020-09-02 NOTE — CONSULTS
Ms. Antwan Angel is a 52-year-old -American female with a long term type 1 diabetes mellitus with multiple admissions to [facility] for ketoacidosis and lactic acidosis. She was most recently hospitalized August 24-29 with a typical presentation of ketoacidosis which responded to traditional therapy. She was discharged on Tresiba insulin 50 units and Humalog insulin 4 units with each meal.  She now returns. With a blood sugar greater than 600, anion gap of 22, lactate 4.9 and creatinine 1.46. She is now off glucose stabilizer and received 20 units of Lantus insulin this afternoon. Her blood sugars have improved nicely with a recent value of 94. She says that she is now able to eat and feels well. Examination  Blood pressure 119/48  Pulse 80  Temperature 98.5    Most recent electrolytes reveal a anion gap of 8, glucose 120, creatinine 0.99. Impression type 1 diabetes mellitus with a resolution of diabetic ketoacidosis    Plan: We will continue basal bolus insulin. As noted previously, attempts to identify an alternative to her current home situation which leads to frequent missing doses of insulin should be high priority.

## 2020-09-03 LAB
ANION GAP SERPL CALC-SCNC: 6 MMOL/L (ref 5–15)
BUN SERPL-MCNC: 10 MG/DL (ref 6–20)
BUN/CREAT SERPL: 14 (ref 12–20)
CALCIUM SERPL-MCNC: 7.5 MG/DL (ref 8.5–10.1)
CHLORIDE SERPL-SCNC: 108 MMOL/L (ref 97–108)
CO2 SERPL-SCNC: 24 MMOL/L (ref 21–32)
CREAT SERPL-MCNC: 0.7 MG/DL (ref 0.55–1.02)
GLUCOSE BLD STRIP.AUTO-MCNC: 110 MG/DL (ref 65–100)
GLUCOSE BLD STRIP.AUTO-MCNC: 122 MG/DL (ref 65–100)
GLUCOSE BLD STRIP.AUTO-MCNC: 189 MG/DL (ref 65–100)
GLUCOSE BLD STRIP.AUTO-MCNC: 64 MG/DL (ref 65–100)
GLUCOSE BLD STRIP.AUTO-MCNC: 78 MG/DL (ref 65–100)
GLUCOSE SERPL-MCNC: 114 MG/DL (ref 65–100)
POTASSIUM SERPL-SCNC: 4 MMOL/L (ref 3.5–5.1)
SERVICE CMNT-IMP: ABNORMAL
SERVICE CMNT-IMP: NORMAL
SODIUM SERPL-SCNC: 138 MMOL/L (ref 136–145)

## 2020-09-03 PROCEDURE — 74011636637 HC RX REV CODE- 636/637: Performed by: INTERNAL MEDICINE

## 2020-09-03 PROCEDURE — 74011250636 HC RX REV CODE- 250/636: Performed by: GENERAL ACUTE CARE HOSPITAL

## 2020-09-03 PROCEDURE — 82962 GLUCOSE BLOOD TEST: CPT

## 2020-09-03 PROCEDURE — 65660000001 HC RM ICU INTERMED STEPDOWN

## 2020-09-03 PROCEDURE — 80048 BASIC METABOLIC PNL TOTAL CA: CPT

## 2020-09-03 PROCEDURE — 74011250637 HC RX REV CODE- 250/637: Performed by: INTERNAL MEDICINE

## 2020-09-03 PROCEDURE — 74011250637 HC RX REV CODE- 250/637: Performed by: GENERAL ACUTE CARE HOSPITAL

## 2020-09-03 PROCEDURE — 36415 COLL VENOUS BLD VENIPUNCTURE: CPT

## 2020-09-03 RX ADMIN — Medication: at 03:49

## 2020-09-03 RX ADMIN — INSULIN GLARGINE 20 UNITS: 100 INJECTION, SOLUTION SUBCUTANEOUS at 09:18

## 2020-09-03 RX ADMIN — INSULIN LISPRO 4 UNITS: 100 INJECTION, SOLUTION INTRAVENOUS; SUBCUTANEOUS at 11:30

## 2020-09-03 RX ADMIN — DEXTROSE MONOHYDRATE, SODIUM CHLORIDE, AND POTASSIUM CHLORIDE: 50; 4.5; 2.98 INJECTION, SOLUTION INTRAVENOUS at 02:00

## 2020-09-03 RX ADMIN — INSULIN LISPRO 4 UNITS: 100 INJECTION, SOLUTION INTRAVENOUS; SUBCUTANEOUS at 09:18

## 2020-09-03 RX ADMIN — LEVOTHYROXINE SODIUM 175 MCG: 0.03 TABLET ORAL at 09:18

## 2020-09-03 RX ADMIN — CLOPIDOGREL BISULFATE 75 MG: 75 TABLET ORAL at 09:19

## 2020-09-03 RX ADMIN — DEXTROSE MONOHYDRATE, SODIUM CHLORIDE, AND POTASSIUM CHLORIDE: 50; 4.5; 2.98 INJECTION, SOLUTION INTRAVENOUS at 23:13

## 2020-09-03 RX ADMIN — ENOXAPARIN SODIUM 30 MG: 30 INJECTION SUBCUTANEOUS at 09:18

## 2020-09-03 RX ADMIN — INSULIN LISPRO 4 UNITS: 100 INJECTION, SOLUTION INTRAVENOUS; SUBCUTANEOUS at 17:38

## 2020-09-03 RX ADMIN — Medication: at 21:27

## 2020-09-03 RX ADMIN — DEXTROSE MONOHYDRATE, SODIUM CHLORIDE, AND POTASSIUM CHLORIDE: 50; 4.5; 2.98 INJECTION, SOLUTION INTRAVENOUS at 11:16

## 2020-09-03 RX ADMIN — Medication 10 ML: at 21:26

## 2020-09-03 RX ADMIN — Medication: at 09:00

## 2020-09-03 RX ADMIN — PRAVASTATIN SODIUM 80 MG: 40 TABLET ORAL at 21:27

## 2020-09-03 RX ADMIN — Medication 10 ML: at 05:16

## 2020-09-03 RX ADMIN — Medication: at 18:00

## 2020-09-03 RX ADMIN — METOPROLOL SUCCINATE 25 MG: 25 TABLET, EXTENDED RELEASE ORAL at 09:18

## 2020-09-03 NOTE — PROGRESS NOTES
Problem: Falls - Risk of  Goal: *Absence of Falls  Description: Document Ken Zuñiga Fall Risk and appropriate interventions in the flowsheet.   Outcome: Progressing Towards Goal  Note: Fall Risk Interventions:  Mobility Interventions: Bed/chair exit alarm, Communicate number of staff needed for ambulation/transfer, OT consult for ADLs, Patient to call before getting OOB, PT Consult for mobility concerns, PT Consult for assist device competence, Utilize walker, cane, or other assistive device, Utilize gait belt for transfers/ambulation    Mentation Interventions: Adequate sleep, hydration, pain control, Bed/chair exit alarm, Door open when patient unattended, Evaluate medications/consider consulting pharmacy, Increase mobility, More frequent rounding, Reorient patient, Room close to nurse's station, Self-releasing belt, Toileting rounds, Update white board    Medication Interventions: Bed/chair exit alarm, Evaluate medications/consider consulting pharmacy, Patient to call before getting OOB, Teach patient to arise slowly, Utilize gait belt for transfers/ambulation    Elimination Interventions: Call light in reach, Elevated toilet seat, Bed/chair exit alarm, Patient to call for help with toileting needs, Stay With Me (per policy), Toilet paper/wipes in reach, Toileting schedule/hourly rounds

## 2020-09-03 NOTE — PROGRESS NOTES
Chart reviewed. Pt is a readmit well known to me. The pt is pleasantly confused. I'm unable to reach the dtr. Will follow up tomorrow.

## 2020-09-03 NOTE — CONSULTS
Ms. Elinor Bella is a 73-year-old -American female with a long term type 1 diabetes mellitus with multiple admissions to [facility] for ketoacidosis and lactic acidosis. Phoebe Alonso was most recently hospitalized August 24-29 with a typical presentation of ketoacidosis which responded to traditional therapy.  She was discharged on Tresiba insulin 50 units and Humalog insulin 4 units with each meal.  She now returns.  With a blood sugar greater than 600, anion gap of 22, lactate 4.9 and creatinine 1.46.       She is now off glucose stabilizer and received 20 units of Lantus insulin this afternoon. Her blood sugars have improved nicely with a recent value of 94. She says that she is now able to eat and feels well. Blood sugar this morning 114 indicating the 20 units of Lantus is an ideal basal insulin for her. I am concerned about mealtime insulin. We do note the request to transition to [facility] on discharge which is totally appropriate.

## 2020-09-04 ENCOUNTER — HOME HEALTH ADMISSION (OUTPATIENT)
Dept: HOME HEALTH SERVICES | Facility: HOME HEALTH | Age: 80
End: 2020-09-04
Payer: MEDICARE

## 2020-09-04 ENCOUNTER — PATIENT OUTREACH (OUTPATIENT)
Dept: CASE MANAGEMENT | Age: 80
End: 2020-09-04

## 2020-09-04 VITALS
WEIGHT: 143.74 LBS | DIASTOLIC BLOOD PRESSURE: 83 MMHG | TEMPERATURE: 97.9 F | OXYGEN SATURATION: 97 % | HEART RATE: 81 BPM | HEIGHT: 63 IN | RESPIRATION RATE: 18 BRPM | BODY MASS INDEX: 25.47 KG/M2 | SYSTOLIC BLOOD PRESSURE: 116 MMHG

## 2020-09-04 LAB
ANION GAP SERPL CALC-SCNC: 1 MMOL/L (ref 5–15)
BUN SERPL-MCNC: 6 MG/DL (ref 6–20)
BUN/CREAT SERPL: 11 (ref 12–20)
CALCIUM SERPL-MCNC: 7.5 MG/DL (ref 8.5–10.1)
CHLORIDE SERPL-SCNC: 109 MMOL/L (ref 97–108)
CO2 SERPL-SCNC: 28 MMOL/L (ref 21–32)
CREAT SERPL-MCNC: 0.57 MG/DL (ref 0.55–1.02)
GLUCOSE BLD STRIP.AUTO-MCNC: 115 MG/DL (ref 65–100)
GLUCOSE BLD STRIP.AUTO-MCNC: 142 MG/DL (ref 65–100)
GLUCOSE BLD STRIP.AUTO-MCNC: 169 MG/DL (ref 65–100)
GLUCOSE SERPL-MCNC: 88 MG/DL (ref 65–100)
POTASSIUM SERPL-SCNC: 4.2 MMOL/L (ref 3.5–5.1)
SERVICE CMNT-IMP: ABNORMAL
SODIUM SERPL-SCNC: 138 MMOL/L (ref 136–145)

## 2020-09-04 PROCEDURE — 74011250636 HC RX REV CODE- 250/636: Performed by: GENERAL ACUTE CARE HOSPITAL

## 2020-09-04 PROCEDURE — 80048 BASIC METABOLIC PNL TOTAL CA: CPT

## 2020-09-04 PROCEDURE — 74011250637 HC RX REV CODE- 250/637: Performed by: GENERAL ACUTE CARE HOSPITAL

## 2020-09-04 PROCEDURE — 82962 GLUCOSE BLOOD TEST: CPT

## 2020-09-04 PROCEDURE — 74011636637 HC RX REV CODE- 636/637: Performed by: INTERNAL MEDICINE

## 2020-09-04 PROCEDURE — 36415 COLL VENOUS BLD VENIPUNCTURE: CPT

## 2020-09-04 RX ADMIN — ACETAMINOPHEN 650 MG: 325 TABLET ORAL at 12:37

## 2020-09-04 RX ADMIN — METOPROLOL SUCCINATE 25 MG: 25 TABLET, EXTENDED RELEASE ORAL at 08:44

## 2020-09-04 RX ADMIN — Medication 10 ML: at 05:28

## 2020-09-04 RX ADMIN — INSULIN GLARGINE 20 UNITS: 100 INJECTION, SOLUTION SUBCUTANEOUS at 08:40

## 2020-09-04 RX ADMIN — INSULIN LISPRO 4 UNITS: 100 INJECTION, SOLUTION INTRAVENOUS; SUBCUTANEOUS at 12:31

## 2020-09-04 RX ADMIN — LEVOTHYROXINE SODIUM 175 MCG: 0.03 TABLET ORAL at 08:44

## 2020-09-04 RX ADMIN — CLOPIDOGREL BISULFATE 75 MG: 75 TABLET ORAL at 08:44

## 2020-09-04 RX ADMIN — Medication: at 08:47

## 2020-09-04 RX ADMIN — INSULIN LISPRO 4 UNITS: 100 INJECTION, SOLUTION INTRAVENOUS; SUBCUTANEOUS at 08:41

## 2020-09-04 RX ADMIN — ENOXAPARIN SODIUM 30 MG: 30 INJECTION SUBCUTANEOUS at 08:39

## 2020-09-04 NOTE — PROGRESS NOTES
3:30  I spoke with dtr who says her spouse is still on the vent with covid Positive. She is doing much better. A couple is on their way to  pt. They should be here ~4p. Pt's family is overwhelmed with Covid in their family. They truly doing the best they can for the pt under the circumstances. She is not interested in LTC//Personal Care at this time. 57  Dtr is arranging for transport later this afternoon by family. She is in agreement with d/c. Orders for nursing, PT, HHA, and SW sent to [facility] who will follow. FOC on chart    Reason for Readmission:              RUR Score/Risk Level:     43    PCP: First and Last name:  Rudi Gil   Name of Practice: [facility]   Are you a current patient: Yes/No: yes   Approximate date of last visit: July   Can you participate in a virtual visit with your PCP: no    Is a Care Conference indicated:   Done in IDR daily      Did you attend your follow up appointment (s): If not, why not: yes         Resources/supports as identified by patient/family:   Has 2 sons//dtr that help       Top Challenges facing patient (as identified by patient/family and CM): Finances/Medication cost?     Needs Personal Care or LTC  Transportation      family  Support system or lack thereof? Apparently non compliant with insulin administration  Living arrangements? Lives with disabled son and son  Self-care/ADLs/Cognition? Advanced dementia       Current Advanced Directive/Advance Care Plan:  Was completed by Palliative Care in July           Plan for utilizing home health:   ? [facility]             Transition of Care Plan:    Based on readmission, the patient's previous Plan of Care   has been evaluated and/or modified.  The current Transition of Care Plan is:      -I spoke with her and she is pleasently confused  -last admission, she had consults for dietary, DTC, and Palliative Care.  -the children were given a list of options that they wanted to follow up on  -son Raghavendra Allen was supposed to move in and help with compliance. -Dr Lyssa Palomares has discharged her today.  -will ask [facility] if she is open to them and contact children to provide transportation    Care Management Interventions  PCP Verified by CM:  Yes  Transition of Care Consult (CM Consult): Discharge Planning  MyChart Signup: No  Discharge Durable Medical Equipment: No  Physical Therapy Consult: No  Occupational Therapy Consult: No  Speech Therapy Consult: No  Current Support Network: Family Lives Nearby, Lives with Caregiver  Confirm Follow Up Transport: Family  Discharge Location  Discharge Placement: Home

## 2020-09-04 NOTE — DISCHARGE SUMMARY
Hospitalist Discharge Summary     Patient ID:  Reji Curtis  [id number]  61 y.o.  1940 9/1/2020    PCP on record: Apryl Dubose MD    Admit date: 9/1/2020  Discharge date and time: 9/4/2020    DISCHARGE DIAGNOSIS:  Acute DKA likely secondary to noncompliance. Dementia  Diabetes mellitus type 2 with hyperglycemia  Hyperlipidemia  Hypothyroidism  Pseudohyponatremia secondary to hyperglycemia. Severe dementia. CONSULTATIONS:  IP CONSULT TO PRIMARY CARE PROVIDER  IP CONSULT TO HOSPITALIST    Excerpted HPI from H&P of Lynne Garcia MD:  Raul Carpenter is a 67 y.o.  female who presents with CC listed above. Of note, pt was previously hospitalized at the end of August with a similar presentation of DKA and dehydration. Today, she was noted to have an erratic breathing pattern, and found to have an elevated blood glucose that did not register on the device. Due to her history of dementia, it is difficult to get an accurate history from her. She lives with her son.    ______________________________________________________________________  DISCHARGE SUMMARY/HOSPITAL COURSE:  for full details see H&P, daily progress notes, labs, consult notes. This is a patient known to Dr. Manish Izaguirre who was admitted as talking by the hospitalist group with hyperglycemia DKA hypothyroidism dehydration patient was admitted to PCU did [address] Patient was put on DKA protocol continue outpatient medications IV fluids. Patient also had a PICC line placed for accessibility patient with underlying severe dementia patient took the PICC line out. Clinical patient has been doing very well patient is demented patient is awake and alert sitting up in the chair patient lives at home with family. Patient is being discharged home will continue outpatient medications. We will follow-up with Dr. Manish Izaguirre in the office. In 1 week.   Patient will go with home health care services as well.          _______________________________________________________________________  Patient seen and examined by me on discharge day. Pertinent Findings:  Gen:    Not in distress  Chest: Clear lungs  CVS:   Regular rhythm. No edema  Abd:  Soft, not distended, not tender  Neuro:  Alert   _______________________________________________________________________  DISCHARGE MEDICATIONS:   Current Discharge Medication List      CONTINUE these medications which have NOT CHANGED    Details   losartan-hydroCHLOROthiazide (HYZAAR) 100-25 mg per tablet Take 1 Tab by mouth daily. insulin degludec Dary Clunes FlexTouch U-100) 100 unit/mL (3 mL) inpn 15 Units by SubCUTAneous route daily. Qty: 2 Adjustable Dose Pre-filled Pen Syringe, Refills: 11      L. acidoph & paracasei- S therm- Bifido (GALILEA-Q/RISAQUAD) 8 billion cell cap cap Take 1 Cap by mouth daily. Qty: 20 Cap, Refills: 0      furosemide (LASIX) 20 mg tablet One tablet daily  Qty: 6 Tab, Refills: 0      insulin lispro (HUMALOG) 100 unit/mL kwikpen 4 units before breakfast and lunch, 3 units before dinner  Qty: 2 Adjustable Dose Pre-filled Pen Syringe, Refills: 11      metoprolol succinate (TOPROL-XL) 25 mg XL tablet TAKE 1 TABLET BY MOUTH EVERY DAY  Qty: 90 Tab, Refills: 3    Associated Diagnoses: Essential hypertension      pravastatin (PRAVACHOL) 80 mg tablet TAKE 1 TABLET BY MOUTH at bedtime  Qty: 90 Tab, Refills: 3    Associated Diagnoses: Dyslipidemia      levothyroxine (SYNTHROID) 175 mcg tablet TAKE 1 TABLET BY MOUTH EVERY DAY  Qty: 90 Tab, Refills: 3    Associated Diagnoses: Hypothyroidism due to medication      clopidogreL (PLAVIX) 75 mg tab TAKE 1 TABLET BY MOUTH EVERY DAY  Qty: 90 Tab, Refills: 3    Associated Diagnoses: Essential hypertension      brimonidine (ALPHAGAN) 0.15 % ophthalmic solution Administer 1 Drop to both eyes two (2) times a day. amLODIPine (NORVASC) 10 mg tablet Take 1 Tab by mouth daily.   Qty: 30 Tab, Refills: 11      flash glucose sensor (FREESTYLE KEVON 14 DAY SENSOR) kit Continuous monitoring  Qty: 2 Kit, Refills: 11    Associated Diagnoses: Hyperglycemia due to type 1 diabetes mellitus (HCC)         STOP taking these medications       potassium chloride (KLOR-CON) 10 mEq tablet Comments:   Reason for Stopping:                 Patient Follow Up Instructions:    Activity: as tolerated   Diet:  Diabetic       Follow-up with  PCP 1 week     Follow-up Information     Follow up With Specialties Details Why Contact Rueprto Perez MD Internal Medicine   [address] [place] [phone]          ________________________________________________________________    Risk of deterioration: low    Condition at Discharge:  Stable  __________________________________________________________________    Disposition  home with family     ____________________________________________________________________    Code Status: full  ___________________________________________________________________      Total time in minutes spent coordinating this discharge (includes going over instructions, follow-up, prescriptions, and preparing report for sign off to her PCP) :35minutes    Signed:  Samantha Jiang MD

## 2020-09-04 NOTE — PROGRESS NOTES
[facility] team regarding PIV placement since pt pulled out [facility]/PIV this AM. Was informed she would be added to their list. Will continue to monitor

## 2020-09-04 NOTE — PROGRESS NOTES
Goals Addressed                 This Visit's Progress     Prevent complications post hospitalization. 8/31/2020 Patient reports she is taking insulin however did not check blood sugar this morning. Son reports he will schedule appointments for follow up. Patient will check blood sugar, record number and report at provider appointment and at next outreach. MYNOR    9/4/2020 CTN received notification patient readmitted to hospital on 9/1/2020 for DKA. CTN will follow up on discharge.  MYNOR

## 2020-09-04 NOTE — DIABETES MGMT
NIKO RAYMOND  CLINICAL NURSE SPECIALIST CONSULT  PROGRAM FOR DIABETES HEALTH    FOLLOW-UP NOTE    Presentation   Ms. Stewart You is a 67-year-old -American female with a long term type 1 diabetes mellitus with multiple admissions to [address] [place] [address] for ketoacidosis and lactic acidosis. Raoul Lancaster was most recently hospitalized August 24-29 with a typical presentation of ketoacidosis which responded to traditional therapy.  She was discharged on Tresiba insulin 50 units and Humalog insulin 4 units with each meal.  She now returns.  With a blood sugar greater than 600, anion gap of 22, lactate 4.9 and creatinine 1.46    Subjective   \" I am ready to go home\"  Discussed the importance of taking her insulin daily-getting a calendar and checking off the day so she remember to give herself insulin. She states her son, who is also special needs, sometimes helps her with her medications. Objective   Physical exam  General Alert, oriented and in no acute distress. Conversant and cooperative. Vital Signs   Visit Vitals  /68 (BP 1 Location: Left arm, BP Patient Position: At rest)   Pulse 80   Temp 98 °F (36.7 °C)   Resp 18   Ht 5' 3\" (1.6 m)   Wt 65.2 kg (143 lb 11.8 oz)   SpO2 98%   BMI 25.46 kg/m²     Skin  Warm and dry  Heart   Regular rate and rhythm.  No murmurs, rubs or gallops  Lungs  Clear to auscultation without rales or rhonchi  Extremities No foot wounds  Laboratory  Lab Results   Component Value Date/Time    Hemoglobin A1c 12.5 (H) 08/25/2020 06:23 PM     Lab Results   Component Value Date/Time    LDL, calculated 55.6 06/21/2020 05:05 PM     Lab Results   Component Value Date/Time    Creatinine (POC) 1.1 09/01/2020 06:52 AM    Creatinine 0.57 09/04/2020 02:35 AM     Lab Results   Component Value Date/Time    Sodium 138 09/04/2020 02:35 AM    Potassium 4.2 09/04/2020 02:35 AM    Chloride 109 (H) 09/04/2020 02:35 AM    CO2 28 09/04/2020 02:35 AM    Anion gap 1 (L) 09/04/2020 02:35 AM    Glucose 88 09/04/2020 02:35 AM    BUN 6 09/04/2020 02:35 AM    Creatinine 0.57 09/04/2020 02:35 AM    BUN/Creatinine ratio 11 (L) 09/04/2020 02:35 AM    GFR est AA >60 09/04/2020 02:35 AM    GFR est non-AA >60 09/04/2020 02:35 AM    Calcium 7.5 (L) 09/04/2020 02:35 AM    Bilirubin, total 0.5 09/01/2020 06:38 AM    Alk. phosphatase 151 (H) 09/01/2020 06:38 AM    Protein, total 6.3 (L) 09/01/2020 06:38 AM    Albumin 3.3 (L) 09/01/2020 06:38 AM    Globulin 3.0 09/01/2020 06:38 AM    A-G Ratio 1.1 09/01/2020 06:38 AM    ALT (SGPT) 30 09/01/2020 06:38 AM     Lab Results   Component Value Date/Time    ALT (SGPT) 30 09/01/2020 06:38 AM       Factors affecting BG pattern  Factor Dose Comments   Nutrition:  Carb-controlled meals     60 grams/meal        Blood glucose pattern      Assessment and Plan   Nursing Diagnosis Risk for unstable blood glucose pattern   Nursing Intervention Domain 0530 Decision-making Support   Nursing Interventions Examined current inpatient diabetes control   Explored factors facilitating and impeding inpatient management  Identified self-management practices impeding diabetes control  Explored corrective strategies with patient and responsible inpatient provider   Informed patient of rational for insulin strategy while hospitalized  Instructed patient in importance of taking her insulin daily. Evaluation   Ms. Tushar Cohn, who has uncontrolled and non-compliant  IDDM BG has trended into within target range over yesterday. She has struggled with some hypoglycemia as well. Currently on 20units of basal daily with 4units of mealtime insulin and minimal correctional insulin needed. AM BG today 142mg/dl, and BG trends past 24h 122-189mg/dl/      Recommendations/ Discharge Planning   1. Discharge medication recommendation:  Agree with Ruby Benitez 20units daily and 4units mealtime insulin.         Billing Code(s)   I personally reviewed chart, notes, data and current medications in the medical record, and examined the patient at bedside before making care recommendations.      [x] [id number] IP subsequent hospital care - 30 minutes     SOLOMON Shabazz in collaboration with Aakash Allen  Access via [address] [place] [address]  [phone]

## 2020-09-04 NOTE — ROUTINE PROCESS
[address] END OF SHIFT REPORT Bedside and Verbal shift change report GIVEN TO VIN Blake. Report included the following information SBAR, Kardex and Recent Results. SIGNIFICANT CHANGES DURING SHIFT:  0640  Bed alarmed at this time. Pt attempting to get out of bed. Right arm PICC pulled out and PIV. Telemetry monitor on floor. Patient disoriented to place and situation. Reoriented and assisted patient to recliner. No IV access at this time. Telemetry monitor placed back on pt. No acute distress or complaints noted. Patient sitting in recliner at this time. CONCERNS TO ADDRESS WITH MD:   
 
 
 
[address] NURSING NOTE Admission Date 9/1/2020 Admission Diagnosis DKA (diabetic ketoacidoses) ([facility] [address] [E11.10] Consults IP CONSULT TO PRIMARY CARE PROVIDER 
IP CONSULT TO HOSPITALIST Cardiac Monitoring [x] Yes [] No  
  
Purposeful Hourly Rounding [x] Yes   
Tyler Score Total Score: 4 Tyler score 3 or > [x] Bed Alarm [] Avasys [] 1:1 sitter [] Patient refused (Signed refusal form in chart) Madhu Score Madhu Score: 15 Madhu score 14 or < [] PMT consult [] Wound Care consult  
 []  Specialty bed  [] Nutrition consult Influenza Vaccine Received Flu Vaccine for Current Season (usually Sept-March): Not Flu Season Oxygen needs? [x] Room air Oxygen @  []1L    []2L    []3L   []4L    []5L   []6L via NC Chronic home O2 use? [] Yes [x] No 
Perform O2 challenge test and document in progress note using smartphrase (.Homeoxygen) Last bowel movement Last Bowel Movement Date: 09/02/20 Urinary Catheter LDAs Peripheral IV 09/01/20 Left Antecubital (Active) Site Assessment Clean, dry, & intact 09/03/20 2012 Phlebitis Assessment 0 09/03/20 2012 Infiltration Assessment 0 09/03/20 2012 Dressing Status Clean, dry, & intact 09/03/20 2012 Dressing Type Disk with Chlorhexadine gluconate (CHG) 09/03/20 2012 Hub Color/Line Status Infusing;Patent;Pink 09/03/20 2012 Action Taken Open ports on tubing capped 09/03/20 2012 Alcohol Cap Used Yes 09/03/20 2012 Readmission Risk Assessment Tool Score High Risk   
      
 27 Total Score 3 Has Seen PCP in Last 6 Months (Yes=3, No=0)  
 11 IP Visits Last 12 Months (1-3=4, 4=9, >4=11) 9 Pt. Coverage (Medicare=5 , Medicaid, or Self-Pay=4) 4 Charlson Comorbidity Score (Age + Comorbid Conditions) Criteria that do not apply:  
 . Living with Significant Other. Assisted Living. LTAC. SNF. or  
Rehab Patient Length of Stay (>5 days = 3) Expected Length of Stay 2d 21h Actual Length of Stay 3

## 2020-09-04 NOTE — PROGRESS NOTES
Patient was admitted to [facility] on 2020 and discharged on 2020 for DKA. Patient was contacted within 1 business days of discharge. Top Discharge Challenges to be reviewed by the provider   Additional needs identified to be addressed with provider yes  medication  Discussed COVID-19 related testing which was available at this time. Test results were negative. Patient informed of results, if available? no   Method of communication with provider : none       Advance Care Planning:   Does patient have an Advance Directive:  DDNR addressed during hospitalization    Inpatient Readmission Risk score:   Was this a readmission? no   Patient stated reason for the admission: sugar  Patients top risk factors for readmission: medical condition  Interventions to address risk factors: attend appointments with providers, take medication as prescribed, check blood sugar and report to endo    Care Transition Nurse (CTN) contacted the family by telephone to perform post hospital discharge assessment. Verified name and  with family as identifiers. Provided introduction to self, and explanation of the CTN role. CTN reviewed discharge instructions, medical action plan and red flags with family who verbalized understanding. Family given an opportunity to ask questions and does not have any further questions or concerns at this time. The family agrees to contact the PCP office for questions related to their healthcare. Medication reconciliation was performed with family, who verbalizes understanding of administration of home medications. Advised obtaining a 90-day supply of all daily and as-needed medications.    Referral to Pharm D needed: no     Home Health/Outpatient orders at discharge: [address]: n/a  Date of initial visit: [facility] ordered at discharge: none  [address] received: n/a    Covid Risk Education    Patient has following risk factors of: diabetes. Education provided regarding infection prevention, and signs and symptoms of COVID-19 and when to seek medical attention with family who verbalized understanding. Discussed exposure protocols and quarantine From CDC: Are you at higher risk for severe illness?  and given an opportunity for questions and concerns. The family agrees to contact the COVID-19 hotline [phone] or PCP office for questions related to COVID-19. For more information on steps you can take to protect yourself, see CDC's How to Protect Yourself     Patient/family/caregiver given information for GetWell Loop and agrees to enroll no  Patient's preferred e-mail: declines  Patient's preferred phone number: declines    Discussed follow-up appointments. If no appointment was previously scheduled, appointment scheduling offered: no  [address] follow up appointment(s): No future appointments. Non-[facility] follow up appointment(s):   Plan for follow-up call in 7-10 days based on severity of symptoms and risk factors. CTN provided contact information for future needs. Goals Addressed                 This Visit's Progress     Prevent complications post hospitalization. 8/31/2020 Patient reports she is taking insulin however did not check blood sugar this morning. Son reports he will schedule appointments for follow up. Patient will check blood sugar, record number and report at provider appointment and at next outreach.  MYNOR

## 2020-09-06 ENCOUNTER — HOME CARE VISIT (OUTPATIENT)
Dept: SCHEDULING | Facility: HOME HEALTH | Age: 80
End: 2020-09-06
Payer: MEDICARE

## 2020-09-06 ENCOUNTER — HOME CARE VISIT (OUTPATIENT)
Dept: HOME HEALTH SERVICES | Facility: HOME HEALTH | Age: 80
End: 2020-09-06
Payer: MEDICARE

## 2020-09-06 VITALS
DIASTOLIC BLOOD PRESSURE: 60 MMHG | OXYGEN SATURATION: 99 % | TEMPERATURE: 97.4 F | HEART RATE: 78 BPM | RESPIRATION RATE: 18 BRPM | SYSTOLIC BLOOD PRESSURE: 112 MMHG

## 2020-09-06 PROCEDURE — G0299 HHS/HOSPICE OF RN EA 15 MIN: HCPCS

## 2020-09-06 PROCEDURE — 400013 HH SOC

## 2020-09-06 PROCEDURE — 3331090001 HH PPS REVENUE CREDIT

## 2020-09-06 PROCEDURE — 3331090002 HH PPS REVENUE DEBIT

## 2020-09-07 ENCOUNTER — HOME CARE VISIT (OUTPATIENT)
Dept: SCHEDULING | Facility: HOME HEALTH | Age: 80
End: 2020-09-07
Payer: MEDICARE

## 2020-09-07 ENCOUNTER — PATIENT OUTREACH (OUTPATIENT)
Dept: CASE MANAGEMENT | Age: 80
End: 2020-09-07

## 2020-09-07 VITALS
TEMPERATURE: 99.1 F | DIASTOLIC BLOOD PRESSURE: 72 MMHG | SYSTOLIC BLOOD PRESSURE: 126 MMHG | OXYGEN SATURATION: 92 % | RESPIRATION RATE: 16 BRPM | HEART RATE: 70 BPM

## 2020-09-07 LAB
BACTERIA SPEC CULT: NORMAL
SERVICE CMNT-IMP: NORMAL

## 2020-09-07 PROCEDURE — 3331090001 HH PPS REVENUE CREDIT

## 2020-09-07 PROCEDURE — G0151 HHCP-SERV OF PT,EA 15 MIN: HCPCS

## 2020-09-07 PROCEDURE — 3331090002 HH PPS REVENUE DEBIT

## 2020-09-07 NOTE — PROGRESS NOTES
Pt is a 67 y.o. woman who lives with a son with special needs in a tri-level home with 1 KELSI[address] the side door of the home. Pt has 4 steps to ascend to the main living area and kitchen and an additional 10 steps to get to her bedroom and full bathroom. Pt was recently hospitalized due to DKA, dementia, and DM type II with hyperglycemia. She is homebound due to impaired judgement and dementia, requiring the need for supervision. Pt is at r isk diabetic complications as she has a history of this in her past requiring hospitalization. Although pt has a diagnosis of dementia, pt was alert and oriented x 3 today. She is forgetful and she doesn't know the date but does know it is Monday. She was able to answer my questions without difficulty. Pt was able to amb 500ft inside her home and outside of her home without LOB using good technique during today's treatment without an AD . She does have mild SOB during amb which improves with VCs to take deep breaths. Pt was independent and safe with all transfers. She was able to ascend and descend 10 steps x 2 reps and 4 steps x 2 reps with a reciprocal pattern without LOB. Pt does not need further PT treatments as she is safe negotiating her home environment.

## 2020-09-07 NOTE — PROGRESS NOTES
Patient was admitted to [facility] on  and discharged on  for DX. DKA. Patient was contacted within 2 business days of discharge. Top Discharge Challenges to be reviewed by the provider   Additional needs identified to be addressed with provider no  HOME HEALTH SN/PT  Discussed COVID-19 related testing which was not done at this time. Test results were not done. Patient informed of results, if available? n/a   Method of communication with provider : chart routing       Advance Care Planning:   Does patient have an Advance Directive:  Health care decision maker on file    Was this a readmission? no   Patient stated reason for the admission: DKA  Patients top risk factors for readmission: medical condition and medication management  Interventions to address risk factors: exercise (amb), diet and hydration, take medications as directed, follow d/c instructions,f/u with physician    Care Transition Nurse (CTN) contacted the family by telephone to perform post hospital discharge assessment. Verified name and  with family as identifiers. CTN reviewed discharge instructions, medical action plan and red flags with family who verbalized understanding. Family given an opportunity to ask questions and does not have any further questions or concerns at this time. The family agrees to contact the PCP office for questions related to their healthcare. Medication reconciliation was performed with family, who verbalizes understanding of administration of home medications. Advised obtaining a 90-day supply of all daily and as-needed medications. STOP taking these medications         potassium chloride (KLOR-CON) 10 mEq tablet      Referral to Pharm D needed: no     Home Health/Outpatient orders at discharge: home health care, PT, SN and [address]: [facility]  Date of initial visit:     Covid Risk Education    Patient has following risk factors of: diabetes.  Education provided regarding infection prevention, and signs and symptoms of COVID-19 and when to seek medical attention with family who verbalized understanding. Discussed exposure protocols and quarantine From CDC: Are you at higher risk for severe illness?  and given an opportunity for questions and concerns. The family agrees to contact the PCP office for questions related to COVID-19. For more information on steps you can take to protect yourself, see CDC's How to Protect Yourself     Patient/family/caregiver given information for GetWell Loop and agrees to enroll no    Discussed follow-up appointments. yes, PHI () son/reports family will schedule appts. [facility] follow up appointment(s):   Future Appointments   Date Time Provider Tawnya Evans   9/8/2020 To Be Determined Nick Calderon LPN [address] for follow-up call in 10-14 days based on severity of symptoms and risk factors. CTN provided contact information for future needs. Goals      Establish PCP relationships and regularly scheduled appointments. 9/7/20 Pt.lives with sons, family provides transportation to appts. Family will schedule follow up with pt. PCP Phu Walton MD) [phone]. CTN provided pt.family with information [facility] [phone])  explain briefly type of service;  contact information provided. CTN will f/u with pt.in 1-2 weeks. -[address]         Knowledge and adherence to medication plan. 9/7  Patient/family will understand general knowledge of medications, s/e and take meds as prescribed. CTN reviewed medications list with (PHI) son,emphasized compliance; will check FBS 2 times a day, notify PCP any high/low readings. CTN encouraged to call with any questions or concerns. -              Prepare patients and caregivers for end of life decisions (ie. need for hospice, pain management, symptom relief, advance directives etc.)        2/26/2020   SW emailed daughter a blank copy of AMD to review and complete with patient. 12/13/2019  1:30pm  Patient does not have an Advance Medical Directive on file. She has her daughter, Douglas Garcia listed as her primary healthcare decision maker and, her two sons Mikel Nelson and Alcon Bright as second and third contacts. SW will review and discuss the importance of having a completed AMD on file and offer assistance with completing one if interested. [facility] Transitions Team          Prevent complications post hospitalization. 8/31/2020 Patient reports she is taking insulin however did not check blood sugar this morning. Son reports he will schedule appointments for follow up. Patient will check blood sugar, record number and report at provider appointment and at next outreach. [facility]    9/4/2020 CTN received notification patient readmitted to hospital on 9/1/2020 for DKA. CTN will follow up on discharge. [facility]         Supportive resources: medicaid assistance and options for caregiver support        7/6/20  SW received referral from CTN requesting a follow up call with patient's daughter to offer assistance/support with establishing personal care and assistance for patient. Per CTN, a UAI screening was completed while pt was hospitalized and daughter was working with [facility] program to establish day program/Medicaid. SW left voice mails requesting a call back. SW will follow up once call is returned  Dotty Milton [address] [place] [address] Ambulatory Care Coordination Team  [phone]      2/26/2020   CT SW received a call from daughter discussing pt's plan. Daughter stated that she is having difficulty with getting in contact with [facility] to initiate UAI screening for personal care. SW reviewed number that was provided and encouraged her to make another outreach attempt.   SW also provided her with pt's [employer] CM contact information and explained the purpose/benefits for CM services through insurance provider. YOLANDA emailed her a blank ABIEL form to complete and fax to [address] [place] [address] giving her permission to communicate on pt's behalf. She reported that pt is doing well and she is planning to assist her to PCP f/u appointment tomorrow. YOLANDA will follow up with daughter at a later date. [place] [address] Ambulatory Care Coordination Team  [phone]    2/25/20  CT YOLANDA left a message with patient's daughter requesting a call back. YOLANDA will provide daughter with pt's [employer]  name and number and discuss the process of submitting an authorization release to [address] [place] [address] on behalf of the patient for caregiver purposes. 2/21/20  CT YOLANDA contacted patient's daughter and advised her to contact Liseth TONY to initiate Uniform Assessment Instrument screening for personal care/LTC assistance. Daughter reported that she submitted a medicaid application. Daughter in agreement with receiving assistance from PharmD for Diabetes education/tx options for patient. YOLANDA will update Woody Pablo PharmD of this request.  Rosalie Davis will follow up at a later date to inquire about progress and offer assistance if needed. [place] [address] Ambulatory Care Coordination Team  [phone]    2/20/20  CT YOLANDA was notified of patient's admission to [id number] [place], [place] [address]as [facility]. YOLANDA left a message with pt's  Fleeta Holiday requesting a uniform assessment screening for personal care/LTC services. YOLANAD will attempt contact at a later date if call is not returned. Daughter was also made aware of the UAI request.      2/18/20  YOLANDA discussed with daughter about the purpose and benefit of receiving Diabetes education from [facility]. Daughter stated that she understands how to manage pt's medical condition, but the challenge is ensuring that pt eat her meals and take medications consistently. Daughter stated that she calls her mom several times during the day to remind her to take meds, eat meals etc.  SW discussed the option of patient attending an adult  program during the week to assist with needs until her personal care is established. Daughter stated that pt would not be interested in attending but she will share this with pt as an option. YOLANDA contacted  DSS to iniate the UAI screening for personal care. YOLANDA advised daughter to contact Mrs. Bandar Gar at [facility] [phone] to provide additional information for screening. [place] [address] Ambulatory Care Coordination Team  [phone]    2/17/20  YOLANDA contacted daughter for f/u. Daughter reported that she/pt submitted the medicaid application. SW explained to daughter the f/u process and services offered though medicaid. SW/daughter also discussed the benefit of having pt's medications bubbled pack and advised her to contact pharmacy to inquire about the process. YOLANDA will f/u with Angel Gar RD to coordinate a meeting for pt/daughter to receive diabetes education. YOLANDA will f/u at a later date. [place] [address] Ambulatory Care Coordination Team  [phone]  1/31/2020   YOLANDA attempted to contacted patient's daughter to follow up on progress made with completing medicaid application and offer assistance. YOLANDA left a voicemail requesting a call back. [place] [address] Ambulatory Care Coordination Team  [phone]    1/15/2020   MARLEN GUERRERO received a call back from [facility] providing an update on patients home visit. YOLANDA met with patient in the home and spoke with the daughter via phone during the visit. Brian Saab reported there were no concerns observed during the visit.   Brian Saab reported that patient was alert, verbal and oriented during the visit, as she appropriately shared information and answered questions. However, It is noted that [facility] SN created goals and interventions to address patients cognitive impairment. Sarah Tomasjuan discussed with daughter the importance of having Medicaid coverage in place, as it could provide personal care aides to assist patient in the home. SW explained to daughter the concerns that were shared regarding patients care in the home and offered to schedule a date and time to meet with her to complete a Medicaid application and submit a request for UAI screening, but daughter was not able to confirm a date. ALMA DELIA GUERRERO will follow up with daughter at a later date to schedule meeting. See [facility] YOLANDA note. [place] [address] Ambulatory Care Coordination Team  [phone]    1/10/2020 3:00pm  MARLEN GUERRERO received a call back from [facility] YOLANDA, Josef Paula. CTSATNAM SW updated her on the concerns related to patient's care and safety in the home. 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 According to patients daughter, patient owns her home and the son lives with her. Patients daughter is involved with her care but does not live with her. ALMA DELIA GUERRERO made aware of the resources that were provided to daughter regarding the Medicaid process and discussed the possibility of patient participating in [facility] chronic care program for disease management. ALMA DELIA GUERRERO is scheduled to conduct a home visit with patient next week. Due to patients cognition limitations, YOLANDA suggested that [facility] SW contact patients son/daughter to schedule a home visit.  MARLEN GUERRERO mentioned, pending the outcome of home assessment there may be a need for an APS referral. ALMA DELIA GUERRERO acknowledged the report given and will follow up with MARLEN GUERRERO at a later date to discuss findings. [place] [address] Ambulatory Care Coordination Team  [phone]    1/9/2020 2:30am  [address] rep, reported that patient is scheduled to receive a home visit from [facility] on 1-13-20. CTN YOLANDA called home health SW, Oli Patel requesting a call back to discuss concerns reg patient's care/safety and address social needs. SW will attempt call at a later date if call is not returned. [place] [address] Ambulatory Care Coordination Team  [phone]    1/7/2020  1:00pm  It is noted that patient was recently discharged from the hospital due to DKA. SW contacted patient's daughter, Mejia Breaux, to discuss resources to address patient's needs. Daughter stated that she was busy, but was receptive to taking a few minutes to discuss options related to patient's care. SW explained to daughter the Medicaid process along with the benefits of Medicaid coverage. SW highlighted a few Medicaid services that patient could receive if approved, such as personal care, adult  coverage, assistance with medication cost and alternative placement if needed. Daughter acknowledged the services and stated that her goal is to care for patient in the home. Patient's son live with her and daughter assist with providing care. SW advised daughter to contact [address] [place] [address] to initiate the application process, as it could take up to 45 days for Medicaid approval.  Daughter understood and stated she and patient will complete the application by the end of the week. Daughter denied any additional resources at this time. SW provided daughter my contact information to contact if needed      SW will contact daughter within the next two weeks to assess progress made with completing application, discuss ACP items and offer assistance if needed.    Rajeev Sanon [facility] Team  [phone]     12/17/2019  1:30pm  SW attempted to contact patient's daughter for follow up. SW left a detailed voicemail requesting a call back, and suggested that she leave on my voicemail best time/date to reach her. SW will follow up at a later date. 12/13/2019  1:25pm  Patient's daughter requested assistance with understanding medicaid process and initiating application. SW contacted daughter however, she reported that she was unable to talk and stated she would contact SW [id number] at 1:30pm.      Neville Del Castillo will attempt contact on 12-16-19 if call is not returned. SW will offer assistance with medicaid process and additional resources if needed.         [facility] Transitions Team

## 2020-09-08 ENCOUNTER — HOME CARE VISIT (OUTPATIENT)
Dept: SCHEDULING | Facility: HOME HEALTH | Age: 80
End: 2020-09-08
Payer: MEDICARE

## 2020-09-08 PROCEDURE — G0300 HHS/HOSPICE OF LPN EA 15 MIN: HCPCS

## 2020-09-08 PROCEDURE — 3331090002 HH PPS REVENUE DEBIT

## 2020-09-08 PROCEDURE — 3331090001 HH PPS REVENUE CREDIT

## 2020-09-09 ENCOUNTER — HOME CARE VISIT (OUTPATIENT)
Dept: HOME HEALTH SERVICES | Facility: HOME HEALTH | Age: 80
End: 2020-09-09
Payer: MEDICARE

## 2020-09-09 VITALS — TEMPERATURE: 97.9 F | HEART RATE: 79 BPM | OXYGEN SATURATION: 98 %

## 2020-09-09 PROCEDURE — 3331090002 HH PPS REVENUE DEBIT

## 2020-09-09 PROCEDURE — 3331090001 HH PPS REVENUE CREDIT

## 2020-09-10 ENCOUNTER — HOME CARE VISIT (OUTPATIENT)
Dept: SCHEDULING | Facility: HOME HEALTH | Age: 80
End: 2020-09-10
Payer: MEDICARE

## 2020-09-10 LAB
BACTERIA SPEC CULT: ABNORMAL
BACTERIA SPEC CULT: ABNORMAL
SERVICE CMNT-IMP: ABNORMAL

## 2020-09-10 PROCEDURE — G0155 HHCP-SVS OF CSW,EA 15 MIN: HCPCS

## 2020-09-10 PROCEDURE — G0299 HHS/HOSPICE OF RN EA 15 MIN: HCPCS

## 2020-09-10 PROCEDURE — 3331090001 HH PPS REVENUE CREDIT

## 2020-09-10 PROCEDURE — 3331090002 HH PPS REVENUE DEBIT

## 2020-09-10 NOTE — PROGRESS NOTES
[facility]  [address] [place] [address]  [address] [place] [address]  Phone:  [phone]  Fax: [phone]       Chief Complaint   Patient presents with    Diabetes     1 month follow up    . SUBJECTIVE:    Martinez Horvath is a 77 y.o. female   Comes in for a return visit stating that she feels great. She has been taking [place] 20 units every morning and has not had any symptomatic hypoglycemia. When she does feel the sensation of low sugar, she has time to act. She does not check sugars in the evening, only in the morning and they have been consistently in the 100 range. She has a past history of primary hypertension and dyslipidemia. She commented previously that she felt her memory was not as good as before. In reality, this has improved somewhat. MedDATA/gwo     Finally, she has been taking her thyroid supplement as prescribed in view of her history of hypothyroidism. MedDATA/gwo              Current Outpatient Prescriptions   Medication Sig Dispense Refill    insulin degludec (TRESIBA FLEXTOUCH U-100) 100 unit/mL (3 mL) inpn 10 units every morning 2 Pen 0    potassium chloride SR (K-TAB) 20 mEq tablet Take 2 Tabs by mouth daily. 20 Tab 0    BD INSULIN PEN NEEDLE UF SHORT 31 gauge x 5/16\" ndle USE AS DIRECTED TWICE A DAY 60 Pen Needle 11    pravastatin (PRAVACHOL) 40 mg tablet Take 1 Tab by mouth nightly. 30 Tab 11    losartan-hydroCHLOROthiazide (HYZAAR) 100-25 mg per tablet TAKE 1 TABLET BY MOUTH DAILY 30 Tab 11    clopidogrel (PLAVIX) 75 mg tab Take 1 Tab by mouth daily.  30 Tab 11    fenofibrate nanocrystallized (TRICOR) 145 mg tablet TAKE 1 TABLET BY MOUTH EVERY DAY 30 Tab 11    BD INSULIN PEN NEEDLE UF SHORT 31 gauge x 5/16\" ndle USE AS DIRECTED TWICE A DAY  11    levothyroxine (SYNTHROID) 150 mcg tablet TAKE 1 TABLET BY MOUTH EVERY DAY 30 Tab 11    amLODIPine (NORVASC) 10 mg tablet TAKE 1 TABLET BY MOUTH EVERY MORNING 30 Tab 11    brimonidine (ALPHAGAN) 0.15 % ophthalmic solution Administer 1 Drop to both eyes two (2) times a day.  latanoprost (XALATAN) 0.005 % ophthalmic solution Administer 1 Drop to both eyes nightly.          Past Medical History:   Diagnosis Date    Diabetes ([facility]     Heart failure ([facility]     unknown to family    Hypercholesteremia     Hypertension     [facility]     Thyroid disease      Past Surgical History:   Procedure Laterality Date    HX GYN      HX HEENT      thyroidectomy    HX HYSTERECTOMY      REMOVAL GALLBLADDER      THYROIDECTOMY       No Known Allergies      REVIEW OF SYSTEMS:  General: negative for - chills or fever  ENT: negative for - headaches, nasal congestion or tinnitus  Respiratory: negative for - cough, hemoptysis, shortness of breath or wheezing  Cardiovascular : negative for - chest pain, edema, palpitations or shortness of breath  Gastrointestinal: negative for - abdominal pain, blood in stools, heartburn or nausea/vomiting  Genito-Urinary: no dysuria, trouble voiding, or hematuria  Musculoskeletal: negative for - gait disturbance, joint pain, joint stiffness or joint swelling  Neurological: no TIA or stroke symptoms  Hematologic: no bruises, no bleeding, no swollen glands  Integument: no lumps, mole changes, nail changes or rash  Endocrine: no malaise/lethargy or unexpected weight changes      Social History     Social History    Marital status:      Spouse name: N/A    Number of children: 1    Years of education: N/A     Occupational History    retired      Social History Main Topics    Smoking status: Never Smoker    Smokeless tobacco: Never Used    Alcohol use No      Comment: been years    Drug use: No    Sexual activity: Not Currently     Other Topics Concern    None     Social History Narrative     Family History   Problem Relation Age of Onset    Diabetes Father     No Known Problems Mother        OBJECTIVE:    Visit Vitals    /66 (BP 1 Location: Right arm, BP Patient Position: Sitting)    Pulse 65    Temp 98.6 °F (37 °C) (Oral)    Resp 16    Ht 5' 3\" (1.6 m)    Wt 166 lb 6.4 oz (75.5 kg)    SpO2 96%    BMI 29.48 kg/m2     CONSTITUTIONAL: well , well nourished, appears age appropriate  EYES: perrla, eom intact  ENMT:moist mucous membranes, pharynx clear  NECK: supple. Thyroid normal  RESPIRATORY: Chest: clear to ascultation and percussion   CARDIOVASCULAR: Heart: regular rate and rhythm  GASTROINTESTINAL: Abdomen: soft, bowel sounds active  HEMATOLOGIC: no pathological lymph nodes palpated  MUSCULOSKELETAL: Extremities: no edema, pulse 1+   INTEGUMENT: No unusual rashes or suspicious skin lesions noted. Nails appear normal.  NEUROLOGIC: non-focal exam   MENTAL STATUS: alert and oriented, appropriate affect      ASSESSMENT:  1. Type 2 diabetes mellitus without complication, with long-term current use of insulin ([address]    2. Essential hypertension    3. Dyslipidemia    4. Hypothyroidism due to medication        PLAN:    1. She appears to be doing well. She tells me she is taking 20 units, in reality the chart suggests 10 units. This will be rectified. A fructosamine level will be obtained. I suggest she check sugars at least twice a day, ac breakfast and dinner. I will not add prandial coverage for now. Technically, she is a type 1b diabetic. I reminded her of the importance of eating at the same time every day. 2. Blood pressure is excellent, no adjustments are made. 3. She will continue statin as prescribed in view of her increased cardiovascular risk. 4. She will also continue her thyroid supplement. Her last TSH was reasonable. MedDATA/gwo         . Orders Placed This Encounter    FRUCTOSAMINE         Follow-up Disposition:  Return in about 4 weeks (around 1/18/2018).       Chaz De León MD Detail Level: Generalized Detail Level: Detailed

## 2020-09-11 ENCOUNTER — HOME CARE VISIT (OUTPATIENT)
Dept: HOME HEALTH SERVICES | Facility: HOME HEALTH | Age: 80
End: 2020-09-11
Payer: MEDICARE

## 2020-09-11 PROCEDURE — 3331090002 HH PPS REVENUE DEBIT

## 2020-09-11 PROCEDURE — 3331090001 HH PPS REVENUE CREDIT

## 2020-09-11 NOTE — PROGRESS NOTES
Catherine Espinoza (from [facility]) called to let us know they are having difficulty reaching Dr. Jt Rush office  regarding wound care supplie and an order from MD,  pt Sue Alvarado ( [phone]) and after 5 days they will have to cancel the order if they can't reach him. Her insurance requires we use [facility]. She said she has left several messages at the office. Please return her call so Mrs Judah Harris has supplies. thanks!

## 2020-09-12 PROCEDURE — 3331090001 HH PPS REVENUE CREDIT

## 2020-09-12 PROCEDURE — 3331090002 HH PPS REVENUE DEBIT

## 2020-09-13 VITALS
TEMPERATURE: 98.4 F | HEART RATE: 81 BPM | DIASTOLIC BLOOD PRESSURE: 58 MMHG | OXYGEN SATURATION: 98 % | RESPIRATION RATE: 18 BRPM | SYSTOLIC BLOOD PRESSURE: 110 MMHG

## 2020-09-13 PROCEDURE — 3331090002 HH PPS REVENUE DEBIT

## 2020-09-13 PROCEDURE — 3331090001 HH PPS REVENUE CREDIT

## 2020-09-14 PROCEDURE — 3331090001 HH PPS REVENUE CREDIT

## 2020-09-14 PROCEDURE — 3331090002 HH PPS REVENUE DEBIT

## 2020-09-15 ENCOUNTER — HOME CARE VISIT (OUTPATIENT)
Dept: SCHEDULING | Facility: HOME HEALTH | Age: 80
End: 2020-09-15
Payer: MEDICARE

## 2020-09-15 ENCOUNTER — HOME CARE VISIT (OUTPATIENT)
Dept: HOME HEALTH SERVICES | Facility: HOME HEALTH | Age: 80
End: 2020-09-15
Payer: MEDICARE

## 2020-09-15 PROCEDURE — 3331090002 HH PPS REVENUE DEBIT

## 2020-09-15 PROCEDURE — 3331090001 HH PPS REVENUE CREDIT

## 2020-09-16 ENCOUNTER — PATIENT OUTREACH (OUTPATIENT)
Dept: CASE MANAGEMENT | Age: 80
End: 2020-09-16

## 2020-09-16 ENCOUNTER — HOSPITAL ENCOUNTER (INPATIENT)
Age: 80
LOS: 5 days | Discharge: HOME HEALTH | DRG: 638 | End: 2020-09-21
Attending: EMERGENCY MEDICINE | Admitting: HOSPITALIST
Payer: MEDICARE

## 2020-09-16 DIAGNOSIS — E10.10 TYPE 1 DIABETES MELLITUS WITH KETOACIDOSIS WITHOUT COMA (HCC): Primary | ICD-10-CM

## 2020-09-16 PROBLEM — E86.0 DEHYDRATION: Status: ACTIVE | Noted: 2020-09-16

## 2020-09-16 PROBLEM — E87.20 LACTIC ACID ACIDOSIS: Status: ACTIVE | Noted: 2020-09-16

## 2020-09-16 PROBLEM — N17.9 AKI (ACUTE KIDNEY INJURY) (HCC): Status: ACTIVE | Noted: 2020-09-16

## 2020-09-16 PROBLEM — E11.10 DIABETIC KETO-ACIDOSIS (HCC): Status: ACTIVE | Noted: 2020-09-16

## 2020-09-16 LAB
ALBUMIN SERPL-MCNC: 3.4 G/DL (ref 3.5–5)
ALBUMIN/GLOB SERPL: 0.9 {RATIO} (ref 1.1–2.2)
ALP SERPL-CCNC: 139 U/L (ref 45–117)
ALT SERPL-CCNC: 26 U/L (ref 12–78)
ANION GAP SERPL CALC-SCNC: 13 MMOL/L (ref 5–15)
ANION GAP SERPL CALC-SCNC: 28 MMOL/L (ref 5–15)
ANION GAP SERPL CALC-SCNC: 29 MMOL/L (ref 5–15)
ANION GAP SERPL CALC-SCNC: 6 MMOL/L (ref 5–15)
ANION GAP SERPL CALC-SCNC: 6 MMOL/L (ref 5–15)
ARTERIAL PATENCY WRIST A: ABNORMAL
ARTERIAL PATENCY WRIST A: ABNORMAL
ARTERIAL PATENCY WRIST A: NO
AST SERPL-CCNC: 20 U/L (ref 15–37)
ATRIAL RATE: 91 BPM
BASE DEFICIT BLD-SCNC: 10 MMOL/L
BASE DEFICIT BLD-SCNC: 4 MMOL/L
BASOPHILS # BLD: 0 K/UL (ref 0–0.1)
BASOPHILS # BLD: 0 K/UL (ref 0–0.1)
BASOPHILS NFR BLD: 0 % (ref 0–1)
BASOPHILS NFR BLD: 0 % (ref 0–1)
BDY SITE: ABNORMAL
BILIRUB SERPL-MCNC: 0.5 MG/DL (ref 0.2–1)
BLASTS NFR BLD MANUAL: 0 %
BUN SERPL-MCNC: 30 MG/DL (ref 6–20)
BUN SERPL-MCNC: 31 MG/DL (ref 6–20)
BUN SERPL-MCNC: 38 MG/DL (ref 6–20)
BUN SERPL-MCNC: 40 MG/DL (ref 6–20)
BUN SERPL-MCNC: 42 MG/DL (ref 6–20)
BUN/CREAT SERPL: 20 (ref 12–20)
BUN/CREAT SERPL: 21 (ref 12–20)
BUN/CREAT SERPL: 24 (ref 12–20)
BUN/CREAT SERPL: 25 (ref 12–20)
BUN/CREAT SERPL: 26 (ref 12–20)
CA-I BLD-SCNC: 0.97 MMOL/L (ref 1.12–1.32)
CA-I BLD-SCNC: 1.11 MMOL/L (ref 1.12–1.32)
CA-I BLD-SCNC: 1.12 MMOL/L (ref 1.12–1.32)
CALCIUM SERPL-MCNC: 7.6 MG/DL (ref 8.5–10.1)
CALCIUM SERPL-MCNC: 7.7 MG/DL (ref 8.5–10.1)
CALCIUM SERPL-MCNC: 7.8 MG/DL (ref 8.5–10.1)
CALCIUM SERPL-MCNC: 7.9 MG/DL (ref 8.5–10.1)
CALCIUM SERPL-MCNC: 9.2 MG/DL (ref 8.5–10.1)
CALCULATED P AXIS, ECG09: 54 DEGREES
CALCULATED R AXIS, ECG10: -54 DEGREES
CALCULATED T AXIS, ECG11: 28 DEGREES
CHLORIDE SERPL-SCNC: 106 MMOL/L (ref 97–108)
CHLORIDE SERPL-SCNC: 109 MMOL/L (ref 97–108)
CHLORIDE SERPL-SCNC: 110 MMOL/L (ref 97–108)
CHLORIDE SERPL-SCNC: 110 MMOL/L (ref 97–108)
CHLORIDE SERPL-SCNC: 96 MMOL/L (ref 97–108)
CO2 SERPL-SCNC: 18 MMOL/L (ref 21–32)
CO2 SERPL-SCNC: 25 MMOL/L (ref 21–32)
CO2 SERPL-SCNC: 26 MMOL/L (ref 21–32)
CO2 SERPL-SCNC: 5 MMOL/L (ref 21–32)
CO2 SERPL-SCNC: 6 MMOL/L (ref 21–32)
COMMENT, HOLDF: NORMAL
CREAT SERPL-MCNC: 1.14 MG/DL (ref 0.55–1.02)
CREAT SERPL-MCNC: 1.31 MG/DL (ref 0.55–1.02)
CREAT SERPL-MCNC: 1.53 MG/DL (ref 0.55–1.02)
CREAT SERPL-MCNC: 1.88 MG/DL (ref 0.55–1.02)
CREAT SERPL-MCNC: 2.13 MG/DL (ref 0.55–1.02)
DIAGNOSIS, 93000: NORMAL
DIFFERENTIAL METHOD BLD: ABNORMAL
DIFFERENTIAL METHOD BLD: ABNORMAL
EOSINOPHIL # BLD: 0 K/UL (ref 0–0.4)
EOSINOPHIL # BLD: 0 K/UL (ref 0–0.4)
EOSINOPHIL NFR BLD: 0 % (ref 0–7)
EOSINOPHIL NFR BLD: 0 % (ref 0–7)
ERYTHROCYTE [DISTWIDTH] IN BLOOD BY AUTOMATED COUNT: 14.5 % (ref 11.5–14.5)
ERYTHROCYTE [DISTWIDTH] IN BLOOD BY AUTOMATED COUNT: 14.8 % (ref 11.5–14.5)
GAS FLOW.O2 O2 DELIVERY SYS: ABNORMAL L/MIN
GLOBULIN SER CALC-MCNC: 3.8 G/DL (ref 2–4)
GLUCOSE BLD STRIP.AUTO-MCNC: 109 MG/DL (ref 65–100)
GLUCOSE BLD STRIP.AUTO-MCNC: 113 MG/DL (ref 65–100)
GLUCOSE BLD STRIP.AUTO-MCNC: 116 MG/DL (ref 65–100)
GLUCOSE BLD STRIP.AUTO-MCNC: 140 MG/DL (ref 65–100)
GLUCOSE BLD STRIP.AUTO-MCNC: 169 MG/DL (ref 65–100)
GLUCOSE BLD STRIP.AUTO-MCNC: 175 MG/DL (ref 65–100)
GLUCOSE BLD STRIP.AUTO-MCNC: 211 MG/DL (ref 65–100)
GLUCOSE BLD STRIP.AUTO-MCNC: 302 MG/DL (ref 65–100)
GLUCOSE BLD STRIP.AUTO-MCNC: 357 MG/DL (ref 65–100)
GLUCOSE BLD STRIP.AUTO-MCNC: 364 MG/DL (ref 65–100)
GLUCOSE BLD STRIP.AUTO-MCNC: 447 MG/DL (ref 65–100)
GLUCOSE BLD STRIP.AUTO-MCNC: 52 MG/DL (ref 65–100)
GLUCOSE BLD STRIP.AUTO-MCNC: 578 MG/DL (ref 65–100)
GLUCOSE BLD STRIP.AUTO-MCNC: 88 MG/DL (ref 65–100)
GLUCOSE BLD STRIP.AUTO-MCNC: 89 MG/DL (ref 65–100)
GLUCOSE BLD STRIP.AUTO-MCNC: 94 MG/DL (ref 65–100)
GLUCOSE BLD STRIP.AUTO-MCNC: >600 MG/DL (ref 65–100)
GLUCOSE SERPL-MCNC: 155 MG/DL (ref 65–100)
GLUCOSE SERPL-MCNC: 344 MG/DL (ref 65–100)
GLUCOSE SERPL-MCNC: 638 MG/DL (ref 65–100)
GLUCOSE SERPL-MCNC: 867 MG/DL (ref 65–100)
GLUCOSE SERPL-MCNC: 97 MG/DL (ref 65–100)
HCO3 BLD-SCNC: 15.9 MMOL/L (ref 22–26)
HCO3 BLD-SCNC: 18.4 MMOL/L (ref 22–26)
HCT VFR BLD AUTO: 30.7 % (ref 35–47)
HCT VFR BLD AUTO: 37.7 % (ref 35–47)
HGB BLD-MCNC: 10.1 G/DL (ref 11.5–16)
HGB BLD-MCNC: 11.6 G/DL (ref 11.5–16)
IMM GRANULOCYTES # BLD AUTO: 0 K/UL
IMM GRANULOCYTES # BLD AUTO: 0 K/UL (ref 0–0.04)
IMM GRANULOCYTES NFR BLD AUTO: 0 %
IMM GRANULOCYTES NFR BLD AUTO: 0 % (ref 0–0.5)
LACTATE SERPL-SCNC: 2.5 MMOL/L (ref 0.4–2)
LACTATE SERPL-SCNC: 6.1 MMOL/L (ref 0.4–2)
LACTATE SERPL-SCNC: 7.3 MMOL/L (ref 0.4–2)
LYMPHOCYTES # BLD: 0.7 K/UL (ref 0.8–3.5)
LYMPHOCYTES # BLD: 1.3 K/UL (ref 0.8–3.5)
LYMPHOCYTES NFR BLD: 10 % (ref 12–49)
LYMPHOCYTES NFR BLD: 4 % (ref 12–49)
MCH RBC QN AUTO: 31.4 PG (ref 26–34)
MCH RBC QN AUTO: 31.5 PG (ref 26–34)
MCHC RBC AUTO-ENTMCNC: 30.8 G/DL (ref 30–36.5)
MCHC RBC AUTO-ENTMCNC: 32.9 G/DL (ref 30–36.5)
MCV RBC AUTO: 101.9 FL (ref 80–99)
MCV RBC AUTO: 95.6 FL (ref 80–99)
METAMYELOCYTES NFR BLD MANUAL: 0 %
METAMYELOCYTES NFR BLD MANUAL: 1 %
MONOCYTES # BLD: 0.5 K/UL (ref 0–1)
MONOCYTES # BLD: 0.8 K/UL (ref 0–1)
MONOCYTES NFR BLD: 4 % (ref 5–13)
MONOCYTES NFR BLD: 5 % (ref 5–13)
MYELOCYTES NFR BLD MANUAL: 1 %
NEUTS BAND NFR BLD MANUAL: 2 % (ref 0–6)
NEUTS SEG # BLD: 10.7 K/UL (ref 1.8–8)
NEUTS SEG # BLD: 14.9 K/UL (ref 1.8–8)
NEUTS SEG NFR BLD: 85 % (ref 32–75)
NEUTS SEG NFR BLD: 88 % (ref 32–75)
NRBC # BLD: 0 K/UL (ref 0–0.01)
NRBC # BLD: 0 K/UL (ref 0–0.01)
NRBC BLD-RTO: 0 PER 100 WBC
NRBC BLD-RTO: 0 PER 100 WBC
OTHER CELLS NFR BLD MANUAL: 0 %
P-R INTERVAL, ECG05: 142 MS
PCO2 BLD: 22.1 MMHG (ref 35–45)
PCO2 BLD: 28.9 MMHG (ref 35–45)
PCO2 BLD: <15 MMHG (ref 35–45)
PH BLD: 7.05 [PH] (ref 7.35–7.45)
PH BLD: 7.35 [PH] (ref 7.35–7.45)
PH BLD: 7.53 [PH] (ref 7.35–7.45)
PLATELET # BLD AUTO: 321 K/UL (ref 150–400)
PLATELET # BLD AUTO: 400 K/UL (ref 150–400)
PMV BLD AUTO: 10.6 FL (ref 8.9–12.9)
PMV BLD AUTO: 9.6 FL (ref 8.9–12.9)
PO2 BLD: 166 MMHG (ref 80–100)
PO2 BLD: 57 MMHG (ref 80–100)
PO2 BLD: 82 MMHG (ref 80–100)
POTASSIUM SERPL-SCNC: 3 MMOL/L (ref 3.5–5.1)
POTASSIUM SERPL-SCNC: 3.2 MMOL/L (ref 3.5–5.1)
POTASSIUM SERPL-SCNC: 3.6 MMOL/L (ref 3.5–5.1)
POTASSIUM SERPL-SCNC: 4.3 MMOL/L (ref 3.5–5.1)
POTASSIUM SERPL-SCNC: 5.9 MMOL/L (ref 3.5–5.1)
PROMYELOCYTES NFR BLD MANUAL: 0 %
PROT SERPL-MCNC: 7.2 G/DL (ref 6.4–8.2)
Q-T INTERVAL, ECG07: 372 MS
QRS DURATION, ECG06: 90 MS
QTC CALCULATION (BEZET), ECG08: 457 MS
RBC # BLD AUTO: 3.21 M/UL (ref 3.8–5.2)
RBC # BLD AUTO: 3.7 M/UL (ref 3.8–5.2)
RBC MORPH BLD: ABNORMAL
RBC MORPH BLD: ABNORMAL
SAMPLES BEING HELD,HOLD: NORMAL
SAO2 % BLD: 93 % (ref 92–97)
SAO2 % BLD: 99 % (ref 92–97)
SERVICE CMNT-IMP: ABNORMAL
SERVICE CMNT-IMP: NORMAL
SODIUM SERPL-SCNC: 131 MMOL/L (ref 136–145)
SODIUM SERPL-SCNC: 139 MMOL/L (ref 136–145)
SODIUM SERPL-SCNC: 140 MMOL/L (ref 136–145)
SODIUM SERPL-SCNC: 141 MMOL/L (ref 136–145)
SODIUM SERPL-SCNC: 142 MMOL/L (ref 136–145)
SPECIMEN TYPE: ABNORMAL
TOTAL RESP. RATE, ITRR: 16
TOTAL RESP. RATE, ITRR: 18
VENTRICULAR RATE, ECG03: 91 BPM
WBC # BLD AUTO: 12.6 K/UL (ref 3.6–11)
WBC # BLD AUTO: 16.6 K/UL (ref 3.6–11)

## 2020-09-16 PROCEDURE — 3331090002 HH PPS REVENUE DEBIT

## 2020-09-16 PROCEDURE — 74011636637 HC RX REV CODE- 636/637: Performed by: EMERGENCY MEDICINE

## 2020-09-16 PROCEDURE — 74011250637 HC RX REV CODE- 250/637: Performed by: HOSPITALIST

## 2020-09-16 PROCEDURE — 83605 ASSAY OF LACTIC ACID: CPT

## 2020-09-16 PROCEDURE — 74011000250 HC RX REV CODE- 250: Performed by: INTERNAL MEDICINE

## 2020-09-16 PROCEDURE — 85027 COMPLETE CBC AUTOMATED: CPT

## 2020-09-16 PROCEDURE — 74011000250 HC RX REV CODE- 250: Performed by: EMERGENCY MEDICINE

## 2020-09-16 PROCEDURE — 85025 COMPLETE CBC W/AUTO DIFF WBC: CPT

## 2020-09-16 PROCEDURE — 93005 ELECTROCARDIOGRAM TRACING: CPT

## 2020-09-16 PROCEDURE — 74011636637 HC RX REV CODE- 636/637: Performed by: HOSPITALIST

## 2020-09-16 PROCEDURE — 74011250636 HC RX REV CODE- 250/636: Performed by: INTERNAL MEDICINE

## 2020-09-16 PROCEDURE — 80048 BASIC METABOLIC PNL TOTAL CA: CPT

## 2020-09-16 PROCEDURE — 74011250637 HC RX REV CODE- 250/637: Performed by: INTERNAL MEDICINE

## 2020-09-16 PROCEDURE — 96361 HYDRATE IV INFUSION ADD-ON: CPT

## 2020-09-16 PROCEDURE — 2709999900 HC NON-CHARGEABLE SUPPLY

## 2020-09-16 PROCEDURE — 36600 WITHDRAWAL OF ARTERIAL BLOOD: CPT

## 2020-09-16 PROCEDURE — 96374 THER/PROPH/DIAG INJ IV PUSH: CPT

## 2020-09-16 PROCEDURE — 80053 COMPREHEN METABOLIC PANEL: CPT

## 2020-09-16 PROCEDURE — 82803 BLOOD GASES ANY COMBINATION: CPT

## 2020-09-16 PROCEDURE — 82962 GLUCOSE BLOOD TEST: CPT

## 2020-09-16 PROCEDURE — 77030038269 HC DRN EXT URIN PURWCK BARD -A

## 2020-09-16 PROCEDURE — 74011250636 HC RX REV CODE- 250/636: Performed by: HOSPITALIST

## 2020-09-16 PROCEDURE — 3331090001 HH PPS REVENUE CREDIT

## 2020-09-16 PROCEDURE — 36415 COLL VENOUS BLD VENIPUNCTURE: CPT

## 2020-09-16 PROCEDURE — 99285 EMERGENCY DEPT VISIT HI MDM: CPT

## 2020-09-16 PROCEDURE — 74011000250 HC RX REV CODE- 250: Performed by: HOSPITALIST

## 2020-09-16 PROCEDURE — 96375 TX/PRO/DX INJ NEW DRUG ADDON: CPT

## 2020-09-16 PROCEDURE — 74011250636 HC RX REV CODE- 250/636: Performed by: EMERGENCY MEDICINE

## 2020-09-16 PROCEDURE — 65660000000 HC RM CCU STEPDOWN

## 2020-09-16 PROCEDURE — 74011000258 HC RX REV CODE- 258: Performed by: HOSPITALIST

## 2020-09-16 RX ORDER — SODIUM BICARBONATE 1 MEQ/ML
50 SYRINGE (ML) INTRAVENOUS
Status: COMPLETED | OUTPATIENT
Start: 2020-09-16 | End: 2020-09-16

## 2020-09-16 RX ORDER — BRIMONIDINE TARTRATE 2 MG/ML
1 SOLUTION/ DROPS OPHTHALMIC 2 TIMES DAILY
Status: DISCONTINUED | OUTPATIENT
Start: 2020-09-16 | End: 2020-09-21 | Stop reason: HOSPADM

## 2020-09-16 RX ORDER — SODIUM BICARBONATE IN D5W 150/1000ML
PLASTIC BAG, INJECTION (ML) INTRAVENOUS CONTINUOUS
Status: DISCONTINUED | OUTPATIENT
Start: 2020-09-16 | End: 2020-09-16

## 2020-09-16 RX ORDER — CLOPIDOGREL BISULFATE 75 MG/1
75 TABLET ORAL DAILY
Status: DISCONTINUED | OUTPATIENT
Start: 2020-09-16 | End: 2020-09-21 | Stop reason: HOSPADM

## 2020-09-16 RX ORDER — DEXTROSE, SODIUM CHLORIDE, AND POTASSIUM CHLORIDE 5; .45; .15 G/100ML; G/100ML; G/100ML
100 INJECTION INTRAVENOUS CONTINUOUS
Status: DISCONTINUED | OUTPATIENT
Start: 2020-09-16 | End: 2020-09-17

## 2020-09-16 RX ORDER — POTASSIUM CHLORIDE AND SODIUM CHLORIDE 450; 150 MG/100ML; MG/100ML
INJECTION, SOLUTION INTRAVENOUS CONTINUOUS
Status: DISCONTINUED | OUTPATIENT
Start: 2020-09-16 | End: 2020-09-16

## 2020-09-16 RX ORDER — DEXTROSE 50 % IN WATER (D50W) INTRAVENOUS SYRINGE
12.5-25 AS NEEDED
Status: DISCONTINUED | OUTPATIENT
Start: 2020-09-16 | End: 2020-09-19

## 2020-09-16 RX ORDER — SODIUM CHLORIDE 450 MG/100ML
150 INJECTION, SOLUTION INTRAVENOUS CONTINUOUS
Status: DISCONTINUED | OUTPATIENT
Start: 2020-09-16 | End: 2020-09-16

## 2020-09-16 RX ORDER — POTASSIUM CHLORIDE 20 MEQ/1
20 TABLET, EXTENDED RELEASE ORAL
Status: COMPLETED | OUTPATIENT
Start: 2020-09-16 | End: 2020-09-16

## 2020-09-16 RX ORDER — METOPROLOL SUCCINATE 50 MG/1
25 TABLET, EXTENDED RELEASE ORAL DAILY
Status: DISCONTINUED | OUTPATIENT
Start: 2020-09-16 | End: 2020-09-16

## 2020-09-16 RX ORDER — MAGNESIUM SULFATE 100 %
4 CRYSTALS MISCELLANEOUS AS NEEDED
Status: DISCONTINUED | OUTPATIENT
Start: 2020-09-16 | End: 2020-09-17

## 2020-09-16 RX ORDER — INSULIN LISPRO 100 [IU]/ML
INJECTION, SOLUTION INTRAVENOUS; SUBCUTANEOUS
Status: DISCONTINUED | OUTPATIENT
Start: 2020-09-16 | End: 2020-09-16

## 2020-09-16 RX ORDER — ONDANSETRON 2 MG/ML
4 INJECTION INTRAMUSCULAR; INTRAVENOUS
Status: DISCONTINUED | OUTPATIENT
Start: 2020-09-16 | End: 2020-09-19

## 2020-09-16 RX ORDER — INSULIN GLARGINE 100 [IU]/ML
15 INJECTION, SOLUTION SUBCUTANEOUS
Status: DISCONTINUED | OUTPATIENT
Start: 2020-09-16 | End: 2020-09-16

## 2020-09-16 RX ORDER — ACETAMINOPHEN 325 MG/1
650 TABLET ORAL
Status: DISCONTINUED | OUTPATIENT
Start: 2020-09-16 | End: 2020-09-21 | Stop reason: HOSPADM

## 2020-09-16 RX ORDER — ONDANSETRON 2 MG/ML
4 INJECTION INTRAMUSCULAR; INTRAVENOUS
Status: DISCONTINUED | OUTPATIENT
Start: 2020-09-16 | End: 2020-09-21 | Stop reason: HOSPADM

## 2020-09-16 RX ADMIN — HUMAN INSULIN 10 UNITS: 100 INJECTION, SOLUTION SUBCUTANEOUS at 03:54

## 2020-09-16 RX ADMIN — SODIUM CHLORIDE 1000 ML: 900 INJECTION, SOLUTION INTRAVENOUS at 04:40

## 2020-09-16 RX ADMIN — BRIMONIDINE TARTRATE 1 DROP: 2 SOLUTION OPHTHALMIC at 09:09

## 2020-09-16 RX ADMIN — SODIUM CHLORIDE 150 ML/HR: 450 INJECTION, SOLUTION INTRAVENOUS at 07:11

## 2020-09-16 RX ADMIN — HUMAN INSULIN 10 UNITS: 100 INJECTION, SOLUTION SUBCUTANEOUS at 05:08

## 2020-09-16 RX ADMIN — SODIUM CHLORIDE 5.5 UNITS/HR: 9 INJECTION, SOLUTION INTRAVENOUS at 13:52

## 2020-09-16 RX ADMIN — POTASSIUM CHLORIDE 20 MEQ: 20 TABLET, EXTENDED RELEASE ORAL at 18:21

## 2020-09-16 RX ADMIN — SODIUM CHLORIDE 1000 ML: 900 INJECTION, SOLUTION INTRAVENOUS at 05:31

## 2020-09-16 RX ADMIN — POTASSIUM CHLORIDE AND SODIUM CHLORIDE: 450; 150 INJECTION, SOLUTION INTRAVENOUS at 12:55

## 2020-09-16 RX ADMIN — CLOPIDOGREL BISULFATE 75 MG: 75 TABLET ORAL at 09:09

## 2020-09-16 RX ADMIN — DEXTROSE MONOHYDRATE, SODIUM CHLORIDE, AND POTASSIUM CHLORIDE 100 ML/HR: 50; 4.5; 1.49 INJECTION, SOLUTION INTRAVENOUS at 17:37

## 2020-09-16 RX ADMIN — BRIMONIDINE TARTRATE 1 DROP: 2 SOLUTION OPHTHALMIC at 18:21

## 2020-09-16 RX ADMIN — SODIUM CHLORIDE 1000 ML: 900 INJECTION, SOLUTION INTRAVENOUS at 03:49

## 2020-09-16 RX ADMIN — Medication: at 12:54

## 2020-09-16 RX ADMIN — ONDANSETRON 4 MG: 2 INJECTION INTRAMUSCULAR; INTRAVENOUS at 03:54

## 2020-09-16 RX ADMIN — SODIUM BICARBONATE 150 MEQ/1,000 ML IN DEXTROSE 5 % INTRAVENOUS: SOLUTION at 09:52

## 2020-09-16 RX ADMIN — Medication: at 18:21

## 2020-09-16 RX ADMIN — LEVOTHYROXINE SODIUM 175 MCG: 0.12 TABLET ORAL at 07:15

## 2020-09-16 RX ADMIN — DEXTROSE MONOHYDRATE 9.5 G: 25 INJECTION, SOLUTION INTRAVENOUS at 17:48

## 2020-09-16 RX ADMIN — SODIUM CHLORIDE 0.9 UNITS/HR: 9 INJECTION, SOLUTION INTRAVENOUS at 16:24

## 2020-09-16 RX ADMIN — SODIUM CHLORIDE 10.8 UNITS/HR: 9 INJECTION, SOLUTION INTRAVENOUS at 05:37

## 2020-09-16 RX ADMIN — SODIUM BICARBONATE 50 MEQ: 84 INJECTION, SOLUTION INTRAVENOUS at 06:35

## 2020-09-16 NOTE — PROGRESS NOTES
Problem: Falls - Risk of  Goal: *Absence of Falls  Description: Document Ashley Ramos Fall Risk and appropriate interventions in the flowsheet.   Outcome: Progressing Towards Goal  Note: Fall Risk Interventions:  Mobility Interventions: Patient to call before getting OOB, Bed/chair exit alarm              Elimination Interventions: Bed/chair exit alarm, Call light in reach, Patient to call for help with toileting needs, Toileting schedule/hourly rounds

## 2020-09-16 NOTE — PROGRESS NOTES
Reviewed chart. Discussed with nursing.     AG now 13  K 3.2  Cr lower  Gluc 344    Will stop bicarb drip, switch to 0.45 with Kcl  Repeat lactic acid later

## 2020-09-16 NOTE — WOUND CARE
Wound care consult for the gluteal cleft skin injury that was present on admission:  Chart reviewed and patient assessed today. She is here at [facility] for non-compliance with her DM meds and she has DKA and other electrolyte imbalances. Assessment of the skin: Gluteal cleft fissures x 2 openings without drainage. Zinc oxide on her gluteal cleft and this is an exceptable dressing. No need for another dressing. Pt. Moves well on demand / as needed. Recommendation: Max . Strength Desitin cream to heal and protect the fissure. Good incontinence care - pt. Currently has a Pure Wick in place. Discussed with the two RNs today.  
Tapan Torres, RN, BSN, [employer]

## 2020-09-16 NOTE — ED NOTES
Lab called with critical results BG-[id number], CO2-6, Lactic-7.3  Loyd notified this RN  Tanika Anguiano notified.

## 2020-09-16 NOTE — H&P
Hospitalist Admission Note    NAME: Ghassan Swann   :  1940   MRN:  [id number]     Date/Time:  2020 5:04 AM    Patient PCP: Arsalan Banks MD  _____________________________________________________________________  Given the patient's current clinical presentation, I have a high level of concern for decompensation if discharged from the emergency department. Complex decision making was performed, which includes reviewing the patient's available past medical records, laboratory results, and x-ray films. My assessment of this patient's clinical condition and my plan of care is as follows. Assessment / Plan:  DKA  ONEYDA  Hyperkalemia  Dehydration  Pseudohyponatremia  Lactic acidosis    Admit to PCU  Pt's DKA is likely the result of medication noncompliance  Blood gas noted - significant acidosis - expect this to improve with IV fluids and insulin  Continue IV Insulin AND Bicarb ggt for now  DKA protocol to be followed  Monitor FS q1hr  If FS drops below 250, switch from 0.45% NS to D5 1/2NS  Repeat Chem every 4 hours  Repeat LA    Lactic acidosis  Likely 2/2 dehydration  Check ua cxr to r/o infection  Hold abx for now  Leukocytosis could be from hyperglycemia. Continue to monitor. Hypothyroidism cont. levothyroxine  Severe dementia cont. Supportive care    Code Status: DNR  Surrogate Decision Maker:  DVT Prophylaxis: sq hep  GI Prophylaxis: not indicated  Baseline: Likely needs long term placement              Subjective:   CHIEF COMPLAINT:  Hyperglycemia    HISTORY OF PRESENT ILLNESS:     Ghassan Swann is a 67 y.o. female, with significant pmhx of DM,HTN, CVA dementia who presents via EMS to the ED with c/o elevated blood sugars. Pt notes she lives at home by herself and cannot state when she started feeling poorly but noted her blood sugar was over 600 prompting her to call EMS. Patient arrives with blood sugar in the high 500s per EMS and over 600 on additional readings. Patient specifically denies any associated fevers, chills, CP, SOB, nausea, vomiting, diarrhea, abd pain, changes in BM, urinary sxs, or headache. pt was previously hospitalized  with a similar presentation of DKA and dehydration at least two time before before . Social Hx: denies tobacco  denies EtOH , denies Illicit Drugs     There are no other complaints, changes, or physical findings at this time. PCP: Zach Rodriguez MD      We were asked to admit for work up and evaluation of the above problems. Past Medical History:   Diagnosis Date    Heart failure ([address]     unknown to family    Hypertension     Stroke [facility])         Past Surgical History:   Procedure Laterality Date    HX GYN      HX HEENT      thyroidectomy    HX HYSTERECTOMY      REMOVAL GALLBLADDER      THYROIDECTOMY         Social History     Tobacco Use    Smoking status: Never Smoker    Smokeless tobacco: Never Used   Substance Use Topics    Alcohol use: No     Comment: been years        Family History   Problem Relation Age of Onset    Diabetes Father     No Known Problems Mother      No Known Allergies     Prior to Admission medications    Medication Sig Start Date End Date Taking? Authorizing Provider   zinc oxide 20 % ointment Apply 1 Applicator to affected area two (2) times a day. thin layer gluteal cleft    Provider, Historical   amLODIPine (NORVASC) 10 mg tablet Take 1 Tab by mouth daily. 9/2/20   Zach Rodriguez MD   losartan-hydroCHLOROthiazide [facility]) 100-25 mg per tablet Take 1 Tab by mouth daily. Provider, Historical   insulin degludec Harriet Walpole FlexTouch U-100) 100 unit/mL (3 mL) inpn 15 Units by SubCUTAneous route daily. 8/29/20   Zach Rodriguez MD   L. acidoph & paracasei- S therm- Bifido (GALILEA-Q/RISAQUAD) 8 billion cell cap cap Take 1 Cap by mouth daily. 8/30/20   Zach Rodriguez MD   furosemide (LASIX) 20 mg tablet One tablet daily  Patient taking differently: Take 20 mg by mouth daily.  One tablet daily 8/29/20   Gilmer Cabrera MD   insulin lispro (HUMALOG) 100 unit/mL kwikpen 4 units before breakfast and lunch, 3 units before dinner  Patient taking differently: by SubCUTAneous route. 4 units before breakfast and lunch, 3 units before dinner 8/29/20   Gilmer Cabrera MD   metoprolol succinate (TOPROL-XL) 25 mg XL tablet TAKE 1 TABLET BY MOUTH EVERY DAY 5/11/20   Gilmer Cabrera MD   pravastatin (PRAVACHOL) 80 mg tablet TAKE 1 TABLET BY MOUTH at bedtime 2/27/20   Gilmer Cabrera MD   levothyroxine (SYNTHROID) 175 mcg tablet TAKE 1 TABLET BY MOUTH EVERY DAY 2/27/20   Gilmer Cabrera MD   clopidogreL (PLAVIX) 75 mg tab TAKE 1 TABLET BY MOUTH EVERY DAY 2/27/20   Gilmer Cabrera MD   flash glucose sensor (FREESTYLE KEVON 14 DAY SENSOR) kit Continuous monitoring 2/27/20   Gilmer Cabrera MD   brimonidine (ALPHAGAN) 0.15 % ophthalmic solution Administer 1 Drop to both eyes two (2) times a day. 1/30/15   Provider, Historical       REVIEW OF SYSTEMS:     I am not able to complete the review of systems because: The patient is intubated and sedated    The patient has altered mental status due to his acute medical problems    The patient has baseline aphasia from prior stroke(s)    The patient has baseline dementia and is not reliable historian    The patient is in acute medical distress and unable to provide information           Constitutional: Negative. Negative for fever. Eyes: Negative. Respiratory: Negative. Negative for shortness of breath. Cardiovascular: Negative for chest pain. Gastrointestinal: Positive for diarrhea. Negative for abdominal pain, nausea and vomiting. Endocrine: Negative. Genitourinary: Negative. Negative for difficulty urinating, dysuria and hematuria. Musculoskeletal: Negative. Skin: Negative. Neurological: Negative. Psychiatric/Behavioral: Negative for suicidal ideas. All other systems reviewed and are negative.       Objective:   VITALS: Visit Vitals  BP (!) 108/34   Pulse 91   Temp 97.4 °F (36.3 °C)   Resp 26   Ht 5' 3\" (1.6 m)   Wt 65.2 kg (143 lb 11.8 oz)   SpO2 100%   BMI 25.46 kg/m²       PHYSICAL EXAM:    Constitutional:       General: She is not in acute distress. Appearance: She is well-developed. She is not diaphoretic. HENT:      Head: Normocephalic and atraumatic. Nose: Nose normal.   Eyes:      General: No scleral icterus. Conjunctiva/sclera: Conjunctivae normal.   Neck:      Musculoskeletal: Normal range of motion. Trachea: No tracheal deviation. Cardiovascular:      Rate and Rhythm: Normal rate and regular rhythm. Heart sounds: Normal heart sounds. No murmur. No friction rub. Pulmonary:      Effort: Pulmonary effort is normal. No respiratory distress. Breath sounds: Normal breath sounds. No stridor. No wheezing or rales. Abdominal:      General: Bowel sounds are normal. There is no distension. Palpations: Abdomen is soft. Tenderness: There is no abdominal tenderness. There is no rebound. Musculoskeletal: Normal range of motion. General: No tenderness. Skin:     General: Skin is warm and dry. Findings: No rash. Neurological:      Mental Status: She is alert and oriented to person, place, and time. Cranial Nerves: No cranial nerve deficit. Psychiatric:         Speech: Speech normal.         Behavior: Behavior normal.         Thought Content:  Thought content normal.         Judgment: Judgment normal.           _______________________________________________________________________  Care Plan discussed with:    Comments   Patient y    Family      RN y    Care Manager                    Consultant:  kelly Lopez md   _______________________________________________________________________  Expected  Disposition:   Home with Family y   HH/PT/OT/RN    SNF/LTC    TAYA    ________________________________________________________________________  TOTAL TIME:  61  Minutes    Critical Care Provided     Minutes non procedure based      Comments    y Reviewed previous records   >50% of visit spent in counseling and coordination of care y Discussion with patient and/or family and questions answered       Given the patient's current clinical presentation, I have a high level of concern for decompensation if discharged from the ED. Complex decision making was performed which includes reviewing the patient's available past medical records, laboratory results, and Xray films. I have also directly communicated my plan and discussed this case with the involved ED physician.     ____________________________________________________________________  Cinda Maya MD    Procedures: see electronic medical records for all procedures/Xrays and details which were not copied into this note but were reviewed prior to creation of Plan. LAB DATA REVIEWED:    Recent Results (from the past 24 hour(s))   GLUCOSE, POC    Collection Time: 09/16/20  3:31 AM   Result Value Ref Range    Glucose (POC) >600 (HH) 65 - 100 mg/dL    Performed by Elenita Parr (RN)    CBC WITH AUTOMATED DIFF    Collection Time: 09/16/20  3:57 AM   Result Value Ref Range    WBC 12.6 (H) 3.6 - 11.0 K/uL    RBC 3.70 (L) 3.80 - 5.20 M/uL    HGB 11.6 11.5 - 16.0 g/dL    HCT 37.7 35.0 - 47.0 %    .9 (H) 80.0 - 99.0 FL    MCH 31.4 26.0 - 34.0 PG    MCHC 30.8 30.0 - 36.5 g/dL    RDW 14.8 (H) 11.5 - 14.5 %    PLATELET 550 132 - 547 K/uL    MPV 10.6 8.9 - 12.9 FL    NRBC 0.0 0  WBC    ABSOLUTE NRBC 0.00 0.00 - 0.01 K/uL    NEUTROPHILS 85 (H) 32 - 75 %    LYMPHOCYTES 10 (L) 12 - 49 %    MONOCYTES 4 (L) 5 - 13 %    EOSINOPHILS 0 0 - 7 %    BASOPHILS 0 0 - 1 %    METAMYELOCYTES 1 %    IMMATURE GRANULOCYTES 0 0.0 - 0.5 %    ABS. NEUTROPHILS 10.7 (H) 1.8 - 8.0 K/UL    ABS. LYMPHOCYTES 1.3 0.8 - 3.5 K/UL    ABS. MONOCYTES 0.5 0.0 - 1.0 K/UL    ABS. EOSINOPHILS 0.0 0.0 - 0.4 K/UL    ABS. BASOPHILS 0.0 0.0 - 0.1 K/UL    ABS. IMM.  GRANS. 0.0 0.00 - 0.04 K/UL    DF MANUAL      RBC COMMENTS NORMOCYTIC, NORMOCHROMIC     METABOLIC PANEL, COMPREHENSIVE    Collection Time: 09/16/20  3:57 AM   Result Value Ref Range    Sodium 131 (L) 136 - 145 mmol/L    Potassium 5.9 (H) 3.5 - 5.1 mmol/L    Chloride 96 (L) 97 - 108 mmol/L    CO2 6 (LL) 21 - 32 mmol/L    Anion gap 29 (H) 5 - 15 mmol/L    Glucose 867 (HH) 65 - 100 mg/dL    BUN 42 (H) 6 - 20 MG/DL    Creatinine 2.13 (H) 0.55 - 1.02 MG/DL    BUN/Creatinine ratio 20 12 - 20      GFR est AA 27 (L) >60 ml/min/1.73m2    GFR est non-AA 22 (L) >60 ml/min/1.73m2    Calcium 9.2 8.5 - 10.1 MG/DL    Bilirubin, total 0.5 0.2 - 1.0 MG/DL    ALT (SGPT) 26 12 - 78 U/L    AST (SGOT) 20 15 - 37 U/L    Alk. phosphatase 139 (H) 45 - 117 U/L    Protein, total 7.2 6.4 - 8.2 g/dL    Albumin 3.4 (L) 3.5 - 5.0 g/dL    Globulin 3.8 2.0 - 4.0 g/dL    A-G Ratio 0.9 (L) 1.1 - 2.2     SAMPLES BEING HELD    Collection Time: 09/16/20  3:57 AM   Result Value Ref Range    SAMPLES BEING HELD RD BL     COMMENT        Add-on orders for these samples will be processed based on acceptable specimen integrity and analyte stability, which may vary by analyte.    LACTIC ACID    Collection Time: 09/16/20  4:10 AM   Result Value Ref Range    Lactic acid 7.3 (HH) 0.4 - 2.0 MMOL/L   GLUCOSE, POC    Collection Time: 09/16/20  4:56 AM   Result Value Ref Range    Glucose (POC) >600 (HH) 65 - 100 mg/dL    Performed by Bebo RICHEY    GLUCOSE, POC    Collection Time: 09/16/20  4:58 AM   Result Value Ref Range    Glucose (POC) >600 (HH) 65 - 100 mg/dL    Performed by Bebo RICHEY

## 2020-09-16 NOTE — PROGRESS NOTES
Informed MD that pt is a little drowsy and lethargic. MD stated to change fluids to dextrose 5% 1/2 normal saline with 20 mEq of potassium chloride.  Will continue to monitor

## 2020-09-16 NOTE — ED PROVIDER NOTES
EMERGENCY DEPARTMENT HISTORY AND PHYSICAL EXAM     ----------------------------------------------------------------------------  Please note that this dictation was completed with [employer], the [employer] voice recognition software. Quite often unanticipated grammatical, syntax, homophones, and other interpretive errors are inadvertently transcribed by the computer software. Please disregard these errors. Please excuse any errors that have escaped final proofreading  ----------------------------------------------------------------------------      Date: 9/16/2020  Patient Name: Edmund Snyder    History of Presenting Illness     Chief Complaint   Patient presents with    High Blood Sugar     EMS states pt over 600 blood glucose. pt A&O to self. History Provided By:  Patient, EMS    HPI: Edmund Snyder is a 67 y.o. female, with significant pmhx of DM,HTN, CVA dementia who presents via EMS to the ED with c/o elevated blood sugars. Pt notes she lives at home by herself and cannot state when she started feeling poorly but noted her blood sugar was over 600 prompting her to call EMS. Patient arrives with blood sugar in the high 50s per EMS and over 600 on additional readings. Patient specifically denies any associated fevers, chills, CP, SOB, nausea, vomiting, diarrhea, abd pain, changes in BM, urinary sxs, or headache. Social Hx: denies tobacco  denies EtOH , denies Illicit Drugs    There are no other complaints, changes, or physical findings at this time.      PCP: Elizabeth Bradshaw MD    No Known Allergies    Current Facility-Administered Medications   Medication Dose Route Frequency Provider Last Rate Last Dose    ondansetron (ZOFRAN) injection 4 mg  4 mg IntraVENous Q1H PRN Yakelin Griffin MD   4 mg at 09/16/20 0354    insulin regular (NOVOLIN R, HUMULIN R) 100 Units in 0.9% sodium chloride 100 mL infusion  0-50 Units/hr IntraVENous CONTINUOUS Din, Sherwin PUENTES MD 10.8 mL/hr at 09/16/20 0537 10.8 Units/hr at 09/16/20 0537    acetaminophen (TYLENOL) tablet 650 mg  650 mg Oral Q6H PRN Sigrid Frank MD        ondansetron [facility]) injection 4 mg  4 mg IntraVENous Q4H PRN Sigrid Frank MD        brimonidine (ALPHAGAN) 0.2 % ophthalmic solution 1 Drop  1 Drop Both Eyes BID Din, Daria Pollock MD        clopidogreL (PLAVIX) tablet 75 mg  75 mg Oral DAILY Din, Daria Pollock MD        levothyroxine (SYNTHROID) tablet 175 mcg  175 mcg Oral 6am Din, Daria Pollock MD        0.45% sodium chloride infusion  125 mL/hr IntraVENous CONTINUOUS Din, Daria Pollock MD        sodium bicarbonate 8.4 % (1 mEq/mL) injection 50 mEq  50 mEq IntraVENous NOW Sigrid Frank MD        sodium bicarbonate 150 mEq/1000 mL D5W (premix)   IntraVENous CONTINUOUS Sigrid Frank MD         Current Outpatient Medications   Medication Sig Dispense Refill    zinc oxide 20 % ointment Apply 1 Applicator to affected area two (2) times a day. thin layer gluteal cleft      amLODIPine (NORVASC) 10 mg tablet Take 1 Tab by mouth daily. 30 Tab 11    losartan-hydroCHLOROthiazide (HYZAAR) 100-25 mg per tablet Take 1 Tab by mouth daily.  insulin degludec Eduardo Leyland FlexTouch U-100) 100 unit/mL (3 mL) inpn 15 Units by SubCUTAneous route daily. 2 Adjustable Dose Pre-filled Pen Syringe 11    L. acidoph & paracasei- S therm- Bifido (GALILEA-Q/RISAQUAD) 8 billion cell cap cap Take 1 Cap by mouth daily. 20 Cap 0    furosemide (LASIX) 20 mg tablet One tablet daily (Patient taking differently: Take 20 mg by mouth daily. One tablet daily) 6 Tab 0    insulin lispro (HUMALOG) 100 unit/mL kwikpen 4 units before breakfast and lunch, 3 units before dinner (Patient taking differently: by SubCUTAneous route.  4 units before breakfast and lunch, 3 units before dinner) 2 Adjustable Dose Pre-filled Pen Syringe 11    metoprolol succinate (TOPROL-XL) 25 mg XL tablet TAKE 1 TABLET BY MOUTH EVERY DAY 90 Tab 3    pravastatin (PRAVACHOL) 80 mg tablet TAKE 1 TABLET BY MOUTH at bedtime 90 Tab 3    levothyroxine (SYNTHROID) 175 mcg tablet TAKE 1 TABLET BY MOUTH EVERY DAY 90 Tab 3    clopidogreL (PLAVIX) 75 mg tab TAKE 1 TABLET BY MOUTH EVERY DAY 90 Tab 3    flash glucose sensor (FREESTYLE KEVON 14 DAY SENSOR) kit Continuous monitoring 2 Kit 11    brimonidine (ALPHAGAN) 0.15 % ophthalmic solution Administer 1 Drop to both eyes two (2) times a day. Past History     Past Medical History:  Past Medical History:   Diagnosis Date    Heart failure ([address]     unknown to family    Hypertension     Stroke [facility])        Past Surgical History:  Past Surgical History:   Procedure Laterality Date    HX GYN      HX HEENT      thyroidectomy    HX HYSTERECTOMY      REMOVAL GALLBLADDER      THYROIDECTOMY         Family History:  Family History   Problem Relation Age of Onset    Diabetes Father     No Known Problems Mother        Social History:  Social History     Tobacco Use    Smoking status: Never Smoker    Smokeless tobacco: Never Used   Substance Use Topics    Alcohol use: No     Comment: been years    Drug use: No       Allergies:  No Known Allergies      Review of Systems   Review of Systems   Constitutional: Negative. Negative for fever. Eyes: Negative. Respiratory: Negative. Negative for shortness of breath. Cardiovascular: Negative for chest pain. Gastrointestinal: Positive for diarrhea. Negative for abdominal pain, nausea and vomiting. Endocrine: Negative. Genitourinary: Negative. Negative for difficulty urinating, dysuria and hematuria. Musculoskeletal: Negative. Skin: Negative. Neurological: Negative. Psychiatric/Behavioral: Negative for suicidal ideas. All other systems reviewed and are negative. Physical Exam   Physical Exam  Vitals signs and nursing note reviewed. Constitutional:       General: She is not in acute distress. Appearance: She is well-developed. She is not diaphoretic. HENT:      Head: Normocephalic and atraumatic.       Nose: Nose normal.   Eyes:      General: No scleral icterus. Conjunctiva/sclera: Conjunctivae normal.   Neck:      Musculoskeletal: Normal range of motion. Trachea: No tracheal deviation. Cardiovascular:      Rate and Rhythm: Normal rate and regular rhythm. Heart sounds: Normal heart sounds. No murmur. No friction rub. Pulmonary:      Effort: Pulmonary effort is normal. No respiratory distress. Breath sounds: Normal breath sounds. No stridor. No wheezing or rales. Abdominal:      General: Bowel sounds are normal. There is no distension. Palpations: Abdomen is soft. Tenderness: There is no abdominal tenderness. There is no rebound. Musculoskeletal: Normal range of motion. General: No tenderness. Skin:     General: Skin is warm and dry. Findings: No rash. Neurological:      Mental Status: She is alert and oriented to person, place, and time. Cranial Nerves: No cranial nerve deficit. Psychiatric:         Speech: Speech normal.         Behavior: Behavior normal.         Thought Content:  Thought content normal.         Judgment: Judgment normal.           Diagnostic Study Results     Labs -     Recent Results (from the past 12 hour(s))   GLUCOSE, POC    Collection Time: 09/16/20  3:31 AM   Result Value Ref Range    Glucose (POC) >600 (HH) 65 - 100 mg/dL    Performed by Debra Stewart (VIN)    CBC WITH AUTOMATED DIFF    Collection Time: 09/16/20  3:57 AM   Result Value Ref Range    WBC 12.6 (H) 3.6 - 11.0 K/uL    RBC 3.70 (L) 3.80 - 5.20 M/uL    HGB 11.6 11.5 - 16.0 g/dL    HCT 37.7 35.0 - 47.0 %    .9 (H) 80.0 - 99.0 FL    MCH 31.4 26.0 - 34.0 PG    MCHC 30.8 30.0 - 36.5 g/dL    RDW 14.8 (H) 11.5 - 14.5 %    PLATELET 280 556 - 042 K/uL    MPV 10.6 8.9 - 12.9 FL    NRBC 0.0 0  WBC    ABSOLUTE NRBC 0.00 0.00 - 0.01 K/uL    NEUTROPHILS 85 (H) 32 - 75 %    LYMPHOCYTES 10 (L) 12 - 49 %    MONOCYTES 4 (L) 5 - 13 %    EOSINOPHILS 0 0 - 7 %    BASOPHILS 0 0 - 1 % METAMYELOCYTES 1 %    IMMATURE GRANULOCYTES 0 0.0 - 0.5 %    ABS. NEUTROPHILS 10.7 (H) 1.8 - 8.0 K/UL    ABS. LYMPHOCYTES 1.3 0.8 - 3.5 K/UL    ABS. MONOCYTES 0.5 0.0 - 1.0 K/UL    ABS. EOSINOPHILS 0.0 0.0 - 0.4 K/UL    ABS. BASOPHILS 0.0 0.0 - 0.1 K/UL    ABS. IMM. GRANS. 0.0 0.00 - 0.04 K/UL    DF MANUAL      RBC COMMENTS NORMOCYTIC, NORMOCHROMIC     METABOLIC PANEL, COMPREHENSIVE    Collection Time: 09/16/20  3:57 AM   Result Value Ref Range    Sodium 131 (L) 136 - 145 mmol/L    Potassium 5.9 (H) 3.5 - 5.1 mmol/L    Chloride 96 (L) 97 - 108 mmol/L    CO2 6 (LL) 21 - 32 mmol/L    Anion gap 29 (H) 5 - 15 mmol/L    Glucose 867 (HH) 65 - 100 mg/dL    BUN 42 (H) 6 - 20 MG/DL    Creatinine 2.13 (H) 0.55 - 1.02 MG/DL    BUN/Creatinine ratio 20 12 - 20      GFR est AA 27 (L) >60 ml/min/1.73m2    GFR est non-AA 22 (L) >60 ml/min/1.73m2    Calcium 9.2 8.5 - 10.1 MG/DL    Bilirubin, total 0.5 0.2 - 1.0 MG/DL    ALT (SGPT) 26 12 - 78 U/L    AST (SGOT) 20 15 - 37 U/L    Alk. phosphatase 139 (H) 45 - 117 U/L    Protein, total 7.2 6.4 - 8.2 g/dL    Albumin 3.4 (L) 3.5 - 5.0 g/dL    Globulin 3.8 2.0 - 4.0 g/dL    A-G Ratio 0.9 (L) 1.1 - 2.2     SAMPLES BEING HELD    Collection Time: 09/16/20  3:57 AM   Result Value Ref Range    SAMPLES BEING HELD RD BL     COMMENT        Add-on orders for these samples will be processed based on acceptable specimen integrity and analyte stability, which may vary by analyte.    LACTIC ACID    Collection Time: 09/16/20  4:10 AM   Result Value Ref Range    Lactic acid 7.3 (HH) 0.4 - 2.0 MMOL/L   GLUCOSE, POC    Collection Time: 09/16/20  4:56 AM   Result Value Ref Range    Glucose (POC) >600 (HH) 65 - 100 mg/dL    Performed by Darolyn Pea EDT    GLUCOSE, POC    Collection Time: 09/16/20  4:58 AM   Result Value Ref Range    Glucose (POC) >600 (HH) 65 - 100 mg/dL    Performed by Darolyn Pea EDT    GLUCOSE, POC    Collection Time: 09/16/20  5:31 AM   Result Value Ref Range    Glucose (POC) >600 (HH) 65 - 100 mg/dL    Performed by Donya Adams RN (AAS)    POC EG7    Collection Time: 09/16/20  6:16 AM   Result Value Ref Range    Calcium, ionized (POC) 1.11 (L) 1.12 - 1.32 mmol/L    pH (POC) 7.05 (LL) 7.35 - 7.45      pCO2 (POC) <15.0 (L) 35.0 - 45.0 MMHG    pO2 (POC) 82 80 - 100 MMHG    Site RIGHT RADIAL      Device: ROOM AIR      Allens test (POC) NO      Specimen type (POC) VENOUS BLOOD         Radiologic Studies -   No orders to display     CT Results  (Last 48 hours)    None        CXR Results  (Last 48 hours)    None            Medical Decision Making   I am the first provider for this patient. I reviewed the vital signs, available nursing notes, past medical history, past surgical history, family history and social history. Vital Signs-Reviewed the patient's vital signs. Patient Vitals for the past 12 hrs:   Temp Pulse Resp BP SpO2   09/16/20 0415  91 26 (!) 108/34 100 %   09/16/20 0345  97 21 (!) 126/37 96 %   09/16/20 0332 97.4 °F (36.3 °C) 96 28 107/79 100 %       Pulse Oximetry Analysis - 100% on RA    Cardiac Monitor:   Rate: 96 bpm  Rhythm: nsr      Provider Notes (Medical Decision Making):     DDX:  DKA, hyperglycemia, electrolyte abnormality, UTI, dehydration    Plan:  Labs, Accu-Chek, IV fluids, insulin    Impression:  DKA, acidosis    ED Course:   Initial assessment performed. The patients presenting problems have been discussed, and they are in agreement with the care plan formulated and outlined with them. I have encouraged them to ask questions as they arise throughout their visit. I reviewed our electronic medical record system for any past medical records that were available that may contribute to the patients current condition, the nursing notes and and vital signs from today's visit    Nursing notes will be reviewed as they become available in realtime while the pt has been in the ED.   Baudilio Harden MD    EKG interpretation 4485: NSR, l Axis, rate 91; , QRS 90, QTc 457; no acute ischemia; interpreted by Serafin Bravo MD    I personally reviewed/interpreted pt's imaging. Agree with official read by radiology as noted above. Serafin Bravo MD    PROGRESS NOTE:  Pt noted to be in DKA with elevated lactate CO2. Will continue with IV bolus insulin and then start drip. Continue with IV fluid hydration and admit to hospitalist.  Serafin Bravo MD    CONSULT NOTE:   5:04 Julia Guerra MD spoke with Dr. Veronica Palomo,   Specialty: Priddy Asper Dr. Veronica Palomo due to DKA. Discussed pt's HPI and available diagnostic results thus far. Expressed concerns for needed admission. Consultant will evaluate for admission. Serafin Bravo MD    ADMISSION NOTE:  5:04 AM  Patient is being admitted to the hospital by Dr. Veronica Palomo. The results of their tests and reasons for their admission have been discussed with them and/or available family. They convey agreement and understanding for the need to be admitted and for their admission diagnosis. Serafin Bravo MD         Critical Care Time:       CRITICAL CARE NOTE  IMPENDING DETERIORATION -Airway, Respiratory, Cardiovascular, CNS and Metabolic  ASSOCIATED RISK FACTORS - Hypotension, Dysrhythmia, Metabolic changes, Dehydration, Vascular Compromise and CNS Decompensation  MANAGEMENT- Bedside Assessment and Supervision of Care  INTERPRETATION -  Xrays, Blood Gases, ECG and Blood Pressure  INTERVENTIONS - hemodynamic mngmt, transcutaneous pacing, vascular control, Neurologic interventions  and Metobolic interventions  CASE REVIEW - Hospitalist, Nursing and Family  TREATMENT RESPONSE -Improved  PERFORMED BY - Self  NOTES   :  I have spent 125 minutes of critical care time involved in lab review, consultations with specialist, family decision- making, bedside attention and documentation excluding time spent on any separately billed procedures. During this entire length of time I was immediately available to the patient .    Serafin Bravo MD      Diagnosis     Clinical Impression:   1. Type 1 diabetes mellitus with ketoacidosis without coma ([facility] [address]        PLAN:  1. Admit to hospitalist          This note will not be viewable in [address]

## 2020-09-16 NOTE — ED NOTES
TRANSFER - OUT REPORT:    Verbal report given to Jeffery Borges RN on Sukhdeep Cones  being transferred to PCU for routine progression of care       Report consisted of patients Situation, Background, Assessment and   Recommendations(SBAR). Information from the following report(s) SBAR, ED Summary, [facility] and Recent Results was reviewed with the receiving nurse. Lines:   Peripheral IV 09/16/20 Left Wrist (Active)       Peripheral IV 09/16/20 Right Forearm (Active)   Site Assessment Clean, dry, & intact 09/16/20 0431   Phlebitis Assessment 0 09/16/20 0431   Infiltration Assessment 0 09/16/20 0431   Dressing Status Clean, dry, & intact 09/16/20 0431   Dressing Type 4 X 4;Transparent 09/16/20 0431   Hub Color/Line Status Pink;Flushed;Patent 09/16/20 0431   Action Taken Blood drawn 09/16/20 0431        Opportunity for questions and clarification was provided.

## 2020-09-16 NOTE — PROGRESS NOTES
This RN informed MD Jordan that the pt's the pts last couple of poc blood glucose checks were 169 and 140; at 1455 her pH is 7.53 CO2 22.1 pO2 57 and HCO3 18.4; at 1531 the anion gap is 6 which will be the first result less than 12; and her lactic acid was 2.5.    1533 MDTan at bedside to evaluate patient. 65  MD Cat Call stated to repeat another BMP at 1900 and if the anion gap is closed then give lantus and turn pt off drip according to policy. MD stated that lactic acid did not need to be redrawn.

## 2020-09-16 NOTE — PROGRESS NOTES
TRANSFER - IN REPORT:    Verbal report received from Roman Meza RN (name) on Geovanna Vasquez  being received from ED (unit) for routine progression of care      Report consisted of patients Situation, Background, Assessment and   Recommendations(SBAR). Information from the following report(s) SBAR, Kardex, ED Summary, Intake/Output, MAR, Recent Results and Cardiac Rhythm NSR/ST was reviewed with the receiving nurse. Opportunity for questions and clarification was provided. Assessment completed upon patients arrival to unit and care assumed. 0700: Patient arrived from ED. A&O x 2. VSS. Insulin gtt infusing. Primary Nurse Jaz Elam RN and Radha RN performed a dual skin assessment on this patient. No impairment noted. Madhu score is 18. Bed alarm on.    0745: Bedside and Verbal shift change report given to Kathie Toledo / Thelma Smith, [address] [place] [address] (oncoming nurse) by Josh Bustillo RN (offgoing nurse). Report included the following information SBAR, Kardex, ED Summary, Intake/Output, MAR, Recent Results and Cardiac Rhythm NSR.     0815: Dr Josep Marinelli paged for critical AM lab results. [id number] Josep Marinelli. No new orders received. Infuse both IVF and insulin gtt until 1000 BMP results, will reassess at that time. 0900: Debbie Paris RN made aware of MD orders.

## 2020-09-16 NOTE — PROGRESS NOTES
Bedside shift change report given to Gaurav Javier (oncoming nurse) by Bud Dubose (offgoing nurse). Report included the following information SBAR, Kardex, Intake/Output, MAR, Recent Results and Cardiac Rhythm NSR.

## 2020-09-17 LAB
ALBUMIN SERPL-MCNC: 2.5 G/DL (ref 3.5–5)
ALBUMIN/GLOB SERPL: 0.9 {RATIO} (ref 1.1–2.2)
ALP SERPL-CCNC: 88 U/L (ref 45–117)
ALT SERPL-CCNC: 17 U/L (ref 12–78)
ANION GAP SERPL CALC-SCNC: 11 MMOL/L (ref 5–15)
ANION GAP SERPL CALC-SCNC: 2 MMOL/L (ref 5–15)
AST SERPL-CCNC: 16 U/L (ref 15–37)
BASOPHILS # BLD: 0 K/UL (ref 0–0.1)
BASOPHILS NFR BLD: 0 % (ref 0–1)
BILIRUB SERPL-MCNC: 0.3 MG/DL (ref 0.2–1)
BUN SERPL-MCNC: 21 MG/DL (ref 6–20)
BUN SERPL-MCNC: 24 MG/DL (ref 6–20)
BUN/CREAT SERPL: 27 (ref 12–20)
BUN/CREAT SERPL: 31 (ref 12–20)
CALCIUM SERPL-MCNC: 7.6 MG/DL (ref 8.5–10.1)
CALCIUM SERPL-MCNC: 7.8 MG/DL (ref 8.5–10.1)
CHLORIDE SERPL-SCNC: 109 MMOL/L (ref 97–108)
CHLORIDE SERPL-SCNC: 110 MMOL/L (ref 97–108)
CO2 SERPL-SCNC: 19 MMOL/L (ref 21–32)
CO2 SERPL-SCNC: 27 MMOL/L (ref 21–32)
CREAT SERPL-MCNC: 0.77 MG/DL (ref 0.55–1.02)
CREAT SERPL-MCNC: 0.78 MG/DL (ref 0.55–1.02)
DIFFERENTIAL METHOD BLD: ABNORMAL
EOSINOPHIL # BLD: 0 K/UL (ref 0–0.4)
EOSINOPHIL NFR BLD: 0 % (ref 0–7)
ERYTHROCYTE [DISTWIDTH] IN BLOOD BY AUTOMATED COUNT: 14.7 % (ref 11.5–14.5)
GLOBULIN SER CALC-MCNC: 2.9 G/DL (ref 2–4)
GLUCOSE BLD STRIP.AUTO-MCNC: 104 MG/DL (ref 65–100)
GLUCOSE BLD STRIP.AUTO-MCNC: 119 MG/DL (ref 65–100)
GLUCOSE BLD STRIP.AUTO-MCNC: 129 MG/DL (ref 65–100)
GLUCOSE BLD STRIP.AUTO-MCNC: 135 MG/DL (ref 65–100)
GLUCOSE BLD STRIP.AUTO-MCNC: 157 MG/DL (ref 65–100)
GLUCOSE BLD STRIP.AUTO-MCNC: 158 MG/DL (ref 65–100)
GLUCOSE BLD STRIP.AUTO-MCNC: 213 MG/DL (ref 65–100)
GLUCOSE BLD STRIP.AUTO-MCNC: 221 MG/DL (ref 65–100)
GLUCOSE BLD STRIP.AUTO-MCNC: 299 MG/DL (ref 65–100)
GLUCOSE BLD STRIP.AUTO-MCNC: 301 MG/DL (ref 65–100)
GLUCOSE BLD STRIP.AUTO-MCNC: 330 MG/DL (ref 65–100)
GLUCOSE BLD STRIP.AUTO-MCNC: 87 MG/DL (ref 65–100)
GLUCOSE BLD STRIP.AUTO-MCNC: 89 MG/DL (ref 65–100)
GLUCOSE BLD STRIP.AUTO-MCNC: 97 MG/DL (ref 65–100)
GLUCOSE SERPL-MCNC: 195 MG/DL (ref 65–100)
GLUCOSE SERPL-MCNC: 74 MG/DL (ref 65–100)
HCT VFR BLD AUTO: 32.4 % (ref 35–47)
HGB BLD-MCNC: 11.1 G/DL (ref 11.5–16)
IMM GRANULOCYTES # BLD AUTO: 0 K/UL (ref 0–0.04)
IMM GRANULOCYTES NFR BLD AUTO: 0 % (ref 0–0.5)
LYMPHOCYTES # BLD: 0.9 K/UL (ref 0.8–3.5)
LYMPHOCYTES NFR BLD: 9 % (ref 12–49)
MAGNESIUM SERPL-MCNC: 1.9 MG/DL (ref 1.6–2.4)
MCH RBC QN AUTO: 31.7 PG (ref 26–34)
MCHC RBC AUTO-ENTMCNC: 34.3 G/DL (ref 30–36.5)
MCV RBC AUTO: 92.6 FL (ref 80–99)
MONOCYTES # BLD: 0.9 K/UL (ref 0–1)
MONOCYTES NFR BLD: 9 % (ref 5–13)
NEUTS SEG # BLD: 8.2 K/UL (ref 1.8–8)
NEUTS SEG NFR BLD: 82 % (ref 32–75)
NRBC # BLD: 0 K/UL (ref 0–0.01)
NRBC BLD-RTO: 0 PER 100 WBC
PHOSPHATE SERPL-MCNC: 2.1 MG/DL (ref 2.6–4.7)
PLATELET # BLD AUTO: 304 K/UL (ref 150–400)
PMV BLD AUTO: 10 FL (ref 8.9–12.9)
POTASSIUM SERPL-SCNC: 3.9 MMOL/L (ref 3.5–5.1)
POTASSIUM SERPL-SCNC: 3.9 MMOL/L (ref 3.5–5.1)
PROT SERPL-MCNC: 5.4 G/DL (ref 6.4–8.2)
RBC # BLD AUTO: 3.5 M/UL (ref 3.8–5.2)
SERVICE CMNT-IMP: ABNORMAL
SERVICE CMNT-IMP: NORMAL
SODIUM SERPL-SCNC: 139 MMOL/L (ref 136–145)
SODIUM SERPL-SCNC: 139 MMOL/L (ref 136–145)
WBC # BLD AUTO: 10 K/UL (ref 3.6–11)

## 2020-09-17 PROCEDURE — 65660000000 HC RM CCU STEPDOWN

## 2020-09-17 PROCEDURE — 82962 GLUCOSE BLOOD TEST: CPT

## 2020-09-17 PROCEDURE — 74011250637 HC RX REV CODE- 250/637: Performed by: HOSPITALIST

## 2020-09-17 PROCEDURE — 85025 COMPLETE CBC W/AUTO DIFF WBC: CPT

## 2020-09-17 PROCEDURE — 36415 COLL VENOUS BLD VENIPUNCTURE: CPT

## 2020-09-17 PROCEDURE — 74011000258 HC RX REV CODE- 258: Performed by: INTERNAL MEDICINE

## 2020-09-17 PROCEDURE — 74011636637 HC RX REV CODE- 636/637: Performed by: HOSPITALIST

## 2020-09-17 PROCEDURE — 3331090001 HH PPS REVENUE CREDIT

## 2020-09-17 PROCEDURE — 3331090002 HH PPS REVENUE DEBIT

## 2020-09-17 PROCEDURE — 84100 ASSAY OF PHOSPHORUS: CPT

## 2020-09-17 PROCEDURE — 80053 COMPREHEN METABOLIC PANEL: CPT

## 2020-09-17 PROCEDURE — 74011000258 HC RX REV CODE- 258: Performed by: HOSPITALIST

## 2020-09-17 PROCEDURE — 74011250636 HC RX REV CODE- 250/636: Performed by: INTERNAL MEDICINE

## 2020-09-17 PROCEDURE — 2709999900 HC NON-CHARGEABLE SUPPLY

## 2020-09-17 PROCEDURE — 74011636637 HC RX REV CODE- 636/637: Performed by: INTERNAL MEDICINE

## 2020-09-17 PROCEDURE — 83735 ASSAY OF MAGNESIUM: CPT

## 2020-09-17 RX ORDER — DEXTROSE 50 % IN WATER (D50W) INTRAVENOUS SYRINGE
25-50 AS NEEDED
Status: DISCONTINUED | OUTPATIENT
Start: 2020-09-17 | End: 2020-09-21 | Stop reason: HOSPADM

## 2020-09-17 RX ORDER — INSULIN LISPRO 100 [IU]/ML
4 INJECTION, SOLUTION INTRAVENOUS; SUBCUTANEOUS
Status: DISCONTINUED | OUTPATIENT
Start: 2020-09-17 | End: 2020-09-19

## 2020-09-17 RX ORDER — INSULIN GLARGINE 100 [IU]/ML
24 INJECTION, SOLUTION SUBCUTANEOUS DAILY
Status: DISCONTINUED | OUTPATIENT
Start: 2020-09-18 | End: 2020-09-18

## 2020-09-17 RX ORDER — MAGNESIUM SULFATE 100 %
4 CRYSTALS MISCELLANEOUS AS NEEDED
Status: DISCONTINUED | OUTPATIENT
Start: 2020-09-17 | End: 2020-09-21 | Stop reason: HOSPADM

## 2020-09-17 RX ORDER — INSULIN GLARGINE 100 [IU]/ML
24 INJECTION, SOLUTION SUBCUTANEOUS
Status: COMPLETED | OUTPATIENT
Start: 2020-09-17 | End: 2020-09-17

## 2020-09-17 RX ADMIN — BRIMONIDINE TARTRATE 1 DROP: 2 SOLUTION OPHTHALMIC at 09:48

## 2020-09-17 RX ADMIN — SODIUM CHLORIDE 1.2 UNITS/HR: 9 INJECTION, SOLUTION INTRAVENOUS at 10:54

## 2020-09-17 RX ADMIN — BRIMONIDINE TARTRATE 1 DROP: 2 SOLUTION OPHTHALMIC at 17:04

## 2020-09-17 RX ADMIN — CLOPIDOGREL BISULFATE 75 MG: 75 TABLET ORAL at 09:46

## 2020-09-17 RX ADMIN — INSULIN GLARGINE 24 UNITS: 100 INJECTION, SOLUTION SUBCUTANEOUS at 09:52

## 2020-09-17 RX ADMIN — Medication: at 09:48

## 2020-09-17 RX ADMIN — LEVOTHYROXINE SODIUM 175 MCG: 0.12 TABLET ORAL at 05:52

## 2020-09-17 RX ADMIN — DEXTROSE MONOHYDRATE, SODIUM CHLORIDE, AND POTASSIUM CHLORIDE 100 ML/HR: 50; 4.5; 1.49 INJECTION, SOLUTION INTRAVENOUS at 04:25

## 2020-09-17 RX ADMIN — INSULIN LISPRO 4 UNITS: 100 INJECTION, SOLUTION INTRAVENOUS; SUBCUTANEOUS at 17:04

## 2020-09-17 RX ADMIN — POTASSIUM CHLORIDE: 2 INJECTION, SOLUTION, CONCENTRATE INTRAVENOUS at 09:46

## 2020-09-17 RX ADMIN — POTASSIUM CHLORIDE: 2 INJECTION, SOLUTION, CONCENTRATE INTRAVENOUS at 18:23

## 2020-09-17 RX ADMIN — SODIUM CHLORIDE 4.6 UNITS/HR: 9 INJECTION, SOLUTION INTRAVENOUS at 08:33

## 2020-09-17 RX ADMIN — Medication: at 17:04

## 2020-09-17 NOTE — PROGRESS NOTES
Bedside shift change report given to Radha (oncoming nurse) by Joey Boone (offgoing nurse). Report included the following information SBAR, Kardex, Intake/Output, MAR, Recent Results and Cardiac Rhythm NSR.

## 2020-09-17 NOTE — PROGRESS NOTES
Problem: Falls - Risk of  Goal: *Absence of Falls  Description: Document Rawland Stade Fall Risk and appropriate interventions in the flowsheet. Outcome: Progressing Towards Goal  Note: Fall Risk Interventions:  Mobility Interventions: Bed/chair exit alarm, Patient to call before getting OOB              Elimination Interventions: Bed/chair exit alarm, Call light in reach, Patient to call for help with toileting needs, Toileting schedule/hourly rounds              Problem: Pressure Injury - Risk of  Goal: *Prevention of pressure injury  Description: Document Madhu Scale and appropriate interventions in the flowsheet. Outcome: Progressing Towards Goal  Note: Pressure Injury Interventions:       Moisture Interventions: Absorbent underpads, Check for incontinence Q2 hours and as needed, Internal/External urinary devices, Apply protective barrier, creams and emollients    Activity Interventions: Increase time out of bed, Pressure redistribution bed/mattress(bed type), PT/OT evaluation    Mobility Interventions: HOB 30 degrees or less, PT/OT evaluation, Turn and reposition approx.  every two hours(pillow and wedges)    Nutrition Interventions: Document food/fluid/supplement intake    Friction and Shear Interventions: Apply protective barrier, creams and emollients, HOB 30 degrees or less, Lift sheet

## 2020-09-17 NOTE — PROGRESS NOTES
Reason for Readmission:     DKI       RUR Score/Risk Level:     50 High, 5th IP admission  PCP: First and Last name:  Rudi Gil   Name of Practice: [facility]   Are you a current patient: Yes/No: Yes Approximate date of last visit: 7/28/2020   Can you participate in a virtual visit with your PCP: Yes  Is a Care Conference indicated:  IDR will discuss      Did you attend your follow up appointment (s): If not, why not:  No, [facility] H[facility] started, I didn't have an appointment. Resources/supports as identified by patient/family: 2 sons and   Daughter Maci Delgado . Top Challenges facing patient (as identified by patient/family and CM): Finances/Medication cost?   Not an issues, Pt has medicaid and medicare    Transportation   Family provide transportation for appointments, Has medicaid benefits     Support system or lack thereof? Family     Living arrangements? Living with her 2 son's in a 3 level home. Self-care/ADLs/Cognition? Dependent, need personal care. Current Advanced Directive/Advance Care Plan:  DNR, not listed in chart. Plan for utilizing home health:  Open with [facility],need a resumption order upon discharge. Transition of Care Plan:    Based on readmission, the patient's previous Plan of [address] with [facility]   has been evaluated and/or modified. The current Transition of Care Plan is: Home with [address] Management Interventions  PCP Verified by CM: Yes(7/28/2020 last visit)  Mode of Transport at Discharge: Self(Family will transport)  Transition of Care Consult (CM Consult): Home Health, Discharge Planning([facility] is open.  Need resumption order upon d/c)  [address] Yes  Discharge Durable Medical Equipment: No  Current Support Network: Lives with Caregiver(Pt lives with her two son's in a 3 level home ,one son has intellectual disability.)  Confirm Follow Up Transport: Family  Discharge Location  Discharge Placement: Home with home health(Need personal care support)    Readmission Assessment  Number of days since last admission?: 8-30 days  Previous disposition: Home with Home Health  Who is being interviewed?: Caregiver  What was the patient's/caregiver's perception as to why they think they needed to return back to the hospital?: Did not realize care needs would be so extensive  Did you visit your Primary Care Physician after you left the hospital, before you returned this time?: No( services started from [facility])  Why weren't you able to visit your PCP?: Did not have an appointment  Did you see a specialist, such as Cardiac, Pulmonary, Orthopedic Physician, etc. after you left the hospital?: No  Who advised the patient to return to the hospital?: [facility] Staff  Does the patient report anything that got in the way of taking their medications?: No  In our efforts to provide the best possible care to you and others like you, can you think of anything that we could have done to help you after you left the hospital the first time, so that you might not have needed to return so soon?: Arrange for more help when leaving the hospital, Additional Community resources available for illness support, Education on how to continue taking medications upon discharge    Cm spoke to pt's daughter and she informed that  she was quarantine, so she couldn't go and see mother. Family is waiting call from a personal care agency the patient need personal care which help her to comply with her medications and food. Pt is living with her 2 son's and one son has ID. Floor cm contact information is provided to [address]. Encouraged daughter to contact [employer] to find her CM and CCM services.     Kwesi Perez MSW  ED Case Manager   Ext [phone]

## 2020-09-17 NOTE — PROGRESS NOTES
General Daily Progress Note    Admit Date: 9/16/2020    Subjective:     Patient has no complaint . Fanny Zamora Current Facility-Administered Medications   Medication Dose Route Frequency    glucose chewable tablet 16 g  4 Tab Oral PRN    dextrose (D50W) injection syrg 12.5-25 g  25-50 mL IntraVENous PRN    glucagon (GLUCAGEN) injection 1 mg  1 mg IntraMUSCular PRN    insulin lispro (HUMALOG) injection 4 Units  4 Units SubCUTAneous TIDAC    0.45% sodium chloride 1,000 mL with potassium chloride 30 mEq infusion   IntraVENous CONTINUOUS    ondansetron (ZOFRAN) injection 4 mg  4 mg IntraVENous Q1H PRN    insulin regular (NOVOLIN R, HUMULIN R) 100 Units in 0.9% sodium chloride 100 mL infusion  0-50 Units/hr IntraVENous CONTINUOUS    acetaminophen (TYLENOL) tablet 650 mg  650 mg Oral Q6H PRN    ondansetron (ZOFRAN) injection 4 mg  4 mg IntraVENous Q4H PRN    brimonidine (ALPHAGAN) 0.2 % ophthalmic solution 1 Drop  1 Drop Both Eyes BID    clopidogreL (PLAVIX) tablet 75 mg  75 mg Oral DAILY    levothyroxine (SYNTHROID) tablet 175 mcg  175 mcg Oral 6am    zinc oxide-cod liver oil (DESITIN) 40 % paste   Topical BID    dextrose (D50W) injection syrg 12.5-25 g  12.5-25 g IntraVENous PRN        Review of Systems  A comprehensive review of systems was negative. Objective:     Patient Vitals for the past 24 hrs:   BP Temp Pulse Resp SpO2   09/17/20 1101 (!) 93/41       09/17/20 1026 (!) 82/50 99.5 °F (37.5 °C) 82 20 100 %   09/17/20 0729 (!) 127/48 98.1 °F (36.7 °C) 84 16 100 %   09/17/20 0315 (!) 124/42 98.9 °F (37.2 °C) 83 18 100 %   09/16/20 2245 (!) 116/39 98.6 °F (37 °C) 80 18 100 %   09/16/20 1908 (!) 118/39 98.7 °F (37.1 °C) 79 18 100 %   09/16/20 1508 (!) 124/47 98 °F (36.7 °C) 84 18 100 %   09/16/20 1149 (!) 132/40 97.7 °F (36.5 °C) 78 20 100 %     09/17 0701 - 09/17 1900  In: 440 [P.O.:440]  Out: -   09/15 1901 - 09/17 0700  In: 4848.3 [P.O.:840;  I.V.:4008.3]  Out: 1000 [Urine:1000]    Physical Exam: Visit Vitals  BP (!) 93/41 (BP 1 Location: Left arm, BP Patient Position: At rest)   Pulse 82   Temp 99.5 °F (37.5 °C)   Resp 20   Ht 5' 3\" (1.6 m)   Wt 143 lb 11.8 oz (65.2 kg)   SpO2 100%   BMI 25.46 kg/m²     General appearance: alert, cooperative, no distress, appears stated age  Neck: supple, symmetrical, trachea midline, no adenopathy, thyroid: not enlarged, symmetric, no tenderness/mass/nodules, no carotid bruit and no JVD  Lungs: clear to auscultation bilaterally  Heart: regular rate and rhythm, S1, S2 normal, no murmur, click, rub or gallop  Abdomen: soft, non-tender. Bowel sounds normal. No masses,  no organomegaly  Extremities: extremities normal, atraumatic, no cyanosis or edema    Assessment:     Active Problems:    Dehydration (9/16/2020)      Diabetic keto-acidosis ([facility] [address] (9/16/2020)      Lactic acid acidosis (9/16/2020)      ONEYDA (acute kidney injury) ([facility] [address] (9/16/2020)        Plan:     1. DKA for the most part has resolved. CO2 was 19 but awaiting repeat BMP. Insulin drip discontinued with resumption in basal bolus preparation. 2.  Continue hydration. Caloric intake appears to be reasonable also.

## 2020-09-17 NOTE — PROGRESS NOTES
Bedside and Verbal shift change report given to Tcio Stock RN (oncoming nurse) by Meng Liu / VIN Inman (offgoing nurse). Report included the following information SBAR, Kardex, ED Summary, Intake/Output, MAR, Recent Results and Cardiac Rhythm NSR.     1930: Report received. Assessment completed. Pt A&O x 2. VSS. NSR on telemetry. External catheter in place. Patient denies complaints. Bed alarm on. Call bell in reach. 2110: Patient incontinent of stool. Incontinent care completed. Patient repositioned to comfort. 2125: MD paged regarding lantus dose as per policy/protocol insulin gtt not typically transitioned off overnight. 2130: MD to D/C lantus order and continue insulin gtt & IVF throughout shift. 0315: AM labs drawn. VSS. Patient more confused. Bed alarm on. Call bell in reach. 0715: Bedside and Verbal shift change report given to Johnnie Llanes RN (oncoming nurse) by Tico Stock RN (offgoing nurse). Report included the following information SBAR, Kardex, ED Summary, Intake/Output, MAR, Recent Results and Cardiac Rhythm NSR.

## 2020-09-18 ENCOUNTER — HOME CARE VISIT (OUTPATIENT)
Dept: SCHEDULING | Facility: HOME HEALTH | Age: 80
End: 2020-09-18
Payer: MEDICARE

## 2020-09-18 LAB
ANION GAP SERPL CALC-SCNC: 6 MMOL/L (ref 5–15)
BUN SERPL-MCNC: 11 MG/DL (ref 6–20)
BUN/CREAT SERPL: 18 (ref 12–20)
CALCIUM SERPL-MCNC: 7.5 MG/DL (ref 8.5–10.1)
CHLORIDE SERPL-SCNC: 108 MMOL/L (ref 97–108)
CO2 SERPL-SCNC: 23 MMOL/L (ref 21–32)
CREAT SERPL-MCNC: 0.61 MG/DL (ref 0.55–1.02)
GLUCOSE BLD STRIP.AUTO-MCNC: 100 MG/DL (ref 65–100)
GLUCOSE BLD STRIP.AUTO-MCNC: 112 MG/DL (ref 65–100)
GLUCOSE BLD STRIP.AUTO-MCNC: 127 MG/DL (ref 65–100)
GLUCOSE BLD STRIP.AUTO-MCNC: 162 MG/DL (ref 65–100)
GLUCOSE BLD STRIP.AUTO-MCNC: 226 MG/DL (ref 65–100)
GLUCOSE BLD STRIP.AUTO-MCNC: 45 MG/DL (ref 65–100)
GLUCOSE BLD STRIP.AUTO-MCNC: 51 MG/DL (ref 65–100)
GLUCOSE BLD STRIP.AUTO-MCNC: 51 MG/DL (ref 65–100)
GLUCOSE SERPL-MCNC: 86 MG/DL (ref 65–100)
POTASSIUM SERPL-SCNC: 4 MMOL/L (ref 3.5–5.1)
SERVICE CMNT-IMP: ABNORMAL
SERVICE CMNT-IMP: NORMAL
SODIUM SERPL-SCNC: 137 MMOL/L (ref 136–145)

## 2020-09-18 PROCEDURE — 3331090001 HH PPS REVENUE CREDIT

## 2020-09-18 PROCEDURE — 36415 COLL VENOUS BLD VENIPUNCTURE: CPT

## 2020-09-18 PROCEDURE — 74011636637 HC RX REV CODE- 636/637: Performed by: INTERNAL MEDICINE

## 2020-09-18 PROCEDURE — 74011250636 HC RX REV CODE- 250/636: Performed by: INTERNAL MEDICINE

## 2020-09-18 PROCEDURE — 74011000258 HC RX REV CODE- 258: Performed by: INTERNAL MEDICINE

## 2020-09-18 PROCEDURE — 65660000000 HC RM CCU STEPDOWN

## 2020-09-18 PROCEDURE — 82962 GLUCOSE BLOOD TEST: CPT

## 2020-09-18 PROCEDURE — 80048 BASIC METABOLIC PNL TOTAL CA: CPT

## 2020-09-18 PROCEDURE — 2709999900 HC NON-CHARGEABLE SUPPLY

## 2020-09-18 PROCEDURE — 74011250637 HC RX REV CODE- 250/637: Performed by: HOSPITALIST

## 2020-09-18 PROCEDURE — 3331090002 HH PPS REVENUE DEBIT

## 2020-09-18 RX ORDER — INSULIN GLARGINE 100 [IU]/ML
20 INJECTION, SOLUTION SUBCUTANEOUS DAILY
Status: DISCONTINUED | OUTPATIENT
Start: 2020-09-18 | End: 2020-09-19

## 2020-09-18 RX ADMIN — POTASSIUM CHLORIDE: 2 INJECTION, SOLUTION, CONCENTRATE INTRAVENOUS at 07:45

## 2020-09-18 RX ADMIN — LEVOTHYROXINE SODIUM 175 MCG: 0.12 TABLET ORAL at 06:04

## 2020-09-18 RX ADMIN — INSULIN LISPRO 4 UNITS: 100 INJECTION, SOLUTION INTRAVENOUS; SUBCUTANEOUS at 13:12

## 2020-09-18 RX ADMIN — CLOPIDOGREL BISULFATE 75 MG: 75 TABLET ORAL at 09:06

## 2020-09-18 RX ADMIN — Medication: at 17:08

## 2020-09-18 RX ADMIN — BRIMONIDINE TARTRATE 1 DROP: 2 SOLUTION OPHTHALMIC at 17:07

## 2020-09-18 RX ADMIN — Medication: at 09:07

## 2020-09-18 RX ADMIN — INSULIN LISPRO 4 UNITS: 100 INJECTION, SOLUTION INTRAVENOUS; SUBCUTANEOUS at 17:07

## 2020-09-18 RX ADMIN — BRIMONIDINE TARTRATE 1 DROP: 2 SOLUTION OPHTHALMIC at 09:07

## 2020-09-18 RX ADMIN — INSULIN GLARGINE 20 UNITS: 100 INJECTION, SOLUTION SUBCUTANEOUS at 09:06

## 2020-09-18 NOTE — PROGRESS NOTES
1944 Bedside shift change report given to Oscar Mena (oncoming nurse) by Debbie Paris (offgoing nurse). Report included the following information SBAR, Kardex, Procedure Summary, MAR and Recent Results.

## 2020-09-18 NOTE — PROGRESS NOTES
0740 Patients blood sugar 51 this morning. Orange juice given. Ate breakfast. Blood sugar up to 100.     0915 Patient assisted to chair. Resting comfortably. Chair alarm on and call bell in reach    0945: Patient assisted to [facility] to void. Back in chair, alarm on, talking to daughter on phone. 1130 Patient back in bed, wants to take a nap. All needs met at this time. Bed alarm on and call bell in reach     1300: Patient up in chair eating lunch. Good appetite. Chair alarm on and call bell in reach     1700: Patient up in chair eating dinner. All needs met at this time. Chair alarm on and call bell in reach.

## 2020-09-18 NOTE — PROGRESS NOTES
General Daily Progress Note    Admit Date: 9/16/2020    Subjective:     Patient has no complaint . Blaire Pass Current Facility-Administered Medications   Medication Dose Route Frequency    insulin glargine (LANTUS) injection 20 Units  20 Units SubCUTAneous DAILY    glucose chewable tablet 16 g  4 Tab Oral PRN    dextrose (D50W) injection syrg 12.5-25 g  25-50 mL IntraVENous PRN    glucagon (GLUCAGEN) injection 1 mg  1 mg IntraMUSCular PRN    insulin lispro (HUMALOG) injection 4 Units  4 Units SubCUTAneous TIDAC    0.45% sodium chloride 1,000 mL with potassium chloride 30 mEq infusion   IntraVENous CONTINUOUS    ondansetron (ZOFRAN) injection 4 mg  4 mg IntraVENous Q1H PRN    acetaminophen (TYLENOL) tablet 650 mg  650 mg Oral Q6H PRN    ondansetron (ZOFRAN) injection 4 mg  4 mg IntraVENous Q4H PRN    brimonidine (ALPHAGAN) 0.2 % ophthalmic solution 1 Drop  1 Drop Both Eyes BID    clopidogreL (PLAVIX) tablet 75 mg  75 mg Oral DAILY    levothyroxine (SYNTHROID) tablet 175 mcg  175 mcg Oral 6am    zinc oxide-cod liver oil (DESITIN) 40 % paste   Topical BID    dextrose (D50W) injection syrg 12.5-25 g  12.5-25 g IntraVENous PRN        Review of Systems  A comprehensive review of systems was negative. Objective:     Patient Vitals for the past 24 hrs:   BP Temp Pulse Resp SpO2   09/18/20 0726 (!) 142/53 97.9 °F (36.6 °C) 73 16 100 %   09/18/20 0711 (!) 140/57  74     09/18/20 0337 98/83 98 °F (36.7 °C) 77 16 100 %   09/17/20 2313 (!) 128/59 98.3 °F (36.8 °C) 84 16 100 %   09/17/20 1916 120/62 98.7 °F (37.1 °C) 84 16 100 %   09/17/20 1522 (!) 120/45 98.7 °F (37.1 °C) 75 16 100 %   09/17/20 1101 (!) 93/41       09/17/20 1026 (!) 82/50 99.5 °F (37.5 °C) 82 20 100 %     No intake/output data recorded.   09/16 1901 - 09/18 0700  In: 3915 [P.O.:960; I.V.:2380]  Out: 1220 [Urine:1220]    Physical Exam:   Visit Vitals  BP (!) 142/53 (BP 1 Location: Right arm, BP Patient Position: At rest)   Pulse 73   Temp 97.9 °F (36.6 °C)   Resp 16   Ht 5' 3\" (1.6 m)   Wt 143 lb 11.8 oz (65.2 kg)   SpO2 100%   BMI 25.46 kg/m²     General appearance: alert, cooperative, no distress, appears stated age  Neck: supple, symmetrical, trachea midline, no adenopathy, thyroid: not enlarged, symmetric, no tenderness/mass/nodules, no carotid bruit and no JVD  Lungs: clear to auscultation bilaterally  Heart: regular rate and rhythm, S1, S2 normal, no murmur, click, rub or gallop  Abdomen: soft, non-tender. Bowel sounds normal. No masses,  no organomegaly  Extremities: extremities normal, atraumatic, no cyanosis or edema    Assessment:     Active Problems:    Dehydration (9/16/2020)      Diabetic keto-acidosis ([facility] [address] (9/16/2020)      Lactic acid acidosis (9/16/2020)      ONEYDA (acute kidney injury) ([facility] [address] (9/16/2020)        Plan:     1. DKA resolved. Continue diabetic control. Patient has hypoglycemic unawareness. Anticipate discharge if all goes well with blood sugars today. 2.  Hydration is adequate. 3.  Will mobilize.

## 2020-09-18 NOTE — PROGRESS NOTES
1900: Shift report given by Demi Arango RN   0300: Pt had an uneventful night. Has not slept much. Mild confusion, easily redirectable . VSS.     0417: Lab called. Pt blood sugar low( 46). Pt had been given snack and ginger ale  prior to receiving call from lab. Will let Pt finish snack before doing FSBS.     0439: Pt FSBS 62. Pt requested milk and binh crackers. Pt given milk binh crackers and orange  juice. Pt told to drink juice first and then she can have her milk. Will recheck FSBS in 15 minutes Pt remains Alert and oriented, no signs or symptoms at this time. 7350: Blood sugar 82. Pt reminded to always let me know when they feel BS is getting low. Pt did have a snack before bed to help prevent hypoglycemia, will follow up with day shift nurse about another plan of care regarding the matter  for the future.      0700: updates given to VIN Lackey

## 2020-09-19 LAB
ANION GAP SERPL CALC-SCNC: 4 MMOL/L (ref 5–15)
BUN SERPL-MCNC: 10 MG/DL (ref 6–20)
BUN/CREAT SERPL: 19 (ref 12–20)
CALCIUM SERPL-MCNC: 8.2 MG/DL (ref 8.5–10.1)
CHLORIDE SERPL-SCNC: 109 MMOL/L (ref 97–108)
CO2 SERPL-SCNC: 25 MMOL/L (ref 21–32)
CREAT SERPL-MCNC: 0.52 MG/DL (ref 0.55–1.02)
GLUCOSE BLD STRIP.AUTO-MCNC: 109 MG/DL (ref 65–100)
GLUCOSE BLD STRIP.AUTO-MCNC: 225 MG/DL (ref 65–100)
GLUCOSE BLD STRIP.AUTO-MCNC: 38 MG/DL (ref 65–100)
GLUCOSE BLD STRIP.AUTO-MCNC: 42 MG/DL (ref 65–100)
GLUCOSE BLD STRIP.AUTO-MCNC: 48 MG/DL (ref 65–100)
GLUCOSE BLD STRIP.AUTO-MCNC: 62 MG/DL (ref 65–100)
GLUCOSE BLD STRIP.AUTO-MCNC: 82 MG/DL (ref 65–100)
GLUCOSE BLD STRIP.AUTO-MCNC: 85 MG/DL (ref 65–100)
GLUCOSE BLD STRIP.AUTO-MCNC: 98 MG/DL (ref 65–100)
GLUCOSE SERPL-MCNC: 46 MG/DL (ref 65–100)
POTASSIUM SERPL-SCNC: 4.2 MMOL/L (ref 3.5–5.1)
SERVICE CMNT-IMP: ABNORMAL
SERVICE CMNT-IMP: NORMAL
SODIUM SERPL-SCNC: 138 MMOL/L (ref 136–145)

## 2020-09-19 PROCEDURE — 82962 GLUCOSE BLOOD TEST: CPT

## 2020-09-19 PROCEDURE — 80048 BASIC METABOLIC PNL TOTAL CA: CPT

## 2020-09-19 PROCEDURE — 36415 COLL VENOUS BLD VENIPUNCTURE: CPT

## 2020-09-19 PROCEDURE — 74011000258 HC RX REV CODE- 258: Performed by: INTERNAL MEDICINE

## 2020-09-19 PROCEDURE — 74011250636 HC RX REV CODE- 250/636: Performed by: INTERNAL MEDICINE

## 2020-09-19 PROCEDURE — 3331090001 HH PPS REVENUE CREDIT

## 2020-09-19 PROCEDURE — 74011250637 HC RX REV CODE- 250/637: Performed by: EMERGENCY MEDICINE

## 2020-09-19 PROCEDURE — 74011250637 HC RX REV CODE- 250/637: Performed by: HOSPITALIST

## 2020-09-19 PROCEDURE — 3331090002 HH PPS REVENUE DEBIT

## 2020-09-19 PROCEDURE — 65660000000 HC RM CCU STEPDOWN

## 2020-09-19 PROCEDURE — 74011636637 HC RX REV CODE- 636/637: Performed by: INTERNAL MEDICINE

## 2020-09-19 RX ORDER — INSULIN LISPRO 100 [IU]/ML
4 INJECTION, SOLUTION INTRAVENOUS; SUBCUTANEOUS
Status: DISCONTINUED | OUTPATIENT
Start: 2020-09-20 | End: 2020-09-20

## 2020-09-19 RX ORDER — INSULIN LISPRO 100 [IU]/ML
2 INJECTION, SOLUTION INTRAVENOUS; SUBCUTANEOUS
Status: DISCONTINUED | OUTPATIENT
Start: 2020-09-19 | End: 2020-09-20

## 2020-09-19 RX ORDER — INSULIN GLARGINE 100 [IU]/ML
16 INJECTION, SOLUTION SUBCUTANEOUS DAILY
Status: DISCONTINUED | OUTPATIENT
Start: 2020-09-20 | End: 2020-09-20

## 2020-09-19 RX ADMIN — BRIMONIDINE TARTRATE 1 DROP: 2 SOLUTION OPHTHALMIC at 17:13

## 2020-09-19 RX ADMIN — INSULIN LISPRO 2 UNITS: 100 INJECTION, SOLUTION INTRAVENOUS; SUBCUTANEOUS at 12:17

## 2020-09-19 RX ADMIN — CLOPIDOGREL BISULFATE 75 MG: 75 TABLET ORAL at 08:29

## 2020-09-19 RX ADMIN — INSULIN GLARGINE 20 UNITS: 100 INJECTION, SOLUTION SUBCUTANEOUS at 08:29

## 2020-09-19 RX ADMIN — ACETAMINOPHEN 650 MG: 325 TABLET ORAL at 23:47

## 2020-09-19 RX ADMIN — INSULIN LISPRO 4 UNITS: 100 INJECTION, SOLUTION INTRAVENOUS; SUBCUTANEOUS at 08:29

## 2020-09-19 RX ADMIN — INSULIN LISPRO 2 UNITS: 100 INJECTION, SOLUTION INTRAVENOUS; SUBCUTANEOUS at 17:13

## 2020-09-19 RX ADMIN — BRIMONIDINE TARTRATE 1 DROP: 2 SOLUTION OPHTHALMIC at 08:30

## 2020-09-19 RX ADMIN — Medication: at 17:13

## 2020-09-19 RX ADMIN — POTASSIUM CHLORIDE: 2 INJECTION, SOLUTION, CONCENTRATE INTRAVENOUS at 00:18

## 2020-09-19 RX ADMIN — LEVOTHYROXINE SODIUM 175 MCG: 0.12 TABLET ORAL at 07:21

## 2020-09-19 RX ADMIN — Medication: at 08:30

## 2020-09-19 NOTE — PROGRESS NOTES
0700 Bedside shift change report given to VIN Khan and Ciho Delacruz RN (oncoming nurse) by Rubina Perez RN (offgoing nurse). Report included the following information SBAR, Kardex, ED Summary, Intake/Output, MAR, Recent Results and Cardiac Rhythm NSR.     0830 VS stable. All AM meds are given at this time. Patient has no complaints at this time. 1000  at the bedside with patient. Plan is to discharge tomorrow morning. 1129 Blood sugar is 98 mg/dL    1200 VS stable. Patient is up in the chair and call bell within reach. 1400 Patient is still up in the chair, resting quietly, and call bell within reach. 1600 VS stable. Patient has no complaints at this time. 1642 Blood sugar is 85 mg/dL. 1800 VS stable at this time. Patient is still up in the chair, call bell within reach, ,and will continue to monitor. 1900 Bedside shift change report given to Rubina Perez RN (oncoming nurse) by Chio Delacruz RN and Tunde Peters RN (offgoing nurse). Report included the following information SBAR, ED Summary, Intake/Output, MAR, Recent Results and Cardiac Rhythm NSR.

## 2020-09-19 NOTE — PROGRESS NOTES
General Daily Progress Note    Admit Date: 9/16/2020    Subjective:     Patient has no complaint. .     Current Facility-Administered Medications   Medication Dose Route Frequency    [START ON 9/20/2020] insulin glargine (LANTUS) injection 16 Units  16 Units SubCUTAneous DAILY    [START ON 9/20/2020] insulin lispro (HUMALOG) injection 4 Units  4 Units SubCUTAneous ACB    insulin lispro (HUMALOG) injection 2 Units  2 Units SubCUTAneous ACL    insulin lispro (HUMALOG) injection 2 Units  2 Units SubCUTAneous ACD    glucose chewable tablet 16 g  4 Tab Oral PRN    dextrose (D50W) injection syrg 12.5-25 g  25-50 mL IntraVENous PRN    glucagon (GLUCAGEN) injection 1 mg  1 mg IntraMUSCular PRN    ondansetron (ZOFRAN) injection 4 mg  4 mg IntraVENous Q1H PRN    acetaminophen (TYLENOL) tablet 650 mg  650 mg Oral Q6H PRN    ondansetron (ZOFRAN) injection 4 mg  4 mg IntraVENous Q4H PRN    brimonidine (ALPHAGAN) 0.2 % ophthalmic solution 1 Drop  1 Drop Both Eyes BID    clopidogreL (PLAVIX) tablet 75 mg  75 mg Oral DAILY    levothyroxine (SYNTHROID) tablet 175 mcg  175 mcg Oral 6am    zinc oxide-cod liver oil (DESITIN) 40 % paste   Topical BID    dextrose (D50W) injection syrg 12.5-25 g  12.5-25 g IntraVENous PRN        Review of Systems  A comprehensive review of systems was negative. Objective:     Patient Vitals for the past 24 hrs:   BP Temp Pulse Resp SpO2   09/19/20 1049 126/64 97.8 °F (36.6 °C) 67 18 100 %   09/19/20 0743 (!) 168/63 97.9 °F (36.6 °C) 73 18 100 %   09/19/20 0316 (!) 152/57 98.6 °F (37 °C) 70 16 100 %   09/19/20 0019 (!) 152/51 98.4 °F (36.9 °C) 73 18 100 %   09/18/20 1931 119/68 98.6 °F (37 °C) 87 16 100 %   09/18/20 1516 131/73 98.6 °F (37 °C) 83 16 100 %     09/19 0701 - 09/19 1900  In: 240 [P.O.:240]  Out: -   09/17 1901 - 09/19 0700  In: 7151 [P.O.:1135;  I.V.:1460]  Out: 200 [Urine:1900]    Physical Exam:   Visit Vitals  /64 (BP 1 Location: Left arm, BP Patient Position: At rest) Pulse 67   Temp 97.8 °F (36.6 °C)   Resp 18   Ht 5' 3\" (1.6 m)   Wt 143 lb 11.8 oz (65.2 kg)   SpO2 100%   BMI 25.46 kg/m²     General appearance: alert, cooperative, no distress, appears stated age  Neck: supple, symmetrical, trachea midline, no adenopathy, thyroid: not enlarged, symmetric, no tenderness/mass/nodules, no carotid bruit and no JVD  Lungs: clear to auscultation bilaterally  Heart: regular rate and rhythm, S1, S2 normal, no murmur, click, rub or gallop  Abdomen: soft, non-tender. Bowel sounds normal. No masses,  no organomegaly  Extremities: extremities normal, atraumatic, no cyanosis or edema    Assessment:     Active Problems:    Dehydration (9/16/2020)      Diabetic keto-acidosis ([facility] [address] (9/16/2020)      Lactic acid acidosis (9/16/2020)      ONEYDA (acute kidney injury) ([facility] [address] (9/16/2020)        Plan:     1. Too much basal insulin for now. Will adjust accordingly as would be prandial insulin. I anticipated discharge today but will do so tomorrow in view of the changes. 2.  Hydration is more than adequate. 3.  If all goes well discharge tomorrow morning.

## 2020-09-20 LAB
GLUCOSE BLD STRIP.AUTO-MCNC: 185 MG/DL (ref 65–100)
GLUCOSE BLD STRIP.AUTO-MCNC: 260 MG/DL (ref 65–100)
GLUCOSE BLD STRIP.AUTO-MCNC: 48 MG/DL (ref 65–100)
GLUCOSE BLD STRIP.AUTO-MCNC: 60 MG/DL (ref 65–100)
GLUCOSE BLD STRIP.AUTO-MCNC: 76 MG/DL (ref 65–100)
GLUCOSE BLD STRIP.AUTO-MCNC: 92 MG/DL (ref 65–100)
SERVICE CMNT-IMP: ABNORMAL
SERVICE CMNT-IMP: NORMAL
SERVICE CMNT-IMP: NORMAL

## 2020-09-20 PROCEDURE — 74011250637 HC RX REV CODE- 250/637: Performed by: HOSPITALIST

## 2020-09-20 PROCEDURE — 65660000000 HC RM CCU STEPDOWN

## 2020-09-20 PROCEDURE — 74011636637 HC RX REV CODE- 636/637: Performed by: INTERNAL MEDICINE

## 2020-09-20 PROCEDURE — 3331090001 HH PPS REVENUE CREDIT

## 2020-09-20 PROCEDURE — 3331090002 HH PPS REVENUE DEBIT

## 2020-09-20 PROCEDURE — 82962 GLUCOSE BLOOD TEST: CPT

## 2020-09-20 RX ORDER — INSULIN GLARGINE 100 [IU]/ML
12 INJECTION, SOLUTION SUBCUTANEOUS DAILY
Status: DISCONTINUED | OUTPATIENT
Start: 2020-09-20 | End: 2020-09-21 | Stop reason: HOSPADM

## 2020-09-20 RX ORDER — INSULIN LISPRO 100 [IU]/ML
2 INJECTION, SOLUTION INTRAVENOUS; SUBCUTANEOUS
Status: DISCONTINUED | OUTPATIENT
Start: 2020-09-20 | End: 2020-09-21 | Stop reason: HOSPADM

## 2020-09-20 RX ADMIN — CLOPIDOGREL BISULFATE 75 MG: 75 TABLET ORAL at 08:27

## 2020-09-20 RX ADMIN — Medication: at 17:09

## 2020-09-20 RX ADMIN — BRIMONIDINE TARTRATE 1 DROP: 2 SOLUTION OPHTHALMIC at 17:09

## 2020-09-20 RX ADMIN — INSULIN LISPRO 2 UNITS: 100 INJECTION, SOLUTION INTRAVENOUS; SUBCUTANEOUS at 12:06

## 2020-09-20 RX ADMIN — Medication: at 08:27

## 2020-09-20 RX ADMIN — LEVOTHYROXINE SODIUM 175 MCG: 0.12 TABLET ORAL at 07:01

## 2020-09-20 RX ADMIN — INSULIN LISPRO 2 UNITS: 100 INJECTION, SOLUTION INTRAVENOUS; SUBCUTANEOUS at 17:09

## 2020-09-20 RX ADMIN — BRIMONIDINE TARTRATE 1 DROP: 2 SOLUTION OPHTHALMIC at 08:27

## 2020-09-20 RX ADMIN — INSULIN LISPRO 4 UNITS: 100 INJECTION, SOLUTION INTRAVENOUS; SUBCUTANEOUS at 08:28

## 2020-09-20 RX ADMIN — INSULIN GLARGINE 16 UNITS: 100 INJECTION, SOLUTION SUBCUTANEOUS at 08:28

## 2020-09-20 NOTE — PROGRESS NOTES
0700 Bedside shift change report given to Nick RN and Kvng Dempsey RN (oncoming nurse) by Tarik Lawton RN (offgoing nurse). Report included the following information SBAR, ED Summary, Intake/Output, MAR and Cardiac Rhythm NSR.     0900 Dr. Robyn Lopez at the bedside with the patient. Plan is to decrease Lantus 12 units daily and Humalog 2 units TID before meals. 1030 Given patient Bobetta Dapper crackers for snack. 330 [facility] blood sugar and it was 60 mg/dL, given OJ and Jude crackers. Will rechecked in 15 mins. 1149 Rechecked blood sugar and it was 92 mg/dL    1500 VS stable. Patient is resting in the bed and call bell within reach. 1530 Patient is up in the chair, call bell within reach and will continue to monitor. 1600 Given patient Bobetta Dapper crackers for snack. 1800 Patient continues to be up in the chair with no complaints at time. 1900 Bedside shift change report given to Tarik Lawton RN  (oncoming nurse) by Augusto Lewis and VIN Romero (offgoing nurse). Report included the following information SBAR, Kardex, ED Summary, Intake/Output, MAR, Recent Results and Cardiac Rhythm NSR.

## 2020-09-20 NOTE — PROGRESS NOTES
General Daily Progress Note    Admit Date: 9/16/2020    Subjective:     Patient has no complaint . [facility] Current Facility-Administered Medications   Medication Dose Route Frequency    insulin glargine (LANTUS) injection 12 Units  12 Units SubCUTAneous DAILY    insulin lispro (HUMALOG) injection 2 Units  2 Units SubCUTAneous TIDAC    glucose chewable tablet 16 g  4 Tab Oral PRN    dextrose (D50W) injection syrg 12.5-25 g  25-50 mL IntraVENous PRN    glucagon (GLUCAGEN) injection 1 mg  1 mg IntraMUSCular PRN    acetaminophen (TYLENOL) tablet 650 mg  650 mg Oral Q6H PRN    ondansetron (ZOFRAN) injection 4 mg  4 mg IntraVENous Q4H PRN    brimonidine (ALPHAGAN) 0.2 % ophthalmic solution 1 Drop  1 Drop Both Eyes BID    clopidogreL (PLAVIX) tablet 75 mg  75 mg Oral DAILY    levothyroxine (SYNTHROID) tablet 175 mcg  175 mcg Oral 6am    zinc oxide-cod liver oil (DESITIN) 40 % paste   Topical BID        Review of Systems  A comprehensive review of systems was negative.     Objective:     Patient Vitals for the past 24 hrs:   BP Temp Pulse Resp SpO2   09/20/20 0722 (!) 161/65 97.9 °F (36.6 °C) 75 18 100 %   09/20/20 0435   70  100 %   09/20/20 0434   72  100 %   09/20/20 0433 115/85 97.8 °F (36.6 °C) 70 16 100 %   09/20/20 0432   75  100 %   09/20/20 0431   79  100 %   09/20/20 0430   85  100 %   09/20/20 0429     100 %   09/20/20 0428     100 %   09/20/20 0427   76  100 %   09/20/20 0426   70  99 %   09/20/20 0425   76  100 %   09/20/20 0424   81  100 %   09/20/20 0423   71  100 %   09/20/20 0422   71  100 %   09/20/20 0421   70  100 %   09/20/20 0420   69  100 %   09/20/20 0419   72  100 %   09/20/20 0418   71  99 %   09/20/20 0417   70  100 %   09/20/20 0416   72  100 %   09/20/20 0415   73  100 %   09/20/20 0414   70  100 %   09/20/20 0413   69  100 %   09/20/20 0412   72  100 %   09/20/20 0411   71  99 %   09/20/20 0410   71  99 % 09/20/20 0409   80  100 %   09/20/20 0408   74  98 %   09/20/20 0407   78  100 %   09/20/20 0406   70  100 %   09/20/20 0405   73  100 %   09/20/20 0404   73  100 %   09/20/20 0403   77  100 %   09/20/20 0402   76  100 %   09/20/20 0401   76  100 %   09/20/20 0400   81  100 %   09/20/20 0359   82  100 %   09/20/20 0358   71  100 %   09/20/20 0357   70  100 %   09/20/20 0356   80  100 %   09/20/20 0355   78  100 %   09/20/20 0354   70  100 %   09/20/20 0353   79  100 %   09/20/20 0352   70  99 %   09/20/20 0351   76  100 %   09/20/20 0350   70  100 %   09/20/20 0349   77  99 %   09/20/20 0348   74  100 %   09/20/20 0347   73  100 %   09/20/20 0346   77  100 %   09/20/20 0345   76  99 %   09/20/20 0344   73  98 %   09/20/20 0343   70  100 %   09/20/20 0342   75  100 %   09/20/20 0341   78  100 %   09/20/20 0340   78  99 %   09/20/20 0339   81  100 %   09/20/20 0338   82  100 %   09/20/20 0337   76  100 %   09/20/20 0336   84  100 %   09/20/20 0335   82  100 %   09/20/20 0334   77  100 %   09/20/20 0333   75  100 %   09/20/20 0332   77  100 %   09/20/20 0331   80  100 %   09/20/20 0330   78  96 %   09/20/20 0329   80  93 %   09/20/20 0328   82  100 %   09/20/20 0327   72  100 %   09/20/20 0326   75  100 %   09/20/20 0325   75  100 %   09/20/20 0324   73  100 %   09/20/20 0323   73  100 %   09/20/20 0322   77  100 %   09/20/20 0321   74  100 %   09/20/20 0320   75  100 %   09/20/20 0319   70  100 %   09/20/20 0318   72  100 %   09/20/20 0317   72  100 %   09/20/20 0316   71  100 %   09/20/20 0315   72  100 %   09/20/20 0314   79  100 %   09/20/20 0313   71  100 %   09/20/20 0312   76  100 %   09/20/20 0311   78  100 %   09/20/20 0310   71  100 %   09/20/20 0309   73  100 %   09/20/20 0308   69  100 %   09/20/20 0307   81  100 % 09/20/20 0306   69  100 %   09/20/20 0305   70  100 %   09/20/20 0304   73  100 %   09/20/20 0303   77  100 %   09/20/20 0302   70  100 %   09/20/20 0301   70  100 %   09/20/20 0300   75  100 %   09/20/20 0259   70  100 %   09/20/20 0258   76  100 %   09/20/20 0257   73  100 %   09/20/20 0256   77  100 %   09/20/20 0255   82  100 %   09/20/20 0254   75  100 %   09/20/20 0253   78  100 %   09/20/20 0252   81  100 %   09/20/20 0251   74  100 %   09/20/20 0250   82  100 %   09/20/20 0249   83  100 %   09/20/20 0248   72  100 %   09/20/20 0247   84  100 %   09/20/20 0246   95  100 %   09/20/20 0245   83  100 %   09/20/20 0244   77  97 %   09/20/20 0243   76  97 %   09/20/20 0242   76  97 %   09/20/20 0241   80  100 %   09/20/20 0240   80  100 %   09/20/20 0239   81  100 %   09/20/20 0238   72  100 %   09/20/20 0237   79  99 %   09/20/20 0236   72  100 %   09/20/20 0235   78  100 %   09/20/20 0234   73  100 %   09/20/20 0233   75  100 %   09/20/20 0232   78  100 %   09/20/20 0231   78  99 %   09/20/20 0230   72  100 %   09/20/20 0229   73  100 %   09/20/20 0228   72  100 %   09/20/20 0227   74  100 %   09/20/20 0226   77  98 %   09/20/20 0225   80  100 %   09/20/20 0224   79  100 %   09/20/20 0223   79  100 %   09/20/20 0222   73  100 %   09/20/20 0221   73  100 %   09/20/20 0220   80  100 %   09/20/20 0219   76  100 %   09/20/20 0218   76  100 %   09/20/20 0217   79  100 %   09/20/20 0216   82  100 %   09/20/20 0215   77  99 %   09/20/20 0214   79  100 %   09/20/20 0213   81  100 %   09/20/20 0212   80  100 %   09/20/20 0211   79  100 %   09/20/20 0210   79  100 %   09/20/20 0209   78  100 %   09/20/20 0208   79  100 %   09/20/20 0207   78  99 %   09/20/20 0206   81  100 %   09/20/20 0205   78  100 %   09/20/20 0204   76  100 % 09/20/20 0203   73  100 %   09/20/20 0202   78  100 %   09/20/20 0201   76  99 %   09/20/20 0200   75  100 %   09/20/20 0159   79  96 %   09/20/20 0158   73  100 %   09/20/20 0157   73  100 %   09/20/20 0156   72  100 %   09/20/20 0155   74  100 %   09/20/20 0154   72  100 %   09/20/20 0153   75  100 %   09/20/20 0152   75  100 %   09/20/20 0151   81  100 %   09/20/20 0150   82  100 %   09/20/20 0149   77  100 %   09/20/20 0148   82  100 %   09/20/20 0147   80  100 %   09/20/20 0146   85  100 %   09/20/20 0145   84  100 %   09/20/20 0144   82  100 %   09/20/20 0143   82  100 %   09/20/20 0142   80  100 %   09/20/20 0141   81  99 %   09/20/20 0140   81  100 %   09/20/20 0139   79  100 %   09/20/20 0138   77  100 %   09/20/20 0137   77  100 %   09/20/20 0136   74  100 %   09/20/20 0135   75  99 %   09/20/20 0134   75  100 %   09/20/20 0133   80  100 %   09/20/20 0132   78  100 %   09/20/20 0131   78  100 %   09/20/20 0130   82  100 %   09/20/20 0129   73  100 %   09/20/20 0128   75  100 %   09/20/20 0127   73  100 %   09/20/20 0126   73  100 %   09/20/20 0125   73  100 %   09/20/20 0124   75  100 %   09/20/20 0123   72  100 %   09/20/20 0122   72  100 %   09/20/20 0121   74  100 %   09/20/20 0120   72  100 %   09/20/20 0119   75  100 %   09/20/20 0118   73  100 %   09/20/20 0117   72  100 %   09/20/20 0116   73  100 %   09/20/20 0115   74  100 %   09/20/20 0114   75  100 %   09/20/20 0113   80  100 %   09/20/20 0112   75  100 %   09/20/20 0111   80  100 %   09/20/20 0110   76  100 %   09/20/20 0109   77  100 %   09/20/20 0108   73  100 %   09/20/20 0107   73  100 %   09/20/20 0106   71  100 %   09/20/20 0105   71  100 %   09/20/20 0104   72  100 %   09/20/20 0103   72  100 %   09/20/20 0102   72  100 %   09/20/20 0101   71  100 % 09/20/20 0100   71  100 %   09/20/20 0059   72  100 %   09/20/20 0058   81  100 %   09/20/20 0057   73  100 %   09/20/20 0056   83  100 %   09/20/20 0055   73  100 %   09/20/20 0054   73  100 %   09/20/20 0053   75  100 %   09/20/20 0052   74  100 %   09/20/20 0051   75  100 %   09/20/20 0050   76  100 %   09/20/20 0049   80  100 %   09/20/20 0048   83  100 %   09/20/20 0047   80  100 %   09/20/20 0046   84  100 %   09/20/20 0045   82  100 %   09/20/20 0044   72  100 %   09/20/20 0043   75  100 %   09/20/20 0042   73  100 %   09/20/20 0041   75  100 %   09/20/20 0040   74  100 %   09/20/20 0039   74  100 %   09/20/20 0038   75  100 %   09/20/20 0037   74  100 %   09/20/20 0036   75  100 %   09/20/20 0035   75  100 %   09/20/20 0034   76  100 %   09/20/20 0033   80  100 %   09/20/20 0032   75  100 %   09/20/20 0031   71  100 %   09/20/20 0030   75  100 %   09/20/20 0029   78  100 %   09/20/20 0028   80  100 %   09/20/20 0027   73  100 %   09/20/20 0026   77  100 %   09/20/20 0025   74  100 %   09/20/20 0024   76  100 %   09/20/20 0023   79  100 %   09/20/20 0022   77  100 %   09/20/20 0021   75  100 %   09/20/20 0020   75  100 %   09/20/20 0019   72  100 %   09/20/20 0018   80  100 %   09/20/20 0017   74  100 %   09/20/20 0016   75  100 %   09/20/20 0015   79  100 %   09/20/20 0014   80  100 %   09/20/20 0013   79  100 %   09/20/20 0012   77  100 %   09/20/20 0011   83  99 %   09/20/20 0010   82  100 %   09/20/20 0009   78  100 %   09/20/20 0008   73  100 %   09/20/20 0007   74  100 %   09/20/20 0006   74  100 %   09/20/20 0005   73  100 %   09/20/20 0004   74  100 %   09/20/20 0003   83  100 %   09/20/20 0002   79  100 %   09/20/20 0001   75  100 %   09/19/20 2359   76  100 %   09/19/20 2358   81  100 %   09/19/20 2357   78  100 %   09/19/20 2356   78  100 %   09/19/20 2355   80  100 %   09/19/20 2354   81  100 %   09/19/20 2353   78  100 %   09/19/20 2352   82  100 %   09/19/20 2351   83  99 %   09/19/20 2350   84  100 %   09/19/20 2349   81  100 %   09/19/20 2348   83  100 %   09/19/20 2347   82  99 %   09/19/20 2346   87  100 %   09/19/20 2345   86  100 %   09/19/20 2344   90  100 %   09/19/20 2343   88  100 %   09/19/20 2342   85  98 %   09/19/20 2341   90  100 %   09/19/20 2340   83  100 %   09/19/20 2339   79  100 %   09/19/20 2338   81  100 %   09/19/20 2337   79  100 %   09/19/20 2336   77  100 %   09/19/20 2335   76  100 %   09/19/20 2334   77  100 %   09/19/20 2333   77  100 %   09/19/20 2332   77  100 %   09/19/20 2331   77  100 %   09/19/20 2330   78  100 %   09/19/20 2329   77  100 %   09/19/20 2328   78  100 %   09/19/20 2327   77  100 %   09/19/20 2326   77  100 %   09/19/20 2325   77  100 %   09/19/20 2324   77  100 %   09/19/20 2323   77  100 %   09/19/20 2322   77  100 %   09/19/20 2321   76  100 %   09/19/20 2320   78  100 %   09/19/20 2319   78  100 %   09/19/20 2318   79  100 %   09/19/20 2317   82  100 %   09/19/20 2316   85  100 %   09/19/20 2315   88  100 %   09/19/20 2314   76  100 %   09/19/20 2313   77  100 %   09/19/20 2312   77  100 %   09/19/20 2311   77  100 %   09/19/20 2310   77  100 %   09/19/20 2309   77  100 %   09/19/20 2308   77  100 %   09/19/20 2307   77  100 %   09/19/20 2306   78  100 %   09/19/20 2305   78  100 %   09/19/20 2304   78  100 %   09/19/20 2303   79  100 %   09/19/20 2302   78  100 %   09/19/20 2301   78  100 %   09/19/20 2300   79  100 %   09/19/20 2259   80  100 %   09/19/20 2258   79  100 %   09/19/20 2257   81  100 %   09/19/20 2256   79  100 %   09/19/20 2255   79  100 %   09/19/20 2254   78  100 %   09/19/20 2253   79  100 %   09/19/20 2252   79  100 %   09/19/20 2251   79  100 %   09/19/20 2250   78  100 %   09/19/20 2249   79  100 %   09/19/20 2248   79  100 %   09/19/20 2247   78  100 %   09/19/20 2246   80  100 %   09/19/20 2245   80  100 %   09/19/20 2244   80  100 %   09/19/20 2243   81  100 %   09/19/20 2242   82  100 %   09/19/20 2241   79  100 %   09/19/20 2240   80  100 %   09/19/20 2239   81  100 %   09/19/20 2238   86  100 %   09/19/20 2237   92  100 %   09/19/20 2236   79  100 %   09/19/20 2235   82  100 %   09/19/20 2234   84  99 %   09/19/20 2233   83  100 %   09/19/20 2232   83  100 %   09/19/20 2231   84     09/19/20 2230   86  95 %   09/19/20 2229   84  100 %   09/19/20 2228   85  100 %   09/19/20 2227   85  100 %   09/19/20 2226   85  100 %   09/19/20 2225   87  100 %   09/19/20 2224   85  100 %   09/19/20 2223   85  100 %   09/19/20 2222   85  100 %   09/19/20 2221   82  100 %   09/19/20 2220   79  100 %   09/19/20 2219   82  100 %   09/19/20 2218   78  100 %   09/19/20 2217   86  100 %   09/19/20 2216   84  100 %   09/19/20 2215   81  100 %   09/19/20 2214   85  100 %   09/19/20 2213   83  100 %   09/19/20 2212 138/68 97.7 °F (36.5 °C) 84 16 100 %   09/19/20 2211 136/68  81  100 %   09/19/20 2210   86  100 %   09/19/20 2209   85  100 %   09/19/20 2208   83  100 %   09/19/20 2207   85  100 %   09/19/20 2206   85  100 %   09/19/20 2205   83  100 %   09/19/20 2204   85  100 %   09/19/20 2203   82  100 %   09/19/20 2202   91  100 %   09/19/20 2201   90  100 %   09/19/20 2200   89  100 %   09/19/20 2159   94  100 %   09/19/20 2158   93  100 %   09/19/20 2157   95  100 %   09/19/20 2156   93  100 %   09/19/20 2155   86  100 %   09/19/20 2154   89  100 %   09/19/20 2153   79  100 %   09/19/20 2152   79  100 % 09/19/20 2151   79  100 %   09/19/20 2150   81  (!) 88 %   09/19/20 2149   83  100 %   09/19/20 2148   79  100 %   09/19/20 2147   79  100 %   09/19/20 2146   81  100 %   09/19/20 2145   81  100 %   09/19/20 2144   87  97 %   09/19/20 2143   82  100 %   09/19/20 2142   82  100 %   09/19/20 2141   85  100 %   09/19/20 2140   84  100 %   09/19/20 2139   84  99 %   09/19/20 2138   86  99 %   09/19/20 2137   84  100 %   09/19/20 2136   87  99 %   09/19/20 2135   86  100 %   09/19/20 2134   85  93 %   09/19/20 2133   85     09/19/20 2132   85  100 %   09/19/20 2131   83     09/19/20 2130   85  96 %   09/19/20 2129   83  96 %   09/19/20 2128   82  97 %   09/19/20 2127   83  93 %   09/19/20 2126   82     09/19/20 2125   89  100 %   09/19/20 2124   89     09/19/20 2123   81     09/19/20 2122   88  96 %   09/19/20 2121   80     09/19/20 2120   82  94 %   09/19/20 2119   83  98 %   09/19/20 2118   80  100 %   09/19/20 2117   80  97 %   09/19/20 2116   85  100 %   09/19/20 2115   79  100 %   09/19/20 2114   78  100 %   09/19/20 2113   78  100 %   09/19/20 2112   78  100 %   09/19/20 2111   85  100 %   09/19/20 2110   86  100 %   09/19/20 2109   83  100 %   09/19/20 2108   82  100 %   09/19/20 2107   87  100 %   09/19/20 2106   88  99 %   09/19/20 2105   80  100 %   09/19/20 2104   88  98 %   09/19/20 2103   88  100 %   09/19/20 2102   88  100 %   09/19/20 2101   81  100 %   09/19/20 2100   78  100 %   09/19/20 2059   81  100 %   09/19/20 2058   84  100 %   09/19/20 2057   85  100 %   09/19/20 2056   86  100 %   09/19/20 2055   86  100 %   09/19/20 2054   85  100 %   09/19/20 2053   87  100 %   09/19/20 2052   87  100 %   09/19/20 2051   89  100 %   09/19/20 2050   92  100 %   09/19/20 2049   90  100 %   09/19/20 2048   90  99 % 09/19/20 2047   93  100 %   09/19/20 2046   87  100 %   09/19/20 2045   87  100 %   09/19/20 2044   89  100 %   09/19/20 2043   91  100 %   09/19/20 2042   84  100 %   09/19/20 2041   85  100 %   09/19/20 2040   85  100 %   09/19/20 2039   88  100 %   09/19/20 2038   89  100 %   09/19/20 2037   81  100 %   09/19/20 2036   85  100 %   09/19/20 2035   86  100 %   09/19/20 2034   86  100 %   09/19/20 2033   81  100 %   09/19/20 2032   79  100 %   09/19/20 2031   80  100 %   09/19/20 2030   82  100 %   09/19/20 2029   83  100 %   09/19/20 2028   83  100 %   09/19/20 2027   79  100 %   09/19/20 2026   79  100 %   09/19/20 2025   80  100 %   09/19/20 2024   80  100 %   09/19/20 2023   82  100 %   09/19/20 2022   78  100 %   09/19/20 2021   78  100 %   09/19/20 2020   76  100 %   09/19/20 2019   77  100 %   09/19/20 2018   77  100 %   09/19/20 2017   78  100 %   09/19/20 2016   75  100 %   09/19/20 2015   77  100 %   09/19/20 2014   75  100 %   09/19/20 2013   77  100 %   09/19/20 2012   78  100 %   09/19/20 2011   82  97 %   09/19/20 2010   79  100 %   09/19/20 2009   80  100 %   09/19/20 2008   78  100 %   09/19/20 2007   76  100 %   09/19/20 2006   76  100 %   09/19/20 2005   78  100 %   09/19/20 2004   79  100 %   09/19/20 2003   77  100 %   09/19/20 2002   78  100 %   09/19/20 2001   80  99 %   09/19/20 2000   79  100 %   09/19/20 1959   76  100 %   09/19/20 1958   75  100 %   09/19/20 1957   76  100 %   09/19/20 1956   77  100 %   09/19/20 1955   76  100 %   09/19/20 1954   75  100 %   09/19/20 1953   77  100 %   09/19/20 1952   76  100 %   09/19/20 1951   76  100 %   09/19/20 1950   75  100 %   09/19/20 1949   77  100 %   09/19/20 1948   76  100 %   09/19/20 1947   74  100 %   09/19/20 1946   74  100 %   09/19/20 1945   75  100 %   09/19/20 1944   79  100 %   09/19/20 1943   77  100 %   09/19/20 1942   74  100 %   09/1941   77  100 %   09/19/20 1940   75  100 %   09/19/20 1939   75  100 %   09/19/20 1938   79  100 %   09/19/20 1937   76  100 %   09/19/20 1936   75  100 %   09/19/20 1935   75  100 %   09/19/20 1934   77  100 %   09/19/20 1933   77  100 %   09/19/20 1932   78  100 %   09/19/20 1931   77  100 %   09/19/20 1930   75  100 %   09/19/20 1929   75  100 %   09/19/20 1928   75  100 %   09/19/20 1927   81  100 %   09/19/20 1926   74  100 %   09/19/20 1925   79  100 %   09/19/20 1924 (!) 151/64 97.9 °F (36.6 °C) 74 15 99 %   09/19/20 1529 131/65 98.5 °F (36.9 °C) 72 16 100 %   09/19/20 1049 126/64 97.8 °F (36.6 °C) 67 18 100 %     09/20 0701 - 09/20 1900  In: -   Out: 200 [Urine:200]  09/18 1901 - 09/20 0700  In: 1198 [P.O.:1255; I.V.:1460]  Out: 2250 [Urine:2250]    Physical Exam:   Visit Vitals  BP (!) 161/65 (BP 1 Location: Right arm, BP Patient Position: At rest)   Pulse 75   Temp 97.9 °F (36.6 °C)   Resp 18   Ht 5' 3\" (1.6 m)   Wt 143 lb 11.8 oz (65.2 kg)   SpO2 100%   BMI 25.46 kg/m²     General appearance: alert, cooperative, no distress, appears stated age  Neck: supple, symmetrical, trachea midline, no adenopathy, thyroid: not enlarged, symmetric, no tenderness/mass/nodules, no carotid bruit and no JVD  Lungs: clear to auscultation bilaterally  Heart: regular rate and rhythm, S1, S2 normal, no murmur, click, rub or gallop  Abdomen: soft, non-tender. Bowel sounds normal. No masses,  no organomegaly  Extremities: extremities normal, atraumatic, no cyanosis or edema    Assessment:     Active Problems:    Dehydration (9/16/2020)      Diabetic keto-acidosis ([facility] [address] (9/16/2020)      Lactic acid acidosis (9/16/2020)      ONEYDA (acute kidney injury) ([facility] (9/16/2020)        Plan:     1.   Interestingly patient's insulin requirements are progressively decreasing which predisposes her to hypoglycemia. This is especially dangerous because of her existing hypoglycemic unawareness. We will continue to reduce insulin realizing that \"I do in the hospital is literally irrelevant as to what happens when she goes home. Her repeated episodes of DKA will continue the current setting that she lives in. She can very easily get continuous glucose monitoring with a huan device but unfortunately I have had no help from the family to secure this. Smaller doses of insulin at home are a contributing factor to DKA to aggressively increase her insulin amounts would predispose her to hypoglycemia because of her erratic eating habits. Unfortunately I am obligated to at least have her in a more protected situation as it relates to hypoglycemia before discharge. 2.  Hydration is adequate. 3.  Fortunately she is consuming all of her food.

## 2020-09-20 NOTE — PROGRESS NOTES
Updates given by Bill BANUELOS     Pt had a low blood sugar before bed. Given orange juice x 2 . Pt stated that she was hungry and had not eaten all day, meal was heated for pt.  Pt completed entire meal.     0700: updates given to VIN Lackey

## 2020-09-21 VITALS
HEIGHT: 63 IN | TEMPERATURE: 97.8 F | WEIGHT: 143.74 LBS | HEART RATE: 79 BPM | SYSTOLIC BLOOD PRESSURE: 130 MMHG | RESPIRATION RATE: 16 BRPM | BODY MASS INDEX: 25.47 KG/M2 | DIASTOLIC BLOOD PRESSURE: 41 MMHG | OXYGEN SATURATION: 100 %

## 2020-09-21 LAB
GLUCOSE BLD STRIP.AUTO-MCNC: 196 MG/DL (ref 65–100)
GLUCOSE BLD STRIP.AUTO-MCNC: 374 MG/DL (ref 65–100)
GLUCOSE BLD STRIP.AUTO-MCNC: 411 MG/DL (ref 65–100)
SERVICE CMNT-IMP: ABNORMAL

## 2020-09-21 PROCEDURE — 3331090001 HH PPS REVENUE CREDIT

## 2020-09-21 PROCEDURE — 74011250637 HC RX REV CODE- 250/637: Performed by: HOSPITALIST

## 2020-09-21 PROCEDURE — 3331090002 HH PPS REVENUE DEBIT

## 2020-09-21 PROCEDURE — 82962 GLUCOSE BLOOD TEST: CPT

## 2020-09-21 PROCEDURE — 74011636637 HC RX REV CODE- 636/637: Performed by: INTERNAL MEDICINE

## 2020-09-21 RX ORDER — INSULIN LISPRO 100 [IU]/ML
INJECTION, SOLUTION INTRAVENOUS; SUBCUTANEOUS
Qty: 2 ADJUSTABLE DOSE PRE-FILLED PEN SYRINGE | Refills: 11 | Status: ON HOLD | OUTPATIENT
Start: 2020-09-21 | End: 2020-10-25 | Stop reason: SDUPTHER

## 2020-09-21 RX ORDER — INSULIN DEGLUDEC INJECTION 100 U/ML
14 INJECTION, SOLUTION SUBCUTANEOUS DAILY
Qty: 2 ADJUSTABLE DOSE PRE-FILLED PEN SYRINGE | Refills: 11 | Status: ON HOLD | OUTPATIENT
Start: 2020-09-21 | End: 2020-10-25 | Stop reason: SDUPTHER

## 2020-09-21 RX ADMIN — INSULIN LISPRO 2 UNITS: 100 INJECTION, SOLUTION INTRAVENOUS; SUBCUTANEOUS at 17:01

## 2020-09-21 RX ADMIN — BRIMONIDINE TARTRATE 1 DROP: 2 SOLUTION OPHTHALMIC at 08:27

## 2020-09-21 RX ADMIN — INSULIN LISPRO 2 UNITS: 100 INJECTION, SOLUTION INTRAVENOUS; SUBCUTANEOUS at 12:05

## 2020-09-21 RX ADMIN — INSULIN GLARGINE 12 UNITS: 100 INJECTION, SOLUTION SUBCUTANEOUS at 08:26

## 2020-09-21 RX ADMIN — INSULIN LISPRO 2 UNITS: 100 INJECTION, SOLUTION INTRAVENOUS; SUBCUTANEOUS at 08:25

## 2020-09-21 RX ADMIN — LEVOTHYROXINE SODIUM 175 MCG: 0.12 TABLET ORAL at 06:10

## 2020-09-21 RX ADMIN — Medication: at 17:02

## 2020-09-21 RX ADMIN — CLOPIDOGREL BISULFATE 75 MG: 75 TABLET ORAL at 08:26

## 2020-09-21 RX ADMIN — BRIMONIDINE TARTRATE 1 DROP: 2 SOLUTION OPHTHALMIC at 17:02

## 2020-09-21 RX ADMIN — Medication: at 08:27

## 2020-09-21 NOTE — DISCHARGE INSTRUCTIONS
General Discharge Instructions    Patient ID:  Kit Hodges  [id number]  67 y.o.  1940    Patient Instructions          The following personal items were collected during your admission and were returned to you. Take Home Medications             What to do at Home    Recommended diet: Diabetic Diet with bedtime snack    Recommended activity: Activity as tolerated    Follow-up with Margarita Rolle MD  in 3 days. Information obtained by :  I understand that if any problems occur once I am at home I am to contact my physician. I understand and acknowledge receipt of the instructions indicated above.                                                                                                                                            Physician's or R.N.'s Signature                                                                  Date/Time                                                                                                                                              Patient or Representative Signature                                                          Date/Time

## 2020-09-21 NOTE — PROGRESS NOTES
1715: Patient's son Leti Carcamo arrived to  patient for discharge. Discharge instructions reviewed. All questions answered. Patient ready to d/c.

## 2020-09-21 NOTE — PROGRESS NOTES
Spiritual Care Assessment/Progress Note  [facility]      NAME: Jodee Rivera      MRN: [id number]  AGE: 67 y.o.  SEX: female  Oriental orthodox Affiliation: Advent   Language: English     9/21/2020     Total Time (in minutes): 7     Spiritual Assessment begun in [facility] 2 PROGRESSIVE CARE through conversation with:         [x]Patient        [] Family    [] Friend(s)        Reason for Consult: Initial/Spiritual assessment, patient floor     Spiritual beliefs: (Please include comment if needed)     [x] Identifies with a erlinda tradition:   Howie Gonzáles      [] Supported by a erlinda community:            [] Claims no spiritual orientation:           [] Seeking spiritual identity:                [] Adheres to an individual form of spirituality:           [] Not able to assess:                           Identified resources for coping:      [x] Prayer                               [] Music                  [] Guided Imagery     [x] Family/friends                 [] Pet visits     [] Devotional reading                         [] Unknown     [] Other:                                               Interventions offered during this visit: (See comments for more details)    Patient Interventions: Affirmation of emotions/emotional suffering, Affirmation of erlinda, Coping skills reviewed/reinforced, Initial/Spiritual assessment, patient floor, Prayer (actual), Prayer (assurance of)           Plan of Care:     [x] Support spiritual and/or cultural needs    [] Support AMD and/or advance care planning process      [] Support grieving process   [] Coordinate Rites and/or Rituals    [] Coordination with community clergy   [] No spiritual needs identified at this time   [] Detailed Plan of Care below (See Comments)  [] Make referral to Music Therapy  [] Make referral to Pet Therapy     [] Make referral to Addiction services  [] Make referral to [facility]  [] Make referral to Spiritual Care Partner  [] No future visits requested        [x] Follow up visits as needed     Comments: Provided support for this pt in ED [facility] 8[facility]. Facilitated life review to assess potential support needs or coping strategies. Pt offered review of current condition. Pt relies upon erlinda as expressed through prayer to cope with difficult situations. Offered pastoral support through prayer. Assured pt of prayers and affirmed ongoing availability of support. Taylor Johnson MDiv.  Staff   Request  Support/Spiritual Care Services via [facility]

## 2020-09-21 NOTE — PROGRESS NOTES
ALEKSANDAR Plan:    D/C Home with New JarrodteresaHermann Owen 6  F/U appointment- PCP Dr. Deon Benedict - Daughter to make f/u appointment. 2nd IM Letter given  Transportation provided by Son- Yadi Butler t 5pm    CM discussed d/c plan with pt and /or caregiver- Daughter- Gio Porter and Vivek Mcwilliams. .  Pt and/or caregiver agreed with d/c plan. Pt verbalized no concerns when asked. Medicare pt has received, reviewed, and signed 2nd IM letter informing them of their right to appeal the discharge. Signed copied has been placed on pt bedside chart. Pt is cleared for d/c from CM standpoint. Care Management Interventions  PCP Verified by CM: Yes  Mode of Transport at Discharge:  Other (see comment)(Son, Yadi Butler to provide transportation)  Transition of Care Consult (CM Consult): Home Health, Discharge Planning  [place] [id number] [address]: Yes  Discharge Durable Medical Equipment: No  Physical Therapy Consult: Yes  Occupational Therapy Consult: Yes  Current Support Network: Lives with Caregiver(Pt lives with her two son's in a 3 level home ,one son has intellectual disability.)  Confirm Follow Up Transport: Family  Discharge Location  Discharge Placement: Home with home health    [address]  Phone: [phone]

## 2020-09-21 NOTE — PROGRESS NOTES
1900  Bedside shift change report given Santo Stewart  RN (offgoing nurse) Andrew Manley RN (ongoing nurse). Report included the following information SBAR, ED Summary, Intake/Output, MAR and Cardiac Rhythm NSR.

## 2020-09-22 ENCOUNTER — HOME CARE VISIT (OUTPATIENT)
Dept: HOME HEALTH SERVICES | Facility: HOME HEALTH | Age: 80
End: 2020-09-22
Payer: MEDICARE

## 2020-09-22 ENCOUNTER — PATIENT OUTREACH (OUTPATIENT)
Dept: CASE MANAGEMENT | Age: 80
End: 2020-09-22

## 2020-09-22 ENCOUNTER — HOME CARE VISIT (OUTPATIENT)
Dept: SCHEDULING | Facility: HOME HEALTH | Age: 80
End: 2020-09-22
Payer: MEDICARE

## 2020-09-22 VITALS
TEMPERATURE: 98.6 F | OXYGEN SATURATION: 99 % | SYSTOLIC BLOOD PRESSURE: 154 MMHG | DIASTOLIC BLOOD PRESSURE: 64 MMHG | HEART RATE: 86 BPM | RESPIRATION RATE: 18 BRPM

## 2020-09-22 VITALS
RESPIRATION RATE: 17 BRPM | DIASTOLIC BLOOD PRESSURE: 89 MMHG | OXYGEN SATURATION: 98 % | HEART RATE: 70 BPM | SYSTOLIC BLOOD PRESSURE: 130 MMHG | TEMPERATURE: 98.4 F

## 2020-09-22 PROCEDURE — 3331090001 HH PPS REVENUE CREDIT

## 2020-09-22 PROCEDURE — 3331090002 HH PPS REVENUE DEBIT

## 2020-09-22 PROCEDURE — G0151 HHCP-SERV OF PT,EA 15 MIN: HCPCS

## 2020-09-22 PROCEDURE — G0299 HHS/HOSPICE OF RN EA 15 MIN: HCPCS

## 2020-09-22 NOTE — Clinical Note
ED [facility] 9/16-9/21 DKA. Multiple admissions for same. See yellow box. Envera to contact your nurse for RAJNI RUVALCABA appt asap.   Thanks,  Vikram Winter

## 2020-09-22 NOTE — PROGRESS NOTES
Called patient on 9/22- says she got home late last night. Said she feels fine but does not want to review meds with me today. Asked me to call her back tomorrow. Did ask her if she had checked her blood sugar this am, she said no. Encouraged her to do so. CTN to call her back tomorrow per patient request.  Called patient back on 9/23- about 9:30am. Patient requested that I call her back later - advised will call back at 11:00am, she agreed to that plan. Son called back on 9/23 and put mother on the phone. Patient was admitted to [facility] on 9/16 and discharged on 9/21 for DKA. Patient was contacted within 1 business days of discharge. Top Discharge Challenges to be reviewed by the provider   Additional needs identified to be addressed with provider yes  Patient would not talk to me until today. Some confusion on her dose of [place]. Was not aware that new dose was 14 units qam.   Says daughter helps her with her meds, but noted in chart, daughter currently quarantining. Has not checked her blood sugar. Says she does it occasionally. Urged to check at least qd as her numbers have been so high. She agreed to do so- unsure if she will follow through. SW referral ?     Discussed COVID-19 related testing which was not done at this time. Test results were not done. Method of communication with provider : chart routing       Advance Care Planning:   Does patient have an Advance Directive:  currently not on file; education provided     Inpatient Readmission Risk score: 50%  Was this a readmission? yes  Patient stated reason for the admission: blood sugar was high  Patients top risk factors for readmission: functional cognitive ability, lack of knowledge about disease, medical condition, medication management and support system  Interventions to address risk factors: Reviewed discharge instructions with patient,reviewed meds- education provided on change in doses of her 2 insulin. Reviewed red flags. Reviewed fall precautions. Encouraged to check blood sugar levels at least once a day and report abnormal numbers to pcp asap. Contacted [facility] to request ALEKSANDAR with Dr.Bonner da silva. Care Transition Nurse (CTN) contacted the patient by telephone to perform post hospital discharge assessment. Verified name and  with patient as identifiers. Provided introduction to self, and explanation of the CTN role. CTN reviewed discharge instructions, medical action plan and red flags with patient who verbalized understanding. Patient given an opportunity to ask questions and does not have any further questions or concerns at this time. The patient agrees to contact the PCP office for questions related to their healthcare. Medication reconciliation was performed with patient, who verbalizes understanding of administration of home medications. Advised obtaining a 90-day supply of all daily and as-needed medications. Referral to Pharm D needed: no     Home Health/Outpatient orders at discharge: home health care, PT, OT and [facility]  Date of initial visit: 2020    [address] [place] [address] ordered at discharge: none  [address] received: na    Covid Risk Education    Patient has following risk factors of: diabetes. Education provided regarding infection prevention, and signs and symptoms of COVID-19 and when to seek medical attention with patient who verbalized understanding. Discussed exposure protocols and quarantine From CDC: Are you at higher risk for severe illness?  and given an opportunity for questions and concerns. The patient agrees to contact the COVID-19 hotline [phone] or PCP office for questions related to COVID-19.      For more information on steps you can take to protect yourself, see CDC's How to Protect Yourself     Patient/family/caregiver given information for Shanta Santana and agrees to enroll no  Patient's preferred e-mail: declines  Patient's preferred phone number: declines    Discussed follow-up appointments. If no appointment was previously scheduled, appointment scheduling offered: yes  [address] follow up appointment(s): Amado sending message to 's nurse requesting RAJNI RUVALCABA appointment asap. Future Appointments   Date Time Provider Tawnya Evans   9/22/2020 To Be Determined Nicci Alexander PT [facility]     [id number] No French follow up appointment(s): na  Plan for follow-up call in 5-7 days based on severity of symptoms and risk factors. CTN provided contact information for future needs.     Goals Addressed    None

## 2020-09-23 ENCOUNTER — HOME CARE VISIT (OUTPATIENT)
Dept: HOME HEALTH SERVICES | Facility: HOME HEALTH | Age: 80
End: 2020-09-23
Payer: MEDICARE

## 2020-09-23 ENCOUNTER — HOME CARE VISIT (OUTPATIENT)
Dept: SCHEDULING | Facility: HOME HEALTH | Age: 80
End: 2020-09-23
Payer: MEDICARE

## 2020-09-23 VITALS
OXYGEN SATURATION: 99 % | DIASTOLIC BLOOD PRESSURE: 66 MMHG | HEART RATE: 100 BPM | RESPIRATION RATE: 20 BRPM | TEMPERATURE: 98.2 F | SYSTOLIC BLOOD PRESSURE: 160 MMHG

## 2020-09-23 PROCEDURE — 3331090001 HH PPS REVENUE CREDIT

## 2020-09-23 PROCEDURE — G0299 HHS/HOSPICE OF RN EA 15 MIN: HCPCS

## 2020-09-23 PROCEDURE — 3331090002 HH PPS REVENUE DEBIT

## 2020-09-23 NOTE — ACP (ADVANCE CARE PLANNING)
Advance Care Planning:   Does patient have an Advance Directive:  currently not on file; education provided

## 2020-09-24 ENCOUNTER — HOME CARE VISIT (OUTPATIENT)
Dept: SCHEDULING | Facility: HOME HEALTH | Age: 80
End: 2020-09-24
Payer: MEDICARE

## 2020-09-24 VITALS
HEART RATE: 82 BPM | SYSTOLIC BLOOD PRESSURE: 160 MMHG | DIASTOLIC BLOOD PRESSURE: 76 MMHG | RESPIRATION RATE: 18 BRPM | OXYGEN SATURATION: 99 % | TEMPERATURE: 98.2 F

## 2020-09-24 PROCEDURE — 3331090001 HH PPS REVENUE CREDIT

## 2020-09-24 PROCEDURE — 3331090002 HH PPS REVENUE DEBIT

## 2020-09-24 PROCEDURE — G0299 HHS/HOSPICE OF RN EA 15 MIN: HCPCS

## 2020-09-25 ENCOUNTER — HOME CARE VISIT (OUTPATIENT)
Dept: SCHEDULING | Facility: HOME HEALTH | Age: 80
End: 2020-09-25
Payer: MEDICARE

## 2020-09-25 PROCEDURE — G0300 HHS/HOSPICE OF LPN EA 15 MIN: HCPCS

## 2020-09-25 PROCEDURE — 3331090001 HH PPS REVENUE CREDIT

## 2020-09-25 PROCEDURE — 3331090002 HH PPS REVENUE DEBIT

## 2020-09-26 PROCEDURE — 3331090002 HH PPS REVENUE DEBIT

## 2020-09-26 PROCEDURE — 3331090001 HH PPS REVENUE CREDIT

## 2020-09-27 VITALS
RESPIRATION RATE: 18 BRPM | HEART RATE: 62 BPM | SYSTOLIC BLOOD PRESSURE: 166 MMHG | DIASTOLIC BLOOD PRESSURE: 82 MMHG | TEMPERATURE: 97.4 F | OXYGEN SATURATION: 98 %

## 2020-09-27 PROCEDURE — 3331090001 HH PPS REVENUE CREDIT

## 2020-09-27 PROCEDURE — 3331090002 HH PPS REVENUE DEBIT

## 2020-09-28 PROCEDURE — 3331090002 HH PPS REVENUE DEBIT

## 2020-09-28 PROCEDURE — 3331090001 HH PPS REVENUE CREDIT

## 2020-09-29 ENCOUNTER — HOME CARE VISIT (OUTPATIENT)
Dept: SCHEDULING | Facility: HOME HEALTH | Age: 80
End: 2020-09-29
Payer: MEDICARE

## 2020-09-29 ENCOUNTER — HOME CARE VISIT (OUTPATIENT)
Dept: HOME HEALTH SERVICES | Facility: HOME HEALTH | Age: 80
End: 2020-09-29
Payer: MEDICARE

## 2020-09-29 PROCEDURE — 3331090001 HH PPS REVENUE CREDIT

## 2020-09-29 PROCEDURE — 3331090002 HH PPS REVENUE DEBIT

## 2020-09-30 PROCEDURE — 3331090001 HH PPS REVENUE CREDIT

## 2020-09-30 PROCEDURE — 3331090002 HH PPS REVENUE DEBIT

## 2020-10-01 ENCOUNTER — HOME CARE VISIT (OUTPATIENT)
Dept: SCHEDULING | Facility: HOME HEALTH | Age: 80
End: 2020-10-01
Payer: MEDICARE

## 2020-10-01 PROCEDURE — 3331090001 HH PPS REVENUE CREDIT

## 2020-10-01 PROCEDURE — G0300 HHS/HOSPICE OF LPN EA 15 MIN: HCPCS

## 2020-10-01 PROCEDURE — 3331090002 HH PPS REVENUE DEBIT

## 2020-10-02 ENCOUNTER — HOME CARE VISIT (OUTPATIENT)
Dept: SCHEDULING | Facility: HOME HEALTH | Age: 80
End: 2020-10-02
Payer: MEDICARE

## 2020-10-02 VITALS
DIASTOLIC BLOOD PRESSURE: 76 MMHG | TEMPERATURE: 97.8 F | OXYGEN SATURATION: 98 % | RESPIRATION RATE: 16 BRPM | HEART RATE: 76 BPM | SYSTOLIC BLOOD PRESSURE: 160 MMHG

## 2020-10-02 PROCEDURE — G0299 HHS/HOSPICE OF RN EA 15 MIN: HCPCS

## 2020-10-02 PROCEDURE — 3331090002 HH PPS REVENUE DEBIT

## 2020-10-02 PROCEDURE — 3331090001 HH PPS REVENUE CREDIT

## 2020-10-03 PROCEDURE — 3331090002 HH PPS REVENUE DEBIT

## 2020-10-03 PROCEDURE — 3331090001 HH PPS REVENUE CREDIT

## 2020-10-04 VITALS
SYSTOLIC BLOOD PRESSURE: 156 MMHG | TEMPERATURE: 98.1 F | DIASTOLIC BLOOD PRESSURE: 92 MMHG | OXYGEN SATURATION: 99 % | RESPIRATION RATE: 16 BRPM | HEART RATE: 68 BPM

## 2020-10-04 PROCEDURE — 3331090001 HH PPS REVENUE CREDIT

## 2020-10-04 PROCEDURE — 3331090002 HH PPS REVENUE DEBIT

## 2020-10-05 ENCOUNTER — HOME CARE VISIT (OUTPATIENT)
Dept: SCHEDULING | Facility: HOME HEALTH | Age: 80
End: 2020-10-05
Payer: MEDICARE

## 2020-10-05 VITALS
HEIGHT: 64 IN | RESPIRATION RATE: 16 BRPM | BODY MASS INDEX: 24.67 KG/M2 | OXYGEN SATURATION: 98 % | DIASTOLIC BLOOD PRESSURE: 80 MMHG | HEART RATE: 77 BPM | SYSTOLIC BLOOD PRESSURE: 146 MMHG | TEMPERATURE: 98 F

## 2020-10-05 PROCEDURE — 3331090002 HH PPS REVENUE DEBIT

## 2020-10-05 PROCEDURE — G0299 HHS/HOSPICE OF RN EA 15 MIN: HCPCS

## 2020-10-05 PROCEDURE — 3331090001 HH PPS REVENUE CREDIT

## 2020-10-05 RX ORDER — AMLODIPINE BESYLATE 10 MG/1
10 TABLET ORAL DAILY
Qty: 30 TAB | Refills: 11 | Status: SHIPPED | OUTPATIENT
Start: 2020-10-05 | End: 2021-01-04 | Stop reason: SDUPTHER

## 2020-10-06 ENCOUNTER — PATIENT OUTREACH (OUTPATIENT)
Dept: CASE MANAGEMENT | Age: 80
End: 2020-10-06

## 2020-10-06 PROCEDURE — 3331090001 HH PPS REVENUE CREDIT

## 2020-10-06 PROCEDURE — 3331090002 HH PPS REVENUE DEBIT

## 2020-10-06 NOTE — PROGRESS NOTES
Called patient and LM. Then called daughter, Dyana Webb, had seen in [facility] nurse note from yesterday that Nidhi's  had  from [place] and  was yesterday. Spoke briefly to Dyana Webb, she said she had just talked to her mother and suggested I try her mother's phone again. Did call patient, she answered. Says she is doing \"pretty good. \"  Hasn't checked her blood sugar today. Says she has misplaced her glucometer. \"somewhere in the house. \" She mentioned needing to buy a new one- suggested [employer] brand of glucometer at [facility] that is very inexpensive. Reports not much appetite, says has never been a big eater. Says she is taking her insulin. Expressed interest in the [facility] nurse had mentioned it to her as well. CTN did not see any recent appointments with pcp prior to today or in future. Patient says she does have an appointment with him coming up soon. CTN to send note to pcp to schedule appt asap to evaluate patient. Advised patient that I will check back with her . She thanked me for the call.

## 2020-10-07 ENCOUNTER — PATIENT OUTREACH (OUTPATIENT)
Dept: CASE MANAGEMENT | Age: 80
End: 2020-10-07

## 2020-10-07 ENCOUNTER — HOME CARE VISIT (OUTPATIENT)
Dept: HOME HEALTH SERVICES | Facility: HOME HEALTH | Age: 80
End: 2020-10-07
Payer: MEDICARE

## 2020-10-07 PROCEDURE — 3331090002 HH PPS REVENUE DEBIT

## 2020-10-07 PROCEDURE — 3331090001 HH PPS REVENUE CREDIT

## 2020-10-07 NOTE — PROGRESS NOTES
Patient's insurance is [employer] (ID [id number]) and [address] Medicaid CCCP. CTN called [employer], ph [phone], to speak to patient's care manager. Was told that Shaheen Vale is her CM and the  would send her an email requesting she contact me about concerns regarding the patient. Will see if Ms. Courtney Najjar can offer any resources to patient to assist with her health and safety/care.

## 2020-10-08 PROCEDURE — 3331090001 HH PPS REVENUE CREDIT

## 2020-10-08 PROCEDURE — 3331090002 HH PPS REVENUE DEBIT

## 2020-10-09 ENCOUNTER — HOME CARE VISIT (OUTPATIENT)
Dept: SCHEDULING | Facility: HOME HEALTH | Age: 80
End: 2020-10-09
Payer: MEDICARE

## 2020-10-09 PROCEDURE — 3331090002 HH PPS REVENUE DEBIT

## 2020-10-09 PROCEDURE — 400013 HH SOC

## 2020-10-09 PROCEDURE — 3331090001 HH PPS REVENUE CREDIT

## 2020-10-09 PROCEDURE — G0299 HHS/HOSPICE OF RN EA 15 MIN: HCPCS

## 2020-10-10 VITALS
TEMPERATURE: 98.7 F | DIASTOLIC BLOOD PRESSURE: 57 MMHG | OXYGEN SATURATION: 99 % | HEART RATE: 70 BPM | SYSTOLIC BLOOD PRESSURE: 116 MMHG | RESPIRATION RATE: 18 BRPM

## 2020-10-10 PROCEDURE — 3331090002 HH PPS REVENUE DEBIT

## 2020-10-10 PROCEDURE — 3331090001 HH PPS REVENUE CREDIT

## 2020-10-11 PROCEDURE — 3331090001 HH PPS REVENUE CREDIT

## 2020-10-11 PROCEDURE — 3331090002 HH PPS REVENUE DEBIT

## 2020-10-12 ENCOUNTER — HOME CARE VISIT (OUTPATIENT)
Dept: SCHEDULING | Facility: HOME HEALTH | Age: 80
End: 2020-10-12
Payer: MEDICARE

## 2020-10-12 ENCOUNTER — TELEPHONE (OUTPATIENT)
Dept: CASE MANAGEMENT | Age: 80
End: 2020-10-12

## 2020-10-12 ENCOUNTER — PATIENT OUTREACH (OUTPATIENT)
Dept: CASE MANAGEMENT | Age: 80
End: 2020-10-12

## 2020-10-12 PROCEDURE — 3331090001 HH PPS REVENUE CREDIT

## 2020-10-12 PROCEDURE — 3331090002 HH PPS REVENUE DEBIT

## 2020-10-12 NOTE — Clinical Note
See note about concerns for patient- inability to care for herself-daughter trying to get Medicaid, will ask our SW to assist with arranging Meals on Wheels.

## 2020-10-12 NOTE — PROGRESS NOTES
Called and spoke to patient. Says she is doing good today. Sugar has been good she said, maybe about 120, really couldn't remember. Suggested she write it down in a notebook or chart each time. Says she ate breakfast and lunch. No falls. Asked if she had been contacted by Mary Melgar, at [facility]. Patient said no, advised will call again and ask her to reach out to her to see if she can help in any way with resources. Called [employer] again phone [phone] and requested email message be sent to Mary Melgar re patient. as told by [facility] that they would send message to another CM. Transferred to [address] (ph [phone], ext [phone]). - who handles [facility] Medicare patients. Jadaakua was able to contact Jabari Soriano, on another call. Asked me to call her in about 10 minutes, ph [phone]. Called and spoke to patient's daughter, Jc Wills. Daughter had  for her  Oct 5 after passing away from Covid virus. Daughter overwhelmed, going to seek therapy and counseling. But wants to help mother. Did tell me she had meeting with Josefa Hendrix last week re Medicaid. They told her they would have an answer for her in 2 weeks about having an aide be with her mother 40 hours a week. Told Nidhi that I had been trying to reach her CM at [facility] and asked what other resources she could use. Jc Alvaradoore reports mother not cooking or fixing meals much anymore. Asked if Meals On Wheels could assist her. Advised will discuss with Nikolay  and our [address] [facility] team as well. Jc Wills thanked me for the call. Wished her well, gave her support and encouragement. Called and spoke with Mary Melgar at [facility]. Minnie Ronitt reports that she should be in the [address] term  CM program. Will discuss with her manager and call me back about what resources they can offer.  Did mention that patients usually get 10 meals after discharge from the hospital. Asked her if they could arrange for the 10 meals while our SW tries to set up Meals on [address] Landis said yes, could do so and verified patient;s address and phone number. CTN notified Doren Apley of the 10 meals and that will have [facility] SW assist with arranging Meals on Wheels. Daughter very grateful for the help. Referral sent to [address] to assist with patient, re Meals on Wheels.

## 2020-10-13 PROCEDURE — 3331090002 HH PPS REVENUE DEBIT

## 2020-10-13 PROCEDURE — 3331090001 HH PPS REVENUE CREDIT

## 2020-10-14 ENCOUNTER — PATIENT OUTREACH (OUTPATIENT)
Dept: CASE MANAGEMENT | Age: 80
End: 2020-10-14

## 2020-10-14 PROCEDURE — 3331090002 HH PPS REVENUE DEBIT

## 2020-10-14 PROCEDURE — 3331090001 HH PPS REVENUE CREDIT

## 2020-10-14 NOTE — PROGRESS NOTES
Goals Addressed                 This Visit's Progress     Supportive resources: medicaid assistance and options for caregiver support          10/14/2020   Follow up  SW received a referral from CTN requesting a follow up call with patient's daughter to assist her with signing patient up for Meals on Wheels program.  CTN contacted [facility] enrolling patient into their home delivered meals program (10 meals) following her recent discharge from hospital.  SW contacted daughter Mejia Llanes for follow up. Plan  SW completed the Meals on Wheels application online with daughter's assistance. Advise daughter to contact Meals on Wheels after 2 weeks if she has not received approval. Per Mejia Llanes, she is expecting to receive notification from [facility] regarding pt's status for LTC/personal care Medicaid coverage. SW explained to daughter the process for selecting  care agencies and advise her to follow up for assistance if needed. Daughter understood and voiced no additional questions or concerns during the call. Alvarez Pagan, [address] [place] [address] Transitions Team   [address] [place] [address] Ambulatory Care Coordination Team  [phone]     7/6/20  SW received referral from CTN requesting a follow up call with patient's daughter to offer assistance/support with establishing personal care and assistance for patient. Per CTN, a UAI screening was completed while pt was hospitalized and daughter was working with [facility] program to establish day program/Medicaid. SW left voice mails requesting a call back. SW will follow up once call is returned  Alvarez Pagan, [facility] Ambulatory Care Coordination Team  [phone]      2/26/2020   CT SW received a call from daughter discussing pt's plan. Daughter stated that she is having difficulty with getting in contact with [facility] to initiate UAI screening for personal care.   SW reviewed number that was provided and encouraged her to make another outreach attempt. YOLANDA also provided her with pt's Dering Halla CM contact information and explained the purpose/benefits for CM services through insurance provider. YOLANDA emailed her a blank ABIEL form to complete and fax to [facility] giving her permission to communicate on pt's behalf. She reported that pt is doing well and she is planning to assist her to PCP f/u appointment tomorrow. YOLANDA will follow up with daughter at a later date. [place] [address] Ambulatory Care Coordination Team  [phone]    2/25/20  CT YOLANDA left a message with patient's daughter requesting a call back. YOLANDA will provide daughter with pt's [employer]  name and number and discuss the process of submitting an authorization release to [facility] on behalf of the patient for caregiver purposes. 2/21/20  CT YOLANDA contacted patient's daughter and advised her to contact Liseth TONY to initiate Uniform Assessment Instrument screening for personal care/LTC assistance. Daughter reported that she submitted a medicaid application. Daughter in agreement with receiving assistance from PharmD for Diabetes education/tx options for patient. YOLANDA will update Sue Landers PharmD of this request.  Shine Navarrete will follow up at a later date to inquire about progress and offer assistance if needed. [place] [address] Ambulatory Care Coordination Team  [phone]    2/20/20  CT YOLANDA was notified of patient's admission to [facility]. YOLANDA left a message with pt's  Radha Ku requesting a uniform assessment screening for personal care/LTC services. YOLANDA will attempt contact at a later date if call is not returned. Daughter was also made aware of the UAI request.      2/18/20  YOLANDA discussed with daughter about the purpose and benefit of receiving Diabetes education from [address]. Daughter stated that she understands how to manage pt's medical condition, but the challenge is ensuring that pt eat her meals and take medications consistently. Daughter stated that she calls her mom several times during the day to remind her to take meds, eat meals etc.  SW discussed the option of patient attending an adult  program during the week to assist with needs until her personal care is established. Daughter stated that pt would not be interested in attending but she will share this with pt as an option. YOLANDA contacted  DSS to iniate the UAI screening for personal care. YOLANDA advised daughter to contact Mrs. Topher Genao at [facility] [phone] to provide additional information for screening. [place] [address] Ambulatory Care Coordination Team  [phone]    2/17/20  YOLANDA contacted daughter for f/u. Daughter reported that she/pt submitted the medicaid application. SW explained to daughter the f/u process and services offered though medicaid. SW/daughter also discussed the benefit of having pt's medications bubbled pack and advised her to contact pharmacy to inquire about the process. YOLANDA will f/u with Rosalio Mendez RD to coordinate a meeting for pt/daughter to receive diabetes education. SW will f/u at a later date. [place] [address] Ambulatory Care Coordination Team  [phone]  1/31/2020   YOLANDA attempted to contacted patient's daughter to follow up on progress made with completing medicaid application and offer assistance. YOLANDA left a voicemail requesting a call back. [place] [address] Ambulatory Care Coordination Team  [phone]    1/15/2020   CTN YOLANDA received a call back from [place] [address] providing an update on patients home visit.  YOLANDA met with patient in the home and spoke with the daughter via phone during the visit. Iggy Duran reported there were no concerns observed during the visit. Iggy Duran reported that patient was alert, verbal and oriented during the visit, as she appropriately shared information and answered questions. However, It is noted that [facility] created goals and interventions to address patients cognitive impairment. Iggy Duran discussed with daughter the importance of having Medicaid coverage in place, as it could provide personal care aides to assist patient in the home. SW explained to daughter the concerns that were shared regarding patients care in the home and offered to schedule a date and time to meet with her to complete a Medicaid application and submit a request for UAI screening, but daughter was not able to confirm a date.  YOLANDA will follow up with daughter at a later date to schedule meeting. See [facility] YOLANDA note. [facility] Coord[facility] Team  [phone]    1/10/2020 3:00pm  MARLEN GUERRERO received a call back from [facility] YOLANDA, Jc You. MARLEN GUERRERO updated her on the concerns related to patient's care and safety in the home. 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 According to patients daughter, patient owns her home and the son lives with her. Patients daughter is involved with her care but does not live with her. ALMA DELIA GUERRERO made aware of the resources that were provided to daughter regarding the Medicaid process and discussed the possibility of patient participating in [facility] chronic care program for disease management. ALMA DELIA GUERRERO is scheduled to conduct a home visit with patient next week. Due to patients cognition limitations, YOLANDA suggested that [facility] YOLANDA contact patients son/daughter to schedule a home visit.  MARLEN GUERRERO mentioned, pending the outcome of home assessment there may be a need for an APS referral.  SW acknowledged the report given and will follow up with CTN YOLANDA at a later date to discuss findings. [place] [address] Ambulatory Care Coordination Team  [phone]    1/9/2020 2:30am  [address] rep, reported that patient is scheduled to receive a home visit from [address] [place] [address] on 1-13-20. CTN YOLANDA called home health SW, Elroy Travis requesting a call back to discuss concerns reg patient's care/safety and address social needs. SW will attempt call at a later date if call is not returned. [place] [address] Ambulatory Care Coordination Team  [phone]    1/7/2020  1:00pm  It is noted that patient was recently discharged from the hospital due to DKA. SW contacted patient's daughter, Jocleyne Cruz, to discuss resources to address patient's needs. Daughter stated that she was busy, but was receptive to taking a few minutes to discuss options related to patient's care. SW explained to daughter the Medicaid process along with the benefits of Medicaid coverage. SW highlighted a few Medicaid services that patient could receive if approved, such as personal care, adult  coverage, assistance with medication cost and alternative placement if needed. Daughter acknowledged the services and stated that her goal is to care for patient in the home. Patient's son live with her and daughter assist with providing care. SW advised daughter to contact [address] [place] [address] to initiate the application process, as it could take up to 45 days for Medicaid approval.  Daughter understood and stated she and patient will complete the application by the end of the week. Daughter denied any additional resources at this time.  SW provided daughter my contact information to contact if needed      SW will contact daughter within the next two weeks to assess progress made with completing application, discuss ACP items and offer assistance if needed. [facility] Team   [facility] Ambulatory Care Coordination Team  [phone]     12/17/2019  1:30pm  SW attempted to contact patient's daughter for follow up. SW left a detailed voicemail requesting a call back, and suggested that she leave on my voicemail best time/date to reach her. SW will follow up at a later date. 12/13/2019  1:25pm  Patient's daughter requested assistance with understanding medicaid process and initiating application. SW contacted daughter however, she reported that she was unable to talk and stated she would contact SW [phone] at 1:30pm.      Panfilo Sauer will attempt contact on 12-16-19 if call is not returned. YOLANDA will offer assistance with medicaid process and additional resources if needed.         [facility] Team

## 2020-10-14 NOTE — DISCHARGE SUMMARY
[address] SUMMARY    Name:  Paige Verdugo  MR#:  [id number]  :  1940  ACCOUNT #:  [de-identified]  ADMIT DATE:  2020  DISCHARGE DATE:  2020    HISTORY OF PRESENT ILLNESS:  The patient is a 73-year-old lady presented to the emergency room because of hyperglycemia accompanied by generalized weakness and mild confusion a bit worse than her usual baseline. She has a long history of noncompliance and has been hospitalized repeatedly for DKA. The problem was an inadequate home situation that is not and apparently cannot be corrected. Past medical history, social history, review of system, family history, physical examination is as in admitting H and P.    LABORATORY VALUES:  The initial serum sodium is 131, CO2 6, blood sugar 867 with BUN and creatinine of 42 and 2.13 respectively. Alkaline phosphatase is 139. Initial lactic acid was 7.3, subsequently 6.1 and 2.5 at 14:52 on 2020. On 2020 at 14:52, CO2 was 26 and remained in the normal range from that point on. The initial hemoglobin is 10.1, white count 16.6, platelet count of 320 and differential as follows:  88 segs, 2 bands, 4 lymphocytes, 5 monocytes and 1 myelocyte. Subsequent white count the following day was 10.0. HOSPITAL COURSE:  The patient was placed on the PACU and was placed on insulin drip. This was obviously with the assist of the Glucommander. With this her ketoacidosis have progressively improved to the point where her ketoacidosis was broken. CO2 remained elevated. The patient was transitioned to insulin and remained on a basal bolus insulin dose, which was adjusted during the last 2 days of her hospital stay. Interestingly, her insulin requirements dropped and her initial discharge insulin dose had to be adjusted accordingly. At the time of discharge, she was back to her baseline.     Her social situation remains quite poor and unfortunately the assistance at home is just not there. Her course is complicated by her progressive dementia. FINAL DIAGNOSES:  1. Diabetic ketoacidosis. 2.  Diabetes mellitus type I.  3.  Progressive dementia. 4.  Primary hypertension. 5.  Dyslipidemia. 6.  Hypothyroidism. DISPOSITION:  1. The patient will be discharged home, ambulatory, on an ADA diet with bedtime snack. 2.  Discharge medications include the following: Alphagan 0.15% one drop both eyes b.i.d., pravastatin 80 mg at bedtime, Levoxyl 0.175 mg daily, clopidogrel 75 mg daily, metoprolol succinate 25 mg daily, losartan/HCTZ 100/25 one q.a.m., degludec 14 units at bedtime and Humalog insulin 3 units a.c. breakfast and lunch and 2 units a.c. dinner. [facility] the patient will return to the office in the next 3 to 5 days. 4.  I do not anticipate any meaningful change because the home environment is the exact same complicated by her progressive dementia and lack of adequate assistance in the home setting.         MD KENNETH Barclay/[email or web address]  D:  10/13/2020 21:49  T:  10/14/2020 12:42  JOB #:  [id number]

## 2020-10-15 ENCOUNTER — HOME CARE VISIT (OUTPATIENT)
Dept: SCHEDULING | Facility: HOME HEALTH | Age: 80
End: 2020-10-15
Payer: MEDICARE

## 2020-10-15 ENCOUNTER — HOME CARE VISIT (OUTPATIENT)
Dept: HOME HEALTH SERVICES | Facility: HOME HEALTH | Age: 80
End: 2020-10-15
Payer: MEDICARE

## 2020-10-15 VITALS
SYSTOLIC BLOOD PRESSURE: 124 MMHG | TEMPERATURE: 98.4 F | RESPIRATION RATE: 18 BRPM | HEART RATE: 74 BPM | DIASTOLIC BLOOD PRESSURE: 58 MMHG | OXYGEN SATURATION: 98 %

## 2020-10-15 PROCEDURE — 3331090002 HH PPS REVENUE DEBIT

## 2020-10-15 PROCEDURE — G0299 HHS/HOSPICE OF RN EA 15 MIN: HCPCS

## 2020-10-15 PROCEDURE — 3331090001 HH PPS REVENUE CREDIT

## 2020-10-16 PROCEDURE — 3331090002 HH PPS REVENUE DEBIT

## 2020-10-16 PROCEDURE — 3331090001 HH PPS REVENUE CREDIT

## 2020-10-17 PROCEDURE — 3331090002 HH PPS REVENUE DEBIT

## 2020-10-17 PROCEDURE — 3331090001 HH PPS REVENUE CREDIT

## 2020-10-18 PROCEDURE — 3331090001 HH PPS REVENUE CREDIT

## 2020-10-18 PROCEDURE — 3331090002 HH PPS REVENUE DEBIT

## 2020-10-19 ENCOUNTER — HOME CARE VISIT (OUTPATIENT)
Dept: SCHEDULING | Facility: HOME HEALTH | Age: 80
End: 2020-10-19
Payer: MEDICARE

## 2020-10-19 VITALS
OXYGEN SATURATION: 9 % | SYSTOLIC BLOOD PRESSURE: 140 MMHG | RESPIRATION RATE: 20 BRPM | HEART RATE: 100 BPM | TEMPERATURE: 98 F | DIASTOLIC BLOOD PRESSURE: 70 MMHG

## 2020-10-19 PROCEDURE — G0299 HHS/HOSPICE OF RN EA 15 MIN: HCPCS

## 2020-10-19 PROCEDURE — 3331090002 HH PPS REVENUE DEBIT

## 2020-10-19 PROCEDURE — 3331090001 HH PPS REVENUE CREDIT

## 2020-10-20 ENCOUNTER — APPOINTMENT (OUTPATIENT)
Dept: GENERAL RADIOLOGY | Age: 80
DRG: 638 | End: 2020-10-20
Attending: EMERGENCY MEDICINE
Payer: MEDICARE

## 2020-10-20 ENCOUNTER — APPOINTMENT (OUTPATIENT)
Dept: CT IMAGING | Age: 80
DRG: 638 | End: 2020-10-20
Attending: EMERGENCY MEDICINE
Payer: MEDICARE

## 2020-10-20 ENCOUNTER — HOME CARE VISIT (OUTPATIENT)
Dept: HOME HEALTH SERVICES | Facility: HOME HEALTH | Age: 80
End: 2020-10-20
Payer: MEDICARE

## 2020-10-20 ENCOUNTER — HOSPITAL ENCOUNTER (INPATIENT)
Age: 80
LOS: 5 days | Discharge: HOME HEALTH | DRG: 638 | End: 2020-10-25
Attending: EMERGENCY MEDICINE | Admitting: INTERNAL MEDICINE
Payer: MEDICARE

## 2020-10-20 DIAGNOSIS — E10.11 TYPE 1 DIABETES MELLITUS WITH KETOACIDOTIC COMA (HCC): Primary | ICD-10-CM

## 2020-10-20 DIAGNOSIS — G93.40 ACUTE ENCEPHALOPATHY: ICD-10-CM

## 2020-10-20 DIAGNOSIS — E87.20 METABOLIC ACIDOSIS: ICD-10-CM

## 2020-10-20 PROBLEM — R65.20 SEVERE SEPSIS (HCC): Status: ACTIVE | Noted: 2020-10-20

## 2020-10-20 PROBLEM — A41.9 SEVERE SEPSIS (HCC): Status: ACTIVE | Noted: 2020-10-20

## 2020-10-20 PROBLEM — E87.5 HYPERKALEMIA: Status: ACTIVE | Noted: 2020-10-20

## 2020-10-20 PROBLEM — B37.9 CANDIDIASIS: Status: ACTIVE | Noted: 2020-10-20

## 2020-10-20 PROBLEM — F01.50 VASCULAR DEMENTIA WITHOUT BEHAVIORAL DISTURBANCE (HCC): Chronic | Status: ACTIVE | Noted: 2018-10-20

## 2020-10-20 PROBLEM — E10.10 DKA, TYPE 1 (HCC): Status: ACTIVE | Noted: 2020-10-20

## 2020-10-20 LAB
ALBUMIN SERPL-MCNC: 2.9 G/DL (ref 3.5–5)
ALBUMIN/GLOB SERPL: 0.8 {RATIO} (ref 1.1–2.2)
ALP SERPL-CCNC: 205 U/L (ref 45–117)
ALT SERPL-CCNC: 111 U/L (ref 12–78)
ANION GAP SERPL CALC-SCNC: ABNORMAL MMOL/L (ref 5–15)
APPEARANCE UR: CLEAR
AST SERPL-CCNC: 62 U/L (ref 15–37)
BACTERIA URNS QL MICRO: NEGATIVE /HPF
BASOPHILS # BLD: 0.3 K/UL (ref 0–0.1)
BASOPHILS NFR BLD: 1 % (ref 0–1)
BILIRUB SERPL-MCNC: 0.5 MG/DL (ref 0.2–1)
BILIRUB UR QL: NEGATIVE
BUN SERPL-MCNC: 29 MG/DL (ref 6–20)
BUN/CREAT SERPL: 16 (ref 12–20)
CALCIUM SERPL-MCNC: 8.5 MG/DL (ref 8.5–10.1)
CHLORIDE SERPL-SCNC: 100 MMOL/L (ref 97–108)
CO2 SERPL-SCNC: <5 MMOL/L (ref 21–32)
COLOR UR: ABNORMAL
COVID-19 RAPID TEST, COVR: NOT DETECTED
CREAT SERPL-MCNC: 1.8 MG/DL (ref 0.55–1.02)
DIFFERENTIAL METHOD BLD: ABNORMAL
EOSINOPHIL # BLD: 0 K/UL (ref 0–0.4)
EOSINOPHIL NFR BLD: 0 % (ref 0–7)
EPITH CASTS URNS QL MICRO: ABNORMAL /LPF
ERYTHROCYTE [DISTWIDTH] IN BLOOD BY AUTOMATED COUNT: 16.3 % (ref 11.5–14.5)
GLOBULIN SER CALC-MCNC: 3.5 G/DL (ref 2–4)
GLUCOSE BLD STRIP.AUTO-MCNC: 529 MG/DL (ref 65–100)
GLUCOSE BLD STRIP.AUTO-MCNC: 567 MG/DL (ref 65–100)
GLUCOSE BLD STRIP.AUTO-MCNC: >600 MG/DL (ref 65–100)
GLUCOSE SERPL-MCNC: 838 MG/DL (ref 65–100)
GLUCOSE UR STRIP.AUTO-MCNC: >1000 MG/DL
HCT VFR BLD AUTO: 36.8 % (ref 35–47)
HEALTH STATUS, XMCV2T: NORMAL
HGB BLD-MCNC: 11 G/DL (ref 11.5–16)
HGB UR QL STRIP: ABNORMAL
IMM GRANULOCYTES # BLD AUTO: 0 K/UL (ref 0–0.04)
IMM GRANULOCYTES NFR BLD AUTO: 0 % (ref 0–0.5)
KETONES UR QL STRIP.AUTO: 80 MG/DL
LACTATE SERPL-SCNC: 5.2 MMOL/L (ref 0.4–2)
LEUKOCYTE ESTERASE UR QL STRIP.AUTO: NEGATIVE
LIPASE SERPL-CCNC: 36 U/L (ref 73–393)
LYMPHOCYTES # BLD: 1.8 K/UL (ref 0.8–3.5)
LYMPHOCYTES NFR BLD: 6 % (ref 12–49)
MAGNESIUM SERPL-MCNC: 2.2 MG/DL (ref 1.6–2.4)
MCH RBC QN AUTO: 31.2 PG (ref 26–34)
MCHC RBC AUTO-ENTMCNC: 29.9 G/DL (ref 30–36.5)
MCV RBC AUTO: 104.2 FL (ref 80–99)
MONOCYTES # BLD: 1.2 K/UL (ref 0–1)
MONOCYTES NFR BLD: 4 % (ref 5–13)
NEUTS BAND NFR BLD MANUAL: 1 %
NEUTS SEG # BLD: 27.4 K/UL (ref 1.8–8)
NEUTS SEG NFR BLD: 88 % (ref 32–75)
NITRITE UR QL STRIP.AUTO: NEGATIVE
NRBC # BLD: 0.02 K/UL (ref 0–0.01)
NRBC BLD-RTO: 0.1 PER 100 WBC
PH UR STRIP: 5 [PH] (ref 5–8)
PHOSPHATE SERPL-MCNC: 7.6 MG/DL (ref 2.6–4.7)
PLATELET # BLD AUTO: 367 K/UL (ref 150–400)
PMV BLD AUTO: 9.9 FL (ref 8.9–12.9)
POTASSIUM SERPL-SCNC: 6 MMOL/L (ref 3.5–5.1)
PROT SERPL-MCNC: 6.4 G/DL (ref 6.4–8.2)
PROT UR STRIP-MCNC: 30 MG/DL
RBC # BLD AUTO: 3.53 M/UL (ref 3.8–5.2)
RBC #/AREA URNS HPF: ABNORMAL /HPF (ref 0–5)
RBC MORPH BLD: ABNORMAL
SERVICE CMNT-IMP: ABNORMAL
SODIUM SERPL-SCNC: 134 MMOL/L (ref 136–145)
SOURCE, COVRS: NORMAL
SP GR UR REFRACTOMETRY: 1.02 (ref 1–1.03)
SPECIMEN SOURCE, FCOV2M: NORMAL
SPECIMEN TYPE, XMCV1T: NORMAL
UA: UC IF INDICATED,UAUC: ABNORMAL
UROBILINOGEN UR QL STRIP.AUTO: 0.2 EU/DL (ref 0.2–1)
WBC # BLD AUTO: 30.7 K/UL (ref 3.6–11)
WBC URNS QL MICRO: ABNORMAL /HPF (ref 0–4)
YEAST URNS QL MICRO: PRESENT

## 2020-10-20 PROCEDURE — 74011000250 HC RX REV CODE- 250: Performed by: INTERNAL MEDICINE

## 2020-10-20 PROCEDURE — 74011000258 HC RX REV CODE- 258: Performed by: EMERGENCY MEDICINE

## 2020-10-20 PROCEDURE — 3331090001 HH PPS REVENUE CREDIT

## 2020-10-20 PROCEDURE — 74177 CT ABD & PELVIS W/CONTRAST: CPT

## 2020-10-20 PROCEDURE — 80047 BASIC METABLC PNL IONIZED CA: CPT

## 2020-10-20 PROCEDURE — 83735 ASSAY OF MAGNESIUM: CPT

## 2020-10-20 PROCEDURE — 74011250636 HC RX REV CODE- 250/636: Performed by: INTERNAL MEDICINE

## 2020-10-20 PROCEDURE — 74011000250 HC RX REV CODE- 250: Performed by: EMERGENCY MEDICINE

## 2020-10-20 PROCEDURE — 74011000636 HC RX REV CODE- 636: Performed by: EMERGENCY MEDICINE

## 2020-10-20 PROCEDURE — 85025 COMPLETE CBC W/AUTO DIFF WBC: CPT

## 2020-10-20 PROCEDURE — 82803 BLOOD GASES ANY COMBINATION: CPT

## 2020-10-20 PROCEDURE — 84100 ASSAY OF PHOSPHORUS: CPT

## 2020-10-20 PROCEDURE — 74011250636 HC RX REV CODE- 250/636

## 2020-10-20 PROCEDURE — 94640 AIRWAY INHALATION TREATMENT: CPT

## 2020-10-20 PROCEDURE — 36415 COLL VENOUS BLD VENIPUNCTURE: CPT

## 2020-10-20 PROCEDURE — 82962 GLUCOSE BLOOD TEST: CPT

## 2020-10-20 PROCEDURE — 83605 ASSAY OF LACTIC ACID: CPT

## 2020-10-20 PROCEDURE — 70450 CT HEAD/BRAIN W/O DYE: CPT

## 2020-10-20 PROCEDURE — 96374 THER/PROPH/DIAG INJ IV PUSH: CPT

## 2020-10-20 PROCEDURE — 81001 URINALYSIS AUTO W/SCOPE: CPT

## 2020-10-20 PROCEDURE — 87040 BLOOD CULTURE FOR BACTERIA: CPT

## 2020-10-20 PROCEDURE — 87635 SARS-COV-2 COVID-19 AMP PRB: CPT

## 2020-10-20 PROCEDURE — 93005 ELECTROCARDIOGRAM TRACING: CPT

## 2020-10-20 PROCEDURE — 74011000258 HC RX REV CODE- 258: Performed by: INTERNAL MEDICINE

## 2020-10-20 PROCEDURE — 80053 COMPREHEN METABOLIC PANEL: CPT

## 2020-10-20 PROCEDURE — 83690 ASSAY OF LIPASE: CPT

## 2020-10-20 PROCEDURE — 74011636637 HC RX REV CODE- 636/637: Performed by: EMERGENCY MEDICINE

## 2020-10-20 PROCEDURE — 65610000006 HC RM INTENSIVE CARE

## 2020-10-20 PROCEDURE — 74011250636 HC RX REV CODE- 250/636: Performed by: EMERGENCY MEDICINE

## 2020-10-20 PROCEDURE — 71045 X-RAY EXAM CHEST 1 VIEW: CPT

## 2020-10-20 PROCEDURE — 3331090002 HH PPS REVENUE DEBIT

## 2020-10-20 PROCEDURE — 74011636637 HC RX REV CODE- 636/637: Performed by: INTERNAL MEDICINE

## 2020-10-20 PROCEDURE — 99285 EMERGENCY DEPT VISIT HI MDM: CPT

## 2020-10-20 RX ORDER — SODIUM BICARBONATE IN D5W 150/1000ML
PLASTIC BAG, INJECTION (ML) INTRAVENOUS ONCE
Status: DISCONTINUED | OUTPATIENT
Start: 2020-10-20 | End: 2020-10-20

## 2020-10-20 RX ORDER — ONDANSETRON 4 MG/1
4 TABLET, ORALLY DISINTEGRATING ORAL
Status: DISCONTINUED | OUTPATIENT
Start: 2020-10-20 | End: 2020-10-25 | Stop reason: HOSPADM

## 2020-10-20 RX ORDER — MAGNESIUM SULFATE 100 %
4 CRYSTALS MISCELLANEOUS AS NEEDED
Status: DISCONTINUED | OUTPATIENT
Start: 2020-10-20 | End: 2020-10-21

## 2020-10-20 RX ORDER — CALCIUM GLUCONATE 94 MG/ML
INJECTION, SOLUTION INTRAVENOUS
Status: COMPLETED
Start: 2020-10-20 | End: 2020-10-20

## 2020-10-20 RX ORDER — HEPARIN SODIUM 5000 [USP'U]/ML
5000 INJECTION, SOLUTION INTRAVENOUS; SUBCUTANEOUS EVERY 8 HOURS
Status: DISCONTINUED | OUTPATIENT
Start: 2020-10-20 | End: 2020-10-25 | Stop reason: HOSPADM

## 2020-10-20 RX ORDER — SODIUM CHLORIDE 0.9 % (FLUSH) 0.9 %
5-40 SYRINGE (ML) INJECTION EVERY 8 HOURS
Status: DISCONTINUED | OUTPATIENT
Start: 2020-10-20 | End: 2020-10-25 | Stop reason: HOSPADM

## 2020-10-20 RX ORDER — INSULIN LISPRO 100 [IU]/ML
INJECTION, SOLUTION INTRAVENOUS; SUBCUTANEOUS
Status: DISCONTINUED | OUTPATIENT
Start: 2020-10-21 | End: 2020-10-21

## 2020-10-20 RX ORDER — DEXTROSE 50 % IN WATER (D50W) INTRAVENOUS SYRINGE
25-50 AS NEEDED
Status: DISCONTINUED | OUTPATIENT
Start: 2020-10-20 | End: 2020-10-21

## 2020-10-20 RX ORDER — CLOPIDOGREL BISULFATE 75 MG/1
75 TABLET ORAL DAILY
Status: DISCONTINUED | OUTPATIENT
Start: 2020-10-21 | End: 2020-10-25 | Stop reason: HOSPADM

## 2020-10-20 RX ORDER — ONDANSETRON 2 MG/ML
4 INJECTION INTRAMUSCULAR; INTRAVENOUS
Status: DISCONTINUED | OUTPATIENT
Start: 2020-10-20 | End: 2020-10-25 | Stop reason: HOSPADM

## 2020-10-20 RX ORDER — AMLODIPINE BESYLATE 5 MG/1
10 TABLET ORAL DAILY
Status: DISCONTINUED | OUTPATIENT
Start: 2020-10-21 | End: 2020-10-25 | Stop reason: HOSPADM

## 2020-10-20 RX ORDER — SODIUM CHLORIDE 0.9 % (FLUSH) 0.9 %
5-10 SYRINGE (ML) INJECTION AS NEEDED
Status: DISCONTINUED | OUTPATIENT
Start: 2020-10-20 | End: 2020-10-24 | Stop reason: SDUPTHER

## 2020-10-20 RX ORDER — SODIUM CHLORIDE 0.9 % (FLUSH) 0.9 %
10 SYRINGE (ML) INJECTION
Status: COMPLETED | OUTPATIENT
Start: 2020-10-20 | End: 2020-10-20

## 2020-10-20 RX ORDER — BRIMONIDINE TARTRATE 2 MG/ML
1 SOLUTION/ DROPS OPHTHALMIC 2 TIMES DAILY
Status: DISCONTINUED | OUTPATIENT
Start: 2020-10-20 | End: 2020-10-25 | Stop reason: HOSPADM

## 2020-10-20 RX ORDER — METOPROLOL SUCCINATE 25 MG/1
25 TABLET, EXTENDED RELEASE ORAL DAILY
Status: DISCONTINUED | OUTPATIENT
Start: 2020-10-21 | End: 2020-10-25 | Stop reason: HOSPADM

## 2020-10-20 RX ORDER — ALBUTEROL SULFATE 2.5 MG/.5ML
10 SOLUTION RESPIRATORY (INHALATION) CONTINUOUS
Status: DISCONTINUED | OUTPATIENT
Start: 2020-10-20 | End: 2020-10-21 | Stop reason: ALTCHOICE

## 2020-10-20 RX ORDER — CALCIUM GLUCONATE 94 MG/ML
1 INJECTION, SOLUTION INTRAVENOUS
Status: DISCONTINUED | OUTPATIENT
Start: 2020-10-20 | End: 2020-10-20

## 2020-10-20 RX ORDER — FLUCONAZOLE 2 MG/ML
200 INJECTION, SOLUTION INTRAVENOUS DAILY
Status: DISCONTINUED | OUTPATIENT
Start: 2020-10-21 | End: 2020-10-22

## 2020-10-20 RX ORDER — SODIUM CHLORIDE 0.9 % (FLUSH) 0.9 %
5-40 SYRINGE (ML) INJECTION AS NEEDED
Status: DISCONTINUED | OUTPATIENT
Start: 2020-10-20 | End: 2020-10-25 | Stop reason: HOSPADM

## 2020-10-20 RX ADMIN — SODIUM CHLORIDE 1000 ML: 900 INJECTION, SOLUTION INTRAVENOUS at 18:19

## 2020-10-20 RX ADMIN — SODIUM BICARBONATE: 84 INJECTION, SOLUTION INTRAVENOUS at 19:41

## 2020-10-20 RX ADMIN — CALCIUM GLUCONATE 1 G: 98 INJECTION, SOLUTION INTRAVENOUS at 18:59

## 2020-10-20 RX ADMIN — CALCIUM GLUCONATE 1 G: 98 INJECTION, SOLUTION INTRAVENOUS at 19:05

## 2020-10-20 RX ADMIN — SODIUM CHLORIDE 10.8 UNITS/HR: 9 INJECTION, SOLUTION INTRAVENOUS at 19:29

## 2020-10-20 RX ADMIN — HEPARIN SODIUM 5000 UNITS: 5000 INJECTION INTRAVENOUS; SUBCUTANEOUS at 23:09

## 2020-10-20 RX ADMIN — PIPERACILLIN AND TAZOBACTAM 3.38 G: 3; .375 INJECTION, POWDER, LYOPHILIZED, FOR SOLUTION INTRAVENOUS at 19:10

## 2020-10-20 RX ADMIN — SODIUM CHLORIDE 1000 ML: 900 INJECTION, SOLUTION INTRAVENOUS at 18:20

## 2020-10-20 RX ADMIN — HUMAN INSULIN 10 UNITS: 100 INJECTION, SOLUTION SUBCUTANEOUS at 18:43

## 2020-10-20 RX ADMIN — Medication 10 ML: at 20:33

## 2020-10-20 RX ADMIN — IOPAMIDOL 100 ML: 755 INJECTION, SOLUTION INTRAVENOUS at 20:33

## 2020-10-20 RX ADMIN — BRIMONIDINE TARTRATE 1 DROP: 2 SOLUTION/ DROPS OPHTHALMIC at 23:42

## 2020-10-20 RX ADMIN — ALBUTEROL SULFATE 10 MG/HR: 2.5 SOLUTION RESPIRATORY (INHALATION) at 18:24

## 2020-10-20 NOTE — H&P
Hospitalist Admission Note    NAME: Bess Goldberg   :  1940   MRN:  [id number]     Date/Time:  10/20/2020 6:51 PM    Patient PCP: Shruthi Bautista MD  ______________________________________________________________________  Given the patient's current clinical presentation, I have a high level of concern for decompensation if discharged from the emergency department. Complex decision making was performed, which includes reviewing the patient's available past medical records, laboratory results, and x-ray films. My assessment of this patient's clinical condition and my plan of care is as follows. Assessment / Plan:  Acute encephalopathy POA-Kussmaul breathing pattern  Due to diabetic ketoacidosis POA  History of diabetes mellitus type 1 POA  High anion gap metabolic acidosis POA due to DKA  Severe Hyperkalemia POA  Acute kidney injury POA-due to dehydration  Suspected Severe sepsis POA  UA positive for yeast  Chest x-ray negative for any acute findings  WBC 30 K  Lactate 5.2  LFTs 111/62/205  Potassium 6.0  Creatinine 1.8  CT abdomen and pelvis= pending  CT head pending  Blood culture pending history    Admit to ICU bed  Status post IV calcium ,  IV insulin, albuterol neb x1 in ER  Cont IV insulin. S/p IVF bolus in ER, cont Bicarb drip as started in ER  Recheck BMP every 4 hours till AG closed  Creatinine at baseline is normal but currently 1.8 likely from dehydration. Continue aggressive IV hydration and repeat BMP in a.m.   Screening COVID rapid test being done in ER- isolation/droplet plus precautions till it comes back negative  Empiric IV antibiotic along with IV Diflucan for now  Follow lactate till  less than 2.0     Hypertension  Dyslipidemia  Hypothyroidism  History of dementia  History of CVA  -Continue home metoprolol, Norvasc  -Continue home Pravachol  -Continue home levothyroxine  -Continue supportive care for dementia  -Continue home Plavix     Code Status: Full code  Surrogate Decision Maker: 2 sons and 1 daughter     DVT Prophylaxis: Heparin  GI Prophylaxis: not indicated     Baseline: From home, has dementia at baseline but is independent of ADLs      Subjective:   CHIEF COMPLAINT: AMS with High blood sugar at home    HISTORY OF PRESENT ILLNESS:     Stevenson Costa is a 67 y.o.  female who presents with above complains from home via EMS. Pt presents with chief complaints of abnormal blood sugars along with AMS at home today. EMS was called & they noticed her blood sugars to be very high and she was altered with her MS. Pt is a known frequently admitted to Hospital for DKA due to poor social condition at home.      On arrival to the hospital, her vital signs are within normal limits but was breathing 30/min due to kussmaul breathing due to severe metabolic acidosis with Bicarb <5 on BMP with Sugar in 800's with elevated WBC at 30 k with obvious Yeast in the Genitourianry tract area.       We were asked to admit for work up and evaluation of the above problems. Past Medical History:   Diagnosis Date    Heart failure ([facility] [address]     unknown to family    Hypertension     Stroke [facility])         Past Surgical History:   Procedure Laterality Date    HX GYN      HX HEENT      thyroidectomy    HX HYSTERECTOMY      REMOVAL GALLBLADDER      THYROIDECTOMY         Social History     Tobacco Use    Smoking status: Never Smoker    Smokeless tobacco: Never Used   Substance Use Topics    Alcohol use: No     Comment: been years        Family History   Problem Relation Age of Onset    Diabetes Father     No Known Problems Mother      No Known Allergies     Prior to Admission medications    Medication Sig Start Date End Date Taking? Authorizing Provider   amLODIPine (NORVASC) 10 mg tablet Take 1 Tab by mouth daily. 10/5/20   Jerzy Sinclair MD   insulin degludec [place] FlexTouch U-100) 100 unit/mL (3 mL) inpn 14 Units by SubCUTAneous route daily.  9/21/20   Ashanti Brewster Melissa Mathew MD   insulin lispro (HUMALOG) 100 unit/mL kwikpen 3 units before breakfast and lunch,and 2 units before dinner 9/21/20   Loc Edwards MD   zinc oxide 20 % ointment Apply 1 Applicator to affected area two (2) times a day. thin layer gluteal cleft    Provider, Historical   losartan-hydroCHLOROthiazide (HYZAAR) 100-25 mg per tablet Take 1 Tab by mouth daily. Provider, Historical   metoprolol succinate (TOPROL-XL) 25 mg XL tablet TAKE 1 TABLET BY MOUTH EVERY DAY 5/11/20   Loc Edwards MD   pravastatin (PRAVACHOL) 80 mg tablet TAKE 1 TABLET BY MOUTH at bedtime 2/27/20   Loc Edwards MD   levothyroxine (SYNTHROID) 175 mcg tablet TAKE 1 TABLET BY MOUTH EVERY DAY 2/27/20   Loc Edwards MD   clopidogreL (PLAVIX) 75 mg tab TAKE 1 TABLET BY MOUTH EVERY DAY 2/27/20   Loc Edwards MD   flash glucose sensor (FREESTYLE KEVON 14 DAY SENSOR) kit Continuous monitoring 2/27/20   Loc Edwards MD   brimonidine (ALPHAGAN) 0.15 % ophthalmic solution Administer 1 Drop to both eyes two (2) times a day. 1/30/15   Provider, Historical       REVIEW OF SYSTEMS:     I am not able to complete the review of systems because: The patient is intubated and sedated   x The patient has altered mental status due to his acute medical problems    The patient has baseline aphasia from prior stroke(s)   x The patient has baseline dementia and is not reliable historian    The patient is in acute medical distress and unable to provide information             Objective:   VITALS:    Visit Vitals  BP (!) 124/32   Pulse 100   Temp 98.8 °F (37.1 °C)   Resp 26   Wt 66.9 kg (147 lb 7.8 oz)   SpO2 94%   BMI 25.32 kg/m²       PHYSICAL EXAM:    General:    Lethargic on albuterol nebulizer, moderate distress, appears stated age.      HEENT: Atraumatic, anicteric sclerae, pink conjunctivae     No oral ulcers, mucosa Dry +, throat clear, dentition fair  Neck:  Supple, symmetrical,  thyroid: non tender  Lungs:   Clear to auscultation bilaterally. No Wheezing or Rhonchi. No rales. Chest wall:  No tenderness  No Accessory muscle use. Heart:   Regular  rhythm,  No  murmur   No edema  Abdomen:   Soft, non-tender. Not distended. Bowel sounds normal  Extremities: No cyanosis. No clubbing,      Skin turgor normal, Capillary refill normal, Radial dial pulse 2+  Skin:     Not pale. Not Jaundiced  No rashes   Psych:  Poor insight. Neurologic: EOMs intact. No facial asymmetry. No aphasia or slurred speech. Symmetrical strength, Sensation grossly intact. Alert and oriented X 0.     _______________________________________________________________________  Care Plan discussed with:    Comments   Patient x    Family      RN x    Care Manager                    Consultant:  genie Wallis   _______________________________________________________________________  Expected  Disposition:   Home with Family    HH/PT/OT/RN x   SNF/LTC ? [facility]    ________________________________________________________________________  TOTAL TIME:  65 Minutes    Critical Care Provided  72   Minutes non procedure based      Comments    x Reviewed previous records   >50% of visit spent in counseling and coordination of care  Discussion with patient and/or family and questions answered       ________________________________________________________________________  Signed: Jennifer Acevedo MD    Procedures: see electronic medical records for all procedures/Xrays and details which were not copied into this note but were reviewed prior to creation of Plan.     LAB DATA REVIEWED:    Recent Results (from the past 24 hour(s))   GLUCOSE, POC    Collection Time: 10/20/20  5:29 PM   Result Value Ref Range    Glucose (POC) >600 (HH) 65 - 100 mg/dL    Performed by Tiesha TORO (PCT)    GLUCOSE, POC    Collection Time: 10/20/20  5:31 PM   Result Value Ref Range    Glucose (POC) >600 (HH) 65 - 100 mg/dL    Performed by Ashlee Leonard (PCT)    EKG, 12 LEAD, INITIAL    Collection Time: 10/20/20  5:34 PM   Result Value Ref Range    Ventricular Rate 104 BPM    Atrial Rate 104 BPM    P-R Interval 150 ms    QRS Duration 88 ms    Q-T Interval 358 ms    QTC Calculation (Bezet) 470 ms    Calculated P Axis 38 degrees    Calculated R Axis -43 degrees    Calculated T Axis -6 degrees    Diagnosis       Sinus tachycardia  Possible Left atrial enlargement  Left axis deviation  Possible Anterior infarct (cited on or before 20-OCT-2020)  When compared with ECG of 16-SEP-2020 04:24,  Questionable change in initial forces of Septal leads  Inverted T waves have replaced nonspecific T wave abnormality in Inferior   leads     CBC WITH AUTOMATED DIFF    Collection Time: 10/20/20  5:48 PM   Result Value Ref Range    WBC 30.7 (H) 3.6 - 11.0 K/uL    RBC 3.53 (L) 3.80 - 5.20 M/uL    HGB 11.0 (L) 11.5 - 16.0 g/dL    HCT 36.8 35.0 - 47.0 %    .2 (H) 80.0 - 99.0 FL    MCH 31.2 26.0 - 34.0 PG    MCHC 29.9 (L) 30.0 - 36.5 g/dL    RDW 16.3 (H) 11.5 - 14.5 %    PLATELET 663 398 - 949 K/uL    MPV 9.9 8.9 - 12.9 FL    NRBC 0.1 (H) 0  WBC    ABSOLUTE NRBC 0.02 (H) 0.00 - 0.01 K/uL    NEUTROPHILS 88 (H) 32 - 75 %    BAND NEUTROPHILS 1 %    LYMPHOCYTES 6 (L) 12 - 49 %    MONOCYTES 4 (L) 5 - 13 %    EOSINOPHILS 0 0 - 7 %    BASOPHILS 1 0 - 1 %    IMMATURE GRANULOCYTES 0 0.0 - 0.5 %    ABS. NEUTROPHILS 27.4 (H) 1.8 - 8.0 K/UL    ABS. LYMPHOCYTES 1.8 0.8 - 3.5 K/UL    ABS. MONOCYTES 1.2 (H) 0.0 - 1.0 K/UL    ABS. EOSINOPHILS 0.0 0.0 - 0.4 K/UL    ABS. BASOPHILS 0.3 (H) 0.0 - 0.1 K/UL    ABS. IMM.  GRANS. 0.0 0.00 - 0.04 K/UL    DF AUTOMATED      RBC COMMENTS MACROCYTOSIS  PRESENT       METABOLIC PANEL, COMPREHENSIVE    Collection Time: 10/20/20  5:48 PM   Result Value Ref Range    Sodium 134 (L) 136 - 145 mmol/L    Potassium 6.0 (H) 3.5 - 5.1 mmol/L    Chloride 100 97 - 108 mmol/L    CO2 <5 (LL) 21 - 32 mmol/L    Anion gap Cannot be calculated 5 - 15 mmol/L    Glucose 838 (HH) 65 - 100 mg/dL    BUN 29 (H) 6 - 20 MG/DL Creatinine 1.80 (H) 0.55 - 1.02 MG/DL    BUN/Creatinine ratio 16 12 - 20      GFR est AA 33 (L) >60 ml/min/1.73m2    GFR est non-AA 27 (L) >60 ml/min/1.73m2    Calcium 8.5 8.5 - 10.1 MG/DL    Bilirubin, total 0.5 0.2 - 1.0 MG/DL    ALT (SGPT) 111 (H) 12 - 78 U/L    AST (SGOT) 62 (H) 15 - 37 U/L    Alk.  phosphatase 205 (H) 45 - 117 U/L    Protein, total 6.4 6.4 - 8.2 g/dL    Albumin 2.9 (L) 3.5 - 5.0 g/dL    Globulin 3.5 2.0 - 4.0 g/dL    A-G Ratio 0.8 (L) 1.1 - 2.2     MAGNESIUM    Collection Time: 10/20/20  5:48 PM   Result Value Ref Range    Magnesium 2.2 1.6 - 2.4 mg/dL   PHOSPHORUS    Collection Time: 10/20/20  5:48 PM   Result Value Ref Range    Phosphorus 7.6 (H) 2.6 - 4.7 MG/DL   LIPASE    Collection Time: 10/20/20  5:48 PM   Result Value Ref Range    Lipase 36 (L) 73 - 393 U/L   LACTIC ACID    Collection Time: 10/20/20  5:50 PM   Result Value Ref Range    Lactic acid 5.2 (HH) 0.4 - 2.0 MMOL/L   URINALYSIS W/ REFLEX CULTURE    Collection Time: 10/20/20  5:50 PM    Specimen: Urine   Result Value Ref Range    Color YELLOW/STRAW      Appearance CLEAR CLEAR      Specific gravity 1.024 1.003 - 1.030      pH (UA) 5.0 5.0 - 8.0      Protein 30 (A) NEG mg/dL    Glucose >1,000 (A) NEG mg/dL    Ketone 80 (A) NEG mg/dL    Bilirubin Negative NEG      Blood TRACE (A) NEG      Urobilinogen 0.2 0.2 - 1.0 EU/dL    Nitrites Negative NEG      Leukocyte Esterase Negative NEG      WBC 0-4 0 - 4 /hpf    RBC 0-5 0 - 5 /hpf    Epithelial cells FEW FEW /lpf    Bacteria Negative NEG /hpf    UA:UC IF INDICATED CULTURE NOT INDICATED BY UA RESULT CNI      Yeast PRESENT (A) NEG

## 2020-10-20 NOTE — PROGRESS NOTES
Pharmacy Automatic Renal Dosing Protocol - Antimicrobials    Indication for Antimicrobials: Sepsis of Unknown Origin and UTI    Current Regimen of Each Antimicrobial:  Piperacillin tazobactam 3.375 grams Q8H (Start Date 10/20; Day # 1)  Fluconazole 200 mg daily x 3 (Start Date 10/20, Day 1)    Previous Antimicrobial Therapy:    Vancomycin Goal Level:     Vancomycin Levels  Date Dose & Interval Measured (mcg/mL) Steady State (mcg/mL)                       Date & time of next level:     Significant Cultures:     Radiology / Imaging results: (X-ray, CT scan or MRI):       Labs:  Recent Labs     10/20/20  1748   CREA 1.80*   BUN 29*   WBC 30.7*     Temp (24hrs), Av.8 °F (37.1 °C), Min:98.8 °F (37.1 °C), Max:98.8 °F (37.1 °C)      Paralysis, amputations, malnutrition:   Creatinine Clearance (mL/min) or Dialysis: 24.2    Impression/Plan:   Antibiotics as above  BMP Q4H     Pharmacy will follow daily and adjust medications as appropriate for renal function and/or serum levels. Thank you,  Cindy Hills, [facility]      Recommended duration of therapy  [email or web address]/[facility]/Pharmacy/Clinical%20Companion/[email or web address]. [email or web address]    Renal Dosing  [email or web address]/[facility]/Pharmacy/Clinical%20Companion/Renal%[email or web address]. pdf

## 2020-10-20 NOTE — ED PROVIDER NOTES
EMERGENCY DEPARTMENT HISTORY AND PHYSICAL EXAM      Date: 10/20/2020  Patient Name: Alan Dominguez    Please note that this dictation was completed with [employer], the computer voice recognition software. Quite often unanticipated grammatical, syntax, homophones, and other interpretive errors are inadvertently transcribed by the computer software. Please disregard these errors. Please excuse any errors that have escaped final proofreading. History of Presenting Illness     Chief Complaint   Patient presents with    Altered mental status    High Blood Sugar       History Provided By: EMS    HPI: Alan Dominguez, 67 y.o. female, poorly controlled insulin-dependent diabetic well-known to this hospital in this emergency department here with altered mental status and increased respiratory rate. Blood sugar reading greater than 600, concern for DKA. She is unable to provide any history or review of systems at this time. PCP: Lakhwinder Peres MD    No current facility-administered medications on file prior to encounter. Current Outpatient Medications on File Prior to Encounter   Medication Sig Dispense Refill    amLODIPine (NORVASC) 10 mg tablet Take 1 Tab by mouth daily. 30 Tab 11    insulin degludec Tiana Ego FlexTouch U-100) 100 unit/mL (3 mL) inpn 14 Units by SubCUTAneous route daily. 2 Adjustable Dose Pre-filled Pen Syringe 11    insulin lispro (HUMALOG) 100 unit/mL kwikpen 3 units before breakfast and lunch,and 2 units before dinner 2 Adjustable Dose Pre-filled Pen Syringe 11    zinc oxide 20 % ointment Apply 1 Applicator to affected area two (2) times a day. thin layer gluteal cleft      losartan-hydroCHLOROthiazide (HYZAAR) 100-25 mg per tablet Take 1 Tab by mouth daily.       metoprolol succinate (TOPROL-XL) 25 mg XL tablet TAKE 1 TABLET BY MOUTH EVERY DAY 90 Tab 3    pravastatin (PRAVACHOL) 80 mg tablet TAKE 1 TABLET BY MOUTH at bedtime 90 Tab 3    levothyroxine (SYNTHROID) 175 mcg tablet TAKE 1 TABLET BY MOUTH EVERY DAY 90 Tab 3    clopidogreL (PLAVIX) 75 mg tab TAKE 1 TABLET BY MOUTH EVERY DAY 90 Tab 3    flash glucose sensor (FREESTYLE KEVON 14 DAY SENSOR) kit Continuous monitoring 2 Kit 11    brimonidine (ALPHAGAN) 0.15 % ophthalmic solution Administer 1 Drop to both eyes two (2) times a day. Past History     Past Medical History:  Past Medical History:   Diagnosis Date    Heart failure ([address]     unknown to family    Hypertension     Stroke [facility])        Past Surgical History:  Past Surgical History:   Procedure Laterality Date    HX GYN      HX HEENT      thyroidectomy    HX HYSTERECTOMY      REMOVAL GALLBLADDER      THYROIDECTOMY         Family History:  Family History   Problem Relation Age of Onset    Diabetes Father     No Known Problems Mother        Social History:  Social History     Tobacco Use    Smoking status: Never Smoker    Smokeless tobacco: Never Used   Substance Use Topics    Alcohol use: No     Comment: been years    Drug use: No       Allergies:  No Known Allergies      Review of Systems   Review of Systems   Unable to perform ROS: Mental status change       Physical Exam   Physical Exam  Vitals signs and nursing note reviewed. Constitutional:       Comments: Ill-appearing elderly female, kussmaul breathing, severe distress. Smells of ketones   HENT:      Head: Normocephalic and atraumatic. Eyes:      General:         Right eye: No discharge. Left eye: No discharge. Conjunctiva/sclera: Conjunctivae normal.      Pupils: Pupils are equal, round, and reactive to light. Neck:      Musculoskeletal: Normal range of motion and neck supple. Trachea: No tracheal deviation. Cardiovascular:      Rate and Rhythm: Regular rhythm. Tachycardia present. Heart sounds: Normal heart sounds. No murmur. Pulmonary:      Breath sounds: No wheezing or rales.       Comments: Increased respiratory rate, lungs clear  Abdominal:      General: Bowel sounds are normal.      Palpations: Abdomen is soft. Tenderness: There is no abdominal tenderness. There is no guarding or rebound. Musculoskeletal: Normal range of motion. General: No tenderness or deformity. Skin:     General: Skin is warm and dry. Findings: No erythema or rash. Neurological:      Comments: Patient is awake, but has incoherent speech. She is protecting her airway.   She is moving all extremities   Psychiatric:      Comments: Unable to assess         Diagnostic Study Results     Labs -     Recent Results (from the past 12 hour(s))   GLUCOSE, POC    Collection Time: 10/20/20  5:29 PM   Result Value Ref Range    Glucose (POC) >600 (HH) 65 - 100 mg/dL    Performed by [employer] (PCT)    GLUCOSE, POC    Collection Time: 10/20/20  5:31 PM   Result Value Ref Range    Glucose (POC) >600 (HH) 65 - 100 mg/dL    Performed by [employer] [facility] (PCT)    EKG, 12 LEAD, INITIAL    Collection Time: 10/20/20  5:34 PM   Result Value Ref Range    Ventricular Rate 104 BPM    Atrial Rate 104 BPM    P-R Interval 150 ms    QRS Duration 88 ms    Q-T Interval 358 ms    QTC Calculation (Bezet) 470 ms    Calculated P Axis 38 degrees    Calculated R Axis -43 degrees    Calculated T Axis -6 degrees    Diagnosis       Sinus tachycardia  Possible Left atrial enlargement  Left axis deviation  Possible Anterior infarct (cited on or before 20-OCT-2020)  When compared with ECG of 16-SEP-2020 04:24,  Questionable change in initial forces of Septal leads  Inverted T waves have replaced nonspecific T wave abnormality in Inferior   leads     CBC WITH AUTOMATED DIFF    Collection Time: 10/20/20  5:48 PM   Result Value Ref Range    WBC 30.7 (H) 3.6 - 11.0 K/uL    RBC 3.53 (L) 3.80 - 5.20 M/uL    HGB 11.0 (L) 11.5 - 16.0 g/dL    HCT 36.8 35.0 - 47.0 %    .2 (H) 80.0 - 99.0 FL    MCH 31.2 26.0 - 34.0 PG    MCHC 29.9 (L) 30.0 - 36.5 g/dL    RDW 16.3 (H) 11.5 - 14.5 %    PLATELET 276 677 - 745 K/uL    MPV 9.9 8.9 - 12.9 FL    NRBC 0.1 (H) 0  WBC    ABSOLUTE NRBC 0.02 (H) 0.00 - 0.01 K/uL    NEUTROPHILS 88 (H) 32 - 75 %    BAND NEUTROPHILS 1 %    LYMPHOCYTES 6 (L) 12 - 49 %    MONOCYTES 4 (L) 5 - 13 %    EOSINOPHILS 0 0 - 7 %    BASOPHILS 1 0 - 1 %    IMMATURE GRANULOCYTES 0 0.0 - 0.5 %    ABS. NEUTROPHILS 27.4 (H) 1.8 - 8.0 K/UL    ABS. LYMPHOCYTES 1.8 0.8 - 3.5 K/UL    ABS. MONOCYTES 1.2 (H) 0.0 - 1.0 K/UL    ABS. EOSINOPHILS 0.0 0.0 - 0.4 K/UL    ABS. BASOPHILS 0.3 (H) 0.0 - 0.1 K/UL    ABS. IMM. GRANS. 0.0 0.00 - 0.04 K/UL    DF AUTOMATED      RBC COMMENTS MACROCYTOSIS  PRESENT       METABOLIC PANEL, COMPREHENSIVE    Collection Time: 10/20/20  5:48 PM   Result Value Ref Range    Sodium 134 (L) 136 - 145 mmol/L    Potassium 6.0 (H) 3.5 - 5.1 mmol/L    Chloride 100 97 - 108 mmol/L    CO2 <5 (LL) 21 - 32 mmol/L    Anion gap Cannot be calculated 5 - 15 mmol/L    Glucose 838 (HH) 65 - 100 mg/dL    BUN 29 (H) 6 - 20 MG/DL    Creatinine 1.80 (H) 0.55 - 1.02 MG/DL    BUN/Creatinine ratio 16 12 - 20      GFR est AA 33 (L) >60 ml/min/1.73m2    GFR est non-AA 27 (L) >60 ml/min/1.73m2    Calcium 8.5 8.5 - 10.1 MG/DL    Bilirubin, total 0.5 0.2 - 1.0 MG/DL    ALT (SGPT) 111 (H) 12 - 78 U/L    AST (SGOT) 62 (H) 15 - 37 U/L    Alk.  phosphatase 205 (H) 45 - 117 U/L    Protein, total 6.4 6.4 - 8.2 g/dL    Albumin 2.9 (L) 3.5 - 5.0 g/dL    Globulin 3.5 2.0 - 4.0 g/dL    A-G Ratio 0.8 (L) 1.1 - 2.2     MAGNESIUM    Collection Time: 10/20/20  5:48 PM   Result Value Ref Range    Magnesium 2.2 1.6 - 2.4 mg/dL   PHOSPHORUS    Collection Time: 10/20/20  5:48 PM   Result Value Ref Range    Phosphorus 7.6 (H) 2.6 - 4.7 MG/DL   LIPASE    Collection Time: 10/20/20  5:48 PM   Result Value Ref Range    Lipase 36 (L) 73 - 393 U/L   LACTIC ACID    Collection Time: 10/20/20  5:50 PM   Result Value Ref Range    Lactic acid 5.2 (HH) 0.4 - 2.0 MMOL/L   URINALYSIS W/ REFLEX CULTURE    Collection Time: 10/20/20  5:50 PM    Specimen: Urine   Result Value Ref Range    Color YELLOW/STRAW      Appearance CLEAR CLEAR      Specific gravity 1.024 1.003 - 1.030      pH (UA) 5.0 5.0 - 8.0      Protein 30 (A) NEG mg/dL    Glucose >1,000 (A) NEG mg/dL    Ketone 80 (A) NEG mg/dL    Bilirubin Negative NEG      Blood TRACE (A) NEG      Urobilinogen 0.2 0.2 - 1.0 EU/dL    Nitrites Negative NEG      Leukocyte Esterase Negative NEG      WBC 0-4 0 - 4 /hpf    RBC 0-5 0 - 5 /hpf    Epithelial cells FEW FEW /lpf    Bacteria Negative NEG /hpf    UA:UC IF INDICATED CULTURE NOT INDICATED BY UA RESULT CNI      Yeast PRESENT (A) NEG     GLUCOSE, POC    Collection Time: 10/20/20  8:55 PM   Result Value Ref Range    Glucose (POC) >600 (HH) 65 - 100 mg/dL    Performed by Wiley Rodriguez RN    GLUCOSE, POC    Collection Time: 10/20/20 10:08 PM   Result Value Ref Range    Glucose (POC) 567 (H) 65 - 100 mg/dL    Performed by Chikis Villarreal BSN    SARS-COV-2    Collection Time: 10/20/20 10:33 PM   Result Value Ref Range    Specimen source Nasopharyngeal      Specimen source Nasopharyngeal      COVID-19 rapid test PENDING     Specimen type NP Swab      Health status Symptomatic Testing         Radiologic Studies -   CT HEAD WO CONT   Final Result   IMPRESSION: No acute intracranial hemorrhage, mass or infarct. Stable pattern of   atrophy and chronic white matter change most compatible with small vessel   ischemic and/or senescent change. CT ABD PELV W CONT   Final Result   IMPRESSION: No acute findings. XR CHEST PORT   Final Result   IMPRESSION: No acute findings. CT Results  (Last 48 hours)               10/20/20 2033  CT HEAD WO CONT Final result    Impression:  IMPRESSION: No acute intracranial hemorrhage, mass or infarct. Stable pattern of   atrophy and chronic white matter change most compatible with small vessel   ischemic and/or senescent change. Narrative:  EXAM: CT HEAD WO CONT       INDICATION: altered       COMPARISON: CT 8/25/2020.        CONTRAST: None.       TECHNIQUE: Unenhanced CT of the head was performed using 5 mm images. Brain and   bone windows were generated. Coronal and sagittal reformats. CT dose reduction   was achieved through use of a standardized protocol tailored for this   examination and automatic exposure control for dose modulation. FINDINGS: There is no acute intracranial hemorrhage, mass, mass effect or   herniation. Ventricles and sulci show no significant change in proportionate and   symmetric prominence. There is no significant change in pattern of   periventricular white matter hypodensity. The gray-white matter differentiation   is well-preserved. The mastoid air cells are well pneumatized. The visualized   paranasal sinuses are normal.           10/20/20 2033  CT ABD PELV W CONT Final result    Impression:  IMPRESSION: No acute findings. Narrative:  EXAM: CT ABD PELV W CONT       INDICATION: Abdominal pain. COMPARISON: CT 2/19/2020. CONTRAST: 100 mL of Isovue-370. TECHNIQUE:    Following the uneventful intravenous administration of contrast, thin axial   images were obtained through the abdomen and pelvis. Coronal and sagittal   reconstructions were generated. Oral contrast was not administered. CT dose   reduction was achieved through use of a standardized protocol tailored for this   examination and automatic exposure control for dose modulation. FINDINGS:    LOWER THORAX: Mole left pleural effusion. Aerated lungs are clear. Visualized   heart is normal in size without pericardial effusion. LIVER: No mass. BILIARY TREE: Gallbladder is within normal limits. CBD is not dilated. SPLEEN: within normal limits. PANCREAS: No mass or ductal dilatation. ADRENALS: Unremarkable. KIDNEYS: No mass, calculus, or hydronephrosis. STOMACH: Unremarkable. SMALL BOWEL: No dilatation or wall thickening. COLON: No dilatation or wall thickening. APPENDIX: Unremarkable.    PERITONEUM: No ascites or pneumoperitoneum. RETROPERITONEUM: Atherosclerotic calcination without aneurysm or dissection. No   enlarged lymphadenopathy. REPRODUCTIVE ORGANS: Uterus is surgically absent. Ovaries appear unremarkable. URINARY BLADDER: No mass or calculus. BONES: Degenerative spine change. No acute fracture or aggressive lesion. ABDOMINAL WALL: No mass or hernia. ADDITIONAL COMMENTS: Respiratory motion compromises evaluation of the lower   thorax and upper abdomen. CXR Results  (Last 48 hours)               10/20/20 1917  XR CHEST PORT Final result    Impression:  IMPRESSION: No acute findings. Narrative:  EXAM: XR CHEST PORT       INDICATION: meets SIRS criteria       COMPARISON: Chest x-ray 9/1/2020. FINDINGS: A portable AP radiograph of the chest was obtained at 19:03 hours. The   patient is on a cardiac monitor. The lungs are clear. The cardiac and   mediastinal contours and pulmonary vascularity are normal.  Atherosclerotic   calcifications affect the aortic arch. The chest wall structures and visualized   upper abdomen show no acute findings with incidental note of degenerative spine   and osteoarthritic shoulder changes with chronic healed appearing fracture   deformity of left humeral neck as well as diffuse osteopenia. Medical Decision Making   I am the first provider for this patient. I reviewed the vital signs, available nursing notes, past medical history, past surgical history, family history and social history. Vital Signs-Reviewed the patient's vital signs. Patient Vitals for the past 12 hrs:   Temp Pulse Resp BP SpO2   10/20/20 1825     94 %   10/20/20 1800  100 26 (!) 124/32    10/20/20 1729 98.8 °F (37.1 °C) (!) 24 24 (!) 127/39 99 %       EKG interpretation: (Preliminary)  EKG shows sinus tachycardia, rate 104. Left axis deviation. No evidence of ST elevation myocardial infarction.     Records Reviewed:   Nursing notes, Prior visits       Prior records reviewed. Patient has had multiple hospitalizations for hypoglycemia, more recently though has had more hospitalizations for hyperglycemia and DKA    Provider Notes (Medical Decision Making):   Patient presenting in DKA. Her venous pH is less than 7. Her potassium is elevated. Will give IV insulin bolus, started bicarb drip, IV fluids given for severe dehydration secondary to hyperglycemia. We will start an insulin drip. Will cover with antibiotics given the leukocytosis, but this could be reactionary. Suspicion for acute infectious process is low, but given the severity of the patient's illness it would be reasonable to cover with a dose of antibiotics until a possible cause or inciting factor could be sorted out. ED Course:   Initial assessment performed. The patients presenting problems have been discussed, and they are in agreement with the care plan formulated and outlined with them. I have encouraged them to ask questions as they arise throughout their visit. ED Course as of Oct 20 2248   Tue Oct 20, 2020   1813 Point-of-care chemistry shows anion gap acidosis. Bicarb is not measurable on the point-of-care test.  Potassium was 7.2, glucose greater than 650 creatinine was 1.2. We will go ahead and give 10 of insulin IV along with a gram of calcium to stabilize the cardiac membrane. Will give albuterol continuous to help try potassium in the cell. Patient will need an insulin drip. She may even need a bicarb drip depending on what her blood gas shows. If her pH is less than 7 we will consider bicarb drip    [AR]   1840 Discussed with Dr. Thomas Dickey, He will admit. [AR]      ED Course User Index  [AR] Nick Mitchell DO               Critical Care Time:   I have spent 35 minutes of critical care time in evaluating and treating this patient.   This includes time spent at bedside, time with family and decision makers, documentation, review of labs and imaging, and/or consultation with specialists. It does not include time spent on separately billed procedures. This patient presents with a critical illness or injury that acutely impairs one or more vital organ systems such that there is a high probability of imminent or life threatening deterioration in the patient's condition. This case involved decision making of high complexity to assess, manipulate, and support vital organ system failure and/or to prevent further life threatening deterioration of the patient's condition. Failure to initiate these interventions on an urgent basis would likely result in sudden, clinically significant or life threatening deterioration in the patient's condition. Abnormal findings supporting critical care: Diabetic ketoacidosis, lactic acidosis, metabolic acidosis  Interventions to support critical care: IV fluids, IV insulin, IV bicarb, cardiac monitoring, lab interpretation,  Failure to intervene may result in: Cerebral edema, arrhythmia, electrolyte disturbance, respiratory failure, death    Disposition:  Patient is being admitted to the hospital by Dr. Lin Deleon. The results of their tests and reasons for their admission have been discussed with them and/or available family. They convey agreement and understanding for the need to be admitted and for their admission diagnosis. Consultation has been made with the inpatient physician specialist for hospitalization. Adolfo Farfan PLAN:  1. Current Discharge Medication List        2. Follow-up Information    None         Return to ED if worse     Diagnosis     Clinical Impression:   1. Type 1 diabetes mellitus with ketoacidotic coma ([address]    2. Metabolic acidosis    3. Acute encephalopathy        Attestations:   This note was completed by Shauna Alves DO

## 2020-10-21 LAB
ANION GAP SERPL CALC-SCNC: 17 MMOL/L (ref 5–15)
ANION GAP SERPL CALC-SCNC: 22 MMOL/L (ref 5–15)
ANION GAP SERPL CALC-SCNC: 8 MMOL/L (ref 5–15)
ATRIAL RATE: 104 BPM
BUN BLD-MCNC: 42 MG/DL (ref 9–20)
BUN SERPL-MCNC: 20 MG/DL (ref 6–20)
BUN SERPL-MCNC: 22 MG/DL (ref 6–20)
BUN SERPL-MCNC: 24 MG/DL (ref 6–20)
BUN/CREAT SERPL: 13 (ref 12–20)
BUN/CREAT SERPL: 14 (ref 12–20)
BUN/CREAT SERPL: 14 (ref 12–20)
CA-I BLD-MCNC: 1.03 MMOL/L (ref 1.12–1.32)
CA-I BLD-SCNC: 1.11 MMOL/L (ref 1.12–1.32)
CALCIUM SERPL-MCNC: 7.7 MG/DL (ref 8.5–10.1)
CALCIUM SERPL-MCNC: 7.9 MG/DL (ref 8.5–10.1)
CALCIUM SERPL-MCNC: 8 MG/DL (ref 8.5–10.1)
CALCULATED P AXIS, ECG09: 38 DEGREES
CALCULATED R AXIS, ECG10: -43 DEGREES
CALCULATED T AXIS, ECG11: -6 DEGREES
CHLORIDE BLD-SCNC: 108 MMOL/L (ref 98–107)
CHLORIDE SERPL-SCNC: 109 MMOL/L (ref 97–108)
CHLORIDE SERPL-SCNC: 114 MMOL/L (ref 97–108)
CHLORIDE SERPL-SCNC: 117 MMOL/L (ref 97–108)
CO2 SERPL-SCNC: 14 MMOL/L (ref 21–32)
CO2 SERPL-SCNC: 19 MMOL/L (ref 21–32)
CO2 SERPL-SCNC: 26 MMOL/L (ref 21–32)
CREAT BLD-MCNC: 1.2 MG/DL (ref 0.6–1.3)
CREAT SERPL-MCNC: 1.38 MG/DL (ref 0.55–1.02)
CREAT SERPL-MCNC: 1.64 MG/DL (ref 0.55–1.02)
CREAT SERPL-MCNC: 1.75 MG/DL (ref 0.55–1.02)
DIAGNOSIS, 93000: NORMAL
ERYTHROCYTE [DISTWIDTH] IN BLOOD BY AUTOMATED COUNT: 15.2 % (ref 11.5–14.5)
EST. AVERAGE GLUCOSE BLD GHB EST-MCNC: 332 MG/DL
GLUCOSE BLD STRIP.AUTO-MCNC: 124 MG/DL (ref 65–100)
GLUCOSE BLD STRIP.AUTO-MCNC: 128 MG/DL (ref 65–100)
GLUCOSE BLD STRIP.AUTO-MCNC: 167 MG/DL (ref 65–100)
GLUCOSE BLD STRIP.AUTO-MCNC: 169 MG/DL (ref 65–100)
GLUCOSE BLD STRIP.AUTO-MCNC: 172 MG/DL (ref 65–100)
GLUCOSE BLD STRIP.AUTO-MCNC: 177 MG/DL (ref 65–100)
GLUCOSE BLD STRIP.AUTO-MCNC: 183 MG/DL (ref 65–100)
GLUCOSE BLD STRIP.AUTO-MCNC: 211 MG/DL (ref 65–100)
GLUCOSE BLD STRIP.AUTO-MCNC: 331 MG/DL (ref 65–100)
GLUCOSE BLD STRIP.AUTO-MCNC: 442 MG/DL (ref 65–100)
GLUCOSE BLD STRIP.AUTO-MCNC: 459 MG/DL (ref 65–100)
GLUCOSE BLD STRIP.AUTO-MCNC: 48 MG/DL (ref 65–100)
GLUCOSE BLD STRIP.AUTO-MCNC: 52 MG/DL (ref 65–100)
GLUCOSE BLD STRIP.AUTO-MCNC: 87 MG/DL (ref 65–100)
GLUCOSE BLD STRIP.AUTO-MCNC: 91 MG/DL (ref 65–100)
GLUCOSE BLD STRIP.AUTO-MCNC: 98 MG/DL (ref 65–100)
GLUCOSE BLD STRIP.AUTO-MCNC: 99 MG/DL (ref 65–100)
GLUCOSE BLD-MCNC: >650 MG/DL (ref 65–100)
GLUCOSE SERPL-MCNC: 194 MG/DL (ref 65–100)
GLUCOSE SERPL-MCNC: 411 MG/DL (ref 65–100)
GLUCOSE SERPL-MCNC: 80 MG/DL (ref 65–100)
HBA1C MFR BLD: 13.2 % (ref 4–5.6)
HCT VFR BLD AUTO: 34 % (ref 35–47)
HCT VFR BLD CALC: 40 % (ref 35–47)
HGB BLD-MCNC: 11.3 G/DL (ref 11.5–16)
LACTATE SERPL-SCNC: 5.6 MMOL/L (ref 0.4–2)
LACTATE SERPL-SCNC: 5.7 MMOL/L (ref 0.4–2)
MCH RBC QN AUTO: 31 PG (ref 26–34)
MCHC RBC AUTO-ENTMCNC: 33.2 G/DL (ref 30–36.5)
MCV RBC AUTO: 93.4 FL (ref 80–99)
NRBC # BLD: 0 K/UL (ref 0–0.01)
NRBC BLD-RTO: 0 PER 100 WBC
O2/TOTAL GAS SETTING VFR VENT: 21 %
P-R INTERVAL, ECG05: 150 MS
PCO2 BLD: <15 MMHG (ref 35–45)
PH BLD: 6.97 [PH] (ref 7.35–7.45)
PLATELET # BLD AUTO: 310 K/UL (ref 150–400)
PMV BLD AUTO: 9.5 FL (ref 8.9–12.9)
PO2 BLD: 141 MMHG (ref 80–100)
POTASSIUM BLD-SCNC: 7.2 MMOL/L (ref 3.5–5.1)
POTASSIUM SERPL-SCNC: 2.7 MMOL/L (ref 3.5–5.1)
POTASSIUM SERPL-SCNC: 3.1 MMOL/L (ref 3.5–5.1)
POTASSIUM SERPL-SCNC: 3.6 MMOL/L (ref 3.5–5.1)
Q-T INTERVAL, ECG07: 358 MS
QRS DURATION, ECG06: 88 MS
QTC CALCULATION (BEZET), ECG08: 470 MS
RBC # BLD AUTO: 3.64 M/UL (ref 3.8–5.2)
SERVICE CMNT-IMP: ABNORMAL
SERVICE CMNT-IMP: NORMAL
SODIUM BLD-SCNC: 131 MMOL/L (ref 136–145)
SODIUM SERPL-SCNC: 145 MMOL/L (ref 136–145)
SODIUM SERPL-SCNC: 150 MMOL/L (ref 136–145)
SODIUM SERPL-SCNC: 151 MMOL/L (ref 136–145)
SPECIMEN TYPE: ABNORMAL
VENTRICULAR RATE, ECG03: 104 BPM
WBC # BLD AUTO: 27.6 K/UL (ref 3.6–11)

## 2020-10-21 PROCEDURE — 83036 HEMOGLOBIN GLYCOSYLATED A1C: CPT

## 2020-10-21 PROCEDURE — 74011636637 HC RX REV CODE- 636/637: Performed by: INTERNAL MEDICINE

## 2020-10-21 PROCEDURE — 74011250637 HC RX REV CODE- 250/637: Performed by: INTERNAL MEDICINE

## 2020-10-21 PROCEDURE — 85027 COMPLETE CBC AUTOMATED: CPT

## 2020-10-21 PROCEDURE — 83605 ASSAY OF LACTIC ACID: CPT

## 2020-10-21 PROCEDURE — 74011000258 HC RX REV CODE- 258: Performed by: INTERNAL MEDICINE

## 2020-10-21 PROCEDURE — 3331090001 HH PPS REVENUE CREDIT

## 2020-10-21 PROCEDURE — 74011250636 HC RX REV CODE- 250/636: Performed by: INTERNAL MEDICINE

## 2020-10-21 PROCEDURE — 80048 BASIC METABOLIC PNL TOTAL CA: CPT

## 2020-10-21 PROCEDURE — 74011000250 HC RX REV CODE- 250: Performed by: EMERGENCY MEDICINE

## 2020-10-21 PROCEDURE — 65660000000 HC RM CCU STEPDOWN

## 2020-10-21 PROCEDURE — 82962 GLUCOSE BLOOD TEST: CPT

## 2020-10-21 PROCEDURE — 36415 COLL VENOUS BLD VENIPUNCTURE: CPT

## 2020-10-21 PROCEDURE — 3331090002 HH PPS REVENUE DEBIT

## 2020-10-21 RX ORDER — POTASSIUM CHLORIDE 7.45 MG/ML
10 INJECTION INTRAVENOUS
Status: COMPLETED | OUTPATIENT
Start: 2020-10-21 | End: 2020-10-21

## 2020-10-21 RX ORDER — DEXTROSE, SODIUM CHLORIDE, AND POTASSIUM CHLORIDE 5; .45; .3 G/100ML; G/100ML; G/100ML
INJECTION INTRAVENOUS CONTINUOUS
Status: DISCONTINUED | OUTPATIENT
Start: 2020-10-21 | End: 2020-10-21

## 2020-10-21 RX ORDER — INSULIN LISPRO 100 [IU]/ML
3 INJECTION, SOLUTION INTRAVENOUS; SUBCUTANEOUS
Status: DISCONTINUED | OUTPATIENT
Start: 2020-10-21 | End: 2020-10-21

## 2020-10-21 RX ORDER — INSULIN LISPRO 100 [IU]/ML
INJECTION, SOLUTION INTRAVENOUS; SUBCUTANEOUS
Status: DISCONTINUED | OUTPATIENT
Start: 2020-10-21 | End: 2020-10-21

## 2020-10-21 RX ORDER — CHLORHEXIDINE GLUCONATE 0.12 MG/ML
15 RINSE ORAL EVERY 12 HOURS
Status: DISCONTINUED | OUTPATIENT
Start: 2020-10-21 | End: 2020-10-21

## 2020-10-21 RX ORDER — MAGNESIUM SULFATE 100 %
4 CRYSTALS MISCELLANEOUS AS NEEDED
Status: DISCONTINUED | OUTPATIENT
Start: 2020-10-21 | End: 2020-10-25 | Stop reason: HOSPADM

## 2020-10-21 RX ORDER — INSULIN LISPRO 100 [IU]/ML
INJECTION, SOLUTION INTRAVENOUS; SUBCUTANEOUS
Status: DISCONTINUED | OUTPATIENT
Start: 2020-10-22 | End: 2020-10-22

## 2020-10-21 RX ORDER — INSULIN GLARGINE 100 [IU]/ML
18 INJECTION, SOLUTION SUBCUTANEOUS
Status: COMPLETED | OUTPATIENT
Start: 2020-10-21 | End: 2020-10-21

## 2020-10-21 RX ORDER — INSULIN GLARGINE 100 [IU]/ML
10 INJECTION, SOLUTION SUBCUTANEOUS
Status: DISCONTINUED | OUTPATIENT
Start: 2020-10-21 | End: 2020-10-21

## 2020-10-21 RX ORDER — INSULIN GLARGINE 100 [IU]/ML
18 INJECTION, SOLUTION SUBCUTANEOUS
Status: DISCONTINUED | OUTPATIENT
Start: 2020-10-22 | End: 2020-10-22

## 2020-10-21 RX ORDER — DEXTROSE 50 % IN WATER (D50W) INTRAVENOUS SYRINGE
12.5-25 AS NEEDED
Status: DISCONTINUED | OUTPATIENT
Start: 2020-10-21 | End: 2020-10-25 | Stop reason: HOSPADM

## 2020-10-21 RX ORDER — SODIUM CHLORIDE AND POTASSIUM CHLORIDE .9; .15 G/100ML; G/100ML
SOLUTION INTRAVENOUS CONTINUOUS
Status: DISCONTINUED | OUTPATIENT
Start: 2020-10-21 | End: 2020-10-21

## 2020-10-21 RX ORDER — SODIUM CHLORIDE 450 MG/100ML
125 INJECTION, SOLUTION INTRAVENOUS CONTINUOUS
Status: DISCONTINUED | OUTPATIENT
Start: 2020-10-21 | End: 2020-10-21

## 2020-10-21 RX ORDER — NYSTATIN 100000 [USP'U]/G
POWDER TOPICAL 2 TIMES DAILY
Status: DISCONTINUED | OUTPATIENT
Start: 2020-10-21 | End: 2020-10-25 | Stop reason: HOSPADM

## 2020-10-21 RX ADMIN — LEVOTHYROXINE SODIUM 175 MCG: 0.15 TABLET ORAL at 09:37

## 2020-10-21 RX ADMIN — POTASSIUM CHLORIDE, DEXTROSE MONOHYDRATE AND SODIUM CHLORIDE: 300; 5; 450 INJECTION, SOLUTION INTRAVENOUS at 06:47

## 2020-10-21 RX ADMIN — HEPARIN SODIUM 5000 UNITS: 5000 INJECTION INTRAVENOUS; SUBCUTANEOUS at 22:17

## 2020-10-21 RX ADMIN — Medication 1 AMPULE: at 08:31

## 2020-10-21 RX ADMIN — PIPERACILLIN AND TAZOBACTAM 3.38 G: 3; .375 INJECTION, POWDER, LYOPHILIZED, FOR SOLUTION INTRAVENOUS at 17:53

## 2020-10-21 RX ADMIN — POTASSIUM CHLORIDE 10 MEQ: 7.46 INJECTION, SOLUTION INTRAVENOUS at 10:26

## 2020-10-21 RX ADMIN — FLUCONAZOLE 200 MG: 200 INJECTION, SOLUTION INTRAVENOUS at 09:36

## 2020-10-21 RX ADMIN — PIPERACILLIN AND TAZOBACTAM 3.38 G: 3; .375 INJECTION, POWDER, LYOPHILIZED, FOR SOLUTION INTRAVENOUS at 03:49

## 2020-10-21 RX ADMIN — POTASSIUM CHLORIDE 10 MEQ: 7.46 INJECTION, SOLUTION INTRAVENOUS at 12:42

## 2020-10-21 RX ADMIN — PIPERACILLIN AND TAZOBACTAM 3.38 G: 3; .375 INJECTION, POWDER, LYOPHILIZED, FOR SOLUTION INTRAVENOUS at 09:25

## 2020-10-21 RX ADMIN — AMLODIPINE BESYLATE 10 MG: 5 TABLET ORAL at 08:31

## 2020-10-21 RX ADMIN — SODIUM CHLORIDE 125 ML/HR: 450 INJECTION, SOLUTION INTRAVENOUS at 17:59

## 2020-10-21 RX ADMIN — Medication 1 AMPULE: at 22:16

## 2020-10-21 RX ADMIN — BRIMONIDINE TARTRATE 1 DROP: 2 SOLUTION/ DROPS OPHTHALMIC at 08:09

## 2020-10-21 RX ADMIN — CLOPIDOGREL BISULFATE 75 MG: 75 TABLET ORAL at 08:30

## 2020-10-21 RX ADMIN — SODIUM CHLORIDE 0 UNITS/HR: 9 INJECTION, SOLUTION INTRAVENOUS at 12:45

## 2020-10-21 RX ADMIN — DEXTROSE MONOHYDRATE 25 G: 25 INJECTION, SOLUTION INTRAVENOUS at 12:45

## 2020-10-21 RX ADMIN — METOPROLOL SUCCINATE 25 MG: 50 TABLET, EXTENDED RELEASE ORAL at 08:30

## 2020-10-21 RX ADMIN — Medication 10 ML: at 13:20

## 2020-10-21 RX ADMIN — POTASSIUM CHLORIDE AND SODIUM CHLORIDE: 900; 150 INJECTION, SOLUTION INTRAVENOUS at 04:36

## 2020-10-21 RX ADMIN — SODIUM CHLORIDE 0 UNITS/HR: 9 INJECTION, SOLUTION INTRAVENOUS at 11:30

## 2020-10-21 RX ADMIN — Medication 10 ML: at 22:17

## 2020-10-21 RX ADMIN — BRIMONIDINE TARTRATE 1 DROP: 2 SOLUTION/ DROPS OPHTHALMIC at 22:16

## 2020-10-21 RX ADMIN — POTASSIUM CHLORIDE 10 MEQ: 7.46 INJECTION, SOLUTION INTRAVENOUS at 11:35

## 2020-10-21 RX ADMIN — INSULIN GLARGINE 18 UNITS: 100 INJECTION, SOLUTION SUBCUTANEOUS at 15:20

## 2020-10-21 RX ADMIN — POTASSIUM CHLORIDE: 2 INJECTION, SOLUTION, CONCENTRATE INTRAVENOUS at 22:39

## 2020-10-21 RX ADMIN — INSULIN LISPRO 3 UNITS: 100 INJECTION, SOLUTION INTRAVENOUS; SUBCUTANEOUS at 17:00

## 2020-10-21 RX ADMIN — SODIUM CHLORIDE 1.2 UNITS/HR: 9 INJECTION, SOLUTION INTRAVENOUS at 11:53

## 2020-10-21 RX ADMIN — DEXTROSE MONOHYDRATE 10.5 G: 25 INJECTION, SOLUTION INTRAVENOUS at 11:32

## 2020-10-21 RX ADMIN — HEPARIN SODIUM 5000 UNITS: 5000 INJECTION INTRAVENOUS; SUBCUTANEOUS at 06:27

## 2020-10-21 RX ADMIN — POTASSIUM CHLORIDE 10 MEQ: 7.46 INJECTION, SOLUTION INTRAVENOUS at 13:45

## 2020-10-21 RX ADMIN — HEPARIN SODIUM 5000 UNITS: 5000 INJECTION INTRAVENOUS; SUBCUTANEOUS at 13:26

## 2020-10-21 RX ADMIN — SODIUM CHLORIDE 23.9 UNITS/HR: 9 INJECTION, SOLUTION INTRAVENOUS at 01:17

## 2020-10-21 NOTE — PROGRESS NOTES
General Daily Progress Note    Admit Date: 10/20/2020    Subjective:     Patient has no complaint. .     Current Facility-Administered Medications   Medication Dose Route Frequency    dextrose 5% - 0.45% NaCl with KCl 40 mEq/L infusion   IntraVENous CONTINUOUS    alcohol 62% (NOZIN) nasal  1 Ampule  1 Ampule Topical Q12H    sodium chloride (NS) flush 5-10 mL  5-10 mL IntraVENous PRN    albuterol CONCENTRATE 2.5mg/0.5 mL neb soln  10 mg/hr Nebulization CONTINUOUS    insulin regular (NOVOLIN R, HUMULIN R) 100 Units in 0.9% sodium chloride 100 mL infusion  0-50 Units/hr IntraVENous TITRATE    insulin lispro (HUMALOG) injection   SubCUTAneous TIDAC    glucose chewable tablet 16 g  4 Tab Oral PRN    dextrose (D50W) injection syrg 12.5-25 g  25-50 mL IntraVENous PRN    glucagon (GLUCAGEN) injection 1 mg  1 mg IntraMUSCular PRN    amLODIPine (NORVASC) tablet 10 mg  10 mg Oral DAILY    brimonidine (ALPHAGAN) 0.2 % ophthalmic solution 1 Drop  1 Drop Both Eyes BID    clopidogreL (PLAVIX) tablet 75 mg  75 mg Oral DAILY    levothyroxine (SYNTHROID) tablet 175 mcg  175 mcg Oral ACB    metoprolol succinate (TOPROL-XL) XL tablet 25 mg  25 mg Oral DAILY    sodium chloride (NS) flush 5-40 mL  5-40 mL IntraVENous Q8H    sodium chloride (NS) flush 5-40 mL  5-40 mL IntraVENous PRN    ondansetron (ZOFRAN ODT) tablet 4 mg  4 mg Oral Q8H PRN    Or    ondansetron (ZOFRAN) injection 4 mg  4 mg IntraVENous Q6H PRN    heparin (porcine) injection 5,000 Units  5,000 Units SubCUTAneous Q8H    fluconazole (DIFLUCAN) 200mg/100 mL IVPB (premix)  200 mg IntraVENous DAILY    piperacillin-tazobactam (ZOSYN) 3.375 g in 0.9% sodium chloride (MBP/ADV) 100 mL  3.375 g IntraVENous Q8H     Current Outpatient Medications   Medication Sig    amLODIPine (NORVASC) 10 mg tablet Take 1 Tab by mouth daily.  insulin degludec Raina Reji FlexTouch U-100) 100 unit/mL (3 mL) inpn 14 Units by SubCUTAneous route daily.     insulin lispro (HUMALOG) 100 unit/mL kwikpen 3 units before breakfast and lunch,and 2 units before dinner    zinc oxide 20 % ointment Apply 1 Applicator to affected area two (2) times a day. thin layer gluteal cleft    losartan-hydroCHLOROthiazide (HYZAAR) 100-25 mg per tablet Take 1 Tab by mouth daily.  metoprolol succinate (TOPROL-XL) 25 mg XL tablet TAKE 1 TABLET BY MOUTH EVERY DAY    pravastatin (PRAVACHOL) 80 mg tablet TAKE 1 TABLET BY MOUTH at bedtime    levothyroxine (SYNTHROID) 175 mcg tablet TAKE 1 TABLET BY MOUTH EVERY DAY    clopidogreL (PLAVIX) 75 mg tab TAKE 1 TABLET BY MOUTH EVERY DAY    flash glucose sensor ([employer] PharmaSTYLE KEVON 14 DAY SENSOR) kit Continuous monitoring    brimonidine (ALPHAGAN) 0.15 % ophthalmic solution Administer 1 Drop to both eyes two (2) times a day. Review of Systems  A comprehensive review of systems was negative.     Objective:     Patient Vitals for the past 24 hrs:   BP Temp Pulse Resp SpO2 Weight   10/21/20 0900 (!) 117/49  76 15 99 %    10/21/20 0800 (!) 132/54 98.6 °F (37 °C) 77 17 99 %    10/21/20 0700 119/66   13 100 %    10/21/20 0545 (!) 124/48  81 19 100 %    10/21/20 0530 (!) 134/44  80 23 100 %    10/21/20 0515 (!) 121/41  80 19 100 %    10/21/20 0500 (!) 124/40  80 19 100 %    10/21/20 0430 (!) 120/41  79 18 100 %    10/21/20 0400 (!) 120/40  82 20 100 %    10/21/20 0330 (!) 114/40  80 20 100 %    10/21/20 0300 (!) 114/38  82 20 100 %    10/21/20 0115 (!) 114/44  80 20 100 %    10/21/20 0045 111/80  83 24 100 %    10/21/20 0015 (!) 117/45  83 19 100 %    10/20/20 2345 (!) 116/45  85 21 100 %    10/20/20 2245 (!) 114/44  84 22 100 %    10/20/20 2145 (!) 111/46  87 25 100 %    10/20/20 2100 (!) 115/44  88 23 100 %    10/20/20 2000 (!) 119/39  95 22 99 %    10/20/20 1915 (!) 116/41  98 23 100 %    10/20/20 1830 (!) 116/38  95 26 90 %    10/20/20 1825     94 %    10/20/20 1800 (!) 124/32  100 26     10/20/20 1730 (!) 127/39  (!) 102 27 99 %    10/20/20 1729 (!) 127/39 98.8 °F (37.1 °C) (!) 24 24 99 % 147 lb 7.8 oz (66.9 kg)     10/21 0701 - 10/21 1900  In: 692.5 [I.V.:692.5]  Out: 500 [Urine:500]  No intake/output data recorded. Physical Exam:   Visit Vitals  BP (!) 117/49   Pulse 76   Temp 98.6 °F (37 °C)   Resp 15   Wt 147 lb 7.8 oz (66.9 kg)   SpO2 99%   BMI 25.32 kg/m²     General appearance: alert, cooperative, no distress, appears stated age  Neck: supple, symmetrical, trachea midline, no adenopathy, thyroid: not enlarged, symmetric, no tenderness/mass/nodules, no carotid bruit and no JVD  Lungs: clear to auscultation bilaterally  Heart: regular rate and rhythm, S1, S2 normal, no murmur, click, rub or gallop  Abdomen: soft, non-tender. Bowel sounds normal. No masses,  no organomegaly  Extremities: extremities normal, atraumatic, no cyanosis or edema    Assessment:     Active Problems:    Vascular dementia without behavioral disturbance ([facility] (10/20/2018)      ONEYDA (acute kidney injury) ([facility] (9/16/2020)      Hyperkalemia (10/20/2020)      DKA, type 1 ([address] (10/20/2020)      Severe sepsis ([address] (10/20/2020)      Candidiasis (10/20/2020)        Plan:     1. Patient now admitted for diabetic ketoacidosis which is secondary to very poor living situation and her dementia. There does not appear to be a precipitating factor as is oftentimes the case. Continue insulin drip with Glucomander control to CO2 is greater than 20. We will then transition off of drip. 2.  Continue hydration with correction of electrolytes.

## 2020-10-21 NOTE — PROGRESS NOTES
Pharmacy Clarification of the Prior to Admission Medication Regimen Retrospective to the Admission Medication Reconciliation    The patient was  interviewed regarding clarification of the prior to admission medication regimen. Patient was questioned regarding use of any other inhalers, topical products, over the counter medications, herbal medications, vitamin products or ophthalmic/nasal/otic medication use. Information Obtained From: Patient    Recommendations/Findings: The following amendments were made to the patient's active medication list on file at [facility]:     1) Additions: 0      2) Removals: 0    3) Changes: 0      4) Pertinent Pharmacy Findings:  [facility]     PTA medication list was corrected to the following:     Prior to Admission Medications   Prescriptions Last Dose Informant Taking? amLODIPine (NORVASC) 10 mg tablet 10/20/2020 at Unknown time Self Yes   Sig: Take 1 Tab by mouth daily. brimonidine (ALPHAGAN) 0.15 % ophthalmic solution 10/20/2020 at Unknown time Self Yes   Sig: Administer 1 Drop to both eyes two (2) times a day. clopidogreL (PLAVIX) 75 mg tab 10/20/2020 at Unknown time Self Yes   Sig: TAKE 1 TABLET BY MOUTH EVERY DAY   insulin degludec Allegra Chiquito FlexTouch U-100) 100 unit/mL (3 mL) inpn 10/20/2020 at Unknown time Self Yes   Si Units by SubCUTAneous route daily. insulin lispro (HUMALOG) 100 unit/mL kwikpen 10/20/2020 at Unknown time Self Yes   Sig: 3 units before breakfast and lunch,and 2 units before dinner   levothyroxine (SYNTHROID) 175 mcg tablet 10/20/2020 at Unknown time Self Yes   Sig: TAKE 1 TABLET BY MOUTH EVERY DAY   losartan-hydroCHLOROthiazide (HYZAAR) 100-25 mg per tablet 10/20/2020 at Unknown time Self Yes   Sig: Take 1 Tab by mouth daily.    metoprolol succinate (TOPROL-XL) 25 mg XL tablet 10/20/2020 at Unknown time Self Yes   Sig: TAKE 1 TABLET BY MOUTH EVERY DAY   pravastatin (PRAVACHOL) 80 mg tablet 10/20/2020 at Unknown time Self Yes   Sig: TAKE 1 TABLET BY MOUTH at bedtime   zinc oxide 20 % ointment Unknown at Unknown time Self No   Sig: Apply 1 Applicator to affected area two (2) times a day.  thin layer gluteal cleft      Facility-Administered Medications: None          Thank you,  Daphne Enamorado  Medication History Pharmacy Technician

## 2020-10-21 NOTE — PROGRESS NOTES
Bedside and Verbal shift change report given to [address] (oncoming nurse) by ED nurse (offgoing nurse). Report included the following information SBAR, Kardex, ED Summary, Intake/Output, MAR, Recent Results, Med Rec Status and Cardiac Rhythm NSR.     1500:Patient settled in room, insulin gtt on hold per ED nurse, per Glucometer protocol. Patient alert and able to answer orientation questions including; Name and  -[facility] 1940, where she currently is -hospital, and the year -. Primary Nurse Shelli Nieto RN and Georgette Santana RN/CCL performed a dual skin assessment on this patient: No impairment noted  Madhu score is 16    1730: Insulin gtt stopped/discontinued.  Carb coverage given for dinner meal.

## 2020-10-21 NOTE — ED NOTES
Pt sleeping but easily aroused and states she is feeling a lot better. Pt asked what is going on and educated that her blood sugar was over 800.

## 2020-10-21 NOTE — ED NOTES
Pt awake and asked who the president was and states Risa Deleon. Pt asked the year and states . Pt asked where she is and states the hospital. Pt also able to state her name and .

## 2020-10-21 NOTE — PROGRESS NOTES
ALEKSANDAR Plan:    - Home with sons - pt has 4 children, lives with 2 of her sons; family assists with pt's needs, is working to arrange caregiver support in home for pt   - [facility] SPENSER for SN, PT, [facility] aide, SW - [facility] does not need new referral placed, will need SPENSER order from physician prior to d/c  - Daughter to transport at d/c  - OP F/u appts: PCP  - 2nd IMM letter        Reason for Admission:   AMS with high blood sugar at home                RUR Score:   58% high risk for readmission       PCP: First and Last name: Ambrose Argueta. Cris Lazo MD    Name of Practice: [address] [place] [address] and Primary care   Are you a current patient: Yes/No: Yes   Approximate date of last visit: Approx one month ago per family. Can you do a virtual visit with your PCP:  Yes with family assistance              Resources/supports as identified by patient/family:  Pt has 4 children, lives with two sons Luz Maria Lee and Malcolm Lopez. Family works together to address pt's needs in home. Pt is insured with Medicare and Medicaid and is working to arrange caregivers through [place]. Top Challenges facing patient (as identified by patient/family and CM): Finances/Medication cost?   No concerns voiced. Pt is insured with Medicare/medicaid               Transportation? Pt's sons or daughter transport pt              Support system or lack thereof? Pt's family provides assistance to pt in home, feels that they could use the additional support of caregivers in home to help. Living arrangements? Lives with 2 sons, Malcolm Lopez and Luz Maria Lee                Self-care/ADLs/Cognition? Per pt's daughter, pt requires assistance with bathing, cooking, medication management. Current Advanced Directive/Advance Care Plan:  No ACP documents on file. Pt is a FULL code.                           Plan for utilizing home health:  SPENSER orders to be sent to [facility] for SN, PT,  Aide, SW                  Transition of Care Plan: Home with support of sons/daughter, [place], caregiver assistance ? CM talked with pt's son, Shay Sousa, and daughter, Jean-Pierre López, via phone to introduce self/role, verify demographics, and discuss discharge planning. Pt is a 67 y.o. who presented to [address] [facility] ED with AMS with high blood sugar at home. Pt's PMHx is significant for heart failure, hypertension, stroke, etc.    Pt's ALEKSANDAR plan is to return to previous home living with two sons, Asim Be and Deepika Griffin. Pt also has two other children, Shay Sousa and Jean-Pierre López, whom both verified they are to be the main point of contact for pt. Pt's daughter stated that they assist with pt's needs in the home and work to do the best they can. They feel that they would benefit from caregiver support through [facility], and have list of caregiver agencies. [facility] has been completed by [facility] on 10/7/2020. CM encouraged pt's brother to reach out to agencies of interest as soon as possible to arrange services for pt at d/c. Pt's daughter reports that pt requires assistance with medication management, cooking, and bathing. Pt has no DME at home. Home is multi-level, with pt's bedroom being on the second floor with approx 11 steps upstairs. Per Shay Sousa and Jean-Pierre López, pt's family has discussed moving pt's bedroom downstairs so that pt will not have to ambulate up and down stairs. Pt's daughter to transport at d/c and provide transportation to f/u app. Home health previously through [facility], [address] orders to be sent once completed by physician. [facility] [address] CM to continue to follow for additional ALEKSANDAR planning needs.                 Readmission Assessment  Number of days since last admission?: 8-30 days  Previous disposition: Home with Family  Who is being interviewed?: Caregiver(Daughter, Jean-Pierre López, and Son, Shay Sousa)  What was the patient's/caregiver's perception as to why they think they needed to return back to the hospital?: Other (Comment)(Medical necessity: AMS w/ high blood sugar)  Did you visit your Primary Care Physician after you left the hospital, before you returned this time?: No  Did you see a specialist, such as Cardiac, Pulmonary, Orthopedic Physician, etc. after you left the hospital?: No  Who advised the patient to return to the hospital?: Self-referral  Does the patient report anything that got in the way of taking their medications?: No  In our efforts to provide the best possible care to you and others like you, can you think of anything that we could have done to help you after you left the hospital the first time, so that you might not have needed to return so soon?: Arrange for more help when leaving the hospital, Other (Comment)(Pt returned d/t medical necessity: AMS with high blood sugar)        STEFANIE Severino.  Care Manager

## 2020-10-21 NOTE — ED NOTES
Pt resting on stretcher and appears more alert. Pt knows her name and that she is in a hospital but confused on time.

## 2020-10-21 NOTE — ED NOTES
Bedside and Verbal shift change report given to Nick Hays (oncoming nurse) by Maura Pollard RN (offgoing nurse). Report included the following information SBAR, Kardex, ED Summary, MAR, Recent Results and Cardiac Rhythm NSR.

## 2020-10-21 NOTE — REMOTE MONITORING
Called Re: Sepsis Bundle Rpt LAC. Spoke to Ana Lilia Lkue       Thank you!     Kingston Slade RN, [facility]  [phone]

## 2020-10-21 NOTE — DIABETES MGMT
[facility]  CLINICAL NURSE SPECIALIST CONSULT  PROGRAM FOR DIABETES HEALTH    INITIAL NOTE    Presentation   Pearl Lopez is a 67 y.o. female admitted 10/20/20 with altered mental status and high blood sugar. EMS was called and BG were reading high on meter she was also confused in her speech. Patient is frequently admitted to the hospital for DKA. On arrival to the hospital, her vital signs were within normal limits but was breathing 30/min due to kussmaul breathing due to severe metabolic acidosis with Bicarb <5 on BMP and blood sugar in 800's. Also elevated WBC with obvious yeast in the genitourianry tract area. Lactic acid 5.2. Anion gap 22 on admission. CT ABD and pelvis no acute findings and CXR was clear. CT Head: No acute intracranial hemorrhage, mass or infarct. Stable pattern of atrophy and chronic white matter change most compatible with small vessel ischemic and/or senescent change. HX:   Past Medical History:   Diagnosis Date    Heart failure ([facility] [address]     unknown to family    Hypertension     Stroke [facility])      DX: History of diabetes mellitus type 1. ONEYDA. DKA. Acute encephalopathy. Sepsis. TX: insulin. BP meds. Clot prevention. IV ABX. Current clinical course has been complicated by admission for another episode of DKA. Diabetes: Patient has known Type 1 diabetes, treated with insulin PTA. A1C this admission was 13.2%. Patient lives at home with two sons and one son has special needs. She says they take care of each other and her other children come and check on them. Patient has repeated admissions for DKA. It was recommended on the previous admission that she not be discharged on meal time insulin since she does not consistently eat well and also a lower dose of Tresiba. Family history unknown for diabetes.        Consulted by Provider for advanced diabetes nursing assessment and care, specifically related to   [x] Inpatient management strategy  [x] Home management assessment    Diabetes-related medical history  Acute complications  DKA  Neurological complications  Macrovascular disease  CAD and Cerebral vascular accident    Diabetes medication history  Drug class Currently in use Discontinued Never used   Biguanide      DDP-4 inhibitor       Sulfonylurea      Thiazolidinedione      GLP-1 RA      SGLT-2 inhibitors      Basal insulin 14 units Tresiba     Fixed Dose  Combinations      Bolus insulin Humalog 3 units with meals       Subjective   I want to save this food for later.     Patient reports the following home diabetes self-care practices:  Eating pattern  [x] Breakfast oatmeal  [x] Dinner  Chicken and potatoes    Physical activity pattern: able to walk around the house and do minimal house chores. Monitoring pattern: when asked if she checks her blood sugars she says sometimes. Also asked if her son checks them and she says she can't remember. Taking medications pattern  [x] Consistent administration  [x] Affordable    Objective   Physical exam  General Alert, oriented 2-3. Pleasantly confused but in no acute distress. Conversant and   cooperative. Vital Signs Normotensive. Afebrile.  On RA. NSR  Visit Vitals  BP (!) 118/50 (BP 1 Location: Right arm, BP Patient Position: At rest)   Pulse 75   Temp 97.7 °F (36.5 °C)   Resp 16   Wt 66.9 kg (147 lb 7.8 oz)   SpO2 100%   BMI 25.32 kg/m²     Laboratory  Lab Results   Component Value Date/Time    Hemoglobin A1c 13.2 (H) 10/21/2020 05:41 AM     Lab Results   Component Value Date/Time    LDL, calculated 55.6 06/21/2020 05:05 PM     Lab Results   Component Value Date/Time    Creatinine (POC) 1.2 10/20/2020 06:00 PM    Creatinine 1.38 (H) 10/21/2020 10:34 AM     Lab Results   Component Value Date/Time    Sodium 151 (H) 10/21/2020 10:34 AM    Potassium 3.6 10/21/2020 10:34 AM    Chloride 117 (H) 10/21/2020 10:34 AM    CO2 26 10/21/2020 10:34 AM    Anion gap 8 10/21/2020 10:34 AM    Glucose 80 10/21/2020 10:34 AM    BUN 20 10/21/2020 10:34 AM    Creatinine 1.38 (H) 10/21/2020 10:34 AM    BUN/Creatinine ratio 14 10/21/2020 10:34 AM    GFR est AA 45 (L) 10/21/2020 10:34 AM    GFR est non-AA 37 (L) 10/21/2020 10:34 AM    Calcium 7.7 (L) 10/21/2020 10:34 AM    Bilirubin, total 0.5 10/20/2020 05:48 PM    Alk. phosphatase 205 (H) 10/20/2020 05:48 PM    Protein, total 6.4 10/20/2020 05:48 PM    Albumin 2.9 (L) 10/20/2020 05:48 PM    Globulin 3.5 10/20/2020 05:48 PM    A-G Ratio 0.8 (L) 10/20/2020 05:48 PM    ALT (SGPT) 111 (H) 10/20/2020 05:48 PM     Lab Results   Component Value Date/Time    ALT (SGPT) 111 (H) 10/20/2020 05:48 PM       Factors affecting BG pattern  Factor Dose Comments   Nutrition:  Carb-controlled meals   60 grams/meal   Nothing documented today   Infection IV Diflucan and Zosyn Blood cultures negative  UA+ yeast   Other: ONEYDA  GFR 45/Creatinine 1.38     Blood glucose pattern        Assessment and Plan   Nursing Diagnosis Risk for unstable blood glucose pattern   Nursing Intervention Domain 5255 Decision-making Support   Nursing Interventions Examined current inpatient diabetes control   Explored factors facilitating and impeding inpatient management  Identified self-management practices impeding diabetes control  Explored corrective strategies with patient and responsible inpatient provider   Informed patient of rational for insulin strategy while hospitalized     Evaluation   This  woman, with Type 1 diabetes, has now achieved inpatient blood glucose target of 100-180mg/dl with Glucostabilizer (GS). It was initiated yesterday 10/20/20 at 1930 in addition to 10 units of regular insulin that was given in the ED. At  today she was transitioned with 18 units of Lantus. While on GS for less than 24 hrs she used a total of 232. 3mL of insulin and had to be given D50 on two occasions.  Would recommend that we change to NPH in preparation to plan for discharge since this is a cheaper insulin and start at 0.3units/kg/.D. Then she is also renal dosed per her decreased kidney function. Suspect she does not need meal time insulin right now since her eating is inconsistent and not substantial enough. Would also recommend a normal sensitivity correction scale given her BMI. Inpatient blood glucose management has been impacted by  [x] Kidney dysfunction  [x] Erratic meal consumption    Recommendations   Recommend:    Change Basal insulin to  [x] 0.3 units/kg/D- NPH 10 units BID to start in the morning     Corrective insulin  [x] Normal sensitivity    Billing Code(s)   I personally reviewed chart, notes, data and current medications in the medical record, and examined the patient at bedside before making care recommendations.      [x] 19752 IP subsequent hospital care - 45 minutes      Aida Lagos MSN, RN, ACCNS-AG  Aida Lagos, CNS  Program for Diabetes Health  Access via [employer]

## 2020-10-21 NOTE — PROGRESS NOTES
Pharmacy Automatic Renal Dosing Protocol - Antimicrobials    Indication for Antimicrobials: Sepsis of Unknown Origin and UTI    Current Regimen of Each Antimicrobial:  Piperacillin tazobactam 3.375 grams Q8H (Start Date 10/20; Day # 2)  Fluconazole 200 mg daily x 3 (Start Date 10/20, Day 2)    Previous Antimicrobial Therapy:    Significant Cultures:   10/20: PBCx - NGTD - prelim  10/20: urine -hold    Radiology / Imaging results: (X-ray, CT scan or MRI): no acute findings    Labs:  Recent Labs     10/21/20  0541 10/21/20  0153 10/20/20  1748   CREA 1.64* 1.75* 1.80*   BUN 22* 24* 29*   WBC 27.6*  --  30.7*     Temp (24hrs), Av.7 °F (37.1 °C), Min:98.6 °F (37 °C), Max:98.8 °F (37.1 °C)      Paralysis, amputations, malnutrition:   Creatinine Clearance (mL/min) or Dialysis: 24.4 ml/min    Impression/Plan:   Antibiotics as above  BMP Q4H     Pharmacy will follow daily and adjust medications as appropriate for renal function and/or serum levels. Thank you,  YOUSIF Parks      Recommended duration of therapy  [email or web address]/[place]/[facility]/Pharmacy/Clinical%20Companion/Duration%20of%20ABX%20therapy. docx    Renal Dosing  [email or web address]/[place]/[facility]/Pharmacy/Clinical%20Companion/Renal%[email or web address]. pdf

## 2020-10-21 NOTE — ED NOTES
Pt sleeping but easily aroused and asking how her sugar is doing. Pt instructed that it is doing a lot better.

## 2020-10-22 ENCOUNTER — HOME CARE VISIT (OUTPATIENT)
Dept: HOME HEALTH SERVICES | Facility: HOME HEALTH | Age: 80
End: 2020-10-22
Payer: MEDICARE

## 2020-10-22 LAB
ANION GAP SERPL CALC-SCNC: 9 MMOL/L (ref 5–15)
BASOPHILS # BLD: 0 K/UL (ref 0–0.1)
BASOPHILS NFR BLD: 0 % (ref 0–1)
BLASTS NFR BLD MANUAL: 0 %
BUN SERPL-MCNC: 17 MG/DL (ref 6–20)
BUN/CREAT SERPL: 14 (ref 12–20)
CALCIUM SERPL-MCNC: 7.9 MG/DL (ref 8.5–10.1)
CHLORIDE SERPL-SCNC: 116 MMOL/L (ref 97–108)
CO2 SERPL-SCNC: 22 MMOL/L (ref 21–32)
CREAT SERPL-MCNC: 1.22 MG/DL (ref 0.55–1.02)
DIFFERENTIAL METHOD BLD: ABNORMAL
EOSINOPHIL # BLD: 0 K/UL (ref 0–0.4)
EOSINOPHIL NFR BLD: 0 % (ref 0–7)
ERYTHROCYTE [DISTWIDTH] IN BLOOD BY AUTOMATED COUNT: 15.6 % (ref 11.5–14.5)
GLUCOSE BLD STRIP.AUTO-MCNC: 101 MG/DL (ref 65–100)
GLUCOSE BLD STRIP.AUTO-MCNC: 151 MG/DL (ref 65–100)
GLUCOSE BLD STRIP.AUTO-MCNC: 216 MG/DL (ref 65–100)
GLUCOSE BLD STRIP.AUTO-MCNC: 78 MG/DL (ref 65–100)
GLUCOSE SERPL-MCNC: 76 MG/DL (ref 65–100)
HCT VFR BLD AUTO: 35.3 % (ref 35–47)
HGB BLD-MCNC: 12.2 G/DL (ref 11.5–16)
IMM GRANULOCYTES # BLD AUTO: 0 K/UL
IMM GRANULOCYTES NFR BLD AUTO: 0 %
LACTATE SERPL-SCNC: 1.9 MMOL/L (ref 0.4–2)
LYMPHOCYTES # BLD: 1.1 K/UL (ref 0.8–3.5)
LYMPHOCYTES NFR BLD: 6 % (ref 12–49)
MAGNESIUM SERPL-MCNC: 1.7 MG/DL (ref 1.6–2.4)
MCH RBC QN AUTO: 31.1 PG (ref 26–34)
MCHC RBC AUTO-ENTMCNC: 34.6 G/DL (ref 30–36.5)
MCV RBC AUTO: 90.1 FL (ref 80–99)
METAMYELOCYTES NFR BLD MANUAL: 0 %
MONOCYTES # BLD: 0.7 K/UL (ref 0–1)
MONOCYTES NFR BLD: 4 % (ref 5–13)
MYELOCYTES NFR BLD MANUAL: 0 %
NEUTS BAND NFR BLD MANUAL: 0 % (ref 0–6)
NEUTS SEG # BLD: 16.4 K/UL (ref 1.8–8)
NEUTS SEG NFR BLD: 90 % (ref 32–75)
NRBC # BLD: 0.07 K/UL (ref 0–0.01)
NRBC BLD-RTO: 0.4 PER 100 WBC
OTHER CELLS NFR BLD MANUAL: 0 %
PLATELET # BLD AUTO: 289 K/UL (ref 150–400)
PMV BLD AUTO: 9.3 FL (ref 8.9–12.9)
POTASSIUM SERPL-SCNC: 3.9 MMOL/L (ref 3.5–5.1)
PROMYELOCYTES NFR BLD MANUAL: 0 %
RBC # BLD AUTO: 3.92 M/UL (ref 3.8–5.2)
RBC MORPH BLD: ABNORMAL
SERVICE CMNT-IMP: ABNORMAL
SERVICE CMNT-IMP: NORMAL
SODIUM SERPL-SCNC: 147 MMOL/L (ref 136–145)
WBC # BLD AUTO: 18.2 K/UL (ref 3.6–11)

## 2020-10-22 PROCEDURE — 74011250637 HC RX REV CODE- 250/637: Performed by: INTERNAL MEDICINE

## 2020-10-22 PROCEDURE — 74011250636 HC RX REV CODE- 250/636: Performed by: INTERNAL MEDICINE

## 2020-10-22 PROCEDURE — 3331090001 HH PPS REVENUE CREDIT

## 2020-10-22 PROCEDURE — 83605 ASSAY OF LACTIC ACID: CPT

## 2020-10-22 PROCEDURE — 80048 BASIC METABOLIC PNL TOTAL CA: CPT

## 2020-10-22 PROCEDURE — 65660000000 HC RM CCU STEPDOWN

## 2020-10-22 PROCEDURE — 83735 ASSAY OF MAGNESIUM: CPT

## 2020-10-22 PROCEDURE — 74011000258 HC RX REV CODE- 258: Performed by: INTERNAL MEDICINE

## 2020-10-22 PROCEDURE — 82962 GLUCOSE BLOOD TEST: CPT

## 2020-10-22 PROCEDURE — 3331090002 HH PPS REVENUE DEBIT

## 2020-10-22 PROCEDURE — 74011636637 HC RX REV CODE- 636/637: Performed by: INTERNAL MEDICINE

## 2020-10-22 PROCEDURE — 85027 COMPLETE CBC AUTOMATED: CPT

## 2020-10-22 PROCEDURE — 36415 COLL VENOUS BLD VENIPUNCTURE: CPT

## 2020-10-22 RX ORDER — INSULIN GLARGINE 100 [IU]/ML
18 INJECTION, SOLUTION SUBCUTANEOUS DAILY
Status: DISCONTINUED | OUTPATIENT
Start: 2020-10-23 | End: 2020-10-24

## 2020-10-22 RX ORDER — INSULIN LISPRO 100 [IU]/ML
3 INJECTION, SOLUTION INTRAVENOUS; SUBCUTANEOUS
Status: DISCONTINUED | OUTPATIENT
Start: 2020-10-22 | End: 2020-10-24

## 2020-10-22 RX ORDER — INSULIN LISPRO 100 [IU]/ML
2 INJECTION, SOLUTION INTRAVENOUS; SUBCUTANEOUS
Status: DISCONTINUED | OUTPATIENT
Start: 2020-10-22 | End: 2020-10-25 | Stop reason: HOSPADM

## 2020-10-22 RX ADMIN — Medication 20 ML: at 13:54

## 2020-10-22 RX ADMIN — LEVOTHYROXINE SODIUM 175 MCG: 0.15 TABLET ORAL at 09:03

## 2020-10-22 RX ADMIN — PIPERACILLIN AND TAZOBACTAM 3.38 G: 3; .375 INJECTION, POWDER, LYOPHILIZED, FOR SOLUTION INTRAVENOUS at 17:33

## 2020-10-22 RX ADMIN — INSULIN LISPRO 2 UNITS: 100 INJECTION, SOLUTION INTRAVENOUS; SUBCUTANEOUS at 17:32

## 2020-10-22 RX ADMIN — HEPARIN SODIUM 5000 UNITS: 5000 INJECTION INTRAVENOUS; SUBCUTANEOUS at 21:12

## 2020-10-22 RX ADMIN — INSULIN LISPRO 3 UNITS: 100 INJECTION, SOLUTION INTRAVENOUS; SUBCUTANEOUS at 09:08

## 2020-10-22 RX ADMIN — PIPERACILLIN AND TAZOBACTAM 3.38 G: 3; .375 INJECTION, POWDER, LYOPHILIZED, FOR SOLUTION INTRAVENOUS at 09:08

## 2020-10-22 RX ADMIN — BRIMONIDINE TARTRATE 1 DROP: 2 SOLUTION/ DROPS OPHTHALMIC at 21:12

## 2020-10-22 RX ADMIN — Medication 10 ML: at 21:12

## 2020-10-22 RX ADMIN — POTASSIUM CHLORIDE: 2 INJECTION, SOLUTION, CONCENTRATE INTRAVENOUS at 10:18

## 2020-10-22 RX ADMIN — CLOPIDOGREL BISULFATE 75 MG: 75 TABLET ORAL at 09:03

## 2020-10-22 RX ADMIN — AMLODIPINE BESYLATE 10 MG: 5 TABLET ORAL at 09:03

## 2020-10-22 RX ADMIN — INSULIN LISPRO 3 UNITS: 100 INJECTION, SOLUTION INTRAVENOUS; SUBCUTANEOUS at 12:21

## 2020-10-22 RX ADMIN — METOPROLOL SUCCINATE 25 MG: 50 TABLET, EXTENDED RELEASE ORAL at 09:02

## 2020-10-22 RX ADMIN — HEPARIN SODIUM 5000 UNITS: 5000 INJECTION INTRAVENOUS; SUBCUTANEOUS at 07:10

## 2020-10-22 RX ADMIN — Medication 1 AMPULE: at 21:00

## 2020-10-22 RX ADMIN — NYSTATIN: 100000 POWDER TOPICAL at 17:32

## 2020-10-22 RX ADMIN — HEPARIN SODIUM 5000 UNITS: 5000 INJECTION INTRAVENOUS; SUBCUTANEOUS at 13:55

## 2020-10-22 RX ADMIN — PIPERACILLIN AND TAZOBACTAM 3.38 G: 3; .375 INJECTION, POWDER, LYOPHILIZED, FOR SOLUTION INTRAVENOUS at 02:33

## 2020-10-22 RX ADMIN — BRIMONIDINE TARTRATE 1 DROP: 2 SOLUTION/ DROPS OPHTHALMIC at 09:10

## 2020-10-22 RX ADMIN — Medication 1 AMPULE: at 09:08

## 2020-10-22 RX ADMIN — NYSTATIN: 100000 POWDER TOPICAL at 09:10

## 2020-10-22 NOTE — PROGRESS NOTES
Bedside shift change report given to Bonnie Sow (oncoming nurse) by Pawan Duong (offgoing nurse). Report included the following information SBAR, Kardex, Intake/Output, MAR, Recent Results and Cardiac Rhythm NSR.

## 2020-10-22 NOTE — PROGRESS NOTES
[employer] message   FROM [address]  1940     [phone]   MESSAGE  patient admitted on 10/20/2020 for altered mental status and Kussmaul breathing related to DKA, acute kidney injury (related to dehydration). Insulin drip was stopped today around 5 PM.  Currently the patient has no diabetic medications due at this time. She has a Lantus dose due tomorrow of 18 units every bedtime. I am concerned because this patient is not ACHS or q 6 and is also not on sliding scale if coverage was needed. Her current blood sugar is 99. She did get 18 units today at 3:20 PM today. Thanks       NEEDS CALL BACK:  No callback requested       Called nurse back: N/A         Discussion / orders:  Reviewed patient medication list and confirm that patient is not on sliding scale insulin coverage but there is no order for blood glucose monitoring. Ordered before meals and at bedtime blood glucose checks as well as sliding scale coverage. Notified patient's nurse of this via [employer].

## 2020-10-22 NOTE — DIABETES MGMT
handling diabetes management at this time.  Will sign off.     Jessa Martinez MSN, RN, [facility]   Diabetes Clinical Nurse Specialist   [address]

## 2020-10-22 NOTE — PROGRESS NOTES
General Daily Progress Note    Admit Date: 10/20/2020    Subjective:     Patient has no complaint . Duke Spear      Current Facility-Administered Medications   Medication Dose Route Frequency    [START ON 10/23/2020] insulin glargine (LANTUS) injection 18 Units  18 Units SubCUTAneous DAILY    insulin lispro (HUMALOG) injection 3 Units  3 Units SubCUTAneous ACB    insulin lispro (HUMALOG) injection 3 Units  3 Units SubCUTAneous ACL    insulin lispro (HUMALOG) injection 2 Units  2 Units SubCUTAneous ACD    alcohol 62% (NOZIN) nasal  1 Ampule  1 Ampule Topical Q12H    nystatin (MYCOSTATIN) 100,000 unit/gram powder   Topical BID    zinc oxide-cod liver oil (DESITIN) 40 % paste   Topical PRN    lip protectant (BLISTEX) ointment 1 Each  1 Each Topical QID PRN    glucose chewable tablet 16 g  4 Tab Oral PRN    dextrose (D50W) injection syrg 12.5-25 g  12.5-25 g IntraVENous PRN    glucagon (GLUCAGEN) injection 1 mg  1 mg IntraMUSCular PRN    dextrose 5 % - 0.45% NaCl 1,000 mL with potassium chloride 30 mEq infusion   IntraVENous CONTINUOUS    sodium chloride (NS) flush 5-10 mL  5-10 mL IntraVENous PRN    amLODIPine (NORVASC) tablet 10 mg  10 mg Oral DAILY    brimonidine (ALPHAGAN) 0.2 % ophthalmic solution 1 Drop  1 Drop Both Eyes BID    clopidogreL (PLAVIX) tablet 75 mg  75 mg Oral DAILY    levothyroxine (SYNTHROID) tablet 175 mcg  175 mcg Oral ACB    metoprolol succinate (TOPROL-XL) XL tablet 25 mg  25 mg Oral DAILY    sodium chloride (NS) flush 5-40 mL  5-40 mL IntraVENous Q8H    sodium chloride (NS) flush 5-40 mL  5-40 mL IntraVENous PRN    ondansetron (ZOFRAN ODT) tablet 4 mg  4 mg Oral Q8H PRN    Or    ondansetron (ZOFRAN) injection 4 mg  4 mg IntraVENous Q6H PRN    heparin (porcine) injection 5,000 Units  5,000 Units SubCUTAneous Q8H    piperacillin-tazobactam (ZOSYN) 3.375 g in 0.9% sodium chloride (MBP/ADV) 100 mL  3.375 g IntraVENous Q8H        Review of Systems  A comprehensive review of systems was negative. Objective:     Patient Vitals for the past 24 hrs:   BP Temp Pulse Resp SpO2   10/22/20 0726 (!) 132/57 98.4 °F (36.9 °C) 72 16 100 %   10/22/20 0304 (!) 123/53 98.2 °F (36.8 °C) 71 16 100 %   10/22/20 0303   70 18 100 %   10/21/20 2306 130/63 97.9 °F (36.6 °C) 76 16 100 %   10/21/20 2305   77 19 100 %   10/21/20 1923 (!) 117/58 98 °F (36.7 °C) 78 19 100 %   10/21/20 1600   75     10/21/20 1451 (!) 118/50 97.7 °F (36.5 °C) 75 16 100 %   10/21/20 1400 (!) 113/58  76 23 100 %   10/21/20 1300 (!) 126/58  78 24 97 %   10/21/20 1200 117/62 98.6 °F (37 °C) 77 16 99 %   10/21/20 1130 (!) 118/54  77 19 99 %   10/21/20 1100 (!) 121/104  77 18 99 %   10/21/20 1000 (!) 106/47  72 16 99 %   10/21/20 0900 (!) 117/49  76 15 99 %     No intake/output data recorded. 10/20 1901 - 10/22 0700  In: 2124.8 [I.V.:2124.8]  Out: 650 [Urine:650]    Physical Exam:   Visit Vitals  BP (!) 132/57 (BP 1 Location: Right arm, BP Patient Position: At rest)   Pulse 72   Temp 98.4 °F (36.9 °C)   Resp 16   Wt 147 lb 7.8 oz (66.9 kg)   SpO2 100%   BMI 25.32 kg/m²     General appearance: alert, cooperative, no distress, appears stated age  Neck: supple, symmetrical, trachea midline, no adenopathy, thyroid: not enlarged, symmetric, no tenderness/mass/nodules, no carotid bruit and no JVD  Lungs: clear to auscultation bilaterally  Heart: regular rate and rhythm, S1, S2 normal, no murmur, click, rub or gallop  Abdomen: soft, non-tender. Bowel sounds normal. No masses,  no organomegaly  Extremities: extremities normal, atraumatic, no cyanosis or edema    Assessment:     Active Problems:    Vascular dementia without behavioral disturbance ([address] (10/20/2018)      ONEYDA (acute kidney injury) ([address] (9/16/2020)      Hyperkalemia (10/20/2020)      DKA, type 1 ([facility] [address] (10/20/2020)      Severe sepsis ([address] (10/20/2020)      Candidiasis (10/20/2020)        Plan:     1. Continue diabetic control.   Patient is in the process of being transition to basal bolus preparation. I have to watch hypoglycemia. Continue dextrose for now. 2.  Caloric intake appears to be improving. 3.  Continue hydration.

## 2020-10-22 NOTE — PROGRESS NOTES
1920: Bedside and Verbal shift change report given to issac (oncoming nurse) by Al Galvan (offgoing nurse). Report included the following information SBAR, Kardex, Intake/Output, MAR, Recent Results and Cardiac Rhythm nsr.     1923: vs taken. Mews 1. Currently in bed watching tv.    2013: NP contacted about pt diabteic regimen. This RN concerned since patient has no orders for POC ACHS or Q6. Lantus was given today. Next dose due tomorrow evening. No sliding scale on MAR. Blood glucose checked. Pt BG 99. Pt in no acute distress at this time    2021: NP ordered Np added POC glucose check and sliding scale. Will monitor     2122: Dr. Kennedi Gutierrez added orders: pt now BG check before meals, sliding scale before meals, 18 units of lantus at bedtime. 2306: vs done. Mews 1. Currently in bed watching tv  0050: NP contacted regarding last lactic this morning at 0543. Lactic was 5.7. No recollect ordered. this rn requested a lactic to be added to am labs. An order for a lactic has been placed. Will draw with am labs      0221: lactic 1.9 with am labs   0304: vs done. Mews 1. In bed resting    0630: pt had period of confusion. Pt observed attempting to get out of bed. Bed alarm on. Pt reoriented to bed easily, no complaints or issues. 0720: Bedside and Verbal shift change report given to demetrice (oncoming nurse) by Angela Fletcher (offgoing nurse). Report included the following information SBAR, Kardex, Intake/Output, MAR, Recent Results and Cardiac Rhythm nsr.

## 2020-10-23 LAB
ANION GAP SERPL CALC-SCNC: 11 MMOL/L (ref 5–15)
ANION GAP SERPL CALC-SCNC: 8 MMOL/L (ref 5–15)
BUN SERPL-MCNC: 10 MG/DL (ref 6–20)
BUN SERPL-MCNC: 11 MG/DL (ref 6–20)
BUN/CREAT SERPL: 10 (ref 12–20)
BUN/CREAT SERPL: 11 (ref 12–20)
CALCIUM SERPL-MCNC: 7.2 MG/DL (ref 8.5–10.1)
CALCIUM SERPL-MCNC: 7.3 MG/DL (ref 8.5–10.1)
CHLORIDE SERPL-SCNC: 110 MMOL/L (ref 97–108)
CHLORIDE SERPL-SCNC: 111 MMOL/L (ref 97–108)
CO2 SERPL-SCNC: 18 MMOL/L (ref 21–32)
CO2 SERPL-SCNC: 19 MMOL/L (ref 21–32)
CREAT SERPL-MCNC: 0.92 MG/DL (ref 0.55–1.02)
CREAT SERPL-MCNC: 1.11 MG/DL (ref 0.55–1.02)
GLUCOSE BLD STRIP.AUTO-MCNC: 145 MG/DL (ref 65–100)
GLUCOSE BLD STRIP.AUTO-MCNC: 295 MG/DL (ref 65–100)
GLUCOSE BLD STRIP.AUTO-MCNC: 383 MG/DL (ref 65–100)
GLUCOSE SERPL-MCNC: 150 MG/DL (ref 65–100)
GLUCOSE SERPL-MCNC: 283 MG/DL (ref 65–100)
POTASSIUM SERPL-SCNC: 4.4 MMOL/L (ref 3.5–5.1)
POTASSIUM SERPL-SCNC: 4.7 MMOL/L (ref 3.5–5.1)
SERVICE CMNT-IMP: ABNORMAL
SODIUM SERPL-SCNC: 138 MMOL/L (ref 136–145)
SODIUM SERPL-SCNC: 139 MMOL/L (ref 136–145)

## 2020-10-23 PROCEDURE — 65660000000 HC RM CCU STEPDOWN

## 2020-10-23 PROCEDURE — 74011000258 HC RX REV CODE- 258: Performed by: INTERNAL MEDICINE

## 2020-10-23 PROCEDURE — 74011250637 HC RX REV CODE- 250/637: Performed by: INTERNAL MEDICINE

## 2020-10-23 PROCEDURE — 36415 COLL VENOUS BLD VENIPUNCTURE: CPT

## 2020-10-23 PROCEDURE — 74011250636 HC RX REV CODE- 250/636: Performed by: INTERNAL MEDICINE

## 2020-10-23 PROCEDURE — 80048 BASIC METABOLIC PNL TOTAL CA: CPT

## 2020-10-23 PROCEDURE — 3331090002 HH PPS REVENUE DEBIT

## 2020-10-23 PROCEDURE — 82962 GLUCOSE BLOOD TEST: CPT

## 2020-10-23 PROCEDURE — 3331090001 HH PPS REVENUE CREDIT

## 2020-10-23 PROCEDURE — 74011636637 HC RX REV CODE- 636/637: Performed by: INTERNAL MEDICINE

## 2020-10-23 RX ORDER — ACETAMINOPHEN 325 MG/1
650 TABLET ORAL
Status: DISCONTINUED | OUTPATIENT
Start: 2020-10-23 | End: 2020-10-25 | Stop reason: HOSPADM

## 2020-10-23 RX ADMIN — BRIMONIDINE TARTRATE 1 DROP: 2 SOLUTION/ DROPS OPHTHALMIC at 09:32

## 2020-10-23 RX ADMIN — PIPERACILLIN AND TAZOBACTAM 3.38 G: 3; .375 INJECTION, POWDER, LYOPHILIZED, FOR SOLUTION INTRAVENOUS at 17:59

## 2020-10-23 RX ADMIN — BRIMONIDINE TARTRATE 1 DROP: 2 SOLUTION/ DROPS OPHTHALMIC at 20:15

## 2020-10-23 RX ADMIN — PIPERACILLIN AND TAZOBACTAM 3.38 G: 3; .375 INJECTION, POWDER, LYOPHILIZED, FOR SOLUTION INTRAVENOUS at 03:30

## 2020-10-23 RX ADMIN — INSULIN GLARGINE 18 UNITS: 100 INJECTION, SOLUTION SUBCUTANEOUS at 09:33

## 2020-10-23 RX ADMIN — CLOPIDOGREL BISULFATE 75 MG: 75 TABLET ORAL at 09:30

## 2020-10-23 RX ADMIN — Medication 1 AMPULE: at 20:15

## 2020-10-23 RX ADMIN — HEPARIN SODIUM 5000 UNITS: 5000 INJECTION INTRAVENOUS; SUBCUTANEOUS at 21:00

## 2020-10-23 RX ADMIN — POTASSIUM CHLORIDE: 2 INJECTION, SOLUTION, CONCENTRATE INTRAVENOUS at 08:49

## 2020-10-23 RX ADMIN — NYSTATIN: 100000 POWDER TOPICAL at 17:58

## 2020-10-23 RX ADMIN — Medication 20 ML: at 04:43

## 2020-10-23 RX ADMIN — ACETAMINOPHEN 650 MG: 325 TABLET ORAL at 16:19

## 2020-10-23 RX ADMIN — PIPERACILLIN AND TAZOBACTAM 3.38 G: 3; .375 INJECTION, POWDER, LYOPHILIZED, FOR SOLUTION INTRAVENOUS at 09:34

## 2020-10-23 RX ADMIN — Medication 1 AMPULE: at 10:25

## 2020-10-23 RX ADMIN — HEPARIN SODIUM 5000 UNITS: 5000 INJECTION INTRAVENOUS; SUBCUTANEOUS at 07:20

## 2020-10-23 RX ADMIN — INSULIN LISPRO 3 UNITS: 100 INJECTION, SOLUTION INTRAVENOUS; SUBCUTANEOUS at 12:19

## 2020-10-23 RX ADMIN — INSULIN LISPRO 3 UNITS: 100 INJECTION, SOLUTION INTRAVENOUS; SUBCUTANEOUS at 07:45

## 2020-10-23 RX ADMIN — Medication 10 ML: at 20:15

## 2020-10-23 RX ADMIN — NYSTATIN: 100000 POWDER TOPICAL at 09:35

## 2020-10-23 RX ADMIN — POTASSIUM CHLORIDE: 2 INJECTION, SOLUTION, CONCENTRATE INTRAVENOUS at 20:15

## 2020-10-23 RX ADMIN — METOPROLOL SUCCINATE 25 MG: 50 TABLET, EXTENDED RELEASE ORAL at 09:29

## 2020-10-23 RX ADMIN — INSULIN LISPRO 2 UNITS: 100 INJECTION, SOLUTION INTRAVENOUS; SUBCUTANEOUS at 16:19

## 2020-10-23 RX ADMIN — Medication 10 ML: at 16:14

## 2020-10-23 RX ADMIN — AMLODIPINE BESYLATE 10 MG: 5 TABLET ORAL at 09:31

## 2020-10-23 RX ADMIN — LEVOTHYROXINE SODIUM 175 MCG: 0.15 TABLET ORAL at 07:42

## 2020-10-23 RX ADMIN — HEPARIN SODIUM 5000 UNITS: 5000 INJECTION INTRAVENOUS; SUBCUTANEOUS at 16:14

## 2020-10-23 NOTE — PROGRESS NOTES
[place] Plan:    D/C Home with SPENSER with [facility]  F/U appointment  2nd IM Letter  Family to transport at d/c    CM reviewed pt chart. Pt admitted for DKA and AMS. Pt has dementia but was altered from baseline on admission. Pt alert, pt improving clinically. Possible d/c this weekend. CM discussed d/c plan with Daughter-  Alex Covington. She agreed with d/c plan and gave verbal consent for 2nd IM Letter. Medicare pt has received, reviewed, and signed 2nd IM letter informing them of their right to appeal the discharge. Signed copied has been placed on pt bedside chart.     [address] [facility]  Phone: [phone]

## 2020-10-23 NOTE — PROGRESS NOTES
General Daily Progress Note    Admit Date: 10/20/2020    Subjective:     Patient has no complaint . Nelida Toro      Current Facility-Administered Medications   Medication Dose Route Frequency    0.45% sodium chloride 1,000 mL with potassium chloride 30 mEq infusion   IntraVENous CONTINUOUS    insulin glargine (LANTUS) injection 18 Units  18 Units SubCUTAneous DAILY    insulin lispro (HUMALOG) injection 3 Units  3 Units SubCUTAneous ACB    insulin lispro (HUMALOG) injection 3 Units  3 Units SubCUTAneous ACL    insulin lispro (HUMALOG) injection 2 Units  2 Units SubCUTAneous ACD    alcohol 62% (NOZIN) nasal  1 Ampule  1 Ampule Topical Q12H    nystatin (MYCOSTATIN) 100,000 unit/gram powder   Topical BID    zinc oxide-cod liver oil (DESITIN) 40 % paste   Topical PRN    lip protectant (BLISTEX) ointment 1 Each  1 Each Topical QID PRN    glucose chewable tablet 16 g  4 Tab Oral PRN    dextrose (D50W) injection syrg 12.5-25 g  12.5-25 g IntraVENous PRN    glucagon (GLUCAGEN) injection 1 mg  1 mg IntraMUSCular PRN    sodium chloride (NS) flush 5-10 mL  5-10 mL IntraVENous PRN    amLODIPine (NORVASC) tablet 10 mg  10 mg Oral DAILY    brimonidine (ALPHAGAN) 0.2 % ophthalmic solution 1 Drop  1 Drop Both Eyes BID    clopidogreL (PLAVIX) tablet 75 mg  75 mg Oral DAILY    levothyroxine (SYNTHROID) tablet 175 mcg  175 mcg Oral ACB    metoprolol succinate (TOPROL-XL) XL tablet 25 mg  25 mg Oral DAILY    sodium chloride (NS) flush 5-40 mL  5-40 mL IntraVENous Q8H    sodium chloride (NS) flush 5-40 mL  5-40 mL IntraVENous PRN    ondansetron (ZOFRAN ODT) tablet 4 mg  4 mg Oral Q8H PRN    Or    ondansetron (ZOFRAN) injection 4 mg  4 mg IntraVENous Q6H PRN    heparin (porcine) injection 5,000 Units  5,000 Units SubCUTAneous Q8H    piperacillin-tazobactam (ZOSYN) 3.375 g in 0.9% sodium chloride (MBP/ADV) 100 mL  3.375 g IntraVENous Q8H        Review of Systems  A comprehensive review of systems was negative. Objective:     Patient Vitals for the past 24 hrs:   BP Temp Pulse Resp SpO2   10/23/20 0752 122/74 98.5 °F (36.9 °C) 66 16 100 %   10/23/20 0345 132/83 98.1 °F (36.7 °C) 74 18 100 %   10/22/20 2330 (!) 118/59 97.8 °F (36.6 °C) 69 18 98 %   10/22/20 1929 117/66 97.9 °F (36.6 °C) 69 18 100 %   10/22/20 1736 129/73 98 °F (36.7 °C) 71 18 100 %   10/22/20 1054 (!) 121/59 98.4 °F (36.9 °C) 76 16 100 %   10/22/20 0902 (!) 132/57  77       No intake/output data recorded. 10/21 1901 - 10/23 0700  In: 3733.3 [P.O.:1200; I.V.:2533.3]  Out: 1200 [Urine:1200]    Physical Exam:   Visit Vitals  /74   Pulse 66   Temp 98.5 °F (36.9 °C)   Resp 16   Wt 142 lb 3.2 oz (64.5 kg)   SpO2 100%   BMI 24.41 kg/m²     General appearance: alert, cooperative, no distress, appears stated age  Neck: supple, symmetrical, trachea midline, no adenopathy, thyroid: not enlarged, symmetric, no tenderness/mass/nodules, no carotid bruit and no JVD  Lungs: clear to auscultation bilaterally  Heart: regular rate and rhythm, S1, S2 normal, no murmur, click, rub or gallop  Abdomen: soft, non-tender. Bowel sounds normal. No masses,  no organomegaly  Extremities: extremities normal, atraumatic, no cyanosis or edema    Assessment:     Active Problems:    Vascular dementia without behavioral disturbance ([facility] [address] (10/20/2018)      ONEYDA (acute kidney injury) ([facility] [address] (9/16/2020)      Hyperkalemia (10/20/2020)      DKA, type 1 ([address] (10/20/2020)      Severe sepsis ([facility] [address] (10/20/2020)      Candidiasis (10/20/2020)        Plan:     1. Continue diabetic control. Dextrose discontinued and can now get reasonable control with the basal bolus preparation. CO2 slightly low but this should improve. 2.  Continue hydration. 3.  Anticipate discharge this weekend.

## 2020-10-24 LAB
ANION GAP SERPL CALC-SCNC: 8 MMOL/L (ref 5–15)
BUN SERPL-MCNC: 9 MG/DL (ref 6–20)
BUN/CREAT SERPL: 13 (ref 12–20)
CALCIUM SERPL-MCNC: 7.6 MG/DL (ref 8.5–10.1)
CHLORIDE SERPL-SCNC: 108 MMOL/L (ref 97–108)
CO2 SERPL-SCNC: 22 MMOL/L (ref 21–32)
CREAT SERPL-MCNC: 0.72 MG/DL (ref 0.55–1.02)
GLUCOSE BLD STRIP.AUTO-MCNC: 101 MG/DL (ref 65–100)
GLUCOSE BLD STRIP.AUTO-MCNC: 123 MG/DL (ref 65–100)
GLUCOSE BLD STRIP.AUTO-MCNC: 43 MG/DL (ref 65–100)
GLUCOSE BLD STRIP.AUTO-MCNC: 44 MG/DL (ref 65–100)
GLUCOSE BLD STRIP.AUTO-MCNC: 62 MG/DL (ref 65–100)
GLUCOSE BLD STRIP.AUTO-MCNC: 63 MG/DL (ref 65–100)
GLUCOSE BLD STRIP.AUTO-MCNC: 72 MG/DL (ref 65–100)
GLUCOSE BLD STRIP.AUTO-MCNC: 87 MG/DL (ref 65–100)
GLUCOSE BLD STRIP.AUTO-MCNC: 96 MG/DL (ref 65–100)
GLUCOSE SERPL-MCNC: 87 MG/DL (ref 65–100)
POTASSIUM SERPL-SCNC: 4.1 MMOL/L (ref 3.5–5.1)
SERVICE CMNT-IMP: ABNORMAL
SERVICE CMNT-IMP: NORMAL
SODIUM SERPL-SCNC: 138 MMOL/L (ref 136–145)

## 2020-10-24 PROCEDURE — 82962 GLUCOSE BLOOD TEST: CPT

## 2020-10-24 PROCEDURE — 74011250636 HC RX REV CODE- 250/636: Performed by: INTERNAL MEDICINE

## 2020-10-24 PROCEDURE — 77030038269 HC DRN EXT URIN PURWCK BARD -A

## 2020-10-24 PROCEDURE — 74011000258 HC RX REV CODE- 258: Performed by: INTERNAL MEDICINE

## 2020-10-24 PROCEDURE — 80048 BASIC METABOLIC PNL TOTAL CA: CPT

## 2020-10-24 PROCEDURE — 74011250637 HC RX REV CODE- 250/637: Performed by: INTERNAL MEDICINE

## 2020-10-24 PROCEDURE — 3331090002 HH PPS REVENUE DEBIT

## 2020-10-24 PROCEDURE — 74011636637 HC RX REV CODE- 636/637: Performed by: INTERNAL MEDICINE

## 2020-10-24 PROCEDURE — 65660000000 HC RM CCU STEPDOWN

## 2020-10-24 PROCEDURE — 3331090001 HH PPS REVENUE CREDIT

## 2020-10-24 PROCEDURE — 74011000250 HC RX REV CODE- 250: Performed by: NURSE PRACTITIONER

## 2020-10-24 PROCEDURE — 36415 COLL VENOUS BLD VENIPUNCTURE: CPT

## 2020-10-24 RX ORDER — INSULIN LISPRO 100 [IU]/ML
2 INJECTION, SOLUTION INTRAVENOUS; SUBCUTANEOUS
Status: DISCONTINUED | OUTPATIENT
Start: 2020-10-25 | End: 2020-10-25 | Stop reason: HOSPADM

## 2020-10-24 RX ORDER — INSULIN GLARGINE 100 [IU]/ML
16 INJECTION, SOLUTION SUBCUTANEOUS DAILY
Status: DISCONTINUED | OUTPATIENT
Start: 2020-10-24 | End: 2020-10-25

## 2020-10-24 RX ADMIN — NYSTATIN: 100000 POWDER TOPICAL at 18:30

## 2020-10-24 RX ADMIN — DEXTROSE MONOHYDRATE 25 G: 25 INJECTION, SOLUTION INTRAVENOUS at 19:30

## 2020-10-24 RX ADMIN — HEPARIN SODIUM 5000 UNITS: 5000 INJECTION INTRAVENOUS; SUBCUTANEOUS at 14:41

## 2020-10-24 RX ADMIN — INSULIN LISPRO 2 UNITS: 100 INJECTION, SOLUTION INTRAVENOUS; SUBCUTANEOUS at 16:44

## 2020-10-24 RX ADMIN — Medication 10 ML: at 21:30

## 2020-10-24 RX ADMIN — Medication 1 AMPULE: at 21:30

## 2020-10-24 RX ADMIN — POTASSIUM CHLORIDE: 2 INJECTION, SOLUTION, CONCENTRATE INTRAVENOUS at 09:59

## 2020-10-24 RX ADMIN — CLOPIDOGREL BISULFATE 75 MG: 75 TABLET ORAL at 09:05

## 2020-10-24 RX ADMIN — INSULIN GLARGINE 16 UNITS: 100 INJECTION, SOLUTION SUBCUTANEOUS at 09:00

## 2020-10-24 RX ADMIN — INSULIN LISPRO 3 UNITS: 100 INJECTION, SOLUTION INTRAVENOUS; SUBCUTANEOUS at 08:59

## 2020-10-24 RX ADMIN — HEPARIN SODIUM 5000 UNITS: 5000 INJECTION INTRAVENOUS; SUBCUTANEOUS at 21:30

## 2020-10-24 RX ADMIN — METOPROLOL SUCCINATE 25 MG: 50 TABLET, EXTENDED RELEASE ORAL at 08:59

## 2020-10-24 RX ADMIN — BRIMONIDINE TARTRATE 1 DROP: 2 SOLUTION/ DROPS OPHTHALMIC at 21:31

## 2020-10-24 RX ADMIN — BRIMONIDINE TARTRATE 1 DROP: 2 SOLUTION/ DROPS OPHTHALMIC at 09:00

## 2020-10-24 RX ADMIN — Medication 10 ML: at 14:42

## 2020-10-24 RX ADMIN — Medication 10 ML: at 05:30

## 2020-10-24 RX ADMIN — PIPERACILLIN AND TAZOBACTAM 3.38 G: 3; .375 INJECTION, POWDER, LYOPHILIZED, FOR SOLUTION INTRAVENOUS at 03:15

## 2020-10-24 RX ADMIN — LEVOTHYROXINE SODIUM 175 MCG: 0.15 TABLET ORAL at 08:59

## 2020-10-24 RX ADMIN — HEPARIN SODIUM 5000 UNITS: 5000 INJECTION INTRAVENOUS; SUBCUTANEOUS at 05:30

## 2020-10-24 RX ADMIN — Medication 1 AMPULE: at 08:58

## 2020-10-24 RX ADMIN — INSULIN LISPRO 3 UNITS: 100 INJECTION, SOLUTION INTRAVENOUS; SUBCUTANEOUS at 11:52

## 2020-10-24 RX ADMIN — NYSTATIN: 100000 POWDER TOPICAL at 09:01

## 2020-10-24 RX ADMIN — AMLODIPINE BESYLATE 10 MG: 5 TABLET ORAL at 08:59

## 2020-10-24 NOTE — PROGRESS NOTES
General Daily Progress Note    Admit Date: 10/20/2020    Subjective:     Patient has no complaint . [place] Current Facility-Administered Medications   Medication Dose Route Frequency    insulin glargine (LANTUS) injection 16 Units  16 Units SubCUTAneous DAILY    [START ON 10/25/2020] insulin lispro (HUMALOG) injection 2 Units  2 Units SubCUTAneous ACB    0.45% sodium chloride 1,000 mL with potassium chloride 30 mEq infusion   IntraVENous CONTINUOUS    acetaminophen (TYLENOL) tablet 650 mg  650 mg Oral Q4H PRN    insulin lispro (HUMALOG) injection 3 Units  3 Units SubCUTAneous ACL    insulin lispro (HUMALOG) injection 2 Units  2 Units SubCUTAneous ACD    alcohol 62% (NOZIN) nasal  1 Ampule  1 Ampule Topical Q12H    nystatin (MYCOSTATIN) 100,000 unit/gram powder   Topical BID    zinc oxide-cod liver oil (DESITIN) 40 % paste   Topical PRN    lip protectant (BLISTEX) ointment 1 Each  1 Each Topical QID PRN    glucose chewable tablet 16 g  4 Tab Oral PRN    dextrose (D50W) injection syrg 12.5-25 g  12.5-25 g IntraVENous PRN    glucagon (GLUCAGEN) injection 1 mg  1 mg IntraMUSCular PRN    sodium chloride (NS) flush 5-10 mL  5-10 mL IntraVENous PRN    amLODIPine (NORVASC) tablet 10 mg  10 mg Oral DAILY    brimonidine (ALPHAGAN) 0.2 % ophthalmic solution 1 Drop  1 Drop Both Eyes BID    clopidogreL (PLAVIX) tablet 75 mg  75 mg Oral DAILY    levothyroxine (SYNTHROID) tablet 175 mcg  175 mcg Oral ACB    metoprolol succinate (TOPROL-XL) XL tablet 25 mg  25 mg Oral DAILY    sodium chloride (NS) flush 5-40 mL  5-40 mL IntraVENous Q8H    sodium chloride (NS) flush 5-40 mL  5-40 mL IntraVENous PRN    ondansetron (ZOFRAN ODT) tablet 4 mg  4 mg Oral Q8H PRN    Or    ondansetron (ZOFRAN) injection 4 mg  4 mg IntraVENous Q6H PRN    heparin (porcine) injection 5,000 Units  5,000 Units SubCUTAneous Q8H        Review of Systems  A comprehensive review of systems was negative.     Objective:     Patient Vitals for the past 24 hrs:   BP Temp Pulse Resp SpO2 Weight   10/24/20 0332 132/65 98.2 °F (36.8 °C) 73 19 100 % 148 lb 13 oz (67.5 kg)   10/23/20 2241 104/64 98 °F (36.7 °C) 65 16 100 %    10/23/20 1908 (!) 116/59 97.8 °F (36.6 °C) 77 18 100 %    10/23/20 1553 114/66 97.9 °F (36.6 °C) 72 20 100 %    10/23/20 1239 (!) 111/55 98.1 °F (36.7 °C) 68 23 100 %      No intake/output data recorded. 10/22 1901 - 10/24 0700  In: 1886.7 [P.O.:1080; I.V.:806.7]  Out: 2300 [Urine:2300]    Physical Exam:   Visit Vitals  /65   Pulse 73   Temp 98.2 °F (36.8 °C)   Resp 19   Wt 148 lb 13 oz (67.5 kg)   SpO2 100%   BMI 25.54 kg/m²     General appearance: alert, cooperative, no distress, appears stated age  Neck: supple, symmetrical, trachea midline, no adenopathy, thyroid: not enlarged, symmetric, no tenderness/mass/nodules, no carotid bruit and no JVD  Lungs: clear to auscultation bilaterally  Heart: regular rate and rhythm, S1, S2 normal, no murmur, click, rub or gallop  Abdomen: soft, non-tender. Bowel sounds normal. No masses,  no organomegaly  Extremities: extremities normal, atraumatic, no cyanosis or edema    Assessment:     Active Problems:    Vascular dementia without behavioral disturbance ([facility] [address] (10/20/2018)      ONEYDA (acute kidney injury) ([facility] [address] (9/16/2020)      Hyperkalemia (10/20/2020)      DKA, type 1 ([facility] [address] (10/20/2020)      Severe sepsis ([facility] [address] (10/20/2020)      Candidiasis (10/20/2020)        Plan:     1. Continue diabetic control. Usual vacillations occurring. Caloric intake appears to be reasonable. 2.  Hydration adequate. 3.  Discharge hopefully tomorrow.

## 2020-10-24 NOTE — PROGRESS NOTES
0730: Received report from Lonny Ambrosio, [facility]. Assumed care  1930: BG- 44 and 43. Patient is currently unable to swallow due to lethargy. PRN Dextrose given. HYPOGLYCEMIC EPISODE DOCUMENTATION    Patient with hypoglycemic episode(s) at 1930(time) on 10/24/2020(date). BG value(s) pre-treatment 40    Was patient symptomatic? [x] yes, [] no  Patient was treated with the following rescue medications/treatments: [x] D50                [] Glucose tablets                [] Glucagon                [] 4oz juice                [] 6oz reg soda                [] 8oz low fat milk  BG value post-treatment: 124  Once BG treated and value greater than 80mg/dl, pt was provided with the following:  [x] snack  [] meal  Name of MD notified:Paged Dr. Luis Fragoso at 0122 and reported to offgoing RN  The following orders were received: none      Problem: Falls - Risk of  Goal: *Absence of Falls  Description: Document Savage Dueñas Fall Risk and appropriate interventions in the flowsheet. Outcome: Progressing Towards Goal  Note: Fall Risk Interventions:  Mobility Interventions: Bed/chair exit alarm, Patient to call before getting OOB    Mentation Interventions: More frequent rounding, Room close to nurse's station, Toileting rounds, Update white board    Medication Interventions: Bed/chair exit alarm, Evaluate medications/consider consulting pharmacy, Patient to call before getting OOB, Teach patient to arise slowly    Elimination Interventions:  Toileting schedule/hourly rounds, Call light in reach

## 2020-10-25 VITALS
BODY MASS INDEX: 26.53 KG/M2 | DIASTOLIC BLOOD PRESSURE: 66 MMHG | OXYGEN SATURATION: 100 % | SYSTOLIC BLOOD PRESSURE: 142 MMHG | HEART RATE: 86 BPM | WEIGHT: 154.54 LBS | TEMPERATURE: 98.4 F | RESPIRATION RATE: 14 BRPM

## 2020-10-25 LAB
ANION GAP SERPL CALC-SCNC: 7 MMOL/L (ref 5–15)
BUN SERPL-MCNC: 10 MG/DL (ref 6–20)
BUN/CREAT SERPL: 16 (ref 12–20)
CALCIUM SERPL-MCNC: 8 MG/DL (ref 8.5–10.1)
CHLORIDE SERPL-SCNC: 108 MMOL/L (ref 97–108)
CO2 SERPL-SCNC: 22 MMOL/L (ref 21–32)
CREAT SERPL-MCNC: 0.61 MG/DL (ref 0.55–1.02)
GLUCOSE BLD STRIP.AUTO-MCNC: 146 MG/DL (ref 65–100)
GLUCOSE BLD STRIP.AUTO-MCNC: 211 MG/DL (ref 65–100)
GLUCOSE BLD STRIP.AUTO-MCNC: 263 MG/DL (ref 65–100)
GLUCOSE BLD STRIP.AUTO-MCNC: 82 MG/DL (ref 65–100)
GLUCOSE SERPL-MCNC: 41 MG/DL (ref 65–100)
POTASSIUM SERPL-SCNC: 4.3 MMOL/L (ref 3.5–5.1)
SERVICE CMNT-IMP: ABNORMAL
SERVICE CMNT-IMP: NORMAL
SODIUM SERPL-SCNC: 137 MMOL/L (ref 136–145)

## 2020-10-25 PROCEDURE — 74011250637 HC RX REV CODE- 250/637: Performed by: INTERNAL MEDICINE

## 2020-10-25 PROCEDURE — 74011636637 HC RX REV CODE- 636/637: Performed by: INTERNAL MEDICINE

## 2020-10-25 PROCEDURE — 3331090002 HH PPS REVENUE DEBIT

## 2020-10-25 PROCEDURE — 82962 GLUCOSE BLOOD TEST: CPT

## 2020-10-25 PROCEDURE — 3331090001 HH PPS REVENUE CREDIT

## 2020-10-25 PROCEDURE — 80048 BASIC METABOLIC PNL TOTAL CA: CPT

## 2020-10-25 PROCEDURE — 74011250636 HC RX REV CODE- 250/636: Performed by: INTERNAL MEDICINE

## 2020-10-25 PROCEDURE — 36415 COLL VENOUS BLD VENIPUNCTURE: CPT

## 2020-10-25 RX ORDER — INSULIN LISPRO 100 [IU]/ML
INJECTION, SOLUTION INTRAVENOUS; SUBCUTANEOUS
Qty: 2 ADJUSTABLE DOSE PRE-FILLED PEN SYRINGE | Refills: 11 | Status: ON HOLD | OUTPATIENT
Start: 2020-10-25 | End: 2020-11-09 | Stop reason: SDUPTHER

## 2020-10-25 RX ORDER — INSULIN GLARGINE 100 [IU]/ML
14 INJECTION, SOLUTION SUBCUTANEOUS DAILY
Status: DISCONTINUED | OUTPATIENT
Start: 2020-10-26 | End: 2020-10-25 | Stop reason: HOSPADM

## 2020-10-25 RX ORDER — INSULIN DEGLUDEC INJECTION 100 U/ML
12 INJECTION, SOLUTION SUBCUTANEOUS DAILY
Qty: 2 ADJUSTABLE DOSE PRE-FILLED PEN SYRINGE | Refills: 11 | Status: ON HOLD | OUTPATIENT
Start: 2020-10-25 | End: 2020-11-09 | Stop reason: SDUPTHER

## 2020-10-25 RX ADMIN — NYSTATIN: 100000 POWDER TOPICAL at 08:17

## 2020-10-25 RX ADMIN — Medication 10 ML: at 06:23

## 2020-10-25 RX ADMIN — INSULIN LISPRO 2 UNITS: 100 INJECTION, SOLUTION INTRAVENOUS; SUBCUTANEOUS at 16:32

## 2020-10-25 RX ADMIN — AMLODIPINE BESYLATE 10 MG: 5 TABLET ORAL at 08:14

## 2020-10-25 RX ADMIN — INSULIN GLARGINE 8 UNITS: 100 INJECTION, SOLUTION SUBCUTANEOUS at 08:13

## 2020-10-25 RX ADMIN — HEPARIN SODIUM 5000 UNITS: 5000 INJECTION INTRAVENOUS; SUBCUTANEOUS at 06:23

## 2020-10-25 RX ADMIN — BRIMONIDINE TARTRATE 1 DROP: 2 SOLUTION/ DROPS OPHTHALMIC at 08:17

## 2020-10-25 RX ADMIN — CLOPIDOGREL BISULFATE 75 MG: 75 TABLET ORAL at 08:16

## 2020-10-25 RX ADMIN — INSULIN LISPRO 2 UNITS: 100 INJECTION, SOLUTION INTRAVENOUS; SUBCUTANEOUS at 11:51

## 2020-10-25 RX ADMIN — Medication 1 AMPULE: at 08:16

## 2020-10-25 RX ADMIN — METOPROLOL SUCCINATE 25 MG: 50 TABLET, EXTENDED RELEASE ORAL at 08:16

## 2020-10-25 RX ADMIN — LEVOTHYROXINE SODIUM 175 MCG: 0.15 TABLET ORAL at 06:36

## 2020-10-25 RX ADMIN — HEPARIN SODIUM 5000 UNITS: 5000 INJECTION INTRAVENOUS; SUBCUTANEOUS at 15:14

## 2020-10-25 NOTE — PROGRESS NOTES
1900 Bedside shift change report given to Mathew Rivera (oncoming nurse) by Benjy Monroy (offgoing nurse). Report included the following information SBAR, Kardex, Intake/Output, MAR, Recent Results and Cardiac Rhythm NSR. Discussed last blood glucose of 62, benito RN to recheck blood glucose at this time    1930: blood glucose 44 and 43, dayshiadriana RN gave PRN D50 per protocol, will recheck, pt lethargic but responds to voice and oriented x4 at this time    1946: [facility] glucose now 123[facility] MD on call for Dr. Sandra Tompkins awaiting call back    2017: MD on call for DR. Sandra Tompkins called back at this time and informed of blood glucose, symptoms and treatment, no new orders at this time as Dr. Sandra Tompkins had placed new orders at 1915 2125: HS blood sugar 63, given 8 oz orange juice at this time will re-check in 15 minutes pt appears slightly lethargic but responds to voice and is oriented x4    2149: blood sugar 72, will give bedtime snack of crackers and ice cream and re-check blood glucose at 0000 as ordered

## 2020-10-25 NOTE — DISCHARGE INSTRUCTIONS
General Discharge Instructions    Patient ID:  Milan Sommers  [id number]  67 y.o.  1940    Patient Instructions          The following personal items were collected during your admission and were returned to you. Take Home Medications           What to do at Home    Recommended diet: Diabetic Diet with bedtime snack    Recommended activity: Activity as tolerated    Follow-up with Nati Hsieh MD  in 3 days. Information obtained by :  I understand that if any problems occur once I am at home I am to contact my physician. I understand and acknowledge receipt of the instructions indicated above.                                                                                                                                            Physician's or R.N.'s Signature                                                                  Date/Time                                                                                                                                              Patient or Representative Signature                                                          Date/Time

## 2020-10-25 NOTE — PROGRESS NOTES
Bedside and Verbal shift change report given to [place] (oncoming nurse) by Igor Romo (offgoing nurse). Report included the following information SBAR, Kardex, Intake/Output, MAR, Accordion, Recent Results and Cardiac Rhythm nsr.     0600-chemistry glucose critical low 40. Patient given juice, crackers. Fingerstick glucose currently at 40. Attending service notified by dayshift RN. Orders received.

## 2020-10-25 NOTE — PROGRESS NOTES
07:40  Assumed care of pt. Plan of care discussed. Call bell in reach. Will continue to monitor. Dr. Calvin Giron called via perfect serve. Discussed pt's BS, states to give 8u of long acting insulin instead of 16u.      09:00 . Plan of care discussed. Call bell in reach. Will continue to monitor. 11:00 Pt attempting to get out of bed and get ready to go home. Pt assisted back into bed. Bed alarm reactivated. Awaiting ride. 12:37  Plan of care discussed. Pt resting with lights dimmed. Call bell in reach. Will continue to monitor. 14:12  Spoke with pt's daughter Doren Apley, she is aware of discharge and will facilitate transportation. Pt resting with lights dimmed. Call bell in reach. Will continue to monitor. 15:18  Pt pulled up in bed and repositioned for comfort. Pt resting with lights dimmed. Call bell in reach. Will continue to monitor. 16:42  Pt incontinent of urine. Pt cleaned, new gown applied, and new diaper placed. Pt pulled up in bed for comfort. Pt resting with lights dimmed. Call bell in reach. Will continue to monitor. 18:52  Son called states he is at the main entrance to  pt. Son would prefer discharge instructions at the car. 18:32  Pt sitting up in chair awaiting ride. Called daughter to check on status of ride home states brother is on the way. 17:21 Called Doren Apley pt's daughter about status of ride, states son is on the way.

## 2020-10-25 NOTE — PROGRESS NOTES
ALEKSANDAR:  1) [facility] SPENSER for PT/OT/SN/SW  2) Follow-up Care Appointments (PCP)  3) 2nd IM Letter  4) [facility]  5) Family to transport home    Pt to discharge home today by private vehicle with family. Confirmed with [facility] Weekend Liaison ([phone]). SPENSER orders already written. Pt/family to call and schedule PCP f/u appointment, as offices are closed at this time. Information also provided for [facility] services as needed. Verbal consent provided by family for 2nd IM Letter. All information entered into pt AVS.     Pt has no additional CM needs at this time. Care Management Interventions  PCP Verified by CM: Yes  Palliative Care Criteria Met (RRAT>21 & CHF Dx)?: Yes  Palliative Consult Recommended?: Yes  Mode of Transport at Discharge: Other (see comment)(Daughter)  Transition of Care Consult (CM Consult): [address]: Yes  Discharge Durable Medical Equipment: No(No DME at home )  Health Maintenance Reviewed: Yes  Physical Therapy Consult: No  Occupational Therapy Consult: No  Speech Therapy Consult: No  Current Support Network: Lives with Caregiver, Relative's Home(Pt lives with 2 sons. Also has another son and daughter.  Family works to support pt's needs)  Confirm Follow Up Transport: Family(Daughter)  The Plan for Transition of Care is Related to the Following Treatment Goals : HH PT/OT/SN/SW Resumption of Care, Follow-up Appointments  The Patient and/or Patient Representative was Provided with a Choice of Provider and Agrees with the Discharge Plan?: Yes  Name of the Patient Representative Who was Provided with a Choice of Provider and Agrees with the Discharge Plan: Deana (pt)  Freedom of Choice List was Provided with Basic Dialogue that Supports the Patient's Individualized Plan of Care/Goals, Treatment Preferences and Shares the Quality Data Associated with the Providers?: Yes  Discharge Location  Discharge Placement: Home with home health([facility])    Yobani Hawkins, [facility] Care Manager  [phone]

## 2020-10-26 ENCOUNTER — PATIENT OUTREACH (OUTPATIENT)
Dept: CASE MANAGEMENT | Age: 80
End: 2020-10-26

## 2020-10-26 ENCOUNTER — HOME CARE VISIT (OUTPATIENT)
Dept: SCHEDULING | Facility: HOME HEALTH | Age: 80
End: 2020-10-26
Payer: MEDICARE

## 2020-10-26 VITALS
BODY MASS INDEX: 22.86 KG/M2 | SYSTOLIC BLOOD PRESSURE: 174 MMHG | WEIGHT: 129 LBS | OXYGEN SATURATION: 98 % | RESPIRATION RATE: 18 BRPM | HEIGHT: 63 IN | DIASTOLIC BLOOD PRESSURE: 82 MMHG | HEART RATE: 82 BPM | TEMPERATURE: 99 F

## 2020-10-26 PROCEDURE — G0299 HHS/HOSPICE OF RN EA 15 MIN: HCPCS

## 2020-10-26 PROCEDURE — 3331090002 HH PPS REVENUE DEBIT

## 2020-10-26 PROCEDURE — 3331090001 HH PPS REVENUE CREDIT

## 2020-10-26 NOTE — DISCHARGE SUMMARY
[address] [place] [address] SUMMARY    Name:  Oksana Salazar  MR#:  [id number]  :  1940  ACCOUNT #:  [de-identified]  ADMIT DATE:  10/20/2020  DISCHARGE DATE:  10/25/2020      HISTORY OF PRESENT ILLNESS:  The patient is a 52-year-old lady who was doing well until presumably the last 24-48 hours when she became increasingly confused and weak. She presented to the emergency room, her blood sugars were markedly elevated. Further workup indicated that she had DKA and she was subsequently admitted. The patient has had frequent admissions for this exclusively related to her progressive dementia and lack of social support at home, so diabetic recommendations made are not followed. Past medical history, social history, review of systems, family history, physical examination are as in the admitting H and P.    LABORATORY VALUES:  Admission abnormalities in the comprehensive profile limit to potassium of 6.0, CO2 less than 5, blood sugar 838, BUN and creatinine 29 and 1.80 with albumin of 2.9. Alk phos of 205. Initial lactic acid was 5.2, repeat 5.6. At the time of discharge her BUN and creatinine were 10 and 0.60 with a CO2 of 22. Hemoglobin was 11.0, white count was 30.7 with 88 segs, 1 bands, 10 lymphocytes, 4 monocytes and 1 basophil. On 10/22, white count was 18.2. All cultures were negative. Urinalysis revealed no evidence of an urinary tract infection, with 0-4 wbc's per high powered field. Chest x-ray negative. HOSPITAL COURSE:  The patient was admitted and was placed on an insulin drip with directions from the [address] [place] [address]. Needless to say, blood sugars improved significantly and there was progressive increase in her ketoacidosis to the point where her CO2s were greater than 20. She was subsequently transferred off of the insulin drip to basal bolus insulin. The intensity of the transition was somewhat reduced because of hypoglycemia.   In any event, this improved and at the time of discharge, the patient's blood sugars were quite low as it is oftentimes the case. Her mental status returned to baseline fairly rapidly. Additionally, although her white count was elevated, a bit more than usual, no obvious secondary infections were found. Antibiotics were started empirically appropriately and these were subsequently discontinued after all cultures were negative. She never had a fever or any suggestive symptoms of inflammatory response. Unfortunately her discharge insulin dose will be somewhat reduced because of the fear of hypoglycemia in this elderly lady who is demented without a meaningful social support. FINAL DIAGNOSES:  1. Diabetic ketoacidosis. 2.  Leukocytosis of undetermined etiology. No evidence of overt infection. 3.  Progressive dementia. 4.  Primary hypertension. 5.  Dyslipidemia. 6.  Hypothyroidism, compensated. 7.  Prerenal azotemia secondary to diabetic ketoacidosis. DISPOSITION:  The patient will be discharged home ambulatory on an ADA diet with bedtime snack. DISCHARGE MEDICATIONS:  Include the following, degludec 12 units q.a.m., Humalog insulin 2 units a.c. breakfast and lunch only, pravastatin 80 mg at bedtime, amlodipine 10 mg daily, Alphagan 0.15% one drop OU b.i.d., clopidogrel 75 mg daily, Levoxyl 0.175 mg daily, metoprolol succinate 25 mg daily. Unfortunately, transitioning the patient home is extremely difficulty. She is oftentimes low during the hospital stay primarily because everything that is supposed to be done is being done and as a result her requirements obviously are not that high. This changes at home because they have no idea what her blood sugars are doing, so insulin cannot be adjusted accordingly and oftentimes, she develops progressive hyperglycemia with no reaction by the family to the elevated blood sugars until a crisis develops leading to admission.   Efforts to rectify the home situation have been unsuccessful.       MD KENNETH [email or web address]  D:  10/25/2020 10:09  T:  10/26/2020 0:48  JOB #:  [id number]

## 2020-10-26 NOTE — Clinical Note
Crystal, patient was in hospital again for DKA. Can you check with daughter to offer help with resources?   Thanks,  Nish Wilhelm

## 2020-10-26 NOTE — PROGRESS NOTES
Patient was admitted to [facility] on 10/20 and discharged on 10/25/20 for DKA. Outreach made within 2 business days of discharge: Yes    Top Discharge Challenges to be reviewed by the provider   Additional needs identified to be addressed with provider yes  Frequent admissions for DKA. [address] nurse saw her 10/26- unable to find glucometer and none of meds available. Insulin pens not in refrig. Oven door open for heat. [facility] nurse contacted APS for concerns. Safety concerns. Daughter says Medicaid personal aide approved. Trying to set up Meet and greet so can get started. Daughter overwhelmed with loss of her own  2 weeks ago from Covid. Brother helping (doesn't live with mother). He was going to find all of her meds and contact pcp for ALEKSANDAR visit. Urged daughter to consider that mother may need higher level of care- she does not want to move her to SNF/LTC. Discussed COVID-19 related testing which was not done at this time. Test results were not done. Method of communication with provider : chart routing       Advance Care Planning:   Does patient have an Advance Directive:  currently not on file; patient declined education    Inpatient Readmission Risk score: 58%  Was this a readmission? Yes [facility] 9/16-9/21)   Patient stated reason for the admission: per daughter, confused and weak  Patients top risk factors for readmission: functional cognitive ability, functional physical ability, ineffective coping, lack of knowledge about disease, medical condition, medication management, support system and utilization of services  Interventions to address risk factors: reviewed discharge instructions with daughter, reviewed meds, reviewed red flags. Encouraged her to set up Meet and Greet so aide can meet with her mother and get started. Reminded to f/u with pcp per recommendation. Given [facility] info and CTN contact info.  Madhu Avila, notified patient out of hospital and daughter may need additional resources. Care Transition Nurse (CTN) contacted the family by telephone to perform post hospital discharge assessment. Verified name and  with family as identifiers. Provided introduction to self, and explanation of the CTN role. Daughter reports mother seemed like she was doing well yesterday. Expecting PT to come today. Has been approved to have personal aide through Medicaid, has to set up a Meet and Greet. Asked her about the meds missing, daughter says she has asked her other brother to find the medications and put in central spot. Brother has called Eitan Vaca for appt for ALEKSANDAR for her. Daughter admits she is overwhelmed, having just lost her  to Covid about 2 weeks ago. Support given, gave her CTN contact info if any quetsions/concerns. Asked her if the family had considered that her mother may need higher level of care, may need to move to a LTC/SNF- daughter says they did not want to move her. Feels they are able to manage her at home. Encouraged to consider this option and discuss with pcp. CTN reviewed discharge instructions, medical action plan and red flags with family who verbalized understanding. Family given an opportunity to ask questions and does not have any further questions or concerns at this time. The family agrees to contact the PCP office for questions related to their healthcare. Medication reconciliation was performed with family, who verbalizes understanding of administration of home medications. Advised obtaining a 90-day supply of all daily and as-needed medications.    Referral to Pharm D needed: no     Home Health/Outpatient orders at discharge: home health care, PT, OT and [address]: [address] [place] [address]  Date of initial visit: 10/26/20    Durable Medical Equipment ordered at discharge: none  [address] received: na    Covid Risk Education    Patient has following risk factors of: diabetes. Education provided regarding infection prevention, and signs and symptoms of COVID-19 and when to seek medical attention with family who verbalized understanding. Discussed exposure protocols and quarantine From CDC: Are you at higher risk for severe illness?  and given an opportunity for questions and concerns. The family agrees to contact the COVID-19 hotline [phone] or PCP office for questions related to COVID-19. For more information on steps you can take to protect yourself, see CDC's How to Protect Yourself     Patient/family/caregiver given information for [employer] and agrees to enroll no  Patient's preferred e-mail: declines  Patient's preferred phone number: declines    Discussed follow-up appointments. If no appointment was previously scheduled, appointment scheduling offered: yes-brother scheduling appt with pcp per daughter. Desirae Subramanian Dr follow up appointment(s):   Future Appointments   Date Time Provider Department Center   10/28/2020 [facility]   10/30/2020 To Be Determined Jennifer Prather [address] [place] [address] [employer]   11/2/2020 12:30 PM Jhoana Mathew MD [facility] MAIN BS [facility]   11/2/2020 To Be Determined Michelle Haileo [place] [employer]   11/4/2020 To Be Determined Jennifer Prather [facility] follow up appointment(s): na  Plan for follow-up call in 5-7 days based on severity of symptoms and risk factors. CTN provided contact information for future needs.     Goals Addressed                 This Visit's Progress     Supportive resources: medicaid assistance and options for caregiver support          10/14/2020   Follow up  SW received a referral from CTN requesting a follow up call with patient's daughter to assist her with signing patient up for Meals on Wheels program.  CTN contacted [facility] enrolling patient into their home delivered meals program (10 meals) following her recent discharge from hospital.  YOLANDA contacted daughter Daryn Keenan for follow up. Plan  YOLANDA completed the Meals on Wheels application online with daughter's assistance. Advise daughter to contact [employer] after 2 weeks if she has not received approval. Per Daryn Keenan, she is expecting to receive notification from [facility] regarding pt's status for LTC/personal care Medicaid coverage. SW explained to daughter the process for selecting  care agencies and advise her to follow up for assistance if needed. Daughter understood and voiced no additional questions or concerns during the call. [address] [facility] Transitions Team   [facility] Ambulatory Care Coordination Team  [phone]     7/6/20  SW received referral from CTN requesting a follow up call with patient's daughter to offer assistance/support with establishing personal care and assistance for patient. Per CTN, a UAI screening was completed while pt was hospitalized and daughter was working with [facility] program to establish day program/Medicaid. SW left voice mails requesting a call back. YOLANDA will follow up once call is returned  [address] [facility] Ambulatory Care Coordination Team  [phone]      2/26/2020   CT SW received a call from daughter discussing pt's plan. Daughter stated that she is having difficulty with getting in contact [place] to initiate UAI screening for personal care. SW reviewed number that was provided and encouraged her to make another outreach attempt. YOLANDA also provided her with pt's [employer] CM contact information and explained the purpose/benefits for CM services through insurance provider. YOLANDA emailed her a blank ABIEL form to complete and fax to [facility] giving her permission to communicate on pt's behalf. She reported that pt is doing well and she is planning to assist her to PCP f/u appointment tomorrow.   YOLANDA will follow up with daughter at a later date.    [address] Team   [address] [place] [address] Ambulatory Care Coordination Team  [phone]    2/25/20  CT YOLANDA left a message with patient's daughter requesting a call back. SW will provide daughter with pt's [employer]  name and number and discuss the process of submitting an authorization release to [facility] [employer] on behalf of the patient for caregiver purposes. 2/21/20  CT YOLANDA contacted patient's daughter and advised her to contact Liseth TONY to initiate Uniform Assessment Instrument screening for personal care/LTC assistance. Daughter reported that she submitted a medicaid application. Daughter in agreement with receiving assistance from PharmD for Diabetes education/tx options for patient. YOLANDA will update Hiren Beltran PharmD of this request.  Bridger Juliana will follow up at a later date to inquire about progress and offer assistance if needed. [facility]  [phone]    2/20/20  CT YOLANDA was notified of patient's admission to [facility]. YOLANDA left a message with pt's  Rosetta Be requesting a uniform assessment screening for personal care/LTC services. SW will attempt contact at a later date if call is not returned. Daughter was also made aware of the UAI request.      2/18/20  YOLANDA discussed with daughter about the purpose and benefit of receiving Diabetes education from [facility] S[facility]. Daughter stated that she understands how to manage pt's medical condition, but the challenge is ensuring that pt eat her meals and take medications consistently. Daughter stated that she calls her mom several times during the day to remind her to take meds, eat meals etc.  SW discussed the option of patient attending an adult  program during the week to assist with needs until her personal care is established.  Daughter stated that pt would not be interested in attending but she will share this with pt as an option. YOLANDA contacted  DSS to iniate the UAI screening for personal care. SW advised daughter to contact Mrs. Derik Chamorro at [facility] [phone] to provide additional information for screening. [place] [address] Ambulatory Care Coordination Team  [phone]    2/17/20  YOLANDA contacted daughter for f/u. Daughter reported that she/pt submitted the medicaid application. SW explained to daughter the f/u process and services offered though medicaid. SW/daughter also discussed the benefit of having pt's medications bubbled pack and advised her to contact pharmacy to inquire about the process. SW will f/u with Ramirez Sal[address] RD to coordinate a meeting for pt/daughter to receive diabetes education. SW will f/u at a later date. [place] [address] Ambulatory Care Coordination Team  [phone]  1/31/2020   YOLANDA attempted to contacted patient's daughter to follow up on progress made with completing medicaid application and offer assistance. YOLANDA left a voicemail requesting a call back. [place] [address] Ambulatory Care Coordination Team  [phone]    1/15/2020   CTN YOLANDA received a call back from [facility] providing an update on patients home visit. YOLANDA met with patient in the home and spoke with the daughter via phone during the visit. Karen Blum reported there were no concerns observed during the visit. Karen Blum reported that patient was alert, verbal and oriented during the visit, as she appropriately shared information and answered questions. However, It is noted that [facility] created goals and interventions to address patients cognitive impairment.       Karen Blum discussed with daughter the importance of having Medicaid coverage in place, as it could provide personal care aides to assist patient in the home. YOLANDA explained to daughter the concerns that were shared regarding patients care in the home and offered to schedule a date and time to meet with her to complete a Medicaid application and submit a request for UAI screening, but daughter was not able to confirm a date. ALMA DELIA GUERRERO will follow up with daughter at a later date to schedule meeting. See Baljeet GUERRERO note. [place] [address] Ambulatory Care Coordination Team  [phone]    1/10/2020 3:00pm  MARLEN GUERRERO received a call back from Diogo Dominguez. MARLEN GUERRERO updated her on the concerns related to patient's care and safety in the home. 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 According to patients daughter, patient owns her home and the son lives with her. Patients daughter is involved with her care but does not live with her. ALMA DELIA GUERRERO made aware of the resources that were provided to daughter regarding the Medicaid process and discussed the possibility of patient participating in [facility] chronic care program for disease management. ALMA DELIA GUERRERO is scheduled to conduct a home visit with patient next week. Due to patients cognition limitations, YOLANDA suggested that [place] SW contact patients son/daughter to schedule a home visit. MARLEN GUERRERO mentioned, pending the outcome of home assessment there may be a need for an APS referral. ALMA DELIA GUERRERO acknowledged the report given and will follow up with MARLEN GUERRERO at a later date to discuss findings. [place] [address] Ambulatory Care Coordination Team  [phone]    1/9/2020 2:30am  [address] [facility], reported that patient is scheduled to receive a home visit from Panfilo Sauer on 1-13-20.   MARLEN GUERRERO called home health Diogo GUERRERO requesting a call back to discuss concerns reg patient's care/safety and address social needs. SW will attempt call at a later date if call is not returned. [place]  Ambulatory Care Coordination Team  [phone]    1/7/2020  1:00pm  It is noted that patient was recently discharged from the hospital due to DKA. SW contacted patient's daughter, Glenn Roach, to discuss resources to address patient's needs. Daughter stated that she was busy, but was receptive to taking a few minutes to discuss options related to patient's care. SW explained to daughter the Medicaid process along with the benefits of Medicaid coverage. SW highlighted a few Medicaid services that patient could receive if approved, such as personal care, adult  coverage, assistance with medication cost and alternative placement if needed. Daughter acknowledged the services and stated that her goal is to care for patient in the home. Patient's son live with her and daughter assist with providing care. SW advised daughter to contact [address] [place] [address] to initiate the application process, as it could take up to 45 days for Medicaid approval.  Daughter understood and stated she and patient will complete the application by the end of the week. Daughter denied any additional resources at this time. SW provided daughter my contact information to contact if needed      SW will contact daughter within the next two weeks to assess progress made with completing application, discuss ACP items and offer assistance if needed. [facility] Transitions Team   [facility] Ambulatory Care Coordination Team  [phone]     12/17/2019  1:30pm  SW attempted to contact patient's daughter for follow up. SW left a detailed voicemail requesting a call back, and suggested that she leave on my voicemail best time/date to reach her.   SW will follow up at a later date.      12/13/2019  1:25pm  Patient's daughter requested assistance with understanding medicaid process and initiating application. SW contacted daughter however, she reported that she was unable to talk and stated she would contact SW [phone] at 1:30pm.      Missy Porter will attempt contact on 12-16-19 if call is not returned. SW will offer assistance with medicaid process and additional resources if needed. [address] [facility] Transitions Team     10/27/20 [facility] 10/20-10/25 DKCELINE. Spoke with daughter today, thinks mother is doing well. Advised her that per [address] in yesterday's visit, meds could not be found, unable to locate glucometer and oven door was open for heat. [facility] notified [facility] of concerns. Daughter says brother to find her meds. Trying to set up Meet and Greet for aide to meet mom so she can start with personal care 5 x week. Suggested she consider higher level of care for mother, daughter not receptive. CTN to notify Suad Willson, of patient's return to home to see if she can assist with any additional resources. mbt

## 2020-10-27 ENCOUNTER — HOME CARE VISIT (OUTPATIENT)
Dept: SCHEDULING | Facility: HOME HEALTH | Age: 80
End: 2020-10-27
Payer: MEDICARE

## 2020-10-27 VITALS
OXYGEN SATURATION: 98 % | DIASTOLIC BLOOD PRESSURE: 76 MMHG | TEMPERATURE: 98.6 F | RESPIRATION RATE: 18 BRPM | HEART RATE: 78 BPM | SYSTOLIC BLOOD PRESSURE: 130 MMHG

## 2020-10-27 PROCEDURE — G0151 HHCP-SERV OF PT,EA 15 MIN: HCPCS

## 2020-10-27 PROCEDURE — 3331090002 HH PPS REVENUE DEBIT

## 2020-10-27 PROCEDURE — 3331090001 HH PPS REVENUE CREDIT

## 2020-10-28 ENCOUNTER — HOME CARE VISIT (OUTPATIENT)
Dept: SCHEDULING | Facility: HOME HEALTH | Age: 80
End: 2020-10-28
Payer: MEDICARE

## 2020-10-28 ENCOUNTER — PATIENT OUTREACH (OUTPATIENT)
Dept: CASE MANAGEMENT | Age: 80
End: 2020-10-28

## 2020-10-28 PROCEDURE — 3331090001 HH PPS REVENUE CREDIT

## 2020-10-28 PROCEDURE — 3331090002 HH PPS REVENUE DEBIT

## 2020-10-29 ENCOUNTER — HOSPITAL ENCOUNTER (EMERGENCY)
Age: 80
Discharge: HOME | DRG: 871 | End: 2020-10-30
Attending: EMERGENCY MEDICINE | Admitting: EMERGENCY MEDICINE
Payer: MEDICARE

## 2020-10-29 DIAGNOSIS — D72.819 LEUKOPENIA, UNSPECIFIED TYPE: ICD-10-CM

## 2020-10-29 DIAGNOSIS — R73.9 HYPERGLYCEMIA: Primary | ICD-10-CM

## 2020-10-29 LAB
ALBUMIN SERPL-MCNC: 2.9 G/DL (ref 3.5–5)
ALBUMIN/GLOB SERPL: 0.8 {RATIO} (ref 1.1–2.2)
ALP SERPL-CCNC: 241 U/L (ref 45–117)
ALT SERPL-CCNC: 156 U/L (ref 12–78)
ANION GAP SERPL CALC-SCNC: 9 MMOL/L (ref 5–15)
AST SERPL-CCNC: 243 U/L (ref 15–37)
BASOPHILS # BLD: 0 K/UL (ref 0–0.1)
BASOPHILS NFR BLD: 0 % (ref 0–1)
BILIRUB SERPL-MCNC: 0.5 MG/DL (ref 0.2–1)
BUN SERPL-MCNC: 15 MG/DL (ref 6–20)
BUN/CREAT SERPL: 14 (ref 12–20)
CALCIUM SERPL-MCNC: 8.7 MG/DL (ref 8.5–10.1)
CHLORIDE SERPL-SCNC: 97 MMOL/L (ref 97–108)
CO2 SERPL-SCNC: 26 MMOL/L (ref 21–32)
CREAT SERPL-MCNC: 1.05 MG/DL (ref 0.55–1.02)
DIFFERENTIAL METHOD BLD: ABNORMAL
EOSINOPHIL # BLD: 0 K/UL (ref 0–0.4)
EOSINOPHIL NFR BLD: 1 % (ref 0–7)
ERYTHROCYTE [DISTWIDTH] IN BLOOD BY AUTOMATED COUNT: 16.2 % (ref 11.5–14.5)
GLOBULIN SER CALC-MCNC: 3.6 G/DL (ref 2–4)
GLUCOSE BLD STRIP.AUTO-MCNC: 457 MG/DL (ref 65–100)
GLUCOSE BLD STRIP.AUTO-MCNC: >600 MG/DL (ref 65–100)
GLUCOSE SERPL-MCNC: 648 MG/DL (ref 65–100)
HCT VFR BLD AUTO: 33 % (ref 35–47)
HGB BLD-MCNC: 11.1 G/DL (ref 11.5–16)
IMM GRANULOCYTES # BLD AUTO: 0.1 K/UL (ref 0–0.04)
IMM GRANULOCYTES NFR BLD AUTO: 3 % (ref 0–0.5)
LYMPHOCYTES # BLD: 0.6 K/UL (ref 0.8–3.5)
LYMPHOCYTES NFR BLD: 22 % (ref 12–49)
MCH RBC QN AUTO: 30.8 PG (ref 26–34)
MCHC RBC AUTO-ENTMCNC: 33.6 G/DL (ref 30–36.5)
MCV RBC AUTO: 91.7 FL (ref 80–99)
MONOCYTES # BLD: 0.6 K/UL (ref 0–1)
MONOCYTES NFR BLD: 20 % (ref 5–13)
NEUTS SEG # BLD: 1.5 K/UL (ref 1.8–8)
NEUTS SEG NFR BLD: 54 % (ref 32–75)
NRBC # BLD: 0 K/UL (ref 0–0.01)
NRBC BLD-RTO: 0 PER 100 WBC
PLATELET # BLD AUTO: 273 K/UL (ref 150–400)
PMV BLD AUTO: 10.5 FL (ref 8.9–12.9)
POTASSIUM SERPL-SCNC: 4.1 MMOL/L (ref 3.5–5.1)
PROT SERPL-MCNC: 6.5 G/DL (ref 6.4–8.2)
RBC # BLD AUTO: 3.6 M/UL (ref 3.8–5.2)
RBC MORPH BLD: ABNORMAL
SERVICE CMNT-IMP: ABNORMAL
SERVICE CMNT-IMP: ABNORMAL
SODIUM SERPL-SCNC: 132 MMOL/L (ref 136–145)
WBC # BLD AUTO: 2.8 K/UL (ref 3.6–11)

## 2020-10-29 PROCEDURE — 99285 EMERGENCY DEPT VISIT HI MDM: CPT

## 2020-10-29 PROCEDURE — 80053 COMPREHEN METABOLIC PANEL: CPT

## 2020-10-29 PROCEDURE — 85025 COMPLETE CBC W/AUTO DIFF WBC: CPT

## 2020-10-29 PROCEDURE — 96361 HYDRATE IV INFUSION ADD-ON: CPT

## 2020-10-29 PROCEDURE — 3331090002 HH PPS REVENUE DEBIT

## 2020-10-29 PROCEDURE — 74011250636 HC RX REV CODE- 250/636: Performed by: EMERGENCY MEDICINE

## 2020-10-29 PROCEDURE — 36415 COLL VENOUS BLD VENIPUNCTURE: CPT

## 2020-10-29 PROCEDURE — 2709999900 HC NON-CHARGEABLE SUPPLY

## 2020-10-29 PROCEDURE — 77030038269 HC DRN EXT URIN PURWCK BARD -A

## 2020-10-29 PROCEDURE — 82962 GLUCOSE BLOOD TEST: CPT

## 2020-10-29 PROCEDURE — 81001 URINALYSIS AUTO W/SCOPE: CPT

## 2020-10-29 PROCEDURE — 96374 THER/PROPH/DIAG INJ IV PUSH: CPT

## 2020-10-29 PROCEDURE — 74011636637 HC RX REV CODE- 636/637: Performed by: EMERGENCY MEDICINE

## 2020-10-29 PROCEDURE — 96376 TX/PRO/DX INJ SAME DRUG ADON: CPT

## 2020-10-29 PROCEDURE — 3331090001 HH PPS REVENUE CREDIT

## 2020-10-29 RX ADMIN — HUMAN INSULIN 10 UNITS: 100 INJECTION, SOLUTION SUBCUTANEOUS at 22:15

## 2020-10-29 RX ADMIN — SODIUM CHLORIDE 1000 ML: 900 INJECTION, SOLUTION INTRAVENOUS at 22:17

## 2020-10-29 NOTE — PROGRESS NOTES
Goals Addressed                 This Visit's Progress     Supportive resources: medicaid assistance and options for caregiver support          10/29/2020   Follow up   SW contacted daughter to inquire about status of patient's LTC Medicaid coverage. Daughter stated that patient was approved for LTC Medicaid and she was given a copy of Uniform Assessment Instrument for personal care. Daughter reported that she is having difficulty with identifying a personal care agency that can provide services for pt due to various reasons. Daughter provided SW with a list of agencies that she has contacted. Patient was approved for 40hrs a week for personal care. Plan  SW contacted [facility] [phone] and they are willing to accept the case pending the availability for aides that could render the service in her area. Home Health representative will contact daughter for additional follow up. YOLANDA also contacted Tammy to speak with patient's Complex Care Coordinator. SW left a voicemail with ARCELIA Arias [phone] requesting a call back for assistance with care coordination. YOLANDA shared this update with patient's daughter and advised her to follow up with CC next week. SW will follow up at a later date. Christine Lira, [address] [facility] Transitions Team   [facility] Ambulatory Care Coordination Team  [phone]         10/14/2020   Follow up  SW received a referral from CTN requesting a follow up call with patient's daughter to assist her with signing patient up for Meals on Wheels program.  CTN contacted [facility] enrolling patient into their home delivered meals program (10 meals) following her recent discharge from hospital.  YOLANDA contacted daughter Nia Meza for follow up. Plan  YOLANDA completed the Meals on Wheels application online with daughter's assistance.   Advise daughter to contact [employer] after 2 weeks if she has not received approval. Per Snow Duong, she is expecting to receive notification from [address] [facility] regarding pt's status for LTC/personal care Medicaid coverage. YOLANDA explained to daughter the process for selecting  care agencies and advise her to follow up for assistance if needed. Daughter understood and voiced no additional questions or concerns during the call. Fransisco Kimball, [address] [place] [address] Transitions Team   [facility] Ambulatory Care Coordination Team  [phone]     7/6/20  SW received referral from CTN requesting a follow up call with patient's daughter to offer assistance/support with establishing personal care and assistance for patient. Per CTN, a UAI screening was completed while pt was hospitalized and daughter was working with [facility] program to establish day program/Medicaid. YOLANDA left voice mails requesting a call back. YOLANDA will follow up once call is returned  Fransisco Kimball [address] [place] [address] Ambulatory Care Coordination Team  [phone]      2/26/2020   CT SW received a call from daughter discussing pt's plan. Daughter stated that she is having difficulty with getting in contact with [facility] to initiate UAI screening for personal care. SW reviewed number that was provided and encouraged her to make another outreach attempt. YOLANDA also provided her with pt's [employer] CM contact information and explained the purpose/benefits for CM services through insurance provider. YOLANDA emailed her a blank ABIEL form to complete and fax to [facility] [employer] giving her permission to communicate on pt's behalf. She reported that pt is doing well and she is planning to assist her to PCP f/u appointment tomorrow. YOLANDA will follow up with daughter at a later date. Lew Sheehan Ambulatory Care Coordination Team  [phone]    2/25/20  CT YOLANDA left a message with patient's daughter requesting a call back.   YOLANDA will provide daughter with pt's [employer]  name and number and discuss the process of submitting an authorization release to [facility] on behalf of the patient for caregiver purposes. 2/21/20  CT YOLANDA contacted patient's daughter and advised her to contact Liseth TONY to initiate Uniform Assessment Instrument screening for personal care/LTC assistance. Daughter reported that she submitted a medicaid application. Daughter in agreement with receiving assistance from PharmD for Diabetes education/tx options for patient. YLOANDA will update Andy Hodgkins PharmD of this request.  Fernanda Garvey will follow up at a later date to inquire about progress and offer assistance if needed. [place] [address] Ambulatory Care Coordination Team  [phone]    2/20/20  CT YOLANDA was notified of patient's admission to [facility]. SW left a message with pt's  Franky Landers requesting a uniform assessment screening for personal care/LTC services. YOLANDA will attempt contact at a later date if call is not returned. Daughter was also made aware of the UAI request.      2/18/20  YOLANDA discussed with daughter about the purpose and benefit of receiving Diabetes education from [employer] Avinash Mohsenfito. Daughter stated that she understands how to manage pt's medical condition, but the challenge is ensuring that pt eat her meals and take medications consistently. Daughter stated that she calls her mom several times during the day to remind her to take meds, eat meals etc.  SW discussed the option of patient attending an adult  program during the week to assist with needs until her personal care is established. Daughter stated that pt would not be interested in attending but she will share this with pt as an option. YOLANDA contacted  [facility] to iniate the UAI screening for personal care. SW advised daughter to contact Mrs. Vidya Patricio at [facility] [phone] to provide additional information for screening. [place] [address] Ambulatory Care Coordination Team  [phone]    2/17/20  SW contacted daughter for f/u. Daughter reported that she/pt submitted the medicaid application. SW explained to daughter the f/u process and services offered though medicaid. SW/daughter also discussed the benefit of having pt's medications bubbled pack and advised her to contact pharmacy to inquire about the process. SW will f/u with Richard Magallon RD to coordinate a meeting for pt/daughter to receive diabetes education. SW will f/u at a later date. [place] [address] Ambulatory Care Coordination Team  [phone]  1/31/2020   YOLANDA attempted to contacted patient's daughter to follow up on progress made with completing medicaid application and offer assistance. SW left a voicemail requesting a call back. [place] [address] Ambulatory Care Coordination Team  [phone]    1/15/2020   CTN YOLANDA [facility] on [facility]s home visit. YOLANDA met with patient in the home and spoke with the daughter via phone during the visit. Rajinder Weir reported there were no concerns observed during the visit. Rajinder Weir reported that patient was alert, verbal and oriented during the visit, as she appropriately shared information and answered questions. However, It is noted that [facility] SN created goals and interventions to address patients cognitive impairment. Rajinder Weir discussed with daughter the importance of having Medicaid coverage in place, as it could provide personal care aides to assist patient in the home.  YOLANDA explained to daughter the concerns that were shared regarding patients care in the home and offered to schedule a date and time to meet with her to complete a Medicaid application and submit a request for UAI screening, but daughter was not able to confirm a date. ALMA DELIA GUERRERO will follow up with daughter at a later date to schedule meeting. See [facility] SW note. [place] [address] Ambulatory Care Coordination Team  [phone]    1/10/2020 3:00pm  MARLEN GUERRERO received a call back from [facility] Alethea GUERRERO. CTN YOLANDA updated her on the concerns related to patient's care and safety in the home. 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 According to patients daughter, patient owns her home and the son lives with her. Patients daughter is involved with her care but does not live with her. ALMA DELIA GUERRERO made aware of the resources that were provided to daughter regarding the Medicaid process and discussed the possibility of patient participating in [facility] chronic care program for disease management. ALMA DELIA GUERRERO is scheduled to conduct a home visit with patient next week. Due to patients cognition limitations, SW suggested that [facility] SW contact patients son/daughter to schedule a home visit. MARLEN GUERRERO mentioned, pending the outcome of home assessment there may be a need for an APS referral. ALMA DELIA GUERRERO acknowledged the report given and will follow up with MARLEN GUERRERO at a later date to discuss findings. [place] [address] Ambulatory Care Coordination Team  [phone]    1/9/2020 2:30am  [address], reported that patient is scheduled to receive a home visit from [address] [place] [address] on 1-13-20. MARLEN GUERRERO called home health Alethea GUERRERO requesting a call back to discuss concerns reg patient's care/safety and address social needs. SW will attempt call at a later date if call is not returned.     Tiffanie Batista [facility] Team  [phone]    1/7/2020  1:00pm  It is noted that patient was recently discharged from the hospital due to DKA. SW contacted patient's daughter, Varghese Padgett, to discuss resources to address patient's needs. Daughter stated that she was busy, but was receptive to taking a few minutes to discuss options related to patient's care. SW explained to daughter the Medicaid process along with the benefits of Medicaid coverage. SW highlighted a few Medicaid services that patient could receive if approved, such as personal care, adult  coverage, assistance with medication cost and alternative placement if needed. Daughter acknowledged the services and stated that her goal is to care for patient in the home. Patient's son live with her and daughter assist with providing care. SW advised daughter to contact [address] [place] [address] to initiate the application process, as it could take up to 45 days for Medicaid approval.  Daughter understood and stated she and patient will complete the application by the end of the week. Daughter denied any additional resources at this time. SW provided daughter my contact information to contact if needed      SW will contact daughter within the next two weeks to assess progress made with completing application, discuss ACP items and offer assistance if needed. [facility] Transitions Team   [address] [place] [address] Ambulatory Care Coordination Team  [phone]     12/17/2019  1:30pm  SW attempted to contact patient's daughter for follow up. SW left a detailed voicemail requesting a call back, and suggested that she leave on my voicemail best time/date to reach her. SW will follow up at a later date. 12/13/2019  1:25pm  Patient's daughter requested assistance with understanding medicaid process and initiating application.   SW contacted daughter however, she reported that she was unable to talk and stated she would contact SW [phone] at 1:30pm.      SW will attempt contact on 12-16-19 if call is not returned. SW will offer assistance with medicaid process and additional resources if needed. [facility] [facility] Transitions Team     10/27/20 [facility] 10/20-10/25 DKCELINE. Spoke with daughter today, thinks mother is doing well. Advised her that per [address] in yesterday's visit, meds could not be found, unable to locate glucometer and oven door was open for heat. [place] nurse notified APS of concerns. Daughter says brother to find her meds. Trying to set up Meet and Greet for aide to meet mom so she can start with personal care 5 x week. Suggested she consider higher level of care for mother, daughter not receptive. CTN to notify Adama Turner, of patient's return to home to see if she can assist with any additional resources. nydia

## 2020-10-30 ENCOUNTER — HOME CARE VISIT (OUTPATIENT)
Dept: SCHEDULING | Facility: HOME HEALTH | Age: 80
End: 2020-10-30
Payer: MEDICARE

## 2020-10-30 ENCOUNTER — APPOINTMENT (OUTPATIENT)
Dept: CT IMAGING | Age: 80
DRG: 871 | End: 2020-10-30
Attending: EMERGENCY MEDICINE
Payer: MEDICARE

## 2020-10-30 VITALS
TEMPERATURE: 98.7 F | SYSTOLIC BLOOD PRESSURE: 121 MMHG | OXYGEN SATURATION: 100 % | WEIGHT: 128.97 LBS | HEART RATE: 80 BPM | RESPIRATION RATE: 23 BRPM | DIASTOLIC BLOOD PRESSURE: 86 MMHG | HEIGHT: 63 IN | BODY MASS INDEX: 22.85 KG/M2

## 2020-10-30 VITALS
HEART RATE: 87 BPM | SYSTOLIC BLOOD PRESSURE: 140 MMHG | OXYGEN SATURATION: 98 % | TEMPERATURE: 97.2 F | RESPIRATION RATE: 18 BRPM | DIASTOLIC BLOOD PRESSURE: 56 MMHG

## 2020-10-30 LAB
APPEARANCE UR: CLEAR
BACTERIA URNS QL MICRO: NEGATIVE /HPF
BILIRUB UR QL: NEGATIVE
COLOR UR: ABNORMAL
EPITH CASTS URNS QL MICRO: ABNORMAL /LPF
GLUCOSE BLD STRIP.AUTO-MCNC: 269 MG/DL (ref 65–100)
GLUCOSE UR STRIP.AUTO-MCNC: >1000 MG/DL
HGB UR QL STRIP: NEGATIVE
HYALINE CASTS URNS QL MICRO: ABNORMAL /LPF (ref 0–5)
KETONES UR QL STRIP.AUTO: 40 MG/DL
LEUKOCYTE ESTERASE UR QL STRIP.AUTO: NEGATIVE
NITRITE UR QL STRIP.AUTO: NEGATIVE
PH UR STRIP: 5.5 [PH] (ref 5–8)
PROT UR STRIP-MCNC: NEGATIVE MG/DL
RBC #/AREA URNS HPF: ABNORMAL /HPF (ref 0–5)
SERVICE CMNT-IMP: ABNORMAL
SP GR UR REFRACTOMETRY: 1.01 (ref 1–1.03)
UA: UC IF INDICATED,UAUC: ABNORMAL
UROBILINOGEN UR QL STRIP.AUTO: 0.2 EU/DL (ref 0.2–1)
WBC URNS QL MICRO: ABNORMAL /HPF (ref 0–4)

## 2020-10-30 PROCEDURE — 72125 CT NECK SPINE W/O DYE: CPT

## 2020-10-30 PROCEDURE — 74011636637 HC RX REV CODE- 636/637: Performed by: EMERGENCY MEDICINE

## 2020-10-30 PROCEDURE — 3331090002 HH PPS REVENUE DEBIT

## 2020-10-30 PROCEDURE — 3331090001 HH PPS REVENUE CREDIT

## 2020-10-30 PROCEDURE — 70450 CT HEAD/BRAIN W/O DYE: CPT

## 2020-10-30 PROCEDURE — 82962 GLUCOSE BLOOD TEST: CPT

## 2020-10-30 PROCEDURE — G0299 HHS/HOSPICE OF RN EA 15 MIN: HCPCS

## 2020-10-30 RX ADMIN — HUMAN INSULIN 10 UNITS: 100 INJECTION, SOLUTION SUBCUTANEOUS at 00:35

## 2020-10-30 NOTE — DISCHARGE INSTRUCTIONS
Learning About High Blood Sugar  What is high blood sugar? Your body turns the food you eat into glucose (sugar), which it uses for energy. But if your body isn't able to use the sugar right away, it can build up in your blood and lead to high blood sugar. When the amount of sugar in your blood stays too high for too much of the time, you may have diabetes. Diabetes is a disease that can cause serious health problems. The good news is that lifestyle changes may help you get your blood sugar back to normal and avoid or delay diabetes. What causes high blood sugar? Sugar (glucose) can build up in your blood if you:  · Are overweight. · Have a family history of diabetes. · Take certain medicines, such as steroids. What are the symptoms? Having high blood sugar may not cause any symptoms at all. Or it may make you feel very thirsty or very hungry. You may also urinate more often than usual, have blurry vision, or lose weight without trying. How is high blood sugar treated? You can take steps to lower your blood sugar level if you understand what makes it get higher. Your doctor may want you to learn how to test your blood sugar level at home. Then you can see how illness, stress, or different kinds of food or medicine raise or lower your blood sugar level. Other tests may be needed to see if you have diabetes. How can you prevent high blood sugar? · Watch your weight. If you're overweight, losing just a small amount of weight may help. Reducing fat around your waist is most important. · Limit the amount of calories, sweets, and unhealthy fat you eat. Ask your doctor if a dietitian can help you. A registered dietitian can help you create meal plans that fit your lifestyle. · Get at least 30 minutes of exercise on most days of the week. Exercise helps control your blood sugar. It also helps you maintain a healthy weight. Walking is a good choice.  You also may want to do other activities, such as running, swimming, cycling, or playing tennis or team sports. · If your doctor prescribed medicines, take them exactly as prescribed. Call your doctor if you think you are having a problem with your medicine. You will get more details on the specific medicines your doctor prescribes. Follow-up care is a key part of your treatment and safety. Be sure to make and go to all appointments, and call your doctor if you are having problems. It's also a good idea to know your test results and keep a list of the medicines you take. Where can you learn more? Go to http://www.gray.com/  Enter O108 in the search box to learn more about \"Learning About High Blood Sugar. \"  Current as of: December 20, 2019               Content Version: 12.6  © 4911-6662 [employer]. Care instructions adapted under license by [employer] (which disclaims liability or warranty for this information). If you have questions about a medical condition or this instruction, always ask your healthcare professional. [place] [address] any warranty or liability for your use of this information.             White Blood Cell Differential: About This Test  What is it? A white blood cell differential counts the different types of white blood cells in a blood sample. There are five major kinds of white blood cells. The numbers of each type of white blood cell give important information about your immune system. Why is this test done? The test helps to measure how the body is responding to different types of infections. It can also help measure certain allergic reactions. It can sometimes be helpful in finding the stage of leukemia or how the body is responding to chemotherapy. How do you prepare for the test?  In general, there's nothing you have to do before this test, unless your doctor tells you to. How is the test done?   A health professional uses a needle to take a blood sample, usually from the arm. Your doctor may order a blood smear test to be done at the same time. In this test, a drop of blood is smeared on a slide and stained with a special dye. The slide is looked at under a microscope. The number, size, and shape of red blood cells, white blood cells, and platelets are recorded. Blood cells with different shapes or sizes can help diagnose many blood diseases, such as leukemia, malaria, and sickle cell disease. How long does the test take? This test will take a few minutes. What happens after the test?  · You will probably be able to go home right away. · You can go back to your usual activities right away. Follow-up care is a key part of your treatment and safety. Be sure to make and go to all appointments, and call your doctor if you are having problems. It's also a good idea to keep a list of the medicines you take. Ask your doctor when you can expect to have your test results. Where can you learn more? Go to http://www.gray.com/  Enter T637 in the search box to learn more about \"White Blood Cell Differential: About This Test.\"  Current as of: December 9, 2019               Content Version: 12.6  © 1620-5208 [employer]. Care instructions adapted under license by [employer] (which disclaims liability or warranty for this information).  If you have questions about a medical condition or this instruction, always ask your healthcare professional. [place] [address] any warranty or liability for your use of this information.

## 2020-10-30 NOTE — ED NOTES
Patient was provided with discharge instructions. Instructions and any medications were reviewed with the patient &/or family by Dr. Leila Zelaya. Questions and concerns addressed by the provider. Patient discharged in stable condition via [facility], RN and was escorted by wheelchair to the waiting room to wait for her cab.

## 2020-10-30 NOTE — ED PROVIDER NOTES
EMERGENCY DEPARTMENT HISTORY AND PHYSICAL EXAM     ----------------------------------------------------------------------------  Please note that this dictation was completed with [facility], the computer voice recognition software. Quite often unanticipated grammatical, syntax, homophones, and other interpretive errors are inadvertently transcribed by the computer software. Please disregard these errors. Please excuse any errors that have escaped final proofreading  ----------------------------------------------------------------------------      Date: 10/29/2020   Patient Name: Dania Diane    History of Presenting Illness     Chief Complaint   Patient presents with    Fall     Pt arrives vis EMS, fell backwards into the bathtub, but doesn't remember the fall. BG > 600.  High Blood Sugar       History Provided By:  Patient    HPI: Dania Diane is a 67 y.o. female, with significant pmhx of diabetes,, CHF, hypertension, previous stroke who presents via  EMS to the ED with c/o hyperglycemia. Patient notes having no complaints at time of my evaluation. Patient also specifically denies any associated fevers, chills, CP, SOB, nausea, vomiting, diarrhea, abd pain, changes in BM, urinary sxs, or headache. Per EMS patient fell in the bathtub but does not recall the fall. Social Hx: denies tobacco  denies EtOH , denies Illicit Drugs    There are no other complaints, changes, or physical findings at this time. PCP: Refugio Gardner MD    No Known Allergies    Current Outpatient Medications   Medication Sig Dispense Refill    insulin degludec Vikki Skeens FlexTouch U-100) 100 unit/mL (3 mL) inpn 12 Units by SubCUTAneous route daily. 2 Adjustable Dose Pre-filled Pen Syringe 11    insulin lispro (HUMALOG) 100 unit/mL kwikpen 2 units before breakfast and lunch only 2 Adjustable Dose Pre-filled Pen Syringe 11    amLODIPine (NORVASC) 10 mg tablet Take 1 Tab by mouth daily.  30 Tab 11    zinc oxide 20 % ointment Apply 1 Applicator to affected area two (2) times a day. thin layer gluteal cleft      losartan-hydroCHLOROthiazide (HYZAAR) 100-25 mg per tablet Take 1 Tab by mouth daily.  metoprolol succinate (TOPROL-XL) 25 mg XL tablet TAKE 1 TABLET BY MOUTH EVERY DAY 90 Tab 3    pravastatin (PRAVACHOL) 80 mg tablet TAKE 1 TABLET BY MOUTH at bedtime 90 Tab 3    levothyroxine (SYNTHROID) 175 mcg tablet TAKE 1 TABLET BY MOUTH EVERY DAY 90 Tab 3    clopidogreL (PLAVIX) 75 mg tab TAKE 1 TABLET BY MOUTH EVERY DAY 90 Tab 3    brimonidine (ALPHAGAN) 0.15 % ophthalmic solution Administer 1 Drop to both eyes two (2) times a day. Past History     Past Medical History:  Past Medical History:   Diagnosis Date    Heart failure ([address]     unknown to family    Hypertension     Stroke [facility])        Past Surgical History:  Past Surgical History:   Procedure Laterality Date    HX GYN      HX HEENT      thyroidectomy    HX HYSTERECTOMY      REMOVAL GALLBLADDER      THYROIDECTOMY         Family History:  Family History   Problem Relation Age of Onset    Diabetes Father     No Known Problems Mother        Social History:  Social History     Tobacco Use    Smoking status: Never Smoker    Smokeless tobacco: Never Used   Substance Use Topics    Alcohol use: No     Comment: been years    Drug use: No       Allergies:  No Known Allergies      Review of Systems   Review of Systems   Constitutional: Negative. Negative for fever. Eyes: Negative. Respiratory: Negative. Negative for shortness of breath. Cardiovascular: Negative for chest pain. Gastrointestinal: Negative for abdominal pain, nausea and vomiting. Endocrine: Negative. Genitourinary: Negative. Negative for difficulty urinating, dysuria and hematuria. Musculoskeletal: Negative. Skin: Negative. Neurological: Negative. Psychiatric/Behavioral: Negative for suicidal ideas. All other systems reviewed and are negative.         Physical Exam Physical Exam  Vitals signs and nursing note reviewed. Constitutional:       General: She is not in acute distress. Appearance: She is well-developed. She is not diaphoretic. HENT:      Head: Normocephalic and atraumatic. Nose: Nose normal.   Eyes:      General: No scleral icterus. Conjunctiva/sclera: Conjunctivae normal.   Neck:      Musculoskeletal: Normal range of motion. Trachea: No tracheal deviation. Cardiovascular:      Rate and Rhythm: Normal rate and regular rhythm. Heart sounds: Normal heart sounds. No murmur. No friction rub. Pulmonary:      Effort: Pulmonary effort is normal. No respiratory distress. Breath sounds: Normal breath sounds. No stridor. No wheezing or rales. Abdominal:      General: Bowel sounds are normal. There is no distension. Palpations: Abdomen is soft. Tenderness: There is no abdominal tenderness. There is no rebound. Musculoskeletal: Normal range of motion. General: No tenderness. Skin:     General: Skin is warm and dry. Findings: No rash. Neurological:      Mental Status: She is alert. She is disoriented. Cranial Nerves: No cranial nerve deficit. Psychiatric:         Speech: Speech normal.         Behavior: Behavior normal.         Thought Content:  Thought content normal.         Judgment: Judgment normal.           Diagnostic Study Results     Labs -     Recent Results (from the past 12 hour(s))   GLUCOSE, POC    Collection Time: 10/29/20  8:34 PM   Result Value Ref Range    Glucose (POC) >600 (HH) 65 - 100 mg/dL    Performed by Jaswinder Umaña RN    METABOLIC PANEL, COMPREHENSIVE    Collection Time: 10/29/20  9:10 PM   Result Value Ref Range    Sodium 132 (L) 136 - 145 mmol/L    Potassium 4.1 3.5 - 5.1 mmol/L    Chloride 97 97 - 108 mmol/L    CO2 26 21 - 32 mmol/L    Anion gap 9 5 - 15 mmol/L    Glucose 648 (HH) 65 - 100 mg/dL    BUN 15 6 - 20 MG/DL    Creatinine 1.05 (H) 0.55 - 1.02 MG/DL    BUN/Creatinine ratio 14 12 - 20      GFR est AA >60 >60 ml/min/1.73m2    GFR est non-AA 51 (L) >60 ml/min/1.73m2    Calcium 8.7 8.5 - 10.1 MG/DL    Bilirubin, total 0.5 0.2 - 1.0 MG/DL    ALT (SGPT) 156 (H) 12 - 78 U/L    AST (SGOT) 243 (H) 15 - 37 U/L    Alk. phosphatase 241 (H) 45 - 117 U/L    Protein, total 6.5 6.4 - 8.2 g/dL    Albumin 2.9 (L) 3.5 - 5.0 g/dL    Globulin 3.6 2.0 - 4.0 g/dL    A-G Ratio 0.8 (L) 1.1 - 2.2     CBC WITH AUTOMATED DIFF    Collection Time: 10/29/20  9:10 PM   Result Value Ref Range    WBC 2.8 (L) 3.6 - 11.0 K/uL    RBC 3.60 (L) 3.80 - 5.20 M/uL    HGB 11.1 (L) 11.5 - 16.0 g/dL    HCT 33.0 (L) 35.0 - 47.0 %    MCV 91.7 80.0 - 99.0 FL    MCH 30.8 26.0 - 34.0 PG    MCHC 33.6 30.0 - 36.5 g/dL    RDW 16.2 (H) 11.5 - 14.5 %    PLATELET 659 961 - 804 K/uL    MPV 10.5 8.9 - 12.9 FL    NRBC 0.0 0  WBC    ABSOLUTE NRBC 0.00 0.00 - 0.01 K/uL    NEUTROPHILS 54 32 - 75 %    LYMPHOCYTES 22 12 - 49 %    MONOCYTES 20 (H) 5 - 13 %    EOSINOPHILS 1 0 - 7 %    BASOPHILS 0 0 - 1 %    IMMATURE GRANULOCYTES 3 (H) 0.0 - 0.5 %    ABS. NEUTROPHILS 1.5 (L) 1.8 - 8.0 K/UL    ABS. LYMPHOCYTES 0.6 (L) 0.8 - 3.5 K/UL    ABS. MONOCYTES 0.6 0.0 - 1.0 K/UL    ABS. EOSINOPHILS 0.0 0.0 - 0.4 K/UL    ABS. BASOPHILS 0.0 0.0 - 0.1 K/UL    ABS. IMM.  GRANS. 0.1 (H) 0.00 - 0.04 K/UL    DF SMEAR SCANNED      RBC COMMENTS MACROCYTOSIS  PRESENT       URINALYSIS W/ REFLEX CULTURE    Collection Time: 10/29/20 10:45 PM    Specimen: Urine   Result Value Ref Range    Color YELLOW/STRAW      Appearance CLEAR CLEAR      Specific gravity 1.010 1.003 - 1.030      pH (UA) 5.5 5.0 - 8.0      Protein Negative NEG mg/dL    Glucose >1,000 (A) NEG mg/dL    Ketone 40 (A) NEG mg/dL    Bilirubin Negative NEG      Blood Negative NEG      Urobilinogen 0.2 0.2 - 1.0 EU/dL    Nitrites Negative NEG      Leukocyte Esterase Negative NEG      UA:UC IF INDICATED CULTURE NOT INDICATED BY UA RESULT CNI      WBC 0-4 0 - 4 /hpf    RBC 0-5 0 - 5 /hpf    Epithelial cells FEW FEW /lpf    Bacteria Negative NEG /hpf    Hyaline cast 0-2 0 - 5 /lpf   GLUCOSE, POC    Collection Time: 10/29/20 11:30 PM   Result Value Ref Range    Glucose (POC) 457 (H) 65 - 100 mg/dL    Performed by Lonn Mortimer    GLUCOSE, POC    Collection Time: 10/30/20  1:17 AM   Result Value Ref Range    Glucose (POC) 269 (H) 65 - 100 mg/dL    Performed by Kerry Baker RN        Radiologic Studies -   CT HEAD WO CONT   Final Result   IMPRESSION:    No acute intracranial process. Imaging findings consistent with moderate chronic microvascular ischemic change. There is a moderate to severe degree of cerebral atrophy. CT SPINE CERV WO CONT   Final Result   IMPRESSION:   There is no acute fracture or dislocation identified. Left-sided pleural effusion. CT Results  (Last 48 hours)               10/30/20 0018  CT HEAD WO CONT Final result    Impression:  IMPRESSION:    No acute intracranial process. Imaging findings consistent with moderate chronic microvascular ischemic change. There is a moderate to severe degree of cerebral atrophy. Narrative:  CLINICAL HISTORY: fall   INDICATION: fall   COMPARISON: 10/20/2020. CT dose reduction was achieved through use of a standardized protocol tailored   for this examination and automatic exposure control for dose modulation. TECHNIQUE: Serial axial images with a collimation of 5 mm were obtained from the   skull base through the vertex     FINDINGS:    There is sulcal and ventricular prominence. Confluent periventricular and   scattered foci of hypodensity in the cerebral white matter. There is no evidence   of an acute infarction, hemorrhage, or mass-effect. There is no evidence of   midline shift or hydrocephalus. Posterior fossa structures are unremarkable. No   extra-axial collections are seen. Mastoid air cells are well pneumatized and clear.      There is no evidence of depressed skull fractures of soft tissue swelling. 10/30/20 0018  CT SPINE CERV WO CONT Final result    Impression:  IMPRESSION:   There is no acute fracture or dislocation identified. Left-sided pleural effusion. Narrative:  EXAM: CT SPINE CERV WO CONT   CLINICAL HISTORY: fall   INDICATION: fall   COMPARISON:  None   TECHNIQUE:  Axial neck CT was performed. Noncontrast imaging obtained. Coronal   and sagittal reconstructions were performed. CT dose reduction was achieved through use of a standardized protocol tailored   for this examination and automatic exposure control for dose modulation. Osseous/bone algorithm was utilized. FINDINGS:   The vertebral bodies are anatomically aligned. There is no evidence of fracture   or subluxation. The prevertebral soft tissues are grossly within normal limits. The atlantodental interval is within normal limits. The craniocervical junction   is intact. Left-sided pleural effusion. No pneumothorax. Canal and foraminal stenoses of   the cervical spine. CXR Results  (Last 48 hours)    None            Medical Decision Making   I am the first provider for this patient. I reviewed the vital signs, available nursing notes, past medical history, past surgical history, family history and social history. Vital Signs-Reviewed the patient's vital signs. Patient Vitals for the past 12 hrs:   Temp Pulse Resp BP SpO2   10/29/20 2018 98.7 °F (37.1 °C) 75 13 (!) 164/63 99 %       Pulse Oximetry Analysis - 99% on RA    Cardiac Monitor:   Rate: 75 bpm  Rhythm: nsr      Provider Notes (Medical Decision Making):     DDX:  Hyperglycemia, DKA, dehydration, UTI    Plan:  Labs, insulin, IV fluids    Impression:  Hyperglycemia    ED Course:   Initial assessment performed. The patients presenting problems have been discussed, and they are in agreement with the care plan formulated and outlined with them. I have encouraged them to ask questions as they arise throughout their visit.     I reviewed our electronic medical record system for any past medical records that were available that may contribute to the patients current condition, the nursing notes and and vital signs from today's visit    Nursing notes will be reviewed as they become available in realtime while the pt has been in the ED. Yari Carlisle MD    HYPERTENSION COUNSELING:  Patient made aware of their elevated blood pressure and is instructed to follow up this week with their Primary Care or [address] [id number] for a recheck (should they be discharged.) Patient is counseled regarding consequences of chronic, uncontrolled hypertension including kidney disease, heart disease, stroke or even death. Patient states their understanding    I personally reviewed/interpreted pt's imaging. Agree with official read by radiology as noted above. Yari Carlisle MD    PROGRESS NOTE:  11:53 PM  Pt with improvement of blood sugar after initial bolus of insulin and half liter of fluid. We will continue with additional 10 units of insulin and recheck in an hour. Yari Carlisle MD      1:34 AM  Progress note:  Pt noted to be feeling better, ready for discharge. Discussed lab and imaging findings with pt, specifically noting white blood cell count. Pt will follow up with pcp as instructed. All questions have been answered, pt voiced understanding and agreement with plan. Specific return precautions provided in addition to instructions for pt to return to the ED immediately should sx worsen at any time. Yari Carlisle MD           Critical Care Time:     none      Diagnosis     Clinical Impression:   1. Hyperglycemia    2. Leukopenia, unspecified type        PLAN:  1. Current Discharge Medication List        2.    Follow-up Information     Follow up With Specialties Details Why Contact Info    Ray Tang MD Internal Medicine Schedule an appointment as soon as possible for a visit in 2 days  [place] [id number]  [phone]          Return to ED if worse     Disposition:  1:36 AM  The patient's results have been reviewed with family and/or caregiver. They verbally convey their understanding and agreement of the patient's signs, symptoms, diagnosis, treatment and prognosis and additionally agree to follow up as recommended in the discharge instructions or to return to the Emergency Room should the patient's condition change prior to their follow-up appointment. The family and/or caregiver verbally agrees with the care-plan and all of their questions have been answered. The discharge instructions have also been provided to the them with educational information regarding the patient's diagnosis as well a list of reasons why the patient would want to return to the ER prior to their follow-up appointment should their condition change.   Patricia Yanez MD

## 2020-10-31 ENCOUNTER — APPOINTMENT (OUTPATIENT)
Dept: GENERAL RADIOLOGY | Age: 80
DRG: 871 | End: 2020-10-31
Attending: EMERGENCY MEDICINE
Payer: MEDICARE

## 2020-10-31 ENCOUNTER — HOSPITAL ENCOUNTER (INPATIENT)
Age: 80
LOS: 12 days | Discharge: HOME HEALTH | DRG: 871 | End: 2020-11-12
Attending: EMERGENCY MEDICINE | Admitting: INTERNAL MEDICINE
Payer: MEDICARE

## 2020-10-31 DIAGNOSIS — B95.7 BACTEREMIA DUE TO METHICILLIN RESISTANT STAPHYLOCOCCUS EPIDERMIDIS: ICD-10-CM

## 2020-10-31 DIAGNOSIS — E03.2 HYPOTHYROIDISM DUE TO MEDICATION: ICD-10-CM

## 2020-10-31 DIAGNOSIS — A41.9 SEPTIC SHOCK (HCC): ICD-10-CM

## 2020-10-31 DIAGNOSIS — N17.1: ICD-10-CM

## 2020-10-31 DIAGNOSIS — R78.81 COAGULASE NEGATIVE STAPHYLOCOCCUS BACTEREMIA: ICD-10-CM

## 2020-10-31 DIAGNOSIS — A41.9: ICD-10-CM

## 2020-10-31 DIAGNOSIS — I47.29 NSVT (NONSUSTAINED VENTRICULAR TACHYCARDIA): ICD-10-CM

## 2020-10-31 DIAGNOSIS — R78.81 BACTEREMIA DUE TO METHICILLIN RESISTANT STAPHYLOCOCCUS EPIDERMIDIS: ICD-10-CM

## 2020-10-31 DIAGNOSIS — R78.81 GRAM-POSITIVE COCCI BACTEREMIA: ICD-10-CM

## 2020-10-31 DIAGNOSIS — N17.9 ACUTE KIDNEY INJURY (HCC): ICD-10-CM

## 2020-10-31 DIAGNOSIS — R65.20: ICD-10-CM

## 2020-10-31 DIAGNOSIS — N17.9 AKI (ACUTE KIDNEY INJURY) (HCC): ICD-10-CM

## 2020-10-31 DIAGNOSIS — E10.10 DIABETIC KETOACIDOSIS WITHOUT COMA ASSOCIATED WITH TYPE 1 DIABETES MELLITUS (HCC): ICD-10-CM

## 2020-10-31 DIAGNOSIS — I95.9 HYPOTENSION, UNSPECIFIED HYPOTENSION TYPE: ICD-10-CM

## 2020-10-31 DIAGNOSIS — B95.7 COAGULASE NEGATIVE STAPHYLOCOCCUS BACTEREMIA: ICD-10-CM

## 2020-10-31 DIAGNOSIS — E10.11 DIABETIC KETOACIDOSIS WITH COMA ASSOCIATED WITH TYPE 1 DIABETES MELLITUS (HCC): Primary | ICD-10-CM

## 2020-10-31 DIAGNOSIS — G93.41 ACUTE METABOLIC ENCEPHALOPATHY: ICD-10-CM

## 2020-10-31 DIAGNOSIS — Z16.29 BACTEREMIA DUE TO METHICILLIN RESISTANT STAPHYLOCOCCUS EPIDERMIDIS: ICD-10-CM

## 2020-10-31 DIAGNOSIS — E86.0 SEVERE DEHYDRATION: ICD-10-CM

## 2020-10-31 DIAGNOSIS — E87.20 METABOLIC ACIDOSIS: ICD-10-CM

## 2020-10-31 DIAGNOSIS — E11.10 DIABETIC KETOACIDOSIS WITHOUT COMA ASSOCIATED WITH TYPE 2 DIABETES MELLITUS (HCC): ICD-10-CM

## 2020-10-31 DIAGNOSIS — R65.21 SEPTIC SHOCK (HCC): ICD-10-CM

## 2020-10-31 DIAGNOSIS — E86.0 DEHYDRATION: ICD-10-CM

## 2020-10-31 LAB
ALBUMIN SERPL-MCNC: 3 G/DL (ref 3.5–5)
ALBUMIN/GLOB SERPL: 0.9 {RATIO} (ref 1.1–2.2)
ALP SERPL-CCNC: 302 U/L (ref 45–117)
ALT SERPL-CCNC: 190 U/L (ref 12–78)
ANION GAP SERPL CALC-SCNC: ABNORMAL MMOL/L (ref 5–15)
ARTERIAL PATENCY WRIST A: NO
AST SERPL-CCNC: 227 U/L (ref 15–37)
BASOPHILS # BLD: 0 K/UL (ref 0–0.1)
BASOPHILS NFR BLD: 0 % (ref 0–1)
BDY SITE: ABNORMAL
BILIRUB SERPL-MCNC: 0.5 MG/DL (ref 0.2–1)
BUN SERPL-MCNC: 35 MG/DL (ref 6–20)
BUN/CREAT SERPL: 19 (ref 12–20)
CA-I BLD-SCNC: 1.12 MMOL/L (ref 1.12–1.32)
CALCIUM SERPL-MCNC: 8.7 MG/DL (ref 8.5–10.1)
CHLORIDE SERPL-SCNC: 91 MMOL/L (ref 97–108)
CO2 SERPL-SCNC: <5 MMOL/L (ref 21–32)
CREAT SERPL-MCNC: 1.81 MG/DL (ref 0.55–1.02)
DIFFERENTIAL METHOD BLD: ABNORMAL
EOSINOPHIL # BLD: 0 K/UL (ref 0–0.4)
EOSINOPHIL NFR BLD: 0 % (ref 0–7)
ERYTHROCYTE [DISTWIDTH] IN BLOOD BY AUTOMATED COUNT: 17.7 % (ref 11.5–14.5)
GAS FLOW.O2 O2 DELIVERY SYS: ABNORMAL L/MIN
GLOBULIN SER CALC-MCNC: 3.4 G/DL (ref 2–4)
GLUCOSE BLD STRIP.AUTO-MCNC: >600 MG/DL (ref 65–100)
GLUCOSE SERPL-MCNC: 1214 MG/DL (ref 65–100)
HCT VFR BLD AUTO: 34 % (ref 35–47)
HGB BLD-MCNC: 9.9 G/DL (ref 11.5–16)
IMM GRANULOCYTES # BLD AUTO: 0 K/UL (ref 0–0.04)
IMM GRANULOCYTES NFR BLD AUTO: 0 % (ref 0–0.5)
LIPASE SERPL-CCNC: 31 U/L (ref 73–393)
LYMPHOCYTES # BLD: 1.9 K/UL (ref 0.8–3.5)
LYMPHOCYTES NFR BLD: 14 % (ref 12–49)
MAGNESIUM SERPL-MCNC: 2.4 MG/DL (ref 1.6–2.4)
MCH RBC QN AUTO: 30.9 PG (ref 26–34)
MCHC RBC AUTO-ENTMCNC: 29.1 G/DL (ref 30–36.5)
MCV RBC AUTO: 106.3 FL (ref 80–99)
METAMYELOCYTES NFR BLD MANUAL: 2 %
MONOCYTES # BLD: 0.5 K/UL (ref 0–1)
MONOCYTES NFR BLD: 4 % (ref 5–13)
MYELOCYTES NFR BLD MANUAL: 3 %
NEUTS BAND NFR BLD MANUAL: 5 %
NEUTS SEG # BLD: 10.4 K/UL (ref 1.8–8)
NEUTS SEG NFR BLD: 72 % (ref 32–75)
NRBC # BLD: 0.02 K/UL (ref 0–0.01)
NRBC BLD-RTO: 0.1 PER 100 WBC
PCO2 BLD: <15 MMHG (ref 35–45)
PH BLD: 7.01 [PH] (ref 7.35–7.45)
PHOSPHATE SERPL-MCNC: 7.3 MG/DL (ref 2.6–4.7)
PLATELET # BLD AUTO: 332 K/UL (ref 150–400)
PMV BLD AUTO: 10.4 FL (ref 8.9–12.9)
PO2 BLD: 92 MMHG (ref 80–100)
POTASSIUM SERPL-SCNC: 5.9 MMOL/L (ref 3.5–5.1)
PROT SERPL-MCNC: 6.4 G/DL (ref 6.4–8.2)
RBC # BLD AUTO: 3.2 M/UL (ref 3.8–5.2)
RBC MORPH BLD: ABNORMAL
SERVICE CMNT-IMP: ABNORMAL
SODIUM SERPL-SCNC: 126 MMOL/L (ref 136–145)
SPECIMEN TYPE: ABNORMAL
TROPONIN I SERPL-MCNC: <0.05 NG/ML
WBC # BLD AUTO: 13.5 K/UL (ref 3.6–11)

## 2020-10-31 PROCEDURE — 36415 COLL VENOUS BLD VENIPUNCTURE: CPT

## 2020-10-31 PROCEDURE — 96365 THER/PROPH/DIAG IV INF INIT: CPT

## 2020-10-31 PROCEDURE — 71045 X-RAY EXAM CHEST 1 VIEW: CPT

## 2020-10-31 PROCEDURE — 75810000455 HC PLCMT CENT VENOUS CATH LVL 2 5182

## 2020-10-31 PROCEDURE — 74011000258 HC RX REV CODE- 258: Performed by: EMERGENCY MEDICINE

## 2020-10-31 PROCEDURE — 87077 CULTURE AEROBIC IDENTIFY: CPT

## 2020-10-31 PROCEDURE — 82803 BLOOD GASES ANY COMBINATION: CPT

## 2020-10-31 PROCEDURE — 77030040922 HC BLNKT HYPOTHRM STRY -A

## 2020-10-31 PROCEDURE — 84100 ASSAY OF PHOSPHORUS: CPT

## 2020-10-31 PROCEDURE — 83735 ASSAY OF MAGNESIUM: CPT

## 2020-10-31 PROCEDURE — 74011000250 HC RX REV CODE- 250: Performed by: EMERGENCY MEDICINE

## 2020-10-31 PROCEDURE — 74011250636 HC RX REV CODE- 250/636: Performed by: EMERGENCY MEDICINE

## 2020-10-31 PROCEDURE — 93005 ELECTROCARDIOGRAM TRACING: CPT

## 2020-10-31 PROCEDURE — 82962 GLUCOSE BLOOD TEST: CPT

## 2020-10-31 PROCEDURE — 85025 COMPLETE CBC W/AUTO DIFF WBC: CPT

## 2020-10-31 PROCEDURE — 96367 TX/PROPH/DG ADDL SEQ IV INF: CPT

## 2020-10-31 PROCEDURE — 99285 EMERGENCY DEPT VISIT HI MDM: CPT

## 2020-10-31 PROCEDURE — 3331090001 HH PPS REVENUE CREDIT

## 2020-10-31 PROCEDURE — 96375 TX/PRO/DX INJ NEW DRUG ADDON: CPT

## 2020-10-31 PROCEDURE — 80053 COMPREHEN METABOLIC PANEL: CPT

## 2020-10-31 PROCEDURE — C1751 CATH, INF, PER/CENT/MIDLINE: HCPCS

## 2020-10-31 PROCEDURE — 36600 WITHDRAWAL OF ARTERIAL BLOOD: CPT

## 2020-10-31 PROCEDURE — 2709999900 HC NON-CHARGEABLE SUPPLY

## 2020-10-31 PROCEDURE — 84484 ASSAY OF TROPONIN QUANT: CPT

## 2020-10-31 PROCEDURE — 87040 BLOOD CULTURE FOR BACTERIA: CPT

## 2020-10-31 PROCEDURE — 3331090002 HH PPS REVENUE DEBIT

## 2020-10-31 PROCEDURE — 74011636637 HC RX REV CODE- 636/637: Performed by: EMERGENCY MEDICINE

## 2020-10-31 PROCEDURE — 65660000000 HC RM CCU STEPDOWN

## 2020-10-31 PROCEDURE — 83690 ASSAY OF LIPASE: CPT

## 2020-10-31 PROCEDURE — 02HV33Z INSERTION OF INFUSION DEVICE INTO SUPERIOR VENA CAVA, PERCUTANEOUS APPROACH: ICD-10-PCS | Performed by: EMERGENCY MEDICINE

## 2020-10-31 PROCEDURE — 87186 SC STD MICRODIL/AGAR DIL: CPT

## 2020-10-31 RX ORDER — PROMETHAZINE HYDROCHLORIDE 25 MG/1
12.5 TABLET ORAL
Status: DISCONTINUED | OUTPATIENT
Start: 2020-10-31 | End: 2020-11-12 | Stop reason: HOSPADM

## 2020-10-31 RX ORDER — PRAVASTATIN SODIUM 40 MG/1
80 TABLET ORAL DAILY
Status: DISCONTINUED | OUTPATIENT
Start: 2020-11-01 | End: 2020-11-12 | Stop reason: HOSPADM

## 2020-10-31 RX ORDER — DEXTROSE 50 % IN WATER (D50W) INTRAVENOUS SYRINGE
25-50 AS NEEDED
Status: DISCONTINUED | OUTPATIENT
Start: 2020-10-31 | End: 2020-11-01

## 2020-10-31 RX ORDER — MAGNESIUM SULFATE 100 %
4 CRYSTALS MISCELLANEOUS AS NEEDED
Status: DISCONTINUED | OUTPATIENT
Start: 2020-10-31 | End: 2020-11-01

## 2020-10-31 RX ORDER — SODIUM CHLORIDE 0.9 % (FLUSH) 0.9 %
5-40 SYRINGE (ML) INJECTION EVERY 8 HOURS
Status: DISCONTINUED | OUTPATIENT
Start: 2020-10-31 | End: 2020-11-12 | Stop reason: HOSPADM

## 2020-10-31 RX ORDER — SODIUM CHLORIDE 0.9 % (FLUSH) 0.9 %
5-40 SYRINGE (ML) INJECTION AS NEEDED
Status: DISCONTINUED | OUTPATIENT
Start: 2020-10-31 | End: 2020-11-12 | Stop reason: HOSPADM

## 2020-10-31 RX ORDER — NOREPINEPHRINE BITARTRATE/D5W 8 MG/250ML
.5-16 PLASTIC BAG, INJECTION (ML) INTRAVENOUS
Status: DISCONTINUED | OUTPATIENT
Start: 2020-10-31 | End: 2020-11-01

## 2020-10-31 RX ORDER — ACETAMINOPHEN 650 MG/1
650 SUPPOSITORY RECTAL
Status: DISCONTINUED | OUTPATIENT
Start: 2020-10-31 | End: 2020-11-12 | Stop reason: HOSPADM

## 2020-10-31 RX ORDER — POLYETHYLENE GLYCOL 3350 17 G/17G
17 POWDER, FOR SOLUTION ORAL DAILY PRN
Status: DISCONTINUED | OUTPATIENT
Start: 2020-10-31 | End: 2020-11-12 | Stop reason: HOSPADM

## 2020-10-31 RX ORDER — SODIUM BICARBONATE 1 MEQ/ML
50 SYRINGE (ML) INTRAVENOUS ONCE
Status: COMPLETED | OUTPATIENT
Start: 2020-10-31 | End: 2020-10-31

## 2020-10-31 RX ORDER — ONDANSETRON 2 MG/ML
4 INJECTION INTRAMUSCULAR; INTRAVENOUS
Status: DISCONTINUED | OUTPATIENT
Start: 2020-10-31 | End: 2020-11-12 | Stop reason: HOSPADM

## 2020-10-31 RX ORDER — CLOPIDOGREL BISULFATE 75 MG/1
75 TABLET ORAL DAILY
Status: DISCONTINUED | OUTPATIENT
Start: 2020-11-01 | End: 2020-11-12 | Stop reason: HOSPADM

## 2020-10-31 RX ORDER — ACETAMINOPHEN 325 MG/1
650 TABLET ORAL
Status: DISCONTINUED | OUTPATIENT
Start: 2020-10-31 | End: 2020-11-12 | Stop reason: HOSPADM

## 2020-10-31 RX ORDER — VANCOMYCIN 1.75 GRAM/500 ML IN 0.9 % SODIUM CHLORIDE INTRAVENOUS
1750 ONCE
Status: COMPLETED | OUTPATIENT
Start: 2020-11-01 | End: 2020-11-01

## 2020-10-31 RX ORDER — PHENYLEPHRINE HCL IN 0.9% NACL 0.4MG/10ML
100 SYRINGE (ML) INTRAVENOUS ONCE
Status: COMPLETED | OUTPATIENT
Start: 2020-10-31 | End: 2020-10-31

## 2020-10-31 RX ORDER — ENOXAPARIN SODIUM 100 MG/ML
40 INJECTION SUBCUTANEOUS DAILY
Status: DISCONTINUED | OUTPATIENT
Start: 2020-11-01 | End: 2020-11-12 | Stop reason: HOSPADM

## 2020-10-31 RX ORDER — SODIUM CHLORIDE 450 MG/100ML
250 INJECTION, SOLUTION INTRAVENOUS CONTINUOUS
Status: DISCONTINUED | OUTPATIENT
Start: 2020-10-31 | End: 2020-10-31

## 2020-10-31 RX ADMIN — SODIUM CHLORIDE 1000 ML: 900 INJECTION, SOLUTION INTRAVENOUS at 21:04

## 2020-10-31 RX ADMIN — SODIUM CHLORIDE 250 ML/HR: 450 INJECTION, SOLUTION INTRAVENOUS at 22:43

## 2020-10-31 RX ADMIN — SODIUM CHLORIDE 10.8 UNITS/HR: 9 INJECTION, SOLUTION INTRAVENOUS at 22:37

## 2020-10-31 RX ADMIN — SODIUM BICARBONATE 50 MEQ: 84 INJECTION, SOLUTION INTRAVENOUS at 22:23

## 2020-10-31 RX ADMIN — NOREPINEPHRINE BITARTRATE 0.5 MCG/MIN: 1 INJECTION, SOLUTION, CONCENTRATE INTRAVENOUS at 23:33

## 2020-10-31 RX ADMIN — SODIUM CHLORIDE 1000 ML: 900 INJECTION, SOLUTION INTRAVENOUS at 21:52

## 2020-10-31 RX ADMIN — Medication 100 MCG: at 23:03

## 2020-11-01 ENCOUNTER — APPOINTMENT (OUTPATIENT)
Dept: CT IMAGING | Age: 80
DRG: 871 | End: 2020-11-01
Attending: INTERNAL MEDICINE
Payer: MEDICARE

## 2020-11-01 ENCOUNTER — HOME CARE VISIT (OUTPATIENT)
Dept: HOME HEALTH SERVICES | Facility: HOME HEALTH | Age: 80
End: 2020-11-01
Payer: MEDICARE

## 2020-11-01 LAB
ALBUMIN SERPL-MCNC: 2.3 G/DL (ref 3.5–5)
ALBUMIN SERPL-MCNC: 2.6 G/DL (ref 3.5–5)
ALBUMIN/GLOB SERPL: 0.8 {RATIO} (ref 1.1–2.2)
ALBUMIN/GLOB SERPL: 0.8 {RATIO} (ref 1.1–2.2)
ALP SERPL-CCNC: 195 U/L (ref 45–117)
ALP SERPL-CCNC: 269 U/L (ref 45–117)
ALT SERPL-CCNC: 121 U/L (ref 12–78)
ALT SERPL-CCNC: 170 U/L (ref 12–78)
ANION GAP SERPL CALC-SCNC: 12 MMOL/L (ref 5–15)
ANION GAP SERPL CALC-SCNC: 2 MMOL/L (ref 5–15)
ANION GAP SERPL CALC-SCNC: 24 MMOL/L (ref 5–15)
ANION GAP SERPL CALC-SCNC: 7 MMOL/L (ref 5–15)
ANION GAP SERPL CALC-SCNC: ABNORMAL MMOL/L (ref 5–15)
APPEARANCE UR: ABNORMAL
AST SERPL-CCNC: 194 U/L (ref 15–37)
AST SERPL-CCNC: 74 U/L (ref 15–37)
ATRIAL RATE: 70 BPM
BACTERIA URNS QL MICRO: NEGATIVE /HPF
BASOPHILS # BLD: 0 K/UL (ref 0–0.1)
BASOPHILS NFR BLD: 0 % (ref 0–1)
BILIRUB SERPL-MCNC: 0.3 MG/DL (ref 0.2–1)
BILIRUB SERPL-MCNC: 0.4 MG/DL (ref 0.2–1)
BILIRUB UR QL: NEGATIVE
BUN SERPL-MCNC: 21 MG/DL (ref 6–20)
BUN SERPL-MCNC: 21 MG/DL (ref 6–20)
BUN SERPL-MCNC: 22 MG/DL (ref 6–20)
BUN SERPL-MCNC: 28 MG/DL (ref 6–20)
BUN SERPL-MCNC: 34 MG/DL (ref 6–20)
BUN/CREAT SERPL: 16 (ref 12–20)
BUN/CREAT SERPL: 16 (ref 12–20)
BUN/CREAT SERPL: 18 (ref 12–20)
BUN/CREAT SERPL: 21 (ref 12–20)
BUN/CREAT SERPL: 22 (ref 12–20)
CALCIUM SERPL-MCNC: 7.7 MG/DL (ref 8.5–10.1)
CALCIUM SERPL-MCNC: 7.7 MG/DL (ref 8.5–10.1)
CALCIUM SERPL-MCNC: 7.9 MG/DL (ref 8.5–10.1)
CALCIUM SERPL-MCNC: 8.2 MG/DL (ref 8.5–10.1)
CALCIUM SERPL-MCNC: 8.3 MG/DL (ref 8.5–10.1)
CALCULATED P AXIS, ECG09: 47 DEGREES
CALCULATED R AXIS, ECG10: -38 DEGREES
CALCULATED T AXIS, ECG11: -33 DEGREES
CHLORIDE SERPL-SCNC: 103 MMOL/L (ref 97–108)
CHLORIDE SERPL-SCNC: 107 MMOL/L (ref 97–108)
CHLORIDE SERPL-SCNC: 109 MMOL/L (ref 97–108)
CHLORIDE SERPL-SCNC: 110 MMOL/L (ref 97–108)
CHLORIDE SERPL-SCNC: 97 MMOL/L (ref 97–108)
CO2 SERPL-SCNC: 10 MMOL/L (ref 21–32)
CO2 SERPL-SCNC: 23 MMOL/L (ref 21–32)
CO2 SERPL-SCNC: 27 MMOL/L (ref 21–32)
CO2 SERPL-SCNC: 29 MMOL/L (ref 21–32)
CO2 SERPL-SCNC: <5 MMOL/L (ref 21–32)
COLOR UR: ABNORMAL
CREAT SERPL-MCNC: 0.97 MG/DL (ref 0.55–1.02)
CREAT SERPL-MCNC: 1.02 MG/DL (ref 0.55–1.02)
CREAT SERPL-MCNC: 1.35 MG/DL (ref 0.55–1.02)
CREAT SERPL-MCNC: 1.72 MG/DL (ref 0.55–1.02)
CREAT SERPL-MCNC: 1.87 MG/DL (ref 0.55–1.02)
DIAGNOSIS, 93000: NORMAL
DIFFERENTIAL METHOD BLD: ABNORMAL
EOSINOPHIL # BLD: 0 K/UL (ref 0–0.4)
EOSINOPHIL NFR BLD: 0 % (ref 0–7)
EPITH CASTS URNS QL MICRO: ABNORMAL /LPF
ERYTHROCYTE [DISTWIDTH] IN BLOOD BY AUTOMATED COUNT: 16 % (ref 11.5–14.5)
GLOBULIN SER CALC-MCNC: 2.9 G/DL (ref 2–4)
GLOBULIN SER CALC-MCNC: 3.1 G/DL (ref 2–4)
GLUCOSE BLD STRIP.AUTO-MCNC: 118 MG/DL (ref 65–100)
GLUCOSE BLD STRIP.AUTO-MCNC: 120 MG/DL (ref 65–100)
GLUCOSE BLD STRIP.AUTO-MCNC: 121 MG/DL (ref 65–100)
GLUCOSE BLD STRIP.AUTO-MCNC: 130 MG/DL (ref 65–100)
GLUCOSE BLD STRIP.AUTO-MCNC: 145 MG/DL (ref 65–100)
GLUCOSE BLD STRIP.AUTO-MCNC: 266 MG/DL (ref 65–100)
GLUCOSE BLD STRIP.AUTO-MCNC: 297 MG/DL (ref 65–100)
GLUCOSE BLD STRIP.AUTO-MCNC: 407 MG/DL (ref 65–100)
GLUCOSE BLD STRIP.AUTO-MCNC: 487 MG/DL (ref 65–100)
GLUCOSE BLD STRIP.AUTO-MCNC: 51 MG/DL (ref 65–100)
GLUCOSE BLD STRIP.AUTO-MCNC: 582 MG/DL (ref 65–100)
GLUCOSE BLD STRIP.AUTO-MCNC: 69 MG/DL (ref 65–100)
GLUCOSE BLD STRIP.AUTO-MCNC: 70 MG/DL (ref 65–100)
GLUCOSE BLD STRIP.AUTO-MCNC: 75 MG/DL (ref 65–100)
GLUCOSE BLD STRIP.AUTO-MCNC: 78 MG/DL (ref 65–100)
GLUCOSE BLD STRIP.AUTO-MCNC: 78 MG/DL (ref 65–100)
GLUCOSE BLD STRIP.AUTO-MCNC: 82 MG/DL (ref 65–100)
GLUCOSE BLD STRIP.AUTO-MCNC: 94 MG/DL (ref 65–100)
GLUCOSE BLD STRIP.AUTO-MCNC: 96 MG/DL (ref 65–100)
GLUCOSE BLD STRIP.AUTO-MCNC: >600 MG/DL (ref 65–100)
GLUCOSE SERPL-MCNC: 1125 MG/DL (ref 65–100)
GLUCOSE SERPL-MCNC: 228 MG/DL (ref 65–100)
GLUCOSE SERPL-MCNC: 60 MG/DL (ref 65–100)
GLUCOSE SERPL-MCNC: 636 MG/DL (ref 65–100)
GLUCOSE SERPL-MCNC: 73 MG/DL (ref 65–100)
GLUCOSE SERPL-MCNC: 782 MG/DL (ref 65–100)
GLUCOSE SERPL-MCNC: 968 MG/DL (ref 65–100)
GLUCOSE UR STRIP.AUTO-MCNC: >1000 MG/DL
HCT VFR BLD AUTO: 28.6 % (ref 35–47)
HGB BLD-MCNC: 9.6 G/DL (ref 11.5–16)
HGB UR QL STRIP: NEGATIVE
IMM GRANULOCYTES # BLD AUTO: 0.7 K/UL (ref 0–0.04)
IMM GRANULOCYTES NFR BLD AUTO: 4 % (ref 0–0.5)
KETONES UR QL STRIP.AUTO: 15 MG/DL
LACTATE SERPL-SCNC: 1.4 MMOL/L (ref 0.4–2)
LACTATE SERPL-SCNC: 3.3 MMOL/L (ref 0.4–2)
LACTATE SERPL-SCNC: 5.1 MMOL/L (ref 0.4–2)
LACTATE SERPL-SCNC: 6.7 MMOL/L (ref 0.4–2)
LACTATE SERPL-SCNC: 8.3 MMOL/L (ref 0.4–2)
LEUKOCYTE ESTERASE UR QL STRIP.AUTO: NEGATIVE
LYMPHOCYTES # BLD: 0.9 K/UL (ref 0.8–3.5)
LYMPHOCYTES NFR BLD: 5 % (ref 12–49)
MAGNESIUM SERPL-MCNC: 1.7 MG/DL (ref 1.6–2.4)
MAGNESIUM SERPL-MCNC: 1.8 MG/DL (ref 1.6–2.4)
MAGNESIUM SERPL-MCNC: 1.8 MG/DL (ref 1.6–2.4)
MAGNESIUM SERPL-MCNC: 2 MG/DL (ref 1.6–2.4)
MCH RBC QN AUTO: 31.5 PG (ref 26–34)
MCHC RBC AUTO-ENTMCNC: 33.6 G/DL (ref 30–36.5)
MCV RBC AUTO: 93.8 FL (ref 80–99)
MONOCYTES # BLD: 0.5 K/UL (ref 0–1)
MONOCYTES NFR BLD: 3 % (ref 5–13)
NEUTS SEG # BLD: 16.1 K/UL (ref 1.8–8)
NEUTS SEG NFR BLD: 88 % (ref 32–75)
NITRITE UR QL STRIP.AUTO: NEGATIVE
NRBC # BLD: 0.02 K/UL (ref 0–0.01)
NRBC BLD-RTO: 0.1 PER 100 WBC
P-R INTERVAL, ECG05: 168 MS
PH UR STRIP: 5 [PH] (ref 5–8)
PHOSPHATE SERPL-MCNC: 1.2 MG/DL (ref 2.6–4.7)
PHOSPHATE SERPL-MCNC: 1.4 MG/DL (ref 2.6–4.7)
PLATELET # BLD AUTO: 295 K/UL (ref 150–400)
PMV BLD AUTO: 9.9 FL (ref 8.9–12.9)
POTASSIUM SERPL-SCNC: 3.3 MMOL/L (ref 3.5–5.1)
POTASSIUM SERPL-SCNC: 3.4 MMOL/L (ref 3.5–5.1)
POTASSIUM SERPL-SCNC: 3.6 MMOL/L (ref 3.5–5.1)
POTASSIUM SERPL-SCNC: 3.6 MMOL/L (ref 3.5–5.1)
POTASSIUM SERPL-SCNC: 4.9 MMOL/L (ref 3.5–5.1)
PROCALCITONIN SERPL-MCNC: 1.43 NG/ML
PROT SERPL-MCNC: 5.2 G/DL (ref 6.4–8.2)
PROT SERPL-MCNC: 5.7 G/DL (ref 6.4–8.2)
PROT UR STRIP-MCNC: 30 MG/DL
Q-T INTERVAL, ECG07: 440 MS
QRS DURATION, ECG06: 96 MS
QTC CALCULATION (BEZET), ECG08: 475 MS
RBC # BLD AUTO: 3.05 M/UL (ref 3.8–5.2)
RBC #/AREA URNS HPF: ABNORMAL /HPF (ref 0–5)
RBC MORPH BLD: ABNORMAL
SERVICE CMNT-IMP: ABNORMAL
SERVICE CMNT-IMP: NORMAL
SODIUM SERPL-SCNC: 130 MMOL/L (ref 136–145)
SODIUM SERPL-SCNC: 137 MMOL/L (ref 136–145)
SODIUM SERPL-SCNC: 141 MMOL/L (ref 136–145)
SODIUM SERPL-SCNC: 142 MMOL/L (ref 136–145)
SODIUM SERPL-SCNC: 143 MMOL/L (ref 136–145)
SP GR UR REFRACTOMETRY: 1.02 (ref 1–1.03)
UA: UC IF INDICATED,UAUC: ABNORMAL
UROBILINOGEN UR QL STRIP.AUTO: 0.2 EU/DL (ref 0.2–1)
VENTRICULAR RATE, ECG03: 70 BPM
WBC # BLD AUTO: 18.2 K/UL (ref 3.6–11)
WBC URNS QL MICRO: ABNORMAL /HPF (ref 0–4)
YEAST URNS QL MICRO: PRESENT

## 2020-11-01 PROCEDURE — 74011250636 HC RX REV CODE- 250/636: Performed by: INTERNAL MEDICINE

## 2020-11-01 PROCEDURE — 74176 CT ABD & PELVIS W/O CONTRAST: CPT

## 2020-11-01 PROCEDURE — 36415 COLL VENOUS BLD VENIPUNCTURE: CPT

## 2020-11-01 PROCEDURE — 74011636637 HC RX REV CODE- 636/637: Performed by: INTERNAL MEDICINE

## 2020-11-01 PROCEDURE — 2709999900 HC NON-CHARGEABLE SUPPLY

## 2020-11-01 PROCEDURE — 82962 GLUCOSE BLOOD TEST: CPT

## 2020-11-01 PROCEDURE — 74011000250 HC RX REV CODE- 250: Performed by: GENERAL ACUTE CARE HOSPITAL

## 2020-11-01 PROCEDURE — 82947 ASSAY GLUCOSE BLOOD QUANT: CPT

## 2020-11-01 PROCEDURE — 74011000250 HC RX REV CODE- 250: Performed by: INTERNAL MEDICINE

## 2020-11-01 PROCEDURE — 81001 URINALYSIS AUTO W/SCOPE: CPT

## 2020-11-01 PROCEDURE — 74011000258 HC RX REV CODE- 258: Performed by: INTERNAL MEDICINE

## 2020-11-01 PROCEDURE — 80048 BASIC METABOLIC PNL TOTAL CA: CPT

## 2020-11-01 PROCEDURE — 74011250637 HC RX REV CODE- 250/637: Performed by: INTERNAL MEDICINE

## 2020-11-01 PROCEDURE — 65610000006 HC RM INTENSIVE CARE

## 2020-11-01 PROCEDURE — 84100 ASSAY OF PHOSPHORUS: CPT

## 2020-11-01 PROCEDURE — 74011250636 HC RX REV CODE- 250/636: Performed by: GENERAL ACUTE CARE HOSPITAL

## 2020-11-01 PROCEDURE — 3331090001 HH PPS REVENUE CREDIT

## 2020-11-01 PROCEDURE — 83605 ASSAY OF LACTIC ACID: CPT

## 2020-11-01 PROCEDURE — 74011000258 HC RX REV CODE- 258: Performed by: GENERAL ACUTE CARE HOSPITAL

## 2020-11-01 PROCEDURE — 74011636637 HC RX REV CODE- 636/637: Performed by: GENERAL ACUTE CARE HOSPITAL

## 2020-11-01 PROCEDURE — 3331090002 HH PPS REVENUE DEBIT

## 2020-11-01 PROCEDURE — 83735 ASSAY OF MAGNESIUM: CPT

## 2020-11-01 PROCEDURE — 84145 PROCALCITONIN (PCT): CPT

## 2020-11-01 PROCEDURE — 87086 URINE CULTURE/COLONY COUNT: CPT

## 2020-11-01 PROCEDURE — 80053 COMPREHEN METABOLIC PANEL: CPT

## 2020-11-01 PROCEDURE — 94760 N-INVAS EAR/PLS OXIMETRY 1: CPT

## 2020-11-01 PROCEDURE — 85025 COMPLETE CBC W/AUTO DIFF WBC: CPT

## 2020-11-01 RX ORDER — MAGNESIUM SULFATE 100 %
4 CRYSTALS MISCELLANEOUS AS NEEDED
Status: DISCONTINUED | OUTPATIENT
Start: 2020-11-01 | End: 2020-11-12 | Stop reason: SDUPTHER

## 2020-11-01 RX ORDER — DEXTROSE 50 % IN WATER (D50W) INTRAVENOUS SYRINGE
12.5-25 AS NEEDED
Status: DISCONTINUED | OUTPATIENT
Start: 2020-11-01 | End: 2020-11-12 | Stop reason: HOSPADM

## 2020-11-01 RX ORDER — INSULIN LISPRO 100 [IU]/ML
INJECTION, SOLUTION INTRAVENOUS; SUBCUTANEOUS EVERY 6 HOURS
Status: DISCONTINUED | OUTPATIENT
Start: 2020-11-01 | End: 2020-11-02

## 2020-11-01 RX ORDER — POTASSIUM CHLORIDE 29.8 MG/ML
20 INJECTION INTRAVENOUS ONCE
Status: COMPLETED | OUTPATIENT
Start: 2020-11-01 | End: 2020-11-02

## 2020-11-01 RX ORDER — NOREPINEPHRINE BITARTRATE/D5W 8 MG/250ML
.5-16 PLASTIC BAG, INJECTION (ML) INTRAVENOUS
Status: DISCONTINUED | OUTPATIENT
Start: 2020-11-01 | End: 2020-11-02

## 2020-11-01 RX ORDER — INSULIN GLARGINE 100 [IU]/ML
15 INJECTION, SOLUTION SUBCUTANEOUS
Status: COMPLETED | OUTPATIENT
Start: 2020-11-01 | End: 2020-11-01

## 2020-11-01 RX ORDER — DEXTROSE MONOHYDRATE AND SODIUM CHLORIDE 5; .45 G/100ML; G/100ML
100 INJECTION, SOLUTION INTRAVENOUS CONTINUOUS
Status: DISCONTINUED | OUTPATIENT
Start: 2020-11-01 | End: 2020-11-01

## 2020-11-01 RX ADMIN — SODIUM BICARBONATE: 84 INJECTION, SOLUTION INTRAVENOUS at 00:56

## 2020-11-01 RX ADMIN — DEXTROSE MONOHYDRATE AND SODIUM CHLORIDE 100 ML/HR: 5; .45 INJECTION, SOLUTION INTRAVENOUS at 12:01

## 2020-11-01 RX ADMIN — SODIUM CHLORIDE: 900 INJECTION, SOLUTION INTRAVENOUS at 18:20

## 2020-11-01 RX ADMIN — PIPERACILLIN AND TAZOBACTAM 3.38 G: 3; .375 INJECTION, POWDER, LYOPHILIZED, FOR SOLUTION INTRAVENOUS at 14:04

## 2020-11-01 RX ADMIN — Medication 10 ML: at 21:03

## 2020-11-01 RX ADMIN — DEXTROSE MONOHYDRATE 10 G: 25 INJECTION, SOLUTION INTRAVENOUS at 12:36

## 2020-11-01 RX ADMIN — SODIUM CHLORIDE 41.8 UNITS/HR: 9 INJECTION, SOLUTION INTRAVENOUS at 05:45

## 2020-11-01 RX ADMIN — INSULIN GLARGINE 15 UNITS: 100 INJECTION, SOLUTION SUBCUTANEOUS at 18:13

## 2020-11-01 RX ADMIN — PRAVASTATIN SODIUM 80 MG: 40 TABLET ORAL at 09:17

## 2020-11-01 RX ADMIN — Medication 10 ML: at 06:31

## 2020-11-01 RX ADMIN — SODIUM CHLORIDE 32.4 UNITS/HR: 9 INJECTION, SOLUTION INTRAVENOUS at 02:40

## 2020-11-01 RX ADMIN — VANCOMYCIN HYDROCHLORIDE 1750 MG: 100 INJECTION, POWDER, LYOPHILIZED, FOR SOLUTION INTRAVENOUS at 01:37

## 2020-11-01 RX ADMIN — Medication 1 AMPULE: at 20:54

## 2020-11-01 RX ADMIN — PIPERACILLIN AND TAZOBACTAM 3.38 G: 3; .375 INJECTION, POWDER, LYOPHILIZED, FOR SOLUTION INTRAVENOUS at 00:35

## 2020-11-01 RX ADMIN — DEXTROSE MONOHYDRATE 4.5 G: 25 INJECTION, SOLUTION INTRAVENOUS at 14:11

## 2020-11-01 RX ADMIN — CLOPIDOGREL BISULFATE 75 MG: 75 TABLET ORAL at 09:17

## 2020-11-01 RX ADMIN — SODIUM BICARBONATE: 84 INJECTION, SOLUTION INTRAVENOUS at 08:56

## 2020-11-01 RX ADMIN — SODIUM CHLORIDE 38.4 UNITS/HR: 9 INJECTION, SOLUTION INTRAVENOUS at 05:54

## 2020-11-01 RX ADMIN — ENOXAPARIN SODIUM 40 MG: 40 INJECTION SUBCUTANEOUS at 09:16

## 2020-11-01 RX ADMIN — Medication 1 AMPULE: at 09:11

## 2020-11-01 RX ADMIN — DEXTROSE MONOHYDRATE 6 G: 25 INJECTION, SOLUTION INTRAVENOUS at 12:53

## 2020-11-01 RX ADMIN — NOREPINEPHRINE BITARTRATE 1 MCG/MIN: 1 INJECTION, SOLUTION, CONCENTRATE INTRAVENOUS at 12:09

## 2020-11-01 RX ADMIN — PIPERACILLIN AND TAZOBACTAM 3.38 G: 3; .375 INJECTION, POWDER, LYOPHILIZED, FOR SOLUTION INTRAVENOUS at 21:02

## 2020-11-01 RX ADMIN — POTASSIUM CHLORIDE 20 MEQ: 400 INJECTION, SOLUTION INTRAVENOUS at 06:40

## 2020-11-01 RX ADMIN — PIPERACILLIN AND TAZOBACTAM 3.38 G: 3; .375 INJECTION, POWDER, LYOPHILIZED, FOR SOLUTION INTRAVENOUS at 06:47

## 2020-11-01 RX ADMIN — DEXTROSE MONOHYDRATE 4.5 G: 25 INJECTION, SOLUTION INTRAVENOUS at 11:21

## 2020-11-01 RX ADMIN — Medication 10 ML: at 01:38

## 2020-11-01 RX ADMIN — POTASSIUM CHLORIDE 20 MEQ: 400 INJECTION, SOLUTION INTRAVENOUS at 09:15

## 2020-11-01 RX ADMIN — Medication 40 ML: at 14:19

## 2020-11-01 RX ADMIN — Medication 1 AMPULE: at 01:39

## 2020-11-01 NOTE — ED TRIAGE NOTES
Pt came via EMS for hyperglycemia, Pt is lethargic but able to answer all questions. As per EMS Pt's CO2 was 8. EKG, blood work ordered as per protocol, Pt's POC BS is HI. Pt is on monitor, 93% on room air. DR. Gretchen Hunag at bedside    2215: Pt's rectal temp was 80 F. Dr. Gretchen Huang aware and she is put on bear hugger. Pt's MAP is in the 50s, Dr. Gretchen Huang aware, Pt is getting second bolus of fluids. 0045: TRANSFER - OUT REPORT:    Verbal report given to negrita(name) on Deana Noble  being transferred to 2526(unit) for routine progression of care       Report consisted of patients Situation, Background, Assessment and   Recommendations(SBAR). Information from the following report(s) SBAR, Kardex, ED Summary, Intake/Output, MAR, Recent Results and Cardiac Rhythm NSR was reviewed with the receiving nurse. Lines:   Triple Lumen 11/01/20 Right Internal jugular (Active)   Central Line Being Utilized Yes 11/01/20 0007   Site Assessment Clean, dry, & intact 11/01/20 0007   Infiltration Assessment 0 11/01/20 0007   Dressing Status Clean, dry, & intact 11/01/20 0007   Proximal Hub Color/Line Status White 11/01/20 0007   Positive Blood Return (Medial Site) Yes 11/01/20 0007   Medial Hub Color/Line Status Blue 11/01/20 0007   Positive Blood Return (Lateral Site) Yes 11/01/20 0007   Distal Hub Color/Line Status Brown 11/01/20 0007   Positive Blood Return (Site #3) Yes 11/01/20 0007       Peripheral IV 10/31/20 Left Antecubital (Active)       Peripheral IV 10/31/20 Right Antecubital (Active)        Opportunity for questions and clarification was provided.       Patient transported with:   Registered Nurse

## 2020-11-01 NOTE — PROGRESS NOTES
11/1/2020    INTENSIVIST PROGRESS NOTE:     Patient seen and evaluated, chart reviewed   68 yo female admitted to ccu with metabolic acidosis, lactic acidosis, DKA  Started on bicarb drip, insulin drip, abx  Now pt in CCU critically ill on pressors    ROS: unable to obtain    Visit Vitals  /64   Pulse 79   Temp 97.9 °F (36.6 °C)   Resp 17   Ht 5' 3\" (1.6 m)   Wt 60.6 kg (133 lb 9.6 oz)   SpO2 100%   BMI 23.67 kg/m²       General: critically ill  Eyes: anicteric  HEENT: dry oral mucosa  Neck: FROM  CV: RRR  Lungs: clear  Abd: soft  : no flank pain  Ext: no edema  Skin: no rash  Musculoskeletal: normal inspection  Neuro: lethargic    CXR: clear    Labs reviewed    A/P:  - DKA: IVF, insulin drip  - lactic acidosis: IVF, bicarb drip, CT ordered by hospitalist  - shock: IVF, pressors  - ONEYDA: IVF  - NPO  - PUD prophylaxis  - Will assist on disposition planning when stable for transfer  Debbie Nguyen MD   CC TIME 35 MINUTES

## 2020-11-01 NOTE — PROGRESS NOTES
[id number]: Daylight Savings /40 59, 76 NSR RR 20 and O2 saturations 100%. 150 UO past hour. 1483: Notified Dr. Adriane Greene of morning lab results. New orders obtained. 0600: Will wait for family to answer some questions on admission database to assure accuracy.

## 2020-11-01 NOTE — PROGRESS NOTES
Pharmacy Automatic Renal Dosing Protocol - Antimicrobials    Indication for Antimicrobials: Sepsis     Current Regimen of Each Antimicrobial:  Vancmycin 1750mg  IV loadiing dose then 650mg IV q24h - started  day 1  Zosyn 3.375gm IV q8h - started  day 1    Previous Antimicrobial Therapy:  None    Vancomycin  Trough Goal Level:   15-20 (AUC: 400 - 600 mg/hr/Liter/day)     Date Dose & Interval Measured (mcg/mL) Extrapolated (mcg/mL)   11/3 00:30 650mg IV q24h                   Significant Cultures:   10/31 PBCx - pending    Radiology / Imaging results: (X-ray, CT scan or MRI): -    Paralysis, amputations, malnutrition: none    Labs:  Recent Labs     10/31/20  2104 10/29/20  2110   CREA 1.81* 1.05*   BUN 35* 15   WBC 13.5* 2.8*     Temp (24hrs), Av.3 °F (34.1 °C), Min:92.5 °F (33.6 °C), Max:94 °F (34.4 °C)      Is the Patient on Dialysis? No    Creatinine Clearance (mL/min):   CrCl (Actual Body Weight): 24.5  CrCl (Adjusted Body Weight): 22.5  CrCl (Ideal Body Weight): 21.2    Impression/Plan:   Vancomycin 1750mg IV loading dose then 650mg IV q24h for a projected trough at Css of 16-17; will plan to order trough level before 11/3 am dose  Zosyn 3.375gm IV q8h  BMP and CBC ordered daily  Antimicrobial stop date - pending     Pharmacy will follow daily and adjust medications as appropriate for renal function and/or serum levels.     Thank you,  Ousmane Clay, [facility]

## 2020-11-01 NOTE — H&P
Hospitalist Admission Note    NAME: Pearl Lopez   :  1940   MRN:  [id number]     Date/Time:  2020 12:00 AM    Patient PCP: Magi Hayes MD  ______________________________________________________________________  Given the patient's current clinical presentation, I have a high level of concern for decompensation if discharged from the emergency department. Complex decision making was performed, which includes reviewing the patient's available past medical records, laboratory results, and x-ray films. My assessment of this patient's clinical condition and my plan of care is as follows. Assessment / Plan:    DKA, POA  Hypovolemic versus septic shock, POA  Severe dehydration, POA  Pseudohyponatremia, POA  Acute kidney injury, POA  Severe high anion gap metabolic acidosis, POA  Acute on chronic metabolic encephalopathy, POA  Blood sugar around 1200, pH 7.01 meeting criteria for severe DKA  Starting DKA protocol  Started on bicarb drip  Blood pressure dropped, was given normal saline bolus 30 cc/kg in the ED with no response  Levophed was started through the triple-lumen catheter on the right IJ  Maintain mean arterial pressure more than 65 mmHg  Not clear if it is hypovolemic versus septic shock  X-ray with no evidence of airspace disease, UA with no pyuria  Start broad-spectrum antibiotics  Follow procalcitonin and adjust accordingly  Follow-up blood cultures  ICU admission  Follow-up with lactic acid levels  Creatinine 1.81 on admission likely prerenal from dehydration  Follow BMP daily  Continue IV fluids    History of CVA  History of dementia  Continue home meds as tolerated    Hypertension  Hold blood pressure medications right now given blood pressure on the low side    History of thyroidectomy  Chronically on Synthroid, continue    Code Status: full code  Surrogate Decision Maker: Her daughter Roosevelt Farfan.   Roosevelt Farfan was called and updated on the plan, all questions were answered. DVT Prophylaxis: Lovenox adjusted dose  GI Prophylaxis: not indicated    Baseline: Active, intermittent as per her daughter      Subjective:   CHIEF COMPLAINT: Elevated blood sugar    HISTORY OF PRESENT ILLNESS:       The patient is a 67years old woman with past medical history of diabetes mellitus, hypertension, CVA, dementia presented to emergency department due to elevated blood sugar around 1200. No family at bedside and patient was unable to provide history. History was obtained from the EMR and the ER physician. Upon arrival to the ER, patient was found to be in DKA for which she was started on DKA protocol. Her blood pressure dropped during her stay in the ED for which she was given IV fluid bolus 30 cc/kg with no response so she was started on IV vasopressors through triple-lumen catheter in the right IJ was placed by the ED physician. As per her daughter Colten patient was normal however she thinks that she forgot to take her insulin and her blood sugar was elevated for today prior to the hospital.    We were asked to admit for work up and evaluation of the above problems. Past Medical History:   Diagnosis Date    Diabetes ([facility] [address]     Heart failure ([facility] [address]     unknown to family    Hypertension     Stroke [facility])         Past Surgical History:   Procedure Laterality Date    HX GYN      HX HEENT      thyroidectomy    HX HYSTERECTOMY      REMOVAL GALLBLADDER      THYROIDECTOMY         Social History     Tobacco Use    Smoking status: Never Smoker    Smokeless tobacco: Never Used   Substance Use Topics    Alcohol use: No     Comment: been years        Family History   Problem Relation Age of Onset    Diabetes Father     No Known Problems Mother      No Known Allergies     Prior to Admission medications    Medication Sig Start Date End Date Taking?  Authorizing Provider   insulin lispro (HUMALOG) 100 unit/mL kwikpen 2 units before breakfast and lunch only 10/25/20  Yes Cordell Sheppard MD   amLODIPine (NORVASC) 10 mg tablet Take 1 Tab by mouth daily. 10/5/20  Yes Cordell Sheppard MD   metoprolol succinate (TOPROL-XL) 25 mg XL tablet TAKE 1 TABLET BY MOUTH EVERY DAY 5/11/20  Yes Cordell Sheppard MD   pravastatin (PRAVACHOL) 80 mg tablet TAKE 1 TABLET BY MOUTH at bedtime 2/27/20  Yes Cordell Sheppard MD   levothyroxine (SYNTHROID) 175 mcg tablet TAKE 1 TABLET BY MOUTH EVERY DAY 2/27/20  Yes Cordell Sheppard MD   clopidogreL (PLAVIX) 75 mg tab TAKE 1 TABLET BY MOUTH EVERY DAY 2/27/20  Yes Cordell Sheppard MD   insulin degludec Natalie Sa FlexTouch U-100) 100 unit/mL (3 mL) inpn 12 Units by SubCUTAneous route daily. 10/25/20   Cordell Sheppard MD   zinc oxide 20 % ointment Apply 1 Applicator to affected area two (2) times a day. thin layer gluteal cleft    Provider, Historical   losartan-hydroCHLOROthiazide (HYZAAR) 100-25 mg per tablet Take 1 Tab by mouth daily. Provider, Historical   brimonidine (ALPHAGAN) 0.15 % ophthalmic solution Administer 1 Drop to both eyes two (2) times a day. 1/30/15   Provider, Historical       REVIEW OF SYSTEMS:     I am not able to complete the review of systems because:    The patient is intubated and sedated   x The patient has altered mental status due to his acute medical problems    The patient has baseline aphasia from prior stroke(s)   x The patient has baseline dementia and is not reliable historian    The patient is in acute medical distress and unable to provide information           Total of 12 systems reviewed as follows:       POSITIVE= underlined text  Negative = text not underlined  General:  fever, chills, sweats, generalized weakness, weight loss/gain,      loss of appetite   Eyes:    blurred vision, eye pain, loss of vision, double vision  ENT:    rhinorrhea, pharyngitis   Respiratory:   cough, sputum production, SOB, AGUILERA, wheezing, pleuritic pain   Cardiology:   chest pain, palpitations, orthopnea, PND, edema, syncope Gastrointestinal:  abdominal pain , N/V, diarrhea, dysphagia, constipation, bleeding   Genitourinary:  frequency, urgency, dysuria, hematuria, incontinence   Muskuloskeletal :  arthralgia, myalgia, back pain  Hematology:  easy bruising, nose or gum bleeding, lymphadenopathy   Dermatological: rash, ulceration, pruritis, color change / jaundice  Endocrine:   hot flashes or polydipsia   Neurological:  headache, dizziness, confusion, focal weakness, paresthesia,     Speech difficulties, memory loss, gait difficulty  Psychological: Feelings of anxiety, depression, agitation    Objective:   VITALS:    Visit Vitals  BP (!) 89/52   Pulse 65   Temp (!) 92.5 °F (33.6 °C)   Resp 18   Ht 5' 3\" (1.6 m)   Wt 60.6 kg (133 lb 9.6 oz)   SpO2 100%   BMI 23.67 kg/m²       PHYSICAL EXAM:    General:    Alert, cooperative, no distress, appears stated age. HEENT: Atraumatic, anicteric sclerae, pink conjunctivae     No oral ulcers, mucosa dry, throat clear, dentition fair  Neck:  Supple, symmetrical,  thyroid: non tender  Lungs:   Clear to auscultation bilaterally. No Wheezing or Rhonchi. No rales. Chest wall:  No tenderness  No Accessory muscle use. Heart:   Regular  rhythm,  No  murmur   No edema  Abdomen:   Soft, non-tender. Not distended. Bowel sounds normal  Extremities: No cyanosis. No clubbing,      Skin turgor normal, Capillary refill normal, Radial dial pulse 2+  Skin:     Not pale. Not Jaundiced  No rashes   Psych:  Poor insight. Not depressed. Not anxious or agitated. Neurologic: EOMs intact. No facial asymmetry. No aphasia or slurred speech. Symmetrical strength, Sensation grossly intact. Alert and oriented X 0 at her baseline as per her daughter Beatriz Byers.      _______________________________________________________________________  Care Plan discussed with:    Comments   Patient x    Family  x  patient's daughter Rupali Kirkland x    Care Manager                    Consultant:  x  ED physician _______________________________________________________________________  Expected  Disposition:   Home with Family    HH/PT/OT/RN    SNF/LTC x   TAYA    ________________________________________________________________________  TOTAL TIME:  75 Minutes    Critical Care Provided   35  Minutes non procedure based      Comments    x Reviewed previous records   >50% of visit spent in counseling and coordination of care x Discussion with patient and/or family and questions answered       ________________________________________________________________________  Signed: Ailyn Aburto MD    Procedures: see electronic medical records for all procedures/Xrays and details which were not copied into this note but were reviewed prior to creation of Plan. LAB DATA REVIEWED:    Recent Results (from the past 24 hour(s))   GLUCOSE, POC    Collection Time: 10/31/20  8:40 PM   Result Value Ref Range    Glucose (POC) >600 (HH) 65 - 100 mg/dL    Performed by Michelle Browning    GLUCOSE, POC    Collection Time: 10/31/20  8:43 PM   Result Value Ref Range    Glucose (POC) >600 (HH) 65 - 100 mg/dL    Performed by Michelle Browning    EKG, 12 LEAD, INITIAL    Collection Time: 10/31/20  8:47 PM   Result Value Ref Range    Ventricular Rate 70 BPM    Atrial Rate 70 BPM    P-R Interval 168 ms    QRS Duration 96 ms    Q-T Interval 440 ms    QTC Calculation (Bezet) 475 ms    Calculated P Axis 47 degrees    Calculated R Axis -38 degrees    Calculated T Axis -33 degrees    Diagnosis       Normal sinus rhythm with sinus arrhythmia  Left axis deviation  Anterior infarct (cited on or before 31-OCT-2020)  When compared with ECG of 20-OCT-2020 17:34,  Vent.  rate has decreased BY  34 BPM     METABOLIC PANEL, COMPREHENSIVE    Collection Time: 10/31/20  9:04 PM   Result Value Ref Range    Sodium 126 (L) 136 - 145 mmol/L    Potassium 5.9 (H) 3.5 - 5.1 mmol/L    Chloride 91 (L) 97 - 108 mmol/L    CO2 <5 (LL) 21 - 32 mmol/L    Anion gap Cannot be calculated 5 - 15 mmol/L    Glucose 1,214 (HH) 65 - 100 mg/dL    BUN 35 (H) 6 - 20 MG/DL    Creatinine 1.81 (H) 0.55 - 1.02 MG/DL    BUN/Creatinine ratio 19 12 - 20      GFR est AA 33 (L) >60 ml/min/1.73m2    GFR est non-AA 27 (L) >60 ml/min/1.73m2    Calcium 8.7 8.5 - 10.1 MG/DL    Bilirubin, total 0.5 0.2 - 1.0 MG/DL    ALT (SGPT) 190 (H) 12 - 78 U/L    AST (SGOT) 227 (H) 15 - 37 U/L    Alk. phosphatase 302 (H) 45 - 117 U/L    Protein, total 6.4 6.4 - 8.2 g/dL    Albumin 3.0 (L) 3.5 - 5.0 g/dL    Globulin 3.4 2.0 - 4.0 g/dL    A-G Ratio 0.9 (L) 1.1 - 2.2     CBC WITH AUTOMATED DIFF    Collection Time: 10/31/20  9:04 PM   Result Value Ref Range    WBC 13.5 (H) 3.6 - 11.0 K/uL    RBC 3.20 (L) 3.80 - 5.20 M/uL    HGB 9.9 (L) 11.5 - 16.0 g/dL    HCT 34.0 (L) 35.0 - 47.0 %    .3 (H) 80.0 - 99.0 FL    MCH 30.9 26.0 - 34.0 PG    MCHC 29.1 (L) 30.0 - 36.5 g/dL    RDW 17.7 (H) 11.5 - 14.5 %    PLATELET 219 894 - 038 K/uL    MPV 10.4 8.9 - 12.9 FL    NRBC 0.1 (H) 0  WBC    ABSOLUTE NRBC 0.02 (H) 0.00 - 0.01 K/uL    NEUTROPHILS 72 32 - 75 %    BAND NEUTROPHILS 5 %    LYMPHOCYTES 14 12 - 49 %    MONOCYTES 4 (L) 5 - 13 %    EOSINOPHILS 0 0 - 7 %    BASOPHILS 0 0 - 1 %    METAMYELOCYTES 2 %    MYELOCYTES 3 %    IMMATURE GRANULOCYTES 0 0.0 - 0.5 %    ABS. NEUTROPHILS 10.4 (H) 1.8 - 8.0 K/UL    ABS. LYMPHOCYTES 1.9 0.8 - 3.5 K/UL    ABS. MONOCYTES 0.5 0.0 - 1.0 K/UL    ABS. EOSINOPHILS 0.0 0.0 - 0.4 K/UL    ABS. BASOPHILS 0.0 0.0 - 0.1 K/UL    ABS. IMM.  GRANS. 0.0 0.00 - 0.04 K/UL    DF MANUAL      RBC COMMENTS MACROCYTOSIS  1+        RBC COMMENTS ANISOCYTOSIS  1+        RBC COMMENTS EDIL CELLS  2+       LIPASE    Collection Time: 10/31/20  9:04 PM   Result Value Ref Range    Lipase 31 (L) 73 - 393 U/L   MAGNESIUM    Collection Time: 10/31/20  9:04 PM   Result Value Ref Range    Magnesium 2.4 1.6 - 2.4 mg/dL   PHOSPHORUS    Collection Time: 10/31/20  9:04 PM   Result Value Ref Range    Phosphorus 7.3 (H) 2.6 - 4.7 MG/DL   TROPONIN I Collection Time: 10/31/20  9:04 PM   Result Value Ref Range    Troponin-I, Qt. <0.05 <0.05 ng/mL   POC EG7    Collection Time: 10/31/20  9:12 PM   Result Value Ref Range    Calcium, ionized (POC) 1.12 1.12 - 1.32 mmol/L    pH (POC) 7.01 (LL) 7.35 - 7.45      pCO2 (POC) <15.0 (L) 35.0 - 45.0 MMHG    pO2 (POC) 92 80 - 100 MMHG    Site RIGHT RADIAL      Device: ROOM AIR      Allens test (POC) NO      Specimen type (POC) VENOUS BLOOD     GLUCOSE, POC    Collection Time: 10/31/20 11:58 PM   Result Value Ref Range    Glucose (POC) >600 (HH) 65 - 100 mg/dL    Performed by Cynthia Stanley

## 2020-11-01 NOTE — PROGRESS NOTES
Anticoagulation Dosing    Indication:  DVT ppx    Estimated Creatinine Clearance: 21.2 mL/min (A) (based on SCr of 1.81 mg/dL (H)). Estimated Creatinine Clearance (using IBW):21.2 mL/min    Recent Labs     10/31/20  2104 10/29/20  2110   CREA 1.81* 1.05*   ALB 3.0* 2.9*   HGB 9.9* 11.1*   HCT 34.0* 33.0*    273         Wt Readings from Last 1 Encounters:   10/31/20 60.6 kg (133 lb 9.6 oz)     Ht Readings from Last 1 Encounters:   10/31/20 160 cm (63\")     Body mass index is 23.67 kg/m².     Plan: Enoxaparin 30mg SQ q24h adjusted to Enoxaparin 40mg SQ q24h due to patient's renal function and wt > 60kg     Thank you,  Dary Flores, [facility]    Dosing of VTE prophylaxis in patients without a history of heparin induced thrombocytopenia     Renal Function based on Creatinine Clearance (Ideal Body Weight)    CrCl ? 30 ml/min CrCl 20-29 ml/min CrCl <20 or HD   Low weight patients  < 60 kg   Heparin 5000 units subcutaneously every 12 hours   Normal weight patients Enoxaparin 40 mg subcutaneously every 24 hours     or    Enoxaparin 30 mg subcutaneously every 12 hours*    or    Heparin 5000 units subcutaneously every 8 hours Enoxaparin 30 mg subcutaneously every 24 hours    or    Heparin 5000 units subcutaneously every 8 hours Enoxaparin contraindicated           Heparin 5000 units subcutaneously every 8 hours**   Obese patients  BMI >40 Enoxaparin 40 mg subcutaneously every 12 hours    or    Heparin 7500 units subcutaneously every 8 hours Enoxaparin 40 mg subcutaneously every 24 hours    or    Heparin 7500 units subcutaneously every 8 hours Enoxaparin contraindicated           Heparin 7500 units subcutaneously every 8 hours**   * Dosing regimen for knee/hip replacement surgery  ** Every 12 hour dosing may be considered if requested by nephrology

## 2020-11-01 NOTE — ED PROVIDER NOTES
EMERGENCY DEPARTMENT HISTORY AND PHYSICAL EXAM      Date: 10/31/2020  Patient Name: Ashley Phillips    History of Presenting Illness     Chief Complaint   Patient presents with    High Blood Sugar     Pt came via EMS for high Blood Sugar. History Provided By: Patient and EMS    HPI: Ashley Phillips, 67 y.o. female presents to the ED with cc of high blood sugar and confusion. History limited from patient. Per EMS, patient is well-known with multiple admissions for DKA, most recently in the ICU and discharge. Patient was found in her home in disarray. Therefore she did receive 20 units of insulin. Patient glucose registered high on their meter. Patient has no complaints. There are no other complaints, changes, or physical findings at this time. PCP: Edwin De La Torre MD    No current facility-administered medications on file prior to encounter. Current Outpatient Medications on File Prior to Encounter   Medication Sig Dispense Refill    insulin lispro (HUMALOG) 100 unit/mL kwikpen 2 units before breakfast and lunch only 2 Adjustable Dose Pre-filled Pen Syringe 11    amLODIPine (NORVASC) 10 mg tablet Take 1 Tab by mouth daily. 30 Tab 11    metoprolol succinate (TOPROL-XL) 25 mg XL tablet TAKE 1 TABLET BY MOUTH EVERY DAY 90 Tab 3    pravastatin (PRAVACHOL) 80 mg tablet TAKE 1 TABLET BY MOUTH at bedtime 90 Tab 3    levothyroxine (SYNTHROID) 175 mcg tablet TAKE 1 TABLET BY MOUTH EVERY DAY 90 Tab 3    clopidogreL (PLAVIX) 75 mg tab TAKE 1 TABLET BY MOUTH EVERY DAY 90 Tab 3    insulin degludec Deepika Rohit FlexTouch U-100) 100 unit/mL (3 mL) inpn 12 Units by SubCUTAneous route daily. 2 Adjustable Dose Pre-filled Pen Syringe 11    zinc oxide 20 % ointment Apply 1 Applicator to affected area two (2) times a day. thin layer gluteal cleft      losartan-hydroCHLOROthiazide (HYZAAR) 100-25 mg per tablet Take 1 Tab by mouth daily.       brimonidine (ALPHAGAN) 0.15 % ophthalmic solution Administer 1 Drop to both eyes two (2) times a day. Past History     Past Medical History:  Past Medical History:   Diagnosis Date    Diabetes ([facility]     Heart failure ([facility]     unknown to family    Hypertension     Stroke [facility])        Past Surgical History:  Past Surgical History:   Procedure Laterality Date    HX GYN      HX HEENT      thyroidectomy    HX HYSTERECTOMY      REMOVAL GALLBLADDER      THYROIDECTOMY         Family History:  Family History   Problem Relation Age of Onset    Diabetes Father     No Known Problems Mother        Social History:  Social History     Tobacco Use    Smoking status: Never Smoker    Smokeless tobacco: Never Used   Substance Use Topics    Alcohol use: No     Comment: been years    Drug use: No       Allergies:  No Known Allergies      Review of Systems   Review of Systems   Unable to perform ROS: Mental status change       Physical Exam   Physical Exam  Vitals signs and nursing note reviewed. Constitutional:       Comments: 45-year-old female, resting in stretcher, appears chronically ill   HENT:      Head: Normocephalic and atraumatic. Comments: Temporal wasting appreciated  Neck:      Musculoskeletal: Normal range of motion. Cardiovascular:      Rate and Rhythm: Normal rate and regular rhythm. Heart sounds: No murmur. No friction rub. No gallop. Pulmonary:      Breath sounds: Normal breath sounds. Comments: Tachypnea and Kussmaul respirations noted  Abdominal:      Palpations: Abdomen is soft. Tenderness: There is no abdominal tenderness. Musculoskeletal: Normal range of motion. Skin:     General: Skin is warm. Capillary Refill: Capillary refill takes less than 2 seconds. Neurological:      General: No focal deficit present. Mental Status: She is alert.    Psychiatric:         Mood and Affect: Mood normal.         Diagnostic Study Results     Labs -     Recent Results (from the past 12 hour(s))   GLUCOSE, POC    Collection Time: 10/31/20  8:40 PM   Result Value Ref Range    Glucose (POC) >600 (HH) 65 - 100 mg/dL    Performed by José Arriaza    GLUCOSE, POC    Collection Time: 10/31/20  8:43 PM   Result Value Ref Range    Glucose (POC) >600 (HH) 65 - 100 mg/dL    Performed by José Arriaza    EKG, 12 LEAD, INITIAL    Collection Time: 10/31/20  8:47 PM   Result Value Ref Range    Ventricular Rate 70 BPM    Atrial Rate 70 BPM    P-R Interval 168 ms    QRS Duration 96 ms    Q-T Interval 440 ms    QTC Calculation (Bezet) 475 ms    Calculated P Axis 47 degrees    Calculated R Axis -38 degrees    Calculated T Axis -33 degrees    Diagnosis       Normal sinus rhythm with sinus arrhythmia  Left axis deviation  Anterior infarct (cited on or before 31-OCT-2020)  When compared with ECG of 20-OCT-2020 17:34,  Vent. rate has decreased BY  34 BPM     METABOLIC PANEL, COMPREHENSIVE    Collection Time: 10/31/20  9:04 PM   Result Value Ref Range    Sodium 126 (L) 136 - 145 mmol/L    Potassium 5.9 (H) 3.5 - 5.1 mmol/L    Chloride 91 (L) 97 - 108 mmol/L    CO2 <5 (LL) 21 - 32 mmol/L    Anion gap Cannot be calculated 5 - 15 mmol/L    Glucose 1,214 (HH) 65 - 100 mg/dL    BUN 35 (H) 6 - 20 MG/DL    Creatinine 1.81 (H) 0.55 - 1.02 MG/DL    BUN/Creatinine ratio 19 12 - 20      GFR est AA 33 (L) >60 ml/min/1.73m2    GFR est non-AA 27 (L) >60 ml/min/1.73m2    Calcium 8.7 8.5 - 10.1 MG/DL    Bilirubin, total 0.5 0.2 - 1.0 MG/DL    ALT (SGPT) 190 (H) 12 - 78 U/L    AST (SGOT) 227 (H) 15 - 37 U/L    Alk.  phosphatase 302 (H) 45 - 117 U/L    Protein, total 6.4 6.4 - 8.2 g/dL    Albumin 3.0 (L) 3.5 - 5.0 g/dL    Globulin 3.4 2.0 - 4.0 g/dL    A-G Ratio 0.9 (L) 1.1 - 2.2     CBC WITH AUTOMATED DIFF    Collection Time: 10/31/20  9:04 PM   Result Value Ref Range    WBC 13.5 (H) 3.6 - 11.0 K/uL    RBC 3.20 (L) 3.80 - 5.20 M/uL    HGB 9.9 (L) 11.5 - 16.0 g/dL    HCT 34.0 (L) 35.0 - 47.0 %    .3 (H) 80.0 - 99.0 FL    MCH 30.9 26.0 - 34.0 PG    MCHC 29.1 (L) 30.0 - 36.5 g/dL RDW 17.7 (H) 11.5 - 14.5 %    PLATELET 291 631 - 836 K/uL    MPV 10.4 8.9 - 12.9 FL    NRBC 0.1 (H) 0  WBC    ABSOLUTE NRBC 0.02 (H) 0.00 - 0.01 K/uL    NEUTROPHILS 72 32 - 75 %    BAND NEUTROPHILS 5 %    LYMPHOCYTES 14 12 - 49 %    MONOCYTES 4 (L) 5 - 13 %    EOSINOPHILS 0 0 - 7 %    BASOPHILS 0 0 - 1 %    METAMYELOCYTES 2 %    MYELOCYTES 3 %    IMMATURE GRANULOCYTES 0 0.0 - 0.5 %    ABS. NEUTROPHILS 10.4 (H) 1.8 - 8.0 K/UL    ABS. LYMPHOCYTES 1.9 0.8 - 3.5 K/UL    ABS. MONOCYTES 0.5 0.0 - 1.0 K/UL    ABS. EOSINOPHILS 0.0 0.0 - 0.4 K/UL    ABS. BASOPHILS 0.0 0.0 - 0.1 K/UL    ABS. IMM. GRANS. 0.0 0.00 - 0.04 K/UL    DF MANUAL      RBC COMMENTS MACROCYTOSIS  1+        RBC COMMENTS ANISOCYTOSIS  1+        RBC COMMENTS EDIL CELLS  2+       LIPASE    Collection Time: 10/31/20  9:04 PM   Result Value Ref Range    Lipase 31 (L) 73 - 393 U/L   MAGNESIUM    Collection Time: 10/31/20  9:04 PM   Result Value Ref Range    Magnesium 2.4 1.6 - 2.4 mg/dL   PHOSPHORUS    Collection Time: 10/31/20  9:04 PM   Result Value Ref Range    Phosphorus 7.3 (H) 2.6 - 4.7 MG/DL   TROPONIN I    Collection Time: 10/31/20  9:04 PM   Result Value Ref Range    Troponin-I, Qt. <0.05 <0.05 ng/mL   POC EG7    Collection Time: 10/31/20  9:12 PM   Result Value Ref Range    Calcium, ionized (POC) 1.12 1.12 - 1.32 mmol/L    pH (POC) 7.01 (LL) 7.35 - 7.45      pCO2 (POC) <15.0 (L) 35.0 - 45.0 MMHG    pO2 (POC) 92 80 - 100 MMHG    Site RIGHT RADIAL      Device: ROOM AIR      Allens test (POC) NO      Specimen type (POC) VENOUS BLOOD     GLUCOSE, POC    Collection Time: 10/31/20 11:58 PM   Result Value Ref Range    Glucose (POC) >600 (HH) 65 - 100 mg/dL    Performed by Asim Lane        Radiologic Studies -   XR CHEST PORT   Final Result   IMPRESSION:      No pneumothorax following right IJ catheter placement. Clear lungs. XR CHEST PORT   Final Result   IMPRESSION: No acute cardiopulmonary process. Unchanged cardiomegaly. CT Results  (Last 48 hours)               10/30/20 0018  CT HEAD WO CONT Final result    Impression:  IMPRESSION:    No acute intracranial process. Imaging findings consistent with moderate chronic microvascular ischemic change. There is a moderate to severe degree of cerebral atrophy. Narrative:  CLINICAL HISTORY: fall   INDICATION: fall   COMPARISON: 10/20/2020. CT dose reduction was achieved through use of a standardized protocol tailored   for this examination and automatic exposure control for dose modulation. TECHNIQUE: Serial axial images with a collimation of 5 mm were obtained from the   skull base through the vertex     FINDINGS:    There is sulcal and ventricular prominence. Confluent periventricular and   scattered foci of hypodensity in the cerebral white matter. There is no evidence   of an acute infarction, hemorrhage, or mass-effect. There is no evidence of   midline shift or hydrocephalus. Posterior fossa structures are unremarkable. No   extra-axial collections are seen. Mastoid air cells are well pneumatized and clear. There is no evidence of depressed skull fractures of soft tissue swelling. 10/30/20 0018  CT SPINE CERV WO CONT Final result    Impression:  IMPRESSION:   There is no acute fracture or dislocation identified. Left-sided pleural effusion. Narrative:  EXAM: CT SPINE CERV WO CONT   CLINICAL HISTORY: fall   INDICATION: fall   COMPARISON:  None   TECHNIQUE:  Axial neck CT was performed. Noncontrast imaging obtained. Coronal   and sagittal reconstructions were performed. CT dose reduction was achieved through use of a standardized protocol tailored   for this examination and automatic exposure control for dose modulation. Osseous/bone algorithm was utilized. FINDINGS:   The vertebral bodies are anatomically aligned. There is no evidence of fracture   or subluxation.  The prevertebral soft tissues are grossly within normal limits. The atlantodental interval is within normal limits. The craniocervical junction   is intact. Left-sided pleural effusion. No pneumothorax. Canal and foraminal stenoses of   the cervical spine. CXR Results  (Last 48 hours)               10/31/20 2343  XR CHEST PORT Final result    Impression:  IMPRESSION:       No pneumothorax following right IJ catheter placement. Clear lungs. Narrative:  EXAM:  XR CHEST PORT       INDICATION: Central line placement       COMPARISON: 10/31/2020 at 2108 hours       TECHNIQUE: Portable AP semiupright chest view at 2337 hours       FINDINGS: A right IJ catheter terminates in profile with the SVC. Cardiac   monitoring wires overlie the thorax. The cardiomediastinal contours are stable. The pulmonary vasculature is within normal limits. The lungs and pleural spaces are clear. There is no pneumothorax. The bones and   upper abdomen are stable. 10/31/20 2113  XR CHEST PORT Final result    Impression:  IMPRESSION: No acute cardiopulmonary process. Unchanged cardiomegaly. Narrative:  EXAM:  XR CHEST PORT       INDICATION:   DKA       COMPARISON: Chest radiograph 10/20/2020. FINDINGS: AP radiograph of the chest was obtained. No evidence of focal consolidation. No pleural effusion or pneumothorax. Unchanged cardiomegaly. No acute osseous abnormalities. Unchanged chronic   fracture deformity of the left humeral head. Medical Decision Making   I am the first provider for this patient. I reviewed the vital signs, available nursing notes, past medical history, past surgical history, family history and social history. Vital Signs-Reviewed the patient's vital signs.   Patient Vitals for the past 12 hrs:   Temp Pulse Resp BP SpO2   10/31/20 2350  65 18 (!) 89/52 100 %   10/31/20 2319 (!) 92.5 °F (33.6 °C) 65 22 (!) 81/39 99 %   10/31/20 2245  67 22 (!) 78/36 97 %   10/31/20 2205 (!) 94 °F (34.4 °C) 69 20 (!) 87/37 98 %   10/31/20 2036  71 18 (!) 106/48 98 %     Records Reviewed: Nursing Notes, Old Medical Records, Previous Radiology Studies and Previous Laboratory Studies    Provider Notes (Medical Decision Making):     81-year-old female presents emergency department with a chief complaint of elevated blood glucose. Suspected DKA. Patient's initial VBG shows a pH of 7.01 with a bicarb of less than 15. Will screen for infectious causes however based on history, there may be an element of medication noncompliance due to patient social situation. Doubt central cause, she has no focal deficits, suspect her encephalopathy is secondary to toxic metabolic causes. ED Course:   Initial assessment performed. The patients presenting problems have been discussed, and they are in agreement with the care plan formulated and outlined with them. I have encouraged them to ask questions as they arise throughout their visit. ED Course as of Nov 01 0007   Sat Oct 31, 2020   2107 Preliminary EKG interpreted by me. Shows sinus rhythm with a HR of 70. No ST elevations or depressions concerning for ischemia. Normal intervals. [MB]   2127 pH (POC)(!!): 7.01 [MB]   2156 Patient has a mild leukocytosis but is afebrile here. Chest x-ray shows no acute pathology. Awaiting urine. Suspect etiology is noncompliance in the setting of patient's dementia. Patient's BMP is notable for a high anion gap metabolic acidosis consistent with diabetic ketoacidosis as her sugar is elevated. She has a mild ONEYDA likely prerenal.    [MB]   2157 Patient will be bolused 2 L. She will be started on maintenance half-normal saline, her potassium is appropriate at 5.9 to begin the insulin drip. [MB]   2158 Patient will be discussed with hospitalist for ICU admission. [MB]   9619 Patient hypothermic and placed on Marlyn hugger. She is also mildly hypotensive. Given her acidosis, I will give an amp of bicarb. Continue bolusing fluids. [MB]   2237 Spoke to patient's daughter and POA, patient will eventually be moving in with her as patient is unable to care for herself. I discussed possible central line placement, as well as CODE STATUS. At this time she will be full code as patient's POA desires her to live with her at home. [MB]   5478 Patient's blood pressure declining in the 76P systolic, will place a central line. Give 100 mcg phenylephrine and mix Levophed. [MB]   4682 Patient reassessed, blood pressure improving on Levophed drip. Will start bicarb drip as well. [MB]   Sun Nov 01, 2020   0004 XR CHEST PORT [MB]      ED Course User Index  [MB] Usha Kennedy MD         PROCEDURES    Procedure Note- Peripheral IV Access  9:31 PM  Performed by: Me  I gained IV access using  20 gauge needle because the patient had no vascular access. After cleaning the site with alcohol prep, the Right AC vein was localized with ultrasound guidance in an anterior approach. Line confirmation was obtained by direct visualization and good blood return. No anaesthetic was used. The line was successfully flushed with normal saline and was secured with transparent tape. Estimated blood loss: minimal  The procedure took 1-15 minutes, and pt tolerated well. Procedure Note - Central Venous Access:   Performed by Jono Caruso MD    Obtained emergent Consent. Immediately prior to the procedure, the patient was reevaluated and found suitable for the planned procedure and any planned medications. Immediately prior to the procedure a time out was called to verify the correct patient, procedure, equipment, staff, and marking as appropriate. The site was prepped with ChloraPrep. Using Seldinger technique a Triple Lumen CVC was placed in the Right, Internal Jugular Vein via direct cannulation with 1 number of attempts for Blood Drawing and IV Access. Ultrasound Guidance was utilized. There was good blood return.   The following complications were encountered: None. A follow-up chest x-ray was ordered post procedure. The procedure was tolerated moderately. Critical Care Time:   CRITICAL CARE NOTE :    9:28 PM    IMPENDING DETERIORATION -Cardiovascular, Metabolic and Renal  ASSOCIATED RISK FACTORS - Hypotension, Hypoxia, Metabolic changes and Dehydration  MANAGEMENT- Bedside Assessment  INTERPRETATION -  Xrays, Blood Gases, ECG and Screening Ultrasound  INTERVENTIONS - hemodynamic mngmt and Metobolic interventions  CASE REVIEW - Hospitalist  TREATMENT RESPONSE -Improved  PERFORMED BY - Self    NOTES   :  I have spent 45 minutes of critical care time involved in lab review, consultations with specialist, family decision- making, bedside attention and documentation. This time excludes time spent in any separate billed procedures. During this entire length of time I was immediately available to the patient . Alice Maciel MD      Disposition:    Admitted      Diagnosis     Clinical Impression:   1. Diabetic ketoacidosis with coma associated with type 1 diabetes mellitus ([facility]    2. Acute kidney injury ([facility]    3. Hypotension, unspecified hypotension type    4. Metabolic acidosis        Attestations:    Alice Maciel MD    Please note that this dictation was completed with Dogi, the computer voice recognition software. Quite often unanticipated grammatical, syntax, homophones, and other interpretive errors are inadvertently transcribed by the computer software. Please disregard these errors. Please excuse any errors that have escaped final proofreading. Thank you.

## 2020-11-01 NOTE — PROGRESS NOTES
0725:  SBAR from [facility] 2.  7450:  Left a message through [employer] for Dr. Lex Vivar concerning the DKA protocol. The anion gap is 12, BS is 94 and the glucose stabilizer has Insulin drip running at 1.9/hr at this time. 1155:  Dr. Lex Vivar updated on recent labs and lactic acid. Verbal orders to stop bicarb drip. Start D5 1/2 NS @ 100/hr, and continue labs: Chem and Lac acid Q4.  1818: The patient will not eat anything just yet. Leaving the food bedside in case she wants it in during the night. 1930:  SBAR to [email or web address]. Patient sleeping comfortably.

## 2020-11-02 LAB
ANION GAP SERPL CALC-SCNC: 9 MMOL/L (ref 5–15)
BACTERIA SPEC CULT: NORMAL
BASOPHILS # BLD: 0 K/UL (ref 0–0.1)
BASOPHILS NFR BLD: 0 % (ref 0–1)
BUN SERPL-MCNC: 19 MG/DL (ref 6–20)
BUN/CREAT SERPL: 20 (ref 12–20)
CALCIUM SERPL-MCNC: 7.8 MG/DL (ref 8.5–10.1)
CHLORIDE SERPL-SCNC: 108 MMOL/L (ref 97–108)
CO2 SERPL-SCNC: 26 MMOL/L (ref 21–32)
CREAT SERPL-MCNC: 0.93 MG/DL (ref 0.55–1.02)
DIFFERENTIAL METHOD BLD: ABNORMAL
EOSINOPHIL # BLD: 0 K/UL (ref 0–0.4)
EOSINOPHIL NFR BLD: 0 % (ref 0–7)
ERYTHROCYTE [DISTWIDTH] IN BLOOD BY AUTOMATED COUNT: 16 % (ref 11.5–14.5)
GLUCOSE BLD STRIP.AUTO-MCNC: 128 MG/DL (ref 65–100)
GLUCOSE BLD STRIP.AUTO-MCNC: 147 MG/DL (ref 65–100)
GLUCOSE BLD STRIP.AUTO-MCNC: 183 MG/DL (ref 65–100)
GLUCOSE BLD STRIP.AUTO-MCNC: 30 MG/DL (ref 65–100)
GLUCOSE BLD STRIP.AUTO-MCNC: 80 MG/DL (ref 65–100)
GLUCOSE SERPL-MCNC: 92 MG/DL (ref 65–100)
HCT VFR BLD AUTO: 29.5 % (ref 35–47)
HGB BLD-MCNC: 10.2 G/DL (ref 11.5–16)
IMM GRANULOCYTES # BLD AUTO: 0 K/UL (ref 0–0.04)
IMM GRANULOCYTES NFR BLD AUTO: 0 % (ref 0–0.5)
LYMPHOCYTES # BLD: 0.7 K/UL (ref 0.8–3.5)
LYMPHOCYTES NFR BLD: 5 % (ref 12–49)
MCH RBC QN AUTO: 31 PG (ref 26–34)
MCHC RBC AUTO-ENTMCNC: 34.6 G/DL (ref 30–36.5)
MCV RBC AUTO: 89.7 FL (ref 80–99)
MONOCYTES # BLD: 0.4 K/UL (ref 0–1)
MONOCYTES NFR BLD: 3 % (ref 5–13)
NEUTS SEG # BLD: 13.2 K/UL (ref 1.8–8)
NEUTS SEG NFR BLD: 92 % (ref 32–75)
NRBC # BLD: 0.03 K/UL (ref 0–0.01)
NRBC BLD-RTO: 0.2 PER 100 WBC
PLATELET # BLD AUTO: 278 K/UL (ref 150–400)
PMV BLD AUTO: 9.5 FL (ref 8.9–12.9)
POTASSIUM SERPL-SCNC: 3.3 MMOL/L (ref 3.5–5.1)
RBC # BLD AUTO: 3.29 M/UL (ref 3.8–5.2)
RBC MORPH BLD: ABNORMAL
RBC MORPH BLD: ABNORMAL
SERVICE CMNT-IMP: ABNORMAL
SERVICE CMNT-IMP: NORMAL
SERVICE CMNT-IMP: NORMAL
SODIUM SERPL-SCNC: 143 MMOL/L (ref 136–145)
WBC # BLD AUTO: 14.3 K/UL (ref 3.6–11)

## 2020-11-02 PROCEDURE — 80048 BASIC METABOLIC PNL TOTAL CA: CPT

## 2020-11-02 PROCEDURE — 65610000006 HC RM INTENSIVE CARE

## 2020-11-02 PROCEDURE — 74011250636 HC RX REV CODE- 250/636: Performed by: INTERNAL MEDICINE

## 2020-11-02 PROCEDURE — 74011250637 HC RX REV CODE- 250/637: Performed by: INTERNAL MEDICINE

## 2020-11-02 PROCEDURE — 82962 GLUCOSE BLOOD TEST: CPT

## 2020-11-02 PROCEDURE — 3331090001 HH PPS REVENUE CREDIT

## 2020-11-02 PROCEDURE — 3331090002 HH PPS REVENUE DEBIT

## 2020-11-02 PROCEDURE — 74011636637 HC RX REV CODE- 636/637: Performed by: INTERNAL MEDICINE

## 2020-11-02 PROCEDURE — 36415 COLL VENOUS BLD VENIPUNCTURE: CPT

## 2020-11-02 PROCEDURE — 85025 COMPLETE CBC W/AUTO DIFF WBC: CPT

## 2020-11-02 PROCEDURE — 87040 BLOOD CULTURE FOR BACTERIA: CPT

## 2020-11-02 PROCEDURE — 74011000250 HC RX REV CODE- 250: Performed by: NURSE PRACTITIONER

## 2020-11-02 PROCEDURE — 99223 1ST HOSP IP/OBS HIGH 75: CPT | Performed by: INTERNAL MEDICINE

## 2020-11-02 PROCEDURE — 74011000258 HC RX REV CODE- 258: Performed by: INTERNAL MEDICINE

## 2020-11-02 RX ORDER — INSULIN GLARGINE 100 [IU]/ML
16 INJECTION, SOLUTION SUBCUTANEOUS DAILY
Status: DISCONTINUED | OUTPATIENT
Start: 2020-11-02 | End: 2020-11-05

## 2020-11-02 RX ORDER — INSULIN LISPRO 100 [IU]/ML
3 INJECTION, SOLUTION INTRAVENOUS; SUBCUTANEOUS
Status: DISCONTINUED | OUTPATIENT
Start: 2020-11-02 | End: 2020-11-05

## 2020-11-02 RX ORDER — PHENYLEPHRINE HCL IN 0.9% NACL 0.4MG/10ML
SYRINGE (ML) INTRAVENOUS
Status: DISPENSED
Start: 2020-11-02 | End: 2020-11-03

## 2020-11-02 RX ORDER — DEXTROSE, SODIUM CHLORIDE, AND POTASSIUM CHLORIDE 5; .45; .3 G/100ML; G/100ML; G/100ML
INJECTION INTRAVENOUS CONTINUOUS
Status: DISCONTINUED | OUTPATIENT
Start: 2020-11-02 | End: 2020-11-02 | Stop reason: CLARIF

## 2020-11-02 RX ORDER — INSULIN LISPRO 100 [IU]/ML
INJECTION, SOLUTION INTRAVENOUS; SUBCUTANEOUS
Status: DISCONTINUED | OUTPATIENT
Start: 2020-11-02 | End: 2020-11-02

## 2020-11-02 RX ORDER — POTASSIUM CHLORIDE, DEXTROSE MONOHYDRATE AND SODIUM CHLORIDE 300; 5; 900 MG/100ML; G/100ML; MG/100ML
INJECTION, SOLUTION INTRAVENOUS CONTINUOUS
Status: DISCONTINUED | OUTPATIENT
Start: 2020-11-02 | End: 2020-11-03

## 2020-11-02 RX ORDER — INSULIN LISPRO 100 [IU]/ML
2 INJECTION, SOLUTION INTRAVENOUS; SUBCUTANEOUS
Status: DISCONTINUED | OUTPATIENT
Start: 2020-11-02 | End: 2020-11-07

## 2020-11-02 RX ADMIN — CLOPIDOGREL BISULFATE 75 MG: 75 TABLET ORAL at 08:03

## 2020-11-02 RX ADMIN — Medication 30 ML: at 14:48

## 2020-11-02 RX ADMIN — DEXTROSE MONOHYDRATE 25 G: 25 INJECTION, SOLUTION INTRAVENOUS at 03:52

## 2020-11-02 RX ADMIN — INSULIN LISPRO 2 UNITS: 100 INJECTION, SOLUTION INTRAVENOUS; SUBCUTANEOUS at 10:22

## 2020-11-02 RX ADMIN — DEXTROSE MONOHYDRATE, SODIUM CHLORIDE, AND POTASSIUM CHLORIDE: 50; 9; 2.98 INJECTION, SOLUTION INTRAVENOUS at 10:22

## 2020-11-02 RX ADMIN — Medication 20 ML: at 21:01

## 2020-11-02 RX ADMIN — PIPERACILLIN AND TAZOBACTAM 3.38 G: 3; .375 INJECTION, POWDER, LYOPHILIZED, FOR SOLUTION INTRAVENOUS at 21:00

## 2020-11-02 RX ADMIN — LEVOTHYROXINE SODIUM 175 MCG: 0.03 TABLET ORAL at 06:53

## 2020-11-02 RX ADMIN — INSULIN LISPRO 3 UNITS: 100 INJECTION, SOLUTION INTRAVENOUS; SUBCUTANEOUS at 13:11

## 2020-11-02 RX ADMIN — PIPERACILLIN AND TAZOBACTAM 3.38 G: 3; .375 INJECTION, POWDER, LYOPHILIZED, FOR SOLUTION INTRAVENOUS at 14:47

## 2020-11-02 RX ADMIN — ENOXAPARIN SODIUM 40 MG: 40 INJECTION SUBCUTANEOUS at 08:03

## 2020-11-02 RX ADMIN — Medication 1 AMPULE: at 08:03

## 2020-11-02 RX ADMIN — Medication 1 AMPULE: at 20:50

## 2020-11-02 RX ADMIN — SODIUM CHLORIDE 650 MG: 9 INJECTION, SOLUTION INTRAVENOUS at 01:56

## 2020-11-02 RX ADMIN — INSULIN GLARGINE 16 UNITS: 100 INJECTION, SOLUTION SUBCUTANEOUS at 10:23

## 2020-11-02 RX ADMIN — Medication 1 CAPSULE: at 10:25

## 2020-11-02 RX ADMIN — PIPERACILLIN AND TAZOBACTAM 3.38 G: 3; .375 INJECTION, POWDER, LYOPHILIZED, FOR SOLUTION INTRAVENOUS at 06:53

## 2020-11-02 RX ADMIN — Medication 10 ML: at 06:53

## 2020-11-02 RX ADMIN — DEXTROSE MONOHYDRATE, SODIUM CHLORIDE, AND POTASSIUM CHLORIDE: 50; 9; 2.98 INJECTION, SOLUTION INTRAVENOUS at 18:50

## 2020-11-02 RX ADMIN — PRAVASTATIN SODIUM 80 MG: 40 TABLET ORAL at 08:03

## 2020-11-02 NOTE — PROGRESS NOTES
Received notification from bedside RN about patient with regards to:insulindrip discontinued at 1400, need order for blood sugar check and sliding scale coverage    Intervention given: BG check q 6 with Lispro SSI

## 2020-11-02 NOTE — PROGRESS NOTES
General Daily Progress Note    Admit Date: 10/31/2020    Subjective:     Patient has no complaint. .     Current Facility-Administered Medications   Medication Dose Route Frequency    dextrose 5% and 0.9% NaCl 1,000 mL with potassium chloride 40 mEq infusion   IntraVENous CONTINUOUS    insulin glargine (LANTUS) injection 16 Units  16 Units SubCUTAneous DAILY    insulin lispro (HUMALOG) injection 2 Units  2 Units SubCUTAneous ACB    insulin lispro (HUMALOG) injection 3 Units  3 Units SubCUTAneous ACL    lactobac ac& pc-s.therm-b.anim (GALILEA Q/RISAQUAD)  1 Cap Oral DAILY    vancomycin (VANCOCIN) 650 mg in 0.9% sodium chloride 250 mL IVPB  650 mg IntraVENous Q24H    alcohol 62% (NOZIN) nasal  1 Ampule  1 Ampule Topical Q12H    NOREPINephrine (LEVOPHED) 8 mg in 5% dextrose 250mL (32 mcg/mL) infusion  0.5-16 mcg/min IntraVENous TITRATE    glucose chewable tablet 16 g  4 Tab Oral PRN    dextrose (D50W) injection syrg 12.5-25 g  12.5-25 g IntraVENous PRN    glucagon (GLUCAGEN) injection 1 mg  1 mg IntraMUSCular PRN    clopidogreL (PLAVIX) tablet 75 mg  75 mg Oral DAILY    levothyroxine (SYNTHROID) tablet 175 mcg  175 mcg Oral 6am    pravastatin (PRAVACHOL) tablet 80 mg  80 mg Oral DAILY    sodium chloride (NS) flush 5-40 mL  5-40 mL IntraVENous Q8H    sodium chloride (NS) flush 5-40 mL  5-40 mL IntraVENous PRN    acetaminophen (TYLENOL) tablet 650 mg  650 mg Oral Q6H PRN    Or    acetaminophen (TYLENOL) suppository 650 mg  650 mg Rectal Q6H PRN    polyethylene glycol (MIRALAX) packet 17 g  17 g Oral DAILY PRN    promethazine (PHENERGAN) tablet 12.5 mg  12.5 mg Oral Q6H PRN    Or    ondansetron (ZOFRAN) injection 4 mg  4 mg IntraVENous Q6H PRN    enoxaparin (LOVENOX) injection 40 mg  40 mg SubCUTAneous DAILY    piperacillin-tazobactam (ZOSYN) 3.375 g in 0.9% sodium chloride (MBP/ADV) 100 mL  3.375 g IntraVENous Q8H        Review of Systems  Review of systems not obtained due to patient factors.     Objective:     Patient Vitals for the past 24 hrs:   BP Temp Pulse Resp SpO2   11/02/20 0800 (!) 137/55 98.3 °F (36.8 °C) 77 18 99 %   11/02/20 0745 (!) 126/51 98.2 °F (36.8 °C) 73 15 99 %   11/02/20 0730 (!) 132/53 98.2 °F (36.8 °C) 74 16 99 %   11/02/20 0715 (!) 129/55 98.1 °F (36.7 °C) 72 14 99 %   11/02/20 0700 (!) 145/59 98.2 °F (36.8 °C) 81 21 100 %   11/02/20 0600 (!) 99/44 98.4 °F (36.9 °C) 74 13 98 %   11/02/20 0500 (!) 120/48 98.4 °F (36.9 °C) 78 14 99 %   11/02/20 0400 (!) 123/44 99.2 °F (37.3 °C) 85 16 99 %   11/02/20 0300 (!) 98/46 98.3 °F (36.8 °C) 89 15 98 %   11/02/20 0200 (!) 110/42 99.6 °F (37.6 °C) 85 14 98 %   11/02/20 0100 (!) 101/45 99.4 °F (37.4 °C) 82 14 97 %   11/02/20 0000 (!) 108/44 99.3 °F (37.4 °C) 84 13 98 %   11/01/20 2300 (!) 111/42 99.3 °F (37.4 °C) 83 15 99 %   11/01/20 2230 (!) 93/40 99.4 °F (37.4 °C) 84 14 97 %   11/01/20 2200 (!) 91/39 99.4 °F (37.4 °C) 84 15 98 %   11/01/20 2045 (!) 119/47 99.3 °F (37.4 °C) 86 15 100 %   11/01/20 2000 (!) 97/40 99.2 °F (37.3 °C) 85 13 98 %   11/01/20 1930 (!) 94/39 99.2 °F (37.3 °C) 84 13 98 %   11/01/20 1900 (!) 113/42 99.1 °F (37.3 °C) 80 13 100 %   11/01/20 1830 (!) 120/42 99 °F (37.2 °C) 79 13 99 %   11/01/20 1800 (!) 111/41 98.9 °F (37.2 °C) 75 13 99 %   11/01/20 1730 (!) 105/39 98.8 °F (37.1 °C) 83 14 98 %   11/01/20 1700 (!) 117/43 98.7 °F (37.1 °C) 77 14 98 %   11/01/20 1630 (!) 108/55 98.6 °F (37 °C) 75 14 98 %   11/01/20 1600 (!) 111/42 98.4 °F (36.9 °C) 73 15 98 %   11/01/20 1530 (!) 101/41 98.4 °F (36.9 °C) 76 14 100 %   11/01/20 1500 (!) 99/39 98.4 °F (36.9 °C) 76 14 99 %   11/01/20 1430 (!) 121/46 98.3 °F (36.8 °C) 79 15 99 %   11/01/20 1400 (!) 95/43 98.4 °F (36.9 °C) 78 14 98 %   11/01/20 1330 (!) 88/38 98.3 °F (36.8 °C) 75 11 98 %   11/01/20 1300 (!) 102/49 98.2 °F (36.8 °C) 74 13 99 %   11/01/20 1230 (!) 98/47 98.2 °F (36.8 °C) 78 16 99 %   11/01/20 1200 (!) 92/39 98.3 °F (36.8 °C) 76 14 98 %   11/01/20 1130 (!) 109/45 98.3 °F (36.8 °C) 78 16 98 %   11/01/20 1100 (!) 111/44 98.3 °F (36.8 °C) 84 14 98 %   11/01/20 1030 (!) 111/45 98.3 °F (36.8 °C) 78 15 99 %   11/01/20 1000 (!) 111/43 98.2 °F (36.8 °C) 79 14 98 %   11/01/20 0930 (!) 108/46 98.1 °F (36.7 °C) 81 25 99 %   11/01/20 0900 (!) 114/40 98.1 °F (36.7 °C) 77 14 99 %     11/02 0701 - 11/02 1900  In: 108.3 [I.V.:108.3]  Out: 33 [Urine:33]  10/31 1901 - 11/02 0700  In: 5566.3 [I.V.:5566.3]  Out: 2065 [Urine:2065]    Physical Exam:   Visit Vitals  BP (!) 137/55 (BP 1 Location: Left arm, BP Patient Position: At rest)   Pulse 77   Temp 98.3 °F (36.8 °C)   Resp 18   Ht 5' 3\" (1.6 m)   Wt 133 lb 9.6 oz (60.6 kg)   SpO2 99%   BMI 23.67 kg/m²     General appearance: alert, cooperative, no distress, appears stated age  Neck: supple, symmetrical, trachea midline, no adenopathy, thyroid: not enlarged, symmetric, no tenderness/mass/nodules, no carotid bruit and no JVD  Lungs: clear to auscultation bilaterally  Heart: regular rate and rhythm, S1, S2 normal, no murmur, click, rub or gallop  Abdomen: soft, non-tender. Bowel sounds normal. No masses,  no organomegaly  Extremities: extremities normal, atraumatic, no cyanosis or edema    Assessment:     Active Problems:    [facility]acidoses[facility] (6/2/2017)      Severe dehydration (10/31/2020)      Septic shock ([facility] (10/31/2020)        Plan:     1. Patient admitted for DKA which has resolved. 2.  This was complicated by probable sepsis given positive blood cultures for gram-positive cocci. Broad-spectrum antibiotic started appropriately. There was an element of septic shock responsive to Levophed which is now off. We will have ID evaluate patient. 3.. Continue hydration. 4.  No evidence of volume overload.

## 2020-11-02 NOTE — PROGRESS NOTES
11/2/2020    INTENSIVIST PROGRESS NOTE:     Patient seen and evaluated, chart reviewed   68 yo female admitted to ccu with metabolic acidosis, lactic acidosis, DKA  Started on bicarb drip, insulin drip, abx  Now pt in CCU critically ill on pressors    ROS: unable to obtain    Visit Vitals  BP (!) 137/55 (BP 1 Location: Left arm, BP Patient Position: At rest)   Pulse 77   Temp 98.3 °F (36.8 °C)   Resp 18   Ht 5' 3\" (1.6 m)   Wt 60.6 kg (133 lb 9.6 oz)   SpO2 99%   BMI 23.67 kg/m²       General: critically ill, on levophed. Eyes: anicteric  HEENT: dry oral mucosa  Neck: FROM  CV: RRR  Lungs: clear, comfortable. Abd: soft, NT, ND.   : no flank pain  Ext: no edema  Skin: no rash  Musculoskeletal: normal inspection  Neuro: Alert, No distress. Follows simple commands. CXR: clear    Labs reviewed    A/P:  - DKA: IVF, insulin drip, poor compliance. Frequent admissions for DKA. - lactic acidosis: IVF, bicarb drip, CT ordered by hospitalist  -Bacteremia noted. Dr. Lex Castaneda consulted ID. ON Zosyn and Vanc. Will repeat Paired blood Cx.   - shock: IVF, pressors, Still on low dose levophed  - ONEYDA: IVF  - Can start taking some po intake.    - PUD prophylaxis  - Will assist on disposition planning when stable for transfer  Heike Alaniz MD   CC TIME 35 MINUTES

## 2020-11-02 NOTE — INTERDISCIPLINARY ROUNDS
Critical care interdisciplinary rounds held on 11/2/2020. Following members present, Pharmacy, Diabetes Treatment, Case Management, Respiratory Therapy, Physical Therapy and Nutrition. Led by ASHTYN Hamm RN and Dr. Sarahy Frye. Plan of care discussed. See clinical pathway for plan of care and interventions and desired outcomes.

## 2020-11-02 NOTE — DIABETES MGMT
.  Donya Canchola SPECIALIST CONSULT  PROGRAM FOR DIABETES HEALTH    INITIAL NOTE    Presentation   Becky Whitney is a 67 y.o. female admitted from the ER 10/31/20 with high blood sugar and confusion. According to EMS, patient found her in the home in disarray. Given 20 units of insulin before being brought to the hospital.   Lab: BG 1214. AG Could not be calculated. Lactic acid 6.7. Procalcitonin 1.43. Troponin Negative. Serum creatinine 1.8/GFR 33. Blood cultures Gram positive cocci. Urine culture: Negative. HX:   Past Medical History:   Diagnosis Date    [facility]     Heart failure ([facility]     unknown to family    Hypertension     Stroke ([facility]      DX: DKA. Sepsis. Acute on chronic encephalopathy. ONEYDA (resolved). TX: Insulin. ABx. Current clinical course has been uncomplicated. 10/29/20 Liver enzymes elevated. 11/1/20  Transitioned off Glucostabilizer => Lantus 15 units D; now on Lantus insulin 16 units D along with mealtime insulin. CT ABd:   1. No acute intra-abdominal process. 2. Stable small left pleural effusion    Diabetes: Patient has known Type 2 diabetes, treated with insulin PTA. Admission BG 1214. A1c 13.2%. Family history positive for diabetes in father. Consulted by Provider for advanced diabetes nursing assessment and care, specifically related to   [x] Inpatient management strategy    Diabetes-related medical history  Acute complications  DKA  Macrovascular disease  Cerebral vascular accident    Diabetes medication history  Drug class Currently in use Discontinued Never used   Biguanide      DDP-4 inhibitor       Sulfonylurea      Thiazolidinedione      GLP-1 RA      SGLT-2 inhibitors      Basal insulin Tresiba 12 units D     Fixed Dose  Combinations      Bolus insulin Humalog 2 units                    with breakfast & lunch       Subjective   I give my insulin.     Patient reports the following home diabetes self-care practices:  Eating pattern-\"I like to cook.\"  [x] Breakfast Oatmeal with 12oz OJ  [] Lunch  Skips. Drinks 12 oz OJ  [x] Dinner  [place] with chips and 12 oz OJ  Physical activity pattern  [x] Non-aerobic activity  Monitoring pattern-Not testing. Taking medications pattern-States she gives her own insulin. [x] Consistent administration    States that she lives with her two sons and they take \"good care of her. \"    Objective   Physical exam  General Alert, oriented to person. In no acute distress. Conversant and cooperative. Vital Signs Low-grade fever. NSR. Normotensive. Visit Vitals  BP (!) 109/51 (BP 1 Location: Left arm, BP Patient Position: At rest)   Pulse 76   Temp 99 °F (37.2 °C)   Resp 17   Ht 5' 3\" (1.6 m)   Wt 60.6 kg (133 lb 9.6 oz)   SpO2 100%   BMI 23.67 kg/m²     Laboratory  Lab Results   Component Value Date/Time    Hemoglobin A1c 13.2 (H) 10/21/2020 05:41 AM     Lab Results   Component Value Date/Time    LDL, calculated 55.6 06/21/2020 05:05 PM     Lab Results   Component Value Date/Time    Creatinine (POC) 1.2 10/20/2020 06:00 PM    Creatinine 0.93 11/02/2020 04:23 AM     Lab Results   Component Value Date/Time    Sodium 143 11/02/2020 04:23 AM    Potassium 3.3 (L) 11/02/2020 04:23 AM    Chloride 108 11/02/2020 04:23 AM    CO2 26 11/02/2020 04:23 AM    Anion gap 9 11/02/2020 04:23 AM    Glucose 92 11/02/2020 04:23 AM    BUN 19 11/02/2020 04:23 AM    Creatinine 0.93 11/02/2020 04:23 AM    BUN/Creatinine ratio 20 11/02/2020 04:23 AM    GFR est AA >60 11/02/2020 04:23 AM    GFR est non-AA 58 (L) 11/02/2020 04:23 AM    Calcium 7.8 (L) 11/02/2020 04:23 AM    Bilirubin, total 0.3 11/01/2020 03:55 PM    Alk.  phosphatase 195 (H) 11/01/2020 03:55 PM    Protein, total 5.2 (L) 11/01/2020 03:55 PM    Albumin 2.3 (L) 11/01/2020 03:55 PM    Globulin 2.9 11/01/2020 03:55 PM    A-G Ratio 0.8 (L) 11/01/2020 03:55 PM    ALT (SGPT) 121 (H) 11/01/2020 03:55 PM     Lab Results   Component Value Date/Time    ALT (SGPT) 121 (H) 11/01/2020 03:55 PM Factors affecting BG pattern  Factor Dose Comments   Nutrition:  Carb-controlled meals   60 gram meals   Began today; eating 100%. Infection Zoysn & vancomycin Low grade fever. WBC 14.3. Other: Kidney function  ONEYDA resolved. GFR >60      Blood glucose pattern        Assessment and Plan   Nursing Diagnosis Risk for unstable blood glucose pattern   Nursing Intervention Domain 2883 Decision-making Support   Nursing Interventions Examined current inpatient diabetes control   Explored factors facilitating and impeding inpatient management  Identified self-management practices impeding diabetes control  Informed patient that she would benefit from in home support     Evaluation   This  female, with Type 2 diabetes, has come to the hospital in DKA multiple times. Although this woman states that her sons are great supports to her at home, she needs help with structured assistance in medication administration. I would strongly recommend placement in a facility that will provide care inclusive of medication administration. Recommendations   Recommend:    Insulin dosing already prescribed by Dr. Dash Hernandez insulin  [x] HIGH sensitivity    Referral   [x] SNF placement    Billing Code(s)   I personally reviewed chart, notes, data and current medications in the medical record, and examined the patient at bedside before making care recommendations.      [x] [id number] IP subsequent hospital care - 45 minutes      SOLOMON Us  Program for Diabetes Health  Access via Elite Form

## 2020-11-02 NOTE — PROGRESS NOTES
19: 00-Received report from [place], [place]. Questioned why there were no blood sugar checks after insulin gtt stopped without further checks put in.  20:00-Complex assessment completed, mouth swabbed, and pt resting comfortably in bed. 00:00-Reassessed pt, no changes. Repositioned and mouth care completed. 02:00-Vs stable, pt resting comfortably in bed. 03:40-Pt exhibiting erratic behavior, and not oriented. Checked blood sugar, it was critical. Treated pt per [facility] protocol. 04:00-Vs Stable, pt resting comfortably in bed now. Reassessed, pt calm and able to answer all questioned appropriately. No other assessment changes. 04:15-Rechecked sugar, 183, will recheck at 0600 check. 06:00-Pt did not require, coverage, but did blood sugar did drop to 80, will inform day shift RN to monitor sugar closely. 07:00-Report given to VIN Starks. Nights events and POC updates.

## 2020-11-02 NOTE — PROGRESS NOTES
0720-Bedside and Verbal shift change report received from Lesly connelly, [facility] (offgoing nurse) to Ty Sherman RN (oncoming nurse). Report included the following information SBAR, Kardex, MAR and Recent Results. 0800-Shift assessment completed-see flowsheet. Pt. is A&O x3, unsure of the year (states it's 2012), but is able to state the current President's name. Able to make needs known-no c/o voiced;denies pain or discomfort. Respirations even and unlabored on RA. Skin is warm and dry. Peripheral IVs to L and R AC D/C'd at this time as they were unable to be flushed. Berry catheter patent. 0950-IDT rounds held at this time. 1115-Paired blood cultures obtained and walked down to the lab at this time. 1200-Reassessment completed-no changes noted. 1600-Reassessment completed-no changes noted. 1800-Dr. Lewis in to see pt. for ID consult as pt. had 2 out of 4 blood cx bottles result as positive for gram + cocci in clusters. Dr. Galloway Him inquired about pt's urine output and urine resulted with + yeast-made aware of poor urine output and stated to keep berry catheter in to monitor output. 1920-Bedside and Verbal shift change report given to [place], RN (oncoming nurse) by Ty Sherman RN (offgoing nurse). Report included the following information SBAR, Kardex, MAR and Recent Results.

## 2020-11-03 ENCOUNTER — PATIENT OUTREACH (OUTPATIENT)
Dept: CASE MANAGEMENT | Age: 80
End: 2020-11-03

## 2020-11-03 ENCOUNTER — APPOINTMENT (OUTPATIENT)
Dept: NON INVASIVE DIAGNOSTICS | Age: 80
DRG: 871 | End: 2020-11-03
Attending: INTERNAL MEDICINE
Payer: MEDICARE

## 2020-11-03 LAB
ANION GAP SERPL CALC-SCNC: 6 MMOL/L (ref 5–15)
BASOPHILS # BLD: 0 K/UL (ref 0–0.1)
BASOPHILS NFR BLD: 0 % (ref 0–1)
BLASTS NFR BLD MANUAL: 0 %
BUN SERPL-MCNC: 14 MG/DL (ref 6–20)
BUN/CREAT SERPL: 14 (ref 12–20)
CALCIUM SERPL-MCNC: 7.2 MG/DL (ref 8.5–10.1)
CHLORIDE SERPL-SCNC: 112 MMOL/L (ref 97–108)
CO2 SERPL-SCNC: 23 MMOL/L (ref 21–32)
CREAT SERPL-MCNC: 0.97 MG/DL (ref 0.55–1.02)
DATE LAST DOSE: NORMAL
DIFFERENTIAL METHOD BLD: ABNORMAL
EOSINOPHIL # BLD: 0.1 K/UL (ref 0–0.4)
EOSINOPHIL NFR BLD: 1 % (ref 0–7)
ERYTHROCYTE [DISTWIDTH] IN BLOOD BY AUTOMATED COUNT: 17.4 % (ref 11.5–14.5)
GLUCOSE BLD STRIP.AUTO-MCNC: 155 MG/DL (ref 65–100)
GLUCOSE BLD STRIP.AUTO-MCNC: 160 MG/DL (ref 65–100)
GLUCOSE BLD STRIP.AUTO-MCNC: 189 MG/DL (ref 65–100)
GLUCOSE BLD STRIP.AUTO-MCNC: 298 MG/DL (ref 65–100)
GLUCOSE BLD STRIP.AUTO-MCNC: 55 MG/DL (ref 65–100)
GLUCOSE BLD STRIP.AUTO-MCNC: 89 MG/DL (ref 65–100)
GLUCOSE SERPL-MCNC: 185 MG/DL (ref 65–100)
HCT VFR BLD AUTO: 28.3 % (ref 35–47)
HGB BLD-MCNC: 9.7 G/DL (ref 11.5–16)
IMM GRANULOCYTES # BLD AUTO: 0 K/UL
IMM GRANULOCYTES NFR BLD AUTO: 0 %
LYMPHOCYTES # BLD: 0.8 K/UL (ref 0.8–3.5)
LYMPHOCYTES NFR BLD: 11 % (ref 12–49)
MCH RBC QN AUTO: 31.9 PG (ref 26–34)
MCHC RBC AUTO-ENTMCNC: 34.3 G/DL (ref 30–36.5)
MCV RBC AUTO: 93.1 FL (ref 80–99)
METAMYELOCYTES NFR BLD MANUAL: 0 %
MONOCYTES # BLD: 0.6 K/UL (ref 0–1)
MONOCYTES NFR BLD: 8 % (ref 5–13)
MYELOCYTES NFR BLD MANUAL: 0 %
NEUTS BAND NFR BLD MANUAL: 0 % (ref 0–6)
NEUTS SEG # BLD: 6.1 K/UL (ref 1.8–8)
NEUTS SEG NFR BLD: 80 % (ref 32–75)
NRBC # BLD: 0.03 K/UL (ref 0–0.01)
NRBC BLD-RTO: 0.4 PER 100 WBC
OTHER CELLS NFR BLD MANUAL: 0 %
PLATELET # BLD AUTO: 227 K/UL (ref 150–400)
PMV BLD AUTO: 9.6 FL (ref 8.9–12.9)
POTASSIUM SERPL-SCNC: 4.2 MMOL/L (ref 3.5–5.1)
PROMYELOCYTES NFR BLD MANUAL: 0 %
RBC # BLD AUTO: 3.04 M/UL (ref 3.8–5.2)
RBC MORPH BLD: ABNORMAL
REPORTED DOSE,DOSE: NORMAL UNITS
REPORTED DOSE/TIME,TMG: NORMAL
SERVICE CMNT-IMP: ABNORMAL
SERVICE CMNT-IMP: NORMAL
SODIUM SERPL-SCNC: 141 MMOL/L (ref 136–145)
VANCOMYCIN TROUGH SERPL-MCNC: 7.9 UG/ML (ref 5–10)
WBC # BLD AUTO: 7.6 K/UL (ref 3.6–11)

## 2020-11-03 PROCEDURE — 36415 COLL VENOUS BLD VENIPUNCTURE: CPT

## 2020-11-03 PROCEDURE — 80202 ASSAY OF VANCOMYCIN: CPT

## 2020-11-03 PROCEDURE — 74011250637 HC RX REV CODE- 250/637: Performed by: INTERNAL MEDICINE

## 2020-11-03 PROCEDURE — 80048 BASIC METABOLIC PNL TOTAL CA: CPT

## 2020-11-03 PROCEDURE — 74011000258 HC RX REV CODE- 258: Performed by: INTERNAL MEDICINE

## 2020-11-03 PROCEDURE — 74011636637 HC RX REV CODE- 636/637: Performed by: INTERNAL MEDICINE

## 2020-11-03 PROCEDURE — 82962 GLUCOSE BLOOD TEST: CPT

## 2020-11-03 PROCEDURE — 74011250636 HC RX REV CODE- 250/636: Performed by: INTERNAL MEDICINE

## 2020-11-03 PROCEDURE — 3331090001 HH PPS REVENUE CREDIT

## 2020-11-03 PROCEDURE — 3331090002 HH PPS REVENUE DEBIT

## 2020-11-03 PROCEDURE — 85027 COMPLETE CBC AUTOMATED: CPT

## 2020-11-03 PROCEDURE — 99232 SBSQ HOSP IP/OBS MODERATE 35: CPT | Performed by: INTERNAL MEDICINE

## 2020-11-03 PROCEDURE — 65660000000 HC RM CCU STEPDOWN

## 2020-11-03 RX ORDER — DEXTROSE, SODIUM CHLORIDE, AND POTASSIUM CHLORIDE 5; .45; .22 G/100ML; G/100ML; G/100ML
INJECTION INTRAVENOUS CONTINUOUS
Status: DISCONTINUED | OUTPATIENT
Start: 2020-11-03 | End: 2020-11-04

## 2020-11-03 RX ADMIN — INSULIN GLARGINE 16 UNITS: 100 INJECTION, SOLUTION SUBCUTANEOUS at 08:58

## 2020-11-03 RX ADMIN — SODIUM CHLORIDE 650 MG: 9 INJECTION, SOLUTION INTRAVENOUS at 01:05

## 2020-11-03 RX ADMIN — POTASSIUM CHLORIDE: 2 INJECTION, SOLUTION, CONCENTRATE INTRAVENOUS at 23:01

## 2020-11-03 RX ADMIN — ENOXAPARIN SODIUM 40 MG: 40 INJECTION SUBCUTANEOUS at 08:59

## 2020-11-03 RX ADMIN — Medication 10 ML: at 05:30

## 2020-11-03 RX ADMIN — POTASSIUM CHLORIDE: 2 INJECTION, SOLUTION, CONCENTRATE INTRAVENOUS at 09:56

## 2020-11-03 RX ADMIN — VANCOMYCIN HYDROCHLORIDE 500 MG: 500 INJECTION, POWDER, LYOPHILIZED, FOR SOLUTION INTRAVENOUS at 13:04

## 2020-11-03 RX ADMIN — INSULIN LISPRO 2 UNITS: 100 INJECTION, SOLUTION INTRAVENOUS; SUBCUTANEOUS at 08:58

## 2020-11-03 RX ADMIN — LEVOTHYROXINE SODIUM 175 MCG: 0.03 TABLET ORAL at 05:30

## 2020-11-03 RX ADMIN — CLOPIDOGREL BISULFATE 75 MG: 75 TABLET ORAL at 08:58

## 2020-11-03 RX ADMIN — Medication 1 CAPSULE: at 08:59

## 2020-11-03 RX ADMIN — Medication 10 ML: at 13:05

## 2020-11-03 RX ADMIN — Medication 1 AMPULE: at 08:59

## 2020-11-03 RX ADMIN — Medication 10 ML: at 22:59

## 2020-11-03 RX ADMIN — DEXTROSE MONOHYDRATE, SODIUM CHLORIDE, AND POTASSIUM CHLORIDE: 50; 9; 2.98 INJECTION, SOLUTION INTRAVENOUS at 02:43

## 2020-11-03 RX ADMIN — INSULIN LISPRO 3 UNITS: 100 INJECTION, SOLUTION INTRAVENOUS; SUBCUTANEOUS at 13:12

## 2020-11-03 RX ADMIN — PRAVASTATIN SODIUM 80 MG: 40 TABLET ORAL at 08:58

## 2020-11-03 NOTE — PROGRESS NOTES
11/3/2020    INTENSIVIST PROGRESS NOTE:     Pt is without issues this am.   NO acute complaints. NO headaches. Tolerating po intake well. Patient seen and evaluated, chart reviewed   68 yo female admitted to ccu with metabolic acidosis, lactic acidosis, DKA  Started on bicarb drip, insulin drip, abx  Now pt in CCU critically ill on pressors    ROS: unable to obtain    Visit Vitals  /60   Pulse 82   Temp 99 °F (37.2 °C)   Resp 23   Ht 5' 3\" (1.6 m)   Wt 60.6 kg (133 lb 9.6 oz)   SpO2 100%   BMI 23.67 kg/m²       General: critically ill, on levophed. Eyes: anicteric  HEENT: dry oral mucosa  Neck: FROM  CV: RRR  Lungs: clear, comfortable. Abd: soft, NT, ND.   : no flank pain  Ext: no edema  Skin: no rash  Musculoskeletal: normal inspection  Neuro: Alert, No distress. Follows simple commands. CXR: clear    Labs reviewed    A/P:  - DKA: better. Off insulin drip. - lactic acidosis: better.   -Bacteremia noted. Dr. James Muniz consulted ID. ON Zosyn and Vanc. Will repeat Paired blood Cx.   - shock: better off levophed. - ONEYDA: IVF  - PUD prophylaxis  - Stable for transfer  -Will see again as needed.    Marion Krishnan MD

## 2020-11-03 NOTE — PROGRESS NOTES
General Daily Progress Note    Admit Date: 10/31/2020    Subjective:     Patient has no complaint  . Lavelle Kerr Current Facility-Administered Medications   Medication Dose Route Frequency    vancomycin (VANCOCIN) 500 mg in 0.9% sodium chloride (MBP/ADV) 100 mL  500 mg IntraVENous Q12H    dextrose 5 % - 0.45% NaCl 1,000 mL with potassium chloride 30 mEq infusion   IntraVENous CONTINUOUS    insulin glargine (LANTUS) injection 16 Units  16 Units SubCUTAneous DAILY    insulin lispro (HUMALOG) injection 2 Units  2 Units SubCUTAneous ACB    insulin lispro (HUMALOG) injection 3 Units  3 Units SubCUTAneous ACL    lactobac ac& pc-s.therm-b.anim (GALILEA Q/RISAQUAD)  1 Cap Oral DAILY    PHENYLephrine (NEOSYNEPHRINE) 0.4 mg/10 mL (40 mcg/mL) in NS syringe        alcohol 62% (NOZIN) nasal  1 Ampule  1 Ampule Topical Q12H    glucose chewable tablet 16 g  4 Tab Oral PRN    dextrose (D50W) injection syrg 12.5-25 g  12.5-25 g IntraVENous PRN    glucagon (GLUCAGEN) injection 1 mg  1 mg IntraMUSCular PRN    clopidogreL (PLAVIX) tablet 75 mg  75 mg Oral DAILY    levothyroxine (SYNTHROID) tablet 175 mcg  175 mcg Oral 6am    pravastatin (PRAVACHOL) tablet 80 mg  80 mg Oral DAILY    sodium chloride (NS) flush 5-40 mL  5-40 mL IntraVENous Q8H    sodium chloride (NS) flush 5-40 mL  5-40 mL IntraVENous PRN    acetaminophen (TYLENOL) tablet 650 mg  650 mg Oral Q6H PRN    Or    acetaminophen (TYLENOL) suppository 650 mg  650 mg Rectal Q6H PRN    polyethylene glycol (MIRALAX) packet 17 g  17 g Oral DAILY PRN    promethazine (PHENERGAN) tablet 12.5 mg  12.5 mg Oral Q6H PRN    Or    ondansetron (ZOFRAN) injection 4 mg  4 mg IntraVENous Q6H PRN    enoxaparin (LOVENOX) injection 40 mg  40 mg SubCUTAneous DAILY        Review of Systems  A comprehensive review of systems was negative.     Objective:     Patient Vitals for the past 24 hrs:   BP Temp Pulse Resp SpO2   11/03/20 0830 (!) 139/52 98.4 °F (36.9 °C) 81 19 97 % 11/03/20 0815  98.3 °F (36.8 °C) 79 17 99 %   11/03/20 0800  98.2 °F (36.8 °C) 75 15 100 %   11/03/20 0745  98.2 °F (36.8 °C) 76 23 100 %   11/03/20 0730 130/85 98.2 °F (36.8 °C) 73 15 99 %   11/03/20 0715  98.1 °F (36.7 °C) 73 14 100 %   11/03/20 0700 (!) 143/78 98.1 °F (36.7 °C) 76 16 100 %   11/03/20 0600 (!) 146/64 98.2 °F (36.8 °C) 69 15 100 %   11/03/20 0500 120/64 98.4 °F (36.9 °C) 64 14 100 %   11/03/20 0400 (!) 130/59 98.3 °F (36.8 °C) 73 16 100 %   11/03/20 0300 139/65 98.3 °F (36.8 °C) 70 15 100 %   11/03/20 0200 (!) 106/59 98.5 °F (36.9 °C) 66 13 99 %   11/03/20 0030 (!) 121/53 98.3 °F (36.8 °C) 70 17 100 %   11/03/20 0000  98.4 °F (36.9 °C) 81 19 100 %   11/02/20 2308 (!) 99/59 98.5 °F (36.9 °C) 72 14 100 %   11/02/20 2238 (!) 108/56 98.4 °F (36.9 °C) 70 17 100 %   11/02/20 2219 (!) 111/58 98.4 °F (36.9 °C) 69 16 100 %   11/02/20 2130 (!) 120/54 98.4 °F (36.9 °C) 71 17 99 %   11/02/20 2000 (!) 105/48 98.8 °F (37.1 °C) 71 13 100 %   11/02/20 1900 (!) 116/56 98.8 °F (37.1 °C) 82 21    11/02/20 1800 (!) 122/53 98.5 °F (36.9 °C) 83 22 100 %   11/02/20 1700 (!) 107/50 98.5 °F (36.9 °C) 69 15 99 %   11/02/20 1600 (!) 108/59 98.9 °F (37.2 °C) 72 14 99 %   11/02/20 1530 (!) 92/43 99.1 °F (37.3 °C) 73 14 99 %   11/02/20 1500 (!) 94/48 99 °F (37.2 °C) 76 14 100 %   11/02/20 1430 (!) 102/45 99 °F (37.2 °C) 71 16 99 %   11/02/20 1400 (!) 106/47 99 °F (37.2 °C) 78 18 100 %   11/02/20 1300 (!) 108/51 99 °F (37.2 °C) 75 14 100 %   11/02/20 1200 (!) 109/51 99 °F (37.2 °C) 76 17 100 %   11/02/20 1130 (!) 108/52 99.1 °F (37.3 °C) 82 21 99 %   11/02/20 1100 (!) 104/44 99.1 °F (37.3 °C) 75 15 100 %   11/02/20 1030 121/63 99 °F (37.2 °C) 81 19 100 %   11/02/20 1015 (!) 122/52 98.9 °F (37.2 °C) 75 8 100 %   11/02/20 1000 134/81 98.9 °F (37.2 °C) 81 17 96 %   11/02/20 0945 (!) 113/59 98.7 °F (37.1 °C) 76 17 100 %   11/02/20 0930 (!) 117/57 98.5 °F (36.9 °C) 73 16 99 %   11/02/20 0915 (!) 119/51 98.5 °F (36.9 °C) 71 12 99 %   11/02/20 0900 (!) 122/51 98.5 °F (36.9 °C) 74 9 99 %     11/03 0701 - 11/03 1900  In: 1062.5 [I.V.:1062.5]  Out: 275 [Urine:275]  11/01 1901 - 11/03 0700  In: 3799.8 [P.O.:840; I.V.:2959.8]  Out: 1333 [Urine:1333]    Physical Exam:   Visit Vitals  BP (!) 139/52 (BP 1 Location: Left arm, BP Patient Position: At rest)   Pulse 81   Temp 98.4 °F (36.9 °C)   Resp 19   Ht 5' 3\" (1.6 m)   Wt 133 lb 9.6 oz (60.6 kg)   SpO2 97%   BMI 23.67 kg/m²     General appearance: alert, cooperative, no distress, appears stated age  Neck: supple, symmetrical, trachea midline, no adenopathy, thyroid: not enlarged, symmetric, no tenderness/mass/nodules, no carotid bruit and no JVD  Lungs: clear to auscultation bilaterally  Heart: regular rate and rhythm, S1, S2 normal, no murmur, click, rub or gallop  Abdomen: soft, non-tender. Bowel sounds normal. No masses,  no organomegaly  Extremities: edema L leg    Assessment:     Active Problems:    DKA (diabetic ketoacidoses) ([facility] (6/2/2017)      Severe dehydration (10/31/2020)      Septic shock ([facility] (10/31/2020)        Plan: 1. Sepsis improving. Comments noted from ID. 2.Hydration is adequate. 3.Cont diabetic control.   4.Can transfer out if OK with Pul.

## 2020-11-03 NOTE — DIABETES MGMT
Patient's diabetes management per Dr. Lex Castaneda. Needs outpatient assistance with medication management to prevent readmission. Signing off.     Sierra Bolanos DNP, RN, ACNS-BC, BC-ADM, CDE  Clinical Nurse Specialist-Diabetes & Endocrine disorders  [facility] (Ambulatory \"Specialty\" practice)  (y) [phone] (ket) [phone]

## 2020-11-03 NOTE — PROGRESS NOTES
Care Management:    ALEKSANDAR: To move in with her DTR Jc    Per notes Medicaid aides have been approved and Jc  is working on meeting them and getting them started. Open to [facility] for SN, PT , OT and SW. concentrate of medication and diabetes management. Second IM letter. Follow up appointments. Reason for Readmission:  DKA and recovering in the ICU. She was here at [facility] 10/20- 10/25 with DKA. She has dementia. RUR Score/Risk Level:    61     PCP: First and Last name:  Rebeca Brochure   Name of Practice:    Are you a current patient: Yes   Approximate date of last visit:    Can you participate in a virtual visit with your PCP: YES with DTR's assistance. Is a Care Conference indicated:   IDR's    Did you attend your follow up appointment (s): If not, why not:  [facility] and they were concerned about patients care in the home. Was not home long enough to get to her PCP appointment. Resources/supports as identified by patient/family:   Diana Silva seems to be the child who is more engaged and willing to care for her mom. Top Challenges facing patient (as identified by patient/family and CM): Finances/Medication cost?  She has Medicare and Medicaid. She uses [facility]. Transportation    [place]     Living arrangements? She has been living with two sons . I spoke with her DTR Jc Wills on the phone this afternoon and she says that is ending . She is planning to bring the patient to her home to live so she can take care of her. Jc Wills ( DTR )   Address: [address] [address]          Self-care/ADL's/Cognition? Patient needs assistance with all aspects of ADL's including food prep and medication management. Jc  has been working on aides through [facility]. A UAI was completed last month. Aides have been approved for 5 days a week. Jc  is now working on securing  the aides and getting them started with her mom.  She would love 5 days a week for 8 hours but is willing to do 4 hour days if needed. She has a list of agencies and is making calls but right now aides are in short supply and she is having difficulty . With patient moving in with Stephen Rangel and with aides hoping  to start soon this may be the support she needs to be successful at home. Current Advanced Directive/Advance Care Plan:  Full Code and her children are her NOK . Plan for utilizing home health:   She is open to [facility]. Care Management Interventions  PCP Verified by CM: Yes  Mode of Transport at Discharge: Other (see comment)(DTR [facility])  Transition of Care Consult (CM Consult): Home Health(Open to [facility] for SN, PT, OT and SW.)  [address]: Yes  Current Support Network: Other(Has been living with two of her sons and not getting the support she needs.  No DME. )  Confirm Follow Up Transport: Family([facility])     Randy Orellana RN [facility]05

## 2020-11-03 NOTE — PROGRESS NOTES
Pharmacy Automatic Renal Dosing Protocol - Antimicrobials    Indication for Antimicrobials: Sepsis, bacteremia    Current Regimen of Each Antimicrobial:  Vancmycin  650mg IV q24h - started  day 3    Previous Antimicrobial Therapy:  Zosyn 3.375gm IV q8h - started  day 2    Vancomycin  Trough Goal Level:   15-20 (AUC: 400 - 600 mg/hr/Liter/day)     Date Dose & Interval Measured (mcg/mL) Extrapolated (mcg/mL)   11/3 00:04 650mg IV q24h 7.9                  Significant Cultures:   10/31 PBCx - GPCs 2 of 4 - pending  10/31 urine: ng final   bcx:    Radiology / Imaging results: (X-ray, CT scan or MRI): -    Paralysis, amputations, malnutrition: none    Labs:  Recent Labs     20  0004 20  0423 20  1555  20  0412   CREA 0.97 0.93 0.97   < > 1.72*   BUN 14 19 21*   < > 28*   WBC 7.6 14.3*  --   --  18.2*    < > = values in this interval not displayed. Temp (24hrs), Av.7 °F (37.1 °C), Min:98.1 °F (36.7 °C), Max:99.6 °F (37.6 °C)      Is the Patient on Dialysis? No    Creatinine Clearance (mL/min):   CrCl (Actual Body Weight): 45.7  CrCl (Adjusted Body Weight): 42.0  CrCl (Ideal Body Weight): 39.5    Impression/Plan:   ID consulted/following  SCr improved the last 2 days, afebrile, Leukocytosis resolved  Vancomycin subtherapeutic which is a result of  SCr normalized  Adjusted Vancomycin regimen to 500mg (8.3mg/kg) IV q12h for a projected trough range of 16.2 at hospitals  CBC and BMP daily already ordered  Antimicrobial sop date - pending     Pharmacy will follow daily and adjust medications as appropriate for renal function and/or serum levels.     Thank you,  Anne Verdugo, [facility]

## 2020-11-03 NOTE — PROGRESS NOTES
* No surgery found *  * No surgery found *  Bedside and Verbal shift change report given to [address] (oncoming nurse) by Aguilar Russell (offgoing nurse). Report included the following information SBAR, Kardex and ED Summary.     Zone Phone:   [phone]      Significant changes during shift:  Pt admitted to [facility], admission database completed, purewick placed, patient voided         Patient Information    Roseanna Borjas  67 y.o.  10/31/2020  8:30 PM by Natalie Caal MD. Roseanna Borjas was admitted from Home    Problem List    Patient Active Problem List    Diagnosis Date Noted    Vascular dementia without behavioral disturbance ([address] 10/20/2018     Priority: 3 - Three    Severe dehydration 10/31/2020    Septic shock ([address] 10/31/2020    Hyperkalemia 10/20/2020    DKA, type 1 ([address] 10/20/2020    Severe sepsis ([address] 10/20/2020    Candidiasis 10/20/2020    Dehydration 09/16/2020    Diabetic keto-acidosis ([address] 09/16/2020    Lactic acid acidosis 09/16/2020    ONEYDA (acute kidney injury) ([address] 09/16/2020    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 ([address] 08/31/2020    Hypoglycemia 06/21/2020    Hyperglycemia due to type 1 diabetes mellitus ([address] 02/27/2020    Venous insufficiency 01/16/2020    Anemia 12/04/2019    Acute metabolic encephalopathy 99/37/4683    H/O: CVA (cerebrovascular accident) 11/06/2018    DKA (diabetic ketoacidoses) ([address] 06/02/2017    Dyslipidemia 02/06/2015    Hypertension 02/06/2015    Hypothyroidism 02/06/2015    Memory deficit 02/06/2015     Past Medical History:   Diagnosis Date    Diabetes ([address]     Heart failure ([address]     unknown to family    Hypertension     Stroke ([address]          Core Measures:    CVA: No No  CHF:No No  PNA:No No      Activity Status:    OOB to Chair No  Ambulated this shift No   Bed Rest Yes    Supplemental O2: (If Applicable)    NC No  NRB No  Venti-mask No  On  Liters/min      LINES AND DRAINS:    PIV    DVT prophylaxis:    DVT prophylaxis Med- Yes  DVT prophylaxis SCD or CONCHITA- No     Wounds: (If Applicable)    Wounds- Yes    Location  Crack in the crack    Patient Safety:    Falls Score Total Score: 3  Safety Level_______  Bed Alarm On? Yes  Sitter?  No    Plan for upcoming shift: safety, antibiotics        Discharge Plan: Yes TBD      Active Consults:  IP CONSULT TO HOSPITALIST  IP CONSULT TO INFECTIOUS DISEASES

## 2020-11-03 NOTE — PROGRESS NOTES
TRANSFER - IN REPORT:    Verbal report received from [address] [place] [address] 20 (name) on Hanna Rico  being received from CCU (unit) for routine progression of care      Report consisted of patients Situation, Background, Assessment and   Recommendations(SBAR). Information from the following report(s) SBAR, Kardex and ED Summary was reviewed with the receiving nurse. Opportunity for questions and clarification was provided. Assessment completed upon patients arrival to unit and care assumed.

## 2020-11-03 NOTE — PROGRESS NOTES
0700: report received from Maty Dallas, [facility]    0830: assessment complete. See flowsheets    1661: ID rounds held. Orders placed    1230: Reassessment complete.  See flowsheets    [phone] [id number]: report given to Doroteo Titus RN for patient going to [phone]

## 2020-11-03 NOTE — PROGRESS NOTES
19: 15-Received report from [address] Updated about changes over the course of day shift, and POC. 20:00-Complex assessment completed, pt turned, and resting comfortably in bed. 00:00-Reassessed no changes to pt assessment. 04:00-Assessment unchanged, CHG bath given, and linens changed. 07:00-Report given to Mee Briseno RN.

## 2020-11-03 NOTE — CONSULTS
Infectious Disease Consult  Ana Lilia Dietz MD FAC    Date of Consultation:  November 2, 2020  Date of Admission: 10/31/2020   Referring Physician: Dr Lance Jensen:     Patient is a 67 y.o. female who is being seen for -  Significance and duration of treatment for positive   blood culture        IMPRESSION:   · Sepsis  · Gram-positive bacteremia, blood culture 10/31-2/4, ID/sensitivities pending  · Shock septic vs hypovolemic, s/p fluids and pressors  · DKA resolved  · ONEYDA with creatinine 1.81 on admission, currently 0.93  · Multiple admissions for poorly controlled blood sugars/DKA  · Dementia  · History of CVA  · History of thyroidectomy on Synthroid       PLAN:      · Continue Vancomycin IV, DC Zosyn  · Await ID, sensitivities on GPC  · Repeat blood cultures x2, ECHO- evaluate for vegetation. · Duration of antibiotic would be dependent on organism, usually 2 weeks is recommended duration of therapy,  for CoNS 7 to 14 days if no seeding. ·  Patient has had numerous admissions recently secondary to poorly controlled blood sugars and DKA10/20-10/25, 9/16-9/21, 9/01-9/04, 8/25-8/29, 5/23-5/28. Poor family support, care at home has been identified. Recurrent admissions also places patient at high risk of hospital-acquired infections. · Patient remains at high risk for readmission if she is discharged to same home environment and care situation. Cecy Menezes is seen today at request of Dr Rishi Tony for-  Significance and duration of treatment for positive blood    Cultures. The patient is a 67years old female  with past medical history of diabetes mellitus, hypertension, CVA, dementia who presented to emergency department due to elevated blood sugar around 1200. Per Ed note,on  arrival to the ER, patient was found to be in DKA & was started on DKA protocol.   Her blood pressure had  in the ED for which she was given IV fluid bolus 30 cc/kg with no response, then she was started on IV vasopressors .Triple-lumen catheter in the right IJ was placed by the ED physician. As per history her daughter Jayla Diego patient had thought pt forgot to take her insulin and her blood sugar had been  elevated for today prior to the hospital.  Patient had blood cultures drawn on 10/31, now positive for gram-positive cocci 2/4. Urine cultureNG <1000, UA -unremarkable except yeast, few epithelial cells, WBC 10-20. Patient seen today in ICU. She is off pressors.   Awake and oriented x3  Of note patient has had multiple admissions recently for unstable blood sugars, DKA last admission 10/20-10/25, before that 9/16-9/21, 9/01-9/04  Patient Active Problem List   Diagnosis Code    Dyslipidemia E78.5    Hypertension I10    Hypothyroidism E03.9    Memory deficit R41.3    DKA (diabetic ketoacidoses) ([facility] E11.10    Vascular dementia without behavioral disturbance (HCC) F01.50    H/O: CVA (cerebrovascular accident) Z80.78    Acute metabolic encephalopathy P52.06    Anemia D64.9    Venous insufficiency I87.2    Hyperglycemia due to type 1 diabetes mellitus (HCC) E10.65    Hypoglycemia E16.2    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 (HCC) E10.649    Dehydration E86.0    Diabetic keto-acidosis (HCC) E11.10    Lactic acid acidosis E87.2    ONEYDA (acute kidney injury) ([facility] N17.9    Hyperkalemia E87.5    DKA, type 1 (HCC) E10.10    Severe sepsis (HCC) A41.9, R65.20    Candidiasis B37.9    Severe dehydration E86.0    Septic shock (HCC) A41.9, R65.21     Past Medical History:   Diagnosis Date    Diabetes ([facility]     Heart failure ([facility]     unknown to family    Hypertension     Stroke ([facility]       Family History   Problem Relation Age of Onset    Diabetes Father     No Known Problems Mother       Social History     Tobacco Use    Smoking status: Never Smoker    Smokeless tobacco: Never Used   Substance Use Topics    Alcohol use: No     Comment: been years     Past Surgical History:   Procedure Laterality Date    HX GYN      HX HEENT      thyroidectomy    HX HYSTERECTOMY      REMOVAL GALLBLADDER      THYROIDECTOMY        Prior to Admission medications    Medication Sig Start Date End Date Taking? Authorizing Provider   insulin lispro (HUMALOG) 100 unit/mL kwikpen 2 units before breakfast and lunch only 10/25/20  Yes Aramis Mckay MD   amLODIPine (NORVASC) 10 mg tablet Take 1 Tab by mouth daily. 10/5/20  Yes Aramis Mckay MD   metoprolol succinate (TOPROL-XL) 25 mg XL tablet TAKE 1 TABLET BY MOUTH EVERY DAY 20  Yes Aramis Mckay MD   pravastatin (PRAVACHOL) 80 mg tablet TAKE 1 TABLET BY MOUTH at bedtime 20  Yes Aramis Mckay MD   levothyroxine (SYNTHROID) 175 mcg tablet TAKE 1 TABLET BY MOUTH EVERY DAY 20  Yes Aramis Mckay MD   clopidogreL (PLAVIX) 75 mg tab TAKE 1 TABLET BY MOUTH EVERY DAY 20  Yes Aramis Mckay MD   insulin degludec Suzen Galas FlexTouch U-100) 100 unit/mL (3 mL) inpn 12 Units by SubCUTAneous route daily. 10/25/20   Aramis Mckay MD   zinc oxide 20 % ointment Apply 1 Applicator to affected area two (2) times a day. thin layer gluteal cleft    Provider, Historical   losartan-hydroCHLOROthiazide (HYZAAR) 100-25 mg per tablet Take 1 Tab by mouth daily. Provider, Historical   brimonidine (ALPHAGAN) 0.15 % ophthalmic solution Administer 1 Drop to both eyes two (2) times a day. 1/30/15   Provider, Historical     No Known Allergies     Review of Systems:  A comprehensive review of systems was negative except for that written in the History of Present Illness. 10 point review of systems obtained . All other systems negative    Objective:   Blood pressure (!) 111/58, pulse 69, temperature 98.4 °F (36.9 °C), resp. rate 16, height 5' 3\" (1.6 m), weight 133 lb 9.6 oz (60.6 kg), SpO2 100 %.   Temp (24hrs), Av.7 °F (37.1 °C), Min:98.1 °F (36.7 °C), Max:99.6 °F (37.6 °C)    Current Facility-Administered Medications   Medication Dose Route Frequency    insulin glargine (LANTUS) injection 16 Units  16 Units SubCUTAneous DAILY    insulin lispro (HUMALOG) injection 2 Units  2 Units SubCUTAneous ACB    insulin lispro (HUMALOG) injection 3 Units  3 Units SubCUTAneous ACL    lactobac ac& pc-s.therm-b.anim (GALILEA Q/RISAQUAD)  1 Cap Oral DAILY    dextrose 5% - 0.9% NaCl with KCl 40 mEq/L infusion   IntraVENous CONTINUOUS    [START ON 11/3/2020] Vancomycin - please send trough before starting dose due at 1am  1 Each Other ONCE    PHENYLephrine (NEOSYNEPHRINE) 0.4 mg/10 mL (40 mcg/mL) in NS syringe        vancomycin (VANCOCIN) 650 mg in 0.9% sodium chloride 250 mL IVPB  650 mg IntraVENous Q24H    alcohol 62% (NOZIN) nasal  1 Ampule  1 Ampule Topical Q12H    glucose chewable tablet 16 g  4 Tab Oral PRN    dextrose (D50W) injection syrg 12.5-25 g  12.5-25 g IntraVENous PRN    glucagon (GLUCAGEN) injection 1 mg  1 mg IntraMUSCular PRN    clopidogreL (PLAVIX) tablet 75 mg  75 mg Oral DAILY    levothyroxine (SYNTHROID) tablet 175 mcg  175 mcg Oral 6am    pravastatin (PRAVACHOL) tablet 80 mg  80 mg Oral DAILY    sodium chloride (NS) flush 5-40 mL  5-40 mL IntraVENous Q8H    sodium chloride (NS) flush 5-40 mL  5-40 mL IntraVENous PRN    acetaminophen (TYLENOL) tablet 650 mg  650 mg Oral Q6H PRN    Or    acetaminophen (TYLENOL) suppository 650 mg  650 mg Rectal Q6H PRN    polyethylene glycol (MIRALAX) packet 17 g  17 g Oral DAILY PRN    promethazine (PHENERGAN) tablet 12.5 mg  12.5 mg Oral Q6H PRN    Or    ondansetron (ZOFRAN) injection 4 mg  4 mg IntraVENous Q6H PRN    enoxaparin (LOVENOX) injection 40 mg  40 mg SubCUTAneous DAILY    piperacillin-tazobactam (ZOSYN) 3.375 g in 0.9% sodium chloride (MBP/ADV) 100 mL  3.375 g IntraVENous Q8H        Exam:    General:  Awake, cooperative,   Eyes:  Sclera anicteric. Pupils equally round and reactive to light.    Mouth/Throat: Mucous membranes normal, oral pharynx clear   Neck: Supple   Lungs:   reduced auscultation bases   CV:  Regular rate and rhythm,no murmur, click, rub or gallop   Abdomen:   Soft, non-tender. bowel sounds normal. non-distended   Extremities: No  edema   Skin: Skin color, texture, turgor normal. no skin breakdown   Lymph nodes: Cervical and supraclavicular normal   Musculoskeletal: No swelling or deformity   Lines/Devices:  Intact, no erythema, drainage or tenderness   Psych: Awake and oriented, normal mood affect       Data Reviewed:     Lab Results   Component Value Date/Time    Culture result: No growth (<1,000 CFU/ML) 10/31/2020 11:16 PM    Culture result: (A) 10/31/2020 08:10 PM     GRAM POSITIVE COCCI IN CLUSTERS GROWING IN 2 OF 4 BOTTLES DRAWN . ..(SITE NOT INDICATED)    Culture result: REMAINING BOTTLE(S) HAS/HAVE NO GROWTH SO FAR 10/31/2020 08:10 PM    Culture result: NO GROWTH 6 DAYS 10/20/2020 05:50 PM    Culture result: NO GROWTH 6 DAYS 10/20/2020 05:50 PM          XR Results (most recent):  Results from Hospital Encounter encounter on 10/31/20   XR CHEST PORT    Narrative EXAM:  XR CHEST PORT    INDICATION: Central line placement    COMPARISON: 10/31/2020 at 2108 hours    TECHNIQUE: Portable AP semiupright chest view at 2337 hours    FINDINGS: A right IJ catheter terminates in profile with the SVC. Cardiac  monitoring wires overlie the thorax. The cardiomediastinal contours are stable. The pulmonary vasculature is within normal limits. The lungs and pleural spaces are clear. There is no pneumothorax. The bones and  upper abdomen are stable. Impression IMPRESSION:    No pneumothorax following right IJ catheter placement. Clear lungs. ICD-10-CM ICD-9-CM    1. Diabetic ketoacidosis with coma associated with type 1 diabetes mellitus (HCC)  E10.11 250.33    2. Acute kidney injury ([facility] [address]  N17.9 584.9    3. Hypotension, unspecified hypotension type  I95.9 458.9    4.  Metabolic acidosis  X48.6 584.0            Antibiotic History  Vancomycin , Zosyn IV  I have discussed the diagnosis with the patient and the intended plan as seen in the above orders. I have discussed medication side effects and warnings with the patient as well.     Reviewed test results at length with patient    Signed By: Olivia Osborn MD     November 2, 2020

## 2020-11-03 NOTE — PROGRESS NOTES
Infectious Disease Progress  Sammy Mai MD FAC    Date of Consultation:  November 3, 2020  Date of Admission: 10/31/2020   Referring Physician: Dr Chandana Acosta:   · Sepsis stable  · Coagulase-negative staph bacteremia, blood culture 10/31-2/4, sensitivities requested, pending  · Repeat blood cultures11/2 NG  · Shock septic vs hypovolemic, s/p fluids and pressors  · DKA resolved  · ONEYDA with creatinine 1.81 on admission, currently 0.93  · Multiple admissions for poorly controlled blood sugars/DKA  · Dementia  · History of CVA  · History of thyroidectomy on Synthroid       PLAN:      · Continue Vancomycin IV, status post DC of Zosyn  · Await sensitivities on coagulase-negative staph  ·  ECHO- evaluate for vegetation. · Duration of antibiotic would be dependent on organism,for CoNS 7 to 14 days if no seeding. ·  Patient has had numerous admissions recently secondary to poorly controlled blood sugars and DKA10/20-10/25, 9/16-9/21, 9/01-9/04, 8/25-8/29, 5/23-5/28. Poor family support, care at home has been identified. Recurrent admissions also places patient at high risk of hospital-acquired infections. · Patient remains at high risk for readmission if she is discharged to same home environment and care situation. Patient seen earlier today. No complaints except\" I want to go home\". The patient is a 67years old female  with past medical history of diabetes mellitus, hypertension, CVA, dementia who presented to emergency department due to elevated blood sugar around 1200. Per Ed note,on  arrival to the ER, patient was found to be in DKA & was started on DKA protocol. Her blood pressure had  in the ED for which she was given IV fluid bolus 30 cc/kg with no response, then she was started on IV vasopressors . Triple-lumen catheter in the right IJ was placed by the ED physician.   As per history her daughter Varghese Case patient had thought pt forgot to take her insulin and her blood sugar had been elevated for today prior to the hospital.  Patient had blood cultures drawn on 10/31, now positive for gram-positive cocci 2/4. Urine cultureNG <1000, UA -unremarkable except yeast, few epithelial cells, WBC 10-20. Patient seen today in ICU. She is off pressors.   Awake and oriented x3  Of note patient has had multiple admissions recently for unstable blood sugars, DKA last admission 10/20-10/25, before that 9/16-9/21, 9/01-9/04  Patient Active Problem List   Diagnosis Code    Dyslipidemia E78.5    Hypertension I10    Hypothyroidism E03.9    Memory deficit R41.3    DKA (diabetic ketoacidoses) ([facility] [address] E11.10    Vascular dementia without behavioral disturbance (HCC) F01.50    H/O: CVA (cerebrovascular accident) Z80.78    Acute metabolic encephalopathy Y85.10    Anemia D64.9    Venous insufficiency I87.2    Hyperglycemia due to type 1 diabetes mellitus (HCC) E10.65    Hypoglycemia E16.2    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 (HCC) E10.649    Dehydration E86.0    Diabetic keto-acidosis (HCC) E11.10    Lactic acid acidosis E87.2    ONEYDA (acute kidney injury) ([facility] N17.9    Hyperkalemia E87.5    DKA, type 1 (HCC) E10.10    Severe sepsis ([facility]) A41.9, R65.20    Candidiasis B37.9    Severe dehydration E86.0    Septic shock ([facility]) A41.9, R65.21     Past Medical History:   Diagnosis Date    Diabetes ([facility]     [facility]     unknown to family    Hypertension     St[facility]       Family History   Problem Relation Age of Onset    Diabetes Father     No Known Problems Mother       Social History     Tobacco Use    Smoking status: Never Smoker    Smokeless tobacco: Never Used   Substance Use Topics    Alcohol use: No     Comment: been years     Past Surgical History:   Procedure Laterality Date    HX GYN      HX HEENT      thyroidectomy    HX HYSTERECTOMY      REMOVAL GALLBLADDER      THYROIDECTOMY        Prior to Admission medications    Medication Sig Start Date End Date Taking? Authorizing Provider   insulin degludec Deepika Bee FlexTouch U-100) 100 unit/mL (3 mL) inpn 12 Units by SubCUTAneous route daily. 10/25/20  Yes Edwin De La Torre MD   insulin lispro (HUMALOG) 100 unit/mL kwikpen 2 units before breakfast and lunch only 10/25/20  Yes Edwin De La Torre MD   amLODIPine (NORVASC) 10 mg tablet Take 1 Tab by mouth daily. 10/5/20  Yes Edwin De La Torre MD   losartan-hydroCHLOROthiazide [facility]) 100-25 mg per tablet Take 1 Tab by mouth daily. Yes Provider, Historical   metoprolol succinate (TOPROL-XL) 25 mg XL tablet TAKE 1 TABLET BY MOUTH EVERY DAY 20  Yes Edwin De La Torre MD   pravastatin (PRAVACHOL) 80 mg tablet TAKE 1 TABLET BY MOUTH at bedtime 20  Yes Edwin De La Torre MD   levothyroxine (SYNTHROID) 175 mcg tablet TAKE 1 TABLET BY MOUTH EVERY DAY 20  Yes Edwin De La Torre MD   clopidogreL (PLAVIX) 75 mg tab TAKE 1 TABLET BY MOUTH EVERY DAY 20  Yes Edwin De La Torre MD   brimonidine (ALPHAGAN) 0.15 % ophthalmic solution Administer 1 Drop to both eyes two (2) times a day. 1/30/15  Yes Provider, Historical   zinc oxide 20 % ointment Apply 1 Applicator to affected area two (2) times a day. thin layer gluteal cleft    Provider, Historical     No Known Allergies     Review of Systems:  A comprehensive review of systems was negative except for that written in the History of Present Illness. 10 point review of systems obtained . All other systems negative    Objective:   Blood pressure (!) 134/58, pulse 76, temperature 97.5 °F (36.4 °C), resp. rate 16, height 5' 3\" (1.6 m), weight 133 lb 9.6 oz (60.6 kg), SpO2 100 %.   Temp (24hrs), Av.7 °F (37.1 °C), Min:97.5 °F (36.4 °C), Max:99.5 °F (37.5 °C)    Current Facility-Administered Medications   Medication Dose Route Frequency    vancomycin (VANCOCIN) 500 mg in 0.9% sodium chloride (MBP/ADV) 100 mL  500 mg IntraVENous Q12H    dextrose 5 % - 0.45% NaCl 1,000 mL with potassium chloride 30 mEq infusion   IntraVENous CONTINUOUS    [START ON 11/4/2020] Vancomycin - please send level before starting dose due wed 11/4 at 1300  1 Each Other ONCE    insulin glargine (LANTUS) injection 16 Units  16 Units SubCUTAneous DAILY    insulin lispro (HUMALOG) injection 2 Units  2 Units SubCUTAneous ACB    insulin lispro (HUMALOG) injection 3 Units  3 Units SubCUTAneous ACL    lactobac ac& pc-s.therm-b.anim (GALILEA Q/RISAQUAD)  1 Cap Oral DAILY    alcohol 62% (NOZIN) nasal  1 Ampule  1 Ampule Topical Q12H    glucose chewable tablet 16 g  4 Tab Oral PRN    dextrose (D50W) injection syrg 12.5-25 g  12.5-25 g IntraVENous PRN    glucagon (GLUCAGEN) injection 1 mg  1 mg IntraMUSCular PRN    clopidogreL (PLAVIX) tablet 75 mg  75 mg Oral DAILY    levothyroxine (SYNTHROID) tablet 175 mcg  175 mcg Oral 6am    pravastatin (PRAVACHOL) tablet 80 mg  80 mg Oral DAILY    sodium chloride (NS) flush 5-40 mL  5-40 mL IntraVENous Q8H    sodium chloride (NS) flush 5-40 mL  5-40 mL IntraVENous PRN    acetaminophen (TYLENOL) tablet 650 mg  650 mg Oral Q6H PRN    Or    acetaminophen (TYLENOL) suppository 650 mg  650 mg Rectal Q6H PRN    polyethylene glycol (MIRALAX) packet 17 g  17 g Oral DAILY PRN    promethazine (PHENERGAN) tablet 12.5 mg  12.5 mg Oral Q6H PRN    Or    ondansetron (ZOFRAN) injection 4 mg  4 mg IntraVENous Q6H PRN    enoxaparin (LOVENOX) injection 40 mg  40 mg SubCUTAneous DAILY        Exam:    General:  Awake, cooperative,   Eyes:  Sclera anicteric. Pupils equally round and reactive to light. Mouth/Throat: Mucous membranes normal, oral pharynx clear   Neck: Supple   Lungs:   reduced auscultation bases   CV:  Regular rate and rhythm,no murmur, click, rub or gallop   Abdomen:   Soft, non-tender.  bowel sounds normal. non-distended   Extremities: No  edema   Skin: Skin color, texture, turgor normal. no skin breakdown   Lymph nodes: Cervical and supraclavicular normal   Musculoskeletal: No swelling or deformity Lines/Devices:  Intact, no erythema, drainage or tenderness   Psych: Awake and oriented, normal mood affect       Data Reviewed:     Lab Results   Component Value Date/Time    Culture result: NO GROWTH AFTER 20 HOURS 11/02/2020 10:47 AM    Culture result: No growth (<1,000 CFU/ML) 10/31/2020 11:16 PM    Culture result: (A) 10/31/2020 08:10 PM     STAPHYLOCOCCUS SPECIES, COAGULASE NEGATIVE GROWING IN 2 OF 4 BOTTLES DRAWN IDENTIFICATION AND SUSCEPTIBILITY TO FOLLOW    Culture result: REMAINING BOTTLE(S) HAS/HAVE NO GROWTH SO FAR 10/31/2020 08:10 PM    Culture result: NO GROWTH 6 DAYS 10/20/2020 05:50 PM    Culture result: NO GROWTH 6 DAYS 10/20/2020 05:50 PM          XR Results (most recent):  Results from Hospital Encounter encounter on 10/31/20   XR CHEST PORT    Narrative EXAM:  XR CHEST PORT    INDICATION: Central line placement    COMPARISON: 10/31/2020 at 2108 hours    TECHNIQUE: Portable AP semiupright chest view at 2337 hours    FINDINGS: A right IJ catheter terminates in profile with the SVC. Cardiac  monitoring wires overlie the thorax. The cardiomediastinal contours are stable. The pulmonary vasculature is within normal limits. The lungs and pleural spaces are clear. There is no pneumothorax. The bones and  upper abdomen are stable. Impression IMPRESSION:    No pneumothorax following right IJ catheter placement. Clear lungs. ICD-10-CM ICD-9-CM    1. Diabetic ketoacidosis with coma associated with type 1 diabetes mellitus (HCC)  E10.11 250.33    2. Acute kidney injury ([facility] [address] [address]  N17.9 584.9    3. Hypotension, unspecified hypotension type  I95.9 458.9    4. Metabolic acidosis  F66.8 961.8    5. Diabetic ketoacidosis without coma associated with type 2 diabetes mellitus (HCC)  E11.10 250.12    6. Gram-positive cocci bacteremia  R78.81 790.7      041.89    7.  Sepsis with acute renal failure and renal cortical necrosis, due to unspecified organism, unspecified whether septic shock present ([facility]  A41.9 038.9     R65.20 995.91     N17.1 584.6    8. Diabetic ketoacidosis without coma associated with type 1 diabetes mellitus (HCC)  E10.10 250.13    9. ONEYDA (acute kidney injury) ([facility]  N17.9 584.9    10. Acute metabolic encephalopathy  B26.10 348.31    11. Dehydration  E86.0 276.51            Antibiotic History  Vancomycin , Zosyn IV  I have discussed the diagnosis with the patient and the intended plan as seen in the above orders. I have discussed medication side effects and warnings with the patient as well.     Reviewed test results at length with patient    Signed By: Amanda Mornoy MD     November 3, 2020

## 2020-11-04 ENCOUNTER — APPOINTMENT (OUTPATIENT)
Dept: NON INVASIVE DIAGNOSTICS | Age: 80
DRG: 871 | End: 2020-11-04
Attending: INTERNAL MEDICINE
Payer: MEDICARE

## 2020-11-04 LAB
ANION GAP SERPL CALC-SCNC: 6 MMOL/L (ref 5–15)
BASOPHILS # BLD: 0 K/UL (ref 0–0.1)
BASOPHILS NFR BLD: 1 % (ref 0–1)
BLASTS NFR BLD MANUAL: 0 %
BUN SERPL-MCNC: 9 MG/DL (ref 6–20)
BUN/CREAT SERPL: 12 (ref 12–20)
CALCIUM SERPL-MCNC: 7.4 MG/DL (ref 8.5–10.1)
CHLORIDE SERPL-SCNC: 109 MMOL/L (ref 97–108)
CO2 SERPL-SCNC: 23 MMOL/L (ref 21–32)
CREAT SERPL-MCNC: 0.76 MG/DL (ref 0.55–1.02)
DATE LAST DOSE: NORMAL
DIFFERENTIAL METHOD BLD: ABNORMAL
ECHO AO ROOT DIAM: 3.16 CM
ECHO AV AREA PEAK VELOCITY: 1.89 CM2
ECHO AV AREA VTI: 1.95 CM2
ECHO AV AREA/BSA PEAK VELOCITY: 1.2 CM2/M2
ECHO AV AREA/BSA VTI: 1.2 CM2/M2
ECHO AV MEAN GRADIENT: 4.63 MMHG
ECHO AV PEAK GRADIENT: 8.97 MMHG
ECHO AV PEAK VELOCITY: 149.76 CM/S
ECHO AV VTI: 26.48 CM
ECHO EST RA PRESSURE: 10 MMHG
ECHO LA AREA 4C: 12.94 CM2
ECHO LA MAJOR AXIS: 3.25 CM
ECHO LA MINOR AXIS: 2 CM
ECHO LA VOL 4C: 35.27 ML (ref 22–52)
ECHO LA VOLUME INDEX A4C: 21.69 ML/M2 (ref 16–28)
ECHO LV E' LATERAL VELOCITY: 8.68 CM/S
ECHO LV E' SEPTAL VELOCITY: 7.73 CM/S
ECHO LV EDV A4C: 51.75 ML
ECHO LV EDV INDEX A4C: 31.8 ML/M2
ECHO LV EJECTION FRACTION A4C: 32 PERCENT
ECHO LV ESV A4C: 35.14 ML
ECHO LV ESV INDEX A4C: 21.6 ML/M2
ECHO LV INTERNAL DIMENSION DIASTOLIC: 3.85 CM (ref 3.9–5.3)
ECHO LV INTERNAL DIMENSION SYSTOLIC: 2.27 CM
ECHO LV IVSD: 1.29 CM (ref 0.6–0.9)
ECHO LV MASS 2D: 175.3 G (ref 67–162)
ECHO LV MASS INDEX 2D: 107.8 G/M2 (ref 43–95)
ECHO LV POSTERIOR WALL DIASTOLIC: 1.3 CM (ref 0.6–0.9)
ECHO LVOT CARDIAC OUTPUT: 3.62 LITER/MINUTE
ECHO LVOT DIAM: 1.96 CM
ECHO LVOT PEAK GRADIENT: 3.52 MMHG
ECHO LVOT PEAK VELOCITY: 93.8 CM/S
ECHO LVOT SV: 51.7 ML
ECHO LVOT VTI: 17.09 CM
ECHO MV A VELOCITY: 115.37 CM/S
ECHO MV AREA PHT: 2.47 CM2
ECHO MV AREA VTI: 2.19 CM2
ECHO MV E DECELERATION TIME (DT): 305.98 MS
ECHO MV E VELOCITY: 70.15 CM/S
ECHO MV E/A RATIO: 0.61
ECHO MV E/E' LATERAL: 8.08
ECHO MV E/E' RATIO (AVERAGED): 8.58
ECHO MV E/E' SEPTAL: 9.08
ECHO MV MAX VELOCITY: 95.08 CM/S
ECHO MV MEAN GRADIENT: 1.31 MMHG
ECHO MV PEAK GRADIENT: 3.62 MMHG
ECHO MV PRESSURE HALF TIME (PHT): 89 MS
ECHO MV VTI: 23.62 CM
ECHO PV MAX VELOCITY: 104.75 CM/S
ECHO PV MEAN GRADIENT: 2.03 MMHG
ECHO PV PEAK INSTANTANEOUS GRADIENT SYSTOLIC: 4.39 MMHG
ECHO PV VTI: 22.41 CM
ECHO TV REGURGITANT MAX VELOCITY: 87.78 CM/S
EOSINOPHIL # BLD: 0 K/UL (ref 0–0.4)
EOSINOPHIL NFR BLD: 0 % (ref 0–7)
ERYTHROCYTE [DISTWIDTH] IN BLOOD BY AUTOMATED COUNT: 17.8 % (ref 11.5–14.5)
GLUCOSE BLD STRIP.AUTO-MCNC: 182 MG/DL (ref 65–100)
GLUCOSE BLD STRIP.AUTO-MCNC: 194 MG/DL (ref 65–100)
GLUCOSE BLD STRIP.AUTO-MCNC: 51 MG/DL (ref 65–100)
GLUCOSE BLD STRIP.AUTO-MCNC: 54 MG/DL (ref 65–100)
GLUCOSE BLD STRIP.AUTO-MCNC: 63 MG/DL (ref 65–100)
GLUCOSE SERPL-MCNC: 208 MG/DL (ref 65–100)
HCT VFR BLD AUTO: 31.7 % (ref 35–47)
HGB BLD-MCNC: 10.3 G/DL (ref 11.5–16)
IMM GRANULOCYTES # BLD AUTO: 0 K/UL
IMM GRANULOCYTES NFR BLD AUTO: 0 %
LVOT MG: 1.43 MMHG
LYMPHOCYTES # BLD: 1 K/UL (ref 0.8–3.5)
LYMPHOCYTES NFR BLD: 24 % (ref 12–49)
MCH RBC QN AUTO: 30.6 PG (ref 26–34)
MCHC RBC AUTO-ENTMCNC: 32.5 G/DL (ref 30–36.5)
MCV RBC AUTO: 94.1 FL (ref 80–99)
METAMYELOCYTES NFR BLD MANUAL: 0 %
MONOCYTES # BLD: 0.2 K/UL (ref 0–1)
MONOCYTES NFR BLD: 5 % (ref 5–13)
MYELOCYTES NFR BLD MANUAL: 0 %
NEUTS BAND NFR BLD MANUAL: 0 % (ref 0–6)
NEUTS SEG # BLD: 2.9 K/UL (ref 1.8–8)
NEUTS SEG NFR BLD: 70 % (ref 32–75)
NRBC # BLD: 0 K/UL (ref 0–0.01)
NRBC BLD-RTO: 0 PER 100 WBC
OTHER CELLS NFR BLD MANUAL: 0 %
PLATELET # BLD AUTO: 231 K/UL (ref 150–400)
PMV BLD AUTO: 9.7 FL (ref 8.9–12.9)
POTASSIUM SERPL-SCNC: 4.5 MMOL/L (ref 3.5–5.1)
PROMYELOCYTES NFR BLD MANUAL: 0 %
RBC # BLD AUTO: 3.37 M/UL (ref 3.8–5.2)
RBC MORPH BLD: ABNORMAL
REPORTED DOSE,DOSE: NORMAL UNITS
REPORTED DOSE/TIME,TMG: NORMAL
SERVICE CMNT-IMP: ABNORMAL
SODIUM SERPL-SCNC: 138 MMOL/L (ref 136–145)
VANCOMYCIN TROUGH SERPL-MCNC: 7.6 UG/ML (ref 5–10)
WBC # BLD AUTO: 4.1 K/UL (ref 3.6–11)

## 2020-11-04 PROCEDURE — 36415 COLL VENOUS BLD VENIPUNCTURE: CPT

## 2020-11-04 PROCEDURE — 74011636637 HC RX REV CODE- 636/637: Performed by: INTERNAL MEDICINE

## 2020-11-04 PROCEDURE — 74011000258 HC RX REV CODE- 258: Performed by: INTERNAL MEDICINE

## 2020-11-04 PROCEDURE — 74011250637 HC RX REV CODE- 250/637: Performed by: INTERNAL MEDICINE

## 2020-11-04 PROCEDURE — 74011250636 HC RX REV CODE- 250/636: Performed by: INTERNAL MEDICINE

## 2020-11-04 PROCEDURE — 3331090002 HH PPS REVENUE DEBIT

## 2020-11-04 PROCEDURE — 3331090001 HH PPS REVENUE CREDIT

## 2020-11-04 PROCEDURE — 82962 GLUCOSE BLOOD TEST: CPT

## 2020-11-04 PROCEDURE — 65660000000 HC RM CCU STEPDOWN

## 2020-11-04 PROCEDURE — 93306 TTE W/DOPPLER COMPLETE: CPT

## 2020-11-04 PROCEDURE — 80048 BASIC METABOLIC PNL TOTAL CA: CPT

## 2020-11-04 PROCEDURE — 93306 TTE W/DOPPLER COMPLETE: CPT | Performed by: INTERNAL MEDICINE

## 2020-11-04 PROCEDURE — 99232 SBSQ HOSP IP/OBS MODERATE 35: CPT | Performed by: INTERNAL MEDICINE

## 2020-11-04 PROCEDURE — 85027 COMPLETE CBC AUTOMATED: CPT

## 2020-11-04 PROCEDURE — 80202 ASSAY OF VANCOMYCIN: CPT

## 2020-11-04 RX ADMIN — POTASSIUM CHLORIDE: 2 INJECTION, SOLUTION, CONCENTRATE INTRAVENOUS at 22:26

## 2020-11-04 RX ADMIN — Medication 10 ML: at 22:23

## 2020-11-04 RX ADMIN — INSULIN LISPRO 2 UNITS: 100 INJECTION, SOLUTION INTRAVENOUS; SUBCUTANEOUS at 08:34

## 2020-11-04 RX ADMIN — INSULIN GLARGINE 16 UNITS: 100 INJECTION, SOLUTION SUBCUTANEOUS at 08:33

## 2020-11-04 RX ADMIN — VANCOMYCIN HYDROCHLORIDE 500 MG: 500 INJECTION, POWDER, LYOPHILIZED, FOR SOLUTION INTRAVENOUS at 15:58

## 2020-11-04 RX ADMIN — LEVOTHYROXINE SODIUM 175 MCG: 0.03 TABLET ORAL at 05:35

## 2020-11-04 RX ADMIN — CLOPIDOGREL BISULFATE 75 MG: 75 TABLET ORAL at 08:34

## 2020-11-04 RX ADMIN — POTASSIUM CHLORIDE: 2 INJECTION, SOLUTION, CONCENTRATE INTRAVENOUS at 10:34

## 2020-11-04 RX ADMIN — Medication 1 CAPSULE: at 08:34

## 2020-11-04 RX ADMIN — Medication 1 AMPULE: at 09:00

## 2020-11-04 RX ADMIN — VANCOMYCIN HYDROCHLORIDE 500 MG: 500 INJECTION, POWDER, LYOPHILIZED, FOR SOLUTION INTRAVENOUS at 01:10

## 2020-11-04 RX ADMIN — Medication 1 AMPULE: at 22:22

## 2020-11-04 RX ADMIN — INSULIN LISPRO 3 UNITS: 100 INJECTION, SOLUTION INTRAVENOUS; SUBCUTANEOUS at 11:59

## 2020-11-04 RX ADMIN — PRAVASTATIN SODIUM 80 MG: 40 TABLET ORAL at 08:34

## 2020-11-04 RX ADMIN — ENOXAPARIN SODIUM 40 MG: 40 INJECTION SUBCUTANEOUS at 08:34

## 2020-11-04 RX ADMIN — Medication 10 ML: at 15:57

## 2020-11-04 RX ADMIN — Medication 10 ML: at 05:36

## 2020-11-04 NOTE — PROGRESS NOTES
eusebio message   FROM [facility]   KELLY Reyes 1940    3105/01   MESSAGE  patient blood sugar 55. Patient had not eaten all day and wanted to eat. No Dextrose given. Patient ate a hot meal.  Blood glucose now 89. Patient said he wanted to eat instead of getting the dextrose since the reason for her low sugar was not eating. Do you still want me to give the dextrose? Thanks! NEEDS CALL BACK:  Needs call back       Called nurse back: Yes         Discussion / orders:  Reviewed patient medication. She is on Lantus 16 units daily in a.m., lispro 2 units daily before breakfast and 3 units daily before lunch which she has received today per nursing documentation in the [facility]. Patient nurse states she is not sure whether patient has eaten much today as patient was very hungry and wanted to eat when she got to the unit. Nurse states that patient has had a hard meal and a banana pudding and a brownie. Stated to patient's nurse that since patient has just eaten and that she has no more insulin due tonight and since she has been admitted for DKA, to hold off on giving her the dextrose at this time as her blood sugar is coming up after she has eaten. I asked to be given a bedtime snack as well. Please note that this note was dictated using Dragon computer voice recognition software. Quite often unanticipated grammatical, syntax, homophones, and other interpretive errors are inadvertently transcribed by the computer software. Please disregard these errors. Please excuse any errors that have escaped final proofreading.

## 2020-11-04 NOTE — PROGRESS NOTES
Patient has an ordered vancomycin trough due at 1pm, before starting next dose of vancomycin. PICC team has been called and they will do the lab draw.  RN will hold off on the scheduled vancomycin until the lab has been drawn

## 2020-11-04 NOTE — PROGRESS NOTES
Pharmacy Automatic Renal Dosing Protocol - Antimicrobials    Indication for Antimicrobials: bacteremia, source? Current Regimen of Each Antimicrobial:  Vancmycin 500mg IV q12h - started  day 3    Previous Antimicrobial Therapy:  Zosyn 3.375gm IV q8h - started  day 2    Vancomycin  Trough Goal Level:   15-20 (AUC: 400 - 600 mg/hr/Liter/day)     Date Dose & Interval Measured (mcg/mL) Extrapolated (mcg/mL)   11/3 00:04 650mg IV q24h 7.9     1300 500 mg q12h 7.6            Significant Cultures:   10/31 PBCx - coag neg staph  10/31 UCx - ng final   PBCx - ng pending    Radiology / Imaging results: (X-ray, CT scan or MRI): -    Paralysis, amputations, malnutrition: none    Labs:  Recent Labs     20  0420 20  0004 20  0423   CREA 0.76 0.97 0.93   BUN 9 14 19   WBC 4.1 7.6 14.3*     Temp (24hrs), Av.9 °F (36.6 °C), Min:97.5 °F (36.4 °C), Max:98.1 °F (36.7 °C)      Is the Patient on Dialysis? No    Creatinine Clearance (mL/min):   CrCl (Actual Body Weight): 58.1  CrCl (Adjusted Body Weight): 53.5  CrCl (Ideal Body Weight): 50.5    Impression/Plan:   Coag neg staph - contaminant? ID consulted/following - still to update today. ID noted possible echo to look for vegetation. Will see if ID still wants to treat. Repeat blood cultures still early  Vancomycin trough 7.6 on 500 mg Q12H. Serum creatinine declining. Dose change to 750 mg Q12H with anticipated trough of approximately 13.6 mcg/ml. Ordered trough for tomorrow afternoon which hopefully will result before day shift leaves  Antimicrobial sop date - pending     Pharmacy will follow daily and adjust medications as appropriate for renal function and/or serum levels.     Thank you,  Kip Collet, [facility]

## 2020-11-04 NOTE — PROGRESS NOTES
* No surgery found *  * No surgery found *  Bedside and Verbal shift change report given to Codey RN (oncoming nurse) by Henrry Guerra RN (offgoing nurse). Report included the following information SBAR, Kardex and ED Summary.     Zone Phone:   [phone]      Significant changes during shift:  Echo completed    Patient Information    Sade Rachel  67 y.o.  10/31/2020  8:30 PM by Mary Baltazar MD. Sade Rachel was admitted from Home    Problem List    Patient Active Problem List    Diagnosis Date Noted    Vascular dementia without behavioral disturbance ([address] 10/20/2018     Priority: 3 - Three    Severe dehydration 10/31/2020    Septic shock ([address] 10/31/2020    Hyperkalemia 10/20/2020    DKA, type 1 ([address] 10/20/2020    Severe sepsis ([address] 10/20/2020    Candidiasis 10/20/2020    Dehydration 09/16/2020    Diabetic keto-acidosis ([address] 09/16/2020    Lactic acid acidosis 09/16/2020    ONEYDA (acute kidney injury) ([address] 09/16/2020    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 ([address] 08/31/2020    Hypoglycemia 06/21/2020    Hyperglycemia due to type 1 diabetes mellitus ([address] 02/27/2020    Venous insufficiency 01/16/2020    Anemia 12/04/2019    Acute metabolic encephalopathy 90/80/3894    H/O: CVA (cerebrovascular accident) 11/06/2018    DKA (diabetic ketoacidoses) ([address] 06/02/2017    Dyslipidemia 02/06/2015    Hypertension 02/06/2015    Hypothyroidism 02/06/2015    Memory deficit 02/06/2015     Past Medical History:   Diagnosis Date    Diabetes ([address]     Heart failure ([address]     unknown to family    Hypertension     Stroke ([address]          Core Measures:    CVA: No No  CHF:No No  PNA:No No      Activity Status:    OOB to Chair No  Ambulated this shift Yes  Bed Rest No    Supplemental O2: (If Applicable)    NC No  NRB No  Venti-mask No  On  Liters/min      LINES AND DRAINS:    PIV    DVT prophylaxis:    DVT prophylaxis Med- Yes  DVT prophylaxis SCD or CONCHITA- No     Wounds: (If Applicable)    Wounds- Yes    Location  Crack in the crack    Patient Safety:    Falls Score Total Score: 3  Safety Level_______  Bed Alarm On? Yes  Sitter?  No    Plan for upcoming shift: safety, antibiotics        Discharge Plan: Yes, home with home health when ready       Active Consults:  IP CONSULT TO HOSPITALIST  IP CONSULT TO INFECTIOUS DISEASES

## 2020-11-04 NOTE — PROGRESS NOTES
General Daily Progress Note    Admit Date: 10/31/2020    Subjective:     Patient has no complaint . Nicci Cogan Current Facility-Administered Medications   Medication Dose Route Frequency    0.45% sodium chloride 1,000 mL with potassium chloride 20 mEq infusion   IntraVENous CONTINUOUS    vancomycin (VANCOCIN) 500 mg in 0.9% sodium chloride (MBP/ADV) 100 mL  500 mg IntraVENous Q12H    Vancomycin - please send level before starting dose due wed 11/4 at 1300  1 Each Other ONCE    insulin glargine (LANTUS) injection 16 Units  16 Units SubCUTAneous DAILY    insulin lispro (HUMALOG) injection 2 Units  2 Units SubCUTAneous ACB    insulin lispro (HUMALOG) injection 3 Units  3 Units SubCUTAneous ACL    lactobac ac& pc-s.therm-b.anim (GALILEA Q/RISAQUAD)  1 Cap Oral DAILY    alcohol 62% (NOZIN) nasal  1 Ampule  1 Ampule Topical Q12H    glucose chewable tablet 16 g  4 Tab Oral PRN    dextrose (D50W) injection syrg 12.5-25 g  12.5-25 g IntraVENous PRN    glucagon (GLUCAGEN) injection 1 mg  1 mg IntraMUSCular PRN    clopidogreL (PLAVIX) tablet 75 mg  75 mg Oral DAILY    levothyroxine (SYNTHROID) tablet 175 mcg  175 mcg Oral 6am    pravastatin (PRAVACHOL) tablet 80 mg  80 mg Oral DAILY    sodium chloride (NS) flush 5-40 mL  5-40 mL IntraVENous Q8H    sodium chloride (NS) flush 5-40 mL  5-40 mL IntraVENous PRN    acetaminophen (TYLENOL) tablet 650 mg  650 mg Oral Q6H PRN    Or    acetaminophen (TYLENOL) suppository 650 mg  650 mg Rectal Q6H PRN    polyethylene glycol (MIRALAX) packet 17 g  17 g Oral DAILY PRN    promethazine (PHENERGAN) tablet 12.5 mg  12.5 mg Oral Q6H PRN    Or    ondansetron (ZOFRAN) injection 4 mg  4 mg IntraVENous Q6H PRN    enoxaparin (LOVENOX) injection 40 mg  40 mg SubCUTAneous DAILY        Review of Systems  A comprehensive review of systems was negative.     Objective:     Patient Vitals for the past 24 hrs:   BP Temp Pulse Resp SpO2   11/04/20 0831 (!) 146/67 97.7 °F (36.5 °C) 79 20 100 %   11/04/20 0400 (!) 154/62 98 °F (36.7 °C) 76 18 98 %   11/04/20 0000 138/69 97.7 °F (36.5 °C) 77 20 96 %   11/03/20 2000 (!) 121/56 97.5 °F (36.4 °C) 74 20 96 %   11/03/20 1544 (!) 134/58 97.5 °F (36.4 °C) 76 16 100 %   11/03/20 1430 130/63 99.5 °F (37.5 °C) 81 14 100 %   11/03/20 1415  99.4 °F (37.4 °C) 78 18 92 %   11/03/20 1400 131/66 99.4 °F (37.4 °C) 78 21 100 %   11/03/20 1345  99.4 °F (37.4 °C) 90 21 100 %   11/03/20 1330  99.5 °F (37.5 °C) 82 16 99 %   11/03/20 1315  99.5 °F (37.5 °C) 82 18 100 %   11/03/20 1300 123/77 99.4 °F (37.4 °C) 75 18 100 %   11/03/20 1245  99.4 °F (37.4 °C) 74 16 100 %   11/03/20 1230 132/65 99.3 °F (37.4 °C) 78 15 100 %   11/03/20 1200 130/64 99.1 °F (37.3 °C) 76 19 100 %   11/03/20 1130 129/60 99 °F (37.2 °C) 82 23 100 %   11/03/20 1100 126/64 98.8 °F (37.1 °C) 73 17 100 %   11/03/20 1030 124/63 98.7 °F (37.1 °C) 75 19 99 %   11/03/20 1000 109/85 98.6 °F (37 °C) 76 18 98 %   11/03/20 0930 127/70 98.5 °F (36.9 °C) 79 17    11/03/20 0900 (!) 116/93 98.4 °F (36.9 °C) 87 23 100 %     No intake/output data recorded. 11/02 1901 - 11/04 0700  In: 3264.2 [P.O.:480; I.V.:2784.2]  Out: 2035 [Urine:2035]    Physical Exam:   Visit Vitals  BP (!) 146/67   Pulse 79   Temp 97.7 °F (36.5 °C)   Resp 20   Ht 5' 3\" (1.6 m)   Wt 133 lb 9.6 oz (60.6 kg)   SpO2 100%   BMI 23.67 kg/m²     General appearance: alert, cooperative, no distress, appears stated age  Neck: supple, symmetrical, trachea midline, no adenopathy, thyroid: not enlarged, symmetric, no tenderness/mass/nodules, no carotid bruit and no JVD  Lungs: clear to auscultation bilaterally  Heart: regular rate and rhythm, S1, S2 normal, no murmur, click, rub or gallop  Abdomen: soft, non-tender.  Bowel sounds normal. No masses,  no organomegaly  Extremities: edema trace    Assessment:     Active Problems:    DKA (diabetic ketoacidoses) ([facility] (6/2/2017)      Severe dehydration (10/31/2020)      Septic shock ([address] (10/31/2020)        Plan:     1. Continue parenteral antibiotic for staph septicemia. Await echocardiogram.  Per ID. 2.  Continue diabetic control. 3.  Hydration is adequate.

## 2020-11-04 NOTE — PROGRESS NOTES
Problem: Diabetes Self-Management  Goal: *Disease process and treatment process  Description: Define diabetes and identify own type of diabetes; list 3 options for treating diabetes. Outcome: Progressing Towards Goal  Goal: *Incorporating nutritional management into lifestyle  Description: Describe effect of type, amount and timing of food on blood glucose; list 3 methods for planning meals. Outcome: Progressing Towards Goal  Goal: *Incorporating physical activity into lifestyle  Description: State effect of exercise on blood glucose levels. Outcome: Progressing Towards Goal  Goal: *Developing strategies to promote health/change behavior  Description: Define the ABC's of diabetes; identify appropriate screenings, schedule and personal plan for screenings. Outcome: Progressing Towards Goal  Goal: *Using medications safely  Description: State effect of diabetes medications on diabetes; name diabetes medication taking, action and side effects. Outcome: Progressing Towards Goal  Goal: *Monitoring blood glucose, interpreting and using results  Description: Identify recommended blood glucose targets  and personal targets. Outcome: Progressing Towards Goal  Goal: *Prevention, detection, treatment of acute complications  Description: List symptoms of hyper- and hypoglycemia; describe how to treat low blood sugar and actions for lowering  high blood glucose level. Outcome: Progressing Towards Goal  Goal: *Prevention, detection and treatment of chronic complications  Description: Define the natural course of diabetes and describe the relationship of blood glucose levels to long term complications of diabetes.   Outcome: Progressing Towards Goal  Goal: *Developing strategies to address psychosocial issues  Description: Describe feelings about living with diabetes; identify support needed and support network  Outcome: Progressing Towards Goal  Goal: *Insulin pump training  Outcome: Progressing Towards Goal  Goal: *Sick day guidelines  Outcome: Progressing Towards Goal  Goal: *Patient Specific Goal (EDIT GOAL, INSERT TEXT)  Outcome: Progressing Towards Goal     Problem: Patient Education: Go to Patient Education Activity  Goal: Patient/Family Education  Outcome: Progressing Towards Goal     Problem: Falls - Risk of  Goal: *Absence of Falls  Description: Document Denia Evans Fall Risk and appropriate interventions in the flowsheet. Outcome: Progressing Towards Goal  Note: Fall Risk Interventions:  Mobility Interventions: Bed/chair exit alarm, Patient to call before getting OOB, Utilize gait belt for transfers/ambulation    Mentation Interventions: Adequate sleep, hydration, pain control, Bed/chair exit alarm, Door open when patient unattended, Gait belt with transfers/ambulation, Increase mobility, More frequent rounding, Toileting rounds, Update white board    Medication Interventions: Bed/chair exit alarm, Patient to call before getting OOB, Teach patient to arise slowly, Utilize gait belt for transfers/ambulation    Elimination Interventions: Bed/chair exit alarm, Call light in reach, Patient to call for help with toileting needs, Toileting schedule/hourly rounds              Problem: Patient Education: Go to Patient Education Activity  Goal: Patient/Family Education  Outcome: Progressing Towards Goal     Problem: Pressure Injury - Risk of  Goal: *Prevention of pressure injury  Description: Document Madhu Scale and appropriate interventions in the flowsheet.   Outcome: Progressing Towards Goal  Note: Pressure Injury Interventions:  Sensory Interventions: Assess changes in LOC    Moisture Interventions: Absorbent underpads, Apply protective barrier, creams and emollients, Internal/External urinary devices, Minimize layers    Activity Interventions: Chair cushion, Pressure redistribution bed/mattress(bed type)    Mobility Interventions: Chair cushion, Float heels, HOB 30 degrees or less    Nutrition Interventions: Document food/fluid/supplement intake    Friction and Shear Interventions: Minimize layers                Problem: Patient Education: Go to Patient Education Activity  Goal: Patient/Family Education  Outcome: Progressing Towards Goal

## 2020-11-04 NOTE — PROGRESS NOTES
Order received and acknowledge. Pt currently off the floor. Observed pt ambulating to stretcher with transport. Will continue to follow.

## 2020-11-05 ENCOUNTER — PATIENT OUTREACH (OUTPATIENT)
Dept: CASE MANAGEMENT | Age: 80
End: 2020-11-05

## 2020-11-05 ENCOUNTER — APPOINTMENT (OUTPATIENT)
Dept: GENERAL RADIOLOGY | Age: 80
DRG: 871 | End: 2020-11-05
Attending: INTERNAL MEDICINE
Payer: MEDICARE

## 2020-11-05 LAB
ANION GAP SERPL CALC-SCNC: 5 MMOL/L (ref 5–15)
BASOPHILS # BLD: 0 K/UL (ref 0–0.1)
BASOPHILS NFR BLD: 0 % (ref 0–1)
BLASTS NFR BLD MANUAL: 0 %
BNP SERPL-MCNC: 206 PG/ML
BUN SERPL-MCNC: 10 MG/DL (ref 6–20)
BUN/CREAT SERPL: 22 (ref 12–20)
CALCIUM SERPL-MCNC: 7 MG/DL (ref 8.5–10.1)
CHLORIDE SERPL-SCNC: 108 MMOL/L (ref 97–108)
CO2 SERPL-SCNC: 23 MMOL/L (ref 21–32)
CREAT SERPL-MCNC: 0.46 MG/DL (ref 0.55–1.02)
DIFFERENTIAL METHOD BLD: ABNORMAL
EOSINOPHIL # BLD: 0.1 K/UL (ref 0–0.4)
EOSINOPHIL NFR BLD: 4 % (ref 0–7)
ERYTHROCYTE [DISTWIDTH] IN BLOOD BY AUTOMATED COUNT: 17.4 % (ref 11.5–14.5)
GLUCOSE BLD STRIP.AUTO-MCNC: 161 MG/DL (ref 65–100)
GLUCOSE BLD STRIP.AUTO-MCNC: 173 MG/DL (ref 65–100)
GLUCOSE BLD STRIP.AUTO-MCNC: 270 MG/DL (ref 65–100)
GLUCOSE BLD STRIP.AUTO-MCNC: 64 MG/DL (ref 65–100)
GLUCOSE BLD STRIP.AUTO-MCNC: 85 MG/DL (ref 65–100)
GLUCOSE BLD STRIP.AUTO-MCNC: 92 MG/DL (ref 65–100)
GLUCOSE SERPL-MCNC: 38 MG/DL (ref 65–100)
HCT VFR BLD AUTO: 28.3 % (ref 35–47)
HGB BLD-MCNC: 9.6 G/DL (ref 11.5–16)
IMM GRANULOCYTES # BLD AUTO: 0 K/UL
IMM GRANULOCYTES NFR BLD AUTO: 0 %
LYMPHOCYTES # BLD: 1.2 K/UL (ref 0.8–3.5)
LYMPHOCYTES NFR BLD: 33 % (ref 12–49)
MCH RBC QN AUTO: 31.6 PG (ref 26–34)
MCHC RBC AUTO-ENTMCNC: 33.9 G/DL (ref 30–36.5)
MCV RBC AUTO: 93.1 FL (ref 80–99)
METAMYELOCYTES NFR BLD MANUAL: 0 %
MONOCYTES # BLD: 0.3 K/UL (ref 0–1)
MONOCYTES NFR BLD: 8 % (ref 5–13)
MYELOCYTES NFR BLD MANUAL: 0 %
NEUTS BAND NFR BLD MANUAL: 0 % (ref 0–6)
NEUTS SEG # BLD: 2 K/UL (ref 1.8–8)
NEUTS SEG NFR BLD: 55 % (ref 32–75)
NRBC # BLD: 0.02 K/UL (ref 0–0.01)
NRBC BLD-RTO: 0.6 PER 100 WBC
OTHER CELLS NFR BLD MANUAL: 0 %
PLATELET # BLD AUTO: 186 K/UL (ref 150–400)
PMV BLD AUTO: 9.5 FL (ref 8.9–12.9)
POTASSIUM SERPL-SCNC: 4.9 MMOL/L (ref 3.5–5.1)
PROMYELOCYTES NFR BLD MANUAL: 0 %
RBC # BLD AUTO: 3.04 M/UL (ref 3.8–5.2)
RBC MORPH BLD: ABNORMAL
SERVICE CMNT-IMP: ABNORMAL
SERVICE CMNT-IMP: NORMAL
SERVICE CMNT-IMP: NORMAL
SODIUM SERPL-SCNC: 136 MMOL/L (ref 136–145)
WBC # BLD AUTO: 3.6 K/UL (ref 3.6–11)

## 2020-11-05 PROCEDURE — 74011250636 HC RX REV CODE- 250/636: Performed by: INTERNAL MEDICINE

## 2020-11-05 PROCEDURE — 36415 COLL VENOUS BLD VENIPUNCTURE: CPT

## 2020-11-05 PROCEDURE — 99232 SBSQ HOSP IP/OBS MODERATE 35: CPT | Performed by: INTERNAL MEDICINE

## 2020-11-05 PROCEDURE — 85027 COMPLETE CBC AUTOMATED: CPT

## 2020-11-05 PROCEDURE — 74011000258 HC RX REV CODE- 258: Performed by: INTERNAL MEDICINE

## 2020-11-05 PROCEDURE — 82962 GLUCOSE BLOOD TEST: CPT

## 2020-11-05 PROCEDURE — 97116 GAIT TRAINING THERAPY: CPT

## 2020-11-05 PROCEDURE — 83880 ASSAY OF NATRIURETIC PEPTIDE: CPT

## 2020-11-05 PROCEDURE — 80048 BASIC METABOLIC PNL TOTAL CA: CPT

## 2020-11-05 PROCEDURE — 74011250637 HC RX REV CODE- 250/637: Performed by: INTERNAL MEDICINE

## 2020-11-05 PROCEDURE — 71045 X-RAY EXAM CHEST 1 VIEW: CPT

## 2020-11-05 PROCEDURE — 74011636637 HC RX REV CODE- 636/637: Performed by: INTERNAL MEDICINE

## 2020-11-05 PROCEDURE — 65660000000 HC RM CCU STEPDOWN

## 2020-11-05 PROCEDURE — 97162 PT EVAL MOD COMPLEX 30 MIN: CPT

## 2020-11-05 RX ORDER — INSULIN GLARGINE 100 [IU]/ML
12 INJECTION, SOLUTION SUBCUTANEOUS DAILY
Status: DISCONTINUED | OUTPATIENT
Start: 2020-11-05 | End: 2020-11-06

## 2020-11-05 RX ORDER — INSULIN LISPRO 100 [IU]/ML
2 INJECTION, SOLUTION INTRAVENOUS; SUBCUTANEOUS
Status: DISCONTINUED | OUTPATIENT
Start: 2020-11-05 | End: 2020-11-07

## 2020-11-05 RX ADMIN — Medication 10 ML: at 14:00

## 2020-11-05 RX ADMIN — INSULIN LISPRO 2 UNITS: 100 INJECTION, SOLUTION INTRAVENOUS; SUBCUTANEOUS at 08:31

## 2020-11-05 RX ADMIN — CLOPIDOGREL BISULFATE 75 MG: 75 TABLET ORAL at 08:31

## 2020-11-05 RX ADMIN — Medication 10 ML: at 21:20

## 2020-11-05 RX ADMIN — POTASSIUM CHLORIDE: 2 INJECTION, SOLUTION, CONCENTRATE INTRAVENOUS at 06:15

## 2020-11-05 RX ADMIN — ENOXAPARIN SODIUM 40 MG: 40 INJECTION SUBCUTANEOUS at 08:31

## 2020-11-05 RX ADMIN — LEVOTHYROXINE SODIUM 175 MCG: 0.03 TABLET ORAL at 06:15

## 2020-11-05 RX ADMIN — INSULIN GLARGINE 12 UNITS: 100 INJECTION, SOLUTION SUBCUTANEOUS at 08:31

## 2020-11-05 RX ADMIN — VANCOMYCIN HYDROCHLORIDE 750 MG: 750 INJECTION, POWDER, LYOPHILIZED, FOR SOLUTION INTRAVENOUS at 16:12

## 2020-11-05 RX ADMIN — VANCOMYCIN HYDROCHLORIDE 750 MG: 750 INJECTION, POWDER, LYOPHILIZED, FOR SOLUTION INTRAVENOUS at 03:53

## 2020-11-05 RX ADMIN — Medication 1 CAPSULE: at 08:31

## 2020-11-05 RX ADMIN — Medication 10 ML: at 06:15

## 2020-11-05 RX ADMIN — Medication 1 AMPULE: at 21:20

## 2020-11-05 RX ADMIN — PRAVASTATIN SODIUM 80 MG: 40 TABLET ORAL at 08:31

## 2020-11-05 RX ADMIN — INSULIN LISPRO 2 UNITS: 100 INJECTION, SOLUTION INTRAVENOUS; SUBCUTANEOUS at 11:51

## 2020-11-05 NOTE — PROGRESS NOTES
Infectious Disease Progress          IMPRESSION:   · Coagulase-negative staph bacteremia (Ox R) , blood culture+ 10/31-2/4 now identified as 2 colony types ? contaminant. In view of initial clinical presentation, also multiple recent hospitalizations, we will continue vancomycin IV for total of 7 days. · Repeat blood cultures11/2 NG.  · TTE negative for vegetation  · Shock septic vs hypovolemic, s/p fluids and pressors  · DKA resolved  · S/p ONEYDA   · Multiple admissions for poorly controlled blood sugars/DKA  · Dementia  · History of CVA  · History of thyroidectomy on Synthroid       PLAN:      ·  Vancomycin 750 mg IV every 12 x 7 days end date 11/8. Pharmacy dosing per protocol, target trough 15-20. Monitor creatinine. · Patient encouraged to drink more fluids  · Blood sugar control. Patient seen earlier today. No complaints except\" when can  go home\". No fever, chills. The patient is a 67years old female  with past medical history of diabetes mellitus, hypertension, CVA, dementia who presented to emergency department due to elevated blood sugar around 1200. Per Ed note,on  arrival to the ER, patient was found to be in DKA & was started on DKA protocol. Her blood pressure had  in the ED for which she was given IV fluid bolus 30 cc/kg with no response, then she was started on IV vasopressors . Triple-lumen catheter in the right IJ was placed by the ED physician. As per history her daughter Jocelyne Cruz patient had thought pt forgot to take her insulin and her blood sugar had been  elevated for today prior to the hospital.  Patient had blood cultures drawn on 10/31, now positive for gram-positive cocci 2/4. Urine cultureNG <1000, UA -unremarkable except yeast, few epithelial cells, WBC 10-20. Patient seen today in ICU. She is off pressors.   Awake and oriented x3  Of note patient has had multiple admissions recently for unstable blood sugars, DKA last admission 10/20-10/25, before that 9/16-9/21, 9/01-9/04  Patient Active Problem List   Diagnosis Code    Dyslipidemia E78.5    Hypertension I10    Hypothyroidism E03.9    Memory deficit R41.3    DKA (diabetic ketoacidoses) ([facility] E11.10    Vascular dementia without behavioral disturbance ([facility] F01.50    H/O: CVA (cerebrovascular accident) Z80.78    Acute metabolic encephalopathy E54.96    Anemia D64.9    Venous insufficiency I87.2    Hyperglycemia due to type 1 diabetes mellitus ([facility] E10.65    Hypoglycemia E16.2    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 (HCC) E10.649    Dehydration E86.0    Diabetic keto-acidosis ([facility]) E11.10    Lactic acid acidosis E87.2    ONEYDA (acute kidney injury) ([facility] N17.9    Hyperkalemia E87.5    DKA, type 1 ([facility]) E10.10    Severe sepsis ([facility]) A41.9, R65.20    Candidiasis B37.9    Severe dehydration E86.0    Septic shock ([facility]) A41.9, R65.21     Past Medical History:   Diagnosis Date    Diabetes ([facility]     Heart failure ([facility]     unknown to family    Hypertension     Stroke ([facility]       Family History   Problem Relation Age of Onset    Diabetes Father     No Known Problems Mother       Social History     Tobacco Use    Smoking status: Never Smoker    Smokeless tobacco: Never Used   Substance Use Topics    Alcohol use: No     Comment: been years     Past Surgical History:   Procedure Laterality Date    HX GYN      HX HEENT      thyroidectomy    HX HYSTERECTOMY      REMOVAL GALLBLADDER      THYROIDECTOMY        Prior to Admission medications    Medication Sig Start Date End Date Taking? Authorizing Provider   insulin degludec Mary Bias FlexTouch U-100) 100 unit/mL (3 mL) inpn 12 Units by SubCUTAneous route daily. 10/25/20  Yes Daysi Junior MD   insulin lispro (HUMALOG) 100 unit/mL kwikpen 2 units before breakfast and lunch only 10/25/20  Yes Daysi Junior MD   amLODIPine (NORVASC) 10 mg tablet Take 1 Tab by mouth daily.  10/5/20  Yes Daysi Junior MD   losartan-hydroCHLOROthiazide (HYZAAR) 100-25 mg per tablet Take 1 Tab by mouth daily. Yes Provider, Historical   metoprolol succinate (TOPROL-XL) 25 mg XL tablet TAKE 1 TABLET BY MOUTH EVERY DAY 20  Yes Daysi Junior MD   pravastatin (PRAVACHOL) 80 mg tablet TAKE 1 TABLET BY MOUTH at bedtime 20  Yes Daysi Junior MD   levothyroxine (SYNTHROID) 175 mcg tablet TAKE 1 TABLET BY MOUTH EVERY DAY 20  Yes Daysi Junior MD   clopidogreL (PLAVIX) 75 mg tab TAKE 1 TABLET BY MOUTH EVERY DAY 20  Yes Daysi Junior MD   brimonidine (ALPHAGAN) 0.15 % ophthalmic solution Administer 1 Drop to both eyes two (2) times a day. 1/30/15  Yes Provider, Historical   zinc oxide 20 % ointment Apply 1 Applicator to affected area two (2) times a day. thin layer gluteal cleft    Provider, Historical     No Known Allergies     Review of Systems:  A comprehensive review of systems was negative except for that written in the History of Present Illness. 10 point review of systems obtained . All other systems negative    Objective:   Blood pressure (!) 145/73, pulse 78, temperature 98.1 °F (36.7 °C), resp. rate 18, height 5' 3\" (1.6 m), weight 133 lb (60.3 kg), SpO2 100 %.   Temp (24hrs), Av.9 °F (36.6 °C), Min:97.7 °F (36.5 °C), Max:98.1 °F (36.7 °C)    Current Facility-Administered Medications   Medication Dose Route Frequency    0.45% sodium chloride 1,000 mL with potassium chloride 20 mEq infusion   IntraVENous CONTINUOUS    [START ON 2020] vancomycin (VANCOCIN) 750 mg in 0.9% sodium chloride (MBP/ADV) 250 mL  750 mg IntraVENous Q12H    insulin glargine (LANTUS) injection 16 Units  16 Units SubCUTAneous DAILY    insulin lispro (HUMALOG) injection 2 Units  2 Units SubCUTAneous ACB    insulin lispro (HUMALOG) injection 3 Units  3 Units SubCUTAneous ACL    lactobac ac& pc-s.therm-b.anim (GALILEA Q/RISAQUAD)  1 Cap Oral DAILY    alcohol 62% (NOZIN) nasal  1 Ampule  1 Ampule Topical Q12H    glucose chewable tablet 16 g  4 Tab Oral PRN    dextrose (D50W) injection syrg 12.5-25 g  12.5-25 g IntraVENous PRN    glucagon (GLUCAGEN) injection 1 mg  1 mg IntraMUSCular PRN    clopidogreL (PLAVIX) tablet 75 mg  75 mg Oral DAILY    levothyroxine (SYNTHROID) tablet 175 mcg  175 mcg Oral 6am    pravastatin (PRAVACHOL) tablet 80 mg  80 mg Oral DAILY    sodium chloride (NS) flush 5-40 mL  5-40 mL IntraVENous Q8H    sodium chloride (NS) flush 5-40 mL  5-40 mL IntraVENous PRN    acetaminophen (TYLENOL) tablet 650 mg  650 mg Oral Q6H PRN    Or    acetaminophen (TYLENOL) suppository 650 mg  650 mg Rectal Q6H PRN    polyethylene glycol (MIRALAX) packet 17 g  17 g Oral DAILY PRN    promethazine (PHENERGAN) tablet 12.5 mg  12.5 mg Oral Q6H PRN    Or    ondansetron (ZOFRAN) injection 4 mg  4 mg IntraVENous Q6H PRN    enoxaparin (LOVENOX) injection 40 mg  40 mg SubCUTAneous DAILY        Exam:    General:  Awake, cooperative,   Eyes:  Sclera anicteric. Pupils equally round and reactive to light. Mouth/Throat: Mucous membranes normal, oral pharynx clear   Neck: Supple   Lungs:   reduced auscultation bases   CV:  Regular rate and rhythm,no murmur, click, rub or gallop   Abdomen:   Soft, non-tender.  bowel sounds normal. non-distended   Extremities: No  edema   Skin: Skin color, texture, turgor normal. no skin breakdown   Lymph nodes: Cervical and supraclavicular normal   Musculoskeletal: No swelling or deformity   Lines/Devices:  Intact, no erythema, drainage or tenderness   Psych: Awake and oriented, normal mood affect       Data Reviewed:     Lab Results   Component Value Date/Time    Culture result: NO GROWTH 2 DAYS 11/02/2020 10:47 AM    Culture result: No growth (<1,000 CFU/ML) 10/31/2020 11:16 PM    Culture result: (A) 10/31/2020 08:10 PM     STAPHYLOCOCCUS EPIDERMIDIS (OXACILLIN RESISTANT) GROWING IN 1 OF 4 BOTTLES DRAWN (NO SITE INDICATED)    Culture result: (A) 10/31/2020 08:10 PM     STAPHYLOCOCCUS EPIDERMIDIS (OXACILLIN RESISTANT) (2ND COLONY TYPE/STRAIN) GROWING IN THE 2ND OF 4 BOTTLES DRAWN (NO SITE INDICATED)    Culture result: REMAINING BOTTLE(S) HAS/HAVE NO GROWTH SO FAR 10/31/2020 08:10 PM          XR Results (most recent):  Results from Hospital Encounter encounter on 10/31/20   XR CHEST PORT    Narrative EXAM:  XR CHEST PORT    INDICATION: Central line placement    COMPARISON: 10/31/2020 at 2108 hours    TECHNIQUE: Portable AP semiupright chest view at 2337 hours    FINDINGS: A right IJ catheter terminates in profile with the SVC. Cardiac  monitoring wires overlie the thorax. The cardiomediastinal contours are stable. The pulmonary vasculature is within normal limits. The lungs and pleural spaces are clear. There is no pneumothorax. The bones and  upper abdomen are stable. Impression IMPRESSION:    No pneumothorax following right IJ catheter placement. Clear lungs. ICD-10-CM ICD-9-CM    1. Diabetic ketoacidosis with coma associated with type 1 diabetes mellitus ([facility])  E10.11 250.33    2. Acute kidney injury ([facility]  N17.9 584.9    3. Hypotension, unspecified hypotension type  I95.9 458.9    4. Metabolic acidosis  Q08.7 920.0    5. Diabetic ketoacidosis without coma associated with type 2 diabetes mellitus ([facility])  E11.10 250.12    6. Gram-positive cocci bacteremia  R78.81 790.7      041.89    7. Sepsis with acute renal failure and renal cortical necrosis, due to unspecified organism, unspecified whether septic shock present ([facility]  A41.9 038.9     R65.20 995.91     N17.1 584.6    8. Diabetic ketoacidosis without coma associated with type 1 diabetes mellitus ([facility])  E10.10 250.13    9. ONEYDA (acute kidney injury) ([facility]  N17.9 584.9    10. Acute metabolic encephalopathy  Q20.39 348.31    11. Dehydration  E86.0 276.51    12. Coagulase negative Staphylococcus bacteremia  R78.81 790.7     B95.7 041.19    13.  Hypothyroidism due to medication  E03.2 244.8      E980.5            Antibiotic History  Vancomycin , Zosyn IV  I have discussed the diagnosis with the patient and the intended plan as seen in the above orders. I have discussed medication side effects and warnings with the patient as well.     Reviewed test results at length with patient    Signed By: Dirk Lim MD     November 4, 2020

## 2020-11-05 NOTE — PROGRESS NOTES
General Daily Progress Note    Admit Date: 10/31/2020    Subjective:     Patient has no complaint . Lety Jacksonfranklin Current Facility-Administered Medications   Medication Dose Route Frequency    insulin glargine (LANTUS) injection 12 Units  12 Units SubCUTAneous DAILY    insulin lispro (HUMALOG) injection 2 Units  2 Units SubCUTAneous ACL    vancomycin (VANCOCIN) 750 mg in 0.9% sodium chloride (MBP/ADV) 250 mL  750 mg IntraVENous Q12H    insulin lispro (HUMALOG) injection 2 Units  2 Units SubCUTAneous ACB    lactobac ac& pc-s.therm-b.anim (GALILEA Q/RISAQUAD)  1 Cap Oral DAILY    alcohol 62% (NOZIN) nasal  1 Ampule  1 Ampule Topical Q12H    glucose chewable tablet 16 g  4 Tab Oral PRN    dextrose (D50W) injection syrg 12.5-25 g  12.5-25 g IntraVENous PRN    glucagon (GLUCAGEN) injection 1 mg  1 mg IntraMUSCular PRN    clopidogreL (PLAVIX) tablet 75 mg  75 mg Oral DAILY    levothyroxine (SYNTHROID) tablet 175 mcg  175 mcg Oral 6am    pravastatin (PRAVACHOL) tablet 80 mg  80 mg Oral DAILY    sodium chloride (NS) flush 5-40 mL  5-40 mL IntraVENous Q8H    sodium chloride (NS) flush 5-40 mL  5-40 mL IntraVENous PRN    acetaminophen (TYLENOL) tablet 650 mg  650 mg Oral Q6H PRN    Or    acetaminophen (TYLENOL) suppository 650 mg  650 mg Rectal Q6H PRN    polyethylene glycol (MIRALAX) packet 17 g  17 g Oral DAILY PRN    promethazine (PHENERGAN) tablet 12.5 mg  12.5 mg Oral Q6H PRN    Or    ondansetron (ZOFRAN) injection 4 mg  4 mg IntraVENous Q6H PRN    enoxaparin (LOVENOX) injection 40 mg  40 mg SubCUTAneous DAILY        Review of Systems  A comprehensive review of systems was negative.     Objective:     Patient Vitals for the past 24 hrs:   BP Temp Pulse Resp SpO2 Height Weight   11/05/20 0740 (!) 136/57 98.4 °F (36.9 °C) 83 16 100 %     11/05/20 0402 128/68 98 °F (36.7 °C) 77 17 97 %     11/04/20 2342 123/69 97.7 °F (36.5 °C) 78 17 100 %     11/04/20 2000 (!) 125/57 98.1 °F (36.7 °C) 70 16 100 %   11/04/20 1516 (!) 145/73 98.1 °F (36.7 °C) 78 18 100 %     11/04/20 1229 (!) 155/70 98.1 °F (36.7 °C) 78 17 100 %     11/04/20 1022 (!) 146/67     5' 3\" (1.6 m) 133 lb (60.3 kg)     No intake/output data recorded. 11/03 1901 - 11/05 0700  In: 200 [I.V.:200]  Out: 1200 [Urine:1200]    Physical Exam:   Visit Vitals  BP (!) 136/57   Pulse 83   Temp 98.4 °F (36.9 °C)   Resp 16   Ht 5' 3\" (1.6 m)   Wt 133 lb (60.3 kg)   SpO2 100%   BMI 23.56 kg/m²     General appearance: alert, cooperative, no distress, appears stated age  Neck: supple, symmetrical, trachea midline, no adenopathy, thyroid: not enlarged, symmetric, no tenderness/mass/nodules, no carotid bruit and no JVD  Lungs: clear to auscultation bilaterally  Heart: regular rate and rhythm, S1, S2 normal, no murmur, click, rub or gallop  Abdomen: soft, non-tender. Bowel sounds normal. No masses,  no organomegaly  Extremities: edema 1+ L,trace R    Assessment: 1. Pt will need a total of 7 days of antibiotic therapy per ID. Probable contaminant. 2.Cont diabetic control. Electaed to give snacks rater than d/c prandial insulin. It makes no diff what is done in the hospital as far as diabetes concerned. 3.D/C parenteral fluids because of 3rd spacing.     Active Problems:    DKA (diabetic ketoacidoses) ([facility] (6/2/2017)      Severe dehydration (10/31/2020)      Septic shock ([facility] (10/31/2020)        Plan:     **

## 2020-11-05 NOTE — PROGRESS NOTES
* No surgery found *  * No surgery found *  Bedside and Verbal shift change report given to John Garcia RN (oncoming nurse) by VIN Alegre (offgoing nurse). Report included the following information SBAR, Kardex and ED Summary.     Zone Phone:   [phone]        Significant changes during shift:  Patient's blood glucose: 54. Orange juice given and rechecked B. Notified by [facility] lab that patient's blood glucose is 38. Md notified. Four glucose tablets given. Will recheck BG in 15 minutes. 7149- repeat B. 0450- patient's BG is 161.   Patient Information     Ivy Hummel  67 y.o.  10/31/2020  8:30 PM by Leda Vila MD. Ivy Hummel was admitted from Home     Problem List           Patient Active Problem List     Diagnosis Date Noted    Vascular dementia without behavioral disturbance ([address] 10/20/2018       Priority: 3 - Three    Severe dehydration 10/31/2020    Septic shock ([address] 10/31/2020    Hyperkalemia 10/20/2020    DKA, type 1 ([address] 10/20/2020    Severe sepsis ([address] 10/20/2020    Candidiasis 10/20/2020    Dehydration 2020    Diabetic keto-acidosis ([address] 2020    Lactic acid acidosis 2020    ONEYDA (acute kidney injury) ([address] 2020    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 ([address] 2020    Hypoglycemia 2020    Hyperglycemia due to type 1 diabetes mellitus ([address] 2020    Venous insufficiency 2020    Anemia 2019    Acute metabolic encephalopathy     H/O: CVA (cerebrovascular accident) 2018    DKA (diabetic ketoacidoses) ([address] 2017    Dyslipidemia 2015    Hypertension 2015    Hypothyroidism 2015    Memory deficit 2015           Past Medical History:   Diagnosis Date    Diabetes ([address]      Heart failure ([address]       unknown to family    Hypertension      Stroke [facility])              Core Measures:     CVA: No No  CHF:No No  PNA:No No        Activity Status:     OOB to Chair No  Ambulated this shift no   Bed Rest No             DVT prophylaxis:     DVT prophylaxis Med- Yes  DVT prophylaxis SCD or CONCHITA- No         Patient Safety:     Falls Score Total Score: 3  Safety Level_______  Bed Alarm On? Yes  Sitter?  No     Plan for upcoming shift: safety, antibiotics, monitor blood glucose,            Discharge Plan: Yes, home        Active Consults:  IP CONSULT TO HOSPITALIST  IP CONSULT TO INFECTIOUS DISEASES

## 2020-11-05 NOTE — PROGRESS NOTES
Received message from patient's nurse Rohan Rihcard stating :    I just wanted to make you aware that patient's blood glucose is 38. The patient does not have a hypoglycemic protocol ordered. Would you like to place additional orders? Discussion / orders:    Reviewed patient's medication record and noted that hypoglycemic protocol has already been ordered and has been in place since 11/1/2020. Send message back to patient's nurse and brought this to her attention. .           Please note that this note was dictated using Dragon computer voice recognition software. Quite often unanticipated grammatical, syntax, homophones, and other interpretive errors are inadvertently transcribed by the computer software. Please disregard these errors. Please excuse any errors that have escaped final proofreading.

## 2020-11-05 NOTE — PROGRESS NOTES
RAPID RESPONSE TEAM - follow up    Rounded on patient due to recent transfer from CCU. Discussed with primary RN. No acute concerns, VSS, MEWS 1. No RRT interventions indicated at this time. Please call with any questions or concerns.      Keisha Saravia

## 2020-11-05 NOTE — PROGRESS NOTES
Problem: Mobility Impaired (Adult and Pediatric)  Goal: *Acute Goals and Plan of Care (Insert Text)  Description: FUNCTIONAL STATUS PRIOR TO ADMISSION: Patient was independent and active without use of DME.    HOME SUPPORT PRIOR TO ADMISSION: The patient lived with sons. Physical Therapy Goals  Initiated 11/5/2020  1. Patient will move from supine to sit and sit to supine  in bed with independence within 7 day(s). 2.  Patient will transfer from bed to chair and chair to bed with independence using the least restrictive device within 7 day(s). 3.  Patient will perform sit to stand with independence within 7 day(s). 4.  Patient will ambulate with independence for 100 feet with the least restrictive device within 7 day(s). 5.  Patient will ascend/descend 10 stairs with 1 handrail(s) with supervision/set-up within 7 day(s). Outcome: Progressing Towards Goal  PHYSICAL THERAPY EVALUATION  Patient: Roxana Salas (19 y.o. female)  Date: 11/5/2020  Primary Diagnosis: DKA (diabetic ketoacidoses) ([facility] [E11.10]  Septic shock ([facility] [A41.9, R65.21]  Severe dehydration [E86.0]        Precautions:        ASSESSMENT  Based on the objective data described below, the patient presents with generalized weakness, decreased activity tolerance, impaired balance and altered gait. Pt was received in supine on RA and cleared by nursing to mobilize. She was able to come to the EOB, stood and ambulated in the room without AD. She was fatigued after little activity due to poor endurance. She was left sitting up in the chair. Encouraged her to be OOB for all meals. Current Level of Function Impacting Discharge (mobility/balance): CGA/SBA    Functional Outcome Measure: The patient scored 55/100 on the barthel outcome measure which is indicative of 45% impaired. Other factors to consider for discharge: confusion     Patient will benefit from skilled therapy intervention to address the above noted impairments. PLAN :  Recommendations and Planned Interventions: bed mobility training, transfer training, gait training, therapeutic exercises, patient and family training/education, and therapeutic activities      Frequency/Duration: Patient will be followed by physical therapy:  3 times a week to address goals. Recommendation for discharge: (in order for the patient to meet his/her long term goals)  Physical therapy at least 2 days/week in the home AND ensure assist and/or supervision for safety with mobility initally     This discharge recommendation:  Has not yet been discussed the attending provider and/or case management    IF patient discharges home will need the following DME: none         SUBJECTIVE:   Patient stated \" oh it feels so good to be up.     OBJECTIVE DATA SUMMARY:   HISTORY:    Past Medical History:   Diagnosis Date    Diabetes ([facility]     Heart failure ([facility]     unknown to family    Hypertension     Stroke [facility])      Past Surgical History:   Procedure Laterality Date    HX GYN      HX HEENT      thyroidectomy    HX HYSTERECTOMY      REMOVAL GALLBLADDER      THYROIDECTOMY         Personal factors and/or comorbidities impacting plan of care:     Home Situation  Home Environment: Private residence  One/Two Story Residence: Two story  Living Alone: No  Support Systems: Child(oumar)  Patient Expects to be Discharged to[de-identified] Private residence  Current DME Used/Available at Home: None  Tub or Shower Type: Shower    EXAMINATION/PRESENTATION/DECISION MAKING:   Critical Behavior:  Neurologic State: Alert, Confused  Orientation Level: Oriented to place, Oriented to person, Disoriented to situation, Disoriented to time  Cognition: Follows commands     Hearing:   Auditory  Auditory Impairment: None  Skin:  intact  Edema: none  Range Of Motion:  AROM: Within functional limits           PROM: Within functional limits           Strength:    Strength: Generally decreased, functional                    Tone & Sensation: Tone: Normal              Sensation: Intact               Coordination:  Coordination: Within functional limits  Vision:      Functional Mobility:  Bed Mobility:  Rolling: Supervision  Supine to Sit: Supervision     Scooting: Supervision  Transfers:  Sit to Stand: Stand-by assistance  Stand to Sit: Stand-by assistance                       Balance:   Sitting: Intact  Standing: Impaired  Standing - Static: Good  Standing - Dynamic : Fair  Ambulation/Gait Training:  Distance (ft): 60 Feet (ft)  Assistive Device: Gait belt  Ambulation - Level of Assistance: Contact guard assistance;Stand-by assistance        Gait Abnormalities: Decreased step clearance              Speed/Deepika: Slow  Step Length: Left shortened;Right shortened     Functional Measure:  Barthel Index:    Bathin  Bladder: 5  Bowels: 10  Groomin  Dressin  Feeding: 10  Mobility: 5  Stairs: 0  Toilet Use: 10  Transfer (Bed to Chair and Back): 10  Total: 55/100       The Barthel ADL Index: Guidelines  1. The index should be used as a record of what a patient does, not as a record of what a patient could do. 2. The main aim is to establish degree of independence from any help, physical or verbal, however minor and for whatever reason. 3. The need for supervision renders the patient not independent. 4. A patient's performance should be established using the best available evidence. Asking the patient, friends/relatives and nurses are the usual sources, but direct observation and common sense are also important. However direct testing is not needed. 5. Usually the patient's performance over the preceding 24-48 hours is important, but occasionally longer periods will be relevant. 6. Middle categories imply that the patient supplies over 50 per cent of the effort. 7. Use of aids to be independent is allowed. [facility] Barthel, D.W. (0931). Functional evaluation: the Barthel Index. [address] [facility] (14)2.   LINDA Sebastian, Doreen Mercedes., Jose Alejandro Hodges., Tess Smith (1999). Measuring the change indisability after inpatient rehabilitation; comparison of the responsiveness of the Barthel Index and Functional Louisa Measure. Journal of Neurology, Neurosurgery, and Psychiatry, 66(4), 349-698. GENTRY Aleman, BRENNA Mckay, & Vinod Hernandez M.A. (2004.) Assessment of post-stroke quality of life in cost-effectiveness studies: The usefulness of the Barthel Index and the EuroQoL-5D. Quality of Life Research, 15, 100-12            Physical Therapy Evaluation Charge Determination   History Examination Presentation Decision-Making   HIGH Complexity :3+ comorbidities / personal factors will impact the outcome/ POC  MEDIUM Complexity : 3 Standardized tests and measures addressing body structure, function, activity limitation and / or participation in recreation  MEDIUM Complexity : Evolving with changing characteristics  Other outcome measures barthel  MEDIUM      Based on the above components, the patient evaluation is determined to be of the following complexity level: MEDIUM    Pain Rating:  No complaints     Activity Tolerance:   Good    After treatment patient left in no apparent distress:   Sitting in chair and Call bell within reach    COMMUNICATION/EDUCATION:   The patients plan of care was discussed with: Registered nurse. Fall prevention education was provided and the patient/caregiver indicated understanding. and Patient/family agree to work toward stated goals and plan of care.     Thank you for this referral.  Brad Davila, PT, DPT   Time Calculation: 19 mins

## 2020-11-05 NOTE — PROGRESS NOTES
Notified by renaldo Benoit that patient's blood glucose is 38. Md notified. Four glucose tablets given. Will recheck BG in 15 minutes. 1262- repeat B. Will continue to monitor     0450- patient's BG is 161. Patient resting quietly in bed. Will continue to monitor.

## 2020-11-05 NOTE — PROGRESS NOTES
* No surgery found *  * No surgery found *  Bedside and Verbal shift change report given to [address] [place] [address] (oncoming nurse) by Pierre quiñonez RN (offgoing nurse). Report included the following information SBAR, Kardex and ED Summary.     Zone Phone:   [phone]        Significant changes during shift:  labs drawn  Patient Information     Al Tilley  67 y.o.  10/31/2020  8:30 PM by German Donald MD. Al Tilley was admitted from Home     Problem List           Patient Active Problem List     Diagnosis Date Noted    Vascular dementia without behavioral disturbance ([address] 10/20/2018       Priority: 3 - Three    Severe dehydration 10/31/2020    Septic shock ([address] 10/31/2020    Hyperkalemia 10/20/2020    DKA, type 1 ([address] 10/20/2020    Severe sepsis ([address] 10/20/2020    Candidiasis 10/20/2020    Dehydration 09/16/2020    Diabetic keto-acidosis ([address] 09/16/2020    Lactic acid acidosis 09/16/2020    ONEYDA (acute kidney injury) ([address] 09/16/2020    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 ([address] 08/31/2020    Hypoglycemia 06/21/2020    Hyperglycemia due to type 1 diabetes mellitus ([address] 02/27/2020    Venous insufficiency 01/16/2020    Anemia 12/04/2019    Acute metabolic encephalopathy 32/52/5547    H/O: CVA (cerebrovascular accident) 11/06/2018    DKA (diabetic ketoacidoses) ([address] 06/02/2017    Dyslipidemia 02/06/2015    Hypertension 02/06/2015    Hypothyroidism 02/06/2015    Memory deficit 02/06/2015           Past Medical History:   Diagnosis Date    Diabetes ([address]      Heart failure ([address]       unknown to family    Hypertension      Stroke ([address]              Core Measures:     CVA: No No  CHF:No No  PNA:No No        Activity Status:     OOB to Chair No  Ambulated this shift no   Bed Rest No             DVT prophylaxis:     DVT prophylaxis Med- Yes  DVT prophylaxis SCD or CONCHITA- No         Patient Safety:     Falls Score Total Score: 3  Safety Level_______  Bed Alarm On? Yes  Sitter?  No     Plan for upcoming shift: safety, antibiotics, monitor blood glucose,            Discharge Plan: Yes, home        Active Consults:  IP CONSULT TO HOSPITALIST  IP CONSULT TO INFECTIOUS DISEASES

## 2020-11-05 NOTE — PROGRESS NOTES
Problem: Diabetes Self-Management  Goal: *Disease process and treatment process  Description: Define diabetes and identify own type of diabetes; list 3 options for treating diabetes. Outcome: Progressing Towards Goal  Goal: *Incorporating nutritional management into lifestyle  Description: Describe effect of type, amount and timing of food on blood glucose; list 3 methods for planning meals. Outcome: Progressing Towards Goal  Goal: *Monitoring blood glucose, interpreting and using results  Description: Identify recommended blood glucose targets  and personal targets. Outcome: Progressing Towards Goal     Problem: Falls - Risk of  Goal: *Absence of Falls  Description: Document Heather Hardy Fall Risk and appropriate interventions in the flowsheet. Outcome: Progressing Towards Goal  Note: Fall Risk Interventions:  Mobility Interventions: Bed/chair exit alarm, Patient to call before getting OOB    Mentation Interventions: Adequate sleep, hydration, pain control, Bed/chair exit alarm, Door open when patient unattended, More frequent rounding, Reorient patient    Medication Interventions: Bed/chair exit alarm, Patient to call before getting OOB    Elimination Interventions: Bed/chair exit alarm, Call light in reach              Problem: Patient Education: Go to Patient Education Activity  Goal: Patient/Family Education  Outcome: Progressing Towards Goal     Problem: Pressure Injury - Risk of  Goal: *Prevention of pressure injury  Description: Document Madhu Scale and appropriate interventions in the flowsheet.   Outcome: Progressing Towards Goal  Note: Pressure Injury Interventions:  Sensory Interventions: Assess changes in LOC    Moisture Interventions: Absorbent underpads, Check for incontinence Q2 hours and as needed    Activity Interventions: Increase time out of bed    Mobility Interventions: Assess need for specialty bed    Nutrition Interventions: Document food/fluid/supplement intake    Friction and Shear Interventions: Minimize layers                Problem: Patient Education: Go to Patient Education Activity  Goal: Patient/Family Education  Outcome: Progressing Towards Goal

## 2020-11-05 NOTE — PROGRESS NOTES
Patient's blood glucose is 54. Orange juice given and rechecked B. Patient is asymptomatic and resting in bed. Will continue to monitor.

## 2020-11-06 LAB
ANION GAP SERPL CALC-SCNC: 6 MMOL/L (ref 5–15)
BACTERIA SPEC CULT: ABNORMAL
BASOPHILS # BLD: 0 K/UL (ref 0–0.1)
BASOPHILS NFR BLD: 0 % (ref 0–1)
BLASTS NFR BLD MANUAL: 0 %
BUN SERPL-MCNC: 10 MG/DL (ref 6–20)
BUN/CREAT SERPL: 20 (ref 12–20)
CALCIUM SERPL-MCNC: 7.8 MG/DL (ref 8.5–10.1)
CHLORIDE SERPL-SCNC: 110 MMOL/L (ref 97–108)
CO2 SERPL-SCNC: 25 MMOL/L (ref 21–32)
CREAT SERPL-MCNC: 0.51 MG/DL (ref 0.55–1.02)
DIFFERENTIAL METHOD BLD: ABNORMAL
EOSINOPHIL # BLD: 0.1 K/UL (ref 0–0.4)
EOSINOPHIL NFR BLD: 2 % (ref 0–7)
ERYTHROCYTE [DISTWIDTH] IN BLOOD BY AUTOMATED COUNT: 17.3 % (ref 11.5–14.5)
GLUCOSE BLD STRIP.AUTO-MCNC: 137 MG/DL (ref 65–100)
GLUCOSE BLD STRIP.AUTO-MCNC: 165 MG/DL (ref 65–100)
GLUCOSE BLD STRIP.AUTO-MCNC: 170 MG/DL (ref 65–100)
GLUCOSE BLD STRIP.AUTO-MCNC: 171 MG/DL (ref 65–100)
GLUCOSE BLD STRIP.AUTO-MCNC: 293 MG/DL (ref 65–100)
GLUCOSE BLD STRIP.AUTO-MCNC: 59 MG/DL (ref 65–100)
GLUCOSE SERPL-MCNC: 35 MG/DL (ref 65–100)
HCT VFR BLD AUTO: 26.5 % (ref 35–47)
HGB BLD-MCNC: 9 G/DL (ref 11.5–16)
IMM GRANULOCYTES # BLD AUTO: 0 K/UL
IMM GRANULOCYTES NFR BLD AUTO: 0 %
LYMPHOCYTES # BLD: 1.3 K/UL (ref 0.8–3.5)
LYMPHOCYTES NFR BLD: 35 % (ref 12–49)
MAGNESIUM SERPL-MCNC: 1.7 MG/DL (ref 1.6–2.4)
MCH RBC QN AUTO: 31.9 PG (ref 26–34)
MCHC RBC AUTO-ENTMCNC: 34 G/DL (ref 30–36.5)
MCV RBC AUTO: 94 FL (ref 80–99)
METAMYELOCYTES NFR BLD MANUAL: 2 %
MONOCYTES # BLD: 0.2 K/UL (ref 0–1)
MONOCYTES NFR BLD: 6 % (ref 5–13)
MYELOCYTES NFR BLD MANUAL: 0 %
NEUTS BAND NFR BLD MANUAL: 1 % (ref 0–6)
NEUTS SEG # BLD: 2 K/UL (ref 1.8–8)
NEUTS SEG NFR BLD: 54 % (ref 32–75)
NRBC # BLD: 0 K/UL (ref 0–0.01)
NRBC BLD-RTO: 0 PER 100 WBC
OTHER CELLS NFR BLD MANUAL: 0 %
PLATELET # BLD AUTO: 209 K/UL (ref 150–400)
PMV BLD AUTO: 9.4 FL (ref 8.9–12.9)
POTASSIUM SERPL-SCNC: 3.9 MMOL/L (ref 3.5–5.1)
POTASSIUM SERPL-SCNC: 4.1 MMOL/L (ref 3.5–5.1)
PROMYELOCYTES NFR BLD MANUAL: 0 %
RBC # BLD AUTO: 2.82 M/UL (ref 3.8–5.2)
RBC MORPH BLD: ABNORMAL
SERVICE CMNT-IMP: ABNORMAL
SODIUM SERPL-SCNC: 141 MMOL/L (ref 136–145)
WBC # BLD AUTO: 3.7 K/UL (ref 3.6–11)

## 2020-11-06 PROCEDURE — 74011250636 HC RX REV CODE- 250/636: Performed by: INTERNAL MEDICINE

## 2020-11-06 PROCEDURE — 99222 1ST HOSP IP/OBS MODERATE 55: CPT | Performed by: INTERNAL MEDICINE

## 2020-11-06 PROCEDURE — 82962 GLUCOSE BLOOD TEST: CPT

## 2020-11-06 PROCEDURE — L3710 EO ELAS W/METAL JNTS PRE OTS: HCPCS

## 2020-11-06 PROCEDURE — 83735 ASSAY OF MAGNESIUM: CPT

## 2020-11-06 PROCEDURE — 74011250636 HC RX REV CODE- 250/636: Performed by: NURSE PRACTITIONER

## 2020-11-06 PROCEDURE — 84132 ASSAY OF SERUM POTASSIUM: CPT

## 2020-11-06 PROCEDURE — 74011250637 HC RX REV CODE- 250/637: Performed by: INTERNAL MEDICINE

## 2020-11-06 PROCEDURE — 85027 COMPLETE CBC AUTOMATED: CPT

## 2020-11-06 PROCEDURE — 65660000000 HC RM CCU STEPDOWN

## 2020-11-06 PROCEDURE — 80048 BASIC METABOLIC PNL TOTAL CA: CPT

## 2020-11-06 PROCEDURE — 74011636637 HC RX REV CODE- 636/637: Performed by: INTERNAL MEDICINE

## 2020-11-06 PROCEDURE — 36415 COLL VENOUS BLD VENIPUNCTURE: CPT

## 2020-11-06 RX ORDER — PRAVASTATIN SODIUM 40 MG/1
80 TABLET ORAL
Status: DISCONTINUED | OUTPATIENT
Start: 2020-11-06 | End: 2020-11-06

## 2020-11-06 RX ORDER — AMLODIPINE BESYLATE 5 MG/1
5 TABLET ORAL DAILY
Status: DISCONTINUED | OUTPATIENT
Start: 2020-11-07 | End: 2020-11-10

## 2020-11-06 RX ORDER — MAGNESIUM SULFATE HEPTAHYDRATE 40 MG/ML
2 INJECTION, SOLUTION INTRAVENOUS ONCE
Status: COMPLETED | OUTPATIENT
Start: 2020-11-06 | End: 2020-11-06

## 2020-11-06 RX ORDER — INSULIN GLARGINE 100 [IU]/ML
8 INJECTION, SOLUTION SUBCUTANEOUS DAILY
Status: DISCONTINUED | OUTPATIENT
Start: 2020-11-06 | End: 2020-11-07

## 2020-11-06 RX ADMIN — INSULIN LISPRO 2 UNITS: 100 INJECTION, SOLUTION INTRAVENOUS; SUBCUTANEOUS at 09:25

## 2020-11-06 RX ADMIN — INSULIN GLARGINE 8 UNITS: 100 INJECTION, SOLUTION SUBCUTANEOUS at 09:25

## 2020-11-06 RX ADMIN — LEVOTHYROXINE SODIUM 175 MCG: 0.03 TABLET ORAL at 05:04

## 2020-11-06 RX ADMIN — Medication 10 ML: at 21:51

## 2020-11-06 RX ADMIN — ENOXAPARIN SODIUM 40 MG: 40 INJECTION SUBCUTANEOUS at 09:24

## 2020-11-06 RX ADMIN — Medication 1 CAPSULE: at 09:24

## 2020-11-06 RX ADMIN — ACETAMINOPHEN 650 MG: 325 TABLET ORAL at 06:53

## 2020-11-06 RX ADMIN — CLOPIDOGREL BISULFATE 75 MG: 75 TABLET ORAL at 09:24

## 2020-11-06 RX ADMIN — INSULIN LISPRO 2 UNITS: 100 INJECTION, SOLUTION INTRAVENOUS; SUBCUTANEOUS at 11:35

## 2020-11-06 RX ADMIN — VANCOMYCIN HYDROCHLORIDE 750 MG: 750 INJECTION, POWDER, LYOPHILIZED, FOR SOLUTION INTRAVENOUS at 17:24

## 2020-11-06 RX ADMIN — Medication 10 ML: at 05:05

## 2020-11-06 RX ADMIN — Medication 1 AMPULE: at 21:51

## 2020-11-06 RX ADMIN — Medication 10 ML: at 14:00

## 2020-11-06 RX ADMIN — Medication 16 G: at 05:03

## 2020-11-06 RX ADMIN — PRAVASTATIN SODIUM 80 MG: 40 TABLET ORAL at 09:24

## 2020-11-06 RX ADMIN — VANCOMYCIN HYDROCHLORIDE 750 MG: 750 INJECTION, POWDER, LYOPHILIZED, FOR SOLUTION INTRAVENOUS at 02:04

## 2020-11-06 RX ADMIN — MAGNESIUM SULFATE IN WATER 2 G: 40 INJECTION, SOLUTION INTRAVENOUS at 16:10

## 2020-11-06 NOTE — PROGRESS NOTES
Bedside and Verbal shift change report given to [facility] nurse) by VIN Pendleton (offgoing nurse).  Report included the following information SBAR, Kardex and ED Summary.     Zone Phone:   [phone]        Significant changes during shift:  8 beats of VTACH magnesium and potassium labs drawn, cardiology consult          Patient Information     Hanna Rico  67 y.o.  10/31/2020  8:30 PM by Ailyn Aburto MD. Deana Chase was admitted from Home     Problem List               Patient Active Problem List     Diagnosis Date Noted    Vascular dementia without behavioral disturbance ([address] 10/20/2018       Priority: 3 - Three    Severe dehydration 10/31/2020    Septic shock ([address] 10/31/2020    Hyperkalemia 10/20/2020    DKA, type 1 ([address] 10/20/2020    Severe sepsis ([address] 10/20/2020    Candidiasis 10/20/2020    Dehydration 09/16/2020    Diabetic keto-acidosis ([address] 09/16/2020    Lactic acid acidosis 09/16/2020    ONEYDA (acute kidney injury) ([address] 09/16/2020    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 ([address] 08/31/2020    Hypoglycemia 06/21/2020    Hyperglycemia due to type 1 diabetes mellitus ([address] 02/27/2020    Venous insufficiency 01/16/2020    Anemia 12/04/2019    Acute metabolic encephalopathy 97/44/4286    H/O: CVA (cerebrovascular accident) 11/06/2018    DKA (diabetic ketoacidoses) ([address] 06/02/2017    Dyslipidemia 02/06/2015    Hypertension 02/06/2015    Hypothyroidism 02/06/2015    Memory deficit 02/06/2015              Past Medical History:   Diagnosis Date    Diabetes ([address]      Heart failure ([address]       unknown to family    Hypertension      Stroke ([address]              Core Measures:     CVA: No No  CHF:No No  PNA:No No        Activity Status:     OOB to Chair No  Ambulated this shift no   Bed Rest No              DVT prophylaxis:     DVT prophylaxis Med- Yes  DVT prophylaxis SCD or CONCHITA- No         Patient Safety:     Falls Score Total Score: 3  Safety Level_______  Bed Alarm On? Yes  Sitter?  No     Plan for upcoming shift: safety, antibiotics, monitor blood glucose,            Discharge Plan: Yes, home        Active Consults:  IP CONSULT TO HOSPITALIST  IP CONSULT TO INFECTIOUS DISEASES

## 2020-11-06 NOTE — PROGRESS NOTES
Patient is having sequences of Vtach. Cardiac consulted. RN requested that we leave pt at rest and check on patient tomorrow. Will see as able & continue to follow for rehab.

## 2020-11-06 NOTE — PROGRESS NOTES
Problem: Diabetes Self-Management  Goal: *Disease process and treatment process  Description: Define diabetes and identify own type of diabetes; list 3 options for treating diabetes. Outcome: Progressing Towards Goal  Goal: *Incorporating nutritional management into lifestyle  Description: Describe effect of type, amount and timing of food on blood glucose; list 3 methods for planning meals. Outcome: Progressing Towards Goal  Goal: *Incorporating physical activity into lifestyle  Description: State effect of exercise on blood glucose levels. Outcome: Progressing Towards Goal  Goal: *Developing strategies to promote health/change behavior  Description: Define the ABC's of diabetes; identify appropriate screenings, schedule and personal plan for screenings. Outcome: Progressing Towards Goal  Goal: *Using medications safely  Description: State effect of diabetes medications on diabetes; name diabetes medication taking, action and side effects. Outcome: Progressing Towards Goal  Goal: *Monitoring blood glucose, interpreting and using results  Description: Identify recommended blood glucose targets  and personal targets. Outcome: Progressing Towards Goal  Goal: *Prevention, detection, treatment of acute complications  Description: List symptoms of hyper- and hypoglycemia; describe how to treat low blood sugar and actions for lowering  high blood glucose level. Outcome: Progressing Towards Goal  Goal: *Prevention, detection and treatment of chronic complications  Description: Define the natural course of diabetes and describe the relationship of blood glucose levels to long term complications of diabetes.   Outcome: Progressing Towards Goal  Goal: *Developing strategies to address psychosocial issues  Description: Describe feelings about living with diabetes; identify support needed and support network  Outcome: Progressing Towards Goal  Goal: *Insulin pump training  Outcome: Progressing Towards Goal  Goal: *Sick day guidelines  Outcome: Progressing Towards Goal  Goal: *Patient Specific Goal (EDIT GOAL, INSERT TEXT)  Outcome: Progressing Towards Goal     Problem: Patient Education: Go to Patient Education Activity  Goal: Patient/Family Education  Outcome: Progressing Towards Goal     Problem: Falls - Risk of  Goal: *Absence of Falls  Description: Document Joelle Astudillo Fall Risk and appropriate interventions in the flowsheet. Outcome: Progressing Towards Goal  Note: Fall Risk Interventions:  Mobility Interventions: Bed/chair exit alarm    Mentation Interventions: Adequate sleep, hydration, pain control, Bed/chair exit alarm    Medication Interventions: Bed/chair exit alarm, Patient to call before getting OOB    Elimination Interventions: Bed/chair exit alarm, Call light in reach              Problem: Patient Education: Go to Patient Education Activity  Goal: Patient/Family Education  Outcome: Progressing Towards Goal     Problem: Pressure Injury - Risk of  Goal: *Prevention of pressure injury  Description: Document Madhu Scale and appropriate interventions in the flowsheet.   Outcome: Progressing Towards Goal  Note: Pressure Injury Interventions:  Sensory Interventions: Assess changes in LOC    Moisture Interventions: Absorbent underpads, Apply protective barrier, creams and emollients    Activity Interventions: Increase time out of bed, Pressure redistribution bed/mattress(bed type)    Mobility Interventions: HOB 30 degrees or less    Nutrition Interventions: Document food/fluid/supplement intake, Offer support with meals,snacks and hydration    Friction and Shear Interventions: Minimize layers                Problem: Patient Education: Go to Patient Education Activity  Goal: Patient/Family Education  Outcome: Progressing Towards Goal     Problem: Patient Education: Go to Patient Education Activity  Goal: Patient/Family Education  Outcome: Progressing Towards Goal

## 2020-11-06 NOTE — PROGRESS NOTES
General Daily Progress Note    Admit Date: 10/31/2020    Subjective:     Patient has no complaint . Chano Doyle Current Facility-Administered Medications   Medication Dose Route Frequency    insulin glargine (LANTUS) injection 8 Units  8 Units SubCUTAneous DAILY    insulin lispro (HUMALOG) injection 2 Units  2 Units SubCUTAneous ACL    vancomycin (VANCOCIN) 750 mg in 0.9% sodium chloride (MBP/ADV) 250 mL  750 mg IntraVENous Q12H    insulin lispro (HUMALOG) injection 2 Units  2 Units SubCUTAneous ACB    lactobac ac& pc-s.therm-b.anim (GALILEA Q/RISAQUAD)  1 Cap Oral DAILY    alcohol 62% (NOZIN) nasal  1 Ampule  1 Ampule Topical Q12H    glucose chewable tablet 16 g  4 Tab Oral PRN    dextrose (D50W) injection syrg 12.5-25 g  12.5-25 g IntraVENous PRN    glucagon (GLUCAGEN) injection 1 mg  1 mg IntraMUSCular PRN    clopidogreL (PLAVIX) tablet 75 mg  75 mg Oral DAILY    levothyroxine (SYNTHROID) tablet 175 mcg  175 mcg Oral 6am    pravastatin (PRAVACHOL) tablet 80 mg  80 mg Oral DAILY    sodium chloride (NS) flush 5-40 mL  5-40 mL IntraVENous Q8H    sodium chloride (NS) flush 5-40 mL  5-40 mL IntraVENous PRN    acetaminophen (TYLENOL) tablet 650 mg  650 mg Oral Q6H PRN    Or    acetaminophen (TYLENOL) suppository 650 mg  650 mg Rectal Q6H PRN    polyethylene glycol (MIRALAX) packet 17 g  17 g Oral DAILY PRN    promethazine (PHENERGAN) tablet 12.5 mg  12.5 mg Oral Q6H PRN    Or    ondansetron (ZOFRAN) injection 4 mg  4 mg IntraVENous Q6H PRN    enoxaparin (LOVENOX) injection 40 mg  40 mg SubCUTAneous DAILY        Review of Systems  A comprehensive review of systems was negative.     Objective:     Patient Vitals for the past 24 hrs:   BP Temp Pulse Resp SpO2   11/06/20 0732 (!) 144/52 98.3 °F (36.8 °C) 79 16 100 %   11/06/20 0351 (!) 145/55 97.9 °F (36.6 °C) 78 17 100 %   11/05/20 2323 117/78 97.9 °F (36.6 °C) 70 16 99 %   11/05/20 1957 (!) 141/56 98.2 °F (36.8 °C) 68 17 100 %   11/05/20 1455 (!) 134/57 97.2 °F (36.2 °C) 79 16 100 %   11/05/20 1106 (!) 120/57 98.3 °F (36.8 °C) 79 16 100 %     No intake/output data recorded. 11/04 1901 - 11/06 0700  In: -   Out: 600 [Urine:600]    Physical Exam:   Visit Vitals  BP (!) 144/52 (BP 1 Location: Right arm, BP Patient Position: At rest)   Pulse 79   Temp 98.3 °F (36.8 °C)   Resp 16   Ht 5' 3\" (1.6 m)   Wt 133 lb (60.3 kg)   SpO2 100%   BMI 23.56 kg/m²     General appearance: alert, cooperative, no distress, appears stated age  Neck: supple, symmetrical, trachea midline, no adenopathy, thyroid: not enlarged, symmetric, no tenderness/mass/nodules, no carotid bruit and no JVD  Lungs: clear to auscultation bilaterally  Heart: regular rate and rhythm, S1, S2 normal, no murmur, click, rub or gallop  Abdomen: soft, non-tender. Bowel sounds normal. No masses,  no organomegaly  Extremities: edema 1+distally    Assessment:     Active Problems:    DKA [facility]diabetic k[facility]acidoses) ([facility] (6/2/2017)      Severe dehydration (10/31/2020)      Septic shock ([facility] (10/31/2020)        Plan:     1. Continue parenteral antibiotics up until and including Sunday. 2.  Continue diabetic control. 3.  Plan discharge on Monday morning if all goes well.

## 2020-11-06 NOTE — PROGRESS NOTES
Comprehensive Nutrition Assessment    Type and Reason for Visit: Initial, RD nutrition re-screen/LOS    Nutrition Recommendations/Plan:   Continue Consistent carb diet     Nutrition Assessment:     Patient medically noted for DKA and bacteremia. PMH HTN, vascular dementia, and CVA. Chart reviewed for length of stay. Patient having lunch at time of visit. She notes a good appetite and eating well. She had eaten >50% of lunch and was still working. Fluctuation in BG noted. Continue CCD. Encourage intake of meals. Estimated Daily Nutrient Needs:  Energy (kcal): 1364 kcal (BMR 1059 x 1. 3AF); Weight Used for Energy Requirements: Current  Protein (g): 72g (1.2 g/kg bw); Weight Used for Protein Requirements: Current  Fluid (ml/day): 1350 mL; Method Used for Fluid Requirements: 1 ml/kcal    Nutrition Related Findings:       [id number]  Medications: Plavix, Lantus, Humalog, Jennyfer Q, Synthroid, Pravastatin     Wounds:    None       Current Nutrition Therapies:  DIET DIABETIC CONSISTENT CARB Regular    Anthropometric Measures:  · Height:  5' 3\" (160 cm)  · Current Body Wt:  60.3 kg (132 lb 15 oz)    · BMI Category:  Normal weight (BMI 18.5-24. 9)       Nutrition Diagnosis:   No nutrition diagnosis at this time     Nutrition Interventions:   Food and/or Nutrient Delivery: Continue current diet  Nutrition Education and Counseling: No recommendations at this time  Coordination of Nutrition Care: No recommendation at this time    Goals:  PO intake >70% of meals/snacks next 5-7 days       Nutrition Monitoring and Evaluation:   Behavioral-Environmental Outcomes: None identified  Food/Nutrient Intake Outcomes: Food and nutrient intake  Physical Signs/Symptoms Outcomes: Biochemical data, Weight    Discharge Planning:    Continue current diet     Electronically signed by Veronica Gar RD on 11/6/2020 at 12:13 PM    Contact: ext [phone]

## 2020-11-06 NOTE — PROGRESS NOTES
Infectious Disease Progress          IMPRESSION:   · Coagulase-negative staph bacteremia (Ox R) , blood culture+ 10/31-2/4 different colony type in 2nd of 4 bottles ?contaminant. In view of initial clinical presentation, also multiple recent hospitalizations, we will continue vancomycin IV for total of 7 days. · Repeat blood cultures11/2- NG.  · TTE negative for vegetation  · Shock septic vs hypovolemic, s/p fluids and pressors  · DKA resolved  · S/p ONEYDA   · Multiple admissions for poorly controlled blood sugars/DKA  · Dementia  · History of CVA  · History of thyroidectomy on Synthroid       PLAN:      ·  Vancomycin 750 mg IV every 12 x 7 days end date 11/8. Pharmacy dosing per protocol, target trough 15-20. Monitor creatinine. · Patient encouraged to drink more fluids  · Blood sugar control. Patient seen earlier today. No complaints except\" when can  go home\". No fever, chills. No skin breakdown. The patient is a 67years old female  with past medical history of diabetes mellitus, hypertension, CVA, dementia who presented to emergency department due to elevated blood sugar around 1200. Per Ed note,on  arrival to the ER, patient was found to be in DKA & was started on DKA protocol. Her blood pressure had  in the ED for which she was given IV fluid bolus 30 cc/kg with no response, then she was started on IV vasopressors . Triple-lumen catheter in the right IJ was placed by the ED physician. As per history her daughter Corbin Wiley patient had thought pt forgot to take her insulin and her blood sugar had been  elevated for today prior to the hospital.  Patient had blood cultures drawn on 10/31, now positive for gram-positive cocci 2/4. Urine cultureNG <1000, UA -unremarkable except yeast, few epithelial cells, WBC 10-20. Patient seen today in ICU. She is off pressors.   Awake and oriented x3  Of note patient has had multiple admissions recently for unstable blood sugars, DKA last admission 10/20-10/25, before that 9/16-9/21, 9/01-9/04  Patient Active Problem List   Diagnosis Code    Dyslipidemia E78.5    Hypertension I10    Hypothyroidism E03.9    Memory deficit R41.3    DKA (diabetic ketoacidoses) ([facility] E11.10    Vascular dementia without behavioral disturbance (HCC) F01.50    H/O: CVA (cerebrovascular accident) Z80.78    Acute metabolic encephalopathy W83.77    Anemia D64.9    Venous insufficiency I87.2    Hyperglycemia due to type 1 diabetes mellitus ([facility] E10.65    Hypoglycemia E16.2    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 (HCC) E10.649    Dehydration E86.0    Diabetic keto-acidosis ([facility]) E11.10    Lactic acid acidosis E87.2    ONEYDA (acute kidney [facility]) [facility] N17.9    Hyperkalemia E87.5    DKA, type 1 ([facility]) E10.10    Severe sepsis ([facility]) A41.9, R65.20    Candidiasis B37.9    Severe dehydration E86.0    Septic shock ([facility]) A41.9, R65.21     Past Medical History:   Diagnosis Date    Diabetes ([facility]     Heart failure ([facility]     unknown to family    Hypertension     Stroke ([facility]       Family History   Problem Relation Age of Onset    Diabetes Father     No Known Problems Mother       Social History     Tobacco Use    Smoking status: Never Smoker    Smokeless tobacco: Never Used   Substance Use Topics    Alcohol use: No     Comment: been years     Past Surgical History:   Procedure Laterality Date    HX GYN      HX HEENT      thyroidectomy    HX HYSTERECTOMY      REMOVAL GALLBLADDER      THYROIDECTOMY        Prior to Admission medications    Medication Sig Start Date End Date Taking? Authorizing Provider   insulin degludec Mari Jack FlexTouch U-100) 100 unit/mL (3 mL) inpn 12 Units by SubCUTAneous route daily. 10/25/20  Yes Kartik Oh MD   insulin lispro (HUMALOG) 100 unit/mL kwikpen 2 units before breakfast and lunch only 10/25/20  Yes Kartik Oh MD   amLODIPine (NORVASC) 10 mg tablet Take 1 Tab by mouth daily.  10/5/20  Yes Kartik Oh MD losartan-hydroCHLOROthiazide (HYZAAR) 100-25 mg per tablet Take 1 Tab by mouth daily. Yes Provider, Historical   metoprolol succinate (TOPROL-XL) 25 mg XL tablet TAKE 1 TABLET BY MOUTH EVERY DAY 20  Yes Ray Tang MD   pravastatin (PRAVACHOL) 80 mg tablet TAKE 1 TABLET BY MOUTH at bedtime 20  Yes Ray Tang MD   levothyroxine (SYNTHROID) 175 mcg tablet TAKE 1 TABLET BY MOUTH EVERY DAY 20  Yes Ray Tang MD   clopidogreL (PLAVIX) 75 mg tab TAKE 1 TABLET BY MOUTH EVERY DAY 20  Yes Ray Tang MD   brimonidine (ALPHAGAN) 0.15 % ophthalmic solution Administer 1 Drop to both eyes two (2) times a day. 1/30/15  Yes Provider, Historical   zinc oxide 20 % ointment Apply 1 Applicator to affected area two (2) times a day. thin layer gluteal cleft    Provider, Historical     No Known Allergies     Review of Systems:  A comprehensive review of systems was negative except for that written in the History of Present Illness. 10 point review of systems obtained . All other systems negative    Objective:   Blood pressure (!) 141/56, pulse 68, temperature 98.2 °F (36.8 °C), resp. rate 17, height 5' 3\" (1.6 m), weight 133 lb (60.3 kg), SpO2 100 %.   Temp (24hrs), Av °F (36.7 °C), Min:97.2 °F (36.2 °C), Max:98.4 °F (36.9 °C)    Current Facility-Administered Medications   Medication Dose Route Frequency    insulin glargine (LANTUS) injection 12 Units  12 Units SubCUTAneous DAILY    insulin lispro (HUMALOG) injection 2 Units  2 Units SubCUTAneous ACL    vancomycin (VANCOCIN) 750 mg in 0.9% sodium chloride (MBP/ADV) 250 mL  750 mg IntraVENous Q12H    insulin lispro (HUMALOG) injection 2 Units  2 Units SubCUTAneous ACB    lactobac ac& pc-s.therm-b.anim (GALILEA Q/RISAQUAD)  1 Cap Oral DAILY    alcohol 62% (NOZIN) nasal  1 Ampule  1 Ampule Topical Q12H    glucose chewable tablet 16 g  4 Tab Oral PRN    dextrose (D50W) injection syrg 12.5-25 g  12.5-25 g IntraVENous PRN    glucagon (GLUCAGEN) injection 1 mg  1 mg IntraMUSCular PRN    clopidogreL (PLAVIX) tablet 75 mg  75 mg Oral DAILY    levothyroxine (SYNTHROID) tablet 175 mcg  175 mcg Oral 6am    pravastatin (PRAVACHOL) tablet 80 mg  80 mg Oral DAILY    sodium chloride (NS) flush 5-40 mL  5-40 mL IntraVENous Q8H    sodium chloride (NS) flush 5-40 mL  5-40 mL IntraVENous PRN    acetaminophen (TYLENOL) tablet 650 mg  650 mg Oral Q6H PRN    Or    acetaminophen (TYLENOL) suppository 650 mg  650 mg Rectal Q6H PRN    polyethylene glycol (MIRALAX) packet 17 g  17 g Oral DAILY PRN    promethazine (PHENERGAN) tablet 12.5 mg  12.5 mg Oral Q6H PRN    Or    ondansetron (ZOFRAN) injection 4 mg  4 mg IntraVENous Q6H PRN    enoxaparin (LOVENOX) injection 40 mg  40 mg SubCUTAneous DAILY        Exam:    General:  Awake, cooperative,   Eyes:  Sclera anicteric. Pupils equally round and reactive to light. Mouth/Throat: Mucous membranes normal, oral pharynx clear   Neck: Supple   Lungs:   reduced auscultation bases   CV:  Regular rate and rhythm,no murmur, click, rub or gallop   Abdomen:   Soft, non-tender.  bowel sounds normal. non-distended   Extremities: No  edema   Skin: Skin color, texture, turgor normal. no skin breakdown   Lymph nodes: Cervical and supraclavicular normal   Musculoskeletal: No swelling or deformity   Lines/Devices:  Intact, no erythema, drainage or tenderness   Psych: Awake and oriented, normal mood affect       Data Reviewed:     Lab Results   Component Value Date/Time    Culture result: NO GROWTH 3 DAYS 11/02/2020 10:47 AM    Culture result: No growth (<1,000 CFU/ML) 10/31/2020 11:16 PM    Culture result: (A) 10/31/2020 08:10 PM     STAPHYLOCOCCUS EPIDERMIDIS (OXACILLIN RESISTANT) GROWING IN 1 OF 4 BOTTLES DRAWN (NO SITE INDICATED)    Culture result: (A) 10/31/2020 08:10 PM     STAPHYLOCOCCUS EPIDERMIDIS (OXACILLIN RESISTANT) (2ND COLONY TYPE/STRAIN) GROWING IN THE 2ND OF 4 BOTTLES DRAWN (NO SITE INDICATED) Culture result: REMAINING BOTTLE(S) HAS/HAVE NO GROWTH SO FAR 10/31/2020 08:10 PM          XR Results (most recent):  Results from Hospital Encounter encounter on 10/31/20   XR CHEST PORT    Narrative EXAM: XR CHEST PORT    INDICATION: Left pleural effusion    COMPARISON: 10/31/2020    FINDINGS: A portable AP radiograph of the chest was obtained at 0851 hours. The  patient is on a cardiac monitor. The right IJ line has been removed. A complex  linear airspace process is present at the right lung base. A small left pleural  effusion persists. Inferior to the right scapula is a subcentimeter  calcification that may be soft tissue or vascular in nature. Impression IMPRESSION:     Stable small left pleural effusion  With probable right basilar subsegmental atelectasis but early infiltrate cannot  be excluded               ICD-10-CM ICD-9-CM    1. Diabetic ketoacidosis with coma associated with type 1 diabetes mellitus ([facility])  E10.11 250.33    2. Acute kidney injury ([facility]  N17.9 584.9    3. Hypotension, unspecified hypotension type  I95.9 458.9    4. Metabolic acidosis  X49.0 265.6    5. Diabetic ketoacidosis without coma associated with type 2 diabetes mellitus ([facility])  E11.10 250.12    6. Gram-positive cocci bacteremia  R78.81 790.7      041.89    7. Sepsis with acute renal failure and renal cortical necrosis, due to unspecified organism, unspecified whether septic shock present ([facility]  A41.9 038.9     R65.20 995.91     N17.1 584.6    8. Diabetic ketoacidosis without coma associated with type 1 diabetes mellitus ([facility])  E10.10 250.13    9. ONEYDA (acute kidney injury) ([facility]  N17.9 584.9    10. Acute metabolic encephalopathy  G81.20 348.31    11. Dehydration  E86.0 276.51    12. Coagulase negative Staphylococcus bacteremia  R78.81 790.7     B95.7 041.19    13. Hypothyroidism due to medication  E03.2 244.8      E980.5    14. Septic shock (HCC)  A41.9 038.9     R65.21 785.52      995.92    15.  Severe dehydration  E86.0 276.51            Antibiotic History  Vancomycin ,s/p  Zosyn IV  I have discussed the diagnosis with the patient and the intended plan as seen in the above orders. I have discussed medication side effects and warnings with the patient as well.     Reviewed test results at length with patient    Signed By: Juan Pablo Figueroa MD FACP    November 5, 2020

## 2020-11-06 NOTE — CONSULTS
[address] [facility]     Date of  Admission: 10/31/2020  8:30 PM     Admission type:Emergency    Consult for: [address] by: Dr. Haydee Robles     Subjective:     Loyd Lesches is a 67 y.o. female with a PMH significant for DM II, HTN, CVA, Dementia admitted for DKA (diabetic ketoacidoses) ([address] [E11.10]  Septic shock ([address] [A41.9, R65.21]  Severe dehydration [E86.0]. Per attending note, the patient presented to ED with BG 1200, started on DKA protocol. Upon assessment, Ms. Mario Rojas is pleasantly confused. She states she lives with her son, but no family present to obtain thorough history, most obtained through chart review. She thought it was June 2012, oriented to self and place however. She denies any palpitations, leg edema, shortness of breath, chest pain, syncope, near syncope, or any prior heart issues. She also denies any recent nausea, fever, chills, or exposure to ill persons. Last seen by cards in 2019, had negative nuclear stress and ECHO performed showing EF 56-60%, mild AS, trace MR, mild to moderate TR.      Cardiac risk factors: diabetes mellitus, sedentary life style, hypertension, post-menopausal.      Patient Active Problem List    Diagnosis Date Noted    Vascular dementia without behavioral disturbance ([address] 10/20/2018     Priority: 3 - Three    Severe dehydration 10/31/2020    Septic shock ([address] 10/31/2020    Hyperkalemia 10/20/2020    DKA, type 1 ([address] 10/20/2020    Severe sepsis ([address] 10/20/2020    Candidiasis 10/20/2020    Dehydration 09/16/2020    Diabetic keto-acidosis ([address] 09/16/2020    Lactic acid acidosis 09/16/2020    ONEYDA (acute kidney injury) ([address] 09/16/2020    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 ([address] 08/31/2020    Hypoglycemia 06/21/2020    Hyperglycemia due to type 1 diabetes mellitus ([address] 02/27/2020    Venous insufficiency 01/16/2020    Anemia 12/04/2019    Acute metabolic encephalopathy 39/02/0030    H/O: CVA (cerebrovascular accident) 11/06/2018    DKA (diabetic ketoacidoses) ([facility] 06/02/2017    Dyslipidemia 02/06/2015    Hypertension 02/06/2015    Hypothyroidism 02/06/2015    Memory deficit 02/06/2015      Edwin De La Torre MD  Past Medical History:   Diagnosis Date    Diabetes ([facility]     Heart failure ([facility]     unknown to family    Hypertension     Stroke [facility])       Social History     Socioeconomic History    Marital status:      Spouse name: Not on file    Number of children: 1    Years of education: Not on file    Highest education level: Not on file   Occupational History    Occupation: retired   Tobacco Use    Smoking status: Never Smoker    Smokeless tobacco: Never Used   Substance and Sexual Activity    Alcohol use: No     Comment: been years    Drug use: No    Sexual activity: Not Currently     No Known Allergies   Family History   Problem Relation Age of Onset    Diabetes Father     No Known Problems Mother       Current Facility-Administered Medications   Medication Dose Route Frequency    insulin glargine (LANTUS) injection 8 Units  8 Units SubCUTAneous DAILY    [START ON 11/7/2020] Vancomycin Trough  1 Each Other ONCE    insulin lispro (HUMALOG) injection 2 Units  2 Units SubCUTAneous ACL    vancomycin (VANCOCIN) 750 mg in 0.9% sodium chloride (MBP/ADV) 250 mL  750 mg IntraVENous Q12H    insulin lispro (HUMALOG) injection 2 Units  2 Units SubCUTAneous ACB    lactobac ac& pc-s.therm-b.anim (GALILEA Q/RISAQUAD)  1 Cap Oral DAILY    alcohol 62% (NOZIN) nasal  1 Ampule  1 Ampule Topical Q12H    glucose chewable tablet 16 g  4 Tab Oral PRN    dextrose (D50W) injection syrg 12.5-25 g  12.5-25 g IntraVENous PRN    glucagon (GLUCAGEN) injection 1 mg  1 mg IntraMUSCular PRN    clopidogreL (PLAVIX) tablet 75 mg  75 mg Oral DAILY    levothyroxine (SYNTHROID) tablet 175 mcg  175 mcg Oral 6am    pravastatin (PRAVACHOL) tablet 80 mg  80 mg Oral DAILY    sodium chloride (NS) flush 5-40 mL  5-40 mL IntraVENous Q8H    sodium chloride (NS) flush 5-40 mL  5-40 mL IntraVENous PRN    acetaminophen (TYLENOL) tablet 650 mg  650 mg Oral Q6H PRN    Or    acetaminophen (TYLENOL) suppository 650 mg  650 mg Rectal Q6H PRN    polyethylene glycol (MIRALAX) packet 17 g  17 g Oral DAILY PRN    promethazine (PHENERGAN) tablet 12.5 mg  12.5 mg Oral Q6H PRN    Or    ondansetron (ZOFRAN) injection 4 mg  4 mg IntraVENous Q6H PRN    enoxaparin (LOVENOX) injection 40 mg  40 mg SubCUTAneous DAILY        Review of Symptoms:   11 systems reviewed, negative other than as stated in the HPI        Objective:      Visit Vitals  BP (!) 158/72   Pulse 77   Temp 98.4 °F (36.9 °C)   Resp 12   Ht 5' 3\" (1.6 m)   Wt 60.3 kg (133 lb)   SpO2 100%   BMI 23.56 kg/m²       Physical:   General: pleasant, elderly AA female in NAD   Heart: RRR, no m/S3/JVD, no carotid bruits   Lungs: clear throughout   Abdomen: Soft, +BS, NTND   Extremities: LE peter +DP/PT, 1+ pitting bilateral ankle edema   Neurologic: Alert, oriented to person and place not situation and time. Data Review:   Recent Labs     11/06/20 0320 11/05/20 0324 11/04/20 0420   WBC 3.7 3.6 4.1   HGB 9.0* 9.6* 10.3*   HCT 26.5* 28.3* 31.7*    186 231     Recent Labs     11/06/20 0320 11/05/20 0324 11/04/20  0420    136 138   K 3.9 4.9 4.5   * 108 109*   CO2 25 23 23   GLU 35* 38* 208*   BUN 10 10 9   CREA 0.51* 0.46* 0.76   CA 7.8* 7.0* 7.4*       No results for input(s): TROIQ, CPK, CKMB in the last 72 hours.     No intake or output data in the 24 hours ending 11/06/20 1339     Cardiographics    Telemetry: SR 80's, did have 8 beat, 5 beat, 2 beats run of non-sustained VT  ECG: NSR with SA  Echocardiogram:  EF 60-65%, left pleural effusion, mild hypertrophy   CXRAY: \"Stable small left pleural effusion  With probable right basilar subsegmental atelectasis but early infiltrate cannot be excluded\"       Assessment:       Active Problems: DKA (diabetic ketoacidoses) ([facility] (6/2/2017)      Severe dehydration (10/31/2020)      Septic shock ([facility] (10/31/2020)         Plan:   Pura Reyes is a 67 y.o. female with a PMH significant for DM II, HTN, CVA, Dementia admitted for DKA (diabetic ketoacidoses) ([facility] [E11.10] Septic shock ([facility] [A41.9, R65.21] Severe dehydration [E86.0]. Cardiology consulted for self limiting, non-sustained Vtach. Plt 209, EKG and ECHO reviewed, K 3.9 (new Mg and K pending), H/H 9/26.5, Cr 0.51. Trop (-), NT-pro . Vtach: 8 beats, non-sustained  -Cardiac w/u 2019, nuclear stress negative, repeat ECHO WNL  -Check K and Mg. If K less than 4, replete, Mg less than 2 replete.   -Continue to monitor tele.      History of CVA  History of dementia  Continue Plavix  -resume statin      Hypertension  BP up, restart home medications with hold parameters.   -Norvasc 5 mg PO daily      History of thyroidectomy  Chronically on Synthroid, continue  -Check TSH     Thank you for consulting RCA. Jerrell Clay, YANY   MSN,RN,AG-ACNP-BC      [address] [facility]    11/6/2020         Patient seen, examined by me personally. Plan discussed as detailed. Agree with note as outlined by  NP. I confirm findings in history and physical exam. No additional findings noted. Agree with plan as outlined above. Asymptomatic ECT, appears to be NSVT. Normal stress test, echo within last year. Check labs. Consider low dose betablocker if recurrent episodes. Will d/c zofran, ? Contributing.     Sorin Pierre MD

## 2020-11-06 NOTE — PROGRESS NOTES
Bedside and Verbal shift change report given to Uri Waite RN (oncoming nurse) by VIN Medellin (offgoing nurse).  Report included the following information SBAR, Kardex and ED Summary.     Zone Phone:   [phone]        Significant changes during shift:  Blood glucose 35, repeat of blood sugar was 59, Glucose tabs x 4 given and repeat bs was 137, repeat again bs is 171          Patient Information     George William  67 y.o.  10/31/2020  8:30 PM by Nica Conn MD. Deana Chase was admitted from Home     Problem List               Patient Active Problem List     Diagnosis Date Noted    Vascular dementia without behavioral disturbance ([address] 10/20/2018       Priority: 3 - Three    Severe dehydration 10/31/2020    Septic shock ([address] 10/31/2020    Hyperkalemia 10/20/2020    DKA, type 1 ([address] 10/20/2020    Severe sepsis ([address] 10/20/2020    Candidiasis 10/20/2020    Dehydration 09/16/2020    Diabetic keto-acidosis ([address] 09/16/2020    Lactic acid acidosis 09/16/2020    ONEYDA (acute kidney injury) ([address] 09/16/2020    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 ([address] 08/31/2020    Hypoglycemia 06/21/2020    Hyperglycemia due to type 1 diabetes mellitus ([address] 02/27/2020    Venous insufficiency 01/16/2020    Anemia 12/04/2019    Acute metabolic encephalopathy 93/27/0932    H/O: CVA (cerebrovascular accident) 11/06/2018    DKA (diabetic ketoacidoses) ([address] 06/02/2017    Dyslipidemia 02/06/2015    Hypertension 02/06/2015    Hypothyroidism 02/06/2015    Memory deficit 02/06/2015              Past Medical History:   Diagnosis Date    Diabetes ([address]      Heart failure ([address]       unknown to family    Hypertension      Stroke ([address]              Core Measures:     CVA: No No  CHF:No No  PNA:No No        Activity Status:     OOB to Chair No  Ambulated this shift no   Bed Rest No              DVT prophylaxis:     DVT prophylaxis Med- Yes  DVT prophylaxis SCD or CONCHITA- No         Patient Safety:     Falls Score Total Score: 3  Safety Level_______  Bed Alarm On? Yes  Sitter?  No     Plan for upcoming shift: safety, antibiotics, monitor blood glucose,            Discharge Plan: Yes, home        Active Consults:  IP CONSULT TO HOSPITALIST  IP CONSULT TO INFECTIOUS DISEASES

## 2020-11-06 NOTE — PROGRESS NOTES
Goals Addressed                 This Visit's Progress     Supportive resources: medicaid assistance and options for caregiver support          11/6/2020   SW contacted [address] [phone] to inquire about their availability to provide personal care services through [facility]. SW spoke with Balbir Avendano and she is willing to accept patient's case for personal care. She requested a copy of pt's Uniform Assessment Instrument screening to initiate process. SW contacted pt's daughter, Chelsea Blank to update her on the information that is requested for services. SW provided her with owner's name, fax number and e-mail address to receive UAI screening. Fax number: [phone], e-mail address: [email or web address] SW will follow up with daughter at a later date for provide further assistance if needed,  Yvette Garrison, [facility] Team   [address] [place] [address] Ambulatory Care Coordination Team  [phone]       11/4/2020   SW contacted [facility] to inquire about status of personal care services. Rep reported that they have not found a care aide that is able to take the case at this time. SW will make another attempt to contact pt's Medicaid Care Coordinator for assistance. 10/29/2020   Follow up   SW contacted daughter to inquire about status of patient's LTC Medicaid coverage. Daughter stated that patient was approved for LTC Medicaid and she was given a copy of Uniform Assessment Instrument for personal care. Daughter reported that she is having difficulty with identifying a personal care agency that can provide services for pt due to various reasons. Daughter provided SW with a list of agencies that she has contacted. Patient was approved for 40hrs a week for personal care. Plan  SW contacted [facility] [phone] and they are willing to accept the case pending the availability for aides that could render the service in her area. Home Health representative will contact daughter for additional follow up. YOLANDA also contacted [facility] to speak with patient's Complex Care Coordinator. SW left a voicemail with CC Luis Alberto Garcia [phone] requesting a call back for assistance with care coordination. SW shared this update with patient's daughter and advised her to follow up with CC next week. SW will follow up at a later date. Xu Starks [address] [place] [address] Team   [facility] Ambulatory Care Coordination Team  [phone]         10/14/2020   Follow up  SW received a referral from [facility] requesting a follow up call with patient's daughter to assist her with signing patient up for Meals on Wheels program.  CTN contacted [facility] enrolling patient into their home delivered meals program (10 meals) following her recent discharge from hospital.  SW contacted daughter Jc Wills for follow up. Plan  SW completed the Meals on Wheels application online with daughter's assistance. Advise daughter to contact [employer] after 2 weeks if she has not received approval. Per Jc Wills, she is expecting to receive notification from [facility] [place] [address] regarding pt's status for LTC/personal care Medicaid coverage. SW explained to daughter the process for selecting  care agencies and advise her to follow up for assistance if needed. Daughter understood and voiced no additional questions or concerns during the call. Maggi Fay [facility] Team   [address] [place] [address] Coordination Team  [phone]     7/6/20  SW received referral from [facility] requesting a follow up call with patient's daughter to offer assistance/support with establishing personal care and assistance for patient. Per CTN, a UAI screening was completed while pt was hospitalized and daughter was working with [facility] program to establish day program/Medicaid. SW left voice mails requesting a call back. YOLANDA will follow up once call is returned  Alvarez Pagan, [facility] Ambulatory Care Coordination Team  [phone]      2/26/2020   CT SW received a call from daughter discussing pt's plan. Daughter stated that she is having difficulty with getting in contact with [facility] to initiate UAI screening for personal care. SW reviewed number that was provided and encouraged her to make another outreach attempt. SW also provided her with [facility] contact information and explained the purpose/benefits for CM services through insurance provider. SW emailed her a blank ABIEL form to complete and fax to [facility] giving her permission to communicate on pt's behalf. She reported that pt is doing well and she is planning to assist her to PCP f/u appointment tomorrow. SW will follow up with daughter at a later date. [place] [address] Ambulatory Care Coordination Team  [phone]    2/25/20  CT YOLANDA left a message with patient's daughter requesting a call back. YOLANDA will provide daughter with pt's [facility]  name and number and discuss the process of submitting an authorization release to [facility] on behalf of the patient for caregiver purposes. 2/21/20  CT YOLANDA contacted patient's daughter and advised her to contact [facility] to initiate Uniform Assessment Instrument screening for personal care/LTC assistance. Daughter reported that she submitted a medicaid application. Daughter in agreement with receiving assistance from PharmD for Diabetes education/tx options for patient. YOLANDA will update Candido ByrneD of this request.  Neftali Ann will follow up at a later date to inquire about progress and offer assistance if needed.      [place] [address] Ambulatory Care Coordination Team  [phone]    2/20/20  CT YOLANDA was notified of patient's admission to [facility]. SW left a message with pt's  [address] requesting a uniform assessment screening for personal care/LTC services. SW will attempt contact at a later date if call is not returned. Daughter was also made aware of the UAI request.      2/18/20  YOLANDA discussed with daughter about the purpose and benefit of receiving Diabetes education from [address]. Daughter stated that she understands how to manage pt's medical condition, but the challenge is ensuring that pt eat her meals and take medications consistently. Daughter stated that she calls her mom several times during the day to remind her to take meds, eat meals etc.  SW discussed the option of patient attending an adult  program during the week to assist with needs until her personal care is established. Daughter stated that pt would not be interested in attending but she will share this with pt as an option. YOLANDA contacted  [facility] to iniate the UAI screening for personal care. SW advised daughter to contact Mrs. Stone Ryan at [facility] [phone] to provide additional information for screening. [place] [address] Ambulatory Care Coordination Team  [phone]    2/17/20  YOLANDA contacted daughter for f/u. Daughter reported that she/pt submitted the medicaid application. SW explained to daughter the f/u process and services offered though medicaid. SW/daughter also discussed the benefit of having pt's medications bubbled pack and advised her to contact pharmacy to inquire about the process. SW will f/u with Chris Trivedi RD to coordinate a meeting for pt/daughter to receive diabetes education. SW will f/u at a later date.   [place] [address] Ambulatory Care Coordination Team  [phone]  1/31/2020   YOLANDA attempted to contacted patient's daughter to follow up on progress made with completing medicaid application and offer assistance. SW left a voicemail requesting a call back. [place] [address] Ambulatory Care Coordination Team  [phone]    1/15/2020   MARLEN GUERRERO received a call back from [place] [address] providing an update on patients home visit. SW met with patient in the home and spoke with the daughter via phone during the visit. Bessie Moteenamanuel reported there were no concerns observed during the visit. Bessie Motoro reported that patient was alert, verbal and oriented during the visit, as she appropriately shared information and answered questions. However, It is noted that [facility] created goals and interventions to address patients cognitive impairment. Bessie Ruiz discussed with daughter the importance of having Medicaid coverage in place, as it could provide personal care aides to assist patient in the home. SW explained to daughter the concerns that were shared regarding patients care in the home and offered to schedule a date and time to meet with her to complete a Medicaid application and submit a request for UAI screening, but daughter was not able to confirm a date.  YOLANDA will follow up with daughter at a later date to schedule meeting. See [facility] note. [place] [address] Ambulatory Care Coordination Team  [phone]    1/10/2020 3:00pm  MARLEN GUERRERO received a call back from [facility] YOLANDA, Bernarda Hart. CTSATNAM GUERRERO updated her on the concerns related to patient's care and safety in the home. 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 According to patients daughter, patient owns her home and the son lives with her. Patients daughter is involved with her care but does not live with her.      ALMA DELIA GUERRERO made aware of the resources that were provided to daughter regarding the Medicaid process and discussed the possibility of patient participating in [facility] chronic care program for disease management. ALMA DELIA GUERRERO is scheduled to conduct a home visit with patient next week. Due to patients cognition limitations, SW suggested that [facility] SW contact patients son/daughter to schedule a home visit. CTN SW mentioned, pending the outcome of home assessment there may be a need for an APS referral.  SW acknowledged the report given and will follow up with CTN SW at a later date to discuss findings. [place] [address] Ambulatory Care Coordination Team  [phone]    1/9/2020 2:30am  [address] [facility], reported that patient is scheduled to receive a home visit from [address] [place], [place] [address]lynn Sauer on 1-13-20. CTN SW called home health SW, Reji Junior requesting a call back to discuss concerns reg patient's care/safety and address social needs. SW will attempt call at a later date if call is not returned. [place] [address] Ambulatory Care Coordination Team  [phone]    1/7/2020  1:00pm  It is noted that patient was recently discharged from the hospital due to DKA. SW contacted patient's daughter, Pastor Evans, to discuss resources to address patient's needs. Daughter stated that she was busy, but was receptive to taking a few minutes to discuss options related to patient's care. SW explained to daughter the Medicaid process along with the benefits of Medicaid coverage. SW highlighted a few Medicaid services that patient could receive if approved, such as personal care, adult  coverage, assistance with medication cost and alternative placement if needed. Daughter acknowledged the services and stated that her goal is to care for patient in the home. Patient's son live with her and daughter assist with providing care.  SW advised daughter to contact [address] [place] [address] to initiate the application process, as it could take up to 45 days for Medicaid approval.  Daughter understood and stated she and patient will complete the application by the end of the week. Daughter denied any additional resources at this time. SW provided daughter my contact information to contact if needed      SW will contact daughter within the next two weeks to assess progress made with completing application, discuss ACP items and offer assistance if needed. [address] [facility] Team   [facility] Ambulatory Care Coordination Team  [phone]     12/17/2019  1:30pm  SW attempted to contact patient's daughter for follow up. SW left a detailed voicemail requesting a call back, and suggested that she leave on my voicemail best time/date to reach her. SW will follow up at a later date. 12/13/2019  1:25pm  Patient's daughter requested assistance with understanding medicaid process and initiating application. SW contacted daughter however, she reported that she was unable to talk and stated she would contact SW [phone] at 1:30pm.      Petros Deuce will attempt contact on 12-16-19 if call is not returned. YOLANDA will offer assistance with medicaid process and additional resources if needed. [address] [facility] Team     10/27/20 [facility] 10/20-10/25 DKCELINE. Spoke with daughter today, thinks mother is doing well. Advised her that per [address] in yesterday's visit, meds could not be found, unable to locate glucometer and oven door was open for heat. [facility] notified APS of concerns. Daughter says brother to find her meds. Trying to set up Meet and Greet for aide to meet mom so she can start with personal care 5 x week. Suggested she consider higher level of care for mother, daughter not receptive.   CTN to notify Flaquita Conroy, of patient's return to home to see if she can assist with any additional resources. mbt

## 2020-11-06 NOTE — PROGRESS NOTES
Received message from patient's nurse [address] [facility] stating :    Lab called with a critical lav value of glucose of 35. Please advise. Discussion / orders:    Patient already has hypoglycemia protocol in place/ordered. Called patient's nurse and informed her of this and asked her to check bedside glucose and if still hypoglycemic, then administer glucose per protocol. I also ordered bedside glucose testing AC and HS. Please note that this note was dictated using Dragon computer voice recognition software. Quite often unanticipated grammatical, syntax, homophones, and other interpretive errors are inadvertently transcribed by the computer software. Please disregard these errors. Please excuse any errors that have escaped final proofreading.

## 2020-11-07 LAB
BACTERIA SPEC CULT: NORMAL
CHOLEST SERPL-MCNC: 167 MG/DL
DATE LAST DOSE: ABNORMAL
GLUCOSE BLD STRIP.AUTO-MCNC: 128 MG/DL (ref 65–100)
GLUCOSE BLD STRIP.AUTO-MCNC: 136 MG/DL (ref 65–100)
GLUCOSE BLD STRIP.AUTO-MCNC: 140 MG/DL (ref 65–100)
GLUCOSE BLD STRIP.AUTO-MCNC: 157 MG/DL (ref 65–100)
GLUCOSE BLD STRIP.AUTO-MCNC: 40 MG/DL (ref 65–100)
GLUCOSE BLD STRIP.AUTO-MCNC: 47 MG/DL (ref 65–100)
GLUCOSE BLD STRIP.AUTO-MCNC: 98 MG/DL (ref 65–100)
HDLC SERPL-MCNC: 80 MG/DL
HDLC SERPL: 2.1 {RATIO} (ref 0–5)
LDLC SERPL CALC-MCNC: 69.8 MG/DL (ref 0–100)
LIPID PROFILE,FLP: NORMAL
REPORTED DOSE,DOSE: ABNORMAL UNITS
REPORTED DOSE/TIME,TMG: ABNORMAL
SERVICE CMNT-IMP: ABNORMAL
SERVICE CMNT-IMP: NORMAL
SERVICE CMNT-IMP: NORMAL
TRIGL SERPL-MCNC: 86 MG/DL (ref ?–150)
TSH SERPL DL<=0.05 MIU/L-ACNC: 62.1 UIU/ML (ref 0.36–3.74)
VANCOMYCIN TROUGH SERPL-MCNC: 14 UG/ML (ref 5–10)
VLDLC SERPL CALC-MCNC: 17.2 MG/DL

## 2020-11-07 PROCEDURE — 74011636637 HC RX REV CODE- 636/637: Performed by: INTERNAL MEDICINE

## 2020-11-07 PROCEDURE — 74011250636 HC RX REV CODE- 250/636: Performed by: INTERNAL MEDICINE

## 2020-11-07 PROCEDURE — 74011250637 HC RX REV CODE- 250/637: Performed by: INTERNAL MEDICINE

## 2020-11-07 PROCEDURE — 36415 COLL VENOUS BLD VENIPUNCTURE: CPT

## 2020-11-07 PROCEDURE — 80202 ASSAY OF VANCOMYCIN: CPT

## 2020-11-07 PROCEDURE — 74011000250 HC RX REV CODE- 250: Performed by: INTERNAL MEDICINE

## 2020-11-07 PROCEDURE — 80061 LIPID PANEL: CPT

## 2020-11-07 PROCEDURE — 74011250637 HC RX REV CODE- 250/637: Performed by: NURSE PRACTITIONER

## 2020-11-07 PROCEDURE — 82962 GLUCOSE BLOOD TEST: CPT

## 2020-11-07 PROCEDURE — 65660000000 HC RM CCU STEPDOWN

## 2020-11-07 PROCEDURE — 84443 ASSAY THYROID STIM HORMONE: CPT

## 2020-11-07 RX ORDER — INSULIN LISPRO 100 [IU]/ML
INJECTION, SOLUTION INTRAVENOUS; SUBCUTANEOUS
Status: DISCONTINUED | OUTPATIENT
Start: 2020-11-07 | End: 2020-11-08

## 2020-11-07 RX ORDER — INSULIN GLARGINE 100 [IU]/ML
7 INJECTION, SOLUTION SUBCUTANEOUS DAILY
Status: DISCONTINUED | OUTPATIENT
Start: 2020-11-08 | End: 2020-11-07

## 2020-11-07 RX ORDER — INSULIN GLARGINE 100 [IU]/ML
8 INJECTION, SOLUTION SUBCUTANEOUS DAILY
Status: DISCONTINUED | OUTPATIENT
Start: 2020-11-08 | End: 2020-11-09

## 2020-11-07 RX ORDER — INSULIN LISPRO 100 [IU]/ML
1 INJECTION, SOLUTION INTRAVENOUS; SUBCUTANEOUS
Status: DISCONTINUED | OUTPATIENT
Start: 2020-11-08 | End: 2020-11-08

## 2020-11-07 RX ADMIN — VANCOMYCIN HYDROCHLORIDE 750 MG: 750 INJECTION, POWDER, LYOPHILIZED, FOR SOLUTION INTRAVENOUS at 02:22

## 2020-11-07 RX ADMIN — Medication 10 ML: at 15:25

## 2020-11-07 RX ADMIN — Medication 1 AMPULE: at 08:33

## 2020-11-07 RX ADMIN — CLOPIDOGREL BISULFATE 75 MG: 75 TABLET ORAL at 08:28

## 2020-11-07 RX ADMIN — ENOXAPARIN SODIUM 40 MG: 40 INJECTION SUBCUTANEOUS at 08:28

## 2020-11-07 RX ADMIN — AMLODIPINE BESYLATE 5 MG: 5 TABLET ORAL at 08:28

## 2020-11-07 RX ADMIN — LEVOTHYROXINE SODIUM 175 MCG: 0.03 TABLET ORAL at 05:38

## 2020-11-07 RX ADMIN — DEXTROSE MONOHYDRATE 25 G: 25 INJECTION, SOLUTION INTRAVENOUS at 17:19

## 2020-11-07 RX ADMIN — VANCOMYCIN HYDROCHLORIDE 1000 MG: 1 INJECTION, POWDER, LYOPHILIZED, FOR SOLUTION INTRAVENOUS at 15:25

## 2020-11-07 RX ADMIN — INSULIN GLARGINE 8 UNITS: 100 INJECTION, SOLUTION SUBCUTANEOUS at 08:28

## 2020-11-07 RX ADMIN — INSULIN LISPRO 2 UNITS: 100 INJECTION, SOLUTION INTRAVENOUS; SUBCUTANEOUS at 11:28

## 2020-11-07 RX ADMIN — Medication 1 CAPSULE: at 08:28

## 2020-11-07 RX ADMIN — Medication 10 ML: at 05:39

## 2020-11-07 RX ADMIN — INSULIN LISPRO 2 UNITS: 100 INJECTION, SOLUTION INTRAVENOUS; SUBCUTANEOUS at 08:28

## 2020-11-07 RX ADMIN — Medication 10 ML: at 23:37

## 2020-11-07 RX ADMIN — PRAVASTATIN SODIUM 80 MG: 40 TABLET ORAL at 08:28

## 2020-11-07 NOTE — PROGRESS NOTES
Problem: Diabetes Self-Management  Goal: *Disease process and treatment process  Description: Define diabetes and identify own type of diabetes; list 3 options for treating diabetes. Outcome: Progressing Towards Goal  Goal: *Incorporating nutritional management into lifestyle  Description: Describe effect of type, amount and timing of food on blood glucose; list 3 methods for planning meals. Outcome: Progressing Towards Goal  Goal: *Incorporating physical activity into lifestyle  Description: State effect of exercise on blood glucose levels. Outcome: Progressing Towards Goal  Goal: *Developing strategies to promote health/change behavior  Description: Define the ABC's of diabetes; identify appropriate screenings, schedule and personal plan for screenings. Outcome: Progressing Towards Goal  Goal: *Using medications safely  Description: State effect of diabetes medications on diabetes; name diabetes medication taking, action and side effects. Outcome: Progressing Towards Goal  Goal: *Monitoring blood glucose, interpreting and using results  Description: Identify recommended blood glucose targets  and personal targets. Outcome: Progressing Towards Goal  Goal: *Prevention, detection, treatment of acute complications  Description: List symptoms of hyper- and hypoglycemia; describe how to treat low blood sugar and actions for lowering  high blood glucose level. Outcome: Progressing Towards Goal  Goal: *Prevention, detection and treatment of chronic complications  Description: Define the natural course of diabetes and describe the relationship of blood glucose levels to long term complications of diabetes.   Outcome: Progressing Towards Goal  Goal: *Developing strategies to address psychosocial issues  Description: Describe feelings about living with diabetes; identify support needed and support network  Outcome: Progressing Towards Goal  Goal: *Insulin pump training  Outcome: Progressing Towards Goal  Goal: *Sick day guidelines  Outcome: Progressing Towards Goal  Goal: *Patient Specific Goal (EDIT GOAL, INSERT TEXT)  Outcome: Progressing Towards Goal     Problem: Patient Education: Go to Patient Education Activity  Goal: Patient/Family Education  Outcome: Progressing Towards Goal     Problem: Falls - Risk of  Goal: *Absence of Falls  Description: Document Lemon Ashu Fall Risk and appropriate interventions in the flowsheet. Outcome: Progressing Towards Goal  Note: Fall Risk Interventions:  Mobility Interventions: Bed/chair exit alarm    Mentation Interventions: Adequate sleep, hydration, pain control    Medication Interventions: Bed/chair exit alarm    Elimination Interventions: Bed/chair exit alarm, Call light in reach              Problem: Patient Education: Go to Patient Education Activity  Goal: Patient/Family Education  Outcome: Progressing Towards Goal     Problem: Pressure Injury - Risk of  Goal: *Prevention of pressure injury  Description: Document Madhu Scale and appropriate interventions in the flowsheet.   Outcome: Progressing Towards Goal  Note: Pressure Injury Interventions:  Sensory Interventions: Assess changes in LOC    Moisture Interventions: Absorbent underpads, Apply protective barrier, creams and emollients    Activity Interventions: Pressure redistribution bed/mattress(bed type)    Mobility Interventions: Pressure redistribution bed/mattress (bed type)    Nutrition Interventions: Document food/fluid/supplement intake, Offer support with meals,snacks and hydration    Friction and Shear Interventions: Apply protective barrier, creams and emollients                Problem: Patient Education: Go to Patient Education Activity  Goal: Patient/Family Education  Outcome: Progressing Towards Goal     Problem: Patient Education: Go to Patient Education Activity  Goal: Patient/Family Education  Outcome: Progressing Towards Goal

## 2020-11-07 NOTE — PROGRESS NOTES
I notified pt's daughter, davion sosa that the patient was being transferred to room [phone]. Ms Melbourne Spurling acknowledged the information.

## 2020-11-07 NOTE — PROGRESS NOTES
Erna Nichols RN advised me face to face that the patient had a blood sugar of 40 and 47. I gave the patient four of cranberry juice with 2 packets of sugar, 25mg of D50 ampule, and the patient consumed 100% of her dinner which arrived 8 minutes after the initial low blood sugar. Pt was asymptomatic but perked up after eating her meal. Dr Cris Lazo was paged at [phone].

## 2020-11-07 NOTE — PROGRESS NOTES
Hospitalist Progress Note    NAME: Pura Reyes   :  1940   MRN:  [id number]     I reviewed pertinent labs/imaging and discussed plan of care with Dr. Los Soriano who is in agreement. Assessment / Plan:  Weekend cross cover for Dr. Scott Davila    Sepsis  Bacteremia   - Initial blood culture positive for Staph Epi 2/4 bottles (2 strains, MRSA). - Continue current abx (vancomycin). - Continue lactobacillus 1 cap daily. HTN  Dyslipidemia  Remote CVA   - Adequate BP control.   - Continue amlodipine 5 mg po daily. - Continue pravastatin 80 mg po daily. - Continue clopidogrel 75 mg po daily. Hypothyroidism   - Continue levothyroxine 175 mcg po daily. DKA, resolved  DM  Hypoglycemia  - Continue basal (glargine) insulin 8 units sc daily.  - Decrease prandial (lispro) insulin to 1 unit as breakfast and lunch. - Add FSBS with SSI correction scale. Anemia   - H/H overall stable. - OP workup per PCP.  - No tx indicated at this time. 25.0 - 29.9 Overweight / Body mass index is 23.56 kg/m². Code status: Full  Prophylaxis: Lovenox  Recommended Disposition: Home w/Family     Subjective:     Chief Complaint / Reason for Physician Visit  No complaints. Plan of care and pertinent events reviewed with bedside nurse. Review of Systems:  Symptom Y/N Comments  Symptom Y/N Comments   Fever/Chills N   Chest Pain N    Poor Appetite N   Edema N    Cough N   Abdominal Pain N    Sputum N   Joint Pain N    SOB/AGUILERA N   Pruritis/Rash N    Nausea/vomit N   Tolerating PT/OT     Diarrhea N   Tolerating Diet Y    Constipation N   Other       Could NOT obtain due to:      Objective:     VITALS:   Last 24hrs VS reviewed since prior progress note.  Most recent are:  Patient Vitals for the past 24 hrs:   Temp Pulse Resp BP SpO2   20 1501 98.7 °F (37.1 °C) 76 16 (!) 125/57 97 %   20 1127 98.6 °F (37 °C) 74 16 (!) 154/68 100 %   20 0752 98.8 °F (37.1 °C) 75 16 (!) 176/83 98 %   20 0430 98 °F (36.7 °C) 68 16 (!) 156/69 99 %   11/07/20 0015 98 °F (36.7 °C) 70 16 139/67 100 %   11/06/20 1931 98 °F (36.7 °C) 82 16 129/70 100 %       Intake/Output Summary (Last 24 hours) at 11/7/2020 1727  Last data filed at 11/7/2020 1131  Gross per 24 hour   Intake    Output 2500 ml   Net -2500 ml        PHYSICAL EXAM:  General:  A/A/O X 3. NAD. HEENT:  Normocephalic. Sclera anicteric. Mucous membranes moist.    Chest:  Resps even/unlabored with symmetrical CWE. Air entry full. Lungs CTA. No use of accessory muscles. CV:  RRR. No peripheral edema. GI:  Abdomen soft/NT/ND. ABT X 4.    :  Voiding. Neurologic:  Nonfocal.  CN II-XII grossly intact. Speech normal.     Psych:  Cooperative. No anxiety or agitation. Skin:  No rashes or jaundice. Reviewed most current lab test results and cultures  YES  Reviewed most current radiology test results   YES  Review and summation of old records today    NO  Reviewed patient's current orders and MAR    YES  PMH/ reviewed - no change compared to H&P  ________________________________________________________________________  Care Plan discussed with:    Comments   Patient [address]     Consultant                        Multidiciplinary team rounds were held today with , nursing, pharmacist and clinical coordinator. Patient's plan of care was discussed; medications were reviewed and discharge planning was addressed. ________________________________________________________________________  Marlene Nava NP     Procedures: see electronic medical records for all procedures/Xrays and details which were not copied into this note but were reviewed prior to creation of Plan. LABS:  I reviewed today's most current labs and imaging studies.   Pertinent labs include:  Recent Labs     11/06/20  0320 11/05/20  0324   WBC 3.7 3.6   HGB 9.0* 9.6*   HCT 26.5* 28.3*    186     Recent Labs 11/06/20  1353 11/06/20  0320 11/05/20  0324   NA  --  141 136   K 4.1 3.9 4.9   CL  --  110* 108   CO2  --  25 23   GLU  --  35* 38*   BUN  --  10 10   CREA  --  0.51* 0.46*   CA  --  7.8* 7.0*   MG 1.7  --   --        Signed: Jessica Meraz, YANY

## 2020-11-07 NOTE — PROGRESS NOTES
Bedside and Verbal shift change report given to VIN Garvin (oncoming nurse) by VIN Medellin (offgoing nurse). Report included the following information SBAR, Kardex and ED Summary.     Zone Phone:   [phone]        Significant changes during shift:  Continues on ABT therapy              Patient Information     Aren Blackstone  67 y.o.  10/31/2020  8:30 PM by Michelle Gambino MD. Deana Chase was admitted from Home     Problem List               Patient Active Problem List     Diagnosis Date Noted    Vascular dementia without behavioral disturbance ([address] 10/20/2018       Priority: 3 - Three    Severe dehydration 10/31/2020    Septic shock ([address] 10/31/2020    Hyperkalemia 10/20/2020    DKA, type 1 ([address] 10/20/2020    Severe sepsis ([address] 10/20/2020    Candidiasis 10/20/2020    Dehydration 09/16/2020    Diabetic keto-acidosis ([address] 09/16/2020    Lactic acid acidosis 09/16/2020    ONEYDA (acute kidney injury) ([address] 09/16/2020    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 ([address] 08/31/2020    Hypoglycemia 06/21/2020    Hyperglycemia due to type 1 diabetes mellitus ([address] 02/27/2020    Venous insufficiency 01/16/2020    Anemia 12/04/2019    Acute metabolic encephalopathy 42/25/2322    H/O: CVA (cerebrovascular accident) 11/06/2018    DKA (diabetic ketoacidoses) ([address] 06/02/2017    Dyslipidemia 02/06/2015    Hypertension 02/06/2015    Hypothyroidism 02/06/2015    Memory deficit 02/06/2015              Past Medical History:   Diagnosis Date    Diabetes ([address]      Heart failure ([address]       unknown to family    Hypertension      Stroke ([address]              Core Measures:     CVA: No No  CHF:No No  PNA:No No        Activity Status:     OOB to Chair No  Ambulated this shift no   Bed Rest No              DVT prophylaxis:     DVT prophylaxis Med- Yes  DVT prophylaxis SCD or CONCHITA- No         Patient Safety:     Falls Score Total Score: 3  Safety Level_______  Bed Alarm On? Yes  Sitter?  No     Plan for upcoming shift: safety, antibiotics, monitor blood glucose,            Discharge Plan: Yes, home        Active Consults:  IP CONSULT TO HOSPITALIST  IP CONSULT TO INFECTIOUS DISEASES

## 2020-11-07 NOTE — PROGRESS NOTES
I spoke with Dr Olamide Hagan via the phone to advise him of the patient's hypoglycemic episode, subsequent treatment, ending with a recheck of blood sugar which was 136. Dr Olamide Hagan offered no further treatment.

## 2020-11-07 NOTE — PROGRESS NOTES
Pharmacy Automatic Renal Dosing Protocol - Antimicrobials    Indication for Antimicrobials: bacteremia, unknown source    Current Regimen of Each Antimicrobial:  Vancomycin 750mg IV q12h - started  day 6    Previous Antimicrobial Therapy:  Zosyn 3.375gm IV q8h - started  day 2    Vancomycin  Trough Goal Level:   15-20 (AUC: 400 - 600 mg/hr/Liter/day)     Date Dose & Interval Measured (mcg/mL) Extrapolated (mcg/mL)   11/3 00:04 650mg IV q24h 7.9     1300 500 mg q12h 7.6     @ 0216 750 mg every 12 hours 14 10.     Date and Time of Trough:  @ 0300    Significant Cultures:   10/31 PBCx - MRSE - final  10/31 UCx - ng- final   PBCx - ng- prelim    Radiology / Imaging results: (X-ray, CT scan or MRI):    CXR: With probable right basilar subsegmental atelectasis but early infiltrate cannot be excluded    Paralysis, amputations, malnutrition: none    Labs:  Recent Labs     20  0320 20  0324   CREA 0.51* 0.46*   BUN 10 10   WBC 3.7 3.6     Temp (24hrs), Av.2 °F (36.8 °C), Min:97.8 °F (36.6 °C), Max:98.8 °F (37.1 °C)      Is the Patient on Dialysis? No    Creatinine Clearance (mL/min): (Ideal Body Weight): 54.8    Impression/Plan:   Vancomycin trough resulted as subtherapeutic at 10.5 mcg/mL. Will change to 1000 mg IV every 12 hours for an estimated trough of 14 mcg/mL and AUC of 557. Antimicrobial stop date - pending     Pharmacy will follow daily and adjust medications as appropriate for renal function and/or serum levels.     Thank you,  Bill Ho, PHARMD

## 2020-11-08 LAB
ANION GAP SERPL CALC-SCNC: 5 MMOL/L (ref 5–15)
BASOPHILS # BLD: 0 K/UL (ref 0–0.1)
BASOPHILS NFR BLD: 0 % (ref 0–1)
BLASTS NFR BLD MANUAL: 0 %
BUN SERPL-MCNC: 12 MG/DL (ref 6–20)
BUN/CREAT SERPL: 24 (ref 12–20)
CALCIUM SERPL-MCNC: 8 MG/DL (ref 8.5–10.1)
CHLORIDE SERPL-SCNC: 106 MMOL/L (ref 97–108)
CO2 SERPL-SCNC: 28 MMOL/L (ref 21–32)
CREAT SERPL-MCNC: 0.5 MG/DL (ref 0.55–1.02)
DIFFERENTIAL METHOD BLD: ABNORMAL
EOSINOPHIL # BLD: 0 K/UL (ref 0–0.4)
EOSINOPHIL NFR BLD: 1 % (ref 0–7)
ERYTHROCYTE [DISTWIDTH] IN BLOOD BY AUTOMATED COUNT: 17.5 % (ref 11.5–14.5)
GLUCOSE BLD STRIP.AUTO-MCNC: 139 MG/DL (ref 65–100)
GLUCOSE BLD STRIP.AUTO-MCNC: 241 MG/DL (ref 65–100)
GLUCOSE BLD STRIP.AUTO-MCNC: 292 MG/DL (ref 65–100)
GLUCOSE BLD STRIP.AUTO-MCNC: 368 MG/DL (ref 65–100)
GLUCOSE SERPL-MCNC: 167 MG/DL (ref 65–100)
HCT VFR BLD AUTO: 25.2 % (ref 35–47)
HGB BLD-MCNC: 8.4 G/DL (ref 11.5–16)
IMM GRANULOCYTES # BLD AUTO: 0 K/UL
IMM GRANULOCYTES NFR BLD AUTO: 0 %
LYMPHOCYTES # BLD: 1.2 K/UL (ref 0.8–3.5)
LYMPHOCYTES NFR BLD: 35 % (ref 12–49)
MAGNESIUM SERPL-MCNC: 1.8 MG/DL (ref 1.6–2.4)
MCH RBC QN AUTO: 31.6 PG (ref 26–34)
MCHC RBC AUTO-ENTMCNC: 33.3 G/DL (ref 30–36.5)
MCV RBC AUTO: 94.7 FL (ref 80–99)
METAMYELOCYTES NFR BLD MANUAL: 0 %
MONOCYTES # BLD: 0.3 K/UL (ref 0–1)
MONOCYTES NFR BLD: 10 % (ref 5–13)
MYELOCYTES NFR BLD MANUAL: 0 %
NEUTS BAND NFR BLD MANUAL: 0 % (ref 0–6)
NEUTS SEG # BLD: 1.8 K/UL (ref 1.8–8)
NEUTS SEG NFR BLD: 54 % (ref 32–75)
NRBC # BLD: 0 K/UL (ref 0–0.01)
NRBC BLD-RTO: 0 PER 100 WBC
OTHER CELLS NFR BLD MANUAL: 0 %
PHOSPHATE SERPL-MCNC: 3.3 MG/DL (ref 2.6–4.7)
PLATELET # BLD AUTO: 238 K/UL (ref 150–400)
PMV BLD AUTO: 9.7 FL (ref 8.9–12.9)
POTASSIUM SERPL-SCNC: 3.9 MMOL/L (ref 3.5–5.1)
PROMYELOCYTES NFR BLD MANUAL: 0 %
RBC # BLD AUTO: 2.66 M/UL (ref 3.8–5.2)
RBC MORPH BLD: ABNORMAL
SERVICE CMNT-IMP: ABNORMAL
SODIUM SERPL-SCNC: 139 MMOL/L (ref 136–145)
WBC # BLD AUTO: 3.3 K/UL (ref 3.6–11)

## 2020-11-08 PROCEDURE — 74011250636 HC RX REV CODE- 250/636: Performed by: INTERNAL MEDICINE

## 2020-11-08 PROCEDURE — 74011250637 HC RX REV CODE- 250/637: Performed by: NURSE PRACTITIONER

## 2020-11-08 PROCEDURE — 36415 COLL VENOUS BLD VENIPUNCTURE: CPT

## 2020-11-08 PROCEDURE — 74011250637 HC RX REV CODE- 250/637: Performed by: INTERNAL MEDICINE

## 2020-11-08 PROCEDURE — 80048 BASIC METABOLIC PNL TOTAL CA: CPT

## 2020-11-08 PROCEDURE — 99232 SBSQ HOSP IP/OBS MODERATE 35: CPT | Performed by: INTERNAL MEDICINE

## 2020-11-08 PROCEDURE — 74011636637 HC RX REV CODE- 636/637: Performed by: NURSE PRACTITIONER

## 2020-11-08 PROCEDURE — 82962 GLUCOSE BLOOD TEST: CPT

## 2020-11-08 PROCEDURE — 85027 COMPLETE CBC AUTOMATED: CPT

## 2020-11-08 PROCEDURE — 84100 ASSAY OF PHOSPHORUS: CPT

## 2020-11-08 PROCEDURE — 65660000000 HC RM CCU STEPDOWN

## 2020-11-08 PROCEDURE — 83735 ASSAY OF MAGNESIUM: CPT

## 2020-11-08 RX ORDER — INSULIN LISPRO 100 [IU]/ML
8 INJECTION, SOLUTION INTRAVENOUS; SUBCUTANEOUS ONCE
Status: COMPLETED | OUTPATIENT
Start: 2020-11-08 | End: 2020-11-08

## 2020-11-08 RX ORDER — INSULIN LISPRO 100 [IU]/ML
3 INJECTION, SOLUTION INTRAVENOUS; SUBCUTANEOUS
Status: DISCONTINUED | OUTPATIENT
Start: 2020-11-09 | End: 2020-11-10

## 2020-11-08 RX ORDER — INSULIN LISPRO 100 [IU]/ML
2 INJECTION, SOLUTION INTRAVENOUS; SUBCUTANEOUS
Status: DISCONTINUED | OUTPATIENT
Start: 2020-11-09 | End: 2020-11-10

## 2020-11-08 RX ADMIN — VANCOMYCIN HYDROCHLORIDE 1000 MG: 1 INJECTION, POWDER, LYOPHILIZED, FOR SOLUTION INTRAVENOUS at 03:33

## 2020-11-08 RX ADMIN — Medication 10 ML: at 21:09

## 2020-11-08 RX ADMIN — PRAVASTATIN SODIUM 80 MG: 40 TABLET ORAL at 08:12

## 2020-11-08 RX ADMIN — Medication 10 ML: at 08:18

## 2020-11-08 RX ADMIN — INSULIN LISPRO 5 UNITS: 100 INJECTION, SOLUTION INTRAVENOUS; SUBCUTANEOUS at 08:13

## 2020-11-08 RX ADMIN — CLOPIDOGREL BISULFATE 75 MG: 75 TABLET ORAL at 08:11

## 2020-11-08 RX ADMIN — INSULIN LISPRO 1 UNITS: 100 INJECTION, SOLUTION INTRAVENOUS; SUBCUTANEOUS at 11:18

## 2020-11-08 RX ADMIN — AMLODIPINE BESYLATE 5 MG: 5 TABLET ORAL at 08:11

## 2020-11-08 RX ADMIN — INSULIN LISPRO 1 UNITS: 100 INJECTION, SOLUTION INTRAVENOUS; SUBCUTANEOUS at 08:13

## 2020-11-08 RX ADMIN — Medication 10 ML: at 11:19

## 2020-11-08 RX ADMIN — LEVOTHYROXINE SODIUM 175 MCG: 0.03 TABLET ORAL at 06:33

## 2020-11-08 RX ADMIN — INSULIN LISPRO 8 UNITS: 100 INJECTION, SOLUTION INTRAVENOUS; SUBCUTANEOUS at 11:17

## 2020-11-08 RX ADMIN — ENOXAPARIN SODIUM 40 MG: 40 INJECTION SUBCUTANEOUS at 08:12

## 2020-11-08 RX ADMIN — Medication 1 CAPSULE: at 08:12

## 2020-11-08 RX ADMIN — INSULIN GLARGINE 8 UNITS: 100 INJECTION, SOLUTION SUBCUTANEOUS at 08:12

## 2020-11-08 RX ADMIN — VANCOMYCIN HYDROCHLORIDE 1000 MG: 1 INJECTION, POWDER, LYOPHILIZED, FOR SOLUTION INTRAVENOUS at 15:17

## 2020-11-08 NOTE — PROGRESS NOTES
Hospitalist Progress Note    NAME: Pura Reyes   :  1940   MRN:  [id number]     I reviewed pertinent labs/imaging and discussed plan of care with Dr. Los Soriano who is in agreement. Assessment / Plan:  Weekend cross cover for Dr. Scott Davila, it appears that Dr. Scott Davila is placing orders on this patient therefore I will defer all treatment to him. Sepsis  Bacteremia   - Initial blood culture positive for Staph Epi 2/4 bottles (2 strains, MRSA). - Continue current abx (vancomycin). - Continue lactobacillus 1 cap daily. HTN  Dyslipidemia  Remote CVA   - Adequate BP control.   - Continue amlodipine 5 mg po daily. - Continue pravastatin 80 mg po daily. - Continue clopidogrel 75 mg po daily. Hypothyroidism   - Continue levothyroxine 175 mcg po daily. DKA, resolved  DM  Hypoglycemia  - Patient has had no further hypoglycemic episodes, in fact she is hyperglycemic today. Insulin has been adjusted by Dr. Scott Davila. I will defer treatment to him. Anemia   - H/H overall stable. - OP workup per PCP.  - No tx indicated at this time. 25.0 - 29.9 Overweight / Body mass index is 23.56 kg/m². Code status: Full  Prophylaxis: Lovenox  Recommended Disposition: Home w/Family     Subjective:     Chief Complaint / Reason for Physician Visit  No complaints. Plan of care and pertinent events reviewed with bedside nurse. Review of Systems:  Symptom Y/N Comments  Symptom Y/N Comments   Fever/Chills N   Chest Pain N    Poor Appetite N   Edema N    Cough N   Abdominal Pain N    Sputum N   Joint Pain N    SOB/AGUILERA N   Pruritis/Rash N    Nausea/vomit N   Tolerating PT/OT     Diarrhea N   Tolerating Diet Y    Constipation N   Other       Could NOT obtain due to:      Objective:     VITALS:   Last 24hrs VS reviewed since prior progress note.  Most recent are:  Patient Vitals for the past 24 hrs:   Temp Pulse Resp BP SpO2   20 1048 98.3 °F (36.8 °C) 75 16 (!) 149/65 98 %   20 0743 98.2 °F (36.8 °C) 77 16 (!) 163/75 97 %   11/08/20 0350 98.6 °F (37 °C) 70 16 (!) 143/65 97 %   11/07/20 2339 98.6 °F (37 °C) 70 16 137/70 96 %   11/07/20 2057 98.7 °F (37.1 °C) 67 18 (!) 149/77    11/07/20 2018 98.1 °F (36.7 °C) 71 18 (!) 146/69 99 %       Intake/Output Summary (Last 24 hours) at 11/8/2020 1502  Last data filed at 11/8/2020 1122  Gross per 24 hour   Intake 500 ml   Output 800 ml   Net -300 ml        PHYSICAL EXAM:  General:  A/A/O. Disoriented to time intermittently. NAD. HEENT:  Normocephalic. Sclera anicteric. Mucous membranes moist.    Chest:  Resps even/unlabored with symmetrical CWE. Air entry full. Lungs CTA. No use of accessory muscles. CV:  RRR. No peripheral edema. GI:  Abdomen soft/NT/ND. ABT X 4.    :  Voiding. Neurologic:  Nonfocal.  CN II-XII grossly intact. Speech normal.     Psych:  Cooperative. No anxiety or agitation. Skin:  No rashes or jaundice. Reviewed most current lab test results and cultures  YES  Reviewed most current radiology test results   YES  Review and summation of old records today    NO  Reviewed patient's current orders and MAR    YES  PMH/ reviewed - no change compared to H&P  ________________________________________________________________________  Care Plan discussed with:    Comments   Patient [address]     Consultant                        Multidiciplinary team rounds were held today with , nursing, pharmacist and clinical coordinator. Patient's plan of care was discussed; medications were reviewed and discharge planning was addressed. ________________________________________________________________________  Theresa Thornton NP     Procedures: see electronic medical records for all procedures/Xrays and details which were not copied into this note but were reviewed prior to creation of Plan. LABS:  I reviewed today's most current labs and imaging studies.   Pertinent labs include:  Recent Labs     11/08/20  0256 11/06/20  0320   WBC 3.3* 3.7   HGB 8.4* 9.0*   HCT 25.2* 26.5*    209     Recent Labs     11/08/20  0256 11/06/20  1353 11/06/20  0320     --  141   K 3.9 4.1 3.9     --  110*   CO2 28  --  25   *  --  35*   BUN 12  --  10   CREA 0.50*  --  0.51*   CA 8.0*  --  7.8*   MG 1.8 1.7  --    PHOS 3.3  --   --        Signed: Kathya Cortez, NP

## 2020-11-08 NOTE — PROGRESS NOTES
Infectious Disease Progress          IMPRESSION:   · Coagulase-negative staph bacteremia (Ox R), blood culture+ 10/31-2/4 different colony type in 2nd of 4 bottles ?contaminant. In view of initial clinical presentation, also multiple recent hospitalizations, we will continue vancomycin IV for total of 7 days. · Repeat blood cultures11/2- NG.  · TTE negative for vegetation  · Shock septic vs hypovolemic, s/p fluids and pressors  · DKA resolved  · S/p ONEYDA   · Multiple admissions for poorly controlled blood sugars/DKA  · Dementia  · History of CVA  · History of thyroidectomy on Synthroid       PLAN:      ·  Vancomycin 750 mg IV every 12 x 7 days end date 11/8. Pharmacy dosing per protocol, target trough 15-20. Monitor creatinine. · Patient encouraged to drink more fluids  · Blood sugar control. Patient seen today. No complaints except\" I am waiting to be home\". No fever, chills. No skin breakdown. The patient is a 67years old female  with past medical history of diabetes mellitus, hypertension, CVA, dementia who presented to emergency department due to elevated blood sugar around 1200. Per Ed note,on  arrival to the ER, patient was found to be in DKA & was started on DKA protocol. Her blood pressure had  in the ED for which she was given IV fluid bolus 30 cc/kg with no response, then she was started on IV vasopressors . Triple-lumen catheter in the right IJ was placed by the ED physician. As per history her daughter Jocelyne Cruz patient had thought pt forgot to take her insulin and her blood sugar had been  elevated for today prior to the hospital.  Patient had blood cultures drawn on 10/31, now positive for gram-positive cocci 2/4. Urine cultureNG <1000, UA -unremarkable except yeast, few epithelial cells, WBC 10-20. Patient seen today in ICU. She is off pressors.   Awake and oriented x3  Of note patient has had multiple admissions recently for unstable blood sugars, DKA last admission 10/20-10/25, before that 9/16-9/21, 9/01-9/04  Patient Active Problem List   Diagnosis Code    Dyslipidemia E78.5    Hypertension I10    Hypothyroidism E03.9    Memory deficit R41.3    DKA (diabetic ketoacidoses) ([facility] E11.10    Vascular dementia without behavioral disturbance (HCC) F01.50    H/O: CVA (cerebrovascular accident) Z80.78    Acute metabolic encephalopathy Y45.32    Anemia D64.9    Venous insufficiency I87.2    Hyperglycemia due to type 1 diabetes mellitus ([facility] E10.65    Hypoglycemia E16.2    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 (HCC) E10.649    Dehydration E86.0    Diabetic keto-acidosis ([facility]) E11.10    Lactic acid acidosis E87.2    [facility] (acute kidney [facility]) [facility] N17.9    Hyperkalemia E87.5    DKA, type 1 ([facility]) E10.10    Severe sepsis ([facility]) A41.9, R65.20    Candidiasis B37.9    Severe dehydration E86.0    Septic shock ([facility]) A41.9, R65.21     Past Medical History:   Diagnosis Date    Diabetes ([facility]     Heart failure ([facility]     unknown to family    Hypertension     Stroke ([facility]       Family History   Problem Relation Age of Onset    Diabetes Father     No Known Problems Mother       Social History     Tobacco Use    Smoking status: Never Smoker    Smokeless tobacco: Never Used   Substance Use Topics    Alcohol use: No     Comment: been years     Past Surgical History:   Procedure Laterality Date    HX GYN      HX HEENT      thyroidectomy    HX HYSTERECTOMY      REMOVAL GALLBLADDER      THYROIDECTOMY        Prior to Admission medications    Medication Sig Start Date End Date Taking? Authorizing Provider   insulin degludec Vikki Sksushila FlexTouch U-100) 100 unit/mL (3 mL) inpn 12 Units by SubCUTAneous route daily. 10/25/20  Yes Refugio Gardner MD   insulin lispro (HUMALOG) 100 unit/mL kwikpen 2 units before breakfast and lunch only 10/25/20  Yes Refugio Gardner MD   amLODIPine (NORVASC) 10 mg tablet Take 1 Tab by mouth daily.  10/5/20  Yes Refugio Gardner MD losartan-hydroCHLOROthiazide (HYZAAR) 100-25 mg per tablet Take 1 Tab by mouth daily. Yes Provider, Historical   metoprolol succinate (TOPROL-XL) 25 mg XL tablet TAKE 1 TABLET BY MOUTH EVERY DAY 20  Yes Edwin De La Torre MD   pravastatin (PRAVACHOL) 80 mg tablet TAKE 1 TABLET BY MOUTH at bedtime 20  Yes Edwin De La Torre MD   levothyroxine (SYNTHROID) 175 mcg tablet TAKE 1 TABLET BY MOUTH EVERY DAY 20  Yes Edwin De La Torre MD   clopidogreL (PLAVIX) 75 mg tab TAKE 1 TABLET BY MOUTH EVERY DAY 20  Yes Edwin De La Torre MD   brimonidine (ALPHAGAN) 0.15 % ophthalmic solution Administer 1 Drop to both eyes two (2) times a day. 1/30/15  Yes Provider, Historical   zinc oxide 20 % ointment Apply 1 Applicator to affected area two (2) times a day. thin layer gluteal cleft    Provider, Historical     No Known Allergies     Review of Systems:  A comprehensive review of systems was negative except for that written in the History of Present Illness. 10 point review of systems obtained . All other systems negative    Objective:   Blood pressure (!) 149/65, pulse 75, temperature 98.3 °F (36.8 °C), resp. rate 16, height 5' 3\" (1.6 m), weight 133 lb (60.3 kg), SpO2 98 %.   Temp (24hrs), Av.5 °F (36.9 °C), Min:98.1 °F (36.7 °C), Max:98.7 °F (37.1 °C)    Current Facility-Administered Medications   Medication Dose Route Frequency    vancomycin (VANCOCIN) 1,000 mg in 0.9% sodium chloride (MBP/ADV) 250 mL  1,000 mg IntraVENous Q12H    insulin glargine (LANTUS) injection 8 Units  8 Units SubCUTAneous DAILY    insulin lispro (HUMALOG) injection 1 Units  1 Units SubCUTAneous ACB    insulin lispro (HUMALOG) injection 1 Units  1 Units SubCUTAneous ACL    insulin lispro (HUMALOG) injection   SubCUTAneous AC&HS    amLODIPine (NORVASC) tablet 5 mg  5 mg Oral DAILY    lactobac ac& pc-s.therm-b.anim (GALILEA Q/RISAQUAD)  1 Cap Oral DAILY    glucose chewable tablet 16 g  4 Tab Oral PRN    dextrose (D50W) injection syrg 12.5-25 g  12.5-25 g IntraVENous PRN    glucagon (GLUCAGEN) injection 1 mg  1 mg IntraMUSCular PRN    clopidogreL (PLAVIX) tablet 75 mg  75 mg Oral DAILY    levothyroxine (SYNTHROID) tablet 175 mcg  175 mcg Oral 6am    pravastatin (PRAVACHOL) tablet 80 mg  80 mg Oral DAILY    sodium chloride (NS) flush 5-40 mL  5-40 mL IntraVENous Q8H    sodium chloride (NS) flush 5-40 mL  5-40 mL IntraVENous PRN    acetaminophen (TYLENOL) tablet 650 mg  650 mg Oral Q6H PRN    Or    acetaminophen (TYLENOL) suppository 650 mg  650 mg Rectal Q6H PRN    polyethylene glycol (MIRALAX) packet 17 g  17 g Oral DAILY PRN    promethazine (PHENERGAN) tablet 12.5 mg  12.5 mg Oral Q6H PRN    Or    ondansetron (ZOFRAN) injection 4 mg  4 mg IntraVENous Q6H PRN    enoxaparin (LOVENOX) injection 40 mg  40 mg SubCUTAneous DAILY        Exam:    General:  Awake, cooperative,   Eyes:  Sclera anicteric. Pupils equally round and reactive to light. Mouth/Throat: Mucous membranes normal, oral pharynx clear   Neck: Supple   Lungs:   reduced auscultation bases   CV:  Regular rate and rhythm,no murmur, click, rub or gallop   Abdomen:   Soft, non-tender.  bowel sounds normal. non-distended   Extremities: No  edema   Skin: Skin color, texture, turgor normal. no skin breakdown   Lymph nodes: Cervical and supraclavicular normal   Musculoskeletal: No swelling or deformity   Lines/Devices:  Intact, no erythema, drainage or tenderness   Psych: Awake and oriented, normal mood affect       Data Reviewed:     Lab Results   Component Value Date/Time    Culture result: NO GROWTH 5 DAYS 11/02/2020 10:47 AM    Culture result: No growth (<1,000 CFU/ML) 10/31/2020 11:16 PM    Culture result: (A) 10/31/2020 08:10 PM     STAPHYLOCOCCUS EPIDERMIDIS (OXACILLIN RESISTANT) GROWING IN 1 OF 4 BOTTLES DRAWN (NO SITE INDICATED)    Culture result: (A) 10/31/2020 08:10 PM     STAPHYLOCOCCUS EPIDERMIDIS (OXACILLIN RESISTANT) (2ND COLONY TYPE/STRAIN) GROWING IN THE 2ND OF 4 BOTTLES DRAWN (NO SITE INDICATED)    Culture result: REMAINING BOTTLE(S) HAS/HAVE NO GROWTH IN 5 DAYS 10/31/2020 08:10 PM          XR Results (most recent):  Results from Hospital Encounter encounter on 10/31/20   XR CHEST PORT    Narrative EXAM: XR CHEST PORT    INDICATION: Left pleural effusion    COMPARISON: 10/31/2020    FINDINGS: A portable AP radiograph of the chest was obtained at 0851 hours. The  patient is on a cardiac monitor. The right IJ line has been removed. A complex  linear airspace process is present at the right lung base. A small left pleural  effusion persists. Inferior to the right scapula is a subcentimeter  calcification that may be soft tissue or vascular in nature. Impression IMPRESSION:     Stable small left pleural effusion  With probable right basilar subsegmental atelectasis but early infiltrate cannot  be excluded               ICD-10-CM ICD-9-CM    1. Diabetic ketoacidosis with coma associated with type 1 diabetes mellitus ([facility])  E10.11 250.33    2. Acute kidney injury ([facility]  N17.9 584.9    3. Hypotension, unspecified hypotension type  I95.9 458.9    4. Metabolic acidosis  T10.3 061.0    5. Diabetic ketoacidosis without coma associated with type 2 diabetes mellitus ([facility])  E11.10 250.12    6. Gram-positive cocci bacteremia  R78.81 790.7      041.89    7. Sepsis with acute renal failure and renal cortical necrosis, due to unspecified organism, unspecified whether septic shock present ([facility]  A41.9 038.9     R65.20 995.91     N17.1 584.6    8. Diabetic ketoacidosis without coma associated with type 1 diabetes mellitus ([facility])  E10.10 250.13    9. ONEYDA (acute kidney injury) ([facility]  N17.9 584.9    10. Acute metabolic encephalopathy  M21.97 348.31    11. Dehydration  E86.0 276.51    12. Coagulase negative Staphylococcus bacteremia  R78.81 790.7     B95.7 041.19    13. Hypothyroidism due to medication  E03.2 244.8      E980.5    14.  Septic shock ([facility])  A41.9 038.9     R65.21 785.52 995.92    15. Severe dehydration  E86.0 276.51    16. Bacteremia due to methicillin resistant Staphylococcus epidermidis  R78.81 790.7     B95.7 041.19     Z16.29     17. NSVT (nonsustained ventricular tachycardia) ([facility])  I47.2 427.1            Antibiotic History  Vancomycin ,s/p  Zosyn IV  I have discussed the diagnosis with the patient and the intended plan as seen in the above orders. I have discussed medication side effects and warnings with the patient as well.     Reviewed test results at length with patient    Signed By: Kitty Jones MD FACP    November 8, 2020

## 2020-11-08 NOTE — PROGRESS NOTES
Bedside shift change report given to VIN Monteiro (oncoming nurse) by Tosin Sullivan RN (offgoing nurse).  Report included the following information SBAR, Kardex, Recent Results and Quality Measures

## 2020-11-08 NOTE — PROGRESS NOTES
Pharmacy Automatic Renal Dosing Protocol - Antimicrobials    Indication for Antimicrobials: bacteremia, unknown source    Current Regimen of Each Antimicrobial:  Vancomycin 1000mg IV q12h - started  day 8    Previous Antimicrobial Therapy:  Zosyn 3.375gm IV q8h - started  day 2    Vancomycin  Trough Goal Level:   15-20 (AUC: 400 - 600 mg/hr/Liter/day)     Date Dose & Interval Measured (mcg/mL) Extrapolated (mcg/mL)   11/3 00:04 650 mg IV q24h 7.9     1300 500 mg IV q12h 7.6     0216 750 mg IV q12h 14 10.5    02:30 1000mg IV q12h       Significant Cultures:   10/31 PBCx - MRSE - final  10/31 UCx - ng- final   PBCx - ng- final    Radiology / Imaging results: (X-ray, CT scan or MRI):    CXR: With probable right basilar subsegmental atelectasis but early infiltrate cannot be excluded    Paralysis, amputations, malnutrition: none    Labs:  Recent Labs     20  0256 20  0320   CREA 0.50* 0.51*   BUN 12 10   WBC 3.3* 3.7     Temp (24hrs), Av.5 °F (36.9 °C), Min:98.1 °F (36.7 °C), Max:98.7 °F (37.1 °C)    Creatinine Clearance (mL/min):   Estimated Creatinine Clearance: 54.8 mL/min (A) (by C-G formula based on SCr of 0.5 mg/dL (L)). Estimated Creatinine Clearance (using IBW):54.8 mL/min    Allergies:  NKA    Impression/Plan:   Afebrile, SCr stable, WBC wnl, pt is taking other PO meds and on diabetic diet   PBCx negative  MRSE is also sensitive to Doxycycline so could complete the coarse with Doxy PO  Continue Vancomycin regimen  Antimicrobial stop date - pending     Pharmacy will follow daily and adjust medications as appropriate for renal function and/or serum levels.     Thank you,  Chadd Shaw, [facility]

## 2020-11-08 NOTE — PROGRESS NOTES
[facility]      [address]  [phone]      Cardiology Progress Note      11/8/2020 5:04 PM    Admit Date: 10/31/2020    Admit Diagnosis:   DKA (diabetic ketoacidoses) ([facility] [E11.10]; Septic shock ([facility] [A41.9, R65.21]; Severe dehydration [E86.0]    Subjective:     Selam Ross     No complaints    Visit Vitals  BP (!) 115/57   Pulse 77   Temp 98.5 °F (36.9 °C)   Resp 18   Ht 5' 3\" (1.6 m)   Wt 133 lb (60.3 kg)   SpO2 97%   BMI 23.56 kg/m²       Current Facility-Administered Medications   Medication Dose Route Frequency    [START ON 11/9/2020] insulin lispro (HUMALOG) injection 3 Units  3 Units SubCUTAneous ACB    [START ON 11/9/2020] insulin lispro (HUMALOG) injection 2 Units  2 Units SubCUTAneous ACL    vancomycin (VANCOCIN) 1,000 mg in 0.9% sodium chloride (MBP/ADV) 250 mL  1,000 mg IntraVENous Q12H    insulin glargine (LANTUS) injection 8 Units  8 Units SubCUTAneous DAILY    amLODIPine (NORVASC) tablet 5 mg  5 mg Oral DAILY    lactobac ac& pc-s.therm-b.anim (GALILEA Q/RISAQUAD)  1 Cap Oral DAILY    glucose chewable tablet 16 g  4 Tab Oral PRN    dextrose (D50W) injection syrg 12.5-25 g  12.5-25 g IntraVENous PRN    glucagon (GLUCAGEN) injection 1 mg  1 mg IntraMUSCular PRN    clopidogreL (PLAVIX) tablet 75 mg  75 mg Oral DAILY    levothyroxine (SYNTHROID) tablet 175 mcg  175 mcg Oral 6am    pravastatin (PRAVACHOL) tablet 80 mg  80 mg Oral DAILY    sodium chloride (NS) flush 5-40 mL  5-40 mL IntraVENous Q8H    sodium chloride (NS) flush 5-40 mL  5-40 mL IntraVENous PRN    acetaminophen (TYLENOL) tablet 650 mg  650 mg Oral Q6H PRN    Or    acetaminophen (TYLENOL) suppository 650 mg  650 mg Rectal Q6H PRN    polyethylene glycol (MIRALAX) packet 17 g  17 g Oral DAILY PRN    promethazine (PHENERGAN) tablet 12.5 mg  12.5 mg Oral Q6H PRN    Or    ondansetron (ZOFRAN) injection 4 mg  4 mg IntraVENous Q6H PRN    enoxaparin (LOVENOX) injection 40 mg  40 mg SubCUTAneous DAILY       Objective:      Physical Exam:  General Appearance:    Chest:   Clear  Cardiovascular: RRR  Extremities: no edema  Skin:  Warm and dry.     Data Review:   Recent Labs     11/08/20  0256 11/06/20  0320   WBC 3.3* 3.7   HGB 8.4* 9.0*   HCT 25.2* 26.5*    209     Recent Labs     11/08/20  0256 11/06/20  1353 11/06/20  0320     --  141   K 3.9 4.1 3.9     --  110*   CO2 28  --  25   *  --  35*   BUN 12  --  10   CREA 0.50*  --  0.51*   CA 8.0*  --  7.8*   MG 1.8 1.7  --    PHOS 3.3  --   --        No results for input(s): TROIQ, CPK, CKMB in the last 72 hours.       Intake/Output Summary (Last 24 hours) at 11/8/2020 1704  Last data filed at 11/8/2020 1605  Gross per 24 hour   Intake 750 ml   Output 800 ml   Net -50 ml        Telemetry:   EKG:  Cxray:    Assessment:     Active Problems:    DKA (diabetic ketoacidoses) ([facility] (6/2/2017)      Severe dehydration (10/31/2020)      Septic shock ([facility] (10/31/2020)        Plan:     No further sig arrhythmias    Rock Jeff M.D., [facility]

## 2020-11-08 NOTE — PROGRESS NOTES
Problem: Diabetes Self-Management  Goal: *Disease process and treatment process  Description: Define diabetes and identify own type of diabetes; list 3 options for treating diabetes. Outcome: Progressing Towards Goal  Goal: *Incorporating nutritional management into lifestyle  Description: Describe effect of type, amount and timing of food on blood glucose; list 3 methods for planning meals. Outcome: Progressing Towards Goal  Goal: *Incorporating physical activity into lifestyle  Description: State effect of exercise on blood glucose levels. Outcome: Progressing Towards Goal  Goal: *Developing strategies to promote health/change behavior  Description: Define the ABC's of diabetes; identify appropriate screenings, schedule and personal plan for screenings. Outcome: Progressing Towards Goal  Goal: *Using medications safely  Description: State effect of diabetes medications on diabetes; name diabetes medication taking, action and side effects. Outcome: Progressing Towards Goal  Goal: *Monitoring blood glucose, interpreting and using results  Description: Identify recommended blood glucose targets  and personal targets. Outcome: Progressing Towards Goal  Goal: *Prevention, detection, treatment of acute complications  Description: List symptoms of hyper- and hypoglycemia; describe how to treat low blood sugar and actions for lowering  high blood glucose level. Outcome: Progressing Towards Goal  Goal: *Prevention, detection and treatment of chronic complications  Description: Define the natural course of diabetes and describe the relationship of blood glucose levels to long term complications of diabetes.   Outcome: Progressing Towards Goal  Goal: *Developing strategies to address psychosocial issues  Description: Describe feelings about living with diabetes; identify support needed and support network  Outcome: Progressing Towards Goal  Goal: *Insulin pump training  Outcome: Progressing Towards Goal  Goal: *Sick day guidelines  Outcome: Progressing Towards Goal  Goal: *Patient Specific Goal (EDIT GOAL, INSERT TEXT)  Outcome: Progressing Towards Goal     Problem: Falls - Risk of  Goal: *Absence of Falls  Description: Document Tyler Fall Risk and appropriate interventions in the flowsheet. Outcome: Progressing Towards Goal  Note: Fall Risk Interventions:  Mobility Interventions: Assess mobility with egress test, Communicate number of staff needed for ambulation/transfer, OT consult for ADLs, Patient to call before getting OOB, Utilize walker, cane, or other assistive device, Utilize gait belt for transfers/ambulation, Strengthening exercises (ROM-active/passive), PT Consult for assist device competence, PT Consult for mobility concerns, Bed/chair exit alarm    Mentation Interventions: Adequate sleep, hydration, pain control, Bed/chair exit alarm, Door open when patient unattended, Evaluate medications/consider consulting pharmacy, Eyeglasses and hearing aids, Family/sitter at bedside, Familiar objects from home, Gait belt with transfers/ambulation, Update white board, Toileting rounds, Room close to nurse's station, Reorient patient, More frequent rounding, Increase mobility, HELP ([address]) if available    Medication Interventions: Assess postural VS orthostatic hypotension, Bed/chair exit alarm, Evaluate medications/consider consulting pharmacy, Patient to call before getting OOB, Teach patient to arise slowly, Utilize gait belt for transfers/ambulation    Elimination Interventions: Bed/chair exit alarm, Call light in reach, Elevated toilet seat, Patient to call for help with toileting needs, Stay With Me (per policy), Toilet paper/wipes in reach, Toileting schedule/hourly rounds              Problem: Pressure Injury - Risk of  Goal: *Prevention of pressure injury  Description: Document Madhu Scale and appropriate interventions in the flowsheet.   Outcome: Progressing Towards Goal  Note: Pressure Injury Interventions:  Sensory Interventions: Assess changes in LOC, Assess need for specialty bed, Chair cushion, Avoid rigorous massage over bony prominences, Check visual cues for pain, Discuss PT/OT consult with provider, Float heels, Keep linens dry and wrinkle-free, Maintain/enhance activity level, Minimize linen layers, Use 30-degree side-lying position, Monitor skin under medical devices, Pad between skin to skin, Pressure redistribution bed/mattress (bed type)    Moisture Interventions: Absorbent underpads, Apply protective barrier, creams and emollients, Assess need for specialty bed, Check for incontinence Q2 hours and as needed, Contain wound drainage, Internal/External urinary devices, Limit adult briefs, Maintain skin hydration (lotion/cream), Minimize layers, Moisture barrier, Offer toileting Q_hr    Activity Interventions: Assess need for specialty bed, Chair cushion, Increase time out of bed, Pressure redistribution bed/mattress(bed type), PT/OT evaluation    Mobility Interventions: Assess need for specialty bed, Chair cushion, Float heels, HOB 30 degrees or less, Pressure redistribution bed/mattress (bed type), PT/OT evaluation    Nutrition Interventions: Document food/fluid/supplement intake, Discuss nutritional consult with provider, Offer support with meals,snacks and hydration    Friction and Shear Interventions: Apply protective barrier, creams and emollients, Feet elevated on foot rest, HOB 30 degrees or less, Foam dressings/transparent film/skin sealants, Lift sheet, Lift team/patient mobility team, Minimize layers                Problem: Patient Education: Go to Patient Education Activity  Goal: Patient/Family Education  Outcome: Progressing Towards Goal     Problem: Patient Education: Go to Patient Education Activity  Goal: Patient/Family Education  Outcome: Progressing Towards Goal

## 2020-11-09 LAB
ANION GAP SERPL CALC-SCNC: 5 MMOL/L (ref 5–15)
BUN SERPL-MCNC: 14 MG/DL (ref 6–20)
BUN/CREAT SERPL: 23 (ref 12–20)
CALCIUM SERPL-MCNC: 8.2 MG/DL (ref 8.5–10.1)
CHLORIDE SERPL-SCNC: 103 MMOL/L (ref 97–108)
CO2 SERPL-SCNC: 28 MMOL/L (ref 21–32)
CREAT SERPL-MCNC: 0.6 MG/DL (ref 0.55–1.02)
GLUCOSE 2H P MEAL SERPL-MCNC: 348 MG/DL (ref 65–140)
GLUCOSE BLD STRIP.AUTO-MCNC: 262 MG/DL (ref 65–100)
GLUCOSE BLD STRIP.AUTO-MCNC: 270 MG/DL (ref 65–100)
GLUCOSE BLD STRIP.AUTO-MCNC: 340 MG/DL (ref 65–100)
GLUCOSE BLD STRIP.AUTO-MCNC: 394 MG/DL (ref 65–100)
GLUCOSE BLD STRIP.AUTO-MCNC: 445 MG/DL (ref 65–100)
GLUCOSE BLD STRIP.AUTO-MCNC: 60 MG/DL (ref 65–100)
GLUCOSE BLD STRIP.AUTO-MCNC: 66 MG/DL (ref 65–100)
GLUCOSE BLD STRIP.AUTO-MCNC: 93 MG/DL (ref 65–100)
GLUCOSE SERPL-MCNC: 280 MG/DL (ref 65–100)
POTASSIUM SERPL-SCNC: 3.9 MMOL/L (ref 3.5–5.1)
SERVICE CMNT-IMP: ABNORMAL
SERVICE CMNT-IMP: NORMAL
SERVICE CMNT-IMP: NORMAL
SODIUM SERPL-SCNC: 136 MMOL/L (ref 136–145)

## 2020-11-09 PROCEDURE — 74011250637 HC RX REV CODE- 250/637: Performed by: INTERNAL MEDICINE

## 2020-11-09 PROCEDURE — 74011636637 HC RX REV CODE- 636/637: Performed by: INTERNAL MEDICINE

## 2020-11-09 PROCEDURE — 80048 BASIC METABOLIC PNL TOTAL CA: CPT

## 2020-11-09 PROCEDURE — 99231 SBSQ HOSP IP/OBS SF/LOW 25: CPT | Performed by: INTERNAL MEDICINE

## 2020-11-09 PROCEDURE — 82947 ASSAY GLUCOSE BLOOD QUANT: CPT

## 2020-11-09 PROCEDURE — 74011250636 HC RX REV CODE- 250/636: Performed by: INTERNAL MEDICINE

## 2020-11-09 PROCEDURE — 74011250637 HC RX REV CODE- 250/637: Performed by: NURSE PRACTITIONER

## 2020-11-09 PROCEDURE — 77030038269 HC DRN EXT URIN PURWCK BARD -A

## 2020-11-09 PROCEDURE — 65660000000 HC RM CCU STEPDOWN

## 2020-11-09 PROCEDURE — 99232 SBSQ HOSP IP/OBS MODERATE 35: CPT | Performed by: INTERNAL MEDICINE

## 2020-11-09 PROCEDURE — 36415 COLL VENOUS BLD VENIPUNCTURE: CPT

## 2020-11-09 PROCEDURE — 82962 GLUCOSE BLOOD TEST: CPT

## 2020-11-09 RX ORDER — INSULIN LISPRO 100 [IU]/ML
8 INJECTION, SOLUTION INTRAVENOUS; SUBCUTANEOUS
Status: COMPLETED | OUTPATIENT
Start: 2020-11-09 | End: 2020-11-09

## 2020-11-09 RX ORDER — INSULIN GLARGINE 100 [IU]/ML
6 INJECTION, SOLUTION SUBCUTANEOUS
Status: COMPLETED | OUTPATIENT
Start: 2020-11-09 | End: 2020-11-09

## 2020-11-09 RX ORDER — INSULIN LISPRO 100 [IU]/ML
4 INJECTION, SOLUTION INTRAVENOUS; SUBCUTANEOUS ONCE
Status: COMPLETED | OUTPATIENT
Start: 2020-11-09 | End: 2020-11-09

## 2020-11-09 RX ORDER — INSULIN DEGLUDEC INJECTION 100 U/ML
16 INJECTION, SOLUTION SUBCUTANEOUS DAILY
Qty: 2 ADJUSTABLE DOSE PRE-FILLED PEN SYRINGE | Refills: 11 | Status: SHIPPED | OUTPATIENT
Start: 2020-11-09 | End: 2020-11-09 | Stop reason: SDUPTHER

## 2020-11-09 RX ORDER — INSULIN LISPRO 100 [IU]/ML
INJECTION, SOLUTION INTRAVENOUS; SUBCUTANEOUS
Qty: 2 ADJUSTABLE DOSE PRE-FILLED PEN SYRINGE | Refills: 11 | Status: SHIPPED | OUTPATIENT
Start: 2020-11-09 | End: 2020-11-10 | Stop reason: SDUPTHER

## 2020-11-09 RX ORDER — INSULIN DEGLUDEC INJECTION 100 U/ML
18 INJECTION, SOLUTION SUBCUTANEOUS DAILY
Qty: 2 ADJUSTABLE DOSE PRE-FILLED PEN SYRINGE | Refills: 11 | Status: SHIPPED | OUTPATIENT
Start: 2020-11-09 | End: 2020-11-12 | Stop reason: SDUPTHER

## 2020-11-09 RX ORDER — INSULIN GLARGINE 100 [IU]/ML
14 INJECTION, SOLUTION SUBCUTANEOUS DAILY
Status: DISCONTINUED | OUTPATIENT
Start: 2020-11-09 | End: 2020-11-11

## 2020-11-09 RX ORDER — METOPROLOL SUCCINATE 25 MG/1
25 TABLET, EXTENDED RELEASE ORAL DAILY
Status: DISCONTINUED | OUTPATIENT
Start: 2020-11-09 | End: 2020-11-12 | Stop reason: HOSPADM

## 2020-11-09 RX ORDER — INSULIN LISPRO 100 [IU]/ML
INJECTION, SOLUTION INTRAVENOUS; SUBCUTANEOUS
Qty: 2 ADJUSTABLE DOSE PRE-FILLED PEN SYRINGE | Refills: 11 | Status: SHIPPED | OUTPATIENT
Start: 2020-11-09 | End: 2020-11-09 | Stop reason: SDUPTHER

## 2020-11-09 RX ADMIN — INSULIN LISPRO 4 UNITS: 100 INJECTION, SOLUTION INTRAVENOUS; SUBCUTANEOUS at 18:31

## 2020-11-09 RX ADMIN — LEVOTHYROXINE SODIUM 175 MCG: 0.03 TABLET ORAL at 06:41

## 2020-11-09 RX ADMIN — CLOPIDOGREL BISULFATE 75 MG: 75 TABLET ORAL at 08:44

## 2020-11-09 RX ADMIN — METOPROLOL SUCCINATE 25 MG: 25 TABLET, EXTENDED RELEASE ORAL at 08:44

## 2020-11-09 RX ADMIN — Medication 1 CAPSULE: at 08:44

## 2020-11-09 RX ADMIN — Medication 10 ML: at 21:40

## 2020-11-09 RX ADMIN — VANCOMYCIN HYDROCHLORIDE 1000 MG: 1 INJECTION, POWDER, LYOPHILIZED, FOR SOLUTION INTRAVENOUS at 03:34

## 2020-11-09 RX ADMIN — PRAVASTATIN SODIUM 80 MG: 40 TABLET ORAL at 08:44

## 2020-11-09 RX ADMIN — ACETAMINOPHEN 650 MG: 325 TABLET ORAL at 21:40

## 2020-11-09 RX ADMIN — INSULIN LISPRO 3 UNITS: 100 INJECTION, SOLUTION INTRAVENOUS; SUBCUTANEOUS at 08:44

## 2020-11-09 RX ADMIN — INSULIN GLARGINE 6 UNITS: 100 INJECTION, SOLUTION SUBCUTANEOUS at 12:37

## 2020-11-09 RX ADMIN — AMLODIPINE BESYLATE 5 MG: 5 TABLET ORAL at 08:44

## 2020-11-09 RX ADMIN — ENOXAPARIN SODIUM 40 MG: 40 INJECTION SUBCUTANEOUS at 08:44

## 2020-11-09 RX ADMIN — INSULIN GLARGINE 14 UNITS: 100 INJECTION, SOLUTION SUBCUTANEOUS at 08:45

## 2020-11-09 RX ADMIN — INSULIN LISPRO 8 UNITS: 100 INJECTION, SOLUTION INTRAVENOUS; SUBCUTANEOUS at 12:37

## 2020-11-09 RX ADMIN — Medication 10 ML: at 06:43

## 2020-11-09 NOTE — PROGRESS NOTES
Bedside and Verbal shift change report given to [facility] (oncoming nurse) by Student Nurse iLlly Ordonez (offgoing nurse). Report included the following information SBAR, Kardex, Procedure Summary, Intake/Output, MAR, Recent Results and Med Rec Status.

## 2020-11-09 NOTE — PROGRESS NOTES
[address] [place] [address]  [phone]      Cardiology Progress Note      11/9/2020 8:18 AM    Admit Date: 10/31/2020    Admit Diagnosis:   DKA (diabetic ketoacidoses) ([facility] [E11.10]  Se[facility] ([facility] [A41.9, R65.21]  Severe dehydration [E86.0]    Subjective:     Deana Mckoy is pleasant this am, sitting up in bed eating breakfast. No cardiac complaints. VSS.   Labs steady    Visit Vitals  /74 (BP 1 Location: Right arm, BP Patient Position: At rest)   Pulse 72   Temp 98.1 °F (36.7 °C)   Resp 16   Ht 5' 3\" (1.6 m)   Wt 60.3 kg (133 lb)   SpO2 98%   BMI 23.56 kg/m²       Current Facility-Administered Medications   Medication Dose Route Frequency    insulin glargine (LANTUS) injection 14 Units  14 Units SubCUTAneous DAILY    insulin lispro (HUMALOG) injection 3 Units  3 Units SubCUTAneous ACB    insulin lispro (HUMALOG) injection 2 Units  2 Units SubCUTAneous ACL    amLODIPine (NORVASC) tablet 5 mg  5 mg Oral DAILY    lactobac ac& pc-s.therm-b.anim (GALILEA Q/RISAQUAD)  1 Cap Oral DAILY    glucose chewable tablet 16 g  4 Tab Oral PRN    dextrose (D50W) injection syrg 12.5-25 g  12.5-25 g IntraVENous PRN    glucagon (GLUCAGEN) injection 1 mg  1 mg IntraMUSCular PRN    clopidogreL (PLAVIX) tablet 75 mg  75 mg Oral DAILY    levothyroxine (SYNTHROID) tablet 175 mcg  175 mcg Oral 6am    pravastatin (PRAVACHOL) tablet 80 mg  80 mg Oral DAILY    sodium chloride (NS) flush 5-40 mL  5-40 mL IntraVENous Q8H    sodium chloride (NS) flush 5-40 mL  5-40 mL IntraVENous PRN    acetaminophen (TYLENOL) tablet 650 mg  650 mg Oral Q6H PRN    Or    acetaminophen (TYLENOL) suppository 650 mg  650 mg Rectal Q6H PRN    polyethylene glycol (MIRALAX) packet 17 g  17 g Oral DAILY PRN    promethazine (PHENERGAN) tablet 12.5 mg  12.5 mg Oral Q6H PRN    Or    ondansetron (ZOFRAN) injection 4 mg  4 mg IntraVENous Q6H PRN    enoxaparin (LOVENOX) injection 40 mg  40 mg SubCUTAneous DAILY Objective:      Physical Exam:  General: pleasant, elderly AA female in NAD   Heart: RRR, no m/S3/JVD, no carotid bruits   Lungs: clear throughout   Abdomen: Soft, +BS, NTND   Extremities: LE peter +DP/PT,trace pitting bilateral ankle edema   Neurologic: Alert, oriented to person and place not situation and time. Data Review:   Recent Labs     11/08/20  0256   WBC 3.3*   HGB 8.4*   HCT 25.2*        Recent Labs     11/09/20  0446 11/08/20  0256 11/06/20  1353    139  --    K 3.9 3.9 4.1    106  --    CO2 28 28  --    * 167*  --    BUN 14 12  --    CREA 0.60 0.50*  --    CA 8.2* 8.0*  --    MG  --  1.8 1.7   PHOS  --  3.3  --        No results for input(s): TROIQ, CPK, CKMB in the last 72 hours. Intake/Output Summary (Last 24 hours) at 11/9/2020 0818  Last data filed at 11/9/2020 0708  Gross per 24 hour   Intake 750 ml   Output 2700 ml   Net -1950 ml        Cardiographics     Telemetry: SR 70's, no ectpoic beats noted  ECG: NSR with SA  Echocardiogram:  EF 60-65%, left pleural effusion, mild hypertrophy   CXRAY: \"Stable small left pleural effusion  With probable right basilar subsegmental atelectasis but early infiltrate cannot be excluded\"       Assessment:     Active Problems:    DKA (diabetic ketoacidoses) ([facility] (6/2/2017)      Severe dehydration (10/31/2020)      Septic shock ([facility] (10/31/2020)        Plan:   Alan Dominguez is a 67 y.o. female with a PMH significant for DM II, HTN, CVA, Dementia admitted for DKA (diabetic ketoacidoses) ([facility] [E11.10] Septic shock ([facility] [A41.9, R65.21] Severe dehydration [E86.0].       Vtach: 8 beats, non-sustained-no further episodes seen on tele review  -Cardiac w/u 2019, nuclear stress negative, repeat ECHO WNL  -Keep K at 4, Mg at 2   -Continue to monitor tele.      History of CVA  History of dementia  Continue Plavix  -Continue statin      Hypertension  BP controlled, restart home medications with hold parameters.   -Norvasc 5 mg PO daily -Restart BB this am, HR as high as 104 on chart review over weekend.      History of thyroidectomy  Chronically on Synthroid, continue, manage per attending  -TSH level 62.10      Megan Toledo NP   MSN,RN,AG-ACNP-[facility] Cardiology    11/9/2020         Patient seen, examined by me personally. Plan discussed as detailed. Agree with note as outlined by  NP. I confirm findings in history and physical exam. No additional findings noted. Agree with plan as outlined above. No recurrent VT. Continue beta blocker. F/u in 2-3 weeks.      Madhu Perez MD

## 2020-11-09 NOTE — DISCHARGE INSTRUCTIONS
General Discharge Instructions    Patient ID:  Pura Reyes  [id number]  67 y.o.  1940    Patient Instructions          The following personal items were collected during your admission and were returned to you. Take Home Medications             What to do at Home    Recommended diet: Diabetic Diet with bedtime snack    Recommended activity: Activity as tolerated    Follow-up with Tyler Soliman MD  in 7 days. Information obtained by :  I understand that if any problems occur once I am at home I am to contact my physician. I understand and acknowledge receipt of the instructions indicated above.                                                                                                                                            Physician's or R.N.'s Signature                                                                  Date/Time                                                                                                                                              Patient or Representative Signature                                                          Date/Time

## 2020-11-09 NOTE — PROGRESS NOTES
Problem: Falls - Risk of  Goal: *Absence of Falls  Description: Document Chantale Cruz Fall Risk and appropriate interventions in the flowsheet.   Outcome: Progressing Towards Goal  Note: Fall Risk Interventions:  Mobility Interventions: Bed/chair exit alarm, Communicate number of staff needed for ambulation/transfer, Patient to call before getting OOB, PT Consult for mobility concerns, Utilize walker, cane, or other assistive device, Utilize gait belt for transfers/ambulation    Mentation Interventions: Adequate sleep, hydration, pain control, Bed/chair exit alarm, Door open when patient unattended, Gait belt with transfers/ambulation, More frequent rounding, Reorient patient, Room close to nurse's station, Update white board    Medication Interventions: Assess postural VS orthostatic hypotension, Patient to call before getting OOB, Teach patient to arise slowly, Utilize gait belt for transfers/ambulation    Elimination Interventions: Bed/chair exit alarm, Call light in reach

## 2020-11-09 NOTE — PROGRESS NOTES
Infectious Disease Progress          IMPRESSION:   · Coagulase-negative staph bacteremia (Ox R), blood culture+ 10/31-2/4 different colony type in 2nd of 4 bottles ?contaminant. In view of initial clinical presentation, also multiple recent hospitalizations, completed vancomycin IV for total of 7 days 11/8. · Repeat blood cultures11/2- NG.  · TTE negative for vegetation  · Shock septic vs hypovolemic, s/p fluids and pressors  · DKA resolved  · S/p ONEYDA   · Multiple admissions for poorly controlled blood sugars/DKA  · Dementia  · History of CVA  · History of thyroidectomy on Synthroid       PLAN:      · Pt may be discharged from ID standpoint  · Patient encouraged to drink more fluids  · Adequate blood sugar control. Patient seen today. No complaints . Completed Vancomycin IV x 7 days on 11/08  No fever, chills. No skin breakdown. The patient is a 67years old female  with past medical history of diabetes mellitus, hypertension, CVA, dementia who presented to emergency department due to elevated blood sugar around 1200. Per Ed note,on  arrival to the ER, patient was found to be in DKA & was started on DKA protocol. Her blood pressure had  in the ED for which she was given IV fluid bolus 30 cc/kg with no response, then she was started on IV vasopressors . Triple-lumen catheter in the right IJ was placed by the ED physician. As per history her daughter Lex Us patient had thought pt forgot to take her insulin and her blood sugar had been  elevated for today prior to the hospital.  Patient had blood cultures drawn on 10/31, now positive for gram-positive cocci 2/4. Urine cultureNG <1000, UA -unremarkable except yeast, few epithelial cells, WBC 10-20. Patient seen today in ICU. She is off pressors.   Awake and oriented x3  Of note patient has had multiple admissions recently for unstable blood sugars, DKA last admission 10/20-10/25, before that 9/16-9/21, 9/01-9/04  Patient Active Problem List   Diagnosis Code    Dyslipidemia E78.5    Hypertension I10    Hypothyroidism E03.9    Memory deficit R41.3    DKA (diabetic ketoacidoses) ([facility] E11.10    Vascular dementia without behavioral disturbance ([facility]) F01.50    H/O: CVA (cerebrovascular accident) Z80.78    Acute metabolic encephalopathy M94.44    Anemia D64.9    Venous insufficiency I87.2    Hyperglycemia due to type 1 diabetes mellitus ([facility] E10.65    Hypoglycemia E16.2    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 ([facility]) E10.649    Dehydration E86.0    Diabetic keto-acidosis ([facility]) E11.10    Lactic acid acidosis E87.2    ONEYDA (acute kidney injury) ([facility] N17.9    Hyperkalemia E87.5    DKA, type 1 ([facility]) E10.10    Severe sepsis ([facility]) A41.9, R65.20    Candidiasis B37.9    Severe dehydration E86.0    Septic shock ([facility]) A41.9, R65.21     Past Medical History:   Diagnosis Date    Diabetes ([facility]     Heart failure ([facility]     unknown to family    Hypertension     Stroke ([facility]       Family History   Problem Relation Age of Onset    Diabetes Father     No Known Problems Mother       Social History     Tobacco Use    Smoking status: Never Smoker    Smokeless tobacco: Never Used   Substance Use Topics    Alcohol use: No     Comment: been years     Past Surgical History:   Procedure Laterality Date    HX GYN      HX HEENT      thyroidectomy    HX HYSTERECTOMY      REMOVAL GALLBLADDER      THYROIDECTOMY        Prior to Admission medications    Medication Sig Start Date End Date Taking? Authorizing Provider   insulin degludec Randy Pile FlexTouch U-100) 100 unit/mL (3 mL) inpn 16 Units by SubCUTAneous route daily. 11/9/20  Yes Carmita Combs MD   insulin lispro (HUMALOG) 100 unit/mL kwikpen 2 units before meals specifically breakfast lunch and dinner 11/9/20  Yes Carmita Combs MD   L. acidoph & paracasei- S therm- Bifido (GALILEA-Q/RISAQUAD) 8 billion cell cap cap Take 1 Cap by mouth daily.  11/9/20  Yes Carmita Combs MD   amLODIPine (NORVASC) 10 mg tablet Take 1 Tab by mouth daily. 10/5/20  Yes Loc Edwards MD   losartan-hydroCHLOROthiazide [facility]) 100-25 mg per tablet Take 1 Tab by mouth daily. Yes Provider, Historical   metoprolol succinate (TOPROL-XL) 25 mg XL tablet TAKE 1 TABLET BY MOUTH EVERY DAY 20  Yes Loc Edwards MD   pravastatin (PRAVACHOL) 80 mg tablet TAKE 1 TABLET BY MOUTH at bedtime 20  Yes Loc Edwards MD   levothyroxine (SYNTHROID) 175 mcg tablet TAKE 1 TABLET BY MOUTH EVERY DAY 20  Yes Loc Edwards MD   clopidogreL (PLAVIX) 75 mg tab TAKE 1 TABLET BY MOUTH EVERY DAY 20  Yes Loc Edwards MD   brimonidine (ALPHAGAN) 0.15 % ophthalmic solution Administer 1 Drop to both eyes two (2) times a day. 1/30/15  Yes Provider, Historical   zinc oxide 20 % ointment Apply 1 Applicator to affected area two (2) times a day. thin layer gluteal cleft    Provider, Historical     No Known Allergies     Review of Systems:  A comprehensive review of systems was negative except for that written in the History of Present Illness. 10 point review of systems obtained . All other systems negative    Objective:   Blood pressure (!) 158/71, pulse 80, temperature 98.5 °F (36.9 °C), resp. rate 18, height 5' 3\" (1.6 m), weight 133 lb (60.3 kg), SpO2 100 %.   Temp (24hrs), Av.4 °F (36.9 °C), Min:98.1 °F (36.7 °C), Max:98.7 °F (37.1 °C)    Current Facility-Administered Medications   Medication Dose Route Frequency    insulin glargine (LANTUS) injection 14 Units  14 Units SubCUTAneous DAILY    metoprolol succinate (TOPROL-XL) XL tablet 25 mg  25 mg Oral DAILY    insulin lispro (HUMALOG) injection 3 Units  3 Units SubCUTAneous ACB    insulin lispro (HUMALOG) injection 2 Units  2 Units SubCUTAneous ACL    amLODIPine (NORVASC) tablet 5 mg  5 mg Oral DAILY    lactobac ac& pc-s.therm-b.anim (GALILEA Q/RISAQUAD)  1 Cap Oral DAILY    glucose chewable tablet 16 g  4 Tab Oral PRN    dextrose (D50W) injection syrg 12.5-25 g  12.5-25 g IntraVENous PRN    glucagon (GLUCAGEN) injection 1 mg  1 mg IntraMUSCular PRN    clopidogreL (PLAVIX) tablet 75 mg  75 mg Oral DAILY    levothyroxine (SYNTHROID) tablet 175 mcg  175 mcg Oral 6am    pravastatin (PRAVACHOL) tablet 80 mg  80 mg Oral DAILY    sodium chloride (NS) flush 5-40 mL  5-40 mL IntraVENous Q8H    sodium chloride (NS) flush 5-40 mL  5-40 mL IntraVENous PRN    acetaminophen (TYLENOL) tablet 650 mg  650 mg Oral Q6H PRN    Or    acetaminophen (TYLENOL) suppository 650 mg  650 mg Rectal Q6H PRN    polyethylene glycol (MIRALAX) packet 17 g  17 g Oral DAILY PRN    promethazine (PHENERGAN) tablet 12.5 mg  12.5 mg Oral Q6H PRN    Or    ondansetron (ZOFRAN) injection 4 mg  4 mg IntraVENous Q6H PRN    enoxaparin (LOVENOX) injection 40 mg  40 mg SubCUTAneous DAILY        Exam:    General:  Awake, cooperative,   Eyes:  Sclera anicteric. Pupils equally round and reactive to light. Mouth/Throat: Mucous membranes normal, oral pharynx clear   Neck: Supple   Lungs:   reduced auscultation bases   CV:  Regular rate and rhythm,no murmur, click, rub or gallop   Abdomen:   Soft, non-tender.  bowel sounds normal. non-distended   Extremities: No  edema   Skin: Skin color, texture, turgor normal. no skin breakdown   Lymph nodes: Cervical and supraclavicular normal   Musculoskeletal: No swelling or deformity   Lines/Devices:  Intact, no erythema, drainage or tenderness   Psych: Awake and oriented, normal mood affect       Data Reviewed:     Lab Results   Component Value Date/Time    Culture result: NO GROWTH 5 DAYS 11/02/2020 10:47 AM    Culture result: No growth (<1,000 CFU/ML) 10/31/2020 11:16 PM    Culture result: (A) 10/31/2020 08:10 PM     STAPHYLOCOCCUS EPIDERMIDIS (OXACILLIN RESISTANT) GROWING IN 1 OF 4 BOTTLES DRAWN (NO SITE INDICATED)    Culture result: (A) 10/31/2020 08:10 PM     STAPHYLOCOCCUS EPIDERMIDIS (OXACILLIN RESISTANT) (2ND COLONY TYPE/STRAIN) GROWING IN THE 2ND OF 4 BOTTLES DRAWN (NO SITE INDICATED)    Culture result: REMAINING BOTTLE(S) HAS/HAVE NO GROWTH IN 5 DAYS 10/31/2020 08:10 PM          XR Results (most recent):  Results from Hospital Encounter encounter on 10/31/20   XR CHEST PORT    Narrative EXAM: XR CHEST PORT    INDICATION: Left pleural effusion    COMPARISON: 10/31/2020    FINDINGS: A portable AP radiograph of the chest was obtained at 0851 hours. The  patient is on a cardiac monitor. The right IJ line has been removed. A complex  linear airspace process is present at the right lung base. A small left pleural  effusion persists. Inferior to the right scapula is a subcentimeter  calcification that may be soft tissue or vascular in nature. Impression IMPRESSION:     Stable small left pleural effusion  With probable right basilar subsegmental atelectasis but early infiltrate cannot  be excluded               ICD-10-CM ICD-9-CM    1. Diabetic ketoacidosis with coma associated with type 1 diabetes mellitus ([facility])  E10.11 250.33    2. Acute kidney injury ([facility]  N17.9 584.9    3. Hypotension, unspecified hypotension type  I95.9 458.9    4. Metabolic acidosis  N13.3 729.5    5. Diabetic ketoacidosis without coma associated with type 2 diabetes mellitus ([facility])  E11.10 250.12    6. Gram-positive cocci bacteremia  R78.81 790.7      041.89    7. Sepsis with acute renal failure and renal cortical necrosis, due to unspecified organism, unspecified whether septic shock present ([facility]  A41.9 038.9     R65.20 995.91     N17.1 584.6    8. Diabetic ketoacidosis without coma associated with type 1 diabetes mellitus ([facility])  E10.10 250.13    9. ONEYDA (acute kidney injury) [facility]  N17.9 584.9    10. Acute metabolic encephalopathy  P97.21 348.31    11. Dehydration  E86.0 276.51    12. Coagulase negative Staphylococcus bacteremia  R78.81 790.7     B95.7 041.19    13. Hypothyroidism due to medication  E03.2 244.8      E980.5    14. Septic shock ([facility])  A41.9 038.9     R65.21 785.52      995.92    15. Severe dehydration  E86.0 276.51    16. Bacteremia due to methicillin resistant Staphylococcus epidermidis  R78.81 790.7     B95.7 041.19     Z16.29     17. NSVT (nonsustained ventricular tachycardia) ([facility])  I47.2 427.1            Antibiotic History  Vancomycin ,s/p  Zosyn IV  I have discussed the diagnosis with the patient and the intended plan as seen in the above orders. I have discussed medication side effects and warnings with the patient as well.     Reviewed test results at length with patient    Signed By: Kitty Jones MD FACP    November 9, 2020

## 2020-11-09 NOTE — PROGRESS NOTES
ALEKSANDAR  1) daughter's home with home health ([address] [place] [address]) and follow up appointments  2) son to provide transportation at d/c    12:09pm  [facility] accepted. AVS updated. 11:45am  CM contacted patient's daughter, Kenneth Corral, again who provided verbal consent for 2nd IM letter. Per Kenneth Corral her brother just got off of work and has to put patient's bed together at daughterNidhi's home then will be able to pick pt up. Medicare pt has received, reviewed, and signed 2nd IM letter informing them of their right to appeal the discharge. Signed copy has been placed on pt bedside chart. 9:45am  [facility] unable to accept patient back. CM informed dtr who had no preference on [facility] agency. Referral sent to [facility] and [facility]. CM requested for someone to pick patient up as soon as possible. Per dtr she will reach out to her brother who will be picking patient up.     9:30am  CM informed by [address] that patient's [facility] services ended a few days ago and will need to be re-approved. Referral has been sent. 9:10am  CM spoke with patient's daughter, Michael Spear ([phone]), regarding d/c. Per dtr pt will have medicaid caregivers through [facility] and was approved for 5 days/week. Dtr reported that patient will be moving in with her at address [place] [id number]. CM inquired about [facility] services and acknowledged pt is open with bs. Dtr requested services be resumed through [facility]. CM has entered new resumption of care order and notified [facility] of d/c today. CM requested CM specialist to schedule PCP appointment, which has been completed and AVS updated.      Lindsey Broderick, [address], [address]

## 2020-11-09 NOTE — PROGRESS NOTES
Problem: Falls - Risk of  Goal: *Absence of Falls  Description: Document Krissy Davalos Fall Risk and appropriate interventions in the flowsheet. Outcome: Progressing Towards Goal  Note: Fall Risk Interventions:  Mobility Interventions: Bed/chair exit alarm, Communicate number of staff needed for ambulation/transfer    Mentation Interventions: Adequate sleep, hydration, pain control, Bed/chair exit alarm, Door open when patient unattended    Medication Interventions: Evaluate medications/consider consulting pharmacy, Bed/chair exit alarm, Teach patient to arise slowly, Patient to call before getting OOB    Elimination Interventions: Bed/chair exit alarm, Call light in reach              Problem: Patient Education: Go to Patient Education Activity  Goal: Patient/Family Education  Outcome: Progressing Towards Goal     Problem: Pressure Injury - Risk of  Goal: *Prevention of pressure injury  Description: Document Madhu Scale and appropriate interventions in the flowsheet.   Outcome: Progressing Towards Goal  Note: Pressure Injury Interventions:  Sensory Interventions: Assess changes in LOC, Float heels, Keep linens dry and wrinkle-free, Minimize linen layers    Moisture Interventions: Absorbent underpads, Apply protective barrier, creams and emollients    Activity Interventions: Increase time out of bed, Pressure redistribution bed/mattress(bed type)    Mobility Interventions: Assess need for specialty bed, Float heels, HOB 30 degrees or less, Pressure redistribution bed/mattress (bed type)    Nutrition Interventions: Document food/fluid/supplement intake    Friction and Shear Interventions: Apply protective barrier, creams and emollients, Lift sheet, HOB 30 degrees or less                Problem: Patient Education: Go to Patient Education Activity  Goal: Patient/Family Education  Outcome: Progressing Towards Goal

## 2020-11-10 LAB
GLUCOSE BLD STRIP.AUTO-MCNC: 202 MG/DL (ref 65–100)
GLUCOSE BLD STRIP.AUTO-MCNC: 269 MG/DL (ref 65–100)
GLUCOSE BLD STRIP.AUTO-MCNC: 300 MG/DL (ref 65–100)
GLUCOSE BLD STRIP.AUTO-MCNC: 321 MG/DL (ref 65–100)
GLUCOSE BLD STRIP.AUTO-MCNC: 383 MG/DL (ref 65–100)
GLUCOSE BLD STRIP.AUTO-MCNC: 54 MG/DL (ref 65–100)
GLUCOSE BLD STRIP.AUTO-MCNC: 59 MG/DL (ref 65–100)
GLUCOSE BLD STRIP.AUTO-MCNC: 69 MG/DL (ref 65–100)
SERVICE CMNT-IMP: ABNORMAL
SERVICE CMNT-IMP: NORMAL

## 2020-11-10 PROCEDURE — 74011000250 HC RX REV CODE- 250: Performed by: INTERNAL MEDICINE

## 2020-11-10 PROCEDURE — 74011636637 HC RX REV CODE- 636/637: Performed by: INTERNAL MEDICINE

## 2020-11-10 PROCEDURE — 82962 GLUCOSE BLOOD TEST: CPT

## 2020-11-10 PROCEDURE — 74011250637 HC RX REV CODE- 250/637: Performed by: INTERNAL MEDICINE

## 2020-11-10 PROCEDURE — 74011250636 HC RX REV CODE- 250/636: Performed by: INTERNAL MEDICINE

## 2020-11-10 PROCEDURE — 65660000000 HC RM CCU STEPDOWN

## 2020-11-10 PROCEDURE — 74011250637 HC RX REV CODE- 250/637: Performed by: NURSE PRACTITIONER

## 2020-11-10 RX ORDER — INSULIN LISPRO 100 [IU]/ML
2 INJECTION, SOLUTION INTRAVENOUS; SUBCUTANEOUS
Status: DISCONTINUED | OUTPATIENT
Start: 2020-11-11 | End: 2020-11-12 | Stop reason: HOSPADM

## 2020-11-10 RX ORDER — INSULIN LISPRO 100 [IU]/ML
3 INJECTION, SOLUTION INTRAVENOUS; SUBCUTANEOUS ONCE
Status: COMPLETED | OUTPATIENT
Start: 2020-11-10 | End: 2020-11-10

## 2020-11-10 RX ORDER — AMLODIPINE BESYLATE 5 MG/1
10 TABLET ORAL DAILY
Status: DISCONTINUED | OUTPATIENT
Start: 2020-11-11 | End: 2020-11-12 | Stop reason: HOSPADM

## 2020-11-10 RX ORDER — INSULIN LISPRO 100 [IU]/ML
3 INJECTION, SOLUTION INTRAVENOUS; SUBCUTANEOUS
Status: DISCONTINUED | OUTPATIENT
Start: 2020-11-11 | End: 2020-11-12 | Stop reason: HOSPADM

## 2020-11-10 RX ORDER — AMLODIPINE BESYLATE 5 MG/1
5 TABLET ORAL ONCE
Status: COMPLETED | OUTPATIENT
Start: 2020-11-10 | End: 2020-11-10

## 2020-11-10 RX ORDER — INSULIN LISPRO 100 [IU]/ML
INJECTION, SOLUTION INTRAVENOUS; SUBCUTANEOUS
Qty: 2 ADJUSTABLE DOSE PRE-FILLED PEN SYRINGE | Refills: 11 | Status: SHIPPED | OUTPATIENT
Start: 2020-11-10 | End: 2020-11-25 | Stop reason: SDUPTHER

## 2020-11-10 RX ADMIN — DEXTROSE MONOHYDRATE 25 G: 25 INJECTION, SOLUTION INTRAVENOUS at 08:25

## 2020-11-10 RX ADMIN — Medication 10 ML: at 13:19

## 2020-11-10 RX ADMIN — Medication 1 CAPSULE: at 08:26

## 2020-11-10 RX ADMIN — INSULIN LISPRO 3 UNITS: 100 INJECTION, SOLUTION INTRAVENOUS; SUBCUTANEOUS at 21:55

## 2020-11-10 RX ADMIN — INSULIN LISPRO 3 UNITS: 100 INJECTION, SOLUTION INTRAVENOUS; SUBCUTANEOUS at 13:18

## 2020-11-10 RX ADMIN — Medication 10 ML: at 06:51

## 2020-11-10 RX ADMIN — ENOXAPARIN SODIUM 40 MG: 40 INJECTION SUBCUTANEOUS at 08:26

## 2020-11-10 RX ADMIN — PRAVASTATIN SODIUM 80 MG: 40 TABLET ORAL at 08:26

## 2020-11-10 RX ADMIN — LEVOTHYROXINE SODIUM 175 MCG: 0.03 TABLET ORAL at 06:48

## 2020-11-10 RX ADMIN — AMLODIPINE BESYLATE 5 MG: 5 TABLET ORAL at 08:26

## 2020-11-10 RX ADMIN — CLOPIDOGREL BISULFATE 75 MG: 75 TABLET ORAL at 08:26

## 2020-11-10 RX ADMIN — METOPROLOL SUCCINATE 25 MG: 25 TABLET, EXTENDED RELEASE ORAL at 08:26

## 2020-11-10 RX ADMIN — AMLODIPINE BESYLATE 5 MG: 5 TABLET ORAL at 09:27

## 2020-11-10 NOTE — PROGRESS NOTES
Per order Dr. Juliana Kerr would like to be notified of the patient's BS at dinner time. Shaila 15 with Dr. Juliana Kerr via telephone to notify him of the patient's BS of 270. He would like to give the patient 4 units of insulin lispro for mealtime coverage. [address] Per Dr. Juliana Kerr, I have put in an order for the 4 units of insulin.

## 2020-11-10 NOTE — PROGRESS NOTES
General Daily Progress Note    Admit Date: 10/31/2020    Subjective:     Patient has no complaint . Mouna Craig Current Facility-Administered Medications   Medication Dose Route Frequency    insulin glargine (LANTUS) injection 14 Units  14 Units SubCUTAneous DAILY    metoprolol succinate (TOPROL-XL) XL tablet 25 mg  25 mg Oral DAILY    influenza vaccine 2020-21 (6 mos+)(PF) (FLUARIX/FLULAVAL/FLUZONE QUAD) injection 0.5 mL  0.5 mL IntraMUSCular PRIOR TO DISCHARGE    amLODIPine (NORVASC) tablet 5 mg  5 mg Oral DAILY    lactobac ac& pc-s.therm-b.anim (GALILEA Q/RISAQUAD)  1 Cap Oral DAILY    glucose chewable tablet 16 g  4 Tab Oral PRN    dextrose (D50W) injection syrg 12.5-25 g  12.5-25 g IntraVENous PRN    glucagon (GLUCAGEN) injection 1 mg  1 mg IntraMUSCular PRN    clopidogreL (PLAVIX) tablet 75 mg  75 mg Oral DAILY    levothyroxine (SYNTHROID) tablet 175 mcg  175 mcg Oral 6am    pravastatin (PRAVACHOL) tablet 80 mg  80 mg Oral DAILY    sodium chloride (NS) flush 5-40 mL  5-40 mL IntraVENous Q8H    sodium chloride (NS) flush 5-40 mL  5-40 mL IntraVENous PRN    acetaminophen (TYLENOL) tablet 650 mg  650 mg Oral Q6H PRN    Or    acetaminophen (TYLENOL) suppository 650 mg  650 mg Rectal Q6H PRN    polyethylene glycol (MIRALAX) packet 17 g  17 g Oral DAILY PRN    promethazine (PHENERGAN) tablet 12.5 mg  12.5 mg Oral Q6H PRN    Or    ondansetron (ZOFRAN) injection 4 mg  4 mg IntraVENous Q6H PRN    enoxaparin (LOVENOX) injection 40 mg  40 mg SubCUTAneous DAILY        Review of Systems  A comprehensive review of systems was negative.     Objective:     Patient Vitals for the past 24 hrs:   BP Temp Pulse Resp SpO2   11/10/20 0747 (!) 176/79 97.1 °F (36.2 °C) 67 18 99 %   11/10/20 0300 (!) 143/66 98.3 °F (36.8 °C) 76 18 100 %   11/10/20 0005 (!) 117/57 98 °F (36.7 °C) 67 16 97 %   11/09/20 1918 138/65 98.4 °F (36.9 °C) 69 16 98 %   11/09/20 1620 (!) 124/53 98.2 °F (36.8 °C) 69 18 98 %   11/09/20 1211 (!) 146/72 98.2 °F (36.8 °C) 68 18 99 %   11/09/20 0841 (!) 158/71 98.5 °F (36.9 °C) 80 18 100 %     No intake/output data recorded. 11/08 1901 - 11/10 0700  In: -   Out: 3800 [Urine:3800]    Physical Exam:   Visit Vitals  BP (!) 176/79   Pulse 67   Temp 97.1 °F (36.2 °C)   Resp 18   Ht 5' 3\" (1.6 m)   Wt 133 lb (60.3 kg)   SpO2 99%   BMI 23.56 kg/m²     General appearance: alert, cooperative, no distress, appears stated age  Neck: supple, symmetrical, trachea midline, no adenopathy, thyroid: not enlarged, symmetric, no tenderness/mass/nodules, no carotid bruit and no JVD  Lungs: clear to auscultation bilaterally  Heart: regular rate and rhythm, S1, S2 normal, no murmur, click, rub or gallop  Abdomen: soft, non-tender. Bowel sounds normal. No masses,  no organomegaly  Extremities: extremities normal, atraumatic, no cyanosis or edema    Assessment:     Active Problems:    DKA (diabetic ketoacidoses) ([facility] [address] (6/2/2017)      Severe dehydration (10/31/2020)      Septic shock ([facility] [address] (10/31/2020)        Plan:     1. Discharge held yesterday because of hyperglycemia which was a bit strange. Blood sugars have improved to the point of actually being somewhat on the low side but this is not surprising given the intensity of intervention yesterday. It appears that significant hyperglycemia occurs while the patient is at home leading to the recurrent admissions for DKA . I will therefore maintain basal insulin dosing along with prandial coverage. She has not had symptomatic hypoglycemia requiring intervention in well over 6+ months. What is done in the hospital as far as her diabetes is concerned has no marcela bearing on what happens when she is discharged. We will attempt discharge today but will avoid prandial insulin until she is home. 2.  No recurrent temperature elevation or suggestion of of an infection. Duke Spear

## 2020-11-10 NOTE — PROGRESS NOTES
PT visit attempted pt reported she didn't t want to get up right now but would get up in an hour. Will defer at this time and contiue to follow. x2 visit attempted pt still not wanting to get up right now. Will defer at this time and continue to follow.

## 2020-11-10 NOTE — PROGRESS NOTES
Bedside and Verbal shift change report given to Court Carlson (oncoming nurse) by Arthur Hidalgo RN (offgoing nurse). Report included the following information SBAR, Kardex, Intake/Output, MAR, Accordion and Med Rec Status.

## 2020-11-11 ENCOUNTER — PATIENT OUTREACH (OUTPATIENT)
Dept: CASE MANAGEMENT | Age: 80
End: 2020-11-11

## 2020-11-11 LAB
GLUCOSE BLD STRIP.AUTO-MCNC: 162 MG/DL (ref 65–100)
GLUCOSE BLD STRIP.AUTO-MCNC: 229 MG/DL (ref 65–100)
GLUCOSE BLD STRIP.AUTO-MCNC: 322 MG/DL (ref 65–100)
GLUCOSE BLD STRIP.AUTO-MCNC: 456 MG/DL (ref 65–100)
GLUCOSE BLD STRIP.AUTO-MCNC: 477 MG/DL (ref 65–100)
SERVICE CMNT-IMP: ABNORMAL

## 2020-11-11 PROCEDURE — 74011636637 HC RX REV CODE- 636/637: Performed by: STUDENT IN AN ORGANIZED HEALTH CARE EDUCATION/TRAINING PROGRAM

## 2020-11-11 PROCEDURE — 74011250637 HC RX REV CODE- 250/637: Performed by: INTERNAL MEDICINE

## 2020-11-11 PROCEDURE — 65660000000 HC RM CCU STEPDOWN

## 2020-11-11 PROCEDURE — 2709999900 HC NON-CHARGEABLE SUPPLY

## 2020-11-11 PROCEDURE — 74011250636 HC RX REV CODE- 250/636: Performed by: INTERNAL MEDICINE

## 2020-11-11 PROCEDURE — 74011636637 HC RX REV CODE- 636/637: Performed by: INTERNAL MEDICINE

## 2020-11-11 PROCEDURE — 74011250637 HC RX REV CODE- 250/637: Performed by: NURSE PRACTITIONER

## 2020-11-11 PROCEDURE — 82962 GLUCOSE BLOOD TEST: CPT

## 2020-11-11 RX ORDER — INSULIN LISPRO 100 [IU]/ML
INJECTION, SOLUTION INTRAVENOUS; SUBCUTANEOUS
Status: DISCONTINUED | OUTPATIENT
Start: 2020-11-11 | End: 2020-11-12 | Stop reason: HOSPADM

## 2020-11-11 RX ORDER — DEXTROSE 50 % IN WATER (D50W) INTRAVENOUS SYRINGE
12.5-25 AS NEEDED
Status: DISCONTINUED | OUTPATIENT
Start: 2020-11-11 | End: 2020-11-12 | Stop reason: SDUPTHER

## 2020-11-11 RX ORDER — HYDRALAZINE HYDROCHLORIDE 20 MG/ML
20 INJECTION INTRAMUSCULAR; INTRAVENOUS
Status: DISCONTINUED | OUTPATIENT
Start: 2020-11-11 | End: 2020-11-12 | Stop reason: HOSPADM

## 2020-11-11 RX ORDER — INSULIN GLARGINE 100 [IU]/ML
16 INJECTION, SOLUTION SUBCUTANEOUS DAILY
Status: DISCONTINUED | OUTPATIENT
Start: 2020-11-11 | End: 2020-11-12 | Stop reason: HOSPADM

## 2020-11-11 RX ORDER — MAGNESIUM SULFATE 100 %
4 CRYSTALS MISCELLANEOUS AS NEEDED
Status: DISCONTINUED | OUTPATIENT
Start: 2020-11-11 | End: 2020-11-12 | Stop reason: HOSPADM

## 2020-11-11 RX ADMIN — AMLODIPINE BESYLATE 10 MG: 5 TABLET ORAL at 10:19

## 2020-11-11 RX ADMIN — PRAVASTATIN SODIUM 80 MG: 40 TABLET ORAL at 10:18

## 2020-11-11 RX ADMIN — INSULIN LISPRO 2 UNITS: 100 INJECTION, SOLUTION INTRAVENOUS; SUBCUTANEOUS at 17:55

## 2020-11-11 RX ADMIN — Medication 1 CAPSULE: at 10:18

## 2020-11-11 RX ADMIN — LEVOTHYROXINE SODIUM 175 MCG: 0.03 TABLET ORAL at 06:45

## 2020-11-11 RX ADMIN — INSULIN LISPRO 3 UNITS: 100 INJECTION, SOLUTION INTRAVENOUS; SUBCUTANEOUS at 17:54

## 2020-11-11 RX ADMIN — Medication 10 ML: at 21:44

## 2020-11-11 RX ADMIN — CLOPIDOGREL BISULFATE 75 MG: 75 TABLET ORAL at 10:18

## 2020-11-11 RX ADMIN — METOPROLOL SUCCINATE 25 MG: 25 TABLET, EXTENDED RELEASE ORAL at 10:18

## 2020-11-11 RX ADMIN — INSULIN LISPRO 3 UNITS: 100 INJECTION, SOLUTION INTRAVENOUS; SUBCUTANEOUS at 10:17

## 2020-11-11 RX ADMIN — ENOXAPARIN SODIUM 40 MG: 40 INJECTION SUBCUTANEOUS at 10:17

## 2020-11-11 RX ADMIN — Medication 10 ML: at 06:45

## 2020-11-11 RX ADMIN — INSULIN GLARGINE 16 UNITS: 100 INJECTION, SOLUTION SUBCUTANEOUS at 10:18

## 2020-11-11 RX ADMIN — INSULIN LISPRO 3 UNITS: 100 INJECTION, SOLUTION INTRAVENOUS; SUBCUTANEOUS at 12:46

## 2020-11-11 RX ADMIN — Medication 10 ML: at 17:55

## 2020-11-11 NOTE — PROGRESS NOTES
Goals Addressed                 This Visit's Progress     Supportive resources: medicaid assistance and options for caregiver support          11/11/2020   SW contacted Nancy with [address] to inquire about the receipt of UAI screening. She stated that she did not receive a complete screening via fax. Daughter reported she had difficulty faxing information. SW contacted CM Hamilton Hammans at [facility] to make a request to have UAI/96 faxed to [address] at Fax: [phone]. Daughter made aware of request.  Nancy Nunes will follow up at a later date. Xu Starks [address] [place] [address] Team   [facility] Ambulatory Care Coordination Team  [phone]     11/6/2020   SW contacted [address] [phone] to inquire about their availability to provide personal care services through [facility]. SW spoke with Dinah Mock and she is willing to accept patient's case for personal care. She requested a copy of pt's Uniform Assessment Instrument screening to initiate process. YOLANDA contacted pt's daughter, Jc Wills to update her on the information that is requested for services. SW provided her with owner's name, fax number and e-mail address to receive UAI screening. Fax number: [phone], e-mail address: [email or web address]. SW will follow up with daughter at a later date for provide further assistance if needed,  Xu Starks, [facility] Team   [facility] Ambulatory Care Coordination Team  [phone]       11/4/2020   YOLANDA contacted [facility] to inquire about status of personal care services. Rep reported that they have not found a care aide that is able to take the case at this time. SW will make another attempt to contact pt's Medicaid Care Coordinator for assistance. 10/29/2020   Follow up   SW contacted daughter to inquire about status of patient's LTC Medicaid coverage.   Daughter stated that patient was approved for LTC Medicaid and she was given a copy of Uniform Assessment Instrument for personal care. Daughter reported that she is having difficulty with identifying a personal care agency that can provide services for pt due to various reasons. Daughter provided SW with a list of agencies that she has contacted. Patient was approved for 40hrs a week for personal care. Plan  SW contacted [facility] [phone] and they are willing to accept the case pending the availability for aides that could render the service in her area. Home Health representative will contact daughter for additional follow up. SW also contacted Blayne Jovel to speak with patient's Complex Care Coordinator. SW left a voicemail with CC [facility] [phone] requesting a call back for assistance with care coordination. SW shared this update with patient's daughter and advised her to follow up with CC next week. SW will follow up at a later date. Kiarra Domingo, [address] [place] [address] Transitions Team   [address] [place] [address] Ambulatory Care Coordination Team  [phone]         10/14/2020   Follow up  SW received a referral from [facility] requesting a follow up call with patient's daughter to assist her with signing patient up for Meals on Wheels program.  CTN contacted [facility] enrolling patient into their home delivered meals program (10 meals) following her recent discharge from hospital.  YOLANDA contacted daughter Ace Vences for follow up. Plan  SW completed the Meals on Wheels application online with daughter's assistance. Advise daughter to contact [employer] after 2 weeks if she has not received approval. Per Ace Vences, she is expecting to receive notification from [address] regarding pt's status for LTC/personal care Medicaid coverage. SW explained to daughter the process for selecting  care agencies and advise her to follow up for assistance if needed.   Daughter understood and voiced no additional questions or concerns during the call. Manda Portillo, [facility] Transitions Team   [facility] Ambulatory Care Coordination Team  [phone]     7/6/20  SW received referral from CTN requesting a follow up call with patient's daughter to offer assistance/support with establishing personal care and assistance for patient. Per CTN, a UAI screening was completed while pt was hospitalized and daughter was working with [facility] program to establish day program/Medicaid. SW left voice mails requesting a call back. SW will follow up once call is returned  Manda Portillo, [facility] Ambulatory Care Coordination Team  [phone]      2/26/2020   CT SW received a call from daughter discussing pt's plan. Daughter stated that she is having difficulty with getting in contact with [place] to initiate UAI screening for personal care. SW reviewed number that was provided and encouraged her to make another outreach attempt. YOLANDA also provided her with pt's [facility] CM contact information and explained the purpose/benefits for CM services through insurance provider. YOLANDA emailed her a blank ABIEL form to complete [facility] giving her permission to communicate on pt's behalf. She reported that pt is doing well and she is planning to assist her to PCP f/u appointment tomorrow. SW will follow up with daughter at a later date. [place]  Ambulatory Care Coordination Team  [phone]    2/25/20  CT YOLANDA left a message with patient's daughter requesting a call back. YOLANDA will provide daughter with pt's [facility]  name and number and discuss the process of submitting an authorization release to [facility] on behalf of the patient for caregiver purposes.     2/21/20  CT YOLANDA contacted patient's daughter and advised her to contact [place] to initiate Uniform Assessment Instrument screening for personal care/LTC assistance. Daughter reported that she submitted a medicaid application. Daughter in agreement with receiving assistance from PharmD for Diabetes education/tx options for patient. YOLANDA will update Michael Herbert PharmD of this request.  eRnee Braun will follow up at a later date to inquire about progress and offer assistance if needed. [facility]  [phone]    2/20/20  CT YOLANDA was notified of patient's admission to [facility]. YOLANDA left a message with pt's  [place] requesting a uniform assessment screening for personal care/LTC services. YOLANDA will attempt contact at a later date if call is not returned. Daughter was also made aware of the UAI request.      2/18/20  YOLANDA discussed with daughter about the purpose and benefit of receiving Diabetes education from [employer]. Daughter stated that she understands how to manage pt's medical condition, but the challenge is ensuring that pt eat her meals and take medications consistently. Daughter stated that she calls her mom several times during the day to remind her to take meds, eat meals etc.  SW discussed the option of patient attending an adult  program during the week to assist with needs until her personal care is established. Daughter stated that pt would not be interested in attending but she will share this with pt as an option. YOLANDA contacted  DSS to iniate the UAI screening for personal care. YOLANDA advised daughter to contact Mrs. Kevin Rosario at [facility] [phone] to provide additional information for screening. [place] [address] Ambulatory Care Coordination Team  [phone]    2/17/20  YOLANDA contacted daughter for f/u. Daughter reported that she/pt submitted the medicaid application.   YOLANDA explained to daughter the f/u process and services offered though medicaid. SW/daughter also discussed the benefit of having pt's medications bubbled pack and advised her to contact pharmacy to inquire about the process. SW will f/u with Jimmy Gallegos RD to coordinate a meeting for pt/daughter to receive diabetes education. SW will f/u at a later date. [place] [address] Ambulatory Care Coordination Team  [phone]  1/31/2020   YOLANDA attempted to contacted patient's daughter to follow up on progress made with completing medicaid application and offer assistance. SW left a voicemail requesting a call back. [place] [address] Ambulatory Care Coordination Team  [phone]    1/15/2020   CTN YOLANDA received a call back from [place] [address] providing an update on patients home visit. YOLANDA met with patient in the home and spoke with the daughter via phone during the visit. Kirk Garcia reported there were no concerns observed during the visit. Kirk Garcia reported that patient was alert, verbal and oriented during the visit, as she appropriately shared information and answered questions. However, It is noted that [place] SN created goals and interventions to address patients cognitive impairment. Kirk Garcia discussed with daughter the importance of having Medicaid coverage in place, as it could provide personal care aides to assist patient in the home. YOLANDA explained to daughter the concerns that were shared regarding patients care in the home and offered to schedule a date and time to meet with her to complete a Medicaid application and submit a request for UAI screening, but daughter was not able to confirm a date.  YOLANDA will follow up with daughter at a later date to schedule meeting. See [place] SW note.    [facility] Transitions Team   [facility] Ambulatory Care Coordination Team  [phone]    1/10/2020 3:00pm  MARLEN GUERRERO received a call back from [facility] Joseph GUERRERO. MARLEN GUERRERO updated her on the concerns related to patient's care and safety in the home. 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 According to patients daughter, patient owns her home and the son lives with her. Patients daughter is involved with her care but does not live with her. ALMA DELIA SW made aware of the resources that were provided to daughter regarding the Medicaid process and discussed the possibility of patient participating in [facility] chronic care program for disease management. ALMA DELIA GUERRERO is scheduled to conduct a home visit with patient next week. Due to patients cognition limitations, SW suggested that [facility] SW contact patients son/daughter to schedule a home visit. MARLEN GUERRERO mentioned, pending the outcome of home assessment there may be a need for an APS referral.  YOLANDA acknowledged the report given and will follow up with MARLEN GUERRERO at a later date to discuss findings. [place] [address] Ambulatory Care Coordination Team  [phone]    1/9/2020 2:30am  [facility], reported that patient is scheduled to receive a home visit from [address] [place] [address] Cristiane on 1-13-20. MARLEN GUERRERO called home health Joseph GUERRERO requesting a call back to discuss concerns reg patient's care/safety and address social needs. SW will attempt call at a later date if call is not returned. [place] [address] Ambulatory Care Coordination Team  [phone]    1/7/2020  1:00pm  It is noted that patient was recently discharged from the hospital due to DKA. SW contacted patient's daughter, Tino Hollis, to discuss resources to address patient's needs.   Daughter stated that she was busy, but was receptive to taking a few minutes to discuss options related to patient's care. SW explained to daughter the Medicaid process along with the benefits of Medicaid coverage. SW highlighted a few Medicaid services that patient could receive if approved, such as personal care, adult  coverage, assistance with medication cost and alternative placement if needed. Daughter acknowledged the services and stated that her goal is to care for patient in the home. Patient's son live with her and daughter assist with providing care. SW advised daughter to contact [address] [place] [address] to initiate the application process, as it could take up to 45 days for Medicaid approval.  Daughter understood and stated she and patient will complete the application by the end of the week. Daughter denied any additional resources at this time. SW provided daughter my contact information to contact if needed      SW will contact daughter within the next two weeks to assess progress made with completing application, discuss ACP items and offer assistance if needed. [facility] Team   [address] [place] [address] Ambulatory Care Coordination Team  [phone]     12/17/2019  1:30pm  SW attempted to contact patient's daughter for follow up. SW left a detailed voicemail requesting a call back, and suggested that she leave on my voicemail best time/date to reach her. SW will follow up at a later date. 12/13/2019  1:25pm  Patient's daughter requested assistance with understanding medicaid process and initiating application. SW contacted daughter however, she reported that she was unable to talk and stated she would contact SW [phone] at 1:30pm.      [place] will attempt contact on 12-16-19 if call is not returned. YOLANDA will offer assistance with medicaid process and additional resources if needed. [facility] Team     10/27/20 [facility] 10/20-10/25 SHERWIN.   Spoke with daughter today, thinks mother is doing well. Advised her that per [address] in yesterday's visit, meds could not be found, unable to locate glucometer and oven door was open for heat. [facility] nurse notified APS of concerns. Daughter says brother to find her meds. Trying to set up Meet and Greet for aide to meet mom so she can start with personal care 5 x week. Suggested she consider higher level of care for mother, daughter not receptive. CTN to notify Hemant Kahn, of patient's return to home to see if she can assist with any additional resources. mbt

## 2020-11-11 NOTE — PROGRESS NOTES
Subjective:     Interval History:     Day 1 post cycle #1 of CHOP  Tolerated poorly as patient noted diarrhea, diffuse body pain, tremors.   Uric acid decreased post rasburicase (second dose during this hospitalization ) from 8 to 5  Renal function continues to worsen , BUN >100, Cr 4 --> 4.4. Phos elevated at 7 . K+ at 5.8  On high doses of diuretics as below with approx 1200cc in UOP in last 24 hrs, net I/O o f+2.4 L       albuterol-ipratropium 2.5mg-0.5mg/3mL  3 mL Nebulization Q6H    allopurinol  100 mg Oral Daily    chlorothiazide (DIURIL) IVPB  500 mg Intravenous Q12H    fluticasone  1 spray Each Nare Daily    furosemide  120 mg Intravenous Q6H    heparin (porcine)  5,000 Units Subcutaneous Q8H    levothyroxine  100 mcg Oral Before breakfast    predniSONE  100 mg Oral Q24H    sevelamer carbonate  1,600 mg Oral TID WM    sodium bicarbonate  1,300 mg Oral TID    sodium chloride 0.9%  10 mL Intravenous Q6H    tacrolimus  1 mg Oral Daily        acetaminophen, alteplase, dextrose 50%, dextrose 50%, diphenhydrAMINE, glucagon (human recombinant), glucose, glucose, heparin, porcine (PF), HYDROmorphone, HYDROmorphone, influenza, loperamide, ondansetron, simethicone, Flushing PICC Protocol **AND** sodium chloride 0.9% **AND** sodium chloride 0.9%, sodium chloride 0.9%      Objective:     Vital Signs (Most Recent):  Temp: 97.5 °F (36.4 °C) (10/20/17 1438)  Pulse: 79 (10/20/17 1452)  Resp: 18 (10/20/17 1438)  BP: (!) 105/53 (10/20/17 1438)  SpO2: 95 % (10/20/17 1438) Vital Signs (24h Range):  Temp:  [97.5 °F (36.4 °C)-98.4 °F (36.9 °C)] 97.5 °F (36.4 °C)  Pulse:  [76-97] 79  Resp:  [16-20] 18  SpO2:  [92 %-97 %] 95 %  BP: ()/(51-56) 105/53     Weight: 133.8 kg (294 lb 15.6 oz)  Body mass index is 42.32 kg/m².  Body surface area is 2.57 meters squared.    ECOG SCORE         [unfilled]    Intake/Output - Last 3 Shifts       10/18 0700 - 10/19 0659 10/19 0700 - 10/20 0659 10/20 0700 - 10/21 0659    P.O.  Patient latest  asymptomatic, no insulin sliding scale order. Paged Dr Dimple Barragan to inform patient latest BS result, awaiting for his return call. [phone]    I.V. (mL/kg) 55 (0.4) 115 (0.9)     IV Piggyback  150 50    Total Intake(mL/kg) 1830 (13.7) 2120 (15.8) 400 (3)    Urine (mL/kg/hr) 1150 (0.4) 1240 (0.4)     Emesis/NG output       Other 75 (0) 75 (0)     Stool 0 (0) 0 (0)     Blood       Total Output 1225 1315      Net +605 +805 +400           Urine Occurrence  3 x     Stool Occurrence 0 x 4 x 2 x          Physical Exam   Constitutional: He is oriented to person, place, and time. He appears distressed.   Appears toxic   HENT:   Head: Normocephalic and atraumatic.   Mouth/Throat: Mucous membranes are normal. No oropharyngeal exudate.   Eyes: Conjunctivae and EOM are normal. Pupils are equal, round, and reactive to light. No scleral icterus.   Neck: Neck supple.   Cardiovascular: Normal rate and regular rhythm.    Pulmonary/Chest: Effort normal. No respiratory distress. He has decreased breath sounds. He has no wheezes. He has rales (BLLB).   Abdominal: Soft. Normal appearance and bowel sounds are normal. He exhibits distension. There is tenderness.   Musculoskeletal: Normal range of motion. He exhibits no edema or tenderness.   Neurological: He is alert and oriented to person, place, and time. No cranial nerve deficit.   Skin: Skin is warm, dry and intact. No cyanosis. Nails show no clubbing.   Psychiatric: He has a normal mood and affect. His mood appears not anxious.   Nursing note and vitals reviewed.      Significant Labs:   CBC:   Recent Labs  Lab 10/19/17  0359 10/20/17  0335   WBC 12.64 12.80*   HGB 10.3* 10.0*   HCT 30.3* 29.7*    224   , CMP:   Recent Labs  Lab 10/19/17  0359 10/19/17  1531 10/20/17  0335   * 129* 130*  130*   K 4.8 5.1 5.8*  5.8*   CL 90* 90* 90*  90*   CO2 23 23 23  23   * 186* 150*  150*   * 116* 116*  116*   CREATININE 4.0* 4.1* 4.4*  4.4*   CALCIUM 9.9 9.5 9.3  9.3   PROT 5.6*  --  5.4*   ALBUMIN 2.9* 2.8* 2.8*  2.8*   BILITOT 0.8  --  0.7   ALKPHOS 94  --  91   AST 31  --  32   ALT  10  --  13   ANIONGAP 17* 16 17*  17*   EGFRNONAA 14.4* 14.0* 12.8*  12.8*   , Haptoglobin: No results for input(s): HAPTOGLOBIN in the last 48 hours., LDH: No results for input(s): LDHCSF, BFSOURCE in the last 48 hours., LFTs:   Recent Labs  Lab 10/19/17  0359 10/19/17  1531 10/20/17  0335   ALT 10  --  13   AST 31  --  32   ALKPHOS 94  --  91   BILITOT 0.8  --  0.7   PROT 5.6*  --  5.4*   ALBUMIN 2.9* 2.8* 2.8*  2.8*    and Uric Acid   Recent Labs  Lab 10/19/17  0359 10/19/17  1531 10/20/17  0335   URICACID 8.2* 5.9 4.9

## 2020-11-11 NOTE — PROGRESS NOTES
PT visit attempted per nurse pts BP and BG running high but is moving well to restroom. Will defer at this time and contiue to follow.

## 2020-11-11 NOTE — PROGRESS NOTES
ALEKSANDAR  1) daughter's home with home health [facility])  2) follow up appointments scheduled and on AVS  3) daughter to be at hospital by 9:00am to go over d/c papers with nurse and transport patient home    3:00pm  CM informed that patient will no longer be d/c'ing today due to high blood sugars. 8:00am  CM contacted patient's daughter, Kamlesh Ellis her that patient has been d/c. Dtr reported she would be here within the hour to go over d/c papers with nurse and transport patient home. CM informed nurse.      Beverly OrtizLens, [address] [facility]

## 2020-11-11 NOTE — PROGRESS NOTES
Care Manager  contacted [facility] ([phone]) to inform that the LTSS/UAI screening was not completed at [facility] but actually at [facility] will need to be contacted to obtain a copy of this screening. Ms. Anila Braden will contact daughter to have her reach out to [facility] worker to request the LTSS screening to start private duty care. Alannah Toledo, [address] [facility]  [employer] ACP Facilitator  Zone Phone: [phone]

## 2020-11-11 NOTE — PROGRESS NOTES
Problem: Falls - Risk of  Goal: *Absence of Falls  Description: Document Richard Merlin Fall Risk and appropriate interventions in the flowsheet. Outcome: Progressing Towards Goal  Note: Fall Risk Interventions:  Mobility Interventions: Assess mobility with egress test, Communicate number of staff needed for ambulation/transfer, Patient to call before getting OOB    Mentation Interventions: Adequate sleep, hydration, pain control, Bed/chair exit alarm, Door open when patient unattended, Evaluate medications/consider consulting pharmacy    Medication Interventions: Assess postural VS orthostatic hypotension, Bed/chair exit alarm, Evaluate medications/consider consulting pharmacy, Patient to call before getting OOB    Elimination Interventions: Bed/chair exit alarm, Call light in reach, Patient to call for help with toileting needs              Problem: Pressure Injury - Risk of  Goal: *Prevention of pressure injury  Description: Document Madhu Scale and appropriate interventions in the flowsheet.   Outcome: Progressing Towards Goal  Note: Pressure Injury Interventions:  Sensory Interventions: Assess changes in LOC, Assess need for specialty bed, Avoid rigorous massage over bony prominences, Float heels, Keep linens dry and wrinkle-free    Moisture Interventions: Absorbent underpads, Internal/External urinary devices, Maintain skin hydration (lotion/cream)    Activity Interventions: Chair cushion, Increase time out of bed, Pressure redistribution bed/mattress(bed type), PT/OT evaluation    Mobility Interventions: Chair cushion, Float heels, Pressure redistribution bed/mattress (bed type)    Nutrition Interventions: Document food/fluid/supplement intake, Offer support with meals,snacks and hydration    Friction and Shear Interventions: Apply protective barrier, creams and emollients, HOB 30 degrees or less

## 2020-11-11 NOTE — PROGRESS NOTES
End of Shift Note    Bedside shift change report given to Lorenzo De La Cruz RN (oncoming nurse) by Germaine Guillen (offgoing nurse). Report included the following information SBAR and Kardex    Shift worked:  11p- 7a   Shift summary and any significant changes:     uneventful shift. Patient resting       Concerns for physician to address:  n/a   Zone phone for oncoming shift:        Activity:  Activity Level: Up with Assistance  Number times ambulated in hallways past shift: 0  Number of times OOB to chair past shift: 0    Cardiac:   Cardiac Monitoring: Yes      Cardiac Rhythm: Normal sinus rhythm    Access:   Current line(s): PIV     Genitourinary:   Urinary status: voiding and incontinent    Respiratory:   O2 Device: Room air  Chronic home O2 use?: NO  Incentive spirometer at bedside: NO     GI:  Last Bowel Movement Date: 11/10/20  Current diet:  DIET DIABETIC CONSISTENT CARB Regular  Passing flatus: YES  Tolerating current diet: YES  % Diet Eaten: 100 %    Pain Management:   Patient states pain is manageable on current regimen: YES    Skin:  Madhu Score: 17  Interventions: float heels, increase time out of bed and foam dressing    Patient Safety:  Fall Score:  Total Score: 3  Interventions: bed/chair alarm, assistive device (walker, cane, etc), gripper socks and pt to call before getting OOB  High Fall Risk: Yes    Length of Stay:  Expected LOS: 4d 19h  Actual LOS: Tawnya Vallejo

## 2020-11-12 VITALS
SYSTOLIC BLOOD PRESSURE: 139 MMHG | WEIGHT: 133 LBS | DIASTOLIC BLOOD PRESSURE: 65 MMHG | BODY MASS INDEX: 23.57 KG/M2 | TEMPERATURE: 97.7 F | RESPIRATION RATE: 18 BRPM | OXYGEN SATURATION: 99 % | HEART RATE: 69 BPM | HEIGHT: 63 IN

## 2020-11-12 LAB
GLUCOSE BLD STRIP.AUTO-MCNC: 114 MG/DL (ref 65–100)
GLUCOSE BLD STRIP.AUTO-MCNC: 178 MG/DL (ref 65–100)
GLUCOSE BLD STRIP.AUTO-MCNC: 49 MG/DL (ref 65–100)
GLUCOSE BLD STRIP.AUTO-MCNC: 53 MG/DL (ref 65–100)
GLUCOSE BLD STRIP.AUTO-MCNC: 58 MG/DL (ref 65–100)
GLUCOSE BLD STRIP.AUTO-MCNC: 58 MG/DL (ref 65–100)
GLUCOSE BLD STRIP.AUTO-MCNC: 60 MG/DL (ref 65–100)
GLUCOSE BLD STRIP.AUTO-MCNC: 90 MG/DL (ref 65–100)
SERVICE CMNT-IMP: ABNORMAL
SERVICE CMNT-IMP: NORMAL

## 2020-11-12 PROCEDURE — 74011250637 HC RX REV CODE- 250/637: Performed by: INTERNAL MEDICINE

## 2020-11-12 PROCEDURE — 82962 GLUCOSE BLOOD TEST: CPT

## 2020-11-12 PROCEDURE — 74011636637 HC RX REV CODE- 636/637: Performed by: INTERNAL MEDICINE

## 2020-11-12 PROCEDURE — 74011250636 HC RX REV CODE- 250/636: Performed by: INTERNAL MEDICINE

## 2020-11-12 PROCEDURE — 74011636637 HC RX REV CODE- 636/637: Performed by: STUDENT IN AN ORGANIZED HEALTH CARE EDUCATION/TRAINING PROGRAM

## 2020-11-12 PROCEDURE — 74011250637 HC RX REV CODE- 250/637: Performed by: NURSE PRACTITIONER

## 2020-11-12 RX ORDER — INSULIN DEGLUDEC INJECTION 100 U/ML
12 INJECTION, SOLUTION SUBCUTANEOUS DAILY
Qty: 2 ADJUSTABLE DOSE PRE-FILLED PEN SYRINGE | Refills: 11 | Status: ON HOLD | OUTPATIENT
Start: 2020-11-12 | End: 2020-12-06 | Stop reason: SDUPTHER

## 2020-11-12 RX ADMIN — ENOXAPARIN SODIUM 40 MG: 40 INJECTION SUBCUTANEOUS at 09:17

## 2020-11-12 RX ADMIN — LEVOTHYROXINE SODIUM 175 MCG: 0.03 TABLET ORAL at 06:36

## 2020-11-12 RX ADMIN — METOPROLOL SUCCINATE 25 MG: 25 TABLET, EXTENDED RELEASE ORAL at 09:16

## 2020-11-12 RX ADMIN — INSULIN LISPRO 2 UNITS: 100 INJECTION, SOLUTION INTRAVENOUS; SUBCUTANEOUS at 13:09

## 2020-11-12 RX ADMIN — CLOPIDOGREL BISULFATE 75 MG: 75 TABLET ORAL at 09:16

## 2020-11-12 RX ADMIN — INSULIN LISPRO 3 UNITS: 100 INJECTION, SOLUTION INTRAVENOUS; SUBCUTANEOUS at 09:16

## 2020-11-12 RX ADMIN — AMLODIPINE BESYLATE 10 MG: 5 TABLET ORAL at 09:16

## 2020-11-12 RX ADMIN — Medication 10 ML: at 13:10

## 2020-11-12 RX ADMIN — PRAVASTATIN SODIUM 80 MG: 40 TABLET ORAL at 09:16

## 2020-11-12 RX ADMIN — INSULIN GLARGINE 10 UNITS: 100 INJECTION, SOLUTION SUBCUTANEOUS at 10:39

## 2020-11-12 RX ADMIN — INSULIN LISPRO 3 UNITS: 100 INJECTION, SOLUTION INTRAVENOUS; SUBCUTANEOUS at 13:09

## 2020-11-12 RX ADMIN — Medication 1 CAPSULE: at 09:00

## 2020-11-12 RX ADMIN — Medication 10 ML: at 06:36

## 2020-11-12 NOTE — PROGRESS NOTES
ALEKSANDAR  1) daughter's home with home health [facility])  2) follow up appointments scheduled and on AVS  3) daughter or son to provide transportation at d/c  4) clear for d/c from CM standpoint     CM acknowledge that patient's blood sugar was 49 this morning, per nurse note. Unclear as to if patient will still d/c today. Patient is clear for d/c from CM standpoint at this time.      Candi York, [facility]

## 2020-11-12 NOTE — PROGRESS NOTES
Plan was for discharge today.  and /70. Lantus 16 U given this a.m, lispro 3U w/ breakfast   RN to give lunch time coverage and sliding scale  PRN hydralazine added.    Monitor BG and BP

## 2020-11-12 NOTE — ROUTINE PROCESS
Discharge AVS reviewed with pt and her Daudhter. All medications reviewed individually with patient. Opportunities for questions and concerns provided.

## 2020-11-12 NOTE — PROGRESS NOTES
REFILL REQUEST FROM PHARMACY                Orthopedic End of Shift Note    Bedside shift change report given to Kai Mc RN (oncoming nurse) by Ciera Keller (offgoing nurse). Report included the following information SBAR, Kardex, ED Summary, Intake/Output, MAR, Recent Results and Cardiac Rhythm NSR.      POD# none  Significant issues during shift: blood glucose    Issues for Physician to address: blood glucose    Activity This Shift  (check all that apply) [] chair  [] dangle   [] bathroom  [x] bedside commode [] hallway  [] bedrest   Nausea/Vomiting [] yes [x] no     Voiding Status [x] void [] Ren [] I&O Cath   Bowel Movements [x] yes [] no     Foot Pumps or SCD [] yes [x] no    Ice Pack [] yes    [x] no    Incentive Spirometer [] yes [x] no    Telemetry Monitoring   [x] yes [] no Rhythm: NSR   Supplemental O2 [] yes [x] no Sat off O2:  97 %

## 2020-11-12 NOTE — PROGRESS NOTES
Bedside shift change report given to [address] [place] [address] (oncoming nurse) by Stacy Rome RN (offgoing nurse). Report included the following information SBAR, Kardex, Intake/Output, MAR and Recent Results.

## 2020-11-12 NOTE — PROGRESS NOTES
1000 Informed Dr Benigno Mendoza pt's BS was 49 in the morning, she asked to give only 10 units of Lantus    1742 Informed Dr Benigno Mendoza that Pt's BS drop to 60 again and with orange juice it comes up to 90 units.  , she stated pt can go home and she will adjust home insulin, so will print the new discharge AVS

## 2020-11-12 NOTE — PROGRESS NOTES
Comprehensive Nutrition Assessment    Type and Reason for Visit: Reassess    Nutrition Recommendations/Plan:   Continue consistent carb diet    Nutrition Assessment:      11/12: Chart reviewed for reassessment. Pt remains on CCD with 100% intakes recorded. Pt was supposed to d/c yesterday, cancelled d/t  and /70. Plans for d/c again today, but BG was down to 49 this morning. Unsure of d/c at this time. Will continue monitoring if pt stays. 11/6: Patient medically noted for DKA and bacteremia. PMH HTN, vascular dementia, and CVA. Chart reviewed for length of stay. Patient having lunch at time of visit. She notes a good appetite and eating well. She had eaten >50% of lunch and was still working. Fluctuation in BG noted. Continue CCD. Encourage intake of meals. Estimated Daily Nutrient Needs:  Energy (kcal): 1364 kcal (BMR 1059 x 1. 3AF); Weight Used for Energy Requirements: Current  Protein (g): 72g (1.2 g/kg bw); Weight Used for Protein Requirements: Current  Fluid (ml/day): 1350 mL; Method Used for Fluid Requirements: 1 ml/kcal      Nutrition Related Findings:  Labs: BG 49-114mg/dL. Hgb 8.4. No other labs since 11/9. Meds: lovenox, lantus, humalog, floraQ, synthroid. Trace edema.  BM 11/10      Wounds:    None       Current Nutrition Therapies:  DIET DIABETIC CONSISTENT CARB Regular    Anthropometric Measures:  · Height:  5' 3\" (160 cm)  · Current Body Wt:  60.3 kg (132 lb 15 oz)   · Ideal Body Wt:  115 lbs:  115.6 %   · BMI Category:  Normal weight (BMI 22.0-24.9) age over 72       Nutrition Diagnosis:   No nutrition diagnosis at this time     Nutrition Interventions:   Food and/or Nutrient Delivery: Continue current diet  Nutrition Education and Counseling: No recommendations at this time  Coordination of Nutrition Care: Continue to monitor while inpatient    Goals:  Continue PO intake >70% meals/snacks next 5-7 days       Nutrition Monitoring and Evaluation:   Behavioral-Environmental Outcomes: None identified  Food/Nutrient Intake Outcomes: Food and nutrient intake  Physical Signs/Symptoms Outcomes: Biochemical data, Weight    Discharge Planning:    Continue current diet     Electronically signed by Girtha Sever, RD on 11/12/2020 at 2:47 PM    Contact: [phone]  Pager [phone]

## 2020-11-12 NOTE — PROGRESS NOTES
Bedside and Verbal shift change report given to [address] [place] [address] (oncoming nurse) by Norma Putnam RN (offgoing nurse). Report included the following information SBAR, Kardex, Intake/Output, MAR and Recent Results.

## 2020-11-13 ENCOUNTER — PATIENT OUTREACH (OUTPATIENT)
Dept: CASE MANAGEMENT | Age: 80
End: 2020-11-13

## 2020-11-13 NOTE — PROGRESS NOTES
Patient was admitted to [facility] on 10/31 and discharged on  for DKA. Outreach made within 2 business days of discharge: Yes    Top Discharge Challenges to be reviewed by the provider   Additional needs identified to be addressed with provider yes  Staying at daughter's house. Gosia Chaney said BS was 509 last night. Did not check FBS this am. Advised to check before lunch and call pcp if above 300 for directions. Discussed COVID-19 related testing which was not done at this time. Test results were not done. Method of communication with provider : chart routing       Advance Care Planning:   Does patient have an Advance Directive:  currently not on file; patient declined education    Inpatient Readmission Risk score: 61%  Was this a readmission? yes  Patient stated reason for the admission: elevated blood sugar and confusion  Patients top risk factors for readmission: functional cognitive ability, functional physical ability, ineffective coping, lack of knowledge about disease, medical condition, medication management, stages of grief and support system  Interventions to address risk factors: Patient moved to daughter's home after discharge, daughter will monitor and give her meds to her. Reviewed discharge instructions with daughter, reviewed meds, reviewed red flags: elevated blood sugar,confusion, sob, falls,weakness,fever,nausea,vomiting,diarrhea,chest discomfort. Care Transition Nurse (CTN) contacted the family by telephone to perform post hospital discharge assessment. Verified name and  with family as identifiers. Provided introduction to self, and explanation of the CTN role. Daughter reports she is doing fine, slept well. Ate breakfast this am- oatmeal,egg/plata. Blood sugar 509 last night but did not take fasting this am. Advised to check blood sugar before lunch and call pcp if > 300. Given info on [employer] as resource.     CTN reviewed discharge instructions, medical action plan and red flags with family who verbalized understanding. Family given an opportunity to ask questions and does not have any further questions or concerns at this time. The family agrees to contact the PCP office for questions related to their healthcare. Medication reconciliation was performed with family, who verbalizes understanding of administration of home medications. Advised obtaining a 90-day supply of all daily and as-needed medications. Referral to Pharm D needed: no     Home Health/Outpatient orders at discharge: home health care, PT, OT and [address]: [facility]  Date of initial visit: pending- advised daughter to call at noon if no word from them. Durable Medical Equipment ordered at discharge: none  [address] - [address] received: na    Covid Risk Education    Patient has following risk factors of: sepsis and diabetes. Education provided regarding infection prevention, and signs and symptoms of COVID-19 and when to seek medical attention with family who verbalized understanding. Discussed exposure protocols and quarantine From CDC: Are you at higher risk for severe illness?  and given an opportunity for questions and concerns. The family agrees to contact the COVID-19 hotline [phone] or PCP office for questions related to COVID-19. For more information on steps you can take to protect yourself, see CDC's How to Protect Yourself     Patient/family/caregiver given information for [employer] and agrees to enroll no  Patient's preferred e-mail: declines  Patient's preferred phone number: declines    Discussed follow-up appointments.  If no appointment was previously scheduled, appointment scheduling offered: yes  [facility] follow up appointment(s):   Future Appointments   Date Time Provider Tawnya Evans   11/17/2020  1:15 PM Daysi Junior MD [facility] MAIN BS AMB     Non-BS follow up appointment(s): na  Plan for follow-up call in 5-7 days based on severity of symptoms and risk factors. CTN provided contact information for future needs. Goals Addressed                 This Visit's Progress     Establish PCP relationships and regularly scheduled appointments. 9/7/20 Pt.lives with sons, family provides transportation to appts. Family will schedule follow up with pt. PCP Shine Landa MD) [phone]. CTN [facility]family with [facility][phone])  explain briefly type of service;  contact information provided. CTN will f/u with pt.in 1-2 weeks. -[facility]           Patient verbalizes understanding of self -management goals of living with Diabetes. 11/13/20 [id number] 10/31-11/12 DKA  (multiple adm this year for DKA)   Patient went to daughter's house after discharge.  Reviewed discharge instructions with daughter.  Reviewed meds with daughter-    Reviewed red flags: fever,nausea,vomiting,diarrhea,confusion,weakness,sob, chest discomfort.  Reminded daughter to check blood sugar qid and report if > 300 to pcp.  Reviewed diabetic diet with daughter- plate method. 1/2 plate with non starchy veggies, 1/4 plate with lean protein, and 1/4 plate with healthy fats/grains.  Given info on [employer] as resource.  Given CTN contact info if any questions/concerns.    Advised CTN to check back in about a week, sooner [email or web address]

## 2020-11-16 ENCOUNTER — PATIENT OUTREACH (OUTPATIENT)
Dept: CASE MANAGEMENT | Age: 80
End: 2020-11-16

## 2020-11-16 NOTE — PROGRESS NOTES
Patient has graduated from the Care Transitions program on 11/17/20. Patient/family has the ability to self-manage at this time Care transitions goals have been completed. Patient will receive further case management from her assigned complex care coordinator with Johana Munoz. No further Care Transition Social Work follow up scheduled. Goals Addressed                 This Visit's Progress     Supportive resources: medicaid assistance and options for caregiver support          11/16/2020   12:30pm  SW attempted to contact pt's Care Coordinator with [facility] to request assistance with ongoing care coordination. YOLANDA spoke with Hawa Vazquez who was covering for pt's assigned CC Johana Hicks. SW explained the reason for care coordination services to establish care. Cathern Blizzard reported that she will contact daughter today to discuss and assist with establishing personal care hours and other social needs. Daughter later confirmed that she received a follow up call from Cathern Blizzard. There is no further outreach scheduled at this time. 11:00am   YOLANDA contacted patient's daughter, Ismael Rutledge for follow up. Patient is currently living with daughter at this time. She reported that patient was approved to receive personal care services through [address] Monday-Friday 8am to 2pm starting this week. Patient is also scheduled to receive a visit from home health nurse today with Mathieu De Leon. Daughter inquired about a hospital bed and was advised to follow up with PCP/Home Health nurse for coordination of DME. SW also encouraged daughter to contact [facility] DSS to identify contact information for pt's Medicaid worker for further assistance if needed.   Yumiko Brink, [facility] Team   [facility] Ambulatory Care Coordination Team  [phone]        11/11/2020   YOLANDA contacted Nancy with [address] to inquire about the receipt of UAI screening. She stated that she did not receive a complete screening via fax. Daughter reported she had difficulty faxing information. YOLANDA contacted CM Brenda Felty at [facility] to make a request to have UAI/96 faxed to [facility] at Fax: [phone]. Daughter made aware of request.  Jasmin Broderick will follow up at a later date. Karly Vazquez, [address] [place] [address] Team   [facility] Ambulatory Care Coordination Team  [phone]     11/6/2020   YOLANDA contacted [facility] [phone] to inquire about their availability to provide personal care services through [facility]. SW spoke with Anjelica Samuel and she is willing to accept patient's case for personal care. She requested a copy of pt's Uniform Assessment Instrument screening to initiate process. YOLANDA contacted pt's daughter, Marcin Martines to update her on the information that is requested for services. YOLANDA provided her with owner's name, fax number and e-mail address to receive UAI screening. Fax number: [phone], e-mail address: [email or web address]. YOLANDA will follow up with daughter at a later date for provide further assistance if needed,  Karly Vazquez, [facility] Team   [facility] Ambulatory Care Coordination Team  [phone]       11/4/2020   YOLANDA contacted [facility] to inquire about status of personal care services. Rep reported that they have not found a care aide that is able to take the case at this time. YOLANDA will make another attempt to contact pt's Medicaid Care Coordinator for assistance. 10/29/2020   Follow up   YOLANDA contacted daughter to inquire about status of patient's LTC Medicaid coverage. Daughter stated that patient was approved for LTC Medicaid and she was given a copy of Uniform Assessment Instrument for personal care.   Daughter reported that she is having difficulty with identifying a personal care agency that can provide services for pt due to various reasons. Daughter provided SW with a list of agencies that she has contacted. Patient was approved for 40hrs a week for personal care. Plan  SW contacted [facility] [phone] and they are willing to accept the case pending the availability for aides that could render the service in her area. Home Health representative will contact daughter for additional follow up. SW also contacted Tammy to speak with patient's Complex Care Coordinator. SW left a voicemail with ARCELIA So [phone] requesting a call back for assistance with care coordination. SW shared this update with patient's daughter and advised her to follow up with CC next week. SW will follow up at a later date. Mathieu Rice [address] [place] [address] Transitions Team   [address] [place] [address] Care Coordination Team  [phone]         10/14/2020   Follow up  SW received a referral from [facility] requesting a follow up call with patient's daughter to assist her with signing patient up for Meals on Wheels program.  CTN contacted [facility] enrolling patient into their home delivered meals program (10 meals) following her recent discharge from hospital.  SW contacted daughter Anival Goodrich for follow up. Plan  SW completed the Meals on Wheels application online with daughter's assistance. Advise daughter to contact [employer] after 2 weeks if she has not received approval. Per Anival Goodrich, she is expecting to receive notification from [address] regarding pt's status for LTC/personal care Medicaid coverage. SW explained to daughter the process for selecting  care agencies and advise her to follow up for assistance if needed. Daughter understood and voiced no additional questions or concerns during the call.   Mathieu Rice, [facility] Team   [facility]  [phone]     7/6/20  SW received referral from [facility] requesting a follow up call with patient's daughter to offer assistance/support with establishing personal care and assistance for patient. Per CTN, a UAI screening was completed while pt was hospitalized and daughter was working with [facility] program to establish day program/Medicaid. SW left voice mails requesting a call back. SW will follow up once call is returned  Christine Lira [address] [place] [address] Ambulatory Care Coordination Team  [phone]      2/26/2020   CT SW received a call from daughter discussing pt's plan. Daughter stated that she is having difficulty with getting in contact with [facility] to initiate UAI screening for personal care. SW reviewed number that was provided and encouraged her to make another outreach attempt. SW also provided her with pt's [employer] CM contact information and explained the purpose/benefits for CM services through insurance provider. SW emailed her a blank ABIEL form to complete and fax to [facility] giving her permission to communicate on pt's behalf. She reported that pt is doing well and she is planning to assist her to PCP f/u appointment tomorrow. SW will follow up with daughter at a later date. [facility] Team  [phone]    2/25/20  CT YOLANDA left a message with patient's daughter requesting a call back. SW will provide daughter with pt's [employer]  name and number and discuss the process of submitting an authorization release to [facility] on behalf of the patient for caregiver purposes. 2/21/20  CT YOLANDA contacted patient's daughter and advised her to contact [facility] to initiate Uniform Assessment Instrument screening for personal care/LTC assistance. Daughter reported that she submitted a medicaid application. Daughter in agreement with receiving assistance from PharmD for Diabetes education/tx options for patient.  Navjot Founds will update Maris Mo PharmD of this request.  Chinyere Pastrana will follow up at a later date to inquire about progress and offer assistance if needed. [place]  Ambulatory Care Coordination Team  [phone]    2/20/20  CT YOLANDA was notified of patient's admission to [facility]. SW left a message with pt's  Trudi Escoto requesting a uniform assessment screening for personal care/LTC services. SW will attempt contact at a later date if call is not returned. Daughter was also made aware of the UAI request.      2/18/20  YOLANDA discussed with daughter about the purpose and benefit of receiving Diabetes education from [address]. Daughter stated that she understands how to manage pt's medical condition, but the challenge is ensuring that pt eat her meals and take medications consistently. Daughter stated that she calls her mom several times during the day to remind her to take meds, eat meals etc.  SW discussed the option of patient attending an adult  program during the week to assist with needs until her personal care is established. Daughter stated that pt would not be interested in attending but she will share this with pt as an option. YOLANDA contacted  DSS to iniate the UAI screening for personal care. SW advised daughter to contact [facility] [phone] to provide additional information for screening. [place]  Ambulatory Care Coordination Team  [phone]    2/17/20  YOLANDA contacted daughter for f/u. Daughter reported that she/pt submitted the medicaid application. SW explained to daughter the f/u process and services offered though medicaid. SW/daughter also discussed the benefit of having pt's medications bubbled pack and advised her to contact pharmacy to inquire about the process.   YOLANDA will f/u with Maris Mo RD to coordinate a meeting for pt/daughter to receive diabetes education. SW will f/u at a later date. [place] [address] Ambulatory Care Coordination Team  [phone]  1/31/2020   YOLANDA attempted to contacted patient's daughter to follow up on progress made with completing medicaid application and offer assistance. SW left a voicemail requesting a call back. [place] [address] Ambulatory Care Coordination Team  [phone]    1/15/2020   MARLEN GUERRERO received a call back from [place] [address] providing an update on patients home visit. YOLANDA met with patient in the home and spoke with the daughter via phone during the visit. Claudia Floyd reported there were no concerns observed during the visit. Claudia Floyd reported that patient was alert, verbal and oriented during the visit, as she appropriately shared information and answered questions. However, It is noted that [place] SN created goals and interventions to address patients cognitive impairment. Claudia Floyd discussed with daughter the importance of having Medicaid coverage in place, as it could provide personal care aides to assist patient in the home. YOLANDA explained to daughter the concerns that were shared regarding patients care in the home and offered to schedule a date and time to meet with her to complete a Medicaid application and submit a request for UAI screening, but daughter was not able to confirm a date.  YOLANDA will follow up with daughter at a later date to schedule meeting. See Baljeet GUERRERO note. [place] [address] Ambulatory Care Coordination Team  [phone]    1/10/2020 3:00pm  MARLEN GUERRERO received a call back from Baljeet GUERRERO, Alethea Proctor. MARLEN GUERRERO updated her on the concerns related to patient's care and safety in the home.  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 According to patients daughter, patient owns her home and the son lives with her. Patients daughter is involved with her care but does not live with her. ALMA DELIA GUERRERO made aware of the resources that were provided to daughter regarding the Medicaid process and discussed the possibility of patient participating in [facility]note 3[facility] chronic care program for disease management. ALMA DELIA GUERRERO is scheduled to conduct a home visit with patient next week. Due to patients cognition limitations, SW suggested that [place] SW contact patients son/daughter to schedule a home visit. CTN SW mentioned, pending the outcome of home assessment there may be a need for an APS referral. ALMA DELIA GUERRERO acknowledged the report given and will follow up with CTN SW at a later date to discuss findings. [place] [address] Ambulatory Care Coordination Team  [phone]    1/9/2020 2:30am  [facility], reported that patient is scheduled to receive a home visit from [address] [place] [address] on 1-13-20. CTN SW called home health SW, Reji Junior requesting a call back to discuss concerns reg patient's care/safety and address social needs. SW will attempt call at a later date if call is not returned. [place] [address] Ambulatory Care Coordination Team  [phone]    1/7/2020  1:00pm  It is noted that patient was recently discharged from the hospital due to DKA. SW contacted patient's daughter, Pastor Evans, to discuss resources to address patient's needs. Daughter stated that she was busy, but was receptive to taking a few minutes to discuss options related to patient's care. SW explained to daughter the Medicaid process along with the benefits of Medicaid coverage.   SW highlighted a few Medicaid services that patient could receive if approved, such as personal care, adult  coverage, assistance with medication cost and alternative placement if needed. Daughter acknowledged the services and stated that her goal is to care for patient in the home. Patient's son live with her and daughter assist with providing care. SW advised daughter to contact [address] [place] [address] to initiate the application process, as it could take up to 45 days for Medicaid approval.  Daughter understood and stated she and patient will complete the application by the end of the week. Daughter denied any additional resources at this time. SW provided daughter my contact information to contact if needed      SW will contact daughter within the next two weeks to assess progress made with completing application, discuss ACP items and offer assistance if needed. [facility] Team   [address] [place] [address] Ambulatory Care Coordination Team  [phone]     12/17/2019  1:30pm  SW attempted to contact patient's daughter for follow up. SW left a detailed voicemail requesting a call back, and suggested that she leave on my voicemail best time/date to reach her. SW will follow up at a later date. 12/13/2019  1:25pm  Patient's daughter requested assistance with understanding medicaid process and initiating application. SW contacted daughter however, she reported that she was unable to talk and stated she would contact SW [phone] at 1:30pm.      Lawanda Eucead will attempt contact on 12-16-19 if call is not returned. YOLANDA will offer assistance with medicaid process and additional resources if needed. [facility] Team     10/27/20 [facility] 10/20-10/25 DKCELINE. Spoke with daughter today, thinks mother is doing well. Advised her that per [address] in yesterday's visit, meds could not be found, unable to locate glucometer and oven door was open for heat. [facility] notified APS of concerns.   Daughter says brother to find her meds. Trying to set up Meet and Greet for aide to meet mom so she can start with personal care 5 x week. Suggested she consider higher level of care for mother, daughter not receptive. CTN to notify Lemuel Елена, of patient's return to home to see if she can assist with any additional resources. mbt              Patient has Care Transitions  contact information for any further questions, concerns, or needs.   Patients upcoming visits:    Future Appointments   Date Time Provider Tawnya Evans   11/17/2020  1:15 PM Jostin De La Vega MD [facility] MAIN BS AMB

## 2020-11-16 NOTE — PROGRESS NOTES
Daughter,Nidhi, called- has not heard anything from [facility]. Advised to call and find out when they are coming. Patient was discharged on 11/12. Daughter called CTN back and said [facility] does not have any orders. Advised daughter that I will check with the Hospital CM about the referral to [address] [place] [address] Keith Redd and let her know. Message sent to Reagan Snyder at [facility]. Received message from CM at [facility] that orders were sent and received by [facility]- she contacted them today and they will be seeing patient today. Daughter notified.

## 2020-11-17 ENCOUNTER — OFFICE VISIT (OUTPATIENT)
Dept: INTERNAL MEDICINE CLINIC | Age: 80
End: 2020-11-17
Payer: MEDICARE

## 2020-11-17 VITALS
RESPIRATION RATE: 14 BRPM | SYSTOLIC BLOOD PRESSURE: 133 MMHG | TEMPERATURE: 98.1 F | BODY MASS INDEX: 25.36 KG/M2 | HEIGHT: 63 IN | DIASTOLIC BLOOD PRESSURE: 97 MMHG | HEART RATE: 74 BPM | OXYGEN SATURATION: 98 % | WEIGHT: 143.1 LBS

## 2020-11-17 DIAGNOSIS — E10.649 HYPOGLYCEMIA UNAWARENESS IN TYPE 1 DIABETES MELLITUS (HCC): ICD-10-CM

## 2020-11-17 DIAGNOSIS — E10.10 DIABETIC KETOACIDOSIS WITHOUT COMA ASSOCIATED WITH TYPE 1 DIABETES MELLITUS (HCC): Primary | ICD-10-CM

## 2020-11-17 DIAGNOSIS — I10 ESSENTIAL HYPERTENSION: ICD-10-CM

## 2020-11-17 DIAGNOSIS — R79.89 PRERENAL AZOTEMIA: ICD-10-CM

## 2020-11-17 DIAGNOSIS — E03.2 HYPOTHYROIDISM DUE TO MEDICATION: ICD-10-CM

## 2020-11-17 DIAGNOSIS — R78.81 BACTEREMIA: ICD-10-CM

## 2020-11-17 DIAGNOSIS — E78.5 DYSLIPIDEMIA: ICD-10-CM

## 2020-11-17 DIAGNOSIS — Z09 HOSPITAL DISCHARGE FOLLOW-UP: ICD-10-CM

## 2020-11-17 DIAGNOSIS — F01.50 VASCULAR DEMENTIA WITHOUT BEHAVIORAL DISTURBANCE (HCC): Chronic | ICD-10-CM

## 2020-11-17 PROCEDURE — G8427 DOCREV CUR MEDS BY ELIG CLIN: HCPCS | Performed by: INTERNAL MEDICINE

## 2020-11-17 PROCEDURE — 99496 TRANSJ CARE MGMT HIGH F2F 7D: CPT | Performed by: INTERNAL MEDICINE

## 2020-11-17 PROCEDURE — 1111F DSCHRG MED/CURRENT MED MERGE: CPT | Performed by: INTERNAL MEDICINE

## 2020-11-17 NOTE — PROGRESS NOTES
Chief Complaint   Patient presents with    Transitions Of Care     Patient admitted to [facility] on 10/31/2020 for DKA. 1. Have you been to the ER, urgent care clinic since your last visit? Hospitalized since your last visit? Yes When: 10/31/2020 Where: [facility]  Reason for visit: DKA    2. Have you seen or consulted any other health care providers outside of the [address] [place] [address] since your last visit? Include any pap smears or colon screening.  No

## 2020-11-22 PROBLEM — R79.89 PRERENAL AZOTEMIA: Status: ACTIVE | Noted: 2020-11-22

## 2020-11-22 PROBLEM — R78.81 BACTEREMIA: Status: ACTIVE | Noted: 2020-11-22

## 2020-11-22 NOTE — PROGRESS NOTES
[address] [facility]  [place] [address]  [address] [place] [address]  Phone:  [phone]  Fax: [phone]       Chief Complaint   Patient presents with    Transitions Of Care     Patient admitted to [facility] on 10/31/2020 for DKA. .      SUBJECTIVE:    Roseanna Borjas is a 67 y.o. female Patient was most recently hospitalized for DKA complicated by bacteremia. The DKA was rapidly resolved with use of a Glucommander and protocol. Hydration also occurred. Initially she had a low grade fever, but this abated fairly rapidly. She was started on broad spectrum antibiotics at the time because of the possibility of infection. Cultures were positive, however it was felt by infectious disease that the organisms were probably contaminants because it was only in one of two to three bottles. She remained on antibiotics for a period of time and this was discontinued with no resumption of the fever. Infectious disease agreed with the outline. As far as her diabetes is concerned, typically when she gets in the hospital, insulin requirements drop precipitously, as would be expected given the fact that she has minimal, if any, insulin resistance and euglycemia fosters further euglycemia for the most part. As the hospital stay progressed, insulin requirements began to increase. Unfortunately, discharging the patient on insulin based on hospital blood sugars is not adequate and will oftentimes lead to recurring hyperglycemia. This is indeed what happened. Interestingly, her daughter accompanies her for the first time in months. She brings in several blood sugars. As expected, the blood sugars have been progressively increasing. She is taking her prandial insulin also. Overall control remains quite poor because there is no one that is in the house continuously that is capable of making adjustments based on blood sugars.     She has a past history of primary hypertension, dyslipidemia and hypothyroidism. Current Outpatient Medications   Medication Sig Dispense Refill    insulin degludec Natalie Sa FlexTouch U-100) 100 unit/mL (3 mL) inpn 12 Units by SubCUTAneous route daily. 2 Adjustable Dose Pre-filled Pen Syringe 11    insulin lispro (HUMALOG) 100 unit/mL kwikpen 3 units before meals breakfast and lunch and 2 units before dinner 2 Adjustable Dose Pre-filled Pen Syringe 11    L. acidoph & paracasei- S therm- Bifido (GALILEA-Q/RISAQUAD) 8 billion cell cap cap Take 1 Cap by mouth daily. 20 Cap 0    amLODIPine (NORVASC) 10 mg tablet Take 1 Tab by mouth daily. 30 Tab 11    zinc oxide 20 % ointment Apply 1 Applicator to affected area two (2) times a day. thin layer gluteal cleft      losartan-hydroCHLOROthiazide (HYZAAR) 100-25 mg per tablet Take 1 Tab by mouth daily.  metoprolol succinate (TOPROL-XL) 25 mg XL tablet TAKE 1 TABLET BY MOUTH EVERY DAY 90 Tab 3    pravastatin (PRAVACHOL) 80 mg tablet TAKE 1 TABLET BY MOUTH at bedtime 90 Tab 3    levothyroxine (SYNTHROID) 175 mcg tablet TAKE 1 TABLET BY MOUTH EVERY DAY 90 Tab 3    clopidogreL (PLAVIX) 75 mg tab TAKE 1 TABLET BY MOUTH EVERY DAY 90 Tab 3    brimonidine (ALPHAGAN) 0.15 % ophthalmic solution Administer 1 Drop to both eyes two (2) times a day.        Past Medical History:   Diagnosis Date    Diabetes ([facility] [address]     Heart failure ([facility] [address]     unknown to family    Hypertension     Stroke [facility])      Past Surgical History:   Procedure Laterality Date    HX GYN      HX HEENT      thyroidectomy    HX HYSTERECTOMY      REMOVAL GALLBLADDER      THYROIDECTOMY       No Known Allergies      REVIEW OF SYSTEMS:  General: negative for - chills or fever  ENT: negative for - headaches, nasal congestion or tinnitus  Respiratory: negative for - cough, hemoptysis, shortness of breath or wheezing  Cardiovascular : negative for - chest pain, edema, palpitations or shortness of breath  Gastrointestinal: negative for - abdominal pain, blood in stools, heartburn or nausea/vomiting  Genito-Urinary: no dysuria, trouble voiding, or hematuria  Musculoskeletal: negative for - gait disturbance, joint pain, joint stiffness or joint swelling  Neurological: no TIA or stroke symptoms  Hematologic: no bruises, no bleeding, no swollen glands  Integument: no lumps, mole changes, nail changes or rash  Endocrine: no malaise/lethargy or unexpected weight changes      Social History     Socioeconomic History    Marital status:      Spouse name: Not on file    Number of children: 1    Years of education: Not on file    Highest education level: Not on file   Occupational History    Occupation: retired   Tobacco Use    Smoking status: Never Smoker    Smokeless tobacco: Never Used   Substance and Sexual Activity    Alcohol use: No     Comment: been years    Drug use: No    Sexual activity: Not Currently     Family History   Problem Relation Age of Onset    Diabetes Father     No Known Problems Mother        OBJECTIVE:    Visit Vitals  BP (!) 133/97   Pulse 74   Temp 98.1 °F (36.7 °C) (Oral)   Resp 14   Ht 5' 3\" (1.6 m)   Wt 143 lb 1.6 oz (64.9 kg)   SpO2 98%   BMI 25.35 kg/m²     CONSTITUTIONAL: well , well nourished, appears age appropriate  EYES: perrla, eom intact  ENMT:moist mucous membranes, pharynx clear  NECK: supple. Thyroid normal,no JVD  RESPIRATORY: Chest: clear to ascultation and percussion   CARDIOVASCULAR: Heart: regular rate and rhythm  GASTROINTESTINAL: Abdomen: soft, bowel sounds active  HEMATOLOGIC: no pathological lymph nodes palpated  MUSCULOSKELETAL: Extremities: trace edema, pulse 1+   INTEGUMENT: No unusual rashes or suspicious skin lesions noted. Nails appear normal.  NEUROLOGIC: non-focal exam   MENTAL STATUS: alert and oriented, appropriate affect      ASSESSMENT:  1. Diabetic ketoacidosis without coma associated with type 1 diabetes mellitus ([facility] [address]    2. Prerenal azotemia    3.  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 Vascular dementia without behavioral disturbance ([facility] [address]    5. Bacteremia    6. Hypothyroidism due to medication    7. Essential hypertension    8. Dyslipidemia        PLAN:    1. Her DKA is resolved. Blood sugars are indeed elevated, as would be somewhat expected. This is useful information. She is currently taking Tresiba 14 units, which I will increase to 18 units and continue to titrate up based on blood sugars. 2. She is well hydrated currently. 3. Her diabetes is complicated by hypoglycemic unawareness, which creates major problems. Fortunately, hypoglycemia has not been an issue for months. 4. She does have dementia, most likely vascular, which is getting progressively worse, making diabetic control extremely difficult. 5. Her bacteremia has completely resolved and probably represented a contaminate. 6. She remains on a thyroid supplement as prescribed. 7. Blood pressure is excellent today, no adjustments are made. 8. She has a significant increased cardiovascular risk and remains on a statin. Follow-up and Dispositions    · Return in about 2 weeks (around 2020). Cheryle Connors, MD    Transitional Care Management Progress Note    Patient: Miguel Lord  : 1940  PCP: Lianne Muller MD    Date of admission: 10/31/20  Date of discharge: 20    Patient was contacted by Transitional Care Management services within two days after her discharge: Yes. This encounter and supporting documentation was reviewed if available. Medication reconciliation was performed today (2020). Assessment/Plan:   Diagnoses and all orders for this visit:    1. Diabetic ketoacidosis without coma associated with type 1 diabetes mellitus ([facility] [address]  -     [place] DISCHARGE MEDS RECONCILED W/ CURRENT OUTPATIENT MED LIST  -     DO NOT RESUSCITATE    2. Prerenal azotemia    3. 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  Vascular dementia without behavioral disturbance ([address]    5. Bacteremia  -     [place] DISCHARGE MEDS RECONCILED W/ CURRENT OUTPATIENT MED LIST  -     DO NOT RESUSCITATE    6. Hypothyroidism due to medication    7. Essential hypertension    8. Dyslipidemia    9. Hospital discharge follow-up  -     UT DISCHARGE MEDS RECONCILED W/ CURRENT OUTPATIENT MED LIST  -     DO NOT RESUSCITATE         Subjective:   Mamta Leiva is a 67 y.o. female presenting today for follow-up after being discharged from [facility]. The discharge summary was reviewed. The main problem requiring admission was resolved. Complications during admission: none    Interval history/Current status: stable    Admitting symptoms have: resolved      Medications marked \"taking\" at this time:  Home Medications    Medication Sig Start Date End Date Taking? Authorizing Provider   insulin degludec Randy Pile FlexTouch U-100) 100 unit/mL (3 mL) inpn 12 Units by SubCUTAneous route daily. 11/12/20   hCristopher Cutler MD   insulin lispro (HUMALOG) 100 unit/mL kwikpen 3 units before meals breakfast and lunch and 2 units before dinner 11/10/20   Carmita Combs MD   LEli acidoph & paracasei- S therm- Bifido (GALILEA-Q/RISAQUAD) 8 billion cell cap cap Take 1 Cap by mouth daily. 11/9/20   Carmita Combs MD   amLODIPine (NORVASC) 10 mg tablet Take 1 Tab by mouth daily. 10/5/20   Carmita Combs MD   zinc oxide 20 % ointment Apply 1 Applicator to affected area two (2) times a day. thin layer gluteal cleft    Provider, Historical   losartan-hydroCHLOROthiazide (HYZAAR) 100-25 mg per tablet Take 1 Tab by mouth daily.     Provider, Historical   metoprolol succinate (TOPROL-XL) 25 mg XL tablet TAKE 1 TABLET BY MOUTH EVERY DAY 5/11/20   Carmita Combs MD   pravastatin (PRAVACHOL) 80 mg tablet TAKE 1 TABLET BY MOUTH at bedtime 2/27/20   Carmita Combs MD   levothyroxine (SYNTHROID) 175 mcg tablet TAKE 1 TABLET BY MOUTH EVERY DAY 2/27/20   Carmita Combs MD   clopidogreL (PLAVIX) 75 mg tab TAKE 1 TABLET BY MOUTH EVERY DAY 2/27/20   Vel Valencia MD   brimonidine (ALPHAGAN) 0.15 % ophthalmic solution Administer 1 Drop to both eyes two (2) times a day. 1/30/15   Provider, Historical        Review of Systems:  14 point ROS neg       Current Outpatient Medications   Medication Sig Dispense Refill    insulin degludec Indigoyann Lloyd FlexTouch U-100) 100 unit/mL (3 mL) inpn 12 Units by SubCUTAneous route daily. 2 Adjustable Dose Pre-filled Pen Syringe 11    insulin lispro (HUMALOG) 100 unit/mL kwikpen 3 units before meals breakfast and lunch and 2 units before dinner 2 Adjustable Dose Pre-filled Pen Syringe 11    L. acidoph & paracasei- S therm- Bifido (GALILEA-Q/RISAQUAD) 8 billion cell cap cap Take 1 Cap by mouth daily. 20 Cap 0    amLODIPine (NORVASC) 10 mg tablet Take 1 Tab by mouth daily. 30 Tab 11    zinc oxide 20 % ointment Apply 1 Applicator to affected area two (2) times a day. thin layer gluteal cleft      losartan-hydroCHLOROthiazide (HYZAAR) 100-25 mg per tablet Take 1 Tab by mouth daily.  metoprolol succinate (TOPROL-XL) 25 mg XL tablet TAKE 1 TABLET BY MOUTH EVERY DAY 90 Tab 3    pravastatin (PRAVACHOL) 80 mg tablet TAKE 1 TABLET BY MOUTH at bedtime 90 Tab 3    levothyroxine (SYNTHROID) 175 mcg tablet TAKE 1 TABLET BY MOUTH EVERY DAY 90 Tab 3    clopidogreL (PLAVIX) 75 mg tab TAKE 1 TABLET BY MOUTH EVERY DAY 90 Tab 3    brimonidine (ALPHAGAN) 0.15 % ophthalmic solution Administer 1 Drop to both eyes two (2) times a day.        No Known Allergies  Past Medical History:   Diagnosis Date    Diabetes ([address]     Heart failure ([address]     unknown to family    Hypertension     Stroke [facility])      Past Surgical History:   Procedure Laterality Date    HX GYN      HX HEENT      thyroidectomy    HX HYSTERECTOMY      REMOVAL GALLBLADDER      THYROIDECTOMY       Family History   Problem Relation Age of Onset    Diabetes Father     No Known Problems Mother           Objective:   BP (!) 133/97   Pulse 74   Temp 98.1 °F (36.7 °C) (Oral)   Resp 14   Ht 5' 3\" (1.6 m)   Wt 143 lb 1.6 oz (64.9 kg)   SpO2 98%   BMI 25.35 kg/m²      Physical Examination: General appearance - alert, well appearing, and in no distress  Mental status - alert, oriented to person, place, and time  Neck - supple, no significant adenopathy  Chest - clear to auscultation, no wheezes, rales or rhonchi, symmetric air entry  Heart - normal rate, regular rhythm, normal S1, S2, no murmurs, rubs, clicks or gallops  Abdomen - soft, nontender, nondistended, no masses or organomegaly  Extremities - peripheral pulses normal, no pedal edema, no clubbing or cyanosis      We discussed the expected course, resolution and complications of the diagnosis(es) in detail. Medication risks, benefits, costs, interactions, and alternatives were discussed as indicated. I advised her to contact the office if her condition worsens, changes or fails to improve as anticipated. She expressed understanding with the diagnosis(es) and plan.      Stevenson Arrieta MD

## 2020-11-24 ENCOUNTER — HOSPITAL ENCOUNTER (EMERGENCY)
Age: 80
Discharge: HOME | End: 2020-11-24
Attending: EMERGENCY MEDICINE
Payer: MEDICARE

## 2020-11-24 ENCOUNTER — APPOINTMENT (OUTPATIENT)
Dept: GENERAL RADIOLOGY | Age: 80
End: 2020-11-24
Attending: EMERGENCY MEDICINE
Payer: MEDICARE

## 2020-11-24 VITALS
BODY MASS INDEX: 25.35 KG/M2 | OXYGEN SATURATION: 99 % | HEART RATE: 70 BPM | RESPIRATION RATE: 18 BRPM | SYSTOLIC BLOOD PRESSURE: 123 MMHG | HEIGHT: 63 IN | DIASTOLIC BLOOD PRESSURE: 59 MMHG | TEMPERATURE: 97.6 F | WEIGHT: 143.08 LBS

## 2020-11-24 DIAGNOSIS — E16.2 HYPOGLYCEMIA: Primary | ICD-10-CM

## 2020-11-24 DIAGNOSIS — E87.6 HYPOKALEMIA: ICD-10-CM
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
## 2020-11-24 DIAGNOSIS — M25.559 HIP PAIN: ICD-10-CM

## 2020-11-24 LAB
ALBUMIN SERPL-MCNC: 3.4 G/DL (ref 3.5–5)
ALBUMIN/GLOB SERPL: 0.9 {RATIO} (ref 1.1–2.2)
ALP SERPL-CCNC: 135 U/L (ref 45–117)
ALT SERPL-CCNC: 39 U/L (ref 12–78)
ANION GAP SERPL CALC-SCNC: 6 MMOL/L (ref 5–15)
APPEARANCE UR: CLEAR
AST SERPL-CCNC: 29 U/L (ref 15–37)
BACTERIA URNS QL MICRO: NEGATIVE /HPF
BASOPHILS # BLD: 0 K/UL (ref 0–0.1)
BASOPHILS NFR BLD: 0 % (ref 0–1)
BILIRUB SERPL-MCNC: 0.2 MG/DL (ref 0.2–1)
BILIRUB UR QL: NEGATIVE
BUN SERPL-MCNC: 26 MG/DL (ref 6–20)
BUN/CREAT SERPL: 33 (ref 12–20)
CALCIUM SERPL-MCNC: 9.1 MG/DL (ref 8.5–10.1)
CHLORIDE SERPL-SCNC: 106 MMOL/L (ref 97–108)
CO2 SERPL-SCNC: 27 MMOL/L (ref 21–32)
COLOR UR: ABNORMAL
CREAT SERPL-MCNC: 0.8 MG/DL (ref 0.55–1.02)
DIFFERENTIAL METHOD BLD: ABNORMAL
EOSINOPHIL # BLD: 0.1 K/UL (ref 0–0.4)
EOSINOPHIL NFR BLD: 1 % (ref 0–7)
EPITH CASTS URNS QL MICRO: ABNORMAL /LPF
ERYTHROCYTE [DISTWIDTH] IN BLOOD BY AUTOMATED COUNT: 16.2 % (ref 11.5–14.5)
GLOBULIN SER CALC-MCNC: 3.6 G/DL (ref 2–4)
GLUCOSE BLD STRIP.AUTO-MCNC: 151 MG/DL (ref 65–100)
GLUCOSE BLD STRIP.AUTO-MCNC: 168 MG/DL (ref 65–100)
GLUCOSE BLD STRIP.AUTO-MCNC: 191 MG/DL (ref 65–100)
GLUCOSE SERPL-MCNC: 147 MG/DL (ref 65–100)
GLUCOSE UR STRIP.AUTO-MCNC: NEGATIVE MG/DL
HCT VFR BLD AUTO: 29.9 % (ref 35–47)
HGB BLD-MCNC: 9.6 G/DL (ref 11.5–16)
HGB UR QL STRIP: NEGATIVE
HYALINE CASTS URNS QL MICRO: ABNORMAL /LPF (ref 0–5)
IMM GRANULOCYTES # BLD AUTO: 0 K/UL (ref 0–0.04)
IMM GRANULOCYTES NFR BLD AUTO: 0 % (ref 0–0.5)
KETONES UR QL STRIP.AUTO: NEGATIVE MG/DL
LEUKOCYTE ESTERASE UR QL STRIP.AUTO: ABNORMAL
LYMPHOCYTES # BLD: 0.6 K/UL (ref 0.8–3.5)
LYMPHOCYTES NFR BLD: 7 % (ref 12–49)
MCH RBC QN AUTO: 31.1 PG (ref 26–34)
MCHC RBC AUTO-ENTMCNC: 32.1 G/DL (ref 30–36.5)
MCV RBC AUTO: 96.8 FL (ref 80–99)
MONOCYTES # BLD: 0.6 K/UL (ref 0–1)
MONOCYTES NFR BLD: 7 % (ref 5–13)
NEUTS SEG # BLD: 6.8 K/UL (ref 1.8–8)
NEUTS SEG NFR BLD: 85 % (ref 32–75)
NITRITE UR QL STRIP.AUTO: NEGATIVE
NRBC # BLD: 0 K/UL (ref 0–0.01)
NRBC BLD-RTO: 0 PER 100 WBC
PH UR STRIP: 6 [PH] (ref 5–8)
PLATELET # BLD AUTO: 343 K/UL (ref 150–400)
PMV BLD AUTO: 9.6 FL (ref 8.9–12.9)
POTASSIUM SERPL-SCNC: 3.2 MMOL/L (ref 3.5–5.1)
PROT SERPL-MCNC: 7 G/DL (ref 6.4–8.2)
PROT UR STRIP-MCNC: NEGATIVE MG/DL
RBC # BLD AUTO: 3.09 M/UL (ref 3.8–5.2)
RBC #/AREA URNS HPF: ABNORMAL /HPF (ref 0–5)
RBC MORPH BLD: ABNORMAL
SERVICE CMNT-IMP: ABNORMAL
SODIUM SERPL-SCNC: 139 MMOL/L (ref 136–145)
SP GR UR REFRACTOMETRY: 1.01 (ref 1–1.03)
TROPONIN I SERPL-MCNC: <0.05 NG/ML
TSH SERPL DL<=0.05 MIU/L-ACNC: 3.19 UIU/ML (ref 0.36–3.74)
UROBILINOGEN UR QL STRIP.AUTO: 0.2 EU/DL (ref 0.2–1)
WBC # BLD AUTO: 8.1 K/UL (ref 3.6–11)
WBC URNS QL MICRO: ABNORMAL /HPF (ref 0–4)

## 2020-11-24 PROCEDURE — 80053 COMPREHEN METABOLIC PANEL: CPT

## 2020-11-24 PROCEDURE — 74011250637 HC RX REV CODE- 250/637: Performed by: EMERGENCY MEDICINE

## 2020-11-24 PROCEDURE — 93005 ELECTROCARDIOGRAM TRACING: CPT

## 2020-11-24 PROCEDURE — 84484 ASSAY OF TROPONIN QUANT: CPT

## 2020-11-24 PROCEDURE — 72170 X-RAY EXAM OF PELVIS: CPT

## 2020-11-24 PROCEDURE — 82962 GLUCOSE BLOOD TEST: CPT

## 2020-11-24 PROCEDURE — 99285 EMERGENCY DEPT VISIT HI MDM: CPT

## 2020-11-24 PROCEDURE — 84443 ASSAY THYROID STIM HORMONE: CPT

## 2020-11-24 PROCEDURE — 81001 URINALYSIS AUTO W/SCOPE: CPT

## 2020-11-24 PROCEDURE — 85025 COMPLETE CBC W/AUTO DIFF WBC: CPT

## 2020-11-24 PROCEDURE — 36415 COLL VENOUS BLD VENIPUNCTURE: CPT

## 2020-11-24 RX ORDER — POTASSIUM CHLORIDE 20 MEQ/1
40 TABLET, EXTENDED RELEASE ORAL
Status: COMPLETED | OUTPATIENT
Start: 2020-11-24 | End: 2020-11-24

## 2020-11-24 RX ADMIN — POTASSIUM CHLORIDE 40 MEQ: 20 TABLET, EXTENDED RELEASE ORAL at 08:57

## 2020-11-24 NOTE — ED PROVIDER NOTES
EMERGENCY DEPARTMENT HISTORY AND PHYSICAL EXAM      Date: 11/24/2020  Patient Name: Shad Walters    History of Presenting Illness     Chief Complaint   Patient presents with    Low Blood Sugar     Patient arrives by EMS from home, found unresponsive by family. EMS found patient to have BG of 60. They gave her glucagon and brought her up to 84 prior to arrival.        History Provided By: Patient    HPI: Shad Walters, 67 y.o. female presents to the ED with a history of diabetes, admissions for DKA, high blood pressure, stroke in the past, dementia, high blood pressure, hypothyroidism, full code on review of the chart here with blood sugar of 60 on arrival of EMS after daughter called 911 for fall out of bed and disorientation. Patient received oral glucose and glucagon in the field with repeat blood sugar of 82. EMS relays verbally that there were no other abnormal vital signs and after the sugar administration she noted she was feeling much better and she appeared more oriented and with it. On meeting the patient she denies any pain. The gurney was wet with urine on arrival and she was transitioned to the commode to complete voiding. She denies any chest pain, shortness of breath, headache, abdominal pain, neck pain, back pain, hip pain or extremity pain. She does not realize that she had fallen in the EMS specifically relays that she rolled from a mattress on the floor to a wooden floor surface. There are no other complaints, changes, or physical findings at this time. PCP: Chani Ivan MD    No current facility-administered medications on file prior to encounter. Current Outpatient Medications on File Prior to Encounter   Medication Sig Dispense Refill    insulin degludec Myranda [place] FlexTouch U-100) 100 unit/mL (3 mL) inpn 12 Units by SubCUTAneous route daily.  2 Adjustable Dose Pre-filled Pen Syringe 11    insulin lispro (HUMALOG) 100 unit/mL kwikpen 3 units before meals breakfast and lunch and 2 units before dinner 2 Adjustable Dose Pre-filled Pen Syringe 11    L. acidoph & paracasei- S therm- Bifido (GALILEA-Q/RISAQUAD) 8 billion cell cap cap Take 1 Cap by mouth daily. 20 Cap 0    amLODIPine (NORVASC) 10 mg tablet Take 1 Tab by mouth daily. 30 Tab 11    zinc oxide 20 % ointment Apply 1 Applicator to affected area two (2) times a day. thin layer gluteal cleft      losartan-hydroCHLOROthiazide (HYZAAR) 100-25 mg per tablet Take 1 Tab by mouth daily.  metoprolol succinate (TOPROL-XL) 25 mg XL tablet TAKE 1 TABLET BY MOUTH EVERY DAY 90 Tab 3    pravastatin (PRAVACHOL) 80 mg tablet TAKE 1 TABLET BY MOUTH at bedtime 90 Tab 3    levothyroxine (SYNTHROID) 175 mcg tablet TAKE 1 TABLET BY MOUTH EVERY DAY 90 Tab 3    clopidogreL (PLAVIX) 75 mg tab TAKE 1 TABLET BY MOUTH EVERY DAY 90 Tab 3    brimonidine (ALPHAGAN) 0.15 % ophthalmic solution Administer 1 Drop to both eyes two (2) times a day. Past History     Past Medical History:  Past Medical History:   Diagnosis Date    Diabetes [facility]     Heart failure ([facility]     unknown to family    Hypertension     Stroke [facility])        Past Surgical History:  Past Surgical History:   Procedure Laterality Date    HX GYN      HX HEENT      thyroidectomy    HX HYSTERECTOMY      REMOVAL GALLBLADDER      THYROIDECTOMY         Family History:  Family History   Problem Relation Age of Onset    Diabetes Father     No Known Problems Mother        Social History:  Social History     Tobacco Use    Smoking status: Never Smoker    Smokeless tobacco: Never Used   Substance Use Topics    Alcohol use: No     Comment: been years    Drug use: No       Allergies:  No Known Allergies      Review of Systems   Review of Systems   Constitutional: Negative for activity change and appetite change. HENT: Negative. Respiratory: Negative. Cardiovascular: Negative. Neurological: Negative for dizziness, syncope, speech difficulty and light-headedness. Hematological:        Patient confirms she is on Plavix. All other systems reviewed and are negative. Physical Exam   Physical Exam   Vital signs and nursing notes reviewed    CONSTITUTIONAL: Alert, in mild distress; well-developed; well-nourished. Sitting on bedside commode and button shirt, pleasant disposition, smiling and jovial.  HEAD:  Normocephalic, atraumatic  EYES: PERRL; EOM's intact. ENTM: Nose: no rhinorrhea; Throat: no erythema or exudate, mucous membranes moist  Neck:  Supple. trachea is midline. No midline C-spine pain. RESP: Chest clear, equal breath sounds. - W/R/R  CV: S1 and S2 WNL; No murmurs, gallops or rubs. 2+ radial and DP pulses bilaterally. Chest wall atraumatic. GI: non-distended, normal bowel sounds, abdomen soft and non-tender. No masses or organomegaly. Abdomen atraumatic, slight tenderness of the pelvis on downward palpation. : No costo-vertebral angle tenderness. BACK:  Non-tender, normal appearance, no midline spine tenderness. UPPER EXT:  Normal inspection. no joint or soft tissue swelling  LOWER EXT: No edema, no calf tenderness. No shortening or rotation of the legs, able to move legs without difficulty or pain. NEURO: Alert and oriented x3, 5/5 strength and light touch sensation intact in bilateral upper and lower extremities. SKIN: No rashes;  Warm and dry  PSYCH: Normal mood, normal affect    Diagnostic Study Results     Labs -     Recent Results (from the past 12 hour(s))   GLUCOSE, POC    Collection Time: 11/24/20  6:30 AM   Result Value Ref Range    Glucose (POC) 151 (H) 65 - 100 mg/dL    Performed by Wilver Pena RN    CBC WITH AUTOMATED DIFF    Collection Time: 11/24/20  6:34 AM   Result Value Ref Range    WBC 8.1 3.6 - 11.0 K/uL    RBC 3.09 (L) 3.80 - 5.20 M/uL    HGB 9.6 (L) 11.5 - 16.0 g/dL    HCT 29.9 (L) 35.0 - 47.0 %    MCV 96.8 80.0 - 99.0 FL    MCH 31.1 26.0 - 34.0 PG    MCHC 32.1 30.0 - 36.5 g/dL    RDW 16.2 (H) 11.5 - 14.5 %    PLATELET 343 150 - 400 K/uL    MPV 9.6 8.9 - 12.9 FL    NRBC 0.0 0  WBC    ABSOLUTE NRBC 0.00 0.00 - 0.01 K/uL    NEUTROPHILS 85 (H) 32 - 75 %    LYMPHOCYTES 7 (L) 12 - 49 %    MONOCYTES 7 5 - 13 %    EOSINOPHILS 1 0 - 7 %    BASOPHILS 0 0 - 1 %    IMMATURE GRANULOCYTES 0 0.0 - 0.5 %    ABS. NEUTROPHILS 6.8 1.8 - 8.0 K/UL    ABS. LYMPHOCYTES 0.6 (L) 0.8 - 3.5 K/UL    ABS. MONOCYTES 0.6 0.0 - 1.0 K/UL    ABS. EOSINOPHILS 0.1 0.0 - 0.4 K/UL    ABS. BASOPHILS 0.0 0.0 - 0.1 K/UL    ABS. IMM. GRANS. 0.0 0.00 - 0.04 K/UL    DF SMEAR SCANNED      RBC COMMENTS NORMOCYTIC, NORMOCHROMIC     METABOLIC PANEL, COMPREHENSIVE    Collection Time: 11/24/20  6:34 AM   Result Value Ref Range    Sodium 139 136 - 145 mmol/L    Potassium 3.2 (L) 3.5 - 5.1 mmol/L    Chloride 106 97 - 108 mmol/L    CO2 27 21 - 32 mmol/L    Anion gap 6 5 - 15 mmol/L    Glucose 147 (H) 65 - 100 mg/dL    BUN 26 (H) 6 - 20 MG/DL    Creatinine 0.80 0.55 - 1.02 MG/DL    BUN/Creatinine ratio 33 (H) 12 - 20      GFR est AA >60 >60 ml/min/1.73m2    GFR est non-AA >60 >60 ml/min/1.73m2    Calcium 9.1 8.5 - 10.1 MG/DL    Bilirubin, total 0.2 0.2 - 1.0 MG/DL    ALT (SGPT) 39 12 - 78 U/L    AST (SGOT) 29 15 - 37 U/L    Alk.  phosphatase 135 (H) 45 - 117 U/L    Protein, total 7.0 6.4 - 8.2 g/dL    Albumin 3.4 (L) 3.5 - 5.0 g/dL    Globulin 3.6 2.0 - 4.0 g/dL    A-G Ratio 0.9 (L) 1.1 - 2.2     TSH 3RD GENERATION    Collection Time: 11/24/20  6:34 AM   Result Value Ref Range    TSH 3.19 0.36 - 3.74 uIU/mL   URINALYSIS W/ RFLX MICROSCOPIC    Collection Time: 11/24/20  6:34 AM   Result Value Ref Range    Color YELLOW/STRAW      Appearance CLEAR CLEAR      Specific gravity 1.012 1.003 - 1.030      pH (UA) 6.0 5.0 - 8.0      Protein Negative NEG mg/dL    Glucose Negative NEG mg/dL    Ketone Negative NEG mg/dL    Bilirubin Negative NEG      Blood Negative NEG      Urobilinogen 0.2 0.2 - 1.0 EU/dL    Nitrites Negative NEG      Leukocyte Esterase TRACE (A) NEG      WBC 5-10 0 - 4 /hpf    RBC 0-5 0 - 5 /hpf    Epithelial cells FEW FEW /lpf    Bacteria Negative NEG /hpf    Hyaline cast 0-2 0 - 5 /lpf   TROPONIN I    Collection Time: 11/24/20  6:34 AM   Result Value Ref Range    Troponin-I, Qt. <0.05 <0.05 ng/mL   EKG, 12 LEAD, INITIAL    Collection Time: 11/24/20  6:52 AM   Result Value Ref Range    Ventricular Rate 69 BPM    Atrial Rate 69 BPM    P-R Interval 158 ms    QRS Duration 88 ms    Q-T Interval 378 ms    QTC Calculation (Bezet) 405 ms    Calculated P Axis 44 degrees    Calculated R Axis -32 degrees    Calculated T Axis 3 degrees    Diagnosis       Normal sinus rhythm  Left axis deviation  Possible Anterior infarct (cited on or before 31-OCT-2020)  When compared with ECG of 31-OCT-2020 20:47,  T wave inversion less evident in Inferior leads  QT has shortened     GLUCOSE, POC    Collection Time: 11/24/20  7:41 AM   Result Value Ref Range    Glucose (POC) 191 (H) 65 - 100 mg/dL    Performed by Esmer Sam    GLUCOSE, POC    Collection Time: 11/24/20  8:52 AM   Result Value Ref Range    Glucose (POC) 168 (H) 65 - 100 mg/dL    Performed by Farecast Draft \"Jimbo\"        Radiologic Studies -   XR PELV AP ONLY   Final Result   IMPRESSION: No acute abnormality. CT Results  (Last 48 hours)    None        CXR Results  (Last 48 hours)    None          Medical Decision Making   I am the first provider for this patient. I reviewed the vital signs, available nursing notes, past medical history, past surgical history, family history and social history. Vital Signs-Reviewed the patient's vital signs.   Patient Vitals for the past 12 hrs:   Temp Pulse Resp BP SpO2   11/24/20 0830    (!) 123/59 99 %   11/24/20 0730    (!) 122/59 96 %   11/24/20 0715    (!) 137/56 98 %   11/24/20 0700    126/70 100 %   11/24/20 0651    (!) 133/56 99 %   11/24/20 0631     99 %   11/24/20 0620 97.6 °F (36.4 °C) 70 18 (!) 138/57 99 %       EKG interpretation: (Preliminary)  EKG performed at 6:52 AM, as interpreted by me shows normal sinus rhythm at a rate of 69, left axis deviation, normal intervals, no visible acute ST changes, isolated T wave inversion in lead III only. Lani Adams MD      Records Reviewed: Nursing Notes, Old Medical Records and Ambulance Run Sheet    Provider Notes (Medical Decision Making):   52-year-old female with episode of hypoglycemia which is historically been a problem for patient now at neurological baseline without evidence of traumatic injury after falling off of mattress on the floor except for slight hip pain only elicited on physical exam.. No great distance between mattress and floor, will conservatively check labs including urine given patient with significant voiding on arrival.  Point-of-care glucose, EKG Trop. Sees Mau Lorenz at baseline. ED Course:   Initial assessment performed. The patients presenting problems have been discussed, and they are in agreement with the care plan formulated and outlined with them. I have encouraged them to ask questions as they arise throughout their visit. ED Course as of Nov 24 0915   Tue Nov 24, 2020   9255 Repeat blood sugar is 152. [TL]   [id number] Checked on patient. Awake alert oriented, stable vitals. P.o. potassium ordered. Will repeat point-of-care glucose more time if remains elevated plan for discharge. [TL]      ED Course User Index  [TL] Latrice Zuniga MD     9:14am: Reassuring repeat blood sugar, patient at neurological baseline, no new concerns, plan for discharge. Nursing staff will help call daughter for a ride home. Disposition:  Discharge    Discharge Note:  9:16 AM  The pt is ready for discharge. The pt's signs, symptoms, diagnosis, and discharge instructions have been discussed and pt has conveyed their understanding. The pt is to follow up as recommended or return to ER should their symptoms worsen. Plan has been discussed and pt is in agreement. DISCHARGE PLAN:  1.    Current Discharge Medication List        2. Follow-up Information     Follow up With Specialties Details Why Contact Info    Shakeel Baez MD Internal Medicine  As needed, If symptoms worsen including recurrent episodes of low blood sugar, blood sugars greater than 400, new chest pain, difficulty breathing or other new concerning symptoms. [address]          3. Return to ED if worse     Diagnosis     Clinical Impression:   1. Hypoglycemia    2. Hypokalemia    3. Hip pain    4. Fall, initial encounter        Attestations:    Kobe Salas MD    Please note that this dictation was completed with [employer], the [employer] voice recognition software. Quite often unanticipated grammatical, syntax, homophones, and other interpretive errors are inadvertently transcribed by the computer software. Please disregard these errors. Please excuse any errors that have escaped final proofreading. Thank you.

## 2020-11-25 ENCOUNTER — PATIENT OUTREACH (OUTPATIENT)
Dept: CASE MANAGEMENT | Age: 80
End: 2020-11-25

## 2020-11-25 LAB
ATRIAL RATE: 69 BPM
CALCULATED P AXIS, ECG09: 44 DEGREES
CALCULATED R AXIS, ECG10: -32 DEGREES
CALCULATED T AXIS, ECG11: 3 DEGREES
DIAGNOSIS, 93000: NORMAL
P-R INTERVAL, ECG05: 158 MS
Q-T INTERVAL, ECG07: 378 MS
QRS DURATION, ECG06: 88 MS
QTC CALCULATION (BEZET), ECG08: 405 MS
VENTRICULAR RATE, ECG03: 69 BPM

## 2020-11-25 NOTE — PROGRESS NOTES
Social Work Note  Follow up with Daughter  YOLANDA received notification from [facility] reporting that pt's daughter needed assistance with coordinating personal care services. YOLANDA contacted pt's daughter to inquire about services. Daughter reported that she had not heard from anyone with [address] regarding the start date for services. Follow up with personal care agency  YOLANDA contacted Nancy with [address] to inquire about start date. She reported that she had an aide go out to patient's home but no one came to the door or answered the phone. The aide went to pt's home instead of the daughter address which is where patient is currently residing. YOLANDA contacted daughter to make her aware and advised her to contact Nancy to provide address for services. Daughter understood and agreed to contact Nancy to arrange services. Follow up with [facility]  Daughter reported that she had not followed up with anyone regarding hospital bed. YOLANDA contacted [facility] and left a message with Cesar Anderson requesting assistance with ordering hospital bed for patient. [facility] is scheduled to meet with patient on 11-27-20.      Karly Vazquez, [facility] Team   [facility] Coordination Team  [phone]

## 2020-11-25 NOTE — PROGRESS NOTES
Patient contacted regarding recent discharge and COVID-19 risk. Discussed COVID-19 related testing which was not done at this time. Test results were not done. Patient informed of results, if available? no    Care Transition Nurse/ Ambulatory Care Manager/ LPN Care Coordinator contacted the family daughter who is listed on HIPPA form, by telephone to perform post discharge assessment. Verified name and  with family as identifiers. Patient has following risk factors of: diabetes. CTN/ACM/LPN reviewed discharge instructions, medical action plan and red flags related to discharge diagnosis. Reviewed and educated them on any new and changed medications related to discharge diagnosis. Advised obtaining a 90-day supply of all daily and as-needed medications. Advance Care Planning:   Does patient have an Advance Directive: healthcare decision makers on file    Education provided regarding infection prevention, and signs and symptoms of COVID-19 and when to seek medical attention with family who verbalized understanding. Discussed exposure protocols and quarantine from [address] you at higher risk for severe illness  and given an opportunity for questions and concerns. The family agrees to contact the COVID-19 hotline [phone] or PCP office for questions related to their healthcare. CTN/ACM/LPN provided contact information for future reference. From CDC: Are you at higher risk for severe illness?  Wash your hands often.  Avoid close contact (6 feet, which is about two arm lengths) with people who are sick.  Put distance between yourself and other people if COVID-19 is spreading in your community.  Clean and disinfect frequently touched surfaces.  Avoid all cruise travel and non-essential air travel.  Call your healthcare professional if you have concerns about COVID-19 and your underlying condition or if you are sick.     For more information on steps you can take to protect yourself, see CDC's How to Protect Yourself      Patient/family/caregiver given information for [employer] and agrees to enroll no      patient is currently followed by Shane Johnson who is on PTO today - ACM will send notes to [facility] for further ALEKSANDAR follow up. based on severity of symptoms and risk factors. Pts daughter reports she is doing ok today. She had a hypoglycemic event yesterday. They do not have glucose tabs or glucagon available at home. She has a history of Hypoglycemic events. Recommend they ask PCP for rx for glucagon in case of another unresponsive episode. Patients [facility] from 12/17 for sooner follow up and a message was sent to the office re this request. Patients daughter notes that [facility] care aide services she was approved for have not called back to set up her visits. ACM will out reach to  working with her for follow up re personal care services. Notes routed to  Gloria Perry for ongoing follow up.

## 2020-11-26 RX ORDER — INSULIN LISPRO 100 [IU]/ML
INJECTION, SOLUTION INTRAVENOUS; SUBCUTANEOUS
Qty: 2 ADJUSTABLE DOSE PRE-FILLED PEN SYRINGE | Refills: 11 | Status: ON HOLD | OUTPATIENT
Start: 2020-11-26 | End: 2020-12-06 | Stop reason: SDUPTHER

## 2020-11-30 ENCOUNTER — HOSPITAL ENCOUNTER (INPATIENT)
Age: 80
LOS: 6 days | Discharge: HOME HEALTH | DRG: 638 | End: 2020-12-06
Attending: EMERGENCY MEDICINE | Admitting: INTERNAL MEDICINE
Payer: MEDICARE

## 2020-11-30 ENCOUNTER — APPOINTMENT (OUTPATIENT)
Dept: GENERAL RADIOLOGY | Age: 80
DRG: 638 | End: 2020-11-30
Attending: EMERGENCY MEDICINE
Payer: MEDICARE

## 2020-11-30 DIAGNOSIS — E11.11 DIABETIC KETOACIDOSIS WITH COMA ASSOCIATED WITH TYPE 2 DIABETES MELLITUS (HCC): Primary | ICD-10-CM

## 2020-11-30 LAB
ALBUMIN SERPL-MCNC: 3.7 G/DL (ref 3.5–5)
ALBUMIN/GLOB SERPL: 0.9 {RATIO} (ref 1.1–2.2)
ALP SERPL-CCNC: 163 U/L (ref 45–117)
ALT SERPL-CCNC: 54 U/L (ref 12–78)
ANION GAP SERPL CALC-SCNC: 15 MMOL/L (ref 5–15)
ANION GAP SERPL CALC-SCNC: ABNORMAL MMOL/L (ref 5–15)
ANION GAP SERPL CALC-SCNC: ABNORMAL MMOL/L (ref 5–15)
APPEARANCE UR: CLEAR
ARTERIAL PATENCY WRIST A: ABNORMAL
AST SERPL-CCNC: 40 U/L (ref 15–37)
BACTERIA URNS QL MICRO: ABNORMAL /HPF
BASE DEFICIT BLD-SCNC: <30 MMOL/L
BASOPHILS # BLD: 0.1 K/UL (ref 0–0.1)
BASOPHILS NFR BLD: 1 % (ref 0–1)
BDY SITE: ABNORMAL
BILIRUB SERPL-MCNC: 0.6 MG/DL (ref 0.2–1)
BILIRUB UR QL: NEGATIVE
BUN SERPL-MCNC: 30 MG/DL (ref 6–20)
BUN SERPL-MCNC: 34 MG/DL (ref 6–20)
BUN SERPL-MCNC: 35 MG/DL (ref 6–20)
BUN/CREAT SERPL: 20 (ref 12–20)
BUN/CREAT SERPL: 21 (ref 12–20)
BUN/CREAT SERPL: 23 (ref 12–20)
CA-I BLD-SCNC: 1.18 MMOL/L (ref 1.12–1.32)
CALCIUM SERPL-MCNC: 8.4 MG/DL (ref 8.5–10.1)
CALCIUM SERPL-MCNC: 8.7 MG/DL (ref 8.5–10.1)
CALCIUM SERPL-MCNC: 9.4 MG/DL (ref 8.5–10.1)
CHLORIDE SERPL-SCNC: 101 MMOL/L (ref 97–108)
CHLORIDE SERPL-SCNC: 104 MMOL/L (ref 97–108)
CHLORIDE SERPL-SCNC: 114 MMOL/L (ref 97–108)
CO2 SERPL-SCNC: 13 MMOL/L (ref 21–32)
CO2 SERPL-SCNC: <5 MMOL/L (ref 21–32)
CO2 SERPL-SCNC: <5 MMOL/L (ref 21–32)
COLOR UR: YELLOW
COVID-19 RAPID TEST, COVR: NOT DETECTED
CREAT SERPL-MCNC: 1.44 MG/DL (ref 0.55–1.02)
CREAT SERPL-MCNC: 1.5 MG/DL (ref 0.55–1.02)
CREAT SERPL-MCNC: 1.71 MG/DL (ref 0.55–1.02)
DIFFERENTIAL METHOD BLD: ABNORMAL
EOSINOPHIL # BLD: 0 K/UL (ref 0–0.4)
EOSINOPHIL NFR BLD: 0 % (ref 0–7)
EPITH CASTS URNS QL MICRO: ABNORMAL /LPF
ERYTHROCYTE [DISTWIDTH] IN BLOOD BY AUTOMATED COUNT: 15.9 % (ref 11.5–14.5)
EST. AVERAGE GLUCOSE BLD GHB EST-MCNC: 229 MG/DL
FLUAV AG NPH QL IA: NEGATIVE
FLUBV AG NOSE QL IA: NEGATIVE
GAS FLOW.O2 O2 DELIVERY SYS: ABNORMAL L/MIN
GLOBULIN SER CALC-MCNC: 3.9 G/DL (ref 2–4)
GLUCOSE BLD STRIP.AUTO-MCNC: 144 MG/DL (ref 65–100)
GLUCOSE BLD STRIP.AUTO-MCNC: 189 MG/DL (ref 65–100)
GLUCOSE BLD STRIP.AUTO-MCNC: 238 MG/DL (ref 65–100)
GLUCOSE BLD STRIP.AUTO-MCNC: 301 MG/DL (ref 65–100)
GLUCOSE BLD STRIP.AUTO-MCNC: 400 MG/DL (ref 65–100)
GLUCOSE BLD STRIP.AUTO-MCNC: 498 MG/DL (ref 65–100)
GLUCOSE BLD STRIP.AUTO-MCNC: 546 MG/DL (ref 65–100)
GLUCOSE BLD STRIP.AUTO-MCNC: >600 MG/DL (ref 65–100)
GLUCOSE BLD STRIP.AUTO-MCNC: >600 MG/DL (ref 65–100)
GLUCOSE SERPL-MCNC: 305 MG/DL (ref 65–100)
GLUCOSE SERPL-MCNC: 633 MG/DL (ref 65–100)
GLUCOSE SERPL-MCNC: 695 MG/DL (ref 65–100)
GLUCOSE UR STRIP.AUTO-MCNC: NEGATIVE MG/DL
HBA1C MFR BLD: 9.6 % (ref 4–5.6)
HCO3 BLD-SCNC: 3.1 MMOL/L (ref 22–26)
HCT VFR BLD AUTO: 36.4 % (ref 35–47)
HEALTH STATUS, XMCV2T: NORMAL
HGB BLD-MCNC: 11 G/DL (ref 11.5–16)
HGB UR QL STRIP: ABNORMAL
IMM GRANULOCYTES # BLD AUTO: 0 K/UL (ref 0–0.04)
IMM GRANULOCYTES NFR BLD AUTO: 0 % (ref 0–0.5)
KETONES UR QL STRIP.AUTO: 80 MG/DL
LEUKOCYTE ESTERASE UR QL STRIP.AUTO: NEGATIVE
LYMPHOCYTES # BLD: 1.5 K/UL (ref 0.8–3.5)
LYMPHOCYTES NFR BLD: 11 % (ref 12–49)
MAGNESIUM SERPL-MCNC: 2.5 MG/DL (ref 1.6–2.4)
MCH RBC QN AUTO: 31.8 PG (ref 26–34)
MCHC RBC AUTO-ENTMCNC: 30.2 G/DL (ref 30–36.5)
MCV RBC AUTO: 105.2 FL (ref 80–99)
METAMYELOCYTES NFR BLD MANUAL: 1 %
MONOCYTES # BLD: 0.5 K/UL (ref 0–1)
MONOCYTES NFR BLD: 4 % (ref 5–13)
MUCOUS THREADS URNS QL MICRO: ABNORMAL /LPF
MYELOCYTES NFR BLD MANUAL: 1 %
NEUTS SEG # BLD: 10.9 K/UL (ref 1.8–8)
NEUTS SEG NFR BLD: 82 % (ref 32–75)
NITRITE UR QL STRIP.AUTO: NEGATIVE
NRBC # BLD: 0.02 K/UL (ref 0–0.01)
NRBC BLD-RTO: 0.2 PER 100 WBC
PCO2 BLD: 16 MMHG (ref 35–45)
PH BLD: 6.89 [PH] (ref 7.35–7.45)
PH UR STRIP: 5.5 [PH] (ref 5–8)
PHOSPHATE SERPL-MCNC: 7.2 MG/DL (ref 2.6–4.7)
PLATELET # BLD AUTO: 310 K/UL (ref 150–400)
PMV BLD AUTO: 10.4 FL (ref 8.9–12.9)
PO2 BLD: 89 MMHG (ref 80–100)
POTASSIUM SERPL-SCNC: 3.6 MMOL/L (ref 3.5–5.1)
POTASSIUM SERPL-SCNC: 5.1 MMOL/L (ref 3.5–5.1)
POTASSIUM SERPL-SCNC: 6.4 MMOL/L (ref 3.5–5.1)
PROT SERPL-MCNC: 7.6 G/DL (ref 6.4–8.2)
PROT UR STRIP-MCNC: ABNORMAL MG/DL
RBC # BLD AUTO: 3.46 M/UL (ref 3.8–5.2)
RBC #/AREA URNS HPF: ABNORMAL /HPF (ref 0–5)
RBC MORPH BLD: ABNORMAL
SAO2 % BLD: 88 % (ref 92–97)
SERVICE CMNT-IMP: ABNORMAL
SODIUM SERPL-SCNC: 131 MMOL/L (ref 136–145)
SODIUM SERPL-SCNC: 135 MMOL/L (ref 136–145)
SODIUM SERPL-SCNC: 142 MMOL/L (ref 136–145)
SOURCE, COVRS: NORMAL
SP GR UR REFRACTOMETRY: 1.02 (ref 1–1.03)
SPECIMEN SOURCE, FCOV2M: NORMAL
SPECIMEN TYPE, XMCV1T: NORMAL
SPECIMEN TYPE: ABNORMAL
UROBILINOGEN UR QL STRIP.AUTO: 0.2 EU/DL (ref 0.2–1)
WBC # BLD AUTO: 13.3 K/UL (ref 3.6–11)
WBC MORPH BLD: ABNORMAL
WBC URNS QL MICRO: ABNORMAL /HPF (ref 0–4)

## 2020-11-30 PROCEDURE — 74011000250 HC RX REV CODE- 250: Performed by: EMERGENCY MEDICINE

## 2020-11-30 PROCEDURE — 81003 URINALYSIS AUTO W/O SCOPE: CPT

## 2020-11-30 PROCEDURE — 51701 INSERT BLADDER CATHETER: CPT

## 2020-11-30 PROCEDURE — 96361 HYDRATE IV INFUSION ADD-ON: CPT

## 2020-11-30 PROCEDURE — 71045 X-RAY EXAM CHEST 1 VIEW: CPT

## 2020-11-30 PROCEDURE — 87804 INFLUENZA ASSAY W/OPTIC: CPT

## 2020-11-30 PROCEDURE — 74011000258 HC RX REV CODE- 258: Performed by: EMERGENCY MEDICINE

## 2020-11-30 PROCEDURE — 94762 N-INVAS EAR/PLS OXIMTRY CONT: CPT

## 2020-11-30 PROCEDURE — 74011250636 HC RX REV CODE- 250/636: Performed by: EMERGENCY MEDICINE

## 2020-11-30 PROCEDURE — 82962 GLUCOSE BLOOD TEST: CPT

## 2020-11-30 PROCEDURE — 87635 SARS-COV-2 COVID-19 AMP PRB: CPT

## 2020-11-30 PROCEDURE — 85025 COMPLETE CBC W/AUTO DIFF WBC: CPT

## 2020-11-30 PROCEDURE — 74011636637 HC RX REV CODE- 636/637: Performed by: EMERGENCY MEDICINE

## 2020-11-30 PROCEDURE — 96374 THER/PROPH/DIAG INJ IV PUSH: CPT

## 2020-11-30 PROCEDURE — 82803 BLOOD GASES ANY COMBINATION: CPT

## 2020-11-30 PROCEDURE — 80053 COMPREHEN METABOLIC PANEL: CPT

## 2020-11-30 PROCEDURE — 36415 COLL VENOUS BLD VENIPUNCTURE: CPT

## 2020-11-30 PROCEDURE — 80048 BASIC METABOLIC PNL TOTAL CA: CPT

## 2020-11-30 PROCEDURE — 93005 ELECTROCARDIOGRAM TRACING: CPT

## 2020-11-30 PROCEDURE — 65610000006 HC RM INTENSIVE CARE

## 2020-11-30 PROCEDURE — 83036 HEMOGLOBIN GLYCOSYLATED A1C: CPT

## 2020-11-30 PROCEDURE — 84100 ASSAY OF PHOSPHORUS: CPT

## 2020-11-30 PROCEDURE — 83735 ASSAY OF MAGNESIUM: CPT

## 2020-11-30 PROCEDURE — 99285 EMERGENCY DEPT VISIT HI MDM: CPT

## 2020-11-30 RX ORDER — POLYETHYLENE GLYCOL 3350 17 G/17G
17 POWDER, FOR SOLUTION ORAL DAILY PRN
Status: DISCONTINUED | OUTPATIENT
Start: 2020-11-30 | End: 2020-12-06 | Stop reason: HOSPADM

## 2020-11-30 RX ORDER — DEXTROSE 50 % IN WATER (D50W) INTRAVENOUS SYRINGE
25-50 AS NEEDED
Status: DISCONTINUED | OUTPATIENT
Start: 2020-11-30 | End: 2020-12-02

## 2020-11-30 RX ORDER — ACETAMINOPHEN 650 MG/1
650 SUPPOSITORY RECTAL
Status: DISCONTINUED | OUTPATIENT
Start: 2020-11-30 | End: 2020-12-06 | Stop reason: HOSPADM

## 2020-11-30 RX ORDER — SODIUM CHLORIDE 0.9 % (FLUSH) 0.9 %
5-40 SYRINGE (ML) INJECTION EVERY 8 HOURS
Status: DISCONTINUED | OUTPATIENT
Start: 2020-11-30 | End: 2020-12-06 | Stop reason: HOSPADM

## 2020-11-30 RX ORDER — INSULIN LISPRO 100 [IU]/ML
INJECTION, SOLUTION INTRAVENOUS; SUBCUTANEOUS
Status: DISCONTINUED | OUTPATIENT
Start: 2020-11-30 | End: 2020-12-02 | Stop reason: SDUPTHER

## 2020-11-30 RX ORDER — PROMETHAZINE HYDROCHLORIDE 25 MG/1
12.5 TABLET ORAL
Status: DISCONTINUED | OUTPATIENT
Start: 2020-11-30 | End: 2020-12-06 | Stop reason: HOSPADM

## 2020-11-30 RX ORDER — ACETAMINOPHEN 325 MG/1
650 TABLET ORAL
Status: DISCONTINUED | OUTPATIENT
Start: 2020-11-30 | End: 2020-12-06 | Stop reason: HOSPADM

## 2020-11-30 RX ORDER — ENOXAPARIN SODIUM 100 MG/ML
30 INJECTION SUBCUTANEOUS DAILY
Status: DISCONTINUED | OUTPATIENT
Start: 2020-12-01 | End: 2020-12-06 | Stop reason: HOSPADM

## 2020-11-30 RX ORDER — ONDANSETRON 2 MG/ML
4 INJECTION INTRAMUSCULAR; INTRAVENOUS
Status: DISCONTINUED | OUTPATIENT
Start: 2020-11-30 | End: 2020-12-06 | Stop reason: HOSPADM

## 2020-11-30 RX ORDER — SODIUM CHLORIDE 0.9 % (FLUSH) 0.9 %
5-40 SYRINGE (ML) INJECTION AS NEEDED
Status: DISCONTINUED | OUTPATIENT
Start: 2020-11-30 | End: 2020-12-06 | Stop reason: HOSPADM

## 2020-11-30 RX ORDER — ALBUTEROL SULFATE 0.83 MG/ML
2.5 SOLUTION RESPIRATORY (INHALATION) ONCE
Status: ACTIVE | OUTPATIENT
Start: 2020-11-30 | End: 2020-12-01

## 2020-11-30 RX ORDER — MAGNESIUM SULFATE 100 %
4 CRYSTALS MISCELLANEOUS AS NEEDED
Status: DISCONTINUED | OUTPATIENT
Start: 2020-11-30 | End: 2020-12-02

## 2020-11-30 RX ORDER — CALCIUM GLUCONATE 94 MG/ML
1 INJECTION, SOLUTION INTRAVENOUS
Status: COMPLETED | OUTPATIENT
Start: 2020-11-30 | End: 2020-11-30

## 2020-11-30 RX ORDER — ALBUTEROL SULFATE 90 UG/1
8 AEROSOL, METERED RESPIRATORY (INHALATION)
Status: DISCONTINUED | OUTPATIENT
Start: 2020-11-30 | End: 2020-11-30 | Stop reason: SDUPTHER

## 2020-11-30 RX ADMIN — SODIUM CHLORIDE 13.6 UNITS/HR: 9 INJECTION, SOLUTION INTRAVENOUS at 18:17

## 2020-11-30 RX ADMIN — SODIUM CHLORIDE 1000 ML: 900 INJECTION, SOLUTION INTRAVENOUS at 12:31

## 2020-11-30 RX ADMIN — Medication 10 ML: at 15:36

## 2020-11-30 RX ADMIN — Medication 10 ML: at 21:01

## 2020-11-30 RX ADMIN — SODIUM BICARBONATE: 84 INJECTION, SOLUTION INTRAVENOUS at 15:33

## 2020-11-30 RX ADMIN — HUMAN INSULIN 10 UNITS: 100 INJECTION, SOLUTION SUBCUTANEOUS at 12:34

## 2020-11-30 RX ADMIN — SODIUM CHLORIDE 1000 ML: 900 INJECTION, SOLUTION INTRAVENOUS at 13:57

## 2020-11-30 RX ADMIN — CALCIUM GLUCONATE 1 G: 98 INJECTION, SOLUTION INTRAVENOUS at 15:35

## 2020-11-30 RX ADMIN — SODIUM CHLORIDE 9.7 UNITS/HR: 9 INJECTION, SOLUTION INTRAVENOUS at 15:58

## 2020-11-30 NOTE — PROGRESS NOTES
ALEKSANDAR Plan  · Home w/ daughter and personal care aides vs. D/C to patient's home w/ personal care aides Patient has been approved for Medicaid care aides from [facility] scheduled to start today 11/30. · -Resumption of PT, OT, SN with [facility]. ( SPENSER order needed by MD)  · Family has been working on trying to obtain hospital bed,  [facility] to assess for need for hospital bed. (follow up needed). · Follow up appointments (PCP)   · 2nd IM Letter    Reason for Admission:  DKA                  RUR Score:     61 %    PCP: First and Last name:  Dr. Minda Lieberman    Name of Practice: [facility]    Are you a current patient: Yes/No: Yes   Approximate date of last visit:  11/17/20   Can you do a virtual visit with your PCP: Yes, with assistance of daughter             Resources/supports as :  Patient was recently discharged to her DTR Gretchen's home. Patient has 3 other children. Patient recently in the last few days was at her own home where her son lives with her. Top Challenges facing patient (as identified by patient/family and CM): Finances/Medication cost?   No barriers identified, patient has medicare and mediciad. Patient's preferred pharmacy is [facility].                 Transportation? Patient's daughter provides transportation. Support system or lack thereof? Patient's daughter Sheila Xavier, main caregiver and contact. Patient has 4 children                      Living arrangements? Patient was recently discharged to her daughter Nidhi's home. This past weekend patient had been staying at her own residence with her two sons. Nidhi's address is: Address: [address] [place] [address]           Self-care/ADLs/Cognition? Patient needs assistance with all aspects of ADL's including food prep and medication management.  Patient just approved for personal care aides to assist 5 days a week. Personal care aides scheduled to come out 20. Agency for personal care aides is [facility]. Patient's son who has ID assists patient with her insulin coverage when she is residing in her own home. Current Advanced Directive/Advance Care Plan:  Patient is a full code. Daughter Sheila Xavier [phone] is [facility] and emergency contact. Patient also has 3 other children LNOK. Transition of Care Plan:                Patient admitted for DKA. CM spoke with Lizette Mo patient's daughter to verify patient name, , and demographics. Patient recently discharged  from [address] [facility] for DKA. Patient was discharged to patient's daughters home after last admission. According to daughter patient has been residing in her own home since the weekend. Pt care aides scheduled to start today . Patient opened with [facility] for PT,  SN,OT. Patient daughter requested that patient have a hospital bed. Patient would need to be evaluated for hospital bed. Patient daughter Amandeep Landrum stated that patient would need to make decision on whether she would like to come to patient's daughter's home at d/c or return to her own home with care aides.         Readmission Assessment  Number of days since last admission?: 8-30 days  Previous disposition: Home with Home Health  Who is being interviewed?: Caregiver(Patient daughter Amandeep Landrum)  What was the patient's/caregiver's perception as to why they think they needed to return back to the hospital?: Not enough help at home, Other (Comment)(Patient was scheduled to have personal care aides in home to assist with ADLs)  Did you visit your Primary Care Physician after you left the hospital, before you returned this time?: Yes  Did you see a specialist, such as Cardiac, Pulmonary, Orthopedic Physician, etc. after you left the hospital?: No  Who advised the patient to return to the hospital?: [address] Staff, Caregiver  Does the patient report anything that got in the way of taking their medications?: Yes(patient needs assistance with reminders for medication management)  What reasons did they give?: Other (Comment)(patient has dementia and needs assistance with medication management)  In our efforts to provide the best possible care to you and others like you, can you think of anything that we could have done to help you after you left the hospital the first time, so that you might not have needed to return so soon?: Arrange for more help when leaving the hospital, Additional Community resources available for illness support      Care Management Interventions  PCP Verified by CM: Yes  Mode of Transport at Discharge:  Other (see comment)(patient daughter to transport at discharge)  Transition of Care Consult (CM Consult): Home Health, Discharge [address]: No  Reason Outside [place]: Patient already serviced by other home care/hospice agency  Physical Therapy Consult: No  Occupational Therapy Consult: No  Speech Therapy Consult: No  Current Support Network: Has Personal Caregivers, Lives with Caregiver  Confirm Follow Up Transport: Family  The Plan for Transition of Care is Related to the Following Treatment Goals : (Resumption of HH and personal care aides)  [address] Provided?: No  Discharge Location  Discharge Placement: Home with home health    MAJOR OconnellN, RN, BSW   Care Manager

## 2020-11-30 NOTE — PROGRESS NOTES
1830: TRANSFER - IN REPORT:    Verbal report received from VIN Herman(name) on [address]  being received from ED(unit) for routine progression of care      Report consisted of patients Situation, Background, Assessment and   Recommendations(SBAR). Information from the following report(s) SBAR and Kardex was reviewed with the receiving nurse. Opportunity for questions and clarification was provided. Spoke to Dr. King Lafleur.  Per Dr. King Lafleur, will not repeat COVID test, OK to DC Droplet Plus isolation

## 2020-11-30 NOTE — H&P
[facility]    ICU TEAM H&P Note    Name: Bhavin Wen   : 1940   MRN: [id number]   Date: 2020      Assessment/Plan:     1. DKA - BGs >600; AG 'too hi to calculate'   a. IVF resuscitation  b. Insulin gtt  c. q4h labs   d. R/o infectious etology  2. Encephalopathy, acute, metabolic   a. Currently appears to be protecting airway   3. Severe hi AG metabolic acidosis - as above   a. Bicarb gtt for now   b. VBG q4h for now   4. ONEYDA, mild - dehydration d/t DKA - last Cr was WNL @ 0.8   5. HTN - hold meds for now, add back home meds as needed/tolerated   6. Hypothyroid - synthroid   7. HyperK - IVFs, monitor   8. Dementia - h/o  9. CVAs - h/o   10. COVID - low suspicion - r/o test sent  11. SBP Goal of: > 90 mmHg  12. MAP Goal of: > 65 mmHg  13. None - For above SBP/MAP goals  14. IVFs: as above  15. Transfusion Trigger (Hgb): <7 g/dL  16. Respiratory Goals:  a. Incentive spirometry  17. Pulmonary toilet: Duo-Nebs   18. SpO2 Goal: > 92%  19. Keep K>4; Mg>2   20. PT/OT: PT consulted and on board and OT consulted and on board   21. Discussed Plan of Care/Code Status: Full Code  22. Appreciate Consultants Input   23. Discussed Care Plan with Bedside RN  24. Documentation of Current Medications  25. Further as below:    F - Feeding:  No npo  A - Analgesia: Acetaminophen  S - Sedation: N/A  T - DVT Prophylaxis: SCD's or Sequential Compression Device and Lovenox   H - Head of Bed: > 30 Degrees  U - Ulcer Prophylaxis: Not at this time   G - Glycemic Control: Insulin  S - Spontaneous Breathing Trial: N/A  B - Bowel Regimen: MiraLax  I - Indwelling Catheter:   Tubes: None  Lines: Peripheral IV  Drains: Ren Catheter  D - De-escalation of Antibiotics: n/a    Subjective:   Progress Note: 2020      Reason for ICU Admission: AMS d/t DKA     HPI: 79F w/ frequent admissions for DKA p/w AMS. She was well and in her usual state yesterday. Reportedly she did take her meds including insulin yesterday.  She was just recently in the hospital for DKA about 3 wks ago. She is unable to provide history due to confusion/AMS. Pt noted to have some hypoglycemic events at home since discharge w/ insulin dose adjustments. Reportedly no prior complaints of fevers, illness, dyspnea. Overnight Events: HPI      POD:  * No surgery found *    S/P:       Active Problem List:     Problem List  Date Reviewed: 11/22/2020          Codes Class    Vascular dementia without behavioral disturbance (HCC) (Chronic) ICD-10-CM: F01.50  ICD-9-CM: 290.40         Bacteremia ICD-10-CM: R78.81  ICD-9-CM: 790.7         Prerenal azotemia ICD-10-CM: R79.89  ICD-9-CM: 790.6         Dehydration ICD-10-CM: E86.0  ICD-9-CM: 276.51         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 ([facility] ICD-10-CM: E10.649  ICD-9-CM: 250.81         Hypoglycemia ICD-10-CM: E16.2  ICD-9-CM: 251.2         Hyperglycemia due to type 1 diabetes mellitus ([facility] ICD-10-CM: E10.65  ICD-9-CM: 250.01         Venous insufficiency ICD-10-CM: I87.2  ICD-9-CM: 459.81         Anemia ICD-10-CM: D64.9  ICD-9-CM: 285. 9         Acute metabolic encephalopathy DG-03-KN: G93.41  ICD-9-CM: 348.31         H/O: CVA (cerebrovascular accident) ICD-10-CM: Z86.73  ICD-9-CM: V12.54         DKA (diabetic ketoacidoses) (HCC) ICD-10-CM: E11.10  ICD-9-CM: 250.12         Dyslipidemia ICD-10-CM: E78.5  ICD-9-CM: 272.4         Hypertension ICD-10-CM: I10  ICD-9-CM: 401.9         Hypothyroidism ICD-10-CM: E03.9  ICD-9-CM: 244.9         Memory deficit ICD-10-CM: R41.3  ICD-9-CM: 780.93               Past Medical History:      has a past medical history of Diabetes ([facility] Heart failure ([facility] Hypertension, and Stroke ([facility] Past Surgical History:      has a past surgical history that includes hx gyn; hx hysterectomy; pr thyroidectomy; pr removal gallbladder; and hx heent. Home Medications:     Prior to Admission medications    Medication Sig Start Date End Date Taking?  Authorizing Provider amLODIPine (NORVASC) 10 mg tablet Take 1 Tab by mouth daily. 10/5/20  Yes Apolinar García MD   clopidogreL (PLAVIX) 75 mg tab TAKE 1 TABLET BY MOUTH EVERY DAY 2/27/20  Yes Apolinar García MD   insulin lispro (HUMALOG) 100 unit/mL kwikpen 3 units before meals breakfast and lunch and 2 units before dinner 11/26/20   Apolinar García MD   insulin degludec Diakaci Josephuck FlexTouch U-100) 100 unit/mL (3 mL) inpn 12 Units by SubCUTAneous route daily. 11/12/20   Anabel Mcwilliams MD   L. acidoph & paracasei- S therm- Bifido (GALILEA-Q/RISAQUAD) 8 billion cell cap cap Take 1 Cap by mouth daily. 11/9/20   Apolinar García MD   zinc oxide 20 % ointment Apply 1 Applicator to affected area two (2) times a day. thin layer gluteal cleft    Provider, Historical   losartan-hydroCHLOROthiazide (HYZAAR) 100-25 mg per tablet Take 1 Tab by mouth daily. Provider, Historical   metoprolol succinate (TOPROL-XL) 25 mg XL tablet TAKE 1 TABLET BY MOUTH EVERY DAY 5/11/20   Apolinar García MD   pravastatin (PRAVACHOL) 80 mg tablet TAKE 1 TABLET BY MOUTH at bedtime 2/27/20   Apolinar García MD   levothyroxine (SYNTHROID) 175 mcg tablet TAKE 1 TABLET BY MOUTH EVERY DAY 2/27/20   Apolinar García MD   brimonidine (ALPHAGAN) 0.15 % ophthalmic solution Administer 1 Drop to both eyes two (2) times a day. 1/30/15   Provider, Historical       Allergies/Social/Family History:     No Known Allergies   Social History     Tobacco Use    Smoking status: Never Smoker    Smokeless tobacco: Never Used   Substance Use Topics    Alcohol use: No     Comment: been years      Family History   Problem Relation Age of Onset    Diabetes Father     No Known Problems Mother        Review of Systems:     Review of systems not obtained due to patient factors.     Objective:   Vital Signs:  Visit Vitals  BP (!) 148/52 (BP 1 Location: Right arm, BP Patient Position: At rest)   Pulse (!) 105   Temp 99.6 °F (37.6 °C)   Resp 28   Ht 5' 3.5\" (1.613 m)   Wt 54.4 kg (120 lb)   SpO2 100%   BMI 20.92 kg/m²      O2 Device: Room air Temp (24hrs), Av.6 °F (37.6 °C), Min:99.6 °F (37.6 °C), Max:99.6 °F (37.6 °C)           Intake/Output:   No intake or output data in the 24 hours ending 20 1348    Physical Exam:    General:  Confused, NAD, somnolent, well noursished, well developed, appears stated age   Eyes:  Sclera anicteric. Pupils equally round and reactive to light. Mouth/Throat: Mucous membranes normal, oral pharynx clear   Neck: Supple   Lungs:   Clear to auscultation bilaterally, good effort   CV:  Regular rate and rhythm,no murmur, click, rub or gallop   Abdomen:   Soft, non-tender. bowel sounds normal. non-distended   Extremities: No cyanosis or edema   Skin: Skin color, texture, turgor normal. no acute rash or lesions   Lymph nodes: Cervical and supraclavicular normal   Musculoskeletal: No swelling or deformity   Lines/Devices:  Intact, no erythema, drainage or tenderness   Neuro/Psych: Somnolent, confused, nonfocal grossly        LABS AND  DATA: Personally reviewed  Recent Labs     20  1202   WBC 13.3*   HGB 11.0*   HCT 36.4        Recent Labs     20  1202   *   K 6.4*      CO2 <5*   BUN 34*   CREA 1.50*   *   CA 9.4     Recent Labs     20  1202   *   TP 7.6   ALB 3.7   GLOB 3.9     No results for input(s): INR, PTP, APTT, INREXT in the last 72 hours. No results for input(s): PHI, PCO2I, PO2I, FIO2I in the last 72 hours. No results for input(s): CPK, CKMB, TROIQ, BNPP in the last 72 hours. Hemodynamics:   PAP:   CO:     Wedge:   CI:     CVP:    SVR:       PVR:       Ventilator Settings:  Mode Rate Tidal Volume Pressure FiO2 PEEP                    Peak airway pressure:      Minute ventilation:          MEDS: Reviewed    Chest X-Ray:  CXR Results  (Last 48 hours)    None        ECHO: 2020  Left Ventricle  Normal cavity size and systolic function (ejection fraction normal). Mild concentric hypertrophy. The estimated EF is 60 - 65%. Wall Scoring  The left ventricular wall motion is normal.             Left Atrium  Normal cavity size. Right Ventricle  Normal cavity size and global systolic function. Right Atrium  Normal cavity size. Aortic Valve  Normal valve structure, trileaflet valve structure, no stenosis and no regurgitation. Mitral Valve  Normal valve structure, no stenosis and no regurgitation. Tricuspid Valve  Normal valve structure, no stenosis and no regurgitation. Pulmonic Valve  Pulmonic valve not well visualized, but normal doppler findings. Aorta  Normal aortic root. IVC/Hepatic Veins  Normal structure. Normal central venous pressure (0-5 mmHg); IVC diameter is less than 21 mm and collapses more than 50% with respiration. Pericardium  There is left pleural effusion. Multidisciplinary Rounds Completed: Yes    ABCDEF Bundle/Checklist Completed:  Yes    SPECIAL EQUIPMENT  None    DISPOSITION  Stay in ICU    CRITICAL CARE CONSULTANT NOTE  I had a face to face encounter with the patient, reviewed and interpreted patient data including clinical events, labs, images, vital signs, I/O's, and examined patient. I have discussed the case and the plan and management of the patient's care with the consulting services, the bedside nurses and the respiratory therapist.      NOTE OF PERSONAL INVOLVEMENT IN CARE   This patient has a high probability of imminent, clinically significant deterioration, which requires the highest level of preparedness to intervene urgently. I participated in the decision-making and personally managed or directed the management of the following life and organ supporting interventions that required my frequent assessment to treat or prevent imminent deterioration. I personally spent 36 minutes of critical care time.   This is time spent at this critically ill patient's bedside actively involved in patient care as well as the coordination of care and discussions with the patient's family. This does not include any procedural time which has been billed separately.     [facility]  11/30/2020

## 2020-11-30 NOTE — ED NOTES
Assumed care of pt via EMS stretcher. EMS reports Pt family stated pt was minimally altered this morning. EMS reported pt BS was 557 and Pt is insulin dependent and Pt last took her medication yesterday. Pt is on the monitor x 3, VSS at this time will continue to monitor     1215 Pt resting on stretcher in POC with call bell in reach. Pt remains on monitor x 3. VSS at this time. 1311 Pt Bp MAP decrease below 65, notified , will continue to monitor     1420 Pt resting on stretcher in POC with call bell in reach. Pt remains on monitor x 3. VSS at this time.          1445 Pt resting in stretcher no complaints at this time     1709 Pt resting in stretcher in POC, no complaints at this time will continue to monitor     1825 Change and clean  pt and place pt in brief and new gown

## 2020-11-30 NOTE — PROGRESS NOTES
CM placed call to patient's daughter Denise Brewster [phone] to follow up on if patient's home care aides had been to see patient and follow up if [facility] had been out to see patient. According to latest note from the [address], OT from [place] [id number] was scheduled to meet patient 11/27 to assist with ordering hospital bed for patient. Pt daughter Schuyler Gil stated that [address] were scheduled to come to patient's home today 11/30. Schuyler Gil stated that patient had been at patient's home since this weekend. Patient had orginially been discharged to patient's daughters home. Care aides from [address] are scheduled to come to patient's home 5 days a week. Patient's daughter stated that because of holiday weekend she told OT from [facility] to wait until next week to meet with patient. Patient daughter stated that it would be her mother's decision whether she came back to patient's daughters home or goes back to her home at discharge.      Bautista Wolfe, MAJORN, RN, [facility]

## 2020-11-30 NOTE — ED PROVIDER NOTES
EMERGENCY DEPARTMENT HISTORY AND PHYSICAL EXAM      Date: 11/30/2020  Patient Name: Susie Recinos    History of Presenting Illness     Chief Complaint   Patient presents with    High Blood Sugar     Pt states she falls asleep and is unable to take her medicine. FSBS 557 for EMS.  Altered mental status     Confusion at home, family called. History Provided By: EMS    HPI: Susie Recinos, 67 y.o. female with history of type 2 diabetes insulin-dependent, hypertension, prior CVA presents to the ED with cc of high blood sugar. EMS reportedly called by family who noted that patient was confused and glucose monitor read \"high\". Patient is known to our emergency department for frequent episodes of glucose issues including hyperglycemia and hypoglycemia. It sounds as if her son manages her blood glucose levels at home. No reports of recent fevers or recent illness. No reports of recent changes to medication although there is concern for noncompliance. Further information of the HPI and ROS unable to be obtained at this time given patient's altered mental status and no family at bedside. There are no other complaints, changes, or physical findings at this time. PCP: Rohini Alcaraz MD    No current facility-administered medications on file prior to encounter. Current Outpatient Medications on File Prior to Encounter   Medication Sig Dispense Refill    amLODIPine (NORVASC) 10 mg tablet Take 1 Tab by mouth daily. 30 Tab 11    clopidogreL (PLAVIX) 75 mg tab TAKE 1 TABLET BY MOUTH EVERY DAY 90 Tab 3    insulin lispro (HUMALOG) 100 unit/mL kwikpen 3 units before meals breakfast and lunch and 2 units before dinner 2 Adjustable Dose Pre-filled Pen Syringe 11    insulin degludec [place] FlexTouch U-100) 100 unit/mL (3 mL) inpn 12 Units by SubCUTAneous route daily.  2 Adjustable Dose Pre-filled Pen Syringe 11    L. acidoph & paracasei- S therm- Bifido (GALILEA-Q/RISAQUAD) 8 billion cell cap cap Take 1 Cap by mouth daily. 20 Cap 0    zinc oxide 20 % ointment Apply 1 Applicator to affected area two (2) times a day. thin layer gluteal cleft      losartan-hydroCHLOROthiazide (HYZAAR) 100-25 mg per tablet Take 1 Tab by mouth daily.  metoprolol succinate (TOPROL-XL) 25 mg XL tablet TAKE 1 TABLET BY MOUTH EVERY DAY 90 Tab 3    pravastatin (PRAVACHOL) 80 mg tablet TAKE 1 TABLET BY MOUTH at bedtime 90 Tab 3    levothyroxine (SYNTHROID) 175 mcg tablet TAKE 1 TABLET BY MOUTH EVERY DAY 90 Tab 3    brimonidine (ALPHAGAN) 0.15 % ophthalmic solution Administer 1 Drop to both eyes two (2) times a day. Past History     Past Medical History:  Past Medical History:   Diagnosis Date    Diabetes ([facility] [facility]     Heart failure ([facility] [facility]     unknown to family    Hypertension     Stroke [facility])        Past Surgical History:  Past Surgical History:   Procedure Laterality Date    HX GYN      HX HEENT      thyroidectomy    HX HYSTERECTOMY      REMOVAL GALLBLADDER      THYROIDECTOMY         Family History:  Family History   Problem Relation Age of Onset    Diabetes Father     No Known Problems Mother        Social History:  Social History     Tobacco Use    Smoking status: Never Smoker    Smokeless tobacco: Never Used   Substance Use Topics    Alcohol use: No     Comment: been years    Drug use: No       Allergies:  No Known Allergies      Review of Systems   Review of Systems   Unable to perform ROS: Mental status change       Physical Exam   Physical Exam  Vitals signs and nursing note reviewed. Constitutional:       Appearance: She is ill-appearing and toxic-appearing. Comments: Thin body habitus   HENT:      Head: Normocephalic and atraumatic. Nose: Nose normal.      Mouth/Throat:      Mouth: Mucous membranes are moist.   Eyes:      Extraocular Movements: Extraocular movements intact. Pupils: Pupils are equal, round, and reactive to light.    Neck:      Musculoskeletal: Normal range of motion and neck supple. Cardiovascular:      Rate and Rhythm: Regular rhythm. Tachycardia present. Heart sounds: No murmur. Pulmonary:      Effort: Respiratory distress present. Breath sounds: Normal breath sounds. No wheezing. Abdominal:      General: There is no distension. Palpations: Abdomen is soft. Tenderness: There is no abdominal tenderness. There is no guarding or rebound. Musculoskeletal: Normal range of motion. General: No swelling or tenderness. Right lower leg: No edema. Left lower leg: No edema. Skin:     General: Skin is warm and dry. Coloration: Skin is not pale. Findings: No erythema. Neurological:      General: No focal deficit present. Comments: Patient somnolent but will awaken to voice. Intermittently will respond to commands but not fully able to participate with exam.  Moves all extremities. Diagnostic Study Results     Labs -     Recent Results (from the past 12 hour(s))   GLUCOSE, POC    Collection Time: 11/30/20 10:22 AM   Result Value Ref Range    Glucose (POC) >600 (HH) 65 - 100 mg/dL    Performed by Nancy Aly    CBC WITH AUTOMATED DIFF    Collection Time: 11/30/20 12:02 PM   Result Value Ref Range    WBC 13.3 (H) 3.6 - 11.0 K/uL    RBC 3.46 (L) 3.80 - 5.20 M/uL    HGB 11.0 (L) 11.5 - 16.0 g/dL    HCT 36.4 35.0 - 47.0 %    .2 (H) 80.0 - 99.0 FL    MCH 31.8 26.0 - 34.0 PG    MCHC 30.2 30.0 - 36.5 g/dL    RDW 15.9 (H) 11.5 - 14.5 %    PLATELET 632 804 - 628 K/uL    MPV 10.4 8.9 - 12.9 FL    NRBC 0.2 (H) 0  WBC    ABSOLUTE NRBC 0.02 (H) 0.00 - 0.01 K/uL    NEUTROPHILS 82 (H) 32 - 75 %    LYMPHOCYTES 11 (L) 12 - 49 %    MONOCYTES 4 (L) 5 - 13 %    EOSINOPHILS 0 0 - 7 %    BASOPHILS 1 0 - 1 %    METAMYELOCYTES 1 %    MYELOCYTES 1 %    IMMATURE GRANULOCYTES 0 0.0 - 0.5 %    ABS. NEUTROPHILS 10.9 (H) 1.8 - 8.0 K/UL    ABS. LYMPHOCYTES 1.5 0.8 - 3.5 K/UL    ABS. MONOCYTES 0.5 0.0 - 1.0 K/UL    ABS.  EOSINOPHILS 0.0 0.0 - 0.4 K/UL    ABS. BASOPHILS 0.1 0.0 - 0.1 K/UL    ABS. IMM. GRANS. 0.0 0.00 - 0.04 K/UL    DF MANUAL      RBC COMMENTS MACROCYTOSIS  1+        WBC COMMENTS TOXIC GRANULATION     METABOLIC PANEL, COMPREHENSIVE    Collection Time: 11/30/20 12:02 PM   Result Value Ref Range    Sodium 131 (L) 136 - 145 mmol/L    Potassium 6.4 (H) 3.5 - 5.1 mmol/L    Chloride 101 97 - 108 mmol/L    CO2 <5 (LL) 21 - 32 mmol/L    Anion gap Cannot be calculated 5 - 15 mmol/L    Glucose 695 (HH) 65 - 100 mg/dL    BUN 34 (H) 6 - 20 MG/DL    Creatinine 1.50 (H) 0.55 - 1.02 MG/DL    BUN/Creatinine ratio 23 (H) 12 - 20      GFR est AA 41 (L) >60 ml/min/1.73m2    GFR est non-AA 34 (L) >60 ml/min/1.73m2    Calcium 9.4 8.5 - 10.1 MG/DL    Bilirubin, total 0.6 0.2 - 1.0 MG/DL    ALT (SGPT) 54 12 - 78 U/L    AST (SGOT) 40 (H) 15 - 37 U/L    Alk. phosphatase 163 (H) 45 - 117 U/L    Protein, total 7.6 6.4 - 8.2 g/dL    Albumin 3.7 3.5 - 5.0 g/dL    Globulin 3.9 2.0 - 4.0 g/dL    A-G Ratio 0.9 (L) 1.1 - 2.2     EKG, 12 LEAD, INITIAL    Collection Time: 11/30/20  1:30 PM   Result Value Ref Range    Ventricular Rate 106 BPM    Atrial Rate 106 BPM    P-R Interval 148 ms    QRS Duration 88 ms    Q-T Interval 354 ms    QTC Calculation (Bezet) 470 ms    Calculated P Axis 46 degrees    Calculated R Axis -32 degrees    Calculated T Axis 62 degrees    Diagnosis       Sinus tachycardia  Left axis deviation  Cannot rule out Anterior infarct (cited on or before 30-NOV-2020)  When compared with ECG of 24-NOV-2020 06:52,  Vent.  rate has increased BY  37 BPM  ST now depressed in Lateral leads  QT has lengthened     POC EG7    Collection Time: 11/30/20  1:41 PM   Result Value Ref Range    Calcium, ionized (POC) 1.18 1.12 - 1.32 mmol/L    pH (POC) 6.89 (LL) 7.35 - 7.45      pCO2 (POC) 16.0 (L) 35.0 - 45.0 MMHG    pO2 (POC) 89 80 - 100 MMHG    HCO3 (POC) 3.1 (L) 22 - 26 MMOL/L    Base deficit (POC) <30 mmol/L    sO2 (POC) 88 (L) 92 - 97 %    Site OTHER Device: ROOM AIR      Allens test (POC) N/A      Specimen type (POC) VENOUS BLOOD     METABOLIC PANEL, BASIC    Collection Time: 11/30/20  1:51 PM   Result Value Ref Range    Sodium 135 (L) 136 - 145 mmol/L    Potassium 5.1 3.5 - 5.1 mmol/L    Chloride 104 97 - 108 mmol/L    CO2 <5 (LL) 21 - 32 mmol/L    Anion gap Cannot be calculated 5 - 15 mmol/L    Glucose 633 (HH) 65 - 100 mg/dL    BUN 35 (H) 6 - 20 MG/DL    Creatinine 1.71 (H) 0.55 - 1.02 MG/DL    BUN/Creatinine ratio 20 12 - 20      GFR est AA 35 (L) >60 ml/min/1.73m2    GFR est non-AA 29 (L) >60 ml/min/1.73m2    Calcium 8.7 8.5 - 10.1 MG/DL   MAGNESIUM    Collection Time: 11/30/20  1:51 PM   Result Value Ref Range    Magnesium 2.5 (H) 1.6 - 2.4 mg/dL   PHOSPHORUS    Collection Time: 11/30/20  1:51 PM   Result Value Ref Range    Phosphorus 7.2 (H) 2.6 - 4.7 MG/DL   GLUCOSE, POC    Collection Time: 11/30/20  2:05 PM   Result Value Ref Range    Glucose (POC) >600 (HH) 65 - 100 mg/dL    Performed by [facility]        Radiologic Studies -   XR CHEST PORT   Final Result   IMPRESSION:       No acute process. CT Results  (Last 48 hours)    None        CXR Results  (Last 48 hours)               11/30/20 1408  XR CHEST PORT Final result    Impression:  IMPRESSION:        No acute process. Narrative:  EXAM:  XR CHEST PORT       INDICATION:  DKA       COMPARISON:  11/5/2020       FINDINGS:       A portable AP radiograph of the chest was obtained at 13:59 hours. The patient   is on a cardiac monitor. The lungs are clear. There is unchanged mild   enlargement of the cardiac silhouette but no vascular congestion or pleural   fluid. Previously demonstrated left pleural effusion has resolved. The bones   and soft tissues are unremarkable. Medical Decision Making   I am the first provider for this patient.     I reviewed the vital signs, available nursing notes, past medical history, past surgical history, family history and social history. Vital Signs-Reviewed the patient's vital signs. Patient Vitals for the past 12 hrs:   Temp Pulse Resp BP SpO2   11/30/20 1403  (!) 103 29 (!) 107/35    11/30/20 1400  (!) 104 (!) 31 (!) 109/32    11/30/20 1245  (!) 106 27 (!) 129/95 100 %   11/30/20 1215  (!) 106 24 (!) 138/50 100 %   11/30/20 1045  (!) 102 25 (!) 133/46 100 %   11/30/20 1020 99.6 °F (37.6 °C) (!) 105 28 (!) 148/52 100 %       Records Reviewed: Nursing Notes, Old Medical Records, Previous Radiology Studies and Previous Laboratory Studies  I personally reviewed PCP records from 11/17/2020 as well as discharge summary records from 10/25/2020    Provider Notes (Medical Decision Making):   67-year-old female here with hyperglycemia and DKA. She appears clinically ill on my evaluation with tachycardia, tachypnea, Kusmal respirations. Blood glucose greater than 600 upon arrival.  Chemistry concerning for severe DKA with anion gap too high to calculate, serum bicarb less than 5. She does have hyperkalemia however it is hemolyzed, will resend BMP and continue treating with IV insulin, albuterol, and calcium gluconate. Patient treated with 10 units bolus IV insulin and I will initiate continuous insulin infusion. We will further evaluate with chest x-ray, urinalysis, SARS-CoV-2 swab to rule out signs of infection. ED Course:   Initial assessment performed. The patients presenting problems have been discussed, and they are in agreement with the care plan formulated and outlined with them. I have encouraged them to ask questions as they arise throughout their visit. ED Course as of Nov 30 1536   Mon Nov 30, 2020   1326 Spoke with Dr. Kaye Anderson, intensivist, who will evaluate patient for admission.   Recommends initiating bicarb infusion.    [AK]      ED Course User Index  [AK] Sabrina Yuen MD         Admission Note:  Patient is being admitted to the hospital by Dr. Elva Trotter, Service: Intensivist.  The results of their tests and reasons for their admission have been discussed with them and available family. They convey agreement and understanding for the need to be admitted and for their admission diagnosis. Critical Care Documentation  I have spent 82 minutes of critical care time in evaluating and treating this patient. This includes time spent at bedside, time with family and decision makers, documentation, review of labs and imaging, and/or consultation with specialists. It does not include time spent on separately billed procedures. This patient presents with a critical illness or injury that acutely impairs one or more vital organ systems such that there is a high probability of imminent or life threatening deterioration in the patient's condition. This case involved decision making of high complexity to assess, manipulate, and support vital organ system failure and/or to prevent further life threatening deterioration of the patient's condition. Failure to initiate these interventions on an urgent basis would likely result in sudden, clinically significant or life threatening deterioration in the patient's condition. This case required my direct attention, intervention, and personal management for the time documented above. This time does not include separately billed interventions/procedures which are separately documented. Abnormal findings supporting critical care: Severe DKA resulting in severe acidosis, ONEYDA, electrolyte abnormality, and encephalopathy  Interventions to support critical care: Initiation of IV bolus insulin, IV continuous insulin infusion, treatment for hyperkalemia including albuterol, calcium gluconate, and insulin. Multiple IV fluid boluses to treat dehydration and ONEYDA. Multiple reassessment of airway.   Failure to intervene may result in: Loss of airway, worsening endocrine emergency, severe electrolyte derangement, cardiac arrhythmia, worsening dehydration, worsening renal dysfunction, CNS depression, respiratory failure, death      Disposition:  Admit to ICU      Diagnosis     Clinical Impression:   1. Diabetic ketoacidosis with coma associated with type 2 diabetes mellitus ([facility] [address]        Attestations:    Hope Schirmer, MD    Please note that this dictation was completed with goAct, the computer voice recognition software. Quite often unanticipated grammatical, syntax, homophones, and other interpretive errors are inadvertently transcribed by the computer software. Please disregard these errors. Please excuse any errors that have escaped final proofreading. Thank you.

## 2020-12-01 LAB
ALBUMIN SERPL-MCNC: 2.9 G/DL (ref 3.5–5)
ALBUMIN/GLOB SERPL: 1 {RATIO} (ref 1.1–2.2)
ALP SERPL-CCNC: 113 U/L (ref 45–117)
ALT SERPL-CCNC: 39 U/L (ref 12–78)
ANION GAP SERPL CALC-SCNC: 6 MMOL/L (ref 5–15)
ANION GAP SERPL CALC-SCNC: 6 MMOL/L (ref 5–15)
ANION GAP SERPL CALC-SCNC: 7 MMOL/L (ref 5–15)
ANION GAP SERPL CALC-SCNC: 8 MMOL/L (ref 5–15)
AST SERPL-CCNC: 20 U/L (ref 15–37)
ATRIAL RATE: 106 BPM
BASE DEFICIT BLDV-SCNC: 2.8 MMOL/L
BASE DEFICIT BLDV-SCNC: 4 MMOL/L
BDY SITE: ABNORMAL
BDY SITE: ABNORMAL
BILIRUB DIRECT SERPL-MCNC: 0.1 MG/DL (ref 0–0.2)
BILIRUB SERPL-MCNC: 0.2 MG/DL (ref 0.2–1)
BUN SERPL-MCNC: 24 MG/DL (ref 6–20)
BUN SERPL-MCNC: 24 MG/DL (ref 6–20)
BUN SERPL-MCNC: 28 MG/DL (ref 6–20)
BUN SERPL-MCNC: 29 MG/DL (ref 6–20)
BUN/CREAT SERPL: 26 (ref 12–20)
BUN/CREAT SERPL: 27 (ref 12–20)
BUN/CREAT SERPL: 27 (ref 12–20)
BUN/CREAT SERPL: 29 (ref 12–20)
CALCIUM SERPL-MCNC: 7.6 MG/DL (ref 8.5–10.1)
CALCIUM SERPL-MCNC: 8 MG/DL (ref 8.5–10.1)
CALCIUM SERPL-MCNC: 8.4 MG/DL (ref 8.5–10.1)
CALCIUM SERPL-MCNC: 8.7 MG/DL (ref 8.5–10.1)
CALCULATED P AXIS, ECG09: 46 DEGREES
CALCULATED R AXIS, ECG10: -32 DEGREES
CALCULATED T AXIS, ECG11: 62 DEGREES
CHLORIDE SERPL-SCNC: 111 MMOL/L (ref 97–108)
CHLORIDE SERPL-SCNC: 114 MMOL/L (ref 97–108)
CHLORIDE SERPL-SCNC: 115 MMOL/L (ref 97–108)
CHLORIDE SERPL-SCNC: 116 MMOL/L (ref 97–108)
CO2 SERPL-SCNC: 20 MMOL/L (ref 21–32)
CO2 SERPL-SCNC: 22 MMOL/L (ref 21–32)
CO2 SERPL-SCNC: 24 MMOL/L (ref 21–32)
CO2 SERPL-SCNC: 24 MMOL/L (ref 21–32)
CREAT SERPL-MCNC: 0.83 MG/DL (ref 0.55–1.02)
CREAT SERPL-MCNC: 0.92 MG/DL (ref 0.55–1.02)
CREAT SERPL-MCNC: 1.04 MG/DL (ref 0.55–1.02)
CREAT SERPL-MCNC: 1.08 MG/DL (ref 0.55–1.02)
DIAGNOSIS, 93000: NORMAL
ERYTHROCYTE [DISTWIDTH] IN BLOOD BY AUTOMATED COUNT: 15.3 % (ref 11.5–14.5)
GLOBULIN SER CALC-MCNC: 3 G/DL (ref 2–4)
GLUCOSE BLD STRIP.AUTO-MCNC: 103 MG/DL (ref 65–100)
GLUCOSE BLD STRIP.AUTO-MCNC: 104 MG/DL (ref 65–100)
GLUCOSE BLD STRIP.AUTO-MCNC: 105 MG/DL (ref 65–100)
GLUCOSE BLD STRIP.AUTO-MCNC: 108 MG/DL (ref 65–100)
GLUCOSE BLD STRIP.AUTO-MCNC: 109 MG/DL (ref 65–100)
GLUCOSE BLD STRIP.AUTO-MCNC: 133 MG/DL (ref 65–100)
GLUCOSE BLD STRIP.AUTO-MCNC: 142 MG/DL (ref 65–100)
GLUCOSE BLD STRIP.AUTO-MCNC: 146 MG/DL (ref 65–100)
GLUCOSE BLD STRIP.AUTO-MCNC: 151 MG/DL (ref 65–100)
GLUCOSE BLD STRIP.AUTO-MCNC: 180 MG/DL (ref 65–100)
GLUCOSE BLD STRIP.AUTO-MCNC: 195 MG/DL (ref 65–100)
GLUCOSE BLD STRIP.AUTO-MCNC: 271 MG/DL (ref 65–100)
GLUCOSE BLD STRIP.AUTO-MCNC: 47 MG/DL (ref 65–100)
GLUCOSE BLD STRIP.AUTO-MCNC: 48 MG/DL (ref 65–100)
GLUCOSE BLD STRIP.AUTO-MCNC: 53 MG/DL (ref 65–100)
GLUCOSE BLD STRIP.AUTO-MCNC: 54 MG/DL (ref 65–100)
GLUCOSE BLD STRIP.AUTO-MCNC: 60 MG/DL (ref 65–100)
GLUCOSE BLD STRIP.AUTO-MCNC: 64 MG/DL (ref 65–100)
GLUCOSE BLD STRIP.AUTO-MCNC: 72 MG/DL (ref 65–100)
GLUCOSE BLD STRIP.AUTO-MCNC: 73 MG/DL (ref 65–100)
GLUCOSE BLD STRIP.AUTO-MCNC: 85 MG/DL (ref 65–100)
GLUCOSE BLD STRIP.AUTO-MCNC: 96 MG/DL (ref 65–100)
GLUCOSE BLD STRIP.AUTO-MCNC: 98 MG/DL (ref 65–100)
GLUCOSE BLD STRIP.AUTO-MCNC: 99 MG/DL (ref 65–100)
GLUCOSE BLD STRIP.AUTO-MCNC: 99 MG/DL (ref 65–100)
GLUCOSE SERPL-MCNC: 101 MG/DL (ref 65–100)
GLUCOSE SERPL-MCNC: 150 MG/DL (ref 65–100)
GLUCOSE SERPL-MCNC: 179 MG/DL (ref 65–100)
GLUCOSE SERPL-MCNC: 75 MG/DL (ref 65–100)
HCO3 BLDV-SCNC: 20 MMOL/L (ref 23–28)
HCO3 BLDV-SCNC: 22 MMOL/L (ref 23–28)
HCT VFR BLD AUTO: 28.2 % (ref 35–47)
HGB BLD-MCNC: 9.5 G/DL (ref 11.5–16)
LACTATE SERPL-SCNC: 1.5 MMOL/L (ref 0.4–2)
MAGNESIUM SERPL-MCNC: 1.6 MG/DL (ref 1.6–2.4)
MAGNESIUM SERPL-MCNC: 1.8 MG/DL (ref 1.6–2.4)
MAGNESIUM SERPL-MCNC: 1.9 MG/DL (ref 1.6–2.4)
MAGNESIUM SERPL-MCNC: 2 MG/DL (ref 1.6–2.4)
MCH RBC QN AUTO: 31.7 PG (ref 26–34)
MCHC RBC AUTO-ENTMCNC: 33.7 G/DL (ref 30–36.5)
MCV RBC AUTO: 94 FL (ref 80–99)
NRBC # BLD: 0 K/UL (ref 0–0.01)
NRBC BLD-RTO: 0 PER 100 WBC
P-R INTERVAL, ECG05: 148 MS
PCO2 BLDV: 35 MMHG (ref 41–51)
PCO2 BLDV: 40 MMHG (ref 41–51)
PH BLDV: 7.37 [PH] (ref 7.32–7.42)
PH BLDV: 7.38 [PH] (ref 7.32–7.42)
PHOSPHATE SERPL-MCNC: 1.9 MG/DL (ref 2.6–4.7)
PLATELET # BLD AUTO: 233 K/UL (ref 150–400)
PMV BLD AUTO: 9.7 FL (ref 8.9–12.9)
PO2 BLDV: 34 MMHG (ref 25–40)
PO2 BLDV: 37 MMHG (ref 25–40)
POTASSIUM SERPL-SCNC: 2.9 MMOL/L (ref 3.5–5.1)
POTASSIUM SERPL-SCNC: 3.1 MMOL/L (ref 3.5–5.1)
POTASSIUM SERPL-SCNC: 3.4 MMOL/L (ref 3.5–5.1)
POTASSIUM SERPL-SCNC: 3.7 MMOL/L (ref 3.5–5.1)
PROT SERPL-MCNC: 5.9 G/DL (ref 6.4–8.2)
Q-T INTERVAL, ECG07: 354 MS
QRS DURATION, ECG06: 88 MS
QTC CALCULATION (BEZET), ECG08: 470 MS
RBC # BLD AUTO: 3 M/UL (ref 3.8–5.2)
SAO2 % BLDV: 63 % (ref 65–88)
SAO2 % BLDV: 69 % (ref 65–88)
SERVICE CMNT-IMP: ABNORMAL
SERVICE CMNT-IMP: NORMAL
SODIUM SERPL-SCNC: 138 MMOL/L (ref 136–145)
SODIUM SERPL-SCNC: 142 MMOL/L (ref 136–145)
SODIUM SERPL-SCNC: 146 MMOL/L (ref 136–145)
SODIUM SERPL-SCNC: 147 MMOL/L (ref 136–145)
VENTRICULAR RATE, ECG03: 106 BPM
WBC # BLD AUTO: 12.1 K/UL (ref 3.6–11)

## 2020-12-01 PROCEDURE — 74011000258 HC RX REV CODE- 258: Performed by: INTERNAL MEDICINE

## 2020-12-01 PROCEDURE — 74011250637 HC RX REV CODE- 250/637: Performed by: INTERNAL MEDICINE

## 2020-12-01 PROCEDURE — 83735 ASSAY OF MAGNESIUM: CPT

## 2020-12-01 PROCEDURE — 85027 COMPLETE CBC AUTOMATED: CPT

## 2020-12-01 PROCEDURE — 74011250636 HC RX REV CODE- 250/636: Performed by: INTERNAL MEDICINE

## 2020-12-01 PROCEDURE — 65610000006 HC RM INTENSIVE CARE

## 2020-12-01 PROCEDURE — 82962 GLUCOSE BLOOD TEST: CPT

## 2020-12-01 PROCEDURE — 83605 ASSAY OF LACTIC ACID: CPT

## 2020-12-01 PROCEDURE — 51798 US URINE CAPACITY MEASURE: CPT

## 2020-12-01 PROCEDURE — 80076 HEPATIC FUNCTION PANEL: CPT

## 2020-12-01 PROCEDURE — C1751 CATH, INF, PER/CENT/MIDLINE: HCPCS

## 2020-12-01 PROCEDURE — 77030005513 HC CATH URETH FOL11 MDII -B

## 2020-12-01 PROCEDURE — 74011250637 HC RX REV CODE- 250/637: Performed by: NURSE PRACTITIONER

## 2020-12-01 PROCEDURE — 74011250636 HC RX REV CODE- 250/636: Performed by: NURSE PRACTITIONER

## 2020-12-01 PROCEDURE — 74011000250 HC RX REV CODE- 250: Performed by: EMERGENCY MEDICINE

## 2020-12-01 PROCEDURE — 74011000250 HC RX REV CODE- 250: Performed by: NURSE PRACTITIONER

## 2020-12-01 PROCEDURE — 76937 US GUIDE VASCULAR ACCESS: CPT

## 2020-12-01 PROCEDURE — 82803 BLOOD GASES ANY COMBINATION: CPT

## 2020-12-01 PROCEDURE — 77030018786 HC NDL GD F/USND BARD -B

## 2020-12-01 PROCEDURE — 80048 BASIC METABOLIC PNL TOTAL CA: CPT

## 2020-12-01 PROCEDURE — 74011636637 HC RX REV CODE- 636/637: Performed by: INTERNAL MEDICINE

## 2020-12-01 PROCEDURE — 36415 COLL VENOUS BLD VENIPUNCTURE: CPT

## 2020-12-01 PROCEDURE — 84100 ASSAY OF PHOSPHORUS: CPT

## 2020-12-01 RX ORDER — CLOPIDOGREL BISULFATE 75 MG/1
75 TABLET ORAL DAILY
Status: DISCONTINUED | OUTPATIENT
Start: 2020-12-01 | End: 2020-12-06 | Stop reason: HOSPADM

## 2020-12-01 RX ORDER — INSULIN GLARGINE 100 [IU]/ML
10 INJECTION, SOLUTION SUBCUTANEOUS
Status: DISCONTINUED | OUTPATIENT
Start: 2020-12-01 | End: 2020-12-02

## 2020-12-01 RX ORDER — METOPROLOL TARTRATE 25 MG/1
12.5 TABLET, FILM COATED ORAL EVERY 12 HOURS
Status: DISCONTINUED | OUTPATIENT
Start: 2020-12-01 | End: 2020-12-06 | Stop reason: HOSPADM

## 2020-12-01 RX ORDER — DEXTROSE MONOHYDRATE AND SODIUM CHLORIDE 5; .45 G/100ML; G/100ML
100 INJECTION, SOLUTION INTRAVENOUS CONTINUOUS
Status: DISCONTINUED | OUTPATIENT
Start: 2020-12-01 | End: 2020-12-01

## 2020-12-01 RX ORDER — INSULIN LISPRO 100 [IU]/ML
INJECTION, SOLUTION INTRAVENOUS; SUBCUTANEOUS
Status: DISCONTINUED | OUTPATIENT
Start: 2020-12-01 | End: 2020-12-02

## 2020-12-01 RX ORDER — POTASSIUM CHLORIDE 7.45 MG/ML
10 INJECTION INTRAVENOUS
Status: ACTIVE | OUTPATIENT
Start: 2020-12-01 | End: 2020-12-01

## 2020-12-01 RX ADMIN — INSULIN GLARGINE 10 UNITS: 100 INJECTION, SOLUTION SUBCUTANEOUS at 16:02

## 2020-12-01 RX ADMIN — CLOPIDOGREL BISULFATE 75 MG: 75 TABLET ORAL at 11:59

## 2020-12-01 RX ADMIN — DEXTROSE MONOHYDRATE 7 G: 25 INJECTION, SOLUTION INTRAVENOUS at 05:22

## 2020-12-01 RX ADMIN — DEXTROSE MONOHYDRATE 11 ML: 25 INJECTION, SOLUTION INTRAVENOUS at 07:47

## 2020-12-01 RX ADMIN — DEXTROSE MONOHYDRATE AND SODIUM CHLORIDE 100 ML/HR: 5; .45 INJECTION, SOLUTION INTRAVENOUS at 08:49

## 2020-12-01 RX ADMIN — POTASSIUM BICARBONATE 40 MEQ: 782 TABLET, EFFERVESCENT ORAL at 18:27

## 2020-12-01 RX ADMIN — DEXTROSE MONOHYDRATE 12.5 G: 25 INJECTION, SOLUTION INTRAVENOUS at 23:23

## 2020-12-01 RX ADMIN — Medication 10 ML: at 21:29

## 2020-12-01 RX ADMIN — ENOXAPARIN SODIUM 30 MG: 30 INJECTION SUBCUTANEOUS at 09:04

## 2020-12-01 RX ADMIN — LEVOTHYROXINE SODIUM 175 MCG: 0.03 TABLET ORAL at 11:59

## 2020-12-01 RX ADMIN — DEXTROSE MONOHYDRATE 10.5 G: 25 INJECTION, SOLUTION INTRAVENOUS at 01:52

## 2020-12-01 RX ADMIN — POTASSIUM BICARBONATE 40 MEQ: 782 TABLET, EFFERVESCENT ORAL at 06:36

## 2020-12-01 RX ADMIN — DEXTROSE MONOHYDRATE 8.5 G: 25 INJECTION, SOLUTION INTRAVENOUS at 01:35

## 2020-12-01 RX ADMIN — Medication 10 ML: at 05:25

## 2020-12-01 RX ADMIN — POTASSIUM BICARBONATE 40 MEQ: 782 TABLET, EFFERVESCENT ORAL at 07:54

## 2020-12-01 RX ADMIN — Medication 10 ML: at 13:11

## 2020-12-01 RX ADMIN — DEXTROSE MONOHYDRATE 10.5 G: 25 INJECTION, SOLUTION INTRAVENOUS at 01:58

## 2020-12-01 RX ADMIN — METOPROLOL TARTRATE 12.5 MG: 25 TABLET, FILM COATED ORAL at 20:02

## 2020-12-01 RX ADMIN — SODIUM BICARBONATE: 84 INJECTION, SOLUTION INTRAVENOUS at 00:12

## 2020-12-01 RX ADMIN — DEXTROSE MONOHYDRATE 5.5 G: 25 INJECTION, SOLUTION INTRAVENOUS at 01:23

## 2020-12-01 NOTE — PROGRESS NOTES
0700: Report received from [address]: Assessment complete. Pt AO x 4 with periods of forgetfulness. Gap closed x 3. Pt had multiple IVs infiltrate overnight. 0800: Dr. Dwaine Singh at bedside. Order placed for PICC or midline due to need for access. Fluids changed to D5 1/2 NS.    0915: Spoke to PICC team, will come place line today. 1030: IDR held with Dr. Dwaine Singh. Per Dr. Dwaine Singh, check labs Q12.    1200: Reassessment, no changes from previous    1500: Pt's BGL now 180, notified Dr. Dwaine Singh, order placed for 10 units Lantus to transition pt off insulin gtt. Per MD, transfer pt off gtt and as long as sugar stays stable, OK to transfer pt out of CCU later. 1600: Reassessment complete, no changes from previous assessment    1700: Insulin gtt stopped. 1715: Vascular access team in to place midline. 1800: Notified MD of potassium 3.4.  Order received for 40 mEq potassium PO.    1900: Report given to Marcos Ling

## 2020-12-01 NOTE — DIABETES MGMT
[facility] SPECIALIST CONSULT NOTE    Presentation   Silvestre Diggs is a 67 y.o. female admitted 11/3/20 with high blood sugar and altered mental status. EMS reported that family called due to patient being confused and glucose meter reading \"HIGH. \" Patient is known to this hospital for frequent admissions due to high blood glucose and DKA. Blood glucose per EMS was 557. CXR negative in the ED. COVID  And flu test negative. ABG:       HX:   Past Medical History:   Diagnosis Date    Diabetes ([facility] [address]     Heart failure ([facility] [address]     unknown to family    Hypertension     Stroke ([facility] [address]      DX: DKA. Encephalopathy. Metabolic acidosis. ONEYDA. HTN. TX: insulin gtt. IVF. Clot prevention. BP meds. PICC placement possibly today. Current clinical course has been uncomplicated. Diabetes: Patient has known diabetes, treated with insulin PTA. Admission  and A1c 9.6% indicate poor diabetes control. Patient previously lived in her own home with one of her son's who has special needs. This previous hospital admission she was discharged to her daughter's home with [facility] supposedly. Although per CM note who followed up with daughter, patient had been at her personal home for the weekend and care aides were scheduled to come 5 days a week. Patient daughter stated that it would be her mother's decision whether she came back to patient's daughters home or goes back to her home at discharge.     Consulted by Provider for advanced diabetes nursing assessment and care, specifically related to   [x] Home management assessment    Diabetes-related medical history  Acute complications  DKA  Neurological complications  Hypoglycemia unawareness  Macrovascular disease  CAD and Cerebral vascular accident    Diabetes medication history  Drug class Currently in use Discontinued Never used   Biguanide      DDP-4 inhibitor       Sulfonylurea      Thiazolidinedione      GLP-1 RA      SGLT-2 inhibitors      Basal insulin Tresiba 12 units Daily      Bolus insulin 3 units of humalog before breakfast and 2 units before dinner     Fixed Dose  Combinations        Subjective   \"I just want to rest.\"    Patient reports the following home diabetes self-care practices:  She claims 2 meals a day. A breakfast meal of oatmeal and a dinner meal of chicken and potatoes. Monitoring pattern: inconsistent     Taking medications pattern  [x] Consistent administration  [x] Affordable    Social determinants of health impacting diabetes self-management practices   Concerned that you need to know more about how to stay healthy with diabetes     Objective   Physical exam  General Drowsy. Responded to her name after attempts to wake her. Vital Signs Hypotensive. Afebrile. NSR.    Visit Vitals  BP (!) 102/40   Pulse 73   Temp 97.2 °F (36.2 °C)   Resp 16   Ht 5' 3.5\" (1.613 m)   Wt 54.4 kg (120 lb)   SpO2 100%   BMI 20.92 kg/m²     Laboratory  Tests 11/30 12/1         A1c 9.6%           75         Anion gap 15 8         Serum triglycerides           WBC 13.3 12.1         Serum creatinine 1.5 1.04         GFR 41 >60          AST 40 20         ALT 54 39         Potassium 6.4 3.7         Lactic acid  1.5                      Factors affecting BG management  Factor Dose Comments   Nutrition:  Diabetic Clear liquid   60 grams/meal   Nothing documented but per nursing patient ate some of her breakfast    Pain  Patient reports no pain    Infection  No infection present    Other: D5 1/2NS   Insulin infusion being held and multiple hypoglycemic episodes      Blood glucose pattern        Assessment and Plan   Nursing Diagnosis Risk for unstable blood glucose pattern   Nursing Intervention Domain 3044 Decision-making Support   Nursing Interventions Examined current inpatient diabetes control   Explored factors facilitating and impeding inpatient management  Identified self-management practices impeding diabetes control  Explored corrective strategies with patient and responsible inpatient provider   Informed patient of rational for insulin strategy while hospitalized     Evaluation   This  female, with diabetes, did not achieve diabetes control prior to admission (PTA), as evidenced by admission BG of 695 and A1c of 9.6%. Based on assessment of diabetes self-care practices, interventions that warrant action while hospitalized include:   [x] Healthy Eating   [] Problem Solving  [] Being Active   [x] Healthy Coping  [x] Monitoring   [x] Reducing Risks  [x] Taking Medications    During this hospitalization, the patient has not achieved inpatient blood glucose target of 100-180mg/dl. She was initiated on Glucostabilizer on 11/30/20 at 1600. Yesterday she used a total of 77.2mL of total insulin volume plus 10 units of regular insulin. However over the past 24 hours she has required 6 separate doses of dextrose per the GS for hypoglycemic events. Also had an IV infiltrate earlier which had insulin infusing, per Dr. Court Crowe and nursing she now has a raised area on her arm. This insulin reservoir that is located subcutaneously could negatively be impacting BG and aiding in causing hypoglycemic events. Insulin infusion has been on hold since this morning and D5 1/2NS is infusing and patient is tolerating a clear liquid diet. Recommended if basal insulin is needed to transition with 10 units of Lantus Daily (0.2units/kg/D). To optimize BG control and support a positive health outcome, would utilize the Subcutaneous Insulin Order set (5632).      Recommendations   Recommend:    [x] Use of Subcutaneous Insulin Order set (7833)   Insulin Use Options   Basal                                      (Based on weight, BMI & GFR) Addresses basic metabolic needs [x]        0.2 units/kg/D = 10 units Lantus Daily  [] 0.3 units/kg/D  [] 0.4 units/kg/D   Nutritional                                      (Based on CHO load) Addresses nutrition interventions [] Normal sensitivity  [] Insulin-resistant sensitivity (BMI > 27)   Corrective                                       (Offset gaps in dosing) Useful in adjusting insulin dosing [] Normal sensitivity  [x] HIGH sensitivity  [] Insulin-resistant sensitivity (BMI >27)       Billing Code(s)   I personally reviewed medical record, including notes, data and current medications, and examined the patient at bedside before making care recommendations.      [x] 66223 IP subsequent hospital care - 35 minutes     Angelo Reed MSN, RN, [facility]-  Angelo Reed, CNS  Diabetes Clinical Nurse Specialist  [facility]  Access via [address] [place] [address]

## 2020-12-01 NOTE — PROGRESS NOTES
1900 Verbal shift change report given to  (oncoming nurse) by Antonetta Collet (offgoing nurse). Report included the following information SBAR, ED Summary, MAR, Recent Results and Cardiac Rhythm normal sinus. 1920 arrived from ED, assume pt care. 2000 assessment completed, refer to the complex assessment flow sheet for details    2315 pt with two consecutive blood sugars less than 250, contacted NP to update on pt condition, orders received to add D 5 to sodium bicarb drip.    0000 reassessment no changes from previous assessment    0115 paused insulin drip per glucose stabilizer. BG 72   push 11 ml (5.5 G) of D 50 per glucose stabilizer    0134 BG 53, push 19 ml (8.5 G) of D 50 per the glucose stabilizer    0150 BG 48, push 21 ml (10.5 G) of D 50 per the glucose stabilizer    0157 BG 47, push 21 ml (10.5 G) of D 50 per the glucose stabilizer, update NP on pt condition. 0215  continue to hold insulin drip per the glucose stabilizer. 0315 bladder scan pt, scanner shows 610 ml of urine, place berry for accurate urine output and retention. 0400 reassessment completed, changes charted by exception in the complex assessment flow sheet    0520 BG 64,  push 14 ml (7 G) of D 50 per the glucose stabilizer    0540 , continue to hold insulin drip per the glucose stabilizer. 0600 spoke with NP regarding pt K of 2.9, new orders place. 0630 performed bedside swallow evaluation, pt passed without any difficulty    0710 Bedside and Verbal shift change report given to Jeni (oncoming nurse) by  Rodrick Mancini nurseNoel. Report included the following information SBAR, Intake/Output, MAR, Recent Results and Cardiac Rhythm normal sinus.

## 2020-12-01 NOTE — PROGRESS NOTES
[facility]    ICU TEAM Progress Note    Name: Seun Singleton   : 1940   MRN: [id number]   Date: 2020      Assessment/Plan:     1. DKA   a. AG closed - BGs down  b. Now w/ hypoglycemic events d/t subcut insulin reservoir after gtt infiltrated  c. Monitor - continue D5 - change to D51/2  d. SSI - hold basal d/t hypoGly   e. Space labs to bid   f. Advance diet as tolerated   g. Access difficult, will request PICC/Midline   2. Encephalopathy, acute, metabolic   a. Much improved, likely @/near baseline   3. Severe hi AG metabolic acidosis - resolved   a. D/c bicarb gtt  4. ONEYDA, mild - dehydration d/t DKA - last Cr was WNL @ 0.8   a. Improved after IVFs - monitor UOP   5. HTN - hold meds for now, add back home meds as needed/tolerated   6. Hypothyroid - synthroid   7. Lytes  a. HyperK - resolved, now hypoK - replete prn   8. Dementia - h/o  9. CVAs - h/o   10. COVID - low suspicion - neg x 1  11. SBP Goal of: > 90 mmHg  12. MAP Goal of: > 65 mmHg  13. None - For above SBP/MAP goals  14. IVFs: as above  15. Transfusion Trigger (Hgb): <7 g/dL  16. Respiratory Goals:  a. Incentive spirometry  17. Pulmonary toilet: Duo-Nebs   18. SpO2 Goal: > 92%  19. Keep K>4; Mg>2   20. PT/OT: PT consulted and on board and OT consulted and on board   21. Discussed Plan of Care/Code Status: Full Code  22. Appreciate Consultants Input   23. Discussed Care Plan with Bedside RN  24. Documentation of Current Medications  25.  Further as below:    F - Feeding:  Yes - advance as tolerated   A - Analgesia: Acetaminophen  S - Sedation: N/A  T - DVT Prophylaxis: SCD's or Sequential Compression Device and Lovenox   H - Head of Bed: > 30 Degrees  U - Ulcer Prophylaxis: Not at this time   G - Glycemic Control: Insulin  S - Spontaneous Breathing Trial: N/A  B - Bowel Regimen: MiraLax  I - Indwelling Catheter:   Tubes: None  Lines: Peripheral IV  Drains: Ren Catheter  D - De-escalation of Antibiotics: n/a     Subjective: Progress Note: 12/1/2020      Reason for ICU Admission: AMS d/t DKA     HPI: 75F w/ frequent admissions for DKA p/w AMS. She was well and in her usual state yesterday. Reportedly she did take her meds including insulin yesterday. She was just recently in the hospital for DKA about 3 wks ago. She is unable to provide history due to confusion/AMS. Pt noted to have some hypoglycemic events at home since discharge w/ insulin dose adjustments. Reportedly no prior complaints of fevers, illness, dyspnea. Overnight Events: No major o/n events reported. Insulin gtt infiltrated, so has subcut reservoir. BGs down. Awake, alert. POD:  * No surgery found *    S/P:       Active Problem List:     Problem List  Date Reviewed: 11/22/2020          Codes Class    Vascular dementia without behavioral disturbance (HCC) (Chronic) ICD-10-CM: F01.50  ICD-9-CM: 290.40         Bacteremia ICD-10-CM: R78.81  ICD-9-CM: 790.7         Prerenal azotemia ICD-10-CM: R79.89  ICD-9-CM: 790.6         Dehydration ICD-10-CM: E86.0  ICD-9-CM: 276.51         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 ([facility] ICD-10-CM: E10.649  ICD-9-CM: 250.81         Hypoglycemia ICD-10-CM: E16.2  ICD-9-CM: 251.2         Hyperglycemia due to type 1 diabetes mellitus ([facility] ICD-10-CM: E10.65  ICD-9-CM: 250.01         Venous insufficiency ICD-10-CM: I87.2  ICD-9-CM: 459.81         Anemia ICD-10-CM: D64.9  ICD-9-CM: 285. 9         Acute metabolic encephalopathy MANASA-54-JW: G93.41  ICD-9-CM: 348.31         H/O: CVA (cerebrovascular accident) ICD-10-CM: Z86.73  ICD-9-CM: V12.54         DKA (diabetic ketoacidoses) (HCC) ICD-10-CM: E11.10  ICD-9-CM: 250.12         Dyslipidemia ICD-10-CM: E78.5  ICD-9-CM: 272.4         Hypertension ICD-10-CM: I10  ICD-9-CM: 401.9         Hypothyroidism ICD-10-CM: E03.9  ICD-9-CM: 244.9         Memory deficit ICD-10-CM: R41.3  ICD-9-CM: 780.93               Past Medical History:      has a past medical history of Diabetes ([facility] Heart failure ([facility] Hypertension, and Stroke ([facility] Past Surgical History:      has a past surgical history that includes hx gyn; hx hysterectomy; pr thyroidectomy; pr removal gallbladder; and hx heent. Home Medications:     Prior to Admission medications    Medication Sig Start Date End Date Taking? Authorizing Provider   amLODIPine (NORVASC) 10 mg tablet Take 1 Tab by mouth daily. 10/5/20  Yes Estefania Adamson MD   clopidogreL (PLAVIX) 75 mg tab TAKE 1 TABLET BY MOUTH EVERY DAY 2/27/20  Yes Estefania Adamson MD   insulin lispro (HUMALOG) 100 unit/mL kwikpen 3 units before meals breakfast and lunch and 2 units before dinner 11/26/20   Estefania Adamson MD   insulin degludec Cyrena Plane FlexTouch U-100) 100 unit/mL (3 mL) inpn 12 Units by SubCUTAneous route daily. 11/12/20   Man Mcwilliams MD   L. acidoph & paracasei- S therm- Bifido (GALILEA-Q/RISAQUAD) 8 billion cell cap cap Take 1 Cap by mouth daily. 11/9/20   Estefania Adamson MD   zinc oxide 20 % ointment Apply 1 Applicator to affected area two (2) times a day. thin layer gluteal cleft    Provider, Historical   losartan-hydroCHLOROthiazide (HYZAAR) 100-25 mg per tablet Take 1 Tab by mouth daily. Provider, Historical   metoprolol succinate (TOPROL-XL) 25 mg XL tablet TAKE 1 TABLET BY MOUTH EVERY DAY 5/11/20   Estefania Adamson MD   pravastatin (PRAVACHOL) 80 mg tablet TAKE 1 TABLET BY MOUTH at bedtime 2/27/20   Estefania Adamson MD   levothyroxine (SYNTHROID) 175 mcg tablet TAKE 1 TABLET BY MOUTH EVERY DAY 2/27/20   Estefania Adamson MD   brimonidine (ALPHAGAN) 0.15 % ophthalmic solution Administer 1 Drop to both eyes two (2) times a day.  1/30/15   Provider, Historical       Allergies/Social/Family History:     No Known Allergies   Social History     Tobacco Use    Smoking status: Never Smoker    Smokeless tobacco: Never Used   Substance Use Topics    Alcohol use: No     Comment: been years      Family History   Problem Relation Age of Onset    Diabetes Father     No Known Problems Mother        Review of Systems:     Review of systems not obtained due to patient factors. Objective:   Vital Signs:  Visit Vitals  BP (!) 108/45   Pulse 76   Temp 98 °F (36.7 °C)   Resp 18   Ht 5' 3.5\" (1.613 m)   Wt 54.4 kg (120 lb)   SpO2 100%   BMI 20.92 kg/m²      O2 Device: Room air Temp (24hrs), Av.4 °F (36.9 °C), Min:97.9 °F (36.6 °C), Max:99.6 °F (37.6 °C)           Intake/Output:     Intake/Output Summary (Last 24 hours) at 2020 0744  Last data filed at 2020 4905  Gross per 24 hour   Intake 3874.96 ml   Output 710 ml   Net 3164.96 ml       Physical Exam:    General:  NAD, well noursished, well developed, appears stated age   Eyes:  Sclera anicteric. Pupils equally round and reactive to light. Mouth/Throat: Mucous membranes normal, oral pharynx clear   Neck: Supple   Lungs:   Clear to auscultation bilaterally, good effort   CV:  Regular rate and rhythm,no murmur, click, rub or gallop   Abdomen:   Soft, non-tender. bowel sounds normal. non-distended   Extremities: No cyanosis or edema   Skin: Skin color, texture, turgor normal. no acute rash or lesions   Lymph nodes: Cervical and supraclavicular normal   Musculoskeletal: No swelling or deformity   Lines/Devices:  Intact, no erythema, drainage or tenderness   Neuro/Psych: Awake, alert, nonfocal grossly        LABS AND  DATA: Personally reviewed  Recent Labs     20  1202   WBC 12.1* 13.3*   HGB 9.5* 11.0*   HCT 28.2* 36.4    310     Recent Labs     20  0435 20  0019  20  1351   * 146*   < > 135*   K 2.9* 3.1*   < > 5.1   * 116*   < > 104   CO2 24 24   < > <5*   BUN 28* 29*   < > 35*   CREA 1.04* 1.08*   < > 1.71*   GLU 75 101*   < > 633*   CA 8.4* 8.7   < > 8.7   MG 2.0 1.8  --  2.5*   PHOS 1.9*  --   --  7.2*    < > = values in this interval not displayed.      Recent Labs     20  0435 20  1202    163*   TP 5.9* 7.6   ALB 2.9* 3. 7   GLOB 3.0 3.9     No results for input(s): INR, PTP, APTT, INREXT, INREXT in the last 72 hours. Recent Labs     11/30/20  1341   PHI 6.89*   PCO2I 16.0*   PO2I 89     No results for input(s): CPK, CKMB, TROIQ, BNPP in the last 72 hours. Hemodynamics:   PAP:   CO:     Wedge:   CI:     CVP:    SVR:       PVR:       Ventilator Settings:  Mode Rate Tidal Volume Pressure FiO2 PEEP                    Peak airway pressure:      Minute ventilation:          MEDS: Reviewed    Chest X-Ray:  CXR Results  (Last 48 hours)               11/30/20 1408  XR CHEST PORT Final result    Impression:  IMPRESSION:        No acute process. Narrative:  EXAM:  XR CHEST PORT       INDICATION:  DKA       COMPARISON:  11/5/2020       FINDINGS:       A portable AP radiograph of the chest was obtained at 13:59 hours. The patient   is on a cardiac monitor. The lungs are clear. There is unchanged mild   enlargement of the cardiac silhouette but no vascular congestion or pleural   fluid. Previously demonstrated left pleural effusion has resolved. The bones   and soft tissues are unremarkable. ECHO: 11/4/2020  Left Ventricle  Normal cavity size and systolic function (ejection fraction normal). Mild concentric hypertrophy. The estimated EF is 60 - 65%. Wall Scoring  The left ventricular wall motion is normal.             Left Atrium  Normal cavity size. Right Ventricle  Normal cavity size and global systolic function. Right Atrium  Normal cavity size. Aortic Valve  Normal valve structure, trileaflet valve structure, no stenosis and no regurgitation. Mitral Valve  Normal valve structure, no stenosis and no regurgitation. Tricuspid Valve  Normal valve structure, no stenosis and no regurgitation. Pulmonic Valve  Pulmonic valve not well visualized, but normal doppler findings. Aorta  Normal aortic root. IVC/Hepatic Veins  Normal structure. Normal central venous pressure (0-5 mmHg);  IVC diameter is less than 21 mm and collapses more than 50% with respiration. Pericardium  There is left pleural effusion. Multidisciplinary Rounds Completed: Yes    ABCDEF Bundle/Checklist Completed:  Yes    SPECIAL EQUIPMENT  None    DISPOSITION  Stay in ICU    CRITICAL CARE CONSULTANT NOTE  I had a face to face encounter with the patient, reviewed and interpreted patient data including clinical events, labs, images, vital signs, I/O's, and examined patient. I have discussed the case and the plan and management of the patient's care with the consulting services, the bedside nurses and the respiratory therapist.      NOTE OF PERSONAL INVOLVEMENT IN CARE   This patient has a high probability of imminent, clinically significant deterioration, which requires the highest level of preparedness to intervene urgently. I participated in the decision-making and personally managed or directed the management of the following life and organ supporting interventions that required my frequent assessment to treat or prevent imminent deterioration. I personally spent 36 minutes of critical care time. This is time spent at this critically ill patient's bedside actively involved in patient care as well as the coordination of care and discussions with the patient's family. This does not include any procedural time which has been billed separately.     [facility]  12/1/2020

## 2020-12-01 NOTE — PROGRESS NOTES
Midline insertion procedure note:  Pt has very limited vascular access. Procedure explained to patient along with risks and benefits. Procedure teaching completed. Pre procedure assessment done. Patient denies questions or concerns at this time. Midline sign over the [facility]. Maximum sterile barrier precautions observed throughout procedure. Lidocaine 1% 3ml sc injected to site prior to access the vein. Cannulated left brachial vein using ultrasound guidance. Inserted 4 Fr. single lumen midline to left arm. Blood return verified and flushed with 20ml normal saline. Sterile dressing applied with Biopatch, StatLock and occlusive dressing as per protocol. Curos caps applied to port. Patient tolerated procedure well with minimal blood loss. Reason for access : Reliable IV access  Complications related to insertion :  none   See nursing message on [employer] for midline reminders. Midline is CT and MRI compatible. Inserted by : Luwana Epley, RN [facility] / Vascular Access Nurse  Assisted by : ÓSCAR Rousseau, RN, [facility] / Vascular Access Nurse  Catheter Length : 13 cm   External Length : 2 cm   left arm circumference : 28 cm  Catheter occupies 17% of vein. Type of Midline: ARROW  Ref#: [id number]  Lot#: [id number]  Expiration Date: 2021-09-30  Informed primary nurse VIN Ngo midline is ready for use and to hang new infusion tubing prior to use.     Luwana Epley, RN [facility]   Vascular Access Nurse

## 2020-12-02 LAB
ANION GAP SERPL CALC-SCNC: 5 MMOL/L (ref 5–15)
BUN SERPL-MCNC: 16 MG/DL (ref 6–20)
BUN/CREAT SERPL: 19 (ref 12–20)
CALCIUM SERPL-MCNC: 8 MG/DL (ref 8.5–10.1)
CHLORIDE SERPL-SCNC: 108 MMOL/L (ref 97–108)
CO2 SERPL-SCNC: 26 MMOL/L (ref 21–32)
CREAT SERPL-MCNC: 0.83 MG/DL (ref 0.55–1.02)
ERYTHROCYTE [DISTWIDTH] IN BLOOD BY AUTOMATED COUNT: 16.1 % (ref 11.5–14.5)
GLUCOSE BLD STRIP.AUTO-MCNC: 139 MG/DL (ref 65–100)
GLUCOSE BLD STRIP.AUTO-MCNC: 158 MG/DL (ref 65–100)
GLUCOSE BLD STRIP.AUTO-MCNC: 172 MG/DL (ref 65–100)
GLUCOSE BLD STRIP.AUTO-MCNC: 195 MG/DL (ref 65–100)
GLUCOSE BLD STRIP.AUTO-MCNC: 284 MG/DL (ref 65–100)
GLUCOSE BLD STRIP.AUTO-MCNC: 368 MG/DL (ref 65–100)
GLUCOSE BLD STRIP.AUTO-MCNC: 44 MG/DL (ref 65–100)
GLUCOSE BLD STRIP.AUTO-MCNC: 50 MG/DL (ref 65–100)
GLUCOSE BLD STRIP.AUTO-MCNC: 54 MG/DL (ref 65–100)
GLUCOSE BLD STRIP.AUTO-MCNC: 54 MG/DL (ref 65–100)
GLUCOSE BLD STRIP.AUTO-MCNC: 84 MG/DL (ref 65–100)
GLUCOSE SERPL-MCNC: 185 MG/DL (ref 65–100)
HCT VFR BLD AUTO: 28.7 % (ref 35–47)
HGB BLD-MCNC: 9.5 G/DL (ref 11.5–16)
MAGNESIUM SERPL-MCNC: 1.7 MG/DL (ref 1.6–2.4)
MCH RBC QN AUTO: 31 PG (ref 26–34)
MCHC RBC AUTO-ENTMCNC: 33.1 G/DL (ref 30–36.5)
MCV RBC AUTO: 93.8 FL (ref 80–99)
NRBC # BLD: 0 K/UL (ref 0–0.01)
NRBC BLD-RTO: 0 PER 100 WBC
PHOSPHATE SERPL-MCNC: 1.6 MG/DL (ref 2.6–4.7)
PLATELET # BLD AUTO: 216 K/UL (ref 150–400)
PMV BLD AUTO: 10.2 FL (ref 8.9–12.9)
POTASSIUM SERPL-SCNC: 4.1 MMOL/L (ref 3.5–5.1)
RBC # BLD AUTO: 3.06 M/UL (ref 3.8–5.2)
SERVICE CMNT-IMP: ABNORMAL
SERVICE CMNT-IMP: NORMAL
SODIUM SERPL-SCNC: 139 MMOL/L (ref 136–145)
WBC # BLD AUTO: 5.8 K/UL (ref 3.6–11)

## 2020-12-02 PROCEDURE — 74011000250 HC RX REV CODE- 250: Performed by: EMERGENCY MEDICINE

## 2020-12-02 PROCEDURE — 80048 BASIC METABOLIC PNL TOTAL CA: CPT

## 2020-12-02 PROCEDURE — 82962 GLUCOSE BLOOD TEST: CPT

## 2020-12-02 PROCEDURE — 85027 COMPLETE CBC AUTOMATED: CPT

## 2020-12-02 PROCEDURE — 74011250637 HC RX REV CODE- 250/637: Performed by: INTERNAL MEDICINE

## 2020-12-02 PROCEDURE — 74011636637 HC RX REV CODE- 636/637: Performed by: INTERNAL MEDICINE

## 2020-12-02 PROCEDURE — 84100 ASSAY OF PHOSPHORUS: CPT

## 2020-12-02 PROCEDURE — 83735 ASSAY OF MAGNESIUM: CPT

## 2020-12-02 PROCEDURE — 74011250636 HC RX REV CODE- 250/636: Performed by: INTERNAL MEDICINE

## 2020-12-02 PROCEDURE — 36415 COLL VENOUS BLD VENIPUNCTURE: CPT

## 2020-12-02 PROCEDURE — 65270000015 HC RM PRIVATE ONCOLOGY

## 2020-12-02 RX ORDER — INSULIN LISPRO 100 [IU]/ML
4 INJECTION, SOLUTION INTRAVENOUS; SUBCUTANEOUS
Status: DISCONTINUED | OUTPATIENT
Start: 2020-12-03 | End: 2020-12-04

## 2020-12-02 RX ORDER — INSULIN GLARGINE 100 [IU]/ML
16 INJECTION, SOLUTION SUBCUTANEOUS
Status: DISCONTINUED | OUTPATIENT
Start: 2020-12-02 | End: 2020-12-04

## 2020-12-02 RX ORDER — SODIUM CHLORIDE 9 MG/ML
250 INJECTION, SOLUTION INTRAVENOUS AS NEEDED
Status: DISCONTINUED | OUTPATIENT
Start: 2020-12-02 | End: 2020-12-03

## 2020-12-02 RX ORDER — SODIUM,POTASSIUM PHOSPHATES 280-250MG
2 POWDER IN PACKET (EA) ORAL ONCE
Status: COMPLETED | OUTPATIENT
Start: 2020-12-02 | End: 2020-12-02

## 2020-12-02 RX ADMIN — INSULIN LISPRO 3 UNITS: 100 INJECTION, SOLUTION INTRAVENOUS; SUBCUTANEOUS at 11:57

## 2020-12-02 RX ADMIN — ACETAMINOPHEN 650 MG: 325 TABLET ORAL at 22:00

## 2020-12-02 RX ADMIN — Medication 10 ML: at 13:54

## 2020-12-02 RX ADMIN — ENOXAPARIN SODIUM 30 MG: 30 INJECTION SUBCUTANEOUS at 08:36

## 2020-12-02 RX ADMIN — INSULIN LISPRO 6 UNITS: 100 INJECTION, SOLUTION INTRAVENOUS; SUBCUTANEOUS at 18:23

## 2020-12-02 RX ADMIN — METOPROLOL TARTRATE 12.5 MG: 25 TABLET, FILM COATED ORAL at 22:00

## 2020-12-02 RX ADMIN — HUMAN INSULIN 5 UNITS: 100 INJECTION, SUSPENSION SUBCUTANEOUS at 12:20

## 2020-12-02 RX ADMIN — CLOPIDOGREL BISULFATE 75 MG: 75 TABLET ORAL at 08:36

## 2020-12-02 RX ADMIN — Medication 10 ML: at 05:00

## 2020-12-02 RX ADMIN — LEVOTHYROXINE SODIUM 175 MCG: 0.03 TABLET ORAL at 07:43

## 2020-12-02 RX ADMIN — INSULIN GLARGINE 16 UNITS: 100 INJECTION, SOLUTION SUBCUTANEOUS at 22:00

## 2020-12-02 RX ADMIN — Medication 10 ML: at 22:01

## 2020-12-02 RX ADMIN — METOPROLOL TARTRATE 12.5 MG: 25 TABLET, FILM COATED ORAL at 08:36

## 2020-12-02 RX ADMIN — DEXTROSE MONOHYDRATE 25 G: 25 INJECTION, SOLUTION INTRAVENOUS at 04:23

## 2020-12-02 RX ADMIN — POTASSIUM & SODIUM PHOSPHATES POWDER PACK 280-160-250 MG 2 PACKET: 280-160-250 PACK at 11:57

## 2020-12-02 NOTE — ROUTINE PROCESS
Pt transferred to room 1117 in wheelchair. Assisted to recliner, VIN Nassar and CNA present in room. Chair alarm set up as well. Pt transferred with minimal assistance.

## 2020-12-02 NOTE — PROGRESS NOTES
19: 00-Received report from  [facility]. Pt is A&Ox3, SR on tele, Room air, and ambulating at baseline @home, but has not ambulated @facility. Per verbal report from RN. Dr. Court Crowe has approved Q4 blood sugars [email or web address], will continue to monitor for AMS status related to BS changes. D5 1/2NS fluids stopped as well. 21:45-Pt had 7 beats VTACH, vital signs and pt assessed, no changes. 23:22- BS 60, treated per MAR orders, would not drink juice. Pt states having low BS at night at home frequently. 23:52-Rechecked .   04:00-Q4 sugar, BS 44&54, pt able to drink juice, gave pt 2 apple juices, will recheck sugar. Reassessed, no changes to assessment other than orientation changes through night, periodic confusion. 04:20-Pt suagr still low, gave D50 IV per MAR protocol. 04:40-Rechecked sugar, 172. Will continue to monitor  07:00-Report given to [facility]. Updated about nights events and changes.

## 2020-12-02 NOTE — DIABETES MGMT
[facility] SPECIALIST CONSULT NOTE    Presentation   Silvia Muniz is a 67 y.o. female admitted 11/3/20 with high blood sugar and altered mental status. EMS reported that family called due to patient being confused and glucose meter reading \"HIGH. \" Patient is known to this hospital for frequent admissions due to high blood glucose and DKA. Blood glucose per EMS was 557. CXR negative in the ED. COVID  And flu test negative. ABG:       HX:   Past Medical History:   Diagnosis Date    Diabetes ([facility]     Heart failure ([facility]     unknown to family    Hypertension     Stroke ([facility]      DX: DKA. Encephalopathy. Metabolic acidosis. ONEYDA. HTN. TX: insulin gtt. IVF. Clot prevention. BP meds. PICC placement possibly today. Current clinical course has been uncomplicated. 12/2/20 midline inserted yesterday. 7 runs of Vtach over night, no treatment required. BS of 60 at 2330 treated with dextrose, another low BG at 0400 of 44 treat with juice. 0420 BG still low and given dextrose. Orders to transfer out of ICU today. Diabetes: Patient has known diabetes, treated with insulin PTA. Admission  and A1c 9.6% indicate poor diabetes control. Patient previously lived in her own home with one of her son's who has special needs. This previous hospital admission she was discharged to her daughter's home with [facility] supposedly. Although per CM note who followed up with daughter, patient had been at her personal home for the weekend and care aides were scheduled to come 5 days a week. Patient daughter stated that it would be her mother's decision whether she came back to patient's daughters home or goes back to her home at discharge.     Consulted by Provider for advanced diabetes nursing assessment and care, specifically related to   [x] Home management assessment    Diabetes-related medical history  Acute complications  DKA  Neurological complications  Hypoglycemia unawareness  Macrovascular disease  CAD and Cerebral vascular accident    Diabetes medication history  Drug class Currently in use Discontinued Never used   Biguanide      DDP-4 inhibitor       Sulfonylurea      Thiazolidinedione      GLP-1 RA      SGLT-2 inhibitors      Basal insulin Tresiba 12 units Daily      Bolus insulin 3 units of humalog before breakfast and 2 units before dinner     Fixed Dose  Combinations        Subjective   \"I'm hungry this morning. \"    Patient reports the following home diabetes self-care practices:  She claims 2 meals a day. A breakfast meal of oatmeal and a dinner meal of chicken and potatoes. Monitoring pattern: inconsistent     Taking medications pattern  [x] Consistent administration  [x] Affordable    Social determinants of health impacting diabetes self-management practices   Concerned that you need to know more about how to stay healthy with diabetes     Objective   Physical exam  General Alert and pleasantly confused at times. Vital Signs Normotensive. Afebrile. NSR.    Visit Vitals  BP (!) 130/53   Pulse 64   Temp 98.3 °F (36.8 °C)   Resp 13   Ht 5' 3.5\" (1.613 m)   Wt 54.4 kg (120 lb)   SpO2 100%   BMI 20.92 kg/m²     Laboratory  Tests 11/30 12/1 12/2        A1c 9.6%           75 185        Anion gap 15 8 5        Serum triglycerides           WBC 13.3 12.1 5.8        Serum creatinine 1.5 1.04 0.83        GFR 41 >60  >60        AST 40 20         ALT 54 39         Potassium 6.4 3.7 4.1        Lactic acid  1.5                      Factors affecting BG management  Factor Dose Comments   Nutrition:  Diabetic Consistent Carb   60 grams/meal   Sitting up and eating breakfast this morning    Pain  Patient reports no pain    Infection  No infection present    Other: D5 1/2NS   Stopped this morning      Blood glucose pattern        Assessment and Plan   Nursing Diagnosis Risk for unstable blood glucose pattern   Nursing Intervention Domain 9923 Decision-making Support Nursing Interventions Examined current inpatient diabetes control   Explored factors facilitating and impeding inpatient management  Identified self-management practices impeding diabetes control  Explored corrective strategies with patient and responsible inpatient provider   Informed patient of rational for insulin strategy while hospitalized     Evaluation   This  female, with diabetes, did not achieve diabetes control prior to admission (PTA), as evidenced by admission BG of 695 and A1c of 9.6%. Based on assessment of diabetes self-care practices, interventions that warrant action while hospitalized include:   [x] Healthy Eating   [] Problem Solving  [] Being Active   [x] Healthy Coping  [x] Monitoring   [x] Reducing Risks  [x] Taking Medications    During this hospitalization, the patient has not achieved inpatient blood glucose target of 100-180mg/dl. She was initiated on Glucostabilizer on 11/30/20 at 1600. On 11/30/20 she used a total of 77.2mL of total insulin volume plus 10 units of regular insulin. Also had an IV infiltrate yesterday which had insulin infusing, per the intensivist and nursing she had a raised area on her arm (insulin reservoir), this has improved. She was transitioned yesterday with 10 units of Lantus Daily (0.2units/kg/D) but was still drowsy and not eating much yesterday. This morning she is more alert and eating her breakfast well. D5 1/2 NS infusion was stopped also this morning so that is no longer impacting BG. Spoke with nursing since lunch time BG was in the 200s. Orders for a one time dose of 5 units of NPH in order to have an impact on BG until next bedtime basal dose is given. Basal insulin increased to 0.3units/kg/D. To optimize BG control and support a positive health outcome, would utilize the Subcutaneous Insulin Order set (4895).      Recommendations   Recommend:    [x] Use of Subcutaneous Insulin Order set (9725)   Insulin Use Options   Basal (Based on weight, BMI & GFR) Addresses basic metabolic needs []        0.2 units/kg/D   [x] 0.3 units/kg/D = 16 units of Lantus Daily  [] 0.4 units/kg/D   Nutritional                                      (Based on CHO load) Addresses nutrition interventions [] Normal sensitivity  [] Insulin-resistant sensitivity (BMI > 27)   Corrective                                       (Offset gaps in dosing) Useful in adjusting insulin dosing [] Normal sensitivity  [x] HIGH sensitivity  [] Insulin-resistant sensitivity (BMI >27)       Billing Code(s)   I personally reviewed medical record, including notes, data and current medications, and examined the patient at bedside before making care recommendations.      [x] 86303 IP subsequent hospital care - 25 minutes     Rhiannon Howell MSN, RN, ACCNS-AG  SOLOM[facility] Del[facility]  Diabetes Clinical Nurse Specialist  [facility]  Access via [address] [place] [address]

## 2020-12-02 NOTE — PROGRESS NOTES
Care Management:    ALEKSANDAR Plan  · Home w/ daughter and personal care aides . Patient has been approved for Medicaid care aides from [facility] scheduled to start today 11/30. · -Resumption of PT, OT, SN with [facility]. ( SPENSER order needed by MD)  · Family has been working on trying to obtain hospital bed,  [facility] OT was scheduled to come out 11/27 to assess for need for hospital bed. (follow up needed). · Follow up appointments (PCP)   · 2nd IM Letter      I called this afternoon and spoke with patients DTR Nidhi. We discussed  that the frequent DKA episodes and hospitalizations are very hard on the patient . Mckayla Lam agrees patient has to come home with her and stay with her. Mckayla Lam also agrees she needs to be very involved with moms care including her diabetes management. She understands she needs to work with the home health nurse to learn and follow through with patients diabetic needs. Chart reviewed and we will cont to follow for discharge needs as appropriate.      Mariama Maciel RN [facility] 5070

## 2020-12-02 NOTE — PROGRESS NOTES
[facility] Adult  Hospitalist Group                                                                                          Critical Care Progress Note  Nick Reid MD  Answering service: [phone] OR 36 from in house phone        Date of Service:  2020  NAME:  Kayla Conklin  :  1940  MRN:  [id number]      Interval history / Subjective:      DKA resolved, off insulin since yesterday, starting Diabetic diet now,  Has a berry  For retention, on no iv fluids at this point. Assessment & Plan:     1. DKA   a. AG closed - insulin drip off  b. Start PO diet  2. Encephalopathy, acute, metabolic   a. Much improved, likely @/near baseline   3. Severe hi AG metabolic acidosis - resolved   a. D/cd bicarb gtt yesterday  4. ONEYDA, mild - dehydration d/t DKA - last Cr was WNL @ 0.8   a. Improved after IVFs - monitor UOP . Off fluids now  5. HTN - hold meds for now, add back home meds as needed/tolerated   6. Hypothyroid - synthroid   7. Lytes  a. HyperK - resolved, now hypoK - replete prn   8. Dementia - h/o  9. CVAs - h/o   10. COVID - low suspicion - neg x 1  11. SBP Goal of: > 90 mmHg  12. MAP Goal of: > 65 mmHg  13. None - For above SBP/MAP goals  14. IVFs: as above  15. Transfusion Trigger (Hgb): <7 g/dL  16. Respiratory Goals:  a. Incentive spirometry  17. Pulmonary toilet: Duo-Nebs   18. SpO2 Goal: > 92%  19. Keep K>4; Mg>2   20. PT/OT: PT consulted and on board and OT consulted and on board   21. Discussed Plan of Care/Code Status: Full Code  22. Appreciate Consultants Input   23. Discussed Care Plan with Bedside RN  24. Documentation of Current Medications  25.  Further as below:     F - Feeding:  Yes - advance as tolerated   A - Analgesia: Acetaminophen  S - Sedation: N/A  T - DVT Prophylaxis: SCD's or Sequential Compression Device and Lovenox   H - Head of Bed: > 30 Degrees  U - Ulcer Prophylaxis: Not at this time   G - Glycemic Control: Insulin  S - Spontaneous Breathing Trial: N/A  B - Bowel Regimen: MiraLax  I - Indwelling Catheter:              Tubes: None  Lines: Peripheral IV  Drains: Ren Catheter ( for chonic retention)  D - De-escalation of Antibiotics: n/a     Dispo:  Transfer out of the ICU. Discussed briefly with Dr. Ana Lilia Caruso who will take over on the floor.  Level 3     Hospital Problems  Date Reviewed: 11/22/2020          Codes Class Noted POA    DKA (diabetic ketoacidoses) [facility]) ICD-10-CM: E11.10  ICD-9-CM: 250.12  6/2/2017 Unknown                Review of Systems:   A comprehensive review of systems was negative except for that written in the HPI. Vital Signs:    Last 24hrs VS reviewed since prior progress note. Most recent are:  Visit Vitals  BP (!) 130/53   Pulse 64   Temp 98.3 °F (36.8 °C)   Resp 13   Ht 5' 3.5\" (1.613 m)   Wt 54.4 kg (120 lb)   SpO2 100%   BMI 20.92 kg/m²         Intake/Output Summary (Last 24 hours) at 12/2/2020 1104  Last data filed at 12/2/2020 0900  Gross per 24 hour   Intake 302.34 ml   Output 965 ml   Net -662.66 ml        Physical Examination:             Constitutional:  No acute distress, cooperative, pleasant    ENT:  Oral mucous moist, oropharynx benign. Resp:  CTA bilaterally. No wheezing/rhonchi/rales. No accessory muscle use   CV:  Regular rhythm, normal rate, no murmurs, gallops, rubs    GI:  Soft, non distended, non tender. normoactive bowel sounds, no hepatosplenomegaly     Musculoskeletal:  No edema, warm, 2+ pulses throughout    Neurologic:  Moves all extremities. AAOx3, CN II-XII reviewed     Psych:  Good insight, Not anxious nor agitated. Skin:  Good turgor, no rashes or ulcers  Hematologic/Lymphatic/Immunlogic:  No jaundice nor lymph node swelling  :  deferred  Eyes:  EOMI. Anicteric sclerae, PERRL.        Data Review:    Review and/or order of clinical lab test      Labs:     Recent Labs     12/02/20  0446 12/01/20  0435   WBC 5.8 12.1*   HGB 9.5* 9.5*   HCT 28.7* 28.2*    233     Recent Labs 12/02/20  0446 12/01/20  1705 12/01/20  0819 12/01/20  0435  11/30/20  1351    138 142 147*   < > 135*   K 4.1 3.4* 3.7 2.9*   < > 5.1    111* 114* 115*   < > 104   CO2 26 20* 22 24   < > <5*   BUN 16 24* 24* 28*   < > 35*   CREA 0.83 0.92 0.83 1.04*   < > 1.71*   * 179* 150* 75   < > 633*   CA 8.0* 8.0* 7.6* 8.4*   < > 8.7   MG 1.7 1.9 1.6 2.0   < > 2.5*   PHOS 1.6*  --   --  1.9*  --  7.2*    < > = values in this interval not displayed. Recent Labs     12/01/20 0435 11/30/20  1202   ALT 39 54    163*   TBILI 0.2 0.6   TP 5.9* 7.6   ALB 2.9* 3.7   GLOB 3.0 3.9     No results for input(s): INR, PTP, APTT, INREXT in the last 72 hours. No results for input(s): FE, TIBC, PSAT, FERR in the last 72 hours. Lab Results   Component Value Date/Time    Folate 7.2 12/04/2019 04:24 AM      No results for input(s): PH, PCO2, PO2 in the last 72 hours. No results for input(s): CPK, CKNDX, TROIQ in the last 72 hours.     No lab exists for component: CPKMB  Lab Results   Component Value Date/Time    Cholesterol, total 167 11/07/2020 02:16 AM    HDL Cholesterol 80 11/07/2020 02:16 AM    LDL, calculated 69.8 11/07/2020 02:16 AM    Triglyceride 86 11/07/2020 02:16 AM    CHOL/HDL Ratio 2.1 11/07/2020 02:16 AM     Lab Results   Component Value Date/Time    Glucose (POC) 139 (H) 12/02/2020 07:41 AM    Glucose (POC) 158 (H) 12/02/2020 06:02 AM    Glucose (POC) 172 (H) 12/02/2020 04:42 AM    Glucose (POC) 50 (L) 12/02/2020 04:21 AM    Glucose (POC) 54 (L) 12/02/2020 04:20 AM     Lab Results   Component Value Date/Time    Color YELLOW 11/30/2020 03:49 PM    Appearance CLEAR 11/30/2020 03:49 PM    Specific gravity 1.025 11/30/2020 03:49 PM    Specific gravity 1.012 11/24/2020 06:34 AM    pH (UA) 5.5 11/30/2020 03:49 PM    Protein TRACE (A) 11/30/2020 03:49 PM    Glucose Negative 11/30/2020 03:49 PM    Ketone 80 (A) 11/30/2020 03:49 PM    Bilirubin Negative 11/30/2020 03:49 PM    Urobilinogen 0.2 11/30/2020 03:49 PM    Nitrites Negative 11/30/2020 03:49 PM    Leukocyte Esterase Negative 11/30/2020 03:49 PM    Epithelial cells FEW 11/30/2020 03:49 PM    Bacteria 1+ (A) 11/30/2020 03:49 PM    WBC 5-10 11/30/2020 03:49 PM    RBC 0-5 11/30/2020 03:49 PM         Medications Reviewed:     Current Facility-Administered Medications   Medication Dose Route Frequency    potassium, sodium phosphates (NEUTRA-PHOS) packet 2 Packet  2 Packet Oral ONCE    clopidogreL (PLAVIX) tablet 75 mg  75 mg Oral DAILY    levothyroxine (SYNTHROID) tablet 175 mcg  175 mcg Oral ACB    metoprolol tartrate (LOPRESSOR) tablet 12.5 mg  12.5 mg Oral Q12H    insulin glargine (LANTUS) injection 10 Units  10 Units SubCUTAneous QHS    insulin lispro (HUMALOG) injection   SubCUTAneous TIDAC    glucose chewable tablet 16 g  4 Tab Oral PRN    dextrose (D50W) injection syrg 12.5-25 g  25-50 mL IntraVENous PRN    glucagon (GLUCAGEN) injection 1 mg  1 mg IntraMUSCular PRN    sodium chloride (NS) flush 5-40 mL  5-40 mL IntraVENous Q8H    sodium chloride (NS) flush 5-40 mL  5-40 mL IntraVENous PRN    acetaminophen (TYLENOL) tablet 650 mg  650 mg Oral Q6H PRN    Or    acetaminophen (TYLENOL) suppository 650 mg  650 mg Rectal Q6H PRN    polyethylene glycol (MIRALAX) packet 17 g  17 g Oral DAILY PRN    promethazine (PHENERGAN) tablet 12.5 mg  12.5 mg Oral Q6H PRN    Or    ondansetron (ZOFRAN) injection 4 mg  4 mg IntraVENous Q6H PRN    enoxaparin (LOVENOX) injection 30 mg  30 mg SubCUTAneous DAILY     ______________________________________________________________________  EXPECTED LENGTH OF STAY: - - -  ACTUAL LENGTH OF STAY:          2                 Estrella Malik MD

## 2020-12-02 NOTE — PROGRESS NOTES
General Daily Progress Note    Admit Date: 11/30/2020    Subjective:     Patient has no complaint      Current Facility-Administered Medications   Medication Dose Route Frequency    clopidogreL (PLAVIX) tablet 75 mg  75 mg Oral DAILY    levothyroxine (SYNTHROID) tablet 175 mcg  175 mcg Oral ACB    metoprolol tartrate (LOPRESSOR) tablet 12.5 mg  12.5 mg Oral Q12H    insulin glargine (LANTUS) injection 10 Units  10 Units SubCUTAneous QHS    insulin lispro (HUMALOG) injection   SubCUTAneous TIDAC    glucose chewable tablet 16 g  4 Tab Oral PRN    dextrose (D50W) injection syrg 12.5-25 g  25-50 mL IntraVENous PRN    glucagon (GLUCAGEN) injection 1 mg  1 mg IntraMUSCular PRN    sodium chloride (NS) flush 5-40 mL  5-40 mL IntraVENous Q8H    sodium chloride (NS) flush 5-40 mL  5-40 mL IntraVENous PRN    acetaminophen (TYLENOL) tablet 650 mg  650 mg Oral Q6H PRN    Or    acetaminophen (TYLENOL) suppository 650 mg  650 mg Rectal Q6H PRN    polyethylene glycol (MIRALAX) packet 17 g  17 g Oral DAILY PRN    promethazine (PHENERGAN) tablet 12.5 mg  12.5 mg Oral Q6H PRN    Or    ondansetron (ZOFRAN) injection 4 mg  4 mg IntraVENous Q6H PRN    enoxaparin (LOVENOX) injection 30 mg  30 mg SubCUTAneous DAILY        Review of Systems  A comprehensive review of systems was negative.     Objective:     Patient Vitals for the past 24 hrs:   BP Temp Pulse Resp SpO2   12/02/20 0800 (!) 141/57  71 19 95 %   12/02/20 0700   72 18 100 %   12/02/20 0600 (!) 121/44  72 23    12/02/20 0500 (!) 141/58  78 30 100 %   12/02/20 0400 (!) 145/61 98.3 °F (36.8 °C) 77 14 99 %   12/02/20 0355 (!) 141/66  77 20 96 %   12/02/20 0300 (!) 144/120  81 11 98 %   12/02/20 0225 121/84  73 14 100 %   12/02/20 0200   73 14 94 %   12/02/20 0100 (!) 106/90  73 16 100 %   12/02/20 0000 (!) 126/97 98.5 °F (36.9 °C) 75 23 100 %   12/01/20 2300 (!) 125/57  75 17 100 %   12/01/20 2200 (!) 126/59  76 16 100 %   12/01/20 2100 (!) 124/48  80 15 100 %   12/01/20 2000 (!) 125/55 98.1 °F (36.7 °C) 80 19 100 %   12/01/20 1900 (!) 127/54  76 16 100 %   12/01/20 1800 (!) 118/55  81 18 100 %   12/01/20 1700 (!) 129/56  78 17 100 %   12/01/20 1600 (!) 143/60 98.4 °F (36.9 °C) 79 19 100 %   12/01/20 1500 (!) 136/53  77 15 100 %   12/01/20 1400 (!) 131/46  81 11 100 %   12/01/20 1300 (!) 117/51  72 16 100 %   12/01/20 1200 (!) 100/40 98.1 °F (36.7 °C) 72 16 100 %   12/01/20 1100 (!) 102/40  73 16 100 %   12/01/20 1000 103/86  80 12 100 %   12/01/20 0900 (!) 120/48  83 21 97 %     No intake/output data recorded. 11/30 1901 - 12/02 0700  In: 4488.6 [P.O.:120; I.V.:4368.6]  Out: 1735 [Urine:1735]    Physical Exam:   Visit Vitals  BP (!) 141/57   Pulse 71   Temp 98.3 °F (36.8 °C)   Resp 19   Ht 5' 3.5\" (1.613 m)   Wt 120 lb (54.4 kg)   SpO2 95%   BMI 20.92 kg/m²     General appearance: alert, cooperative, no distress, appears stated age  Neck: supple, symmetrical, trachea midline, no adenopathy, thyroid: not enlarged, symmetric, no tenderness/mass/nodules, no carotid bruit and no JVD  Lungs: clear to auscultation bilaterally  Heart: regular rate and rhythm, S1, S2 normal, no murmur, click, rub or gallop  Abdomen: soft, non-tender. Bowel sounds normal. No masses,  no organomegaly  Extremities: extremities normal, atraumatic, no cyanosis or edema    Assessment:     Active Problems:    D[facility]diabetic ketoacidoses[facility] (6/2/2017)        Plan:     1. Patient presented with DKA which is now resolved. Resumption of maintenance medication. Basal insulin should be every morning not at bedtime. Continue prandial insulin. 2.  Hydration adequate. 3.  Dementia is getting worse.

## 2020-12-02 NOTE — PROGRESS NOTES
Physician Progress Note      Tayler Penny  [facility] #:                  [id number]  :                       1940  ADMIT DATE:       2020 10:16 AM  DISCH DATE:  RESPONDING  PROVIDER #:        Kayden Chacko DO          QUERY TEXT:    Dr Lissette Lopez:  Pt admitted with DKA and has history of CHF documented. If possible, please document in progress notes and discharge summary further specificity regarding the type and acuity of CHF:    The medical record reflects the following:  Risk Factors: presents with DKA  Clinical Indicators: history of CHF noted, Echo from 2020 with EF 60-65%; Normal  systolic function. Treatment: maintain I/Os, daily weights, serial labs,  Options provided:  -- Chronic Diastolic CHF/HFpEF  -- Chronic Systolic and Diastolic CHF  -- Other - I will add my own diagnosis  -- Disagree - Not applicable / Not valid  -- Disagree - Clinically unable to determine / Unknown  -- Refer to Clinical Documentation Reviewer    PROVIDER RESPONSE TEXT:    This patient has chronic diastolic CHF/HFpEF. Query created by:  Dustin Hammer on 2020 3:02 PM      Electronically signed by:  Kayden Chacko DO 2020 5:25 PM

## 2020-12-02 NOTE — INTERDISCIPLINARY ROUNDS
Critical care interdisciplinary rounds held on 12/2/2020. Following members present, Pharmacy, Diabetes Treatment, Case Management, Respiratory Therapy, Physical Therapy,  Palliative Care and Nutrition. Led by ******** and Dr. Niyah Morales. Plan of care discussed. See clinical pathway for plan of care and interventions and desired outcomes.

## 2020-12-02 NOTE — INTERDISCIPLINARY ROUNDS
Critical care interdisciplinary rounds held on 12/2/2020. Following members present, Pharmacy, Diabetes Treatment, Case Management, Respiratory Therapy, Physical Therapy,  Palliative Care and Nutrition. Led by Stuart Ingram RN and Dr. Keke Ratliff. Plan of care discussed. See clinical pathway for plan of care and interventions and desired outcomes.

## 2020-12-02 NOTE — PROGRESS NOTES
Oncology End of Shift Note      Bedside shift change report given to Amy Winkler RN (incoming nurse) by Khris Reece (outgoing nurse) on [facility]. Report included the following information SBAR, Kardex and MAR. Shift Summary: Patient admitted from CCU. No complaints of pain and hourly rounding completed. Patient up x1 to the chair. Ren care completed. MD Leesa Lim informed of patient blood sugar of 368. MD Leesa Lim stated to give 6 units of humalog. Issues for Physician to Address:       Patient on Cardiac Monitoring?     [x] Yes  [] No    Rhythm: Telemetry: normal sinus rhythm         Shift Events        Khris Reece

## 2020-12-03 LAB
ANION GAP SERPL CALC-SCNC: 5 MMOL/L (ref 5–15)
BUN SERPL-MCNC: 12 MG/DL (ref 6–20)
BUN/CREAT SERPL: 22 (ref 12–20)
CALCIUM SERPL-MCNC: 7.9 MG/DL (ref 8.5–10.1)
CHLORIDE SERPL-SCNC: 108 MMOL/L (ref 97–108)
CO2 SERPL-SCNC: 26 MMOL/L (ref 21–32)
CREAT SERPL-MCNC: 0.55 MG/DL (ref 0.55–1.02)
GLUCOSE BLD STRIP.AUTO-MCNC: 227 MG/DL (ref 65–100)
GLUCOSE BLD STRIP.AUTO-MCNC: 268 MG/DL (ref 65–100)
GLUCOSE BLD STRIP.AUTO-MCNC: 395 MG/DL (ref 65–100)
GLUCOSE BLD STRIP.AUTO-MCNC: 53 MG/DL (ref 65–100)
GLUCOSE BLD STRIP.AUTO-MCNC: 76 MG/DL (ref 65–100)
GLUCOSE SERPL-MCNC: 91 MG/DL (ref 65–100)
POTASSIUM SERPL-SCNC: 3.4 MMOL/L (ref 3.5–5.1)
SERVICE CMNT-IMP: ABNORMAL
SERVICE CMNT-IMP: NORMAL
SODIUM SERPL-SCNC: 139 MMOL/L (ref 136–145)

## 2020-12-03 PROCEDURE — 74011250637 HC RX REV CODE- 250/637: Performed by: INTERNAL MEDICINE

## 2020-12-03 PROCEDURE — 74011250636 HC RX REV CODE- 250/636: Performed by: INTERNAL MEDICINE

## 2020-12-03 PROCEDURE — 97116 GAIT TRAINING THERAPY: CPT

## 2020-12-03 PROCEDURE — 82962 GLUCOSE BLOOD TEST: CPT

## 2020-12-03 PROCEDURE — 80048 BASIC METABOLIC PNL TOTAL CA: CPT

## 2020-12-03 PROCEDURE — 74011636637 HC RX REV CODE- 636/637: Performed by: INTERNAL MEDICINE

## 2020-12-03 PROCEDURE — 97530 THERAPEUTIC ACTIVITIES: CPT

## 2020-12-03 PROCEDURE — 97161 PT EVAL LOW COMPLEX 20 MIN: CPT

## 2020-12-03 PROCEDURE — 36415 COLL VENOUS BLD VENIPUNCTURE: CPT

## 2020-12-03 PROCEDURE — 65270000015 HC RM PRIVATE ONCOLOGY

## 2020-12-03 RX ADMIN — INSULIN LISPRO 4 UNITS: 100 INJECTION, SOLUTION INTRAVENOUS; SUBCUTANEOUS at 17:31

## 2020-12-03 RX ADMIN — INSULIN LISPRO 4 UNITS: 100 INJECTION, SOLUTION INTRAVENOUS; SUBCUTANEOUS at 12:44

## 2020-12-03 RX ADMIN — INSULIN GLARGINE 16 UNITS: 100 INJECTION, SOLUTION SUBCUTANEOUS at 22:46

## 2020-12-03 RX ADMIN — METOPROLOL TARTRATE 12.5 MG: 25 TABLET, FILM COATED ORAL at 08:11

## 2020-12-03 RX ADMIN — POTASSIUM BICARBONATE 20 MEQ: 782 TABLET, EFFERVESCENT ORAL at 12:45

## 2020-12-03 RX ADMIN — LEVOTHYROXINE SODIUM 175 MCG: 0.03 TABLET ORAL at 08:15

## 2020-12-03 RX ADMIN — CLOPIDOGREL BISULFATE 75 MG: 75 TABLET ORAL at 08:11

## 2020-12-03 RX ADMIN — ENOXAPARIN SODIUM 30 MG: 30 INJECTION SUBCUTANEOUS at 08:11

## 2020-12-03 RX ADMIN — Medication 10 ML: at 17:32

## 2020-12-03 RX ADMIN — POTASSIUM BICARBONATE 20 MEQ: 782 TABLET, EFFERVESCENT ORAL at 17:31

## 2020-12-03 RX ADMIN — METOPROLOL TARTRATE 12.5 MG: 25 TABLET, FILM COATED ORAL at 22:44

## 2020-12-03 NOTE — DIABETES MGMT
[facility] SPECIALIST CONSULT NOTE    Presentation   Jan Lake is a 67 y.o. female admitted 11/3/20 with high blood sugar and altered mental status. EMS reported that family called due to patient being confused and glucose meter reading \"HIGH. \" Patient is known to this hospital for frequent admissions due to high blood glucose and DKA. Blood glucose per EMS was 557. CXR negative in the ED. COVID  And flu test negative. ABG:       Subjective   Diabetes: Patient has known diabetes, treated with insulin PTA. Admission  and A1c 9.6% indicate poor diabetes control. Patient previously lived in her own home with one of her son's who has special needs. This previous hospital admission she was discharged to her daughter's home with [facility] supposedly. Although per CM note who followed up with daughter, patient had been at her personal home for the weekend and care aides were scheduled to come 5 days a week. Patient daughter stated that it would be her mother's decision whether she came back to patient's daughters home or goes back to her home at discharge. Consulted by Provider for advanced diabetes nursing assessment and care, specifically related to   [x]?         Home management assessment     Diabetes-related medical history  Acute complications  DKA  Neurological complications  Hypoglycemia unawareness  Macrovascular disease  CAD and Cerebral vascular accident    Objective   Physical exam  Vital Signs   Visit Vitals  BP (!) 146/70 (BP 1 Location: Right arm)   Pulse 64   Temp 98 °F (36.7 °C)   Resp 18   Ht 5' 3.5\" (1.613 m)   Wt 54.4 kg (120 lb)   SpO2 100%   BMI 20.92 kg/m²     Laboratory  Lab Results   Component Value Date/Time    Hemoglobin A1c 9.6 (H) 11/30/2020 01:51 PM     Lab Results   Component Value Date/Time    LDL, calculated 69.8 11/07/2020 02:16 AM     Lab Results   Component Value Date/Time    Creatinine (POC) 1.2 10/20/2020 06:00 PM    Creatinine 0.55 12/03/2020 05:01 AM     Lab Results   Component Value Date/Time    Sodium 139 12/03/2020 05:01 AM    Potassium 3.4 (L) 12/03/2020 05:01 AM    Chloride 108 12/03/2020 05:01 AM    CO2 26 12/03/2020 05:01 AM    Anion gap 5 12/03/2020 05:01 AM    Glucose 91 12/03/2020 05:01 AM    BUN 12 12/03/2020 05:01 AM    Creatinine 0.55 12/03/2020 05:01 AM    BUN/Creatinine ratio 22 (H) 12/03/2020 05:01 AM    GFR est AA >60 12/03/2020 05:01 AM    GFR est non-AA >60 12/03/2020 05:01 AM    Calcium 7.9 (L) 12/03/2020 05:01 AM    Bilirubin, total 0.2 12/01/2020 04:35 AM    Alk. phosphatase 113 12/01/2020 04:35 AM    Protein, total 5.9 (L) 12/01/2020 04:35 AM    Albumin 2.9 (L) 12/01/2020 04:35 AM    Globulin 3.0 12/01/2020 04:35 AM    A-G Ratio 1.0 (L) 12/01/2020 04:35 AM    ALT (SGPT) 39 12/01/2020 04:35 AM     Lab Results   Component Value Date/Time    ALT (SGPT) 39 12/01/2020 04:35 AM     Blood glucose pattern      Assessment and Plan   Nursing Diagnosis Risk for unstable blood glucose pattern   Nursing Intervention Domain 7699 Decision-making Support   Nursing Interventions Examined current inpatient diabetes control   Explored factors facilitating and impeding inpatient management     Evaluation   This  female, with diabetes, did not achieve diabetes control prior to admission (PTA), as evidenced by admission BG of 695 and A1c of 9.6%. Based on assessment of diabetes self-care practices, interventions that warrant action while hospitalized include:              [x]? Healthy Eating                         []?        Problem Solving  []? Being Active                            [x]? Healthy Coping  [x]? Monitoring                               [x]? Reducing Risks  [x]? Taking Medications     During this hospitalization, the patient has not achieved inpatient blood glucose target of 100-180mg/dl. She was initiated on Glucostabilizer on 11/30/20 at 1600.   She was transitioned off of GS on 12/1/20 with 10 units of Lantus Daily (0.2units/kg/D). Yesterday orders for a one time dose of 5 units of NPH in order to have an impact on BG until next bedtime basal dose is given. Basal insulin increased to 0.3units/kg/D. Dr. Tony Gaines involved in patient case, so will sign off. Recommendations   Recommend: Will sign off at this time    Billing Code(s)   I personally reviewed chart, notes, data and current medications in the medical record, and examined the patient at bedside before making care recommendations.            [x] [id number] IP subsequent hospital care - 15 minutes    Avelina Busby MSN, RN, ACCNS-AG  Avelina Busby, CNS  Access via [address] [place] [address]

## 2020-12-03 NOTE — PROGRESS NOTES
Problem: Falls - Risk of  Goal: *Absence of Falls  Outcome: Progressing Towards Goal  Note: Fall Risk Interventions:  Mobility Interventions: Bed/chair exit alarm, Patient to call before getting OOB    Mentation Interventions: Bed/chair exit alarm, Door open when patient unattended, Reorient patient, Toileting rounds         Elimination Interventions: Bed/chair exit alarm, Patient to call for help with toileting needs, Call light in reach              Problem: Falls - Risk of  Goal: *Absence of Falls  Outcome: Progressing Towards Goal  Note: Fall Risk Interventions:  Mobility Interventions: Bed/chair exit alarm, Patient to call before getting OOB    Mentation Interventions: Bed/chair exit alarm, Door open when patient unattended, Reorient patient, Toileting rounds         Elimination Interventions: Bed/chair exit alarm, Patient to call for help with toileting needs, Call light in reach              Problem: Patient Education: Go to Patient Education Activity  Goal: Patient/Family Education  Outcome: Progressing Towards Goal     Problem: Patient Education: Go to Patient Education Activity  Goal: Patient/Family Education  Outcome: Progressing Towards Goal

## 2020-12-03 NOTE — PROGRESS NOTES
Problem: Mobility Impaired (Adult and Pediatric)  Goal: *Acute Goals and Plan of Care (Insert Text)  Description: FUNCTIONAL STATUS PRIOR TO ADMISSION: Patient unable to provide history, but chart review indicates patient had been staying with her daughter after last discharge and had just gone back to her house (where she lives with her son), was scheduled for home health therapy and personal care aides    HOME SUPPORT PRIOR TO ADMISSION: Patient requires assist with ADLs. Physical Therapy Goals  Initiated 12/3/2020  1. Patient will move from supine to sit and sit to supine  in bed with supervision/set-up within 7 day(s). 2.  Patient will transfer from bed to chair and chair to bed with supervision/set-up using the least restrictive device within 7 day(s). 3.  Patient will perform sit to stand with supervision/set-up within 7 day(s). 4.  Patient will ambulate with minimal assistance/contact guard assist for 150 feet with the least restrictive device within 7 day(s). 5.  Patient will ascend/descend 2 stairs with  handrail(s) with minimal assistance/contact guard assist within 7 day(s). Outcome: Not Met   PHYSICAL THERAPY EVALUATION  Patient: Arina Olmstead (66 y.o. female)  Date: 12/3/2020  Primary Diagnosis: DKA (diabetic ketoacidoses) ([facility] [address] [E11.10]        Precautions: falls       ASSESSMENT  Based on the objective data described below, the patient presents with mild confusion, decreased tolerance of activity, general weakness and impaired ability to transfer and ambulate. Patient received in bed and agreeable to participate, is pleasant and very conversational but not a good historian. Patient able to come to sit on edge of bed with CGA and sitting balance is intact.   RN requested a weight so patient stepped on scale with min assist  , was briefly able to stand unsupported  then stepped  off scale with hand held assist.  Patient ambulated to chair with CGA x2 (hand held), assisted with changing gown  and was left with call bell in reach. Patient is likely close to baseline and should be able to discharge home with family and resumption of [facility] services. Current Level of Function Impacting Discharge (mobility/balance): CGA to min assist    Functional Outcome Measure: The patient scored 40/100 on the Barthel outcome measure which is indicative of moderate functional impairment     Other factors to consider for discharge: frequent hospital stays, poor management of DM, falls risk     Patient will benefit from skilled therapy intervention to address the above noted impairments. PLAN :  Recommendations and Planned Interventions: bed mobility training, transfer training, gait training, therapeutic exercises, patient and family training/education and therapeutic activities      Frequency/Duration: Patient will be followed by physical therapy:  4 times a week to address goals. Recommendation for discharge: (in order for the patient to meet his/her long term goals)  Physical therapy at least 2 days/week in the home AND ensure assist and/or supervision for safety with mobility    This discharge recommendation:  A follow-up discussion with the attending provider and/or case management is planned    IF patient discharges home will need the following DME: to be determined (TBD)         SUBJECTIVE:   Patient stated Mir Tatiana you so much for everything.     OBJECTIVE DATA SUMMARY:   HISTORY:    Past Medical History:   Diagnosis Date    Diabetes ([facility] [address]     Heart failure ([facility] [address]     unknown to family    Hypertension     Stroke [facility])      Past Surgical History:   Procedure Laterality Date    HX GYN      HX HEENT      thyroidectomy    HX HYSTERECTOMY      REMOVAL GALLBLADDER      THYROIDECTOMY         Personal factors and/or comorbidities impacting plan of care: med compliance    Home Situation  Home Environment: Private residence  # Steps to Enter: 1  One/Two Story Residence: Two story  Living Alone: No  Support Systems: Family member(s)  Patient Expects to be Discharged to[de-identified] Private residence  Current DME Used/Available at Home: amy Vega    EXAMINATION/PRESENTATION/DECISION MAKING:   Critical Behavior:  Neurologic State: Alert  Orientation Level: Disoriented to place, Disoriented to situation, Disoriented to time, Oriented to place  Cognition: Follows commands     Hearing: Auditory  Auditory Impairment: None  Range Of Motion:  AROM: Generally decreased, functional                       Strength:    Strength: Generally decreased, functional                    Tone & Sensation:   Tone: Normal                              Coordination:  Coordination: Within functional limits  Vision:      Functional Mobility:  Bed Mobility:  Rolling: Contact guard assistance  Supine to Sit: Contact guard assistance        Transfers:  Sit to Stand: Contact guard assistance  Stand to Sit: Contact guard assistance        Bed to Chair: Contact guard assistance              Balance:   Sitting: Intact  Standing: Impaired  Standing - Static: Constant support;Good  Standing - Dynamic : Constant support; Fair  Ambulation/Gait Training:  Distance (ft): 5 Feet (ft)  Assistive Device: Gait belt  Ambulation - Level of Assistance: Contact guard assistance        Gait Abnormalities: Decreased step clearance              Speed/Deepika: Pace decreased (<100 feet/min)                        Stairs:              Functional Measure:  Barthel Index:    Bathin  Bladder: 0  Bowels: 5  Groomin  Dressin  Feeding: 10  Mobility: 0  Stairs: 0  Toilet Use: 5  Transfer (Bed to Chair and Back): 10  Total: 40/100       The Barthel ADL Index: Guidelines  1. The index should be used as a record of what a patient does, not as a record of what a patient could do. 2. The main aim is to establish degree of independence from any help, physical or verbal, however minor and for whatever reason.   3. The need for supervision renders the patient not independent. 4. A patient's performance should be established using the best available evidence. Asking the patient, friends/relatives and nurses are the usual sources, but direct observation and common sense are also important. However direct testing is not needed. 5. Usually the patient's performance over the preceding 24-48 hours is important, but occasionally longer periods will be relevant. 6. Middle categories imply that the patient supplies over 50 per cent of the effort. 7. Use of aids to be independent is allowed. Alena Santana., Barthel, D.W. (1399). Functional evaluation: the Barthel Index. [address] [facility] (14)2. Carlyn Arroyo mathieu LINDA Diehl, Carl Cleary., Luke Erazo., [place], [address] [place] [address] Ave (1999). Measuring the change indisability after inpatient rehabilitation; comparison of the responsiveness of the Barthel Index and Functional [place] Measure. Journal of Neurology, Neurosurgery, and Psychiatry, 66(4), 652-064. Royal Abraham, N.[place].A, BRENNA Mckay, & Chelle Gilmore M.A. (2004.) Assessment of post-stroke quality of life in cost-effectiveness studies: The usefulness of the Barthel Index and the EuroQoL-5D.  Quality of Life Research, 15, 044-34          Physical Therapy Evaluation Charge Determination   History Examination Presentation Decision-Making   MEDIUM  Complexity : 1-2 comorbidities / personal factors will impact the outcome/ POC  LOW Complexity : 1-2 Standardized tests and measures addressing body structure, function, activity limitation and / or participation in recreation  LOW Complexity : Stable, uncomplicated  LOW Complexity : FOTO score of       Based on the above components, the patient evaluation is determined to be of the following complexity level: LOW     Pain Rating:      Activity Tolerance:   Good    After treatment patient left in no apparent distress:   Sitting in chair, Call bell within reach and Bed / chair alarm activated    COMMUNICATION/EDUCATION:   The patients plan of care was discussed with: Registered nurse. Fall prevention education was provided and the patient/caregiver indicated understanding. and Patient/family agree to work toward stated goals and plan of care.     Thank you for this referral.  Francisca Martines, PT   Time Calculation: 25 mins

## 2020-12-03 NOTE — PROGRESS NOTES
RAPID RESPONSE TEAM- Follow Up     Rounded on patient due to recent transfer from CCU; initially admitted with DKA. Anion gap closed. VSS. Spoke with primary RN. No acute concerns at this time. Patient is sitting up in chair, alert, NAD. No RRT interventions indicated at this time. Please call back if needed.      Jostin Portillo RN  RRT  Ext. [phone]

## 2020-12-03 NOTE — PROGRESS NOTES
General Daily Progress Note    Admit Date: 11/30/2020    Subjective:     Patient has no complaint . Cali Hurtado Current Facility-Administered Medications   Medication Dose Route Frequency    influenza vaccine 2020-21 (6 mos+)(PF) (FLUARIX/FLULAVAL/FLUZONE QUAD) injection 0.5 mL  0.5 mL IntraMUSCular PRIOR TO DISCHARGE    potassium bicarb-citric acid (EFFER-K) tablet 20 mEq  20 mEq Oral BID    insulin glargine (LANTUS) injection 16 Units  16 Units SubCUTAneous QHS    insulin lispro (HUMALOG) injection 4 Units  4 Units SubCUTAneous TIDAC    clopidogreL (PLAVIX) tablet 75 mg  75 mg Oral DAILY    levothyroxine (SYNTHROID) tablet 175 mcg  175 mcg Oral ACB    metoprolol tartrate (LOPRESSOR) tablet 12.5 mg  12.5 mg Oral Q12H    sodium chloride (NS) flush 5-40 mL  5-40 mL IntraVENous Q8H    sodium chloride (NS) flush 5-40 mL  5-40 mL IntraVENous PRN    acetaminophen (TYLENOL) tablet 650 mg  650 mg Oral Q6H PRN    Or    acetaminophen (TYLENOL) suppository 650 mg  650 mg Rectal Q6H PRN    polyethylene glycol (MIRALAX) packet 17 g  17 g Oral DAILY PRN    promethazine (PHENERGAN) tablet 12.5 mg  12.5 mg Oral Q6H PRN    Or    ondansetron (ZOFRAN) injection 4 mg  4 mg IntraVENous Q6H PRN    enoxaparin (LOVENOX) injection 30 mg  30 mg SubCUTAneous DAILY        Review of Systems  A comprehensive review of systems was negative.     Objective:     Patient Vitals for the past 24 hrs:   BP Temp Pulse Resp SpO2   12/03/20 0757 (!) 146/70 98 °F (36.7 °C) 64 18 100 %   12/03/20 0509 (!) 161/73 98 °F (36.7 °C) 60 18 96 %   12/02/20 2305 (!) 160/70 98.7 °F (37.1 °C) 70 18 95 %   12/02/20 2028 (!) 164/76 98.2 °F (36.8 °C) 76 18 100 %   12/02/20 1622 (!) 149/70 98.1 °F (36.7 °C) 70 16 100 %   12/02/20 1500 132/79  74 12 100 %   12/02/20 1400 123/70  76 12 100 %   12/02/20 1300 (!) 137/37  70 18 99 %   12/02/20 1200 (!) 148/60 98 °F (36.7 °C) 71 8 100 %   12/02/20 1100 (!) 150/63  66 20 100 %   12/02/20 1000 (!) 130/53  64 13 100 % 12/02/20 0900 (!) 157/44  74 18 100 %     No intake/output data recorded. 12/01 1901 - 12/03 0700  In: -   Out: 1780 [Urine:1780]    Physical Exam:   Visit Vitals  BP (!) 146/70 (BP 1 Location: Right arm)   Pulse 64   Temp 98 °F (36.7 °C)   Resp 18   Ht 5' 3.5\" (1.613 m)   Wt 120 lb (54.4 kg)   SpO2 100%   BMI 20.92 kg/m²     General appearance: alert, cooperative, no distress, appears stated age  Neck: supple, symmetrical, trachea midline, no adenopathy, thyroid: not enlarged, symmetric, no tenderness/mass/nodules, no carotid bruit and no JVD  Lungs: clear to auscultation bilaterally  Heart: regular rate and rhythm, S1, S2 normal, no murmur, click, rub or gallop  Abdomen: soft, non-tender. Bowel sounds normal. No masses,  no organomegaly  Extremities: extremities normal, atraumatic, no cyanosis or edema    Assessment:     Active Problems:    DKA (diabetic ketoacidoses) ([facility] [address] (6/2/2017)        Plan:     1. Continue diabetic control. Will change basal insulin to morning starting tomorrow. She fares better with a.m. insulin. Continue prandial coverage. 2.  We will mobilize. 3.  Adequate hydration. Correct hyponatremia.

## 2020-12-04 LAB
ANION GAP SERPL CALC-SCNC: 3 MMOL/L (ref 5–15)
BUN SERPL-MCNC: 12 MG/DL (ref 6–20)
BUN/CREAT SERPL: 24 (ref 12–20)
CALCIUM SERPL-MCNC: 7.8 MG/DL (ref 8.5–10.1)
CHLORIDE SERPL-SCNC: 106 MMOL/L (ref 97–108)
CO2 SERPL-SCNC: 29 MMOL/L (ref 21–32)
CREAT SERPL-MCNC: 0.51 MG/DL (ref 0.55–1.02)
GLUCOSE BLD STRIP.AUTO-MCNC: 154 MG/DL (ref 65–100)
GLUCOSE BLD STRIP.AUTO-MCNC: 165 MG/DL (ref 65–100)
GLUCOSE BLD STRIP.AUTO-MCNC: 182 MG/DL (ref 65–100)
GLUCOSE BLD STRIP.AUTO-MCNC: 42 MG/DL (ref 65–100)
GLUCOSE BLD STRIP.AUTO-MCNC: 46 MG/DL (ref 65–100)
GLUCOSE BLD STRIP.AUTO-MCNC: 54 MG/DL (ref 65–100)
GLUCOSE SERPL-MCNC: 100 MG/DL (ref 65–100)
POTASSIUM SERPL-SCNC: 3.6 MMOL/L (ref 3.5–5.1)
SERVICE CMNT-IMP: ABNORMAL
SODIUM SERPL-SCNC: 138 MMOL/L (ref 136–145)

## 2020-12-04 PROCEDURE — 97110 THERAPEUTIC EXERCISES: CPT

## 2020-12-04 PROCEDURE — 97116 GAIT TRAINING THERAPY: CPT

## 2020-12-04 PROCEDURE — 80048 BASIC METABOLIC PNL TOTAL CA: CPT

## 2020-12-04 PROCEDURE — 74011636637 HC RX REV CODE- 636/637: Performed by: INTERNAL MEDICINE

## 2020-12-04 PROCEDURE — 74011250636 HC RX REV CODE- 250/636: Performed by: INTERNAL MEDICINE

## 2020-12-04 PROCEDURE — 65270000015 HC RM PRIVATE ONCOLOGY

## 2020-12-04 PROCEDURE — 82962 GLUCOSE BLOOD TEST: CPT

## 2020-12-04 PROCEDURE — 36415 COLL VENOUS BLD VENIPUNCTURE: CPT

## 2020-12-04 PROCEDURE — 74011250637 HC RX REV CODE- 250/637: Performed by: INTERNAL MEDICINE

## 2020-12-04 RX ORDER — INSULIN LISPRO 100 [IU]/ML
3 INJECTION, SOLUTION INTRAVENOUS; SUBCUTANEOUS
Status: DISCONTINUED | OUTPATIENT
Start: 2020-12-04 | End: 2020-12-06 | Stop reason: HOSPADM

## 2020-12-04 RX ORDER — INSULIN LISPRO 100 [IU]/ML
4 INJECTION, SOLUTION INTRAVENOUS; SUBCUTANEOUS
Status: DISCONTINUED | OUTPATIENT
Start: 2020-12-04 | End: 2020-12-06 | Stop reason: HOSPADM

## 2020-12-04 RX ORDER — INSULIN GLARGINE 100 [IU]/ML
16 INJECTION, SOLUTION SUBCUTANEOUS DAILY
Status: DISCONTINUED | OUTPATIENT
Start: 2020-12-05 | End: 2020-12-06 | Stop reason: HOSPADM

## 2020-12-04 RX ORDER — DEXTROSE 50 % IN WATER (D50W) INTRAVENOUS SYRINGE
12.5-25 AS NEEDED
Status: DISCONTINUED | OUTPATIENT
Start: 2020-12-04 | End: 2020-12-06 | Stop reason: HOSPADM

## 2020-12-04 RX ORDER — MAGNESIUM SULFATE 100 %
4 CRYSTALS MISCELLANEOUS AS NEEDED
Status: DISCONTINUED | OUTPATIENT
Start: 2020-12-04 | End: 2020-12-06 | Stop reason: HOSPADM

## 2020-12-04 RX ADMIN — INSULIN LISPRO 3 UNITS: 100 INJECTION, SOLUTION INTRAVENOUS; SUBCUTANEOUS at 17:40

## 2020-12-04 RX ADMIN — Medication 10 ML: at 17:41

## 2020-12-04 RX ADMIN — CLOPIDOGREL BISULFATE 75 MG: 75 TABLET ORAL at 09:52

## 2020-12-04 RX ADMIN — METOPROLOL TARTRATE 12.5 MG: 25 TABLET, FILM COATED ORAL at 22:35

## 2020-12-04 RX ADMIN — METOPROLOL TARTRATE 12.5 MG: 25 TABLET, FILM COATED ORAL at 09:52

## 2020-12-04 RX ADMIN — INSULIN LISPRO 4 UNITS: 100 INJECTION, SOLUTION INTRAVENOUS; SUBCUTANEOUS at 12:41

## 2020-12-04 RX ADMIN — Medication 10 ML: at 22:37

## 2020-12-04 RX ADMIN — LEVOTHYROXINE SODIUM 175 MCG: 0.03 TABLET ORAL at 09:51

## 2020-12-04 RX ADMIN — ENOXAPARIN SODIUM 30 MG: 30 INJECTION SUBCUTANEOUS at 09:51

## 2020-12-04 NOTE — PROGRESS NOTES
Problem: Mobility Impaired (Adult and Pediatric)  Goal: *Acute Goals and Plan of Care (Insert Text)  Description: FUNCTIONAL STATUS PRIOR TO ADMISSION: Patient unable to provide history, but chart review indicates patient had been staying with her daughter after last discharge and had just gone back to her house (where she lives with her son), was scheduled for home health therapy and personal care aides    HOME SUPPORT PRIOR TO ADMISSION: Patient requires assist with ADLs. Physical Therapy Goals  Initiated 12/3/2020  1. Patient will move from supine to sit and sit to supine  in bed with supervision/set-up within 7 day(s). 2.  Patient will transfer from bed to chair and chair to bed with supervision/set-up using the least restrictive device within 7 day(s). 3.  Patient will perform sit to stand with supervision/set-up within 7 day(s). 4.  Patient will ambulate with minimal assistance/contact guard assist for 150 feet with the least restrictive device within 7 day(s). 5.  Patient will ascend/descend 2 stairs with  handrail(s) with minimal assistance/contact guard assist within 7 day(s). Outcome: Progressing Towards Goal   PHYSICAL THERAPY TREATMENT  Patient: Hetal Clark (17 y.o. female)  Date: 12/4/2020  Diagnosis: DKA (diabetic ketoacidoses) ([facility] [address] [E11.10]   <principal problem not specified>       Precautions:    Chart, physical therapy assessment, plan of care and goals were reviewed. ASSESSMENT  Patient continues with skilled PT services and is progressing towards goals. Pt is S for rolling, SBA to EOB. She is SBA for tranfers. She amb 150' x2 with CGA to SBA and no device; no c/o SOB or distress. Pt very pleasant and cooperative, occasional cues for safety. Pt returned to chair and able to tolerate AROM LE exercises hip flexion, knee ext, hip add/abd, ankle pumps and UE elevation combo with breathing exercise all x10 reps.     Current Level of Function Impacting Discharge (mobility/balance): CGA to S    Other factors to consider for discharge: should have supervision of family for safety but likely returning very close to her baseline at this time         PLAN :  Patient continues to benefit from skilled intervention to address the above impairments. Continue treatment per established plan of care. to address goals. Recommendation for discharge: (in order for the patient to meet his/her long term goals)  Physical therapy at least 2 days/week in the home AND ensure assist and/or supervision for safety with mobility/gait    This discharge recommendation:  Has not yet been discussed the attending provider and/or case management    IF patient discharges home will need the following DME: patient owns DME required for discharge       SUBJECTIVE:   Patient stated Joni Lópezjohnathan you so much! Nataliia Lerma    OBJECTIVE DATA SUMMARY:   Critical Behavior:  Neurologic State: Confused, Alert  Orientation Level: Oriented to person  Cognition: Decreased command following     Functional Mobility Training:  Bed Mobility:  Rolling: Supervision  Supine to Sit: Stand-by assistance              Transfers:  Sit to Stand: Stand-by assistance  Stand to Sit: Stand-by assistance        Bed to Chair: Stand-by assistance                    Balance:  Sitting: Intact  Standing: Impaired  Standing - Static: Good  Standing - Dynamic : Fair  Ambulation/Gait Training:  Distance (ft): 150 Feet (ft)(x2)  Assistive Device: Gait belt  Ambulation - Level of Assistance: Contact guard assistance;Stand-by assistance;Assist x1        Gait Abnormalities: Decreased step clearance              Speed/Deepika: Slow          Pain Rating:  No c/o    Activity Tolerance:   Good    After treatment patient left in no apparent distress:   Sitting in chair, Call bell within reach, and Bed / chair alarm activated    COMMUNICATION/COLLABORATION:   The patients plan of care was discussed with: Registered nurse.      Lew 45, PT   Time Calculation: 27 mins

## 2020-12-04 NOTE — PROGRESS NOTES
Oncology End of Shift Note      Bedside shift change report given to Shanelle Douglas RN (incoming nurse) by Kellie Dukes RN (outgoing nurse) on [facility]. Report included the following information SBAR. Shift Summary: safety, bed alarm,berry      Issues for Physician to Address:       Patient on Cardiac Monitoring?     [x] Yes  [] No    Rhythm: NSR     Madhu Scale:     Madhu Score: 13        If Madhu Scale less than 15   Patient Mobility Ordered  No  Speciality Bed Ordered   No     Boots Applied                  No      Shift Events        Jarrod Cuello, VIN

## 2020-12-04 NOTE — PROGRESS NOTES
Oncology End of Shift Note      Bedside shift change report given to Chacho Velazquez RN (incoming nurse) by Deepthi Park (outgoing nurse) on [facility]. Report included the following information SBAR, Kardex and MAR. Shift Summary:   Patient did not complain of any pain during my shift. All scheduled medications have been given, see MAR. Patient's IV has been flushed and is patent. Patient is up with the assistance of one from the recliner to the bed. Patient was very pleasant and courteous throughout my shift. Patient teaching and routine rounding has been done. Issues for Physician to Address:       Patient on Cardiac Monitoring?     [x] Yes  [] No    Rhythm: NSR     Madhu Scale:     Madhu Score: (P) 20        If Madhu Scale less than 15   Patient Mobility Ordered    Speciality Bed Ordered        Boots Applied                        Shift Events        Deepthi Park

## 2020-12-04 NOTE — PROGRESS NOTES
General Daily Progress Note    Admit Date: 11/30/2020    Subjective:     Patient has no complaint . Selma Severance Current Facility-Administered Medications   Medication Dose Route Frequency    [START ON 12/5/2020] insulin glargine (LANTUS) injection 16 Units  16 Units SubCUTAneous DAILY    insulin lispro (HUMALOG) injection 3 Units  3 Units SubCUTAneous ACB    insulin lispro (HUMALOG) injection 4 Units  4 Units SubCUTAneous ACL    insulin lispro (HUMALOG) injection 3 Units  3 Units SubCUTAneous ACD    glucose chewable tablet 16 g  4 Tab Oral PRN    dextrose (D50W) injection syrg 12.5-25 g  12.5-25 g IntraVENous PRN    glucagon (GLUCAGEN) injection 1 mg  1 mg IntraMUSCular PRN    influenza vaccine 2020-21 (6 mos+)(PF) (FLUARIX/FLULAVAL/FLUZONE QUAD) injection 0.5 mL  0.5 mL IntraMUSCular PRIOR TO DISCHARGE    clopidogreL (PLAVIX) tablet 75 mg  75 mg Oral DAILY    levothyroxine (SYNTHROID) tablet 175 mcg  175 mcg Oral ACB    metoprolol tartrate (LOPRESSOR) tablet 12.5 mg  12.5 mg Oral Q12H    sodium chloride (NS) flush 5-40 mL  5-40 mL IntraVENous Q8H    sodium chloride (NS) flush 5-40 mL  5-40 mL IntraVENous PRN    acetaminophen (TYLENOL) tablet 650 mg  650 mg Oral Q6H PRN    Or    acetaminophen (TYLENOL) suppository 650 mg  650 mg Rectal Q6H PRN    polyethylene glycol (MIRALAX) packet 17 g  17 g Oral DAILY PRN    promethazine (PHENERGAN) tablet 12.5 mg  12.5 mg Oral Q6H PRN    Or    ondansetron (ZOFRAN) injection 4 mg  4 mg IntraVENous Q6H PRN    enoxaparin (LOVENOX) injection 30 mg  30 mg SubCUTAneous DAILY        Review of Systems  A comprehensive review of systems was negative.     Objective:     Patient Vitals for the past 24 hrs:   BP Temp Pulse Resp SpO2 Weight   12/04/20 0841 130/66 98.3 °F (36.8 °C) 70 18 99 %    12/04/20 0315 128/60 98.2 °F (36.8 °C) 74 18 97 %    12/03/20 2300 (!) 133/56 98 °F (36.7 °C) 70 18 100 %    12/03/20 1945 124/88 97.9 °F (36.6 °C) 77 18 100 %    12/03/20 5983 (!) 151/57 98 °F (36.7 °C) 74 18 100 %    12/03/20 1157 (!) 162/60 97.3 °F (36.3 °C) 66 18 100 %    12/03/20 1123      138 lb 3.7 oz (62.7 kg)     No intake/output data recorded. 12/02 1901 - 12/04 0700  In: -   Out: 2300 [Urine:2300]    Physical Exam:   Visit Vitals  /66 (BP 1 Location: Right arm, BP Patient Position: At rest)   Pulse 70   Temp 98.3 °F (36.8 °C)   Resp 18   Ht 5' 3.5\" (1.613 m)   Wt 138 lb 3.7 oz (62.7 kg)   SpO2 99%   BMI 24.10 kg/m²     General appearance: alert, cooperative, no distress, appears stated age  Neck: supple, symmetrical, trachea midline, no adenopathy, thyroid: not enlarged, symmetric, no tenderness/mass/nodules, no carotid bruit and no JVD  Lungs: clear to auscultation bilaterally  Heart: regular rate and rhythm, S1, S2 normal, no murmur, click, rub or gallop  Abdomen: soft, non-tender. Bowel sounds normal. No masses,  no organomegaly  Extremities: extremities normal, atraumatic, no cyanosis or nitin    Assessment:     Active Problems:    DKA (diabetic ketoacidoses) ([facility] [address] (6/2/2017)        Plan:   1. Continue diabetic control. Will transition patient to basal insulin every morning since this appears to be better for her than the at bedtime dosing. Unfortunately this will take an additional day and hopefully stabilization will occur to the point where she can be discharged home Sunday. 2.  Hydration is adequate. 3.  We will mobilize.   **

## 2020-12-05 LAB
GLUCOSE BLD STRIP.AUTO-MCNC: 213 MG/DL (ref 65–100)
GLUCOSE BLD STRIP.AUTO-MCNC: 264 MG/DL (ref 65–100)
GLUCOSE BLD STRIP.AUTO-MCNC: 292 MG/DL (ref 65–100)
GLUCOSE BLD STRIP.AUTO-MCNC: 327 MG/DL (ref 65–100)
SERVICE CMNT-IMP: ABNORMAL

## 2020-12-05 PROCEDURE — 74011250636 HC RX REV CODE- 250/636: Performed by: INTERNAL MEDICINE

## 2020-12-05 PROCEDURE — 74011250637 HC RX REV CODE- 250/637: Performed by: INTERNAL MEDICINE

## 2020-12-05 PROCEDURE — 65270000015 HC RM PRIVATE ONCOLOGY

## 2020-12-05 PROCEDURE — 82962 GLUCOSE BLOOD TEST: CPT

## 2020-12-05 PROCEDURE — 94760 N-INVAS EAR/PLS OXIMETRY 1: CPT

## 2020-12-05 PROCEDURE — 74011636637 HC RX REV CODE- 636/637: Performed by: INTERNAL MEDICINE

## 2020-12-05 RX ADMIN — INSULIN LISPRO 3 UNITS: 100 INJECTION, SOLUTION INTRAVENOUS; SUBCUTANEOUS at 16:58

## 2020-12-05 RX ADMIN — INSULIN LISPRO 3 UNITS: 100 INJECTION, SOLUTION INTRAVENOUS; SUBCUTANEOUS at 09:03

## 2020-12-05 RX ADMIN — INSULIN GLARGINE 16 UNITS: 100 INJECTION, SOLUTION SUBCUTANEOUS at 09:03

## 2020-12-05 RX ADMIN — INSULIN LISPRO 4 UNITS: 100 INJECTION, SOLUTION INTRAVENOUS; SUBCUTANEOUS at 12:05

## 2020-12-05 RX ADMIN — Medication 10 ML: at 23:09

## 2020-12-05 RX ADMIN — Medication 10 ML: at 07:40

## 2020-12-05 RX ADMIN — Medication 10 ML: at 16:59

## 2020-12-05 RX ADMIN — LEVOTHYROXINE SODIUM 175 MCG: 0.03 TABLET ORAL at 09:04

## 2020-12-05 RX ADMIN — CLOPIDOGREL BISULFATE 75 MG: 75 TABLET ORAL at 09:04

## 2020-12-05 RX ADMIN — METOPROLOL TARTRATE 12.5 MG: 25 TABLET, FILM COATED ORAL at 23:08

## 2020-12-05 RX ADMIN — METOPROLOL TARTRATE 12.5 MG: 25 TABLET, FILM COATED ORAL at 09:04

## 2020-12-05 RX ADMIN — ENOXAPARIN SODIUM 30 MG: 30 INJECTION SUBCUTANEOUS at 09:05

## 2020-12-05 NOTE — PROGRESS NOTES
Oncology End of Shift Note      Bedside shift change report given to Luis Felipe Brady RN (incoming nurse) by Jozef Barraza (outgoing nurse) on [facility]. Report included the following information SBAR, Kardex and MAR. Shift Summary:   Patient has been pleasantly confused during my shift. Patient attempted to walk in the hallway, Patient was redirected to her bed because she is very weak in the legs. Patient's IV has been flushed and is patent. Patient has been resting comfortably for the majority of the latter hours of my shift. Patient teaching and routine rounding has been done. Issues for Physician to Address:       Patient on Cardiac Monitoring?     [x] Yes  [] No    Rhythm: NSR     Madhu Scale:     Madhu Score: 19        If Madhu Scale less than 15   Patient Mobility Ordered    Speciality Bed Ordered        Boots Applied                        Shift Events        Jozef Barraza

## 2020-12-05 NOTE — PROGRESS NOTES
End of Shift Note    Bedside shift change report given to almita (oncoming nurse) by Sierra Reece RN (offgoing nurse). Report included the following information SBAR, Kardex, ED Summary and MAR    Shift worked:  AM     Shift summary and any significant changes:     D/c berry; up with PT/OT; BG low this morning, spoke with MD Jose García who put in PRN hypoglycemic meds, patient BG stable throughout rest of shift; insulin coverage provided     Concerns for physician to address:  no      Zone phone for oncoming shift:   [phone]       Activity:  Activity Level: Up with Assistance  Number times ambulated in hallways past shift: 0  Number of times OOB to chair past shift: 2    Cardiac:   Cardiac Monitoring: Yes      Cardiac Rhythm: Normal sinus rhythm    Access:   Current line(s): PIV     Genitourinary:   Urinary status: incontinent    Respiratory:   O2 Device: Room air  Chronic home O2 use?: NO  Incentive spirometer at bedside: NO     GI:  Last Bowel Movement Date: 12/02/20  Current diet:  DIET DIABETIC CONSISTENT CARB Regular  Passing flatus: YES  Tolerating current diet: YES  % Diet Eaten: 90 %    Pain Management:   Patient states pain is manageable on current regimen: YES    Skin:  Madhu Score: 18  Interventions: increase time out of bed and PT/OT consult    Patient Safety:  Fall Score:  Total Score: 3  Interventions: bed/chair alarm and pt to call before getting OOB  High Fall Risk: Yes    Length of Stay:  Expected LOS: 3d 19h  Actual LOS: [address], RN

## 2020-12-05 NOTE — PROGRESS NOTES
General Daily Progress Note    Admit Date: 11/30/2020    Subjective:     Patient has no complaint . Ezio Phillip Current Facility-Administered Medications   Medication Dose Route Frequency    insulin glargine (LANTUS) injection 16 Units  16 Units SubCUTAneous DAILY    insulin lispro (HUMALOG) injection 3 Units  3 Units SubCUTAneous ACB    insulin lispro (HUMALOG) injection 4 Units  4 Units SubCUTAneous ACL    insulin lispro (HUMALOG) injection 3 Units  3 Units SubCUTAneous ACD    glucose chewable tablet 16 g  4 Tab Oral PRN    dextrose (D50W) injection syrg 12.5-25 g  12.5-25 g IntraVENous PRN    glucagon (GLUCAGEN) injection 1 mg  1 mg IntraMUSCular PRN    influenza vaccine 2020-21 (6 mos+)(PF) (FLUARIX/FLULAVAL/FLUZONE QUAD) injection 0.5 mL  0.5 mL IntraMUSCular PRIOR TO DISCHARGE    clopidogreL (PLAVIX) tablet 75 mg  75 mg Oral DAILY    levothyroxine (SYNTHROID) tablet 175 mcg  175 mcg Oral ACB    metoprolol tartrate (LOPRESSOR) tablet 12.5 mg  12.5 mg Oral Q12H    sodium chloride (NS) flush 5-40 mL  5-40 mL IntraVENous Q8H    sodium chloride (NS) flush 5-40 mL  5-40 mL IntraVENous PRN    acetaminophen (TYLENOL) tablet 650 mg  650 mg Oral Q6H PRN    Or    acetaminophen (TYLENOL) suppository 650 mg  650 mg Rectal Q6H PRN    polyethylene glycol (MIRALAX) packet 17 g  17 g Oral DAILY PRN    promethazine (PHENERGAN) tablet 12.5 mg  12.5 mg Oral Q6H PRN    Or    ondansetron (ZOFRAN) injection 4 mg  4 mg IntraVENous Q6H PRN    enoxaparin (LOVENOX) injection 30 mg  30 mg SubCUTAneous DAILY        Review of Systems  A comprehensive review of systems was negative.     Objective:     Patient Vitals for the past 24 hrs:   BP Temp Pulse Resp SpO2   12/05/20 1133 (!) 144/63 98.7 °F (37.1 °C) 63 18 99 %   12/05/20 0728 (!) 155/80       12/05/20 0725 (!) 179/63 97.9 °F (36.6 °C) (!) 58 18 100 %   12/05/20 0345 (!) 156/56 98 °F (36.7 °C) 66 18 97 %   12/04/20 5705 (!) 174/58 98 °F (36.7 °C) 71 18 100 %   12/04/20 1933 (!) 110/53 98.1 °F (36.7 °C) 79 18 97 %   12/04/20 1606 132/70 98.2 °F (36.8 °C) 69 18 100 %     No intake/output data recorded. 12/03 1901 - 12/05 0700  In: -   Out: 650 [Urine:650]    Physical Exam:   Visit Vitals  BP (!) 144/63 (BP 1 Location: Right arm)   Pulse 63   Temp 98.7 °F (37.1 °C)   Resp 18   Ht 5' 3.5\" (1.613 m)   Wt 138 lb 3.7 oz (62.7 kg)   SpO2 99%   BMI 24.10 kg/m²     General appearance: alert, cooperative, no distress, appears stated age  Neck: supple, symmetrical, trachea midline, no adenopathy, thyroid: not enlarged, symmetric, no tenderness/mass/nodules, no carotid bruit and no JVD  Lungs: clear to auscultation bilaterally  Heart: regular rate and rhythm, S1, S2 normal, no murmur, click, rub or gallop  Abdomen: soft, non-tender. Bowel sounds normal. No masses,  no organomegaly  Extremities: extremities normal, atraumatic, no cyanosis or edema    Assessment:     Active Problems:    DKA (diabetic ketoacidoses) ([facility] [address] (6/2/2017)        Plan:     1. Continue diabetic control. Basal insulin change every morning which unfortunately will lead to an elevation of blood sugars today. If blood sugars are reasonable in a.m. will discharge tomorrow. 2.  She is well compensated. 3.  Progressive dementia noted.

## 2020-12-06 VITALS
HEIGHT: 64 IN | OXYGEN SATURATION: 100 % | TEMPERATURE: 97.9 F | DIASTOLIC BLOOD PRESSURE: 53 MMHG | RESPIRATION RATE: 18 BRPM | SYSTOLIC BLOOD PRESSURE: 158 MMHG | HEART RATE: 61 BPM | WEIGHT: 138.23 LBS | BODY MASS INDEX: 23.6 KG/M2

## 2020-12-06 LAB
GLUCOSE BLD STRIP.AUTO-MCNC: 71 MG/DL (ref 65–100)
SERVICE CMNT-IMP: NORMAL

## 2020-12-06 PROCEDURE — 82962 GLUCOSE BLOOD TEST: CPT

## 2020-12-06 PROCEDURE — 74011250637 HC RX REV CODE- 250/637: Performed by: INTERNAL MEDICINE

## 2020-12-06 PROCEDURE — 74011636637 HC RX REV CODE- 636/637: Performed by: INTERNAL MEDICINE

## 2020-12-06 PROCEDURE — 94760 N-INVAS EAR/PLS OXIMETRY 1: CPT

## 2020-12-06 PROCEDURE — 74011250636 HC RX REV CODE- 250/636: Performed by: INTERNAL MEDICINE

## 2020-12-06 RX ORDER — INSULIN LISPRO 100 [IU]/ML
INJECTION, SOLUTION INTRAVENOUS; SUBCUTANEOUS
Qty: 2 ADJUSTABLE DOSE PRE-FILLED PEN SYRINGE | Refills: 11 | Status: SHIPPED | OUTPATIENT
Start: 2020-12-06 | End: 2022-02-26

## 2020-12-06 RX ORDER — INSULIN DEGLUDEC INJECTION 100 U/ML
16 INJECTION, SOLUTION SUBCUTANEOUS DAILY
Qty: 2 ADJUSTABLE DOSE PRE-FILLED PEN SYRINGE | Refills: 11 | Status: SHIPPED | OUTPATIENT
Start: 2020-12-06 | End: 2022-02-15 | Stop reason: SDUPTHER

## 2020-12-06 RX ADMIN — INSULIN LISPRO 3 UNITS: 100 INJECTION, SOLUTION INTRAVENOUS; SUBCUTANEOUS at 07:30

## 2020-12-06 RX ADMIN — CLOPIDOGREL BISULFATE 75 MG: 75 TABLET ORAL at 08:41

## 2020-12-06 RX ADMIN — LEVOTHYROXINE SODIUM 175 MCG: 0.03 TABLET ORAL at 08:41

## 2020-12-06 RX ADMIN — Medication 10 ML: at 08:42

## 2020-12-06 RX ADMIN — METOPROLOL TARTRATE 12.5 MG: 25 TABLET, FILM COATED ORAL at 08:41

## 2020-12-06 RX ADMIN — ENOXAPARIN SODIUM 30 MG: 30 INJECTION SUBCUTANEOUS at 08:41

## 2020-12-06 RX ADMIN — ACETAMINOPHEN 650 MG: 325 TABLET ORAL at 08:41

## 2020-12-06 RX ADMIN — INSULIN GLARGINE 16 UNITS: 100 INJECTION, SOLUTION SUBCUTANEOUS at 08:42

## 2020-12-06 NOTE — PROGRESS NOTES
I have reviewed discharge instructions with the PATIENT PARENT GUARDIAN: patient and guardian. The patient and guardian verbalized understanding. Discharge medications reviewed with patient and guardian and appropriate educational materials and side effects teaching were provided. Follow-up appointments reviewed. Opportunity for questions and clarification was provided. Venous access removed without difficulty. Patient's belongings gathered and sent with patient. Patient is ready for discharge.      [facility]

## 2020-12-06 NOTE — PROGRESS NOTES
Problem: Falls - Risk of  Goal: *Absence of Falls  Description: Document Elaine Lim Fall Risk and appropriate interventions in the flowsheet. Outcome: Resolved/Met  Note: Fall Risk Interventions:  Mobility Interventions: Bed/chair exit alarm    Mentation Interventions: Bed/chair exit alarm    Medication Interventions: Bed/chair exit alarm    Elimination Interventions: Bed/chair exit alarm              Problem: Patient Education: Go to Patient Education Activity  Goal: Patient/Family Education  Outcome: Resolved/Met     Problem: Diabetes Self-Management  Goal: *Disease process and treatment process  Description: Define diabetes and identify own type of diabetes; list 3 options for treating diabetes. Outcome: Resolved/Met  Goal: *Incorporating nutritional management into lifestyle  Description: Describe effect of type, amount and timing of food on blood glucose; list 3 methods for planning meals. Outcome: Resolved/Met  Goal: *Incorporating physical activity into lifestyle  Description: State effect of exercise on blood glucose levels. Outcome: Resolved/Met  Goal: *Developing strategies to promote health/change behavior  Description: Define the ABC's of diabetes; identify appropriate screenings, schedule and personal plan for screenings. Outcome: Resolved/Met  Goal: *Using medications safely  Description: State effect of diabetes medications on diabetes; name diabetes medication taking, action and side effects. Outcome: Resolved/Met  Goal: *Monitoring blood glucose, interpreting and using results  Description: Identify recommended blood glucose targets  and personal targets. Outcome: Resolved/Met  Goal: *Prevention, detection, treatment of acute complications  Description: List symptoms of hyper- and hypoglycemia; describe how to treat low blood sugar and actions for lowering  high blood glucose level.   Outcome: Resolved/Met  Goal: *Prevention, detection and treatment of chronic complications  Description: Define the natural course of diabetes and describe the relationship of blood glucose levels to long term complications of diabetes. Outcome: Resolved/Met  Goal: *Developing strategies to address psychosocial issues  Description: Describe feelings about living with diabetes; identify support needed and support network  Outcome: Resolved/Met  Goal: *Insulin pump training  Outcome: Resolved/Met  Goal: *Sick day guidelines  Outcome: Resolved/Met  Goal: *Patient Specific Goal (EDIT GOAL, INSERT TEXT)  Outcome: Resolved/Met     Problem: Patient Education: Go to Patient Education Activity  Goal: Patient/Family Education  Outcome: Resolved/Met     Problem: Pressure Injury - Risk of  Goal: *Prevention of pressure injury  Description: Document Madhu Scale and appropriate interventions in the flowsheet.   Outcome: Resolved/Met  Note: Pressure Injury Interventions:  Sensory Interventions: Assess changes in LOC    Moisture Interventions: Absorbent underpads    Activity Interventions: Pressure redistribution bed/mattress(bed type)    Mobility Interventions: HOB 30 degrees or less    Nutrition Interventions: Document food/fluid/supplement intake                     Problem: Patient Education: Go to Patient Education Activity  Goal: Patient/Family Education  Outcome: Resolved/Met     Problem: Patient Education: Go to Patient Education Activity  Goal: Patient/Family Education  Outcome: Resolved/Met

## 2020-12-06 NOTE — PROGRESS NOTES
Bedside and Verbal shift change report given to troy (oncoming nurse) by inocente (offgoing nurse). Report included the following information SBAR, Kardex, ED Summary and MAR.

## 2020-12-06 NOTE — PROGRESS NOTES
End of Shift Note    Bedside shift change report given to Carito (oncoming nurse) by Kiara Pickens (offgoing nurse). Report included the following information SBAR, Kardex and MAR    Shift worked:  7p-7a     Shift summary and any significant changes:     Patient did not complain of any pain during my shift. Patient has been pleasantly confused periodically throughout my shift. Patient had to be re-oriented many times. IV and Midline catheter has been flushed and are patent. Patient is up with the assistance of one but at times, she does not use her call bell. Patient teaching and routine rounding has been done. All scheduled medications have been administered. Concerns for physician to address:       Zone phone for oncoming shift:          Activity:  Activity Level: Bath Room Privileges, Up with Assistance  Number times ambulated in hallways past shift: 0  Number of times OOB to chair past shift: 1    Cardiac:   Cardiac Monitoring: Yes      Cardiac Rhythm: Normal sinus rhythm    Access:   Current line(s): PIV and midline     Genitourinary:   Urinary status: incontinent    Respiratory:   O2 Device: Room air  Chronic home O2 use?: NO  Incentive spirometer at bedside: NO     GI:  Last Bowel Movement Date: 12/02/20  Current diet:  DIET DIABETIC CONSISTENT CARB Regular  Passing flatus: YES  Tolerating current diet: YES  % Diet Eaten: 90 %    Pain Management:   Patient states pain is manageable on current regimen: YES    Skin:  Madhu Score: 19  Interventions: turn team    Patient Safety:  Fall Score:  Total Score: 4  Interventions: bed/chair alarm, gripper socks and pt to call before getting OOB  High Fall Risk: Yes    Length of Stay:  Expected LOS: 3d 19h  Actual LOS: [facility]

## 2020-12-06 NOTE — DISCHARGE INSTRUCTIONS
General Discharge Instructions    Patient ID:  Rosetta Croft  [id number]  67 y.o.  1940    Patient Instructions          The following personal items were collected during your admission and were returned to you. Take Home Medications             What to do at Home    Recommended diet: Diabetic Diet with bedtime snack    Recommended activity: Activity as tolerated    Follow-up with Oneyda Beverly MD  in {0-10:69634} {units:11}. Information obtained by :  I understand that if any problems occur once I am at home I am to contact my physician. I understand and acknowledge receipt of the instructions indicated above.                                                                                                                                            Physician's or R.N.'s Signature                                                                  Date/Time                                                                                                                                              Patient or Representative Signature                                                          Date/Time

## 2020-12-06 NOTE — PROGRESS NOTES
ALEKSANDAR Plan  · Home w/ daughter and personal care aides . Patient has been approved for Medicaid care aides from [facility] scheduled to start today 11/30.    · -Resumption of PT, OT, SN with [facility]. · Family has been working on trying to obtain hospital bed, [facility] OT was scheduled to come out 11/27 to assess for need for hospital bed  · Follow up appointments (PCP)   · 2nd IM Letter    CM acknowledges discharge order. CM spoke with attending via telephone re: SPENSER orders. Verbal consent obtained to place SPENSER orders for home health. CM spoke with pt's daughter Radha Cleary [phone]) re: discharge plan. CM introduced self, explained role. Daughter verbalized understanding. CM informed daughter of discharge. Daughter is aware and is in agreement. CM asked daughter if she would like SPENSER orders for [facility] for continued care. Daughter in agreement and reports the pt will be going to her home address ([address] [place] [address] [place] [address]). Daughter reports she will transport patient. CM reviewed 2nd IM letter information with daughter. Daughter verbalized understanding and is in agreement with discharge. CM sent SPENSER orders to [facility] for review. No further questions or needs identified at this time. CM will continue to remain available for support, discharge planning as needed.      Buzz Quresih, MSW, LSW  Supervisee in Social Work  Kamran Conrad  [phone]

## 2020-12-07 ENCOUNTER — PATIENT OUTREACH (OUTPATIENT)
Dept: CASE MANAGEMENT | Age: 80
End: 2020-12-07

## 2020-12-07 NOTE — DISCHARGE SUMMARY
[address] SUMMARY    Name:  Ventura Heimlich  MR#:  [id number]  :  1940  ACCOUNT #:  [de-identified]  ADMIT DATE:  2020  DISCHARGE DATE:  2020    HISTORY OF PRESENT ILLNESS:  The patient is a 23-year-old lady who presented to emergency room because of fatigue, profound weakness and hyperglycemia. She was evaluated and found to be in diabetic ketoacidosis. In view of this, the patient was admitted. She has had recurrent admissions exclusively related to her poor home setting and lack of diabetic compliance because of this. There appears to be no inciting event. Past medical history, social history, review of systems, family history, physical examination were as in admitting H and P.    LAB VALUES:  Initial abnormalities on the comprehensive revealed a potassium of 6.4, CO2 less than 5, blood sugar 695, BUN and creatinine 34 and 1.50. On , BUN and creatinine were 12 and 0.51 respectively with potassium of 3.6 and CO2 of 29. Hemoglobin was 11.0, white count 13.3 on admission with MCV of 105.2, platelet count of 956 and the differential revealing 82 segs, 11 lymphocytes, 4 monocytes, 1 basophil, 1 metamyelocyte and 1 myelocyte. On , hemoglobin was 9.5, white count of 5.8. Urinalysis revealed 5-10 WBCs per high power field. RADIOGRAPHS:  Chest x-ray negative. HOSPITAL COURSE:  The patient was admitted and placed in intensive care unit, where Glucommander orders were written for resolution of her diabetic ketoacidosis. As is usually the case with in a 24 hour period of time, ketoacidosis resolved and blood sugars improved. She was subsequently transitioned off of the insulin drip and with resumption of her basal and prandial insulin. She was then transferred to the floor.   Overall diabetic control was somewhat slowed down, because of the fact that her basal insulin have to be given in the morning rather than at bedtime, because she has a high probability of developing hypoglycemia. 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 Interestingly, she has not been hospitalized or come to the emergency room for interventional hypoglycemia. The other problem is she does not come to the office on a consistent enough basis in order to make appropriate adjustments to reduce the hyperglycemia. FINAL DIAGNOSES:  1. Diabetic ketoacidosis. 2.  Medical noncompliance. 3.  Progressive dementia. 4.  Profound dehydration secondary to uncontrolled diabetes. 5.  Primary hypertension. 6.  Dyslipidemia. 7.  Compensated hypothyroidism. DISPOSITION:  1. The patient will be discharged home ambulatory on an ADA diet with a bedtime snack. Unfortunately, compliance with this was extremely poor. 2.  Emphasis was again placed on eating at the same time everyday, which also was not consistently followed. 3.  Discharge medications include the following:  Amlodipine 10 mg daily, Alphagan 0.15% one drop both eyes b.i.d., Tresiba 16 units q.a.m., losartan/HCTZ 100/25 q.a.m., metoprolol succinate 25 mg daily, pravastatin 80 mg at bedtime, zinc oxide 20% ointment p.r.n., clopidogrel 75 mg daily, Humalog 3 units before breakfast and 4 units before lunch and 3 units before dinner, Levoxyl 0.175 mg daily. 4.  The patient return to the office in the next 3 days. 5.  I again reminded the family the importance of eating at the same time everyday and checking blood sugars on a consistent basis, so adjustments can be made accordingly. 6.  Unfortunately, I do not anticipate a major change.         MD KENNETH [email or web address]  D:  12/06/2020 11:05  T:  12/07/2020 0:58  JOB #:  [id number]

## 2020-12-15 ENCOUNTER — PATIENT OUTREACH (OUTPATIENT)
Dept: CASE MANAGEMENT | Age: 80
End: 2020-12-15

## 2020-12-15 NOTE — PROGRESS NOTES
Called and spoke to daughter, Araceli Mckeon. Goals      Patient verbalizes understanding of self -management goals of living with Diabetes. 11/13/20 [facility] 10/31-11/12 DKA  (multiple adm this year for DKA)   Patient went to daughter's house after discharge.  Reviewed discharge instructions with daughter.  Reviewed meds with daughter-    Reviewed red flags: fever,nausea,vomiting,diarrhea,confusion,weakness,sob, chest discomfort.  Reminded daughter to check blood sugar qid and report if > 300 to pcp.  Reviewed diabetic diet with daughter- plate method. 1/2 plate with non starchy veggies, 1/4 plate with lean protein, and 1/4 plate with healthy fats/grains.  Given info on Dallas Quiñonez as resource.  Given CTN contact info if any questions/concerns.  Advised CTN to check back in about a week, sooner prn.mbt    12/8/20 [facility] 11/30-12/6 DKA  Reviewed discharge instructions and meds with daughter. Discussed importance of HS snacks, regular blood sugar checks -record and take to pcp office. Also importance of regular f/u with pcp- needs ALEKSANDAR this week following this hospitalization- offered to schedule for her but daughter says she will call. Advised to notify CTN if any difficulty scheduling this appt. [facility] resumed care yesterday. Daughter spoke to [facility] today re aide for mother- they will arrange now that mother back at home. Advised daughter to call if questions/concerns. mbt    12/15/20  Spoke to daughter. Says mother is doing good. Lots of energy, can't keep still. Good appetite, following DM diet. No weakness or AMS noted. Blood sugar much better. Often around 120 in am. Did have one night when went up to 300, next morning was 65. But for most part, in 100's. Has not seen Axedae Loges, daughter says she has been so busy. Promises to make appt for asap- advised to contact CTN if any difficulty scheduling this appt.  She agreed to do so.mbt

## 2020-12-23 ENCOUNTER — PATIENT OUTREACH (OUTPATIENT)
Dept: CASE MANAGEMENT | Age: 80
End: 2020-12-23

## 2020-12-23 NOTE — Clinical Note
Hilaria Youngblood,  I attempted to reach patient's daughter, but she did not call me back.     Shane Fang

## 2020-12-23 NOTE — PROGRESS NOTES
12/23/2020  Called Honey RICHARD to follow up regarding patient  Savage Mendosa reports patient resides with her  Requested return call later today    12/24/2020  Returned call to Mimi Rivas

## 2020-12-24 RX ORDER — LOSARTAN POTASSIUM AND HYDROCHLOROTHIAZIDE 25; 100 MG/1; MG/1
TABLET ORAL
Qty: 90 TAB | Refills: 3 | Status: SHIPPED | OUTPATIENT
Start: 2020-12-24 | End: 2021-04-21 | Stop reason: SDUPTHER

## 2020-12-29 ENCOUNTER — PATIENT OUTREACH (OUTPATIENT)
Dept: CASE MANAGEMENT | Age: 80
End: 2020-12-29

## 2020-12-30 NOTE — PROGRESS NOTES
Called 12/29 and 12/30 and LM for daughter, Genet Rosario, to call me back. Will continue to try and reach daughter.

## 2021-01-04 RX ORDER — AMLODIPINE BESYLATE 10 MG/1
10 TABLET ORAL DAILY
Qty: 30 TAB | Refills: 11 | Status: SHIPPED | OUTPATIENT
Start: 2021-01-04 | End: 2021-01-28 | Stop reason: SDUPTHER

## 2021-01-08 ENCOUNTER — PATIENT OUTREACH (OUTPATIENT)
Dept: CASE MANAGEMENT | Age: 81
End: 2021-01-08

## 2021-01-08 NOTE — PROGRESS NOTES
Patient has graduated from the Transitions of Care Coordination  program on 1/8/21. Patient/family has the ability to self-manage at this time Care management goals have been completed. Patient was not referred to the [facility] team for further management. Daughter,Nidhi, is primary caregiver and feels she can manage at this time without ACM. Advised can start if changes her mind. Goals Addressed                 This Visit's Progress     COMPLETED: Patient verbalizes understanding of self -management goals of living with Diabetes. 11/13/20 [id number] 10/31-11/12 DKA  (multiple adm this year for DKA)   Patient went to daughter's house after discharge.  Reviewed discharge instructions with daughter.  Reviewed meds with daughter-    Reviewed red flags: fever,nausea,vomiting,diarrhea,confusion,weakness,sob, chest discomfort.  Reminded daughter to check blood sugar qid and report if > 300 to pcp.  Reviewed diabetic diet with daughter- plate method. 1/2 plate with non starchy veggies, 1/4 plate with lean protein, and 1/4 plate with healthy fats/grains.  Given info on [facility] Quiñonez as resource.  Given CTN contact info if any questions/concerns.  Advised CTN to check back in about a week, sooner prn.mbt    12/8/20 [address] 11/30-12/6 DKA  Reviewed discharge instructions and meds with daughter. Discussed importance of HS snacks, regular blood sugar checks -record and take to pcp office. Also importance of regular f/u with pcp- needs ALEKSANDAR this week following this hospitalization- offered to schedule for her but daughter says she will call. Advised to notify CTN if any difficulty scheduling this appt. [facility] resumed care yesterday. Daughter spoke to [facility] today re aide for mother- they will arrange now that mother back at home. Advised daughter to call if questions/concerns. mbt    12/15/20  Spoke to daughter. Says mother is doing good. Lots of energy, can't keep still.   Good appetite, following DM diet. No weakness or AMS noted. Blood sugar much better. Often around 120 in am. Did have one night when went up to 300, next morning was 65. But for most part, in 100's. Has not seen Marvin Costa, daughter says she has been so busy. Promises to make appt for asap- advised to contact CTN if any difficulty scheduling this appt. She agreed to do so.mbt  1/8/21  Daughter reports mother is doing well. Blood sugars very good. Has a caregiver during the week so she can work. Only problem is at night, mother becomes combative. 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 She had requested med to help from pcp, Marvin Costa. On way to pick it up now. Advised to let him know if effective, can always tweak the dose. .mbt              Patient has Care Transition Nurse's contact information for any further questions, concerns, or needs.   Patients upcoming visits:    Future Appointments   Date Time Provider Tawnya Evans   1/13/2021  1:30 PM Diego Hough MD [facility] MAIN BS AMB

## 2021-01-13 ENCOUNTER — OFFICE VISIT (OUTPATIENT)
Dept: INTERNAL MEDICINE CLINIC | Age: 81
End: 2021-01-13
Payer: MEDICARE

## 2021-01-13 VITALS
HEIGHT: 63 IN | OXYGEN SATURATION: 98 % | DIASTOLIC BLOOD PRESSURE: 69 MMHG | TEMPERATURE: 98.6 F | RESPIRATION RATE: 16 BRPM | HEART RATE: 66 BPM | SYSTOLIC BLOOD PRESSURE: 142 MMHG | BODY MASS INDEX: 25.34 KG/M2 | WEIGHT: 143 LBS

## 2021-01-13 DIAGNOSIS — E03.2 HYPOTHYROIDISM DUE TO MEDICATION: ICD-10-CM

## 2021-01-13 DIAGNOSIS — E10.10 DIABETIC KETOACIDOSIS WITHOUT COMA ASSOCIATED WITH TYPE 1 DIABETES MELLITUS (HCC): ICD-10-CM

## 2021-01-13 DIAGNOSIS — E10.649 HYPOGLYCEMIA UNAWARENESS IN TYPE 1 DIABETES MELLITUS (HCC): ICD-10-CM

## 2021-01-13 DIAGNOSIS — E78.5 DYSLIPIDEMIA: ICD-10-CM

## 2021-01-13 DIAGNOSIS — E10.65 HYPERGLYCEMIA DUE TO TYPE 1 DIABETES MELLITUS (HCC): ICD-10-CM

## 2021-01-13 DIAGNOSIS — F01.50 VASCULAR DEMENTIA WITHOUT BEHAVIORAL DISTURBANCE (HCC): Chronic | ICD-10-CM

## 2021-01-13 DIAGNOSIS — E11.649 UNCONTROLLED TYPE 2 DIABETES MELLITUS WITH HYPOGLYCEMIA WITHOUT COMA, WITHOUT LONG-TERM CURRENT USE OF INSULIN (HCC): Primary | ICD-10-CM

## 2021-01-13 LAB — GLUCOSE POC: 214 MG/DL

## 2021-01-13 PROCEDURE — G8510 SCR DEP NEG, NO PLAN REQD: HCPCS | Performed by: INTERNAL MEDICINE

## 2021-01-13 PROCEDURE — G8399 PT W/DXA RESULTS DOCUMENT: HCPCS | Performed by: INTERNAL MEDICINE

## 2021-01-13 PROCEDURE — 99214 OFFICE O/P EST MOD 30 MIN: CPT | Performed by: INTERNAL MEDICINE

## 2021-01-13 PROCEDURE — G8427 DOCREV CUR MEDS BY ELIG CLIN: HCPCS | Performed by: INTERNAL MEDICINE

## 2021-01-13 PROCEDURE — 82962 GLUCOSE BLOOD TEST: CPT | Performed by: INTERNAL MEDICINE

## 2021-01-13 PROCEDURE — 1090F PRES/ABSN URINE INCON ASSESS: CPT | Performed by: INTERNAL MEDICINE

## 2021-01-13 PROCEDURE — G8753 SYS BP > OR = 140: HCPCS | Performed by: INTERNAL MEDICINE

## 2021-01-13 PROCEDURE — G8536 NO DOC ELDER MAL SCRN: HCPCS | Performed by: INTERNAL MEDICINE

## 2021-01-13 PROCEDURE — G8419 CALC BMI OUT NRM PARAM NOF/U: HCPCS | Performed by: INTERNAL MEDICINE

## 2021-01-13 PROCEDURE — G8754 DIAS BP LESS 90: HCPCS | Performed by: INTERNAL MEDICINE

## 2021-01-13 PROCEDURE — 1101F PT FALLS ASSESS-DOCD LE1/YR: CPT | Performed by: INTERNAL MEDICINE

## 2021-01-13 NOTE — PROGRESS NOTES
Results for orders placed or performed in visit on 01/13/21   AMB POC GLUCOSE BLOOD, BY GLUCOSE MONITORING DEVICE   Result Value Ref Range    Glucose  mg/dL

## 2021-01-13 NOTE — PROGRESS NOTES
Chief Complaint   Patient presents with    Follow-up     Patient is here for a follow up. 1. Have you been to the ER, urgent care clinic since your last visit? Hospitalized since your last visit? Yes Reason for visit: High blood sugar     2. Have you seen or consulted any other health care providers outside of the [address] [place] [address] since your last visit? Include any pap smears or colon screening.  No

## 2021-01-13 NOTE — PROGRESS NOTES
[address] [facility]  [place] [address]  [address] [place] [address]  Phone:  [phone]  Fax: [phone]       Chief Complaint   Patient presents with    Follow-up     Patient is here for a follow up. .      SUBJECTIVE:    Libby Hopkins is a 79 y.o. female Comes in for return visit stating that she has done reasonably well. Her daughter accompanies her today and answers most questions. She, however, could not give me any blood sugars. Apparently someone comes by to assist with her overall diabetic care, but she is not available at the present time. The patient has recurrent episodes of diabetic ketoacidosis, but interestingly has no emergency room visits for hypoglycemia. Generally when she gets home her blood sugars begin to rise over the ensuing weeks, to the point where she develops DKA and is generally admitted. Her medical history is complicated by her vascular dementia, which is gradually getting worse. She has a past history of primary hypertension, dyslipidemia and hypothyroidism. Current Outpatient Medications   Medication Sig Dispense Refill    amLODIPine (NORVASC) 10 mg tablet Take 1 Tab by mouth daily. 30 Tab 11    losartan-hydroCHLOROthiazide (HYZAAR) 100-25 mg per tablet TAKE 1 TABLET BY MOUTH EVERY DAY 90 Tab 3    insulin degludec Prema Rivera FlexTouch U-100) 100 unit/mL (3 mL) inpn 16 Units by SubCUTAneous route daily. 2 Adjustable Dose Pre-filled Pen Syringe 11    insulin lispro (HUMALOG) 100 unit/mL kwikpen 3 units before meals breakfast ,4 units before lunch and 3 units before dinner 2 Adjustable Dose Pre-filled Pen Syringe 11    zinc oxide 20 % ointment Apply 1 Applicator to affected area two (2) times a day.  thin layer gluteal cleft      metoprolol succinate (TOPROL-XL) 25 mg XL tablet TAKE 1 TABLET BY MOUTH EVERY DAY 90 Tab 3    pravastatin (PRAVACHOL) 80 mg tablet TAKE 1 TABLET BY MOUTH at bedtime 90 Tab 3    levothyroxine (SYNTHROID) 175 mcg tablet TAKE 1 TABLET BY MOUTH EVERY DAY 90 Tab 3    clopidogreL (PLAVIX) 75 mg tab TAKE 1 TABLET BY MOUTH EVERY DAY 90 Tab 3    brimonidine (ALPHAGAN) 0.15 % ophthalmic solution Administer 1 Drop to both eyes two (2) times a day.        Past Medical History:   Diagnosis Date    Diabetes [facility]     Heart failure ([facility]     unknown to family    Hypertension     Stroke [facility])      Past Surgical History:   Procedure Laterality Date    HX GYN      HX HEENT      thyroidectomy    HX HYSTERECTOMY      NH REMOVAL GALLBLADDER      NH THYROIDECTOMY       No Known Allergies      REVIEW OF SYSTEMS:  General: negative for - chills or fever  ENT: negative for - headaches, nasal congestion or tinnitus  Respiratory: negative for - cough, hemoptysis, shortness of breath or wheezing  Cardiovascular : negative for - chest pain, edema, palpitations or shortness of breath  Gastrointestinal: negative for - abdominal pain, blood in stools, heartburn or nausea/vomiting  Genito-Urinary: no dysuria, trouble voiding, or hematuria  Musculoskeletal: negative for - gait disturbance, joint pain, joint stiffness or joint swelling  Neurological: no TIA or stroke symptoms  Hematologic: no bruises, no bleeding, no swollen glands  Integument: no lumps, mole changes, nail changes or rash  Endocrine: no malaise/lethargy or unexpected weight changes      Social History     Socioeconomic History    Marital status:      Spouse name: Not on file    Number of children: 1    Years of education: Not on file    Highest education level: Not on file   Occupational History    Occupation: retired   Tobacco Use    Smoking status: Never Smoker    Smokeless tobacco: Never Used   Substance and Sexual Activity    Alcohol use: No     Comment: been years    Drug use: No    Sexual activity: Not Currently     Family History   Problem Relation Age of Onset    Diabetes Father     No Known Problems Mother        OBJECTIVE:    Visit Vitals  BP Catherine Thomson ) 142/69   Pulse 66   Temp 98.6 °F (37 °C)   Resp 16   Ht 5' 3\" (1.6 m)   Wt 143 lb (64.9 kg)   SpO2 98%   BMI 25.33 kg/m²     CONSTITUTIONAL: well , well nourished, appears age appropriate  EYES: perrla, eom intact  ENMT:moist mucous membranes, pharynx clear  NECK: supple. Thyroid normal  RESPIRATORY: Chest: clear to ascultation and percussion   CARDIOVASCULAR: Heart: regular rate and rhythm  GASTROINTESTINAL: Abdomen: soft, bowel sounds active  HEMATOLOGIC: no pathological lymph nodes palpated  MUSCULOSKELETAL: Extremities: no edema, pulse 1+   INTEGUMENT: No unusual rashes or suspicious skin lesions noted. Nails appear normal.  NEUROLOGIC: non-focal exam   MENTAL STATUS: alert and oriented, appropriate affect      ASSESSMENT:  1. Uncontrolled type 2 diabetes mellitus with hypoglycemia without coma, without long-term current use of insulin ([address]    2. Hyperglycemia due to type 1 diabetes mellitus ([address]    3. 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 Diabetic ketoacidosis without coma associated with type 1 diabetes mellitus ([address]    5. Vascular dementia without behavioral disturbance ([address]    6. Dyslipidemia    7. Hypothyroidism due to medication        PLAN:    1. Her diabetes appears to be reasonable today based on a single blood sugar of 214, which is about 3-4 hours postprandial.  She is currently taking her 16 units of Tresiba along with her 4-3-3 units of prandial insulin. Unfortunately, her diabetic control is complicated by hypoglycemic unawareness. I asked the family to call me weekly with her blood sugars because it appears that when she stays home for an extended period of time, her blood sugars to rise, culminating in DKA. 2. She also has progressive vascular dementia. 3. Her blood pressure is excellent today, no adjustments are made. 4. She will continue statin in view of her increased cardiovascular risk.     .  Orders Placed This Encounter    AMB POC GLUCOSE BLOOD, BY GLUCOSE MONITORING DEVICE         Follow-up and Dispositions    · Return in about 4 weeks (around 2/10/2021).            Zenon Roberson MD

## 2021-01-18 RX ORDER — PEN NEEDLE, DIABETIC 31 GX3/16"
NEEDLE, DISPOSABLE MISCELLANEOUS
Qty: 200 PEN NEEDLE | Refills: 11 | Status: SHIPPED | OUTPATIENT
Start: 2021-01-18 | End: 2021-05-27 | Stop reason: SDUPTHER

## 2021-01-19 NOTE — DISCHARGE SUMMARY
[address] SUMMARY    Name:  Cali Qureshi  MR#:  [id number]  :  ACCOUNT #:  [de-identified]  ADMIT DATE:  10/31/2020  DISCHARGE DATE:  2020    HISTORY OF PRESENT ILLNESS:  The patient is a 28-year-old debilitated lady, presented to the emergency room because of DKA. Her blood sugar in the emergency room was 1200 with significant acidosis. She was started on DKA protocol, but her pressure was quite low, more so, than usual.  In spite of a bolusing dose of insulin, she required a pressor for blood pressure maintenance. In view of this, the patient was admitted. Past medical history, social history, review of systems, family history, physical examination are as in the admitting H and P.    LAB VALUES:  Abnormalities in the comprehensive profile were limited to a CO2 of less than 5, with a BUN and creatinine of 34 and 1.87 along with a lactic acid of 6.7. Lactic acid subsequently normalized on . Within 24 hours, the BUN and creatinine were 19 and 0.93 with a CO2 of 26. Hemoglobin of 9.6, white count 18.2 with MCV of 93.8, platelet count 849 with the following differential, 88 segs, 5 lymphocytes, 3 monocytes and 4 immature granulocytes. Chest x-ray normal.    CONSULTATIONS:  One consultation obtained with Pulmonary, who assisted with care. Second consultation was with Dr. Malaika Zamora of Infectious Disease and the third consultation was with Dr. Rohit Browne of Cardiology. All the comments would be elaborated on the hospital course. HOSPITAL COURSE:  The patient was initially placed in the intensive care unit where within 24 hours, her DKA was broken. She, however, remained on a pressor because she was hypotensive. She was getting fluids simultaneously. Fortunately, blood cultures were done on the day of admission and 2 of the cultures were positive for Gram-positive cocci.   She remained on broad spectrum antibiotics until seen by Infectious Disease,  Joshua. She suggested switching her to vancomycin only and discontinue the Zosyn. Levophed was subsequently decreased to the point where pressure was able to be maintained. She then underwent a complete workup to make sure there was no seeding which included a LOULOU by Dr. Reji Romo. This was, indeed, negative. She had 2 colony types of Staph suggesting the possibility of a contaminant, but in view of her debilitated status, antibiotics were indeed continued. She suggested continuing the vancomycin for 7 days. This was done and on the seventh day, antibiotics were discontinued with no recurrence of her temperature elevation or white count or leukocytosis. She did develop a self-limiting nonsustained VTach, which was not felt to be of clinical significance, correction of the electrolytes led to no further rhythm disturbance. Her hospital stay was prolonged slightly by a slight increase in her blood sugar at the anticipated time of discharge, but this subsequently improved and she was then discharged asymptomatically. FINAL DIAGNOSES:  1. Diabetic ketoacidosis. 2.  Possible Gram-positive sepsis. 3.  Septic shock. 4.  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 5.  History of hypertension. 6.  Dyslipidemia. 7.  Hypothyroidism, compensated. DISPOSITION:  1. The patient will be discharged home ambulatory on an ADA diet. 2.  Discharge medications include the following:  Degludec 16 units q.a.m., Humalog 4 units a.c., amlodipine 10 mg daily, losartan/HCTZ 100/25 q.a.m., metoprolol succinate 25 mg daily, pravastatin 80 mg at bedtime, Levoxyl 0.175 mg daily, clopidogrel 75 mg daily, Alphagan 0.15% one drop both eyes b.i.d.  3.  The patient remains a full code. 4.  She will return to the office in the next 3 days.       Ant Martinez MD      [id number]  D:  01/18/2021 21:24  T:  01/19/2021 5:48  JOB #:  [id number]

## 2021-01-26 DIAGNOSIS — I10 ESSENTIAL HYPERTENSION: ICD-10-CM

## 2021-01-26 DIAGNOSIS — E78.5 DYSLIPIDEMIA: ICD-10-CM

## 2021-01-26 RX ORDER — PRAVASTATIN SODIUM 80 MG/1
TABLET ORAL
Qty: 90 TAB | Refills: 3 | Status: SHIPPED | OUTPATIENT
Start: 2021-01-26 | End: 2022-03-01

## 2021-01-26 RX ORDER — CLOPIDOGREL BISULFATE 75 MG/1
TABLET ORAL
Qty: 90 TAB | Refills: 3 | Status: SHIPPED | OUTPATIENT
Start: 2021-01-26 | End: 2022-03-18 | Stop reason: SDUPTHER

## 2021-01-28 RX ORDER — AMLODIPINE BESYLATE 10 MG/1
10 TABLET ORAL DAILY
Qty: 30 TAB | Refills: 11 | Status: SHIPPED | OUTPATIENT
Start: 2021-01-28 | End: 2022-03-02

## 2021-02-01 RX ORDER — BENZTROPINE MESYLATE 1 MG/1
1 TABLET ORAL 2 TIMES DAILY
Qty: 60 TAB | Refills: 11 | Status: SHIPPED | OUTPATIENT
Start: 2021-02-01 | End: 2021-11-08

## 2021-02-01 RX ORDER — HALOPERIDOL 2 MG/1
TABLET ORAL
Qty: 120 TAB | Refills: 5 | Status: SHIPPED | OUTPATIENT
Start: 2021-02-01

## 2021-02-01 NOTE — TELEPHONE ENCOUNTER
Patient daughter states patient out of control fighting walking all night and they have to lock their doors.  Per Dr. Wenda Alpers patient to start cogentin 1mg bid and haldol 2mg 2 tabs twice a day

## 2021-02-02 NOTE — PROGRESS NOTES
See results.   Spiritual Care Assessment/Progress Note  [facility]      NAME: Miguel Lord      MRN: [id number]  AGE: 67 y.o. SEX: female  Jew Affiliation: Shinto   Language: English     11/7/2020     Total Time (in minutes): 5     Spiritual Assessment begun in [facility] through conversation with:         []Patient        [] Family    [] Friend(s)        Reason for Consult: Initial/Spiritual assessment, patient floor     Spiritual beliefs: (Please include comment if needed)     [] Identifies with a erlinda tradition:         [] Supported by a erlinda community:            [] Claims no spiritual orientation:           [] Seeking spiritual identity:                [] Adheres to an individual form of spirituality:           [x] Not able to assess:                           Identified resources for coping:      [] Prayer                               [] Music                  [] Guided Imagery     [] Family/friends                 [] Pet visits     [] Devotional reading                         [x] Unknown     [] Other:                                         Interventions offered during this visit: (See comments for more details)    Patient Interventions: Other (comment)(Pt not available)           Plan of Care:     [] Support spiritual and/or cultural needs    [] Support AMD and/or advance care planning process      [] Support grieving process   [] Coordinate Rites and/or Rituals    [] Coordination with community clergy   [] No spiritual needs identified at this time   [] Detailed Plan of Care below (See Comments)  [] Make referral to Music Therapy  [] Make referral to Pet Therapy     [] Make referral to Addiction services  [] Make referral to [facility]  [] Make referral to Spiritual Care Partner  [] No future visits requested        [x] Follow up visits as needed     Comments: Attempted to provide initial spiritual assessment for pt Salvatore on NEURO TELE.  Patient was asleep and not available for visit. No family was present. As such, unable to assess needs/supports at this time. Chaplains will follow as able and/or needed.     Link 1 MORELIA Malcolm 1 Provider   Paging Service [phone])

## 2021-02-15 ENCOUNTER — OFFICE VISIT (OUTPATIENT)
Dept: INTERNAL MEDICINE CLINIC | Age: 81
End: 2021-02-15
Payer: MEDICARE

## 2021-02-15 VITALS
HEIGHT: 63 IN | OXYGEN SATURATION: 98 % | SYSTOLIC BLOOD PRESSURE: 113 MMHG | TEMPERATURE: 98.3 F | BODY MASS INDEX: 23.3 KG/M2 | HEART RATE: 71 BPM | DIASTOLIC BLOOD PRESSURE: 70 MMHG | RESPIRATION RATE: 16 BRPM | WEIGHT: 131.5 LBS

## 2021-02-15 DIAGNOSIS — E10.649 HYPOGLYCEMIA UNAWARENESS IN TYPE 1 DIABETES MELLITUS (HCC): ICD-10-CM

## 2021-02-15 DIAGNOSIS — Z13.31 SCREENING FOR DEPRESSION: ICD-10-CM

## 2021-02-15 DIAGNOSIS — F01.50 VASCULAR DEMENTIA WITHOUT BEHAVIORAL DISTURBANCE (HCC): Chronic | ICD-10-CM

## 2021-02-15 DIAGNOSIS — R79.89 PRERENAL AZOTEMIA: ICD-10-CM

## 2021-02-15 DIAGNOSIS — Z00.00 WELLNESS EXAMINATION: ICD-10-CM

## 2021-02-15 DIAGNOSIS — E10.65 HYPERGLYCEMIA DUE TO TYPE 1 DIABETES MELLITUS (HCC): Primary | ICD-10-CM

## 2021-02-15 DIAGNOSIS — I10 ESSENTIAL HYPERTENSION: ICD-10-CM

## 2021-02-15 DIAGNOSIS — E78.5 DYSLIPIDEMIA: ICD-10-CM

## 2021-02-15 PROCEDURE — G8752 SYS BP LESS 140: HCPCS | Performed by: INTERNAL MEDICINE

## 2021-02-15 PROCEDURE — 1101F PT FALLS ASSESS-DOCD LE1/YR: CPT | Performed by: INTERNAL MEDICINE

## 2021-02-15 PROCEDURE — G8432 DEP SCR NOT DOC, RNG: HCPCS | Performed by: INTERNAL MEDICINE

## 2021-02-15 PROCEDURE — G8399 PT W/DXA RESULTS DOCUMENT: HCPCS | Performed by: INTERNAL MEDICINE

## 2021-02-15 PROCEDURE — G8754 DIAS BP LESS 90: HCPCS | Performed by: INTERNAL MEDICINE

## 2021-02-15 PROCEDURE — 1090F PRES/ABSN URINE INCON ASSESS: CPT | Performed by: INTERNAL MEDICINE

## 2021-02-15 PROCEDURE — G0439 PPPS, SUBSEQ VISIT: HCPCS | Performed by: INTERNAL MEDICINE

## 2021-02-15 PROCEDURE — 99215 OFFICE O/P EST HI 40 MIN: CPT | Performed by: INTERNAL MEDICINE

## 2021-02-15 PROCEDURE — G8427 DOCREV CUR MEDS BY ELIG CLIN: HCPCS | Performed by: INTERNAL MEDICINE

## 2021-02-15 PROCEDURE — G8420 CALC BMI NORM PARAMETERS: HCPCS | Performed by: INTERNAL MEDICINE

## 2021-02-15 PROCEDURE — G8536 NO DOC ELDER MAL SCRN: HCPCS | Performed by: INTERNAL MEDICINE

## 2021-02-15 NOTE — PROGRESS NOTES
Chief Complaint   Patient presents with   [facility] Annual Wellness Visit           1. Have you been to the ER, urgent care clinic since your last visit? Hospitalized since your last visit? No    2. Have you seen or consulted any other health care providers outside of the [address] [place] [address] since your last visit? Include any pap smears or colon screening.  No

## 2021-02-16 LAB
ANION GAP SERPL CALC-SCNC: 4 MMOL/L (ref 5–15)
BUN SERPL-MCNC: 22 MG/DL (ref 6–20)
BUN/CREAT SERPL: 26 (ref 12–20)
CALCIUM SERPL-MCNC: 8.9 MG/DL (ref 8.5–10.1)
CHLORIDE SERPL-SCNC: 102 MMOL/L (ref 97–108)
CO2 SERPL-SCNC: 29 MMOL/L (ref 21–32)
CREAT SERPL-MCNC: 0.84 MG/DL (ref 0.55–1.02)
EST. AVERAGE GLUCOSE BLD GHB EST-MCNC: 286 MG/DL
GLUCOSE SERPL-MCNC: 233 MG/DL (ref 65–100)
HBA1C MFR BLD: 11.6 % (ref 4–5.6)
POTASSIUM SERPL-SCNC: 3.8 MMOL/L (ref 3.5–5.1)
SODIUM SERPL-SCNC: 135 MMOL/L (ref 136–145)

## 2021-02-21 NOTE — PROGRESS NOTES
[address] [facility]  [place] [address]  [address] [facility] [address]  Phone:  [phone]  Fax: [phone]       Chief Complaint   Patient presents with   [facility] Wellness Visit   . SUBJECTIVE:    Henrik Younger is a 79 y.o. female comes in for a return visit accompanied by one of her sons. She is now living with her daughter. As a direct result of this, she has not been hospitalized since then. This reinforces the fact that there was total lack of control in the previous home setting to the point of where insulin was frequently missed. In any event, she appears to be doing somewhat better currently. She answers questions appropriately, although she is a bit impulsive at this point. The son answers most questions. Her diabetes has been doing reasonably well according to him. She has a past history of primary hypertension, dyslipidemia, and hypothyroidism with what would be on her progressive dementia. Current Outpatient Medications   Medication Sig Dispense Refill    haloperidoL (HALDOL) 2 mg tablet Take 2 tablets twice a day 120 Tab 5    benztropine (COGENTIN) 1 mg tablet Take 1 Tab by mouth two (2) times a day. 60 Tab 11    amLODIPine (NORVASC) 10 mg tablet Take 1 Tab by mouth daily. 30 Tab 11    clopidogreL (PLAVIX) 75 mg tab TAKE 1 TABLET BY MOUTH EVERY DAY 90 Tab 3    pravastatin (PRAVACHOL) 80 mg tablet TAKE 1 TABLET BY MOUTH EVERY DAY 90 Tab 3    Insulin Needles, Disposable, (Zoey Pen Needle) 32 gauge x 5/32\" ndle Use to inject insulin four times a day. Dx.e11.9 200 Pen Needle 11    losartan-hydroCHLOROthiazide (HYZAAR) 100-25 mg per tablet TAKE 1 TABLET BY MOUTH EVERY DAY 90 Tab 3    insulin degludec Marciana Adis FlexTouch U-100) 100 unit/mL (3 mL) inpn 16 Units by SubCUTAneous route daily.  2 Adjustable Dose Pre-filled Pen Syringe 11    insulin lispro (HUMALOG) 100 unit/mL kwikpen 3 units before meals breakfast ,4 units before lunch and 3 units before dinner 2 Adjustable Dose Pre-filled Pen Syringe 11    zinc oxide 20 % ointment Apply 1 Applicator to affected area two (2) times a day. thin layer gluteal cleft      metoprolol succinate (TOPROL-XL) 25 mg XL tablet TAKE 1 TABLET BY MOUTH EVERY DAY 90 Tab 3    levothyroxine (SYNTHROID) 175 mcg tablet TAKE 1 TABLET BY MOUTH EVERY DAY 90 Tab 3    brimonidine (ALPHAGAN) 0.15 % ophthalmic solution Administer 1 Drop to both eyes two (2) times a day.        Past Medical History:   Diagnosis Date    Diabetes ([facility] [address]     Heart failure ([facility] [facility]     unknown to family    Hypertension     [facility])      Past Surgical History:   Procedure Laterality Date    HX GYN      HX HEENT      thyroidectomy    HX HYSTERECTOMY      [place] REMOVAL GALLBLADDER      KS THYROIDECTOMY       No Known Allergies      REVIEW OF SYSTEMS:  General: negative for - chills or fever  ENT: negative for - headaches, nasal congestion or tinnitus  Respiratory: negative for - cough, hemoptysis, shortness of breath or wheezing  Cardiovascular : negative for - chest pain, edema, palpitations or shortness of breath  Gastrointestinal: negative for - abdominal pain, blood in stools, heartburn or nausea/vomiting  Genito-Urinary: no dysuria, trouble voiding, or hematuria  Musculoskeletal: negative for - gait disturbance, joint pain, joint stiffness or joint swelling  Neurological: no TIA or stroke symptoms  Hematologic: no bruises, no bleeding, no swollen glands  Integument: no lumps, mole changes, nail changes or rash  Endocrine: no malaise/lethargy or unexpected weight changes      Social History     Socioeconomic History    Marital status:      Spouse name: Not on file    Number of children: 1    Years of education: Not on file    Highest education level: Not on file   Occupational History    Occupation: retired   Tobacco Use    Smoking status: Never Smoker    Smokeless tobacco: Never Used   Substance and Sexual Activity    Alcohol use: No     Comment: been years    Drug use: No    Sexual activity: Not Currently     Family History   Problem Relation Age of Onset    Diabetes Father     No Known Problems Mother        OBJECTIVE:    Visit Vitals  /70   Pulse 71   Temp 98.3 °F (36.8 °C) (Oral)   Resp 16   Ht 5' 3\" (1.6 m)   Wt 131 lb 8 oz (59.6 kg)   SpO2 98%   BMI 23.29 kg/m²     CONSTITUTIONAL: well , well nourished, appears age appropriate  EYES: perrla, eom intact  ENMT:moist mucous membranes, pharynx clear  NECK: supple. Thyroid normal  RESPIRATORY: Chest: clear to ascultation and percussion   CARDIOVASCULAR: Heart: regular rate and rhythm  GASTROINTESTINAL: Abdomen: soft, bowel sounds active  HEMATOLOGIC: no pathological lymph nodes palpated  MUSCULOSKELETAL: Extremities: no edema, pulse 1+   INTEGUMENT: No unusual rashes or suspicious skin lesions noted. Nails appear normal.  NEUROLOGIC: non-focal exam   MENTAL STATUS: alert and oriented, appropriate affect      ASSESSMENT:  1. Hyperglycemia due to type 1 diabetes mellitus ([address]    2. 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 Essential hypertension    4. Vascular dementia without behavioral disturbance ([address]    5. Dyslipidemia    6. Prerenal azotemia    7. Screening for depression    8. Wellness examination        PLAN:  1. The patient's diabetes hopefully is doing reasonably well. I will await the results of her hemoglobin A1c. The most important aspect is she has not been rehospitalized indicating that at least compliance with her medications exists to prevent severe hyperglycemia and ultimately ketoacidosis. 2. She does have hypoglycemic unawareness. Unfortunately, there is nothing more that can be done as relates to this. Hopefully, over time this might improve. 3. Blood pressure has been reasonable today and no adjustments are made. 4. Her vascular dementia is gradually getting worse.   Most recently, I put her on Haldol and Cogentin for behavioral problems and this has apparently made a difference. She is much more compliant at home apparently. 5. She remains on a statin in view of her increased cardiovascular risk. 6. She does have prerenal azotemia and I will continue to monitor this to ensure that this does not worsen. 7. I am quite pleased with the change in her living situation, which basically did allow no further hospitalizations for uncontrolled diabetes complicated by ketoacidosis. .  Orders Placed This Encounter   [address] 8-15 MIN    MICROALBUMIN, UR, RAND    METABOLIC PANEL, BASIC    HEMOGLOBIN A1C WITH EAG         Follow-up and Dispositions    · Return in about 4 weeks (around 3/15/2021). Joe Domingo MD  This is the Subsequent Medicare Annual Wellness Exam, performed 12 months or more after the Initial AWV or the last Subsequent AWV    I have reviewed the patient's medical history in detail and updated the computerized patient record. Depression Risk Factor Screening:     3 most recent PHQ Screens 1/13/2021   Little interest or pleasure in doing things Not at all   Feeling down, depressed, irritable, or hopeless Not at all   Total Score PHQ 2 0       Alcohol Risk Screen    Do you average more than 1 drink per night or more than 7 drinks a week:  No    On any one occasion in the past three months have you have had more than 3 drinks containing alcohol:  No        Functional Ability and Level of Safety:    Hearing: Hearing is good. Activities of Daily Living: The home contains: no safety equipment. Patient does total self care      Ambulation: with no difficulty     Fall Risk:  Fall Risk Assessment, last 12 mths 1/13/2021   Able to walk? Yes   Fall in past 12 months? 0   Do you feel unsteady?  0   Are you worried about falling 0      Abuse Screen:  Patient is not abused       Cognitive Screening    Has your family/caregiver stated any concerns about your memory: no     Cognitive Screening: Not applicable    Assessment/Plan   Education and counseling provided:  Are appropriate based on today's review and evaluation    Diagnoses and all orders for this visit:    1. Hyperglycemia due to type 1 diabetes mellitus (HCC)  -     MICROALBUMIN, UR, RAND W/ MICROALB/CREAT RATIO; Future  -     HEMOGLOBIN A1C WITH EAG; Future    2. 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 Essential hypertension  -     METABOLIC PANEL, BASIC; Future    4. Vascular dementia without behavioral disturbance ([address]    5. Dyslipidemia    6. Prerenal azotemia    7. Screening for depression  -     DEPRESSION SCREEN ANNUAL    8.  Wellness examination        Health Maintenance Due     Health Maintenance Due   Topic Date Due    COVID-19 Vaccine (1 of 2) [birth year]/31/1957    Shingrix Vaccine Age 50> (1 of 2) 12/31/1991    Eye Exam Retinal or Dilated  05/08/2017    GLAUCOMA SCREENING Q2Y  12/21/2019    Foot Exam Q1  05/28/2020    Flu Vaccine (1) 09/01/2020    MICROALBUMIN Q1  10/15/2020       Patient Care Team   Patient Care Team:  Luis Alberto Tolbert MD as PCP - General (Internal Medicine)  Luis Alberto Tolbert MD as PCP - [facility] Empaneled Provider    History     Patient Active Problem List   Diagnosis Code    Dyslipidemia E78.5    Hypertension I10    Hypothyroidism E03.9    Memory deficit R41.3    DKA (diabetic ketoacidoses) ([address] E11.10    Vascular dementia without behavioral disturbance ([address] F01.50    H/O: CVA (cerebrovascular accident) Z80.78    Acute metabolic encephalopathy B57.76    Anemia D64.9    Venous insufficiency I87.2    Hyperglycemia due to type 1 diabetes mellitus ([address] E10.65    Hypoglycemia E16.2    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 ([address] E10.649    Dehydration E86.0    Prerenal azotemia R79.89     Past Medical History:   Diagnosis Date    Diabetes ([address]     Heart failure ([address]     unknown to family    Hypertension     Stroke [facility])       Past Surgical History:   Procedure Laterality Date    HX GYN      HX HEENT      thyroidectomy    HX HYSTERECTOMY      [place] REMOVAL GALLBLADDER      LA THYROIDECTOMY       Current Outpatient Medications   Medication Sig Dispense Refill    haloperidoL (HALDOL) 2 mg tablet Take 2 tablets twice a day 120 Tab 5    benztropine (COGENTIN) 1 mg tablet Take 1 Tab by mouth two (2) times a day. 60 Tab 11    amLODIPine (NORVASC) 10 mg tablet Take 1 Tab by mouth daily. 30 Tab 11    clopidogreL (PLAVIX) 75 mg tab TAKE 1 TABLET BY MOUTH EVERY DAY 90 Tab 3    pravastatin (PRAVACHOL) 80 mg tablet TAKE 1 TABLET BY MOUTH EVERY DAY 90 Tab 3    Insulin Needles, Disposable, (Zoey Pen Needle) 32 gauge x 5/32\" ndle Use to inject insulin four times a day. Dx.e11.9 200 Pen Needle 11    losartan-hydroCHLOROthiazide (HYZAAR) 100-25 mg per tablet TAKE 1 TABLET BY MOUTH EVERY DAY 90 Tab 3    insulin degludec Hughes Yolette FlexTouch U-100) 100 unit/mL (3 mL) inpn 16 Units by SubCUTAneous route daily. 2 Adjustable Dose Pre-filled Pen Syringe 11    insulin lispro (HUMALOG) 100 unit/mL kwikpen 3 units before meals breakfast ,4 units before lunch and 3 units before dinner 2 Adjustable Dose Pre-filled Pen Syringe 11    zinc oxide 20 % ointment Apply 1 Applicator to affected area two (2) times a day. thin layer gluteal cleft      metoprolol succinate (TOPROL-XL) 25 mg XL tablet TAKE 1 TABLET BY MOUTH EVERY DAY 90 Tab 3    levothyroxine (SYNTHROID) 175 mcg tablet TAKE 1 TABLET BY MOUTH EVERY DAY 90 Tab 3    brimonidine (ALPHAGAN) 0.15 % ophthalmic solution Administer 1 Drop to both eyes two (2) times a day.        No Known Allergies    Family History   Problem Relation Age of Onset    Diabetes Father     No Known Problems Mother      Social History     Tobacco Use    Smoking status: Never Smoker    Smokeless tobacco: Never Used   Substance Use Topics    Alcohol use: No     Comment: been years

## 2021-02-21 NOTE — PATIENT INSTRUCTIONS
Medicare Wellness Visit, Female The best way to live healthy is to have a lifestyle where you eat a well-balanced diet, exercise regularly, limit alcohol use, and quit all forms of tobacco/nicotine, if applicable. Regular preventive services are another way to keep healthy. Preventive services (vaccines, screening tests, monitoring & exams) can help personalize your care plan, which helps you manage your own care. Screening tests can find health problems at the earliest stages, when they are easiest to treat. [facility] follows the current, evidence-based guidelines published by the [facility] ([facility]) when recommending preventive services for our patients. Because we follow these guidelines, sometimes recommendations change over time as research supports it. (For example, mammograms used to be recommended annually. Even though Medicare will still pay for an annual mammogram, the newer guidelines recommend a mammogram every two years for women of average risk). Of course, you and your doctor may decide to screen more often for some diseases, based on your risk and your co-morbidities (chronic disease you are already diagnosed with). Preventive services for you include: - Medicare offers their members a free annual wellness visit, which is time for you and your primary care provider to discuss and plan for your preventive service needs. Take advantage of this benefit every year! 
-All adults over the age of 72 should receive the recommended pneumonia vaccines. Current USPSTF guidelines recommend a series of two vaccines for the best pneumonia protection.  
-All adults should have a flu vaccine yearly and a tetanus vaccine every 10 years.  
-All adults age 48 and older should receive the shingles vaccines (series of two vaccines). -All adults age 38-68 who are overweight should have a diabetes screening test once every three years. -All adults born between [birth year] and 1964 should be screened once for Hepatitis C. 
-Other screening tests and preventive services for persons with diabetes include: an eye exam to screen for diabetic retinopathy, a kidney function test, a foot exam, and stricter control over your cholesterol.  
-Cardiovascular screening for adults with routine risk involves an electrocardiogram (ECG) at intervals determined by your doctor.  
-Colorectal cancer screenings should be done for adults age 54-65 with no increased risk factors for colorectal cancer. There are a number of acceptable methods of screening for this type of cancer. Each test has its own benefits and drawbacks. Discuss with your doctor what is most appropriate for you during your annual wellness visit. The different tests include: colonoscopy (considered the best screening method), a fecal occult blood test, a fecal DNA test, and sigmoidoscopy. 
 
-A bone mass density test is recommended when a woman turns 65 to screen for osteoporosis. This test is only recommended one time, as a screening. Some providers will use this same test as a disease monitoring tool if you already have osteoporosis. -Breast cancer screenings are recommended every other year for women of normal risk, age 54-69. 
-Cervical cancer screenings for women over age 72 are only recommended with certain risk factors. Here is a list of your current Health Maintenance items (your personalized list of preventive services) with a due date: 
Health Maintenance Due Topic Date Due  
 COVID-19 Vaccine (1 of 2) [birth year]/31/1957  Shingles Vaccine (1 of 2) 12/31/1991 [facility] Exam  05/08/2017  Glaucoma Screening   12/21/2019 [facility]  05/28/2020  Yearly Flu Vaccine (1) 09/01/2020  Albumin Urine Test  10/15/2020

## 2021-04-01 ENCOUNTER — APPOINTMENT (OUTPATIENT)
Dept: GENERAL RADIOLOGY | Age: 81
End: 2021-04-01
Attending: PHYSICIAN ASSISTANT
Payer: MEDICARE

## 2021-04-01 ENCOUNTER — HOSPITAL ENCOUNTER (EMERGENCY)
Age: 81
Discharge: HOME | End: 2021-04-01
Attending: EMERGENCY MEDICINE
Payer: MEDICARE

## 2021-04-01 VITALS
WEIGHT: 128.53 LBS | RESPIRATION RATE: 16 BRPM | HEIGHT: 63 IN | DIASTOLIC BLOOD PRESSURE: 91 MMHG | HEART RATE: 89 BPM | BODY MASS INDEX: 22.77 KG/M2 | OXYGEN SATURATION: 95 % | SYSTOLIC BLOOD PRESSURE: 154 MMHG | TEMPERATURE: 98.3 F

## 2021-04-01 DIAGNOSIS — M54.50 ACUTE BILATERAL LOW BACK PAIN WITHOUT SCIATICA: ICD-10-CM

## 2021-04-01 DIAGNOSIS — M79.602 LEFT ARM PAIN: Primary | ICD-10-CM

## 2021-04-01 DIAGNOSIS — M47.816 SPONDYLOSIS OF LUMBAR REGION WITHOUT MYELOPATHY OR RADICULOPATHY: ICD-10-CM

## 2021-04-01 PROCEDURE — 99283 EMERGENCY DEPT VISIT LOW MDM: CPT

## 2021-04-01 PROCEDURE — 74011000250 HC RX REV CODE- 250: Performed by: PHYSICIAN ASSISTANT

## 2021-04-01 PROCEDURE — 72100 X-RAY EXAM L-S SPINE 2/3 VWS: CPT

## 2021-04-01 RX ORDER — LIDOCAINE 4 G/100G
1 PATCH TOPICAL
Status: DISCONTINUED | OUTPATIENT
Start: 2021-04-01 | End: 2021-04-01 | Stop reason: HOSPADM

## 2021-04-01 RX ORDER — LIDOCAINE 50 MG/G
PATCH TOPICAL
Qty: 15 EACH | Refills: 0 | Status: SHIPPED | OUTPATIENT
Start: 2021-04-01

## 2021-04-01 NOTE — ED PROVIDER NOTES
EMERGENCY DEPARTMENT HISTORY AND PHYSICAL EXAM      Date: 4/1/2021  Patient Name: Sharmaine Bro    History of Presenting Illness     Chief Complaint   Patient presents with    Arm Pain     left arm pain, chronic.  pt's son unable to tell this rn how long or if any new injury or activity or if she was just out of pain medicine caused her pain. History Provided By: Patient and Patient's Son    HPI: Sharmaine Bro, 79 y.o. female with significant PMHx as listed below, presents by POV to the ED with cc of left arm pain and lower back pain for the past month or two. She denies recent fall, trauma, or injury. She has been taking Tylenol with relief of discomfort. The pain is intermittent. At this time it is mild. She denies any exacerbating or relieving factors. She denies a history of prior back or arm problems. There are no other complaints, changes, or physical findings at this time. Social Hx: Tobacco (denies), EtOH (denies), Illicit drug use (denies)     PCP: Abigail Dumont MD    No current facility-administered medications on file prior to encounter. Current Outpatient Medications on File Prior to Encounter   Medication Sig Dispense Refill    haloperidoL (HALDOL) 2 mg tablet Take 2 tablets twice a day 120 Tab 5    benztropine (COGENTIN) 1 mg tablet Take 1 Tab by mouth two (2) times a day. 60 Tab 11    amLODIPine (NORVASC) 10 mg tablet Take 1 Tab by mouth daily. 30 Tab 11    clopidogreL (PLAVIX) 75 mg tab TAKE 1 TABLET BY MOUTH EVERY DAY 90 Tab 3    pravastatin (PRAVACHOL) 80 mg tablet TAKE 1 TABLET BY MOUTH EVERY DAY 90 Tab 3    Insulin Needles, Disposable, (Zoey Pen Needle) 32 gauge x 5/32\" ndle Use to inject insulin four times a day. Dx.e11.9 200 Pen Needle 11    losartan-hydroCHLOROthiazide (HYZAAR) 100-25 mg per tablet TAKE 1 TABLET BY MOUTH EVERY DAY 90 Tab 3    insulin degludec Margrett Anger FlexTouch U-100) 100 unit/mL (3 mL) inpn 16 Units by SubCUTAneous route daily.  2 Adjustable Dose Pre-filled Pen Syringe 11    insulin lispro (HUMALOG) 100 unit/mL kwikpen 3 units before meals breakfast ,4 units before lunch and 3 units before dinner 2 Adjustable Dose Pre-filled Pen Syringe 11    zinc oxide 20 % ointment Apply 1 Applicator to affected area two (2) times a day. thin layer gluteal cleft      metoprolol succinate (TOPROL-XL) 25 mg XL tablet TAKE 1 TABLET BY MOUTH EVERY DAY 90 Tab 3    levothyroxine (SYNTHROID) 175 mcg tablet TAKE 1 TABLET BY MOUTH EVERY DAY 90 Tab 3    brimonidine (ALPHAGAN) 0.15 % ophthalmic solution Administer 1 Drop to both eyes two (2) times a day. Past History     Past Medical History:  Past Medical History:   Diagnosis Date    Diabetes ([facility] [address]     Heart failure ([facility] [address]     unknown to family    Hypertension     [facility])        Past Surgical History:  Past Surgical History:   Procedure Laterality Date    HX GYN      HX HEENT      thyroidectomy    HX HYSTERECTOMY      CT REMOVAL GALLBLADDER      CT THYROIDECTOMY         Family History:  Family History   Problem Relation Age of Onset    Diabetes Father     No Known Problems Mother        Social History:  Social History     Tobacco Use    Smoking status: Never Smoker    Smokeless tobacco: Never Used   Substance Use Topics    Alcohol use: No     Comment: been years    Drug use: No       Allergies:  No Known Allergies      Review of Systems   Review of Systems   Constitutional: Negative for chills, diaphoresis and fever. HENT: Negative for congestion, ear pain, rhinorrhea and sore throat. Respiratory: Negative for cough and shortness of breath. Cardiovascular: Negative for chest pain. Gastrointestinal: Negative for abdominal pain, constipation, diarrhea, nausea and vomiting. Genitourinary: Negative for difficulty urinating, dysuria, frequency and hematuria. Musculoskeletal: Positive for arthralgias and back pain.  Negative for gait problem, myalgias, neck pain and neck stiffness. Neurological: Negative for headaches. All other systems reviewed and are negative. Physical Exam   Physical Exam  Vitals signs and nursing note reviewed. Constitutional:       General: She is not in acute distress. Appearance: She is well-developed. She is not diaphoretic. Comments: 79 y.o. female    HENT:      Head: Normocephalic and atraumatic. Eyes:      General:         Right eye: No discharge. Left eye: No discharge. Conjunctiva/sclera: Conjunctivae normal.   Neck:      Musculoskeletal: Normal range of motion and neck supple. Cardiovascular:      Rate and Rhythm: Normal rate and regular rhythm. Heart sounds: Normal heart sounds. No murmur. Pulmonary:      Effort: Pulmonary effort is normal. No respiratory distress. Breath sounds: Normal breath sounds. Musculoskeletal:      Comments: BACK: Normal spinal curvatures. No step off or deformity. Slight tenderness to palpation of the lower back. Negative seated SLR bilaterally. Strength of the LE 5/5 and equal bilaterally. Ambulatory without difficulty. Skin:     General: Skin is warm and dry. Neurological:      Mental Status: She is alert and oriented to person, place, and time. Psychiatric:         Behavior: Behavior normal.         Diagnostic Study Results     Labs - none    Radiologic Studies -   XR SPINE LUMB 2 OR 3 V   Final Result   No acute findings. Grade 1 anterolisthesis L4-5 and L5-S1 with   degenerative disc changes, progressed from prior CT. Osteopenia. Consider CT or   follow-up for persistent and/or high clinical suspicion. Medical Decision Making   I am the first provider for this patient. I reviewed the vital signs, available nursing notes, past medical history, past surgical history, family history and social history. Vital Signs-Reviewed the patient's vital signs.   Patient Vitals for the past 12 hrs:   Temp Pulse Resp BP SpO2   04/01/21 1646 98.3 °F (36.8 °C) 89 16 (!) 154/91 95 %       Records Reviewed: Nursing Notes and Old Medical Records    Provider Notes (Medical Decision Making):   Patient presents the ED with stable vital signs for nontraumatic arm and back pain. Differential diagnosis includes arthritis, spasm, strain, sprain, DDD, DJD, radius, sciatica. X-rays show arthritis of her lumbar back. X-rays not done of the upper extremity since she has full painless range of motion and is nontender. Recommended over-the-counter Tylenol and lidocaine patches to treat pain. She is to follow-up with orthopedics if not improved. ED Course:   Initial assessment performed. The patients presenting problems have been discussed, and they are in agreement with the care plan formulated and outlined with them. I have encouraged them to ask questions as they arise throughout their visit. Critical Care Time: None    Disposition:  DISCHARGE NOTE:  6:17 PM  The pt is ready for discharge. The pt's signs, symptoms, diagnosis, and discharge instructions have been discussed and pt has conveyed their understanding. The pt is to follow up as recommended or return to ER should their symptoms worsen. Plan has been discussed and pt is in agreement. PLAN:  1. Current Discharge Medication List      START taking these medications    Details   lidocaine (Lidoderm) 5 % Apply patch to the affected area for 12 hours a day and remove for 12 hours a day. Qty: 15 Each, Refills: 0           2. Follow-up Information     Follow up With Specialties Details Why Contact Info    Braden Prabhakar MD Internal Medicine In 1 week As needed [address]      Roger Galicia MD Orthopedic Surgery In 1 week As needed [address] [facility]  [address] [facility] [address]  [address]  [phone]          Return to ED if worse     Diagnosis     Clinical Impression:   1. Left arm pain    2. Acute bilateral low back pain without sciatica    3. Spondylosis of lumbar region without myelopathy or radiculopathy          Please note that this dictation was completed with [employer], the computer voice recognition software. Quite often unanticipated grammatical, syntax, homophones, and other interpretive errors are inadvertently transcribed by the computer software. Please disregards these errors. Please excuse any errors that have escaped final proofreading.

## 2021-04-02 ENCOUNTER — PATIENT OUTREACH (OUTPATIENT)
Dept: CASE MANAGEMENT | Age: 81
End: 2021-04-02

## 2021-04-02 NOTE — PROGRESS NOTES
Patient contacted regarding recent discharge and COVID-19 risk. Discussed COVID-19 related testing which was not done at this time. Test results were not done. Patient informed of results, if available? n/a    Outreach made within 2 business days of discharge: Yes    Care Transition Nurse/ Gloria Cheatham 429 Coordinator was not able to contact the patient by telephone to perform post discharge assessment.

## 2021-04-21 DIAGNOSIS — E03.2 HYPOTHYROIDISM DUE TO MEDICATION: ICD-10-CM

## 2021-04-21 RX ORDER — LOSARTAN POTASSIUM AND HYDROCHLOROTHIAZIDE 25; 100 MG/1; MG/1
TABLET ORAL
Qty: 90 TAB | Refills: 3 | Status: SHIPPED | OUTPATIENT
Start: 2021-04-21 | End: 2022-04-15

## 2021-04-21 RX ORDER — LEVOTHYROXINE SODIUM 175 UG/1
TABLET ORAL
Qty: 90 TAB | Refills: 3 | Status: SHIPPED | OUTPATIENT
Start: 2021-04-21 | End: 2022-04-15 | Stop reason: SDUPTHER

## 2021-05-27 RX ORDER — PEN NEEDLE, DIABETIC 31 GX3/16"
NEEDLE, DISPOSABLE MISCELLANEOUS
Qty: 200 PEN NEEDLE | Refills: 11 | Status: SHIPPED | OUTPATIENT
Start: 2021-05-27 | End: 2022-06-03

## 2021-06-01 ENCOUNTER — OFFICE VISIT (OUTPATIENT)
Dept: INTERNAL MEDICINE CLINIC | Age: 81
End: 2021-06-01
Payer: MEDICARE

## 2021-06-01 VITALS
TEMPERATURE: 98.1 F | OXYGEN SATURATION: 98 % | DIASTOLIC BLOOD PRESSURE: 70 MMHG | HEART RATE: 70 BPM | WEIGHT: 134.8 LBS | BODY MASS INDEX: 23.88 KG/M2 | HEIGHT: 63 IN | SYSTOLIC BLOOD PRESSURE: 119 MMHG | RESPIRATION RATE: 14 BRPM

## 2021-06-01 DIAGNOSIS — G47.00 INSOMNIA, UNSPECIFIED TYPE: ICD-10-CM

## 2021-06-01 DIAGNOSIS — E10.65 HYPERGLYCEMIA DUE TO TYPE 1 DIABETES MELLITUS (HCC): Primary | ICD-10-CM

## 2021-06-01 DIAGNOSIS — I10 ESSENTIAL HYPERTENSION: ICD-10-CM

## 2021-06-01 DIAGNOSIS — F01.50 VASCULAR DEMENTIA WITHOUT BEHAVIORAL DISTURBANCE (HCC): Chronic | ICD-10-CM

## 2021-06-01 DIAGNOSIS — E03.2 HYPOTHYROIDISM DUE TO MEDICATION: ICD-10-CM

## 2021-06-01 DIAGNOSIS — E78.5 DYSLIPIDEMIA: ICD-10-CM

## 2021-06-01 DIAGNOSIS — I87.2 VENOUS INSUFFICIENCY: ICD-10-CM

## 2021-06-01 DIAGNOSIS — E10.649 HYPOGLYCEMIA UNAWARENESS IN TYPE 1 DIABETES MELLITUS (HCC): ICD-10-CM

## 2021-06-01 LAB
ANION GAP SERPL CALC-SCNC: 6 MMOL/L (ref 5–15)
BUN SERPL-MCNC: 24 MG/DL (ref 6–20)
BUN/CREAT SERPL: 32 (ref 12–20)
CALCIUM SERPL-MCNC: 8.8 MG/DL (ref 8.5–10.1)
CHLORIDE SERPL-SCNC: 104 MMOL/L (ref 97–108)
CO2 SERPL-SCNC: 28 MMOL/L (ref 21–32)
CREAT SERPL-MCNC: 0.75 MG/DL (ref 0.55–1.02)
EST. AVERAGE GLUCOSE BLD GHB EST-MCNC: 252 MG/DL
GLUCOSE SERPL-MCNC: 96 MG/DL (ref 65–100)
HBA1C MFR BLD: 10.4 % (ref 4–5.6)
POTASSIUM SERPL-SCNC: 4.4 MMOL/L (ref 3.5–5.1)
SODIUM SERPL-SCNC: 138 MMOL/L (ref 136–145)
TSH SERPL DL<=0.05 MIU/L-ACNC: 1.55 UIU/ML (ref 0.36–3.74)

## 2021-06-01 PROCEDURE — 1090F PRES/ABSN URINE INCON ASSESS: CPT | Performed by: INTERNAL MEDICINE

## 2021-06-01 PROCEDURE — G8752 SYS BP LESS 140: HCPCS | Performed by: INTERNAL MEDICINE

## 2021-06-01 PROCEDURE — G8432 DEP SCR NOT DOC, RNG: HCPCS | Performed by: INTERNAL MEDICINE

## 2021-06-01 PROCEDURE — 99215 OFFICE O/P EST HI 40 MIN: CPT | Performed by: INTERNAL MEDICINE

## 2021-06-01 PROCEDURE — G8420 CALC BMI NORM PARAMETERS: HCPCS | Performed by: INTERNAL MEDICINE

## 2021-06-01 PROCEDURE — G8536 NO DOC ELDER MAL SCRN: HCPCS | Performed by: INTERNAL MEDICINE

## 2021-06-01 PROCEDURE — 1101F PT FALLS ASSESS-DOCD LE1/YR: CPT | Performed by: INTERNAL MEDICINE

## 2021-06-01 PROCEDURE — G8399 PT W/DXA RESULTS DOCUMENT: HCPCS | Performed by: INTERNAL MEDICINE

## 2021-06-01 PROCEDURE — G8754 DIAS BP LESS 90: HCPCS | Performed by: INTERNAL MEDICINE

## 2021-06-01 PROCEDURE — G8427 DOCREV CUR MEDS BY ELIG CLIN: HCPCS | Performed by: INTERNAL MEDICINE

## 2021-06-01 NOTE — PROGRESS NOTES
Chief Complaint   Patient presents with    Diabetes     routine follow up          1. Have you been to the ER, urgent care clinic since your last visit? Hospitalized since your last visit? No    2. Have you seen or consulted any other health care providers outside of the [address] [place] [address] since your last visit? Include any pap smears or colon screening.  No

## 2021-06-01 NOTE — PROGRESS NOTES
[facility]  [place] [address]  [address] [place] [address]  Phone:  [phone]  Fax: [phone]       Chief Complaint   Patient presents with    Diabetes     routine follow up    . SUBJECTIVE:    Jaya Jones is a 79 y.o. female comes in for return visit. She is accompanied by her son. Basically, she is doing reasonably well. Her dementia is getting progressively worse, so she is having major problem with her short-term memory. She is in a great setting now with her daughter who makes sure that she gets all the medications. Previous problem with her repetitive hospitalization was secondary to noncompliance with her medication because she was left to her own recognizance to take and she would often times forget. She otherwise feels well. She states her appetite is great. She denies any shortness of breath. She has a past history of primary hypertension, dyslipidemia and hypothyroidism. She is not that active. Additionally, she needs to get her COVID vaccine. Current Outpatient Medications   Medication Sig Dispense Refill    Insulin Needles, Disposable, (Zoey Pen Needle) 32 gauge x 5/32\" ndle Use to inject insulin four times a day. Dx.e11.9 200 Pen Needle 11    levothyroxine (SYNTHROID) 175 mcg tablet TAKE 1 TABLET BY MOUTH EVERY DAY 90 Tab 3    losartan-hydroCHLOROthiazide (HYZAAR) 100-25 mg per tablet TAKE 1 TABLET BY MOUTH EVERY DAY 90 Tab 3    lidocaine (Lidoderm) 5 % Apply patch to the affected area for 12 hours a day and remove for 12 hours a day. 15 Each 0    haloperidoL (HALDOL) 2 mg tablet Take 2 tablets twice a day 120 Tab 5    benztropine (COGENTIN) 1 mg tablet Take 1 Tab by mouth two (2) times a day. 60 Tab 11    amLODIPine (NORVASC) 10 mg tablet Take 1 Tab by mouth daily.  30 Tab 11    clopidogreL (PLAVIX) 75 mg tab TAKE 1 TABLET BY MOUTH EVERY DAY 90 Tab 3    pravastatin (PRAVACHOL) 80 mg tablet TAKE 1 TABLET BY MOUTH EVERY DAY 90 Tab 3    insulin degludec Jacquiline Goff FlexTouch U-100) 100 unit/mL (3 mL) inpn 16 Units by SubCUTAneous route daily. 2 Adjustable Dose Pre-filled Pen Syringe 11    insulin lispro (HUMALOG) 100 unit/mL kwikpen 3 units before meals breakfast ,4 units before lunch and 3 units before dinner 2 Adjustable Dose Pre-filled Pen Syringe 11    zinc oxide 20 % ointment Apply 1 Applicator to affected area two (2) times a day. thin layer gluteal cleft      metoprolol succinate (TOPROL-XL) 25 mg XL tablet TAKE 1 TABLET BY MOUTH EVERY DAY 90 Tab 3    brimonidine (ALPHAGAN) 0.15 % ophthalmic solution Administer 1 Drop to both eyes two (2) times a day.        Past Medical History:   Diagnosis Date    [facility]     Heart failure ([facility]     unknown to family    Hypertension     Stroke [facility])      Past Surgical History:   Procedure Laterality Date    HX GYN      HX HEENT      thyroidectomy    HX HYSTERECTOMY      IN REMOVAL GALLBLADDER      IN THYROIDECTOMY       No Known Allergies      REVIEW OF SYSTEMS:  General: negative for - chills or fever  ENT: negative for - headaches, nasal congestion or tinnitus  Respiratory: negative for - cough, hemoptysis, shortness of breath or wheezing  Cardiovascular : negative for - chest pain, edema, palpitations or shortness of breath  Gastrointestinal: negative for - abdominal pain, blood in stools, heartburn or nausea/vomiting  Genito-Urinary: no dysuria, trouble voiding, or hematuria  Musculoskeletal: negative for - gait disturbance, joint pain, joint stiffness or joint swelling  Neurological: no TIA or stroke symptoms  Hematologic: no bruises, no bleeding, no swollen glands  Integument: no lumps, mole changes, nail changes or rash  Endocrine: no malaise/lethargy or unexpected weight changes      Social History     Socioeconomic History    Marital status:      Spouse name: Not on file    Number of children: 1    Years of education: Not on file    Highest education level: Not on file   Occupational History    Occupation: retired   Tobacco Use    Smoking status: Never Smoker    Smokeless tobacco: Never Used   Vaping Use    Vaping Use: Never used   Substance and Sexual Activity    Alcohol use: No     Comment: been years    Drug use: No    Sexual activity: Not Currently     Social Determinants of Health     Financial Resource Strain:     Difficulty of Paying Living Expenses:    Food Insecurity:     Worried About Running Out of Food in the Last Year:     Ran Out of Food in the Last Year:    Transportation Needs:     Lack of Transportation (Medical):  Lack of Transportation (Non-Medical):    Physical Activity:     Days of Exercise per Week:     Minutes of Exercise per Session:    Stress:     Feeling of Stress :    Social Connections:     Frequency of Communication with Friends and Family:     Frequency of Social Gatherings with Friends and Family:     Attends Anabaptism Services:     Active Member of Clubs or Organizations:     Attends Club or Organization Meetings:     Marital Status:      Family History   Problem Relation Age of Onset    Diabetes Father     No Known Problems Mother        OBJECTIVE:    Visit Vitals  /70   Pulse 70   Temp 98.1 °F (36.7 °C) (Oral)   Resp 14   Ht 5' 3\" (1.6 m)   Wt 134 lb 12.8 oz (61.1 kg)   SpO2 98%   BMI 23.88 kg/m²     CONSTITUTIONAL: well , well nourished, appears age appropriate  EYES: perrla, eom intact  ENMT:moist mucous membranes, pharynx clear  NECK: supple. Thyroid normal  RESPIRATORY: Chest: clear to ascultation and percussion   CARDIOVASCULAR: Heart: regular rate and rhythm  GASTROINTESTINAL: Abdomen: soft, bowel sounds active  HEMATOLOGIC: no pathological lymph nodes palpated  MUSCULOSKELETAL: Extremities: no edema, pulse 1+   INTEGUMENT: No unusual rashes or suspicious skin lesions noted.  Nails appear normal.  NEUROLOGIC: non-focal exam   MENTAL STATUS: alert and oriented, appropriate affect      ASSESSMENT:  1. Hyperglycemia due to type 1 diabetes mellitus ([facility]    2. 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 Venous insufficiency    4. Essential hypertension    5. Hypothyroidism due to medication    6. Vascular dementia without behavioral disturbance ([facility]    7. Dyslipidemia    8. Insomnia, unspecified type        PLAN:  1. The patient has mild venous insufficiency and nothing needs to be done. 2. Blood pressure is excellent, no adjustments are made. 3. She remains on a thyroid supplement, and efficacy and compliance will be assessed. 4. Unfortunately, dementia is getting progressively worse, which is the reason she is now living with her daughter. She is quite forgetful and is incapable of administrating her medication herself. 5. She has a significant increased cardiovascular risk and remains on a statin. 6. From a diabetic perspective, she has not been hospitalized since December of last year. This represents a significant change because she has been hospitalized almost weekly, but this was now related to exclusive noncompliance with her regimen by not taking her medication most of the time. This is not the case currently. In addition to that, this is currently complicated by hypoglycemic unawareness. She has not had to go to the Emergency Room for hypoglycemia and for that matter for DKA. .  Orders Placed This Encounter    METABOLIC PANEL, BASIC    HEMOGLOBIN A1C WITH EAG    TSH 3RD GENERATION         Follow-up and Dispositions    · Return in about 6 weeks (around 7/13/2021).            Otis Chacko MD

## 2021-09-01 ENCOUNTER — OFFICE VISIT (OUTPATIENT)
Dept: INTERNAL MEDICINE CLINIC | Age: 81
End: 2021-09-01
Payer: MEDICARE

## 2021-09-01 VITALS
WEIGHT: 134.1 LBS | HEART RATE: 73 BPM | SYSTOLIC BLOOD PRESSURE: 183 MMHG | HEIGHT: 63 IN | DIASTOLIC BLOOD PRESSURE: 141 MMHG | TEMPERATURE: 98.5 F | RESPIRATION RATE: 14 BRPM | BODY MASS INDEX: 23.76 KG/M2 | OXYGEN SATURATION: 95 %

## 2021-09-01 DIAGNOSIS — F01.50 VASCULAR DEMENTIA WITHOUT BEHAVIORAL DISTURBANCE (HCC): Chronic | ICD-10-CM

## 2021-09-01 DIAGNOSIS — E78.5 DYSLIPIDEMIA: ICD-10-CM

## 2021-09-01 DIAGNOSIS — E10.649 HYPOGLYCEMIA UNAWARENESS IN TYPE 1 DIABETES MELLITUS (HCC): ICD-10-CM

## 2021-09-01 DIAGNOSIS — E03.2 HYPOTHYROIDISM DUE TO MEDICATION: ICD-10-CM

## 2021-09-01 DIAGNOSIS — I10 ESSENTIAL HYPERTENSION: ICD-10-CM

## 2021-09-01 DIAGNOSIS — E10.65 HYPERGLYCEMIA DUE TO TYPE 1 DIABETES MELLITUS (HCC): Primary | ICD-10-CM

## 2021-09-01 PROCEDURE — G8420 CALC BMI NORM PARAMETERS: HCPCS | Performed by: INTERNAL MEDICINE

## 2021-09-01 PROCEDURE — 99214 OFFICE O/P EST MOD 30 MIN: CPT | Performed by: INTERNAL MEDICINE

## 2021-09-01 PROCEDURE — 1090F PRES/ABSN URINE INCON ASSESS: CPT | Performed by: INTERNAL MEDICINE

## 2021-09-01 PROCEDURE — G8753 SYS BP > OR = 140: HCPCS | Performed by: INTERNAL MEDICINE

## 2021-09-01 PROCEDURE — G8536 NO DOC ELDER MAL SCRN: HCPCS | Performed by: INTERNAL MEDICINE

## 2021-09-01 PROCEDURE — G8427 DOCREV CUR MEDS BY ELIG CLIN: HCPCS | Performed by: INTERNAL MEDICINE

## 2021-09-01 PROCEDURE — G8399 PT W/DXA RESULTS DOCUMENT: HCPCS | Performed by: INTERNAL MEDICINE

## 2021-09-01 PROCEDURE — G8755 DIAS BP > OR = 90: HCPCS | Performed by: INTERNAL MEDICINE

## 2021-09-01 PROCEDURE — G8432 DEP SCR NOT DOC, RNG: HCPCS | Performed by: INTERNAL MEDICINE

## 2021-09-01 PROCEDURE — 1101F PT FALLS ASSESS-DOCD LE1/YR: CPT | Performed by: INTERNAL MEDICINE

## 2021-09-01 NOTE — PROGRESS NOTES
Chief Complaint   Patient presents with    Blood sugar problem     3 month followu up         1. Have you been to the ER, urgent care clinic since your last visit? Hospitalized since your last visit? No    2. Have you seen or consulted any other health care providers outside of the [address] [place] [address] since your last visit? Include any pap smears or colon screening.  No

## 2021-09-02 LAB
ANION GAP SERPL CALC-SCNC: 7 MMOL/L (ref 5–15)
BUN SERPL-MCNC: 19 MG/DL (ref 6–20)
BUN/CREAT SERPL: 21 (ref 12–20)
CALCIUM SERPL-MCNC: 8.3 MG/DL (ref 8.5–10.1)
CHLORIDE SERPL-SCNC: 104 MMOL/L (ref 97–108)
CO2 SERPL-SCNC: 26 MMOL/L (ref 21–32)
CREAT SERPL-MCNC: 0.91 MG/DL (ref 0.55–1.02)
EST. AVERAGE GLUCOSE BLD GHB EST-MCNC: 283 MG/DL
GLUCOSE SERPL-MCNC: 272 MG/DL (ref 65–100)
HBA1C MFR BLD: 11.5 % (ref 4–5.6)
POTASSIUM SERPL-SCNC: 4.2 MMOL/L (ref 3.5–5.1)
SODIUM SERPL-SCNC: 137 MMOL/L (ref 136–145)
TSH SERPL DL<=0.05 MIU/L-ACNC: 0.13 UIU/ML (ref 0.36–3.74)

## 2021-09-05 NOTE — PROGRESS NOTES
[facility]  [place] [address]  [address] [place] [address]  Phone:  [phone]  Fax: [phone]       Chief Complaint   Patient presents with    Blood sugar problem     3 month followu up   . SUBJECTIVE:    Jenna Pisano is a 79 y.o. female comes in for return visit, accompanied by her son. She is quite upbeat. Her dementia is getting progressively worse. So, her short-term memory is very poor. Since she has been under the auspices of her daughter, she has not been re-hospitalized again. It basically confirmed the fact that when the patient was living with her son, she was not taking her insulin, which led to repetitive hospitalizations. We also reinforced the fact that regardless of the fine tuning that was attempted at the time of discharge, it was an exercise in futility because she was never taking the insulin. In any event, she is doing better. Her last hemoglobin A1c was great, but at least she has not been hospitalized. Complicating her diabetes is hypoglycemic unawareness. She has a past history of primary hypertension, dyslipidemia and hypothyroidism. Her mobility is great. Current Outpatient Medications   Medication Sig Dispense Refill    brimonidine (ALPHAGAN) 0.15 % ophthalmic solution Administer 1 Drop to both eyes two (2) times a day. 15 mL 5    Insulin Needles, Disposable, (Zoey Pen Needle) 32 gauge x 5/32\" ndle Use to inject insulin four times a day. Dx.e11.9 200 Pen Needle 11    levothyroxine (SYNTHROID) 175 mcg tablet TAKE 1 TABLET BY MOUTH EVERY DAY 90 Tab 3    losartan-hydroCHLOROthiazide (HYZAAR) 100-25 mg per tablet TAKE 1 TABLET BY MOUTH EVERY DAY 90 Tab 3    lidocaine (Lidoderm) 5 % Apply patch to the affected area for 12 hours a day and remove for 12 hours a day.  15 Each 0    haloperidoL (HALDOL) 2 mg tablet Take 2 tablets twice a day 120 Tab 5    benztropine (COGENTIN) 1 mg tablet Take 1 Tab by mouth two (2) times a day. 60 Tab 11    amLODIPine (NORVASC) 10 mg tablet Take 1 Tab by mouth daily. 30 Tab 11    clopidogreL (PLAVIX) 75 mg tab TAKE 1 TABLET BY MOUTH EVERY DAY 90 Tab 3    pravastatin (PRAVACHOL) 80 mg tablet TAKE 1 TABLET BY MOUTH EVERY DAY 90 Tab 3    insulin degludec Columba Hammans FlexTouch U-100) 100 unit/mL (3 mL) inpn 16 Units by SubCUTAneous route daily. 2 Adjustable Dose Pre-filled Pen Syringe 11    insulin lispro (HUMALOG) 100 unit/mL kwikpen 3 units before meals breakfast ,4 units before lunch and 3 units before dinner 2 Adjustable Dose Pre-filled Pen Syringe 11    zinc oxide 20 % ointment Apply 1 Applicator to affected area two (2) times a day.  thin layer gluteal cleft      metoprolol succinate (TOPROL-XL) 25 mg XL tablet TAKE 1 TABLET BY MOUTH EVERY DAY 90 Tab 3     Past Medical History:   Diagnosis Date    Diabetes ([facility]     Heart failure ([facility]     unknown to family    Hypertension     Stroke [facility])      Past Surgical History:   Procedure Laterality Date    HX GYN      HX HEENT      thyroidectomy    HX HYSTERECTOMY      CA REMOVAL GALLBLADDER      CA THYROIDECTOMY       No Known Allergies      REVIEW OF SYSTEMS:  General: negative for - chills or fever  ENT: negative for - headaches, nasal congestion or tinnitus  Respiratory: negative for - cough, hemoptysis, shortness of breath or wheezing  Cardiovascular : negative for - chest pain, edema, palpitations or shortness of breath  Gastrointestinal: negative for - abdominal pain, blood in stools, heartburn or nausea/vomiting  Genito-Urinary: no dysuria, trouble voiding, or hematuria  Musculoskeletal: negative for - gait disturbance, joint pain, joint stiffness or joint swelling  Neurological: no TIA or stroke symptoms  Hematologic: no bruises, no bleeding, no swollen glands  Integument: no lumps, mole changes, nail changes or rash  Endocrine: no malaise/lethargy or unexpected weight changes      Social History     Socioeconomic History    Marital status:      Spouse name: Not on file    Number of children: 1    Years of education: Not on file    Highest education level: Not on file   Occupational History    Occupation: retired   Tobacco Use    Smoking status: Never Smoker    Smokeless tobacco: Never Used   Vaping Use    Vaping Use: Never used   Substance and Sexual Activity    Alcohol use: No     Comment: been years    Drug use: No    Sexual activity: Not Currently     Social Determinants of Health     Financial Resource Strain:     Difficulty of Paying Living Expenses:    Food Insecurity:     Worried About Running Out of Food in the Last Year:     [address] in the Last Year:    Transportation Needs:     Lack of Transportation (Medical):  Lack of Transportation (Non-Medical):    Physical Activity:     Days of Exercise per Week:     Minutes of Exercise per Session:    Stress:     Feeling of Stress :    Social Connections:     Frequency of Communication with Friends and Family:     Frequency of Social Gatherings with Friends and Family:     Attends Evangelical Services:     Active Member of Clubs or Organizations:     Attends Club or Organization Meetings:     Marital Status:      Family History   Problem Relation Age of Onset    Diabetes Father     No Known Problems Mother        OBJECTIVE:    Visit Vitals  BP (!) 183/141   Pulse 73   Temp 98.5 °F (36.9 °C) (Oral)   Resp 14   Ht 5' 3\" (1.6 m)   Wt 134 lb 1.6 oz (60.8 kg)   SpO2 95%   BMI 23.75 kg/m²     CONSTITUTIONAL: well , well nourished, appears age appropriate  EYES: perrla, eom intact  ENMT:moist mucous membranes, pharynx clear  NECK: supple.  Thyroid normal  RESPIRATORY: Chest: clear to ascultation and percussion   CARDIOVASCULAR: Heart: regular rate and rhythm  GASTROINTESTINAL: Abdomen: soft, bowel sounds active  HEMATOLOGIC: no pathological lymph nodes palpated  MUSCULOSKELETAL: Extremities: no edema, pulse 1+   INTEGUMENT: No unusual rashes or suspicious skin lesions noted. Nails appear normal.  NEUROLOGIC: non-focal exam   MENTAL STATUS: alert and oriented, appropriate affect      ASSESSMENT:  1. Hyperglycemia due to type 1 diabetes mellitus ([facility] [address]    2. 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 Essential hypertension    4. Hypothyroidism due to medication    5. Dyslipidemia    6. Vascular dementia without behavioral disturbance ([facility]        PLAN:  1. The patient's blood pressure is reasonable today. No adjustments are made. 2. Her diabetes hopefully is better than it was before. I am always leery about pushing her because ideally the optimal hemoglobin A1c for her would be 8.5. So, technically anything less than 9 is adequate. Given her age as well as existing comorbidities as well as the presence of hypoglycemic unawareness, this is more than adequate. I will await the results of her hemoglobin A1c.  3. She does have a history of hypothyroidism, and I will await the TSH for efficacy and compliance. 4. Her vascular dementia is gradually getting worse, but she is able to communicate. Her cognitive function is progressively decreasing however. 5. She needs to continue her statin in view of her increased cardiovascular risk. .  Orders Placed This Encounter    METABOLIC PANEL, BASIC    HEMOGLOBIN A1C WITH EAG    TSH 3RD GENERATION         Follow-up and Dispositions    · Return in about 6 weeks (around 10/13/2021).            Tai Joel MD

## 2021-09-15 ENCOUNTER — TELEPHONE (OUTPATIENT)
Dept: INTERNAL MEDICINE CLINIC | Age: 81
End: 2021-09-15

## 2021-09-15 NOTE — TELEPHONE ENCOUNTER
Spoke with patient daughter ms. Berrios and ask her to increase her insulin by 3 units so her new dose of insulin is 19 units per day

## 2021-09-28 ENCOUNTER — HOSPITAL ENCOUNTER (EMERGENCY)
Age: 81
Discharge: HOME | End: 2021-09-28
Attending: EMERGENCY MEDICINE
Payer: MEDICARE

## 2021-09-28 VITALS
OXYGEN SATURATION: 100 % | RESPIRATION RATE: 18 BRPM | DIASTOLIC BLOOD PRESSURE: 60 MMHG | SYSTOLIC BLOOD PRESSURE: 131 MMHG | HEART RATE: 80 BPM | TEMPERATURE: 98.1 F

## 2021-09-28 DIAGNOSIS — E16.2 HYPOGLYCEMIA: Primary | ICD-10-CM

## 2021-09-28 DIAGNOSIS — N30.00 ACUTE CYSTITIS WITHOUT HEMATURIA: ICD-10-CM

## 2021-09-28 LAB
ANION GAP SERPL CALC-SCNC: 6 MMOL/L (ref 5–15)
APPEARANCE UR: ABNORMAL
BACTERIA URNS QL MICRO: ABNORMAL /HPF
BILIRUB UR QL: NEGATIVE
BUN SERPL-MCNC: 21 MG/DL (ref 6–20)
BUN/CREAT SERPL: 29 (ref 12–20)
CALCIUM SERPL-MCNC: 8.7 MG/DL (ref 8.5–10.1)
CHLORIDE SERPL-SCNC: 105 MMOL/L (ref 97–108)
CO2 SERPL-SCNC: 26 MMOL/L (ref 21–32)
COLOR UR: ABNORMAL
CREAT SERPL-MCNC: 0.72 MG/DL (ref 0.55–1.02)
EPITH CASTS URNS QL MICRO: ABNORMAL /LPF
GLUCOSE BLD STRIP.AUTO-MCNC: 111 MG/DL (ref 65–117)
GLUCOSE BLD STRIP.AUTO-MCNC: 259 MG/DL (ref 65–117)
GLUCOSE BLD STRIP.AUTO-MCNC: 329 MG/DL (ref 65–117)
GLUCOSE SERPL-MCNC: 107 MG/DL (ref 65–100)
GLUCOSE UR STRIP.AUTO-MCNC: NEGATIVE MG/DL
HGB UR QL STRIP: NEGATIVE
HYALINE CASTS URNS QL MICRO: ABNORMAL /LPF (ref 0–5)
KETONES UR QL STRIP.AUTO: NEGATIVE MG/DL
LEUKOCYTE ESTERASE UR QL STRIP.AUTO: ABNORMAL
NITRITE UR QL STRIP.AUTO: POSITIVE
PH UR STRIP: 7 [PH] (ref 5–8)
POTASSIUM SERPL-SCNC: 3.4 MMOL/L (ref 3.5–5.1)
PROT UR STRIP-MCNC: NEGATIVE MG/DL
RBC #/AREA URNS HPF: ABNORMAL /HPF (ref 0–5)
SERVICE CMNT-IMP: ABNORMAL
SERVICE CMNT-IMP: ABNORMAL
SERVICE CMNT-IMP: NORMAL
SODIUM SERPL-SCNC: 137 MMOL/L (ref 136–145)
SP GR UR REFRACTOMETRY: 1.01 (ref 1–1.03)
UA: UC IF INDICATED,UAUC: ABNORMAL
UROBILINOGEN UR QL STRIP.AUTO: 1 EU/DL (ref 0.2–1)
WBC URNS QL MICRO: ABNORMAL /HPF (ref 0–4)

## 2021-09-28 PROCEDURE — 80048 BASIC METABOLIC PNL TOTAL CA: CPT

## 2021-09-28 PROCEDURE — 87186 SC STD MICRODIL/AGAR DIL: CPT

## 2021-09-28 PROCEDURE — 87077 CULTURE AEROBIC IDENTIFY: CPT

## 2021-09-28 PROCEDURE — 36415 COLL VENOUS BLD VENIPUNCTURE: CPT

## 2021-09-28 PROCEDURE — 81001 URINALYSIS AUTO W/SCOPE: CPT

## 2021-09-28 PROCEDURE — 82962 GLUCOSE BLOOD TEST: CPT

## 2021-09-28 PROCEDURE — 99285 EMERGENCY DEPT VISIT HI MDM: CPT

## 2021-09-28 PROCEDURE — 87086 URINE CULTURE/COLONY COUNT: CPT

## 2021-09-28 RX ORDER — CEPHALEXIN 500 MG/1
500 CAPSULE ORAL 4 TIMES DAILY
Qty: 28 CAPSULE | Refills: 0 | Status: SHIPPED | OUTPATIENT
Start: 2021-09-28 | End: 2021-10-05

## 2021-09-28 NOTE — ED NOTES
Spoke with Chantale Parrish ([phone]) who reports that he or his sister will come pick the patient up.

## 2021-09-28 NOTE — ED PROVIDER NOTES
EMERGENCY DEPARTMENT HISTORY AND PHYSICAL EXAM          Date: 9/28/2021  Patient Name: Leighann Bowles  Attending of Record: Dee Yancey MD    History of Presenting Illness     Chief Complaint   Patient presents with    Low Blood Sugar       History Provided By: Patient and EMS    HPI: Leighann Bowles is a 79 y.o. female PMH of HTN, IDDM, CVA, CHF who is presenting to the emergency department today with hypoglycemia. Per EMS report, patient had taken her regular insulin prior to eating lunch. She then became hypoglycemic and altered so EMS was called to the scene. She received glucagon with improvement in her mental status. On arrival here she is alert and oriented. She has no other complaints at this time, and had been feeling well previously. Denies any fever, chest pain, shortness of breath, abdominal pain, N/V/D, dysuria, rash. PCP: Kiko Zimmerman MD    There are no other complaints, changes, or physical findings at this time. Current Outpatient Medications   Medication Sig Dispense Refill    cephALEXin (Keflex) 500 mg capsule Take 1 Capsule by mouth four (4) times daily for 7 days. 28 Capsule 0    brimonidine (ALPHAGAN) 0.15 % ophthalmic solution Administer 1 Drop to both eyes two (2) times a day. 15 mL 5    Insulin Needles, Disposable, (Zoey Pen Needle) 32 gauge x 5/32\" ndle Use to inject insulin four times a day. Dx.e11.9 200 Pen Needle 11    levothyroxine (SYNTHROID) 175 mcg tablet TAKE 1 TABLET BY MOUTH EVERY DAY 90 Tab 3    losartan-hydroCHLOROthiazide (HYZAAR) 100-25 mg per tablet TAKE 1 TABLET BY MOUTH EVERY DAY 90 Tab 3    lidocaine (Lidoderm) 5 % Apply patch to the affected area for 12 hours a day and remove for 12 hours a day. 15 Each 0    haloperidoL (HALDOL) 2 mg tablet Take 2 tablets twice a day 120 Tab 5    benztropine (COGENTIN) 1 mg tablet Take 1 Tab by mouth two (2) times a day. 60 Tab 11    amLODIPine (NORVASC) 10 mg tablet Take 1 Tab by mouth daily.  30 Tab 11    clopidogreL (PLAVIX) 75 mg tab TAKE 1 TABLET BY MOUTH EVERY DAY 90 Tab 3    pravastatin (PRAVACHOL) 80 mg tablet TAKE 1 TABLET BY MOUTH EVERY DAY 90 Tab 3    insulin degludec Brianna Robe FlexTouch U-100) 100 unit/mL (3 mL) inpn 16 Units by SubCUTAneous route daily. 2 Adjustable Dose Pre-filled Pen Syringe 11    insulin lispro (HUMALOG) 100 unit/mL kwikpen 3 units before meals breakfast ,4 units before lunch and 3 units before dinner 2 Adjustable Dose Pre-filled Pen Syringe 11    zinc oxide 20 % ointment Apply 1 Applicator to affected area two (2) times a day. thin layer gluteal cleft      metoprolol succinate (TOPROL-XL) 25 mg XL tablet TAKE 1 TABLET BY MOUTH EVERY DAY 90 Tab 3       Past History     Past Medical History:  Past Medical History:   Diagnosis Date    [facility] [address] [facility]     Heart failure ([facility] [address] [facility]     unknown to family    Hypertension     Stroke [facility])        Past Surgical History:  Past Surgical History:   Procedure Laterality Date    HX GYN      HX HEENT      thyroidectomy    HX HYSTERECTOMY      [place] REMOVAL GALLBLADDER      HI THYROIDECTOMY         Family History:  Family History   Problem Relation Age of Onset    Diabetes Father     No Known Problems Mother        Social History:  Social History     Tobacco Use    Smoking status: Never Smoker    Smokeless tobacco: Never Used   Vaping Use    Vaping Use: Never used   Substance Use Topics    Alcohol use: No     Comment: been years    Drug use: No       Allergies:  No Known Allergies      Review of Systems   Review of Systems   Constitutional: Negative for fever. HENT: Negative for congestion. Respiratory: Negative for shortness of breath. Cardiovascular: Negative for chest pain. Gastrointestinal: Negative for abdominal pain, diarrhea and vomiting. Endocrine:        Hypoglycemia   Genitourinary: Negative for dysuria and hematuria. Skin: Negative for rash. Neurological: Negative for dizziness and headaches.        Physical Exam Physical Exam  Constitutional:       General: She is not in acute distress. Appearance: Normal appearance. She is not toxic-appearing. HENT:      Head: Normocephalic and atraumatic. Nose: Nose normal.   Eyes:      Conjunctiva/sclera: Conjunctivae normal.   Cardiovascular:      Rate and Rhythm: Normal rate. Comments: Normal peripheral perfusion  Pulmonary:      Effort: Pulmonary effort is normal. No respiratory distress. Abdominal:      General: There is no distension. Musculoskeletal:         General: No swelling or deformity. Cervical back: Neck supple. Skin:     General: Skin is warm and dry. Neurological:      Mental Status: She is alert and oriented to person, place, and time.       Comments: Moving all extremities spontaneously    Psychiatric:         Mood and Affect: Mood normal.         Behavior: Behavior normal.         Diagnostic Study Results     Labs -     Recent Results (from the past 12 hour(s))   GLUCOSE, POC    Collection Time: 09/28/21  2:23 PM   Result Value Ref Range    Glucose (POC) 111 65 - 117 mg/dL    Performed by Sarina Chacko    URINALYSIS W/ REFLEX CULTURE    Collection Time: 09/28/21  2:25 PM    Specimen: Urine   Result Value Ref Range    Color YELLOW/STRAW      Appearance CLOUDY (A) CLEAR      Specific gravity 1.013 1.003 - 1.030      pH (UA) 7.0 5.0 - 8.0      Protein Negative NEG mg/dL    Glucose Negative NEG mg/dL    Ketone Negative NEG mg/dL    Bilirubin Negative NEG      Blood Negative NEG      Urobilinogen 1.0 0.2 - 1.0 EU/dL    Nitrites Positive (A) NEG      Leukocyte Esterase MODERATE (A) NEG      WBC 20-50 0 - 4 /hpf    RBC 0-5 0 - 5 /hpf    Epithelial cells FEW FEW /lpf    Bacteria 4+ (A) NEG /hpf    UA:UC IF INDICATED URINE CULTURE ORDERED (A) CNI      Hyaline cast 0-2 0 - 5 /lpf   METABOLIC PANEL, BASIC    Collection Time: 09/28/21  2:43 PM   Result Value Ref Range    Sodium 137 136 - 145 mmol/L    Potassium 3.4 (L) 3.5 - 5.1 mmol/L    Chloride 105 97 - 108 mmol/L    CO2 26 21 - 32 mmol/L    Anion gap 6 5 - 15 mmol/L    Glucose 107 (H) 65 - 100 mg/dL    BUN 21 (H) 6 - 20 MG/DL    Creatinine 0.72 0.55 - 1.02 MG/DL    BUN/Creatinine ratio 29 (H) 12 - 20      GFR est AA >60 >60 ml/min/1.73m2    GFR est non-AA >60 >60 ml/min/1.73m2    Calcium 8.7 8.5 - 10.1 MG/DL   GLUCOSE, POC    Collection Time: 09/28/21  3:42 PM   Result Value Ref Range    Glucose (POC) 259 (H) 65 - 117 mg/dL    Performed by Yves Cease    GLUCOSE, POC    Collection Time: 09/28/21  4:50 PM   Result Value Ref Range    Glucose (POC) 329 (H) 65 - 117 mg/dL    Performed by Yves Cease        Radiologic Studies -   No orders to display     CT Results  (Last 48 hours)    None        CXR Results  (Last 48 hours)    None            Medical Decision Making   I am the first provider for this patient. I reviewed the vital signs, available nursing notes, past medical history, past surgical history, family history and social history. Vital Signs-Reviewed the patient's vital signs. Patient Vitals for the past 12 hrs:   Temp Pulse Resp BP SpO2   09/28/21 1428 98.1 °F (36.7 °C) 80 18 131/60 100 %       Records Reviewed: Nursing Notes and Old Medical Records    Provider Notes (Medical Decision Making):   Coral Granger is a 61-year-old female with history as mentioned above who is presenting to the emergency department with hypoglycemia that has since resolved. She has no other complaints at this time. BMP, CBC, UA ordered. Patient given food. Blood glucose recheck was in the 200s. Patient's UA significant for UTI. Patient will be discharged with Keflex. ED Course and Progress Notes:   Initial assessment performed. The patients presenting problems have been discussed, and they are in agreement with the care plan formulated and outlined with them. I have encouraged them to ask questions as they arise throughout their visit.     ED Course as of Sep 28 1701   Tue Sep 28, 2021   1440 Patient awake and alert at this time, without complaint obtaining IV access, she is eating peanut butter crackers    [WB]   1516 BMP with mild hyperglycemia 107, normal bicarb potassium mildly low at 3.4    [WB]   1517 She has a urinary tract infection with positive nitrites moderate leukocyte esterase, sent for culture    [WB]   1517 She continues to look well, will trend glucose to ensure she remains normoglycemic    [WB]   1555 Repeat glucose 260    [WB]   1557 Microbiology data chart reviewed demonstrating urine cultures with Klebsiella which is pansensitive, mixed urogenital africa. Will plan course of cephalexin    [WB]      ED Course User Index  [WB] Kevin Ortiz MD         Diagnosis     Clinical Impression:   1. Hypoglycemia    2. Acute cystitis without hematuria        Disposition:  Discharged home    DISCHARGE PLAN:    1. Current Discharge Medication List      START taking these medications    Details   cephALEXin (Keflex) 500 mg capsule Take 1 Capsule by mouth four (4) times daily for 7 days. Qty: 28 Capsule, Refills: 0  Start date: 9/28/2021, End date: 10/5/2021           2. Follow-up Information     Follow up With Specialties Details Why Contact Info    Nick Da Silva MD Internal Medicine Schedule an appointment as soon as possible for a visit   [place] [address]  [place] [address] [phone]          3.  Return to ED if worse         Resident Signature:   Signed By: Simon Zarate MD     September 28, 2021

## 2021-09-28 NOTE — ED NOTES
Patient arrives to the emergency department via EMS with a chief complaint of hypoglycemia. Pt took insulin from home health nurse before eating and then became altered and minimally responsive. BG 50 for EMS on arrival they gave 1mg of IM glucagon BG azalia to 80 and patient became more awake and alert.  on arrival to the ED.

## 2021-09-28 NOTE — ED NOTES
Called dietary for a meal tray. Provided patient with apple juice and crackers while she waits for tray to come.

## 2021-09-29 ENCOUNTER — PATIENT OUTREACH (OUTPATIENT)
Dept: CASE MANAGEMENT | Age: 81
End: 2021-09-29

## 2021-09-30 ENCOUNTER — TELEPHONE (OUTPATIENT)
Dept: INTERNAL MEDICINE CLINIC | Age: 81
End: 2021-09-30

## 2021-09-30 LAB
BACTERIA SPEC CULT: ABNORMAL
CC UR VC: ABNORMAL
SERVICE CMNT-IMP: ABNORMAL

## 2021-09-30 NOTE — TELEPHONE ENCOUNTER
Patient daughter called stating that she has had 2 low blood sugar reactions and had to call EMS, she is taking 19 units insulin . Per Dr. Sj Salinas patient to decrease insulin to 14 units.

## 2021-09-30 NOTE — PROGRESS NOTES
ACM attempted to follow up with patient for CCM assessment. Attempts to reach patient were unsuccessful. On final call a VM was left for patient with the following information: ACM contact information and reason for call. ACM will outreach again in 7 days.

## 2021-10-07 ENCOUNTER — PATIENT OUTREACH (OUTPATIENT)
Dept: CASE MANAGEMENT | Age: 81
End: 2021-10-07

## 2021-10-07 NOTE — PROGRESS NOTES
Ambulatory Care Management Note    Date/Time:  10/7/2021 12:50 PM    This patient was received as a referral from Daily assignment. Ambulatory Care Manager outreached to patient today to offer care management services. Introduction to self and role of care manager provided. Patient accepted care management services at this time. Follow up call scheduled at this time. Patient has Ambulatory Care Manager's contact number for for any questions or concerns. Next PCP follow up scheduled for 12/1/21.

## 2021-10-15 ENCOUNTER — PATIENT OUTREACH (OUTPATIENT)
Dept: CASE MANAGEMENT | Age: 81
End: 2021-10-15

## 2021-10-15 NOTE — PROGRESS NOTES
Ambulatory Care Management Note    Date/Time:  10/15/2021 3:32 PM    This Ambulatory Care Manager (ACM) reviewed and updated the following screenings during this call; self management assessment    Patient's challenges to self management identified:   functional physical ability      Medication Management:  good understanding    Advance Care Planning:   Does patient have an Advance Directive:  [address] on [employer], Referrals, and Durable Medical Equipment:       Health Maintenance Due   Topic Date Due    Hepatitis C Screening  Never done    Eye Exam Retinal or Dilated  05/08/2017    Foot Exam Q1  05/28/2020    MICROALBUMIN Q1  10/15/2020    COVID-19 Vaccine (2 - Moderna 2-dose series) 08/20/2021    Flu Vaccine (1) Never done     Health Maintenance reviewed - yes. Patient was asked to consider health care goals that they would like to focus on with this ACM. ACM will follow up with patient to discuss goals and establish care plan in the next 7-14 days.        PCP/Specialist follow up:   Future Appointments   Date Time Provider Tawnya Evans   11/2/2021  3:30 PM Beatrice Olivares MD [facility] MAIN BS AMB   12/1/2021 12:00 PM Beatrice Olivares MD [facility] MAIN BS AMB

## 2021-10-26 ENCOUNTER — PATIENT OUTREACH (OUTPATIENT)
Dept: CASE MANAGEMENT | Age: 81
End: 2021-10-26

## 2021-10-26 NOTE — PROGRESS NOTES
Ambulatory Care Management Note      Date/Time:  10/26/2021 1:25 PM    Top Challenges reviewed with the provider   Challenges to be reviewed by the provider   Additional needs identified to be addressed with provider: yes  · 2 hospital and 4 Ed admits in year  · Last Ed for hypoglycemia-Tresiba decreased to 14 units on 9/28/21  · 9/1/21-BP was 183/141          Ambulatory  contacted patient for discussion and case management of self care  Summary of patients top problems:   1. Diabetes-Daughter says blood sugars are more stable today, 9/30/21-daughter called to say BS was low x2 and she had to caii   2. HTN- History of HTN, BP was up to 183/141 According to chart review. PCP/Specialist follow up:   Future Appointments   Date Time Provider Tawnya Evnas   11/2/2021  3:30 PM Altagracia Hebert MD [facility] MAIN BS AMB   12/1/2021 12:00 PM Altagracia Hebert MD [facility] MAIN BS AMB        Goals Addressed                 This Visit's Progress     Attends follow up appointments on schedule        10/26/21  · Will schedule follow up appt with with PCP  · Will attend follow up appt with PCP   · 11/2/2021 3:30 PM · Altagracia Hebert MD · [address] will follow up in 12-14 days pmk       Patient verbalizes understanding of self -management goals of living with Diabetes. 10/26/21    Complete medication review,  (ie. action, side effects, missed dose, etc.) Review roles of different meds.  Educated on Importance of checking blood sugars, keeping a log, and understanding BS goals   Discussed the importance to prevent, recognize, and manage high and low blood sugars   Will do meal planning and carb counting   Will understand the importance of physical activity at least 30 minutes daily   Will call doctor or nurse if blood sugar is high/low and you don't know why.  ACM will follow up in 7-10 days. Contact information given. Pmk.           Patient verbalized understanding of all information discussed. Patient has this Nurse Navigators contact information for any further questions, concerns, or needs.

## 2021-11-08 DIAGNOSIS — I10 ESSENTIAL HYPERTENSION: ICD-10-CM

## 2021-11-08 RX ORDER — METOPROLOL SUCCINATE 25 MG/1
TABLET, EXTENDED RELEASE ORAL
Qty: 90 TABLET | Refills: 3 | Status: SHIPPED | OUTPATIENT
Start: 2021-11-08 | End: 2022-10-06

## 2021-11-08 RX ORDER — BENZTROPINE MESYLATE 1 MG/1
TABLET ORAL
Qty: 180 TABLET | Refills: 3 | Status: SHIPPED | OUTPATIENT
Start: 2021-11-08

## 2021-11-10 ENCOUNTER — PATIENT OUTREACH (OUTPATIENT)
Dept: CASE MANAGEMENT | Age: 81
End: 2021-11-10

## 2021-11-11 NOTE — PROGRESS NOTES
Goals Addressed                 This Visit's Progress     Attends follow up appointments on schedule   On track     10/26/21  · Will schedule follow up appt with with PCP  · Will attend follow up appt with PCP   · 11/2/2021 3:30 PM · Dorothy Padilla MD · [address] will follow up in 12-14 days pmk      11/11/21   Reviewed up coming appointments, will attend as scheduled:  Ailyn Hu Provider [address]   12/1/2021 12:00 PM  Dorothy Padilla MD [address] [facility]            Patient verbalizes understanding of self -management goals of living with Diabetes. On track     10/26/21    Complete medication review,  (ie. action, side effects, missed dose, etc.) Review roles of different meds.  Educated on Importance of checking blood sugars, keeping a log, and understanding BS goals   Discussed the importance to prevent, recognize, and manage high and low blood sugars   Will do meal planning and carb counting   Will understand the importance of physical activity at least 30 minutes daily   Will call doctor or nurse if blood sugar is high/low and you don't know why.  ACM will follow up in 7-10 days. Contact information given. Pmk.    11/11/21   · Will monitor BS at least TID and keep log  ·  mg/dl  · Will continue to take all medications as ordered   Will all doctor or nurse if blood sugar is high/low and you don't know why.  ACM will follow up in 7-10 days. Contact information given.  Pmk.

## 2021-12-28 ENCOUNTER — OFFICE VISIT (OUTPATIENT)
Dept: INTERNAL MEDICINE CLINIC | Age: 81
End: 2021-12-28
Payer: MEDICARE

## 2021-12-28 VITALS
BODY MASS INDEX: 24.04 KG/M2 | OXYGEN SATURATION: 97 % | SYSTOLIC BLOOD PRESSURE: 130 MMHG | HEART RATE: 64 BPM | WEIGHT: 135.7 LBS | HEIGHT: 63 IN | RESPIRATION RATE: 16 BRPM | DIASTOLIC BLOOD PRESSURE: 71 MMHG | TEMPERATURE: 98.5 F

## 2021-12-28 DIAGNOSIS — F01.50 VASCULAR DEMENTIA WITHOUT BEHAVIORAL DISTURBANCE (HCC): Primary | ICD-10-CM

## 2021-12-28 DIAGNOSIS — E10.649 HYPOGLYCEMIA UNAWARENESS IN TYPE 1 DIABETES MELLITUS (HCC): ICD-10-CM

## 2021-12-28 DIAGNOSIS — E03.2 HYPOTHYROIDISM DUE TO MEDICATION: ICD-10-CM

## 2021-12-28 DIAGNOSIS — I10 PRIMARY HYPERTENSION: ICD-10-CM

## 2021-12-28 DIAGNOSIS — E10.65 HYPERGLYCEMIA DUE TO TYPE 1 DIABETES MELLITUS (HCC): ICD-10-CM

## 2021-12-28 PROCEDURE — G8536 NO DOC ELDER MAL SCRN: HCPCS | Performed by: INTERNAL MEDICINE

## 2021-12-28 PROCEDURE — G8399 PT W/DXA RESULTS DOCUMENT: HCPCS | Performed by: INTERNAL MEDICINE

## 2021-12-28 PROCEDURE — G8510 SCR DEP NEG, NO PLAN REQD: HCPCS | Performed by: INTERNAL MEDICINE

## 2021-12-28 PROCEDURE — 1101F PT FALLS ASSESS-DOCD LE1/YR: CPT | Performed by: INTERNAL MEDICINE

## 2021-12-28 PROCEDURE — 99214 OFFICE O/P EST MOD 30 MIN: CPT | Performed by: INTERNAL MEDICINE

## 2021-12-28 PROCEDURE — 1090F PRES/ABSN URINE INCON ASSESS: CPT | Performed by: INTERNAL MEDICINE

## 2021-12-28 PROCEDURE — G8420 CALC BMI NORM PARAMETERS: HCPCS | Performed by: INTERNAL MEDICINE

## 2021-12-28 PROCEDURE — G8754 DIAS BP LESS 90: HCPCS | Performed by: INTERNAL MEDICINE

## 2021-12-28 PROCEDURE — G8427 DOCREV CUR MEDS BY ELIG CLIN: HCPCS | Performed by: INTERNAL MEDICINE

## 2021-12-28 PROCEDURE — G8752 SYS BP LESS 140: HCPCS | Performed by: INTERNAL MEDICINE

## 2021-12-28 RX ORDER — TRAZODONE HYDROCHLORIDE 50 MG/1
50 TABLET ORAL
Qty: 30 TABLET | Refills: 11 | Status: SHIPPED | OUTPATIENT
Start: 2021-12-28

## 2021-12-28 NOTE — PROGRESS NOTES
Chief Complaint   Patient presents with    Diabetes     routine follow up          1. Have you been to the ER, urgent care clinic since your last visit? Hospitalized since your last visit? Yes When: 9/28/21 Where: [facility] ED  Reason for visit: hyperglycemia    2. Have you seen or consulted any other health care providers outside of the [address] [place] [address] since your last visit? Include any pap smears or colon screening.  No

## 2021-12-29 LAB
ANION GAP SERPL CALC-SCNC: 9 MMOL/L (ref 5–15)
BUN SERPL-MCNC: 21 MG/DL (ref 6–20)
BUN/CREAT SERPL: 24 (ref 12–20)
CALCIUM SERPL-MCNC: 8.6 MG/DL (ref 8.5–10.1)
CHLORIDE SERPL-SCNC: 99 MMOL/L (ref 97–108)
CO2 SERPL-SCNC: 25 MMOL/L (ref 21–32)
CREAT SERPL-MCNC: 0.86 MG/DL (ref 0.55–1.02)
EST. AVERAGE GLUCOSE BLD GHB EST-MCNC: 298 MG/DL
GLUCOSE SERPL-MCNC: 367 MG/DL (ref 65–100)
HBA1C MFR BLD: 12 % (ref 4–5.6)
POTASSIUM SERPL-SCNC: 4.2 MMOL/L (ref 3.5–5.1)
SODIUM SERPL-SCNC: 133 MMOL/L (ref 136–145)
TSH SERPL DL<=0.05 MIU/L-ACNC: 0.63 UIU/ML (ref 0.36–3.74)

## 2021-12-29 NOTE — PROGRESS NOTES
[address] [facility]  [place] [address]  [address] [facility] [address]  Phone:  [phone]  Fax: [phone]       Chief Complaint   Patient presents with    Diabetes     routine follow up    . SUBJECTIVE:    Susana Rachel is a 79 y.o. female comes in for return visit accompanied by her daughter. The daughter is taking care of her completely at this point. She has a mentally challenged son and her other son is helping with his overall care. She reached a point where it is quite difficult for her to take care of the patient because of progressive dementia leading to somewhat aggressive and non-predictable behavior. From a diabetic perspective she did go to the emergency room on several occasions for her hypoglycemia and it was only after I made a slight increase in her insulin that this occurred. What it did affect is her progressive weight loss which is not deemed to be pathological at this point. The dose of reduced in that she is currently taking degludec 14 units daily along with her prandial insulin of 3, 4 and 3 units a.c. breakfast, lunch and dinner respectively. She has a past history of primary hypertension, dyslipidemia and hypothyroidism. Current Outpatient Medications   Medication Sig Dispense Refill    traZODone (DESYREL) 50 mg tablet Take 1 Tablet by mouth nightly. 30 Tablet 11    metoprolol succinate (TOPROL-XL) 25 mg XL tablet TAKE 1 TABLET BY MOUTH EVERY DAY 90 Tablet 3    benztropine (COGENTIN) 1 mg tablet TAKE 1 TABLET BY MOUTH TWICE A  Tablet 3    brimonidine (ALPHAGAN) 0.15 % ophthalmic solution Administer 1 Drop to both eyes two (2) times a day. 15 mL 5    Insulin Needles, Disposable, (Zoey Pen Needle) 32 gauge x 5/32\" ndle Use to inject insulin four times a day.  Dx.e11.9 200 Pen Needle 11    levothyroxine (SYNTHROID) 175 mcg tablet TAKE 1 TABLET BY MOUTH EVERY DAY 90 Tab 3    losartan-hydroCHLOROthiazide (HYZAAR) 100-25 mg per tablet TAKE 1 TABLET BY MOUTH EVERY DAY 90 Tab 3    lidocaine (Lidoderm) 5 % Apply patch to the affected area for 12 hours a day and remove for 12 hours a day. 15 Each 0    haloperidoL (HALDOL) 2 mg tablet Take 2 tablets twice a day 120 Tab 5    amLODIPine (NORVASC) 10 mg tablet Take 1 Tab by mouth daily. 30 Tab 11    clopidogreL (PLAVIX) 75 mg tab TAKE 1 TABLET BY MOUTH EVERY DAY 90 Tab 3    pravastatin (PRAVACHOL) 80 mg tablet TAKE 1 TABLET BY MOUTH EVERY DAY 90 Tab 3    insulin degludec Quanah Sdidharth FlexTouch U-100) 100 unit/mL (3 mL) inpn 16 Units by SubCUTAneous route daily. 2 Adjustable Dose Pre-filled Pen Syringe 11    insulin lispro (HUMALOG) 100 unit/mL kwikpen 3 units before meals breakfast ,4 units before lunch and 3 units before dinner 2 Adjustable Dose Pre-filled Pen Syringe 11    zinc oxide 20 % ointment Apply 1 Applicator to affected area two (2) times a day.  thin layer gluteal cleft       Past Medical History:   Diagnosis Date    [facility] ([facility] [address]     Heart failure ([facility] [address]     unknown to family    Hypertension     Stroke [facility])      Past Surgical History:   Procedure Laterality Date    HX GYN      HX HEENT      thyroidectomy    HX HYSTERECTOMY      [place] REMOVAL GALLBLADDER      [place] THYROIDECTOMY       No Known Allergies      REVIEW OF SYSTEMS:  General: negative for - chills or fever  ENT: negative for - headaches, nasal congestion or tinnitus  Respiratory: negative for - cough, hemoptysis, shortness of breath or wheezing  Cardiovascular : negative for - chest pain, edema, palpitations or shortness of breath  Gastrointestinal: negative for - abdominal pain, blood in stools, heartburn or nausea/vomiting  Genito-Urinary: no dysuria, trouble voiding, or hematuria  Musculoskeletal: negative for - gait disturbance, joint pain, joint stiffness or joint swelling  Neurological: no TIA or stroke symptoms  Hematologic: no bruises, no bleeding, no swollen glands  Integument: no lumps, mole changes, nail changes or rash  Endocrine: no malaise/lethargy or unexpected weight changes      Social History     Socioeconomic History    Marital status:     Number of children: 1   Occupational History    Occupation: retired   Tobacco Use    Smoking status: Never Smoker    Smokeless tobacco: Never Used   Vaping Use    Vaping Use: Never used   Substance and Sexual Activity    Alcohol use: No     Comment: been years    Drug use: No    Sexual activity: Not Currently     Family History   Problem Relation Age of Onset    Diabetes Father     No Known Problems Mother        OBJECTIVE:    Visit Vitals  /71   Pulse 64   Temp 98.5 °F (36.9 °C) (Oral)   Resp 16   Ht 5' 3\" (1.6 m)   Wt 135 lb 11.2 oz (61.6 kg)   SpO2 97%   BMI 24.04 kg/m²     CONSTITUTIONAL: well , well nourished, appears age appropriate  EYES: perrla, eom intact  ENMT:moist mucous membranes, pharynx clear  NECK: supple. Thyroid normal  RESPIRATORY: Chest: clear to ascultation and percussion   CARDIOVASCULAR: Heart: regular rate and rhythm  GASTROINTESTINAL: Abdomen: soft, bowel sounds active  HEMATOLOGIC: no pathological lymph nodes palpated  MUSCULOSKELETAL: Extremities: no edema, pulse 1+   INTEGUMENT: No unusual rashes or suspicious skin lesions noted. Nails appear normal.  NEUROLOGIC: non-focal exam   MENTAL STATUS: alert and oriented, appropriate affect      ASSESSMENT:  1. Vascular dementia without behavioral disturbance ([address]    2. Hyperglycemia due to type 1 diabetes mellitus ([address]    3. 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 Hypothyroidism due to medication    5. Primary hypertension        PLAN:  1. Her dementia is getting progressively worse. There is nothing that can be done. 2. As far as her diabetes is concerned, hemoglobin A1c less than 9 would be adequate. She does have hypoglycemic unawareness which complicates matters.   I will await the results of her hemoglobin A1c and make no adjustments in her insulin for now. 3. She does have a history of hypothyroidism and efficacy and compliance will be assessed. 4. Blood pressure is reasonable today. Orders Placed This Encounter    QUANTIFERON-TB GOLD PLUS ([facility] Default)    HEMOGLOBIN A1C WITH EAG    METABOLIC PANEL, BASIC    TSH 3RD GENERATION    traZODone (DESYREL) 50 mg tablet         Follow-up and Dispositions    · Return in about 4 weeks (around 1/25/2022).            Ananth Krause MD

## 2022-02-15 RX ORDER — INSULIN DEGLUDEC INJECTION 100 U/ML
16 INJECTION, SOLUTION SUBCUTANEOUS DAILY
Qty: 2 ADJUSTABLE DOSE PRE-FILLED PEN SYRINGE | Refills: 11 | Status: SHIPPED | OUTPATIENT
Start: 2022-02-15

## 2022-02-17 ENCOUNTER — PATIENT OUTREACH (OUTPATIENT)
Dept: CASE MANAGEMENT | Age: 82
End: 2022-02-17

## 2022-02-17 NOTE — PROGRESS NOTES
Patient has graduated from the Complex Case Management  program on 02/17/22. Patient/family has the ability to self-manage at this time. Care management goals have been completed. No further Ambulatory Care Manager follow up scheduled. Goals Addressed                 This Visit's Progress     COMPLETED: Attends follow up appointments on schedule   Improving     10/26/21  · Will schedule follow up appt with with PCP  · Will attend follow up appt with PCP   · 11/2/2021 3:30 PM · Rebecca Poon MD · [address] [facility]  will follow up in 12-14 days pmk      11/11/21   Reviewed up coming appointments, will attend as scheduled:  Dakota Taylor Provider [address]   12/1/2021 12:00 PM  Rebecca Poon  [facility]            COMPLETED: Patient verbalizes understanding of self -management goals of living with Diabetes. On track     10/26/21    Complete medication review,  (ie. action, side effects, missed dose, etc.) Review roles of different meds.  Educated on Importance of checking blood sugars, keeping a log, and understanding BS goals   Discussed the importance to prevent, recognize, and manage high and low blood sugars   Will do meal planning and carb counting   Will understand the importance of physical activity at least 30 minutes daily   Will call doctor or nurse if blood sugar is high/low and you don't know why.  ACM will follow up in 7-10 days. Contact information given. Pmk.    11/11/21   · Will monitor BS at least TID and keep log  ·  mg/dl  · Will continue to take all medications as ordered   Will all doctor or nurse if blood sugar is high/low and you don't know why.  ACM will follow up in 7-10 days. Contact information given. Pmk. Patient has Ambulatory Care Manager's contact information for any further questions, concerns, or needs.   Patients upcoming visits:    Future Appointments   Date Time Provider Tawnya Evans   3/28/2022  4:15 PM Rachna Alanis MD [facility] MAIN BS AMB

## 2022-02-28 NOTE — ED PROVIDER NOTES
EMERGENCY DEPARTMENT HISTORY AND PHYSICAL EXAM      Date: 5/23/2020  Patient Name: Curry Ag  Patient Age and Sex: 67 y.o. female     History of Presenting Illness     Chief Complaint   Patient presents with    High Blood Sugar       History Provided By: Patient, ems    HPI: Curry Ag is a 67-year-old female with a history of type 1 diabetes presenting with hyperglycemia. According to EMS, earlier this afternoon, son found patient a little lethargic and thought that she was hypoglycemic. An ambulance arrived and gave her something to help with her sugars. They left around 1:22 PM.  Then patient started to become more short of breath and when he measured the glucose at home it was read as high. About 15 minutes later, they called a new ambulance. EMS reports that when they arrived to the house, patient's glucose was reading as high and she was tachypneic but alert. They did not give her anything in route. Patient here states that she is not having any shortness of breath, nausea, vomiting, chest pain, abdominal pain, dysuria, hematuria, fevers, cough, diarrhea. States that she feels fine. There are no other complaints, changes, or physical findings at this time. PCP: Shankar Dominguez MD    No current facility-administered medications on file prior to encounter. Current Outpatient Medications on File Prior to Encounter   Medication Sig Dispense Refill    metoprolol succinate (TOPROL-XL) 25 mg XL tablet TAKE 1 TABLET BY MOUTH EVERY DAY 90 Tab 3    insulin degludec [place] Car FlexTouch U-100) 100 unit/mL (3 mL) inpn 18 Units by SubCUTAneous route daily.  2 Adjustable Dose Pre-filled Pen Syringe 11    pravastatin (PRAVACHOL) 80 mg tablet TAKE 1 TABLET BY MOUTH at bedtime 90 Tab 3    levothyroxine (SYNTHROID) 175 mcg tablet TAKE 1 TABLET BY MOUTH EVERY DAY 90 Tab 3    clopidogreL (PLAVIX) 75 mg tab TAKE 1 TABLET BY MOUTH EVERY DAY 90 Tab 3    amLODIPine (NORVASC) 10 mg tablet TAKE 1 TABLET Routing refill request to provider for review/approval because:  Drug not on the [facility] refill protocol     Bibi Medina RN on 2/28/2022 at 7:29 AM         BY MOUTH EVERY DAY IN THE MORNING 90 Tab 3    flash glucose scanning reader (FREESTYLE KEVON 14 DAY READER) [facility] [place] directed[place] 1 Each 5    flash glucose sensor (FREESTYLE KEVON 14 DAY SENSOR) kit Continuous monitoring 2 Kit 11    glucose blood VI test strips (ONETOUCH ULTRA BLUE TEST STRIP) strip USE TO CHECK BLOOD SUGARS DAILY. Dx: E11.649 100 Strip 11    Blood-Glucose Meter (ONETOUCH ULTRA2 METER) misc USE TO CHECK BLOOD SUGARS DAILY. 1 Each 0    insulin aspart U-100 (NOVOLOG) 100 unit/mL (3 mL) inpn 4 units AC breakfast,lunch, and dinner (Patient taking differently: 4 Units by SubCUTAneous route Before breakfast, lunch, and dinner. 4 units AC breakfast,lunch, and dinner) 1 Adjustable Dose Pre-filled Pen Syringe 11    brimonidine (ALPHAGAN) 0.15 % ophthalmic solution Administer 1 Drop to both eyes two (2) times a day. Past History     Past Medical History:  Past Medical History:   Diagnosis Date    Heart failure ([facility] [address]     unknown to family    Hypertension     Stroke [facility])        Past Surgical History:  Past Surgical History:   Procedure Laterality Date    HX GYN      HX HEENT      thyroidectomy    HX HYSTERECTOMY      REMOVAL GALLBLADDER      THYROIDECTOMY         Family History:  Family History   Problem Relation Age of Onset    Diabetes Father     No Known Problems Mother        Social History:  Social History     Tobacco Use    Smoking status: Never Smoker    Smokeless tobacco: Never Used   Substance Use Topics    Alcohol use: No     Comment: been years    Drug use: No       Allergies:  No Known Allergies      Review of Systems   Review of Systems   Constitutional: Positive for fatigue. Negative for chills and fever. Respiratory: Positive for shortness of breath (per son). Negative for cough. Cardiovascular: Negative for chest pain. Gastrointestinal: Negative for abdominal pain, constipation, diarrhea, nausea and vomiting.    Genitourinary: Negative for dysuria, frequency and hematuria. Neurological: Negative for weakness and numbness. All other systems reviewed and are negative. Physical Exam   Physical Exam  Vitals signs and nursing note reviewed. Constitutional:       Appearance: She is well-developed. HENT:      Head: Normocephalic and atraumatic. Nose: Nose normal.      Mouth/Throat:      Mouth: Mucous membranes are dry. Comments: Very dry mucous membranes including her tongue and her lips. Cracked lips. Eyes:      Conjunctiva/sclera: Conjunctivae normal.   Neck:      Musculoskeletal: Normal range of motion and neck supple. Cardiovascular:      Rate and Rhythm: Normal rate and regular rhythm. Pulmonary:      Effort: Respiratory distress present. Breath sounds: Normal breath sounds. Comments: Rowan Eva like breathing. Abdominal:      General: There is no distension. Palpations: Abdomen is soft. Tenderness: There is no abdominal tenderness. Musculoskeletal: Normal range of motion. Skin:     General: Skin is warm and dry. Neurological:      General: No focal deficit present. Mental Status: She is alert and oriented to person, place, and time. Mental status is at baseline.    Psychiatric:         Mood and Affect: Mood normal.          Diagnostic Study Results     Labs -     Recent Results (from the past 12 hour(s))   GLUCOSE, POC    Collection Time: 05/23/20  4:20 PM   Result Value Ref Range    Glucose (POC) >600 (HH) 65 - 100 mg/dL    Performed by Katerina CANTRELL (traveler)    GLUCOSE, POC    Collection Time: 05/23/20  4:23 PM   Result Value Ref Range    Glucose (POC) >600 (HH) 65 - 100 mg/dL    Performed by Katerina CANTRELL (traveler)    POC EG7    Collection Time: 05/23/20  4:41 PM   Result Value Ref Range    Calcium, ionized (POC) 1.14 1.12 - 1.32 mmol/L    pH (POC) 6.933 (LL) 7.35 - 7.45      pCO2 (POC) <15.0 (L) 35.0 - 45.0 MMHG    pO2 (POC) 93 80 - 100 MMHG    Site OTHER      Device: ROOM AIR      Allens test (POC) N/A      Specimen type (POC) VENOUS BLOOD      Total resp. rate 22     CBC WITH AUTOMATED DIFF    Collection Time: 05/23/20  4:42 PM   Result Value Ref Range    WBC 11.8 (H) 3.6 - 11.0 K/uL    RBC 3.45 (L) 3.80 - 5.20 M/uL    HGB 11.0 (L) 11.5 - 16.0 g/dL    HCT 36.4 35.0 - 47.0 %    .5 (H) 80.0 - 99.0 FL    MCH 31.9 26.0 - 34.0 PG    MCHC 30.2 30.0 - 36.5 g/dL    RDW 14.9 (H) 11.5 - 14.5 %    PLATELET 399 566 - 292 K/uL    MPV 11.3 8.9 - 12.9 FL    NRBC 0.0 0  WBC    ABSOLUTE NRBC 0.00 0.00 - 0.01 K/uL    NEUTROPHILS 80 (H) 32 - 75 %    LYMPHOCYTES 10 (L) 12 - 49 %    MONOCYTES 5 5 - 13 %    EOSINOPHILS 0 0 - 7 %    BASOPHILS 1 0 - 1 %    MYELOCYTES 4 %    IMMATURE GRANULOCYTES 0 0.0 - 0.5 %    ABS. NEUTROPHILS 9.4 (H) 1.8 - 8.0 K/UL    ABS. LYMPHOCYTES 1.2 0.8 - 3.5 K/UL    ABS. MONOCYTES 0.6 0.0 - 1.0 K/UL    ABS. EOSINOPHILS 0.0 0.0 - 0.4 K/UL    ABS. BASOPHILS 0.1 0.0 - 0.1 K/UL    ABS. IMM. GRANS. 0.0 0.00 - 0.04 K/UL    DF MANUAL      PLATELET COMMENTS LARGE PLATELETS      RBC COMMENTS MACROCYTOSIS      WBC COMMENTS LEFT SHIFT     METABOLIC PANEL, COMPREHENSIVE    Collection Time: 05/23/20  4:42 PM   Result Value Ref Range    Sodium 126 (L) 136 - 145 mmol/L    Potassium 6.9 (HH) 3.5 - 5.1 mmol/L    Chloride 91 (L) 97 - 108 mmol/L    CO2 <5 (LL) 21 - 32 mmol/L    Anion gap Cannot be calculated 5 - 15 mmol/L    Glucose 1,062 (HH) 65 - 100 mg/dL    BUN 37 (H) 6 - 20 MG/DL    Creatinine 1.77 (H) 0.55 - 1.02 MG/DL    BUN/Creatinine ratio 21 (H) 12 - 20      GFR est AA 34 (L) >60 ml/min/1.73m2    GFR est non-AA 28 (L) >60 ml/min/1.73m2    Calcium 8.8 8.5 - 10.1 MG/DL    Bilirubin, total 0.5 0.2 - 1.0 MG/DL    ALT (SGPT) 53 12 - 78 U/L    AST (SGOT) 64 (H) 15 - 37 U/L    Alk.  phosphatase 185 (H) 45 - 117 U/L    Protein, total 6.5 6.4 - 8.2 g/dL    Albumin 3.5 3.5 - 5.0 g/dL    Globulin 3.0 2.0 - 4.0 g/dL    A-G Ratio 1.2 1.1 - 2.2     GLUCOSE, POC    Collection Time: 05/23/20  5:48 PM Result Value Ref Range    Glucose (POC) >600 (HH) 65 - 100 mg/dL    Performed by Enrico CANTRELL (traveler)    GLUCOSE, POC    Collection Time: 05/23/20  5:51 PM   Result Value Ref Range    Glucose (POC) >600 (HH) 65 - 100 mg/dL    Performed by Enrico CANTRELL (traveler)    EKG, 12 LEAD, INITIAL    Collection Time: 05/23/20  6:01 PM   Result Value Ref Range    Ventricular Rate 75 BPM    Atrial Rate 75 BPM    P-R Interval 172 ms    QRS Duration 86 ms    Q-T Interval 398 ms    QTC Calculation (Bezet) 444 ms    Calculated P Axis 38 degrees    Calculated R Axis -44 degrees    Calculated T Axis -4 degrees    Diagnosis       Sinus rhythm with premature atrial complexes  Possible Left atrial enlargement  Left axis deviation  Septal infarct , age undetermined  ST & T wave abnormality, consider anterior ischemia  When compared with ECG of 23-APR-2020 01:52,  Significant changes have occurred     URINALYSIS W/ REFLEX CULTURE    Collection Time: 05/23/20  6:18 PM   Result Value Ref Range    Color YELLOW/STRAW      Appearance TURBID (A) CLEAR      Specific gravity 1.025 1.003 - 1.030      pH (UA) 5.0 5.0 - 8.0      Protein 30 (A) NEG mg/dL    Glucose >1,000 (A) NEG mg/dL    Ketone 15 (A) NEG mg/dL    Bilirubin Negative NEG      Blood Negative NEG      Urobilinogen 0.2 0.2 - 1.0 EU/dL    Nitrites Negative NEG      Leukocyte Esterase Negative NEG      WBC 0-4 0 - 4 /hpf    RBC 0-5 0 - 5 /hpf    Epithelial cells FEW FEW /lpf    Bacteria Negative NEG /hpf    UA:UC IF INDICATED CULTURE NOT INDICATED BY UA RESULT CNI      Hyaline cast 2-5 0 - 5 /lpf   METABOLIC PANEL, BASIC    Collection Time: 05/23/20  6:19 PM   Result Value Ref Range    Sodium 132 (L) 136 - 145 mmol/L    Potassium 4.9 3.5 - 5.1 mmol/L    Chloride 98 97 - 108 mmol/L    CO2 <5 (LL) 21 - 32 mmol/L    Anion gap Cannot be calculated 5 - 15 mmol/L    Glucose 841 (HH) 65 - 100 mg/dL    BUN 36 (H) 6 - 20 MG/DL    Creatinine 1.75 (H) 0.55 - 1.02 MG/DL BUN/Creatinine ratio 21 (H) 12 - 20      GFR est AA 34 (L) >60 ml/min/1.73m2    GFR est non-AA 28 (L) >60 ml/min/1.73m2    Calcium 9.4 8.5 - 10.1 MG/DL   MAGNESIUM    Collection Time: 05/23/20  6:19 PM   Result Value Ref Range    Magnesium 2.5 (H) 1.6 - 2.4 mg/dL   PHOSPHORUS    Collection Time: 05/23/20  6:19 PM   Result Value Ref Range    Phosphorus 8.7 (H) 2.6 - 4.7 MG/DL   GLUCOSE, POC    Collection Time: 05/23/20  6:54 PM   Result Value Ref Range    Glucose (POC) >600 (HH) 65 - 100 mg/dL    Performed by Cheryle Sang BSN (traveler)    GLUCOSE, POC    Collection Time: 05/23/20  6:55 PM   Result Value Ref Range    Glucose (POC) >600 (HH) 65 - 100 mg/dL    Performed by Cheryle Sang BSN (traveler)    GLUCOSE, POC    Collection Time: 05/23/20  8:00 PM   Result Value Ref Range    Glucose (POC) >600 (HH) 65 - 100 mg/dL    Performed by Conception Pier    GLUCOSE, POC    Collection Time: 05/23/20  9:05 PM   Result Value Ref Range    Glucose (POC) >600 (HH) 65 - 100 mg/dL    Performed by [facility]        Radiologic Studies -   No orders to display     CT Results  (Last 48 hours)    None        CXR Results  (Last 48 hours)    None            Medical Decision Making   I am the first provider for this patient. I reviewed the vital signs, available nursing notes, past medical history, past surgical history, family history and social history. Vital Signs-Reviewed the patient's vital signs.   Patient Vitals for the past 12 hrs:   Temp Pulse Resp BP SpO2   05/23/20 2110 97.4 °F (36.3 °C) 72 25 (!) 112/39 100 %   05/23/20 2030  72 25 (!) 108/39 100 %   05/23/20 2015  72 22 (!) 111/39 100 %   05/23/20 2000  70 27 (!) 109/37 100 %   05/23/20 1930  69 (!) 31 97/40 100 %   05/23/20 1915  74 27 (!) 112/39 100 %   05/23/20 1900  75 27 (!) 109/39 100 %   05/23/20 1845  78 29 (!) 117/38 100 %   05/23/20 1830  78 (!) 31 (!) 116/33 100 %   05/23/20 1815  74 30  100 %   05/23/20 1805     100 %   05/23/20 1800  77 26  100 %   05/23/20 1745  77 27 (!) 108/35 100 %   05/23/20 1730  84 26  100 %   05/23/20 1715  81 25  100 %   05/23/20 1700  84 28 (!) 109/35 100 %   05/23/20 1645  88 29 (!) 111/35 100 %   05/23/20 1630  81 26 (!) 115/33 100 %   05/23/20 1615 98.4 °F (36.9 °C) 90 (!) 33 (!) 113/32 96 %       Records Reviewed: Nursing Notes and Old Medical Records    Provider Notes (Medical Decision Making):   Patient with a history of type 1 diabetes presenting now with worsening shortness of breath after was found hypoglycemic received what I can only assume is D50 and now is hyperglycemic. Glucose read as high on our monitors and patient appears to be Kussmaul breathing with respiratory rates in the 30s. Will get labs to rule out DKA, give her insulin and fluids. ED Course:   Initial assessment performed. The patients presenting problems have been discussed, and they are in agreement with the care plan formulated and outlined with them. I have encouraged them to ask questions as they arise throughout their visit. ED Course as of May 23 2132   Sat May 23, 2020   1658 Called by respiratory therapy that patient's bicarb is less than 15 and her pH is 6.9. All consistent with DKA. Patient's continues to be alert though tachypneic and Kussmaul breathing. Advised nurse to give her 50 mEq of bicarb and starting her on an insulin drip. [JS]   0616 Spoke with hospitalist, Dr. Dale Deal who states that patient's potassium was 6.9 likely attributed to her DKA. Agrees with giving her calcium and would also recommend albuterol. No indication to get nephrology on board at this time. [JS]   0150 EKG interpreted by me. Shows normal sinus rhythm with a heart rate of 75. No ST elevations or depressions concerning for ischemia. No peak T waves.     [JS]      ED Course User Index  [JS] Nikhil Solorio MD     Critical Care Time:   CRITICAL CARE NOTE :    4:27 PM    IMPENDING DETERIORATION -Airway, Respiratory, Cardiovascular and Metabolic  ASSOCIATED RISK FACTORS - Hypotension, Shock and Metabolic changes  MANAGEMENT- Bedside Assessment and Supervision of Care  INTERPRETATION -  Xrays, Blood Gases, ECG and Blood Pressure  INTERVENTIONS - vent mngmt and Metobolic interventions  CASE REVIEW - Hospitalist and Nursing  TREATMENT RESPONSE -Stable  PERFORMED BY - Self    NOTES   :    I have spent 40 minutes of critical care time involved in lab review, consultations with specialist, family decision- making, bedside attention and documentation. During this entire length of time I was immediately available to the patient . Disposition:    Admission Note:  Patient is being admitted to the hospital by Dr. Frankey Melia, Service: Hospitalist.  The results of their tests and reasons for their admission have been discussed with them and available family. They convey agreement and understanding for the need to be admitted and for their admission diagnosis. Diagnosis     Clinical Impression:   1. Type 1 diabetes mellitus with ketoacidosis without coma ([facility] [address]    2. Acute hyperkalemia        Attestations:  Kristi Ratliff M.D. Please note that this dictation was completed with [employer], the computer voice recognition software. Quite often unanticipated grammatical, syntax, homophones, and other interpretive errors are inadvertently transcribed by the computer software. Please disregard these errors. Please excuse any errors that have escaped final proofreading. Thank you.

## 2022-03-01 DIAGNOSIS — E78.5 DYSLIPIDEMIA: ICD-10-CM

## 2022-03-01 RX ORDER — PRAVASTATIN SODIUM 80 MG/1
TABLET ORAL
Qty: 90 TABLET | Refills: 3 | Status: SHIPPED | OUTPATIENT
Start: 2022-03-01

## 2022-03-18 DIAGNOSIS — I10 ESSENTIAL HYPERTENSION: ICD-10-CM

## 2022-03-18 PROBLEM — E16.2 HYPOGLYCEMIA: Status: ACTIVE | Noted: 2020-06-21

## 2022-03-18 PROBLEM — D64.9 ANEMIA: Status: ACTIVE | Noted: 2019-12-04

## 2022-03-18 PROBLEM — Z86.73 H/O: CVA (CEREBROVASCULAR ACCIDENT): Status: ACTIVE | Noted: 2018-11-06

## 2022-03-19 PROBLEM — G93.41 ACUTE METABOLIC ENCEPHALOPATHY: Status: ACTIVE | Noted: 2019-11-19

## 2022-03-19 PROBLEM — R79.89 PRERENAL AZOTEMIA: Status: ACTIVE | Noted: 2020-11-22

## 2022-03-19 PROBLEM — I87.2 VENOUS INSUFFICIENCY: Status: ACTIVE | Noted: 2020-01-16

## 2022-03-19 RX ORDER — CLOPIDOGREL BISULFATE 75 MG/1
75 TABLET ORAL DAILY
Qty: 90 TABLET | Refills: 3 | Status: SHIPPED | OUTPATIENT
Start: 2022-03-19

## 2022-03-20 PROBLEM — E86.0 DEHYDRATION: Status: ACTIVE | Noted: 2020-09-16

## 2022-03-20 PROBLEM — F01.50 VASCULAR DEMENTIA WITHOUT BEHAVIORAL DISTURBANCE (HCC): Status: ACTIVE | Noted: 2018-10-20

## 2022-03-20 PROBLEM — E10.65 HYPERGLYCEMIA DUE TO TYPE 1 DIABETES MELLITUS (HCC): Status: ACTIVE | Noted: 2020-02-27

## 2022-03-20 PROBLEM — E10.649 HYPOGLYCEMIA UNAWARENESS IN TYPE 1 DIABETES MELLITUS (HCC): Status: ACTIVE | Noted: 2020-08-31

## 2022-03-26 NOTE — ED NOTES
Bedside and Verbal shift change report given to Bethany (oncoming nurse) by Shabana Donahue (offgoing nurse).  Report included the following information SBAR, Kardex, ED Summary, Intake/Output, MAR, Med Rec Status and Cardiac Rhythm SR. ,

## 2022-04-15 DIAGNOSIS — E03.2 HYPOTHYROIDISM DUE TO MEDICATION: ICD-10-CM

## 2022-04-15 RX ORDER — LOSARTAN POTASSIUM AND HYDROCHLOROTHIAZIDE 25; 100 MG/1; MG/1
TABLET ORAL
Qty: 90 TABLET | Refills: 3 | Status: SHIPPED | OUTPATIENT
Start: 2022-04-15

## 2022-04-15 RX ORDER — LEVOTHYROXINE SODIUM 175 UG/1
TABLET ORAL
Qty: 90 TABLET | Refills: 3 | Status: SHIPPED | OUTPATIENT
Start: 2022-04-15

## 2022-06-03 RX ORDER — PEN NEEDLE, DIABETIC 31 GX3/16"
NEEDLE, DISPOSABLE MISCELLANEOUS
Qty: 200 PEN NEEDLE | Refills: 11 | Status: SHIPPED | OUTPATIENT
Start: 2022-06-03

## 2022-11-02 ENCOUNTER — OFFICE VISIT (OUTPATIENT)
Dept: INTERNAL MEDICINE CLINIC | Age: 82
End: 2022-11-02
Payer: MEDICARE

## 2022-11-02 VITALS
HEIGHT: 63 IN | RESPIRATION RATE: 16 BRPM | SYSTOLIC BLOOD PRESSURE: 100 MMHG | OXYGEN SATURATION: 99 % | WEIGHT: 140.2 LBS | DIASTOLIC BLOOD PRESSURE: 65 MMHG | BODY MASS INDEX: 24.84 KG/M2 | TEMPERATURE: 98.5 F | HEART RATE: 63 BPM

## 2022-11-02 DIAGNOSIS — E10.65 HYPERGLYCEMIA DUE TO TYPE 1 DIABETES MELLITUS (HCC): ICD-10-CM

## 2022-11-02 DIAGNOSIS — E10.649 HYPOGLYCEMIA UNAWARENESS IN TYPE 1 DIABETES MELLITUS (HCC): ICD-10-CM

## 2022-11-02 DIAGNOSIS — F01.50 VASCULAR DEMENTIA WITHOUT BEHAVIORAL DISTURBANCE (HCC): Primary | ICD-10-CM

## 2022-11-02 DIAGNOSIS — R79.89 PRERENAL AZOTEMIA: ICD-10-CM

## 2022-11-02 DIAGNOSIS — Z13.31 SCREENING FOR DEPRESSION: ICD-10-CM

## 2022-11-02 DIAGNOSIS — E78.5 DYSLIPIDEMIA: ICD-10-CM

## 2022-11-02 DIAGNOSIS — Z00.00 WELLNESS EXAMINATION: ICD-10-CM

## 2022-11-02 DIAGNOSIS — E03.2 HYPOTHYROIDISM DUE TO MEDICATION: ICD-10-CM

## 2022-11-02 DIAGNOSIS — I10 PRIMARY HYPERTENSION: ICD-10-CM

## 2022-11-02 PROCEDURE — 3046F HEMOGLOBIN A1C LEVEL >9.0%: CPT | Performed by: INTERNAL MEDICINE

## 2022-11-02 PROCEDURE — G8536 NO DOC ELDER MAL SCRN: HCPCS | Performed by: INTERNAL MEDICINE

## 2022-11-02 PROCEDURE — G8420 CALC BMI NORM PARAMETERS: HCPCS | Performed by: INTERNAL MEDICINE

## 2022-11-02 PROCEDURE — G8399 PT W/DXA RESULTS DOCUMENT: HCPCS | Performed by: INTERNAL MEDICINE

## 2022-11-02 PROCEDURE — 1090F PRES/ABSN URINE INCON ASSESS: CPT | Performed by: INTERNAL MEDICINE

## 2022-11-02 PROCEDURE — G8754 DIAS BP LESS 90: HCPCS | Performed by: INTERNAL MEDICINE

## 2022-11-02 PROCEDURE — G0439 PPPS, SUBSEQ VISIT: HCPCS | Performed by: INTERNAL MEDICINE

## 2022-11-02 PROCEDURE — G8752 SYS BP LESS 140: HCPCS | Performed by: INTERNAL MEDICINE

## 2022-11-02 PROCEDURE — 1123F ACP DISCUSS/DSCN MKR DOCD: CPT | Performed by: INTERNAL MEDICINE

## 2022-11-02 PROCEDURE — G8510 SCR DEP NEG, NO PLAN REQD: HCPCS | Performed by: INTERNAL MEDICINE

## 2022-11-02 PROCEDURE — 3078F DIAST BP <80 MM HG: CPT | Performed by: INTERNAL MEDICINE

## 2022-11-02 PROCEDURE — 1101F PT FALLS ASSESS-DOCD LE1/YR: CPT | Performed by: INTERNAL MEDICINE

## 2022-11-02 PROCEDURE — G8427 DOCREV CUR MEDS BY ELIG CLIN: HCPCS | Performed by: INTERNAL MEDICINE

## 2022-11-02 PROCEDURE — 99214 OFFICE O/P EST MOD 30 MIN: CPT | Performed by: INTERNAL MEDICINE

## 2022-11-02 PROCEDURE — 3074F SYST BP LT 130 MM HG: CPT | Performed by: INTERNAL MEDICINE

## 2022-11-02 NOTE — PROGRESS NOTES
Alfredo Barney is a [de-identified] y.o. female  Chief Complaint   Patient presents with    Annual Wellness Visit     Patient here for Annual Wellness exam.      1. Have you been to the ER, urgent care clinic since your last visit? Hospitalized since your last visit? No    2. Have you seen or consulted any other health care providers outside of the [address] [place] [address] since your last visit? Include any pap smears or colon screening.  No

## 2022-11-02 NOTE — PROGRESS NOTES
[address] [place] [address] and Primary Care  [place] [address]  [address]  [place] [address]  Phone:  [phone]  Fax: [phone]       Chief Complaint   Patient presents with    Annual Wellness Visit     Patient here for Annual Wellness exam.    .      SUBJECTIVE:    Miles Esteban is a [de-identified] y.o. female comes in for followup. This is the first time I am seeing her in the last year. Her son accompanies her. She has progressive dementia which is gradually getting worse. As far as her diabetes is concerned, she is taking insulin once a day. Blood sugars have been reasonable, although she could not give me any meaningful sugars. 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 She has a past history of hypothyroidism, dyslipidemia and hypertension. Current Outpatient Medications   Medication Sig Dispense Refill    metoprolol succinate (TOPROL-XL) 25 mg XL tablet TAKE 1 TABLET BY MOUTH EVERY DAY 90 Tablet 3    BD Zoey 2nd Gen Pen Needle 32 gauge x 5/32\" ndle USE TO INJECT INSULIN FOUR TIMES A DAY. DX.E11.9 100 Pen Needle 11    Insulin Needles, Disposable, (Zoey Pen Needle) 32 gauge x 5/32\" ndle Use to inject insulin four times a day. Dx.e11.9 200 Pen Needle 11    losartan-hydroCHLOROthiazide (HYZAAR) 100-25 mg per tablet TAKE 1 TABLET BY MOUTH EVERY DAY 90 Tablet 3    levothyroxine (SYNTHROID) 175 mcg tablet TAKE 1 TABLET BY MOUTH EVERY DAY 90 Tablet 3    clopidogreL (PLAVIX) 75 mg tab Take 1 Tablet by mouth daily.  90 Tablet 3    amLODIPine (NORVASC) 10 mg tablet TAKE 1 TABLET BY MOUTH EVERY DAY 90 Tablet 3    pravastatin (PRAVACHOL) 80 mg tablet TAKE 1 TABLET BY MOUTH EVERY DAY 90 Tablet 3    NovoLOG Flexpen U-100 Insulin 100 unit/mL (3 mL) inpn INJECT 3 UNITS SUBCUTANEOUSLY BEFORE BREAKFAST AND LUNCH AND 2 UNITS BEFORE DINNER 1 Adjustable Dose Pre-filled Pen Syringe 11    insulin degludec Chad Gil FlexTouch U-100) 100 unit/mL (3 mL) inpn 16 Units by SubCUTAneous route daily. 2 Adjustable Dose Pre-filled Pen Syringe 11    traZODone (DESYREL) 50 mg tablet Take 1 Tablet by mouth nightly. 30 Tablet 11    benztropine (COGENTIN) 1 mg tablet TAKE 1 TABLET BY MOUTH TWICE A  Tablet 3    brimonidine (ALPHAGAN) 0.15 % ophthalmic solution Administer 1 Drop to both eyes two (2) times a day. 15 mL 5    lidocaine (Lidoderm) 5 % Apply patch to the affected area for 12 hours a day and remove for 12 hours a day. 15 Each 0    haloperidoL (HALDOL) 2 mg tablet Take 2 tablets twice a day 120 Tab 5    zinc oxide 20 % ointment Apply 1 Applicator to affected area two (2) times a day.  thin layer gluteal cleft       Past Medical History:   Diagnosis Date    Diabetes ([facility]     Heart failure ([facility]     unknown to family    Hypertension     Stroke [facility])      Past Surgical History:   Procedure Laterality Date    HX GYN      HX HEENT      thyroidectomy    HX HYSTERECTOMY      DC REMOVAL GALLBLADDER      DC THYROIDECTOMY       No Known Allergies      REVIEW OF SYSTEMS:  General: negative for - chills or fever  ENT: negative for - headaches, nasal congestion or tinnitus  Respiratory: negative for - cough, hemoptysis, shortness of breath or wheezing  Cardiovascular : negative for - chest pain, edema, palpitations or shortness of breath  Gastrointestinal: negative for - abdominal pain, blood in stools, heartburn or nausea/vomiting  Genito-Urinary: no dysuria, trouble voiding, or hematuria  Musculoskeletal: negative for - gait disturbance, joint pain, joint stiffness or joint swelling  Neurological: no TIA or stroke symptoms  Hematologic: no bruises, no bleeding, no swollen glands  Integument: no lumps, mole changes, nail changes or rash  Endocrine: no malaise/lethargy or unexpected weight changes      Social History     Socioeconomic History    Marital status:     Number of children: 1   Occupational History Occupation: retired   Tobacco Use    Smoking status: Never    Smokeless tobacco: Never   Vaping Use    Vaping Use: Never used   Substance and Sexual Activity    Alcohol use: No     Comment: been years    Drug use: No    Sexual activity: Not Currently     Family History   Problem Relation Age of Onset    Diabetes Father     No Known Problems Mother        OBJECTIVE:    Visit Vitals  /65   Pulse 63   Temp 98.5 °F (36.9 °C) (Oral)   Resp 16   Ht 5' 3\" (1.6 m)   Wt 140 lb 3.2 oz (63.6 kg)   SpO2 99%   BMI 24.84 kg/m²     CONSTITUTIONAL: well , well nourished, appears age appropriate  EYES: perrla, eom intact  ENMT:moist mucous membranes, pharynx clear  NECK: supple. Thyroid normal  RESPIRATORY: Chest: clear to ascultation and percussion   CARDIOVASCULAR: Heart: regular rate and rhythm  GASTROINTESTINAL: Abdomen: soft, bowel sounds active  HEMATOLOGIC: no pathological lymph nodes palpated  MUSCULOSKELETAL: Extremities: no edema, pulse 1+   INTEGUMENT: No unusual rashes or suspicious skin lesions noted. Nails appear normal.  NEUROLOGIC: non-focal exam   MENTAL STATUS: alert and oriented, appropriate affect      ASSESSMENT:  1. Vascular dementia without behavioral disturbance ([address]    2. Dyslipidemia    3. Primary hypertension    4. Hypothyroidism due to medication    5. Hyperglycemia due to type 1 diabetes mellitus ([address]    6. 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 Prerenal azotemia        PLAN:  1. The patient's dementia is gradually getting worse. 2. She will continue her statin as prescribed. 3. Blood pressure is excellent today. 4. She does have a history of hypothyroidism and efficacy and compliance will be assessed. 5. She has type 1 diabetes presumably. This is complicated by hypoglycemic unawareness. All things considered, she is doing remarkably well because she has not been hospitalized. I do not think her diabetes is well controlled obviously.   Given her age and her existing comorbidities hemoglobin A1c of between 8.5 and 9 would be adequate. 6. Finally, she does have a mild pre-renal azotemia and this would be assessed. Orders Placed This Encounter    HEMOGLOBIN A1C WITH EAG    APOLIPOPROTEIN B    CBC WITH AUTOMATED DIFF    LIPID PANEL    METABOLIC PANEL, COMPREHENSIVE    TSH 3RD GENERATION               Malu Pitts MD   This is the Subsequent Medicare Annual Wellness Exam, performed 12 months or more after the Initial AWV or the last Subsequent AWV    I have reviewed the patient's medical history in detail and updated the computerized patient record. Assessment/Plan   Education and counseling provided:  Are appropriate based on today's review and evaluation    1. [facility] without [facility] disturbance [facility]  2. Dyslipidemia  -     APOLIPOPROTEIN B; Future  -     LIPID PANEL; Future  3. Primary hypertension  -     CBC WITH AUTOMATED DIFF; Future  -     COLLECTION VENOUS BLOOD,VENIPUNCTURE  -     METABOLIC PANEL, COMPREHENSIVE; Future  4. Hypothyroidism due to medication  -     TSH 3RD GENERATION; Future  5. Hyperglycemia due to type 1 diabetes mellitus ([facility]  -     HEMOGLOBIN A1C WITH EAG; Future  6. 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  Prerenal azotemia       Depression Risk Factor Screening     3 most recent PHQ Screens 11/2/2022   Little interest or pleasure in doing things Not at all   Feeling down, depressed, irritable, or hopeless Not at all   Total Score PHQ 2 0   Trouble falling or staying asleep, or sleeping too much Not at all   Feeling tired or having little energy Not at all   Poor appetite, weight loss, or overeating Not at all   Feeling bad about yourself - or that you are a failure or have let yourself or your family down Not at all   Trouble concentrating on things such as school, work, reading, or watching TV Not at all   Moving or speaking so slowly that other people could have noticed; or the opposite being so fidgety that others notice Not at all   Thoughts of being better off dead, or hurting yourself in some way Not at all   PHQ 9 Score 0   How difficult have these problems made it for you to do your work, take care of your home and get along with others Not difficult at all       Alcohol & Drug Abuse Risk Screen    Do you average more than 1 drink per night or more than 7 drinks a week:  No    On any one occasion in the past three months have you have had more than 3 drinks containing alcohol:  No          Functional Ability and Level of Safety    Hearing: Hearing is good. Activities of Daily Living: The home contains: no safety equipment. Patient needs help with:  phone, transportation, shopping, preparing meals, laundry, housework, managing medications, and managing money      Ambulation: with no difficulty     Fall Risk:  Fall Risk Assessment, last 12 mths 11/2/2022   Able to walk? Yes   Fall in past 12 months? 0   Do you feel unsteady? 0   Are you worried about falling 0      Abuse Screen:  Patient is not abused       Cognitive Screening    Has your family/caregiver stated any concerns about your memory: no     Cognitive Screening: pt has severe dementia.     Health Maintenance Due     Health Maintenance Due   Topic Date Due    Shingrix Vaccine Age 49> (1 of 2) Never done    Eye Exam Retinal or Dilated  05/08/2017    Foot Exam Q1  05/28/2020    MICROALBUMIN Q1  10/15/2020    COVID-19 Vaccine (2 - Moderna series) 08/20/2021    Flu Vaccine (1) Never done       Patient Care Team   Patient Care Team:  April Lagos MD as PCP - General (Internal Medicine Physician)  April Lagos MD as PCP [facility] Empaneled Provider    History     Patient Active Problem List   Diagnosis Code    Dyslipidemia E78.5    Hypertension I10    Hypothyroidism E03.9    Memory deficit R41.3    DKA (diabetic ketoacidoses) E11.10    Vascular dementia without behavioral disturbance (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 ([facility] E10.649    Dehydration E86.0    Prerenal azotemia R79.89     Past Medical History:   Diagnosis Date    Diabetes ([facility] [facility]     Heart failure ([facility]     unknown to family    Hypertension     Stroke [facility])       Past Surgical History:   Procedure Laterality Date    HX GYN      HX HEENT      thyroidectomy    HX HYSTERECTOMY      MN REMOVAL GALLBLADDER      MN THYROIDECTOMY       Current Outpatient Medications   Medication Sig Dispense Refill    metoprolol succinate (TOPROL-XL) 25 mg XL tablet TAKE 1 TABLET BY MOUTH EVERY DAY 90 Tablet 3    BD Zoey 2nd Gen Pen Needle 32 gauge x 5/32\" ndle USE TO INJECT INSULIN FOUR TIMES A DAY. DX.E11.9 100 Pen Needle 11    Insulin Needles, Disposable, (Zoey Pen Needle) 32 gauge x 5/32\" ndle Use to inject insulin four times a day. Dx.e11.9 200 Pen Needle 11    losartan-hydroCHLOROthiazide (HYZAAR) 100-25 mg per tablet TAKE 1 TABLET BY MOUTH EVERY DAY 90 Tablet 3    levothyroxine (SYNTHROID) 175 mcg tablet TAKE 1 TABLET BY MOUTH EVERY DAY 90 Tablet 3    clopidogreL (PLAVIX) 75 mg tab Take 1 Tablet by mouth daily. 90 Tablet 3    amLODIPine (NORVASC) 10 mg tablet TAKE 1 TABLET BY MOUTH EVERY DAY 90 Tablet 3    pravastatin (PRAVACHOL) 80 mg tablet TAKE 1 TABLET BY MOUTH EVERY DAY 90 Tablet 3    NovoLOG Flexpen U-100 Insulin 100 unit/mL (3 mL) inpn INJECT 3 UNITS SUBCUTANEOUSLY BEFORE BREAKFAST AND LUNCH AND 2 UNITS BEFORE DINNER 1 Adjustable Dose Pre-filled Pen Syringe 11    insulin degludec Jolena Mitts FlexTouch U-100) 100 unit/mL (3 mL) inpn 16 Units by SubCUTAneous route daily. 2 Adjustable Dose Pre-filled Pen Syringe 11    traZODone (DESYREL) 50 mg tablet Take 1 Tablet by mouth nightly.  30 Tablet 11    benztropine (COGENTIN) 1 mg tablet TAKE 1 TABLET BY MOUTH TWICE A  Tablet 3    brimonidine (ALPHAGAN) 0.15 % ophthalmic solution Administer 1 Drop to both eyes two (2) times a day. 15 mL 5    lidocaine (Lidoderm) 5 % Apply patch to the affected area for 12 hours a day and remove for 12 hours a day. 15 Each 0    haloperidoL (HALDOL) 2 mg tablet Take 2 tablets twice a day 120 Tab 5    zinc oxide 20 % ointment Apply 1 Applicator to affected area two (2) times a day.  thin layer gluteal cleft       No Known Allergies    Family History   Problem Relation Age of Onset    Diabetes Father     No Known Problems Mother      Social History     Tobacco Use    Smoking status: Never    Smokeless tobacco: Never   Substance Use Topics    Alcohol use: No     Comment: been years         Louisa Pierson MD

## 2022-11-03 LAB
ALBUMIN SERPL-MCNC: 3.6 G/DL (ref 3.5–5)
ALBUMIN/GLOB SERPL: 1.2 {RATIO} (ref 1.1–2.2)
ALP SERPL-CCNC: 103 U/L (ref 45–117)
ALT SERPL-CCNC: 54 U/L (ref 12–78)
ANION GAP SERPL CALC-SCNC: 6 MMOL/L (ref 5–15)
AST SERPL-CCNC: 23 U/L (ref 15–37)
BASOPHILS # BLD: 0 K/UL (ref 0–0.1)
BASOPHILS NFR BLD: 1 % (ref 0–1)
BILIRUB SERPL-MCNC: 0.4 MG/DL (ref 0.2–1)
BUN SERPL-MCNC: 26 MG/DL (ref 6–20)
BUN/CREAT SERPL: 23 (ref 12–20)
CALCIUM SERPL-MCNC: 8.8 MG/DL (ref 8.5–10.1)
CHLORIDE SERPL-SCNC: 102 MMOL/L (ref 97–108)
CHOLEST SERPL-MCNC: 146 MG/DL
CO2 SERPL-SCNC: 29 MMOL/L (ref 21–32)
CREAT SERPL-MCNC: 1.11 MG/DL (ref 0.55–1.02)
DIFFERENTIAL METHOD BLD: NORMAL
EOSINOPHIL # BLD: 0.1 K/UL (ref 0–0.4)
EOSINOPHIL NFR BLD: 2 % (ref 0–7)
ERYTHROCYTE [DISTWIDTH] IN BLOOD BY AUTOMATED COUNT: 12.9 % (ref 11.5–14.5)
EST. AVERAGE GLUCOSE BLD GHB EST-MCNC: 292 MG/DL
GLOBULIN SER CALC-MCNC: 3 G/DL (ref 2–4)
GLUCOSE SERPL-MCNC: 139 MG/DL (ref 65–100)
HBA1C MFR BLD: 11.8 % (ref 4–5.6)
HCT VFR BLD AUTO: 39.3 % (ref 35–47)
HDLC SERPL-MCNC: 86 MG/DL
HDLC SERPL: 1.7 {RATIO} (ref 0–5)
HGB BLD-MCNC: 13.2 G/DL (ref 11.5–16)
IMM GRANULOCYTES # BLD AUTO: 0 K/UL (ref 0–0.04)
IMM GRANULOCYTES NFR BLD AUTO: 0 % (ref 0–0.5)
LDLC SERPL CALC-MCNC: 51.2 MG/DL (ref 0–100)
LYMPHOCYTES # BLD: 0.9 K/UL (ref 0.8–3.5)
LYMPHOCYTES NFR BLD: 25 % (ref 12–49)
MCH RBC QN AUTO: 32.4 PG (ref 26–34)
MCHC RBC AUTO-ENTMCNC: 33.6 G/DL (ref 30–36.5)
MCV RBC AUTO: 96.6 FL (ref 80–99)
MONOCYTES # BLD: 0.4 K/UL (ref 0–1)
MONOCYTES NFR BLD: 11 % (ref 5–13)
NEUTS SEG # BLD: 2.3 K/UL (ref 1.8–8)
NEUTS SEG NFR BLD: 61 % (ref 32–75)
NRBC # BLD: 0 K/UL (ref 0–0.01)
NRBC BLD-RTO: 0 PER 100 WBC
PLATELET # BLD AUTO: 208 K/UL (ref 150–400)
PMV BLD AUTO: 10.5 FL (ref 8.9–12.9)
POTASSIUM SERPL-SCNC: 4.5 MMOL/L (ref 3.5–5.1)
PROT SERPL-MCNC: 6.6 G/DL (ref 6.4–8.2)
RBC # BLD AUTO: 4.07 M/UL (ref 3.8–5.2)
SODIUM SERPL-SCNC: 137 MMOL/L (ref 136–145)
TRIGL SERPL-MCNC: 44 MG/DL (ref ?–150)
TSH SERPL DL<=0.05 MIU/L-ACNC: 0.67 UIU/ML (ref 0.36–3.74)
VLDLC SERPL CALC-MCNC: 8.8 MG/DL
WBC # BLD AUTO: 3.8 K/UL (ref 3.6–11)

## 2022-11-05 LAB — APO B SERPL-MCNC: 55 MG/DL

## 2022-11-06 NOTE — PATIENT INSTRUCTIONS
Medicare Wellness Visit, Female     The best way to live healthy is to have a lifestyle where you eat a well-balanced diet, exercise regularly, limit alcohol use, and quit all forms of tobacco/nicotine, if applicable. Regular preventive services are another way to keep healthy. Preventive services (vaccines, screening tests, monitoring & exams) can help personalize your care plan, which helps you manage your own care. Screening tests can find health problems at the earliest stages, when they are easiest to treat. [facility] follows the current, evidence-based guidelines published by the [facility] ([facility]) when recommending preventive services for our patients. Because we follow these guidelines, sometimes recommendations change over time as research supports it. (For example, mammograms used to be recommended annually. Even though Medicare will still pay for an annual mammogram, the newer guidelines recommend a mammogram every two years for women of average risk). Of course, you and your doctor may decide to screen more often for some diseases, based on your risk and your co-morbidities (chronic disease you are already diagnosed with). Preventive services for you include:  - Medicare offers their members a free annual wellness visit, which is time for you and your primary care provider to discuss and plan for your preventive service needs. Take advantage of this benefit every year!  -All adults over the age of 72 should receive the recommended pneumonia vaccines. Current USPSTF guidelines recommend a series of two vaccines for the best pneumonia protection.   -All adults should have a flu vaccine yearly and a tetanus vaccine every 10 years.   -All adults age 48 and older should receive the shingles vaccines (series of two vaccines).       -All adults age 38-68 who are overweight should have a diabetes screening test once every three years.   -All adults born between [birth year] and 1964 should be screened once for Hepatitis C.  -Other screening tests and preventive services for persons with diabetes include: an eye exam to screen for diabetic retinopathy, a kidney function test, a foot exam, and stricter control over your cholesterol.   -Cardiovascular screening for adults with routine risk involves an electrocardiogram (ECG) at intervals determined by your doctor.   -Colorectal cancer screenings should be done for adults age 54-65 with no increased risk factors for colorectal cancer. There are a number of acceptable methods of screening for this type of cancer. Each test has its own benefits and drawbacks. Discuss with your doctor what is most appropriate for you during your annual wellness visit. The different tests include: colonoscopy (considered the best screening method), a fecal occult blood test, a fecal DNA test, and sigmoidoscopy.    -A bone mass density test is recommended when a woman turns 65 to screen for osteoporosis. This test is only recommended one time, as a screening. Some providers will use this same test as a disease monitoring tool if you already have osteoporosis. -Breast cancer screenings are recommended every other year for women of normal risk, age 54-69.  -Cervical cancer screenings for women over age 72 are only recommended with certain risk factors.      Here is a list of your current Health Maintenance items (your personalized list of preventive services) with a due date:  Health Maintenance Due   Topic Date Due    Shingles Vaccine (1 of 2) Never done    Eye Exam  05/08/2017    Diabetic Foot Care  05/28/2020    Albumin Urine Test  10/15/2020    COVID-19 Vaccine (2 - Moderna series) 08/20/2021    Yearly Flu Vaccine (1) Never done

## 2022-12-23 ENCOUNTER — PATIENT OUTREACH (OUTPATIENT)
Dept: CASE MANAGEMENT | Age: 82
End: 2022-12-23

## 2022-12-23 NOTE — PROGRESS NOTES
Ambulatory Care Management Note    Date/Time:  12/23/2022 2:55 PM    This patient was received as a referral from 1 [employer]. Ambulatory Care Manager outreached to patient today to offer care management services. Spoke with caretaker daughter Colten. Introduction to self and role of care manager provided. Patient accepted care management services at this time. Follow up call scheduled at this time. Patient has Ambulatory Care Manager's contact number for any questions or concerns.

## 2023-02-01 ENCOUNTER — PATIENT OUTREACH (OUTPATIENT)
Dept: CASE MANAGEMENT | Age: 83
End: 2023-02-01

## 2023-02-28 ENCOUNTER — PATIENT OUTREACH (OUTPATIENT)
Dept: CASE MANAGEMENT | Age: 83
End: 2023-02-28

## 2023-02-28 NOTE — ACP (ADVANCE CARE PLANNING)
Advance Care Planning:   Does patient have an Advance Directive:  currently not on file; education provided   Daughter Jose Patel remains unchanged as [facility]. Decision Maker.

## 2023-02-28 NOTE — PROGRESS NOTES
Ambulatory Care Management Note    Date/Time:  2/28/2023 1:35 PM    Patient Current Location: [place]    This [facility]) reviewed and updated the following screenings during this call; self management assessment    Patient's challenges to self-management identified:   medical condition      Medication Management:  good adherence    Advance Care Planning:   Does patient have an Advance Directive:  currently not on file; education provided     [employer], Referrals, and Durable Medical Equipment: none      Health Maintenance Due   Topic Date Due    Shingles Vaccine (1 of 2) Never done    Eye Exam Retinal or Dilated  05/08/2017    Foot Exam Q1  05/28/2020    COVID-19 Vaccine (2 - Moderna series) 08/20/2021    Flu Vaccine (1) Never done     Health Maintenance Reviewed: COVID vaccine    Patient was asked to consider health care goals that they would like to focus on with this ACM. ACM will follow up with patient to discuss goals and establish care plan in the next 7-14 days. PCP/Specialist follow up: No future appointments. Ambulatory Care Management Note      Date/Time:  2/28/2023 1:36 PM    Patient Current Location: [place]     This patient was received as a referral from 1 "[employer]. Top Challenges reviewed with the provider   Caretaker daughter reports patient not sleeping at night;  trying to open doors, talking to self loudly; waking everyone up in the house. Reports plans for SNF placement; recommended [facility] facility. Ambulatory  contacted patient for discussion and case management of CCM:  DM/Pre-renal Azotemia/HTN   Summary of patients top problems: Patient remains on medication for DM & HTN. Teachings done on [employer] BS as well as elevated BP to be continued. Continue assistance to be provided for Dementia; discussed 3 areas with dementia units (patient's choice).      Patient's challenges to self management identified:   medical condition      Medication Management:  good adherence    Advance Care Planning:   Does patient have an Advance Directive:  currently not on file; education provided   Daughter Fay Reynolds remains unchanged as [facility]. Decision Maker. [employer], Referrals, and Durable Medical Equipment: none    PCP/Specialist follow up: No future appointments. Goals Addressed                   This Visit's Progress     Supportive resources in place to maintain patient in the community (ie. Home Health, DME equipment, refer to, medication assistant plan, etc.)        2/28/2023:  Dementia:  patient's caretaker daughter reports patient not sleeping at night; reports worsening condition at night. Meds taken as ordered. EW ACM           Goals f/up: 10 days     Patient verbalized understanding of all information discussed. Patient has this Ambulatory Care Manager's contact information for any further questions, concerns, or needs.

## 2023-03-21 ENCOUNTER — HOSPITAL ENCOUNTER (EMERGENCY)
Age: 83
Discharge: HOME | End: 2023-03-22
Attending: EMERGENCY MEDICINE
Payer: MEDICARE

## 2023-03-21 DIAGNOSIS — E16.2 HYPOGLYCEMIA: ICD-10-CM

## 2023-03-21 DIAGNOSIS — R11.2 NAUSEA AND VOMITING, UNSPECIFIED VOMITING TYPE: Primary | ICD-10-CM

## 2023-03-21 LAB
ALBUMIN SERPL-MCNC: 3.3 G/DL (ref 3.5–5)
ALBUMIN/GLOB SERPL: 0.9 (ref 1.1–2.2)
ALP SERPL-CCNC: 69 U/L (ref 45–117)
ALT SERPL-CCNC: 29 U/L (ref 12–78)
ANION GAP SERPL CALC-SCNC: 6 MMOL/L (ref 5–15)
APPEARANCE UR: CLEAR
AST SERPL-CCNC: 27 U/L (ref 15–37)
BACTERIA URNS QL MICRO: NEGATIVE /HPF
BASOPHILS # BLD: 0 K/UL (ref 0–0.1)
BASOPHILS NFR BLD: 0 % (ref 0–1)
BILIRUB SERPL-MCNC: 0.5 MG/DL (ref 0.2–1)
BILIRUB UR QL: NEGATIVE
BUN SERPL-MCNC: 25 MG/DL (ref 6–20)
BUN/CREAT SERPL: 36 (ref 12–20)
CALCIUM SERPL-MCNC: 8.9 MG/DL (ref 8.5–10.1)
CHLORIDE SERPL-SCNC: 105 MMOL/L (ref 97–108)
CO2 SERPL-SCNC: 28 MMOL/L (ref 21–32)
COLOR UR: ABNORMAL
CREAT SERPL-MCNC: 0.7 MG/DL (ref 0.55–1.02)
DIFFERENTIAL METHOD BLD: ABNORMAL
EOSINOPHIL # BLD: 0 K/UL (ref 0–0.4)
EOSINOPHIL NFR BLD: 0 % (ref 0–7)
EPITH CASTS URNS QL MICRO: ABNORMAL /LPF
ERYTHROCYTE [DISTWIDTH] IN BLOOD BY AUTOMATED COUNT: 12.5 % (ref 11.5–14.5)
GLOBULIN SER CALC-MCNC: 3.8 G/DL (ref 2–4)
GLUCOSE BLD STRIP.AUTO-MCNC: 62 MG/DL (ref 65–117)
GLUCOSE BLD STRIP.AUTO-MCNC: 62 MG/DL (ref 65–117)
GLUCOSE SERPL-MCNC: 59 MG/DL (ref 65–100)
GLUCOSE UR STRIP.AUTO-MCNC: NEGATIVE MG/DL
HCT VFR BLD AUTO: 36.4 % (ref 35–47)
HGB BLD-MCNC: 12.8 G/DL (ref 11.5–16)
HGB UR QL STRIP: NEGATIVE
IMM GRANULOCYTES # BLD AUTO: 0 K/UL (ref 0–0.04)
IMM GRANULOCYTES NFR BLD AUTO: 0 % (ref 0–0.5)
KETONES UR QL STRIP.AUTO: 15 MG/DL
LEUKOCYTE ESTERASE UR QL STRIP.AUTO: ABNORMAL
LYMPHOCYTES # BLD: 0.3 K/UL (ref 0.8–3.5)
LYMPHOCYTES NFR BLD: 7 % (ref 12–49)
MCH RBC QN AUTO: 32.2 PG (ref 26–34)
MCHC RBC AUTO-ENTMCNC: 35.2 G/DL (ref 30–36.5)
MCV RBC AUTO: 91.5 FL (ref 80–99)
MONOCYTES # BLD: 0.3 K/UL (ref 0–1)
MONOCYTES NFR BLD: 7 % (ref 5–13)
NEUTS SEG # BLD: 4.2 K/UL (ref 1.8–8)
NEUTS SEG NFR BLD: 86 % (ref 32–75)
NITRITE UR QL STRIP.AUTO: NEGATIVE
NRBC # BLD: 0 K/UL (ref 0–0.01)
NRBC BLD-RTO: 0 PER 100 WBC
PH UR STRIP: 5 (ref 5–8)
PLATELET # BLD AUTO: 234 K/UL (ref 150–400)
PMV BLD AUTO: 10.1 FL (ref 8.9–12.9)
POTASSIUM SERPL-SCNC: 3.1 MMOL/L (ref 3.5–5.1)
PROT SERPL-MCNC: 7.1 G/DL (ref 6.4–8.2)
PROT UR STRIP-MCNC: NEGATIVE MG/DL
RBC # BLD AUTO: 3.98 M/UL (ref 3.8–5.2)
RBC #/AREA URNS HPF: ABNORMAL /HPF (ref 0–5)
RBC MORPH BLD: ABNORMAL
SERVICE CMNT-IMP: ABNORMAL
SERVICE CMNT-IMP: ABNORMAL
SODIUM SERPL-SCNC: 139 MMOL/L (ref 136–145)
SP GR UR REFRACTOMETRY: 1.02
UA: UC IF INDICATED,UAUC: ABNORMAL
UROBILINOGEN UR QL STRIP.AUTO: 1 EU/DL (ref 0.2–1)
WBC # BLD AUTO: 4.8 K/UL (ref 3.6–11)
WBC URNS QL MICRO: ABNORMAL /HPF (ref 0–4)

## 2023-03-21 PROCEDURE — 81001 URINALYSIS AUTO W/SCOPE: CPT

## 2023-03-21 PROCEDURE — 85025 COMPLETE CBC W/AUTO DIFF WBC: CPT

## 2023-03-21 PROCEDURE — 96374 THER/PROPH/DIAG INJ IV PUSH: CPT

## 2023-03-21 PROCEDURE — 80053 COMPREHEN METABOLIC PANEL: CPT

## 2023-03-21 PROCEDURE — 99284 EMERGENCY DEPT VISIT MOD MDM: CPT

## 2023-03-21 PROCEDURE — 74011250636 HC RX REV CODE- 250/636: Performed by: EMERGENCY MEDICINE

## 2023-03-21 PROCEDURE — 96361 HYDRATE IV INFUSION ADD-ON: CPT

## 2023-03-21 PROCEDURE — 82962 GLUCOSE BLOOD TEST: CPT

## 2023-03-21 PROCEDURE — 36415 COLL VENOUS BLD VENIPUNCTURE: CPT

## 2023-03-21 PROCEDURE — 93005 ELECTROCARDIOGRAM TRACING: CPT

## 2023-03-21 RX ORDER — ONDANSETRON 2 MG/ML
4 INJECTION INTRAMUSCULAR; INTRAVENOUS
Status: COMPLETED | OUTPATIENT
Start: 2023-03-21 | End: 2023-03-21

## 2023-03-21 RX ADMIN — SODIUM CHLORIDE 500 ML: 9 INJECTION, SOLUTION INTRAVENOUS at 21:26

## 2023-03-21 RX ADMIN — ONDANSETRON 4 MG: 2 INJECTION INTRAMUSCULAR; INTRAVENOUS at 21:27

## 2023-03-22 VITALS
RESPIRATION RATE: 13 BRPM | WEIGHT: 145 LBS | TEMPERATURE: 98 F | BODY MASS INDEX: 24.75 KG/M2 | HEART RATE: 91 BPM | HEIGHT: 64 IN | SYSTOLIC BLOOD PRESSURE: 157 MMHG | DIASTOLIC BLOOD PRESSURE: 60 MMHG | OXYGEN SATURATION: 98 %

## 2023-03-22 LAB
ATRIAL RATE: 92 BPM
CALCULATED P AXIS, ECG09: 49 DEGREES
CALCULATED R AXIS, ECG10: -50 DEGREES
CALCULATED T AXIS, ECG11: -39 DEGREES
DIAGNOSIS, 93000: NORMAL
GLUCOSE BLD STRIP.AUTO-MCNC: 76 MG/DL (ref 65–117)
P-R INTERVAL, ECG05: 172 MS
Q-T INTERVAL, ECG07: 368 MS
QRS DURATION, ECG06: 86 MS
QTC CALCULATION (BEZET), ECG08: 455 MS
SERVICE CMNT-IMP: NORMAL
VENTRICULAR RATE, ECG03: 92 BPM

## 2023-03-22 PROCEDURE — 82962 GLUCOSE BLOOD TEST: CPT

## 2023-03-22 RX ORDER — ONDANSETRON 4 MG/1
4 TABLET, FILM COATED ORAL
Qty: 20 TABLET | Refills: 0 | Status: SHIPPED | OUTPATIENT
Start: 2023-03-22

## 2023-03-22 NOTE — ED PROVIDER NOTES
EMERGENCY DEPARTMENT HISTORY AND PHYSICAL EXAM     ----------------------------------------------------------------------------  Please note that this dictation was completed with Versus, the app2you voice recognition software. Quite often unanticipated grammatical, syntax, homophones, and other interpretive errors are inadvertently transcribed by the computer software. Please disregard these errors. Please excuse any errors that have escaped final proofreading  ----------------------------------------------------------------------------      Date: 3/21/2023  Patient Name: Yash Herr    History of Presenting Illness     Chief Complaint   Patient presents with    Low Blood Sugar     Pt has had n/v today and has still been taking her insulin. When EMS arrived they got a blood glucose of 26. They gave oral glucose then d10. After a glucose recheck by EMS it read high. Glucose will be rechecked        History obtainted from:  Patient    Other independent source of history: EMS    HPI: Yash Herr is a 81 y.o. female, with significant pmhx of vascular dementia, DM, hypertension, previous stroke with chronic anticoagulation with Plavix who presents via EMS to the ED with c/o nausea, vomiting throughout today while still taking her insulin and noted to be hypoglycemic on EMS arrival.  Was given oral glucose and then D10 with improvement now having their monitor read \"high. \"  Patient is feeling much improved since EMSs arrival.  Was unaware that she had been vomiting earlier today when asked. Denies associated aminal pain, fever that she is aware of. Denies chest pain, shortness of breath or cough. Patient is she lives at home with her schizophrenic son who helps take care of her. PCP: Roseanna Jordan MD    No Known Allergies    Current Outpatient Medications   Medication Sig Dispense Refill    ondansetron hcl (Zofran) 4 mg tablet Take 1 Tablet by mouth every eight (8) hours as needed for Nausea.  21 Tablet 0    Tresiba FlexTouch U-100 100 unit/mL (3 mL) inpn INJECT 16 UNITS UNDER THE SKIN ONCE DAILY 2 Adjustable Dose Pre-filled Pen Syringe 11    clopidogreL (PLAVIX) 75 mg tab TAKE 1 TABLET BY MOUTH EVERY DAY 90 Tablet 3    NovoLOG FlexPen U-100 Insulin 100 unit/mL (3 mL) inpn INJECT 3 UNITS SUBCUTANEOUSLY BEFORE BREAKFAST AND LUNCH AND 2 UNITS BEFORE DINNER 1 Adjustable Dose Pre-filled Pen Syringe 11    traZODone (DESYREL) 50 mg tablet TAKE 1 TABLET BY MOUTH NIGHTLY 30 Tablet 9    brimonidine (ALPHAGAN) 0.15 % ophthalmic solution ADMINISTER 1 DROP TO BOTH EYES TWO (2) TIMES A DAY. 5 mL 23    levothyroxine (SYNTHROID) 175 mcg tablet TAKE 1 TABLET BY MOUTH EVERY DAY 90 Tablet 3    pravastatin (PRAVACHOL) 80 mg tablet TAKE 1 TABLET BY MOUTH EVERY DAY 90 Tablet 3    amLODIPine (NORVASC) 10 mg tablet TAKE 1 TABLET BY MOUTH EVERY DAY 90 Tablet 3    benztropine (COGENTIN) 1 mg tablet TAKE 1 TABLET BY MOUTH TWICE A  Tablet 3    metoprolol succinate (TOPROL-XL) 25 mg XL tablet TAKE 1 TABLET BY MOUTH EVERY DAY 90 Tablet 3    BD Zoey 2nd Gen Pen Needle 32 gauge x 5/32\" ndle USE TO INJECT INSULIN FOUR TIMES A DAY. DX.E11.9 100 Pen Needle 11    Insulin Needles, Disposable, (Zoey Pen Needle) 32 gauge x 5/32\" ndle Use to inject insulin four times a day. Dx.e11.9 200 Pen Needle 11    losartan-hydroCHLOROthiazide (HYZAAR) 100-25 mg per tablet TAKE 1 TABLET BY MOUTH EVERY DAY 90 Tablet 3    lidocaine (Lidoderm) 5 % Apply patch to the affected area for 12 hours a day and remove for 12 hours a day. 15 Each 0    haloperidoL (HALDOL) 2 mg tablet Take 2 tablets twice a day (Patient not taking: Reported on 2/28/2023) 120 Tab 5    zinc oxide 20 % ointment Apply 1 Applicator to affected area two (2) times a day.  thin layer gluteal cleft         Past History     Past Medical History:  Past Medical History:   Diagnosis Date    Diabetes ([address]     Heart failure ([facility] [address]     unknown to family    Hypertension     Stroke [facility])        Past Surgical History:  Past Surgical History:   Procedure Laterality Date    HX GYN      HX HEENT      thyroidectomy    HX HYSTERECTOMY      AR REMOVAL GALLBLADDER      AR THYROIDECTOMY         Family History:  Family History   Problem Relation Age of Onset    Diabetes Father     No Known Problems Mother        Social History:  Social History     Tobacco Use    Smoking status: Never    Smokeless tobacco: Never   Vaping Use    Vaping Use: Never used   Substance Use Topics    Alcohol use: No     Comment: been years    Drug use: No       Allergies:  No Known Allergies      Review of Systems   Review of Systems   Constitutional: Negative. Negative for fever. Eyes: Negative. Respiratory: Negative. Negative for shortness of breath. Cardiovascular:  Negative for chest pain. Gastrointestinal:  Positive for nausea and vomiting. Negative for abdominal pain and diarrhea. Endocrine: Negative. Genitourinary: Negative. Musculoskeletal: Negative. Skin: Negative. Neurological: Negative. Physical Exam     ED Triage Vitals   ED Encounter Vitals Group      BP       Pulse       Resp       Temp       Temp src       SpO2       Weight       Height       Physical Exam  Vitals and nursing note reviewed. Constitutional:       General: She is not in acute distress. Appearance: She is well-developed. She is not diaphoretic. Comments: Frail-appearing   HENT:      Head: Normocephalic and atraumatic. Nose: Nose normal.   Eyes:      General: No scleral icterus. Conjunctiva/sclera: Conjunctivae normal.   Neck:      Trachea: No tracheal deviation. Cardiovascular:      Rate and Rhythm: Normal rate and regular rhythm. Heart sounds: Normal heart sounds. Pulmonary:      Effort: Pulmonary effort is normal. No respiratory distress. Abdominal:      General: There is no distension. Musculoskeletal:         General: No tenderness. Normal range of motion. Cervical back: Normal range of motion. Skin:     General: Skin is warm and dry. Neurological:      Mental Status: She is alert and oriented to person, place, and time. Cranial Nerves: No cranial nerve deficit. Psychiatric:         Behavior: Behavior normal.         Thought Content: Thought content normal.         CURRENT MEDICATIONS      Previous Medications    AMLODIPINE (NORVASC) 10 MG TABLET    TAKE 1 TABLET BY MOUTH EVERY DAY    BD TAPAN 2ND GEN PEN NEEDLE 32 GAUGE X 5/32\" NDLE    USE TO INJECT INSULIN FOUR TIMES A DAY. DX.E11.9    BENZTROPINE (COGENTIN) 1 MG TABLET    TAKE 1 TABLET BY MOUTH TWICE A DAY    BRIMONIDINE (ALPHAGAN) 0.15 % OPHTHALMIC SOLUTION    ADMINISTER 1 DROP TO BOTH EYES TWO (2) TIMES A DAY. CLOPIDOGREL (PLAVIX) 75 MG TAB    TAKE 1 TABLET BY MOUTH EVERY DAY    HALOPERIDOL (HALDOL) 2 MG TABLET    Take 2 tablets twice a day    INSULIN NEEDLES, DISPOSABLE, (TAPAN PEN NEEDLE) 32 GAUGE X 5/32\" NDLE    Use to inject insulin four times a day. Dx.e11.9    LEVOTHYROXINE (SYNTHROID) 175 MCG TABLET    TAKE 1 TABLET BY MOUTH EVERY DAY    LIDOCAINE (LIDODERM) 5 %    Apply patch to the affected area for 12 hours a day and remove for 12 hours a day. LOSARTAN-HYDROCHLOROTHIAZIDE (HYZAAR) 100-25 MG PER TABLET    TAKE 1 TABLET BY MOUTH EVERY DAY    METOPROLOL SUCCINATE (TOPROL-XL) 25 MG XL TABLET    TAKE 1 TABLET BY MOUTH EVERY DAY    NOVOLOG FLEXPEN U-100 INSULIN 100 UNIT/ML (3 ML) INPN    INJECT 3 UNITS SUBCUTANEOUSLY BEFORE BREAKFAST AND LUNCH AND 2 UNITS BEFORE DINNER    PRAVASTATIN (PRAVACHOL) 80 MG TABLET    TAKE 1 TABLET BY MOUTH EVERY DAY    TRAZODONE (DESYREL) 50 MG TABLET    TAKE 1 TABLET BY MOUTH NIGHTLY    TRESIBA FLEXTOUCH U-100 100 UNIT/ML (3 ML) INPN    INJECT 16 UNITS UNDER THE SKIN ONCE DAILY    ZINC OXIDE 20 % OINTMENT    Apply 1 Applicator to affected area two (2) times a day.  thin layer gluteal cleft          Diagnostic Study Results     Labs:     Recent Results (from the past 24 hour(s))   METABOLIC PANEL, COMPREHENSIVE    Collection Time: 03/21/23  9:13 PM   Result Value Ref Range    Sodium 139 136 - 145 mmol/L    Potassium 3.1 (L) 3.5 - 5.1 mmol/L    Chloride 105 97 - 108 mmol/L    CO2 28 21 - 32 mmol/L    Anion gap 6 5 - 15 mmol/L    Glucose 59 (L) 65 - 100 mg/dL    BUN 25 (H) 6 - 20 MG/DL    Creatinine 0.70 0.55 - 1.02 MG/DL    BUN/Creatinine ratio 36 (H) 12 - 20      eGFR >60 >60 ml/min/1.73m2    Calcium 8.9 8.5 - 10.1 MG/DL    Bilirubin, total 0.5 0.2 - 1.0 MG/DL    ALT (SGPT) 29 12 - 78 U/L    AST (SGOT) 27 15 - 37 U/L    Alk. phosphatase 69 45 - 117 U/L    Protein, total 7.1 6.4 - 8.2 g/dL    Albumin 3.3 (L) 3.5 - 5.0 g/dL    Globulin 3.8 2.0 - 4.0 g/dL    A-G Ratio 0.9 (L) 1.1 - 2.2     CBC WITH AUTOMATED DIFF    Collection Time: 03/21/23  9:13 PM   Result Value Ref Range    WBC 4.8 3.6 - 11.0 K/uL    RBC 3.98 3.80 - 5.20 M/uL    HGB 12.8 11.5 - 16.0 g/dL    HCT 36.4 35.0 - 47.0 %    MCV 91.5 80.0 - 99.0 FL    MCH 32.2 26.0 - 34.0 PG    MCHC 35.2 30.0 - 36.5 g/dL    RDW 12.5 11.5 - 14.5 %    PLATELET 822 677 - 321 K/uL    MPV 10.1 8.9 - 12.9 FL    NRBC 0.0 0  WBC    ABSOLUTE NRBC 0.00 0.00 - 0.01 K/uL    NEUTROPHILS 86 (H) 32 - 75 %    LYMPHOCYTES 7 (L) 12 - 49 %    MONOCYTES 7 5 - 13 %    EOSINOPHILS 0 0 - 7 %    BASOPHILS 0 0 - 1 %    IMMATURE GRANULOCYTES 0 0.0 - 0.5 %    ABS. NEUTROPHILS 4.2 1.8 - 8.0 K/UL    ABS. LYMPHOCYTES 0.3 (L) 0.8 - 3.5 K/UL    ABS. MONOCYTES 0.3 0.0 - 1.0 K/UL    ABS. EOSINOPHILS 0.0 0.0 - 0.4 K/UL    ABS. BASOPHILS 0.0 0.0 - 0.1 K/UL    ABS. IMM.  GRANS. 0.0 0.00 - 0.04 K/UL    DF SMEAR SCANNED      RBC COMMENTS NORMOCYTIC, NORMOCHROMIC     EKG, 12 LEAD, INITIAL    Collection Time: 03/21/23  9:16 PM   Result Value Ref Range    Ventricular Rate 92 BPM    Atrial Rate 92 BPM    P-R Interval 172 ms    QRS Duration 86 ms    Q-T Interval 368 ms    QTC Calculation (Bezet) 455 ms    Calculated P Axis 49 degrees    Calculated R Axis -50 degrees    Calculated T Axis -39 degrees Diagnosis       Normal sinus rhythm  Left axis deviation  Possible Anterior infarct (cited on or before 30-NOV-2020)  When compared with ECG of 30-NOV-2020 13:30,  Inverted T waves have replaced nonspecific T wave abnormality in Inferior   leads     GLUCOSE, POC    Collection Time: 03/21/23  9:36 PM   Result Value Ref Range    Glucose (POC) 62 (L) 65 - 117 mg/dL    Performed by Romie Hallman ([place] RN)    URINALYSIS W/ REFLEX CULTURE    Collection Time: 03/21/23  9:50 PM    Specimen: Urine   Result Value Ref Range    Color YELLOW/STRAW      Appearance CLEAR CLEAR      Specific gravity 1.016      pH (UA) 5.0 5.0 - 8.0      Protein Negative NEG mg/dL    Glucose Negative NEG mg/dL    Ketone 15 (A) NEG mg/dL    Bilirubin Negative NEG      Blood Negative NEG      Urobilinogen 1.0 0.2 - 1.0 EU/dL    Nitrites Negative NEG      Leukocyte Esterase SMALL (A) NEG      WBC 0-4 0 - 4 /hpf    RBC 0-5 0 - 5 /hpf    Epithelial cells FEW FEW /lpf    Bacteria Negative NEG /hpf    UA:UC IF INDICATED CULTURE NOT INDICATED BY UA RESULT CNI     GLUCOSE, POC    Collection Time: 03/21/23 11:14 PM   Result Value Ref Range    Glucose (POC) 62 (L) 65 - 117 mg/dL    Performed by Romie Hallman ([place] RN)    GLUCOSE, POC    Collection Time: 03/22/23 12:27 AM   Result Value Ref Range    Glucose (POC) 76 65 - 117 mg/dL    Performed by Romie Hallman ([place] RN)        EKG: If performed, independent interpretation documented below      RADIOLOGY:  Non-plain film images such as CT, Ultrasound and MRI are read by the radiologist. Plain radiographic images are visualized and preliminarily interpreted by the ED Provider with the findings documented in the MDM section. Interpretation per the Radiologist below, if available at the time of this note:  Radiologic Studies:  No orders to display     CT Results  (Last 48 hours)      None          CXR Results  (Last 48 hours)      None                ED Course     I am the first provider for this patient.     Initial assessment performed. The patients presenting problems have been discussed, and they are in agreement with the care plan formulated and outlined with them. I have encouraged them to ask questions as they arise throughout their visit. Records Review:  I reviewed and interpreted the nursing notes and and vital signs from today's visit, as well as the electronic medical record system for any external medical records that were available that may contribute to the patients current condition, including independent interpretation of previous evaluation by primary care for essential hypertension and vascular dementia    Nursing notes will be reviewed and interpreted by me as they become available in realtime while the pt has been in the ED. Vital Signs-Reviewed the patient's vital signs. Patient Vitals for the past 12 hrs:   Temp Pulse Resp BP SpO2   03/21/23 2317 -- 98 22 (!) 118/91 98 %   03/21/23 2108 98 °F (36.7 °C) 91 18 (!) 155/59 100 %       Pulse Oximetry Analysis:  100% on RA, normal    Heart Monitory Analysis:  Rate: 91 bpm  Rhythm: nsr    EKG interpretation 2116: NSR, L: Axis, rate 92; , QRS 86, QTc 455; NO STEMI interpreted by Steven Lyles MD    DDX:  Hypoglycemia, electrolyte derangement, UTI, dehydration    Plan:  Labs, Accu-Chek, IV fluids, UA           MDM:  Patient is a nontoxic-appearing 81-year-old female who is pleasantly demented who arrives by EMS with complaints of nausea, vomiting and hyperglycemia. Patient noted to have no recurrence of nausea, vomiting while here in the emergency department. Has been able to eat multiple items with improvement of her blood sugar without D50. Will discharge back to her home where she lives with her son. Electrolytes show mild dehydration but otherwise without concern for infectious etiology of her symptoms. We will have her hold her diabetes medication tomorrow.     PROCEDURES   None      CRITICAL CARE TIME      None    FINAL IMPRESSION     1. Nausea and vomiting, unspecified vomiting type    2. Hypoglycemia          DISPOSITION/PLAN     Discharge Note: The patient is stable for discharge home. The signs, symptoms, diagnosis, and discharge instructions have been discussed, understanding conveyed, and agreed upon. The patient is to follow up as recommended or return to ER should their symptoms worsen. Deana Chase's  results have been reviewed with her. She has been counseled regarding her diagnosis, treatment, and plan. She verbally conveys understanding and agreement of the signs, symptoms, diagnosis, treatment and prognosis and additionally agrees to follow up as discussed. She also agrees with the care-plan and conveys that all of her questions have been answered. I have also provided discharge instructions for her that include: educational information regarding their diagnosis and treatment, and list of reasons why they would want to return to the ED prior to their follow-up appointment, should her condition change. PATIENT REFERRED TO:  Follow-up Information       Follow up With Specialties Details Why Contact Info    Courtland Kussmaul, MD Internal Medicine Physician Schedule an appointment as soon as possible for a visit in 2 days  [facility]  [address]  [place] [address] [phone]      [facility] EMERGENCY DEPT Emergency Medicine  As needed, If symptoms worsen [address] [place] [address]  [phone]              DISCHARGE MEDICATIONS:  Current Discharge Medication List        START taking these medications    Details   ondansetron hcl (Zofran) 4 mg tablet Take 1 Tablet by mouth every eight (8) hours as needed for Nausea. Qty: 20 Tablet, Refills: 0  Start date: 3/22/2023               DISCONTINUED MEDICATIONS:  Current Discharge Medication List          I am the Primary Clinician of Record.    Electronically Signed:  Marilee Castellanos MD    (Please note that parts of this dictation were completed with voice recognition software. Quite often unanticipated grammatical, syntax, homophones, and other interpretive errors are inadvertently transcribed by the computer software. Please disregards these errors.  Please excuse any errors that have escaped final proofreading.)

## 2023-04-05 ENCOUNTER — PATIENT OUTREACH (OUTPATIENT)
Dept: CASE MANAGEMENT | Age: 83
End: 2023-04-05

## 2023-04-17 RX ORDER — LOSARTAN POTASSIUM AND HYDROCHLOROTHIAZIDE 25; 100 MG/1; MG/1
TABLET ORAL
Qty: 90 TABLET | Refills: 3 | Status: SHIPPED | OUTPATIENT
Start: 2023-04-17

## 2023-04-20 ENCOUNTER — PATIENT OUTREACH (OUTPATIENT)
Dept: CASE MANAGEMENT | Age: 83
End: 2023-04-20

## 2023-04-21 NOTE — PROGRESS NOTES
Social Work Note  SW received referral from Marika Mathis requesting follow up with pt's daughter to assistance with LTC placement. SW completed chart review and left voicemail with Kim Unger requesting call back. SW will follow up once calls returned.   Jairo Winchester  Ambulatory Care Coordination Team  Phone: [phone]

## 2023-04-27 ENCOUNTER — PATIENT OUTREACH (OUTPATIENT)
Dept: CASE MANAGEMENT | Age: 83
End: 2023-04-27

## 2023-04-27 NOTE — PROGRESS NOTES
Social Work Note  4/27/2023  Follow up  SW left voicemail with pt's daughter requesting call back. SW will follow up once call is returned. [facility]  Ambulatory Care Coordination Team  Phone: [phone]     4/20/23  Follow up  SW received referral from CINDI eLal requesting follow up with pt's daughter to assistance with LTC placement. SW completed chart review and left voicemail with Nena More requesting call back. SW will follow up once calls returned.   [facility] Valadez [facility]  Ambulatory Care Coordination Team  Phone: [phone]

## 2023-05-03 ENCOUNTER — PATIENT OUTREACH (OUTPATIENT)
Dept: CASE MANAGEMENT | Age: 83
End: 2023-05-03

## 2023-05-05 ENCOUNTER — PATIENT OUTREACH (OUTPATIENT)
Dept: CASE MANAGEMENT | Age: 83
End: 2023-05-05

## 2023-08-11 NOTE — PROGRESS NOTES
Problem: Falls - Risk of  Goal: *Absence of Falls  Description: Document Priya Led Fall Risk and appropriate interventions in the flowsheet.   Outcome: Progressing Towards Goal  Note: Fall Risk Interventions:  Mobility Interventions: Bed/chair exit alarm    Mentation Interventions: Bed/chair exit alarm    Medication Interventions: Bed/chair exit alarm    Elimination Interventions: Call light in reach    History of Falls Interventions: Bed/chair exit alarm         Problem: DKA: Day 2  Goal: *Blood glucose 80 to 180 mg/dl  Outcome: Progressing Towards Goal     Problem: Nutrition Deficit  Goal: *Optimize nutritional status  Outcome: Progressing Towards Goal [FreeTextEntry1] : Patient [place] in [place]. She attended [employer] graduate 1958. Highest was good she a lot friends she was a student. She was  in 1960. She has 3 sons ages 55 5352. Her 2 older sons are both police officers. Patient worked briefly as a  and then raised her children.

## 2023-09-20 ENCOUNTER — OFFICE VISIT (OUTPATIENT)
Facility: CLINIC | Age: 83
End: 2023-09-20
Payer: MEDICAID

## 2023-09-20 VITALS
SYSTOLIC BLOOD PRESSURE: 122 MMHG | BODY MASS INDEX: 23.71 KG/M2 | OXYGEN SATURATION: 99 % | HEIGHT: 64 IN | RESPIRATION RATE: 16 BRPM | HEART RATE: 72 BPM | TEMPERATURE: 99.3 F | DIASTOLIC BLOOD PRESSURE: 69 MMHG | WEIGHT: 138.9 LBS

## 2023-09-20 DIAGNOSIS — F01.50 VASCULAR DEMENTIA WITHOUT BEHAVIORAL DISTURBANCE (HCC): ICD-10-CM

## 2023-09-20 DIAGNOSIS — I87.2 VENOUS INSUFFICIENCY: ICD-10-CM

## 2023-09-20 DIAGNOSIS — I10 PRIMARY HYPERTENSION: ICD-10-CM

## 2023-09-20 DIAGNOSIS — E10.649 HYPOGLYCEMIA UNAWARENESS IN TYPE 1 DIABETES MELLITUS (HCC): ICD-10-CM

## 2023-09-20 DIAGNOSIS — E10.65 HYPERGLYCEMIA DUE TO TYPE 1 DIABETES MELLITUS (HCC): Primary | ICD-10-CM

## 2023-09-20 DIAGNOSIS — E78.5 DYSLIPIDEMIA: ICD-10-CM

## 2023-09-20 DIAGNOSIS — Z91.81 AT HIGH RISK FOR FALLS: ICD-10-CM

## 2023-09-20 DIAGNOSIS — E89.0 POSTABLATIVE HYPOTHYROIDISM: ICD-10-CM

## 2023-09-20 PROCEDURE — 36415 COLL VENOUS BLD VENIPUNCTURE: CPT | Performed by: INTERNAL MEDICINE

## 2023-09-20 PROCEDURE — 99214 OFFICE O/P EST MOD 30 MIN: CPT | Performed by: INTERNAL MEDICINE

## 2023-09-20 PROCEDURE — 3046F HEMOGLOBIN A1C LEVEL >9.0%: CPT | Performed by: INTERNAL MEDICINE

## 2023-09-20 PROCEDURE — 3074F SYST BP LT 130 MM HG: CPT | Performed by: INTERNAL MEDICINE

## 2023-09-20 PROCEDURE — 3078F DIAST BP <80 MM HG: CPT | Performed by: INTERNAL MEDICINE

## 2023-09-20 PROCEDURE — 1123F ACP DISCUSS/DSCN MKR DOCD: CPT | Performed by: INTERNAL MEDICINE

## 2023-09-20 ASSESSMENT — ANXIETY QUESTIONNAIRES
1. FEELING NERVOUS, ANXIOUS, OR ON EDGE: 0
5. BEING SO RESTLESS THAT IT IS HARD TO SIT STILL: 0
2. NOT BEING ABLE TO STOP OR CONTROL WORRYING: 0
6. BECOMING EASILY ANNOYED OR IRRITABLE: 0
IF YOU CHECKED OFF ANY PROBLEMS ON THIS QUESTIONNAIRE, HOW DIFFICULT HAVE THESE PROBLEMS MADE IT FOR YOU TO DO YOUR WORK, TAKE CARE OF THINGS AT HOME, OR GET ALONG WITH OTHER PEOPLE: NOT DIFFICULT AT ALL
7. FEELING AFRAID AS IF SOMETHING AWFUL MIGHT HAPPEN: 0
3. WORRYING TOO MUCH ABOUT DIFFERENT THINGS: 0
4. TROUBLE RELAXING: 0
GAD7 TOTAL SCORE: 0

## 2023-09-20 ASSESSMENT — PATIENT HEALTH QUESTIONNAIRE - PHQ9
SUM OF ALL RESPONSES TO PHQ QUESTIONS 1-9: 0
SUM OF ALL RESPONSES TO PHQ9 QUESTIONS 1 & 2: 0
2. FEELING DOWN, DEPRESSED OR HOPELESS: 0
SUM OF ALL RESPONSES TO PHQ QUESTIONS 1-9: 0
1. LITTLE INTEREST OR PLEASURE IN DOING THINGS: 0

## 2023-09-20 NOTE — PROGRESS NOTES
[facility]  [address] [address]  Phone:  [phone]  Fax: [phone]       Chief Complaint   Patient presents with    Follow-up    Diabetes    Hypertension     Patient here for follow up diabetes and high blood pressure. .      SUBJECTIVE:    Alton Delvalle is a 81 y.o. female  Dictation on: 09/20/2023  2:29 PM by: Vicki Davis [[id number]]          Current Outpatient Medications   Medication Sig Dispense Refill    Continuous Blood Gluc Sensor (FREESTYLE TANYA 2 SENSOR) MISC Blood sugar checks before meals and at bedtime and as needed------ patient has hypoglycemic unawareness 1 each 11    Continuous Blood Gluc  (FREESTYLE TANYA 2 READER) MARY Blood sugar checks before meals and at bedtime and as needed------ patient has hypoglycemic unawareness 1 each 0    amLODIPine (NORVASC) 10 MG tablet TAKE 1 TABLET BY MOUTH EVERY DAY      benztropine (COGENTIN) 1 MG tablet TAKE 1 TABLET BY MOUTH TWICE A DAY      brimonidine (ALPHAGAN P) 0.15 % ophthalmic solution Apply 1 drop to eye 2 times daily      clopidogrel (PLAVIX) 75 MG tablet TAKE 1 TABLET BY MOUTH EVERY DAY      haloperidol (HALDOL) 2 MG tablet Take 2 tablets twice a day      insulin aspart (NOVOLOG FLEXPEN) 100 UNIT/ML injection pen INJECT 3 UNITS SUBCUTANEOUSLY BEFORE BREAKFAST AND LUNCH AND 2 UNITS BEFORE DINNER      Insulin Degludec (TRESIBA FLEXTOUCH) 100 UNIT/ML SOPN INJECT 16 UNITS UNDER THE SKIN ONCE DAILY      levothyroxine (SYNTHROID) 175 MCG tablet TAKE 1 TABLET BY MOUTH EVERY DAY      lidocaine (LIDODERM) 5 % Apply patch to the affected area for 12 hours a day and remove for 12 hours a day.       losartan-hydroCHLOROthiazide (HYZAAR) 100-25 MG per tablet TAKE 1 TABLET BY MOUTH EVERY DAY      metoprolol succinate (TOPROL XL) 25 MG extended release tablet TAKE 1 TABLET BY MOUTH EVERY DAY      pravastatin (PRAVACHOL) 80 MG tablet TAKE 1 TABLET BY MOUTH EVERY DAY      traZODone (DESYREL)

## 2023-09-21 LAB
ALBUMIN SERPL-MCNC: 3.4 G/DL (ref 3.5–5)
ALBUMIN/GLOB SERPL: 1 (ref 1.1–2.2)
ALP SERPL-CCNC: 142 U/L (ref 45–117)
ALT SERPL-CCNC: 87 U/L (ref 12–78)
ANION GAP SERPL CALC-SCNC: 8 MMOL/L (ref 5–15)
AST SERPL-CCNC: 47 U/L (ref 15–37)
BASOPHILS # BLD: 0 K/UL (ref 0–0.1)
BASOPHILS NFR BLD: 0 % (ref 0–1)
BILIRUB SERPL-MCNC: 0.2 MG/DL (ref 0.2–1)
BUN SERPL-MCNC: 28 MG/DL (ref 6–20)
BUN/CREAT SERPL: 25 (ref 12–20)
CALCIUM SERPL-MCNC: 8.4 MG/DL (ref 8.5–10.1)
CHLORIDE SERPL-SCNC: 102 MMOL/L (ref 97–108)
CHOLEST SERPL-MCNC: 140 MG/DL
CO2 SERPL-SCNC: 25 MMOL/L (ref 21–32)
COMMENT:: NORMAL
CREAT SERPL-MCNC: 1.12 MG/DL (ref 0.55–1.02)
CRP SERPL HS-MCNC: 0.6 MG/L
DIFFERENTIAL METHOD BLD: ABNORMAL
EOSINOPHIL # BLD: 0.1 K/UL (ref 0–0.4)
EOSINOPHIL NFR BLD: 2 % (ref 0–7)
ERYTHROCYTE [DISTWIDTH] IN BLOOD BY AUTOMATED COUNT: 12.6 % (ref 11.5–14.5)
EST. AVERAGE GLUCOSE BLD GHB EST-MCNC: 246 MG/DL
GLOBULIN SER CALC-MCNC: 3.3 G/DL (ref 2–4)
GLUCOSE SERPL-MCNC: 414 MG/DL (ref 65–100)
HBA1C MFR BLD: 10.2 % (ref 4–5.6)
HCT VFR BLD AUTO: 37.9 % (ref 35–47)
HDLC SERPL-MCNC: 87 MG/DL
HDLC SERPL: 1.6 (ref 0–5)
HGB BLD-MCNC: 12.9 G/DL (ref 11.5–16)
IMM GRANULOCYTES # BLD AUTO: 0 K/UL (ref 0–0.04)
IMM GRANULOCYTES NFR BLD AUTO: 1 % (ref 0–0.5)
LDLC SERPL CALC-MCNC: 41.2 MG/DL (ref 0–100)
LYMPHOCYTES # BLD: 1.1 K/UL (ref 0.8–3.5)
LYMPHOCYTES NFR BLD: 24 % (ref 12–49)
MCH RBC QN AUTO: 32.1 PG (ref 26–34)
MCHC RBC AUTO-ENTMCNC: 34 G/DL (ref 30–36.5)
MCV RBC AUTO: 94.3 FL (ref 80–99)
MONOCYTES # BLD: 0.4 K/UL (ref 0–1)
MONOCYTES NFR BLD: 9 % (ref 5–13)
NEUTS SEG # BLD: 2.8 K/UL (ref 1.8–8)
NEUTS SEG NFR BLD: 64 % (ref 32–75)
NRBC # BLD: 0 K/UL (ref 0–0.01)
NRBC BLD-RTO: 0 PER 100 WBC
PLATELET # BLD AUTO: 235 K/UL (ref 150–400)
PMV BLD AUTO: 10 FL (ref 8.9–12.9)
POTASSIUM SERPL-SCNC: 4.1 MMOL/L (ref 3.5–5.1)
PROT SERPL-MCNC: 6.7 G/DL (ref 6.4–8.2)
RBC # BLD AUTO: 4.02 M/UL (ref 3.8–5.2)
SODIUM SERPL-SCNC: 135 MMOL/L (ref 136–145)
SPECIMEN HOLD: NORMAL
TRIGL SERPL-MCNC: 59 MG/DL
TSH SERPL DL<=0.05 MIU/L-ACNC: 0.42 UIU/ML (ref 0.36–3.74)
VLDLC SERPL CALC-MCNC: 11.8 MG/DL
WBC # BLD AUTO: 4.4 K/UL (ref 3.6–11)

## 2023-09-21 NOTE — PROGRESS NOTES
1.  As far as her diabetes is concerned, I will attempt to get a continuous glucose monitor. This is the most practical thing for his patient who also has hypoglycemic unawareness. 2. The daughter wants to place her in appropriate setting for her progressive dementia. She is in the process of looking quite carefully for this. Additionally the daughter is in respite services. Her daughter's name is Tabby Fraser and her phone number is [phone]. I will attempt to assist her with this. 3. She remains on her statin because of increased cardiovascular risk. 4. Blood pressure is excellent. No adjustments are made. 5. She has post ablative hypothyroidism and efficacy and compliance will be assessed. 6. Her dementia is getting progressively worse. 7. Finally she does have a history of venous insufficiency which is reasonably stable.

## 2023-09-21 NOTE — PROGRESS NOTES
comes in for the first time in about a year. Her daughter brings in today. The patient is getting more confused, although she was reasonably pleasant and answered questions fairly appropriately. She has had slight reduction in her hearing acuity. The daughter would not check her sugars because the patient refuses. She ideally needs to have a continuous glucose monitor which I tried previously, but the insurance company would not pay for it. Complicating her diabetes is hypoglycemic unawareness. I currently do not have any idea how her diabetes is doing. Historically, hemoglobin A1cs have pretty much remained in the 9 range. She has a past history of primary hypertension, dyslipidemia, and hypothyroidism above and beyond her progressive dementia presumably neurovascular in origin.

## 2023-09-22 LAB — APO B SERPL-MCNC: 48 MG/DL

## 2023-09-27 DIAGNOSIS — I10 ESSENTIAL (PRIMARY) HYPERTENSION: ICD-10-CM

## 2023-09-28 RX ORDER — METOPROLOL SUCCINATE 25 MG/1
TABLET, EXTENDED RELEASE ORAL
Qty: 90 TABLET | Refills: 3 | Status: SHIPPED | OUTPATIENT
Start: 2023-09-28

## 2023-11-16 RX ORDER — AMLODIPINE BESYLATE 10 MG/1
TABLET ORAL
Qty: 90 TABLET | Refills: 3 | Status: SHIPPED | OUTPATIENT
Start: 2023-11-16

## 2023-12-08 RX ORDER — TRAZODONE HYDROCHLORIDE 50 MG/1
50 TABLET ORAL NIGHTLY
Qty: 90 TABLET | Refills: 3 | Status: SHIPPED | OUTPATIENT
Start: 2023-12-08

## 2024-01-02 DIAGNOSIS — E78.5 HYPERLIPIDEMIA, UNSPECIFIED: ICD-10-CM

## 2024-01-02 DIAGNOSIS — I10 ESSENTIAL (PRIMARY) HYPERTENSION: ICD-10-CM

## 2024-01-03 RX ORDER — PRAVASTATIN SODIUM 80 MG/1
TABLET ORAL
Qty: 90 TABLET | Refills: 3 | Status: SHIPPED | OUTPATIENT
Start: 2024-01-03

## 2024-01-03 RX ORDER — BENZTROPINE MESYLATE 1 MG/1
TABLET ORAL
Qty: 180 TABLET | Refills: 3 | Status: SHIPPED | OUTPATIENT
Start: 2024-01-03

## 2024-01-03 RX ORDER — CLOPIDOGREL BISULFATE 75 MG/1
TABLET ORAL
Qty: 90 TABLET | Refills: 3 | Status: SHIPPED | OUTPATIENT
Start: 2024-01-03

## 2024-01-31 RX ORDER — PEN NEEDLE, DIABETIC 32GX 5/32"
NEEDLE, DISPOSABLE MISCELLANEOUS
Qty: 100 EACH | Refills: 11 | Status: SHIPPED | OUTPATIENT
Start: 2024-01-31

## 2024-03-05 RX ORDER — LOSARTAN POTASSIUM AND HYDROCHLOROTHIAZIDE 25; 100 MG/1; MG/1
TABLET ORAL
Qty: 90 TABLET | Refills: 3 | Status: SHIPPED | OUTPATIENT
Start: 2024-03-05

## 2024-04-01 DIAGNOSIS — E03.2 HYPOTHYROIDISM DUE TO MEDICAMENTS AND OTHER EXOGENOUS SUBSTANCES: ICD-10-CM

## 2024-04-03 RX ORDER — LEVOTHYROXINE SODIUM 175 UG/1
TABLET ORAL
Qty: 90 TABLET | Refills: 3 | Status: SHIPPED | OUTPATIENT
Start: 2024-04-03

## 2024-07-02 RX ORDER — INSULIN DEGLUDEC 100 U/ML
INJECTION, SOLUTION SUBCUTANEOUS
Qty: 6 ADJUSTABLE DOSE PRE-FILLED PEN SYRINGE | Refills: 3 | Status: SHIPPED | OUTPATIENT
Start: 2024-07-02

## 2024-11-20 RX ORDER — PEN NEEDLE, DIABETIC 32GX 5/32"
NEEDLE, DISPOSABLE MISCELLANEOUS
Qty: 100 EACH | Refills: 4 | Status: SHIPPED | OUTPATIENT
Start: 2024-11-20

## 2024-12-05 DIAGNOSIS — E78.5 HYPERLIPIDEMIA, UNSPECIFIED: ICD-10-CM

## 2024-12-05 DIAGNOSIS — I10 ESSENTIAL (PRIMARY) HYPERTENSION: ICD-10-CM

## 2024-12-05 RX ORDER — CLOPIDOGREL BISULFATE 75 MG/1
TABLET ORAL
Qty: 90 TABLET | Refills: 3 | OUTPATIENT
Start: 2024-12-05

## 2024-12-05 RX ORDER — AMLODIPINE BESYLATE 10 MG/1
TABLET ORAL
Qty: 90 TABLET | Refills: 3 | OUTPATIENT
Start: 2024-12-05

## 2024-12-05 RX ORDER — METOPROLOL SUCCINATE 25 MG/1
TABLET, EXTENDED RELEASE ORAL
Qty: 90 TABLET | Refills: 3 | OUTPATIENT
Start: 2024-12-05

## 2024-12-05 RX ORDER — INSULIN ASPART 100 [IU]/ML
INJECTION, SOLUTION INTRAVENOUS; SUBCUTANEOUS
Refills: 11 | OUTPATIENT
Start: 2024-12-05

## 2024-12-05 RX ORDER — TRAZODONE HYDROCHLORIDE 50 MG/1
50 TABLET, FILM COATED ORAL NIGHTLY
Qty: 90 TABLET | Refills: 2 | OUTPATIENT
Start: 2024-12-05

## 2024-12-05 RX ORDER — BRIMONIDINE TARTRATE 1.5 MG/ML
SOLUTION/ DROPS OPHTHALMIC
Qty: 5 ML | Refills: 23 | OUTPATIENT
Start: 2024-12-05

## 2024-12-05 RX ORDER — PRAVASTATIN SODIUM 80 MG/1
TABLET ORAL
Qty: 90 TABLET | Refills: 3 | OUTPATIENT
Start: 2024-12-05

## 2024-12-05 RX ORDER — BENZTROPINE MESYLATE 1 MG/1
TABLET ORAL
Qty: 180 TABLET | Refills: 3 | OUTPATIENT
Start: 2024-12-05

## 2024-12-06 DIAGNOSIS — I10 ESSENTIAL (PRIMARY) HYPERTENSION: ICD-10-CM

## 2024-12-06 DIAGNOSIS — E78.5 HYPERLIPIDEMIA, UNSPECIFIED: ICD-10-CM

## 2024-12-06 DIAGNOSIS — E03.2 HYPOTHYROIDISM DUE TO MEDICAMENTS AND OTHER EXOGENOUS SUBSTANCES: ICD-10-CM

## 2024-12-08 RX ORDER — CLOPIDOGREL BISULFATE 75 MG/1
75 TABLET ORAL DAILY
Qty: 90 TABLET | Refills: 3 | Status: SHIPPED | OUTPATIENT
Start: 2024-12-08

## 2024-12-08 RX ORDER — LOSARTAN POTASSIUM AND HYDROCHLOROTHIAZIDE 25; 100 MG/1; MG/1
1 TABLET ORAL DAILY
Qty: 90 TABLET | Refills: 3 | Status: SHIPPED | OUTPATIENT
Start: 2024-12-08

## 2024-12-08 RX ORDER — TRAZODONE HYDROCHLORIDE 50 MG/1
50 TABLET, FILM COATED ORAL NIGHTLY
Qty: 90 TABLET | Refills: 3 | Status: SHIPPED | OUTPATIENT
Start: 2024-12-08

## 2024-12-08 RX ORDER — PEN NEEDLE, DIABETIC 32GX 5/32"
NEEDLE, DISPOSABLE MISCELLANEOUS
Qty: 100 EACH | Refills: 4 | Status: SHIPPED | OUTPATIENT
Start: 2024-12-08

## 2024-12-08 RX ORDER — AMLODIPINE BESYLATE 10 MG/1
10 TABLET ORAL DAILY
Qty: 90 TABLET | Refills: 3 | Status: SHIPPED | OUTPATIENT
Start: 2024-12-08

## 2024-12-08 RX ORDER — LEVOTHYROXINE SODIUM 175 UG/1
175 TABLET ORAL DAILY
Qty: 90 TABLET | Refills: 3 | Status: SHIPPED | OUTPATIENT
Start: 2024-12-08

## 2024-12-08 RX ORDER — METOPROLOL SUCCINATE 25 MG/1
25 TABLET, EXTENDED RELEASE ORAL DAILY
Qty: 90 TABLET | Refills: 3 | Status: SHIPPED | OUTPATIENT
Start: 2024-12-08

## 2024-12-08 RX ORDER — PRAVASTATIN SODIUM 80 MG/1
80 TABLET ORAL DAILY
Qty: 90 TABLET | Refills: 3 | Status: SHIPPED | OUTPATIENT
Start: 2024-12-08

## 2024-12-08 RX ORDER — BENZTROPINE MESYLATE 1 MG/1
1 TABLET ORAL 2 TIMES DAILY
Qty: 180 TABLET | Refills: 3 | Status: SHIPPED | OUTPATIENT
Start: 2024-12-08

## 2024-12-08 RX ORDER — INSULIN DEGLUDEC 100 U/ML
INJECTION, SOLUTION SUBCUTANEOUS
Qty: 6 ADJUSTABLE DOSE PRE-FILLED PEN SYRINGE | Refills: 3 | Status: SHIPPED | OUTPATIENT
Start: 2024-12-08

## 2025-01-01 ENCOUNTER — HOSPITAL ENCOUNTER (INPATIENT)
Facility: HOSPITAL | Age: 85
LOS: 10 days | Discharge: HOME | DRG: 193 | End: 2025-07-28
Attending: STUDENT IN AN ORGANIZED HEALTH CARE EDUCATION/TRAINING PROGRAM | Admitting: STUDENT IN AN ORGANIZED HEALTH CARE EDUCATION/TRAINING PROGRAM
Payer: MEDICARE

## 2025-01-01 ENCOUNTER — HOSPITAL ENCOUNTER (EMERGENCY)
Facility: HOSPITAL | Age: 85
End: 2025-07-31
Attending: STUDENT IN AN ORGANIZED HEALTH CARE EDUCATION/TRAINING PROGRAM
Payer: MEDICARE

## 2025-01-01 ENCOUNTER — APPOINTMENT (OUTPATIENT)
Facility: HOSPITAL | Age: 85
DRG: 193 | End: 2025-01-01
Payer: MEDICARE

## 2025-01-01 ENCOUNTER — APPOINTMENT (OUTPATIENT)
Facility: HOSPITAL | Age: 85
End: 2025-01-01
Payer: MEDICARE

## 2025-01-01 VITALS
SYSTOLIC BLOOD PRESSURE: 111 MMHG | RESPIRATION RATE: 16 BRPM | HEIGHT: 65 IN | BODY MASS INDEX: 21.04 KG/M2 | OXYGEN SATURATION: 98 % | DIASTOLIC BLOOD PRESSURE: 51 MMHG | TEMPERATURE: 99.1 F | HEART RATE: 68 BPM | WEIGHT: 126.3 LBS

## 2025-01-01 VITALS
SYSTOLIC BLOOD PRESSURE: 86 MMHG | DIASTOLIC BLOOD PRESSURE: 39 MMHG | HEIGHT: 65 IN | WEIGHT: 122.8 LBS | OXYGEN SATURATION: 100 % | RESPIRATION RATE: 24 BRPM | TEMPERATURE: 92.5 F | BODY MASS INDEX: 20.46 KG/M2

## 2025-01-01 DIAGNOSIS — R65.21 SEPTIC SHOCK (HCC): Primary | ICD-10-CM

## 2025-01-01 DIAGNOSIS — E16.2 HYPOGLYCEMIA: ICD-10-CM

## 2025-01-01 DIAGNOSIS — R73.9 HYPERGLYCEMIA: ICD-10-CM

## 2025-01-01 DIAGNOSIS — J18.9 MULTIFOCAL PNEUMONIA: Primary | ICD-10-CM

## 2025-01-01 DIAGNOSIS — A41.9 SEPTIC SHOCK (HCC): Primary | ICD-10-CM

## 2025-01-01 DIAGNOSIS — E10.65 HYPERGLYCEMIA DUE TO TYPE 1 DIABETES MELLITUS (HCC): ICD-10-CM

## 2025-01-01 LAB
ALBUMIN SERPL-MCNC: 2.5 G/DL (ref 3.5–5)
ALBUMIN SERPL-MCNC: 2.7 G/DL (ref 3.5–5)
ALBUMIN SERPL-MCNC: 2.9 G/DL (ref 3.5–5.2)
ALBUMIN SERPL-MCNC: 3.1 G/DL (ref 3.5–5)
ALBUMIN/GLOB SERPL: 0.8 (ref 1.1–2.2)
ALBUMIN/GLOB SERPL: 0.9 (ref 1.1–2.2)
ALP SERPL-CCNC: 120 U/L (ref 35–104)
ALP SERPL-CCNC: 67 U/L (ref 45–117)
ALP SERPL-CCNC: 68 U/L (ref 45–117)
ALP SERPL-CCNC: 88 U/L (ref 45–117)
ALT SERPL-CCNC: 17 U/L (ref 12–78)
ALT SERPL-CCNC: 17 U/L (ref 12–78)
ALT SERPL-CCNC: 23 U/L (ref 12–78)
ALT SERPL-CCNC: 47 U/L (ref 10–35)
ANION GAP BLD CALC-SCNC: ABNORMAL (ref 10–20)
ANION GAP SERPL CALC-SCNC: 10 MMOL/L (ref 2–12)
ANION GAP SERPL CALC-SCNC: 40 MMOL/L (ref 2–14)
ANION GAP SERPL CALC-SCNC: 5 MMOL/L (ref 2–12)
ANION GAP SERPL CALC-SCNC: 6 MMOL/L (ref 2–12)
ANION GAP SERPL CALC-SCNC: 7 MMOL/L (ref 2–12)
ANION GAP SERPL CALC-SCNC: 8 MMOL/L (ref 2–12)
APPEARANCE UR: CLEAR
APPEARANCE UR: CLEAR
AST SERPL-CCNC: 22 U/L (ref 15–37)
AST SERPL-CCNC: 23 U/L (ref 15–37)
AST SERPL-CCNC: 26 U/L (ref 15–37)
AST SERPL-CCNC: 35 U/L (ref 10–35)
BACTERIA URNS QL MICRO: ABNORMAL /HPF
BACTERIA URNS QL MICRO: NEGATIVE /HPF
BASOPHILS # BLD: 0 K/UL (ref 0–0.1)
BASOPHILS # BLD: 0.01 K/UL (ref 0–0.1)
BASOPHILS # BLD: 0.02 K/UL (ref 0–0.1)
BASOPHILS # BLD: 0.03 K/UL (ref 0–0.1)
BASOPHILS NFR BLD: 0 % (ref 0–1)
BASOPHILS NFR BLD: 0.2 % (ref 0–1)
BASOPHILS NFR BLD: 0.3 % (ref 0–1)
BASOPHILS NFR BLD: 0.3 % (ref 0–1)
BASOPHILS NFR BLD: 0.5 % (ref 0–1)
BASOPHILS NFR BLD: 0.5 % (ref 0–1)
BASOPHILS NFR BLD: 0.6 % (ref 0–1)
BASOPHILS NFR BLD: 0.6 % (ref 0–1)
BASOPHILS NFR BLD: 0.8 % (ref 0–1)
BILIRUB SERPL-MCNC: 0.3 MG/DL (ref 0.2–1)
BILIRUB SERPL-MCNC: 0.5 MG/DL (ref 0.2–1)
BILIRUB SERPL-MCNC: 0.6 MG/DL (ref 0.2–1)
BILIRUB SERPL-MCNC: <0.2 MG/DL (ref 0–1.2)
BILIRUB UR QL: NEGATIVE
BILIRUB UR QL: NEGATIVE
BUN SERPL-MCNC: 11 MG/DL (ref 6–20)
BUN SERPL-MCNC: 11 MG/DL (ref 6–20)
BUN SERPL-MCNC: 13 MG/DL (ref 6–20)
BUN SERPL-MCNC: 15 MG/DL (ref 6–20)
BUN SERPL-MCNC: 16 MG/DL (ref 6–20)
BUN SERPL-MCNC: 22 MG/DL (ref 6–20)
BUN SERPL-MCNC: 48 MG/DL (ref 8–23)
BUN/CREAT SERPL: 18 (ref 12–20)
BUN/CREAT SERPL: 19 (ref 12–20)
BUN/CREAT SERPL: 20 (ref 12–20)
BUN/CREAT SERPL: 21 (ref 12–20)
BUN/CREAT SERPL: 22 (ref 12–20)
BUN/CREAT SERPL: 23 (ref 12–20)
BUN/CREAT SERPL: 24 (ref 12–20)
BUN/CREAT SERPL: 24 (ref 12–20)
BUN/CREAT SERPL: 27 (ref 12–20)
CA-I BLD-MCNC: 1.06 MMOL/L (ref 1.15–1.33)
CALCIUM SERPL-MCNC: 8.1 MG/DL (ref 8.5–10.1)
CALCIUM SERPL-MCNC: 8.5 MG/DL (ref 8.5–10.1)
CALCIUM SERPL-MCNC: 8.7 MG/DL (ref 8.5–10.1)
CALCIUM SERPL-MCNC: 8.7 MG/DL (ref 8.5–10.1)
CALCIUM SERPL-MCNC: 8.8 MG/DL (ref 8.5–10.1)
CALCIUM SERPL-MCNC: 8.9 MG/DL (ref 8.5–10.1)
CALCIUM SERPL-MCNC: 8.9 MG/DL (ref 8.5–10.1)
CALCIUM SERPL-MCNC: 9 MG/DL (ref 8.5–10.1)
CALCIUM SERPL-MCNC: 9 MG/DL (ref 8.8–10.2)
CHLORIDE BLD-SCNC: 122 MMOL/L (ref 100–111)
CHLORIDE SERPL-SCNC: 100 MMOL/L (ref 97–108)
CHLORIDE SERPL-SCNC: 101 MMOL/L (ref 97–108)
CHLORIDE SERPL-SCNC: 102 MMOL/L (ref 97–108)
CHLORIDE SERPL-SCNC: 105 MMOL/L (ref 97–108)
CHLORIDE SERPL-SCNC: 97 MMOL/L (ref 98–107)
CO2 BLD-SCNC: <5 MMOL/L (ref 22–29)
CO2 SERPL-SCNC: 24 MMOL/L (ref 21–32)
CO2 SERPL-SCNC: 26 MMOL/L (ref 21–32)
CO2 SERPL-SCNC: 28 MMOL/L (ref 21–32)
CO2 SERPL-SCNC: 29 MMOL/L (ref 21–32)
CO2 SERPL-SCNC: 3 MMOL/L (ref 20–29)
COLOR UR: ABNORMAL
COLOR UR: ABNORMAL
COMMENT:: NORMAL
CREAT SERPL-MCNC: 0.57 MG/DL (ref 0.55–1.02)
CREAT SERPL-MCNC: 0.61 MG/DL (ref 0.55–1.02)
CREAT SERPL-MCNC: 0.62 MG/DL (ref 0.55–1.02)
CREAT SERPL-MCNC: 0.64 MG/DL (ref 0.55–1.02)
CREAT SERPL-MCNC: 0.66 MG/DL (ref 0.55–1.02)
CREAT SERPL-MCNC: 0.68 MG/DL (ref 0.55–1.02)
CREAT SERPL-MCNC: 0.72 MG/DL (ref 0.55–1.02)
CREAT SERPL-MCNC: 0.82 MG/DL (ref 0.55–1.02)
CREAT SERPL-MCNC: 2.39 MG/DL (ref 0.6–1)
CREAT UR-MCNC: 1.5 MG/DL (ref 0.6–1.3)
CRP SERPL-MCNC: 1.72 MG/DL (ref 0–0.3)
DIFFERENTIAL METHOD BLD: ABNORMAL
DIFFERENTIAL METHOD BLD: NORMAL
EKG ATRIAL RATE: 63 BPM
EKG ATRIAL RATE: 86 BPM
EKG DIAGNOSIS: NORMAL
EKG DIAGNOSIS: NORMAL
EKG P AXIS: 58 DEGREES
EKG P AXIS: 65 DEGREES
EKG P-R INTERVAL: 160 MS
EKG P-R INTERVAL: 160 MS
EKG Q-T INTERVAL: 348 MS
EKG Q-T INTERVAL: 434 MS
EKG QRS DURATION: 82 MS
EKG QRS DURATION: 94 MS
EKG QTC CALCULATION (BAZETT): 416 MS
EKG QTC CALCULATION (BAZETT): 444 MS
EKG R AXIS: -31 DEGREES
EKG R AXIS: -47 DEGREES
EKG T AXIS: -1 DEGREES
EKG T AXIS: 23 DEGREES
EKG VENTRICULAR RATE: 63 BPM
EKG VENTRICULAR RATE: 86 BPM
EOSINOPHIL # BLD: 0.04 K/UL (ref 0–0.4)
EOSINOPHIL # BLD: 0.07 K/UL (ref 0–0.4)
EOSINOPHIL # BLD: 0.09 K/UL (ref 0–0.4)
EOSINOPHIL # BLD: 0.09 K/UL (ref 0–0.4)
EOSINOPHIL # BLD: 0.12 K/UL (ref 0–0.4)
EOSINOPHIL # BLD: 0.13 K/UL (ref 0–0.4)
EOSINOPHIL # BLD: 0.19 K/UL (ref 0–0.4)
EOSINOPHIL NFR BLD: 0.8 % (ref 0–7)
EOSINOPHIL NFR BLD: 1 % (ref 0–7)
EOSINOPHIL NFR BLD: 1.9 % (ref 0–7)
EOSINOPHIL NFR BLD: 2.4 % (ref 0–7)
EOSINOPHIL NFR BLD: 2.6 % (ref 0–7)
EOSINOPHIL NFR BLD: 3.2 % (ref 0–7)
EOSINOPHIL NFR BLD: 3.3 % (ref 0–7)
EOSINOPHIL NFR BLD: 3.6 % (ref 0–7)
EOSINOPHIL NFR BLD: 3.7 % (ref 0–7)
EPITH CASTS URNS QL MICRO: ABNORMAL /LPF
EPITH CASTS URNS QL MICRO: ABNORMAL /LPF
ERYTHROCYTE [DISTWIDTH] IN BLOOD BY AUTOMATED COUNT: 12.8 % (ref 11.5–14.5)
ERYTHROCYTE [DISTWIDTH] IN BLOOD BY AUTOMATED COUNT: 12.9 % (ref 11.5–14.5)
ERYTHROCYTE [DISTWIDTH] IN BLOOD BY AUTOMATED COUNT: 13 % (ref 11.5–14.5)
ERYTHROCYTE [DISTWIDTH] IN BLOOD BY AUTOMATED COUNT: 13 % (ref 11.5–14.5)
ERYTHROCYTE [DISTWIDTH] IN BLOOD BY AUTOMATED COUNT: 13.2 % (ref 11.5–14.5)
ERYTHROCYTE [DISTWIDTH] IN BLOOD BY AUTOMATED COUNT: 13.3 % (ref 11.5–14.5)
ERYTHROCYTE [DISTWIDTH] IN BLOOD BY AUTOMATED COUNT: 13.3 % (ref 11.5–14.5)
ERYTHROCYTE [DISTWIDTH] IN BLOOD BY AUTOMATED COUNT: 13.5 % (ref 11.5–14.5)
ERYTHROCYTE [DISTWIDTH] IN BLOOD BY AUTOMATED COUNT: 14.4 % (ref 11.5–14.5)
EST. AVERAGE GLUCOSE BLD GHB EST-MCNC: 246 MG/DL
EST. AVERAGE GLUCOSE BLD GHB EST-MCNC: 249 MG/DL
ETHANOL SERPL-MCNC: <10 MG/DL (ref 0–0.08)
GLOBULIN SER CALC-MCNC: 3.1 G/DL (ref 2–4)
GLOBULIN SER CALC-MCNC: 3.3 G/DL (ref 2–4)
GLOBULIN SER CALC-MCNC: 3.3 G/DL (ref 2–4)
GLOBULIN SER CALC-MCNC: 3.9 G/DL (ref 2–4)
GLUCOSE BLD STRIP.AUTO-MCNC: 109 MG/DL (ref 65–117)
GLUCOSE BLD STRIP.AUTO-MCNC: 114 MG/DL (ref 65–117)
GLUCOSE BLD STRIP.AUTO-MCNC: 117 MG/DL (ref 65–117)
GLUCOSE BLD STRIP.AUTO-MCNC: 126 MG/DL (ref 65–117)
GLUCOSE BLD STRIP.AUTO-MCNC: 132 MG/DL (ref 65–117)
GLUCOSE BLD STRIP.AUTO-MCNC: 140 MG/DL (ref 65–117)
GLUCOSE BLD STRIP.AUTO-MCNC: 142 MG/DL (ref 65–117)
GLUCOSE BLD STRIP.AUTO-MCNC: 149 MG/DL (ref 65–117)
GLUCOSE BLD STRIP.AUTO-MCNC: 151 MG/DL (ref 65–117)
GLUCOSE BLD STRIP.AUTO-MCNC: 162 MG/DL (ref 65–117)
GLUCOSE BLD STRIP.AUTO-MCNC: 175 MG/DL (ref 65–117)
GLUCOSE BLD STRIP.AUTO-MCNC: 184 MG/DL (ref 65–117)
GLUCOSE BLD STRIP.AUTO-MCNC: 187 MG/DL (ref 65–117)
GLUCOSE BLD STRIP.AUTO-MCNC: 193 MG/DL (ref 65–117)
GLUCOSE BLD STRIP.AUTO-MCNC: 202 MG/DL (ref 65–117)
GLUCOSE BLD STRIP.AUTO-MCNC: 204 MG/DL (ref 65–117)
GLUCOSE BLD STRIP.AUTO-MCNC: 228 MG/DL (ref 65–117)
GLUCOSE BLD STRIP.AUTO-MCNC: 233 MG/DL (ref 65–117)
GLUCOSE BLD STRIP.AUTO-MCNC: 238 MG/DL (ref 65–117)
GLUCOSE BLD STRIP.AUTO-MCNC: 254 MG/DL (ref 65–117)
GLUCOSE BLD STRIP.AUTO-MCNC: 264 MG/DL (ref 65–117)
GLUCOSE BLD STRIP.AUTO-MCNC: 266 MG/DL (ref 65–117)
GLUCOSE BLD STRIP.AUTO-MCNC: 276 MG/DL (ref 65–117)
GLUCOSE BLD STRIP.AUTO-MCNC: 276 MG/DL (ref 65–117)
GLUCOSE BLD STRIP.AUTO-MCNC: 286 MG/DL (ref 65–117)
GLUCOSE BLD STRIP.AUTO-MCNC: 287 MG/DL (ref 65–117)
GLUCOSE BLD STRIP.AUTO-MCNC: 292 MG/DL (ref 65–117)
GLUCOSE BLD STRIP.AUTO-MCNC: 294 MG/DL (ref 65–117)
GLUCOSE BLD STRIP.AUTO-MCNC: 297 MG/DL (ref 65–117)
GLUCOSE BLD STRIP.AUTO-MCNC: 300 MG/DL (ref 65–117)
GLUCOSE BLD STRIP.AUTO-MCNC: 301 MG/DL (ref 65–117)
GLUCOSE BLD STRIP.AUTO-MCNC: 306 MG/DL (ref 65–117)
GLUCOSE BLD STRIP.AUTO-MCNC: 310 MG/DL (ref 65–117)
GLUCOSE BLD STRIP.AUTO-MCNC: 318 MG/DL (ref 65–117)
GLUCOSE BLD STRIP.AUTO-MCNC: 350 MG/DL (ref 65–117)
GLUCOSE BLD STRIP.AUTO-MCNC: 351 MG/DL (ref 65–117)
GLUCOSE BLD STRIP.AUTO-MCNC: 402 MG/DL (ref 65–117)
GLUCOSE BLD STRIP.AUTO-MCNC: 408 MG/DL (ref 65–117)
GLUCOSE BLD STRIP.AUTO-MCNC: 42 MG/DL (ref 65–117)
GLUCOSE BLD STRIP.AUTO-MCNC: 42 MG/DL (ref 65–117)
GLUCOSE BLD STRIP.AUTO-MCNC: 442 MG/DL (ref 65–117)
GLUCOSE BLD STRIP.AUTO-MCNC: 52 MG/DL (ref 65–117)
GLUCOSE BLD STRIP.AUTO-MCNC: 53 MG/DL (ref 65–117)
GLUCOSE BLD STRIP.AUTO-MCNC: 62 MG/DL (ref 65–117)
GLUCOSE BLD STRIP.AUTO-MCNC: 64 MG/DL (ref 65–117)
GLUCOSE BLD STRIP.AUTO-MCNC: 66 MG/DL (ref 65–117)
GLUCOSE BLD STRIP.AUTO-MCNC: 66 MG/DL (ref 65–117)
GLUCOSE BLD STRIP.AUTO-MCNC: 68 MG/DL (ref 65–117)
GLUCOSE BLD STRIP.AUTO-MCNC: 72 MG/DL (ref 65–117)
GLUCOSE BLD STRIP.AUTO-MCNC: 78 MG/DL (ref 65–117)
GLUCOSE BLD STRIP.AUTO-MCNC: 79 MG/DL (ref 65–117)
GLUCOSE BLD STRIP.AUTO-MCNC: 85 MG/DL (ref 65–117)
GLUCOSE BLD STRIP.AUTO-MCNC: 87 MG/DL (ref 65–117)
GLUCOSE BLD STRIP.AUTO-MCNC: 91 MG/DL (ref 65–117)
GLUCOSE BLD STRIP.AUTO-MCNC: 93 MG/DL (ref 65–117)
GLUCOSE BLD STRIP.AUTO-MCNC: 97 MG/DL (ref 65–117)
GLUCOSE BLD STRIP.AUTO-MCNC: 98 MG/DL (ref 65–117)
GLUCOSE BLD STRIP.AUTO-MCNC: >600 MG/DL (ref 65–117)
GLUCOSE BLD STRIP.AUTO-MCNC: >600 MG/DL (ref 65–117)
GLUCOSE BLD STRIP.AUTO-MCNC: >700 MG/DL (ref 74–99)
GLUCOSE SERPL-MCNC: 108 MG/DL (ref 65–100)
GLUCOSE SERPL-MCNC: 247 MG/DL (ref 65–100)
GLUCOSE SERPL-MCNC: 265 MG/DL (ref 65–100)
GLUCOSE SERPL-MCNC: 36 MG/DL (ref 65–100)
GLUCOSE SERPL-MCNC: 47 MG/DL (ref 65–100)
GLUCOSE SERPL-MCNC: 54 MG/DL (ref 65–100)
GLUCOSE SERPL-MCNC: 70 MG/DL (ref 65–100)
GLUCOSE SERPL-MCNC: 92 MG/DL (ref 65–100)
GLUCOSE SERPL-MCNC: 997 MG/DL (ref 65–100)
GLUCOSE UR STRIP.AUTO-MCNC: 100 MG/DL
GLUCOSE UR STRIP.AUTO-MCNC: >1000 MG/DL
HBA1C MFR BLD: 10.2 % (ref 4–5.6)
HBA1C MFR BLD: 10.3 % (ref 4–5.6)
HCO3 BLDA-SCNC: 3 MMOL/L
HCT VFR BLD AUTO: 34.4 % (ref 35–47)
HCT VFR BLD AUTO: 34.7 % (ref 35–47)
HCT VFR BLD AUTO: 35.8 % (ref 35–47)
HCT VFR BLD AUTO: 36 % (ref 35–47)
HCT VFR BLD AUTO: 36.9 % (ref 35–47)
HCT VFR BLD AUTO: 37.8 % (ref 35–47)
HCT VFR BLD AUTO: 38.3 % (ref 35–47)
HCT VFR BLD AUTO: 38.6 % (ref 35–47)
HCT VFR BLD AUTO: 38.9 % (ref 35–47)
HGB BLD-MCNC: 10.6 G/DL (ref 11.5–16)
HGB BLD-MCNC: 11.5 G/DL (ref 11.5–16)
HGB BLD-MCNC: 11.8 G/DL (ref 11.5–16)
HGB BLD-MCNC: 12 G/DL (ref 11.5–16)
HGB BLD-MCNC: 12.4 G/DL (ref 11.5–16)
HGB BLD-MCNC: 12.7 G/DL (ref 11.5–16)
HGB BLD-MCNC: 12.7 G/DL (ref 11.5–16)
HGB BLD-MCNC: 12.8 G/DL (ref 11.5–16)
HGB BLD-MCNC: 13.2 G/DL (ref 11.5–16)
HGB UR QL STRIP: ABNORMAL
HGB UR QL STRIP: NEGATIVE
HYALINE CASTS URNS QL MICRO: ABNORMAL /LPF (ref 0–2)
IMM GRANULOCYTES # BLD AUTO: 0 K/UL (ref 0–0.04)
IMM GRANULOCYTES # BLD AUTO: 0.01 K/UL (ref 0–0.04)
IMM GRANULOCYTES # BLD AUTO: 0.02 K/UL (ref 0–0.04)
IMM GRANULOCYTES NFR BLD AUTO: 0 % (ref 0–0.5)
IMM GRANULOCYTES NFR BLD AUTO: 0.2 % (ref 0–0.5)
IMM GRANULOCYTES NFR BLD AUTO: 0.2 % (ref 0–0.5)
IMM GRANULOCYTES NFR BLD AUTO: 0.3 % (ref 0–0.5)
IMM GRANULOCYTES NFR BLD AUTO: 0.5 % (ref 0–0.5)
IMM GRANULOCYTES NFR BLD AUTO: 0.5 % (ref 0–0.5)
IMM GRANULOCYTES NFR BLD AUTO: 0.6 % (ref 0–0.5)
KETONES UR QL STRIP.AUTO: 80 MG/DL
KETONES UR QL STRIP.AUTO: NEGATIVE MG/DL
LACTATE BLD-SCNC: 6.14 MMOL/L (ref 0.4–2)
LEUKOCYTE ESTERASE UR QL STRIP.AUTO: ABNORMAL
LEUKOCYTE ESTERASE UR QL STRIP.AUTO: NEGATIVE
LYMPHOCYTES # BLD: 0.8 K/UL (ref 0.8–3.5)
LYMPHOCYTES # BLD: 0.83 K/UL (ref 0.8–3.5)
LYMPHOCYTES # BLD: 0.86 K/UL (ref 0.8–3.5)
LYMPHOCYTES # BLD: 1.08 K/UL (ref 0.8–3.5)
LYMPHOCYTES # BLD: 1.09 K/UL (ref 0.8–3.5)
LYMPHOCYTES # BLD: 1.1 K/UL (ref 0.8–3.5)
LYMPHOCYTES # BLD: 1.28 K/UL (ref 0.8–3.5)
LYMPHOCYTES # BLD: 1.31 K/UL (ref 0.8–3.5)
LYMPHOCYTES # BLD: 2.07 K/UL (ref 0.8–3.5)
LYMPHOCYTES NFR BLD: 11 % (ref 12–49)
LYMPHOCYTES NFR BLD: 15.8 % (ref 12–49)
LYMPHOCYTES NFR BLD: 22.1 % (ref 12–49)
LYMPHOCYTES NFR BLD: 23.2 % (ref 12–49)
LYMPHOCYTES NFR BLD: 27.2 % (ref 12–49)
LYMPHOCYTES NFR BLD: 31.6 % (ref 12–49)
LYMPHOCYTES NFR BLD: 32.2 % (ref 12–49)
LYMPHOCYTES NFR BLD: 34.1 % (ref 12–49)
LYMPHOCYTES NFR BLD: 36.5 % (ref 12–49)
MAGNESIUM SERPL-MCNC: 1.8 MG/DL (ref 1.6–2.4)
MAGNESIUM SERPL-MCNC: 2 MG/DL (ref 1.6–2.4)
MAGNESIUM SERPL-MCNC: 2.6 MG/DL (ref 1.6–2.4)
MCH RBC QN AUTO: 30.5 PG (ref 26–34)
MCH RBC QN AUTO: 30.8 PG (ref 26–34)
MCH RBC QN AUTO: 31 PG (ref 26–34)
MCH RBC QN AUTO: 31 PG (ref 26–34)
MCH RBC QN AUTO: 31.2 PG (ref 26–34)
MCH RBC QN AUTO: 31.3 PG (ref 26–34)
MCH RBC QN AUTO: 31.6 PG (ref 26–34)
MCHC RBC AUTO-ENTMCNC: 29.6 G/DL (ref 30–36.5)
MCHC RBC AUTO-ENTMCNC: 33.2 G/DL (ref 30–36.5)
MCHC RBC AUTO-ENTMCNC: 33.2 G/DL (ref 30–36.5)
MCHC RBC AUTO-ENTMCNC: 33.3 G/DL (ref 30–36.5)
MCHC RBC AUTO-ENTMCNC: 33.4 G/DL (ref 30–36.5)
MCHC RBC AUTO-ENTMCNC: 33.6 G/DL (ref 30–36.5)
MCHC RBC AUTO-ENTMCNC: 33.6 G/DL (ref 30–36.5)
MCHC RBC AUTO-ENTMCNC: 33.9 G/DL (ref 30–36.5)
MCHC RBC AUTO-ENTMCNC: 34 G/DL (ref 30–36.5)
MCV RBC AUTO: 105.6 FL (ref 80–99)
MCV RBC AUTO: 91.5 FL (ref 80–99)
MCV RBC AUTO: 91.8 FL (ref 80–99)
MCV RBC AUTO: 91.8 FL (ref 80–99)
MCV RBC AUTO: 92 FL (ref 80–99)
MCV RBC AUTO: 92.5 FL (ref 80–99)
MCV RBC AUTO: 92.7 FL (ref 80–99)
MCV RBC AUTO: 92.8 FL (ref 80–99)
MCV RBC AUTO: 93.5 FL (ref 80–99)
METAMYELOCYTES NFR BLD MANUAL: 1 %
MONOCYTES # BLD: 0.45 K/UL (ref 0–1)
MONOCYTES # BLD: 0.46 K/UL (ref 0–1)
MONOCYTES # BLD: 0.49 K/UL (ref 0–1)
MONOCYTES # BLD: 0.51 K/UL (ref 0–1)
MONOCYTES # BLD: 0.54 K/UL (ref 0–1)
MONOCYTES # BLD: 0.58 K/UL (ref 0–1)
MONOCYTES # BLD: 0.61 K/UL (ref 0–1)
MONOCYTES # BLD: 0.82 K/UL (ref 0–1)
MONOCYTES # BLD: 1.5 K/UL (ref 0–1)
MONOCYTES NFR BLD: 12.4 % (ref 5–13)
MONOCYTES NFR BLD: 12.7 % (ref 5–13)
MONOCYTES NFR BLD: 13 % (ref 5–13)
MONOCYTES NFR BLD: 13.6 % (ref 5–13)
MONOCYTES NFR BLD: 13.9 % (ref 5–13)
MONOCYTES NFR BLD: 16.2 % (ref 5–13)
MONOCYTES NFR BLD: 16.2 % (ref 5–13)
MONOCYTES NFR BLD: 17.8 % (ref 5–13)
MONOCYTES NFR BLD: 8 % (ref 5–13)
NEUTS BAND NFR BLD MANUAL: 8 %
NEUTS SEG # BLD: 1.53 K/UL (ref 1.8–8)
NEUTS SEG # BLD: 1.54 K/UL (ref 1.8–8)
NEUTS SEG # BLD: 1.61 K/UL (ref 1.8–8)
NEUTS SEG # BLD: 1.99 K/UL (ref 1.8–8)
NEUTS SEG # BLD: 14.85 K/UL (ref 1.8–8)
NEUTS SEG # BLD: 2.25 K/UL (ref 1.8–8)
NEUTS SEG # BLD: 2.27 K/UL (ref 1.8–8)
NEUTS SEG # BLD: 2.32 K/UL (ref 1.8–8)
NEUTS SEG # BLD: 3.37 K/UL (ref 1.8–8)
NEUTS SEG NFR BLD: 42.8 % (ref 32–75)
NEUTS SEG NFR BLD: 47.1 % (ref 32–75)
NEUTS SEG NFR BLD: 47.4 % (ref 32–75)
NEUTS SEG NFR BLD: 49.8 % (ref 32–75)
NEUTS SEG NFR BLD: 56.2 % (ref 32–75)
NEUTS SEG NFR BLD: 61.5 % (ref 32–75)
NEUTS SEG NFR BLD: 61.7 % (ref 32–75)
NEUTS SEG NFR BLD: 66.8 % (ref 32–75)
NEUTS SEG NFR BLD: 71 % (ref 32–75)
NITRITE UR QL STRIP.AUTO: NEGATIVE
NITRITE UR QL STRIP.AUTO: NEGATIVE
NRBC # BLD: 0 K/UL (ref 0–0.01)
NRBC BLD-RTO: 0 PER 100 WBC
NT PRO BNP: 1338 PG/ML (ref 0–450)
PCO2 BLDV: 16.4 MMHG (ref 41–51)
PH BLDV: 6.83 (ref 7.32–7.42)
PH UR STRIP: 5 (ref 5–8)
PH UR STRIP: 6.5 (ref 5–8)
PHOSPHATE SERPL-MCNC: 11.4 MG/DL (ref 2.5–4.5)
PHOSPHATE SERPL-MCNC: 3.1 MG/DL (ref 2.6–4.7)
PHOSPHATE SERPL-MCNC: 3.4 MG/DL (ref 2.6–4.7)
PLATELET # BLD AUTO: 210 K/UL (ref 150–400)
PLATELET # BLD AUTO: 217 K/UL (ref 150–400)
PLATELET # BLD AUTO: 233 K/UL (ref 150–400)
PLATELET # BLD AUTO: 234 K/UL (ref 150–400)
PLATELET # BLD AUTO: 240 K/UL (ref 150–400)
PLATELET # BLD AUTO: 240 K/UL (ref 150–400)
PLATELET # BLD AUTO: 242 K/UL (ref 150–400)
PLATELET # BLD AUTO: 242 K/UL (ref 150–400)
PLATELET # BLD AUTO: 285 K/UL (ref 150–400)
PMV BLD AUTO: 10.5 FL (ref 8.9–12.9)
PMV BLD AUTO: 9.2 FL (ref 8.9–12.9)
PMV BLD AUTO: 9.2 FL (ref 8.9–12.9)
PMV BLD AUTO: 9.3 FL (ref 8.9–12.9)
PMV BLD AUTO: 9.4 FL (ref 8.9–12.9)
PMV BLD AUTO: 9.6 FL (ref 8.9–12.9)
PMV BLD AUTO: 9.6 FL (ref 8.9–12.9)
PMV BLD AUTO: 9.7 FL (ref 8.9–12.9)
PMV BLD AUTO: 9.7 FL (ref 8.9–12.9)
PO2 BLDV: 81 MMHG (ref 25–40)
POTASSIUM BLD-SCNC: 5 MMOL/L (ref 3.5–5.5)
POTASSIUM SERPL-SCNC: 3.4 MMOL/L (ref 3.5–5.1)
POTASSIUM SERPL-SCNC: 3.6 MMOL/L (ref 3.5–5.1)
POTASSIUM SERPL-SCNC: 3.6 MMOL/L (ref 3.5–5.1)
POTASSIUM SERPL-SCNC: 3.7 MMOL/L (ref 3.5–5.1)
POTASSIUM SERPL-SCNC: 3.8 MMOL/L (ref 3.5–5.1)
POTASSIUM SERPL-SCNC: 3.8 MMOL/L (ref 3.5–5.1)
POTASSIUM SERPL-SCNC: 3.9 MMOL/L (ref 3.5–5.1)
POTASSIUM SERPL-SCNC: 4.4 MMOL/L (ref 3.5–5.1)
POTASSIUM SERPL-SCNC: 5.4 MMOL/L (ref 3.5–5.1)
POTASSIUM SERPL-SCNC: 5.9 MMOL/L (ref 3.5–5.1)
PROCALCITONIN SERPL-MCNC: <0.05 NG/ML
PROT SERPL-MCNC: 5.8 G/DL (ref 6.4–8.2)
PROT SERPL-MCNC: 6 G/DL (ref 6.4–8.2)
PROT SERPL-MCNC: 6 G/DL (ref 6.4–8.3)
PROT SERPL-MCNC: 7 G/DL (ref 6.4–8.2)
PROT UR STRIP-MCNC: 30 MG/DL
PROT UR STRIP-MCNC: NEGATIVE MG/DL
RBC # BLD AUTO: 3.39 M/UL (ref 3.8–5.2)
RBC # BLD AUTO: 3.71 M/UL (ref 3.8–5.2)
RBC # BLD AUTO: 3.74 M/UL (ref 3.8–5.2)
RBC # BLD AUTO: 3.89 M/UL (ref 3.8–5.2)
RBC # BLD AUTO: 4.02 M/UL (ref 3.8–5.2)
RBC # BLD AUTO: 4.13 M/UL (ref 3.8–5.2)
RBC # BLD AUTO: 4.13 M/UL (ref 3.8–5.2)
RBC # BLD AUTO: 4.17 M/UL (ref 3.8–5.2)
RBC # BLD AUTO: 4.23 M/UL (ref 3.8–5.2)
RBC #/AREA URNS HPF: ABNORMAL /HPF (ref 0–5)
RBC #/AREA URNS HPF: ABNORMAL /HPF (ref 0–5)
RBC MORPH BLD: ABNORMAL
RBC MORPH BLD: ABNORMAL
SAO2 % BLD: 82 % (ref 94–98)
SERVICE CMNT-IMP: ABNORMAL
SERVICE CMNT-IMP: NORMAL
SODIUM BLD-SCNC: 140 MMOL/L (ref 136–145)
SODIUM SERPL-SCNC: 134 MMOL/L (ref 136–145)
SODIUM SERPL-SCNC: 135 MMOL/L (ref 136–145)
SODIUM SERPL-SCNC: 136 MMOL/L (ref 136–145)
SODIUM SERPL-SCNC: 136 MMOL/L (ref 136–145)
SODIUM SERPL-SCNC: 137 MMOL/L (ref 136–145)
SODIUM SERPL-SCNC: 138 MMOL/L (ref 136–145)
SODIUM SERPL-SCNC: 140 MMOL/L (ref 136–145)
SP GR UR REFRACTOMETRY: 1.02
SP GR UR REFRACTOMETRY: 1.02
SPECIMEN HOLD: NORMAL
SPECIMEN SITE: ABNORMAL
TROPONIN T SERPL HS-MCNC: 63.1 NG/L (ref 0–14)
TSH SERPL DL<=0.05 MIU/L-ACNC: 0.43 UIU/ML (ref 0.36–3.74)
TSH, 3RD GENERATION: 0.21 UIU/ML (ref 0.27–4.2)
URINE CULTURE IF INDICATED: ABNORMAL
URINE CULTURE IF INDICATED: ABNORMAL
UROBILINOGEN UR QL STRIP.AUTO: 0.2 EU/DL (ref 0.2–1)
UROBILINOGEN UR QL STRIP.AUTO: 0.2 EU/DL (ref 0.2–1)
WBC # BLD AUTO: 18.8 K/UL (ref 3.6–11)
WBC # BLD AUTO: 3.2 K/UL (ref 3.6–11)
WBC # BLD AUTO: 3.4 K/UL (ref 3.6–11)
WBC # BLD AUTO: 3.6 K/UL (ref 3.6–11)
WBC # BLD AUTO: 3.7 K/UL (ref 3.6–11)
WBC # BLD AUTO: 3.8 K/UL (ref 3.6–11)
WBC # BLD AUTO: 4 K/UL (ref 3.6–11)
WBC # BLD AUTO: 4 K/UL (ref 3.6–11)
WBC # BLD AUTO: 5.1 K/UL (ref 3.6–11)
WBC URNS QL MICRO: ABNORMAL /HPF (ref 0–4)
WBC URNS QL MICRO: ABNORMAL /HPF (ref 0–4)

## 2025-01-01 PROCEDURE — 6360000002 HC RX W HCPCS: Performed by: STUDENT IN AN ORGANIZED HEALTH CARE EDUCATION/TRAINING PROGRAM

## 2025-01-01 PROCEDURE — 96365 THER/PROPH/DIAG IV INF INIT: CPT

## 2025-01-01 PROCEDURE — 2500000003 HC RX 250 WO HCPCS: Performed by: INTERNAL MEDICINE

## 2025-01-01 PROCEDURE — 96374 THER/PROPH/DIAG INJ IV PUSH: CPT

## 2025-01-01 PROCEDURE — 99221 1ST HOSP IP/OBS SF/LOW 40: CPT

## 2025-01-01 PROCEDURE — 82010 KETONE BODYS QUAN: CPT

## 2025-01-01 PROCEDURE — 85025 COMPLETE CBC W/AUTO DIFF WBC: CPT

## 2025-01-01 PROCEDURE — 84484 ASSAY OF TROPONIN QUANT: CPT

## 2025-01-01 PROCEDURE — 94760 N-INVAS EAR/PLS OXIMETRY 1: CPT

## 2025-01-01 PROCEDURE — 6370000000 HC RX 637 (ALT 250 FOR IP): Performed by: STUDENT IN AN ORGANIZED HEALTH CARE EDUCATION/TRAINING PROGRAM

## 2025-01-01 PROCEDURE — 83735 ASSAY OF MAGNESIUM: CPT

## 2025-01-01 PROCEDURE — 1100000000 HC RM PRIVATE

## 2025-01-01 PROCEDURE — 6370000000 HC RX 637 (ALT 250 FOR IP)

## 2025-01-01 PROCEDURE — 84443 ASSAY THYROID STIM HORMONE: CPT

## 2025-01-01 PROCEDURE — 2500000003 HC RX 250 WO HCPCS: Performed by: STUDENT IN AN ORGANIZED HEALTH CARE EDUCATION/TRAINING PROGRAM

## 2025-01-01 PROCEDURE — 6360000002 HC RX W HCPCS: Performed by: INTERNAL MEDICINE

## 2025-01-01 PROCEDURE — 82962 GLUCOSE BLOOD TEST: CPT

## 2025-01-01 PROCEDURE — 36415 COLL VENOUS BLD VENIPUNCTURE: CPT

## 2025-01-01 PROCEDURE — 6370000000 HC RX 637 (ALT 250 FOR IP): Performed by: INTERNAL MEDICINE

## 2025-01-01 PROCEDURE — 84100 ASSAY OF PHOSPHORUS: CPT

## 2025-01-01 PROCEDURE — 2580000003 HC RX 258: Performed by: INTERNAL MEDICINE

## 2025-01-01 PROCEDURE — 96366 THER/PROPH/DIAG IV INF ADDON: CPT

## 2025-01-01 PROCEDURE — 83036 HEMOGLOBIN GLYCOSYLATED A1C: CPT

## 2025-01-01 PROCEDURE — 80053 COMPREHEN METABOLIC PANEL: CPT

## 2025-01-01 PROCEDURE — 82947 ASSAY GLUCOSE BLOOD QUANT: CPT

## 2025-01-01 PROCEDURE — 93005 ELECTROCARDIOGRAM TRACING: CPT | Performed by: STUDENT IN AN ORGANIZED HEALTH CARE EDUCATION/TRAINING PROGRAM

## 2025-01-01 PROCEDURE — 80048 BASIC METABOLIC PNL TOTAL CA: CPT

## 2025-01-01 PROCEDURE — 97530 THERAPEUTIC ACTIVITIES: CPT | Performed by: PHYSICAL THERAPIST

## 2025-01-01 PROCEDURE — 97161 PT EVAL LOW COMPLEX 20 MIN: CPT | Performed by: PHYSICAL THERAPIST

## 2025-01-01 PROCEDURE — 96375 TX/PRO/DX INJ NEW DRUG ADDON: CPT

## 2025-01-01 PROCEDURE — 82803 BLOOD GASES ANY COMBINATION: CPT

## 2025-01-01 PROCEDURE — 81001 URINALYSIS AUTO W/SCOPE: CPT

## 2025-01-01 PROCEDURE — 70450 CT HEAD/BRAIN W/O DYE: CPT

## 2025-01-01 PROCEDURE — 87040 BLOOD CULTURE FOR BACTERIA: CPT

## 2025-01-01 PROCEDURE — 82077 ASSAY SPEC XCP UR&BREATH IA: CPT

## 2025-01-01 PROCEDURE — 84132 ASSAY OF SERUM POTASSIUM: CPT

## 2025-01-01 PROCEDURE — 2580000003 HC RX 258: Performed by: STUDENT IN AN ORGANIZED HEALTH CARE EDUCATION/TRAINING PROGRAM

## 2025-01-01 PROCEDURE — 99231 SBSQ HOSP IP/OBS SF/LOW 25: CPT

## 2025-01-01 PROCEDURE — 71045 X-RAY EXAM CHEST 1 VIEW: CPT

## 2025-01-01 PROCEDURE — 83880 ASSAY OF NATRIURETIC PEPTIDE: CPT

## 2025-01-01 PROCEDURE — 96376 TX/PRO/DX INJ SAME DRUG ADON: CPT

## 2025-01-01 PROCEDURE — 82330 ASSAY OF CALCIUM: CPT

## 2025-01-01 PROCEDURE — 84295 ASSAY OF SERUM SODIUM: CPT

## 2025-01-01 PROCEDURE — 99232 SBSQ HOSP IP/OBS MODERATE 35: CPT | Performed by: INTERNAL MEDICINE

## 2025-01-01 PROCEDURE — 99291 CRITICAL CARE FIRST HOUR: CPT

## 2025-01-01 PROCEDURE — 84145 PROCALCITONIN (PCT): CPT

## 2025-01-01 PROCEDURE — 86140 C-REACTIVE PROTEIN: CPT

## 2025-01-01 PROCEDURE — 99285 EMERGENCY DEPT VISIT HI MDM: CPT

## 2025-01-01 PROCEDURE — 71046 X-RAY EXAM CHEST 2 VIEWS: CPT

## 2025-01-01 RX ORDER — ENOXAPARIN SODIUM 100 MG/ML
40 INJECTION SUBCUTANEOUS DAILY
Status: DISCONTINUED | OUTPATIENT
Start: 2025-01-01 | End: 2025-01-01 | Stop reason: HOSPADM

## 2025-01-01 RX ORDER — 0.9 % SODIUM CHLORIDE 0.9 %
1000 INTRAVENOUS SOLUTION INTRAVENOUS ONCE
Status: COMPLETED | OUTPATIENT
Start: 2025-01-01 | End: 2025-01-01

## 2025-01-01 RX ORDER — QUETIAPINE FUMARATE 25 MG/1
25 TABLET, FILM COATED ORAL NIGHTLY
Status: COMPLETED | OUTPATIENT
Start: 2025-01-01 | End: 2025-01-01

## 2025-01-01 RX ORDER — INSULIN GLARGINE 100 [IU]/ML
10 INJECTION, SOLUTION SUBCUTANEOUS DAILY
Status: DISCONTINUED | OUTPATIENT
Start: 2025-01-01 | End: 2025-01-01

## 2025-01-01 RX ORDER — GLUCAGON 1 MG/ML
1 KIT INJECTION PRN
Status: DISCONTINUED | OUTPATIENT
Start: 2025-01-01 | End: 2025-01-01 | Stop reason: HOSPADM

## 2025-01-01 RX ORDER — POTASSIUM CHLORIDE 7.45 MG/ML
10 INJECTION INTRAVENOUS PRN
Status: DISCONTINUED | OUTPATIENT
Start: 2025-01-01 | End: 2025-01-01 | Stop reason: HOSPADM

## 2025-01-01 RX ORDER — BRIMONIDINE TARTRATE 2 MG/ML
1 SOLUTION/ DROPS OPHTHALMIC DAILY
Status: DISCONTINUED | OUTPATIENT
Start: 2025-01-01 | End: 2025-01-01 | Stop reason: HOSPADM

## 2025-01-01 RX ORDER — METOPROLOL SUCCINATE 25 MG/1
25 TABLET, EXTENDED RELEASE ORAL DAILY
Status: DISCONTINUED | OUTPATIENT
Start: 2025-01-01 | End: 2025-01-01 | Stop reason: HOSPADM

## 2025-01-01 RX ORDER — HYDROCHLOROTHIAZIDE 25 MG/1
25 TABLET ORAL DAILY
Status: DISCONTINUED | OUTPATIENT
Start: 2025-01-01 | End: 2025-01-01

## 2025-01-01 RX ORDER — DEXTROSE MONOHYDRATE AND SODIUM CHLORIDE 5; .45 G/100ML; G/100ML
INJECTION, SOLUTION INTRAVENOUS CONTINUOUS PRN
Status: DISCONTINUED | OUTPATIENT
Start: 2025-01-01 | End: 2025-01-01

## 2025-01-01 RX ORDER — MORPHINE SULFATE 4 MG/ML
4 INJECTION, SOLUTION INTRAMUSCULAR; INTRAVENOUS
Refills: 0 | Status: DISCONTINUED | OUTPATIENT
Start: 2025-01-01 | End: 2025-01-01 | Stop reason: HOSPADM

## 2025-01-01 RX ORDER — SODIUM CHLORIDE 0.9 % (FLUSH) 0.9 %
5-40 SYRINGE (ML) INJECTION EVERY 12 HOURS SCHEDULED
Status: DISCONTINUED | OUTPATIENT
Start: 2025-01-01 | End: 2025-01-01 | Stop reason: HOSPADM

## 2025-01-01 RX ORDER — GLYCOPYRROLATE 0.2 MG/ML
0.2 INJECTION INTRAMUSCULAR; INTRAVENOUS EVERY 4 HOURS PRN
Status: DISCONTINUED | OUTPATIENT
Start: 2025-01-01 | End: 2025-01-01 | Stop reason: HOSPADM

## 2025-01-01 RX ORDER — MORPHINE SULFATE 4 MG/ML
4 INJECTION, SOLUTION INTRAMUSCULAR; INTRAVENOUS
Status: COMPLETED | OUTPATIENT
Start: 2025-01-01 | End: 2025-01-01

## 2025-01-01 RX ORDER — INSULIN GLARGINE 100 [IU]/ML
10 INJECTION, SOLUTION SUBCUTANEOUS NIGHTLY
Status: DISCONTINUED | OUTPATIENT
Start: 2025-01-01 | End: 2025-01-01

## 2025-01-01 RX ORDER — INSULIN HUMAN 100 [IU]/ML
INJECTION, SUSPENSION SUBCUTANEOUS
Qty: 5 ADJUSTABLE DOSE PRE-FILLED PEN SYRINGE | Refills: 3 | Status: SHIPPED | OUTPATIENT
Start: 2025-01-01 | End: 2025-08-01

## 2025-01-01 RX ORDER — TRAZODONE HYDROCHLORIDE 50 MG/1
50 TABLET ORAL NIGHTLY
Status: DISCONTINUED | OUTPATIENT
Start: 2025-01-01 | End: 2025-01-01 | Stop reason: HOSPADM

## 2025-01-01 RX ORDER — ONDANSETRON 2 MG/ML
4 INJECTION INTRAMUSCULAR; INTRAVENOUS EVERY 6 HOURS PRN
Status: DISCONTINUED | OUTPATIENT
Start: 2025-01-01 | End: 2025-01-01 | Stop reason: HOSPADM

## 2025-01-01 RX ORDER — MAGNESIUM SULFATE IN WATER 40 MG/ML
2000 INJECTION, SOLUTION INTRAVENOUS PRN
Status: DISCONTINUED | OUTPATIENT
Start: 2025-01-01 | End: 2025-01-01 | Stop reason: HOSPADM

## 2025-01-01 RX ORDER — INSULIN LISPRO 100 [IU]/ML
0-4 INJECTION, SOLUTION INTRAVENOUS; SUBCUTANEOUS
Status: DISCONTINUED | OUTPATIENT
Start: 2025-01-01 | End: 2025-01-01

## 2025-01-01 RX ORDER — MORPHINE SULFATE 2 MG/ML
2 INJECTION, SOLUTION INTRAMUSCULAR; INTRAVENOUS
Refills: 0 | Status: DISCONTINUED | OUTPATIENT
Start: 2025-01-01 | End: 2025-01-01 | Stop reason: HOSPADM

## 2025-01-01 RX ORDER — CLOPIDOGREL BISULFATE 75 MG/1
75 TABLET ORAL DAILY
Status: DISCONTINUED | OUTPATIENT
Start: 2025-01-01 | End: 2025-01-01 | Stop reason: HOSPADM

## 2025-01-01 RX ORDER — ACETAMINOPHEN 650 MG/1
650 SUPPOSITORY RECTAL EVERY 6 HOURS PRN
Status: DISCONTINUED | OUTPATIENT
Start: 2025-01-01 | End: 2025-01-01 | Stop reason: HOSPADM

## 2025-01-01 RX ORDER — SODIUM CHLORIDE 9 MG/ML
INJECTION, SOLUTION INTRAVENOUS CONTINUOUS
Status: DISCONTINUED | OUTPATIENT
Start: 2025-01-01 | End: 2025-01-01

## 2025-01-01 RX ORDER — GUAIFENESIN 600 MG/1
600 TABLET, EXTENDED RELEASE ORAL 2 TIMES DAILY
Status: COMPLETED | OUTPATIENT
Start: 2025-01-01 | End: 2025-01-01

## 2025-01-01 RX ORDER — POTASSIUM CHLORIDE 1500 MG/1
40 TABLET, EXTENDED RELEASE ORAL PRN
Status: DISCONTINUED | OUTPATIENT
Start: 2025-01-01 | End: 2025-01-01 | Stop reason: HOSPADM

## 2025-01-01 RX ORDER — LOSARTAN POTASSIUM 100 MG/1
100 TABLET ORAL DAILY
Status: DISCONTINUED | OUTPATIENT
Start: 2025-01-01 | End: 2025-01-01

## 2025-01-01 RX ORDER — INSULIN LISPRO 100 [IU]/ML
5 INJECTION, SOLUTION INTRAVENOUS; SUBCUTANEOUS
Status: DISCONTINUED | OUTPATIENT
Start: 2025-01-01 | End: 2025-01-01

## 2025-01-01 RX ORDER — INSULIN LISPRO 100 [IU]/ML
0-4 INJECTION, SOLUTION INTRAVENOUS; SUBCUTANEOUS
Status: DISCONTINUED | OUTPATIENT
Start: 2025-01-01 | End: 2025-01-01 | Stop reason: HOSPADM

## 2025-01-01 RX ORDER — LOSARTAN POTASSIUM AND HYDROCHLOROTHIAZIDE 25; 100 MG/1; MG/1
1 TABLET ORAL DAILY
Status: DISCONTINUED | OUTPATIENT
Start: 2025-01-01 | End: 2025-01-01 | Stop reason: SDUPTHER

## 2025-01-01 RX ORDER — SODIUM CHLORIDE, SODIUM LACTATE, POTASSIUM CHLORIDE, CALCIUM CHLORIDE 600; 310; 30; 20 MG/100ML; MG/100ML; MG/100ML; MG/100ML
INJECTION, SOLUTION INTRAVENOUS CONTINUOUS
Status: ACTIVE | OUTPATIENT
Start: 2025-01-01 | End: 2025-01-01

## 2025-01-01 RX ORDER — INSULIN HUMAN 100 [IU]/ML
4 INJECTION, SUSPENSION SUBCUTANEOUS EVERY 12 HOURS SCHEDULED
Qty: 5 ADJUSTABLE DOSE PRE-FILLED PEN SYRINGE | Refills: 3 | Status: SHIPPED | OUTPATIENT
Start: 2025-01-01 | End: 2025-01-01

## 2025-01-01 RX ORDER — INSULIN ASPART 100 [IU]/ML
2 INJECTION, SOLUTION INTRAVENOUS; SUBCUTANEOUS
Qty: 2 ADJUSTABLE DOSE PRE-FILLED PEN SYRINGE | Refills: 11 | Status: SHIPPED | OUTPATIENT
Start: 2025-01-01 | End: 2025-01-01 | Stop reason: HOSPADM

## 2025-01-01 RX ORDER — AMLODIPINE BESYLATE 5 MG/1
10 TABLET ORAL DAILY
Status: DISCONTINUED | OUTPATIENT
Start: 2025-01-01 | End: 2025-01-01 | Stop reason: HOSPADM

## 2025-01-01 RX ORDER — 0.9 % SODIUM CHLORIDE 0.9 %
15 INTRAVENOUS SOLUTION INTRAVENOUS ONCE
Status: DISCONTINUED | OUTPATIENT
Start: 2025-01-01 | End: 2025-01-01

## 2025-01-01 RX ORDER — ONDANSETRON 4 MG/1
4 TABLET, ORALLY DISINTEGRATING ORAL EVERY 8 HOURS PRN
Status: DISCONTINUED | OUTPATIENT
Start: 2025-01-01 | End: 2025-01-01 | Stop reason: HOSPADM

## 2025-01-01 RX ORDER — MAGNESIUM SULFATE IN WATER 40 MG/ML
2000 INJECTION, SOLUTION INTRAVENOUS PRN
Status: DISCONTINUED | OUTPATIENT
Start: 2025-01-01 | End: 2025-01-01

## 2025-01-01 RX ORDER — INSULIN LISPRO 100 [IU]/ML
0-8 INJECTION, SOLUTION INTRAVENOUS; SUBCUTANEOUS
Status: DISCONTINUED | OUTPATIENT
Start: 2025-01-01 | End: 2025-01-01

## 2025-01-01 RX ORDER — AZITHROMYCIN 500 MG/1
500 TABLET, FILM COATED ORAL DAILY
Qty: 3 TABLET | Refills: 0 | Status: SHIPPED | OUTPATIENT
Start: 2025-01-01 | End: 2025-01-01

## 2025-01-01 RX ORDER — DEXTROSE MONOHYDRATE 25 G/50ML
25 INJECTION, SOLUTION INTRAVENOUS PRN
Status: DISCONTINUED | OUTPATIENT
Start: 2025-01-01 | End: 2025-01-01 | Stop reason: HOSPADM

## 2025-01-01 RX ORDER — LOSARTAN POTASSIUM AND HYDROCHLOROTHIAZIDE 25; 100 MG/1; MG/1
1 TABLET ORAL DAILY
COMMUNITY
Start: 2025-01-01

## 2025-01-01 RX ORDER — POLYETHYLENE GLYCOL 3350 17 G/17G
17 POWDER, FOR SOLUTION ORAL DAILY PRN
Status: DISCONTINUED | OUTPATIENT
Start: 2025-01-01 | End: 2025-01-01 | Stop reason: HOSPADM

## 2025-01-01 RX ORDER — POTASSIUM CHLORIDE 7.45 MG/ML
10 INJECTION INTRAVENOUS PRN
Status: DISCONTINUED | OUTPATIENT
Start: 2025-01-01 | End: 2025-01-01

## 2025-01-01 RX ORDER — DEXTROSE MONOHYDRATE 100 MG/ML
INJECTION, SOLUTION INTRAVENOUS CONTINUOUS PRN
Status: DISCONTINUED | OUTPATIENT
Start: 2025-01-01 | End: 2025-01-01 | Stop reason: HOSPADM

## 2025-01-01 RX ORDER — SODIUM CHLORIDE 9 MG/ML
INJECTION, SOLUTION INTRAVENOUS PRN
Status: DISCONTINUED | OUTPATIENT
Start: 2025-01-01 | End: 2025-01-01 | Stop reason: HOSPADM

## 2025-01-01 RX ORDER — ACETAMINOPHEN 325 MG/1
650 TABLET ORAL EVERY 6 HOURS PRN
Status: DISCONTINUED | OUTPATIENT
Start: 2025-01-01 | End: 2025-01-01 | Stop reason: HOSPADM

## 2025-01-01 RX ORDER — PRAVASTATIN SODIUM 40 MG
80 TABLET ORAL DAILY
Status: DISCONTINUED | OUTPATIENT
Start: 2025-01-01 | End: 2025-01-01 | Stop reason: HOSPADM

## 2025-01-01 RX ORDER — INSULIN GLARGINE 100 [IU]/ML
13 INJECTION, SOLUTION SUBCUTANEOUS DAILY
Status: DISCONTINUED | OUTPATIENT
Start: 2025-01-01 | End: 2025-01-01

## 2025-01-01 RX ORDER — INDOMETHACIN 25 MG/1
50 CAPSULE ORAL
Status: COMPLETED | OUTPATIENT
Start: 2025-01-01 | End: 2025-01-01

## 2025-01-01 RX ORDER — CEFDINIR 300 MG/1
300 CAPSULE ORAL 2 TIMES DAILY
Qty: 6 CAPSULE | Refills: 0 | Status: SHIPPED | OUTPATIENT
Start: 2025-01-01 | End: 2025-01-01

## 2025-01-01 RX ORDER — BENZTROPINE MESYLATE 1 MG/1
1 TABLET ORAL 2 TIMES DAILY
Status: DISCONTINUED | OUTPATIENT
Start: 2025-01-01 | End: 2025-01-01 | Stop reason: HOSPADM

## 2025-01-01 RX ADMIN — CLOPIDOGREL BISULFATE 75 MG: 75 TABLET, FILM COATED ORAL at 09:35

## 2025-01-01 RX ADMIN — AZITHROMYCIN MONOHYDRATE 500 MG: 500 INJECTION, POWDER, LYOPHILIZED, FOR SOLUTION INTRAVENOUS at 18:14

## 2025-01-01 RX ADMIN — HYDROCHLOROTHIAZIDE 25 MG: 25 TABLET ORAL at 09:48

## 2025-01-01 RX ADMIN — INSULIN LISPRO 2 UNITS: 100 INJECTION, SOLUTION INTRAVENOUS; SUBCUTANEOUS at 13:13

## 2025-01-01 RX ADMIN — SODIUM CHLORIDE, PRESERVATIVE FREE 10 ML: 5 INJECTION INTRAVENOUS at 20:05

## 2025-01-01 RX ADMIN — HYDROCHLOROTHIAZIDE 25 MG: 25 TABLET ORAL at 10:48

## 2025-01-01 RX ADMIN — GUAIFENESIN 600 MG: 600 TABLET, EXTENDED RELEASE ORAL at 10:16

## 2025-01-01 RX ADMIN — LEVOTHYROXINE SODIUM 175 MCG: 0.15 TABLET ORAL at 10:17

## 2025-01-01 RX ADMIN — INSULIN LISPRO 2 UNITS: 100 INJECTION, SOLUTION INTRAVENOUS; SUBCUTANEOUS at 21:33

## 2025-01-01 RX ADMIN — ENOXAPARIN SODIUM 40 MG: 100 INJECTION SUBCUTANEOUS at 09:35

## 2025-01-01 RX ADMIN — DEXTROSE MONOHYDRATE 25 G: 25 INJECTION, SOLUTION INTRAVENOUS at 08:14

## 2025-01-01 RX ADMIN — INSULIN HUMAN 3 UNITS: 100 INJECTION, SUSPENSION SUBCUTANEOUS at 20:01

## 2025-01-01 RX ADMIN — MELATONIN 3 MG: at 23:05

## 2025-01-01 RX ADMIN — ENOXAPARIN SODIUM 40 MG: 100 INJECTION SUBCUTANEOUS at 10:34

## 2025-01-01 RX ADMIN — SODIUM CHLORIDE, PRESERVATIVE FREE 10 ML: 5 INJECTION INTRAVENOUS at 20:55

## 2025-01-01 RX ADMIN — MELATONIN 3 MG: at 20:55

## 2025-01-01 RX ADMIN — INSULIN LISPRO 2 UNITS: 100 INJECTION, SOLUTION INTRAVENOUS; SUBCUTANEOUS at 09:38

## 2025-01-01 RX ADMIN — INSULIN GLARGINE 10 UNITS: 100 INJECTION, SOLUTION SUBCUTANEOUS at 12:12

## 2025-01-01 RX ADMIN — METOPROLOL SUCCINATE 25 MG: 25 TABLET, EXTENDED RELEASE ORAL at 09:48

## 2025-01-01 RX ADMIN — MELATONIN 3 MG: at 20:13

## 2025-01-01 RX ADMIN — LOSARTAN POTASSIUM 100 MG: 100 TABLET, FILM COATED ORAL at 09:34

## 2025-01-01 RX ADMIN — INSULIN HUMAN 5 UNITS: 100 INJECTION, SUSPENSION SUBCUTANEOUS at 10:47

## 2025-01-01 RX ADMIN — MORPHINE SULFATE 2 MG: 2 INJECTION, SOLUTION INTRAMUSCULAR; INTRAVENOUS at 13:10

## 2025-01-01 RX ADMIN — ENOXAPARIN SODIUM 40 MG: 100 INJECTION SUBCUTANEOUS at 09:56

## 2025-01-01 RX ADMIN — SODIUM CHLORIDE, PRESERVATIVE FREE 10 ML: 5 INJECTION INTRAVENOUS at 09:27

## 2025-01-01 RX ADMIN — LEVOTHYROXINE SODIUM 175 MCG: 0.15 TABLET ORAL at 05:55

## 2025-01-01 RX ADMIN — LOSARTAN POTASSIUM 100 MG: 100 TABLET, FILM COATED ORAL at 10:48

## 2025-01-01 RX ADMIN — ENOXAPARIN SODIUM 40 MG: 100 INJECTION SUBCUTANEOUS at 09:26

## 2025-01-01 RX ADMIN — INSULIN LISPRO 3 UNITS: 100 INJECTION, SOLUTION INTRAVENOUS; SUBCUTANEOUS at 21:00

## 2025-01-01 RX ADMIN — MELATONIN 3 MG: at 21:23

## 2025-01-01 RX ADMIN — METOPROLOL SUCCINATE 25 MG: 25 TABLET, EXTENDED RELEASE ORAL at 11:03

## 2025-01-01 RX ADMIN — WATER 2000 MG: 1 INJECTION INTRAMUSCULAR; INTRAVENOUS; SUBCUTANEOUS at 17:01

## 2025-01-01 RX ADMIN — CLOPIDOGREL BISULFATE 75 MG: 75 TABLET, FILM COATED ORAL at 08:57

## 2025-01-01 RX ADMIN — LOSARTAN POTASSIUM 100 MG: 100 TABLET, FILM COATED ORAL at 09:47

## 2025-01-01 RX ADMIN — INSULIN LISPRO 6 UNITS: 100 INJECTION, SOLUTION INTRAVENOUS; SUBCUTANEOUS at 14:08

## 2025-01-01 RX ADMIN — WATER 2000 MG: 1 INJECTION INTRAMUSCULAR; INTRAVENOUS; SUBCUTANEOUS at 17:02

## 2025-01-01 RX ADMIN — CLOPIDOGREL BISULFATE 75 MG: 75 TABLET, FILM COATED ORAL at 09:26

## 2025-01-01 RX ADMIN — GUAIFENESIN 600 MG: 600 TABLET, EXTENDED RELEASE ORAL at 08:57

## 2025-01-01 RX ADMIN — BENZTROPINE MESYLATE 1 MG: 1 TABLET ORAL at 09:55

## 2025-01-01 RX ADMIN — BENZTROPINE MESYLATE 1 MG: 1 TABLET ORAL at 21:28

## 2025-01-01 RX ADMIN — GUAIFENESIN 600 MG: 600 TABLET, EXTENDED RELEASE ORAL at 21:28

## 2025-01-01 RX ADMIN — ENOXAPARIN SODIUM 40 MG: 100 INJECTION SUBCUTANEOUS at 10:47

## 2025-01-01 RX ADMIN — AZITHROMYCIN MONOHYDRATE 500 MG: 500 INJECTION, POWDER, LYOPHILIZED, FOR SOLUTION INTRAVENOUS at 18:02

## 2025-01-01 RX ADMIN — SODIUM CHLORIDE, PRESERVATIVE FREE 10 ML: 5 INJECTION INTRAVENOUS at 09:48

## 2025-01-01 RX ADMIN — QUETIAPINE FUMARATE 25 MG: 25 TABLET ORAL at 21:22

## 2025-01-01 RX ADMIN — LOSARTAN POTASSIUM 100 MG: 100 TABLET, FILM COATED ORAL at 09:54

## 2025-01-01 RX ADMIN — LEVOTHYROXINE SODIUM 175 MCG: 0.15 TABLET ORAL at 04:55

## 2025-01-01 RX ADMIN — BRIMONIDINE TARTRATE 1 DROP: 2 SOLUTION OPHTHALMIC at 08:37

## 2025-01-01 RX ADMIN — PRAVASTATIN SODIUM 80 MG: 40 TABLET ORAL at 09:25

## 2025-01-01 RX ADMIN — GUAIFENESIN 600 MG: 600 TABLET, EXTENDED RELEASE ORAL at 21:22

## 2025-01-01 RX ADMIN — PRAVASTATIN SODIUM 80 MG: 40 TABLET ORAL at 08:57

## 2025-01-01 RX ADMIN — PRAVASTATIN SODIUM 80 MG: 40 TABLET ORAL at 10:17

## 2025-01-01 RX ADMIN — BENZTROPINE MESYLATE 1 MG: 1 TABLET ORAL at 09:35

## 2025-01-01 RX ADMIN — CLOPIDOGREL BISULFATE 75 MG: 75 TABLET, FILM COATED ORAL at 08:35

## 2025-01-01 RX ADMIN — MELATONIN 3 MG: at 21:33

## 2025-01-01 RX ADMIN — WATER 1000 MG: 1 INJECTION INTRAMUSCULAR; INTRAVENOUS; SUBCUTANEOUS at 17:29

## 2025-01-01 RX ADMIN — INSULIN LISPRO 2 UNITS: 100 INJECTION, SOLUTION INTRAVENOUS; SUBCUTANEOUS at 17:54

## 2025-01-01 RX ADMIN — AMLODIPINE BESYLATE 10 MG: 5 TABLET ORAL at 10:34

## 2025-01-01 RX ADMIN — TRAZODONE HYDROCHLORIDE 50 MG: 50 TABLET ORAL at 20:59

## 2025-01-01 RX ADMIN — CLOPIDOGREL BISULFATE 75 MG: 75 TABLET, FILM COATED ORAL at 10:17

## 2025-01-01 RX ADMIN — SODIUM CHLORIDE, PRESERVATIVE FREE 10 ML: 5 INJECTION INTRAVENOUS at 21:28

## 2025-01-01 RX ADMIN — BENZTROPINE MESYLATE 1 MG: 1 TABLET ORAL at 21:37

## 2025-01-01 RX ADMIN — ENOXAPARIN SODIUM 40 MG: 100 INJECTION SUBCUTANEOUS at 11:04

## 2025-01-01 RX ADMIN — HYDROCHLOROTHIAZIDE 25 MG: 25 TABLET ORAL at 10:17

## 2025-01-01 RX ADMIN — CLOPIDOGREL BISULFATE 75 MG: 75 TABLET, FILM COATED ORAL at 10:34

## 2025-01-01 RX ADMIN — SODIUM CHLORIDE, PRESERVATIVE FREE 10 ML: 5 INJECTION INTRAVENOUS at 11:20

## 2025-01-01 RX ADMIN — SODIUM CHLORIDE, PRESERVATIVE FREE 10 ML: 5 INJECTION INTRAVENOUS at 09:49

## 2025-01-01 RX ADMIN — LOSARTAN POTASSIUM 100 MG: 100 TABLET, FILM COATED ORAL at 09:26

## 2025-01-01 RX ADMIN — BENZTROPINE MESYLATE 1 MG: 1 TABLET ORAL at 21:22

## 2025-01-01 RX ADMIN — INSULIN LISPRO 2 UNITS: 100 INJECTION, SOLUTION INTRAVENOUS; SUBCUTANEOUS at 13:18

## 2025-01-01 RX ADMIN — SODIUM CHLORIDE, PRESERVATIVE FREE 10 ML: 5 INJECTION INTRAVENOUS at 10:34

## 2025-01-01 RX ADMIN — SODIUM CHLORIDE 5 MCG/MIN: 0.9 INJECTION, SOLUTION INTRAVENOUS at 10:28

## 2025-01-01 RX ADMIN — CLOPIDOGREL BISULFATE 75 MG: 75 TABLET, FILM COATED ORAL at 09:47

## 2025-01-01 RX ADMIN — TRAZODONE HYDROCHLORIDE 50 MG: 50 TABLET ORAL at 21:22

## 2025-01-01 RX ADMIN — PRAVASTATIN SODIUM 80 MG: 40 TABLET ORAL at 10:48

## 2025-01-01 RX ADMIN — WATER 2000 MG: 1 INJECTION INTRAMUSCULAR; INTRAVENOUS; SUBCUTANEOUS at 18:07

## 2025-01-01 RX ADMIN — TRAZODONE HYDROCHLORIDE 50 MG: 50 TABLET ORAL at 04:21

## 2025-01-01 RX ADMIN — ENOXAPARIN SODIUM 40 MG: 100 INJECTION SUBCUTANEOUS at 09:48

## 2025-01-01 RX ADMIN — SODIUM CHLORIDE, PRESERVATIVE FREE 10 ML: 5 INJECTION INTRAVENOUS at 21:22

## 2025-01-01 RX ADMIN — AMLODIPINE BESYLATE 10 MG: 5 TABLET ORAL at 08:57

## 2025-01-01 RX ADMIN — GUAIFENESIN 600 MG: 600 TABLET, EXTENDED RELEASE ORAL at 09:34

## 2025-01-01 RX ADMIN — INSULIN LISPRO 2 UNITS: 100 INJECTION, SOLUTION INTRAVENOUS; SUBCUTANEOUS at 21:20

## 2025-01-01 RX ADMIN — MELATONIN 3 MG: at 23:10

## 2025-01-01 RX ADMIN — METOPROLOL SUCCINATE 25 MG: 25 TABLET, EXTENDED RELEASE ORAL at 10:48

## 2025-01-01 RX ADMIN — SODIUM BICARBONATE 50 MEQ: 84 INJECTION, SOLUTION INTRAVENOUS at 12:29

## 2025-01-01 RX ADMIN — HYDROCHLOROTHIAZIDE 25 MG: 25 TABLET ORAL at 09:35

## 2025-01-01 RX ADMIN — PRAVASTATIN SODIUM 80 MG: 40 TABLET ORAL at 10:34

## 2025-01-01 RX ADMIN — HYDROCHLOROTHIAZIDE 25 MG: 25 TABLET ORAL at 09:26

## 2025-01-01 RX ADMIN — SODIUM CHLORIDE, PRESERVATIVE FREE 10 ML: 5 INJECTION INTRAVENOUS at 10:17

## 2025-01-01 RX ADMIN — QUETIAPINE FUMARATE 25 MG: 25 TABLET ORAL at 20:04

## 2025-01-01 RX ADMIN — INSULIN HUMAN 4 UNITS: 100 INJECTION, SUSPENSION SUBCUTANEOUS at 04:21

## 2025-01-01 RX ADMIN — ENOXAPARIN SODIUM 40 MG: 100 INJECTION SUBCUTANEOUS at 08:56

## 2025-01-01 RX ADMIN — TRAZODONE HYDROCHLORIDE 50 MG: 50 TABLET ORAL at 20:02

## 2025-01-01 RX ADMIN — LOSARTAN POTASSIUM 100 MG: 100 TABLET, FILM COATED ORAL at 08:57

## 2025-01-01 RX ADMIN — LEVOTHYROXINE SODIUM 175 MCG: 0.15 TABLET ORAL at 05:23

## 2025-01-01 RX ADMIN — PRAVASTATIN SODIUM 80 MG: 40 TABLET ORAL at 09:47

## 2025-01-01 RX ADMIN — BENZTROPINE MESYLATE 1 MG: 1 TABLET ORAL at 04:21

## 2025-01-01 RX ADMIN — HYDROCHLOROTHIAZIDE 25 MG: 25 TABLET ORAL at 11:03

## 2025-01-01 RX ADMIN — SODIUM CHLORIDE, PRESERVATIVE FREE 10 ML: 5 INJECTION INTRAVENOUS at 21:29

## 2025-01-01 RX ADMIN — TRAZODONE HYDROCHLORIDE 50 MG: 50 TABLET ORAL at 21:23

## 2025-01-01 RX ADMIN — LEVOTHYROXINE SODIUM 175 MCG: 0.15 TABLET ORAL at 05:58

## 2025-01-01 RX ADMIN — INSULIN HUMAN 5 UNITS: 100 INJECTION, SUSPENSION SUBCUTANEOUS at 20:26

## 2025-01-01 RX ADMIN — ENOXAPARIN SODIUM 40 MG: 100 INJECTION SUBCUTANEOUS at 18:23

## 2025-01-01 RX ADMIN — SODIUM CHLORIDE, PRESERVATIVE FREE 10 ML: 5 INJECTION INTRAVENOUS at 21:05

## 2025-01-01 RX ADMIN — HYDROCHLOROTHIAZIDE 25 MG: 25 TABLET ORAL at 09:55

## 2025-01-01 RX ADMIN — LEVOTHYROXINE SODIUM 175 MCG: 0.15 TABLET ORAL at 05:26

## 2025-01-01 RX ADMIN — CLOPIDOGREL BISULFATE 75 MG: 75 TABLET, FILM COATED ORAL at 10:48

## 2025-01-01 RX ADMIN — PRAVASTATIN SODIUM 80 MG: 40 TABLET ORAL at 09:35

## 2025-01-01 RX ADMIN — GUAIFENESIN 600 MG: 600 TABLET, EXTENDED RELEASE ORAL at 21:00

## 2025-01-01 RX ADMIN — AMLODIPINE BESYLATE 10 MG: 5 TABLET ORAL at 09:34

## 2025-01-01 RX ADMIN — SODIUM CHLORIDE, PRESERVATIVE FREE 10 ML: 5 INJECTION INTRAVENOUS at 20:17

## 2025-01-01 RX ADMIN — INSULIN LISPRO 1 UNITS: 100 INJECTION, SOLUTION INTRAVENOUS; SUBCUTANEOUS at 20:55

## 2025-01-01 RX ADMIN — BRIMONIDINE TARTRATE 1 DROP: 2 SOLUTION OPHTHALMIC at 10:34

## 2025-01-01 RX ADMIN — INSULIN HUMAN 4 UNITS: 100 INJECTION, SUSPENSION SUBCUTANEOUS at 11:04

## 2025-01-01 RX ADMIN — BENZTROPINE MESYLATE 1 MG: 1 TABLET ORAL at 08:57

## 2025-01-01 RX ADMIN — LOSARTAN POTASSIUM 100 MG: 100 TABLET, FILM COATED ORAL at 11:03

## 2025-01-01 RX ADMIN — ENOXAPARIN SODIUM 40 MG: 100 INJECTION SUBCUTANEOUS at 08:36

## 2025-01-01 RX ADMIN — CLOPIDOGREL BISULFATE 75 MG: 75 TABLET, FILM COATED ORAL at 11:03

## 2025-01-01 RX ADMIN — QUETIAPINE FUMARATE 25 MG: 25 TABLET ORAL at 20:59

## 2025-01-01 RX ADMIN — SODIUM CHLORIDE 1000 ML: 9 INJECTION, SOLUTION INTRAVENOUS at 10:43

## 2025-01-01 RX ADMIN — INSULIN LISPRO 2 UNITS: 100 INJECTION, SOLUTION INTRAVENOUS; SUBCUTANEOUS at 17:57

## 2025-01-01 RX ADMIN — AMLODIPINE BESYLATE 10 MG: 5 TABLET ORAL at 09:26

## 2025-01-01 RX ADMIN — INSULIN LISPRO 1 UNITS: 100 INJECTION, SOLUTION INTRAVENOUS; SUBCUTANEOUS at 13:11

## 2025-01-01 RX ADMIN — BRIMONIDINE TARTRATE 1 DROP: 2 SOLUTION OPHTHALMIC at 08:22

## 2025-01-01 RX ADMIN — INSULIN LISPRO 2 UNITS: 100 INJECTION, SOLUTION INTRAVENOUS; SUBCUTANEOUS at 09:33

## 2025-01-01 RX ADMIN — AMLODIPINE BESYLATE 10 MG: 5 TABLET ORAL at 10:48

## 2025-01-01 RX ADMIN — BENZTROPINE MESYLATE 1 MG: 1 TABLET ORAL at 20:02

## 2025-01-01 RX ADMIN — BENZTROPINE MESYLATE 1 MG: 1 TABLET ORAL at 20:55

## 2025-01-01 RX ADMIN — BRIMONIDINE TARTRATE 1 DROP: 2 SOLUTION OPHTHALMIC at 11:17

## 2025-01-01 RX ADMIN — LEVOTHYROXINE SODIUM 175 MCG: 0.15 TABLET ORAL at 05:40

## 2025-01-01 RX ADMIN — INSULIN LISPRO 5 UNITS: 100 INJECTION, SOLUTION INTRAVENOUS; SUBCUTANEOUS at 14:07

## 2025-01-01 RX ADMIN — LEVOTHYROXINE SODIUM 175 MCG: 0.15 TABLET ORAL at 08:21

## 2025-01-01 RX ADMIN — GUAIFENESIN 600 MG: 600 TABLET, EXTENDED RELEASE ORAL at 21:33

## 2025-01-01 RX ADMIN — BENZTROPINE MESYLATE 1 MG: 1 TABLET ORAL at 21:23

## 2025-01-01 RX ADMIN — POTASSIUM BICARBONATE 40 MEQ: 782 TABLET, EFFERVESCENT ORAL at 14:00

## 2025-01-01 RX ADMIN — LOSARTAN POTASSIUM 100 MG: 100 TABLET, FILM COATED ORAL at 10:34

## 2025-01-01 RX ADMIN — TRAZODONE HYDROCHLORIDE 50 MG: 50 TABLET ORAL at 21:33

## 2025-01-01 RX ADMIN — LEVOTHYROXINE SODIUM 175 MCG: 0.15 TABLET ORAL at 09:34

## 2025-01-01 RX ADMIN — INSULIN LISPRO 4 UNITS: 100 INJECTION, SOLUTION INTRAVENOUS; SUBCUTANEOUS at 12:49

## 2025-01-01 RX ADMIN — PRAVASTATIN SODIUM 80 MG: 40 TABLET ORAL at 08:36

## 2025-01-01 RX ADMIN — BENZTROPINE MESYLATE 1 MG: 1 TABLET ORAL at 21:33

## 2025-01-01 RX ADMIN — BENZTROPINE MESYLATE 1 MG: 1 TABLET ORAL at 09:26

## 2025-01-01 RX ADMIN — TRAZODONE HYDROCHLORIDE 50 MG: 50 TABLET ORAL at 20:13

## 2025-01-01 RX ADMIN — AZITHROMYCIN MONOHYDRATE 500 MG: 500 INJECTION, POWDER, LYOPHILIZED, FOR SOLUTION INTRAVENOUS at 17:50

## 2025-01-01 RX ADMIN — BRIMONIDINE TARTRATE 1 DROP: 2 SOLUTION OPHTHALMIC at 11:12

## 2025-01-01 RX ADMIN — LEVOTHYROXINE SODIUM 175 MCG: 0.15 TABLET ORAL at 09:48

## 2025-01-01 RX ADMIN — POLYETHYLENE GLYCOL 3350 17 G: 17 POWDER, FOR SOLUTION ORAL at 18:08

## 2025-01-01 RX ADMIN — METOPROLOL SUCCINATE 25 MG: 25 TABLET, EXTENDED RELEASE ORAL at 09:34

## 2025-01-01 RX ADMIN — INSULIN LISPRO 3 UNITS: 100 INJECTION, SOLUTION INTRAVENOUS; SUBCUTANEOUS at 20:25

## 2025-01-01 RX ADMIN — BRIMONIDINE TARTRATE 1 DROP: 2 SOLUTION OPHTHALMIC at 09:35

## 2025-01-01 RX ADMIN — TRAZODONE HYDROCHLORIDE 50 MG: 50 TABLET ORAL at 21:37

## 2025-01-01 RX ADMIN — INSULIN HUMAN 3 UNITS: 100 INJECTION, SUSPENSION SUBCUTANEOUS at 21:21

## 2025-01-01 RX ADMIN — BRIMONIDINE TARTRATE 1 DROP: 2 SOLUTION OPHTHALMIC at 09:56

## 2025-01-01 RX ADMIN — GUAIFENESIN 600 MG: 600 TABLET, EXTENDED RELEASE ORAL at 09:48

## 2025-01-01 RX ADMIN — PIPERACILLIN AND TAZOBACTAM 4500 MG: 4; .5 INJECTION, POWDER, LYOPHILIZED, FOR SOLUTION INTRAVENOUS at 11:42

## 2025-01-01 RX ADMIN — TRAZODONE HYDROCHLORIDE 50 MG: 50 TABLET ORAL at 21:28

## 2025-01-01 RX ADMIN — INSULIN HUMAN 6 UNITS: 100 INJECTION, SUSPENSION SUBCUTANEOUS at 09:38

## 2025-01-01 RX ADMIN — MELATONIN 3 MG: at 20:04

## 2025-01-01 RX ADMIN — INSULIN LISPRO 4 UNITS: 100 INJECTION, SOLUTION INTRAVENOUS; SUBCUTANEOUS at 21:13

## 2025-01-01 RX ADMIN — BRIMONIDINE TARTRATE 1 DROP: 2 SOLUTION OPHTHALMIC at 09:27

## 2025-01-01 RX ADMIN — BENZTROPINE MESYLATE 1 MG: 1 TABLET ORAL at 20:59

## 2025-01-01 RX ADMIN — METOPROLOL SUCCINATE 25 MG: 25 TABLET, EXTENDED RELEASE ORAL at 10:34

## 2025-01-01 RX ADMIN — SODIUM CHLORIDE, PRESERVATIVE FREE 10 ML: 5 INJECTION INTRAVENOUS at 21:33

## 2025-01-01 RX ADMIN — WATER 1000 MG: 1 INJECTION INTRAMUSCULAR; INTRAVENOUS; SUBCUTANEOUS at 17:57

## 2025-01-01 RX ADMIN — BENZTROPINE MESYLATE 1 MG: 1 TABLET ORAL at 10:48

## 2025-01-01 RX ADMIN — GUAIFENESIN 600 MG: 600 TABLET, EXTENDED RELEASE ORAL at 20:04

## 2025-01-01 RX ADMIN — HYDROCHLOROTHIAZIDE 25 MG: 25 TABLET ORAL at 08:56

## 2025-01-01 RX ADMIN — SODIUM CHLORIDE, PRESERVATIVE FREE 10 ML: 5 INJECTION INTRAVENOUS at 09:35

## 2025-01-01 RX ADMIN — PRAVASTATIN SODIUM 80 MG: 40 TABLET ORAL at 09:54

## 2025-01-01 RX ADMIN — METOPROLOL SUCCINATE 25 MG: 25 TABLET, EXTENDED RELEASE ORAL at 08:57

## 2025-01-01 RX ADMIN — BENZTROPINE MESYLATE 1 MG: 1 TABLET ORAL at 08:36

## 2025-01-01 RX ADMIN — MELATONIN 3 MG: at 01:51

## 2025-01-01 RX ADMIN — INSULIN LISPRO 4 UNITS: 100 INJECTION, SOLUTION INTRAVENOUS; SUBCUTANEOUS at 09:48

## 2025-01-01 RX ADMIN — CLOPIDOGREL BISULFATE 75 MG: 75 TABLET, FILM COATED ORAL at 09:55

## 2025-01-01 RX ADMIN — SODIUM CHLORIDE, PRESERVATIVE FREE 10 ML: 5 INJECTION INTRAVENOUS at 10:48

## 2025-01-01 RX ADMIN — WATER 2000 MG: 1 INJECTION INTRAMUSCULAR; INTRAVENOUS; SUBCUTANEOUS at 18:36

## 2025-01-01 RX ADMIN — AMLODIPINE BESYLATE 10 MG: 5 TABLET ORAL at 11:03

## 2025-01-01 RX ADMIN — METOPROLOL SUCCINATE 25 MG: 25 TABLET, EXTENDED RELEASE ORAL at 09:25

## 2025-01-01 RX ADMIN — PRAVASTATIN SODIUM 80 MG: 40 TABLET ORAL at 11:03

## 2025-01-01 RX ADMIN — INSULIN LISPRO 1 UNITS: 100 INJECTION, SOLUTION INTRAVENOUS; SUBCUTANEOUS at 17:51

## 2025-01-01 RX ADMIN — INSULIN HUMAN 5 UNITS: 100 INJECTION, SUSPENSION SUBCUTANEOUS at 10:33

## 2025-01-01 RX ADMIN — AMLODIPINE BESYLATE 10 MG: 5 TABLET ORAL at 09:48

## 2025-01-01 RX ADMIN — INSULIN HUMAN 4 UNITS: 100 INJECTION, SUSPENSION SUBCUTANEOUS at 20:55

## 2025-01-01 RX ADMIN — SODIUM CHLORIDE, SODIUM LACTATE, POTASSIUM CHLORIDE, AND CALCIUM CHLORIDE: .6; .31; .03; .02 INJECTION, SOLUTION INTRAVENOUS at 19:01

## 2025-01-01 RX ADMIN — AMLODIPINE BESYLATE 10 MG: 5 TABLET ORAL at 10:17

## 2025-01-01 RX ADMIN — AZITHROMYCIN MONOHYDRATE 500 MG: 500 INJECTION, POWDER, LYOPHILIZED, FOR SOLUTION INTRAVENOUS at 18:37

## 2025-01-01 RX ADMIN — LOSARTAN POTASSIUM 100 MG: 100 TABLET, FILM COATED ORAL at 10:17

## 2025-01-01 RX ADMIN — GUAIFENESIN 600 MG: 600 TABLET, EXTENDED RELEASE ORAL at 09:26

## 2025-01-01 RX ADMIN — INSULIN LISPRO 2 UNITS: 100 INJECTION, SOLUTION INTRAVENOUS; SUBCUTANEOUS at 10:15

## 2025-01-01 RX ADMIN — SODIUM CHLORIDE 1000 ML: 0.9 INJECTION, SOLUTION INTRAVENOUS at 10:29

## 2025-01-01 RX ADMIN — INSULIN LISPRO 2 UNITS: 100 INJECTION, SOLUTION INTRAVENOUS; SUBCUTANEOUS at 13:40

## 2025-01-01 RX ADMIN — INSULIN LISPRO 3 UNITS: 100 INJECTION, SOLUTION INTRAVENOUS; SUBCUTANEOUS at 12:12

## 2025-01-01 RX ADMIN — BENZTROPINE MESYLATE 1 MG: 1 TABLET ORAL at 10:34

## 2025-01-01 RX ADMIN — MORPHINE SULFATE 4 MG: 4 INJECTION INTRAVENOUS at 14:20

## 2025-01-01 RX ADMIN — WATER 1000 MG: 1 INJECTION INTRAMUSCULAR; INTRAVENOUS; SUBCUTANEOUS at 17:14

## 2025-01-01 RX ADMIN — SODIUM CHLORIDE, PRESERVATIVE FREE 10 ML: 5 INJECTION INTRAVENOUS at 21:23

## 2025-01-01 RX ADMIN — SODIUM CHLORIDE, PRESERVATIVE FREE 10 ML: 5 INJECTION INTRAVENOUS at 20:02

## 2025-01-01 RX ADMIN — ENOXAPARIN SODIUM 40 MG: 100 INJECTION SUBCUTANEOUS at 10:16

## 2025-01-01 RX ADMIN — BENZTROPINE MESYLATE 1 MG: 1 TABLET ORAL at 09:47

## 2025-01-01 RX ADMIN — SODIUM CHLORIDE, PRESERVATIVE FREE 10 ML: 5 INJECTION INTRAVENOUS at 08:36

## 2025-01-01 RX ADMIN — INSULIN GLARGINE 13 UNITS: 100 INJECTION, SOLUTION SUBCUTANEOUS at 14:08

## 2025-01-01 RX ADMIN — BENZTROPINE MESYLATE 1 MG: 1 TABLET ORAL at 20:13

## 2025-01-01 RX ADMIN — BRIMONIDINE TARTRATE 1 DROP: 2 SOLUTION OPHTHALMIC at 10:31

## 2025-01-01 RX ADMIN — METOPROLOL SUCCINATE 25 MG: 25 TABLET, EXTENDED RELEASE ORAL at 10:16

## 2025-01-01 RX ADMIN — HYDROCHLOROTHIAZIDE 25 MG: 25 TABLET ORAL at 10:34

## 2025-01-01 RX ADMIN — BENZTROPINE MESYLATE 1 MG: 1 TABLET ORAL at 11:03

## 2025-01-01 RX ADMIN — METOPROLOL SUCCINATE 25 MG: 25 TABLET, EXTENDED RELEASE ORAL at 09:55

## 2025-01-01 RX ADMIN — BENZTROPINE MESYLATE 1 MG: 1 TABLET ORAL at 10:16

## 2025-01-01 RX ADMIN — INSULIN LISPRO 3 UNITS: 100 INJECTION, SOLUTION INTRAVENOUS; SUBCUTANEOUS at 13:32

## 2025-01-01 RX ADMIN — TRAZODONE HYDROCHLORIDE 50 MG: 50 TABLET ORAL at 20:55

## 2025-01-01 RX ADMIN — INSULIN LISPRO 2 UNITS: 100 INJECTION, SOLUTION INTRAVENOUS; SUBCUTANEOUS at 10:00

## 2025-01-01 RX ADMIN — AMLODIPINE BESYLATE 10 MG: 5 TABLET ORAL at 09:55

## 2025-01-01 RX ADMIN — SODIUM CHLORIDE, PRESERVATIVE FREE 10 ML: 5 INJECTION INTRAVENOUS at 08:22

## 2025-01-01 ASSESSMENT — PAIN SCALES - PAIN ASSESSMENT IN ADVANCED DEMENTIA (PAINAD)
TOTALSCORE: 0
BREATHING: NORMAL
FACIALEXPRESSION: SMILING OR INEXPRESSIVE
CONSOLABILITY: DISTRACTED OR REASSURED BY VOICE/TOUCH
BODYLANGUAGE: RELAXED
BREATHING: NORMAL
FACIALEXPRESSION: SMILING OR INEXPRESSIVE
CONSOLABILITY: NO NEED TO CONSOLE
TOTALSCORE: 0
BODYLANGUAGE: RELAXED
TOTALSCORE: 0
BODYLANGUAGE: RELAXED
FACIALEXPRESSION: SMILING OR INEXPRESSIVE
CONSOLABILITY: NO NEED TO CONSOLE
CONSOLABILITY: NO NEED TO CONSOLE
BREATHING: NORMAL
CONSOLABILITY: NO NEED TO CONSOLE
FACIALEXPRESSION: SMILING OR INEXPRESSIVE
BREATHING: NORMAL
TOTALSCORE: 0
TOTALSCORE: 0
BREATHING: NORMAL
BREATHING: NORMAL
BODYLANGUAGE: RELAXED
BREATHING: NORMAL
CONSOLABILITY: NO NEED TO CONSOLE
BODYLANGUAGE: TENSE, DISTRESSED PACING, FIDGETING
BODYLANGUAGE: RELAXED
TOTALSCORE: 0
CONSOLABILITY: NO NEED TO CONSOLE
BREATHING: NORMAL
CONSOLABILITY: NO NEED TO CONSOLE
BREATHING: NORMAL
BODYLANGUAGE: RELAXED
FACIALEXPRESSION: SMILING OR INEXPRESSIVE
BODYLANGUAGE: RELAXED
CONSOLABILITY: NO NEED TO CONSOLE
FACIALEXPRESSION: SMILING OR INEXPRESSIVE
TOTALSCORE: 0
BREATHING: NORMAL
FACIALEXPRESSION: SMILING OR INEXPRESSIVE
TOTALSCORE: 0
CONSOLABILITY: NO NEED TO CONSOLE
TOTALSCORE: 0
TOTALSCORE: 0
TOTALSCORE: 2
FACIALEXPRESSION: SMILING OR INEXPRESSIVE
FACIALEXPRESSION: SMILING OR INEXPRESSIVE
BODYLANGUAGE: RELAXED
BREATHING: NORMAL
TOTALSCORE: 0
CONSOLABILITY: NO NEED TO CONSOLE
BREATHING: NORMAL
FACIALEXPRESSION: SMILING OR INEXPRESSIVE
TOTALSCORE: 0
BREATHING: NORMAL
BODYLANGUAGE: RELAXED
FACIALEXPRESSION: SMILING OR INEXPRESSIVE
CONSOLABILITY: NO NEED TO CONSOLE
BODYLANGUAGE: RELAXED
BREATHING: NORMAL
CONSOLABILITY: NO NEED TO CONSOLE
TOTALSCORE: 0
BODYLANGUAGE: RELAXED
BODYLANGUAGE: RELAXED
CONSOLABILITY: NO NEED TO CONSOLE
FACIALEXPRESSION: SMILING OR INEXPRESSIVE
BODYLANGUAGE: RELAXED

## 2025-01-01 ASSESSMENT — PAIN SCALES - GENERAL
PAINLEVEL_OUTOF10: 0

## 2025-01-01 ASSESSMENT — PAIN SCALES - WONG BAKER
WONGBAKER_NUMERICALRESPONSE: NO HURT

## 2025-01-15 NOTE — DISCHARGE INSTRUCTIONS
It was a pleasure taking care of you in our Emergency Department today. We know that when you come to [facility], you are entrusting us with your health, comfort, and safety. Our physicians and nurses honor that trust, and truly appreciate the opportunity to care for you and your loved ones. We also value your feedback. If you receive a survey about your Emergency Department experience today, please fill it out. We care about our patients' feedback, and we listen to what you have to say. Thank you!       Dr. Ted Judd MD.
Never smoker

## 2025-01-18 RX ORDER — INSULIN ASPART 100 [IU]/ML
INJECTION, SOLUTION INTRAVENOUS; SUBCUTANEOUS
Qty: 9 ML | Refills: 3 | Status: SHIPPED | OUTPATIENT
Start: 2025-01-18

## 2025-01-18 RX ORDER — BRIMONIDINE TARTRATE 1.5 MG/ML
SOLUTION/ DROPS OPHTHALMIC
Qty: 5 ML | Refills: 11 | Status: SHIPPED | OUTPATIENT
Start: 2025-01-18

## 2025-02-03 ENCOUNTER — OFFICE VISIT (OUTPATIENT)
Facility: CLINIC | Age: 85
End: 2025-02-03
Payer: MEDICARE

## 2025-02-03 VITALS
BODY MASS INDEX: 24.21 KG/M2 | TEMPERATURE: 98.3 F | OXYGEN SATURATION: 100 % | HEIGHT: 64 IN | SYSTOLIC BLOOD PRESSURE: 94 MMHG | DIASTOLIC BLOOD PRESSURE: 48 MMHG | WEIGHT: 141.8 LBS | HEART RATE: 70 BPM | RESPIRATION RATE: 16 BRPM

## 2025-02-03 DIAGNOSIS — E78.5 DYSLIPIDEMIA: ICD-10-CM

## 2025-02-03 DIAGNOSIS — F01.50 VASCULAR DEMENTIA WITHOUT BEHAVIORAL DISTURBANCE (HCC): Primary | ICD-10-CM

## 2025-02-03 DIAGNOSIS — E10.65 HYPERGLYCEMIA DUE TO TYPE 1 DIABETES MELLITUS (HCC): ICD-10-CM

## 2025-02-03 DIAGNOSIS — I10 PRIMARY HYPERTENSION: ICD-10-CM

## 2025-02-03 DIAGNOSIS — E89.0 POSTABLATIVE HYPOTHYROIDISM: ICD-10-CM

## 2025-02-03 PROCEDURE — 1159F MED LIST DOCD IN RCRD: CPT | Performed by: INTERNAL MEDICINE

## 2025-02-03 PROCEDURE — 3078F DIAST BP <80 MM HG: CPT | Performed by: INTERNAL MEDICINE

## 2025-02-03 PROCEDURE — 1126F AMNT PAIN NOTED NONE PRSNT: CPT | Performed by: INTERNAL MEDICINE

## 2025-02-03 PROCEDURE — 3074F SYST BP LT 130 MM HG: CPT | Performed by: INTERNAL MEDICINE

## 2025-02-03 PROCEDURE — 99214 OFFICE O/P EST MOD 30 MIN: CPT | Performed by: INTERNAL MEDICINE

## 2025-02-03 PROCEDURE — 3046F HEMOGLOBIN A1C LEVEL >9.0%: CPT | Performed by: INTERNAL MEDICINE

## 2025-02-03 PROCEDURE — 36415 COLL VENOUS BLD VENIPUNCTURE: CPT | Performed by: INTERNAL MEDICINE

## 2025-02-03 PROCEDURE — 1123F ACP DISCUSS/DSCN MKR DOCD: CPT | Performed by: INTERNAL MEDICINE

## 2025-02-03 RX ORDER — BRIMONIDINE TARTRATE 1.5 MG/ML
2 SOLUTION/ DROPS OPHTHALMIC DAILY
Qty: 15 ML | Refills: 11 | Status: SHIPPED | OUTPATIENT
Start: 2025-02-03

## 2025-02-03 SDOH — ECONOMIC STABILITY: FOOD INSECURITY: WITHIN THE PAST 12 MONTHS, THE FOOD YOU BOUGHT JUST DIDN'T LAST AND YOU DIDN'T HAVE MONEY TO GET MORE.: NEVER TRUE

## 2025-02-03 SDOH — ECONOMIC STABILITY: FOOD INSECURITY: WITHIN THE PAST 12 MONTHS, YOU WORRIED THAT YOUR FOOD WOULD RUN OUT BEFORE YOU GOT MONEY TO BUY MORE.: NEVER TRUE

## 2025-02-03 ASSESSMENT — ANXIETY QUESTIONNAIRES
5. BEING SO RESTLESS THAT IT IS HARD TO SIT STILL: NOT AT ALL
1. FEELING NERVOUS, ANXIOUS, OR ON EDGE: NOT AT ALL
3. WORRYING TOO MUCH ABOUT DIFFERENT THINGS: NOT AT ALL
4. TROUBLE RELAXING: NOT AT ALL
GAD7 TOTAL SCORE: 0
2. NOT BEING ABLE TO STOP OR CONTROL WORRYING: NOT AT ALL
7. FEELING AFRAID AS IF SOMETHING AWFUL MIGHT HAPPEN: NOT AT ALL
6. BECOMING EASILY ANNOYED OR IRRITABLE: NOT AT ALL
IF YOU CHECKED OFF ANY PROBLEMS ON THIS QUESTIONNAIRE, HOW DIFFICULT HAVE THESE PROBLEMS MADE IT FOR YOU TO DO YOUR WORK, TAKE CARE OF THINGS AT HOME, OR GET ALONG WITH OTHER PEOPLE: NOT DIFFICULT AT ALL

## 2025-02-03 ASSESSMENT — PATIENT HEALTH QUESTIONNAIRE - PHQ9
SUM OF ALL RESPONSES TO PHQ9 QUESTIONS 1 & 2: 0
SUM OF ALL RESPONSES TO PHQ QUESTIONS 1-9: 0
SUM OF ALL RESPONSES TO PHQ QUESTIONS 1-9: 0
1. LITTLE INTEREST OR PLEASURE IN DOING THINGS: NOT AT ALL
2. FEELING DOWN, DEPRESSED OR HOPELESS: NOT AT ALL
SUM OF ALL RESPONSES TO PHQ QUESTIONS 1-9: 0
SUM OF ALL RESPONSES TO PHQ QUESTIONS 1-9: 0

## 2025-02-03 NOTE — PROGRESS NOTES
Chief Complaint   Patient presents with    Diabetes    Hypertension     Patient states \" she is present for a follow-up.\"      \"Have you been to the ER, urgent care clinic since your last visit?  Hospitalized since your last visit?\"    NO    “Have you seen or consulted any other health care providers outside our system since your last visit?”    NO      “Have you had a diabetic eye exam?”    NO     Date of last diabetic eye exam: 5/8/2015

## 2025-02-04 LAB
ALBUMIN SERPL-MCNC: 3.6 G/DL (ref 3.5–5)
ALBUMIN/GLOB SERPL: 1 (ref 1.1–2.2)
ALP SERPL-CCNC: 105 U/L (ref 45–117)
ALT SERPL-CCNC: 43 U/L (ref 12–78)
ANION GAP SERPL CALC-SCNC: 7 MMOL/L (ref 2–12)
AST SERPL-CCNC: 24 U/L (ref 15–37)
BASOPHILS # BLD: 0.03 K/UL (ref 0–0.1)
BASOPHILS NFR BLD: 0.6 % (ref 0–1)
BILIRUB SERPL-MCNC: 0.3 MG/DL (ref 0.2–1)
BUN SERPL-MCNC: 31 MG/DL (ref 6–20)
BUN/CREAT SERPL: 26 (ref 12–20)
CALCIUM SERPL-MCNC: 9.3 MG/DL (ref 8.5–10.1)
CHLORIDE SERPL-SCNC: 105 MMOL/L (ref 97–108)
CHOLEST SERPL-MCNC: 163 MG/DL
CO2 SERPL-SCNC: 26 MMOL/L (ref 21–32)
CREAT SERPL-MCNC: 1.19 MG/DL (ref 0.55–1.02)
CRP SERPL HS-MCNC: 2.3 MG/L
DIFFERENTIAL METHOD BLD: NORMAL
EOSINOPHIL # BLD: 0.08 K/UL (ref 0–0.4)
EOSINOPHIL NFR BLD: 1.7 % (ref 0–7)
ERYTHROCYTE [DISTWIDTH] IN BLOOD BY AUTOMATED COUNT: 13.2 % (ref 11.5–14.5)
EST. AVERAGE GLUCOSE BLD GHB EST-MCNC: 217 MG/DL
GLOBULIN SER CALC-MCNC: 3.6 G/DL (ref 2–4)
GLUCOSE SERPL-MCNC: 276 MG/DL (ref 65–100)
HBA1C MFR BLD: 9.2 % (ref 4–5.6)
HCT VFR BLD AUTO: 39.6 % (ref 35–47)
HDLC SERPL-MCNC: 101 MG/DL
HDLC SERPL: 1.6 (ref 0–5)
HGB BLD-MCNC: 12.9 G/DL (ref 11.5–16)
IMM GRANULOCYTES # BLD AUTO: 0.01 K/UL (ref 0–0.04)
IMM GRANULOCYTES NFR BLD AUTO: 0.2 % (ref 0–0.5)
LDLC SERPL CALC-MCNC: 53.2 MG/DL (ref 0–100)
LYMPHOCYTES # BLD: 1.16 K/UL (ref 0.8–3.5)
LYMPHOCYTES NFR BLD: 24.7 % (ref 12–49)
MCH RBC QN AUTO: 31.3 PG (ref 26–34)
MCHC RBC AUTO-ENTMCNC: 32.6 G/DL (ref 30–36.5)
MCV RBC AUTO: 96.1 FL (ref 80–99)
MONOCYTES # BLD: 0.48 K/UL (ref 0–1)
MONOCYTES NFR BLD: 10.2 % (ref 5–13)
NEUTS SEG # BLD: 2.94 K/UL (ref 1.8–8)
NEUTS SEG NFR BLD: 62.6 % (ref 32–75)
NRBC # BLD: 0 K/UL (ref 0–0.01)
NRBC BLD-RTO: 0 PER 100 WBC
PLATELET # BLD AUTO: 265 K/UL (ref 150–400)
PMV BLD AUTO: 9.9 FL (ref 8.9–12.9)
POTASSIUM SERPL-SCNC: 4.1 MMOL/L (ref 3.5–5.1)
PROT SERPL-MCNC: 7.2 G/DL (ref 6.4–8.2)
RBC # BLD AUTO: 4.12 M/UL (ref 3.8–5.2)
SODIUM SERPL-SCNC: 138 MMOL/L (ref 136–145)
TRIGL SERPL-MCNC: 44 MG/DL
TSH SERPL DL<=0.05 MIU/L-ACNC: 0.81 UIU/ML (ref 0.36–3.74)
VLDLC SERPL CALC-MCNC: 8.8 MG/DL
WBC # BLD AUTO: 4.7 K/UL (ref 3.6–11)

## 2025-02-05 LAB — APO B SERPL-MCNC: 55 MG/DL

## 2025-03-03 ENCOUNTER — HOSPITAL ENCOUNTER (EMERGENCY)
Facility: HOSPITAL | Age: 85
Discharge: HOME | End: 2025-03-03
Attending: EMERGENCY MEDICINE
Payer: MEDICARE

## 2025-03-03 ENCOUNTER — APPOINTMENT (OUTPATIENT)
Facility: HOSPITAL | Age: 85
End: 2025-03-03
Payer: MEDICARE

## 2025-03-03 VITALS
SYSTOLIC BLOOD PRESSURE: 158 MMHG | OXYGEN SATURATION: 96 % | TEMPERATURE: 98.2 F | DIASTOLIC BLOOD PRESSURE: 72 MMHG | RESPIRATION RATE: 17 BRPM | HEART RATE: 106 BPM

## 2025-03-03 DIAGNOSIS — R73.9 HYPERGLYCEMIA: Primary | ICD-10-CM

## 2025-03-03 DIAGNOSIS — E86.0 DEHYDRATION: ICD-10-CM

## 2025-03-03 LAB
ALBUMIN SERPL-MCNC: 3.3 G/DL (ref 3.5–5)
ALBUMIN/GLOB SERPL: 0.8 (ref 1.1–2.2)
ALP SERPL-CCNC: 124 U/L (ref 45–117)
ALT SERPL-CCNC: 90 U/L (ref 12–78)
ANION GAP SERPL CALC-SCNC: 9 MMOL/L (ref 2–12)
APPEARANCE UR: CLEAR
AST SERPL-CCNC: 84 U/L (ref 15–37)
BACTERIA URNS QL MICRO: ABNORMAL /HPF
BASOPHILS # BLD: 0.03 K/UL (ref 0–0.1)
BASOPHILS NFR BLD: 0.2 % (ref 0–1)
BILIRUB SERPL-MCNC: 0.5 MG/DL (ref 0.2–1)
BILIRUB UR QL: NEGATIVE
BUN SERPL-MCNC: 32 MG/DL (ref 6–20)
BUN/CREAT SERPL: 21 (ref 12–20)
CALCIUM SERPL-MCNC: 8.7 MG/DL (ref 8.5–10.1)
CHLORIDE SERPL-SCNC: 94 MMOL/L (ref 97–108)
CO2 SERPL-SCNC: 29 MMOL/L (ref 21–32)
COLOR UR: ABNORMAL
COMMENT:: NORMAL
CREAT SERPL-MCNC: 1.52 MG/DL (ref 0.55–1.02)
DIFFERENTIAL METHOD BLD: ABNORMAL
EKG ATRIAL RATE: 92 BPM
EKG DIAGNOSIS: NORMAL
EKG P AXIS: 49 DEGREES
EKG P-R INTERVAL: 142 MS
EKG Q-T INTERVAL: 374 MS
EKG QRS DURATION: 84 MS
EKG QTC CALCULATION (BAZETT): 462 MS
EKG R AXIS: -39 DEGREES
EKG T AXIS: -58 DEGREES
EKG VENTRICULAR RATE: 92 BPM
EOSINOPHIL # BLD: 0 K/UL (ref 0–0.4)
EOSINOPHIL NFR BLD: 0 % (ref 0–7)
EPITH CASTS URNS QL MICRO: ABNORMAL /LPF
ERYTHROCYTE [DISTWIDTH] IN BLOOD BY AUTOMATED COUNT: 12.4 % (ref 11.5–14.5)
GLOBULIN SER CALC-MCNC: 4.3 G/DL (ref 2–4)
GLUCOSE BLD STRIP.AUTO-MCNC: 319 MG/DL (ref 65–117)
GLUCOSE BLD STRIP.AUTO-MCNC: 320 MG/DL (ref 65–117)
GLUCOSE BLD STRIP.AUTO-MCNC: 523 MG/DL (ref 65–117)
GLUCOSE SERPL-MCNC: 459 MG/DL (ref 65–100)
GLUCOSE UR STRIP.AUTO-MCNC: >1000 MG/DL
HCT VFR BLD AUTO: 40.3 % (ref 35–47)
HGB BLD-MCNC: 13.9 G/DL (ref 11.5–16)
HGB UR QL STRIP: NEGATIVE
HYALINE CASTS URNS QL MICRO: ABNORMAL /LPF (ref 0–5)
IMM GRANULOCYTES # BLD AUTO: 0.09 K/UL (ref 0–0.04)
IMM GRANULOCYTES NFR BLD AUTO: 0.7 % (ref 0–0.5)
KETONES UR QL STRIP.AUTO: ABNORMAL MG/DL
LEUKOCYTE ESTERASE UR QL STRIP.AUTO: NEGATIVE
LYMPHOCYTES # BLD: 0.49 K/UL (ref 0.8–3.5)
LYMPHOCYTES NFR BLD: 3.8 % (ref 12–49)
MAGNESIUM SERPL-MCNC: 2.3 MG/DL (ref 1.6–2.4)
MCH RBC QN AUTO: 31.3 PG (ref 26–34)
MCHC RBC AUTO-ENTMCNC: 34.5 G/DL (ref 30–36.5)
MCV RBC AUTO: 90.8 FL (ref 80–99)
MONOCYTES # BLD: 0.77 K/UL (ref 0–1)
MONOCYTES NFR BLD: 6 % (ref 5–13)
MUCOUS THREADS URNS QL MICRO: ABNORMAL /LPF
NEUTS SEG # BLD: 11.42 K/UL (ref 1.8–8)
NEUTS SEG NFR BLD: 89.3 % (ref 32–75)
NITRITE UR QL STRIP.AUTO: NEGATIVE
NRBC # BLD: 0 K/UL (ref 0–0.01)
NRBC BLD-RTO: 0 PER 100 WBC
PH UR STRIP: 5.5 (ref 5–8)
PLATELET # BLD AUTO: 181 K/UL (ref 150–400)
PMV BLD AUTO: 10 FL (ref 8.9–12.9)
POTASSIUM SERPL-SCNC: 3.8 MMOL/L (ref 3.5–5.1)
PROT SERPL-MCNC: 7.6 G/DL (ref 6.4–8.2)
PROT UR STRIP-MCNC: 30 MG/DL
RBC # BLD AUTO: 4.44 M/UL (ref 3.8–5.2)
RBC #/AREA URNS HPF: ABNORMAL /HPF (ref 0–5)
RBC MORPH BLD: ABNORMAL
SERVICE CMNT-IMP: ABNORMAL
SODIUM SERPL-SCNC: 132 MMOL/L (ref 136–145)
SP GR UR REFRACTOMETRY: 1.03
SPECIMEN HOLD: NORMAL
TROPONIN I SERPL HS-MCNC: 11 NG/L (ref 0–51)
URINE CULTURE IF INDICATED: ABNORMAL
UROBILINOGEN UR QL STRIP.AUTO: 0.2 EU/DL (ref 0.2–1)
WBC # BLD AUTO: 12.8 K/UL (ref 3.6–11)
WBC URNS QL MICRO: ABNORMAL /HPF (ref 0–4)

## 2025-03-03 PROCEDURE — 82962 GLUCOSE BLOOD TEST: CPT

## 2025-03-03 PROCEDURE — 6370000000 HC RX 637 (ALT 250 FOR IP): Performed by: EMERGENCY MEDICINE

## 2025-03-03 PROCEDURE — 83735 ASSAY OF MAGNESIUM: CPT

## 2025-03-03 PROCEDURE — 93005 ELECTROCARDIOGRAM TRACING: CPT | Performed by: EMERGENCY MEDICINE

## 2025-03-03 PROCEDURE — 96361 HYDRATE IV INFUSION ADD-ON: CPT

## 2025-03-03 PROCEDURE — 71045 X-RAY EXAM CHEST 1 VIEW: CPT

## 2025-03-03 PROCEDURE — 96374 THER/PROPH/DIAG INJ IV PUSH: CPT

## 2025-03-03 PROCEDURE — 36415 COLL VENOUS BLD VENIPUNCTURE: CPT

## 2025-03-03 PROCEDURE — 85025 COMPLETE CBC W/AUTO DIFF WBC: CPT

## 2025-03-03 PROCEDURE — 84484 ASSAY OF TROPONIN QUANT: CPT

## 2025-03-03 PROCEDURE — 99285 EMERGENCY DEPT VISIT HI MDM: CPT

## 2025-03-03 PROCEDURE — 2580000003 HC RX 258: Performed by: EMERGENCY MEDICINE

## 2025-03-03 PROCEDURE — 81001 URINALYSIS AUTO W/SCOPE: CPT

## 2025-03-03 PROCEDURE — 80053 COMPREHEN METABOLIC PANEL: CPT

## 2025-03-03 RX ORDER — 0.9 % SODIUM CHLORIDE 0.9 %
2000 INTRAVENOUS SOLUTION INTRAVENOUS ONCE
Status: COMPLETED | OUTPATIENT
Start: 2025-03-03 | End: 2025-03-03

## 2025-03-03 RX ADMIN — SODIUM CHLORIDE 2000 ML: 0.9 INJECTION, SOLUTION INTRAVENOUS at 09:25

## 2025-03-03 RX ADMIN — INSULIN HUMAN 4 UNITS: 100 INJECTION, SOLUTION PARENTERAL at 12:12

## 2025-03-03 ASSESSMENT — PAIN DESCRIPTION - PAIN TYPE: TYPE: ACUTE PAIN

## 2025-03-03 ASSESSMENT — PAIN SCALES - GENERAL: PAINLEVEL_OUTOF10: 0

## 2025-03-03 ASSESSMENT — LIFESTYLE VARIABLES
HOW OFTEN DO YOU HAVE A DRINK CONTAINING ALCOHOL: PATIENT UNABLE TO ANSWER
HOW MANY STANDARD DRINKS CONTAINING ALCOHOL DO YOU HAVE ON A TYPICAL DAY: PATIENT UNABLE TO ANSWER

## 2025-03-03 ASSESSMENT — PAIN - FUNCTIONAL ASSESSMENT: PAIN_FUNCTIONAL_ASSESSMENT: 0-10

## 2025-03-03 NOTE — CARE COORDINATION
CM Consulted by Dr. Mills for notifying family for pt discharge - Chart Reviewed.    Anoop Reid: [phone] (not in service), [phone] mailbox is full    Vitaly Mathews: [phone] (phone message statesMonica Shen is not available, CM left message)    Eulogio Mathews [phone] (LM, no greeting)    Monica Shen @ [phone] - advised she would be the family's contact. CM provided Dr. Mills with Monica's contact number to update daughter of medical summary for today and pt returning home and review follow up care.    Brenda Throckmorton RN  [facility] Case Management  /[phone]  [phone]

## 2025-03-07 ENCOUNTER — TELEPHONE (OUTPATIENT)
Facility: CLINIC | Age: 85
End: 2025-03-07

## 2025-03-07 NOTE — TELEPHONE ENCOUNTER
Monica Shen ([phone]) daughter of patient called stating that she had been in the ER because blood sugar was 500. Sugar is still staying up to 200. Pt is not eating, chewing bread and spitting it out. Will not eat regular food. She is drinking and laying back down also says she is talking and laughing. Wants advice on pt situation. Thanks

## 2025-03-28 RX ORDER — BENZTROPINE MESYLATE 1 MG/1
1 TABLET ORAL 2 TIMES DAILY
Qty: 180 TABLET | Refills: 3 | Status: SHIPPED | OUTPATIENT
Start: 2025-03-28

## 2025-03-31 ENCOUNTER — APPOINTMENT (OUTPATIENT)
Facility: HOSPITAL | Age: 85
DRG: 638 | End: 2025-03-31
Payer: MEDICARE

## 2025-03-31 ENCOUNTER — HOSPITAL ENCOUNTER (EMERGENCY)
Facility: HOSPITAL | Age: 85
Discharge: HOME | DRG: 638 | End: 2025-04-01
Attending: STUDENT IN AN ORGANIZED HEALTH CARE EDUCATION/TRAINING PROGRAM
Payer: MEDICARE

## 2025-03-31 DIAGNOSIS — E16.2 HYPOGLYCEMIA: Primary | ICD-10-CM

## 2025-03-31 LAB
ALBUMIN SERPL-MCNC: 2.8 G/DL (ref 3.5–5)
ALBUMIN/GLOB SERPL: 0.5 (ref 1.1–2.2)
ALP SERPL-CCNC: 95 U/L (ref 45–117)
ALT SERPL-CCNC: 19 U/L (ref 12–78)
ANION GAP SERPL CALC-SCNC: 7 MMOL/L (ref 2–12)
AST SERPL-CCNC: 24 U/L (ref 15–37)
BASOPHILS # BLD: 0.01 K/UL (ref 0–0.1)
BASOPHILS NFR BLD: 0.2 % (ref 0–1)
BILIRUB SERPL-MCNC: 0.4 MG/DL (ref 0.2–1)
BUN SERPL-MCNC: 17 MG/DL (ref 6–20)
BUN/CREAT SERPL: 22 (ref 12–20)
CALCIUM SERPL-MCNC: 8.9 MG/DL (ref 8.5–10.1)
CHLORIDE SERPL-SCNC: 100 MMOL/L (ref 97–108)
CO2 SERPL-SCNC: 29 MMOL/L (ref 21–32)
CREAT SERPL-MCNC: 0.78 MG/DL (ref 0.55–1.02)
DIFFERENTIAL METHOD BLD: ABNORMAL
EOSINOPHIL # BLD: 0.02 K/UL (ref 0–0.4)
EOSINOPHIL NFR BLD: 0.3 % (ref 0–7)
ERYTHROCYTE [DISTWIDTH] IN BLOOD BY AUTOMATED COUNT: 14.3 % (ref 11.5–14.5)
GLOBULIN SER CALC-MCNC: 5.4 G/DL (ref 2–4)
GLUCOSE BLD STRIP.AUTO-MCNC: 117 MG/DL (ref 65–117)
GLUCOSE SERPL-MCNC: 86 MG/DL (ref 65–100)
HCT VFR BLD AUTO: 35.7 % (ref 35–47)
HGB BLD-MCNC: 11.8 G/DL (ref 11.5–16)
IMM GRANULOCYTES # BLD AUTO: 0.02 K/UL (ref 0–0.04)
IMM GRANULOCYTES NFR BLD AUTO: 0.3 % (ref 0–0.5)
LYMPHOCYTES # BLD: 0.78 K/UL (ref 0.8–3.5)
LYMPHOCYTES NFR BLD: 13.3 % (ref 12–49)
MCH RBC QN AUTO: 31.4 PG (ref 26–34)
MCHC RBC AUTO-ENTMCNC: 33.1 G/DL (ref 30–36.5)
MCV RBC AUTO: 94.9 FL (ref 80–99)
MONOCYTES # BLD: 0.34 K/UL (ref 0–1)
MONOCYTES NFR BLD: 5.7 % (ref 5–13)
NEUTS SEG # BLD: 4.73 K/UL (ref 1.8–8)
NEUTS SEG NFR BLD: 80.2 % (ref 32–75)
NRBC # BLD: 0 K/UL (ref 0–0.01)
NRBC BLD-RTO: 0 PER 100 WBC
PLATELET # BLD AUTO: 277 K/UL (ref 150–400)
PMV BLD AUTO: 8.2 FL (ref 8.9–12.9)
POTASSIUM SERPL-SCNC: 3.2 MMOL/L (ref 3.5–5.1)
PROT SERPL-MCNC: 8.2 G/DL (ref 6.4–8.2)
RBC # BLD AUTO: 3.76 M/UL (ref 3.8–5.2)
RBC MORPH BLD: ABNORMAL
SERVICE CMNT-IMP: NORMAL
SODIUM SERPL-SCNC: 136 MMOL/L (ref 136–145)
WBC # BLD AUTO: 5.9 K/UL (ref 3.6–11)

## 2025-03-31 PROCEDURE — 80053 COMPREHEN METABOLIC PANEL: CPT

## 2025-03-31 PROCEDURE — 85025 COMPLETE CBC W/AUTO DIFF WBC: CPT

## 2025-03-31 PROCEDURE — 70450 CT HEAD/BRAIN W/O DYE: CPT

## 2025-03-31 PROCEDURE — 82962 GLUCOSE BLOOD TEST: CPT

## 2025-03-31 PROCEDURE — 36415 COLL VENOUS BLD VENIPUNCTURE: CPT

## 2025-03-31 ASSESSMENT — PAIN SCALES - GENERAL: PAINLEVEL_OUTOF10: 0

## 2025-03-31 NOTE — ED PROVIDER NOTES
[facility] EMERGENCY DEPARTMENT  EMERGENCY DEPARTMENT ENCOUNTER       Pt Name: Brooklynn Mathews  MRN: [id number]  Birthdate 1940  Date of evaluation: 3/31/2025  Provider: Rufus Mcclendon DO   PCP: Isac Otoole MD  Note Started: 7:59 PM 3/31/25     CHIEF COMPLAINT       Chief Complaint   Patient presents with    Hypoglycemia     Pt arrives via EMS from home for c/o hypoglycemia. Pts grandaughter called EMS and reported it was up and down all day. EMS found pt BG to be in the 60s, gave oral glucose, and it raised. During triage . Pt alert and oriented to her BL per EMS, h/o dementia and T2DM.         HISTORY OF PRESENT ILLNESS: 1 or more elements      History From: Patient  History limited by dementia      Brooklynn Mathews is a 84 y.o. female who presents with hypoglycemia. Per granddaughter who called patient's BG has been \"up and down\" all day. Patient denies any complaints. EMS found patient with BG in the 60s, gave oral glucose. Meeraetn reports poor Po intake today and states she is hungry.     Nursing Notes were all reviewed and agreed with or any disagreements were addressed in the HPI.       PAST HISTORY     Past Medical History:  Past Medical History:   Diagnosis Date    Diabetes (HCC)     Heart failure (HCC)     unknown to family    HLD (hyperlipidemia)     Hypertension     Hypothyroidism     Stroke (HCC)          Past Surgical History:  Past Surgical History:   Procedure Laterality Date    GYN      HEENT      thyroidectomy    HYSTERECTOMY (CERVIX STATUS UNKNOWN)      REMOVAL GALLBLADDER      THYROIDECTOMY         Family History:  Family History   Problem Relation Age of Onset    Diabetes Father     No Known Problems Mother        Social History:  Social History     Tobacco Use    Smoking status: Never     Passive exposure: Never    Smokeless tobacco: Never   Vaping Use    Vaping status: Never Used   Substance Use Topics    Alcohol use: No    Drug use: No       Allergies:  No Known  Rufus MONAE DO  04/01/25 1112

## 2025-04-01 ENCOUNTER — HOSPITAL ENCOUNTER (INPATIENT)
Facility: HOSPITAL | Age: 85
LOS: 7 days | Discharge: HOME | DRG: 638 | End: 2025-04-09
Attending: STUDENT IN AN ORGANIZED HEALTH CARE EDUCATION/TRAINING PROGRAM | Admitting: STUDENT IN AN ORGANIZED HEALTH CARE EDUCATION/TRAINING PROGRAM
Payer: MEDICARE

## 2025-04-01 VITALS
HEIGHT: 64 IN | WEIGHT: 145 LBS | HEART RATE: 82 BPM | RESPIRATION RATE: 17 BRPM | TEMPERATURE: 97.4 F | BODY MASS INDEX: 24.75 KG/M2 | DIASTOLIC BLOOD PRESSURE: 82 MMHG | OXYGEN SATURATION: 95 % | SYSTOLIC BLOOD PRESSURE: 121 MMHG

## 2025-04-01 DIAGNOSIS — E16.2 HYPOGLYCEMIA: Primary | ICD-10-CM

## 2025-04-01 LAB
ALBUMIN SERPL-MCNC: 2.6 G/DL (ref 3.5–5)
ALBUMIN/GLOB SERPL: 0.5 (ref 1.1–2.2)
ALP SERPL-CCNC: 86 U/L (ref 45–117)
ALT SERPL-CCNC: 20 U/L (ref 12–78)
ANION GAP SERPL CALC-SCNC: 1 MMOL/L (ref 2–12)
AST SERPL-CCNC: 29 U/L (ref 15–37)
BASOPHILS # BLD: 0.02 K/UL (ref 0–0.1)
BASOPHILS NFR BLD: 0.3 % (ref 0–1)
BILIRUB SERPL-MCNC: 0.3 MG/DL (ref 0.2–1)
BUN SERPL-MCNC: 17 MG/DL (ref 6–20)
BUN/CREAT SERPL: 26 (ref 12–20)
CALCIUM SERPL-MCNC: 8.8 MG/DL (ref 8.5–10.1)
CHLORIDE SERPL-SCNC: 103 MMOL/L (ref 97–108)
CO2 SERPL-SCNC: 31 MMOL/L (ref 21–32)
CREAT SERPL-MCNC: 0.65 MG/DL (ref 0.55–1.02)
DIFFERENTIAL METHOD BLD: ABNORMAL
EOSINOPHIL # BLD: 0.03 K/UL (ref 0–0.4)
EOSINOPHIL NFR BLD: 0.5 % (ref 0–7)
ERYTHROCYTE [DISTWIDTH] IN BLOOD BY AUTOMATED COUNT: 14.4 % (ref 11.5–14.5)
GLOBULIN SER CALC-MCNC: 4.8 G/DL (ref 2–4)
GLUCOSE BLD STRIP.AUTO-MCNC: 216 MG/DL (ref 65–117)
GLUCOSE BLD STRIP.AUTO-MCNC: 55 MG/DL (ref 65–117)
GLUCOSE BLD STRIP.AUTO-MCNC: 86 MG/DL (ref 65–117)
GLUCOSE SERPL-MCNC: 52 MG/DL (ref 65–100)
HCT VFR BLD AUTO: 35.8 % (ref 35–47)
HGB BLD-MCNC: 12 G/DL (ref 11.5–16)
IMM GRANULOCYTES # BLD AUTO: 0.02 K/UL (ref 0–0.04)
IMM GRANULOCYTES NFR BLD AUTO: 0.3 % (ref 0–0.5)
LYMPHOCYTES # BLD: 0.94 K/UL (ref 0.8–3.5)
LYMPHOCYTES NFR BLD: 15.9 % (ref 12–49)
MCH RBC QN AUTO: 31.7 PG (ref 26–34)
MCHC RBC AUTO-ENTMCNC: 33.5 G/DL (ref 30–36.5)
MCV RBC AUTO: 94.5 FL (ref 80–99)
MONOCYTES # BLD: 0.4 K/UL (ref 0–1)
MONOCYTES NFR BLD: 6.7 % (ref 5–13)
NEUTS SEG # BLD: 4.52 K/UL (ref 1.8–8)
NEUTS SEG NFR BLD: 76.3 % (ref 32–75)
NRBC # BLD: 0 K/UL (ref 0–0.01)
NRBC BLD-RTO: 0 PER 100 WBC
PLATELET # BLD AUTO: 288 K/UL (ref 150–400)
PMV BLD AUTO: 8.7 FL (ref 8.9–12.9)
POTASSIUM SERPL-SCNC: 3.7 MMOL/L (ref 3.5–5.1)
PROT SERPL-MCNC: 7.4 G/DL (ref 6.4–8.2)
RBC # BLD AUTO: 3.79 M/UL (ref 3.8–5.2)
SERVICE CMNT-IMP: ABNORMAL
SERVICE CMNT-IMP: ABNORMAL
SERVICE CMNT-IMP: NORMAL
SODIUM SERPL-SCNC: 135 MMOL/L (ref 136–145)
WBC # BLD AUTO: 5.9 K/UL (ref 3.6–11)

## 2025-04-01 PROCEDURE — 80053 COMPREHEN METABOLIC PANEL: CPT

## 2025-04-01 PROCEDURE — 82962 GLUCOSE BLOOD TEST: CPT

## 2025-04-01 PROCEDURE — 99285 EMERGENCY DEPT VISIT HI MDM: CPT

## 2025-04-01 PROCEDURE — 96365 THER/PROPH/DIAG IV INF INIT: CPT

## 2025-04-01 PROCEDURE — 36415 COLL VENOUS BLD VENIPUNCTURE: CPT

## 2025-04-01 PROCEDURE — 85025 COMPLETE CBC W/AUTO DIFF WBC: CPT

## 2025-04-01 ASSESSMENT — PAIN - FUNCTIONAL ASSESSMENT: PAIN_FUNCTIONAL_ASSESSMENT: NONE - DENIES PAIN

## 2025-04-01 NOTE — ED NOTES
Patient's brief and linens changed at this time. Purewick in place to collect urine sample, patient states she is able to urinate at this time. Discussed catheterization. Provider notified.

## 2025-04-01 NOTE — DISCHARGE INSTRUCTIONS
Please make sure Brooklynn is eating when she takes her medication. Her BG drop can be due to her not eating/

## 2025-04-01 NOTE — ED NOTES
Report received from [facility]. They advised of the patient's chief complaint, current status, orders completed (to include IV access/medications/radiology testing), outstanding orders that still need to be completed, and the treatment plan. Questions asked and answered prior to assumption of care.

## 2025-04-02 LAB
APPEARANCE UR: CLEAR
BACTERIA URNS QL MICRO: NEGATIVE /HPF
BILIRUB UR QL: NEGATIVE
COLOR UR: ABNORMAL
EPITH CASTS URNS QL MICRO: ABNORMAL /LPF
GLUCOSE BLD STRIP.AUTO-MCNC: 124 MG/DL (ref 65–117)
GLUCOSE BLD STRIP.AUTO-MCNC: 129 MG/DL (ref 65–117)
GLUCOSE BLD STRIP.AUTO-MCNC: 227 MG/DL (ref 65–117)
GLUCOSE BLD STRIP.AUTO-MCNC: 90 MG/DL (ref 65–117)
GLUCOSE UR STRIP.AUTO-MCNC: NEGATIVE MG/DL
HGB UR QL STRIP: NEGATIVE
HYALINE CASTS URNS QL MICRO: ABNORMAL /LPF (ref 0–2)
KETONES UR QL STRIP.AUTO: NEGATIVE MG/DL
LEUKOCYTE ESTERASE UR QL STRIP.AUTO: NEGATIVE
NITRITE UR QL STRIP.AUTO: NEGATIVE
PH UR STRIP: 7 (ref 5–8)
PROT UR STRIP-MCNC: NEGATIVE MG/DL
RBC #/AREA URNS HPF: ABNORMAL /HPF (ref 0–5)
SERVICE CMNT-IMP: ABNORMAL
SERVICE CMNT-IMP: NORMAL
SP GR UR REFRACTOMETRY: 1.02
URINE CULTURE IF INDICATED: ABNORMAL
UROBILINOGEN UR QL STRIP.AUTO: 1 EU/DL (ref 0.2–1)
WBC URNS QL MICRO: ABNORMAL /HPF (ref 0–4)

## 2025-04-02 PROCEDURE — 81001 URINALYSIS AUTO W/SCOPE: CPT

## 2025-04-02 PROCEDURE — 6360000002 HC RX W HCPCS: Performed by: STUDENT IN AN ORGANIZED HEALTH CARE EDUCATION/TRAINING PROGRAM

## 2025-04-02 PROCEDURE — 2500000003 HC RX 250 WO HCPCS: Performed by: STUDENT IN AN ORGANIZED HEALTH CARE EDUCATION/TRAINING PROGRAM

## 2025-04-02 PROCEDURE — 2580000003 HC RX 258: Performed by: STUDENT IN AN ORGANIZED HEALTH CARE EDUCATION/TRAINING PROGRAM

## 2025-04-02 PROCEDURE — 6370000000 HC RX 637 (ALT 250 FOR IP): Performed by: STUDENT IN AN ORGANIZED HEALTH CARE EDUCATION/TRAINING PROGRAM

## 2025-04-02 PROCEDURE — 1100000003 HC PRIVATE W/ TELEMETRY

## 2025-04-02 PROCEDURE — 82962 GLUCOSE BLOOD TEST: CPT

## 2025-04-02 PROCEDURE — 6360000002 HC RX W HCPCS: Performed by: INTERNAL MEDICINE

## 2025-04-02 RX ORDER — ACETAMINOPHEN 325 MG/1
650 TABLET ORAL EVERY 6 HOURS PRN
Status: DISCONTINUED | OUTPATIENT
Start: 2025-04-02 | End: 2025-04-09 | Stop reason: HOSPADM

## 2025-04-02 RX ORDER — SODIUM CHLORIDE 9 MG/ML
INJECTION, SOLUTION INTRAVENOUS PRN
Status: DISCONTINUED | OUTPATIENT
Start: 2025-04-02 | End: 2025-04-09 | Stop reason: HOSPADM

## 2025-04-02 RX ORDER — GLUCAGON 1 MG/ML
1 KIT INJECTION PRN
Status: DISCONTINUED | OUTPATIENT
Start: 2025-04-02 | End: 2025-04-09 | Stop reason: HOSPADM

## 2025-04-02 RX ORDER — METOPROLOL SUCCINATE 25 MG/1
25 TABLET, EXTENDED RELEASE ORAL DAILY
Status: DISCONTINUED | OUTPATIENT
Start: 2025-04-02 | End: 2025-04-09 | Stop reason: HOSPADM

## 2025-04-02 RX ORDER — LOSARTAN POTASSIUM AND HYDROCHLOROTHIAZIDE 25; 100 MG/1; MG/1
1 TABLET ORAL DAILY
Status: DISCONTINUED | OUTPATIENT
Start: 2025-04-02 | End: 2025-04-02

## 2025-04-02 RX ORDER — BRIMONIDINE TARTRATE 2 MG/ML
1 SOLUTION/ DROPS OPHTHALMIC 2 TIMES DAILY
Status: DISCONTINUED | OUTPATIENT
Start: 2025-04-02 | End: 2025-04-09 | Stop reason: HOSPADM

## 2025-04-02 RX ORDER — ONDANSETRON 2 MG/ML
4 INJECTION INTRAMUSCULAR; INTRAVENOUS EVERY 6 HOURS PRN
Status: DISCONTINUED | OUTPATIENT
Start: 2025-04-02 | End: 2025-04-09 | Stop reason: HOSPADM

## 2025-04-02 RX ORDER — ACETAMINOPHEN 650 MG/1
650 SUPPOSITORY RECTAL EVERY 6 HOURS PRN
Status: DISCONTINUED | OUTPATIENT
Start: 2025-04-02 | End: 2025-04-09 | Stop reason: HOSPADM

## 2025-04-02 RX ORDER — BENZTROPINE MESYLATE 1 MG/1
1 TABLET ORAL 2 TIMES DAILY
Status: DISCONTINUED | OUTPATIENT
Start: 2025-04-02 | End: 2025-04-09 | Stop reason: HOSPADM

## 2025-04-02 RX ORDER — LOSARTAN POTASSIUM 100 MG/1
100 TABLET ORAL DAILY
Status: DISCONTINUED | OUTPATIENT
Start: 2025-04-02 | End: 2025-04-04

## 2025-04-02 RX ORDER — ENOXAPARIN SODIUM 100 MG/ML
40 INJECTION SUBCUTANEOUS DAILY
Status: DISCONTINUED | OUTPATIENT
Start: 2025-04-02 | End: 2025-04-09 | Stop reason: HOSPADM

## 2025-04-02 RX ORDER — HYDROCHLOROTHIAZIDE 25 MG/1
25 TABLET ORAL DAILY
Status: DISCONTINUED | OUTPATIENT
Start: 2025-04-02 | End: 2025-04-04

## 2025-04-02 RX ORDER — DEXTROSE, SODIUM CHLORIDE, SODIUM LACTATE, POTASSIUM CHLORIDE, AND CALCIUM CHLORIDE 5; .6; .31; .03; .02 G/100ML; G/100ML; G/100ML; G/100ML; G/100ML
INJECTION, SOLUTION INTRAVENOUS CONTINUOUS
Status: DISCONTINUED | OUTPATIENT
Start: 2025-04-02 | End: 2025-04-04

## 2025-04-02 RX ORDER — PRAVASTATIN SODIUM 40 MG
80 TABLET ORAL DAILY
Status: DISCONTINUED | OUTPATIENT
Start: 2025-04-02 | End: 2025-04-09 | Stop reason: HOSPADM

## 2025-04-02 RX ORDER — HALOPERIDOL 5 MG/ML
2 INJECTION INTRAMUSCULAR EVERY 6 HOURS PRN
Status: DISCONTINUED | OUTPATIENT
Start: 2025-04-02 | End: 2025-04-09 | Stop reason: HOSPADM

## 2025-04-02 RX ORDER — DEXTROSE MONOHYDRATE 100 MG/ML
INJECTION, SOLUTION INTRAVENOUS CONTINUOUS PRN
Status: DISCONTINUED | OUTPATIENT
Start: 2025-04-02 | End: 2025-04-09 | Stop reason: HOSPADM

## 2025-04-02 RX ORDER — SODIUM CHLORIDE 0.9 % (FLUSH) 0.9 %
5-40 SYRINGE (ML) INJECTION EVERY 12 HOURS SCHEDULED
Status: DISCONTINUED | OUTPATIENT
Start: 2025-04-02 | End: 2025-04-09 | Stop reason: HOSPADM

## 2025-04-02 RX ORDER — SODIUM CHLORIDE 0.9 % (FLUSH) 0.9 %
5-40 SYRINGE (ML) INJECTION PRN
Status: DISCONTINUED | OUTPATIENT
Start: 2025-04-02 | End: 2025-04-09 | Stop reason: HOSPADM

## 2025-04-02 RX ORDER — AMLODIPINE BESYLATE 5 MG/1
10 TABLET ORAL DAILY
Status: DISCONTINUED | OUTPATIENT
Start: 2025-04-02 | End: 2025-04-04

## 2025-04-02 RX ORDER — TRAZODONE HYDROCHLORIDE 50 MG/1
50 TABLET ORAL NIGHTLY
Status: DISCONTINUED | OUTPATIENT
Start: 2025-04-02 | End: 2025-04-09 | Stop reason: HOSPADM

## 2025-04-02 RX ORDER — CLOPIDOGREL BISULFATE 75 MG/1
75 TABLET ORAL DAILY
Status: DISCONTINUED | OUTPATIENT
Start: 2025-04-02 | End: 2025-04-09 | Stop reason: HOSPADM

## 2025-04-02 RX ADMIN — Medication 16 G: at 00:07

## 2025-04-02 RX ADMIN — HALOPERIDOL LACTATE 2 MG: 5 INJECTION, SOLUTION INTRAMUSCULAR at 12:19

## 2025-04-02 RX ADMIN — WATER 2.5 MG: 1 INJECTION INTRAMUSCULAR; INTRAVENOUS; SUBCUTANEOUS at 05:05

## 2025-04-02 RX ADMIN — TRAZODONE HYDROCHLORIDE 50 MG: 50 TABLET ORAL at 20:51

## 2025-04-02 RX ADMIN — DEXTROSE 125 ML: 10 SOLUTION INTRAVENOUS at 00:17

## 2025-04-02 RX ADMIN — BENZTROPINE MESYLATE 1 MG: 1 TABLET ORAL at 20:51

## 2025-04-02 RX ADMIN — LEVOTHYROXINE SODIUM 175 MCG: 0.05 TABLET ORAL at 05:02

## 2025-04-02 NOTE — ED NOTES
Patient trying to get up to go to the bathroom. With assistance, she was changed and placed in a new bed with a gown and redressed and purewick was placed     (The patient has removed a purewick and several iv lines during this stay. She openly endorses that she is a \"\"

## 2025-04-02 NOTE — ED NOTES
Discussed with provider to medicate to make the patient less anxious and help her to get some rest that she desperately needs. (See medication changes)

## 2025-04-02 NOTE — ED NOTES
Responded to bedside following lab notification of critical blood glucose value of 52. Patient noted to be more lethargic than previous assessments and only provides yes/no to questions. Patient's blood glucose assessed by POC at bedside, value of 55 obtained. DO notified, order placed for glucose tablets as patient remains alert. Patient does not cooperate with administration of juice and glucose tablets. DO notified, orders placed for IV dextrose. Administered dextrose as ordered. EDT to bedside for safety until patient's mentation improves to baseline.

## 2025-04-02 NOTE — ED NOTES
Patient removed IV at this time.  Patient refusing for staff to insert new IV.  Perfectserve sent to Dr. Chavarria.

## 2025-04-02 NOTE — ED NOTES
ED TO INPATIENT SBAR HANDOFF        Verbal report given to Shane on Brooklynn Mathews  being transferred to [facility] for routine progression of patient care       Patient Name: Brooklynn Mathews   Preferred Name: Brooklynn  : 1940  84 y.o.   Family/Caregiver Present: no   Code Status Order: Full Code     C-SSRS: Risk of Suicide: No Risk  Sitter no Safety  Restraints:       Information from the following report(s) Nurse Handoff Report, ED SBAR, and MAR was reviewed with the receiving nurse.    Tamra Fall Assessment:    Presents to emergency department  because of falls (Syncope, seizure, or loss of consciousness): No  Age > 70: Yes  Altered Mental Status, Intoxication with alcohol or substance confusion (Disorientation, impaired judgment, poor safety awaremess, or inability to follow instructions): Yes  Impaired Mobility: Ambulates or transfers with assistive devices or assistance; Unable to ambulate or transer.: Yes  Nursing Judgement: Yes          Situation  Chief Complaint   Patient presents with    Hypoglycemia     EMS visited the home twice today. On the first time, she was \"57 bg\" where they gave her some glucagon. On the second time, they got an IV and gave her Dextrose IV and the Blood glucose went up to a normal range. (EMS indicates that home monitoring and care may need follow up)       Mental Status: disoriented  [facility]  Imaging:   No orders to display     Abnormal labs:   Abnormal Labs Reviewed   CBC WITH AUTO DIFFERENTIAL - Abnormal; Notable for the following components:       Result Value    RBC 3.79 (*)     MPV 8.7 (*)     Neutrophils % 76.3 (*)     All other components within normal limits   COMPREHENSIVE METABOLIC PANEL - Abnormal; Notable for the following components:    Sodium 135 (*)     Anion Gap 1 (*)     Glucose 52 (*)     BUN/Creatinine Ratio 26 (*)     Albumin 2.6 (*)     Globulin 4.8 (*)     Albumin/Globulin Ratio 0.5 (*)     All other components within normal

## 2025-04-02 NOTE — H&P
pulmonary vasculature is within normal limits. The lungs and pleural spaces are clear. The visualized bones and upper abdomen are age-appropriate.     No acute process on portable chest. Electronically signed by JEF JOSHUA     _______________________________________________________________________    TOTAL TIME:  76 Minutes    Signed: Rolo Marx MD    Procedures: see electronic medical records for all procedures/Xrays and details which were not copied into this note but were reviewed prior to creation of Plan.

## 2025-04-02 NOTE — ED NOTES
Patient is becoming increasingly agitated at this time and trying to climb out of the bed.  She is not receptive to redirection.  Perfectserve sent to Dr. Chavarria at this time.

## 2025-04-03 LAB
ALBUMIN SERPL-MCNC: 2.3 G/DL (ref 3.5–5)
ALBUMIN/GLOB SERPL: 0.6 (ref 1.1–2.2)
ALP SERPL-CCNC: 83 U/L (ref 45–117)
ALT SERPL-CCNC: 17 U/L (ref 12–78)
ANION GAP SERPL CALC-SCNC: 4 MMOL/L (ref 2–12)
AST SERPL-CCNC: 22 U/L (ref 15–37)
BASOPHILS # BLD: 0.02 K/UL (ref 0–0.1)
BASOPHILS NFR BLD: 0.5 % (ref 0–1)
BILIRUB SERPL-MCNC: 0.4 MG/DL (ref 0.2–1)
BUN SERPL-MCNC: 18 MG/DL (ref 6–20)
BUN/CREAT SERPL: 23 (ref 12–20)
CALCIUM SERPL-MCNC: 8.4 MG/DL (ref 8.5–10.1)
CHLORIDE SERPL-SCNC: 103 MMOL/L (ref 97–108)
CO2 SERPL-SCNC: 28 MMOL/L (ref 21–32)
CREAT SERPL-MCNC: 0.78 MG/DL (ref 0.55–1.02)
DIFFERENTIAL METHOD BLD: ABNORMAL
EOSINOPHIL # BLD: 0.11 K/UL (ref 0–0.4)
EOSINOPHIL NFR BLD: 3 % (ref 0–7)
ERYTHROCYTE [DISTWIDTH] IN BLOOD BY AUTOMATED COUNT: 14.6 % (ref 11.5–14.5)
GLOBULIN SER CALC-MCNC: 4.1 G/DL (ref 2–4)
GLUCOSE BLD STRIP.AUTO-MCNC: 117 MG/DL (ref 65–117)
GLUCOSE BLD STRIP.AUTO-MCNC: 248 MG/DL (ref 65–117)
GLUCOSE BLD STRIP.AUTO-MCNC: 284 MG/DL (ref 65–117)
GLUCOSE BLD STRIP.AUTO-MCNC: 284 MG/DL (ref 65–117)
GLUCOSE SERPL-MCNC: 307 MG/DL (ref 65–100)
HCT VFR BLD AUTO: 32.7 % (ref 35–47)
HGB BLD-MCNC: 10.8 G/DL (ref 11.5–16)
IMM GRANULOCYTES # BLD AUTO: 0.03 K/UL (ref 0–0.04)
IMM GRANULOCYTES NFR BLD AUTO: 0.8 % (ref 0–0.5)
LYMPHOCYTES # BLD: 1.27 K/UL (ref 0.8–3.5)
LYMPHOCYTES NFR BLD: 34.8 % (ref 12–49)
MCH RBC QN AUTO: 31.2 PG (ref 26–34)
MCHC RBC AUTO-ENTMCNC: 33 G/DL (ref 30–36.5)
MCV RBC AUTO: 94.5 FL (ref 80–99)
MONOCYTES # BLD: 0.44 K/UL (ref 0–1)
MONOCYTES NFR BLD: 12.1 % (ref 5–13)
NEUTS SEG # BLD: 1.78 K/UL (ref 1.8–8)
NEUTS SEG NFR BLD: 48.8 % (ref 32–75)
NRBC # BLD: 0 K/UL (ref 0–0.01)
NRBC BLD-RTO: 0 PER 100 WBC
PLATELET # BLD AUTO: 265 K/UL (ref 150–400)
PMV BLD AUTO: 8.7 FL (ref 8.9–12.9)
POTASSIUM SERPL-SCNC: 3.8 MMOL/L (ref 3.5–5.1)
PROT SERPL-MCNC: 6.4 G/DL (ref 6.4–8.2)
RBC # BLD AUTO: 3.46 M/UL (ref 3.8–5.2)
SERVICE CMNT-IMP: ABNORMAL
SERVICE CMNT-IMP: NORMAL
SODIUM SERPL-SCNC: 135 MMOL/L (ref 136–145)
WBC # BLD AUTO: 3.7 K/UL (ref 3.6–11)

## 2025-04-03 PROCEDURE — 6370000000 HC RX 637 (ALT 250 FOR IP): Performed by: STUDENT IN AN ORGANIZED HEALTH CARE EDUCATION/TRAINING PROGRAM

## 2025-04-03 PROCEDURE — 82962 GLUCOSE BLOOD TEST: CPT

## 2025-04-03 PROCEDURE — 36415 COLL VENOUS BLD VENIPUNCTURE: CPT

## 2025-04-03 PROCEDURE — 1100000003 HC PRIVATE W/ TELEMETRY

## 2025-04-03 PROCEDURE — 83036 HEMOGLOBIN GLYCOSYLATED A1C: CPT

## 2025-04-03 PROCEDURE — 6360000002 HC RX W HCPCS: Performed by: STUDENT IN AN ORGANIZED HEALTH CARE EDUCATION/TRAINING PROGRAM

## 2025-04-03 PROCEDURE — 6370000000 HC RX 637 (ALT 250 FOR IP): Performed by: INTERNAL MEDICINE

## 2025-04-03 PROCEDURE — 85025 COMPLETE CBC W/AUTO DIFF WBC: CPT

## 2025-04-03 PROCEDURE — 80053 COMPREHEN METABOLIC PANEL: CPT

## 2025-04-03 RX ORDER — INSULIN LISPRO 100 [IU]/ML
0-8 INJECTION, SOLUTION INTRAVENOUS; SUBCUTANEOUS
Status: DISCONTINUED | OUTPATIENT
Start: 2025-04-03 | End: 2025-04-03

## 2025-04-03 RX ORDER — INSULIN LISPRO 100 [IU]/ML
0-4 INJECTION, SOLUTION INTRAVENOUS; SUBCUTANEOUS
Status: DISCONTINUED | OUTPATIENT
Start: 2025-04-04 | End: 2025-04-09 | Stop reason: HOSPADM

## 2025-04-03 RX ORDER — INSULIN GLARGINE 100 [IU]/ML
8 INJECTION, SOLUTION SUBCUTANEOUS NIGHTLY
Status: DISCONTINUED | OUTPATIENT
Start: 2025-04-03 | End: 2025-04-06

## 2025-04-03 RX ADMIN — PRAVASTATIN SODIUM 80 MG: 40 TABLET ORAL at 15:35

## 2025-04-03 RX ADMIN — HYDROCHLOROTHIAZIDE 25 MG: 25 TABLET ORAL at 15:35

## 2025-04-03 RX ADMIN — INSULIN LISPRO 2 UNITS: 100 INJECTION, SOLUTION INTRAVENOUS; SUBCUTANEOUS at 17:53

## 2025-04-03 RX ADMIN — LOSARTAN POTASSIUM 100 MG: 100 TABLET, FILM COATED ORAL at 15:35

## 2025-04-03 RX ADMIN — BRIMONIDINE TARTRATE 1 DROP: 2 SOLUTION OPHTHALMIC at 15:36

## 2025-04-03 RX ADMIN — INSULIN GLARGINE 8 UNITS: 100 INJECTION, SOLUTION SUBCUTANEOUS at 21:33

## 2025-04-03 RX ADMIN — ENOXAPARIN SODIUM 40 MG: 100 INJECTION SUBCUTANEOUS at 15:36

## 2025-04-03 RX ADMIN — BRIMONIDINE TARTRATE 1 DROP: 2 SOLUTION OPHTHALMIC at 21:29

## 2025-04-03 RX ADMIN — METOPROLOL SUCCINATE 25 MG: 25 TABLET, EXTENDED RELEASE ORAL at 15:36

## 2025-04-03 RX ADMIN — CLOPIDOGREL BISULFATE 75 MG: 75 TABLET, FILM COATED ORAL at 15:35

## 2025-04-03 RX ADMIN — AMLODIPINE BESYLATE 10 MG: 5 TABLET ORAL at 15:35

## 2025-04-03 RX ADMIN — BENZTROPINE MESYLATE 1 MG: 1 TABLET ORAL at 15:35

## 2025-04-04 LAB
ALBUMIN SERPL-MCNC: 2.5 G/DL (ref 3.5–5)
ALBUMIN/GLOB SERPL: 0.6 (ref 1.1–2.2)
ALP SERPL-CCNC: 85 U/L (ref 45–117)
ALT SERPL-CCNC: 16 U/L (ref 12–78)
ANION GAP SERPL CALC-SCNC: 4 MMOL/L (ref 2–12)
AST SERPL-CCNC: 17 U/L (ref 15–37)
BASOPHILS # BLD: 0.03 K/UL (ref 0–0.1)
BASOPHILS NFR BLD: 0.6 % (ref 0–1)
BILIRUB SERPL-MCNC: 0.5 MG/DL (ref 0.2–1)
BUN SERPL-MCNC: 21 MG/DL (ref 6–20)
BUN/CREAT SERPL: 25 (ref 12–20)
CALCIUM SERPL-MCNC: 8.8 MG/DL (ref 8.5–10.1)
CHLORIDE SERPL-SCNC: 105 MMOL/L (ref 97–108)
CO2 SERPL-SCNC: 30 MMOL/L (ref 21–32)
CREAT SERPL-MCNC: 0.85 MG/DL (ref 0.55–1.02)
DIFFERENTIAL METHOD BLD: ABNORMAL
EOSINOPHIL # BLD: 0.11 K/UL (ref 0–0.4)
EOSINOPHIL NFR BLD: 2.3 % (ref 0–7)
ERYTHROCYTE [DISTWIDTH] IN BLOOD BY AUTOMATED COUNT: 14.5 % (ref 11.5–14.5)
EST. AVERAGE GLUCOSE BLD GHB EST-MCNC: 206 MG/DL
GLOBULIN SER CALC-MCNC: 4.4 G/DL (ref 2–4)
GLUCOSE BLD STRIP.AUTO-MCNC: 102 MG/DL (ref 65–117)
GLUCOSE BLD STRIP.AUTO-MCNC: 198 MG/DL (ref 65–117)
GLUCOSE BLD STRIP.AUTO-MCNC: 257 MG/DL (ref 65–117)
GLUCOSE BLD STRIP.AUTO-MCNC: 345 MG/DL (ref 65–117)
GLUCOSE BLD STRIP.AUTO-MCNC: 359 MG/DL (ref 65–117)
GLUCOSE BLD STRIP.AUTO-MCNC: 91 MG/DL (ref 65–117)
GLUCOSE SERPL-MCNC: 93 MG/DL (ref 65–100)
HBA1C MFR BLD: 8.8 % (ref 4–5.6)
HCT VFR BLD AUTO: 34.3 % (ref 35–47)
HGB BLD-MCNC: 11.3 G/DL (ref 11.5–16)
IMM GRANULOCYTES # BLD AUTO: 0.01 K/UL (ref 0–0.04)
IMM GRANULOCYTES NFR BLD AUTO: 0.2 % (ref 0–0.5)
LYMPHOCYTES # BLD: 1.18 K/UL (ref 0.8–3.5)
LYMPHOCYTES NFR BLD: 24.7 % (ref 12–49)
MAGNESIUM SERPL-MCNC: 2 MG/DL (ref 1.6–2.4)
MCH RBC QN AUTO: 31.1 PG (ref 26–34)
MCHC RBC AUTO-ENTMCNC: 32.9 G/DL (ref 30–36.5)
MCV RBC AUTO: 94.5 FL (ref 80–99)
MONOCYTES # BLD: 0.62 K/UL (ref 0–1)
MONOCYTES NFR BLD: 13 % (ref 5–13)
NEUTS SEG # BLD: 2.83 K/UL (ref 1.8–8)
NEUTS SEG NFR BLD: 59.2 % (ref 32–75)
NRBC # BLD: 0 K/UL (ref 0–0.01)
NRBC BLD-RTO: 0 PER 100 WBC
PLATELET # BLD AUTO: 300 K/UL (ref 150–400)
PMV BLD AUTO: 8.7 FL (ref 8.9–12.9)
POTASSIUM SERPL-SCNC: 3.2 MMOL/L (ref 3.5–5.1)
PROT SERPL-MCNC: 6.9 G/DL (ref 6.4–8.2)
RBC # BLD AUTO: 3.63 M/UL (ref 3.8–5.2)
SERVICE CMNT-IMP: ABNORMAL
SERVICE CMNT-IMP: NORMAL
SERVICE CMNT-IMP: NORMAL
SODIUM SERPL-SCNC: 139 MMOL/L (ref 136–145)
WBC # BLD AUTO: 4.8 K/UL (ref 3.6–11)

## 2025-04-04 PROCEDURE — 82962 GLUCOSE BLOOD TEST: CPT

## 2025-04-04 PROCEDURE — 97530 THERAPEUTIC ACTIVITIES: CPT

## 2025-04-04 PROCEDURE — 6360000002 HC RX W HCPCS: Performed by: STUDENT IN AN ORGANIZED HEALTH CARE EDUCATION/TRAINING PROGRAM

## 2025-04-04 PROCEDURE — 6370000000 HC RX 637 (ALT 250 FOR IP): Performed by: INTERNAL MEDICINE

## 2025-04-04 PROCEDURE — 80053 COMPREHEN METABOLIC PANEL: CPT

## 2025-04-04 PROCEDURE — 85025 COMPLETE CBC W/AUTO DIFF WBC: CPT

## 2025-04-04 PROCEDURE — 1100000003 HC PRIVATE W/ TELEMETRY

## 2025-04-04 PROCEDURE — 36415 COLL VENOUS BLD VENIPUNCTURE: CPT

## 2025-04-04 PROCEDURE — 97162 PT EVAL MOD COMPLEX 30 MIN: CPT

## 2025-04-04 PROCEDURE — 83735 ASSAY OF MAGNESIUM: CPT

## 2025-04-04 PROCEDURE — 6370000000 HC RX 637 (ALT 250 FOR IP): Performed by: STUDENT IN AN ORGANIZED HEALTH CARE EDUCATION/TRAINING PROGRAM

## 2025-04-04 PROCEDURE — 97116 GAIT TRAINING THERAPY: CPT

## 2025-04-04 RX ORDER — AMLODIPINE BESYLATE 5 MG/1
5 TABLET ORAL DAILY
Status: DISCONTINUED | OUTPATIENT
Start: 2025-04-04 | End: 2025-04-06

## 2025-04-04 RX ORDER — INSULIN LISPRO 100 [IU]/ML
3 INJECTION, SOLUTION INTRAVENOUS; SUBCUTANEOUS
Status: DISCONTINUED | OUTPATIENT
Start: 2025-04-05 | End: 2025-04-05

## 2025-04-04 RX ORDER — LOSARTAN POTASSIUM 25 MG/1
50 TABLET ORAL DAILY
Status: DISCONTINUED | OUTPATIENT
Start: 2025-04-05 | End: 2025-04-05

## 2025-04-04 RX ADMIN — TRAZODONE HYDROCHLORIDE 50 MG: 50 TABLET ORAL at 21:59

## 2025-04-04 RX ADMIN — BRIMONIDINE TARTRATE 1 DROP: 2 SOLUTION OPHTHALMIC at 08:51

## 2025-04-04 RX ADMIN — BRIMONIDINE TARTRATE 1 DROP: 2 SOLUTION OPHTHALMIC at 22:00

## 2025-04-04 RX ADMIN — HYDROCHLOROTHIAZIDE 25 MG: 25 TABLET ORAL at 08:51

## 2025-04-04 RX ADMIN — CLOPIDOGREL BISULFATE 75 MG: 75 TABLET, FILM COATED ORAL at 08:49

## 2025-04-04 RX ADMIN — LOSARTAN POTASSIUM 100 MG: 100 TABLET, FILM COATED ORAL at 08:49

## 2025-04-04 RX ADMIN — INSULIN GLARGINE 8 UNITS: 100 INJECTION, SOLUTION SUBCUTANEOUS at 21:58

## 2025-04-04 RX ADMIN — METOPROLOL SUCCINATE 25 MG: 25 TABLET, EXTENDED RELEASE ORAL at 08:50

## 2025-04-04 RX ADMIN — PRAVASTATIN SODIUM 80 MG: 40 TABLET ORAL at 08:50

## 2025-04-04 RX ADMIN — INSULIN LISPRO 3 UNITS: 100 INJECTION, SOLUTION INTRAVENOUS; SUBCUTANEOUS at 16:55

## 2025-04-04 RX ADMIN — INSULIN LISPRO 1 UNITS: 100 INJECTION, SOLUTION INTRAVENOUS; SUBCUTANEOUS at 21:59

## 2025-04-04 RX ADMIN — POTASSIUM BICARBONATE 40 MEQ: 782 TABLET, EFFERVESCENT ORAL at 21:59

## 2025-04-04 RX ADMIN — BENZTROPINE MESYLATE 1 MG: 1 TABLET ORAL at 22:00

## 2025-04-04 RX ADMIN — ENOXAPARIN SODIUM 40 MG: 100 INJECTION SUBCUTANEOUS at 08:51

## 2025-04-04 RX ADMIN — LEVOTHYROXINE SODIUM 175 MCG: 0.05 TABLET ORAL at 08:50

## 2025-04-04 RX ADMIN — BENZTROPINE MESYLATE 1 MG: 1 TABLET ORAL at 08:50

## 2025-04-04 RX ADMIN — INSULIN LISPRO 2 UNITS: 100 INJECTION, SOLUTION INTRAVENOUS; SUBCUTANEOUS at 12:03

## 2025-04-04 RX ADMIN — AMLODIPINE BESYLATE 5 MG: 5 TABLET ORAL at 08:50

## 2025-04-05 LAB
ALBUMIN SERPL-MCNC: 2.4 G/DL (ref 3.5–5)
ALBUMIN/GLOB SERPL: 0.5 (ref 1.1–2.2)
ALP SERPL-CCNC: 83 U/L (ref 45–117)
ALT SERPL-CCNC: 15 U/L (ref 12–78)
ANION GAP SERPL CALC-SCNC: 4 MMOL/L (ref 2–12)
AST SERPL-CCNC: 15 U/L (ref 15–37)
BASOPHILS # BLD: 0.01 K/UL (ref 0–0.1)
BASOPHILS NFR BLD: 0.2 % (ref 0–1)
BILIRUB SERPL-MCNC: 0.5 MG/DL (ref 0.2–1)
BUN SERPL-MCNC: 26 MG/DL (ref 6–20)
BUN/CREAT SERPL: 30 (ref 12–20)
CALCIUM SERPL-MCNC: 8.4 MG/DL (ref 8.5–10.1)
CHLORIDE SERPL-SCNC: 107 MMOL/L (ref 97–108)
CO2 SERPL-SCNC: 30 MMOL/L (ref 21–32)
CREAT SERPL-MCNC: 0.86 MG/DL (ref 0.55–1.02)
DIFFERENTIAL METHOD BLD: ABNORMAL
EOSINOPHIL # BLD: 0.15 K/UL (ref 0–0.4)
EOSINOPHIL NFR BLD: 2.7 % (ref 0–7)
ERYTHROCYTE [DISTWIDTH] IN BLOOD BY AUTOMATED COUNT: 14.5 % (ref 11.5–14.5)
GLOBULIN SER CALC-MCNC: 4.4 G/DL (ref 2–4)
GLUCOSE BLD STRIP.AUTO-MCNC: 240 MG/DL (ref 65–117)
GLUCOSE BLD STRIP.AUTO-MCNC: 249 MG/DL (ref 65–117)
GLUCOSE BLD STRIP.AUTO-MCNC: 330 MG/DL (ref 65–117)
GLUCOSE BLD STRIP.AUTO-MCNC: 47 MG/DL (ref 65–117)
GLUCOSE BLD STRIP.AUTO-MCNC: 52 MG/DL (ref 65–117)
GLUCOSE BLD STRIP.AUTO-MCNC: 75 MG/DL (ref 65–117)
GLUCOSE BLD STRIP.AUTO-MCNC: 86 MG/DL (ref 65–117)
GLUCOSE SERPL-MCNC: 51 MG/DL (ref 65–100)
HCT VFR BLD AUTO: 32.4 % (ref 35–47)
HGB BLD-MCNC: 10.8 G/DL (ref 11.5–16)
IMM GRANULOCYTES # BLD AUTO: 0.02 K/UL (ref 0–0.04)
IMM GRANULOCYTES NFR BLD AUTO: 0.4 % (ref 0–0.5)
LYMPHOCYTES # BLD: 1.34 K/UL (ref 0.8–3.5)
LYMPHOCYTES NFR BLD: 24.5 % (ref 12–49)
MCH RBC QN AUTO: 31.5 PG (ref 26–34)
MCHC RBC AUTO-ENTMCNC: 33.3 G/DL (ref 30–36.5)
MCV RBC AUTO: 94.5 FL (ref 80–99)
MONOCYTES # BLD: 0.58 K/UL (ref 0–1)
MONOCYTES NFR BLD: 10.6 % (ref 5–13)
NEUTS SEG # BLD: 3.38 K/UL (ref 1.8–8)
NEUTS SEG NFR BLD: 61.6 % (ref 32–75)
NRBC # BLD: 0 K/UL (ref 0–0.01)
NRBC BLD-RTO: 0 PER 100 WBC
PLATELET # BLD AUTO: 271 K/UL (ref 150–400)
PMV BLD AUTO: 8.9 FL (ref 8.9–12.9)
POTASSIUM SERPL-SCNC: 3.4 MMOL/L (ref 3.5–5.1)
PROT SERPL-MCNC: 6.8 G/DL (ref 6.4–8.2)
RBC # BLD AUTO: 3.43 M/UL (ref 3.8–5.2)
SERVICE CMNT-IMP: ABNORMAL
SERVICE CMNT-IMP: NORMAL
SERVICE CMNT-IMP: NORMAL
SODIUM SERPL-SCNC: 141 MMOL/L (ref 136–145)
WBC # BLD AUTO: 5.5 K/UL (ref 3.6–11)

## 2025-04-05 PROCEDURE — 6370000000 HC RX 637 (ALT 250 FOR IP): Performed by: STUDENT IN AN ORGANIZED HEALTH CARE EDUCATION/TRAINING PROGRAM

## 2025-04-05 PROCEDURE — 36415 COLL VENOUS BLD VENIPUNCTURE: CPT

## 2025-04-05 PROCEDURE — 85025 COMPLETE CBC W/AUTO DIFF WBC: CPT

## 2025-04-05 PROCEDURE — 1100000003 HC PRIVATE W/ TELEMETRY

## 2025-04-05 PROCEDURE — 6370000000 HC RX 637 (ALT 250 FOR IP): Performed by: INTERNAL MEDICINE

## 2025-04-05 PROCEDURE — 6360000002 HC RX W HCPCS: Performed by: STUDENT IN AN ORGANIZED HEALTH CARE EDUCATION/TRAINING PROGRAM

## 2025-04-05 PROCEDURE — 80053 COMPREHEN METABOLIC PANEL: CPT

## 2025-04-05 PROCEDURE — 82962 GLUCOSE BLOOD TEST: CPT

## 2025-04-05 RX ORDER — INSULIN LISPRO 100 [IU]/ML
2 INJECTION, SOLUTION INTRAVENOUS; SUBCUTANEOUS
Status: DISCONTINUED | OUTPATIENT
Start: 2025-04-06 | End: 2025-04-06

## 2025-04-05 RX ORDER — POTASSIUM CHLORIDE 1500 MG/1
40 TABLET, EXTENDED RELEASE ORAL ONCE
Status: COMPLETED | OUTPATIENT
Start: 2025-04-05 | End: 2025-04-05

## 2025-04-05 RX ADMIN — PRAVASTATIN SODIUM 80 MG: 40 TABLET ORAL at 11:19

## 2025-04-05 RX ADMIN — LOSARTAN POTASSIUM 50 MG: 25 TABLET, FILM COATED ORAL at 11:19

## 2025-04-05 RX ADMIN — POTASSIUM CHLORIDE 40 MEQ: 1500 TABLET, EXTENDED RELEASE ORAL at 11:19

## 2025-04-05 RX ADMIN — INSULIN LISPRO 3 UNITS: 100 INJECTION, SOLUTION INTRAVENOUS; SUBCUTANEOUS at 21:01

## 2025-04-05 RX ADMIN — BENZTROPINE MESYLATE 1 MG: 1 TABLET ORAL at 11:18

## 2025-04-05 RX ADMIN — BRIMONIDINE TARTRATE 1 DROP: 2 SOLUTION OPHTHALMIC at 11:26

## 2025-04-05 RX ADMIN — CLOPIDOGREL BISULFATE 75 MG: 75 TABLET, FILM COATED ORAL at 11:19

## 2025-04-05 RX ADMIN — LEVOTHYROXINE SODIUM 175 MCG: 0.05 TABLET ORAL at 05:55

## 2025-04-05 RX ADMIN — INSULIN GLARGINE 8 UNITS: 100 INJECTION, SOLUTION SUBCUTANEOUS at 21:02

## 2025-04-05 RX ADMIN — ENOXAPARIN SODIUM 40 MG: 100 INJECTION SUBCUTANEOUS at 11:26

## 2025-04-05 RX ADMIN — BRIMONIDINE TARTRATE 1 DROP: 2 SOLUTION OPHTHALMIC at 21:52

## 2025-04-05 RX ADMIN — TRAZODONE HYDROCHLORIDE 50 MG: 50 TABLET ORAL at 20:38

## 2025-04-05 RX ADMIN — AMLODIPINE BESYLATE 5 MG: 5 TABLET ORAL at 11:19

## 2025-04-05 RX ADMIN — METOPROLOL SUCCINATE 25 MG: 25 TABLET, EXTENDED RELEASE ORAL at 11:19

## 2025-04-05 RX ADMIN — BENZTROPINE MESYLATE 1 MG: 1 TABLET ORAL at 20:38

## 2025-04-05 ASSESSMENT — PAIN SCALES - GENERAL: PAINLEVEL_OUTOF10: 0

## 2025-04-06 LAB
GLUCOSE BLD STRIP.AUTO-MCNC: 159 MG/DL (ref 65–117)
GLUCOSE BLD STRIP.AUTO-MCNC: 210 MG/DL (ref 65–117)
GLUCOSE BLD STRIP.AUTO-MCNC: 298 MG/DL (ref 65–117)
GLUCOSE BLD STRIP.AUTO-MCNC: 351 MG/DL (ref 65–117)
GLUCOSE BLD STRIP.AUTO-MCNC: 431 MG/DL (ref 65–117)
SERVICE CMNT-IMP: ABNORMAL

## 2025-04-06 PROCEDURE — 6360000002 HC RX W HCPCS: Performed by: STUDENT IN AN ORGANIZED HEALTH CARE EDUCATION/TRAINING PROGRAM

## 2025-04-06 PROCEDURE — 6370000000 HC RX 637 (ALT 250 FOR IP): Performed by: INTERNAL MEDICINE

## 2025-04-06 PROCEDURE — 6370000000 HC RX 637 (ALT 250 FOR IP): Performed by: STUDENT IN AN ORGANIZED HEALTH CARE EDUCATION/TRAINING PROGRAM

## 2025-04-06 PROCEDURE — 82962 GLUCOSE BLOOD TEST: CPT

## 2025-04-06 PROCEDURE — 1100000003 HC PRIVATE W/ TELEMETRY

## 2025-04-06 RX ORDER — AMLODIPINE BESYLATE 5 MG/1
2.5 TABLET ORAL DAILY
Status: DISCONTINUED | OUTPATIENT
Start: 2025-04-07 | End: 2025-04-08

## 2025-04-06 RX ORDER — INSULIN LISPRO 100 [IU]/ML
3 INJECTION, SOLUTION INTRAVENOUS; SUBCUTANEOUS
Status: DISCONTINUED | OUTPATIENT
Start: 2025-04-06 | End: 2025-04-07

## 2025-04-06 RX ORDER — INSULIN GLARGINE 100 [IU]/ML
10 INJECTION, SOLUTION SUBCUTANEOUS NIGHTLY
Status: DISCONTINUED | OUTPATIENT
Start: 2025-04-06 | End: 2025-04-07

## 2025-04-06 RX ADMIN — METOPROLOL SUCCINATE 25 MG: 25 TABLET, EXTENDED RELEASE ORAL at 09:53

## 2025-04-06 RX ADMIN — BRIMONIDINE TARTRATE 1 DROP: 2 SOLUTION OPHTHALMIC at 20:57

## 2025-04-06 RX ADMIN — ENOXAPARIN SODIUM 40 MG: 100 INJECTION SUBCUTANEOUS at 09:58

## 2025-04-06 RX ADMIN — INSULIN GLARGINE 10 UNITS: 100 INJECTION, SOLUTION SUBCUTANEOUS at 20:58

## 2025-04-06 RX ADMIN — INSULIN LISPRO 2 UNITS: 100 INJECTION, SOLUTION INTRAVENOUS; SUBCUTANEOUS at 09:57

## 2025-04-06 RX ADMIN — INSULIN LISPRO 4 UNITS: 100 INJECTION, SOLUTION INTRAVENOUS; SUBCUTANEOUS at 10:40

## 2025-04-06 RX ADMIN — CLOPIDOGREL BISULFATE 75 MG: 75 TABLET, FILM COATED ORAL at 09:52

## 2025-04-06 RX ADMIN — INSULIN LISPRO 4 UNITS: 100 INJECTION, SOLUTION INTRAVENOUS; SUBCUTANEOUS at 16:56

## 2025-04-06 RX ADMIN — BRIMONIDINE TARTRATE 1 DROP: 2 SOLUTION OPHTHALMIC at 10:04

## 2025-04-06 RX ADMIN — LEVOTHYROXINE SODIUM 175 MCG: 0.05 TABLET ORAL at 06:14

## 2025-04-06 RX ADMIN — BENZTROPINE MESYLATE 1 MG: 1 TABLET ORAL at 09:53

## 2025-04-06 RX ADMIN — BENZTROPINE MESYLATE 1 MG: 1 TABLET ORAL at 20:58

## 2025-04-06 RX ADMIN — TRAZODONE HYDROCHLORIDE 50 MG: 50 TABLET ORAL at 20:58

## 2025-04-06 RX ADMIN — AMLODIPINE BESYLATE 5 MG: 5 TABLET ORAL at 09:53

## 2025-04-06 RX ADMIN — INSULIN LISPRO 3 UNITS: 100 INJECTION, SOLUTION INTRAVENOUS; SUBCUTANEOUS at 16:55

## 2025-04-06 RX ADMIN — PRAVASTATIN SODIUM 80 MG: 40 TABLET ORAL at 09:52

## 2025-04-06 RX ADMIN — INSULIN LISPRO 1 UNITS: 100 INJECTION, SOLUTION INTRAVENOUS; SUBCUTANEOUS at 20:57

## 2025-04-06 ASSESSMENT — PAIN SCALES - GENERAL: PAINLEVEL_OUTOF10: 0

## 2025-04-07 LAB
ALBUMIN SERPL-MCNC: 2.5 G/DL (ref 3.5–5)
ALBUMIN/GLOB SERPL: 0.6 (ref 1.1–2.2)
ALP SERPL-CCNC: 93 U/L (ref 45–117)
ALT SERPL-CCNC: 16 U/L (ref 12–78)
ANION GAP SERPL CALC-SCNC: 7 MMOL/L (ref 2–12)
AST SERPL-CCNC: 14 U/L (ref 15–37)
BASOPHILS # BLD: 0.02 K/UL (ref 0–0.1)
BASOPHILS NFR BLD: 0.4 % (ref 0–1)
BILIRUB SERPL-MCNC: 1.1 MG/DL (ref 0.2–1)
BUN SERPL-MCNC: 20 MG/DL (ref 6–20)
BUN/CREAT SERPL: 30 (ref 12–20)
CALCIUM SERPL-MCNC: 8.4 MG/DL (ref 8.5–10.1)
CHLORIDE SERPL-SCNC: 105 MMOL/L (ref 97–108)
CO2 SERPL-SCNC: 25 MMOL/L (ref 21–32)
CREAT SERPL-MCNC: 0.66 MG/DL (ref 0.55–1.02)
DIFFERENTIAL METHOD BLD: ABNORMAL
EOSINOPHIL # BLD: 0.14 K/UL (ref 0–0.4)
EOSINOPHIL NFR BLD: 3.1 % (ref 0–7)
ERYTHROCYTE [DISTWIDTH] IN BLOOD BY AUTOMATED COUNT: 14.4 % (ref 11.5–14.5)
GLOBULIN SER CALC-MCNC: 4.3 G/DL (ref 2–4)
GLUCOSE BLD STRIP.AUTO-MCNC: 136 MG/DL (ref 65–117)
GLUCOSE BLD STRIP.AUTO-MCNC: 177 MG/DL (ref 65–117)
GLUCOSE BLD STRIP.AUTO-MCNC: 180 MG/DL (ref 65–117)
GLUCOSE BLD STRIP.AUTO-MCNC: 283 MG/DL (ref 65–117)
GLUCOSE BLD STRIP.AUTO-MCNC: 354 MG/DL (ref 65–117)
GLUCOSE BLD STRIP.AUTO-MCNC: 416 MG/DL (ref 65–117)
GLUCOSE BLD STRIP.AUTO-MCNC: 470 MG/DL (ref 65–117)
GLUCOSE SERPL-MCNC: 180 MG/DL (ref 65–100)
HCT VFR BLD AUTO: 32.9 % (ref 35–47)
HGB BLD-MCNC: 10.7 G/DL (ref 11.5–16)
IMM GRANULOCYTES # BLD AUTO: 0.01 K/UL (ref 0–0.04)
IMM GRANULOCYTES NFR BLD AUTO: 0.2 % (ref 0–0.5)
LYMPHOCYTES # BLD: 0.92 K/UL (ref 0.8–3.5)
LYMPHOCYTES NFR BLD: 20 % (ref 12–49)
MCH RBC QN AUTO: 31 PG (ref 26–34)
MCHC RBC AUTO-ENTMCNC: 32.5 G/DL (ref 30–36.5)
MCV RBC AUTO: 95.4 FL (ref 80–99)
MONOCYTES # BLD: 0.52 K/UL (ref 0–1)
MONOCYTES NFR BLD: 11.3 % (ref 5–13)
NEUTS SEG # BLD: 2.98 K/UL (ref 1.8–8)
NEUTS SEG NFR BLD: 65 % (ref 32–75)
NRBC # BLD: 0 K/UL (ref 0–0.01)
NRBC BLD-RTO: 0 PER 100 WBC
PLATELET # BLD AUTO: 266 K/UL (ref 150–400)
PMV BLD AUTO: 9.2 FL (ref 8.9–12.9)
POTASSIUM SERPL-SCNC: 3.9 MMOL/L (ref 3.5–5.1)
PROT SERPL-MCNC: 6.8 G/DL (ref 6.4–8.2)
RBC # BLD AUTO: 3.45 M/UL (ref 3.8–5.2)
SERVICE CMNT-IMP: ABNORMAL
SODIUM SERPL-SCNC: 137 MMOL/L (ref 136–145)
WBC # BLD AUTO: 4.6 K/UL (ref 3.6–11)

## 2025-04-07 PROCEDURE — 6370000000 HC RX 637 (ALT 250 FOR IP): Performed by: INTERNAL MEDICINE

## 2025-04-07 PROCEDURE — 99221 1ST HOSP IP/OBS SF/LOW 40: CPT

## 2025-04-07 PROCEDURE — 85025 COMPLETE CBC W/AUTO DIFF WBC: CPT

## 2025-04-07 PROCEDURE — 80053 COMPREHEN METABOLIC PANEL: CPT

## 2025-04-07 PROCEDURE — 82962 GLUCOSE BLOOD TEST: CPT

## 2025-04-07 PROCEDURE — 6360000002 HC RX W HCPCS: Performed by: STUDENT IN AN ORGANIZED HEALTH CARE EDUCATION/TRAINING PROGRAM

## 2025-04-07 PROCEDURE — 36415 COLL VENOUS BLD VENIPUNCTURE: CPT

## 2025-04-07 PROCEDURE — 6370000000 HC RX 637 (ALT 250 FOR IP): Performed by: STUDENT IN AN ORGANIZED HEALTH CARE EDUCATION/TRAINING PROGRAM

## 2025-04-07 PROCEDURE — 1100000003 HC PRIVATE W/ TELEMETRY

## 2025-04-07 PROCEDURE — 6370000000 HC RX 637 (ALT 250 FOR IP)

## 2025-04-07 RX ORDER — INSULIN LISPRO 100 [IU]/ML
5 INJECTION, SOLUTION INTRAVENOUS; SUBCUTANEOUS
Status: DISCONTINUED | OUTPATIENT
Start: 2025-04-07 | End: 2025-04-09

## 2025-04-07 RX ORDER — INSULIN GLARGINE 100 [IU]/ML
13 INJECTION, SOLUTION SUBCUTANEOUS NIGHTLY
Status: DISCONTINUED | OUTPATIENT
Start: 2025-04-07 | End: 2025-04-09 | Stop reason: HOSPADM

## 2025-04-07 RX ADMIN — PRAVASTATIN SODIUM 80 MG: 40 TABLET ORAL at 09:04

## 2025-04-07 RX ADMIN — CLOPIDOGREL BISULFATE 75 MG: 75 TABLET, FILM COATED ORAL at 09:04

## 2025-04-07 RX ADMIN — TRAZODONE HYDROCHLORIDE 50 MG: 50 TABLET ORAL at 21:34

## 2025-04-07 RX ADMIN — BENZTROPINE MESYLATE 1 MG: 1 TABLET ORAL at 09:04

## 2025-04-07 RX ADMIN — INSULIN LISPRO 3 UNITS: 100 INJECTION, SOLUTION INTRAVENOUS; SUBCUTANEOUS at 11:41

## 2025-04-07 RX ADMIN — INSULIN LISPRO 5 UNITS: 100 INJECTION, SOLUTION INTRAVENOUS; SUBCUTANEOUS at 16:43

## 2025-04-07 RX ADMIN — INSULIN GLARGINE 13 UNITS: 100 INJECTION, SOLUTION SUBCUTANEOUS at 21:35

## 2025-04-07 RX ADMIN — AMLODIPINE BESYLATE 2.5 MG: 5 TABLET ORAL at 09:04

## 2025-04-07 RX ADMIN — INSULIN LISPRO 2 UNITS: 100 INJECTION, SOLUTION INTRAVENOUS; SUBCUTANEOUS at 16:43

## 2025-04-07 RX ADMIN — ENOXAPARIN SODIUM 40 MG: 100 INJECTION SUBCUTANEOUS at 09:05

## 2025-04-07 RX ADMIN — BRIMONIDINE TARTRATE 1 DROP: 2 SOLUTION OPHTHALMIC at 09:06

## 2025-04-07 RX ADMIN — INSULIN LISPRO 4 UNITS: 100 INJECTION, SOLUTION INTRAVENOUS; SUBCUTANEOUS at 11:41

## 2025-04-07 RX ADMIN — BRIMONIDINE TARTRATE 1 DROP: 2 SOLUTION OPHTHALMIC at 21:44

## 2025-04-07 RX ADMIN — BENZTROPINE MESYLATE 1 MG: 1 TABLET ORAL at 21:34

## 2025-04-07 RX ADMIN — METOPROLOL SUCCINATE 25 MG: 25 TABLET, EXTENDED RELEASE ORAL at 09:04

## 2025-04-07 RX ADMIN — INSULIN LISPRO 3 UNITS: 100 INJECTION, SOLUTION INTRAVENOUS; SUBCUTANEOUS at 09:05

## 2025-04-07 NOTE — DIABETES MGMT
Lab Results   Component Value Date    TSH 0.81 02/03/2025         Factors impacting BG management  Factor Dose Comments   Nutrition:  Standard meals   60 grams/meal    Pain PRN caetmainophen    Kidney function Normal    Liver function Normal          Assessment and Nursing Intervention   Nursing Diagnosis Risk for unstable blood glucose pattern   Nursing Intervention Domain 5250 Decision-making Support   Nursing Interventions Examined current inpatient diabetes/blood glucose control   Explored factors facilitating and impeding inpatient management  Explored corrective strategies with patient and responsible inpatient provider   Informed patient of rational for insulin strategy while hospitalized     Nursing Diagnosis 25629 Ineffective Health Management   Nursing Intervention Domain 5250 Decision-making Support   Nursing Interventions Identified diabetes self-management practices impeding diabetes control  Discussed diabetes survival skills related to  Healthy Plate eating plan; given handouts  Role of physical activity in improving insulin sensitivity and action  Procedure for blood glucose monitoring & options for low-cost products  Medications plan at discharge     Billing Code(s)   [x] 28495 IP initial hospital care - 40 minutes   [] 47167 IP initial hospital care -55 minutes  [] 79631 IP initial hospital care -75 minutes  []        96781 IP subsequent hospital care - 50 minutes   [] 47237 IP subsequent hospital care - 35 minutes   [] 65071 IP subsequent hospital care - 25 minutes       Before making these care recommendations, I personally reviewed the hospitalization record, including notes, laboratory & diagnostic data and current medications, and examined the patient at the bedside.  Total minutes: 40 minutes    BRAULIO Leal - CNS   Diabetes Clinical Nurse Specialist   [facility]  Access via [employer]

## 2025-04-08 LAB
GLUCOSE BLD STRIP.AUTO-MCNC: 106 MG/DL (ref 65–117)
GLUCOSE BLD STRIP.AUTO-MCNC: 126 MG/DL (ref 65–117)
GLUCOSE BLD STRIP.AUTO-MCNC: 148 MG/DL (ref 65–117)
GLUCOSE BLD STRIP.AUTO-MCNC: 192 MG/DL (ref 65–117)
GLUCOSE BLD STRIP.AUTO-MCNC: 201 MG/DL (ref 65–117)
SERVICE CMNT-IMP: ABNORMAL
SERVICE CMNT-IMP: NORMAL

## 2025-04-08 PROCEDURE — 1100000003 HC PRIVATE W/ TELEMETRY

## 2025-04-08 PROCEDURE — 6370000000 HC RX 637 (ALT 250 FOR IP): Performed by: STUDENT IN AN ORGANIZED HEALTH CARE EDUCATION/TRAINING PROGRAM

## 2025-04-08 PROCEDURE — 6360000002 HC RX W HCPCS: Performed by: STUDENT IN AN ORGANIZED HEALTH CARE EDUCATION/TRAINING PROGRAM

## 2025-04-08 PROCEDURE — 82962 GLUCOSE BLOOD TEST: CPT

## 2025-04-08 PROCEDURE — 6370000000 HC RX 637 (ALT 250 FOR IP)

## 2025-04-08 PROCEDURE — 6370000000 HC RX 637 (ALT 250 FOR IP): Performed by: INTERNAL MEDICINE

## 2025-04-08 RX ADMIN — TRAZODONE HYDROCHLORIDE 50 MG: 50 TABLET ORAL at 21:58

## 2025-04-08 RX ADMIN — PRAVASTATIN SODIUM 80 MG: 40 TABLET ORAL at 09:02

## 2025-04-08 RX ADMIN — BRIMONIDINE TARTRATE 1 DROP: 2 SOLUTION OPHTHALMIC at 09:07

## 2025-04-08 RX ADMIN — INSULIN LISPRO 1 UNITS: 100 INJECTION, SOLUTION INTRAVENOUS; SUBCUTANEOUS at 21:59

## 2025-04-08 RX ADMIN — BRIMONIDINE TARTRATE 1 DROP: 2 SOLUTION OPHTHALMIC at 22:05

## 2025-04-08 RX ADMIN — ENOXAPARIN SODIUM 40 MG: 100 INJECTION SUBCUTANEOUS at 09:02

## 2025-04-08 RX ADMIN — CLOPIDOGREL BISULFATE 75 MG: 75 TABLET, FILM COATED ORAL at 09:02

## 2025-04-08 RX ADMIN — INSULIN LISPRO 5 UNITS: 100 INJECTION, SOLUTION INTRAVENOUS; SUBCUTANEOUS at 09:02

## 2025-04-08 RX ADMIN — INSULIN LISPRO 5 UNITS: 100 INJECTION, SOLUTION INTRAVENOUS; SUBCUTANEOUS at 12:09

## 2025-04-08 RX ADMIN — BENZTROPINE MESYLATE 1 MG: 1 TABLET ORAL at 21:58

## 2025-04-08 RX ADMIN — BENZTROPINE MESYLATE 1 MG: 1 TABLET ORAL at 09:02

## 2025-04-08 RX ADMIN — INSULIN LISPRO 5 UNITS: 100 INJECTION, SOLUTION INTRAVENOUS; SUBCUTANEOUS at 17:08

## 2025-04-08 RX ADMIN — INSULIN GLARGINE 13 UNITS: 100 INJECTION, SOLUTION SUBCUTANEOUS at 21:59

## 2025-04-08 RX ADMIN — INSULIN LISPRO 1 UNITS: 100 INJECTION, SOLUTION INTRAVENOUS; SUBCUTANEOUS at 17:08

## 2025-04-08 RX ADMIN — LEVOTHYROXINE SODIUM 175 MCG: 0.05 TABLET ORAL at 06:37

## 2025-04-08 NOTE — DIABETES MGMT
(H) 04/07/2025     Lab Results   Component Value Date    TSH 0.81 02/03/2025         Factors impacting BG management  Factor Dose Comments   Nutrition:  Standard meals   60 grams/meal    Pain PRN caetmainophen    Kidney function Normal    Liver function Normal          Assessment and Nursing Intervention   Nursing Diagnosis Risk for unstable blood glucose pattern   Nursing Intervention Domain 5250 Decision-making Support   Nursing Interventions Examined current inpatient diabetes/blood glucose control   Explored factors facilitating and impeding inpatient management  Explored corrective strategies with patient and responsible inpatient provider   Informed patient of rational for insulin strategy while hospitalized     Nursing Diagnosis 90354 Ineffective Health Management   Nursing Intervention Domain 5250 Decision-making Support   Nursing Interventions Identified diabetes self-management practices impeding diabetes control  Discussed diabetes survival skills related to  Healthy Plate eating plan; given handouts  Role of physical activity in improving insulin sensitivity and action  Procedure for blood glucose monitoring & options for low-cost products  Medications plan at discharge     Billing Code(s)   [] 78525 IP initial hospital care - 40 minutes   [] 51722 IP initial hospital care -55 minutes  [] 46693 IP initial hospital care -75 minutes  []        22585 IP subsequent hospital care - 50 minutes   [] 09521 IP subsequent hospital care - 35 minutes   [x] 20611 IP subsequent hospital care - 25 minutes       Before making these care recommendations, I personally reviewed the hospitalization record, including notes, laboratory & diagnostic data and current medications, and examined the patient at the bedside.  Total minutes: 25 minutes    BRAULIO Leal - CNS   Diabetes Clinical Nurse Specialist   [facility]  Access via [employer]

## 2025-04-09 VITALS
HEIGHT: 65 IN | WEIGHT: 145 LBS | OXYGEN SATURATION: 95 % | TEMPERATURE: 97.7 F | DIASTOLIC BLOOD PRESSURE: 60 MMHG | RESPIRATION RATE: 16 BRPM | SYSTOLIC BLOOD PRESSURE: 147 MMHG | BODY MASS INDEX: 24.16 KG/M2 | HEART RATE: 87 BPM

## 2025-04-09 LAB
GLUCOSE BLD STRIP.AUTO-MCNC: 198 MG/DL (ref 65–117)
GLUCOSE BLD STRIP.AUTO-MCNC: 262 MG/DL (ref 65–117)
GLUCOSE BLD STRIP.AUTO-MCNC: 96 MG/DL (ref 65–117)
SERVICE CMNT-IMP: ABNORMAL
SERVICE CMNT-IMP: ABNORMAL
SERVICE CMNT-IMP: NORMAL

## 2025-04-09 PROCEDURE — 82962 GLUCOSE BLOOD TEST: CPT

## 2025-04-09 PROCEDURE — 6370000000 HC RX 637 (ALT 250 FOR IP)

## 2025-04-09 PROCEDURE — 6370000000 HC RX 637 (ALT 250 FOR IP): Performed by: INTERNAL MEDICINE

## 2025-04-09 PROCEDURE — 6370000000 HC RX 637 (ALT 250 FOR IP): Performed by: STUDENT IN AN ORGANIZED HEALTH CARE EDUCATION/TRAINING PROGRAM

## 2025-04-09 PROCEDURE — 6360000002 HC RX W HCPCS: Performed by: STUDENT IN AN ORGANIZED HEALTH CARE EDUCATION/TRAINING PROGRAM

## 2025-04-09 RX ORDER — INSULIN DEGLUDEC 100 U/ML
13 INJECTION, SOLUTION SUBCUTANEOUS NIGHTLY
Qty: 6 ADJUSTABLE DOSE PRE-FILLED PEN SYRINGE | Refills: 5 | Status: SHIPPED | OUTPATIENT
Start: 2025-04-09 | End: 2025-04-09

## 2025-04-09 RX ORDER — INSULIN LISPRO 100 [IU]/ML
5 INJECTION, SOLUTION INTRAVENOUS; SUBCUTANEOUS
Status: DISCONTINUED | OUTPATIENT
Start: 2025-04-09 | End: 2025-04-09 | Stop reason: HOSPADM

## 2025-04-09 RX ORDER — INSULIN LISPRO 100 [IU]/ML
4 INJECTION, SOLUTION INTRAVENOUS; SUBCUTANEOUS
Status: DISCONTINUED | OUTPATIENT
Start: 2025-04-09 | End: 2025-04-09

## 2025-04-09 RX ORDER — INSULIN ASPART 100 [IU]/ML
5 INJECTION, SOLUTION INTRAVENOUS; SUBCUTANEOUS
Qty: 9 ML | Refills: 5 | Status: SHIPPED | OUTPATIENT
Start: 2025-04-09

## 2025-04-09 RX ORDER — INSULIN DEGLUDEC 100 U/ML
13 INJECTION, SOLUTION SUBCUTANEOUS NIGHTLY
Qty: 6 ADJUSTABLE DOSE PRE-FILLED PEN SYRINGE | Refills: 5 | Status: SHIPPED | OUTPATIENT
Start: 2025-04-09

## 2025-04-09 RX ORDER — INSULIN ASPART 100 [IU]/ML
5 INJECTION, SOLUTION INTRAVENOUS; SUBCUTANEOUS
Qty: 9 ML | Refills: 5 | Status: SHIPPED | OUTPATIENT
Start: 2025-04-09 | End: 2025-04-09

## 2025-04-09 RX ADMIN — BENZTROPINE MESYLATE 1 MG: 1 TABLET ORAL at 09:44

## 2025-04-09 RX ADMIN — LEVOTHYROXINE SODIUM 175 MCG: 0.05 TABLET ORAL at 06:34

## 2025-04-09 RX ADMIN — METOPROLOL SUCCINATE 25 MG: 25 TABLET, EXTENDED RELEASE ORAL at 09:44

## 2025-04-09 RX ADMIN — PRAVASTATIN SODIUM 80 MG: 40 TABLET ORAL at 09:44

## 2025-04-09 RX ADMIN — ENOXAPARIN SODIUM 40 MG: 100 INJECTION SUBCUTANEOUS at 09:44

## 2025-04-09 RX ADMIN — BRIMONIDINE TARTRATE 1 DROP: 2 SOLUTION OPHTHALMIC at 09:46

## 2025-04-09 RX ADMIN — INSULIN LISPRO 2 UNITS: 100 INJECTION, SOLUTION INTRAVENOUS; SUBCUTANEOUS at 14:01

## 2025-04-09 RX ADMIN — INSULIN LISPRO 5 UNITS: 100 INJECTION, SOLUTION INTRAVENOUS; SUBCUTANEOUS at 14:01

## 2025-04-09 RX ADMIN — CLOPIDOGREL BISULFATE 75 MG: 75 TABLET, FILM COATED ORAL at 09:44

## 2025-04-09 ASSESSMENT — PAIN SCALES - GENERAL: PAINLEVEL_OUTOF10: 0

## 2025-04-09 NOTE — DISCHARGE SUMMARY
Hospitalist Discharge Note    NAME:   Brooklynn Mathews   : 1940   MRN: [id number]     Admit date: 2025    Discharge date: 25    PCP: Isac Otoole MD    Discharge Diagnoses:    Recurrent hypoglycemia BS 52 to 55 on insulin POA    Type 2 diabetes on chronic insulin, uncontrolled POA A1c 8.8, very brittle    Essential hypertension POA    Hypotension on home blood pressure medicines    Prior strokes POA    Hyperlipidemia POA    Hypothyroidism on replacement    Glaucoma POA     Dementia    Body mass index is 24.13 kg/m².      Code Status: Full Code    Discharge Medications:  Current Discharge Medication List        CONTINUE these medications which have CHANGED    Details   insulin aspart (NOVOLOG FLEXPEN) 100 UNIT/ML injection pen Inject 5 Units into the skin 3 times daily (before meals) Hold if doses not eat meal  Qty: 9 mL, Refills: 5      Insulin Degludec (TRESIBA FLEXTOUCH) 100 UNIT/ML SOPN Inject 13 Units into the skin at bedtime INJECT 16 UNITS UNDER THE SKIN ONCE DAILY  Qty: 6 Adjustable Dose Pre-filled Pen Syringe, Refills: 5           CONTINUE these medications which have NOT CHANGED    Details   benztropine (COGENTIN) 1 MG tablet TAKE 1 TABLET BY MOUTH TWICE A DAY  Qty: 180 tablet, Refills: 3      !! Continuous Glucose Sensor (FREESTYLE TANYA 2 SENSOR) MISC Blood sugar checks before meals and at bedtime and as needed  Qty: 2 each, Refills: 11      Continuous Glucose  (FREESTYLE TANYA 2 READER) MARY Blood sugar checks before meals and at bedtime and as needed  Qty: 1 each, Refills: 0    Associated Diagnoses: Hyperglycemia due to type 1 diabetes mellitus (HCC)      brimonidine (ALPHAGAN P) 0.15 % ophthalmic solution Place 2 drops into both eyes daily  Qty: 15 mL, Refills: 11      traZODone (DESYREL) 50 MG tablet Take 1 tablet by mouth nightly  Qty: 90 tablet, Refills: 3      pravastatin (PRAVACHOL) 80 MG tablet Take 1 tablet by mouth daily  Qty: 90 tablet, Refills: 3    Associated 
100-25 MG per tablet  Commonly known as: HYZAAR     zinc oxide 20 % ointment               Where to Get Your Medications        These medications were sent to [id number] - [place], [place] - [address] - P [phone] - F [phone]  [address] [place] [facility] [address]      Hours: 24-hours Phone: [phone]   NovoLOG FlexPen 100 UNIT/ML injection pen  Tresiba FlexTouch 100 UNIT/ML Sopn            Significant Diagnostic Studies:   Recent Labs     04/07/25  0610   WBC 4.6   HGB 10.7*   HCT 32.9*        Recent Labs     04/07/25  0610      K 3.9      CO2 25   BUN 20   ALT 16     Lab Results   Component Value Date/Time    TSH 0.81 02/03/2025 02:30 PM          Other medical conditions are listed in the active hospital problem list section; these and other pertinent data were taken into consideration when the treatment plan was developed and customized to this patient's unique overall circumstances and needs.         Today total floor/unit time was >30 minutes while caring for this patient and greater than 50% of that time was spent with patient (and/or family) coordinating patient's clinical issues.     Rula Mccurdy PA-C  4/9/2025

## 2025-04-09 NOTE — PLAN OF CARE
Problem: Chronic Conditions and Co-morbidities  Goal: Patient's chronic conditions and co-morbidity symptoms are monitored and maintained or improved  4/3/2025 1215 by Stacy Saucedo RN  Outcome: Progressing  4/2/2025 2340 by Ofelia Tamez RN  Outcome: Progressing  Flowsheets (Taken 4/2/2025 1930)  Care Plan - Patient's Chronic Conditions and Co-Morbidity Symptoms are Monitored and Maintained or Improved: Monitor and assess patient's chronic conditions and comorbid symptoms for stability, deterioration, or improvement     Problem: Risk for Elopement  Goal: Patient will not exit the unit/facility without proper excort  4/3/2025 1215 by Stacy Saucedo RN  Outcome: Progressing  Flowsheets (Taken 4/3/2025 1008)  Nursing Interventions for Elopement Risk: Assist with personal care needs such as toileting, eating, dressing, as needed to reduce the risk of wandering  4/2/2025 2340 by Ofelia Tamez RN  Outcome: Progressing  Flowsheets (Taken 4/2/2025 1930)  Nursing Interventions for Elopement Risk: Assist with personal care needs such as toileting, eating, dressing, as needed to reduce the risk of wandering     Problem: Skin/Tissue Integrity  Goal: Skin integrity remains intact  Description: 1.  Monitor for areas of redness and/or skin breakdown  2.  Assess vascular access sites hourly  3.  Every 4-6 hours minimum:  Change oxygen saturation probe site  4.  Every 4-6 hours:  If on nasal continuous positive airway pressure, respiratory therapy assess nares and determine need for appliance change or resting period  4/3/2025 1215 by Stacy Saucedo RN  Outcome: Progressing  4/2/2025 2340 by Ofelia Tamez RN  Outcome: Progressing  Flowsheets (Taken 4/2/2025 1930)  Skin Integrity Remains Intact:   Assess vascular access sites hourly   Monitor for areas of redness and/or skin breakdown     Problem: Safety - Adult  Goal: Free from fall injury  4/3/2025 1215 by Stacy Saucedo RN  Outcome: Progressing  4/2/2025 2340 by Ofelia Tamez 
  Problem: Chronic Conditions and Co-morbidities  Goal: Patient's chronic conditions and co-morbidity symptoms are monitored and maintained or improved  4/5/2025 0208 by Cyndie Mathews RN  Outcome: Progressing  4/5/2025 0207 by Cyndie Mathews RN  Outcome: Progressing  Flowsheets (Taken 4/4/2025 1955)  Care Plan - Patient's Chronic Conditions and Co-Morbidity Symptoms are Monitored and Maintained or Improved: Monitor and assess patient's chronic conditions and comorbid symptoms for stability, deterioration, or improvement  4/4/2025 1442 by Nina Sebastian RN  Outcome: Progressing     Problem: Discharge Planning  Goal: Discharge to home or other facility with appropriate resources  4/5/2025 0208 by Cyndie Mathews RN  Outcome: Progressing  4/5/2025 0207 by Cyndie Mathews RN  Outcome: Progressing  Flowsheets (Taken 4/4/2025 1955)  Discharge to home or other facility with appropriate resources: Identify barriers to discharge with patient and caregiver  4/4/2025 1442 by Nina Sebastian RN  Outcome: Progressing     Problem: Risk for Elopement  Goal: Patient will not exit the unit/facility without proper excort  4/5/2025 0208 by Cyndie Mathews RN  Outcome: Progressing  4/5/2025 0207 by Cyndie Mathews RN  Outcome: Progressing  Flowsheets (Taken 4/4/2025 1955)  Nursing Interventions for Elopement Risk: Assist with personal care needs such as toileting, eating, dressing, as needed to reduce the risk of wandering  4/4/2025 1442 by Nina Sebastian RN  Outcome: Progressing     Problem: Safety - Adult  Goal: Free from fall injury  4/5/2025 0208 by Cyndie Mathews RN  Outcome: Progressing  4/4/2025 1442 by Nina Sebastian RN  Outcome: Progressing     Problem: Skin/Tissue Integrity  Goal: Skin integrity remains intact  Description: 1.  Monitor for areas of redness and/or skin breakdown  2.  Assess vascular access sites hourly  3.  Every 4-6 hours minimum:  Change oxygen saturation probe site  4.  Every 4-6 hours:  If on nasal 
  Problem: Chronic Conditions and Co-morbidities  Goal: Patient's chronic conditions and co-morbidity symptoms are monitored and maintained or improved  4/6/2025 1923 by Jaspal Russ RN  Outcome: Progressing  4/6/2025 1235 by Gala Richey RN  Outcome: Progressing     Problem: Discharge Planning  Goal: Discharge to home or other facility with appropriate resources  4/6/2025 1923 by Jaspal Russ RN  Outcome: Progressing  4/6/2025 1235 by Gala Richey RN  Outcome: Progressing     Problem: Risk for Elopement  Goal: Patient will not exit the unit/facility without proper excort  4/6/2025 1923 by Jaspal Russ RN  Outcome: Progressing  4/6/2025 1235 by Gala Richey RN  Outcome: Progressing     
  Problem: Chronic Conditions and Co-morbidities  Goal: Patient's chronic conditions and co-morbidity symptoms are monitored and maintained or improved  Outcome: Progressing     Problem: Discharge Planning  Goal: Discharge to home or other facility with appropriate resources  Outcome: Progressing     Problem: Risk for Elopement  Goal: Patient will not exit the unit/facility without proper excort  Outcome: Progressing     Problem: Skin/Tissue Integrity  Goal: Skin integrity remains intact  Description: 1.  Monitor for areas of redness and/or skin breakdown  2.  Assess vascular access sites hourly  3.  Every 4-6 hours minimum:  Change oxygen saturation probe site  4.  Every 4-6 hours:  If on nasal continuous positive airway pressure, respiratory therapy assess nares and determine need for appliance change or resting period  Outcome: Progressing     Problem: Safety - Adult  Goal: Free from fall injury  Outcome: Progressing     
  Problem: Chronic Conditions and Co-morbidities  Goal: Patient's chronic conditions and co-morbidity symptoms are monitored and maintained or improved  Outcome: Progressing     Problem: Discharge Planning  Goal: Discharge to home or other facility with appropriate resources  Outcome: Progressing     Problem: Risk for Elopement  Goal: Patient will not exit the unit/facility without proper excort  Outcome: Progressing     Problem: Skin/Tissue Integrity  Goal: Skin integrity remains intact  Description: 1.  Monitor for areas of redness and/or skin breakdown  2.  Assess vascular access sites hourly  3.  Every 4-6 hours minimum:  Change oxygen saturation probe site  4.  Every 4-6 hours:  If on nasal continuous positive airway pressure, respiratory therapy assess nares and determine need for appliance change or resting period  Outcome: Progressing     Problem: Safety - Adult  Goal: Free from fall injury  Outcome: Progressing     
  Problem: Discharge Planning  Goal: Discharge to home or other facility with appropriate resources  4/6/2025 0158 by Ciara Root, RN  Outcome: Progressing  4/5/2025 1239 by Gala Richey RN  Outcome: Progressing     Problem: Skin/Tissue Integrity  Goal: Skin integrity remains intact  Description: 1.  Monitor for areas of redness and/or skin breakdown  2.  Assess vascular access sites hourly  3.  Every 4-6 hours minimum:  Change oxygen saturation probe site  4.  Every 4-6 hours:  If on nasal continuous positive airway pressure, respiratory therapy assess nares and determine need for appliance change or resting period  4/6/2025 0158 by Ciara Root, RN  Outcome: Progressing  4/5/2025 1239 by Gala Richey RN  Outcome: Progressing     Problem: Safety - Adult  Goal: Free from fall injury  4/6/2025 0158 by Ciara Root, RN  Outcome: Progressing  4/5/2025 1239 by Gala Richey, RN  Outcome: Progressing     
  Problem: Physical Therapy - Adult  Goal: By Discharge: Performs mobility at highest level of function for planned discharge setting.  See evaluation for individualized goals.  Description: FUNCTIONAL STATUS PRIOR TO ADMISSION: pt lives with family, granddtr is main caregiver per notes. She needs assist with all ADLs. She has access to a cane but unclear if she used it. She has hx of dementia and has 24 hr assist/supervision    HOME SUPPORT PRIOR TO ADMISSION: The patient lived with family.    Physical Therapy Goals  Initiated 4/4/2025  1.  Patient will move from supine to sit and sit to supine, scoot up and down, and roll side to side in bed with supervision/set-up within 7 day(s).    2.  Patient will perform sit to stand with supervision/set-up within 7 day(s).  3.  Patient will transfer from bed to chair and chair to bed with supervision/set-up using the least restrictive device within 7 day(s).  4.  Patient will ambulate with supervision/set-up for 150 feet with the least restrictive device within 7 day(s).   5.  Patient will ascend/descend 5 stairs with handrail(s) with minimal assistance within 7 day(s).   Outcome: Progressing   PHYSICAL THERAPY EVALUATION    Patient: Brooklynn Mathews (84 y.o. female)  Date: 4/4/2025  Primary Diagnosis: Hypoglycemia [E16.2]       Precautions: Restrictions/Precautions  Restrictions/Precautions: Fall Risk, Bed Alarm            ASSESSMENT :   DEFICITS/IMPAIRMENTS:   The patient is limited by decreased functional mobility, independence in ADLs, strength, activity tolerance, balance, hypotension. Pt is pleasant but very confused. Only oriented to self but not how old she is or present life situation. She follows commands with repetition and guiding and visual cues. She is overall CGA to min A for safety; ambulates with HHA. During amb, when asked pt stated she was a little dizzy but no indicators and pt talking on; took BP when back to chair and found to be 87/52 and 88/45 on 
  Problem: Risk for Elopement  Goal: Patient will not exit the unit/facility without proper excort  Outcome: Progressing     Problem: Skin/Tissue Integrity  Goal: Skin integrity remains intact  Description: 1.  Monitor for areas of redness and/or skin breakdown  2.  Assess vascular access sites hourly  3.  Every 4-6 hours minimum:  Change oxygen saturation probe site  4.  Every 4-6 hours:  If on nasal continuous positive airway pressure, respiratory therapy assess nares and determine need for appliance change or resting period  Outcome: Progressing     Problem: Safety - Adult  Goal: Free from fall injury  Outcome: Progressing     Problem: Chronic Conditions and Co-morbidities  Goal: Patient's chronic conditions and co-morbidity symptoms are monitored and maintained or improved  Outcome: Progressing     
D/C  
Problem: Chronic Conditions and Co-morbidities  Goal: Patient's chronic conditions and co-morbidity symptoms are monitored and maintained or improved  Outcome: Progressing     Problem: Discharge Planning  Goal: Discharge to home or other facility with appropriate resources  4/6/2025 1235 by Gala Richey RN  Outcome: Progressing  4/6/2025 0158 by Ciara Root RN  Outcome: Progressing     Problem: Risk for Elopement  Goal: Patient will not exit the unit/facility without proper excort  Outcome: Progressing     Problem: Skin/Tissue Integrity  Goal: Skin integrity remains intact  Description: 1.  Monitor for areas of redness and/or skin breakdown  2.  Assess vascular access sites hourly  3.  Every 4-6 hours minimum:  Change oxygen saturation probe site  4.  Every 4-6 hours:  If on nasal continuous positive airway pressure, respiratory therapy assess nares and determine need for appliance change or resting period  4/6/2025 1235 by Gala Richey RN  Outcome: Progressing  4/6/2025 0158 by Ciara Root RN  Outcome: Progressing     Problem: Safety - Adult  Goal: Free from fall injury  4/6/2025 1235 by Gala Richey RN  Outcome: Progressing  4/6/2025 0158 by Ciara Root RN  Outcome: Progressing     
Progressing  4/5/2025 0208 by Cyndie Mathews, RN  Outcome: Progressing  Flowsheets (Taken 4/4/2025 1955)  Skin Integrity Remains Intact: Monitor for areas of redness and/or skin breakdown     Problem: Safety - Adult  Goal: Free from fall injury  4/5/2025 1239 by Gala Richey, RN  Outcome: Progressing  4/5/2025 0208 by Cyndie Mathews, RN  Outcome: Progressing     
injury  4/2/2025 2340 by Ofelia Tamez, RN  Outcome: Progressing  Flowsheets (Taken 4/2/2025 1930)  Free From Fall Injury: Instruct family/caregiver on patient safety  4/2/2025 1723 by Stacy Saucedo, RN  Outcome: Progressing

## 2025-04-09 NOTE — DISCHARGE INSTRUCTIONS
Take long-acting Tresiba insulin 13 units at bedtime    Use 5 units of short acting NovoLog insulin with meals breakfast lunch and dinner   If Ms. Mathews does not eat, then hold the short acting NovoLog insulin   Would also hold the NovoLog insulin if the Premeal blood sugars less than 90    Check blood sugars before meals and record for Dr. Otoole to review    Initial goal blood sugars are 100-200 range, want to avoid any blood sugars less than 80    Blood pressure running low in the hospital   We held the losartan hydrochlorothiazide and the amlodipine for now    These may need to be added back later     Continue with the Toprol-XL as before

## 2025-04-09 NOTE — CARE COORDINATION
Pt is clear from CM standpoint for d/c.     Pt's family will transport.     Transition of Care Plan:     RUR: 16%  Prior Level of Functioning: Assistance   Disposition: Home with family and home health-[facility]   RHINA: 4/4/25  If SNF or IPR: Date FOC offered:   Date FOC received:   Accepting facility:   Date authorization started with reference number:   Date authorization received and expires:   Follow up appointments: PCP   DME needed: No DME needed   Transportation at discharge: Pt's family will transport   IM/IMM Medicare/ letter given: 4/8/25  Is patient a [place] and connected with [facility]? No              If yes, was  transfer form completed and VA notified? No  Caregiver Contact: pt's dtr   Discharge Caregiver contacted prior to discharge? Pt's dtr was contacted   Care Conference needed? No  Barriers to discharge: None      Kia Eastman      
Pt is clear from CM standpoint for d/c.     Pt's family will transport.     Transition of Care Plan:     RUR: 16%  Prior Level of Functioning: Assistance   Disposition: Home with family and home health-[facility]   RHINA: 4/4/25  If SNF or IPR: Date FOC offered:   Date FOC received:   Accepting facility:   Date authorization started with reference number:   Date authorization received and expires:   Follow up appointments: PCP   DME needed: No DME needed   Transportation at discharge: Pt's family will transport   IM/IMM Medicare/ letter given: 4/8/25  Is patient a [place] and connected with [facility]? No              If yes, was  transfer form completed and VA notified? No  Caregiver Contact: pt's dtr   Discharge Caregiver contacted prior to discharge? Pt's dtr was contacted   Care Conference needed? No  Barriers to discharge: None     Kia Eastman      
Pt is clear from CM standpoint for d/c.    Pt's family will transport.    Transition of Care Plan:    RUR: 14%  Prior Level of Functioning: Assistance   Disposition: Home with family and home health-[facility]   RHINA: 4/4/25  If SNF or IPR: Date FOC offered:   Date FOC received:   Accepting facility:   Date authorization started with reference number:   Date authorization received and expires:   Follow up appointments: PCP   DME needed: No DME needed   Transportation at discharge: Pt's family will transport   IM/IMM Medicare/ letter given: 4/4/25  Is patient a [place] and connected with [facility]? No   If yes, was  transfer form completed and VA notified? No  Caregiver Contact: pt's dtr   Discharge Caregiver contacted prior to discharge? Pt's dtr and pt's son was contacted   Care Conference needed? No  Barriers to discharge: None          04/04/25 1352   Services At/After Discharge   Transition of Care Consult (CM Consult) Home Health   Services At/After Discharge Home Health   [place] Resource Information Provided? No   Mode of Transport at Discharge Self   Confirm Follow Up Transport Family   Condition of Participation: Discharge Planning   The Plan for Transition of Care is related to the following treatment goals: Goal is to return home with home health   The Patient and/or Patient Representative was provided with a Choice of Provider? Patient Representative   Name of the Patient Representative who was provided with the Choice of Provider and agrees with the Discharge Plan?  Pt's son Anoop Mathews   The Patient and/Or Patient Representative agree with the Discharge Plan? Yes   Freedom of Choice list was provided with basic dialogue that supports the patient's individualized plan of care/goals, treatment preferences, and shares the quality data associated with the providers?  Yes       
Anoop Mathews - Child - [phone]    Primary Decision Maker: Vitaly Mathews - Child - [phone]    Primary Decision Maker: Eulogio Mathews - Child - [phone]    Primary Decision Maker: AcJaleny - Child - [phone]     Today we documented Decision Maker(s) consistent with Legal Next of Kin hierarchy.    Content/Action Overview:  Has ACP document(s) NOT on file - requested patient to provide  Reviewed DNR/DNI and patient elects Full Code (Attempt Resuscitation)      Length of Voluntary ACP Conversation in minutes:  <16 minutes (Non-Billable)    ULISSES Lozada.  Care Manager, [facility]  [phone]/Available on Perfect Serve

## 2025-04-10 NOTE — PROGRESS NOTES
Hospitalist Progress Note    NAME:   Brooklynn Mathews   : 1940   MRN: [id number]     Date: 4/3/2025    Patient PCP: Isac Otoole MD    Hospital Problem list:     Recurrent hypoglycemia BS 52 to 55 POA dropped again today  Type 2 diabetes on chronic insulin, uncontrolled POA A1c 8.8  Here with second ER visit 2 days for recurrent hypoglycemia  Glucoses in the 50s  Initially on D5W with frequent glucose checks              Patient eventually pulled out IV, refused replacement  Blood sugars eventually kain in the 300-400 range off insulin, needs some insulin   She has very brittle diabetes   Very pre-disposed to blood sugar dropping, dropped back into 50s on low-dose insulin  Lantus 10 units at bedtime and low-dose sliding scale   Humalog 3 units qAC  Blood sugar still very labile  Appreciate DTC input   Increased the Humalog to 5 units   Increase Lantus to 13 unit   C/o blood sugars to overnight  Goal is get the blood sugar 150-200 range initially   PCP appointment set up for next  Blood sugar finally improved, not dropping to 50 or going over 400   Blood sugar 126, 148, 201, 192 today  Spoke with daughter by phone, discussed changes to insulin  Discussed plan for discharge in AM now that the blood sugars if stable    Essential hypertension POA  Hypotension on home blood pressure medicines  Started back on her home Antihypertensives at admit  Blood pressure dropping to 80s during stay  Began to reduce her blood pressure medicines  Change losartan to 50 mg without HCTZ, then stopped  Reduced amlodipine to 2.5 mg then stopped  Continue metoprolol at reduced dose  Reduce blood pressure medicines discussed with daughter    Hypothyroidism  Continue home Synthroid  TSH 0.81     Dementia  Delirium precautions.  Continue home benztropine  Agitated at times, PRN haldol  Currently calm    Body mass index is 24.13 kg/m².      Code Status: Full Code   DVT Prophylaxis: Lovenox     Medical Decision Making:   I 
    Hospitalist Progress Note    NAME:   Brooklynn Mathews   : 1940   MRN: [id number]     Date: 4/3/2025    Patient PCP: Isac Otoole MD    Hospital Problem list:     Recurrent hypoglycemia BS 52 to 55 POA dropped again today  Type 2 diabetes on chronic insulin POA   Here with second ER visit 2 days for recurrent hypoglycemia  Glucoses in the 50s  Unable to provide history or med rec however noted insulin on home med list  Initially on D5W with frequent glucose checks              Patient eventually pulled out IV, refused repacement              BS now in 250 -300 range  Encourage p.o. intake  Start Lantus 8 units at bedtime and low-dose sliding scale   BS dropped this AM again  Blood sugar very brittle, will add 2 units Premeal insulin plus 8 of Lantus at bedtime  Goal is to get the blood sugar less than 200     Hypothyroidism  Continue home Synthroid  TSH 0.81     Hypertension  Blood pressure dropping to 80s  Change losartan to 50 mg without HCTZ, reduced amlodipine   Still dropping   Plan to hold losartan in the morning  Continue metoprolol  Discharge in a.m. if the blood pressure stable     Dementia  Delirium precautions.-Continue home benztropine  Agitated at times, PRN haldol  Currently calm    Body mass index is 24.13 kg/m².      Code Status: Full Code   DVT Prophylaxis: Lovenox     Medical Decision Making:   I personally reviewed labs: Yes  I personally reviewed imaging:  I personally reviewed EKG: N/A  Toxic drug monitoring: N/A  Discussed case with: patient, NS       History, assessment and plan for 2025:     Estimated discharge date:2024    Needs to be done before discharge:  Stable blood sugar and blood pressure, was hypotensive this afternoon    Reason for physician visit:    \"I feel okay\"  discussed with RN events overnight.   Alert, no complaints, very confused but talking  Blood sugar and blood pressure still Dropping today, adjusting treatment    No HA, SOB, cough, CP, abdominal 
  Patient determined to be stable for discharge by attending provider. I have reviewed the discharge instructions and follow-up appointments with Dr. Otoole. They verbalized understanding and all questions were answered to their satisfaction. No complaints or further questions were expressed.       New medications: Appropriate educational materials and medication side effect teaching were provided.       Patient did not have a PIV.    All personal items collected during admission were returned to the patient prior to discharge. D/c instructions reviewed with daughter.    NAYA HOPPER RN   
  Patient determined to be stable for discharge by attending provider. I have reviewed the discharge instructions and follow-up appointments with the PCP. They verbalized understanding and all questions were answered to their satisfaction. No complaints or further questions were expressed.       New medications: Appropriate educational materials and medication side effect teaching were provided.       PIV were removed prior to discharge.     All personal items collected during admission were returned to the patient prior to discharge.    NAYA HOPPER RN   
  Physician Progress Note      PATIENT:               ASHLEIGH VIERA  CSN #:                  [id number]  :                       1940  ADMIT DATE:       2025 7:16 PM  DISCH DATE:        2025 8:03 PM  RESPONDING  PROVIDER #:        JOSE QUEVEDO          QUERY TEXT:    Dementia is documented in the medical record in MD PN by JENN Chavarria MD on   25. Please specify the severity and any associated behavioral   disturbances:    The clinical indicators include:  83 yo female with dementia and agitation     MD PN by JENN Chavarria MD:  Dementia  Unable to provide any relevant history and no family are available  Delirium precautions. -Continue home benztropine  Agitated at times, PRN haldol  Options provided:  -- Mild dementia with agitation  -- Other - I will add my own diagnosis  -- Disagree - Not applicable / Not valid  -- Disagree - Clinically unable to determine / Unknown  -- Refer to Clinical Documentation Reviewer    PROVIDER RESPONSE TEXT:    Moderate dementia with agitation    Query created by: Sheryl Herrera on 4/3/2025 10:50 AM      Electronically signed by:  JOSE QUEVEDO 4/10/2025 9:36 AM          
Bladder scanned 426. Pt got up to the bathroom with x1 assist and tried to urinate. Pt not able to pee. Called Dr. Otoole & left voicemail.    
Dr. Ootole called and said he's not coming this facility anymore. [facility] Dr. Estrada again to relay the message.  
End of Shift Note    Bedside shift change report given to AMANDA Bhatt (oncoming nurse) by Mario Hopkins RN (offgoing nurse).  Report included the following information SBAR, Kardex, and MAR    Shift worked:  7p-7a     Shift summary and any significant changes:    Patient met resting calmly in bed, she tolerated her care fairly well, VSS, Due medications given per MAR, patient got out from bed during the shift but was redirected to bed, her personal hygiene care done,      Concerns for physician to address:       Zone phone for oncoming shift:   [phone]       Activity:  Level of Assistance: Maximum assist, patient does 25-49%  Number times ambulated in hallways past shift: 1  Number of times OOB to chair past shift: 1    Cardiac:   Cardiac Monitoring: No      Cardiac Rhythm: Sinus rhythm    Access:  Current line(s): PIV     Genitourinary:   Urinary Status: Voiding, Incontinent briefs    Respiratory:   O2 Device: None (Room air)  Chronic home O2 use?: NO  Incentive spirometer at bedside: NO    GI:  Last BM (including prior to admit): 04/01/25  Current diet:  ADULT DIET; Regular  Passing flatus: YES    Pain Management:   Patient states pain is manageable on current regimen: N/A    Skin:  Lev Scale Score: 18  Interventions: Wound Offloading (Prevention Methods): Pillows, Repositioning    Patient Safety:  Fall Risk: Nursing Judgement-Fall Risk High(Add Comments): Yes  Fall Risk Interventions  Nursing Judgement-Fall Risk High(Add Comments): Yes  Toilet Every 2 Hours-In Advance of Need: Yes  Hourly Visual Checks: Eyes closed, In bed  Fall Visual Posted: Armband, Socks, Fall sign posted  Room Door Open: Yes  Alarm On: Bed  Patient Moved Closer to Nursing Station: No    Active Consults:   IP CONSULT TO CASE MANAGEMENT  IP CONSULT TO DIABETES MANAGEMENT    Length of Stay:  Expected LOS: 4  Actual LOS: 5    Mario Hopkins, AMANDA                            
End of Shift Note    Bedside shift change report given to AMANDA Boyer (oncoming nurse) by Ofelia Tamez RN (offgoing nurse).  Report included the following information SBAR, MAR, Accordion, and Recent Results    Shift worked:  7P-7A     Shift summary and any significant changes:    Patient tolerated care well. Patient resting in bed with eyes closed most of shift. Patient very lethargic, unable to give morning medication. Hourly rounding completed. Sitter in room with patient.     Concerns for physician to address:       Zone phone for oncoming shift:          Activity:  Level of Assistance: Standby assist, set-up cues, supervision of patient - no hands on  Number times ambulated in hallways past shift: 0  Number of times OOB to chair past shift: 0    Cardiac:   Cardiac Monitoring: Yes           Access:  Current line(s): PIV     Genitourinary:        Respiratory:   O2 Device: None (Room air)  Chronic home O2 use?: NO  Incentive spirometer at bedside: NO    GI:  Last BM (including prior to admit): 04/01/25  Current diet:  ADULT DIET; Regular  Passing flatus: YES    Pain Management:   Patient states pain is manageable on current regimen: YES    Skin:  Lev Scale Score: 18  Interventions: Wound Offloading (Prevention Methods): Repositioning, Pillows    Patient Safety:  Fall Risk: Nursing Judgement-Fall Risk High(Add Comments): Yes  Fall Risk Interventions  Nursing Judgement-Fall Risk High(Add Comments): Yes  Toilet Every 2 Hours-In Advance of Need: Yes  Hourly Visual Checks: In bed, Eyes closed  Fall Visual Posted: Armband, Socks  Room Door Open: Deferred to promote rest  Alarm On: Other (Comment) (sitter)  Patient Moved Closer to Nursing Station: No    Active Consults:   None    Length of Stay:  Expected LOS: 3  Actual LOS: 1    Ofelia Tamez RN                            
End of Shift Note    Bedside shift change report given to AMANDA Cortes (oncoming nurse) by Gala Richey RN (offgoing nurse).  Report included the following information SBAR, Kardex, and MAR    Shift worked:  0700 - 1930     Shift summary and any significant changes:     Patient continues to be pleasantly confused during shift. AM medications given crushed with applesauce. Yolanda Sitter continued at bedside. Blood sugars high this morning, with 4 units of insulin cover given at 1040 and notified Dr. Chavarria. Fluctuating blood sugars throughout the day. Diabetes Management consult order placed. Incontinence care completed. Gown, linens, bed pad, and brief changed.     Concerns for physician to address:  Patient constipated, PRN stool softener requested.     Zone phone for oncoming shift:   [phone]       Activity:  Level of Assistance: Maximum assist, patient does 25-49%  Number times ambulated in hallways past shift: 0  Number of times OOB to chair past shift: 0    Cardiac:   Cardiac Monitoring: No      Cardiac Rhythm: Sinus rhythm    Access:  Current line(s): PIV     Genitourinary:   Urinary Status: Voiding, Incontinent    Respiratory:   O2 Device: None (Room air)  Chronic home O2 use?: NO  Incentive spirometer at bedside: NO    GI:  Last BM (including prior to admit): 04/01/25  Current diet:  ADULT DIET; Regular  Passing flatus: NO    Pain Management:   Patient states pain is manageable on current regimen: N/A    Skin:  Lev Scale Score: 18  Interventions: Wound Offloading (Prevention Methods): Pillows, Repositioning    Patient Safety:  Fall Risk: Nursing Judgement-Fall Risk High(Add Comments): Yes  Fall Risk Interventions  Nursing Judgement-Fall Risk High(Add Comments): Yes  Toilet Every 2 Hours-In Advance of Need: Yes  Hourly Visual Checks: In bed, Awake, Confused  Fall Visual Posted: Armband, Fall sign posted, Socks  Room Door Open: Deferred to promote rest  Alarm On: Bed  Patient Moved Closer to Nursing Station: 
End of Shift Note    Bedside shift change report given to AMANDA Gallardo (oncoming nurse) by JIL SAMPSON RN (offgoing nurse).  Report included the following information SBAR, Kardex, Intake/Output, and MAR    Shift worked:  7156-7175     Shift summary and any significant changes:     Patient had no complaints of pain or nausea. Avasys at the bedside for safety. Scheduled medications given per MAR.      Concerns for physician to address:    Zone phone for oncoming shift:       Activity:  Level of Assistance: Maximum assist, patient does 25-49%  Number times ambulated in hallways past shift: 0  Number of times OOB to chair past shift: 0    Cardiac:   Cardiac Monitoring: No      Cardiac Rhythm: Sinus rhythm    Access:  Current line(s): none    Genitourinary:   Urinary Status: Voiding, Incontinent briefs    Respiratory:   O2 Device: None (Room air)  Chronic home O2 use?: N/A  Incentive spirometer at bedside: N/A    GI:  Last BM (including prior to admit): 04/07/25  Current diet:  ADULT DIET; Regular; 4 carb choices (60 gm/meal)  Passing flatus: YES    Pain Management:   Patient states pain is manageable on current regimen: N/A    Skin:  Lev Scale Score: 18  Interventions: Wound Offloading (Prevention Methods): Repositioning, Turning    Patient Safety:  Fall Risk: Nursing Judgement-Fall Risk High(Add Comments): Yes  Fall Risk Interventions  Nursing Judgement-Fall Risk High(Add Comments): Yes  Toilet Every 2 Hours-In Advance of Need: Yes  Hourly Visual Checks: Awake, In bed  Fall Visual Posted: Armband, Socks  Room Door Open: Yes  Alarm On: Bed  Patient Moved Closer to Nursing Station: No    Active Consults:   IP CONSULT TO CASE MANAGEMENT  IP CONSULT TO DIABETES MANAGEMENT    Length of Stay:  Expected LOS: 8  Actual LOS: 6    JIL SAMPSON, RN                            
End of Shift Note    Bedside shift change report given to AMANDA Johnson (oncoming nurse) by Mario Hopkins RN (offgoing nurse).  Report included the following information SBAR, Kardex, and MAR    Shift worked:  7p-7a     Shift summary and any significant changes:    Hyper verbal, oriented X 2, tolerated her care fairly well, schedule medications served per MAR, got out of bed X 2 during the shift, personal hygiene care done, complete bed linen change done     Concerns for physician to address:       Zone phone for oncoming shift:   [phone]         Mario Hopkins, RN                            
End of Shift Note    Bedside shift change report given to Cyndie (oncoming nurse) by Nina Sebastian RN (offgoing nurse).  Report included the following information SBAR, Intake/Output, MAR, and Recent Results    Shift worked:  7A-7P     Shift summary and any significant changes:     Pt AOX1. When PT was working with pt, pt's BP was noted in the 80. RN repeated BP and it was still in the 80s.Dr. Chavarria notified. Pt's discharge d/c'd     Concerns for physician to address:       Zone phone for oncoming shift:          Activity:  Level of Assistance: Moderate assist, patient does 50-74%  Number times ambulated in hallways past shift: 1  Number of times OOB to chair past shift: 0    Cardiac:   Cardiac Monitoring: Yes      Cardiac Rhythm: Sinus rhythm    Access:  Current line(s): Other NONE    Genitourinary:   Urinary Status: Voiding, Incontinent briefs    Respiratory:   O2 Device: None (Room air)  Chronic home O2 use?: NO  Incentive spirometer at bedside: NO    GI:  Last BM (including prior to admit): 04/01/25  Current diet:  ADULT DIET; Regular  Passing flatus: YES    Pain Management:   Patient states pain is manageable on current regimen: YES    Skin:  Lev Scale Score: 18  Interventions: Wound Offloading (Prevention Methods): Repositioning, Pillows    Patient Safety:  Fall Risk: Nursing Judgement-Fall Risk High(Add Comments): Yes  Fall Risk Interventions  Nursing Judgement-Fall Risk High(Add Comments): Yes  Toilet Every 2 Hours-In Advance of Need: Yes  Hourly Visual Checks: Confused  Fall Visual Posted: Fall sign posted, Socks  Room Door Open: Yes  Alarm On: Bed  Patient Moved Closer to Nursing Station: No    Active Consults:   IP CONSULT TO CASE MANAGEMENT    Length of Stay:  Expected LOS: 4  Actual LOS: 2    Nina Sebastian RN                           
Geovani Chavarria for asking assistance to fix the MD's assignment for this pt.  
PCP hospital follow-up transitional care appointment has been scheduled with Dr. Iasc Otoole on 4/14/25 5066. [facility] information on AVS for patient resource.   Pending patient discharge.     
PCP hospital follow-up transitional care appointment has been scheduled with Dr. Isac Otoole on 4/8/25 4643. [facility] information on AVS for patient resource.   Pending patient discharge.     
Physical therapy services attempted 1:06PM. Pt received in bed eating lunch. While present, therapist raised HOB for easier reach and decrease aspiration risk. Pt thanked therapist and told therapist, \"Are you sick? I'm going to call the doctor for you. Go lay down and rest and get better. I don't play\". Pt comfortable and continuing to eat (although repeating that she would notify doctor for therapist) when undersigned departed. Per cursory review of medical chart, ongoing low BP 97/72.  Per medical chart, ?possible dc to home with 24/7 family support today? PT Team will try to provide skilled services at a later date as time permits and as medically appropriate to patient tolerance.    Alessandra Mcclendon, PT, DPT    
Pt confused, not able to get the information for admission questions.  
Pt sleepy whole day. Blood pressure 98/49. Provider contacted through [facility]. Pt not eating much. Discharge order held and provider aware that pt does not have IV access.  
Pt's blood sugar 284, no sliding scale order. Called Dr. Otoole & left voicemail.   
Shift [id number]    Pt pleasantly confused during shift.     Pt had to be redirected several times during shift.     Safety Monitor at bedside.     Incontinence care completed, dominik care and bath done. Gown changed.  
Stayed restless( trying to come out of bed)until MN. Bladder scanned at 2000 and there was minimal urine(120) in the bladder. Took her meds whole with water.Assisted to the BSC but no output noted and immediately she went back to back, she fell asleep. Has been passing large amounts of urine when asleep(incontinent). VSS. B/sugar was 47 then 51 when repeated. Pt able to take 100 ml of orange juice. Repeated in 15 mins and was at 75. For close observation.  
No    Active Consults:   IP CONSULT TO CASE MANAGEMENT    Length of Stay:  Expected LOS: 4  Actual LOS: 3    Gala Richey RN        
  WBC 5.9 3.7 4.8   HGB 12.0 10.8* 11.3*   HCT 35.8 32.7* 34.3*    265 300     Recent Labs     04/01/25 2258 04/03/25 0431 04/04/25 0437   * 135* 139   K 3.7 3.8 3.2*    103 105   CO2 31 28 30   GLUCOSE 52* 307* 93   BUN 17 18 21*   CREATININE 0.65 0.78 0.85   CALCIUM 8.8 8.4* 8.8   MG  --   --  2.0   BILITOT 0.3 0.4 0.5   AST 29 22 17   ALT 20 17 16     Invalid input(s): \"CK\", \"TROPONIN\", \"PBNP\"    Signed: Chance Chavarria Jr, MD          
copied into this note but were reviewed prior to creation of Plan.      LABS:  I reviewed today's most current labs and imaging studies.  Pertinent labs include:  Recent Labs     04/01/25 2258 04/03/25 0431 04/04/25 0437   WBC 5.9 3.7 4.8   HGB 12.0 10.8* 11.3*   HCT 35.8 32.7* 34.3*    265 300     Recent Labs     04/01/25 2258 04/03/25 0431 04/04/25 0437   * 135* 139   K 3.7 3.8 3.2*    103 105   CO2 31 28 30   GLUCOSE 52* 307* 93   BUN 17 18 21*   CREATININE 0.65 0.78 0.85   CALCIUM 8.8 8.4* 8.8   MG  --   --  2.0   BILITOT 0.3 0.4 0.5   AST 29 22 17   ALT 20 17 16     Invalid input(s): \"CK\", \"TROPONIN\", \"PBNP\"    Signed: Chance Chavarria Jr, MD          
input(s): \"CK\", \"TROPONIN\", \"PBNP\"    Signed: Chance Chavarria Jr, MD          
procedures/Xrays and details which were not copied into this note but were reviewed prior to creation of Plan.      LABS:  I reviewed today's most current labs and imaging studies.  Pertinent labs include:  Recent Labs     04/04/25 0437 04/05/25 0536   WBC 4.8 5.5   HGB 11.3* 10.8*   HCT 34.3* 32.4*    271     Recent Labs     04/04/25 0437 04/05/25 0536    141   K 3.2* 3.4*    107   CO2 30 30   GLUCOSE 93 51*   BUN 21* 26*   CREATININE 0.85 0.86   CALCIUM 8.8 8.4*   MG 2.0  --    BILITOT 0.5 0.5   AST 17 15   ALT 16 15     Invalid input(s): \"CK\", \"TROPONIN\", \"PBNP\"    Signed: Chance Chavarria Jr, MD          
were not copied into this note but were reviewed prior to creation of Plan.      LABS:  I reviewed today's most current labs and imaging studies.  Pertinent labs include:  Recent Labs     04/05/25  0536 04/07/25  0610   WBC 5.5 4.6   HGB 10.8* 10.7*   HCT 32.4* 32.9*    266     Recent Labs     04/05/25  0536 04/07/25  0610    137   K 3.4* 3.9    105   CO2 30 25   GLUCOSE 51* 180*   BUN 26* 20   CREATININE 0.86 0.66   CALCIUM 8.4* 8.4*   BILITOT 0.5 1.1*   AST 15 14*   ALT 15 16     Invalid input(s): \"CK\", \"TROPONIN\", \"PBNP\"    Signed: Chance Chavarria Jr, MD

## 2025-04-14 ENCOUNTER — OFFICE VISIT (OUTPATIENT)
Facility: CLINIC | Age: 85
End: 2025-04-14
Payer: MEDICARE

## 2025-04-14 VITALS
BODY MASS INDEX: 22.76 KG/M2 | OXYGEN SATURATION: 100 % | HEART RATE: 64 BPM | TEMPERATURE: 97.8 F | DIASTOLIC BLOOD PRESSURE: 59 MMHG | HEIGHT: 65 IN | SYSTOLIC BLOOD PRESSURE: 118 MMHG | WEIGHT: 136.6 LBS | RESPIRATION RATE: 16 BRPM

## 2025-04-14 DIAGNOSIS — Z09 FOLLOW-UP EXAM: Primary | ICD-10-CM

## 2025-04-14 PROCEDURE — 99214 OFFICE O/P EST MOD 30 MIN: CPT | Performed by: INTERNAL MEDICINE

## 2025-04-14 PROCEDURE — 1123F ACP DISCUSS/DSCN MKR DOCD: CPT | Performed by: INTERNAL MEDICINE

## 2025-04-14 PROCEDURE — 3078F DIAST BP <80 MM HG: CPT | Performed by: INTERNAL MEDICINE

## 2025-04-14 PROCEDURE — 3074F SYST BP LT 130 MM HG: CPT | Performed by: INTERNAL MEDICINE

## 2025-04-14 PROCEDURE — 1126F AMNT PAIN NOTED NONE PRSNT: CPT | Performed by: INTERNAL MEDICINE

## 2025-04-14 SDOH — ECONOMIC STABILITY: FOOD INSECURITY: WITHIN THE PAST 12 MONTHS, YOU WORRIED THAT YOUR FOOD WOULD RUN OUT BEFORE YOU GOT MONEY TO BUY MORE.: NEVER TRUE

## 2025-04-14 SDOH — ECONOMIC STABILITY: FOOD INSECURITY: WITHIN THE PAST 12 MONTHS, THE FOOD YOU BOUGHT JUST DIDN'T LAST AND YOU DIDN'T HAVE MONEY TO GET MORE.: NEVER TRUE

## 2025-04-14 ASSESSMENT — PATIENT HEALTH QUESTIONNAIRE - PHQ9
SUM OF ALL RESPONSES TO PHQ QUESTIONS 1-9: 0
2. FEELING DOWN, DEPRESSED OR HOPELESS: NOT AT ALL
SUM OF ALL RESPONSES TO PHQ QUESTIONS 1-9: 0
1. LITTLE INTEREST OR PLEASURE IN DOING THINGS: NOT AT ALL

## 2025-04-14 ASSESSMENT — ANXIETY QUESTIONNAIRES
4. TROUBLE RELAXING: NOT AT ALL
2. NOT BEING ABLE TO STOP OR CONTROL WORRYING: NOT AT ALL
6. BECOMING EASILY ANNOYED OR IRRITABLE: NOT AT ALL
GAD7 TOTAL SCORE: 0
5. BEING SO RESTLESS THAT IT IS HARD TO SIT STILL: NOT AT ALL
1. FEELING NERVOUS, ANXIOUS, OR ON EDGE: NOT AT ALL
7. FEELING AFRAID AS IF SOMETHING AWFUL MIGHT HAPPEN: NOT AT ALL
3. WORRYING TOO MUCH ABOUT DIFFERENT THINGS: NOT AT ALL
IF YOU CHECKED OFF ANY PROBLEMS ON THIS QUESTIONNAIRE, HOW DIFFICULT HAVE THESE PROBLEMS MADE IT FOR YOU TO DO YOUR WORK, TAKE CARE OF THINGS AT HOME, OR GET ALONG WITH OTHER PEOPLE: NOT DIFFICULT AT ALL

## 2025-04-19 ENCOUNTER — APPOINTMENT (OUTPATIENT)
Facility: HOSPITAL | Age: 85
End: 2025-04-19
Payer: MEDICARE

## 2025-04-19 ENCOUNTER — HOSPITAL ENCOUNTER (EMERGENCY)
Facility: HOSPITAL | Age: 85
Discharge: HOME | End: 2025-04-19
Attending: EMERGENCY MEDICINE
Payer: MEDICARE

## 2025-04-19 VITALS
SYSTOLIC BLOOD PRESSURE: 161 MMHG | DIASTOLIC BLOOD PRESSURE: 54 MMHG | RESPIRATION RATE: 23 BRPM | HEART RATE: 68 BPM | TEMPERATURE: 97.6 F | OXYGEN SATURATION: 97 %

## 2025-04-19 DIAGNOSIS — Z86.39 H/O INSULIN DEPENDENT DIABETES MELLITUS: ICD-10-CM

## 2025-04-19 DIAGNOSIS — R91.1 INCIDENTAL PULMONARY NODULE: ICD-10-CM

## 2025-04-19 DIAGNOSIS — E16.2 HYPOGLYCEMIA: Primary | ICD-10-CM

## 2025-04-19 LAB
ALBUMIN SERPL-MCNC: 2.7 G/DL (ref 3.5–5)
ALBUMIN/GLOB SERPL: 0.6 (ref 1.1–2.2)
ALP SERPL-CCNC: 78 U/L (ref 45–117)
ALT SERPL-CCNC: 18 U/L (ref 12–78)
ANION GAP SERPL CALC-SCNC: 5 MMOL/L (ref 2–12)
AST SERPL-CCNC: 25 U/L (ref 15–37)
BASOPHILS # BLD: 0.03 K/UL (ref 0–0.1)
BASOPHILS NFR BLD: 0.7 % (ref 0–1)
BILIRUB SERPL-MCNC: 0.3 MG/DL (ref 0.2–1)
BUN SERPL-MCNC: 14 MG/DL (ref 6–20)
BUN/CREAT SERPL: 22 (ref 12–20)
CALCIUM SERPL-MCNC: 8.7 MG/DL (ref 8.5–10.1)
CHLORIDE SERPL-SCNC: 109 MMOL/L (ref 97–108)
CO2 SERPL-SCNC: 23 MMOL/L (ref 21–32)
CREAT SERPL-MCNC: 0.65 MG/DL (ref 0.55–1.02)
DIFFERENTIAL METHOD BLD: ABNORMAL
EKG ATRIAL RATE: 80 BPM
EKG DIAGNOSIS: NORMAL
EKG P AXIS: 54 DEGREES
EKG P-R INTERVAL: 158 MS
EKG Q-T INTERVAL: 366 MS
EKG QRS DURATION: 70 MS
EKG QTC CALCULATION (BAZETT): 422 MS
EKG R AXIS: -61 DEGREES
EKG T AXIS: 18 DEGREES
EKG VENTRICULAR RATE: 80 BPM
EOSINOPHIL # BLD: 0.06 K/UL (ref 0–0.4)
EOSINOPHIL NFR BLD: 1.4 % (ref 0–7)
ERYTHROCYTE [DISTWIDTH] IN BLOOD BY AUTOMATED COUNT: 15.2 % (ref 11.5–14.5)
GLOBULIN SER CALC-MCNC: 4.3 G/DL (ref 2–4)
GLUCOSE BLD STRIP.AUTO-MCNC: 109 MG/DL (ref 65–117)
GLUCOSE SERPL-MCNC: 107 MG/DL (ref 65–100)
HCT VFR BLD AUTO: 35.9 % (ref 35–47)
HGB BLD-MCNC: 12.1 G/DL (ref 11.5–16)
IMM GRANULOCYTES # BLD AUTO: 0.01 K/UL (ref 0–0.04)
IMM GRANULOCYTES NFR BLD AUTO: 0.2 % (ref 0–0.5)
LYMPHOCYTES # BLD: 0.72 K/UL (ref 0.8–3.5)
LYMPHOCYTES NFR BLD: 17.2 % (ref 12–49)
MCH RBC QN AUTO: 32.3 PG (ref 26–34)
MCHC RBC AUTO-ENTMCNC: 33.7 G/DL (ref 30–36.5)
MCV RBC AUTO: 95.7 FL (ref 80–99)
MONOCYTES # BLD: 0.47 K/UL (ref 0–1)
MONOCYTES NFR BLD: 11.2 % (ref 5–13)
NEUTS SEG # BLD: 2.91 K/UL (ref 1.8–8)
NEUTS SEG NFR BLD: 69.3 % (ref 32–75)
NRBC # BLD: 0 K/UL (ref 0–0.01)
NRBC BLD-RTO: 0 PER 100 WBC
PLATELET # BLD AUTO: 273 K/UL (ref 150–400)
PMV BLD AUTO: 9.5 FL (ref 8.9–12.9)
POTASSIUM SERPL-SCNC: 4.1 MMOL/L (ref 3.5–5.1)
PROT SERPL-MCNC: 7 G/DL (ref 6.4–8.2)
RBC # BLD AUTO: 3.75 M/UL (ref 3.8–5.2)
RBC MORPH BLD: ABNORMAL
SERVICE CMNT-IMP: NORMAL
SODIUM SERPL-SCNC: 137 MMOL/L (ref 136–145)
TROPONIN I SERPL HS-MCNC: 12 NG/L (ref 0–51)
WBC # BLD AUTO: 4.2 K/UL (ref 3.6–11)

## 2025-04-19 PROCEDURE — 85025 COMPLETE CBC W/AUTO DIFF WBC: CPT

## 2025-04-19 PROCEDURE — 36415 COLL VENOUS BLD VENIPUNCTURE: CPT

## 2025-04-19 PROCEDURE — 71045 X-RAY EXAM CHEST 1 VIEW: CPT

## 2025-04-19 PROCEDURE — 80053 COMPREHEN METABOLIC PANEL: CPT

## 2025-04-19 PROCEDURE — 99285 EMERGENCY DEPT VISIT HI MDM: CPT

## 2025-04-19 PROCEDURE — 93005 ELECTROCARDIOGRAM TRACING: CPT | Performed by: EMERGENCY MEDICINE

## 2025-04-19 PROCEDURE — 82962 GLUCOSE BLOOD TEST: CPT

## 2025-04-19 PROCEDURE — 70450 CT HEAD/BRAIN W/O DYE: CPT

## 2025-04-19 PROCEDURE — 84484 ASSAY OF TROPONIN QUANT: CPT

## 2025-04-19 ASSESSMENT — LIFESTYLE VARIABLES
HOW OFTEN DO YOU HAVE A DRINK CONTAINING ALCOHOL: NEVER
HOW MANY STANDARD DRINKS CONTAINING ALCOHOL DO YOU HAVE ON A TYPICAL DAY: PATIENT DOES NOT DRINK

## 2025-04-19 ASSESSMENT — PAIN SCALES - GENERAL: PAINLEVEL_OUTOF10: 0

## 2025-04-19 NOTE — ED PROVIDER NOTES
[facility] EMERGENCY DEPARTMENT  EMERGENCY DEPARTMENT ENCOUNTER       Pt Name: Brooklynn Mathews  MRN: [id number]  Birthdate 1940  Date of evaluation: 4/19/2025  Provider: Charles Ly MD   PCP: Isac Otoole MD  Note Started: 3:11 PM EDT 4/19/25  Attending Physician: Dr. Mills    CHIEF COMPLAINT       Chief Complaint   Patient presents with    Hypoglycemia     Pt BIBEMS from home after family reported pt was unresponsive. BGL initially was 51, pt received 100 of D10 and is now alert. Pt has dementia at baseline        HISTORY OF PRESENT ILLNESS: 1 or more elements      History From: EMS, patient, son, daughter, History limited by: Patient's dementia      Brooklynn Mathews is a 84 y.o. female history to include vascular dementia, brittle diabetes on insulin, hypertension, prior CVA, prior metabolic encephalopathy with recent ED visits and admission for hypoglycemia presenting for episode of unresponsiveness found to be hypoglycemic.  EMS report they were called for the patient being unresponsive.  Patient noted to have a blood glucose of 51 and was treated with 100 cc of D10.  Patient returned to baseline mental status after treatment.  Patient denying any current concerns or pain.  Specifically denying fever, headache, vision changes, back pain, chest pain, abdominal pain, neurodeficit.  Spoke to one of patient's sons as well as patient's daughter, who patient lives with, who reported baseline mental status is alert and oriented to person and place only.  Report patient had similar hypoglycemic episode several days ago which resolved without intervention.  During those episodes patient was noted to be altered and \"twitching\" without obvious seizure-like activity.  Denies any recent infectious symptoms or other concerns.  Patient received her basal insulin last night and prandial insulin around 930 when she had breakfast.  Patient was later noted to be somewhat unresponsive and foaming at the mouth after

## 2025-04-21 ENCOUNTER — HOSPITAL ENCOUNTER (EMERGENCY)
Facility: HOSPITAL | Age: 85
Discharge: HOME | End: 2025-04-21
Payer: MEDICARE

## 2025-04-21 VITALS
SYSTOLIC BLOOD PRESSURE: 175 MMHG | WEIGHT: 135 LBS | HEART RATE: 71 BPM | BODY MASS INDEX: 22.47 KG/M2 | TEMPERATURE: 98.1 F | RESPIRATION RATE: 16 BRPM | DIASTOLIC BLOOD PRESSURE: 132 MMHG | OXYGEN SATURATION: 98 %

## 2025-04-21 DIAGNOSIS — E16.2 HYPOGLYCEMIA: Primary | ICD-10-CM

## 2025-04-21 LAB
ALBUMIN SERPL-MCNC: 2.8 G/DL (ref 3.5–5)
ALBUMIN/GLOB SERPL: 0.6 (ref 1.1–2.2)
ALP SERPL-CCNC: 94 U/L (ref 45–117)
ALT SERPL-CCNC: 17 U/L (ref 12–78)
ANION GAP SERPL CALC-SCNC: 7 MMOL/L (ref 2–12)
AST SERPL-CCNC: 28 U/L (ref 15–37)
BASOPHILS # BLD: 0.02 K/UL (ref 0–0.1)
BASOPHILS NFR BLD: 0.4 % (ref 0–1)
BILIRUB SERPL-MCNC: 0.4 MG/DL (ref 0.2–1)
BUN SERPL-MCNC: 14 MG/DL (ref 6–20)
BUN/CREAT SERPL: 22 (ref 12–20)
CALCIUM SERPL-MCNC: 8.6 MG/DL (ref 8.5–10.1)
CHLORIDE SERPL-SCNC: 108 MMOL/L (ref 97–108)
CO2 SERPL-SCNC: 23 MMOL/L (ref 21–32)
CREAT SERPL-MCNC: 0.63 MG/DL (ref 0.55–1.02)
DIFFERENTIAL METHOD BLD: ABNORMAL
EOSINOPHIL # BLD: 0.1 K/UL (ref 0–0.4)
EOSINOPHIL NFR BLD: 2.2 % (ref 0–7)
ERYTHROCYTE [DISTWIDTH] IN BLOOD BY AUTOMATED COUNT: 15 % (ref 11.5–14.5)
GLOBULIN SER CALC-MCNC: 4.5 G/DL (ref 2–4)
GLUCOSE BLD STRIP.AUTO-MCNC: 71 MG/DL (ref 65–117)
GLUCOSE SERPL-MCNC: 63 MG/DL (ref 65–100)
HCT VFR BLD AUTO: 35.2 % (ref 35–47)
HGB BLD-MCNC: 11.5 G/DL (ref 11.5–16)
IMM GRANULOCYTES # BLD AUTO: 0.02 K/UL (ref 0–0.04)
IMM GRANULOCYTES NFR BLD AUTO: 0.4 % (ref 0–0.5)
LYMPHOCYTES # BLD: 0.9 K/UL (ref 0.8–3.5)
LYMPHOCYTES NFR BLD: 20.1 % (ref 12–49)
MCH RBC QN AUTO: 31.5 PG (ref 26–34)
MCHC RBC AUTO-ENTMCNC: 32.7 G/DL (ref 30–36.5)
MCV RBC AUTO: 96.4 FL (ref 80–99)
MONOCYTES # BLD: 0.52 K/UL (ref 0–1)
MONOCYTES NFR BLD: 11.6 % (ref 5–13)
NEUTS SEG # BLD: 2.91 K/UL (ref 1.8–8)
NEUTS SEG NFR BLD: 65.3 % (ref 32–75)
NRBC # BLD: 0 K/UL (ref 0–0.01)
NRBC BLD-RTO: 0 PER 100 WBC
PLATELET # BLD AUTO: 240 K/UL (ref 150–400)
PMV BLD AUTO: 9.5 FL (ref 8.9–12.9)
POTASSIUM SERPL-SCNC: 4.1 MMOL/L (ref 3.5–5.1)
PROT SERPL-MCNC: 7.3 G/DL (ref 6.4–8.2)
RBC # BLD AUTO: 3.65 M/UL (ref 3.8–5.2)
SERVICE CMNT-IMP: NORMAL
SODIUM SERPL-SCNC: 138 MMOL/L (ref 136–145)
WBC # BLD AUTO: 4.5 K/UL (ref 3.6–11)

## 2025-04-21 PROCEDURE — 85025 COMPLETE CBC W/AUTO DIFF WBC: CPT

## 2025-04-21 PROCEDURE — 80053 COMPREHEN METABOLIC PANEL: CPT

## 2025-04-21 PROCEDURE — 99283 EMERGENCY DEPT VISIT LOW MDM: CPT

## 2025-04-21 PROCEDURE — 36415 COLL VENOUS BLD VENIPUNCTURE: CPT

## 2025-04-21 PROCEDURE — 82962 GLUCOSE BLOOD TEST: CPT

## 2025-04-21 ASSESSMENT — LIFESTYLE VARIABLES
HOW MANY STANDARD DRINKS CONTAINING ALCOHOL DO YOU HAVE ON A TYPICAL DAY: PATIENT DOES NOT DRINK
HOW OFTEN DO YOU HAVE A DRINK CONTAINING ALCOHOL: NEVER

## 2025-04-21 ASSESSMENT — PAIN - FUNCTIONAL ASSESSMENT: PAIN_FUNCTIONAL_ASSESSMENT: NONE - DENIES PAIN

## 2025-04-21 NOTE — ED NOTES
Provided microwave meal and gingerale for BG of 71 Pt is alert to self,speaking in clear sentences VSS NAD

## 2025-04-21 NOTE — DISCHARGE INSTRUCTIONS
Thank You!    It was a pleasure taking care of you in our Emergency Department today. We know that when you come to our Emergency Department, you are entrusting us with your health, comfort, and safety. Our physicians and nurses honor that trust, and truly appreciate the opportunity to care for you and your loved ones.      We also value your feedback. If you receive a survey about your Emergency Department experience today, please fill it out.  We care about our patients' feedback, and we listen to what you have to say.  Thank you.    Deandre Castellano MD  ________________________________________________________________________  I have included a copy of your lab results and/or radiologic studies from today's visit so you can have them easily available at your follow-up visit. We hope you feel better and please do not hesitate to contact the ED if you have any questions at all!    Recent Results (from the past 12 hours)   POCT Glucose    Collection Time: 04/21/25  1:47 PM   Result Value Ref Range    POC Glucose 71 65 - 117 mg/dL    Performed by: Jan Braswell    CBC with Auto Differential    Collection Time: 04/21/25  2:03 PM   Result Value Ref Range    WBC 4.5 3.6 - 11.0 K/uL    RBC 3.65 (L) 3.80 - 5.20 M/uL    Hemoglobin 11.5 11.5 - 16.0 g/dL    Hematocrit 35.2 35.0 - 47.0 %    MCV 96.4 80.0 - 99.0 FL    MCH 31.5 26.0 - 34.0 PG    MCHC 32.7 30.0 - 36.5 g/dL    RDW 15.0 (H) 11.5 - 14.5 %    Platelets 240 150 - 400 K/uL    MPV 9.5 8.9 - 12.9 FL    Nucleated RBCs 0.0 0  WBC    nRBC 0.00 0.00 - 0.01 K/uL    Neutrophils % 65.3 32.0 - 75.0 %    Lymphocytes % 20.1 12.0 - 49.0 %    Monocytes % 11.6 5.0 - 13.0 %    Eosinophils % 2.2 0.0 - 7.0 %    Basophils % 0.4 0.0 - 1.0 %    Immature Granulocytes % 0.4 0.0 - 0.5 %    Neutrophils Absolute 2.91 1.80 - 8.00 K/UL    Lymphocytes Absolute 0.90 0.80 - 3.50 K/UL    Monocytes Absolute 0.52 0.00 - 1.00 K/UL    Eosinophils Absolute 0.10 0.00 - 0.40 K/UL    Basophils Absolute

## 2025-04-21 NOTE — ED TRIAGE NOTES
BIBEMS for hypoglycemia. Pt is alert to self, VSS, ND speaking in clear sentences BG 71 on arrival

## 2025-04-22 NOTE — PROGRESS NOTES
Chief Complaint   Patient presents with    Follow-Up from Hospital     Patient states she is present for a hospital follow up.    Have you been to the ER, urgent care clinic since your last visit?  Hospitalized since your last visit?   YES - When: approximately 2  weeks ago.  Where and Why: [facility] hypoglycemia .    Have you seen or consulted any other health care providers outside our system since your last visit?   NO      “Have you had a diabetic eye exam?”    NO     Date of last diabetic eye exam: 5/8/2015           
   No Known Problems Mother        OBJECTIVE:    BP (!) 118/59 (BP Site: Right Upper Arm, Patient Position: Sitting, BP Cuff Size: Large Adult)   Pulse 64   Temp 97.8 °F (36.6 °C) (Oral)   Resp 16   Ht 1.651 m (5' 5\")   Wt 62 kg (136 lb 9.6 oz)   SpO2 100%   BMI 22.73 kg/m²   CONSTITUTIONAL: well , well nourished, appears age appropriate  EYES: perrla, eom intact  ENMT:moist mucous membranes, pharynx clear  NECK: supple. Thyroid normal  RESPIRATORY: Chest: clear to ascultation and percussion   CARDIOVASCULAR: Heart: regular rate and rhythm  GASTROINTESTINAL: Abdomen: soft, bowel sounds active  HEMATOLOGIC: no pathological lymph nodes palpated  MUSCULOSKELETAL: Extremities: no edema, pulse 1+   INTEGUMENT: No unusual rashes or suspicious skin lesions noted. Nails appear normal.  NEUROLOGIC: non-focal exam   MENTAL STATUS: alert and oriented, appropriate affect      ASSESSMENT:  1. Follow-up exam       Assessment & Plan  1. Diabetes Mellitus.  - Discharge insulin regimen: Tresiba 15 units with correction 14 units and 5 units of NovoLog insulin AC.  - Admitting dose: 13 units of Tresiba and correction 15 units of Tresiba and 3 units of NovoLog AC.  - No changes made due to variable caloric intake causing hypoglycemia.  - Suggested taking prandial insulin PC rather than AC to ensure adequate caloric intake.    2. Hypertension.  - No longer taking losartan/HCTZ or amlodipine.  - Blood pressure is acceptable today.  - Decision to not resume losartan/HCTZ or amlodipine.    3. Hyperlipidemia.  - Continuing pravastatin for increased cardiovascular risk.    4. Hypothyroidism.  - History of hypothyroidism.  - Thyroid supplement remains unchanged.    5. Allergies.  - History of allergies and asthma.  - Advised to continue taking Singulair daily.    6.  Progressive dementia  - Patient has progressive dementia which is getting worse.  It appears that the recurring episodes of hypoglycemia which she was free for the last

## 2025-05-05 NOTE — ED PROVIDER NOTES
[facility] EMERGENCY DEPARTMENT  EMERGENCY DEPARTMENT ENCOUNTER    Patient Name: Brooklynn Mathews  MRN: [id number]  YOB: 1940  Provider: Deandre Castellano MD  PCP: Isac Otoole MD  Time/Date of evaluation:  4/21/25    History of Presenting Illness     Chief Complaint   Patient presents with    Hypoglycemia     BIBEMS for hypoglycemia, EMS arrived and BG was 44 recvd D10 blood sugar 111. PMH Dementia, alert to self only. PMH- HTN, DM       HISTORY (Narrative):   Brooklynn Mathews is a 84 y.o. female presents to the Emergency Department after being found unresponsive at home with a blood sugar of 44. Upon arrival, the patient was given D10, increasing her blood sugar to 111. Family suspects she may have taken too much insulin, leading to the hypoglycemic episode. The patient is now alert and oriented enough to know her name but appears confused about the reason for her visit. She denies any pain when asked and is currently sipping ginger ale as instructed by the medical team.    Current and Past Medications and Supplements  - Insulin    Review of Systems  - General: Initially unresponsive, now alert but confused  - Neurological: Oriented to name only  - Pain: Denies any pain  - Endocrine: History of insulin-dependent diabetes, presented with hypoglycemia    Medical History  - Diabetes mellitus, requiring insulin therapy  - Hypertension      Nursing Notes were all reviewed and agreed with or any disagreements were addressed in the HPI.    Past History     PAST MEDICAL HISTORY:  Past Medical History:   Diagnosis Date    Diabetes (HCC)     Heart failure (HCC)     unknown to family    HLD (hyperlipidemia)     Hypertension     Hypothyroidism     Stroke (HCC)        PAST SURGICAL HISTORY:  Past Surgical History:   Procedure Laterality Date    GYN      HEENT      thyroidectomy    HYSTERECTOMY (CERVIX STATUS UNKNOWN)      REMOVAL GALLBLADDER      THYROIDECTOMY         FAMILY HISTORY:  Family History

## 2025-05-08 RX ORDER — PEN NEEDLE, DIABETIC 32GX 5/32"
NEEDLE, DISPOSABLE MISCELLANEOUS
Qty: 100 EACH | Refills: 4 | Status: SHIPPED | OUTPATIENT
Start: 2025-05-08

## 2025-06-30 DIAGNOSIS — I10 ESSENTIAL (PRIMARY) HYPERTENSION: ICD-10-CM

## 2025-06-30 DIAGNOSIS — E03.2 HYPOTHYROIDISM DUE TO MEDICAMENTS AND OTHER EXOGENOUS SUBSTANCES: ICD-10-CM

## 2025-07-01 RX ORDER — AMLODIPINE BESYLATE 10 MG/1
10 TABLET ORAL DAILY
Qty: 90 TABLET | Refills: 3 | Status: SHIPPED | OUTPATIENT
Start: 2025-07-01

## 2025-07-01 RX ORDER — INSULIN ASPART 100 [IU]/ML
5 INJECTION, SOLUTION INTRAVENOUS; SUBCUTANEOUS
Qty: 2 ADJUSTABLE DOSE PRE-FILLED PEN SYRINGE | Refills: 11 | Status: SHIPPED | OUTPATIENT
Start: 2025-07-01

## 2025-07-01 RX ORDER — INSULIN DEGLUDEC 100 U/ML
13 INJECTION, SOLUTION SUBCUTANEOUS NIGHTLY
Qty: 6 ADJUSTABLE DOSE PRE-FILLED PEN SYRINGE | Refills: 5 | Status: CANCELLED | OUTPATIENT
Start: 2025-07-01

## 2025-07-01 RX ORDER — METOPROLOL SUCCINATE 25 MG/1
25 TABLET, EXTENDED RELEASE ORAL DAILY
Qty: 90 TABLET | Refills: 3 | Status: SHIPPED | OUTPATIENT
Start: 2025-07-01

## 2025-07-01 RX ORDER — LEVOTHYROXINE SODIUM 175 UG/1
175 TABLET ORAL DAILY
Qty: 90 TABLET | Refills: 3 | Status: SHIPPED | OUTPATIENT
Start: 2025-07-01

## 2025-07-18 PROBLEM — F03.C0 ADVANCED DEMENTIA (HCC): Status: ACTIVE | Noted: 2025-01-01

## 2025-07-18 PROBLEM — E11.10 DKA (DIABETIC KETOACIDOSIS) (HCC): Status: RESOLVED | Noted: 2017-06-02 | Resolved: 2025-01-01

## 2025-07-18 PROBLEM — J18.9 MULTIFOCAL PNEUMONIA: Status: ACTIVE | Noted: 2025-01-01

## 2025-07-18 NOTE — ED NOTES
TRANSFER - OUT REPORT:    Verbal report given to AMANDA Izquierdo on Brooklynn Mathews  being transferred to [facility] 3219 for routine progression of patient care       Report consisted of patient's Situation, Background, Assessment and   Recommendations(SBAR).     Information from the following report(s) Nurse Handoff Report, Index, ED Encounter Summary, ED SBAR, Adult Overview, Surgery Report, Intake/Output, MAR, Recent Results, Med Rec Status, and Cardiac Rhythm NSR was reviewed with the receiving nurse.    [place] Fall Assessment:    Presents to emergency department  because of falls (Syncope, seizure, or loss of consciousness): No  Age > 70: Yes  Altered Mental Status, Intoxication with alcohol or substance confusion (Disorientation, impaired judgment, poor safety awaremess, or inability to follow instructions): Yes  Impaired Mobility: Ambulates or transfers with assistive devices or assistance; Unable to ambulate or transer.: Yes  Nursing Judgement: Yes        Lines:   Peripheral IV 07/18/25 Distal;Left;Anterior Forearm (Active)   Site Assessment Clean, dry & intact 07/18/25 1539   Line Status Blood return noted;Flushed;Normal saline locked 07/18/25 1539   Line Care Line pulled back 07/18/25 1539   Phlebitis Assessment No symptoms 07/18/25 1539   Infiltration Assessment 0 07/18/25 1539   Dressing Status Clean, dry & intact 07/18/25 1539   Dressing Intervention New 07/18/25 1539      Opportunity for questions and clarification was provided.      Patient transported with:  Monitor

## 2025-07-18 NOTE — ED NOTES
This RN notified ED Charge RN Abimbola regarding calling report to Med Tele att. Charge RN to call floor.

## 2025-07-18 NOTE — H&P
Plan: YEISON FULL DUAL ADVANTAGE 2 / Product Type: *No Product type* /   No active isolations No active infections        Subjective    Past Medical History:   Diagnosis Date    Diabetes (HCC)     DKA (diabetic ketoacidosis) (HCC) 06/02/2017    Heart failure (HCC)     unknown to family    HLD (hyperlipidemia)     Hypertension     Hypothyroidism     Stroke (HCC)      *problem list added below, as it can be disruptive to patient's outpatient providers documentation to transfer all of the other diagnosis manually. Instead, there are diagnosis of importance listed here that may also be a part of patient's PMHx. CareEverywhere also reviewed and I manually pull over OSH diagnosis and add to the PMHx.   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)    Dehydration    Advanced dementia (HCC)    Multifocal pneumonia      Past Surgical History:   Procedure Laterality Date    GYN      HEENT      thyroidectomy    HYSTERECTOMY (CERVIX STATUS UNKNOWN)      REMOVAL GALLBLADDER      THYROIDECTOMY         Social History     Tobacco Use    Smoking status: Never     Passive exposure: Never    Smokeless tobacco: Never   Substance Use Topics    Alcohol use: No      Family History   Problem Relation Age of Onset    Diabetes Father     No Known Problems Mother       Relevant ROS: unremarkable unless previously stated        Objective Data (use drop down arrow to review data /physical exam)   Objective     Patient Vitals for the past 24 hrs:   BP Temp Pulse Resp SpO2 Height Weight   07/18/25 1759 (!) 141/67 99.3 °F (37.4 °C) 86 22 -- -- --   07/18/25 1730 126/78 --

## 2025-07-19 NOTE — PLAN OF CARE
Problem: Chronic Conditions and Co-morbidities  Goal: Patient's chronic conditions and co-morbidity symptoms are monitored and maintained or improved  Outcome: Progressing  Flowsheets (Taken 7/19/2025 0851)  Care Plan - Patient's Chronic Conditions and Co-Morbidity Symptoms are Monitored and Maintained or Improved: Monitor and assess patient's chronic conditions and comorbid symptoms for stability, deterioration, or improvement     Problem: Discharge Planning  Goal: Discharge to home or other facility with appropriate resources  Outcome: Progressing  Flowsheets (Taken 7/19/2025 0851)  Discharge to home or other facility with appropriate resources:   Identify barriers to discharge with patient and caregiver   Arrange for needed discharge resources and transportation as appropriate     Problem: Safety - Adult  Goal: Free from fall injury  Outcome: Progressing     Problem: Pain  Goal: Verbalizes/displays adequate comfort level or baseline comfort level  Outcome: Progressing     Problem: Neurosensory - Adult  Goal: Achieves stable or improved neurological status  Outcome: Progressing  Goal: Achieves maximal functionality and self care  Outcome: Progressing     Problem: Genitourinary - Adult  Goal: Absence of urinary retention  Outcome: Progressing     Problem: Metabolic/Fluid and Electrolytes - Adult  Goal: Electrolytes maintained within normal limits  Outcome: Progressing  Goal: Hemodynamic stability and optimal renal function maintained  Outcome: Progressing  Goal: Glucose maintained within prescribed range  Outcome: Progressing     Problem: Skin/Tissue Integrity  Goal: Skin integrity remains intact  Description: 1.  Monitor for areas of redness and/or skin breakdown  2.  Assess vascular access sites hourly  3.  Every 4-6 hours minimum:  Change oxygen saturation probe site  4.  Every 4-6 hours:  If on nasal continuous positive airway pressure, respiratory therapy assess nares and determine need for appliance change

## 2025-07-19 NOTE — PLAN OF CARE
Problem: Chronic Conditions and Co-morbidities  Goal: Patient's chronic conditions and co-morbidity symptoms are monitored and maintained or improved  Outcome: Progressing  Flowsheets (Taken 7/18/2025 1941)  Care Plan - Patient's Chronic Conditions and Co-Morbidity Symptoms are Monitored and Maintained or Improved: Monitor and assess patient's chronic conditions and comorbid symptoms for stability, deterioration, or improvement     Problem: Discharge Planning  Goal: Discharge to home or other facility with appropriate resources  Outcome: Progressing  Flowsheets (Taken 7/18/2025 1941)  Discharge to home or other facility with appropriate resources:   Identify barriers to discharge with patient and caregiver   Arrange for needed discharge resources and transportation as appropriate     Problem: Safety - Adult  Goal: Free from fall injury  Outcome: Progressing     Problem: Pain  Goal: Verbalizes/displays adequate comfort level or baseline comfort level  Outcome: Progressing     Problem: Neurosensory - Adult  Goal: Achieves stable or improved neurological status  Outcome: Progressing  Goal: Achieves maximal functionality and self care  Outcome: Progressing     Problem: Genitourinary - Adult  Goal: Absence of urinary retention  Outcome: Progressing     Problem: Metabolic/Fluid and Electrolytes - Adult  Goal: Electrolytes maintained within normal limits  Outcome: Progressing  Goal: Hemodynamic stability and optimal renal function maintained  Outcome: Progressing  Goal: Glucose maintained within prescribed range  Outcome: Progressing

## 2025-07-20 NOTE — PLAN OF CARE
Problem: Chronic Conditions and Co-morbidities  Goal: Patient's chronic conditions and co-morbidity symptoms are monitored and maintained or improved  7/19/2025 2223 by Darell Mart RN  Outcome: Progressing  Flowsheets (Taken 7/19/2025 2055)  Care Plan - Patient's Chronic Conditions and Co-Morbidity Symptoms are Monitored and Maintained or Improved:   Monitor and assess patient's chronic conditions and comorbid symptoms for stability, deterioration, or improvement   Collaborate with multidisciplinary team to address chronic and comorbid conditions and prevent exacerbation or deterioration   Update acute care plan with appropriate goals if chronic or comorbid symptoms are exacerbated and prevent overall improvement and discharge  7/19/2025 1352 by Isa Hernandez RN  Outcome: Progressing  Flowsheets (Taken 7/19/2025 0851)  Care Plan - Patient's Chronic Conditions and Co-Morbidity Symptoms are Monitored and Maintained or Improved: Monitor and assess patient's chronic conditions and comorbid symptoms for stability, deterioration, or improvement     Problem: Discharge Planning  Goal: Discharge to home or other facility with appropriate resources  7/19/2025 2223 by Darell Mart RN  Outcome: Progressing  7/19/2025 1352 by Isa Hernandez RN  Outcome: Progressing  Flowsheets (Taken 7/19/2025 0851)  Discharge to home or other facility with appropriate resources:   Identify barriers to discharge with patient and caregiver   Arrange for needed discharge resources and transportation as appropriate     Problem: Safety - Adult  Goal: Free from fall injury  7/19/2025 2223 by Darell Mart RN  Outcome: Progressing  7/19/2025 1352 by Isa Hernandez RN  Outcome: Progressing     Problem: Pain  Goal: Verbalizes/displays adequate comfort level or baseline comfort level  7/19/2025 2223 by Darell Mart RN  Outcome: Progressing  7/19/2025 1352 by Isa Hernandez RN  Outcome: Progressing

## 2025-07-20 NOTE — ED PROVIDER NOTES
[facility] 3 MED TELE  EMERGENCY DEPARTMENT ENCOUNTER       Pt Name: Brooklynn Mathews  MRN: [id number]  Birthdate 1940  Date of evaluation: 7/18/2025  Provider: Zeke Mello MD   PCP: Isac Otoole MD  Note Started: 4:42 PM 7/20/25     CHIEF COMPLAINT       Chief Complaint   Patient presents with    Altered Mental Status     Pt BIBEMS from home with a chief complaint of AMS since 11am today. Per EMS, pts family said pt has hx dementia but pt is acting outside of her baseline. Bg 43 per EMS PTA and EMS gave D10 with a new BG at 253. Pt arrives with urinary incontinence. Pt is A&O x1 to self upon arrival.         HISTORY OF PRESENT ILLNESS: 1 or more elements      History From: EMS and patient's daughter  Dementia     Brooklynn Mathews is a 84 y.o. female who presents to the ED with increased confusion which began around 11am. Patient does have dementia at baseline and is unable to contribute to hx. EMS reports patient had BG of 43 on their arrival. Patient was given IV D10 with improvement in blood glucose.      Nursing Notes were all reviewed and agreed with or any disagreements were addressed in the HPI.     REVIEW OF SYSTEMS      Review of Systems     Positives and Pertinent negatives as per HPI.    PAST HISTORY     Past Medical History:  Past Medical History:   Diagnosis Date    Diabetes (HCC)     DKA (diabetic ketoacidosis) (HCC) 06/02/2017    Heart failure (HCC)     unknown to family    HLD (hyperlipidemia)     Hypertension     Hypothyroidism     Stroke (HCC)          Past Surgical History:  Past Surgical History:   Procedure Laterality Date    GYN      HEENT      thyroidectomy    HYSTERECTOMY (CERVIX STATUS UNKNOWN)      REMOVAL GALLBLADDER      THYROIDECTOMY         Family History:  Family History   Problem Relation Age of Onset    Diabetes Father     No Known Problems Mother        Social History:  Social History     Tobacco Use    Smoking status: Never     Passive exposure: Never    Smokeless tobacco:

## 2025-07-20 NOTE — PLAN OF CARE
Problem: Chronic Conditions and Co-morbidities  Goal: Patient's chronic conditions and co-morbidity symptoms are monitored and maintained or improved  Outcome: Progressing  Flowsheets (Taken 7/20/2025 0930)  Care Plan - Patient's Chronic Conditions and Co-Morbidity Symptoms are Monitored and Maintained or Improved: Monitor and assess patient's chronic conditions and comorbid symptoms for stability, deterioration, or improvement     Problem: Discharge Planning  Goal: Discharge to home or other facility with appropriate resources  Outcome: Progressing  Flowsheets (Taken 7/20/2025 0930)  Discharge to home or other facility with appropriate resources: Identify barriers to discharge with patient and caregiver     Problem: Safety - Adult  Goal: Free from fall injury  Outcome: Progressing     Problem: Pain  Goal: Verbalizes/displays adequate comfort level or baseline comfort level  Outcome: Progressing     Problem: Neurosensory - Adult  Goal: Achieves stable or improved neurological status  Outcome: Progressing  Goal: Achieves maximal functionality and self care  Outcome: Progressing     Problem: Genitourinary - Adult  Goal: Absence of urinary retention  Outcome: Progressing     Problem: Metabolic/Fluid and Electrolytes - Adult  Goal: Electrolytes maintained within normal limits  Outcome: Progressing  Goal: Hemodynamic stability and optimal renal function maintained  Outcome: Progressing  Goal: Glucose maintained within prescribed range  Outcome: Progressing     Problem: Skin/Tissue Integrity  Goal: Skin integrity remains intact  Description: 1.  Monitor for areas of redness and/or skin breakdown  2.  Assess vascular access sites hourly  3.  Every 4-6 hours minimum:  Change oxygen saturation probe site  4.  Every 4-6 hours:  If on nasal continuous positive airway pressure, respiratory therapy assess nares and determine need for appliance change or resting period  Outcome: Progressing     Problem: Confusion  Goal:

## 2025-07-21 NOTE — DISCHARGE INSTRUCTIONS
Patient Discharge Instructions    Brooklynn Mathews / [id number] : 1940    Admitted 2025 Discharged: 2025         DISCHARGE DIAGNOSIS:   Multifocal right-sided community-acquired pneumonia  Acute metabolic encephalopathy due to community-acquired pneumonia with  Acute metabolic encephalopathy due to pneumonia  Dementia  Diabetes mellitus type 2  Hypertension  Depression  Hypothyroidism  Dyslipidemia    Complete antibiotics with cefdinir and azithromycin, prescription sent to the pharmacy    Note change in the NPH insulin dosing 6 units in the morning and 3 units at bedtime, less NPH at bedtime to prevent a blood sugar drop in the morning    Check blood sugars before meals and bedtime, notify Dr. Otoole if blood sugar less than 70 or greater than 350      General drug facts     If you have a very bad allergy, wear an allergy ID at all times.   It is important that you take the medication exactly as they are prescribed.   Keep your medication in the bottles provided by the pharmacist.  Keep a list of all your drugs (prescription, natural products, vitamins, OTC) with you. Give this list to your doctor.  Do not take other medications without consulting your doctor.    Do not share your drugs with others and do not take anyone else's drugs.   Keep all drugs out of the reach of children and pets.    Most drugs may be thrown away in household trash after mixing with coffee grounds or norma litter and sealing in a plastic bag.    Keep a list Call your doctor for help with any side effects. If in the [place] you may also call the FDA at 6-866-FDA-3213    Talk with the doctor before starting any new drug, including OTC, natural products, or vitamins.        What to do at Home    1. Recommended diet: easy to chew     2. Recommended activity: activity as tolerated    3. If you experience any of the following symptoms then please call your primary care physician or return to the emergency room if you cannot get

## 2025-07-21 NOTE — CARE COORDINATION
Care Management Initial Assessment       RUR: 11%  Readmission? No  1st IM letter given? Yes   1st  letter given: No     Chart reviewed. Call placed to daughter (Catherine) to complete assessment. Verified contact information and demographics. Pt lives with daughter in a one level home with 5 AMBER. At baseline, pt can ambulate very short distances with a cane. Her grand-daughter is her paid Medicaid caregiver (40 hrs/week) and provides assistance with ADLs/IADLs. Daughter confirms pt is never alone and has 24 hr assistance. Daughter works from home. [facility] [address] No prior hx of SNF/rehab noted. Sees PCP every 6 months. Plan is for pt to return home with family upon discharge. 2nd IM verbally reviewed and copy sent to daughter via e-mail  ([email or web address]) pt will need medical transport at time of discharge. Will continue to follow.       07/21/25 1600   Service Assessment   Patient Orientation Alert and Oriented;Other (see comment)  (A&O x 0)   Cognition Dementia / Early Alzheimer's   History Provided By Child/Family;Medical Record   Primary Caregiver Family   Support Systems Children;Family Members   Patient's Healthcare Decision Maker is: Legal Next of Kin   PCP Verified by CM Yes  (Isac Otoole)   Last Visit to PCP Within last 6 months   Prior Functional Level Assistance with the following:;Bathing;Dressing;Toileting;Cooking;Housework;Shopping;Mobility   Current Functional Level Assistance with the following:;Bathing;Dressing;Toileting;Cooking;Housework;Shopping;Mobility   Can patient return to prior living arrangement Yes   Ability to make needs known: Fair   Family able to assist with home care needs: Yes   Would you like for me to discuss the discharge plan with any other family members/significant others, and if so, who? Yes  (Catherine (Daughter))   Financial Resources Medicare;Medicaid   Social/Functional History   Lives With Family;Daughter   Type of

## 2025-07-21 NOTE — PLAN OF CARE
Problem: Chronic Conditions and Co-morbidities  Goal: Patient's chronic conditions and co-morbidity symptoms are monitored and maintained or improved  Outcome: Progressing  Flowsheets (Taken 7/21/2025 0824)  Care Plan - Patient's Chronic Conditions and Co-Morbidity Symptoms are Monitored and Maintained or Improved: Monitor and assess patient's chronic conditions and comorbid symptoms for stability, deterioration, or improvement     Problem: Discharge Planning  Goal: Discharge to home or other facility with appropriate resources  Outcome: Progressing  Flowsheets (Taken 7/21/2025 0824)  Discharge to home or other facility with appropriate resources: Identify barriers to discharge with patient and caregiver     Problem: Safety - Adult  Goal: Free from fall injury  Outcome: Progressing     Problem: Pain  Goal: Verbalizes/displays adequate comfort level or baseline comfort level  Outcome: Progressing     Problem: Neurosensory - Adult  Goal: Achieves stable or improved neurological status  Outcome: Progressing  Goal: Achieves maximal functionality and self care  Outcome: Progressing     Problem: Genitourinary - Adult  Goal: Absence of urinary retention  Outcome: Progressing     Problem: Metabolic/Fluid and Electrolytes - Adult  Goal: Electrolytes maintained within normal limits  Outcome: Progressing  Goal: Hemodynamic stability and optimal renal function maintained  Outcome: Progressing  Goal: Glucose maintained within prescribed range  Outcome: Progressing     Problem: Skin/Tissue Integrity  Goal: Skin integrity remains intact  Description: 1.  Monitor for areas of redness and/or skin breakdown  2.  Assess vascular access sites hourly  3.  Every 4-6 hours minimum:  Change oxygen saturation probe site  4.  Every 4-6 hours:  If on nasal continuous positive airway pressure, respiratory therapy assess nares and determine need for appliance change or resting period  Outcome: Progressing     Problem: Confusion  Goal:

## 2025-07-21 NOTE — PLAN OF CARE
Problem: Chronic Conditions and Co-morbidities  Goal: Patient's chronic conditions and co-morbidity symptoms are monitored and maintained or improved  7/21/2025 0227 by Darell Mart RN  Outcome: Progressing  7/20/2025 1830 by Isa Hernandez RN  Outcome: Progressing  Flowsheets (Taken 7/20/2025 0930)  Care Plan - Patient's Chronic Conditions and Co-Morbidity Symptoms are Monitored and Maintained or Improved: Monitor and assess patient's chronic conditions and comorbid symptoms for stability, deterioration, or improvement     Problem: Discharge Planning  Goal: Discharge to home or other facility with appropriate resources  7/21/2025 0227 by Darell Mart RN  Outcome: Progressing  7/20/2025 1830 by Isa Hernandez RN  Outcome: Progressing  Flowsheets (Taken 7/20/2025 0930)  Discharge to home or other facility with appropriate resources: Identify barriers to discharge with patient and caregiver     Problem: Safety - Adult  Goal: Free from fall injury  7/21/2025 0227 by Darell Mart RN  Outcome: Progressing  7/20/2025 1830 by Isa Hernandez RN  Outcome: Progressing     Problem: Pain  Goal: Verbalizes/displays adequate comfort level or baseline comfort level  7/21/2025 0227 by Darell Mart RN  Outcome: Progressing  7/20/2025 1830 by Isa Hernandez RN  Outcome: Progressing     Problem: Neurosensory - Adult  Goal: Achieves stable or improved neurological status  7/21/2025 0227 by Darell Mart RN  Outcome: Progressing  7/20/2025 1830 by Isa Hernandez RN  Outcome: Progressing  Goal: Achieves maximal functionality and self care  7/21/2025 0227 by Darell Mart RN  Outcome: Progressing  7/20/2025 1830 by Isa Hernandez RN  Outcome: Progressing     Problem: Genitourinary - Adult  Goal: Absence of urinary retention  7/21/2025 0227 by Darell Mart RN  Outcome: Progressing  7/20/2025 1830 by Isa Hernandez RN  Outcome: Progressing

## 2025-07-22 NOTE — PLAN OF CARE
Problem: Chronic Conditions and Co-morbidities  Goal: Patient's chronic conditions and co-morbidity symptoms are monitored and maintained or improved  7/21/2025 2326 by Stacie Cai RN  Outcome: Progressing  7/21/2025 1722 by Isa Hernandez RN  Outcome: Progressing  Flowsheets (Taken 7/21/2025 0824)  Care Plan - Patient's Chronic Conditions and Co-Morbidity Symptoms are Monitored and Maintained or Improved: Monitor and assess patient's chronic conditions and comorbid symptoms for stability, deterioration, or improvement     Problem: Discharge Planning  Goal: Discharge to home or other facility with appropriate resources  7/21/2025 2326 by Stacie Cai RN  Outcome: Progressing  7/21/2025 1722 by Isa Hernandez RN  Outcome: Progressing  Flowsheets (Taken 7/21/2025 0824)  Discharge to home or other facility with appropriate resources: Identify barriers to discharge with patient and caregiver     Problem: Safety - Adult  Goal: Free from fall injury  7/21/2025 2326 by Stacie Cai RN  Outcome: Progressing  7/21/2025 1722 by Isa Hernandez RN  Outcome: Progressing     Problem: Pain  Goal: Verbalizes/displays adequate comfort level or baseline comfort level  7/21/2025 2326 by Stacie Cai RN  Outcome: Progressing  7/21/2025 1722 by Isa Hernandez RN  Outcome: Progressing     Problem: Neurosensory - Adult  Goal: Achieves stable or improved neurological status  7/21/2025 2326 by Stacie Cai RN  Outcome: Progressing  7/21/2025 1722 by Isa Hernandez RN  Outcome: Progressing  Goal: Achieves maximal functionality and self care  7/21/2025 2326 by Stacie Cai RN  Outcome: Progressing  7/21/2025 1722 by Isa Hernandez RN  Outcome: Progressing     Problem: Genitourinary - Adult  Goal: Absence of urinary retention  7/21/2025 2326 by Stacie Cai RN  Outcome: Progressing  7/21/2025 1722 by Isa Hernandez RN  Outcome: Progressing     Problem:

## 2025-07-23 NOTE — PLAN OF CARE
Problem: Chronic Conditions and Co-morbidities  Goal: Patient's chronic conditions and co-morbidity symptoms are monitored and maintained or improved  7/23/2025 1110 by Chandler Quezada RN  Outcome: Progressing  7/23/2025 0006 by Rasheeda Lobo LPN  Outcome: Progressing     Problem: Discharge Planning  Goal: Discharge to home or other facility with appropriate resources  7/23/2025 1110 by Chandler Quezada RN  Outcome: Progressing  7/23/2025 0006 by Rasheeda Lobo LPN  Outcome: Progressing     Problem: Discharge Planning  Goal: Discharge to home or other facility with appropriate resources  7/23/2025 1110 by Chandler Quezada RN  Outcome: Progressing  7/23/2025 0006 by Rasheeda Lobo LPN  Outcome: Progressing     Problem: Safety - Adult  Goal: Free from fall injury  7/23/2025 1110 by Chandler Quezada RN  Outcome: Progressing  7/23/2025 0006 by Rasheeda Lobo LPN  Outcome: Progressing     Problem: Pain  Goal: Verbalizes/displays adequate comfort level or baseline comfort level  7/23/2025 1110 by Chandler Quezada RN  Outcome: Progressing  7/23/2025 0006 by Rasheeda Lobo LPN  Outcome: Progressing     Problem: Genitourinary - Adult  Goal: Absence of urinary retention  7/23/2025 1110 by Chandler Quezada RN  Outcome: Progressing  7/23/2025 0006 by Rasheeda Lobo LPN  Outcome: Progressing

## 2025-07-24 NOTE — PLAN OF CARE
Problem: Chronic Conditions and Co-morbidities  Goal: Patient's chronic conditions and co-morbidity symptoms are monitored and maintained or improved  7/23/2025 2152 by Norma Kennedy RN  Outcome: Progressing  7/23/2025 1840 by Chandler Quezada RN  Outcome: Progressing  7/23/2025 1110 by Chandler Quezada RN  Outcome: Progressing     Problem: Discharge Planning  Goal: Discharge to home or other facility with appropriate resources  7/23/2025 2152 by Norma Kennedy RN  Outcome: Progressing  7/23/2025 1840 by Chandler Quezada RN  Outcome: Progressing  7/23/2025 1110 by Chandler Quezada RN  Outcome: Progressing     Problem: Safety - Adult  Goal: Free from fall injury  7/23/2025 2152 by Norma Kennedy RN  Outcome: Progressing  7/23/2025 1840 by Chandler Quezada RN  Outcome: Progressing  7/23/2025 1110 by Chandler Quezada RN  Outcome: Progressing     Problem: Pain  Goal: Verbalizes/displays adequate comfort level or baseline comfort level  7/23/2025 2152 by Norma Kennedy RN  Outcome: Progressing  7/23/2025 1840 by Chandler Quezada RN  Outcome: Progressing  7/23/2025 1110 by Chandler Quezada RN  Outcome: Progressing     Problem: Neurosensory - Adult  Goal: Achieves stable or improved neurological status  7/23/2025 2152 by Norma Kennedy RN  Outcome: Progressing  7/23/2025 1840 by Chandler Quezada RN  Outcome: Progressing  7/23/2025 1110 by Chandler Quezada RN  Outcome: Progressing  Goal: Achieves maximal functionality and self care  7/23/2025 2152 by Norma Kennedy RN  Outcome: Progressing  7/23/2025 1840 by Chandler Quezada RN  Outcome: Progressing  7/23/2025 1110 by Chandler Quezada RN  Outcome: Progressing     Problem: Genitourinary - Adult  Goal: Absence of urinary retention  7/23/2025 2152 by Norma Kennedy RN  Outcome: Progressing  7/23/2025 1840 by Chandler Quezada RN  Outcome: Progressing  7/23/2025 1110 by Chandler Quezada, RN  Outcome: Progressing     Problem: Metabolic/Fluid and Electrolytes - Adult  Goal:

## 2025-07-25 NOTE — DIABETES MGMT
[facility]  DIABETES MANAGEMENT CONSULT    Consulted by Provider for advanced nursing evaluation and care for inpatient blood glucose management.    Evaluation and Action Plan   Brooklynn Mathews is a 84 y.o. female with a hx of Type 2 Diabetes. The patient was admitted after experiencing AMS and hypoglycemia. Bg was reportedly 43 per EMS. The patient is has had previous admission with variable BG's. PMHx DKA, HF, HLD, HTN, Hypothyroidism, CVA and Dementia.     Hx of DM  and noted to be taking Tresiba 13 units nightly and Novolog 5 units with each meal. Her grand-daughter is her paid Medicaid caregiver (40 hrs/week) and provides assistance with ADLs/IADLs. Daughter confirms pt is never alone and has 24 hr assistance. At baseline, pt can ambulate very short distances with a cane       BG's are variable and difficult to regulate. Lispro should be given cautiously as the patient is prone to hypoglycemia. She will require basal insulin , but the dose will likely need basal insulin but with very slow titration as needed. Diabetes management will monitor Bg trends and adjust as needed.     7/23 Hypoglycemia in the early morning. Basal dose held this morning. I will resume at a lower dose. Switching to NPH may help prevent hypoglycemia.     7/24 Bg's remain variable , but hypoglycemia resolved. In anticipation I recommend using NPH 4 units q 12 hours and reducing Novolog to 2 units with each meal. She should continue CGM use and follow-up with her PCP  for future diabetes management.   Blood glucose pattern              Diabetes Discharge Plan   Medication  Use NPH 4 units Q12 hours  Use Novolog 2 units with each meal  Monitor Bg's   Follow-up with PCP for future diabetes management   Referral  [x]        Outpatient diabetes education- referral sent   Additional orders       Past Medical History     Past Medical History:   Diagnosis Date    Diabetes (HCC)     DKA (diabetic ketoacidosis) ([facility]) 
[facility]  DIABETES MANAGEMENT CONSULT    Consulted by Provider for advanced nursing evaluation and care for inpatient blood glucose management.    Evaluation and Action Plan   Brooklynn Mathews is a 84 y.o. female with a hx of Type 2 Diabetes. The patient was admitted after experiencing AMS and hypoglycemia. Bg was reportedly 43 per EMS. The patient is has had previous admission with variable BG's. PMHx DKA, HF, HLD, HTN, Hypothyroidism, CVA and Dementia.     Hx of DM  and noted to be taking Tresiba 13 units nightly and Novolog 5 units with each meal. Her grand-daughter is her paid Medicaid caregiver (40 hrs/week) and provides assistance with ADLs/IADLs. Daughter confirms pt is never alone and has 24 hr assistance. At baseline, pt can ambulate very short distances with a cane       BG's are variable and difficult to regulate. Lispro should be given cautiously as the patient is prone to hypoglycemia. She will require basal insulin , but the dose will likely need basal insulin but with very slow titration as needed. Diabetes management will monitor Bg trends and adjust as needed.   Blood glucose pattern          Management Rationale Action Plan   Medication   Basal needs Using 0.2 units/kg/D based on weight  10 units Lantus daily   Nutritional needs Covers carb load in meals    Corrective insulin Using medium dose sensitivity based on weight    Additional orders  Carb consistent diet (60g CHO/meal)       Diabetes Discharge Plan   Medication  Will likely require reduction in insulin dosing for discharge as Bg's are very variable.    Referral  []        Outpatient diabetes education   Additional orders       Past Medical History     Past Medical History:   Diagnosis Date    Diabetes (HCC)     DKA (diabetic ketoacidosis) ([facility]) 06/02/2017    Heart failure (HCC)     unknown to family    HLD (hyperlipidemia)     Hypertension     Hypothyroidism     Stroke (HCC)        Hospital Course   Clinical 
Outpatient diabetes education- referral sent   Additional orders       Past Medical History     Past Medical History:   Diagnosis Date    Diabetes (HCC)     DKA (diabetic ketoacidosis) (HCC) 06/02/2017    Heart failure (HCC)     unknown to family    HLD (hyperlipidemia)     Hypertension     Hypothyroidism     Stroke ([facility])        Hospital Course   Clinical progress has been uncomplicated    Diabetes History   Type Diabetes- Type 1  Ambulatory BG management provided by: PCP      Lab Results   Component Value Date    LABA1C 10.2 (H) 07/20/2025    LABA1C 8.8 (H) 04/03/2025    LABA1C 9.2 (H) 02/03/2025              Diabetes Medication History  Diabetes drug class Antihyperglycemic Agent and Dosing Additional Comments   Insulin Tresiba  Novolog      Diabetes self-management practices:   Eating pattern Deferred   [] Eating a carbohydrate-controlled meal plan     Physical activity pattern Deferred   [] Employing a physical activity program to control BG    Monitoring pattern    [x] Testing BGs sufficiently to inform self-management adjustments         Taking medications pattern   [x] Consistent administration  [x] Affordable      Overall evaluation:    [x] Not achieving A1c target with drug therapy & self-care practices    Subjective   Resting      Objective   Physical exam  General  Thin  female. cooperative  Neuro  Resting   Vital Signs   Vitals:    07/25/25 1103   BP: (!) 150/106   Pulse: 72   Resp:    Temp:    SpO2:        Recent Labs     07/23/25  0524 07/25/25  0537   WBC 3.7 3.2*   HGB 12.7 12.8   HCT 38.3 38.6   MCV 91.8 93.5    234     Recent Labs     07/23/25  0524 07/25/25  0537    135*   K 3.4* 3.9    101   CO2 28 29   BUN 11 16   CREATININE 0.62 0.72     Lab Results   Component Value Date    ALT 17 07/20/2025    AST 23 07/20/2025    ALKPHOS 68 07/20/2025    BILITOT 0.6 07/20/2025     Lab Results   Component Value Date    TSH 0.43 07/18/2025         Factors impacting BG management  Factor Dose 
  Kidney function Normal    Liver function Normal      Billing Code(s)   [] 73179 IP initial hospital care - 40 minutes   [] 65975 IP initial hospital care -55 minutes  [] 84150 IP initial hospital care -75 minutes  []        73551 IP subsequent hospital care - 50 minutes   [] 39962 IP subsequent hospital care - 35 minutes   [x] 30769 IP subsequent hospital care - 25 minutes       Before making these care recommendations, I personally reviewed the hospitalization record, including notes, laboratory & diagnostic data and current medications, and examined the patient at the bedside.  Total minutes: 25 minutes    BRAULIO Leal - CNS   Diabetes Clinical Nurse Specialist   [facility]  Access via [employer]

## 2025-07-25 NOTE — PLAN OF CARE
Problem: Chronic Conditions and Co-morbidities  Goal: Patient's chronic conditions and co-morbidity symptoms are monitored and maintained or improved  7/24/2025 2122 by Norma Kennedy RN  Outcome: Progressing  7/24/2025 1257 by Jo, Yeong Ae Jenny, RN  Outcome: Progressing     Problem: Discharge Planning  Goal: Discharge to home or other facility with appropriate resources  7/24/2025 2122 by Norma Kennedy RN  Outcome: Progressing  7/24/2025 1257 by Jo, Yeong Ae Jenny, RN  Outcome: Progressing     Problem: Safety - Adult  Goal: Free from fall injury  7/24/2025 2122 by Norma Kennedy RN  Outcome: Progressing  7/24/2025 1257 by Jo, Yeong Ae Jenny, RN  Outcome: Progressing     Problem: Pain  Goal: Verbalizes/displays adequate comfort level or baseline comfort level  7/24/2025 2122 by Norma Kennedy RN  Outcome: Progressing  7/24/2025 1257 by Jo, Yeong Ae Jenny, RN  Outcome: Progressing     Problem: Neurosensory - Adult  Goal: Achieves stable or improved neurological status  7/24/2025 2122 by Norma Kennedy RN  Outcome: Progressing  7/24/2025 1257 by Jo, Yeong Ae Jenny, RN  Outcome: Progressing  Goal: Achieves maximal functionality and self care  7/24/2025 2122 by Norma Kennedy RN  Outcome: Progressing  7/24/2025 1257 by Jo, Yeong Ae Jenny, RN  Outcome: Progressing     Problem: Genitourinary - Adult  Goal: Absence of urinary retention  7/24/2025 2122 by Norma Kennedy RN  Outcome: Progressing  7/24/2025 1257 by Jo, Yeong Ae Jenny, RN  Outcome: Progressing     Problem: Metabolic/Fluid and Electrolytes - Adult  Goal: Electrolytes maintained within normal limits  7/24/2025 2122 by Norma Kennedy RN  Outcome: Progressing  7/24/2025 1257 by Jo, Yeong Ae Jenny, RN  Outcome: Progressing  Goal: Hemodynamic stability and optimal renal function maintained  7/24/2025 2122 by Norma Kennedy RN  Outcome: Progressing  7/24/2025 1257 by Jo, Yeong Ae Jenny, RN  Outcome: Progressing  Goal: Glucose maintained within

## 2025-07-26 NOTE — PLAN OF CARE
Problem: Chronic Conditions and Co-morbidities  Goal: Patient's chronic conditions and co-morbidity symptoms are monitored and maintained or improved  7/26/2025 0232 by Darell Mart RN  Outcome: Progressing  7/25/2025 1832 by Dejah Soria LPN  Outcome: Progressing     Problem: Discharge Planning  Goal: Discharge to home or other facility with appropriate resources  7/26/2025 0232 by Darell Mart RN  Outcome: Progressing  7/25/2025 1832 by Dejah Soria LPN  Outcome: Progressing     Problem: Safety - Adult  Goal: Free from fall injury  7/26/2025 0232 by Darell Mart RN  Outcome: Progressing  7/25/2025 1832 by Dejah Soria LPN  Outcome: Progressing     Problem: Pain  Goal: Verbalizes/displays adequate comfort level or baseline comfort level  7/26/2025 0232 by Darell Mart RN  Outcome: Progressing  7/25/2025 1832 by Dejah Soria LPN  Outcome: Progressing     Problem: Neurosensory - Adult  Goal: Achieves stable or improved neurological status  7/26/2025 0232 by Darell Mart RN  Outcome: Progressing  7/25/2025 1832 by Dejah Soria LPN  Outcome: Progressing  Goal: Achieves maximal functionality and self care  7/26/2025 0232 by Darell Mart RN  Outcome: Progressing  7/25/2025 1832 by Dejah Soria LPN  Outcome: Progressing     Problem: Genitourinary - Adult  Goal: Absence of urinary retention  7/26/2025 0232 by Darell Mart RN  Outcome: Progressing  7/25/2025 1832 by Dejah Soria LPN  Outcome: Progressing     Problem: Metabolic/Fluid and Electrolytes - Adult  Goal: Electrolytes maintained within normal limits  7/26/2025 0232 by Darell Mart RN  Outcome: Progressing  7/25/2025 1832 by Dejah Soria LPN  Outcome: Progressing  Goal: Hemodynamic stability and optimal renal function maintained  7/26/2025 0232 by Darell Mart RN  Outcome: Progressing  7/25/2025 1832 by Estella, Dejah, LPN  Outcome: Progressing  Goal: Glucose maintained

## 2025-07-27 NOTE — PLAN OF CARE
Problem: Chronic Conditions and Co-morbidities  Goal: Patient's chronic conditions and co-morbidity symptoms are monitored and maintained or improved  Outcome: Progressing     Problem: Discharge Planning  Goal: Discharge to home or other facility with appropriate resources  Outcome: Progressing     Problem: Safety - Adult  Goal: Free from fall injury  Outcome: Progressing     Problem: Pain  Goal: Verbalizes/displays adequate comfort level or baseline comfort level  7/26/2025 2056 by Keaton Deleon RN  Outcome: Progressing  7/26/2025 0753 by Rebecca Pittman LPN  Outcome: Progressing     Problem: Neurosensory - Adult  Goal: Achieves stable or improved neurological status  Outcome: Progressing  Goal: Achieves maximal functionality and self care  Outcome: Progressing     Problem: Genitourinary - Adult  Goal: Absence of urinary retention  Outcome: Progressing     Problem: Metabolic/Fluid and Electrolytes - Adult  Goal: Electrolytes maintained within normal limits  Outcome: Progressing  Goal: Hemodynamic stability and optimal renal function maintained  Outcome: Progressing  Goal: Glucose maintained within prescribed range  Outcome: Progressing     Problem: Skin/Tissue Integrity  Goal: Skin integrity remains intact  Description: 1.  Monitor for areas of redness and/or skin breakdown  2.  Assess vascular access sites hourly  3.  Every 4-6 hours minimum:  Change oxygen saturation probe site  4.  Every 4-6 hours:  If on nasal continuous positive airway pressure, respiratory therapy assess nares and determine need for appliance change or resting period  Outcome: Progressing     Problem: Confusion  Goal: Confusion, delirium, dementia, or psychosis is improved or at baseline  Description: INTERVENTIONS:  1. Assess for possible contributors to thought disturbance, including medications, impaired vision or hearing, underlying metabolic abnormalities, dehydration, psychiatric diagnoses, and notify attending LIP  2. [place]

## 2025-07-28 NOTE — PLAN OF CARE
Problem: Chronic Conditions and Co-morbidities  Goal: Patient's chronic conditions and co-morbidity symptoms are monitored and maintained or improved  7/28/2025 1703 by Jerrica Villar RN  Outcome: Adequate for Discharge  7/28/2025 0807 by Jerrica Villar RN  Outcome: Progressing     Problem: Discharge Planning  Goal: Discharge to home or other facility with appropriate resources  7/28/2025 1703 by Jerrica Villar RN  Outcome: Adequate for Discharge  7/28/2025 0807 by Jerrica Villar RN  Outcome: Progressing     Problem: Safety - Adult  Goal: Free from fall injury  7/28/2025 1703 by Jerrica Villar RN  Outcome: Adequate for Discharge  7/28/2025 0807 by Jerrica Villar RN  Outcome: Progressing     Problem: Pain  Goal: Verbalizes/displays adequate comfort level or baseline comfort level  7/28/2025 1703 by Jerrica Villar RN  Outcome: Adequate for Discharge  7/28/2025 0807 by Jerrica Villar RN  Outcome: Progressing     Problem: Neurosensory - Adult  Goal: Achieves stable or improved neurological status  7/28/2025 1703 by Jerrica Villar RN  Outcome: Adequate for Discharge  7/28/2025 0807 by Jerrica Villar RN  Outcome: Progressing  Goal: Achieves maximal functionality and self care  7/28/2025 1703 by Jerrica Villar RN  Outcome: Adequate for Discharge  7/28/2025 0807 by Jerrica Villar RN  Outcome: Progressing     Problem: Genitourinary - Adult  Goal: Absence of urinary retention  7/28/2025 1703 by Jerrica Villar RN  Outcome: Adequate for Discharge  7/28/2025 0807 by Jerrica Villar RN  Outcome: Progressing     Problem: Metabolic/Fluid and Electrolytes - Adult  Goal: Electrolytes maintained within normal limits  7/28/2025 1703 by Jerrica Villar RN  Outcome: Adequate for Discharge  7/28/2025 0807 by Jerrica Villar RN  Outcome: Progressing  Goal: Hemodynamic stability and optimal renal function maintained  7/28/2025 1703 by Jerrica Villar RN  Outcome: Adequate for Discharge  7/28/2025 0807 by Jerrica Villar

## 2025-07-28 NOTE — CARE COORDINATION
Cleared for D/C from CM standpoint  Transition of Care Plan:    RUR: 12%  Prior Level of Functioning: needs assistance  Disposition: home with family/caregiver support  RHINA: 7/28  Follow up appointments: PCP, specialists as indicated   DME needed: owns DME required for discharge   Transportation at discharge: [facility] stretcher, reference#[id number]  IM/IMM Medicare/ letter given: given   Is patient a [place] and connected with [facility]? no   If yes, was  transfer form completed and [facility] notified?   Caregiver Contact: Catherine Shen (daughter)  Discharge Caregiver contacted prior to discharge? yes  Care Conference needed? no  Barriers to discharge:  none    Chart reviewed. CM aware of discharge order. Contacted daughter, Monica, to notify of d/c today and to confirm family is home to receive the patient. Confirmed discharge address. CM secured stretcher transport w/ [facility] ([phone], reference#[id number]) ETA within 3 hrs. 2nd IM reviewed with daughter and copy sent via e-mail. Pt will return home with family and 24/7 support. No further CM needs identified at this time.       07/28/25 1541   Services At/After Discharge   Transition of Care Consult (CM Consult) Discharge Planning   Services At/After Discharge Transport    Resource Information Provided? No   Mode of Transport at Discharge BLS  ([employer] stretcher)   Hospital Transport Time of Discharge 1700   Confirm Follow Up Transport Family   Condition of Participation: Discharge Planning   The Plan for Transition of Care is related to the following treatment goals: return to baseline   The Patient and/or Patient Representative was provided with a Choice of Provider? Patient Representative   Name of the Patient Representative who was provided with the Choice of Provider and agrees with the Discharge Plan?  Catherine Shen (daughter)   The Patient and/Or Patient Representative agree with the Discharge Plan? Yes   Freedom of Choice list was provided with basic

## 2025-07-28 NOTE — PLAN OF CARE
Problem: Chronic Conditions and Co-morbidities  Goal: Patient's chronic conditions and co-morbidity symptoms are monitored and maintained or improved  7/27/2025 2233 by Keaton Deleon RN  Outcome: Progressing  7/27/2025 1235 by Riddhi Reid RN  Outcome: Progressing  Flowsheets (Taken 7/27/2025 0901)  Care Plan - Patient's Chronic Conditions and Co-Morbidity Symptoms are Monitored and Maintained or Improved:   Monitor and assess patient's chronic conditions and comorbid symptoms for stability, deterioration, or improvement   Collaborate with multidisciplinary team to address chronic and comorbid conditions and prevent exacerbation or deterioration   Update acute care plan with appropriate goals if chronic or comorbid symptoms are exacerbated and prevent overall improvement and discharge     Problem: Discharge Planning  Goal: Discharge to home or other facility with appropriate resources  7/27/2025 2233 by Keaton Deleon RN  Outcome: Progressing  7/27/2025 1235 by Riddhi Reid RN  Outcome: Progressing  Flowsheets (Taken 7/27/2025 0901)  Discharge to home or other facility with appropriate resources:   Identify barriers to discharge with patient and caregiver   Arrange for needed discharge resources and transportation as appropriate   Identify discharge learning needs (meds, wound care, etc)     Problem: Safety - Adult  Goal: Free from fall injury  7/27/2025 2233 by Keaton Deleon RN  Outcome: Progressing  7/27/2025 1235 by Riddhi Reid RN  Outcome: Progressing  Flowsheets (Taken 7/27/2025 0901)  Free From Fall Injury: Instruct family/caregiver on patient safety     Problem: Pain  Goal: Verbalizes/displays adequate comfort level or baseline comfort level  7/27/2025 2233 by Keaton Deleon, RN  Outcome: Progressing  7/27/2025 1235 by Riddhi Reid RN  Outcome: Progressing     Problem: Neurosensory - Adult  Goal: Achieves stable or improved neurological status  7/27/2025 2233 by

## 2025-07-28 NOTE — PROGRESS NOTES
Hospitalist Progress Note    NAME:   Brooklynn Mathews   : 1940   MRN: [id number]     Admit date: 2025    Discharge date: 25    PCP: Isac Otoole MD    Discharge Diagnoses:    Discharge Medications:  Current Discharge Medication List        START taking these medications    Details   insulin NPH (HUMULIN N KWIKPEN) 100 UNIT/ML injection pen 6 units sub-q shot in AM with breakfast and 3 units at bedside  Qty: 5 Adjustable Dose Pre-filled Pen Syringe, Refills: 3    Associated Diagnoses: Hyperglycemia due to type 1 diabetes mellitus (HCC)           CONTINUE these medications which have NOT CHANGED    Details   losartan-hydroCHLOROthiazide (HYZAAR) 100-25 MG per tablet Take 1 tablet by mouth daily      levothyroxine (SYNTHROID) 175 MCG tablet TAKE 1 TABLET BY MOUTH EVERY DAY  Qty: 90 tablet, Refills: 3    Associated Diagnoses: 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 Continuous Glucose Sensor (FREESTYLE TANYA 2 SENSOR) MISC Blood sugar checks before meals and at bedtime and as needed  Qty: 2 each, Refills: 11      Continuous Glucose  (FREESTYLE TANYA 2 READER) MARY Blood sugar checks before meals and at bedtime and as needed  Qty: 1 each, Refills: 0    Associated Diagnoses: Hyperglycemia due to type 1 diabetes mellitus (HCC)      brimonidine (ALPHAGAN P) 0.15 % ophthalmic solution Place 2 drops into both eyes daily  Qty: 15 mL, Refills: 11      traZODone (DESYREL) 50 MG 
      Hospitalist Progress Note    NAME:   Brooklynn Mathews   : 1940   MRN: [id number]     Date/Time: 2025 8:44 PM  Patient PCP: Isac Otoole MD    Estimated discharge date:  Barriers:       Assessment / Plan:    Multifocal right-sided community-acquired pneumonia  Acute metabolic encephalopathy due to community-acquired pneumonia with  -continue ceftriaxone and Zithromax.  Check labs in a.m.    Acute metabolic encephalopathy due to pneumonia  Dementia  - Continue supportive care    Diabetes mellitus type 2  - Long-acting insulin being held due to hypoglycemia.  Continue sliding scale insulin with blood sugar checks only    Hypertension  Depression  Hypothyroidism  Dyslipidemia  - Continue PTA losartan, hydrochlorothiazide, Seroquel, levothyroxine and also statin      Medical Decision Making:   I personally reviewed labs: CBC, BMP  I personally reviewed imaging: Chest x-ray  I personally reviewed EKG: Telemetry  Toxic drug monitoring:   Discussed case with: Pharmacy        Code Status: DNR  DVT Prophylaxis: Lovenox  GI Prophylaxis:    Subjective:     Chief Complaint / Reason for Physician Visit  No fever  Overnight events noted      Objective:     VITALS:   Last 24hrs VS reviewed since prior progress note. Most recent are:  Patient Vitals for the past 24 hrs:   BP Temp Temp src Pulse Resp SpO2 Weight   25 136/89 -- -- -- -- -- --   25 (!) 142/73 98.2 °F (36.8 °C) -- 79 -- 100 % --   25 0930 (!) 131/41 98.2 °F (36.8 °C) Oral 61 16 100 % --   25 0600 -- -- -- -- -- -- 57.4 kg (126 lb 8 oz)   25 (!) 105/90 98.2 °F (36.8 °C) Axillary 76 18 96 % --         Intake/Output Summary (Last 24 hours) at 2025  Last data filed at 2025 1840  Gross per 24 hour   Intake 587.15 ml   Output 550 ml   Net 37.15 ml        I had a face to face encounter and independently examined this patient on 2025, as outlined below:  PHYSICAL EXAM:  General: Alert, 
      Hospitalist Progress Note    NAME:   Brooklynn Mathews   : 1940   MRN: [id number]     Date/Time: 2025 3:29 PM  Patient PCP: Isac Otoole MD    Estimated discharge date:  Barriers:       Assessment / Plan:    Multifocal right-sided community-acquired pneumonia  Acute metabolic encephalopathy due to community-acquired pneumonia with  -continue ceftriaxone and Zithromax.  Does not report any shortness of breath    Acute metabolic encephalopathy due to pneumonia  Dementia  - Continue supportive care.  She is currently alert awake oriented x 0    Diabetes mellitus type 2  - Hemoglobin A1c 11.2  - Did not have any episodes of hypoglycemia during hospitalization currently blood sugars are more than 400  - Will resume her home insulin degludec 30 units daily and also insulin lispro 5 units 3 times daily and continue sliding scale  - Will discharge if her blood sugars are less than 250 may be today or tomorrow    Hypertension  Depression  Hypothyroidism  Dyslipidemia  - Continue PTA losartan, hydrochlorothiazide, Seroquel, levothyroxine and also statin      Medical Decision Making:   I personally reviewed labs: CBC, BMP  I personally reviewed imaging: Chest x-ray  I personally reviewed EKG: Telemetry  Toxic drug monitoring: Monitor blood sugar due to risk of hypoglycemia while on insulin  Discussed case with: Pharmacy        Code Status: DNR  DVT Prophylaxis: Lovenox  GI Prophylaxis:    Subjective:     Chief Complaint / Reason for Physician Visit  Alert, awake  Poor historian  Does not report any shortness of breath, cough or phlegm  Blood sugars are high this morning at more than 400      Objective:     VITALS:   Last 24hrs VS reviewed since prior progress note. Most recent are:  Patient Vitals for the past 24 hrs:   BP Temp Temp src Pulse Resp SpO2 Weight   25 0824 (!) 126/56 97.5 °F (36.4 °C) Oral 78 18 100 % --   25 0553 -- -- -- -- -- -- 57.9 kg (127 lb 11.2 oz)   25 136/89 -- 
      Hospitalist Progress Note    NAME:   Brooklynn Mathews   : 1940   MRN: [id number]     Date/Time: 2025 3:31 PM  Patient PCP: Isac Otoole MD    Estimated discharge date:  Barriers:       Assessment / Plan:    Multifocal right-sided community-acquired pneumonia  Acute metabolic encephalopathy due to community-acquired pneumonia with  -continue ceftriaxone and Zithromax.  Does not report any shortness of breath.  She has no fever or leukocytosis.    Acute metabolic encephalopathy due to pneumonia  Dementia  - Continue supportive care.  She is currently alert awake oriented x 0    Diabetes mellitus type 2  - Hemoglobin A1c 11.2  -She was taking insulin degludec 13 units and also insulin lispro 5 units 3 times daily before coming to the hospitalization and these were resumed when patient had hypoglycemic events during hospitalization  - Will restart Lantus at 10 units and hold insulin lispro.  Continue low-dose sliding scale  - Will consult diabetic team      Hypertension  Depression  Hypothyroidism  Dyslipidemia  - Continue PTA losartan, hydrochlorothiazide, Seroquel, levothyroxine and also statin      Medical Decision Making:   I personally reviewed labs: CBC, BMP  I personally reviewed imaging: Chest x-ray  I personally reviewed EKG: Telemetry  Toxic drug monitoring: Monitor blood sugar due to risk of hypoglycemia while on insulin  Discussed case with: Pharmacy        Code Status: DNR  DVT Prophylaxis: Lovenox  GI Prophylaxis:    Subjective:     Chief Complaint / Reason for Physician Visit  Alert, awake and confused  No overnight hypoglycemic events noted  Poor historian        Objective:     VITALS:   Last 24hrs VS reviewed since prior progress note. Most recent are:  Patient Vitals for the past 24 hrs:   BP Temp Temp src Pulse Resp SpO2   25 0801 104/71 97.5 °F (36.4 °C) Axillary 76 14 94 %   25 2132 (!) 148/71 98.1 °F (36.7 °C) Axillary 71 17 95 %   25 1932 (!) 134/56 98.2 °F 
      Hospitalist Progress Note    NAME:   Brooklynn Mathews   : 1940   MRN: [id number]     Date/Time: 2025 5:02 PM  Patient PCP: Isac Otoole MD    Estimated discharge date:  Barriers: hypoglycemia       Assessment / Plan:    Multifocal right-sided community-acquired pneumonia  Acute metabolic encephalopathy due to community-acquired pneumonia with  -continue ceftriaxone and Zithromax.  Does not report any shortness of breath.  She has no fever or leukocytosis.    Acute metabolic encephalopathy due to pneumonia  Dementia  - Continue supportive care.  She is currently alert awake oriented x 0    Diabetes mellitus type 2  - Hemoglobin A1c 11.2  -She was taking insulin degludec 13 units and also insulin lispro 5 units 3 times daily before coming to the hospitalization and these were resumed when patient had hypoglycemic events during hospitalization  - Will restart Lantus at 10 units and hold insulin lispro.  Continue low-dose sliding scale  - Will consult diabetic team  -developed hypoglycemia , DM team consulted       Hypertension  Depression  Hypothyroidism  Dyslipidemia  - Continue PTA losartan, hydrochlorothiazide, Seroquel, levothyroxine and also statin      Medical Decision Making:   I personally reviewed labs: CBC, BMP  I personally reviewed imaging: Chest x-ray  I personally reviewed EKG: Telemetry  Toxic drug monitoring: Monitor blood sugar due to risk of hypoglycemia while on insulin  Discussed case with: Pharmacy        Code Status: DNR  DVT Prophylaxis: Lovenox  GI Prophylaxis:    Subjective:     Chief Complaint / Reason for Physician Visit  Alert, awake and confused  No overnight hypoglycemic events noted  Poor historian        Objective:     VITALS:   Last 24hrs VS reviewed since prior progress note. Most recent are:  Patient Vitals for the past 24 hrs:   BP Temp Temp src Pulse Resp SpO2 Weight   25 1559 (!) 156/70 97.3 °F (36.3 °C) Oral 90 16 96 % --   25 0822 (!) 125/91 
      Hospitalist Progress Note    NAME:   Brooklynn Mathews   : 1940   MRN: [id number]     Date/Time: 2025 6:21 PM  Patient PCP: Isac Otoole MD    Assessment / Plan:    Multifocal right-sided community-acquired pneumonia POA  Acute metabolic encephalopathy POA   Dementia  Awake and talking, very pleasant, but not able to hold a conversation or answer orientation questions  Appears in no distress  Complete ceftriaxone and Zithromax.    Does not report any shortness of breath.    She has no fever or leukocytosis.  Continue supportive care.  She is currently alert awake oriented x 0  Procalcitonin < 0.05, no culture sent    Diabetes mellitus type 2 very labile on chronic insulin POA  Recurrent hypoglycemia during the hospitalization  to 42,  to   Hemoglobin A1c 11.2  Insulin degludec 13 units and insulin lispro 5 units 3 times daily before coming to the hospital  Seen by DTC, changed to NPH    NPH today, titrate up to 4 units every 12.   patient glucose was noted to be 54 in early AM  BS today increased to 402 overnight and 351 in AM   Increased NPH to 5 units twice a day on  and watch overnight   Blood sugar dropped to 66 this morning   Concerned about hypoglycemia since she is not really verbal, could not communicate symptom  Will adjust insulin 1 more day, reduce the bedtime dose to 3 units, increase a.m. dose to 6 units  If sugar remains stable tomorrow,   will plan discharge to home with home health  Hypertension  Depression  Hypothyroidism  Dyslipidemia  Continue PTA losartan, hydrochlorothiazide, Seroquel, levothyroxine and also statin    Code Status: DNR  DVT Prophylaxis: Lovenox    Medical Decision Making:   I personally reviewed labs: CBC, BMP  I personally reviewed imaging: Chest x-ray  I personally reviewed EKG: Telemetry  Toxic drug monitoring: Monitor blood sugar due to risk of hypoglycemia while on insulin  Discussed case with: Pharmacy    Subjective:     Chief 
      Hospitalist Progress Note    NAME:   Brooklynn Mathews   : 1940   MRN: [id number]     Date/Time: 2025 3:48 PM  Patient PCP: Isac Otoole MD    Assessment / Plan:    Multifocal right-sided community-acquired pneumonia  Acute metabolic encephalopathy due to community-acquired pneumonia with  -continue ceftriaxone and Zithromax.  Does not report any shortness of breath.  She has no fever or leukocytosis.    Acute metabolic encephalopathy due to pneumonia  Dementia  - Continue supportive care.  She is currently alert awake oriented x 0    Diabetes mellitus type 2  - Hemoglobin A1c 11.2  -She was taking insulin degludec 13 units and also insulin lispro 5 units 3 times daily before coming to the hospitalization and these were resumed when patient had hypoglycemic events during hospitalization  - Patient has intermittent hypoglycemia, has brittle diabetes.  Her BG remains variable.  Further adjustment of NPH today, titrate up to 4 units every 12.  If her blood glucose remains stable on this dose in the next 24 hours, will likely discharge on this regimen.      Hypertension  Depression  Hypothyroidism  Dyslipidemia  - Continue PTA losartan, hydrochlorothiazide, Seroquel, levothyroxine and also statin      Medical Decision Making:   I personally reviewed labs: CBC, BMP  I personally reviewed imaging: Chest x-ray  I personally reviewed EKG: Telemetry  Toxic drug monitoring: Monitor blood sugar due to risk of hypoglycemia while on insulin  Discussed case with: Pharmacy        Code Status: DNR  DVT Prophylaxis: Lovenox    Subjective:     Chief Complaint / Reason for Physician Visit  Patient is resting in bed, wakes up to voice.  Denies any new complaint patient is a poor historian.        Objective:     VITALS:   Last 24hrs VS reviewed since prior progress note. Most recent are:  Patient Vitals for the past 24 hrs:   BP Temp Temp src Pulse Resp SpO2 Weight   25 0831 (!) 145/54 98.2 °F (36.8 °C) 
      Hospitalist Progress Note    NAME:   Brooklynn Mathews   : 1940   MRN: [id number]     Date/Time: 2025 6:43 PM  Patient PCP: Isac Otoole MD    Assessment / Plan:    Multifocal right-sided community-acquired pneumonia  Acute metabolic encephalopathy due to community-acquired pneumonia with  -continue ceftriaxone and Zithromax.  Does not report any shortness of breath.  She has no fever or leukocytosis.    Acute metabolic encephalopathy due to pneumonia  Dementia  - Continue supportive care.  She is currently alert awake oriented x 0    Diabetes mellitus type 2  - Hemoglobin A1c 11.2  -She was taking insulin degludec 13 units and also insulin lispro 5 units 3 times daily before coming to the hospitalization and these were resumed when patient had hypoglycemic events during hospitalization  - Patient has intermittent hypoglycemia, has brittle diabetes.  Her BG remains variable.  Further adjustment of NPH today, titrate up to 4 units every .     patient glucose was noted to be 54 today morning, diabetic management to follow-up can be discharged tomorrow if blood sugar stable no hypoglycemic episode      Hypertension  Depression  Hypothyroidism  Dyslipidemia  - Continue PTA losartan, hydrochlorothiazide, Seroquel, levothyroxine and also statin      Medical Decision Making:   I personally reviewed labs: CBC, BMP  I personally reviewed imaging: Chest x-ray  I personally reviewed EKG: Telemetry  Toxic drug monitoring: Monitor blood sugar due to risk of hypoglycemia while on insulin  Discussed case with: Pharmacy        Code Status: DNR  DVT Prophylaxis: Lovenox    Subjective:     Chief Complaint / Reason for Physician Visit  Patient is resting in bed, wakes up to voice.  Denies any new complaint patient is a poor historian.        Objective:     VITALS:   Last 24hrs VS reviewed since prior progress note. Most recent are:  Patient Vitals for the past 24 hrs:   BP Temp Temp src Pulse Resp SpO2 
      Hospitalist Progress Note    NAME:   Brooklynn Mathews   : 1940   MRN: [id number]     Date/Time: 2025 9:34 AM  Patient PCP: Isac Otoole MD    Assessment / Plan:    Multifocal right-sided community-acquired pneumonia POA  Acute metabolic encephalopathy POA   Dementia  Awake and talking, very pleasant, but not able to hold a conversation or answer orientation questions  Appears in no distress  Complete ceftriaxone and Zithromax.    Does not report any shortness of breath.    She has no fever or leukocytosis.  Continue supportive care.  She is currently alert awake oriented x 0    Diabetes mellitus type 2 very labile on chronic insulin POA  Recurrent hypoglycemia during the hospitalization  to ,  to   Hemoglobin A1c 11.2  Insulin degludec 13 units and insulin lispro 5 units 3 times daily before coming to the hospital  Seen by DTC, Further adjustment of NPH today, titrate up to 4 units every 12.   patient glucose was noted to be 54 in early AM  DTC changed to BID NPH  BS today increased to 402 overnight and 351 in AM   Increase NPH to 5 units twice a day and watch overnight  If blood sugar reasonably controlled we will plan to discharge tomorrow    Hypertension  Depression  Hypothyroidism  Dyslipidemia  Continue PTA losartan, hydrochlorothiazide, Seroquel, levothyroxine and also statin    Code Status: DNR  DVT Prophylaxis: Lovenox    Medical Decision Making:   I personally reviewed labs: CBC, BMP  I personally reviewed imaging: Chest x-ray  I personally reviewed EKG: Telemetry  Toxic drug monitoring: Monitor blood sugar due to risk of hypoglycemia while on insulin  Discussed case with: Pharmacy    Subjective:     Chief Complaint / Reason for Physician Visit  Patient is resting in bed, wakes up to voice.  Denies any new complaint patient is a poor historian.  Patient talking but not really able to hold conversation  Not able to consistently answer questions appropriately  Blood 
..End of Shift Note    Bedside shift change report given to AMANDA Green (oncoming nurse) by Chandler Quezada RN (offgoing nurse).  Report included the following information SBAR    Shift worked:  0700 - 1900     Shift summary and any significant changes:    Pt. Tolerated all  medication w/o issue.  No c/o pain or distress. Hypoglycemic episode at morning turnover.  Vital stable during day shift.     Concerns for physician to address: No     Zone phone for oncoming shift:  No       Activity:  Level of Assistance: Moderate assist, patient does 50-74%  Number times ambulated in hallways past shift: 0  Number of times OOB to chair past shift: 0    Cardiac:   Cardiac Monitoring: Yes      Cardiac Rhythm: Sinus rhythm    Access:  Current line(s): PIV     Genitourinary:   Urinary Status: Voiding, Incontinent    Respiratory:   O2 Device: None (Room air)  Chronic home O2 use?: NO  Incentive spirometer at bedside: NO    GI:  Last BM (including prior to admit):  (pt does not remember)  Current diet:  ADULT DIET; Easy to Chew  Passing flatus: YES    Pain Management:   Patient states pain is manageable on current regimen: YES    Skin:  Lev Scale Score: 14  Interventions: Wound Offloading (Prevention Methods): Turning, Repositioning, Pillows    Patient Safety:  Fall Risk: Nursing Judgement-Fall Risk High(Add Comments): Yes  Fall Risk Interventions  Nursing Judgement-Fall Risk High(Add Comments): Yes  Toilet Every 2 Hours-In Advance of Need: Yes  Hourly Visual Checks: Awake, In bed, Confused  Fall Visual Posted: Fall sign posted, Socks, Armband  Room Door Open: Yes  Alarm On: Bed  Patient Moved Closer to Nursing Station: No    Active Consults:   IP CONSULT TO SOCIAL WORK  IP CONSULT TO CASE MANAGEMENT  IP CONSULT TO PHARMACY  IP CONSULT TO DIABETES MANAGEMENT    Length of Stay:  Expected LOS: 6  Actual LOS: 5    Chandler Quezada RN                            
Comprehensive Nutrition Assessment    Type and Reason for Visit:  Initial, LOS    Nutrition Recommendations/Plan:   Continue current diet  Encourage PO intakes, honor preferences as able, provide assistance PRN  Monitor and record PO intakes and Bms in I/Os     Malnutrition Assessment:  Malnutrition Status:  At risk for malnutrition (07/25/25 1443)    Context:  Acute Illness     Findings of the 6 clinical characteristics of malnutrition:  Energy Intake:  Mild decrease in energy intake  Weight Loss:  Greater than 5% over 1 month     Body Fat Loss:  No body fat loss     Muscle Mass Loss:  Unable to assess    Fluid Accumulation:  Unable to assess     Strength:  Not Performed    Nutrition Assessment:    Admitted for multifocal PNA. PMHx includes DM, HF, HLD, HTN, hypothyroidism, stroke. Screened for LOS. Pt with fair-good documented intakes, confirmed with nurse at bedside. Pt unable to participate in interview questions from melissa DEL CID. Did note recent significant weight loss per EMR wt hx (-8.5% x 1 month). Plan to continue to encourage PO intakes.    Nutrition Related Findings:    Labs: Na 135, K 297. Meds: hydrochlorothiazide, insulin lispro, insulin NPH, levothyroxine, pravastatin. No edema. BM 7/23. Wound Type: None       Current Nutrition Intake & Therapies:    Average Meal Intake: 51-75%  Average Supplements Intake: None Ordered  ADULT DIET; Easy to Chew    Anthropometric Measures:  Height: 165.1 cm (5' 5\")  Ideal Body Weight (IBW): 125 lbs (57 kg)    Current Body Weight: 56 kg (123 lb 7.3 oz), 98.8 % IBW. Weight Source: Bed scale  Current BMI (kg/m2): 20.5  BMI Categories: Underweight (BMI less than 22) age over 65    Estimated Daily Nutrient Needs:  Energy Requirements Based On: Kcal/kg  Weight Used for Energy Requirements: Current  Energy (kcal/day): 1770kcal (30kcal/kg)  Weight Used for Protein Requirements: Current  Protein (g/day): 71g (1.2g/kg)  Method Used for Fluid Requirements: 1 ml/kcal  Fluid 
Discharge instructions reviewed with patients kay Anderson over the phone  
End of Shift Note    Bedside shift change report given to AMANDA Emerson (oncoming nurse) by Norma Kennedy RN (offgoing nurse).  Report included the following information SBAR, Kardex, Intake/Output, MAR, Recent Results, and Quality Measures    Shift worked:  5352-2809     Shift summary and any significant changes:     Patient had uneventful night.Calm, follows commands, on room air,denies any pain and not in distress.Plan of care ongoing.     Concerns for physician to address:       Zone phone for oncoming shift:          Activity:  Level of Assistance: Moderate assist, patient does 50-74%  Number times ambulated in hallways past shift: 0  Number of times OOB to chair past shift: 0    Cardiac:   Cardiac Monitoring: No      Cardiac Rhythm: Sinus rhythm    Access:  Current line(s): PIV     Genitourinary:   Urinary Status: Voiding, External catheter, Diaper    Respiratory:   O2 Device: None (Room air)  Chronic home O2 use?: NO  Incentive spirometer at bedside: NO    GI:  Last BM (including prior to admit): 07/23/25  Current diet:  ADULT DIET; Easy to Chew  Passing flatus: YES    Pain Management:   Patient states pain is manageable on current regimen: YES    Skin:  Lev Scale Score: 19  Interventions: Wound Offloading (Prevention Methods): Turning    Patient Safety:  Fall Risk: Nursing Judgement-Fall Risk High(Add Comments): Yes  Fall Risk Interventions  Nursing Judgement-Fall Risk High(Add Comments): Yes  Toilet Every 2 Hours-In Advance of Need: Yes  Hourly Visual Checks: Eyes closed, In bed  Fall Visual Posted: Fall sign posted  Room Door Open: Yes  Alarm On: Bed, Chair  Patient Moved Closer to Nursing Station: No    Active Consults:   IP CONSULT TO SOCIAL WORK  IP CONSULT TO CASE MANAGEMENT  IP CONSULT TO PHARMACY  IP CONSULT TO DIABETES MANAGEMENT    Length of Stay:  Expected LOS: 6  Actual LOS: 6    Norma Kennedy RN                              
End of Shift Note    Bedside shift change report given to AMANDA Lozoya (oncoming nurse) by Dejah Soria LPN (offgoing nurse).  Report included the following information SBAR    Shift worked:  0700 - 1930     Shift summary and any significant changes:     Notified by night shift RN that pt's glucose this morning when labs were drawn was 54 - RN gave juice and rechecked, glucose came up to 79.     When hospitalist rounded he requested endocrine to see pt today due to hypoglycemia - message sent.     No acute events throughout shift. Pt assisted with meals as per tech. All meds given as per MAR. Pt resting comfortably upon shift completion, all concerns addressed.      Concerns for physician to address:  none     Zone phone for oncoming shift:          Activity:  Level of Assistance: Moderate assist, patient does 50-74%  Number times ambulated in hallways past shift: 0  Number of times OOB to chair past shift: 0    Cardiac:   Cardiac Monitoring: No      Cardiac Rhythm: Sinus rhythm    Access:  Current line(s): PIV    Genitourinary:   Urinary Status: Incontinent briefs    Respiratory:   O2 Device: None (Room air)  Chronic home O2 use?: NO  Incentive spirometer at bedside: N/A    GI:  Last BM (including prior to admit): 07/23/25  Current diet:  ADULT DIET; Easy to Chew  Passing flatus: YES    Pain Management:   Patient states pain is manageable on current regimen: YES    Skin:  Lev Scale Score: 18  Interventions: Wound Offloading (Prevention Methods): Bed, pressure reduction mattress, Pillows, Turning    Patient Safety:  Fall Risk: Nursing Judgement-Fall Risk High(Add Comments): Yes  Fall Risk Interventions  Nursing Judgement-Fall Risk High(Add Comments): Yes  Toilet Every 2 Hours-In Advance of Need: Yes  Hourly Visual Checks: Awake, In bed  Fall Visual Posted: Armband, Socks  Room Door Open: Yes  Alarm On: Bed  Patient Moved Closer to Nursing Station: No    Active Consults:   IP CONSULT TO SOCIAL WORK  IP 
End of Shift Note    Bedside shift change report given to AMANDA Moss (oncoming nurse) by Darell Mart RN (offgoing nurse).  Report included the following information SBAR, Intake/Output, MAR, and Quality Measures    Shift worked:  6543-9906     Shift summary and any significant changes:     Patient had an uneventful night , she took all her pills whole when crushed into applesauce. She got a CHG bath and got changed into a new gown.    Plan of patients care ongoing.      Concerns for physician to address:  Confusion      Zone phone for oncoming shift:   [phone]       Activity:  Level of Assistance: Moderate assist, patient does 50-74%  Number times ambulated in hallways past shift: 0  Number of times OOB to chair past shift: 0    Cardiac:   Cardiac Monitoring: No      Cardiac Rhythm: Sinus rhythm    Access:  Current line(s): PIV    Genitourinary:   Urinary Status: Voiding, External catheter    Respiratory:   O2 Device: None (Room air)  Chronic home O2 use?: NO  Incentive spirometer at bedside: NO    GI:     Current diet:  ADULT DIET; Easy to Chew  Passing flatus: YES    Pain Management:   Patient states pain is manageable on current regimen: N/A    Skin:  Lev Scale Score: 18  Interventions: Wound Offloading (Prevention Methods): Pillows, Repositioning, Turning    Patient Safety:  Fall Risk: Nursing Judgement-Fall Risk High(Add Comments): Yes  Fall Risk Interventions  Nursing Judgement-Fall Risk High(Add Comments): Yes  Toilet Every 2 Hours-In Advance of Need: Yes  Hourly Visual Checks: In bed  Fall Visual Posted: Armband, Socks  Room Door Open: Yes  Alarm On: Bed  Patient Moved Closer to Nursing Station: No    Active Consults:   IP CONSULT TO SOCIAL WORK  IP CONSULT TO CASE MANAGEMENT  IP CONSULT TO PHARMACY    Length of Stay:  Expected LOS: 4  Actual LOS: 3    Darell Mart RN      
End of Shift Note    Bedside shift change report given to AMANDA Moss (oncoming nurse) by Darell Mart RN (offgoing nurse).  Report included the following information SBAR, Intake/Output, and Quality Measures    Shift worked:  8274-1292     Shift summary and any significant changes:     Patient had an uneventful night , she took all her pills crushed in applause . Patient was cleaned with CHG wipes and got her gown and bed linens changed for her .     Plan of patient care ongoing.      Concerns for physician to address:  none     Zone phone for oncoming shift:   [phone]       Activity:  Level of Assistance: Moderate assist, patient does 50-74%  Number times ambulated in hallways past shift: 0  Number of times OOB to chair past shift: 0    Cardiac:   Cardiac Monitoring: No      Cardiac Rhythm: Sinus rhythm    Access:  Current line(s): PIV    Genitourinary:   Urinary Status: Voiding, External catheter    Respiratory:   O2 Device: None (Room air)  Chronic home O2 use?: NO  Incentive spirometer at bedside: NO    GI:     Current diet:  ADULT DIET; Easy to Chew  Passing flatus: YES    Pain Management:   Patient states pain is manageable on current regimen: N/A    Skin:  Lev Scale Score: 19  Interventions: Wound Offloading (Prevention Methods): Pillows, Repositioning, Turning    Patient Safety:  Fall Risk: Nursing Judgement-Fall Risk High(Add Comments): Yes  Fall Risk Interventions  Nursing Judgement-Fall Risk High(Add Comments): Yes  Toilet Every 2 Hours-In Advance of Need: Yes  Hourly Visual Checks: In bed  Fall Visual Posted: Armband, Socks  Room Door Open: Yes  Alarm On: Bed  Patient Moved Closer to Nursing Station: No    Active Consults:   IP CONSULT TO SOCIAL WORK  IP CONSULT TO CASE MANAGEMENT  IP CONSULT TO PHARMACY    Length of Stay:  Expected LOS: 4  Actual LOS: 2    Darell Mart RN      
End of Shift Note    Bedside shift change report given to AMANDA Pugh (oncoming nurse) by Rebecca Pittman LPN (offgoing nurse).  Report included the following information SBAR and MAR    Shift worked:  1936-0992     Shift summary and any significant changes:     Patient AM blood sugar was 351, MD notified. Patient meds given in applesauce. Patient received full linen change. Patient needs assistance with feeding. Meds given per MAR. Plan of care ongoing.      Concerns for physician to address:  None     Zone phone for oncoming shift:   [phone]       Rebecca Pittman LPN                           
End of Shift Note    Bedside shift change report given to AMANDA Pugh (oncoming nurse) by Riddhi Reid RN (offgoing nurse).  Report included the following information SBAR, Kardex, Intake/Output, MAR, and Recent Results    Shift worked:  5475-7523     Shift summary and any significant changes:     Blood sugar low in early AM, elevated thereafter, low again prior to dinner, pt ate all of dinner, poor PO intake for breakfast and lunch. Meds given crushed in applesauce. Discharge tomorrow in mid afternoon.      Concerns for physician to address:       Zone phone for oncoming shift:          Activity:  Level of Assistance: Moderate assist, patient does 50-74%  Number times ambulated in hallways past shift: 0  Number of times OOB to chair past shift: 0    Cardiac:   Cardiac Monitoring: No      Cardiac Rhythm: Sinus rhythm    Access:  Current line(s): PIV     Genitourinary:   Urinary Status: Incontinent    Respiratory:   O2 Device: None (Room air)  Chronic home O2 use?: NO  Incentive spirometer at bedside: N/A    GI:  Last BM (including prior to admit): 07/23/25  Current diet:  ADULT DIET; Easy to Chew  Passing flatus: YES    Pain Management:   Patient states pain is manageable on current regimen: N/A    Skin:  Lev Scale Score: 17  Interventions: Wound Offloading (Prevention Methods): Repositioning, Turning, Bed, pressure reduction mattress    Patient Safety:  Fall Risk: Nursing Judgement-Fall Risk High(Add Comments): Yes  Fall Risk Interventions  Nursing Judgement-Fall Risk High(Add Comments): Yes  Toilet Every 2 Hours-In Advance of Need: Yes  Hourly Visual Checks: Awake, In bed  Fall Visual Posted: Socks, Fall sign posted  Room Door Open: Yes  Alarm On: Bed, Chair  Patient Moved Closer to Nursing Station: No    Active Consults:   IP CONSULT TO SOCIAL WORK  IP CONSULT TO CASE MANAGEMENT  IP CONSULT TO PHARMACY  IP CONSULT TO DIABETES MANAGEMENT    Length of Stay:  Expected LOS: 10  Actual LOS: 9    Riddhi 
End of Shift Note    Bedside shift change report given to Chandler PATEL (oncoming nurse) by Rasheeda Lobo LPN (offgoing nurse).  Report included the following information SBAR and MAR    Shift worked:  NIGHT     Shift summary and any significant changes:     Pt was calm throughout night. Pt BG was 42 made MD and agnieszka RN aware. MD ordered D50 and pt BG is now 228. Meds given per MAR.       Concerns for physician to address:       Zone phone for oncoming shift:          Activity:  Level of Assistance: Moderate assist, patient does 50-74%  Number times ambulated in hallways past shift: 0  Number of times OOB to chair past shift: 0    Cardiac:   Cardiac Monitoring: No      Cardiac Rhythm: Sinus rhythm    Access:  Current line(s): PIV    Genitourinary:   Urinary Status: Voiding, Incontinent    Respiratory:   O2 Device: None (Room air)  Chronic home O2 use?: NO  Incentive spirometer at bedside: NO    GI:  Last BM (including prior to admit):  (pt does not remember)  Current diet:  ADULT DIET; Easy to Chew  Passing flatus: YES    Pain Management:   Patient states pain is manageable on current regimen: YES    Skin:  Lev Scale Score: 17  Interventions: Wound Offloading (Prevention Methods): Turning, Repositioning, Pillows    Patient Safety:  Fall Risk: Nursing Judgement-Fall Risk High(Add Comments): Yes  Fall Risk Interventions  Nursing Judgement-Fall Risk High(Add Comments): Yes  Toilet Every 2 Hours-In Advance of Need: Yes  Hourly Visual Checks: Awake, In bed, Confused  Fall Visual Posted: Fall sign posted, Socks, Armband  Room Door Open: Yes  Alarm On: Bed  Patient Moved Closer to Nursing Station: No    Active Consults:   IP CONSULT TO SOCIAL WORK  IP CONSULT TO CASE MANAGEMENT  IP CONSULT TO PHARMACY  IP CONSULT TO DIABETES MANAGEMENT    Length of Stay:  Expected LOS: 5  Actual LOS: 5    Rasheeda Lobo LPN     
End of Shift Note    Bedside shift change report given to DARIEN Fernandez (oncoming nurse) by Darell Mart RN (offgoing nurse).  Report included the following information SBAR, Intake/Output, MAR, and Quality Measures    Shift worked:  1257-6383     Shift summary and any significant changes:     Patient had an uneventful night, she took all her pills whole crushed in applause .     Plan  of patient care ongoing .        Concerns for physician to address:  none     Zone phone for oncoming shift:   [phone]       Activity:  Level of Assistance: Moderate assist, patient does 50-74%  Number times ambulated in hallways past shift: 0  Number of times OOB to chair past shift: 0    Cardiac:   Cardiac Monitoring: No      Cardiac Rhythm: Sinus rhythm    Access:  Current line(s): PIV    Genitourinary:   Urinary Status: Voiding, Incontinent briefs    Respiratory:   O2 Device: None (Room air)  Chronic home O2 use?: NO  Incentive spirometer at bedside: NO    GI:  Last BM (including prior to admit): 07/23/25  Current diet:  ADULT DIET; Easy to Chew  Passing flatus: YES    Pain Management:   Patient states pain is manageable on current regimen: YES    Skin:  Lev Scale Score: 15  Interventions: Wound Offloading (Prevention Methods): Pillows, Repositioning, Turning    Patient Safety:  Fall Risk: Nursing Judgement-Fall Risk High(Add Comments): Yes  Fall Risk Interventions  Nursing Judgement-Fall Risk High(Add Comments): Yes  Toilet Every 2 Hours-In Advance of Need: Yes  Hourly Visual Checks: Eyes closed, In bed  Fall Visual Posted: Fall sign posted  Room Door Open: Yes  Alarm On: Bed, Chair  Patient Moved Closer to Nursing Station: No    Active Consults:   IP CONSULT TO SOCIAL WORK  IP CONSULT TO CASE MANAGEMENT  IP CONSULT TO PHARMACY  IP CONSULT TO DIABETES MANAGEMENT    Length of Stay:  Expected LOS: 8  Actual LOS: 8    Darell Mart, RN      
End of Shift Note    Bedside shift change report given to DARIEN Pond (oncoming nurse) by Norma Kennedy RN (offgoing nurse).  Report included the following information SBAR, Kardex, Intake/Output, MAR, Recent Results, and Quality Measures    Shift worked:  1025-2797     Shift summary and any significant changes:     Patient had uneventful night.Calm, follows commands, on room air, denies any pain and not in distress.Plan of care ongoing.     Concerns for physician to address:       Zone phone for oncoming shift:          Activity:  Level of Assistance: Moderate assist, patient does 50-74%  Number times ambulated in hallways past shift: 0  Number of times OOB to chair past shift: 0    Cardiac:   Cardiac Monitoring: No      Cardiac Rhythm: Sinus rhythm    Access:  Current line(s): PIV     Genitourinary:   Urinary Status: Incontinent briefs    Respiratory:   O2 Device: None (Room air)  Chronic home O2 use?: NO  Incentive spirometer at bedside: NO    GI:  Last BM (including prior to admit): 07/23/25  Current diet:  ADULT DIET; Easy to Chew  Passing flatus: YES    Pain Management:   Patient states pain is manageable on current regimen: YES    Skin:  Lev Scale Score: 18  Interventions: Wound Offloading (Prevention Methods): Bed, pressure reduction mattress, Pillows, Turning    Patient Safety:  Fall Risk: Nursing Judgement-Fall Risk High(Add Comments): Yes  Fall Risk Interventions  Nursing Judgement-Fall Risk High(Add Comments): Yes  Toilet Every 2 Hours-In Advance of Need: Yes  Hourly Visual Checks: Eyes closed, In bed  Fall Visual Posted: Fall sign posted  Room Door Open: Yes  Alarm On: Bed, Chair  Patient Moved Closer to Nursing Station: No    Active Consults:   IP CONSULT TO SOCIAL WORK  IP CONSULT TO CASE MANAGEMENT  IP CONSULT TO PHARMACY  IP CONSULT TO DIABETES MANAGEMENT    Length of Stay:  Expected LOS: 7  Actual LOS: 7    Norma Kennedy RN                              
End of Shift Note    Bedside shift change report given to Dana (oncoming nurse) by Yeong AE Jenny, RN (offgoing nurse).  Report included the following information SBAR    Shift worked:  7a - 3p     Shift summary and any significant changes:     Uneventful shift. Ordered meds given per MAR.      Concerns for physician to address:  none     Zone phone for oncoming shift:          Activity:  Level of Assistance: Moderate assist, patient does 50-74%  Number times ambulated in hallways past shift: 0  Number of times OOB to chair past shift: 0    Cardiac:   Cardiac Monitoring: No      Cardiac Rhythm: Sinus rhythm    Access:  Current line(s): PIV     Genitourinary:   Urinary Status: Incontinent briefs, Incontinent    Respiratory:   O2 Device: None (Room air)  Chronic home O2 use?: N/A  Incentive spirometer at bedside: N/A    GI:  Last BM (including prior to admit): 07/23/25  Current diet:  ADULT DIET; Easy to Chew  Passing flatus: YES    Pain Management:   Patient states pain is manageable on current regimen: N/A    Skin:  Lev Scale Score: 18  Interventions: Wound Offloading (Prevention Methods): Bed, pressure reduction mattress, Repositioning, Pillows, Turning    Patient Safety:  Fall Risk: Nursing Judgement-Fall Risk High(Add Comments): Yes  Fall Risk Interventions  Nursing Judgement-Fall Risk High(Add Comments): Yes  Toilet Every 2 Hours-In Advance of Need: Yes  Hourly Visual Checks: Confused, Eyes closed, In bed  Fall Visual Posted: Fall sign posted, Socks, Armband  Room Door Open: Yes  Alarm On: Bed  Patient Moved Closer to Nursing Station: No    Active Consults:   IP CONSULT TO SOCIAL WORK  IP CONSULT TO CASE MANAGEMENT  IP CONSULT TO PHARMACY  IP CONSULT TO DIABETES MANAGEMENT    Length of Stay:  Expected LOS: 7  Actual LOS: 6    Yeong AE Jenny, RN                            
End of Shift Note    Bedside shift change report given to Darell (oncoming nurse) by Isa Hernandez RN (offgoing nurse).  Report included the following information SBAR, Kardex, Intake/Output, MAR, Recent Results, and Cardiac Rhythm      Shift worked:  7a-7p     Shift summary and any significant changes:     Good appetite; rested most of day     Concerns for physician to address:  none     Zone phone for oncoming shift:   [phone]       Activity:  Level of Assistance: Moderate assist, patient does 50-74%  Number times ambulated in hallways past shift: 0  Number of times OOB to chair past shift: 0    Cardiac:   Cardiac Monitoring: No      Cardiac Rhythm: Sinus rhythm    Access:  Current line(s): PIV    Genitourinary:   Urinary Status: Voiding, External catheter, Incontinent briefs    Respiratory:   O2 Device: None (Room air)  Chronic home O2 use?: NO  Incentive spirometer at bedside: NO    GI:     Current diet:  ADULT DIET; Easy to Chew  Passing flatus: YES    Pain Management:   Patient states pain is manageable on current regimen: YES    Skin:  Lev Scale Score: 18  Interventions: Wound Offloading (Prevention Methods): Pillows, Repositioning, Turning    Patient Safety:  Fall Risk: Nursing Judgement-Fall Risk High(Add Comments): Yes  Fall Risk Interventions  Nursing Judgement-Fall Risk High(Add Comments): Yes  Toilet Every 2 Hours-In Advance of Need: Yes  Hourly Visual Checks: In bed  Fall Visual Posted: Armband, Socks  Room Door Open: Yes  Alarm On: Bed  Patient Moved Closer to Nursing Station: No    Active Consults:   IP CONSULT TO SOCIAL WORK  IP CONSULT TO CASE MANAGEMENT  IP CONSULT TO PHARMACY    Length of Stay:  Expected LOS: 4  Actual LOS: 2    Isa Hernandez RN      
End of Shift Note    Bedside shift change report given to Darell (oncoming nurse) by Isa Hernandez RN (offgoing nurse).  Report included the following information SBAR, Kardex, Intake/Output, MAR, Recent Results, and Cardiac Rhythm      Shift worked:  7a-7p     Shift summary and any significant changes:     Worked with PT today; had a good appetite; son visited      Concerns for physician to address:  none     Zone phone for oncoming shift:   [phone]       Activity:  Level of Assistance: Moderate assist, patient does 50-74%  Number times ambulated in hallways past shift: 0  Number of times OOB to chair past shift: 0    Cardiac:   Cardiac Monitoring: No      Cardiac Rhythm: Sinus rhythm    Access:  Current line(s): PIV    Genitourinary:   Urinary Status: Voiding, External catheter, Incontinent    Respiratory:   O2 Device: None (Room air)  Chronic home O2 use?: NO  Incentive spirometer at bedside: NO    GI:     Current diet:  ADULT DIET; Easy to Chew  Passing flatus: YES    Pain Management:   Patient states pain is manageable on current regimen: YES    Skin:  Lev Scale Score: 18  Interventions: Wound Offloading (Prevention Methods): Pillows, Repositioning    Patient Safety:  Fall Risk: Nursing Judgement-Fall Risk High(Add Comments): Yes  Fall Risk Interventions  Nursing Judgement-Fall Risk High(Add Comments): Yes  Toilet Every 2 Hours-In Advance of Need: Yes  Hourly Visual Checks: In bed  Fall Visual Posted: Armband, Socks  Room Door Open: Yes  Alarm On: Bed  Patient Moved Closer to Nursing Station: No    Active Consults:   IP CONSULT TO SOCIAL WORK  IP CONSULT TO CASE MANAGEMENT  IP CONSULT TO PHARMACY    Length of Stay:  Expected LOS: 4  Actual LOS: 1    Isa Hernandez RN      
End of Shift Note    Bedside shift change report given to Darell (oncoming nurse) by Isa Hernandez RN (offgoing nurse).  Report included the following information SBAR, Kardex, Intake/Output, MAR, and Recent Results    Shift worked:  7a-7p     Shift summary and any significant changes:     BG ws very high up to 443, orders were changed, BG went very low to 42, meds held last BG 71 ate dinner, needs recheck; given miralax     Concerns for physician to address:  BGs, no BM     Zone phone for oncoming shift:   [phone]       Activity:  Level of Assistance: Moderate assist, patient does 50-74%  Number times ambulated in hallways past shift: 0  Number of times OOB to chair past shift: 0    Cardiac:   Cardiac Monitoring: No      Cardiac Rhythm: Sinus rhythm    Access:  Current line(s): PIV    Genitourinary:   Urinary Status: Voiding, External catheter, Incontinent briefs    Respiratory:   O2 Device: None (Room air)  Chronic home O2 use?: NO  Incentive spirometer at bedside: NO    GI:     Current diet:  ADULT DIET; Easy to Chew  Passing flatus: NO    Pain Management:   Patient states pain is manageable on current regimen: YES    Skin:  Lev Scale Score: 18  Interventions: Wound Offloading (Prevention Methods): Pillows, Repositioning, Turning    Patient Safety:  Fall Risk: Nursing Judgement-Fall Risk High(Add Comments): Yes  Fall Risk Interventions  Nursing Judgement-Fall Risk High(Add Comments): Yes  Toilet Every 2 Hours-In Advance of Need: Yes  Hourly Visual Checks: In bed  Fall Visual Posted: Armband, Socks  Room Door Open: Yes  Alarm On: Bed  Patient Moved Closer to Nursing Station: No    Active Consults:   IP CONSULT TO SOCIAL WORK  IP CONSULT TO CASE MANAGEMENT  IP CONSULT TO PHARMACY    Length of Stay:  Expected LOS: 3  Actual LOS: 3    Isa Hernandez, RN     
End of Shift Note    Bedside shift change report given to Isa PATEL (oncoming nurse) by Dalia Hanna RN (offgoing nurse).  Report included the following information SBAR, Intake/Output, MAR, Recent Results, and Alarm Parameters     Shift worked:  Night     Shift summary and any significant changes:    Received patient from ER, Disoriented, unable to answer questions correctly.  Able to follow commands.  Frequent rounds and CHG bathed done.  Plan of care ongoing.     Concerns for physician to address:      Zone phone for oncoming shift:          Activity:  Level of Assistance: Moderate assist, patient does 50-74%  Number times ambulated in hallways past shift: 0  Number of times OOB to chair past shift: 0    Cardiac:   Cardiac Monitoring: No      Cardiac Rhythm: Sinus rhythm    Access:  Current line(s): PIV     Genitourinary:   Urinary Status: Voiding, Incontinent, External catheter    Respiratory:   O2 Device: None (Room air)  Chronic home O2 use?: NO  Incentive spirometer at bedside: NO    GI:     Current diet:  ADULT DIET; Easy to Chew  Passing flatus: YES    Pain Management:   Patient states pain is manageable on current regimen: N/A    Skin:  Lev Scale Score: 18  Interventions: Wound Offloading (Prevention Methods): Pillows, Repositioning    Patient Safety:  Fall Risk: Nursing Judgement-Fall Risk High(Add Comments): Yes  Fall Risk Interventions  Nursing Judgement-Fall Risk High(Add Comments): Yes  Toilet Every 2 Hours-In Advance of Need: Yes  Hourly Visual Checks: In bed  Fall Visual Posted: Armband, Socks  Room Door Open: Yes  Alarm On: Bed  Patient Moved Closer to Nursing Station: No    Active Consults:   IP CONSULT TO SOCIAL WORK  IP CONSULT TO CASE MANAGEMENT  IP CONSULT TO PHARMACY    Length of Stay:  Expected LOS: 4  Actual LOS: 1    Dalia Hanna RN                            
End of Shift Note    Bedside shift change report given to Jerrica PATEL (oncoming nurse) by Keaton Deleon, RN (offgoing nurse).  Report included the following information SBAR, Kardex, MAR, Recent Results, and Quality Measures    Shift worked:  5985-0623     Shift summary and any significant changes:      Seen patient on bed, not in respiratory distress.Due medications given. Bath done. Hourly rounding done.Needs attended.  Plan of care ongoing.     Concerns for physician to address:  none     Zone phone for oncoming shift:          Activity:  Level of Assistance: Moderate assist, patient does 50-74%  Number times ambulated in hallways past shift: 0  Number of times OOB to chair past shift: 0    Cardiac:   Cardiac Monitoring: No      Cardiac Rhythm: Sinus rhythm    Access:  Current line(s): PIV     Genitourinary:   Urinary Status: Incontinent    Respiratory:   O2 Device: None (Room air)  Chronic home O2 use?: NO  Incentive spirometer at bedside: NO    GI:  Last BM (including prior to admit): 07/23/25  Current diet:  ADULT DIET; Easy to Chew  Passing flatus: YES    Pain Management:   Patient states pain is manageable on current regimen: YES    Skin:  Lev Scale Score: 17  Interventions: Wound Offloading (Prevention Methods): Repositioning, Pillows, Turning    Patient Safety:  Fall Risk: Nursing Judgement-Fall Risk High(Add Comments): Yes  Fall Risk Interventions  Nursing Judgement-Fall Risk High(Add Comments): Yes  Toilet Every 2 Hours-In Advance of Need: Yes  Hourly Visual Checks: Awake, In bed  Fall Visual Posted: Socks, Fall sign posted  Room Door Open: Yes  Alarm On: Bed, Chair  Patient Moved Closer to Nursing Station: No    Active Consults:   IP CONSULT TO SOCIAL WORK  IP CONSULT TO CASE MANAGEMENT  IP CONSULT TO PHARMACY  IP CONSULT TO DIABETES MANAGEMENT    Length of Stay:  Expected LOS: 10  Actual LOS: 10    Keaton Deleon, RN                            
End of Shift Note    Bedside shift change report given to Ranjit (oncoming nurse) by Yeong AE Jenny, RN (offgoing nurse).  Report included the following information SBAR    Shift worked:  7a - 3p     Shift summary and any significant changes:     Pt has been A&O x 1 only. All of scheduled meds given per MAR. 10 units of lantus given at lunch time per Dr. Iniguez's order.     Concerns for physician to address:  none     Zone phone for oncoming shift:          Activity:  Level of Assistance: Moderate assist, patient does 50-74%  Number times ambulated in hallways past shift: 0  Number of times OOB to chair past shift: 0    Cardiac:   Cardiac Monitoring: No      Cardiac Rhythm: Sinus rhythm    Access:  Current line(s): PIV     Genitourinary:   Urinary Status: Incontinent briefs, Incontinent    Respiratory:   O2 Device: None (Room air)  Chronic home O2 use?: N/A  Incentive spirometer at bedside: N/A    GI:  Last BM (including prior to admit):  (pt does not remember)  Current diet:  ADULT DIET; Easy to Chew  Passing flatus: YES    Pain Management:   Patient states pain is manageable on current regimen: N/A    Skin:  Lev Scale Score: 18  Interventions: Wound Offloading (Prevention Methods): Bed, pressure reduction mattress, Elevate heels, Pillows, Repositioning, Turning    Patient Safety:  Fall Risk: Nursing Judgement-Fall Risk High(Add Comments): Yes  Fall Risk Interventions  Nursing Judgement-Fall Risk High(Add Comments): Yes  Toilet Every 2 Hours-In Advance of Need: Yes  Hourly Visual Checks: Awake, In bed, Confused  Fall Visual Posted: Fall sign posted, Socks, Armband  Room Door Open: Yes  Alarm On: Bed  Patient Moved Closer to Nursing Station: No    Active Consults:   IP CONSULT TO SOCIAL WORK  IP CONSULT TO CASE MANAGEMENT  IP CONSULT TO PHARMACY  IP CONSULT TO DIABETES MANAGEMENT    Length of Stay:  Expected LOS: 5  Actual LOS: 4    Yeong AE Jenny, RN                            
End of Shift Note    Bedside shift change report given to Riddhi PATEL (oncoming nurse) by Keaton Deleon RN (offgoing nurse).  Report included the following information SBAR, Kardex, MAR, Recent Results, and Quality Measures    Shift worked:  [id number]     Shift summary and any significant changes:     Seen patient on bed, not in respiratory distress.Due medications given. Bath done. Hourly rounding done. Plan of care ongoing. POC glucose check 66mg/dl. Apple juice given. Repeat blood sugar checked still 66 mg/dl. Juice given, repat blood sugar is 149 mg/dl. Needs attended.     Concerns for physician to address:  none     Zone phone for oncoming shift:   [phone]       Activity:  Level of Assistance: Moderate assist, patient does 50-74%  Number times ambulated in hallways past shift: 0  Number of times OOB to chair past shift: 0    Cardiac:   Cardiac Monitoring: No      Cardiac Rhythm: Sinus rhythm    Access:  Current line(s): PIV     Genitourinary:   Urinary Status: Voiding, Incontinent    Respiratory:   O2 Device: None (Room air)  Chronic home O2 use?: NO  Incentive spirometer at bedside: NO    GI:  Last BM (including prior to admit): 07/23/25  Current diet:  ADULT DIET; Easy to Chew  Passing flatus: YES    Pain Management:   Patient states pain is manageable on current regimen: YES    Skin:  Lev Scale Score: 17  Interventions: Wound Offloading (Prevention Methods): Pillows, Repositioning, Turning    Patient Safety:  Fall Risk: Nursing Judgement-Fall Risk High(Add Comments): Yes  Fall Risk Interventions  Nursing Judgement-Fall Risk High(Add Comments): Yes  Toilet Every 2 Hours-In Advance of Need: Yes  Hourly Visual Checks: Eyes closed, In bed  Fall Visual Posted: Fall sign posted  Room Door Open: Yes  Alarm On: Bed, Chair  Patient Moved Closer to Nursing Station: No    Active Consults:   IP CONSULT TO SOCIAL WORK  IP CONSULT TO CASE MANAGEMENT  IP CONSULT TO PHARMACY  IP CONSULT TO DIABETES MANAGEMENT    Length 
Hospital follow-up PCP transitional care appointment has been scheduled with Dr. Isac Otoole on 8/12/25 at 1315. This is the first available appt due to limited provider availability. PCP office does not offer alternate provider option for hospital follow up. Pending patient discharge.     
Occupational Therapy Note  Orders received, chart reviewed, spoke to PT who evaluated pt.  PT reports that pt has advanced dementia, oriented x 0, and unable to carryover any therapeutic education/training.  Per PT, pt is dependent for all ADLs and family provides all self care needs.  No acute skilled acute OT needs identified at this time.  PT reports pt will likely return home with current support or transition to LTC.  Thank you.  Sheryl Bowen, OTR/L    
PHYSICAL THERAPY EVALUATION/DISCHARGE    Patient: Brooklynn Mathews (84 y.o. female)  Date: 7/19/2025  Primary Diagnosis: Multifocal pneumonia [J18.9]       Precautions:     falls         ASSESSMENT AND RECOMMENDATIONS:  Patient seen for PT evaluation following admission for PNA. Patient with h/o advanced dementia. Upon arrival patient in bed and noted to have visual and auditory hallucinations. Patient is pleasantly confused and demonstrates processing impairments. Patient is able to be redirected and follow commands for automatic functional tasks but is disoriented x 4. Patient demonstrating mildly impaired functional mobility and balance. She was able to complete bed mobility with SBA and required CGA/min A with HHA for short distance ambulation in room. Patient returned to bed at end of session with all needs met, bed rails up and alarm activated.    Patient demonstrates inability to learn and carryover therapeutic teaching therefore further therapy is not indicated.  Patient would benefit to be OOB to chair for all meals and ambulating in room with nursing staff to maintain current level of function.    Recommend patient return to home with family. She will require 24 hour assistance/supervision. If family is no longer to care for patient at home, she will need LTC           PLAN :  Recommendation for discharge: (in order for the patient to meet his/her long term goals):   No skilled physical therapy- home with family and 24 hour assist/supervision. If family is no longer able to provide 24 hour care, patient with need LTC    Other factors to consider for discharge: poor safety awareness and impaired cognition           SUBJECTIVE:   Patient stated “You can call me Olga.”    OBJECTIVE DATA SUMMARY:     Past Medical History:   Diagnosis Date    Diabetes (HCC)     DKA (diabetic ketoacidosis) ([facility]) 06/02/2017    Heart failure (HCC)     unknown to family    HLD (hyperlipidemia)     Hypertension     Hypothyroidism     
Patient has hypoglycemic episode at the start of shift.  .  Initial blood/glucose check was 42 and off going nurse contacted MD to get an order.  Juice given while waiting on order.  Recheck showed blood/glucose of 52 at 08:11.  D50 given at 0814 per order.  Recheck showed blood/glucose 228 at 0826.  
Spiritual Health History and Assessment/Progress Note  [facility]    Initial Encounter,  ,  ,      Name: Brooklynn Mathews MRN: [id number]    Age: 84 y.o.     Sex: female   Language: English   Sabianism: Jewish   Multifocal pneumonia     Date: 7/28/2025            Total Time Calculated: 6 min              Spiritual Assessment began in [facility] 3 MED TELE        Referral/Consult From: Rounding   Encounter Overview/Reason: Initial Encounter  Service Provided For: Patient not available    Smiley, Belief, Meaning:   Patient unable to assess at this time  Family/Friends No family/friends present      Importance and Influence:  Patient unable to assess at this time  Family/Friends No family/friends present    Community:  Patient feels well-supported. Support system includes: Children and Extended family  Family/Friends No family/friends present    Assessment and Plan of Care:     Patient Interventions include: Other: Pt. Not available  Family/Friends Interventions include: No family/friends present    Patient Plan of Care: Spiritual Care available upon further referral  Family/Friends Plan of Care: Spiritual Care available upon further referral    Electronically signed by Meche Yadav,  Intern on 7/28/2025 at 2:51 PM   
Spiritual Health History and Assessment/Progress Note  [facility]    Attempted Encounter,  ,  ,      Name: Brooklynn Mathews MRN: [id number]    Age: 84 y.o.     Sex: female   Language: English   Adventism: Mosque   Multifocal pneumonia     Date: 7/25/2025            Total Time Calculated: 4 min              Spiritual Assessment began in [facility] 3 MED TELE            Encounter Overview/Reason: Attempted Encounter  Service Provided For: Patient not available (Pt resting)    Smiley, Belief, Meaning:   Patient unable to assess at this time  Family/Friends No family/friends present      Importance and Influence:  Patient unable to assess at this time  Family/Friends No family/friends present    Community:  Patient Other: Unable to assess, pt resting  Family/Friends No family/friends present    Assessment and Plan of Care:     Patient Interventions include: Other: Unable to assess  Family/Friends Interventions include: No family/friends present    Patient Plan of Care: Spiritual Care available upon further referral  Family/Friends Plan of Care: No family/friends present    Electronically signed by Chaplain Kodak Intern on 7/25/2025 at 12:06 PM    
present, crackles present, no wheezing  CV:  Regular  rhythm,  No edema  GI:  Soft, Non distended, Non tender.  +Bowel sounds  Neurologic:  Alert and awake  Psych:   Pleasant  Skin:  No rashes.  No jaundice    Reviewed most current lab test results and cultures  YES  Reviewed most current radiology test results   YES  Review and summation of old records today    NO  Reviewed patient's current orders and MAR    YES  PMH/SH reviewed - no change compared to H&P    Procedures: see electronic medical records for all procedures/Xrays and details which were not copied into this note but were reviewed prior to creation of Plan.      LABS:  I reviewed today's most current labs and imaging studies.  Pertinent labs include:  Recent Labs     07/18/25  1521 07/19/25  0455   WBC 5.1 3.6   HGB 12.0 11.8   HCT 36.0 34.7*    217     Recent Labs     07/18/25  1521 07/19/25  0455    138   K 3.6 3.6    105   CO2 29 28   GLUCOSE 92 108*   BUN 16 11   CREATININE 0.66 0.57   CALCIUM 8.9 8.5   MG  --  2.0   PHOS  --  3.4   BILITOT 0.3 0.5   AST 26 22   ALT 23 17       Signed: Gerson Iniguez MD

## 2025-07-28 NOTE — PLAN OF CARE
Problem: Chronic Conditions and Co-morbidities  Goal: Patient's chronic conditions and co-morbidity symptoms are monitored and maintained or improved  7/28/2025 0807 by Jerrica Villar RN  Outcome: Progressing  7/27/2025 2233 by Keaton Deleon RN  Outcome: Progressing     Problem: Discharge Planning  Goal: Discharge to home or other facility with appropriate resources  7/28/2025 0807 by Jerrica Villar RN  Outcome: Progressing  7/27/2025 2233 by Keaton Deleon RN  Outcome: Progressing     Problem: Safety - Adult  Goal: Free from fall injury  7/28/2025 0807 by Jerrica Villar RN  Outcome: Progressing  7/27/2025 2233 by Keaton Deleon RN  Outcome: Progressing     Problem: Pain  Goal: Verbalizes/displays adequate comfort level or baseline comfort level  7/28/2025 0807 by Jerrica Villar RN  Outcome: Progressing  7/27/2025 2233 by Keaton Deleon RN  Outcome: Progressing     Problem: Neurosensory - Adult  Goal: Achieves stable or improved neurological status  7/28/2025 0807 by Jerrica Villar RN  Outcome: Progressing  7/27/2025 2233 by Keaton Deleon RN  Outcome: Progressing  Goal: Achieves maximal functionality and self care  7/28/2025 0807 by Jerrica Villar RN  Outcome: Progressing  7/27/2025 2233 by Keaton Deleon RN  Outcome: Progressing     Problem: Genitourinary - Adult  Goal: Absence of urinary retention  7/28/2025 0807 by Jerrica Villar RN  Outcome: Progressing  7/27/2025 2233 by Keaton Deleon RN  Outcome: Progressing     Problem: Metabolic/Fluid and Electrolytes - Adult  Goal: Electrolytes maintained within normal limits  7/28/2025 0807 by Jerrica Villar RN  Outcome: Progressing  7/27/2025 2233 by Keaton Deleon RN  Outcome: Progressing  Goal: Hemodynamic stability and optimal renal function maintained  7/28/2025 0807 by Jerrica Villar RN  Outcome: Progressing  7/27/2025 2233 by Keaton Deleon RN  Outcome: Progressing  Goal: Glucose maintained within prescribed range  7/28/2025

## 2025-07-31 NOTE — PROGRESS NOTES
Unable to assess at this time, spoke with daughter who states medications are being stopped and she can not remember all her meds at this time.

## 2025-07-31 NOTE — ED PROVIDER NOTES
[facility] EMERGENCY DEPARTMENT  EMERGENCY DEPARTMENT ENCOUNTER       Pt Name: Brooklynn Mathews  MRN: [id number]  Birthdate 1940  Date of evaluation: 7/31/2025  Provider: Zeke Mello MD   PCP: Isac Otoole MD  Note Started: 3:33 PM 7/31/25     CHIEF COMPLAINT       Chief Complaint   Patient presents with    Altered Mental Status     BIBEMS from home for AMS. EMS reports pt was hypotensive in route at 77/130. Pt is responsive to pain only.         HISTORY OF PRESENT ILLNESS: 1 or more elements      History From: EMS and Patient's Daughter  Altered Mental Status     Brooklynn Mathews is a 84 y.o. female who presents to the [place] department with altered mental status.  Patient was discovered by her family members confused this morning.  She is not able to contribute to history.  Patient was profoundly hypotensive on arrival, initial BP is 65/19.  Patient's family reports she was recently admitted to the hospital for pneumonia and hypoglycemia, they state her blood sugars have been running both high and low recently.     Nursing Notes were all reviewed and agreed with or any disagreements were addressed in the HPI.     REVIEW OF SYSTEMS      Review of Systems     Positives and Pertinent negatives as per HPI.    PAST HISTORY     Past Medical History:  Past Medical History:   Diagnosis Date    Diabetes (HCC)     DKA (diabetic ketoacidosis) (HCC) 06/02/2017    Heart failure (HCC)     unknown to family    HLD (hyperlipidemia)     Hypertension     Hypothyroidism     Stroke (HCC)          Past Surgical History:  Past Surgical History:   Procedure Laterality Date    GYN      HEENT      thyroidectomy    HYSTERECTOMY (CERVIX STATUS UNKNOWN)      REMOVAL GALLBLADDER      THYROIDECTOMY         Family History:  Family History   Problem Relation Age of Onset    Diabetes Father     No Known Problems Mother        Social History:  Social History     Tobacco Use    Smoking status: Never     Passive exposure: Never

## 2025-07-31 NOTE — PROGRESS NOTES
Spiritual Health History and Assessment/Progress Note  [facility]    Grief, Loss, and Adjustments,  , Grief and loss, Death,      Name: Brooklynn Mathews MRN: [id number]    Age: 84 y.o.     Sex: female   Language: English   Confucianist: Hoahaoism   <principal problem not specified>     Date: 2025            Total Time Calculated: 18 min              Spiritual Assessment began in [facility] EMERGENCY DEPARTMENT        Referral/Consult From: Nurse   Encounter Overview/Reason: Grief, Loss, and Adjustments  Service Provided For: Family    Smiley, Belief, Meaning:   Patient unable to assess at this time  Family/Friends identify as spiritual, are connected with a smiley tradition or spiritual practice, and have beliefs or practices that help with coping during difficult times      Importance and Influence:  Patient unable to assess at this time  Family/Friends have no beliefs influential to healthcare decision-making identified during this visit    Community:  Patient Other: unable to assess at this time  Family/Friends feel well-supported. Support system includes: Children and Extended family    Assessment and Plan of Care:     Patient Interventions include: Other:  notified of patient's death   Family/Friends Interventions include: Facilitated expression of thoughts and feelings, Explored spiritual coping/struggle/distress, Affirmed coping skills/support systems, and Other: shared prayer    Patient Plan of Care: No spiritual needs identified for follow-up  Family/Friends Plan of Care: No spiritual needs identified for follow-up    Family will call Nursing Supervisor when they decide on  home to handle final arrangements. Provided them with telephone number of Nursing Supervisor    Electronically signed by Chaplain MEGHAN PAEZ on 2025 at 3:41 PM

## 2025-07-31 NOTE — CONSULTS
Palliative Medicine    Discussed with bedside nurse.  Patient is imminently dying, with HR in low 30s, unresponsive with comfort meds in place.   Recommend calling  to be with family.   Goals clear and palliative measures in place, will hold off on palliative consult at this time.

## 2025-08-01 LAB — B-OH-BUTYR SERPL-SCNC: >4.42 MMOL/L
